# Patient Record
Sex: MALE | Race: WHITE | NOT HISPANIC OR LATINO | Employment: OTHER | URBAN - METROPOLITAN AREA
[De-identification: names, ages, dates, MRNs, and addresses within clinical notes are randomized per-mention and may not be internally consistent; named-entity substitution may affect disease eponyms.]

---

## 2018-04-27 ENCOUNTER — OFFICE VISIT (OUTPATIENT)
Dept: URGENT CARE | Facility: CLINIC | Age: 73
End: 2018-04-27
Payer: MEDICARE

## 2018-04-27 ENCOUNTER — APPOINTMENT (OUTPATIENT)
Dept: RADIOLOGY | Facility: IMAGING CENTER | Age: 73
End: 2018-04-27
Attending: NURSE PRACTITIONER
Payer: MEDICARE

## 2018-04-27 VITALS
WEIGHT: 173 LBS | DIASTOLIC BLOOD PRESSURE: 74 MMHG | BODY MASS INDEX: 27.8 KG/M2 | HEART RATE: 102 BPM | TEMPERATURE: 98.2 F | SYSTOLIC BLOOD PRESSURE: 128 MMHG | OXYGEN SATURATION: 98 % | RESPIRATION RATE: 12 BRPM | HEIGHT: 66 IN

## 2018-04-27 DIAGNOSIS — M25.512 LEFT SHOULDER PAIN, UNSPECIFIED CHRONICITY: ICD-10-CM

## 2018-04-27 DIAGNOSIS — M25.562 ACUTE PAIN OF LEFT KNEE: ICD-10-CM

## 2018-04-27 PROCEDURE — 99203 OFFICE O/P NEW LOW 30 MIN: CPT | Performed by: NURSE PRACTITIONER

## 2018-04-27 PROCEDURE — 73030 X-RAY EXAM OF SHOULDER: CPT | Mod: TC,FY,LT | Performed by: NURSE PRACTITIONER

## 2018-04-27 PROCEDURE — 73562 X-RAY EXAM OF KNEE 3: CPT | Mod: TC,FY,LT | Performed by: NURSE PRACTITIONER

## 2018-04-27 RX ORDER — PANTOPRAZOLE SODIUM 40 MG/1
40 TABLET, DELAYED RELEASE ORAL
Status: ON HOLD | COMMUNITY
End: 2019-11-24 | Stop reason: SDUPTHER

## 2018-04-27 RX ORDER — LIDOCAINE 50 MG/G
PATCH TOPICAL
Qty: 30 PATCH | Refills: 0 | Status: SHIPPED | OUTPATIENT
Start: 2018-04-27 | End: 2018-06-07

## 2018-04-27 RX ORDER — SODIUM BISULFITE 100 %
POWDER (GRAM) MISCELLANEOUS
COMMUNITY
End: 2018-06-07

## 2018-04-27 RX ORDER — HYDROXYZINE 50 MG/1
50 TABLET, FILM COATED ORAL EVERY 6 HOURS PRN
COMMUNITY
End: 2019-11-11

## 2018-04-27 RX ORDER — INSULIN GLARGINE 100 [IU]/ML
36 INJECTION, SOLUTION SUBCUTANEOUS EVERY EVENING
Status: ON HOLD | COMMUNITY
Start: 2018-02-26 | End: 2019-11-18

## 2018-04-27 ASSESSMENT — ENCOUNTER SYMPTOMS
FEVER: 0
CHILLS: 0

## 2018-04-27 ASSESSMENT — PAIN SCALES - GENERAL: PAINLEVEL: 5=MODERATE PAIN

## 2018-04-27 NOTE — PROGRESS NOTES
"Subjective:      Luis Sepulveda is a 72 y.o. male who presents with Knee Pain (started three weeks ago) and Shoulder Pain    Past Medical History:   Diagnosis Date   • CAD (coronary artery disease)     stents   • Diabetes    • Hypertension    • Peripheral neuropathy (CMS-HCC)      Social History     Social History   • Marital status:      Spouse name: N/A   • Number of children: N/A   • Years of education: N/A     Occupational History   • Not on file.     Social History Main Topics   • Smoking status: Former Smoker     Years: 55.00     Quit date: 1/23/2014   • Smokeless tobacco: Never Used   • Alcohol use No   • Drug use: No   • Sexual activity: Not on file     Other Topics Concern   • Not on file     Social History Narrative   • No narrative on file     History reviewed. No pertinent family history.    Allergies : Patient has no known allergies.    Patient is a 72-year-old male who presents to complaint of pain to the left knee and left shoulder. He states symptoms started about 3 weeks ago. Prior to that he does not recollect having problematic pain. He denies any known injury.          Knee Pain   This is a new problem. The current episode started 1 to 4 weeks ago. The problem occurs constantly. The problem has been unchanged. Pertinent negatives include no chills or fever. Associated symptoms comments: Shoulder pain. Nothing aggravates the symptoms. He has tried nothing for the symptoms. The treatment provided no relief.       Review of Systems   Constitutional: Negative for chills, fever and malaise/fatigue.   Musculoskeletal:        Knee and shoulder pain   Skin: Negative.    All other systems reviewed and are negative.         Objective:     /74   Pulse (!) 102   Temp 36.8 °C (98.2 °F)   Resp 12   Ht 1.676 m (5' 6\")   Wt 78.5 kg (173 lb)   SpO2 98%   BMI 27.92 kg/m²      Physical Exam   Constitutional: He is oriented to person, place, and time. He appears well-developed and " well-nourished.   Musculoskeletal:        Left shoulder: He exhibits decreased range of motion and pain.   Point tenderness to the anterior left shoulder. Full range of motion with abduction anteriorly and posteriorly. Unable to abduct greater than 90° laterally.  5/5 and equal, push/pull 5/5 and equal, this does not replicate pain.    There appears to be slight hypertrophy of the left knee medially, there is point tenderness to the medial posterior aspect of the knee and over the medial aspect of the knee over the joint line. Patient is able to extend to 170°. Pain is reproduced with weightbearing and extension. No obvious laxity in the knee joint.   Neurological: He is alert and oriented to person, place, and time.   Skin: Skin is warm and dry. Capillary refill takes less than 2 seconds.   Psychiatric: He has a normal mood and affect. His behavior is normal. Judgment and thought content normal.   Vitals reviewed.    XR knee:    /27/2018 4:26 PM    HISTORY/REASON FOR EXAM:  Atraumatic Pain/Swelling/Deformity  Left knee pain.    TECHNIQUE/EXAM DESCRIPTION AND NUMBER OF VIEWS:  3 views of the LEFT knee.    COMPARISON: None    FINDINGS:  No acute fracture or dislocation.    Moderate tricompartmental osteoarthritis, most in the medial compartment.    Moderate knee joint effusion.    Vascular calcification.   Impression         1. Moderate knee joint effusion. No acute fracture.         Xr shoulder:     4/27/2018 4:26 PM    HISTORY/REASON FOR EXAM:  Atraumatic Pain/Swelling/Deformity  Left shoulder pain x 3 weeks, no known injury    TECHNIQUE/EXAM DESCRIPTION AND NUMBER OF VIEWS:  3 views of the LEFT shoulder.    COMPARISON: None    FINDINGS:    No acute fracture or dislocation.  Moderate osteoarthritis of the glenohumeral joint and AC joint.  Redemonstration of subacromial enthesophyte.   Impression         Moderate osteoarthritis of the glenohumeral joint and AC joint.  Redemonstration of subacromial enthesophyte                  Assessment/Plan:     1. Acute pain of left knee  -ice PRN  -tylenol PRN  -rest  -follow up with PCP for persistent symptoms  -consider referral to orthopedics for worsening of symptoms  -Lidoderm patches PRN    2. Acute pain of the left shoulder     -ice PRN  -tylenol PRN  -rest  -follow up with PCP for persistent symptoms  -consider referral to orthopedics for worsening of symptoms  -Lidoderm patches PRN

## 2018-06-07 ENCOUNTER — HOSPITAL ENCOUNTER (EMERGENCY)
Facility: MEDICAL CENTER | Age: 73
End: 2018-06-07
Attending: EMERGENCY MEDICINE
Payer: MEDICARE

## 2018-06-07 VITALS
TEMPERATURE: 98.8 F | HEIGHT: 66 IN | HEART RATE: 90 BPM | BODY MASS INDEX: 27.35 KG/M2 | WEIGHT: 170.19 LBS | RESPIRATION RATE: 18 BRPM | OXYGEN SATURATION: 95 % | DIASTOLIC BLOOD PRESSURE: 78 MMHG | SYSTOLIC BLOOD PRESSURE: 144 MMHG

## 2018-06-07 DIAGNOSIS — M19.90 INFLAMMATORY ARTHRITIS: ICD-10-CM

## 2018-06-07 LAB
ALBUMIN SERPL BCP-MCNC: 3.8 G/DL (ref 3.2–4.9)
ALBUMIN/GLOB SERPL: 1.1 G/DL
ALP SERPL-CCNC: 120 U/L (ref 30–99)
ALT SERPL-CCNC: 14 U/L (ref 2–50)
ANION GAP SERPL CALC-SCNC: 9 MMOL/L (ref 0–11.9)
AST SERPL-CCNC: 12 U/L (ref 12–45)
BASOPHILS # BLD AUTO: 0.5 % (ref 0–1.8)
BASOPHILS # BLD: 0.04 K/UL (ref 0–0.12)
BILIRUB SERPL-MCNC: 0.6 MG/DL (ref 0.1–1.5)
BNP SERPL-MCNC: 84 PG/ML (ref 0–100)
BUN SERPL-MCNC: 68 MG/DL (ref 8–22)
CALCIUM SERPL-MCNC: 8.7 MG/DL (ref 8.4–10.2)
CHLORIDE SERPL-SCNC: 107 MMOL/L (ref 96–112)
CK SERPL-CCNC: 32 U/L (ref 0–154)
CO2 SERPL-SCNC: 20 MMOL/L (ref 20–33)
CREAT SERPL-MCNC: 3.9 MG/DL (ref 0.5–1.4)
CRP SERPL HS-MCNC: 3.37 MG/DL (ref 0–0.75)
EOSINOPHIL # BLD AUTO: 0.01 K/UL (ref 0–0.51)
EOSINOPHIL NFR BLD: 0.1 % (ref 0–6.9)
ERYTHROCYTE [DISTWIDTH] IN BLOOD BY AUTOMATED COUNT: 43.3 FL (ref 35.9–50)
ERYTHROCYTE [SEDIMENTATION RATE] IN BLOOD BY WESTERGREN METHOD: 62 MM/HOUR (ref 0–20)
GLOBULIN SER CALC-MCNC: 3.5 G/DL (ref 1.9–3.5)
GLUCOSE SERPL-MCNC: 120 MG/DL (ref 65–99)
HCT VFR BLD AUTO: 26.9 % (ref 42–52)
HGB BLD-MCNC: 9.5 G/DL (ref 14–18)
IMM GRANULOCYTES # BLD AUTO: 0.05 K/UL (ref 0–0.11)
IMM GRANULOCYTES NFR BLD AUTO: 0.6 % (ref 0–0.9)
LYMPHOCYTES # BLD AUTO: 0.88 K/UL (ref 1–4.8)
LYMPHOCYTES NFR BLD: 10.4 % (ref 22–41)
MCH RBC QN AUTO: 29.1 PG (ref 27–33)
MCHC RBC AUTO-ENTMCNC: 35.3 G/DL (ref 33.7–35.3)
MCV RBC AUTO: 82.5 FL (ref 81.4–97.8)
MONOCYTES # BLD AUTO: 0.79 K/UL (ref 0–0.85)
MONOCYTES NFR BLD AUTO: 9.4 % (ref 0–13.4)
NEUTROPHILS # BLD AUTO: 6.66 K/UL (ref 1.82–7.42)
NEUTROPHILS NFR BLD: 79 % (ref 44–72)
NRBC # BLD AUTO: 0 K/UL
NRBC BLD-RTO: 0 /100 WBC
PLATELET # BLD AUTO: 267 K/UL (ref 164–446)
PMV BLD AUTO: 8.6 FL (ref 9–12.9)
POTASSIUM SERPL-SCNC: 3.5 MMOL/L (ref 3.6–5.5)
PROT SERPL-MCNC: 7.3 G/DL (ref 6–8.2)
RBC # BLD AUTO: 3.26 M/UL (ref 4.7–6.1)
SODIUM SERPL-SCNC: 136 MMOL/L (ref 135–145)
TROPONIN I SERPL-MCNC: <0.02 NG/ML (ref 0–0.04)
WBC # BLD AUTO: 8.4 K/UL (ref 4.8–10.8)

## 2018-06-07 PROCEDURE — 84484 ASSAY OF TROPONIN QUANT: CPT

## 2018-06-07 PROCEDURE — 99284 EMERGENCY DEPT VISIT MOD MDM: CPT

## 2018-06-07 PROCEDURE — 36415 COLL VENOUS BLD VENIPUNCTURE: CPT

## 2018-06-07 PROCEDURE — 80053 COMPREHEN METABOLIC PANEL: CPT

## 2018-06-07 PROCEDURE — 86140 C-REACTIVE PROTEIN: CPT

## 2018-06-07 PROCEDURE — 82550 ASSAY OF CK (CPK): CPT

## 2018-06-07 PROCEDURE — 85025 COMPLETE CBC W/AUTO DIFF WBC: CPT

## 2018-06-07 PROCEDURE — 85652 RBC SED RATE AUTOMATED: CPT

## 2018-06-07 PROCEDURE — 83880 ASSAY OF NATRIURETIC PEPTIDE: CPT

## 2018-06-07 RX ORDER — SODIUM BICARBONATE 650 MG/1
650 TABLET ORAL 2 TIMES DAILY
Status: ON HOLD | COMMUNITY
End: 2019-11-18

## 2018-06-07 RX ORDER — HYDROCODONE BITARTRATE AND ACETAMINOPHEN 5; 325 MG/1; MG/1
1 TABLET ORAL EVERY 6 HOURS PRN
Qty: 16 TAB | Refills: 0 | Status: SHIPPED | OUTPATIENT
Start: 2018-06-07 | End: 2018-06-12

## 2018-06-07 RX ORDER — CARVEDILOL 6.25 MG/1
6.25 TABLET ORAL 2 TIMES DAILY WITH MEALS
Status: ON HOLD | COMMUNITY
End: 2019-11-18

## 2018-06-07 ASSESSMENT — PAIN SCALES - GENERAL
PAINLEVEL_OUTOF10: 3
PAINLEVEL_OUTOF10: 5

## 2018-06-07 NOTE — ED PROVIDER NOTES
ED Provider Note      ER PROVIDER NOTE        CHIEF COMPLAINT  Chief Complaint   Patient presents with   • Weakness     for the last 4-5 days limiting ROM   • Body Aches     muscle and body aches for the past 2 weeks, pt went to  2 weeks ago, he was told that Artritis might be the cause, pt came to the ED since he is not feeling better.        HPI  Luis Sepulveda is a 72 y.o. male who presents to the emergency department complaining of joint aches.  Patient reports that over the last 2 weeks he has had intermittent joint aches.  Initially his left knee, went to urgent care and was told he had arthritis.  Since that point he has had multiple other joints, both wrists, both knees, shoulders that have been aching as well.  States does not feel like muscle pain more of joint pain.  Seems to come and go without real cause.  He has had no fevers or chills.  He has had no chest pain, shortness of breath, nausea vomiting or diarrhea.  No recent travel.  No rashes.  Does report some generalized weakness but no focal weakness numbness or tingling    REVIEW OF SYSTEMS  Pertinent positives include joint aches. Pertinent negatives include no fever. See HPI for details. All other systems reviewed and are negative.    PAST MEDICAL HISTORY   has a past medical history of CAD (coronary artery disease); Diabetes; Hypertension; and Peripheral neuropathy (HCC).    SURGICAL HISTORY   has a past surgical history that includes other cardiac surgery; appendectomy; and other orthopedic surgery.    FAMILY HISTORY  History reviewed. No pertinent family history.    SOCIAL HISTORY  Social History     Social History   • Marital status:      Spouse name: N/A   • Number of children: N/A   • Years of education: N/A     Social History Main Topics   • Smoking status: Former Smoker     Years: 55.00     Quit date: 1/23/2014   • Smokeless tobacco: Never Used   • Alcohol use No   • Drug use: No   • Sexual activity: Not on file     Other  "Topics Concern   • Not on file     Social History Narrative   • No narrative on file      History   Drug Use No       CURRENT MEDICATIONS  Home Medications     Reviewed by Sandra Mathew (Pharmacy Tech) on 06/07/18 at 1507  Med List Status: Complete   Medication Last Dose Status   aspirin (ASA) 81 MG CHEW 6/6/2018 Active   carvedilol (COREG) 6.25 MG Tab 6/7/2018 Active   furosemide (LASIX) 40 MG TABS 6/7/2018 Active   hydrOXYzine HCl (ATARAX) 50 MG Tab 2 DAYS Active   LANTUS SOLOSTAR 100 UNIT/ML Solution Pen-injector injection 6/6/2018 Active   linagliptin (TRADJENTA) 5 MG Tab tablet 6/7/2018 Active   pantoprazole (PROTONIX) 40 MG Tablet Delayed Response 6/6/2018 Active   prasugrel (EFFIENT) 10 MG TABS 6/6/2018 Active   sodium bicarbonate (SODIUM BICARBONATE) 650 MG Tab 6/6/2018 Active                ALLERGIES  No Known Allergies    PHYSICAL EXAM  VITAL SIGNS: /83   Pulse 95   Temp 37.1 °C (98.8 °F)   Resp 18   Ht 1.676 m (5' 6\")   Wt 77.2 kg (170 lb 3.1 oz)   SpO2 99%   BMI 27.47 kg/m²   Pulse ox interpretation: I interpret this pulse ox as normal.    Constitutional: Alert in no apparent distress.  HENT: No signs of trauma, Bilateral external ears normal, Nose normal.   Eyes: Pupils are equal and reactive, Conjunctiva normal, Non-icteric.   Neck: Normal range of motion, No tenderness, Supple, No stridor.   Lymphatic: No lymphadenopathy noted.   Cardiovascular: Regular rate and rhythm, no murmurs.   Thorax & Lungs: Normal breath sounds, No respiratory distress, No wheezing, No chest tenderness.   Abdomen: Bowel sounds normal, Soft, No tenderness, No masses, No pulsatile masses. No peritoneal signs.  Skin: Warm, Dry, No erythema, No rash.   Back: No bony tenderness, No CVA tenderness.   Extremities: No muscular tenderness, mild tenderness over bilateral knees, bilateral wrists and bilateral shoulders, no redness or swelling intact distal pulses, No edema, No other tenderness, No cyanosis, " Negative Tatum's sign.  Musculoskeletal: Good range of motion in all major joints. No other tenderness to palpation or major deformities noted.   Neurologic: Alert , strength is 5 out of 5 with bilateral upper extremities, bicep, tricep, , hip flexion and extension, knee flexion and extension normal motor function, Normal sensory function, No focal deficits noted.   Psychiatric: Affect normal, Judgment normal, Mood normal.     DIAGNOSTIC STUDIES / PROCEDURES      LABS  Results for orders placed or performed during the hospital encounter of 06/07/18   CBC with Differential   Result Value Ref Range    WBC 8.4 4.8 - 10.8 K/uL    RBC 3.26 (L) 4.70 - 6.10 M/uL    Hemoglobin 9.5 (L) 14.0 - 18.0 g/dL    Hematocrit 26.9 (L) 42.0 - 52.0 %    MCV 82.5 81.4 - 97.8 fL    MCH 29.1 27.0 - 33.0 pg    MCHC 35.3 33.7 - 35.3 g/dL    RDW 43.3 35.9 - 50.0 fL    Platelet Count 267 164 - 446 K/uL    MPV 8.6 (L) 9.0 - 12.9 fL    Neutrophils-Polys 79.00 (H) 44.00 - 72.00 %    Lymphocytes 10.40 (L) 22.00 - 41.00 %    Monocytes 9.40 0.00 - 13.40 %    Eosinophils 0.10 0.00 - 6.90 %    Basophils 0.50 0.00 - 1.80 %    Immature Granulocytes 0.60 0.00 - 0.90 %    Nucleated RBC 0.00 /100 WBC    Neutrophils (Absolute) 6.66 1.82 - 7.42 K/uL    Lymphs (Absolute) 0.88 (L) 1.00 - 4.80 K/uL    Monos (Absolute) 0.79 0.00 - 0.85 K/uL    Eos (Absolute) 0.01 0.00 - 0.51 K/uL    Baso (Absolute) 0.04 0.00 - 0.12 K/uL    Immature Granulocytes (abs) 0.05 0.00 - 0.11 K/uL    NRBC (Absolute) 0.00 K/uL   Complete Metabolic Panel (CMP)   Result Value Ref Range    Sodium 136 135 - 145 mmol/L    Potassium 3.5 (L) 3.6 - 5.5 mmol/L    Chloride 107 96 - 112 mmol/L    Co2 20 20 - 33 mmol/L    Anion Gap 9.0 0.0 - 11.9    Glucose 120 (H) 65 - 99 mg/dL    Bun 68 (H) 8 - 22 mg/dL    Creatinine 3.90 (H) 0.50 - 1.40 mg/dL    Calcium 8.7 8.4 - 10.2 mg/dL    AST(SGOT) 12 12 - 45 U/L    ALT(SGPT) 14 2 - 50 U/L    Alkaline Phosphatase 120 (H) 30 - 99 U/L    Total Bilirubin 0.6  0.1 - 1.5 mg/dL    Albumin 3.8 3.2 - 4.9 g/dL    Total Protein 7.3 6.0 - 8.2 g/dL    Globulin 3.5 1.9 - 3.5 g/dL    A-G Ratio 1.1 g/dL   Troponin   Result Value Ref Range    Troponin I <0.02 0.00 - 0.04 ng/mL   Btype Natriuretic Peptide   Result Value Ref Range    B Natriuretic Peptide 84 0 - 100 pg/mL   CREATINE KINASE   Result Value Ref Range    CPK Total 32 0 - 154 U/L   CRP QUANTITIVE (NON-CARDIAC)   Result Value Ref Range    Stat C-Reactive Protein 3.37 (H) 0.00 - 0.75 mg/dL   ESTIMATED GFR   Result Value Ref Range    GFR If  18 (A) >60 mL/min/1.73 m 2    GFR If Non African American 15 (A) >60 mL/min/1.73 m 2   WESTERGREN SED RATE   Result Value Ref Range    Sed Rate Westergren 62 (H) 0 - 20 mm/hour       All labs reviewed by me.    RADIOLOGY  No orders to display     The radiologist's interpretation of all radiological studies have been reviewed by me.    COURSE & MEDICAL DECISION MAKING  Nursing notes, VS, PMSFHx reviewed in chart.    2:45 PM Patient seen and examined at bedside. . Ordered for labs to evaluate his symptoms.     Review of patient records show chronic kidney disease and chronic anemia    5:04 PM  Patient reevaluated, updated on results, patient is comfortable with plan    Decision Making:  This is a 72 y.o. male presenting with pain in his joints.  The nature of his symptoms is suggestive of an inflammatory arthritis.  He has no obvious muscle weakness or real muscle pain suggestive of a myositis at this time.  And no focal findings on exam to suggest a septic arthritis is overall well-appearing, afebrile with appropriate vital signs.  He does have chronic kidney disease and numbers appear around baseline for him.  Given this nonsteroidals would not be a good choice in this patient, will provide Norco for pain, call  to arrange primary care follow-up for further care    I reviewed prescription monitoring program for patient's narcotic use before prescribing a scheduled  drug.The patient will not drink alcohol nor drive with prescribed medications. The patient will return for new or worsening symptoms and is stable at the time of discharge.    The patient is referred to a primary physician for blood pressure management, diabetic screening, and for all other preventative health concerns.    In prescribing controlled substances to this patient, I certify that I have obtained and reviewed the medical history of Luis Sepulveda. I have also made a good nate effort to obtain applicable records from other providers who have treated the patient and records did not demonstrate any increased risk of substance abuse that would prevent me from prescribing controlled substances.     I have conducted a physical exam and documented it. I have reviewed Mr. Sepulveda’s prescription history as maintained by the [place] Prescription Monitoring Program.     I have assessed the patient’s risk for abuse, dependency, and addiction using the validated Opioid Risk Tool available at https://www.mdcalc.com/scmhxo-zaqk-rxvd-ort-narcotic-abuse.     Given the above, I believe the benefits of controlled substance therapy outweigh the risks. The reasons for prescribing controlled substances include non-narcotic, oral analgesic alternatives are contraindicated. Accordingly, I have discussed the risk and benefits, treatment plan, and alternative therapies with the patient.         DISPOSITION:  Patient will be discharged home in stable condition.    FOLLOW UP:        Our  will call you to help arrange primary care follow-up in the next week      OUTPATIENT MEDICATIONS:  New Prescriptions    HYDROCODONE-ACETAMINOPHEN (NORCO) 5-325 MG TAB PER TABLET    Take 1 Tab by mouth every 6 hours as needed (pain) for up to 5 days.         FINAL IMPRESSION  1. Inflammatory arthritis         The note accurately reflects work and decisions made by me.  Macho Tompkins  6/7/2018  5:05 PM

## 2018-06-07 NOTE — ED NOTES
".  Chief Complaint   Patient presents with   • Weakness     for the last 4-5 days limiting ROM   • Body Aches     muscle and body aches for the past 2 weeks, pt went to  2 weeks ago, he was told that Artritis might be the cause, pt came to the ED since he is not feeling better.      .Blood pressure 154/83, pulse 95, temperature 37.1 °C (98.8 °F), resp. rate 18, height 1.676 m (5' 6\"), weight 77.2 kg (170 lb 3.1 oz), SpO2 99 %.      "

## 2018-06-08 ENCOUNTER — PATIENT OUTREACH (OUTPATIENT)
Dept: HEALTH INFORMATION MANAGEMENT | Facility: OTHER | Age: 73
End: 2018-06-08

## 2018-06-08 NOTE — DISCHARGE INSTRUCTIONS
Arthritis  Introduction  Arthritis is a term that is commonly used to refer to joint pain or joint disease. There are more than 100 types of arthritis.  What are the causes?  The most common cause of this condition is wear and tear of a joint. Other causes include:  · Gout.  · Inflammation of a joint.  · An infection of a joint.  · Sprains and other injuries near the joint.  · A drug reaction or allergic reaction.  In some cases, the cause may not be known.  What are the signs or symptoms?  The main symptom of this condition is pain in the joint with movement. Other symptoms include:  · Redness, swelling, or stiffness at a joint.  · Warmth coming from the joint.  · Fever.  · Overall feeling of illness.  How is this diagnosed?  This condition may be diagnosed with a physical exam and tests, including:  · Blood tests.  · Urine tests.  · Imaging tests, such as MRI, X-rays, or a CT scan.  Sometimes, fluid is removed from a joint for testing.  How is this treated?  Treatment for this condition may involve:  · Treatment of the cause, if it is known.  · Rest.  · Raising (elevating) the joint.  · Applying cold or hot packs to the joint.  · Medicines to improve symptoms and reduce inflammation.  · Injections of a steroid such as cortisone into the joint to help reduce pain and inflammation.  Depending on the cause of your arthritis, you may need to make lifestyle changes to reduce stress on your joint. These changes may include exercising more and losing weight.  Follow these instructions at home:  Medicines  · Take over-the-counter and prescription medicines only as told by your health care provider.  · Do not take aspirin to relieve pain if gout is suspected.  Activity  · Rest your joint if told by your health care provider. Rest is important when your disease is active and your joint feels painful, swollen, or stiff.  · Avoid activities that make the pain worse. It is important to balance activity with rest.  · Exercise  your joint regularly with range-of-motion exercises as told by your health care provider. Try doing low-impact exercise, such as:  ¨ Swimming.  ¨ Water aerobics.  ¨ Biking.  ¨ Walking.  Joint Care   · If your joint is swollen, keep it elevated if told by your health care provider.  · If your joint feels stiff in the morning, try taking a warm shower.  · If directed, apply heat to the joint. If you have diabetes, do not apply heat without permission from your health care provider.  ¨ Put a towel between the joint and the hot pack or heating pad.  ¨ Leave the heat on the area for 20-30 minutes.  · If directed, apply ice to the joint:  ¨ Put ice in a plastic bag.  ¨ Place a towel between your skin and the bag.  ¨ Leave the ice on for 20 minutes, 2-3 times per day.  · Keep all follow-up visits as told by your health care provider. This is important.  Contact a health care provider if:  · The pain gets worse.  · You have a fever.  Get help right away if:  · You develop severe joint pain, swelling, or redness.  · Many joints become painful and swollen.  · You develop severe back pain.  · You develop severe weakness in your leg.  · You cannot control your bladder or bowels.  This information is not intended to replace advice given to you by your health care provider. Make sure you discuss any questions you have with your health care provider.  Document Released: 01/25/2006 Document Revised: 05/25/2017 Document Reviewed: 03/14/2016  © 2017 [employer]vier

## 2018-06-08 NOTE — ED NOTES
D/c pt home, rx given . Pt aware of f/u instructions , aware to return for any changes or concerns. No further questions upon d/c home from ed  Pt given and understands controlled substance use consent form signed  Pt aware to no drive or operate vehicle after receiving or taking medication

## 2018-06-25 ENCOUNTER — HOSPITAL ENCOUNTER (OUTPATIENT)
Facility: MEDICAL CENTER | Age: 73
End: 2018-06-26
Attending: EMERGENCY MEDICINE | Admitting: INTERNAL MEDICINE
Payer: MEDICARE

## 2018-06-25 ENCOUNTER — APPOINTMENT (OUTPATIENT)
Dept: RADIOLOGY | Facility: MEDICAL CENTER | Age: 73
End: 2018-06-25
Attending: INTERNAL MEDICINE
Payer: MEDICARE

## 2018-06-25 ENCOUNTER — APPOINTMENT (OUTPATIENT)
Dept: RADIOLOGY | Facility: MEDICAL CENTER | Age: 73
End: 2018-06-25
Attending: EMERGENCY MEDICINE
Payer: MEDICARE

## 2018-06-25 DIAGNOSIS — M13.0 POLYARTICULAR ARTHRITIS: ICD-10-CM

## 2018-06-25 DIAGNOSIS — E05.90 HYPERTHYROIDISM: ICD-10-CM

## 2018-06-25 DIAGNOSIS — N18.9 CHRONIC RENAL FAILURE, UNSPECIFIED CKD STAGE: ICD-10-CM

## 2018-06-25 PROBLEM — N17.9 ACUTE RENAL FAILURE SUPERIMPOSED ON STAGE 4 CHRONIC KIDNEY DISEASE (HCC): Status: ACTIVE | Noted: 2018-06-25

## 2018-06-25 PROBLEM — R79.89 LOW TSH LEVEL: Status: ACTIVE | Noted: 2018-06-25

## 2018-06-25 PROBLEM — E11.9 TYPE 2 DIABETES MELLITUS (HCC): Status: ACTIVE | Noted: 2018-06-25

## 2018-06-25 PROBLEM — N18.4 ACUTE RENAL FAILURE SUPERIMPOSED ON STAGE 4 CHRONIC KIDNEY DISEASE (HCC): Status: ACTIVE | Noted: 2018-06-25

## 2018-06-25 PROBLEM — I10 HTN (HYPERTENSION): Status: ACTIVE | Noted: 2018-06-25

## 2018-06-25 PROBLEM — E87.1 HYPONATREMIA: Status: ACTIVE | Noted: 2018-06-25

## 2018-06-25 PROBLEM — I25.10 CAD (CORONARY ARTERY DISEASE): Status: ACTIVE | Noted: 2018-06-25

## 2018-06-25 LAB
ALBUMIN SERPL BCP-MCNC: 3.5 G/DL (ref 3.2–4.9)
ALBUMIN/GLOB SERPL: 0.9 G/DL
ALP SERPL-CCNC: 114 U/L (ref 30–99)
ALT SERPL-CCNC: 17 U/L (ref 2–50)
ANION GAP SERPL CALC-SCNC: 7 MMOL/L (ref 0–11.9)
AST SERPL-CCNC: 15 U/L (ref 12–45)
BASOPHILS # BLD AUTO: 0.4 % (ref 0–1.8)
BASOPHILS # BLD: 0.04 K/UL (ref 0–0.12)
BILIRUB SERPL-MCNC: 0.6 MG/DL (ref 0.1–1.5)
BUN SERPL-MCNC: 52 MG/DL (ref 8–22)
C3 SERPL-MCNC: 143 MG/DL (ref 87–200)
C4 SERPL-MCNC: 23 MG/DL (ref 19–52)
CALCIUM SERPL-MCNC: 9 MG/DL (ref 8.4–10.2)
CHLORIDE SERPL-SCNC: 101 MMOL/L (ref 96–112)
CO2 SERPL-SCNC: 23 MMOL/L (ref 20–33)
CREAT SERPL-MCNC: 4.43 MG/DL (ref 0.5–1.4)
CRP SERPL HS-MCNC: 7.46 MG/DL (ref 0–0.75)
EOSINOPHIL # BLD AUTO: 0 K/UL (ref 0–0.51)
EOSINOPHIL NFR BLD: 0 % (ref 0–6.9)
ERYTHROCYTE [DISTWIDTH] IN BLOOD BY AUTOMATED COUNT: 45.2 FL (ref 35.9–50)
ERYTHROCYTE [SEDIMENTATION RATE] IN BLOOD BY WESTERGREN METHOD: 62 MM/HOUR (ref 0–20)
EST. AVERAGE GLUCOSE BLD GHB EST-MCNC: 146 MG/DL
GLOBULIN SER CALC-MCNC: 3.8 G/DL (ref 1.9–3.5)
GLUCOSE BLD-MCNC: 254 MG/DL (ref 65–99)
GLUCOSE SERPL-MCNC: 192 MG/DL (ref 65–99)
HBA1C MFR BLD: 6.7 % (ref 0–5.6)
HCT VFR BLD AUTO: 29.9 % (ref 42–52)
HGB BLD-MCNC: 10 G/DL (ref 14–18)
IMM GRANULOCYTES # BLD AUTO: 0.13 K/UL (ref 0–0.11)
IMM GRANULOCYTES NFR BLD AUTO: 1.2 % (ref 0–0.9)
LYMPHOCYTES # BLD AUTO: 0.86 K/UL (ref 1–4.8)
LYMPHOCYTES NFR BLD: 8.1 % (ref 22–41)
MCH RBC QN AUTO: 28.7 PG (ref 27–33)
MCHC RBC AUTO-ENTMCNC: 33.4 G/DL (ref 33.7–35.3)
MCV RBC AUTO: 85.9 FL (ref 81.4–97.8)
MONOCYTES # BLD AUTO: 0.89 K/UL (ref 0–0.85)
MONOCYTES NFR BLD AUTO: 8.4 % (ref 0–13.4)
NEUTROPHILS # BLD AUTO: 8.68 K/UL (ref 1.82–7.42)
NEUTROPHILS NFR BLD: 81.9 % (ref 44–72)
NRBC # BLD AUTO: 0 K/UL
NRBC BLD-RTO: 0 /100 WBC
PLATELET # BLD AUTO: 274 K/UL (ref 164–446)
PMV BLD AUTO: 8.2 FL (ref 9–12.9)
POTASSIUM SERPL-SCNC: 4.8 MMOL/L (ref 3.6–5.5)
PROT SERPL-MCNC: 7.3 G/DL (ref 6–8.2)
RBC # BLD AUTO: 3.48 M/UL (ref 4.7–6.1)
RHEUMATOID FACT SER IA-ACNC: <10 IU/ML (ref 0–14)
SODIUM SERPL-SCNC: 131 MMOL/L (ref 135–145)
T3FREE SERPL-MCNC: 3.54 PG/ML (ref 2.4–4.2)
T4 FREE SERPL-MCNC: 1.37 NG/DL (ref 0.58–1.64)
T4 SERPL-MCNC: 9.7 UG/DL (ref 4–12)
TSH SERPL DL<=0.005 MIU/L-ACNC: 0.01 UIU/ML (ref 0.38–5.33)
WBC # BLD AUTO: 10.6 K/UL (ref 4.8–10.8)

## 2018-06-25 PROCEDURE — 86038 ANTINUCLEAR ANTIBODIES: CPT

## 2018-06-25 PROCEDURE — 96374 THER/PROPH/DIAG INJ IV PUSH: CPT

## 2018-06-25 PROCEDURE — 71045 X-RAY EXAM CHEST 1 VIEW: CPT

## 2018-06-25 PROCEDURE — 86225 DNA ANTIBODY NATIVE: CPT

## 2018-06-25 PROCEDURE — 83036 HEMOGLOBIN GLYCOSYLATED A1C: CPT

## 2018-06-25 PROCEDURE — 80053 COMPREHEN METABOLIC PANEL: CPT

## 2018-06-25 PROCEDURE — 84436 ASSAY OF TOTAL THYROXINE: CPT

## 2018-06-25 PROCEDURE — 85652 RBC SED RATE AUTOMATED: CPT

## 2018-06-25 PROCEDURE — 86235 NUCLEAR ANTIGEN ANTIBODY: CPT | Mod: 91

## 2018-06-25 PROCEDURE — 73120 X-RAY EXAM OF HAND: CPT | Mod: LT

## 2018-06-25 PROCEDURE — 84443 ASSAY THYROID STIM HORMONE: CPT

## 2018-06-25 PROCEDURE — 700102 HCHG RX REV CODE 250 W/ 637 OVERRIDE(OP)

## 2018-06-25 PROCEDURE — 99220 PR INITIAL OBSERVATION CARE,LEVL III: CPT | Performed by: INTERNAL MEDICINE

## 2018-06-25 PROCEDURE — 83516 IMMUNOASSAY NONANTIBODY: CPT

## 2018-06-25 PROCEDURE — 99285 EMERGENCY DEPT VISIT HI MDM: CPT

## 2018-06-25 PROCEDURE — 700105 HCHG RX REV CODE 258: Performed by: EMERGENCY MEDICINE

## 2018-06-25 PROCEDURE — 94760 N-INVAS EAR/PLS OXIMETRY 1: CPT

## 2018-06-25 PROCEDURE — 87799 DETECT AGENT NOS DNA QUANT: CPT

## 2018-06-25 PROCEDURE — G0378 HOSPITAL OBSERVATION PER HR: HCPCS

## 2018-06-25 PROCEDURE — A9270 NON-COVERED ITEM OR SERVICE: HCPCS | Performed by: INTERNAL MEDICINE

## 2018-06-25 PROCEDURE — 36415 COLL VENOUS BLD VENIPUNCTURE: CPT

## 2018-06-25 PROCEDURE — 80074 ACUTE HEPATITIS PANEL: CPT

## 2018-06-25 PROCEDURE — 86431 RHEUMATOID FACTOR QUANT: CPT

## 2018-06-25 PROCEDURE — 86200 CCP ANTIBODY: CPT

## 2018-06-25 PROCEDURE — 700105 HCHG RX REV CODE 258: Performed by: INTERNAL MEDICINE

## 2018-06-25 PROCEDURE — 85025 COMPLETE CBC W/AUTO DIFF WBC: CPT

## 2018-06-25 PROCEDURE — 86162 COMPLEMENT TOTAL (CH50): CPT

## 2018-06-25 PROCEDURE — 86140 C-REACTIVE PROTEIN: CPT

## 2018-06-25 PROCEDURE — 700111 HCHG RX REV CODE 636 W/ 250 OVERRIDE (IP): Performed by: INTERNAL MEDICINE

## 2018-06-25 PROCEDURE — 84439 ASSAY OF FREE THYROXINE: CPT

## 2018-06-25 PROCEDURE — 73030 X-RAY EXAM OF SHOULDER: CPT | Mod: LT

## 2018-06-25 PROCEDURE — 700102 HCHG RX REV CODE 250 W/ 637 OVERRIDE(OP): Performed by: INTERNAL MEDICINE

## 2018-06-25 PROCEDURE — 73120 X-RAY EXAM OF HAND: CPT | Mod: RT

## 2018-06-25 PROCEDURE — 82962 GLUCOSE BLOOD TEST: CPT

## 2018-06-25 PROCEDURE — 82784 ASSAY IGA/IGD/IGG/IGM EACH: CPT

## 2018-06-25 PROCEDURE — 84481 FREE ASSAY (FT-3): CPT

## 2018-06-25 PROCEDURE — 73030 X-RAY EXAM OF SHOULDER: CPT | Mod: RT

## 2018-06-25 PROCEDURE — 700111 HCHG RX REV CODE 636 W/ 250 OVERRIDE (IP): Performed by: EMERGENCY MEDICINE

## 2018-06-25 PROCEDURE — 73562 X-RAY EXAM OF KNEE 3: CPT | Mod: LT

## 2018-06-25 PROCEDURE — 86160 COMPLEMENT ANTIGEN: CPT

## 2018-06-25 RX ORDER — BISACODYL 10 MG
10 SUPPOSITORY, RECTAL RECTAL
Status: DISCONTINUED | OUTPATIENT
Start: 2018-06-25 | End: 2018-06-26 | Stop reason: HOSPADM

## 2018-06-25 RX ORDER — PREDNISONE 20 MG/1
40 TABLET ORAL DAILY
Status: DISCONTINUED | OUTPATIENT
Start: 2018-06-25 | End: 2018-06-26 | Stop reason: HOSPADM

## 2018-06-25 RX ORDER — DIPHENHYDRAMINE HCL 25 MG
50 TABLET ORAL NIGHTLY PRN
Status: ON HOLD | COMMUNITY
End: 2019-11-18

## 2018-06-25 RX ORDER — HYDROCODONE BITARTRATE AND ACETAMINOPHEN 5; 325 MG/1; MG/1
1-2 TABLET ORAL EVERY 6 HOURS PRN
Status: DISCONTINUED | OUTPATIENT
Start: 2018-06-25 | End: 2018-06-26 | Stop reason: HOSPADM

## 2018-06-25 RX ORDER — MORPHINE SULFATE 4 MG/ML
4 INJECTION, SOLUTION INTRAMUSCULAR; INTRAVENOUS
Status: DISCONTINUED | OUTPATIENT
Start: 2018-06-25 | End: 2018-06-26 | Stop reason: HOSPADM

## 2018-06-25 RX ORDER — CARVEDILOL 6.25 MG/1
6.25 TABLET ORAL 2 TIMES DAILY WITH MEALS
Status: DISCONTINUED | OUTPATIENT
Start: 2018-06-25 | End: 2018-06-26 | Stop reason: HOSPADM

## 2018-06-25 RX ORDER — ACETAMINOPHEN 325 MG/1
650 TABLET ORAL EVERY 6 HOURS PRN
Status: DISCONTINUED | OUTPATIENT
Start: 2018-06-25 | End: 2018-06-26 | Stop reason: HOSPADM

## 2018-06-25 RX ORDER — DIPHENHYDRAMINE HCL 25 MG
50 TABLET ORAL
Status: DISCONTINUED | OUTPATIENT
Start: 2018-06-25 | End: 2018-06-26 | Stop reason: HOSPADM

## 2018-06-25 RX ORDER — OXYCODONE HYDROCHLORIDE 5 MG/1
5 TABLET ORAL
Status: DISCONTINUED | OUTPATIENT
Start: 2018-06-25 | End: 2018-06-26 | Stop reason: HOSPADM

## 2018-06-25 RX ORDER — HEPARIN SODIUM 5000 [USP'U]/ML
5000 INJECTION, SOLUTION INTRAVENOUS; SUBCUTANEOUS EVERY 8 HOURS
Status: DISCONTINUED | OUTPATIENT
Start: 2018-06-25 | End: 2018-06-26 | Stop reason: HOSPADM

## 2018-06-25 RX ORDER — PRASUGREL 10 MG/1
10 TABLET, FILM COATED ORAL DAILY
Status: DISCONTINUED | OUTPATIENT
Start: 2018-06-25 | End: 2018-06-26 | Stop reason: HOSPADM

## 2018-06-25 RX ORDER — POLYETHYLENE GLYCOL 3350 17 G/17G
1 POWDER, FOR SOLUTION ORAL
Status: DISCONTINUED | OUTPATIENT
Start: 2018-06-25 | End: 2018-06-26 | Stop reason: HOSPADM

## 2018-06-25 RX ORDER — PANTOPRAZOLE SODIUM 40 MG/1
40 TABLET, DELAYED RELEASE ORAL DAILY
Status: DISCONTINUED | OUTPATIENT
Start: 2018-06-25 | End: 2018-06-25

## 2018-06-25 RX ORDER — ONDANSETRON 4 MG/1
4 TABLET, ORALLY DISINTEGRATING ORAL EVERY 4 HOURS PRN
Status: DISCONTINUED | OUTPATIENT
Start: 2018-06-25 | End: 2018-06-26 | Stop reason: HOSPADM

## 2018-06-25 RX ORDER — HYDROCODONE BITARTRATE AND ACETAMINOPHEN 5; 325 MG/1; MG/1
1-2 TABLET ORAL EVERY 6 HOURS PRN
COMMUNITY
End: 2019-11-11

## 2018-06-25 RX ORDER — SODIUM CHLORIDE 9 MG/ML
500 INJECTION, SOLUTION INTRAVENOUS ONCE
Status: COMPLETED | OUTPATIENT
Start: 2018-06-25 | End: 2018-06-25

## 2018-06-25 RX ORDER — AMOXICILLIN 250 MG
2 CAPSULE ORAL 2 TIMES DAILY
Status: DISCONTINUED | OUTPATIENT
Start: 2018-06-25 | End: 2018-06-26 | Stop reason: HOSPADM

## 2018-06-25 RX ORDER — DEXTROSE MONOHYDRATE 25 G/50ML
25 INJECTION, SOLUTION INTRAVENOUS
Status: DISCONTINUED | OUTPATIENT
Start: 2018-06-25 | End: 2018-06-26 | Stop reason: HOSPADM

## 2018-06-25 RX ORDER — ONDANSETRON 2 MG/ML
4 INJECTION INTRAMUSCULAR; INTRAVENOUS EVERY 4 HOURS PRN
Status: DISCONTINUED | OUTPATIENT
Start: 2018-06-25 | End: 2018-06-26 | Stop reason: HOSPADM

## 2018-06-25 RX ORDER — LABETALOL HYDROCHLORIDE 5 MG/ML
10 INJECTION, SOLUTION INTRAVENOUS EVERY 4 HOURS PRN
Status: DISCONTINUED | OUTPATIENT
Start: 2018-06-25 | End: 2018-06-26 | Stop reason: HOSPADM

## 2018-06-25 RX ORDER — INSULIN GLARGINE 100 [IU]/ML
36 INJECTION, SOLUTION SUBCUTANEOUS EVERY EVENING
Status: DISCONTINUED | OUTPATIENT
Start: 2018-06-25 | End: 2018-06-26 | Stop reason: HOSPADM

## 2018-06-25 RX ORDER — OMEPRAZOLE 20 MG/1
20 CAPSULE, DELAYED RELEASE ORAL DAILY
Status: DISCONTINUED | OUTPATIENT
Start: 2018-06-25 | End: 2018-06-26 | Stop reason: HOSPADM

## 2018-06-25 RX ORDER — FUROSEMIDE 40 MG/1
40 TABLET ORAL
Status: ON HOLD | COMMUNITY
End: 2019-11-24 | Stop reason: SDUPTHER

## 2018-06-25 RX ORDER — ACETAMINOPHEN 500 MG
500-1000 TABLET ORAL EVERY 6 HOURS PRN
Status: ON HOLD | COMMUNITY
End: 2019-11-24

## 2018-06-25 RX ORDER — SODIUM BICARBONATE 650 MG/1
650 TABLET ORAL 2 TIMES DAILY
Status: DISCONTINUED | OUTPATIENT
Start: 2018-06-25 | End: 2018-06-26 | Stop reason: HOSPADM

## 2018-06-25 RX ORDER — DEXAMETHASONE SODIUM PHOSPHATE 10 MG/ML
10 INJECTION, SOLUTION INTRAMUSCULAR; INTRAVENOUS ONCE
Status: COMPLETED | OUTPATIENT
Start: 2018-06-25 | End: 2018-06-25

## 2018-06-25 RX ORDER — PRASUGREL 10 MG/1
TABLET, FILM COATED ORAL
Status: COMPLETED
Start: 2018-06-25 | End: 2018-06-25

## 2018-06-25 RX ORDER — ASPIRIN 81 MG/1
81 TABLET, CHEWABLE ORAL DAILY
Status: DISCONTINUED | OUTPATIENT
Start: 2018-06-25 | End: 2018-06-26 | Stop reason: HOSPADM

## 2018-06-25 RX ORDER — OXYCODONE HYDROCHLORIDE 5 MG/1
10 TABLET ORAL
Status: DISCONTINUED | OUTPATIENT
Start: 2018-06-25 | End: 2018-06-26 | Stop reason: HOSPADM

## 2018-06-25 RX ORDER — HYDROXYZINE HYDROCHLORIDE 25 MG/1
50 TABLET, FILM COATED ORAL EVERY 6 HOURS PRN
Status: DISCONTINUED | OUTPATIENT
Start: 2018-06-25 | End: 2018-06-26 | Stop reason: HOSPADM

## 2018-06-25 RX ORDER — SODIUM CHLORIDE 9 MG/ML
INJECTION, SOLUTION INTRAVENOUS CONTINUOUS
Status: DISCONTINUED | OUTPATIENT
Start: 2018-06-25 | End: 2018-06-26 | Stop reason: HOSPADM

## 2018-06-25 RX ADMIN — PRASUGREL HYDROCHLORIDE 10 MG: 10 TABLET, FILM COATED ORAL at 21:54

## 2018-06-25 RX ADMIN — INSULIN HUMAN 7 UNITS: 100 INJECTION, SOLUTION PARENTERAL at 21:41

## 2018-06-25 RX ADMIN — SODIUM CHLORIDE 500 ML: 9 INJECTION, SOLUTION INTRAVENOUS at 16:18

## 2018-06-25 RX ADMIN — DIPHENHYDRAMINE HCL 50 MG: 25 TABLET ORAL at 21:27

## 2018-06-25 RX ADMIN — ASPIRIN 81 MG 81 MG: 81 TABLET ORAL at 21:27

## 2018-06-25 RX ADMIN — INSULIN GLARGINE 36 UNITS: 100 INJECTION, SOLUTION SUBCUTANEOUS at 21:45

## 2018-06-25 RX ADMIN — OMEPRAZOLE 20 MG: 20 CAPSULE, DELAYED RELEASE ORAL at 21:28

## 2018-06-25 RX ADMIN — CARVEDILOL 6.25 MG: 6.25 TABLET, FILM COATED ORAL at 21:28

## 2018-06-25 RX ADMIN — SODIUM CHLORIDE: 9 INJECTION, SOLUTION INTRAVENOUS at 21:47

## 2018-06-25 RX ADMIN — SODIUM BICARBONATE 650 MG TABLET 650 MG: at 21:28

## 2018-06-25 RX ADMIN — SITAGLIPTIN 100 MG: 50 TABLET, FILM COATED ORAL at 21:26

## 2018-06-25 RX ADMIN — HEPARIN SODIUM 5000 UNITS: 5000 INJECTION, SOLUTION INTRAVENOUS; SUBCUTANEOUS at 21:26

## 2018-06-25 RX ADMIN — DEXAMETHASONE SODIUM PHOSPHATE 10 MG: 10 INJECTION, SOLUTION INTRAMUSCULAR; INTRAVENOUS at 17:26

## 2018-06-25 RX ADMIN — PREDNISONE 40 MG: 20 TABLET ORAL at 21:27

## 2018-06-25 ASSESSMENT — COPD QUESTIONNAIRES
IN THE PAST 12 MONTHS DO YOU DO LESS THAN YOU USED TO BECAUSE OF YOUR BREATHING PROBLEMS: AGREE
DURING THE PAST 4 WEEKS HOW MUCH DID YOU FEEL SHORT OF BREATH: SOME OF THE TIME
COPD SCREENING SCORE: 7
DURING THE PAST 4 WEEKS HOW MUCH DID YOU FEEL SHORT OF BREATH: SOME OF THE TIME
DO YOU EVER COUGH UP ANY MUCUS OR PHLEGM?: YES, A FEW DAYS A WEEK OR MONTH
DO YOU EVER COUGH UP ANY MUCUS OR PHLEGM?: YES, A FEW DAYS A WEEK OR MONTH
HAVE YOU SMOKED AT LEAST 100 CIGARETTES IN YOUR ENTIRE LIFE: YES
COPD SCREENING SCORE: 7
HAVE YOU SMOKED AT LEAST 100 CIGARETTES IN YOUR ENTIRE LIFE: YES

## 2018-06-25 ASSESSMENT — LIFESTYLE VARIABLES
EVER_SMOKED: YES
EVER_SMOKED: YES
ALCOHOL_USE: NO

## 2018-06-25 ASSESSMENT — PAIN SCALES - GENERAL
PAINLEVEL_OUTOF10: 8
PAINLEVEL_OUTOF10: 4
PAINLEVEL_OUTOF10: 10

## 2018-06-25 ASSESSMENT — PATIENT HEALTH QUESTIONNAIRE - PHQ9
2. FEELING DOWN, DEPRESSED, IRRITABLE, OR HOPELESS: NOT AT ALL
1. LITTLE INTEREST OR PLEASURE IN DOING THINGS: NOT AT ALL
SUM OF ALL RESPONSES TO PHQ9 QUESTIONS 1 AND 2: 0

## 2018-06-25 NOTE — ED NOTES
Rounded on pt in lobby.  Apologized for wait and assured that he will be roomed as soon as possible.

## 2018-06-25 NOTE — ED NOTES
Luis Sepulveda 72 y.o. male   Ambulatory to triage with wife.    Chief Complaint   Patient presents with   • Joint Pain     Diffuse.  Worst pain in shoulders. Radiates.  Seen 3 weeks for same.        Pt returned to [facility] and educated on triage process.  Advised to notify RN with changes or concerns.

## 2018-06-26 ENCOUNTER — PATIENT OUTREACH (OUTPATIENT)
Dept: HEALTH INFORMATION MANAGEMENT | Facility: OTHER | Age: 73
End: 2018-06-26

## 2018-06-26 VITALS
HEIGHT: 66 IN | DIASTOLIC BLOOD PRESSURE: 74 MMHG | RESPIRATION RATE: 18 BRPM | SYSTOLIC BLOOD PRESSURE: 157 MMHG | TEMPERATURE: 97.3 F | HEART RATE: 100 BPM | OXYGEN SATURATION: 97 % | WEIGHT: 169.31 LBS | BODY MASS INDEX: 27.21 KG/M2

## 2018-06-26 PROBLEM — E05.90 HYPERTHYROIDISM: Status: ACTIVE | Noted: 2018-06-25

## 2018-06-26 LAB
ANION GAP SERPL CALC-SCNC: 8 MMOL/L (ref 0–11.9)
BASOPHILS # BLD AUTO: 0.2 % (ref 0–1.8)
BASOPHILS # BLD: 0.01 K/UL (ref 0–0.12)
BUN SERPL-MCNC: 57 MG/DL (ref 8–22)
CALCIUM SERPL-MCNC: 8.8 MG/DL (ref 8.4–10.2)
CHLORIDE SERPL-SCNC: 108 MMOL/L (ref 96–112)
CO2 SERPL-SCNC: 18 MMOL/L (ref 20–33)
CREAT SERPL-MCNC: 4.09 MG/DL (ref 0.5–1.4)
EOSINOPHIL # BLD AUTO: 0 K/UL (ref 0–0.51)
EOSINOPHIL NFR BLD: 0 % (ref 0–6.9)
ERYTHROCYTE [DISTWIDTH] IN BLOOD BY AUTOMATED COUNT: 45.8 FL (ref 35.9–50)
GLUCOSE BLD-MCNC: 251 MG/DL (ref 65–99)
GLUCOSE SERPL-MCNC: 250 MG/DL (ref 65–99)
HAV IGM SERPL QL IA: NEGATIVE
HBV CORE IGM SER QL: NEGATIVE
HBV SURFACE AG SER QL: NEGATIVE
HCT VFR BLD AUTO: 29.1 % (ref 42–52)
HCV AB SER QL: NEGATIVE
HGB BLD-MCNC: 9.4 G/DL (ref 14–18)
IMM GRANULOCYTES # BLD AUTO: 0.08 K/UL (ref 0–0.11)
IMM GRANULOCYTES NFR BLD AUTO: 1.6 % (ref 0–0.9)
LYMPHOCYTES # BLD AUTO: 0.39 K/UL (ref 1–4.8)
LYMPHOCYTES NFR BLD: 7.6 % (ref 22–41)
MCH RBC QN AUTO: 28.6 PG (ref 27–33)
MCHC RBC AUTO-ENTMCNC: 32.3 G/DL (ref 33.7–35.3)
MCV RBC AUTO: 88.4 FL (ref 81.4–97.8)
MONOCYTES # BLD AUTO: 0.09 K/UL (ref 0–0.85)
MONOCYTES NFR BLD AUTO: 1.7 % (ref 0–13.4)
NEUTROPHILS # BLD AUTO: 4.59 K/UL (ref 1.82–7.42)
NEUTROPHILS NFR BLD: 88.9 % (ref 44–72)
NRBC # BLD AUTO: 0 K/UL
NRBC BLD-RTO: 0 /100 WBC
PLATELET # BLD AUTO: 250 K/UL (ref 164–446)
PMV BLD AUTO: 9.1 FL (ref 9–12.9)
POTASSIUM SERPL-SCNC: 5.2 MMOL/L (ref 3.6–5.5)
RBC # BLD AUTO: 3.29 M/UL (ref 4.7–6.1)
SODIUM SERPL-SCNC: 134 MMOL/L (ref 135–145)
WBC # BLD AUTO: 5.2 K/UL (ref 4.8–10.8)

## 2018-06-26 PROCEDURE — G0378 HOSPITAL OBSERVATION PER HR: HCPCS

## 2018-06-26 PROCEDURE — A9270 NON-COVERED ITEM OR SERVICE: HCPCS | Performed by: HOSPITALIST

## 2018-06-26 PROCEDURE — 700102 HCHG RX REV CODE 250 W/ 637 OVERRIDE(OP): Performed by: HOSPITALIST

## 2018-06-26 PROCEDURE — 99217 PR OBSERVATION CARE DISCHARGE: CPT | Performed by: HOSPITALIST

## 2018-06-26 PROCEDURE — 85025 COMPLETE CBC W/AUTO DIFF WBC: CPT

## 2018-06-26 PROCEDURE — A9270 NON-COVERED ITEM OR SERVICE: HCPCS | Performed by: INTERNAL MEDICINE

## 2018-06-26 PROCEDURE — 82962 GLUCOSE BLOOD TEST: CPT

## 2018-06-26 PROCEDURE — 700105 HCHG RX REV CODE 258: Performed by: INTERNAL MEDICINE

## 2018-06-26 PROCEDURE — 700111 HCHG RX REV CODE 636 W/ 250 OVERRIDE (IP): Performed by: INTERNAL MEDICINE

## 2018-06-26 PROCEDURE — 700102 HCHG RX REV CODE 250 W/ 637 OVERRIDE(OP): Performed by: INTERNAL MEDICINE

## 2018-06-26 PROCEDURE — 80048 BASIC METABOLIC PNL TOTAL CA: CPT

## 2018-06-26 RX ORDER — METHIMAZOLE 5 MG/1
5 TABLET ORAL DAILY
Qty: 90 TAB | Refills: 1 | Status: SHIPPED | OUTPATIENT
Start: 2018-06-26 | End: 2019-11-11

## 2018-06-26 RX ORDER — METHIMAZOLE 5 MG/1
5 TABLET ORAL DAILY
Status: DISCONTINUED | OUTPATIENT
Start: 2018-06-26 | End: 2018-06-26 | Stop reason: HOSPADM

## 2018-06-26 RX ADMIN — PRASUGREL HYDROCHLORIDE 10 MG: 10 TABLET, FILM COATED ORAL at 09:20

## 2018-06-26 RX ADMIN — SODIUM BICARBONATE 650 MG TABLET 650 MG: at 09:14

## 2018-06-26 RX ADMIN — METHIMAZOLE 5 MG: 5 TABLET ORAL at 10:51

## 2018-06-26 RX ADMIN — CARVEDILOL 6.25 MG: 6.25 TABLET, FILM COATED ORAL at 09:13

## 2018-06-26 RX ADMIN — SODIUM CHLORIDE: 9 INJECTION, SOLUTION INTRAVENOUS at 06:14

## 2018-06-26 RX ADMIN — HEPARIN SODIUM 5000 UNITS: 5000 INJECTION, SOLUTION INTRAVENOUS; SUBCUTANEOUS at 06:14

## 2018-06-26 RX ADMIN — OMEPRAZOLE 20 MG: 20 CAPSULE, DELAYED RELEASE ORAL at 09:13

## 2018-06-26 RX ADMIN — SITAGLIPTIN 100 MG: 50 TABLET, FILM COATED ORAL at 09:15

## 2018-06-26 RX ADMIN — ASPIRIN 81 MG 81 MG: 81 TABLET ORAL at 09:14

## 2018-06-26 RX ADMIN — INSULIN HUMAN 7 UNITS: 100 INJECTION, SOLUTION PARENTERAL at 06:18

## 2018-06-26 RX ADMIN — PREDNISONE 40 MG: 20 TABLET ORAL at 09:12

## 2018-06-26 ASSESSMENT — PAIN SCALES - GENERAL: PAINLEVEL_OUTOF10: 0

## 2018-06-26 NOTE — PROGRESS NOTES
Sl dc'd site intact  Pt given dc inst and agrees to  his meds at pharmacy on file  Waiting for transportation home

## 2018-06-26 NOTE — CONSULTS
DATE OF SERVICE:  06/25/2018    REASON FOR CONSULTATION:  Chronic kidney disease and electrolyte   abnormalities.    HISTORY OF PRESENT ILLNESS:  The patient is a 72-year-old gentleman with a   history of CKD stage V, hypertension, congestive heart failure, diabetes, and   arthritis who presented to the hospital for worsening arthritis.  The patient   was seen approximately 3 weeks ago with left knee pain, which subsided with   narcotics and Tylenol.  Unfortunately, it has started to progress to include   his right hip and right hand.  The patient has not been taking any   nonsteroidal anti-inflammatory medications.  He received his first steroids   tonight.    Of note, the patient does drink a substantial amount of water every day, but   he has no falls or decreased mental capacity per his report.    PAST MEDICAL HISTORY:  Reviewed in the HPI.    SOCIAL HISTORY:  The patient is retired from [employer] and most recently   retired from a paying company.  He quit smoking 5 years ago.  He does not   drink alcohol.    FAMILY HISTORY:  Significant for coronary artery disease and alcoholism.    MEDICATIONS:  1.  Aspirin.  2.  Carvedilol.  3.  Glargine.  4.  Omeprazole.  5.  Effient.  6.  Prednisone.  7.  Sodium bicarbonate.  8.  Januvia.    REVIEW OF SYSTEMS:  GENERAL:  The patient's weight has been stable.  HEENT:  Denies difficulty seeing, hearing, or swallowing.  CARDIOVASCULAR:  He has had no chest pain or palpitations.  PULMONARY:  He has had no shortness of breath or cough.  GASTROINTESTINAL:  He has had no diarrhea or constipation.  GENITOURINARY:  He has some nocturia.  EXTREMITIES:  He has had no edema.  MUSCULOSKELETAL:  He has significant polyarticular joint pain involving his   left knee, left hip, left wrist, right knee, and right wrist.    PHYSICAL EXAMINATION:  VITAL SIGNS:  His blood pressure has ranged from 130s-160s/70s-60s.  He is   afebrile.  His heart rate is 90s.  His respirations are 16.  GENERAL:   He is sitting up in bed in no acute distress.  HEENT:  His extraocular muscles are intact.  His sclerae are anicteric.  His   oropharynx is clear.  NECK:  Supple.  He has no JVP.  HEART:  Regular rate, S1, S2.  LUNGS:  Clear to auscultation bilaterally.  ABDOMEN:  Soft and nontender.  EXTREMITIES:  He has no lower extremity swelling.  He does not have any   significant joint edema.    LABORATORY DATA:  His white blood cell count is 7.6, his hemoglobin is 10, and   his platelet counts 274.  His sodium is 131, potassium 4.8, chloride 101,   bicarbonate 23, glucose 192, BUN is 52, and creatinine 4.43.  Calcium is 9.    Albumin is 3.5.  Troponin is negative.  BNP is negative.    IMAGING:  Just shows some chronic changes.    CONCLUSION:  The patient is a 72-year-old gentleman who presented with   polyarthritis.  1.  Polyarthritis, question of inflammatory arthritis.  His evaluation is   ongoing.  He is receiving prednisone.  2.  Anemia, likely related to his chronic kidney disease as well, potentially   inflammatory arthritis.  3.  Hyponatremia secondary to increased volume intake in the face of renal   failure.  I should limit his fluid.  4.  Chronic kidney disease stage V, at baseline.    PLAN:  At this point, steroids, antihypertensives, avoid ACE at this time, and   fluid restricted 2 liters.    Thank you very much for the consultation.       ____________________________________     MD BE GIRON / DALILA    DD:  06/25/2018 19:55:29  DT:  06/25/2018 20:36:24    D#:  [id number]  Job#:  [id number]    cc: Jonatan Shearer M.D.

## 2018-06-26 NOTE — PROGRESS NOTES
1900 Received report from day shift JAN Abarca. Plan of care discussed at bedside. Patient resting comfortably in bed at this time with no complaints. Safety precautions and hourly rounding in place.    2100 Admit profile and assessment completed.    2130 Due medications given. FSBS 254.    0100 Patient up to bathroom, steady gait. Provided quiet pack for comfort.    0500 Patient sleeping.    0700 Report to Rima MONTOYA, POC discussed.

## 2018-06-26 NOTE — CARE PLAN
Problem: Communication  Goal: The ability to communicate needs accurately and effectively will improve    Intervention: [place] patient and significant other/support system to call light to alert staff of needs   06/26/18 1117   OTHER   Oriented to: All of the Following : Location of Bathroom, Visiting Policy, Unit Routine, Call Light and Bedside Controls, Bedside Rail Policy, Smoking Policy, Rights and Responsibilities, Bedside Report, and Patient Education Notebook

## 2018-06-26 NOTE — H&P
Hospital Medicine History and Physical    Date of Service  6/25/2018    Chief Complaint  Chief Complaint   Patient presents with   • Joint Pain     Diffuse.  Worst pain in shoulders. Radiates.  Seen 3 weeks for same.       History of Presenting Illness  72 y.o. Male with CKD stage IV, type 2 diabetes mellitus, coronary artery disease, hypertension, who presented 6/25/2018 with polyarthritis.    His issues started about 1-1/2 months ago, when he started to feel sharp pain on the left knee, worse with walking, rated 8 out of 10 in severity, with mild swelling.  He thought that he twisted his knee, however the pain persisted and subsequently became spastic and crampy making it difficult to walk.  In the next several weeks, the pain and swelling progressed in involved the left elbow, wrist, and shoulder.  It has now involved the right shoulder wrist and knuckles.  He is started to feel fatigued, with loss of energy, and generally feeling unwell.  He had intermittent subjective slight fever, but no chills, with occasional nausea and vomiting usually in the morning.  He admits to weight loss of about 14 pounds in the last 2 months which was unintentional.  He does not have any chest pain, shortness of breath, diarrhea, or no changes in his urination.     Today, the pain is more severe that prompted him to go to the ED.    ED course:  The patient was initially evaluated in the emergency department, was obtaining adequate hemodynamics.  He has a temperature of 37.7°C.  Initial blood workup showed no leukocytosis, with left shift but no bandemia, with sodium of 131, and creatinine of 4.43 up from his baseline of 3-3.9.  TSH was suppressed at 0.010.  CRP was elevated at 7.46.  Chest x-ray (my read) did not show any infiltrates or consolidation.  Patient was given IV Decadron, and IV fluids.  Nephrology was consulted.  Patient was subsequently admitted to the hospitalist service.      Primary Care Physician  Pcp Pt States  None    Consultants  Nephrology    Code Status  Full    Review of Systems  ROS    Pertinent positives/negatives as mentioned above.     A complete review of systems was done. All other systems were negative.        Past Medical History  Past Medical History:   Diagnosis Date   • CAD (coronary artery disease)     stents   • Diabetes    • Hypertension    • Peripheral neuropathy (HCC)        Surgical History  Past Surgical History:   Procedure Laterality Date   • APPENDECTOMY     • OTHER CARDIAC SURGERY      stents   • OTHER ORTHOPEDIC SURGERY      knee surgery       Medications  No current facility-administered medications on file prior to encounter.      Current Outpatient Prescriptions on File Prior to Encounter   Medication Sig Dispense Refill   • carvedilol (COREG) 6.25 MG Tab Take 6.25 mg by mouth 2 times a day, with meals.     • sodium bicarbonate (SODIUM BICARBONATE) 650 MG Tab Take 650 mg by mouth 2 times a day.     • LANTUS SOLOSTAR 100 UNIT/ML Solution Pen-injector injection 36 Units by Intramuscular route every evening.     • linagliptin (TRADJENTA) 5 MG Tab tablet Take 5 mg by mouth every day.     • pantoprazole (PROTONIX) 40 MG Tablet Delayed Response Take 40 mg by mouth every day.     • hydrOXYzine HCl (ATARAX) 50 MG Tab Take 50 mg by mouth every 6 hours as needed for Itching.     • aspirin (ASA) 81 MG CHEW Take 81 mg by mouth every day.     • prasugrel (EFFIENT) 10 MG TABS Take 10 mg by mouth every day.         Family History  History reviewed. No pertinent family history.    Social History  Social History   Substance Use Topics   • Smoking status: Former Smoker     Years: 55.00     Quit date: 2014   • Smokeless tobacco: Never Used   • Alcohol use No       Allergies  No Known Allergies     Physical Exam  Laboratory   Hemodynamics  Temp (24hrs), Av.7 °C (99.9 °F), Min:37.7 °C (99.9 °F), Max:37.7 °C (99.9 °F)   Temperature: 37.7 °C (99.9 °F)  Pulse  Av.3  Min: 95  Max: 112    Blood Pressure  : 155/82, NIBP: (!) 175/90      Respiratory      Respiration: 16, Pulse Oximetry: 99 %             Physical Exam   Constitutional: He is oriented to person, place, and time. He appears well-developed. No distress.   HENT:   Head: Normocephalic and atraumatic.   Mouth/Throat: No oropharyngeal exudate.   Dry lips and oral mucosa   Eyes: EOM are normal. Pupils are equal, round, and reactive to light. Right eye exhibits no discharge. Left eye exhibits no discharge. No scleral icterus.   Neck: Normal range of motion. Neck supple. No thyromegaly present.   Cardiovascular: Normal rate and regular rhythm.  Exam reveals no gallop and no friction rub.    No murmur heard.  Pulmonary/Chest: Effort normal and breath sounds normal. He has no wheezes. He has no rales. He exhibits no tenderness.   Abdominal: Soft. Bowel sounds are normal. There is no tenderness. There is no rebound and no guarding.   Musculoskeletal: He exhibits no edema.   Mild swelling, tenderness, and limited range of motion of the left knee, bilateral wrists, knuckles of the bilateral hands, bilateral shoulders.  No erythema.  No joint effusions.   Lymphadenopathy:     He has no cervical adenopathy.   Neurological: He is alert and oriented to person, place, and time. No cranial nerve deficit. Coordination normal.   Skin: Skin is warm and dry. No rash noted. He is not diaphoretic. No erythema.   Psychiatric: He has a normal mood and affect. His behavior is normal. Thought content normal.   Vitals reviewed.      Recent Labs      06/25/18   1605   WBC  10.6   RBC  3.48*   HEMOGLOBIN  10.0*   HEMATOCRIT  29.9*   MCV  85.9   MCH  28.7   MCHC  33.4*   RDW  45.2   PLATELETCT  274   MPV  8.2*     Recent Labs      06/25/18   1605   SODIUM  131*   POTASSIUM  4.8   CHLORIDE  101   CO2  23   GLUCOSE  192*   BUN  52*   CREATININE  4.43*   CALCIUM  9.0     Recent Labs      06/25/18   1605   ALTSGPT  17   ASTSGOT  15   ALKPHOSPHAT  114*   TBILIRUBIN  0.6   GLUCOSE  192*                  Lab Results   Component Value Date    TROPONINI <0.02 06/07/2018     Urinalysis:    Lab Results  Component Value Date/Time   SPECGRAVITY 1.016 09/19/2013 1510   GLUCOSEUR 100 (A) 09/19/2013 1510   KETONES Negative 09/19/2013 1510   NITRITE Negative 09/19/2013 1510   RBCURINE 2-5 (A) 09/19/2013 1510   BACTERIA Few (A) 09/19/2013 1510        Imaging  Reviewed.  Please refer to ED course   Assessment/Plan     I anticipate this patient is appropriate for observation status at this time.    * Polyarthritis- (present on admission)   Assessment & Plan    -Unclear etiology.  I will further work him up serologic studies including ESR, LALIT with reflex, anti-CCP, complement levels, viral hepatitis panel, parvovirus serology, rheumatoid factor, and celiac antibodies.  I will also obtain x-rays of the left knee, both hands, and both shoulders.  He definitely would need rheumatology consult, either outpatient or in the hospital.   -With his multiple joint pain and swelling, I think it is reasonable to start him on a course of prednisone 40 mg daily.   -I will get PT and OT to evaluate him.  I will put him on as needed pain medications including Norco, oxycodone, and IV morphine.  Hold NSAIDs due to CKD.        Acute renal failure superimposed on stage 4 chronic kidney disease (HCC)- (present on admission)   Assessment & Plan    -Likely prerenal.  -I will rehydrate him with IV NS at 100 cc/h. Follow for improvement in renal function with hydration.  Hold Lasix for now. I asked the ED physician to call nephrology for further inputs.  - avoid nephrotoxins, and continue to renally dose all medications.        Low TSH level- (present on admission)   Assessment & Plan    -I will further work him up with T3, total T4, and free T4.        Hypovolemic hyponatremia- (present on admission)   Assessment & Plan    -Rehydrate with IV normal saline at 100 cc/h.  Follow sodium levels, BMP in the morning.        CAD (coronary artery  disease)- (present on admission)   Assessment & Plan    -Stable.  No ACS. Resume aspirin, Effient, and Coreg.        HTN (hypertension)- (present on admission)   Assessment & Plan    -Resume home dose Coreg.  Holding Lasix for now due to PAT.  Monitor blood pressure trend closely, with as needed IV labetalol for significant hypertension.        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  Accu-Cheks before meals and at bedtime.  Diabetic diet.            VTE prophylaxis: heparin SQ.

## 2018-06-26 NOTE — ASSESSMENT & PLAN NOTE
-Resume home dose Coreg.  Holding Lasix for now due to PAT.  Monitor blood pressure trend closely, with as needed IV labetalol for significant hypertension.

## 2018-06-26 NOTE — DISCHARGE INSTRUCTIONS
Discharge Instructions    Discharged to home by car with relative. Discharged via wheelchair, hospital escort: Yes.  Special equipment needed: Not Applicable    Be sure to schedule a follow-up appointment with your primary care doctor or any specialists as instructed.     Discharge Plan:   Diet Plan: Discussed  Activity Level: Discussed  Confirmed Follow up Appointment: Appointment Scheduled  Confirmed Symptoms Management: Discussed  Medication Reconciliation Updated: Yes  Pneumococcal Vaccine Administered/Refused: Not given - Patient refused pneumococcal vaccine  Influenza Vaccine Indication: Patient Refuses    I understand that a diet low in cholesterol, fat, and sodium is recommended for good health. Unless I have been given specific instructions below for another diet, I accept this instruction as my diet prescription.   Other diet: diet as tolerated        · Is patient discharged on Warfarin / Coumadin?   No       Hyperthyroidism  Hyperthyroidism is when the thyroid is too active (overactive). Your thyroid is a large gland that is located in your neck. The thyroid helps to control how your body uses food (metabolism). When your thyroid is overactive, it produces too much of a hormone called thyroxine.  What are the causes?  Causes of hyperthyroidism may include:  · Graves disease. This is when your immune system attacks the thyroid gland. This is the most common cause.  · Inflammation of the thyroid gland.  · Tumor in the thyroid gland or somewhere else.  · Excessive use of thyroid medicines, including:  ¨ Prescription thyroid supplement.  ¨ Herbal supplements that mimic thyroid hormones.  · Solid or fluid-filled lumps within your thyroid gland (thyroid nodules).  · Excessive ingestion of iodine.  What increases the risk?  · Being female.  · Having a family history of thyroid conditions.  What are the signs or symptoms?  Signs and symptoms of hyperthyroidism may include:  · Nervousness.  · Inability to tolerate  heat.  · Unexplained weight loss.  · Diarrhea.  · Change in the texture of hair or skin.  · Heart skipping beats or making extra beats.  · Rapid heart rate.  · Loss of menstruation.  · Shaky hands.  · Fatigue.  · Restlessness.  · Increased appetite.  · Sleep problems.  · Enlarged thyroid gland or nodules.  How is this diagnosed?  Diagnosis of hyperthyroidism may include:  · Medical history and physical exam.  · Blood tests.  · Ultrasound tests.  How is this treated?  Treatment may include:  · Medicines to control your thyroid.  · Surgery to remove your thyroid.  · Radiation therapy.  Follow these instructions at home:  · Take medicines only as directed by your health care provider.  · Do not use any tobacco products, including cigarettes, chewing tobacco, or electronic cigarettes. If you need help quitting, ask your health care provider.  · Do not exercise or do physical activity until your health care provider approves.  · Keep all follow-up appointments as directed by your health care provider. This is important.  Contact a health care provider if:  · Your symptoms do not get better with treatment.  · You have fever.  · You are taking thyroid replacement medicine and you:  ¨ Have depression.  ¨ Feel mentally and physically slow.  ¨ Have weight gain.  Get help right away if:  · You have decreased alertness or a change in your awareness.  · You have abdominal pain.  · You feel dizzy.  · You have a rapid heartbeat.  · You have an irregular heartbeat.  This information is not intended to replace advice given to you by your health care provider. Make sure you discuss any questions you have with your health care provider.  Document Released: 12/18/2006 Document Revised: 05/18/2017 Document Reviewed: 05/05/2015  Elsevier Interactive Patient Education © 2017 [employer]      Depression / Suicide Risk    As you are discharged from this [facility] facility, it is important to learn how to keep safe from harming  yourself.    Recognize the warning signs:  · Abrupt changes in personality, positive or negative- including increase in energy   · Giving away possessions  · Change in eating patterns- significant weight changes-  positive or negative  · Change in sleeping patterns- unable to sleep or sleeping all the time   · Unwillingness or inability to communicate  · Depression  · Unusual sadness, discouragement and loneliness  · Talk of wanting to die  · Neglect of personal appearance   · Rebelliousness- reckless behavior  · Withdrawal from people/activities they love  · Confusion- inability to concentrate     If you or a loved one observes any of these behaviors or has concerns about self-harm, here's what you can do:  · Talk about it- your feelings and reasons for harming yourself  · Remove any means that you might use to hurt yourself (examples: pills, rope, extension cords, firearm)  · Get professional help from the community (Mental Health, Substance Abuse, psychological counseling)  · Do not be alone:Call your Safe Contact- someone whom you trust who will be there for you.  · Call your local CRISIS HOTLINE 596-5001 or 967-273-8386  · Call your local Children's Mobile Crisis Response Team [place] (238) 628-9955 or www.baixing.com  · Call the toll free National Suicide Prevention Hotlines   · National Suicide Prevention Lifeline 581-571-TZIN (7854)  · National Hope Line Network 800-SUICIDE (157-3004)

## 2018-06-26 NOTE — ED PROVIDER NOTES
ED Provider Note    CHIEF COMPLAINT  Chief Complaint   Patient presents with   • Joint Pain     Diffuse.  Worst pain in shoulders. Radiates.  Seen 3 weeks for same.       HPI  Luis Sepulveda is a 72 y.o. male who presents to the emergency department complaining of worsening and severe joint pain and weakness.  The patient says that symptoms began about 2 months ago with left knee pain of no recognized etiology.  There is no trauma or precipitating event as far as she is aware of but the joint pain then spread to the shoulders and progressively has gotten worse and worse and now it is in all the joints in his hands and feet are swollen and he cannot lift his arms over his head because of shoulder pain.  The patient was seen here during the first week of June and was advised to follow-up with a primary care doctor but he says he has not been able to do so.  He does have a nephrologist Dr. Freeman Holland who he sees for chronic renal failure but says that he does not have any other doctors.    REVIEW OF SYSTEMS no chest pain no shortness of breath no fever or chills.  All other systems negative    PAST MEDICAL HISTORY  Past Medical History:   Diagnosis Date   • CAD (coronary artery disease)     stents   • Diabetes    • Hypertension    • Peripheral neuropathy (HCC)        FAMILY HISTORY  History reviewed. No pertinent family history.    SOCIAL HISTORY  Social History     Social History   • Marital status:      Spouse name: N/A   • Number of children: N/A   • Years of education: N/A     Social History Main Topics   • Smoking status: Former Smoker     Years: 55.00     Quit date: 1/23/2014   • Smokeless tobacco: Never Used   • Alcohol use No   • Drug use: No   • Sexual activity: Not on file     Other Topics Concern   • Not on file     Social History Narrative   • No narrative on file       SURGICAL HISTORY  Past Surgical History:   Procedure Laterality Date   • APPENDECTOMY     • OTHER CARDIAC SURGERY       "stents   • OTHER ORTHOPEDIC SURGERY      knee surgery       CURRENT MEDICATIONS  Home Medications    **Home medications have not yet been reviewed for this encounter**         ALLERGIES  No Known Allergies    PHYSICAL EXAM  VITAL SIGNS: /82   Pulse 95   Temp 37.7 °C (99.9 °F)   Resp 18   Ht 1.676 m (5' 6\")   Wt 77.7 kg (171 lb 4.8 oz)   SpO2 96%   BMI 27.65 kg/m²    Oxygen saturation is interpreted as adequate  Constitutional: Awake and uncomfortable appearing  HENT: Mucous membranes are dry  Eyes: No erythema or discharge or jaundice  Neck: Trachea midline no JVD no C-spine tenderness or meningeal findings  Cardiovascular: Regular tachycardia at the time of arrival  Lungs: Clear and equal bilaterally with no apparent difficulty breathing  Abdomen/Back: Soft nondistended nontender no peritoneal findings  Skin: Warm and dry  Musculoskeletal: There is no acute bony deformity but the hands and the feet are mildly edematous and all of the joints are very tender to touch.  Neurologic: Awake verbal she is able to move his extremities but range of motion is limited because of pain    CHART REVIEW  I reviewed the ER note from June 7, 2018 the patient was here with similar complaints and was evaluated and found to have a elevated BUN and creatinine and C-reactive protein of 3.37 with elevated ESR.  The patient was given a prescription for Norco and was advised to follow-up with primary care.    Laboratory  Today in the emergency department CBC shows white blood cell count of 10.6 the immature granulocyte count is elevated at 1.2.  Basic metabolic panel shows an elevated BUN of 52 and creatinine of 4.43 with a GFR of 13 and for comparison on June 7, 2018 the GFR was 15 and on January 28 of 2014 the GFR was 21.  The blood sugar is elevated today at 192 alk phos is elevated at 114.  The TSH is very low at 0.01 indicating hyperthyroidism.  The C-reactive protein has nearly doubled at 7.46 and the ESR is pending at " this time.    Radiology  DX-CHEST-PORTABLE (1 VIEW)   Final Result      No acute cardiopulmonary findings.            MEDICAL DECISION MAKING and DISPOSITION  In the emergency department an IV was established and the patient was placed on a cardiac monitor, the patient had dry mucous membranes and tachycardia at the time of arrival and he was given intravenous fluids and this has been helpful as his heart rate is normalizing.  Multiple abnormalities have been identified and I have reviewed the case with the hospitalist and the patient will be admitted for further evaluation and treatment and I have placed a call to nephrology for consultation as well.  I reviewed the results thus far available with the patient and I have also ordered an intravenous dose of Decadron.    IMPRESSION  1.  Severe polyarticular arthritis of unknown etiology  2.  Chronic renal failure with worsening GFR over time  3.  Hyperthyroidism      Electronically signed by: Miguel Sears, 6/25/2018 5:07 PM

## 2018-06-26 NOTE — CARE PLAN
Problem: Safety  Goal: Will remain free from injury    Intervention: Provide assistance with mobility   06/26/18 1117   OTHER   Assistance / Tolerance No assistance required;Tolerates Well

## 2018-06-26 NOTE — ASSESSMENT & PLAN NOTE
-I will resume his home dose Tradjenta, and Lantus, and start him on sliding scale insulin coverage while in-house.  Accu-Cheks before meals and at bedtime.  Diabetic diet.

## 2018-06-26 NOTE — RESPIRATORY CARE
COPD EDUCATION by COPD CLINICAL EDUCATOR  6/26/2018 at 7:46 AM by Jacqueline Chakraborty     Patient reviewed by COPD education team. Patient does not qualify for COPD program.

## 2018-06-26 NOTE — ASSESSMENT & PLAN NOTE
-Likely prerenal.  -I will rehydrate him with IV NS at 100 cc/h. Follow for improvement in renal function with hydration.  Hold Lasix for now. I asked the ED physician to call nephrology for further inputs.  - avoid nephrotoxins, and continue to renally dose all medications.

## 2018-06-26 NOTE — CARE PLAN
Problem: Communication  Goal: The ability to communicate needs accurately and effectively will improve  Outcome: PROGRESSING AS EXPECTED    Intervention: [place] patient and significant other/support system to call light to alert staff of needs  Patient oriented to call light system and all relevant hospital policies. Call light within reach and used appropriately when in need of assistance.        Problem: Infection  Goal: Will remain free from infection  Outcome: PROGRESSING AS EXPECTED    Intervention: Implement standard precautions and perform hand washing before and after patient contact  Standard precautions and hand hygiene practices implemented before and after patient room entry. Gloves worn during times of patient contact.

## 2018-06-26 NOTE — DISCHARGE SUMMARY
Discharge Summary    CHIEF COMPLAINT ON ADMISSION  Chief Complaint   Patient presents with   • Joint Pain     Diffuse.  Worst pain in shoulders. Radiates.  Seen 3 weeks for same.       Reason for Admission  joint pain     Admission Date  6/25/2018    CODE STATUS  Prior    HPI & HOSPITAL COURSE  This is a 72 y.o. male here with complaints of polyarthralgias. He was admitted with intracable pain. A workup was started and he was then found to have a TSH of .01. Free t4/t3 are at the upper limits of normal. On further questioning he has other symptoms of  Hyperthyroidism. He has been started on methimazole and directed to his primary care provider for further workup and referral as needed. He was initially given steroids which he responded well to. These will be stopped for now but may need to be restarted if he has recurrent symptoms. The remainder of his workup to date is normal.        Therefore, he is discharged in good and stable condition to home with close outpatient follow-up.        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 *      FOLLOW UP  Future Appointments  Date Time Provider Department Center   8/2/2018 11:00 AM TK Torres M.D.  [address] [place] [address] Dr NIA FERRELL [id number]  [phone]      The office requests that you call after you are discharged to schedule a follow up appointment with Nephrology.      MEDICATIONS ON DISCHARGE     Medication List      START taking these medications       Instructions   methimazole 5 MG Tabs  Commonly known as:  TAPAZOLE   Take 1 Tab by mouth every day.  Dose:  5 mg        CONTINUE taking these medications      Instructions   acetaminophen 500 MG Tabs  Commonly known as:  TYLENOL   Take 500 mg by mouth every 6 hours as needed for Moderate Pain.  Dose:  500 mg     aspirin 81 MG Chew chewable tablet  Commonly known as:  ASA   Take 81 mg by mouth every day.  Dose:  81 mg     carvedilol 6.25 MG Tabs  Commonly known as:  COREG   Take 6.25 mg by mouth 2 times a day, with meals.  Dose:  6.25 mg     diphenhydrAMINE 25 MG Tabs  Commonly known as:  BENADRYL   Take 50 mg by mouth every bedtime.  Dose:  50 mg     EFFIENT 10 MG Tabs  Generic drug:  prasugrel   Take 10 mg by mouth every day.  Dose:  10 mg     furosemide 40 MG Tabs  Commonly known as:  LASIX   Take 40 mg by mouth as needed.  Dose:  40 mg     HYDROcodone-acetaminophen 5-325 MG Tabs per tablet  Commonly known as:  NORCO   Take 1-2 Tabs by mouth every 6 hours as needed.  Dose:  1-2 Tab     hydrOXYzine HCl 50 MG Tabs  Commonly known as:  ATARAX   Take 50 mg by mouth every 6 hours as needed for Itching.  Dose:  50 mg     LANTUS SOLOSTAR 100 UNIT/ML Sopn injection  Generic drug:  insulin glargine   36 Units by Intramuscular route every evening.  Dose:  36 Units     pantoprazole 40 MG Tbec  Commonly known as:  PROTONIX   Take 40 mg by mouth every day.  Dose:  40 mg     sodium bicarbonate 650 MG Tabs  Commonly known as:  SODIUM BICARBONATE   Take 650 mg by mouth 2 times a day.  Dose:  650 mg     TRADJENTA 5 MG Tabs tablet  Generic drug:  linagliptin   Take 5 mg by mouth every day.  Dose:  5 mg            Allergies  No Known Allergies    DIET  No orders of the defined types were placed in this encounter.      ACTIVITY  As tolerated.  Weight bearing as tolerated    CONSULTATIONS  none    PROCEDURES  none    LABORATORY  Lab Results   Component Value Date    SODIUM 134 (L) 06/26/2018    POTASSIUM 5.2 06/26/2018    CHLORIDE 108  06/26/2018    CO2 18 (L) 06/26/2018    GLUCOSE 250 (H) 06/26/2018    BUN 57 (H) 06/26/2018    CREATININE 4.09 (H) 06/26/2018        Lab Results   Component Value Date    WBC 5.2 06/26/2018    HEMOGLOBIN 9.4 (L) 06/26/2018    HEMATOCRIT 29.1 (L) 06/26/2018    PLATELETCT 250 06/26/2018        Total time of the discharge process exceeds 34 minutes.

## 2018-06-26 NOTE — ASSESSMENT & PLAN NOTE
-Unclear etiology.  I will further work him up serologic studies including ESR, LALIT with reflex, anti-CCP, complement levels, viral hepatitis panel, parvovirus serology, rheumatoid factor, and celiac antibodies.  I will also obtain x-rays of the left knee, both hands, and both shoulders.  He definitely would need rheumatology consult, either outpatient or in the hospital.   -With his multiple joint pain and swelling, I think it is reasonable to start him on a course of prednisone 40 mg daily.   -I will get PT and OT to evaluate him.  I will put him on as needed pain medications including Norco, oxycodone, and IV morphine.  Hold NSAIDs due to CKD.

## 2018-06-27 ENCOUNTER — PATIENT OUTREACH (OUTPATIENT)
Dept: HEALTH INFORMATION MANAGEMENT | Facility: OTHER | Age: 73
End: 2018-06-27

## 2018-06-27 LAB
CCP IGG SERPL-ACNC: 3 UNITS (ref 0–19)
CH50 SERPL-ACNC: 214 CAE UNITS (ref 60–144)
IGA SERPL-MCNC: 236 MG/DL (ref 68–408)
NUCLEAR IGG SER QL IA: NORMAL
TTG IGA SER IA-ACNC: 1 U/ML (ref 0–3)

## 2018-06-28 LAB
ANNOTATION COMMENT IMP: NOT DETECTED
B19V DNA SERPL NAA+PROBE-ACNC: <100 IU/ML
B19V DNA SERPL NAA+PROBE-LOG IU: <2 LOG IU/ML
SPECIMEN SOURCE: NORMAL

## 2018-08-02 ENCOUNTER — OFFICE VISIT (OUTPATIENT)
Dept: MEDICAL GROUP | Facility: MEDICAL CENTER | Age: 73
End: 2018-08-02
Payer: MEDICARE

## 2018-08-02 VITALS
SYSTOLIC BLOOD PRESSURE: 130 MMHG | OXYGEN SATURATION: 98 % | HEART RATE: 96 BPM | BODY MASS INDEX: 28.52 KG/M2 | TEMPERATURE: 98.8 F | WEIGHT: 177.47 LBS | DIASTOLIC BLOOD PRESSURE: 72 MMHG | HEIGHT: 66 IN

## 2018-08-02 DIAGNOSIS — M13.0 POLYARTHRITIS: ICD-10-CM

## 2018-08-02 DIAGNOSIS — Z00.00 HEALTH CARE MAINTENANCE: ICD-10-CM

## 2018-08-02 DIAGNOSIS — Z09 HOSPITAL DISCHARGE FOLLOW-UP: ICD-10-CM

## 2018-08-02 DIAGNOSIS — F17.201 NICOTINE DEPENDENCE IN REMISSION, UNSPECIFIED NICOTINE PRODUCT TYPE: ICD-10-CM

## 2018-08-02 DIAGNOSIS — N17.9 ACUTE RENAL FAILURE SUPERIMPOSED ON STAGE 4 CHRONIC KIDNEY DISEASE, UNSPECIFIED ACUTE RENAL FAILURE TYPE (HCC): ICD-10-CM

## 2018-08-02 DIAGNOSIS — M15.9 PRIMARY OSTEOARTHRITIS INVOLVING MULTIPLE JOINTS: ICD-10-CM

## 2018-08-02 DIAGNOSIS — N18.4 ACUTE RENAL FAILURE SUPERIMPOSED ON STAGE 4 CHRONIC KIDNEY DISEASE, UNSPECIFIED ACUTE RENAL FAILURE TYPE (HCC): ICD-10-CM

## 2018-08-02 DIAGNOSIS — E05.90 HYPERTHYROIDISM: ICD-10-CM

## 2018-08-02 DIAGNOSIS — D63.8 ANEMIA, CHRONIC DISEASE: ICD-10-CM

## 2018-08-02 DIAGNOSIS — Z79.4 TYPE 2 DIABETES MELLITUS WITH DIABETIC NEUROPATHY, WITH LONG-TERM CURRENT USE OF INSULIN (HCC): ICD-10-CM

## 2018-08-02 DIAGNOSIS — J44.1 COPD EXACERBATION (HCC): ICD-10-CM

## 2018-08-02 DIAGNOSIS — Z76.89 ENCOUNTER TO ESTABLISH CARE: ICD-10-CM

## 2018-08-02 DIAGNOSIS — I25.10 CORONARY ARTERY DISEASE INVOLVING NATIVE CORONARY ARTERY OF NATIVE HEART WITHOUT ANGINA PECTORIS: ICD-10-CM

## 2018-08-02 DIAGNOSIS — Z87.891 PERSONAL HISTORY OF NICOTINE DEPENDENCE: ICD-10-CM

## 2018-08-02 DIAGNOSIS — E11.40 TYPE 2 DIABETES MELLITUS WITH DIABETIC NEUROPATHY, WITH LONG-TERM CURRENT USE OF INSULIN (HCC): ICD-10-CM

## 2018-08-02 PROBLEM — K76.89 HEPATIC CYST: Status: ACTIVE | Noted: 2018-08-02

## 2018-08-02 PROBLEM — N28.1 RENAL CYST: Status: ACTIVE | Noted: 2018-08-02

## 2018-08-02 PROCEDURE — 99215 OFFICE O/P EST HI 40 MIN: CPT | Performed by: INTERNAL MEDICINE

## 2018-08-02 RX ORDER — PREDNISONE 10 MG/1
TABLET ORAL
Qty: 15 TAB | Refills: 0 | Status: SHIPPED | OUTPATIENT
Start: 2018-08-02 | End: 2018-08-08

## 2018-08-02 NOTE — PROGRESS NOTES
CHIEF COMPLAINT  Chief Complaint   Patient presents with   • Establish Care     NP   • Hospital Follow-up     Arthritis / Hyperthyroidism    • Referral Needed     Rheumatology   • Chronic Kidney Disease     HPI  Patient is a 72 y.o. male patient who presents today for the following     Hospital discharge follow-up, acute on chronic CKD, hypothyroidism, arthritis/OA, anemia, DM 2, CAD, hyperthyroidism, CAD  72-year-old, male, with history of DM 2 with complications, history of CKD stage V not on hemodialysis, anemia of chronic disease, CAD, was hospitalized from 6/25/18 to 6/26/18, discharge summary was reviewed with the following:    Discharge diagnosis  Principal Problem:  Polyarthritis POA: Yes  Active Problems:  Hypovolemic hyponatremia POA: Yes  Low TSH level POA: Yes  Acute renal failure superimposed on stage 4 chronic kidney disease (HCC) POA: Yes  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 male who hospitalized due to polyarthralgias.   He was admitted with intracable pain.   A workup was started and he was then found to have a TSH of .01. Free t4/t3 are at the upper limits of normal. On further questioning he has other symptoms of Hyperthyroidism. He has been started on methimazole and directed to his primary care provider for further workup and referral as needed. He was initially given steroids which he responded well to.     The remainder of his workup to date is normal.     I reviewed labs and imaging from hospitalization, as below.  He has CKD stage 4-5, with associated of anemia of chronic disease   -She has been followed up by nephrology, on Epo for anemia   -Dialysis has been postponed.    His labs did not show inflammatory arthritis:  - CCP/RF negative  - ESR is 65, but he has anemia, CKD     Posthospitalization course:  -The patient continued to have pain in hands, right  shoulder joints of lower extremities  -He has not been on any medication    DM2  The patient has controlled DM 2 with last A1c of 6.7.  On Tradjenta.    COPD  He has h/o COPD.   Denies cough, wheezing, chest tightness or shortness of breath.   No medications.   Former smoker.  CT chest in  did not show emphysema or parenchymal lung disease.    Tobacco use  Quit < 10 yrs ago.  Smokin pack yrs.    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 15 Tab 0   • methimazole (TAPAZOLE) 5 MG Tab Take 1 Tab by mouth every day. 90 Tab 1   • diphenhydrAMINE (BENADRYL) 25 MG Tab Take 50 mg by mouth every bedtime.     • furosemide (LASIX) 40 MG Tab Take 40 mg by mouth as needed.     • acetaminophen (TYLENOL) 500 MG Tab Take 500 mg by mouth every 6 hours as needed for Moderate Pain.     • HYDROcodone-acetaminophen (NORCO) 5-325 MG Tab per tablet Take 1-2 Tabs by mouth every 6 hours as needed.     • carvedilol (COREG) 6.25 MG Tab Take 6.25 mg by mouth 2 times a day, with meals.     • sodium bicarbonate (SODIUM  BICARBONATE) 650 MG Tab Take 650 mg by mouth 2 times a day.     • LANTUS SOLOSTAR 100 UNIT/ML Solution Pen-injector injection 36 Units by Intramuscular route every evening.     • linagliptin (TRADJENTA) 5 MG Tab tablet Take 5 mg by mouth every day.     • pantoprazole (PROTONIX) 40 MG Tablet Delayed Response Take 40 mg by mouth every day.     • hydrOXYzine HCl (ATARAX) 50 MG Tab Take 50 mg by mouth every 6 hours as needed for Itching.     • aspirin (ASA) 81 MG CHEW Take 81 mg by mouth every day.     • prasugrel (EFFIENT) 10 MG TABS Take 10 mg by mouth every day.       No current facility-administered medications for this visit.      ALLERGIES  Allergies: Patient has no known allergies.  PAST MEDICAL HISTORY  Past Medical History:   Diagnosis Date   • CAD (coronary artery disease)     stents   • Chronic kidney disease (CKD), stage V (HCC)    • Diabetes    • Hypertension    • Osteoarthritis    • Peripheral neuropathy (HCC)      SURGICAL HISTORY  He  has a past surgical history that includes other cardiac surgery; appendectomy; and other orthopedic surgery.  SOCIAL HISTORY  Social History   Substance Use Topics   • Smoking status: Former Smoker     Years: 55.00     Quit date: 2014   • Smokeless tobacco: Never Used   • Alcohol use No     Social History     Social History Narrative   • No narrative on file     FAMILY HISTORY  Family History   Problem Relation Age of Onset   • Heart Disease Mother    • Alcohol/Drug Father    • Thyroid Son    • Hypertension Neg Hx    • Hyperlipidemia Neg Hx      Family Status   Relation Status   • Mo    • Fa    • Son (Not Specified)   • Neg Hx (Not Specified)       ROS   Constitutional: Negative for fever, chills.  HENT: Negative for congestion, sore throat.  Eyes: Negative for blurred vision.   Respiratory: Negative for cough, shortness of breath. And per HPI.  Cardiovascular: Negative for chest pain, palpitations. And per HPI.  Gastrointestinal: Negative for  "heartburn, nausea, abdominal pain.   Genitourinary: Negative for dysuria. And per HPI.  Musculoskeletal: As above.  Skin: Negative for rash and itching.   Neuro: Negative for dizziness, weakness and headaches.   Endo/Heme/Allergies: Does not bruise/bleed easily.   Psychiatric/Behavioral: Negative for depression.    PHYSICAL EXAM   Blood pressure 130/72, pulse 96, temperature 37.1 °C (98.8 °F), height 1.676 m (5' 6\"), weight 80.5 kg (177 lb 7.5 oz), SpO2 98 %. Body mass index is 28.64 kg/m².  General:  NAD, well appearing  HEENT:   NC/AT, PERRLA, EOMI, TMs are clear. Oropharyngeal mucosa is pink,  without lesions;  no cervical / supraclavicular  lymphadenopathy, no thyromegaly.    Cardiovascular: RRR.   No m/r/g. No carotid bruits .      Lungs:   CTAB, no w/r/r, no respiratory distress.  Abdomen: Soft, NT/ND + BS; no suprapubic tenderness; no masses or hepatosplenomegaly.  Extremities:  2+ DP and radial pulses bilaterally.  No c/c/e.   No redness, swelling of any joint in UE/LE.  Skin:  Warm, dry.  No erythema. No rash.   Neurologic: Alert & oriented x 3.  No focal deficits.  Psychiatric:  Affect normal, mood normal, judgment normal.    LABS     Labs are reviewed and discussed with a patient  Lab Results   Component Value Date/Time    CHOLSTRLTOT 141 12/31/2011 10:36 AM    LDL 69 12/31/2011 10:36 AM    HDL 30 (A) 12/31/2011 10:36 AM    TRIGLYCERIDE 212 (H) 12/31/2011 10:36 AM       Lab Results   Component Value Date/Time    SODIUM 134 (L) 06/26/2018 04:17 AM    POTASSIUM 5.2 06/26/2018 04:17 AM    CHLORIDE 108 06/26/2018 04:17 AM    CO2 18 (L) 06/26/2018 04:17 AM    GLUCOSE 250 (H) 06/26/2018 04:17 AM    BUN 57 (H) 06/26/2018 04:17 AM    CREATININE 4.09 (H) 06/26/2018 04:17 AM     Lab Results   Component Value Date/Time    ALKPHOSPHAT 114 (H) 06/25/2018 04:05 PM    ASTSGOT 15 06/25/2018 04:05 PM    ALTSGPT 17 06/25/2018 04:05 PM    TBILIRUBIN 0.6 06/25/2018 04:05 PM      Lab Results   Component Value Date/Time    " HBA1C 6.7 (H) 2018 04:05 PM    HBA1C 5.4 2014 07:00 AM    HBA1C 5.5 2013 03:10 PM     No results found for: TSH  Lab Results   Component Value Date/Time    FREET4 1.37 2018 04:05 PM      Ref. Range 2018    Troponin I  0.00 - 0.04 ng/mL <0.02   BNP  0 - 100 pg/mL 84      Ref. Range 2018    Immunoglobulin A  68 - 408 mg/dL 236   C3 Complement  87.0 - 200.0 mg/dL 143.0   Complement C4 19.0 - 52.0 mg/dL 23.0   Complement Total Hemolytic  60 - 144  Units 214 (H)   t-TG IgA Latest Ref Range: 0 - 3 U/mL 1   Hepatitis A Virus Ab, IgM Latest Ref Range: Negative  Negative   Hep B Surface Antigen Latest Ref Range: Negative  Negative   Hepatitis B Cors Ab,IgM Latest Ref Range: Negative  Negative   Hepatitis C Antibody Latest Ref Range: Negative  Negative   Parvovirus B19  PCR Not Detected  Not Detected   Parvovirus B19 IU/mL PCR Latest Units: IU/mL <100   Parvovirus B19 Log IU/mL PCR Latest Units: log IU/mL <2.0   Parvovirus B19 Source PCR Unknown EDTA Plasma   Rheumatoid Factor -Neph- 0 - 14 IU/mL <10   Ccp Antibodies Latest Ref Range: 0 - 19 Units 3   Antinuclear Antibody None Detected  None Detected     Lab Results   Component Value Date/Time    WBC 5.2 2018 04:17 AM    RBC 3.29 (L) 2018 04:17 AM    HEMOGLOBIN 9.4 (L) 2018 04:17 AM    HEMATOCRIT 29.1 (L) 2018 04:17 AM    MCV 88.4 2018 04:17 AM    MCH 28.6 2018 04:17 AM    MCHC 32.3 (L) 2018 04:17 AM    MPV 9.1 2018 04:17 AM    NEUTSPOLYS 88.90 (H) 2018 04:17 AM    LYMPHOCYTES 7.60 (L) 2018 04:17 AM    MONOCYTES 1.70 2018 04:17 AM    EOSINOPHILS 0.00 2018 04:17 AM    BASOPHILS 0.20 2018 04:17 AM      IMAGING     Reviewed the following imaging from hospitalization:    CXR:  Reviewed image, consistent with radiology: No acute cardiopulmonary findings.    X-ray of the right shoulder  Reviewed image, consistent with radiology:  1. No acute osseous abnormality.  2.  Subacromial enthesophyte predisposing for rotator cuff arthropathy.    X-ray of the left knee  Reviewed image, consistent with radiology  1. No acute osseous abnormality.    X-ray of the left hand  Reviewed image, consistent with radiology  No acute osseous abnormality.    MRI of the shoulder, 1/16/2006  OSSEOUS NARROWING OF THE ROTATOR CUFF OUTLET DUE TO SPURRING INVOLVING   THE ACROMION AND DISTAL CLAVICLE.  LIKELY ASSOCIATED WITH THIS, IS   TENDINOPATHY AND PARTIAL TEAR INVOLVING THE DISTAL SUPRASPINATUS,   PARTICULARLY AT ITS MORE POSTERIOR ASPECTS.  THERE ARE PROBABLY BOTH   BURSAL-SIDED AND, TO A LESSER EXTENT, UNDERSURFACE PARTIAL TEARS WITH NO   FULL-THICKNESS RETRACTED TEAR.      Echocardiography 1/25/14  Left Ventricle  Normal left ventricular size and thickness.  Left ventricular ejection   fraction is 45% to 50%. Grade II diastolic dysfunction is present.   Inferior wall shows mild hypokinesis. Consider definity for better   assessment of wall motion.    CT chest, 3/29/14  1.  Curvilinear densities in the left lower lobe, likely indicating scarring. Small amount residual atelectasis from recent pneumonia could be present. No definite consolidation.  2.  Calcifications in the lower left pleural space consistent with previous pleural space infection or hemothorax. No definite change from previous.  3.  Suspect coronary artery disease.  4.  Multiple low density lesions in the liver without definite change, likely cysts.  5.  Left renal cyst without change.  6.   Gallstones. No change.    ASSESMENT AND PLAN        1. Hospital discharge follow-up  2. Acute renal failure superimposed on stage 4 chronic kidney disease, unspecified acute renal failure type (HCC)  The patient has been stable after the discharge.   Advised to continue nephrology follow-up.    3. Hyperthyroidism  Advised to continue current treatment, methimazole 5 mg twice daily  - TSH+FREE T4  - THYROID PEROXIDASE  (TPO) AB; Future  - US-SOFT  TISSUES OF HEAD - NECK; Future    4. Polyarthritis  - RHEUMATOID ARTHRITIS FACTOR; Future  5. Primary osteoarthritis involving multiple joints  He is given prednisone instead of NSAIDs as he has advanced kidney disease.  She may need local steroid injection.  - predniSONE (DELTASONE) 10 MG Tab; 5 po today then 4 po in am then 3 po in am then 2 po in am then 1 po in am then stop.  Dispense: 15 Tab; Refill: 0  - REFERRAL TO PAIN CLINIC    6. Anemia, chronic disease  Due to CKD, continue EPO, and nephrology follow-up    7. Type 2 diabetes mellitus with diabetic neuropathy, with long-term current use of insulin (HCC)  Needs to come back for review    8. COPD exacerbation (HCC)  No symptoms, no atrophy, will be reviewed    9. Coronary artery disease involving native coronary artery of native heart without angina pectoris  Asymptomatic, continue current treatment    10. Nicotine dependence in remission, unspecified nicotine product type  - REFERRAL TO LUNG CANCER SCREENING PROGRAM  11. Personal history of nicotine dependence   - REFERRAL TO LUNG CANCER SCREENING PROGRAM    12. Encounter to establish care  Reviewed PMH, PSH, FH, SH, ALL, MEDS, HCM/IMM.   Release form for old records: signed    13. Health care maintenance  Pending records    Counseling:   - Smoking:  Nonsmoker    Followup: Return in about 4 weeks (around 8/30/2018) for Short.    All questions are answered.    Time spent 40 minutes face to face, with > 50% spent counseling and coordinating care.  High complexity    Please note that this dictation was created using voice recognition software, and that there might be errors of hal and possibly content.

## 2018-08-02 NOTE — LETTER
[facility]  Patito Edgar M.D.  [address]  [facility] [address]  Fax: [phone]   Authorization for Release/Disclosure of   Protected Health Information   Name: KLARISSA BRADSHAW : 1945 SSN: [id number]   Address: [address] [place] [id number] Phone:    [phone] (home)    I authorize the entity listed below to release/disclose the PHI below to:   [facility]/Patito Edgar M.D. and Patito Edgar M.D.   Provider or Entity Name:  [facility]   Address   City, [facility], Zip:               [address] [place] [address], [place] [address]   Phone:  [phone]      Fax:      [phone]        Reason for request: continuity of care   Information to be released:    [ X ] LAST COLONOSCOPY,  including any PATH REPORT and follow-up  [ X ] LAST FIT/COLOGUARD RESULT [  ] LAST DEXA  [  ] LAST MAMMOGRAM  [  ] LAST PAP  [  ] LAST LABS [  ] RETINA EXAM REPORT  [  ] IMMUNIZATION RECORDS  [  ] Release all info      [  ] Check here and initial the line next to each item to release ALL health information INCLUDING  _____ Care and treatment for drug and / or alcohol abuse  _____ HIV testing, infection status, or AIDS  _____ Genetic Testing    DATES OF SERVICE OR TIME PERIOD TO BE DISCLOSED: _____________  I understand and acknowledge that:  * This Authorization may be revoked at any time by you in writing, except if your health information has already been used or disclosed.  * Your health information that will be used or disclosed as a result of you signing this authorization could be re-disclosed by the recipient. If this occurs, your re-disclosed health information may no longer be protected by State or Federal laws.  * You may refuse to sign this Authorization. Your refusal will not affect your ability to obtain treatment.  * This Authorization becomes effective upon signing and will  on (date) __________.      If no date is indicated, this Authorization will   one (1) year from the signature date.    Name: Luis Ibrahim Kaylavivianapuneet    Signature:   Date:     2018       PLEASE FAX REQUESTED RECORDS BACK TO: [phone]

## 2018-08-04 LAB
RHEUMATOID FACT SERPL-ACNC: <10 IU/ML (ref 0–13.9)
THYROPEROXIDASE AB SERPL-ACNC: 10 IU/ML (ref 0–34)

## 2018-08-10 ENCOUNTER — TELEPHONE (OUTPATIENT)
Dept: HEMATOLOGY ONCOLOGY | Facility: MEDICAL CENTER | Age: 73
End: 2018-08-10

## 2018-08-10 NOTE — TELEPHONE ENCOUNTER
Received referral to lung cancer screening program.  Chart review to assess for lung cancer screening program eligibility.   1. Age 55-77 yrs of age? Yes 72 y.o.  2. 30 pack year hx of smoking, or greater? Yes 1 ppdx 55 yrs= 55 pkyr hx  3. Current smoker or if quit, has pt quit within last 15 yrs?Yes  Quit 1/2014  4. Any signs or symptoms of lung cancer? None noted CAD, COPD  5. Previous history of lung cancer? None noted  6. Chest CT within past 12 mos.? None noted  Patient does meet eligibility criteria. [facility] scheduling notified to schedule the shared decision making visit.

## 2018-08-14 ENCOUNTER — HOSPITAL ENCOUNTER (OUTPATIENT)
Dept: RADIOLOGY | Facility: MEDICAL CENTER | Age: 73
End: 2018-08-14
Attending: INTERNAL MEDICINE
Payer: MEDICARE

## 2018-08-14 ENCOUNTER — TELEPHONE (OUTPATIENT)
Dept: HEMATOLOGY ONCOLOGY | Facility: MEDICAL CENTER | Age: 73
End: 2018-08-14

## 2018-08-14 DIAGNOSIS — E05.90 HYPERTHYROIDISM: ICD-10-CM

## 2018-08-14 DIAGNOSIS — M13.0 POLYARTHROPATHY: ICD-10-CM

## 2018-08-14 PROCEDURE — 76536 US EXAM OF HEAD AND NECK: CPT

## 2018-08-14 PROCEDURE — 73120 X-RAY EXAM OF HAND: CPT | Mod: RT

## 2018-08-14 NOTE — TELEPHONE ENCOUNTER
1st attempt to contact the patient,- left voicemail for patient requesting a return call to schedule their shared decision making appointment.   [facility]

## 2018-08-15 ENCOUNTER — OFFICE VISIT (OUTPATIENT)
Dept: MEDICAL GROUP | Facility: MEDICAL CENTER | Age: 73
End: 2018-08-15
Payer: MEDICARE

## 2018-08-15 VITALS
HEART RATE: 90 BPM | HEIGHT: 66 IN | WEIGHT: 177.69 LBS | OXYGEN SATURATION: 99 % | DIASTOLIC BLOOD PRESSURE: 62 MMHG | SYSTOLIC BLOOD PRESSURE: 130 MMHG | BODY MASS INDEX: 28.56 KG/M2 | TEMPERATURE: 98.3 F

## 2018-08-15 DIAGNOSIS — N18.5 CHRONIC KIDNEY DISEASE (CKD), STAGE V (HCC): ICD-10-CM

## 2018-08-15 DIAGNOSIS — Z00.00 HEALTH CARE MAINTENANCE: ICD-10-CM

## 2018-08-15 DIAGNOSIS — I25.10 CORONARY ARTERY DISEASE INVOLVING NATIVE CORONARY ARTERY OF NATIVE HEART WITHOUT ANGINA PECTORIS: ICD-10-CM

## 2018-08-15 DIAGNOSIS — E11.40 CONTROLLED TYPE 2 DIABETES MELLITUS WITH NEUROPATHY (HCC): ICD-10-CM

## 2018-08-15 DIAGNOSIS — E78.5 DYSLIPIDEMIA: ICD-10-CM

## 2018-08-15 DIAGNOSIS — D63.8 ANEMIA OF CHRONIC DISEASE: ICD-10-CM

## 2018-08-15 DIAGNOSIS — K21.9 GASTROESOPHAGEAL REFLUX DISEASE, ESOPHAGITIS PRESENCE NOT SPECIFIED: ICD-10-CM

## 2018-08-15 DIAGNOSIS — E87.1 HYPONATREMIA: ICD-10-CM

## 2018-08-15 DIAGNOSIS — E05.90 HYPERTHYROIDISM: ICD-10-CM

## 2018-08-15 DIAGNOSIS — I10 ESSENTIAL HYPERTENSION: ICD-10-CM

## 2018-08-15 DIAGNOSIS — Z12.11 COLON CANCER SCREENING: ICD-10-CM

## 2018-08-15 DIAGNOSIS — E04.1 THYROID NODULE: ICD-10-CM

## 2018-08-15 DIAGNOSIS — E08.21 DIABETIC NEPHROPATHY ASSOCIATED WITH DIABETES MELLITUS DUE TO UNDERLYING CONDITION (HCC): ICD-10-CM

## 2018-08-15 DIAGNOSIS — E08.42 DIABETIC POLYNEUROPATHY ASSOCIATED WITH DIABETES MELLITUS DUE TO UNDERLYING CONDITION (HCC): ICD-10-CM

## 2018-08-15 PROBLEM — N17.9 ACUTE RENAL FAILURE SUPERIMPOSED ON STAGE 4 CHRONIC KIDNEY DISEASE (HCC): Status: RESOLVED | Noted: 2018-06-25 | Resolved: 2018-08-15

## 2018-08-15 PROBLEM — E11.9 TYPE 2 DIABETES MELLITUS (HCC): Status: RESOLVED | Noted: 2018-06-25 | Resolved: 2018-08-15

## 2018-08-15 PROBLEM — N18.4 ACUTE RENAL FAILURE SUPERIMPOSED ON STAGE 4 CHRONIC KIDNEY DISEASE (HCC): Status: RESOLVED | Noted: 2018-06-25 | Resolved: 2018-08-15

## 2018-08-15 PROCEDURE — 99214 OFFICE O/P EST MOD 30 MIN: CPT | Performed by: INTERNAL MEDICINE

## 2018-08-15 NOTE — PROGRESS NOTES
CHIEF COMPLIANT:   Chief Complaint   Patient presents with   • Follow-Up   • Diabetes     Luis Sepulveda is a 72 y.o. male here for DM follow up    DIABETES MELLITUS TYPE 2  Controlled with neuropathy and CKD stage V with anemia  Onset/Dg: in his 40'  Diabetes education:  no    Medications:   · Insulin: Lantus 36 U HS  · Tradjenta 5 mg in AMs  · ACE/ARB: no  · Statin: no  · ASA: yes  Compliant with medications: yes  Checking feet daily/wear soft socks/shoes: advised    Diabetes ABCDE TARGETS  · A1c, last:   6.7  · Fingersticks:  intermittent  · Mean BS:  In 140'  · Hypoglycemia:  no  · No symptoms of hypoglycemia: tremors, hunger, sometimes dizzy  · Blood Pressure, goal < 140/90: yes  · Cholesterol-Lipid Panel: pending  · Dysalbuminuria:  pending  · Vitamin D: no records    Diet: regular  Exercise:  insufficient  Body mass index is 28.68 kg/m².:     DM complications:  · Peripheral neuropathy: C/o numbness or tingling sensation in the feet.  · Retinopathy:      Last eye exam: pending. No evidence of retinopathy.    · Nephropathy:     CKD stage V, HypoNa + anemia of CKD; f/u by nephrology  · CVS:     No CAD symptoms (CP/pressure, exertional dyspnea, edema).    · GI:     No gastropathy sx (nausea/vomiting).    FH of DM: Brother    HYPERTENSION, CAD  Carvedilol 6.25 mg twice daily, Lasix 40 mg as needed, ASA  Taking as prescribed.   He is not measuring blood pressure at home  Denies:  -  headaches, vision problems, tinnitus.                 -  chest pain/pressure, palpitations, irregular heart beats, exertional, dyspnea, peripheral edema.  Low salt diet: yes  Diet / exercise / BMI: as above.   FH of HTN: Negative    LIPID PROFILE / HYPERLIPIDEMIA  No medications.  Pending lipid panel.  Diet / exercise / BMI: as above.   FH: Negative    GERD  On protonix, 40 mg QD; takes medication as prescribed, that controls sx.   Denies:   - heartburn, epigastric pain.   -  nausea, vomiting, or diarrhea  - dysphagia  -  abnormal cough  - blood in the stool or dark tarry stools.  - early satiety  - tobacco use.    Hyperthyroidism, thyroid nodule  Methimazole, 5 mg daily, taking as prescribed  No temperature intolerance. No change in weight,  hair/skin quality, BMs.   No tremors, weakness.  No peripheral swelling.  No mood changes.  FH: son  US thyroid showed large thyroid nodules.    Reviewed PMH, PSH, FH, SH, ALL, IMM, MEDS.     Current medicines (including changes today)  Current Outpatient Prescriptions   Medication Sig Dispense Refill   • methimazole (TAPAZOLE) 5 MG Tab Take 1 Tab by mouth every day. 90 Tab 1   • diphenhydrAMINE (BENADRYL) 25 MG Tab Take 50 mg by mouth every bedtime.     • furosemide (LASIX) 40 MG Tab Take 40 mg by mouth as needed.     • acetaminophen (TYLENOL) 500 MG Tab Take 500 mg by mouth every 6 hours as needed for Moderate Pain.     • HYDROcodone-acetaminophen (NORCO) 5-325 MG Tab per tablet Take 1-2 Tabs by mouth every 6 hours as needed.     • carvedilol (COREG) 6.25 MG Tab Take 6.25 mg by mouth 2 times a day, with meals.     • sodium bicarbonate (SODIUM BICARBONATE) 650 MG Tab Take 650 mg by mouth 2 times a day.     • LANTUS SOLOSTAR 100 UNIT/ML Solution Pen-injector injection 36 Units by Intramuscular route every evening.     • linagliptin (TRADJENTA) 5 MG Tab tablet Take 5 mg by mouth every day.     • pantoprazole (PROTONIX) 40 MG Tablet Delayed Response Take 40 mg by mouth every day.     • hydrOXYzine HCl (ATARAX) 50 MG Tab Take 50 mg by mouth every 6 hours as needed for Itching.     • aspirin (ASA) 81 MG CHEW Take 81 mg by mouth every day.     • prasugrel (EFFIENT) 10 MG TABS Take 10 mg by mouth every day.       No current facility-administered medications for this visit.      Allergies: Patient has no known allergies.  He  has a past medical history of CAD (coronary artery disease); Chronic kidney disease (CKD), stage V (HCC); Diabetes; Hypertension; Osteoarthritis; and Peripheral neuropathy  "([facility]).  He  has a past surgical history that includes other cardiac surgery; appendectomy; and other orthopedic surgery.  Social History   Substance Use Topics   • Smoking status: Former Smoker     Years: 55.00     Quit date: 2014   • Smokeless tobacco: Never Used   • Alcohol use No     Social History     Social History Narrative   • No narrative on file     Family History   Problem Relation Age of Onset   • Heart Disease Mother    • Alcohol/Drug Father    • Thyroid Son    • Diabetes Brother    • Hypertension Neg Hx    • Hyperlipidemia Neg Hx      Family Status   Relation Status   • Mo    • Fa    • Son (Not Specified)   • Bro (Not Specified)   • Neg Hx (Not Specified)       ROS   Constitutional: Negative for fever, chills and weight loss.   HEENT: Negative for blurred vision, sore throat, swollen glands. No hearing loss or vertigo.  Respiratory: Negative for cough, wheezing, shortness of breath.   CVS: Negative for chest pain, palpitations, irregular heart beats, exertional dyspnea.   GI: Negative for heartburn, abdominal pain, nausea, vomiting, change in BMs.   : Negative for dysuria, polyuria.   MS: Negative for myalgias, joint pain.   Neuro: no headaches, numbness, weakness, seizures.   Skin: no skin lesions, rash.  Endocrine: per HPI.     PHYSICAL EXAM   Blood pressure 130/62, pulse 90, temperature 36.8 °C (98.3 °F), height 1.676 m (5' 6\"), weight 80.6 kg (177 lb 11.1 oz), SpO2 99 %. Body mass index is 28.68 kg/m².  Alert, oriented in no acute distress.  Eye contact is good, speech goal directed, affect bright.  HEENT: EOMI, PERRL, conjunctiva non-injected, sclera non-icteric.  Nares patent with no significant congestion or drainage.  Normal pinnae, external auditory canals, TM pearly gray with normal light reflex bilaterally.  Oral mucous membranes pink and moist with no lesions.  Neck supple with no cervical lymphadenopathy, JVD, palpable thyroid nodules or carotid bruits.  Lungs: clear " to auscultation bilaterally with good excursion.  CV: regular rate and rhythm, without murmur, rubs, thrills, or gallops.  Abdomen: soft, non-distended, non-tender with normal bowel sounds. No CVAT, No masses, or organomegaly.  Lower extremities: color normal, vascularity normal, no edema, temperature normal  Musculoskeletal: Normal gait. No obvious muscle weakness or wasting.  Psych: Normal mood and affect. Alert and oriented x3. Judgment and insight is normal.  Neuro:speech normal, mental status intact, cranial nerves 2-12 intact, gait, including heel, toe, and tandem walking normal, muscle tone normal, muscle strength normal, sensation to light touch and pinprick normal, reflexes normal and symmetric  DM foot exam: Decreased sensation in both feet until ankles B/L.     LABS     Results reviewed and discussed with the patient, questions answered.    Last labs:  Lab Results   Component Value Date/Time    CHOLSTRLTOT 141 12/31/2011 10:36 AM    LDL 69 12/31/2011 10:36 AM    HDL 30 (A) 12/31/2011 10:36 AM    TRIGLYCERIDE 212 (H) 12/31/2011 10:36 AM       Lab Results   Component Value Date/Time    SODIUM 134 (L) 06/26/2018 04:17 AM    POTASSIUM 5.2 06/26/2018 04:17 AM    CHLORIDE 108 06/26/2018 04:17 AM    CO2 18 (L) 06/26/2018 04:17 AM    GLUCOSE 250 (H) 06/26/2018 04:17 AM    BUN 57 (H) 06/26/2018 04:17 AM    CREATININE 4.09 (H) 06/26/2018 04:17 AM     Lab Results   Component Value Date/Time    ALKPHOSPHAT 114 (H) 06/25/2018 04:05 PM    ASTSGOT 15 06/25/2018 04:05 PM    ALTSGPT 17 06/25/2018 04:05 PM    TBILIRUBIN 0.6 06/25/2018 04:05 PM      Lab Results   Component Value Date/Time    HBA1C 6.7 (H) 06/25/2018 04:05 PM    HBA1C 5.4 01/24/2014 07:00 AM    HBA1C 5.5 09/19/2013 03:10 PM     No results found for: TSH  Lab Results   Component Value Date/Time    FREET4 1.37 06/25/2018 04:05 PM       Lab Results   Component Value Date/Time    WBC 5.2 06/26/2018 04:17 AM    RBC 3.29 (L) 06/26/2018 04:17 AM    HEMOGLOBIN 9.4  (L) 06/26/2018 04:17 AM    HEMATOCRIT 29.1 (L) 06/26/2018 04:17 AM    MCV 88.4 06/26/2018 04:17 AM    MCH 28.6 06/26/2018 04:17 AM    MCHC 32.3 (L) 06/26/2018 04:17 AM    MPV 9.1 06/26/2018 04:17 AM    NEUTSPOLYS 88.90 (H) 06/26/2018 04:17 AM    LYMPHOCYTES 7.60 (L) 06/26/2018 04:17 AM    MONOCYTES 1.70 06/26/2018 04:17 AM    EOSINOPHILS 0.00 06/26/2018 04:17 AM    BASOPHILS 0.20 06/26/2018 04:17 AM      Imaging     Reviewed and discussed Thyroid ultrasound, 8/14/18:  The thyroid gland is heterogeneous.  Vascularity is normal.  The right lobe of the thyroid gland measures 2.00 cm x 5.79 cm x 2.23 cm.  The left lobe of the thyroid gland measures 1.97 cm x 4.51 cm x 1.84 cm.  The isthmus measures 0.65 cm.  Bilateral solid masses are confirmed.  Upper pole right thyroid lobe mass measured 21 x 13 x 8 mm with moderate vascularity. This is well circumscribed and slightly hypoechoic.  Right interpolar mass is hypervascular measuring 13 x 12 x 9 mm.  Left upper pole mass is solid measuring 6 mm maximally with some adjacent vascularity. Additional smaller masses  Incidentally noted carotid plaque      ASSESMENT AND PLAN     1. 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     Discussed about:    - importance of appropriate diet and exercise.   - hypoglycemic symptoms and precautions.    - long-term complications of uncontrolled DM, (increased risk of CAD, strokes, retinopathy, nephropathy, /can lead to kidney failure, dialysis/, peripheral neuropathy, (can lead to amputation)   - Good DM control can prevent / delay many of these complications.    - Recommended appropriate foot care     - REFERRAL TO OPHTHALMOLOGY  - COMP METABOLIC PANEL; Future  - HEMOGLOBIN A1C; Future  - MICROALBUMIN CREAT RATIO URINE; Future    2. Diabetic polyneuropathy associated with diabetes mellitus due to underlying condition (HCC)  3. Diabetic nephropathy associated with diabetes mellitus  due to underlying condition (HCC)  Diabetes control, came slow down complications.  - COMP METABOLIC PANEL; Future    4. Essential hypertension  Controlled, continue current treatment  - COMP METABOLIC PANEL; Future  5. CAD  On BB, ASA, no statin.    6. Chronic kidney disease (CKD), stage V (HCC)  Stable, continue neurology follow-up  - COMP METABOLIC PANEL; Future    7. Hyponatremia  Mild, probably due to intermittent Lasix intake, continue nephrology follow-up.    8. Anemia of chronic disease  Stable, continue nephrology follow-up    9. Dyslipidemia  Pending labs, advised low calorie diet, daily exercise  - COMP METABOLIC PANEL; Future  - LIPID PROFILE; Future    10. Gastroesophageal reflux disease, esophagitis presence not specified  Controlled, continue with current Tx    11. Hyperthyroidism  Diagnosed recently, 2 months ago in the ER.   Continue low dose of methimazole.  - REFERRAL TO ENDOCRINOLOGY  - TSH; Future  - FREE THYROXINE; Future  - THYROID PEROXIDASE  (TPO) AB; Future  - IR-NEEDLE BX-SOFT TISSUE NECK-CHEST; Future    12. Thyroid nodule  Large nodule, need thyroid bx.    - REFERRAL TO ENDOCRINOLOGY  - TSH; Future  - FREE THYROXINE; Future  - THYROID PEROXIDASE  (TPO) AB; Future  - IR-NEEDLE BX-SOFT TISSUE NECK-CHEST; Future    13. Colon cancer screening  - REFERRAL TO GI FOR COLONOSCOPY    14.  Health care maintenance  As above.    All questions are answered.    Smoking Counseling: Nonsmoker    Time spent 40 minutes face to face, with > 50% spent counseling and coordinating care.  High complexity    Followup: Return in about 3 months (around 11/15/2018).

## 2018-08-23 ENCOUNTER — HOSPITAL ENCOUNTER (OUTPATIENT)
Dept: RADIOLOGY | Facility: MEDICAL CENTER | Age: 73
End: 2018-08-23
Attending: INTERNAL MEDICINE
Payer: MEDICARE

## 2018-08-23 DIAGNOSIS — E05.90 HYPERTHYROIDISM: ICD-10-CM

## 2018-08-23 DIAGNOSIS — E04.1 THYROID NODULE: ICD-10-CM

## 2018-08-23 PROCEDURE — 88112 CYTOPATH CELL ENHANCE TECH: CPT

## 2018-08-23 PROCEDURE — 10022 IR-NEEDLE BX-SOFT TISSUE NECK-CHEST: CPT

## 2018-08-23 PROCEDURE — 700101 HCHG RX REV CODE 250

## 2018-08-23 RX ORDER — LIDOCAINE HYDROCHLORIDE 10 MG/ML
INJECTION, SOLUTION INFILTRATION; PERINEURAL
Status: COMPLETED
Start: 2018-08-23 | End: 2018-08-23

## 2018-08-23 RX ADMIN — LIDOCAINE HYDROCHLORIDE: 10 INJECTION, SOLUTION INFILTRATION; PERINEURAL at 12:45

## 2018-08-23 NOTE — PROGRESS NOTES
"Patient given [facility] \"Preventing the Spread of Infection\" Brochure upon being checked in.     US guided right thyroid nodule fine needle aspiration done by Dr. Mitchell; NON-SEDATION  (no H&P required as this is a NON SEDATION procedure); right anterior aspect of neck access site; procedural RN: Jorge; 1 jar of cytolyt obtained and sent to pathology lab; pt tolerated the procedure well; pt remained stable pre/intra/post procedure; all questions and concerns answered prior to being d/c; patient provided with appropriate education for procedure; pt d/c home.     "

## 2018-08-24 ENCOUNTER — TELEPHONE (OUTPATIENT)
Dept: MEDICAL GROUP | Facility: MEDICAL CENTER | Age: 73
End: 2018-08-24

## 2018-08-25 NOTE — TELEPHONE ENCOUNTER
Please, notify patient that I reviewed biopsy of the thyroid, that showed abnormal cells, but no cancer.   Needs to schedule appointment with endocrinology, order is already given.  Thank you,   Dr. Brasher

## 2018-08-27 NOTE — TELEPHONE ENCOUNTER
Phone Number Called: [phone] (home)     Message: LM for pt regarding results.    Left Message for patient to call back: yes

## 2018-08-27 NOTE — TELEPHONE ENCOUNTER
1. Caller Name: Luis                                         Call Back Number: [phone] (home)       Patient approves a detailed voicemail message: no    Called pt and notified him of biopsy results. Pt understood, told him to call if there are any other questions.

## 2018-09-14 NOTE — TELEPHONE ENCOUNTER
2nd attempt to contact the patient,- left voicemail for patient requesting a return call to schedule their shared decision making appointment.   [facility] Nic[facility] D[facility]

## 2018-09-26 ENCOUNTER — OFFICE VISIT (OUTPATIENT)
Dept: HEMATOLOGY ONCOLOGY | Facility: MEDICAL CENTER | Age: 73
End: 2018-09-26
Payer: MEDICARE

## 2018-09-26 VITALS
OXYGEN SATURATION: 96 % | RESPIRATION RATE: 16 BRPM | BODY MASS INDEX: 29.58 KG/M2 | SYSTOLIC BLOOD PRESSURE: 158 MMHG | WEIGHT: 184.08 LBS | TEMPERATURE: 97.7 F | HEART RATE: 89 BPM | DIASTOLIC BLOOD PRESSURE: 81 MMHG | HEIGHT: 66 IN

## 2018-09-26 DIAGNOSIS — Z87.891 PERSONAL HISTORY OF TOBACCO USE, PRESENTING HAZARDS TO HEALTH: ICD-10-CM

## 2018-09-26 PROCEDURE — G0296 VISIT TO DETERM LDCT ELIG: HCPCS | Performed by: NURSE PRACTITIONER

## 2018-09-26 ASSESSMENT — ENCOUNTER SYMPTOMS
SHORTNESS OF BREATH: 1
SPUTUM PRODUCTION: 1
COUGH: 1
HEMOPTYSIS: 0
WHEEZING: 0
WEIGHT LOSS: 0

## 2018-09-26 ASSESSMENT — PAIN SCALES - GENERAL: PAINLEVEL: 3=SLIGHT PAIN

## 2018-09-26 NOTE — PROGRESS NOTES
Subjective:      Luis Sepulveda is a 72 y.o. male who presents for Lung Cancer Screening Program Prescreen (history of nicotine dependence) for lung cancer screening shared decision making visit.        HPI   Patient seen today for initial lung cancer screening visit. Patient referred by PCP, Dr. Tom Edgar.     The patient meets eligibility criteria including age, smoking history (30+ pack years), if former smoker, quit in the last 15 years, and absence of signs or symptoms of lung cancer.    - Age - 72  - Smoking history - Patient has smoked for 55 years at an average of 1 ppd = 55 pack year smoking history.  - Current smoking status - former smoker, quit 1/2014  - No symptoms of lung cancer and no previous history of lung cancer     No Known Allergies    Current Outpatient Prescriptions on File Prior to Visit   Medication Sig Dispense Refill   • methimazole (TAPAZOLE) 5 MG Tab Take 1 Tab by mouth every day. 90 Tab 1   • furosemide (LASIX) 40 MG Tab Take 40 mg by mouth as needed.     • carvedilol (COREG) 6.25 MG Tab Take 6.25 mg by mouth 2 times a day, with meals.     • sodium bicarbonate (SODIUM BICARBONATE) 650 MG Tab Take 650 mg by mouth 2 times a day.     • LANTUS SOLOSTAR 100 UNIT/ML Solution Pen-injector injection 36 Units by Intramuscular route every evening.     • linagliptin (TRADJENTA) 5 MG Tab tablet Take 5 mg by mouth every day.     • pantoprazole (PROTONIX) 40 MG Tablet Delayed Response Take 40 mg by mouth every day.     • hydrOXYzine HCl (ATARAX) 50 MG Tab Take 50 mg by mouth every 6 hours as needed for Itching.     • aspirin (ASA) 81 MG CHEW Take 81 mg by mouth every day.     • prasugrel (EFFIENT) 10 MG TABS Take 10 mg by mouth every day.     • diphenhydrAMINE (BENADRYL) 25 MG Tab Take 50 mg by mouth every bedtime.     • acetaminophen (TYLENOL) 500 MG Tab Take 500 mg by mouth every 6 hours as needed for Moderate Pain.     • HYDROcodone-acetaminophen (NORCO) 5-325 MG Tab per tablet  "Take 1-2 Tabs by mouth every 6 hours as needed.       No current facility-administered medications on file prior to visit.        Review of Systems   Constitutional: Negative for malaise/fatigue and weight loss (vascillates with renal disease and retention).   Respiratory: Positive for cough (intermittent with CHF), sputum production (clear) and shortness of breath (depends on CHF, Renal function). Negative for hemoptysis and wheezing.    Cardiovascular:        CHF - followed by Dr Burgos   Genitourinary:        Followed by Neph Kareem)          Objective:     /81 (BP Location: Left arm, Patient Position: Sitting, BP Cuff Size: Adult)   Pulse 89   Temp 36.5 °C (97.7 °F) (Temporal)   Resp 16   Ht 1.676 m (5' 5.98\")   Wt 83.5 kg (184 lb 1.4 oz)   SpO2 96%   BMI 29.73 kg/m²      Physical Exam   Constitutional: He is oriented to person, place, and time. He appears well-developed and well-nourished. No distress.   Cardiovascular: Normal rate, regular rhythm and normal heart sounds.  Exam reveals no gallop and no friction rub.    No murmur heard.  Pulmonary/Chest: Effort normal and breath sounds normal. No respiratory distress. He has no wheezes.   Musculoskeletal: Normal range of motion.   Neurological: He is alert and oriented to person, place, and time.   Skin: Skin is warm and dry. He is not diaphoretic.   Vitals reviewed.       Assessment/Plan:     1. Personal history of tobacco use, presenting hazards to health  CT-LUNG CANCER-SCREENING         We conducted a shared decision-making process using a decision aid. We reviewed benefits and harms of screening, including false positives and potential need for additional diagnostic testing, the possibility of over diagnosis, and total radiation exposure.    We discussed the importance of adhering to annual LDCT screening. We also discussed the impact of comorbities on the patient's the ability or willingness to undergo diagnostic procedure(s) and " treatment.    Counseling on the importance of maintaining cigarette smoking abstinence if former smoker; or the importance of smoking cessation if current smoker and, if appropriate, furnishing of information about tobacco cessation interventions.    Based on our discussion, we have decided to begin annual lung cancer screening starting now.

## 2018-10-05 ENCOUNTER — APPOINTMENT (OUTPATIENT)
Dept: RADIOLOGY | Facility: MEDICAL CENTER | Age: 73
End: 2018-10-05
Attending: NURSE PRACTITIONER
Payer: MEDICARE

## 2019-01-15 ENCOUNTER — APPOINTMENT (OUTPATIENT)
Dept: MEDICAL GROUP | Facility: MEDICAL CENTER | Age: 74
End: 2019-01-15
Payer: MEDICARE

## 2019-02-26 ENCOUNTER — OFFICE VISIT (OUTPATIENT)
Dept: MEDICAL GROUP | Facility: MEDICAL CENTER | Age: 74
End: 2019-02-26
Payer: MEDICARE

## 2019-02-26 VITALS
TEMPERATURE: 98.1 F | SYSTOLIC BLOOD PRESSURE: 134 MMHG | HEIGHT: 66 IN | HEART RATE: 88 BPM | OXYGEN SATURATION: 99 % | DIASTOLIC BLOOD PRESSURE: 72 MMHG | WEIGHT: 186.95 LBS | BODY MASS INDEX: 30.05 KG/M2

## 2019-02-26 DIAGNOSIS — D63.8 ANEMIA OF CHRONIC DISEASE: ICD-10-CM

## 2019-02-26 DIAGNOSIS — I10 ESSENTIAL HYPERTENSION: ICD-10-CM

## 2019-02-26 DIAGNOSIS — N18.5 TYPE 2 DIABETES MELLITUS WITH STAGE 5 CHRONIC KIDNEY DISEASE NOT ON CHRONIC DIALYSIS, WITH LONG-TERM CURRENT USE OF INSULIN (HCC): ICD-10-CM

## 2019-02-26 DIAGNOSIS — N18.5 CHRONIC KIDNEY DISEASE (CKD), STAGE V (HCC): ICD-10-CM

## 2019-02-26 DIAGNOSIS — E66.9 OBESITY (BMI 30.0-34.9): ICD-10-CM

## 2019-02-26 DIAGNOSIS — I25.10 CORONARY ARTERY DISEASE INVOLVING NATIVE CORONARY ARTERY OF NATIVE HEART WITHOUT ANGINA PECTORIS: ICD-10-CM

## 2019-02-26 DIAGNOSIS — E11.22 TYPE 2 DIABETES MELLITUS WITH STAGE 5 CHRONIC KIDNEY DISEASE NOT ON CHRONIC DIALYSIS, WITH LONG-TERM CURRENT USE OF INSULIN (HCC): ICD-10-CM

## 2019-02-26 DIAGNOSIS — E05.90 HYPERTHYROIDISM: ICD-10-CM

## 2019-02-26 DIAGNOSIS — E11.42 DIABETIC PERIPHERAL NEUROPATHY ASSOCIATED WITH TYPE 2 DIABETES MELLITUS (HCC): ICD-10-CM

## 2019-02-26 DIAGNOSIS — E04.1 THYROID NODULE: ICD-10-CM

## 2019-02-26 DIAGNOSIS — Z79.4 TYPE 2 DIABETES MELLITUS WITH STAGE 5 CHRONIC KIDNEY DISEASE NOT ON CHRONIC DIALYSIS, WITH LONG-TERM CURRENT USE OF INSULIN (HCC): ICD-10-CM

## 2019-02-26 DIAGNOSIS — Z00.00 HEALTH CARE MAINTENANCE: ICD-10-CM

## 2019-02-26 PROBLEM — E87.1 HYPONATREMIA: Status: RESOLVED | Noted: 2018-06-25 | Resolved: 2019-02-26

## 2019-02-26 LAB
HBA1C MFR BLD: 6.2 % (ref ?–5.8)
INT CON NEG: NORMAL
INT CON POS: NORMAL

## 2019-02-26 PROCEDURE — 99214 OFFICE O/P EST MOD 30 MIN: CPT | Performed by: INTERNAL MEDICINE

## 2019-02-26 PROCEDURE — 83036 HEMOGLOBIN GLYCOSYLATED A1C: CPT | Performed by: INTERNAL MEDICINE

## 2019-02-26 NOTE — PROGRESS NOTES
CHIEF COMPLAINT  Chief Complaint   Patient presents with   • Diabetes   With DM RN    HPI  Patient is a 73 y.o. male patient who presents today for the following     DM 2 Controlled with neuropathy and CKD stage V / anemia of chronic disease  Onset/Dg: in his 40'  Diabetes education: no     Medications:   • Insulin: Lantus 36 U HS  • Tradjenta 5 mg in AMs  • ACE/ARB: no  • Statin: no  • ASA: yes  Compliant with medications: yes  Checking feet daily/wear soft socks/shoes: advised     Diabetes ABCDE TARGETS  • A1c, last: 6.7  • Fingersticks: intermittent  • Mean BS: In 140'  • Hypoglycemia: no  o No symptoms of hypoglycemia: tremors, hunger, sometimes dizzy  • Blood Pressure, goal < 140/90: yes  • Cholesterol-Lipid Panel: pending  • Dysalbuminuria: pending  • Vitamin D: no records     Diet: regular  Exercise: insufficient  BMI: 30     DM complications:  • Peripheral neuropathy:  C/o numbness or tingling sensation in the feet.  • Retinopathy:                          Last eye exam: pending. No evidence of retinopathy.   • Nephropathy:                         CKD stage V, HypoNa + anemia of CKD; f/u by nephrology  • CVS:                                        Has CAD  • GI:                                           No gastropathy sx (nausea/vomiting).     FH of DM: Brother     Hypertension, CAD  Carvedilol 6.25 mg twice daily, Lasix 40 mg as needed, ASA  Taking as prescribed.   He is not measuring blood pressure at home  Denies: - headaches, vision problems, tinnitus.  - chest pain/pressure, palpitations, irregular heart beats, exertional, dyspnea, peripheral edema.  Low salt diet: yes  Diet / exercise / BMI: as above.   FH of HTN: Negative     Dyslipidemia  No medications.  Pending lipid panel.  Diet / exercise / BMI: as above.   FH: Negative     Hyperthyroidism, thyroid nodule  Methimazole, 5 mg daily, taking as prescribed  No temperature intolerance. No change in weight, hair/skin quality, BMs.   No tremors,  weakness.  No peripheral swelling.  No mood changes.  FH: son  US thyroid showed large thyroid nodules.  Referred to endocrinology, ordered FNA.    Reviewed PMH, PSH, FH, SH, ALL, HCM/IMM, no changes  Reviewed MEDS, no changes    Patient Active Problem List    Diagnosis Date Noted   • Polyarthritis 06/25/2018     Priority: High   • Hypovolemic hyponatremia 06/25/2018     Priority: Medium   • Hyperthyroidism 06/25/2018     Priority: Medium   • Type 2 diabetes mellitus, with long-term current use of insulin (HCC) 06/25/2018     Priority: Low   • Coronary artery disease involving native coronary artery of native heart without angina pectoris 06/25/2018     Priority: Low   • Essential hypertension 08/15/2018   • Gastroesophageal reflux disease 08/15/2018   • Anemia of chronic disease 08/15/2018   • Health care maintenance 08/15/2018   • Hepatic cyst 08/02/2018   • Renal cyst 08/02/2018   • Pulmonary edema 01/24/2014   • COPD exacerbation (HCC) 01/24/2014     CURRENT MEDICATIONS  Current Outpatient Prescriptions   Medication Sig Dispense Refill   • methimazole (TAPAZOLE) 5 MG Tab Take 1 Tab by mouth every day. 90 Tab 1   • furosemide (LASIX) 40 MG Tab Take 40 mg by mouth as needed.     • carvedilol (COREG) 6.25 MG Tab Take 6.25 mg by mouth 2 times a day, with meals.     • sodium bicarbonate (SODIUM BICARBONATE) 650 MG Tab Take 650 mg by mouth 2 times a day.     • LANTUS SOLOSTAR 100 UNIT/ML Solution Pen-injector injection 36 Units by Intramuscular route every evening.     • pantoprazole (PROTONIX) 40 MG Tablet Delayed Response Take 40 mg by mouth every day.     • aspirin (ASA) 81 MG CHEW Take 81 mg by mouth every day.     • prasugrel (EFFIENT) 10 MG TABS Take 10 mg by mouth every day.     • diphenhydrAMINE (BENADRYL) 25 MG Tab Take 50 mg by mouth every bedtime.     • acetaminophen (TYLENOL) 500 MG Tab Take 500 mg by mouth every 6 hours as needed for Moderate Pain.     • HYDROcodone-acetaminophen (NORCO) 5-325 MG Tab per  tablet Take 1-2 Tabs by mouth every 6 hours as needed.     • linagliptin (TRADJENTA) 5 MG Tab tablet Take 5 mg by mouth every day.     • hydrOXYzine HCl (ATARAX) 50 MG Tab Take 50 mg by mouth every 6 hours as needed for Itching.       No current facility-administered medications for this visit.      ALLERGIES  Allergies: Patient has no known allergies.  PAST MEDICAL HISTORY  Past Medical History:   Diagnosis Date   • CAD (coronary artery disease)     stents   • Chronic kidney disease (CKD), stage V (HCC)    • Diabetes    • Hypertension    • Osteoarthritis    • Peripheral neuropathy (HCC)      SURGICAL HISTORY  He  has a past surgical history that includes other cardiac surgery; appendectomy; and other orthopedic surgery.  SOCIAL HISTORY  Social History   Substance Use Topics   • Smoking status: Former Smoker     Packs/day: 1.00     Years: 55.00     Quit date: 2014   • Smokeless tobacco: Never Used   • Alcohol use No     Social History     Social History Narrative   • No narrative on file     FAMILY HISTORY  Family History   Problem Relation Age of Onset   • Heart Disease Mother    • Alcohol/Drug Father    • Thyroid Son    • Diabetes Brother    • Hypertension Neg Hx    • Hyperlipidemia Neg Hx      Family Status   Relation Status   • Mo    • Fa    • Son (Not Specified)   • Bro (Not Specified)   • Neg Hx (Not Specified)     ROS   Constitutional: Negative for fatigue.  HENT: Negative for sore throat.  Eyes: Negative for blurred vision.   Respiratory: Negative for cough, shortness of breath.  Cardiovascular: Negative for chest pain, palpitations. And per HPI.  Gastrointestinal: Negative for heartburn, nausea, abdominal pain.   Genitourinary: Negative for dysuria. And per HPI.  Musculoskeletal: Negative for significant myalgias, back pain and joint pain.   Skin: Negative for rash and itching.   Neuro: Negative for dizziness, headaches.   Endo/Heme/Allergies: Does not bruise/bleed easily. And per  "HPI.  Psychiatric/Behavioral: Negative for depression.    PHYSICAL EXAM   Pulse 88, temperature 36.7 °C (98.1 °F), height 1.676 m (5' 6\"), weight 84.8 kg (186 lb 15.2 oz), SpO2 99 %. Body mass index is 30.17 kg/m².  General:  NAD, well appearing  HEENT:   NC/AT, PERRLA, EOMI, TMs are clear. Oropharyngeal mucosa is pink,  without lesions;  no cervical / supraclavicular  lymphadenopathy, no thyromegaly.    Cardiovascular: RRR.   No m/r/g. No carotid bruits .      Lungs:   CTAB, no w/r/r, no respiratory distress.  Abdomen: Soft, NT/ND..  Extremities:  2+ DP and radial pulses bilaterally.  No c/c/e.   Skin:  Warm, dry.  No erythema. No rash.   Neurologic: Alert & oriented x 3. CN II-XII grossly intact.  No focal deficits.  Psychiatric:  Affect normal, mood normal, judgment normal.    LABS     Labs are reviewed and discussed with a patient  Lab Results   Component Value Date/Time    CHOLSTRLTOT 141 12/31/2011 10:36 AM    LDL 69 12/31/2011 10:36 AM    HDL 30 (A) 12/31/2011 10:36 AM    TRIGLYCERIDE 212 (H) 12/31/2011 10:36 AM       Lab Results   Component Value Date/Time    SODIUM 134 (L) 06/26/2018 04:17 AM    POTASSIUM 5.2 06/26/2018 04:17 AM    CHLORIDE 108 06/26/2018 04:17 AM    CO2 18 (L) 06/26/2018 04:17 AM    GLUCOSE 250 (H) 06/26/2018 04:17 AM    BUN 57 (H) 06/26/2018 04:17 AM    CREATININE 4.09 (H) 06/26/2018 04:17 AM     Lab Results   Component Value Date/Time    ALKPHOSPHAT 114 (H) 06/25/2018 04:05 PM    ASTSGOT 15 06/25/2018 04:05 PM    ALTSGPT 17 06/25/2018 04:05 PM    TBILIRUBIN 0.6 06/25/2018 04:05 PM      Lab Results   Component Value Date/Time    HBA1C 6.7 (H) 06/25/2018 04:05 PM    HBA1C 5.4 01/24/2014 07:00 AM    HBA1C 5.5 09/19/2013 03:10 PM      Ref. Range 6/25/2018   TSH  0.380 - 5.330 uIU/mL 0.010 (L)   Thyroxine -T4 4.0 - 12.0 ug/dL 9.7   Free T-4  0.58 - 1.64 ng/dL 1.37   T3,Free 2.40 - 4.20 pg/mL 3.54     Lab Results   Component Value Date/Time    WBC 5.2 06/26/2018 04:17 AM    RBC 3.29 (L) " 06/26/2018 04:17 AM    HEMOGLOBIN 9.4 (L) 06/26/2018 04:17 AM    HEMATOCRIT 29.1 (L) 06/26/2018 04:17 AM    MCV 88.4 06/26/2018 04:17 AM    MCH 28.6 06/26/2018 04:17 AM    MCHC 32.3 (L) 06/26/2018 04:17 AM    MPV 9.1 06/26/2018 04:17 AM    NEUTSPOLYS 88.90 (H) 06/26/2018 04:17 AM    LYMPHOCYTES 7.60 (L) 06/26/2018 04:17 AM    MONOCYTES 1.70 06/26/2018 04:17 AM    EOSINOPHILS 0.00 06/26/2018 04:17 AM    BASOPHILS 0.20 06/26/2018 04:17 AM      IMAGING     Reviewed and discussed last thyroid ultrasound from 8/14/18  Bilateral solid thyroid gland masses, most suspicious are in the right lobe measuring 21 mm and 13 mm. These would be amenable to ultrasound-guided biopsy    ASSESMENT AND PLAN        1. Type 2 diabetes mellitus with stage 5 chronic kidney disease not on chronic dialysis, with long-term current use of insulin (HCC)    Discussed and educated on:   - All medications, side effects and compliance (discussed carefully)  - Annual eye examinations at Ophthalmology  - Diabetic Meal Plan: plate method  - HbA1C: target and what A1C is  - Home glucose monitoring emphasized  - Hypoglycemia: factors causing, signs/symptoms, proper treatment and when to contact HCP  - Testing Blood Glucose: when to test, recording blood sugars, target range for FSBS and when to contact HCP  - Weight control and daily exercise  - Use of Ozempic   Recommended medication changes: start Ozempic 0.25 mg weekly x 4 weeks, then increase to 0.5 mg weekly, decrease Lantus to 30 units, send FSBG in 1 week       - POCT Hemoglobin A1C  - MICROALBUMIN CREAT RATIO URINE; Future  - LIPID PANEL  - Comp Metabolic Panel; Future  - C-PEPTIDE; Future  - HEMOGLOBIN A1C; Future    2. Diabetic peripheral neuropathy associated with type 2 diabetes mellitus (HCC)  As above.    3. Coronary artery disease involving native coronary artery of native heart without angina pectoris  Asymptomatic, continue current treatment    4. Chronic kidney disease (CKD), stage V  (HCC)  5. Anemia of chronic disease  Stable, continue nephrology f/u.    6. Essential hypertension  Controlled, continue with current treatment    7. Hyperthyroidism  Asymptomatic, continue current treatment, pending labs  - TSH; Future  - FREE THYROXINE; Future    8. Thyroid nodule  Reviewed the last ultrasound, will be followed;  -Already given referral to endocrinology and FNA at last office visit on 8/15/18    9. Obesity (BMI 30.0-34.9)  - OBESITY COUNSELING (No Charge): Patient identified as having weight management issue.  Appropriate orders and counseling given.    10. Health care maintenance  Advised immunizations X5    Counseling:   - Smoking:  Nonsmoker    Followup: Return in about 4 weeks with labs    All questions are answered.    Time spent 40 minutes face to face, with > 50% spent counseling and coordinating care.  With DM RN    Please note that this dictation was created using voice recognition software, and that there might be errors of hal and possibly content.

## 2019-02-26 NOTE — PROGRESS NOTES
RN-SAMANTHAE Note    Subjective:     Health changes since last visit/interval Hx: Luis is a new patient to me.  He presents with Type II DM and CKD stage V, treated with Lantus and Tradjenta (none past month).  He has a large Thyroid nodule, biopsy negative    Medications (including changes made today)  Current Outpatient Prescriptions   Medication Sig Dispense Refill   • Semaglutide (OZEMPIC) 0.25 or 0.5 MG/DOSE Solution Pen-injector Inject 0.5 mg as instructed every 7 days. 3 PEN 3   • methimazole (TAPAZOLE) 5 MG Tab Take 1 Tab by mouth every day. 90 Tab 1   • furosemide (LASIX) 40 MG Tab Take 40 mg by mouth as needed.     • carvedilol (COREG) 6.25 MG Tab Take 6.25 mg by mouth 2 times a day, with meals.     • sodium bicarbonate (SODIUM BICARBONATE) 650 MG Tab Take 650 mg by mouth 2 times a day.     • LANTUS SOLOSTAR 100 UNIT/ML Solution Pen-injector injection 36 Units by Intramuscular route every evening.     • pantoprazole (PROTONIX) 40 MG Tablet Delayed Response Take 40 mg by mouth every day.     • aspirin (ASA) 81 MG CHEW Take 81 mg by mouth every day.     • prasugrel (EFFIENT) 10 MG TABS Take 10 mg by mouth every day.     • diphenhydrAMINE (BENADRYL) 25 MG Tab Take 50 mg by mouth every bedtime.     • acetaminophen (TYLENOL) 500 MG Tab Take 500 mg by mouth every 6 hours as needed for Moderate Pain.     • HYDROcodone-acetaminophen (NORCO) 5-325 MG Tab per tablet Take 1-2 Tabs by mouth every 6 hours as needed.     • linagliptin (TRADJENTA) 5 MG Tab tablet Take 5 mg by mouth every day.     • hydrOXYzine HCl (ATARAX) 50 MG Tab Take 50 mg by mouth every 6 hours as needed for Itching.       No current facility-administered medications for this visit.        Taking daily ASA: Yes  Taking above medications as prescribed: no ran out of Tradjenta  SIDE EFFECTS: Patient denies side effects to medications    Exercise: sporadic irregular exercise, <half hour walking weekly  Diet: meals per day on average: 1-2  Patient's body  mass index is 30.17 kg/m². Exercise and nutrition counseling were performed at this visit.      Health Maintenance:   Health Maintenance Due   Topic Date Due   • Annual Wellness Visit  1945   • PFT SCREENING-FEV1 AND FEV/FVC RATIO / SPIROMETRY SHOULD BE PERFORMED ANNUALLY  12/09/1961   • IMM HEP B VACCINE (1 of 3 - Risk 3-dose series) 12/09/1962   • IMM DTaP/Tdap/Td Vaccine (1 - Tdap) 12/09/1962   • COLONOSCOPY  12/09/1993   • IMM ZOSTER VACCINES (1 of 2) 12/09/1993   • IMM PNEUMOCOCCAL 65+ (ADULT) HIGH/HIGHEST RISK SERIES (1 of 2 - PCV13) 12/09/2008   • FASTING LIPID PROFILE  12/31/2012   • URINE ACR / MICROALBUMIN  09/19/2014   • IMM INFLUENZA (1) 09/01/2018   • A1C SCREENING  12/25/2018       Immunizations:   PPSV23: Declined  [id number]: Declined  Tdap: Due  Flu: Declined  Hep B: Declined    DM:   Last A1c:   Lab Results   Component Value Date/Time    HBA1C 6.2 02/26/2019 01:34 PM      A1C GOAL: < 7    Glucose monitoring frequency: daily  180-225  Hypoglycemic episodes: no    Last Retinal Exam: on file and up-to-date  Daily Foot Exam: Yes no problems except toenail fungus  Routine Dental Exams: Yes    Lab Results   Component Value Date/Time    MALBCRT 290 (H) 01/03/2012 08:39 AM    MICROALBUR 22.6 01/03/2012 08:39 AM        ACR Albumin/Creatinine Ratio goal <30     HTN:   Blood pressure goal <140/<80 .   Currently Rx ACE/ARB: No    Dyslipidemia:    Lab Results   Component Value Date/Time    CHOLSTRLTOT 141 12/31/2011 10:36 AM    LDL 69 12/31/2011 10:36 AM    HDL 30 (A) 12/31/2011 10:36 AM    TRIGLYCERIDE 212 (H) 12/31/2011 10:36 AM       Lab Results   Component Value Date/Time    SODIUM 134 (L) 06/26/2018 04:17 AM    POTASSIUM 5.2 06/26/2018 04:17 AM    CHLORIDE 108 06/26/2018 04:17 AM    CO2 18 (L) 06/26/2018 04:17 AM    GLUCOSE 250 (H) 06/26/2018 04:17 AM    BUN 57 (H) 06/26/2018 04:17 AM    CREATININE 4.09 (H) 06/26/2018 04:17 AM     Lab Results   Component Value Date/Time    ALKPHOSPHAT 114 (H)  06/25/2018 04:05 PM    ASTSGOT 15 06/25/2018 04:05 PM    ALTSGPT 17 06/25/2018 04:05 PM    TBILIRUBIN 0.6 06/25/2018 04:05 PM        Currently Rx Statin: No    He  reports that he quit smoking about 5 years ago. He has a 55.00 pack-year smoking history. He has never used smokeless tobacco.    Objective:     Exam:  Monofilament: not done    Plan:     Discussed and educated on:   - All medications, side effects and compliance (discussed carefully)  - Annual eye examinations at Ophthalmology  - Diabetic Meal Plan: plate method  - HbA1C: target and what A1C is  - Home glucose monitoring emphasized  - Hypoglycemia: factors causing, signs/symptoms, proper treatment and when to contact HCP  - Testing Blood Glucose: when to test, recording blood sugars, target range for FSBS and when to contact HCP  - Weight control and daily exercise  - Use of Ozempic     Recommended medication changes: start Ozempic 0.25 mg weekly x 4 weeks, then increase to 0.5 mg weekly, decrease Lantus to 30 units, send FSBG in 1 week

## 2019-02-27 ENCOUNTER — TELEPHONE (OUTPATIENT)
Dept: MEDICAL GROUP | Facility: MEDICAL CENTER | Age: 74
End: 2019-02-27

## 2019-02-27 NOTE — TELEPHONE ENCOUNTER
"Suggested directions from pharmacy:  \"Starting dose at 0.25mg weekly for 4 weeks, then increase to 0.5mg weekly\"  "

## 2019-03-01 NOTE — TELEPHONE ENCOUNTER
Pharmacy would like for you to clarify directions for prescription. (Typically starting at 0.25 mg weekly for 4 weeks, then increase to 0.5 mg weekly.)

## 2019-03-26 ENCOUNTER — APPOINTMENT (OUTPATIENT)
Dept: MEDICAL GROUP | Facility: MEDICAL CENTER | Age: 74
End: 2019-03-26
Payer: MEDICARE

## 2019-04-08 ENCOUNTER — HOSPITAL ENCOUNTER (OUTPATIENT)
Dept: LAB | Facility: MEDICAL CENTER | Age: 74
End: 2019-04-08
Attending: INTERNAL MEDICINE
Payer: MEDICARE

## 2019-04-08 DIAGNOSIS — N18.5 TYPE 2 DIABETES MELLITUS WITH STAGE 5 CHRONIC KIDNEY DISEASE NOT ON CHRONIC DIALYSIS, WITH LONG-TERM CURRENT USE OF INSULIN (HCC): ICD-10-CM

## 2019-04-08 DIAGNOSIS — Z79.4 TYPE 2 DIABETES MELLITUS WITH STAGE 5 CHRONIC KIDNEY DISEASE NOT ON CHRONIC DIALYSIS, WITH LONG-TERM CURRENT USE OF INSULIN (HCC): ICD-10-CM

## 2019-04-08 DIAGNOSIS — E05.90 HYPERTHYROIDISM: ICD-10-CM

## 2019-04-08 DIAGNOSIS — E11.22 TYPE 2 DIABETES MELLITUS WITH STAGE 5 CHRONIC KIDNEY DISEASE NOT ON CHRONIC DIALYSIS, WITH LONG-TERM CURRENT USE OF INSULIN (HCC): ICD-10-CM

## 2019-04-08 LAB
ALBUMIN SERPL BCP-MCNC: 4.5 G/DL (ref 3.2–4.9)
ALBUMIN/GLOB SERPL: 1.5 G/DL
ALP SERPL-CCNC: 108 U/L (ref 30–99)
ALT SERPL-CCNC: 12 U/L (ref 2–50)
ANION GAP SERPL CALC-SCNC: 14 MMOL/L (ref 0–11.9)
AST SERPL-CCNC: 10 U/L (ref 12–45)
BILIRUB SERPL-MCNC: 0.5 MG/DL (ref 0.1–1.5)
BUN SERPL-MCNC: 68 MG/DL (ref 8–22)
CALCIUM SERPL-MCNC: 8.8 MG/DL (ref 8.5–10.5)
CHLORIDE SERPL-SCNC: 107 MMOL/L (ref 96–112)
CHOLEST SERPL-MCNC: 133 MG/DL (ref 100–199)
CO2 SERPL-SCNC: 18 MMOL/L (ref 20–33)
CREAT SERPL-MCNC: 5.54 MG/DL (ref 0.5–1.4)
EST. AVERAGE GLUCOSE BLD GHB EST-MCNC: 134 MG/DL
GLOBULIN SER CALC-MCNC: 3 G/DL (ref 1.9–3.5)
GLUCOSE SERPL-MCNC: 98 MG/DL (ref 65–99)
HBA1C MFR BLD: 6.3 % (ref 0–5.6)
HDLC SERPL-MCNC: 26 MG/DL
LDLC SERPL CALC-MCNC: 61 MG/DL
POTASSIUM SERPL-SCNC: 5.2 MMOL/L (ref 3.6–5.5)
PROT SERPL-MCNC: 7.5 G/DL (ref 6–8.2)
SODIUM SERPL-SCNC: 139 MMOL/L (ref 135–145)
T4 FREE SERPL-MCNC: 0.79 NG/DL (ref 0.53–1.43)
TRIGL SERPL-MCNC: 232 MG/DL (ref 0–149)
TSH SERPL DL<=0.005 MIU/L-ACNC: 2.99 UIU/ML (ref 0.38–5.33)

## 2019-04-08 PROCEDURE — 84439 ASSAY OF FREE THYROXINE: CPT

## 2019-04-08 PROCEDURE — 83036 HEMOGLOBIN GLYCOSYLATED A1C: CPT | Mod: GA

## 2019-04-08 PROCEDURE — 84681 ASSAY OF C-PEPTIDE: CPT

## 2019-04-08 PROCEDURE — 80061 LIPID PANEL: CPT

## 2019-04-08 PROCEDURE — 84443 ASSAY THYROID STIM HORMONE: CPT

## 2019-04-08 PROCEDURE — 80053 COMPREHEN METABOLIC PANEL: CPT

## 2019-04-08 PROCEDURE — 36415 COLL VENOUS BLD VENIPUNCTURE: CPT

## 2019-04-09 ENCOUNTER — TELEPHONE (OUTPATIENT)
Dept: MEDICAL GROUP | Facility: MEDICAL CENTER | Age: 74
End: 2019-04-09

## 2019-04-09 NOTE — TELEPHONE ENCOUNTER
Phone Number Called: [phone] (home)     Message: LM for patient to call as soon as possible. Patient has critical lab values. Per Dr. Brasher he need to contact his nephrologist asap. If unable, he needs to go to the ER.     Left Message for patient to call back: yes    After hours phone call 4/9/2019 at 5.45     reason   Labs     Problem Worse renal function labs     Plan Patient feels at baseline.  He will contact nephrology in am and go to the ER if any new symptom jamshid Brasher MD,  Internal Medicine / Pediatrics, [facility]

## 2019-04-09 NOTE — TELEPHONE ENCOUNTER
1. Caller Name: [facility]                                         Call Back Number: [phone]      Patient approves a detailed voicemail message: N\A    Dr. leroy, Lab called to report critical lab values. please see patients CREATININE lab Results   Results at 5.54    Please advise.     Thank you,     Ting Jaramillo  Medical Assistant

## 2019-04-10 ENCOUNTER — TELEPHONE (OUTPATIENT)
Dept: MEDICAL GROUP | Facility: MEDICAL CENTER | Age: 74
End: 2019-04-10

## 2019-04-10 LAB — C PEPTIDE SERPL-MCNC: 5.3 NG/ML (ref 0.8–3.5)

## 2019-04-10 NOTE — TELEPHONE ENCOUNTER
1. Caller Name: Luis Sepulveda      Call Back Number: [phone] (home)         Patient approves a detailed voicemail message: yes    Patient called and states he talked to his nephrologist and that they are aware of lab results. States that there is no change from last results and that he is stable. As long as he is feeling well she states he is okay. However, they are both aware.      Thank you    Ting Jaramillo  Medical Assistant

## 2019-04-11 ENCOUNTER — OFFICE VISIT (OUTPATIENT)
Dept: MEDICAL GROUP | Facility: MEDICAL CENTER | Age: 74
End: 2019-04-11
Payer: MEDICARE

## 2019-04-11 VITALS
HEART RATE: 100 BPM | SYSTOLIC BLOOD PRESSURE: 132 MMHG | OXYGEN SATURATION: 97 % | HEIGHT: 66 IN | BODY MASS INDEX: 34.26 KG/M2 | WEIGHT: 213.19 LBS | TEMPERATURE: 98.4 F | DIASTOLIC BLOOD PRESSURE: 82 MMHG

## 2019-04-11 DIAGNOSIS — E11.8 CONTROLLED TYPE 2 DIABETES MELLITUS WITH COMPLICATION, WITH LONG-TERM CURRENT USE OF INSULIN (HCC): ICD-10-CM

## 2019-04-11 DIAGNOSIS — E05.90 HYPERTHYROIDISM: ICD-10-CM

## 2019-04-11 DIAGNOSIS — D63.8 ANEMIA OF CHRONIC DISEASE: ICD-10-CM

## 2019-04-11 DIAGNOSIS — E04.1 THYROID NODULE: ICD-10-CM

## 2019-04-11 DIAGNOSIS — I10 ESSENTIAL HYPERTENSION: ICD-10-CM

## 2019-04-11 DIAGNOSIS — Z79.4 CONTROLLED TYPE 2 DIABETES MELLITUS WITH COMPLICATION, WITH LONG-TERM CURRENT USE OF INSULIN (HCC): ICD-10-CM

## 2019-04-11 DIAGNOSIS — L91.8 SKIN TAG: ICD-10-CM

## 2019-04-11 DIAGNOSIS — L82.1 SEBORRHEIC KERATOSIS: ICD-10-CM

## 2019-04-11 DIAGNOSIS — E78.5 DYSLIPIDEMIA: ICD-10-CM

## 2019-04-11 DIAGNOSIS — N18.5 CHRONIC KIDNEY DISEASE (CKD), STAGE V (HCC): ICD-10-CM

## 2019-04-11 DIAGNOSIS — I25.10 CORONARY ARTERY DISEASE INVOLVING NATIVE CORONARY ARTERY OF NATIVE HEART WITHOUT ANGINA PECTORIS: ICD-10-CM

## 2019-04-11 DIAGNOSIS — E11.42 DIABETIC PERIPHERAL NEUROPATHY ASSOCIATED WITH TYPE 2 DIABETES MELLITUS (HCC): ICD-10-CM

## 2019-04-11 PROCEDURE — 17110 DESTRUCTION B9 LES UP TO 14: CPT | Mod: GZ | Performed by: INTERNAL MEDICINE

## 2019-04-11 PROCEDURE — 99214 OFFICE O/P EST MOD 30 MIN: CPT | Mod: 25 | Performed by: INTERNAL MEDICINE

## 2019-04-11 PROCEDURE — 11200 RMVL SKIN TAGS UP TO&INC 15: CPT | Mod: 59,GZ | Performed by: INTERNAL MEDICINE

## 2019-04-11 ASSESSMENT — PATIENT HEALTH QUESTIONNAIRE - PHQ9: CLINICAL INTERPRETATION OF PHQ2 SCORE: 0

## 2019-04-11 NOTE — PROGRESS NOTES
CHIEF COMPLIANT:   Chief Complaint   Patient presents with   • Results       Luis Sepulveda is a 73 y.o. male here for DM follow up    DM 2 Controlled with neuropathy and CKD stage V / anemia of chronic disease  Onset/Dg: in his 40'  Diabetes education: no     Medications:   • Insulin: Lantus 36 U HS  • Tradjenta 5 mg in AMs  • ACE/ARB: no  • Statin: no  • ASA: yes  Compliant with medications: yes  Checking feet daily/wear soft socks/shoes: advised     Diabetes ABCDE TARGETS  • A1c, last: 6.3, previous 6.2  • Fingersticks: intermittent  • Mean BS: In 120 - 140'  • Hypoglycemia: no  o No symptoms of hypoglycemia: tremors, hunger, sometimes dizzy  • Blood Pressure, goal < 140/90: yes  • Cholesterol-Lipid Panel: LDL 61  • Dysalbuminuria: pos  • Vitamin D: no result     Diet: regular  Exercise: insufficient  BMI: 34     DM complications:  • Peripheral neuropathy:           C/o numbness or tingling in feet.  • Retinopathy:      Last eye exam: 9/2018. No retinopathy.   • Nephropathy:     CKD stage V,  anemia of CKD on Epo; f/u by nephrology  • CVS:     Has CAD  • GI:     No gastropathy     FH of DM: Brother     Hypertension, CAD  Carvedilol 6.25 mg twice daily, Lasix 40 mg as needed, ASA  Taking as prescribed.   He is not measuring blood pressure at home  Denies: - headaches, vision problems, tinnitus.  - chest pain/pressure, palpitations, irregular heart beats, exertional, dyspnea, peripheral edema.  Low salt diet: yes  Diet / exercise / BMI: as above.   FH of HTN: Negative     Dyslipidemia  No medications.  Pending lipid panel.  Diet / exercise / BMI: as above.   FH: Negative     Hyperthyroidism, thyroid nodule  Methimazole, 5 mg daily, taking as prescribed  No temperature intolerance. No change in weight, hair/skin quality, BMs.   No tremors, weakness.  No peripheral swelling.  No mood changes.  FH: son  US thyroid showed large thyroid nodules   - negative/benign bx 8/23/18  Referred to endocrinology, did not  schedule OV.    Thyroid US, 8/14/18:  The thyroid gland is heterogeneous.  Vascularity is normal.  The right lobe of the thyroid gland measures 2.00 cm x 5.79 cm x 2.23 cm.  The left lobe of the thyroid gland measures 1.97 cm x 4.51 cm x 1.84 cm.  The isthmus measures 0.65 cm.  Bilateral solid masses are confirmed.  Upper pole right thyroid lobe mass measured 21 x 13 x 8 mm with moderate vascularity. This is well circumscribed and slightly hypoechoic.  Right interpolar mass is hypervascular measuring 13 x 12 x 9 mm.  Left upper pole mass is solid measuring 6 mm maximally with some adjacent vascularity. Additional smaller masses    Skin lesions  He has 1 large skin tag 0.5-1 cm, on the left lower neck;   - multiple lesions consistent with seborrheic keratosis on the neck and trunk.    Reviewed PMH, PSH, FH, SH, ALL, IMM, MEDS.     Current medicines (including changes today)  Current Outpatient Prescriptions   Medication Sig Dispense Refill   • Semaglutide (OZEMPIC) 0.25 or 0.5 MG/DOSE Solution Pen-injector Inject 0.5 mg as instructed every 7 days. 3 PEN 3   • methimazole (TAPAZOLE) 5 MG Tab Take 1 Tab by mouth every day. 90 Tab 1   • diphenhydrAMINE (BENADRYL) 25 MG Tab Take 50 mg by mouth every bedtime.     • furosemide (LASIX) 40 MG Tab Take 40 mg by mouth as needed.     • acetaminophen (TYLENOL) 500 MG Tab Take 500 mg by mouth every 6 hours as needed for Moderate Pain.     • carvedilol (COREG) 6.25 MG Tab Take 6.25 mg by mouth 2 times a day, with meals.     • sodium bicarbonate (SODIUM BICARBONATE) 650 MG Tab Take 650 mg by mouth 2 times a day.     • LANTUS SOLOSTAR 100 UNIT/ML Solution Pen-injector injection 36 Units by Intramuscular route every evening.     • linagliptin (TRADJENTA) 5 MG Tab tablet Take 5 mg by mouth every day.     • pantoprazole (PROTONIX) 40 MG Tablet Delayed Response Take 40 mg by mouth every day.     • hydrOXYzine HCl (ATARAX) 50 MG Tab Take 50 mg by mouth every 6 hours as needed for  Itching.     • aspirin (ASA) 81 MG CHEW Take 81 mg by mouth every day.     • prasugrel (EFFIENT) 10 MG TABS Take 10 mg by mouth every day.     • HYDROcodone-acetaminophen (NORCO) 5-325 MG Tab per tablet Take 1-2 Tabs by mouth every 6 hours as needed.       No current facility-administered medications for this visit.      Allergies: Patient has no known allergies.  He  has a past medical history of CAD (coronary artery disease); Chronic kidney disease (CKD), stage V (HCC); Diabetes; Hypertension; Osteoarthritis; and Peripheral neuropathy.  He  has a past surgical history that includes other cardiac surgery; appendectomy; and other orthopedic surgery.  Social History   Substance Use Topics   • Smoking status: Former Smoker     Packs/day: 1.00     Years: 55.00     Quit date: 2014   • Smokeless tobacco: Never Used   • Alcohol use No     Social History     Social History Narrative   • No narrative on file     Family History   Problem Relation Age of Onset   • Heart Disease Mother    • Alcohol/Drug Father    • Thyroid Son    • Diabetes Brother    • Hypertension Neg Hx    • Hyperlipidemia Neg Hx      Family Status   Relation Status   • Mo    • Fa    • Son (Not Specified)   • Bro (Not Specified)   • Neg Hx (Not Specified)     ROS   Constitutional: Negative for fatigue.   HEENT: Negative for blurred vision, sore throat, swollen glands. No hearing loss or vertigo.  Respiratory: Negative for cough, wheezing, shortness of breath.   CVS: Negative for chest pain, palpitations, irregular heart beats, exertional dyspnea.   GI: Negative for heartburn, abdominal pain, nausea, vomiting, change in BMs.   : Negative for dysuria, polyuria.   MS: Negative for myalgias, joint pain.   Neuro: no headaches, numbness, weakness, seizures.   Skin: as above.  Endocrine: per HPI.     PHYSICAL EXAM   /82 (BP Location: Left arm, Patient Position: Sitting, BP Cuff Size: Adult)   Pulse 100   Temp 36.9 °C (98.4 °F)  "(Temporal)   Ht 1.676 m (5' 6\")   Wt 96.7 kg (213 lb 3 oz)   SpO2 97%  Body mass index is 34.41 kg/m².  Alert, oriented in no acute distress.  Eye contact is good, speech goal directed, affect bright.  HEENT: EOMI, PERRL, conjunctiva non-injected, sclera non-icteric.  Nares patent with no significant congestion or drainage.  Normal pinnae, external auditory canals, TM pearly gray with normal light reflex bilaterally.  Oral mucous membranes pink and moist with no lesions.  Neck supple with no cervical lymphadenopathy, JVD, palpable thyroid nodules or carotid bruits.  Lungs: clear to auscultation bilaterally with good excursion.  CV: regular rate and rhythm, without murmur, rubs, thrills, or gallops.  Abdomen: soft, non-distended, non-tender with normal bowel sounds. No CVAT, unable to palpate liver/spleen due to obesity.  Lower extremities: color normal, vascularity normal, no edema, temperature normal  Musculoskeletal: Normal gait. No obvious muscle weakness or wasting.  Psych: Normal mood and affect. Alert and oriented x3. Judgment and insight is normal.  Skin: Multiple lesions consistent with seborrheic keratosis over the neck and trunk.  Large skin tag on the left lower neck, 0.5-1cm.  Neuro:speech normal, mental status intact, cranial nerves 2-12 intact.      LABS     Results reviewed and discussed with the patient, questions answered.    Last labs:  Lab Results   Component Value Date/Time    CHOLSTRLTOT 133 04/08/2019 11:56 AM    LDL 61 04/08/2019 11:56 AM    HDL 26 (A) 04/08/2019 11:56 AM    TRIGLYCERIDE 232 (H) 04/08/2019 11:56 AM       Lab Results   Component Value Date/Time    SODIUM 139 04/08/2019 11:56 AM    POTASSIUM 5.2 04/08/2019 11:56 AM    CHLORIDE 107 04/08/2019 11:56 AM    CO2 18 (L) 04/08/2019 11:56 AM    GLUCOSE 98 04/08/2019 11:56 AM    BUN 68 (H) 04/08/2019 11:56 AM    CREATININE 5.54 (HH) 04/08/2019 11:56 AM     Lab Results   Component Value Date/Time    ALKPHOSPHAT 108 (H) 04/08/2019 11:56 " AM    ASTSGOT 10 (L) 04/08/2019 11:56 AM    ALTSGPT 12 04/08/2019 11:56 AM    TBILIRUBIN 0.5 04/08/2019 11:56 AM      Lab Results   Component Value Date/Time    HBA1C 6.3 (H) 04/08/2019 11:56 AM    HBA1C 6.2 02/26/2019 01:34 PM    HBA1C 6.7 (H) 06/25/2018 04:05 PM     No results found for: TSH  Lab Results   Component Value Date/Time    FREET4 0.79 04/08/2019 11:56 AM    FREET4 1.37 06/25/2018 04:05 PM     Lab Results   Component Value Date/Time    WBC 5.2 06/26/2018 04:17 AM    RBC 3.29 (L) 06/26/2018 04:17 AM    HEMOGLOBIN 9.4 (L) 06/26/2018 04:17 AM    HEMATOCRIT 29.1 (L) 06/26/2018 04:17 AM    MCV 88.4 06/26/2018 04:17 AM    MCH 28.6 06/26/2018 04:17 AM    MCHC 32.3 (L) 06/26/2018 04:17 AM    MPV 9.1 06/26/2018 04:17 AM    NEUTSPOLYS 88.90 (H) 06/26/2018 04:17 AM    LYMPHOCYTES 7.60 (L) 06/26/2018 04:17 AM    MONOCYTES 1.70 06/26/2018 04:17 AM    EOSINOPHILS 0.00 06/26/2018 04:17 AM    BASOPHILS 0.20 06/26/2018 04:17 AM      Imaging     Reviewed and discussed thyroid US, 8/14/18:  The thyroid gland is heterogeneous.  Vascularity is normal.  The right lobe of the thyroid gland measures 2.00 cm x 5.79 cm x 2.23 cm.  The left lobe of the thyroid gland measures 1.97 cm x 4.51 cm x 1.84 cm.  The isthmus measures 0.65 cm.  Bilateral solid masses are confirmed.  Upper pole right thyroid lobe mass measured 21 x 13 x 8 mm with moderate vascularity. This is well circumscribed and slightly hypoechoic.  Right interpolar mass is hypervascular measuring 13 x 12 x 9 mm.  Left upper pole mass is solid measuring 6 mm maximally with some adjacent vascularity. Additional smaller masses    ASSESMENT AND PLAN     1. Controlled type 2 diabetes mellitus with complication, with long-term current use of insulin (HCC)  Controlled, continue with current treatment    Discussed about:    - importance of appropriate diet and exercise.   - hypoglycemic symptoms and precautions.    - long-term complications of uncontrolled DM, (increased risk  of CAD, strokes, retinopathy, nephropathy, /can lead to kidney failure, dialysis/, peripheral neuropathy, (can lead to amputation)   - Good DM control can prevent / delay many of these complications.    - Recommended appropriate foot care     All questions are answered.    - Comp Metabolic Panel; Future  - HEMOGLOBIN A1C; Future  - MICROALBUMIN CREAT RATIO URINE; Future    2. Diabetic peripheral neuropathy associated with type 2 diabetes mellitus (HCC)  As above    3. Chronic kidney disease (CKD), stage V (HCC)  - Comp Metabolic Panel; Future  4. Anemia of chronic disease  He has had close nephrology follow-up.    5. Essential hypertension  Controlled, continue current treatment  - Comp Metabolic Panel; Future    6. Coronary artery disease involving native coronary artery of native heart without angina pectoris  Asymptomatic, continue current treatment    7. Dyslipidemia  Triglycerides still borderline high, LDL controlled.  - Lipid Profile; Future    8. Hyperthyroidism  Controlled, continue current treatment  - TSH; Future  - FREE THYROXINE; Future  - REFERRAL TO ENDOCRINOLOGY  - US-SOFT TISSUES OF HEAD - NECK; Future    9. Thyroid nodule  Need endocrinology opinion about possible thyroidectomy.  - REFERRAL TO ENDOCRINOLOGY  - US-SOFT TISSUES OF HEAD - NECK; Future    10. Skin tag, 0.5-1cm, no irritation or inflammation/infection  Applied 0.25 cc of lidocaine 1% with epinephrine.  Skin tag removed with scissors.    11. Seborrheic keratosis, no irritation or inflammation/infection  Above the skin level, brown, 1x0.5 cm.  CRYOTHERAPY:  Risks (including, but not limited to: hypo or hyperpigmentation, redness, blister, blood blister, recurrence, need for further treatment, infection, scar) and benefits of cryotherapy discussed. Patient verbally agreed to proceed with treatment. 2 cryotherapy freeze thaw cycles of 10 seconds were applied to 4 lesions on the temples with cryac. Patient tolerated procedure well. Aftercare  instructions given.  - discussed importance of regular sun protection/sunscreen use, SPF 30 or greater with broad spectrum coverage, need for reapplication every  minutes    Smoking Counseling: Nonsmoker    Followup: Return in about 4 months (around 8/11/2019), or if symptoms worsen or fail to improve.

## 2019-08-13 ENCOUNTER — OFFICE VISIT (OUTPATIENT)
Dept: MEDICAL GROUP | Facility: MEDICAL CENTER | Age: 74
End: 2019-08-13
Payer: MEDICARE

## 2019-08-13 VITALS
DIASTOLIC BLOOD PRESSURE: 82 MMHG | SYSTOLIC BLOOD PRESSURE: 132 MMHG | BODY MASS INDEX: 28.47 KG/M2 | HEART RATE: 76 BPM | OXYGEN SATURATION: 96 % | WEIGHT: 170.86 LBS | HEIGHT: 65 IN | TEMPERATURE: 97.7 F | RESPIRATION RATE: 14 BRPM

## 2019-08-13 DIAGNOSIS — E78.5 DYSLIPIDEMIA: ICD-10-CM

## 2019-08-13 DIAGNOSIS — L60.0 INGROWING TOENAIL: ICD-10-CM

## 2019-08-13 DIAGNOSIS — N18.5 CHRONIC KIDNEY DISEASE (CKD), STAGE V (HCC): ICD-10-CM

## 2019-08-13 DIAGNOSIS — I10 ESSENTIAL HYPERTENSION: ICD-10-CM

## 2019-08-13 DIAGNOSIS — Z79.4 CONTROLLED TYPE 2 DIABETES MELLITUS WITH COMPLICATION, WITH LONG-TERM CURRENT USE OF INSULIN (HCC): ICD-10-CM

## 2019-08-13 DIAGNOSIS — E05.90 HYPERTHYROIDISM: ICD-10-CM

## 2019-08-13 DIAGNOSIS — Z00.00 HEALTH CARE MAINTENANCE: ICD-10-CM

## 2019-08-13 DIAGNOSIS — E11.8 CONTROLLED TYPE 2 DIABETES MELLITUS WITH COMPLICATION, WITH LONG-TERM CURRENT USE OF INSULIN (HCC): ICD-10-CM

## 2019-08-13 DIAGNOSIS — I25.10 CORONARY ARTERY DISEASE INVOLVING NATIVE CORONARY ARTERY OF NATIVE HEART WITHOUT ANGINA PECTORIS: ICD-10-CM

## 2019-08-13 DIAGNOSIS — E04.1 THYROID NODULE: ICD-10-CM

## 2019-08-13 PROBLEM — N18.30 CKD (CHRONIC KIDNEY DISEASE), STAGE III: Status: ACTIVE | Noted: 2019-08-13

## 2019-08-13 PROBLEM — E11.9 TYPE 2 DIABETES MELLITUS, WITH LONG-TERM CURRENT USE OF INSULIN (HCC): Status: RESOLVED | Noted: 2018-06-25 | Resolved: 2019-08-13

## 2019-08-13 PROCEDURE — 99214 OFFICE O/P EST MOD 30 MIN: CPT | Performed by: INTERNAL MEDICINE

## 2019-08-13 ASSESSMENT — ENCOUNTER SYMPTOMS: GENERAL WELL-BEING: GOOD

## 2019-08-13 ASSESSMENT — ACTIVITIES OF DAILY LIVING (ADL): BATHING_REQUIRES_ASSISTANCE: 0

## 2019-08-13 ASSESSMENT — PATIENT HEALTH QUESTIONNAIRE - PHQ9: CLINICAL INTERPRETATION OF PHQ2 SCORE: 0

## 2019-08-13 NOTE — PROGRESS NOTES
CHIEF COMPLAINT  Chief Complaint   Patient presents with   • Follow-Up     4 month   Ingrown toenail    HPI  Luis Sepulveda is a 73 y.o. male who presents today for the following         DM 2 controlled with neuropathy and CKD stage V / anemia of chronic disease stage V  Onset/Dg: in his 40'  Diabetes education: no     Medications:   • Insulin: Lantus 36 U HS  • Tradjenta 5 mg in AMs  • ACE/ARB: no  • Statin: no  • ASA: yes  Compliant with medications: yes  Checking feet daily/wear soft socks/shoes: advised     Diabetes ABCDE TARGETS  • A1c, last: 6.3, previous 6.2  • Fingersticks: intermittent  • Mean BS: In 120 - 140'  • Hypoglycemia: no  o No symptoms of hypoglycemia: tremors, hunger, sometimes dizzy  • Blood Pressure, goal < 140/90: yes  • Cholesterol-Lipid Panel: LDL 61  • Dysalbuminuria: pos  • Vitamin D: no result     Diet: regular  Exercise: insufficient  BMI: 28     DM complications:  • Peripheral neuropathy:  C/o numbness or tingling in feet.  • Retinopathy:                          Last eye exam: 9/2018. No retinopathy.   • Nephropathy:                        CKD stage V, anemia of CKD on Epo; f/u by nephrology  • CVS:                                        Has CAD  • GI:                                           No gastropathy     FH of DM: Brother     Hypertension, CAD  Carvedilol 6.25 mg twice daily, Lasix 40 mg as needed, ASA  Taking as prescribed.   He is not measuring blood pressure at home  Denies: - headaches, vision problems, tinnitus.  - chest pain/pressure, palpitations, irregular heart beats, exertional, dyspnea, peripheral edema.  Low salt diet: yes  Diet / exercise / BMI: as above.   FH of HTN: Negative     Dyslipidemia  No medications.  Pending lipid panel.  Diet / exercise / BMI: as above.   FH: Negative     Hyperthyroidism, thyroid nodule  Methimazole, 5 mg daily, taking as prescribed  No temperature intolerance. No change in weight, hair/skin quality, BMs.   No tremors,  weakness.  No peripheral swelling.  No mood changes.  FH: son  US thyroid showed large thyroid nodules              - negative/benign bx 8/23/18  Referred to endocrinology, did not schedule OV.     Thyroid US, 8/14/18:  The thyroid gland is heterogeneous.  Vascularity is normal.  The right lobe of the thyroid gland measures 2.00 cm x 5.79 cm x 2.23 cm.  The left lobe of the thyroid gland measures 1.97 cm x 4.51 cm x 1.84 cm.  The isthmus measures 0.65 cm.  Bilateral solid masses are confirmed.  Upper pole right thyroid lobe mass measured 21 x 13 x 8 mm with moderate vascularity. This is well circumscribed and slightly hypoechoic.  Right interpolar mass is hypervascular measuring 13 x 12 x 9 mm.  Left upper pole mass is solid measuring 6 mm maximally with some adjacent vascularity. Additional smaller masses    Ingrown toenails  74 y/o, M, history of diabetes, complains of ingrown toenails bilaterally, requested podiatry referral.    Reviewed PMH, PSH, FH, SH, ALL, HCM/IMM, no changes  Reviewed MEDS, no changes    Patient Active Problem List    Diagnosis Date Noted   • Polyarthritis 06/25/2018     Priority: High   • Hyperthyroidism 06/25/2018     Priority: Medium   • Coronary artery disease involving native coronary artery of native heart without angina pectoris 06/25/2018     Priority: Low   • Diabetes mellitus type 2, controlled, with complications ([facility]) 08/13/2019   • Chronic kidney disease (CKD), stage V ([facility]) 08/13/2019   • Dyslipidemia 04/11/2019   • Thyroid nodule 04/11/2019   • Essential hypertension 08/15/2018   • Gastroesophageal reflux disease 08/15/2018   • Anemia of chronic disease 08/15/2018   • Health care maintenance 08/15/2018   • Hepatic cyst 08/02/2018   • Renal cyst 08/02/2018     CURRENT MEDICATIONS  Current Outpatient Medications   Medication Sig Dispense Refill   • Semaglutide (OZEMPIC) 0.25 or 0.5 MG/DOSE Solution Pen-injector Inject 0.5 mg as instructed every 7 days. 3 PEN 3   •  diphenhydrAMINE (BENADRYL) 25 MG Tab Take 50 mg by mouth every bedtime.     • furosemide (LASIX) 40 MG Tab Take 40 mg by mouth as needed.     • acetaminophen (TYLENOL) 500 MG Tab Take 500 mg by mouth every 6 hours as needed for Moderate Pain.     • carvedilol (COREG) 6.25 MG Tab Take 6.25 mg by mouth 2 times a day, with meals.     • sodium bicarbonate (SODIUM BICARBONATE) 650 MG Tab Take 650 mg by mouth 2 times a day.     • LANTUS SOLOSTAR 100 UNIT/ML Solution Pen-injector injection 36 Units by Intramuscular route every evening.     • hydrOXYzine HCl (ATARAX) 50 MG Tab Take 50 mg by mouth every 6 hours as needed for Itching.     • aspirin (ASA) 81 MG CHEW Take 81 mg by mouth every day.     • prasugrel (EFFIENT) 10 MG TABS Take 10 mg by mouth every day.     • methimazole (TAPAZOLE) 5 MG Tab Take 1 Tab by mouth every day. (Patient not taking: Reported on 2019) 90 Tab 1   • HYDROcodone-acetaminophen (NORCO) 5-325 MG Tab per tablet Take 1-2 Tabs by mouth every 6 hours as needed.     • linagliptin (TRADJENTA) 5 MG Tab tablet Take 5 mg by mouth every day.     • pantoprazole (PROTONIX) 40 MG Tablet Delayed Response Take 40 mg by mouth every day.       No current facility-administered medications for this visit.      ALLERGIES  Allergies: Patient has no known allergies.  PAST MEDICAL HISTORY  Past Medical History:   Diagnosis Date   • CAD (coronary artery disease)     stents   • Chronic kidney disease (CKD), stage V (HCC)    • Diabetes    • Hypertension    • Osteoarthritis    • Peripheral neuropathy      SURGICAL HISTORY  He  has a past surgical history that includes other cardiac surgery; appendectomy; and other orthopedic surgery.  SOCIAL HISTORY  Social History     Tobacco Use   • Smoking status: Former Smoker     Packs/day: 1.00     Years: 55.00     Pack years: 55.00     Last attempt to quit: 2014     Years since quittin.5   • Smokeless tobacco: Never Used   Substance Use Topics   • Alcohol use: No   •  "Drug use: No     Social History     Social History Narrative   • Not on file     FAMILY HISTORY  Family History   Problem Relation Age of Onset   • Heart Disease Mother    • Alcohol/Drug Father    • Thyroid Son    • Diabetes Brother    • Hypertension Neg Hx    • Hyperlipidemia Neg Hx      Family Status   Relation Name Status   • Mo     • Fa     • Son  (Not Specified)   • Bro  (Not Specified)   • Neg Hx  (Not Specified)       ROS   Constitutional: Negative for fever, chills, fatigue.  HENT: Negative for congestion, sore throat.  Eyes: Negative for vision problems.   Respiratory: Negative for cough, shortness of breath.  Cardiovascular: Negative for chest pain, palpitations.   Gastrointestinal: Negative for heartburn, nausea, abdominal pain.   Genitourinary: Negative for dysuria.  Musculoskeletal: Negative for significant myalgia, back and joint pain.   Skin: Negative for rash.   Neuro: Negative for dizziness, weakness and headaches.   Endo/Heme/Allergies: Does not bruise/bleed easily.   Psychiatric/Behavioral: Negative for depression.    PHYSICAL EXAM   /82   Pulse 76   Temp 36.5 °C (97.7 °F)   Resp 14   Ht 1.651 m (5' 5\")   Wt 77.5 kg (170 lb 13.7 oz)   SpO2 96%   BMI 28.43 kg/m²   General:  NAD, well appearing  HEENT:   NC/AT, PERRLA, EOMI, TMs are clear. Oropharyngeal mucosa is pink,  without lesions;  no cervical / supraclavicular  lymphadenopathy, no thyromegaly.    Cardiovascular: RRR.   No m/r/g.       Lungs:   CTAB, no w/r/r, no respiratory distress.  Abdomen: Soft, NT/ND; no hepatosplenomegaly.  Extremities:  2+ DP and radial pulses bilaterally.  No c/c/e.  Ingrown toenails bilaterally.  Skin:  Warm, dry.  No erythema. No rash.   Neurologic: Alert & oriented x 3. CN II-XII grossly intact. No focal deficits.  Psychiatric:  Affect normal, mood normal, judgment normal.    Labs     Labs are reviewed and discussed with a patient  Lab Results   Component Value Date/Time    CHOLSTRLTOT " 133 04/08/2019 11:56 AM    LDL 61 04/08/2019 11:56 AM    HDL 26 (A) 04/08/2019 11:56 AM    TRIGLYCERIDE 232 (H) 04/08/2019 11:56 AM       Lab Results   Component Value Date/Time    SODIUM 139 04/08/2019 11:56 AM    POTASSIUM 5.2 04/08/2019 11:56 AM    CHLORIDE 107 04/08/2019 11:56 AM    CO2 18 (L) 04/08/2019 11:56 AM    GLUCOSE 98 04/08/2019 11:56 AM    BUN 68 (H) 04/08/2019 11:56 AM    CREATININE 5.54 (HH) 04/08/2019 11:56 AM     Lab Results   Component Value Date/Time    ALKPHOSPHAT 108 (H) 04/08/2019 11:56 AM    ASTSGOT 10 (L) 04/08/2019 11:56 AM    ALTSGPT 12 04/08/2019 11:56 AM    TBILIRUBIN 0.5 04/08/2019 11:56 AM      Lab Results   Component Value Date/Time    HBA1C 6.3 (H) 04/08/2019 11:56 AM    HBA1C 6.2 02/26/2019 01:34 PM    HBA1C 6.7 (H) 06/25/2018 04:05 PM     No results found for: TSH  Lab Results   Component Value Date/Time    FREET4 0.79 04/08/2019 11:56 AM    FREET4 1.37 06/25/2018 04:05 PM       Lab Results   Component Value Date/Time    WBC 5.2 06/26/2018 04:17 AM    RBC 3.29 (L) 06/26/2018 04:17 AM    HEMOGLOBIN 9.4 (L) 06/26/2018 04:17 AM    HEMATOCRIT 29.1 (L) 06/26/2018 04:17 AM    MCV 88.4 06/26/2018 04:17 AM    MCH 28.6 06/26/2018 04:17 AM    MCHC 32.3 (L) 06/26/2018 04:17 AM    MPV 9.1 06/26/2018 04:17 AM    NEUTSPOLYS 88.90 (H) 06/26/2018 04:17 AM    LYMPHOCYTES 7.60 (L) 06/26/2018 04:17 AM    MONOCYTES 1.70 06/26/2018 04:17 AM    EOSINOPHILS 0.00 06/26/2018 04:17 AM    BASOPHILS 0.20 06/26/2018 04:17 AM      Imaging     Reviewed the following imaging:  Thyroid US, 8/14/18:  The thyroid gland is heterogeneous.  Vascularity is normal.  The right lobe of the thyroid gland measures 2.00 cm x 5.79 cm x 2.23 cm.  The left lobe of the thyroid gland measures 1.97 cm x 4.51 cm x 1.84 cm.  The isthmus measures 0.65 cm.  Bilateral solid masses are confirmed.  Upper pole right thyroid lobe mass measured 21 x 13 x 8 mm with moderate vascularity. This is well circumscribed and slightly  hypoechoic.  Right interpolar mass is hypervascular measuring 13 x 12 x 9 mm.  Left upper pole mass is solid measuring 6 mm maximally with some adjacent vascularity. Additional smaller masses    Assessment and Plan     Luis Sepulveda is a 73 y.o. male    1. Controlled type 2 diabetes mellitus with complication, with long-term current use of insulin (HCC)  Pending labs, continue current treatment    2. Chronic kidney disease (CKD), stage V (HCC)  Pending labs, advised to continue good fluid intake, avoid NSAIDs, continue nephrology follow-up    3. Essential hypertension  Controlled, continue current treatment    4. Coronary artery disease involving native coronary artery of native heart without angina pectoris  Asymptomatic, continue current treatment and cardiology follow-up    5. Dyslipidemia  Pending labs, continue current treatment    6. Ingrowing toenail  Refer to podiatry  - REFERRAL TO PODIATRY    7. Hyperthyroidism  8. Thyroid nodule  Pending labs, continue current treatment    9. Health care maintenance  He did annual physical exam outside of our office  Do pneumonia shot.    Counseling:   - Smoking:  Nonsmoker    Followup: Return in about 4 months (around 12/13/2019).    All questions are answered.    Please note that this dictation was created using voice recognition software, and that there might be errors of hal and possibly content.

## 2019-09-10 ENCOUNTER — HOSPITAL ENCOUNTER (OUTPATIENT)
Dept: LAB | Facility: MEDICAL CENTER | Age: 74
End: 2019-09-10
Attending: INTERNAL MEDICINE
Payer: MEDICARE

## 2019-09-10 DIAGNOSIS — E11.8 CONTROLLED TYPE 2 DIABETES MELLITUS WITH COMPLICATION, WITH LONG-TERM CURRENT USE OF INSULIN (HCC): ICD-10-CM

## 2019-09-10 DIAGNOSIS — E05.90 HYPERTHYROIDISM: ICD-10-CM

## 2019-09-10 DIAGNOSIS — Z79.4 CONTROLLED TYPE 2 DIABETES MELLITUS WITH COMPLICATION, WITH LONG-TERM CURRENT USE OF INSULIN (HCC): ICD-10-CM

## 2019-09-10 DIAGNOSIS — N18.5 CHRONIC KIDNEY DISEASE (CKD), STAGE V (HCC): ICD-10-CM

## 2019-09-10 DIAGNOSIS — I10 ESSENTIAL HYPERTENSION: ICD-10-CM

## 2019-09-10 LAB
ALBUMIN SERPL BCP-MCNC: 4.1 G/DL (ref 3.2–4.9)
ALBUMIN/GLOB SERPL: 1.6 G/DL
ALP SERPL-CCNC: 118 U/L (ref 30–99)
ALT SERPL-CCNC: 12 U/L (ref 2–50)
ANION GAP SERPL CALC-SCNC: 12 MMOL/L (ref 0–11.9)
AST SERPL-CCNC: 8 U/L (ref 12–45)
BILIRUB SERPL-MCNC: 0.4 MG/DL (ref 0.1–1.5)
BUN SERPL-MCNC: 88 MG/DL (ref 8–22)
CALCIUM SERPL-MCNC: 8.9 MG/DL (ref 8.5–10.5)
CHLORIDE SERPL-SCNC: 112 MMOL/L (ref 96–112)
CO2 SERPL-SCNC: 16 MMOL/L (ref 20–33)
CREAT SERPL-MCNC: 6.1 MG/DL (ref 0.5–1.4)
CREAT UR-MCNC: 88.8 MG/DL
FASTING STATUS PATIENT QL REPORTED: NORMAL
GLOBULIN SER CALC-MCNC: 2.6 G/DL (ref 1.9–3.5)
GLUCOSE SERPL-MCNC: 176 MG/DL (ref 65–99)
MICROALBUMIN UR-MCNC: 89 MG/DL
MICROALBUMIN/CREAT UR: 1002 MG/G (ref 0–30)
POTASSIUM SERPL-SCNC: 5.2 MMOL/L (ref 3.6–5.5)
PROT SERPL-MCNC: 6.7 G/DL (ref 6–8.2)
SODIUM SERPL-SCNC: 140 MMOL/L (ref 135–145)
T4 FREE SERPL-MCNC: 1.21 NG/DL (ref 0.53–1.43)
TSH SERPL DL<=0.005 MIU/L-ACNC: 0.01 UIU/ML (ref 0.38–5.33)

## 2019-09-10 PROCEDURE — 84443 ASSAY THYROID STIM HORMONE: CPT

## 2019-09-10 PROCEDURE — 80053 COMPREHEN METABOLIC PANEL: CPT

## 2019-09-10 PROCEDURE — 83036 HEMOGLOBIN GLYCOSYLATED A1C: CPT | Mod: GA

## 2019-09-10 PROCEDURE — 84439 ASSAY OF FREE THYROXINE: CPT

## 2019-09-10 PROCEDURE — 82043 UR ALBUMIN QUANTITATIVE: CPT

## 2019-09-10 PROCEDURE — 36415 COLL VENOUS BLD VENIPUNCTURE: CPT

## 2019-09-10 PROCEDURE — 82570 ASSAY OF URINE CREATININE: CPT

## 2019-09-11 LAB
EST. AVERAGE GLUCOSE BLD GHB EST-MCNC: 105 MG/DL
HBA1C MFR BLD: 5.3 % (ref 0–5.6)

## 2019-09-12 ENCOUNTER — APPOINTMENT (OUTPATIENT)
Dept: MEDICAL GROUP | Facility: MEDICAL CENTER | Age: 74
End: 2019-09-12
Payer: MEDICARE

## 2019-11-11 ENCOUNTER — APPOINTMENT (OUTPATIENT)
Dept: RADIOLOGY | Facility: MEDICAL CENTER | Age: 74
End: 2019-11-11
Attending: EMERGENCY MEDICINE
Payer: MEDICARE

## 2019-11-11 ENCOUNTER — HOSPITAL ENCOUNTER (INPATIENT)
Facility: MEDICAL CENTER | Age: 74
LOS: 7 days | DRG: 280 | End: 2019-11-18
Attending: EMERGENCY MEDICINE | Admitting: HOSPITALIST
Payer: MEDICARE

## 2019-11-11 ENCOUNTER — HOSPITAL ENCOUNTER (EMERGENCY)
Facility: MEDICAL CENTER | Age: 74
End: 2019-11-11
Attending: EMERGENCY MEDICINE
Payer: MEDICARE

## 2019-11-11 VITALS
DIASTOLIC BLOOD PRESSURE: 49 MMHG | WEIGHT: 156.09 LBS | SYSTOLIC BLOOD PRESSURE: 134 MMHG | BODY MASS INDEX: 25.09 KG/M2 | OXYGEN SATURATION: 100 % | TEMPERATURE: 98 F | HEART RATE: 107 BPM | RESPIRATION RATE: 32 BRPM | HEIGHT: 66 IN

## 2019-11-11 DIAGNOSIS — N17.9 ACUTE RENAL FAILURE SUPERIMPOSED ON STAGE 5 CHRONIC KIDNEY DISEASE, NOT ON CHRONIC DIALYSIS, UNSPECIFIED ACUTE RENAL FAILURE TYPE (HCC): ICD-10-CM

## 2019-11-11 DIAGNOSIS — N18.6 CKD (CHRONIC KIDNEY DISEASE) STAGE V REQUIRING CHRONIC DIALYSIS (HCC): ICD-10-CM

## 2019-11-11 DIAGNOSIS — R79.89 ELEVATED TROPONIN: ICD-10-CM

## 2019-11-11 DIAGNOSIS — N18.5 ACUTE RENAL FAILURE SUPERIMPOSED ON STAGE 5 CHRONIC KIDNEY DISEASE, NOT ON CHRONIC DIALYSIS, UNSPECIFIED ACUTE RENAL FAILURE TYPE (HCC): ICD-10-CM

## 2019-11-11 DIAGNOSIS — I21.4 NSTEMI (NON-ST ELEVATED MYOCARDIAL INFARCTION) (HCC): ICD-10-CM

## 2019-11-11 DIAGNOSIS — Z99.2 CKD (CHRONIC KIDNEY DISEASE) STAGE V REQUIRING CHRONIC DIALYSIS (HCC): ICD-10-CM

## 2019-11-11 DIAGNOSIS — R07.9 ACUTE CHEST PAIN: ICD-10-CM

## 2019-11-11 DIAGNOSIS — R06.02 SHORTNESS OF BREATH: ICD-10-CM

## 2019-11-11 LAB
ALBUMIN SERPL BCP-MCNC: 3.6 G/DL (ref 3.2–4.9)
ALBUMIN SERPL BCP-MCNC: 3.7 G/DL (ref 3.2–4.9)
ALBUMIN/GLOB SERPL: 1.2 G/DL
ALBUMIN/GLOB SERPL: 1.3 G/DL
ALP SERPL-CCNC: 157 U/L (ref 30–99)
ALP SERPL-CCNC: 179 U/L (ref 30–99)
ALT SERPL-CCNC: 16 U/L (ref 2–50)
ALT SERPL-CCNC: 18 U/L (ref 2–50)
ANION GAP SERPL CALC-SCNC: 18 MMOL/L (ref 0–11.9)
ANION GAP SERPL CALC-SCNC: 19 MMOL/L (ref 0–11.9)
APTT PPP: 31.5 SEC (ref 24.7–36)
AST SERPL-CCNC: 7 U/L (ref 12–45)
AST SERPL-CCNC: 8 U/L (ref 12–45)
BASOPHILS # BLD AUTO: 0.2 % (ref 0–1.8)
BASOPHILS # BLD AUTO: 0.3 % (ref 0–1.8)
BASOPHILS # BLD: 0.02 K/UL (ref 0–0.12)
BASOPHILS # BLD: 0.02 K/UL (ref 0–0.12)
BILIRUB SERPL-MCNC: 0.2 MG/DL (ref 0.1–1.5)
BILIRUB SERPL-MCNC: 0.3 MG/DL (ref 0.1–1.5)
BUN SERPL-MCNC: 125 MG/DL (ref 8–22)
BUN SERPL-MCNC: 125 MG/DL (ref 8–22)
CALCIUM SERPL-MCNC: 8.5 MG/DL (ref 8.5–10.5)
CALCIUM SERPL-MCNC: 8.8 MG/DL (ref 8.4–10.2)
CHLORIDE SERPL-SCNC: 111 MMOL/L (ref 96–112)
CHLORIDE SERPL-SCNC: 116 MMOL/L (ref 96–112)
CO2 SERPL-SCNC: 7 MMOL/L (ref 20–33)
CO2 SERPL-SCNC: 7 MMOL/L (ref 20–33)
CREAT SERPL-MCNC: 6.58 MG/DL (ref 0.5–1.4)
CREAT SERPL-MCNC: 7.23 MG/DL (ref 0.5–1.4)
EKG IMPRESSION: NORMAL
EOSINOPHIL # BLD AUTO: 0.01 K/UL (ref 0–0.51)
EOSINOPHIL # BLD AUTO: 0.04 K/UL (ref 0–0.51)
EOSINOPHIL NFR BLD: 0.1 % (ref 0–6.9)
EOSINOPHIL NFR BLD: 0.5 % (ref 0–6.9)
ERYTHROCYTE [DISTWIDTH] IN BLOOD BY AUTOMATED COUNT: 46.7 FL (ref 35.9–50)
ERYTHROCYTE [DISTWIDTH] IN BLOOD BY AUTOMATED COUNT: 49.2 FL (ref 35.9–50)
GLOBULIN SER CALC-MCNC: 2.8 G/DL (ref 1.9–3.5)
GLOBULIN SER CALC-MCNC: 3 G/DL (ref 1.9–3.5)
GLUCOSE SERPL-MCNC: 142 MG/DL (ref 65–99)
GLUCOSE SERPL-MCNC: 89 MG/DL (ref 65–99)
HCT VFR BLD AUTO: 24.2 % (ref 42–52)
HCT VFR BLD AUTO: 24.6 % (ref 42–52)
HGB BLD-MCNC: 8.1 G/DL (ref 14–18)
HGB BLD-MCNC: 8.4 G/DL (ref 14–18)
IMM GRANULOCYTES # BLD AUTO: 0.04 K/UL (ref 0–0.11)
IMM GRANULOCYTES # BLD AUTO: 0.04 K/UL (ref 0–0.11)
IMM GRANULOCYTES NFR BLD AUTO: 0.5 % (ref 0–0.9)
IMM GRANULOCYTES NFR BLD AUTO: 0.5 % (ref 0–0.9)
LACTATE BLD-SCNC: 1 MMOL/L (ref 0.5–2)
LYMPHOCYTES # BLD AUTO: 0.76 K/UL (ref 1–4.8)
LYMPHOCYTES # BLD AUTO: 0.85 K/UL (ref 1–4.8)
LYMPHOCYTES NFR BLD: 10 % (ref 22–41)
LYMPHOCYTES NFR BLD: 10.1 % (ref 22–41)
MCH RBC QN AUTO: 30.4 PG (ref 27–33)
MCH RBC QN AUTO: 31 PG (ref 27–33)
MCHC RBC AUTO-ENTMCNC: 33.5 G/DL (ref 33.7–35.3)
MCHC RBC AUTO-ENTMCNC: 34.1 G/DL (ref 33.7–35.3)
MCV RBC AUTO: 89.1 FL (ref 81.4–97.8)
MCV RBC AUTO: 92.7 FL (ref 81.4–97.8)
MONOCYTES # BLD AUTO: 0.96 K/UL (ref 0–0.85)
MONOCYTES # BLD AUTO: 1.07 K/UL (ref 0–0.85)
MONOCYTES NFR BLD AUTO: 12.6 % (ref 0–13.4)
MONOCYTES NFR BLD AUTO: 12.7 % (ref 0–13.4)
NEUTROPHILS # BLD AUTO: 5.73 K/UL (ref 1.82–7.42)
NEUTROPHILS # BLD AUTO: 6.49 K/UL (ref 1.82–7.42)
NEUTROPHILS NFR BLD: 75.9 % (ref 44–72)
NEUTROPHILS NFR BLD: 76.6 % (ref 44–72)
NRBC # BLD AUTO: 0 K/UL
NRBC # BLD AUTO: 0 K/UL
NRBC BLD-RTO: 0 /100 WBC
NRBC BLD-RTO: 0 /100 WBC
PLATELET # BLD AUTO: 242 K/UL (ref 164–446)
PLATELET # BLD AUTO: 252 K/UL (ref 164–446)
PMV BLD AUTO: 8.9 FL (ref 9–12.9)
PMV BLD AUTO: 9.1 FL (ref 9–12.9)
POTASSIUM SERPL-SCNC: 3.7 MMOL/L (ref 3.6–5.5)
POTASSIUM SERPL-SCNC: 4.2 MMOL/L (ref 3.6–5.5)
PROT SERPL-MCNC: 6.4 G/DL (ref 6–8.2)
PROT SERPL-MCNC: 6.7 G/DL (ref 6–8.2)
RBC # BLD AUTO: 2.61 M/UL (ref 4.7–6.1)
RBC # BLD AUTO: 2.76 M/UL (ref 4.7–6.1)
SODIUM SERPL-SCNC: 137 MMOL/L (ref 135–145)
SODIUM SERPL-SCNC: 141 MMOL/L (ref 135–145)
TROPONIN T SERPL-MCNC: 781 NG/L (ref 6–19)
TROPONIN T SERPL-MCNC: 829 NG/L (ref 6–19)
WBC # BLD AUTO: 7.6 K/UL (ref 4.8–10.8)
WBC # BLD AUTO: 8.5 K/UL (ref 4.8–10.8)

## 2019-11-11 PROCEDURE — 84484 ASSAY OF TROPONIN QUANT: CPT

## 2019-11-11 PROCEDURE — 87040 BLOOD CULTURE FOR BACTERIA: CPT

## 2019-11-11 PROCEDURE — 700102 HCHG RX REV CODE 250 W/ 637 OVERRIDE(OP): Performed by: EMERGENCY MEDICINE

## 2019-11-11 PROCEDURE — 93005 ELECTROCARDIOGRAM TRACING: CPT | Performed by: EMERGENCY MEDICINE

## 2019-11-11 PROCEDURE — 700102 HCHG RX REV CODE 250 W/ 637 OVERRIDE(OP): Performed by: INTERNAL MEDICINE

## 2019-11-11 PROCEDURE — 94760 N-INVAS EAR/PLS OXIMETRY 1: CPT

## 2019-11-11 PROCEDURE — 700102 HCHG RX REV CODE 250 W/ 637 OVERRIDE(OP): Performed by: STUDENT IN AN ORGANIZED HEALTH CARE EDUCATION/TRAINING PROGRAM

## 2019-11-11 PROCEDURE — A9270 NON-COVERED ITEM OR SERVICE: HCPCS | Performed by: EMERGENCY MEDICINE

## 2019-11-11 PROCEDURE — 80053 COMPREHEN METABOLIC PANEL: CPT

## 2019-11-11 PROCEDURE — 83605 ASSAY OF LACTIC ACID: CPT

## 2019-11-11 PROCEDURE — 36415 COLL VENOUS BLD VENIPUNCTURE: CPT

## 2019-11-11 PROCEDURE — 99284 EMERGENCY DEPT VISIT MOD MDM: CPT

## 2019-11-11 PROCEDURE — 85730 THROMBOPLASTIN TIME PARTIAL: CPT

## 2019-11-11 PROCEDURE — 700111 HCHG RX REV CODE 636 W/ 250 OVERRIDE (IP): Performed by: PHYSICIAN ASSISTANT

## 2019-11-11 PROCEDURE — 85025 COMPLETE CBC W/AUTO DIFF WBC: CPT | Mod: 91

## 2019-11-11 PROCEDURE — 80053 COMPREHEN METABOLIC PANEL: CPT | Mod: 91

## 2019-11-11 PROCEDURE — 700105 HCHG RX REV CODE 258: Performed by: EMERGENCY MEDICINE

## 2019-11-11 PROCEDURE — 96365 THER/PROPH/DIAG IV INF INIT: CPT

## 2019-11-11 PROCEDURE — 93005 ELECTROCARDIOGRAM TRACING: CPT

## 2019-11-11 PROCEDURE — 84484 ASSAY OF TROPONIN QUANT: CPT | Mod: 91

## 2019-11-11 PROCEDURE — 99223 1ST HOSP IP/OBS HIGH 75: CPT | Performed by: INTERNAL MEDICINE

## 2019-11-11 PROCEDURE — 85025 COMPLETE CBC W/AUTO DIFF WBC: CPT

## 2019-11-11 PROCEDURE — 99223 1ST HOSP IP/OBS HIGH 75: CPT | Mod: GC | Performed by: HOSPITALIST

## 2019-11-11 PROCEDURE — A9270 NON-COVERED ITEM OR SERVICE: HCPCS | Performed by: STUDENT IN AN ORGANIZED HEALTH CARE EDUCATION/TRAINING PROGRAM

## 2019-11-11 PROCEDURE — A9270 NON-COVERED ITEM OR SERVICE: HCPCS | Performed by: INTERNAL MEDICINE

## 2019-11-11 PROCEDURE — 770020 HCHG ROOM/CARE - TELE (206)

## 2019-11-11 PROCEDURE — 700111 HCHG RX REV CODE 636 W/ 250 OVERRIDE (IP): Performed by: EMERGENCY MEDICINE

## 2019-11-11 PROCEDURE — 71045 X-RAY EXAM CHEST 1 VIEW: CPT

## 2019-11-11 PROCEDURE — 99285 EMERGENCY DEPT VISIT HI MDM: CPT

## 2019-11-11 RX ORDER — POLYETHYLENE GLYCOL 3350 17 G/17G
1 POWDER, FOR SOLUTION ORAL
Status: DISCONTINUED | OUTPATIENT
Start: 2019-11-11 | End: 2019-11-18 | Stop reason: HOSPADM

## 2019-11-11 RX ORDER — HEPARIN SODIUM 1000 [USP'U]/ML
5000 INJECTION, SOLUTION INTRAVENOUS; SUBCUTANEOUS ONCE
Status: COMPLETED | OUTPATIENT
Start: 2019-11-11 | End: 2019-11-11

## 2019-11-11 RX ORDER — BISACODYL 10 MG
10 SUPPOSITORY, RECTAL RECTAL
Status: DISCONTINUED | OUTPATIENT
Start: 2019-11-11 | End: 2019-11-18 | Stop reason: HOSPADM

## 2019-11-11 RX ORDER — HEPARIN SODIUM 5000 [USP'U]/100ML
INJECTION, SOLUTION INTRAVENOUS CONTINUOUS
Status: DISCONTINUED | OUTPATIENT
Start: 2019-11-11 | End: 2019-11-14

## 2019-11-11 RX ORDER — AMOXICILLIN 250 MG
2 CAPSULE ORAL 2 TIMES DAILY
Status: DISCONTINUED | OUTPATIENT
Start: 2019-11-11 | End: 2019-11-18 | Stop reason: HOSPADM

## 2019-11-11 RX ORDER — INSULIN GLARGINE 100 [IU]/ML
0.2 INJECTION, SOLUTION SUBCUTANEOUS EVERY EVENING
Status: DISCONTINUED | OUTPATIENT
Start: 2019-11-11 | End: 2019-11-18

## 2019-11-11 RX ORDER — ACETAMINOPHEN 325 MG/1
650 TABLET ORAL EVERY 6 HOURS PRN
Status: DISCONTINUED | OUTPATIENT
Start: 2019-11-11 | End: 2019-11-18 | Stop reason: HOSPADM

## 2019-11-11 RX ORDER — SULFAMETHOXAZOLE AND TRIMETHOPRIM 800; 160 MG/1; MG/1
1 TABLET ORAL ONCE
Status: COMPLETED | OUTPATIENT
Start: 2019-11-11 | End: 2019-11-11

## 2019-11-11 RX ORDER — ASPIRIN 81 MG/1
324 TABLET, CHEWABLE ORAL ONCE
Status: COMPLETED | OUTPATIENT
Start: 2019-11-11 | End: 2019-11-11

## 2019-11-11 RX ORDER — HEPARIN SODIUM 1000 [USP'U]/ML
2600 INJECTION, SOLUTION INTRAVENOUS; SUBCUTANEOUS PRN
Status: DISCONTINUED | OUTPATIENT
Start: 2019-11-11 | End: 2019-11-14

## 2019-11-11 RX ORDER — ATORVASTATIN CALCIUM 20 MG/1
20 TABLET, FILM COATED ORAL EVERY EVENING
Status: DISCONTINUED | OUTPATIENT
Start: 2019-11-11 | End: 2019-11-13

## 2019-11-11 RX ADMIN — SENNOSIDES AND DOCUSATE SODIUM 2 TABLET: 8.6; 5 TABLET ORAL at 22:19

## 2019-11-11 RX ADMIN — CEFTRIAXONE SODIUM 2 G: 2 INJECTION, POWDER, FOR SOLUTION INTRAMUSCULAR; INTRAVENOUS at 17:51

## 2019-11-11 RX ADMIN — ATORVASTATIN CALCIUM 20 MG: 20 TABLET, FILM COATED ORAL at 22:17

## 2019-11-11 RX ADMIN — HEPARIN SODIUM 5000 UNITS: 1000 INJECTION, SOLUTION INTRAVENOUS; SUBCUTANEOUS at 23:58

## 2019-11-11 RX ADMIN — SULFAMETHOXAZOLE AND TRIMETHOPRIM 1 TABLET: 800; 160 TABLET ORAL at 17:51

## 2019-11-11 RX ADMIN — HEPARIN SODIUM 1050 UNITS/HR: 5000 INJECTION, SOLUTION INTRAVENOUS at 23:52

## 2019-11-11 RX ADMIN — ASPIRIN 81 MG 324 MG: 81 TABLET ORAL at 22:19

## 2019-11-11 ASSESSMENT — PATIENT HEALTH QUESTIONNAIRE - PHQ9
1. LITTLE INTEREST OR PLEASURE IN DOING THINGS: NOT AT ALL
SUM OF ALL RESPONSES TO PHQ9 QUESTIONS 1 AND 2: 0
2. FEELING DOWN, DEPRESSED, IRRITABLE, OR HOPELESS: NOT AT ALL

## 2019-11-12 ENCOUNTER — APPOINTMENT (OUTPATIENT)
Dept: CARDIOLOGY | Facility: MEDICAL CENTER | Age: 74
DRG: 280 | End: 2019-11-12
Attending: INTERNAL MEDICINE
Payer: MEDICARE

## 2019-11-12 ENCOUNTER — APPOINTMENT (OUTPATIENT)
Dept: RADIOLOGY | Facility: MEDICAL CENTER | Age: 74
DRG: 280 | End: 2019-11-12
Attending: INTERNAL MEDICINE
Payer: MEDICARE

## 2019-11-12 LAB
ALBUMIN SERPL BCP-MCNC: 3.4 G/DL (ref 3.2–4.9)
ALBUMIN/GLOB SERPL: 1.3 G/DL
ALP SERPL-CCNC: 157 U/L (ref 30–99)
ALT SERPL-CCNC: 18 U/L (ref 2–50)
ANION GAP SERPL CALC-SCNC: 15 MMOL/L (ref 0–11.9)
ANION GAP SERPL CALC-SCNC: 17 MMOL/L (ref 0–11.9)
ANION GAP SERPL CALC-SCNC: 19 MMOL/L (ref 0–11.9)
APTT PPP: 77.4 SEC (ref 24.7–36)
APTT PPP: 82.1 SEC (ref 24.7–36)
APTT PPP: 86.2 SEC (ref 24.7–36)
AST SERPL-CCNC: 8 U/L (ref 12–45)
BASOPHILS # BLD AUTO: 0.3 % (ref 0–1.8)
BASOPHILS # BLD: 0.02 K/UL (ref 0–0.12)
BILIRUB SERPL-MCNC: 0.3 MG/DL (ref 0.1–1.5)
BUN SERPL-MCNC: 129 MG/DL (ref 8–22)
BUN SERPL-MCNC: 140 MG/DL (ref 8–22)
BUN SERPL-MCNC: 80 MG/DL (ref 8–22)
CALCIUM SERPL-MCNC: 7.8 MG/DL (ref 8.5–10.5)
CALCIUM SERPL-MCNC: 8.5 MG/DL (ref 8.5–10.5)
CALCIUM SERPL-MCNC: 8.5 MG/DL (ref 8.5–10.5)
CHLORIDE SERPL-SCNC: 108 MMOL/L (ref 96–112)
CHLORIDE SERPL-SCNC: 116 MMOL/L (ref 96–112)
CHLORIDE SERPL-SCNC: 117 MMOL/L (ref 96–112)
CO2 SERPL-SCNC: 19 MMOL/L (ref 20–33)
CO2 SERPL-SCNC: 5 MMOL/L (ref 20–33)
CO2 SERPL-SCNC: 7 MMOL/L (ref 20–33)
CREAT SERPL-MCNC: 4.39 MG/DL (ref 0.5–1.4)
CREAT SERPL-MCNC: 6.53 MG/DL (ref 0.5–1.4)
CREAT SERPL-MCNC: 6.78 MG/DL (ref 0.5–1.4)
EOSINOPHIL # BLD AUTO: 0.03 K/UL (ref 0–0.51)
EOSINOPHIL NFR BLD: 0.4 % (ref 0–6.9)
ERYTHROCYTE [DISTWIDTH] IN BLOOD BY AUTOMATED COUNT: 49.4 FL (ref 35.9–50)
FERRITIN SERPL-MCNC: 197.7 NG/ML (ref 22–322)
GLOBULIN SER CALC-MCNC: 2.6 G/DL (ref 1.9–3.5)
GLUCOSE BLD-MCNC: 70 MG/DL (ref 65–99)
GLUCOSE BLD-MCNC: 78 MG/DL (ref 65–99)
GLUCOSE BLD-MCNC: 83 MG/DL (ref 65–99)
GLUCOSE SERPL-MCNC: 160 MG/DL (ref 65–99)
GLUCOSE SERPL-MCNC: 78 MG/DL (ref 65–99)
GLUCOSE SERPL-MCNC: 81 MG/DL (ref 65–99)
HAV IGM SERPL QL IA: NEGATIVE
HBV CORE IGM SER QL: NEGATIVE
HBV SURFACE AB SERPL IA-ACNC: 3.88 MIU/ML (ref 0–10)
HBV SURFACE AG SER QL: NEGATIVE
HCT VFR BLD AUTO: 23.3 % (ref 42–52)
HCV AB SER QL: NEGATIVE
HGB BLD-MCNC: 7.7 G/DL (ref 14–18)
HGB RETIC QN AUTO: 32.1 PG/CELL (ref 29–35)
IMM GRANULOCYTES # BLD AUTO: 0.06 K/UL (ref 0–0.11)
IMM GRANULOCYTES NFR BLD AUTO: 0.8 % (ref 0–0.9)
IMM RETICS NFR: 26.3 % (ref 9.3–17.4)
INR PPP: 1.44 (ref 0.87–1.13)
IRON SATN MFR SERPL: 80 % (ref 15–55)
IRON SERPL-MCNC: 173 UG/DL (ref 50–180)
LV EJECT FRACT  99904: 50
LV EJECT FRACT MOD 2C 99903: 34.34
LV EJECT FRACT MOD 4C 99902: 50
LV EJECT FRACT MOD BP 99901: 45
LYMPHOCYTES # BLD AUTO: 0.82 K/UL (ref 1–4.8)
LYMPHOCYTES NFR BLD: 10.3 % (ref 22–41)
MCH RBC QN AUTO: 30.8 PG (ref 27–33)
MCHC RBC AUTO-ENTMCNC: 33 G/DL (ref 33.7–35.3)
MCV RBC AUTO: 93.2 FL (ref 81.4–97.8)
MONOCYTES # BLD AUTO: 0.95 K/UL (ref 0–0.85)
MONOCYTES NFR BLD AUTO: 11.9 % (ref 0–13.4)
NEUTROPHILS # BLD AUTO: 6.1 K/UL (ref 1.82–7.42)
NEUTROPHILS NFR BLD: 76.3 % (ref 44–72)
NRBC # BLD AUTO: 0 K/UL
NRBC BLD-RTO: 0 /100 WBC
PHOSPHATE SERPL-MCNC: 8.9 MG/DL (ref 2.5–4.5)
PLATELET # BLD AUTO: 239 K/UL (ref 164–446)
PMV BLD AUTO: 9.1 FL (ref 9–12.9)
POTASSIUM SERPL-SCNC: 3 MMOL/L (ref 3.6–5.5)
POTASSIUM SERPL-SCNC: 4 MMOL/L (ref 3.6–5.5)
POTASSIUM SERPL-SCNC: 4.2 MMOL/L (ref 3.6–5.5)
PROT SERPL-MCNC: 6 G/DL (ref 6–8.2)
PROTHROMBIN TIME: 17.9 SEC (ref 12–14.6)
RBC # BLD AUTO: 2.5 M/UL (ref 4.7–6.1)
RETICS # AUTO: 0.07 M/UL (ref 0.04–0.06)
RETICS/RBC NFR: 3.1 % (ref 0.8–2.1)
SODIUM SERPL-SCNC: 140 MMOL/L (ref 135–145)
SODIUM SERPL-SCNC: 141 MMOL/L (ref 135–145)
SODIUM SERPL-SCNC: 142 MMOL/L (ref 135–145)
TIBC SERPL-MCNC: 217 UG/DL (ref 250–450)
TROPONIN T SERPL-MCNC: 742 NG/L (ref 6–19)
TROPONIN T SERPL-MCNC: 749 NG/L (ref 6–19)
TROPONIN T SERPL-MCNC: 816 NG/L (ref 6–19)
WBC # BLD AUTO: 8 K/UL (ref 4.8–10.8)

## 2019-11-12 PROCEDURE — 86706 HEP B SURFACE ANTIBODY: CPT

## 2019-11-12 PROCEDURE — 85610 PROTHROMBIN TIME: CPT

## 2019-11-12 PROCEDURE — 93306 TTE W/DOPPLER COMPLETE: CPT | Mod: 26 | Performed by: INTERNAL MEDICINE

## 2019-11-12 PROCEDURE — 700102 HCHG RX REV CODE 250 W/ 637 OVERRIDE(OP): Performed by: STUDENT IN AN ORGANIZED HEALTH CARE EDUCATION/TRAINING PROGRAM

## 2019-11-12 PROCEDURE — 99232 SBSQ HOSP IP/OBS MODERATE 35: CPT | Performed by: INTERNAL MEDICINE

## 2019-11-12 PROCEDURE — 93005 ELECTROCARDIOGRAM TRACING: CPT | Performed by: INTERNAL MEDICINE

## 2019-11-12 PROCEDURE — B518ZZA FLUOROSCOPY OF SUPERIOR VENA CAVA, GUIDANCE: ICD-10-PCS | Performed by: RADIOLOGY

## 2019-11-12 PROCEDURE — 99153 MOD SED SAME PHYS/QHP EA: CPT

## 2019-11-12 PROCEDURE — 85046 RETICYTE/HGB CONCENTRATE: CPT

## 2019-11-12 PROCEDURE — A9270 NON-COVERED ITEM OR SERVICE: HCPCS | Performed by: STUDENT IN AN ORGANIZED HEALTH CARE EDUCATION/TRAINING PROGRAM

## 2019-11-12 PROCEDURE — 85730 THROMBOPLASTIN TIME PARTIAL: CPT

## 2019-11-12 PROCEDURE — 85025 COMPLETE CBC W/AUTO DIFF WBC: CPT

## 2019-11-12 PROCEDURE — 700111 HCHG RX REV CODE 636 W/ 250 OVERRIDE (IP): Performed by: RADIOLOGY

## 2019-11-12 PROCEDURE — 83550 IRON BINDING TEST: CPT

## 2019-11-12 PROCEDURE — 84100 ASSAY OF PHOSPHORUS: CPT

## 2019-11-12 PROCEDURE — 700111 HCHG RX REV CODE 636 W/ 250 OVERRIDE (IP)

## 2019-11-12 PROCEDURE — A9270 NON-COVERED ITEM OR SERVICE: HCPCS | Performed by: INTERNAL MEDICINE

## 2019-11-12 PROCEDURE — 83540 ASSAY OF IRON: CPT

## 2019-11-12 PROCEDURE — 700102 HCHG RX REV CODE 250 W/ 637 OVERRIDE(OP): Performed by: INTERNAL MEDICINE

## 2019-11-12 PROCEDURE — 80048 BASIC METABOLIC PNL TOTAL CA: CPT

## 2019-11-12 PROCEDURE — 36415 COLL VENOUS BLD VENIPUNCTURE: CPT

## 2019-11-12 PROCEDURE — B548ZZA ULTRASONOGRAPHY OF SUPERIOR VENA CAVA, GUIDANCE: ICD-10-PCS | Performed by: RADIOLOGY

## 2019-11-12 PROCEDURE — 82728 ASSAY OF FERRITIN: CPT

## 2019-11-12 PROCEDURE — 90935 HEMODIALYSIS ONE EVALUATION: CPT

## 2019-11-12 PROCEDURE — 770020 HCHG ROOM/CARE - TELE (206)

## 2019-11-12 PROCEDURE — 93005 ELECTROCARDIOGRAM TRACING: CPT | Performed by: STUDENT IN AN ORGANIZED HEALTH CARE EDUCATION/TRAINING PROGRAM

## 2019-11-12 PROCEDURE — 5A1D70Z PERFORMANCE OF URINARY FILTRATION, INTERMITTENT, LESS THAN 6 HOURS PER DAY: ICD-10-PCS | Performed by: INTERNAL MEDICINE

## 2019-11-12 PROCEDURE — 80074 ACUTE HEPATITIS PANEL: CPT

## 2019-11-12 PROCEDURE — 02H633Z INSERTION OF INFUSION DEVICE INTO RIGHT ATRIUM, PERCUTANEOUS APPROACH: ICD-10-PCS | Performed by: RADIOLOGY

## 2019-11-12 PROCEDURE — 82962 GLUCOSE BLOOD TEST: CPT

## 2019-11-12 PROCEDURE — 700111 HCHG RX REV CODE 636 W/ 250 OVERRIDE (IP): Performed by: INTERNAL MEDICINE

## 2019-11-12 PROCEDURE — 0JH63XZ INSERTION OF TUNNELED VASCULAR ACCESS DEVICE INTO CHEST SUBCUTANEOUS TISSUE AND FASCIA, PERCUTANEOUS APPROACH: ICD-10-PCS | Performed by: RADIOLOGY

## 2019-11-12 PROCEDURE — 700105 HCHG RX REV CODE 258: Performed by: INTERNAL MEDICINE

## 2019-11-12 PROCEDURE — 99233 SBSQ HOSP IP/OBS HIGH 50: CPT | Mod: GC | Performed by: HOSPITALIST

## 2019-11-12 PROCEDURE — 93005 ELECTROCARDIOGRAM TRACING: CPT | Performed by: HOSPITALIST

## 2019-11-12 PROCEDURE — 93306 TTE W/DOPPLER COMPLETE: CPT

## 2019-11-12 PROCEDURE — 80053 COMPREHEN METABOLIC PANEL: CPT

## 2019-11-12 PROCEDURE — 84484 ASSAY OF TROPONIN QUANT: CPT

## 2019-11-12 RX ORDER — CEFAZOLIN SODIUM 2 G/100ML
2 INJECTION, SOLUTION INTRAVENOUS ONCE
Status: COMPLETED | OUTPATIENT
Start: 2019-11-12 | End: 2019-11-12

## 2019-11-12 RX ORDER — SODIUM BICARBONATE 650 MG/1
650 TABLET ORAL 3 TIMES DAILY
Status: DISCONTINUED | OUTPATIENT
Start: 2019-11-12 | End: 2019-11-14

## 2019-11-12 RX ORDER — SODIUM CHLORIDE 9 MG/ML
500 INJECTION, SOLUTION INTRAVENOUS
Status: ACTIVE | OUTPATIENT
Start: 2019-11-12 | End: 2019-11-12

## 2019-11-12 RX ORDER — NITROGLYCERIN 0.4 MG/1
0.4 TABLET SUBLINGUAL
Status: DISCONTINUED | OUTPATIENT
Start: 2019-11-12 | End: 2019-11-18 | Stop reason: HOSPADM

## 2019-11-12 RX ORDER — MIDAZOLAM HYDROCHLORIDE 1 MG/ML
.5-2 INJECTION INTRAMUSCULAR; INTRAVENOUS PRN
Status: ACTIVE | OUTPATIENT
Start: 2019-11-12 | End: 2019-11-12

## 2019-11-12 RX ORDER — LORAZEPAM 2 MG/ML
1 INJECTION INTRAMUSCULAR ONCE
Status: DISCONTINUED | OUTPATIENT
Start: 2019-11-12 | End: 2019-11-12

## 2019-11-12 RX ORDER — NITROGLYCERIN 0.4 MG/1
0.4 TABLET SUBLINGUAL ONCE
Status: COMPLETED | OUTPATIENT
Start: 2019-11-12 | End: 2019-11-12

## 2019-11-12 RX ORDER — LIDOCAINE HYDROCHLORIDE AND EPINEPHRINE 10; 10 MG/ML; UG/ML
INJECTION, SOLUTION INFILTRATION; PERINEURAL
Status: DISPENSED
Start: 2019-11-12 | End: 2019-11-13

## 2019-11-12 RX ORDER — HEPARIN SODIUM 1000 [USP'U]/ML
INJECTION, SOLUTION INTRAVENOUS; SUBCUTANEOUS
Status: COMPLETED
Start: 2019-11-12 | End: 2019-11-12

## 2019-11-12 RX ORDER — ONDANSETRON 2 MG/ML
4 INJECTION INTRAMUSCULAR; INTRAVENOUS PRN
Status: ACTIVE | OUTPATIENT
Start: 2019-11-12 | End: 2019-11-12

## 2019-11-12 RX ORDER — ALBUMIN (HUMAN) 12.5 G/50ML
25 SOLUTION INTRAVENOUS
Status: DISCONTINUED | OUTPATIENT
Start: 2019-11-12 | End: 2019-11-18 | Stop reason: HOSPADM

## 2019-11-12 RX ORDER — PROCHLORPERAZINE MALEATE 5 MG/1
5 TABLET ORAL ONCE
Status: COMPLETED | OUTPATIENT
Start: 2019-11-12 | End: 2019-11-12

## 2019-11-12 RX ORDER — PRASUGREL 10 MG/1
10 TABLET, FILM COATED ORAL DAILY
Status: DISCONTINUED | OUTPATIENT
Start: 2019-11-12 | End: 2019-11-15

## 2019-11-12 RX ORDER — MIDAZOLAM HYDROCHLORIDE 1 MG/ML
INJECTION INTRAMUSCULAR; INTRAVENOUS
Status: COMPLETED
Start: 2019-11-12 | End: 2019-11-12

## 2019-11-12 RX ORDER — CEFAZOLIN SODIUM 1 G/3ML
INJECTION, POWDER, FOR SOLUTION INTRAMUSCULAR; INTRAVENOUS
Status: DISPENSED
Start: 2019-11-12 | End: 2019-11-13

## 2019-11-12 RX ORDER — HEPARIN SODIUM 1000 [USP'U]/ML
3700 INJECTION, SOLUTION INTRAVENOUS; SUBCUTANEOUS
Status: DISCONTINUED | OUTPATIENT
Start: 2019-11-12 | End: 2019-11-18 | Stop reason: HOSPADM

## 2019-11-12 RX ADMIN — FENTANYL CITRATE 50 MCG: 50 INJECTION INTRAMUSCULAR; INTRAVENOUS at 14:32

## 2019-11-12 RX ADMIN — NITROGLYCERIN 0.4 MG: 0.4 TABLET, ORALLY DISINTEGRATING SUBLINGUAL at 11:33

## 2019-11-12 RX ADMIN — HEPARIN SODIUM 3700 UNITS: 1000 INJECTION, SOLUTION INTRAVENOUS; SUBCUTANEOUS at 17:25

## 2019-11-12 RX ADMIN — SODIUM BICARBONATE 650 MG: 650 TABLET ORAL at 11:33

## 2019-11-12 RX ADMIN — ASPIRIN 81 MG: 81 TABLET, COATED ORAL at 05:06

## 2019-11-12 RX ADMIN — ATORVASTATIN CALCIUM 20 MG: 20 TABLET, FILM COATED ORAL at 17:55

## 2019-11-12 RX ADMIN — MIDAZOLAM HYDROCHLORIDE 1 MG: 1 INJECTION, SOLUTION INTRAMUSCULAR; INTRAVENOUS at 14:21

## 2019-11-12 RX ADMIN — FENTANYL CITRATE 25 MCG: 50 INJECTION INTRAMUSCULAR; INTRAVENOUS at 14:10

## 2019-11-12 RX ADMIN — METOPROLOL TARTRATE 12.5 MG: 25 TABLET, FILM COATED ORAL at 17:55

## 2019-11-12 RX ADMIN — PRASUGREL 10 MG: 10 TABLET, FILM COATED ORAL at 09:01

## 2019-11-12 RX ADMIN — SODIUM BICARBONATE: 84 INJECTION, SOLUTION INTRAVENOUS at 11:54

## 2019-11-12 RX ADMIN — MIDAZOLAM HYDROCHLORIDE 1 MG: 1 INJECTION, SOLUTION INTRAMUSCULAR; INTRAVENOUS at 14:10

## 2019-11-12 RX ADMIN — CEFAZOLIN SODIUM 2 G: 2 INJECTION, SOLUTION INTRAVENOUS at 14:10

## 2019-11-12 RX ADMIN — SENNOSIDES AND DOCUSATE SODIUM 2 TABLET: 8.6; 5 TABLET ORAL at 05:06

## 2019-11-12 RX ADMIN — METOPROLOL TARTRATE 12.5 MG: 25 TABLET, FILM COATED ORAL at 05:06

## 2019-11-12 RX ADMIN — FENTANYL CITRATE 25 MCG: 50 INJECTION INTRAMUSCULAR; INTRAVENOUS at 14:21

## 2019-11-12 RX ADMIN — SODIUM BICARBONATE 650 MG: 650 TABLET ORAL at 17:55

## 2019-11-12 RX ADMIN — PROCHLORPERAZINE MALEATE 5 MG: 5 TABLET ORAL at 21:52

## 2019-11-12 RX ADMIN — METOPROLOL TARTRATE 12.5 MG: 25 TABLET, FILM COATED ORAL at 00:21

## 2019-11-12 RX ADMIN — SODIUM BICARBONATE 650 MG: 650 TABLET ORAL at 09:01

## 2019-11-12 ASSESSMENT — COGNITIVE AND FUNCTIONAL STATUS - GENERAL
SUGGESTED CMS G CODE MODIFIER DAILY ACTIVITY: CH
DAILY ACTIVITIY SCORE: 24
MOBILITY SCORE: 24
SUGGESTED CMS G CODE MODIFIER MOBILITY: CH

## 2019-11-12 ASSESSMENT — ENCOUNTER SYMPTOMS
WHEEZING: 0
ORTHOPNEA: 0
TROUBLE SWALLOWING: 0
SHORTNESS OF BREATH: 1
ABDOMINAL DISTENTION: 0
CHILLS: 0
TINGLING: 0
DIZZINESS: 0
FEVER: 0
HEADACHES: 0
HEMOPTYSIS: 0
AGITATION: 0
PHOTOPHOBIA: 0
MYALGIAS: 0
SENSORY CHANGE: 0
ABDOMINAL PAIN: 0
CHEST TIGHTNESS: 0
CONFUSION: 0
NERVOUS/ANXIOUS: 0
SHORTNESS OF BREATH: 0
DIARRHEA: 0
CLAUDICATION: 0
COLOR CHANGE: 0
WEIGHT LOSS: 0
NAUSEA: 0
SPEECH CHANGE: 0
PALPITATIONS: 0
CONSTIPATION: 0
HEARTBURN: 0
SPUTUM PRODUCTION: 1
DIAPHORESIS: 0
BLURRED VISION: 0
DOUBLE VISION: 0
BLOOD IN STOOL: 0
COUGH: 1
NUMBNESS: 0
COUGH: 0

## 2019-11-12 ASSESSMENT — LIFESTYLE VARIABLES
HOW MANY TIMES IN THE PAST YEAR HAVE YOU HAD 5 OR MORE DRINKS IN A DAY: 0
TOTAL SCORE: 0
ALCOHOL_USE: NO
TOTAL SCORE: 0
HAVE YOU EVER FELT YOU SHOULD CUT DOWN ON YOUR DRINKING: NO
CONSUMPTION TOTAL: NEGATIVE
EVER FELT BAD OR GUILTY ABOUT YOUR DRINKING: NO
AVERAGE NUMBER OF DAYS PER WEEK YOU HAVE A DRINK CONTAINING ALCOHOL: 0
ON A TYPICAL DAY WHEN YOU DRINK ALCOHOL HOW MANY DRINKS DO YOU HAVE: 0
TOTAL SCORE: 0
EVER HAD A DRINK FIRST THING IN THE MORNING TO STEADY YOUR NERVES TO GET RID OF A HANGOVER: NO
EVER_SMOKED: YES
HAVE PEOPLE ANNOYED YOU BY CRITICIZING YOUR DRINKING: NO

## 2019-11-12 ASSESSMENT — COPD QUESTIONNAIRES
IN THE PAST 12 MONTHS DO YOU DO LESS THAN YOU USED TO BECAUSE OF YOUR BREATHING PROBLEMS: AGREE
HAVE YOU SMOKED AT LEAST 100 CIGARETTES IN YOUR ENTIRE LIFE: YES
DO YOU EVER COUGH UP ANY MUCUS OR PHLEGM?: YES, A FEW DAYS A WEEK OR MONTH
DURING THE PAST 4 WEEKS HOW MUCH DID YOU FEEL SHORT OF BREATH: SOME OF THE TIME
COPD SCREENING SCORE: 7

## 2019-11-12 NOTE — ED NOTES
1820 Report to Madonna SWEENEY at [facility] ER   1825 UNIQUE here Report given  1835 Pt transferred to [facility] via UNIQEU leblanc CP 2/10 ST on monitor IV ABX complete NSL patent

## 2019-11-12 NOTE — PROGRESS NOTES
Cardiology Follow Up Progress Note    Date of Service  11/12/2019    Attending Physician  KEREN Canaad M.D.    Chief Complaint   Chest pain    Cardiology consult   Elevated trop    HPI  Adalid Sepulveda is a 73 y.o. male admitted 11/11/2019 with chest pain. History of previous stenting at [facility] with Dr. Mann.    Past medical history of ESRD previous placement of AV fistula and failed, hypertension and diabetes.    Interim Events  11/12/19: currently his chest pain has resolved. Denies any SOB, dizziness or palpitations. SR . Patient is hesitant regarding dialysis and quality of life. Continue heparin gtt. He stated his shortness of breath and chest pain started about a month ago and progressing.     Trop T 816  Creatinine 6.53  GFR 8  CO2 7  Anion gap 17      Review of Systems  Review of Systems   Constitutional: Negative for chills, diaphoresis and fever.   HENT: Negative for nosebleeds and trouble swallowing.    Respiratory: Negative for cough, chest tightness and shortness of breath.    Cardiovascular: Negative for chest pain, palpitations and leg swelling.   Gastrointestinal: Negative for abdominal distention, abdominal pain and blood in stool.   Genitourinary: Negative for hematuria.   Skin: Negative for color change.   Neurological: Negative for dizziness, syncope and numbness.   Psychiatric/Behavioral: Negative for agitation and confusion. The patient is not nervous/anxious.        Vital signs in last 24 hours  Temp:  [36.9 °C (98.5 °F)-37.2 °C (99 °F)] 37.2 °C (99 °F)  Pulse:  [] 87  Resp:  [18] 18  BP: (114-127)/(51-64) 127/60  SpO2:  [97 %-100 %] 100 %    Physical Exam  Physical Exam  Vitals signs and nursing note reviewed.   Constitutional:       Appearance: Normal appearance.   HENT:      Head: Normocephalic and atraumatic.   Eyes:      Pupils: Pupils are equal, round, and reactive to light.   Neck:      Musculoskeletal: Normal range of motion.    Cardiovascular:      Rate and Rhythm: Normal rate and regular rhythm.      Heart sounds: Normal heart sounds. No murmur.   Pulmonary:      Effort: Pulmonary effort is normal.      Breath sounds: Normal breath sounds.   Abdominal:      General: Abdomen is flat.   Skin:     General: Skin is warm and dry.   Neurological:      General: No focal deficit present.      Mental Status: He is alert and oriented to person, place, and time.   Psychiatric:         Mood and Affect: Mood normal.         Behavior: Behavior normal.         Thought Content: Thought content normal.         Judgment: Judgment normal.         Lab Review  Lab Results   Component Value Date/Time    WBC 8.0 11/12/2019 05:57 AM    RBC 2.50 (L) 11/12/2019 05:57 AM    HEMOGLOBIN 7.7 (L) 11/12/2019 05:57 AM    HEMATOCRIT 23.3 (L) 11/12/2019 05:57 AM    MCV 93.2 11/12/2019 05:57 AM    MCH 30.8 11/12/2019 05:57 AM    MCHC 33.0 (L) 11/12/2019 05:57 AM    MPV 9.1 11/12/2019 05:57 AM      Lab Results   Component Value Date/Time    SODIUM 141 11/12/2019 05:57 AM    POTASSIUM 4.2 11/12/2019 05:57 AM    CHLORIDE 117 (H) 11/12/2019 05:57 AM    CO2 7 (LL) 11/12/2019 05:57 AM    GLUCOSE 81 11/12/2019 05:57 AM     (HH) 11/12/2019 05:57 AM    CREATININE 6.53 (HH) 11/12/2019 05:57 AM      Lab Results   Component Value Date/Time    ASTSGOT 8 (L) 11/12/2019 05:57 AM    ALTSGPT 18 11/12/2019 05:57 AM     Lab Results   Component Value Date/Time    CHOLSTRLTOT 133 04/08/2019 11:56 AM    LDL 61 04/08/2019 11:56 AM    HDL 26 (A) 04/08/2019 11:56 AM    TRIGLYCERIDE 232 (H) 04/08/2019 11:56 AM    TROPONINT 816 (H) 11/12/2019 05:57 AM       No results for input(s): NTPROBNP in the last 72 hours.    Cardiac Imaging and Procedures Review  EKG:    SINUS RHYTHM   RBBB AND LAFB   ABNORMAL T, CONSIDER ISCHEMIA, LATERAL LEADS   Compared to ECG 11/12/2019 00:48:53   No significant changes       Echocardiogram:  Pending       Assessment/Plan  No new Assessment & Plan notes have been  filed under this hospital service since the last note was generated.  Service: Cardiology    1. NSTEMI:   - peaked at 829, symptoms has improved  - records from [facility] pending   - recommend consulting nephrology for evaluation ESRD and dialysis. Uremic and metabolic acidosis   - we will hold off cardiac catheterization at this time due to contrast injection and ESRD. Consider after dialysis.    - continue asa, atorvastatin 20mg qd, effient 10mg qd and heparin gtt   - continue metoprolol 12.5mg BID, if he becomes symptomatic recommend increasing metoprolol      2. ESRD:  - recommend nephrology consult     Thank you for allowing me to participate in the care of this patient.  I will continue to follow this patient    Please contact me with any questions.    BABAR Perry   [facility]

## 2019-11-12 NOTE — ED PROVIDER NOTES
ED Provider Note    CHIEF COMPLAINT  Chief Complaint   Patient presents with   • Shortness of Breath   • Hoarse   • Weakness   • Loss of Appetite   • Cough   • Rash       HPI  Luis Sepulveda is a 73 y.o. male who presents complaining of shortness of breath coughing hoarseness chronic diarrhea and vomiting generalized weakness loss of appetite and rash.  His symptoms have been going on for months but the hoarseness is been for the last couple of days.  He is also had a rash on his back trunk and extremities for 2 days.  He has had subjective fevers.  He does have a history of kidney disease for which they tried to put a vascular fistula in but it failed.  He has been mapped for another fistula but has not yet had that done.  He denies any abdominal pain.  He states he has had night sweats and weight loss.  His wife states that he is very weak.    REVIEW OF SYSTEMS  HEENT:  No ear pain, congestion or positive sore throat positive hoarseness  EYES: no discharge redness or vision changes  CARDIAC: Positive for 2 weeks of chest pain, positive palpitations    PULMONARY: no dyspnea, positive nonproductive cough or congestion   GI: Chronic vomiting and diarrhea no abdominal pain   : no dysuria, back pain or hematuria   Neuro: Generalized weakness, numbness aphasia or headache  Musculoskeletal: no swelling deformity or pain no joint swelling  Endocrine: Subjective fevers, sweating, positive weight loss   SKIN: positive rash, no erythema or contusions     See history of present illness all other systems are negative    PAST MEDICAL HISTORY  Past Medical History:   Diagnosis Date   • CAD (coronary artery disease)     stents   • Chronic kidney disease (CKD), stage V (HCC)    • Diabetes    • Hypertension    • Osteoarthritis    • Peripheral neuropathy        FAMILY HISTORY  Family History   Problem Relation Age of Onset   • Heart Disease Mother    • Alcohol/Drug Father    • Thyroid Son    • Diabetes Brother    •  Hypertension Neg Hx    • Hyperlipidemia Neg Hx        SOCIAL HISTORY  Social History     Socioeconomic History   • Marital status:      Spouse name: Not on file   • Number of children: Not on file   • Years of education: Not on file   • Highest education level: Not on file   Occupational History   • Not on file   Social Needs   • Financial resource strain: Not on file   • Food insecurity:     Worry: Not on file     Inability: Not on file   • Transportation needs:     Medical: Not on file     Non-medical: Not on file   Tobacco Use   • Smoking status: Former Smoker     Packs/day: 1.00     Years: 55.00     Pack years: 55.00     Last attempt to quit: 2014     Years since quittin.8   • Smokeless tobacco: Never Used   Substance and Sexual Activity   • Alcohol use: No   • Drug use: No   • Sexual activity: Not on file   Lifestyle   • Physical activity:     Days per week: Not on file     Minutes per session: Not on file   • Stress: Not on file   Relationships   • Social connections:     Talks on phone: Not on file     Gets together: Not on file     Attends Nondenominational service: Not on file     Active member of club or organization: Not on file     Attends meetings of clubs or organizations: Not on file     Relationship status: Not on file   • Intimate partner violence:     Fear of current or ex partner: Not on file     Emotionally abused: Not on file     Physically abused: Not on file     Forced sexual activity: Not on file   Other Topics Concern   • Not on file   Social History Narrative   • Not on file       SURGICAL HISTORY  Past Surgical History:   Procedure Laterality Date   • APPENDECTOMY     • OTHER CARDIAC SURGERY      stents   • OTHER ORTHOPEDIC SURGERY      knee surgery       CURRENT MEDICATIONS  Home Medications     Reviewed by Sandra Edward (Pharmacy Tech) on 19 at 1644  Med List Status: Complete   Medication Last Dose Status   acetaminophen (TYLENOL) 500 MG Tab 11/10/2019 Active  "  aspirin (ASA) 81 MG CHEW 11/10/2019 Active   carvedilol (COREG) 6.25 MG Tab 11/10/2019 Active   diphenhydrAMINE (BENADRYL) 25 MG Tab PRN Active   furosemide (LASIX) 40 MG Tab 11/10/2019 Active   LANTUS SOLOSTAR 100 UNIT/ML Solution Pen-injector injection 11/10/2019 Active   pantoprazole (PROTONIX) 40 MG Tablet Delayed Response PRN Active   prasugrel (EFFIENT) 10 MG TABS 11/9/2019 Active   sodium bicarbonate (SODIUM BICARBONATE) 650 MG Tab 11/9/2019 Active                ALLERGIES  No Known Allergies    PHYSICAL EXAM  VITAL SIGNS: /49   Pulse (!) 107   Temp 36.7 °C (98 °F) (Temporal)   Resp (!) 32   Ht 1.676 m (5' 6\")   Wt 70.8 kg (156 lb 1.4 oz)   SpO2 100%   BMI 25.19 kg/m²   Constitutional: Well developed, Well nourished, No acute distress, Non-toxic appearance.   HEENT: Normocephalic, Atraumatic,  external ears normal, pharynx pink,  Mucous  Membranes moist, No rhinorrhea or mucosal edema hoarse voice no neck masses or fullness  Eyes: PERRL, EOMI, Conjunctiva normal, No discharge.   Neck: Normal range of motion, No tenderness, Supple, No stridor.   Lymphatic: No lymphadenopathy    Cardiovascular: Regular Rate and Rhythm, No murmurs,  rubs, or gallops.   Thorax & Lungs: Lungs clear to auscultation bilaterally, No respiratory distress, No wheezes, rhales or rhonchi, No chest wall tenderness.   Abdomen: Bowel sounds normal, Soft, non tender, non distended,  No pulsatile masses., no rebound guarding or peritoneal signs.   Skin: Warm, Dry, No erythema, positive for a maculopapular rash with central areas of pustules that appear to be folliculitis on his back neck and upper extremities,   Back:  No CVA tenderness,  No spinal tenderness, bony crepitance step offs or instability.   Extremities: Equal, intact distal pulses, No cyanosis, clubbing or edema,  No tenderness.   Musculoskeletal: Good range of motion in all major joints. No tenderness to palpation or major deformities noted.   Neurologic: Alert & " oriented x 3, Cranial nerves II-XII intact, Equal strength and sensation upper and lower extremities bilaterally,  No focal deficits noted.   Psychiatric: Affect normal, Judgment normal, Mood normal.        RADIOLOGY/PROCEDURES  DX-CHEST-PORTABLE (1 VIEW)   Final Result      Scarring in the right lung base with faint pleural calcification and chronic interstitial change. Superimposed infection cannot be excluded.            COURSE & MEDICAL DECISION MAKING  Pertinent Labs & Imaging studies reviewed. (See chart for details)  Differential diagnosis: Worsening renal failure, folliculitis, pharyngitis, dehydration,        Results for orders placed or performed during the hospital encounter of 11/11/19   CBC WITH DIFFERENTIAL   Result Value Ref Range    WBC 7.6 4.8 - 10.8 K/uL    RBC 2.76 (L) 4.70 - 6.10 M/uL    Hemoglobin 8.4 (L) 14.0 - 18.0 g/dL    Hematocrit 24.6 (L) 42.0 - 52.0 %    MCV 89.1 81.4 - 97.8 fL    MCH 30.4 27.0 - 33.0 pg    MCHC 34.1 33.7 - 35.3 g/dL    RDW 46.7 35.9 - 50.0 fL    Platelet Count 242 164 - 446 K/uL    MPV 8.9 (L) 9.0 - 12.9 fL    Neutrophils-Polys 75.90 (H) 44.00 - 72.00 %    Lymphocytes 10.10 (L) 22.00 - 41.00 %    Monocytes 12.70 0.00 - 13.40 %    Eosinophils 0.50 0.00 - 6.90 %    Basophils 0.30 0.00 - 1.80 %    Immature Granulocytes 0.50 0.00 - 0.90 %    Nucleated RBC 0.00 /100 WBC    Neutrophils (Absolute) 5.73 1.82 - 7.42 K/uL    Lymphs (Absolute) 0.76 (L) 1.00 - 4.80 K/uL    Monos (Absolute) 0.96 (H) 0.00 - 0.85 K/uL    Eos (Absolute) 0.04 0.00 - 0.51 K/uL    Baso (Absolute) 0.02 0.00 - 0.12 K/uL    Immature Granulocytes (abs) 0.04 0.00 - 0.11 K/uL    NRBC (Absolute) 0.00 K/uL   COMP METABOLIC PANEL   Result Value Ref Range    Sodium 137 135 - 145 mmol/L    Potassium 4.2 3.6 - 5.5 mmol/L    Chloride 111 96 - 112 mmol/L    Co2 7 (LL) 20 - 33 mmol/L    Anion Gap 19.0 (H) 0.0 - 11.9    Glucose 142 (H) 65 - 99 mg/dL    Bun 125 (HH) 8 - 22 mg/dL    Creatinine 7.23 (HH) 0.50 - 1.40 mg/dL     Calcium 8.8 8.4 - 10.2 mg/dL    AST(SGOT) 8 (L) 12 - 45 U/L    ALT(SGPT) 18 2 - 50 U/L    Alkaline Phosphatase 179 (H) 30 - 99 U/L    Total Bilirubin 0.2 0.1 - 1.5 mg/dL    Albumin 3.7 3.2 - 4.9 g/dL    Total Protein 6.7 6.0 - 8.2 g/dL    Globulin 3.0 1.9 - 3.5 g/dL    A-G Ratio 1.2 g/dL   LACTIC ACID   Result Value Ref Range    Lactic Acid 1.0 0.5 - 2.0 mmol/L   TROPONIN   Result Value Ref Range    Troponin T 829 (H) 6 - 19 ng/L   ESTIMATED GFR   Result Value Ref Range    GFR If  9 (A) >60 mL/min/1.73 m 2    GFR If Non African American 7 (A) >60 mL/min/1.73 m 2   EKG   Result Value Ref Range    Report       [facility] Emergency Dept.    Test Date:  2019  Pt Name:    KLARISSA BRADSHAW              Department: [facility]  MRN:        [id number]                      Room:       -ROOM 2  Gender:     Male                         Technician: DELTA  :        [id number]                   Requested By:ER TRIAGE PROTOCOL  Order #:    [id number]                    Reading MD: RAJ ARROYO MD    Measurements  Intervals                                Axis  Rate:       106                          P:          96  CA:         153                          QRS:        -69  QRSD:       148                          T:          98  QT:         388  QTc:        516    Interpretive Statements  Sinus tachycardia  RBBB and LAFB  Abnormal T, consider ischemia, lateral leads  Compared to ECG 2018 13:55:25  T-wave abnormality now present  Possible ischemia now present  Sinus rhythm no longer present  Electronically Signed On 2019 17:12:45 PST by RAJ ARROYO MD         6:00pm an IV was started and labs were drawn.  Because of the patient's plugging for sepsis I did give him antibiotics in the form of Rocephin and Bactrim because of the folliculitis that appeared on his back.      6:11 PM spoke with Dr. Vivas cardiology who agrees the patient needs to be transferred to [facility] for cardiac evaluation as well as nephrology evaluation and dialysis.  I have paged Dr gAee nephrology consult on the patient's case for dialysis at [facility].    6:12 PM patient understands his need for transfer at this time.  His rash might be due to uremia however he still has been treated with antibiotics here.  The patient understands that he will require dialysis on this visit as well as a probably a cardiac catheterization.  He will be transferred to [facility] in guarded condition.  I spoke with Dr. Maria who accepts him for transfer.    Critical Care  Due to the real possibility of a deterioration of this patient's condition required the highest level of my preparedness for sudden emergent intervention. I provided critical care services which included medication orders, frequent reevaluations of the patient's condition and response to treatment, ordering and reviewing test results and discussing the case with various consultants. The critical care time associated with the care of the patient was 40 minutes. Review chart for interventions. This time is exclusive of any other billable procedures.      FINAL IMPRESSION  1. Acute renal failure superimposed on stage 5 chronic kidney disease, not on chronic dialysis, unspecified acute renal failure type (HCC)    2. NSTEMI (non-ST elevated myocardial infarction) (HCC)           PLAN/DISPOSITION  Transferred to [facility] in guarded condition          Electronically signed by: Lisa Lacy, 11/11/2019 4:54 PM

## 2019-11-12 NOTE — PROGRESS NOTES
2 RN Skin assessment . Skin assessed with RN Mario . Skin intact no open areas noted. Left foot pale white slow to bell. 4th digit with nail falling off , tape around it , painful. Elbows dry, back and chest with raised rash.

## 2019-11-12 NOTE — ED TRIAGE NOTES
.  Chief Complaint   Patient presents with   • Shortness of Breath   • Chest Pain      Pt BIB EMS as transfer from [facility]. Pt went to ED today with C/O SOB and chest pain for a couple of weeks.

## 2019-11-12 NOTE — PROGRESS NOTES
Lab called with critical results of CO2 of 5, , creatinine 6.78, and troponin of 749 at 1509. Critical lab result read back to lab.   Dr. Holland notified of critical lab results at 1511.  Critical lab results read back by Dr. Holland.

## 2019-11-12 NOTE — H&P
Internal Medicine Admitting History and Physical    Note Author: Steve Perez M.D.       Name Luis Sepulveda     1945   Age/Sex 73 y.o. male   MRN [id number]   Code Status DNR/ok to intubate      After 5PM or if no immediate response to page, please call for cross-coverage  Attending/Team: Dr Canada/Kayley  See Patient List for primary contact information  Call [phone] to page    Dr Lee 2nd Call - Day Sr. Resident (R2/R3):   Dr Weber        Chief Complaint:   Chest pain, SOB       HPI:  Mr Sepulveda is 73-year-old male with past medical history of end-stage renal disease, type 2 diabetes mellitus, dyslipidemia, hypertension, hypothyroidism, congestive heart failure and coronary artery disease S/p stent placed 7 years ago, presenting to the emergency department for evaluation of worsening chest pain and shortness of breath onset 3 weeks ago.  Patient states 3 weeks ago he started having worsening shortness of breath subsequently followed by substernal chest pain, radiating to the left side of the chest, 7/10 in intensity, intermittent pain, he describes it as sharp pain, exacerbated by exertion and alleviated by resting and taking deep breaths.  No associated nausea ,diaphoresis and vomiting.  Upon questioning patient states that he had sore throat before his symptoms started.  He also complains of cough that started 3 weeks ago as well productive of greyish sputum, no hemoptysis.  He has history of diabetes mellitus and hypertension but admits to being noncompliant with his medication stating that he does not take his medication when he is feeling good.  He denies any orthopnea, paroxysmal nocturnal dyspnea however has leg swelling intermittently.  He has history of end-stage renal disease and follows with nephrology,  he has never been on dialysis,.  Nephrology is planning to  place AV fistula for future dialysis.  There are no signs of fluid overload  and patient also denies chest  pain at this moment.  In the emergency department patient is in no acute distress.  Vitals are temperature is 37,pulse 97, respiration 18, blood pressure 114/60 ,he is saturating in high 90s on room air.  Labs showed creatinine 7.23, bun 125, GFR 8, electrolytes are all within normal range except for potassium 3.7.  Troponin 829, chest x-ray showed scarring in the right lung base with faint pleural calcification and chronic interstitial changes,, EKG showed sinus tachycardia, RBPP and LAFB, abnormality T indicating ischemia in lateral leads.  Cardiology has been consulted will see the patient at bedside and will give further recommendations.        Review of Systems   Constitutional: Negative for chills, fever and weight loss.   HENT: Negative for ear pain, hearing loss and tinnitus.    Eyes: Negative for blurred vision, double vision and photophobia.   Respiratory: Positive for cough, sputum production and shortness of breath. Negative for hemoptysis and wheezing.    Cardiovascular: Positive for chest pain. Negative for palpitations, orthopnea, claudication and leg swelling.   Gastrointestinal: Negative for abdominal pain, constipation, diarrhea, heartburn and nausea.   Genitourinary: Negative for dysuria and urgency.   Musculoskeletal: Negative for myalgias.   Skin: Negative for rash.   Neurological: Negative for dizziness, tingling, sensory change, speech change and headaches.             Past Medical History (Chronic medical problem, known complications and current treatment)    Past Medical History:   Diagnosis Date   • CAD (coronary artery disease)     stents   • Chronic kidney disease (CKD), stage V (HCC)    • Diabetes    • Hypertension    • Osteoarthritis    • Peripheral neuropathy          Past Surgical History:  Past Surgical History:   Procedure Laterality Date   • APPENDECTOMY     • OTHER CARDIAC SURGERY      stents   • OTHER ORTHOPEDIC SURGERY      knee surgery       Current Outpatient  Medications:  Home Medications     Reviewed by Sandra Finn (Pharmacy Tech) on 19 at 2012  Med List Status: Complete   Medication Last Dose Status   acetaminophen (TYLENOL) 500 MG Tab 11/10/2019 Active   aspirin (ASA) 81 MG CHEW 11/10/2019 Active   carvedilol (COREG) 6.25 MG Tab 11/10/2019 Active   diphenhydrAMINE (BENADRYL) 25 MG Tab unknown Active   furosemide (LASIX) 40 MG Tab 11/10/2019 Active   LANTUS SOLOSTAR 100 UNIT/ML Solution Pen-injector injection 11/10/2019 Active   pantoprazole (PROTONIX) 40 MG Tablet Delayed Response unknown Active   prasugrel (EFFIENT) 10 MG TABS 2019 Active   sodium bicarbonate (SODIUM BICARBONATE) 650 MG Tab 2019 Active                Medication Allergy/Sensitivities:  No Known Allergies      Family History (mandatory)   Family History   Problem Relation Age of Onset   • Heart Disease Mother    • Alcohol/Drug Father    • Thyroid Son    • Diabetes Brother    • Hypertension Neg Hx    • Hyperlipidemia Neg Hx        Social History (mandatory)   Social History     Socioeconomic History   • Marital status:      Spouse name: Not on file   • Number of children: Not on file   • Years of education: Not on file   • Highest education level: Not on file   Occupational History   • Not on file   Social Needs   • Financial resource strain: Not on file   • Food insecurity:     Worry: Not on file     Inability: Not on file   • Transportation needs:     Medical: Not on file     Non-medical: Not on file   Tobacco Use   • Smoking status: Former Smoker     Packs/day: 1.00     Years: 55.00     Pack years: 55.00     Last attempt to quit: 2014     Years since quittin.8   • Smokeless tobacco: Never Used   Substance and Sexual Activity   • Alcohol use: No   • Drug use: No   • Sexual activity: Not on file   Lifestyle   • Physical activity:     Days per week: Not on file     Minutes per session: Not on file   • Stress: Not on file   Relationships   • Social connections:  "    Talks on phone: Not on file     Gets together: Not on file     Attends Holiness service: Not on file     Active member of club or organization: Not on file     Attends meetings of clubs or organizations: Not on file     Relationship status: Not on file   • Intimate partner violence:     Fear of current or ex partner: Not on file     Emotionally abused: Not on file     Physically abused: Not on file     Forced sexual activity: Not on file   Other Topics Concern   • Not on file   Social History Narrative   • Not on file     Living situation: lives   PCP : Patito Edgar M.D.    Physical Exam     Vitals:    11/11/19 1924 11/11/19 2100 11/11/19 2200 11/12/19 0000   BP: 118/51 114/60 114/60 125/64   Pulse: (!) 105 (!) 107 97 85   Resp: 18  18 18   Temp: 37.2 °C (98.9 °F) 37.2 °C (99 °F) 37.2 °C (99 °F) 37.1 °C (98.7 °F)   TempSrc: Temporal Temporal Temporal Temporal   SpO2: 97% 100% 100% 99%   Weight:   70.3 kg (154 lb 15.7 oz)    Height:   1.651 m (5' 5\")      Body mass index is 25.79 kg/m².  O2 therapy: Pulse Oximetry: 99 %, O2 Delivery: None (Room Air)    Physical Exam   Constitutional: He is oriented to person, place, and time and well-developed, well-nourished, and in no distress. No distress.   HENT:   Head: Normocephalic and atraumatic.   Mouth/Throat: No oropharyngeal exudate.   Eyes: Pupils are equal, round, and reactive to light. EOM are normal.   Neck: Normal range of motion. Neck supple.   Cardiovascular: Normal rate, regular rhythm and intact distal pulses.   S3 gallop   Pulmonary/Chest: Effort normal and breath sounds normal. No respiratory distress. He has no wheezes. He has no rales.   Abdominal: Soft. Bowel sounds are normal. He exhibits no distension. There is no tenderness. There is no rebound.   Musculoskeletal: Normal range of motion.         General: No edema.   Neurological: He is alert and oriented to person, place, and time. No cranial nerve deficit. Gait normal. Coordination normal. "   Skin: Skin is warm and dry. No erythema.         Data Review       Old Records Request:   Completed  Current Records review/summary: Completed    Lab Data Review:  Recent Results (from the past 24 hour(s))   EKG    Collection Time: 19  4:09 PM   Result Value Ref Range    Report       [facility] Emergency Dept.    Test Date:  2019  Pt Name:    KLARISSA BRADSHAW              Department: [facility]  MRN:        [id number]                      [facility]  Gender:     Male                         Technician: DELTA  :        1945                   Requested By:ER TRIAGE PROTOCOL  Order #:    [id number]                    Reading MD: RAJ ARROYO MD    Measurements  Intervals                                Axis  Rate:       106                          P:          96  NJ:         153                          QRS:        -69  QRSD:       148                          T:          98  QT:         388  QTc:        516    Interpretive Statements  Sinus tachycardia  RBBB and LAFB  Abnormal T, consider ischemia, lateral leads  Compared to ECG 2018 13:55:25  T-wave abnormality now present  Possible ischemia now present  Sinus rhythm no longer present  Electronically Signed On 2019 17:12:45 PST by RAJ ARROYO MD     CBC WITH DIFFERENTIAL    Collection Time: 19  4:50 PM   Result Value Ref Range    WBC 7.6 4.8 - 10.8 K/uL    RBC 2.76 (L) 4.70 - 6.10 M/uL    Hemoglobin 8.4 (L) 14.0 - 18.0 g/dL    Hematocrit 24.6 (L) 42.0 - 52.0 %    MCV 89.1 81.4 - 97.8 fL    MCH 30.4 27.0 - 33.0 pg    MCHC 34.1 33.7 - 35.3 g/dL    RDW 46.7 35.9 - 50.0 fL    Platelet Count 242 164 - 446 K/uL    MPV 8.9 (L) 9.0 - 12.9 fL    Neutrophils-Polys 75.90 (H) 44.00 - 72.00 %    Lymphocytes 10.10 (L) 22.00 - 41.00 %    Monocytes 12.70 0.00 - 13.40 %    Eosinophils 0.50 0.00 - 6.90 %    Basophils 0.30 0.00 - 1.80 %    Immature Granulocytes 0.50 0.00 - 0.90 %    Nucleated RBC 0.00 /100 WBC     Neutrophils (Absolute) 5.73 1.82 - 7.42 K/uL    Lymphs (Absolute) 0.76 (L) 1.00 - 4.80 K/uL    Monos (Absolute) 0.96 (H) 0.00 - 0.85 K/uL    Eos (Absolute) 0.04 0.00 - 0.51 K/uL    Baso (Absolute) 0.02 0.00 - 0.12 K/uL    Immature Granulocytes (abs) 0.04 0.00 - 0.11 K/uL    NRBC (Absolute) 0.00 K/uL   COMP METABOLIC PANEL    Collection Time: 11/11/19  4:50 PM   Result Value Ref Range    Sodium 137 135 - 145 mmol/L    Potassium 4.2 3.6 - 5.5 mmol/L    Chloride 111 96 - 112 mmol/L    Co2 7 (LL) 20 - 33 mmol/L    Anion Gap 19.0 (H) 0.0 - 11.9    Glucose 142 (H) 65 - 99 mg/dL    Bun 125 (HH) 8 - 22 mg/dL    Creatinine 7.23 (HH) 0.50 - 1.40 mg/dL    Calcium 8.8 8.4 - 10.2 mg/dL    AST(SGOT) 8 (L) 12 - 45 U/L    ALT(SGPT) 18 2 - 50 U/L    Alkaline Phosphatase 179 (H) 30 - 99 U/L    Total Bilirubin 0.2 0.1 - 1.5 mg/dL    Albumin 3.7 3.2 - 4.9 g/dL    Total Protein 6.7 6.0 - 8.2 g/dL    Globulin 3.0 1.9 - 3.5 g/dL    A-G Ratio 1.2 g/dL   LACTIC ACID    Collection Time: 11/11/19  4:50 PM   Result Value Ref Range    Lactic Acid 1.0 0.5 - 2.0 mmol/L   TROPONIN    Collection Time: 11/11/19  4:50 PM   Result Value Ref Range    Troponin T 829 (H) 6 - 19 ng/L   ESTIMATED GFR    Collection Time: 11/11/19  4:50 PM   Result Value Ref Range    GFR If  9 (A) >60 mL/min/1.73 m 2    GFR If Non African American 7 (A) >60 mL/min/1.73 m 2   TROPONIN    Collection Time: 11/11/19  9:30 PM   Result Value Ref Range    Troponin T 781 (H) 6 - 19 ng/L   COMP METABOLIC PANEL    Collection Time: 11/11/19  9:30 PM   Result Value Ref Range    Sodium 141 135 - 145 mmol/L    Potassium 3.7 3.6 - 5.5 mmol/L    Chloride 116 (H) 96 - 112 mmol/L    Co2 7 (LL) 20 - 33 mmol/L    Anion Gap 18.0 (H) 0.0 - 11.9    Glucose 89 65 - 99 mg/dL    Bun 125 (HH) 8 - 22 mg/dL    Creatinine 6.58 (HH) 0.50 - 1.40 mg/dL    Calcium 8.5 8.5 - 10.5 mg/dL    AST(SGOT) 7 (L) 12 - 45 U/L    ALT(SGPT) 16 2 - 50 U/L    Alkaline Phosphatase 157 (H) 30 - 99 U/L     Total Bilirubin 0.3 0.1 - 1.5 mg/dL    Albumin 3.6 3.2 - 4.9 g/dL    Total Protein 6.4 6.0 - 8.2 g/dL    Globulin 2.8 1.9 - 3.5 g/dL    A-G Ratio 1.3 g/dL   CBC WITH DIFFERENTIAL    Collection Time: 19  9:30 PM   Result Value Ref Range    WBC 8.5 4.8 - 10.8 K/uL    RBC 2.61 (L) 4.70 - 6.10 M/uL    Hemoglobin 8.1 (L) 14.0 - 18.0 g/dL    Hematocrit 24.2 (L) 42.0 - 52.0 %    MCV 92.7 81.4 - 97.8 fL    MCH 31.0 27.0 - 33.0 pg    MCHC 33.5 (L) 33.7 - 35.3 g/dL    RDW 49.2 35.9 - 50.0 fL    Platelet Count 252 164 - 446 K/uL    MPV 9.1 9.0 - 12.9 fL    Neutrophils-Polys 76.60 (H) 44.00 - 72.00 %    Lymphocytes 10.00 (L) 22.00 - 41.00 %    Monocytes 12.60 0.00 - 13.40 %    Eosinophils 0.10 0.00 - 6.90 %    Basophils 0.20 0.00 - 1.80 %    Immature Granulocytes 0.50 0.00 - 0.90 %    Nucleated RBC 0.00 /100 WBC    Neutrophils (Absolute) 6.49 1.82 - 7.42 K/uL    Lymphs (Absolute) 0.85 (L) 1.00 - 4.80 K/uL    Monos (Absolute) 1.07 (H) 0.00 - 0.85 K/uL    Eos (Absolute) 0.01 0.00 - 0.51 K/uL    Baso (Absolute) 0.02 0.00 - 0.12 K/uL    Immature Granulocytes (abs) 0.04 0.00 - 0.11 K/uL    NRBC (Absolute) 0.00 K/uL   ESTIMATED GFR    Collection Time: 19  9:30 PM   Result Value Ref Range    GFR If African American 10 (A) >60 mL/min/1.73 m 2    GFR If Non  8 (A) >60 mL/min/1.73 m 2   EKG (NOW)    Collection Time: 19 10:12 PM   Result Value Ref Range    Report       [facility]    Test Date:  2019  Pt Name:    KLARISSA BRADSHAW              Department: ER  MRN:        [id number]                      Room:       T827  Gender:     Male                         Technician: MORENO  :        1945                   Requested By:VISHNU PIERSON  Order #:    [id number]                    Reading MD:    Measurements  Intervals                                Axis  Rate:       109                          P:          61  MD:         129                          QRS:        -59  QRSD:       144                           T:          97  QT:         369  QTc:        498    Interpretive Statements  SINUS TACHYCARDIA  RBBB AND LAFB  ABNORMAL T, CONSIDER ISCHEMIA, LATERAL LEADS  Compared to ECG 2019 16:09:38  No significant changes     APTT    Collection Time: 19 11:03 PM   Result Value Ref Range    APTT 31.5 24.7 - 36.0 sec   EKG    Collection Time: 19 12:48 AM   Result Value Ref Range    Report       [facility]    Test Date:  2019  Pt Name:    KLARISSA BRADSHAW              Department: 183  MRN:        [id number]                      Room:       [facility]  Gender:     Male                         Technician: [facility]  :        1945                   Requested By:SASHA AUSTIN  Order #:    [id number]                    Reading MD:    Measurements  Intervals                                Axis  Rate:       84                           P:          54  SD:         191                          QRS:        -62  QRSD:       146                          T:          111  QT:         430  QTc:        509    Interpretive Statements  SINUS RHYTHM  RBBB AND LAFB  ABNORMAL T, CONSIDER ISCHEMIA, LATERAL LEADS  Compared to ECG 2019 22:12:11  Sinus tachycardia no longer present  T-wave abnormality still present  Possible ischemia still present     EKG    Collection Time: 19  5:21 AM   Result Value Ref Range    Report       [facility]    Test Date:  2019  Pt Name:    KLARISSA BRADSHAW              Department: 183  MRN:        [id number]                      Room:       T827  Gender:     Male                         Technician: [facility]  :        1945                   Requested By:LI BEAR  Order #:    [id number]                    Reading MD:    Measurements  Intervals                                Axis  Rate:       96                           P:          73  SD:         186                          QRS:        -64  QRSD:       146                          T:          127  QT:          404  QTc:        511    Interpretive Statements  SINUS RHYTHM  RBBB AND LAFB  ABNORMAL T, CONSIDER ISCHEMIA, LATERAL LEADS  Compared to ECG 11/12/2019 00:48:53  No significant changes         Imaging/Procedures Review:    Independant Imaging Review: Completed  EC-ECHOCARDIOGRAM COMPLETE W/O CONT    (Results Pending)            EKG:   EKG Independent Review: Completed  QTc:498, HR: 109, Normal Sinus Rhythm, T Wave changes    Records reviewed and summarized in current documentation :  Yes  [employer] teaching service handout given to patient:  Yes         Assessment/Plan     NSTEMI (non-ST elevated myocardial infarction) (HCC)  Assessment & Plan  Patient presented with worsening chest pain and shortness of breath.  He has history of coronary artery disease with stent placed 7 years ago and congestive heart failure.  He has risk factors such as diabetes mellitus, hypertension, dyslipidemia, family history of coronary artery disease, 45 years smoking history.  On admission troponin were 829 with a EKG showing T wave changes indicating ischemia in lateral leads.  Cardiology has seen the patient at bedside give few recommendations  Patient is on dual therapy of aspirin and Prasugrel.      Admit to telemetry  Continue pulse ox  Serial troponin, serial EKG  Follow on echo   Continue heparin  Metoprolol 12.5 mg   Continue dual therapy  Follow with cardiology.      Chronic kidney disease (CKD), stage V ([facility])- (present on admission)  Assessment & Plan  Patient has history of chronic kidney disease stage V   Presenting to the emergency department due to worsening chest pain and shortness of breath  Per patient he is following with nephrology as outpatient.  He has history of AV fistula placed in the past which failed.,  He has never been on dialysis before.  Patient states that the nephrologist is planning on placing another fistula for future dialysis.    On admission his creatinine is 7.23, bun 125, electrolytes are within normal  range.  Admit to telemetry  Avoid nephrotoxins  Continue renal diet  Day team to consider nephrology consult as the patient has GFR of 7 and will get contrast during cath.    Diabetes mellitus type 2, controlled, with complications (HCC)- (present on admission)  Assessment & Plan  Patient has history of type 2 diabetes mellitus  At home he is on insulin Lantus 36 units every evening, not in any short-acting insulin.  On admission serum glucose 142  Hemoglobin A1c 5.3 in September 2019    Started him on insulin Lantus 14 units, day team to consider increasing insulin Lantus if blood sugars are not controlled.  On insulin short-acting sliding scale.  Continue to monitor    Gastroesophageal reflux disease- (present on admission)  Assessment & Plan  Patient has history of GERD.  He is on pantoprazole at home  We will hold medication       Essential hypertension- (present on admission)  Assessment & Plan  History of hypertension  On admission blood pressure 114/60  On furosemide and carvedilol at home  Holding home medication , continue to monitor, restart if BP is elevated      Anticipated Hospital stay:  >2 midnights        Quality Measures  Quality-Core Measures   Reviewed items::  EKG reviewed, Labs reviewed and Medications reviewed  Tomas catheter::  No Tomas  DVT prophylaxis pharmacological::  Heparin    PCP: Patito Edgar M.D.

## 2019-11-12 NOTE — PROGRESS NOTES
IR RN Procedural Note:    Consent obtained by Dr. Downing from patient himself, all questions answered and patient verbalized understanding. Consent form signed and witnessed, placed in chart.    Right tunneled dialysis catheter placement performed by Dr. Downing, assisted by RT Benito. Placed a 14.5F Hemosplit long-term hemodialysis catheter, Ref [id number], Lot [id number], Exp Date 05/31/2021.     Right chest catheter dressed with biopatch and tegaderm, dressing CDI.    Pt tolerated procedure well, see flowsheet for vitals documentation. Pt drowsy post procedure, but arousable to voice. O2 sats 95%  on Ra.    Report given to Soto Ernst RN and Dialysis charge RN. Pt transported to Dialysis by JAN Michele and care transferred to receiving RN.

## 2019-11-12 NOTE — CONSULTS
[facility] Nephrology Consult Note    Pt seen and examined, please see my dictated consult note for full details.   73yoM with PMH significant for CKD Stage V, HTN, DM II, CAD s/p stent, anemia secondary to CKD, CKD Bone Mineral Disorder/Renal Osteodystrophy, Hyperthyroidism treated with methimazole, admitted with chest discomfort and SOB x4 days and found to have NSTEMI.  Assessment:  1. PAT on CKD Stage V--progression of his CKD  --I had a long talk with patient and he has been reluctant to start HD in the past but he is now agreeable  2. NSTEMI--medical management for now per cardiology  3. Metabolic Acidosis--severe  --Start bicarb drip for now, D5W with 150 meq bicarb at 125cc/hr  --Will start HD once line placed  4. HTN--BP stable  5. DM II--per primary svc  6. Renal Osteodystrophy/CKD Bone Mineral Disorder  7. Hyperthyroidism--treated with methimazole as outpt, need to clarify with pt if he is still on it  8. Anemia--secondary to CKD  9. Moderate Protein-Calorie Malnutrition    Plan:  --Pt agreeable to initiating HD  --Will ask IR to place a tunneled dialysis catheter and initiate HD  --Change bicarb drip to D5W with 150 meq sodium bicarb at 125cc/hr until dialysis initiated  --Cardiology following for NSTEMI  --Check iron studies  --Transfuse PRN for hgb less than 7  --Will need outpt HD arrangements  --Check phos level    **Discussed with RN

## 2019-11-12 NOTE — ED NOTES
Med rec complete at [facility] today by [employer] rec tech prior to transfer.

## 2019-11-12 NOTE — RESPIRATORY CARE
COPD EDUCATION by COPD CLINICAL EDUCATOR  11/12/2019 at 7:17 AM by Yecenia Aranda     Patient reviewed by COPD education team. Patient does not have a history or diagnosis of COPD and is a non-smoker, therefore does not qualify for the COPD program.

## 2019-11-12 NOTE — OR SURGEON
Immediate Post- Operative Note        PostOp Diagnosis: RENAL FAILURE    Procedure(s): RIJ TUNNELLED HD CATH    Estimated Blood Loss: Less than 5 ml        Complications: None            11/12/2019     1:48 PM     Jewel Downing

## 2019-11-12 NOTE — CARE PLAN
Problem: Pain Management  Goal: Pain level will decrease to patient's comfort goal  Outcome: PROGRESSING AS EXPECTED  Note:   Mild chestpain rates 2/10 non radiating. Education provided on nitroglycerin and to notify nurse is chest pain increases.      Problem: Knowledge Deficit  Goal: Knowledge of the prescribed therapeutic regimen will improve  Outcome: PROGRESSING AS EXPECTED  Note:   Patient started on a heparin gtt. Education on bleeding precautions, ptt lab draws reviewed. Plan for cath lab in am. NPO after mid night.      Problem: Safety  Goal: Will remain free from injury  Outcome: PROGRESSING AS EXPECTED

## 2019-11-12 NOTE — ASSESSMENT & PLAN NOTE
Cath done on 11/16 - patent coronary stents proximal to distal, left anterior descending artery. No stents were done. Cardiology (Dr. Osborn) recommended F/U with EKG and maximal medical therapy.  -Continue nitroglycerin prn for chest pain  -Continue atorvastatin and Plavix   -Discontinued aspirin  -Patient is started on Eliquis twice daily 5 mg  -patient is educated and understands that he has to stop Eliquis at least 2 days prior to AVF procedure   Follow up with outpatient  Cardiology

## 2019-11-12 NOTE — CONSULTS
Cardiology consult    Requested by Dr. Elizabet Mendiola      REASON FOR CONSULT: Chest pain elevated troponins    HPI: 73-year-old white male with end-stage renal disease followed by nephrology.  States he still makes urine.  4-day history of chest discomfort and shortness of breath.  Currently pain-free.  Previous stenting done at [facility] approximately 7 years ago with Dr. Simms.  Has hypertension.  On dual antiplatelet.  States he does not smoke denies alcohol use.  Patient is  but lives with his ex-wife.  Has diabetes.  Worsening end-stage renal disease previous placement of an AV fistula right arm that failed. Initial troponin 829.        MEDICATIONS:      Current Facility-Administered Medications:   •  atorvastatin (LIPITOR) tablet 20 mg, 20 mg, Oral, Q EVENING, James Vivas M.D.  •  [START ON 11/12/2019] aspirin EC (ECOTRIN) tablet 81 mg, 81 mg, Oral, DAILY, James Vivas M.D.  •  aspirin (ASA) chewable tab 324 mg, 324 mg, Oral, Once, Elizabet Mendiola D.O.  •  MD Alert...HEPARIN WEIGHT BASED PROTOCOL Pharmacist to implement, , Other, PRN, James Vivas M.D.    Current Outpatient Medications:   •  diphenhydrAMINE (BENADRYL) 25 MG Tab, Take 50 mg by mouth at bedtime as needed., Disp: , Rfl:   •  furosemide (LASIX) 40 MG Tab, Take 40 mg by mouth as needed., Disp: , Rfl:   •  acetaminophen (TYLENOL) 500 MG Tab, Take 500-1,000 mg by mouth every 6 hours as needed for Moderate Pain., Disp: , Rfl:   •  carvedilol (COREG) 6.25 MG Tab, Take 6.25 mg by mouth 2 times a day, with meals., Disp: , Rfl:   •  sodium bicarbonate (SODIUM BICARBONATE) 650 MG Tab, Take 650 mg by mouth 2 times a day., Disp: , Rfl:   •  LANTUS SOLOSTAR 100 UNIT/ML Solution Pen-injector injection, 36 Units by Intramuscular route every evening., Disp: , Rfl:   •  pantoprazole (PROTONIX) 40 MG Tablet Delayed Response, Take 40 mg by mouth 1 time daily as needed., Disp: , Rfl:   •  aspirin (ASA) 81 MG CHEW, Take 81 mg by  mouth every day., Disp: , Rfl:   •  prasugrel (EFFIENT) 10 MG TABS, Take 10 mg by mouth every day., Disp: , Rfl:     ALLERGIES:   Mr. Sepulveda  has No Known Allergies.     SURGERIES:  Mr. Sepulveda   has a past surgical history that includes other cardiac surgery; appendectomy; and other orthopedic surgery.     MEDICAL ILLNESSES:  Mr. Sepulveda   has a past medical history of CAD (coronary artery disease), Chronic kidney disease (CKD), stage V (HCC), Diabetes, Hypertension, Osteoarthritis, and Peripheral neuropathy.     SOCIAL HISTORY:  Mr. Sepulveda   reports that he quit smoking about 5 years ago. He has a 55.00 pack-year smoking history. He has never used smokeless tobacco. He reports that he does not drink alcohol or use drugs.     FAMILY HISTORY:  Mr.'s Sepulveda  family history includes Alcohol/Drug in his father; Diabetes in his brother; Heart Disease in his mother; Thyroid in his son.      ROS:  ROS  In addition to the above, a complete review of system was performed and all other organs systems were normal    PHYSICAL EXAM:  Physical Exam   Constitutional: He is oriented to person, place, and time.   Thin and weak   HENT:   Head: Normocephalic and atraumatic.   Mouth/Throat: Oropharynx is clear and moist.   Eyes: Pupils are equal, round, and reactive to light. EOM are normal.   Neck: Normal range of motion. Neck supple. No thyromegaly present.   Cardiovascular: Normal rate, regular rhythm and intact distal pulses. Exam reveals gallop. Exam reveals no friction rub.   Murmur heard.  Pulmonary/Chest: Effort normal and breath sounds normal.   Abdominal: Soft. Bowel sounds are normal.   Musculoskeletal: Normal range of motion.         General: No tenderness or edema.   Neurological: He is alert and oriented to person, place, and time.   Skin: Skin is warm and dry. No rash noted.   Psychiatric: Mood, memory, affect and judgment normal.       /51   Pulse (!) 105   Temp 37.2 °C (98.9 °F) (Temporal)   Resp 18   " Ht 1.651 m (5' 5\")   Wt 70.8 kg (156 lb 1.4 oz)   SpO2 97%   BMI 25.97 kg/m²      Lab Results   Component Value Date/Time    CHOLSTRLTOT 133 2019 11:56 AM    LDL 61 2019 11:56 AM    HDL 26 (A) 2019 11:56 AM    TRIGLYCERIDE 232 (H) 2019 11:56 AM       Lab Results   Component Value Date/Time    SODIUM 137 2019 04:50 PM    POTASSIUM 4.2 2019 04:50 PM    CHLORIDE 111 2019 04:50 PM    CO2 7 (LL) 2019 04:50 PM    GLUCOSE 142 (H) 2019 04:50 PM     (HH) 2019 04:50 PM    CREATININE 7.23 (HH) 2019 04:50 PM     Lab Results   Component Value Date/Time    ALKPHOSPHAT 179 (H) 2019 04:50 PM    ASTSGOT 8 (L) 2019 04:50 PM    ALTSGPT 18 2019 04:50 PM    TBILIRUBIN 0.2 2019 04:50 PM      Lab Results   Component Value Date/Time    BNPBTYPENAT 84 2018 02:08 PM         Recent Labs     19  1650   WBC 7.6   RBC 2.76*   HEMOGLOBIN 8.4*   HEMATOCRIT 24.6*   MCV 89.1   MCH 30.4   MCHC 34.1   RDW 46.7   PLATELETCT 242   MPV 8.9*         EKG: Sinus rhythm with right bundle branch block and left anterior hemiblock.  ST-T wave changes with depression.  Minimal change from previous EKGs except for the depression.  Results for orders placed or performed during the hospital encounter of 19   EKG   Result Value Ref Range    Report       [facility] Emergency Dept[facility]    Test Date:  2019  Pt Name:    KLARISSA BRADSHAW              Department: [facility]  MRN:        [id number]                      Room:       -ROOM 2  Gender:     Male                         Technician: DELTA  :        1945                   Requested By:ER TRIAGE PROTOCOL  Order #:    [id number]                    Reading MD: RAJ ARROYO MD    Measurements  Intervals                                Axis  Rate:       106                          P:          96  UT:         153                          QRS:        -69  QRSD:       148         "                  T:          98  QT:         388  QTc:        516    Interpretive Statements  Sinus tachycardia  RBBB and LAFB  Abnormal T, consider ischemia, lateral leads  Compared to ECG 06/07/2018 13:55:25  T-wave abnormality now present  Possible ischemia now present  Sinus rhythm no longer present  Electronically Signed On 11- 17:12:45 PST by RAJ ARROYO MD         IMAGING:   EC-ECHOCARDIOGRAM COMPLETE W/O CONT    (Results Pending)         Patient Active Problem List    Diagnosis Date Noted   • Polyarthritis 06/25/2018     Priority: High   • Hyperthyroidism 06/25/2018     Priority: Medium   • Coronary artery disease involving native coronary artery of native heart without angina pectoris 06/25/2018     Priority: Low   • Diabetes mellitus type 2, controlled, with complications (HCC) 08/13/2019   • Chronic kidney disease (CKD), stage V ([facility]) 08/13/2019   • Dyslipidemia 04/11/2019   • Thyroid nodule 04/11/2019   • Essential hypertension 08/15/2018   • Gastroesophageal reflux disease 08/15/2018   • Anemia of chronic disease 08/15/2018   • Health care maintenance 08/15/2018   • Hepatic cyst 08/02/2018   • Renal cyst 08/02/2018         ASSESSMENT AND PLAN:   1.  Acute coronary syndrome.  Admit serial troponin serial electrocardiograms.  Check echocardiogram.  Has an S3 possibly has LV dysfunction.  Start heparin, continue dual antiplatelets.  2.  End-stage renal disease worsening.  3.  Previous history of stenting at [facility].  Get old records.  4.  Failure to thrive.  5.  Anemia of chronic disease and renal failure.

## 2019-11-12 NOTE — ASSESSMENT & PLAN NOTE
Patient has history of type 2 diabetes mellitus  At home he is on insulin Lantus 36 units every evening, not in any short-acting insulin.  Hemoglobin A1c 5.3 in September 2019  Currently since he started on dialysis , his Blood glucose readings has been in the lower side , dropping to 60s with lantus     Continue  insulin with reduced dose of Lantus to 5 units   Discontinue the SSI  Hypoglycemia protocol  Continue to monitor  Patient is informed that he needs to check  morning fasting blood sugars regularly at home   Follow up with the primary care for Insulin dosage and blood glucose management

## 2019-11-12 NOTE — ASSESSMENT & PLAN NOTE
Patient has history of chronic kidney disease stage V - ESRD  Patient started on HD on 11/12/19 with tunnel cath in place  Has outpatient HD chair set with Katrina PALMA - for TTS but yet to be confirmed  TB quant neg   Renal dose all meds     Plan:  We will discharge the patient as outpatient dialysis is confirmed   Follow up with Nephrology as outpatient

## 2019-11-12 NOTE — ED NOTES
1645 Assessment done  1650 NSL initiated Blood to lab  1655 ERP at bedside  1700 P. Chest x-ray done POC discussed Awaiting lab results and CT

## 2019-11-12 NOTE — ED TRIAGE NOTES
Pt to triage after EKG    Chief Complaint   Patient presents with   • Shortness of Breath   • Hoarse   • Weakness   • Loss of Appetite   • Cough   • Rash   symptoms x weeks, worsening  Ex wife at bedside  Sepsis score 3  Pt reports productive grey cough  Pt straight back to room with tech

## 2019-11-12 NOTE — ASSESSMENT & PLAN NOTE
History of hypertension  On admission blood pressure 114/60  Holding home medication , continue to monitor, restart if BP is elevated

## 2019-11-12 NOTE — PROGRESS NOTES
"Report received from Julia MONTOYA in ED. Patient transferred to room 827-1. [place] to room and call bell system. Aox4. Sob with exertion. Very anxious, hands shaking. States \"cold\". Warm blankets provided. VSS. NSR on tele with bbb. Denies pain. Except for toenail to left foot taped  By nursing at [facility]. Nail was coming off . Right foot pale. Poor perfusion. Unsteady gait. Bed alarmed, call bell in reach. Hourly rounding implemented. Son at bedside supportive.   EKG tech at bedside.   "

## 2019-11-12 NOTE — ED PROVIDER NOTES
ED Provider Note     Scribed for Elizabet Mendiola D.O. by Waylon Nicole. 11/11/2019, 7:30 PM.     Primary care provider: Patito Edgar M.D.  Means of arrival: EMS         History obtained from: patient  History limited by: none    CHIEF COMPLAINT  Chief Complaint   Patient presents with   • Shortness of Breath   • Chest Pain       HPI  Luis Sepulveda is a 73 y.o. male who presents to the emergency Department as a transfer from [facility] for intermittent chest pain and shortness of breath onset 3 weeks ago.  He had laboratories done at [facility] and had an elevated troponin.  They also did a sepsis work-up on him and he received antibiotics at that time but no aspirin.  He denies any chest pain at this time, but notes that he still feels short of breath and nauseous. The patient states that his symptoms typically resolve on their own. No exacerbating or alleviating factors noted. He had stents put in by Dr. Mann a few years ago.  He currently states that his chest pain is similar to that preceding the stents that were placed.  The patient has a history of chronic renal failure, but denies being on dialysis at this time.  He states they have been attempting to do AV fistulas on him but have not completed that.     REVIEW OF SYSTEMS  Pertinent positives include shortness of breath, nausea. Pertinent negatives include no chest pain at this time.   See HPI for further details. All other systems are negative.    PAST MEDICAL HISTORY  Past Medical History:   Diagnosis Date   • CAD (coronary artery disease)     stents   • Chronic kidney disease (CKD), stage V (HCC)    • Diabetes    • Hypertension    • Osteoarthritis    • Peripheral neuropathy        FAMILY HISTORY  Family History   Problem Relation Age of Onset   • Heart Disease Mother    • Alcohol/Drug Father    • Thyroid Son    • Diabetes Brother    • Hypertension Neg Hx    • Hyperlipidemia Neg Hx        SOCIAL HISTORY  Social History     Tobacco  "Use   • Smoking status: Former Smoker     Packs/day: 1.00     Years: 55.00     Pack years: 55.00     Last attempt to quit: 2014     Years since quittin.8   • Smokeless tobacco: Never Used   Substance Use Topics   • Alcohol use: No   • Drug use: No      Social History     Substance and Sexual Activity   Drug Use No       SURGICAL HISTORY  Past Surgical History:   Procedure Laterality Date   • APPENDECTOMY     • OTHER CARDIAC SURGERY      stents   • OTHER ORTHOPEDIC SURGERY      knee surgery       CURRENT MEDICATIONS  Current Outpatient Medications:   •  diphenhydrAMINE (BENADRYL) 25 MG Tab, Take 50 mg by mouth at bedtime as needed., Disp: , Rfl:   •  furosemide (LASIX) 40 MG Tab, Take 40 mg by mouth as needed., Disp: , Rfl:   •  acetaminophen (TYLENOL) 500 MG Tab, Take 500-1,000 mg by mouth every 6 hours as needed for Moderate Pain., Disp: , Rfl:   •  carvedilol (COREG) 6.25 MG Tab, Take 6.25 mg by mouth 2 times a day, with meals., Disp: , Rfl:   •  sodium bicarbonate (SODIUM BICARBONATE) 650 MG Tab, Take 650 mg by mouth 2 times a day., Disp: , Rfl:   •  LANTUS SOLOSTAR 100 UNIT/ML Solution Pen-injector injection, 36 Units by Intramuscular route every evening., Disp: , Rfl:   •  pantoprazole (PROTONIX) 40 MG Tablet Delayed Response, Take 40 mg by mouth 1 time daily as needed., Disp: , Rfl:   •  aspirin (ASA) 81 MG CHEW, Take 81 mg by mouth every day., Disp: , Rfl:   •  prasugrel (EFFIENT) 10 MG TABS, Take 10 mg by mouth every day., Disp: , Rfl:     ALLERGIES  No Known Allergies    PHYSICAL EXAM  VITAL SIGNS: /51   Pulse (!) 105   Temp 37.2 °C (98.9 °F) (Temporal)   Resp 18   Ht 1.651 m (5' 5\")   Wt 70.8 kg (156 lb 1.4 oz)   SpO2 97%   BMI 25.97 kg/m²     Constitutional: Patient is well developed, well nourished. Non-toxic appearing. No acute distress.  HENT: Normocephalic, atraumatic. Ketotic breath pupils are reactive to light extra ocular muscles are intact oral mucosa is moist.  Tongue and " uvular midline.  Cardiovascular: Normal heart rate and Regular rhythm. No murmur  Thorax & Lungs: Clear and equal breath sounds with good excursion. No respiratory distress.  No rhonchi or wheezing, or rales.  Abdomen: Soft, nontender, no rebound, guarding, pulsatile masses.  Skin: Pale, no rashes  Extremities: Peripheral pulses 4/4 No edema  Musculoskeletal: Normal range of motion in all major joints.  Neurologic: Alert & oriented x 3, no motor or sensory deficits.  He has normal range of motion with normal dorsi and plantar flexion.  Psychiatric: Affect normal, Judgment normal, Mood normal.       DIAGNOSTICS/PROCEDURES    LABS  Results for orders placed or performed during the hospital encounter of 11/11/19   COMP METABOLIC PANEL   Result Value Ref Range    Sodium 141 135 - 145 mmol/L    Potassium 3.7 3.6 - 5.5 mmol/L    Chloride 116 (H) 96 - 112 mmol/L    Co2 7 (LL) 20 - 33 mmol/L    Anion Gap 18.0 (H) 0.0 - 11.9    Glucose 89 65 - 99 mg/dL    Bun 125 (HH) 8 - 22 mg/dL    Creatinine 6.58 (HH) 0.50 - 1.40 mg/dL    Calcium 8.5 8.5 - 10.5 mg/dL    AST(SGOT) 7 (L) 12 - 45 U/L    ALT(SGPT) 16 2 - 50 U/L    Alkaline Phosphatase 157 (H) 30 - 99 U/L    Total Bilirubin 0.3 0.1 - 1.5 mg/dL    Albumin 3.6 3.2 - 4.9 g/dL    Total Protein 6.4 6.0 - 8.2 g/dL    Globulin 2.8 1.9 - 3.5 g/dL    A-G Ratio 1.3 g/dL   CBC WITH DIFFERENTIAL   Result Value Ref Range    WBC 8.5 4.8 - 10.8 K/uL    RBC 2.61 (L) 4.70 - 6.10 M/uL    Hemoglobin 8.1 (L) 14.0 - 18.0 g/dL    Hematocrit 24.2 (L) 42.0 - 52.0 %    MCV 92.7 81.4 - 97.8 fL    MCH 31.0 27.0 - 33.0 pg    MCHC 33.5 (L) 33.7 - 35.3 g/dL    RDW 49.2 35.9 - 50.0 fL    Platelet Count 252 164 - 446 K/uL    MPV 9.1 9.0 - 12.9 fL    Neutrophils-Polys 76.60 (H) 44.00 - 72.00 %    Lymphocytes 10.00 (L) 22.00 - 41.00 %    Monocytes 12.60 0.00 - 13.40 %    Eosinophils 0.10 0.00 - 6.90 %    Basophils 0.20 0.00 - 1.80 %    Immature Granulocytes 0.50 0.00 - 0.90 %    Nucleated RBC 0.00 /100 WBC     Neutrophils (Absolute) 6.49 1.82 - 7.42 K/uL    Lymphs (Absolute) 0.85 (L) 1.00 - 4.80 K/uL    Monos (Absolute) 1.07 (H) 0.00 - 0.85 K/uL    Eos (Absolute) 0.01 0.00 - 0.51 K/uL    Baso (Absolute) 0.02 0.00 - 0.12 K/uL    Immature Granulocytes (abs) 0.04 0.00 - 0.11 K/uL    NRBC (Absolute) 0.00 K/uL   ESTIMATED GFR   Result Value Ref Range    GFR If African American 10 (A) >60 mL/min/1.73 m 2    GFR If Non  8 (A) >60 mL/min/1.73 m 2   EKG (NOW)   Result Value Ref Range    Report       [facility]    Test Date:  2019  Pt Name:    KLARISSA BRADSHAW              Department: ER  MRN:        [id number]                      Room:       [facility]  Gender:     Male                         Technician: [facility]  :        1945                   Requested By:VISHNU PIERSON  Order #:    [id number]                    Reading MD:    Measurements  Intervals                                Axis  Rate:       109                          P:          61  CO:         129                          QRS:        -59  QRSD:       144                          T:          97  QT:         369  QTc:        498    Interpretive Statements  SINUS TACHYCARDIA  RBBB AND LAFB  ABNORMAL T, CONSIDER ISCHEMIA, LATERAL LEADS  Compared to ECG 2019 16:09:38  No significant changes           Labs reviewed by me    EKG  12 lead EKG interpreted by me as above.      RADIOLOGY/PROCEDURES  IR-LURDES SAUL PLACEMENT >5   Final Result      1. ULTRASOUND AND FLUOROSCOPIC GUIDED PLACEMENT OF A Right INTERNAL JUGULAR 14.5 [place] HemoSplit TUNNELED DIALYSIS CATHETER.      2. THE HEMODIALYSIS CATHETER MAY BE USED IMMEDIATELY AS CLINICALLY INDICATED. FLUSHES PER PROTOCOL.         EC-ECHOCARDIOGRAM COMPLETE W/O CONT             Results and radiologist interpretation reviewed by me.     COURSE & MEDICAL DECISION MAKING  Pertinent Labs & Imaging studies reviewed. (See chart for details)    7:30 PM - Patient seen and evaluated at bedside. Ordered  for EKG, CBC w/, CMP, troponin to evaluate. Patient will be treated with  mg for his symptoms. Differential diagnoses include, but are not limited to unstable angina, non-STEMI, chronic kidney disease.  Lab revealed normal WBC's , decreased hemoglobin and hematocrit and markedly elevated BUN and Creatinine 125/6.58  His troponin was elevated at [facility]      7:36 PM Paged for [facility] Internal Medicine and Cardiology at this time.    7:42 PM - Spoke with Dr. Vivas, Cardiology, about the patient's condition. I informed him that the patient has been transferred to this facility from [facility]. He agrees with the plan for admission and states that he will see the patient     7:47 PM - Spoke with Dr. Barry, [facility] Internal Medicine, about the patient's condition  They agree to admit the patient.       DISPOSITION:  Patient will be admitted to Dr. Barry in guarded condition.      FINAL IMPRESSION  1. Elevated troponin    2. Acute chest pain    3. Shortness of breath    4. CKD (chronic kidney disease) stage V requiring chronic dialysis (HCC)    5. History of  Coronary Artery Disease     IWaylon ([place]), am scribing for, and in the presence of, Elizabet Mendiola D.O..    Electronically signed by: Waylon Nicole ([place]), 11/11/2019    IElizabet D.O. personally performed the services described in this documentation, as scribed by Waylon Nicole in my presence, and it is both accurate and complete. C    The note accurately reflects work and decisions made by me.  Elizabet Mendiola  11/12/2019  5:32 PM

## 2019-11-12 NOTE — SENIOR ADMIT NOTE
"SENIOR ADMIT NOTE:    Vidal Barry  Date & Time note created:    11/11/2019   11:53 PM       Chief Complaint:  Chest pain    History of Present Illness:    The patient is a 73 year old male who presented with chest pain that was ongoing for 3 weeks. The patient states that walking from his living room to his bedroom would cause him to have weakness and chest pain. This was usually relieved with resting. The patient's pain is substernal and is associated with shortness of breath. The patient came in because he felt it was worsening.    The patient also has a history of CKD stage 5, currently not on dialysis and the patient is still making urine. He previously had an AV fistula placed in his right arm that failed. The patient also has a history of hypertension, diabetes, and prior cardiac stents that were placed several years ago at [facility] by Dr. Simms.     In the ED, the patient's initial troponin was 829. His EKG showed sinus tachycardia with a RBBB, LAFB, some ST depressions and abnormal t-waves. The patient was seen by cardiology who at this time wanted to admit to medicine. The patient was started on a heparin drip.     Physical Exam:  Weight/BMI: Body mass index is 25.79 kg/m².  /60   Pulse 97   Temp 37.2 °C (99 °F) (Temporal)   Resp 18   Ht 1.651 m (5' 5\")   Wt 70.3 kg (154 lb 15.7 oz)   SpO2 100%   Vitals:    11/11/19 1922 11/11/19 1924 11/11/19 2100 11/11/19 2200   BP:  118/51 114/60 114/60   Pulse:  (!) 105 (!) 107 97   Resp:  18  18   Temp:  37.2 °C (98.9 °F) 37.2 °C (99 °F) 37.2 °C (99 °F)   TempSrc:  Temporal Temporal Temporal   SpO2:  97% 100% 100%   Weight: 70.8 kg (156 lb 1.4 oz)   70.3 kg (154 lb 15.7 oz)   Height: 1.651 m (5' 5\")   1.651 m (5' 5\")     Oxygen Therapy:  Pulse Oximetry: 100 %, O2 Delivery: None (Room Air)    Physical exam:  Constitutional:  No acute distress. A&O x3  HENMT:  Normocephalic, Atraumatic  Eyes:  PERRLA, EOMI, Conjunctiva normal  Neck:  Supple, Full range of " motion, No stridor  Cardiovascular:  Regular rate and rhythm, No murmurs, No rubs, No gallops.   Lungs: Respiratory effort is normal, no crackles, no wheezing.  Extremities: Good pedal pulses b/l, no edema, 5/5 strength.  Abdomen: Bowel sounds x4, Soft, Non-tender, Non-distended, No guarding, No rebound, No masses.  Neurologic: Good sensations, Cranial nerves II through XII grossly intact. No focal deficits noted.    Imaging  EC-ECHOCARDIOGRAM COMPLETE W/O CONT    (Results Pending)     ASSESSMENT/PLAN:  # Acute Coronary syndrome  - this is likely ACS given that his chest pain is very typical, he had elevated troponins (even in the setting of ESRD, they are still very high), and some EKG changes.   - The patient was seen by cardiology and recommend heparin drip  - Echo in the AM  - Continue to monitor on telemetry for any rhythm abnormalities  - Aspirin, statin, continue diabetes management  - Per cardiology, continue DAPT  - Metoprolol 12.5mg BID  - Cardiology is following  - Continue trending troponin and EKG x2  - Nitro PRN for chest pain    # CDK stage 5  - The patient is not dependent on dialysis at this time. He is still making urine.  - The patient is not too keen on starting dialysis at this time, and given that he will be getting contrast with a cath, this is an ongoing discussion with the patient about his wishes. The patient would like to be managed medically at this time, but this discussion will need to be revisited in the AM.   - Day team to consider Nephrology consult in AM for ESRD with GFR of 7.     For full details please refer to H&P done by Dr. Chris Barry M.D.

## 2019-11-13 LAB
ABO + RH BLD: NORMAL
ABO GROUP BLD: NORMAL
ALBUMIN SERPL BCP-MCNC: 3.2 G/DL (ref 3.2–4.9)
ALBUMIN/GLOB SERPL: 1.5 G/DL
ALP SERPL-CCNC: 142 U/L (ref 30–99)
ALT SERPL-CCNC: 12 U/L (ref 2–50)
ANION GAP SERPL CALC-SCNC: 11 MMOL/L (ref 0–11.9)
ANION GAP SERPL CALC-SCNC: 15 MMOL/L (ref 0–11.9)
APTT PPP: 94.3 SEC (ref 24.7–36)
AST SERPL-CCNC: 13 U/L (ref 12–45)
BARCODED ABORH UBTYP: 8400
BARCODED PRD CODE UBPRD: NORMAL
BARCODED UNIT NUM UBUNT: NORMAL
BASOPHILS # BLD AUTO: 0.2 % (ref 0–1.8)
BASOPHILS # BLD: 0.01 K/UL (ref 0–0.12)
BILIRUB SERPL-MCNC: 0.4 MG/DL (ref 0.1–1.5)
BLD GP AB SCN SERPL QL: NORMAL
BUN SERPL-MCNC: 77 MG/DL (ref 8–22)
BUN SERPL-MCNC: 79 MG/DL (ref 8–22)
CALCIUM SERPL-MCNC: 7.3 MG/DL (ref 8.5–10.5)
CALCIUM SERPL-MCNC: 7.8 MG/DL (ref 8.5–10.5)
CHLORIDE SERPL-SCNC: 109 MMOL/L (ref 96–112)
CHLORIDE SERPL-SCNC: 109 MMOL/L (ref 96–112)
CO2 SERPL-SCNC: 19 MMOL/L (ref 20–33)
CO2 SERPL-SCNC: 24 MMOL/L (ref 20–33)
COMPONENT R 8504R: NORMAL
CREAT SERPL-MCNC: 4.27 MG/DL (ref 0.5–1.4)
CREAT SERPL-MCNC: 4.74 MG/DL (ref 0.5–1.4)
EKG IMPRESSION: NORMAL
EOSINOPHIL # BLD AUTO: 0.02 K/UL (ref 0–0.51)
EOSINOPHIL NFR BLD: 0.3 % (ref 0–6.9)
ERYTHROCYTE [DISTWIDTH] IN BLOOD BY AUTOMATED COUNT: 43.7 FL (ref 35.9–50)
GLOBULIN SER CALC-MCNC: 2.1 G/DL (ref 1.9–3.5)
GLUCOSE BLD-MCNC: 112 MG/DL (ref 65–99)
GLUCOSE BLD-MCNC: 140 MG/DL (ref 65–99)
GLUCOSE BLD-MCNC: 149 MG/DL (ref 65–99)
GLUCOSE BLD-MCNC: 238 MG/DL (ref 65–99)
GLUCOSE SERPL-MCNC: 134 MG/DL (ref 65–99)
GLUCOSE SERPL-MCNC: 87 MG/DL (ref 65–99)
HCT VFR BLD AUTO: 19.5 % (ref 42–52)
HGB BLD-MCNC: 6.8 G/DL (ref 14–18)
HGB BLD-MCNC: 8.6 G/DL (ref 14–18)
IMM GRANULOCYTES # BLD AUTO: 0.03 K/UL (ref 0–0.11)
IMM GRANULOCYTES NFR BLD AUTO: 0.5 % (ref 0–0.9)
LYMPHOCYTES # BLD AUTO: 0.76 K/UL (ref 1–4.8)
LYMPHOCYTES NFR BLD: 12.2 % (ref 22–41)
MAGNESIUM SERPL-MCNC: 1.6 MG/DL (ref 1.5–2.5)
MCH RBC QN AUTO: 30.8 PG (ref 27–33)
MCHC RBC AUTO-ENTMCNC: 34.9 G/DL (ref 33.7–35.3)
MCV RBC AUTO: 88.2 FL (ref 81.4–97.8)
MONOCYTES # BLD AUTO: 0.93 K/UL (ref 0–0.85)
MONOCYTES NFR BLD AUTO: 14.9 % (ref 0–13.4)
NEUTROPHILS # BLD AUTO: 4.5 K/UL (ref 1.82–7.42)
NEUTROPHILS NFR BLD: 71.9 % (ref 44–72)
NRBC # BLD AUTO: 0.02 K/UL
NRBC BLD-RTO: 0.3 /100 WBC
PLATELET # BLD AUTO: 207 K/UL (ref 164–446)
PMV BLD AUTO: 9.2 FL (ref 9–12.9)
POTASSIUM SERPL-SCNC: 2.8 MMOL/L (ref 3.6–5.5)
POTASSIUM SERPL-SCNC: 3.2 MMOL/L (ref 3.6–5.5)
PRODUCT TYPE UPROD: NORMAL
PROT SERPL-MCNC: 5.3 G/DL (ref 6–8.2)
RBC # BLD AUTO: 2.21 M/UL (ref 4.7–6.1)
RH BLD: NORMAL
SODIUM SERPL-SCNC: 143 MMOL/L (ref 135–145)
SODIUM SERPL-SCNC: 144 MMOL/L (ref 135–145)
T4 FREE SERPL-MCNC: 4.16 NG/DL (ref 0.53–1.43)
TSH SERPL DL<=0.005 MIU/L-ACNC: <0.005 UIU/ML (ref 0.38–5.33)
UNIT STATUS USTAT: NORMAL
WBC # BLD AUTO: 6.3 K/UL (ref 4.8–10.8)

## 2019-11-13 PROCEDURE — 30233N1 TRANSFUSION OF NONAUTOLOGOUS RED BLOOD CELLS INTO PERIPHERAL VEIN, PERCUTANEOUS APPROACH: ICD-10-PCS | Performed by: INTERNAL MEDICINE

## 2019-11-13 PROCEDURE — 700111 HCHG RX REV CODE 636 W/ 250 OVERRIDE (IP)

## 2019-11-13 PROCEDURE — 86901 BLOOD TYPING SEROLOGIC RH(D): CPT

## 2019-11-13 PROCEDURE — 700102 HCHG RX REV CODE 250 W/ 637 OVERRIDE(OP): Performed by: INTERNAL MEDICINE

## 2019-11-13 PROCEDURE — A9270 NON-COVERED ITEM OR SERVICE: HCPCS | Performed by: INTERNAL MEDICINE

## 2019-11-13 PROCEDURE — 85730 THROMBOPLASTIN TIME PARTIAL: CPT

## 2019-11-13 PROCEDURE — 700102 HCHG RX REV CODE 250 W/ 637 OVERRIDE(OP): Performed by: STUDENT IN AN ORGANIZED HEALTH CARE EDUCATION/TRAINING PROGRAM

## 2019-11-13 PROCEDURE — 700105 HCHG RX REV CODE 258: Performed by: STUDENT IN AN ORGANIZED HEALTH CARE EDUCATION/TRAINING PROGRAM

## 2019-11-13 PROCEDURE — 92610 EVALUATE SWALLOWING FUNCTION: CPT

## 2019-11-13 PROCEDURE — 86850 RBC ANTIBODY SCREEN: CPT

## 2019-11-13 PROCEDURE — 93010 ELECTROCARDIOGRAM REPORT: CPT | Mod: 76 | Performed by: INTERNAL MEDICINE

## 2019-11-13 PROCEDURE — 700111 HCHG RX REV CODE 636 W/ 250 OVERRIDE (IP): Performed by: STUDENT IN AN ORGANIZED HEALTH CARE EDUCATION/TRAINING PROGRAM

## 2019-11-13 PROCEDURE — 93010 ELECTROCARDIOGRAM REPORT: CPT | Performed by: INTERNAL MEDICINE

## 2019-11-13 PROCEDURE — 86923 COMPATIBILITY TEST ELECTRIC: CPT

## 2019-11-13 PROCEDURE — 84439 ASSAY OF FREE THYROXINE: CPT

## 2019-11-13 PROCEDURE — A9270 NON-COVERED ITEM OR SERVICE: HCPCS | Performed by: STUDENT IN AN ORGANIZED HEALTH CARE EDUCATION/TRAINING PROGRAM

## 2019-11-13 PROCEDURE — 5A1D70Z PERFORMANCE OF URINARY FILTRATION, INTERMITTENT, LESS THAN 6 HOURS PER DAY: ICD-10-PCS | Performed by: INTERNAL MEDICINE

## 2019-11-13 PROCEDURE — 90935 HEMODIALYSIS ONE EVALUATION: CPT

## 2019-11-13 PROCEDURE — 83970 ASSAY OF PARATHORMONE: CPT

## 2019-11-13 PROCEDURE — 36415 COLL VENOUS BLD VENIPUNCTURE: CPT

## 2019-11-13 PROCEDURE — 85025 COMPLETE CBC W/AUTO DIFF WBC: CPT

## 2019-11-13 PROCEDURE — 770020 HCHG ROOM/CARE - TELE (206)

## 2019-11-13 PROCEDURE — 86900 BLOOD TYPING SEROLOGIC ABO: CPT

## 2019-11-13 PROCEDURE — 700111 HCHG RX REV CODE 636 W/ 250 OVERRIDE (IP): Performed by: INTERNAL MEDICINE

## 2019-11-13 PROCEDURE — P9016 RBC LEUKOCYTES REDUCED: HCPCS

## 2019-11-13 PROCEDURE — 99233 SBSQ HOSP IP/OBS HIGH 50: CPT | Mod: GC | Performed by: HOSPITALIST

## 2019-11-13 PROCEDURE — 85018 HEMOGLOBIN: CPT

## 2019-11-13 PROCEDURE — 83735 ASSAY OF MAGNESIUM: CPT

## 2019-11-13 PROCEDURE — 36430 TRANSFUSION BLD/BLD COMPNT: CPT

## 2019-11-13 PROCEDURE — 80053 COMPREHEN METABOLIC PANEL: CPT

## 2019-11-13 PROCEDURE — 84443 ASSAY THYROID STIM HORMONE: CPT

## 2019-11-13 PROCEDURE — 82962 GLUCOSE BLOOD TEST: CPT

## 2019-11-13 PROCEDURE — 700105 HCHG RX REV CODE 258: Performed by: INTERNAL MEDICINE

## 2019-11-13 PROCEDURE — 99233 SBSQ HOSP IP/OBS HIGH 50: CPT | Performed by: INTERNAL MEDICINE

## 2019-11-13 PROCEDURE — 700111 HCHG RX REV CODE 636 W/ 250 OVERRIDE (IP): Performed by: PHYSICIAN ASSISTANT

## 2019-11-13 PROCEDURE — 86480 TB TEST CELL IMMUN MEASURE: CPT

## 2019-11-13 PROCEDURE — 80048 BASIC METABOLIC PNL TOTAL CA: CPT

## 2019-11-13 RX ORDER — METHIMAZOLE 5 MG/1
5 TABLET ORAL DAILY
Status: DISCONTINUED | OUTPATIENT
Start: 2019-11-13 | End: 2019-11-18 | Stop reason: HOSPADM

## 2019-11-13 RX ORDER — SODIUM CHLORIDE 9 MG/ML
INJECTION, SOLUTION INTRAVENOUS CONTINUOUS
Status: DISCONTINUED | OUTPATIENT
Start: 2019-11-13 | End: 2019-11-13

## 2019-11-13 RX ORDER — ATORVASTATIN CALCIUM 80 MG/1
80 TABLET, FILM COATED ORAL EVERY EVENING
Status: DISCONTINUED | OUTPATIENT
Start: 2019-11-13 | End: 2019-11-18 | Stop reason: HOSPADM

## 2019-11-13 RX ORDER — MAGNESIUM SULFATE HEPTAHYDRATE 40 MG/ML
2 INJECTION, SOLUTION INTRAVENOUS ONCE
Status: COMPLETED | OUTPATIENT
Start: 2019-11-13 | End: 2019-11-13

## 2019-11-13 RX ORDER — POTASSIUM CHLORIDE 20 MEQ/1
20 TABLET, EXTENDED RELEASE ORAL ONCE
Status: COMPLETED | OUTPATIENT
Start: 2019-11-13 | End: 2019-11-13

## 2019-11-13 RX ORDER — POTASSIUM CHLORIDE 20 MEQ/1
20 TABLET, EXTENDED RELEASE ORAL DAILY
Status: DISCONTINUED | OUTPATIENT
Start: 2019-11-13 | End: 2019-11-18

## 2019-11-13 RX ORDER — CALCIUM ACETATE 667 MG/1
667 TABLET ORAL
Status: DISCONTINUED | OUTPATIENT
Start: 2019-11-13 | End: 2019-11-18 | Stop reason: HOSPADM

## 2019-11-13 RX ORDER — HEPARIN SODIUM 1000 [USP'U]/ML
INJECTION, SOLUTION INTRAVENOUS; SUBCUTANEOUS
Status: COMPLETED
Start: 2019-11-13 | End: 2019-11-13

## 2019-11-13 RX ORDER — MECLIZINE HCL 12.5 MG/1
12.5 TABLET ORAL ONCE
Status: COMPLETED | OUTPATIENT
Start: 2019-11-13 | End: 2019-11-14

## 2019-11-13 RX ORDER — POTASSIUM CHLORIDE 20 MEQ/1
40 TABLET, EXTENDED RELEASE ORAL ONCE
Status: COMPLETED | OUTPATIENT
Start: 2019-11-13 | End: 2019-11-13

## 2019-11-13 RX ORDER — PROMETHAZINE HYDROCHLORIDE 25 MG/1
12.5 TABLET ORAL EVERY 6 HOURS PRN
Status: DISCONTINUED | OUTPATIENT
Start: 2019-11-13 | End: 2019-11-18 | Stop reason: HOSPADM

## 2019-11-13 RX ORDER — SODIUM CHLORIDE 9 MG/ML
INJECTION, SOLUTION INTRAVENOUS
Status: COMPLETED
Start: 2019-11-13 | End: 2019-11-13

## 2019-11-13 RX ORDER — POTASSIUM CHLORIDE 20 MEQ/1
40 TABLET, EXTENDED RELEASE ORAL ONCE
Status: DISCONTINUED | OUTPATIENT
Start: 2019-11-13 | End: 2019-11-13

## 2019-11-13 RX ADMIN — ACETAMINOPHEN 650 MG: 325 TABLET, FILM COATED ORAL at 20:20

## 2019-11-13 RX ADMIN — ATORVASTATIN CALCIUM 80 MG: 80 TABLET, FILM COATED ORAL at 17:29

## 2019-11-13 RX ADMIN — SODIUM CHLORIDE 30 ML: 9 INJECTION, SOLUTION INTRAVENOUS at 05:18

## 2019-11-13 RX ADMIN — INSULIN GLARGINE 14 UNITS: 100 INJECTION, SOLUTION SUBCUTANEOUS at 20:26

## 2019-11-13 RX ADMIN — SODIUM BICARBONATE 650 MG: 650 TABLET ORAL at 05:15

## 2019-11-13 RX ADMIN — MAGNESIUM SULFATE 2 G: 2 INJECTION INTRAVENOUS at 10:33

## 2019-11-13 RX ADMIN — POTASSIUM CHLORIDE 40 MEQ: 1500 TABLET, EXTENDED RELEASE ORAL at 17:30

## 2019-11-13 RX ADMIN — HEPARIN SODIUM 3700 UNITS: 1000 INJECTION, SOLUTION INTRAVENOUS; SUBCUTANEOUS at 18:30

## 2019-11-13 RX ADMIN — Medication 667 MG: at 17:29

## 2019-11-13 RX ADMIN — SODIUM BICARBONATE 650 MG: 650 TABLET ORAL at 10:45

## 2019-11-13 RX ADMIN — SODIUM BICARBONATE 650 MG: 650 TABLET ORAL at 17:29

## 2019-11-13 RX ADMIN — SODIUM BICARBONATE: 84 INJECTION, SOLUTION INTRAVENOUS at 05:18

## 2019-11-13 RX ADMIN — METHIMAZOLE 5 MG: 5 TABLET ORAL at 17:29

## 2019-11-13 RX ADMIN — PROMETHAZINE HYDROCHLORIDE 12.5 MG: 25 TABLET ORAL at 10:43

## 2019-11-13 RX ADMIN — POTASSIUM CHLORIDE 20 MEQ: 1500 TABLET, EXTENDED RELEASE ORAL at 07:38

## 2019-11-13 RX ADMIN — PROMETHAZINE HYDROCHLORIDE 12.5 MG: 25 TABLET ORAL at 22:11

## 2019-11-13 RX ADMIN — HEPARIN SODIUM 1050 UNITS: 5000 INJECTION, SOLUTION INTRAVENOUS at 00:27

## 2019-11-13 RX ADMIN — POTASSIUM CHLORIDE 20 MEQ: 20 TABLET, EXTENDED RELEASE ORAL at 05:15

## 2019-11-13 RX ADMIN — ASPIRIN 81 MG: 81 TABLET, COATED ORAL at 05:15

## 2019-11-13 RX ADMIN — PRASUGREL 10 MG: 10 TABLET, FILM COATED ORAL at 05:16

## 2019-11-13 RX ADMIN — INSULIN LISPRO 2 UNITS: 100 INJECTION, SOLUTION INTRAVENOUS; SUBCUTANEOUS at 10:52

## 2019-11-13 RX ADMIN — METOPROLOL TARTRATE 12.5 MG: 25 TABLET, FILM COATED ORAL at 05:15

## 2019-11-13 ASSESSMENT — ENCOUNTER SYMPTOMS
COLOR CHANGE: 0
FOCAL WEAKNESS: 0
RESPIRATORY NEGATIVE: 1
CONFUSION: 0
WEAKNESS: 1
TROUBLE SWALLOWING: 0
DIAPHORESIS: 0
DIARRHEA: 0
DIZZINESS: 1
EYES NEGATIVE: 1
NERVOUS/ANXIOUS: 0
AGITATION: 0
MUSCULOSKELETAL NEGATIVE: 1
COUGH: 0
CARDIOVASCULAR NEGATIVE: 1
BLOOD IN STOOL: 0
ABDOMINAL PAIN: 0
CHILLS: 0
HEADACHES: 0
NUMBNESS: 0
NAUSEA: 1
SHORTNESS OF BREATH: 0
DIZZINESS: 0
PSYCHIATRIC NEGATIVE: 1
CHEST TIGHTNESS: 0
PALPITATIONS: 0
ABDOMINAL DISTENTION: 0
FEVER: 0

## 2019-11-13 NOTE — PROGRESS NOTES
Assumed care at 1900, bedside report received from day shift RN. Pt. Is AFIB on the monitor. Initial assessment completed, orders reviewed, call light within reach, bed alarm  in use, and hourly rounding in place. POC addressed with patient, no additional questions at this time.

## 2019-11-13 NOTE — PROGRESS NOTES
Monitor Summary: afib converted to NSR at 21L40 -112 then 90-97 OCC PVCs. One beat of 2nd degree Mobitz 11. Strip in chart.   .20/.14/.36

## 2019-11-13 NOTE — ASSESSMENT & PLAN NOTE
Stable   Anemia--secondary to CKD  Normocytic anemia   Hb dropped to 6.8 on 11/12/19 and received 1 unit of blood transfused   Currently stable   CTM  Blood transfusion if the Hb < 7   Nephrology - epoetin with HD

## 2019-11-13 NOTE — CARE PLAN
Problem: Pain Management  Goal: Pain level will decrease to patient's comfort goal  Outcome: PROGRESSING AS EXPECTED     Problem: Fluid Volume:  Goal: Will maintain balanced intake and output  Outcome: PROGRESSING AS EXPECTED

## 2019-11-13 NOTE — DISCHARGE PLANNING
Outpatient Dialysis Placement Notification    Received notification for outpatient dialysis placement from Dr. Agee    Met with patient bedside and confirmed demographics and insurance information.  Provided patient with dialysis education materials and list of HD centers, referral initiated to:    [facility]  [address] [place], [place] [address]    Pending medical and financial clearance.    Becca Bojorquez- Dialysis Coordinator  [facility] # [phone]

## 2019-11-13 NOTE — PROGRESS NOTES
Cardiology Follow Up Progress Note    Date of Service  11/13/2019    Attending Physician  KEREN Canada M.D.    Chief Complaint   Chest pain    Cardiology consult   Elevated trop    HPI  Adalid Sepulveda is a 73 y.o. male admitted 11/11/2019 with chest pain. History of previous stenting at [facility] [[email or web address]] with Dr. Mann.    Past medical history of ESRD previous placement of AV fistula and failed, hypertension and diabetes.    Interim Events  11/13/19: chest has resolved. He received one round of hemodialysis yesterday and plan for today per nurse. Denies any SOB or dizziness. Vital sign stable. Converted to afib overnight and he has history of afib per patient.     11/12/19: currently his chest pain has resolved. Denies any SOB, dizziness or palpitations. SR . Patient is hesitant regarding dialysis and quality of life. Continue heparin gtt. He stated his shortness of breath and chest pain started about a month ago and progressing.     Trop T 816  Creatinine 6.53  GFR 8  CO2 7  Anion gap 17      Review of Systems  Review of Systems   Constitutional: Negative for chills, diaphoresis and fever.   HENT: Negative for nosebleeds and trouble swallowing.    Respiratory: Negative for cough, chest tightness and shortness of breath.    Cardiovascular: Negative for chest pain, palpitations and leg swelling.   Gastrointestinal: Negative for abdominal distention, abdominal pain and blood in stool.   Genitourinary: Negative for hematuria.   Skin: Negative for color change.   Neurological: Negative for dizziness, syncope and numbness.   Psychiatric/Behavioral: Negative for agitation and confusion. The patient is not nervous/anxious.        Vital signs in last 24 hours  Temp:  [36.1 °C (96.9 °F)-37.3 °C (99.1 °F)] 37.2 °C (99 °F)  Pulse:  [] 79  Resp:  [10-20] 18  BP: (109-148)/(47-97) 119/54  SpO2:  [94 %-100 %] 99 %    Physical Exam  Physical Exam  Vitals signs and nursing note reviewed.    Constitutional:       Appearance: Normal appearance.   HENT:      Head: Normocephalic and atraumatic.   Eyes:      Pupils: Pupils are equal, round, and reactive to light.   Neck:      Musculoskeletal: Normal range of motion.   Cardiovascular:      Rate and Rhythm: Normal rate. Rhythm irregular.      Heart sounds: Normal heart sounds. No murmur.   Pulmonary:      Effort: Pulmonary effort is normal.      Breath sounds: Normal breath sounds.   Abdominal:      General: Abdomen is flat.   Skin:     General: Skin is warm and dry.   Neurological:      General: No focal deficit present.      Mental Status: He is alert and oriented to person, place, and time.   Psychiatric:         Mood and Affect: Mood normal.         Behavior: Behavior normal.         Thought Content: Thought content normal.         Judgment: Judgment normal.         Lab Review  Lab Results   Component Value Date/Time    WBC 6.3 11/13/2019 02:44 AM    RBC 2.21 (L) 11/13/2019 02:44 AM    HEMOGLOBIN 6.8 (L) 11/13/2019 02:44 AM    HEMATOCRIT 19.5 (L) 11/13/2019 02:44 AM    MCV 88.2 11/13/2019 02:44 AM    MCH 30.8 11/13/2019 02:44 AM    MCHC 34.9 11/13/2019 02:44 AM    MPV 9.2 11/13/2019 02:44 AM      Lab Results   Component Value Date/Time    SODIUM 143 11/13/2019 02:44 AM    POTASSIUM 2.8 (L) 11/13/2019 02:44 AM    CHLORIDE 109 11/13/2019 02:44 AM    CO2 19 (L) 11/13/2019 02:44 AM    GLUCOSE 87 11/13/2019 02:44 AM    BUN 77 (HH) 11/13/2019 02:44 AM    CREATININE 4.27 (H) 11/13/2019 02:44 AM      Lab Results   Component Value Date/Time    ASTSGOT 13 11/13/2019 02:44 AM    ALTSGPT 12 11/13/2019 02:44 AM     Lab Results   Component Value Date/Time    CHOLSTRLTOT 133 04/08/2019 11:56 AM    LDL 61 04/08/2019 11:56 AM    HDL 26 (A) 04/08/2019 11:56 AM    TRIGLYCERIDE 232 (H) 04/08/2019 11:56 AM    TROPONINT 749 (H) 11/12/2019 11:53 AM    TROPONINT 742 (H) 11/12/2019 11:53 AM       No results for input(s): NTPROBNP in the last 72 hours.    Cardiac Imaging and  Procedures Review  EKG:    SINUS RHYTHM   RBBB AND LAFB   ABNORMAL T, CONSIDER ISCHEMIA, LATERAL LEADS   Compared to ECG 11/12/2019 00:48:53   No significant changes       Echocardiogram:    11/12/19  Thin and hypokinetic mid anteroseptal  wall.  Low normal left ventricular systolic function.  Left ventricular ejection fraction is visually estimated to be 50-55%.  Normal left ventricular chamber size.  Structurally normal aortic and mitral valve without significant   stenosis or regurgitation.  Normal left atrial size.  Unable to estimate pulmonary artery pressure due to an inadequate   tricuspid regurgitant jet.  Normal inferior vena cava size and inspiratory collapse.  Normal right ventricular size and systolic function.  Normal pericardium without effusion.       Assessment/Plan  No new Assessment & Plan notes have been filed under this hospital service since the last note was generated.  Service: Cardiology    1. NSTEMI:   - peaked at 829, symptoms has improved  - records from [facility] pending   - possible cardiac catheterization tomorrow AM, please keep NPO at midnight.    - continue asa,  effient 10mg qd and heparin gtt   - increased atorvastatin 40mg qd   - increased metoprolol 25mg BID     2. ESRD:  - has tunneled cath in place now, plan for HD today      3. Atrial fibrillation:   - irregular overnight and this morning   -  Increased metoprolol 25mg BID   - continue heparin gtt    4. Hypokalemia:  - K 2.8 this morning, on k supplement   - defer to nephrology with ESRD    5. Anemia:  - hgb 6.8  - denies any blood in stool or urine   - most likely due to ERSD       Thank you for allowing me to participate in the care of this patient.  I will continue to follow this patient    Please contact me with any questions.    BABAR Perry   [facility]

## 2019-11-13 NOTE — PROGRESS NOTES
Assumed care of patient and bedside report received from previous RN. Plan of care discussed with patient. All concerns voiced at this time. Patient is alert and oriented x 4. VSS. Patient educated on importance of calling, bed controls on, bed locked and in lowest position, call light with patient. Patient is in rhythm: SR. Cardiology team and [facility] at bedside.

## 2019-11-13 NOTE — PROGRESS NOTES
[facility] Daily Progress Note    Date of Service  11/13/2019    Chief Complaint  Follow up New ESRD    Interval Problem Update  This is a 73-year-old  male with past medical history significant for chronic kidney disease stage V, not yet   initiated on hemodialysis, hypertension, diabetes mellitus type 2, coronary artery disease status post stent in the past, anemia secondary to chronic kidney disease, CKD bone mineral disorder/renal osteodystrophy, hyperparathyroidism treated with temozolomide, who was admitted with complaints of chest discomfort and shortness of breath x4 days and was found to have a non-ST elevation MI and now noted to have worsening renal function as well as severe metabolic acidosis.  Patient reports that for the past 4 days, he has not been feeling well.  He has been having this vague chest discomfort as well as some shortness of breath.  He came to the ER for further evaluation last night and was found to have an elevated troponin level consistent with a non-ST elevation MI.  He has been seen by cardiology who recommends medical management at this time.  Patient does have a history of chronic kidney disease stage V, for which he follows with [facility].  His GFR has been around 9-10 mL per minute, but he has been asymptomatic as an outpatient, and so, he has not yet needed to initiate dialysis.  He has a history of a failed AV fistula and he was referred for vascular surgery to have a new AV fistula created and he is in the process of this.  The patient reports that at this time, he does not have any shortness of breath and his chest pain has resolved.  His labs show an elevated BUN of 129, creatinine is 6.53 and a GFR of 8 mL per minute and he has a significant metabolic acidosis with a serum bicarbonate level of 7 and this was a level that I was at last night on admission as well.  He only recently was started on a bicarbonate drip with one-half normal  saline with 75 mEq of sodium bicarbonate at 75 mL an hour by the primary service.  His troponin level has peaked at 816 and is now trending down.    DAILY NEPHROLOGY SUMMARY  11/13/19--No events, tunneled HD catheter placed yest by IR and HD initiated, BP low during HD and no fluid removed, pt feels nauseous and weak today, BP stable, acidosis improved, serum K low, BUN improved with HD, getting PRBC transfusion today    Review of Systems  Review of Systems   Constitutional: Positive for malaise/fatigue. Negative for chills and fever.   HENT: Negative.    Eyes: Negative.    Respiratory: Negative.    Cardiovascular: Negative.    Gastrointestinal: Positive for nausea. Negative for abdominal pain and diarrhea.   Genitourinary: Negative.    Musculoskeletal: Negative.    Skin: Positive for itching. Negative for rash.   Neurological: Positive for dizziness and weakness. Negative for focal weakness and headaches.   Endo/Heme/Allergies: Negative.    Psychiatric/Behavioral: Negative.         Physical Exam  Temp:  [36.1 °C (96.9 °F)-37.4 °C (99.3 °F)] 37.4 °C (99.3 °F)  Pulse:  [] 87  Resp:  [10-20] 16  BP: (109-148)/(47-97) 120/58  SpO2:  [94 %-100 %] 97 %    Physical Exam  Vitals signs and nursing note reviewed.   Constitutional:       General: He is not in acute distress.     Appearance: Normal appearance. He is ill-appearing.   HENT:      Head: Normocephalic and atraumatic.      Right Ear: External ear normal.      Left Ear: External ear normal.      Nose: Nose normal.      Mouth/Throat:      Mouth: Mucous membranes are moist.      Pharynx: Oropharynx is clear. No oropharyngeal exudate.   Eyes:      General: No scleral icterus.     Extraocular Movements: Extraocular movements intact.      Conjunctiva/sclera: Conjunctivae normal.   Neck:      Musculoskeletal: Normal range of motion and neck supple. No muscular tenderness.   Cardiovascular:      Rate and Rhythm: Normal rate and regular rhythm.      Heart sounds:  Normal heart sounds. No murmur.   Pulmonary:      Effort: Pulmonary effort is normal. No respiratory distress.      Breath sounds: Normal breath sounds. No wheezing or rales.   Abdominal:      General: Abdomen is flat. Bowel sounds are normal. There is no distension.      Palpations: Abdomen is soft.      Tenderness: There is no tenderness.   Musculoskeletal: Normal range of motion.         General: No swelling.   Lymphadenopathy:      Cervical: No cervical adenopathy.   Skin:     General: Skin is warm and dry.      Findings: No rash.   Neurological:      General: No focal deficit present.      Mental Status: He is alert and oriented to person, place, and time.      Motor: No weakness.   Psychiatric:         Mood and Affect: Mood normal.         Behavior: Behavior normal.         Fluids    Intake/Output Summary (Last 24 hours) at 11/13/2019 1206  Last data filed at 11/13/2019 1015  Gross per 24 hour   Intake 3929.58 ml   Output 1200 ml   Net 2729.58 ml       Laboratory  Recent Labs     11/11/19  2130 11/12/19  0557 11/13/19  0244   WBC 8.5 8.0 6.3   RBC 2.61* 2.50* 2.21*   HEMOGLOBIN 8.1* 7.7* 6.8*   HEMATOCRIT 24.2* 23.3* 19.5*   MCV 92.7 93.2 88.2   MCH 31.0 30.8 30.8   MCHC 33.5* 33.0* 34.9   RDW 49.2 49.4 43.7   PLATELETCT 252 239 207   MPV 9.1 9.1 9.2     Recent Labs     11/12/19  1153 11/12/19  2211 11/13/19  0244   SODIUM 140 142 143   POTASSIUM 4.0 3.0* 2.8*   CHLORIDE 116* 108 109   CO2 5* 19* 19*   GLUCOSE 78 160* 87   * 80* 77*   CREATININE 6.78* 4.39* 4.27*   CALCIUM 8.5 7.8* 7.8*     Recent Labs     11/12/19  0557 11/12/19  1153 11/12/19  1749   APTT 82.1* 77.4* 86.2*   INR  --  1.44*  --      No results for input(s): NTPROBNP in the last 72 hours.        Assessment:  1. PAT on CKD Stage V--progression of his CKD, now new ESRD  --I had a long talk with patient and he has been reluctant to start HD in the past but he is now agreeable  --R Chest PC placed 11/12 and HD initiated  2. NSTEMI--medical  management for now per cardiology  3. Metabolic Acidosis--improved with bicarb drip and dialysis  4. HTN--BP low during HD yest, now stable  5. DM II--per primary svc  6. Renal Osteodystrophy/CKD Bone Mineral Disorder--phos elevated  7. Hyperthyroidism--treated with methimazole as outpt and pt reports that he is still on this medication  8. Anemia--secondary to CKD  --No need for IV Iron  --Getting PRBC transfusion 11/13  9. Secondary Hyperparathyroidism  10. SOB--resolved  11. Nausea--anti-emetics per primary svc  12. Pruritis--likely related to hyperphosphatemia  13. Hypokalemia--additional supplementation ordered and will adjust K bath on dialysis  14. Moderate Protein-Calorie Malnutrition     Plan:  --HD again today for clearance  --Pt will need outpt HD arrangements  --Quantiferon gold ordered to facilitate outpt HD placement  --DC bicarb drip  --DC PO bicarb  --Cardiology following for NSTEMI  --Start Epogen with HD  --Transfuse PRN for hgb less than 7  --Start phoslo with meals  --Check iPTH level  --Pt is on methimazole as outpt for treatment of hyperthyroidism by his PCP, primary svc to manage while pt is admitted    **Discussed with RN

## 2019-11-13 NOTE — CONSULTS
DATE OF SERVICE:  11/12/2019    REQUESTING PHYSICIAN:  [facility] resident service.    REASON FOR CONSULTATION:  Evaluation and management of acute renal failure and   metabolic acidosis.    HISTORY OF PRESENT ILLNESS:  This is a 73-year-old  male with past   medical history significant for chronic kidney disease stage V, not yet   initiated on hemodialysis, hypertension, diabetes mellitus type 2, coronary   artery disease status post stent in the past, anemia secondary to chronic   kidney disease, CKD bone mineral disorder/renal osteodystrophy,   hyperparathyroidism treated with temozolomide, who was admitted with   complaints of chest discomfort and shortness of breath x4 days and was found   to have a non-ST elevation MI and now noted to have worsening renal function   as well as severe metabolic acidosis.  Patient reports that for the past 4   days, he has not been feeling well.  He has been having this vague chest   discomfort as well as some shortness of breath.  He came to the ER for further   evaluation last night and was found to have an elevated troponin level   consistent with a non-ST elevation MI.  He has been seen by cardiology who   recommends medical management at this time.  Patient does have a history of   chronic kidney disease stage V, for which he follows with [facility]   [facility].  His GFR has been around 9-10 mL per minute, but he has been   asymptomatic as an outpatient, and so, he has not yet needed to initiate   dialysis.  He has a history of a failed AV fistula and he was referred for   vascular surgery to have a new AV fistula created and he is in the process of   this.  The patient reports that at this time, he does not have any shortness   of breath and his chest pain has resolved.  His labs show an elevated BUN of   129, creatinine is 6.53 and a GFR of 8 mL per minute and he has a significant   metabolic acidosis with a serum bicarbonate level of 7 and this was a level   that I  was at last night on admission as well.  He only recently was started   on a bicarbonate drip with one-half normal saline with 75 mEq of sodium   bicarbonate at 75 mL an hour by the primary service.  His troponin level has   peaked at 816 and is now trending down.    REVIEW OF SYSTEMS:  GENERAL:  The patient reports malaise and fatigue.  He denies any fevers or   chills.  HEENT:  The patient denies any headache, visual changes or sore throat.  NECK:  The patient denies any masses or swelling of his neck.  CARDIOVASCULAR:  The patient did have chest discomfort, substernal, as well as   shortness of breath x4 days.  This is now resolved.  He denies any   palpitations.  RESPIRATORY:  The patient did have some shortness of breath for the past few   days.  This is now resolved.  He denies any cough or hemoptysis or sputum   production.  GASTROINTESTINAL:  The patient does report some mild nausea and decreased   appetite.  He denies any diarrhea, constipation, or abdominal pain.  The   patient denies any lower extremity edema.  He denies any joint pains or joint   deformities.  SKIN:  The patient denies any new rashes, ulcerations or new lesions.  NEUROLOGIC:  The patient reports some occasional dizziness.  He denies any   headache.  He denies any focal weakness.  PSYCHIATRIC:  The patient denies any depression, anxiety, or hallucinations.  GENITOURINARY:  The patient denies any decreased urine output.  He denies any   gross hematuria or dysuria.    PAST MEDICAL HISTORY:  1.  Chronic kidney disease stage V.  The patient has not yet initiated on   hemodialysis as an outpatient.  He has been considering whether or not he   would even want to do hemodialysis as an outpatient.  2.  Hypertension.  3.  Diabetes mellitus type 2 diagnosed in the 1990s and is associated with   diabetic retinopathy and diabetic neuropathy and diabetic nephropathy.  4.  Coronary artery disease.  Patient is status post stents in the past.  5.  Anemia  secondary to chronic kidney disease.  6.  Chronic kidney disease bone mineral disorder/renal osteodystrophy.  7.  Secondary hyperparathyroidism.  8.  Hyperparathyroidism.  Patient has been on methimazole as an outpatient and   it is being managed by his primary care physician.    PAST SURGICAL HISTORY:  1.  Coronary artery stent.  2.  Ruptured appendix surgery.  3.  Hernia repair with mesh placement.  4.  Knee surgery.  5.  Right upper arm AV fistula creation that has failed.    SOCIAL HISTORY:  He used to smoke 1 pack a day of cigarettes for 50 years.  He   quit a few years ago.  He denies any alcohol use.  He denies any illicit drug   use.    FAMILY HISTORY:  There is no family history of kidney disease or relatives on   dialysis.    ALLERGIES:  No known medical allergies.    CURRENT MEDICATIONS:  1.  Heparin drip.  2.  Half-normal saline with 75 mEq of sodium bicarbonate running at 75 mL per   hour.  3.  Aspirin 81 mg daily.  4.  Lipitor 20 mg nightly.  5.  Lantus 14 units nightly.  6.  Humalog sliding scale insulin.  7.  Metoprolol 12.5 mg twice daily.  8.  Effient 10 mg daily.  9.  Compazine 5 mg orally x1 dose.  10.  Senokot 2 tablets twice daily.  11.  Sodium bicarbonate 650 mg 3 times a day.  12.  Ancef 2 g IV x1 given earlier today.  13.  Nitrostat 0.4 mg p.o. x1 dose given earlier today.    PHYSICAL EXAMINATION:  VITAL SIGNS:  Temperature is 98.4 degrees Fahrenheit, pulse 91, respirations   18, blood pressure 128/63, satting 100% on 2 L nasal cannula.  GENERAL:  Patient is alert and oriented x3 and in no acute distress.  He does   appear chronically ill and older than his stated age.  HEENT:  Pupils are equal, round and reactive to light.  Extraocular muscles   are intact.  Mucous membranes appear dry.  NECK:  Exam reveals no JVD.  Trachea is midline.  There is no thyromegaly.  CARDIOVASCULAR:  Heart has regular rate and rhythm.  No murmurs, rubs or   gallops.  RESPIRATORY:  Lungs are with decreased breath  sounds at bilateral bases.    There are no wheezes, rales, or rhonchi.  There is no tachypnea and there is   no increased work of breathing.  There is symmetric chest wall rise.  GASTROINTESTINAL:  Abdomen is soft, nontender, nondistended.  Bowel sounds are   present.  EXTREMITIES:  Reveals no clubbing, cyanosis or edema.  SKIN:  Reveals no rashes or ulcerations.  There is normal skin turgor.  NEUROLOGIC:  Reveals no focal motor deficits.  Sensation is grossly intact to   light touch.  LYMPHATIC:  Reveals no cervical lymphadenopathy, no axillary lymphadenopathy.  PSYCHIATRIC:  The patient is alert and oriented x3.  He has a flat affect and   he is calm and cooperative.    LABORATORY DATA:  Labs reveal a white blood cell count of 8.0, hemoglobin 7.7,   platelet count 239.  Differential reveals 76% neutrophils, 10% lymphocytes.    Chemistries reveal a sodium of 141, potassium 4.2, chloride is 117,   bicarbonate is 7, BUN is 129, creatinine is 6.53, calcium is 8.5, AST is 8,   ALT is 18, alkaline phosphatase is 157, albumin is 3.4.  Troponin is 816.  INR   is 1.44.  Blood cultures x2 on 11/11/2019 are so far no growth.    IMAGING:  Chest x-ray on 11/11/2019 reveals scarring in the right lung base   with faint pleural calcification and chronic interstitial change, superimposed   infection cannot be excluded.    ASSESSMENT:  1.  Acute renal failure on chronic kidney disease stage V.  There has been   progression of his chronic kidney disease.  Patient has been reluctant to   initiate hemodialysis in the past; however, I did have a long talk with the   patient today given his worsening renal function as well as his severe   metabolic acidosis and he is now agreeable to initiating hemodialysis.  2.  Non-ST elevation myocardial infarction, medical management for now per   cardiology.  3.  Metabolic acidosis.  This is severe.  Recommend changing bicarbonate drip   to D5W with 150 mEq of sodium bicarbonate at 125 mL per hour.   We will   initiate hemodialysis once the line has been placed.  4.  Hypertension.  Blood pressure is currently stable.  5.  Diabetes mellitus type 2.  Management per the primary service.  6.  Renal osteodystrophy/chronic kidney disease bone mineral disorder.  7.  Hyperthyroidism.  Patient has been treated with methimazole as an   outpatient.  Need to clarify with the patient if he is still on this   medication and this medication is being managed by his primary care physician.  8.  Anemia secondary to chronic kidney disease.  9.  Secondary hyperparathyroidism.  Patient is on calcitriol as an outpatient.  10.  Shortness of breath.  Patient reports that this has improved.  He is   currently on supplemental oxygen.  11.  Moderate protein-calorie malnutrition.    PLAN:  1.  Patient is agreeable to initiating hemodialysis.  2.  We will ask IR to place a tunneled hemodialysis catheter and we will   initiate hemodialysis.  3.  Change sodium bicarbonate drip to D5W with 150 mEq of sodium bicarbonate   at 125 mL per hour until dialysis has been initiated.  4.  Cardiology is following for non-ST elevation myocardial infarction.  5.  Check iron studies.  6.  Transfuse as needed for hemoglobin less than 7.  7.  Patient will need outpatient hemodialysis arrangements.  8.  Check phosphorus level.  9.  Check intact PTH level.  10.  No dietary protein restrictions at this time.    Thank you for this very interesting consult and thank you for involving me in   the care of this very pleasant patient.  If you have any questions or need any   further information, please do not hesitate to contact me.       ____________________________________     MD GAIL BOSCH / DALILA    DD:  11/12/2019 18:53:59  DT:  11/12/2019 22:31:56    D#:  [id number]  Job#:  [id number]

## 2019-11-13 NOTE — PROGRESS NOTES
Internal Medicine Interval Note  Note Author: Jenny Holland M.D.     Name Luis Sepulveda     1945   Age/Sex 73 y.o. male   MRN [id number]   Code Status DNAR/ Intubation ok      After 5PM or if no immediate response to page, please call for cross-coverage  Attending/Team: /shirley  See Patient List for primary contact information  Call [phone] to page    1st Call - Day Intern (R1):    2nd Call - Day Sr. Resident (R2/R3):            Reason for interval visit  (Principal Problem)   Chest pain with NSTEMI     Interval Problem Daily Status Update  (24 hours, problem oriented, brief subjective history, new lab/imaging data pertinent to that problem)     Mr Sepulveda is a 72yo male who came with the complaint of chest pain x 3 weeks and based on high troponin levels( 829) and EKG findings of ST depression in anterolateral leads is diagnosed with NSTEMI Patient has a PMH significant for CKD Stage V, HTN, DM II, CAD s/p stent , anemia secondary to CKD and  Hyperthyroidism treated with methimazole.    S: No overnight acute events.Currently he has mild chest discomfort. Denies any SOB, dizziness or palpitations. SR . Patient was hesitant regarding dialysis in the past but agrees now.  Continue heparin gtt. He stated his shortness of breath and chest pain started about a month ago and has been progressing. Previously pain only with exertion and relieved with rest but now has pressure type chest discomfort even at rest. He has sore throat for a month .He also had episodes of nausea and vomiting for more than 2 weeks duration which has resolved like a week back.  O:  He has low Bicarb levels of 7 so started him on NaHCO3 infusion along with oral bicarb supplementation .As patient has agreed to initiate the HD nephrology is on board and will start dialysis today.Cardiology saw the patient and there plan is to  hold off cardiac catheterization at this time due to contrast  injection and ESRD. They will reconsider the catheterization after the dialysis and has recommended to continue the Heparin gtt , Metoprolol ,aspirin ,prasugrel and statin  .    ROS   Constitutional: Negative for chills, fever and weight loss.   HENT: Negative for ear pain, hearing loss and tinnitus.    Eyes: Negative for blurred vision, double vision and photophobia.   Respiratory: Positive for productive cough , sore throat Negative for hemoptysis and wheezing.    Cardiovascular: Positive for chest discomfort . Negative for palpitations, orthopnea, claudication and leg swelling.   Gastrointestinal: Negative for abdominal pain, constipation, diarrhea, heartburn and nausea.   Genitourinary: Negative for dysuria and urgency.   Musculoskeletal: Negative for myalgias.   Skin: Negative for rash.   Neurological: Negative for dizziness, tingling, sensory change, speech change and headaches.       Disposition/Barriers to discharge  NSTEMI with chest pain on heparin drip  ESRD with HCO3 of 7     Consultants/Specialty  Cardiology Dr.Benjamin Kalyani MOTA  Nephrology Dr.Soni YUKI Agee  PCP: Patito Edgar M.D.      Quality Measures  Quality-Core Measures        Physical Exam       Vitals:    11/12/19 1430 11/12/19 1435 11/12/19 1440 11/12/19 1445   BP: 111/50 112/47 112/56 116/52   Pulse: 95 97 96 98   Resp: 13 16 15 14   Temp:       TempSrc:       SpO2: 100% 97% 95% 94%   Weight:       Height:         Body mass index is 25.79 kg/m². Weight: 70.3 kg (154 lb 15.7 oz)  Oxygen Therapy:  Pulse Oximetry: 94 %, O2 (LPM): 2, O2 Delivery: None (Room Air)    Physical Exam    Constitutional: He is oriented to person, place, and time and well-developed, well-nourished, and in no distress. No distress.   HENT:   Head: Normocephalic and atraumatic.   Mouth/Throat: No oropharyngeal exudate.   Eyes: Pupils are equal, round, and reactive to light. EOM are normal.   Neck: Normal range of motion. Neck supple.   Cardiovascular: Normal  rate, regular rhythm and intact distal pulses.   Pulmonary/Chest: Effort normal and breath sounds normal. No respiratory distress. He has no wheezes. He has no rales.   Abdominal: Soft. Bowel sounds are normal. He exhibits no distension. There is no tenderness. There is no rebound.   Musculoskeletal: Normal range of motion.         General: No edema.   Neurological: He is alert and oriented to person, place, and time. No cranial nerve deficit. Gait normal. Coordination normal.   Skin: Skin is warm and dry. No erythema.       Assessment/Plan     * NSTEMI (non-ST elevated myocardial infarction) ([facility])  Assessment & Plan  Patient presented with worsening chest pain and shortness of breath.  He has history of coronary artery disease with stent placed 7 years ago and congestive heart failure.  He has risk factors such as diabetes mellitus, hypertension, dyslipidemia, family history of coronary artery disease, 45 years smoking history.On admission troponin were 829 with a EKG showing ST depression and T wave changes in emperatriz lateral leads.    Plan:  Continue to monitor on  telemetry  Continue pulse ox  Serial troponin has been drawn which is trending down   Follow up  echo ordered - pending   Continue heparin gtt  Continue Metoprolol 12.5 mg   Continue dual antiplatelet  therapy  Cardiology is on board.cardiac catheterization is on hold at this time due to contrast injection requirement and ESRD. They will reconsider the catheterization after the dialysis and has recommended to continue the Heparin gtt , Metoprolol ,aspirin ,prasugrel and statin  .        Chronic kidney disease (CKD), stage V ([facility])- (present on admission)  Assessment & Plan  Patient has history of chronic kidney disease stage V   Presenting to the emergency department due to worsening chest pain and shortness of breath  He is following up with , nephrologist as an outpatient   He has history of AV fistula placed in the past which failed.,  He  has never been on dialysis before.  Patient agrees to get HD now.  Bicarb levels of 7 on admission    On admission his creatinine is 7.23, bun 125 which went upto  and Cr 6.18 on 11/12/19  Avoid nephrotoxins  Continue renal diet  Started him on NaHCO3 infusion and  oral bicarb supplementation   Nephrology is on board and they plan to start HD today.  Labs for phosphorous   Continue to monitor the labs        Diabetes mellitus type 2, controlled, with complications (HCC)- (present on admission)  Assessment & Plan  Patient has history of type 2 diabetes mellitus  At home he is on insulin Lantus 36 units every evening, not in any short-acting insulin.  On admission serum glucose 142  Hemoglobin A1c 5.3 in September 2019    Started him on insulin Lantus 14 units, day team to consider increasing insulin Lantus if blood sugars are not controlled.  On insulin short-acting sliding scale.  Continue to monitor    Anemia of chronic disease- (present on admission)  Assessment & Plan  Anemia--secondary to CKD  Normocytic anemia   May be erythropoietin deficiency vs Anemia of chronic disease    Plan:  Iron studies   Blood transfusion if the Hb < 7       Gastroesophageal reflux disease- (present on admission)  Assessment & Plan  Patient has history of GERD.  He is on pantoprazole at home  Will continue that      Essential hypertension- (present on admission)  Assessment & Plan  History of hypertension  On admission blood pressure 114/60  Holding home medication , continue to monitor, restart if BP is elevated

## 2019-11-13 NOTE — PROGRESS NOTES
Patients K+ 2.8. hbg 6.8 new order to receive 1 unit PRBC and replace K+ with 20 meq PO potassium. New IV site placed for NS 30ml/hr. Bicarb infusing along with another site infusing heparin gtt. Patient with loose stools, senna held.

## 2019-11-13 NOTE — CARE PLAN
Problem: Safety  Goal: Will remain free from injury  Note:   Unsteady gait, encouraged to call for assistance to ambulate  to bathroom. Bed alarmed, call bell in reach. Hourly rounding implemented.     Problem: Respiratory:  Goal: Respiratory status will improve  Note:   Due to increased SOB and high troponins, patient on 2L NC. Dyspnea on exertion.      Problem: Pain Management  Goal: Pain level will decrease to patient's comfort goal  Note:   Denies pain.

## 2019-11-13 NOTE — ASSESSMENT & PLAN NOTE
Asymptomatic   US thyroid showed benign thyroid nodules-(benign bx 8/23/18)  Patient supposed to take methimazole 5 mg daily as per the PCP notes but patient is not compliant .  11/13/19 TSH low = <0.005 and high free T4 of 4.16   Continue with methimazole 5 mg daily  outpatient follow up with primary care

## 2019-11-13 NOTE — PROGRESS NOTES
Internal Medicine Interval Note  Note Author: Jenny Holland M.D.     Name Luis Sepulveda     1945   Age/Sex 73 y.o. male   MRN [id number]   Code Status DNAR/ Intubation ok      After 5PM or if no immediate response to page, please call for cross-coverage  Attending/Team: /shirley  See Patient List for primary contact information  Call [phone] to page    1st Call - Day Intern (R1):    2nd Call - Day Sr. Resident (R2/R3):            Reason for interval visit  (Principal Problem)   Chest pain with NSTEMI     Interval Problem Daily Status Update  (24 hours, problem oriented, brief subjective history, new lab/imaging data pertinent to that problem)     Mr Sepulveda is a 74yo male who came with the complaint of chest pain x 3 weeks and based on high troponin levels( 829) and EKG findings of ST depression in anterolateral leads is diagnosed with NSTEMI Patient has a PMH significant for CKD Stage V, HTN, DM II, CAD s/p stent , anemia secondary to CKD and  Hyperthyroidism treated with methimazole.    S: Patient is stable , alert and oriented. Yesterday evening he had an episode of tachycardia with EKG showing A-fib. One dose of oral Metoprolol given stat. Patient reverted back to sinus rhythm in about 2-3 hours. Patient was asymptomatic during the episode. He does have history of paroxysmal A-fib/palpitations as per the patient. His Hb levels fell down to 6.8 so received 1 unit of packed red cells this morning. After receiving the blood he c/o mild nausea and dizziness but no fever or chills or SOB . Gave prn phenergan.( zofran avoided as QTc >500)  Patient is on HD. Today is day 2. Nephrology on board . Will add epoetin for his anemia and Phoslo for high phosphorous levels.Discontinued the bicarb as the patient's HCO3 has improved.  Continue heparin gtt as per the cardiology recommendation, are planning to do the cardiac cath 19 so patient will be NPO  midnight.      ROS   Constitutional: Negative for chills, fever and weight loss.   HENT: Negative for ear pain, hearing loss and tinnitus.    Eyes: Negative for blurred vision, double vision and photophobia.   Respiratory: Positive for productive cough , sore throat Negative for hemoptysis and wheezing.    Cardiovascular: Denies chest pain . Negative for palpitations, orthopnea, claudication and leg swelling.   Gastrointestinal: Negative for abdominal pain, constipation, diarrhea, heartburn and nausea.   Genitourinary: Negative for dysuria and urgency.   Musculoskeletal: Negative for myalgias.   Skin: Negative for rash.   Neurological: Negative for dizziness, tingling, sensory change, speech change and headaches.       Disposition/Barriers to discharge  NSTEMI with chest pain     Consultants/Specialty  Cardiology Dr.Benjamin Kalyani MOTA  Nephrology Dr.Soni YUKI Agee  PCP: Patito Edgar M.D.      Quality Measures  Quality-Core Measures   Reviewed items::  EKG reviewed, Labs reviewed, Medications reviewed and Radiology images reviewed  Tomas catheter::  No Tomas  DVT prophylaxis pharmacological::  Heparin (heparin drip)          Physical Exam       Vitals:    11/13/19 0630 11/13/19 0848 11/13/19 1015 11/13/19 1248   BP: 119/54 126/56 120/58 122/58   Pulse: 79 93 87 90   Resp: 18 16 16 16   Temp: 37.2 °C (99 °F) 37.1 °C (98.8 °F) 37.4 °C (99.3 °F) 36.9 °C (98.5 °F)   TempSrc:  Temporal Temporal Temporal   SpO2: 99% 98% 97% 98%   Weight:       Height:         Body mass index is 25.86 kg/m². Weight: 70.5 kg (155 lb 6.8 oz)  Oxygen Therapy:  Pulse Oximetry: 98 %, O2 (LPM): 0, O2 Delivery: None (Room Air)    Physical Exam    Constitutional: He is oriented to person, place, and time and well-developed, well-nourished, and in no distress. No distress.   HENT:   Head: Normocephalic and atraumatic.   Mouth/Throat: No oropharyngeal exudate.   Eyes: Pupils are equal, round, and reactive to light. EOM are normal.   Neck:  Normal range of motion. Neck supple.   Cardiovascular: Normal rate, regular rhythm and intact distal pulses.   Pulmonary/Chest: Effort normal and breath sounds normal. No respiratory distress. He has no wheezes. He has no rales.   Abdominal: Soft. Bowel sounds are normal. He exhibits no distension. There is no tenderness. There is no rebound.   Musculoskeletal: Normal range of motion.         General: No edema.   Neurological: He is alert and oriented to person, place, and time. No cranial nerve deficit. Gait normal. Coordination normal.   Skin: Skin is warm and dry. No erythema.       Assessment/Plan     * NSTEMI (non-ST elevated myocardial infarction) ([facility])  Assessment & Plan  Patient presented with worsening chest pain and shortness of breath.  He has history of coronary artery disease with stent placed 7 years ago and congestive heart failure.  He has risk factors such as diabetes mellitus, hypertension, dyslipidemia, family history of coronary artery disease, 45 years smoking history.  On admission troponin were 829, trending down currently  EKG showing ST depression and T wave changes in emperatriz lateral leads.    Plan:  Continue to monitor on  telemetry  Continue pulse ox  Continue nitroglycerin prn for chest pain  Follow up  echo ordered - pending   Continue heparin gtt  Continue Metoprolol 12.5 mg   Continue dual antiplatelet  therapy  Cardiology is on board.cardiac catheterization planned for 11/14/19.  Cardiology recommended to continue the Heparin gtt , Metoprolol ,aspirin ,prasugrel and statin  .        Chronic kidney disease (CKD), stage V ([facility])- (present on admission)  Assessment & Plan  Patient has history of chronic kidney disease stage V   Presenting to the emergency department due to worsening chest pain and shortness of breath  He is following up with , nephrologist as an outpatient   On admission his creatinine is 7.23, bun 125  Patient started on HD on 11/12/19  HCO3 was 7 on  admission which has improved to 24 after the NaHCO3 infusion.    Plan:  Avoid nephrotoxins  Continue renal diet  Nephrology discontinued the  NaHCO3 infusion and  oral bicarb supplementation as the labs has improved and patient is on HD    phosphorous level is high- will start phoslo    Continue to monitor the labs        Hyperthyroidism- (present on admission)  Assessment & Plan  No temperature intolerance. No change in weight, hair/skin quality, BMs.   No tremors, weakness.  No peripheral swelling.  No mood changes.  FH: son  US thyroid showed benign thyroid nodules-(benign bx 8/23/18)  Patient is on methimazole 5 mg daily as per the PCP notes    Plan:  Continue with methimazole 5 mg daily    Diabetes mellitus type 2, controlled, with complications (HCC)- (present on admission)  Assessment & Plan  Patient has history of type 2 diabetes mellitus  At home he is on insulin Lantus 36 units every evening, not in any short-acting insulin.  On admission serum glucose 142  Hemoglobin A1c 5.3 in September 2019    Continue  insulin Lantus 14 units   Continue the  sliding scale Insulin   Hypoglycemia protocol  Continue to monitor    Anemia of chronic disease- (present on admission)  Assessment & Plan  Anemia--secondary to CKD  Normocytic anemia   Hb dropped to 6.8   1 unit of blood transfused     Plan:  CTM  Blood transfusion if the Hb < 7   Nephrology to start epoetin with HD      Gastroesophageal reflux disease- (present on admission)  Assessment & Plan  Patient has history of GERD.  He is on pantoprazole at home        Essential hypertension- (present on admission)  Assessment & Plan  History of hypertension  On admission blood pressure 114/60  Holding home medication , continue to monitor, restart if BP is elevated

## 2019-11-13 NOTE — THERAPY
"  Speech Language Therapy Clinical Swallow Evaluation completed.  Functional Status: Patient was seen for a clinical bedside swallow evaluation this date. Patient was upright edge of bed. Patient with decreased vocal quality noted. Patient with emesis bag complaints of n/v. Patient consumed limited PO trials 2/2 to this and refusal of further textures because of vomiting. Patient consumed PO trials of ice and thins. Patient demonstrated overall generalized weakness, delayed onset of swallow, decreased and delayed laryngeal elevation and hyolaryngeal excursion noted upon palpation. Patient with no overt clinical s/sx of aspiration noted during PO trials, again very limited at this time. Recommend NPO pending re-assess, okay for single sips with med's as tolerated. SLP to follow and re-assess in AM as patient is able to tolerate PO without n/v.   Recommendations - Diet: Diet / Liquid Recommendation: To Be Assessed                          Strategies:to be assessed                          Medication Administration: Medication Administration : Whole with Liquid Wash  Plan of Care: Will benefit from Speech Therapy 3 times per week  Post-Acute Therapy: Discharge to a transitional care facility for continued skilled therapy services. and Discharge to home with outpatient or home health for additional skilled therapy services.    See \"Rehab Therapy-Acute\" Patient Summary Report for complete documentation.   "

## 2019-11-13 NOTE — DIETARY
"Nutrition services: Day 2 of admit.  Luis Sepulveda is a 73 y.o. male with admitting DX of NSTEMI  Consult received for MST 2: Poor PO PTA, 2-13 lb weight loss over <1 week    Visit with pt at bedside. Pt reports  lb 2 months ago. Pt reports nausea last 3 weeks and not feeling well off/on due to kidney disease and heart disease last few months. Reduced PO intake last (<75% last few months). Pt declined Boost at this time, agreeable to HS snack. Snack ordered.        Assessment:  Height: 165.1 cm (5' 5\")  Weight: 70.5 kg (155 lb 6.8 oz)  Body mass index is 25.86 kg/m²., BMI classification: Normal/Overweight  Diet/Intake: Renal/Cardiac, No PO documented yet (Pt was NPO yesterday).     Evaluation:   1. PMH: Diabetes, HTN, CKD Stage 5 (not on dialysis), CAD  2. 11.4% weight loss over 2 months (severe) with reduced PO intake (<75% over 2 months) per pt report.   3. Labs: A1c 5.3 (9/2019), K 2.8, BUN 77 (trending down), Creat 4.27 (trending down), Mg/Phos WNL  4. Meds: Lipitor, Lantus, Humalog (SSI), KCl, Phenergan, Senna (given today), Sodium Bicarb       Malnutrition Risk: Pt with severe malnutrition in the context of chronic illness related to heart disease and kidney disease evident by 11.4% weight loss over 2 months (severe) with reduced PO intake (<75% over 2 months) per pt report.     Recommendations/Plan:  1. High protein HS snack   2. Encourage intake of meals  3. Document intake of all meals  as % taken in ADL's to provide interdisciplinary communication across all shifts.   4. Monitor weight.  5. Nutrition rep will continue to see patient for ongoing meal and snack preferences.     RD following          "

## 2019-11-13 NOTE — PROGRESS NOTES
MONITOR SUMMARY    .18/.14/.40     SR 84-98 converted to afib at 1710    Ectopy: rare PAC, rare PVC

## 2019-11-14 ENCOUNTER — APPOINTMENT (OUTPATIENT)
Dept: RADIOLOGY | Facility: MEDICAL CENTER | Age: 74
DRG: 280 | End: 2019-11-14
Attending: INTERNAL MEDICINE
Payer: MEDICARE

## 2019-11-14 LAB
ANION GAP SERPL CALC-SCNC: 11 MMOL/L (ref 0–11.9)
BUN SERPL-MCNC: 45 MG/DL (ref 8–22)
CALCIUM SERPL-MCNC: 7.4 MG/DL (ref 8.5–10.5)
CHLORIDE SERPL-SCNC: 106 MMOL/L (ref 96–112)
CO2 SERPL-SCNC: 24 MMOL/L (ref 20–33)
CREAT SERPL-MCNC: 3.43 MG/DL (ref 0.5–1.4)
ERYTHROCYTE [DISTWIDTH] IN BLOOD BY AUTOMATED COUNT: 45.2 FL (ref 35.9–50)
GLUCOSE BLD-MCNC: 109 MG/DL (ref 65–99)
GLUCOSE BLD-MCNC: 116 MG/DL (ref 65–99)
GLUCOSE BLD-MCNC: 137 MG/DL (ref 65–99)
GLUCOSE BLD-MCNC: 143 MG/DL (ref 65–99)
GLUCOSE BLD-MCNC: 159 MG/DL (ref 65–99)
GLUCOSE SERPL-MCNC: 118 MG/DL (ref 65–99)
HCT VFR BLD AUTO: 25.2 % (ref 42–52)
HGB BLD-MCNC: 8.5 G/DL (ref 14–18)
MAGNESIUM SERPL-MCNC: 1.8 MG/DL (ref 1.5–2.5)
MCH RBC QN AUTO: 29.9 PG (ref 27–33)
MCHC RBC AUTO-ENTMCNC: 33.7 G/DL (ref 33.7–35.3)
MCV RBC AUTO: 88.7 FL (ref 81.4–97.8)
PLATELET # BLD AUTO: 170 K/UL (ref 164–446)
PMV BLD AUTO: 9.5 FL (ref 9–12.9)
POTASSIUM SERPL-SCNC: 3.6 MMOL/L (ref 3.6–5.5)
PTH-INTACT SERPL-MCNC: 503.3 PG/ML (ref 14–72)
RBC # BLD AUTO: 2.84 M/UL (ref 4.7–6.1)
SODIUM SERPL-SCNC: 141 MMOL/L (ref 135–145)
WBC # BLD AUTO: 6.5 K/UL (ref 4.8–10.8)

## 2019-11-14 PROCEDURE — 700111 HCHG RX REV CODE 636 W/ 250 OVERRIDE (IP)

## 2019-11-14 PROCEDURE — 83735 ASSAY OF MAGNESIUM: CPT

## 2019-11-14 PROCEDURE — A9270 NON-COVERED ITEM OR SERVICE: HCPCS | Performed by: STUDENT IN AN ORGANIZED HEALTH CARE EDUCATION/TRAINING PROGRAM

## 2019-11-14 PROCEDURE — 82962 GLUCOSE BLOOD TEST: CPT | Mod: 91

## 2019-11-14 PROCEDURE — 700102 HCHG RX REV CODE 250 W/ 637 OVERRIDE(OP): Performed by: STUDENT IN AN ORGANIZED HEALTH CARE EDUCATION/TRAINING PROGRAM

## 2019-11-14 PROCEDURE — 770020 HCHG ROOM/CARE - TELE (206)

## 2019-11-14 PROCEDURE — 97162 PT EVAL MOD COMPLEX 30 MIN: CPT

## 2019-11-14 PROCEDURE — 92526 ORAL FUNCTION THERAPY: CPT

## 2019-11-14 PROCEDURE — A9270 NON-COVERED ITEM OR SERVICE: HCPCS | Performed by: INTERNAL MEDICINE

## 2019-11-14 PROCEDURE — A9502 TC99M TETROFOSMIN: HCPCS

## 2019-11-14 PROCEDURE — 700111 HCHG RX REV CODE 636 W/ 250 OVERRIDE (IP): Performed by: PHYSICIAN ASSISTANT

## 2019-11-14 PROCEDURE — 90935 HEMODIALYSIS ONE EVALUATION: CPT

## 2019-11-14 PROCEDURE — 99232 SBSQ HOSP IP/OBS MODERATE 35: CPT | Performed by: INTERNAL MEDICINE

## 2019-11-14 PROCEDURE — 80048 BASIC METABOLIC PNL TOTAL CA: CPT

## 2019-11-14 PROCEDURE — 85027 COMPLETE CBC AUTOMATED: CPT

## 2019-11-14 PROCEDURE — 700102 HCHG RX REV CODE 250 W/ 637 OVERRIDE(OP): Performed by: INTERNAL MEDICINE

## 2019-11-14 PROCEDURE — 99233 SBSQ HOSP IP/OBS HIGH 50: CPT | Mod: GC | Performed by: HOSPITALIST

## 2019-11-14 PROCEDURE — 5A1D70Z PERFORMANCE OF URINARY FILTRATION, INTERMITTENT, LESS THAN 6 HOURS PER DAY: ICD-10-PCS | Performed by: INTERNAL MEDICINE

## 2019-11-14 RX ORDER — SODIUM CHLORIDE 9 MG/ML
INJECTION, SOLUTION INTRAVENOUS CONTINUOUS
Status: DISCONTINUED | OUTPATIENT
Start: 2019-11-15 | End: 2019-11-18

## 2019-11-14 RX ORDER — REGADENOSON 0.08 MG/ML
0.4 INJECTION, SOLUTION INTRAVENOUS ONCE
Status: COMPLETED | OUTPATIENT
Start: 2019-11-14 | End: 2019-11-14

## 2019-11-14 RX ORDER — METOPROLOL TARTRATE 50 MG/1
50 TABLET, FILM COATED ORAL TWICE DAILY
Status: DISCONTINUED | OUTPATIENT
Start: 2019-11-14 | End: 2019-11-15

## 2019-11-14 RX ORDER — REGADENOSON 0.08 MG/ML
INJECTION, SOLUTION INTRAVENOUS
Status: COMPLETED
Start: 2019-11-14 | End: 2019-11-14

## 2019-11-14 RX ADMIN — Medication 667 MG: at 11:18

## 2019-11-14 RX ADMIN — SODIUM BICARBONATE 650 MG: 650 TABLET ORAL at 11:18

## 2019-11-14 RX ADMIN — ATORVASTATIN CALCIUM 80 MG: 80 TABLET, FILM COATED ORAL at 17:09

## 2019-11-14 RX ADMIN — MECLIZINE 12.5 MG: 12.5 TABLET ORAL at 11:24

## 2019-11-14 RX ADMIN — HEPARIN SODIUM 3700 UNITS: 1000 INJECTION, SOLUTION INTRAVENOUS; SUBCUTANEOUS at 14:30

## 2019-11-14 RX ADMIN — REGADENOSON 0.4 MG: 0.08 INJECTION, SOLUTION INTRAVENOUS at 10:27

## 2019-11-14 RX ADMIN — POTASSIUM CHLORIDE 20 MEQ: 20 TABLET, EXTENDED RELEASE ORAL at 06:11

## 2019-11-14 RX ADMIN — SODIUM BICARBONATE 650 MG: 650 TABLET ORAL at 06:11

## 2019-11-14 RX ADMIN — METOPROLOL TARTRATE 25 MG: 25 TABLET, FILM COATED ORAL at 06:08

## 2019-11-14 RX ADMIN — INSULIN LISPRO 1 UNITS: 100 INJECTION, SOLUTION INTRAVENOUS; SUBCUTANEOUS at 23:29

## 2019-11-14 RX ADMIN — PROMETHAZINE HYDROCHLORIDE 12.5 MG: 25 TABLET ORAL at 18:34

## 2019-11-14 RX ADMIN — PRASUGREL 10 MG: 10 TABLET, FILM COATED ORAL at 06:12

## 2019-11-14 RX ADMIN — ASPIRIN 81 MG: 81 TABLET, COATED ORAL at 06:11

## 2019-11-14 RX ADMIN — METOPROLOL TARTRATE 50 MG: 50 TABLET, FILM COATED ORAL at 17:09

## 2019-11-14 RX ADMIN — HEPARIN SODIUM 25000 UNITS: 5000 INJECTION, SOLUTION INTRAVENOUS at 01:37

## 2019-11-14 RX ADMIN — METHIMAZOLE 5 MG: 5 TABLET ORAL at 06:12

## 2019-11-14 RX ADMIN — Medication 667 MG: at 17:08

## 2019-11-14 RX ADMIN — Medication 667 MG: at 06:11

## 2019-11-14 ASSESSMENT — COGNITIVE AND FUNCTIONAL STATUS - GENERAL
SUGGESTED CMS G CODE MODIFIER MOBILITY: CJ
WALKING IN HOSPITAL ROOM: A LITTLE
MOBILITY SCORE: 21
CLIMB 3 TO 5 STEPS WITH RAILING: A LITTLE
STANDING UP FROM CHAIR USING ARMS: A LITTLE

## 2019-11-14 ASSESSMENT — ENCOUNTER SYMPTOMS
MUSCULOSKELETAL NEGATIVE: 1
NERVOUS/ANXIOUS: 0
COUGH: 0
TROUBLE SWALLOWING: 0
FEVER: 0
DIAPHORESIS: 0
PALPITATIONS: 0
CHILLS: 0
ABDOMINAL DISTENTION: 0
CHEST TIGHTNESS: 0
ABDOMINAL PAIN: 0
WEAKNESS: 1
COLOR CHANGE: 0
EYES NEGATIVE: 1
NUMBNESS: 0
HEADACHES: 0
SHORTNESS OF BREATH: 0
DIARRHEA: 0
AGITATION: 0
NAUSEA: 1
CARDIOVASCULAR NEGATIVE: 1
FOCAL WEAKNESS: 0
RESPIRATORY NEGATIVE: 1
BLOOD IN STOOL: 0
DIZZINESS: 0
CONFUSION: 0
PSYCHIATRIC NEGATIVE: 1

## 2019-11-14 ASSESSMENT — GAIT ASSESSMENTS
GAIT LEVEL OF ASSIST: MINIMAL ASSIST
DISTANCE (FEET): 75
DEVIATION: DECREASED BASE OF SUPPORT;OTHER (COMMENT)

## 2019-11-14 NOTE — THERAPY
Physical Therapy Contact Note    PT eval received and acknowledged. Per chart review patient pending cardiac catheterization today. Will hold PT eval until after cardiac diagnostics/intervention complete.    Jessica Holloway, PT, DPT  [phone]

## 2019-11-14 NOTE — PROGRESS NOTES
Assumed care of patient and bedside report received from previous RN. Plan of care discussed with patient. All concerns voiced at this time. Patient is alert and oriented x 4. VSS. Patient educated on importance of calling, bed controls on, bed locked and in lowest position, call light with patient. Patient is in rhythm: SR. Plan for stress test today.

## 2019-11-14 NOTE — PROGRESS NOTES
[facility] Nephrology Daily Progress Note    Date of Service  11/14/2019    Chief Complaint  Follow up New ESRD    Interval Problem Update  This is a 73-year-old  male with past medical history significant for chronic kidney disease stage V, not yet   initiated on hemodialysis, hypertension, diabetes mellitus type 2, coronary artery disease status post stent in the past, anemia secondary to chronic kidney disease, CKD bone mineral disorder/renal osteodystrophy, hyperparathyroidism treated with temozolomide, who was admitted with complaints of chest discomfort and shortness of breath x4 days and was found to have a non-ST elevation MI and now noted to have worsening renal function as well as severe metabolic acidosis.  Patient reports that for the past 4 days, he has not been feeling well.  He has been having this vague chest discomfort as well as some shortness of breath.  He came to the ER for further evaluation last night and was found to have an elevated troponin level consistent with a non-ST elevation MI.  He has been seen by cardiology who recommends medical management at this time.  Patient does have a history of chronic kidney disease stage V, for which he follows with [facility].  His GFR has been around 9-10 mL per minute, but he has been asymptomatic as an outpatient, and so, he has not yet needed to initiate dialysis.  He has a history of a failed AV fistula and he was referred for vascular surgery to have a new AV fistula created and he is in the process of this.  The patient reports that at this time, he does not have any shortness of breath and his chest pain has resolved.  His labs show an elevated BUN of 129, creatinine is 6.53 and a GFR of 8 mL per minute and he has a significant metabolic acidosis with a serum bicarbonate level of 7 and this was a level that I was at last night on admission as well.  He only recently was started on a bicarbonate drip with one-half normal  saline with 75 mEq of sodium bicarbonate at 75 mL an hour by the primary service.  His troponin level has peaked at 816 and is now trending down.    DAILY NEPHROLOGY SUMMARY  11/13/19--No events, tunneled HD catheter placed yest by IR and HD initiated, BP low during HD and no fluid removed, pt feels nauseous and weak today, BP stable, acidosis improved, serum K low, BUN improved with HD, getting PRBC transfusion today  11/14/19--Hd treatment #3 today, he reports occasional nausea, and sob.  He reports tolerating HD well and believes it is helping with SOB.      Review of Systems  Review of Systems   Constitutional: Positive for malaise/fatigue. Negative for chills and fever.   HENT: Negative.    Eyes: Negative.    Respiratory: Negative.    Cardiovascular: Negative.    Gastrointestinal: Positive for nausea. Negative for abdominal pain and diarrhea.   Genitourinary: Negative.    Musculoskeletal: Negative.    Skin: Positive for itching. Negative for rash.   Neurological: Positive for weakness. Negative for dizziness, focal weakness and headaches.   Endo/Heme/Allergies: Negative.    Psychiatric/Behavioral: Negative.         Physical Exam  Temp:  [36.7 °C (98.1 °F)-37.1 °C (98.8 °F)] 36.9 °C (98.4 °F)  Pulse:  [83-97] 83  Resp:  [16-18] 16  BP: (115-143)/(47-68) 115/57  SpO2:  [93 %-98 %] 98 %    Physical Exam  Vitals signs and nursing note reviewed.   Constitutional:       General: He is not in acute distress.     Appearance: Normal appearance. He is ill-appearing.   HENT:      Head: Normocephalic and atraumatic.      Right Ear: External ear normal.      Left Ear: External ear normal.      Nose: Nose normal.      Mouth/Throat:      Mouth: Mucous membranes are moist.      Pharynx: Oropharynx is clear. No oropharyngeal exudate.   Eyes:      General: No scleral icterus.     Extraocular Movements: Extraocular movements intact.      Conjunctiva/sclera: Conjunctivae normal.   Neck:      Musculoskeletal: Normal range of motion  and neck supple. No muscular tenderness.   Cardiovascular:      Rate and Rhythm: Normal rate and regular rhythm.      Heart sounds: Normal heart sounds. No murmur.   Pulmonary:      Effort: Pulmonary effort is normal. No respiratory distress.      Breath sounds: Normal breath sounds. No wheezing or rales.   Abdominal:      General: Abdomen is flat. Bowel sounds are normal. There is no distension.      Palpations: Abdomen is soft.      Tenderness: There is no tenderness.   Musculoskeletal: Normal range of motion.         General: No swelling.   Lymphadenopathy:      Cervical: No cervical adenopathy.   Skin:     General: Skin is warm and dry.      Findings: No rash.   Neurological:      General: No focal deficit present.      Mental Status: He is alert and oriented to person, place, and time.      Motor: No weakness.   Psychiatric:         Mood and Affect: Mood normal.         Behavior: Behavior normal.         Fluids    Intake/Output Summary (Last 24 hours) at 11/14/2019 1201  Last data filed at 11/13/2019 1808  Gross per 24 hour   Intake 500 ml   Output 1329 ml   Net -829 ml       Laboratory  Recent Labs     11/12/19  0557 11/13/19  0244 11/13/19  1407 11/14/19  0207   WBC 8.0 6.3  --  6.5   RBC 2.50* 2.21*  --  2.84*   HEMOGLOBIN 7.7* 6.8* 8.6* 8.5*   HEMATOCRIT 23.3* 19.5*  --  25.2*   MCV 93.2 88.2  --  88.7   MCH 30.8 30.8  --  29.9   MCHC 33.0* 34.9  --  33.7   RDW 49.4 43.7  --  45.2   PLATELETCT 239 207  --  170   MPV 9.1 9.2  --  9.5     Recent Labs     11/13/19  0244 11/13/19  1407 11/14/19  0207   SODIUM 143 144 141   POTASSIUM 2.8* 3.2* 3.6   CHLORIDE 109 109 106   CO2 19* 24 24   GLUCOSE 87 134* 118*   BUN 77* 79* 45*   CREATININE 4.27* 4.74* 3.43*   CALCIUM 7.8* 7.3* 7.4*     Recent Labs     11/12/19  1153 11/12/19  1749 11/13/19  1944   APTT 77.4* 86.2* 94.3*   INR 1.44*  --   --      No results for input(s): NTPROBNP in the last 72 hours.        Assessment:  1. PAT on CKD Stage V--progression of his  CKD, now new ESRD  --I had a long talk with patient and he has been reluctant to start HD in the past but he is now agreeable  --R Chest PC placed 11/12 and HD initiated  --3 consecutive HD treatments completed 11/14 will transition to TTHS schedule until we know outpt schedule.    2. NSTEMI--medical management for now per cardiology  3. Metabolic Acidosis--improved with bicarb drip and dialysis  --bicarb drip discontinued   4. HTN--BP low during HD yest, now stable  5. DM II--per primary svc  6. Renal Osteodystrophy/CKD Bone Mineral Disorder--phos elevated  --calcium acetate started   7. Hyperthyroidism--treated with methimazole as outpt and pt reports that he is still on this medication  8. Anemia--secondary to CKD  --No need for IV Iron  --Getting PRBC transfusion 11/13  9. Secondary Hyperparathyroidism  10. SOB--resolved  11. Nausea--anti-emetics per primary svc  12. Pruritis--likely related to hyperphosphatemia  13. Hypokalemia--additional supplementation ordered and will adjust K bath on dialysis  14. Moderate Protein-Calorie Malnutrition     Plan:  --HD # 3 again today for clearance  --Then start HD q TTHS and prn schedule, may need to adjust for outpt schedule   --Pt will need outpt HD arrangements  --Quantiferon gold ordered to facilitate outpt HD placement  --Cardiology following for NSTEMI  --Epogen with HD  --Transfuse PRN for hgb less than 7  --Continue phoslo with meals  --Discontinue sodium bicarbonate   --Check iPTH level -- pending   --Pt is on methimazole as outpt for treatment of hyperthyroidism by his PCP, primary svc to manage while pt is admitted

## 2019-11-14 NOTE — THERAPY
SPEECH PATHOLOGY:  Attempted to see patient this am at 0945, but he was down having stress test.  Came to see patient at 1315; however, he was in dialysis.  RN will let SLP know when he returns.

## 2019-11-14 NOTE — PROGRESS NOTES
Cardiology Follow Up Progress Note    Date of Service  11/14/2019    Attending Physician  KEREN Canada M.D.    Chief Complaint   Chest pain    Cardiology consult   Elevated trop    HPI  Adalid Sepulveda is a 73 y.o. male admitted 11/11/2019 with chest pain. History of previous stenting at [facility] with Dr. Mann.    Past medical history of ESRD previous placement of AV fistula and failed, hypertension and diabetes.    Interim Events  11/14/19: Patient is resting in bed, sob has improved. Received another round of dialysis yesterday. hbg is stable overnight at 8, after transfusion. Reverted back into NSR after 4 hrs of afib.     11/13/19: chest has resolved. He received one round of hemodialysis yesterday and plan for today per nurse. Denies any SOB or dizziness. Vital sign stable. Converted to afib overnight and he has history of afib per patient.     11/12/19: currently his chest pain has resolved. Denies any SOB, dizziness or palpitations. SR . Patient is hesitant regarding dialysis and quality of life. Continue heparin gtt. He stated his shortness of breath and chest pain started about a month ago and progressing.     Trop T 816  Creatinine 6.53  GFR 8  CO2 7  Anion gap 17      Review of Systems  Review of Systems   Constitutional: Negative for chills, diaphoresis and fever.   HENT: Negative for nosebleeds and trouble swallowing.    Respiratory: Negative for cough, chest tightness and shortness of breath.    Cardiovascular: Negative for chest pain, palpitations and leg swelling.   Gastrointestinal: Negative for abdominal distention, abdominal pain and blood in stool.   Genitourinary: Negative for hematuria.   Skin: Negative for color change.   Neurological: Negative for dizziness, syncope and numbness.   Psychiatric/Behavioral: Negative for agitation and confusion. The patient is not nervous/anxious.        Vital signs in last 24 hours  Temp:  [36.7 °C (98.1 °F)-37.4 °C  (99.3 °F)] 37.1 °C (98.8 °F)  Pulse:  [85-97] 87  Resp:  [16-18] 16  BP: (120-143)/(47-68) 133/60  SpO2:  [93 %-98 %] 96 %    Physical Exam  Physical Exam  Vitals signs and nursing note reviewed.   Constitutional:       Appearance: Normal appearance.   HENT:      Head: Normocephalic and atraumatic.   Eyes:      Pupils: Pupils are equal, round, and reactive to light.   Neck:      Musculoskeletal: Normal range of motion.   Cardiovascular:      Rate and Rhythm: Normal rate and regular rhythm.      Heart sounds: Normal heart sounds. No murmur.   Pulmonary:      Effort: Pulmonary effort is normal.      Breath sounds: Normal breath sounds.   Abdominal:      General: Abdomen is flat.   Skin:     General: Skin is warm and dry.   Neurological:      General: No focal deficit present.      Mental Status: He is alert and oriented to person, place, and time.   Psychiatric:         Mood and Affect: Mood normal.         Behavior: Behavior normal.         Thought Content: Thought content normal.         Judgment: Judgment normal.         Lab Review  Lab Results   Component Value Date/Time    WBC 6.5 11/14/2019 02:07 AM    RBC 2.84 (L) 11/14/2019 02:07 AM    HEMOGLOBIN 8.5 (L) 11/14/2019 02:07 AM    HEMATOCRIT 25.2 (L) 11/14/2019 02:07 AM    MCV 88.7 11/14/2019 02:07 AM    MCH 29.9 11/14/2019 02:07 AM    MCHC 33.7 11/14/2019 02:07 AM    MPV 9.5 11/14/2019 02:07 AM      Lab Results   Component Value Date/Time    SODIUM 141 11/14/2019 02:07 AM    POTASSIUM 3.6 11/14/2019 02:07 AM    CHLORIDE 106 11/14/2019 02:07 AM    CO2 24 11/14/2019 02:07 AM    GLUCOSE 118 (H) 11/14/2019 02:07 AM    BUN 45 (H) 11/14/2019 02:07 AM    CREATININE 3.43 (H) 11/14/2019 02:07 AM      Lab Results   Component Value Date/Time    ASTSGOT 13 11/13/2019 02:44 AM    ALTSGPT 12 11/13/2019 02:44 AM     Lab Results   Component Value Date/Time    CHOLSTRLTOT 133 04/08/2019 11:56 AM    LDL 61 04/08/2019 11:56 AM    HDL 26 (A) 04/08/2019 11:56 AM    TRIGLYCERIDE 232  (H) 04/08/2019 11:56 AM    TROPONINT 749 (H) 11/12/2019 11:53 AM    TROPONINT 742 (H) 11/12/2019 11:53 AM       No results for input(s): NTPROBNP in the last 72 hours.    Cardiac Imaging and Procedures Review  EKG:    SINUS RHYTHM   RBBB AND LAFB   ABNORMAL T, CONSIDER ISCHEMIA, LATERAL LEADS   Compared to ECG 11/12/2019 00:48:53   No significant changes       Echocardiogram:    11/12/19  Thin and hypokinetic mid anteroseptal  wall.  Low normal left ventricular systolic function.  Left ventricular ejection fraction is visually estimated to be 50-55%.  Normal left ventricular chamber size.  Structurally normal aortic and mitral valve without significant   stenosis or regurgitation.  Normal left atrial size.  Unable to estimate pulmonary artery pressure due to an inadequate   tricuspid regurgitant jet.  Normal inferior vena cava size and inspiratory collapse.  Normal right ventricular size and systolic function.  Normal pericardium without effusion.       Assessment/Plan  No new Assessment & Plan notes have been filed under this hospital service since the last note was generated.  Service: Cardiology    1. NSTEMI:   - trop T peaked at 829, asymptomatic and trop treading down   - records from [facility] pending   - MPI today, please keep NPO at midnight.    - ECHO showed ef 50-55%. Thin and hypokinetic mid anteroseptal  wall.  - continue asa and effient 10mg qd  - continue atorvastatin 40mg qd and metoprolol 25mg BID     2. ESRD:  - has tunneled cath in place now, plan for HD today      3. Atrial fibrillation:   - reverted back to NSR  -  Increased metoprolol 25mg BID   - stop heparin now, will need NOAC prior to discharge    4. Hypokalemia:  - improved  - K 3.4 this morning  - defer to nephrology with ESRD    5. Anemia:  - hgb 8.5 after transfusion  - denies any blood in stool or urine   - most likely due to ERSD       Thank you for allowing me to participate in the care of this patient.  I will continue to follow this  patient    Please contact me with any questions.    BABAR Perry   [facility]

## 2019-11-14 NOTE — PROGRESS NOTES
[facility] Services Progress Note    Hemodialysis treatment ordered today per Dr. Agee x 3 hours. Treatment initiated at 1218, ended at 1519.     Patient tolerated tx; see paper flow sheet for details.     Net UF 1000 mL.     Post tx, CVC RIJ flushed with saline then locked with heparin 1000 units/mL per designated amount in each wing then clamped and capped. Aspirate heparin prior to next CVC use.    Report given to Primary RN.

## 2019-11-14 NOTE — PROGRESS NOTES
Received report from day shift RNSujata. Pt sitting up at edge of bed and does not appear to be in distress. Call light and personal belongings within reach, bed in lowest locked position. POC addressed, white board updated. No further questions at this time.

## 2019-11-14 NOTE — PROGRESS NOTES
Hemodialysis ordered by Dr. Agee. Treatment started at 1555 and ended at 1808 d/t clotting dialyzer and lines. Blood returned. Pt off 17 mins early. Dr. Hunter notified and ok to stop tx now. Pt stable, vss, no c/o post tx. See flow sheets for details. Net  ml. Reported to NIMA Sher RN.

## 2019-11-15 ENCOUNTER — APPOINTMENT (OUTPATIENT)
Dept: CARDIOLOGY | Facility: MEDICAL CENTER | Age: 74
DRG: 280 | End: 2019-11-15
Attending: NURSE PRACTITIONER
Payer: MEDICARE

## 2019-11-15 PROBLEM — N25.81 SECONDARY HYPERPARATHYROIDISM OF RENAL ORIGIN (HCC): Status: ACTIVE | Noted: 2019-11-15

## 2019-11-15 PROBLEM — Z99.2 END STAGE RENAL DISEASE ON DIALYSIS (HCC): Status: ACTIVE | Noted: 2019-08-13

## 2019-11-15 PROBLEM — N18.6 END STAGE RENAL DISEASE ON DIALYSIS (HCC): Status: ACTIVE | Noted: 2019-08-13

## 2019-11-15 LAB
ALBUMIN SERPL BCP-MCNC: 3.3 G/DL (ref 3.2–4.9)
ALBUMIN/GLOB SERPL: 1.4 G/DL
ALP SERPL-CCNC: 149 U/L (ref 30–99)
ALT SERPL-CCNC: 15 U/L (ref 2–50)
ANION GAP SERPL CALC-SCNC: 10 MMOL/L (ref 0–11.9)
AST SERPL-CCNC: 22 U/L (ref 12–45)
BASOPHILS # BLD AUTO: 0.4 % (ref 0–1.8)
BASOPHILS # BLD: 0.03 K/UL (ref 0–0.12)
BILIRUB SERPL-MCNC: 0.7 MG/DL (ref 0.1–1.5)
BUN SERPL-MCNC: 25 MG/DL (ref 8–22)
CALCIUM SERPL-MCNC: 8.2 MG/DL (ref 8.5–10.5)
CHLORIDE SERPL-SCNC: 105 MMOL/L (ref 96–112)
CO2 SERPL-SCNC: 26 MMOL/L (ref 20–33)
CREAT SERPL-MCNC: 2.75 MG/DL (ref 0.5–1.4)
EOSINOPHIL # BLD AUTO: 0.15 K/UL (ref 0–0.51)
EOSINOPHIL NFR BLD: 1.9 % (ref 0–6.9)
ERYTHROCYTE [DISTWIDTH] IN BLOOD BY AUTOMATED COUNT: 45.6 FL (ref 35.9–50)
GAMMA INTERFERON BACKGROUND BLD IA-ACNC: 0.07 IU/ML
GLOBULIN SER CALC-MCNC: 2.4 G/DL (ref 1.9–3.5)
GLUCOSE BLD-MCNC: 105 MG/DL (ref 65–99)
GLUCOSE BLD-MCNC: 112 MG/DL (ref 65–99)
GLUCOSE BLD-MCNC: 134 MG/DL (ref 65–99)
GLUCOSE SERPL-MCNC: 102 MG/DL (ref 65–99)
HCT VFR BLD AUTO: 27 % (ref 42–52)
HGB BLD-MCNC: 9.2 G/DL (ref 14–18)
IMM GRANULOCYTES # BLD AUTO: 0.09 K/UL (ref 0–0.11)
IMM GRANULOCYTES NFR BLD AUTO: 1.1 % (ref 0–0.9)
LYMPHOCYTES # BLD AUTO: 1.18 K/UL (ref 1–4.8)
LYMPHOCYTES NFR BLD: 14.8 % (ref 22–41)
M TB IFN-G BLD-IMP: NEGATIVE
M TB IFN-G CD4+ BCKGRND COR BLD-ACNC: 0 IU/ML
MAGNESIUM SERPL-MCNC: 1.8 MG/DL (ref 1.5–2.5)
MCH RBC QN AUTO: 30.7 PG (ref 27–33)
MCHC RBC AUTO-ENTMCNC: 34.1 G/DL (ref 33.7–35.3)
MCV RBC AUTO: 90 FL (ref 81.4–97.8)
MITOGEN IGNF BCKGRD COR BLD-ACNC: >10 IU/ML
MONOCYTES # BLD AUTO: 1.62 K/UL (ref 0–0.85)
MONOCYTES NFR BLD AUTO: 20.4 % (ref 0–13.4)
NEUTROPHILS # BLD AUTO: 4.88 K/UL (ref 1.82–7.42)
NEUTROPHILS NFR BLD: 61.4 % (ref 44–72)
NRBC # BLD AUTO: 0.03 K/UL
NRBC BLD-RTO: 0.4 /100 WBC
PLATELET # BLD AUTO: 173 K/UL (ref 164–446)
PMV BLD AUTO: 9.5 FL (ref 9–12.9)
POTASSIUM SERPL-SCNC: 4.1 MMOL/L (ref 3.6–5.5)
PROT SERPL-MCNC: 5.7 G/DL (ref 6–8.2)
QFT TB2 - NIL TBQ2: 0 IU/ML
RBC # BLD AUTO: 3 M/UL (ref 4.7–6.1)
SODIUM SERPL-SCNC: 141 MMOL/L (ref 135–145)
WBC # BLD AUTO: 8 K/UL (ref 4.8–10.8)

## 2019-11-15 PROCEDURE — A9270 NON-COVERED ITEM OR SERVICE: HCPCS | Performed by: INTERNAL MEDICINE

## 2019-11-15 PROCEDURE — 700102 HCHG RX REV CODE 250 W/ 637 OVERRIDE(OP): Performed by: INTERNAL MEDICINE

## 2019-11-15 PROCEDURE — 80053 COMPREHEN METABOLIC PANEL: CPT

## 2019-11-15 PROCEDURE — 92526 ORAL FUNCTION THERAPY: CPT

## 2019-11-15 PROCEDURE — 700102 HCHG RX REV CODE 250 W/ 637 OVERRIDE(OP): Performed by: STUDENT IN AN ORGANIZED HEALTH CARE EDUCATION/TRAINING PROGRAM

## 2019-11-15 PROCEDURE — A9270 NON-COVERED ITEM OR SERVICE: HCPCS | Performed by: STUDENT IN AN ORGANIZED HEALTH CARE EDUCATION/TRAINING PROGRAM

## 2019-11-15 PROCEDURE — 36415 COLL VENOUS BLD VENIPUNCTURE: CPT

## 2019-11-15 PROCEDURE — 770020 HCHG ROOM/CARE - TELE (206)

## 2019-11-15 PROCEDURE — 99232 SBSQ HOSP IP/OBS MODERATE 35: CPT | Performed by: INTERNAL MEDICINE

## 2019-11-15 PROCEDURE — 85025 COMPLETE CBC W/AUTO DIFF WBC: CPT

## 2019-11-15 PROCEDURE — 83735 ASSAY OF MAGNESIUM: CPT

## 2019-11-15 PROCEDURE — 99232 SBSQ HOSP IP/OBS MODERATE 35: CPT | Mod: GC | Performed by: INTERNAL MEDICINE

## 2019-11-15 PROCEDURE — 700105 HCHG RX REV CODE 258: Performed by: NURSE PRACTITIONER

## 2019-11-15 PROCEDURE — 700111 HCHG RX REV CODE 636 W/ 250 OVERRIDE (IP): Performed by: STUDENT IN AN ORGANIZED HEALTH CARE EDUCATION/TRAINING PROGRAM

## 2019-11-15 PROCEDURE — 82962 GLUCOSE BLOOD TEST: CPT

## 2019-11-15 RX ORDER — METOPROLOL TARTRATE 50 MG/1
100 TABLET, FILM COATED ORAL TWICE DAILY
Status: DISCONTINUED | OUTPATIENT
Start: 2019-11-15 | End: 2019-11-18 | Stop reason: HOSPADM

## 2019-11-15 RX ORDER — CALCITRIOL 0.25 UG/1
0.5 CAPSULE, LIQUID FILLED ORAL DAILY
Status: DISCONTINUED | OUTPATIENT
Start: 2019-11-16 | End: 2019-11-15

## 2019-11-15 RX ORDER — CLOPIDOGREL BISULFATE 75 MG/1
75 TABLET ORAL DAILY
Status: DISCONTINUED | OUTPATIENT
Start: 2019-11-16 | End: 2019-11-18 | Stop reason: HOSPADM

## 2019-11-15 RX ORDER — CALCITRIOL 0.25 UG/1
0.25 CAPSULE, LIQUID FILLED ORAL
Status: DISCONTINUED | OUTPATIENT
Start: 2019-11-18 | End: 2019-11-18 | Stop reason: HOSPADM

## 2019-11-15 RX ORDER — MAGNESIUM SULFATE HEPTAHYDRATE 40 MG/ML
2 INJECTION, SOLUTION INTRAVENOUS ONCE
Status: COMPLETED | OUTPATIENT
Start: 2019-11-15 | End: 2019-11-15

## 2019-11-15 RX ADMIN — Medication 667 MG: at 12:11

## 2019-11-15 RX ADMIN — METOPROLOL TARTRATE 50 MG: 50 TABLET, FILM COATED ORAL at 05:10

## 2019-11-15 RX ADMIN — ASPIRIN 81 MG: 81 TABLET, COATED ORAL at 05:10

## 2019-11-15 RX ADMIN — SODIUM CHLORIDE 83 ML: 9 INJECTION, SOLUTION INTRAVENOUS at 05:13

## 2019-11-15 RX ADMIN — METHIMAZOLE 5 MG: 5 TABLET ORAL at 05:10

## 2019-11-15 RX ADMIN — Medication 667 MG: at 16:57

## 2019-11-15 RX ADMIN — ATORVASTATIN CALCIUM 80 MG: 80 TABLET, FILM COATED ORAL at 16:59

## 2019-11-15 RX ADMIN — Medication 667 MG: at 05:10

## 2019-11-15 RX ADMIN — INSULIN GLARGINE 14 UNITS: 100 INJECTION, SOLUTION SUBCUTANEOUS at 16:52

## 2019-11-15 RX ADMIN — SODIUM CHLORIDE: 9 INJECTION, SOLUTION INTRAVENOUS at 18:44

## 2019-11-15 RX ADMIN — PRASUGREL 10 MG: 10 TABLET, FILM COATED ORAL at 05:10

## 2019-11-15 RX ADMIN — POTASSIUM CHLORIDE 20 MEQ: 20 TABLET, EXTENDED RELEASE ORAL at 05:10

## 2019-11-15 RX ADMIN — MAGNESIUM SULFATE IN WATER 2 G: 40 INJECTION, SOLUTION INTRAVENOUS at 09:12

## 2019-11-15 RX ADMIN — METOPROLOL TARTRATE 100 MG: 50 TABLET, FILM COATED ORAL at 16:57

## 2019-11-15 ASSESSMENT — ENCOUNTER SYMPTOMS
PSYCHIATRIC NEGATIVE: 1
SHORTNESS OF BREATH: 0
CARDIOVASCULAR NEGATIVE: 1
NAUSEA: 0
VOMITING: 0
WHEEZING: 0
RESPIRATORY NEGATIVE: 1
DIARRHEA: 0
DIZZINESS: 0
ABDOMINAL PAIN: 0
WEAKNESS: 1
FEVER: 0
MUSCULOSKELETAL NEGATIVE: 1
HEADACHES: 0
COUGH: 0
CHILLS: 0
EYES NEGATIVE: 1
CHEST TIGHTNESS: 0
PALPITATIONS: 0
FOCAL WEAKNESS: 0

## 2019-11-15 NOTE — DISCHARGE PLANNING
Outpatient Dialysis Placement Confirmation    Patient has been placed and confirmed at:    [facility]  [address] [place] [id number]    Ph# [phone]  Fax# [phone]    Schedule: Tuesday, Thursday, Saturday   Time:1:15pm    Patient can start on Tuesday and needs to be there by 12:15pm.  Pending Quantiferon and financial clearance.    Follow up call made to Alivia STILL) and relayed outpatient dialysis placement confirmation. Follow up message to  to notify of placement. Provided patient dialysis schedule welcome letter bedside.    Becca Bojorquez- Dialysis Coordinator  [facility] Ph# [phone]

## 2019-11-15 NOTE — PROGRESS NOTES
Received report from Day shift RNSujata. Pt is resting in bed with family at bedside. Pt does not appear to be in any pain or distress. Bed in lowest locked position, call light and personal belongings within reach. POC addressed, white board updated. No further questions at this time.

## 2019-11-15 NOTE — DISCHARGE PLANNING
Care Transition Team Assessment     RN LAZARO met with patient at bedside. Patient lives with his ex-wife in a single story home and was independent with all ADL's and IADL's prior to admit. Patient drives self to appointments.  Patient is pending a dialysis chair and states he is able to drive self to and from dialysis without issue.   Anticipate no needs at discharge.       Information Source  Orientation : Oriented x 4  Information Given By: Patient  Who is responsible for making decisions for patient? : Patient    Readmission Evaluation  Is this a readmission?: No    Elopement Risk  Legal Hold: No  Ambulatory or Self Mobile in Wheelchair: Yes  Disoriented: No  Psychiatric Symptoms: None  History of Wandering: No  Elopement this Admit: No  Vocalizing Wanting to Leave: No  Displays Behaviors, Body Language Wanting to Leave: No-Not at Risk for Elopement  Elopement Risk: Not at Risk for Elopement    Interdisciplinary Discharge Planning  Does Admitting Nurse Feel This Could be a Complex Discharge?: No  Primary Care Physician: Patito Edgar  Lives with - Patient's Self Care Capacity: Significant Other  Patient or legal guardian wants to designate a caregiver (see row info): No  Support Systems: Friends / Neighbors  Housing / Facility: 1 Story House  Do You Take your Prescribed Medications Regularly: Yes  Able to Return to Previous ADL's: Yes  Mobility Issues: No  Prior Services: None  Patient Expects to be Discharged to:: home  Assistance Needed: No  Durable Medical Equipment: Not Applicable    Discharge Preparedness  What is your plan after discharge?: Home with help  What are your discharge supports?: Other (comment)(ex-wife)  Prior Functional Level: Ambulatory, Independent with Activities of Daily Living, Drives Self, Independent with Medication Management  Difficulity with ADLs: None  Difficulity with IADLs: None    Functional Assesment  Prior Functional Level: Ambulatory, Independent with Activities of Daily  Living, Drives Self, Independent with Medication Management    Finances  Financial Barriers to Discharge: No  Prescription Coverage: Yes    Vision / Hearing Impairment  Vision Impairment : Yes  Right Eye Vision: Wears Glasses  Left Eye Vision: Wears Glasses  Hearing Impairment : No    Domestic Abuse  Have you ever been the victim of abuse or violence?: No  Physical Abuse or Sexual Abuse: No  Verbal Abuse or Emotional Abuse: No  Possible Abuse Reported to:: Not Applicable    Psychological Assessment  History of Psychiatric Problems: No    Anticipated Discharge Information  Anticipated discharge disposition: Home  Discharge Address: [address] [place] [address], [place] [id number]  Discharge Contact Phone Number: [phone]

## 2019-11-15 NOTE — PROGRESS NOTES
Internal Medicine Interval Note  Note Author: Jenny Holland M.D.     Name Luis Sepulveda     1945   Age/Sex 73 y.o. male   MRN [id number]   Code Status DNAR/ Intubation ok      After 5PM or if no immediate response to page, please call for cross-coverage  Attending/Team: /shirley  See Patient List for primary contact information  Call [phone] to page    1st Call - Day Intern (R1):    2nd Call - Day Sr. Resident (R2/R3):            Reason for interval visit  (Principal Problem)   Chest pain with NSTEMI     Interval Problem Daily Status Update  (24 hours, problem oriented, brief subjective history, new lab/imaging data pertinent to that problem)     Mr Sepulveda is a 74yo male who came with the complaint of chest pain x 3 weeks and based on high troponin levels( 829) and EKG findings of ST depression in anterolateral leads is diagnosed with NSTEMI Patient has a PMH significant for CKD Stage V, HTN, DM II, CAD s/p stent , anemia secondary to CKD and  Hyperthyroidism treated with methimazole.    S: Patient is stable , alert and oriented. Denies chest pain ,SOB or palpitations. Vitals stable . Sinus rhythm. Metoprolol dose has been increased form 12.5 to 25 mg BID. Patient has h/o hyperthyroidism but not complaint with his medication. Educated about hyperthyroidism.PAtient understands and agrees to take the medication . Started back on Methimazole 5 mg daily ( PCP notes chart review as the source) . Need to follow up with the primary care/endocrineology after discharge.    Patient is on HD. Today is day 3. Nephrology on board .   Heparin gtt held by the cardiology team.PAtient underwent cardiac nuclear stress test which was abnormal with non reversible ischemic changes ( consistent  with infarction)      ROS   Constitutional: Negative for chills, fever and weight loss.   HENT: Negative for ear pain, hearing loss and tinnitus.    Eyes: Negative for blurred vision,  double vision and photophobia.   Respiratory: Positive for productive cough Negative for hemoptysis and wheezing.    Cardiovascular: Denies chest pain . Negative for palpitations, orthopnea, claudication and leg swelling.   Gastrointestinal: Negative for abdominal pain, constipation, diarrhea, heartburn but postive for nausea  Genitourinary: Negative for dysuria and urgency.   Musculoskeletal: Negative for myalgias.   Skin: Negative for rash.   Neurological: Negative for dizziness, tingling, sensory change, speech change and headaches.       Disposition/Barriers to discharge  NSTEMI with chest pain     Consultants/Specialty  Cardiology Dr.Benjamin Kalyani MOTA  Nephrology Dr.Soni YUKI Agee  PCP: Patito Edgar M.D.      Quality Measures  Quality-Core Measures   Reviewed items::  EKG reviewed, Labs reviewed, Medications reviewed and Radiology images reviewed  Tomas catheter::  No Tomas  DVT prophylaxis pharmacological::  Heparin (heparin drip)          Physical Exam       Vitals:    11/14/19 0843 11/14/19 1137 11/14/19 1214 11/14/19 1525   BP: 115/57 136/62 138/67 133/67   Pulse: 83 96 93 77   Resp: 16 16 18 18   Temp: 36.9 °C (98.4 °F) 36.8 °C (98.3 °F) 37 °C (98.6 °F) 36.8 °C (98.2 °F)   TempSrc: Temporal Temporal Temporal Temporal   SpO2: 98% 99%     Weight:       Height:         Body mass index is 25.46 kg/m². Weight: 69.4 kg (153 lb)  Oxygen Therapy:  Pulse Oximetry: 99 %, O2 (LPM): 0, O2 Delivery: None (Room Air)    Physical Exam    Constitutional: He is oriented to person, place, and time and well-developed, well-nourished, and in no distress. No distress.   HENT:   Head: Normocephalic and atraumatic.   Mouth/Throat: No oropharyngeal exudate.   Eyes: Pupils are equal, round, and reactive to light. EOM are normal.   Neck: Normal range of motion. Neck supple.   Cardiovascular: Normal rate, regular rhythm and intact distal pulses.   Pulmonary/Chest: Effort normal and breath sounds normal. No respiratory  distress. He has no wheezes. He has no rales.   Abdominal: Soft. Bowel sounds are normal. He exhibits no distension. There is no tenderness. There is no rebound.   Musculoskeletal: Normal range of motion.         General: No edema.   Neurological: He is alert and oriented to person, place, and time. No cranial nerve deficit. Gait normal. Coordination normal.   Skin: Skin is warm and dry. No erythema.       Assessment/Plan     * NSTEMI (non-ST elevated myocardial infarction) ([facility])  Assessment & Plan  Patient presented with worsening chest pain and shortness of breath.  He has history of coronary artery disease with stent placed 7 years ago and congestive heart failure.  He has risk factors such as diabetes mellitus, hypertension, dyslipidemia, family history of coronary artery disease, 45 years smoking history.  On admission troponin were 829, trending down currently  EKG showing ST depression and T wave changes in emperatriz lateral leads.    Plan:  Continue to monitor on  telemetry  Continue pulse ox  Continue nitroglycerin prn for chest pain  Follow up  echo ordered - pending   Metoprolol dose increased to 25 mg BID    Continue dual antiplatelet  therapy  Nuclear stress test done today 11/14/19 ( cath was cancelled by cardio) - Shows nonreversible, decreased uptake of moderate severity in the apical anterior, mid anterior and basal anteroseptal along with nonreversible decreased uptake of mild severity in the basal inferior segment during post stress images.  Currently heparin gtt was on hold  By cardiology .   Continuing with Metoprolol ,aspirin ,prasugrel   Increased the dose of statin to 80 mg   Cardiology input is appreciated for further management of the NSTEMI        Chronic kidney disease (CKD), stage V ([facility])- (present on admission)  Assessment & Plan  Patient has history of chronic kidney disease stage V   Presenting to the emergency department due to worsening chest pain and shortness of breath  He is  following up with , nephrologist as an outpatient   On admission his creatinine is 7.23, bun 125  Patient started on HD on 11/12/19  HCO3 was 7 on admission which has improved to 24 after the NaHCO3 infusion.    Plan:  Patient on HD (11/14/19 -  HD # 3)  Avoid nephrotoxins  Continue renal diet  Continue  phoslo  With meals   Continue to monitor the labs  Patient to get outpatient HD on discharge         Hyperthyroidism- (present on admission)  Assessment & Plan  No temperature intolerance. No change in weight, hair/skin quality, BMs.   No tremors, weakness.  No peripheral swelling.  No mood changes.  FH: son  US thyroid showed benign thyroid nodules-(benign bx 8/23/18)  Patient supposed to take methimazole 5 mg daily as per the PCP notes but patient is not compliant .  11/13/19 TSH low = <0.005 and high free T4 of 4.16     Plan:  Continue with methimazole 5 mg daily  Counseling for medication compliance   Outpatient follow up with PCP or endocrinology after diacharge    Diabetes mellitus type 2, controlled, with complications ([facility]CC)- (present on admission)  Assessment & Plan  Patient has history of type 2 diabetes mellitus  At home he is on insulin Lantus 36 units every evening, not in any short-acting insulin.  On admission serum glucose 142  Hemoglobin A1c 5.3 in September 2019    Continue  insulin Lantus 14 units   Continue the  sliding scale Insulin   Hypoglycemia protocol  Continue to monitor    Anemia of chronic disease- (present on admission)  Assessment & Plan  Stable   Anemia--secondary to CKD  Normocytic anemia   Hb dropped to 6.8 on 11/12/19 and received 1 unit of blood transfused   Currently the Hb is 8.5   Plan:  CTM  Blood transfusion if the Hb < 7   Nephrology - epoetin with HD      Gastroesophageal reflux disease- (present on admission)  Assessment & Plan  Patient has history of GERD.  He is on pantoprazole at home        Essential hypertension- (present on admission)  Assessment &  Plan  History of hypertension  On admission blood pressure 114/60  Holding home medication , continue to monitor, restart if BP is elevated

## 2019-11-15 NOTE — THERAPY
"Physical Therapy Evaluation completed.   Bed Mobility:  Supine to Sit: (NT, sitting EOB upon entry)  Transfers: Sit to Stand: Minimal Assist  Gait: Level Of Assist: Minimal Assist with No Equipment Needed       Plan of Care: Will benefit from Physical Therapy 3 times per week  Discharge Recommendations: Equipment: Will Continue to Assess for Equipment Needs. Post-acute therapy: see below.    See \"Rehab Therapy-Acute\" Patient Summary Report for complete documentation.    Patient is a pleasant 74 YO male that was admitted 11/11 following complaint of chest pain x3 weeks and weakness. PMHx significant for CKD stage 5, HTN, DM, CAD. He presented to PT with impaired balance and coordination, patient reported impaired cognition and affect, and decreased activity tolerance which are limiting his ability to safely perform mobility at [facility]. He ambulated approximately 75ft x2 with no AD and min A; he demonstrated decreased NESTOR and decreased reuben. At this time recommend post acute placement though anticipate mobility will improve with medical interventions planned; patient pending decision regarding cardiac POC. Will follow while in house.  "

## 2019-11-15 NOTE — THERAPY
"Speech Language Therapy dysphagia treatment completed.   Functional Status:  Called by RN to see patient for swallowing reassessment as he is wanting to eat and drink.  He is s/p stress test this morning and dialysis this afternoon.  Patient awake, alert and oriented to person, place, month, year and episode with minimal confusion to recent events.  Vocal quality with slight decrease in intensity, but improved from yesterday. He did have some difficulty with word finding during the evaluation, so will monitor this along with cognitive status closely and proceed with request for evaluation if warranted. Patient able to sit at EOB for session.  Presentation of PO consisted of ice, nectars, thin liquids, purees and soft solids. Laryngeal elevation was minimally weak, and timing of swallow was slightly delayed (2-4 seconds).  Patient able to consume nectars and purees without any overt S/Sx of aspiration noted.  On thin liquids, he had inconsistent wet voice following swallow ~25% of the time and delayed coughing X3 which can be concerning for possible penetration/aspiration.  On mixed consistencies, he had coughing following swallow in 3/6 trials, but with single pieces of fruit, he was able to swallow without any overt difficulties.  At this time, would recommend dysphagia II diet with nectar thick liquids (cardiac/renal). Patient will be NPO at midnight pending possible cath lab tomorrow afternoon per report.     Recommendations: dysphagia II diet with NTL (cardiac/renal)  1) 90* angle for all PO intake  2) Small bites and sips  3) NO STRAWS  4) STOP PO with any difficulty    Plan of Care: Will benefit from Speech Therapy 3 times per week    Post-Acute Therapy: Recommend inpatient transitional care services vs home health transitional care services for continued speech therapy services    See \"Rehab Therapy-Acute\" Patient Summary Report for complete documentation.     "

## 2019-11-15 NOTE — CARE PLAN
Problem: Nutritional:  Goal: Achieve adequate nutritional intake  Description  Patient will consume 50% of meals   11/15/2019 1541 by Marlee Conley R.D.  Outcome: MET     ~50% PO intake per pt, agreeable to HS snack. RD to follow weekly.

## 2019-11-15 NOTE — PROGRESS NOTES
[facility] Nephrology Daily Progress Note    Author: BABAR Sosa   Collaborating Physician: Dr Agee   Date of Service  11/15/2019    Chief Complaint  Follow up New ESRD    Interval Problem Update  This is a 73-year-old  male with past medical history significant for chronic kidney disease stage V, not yet   initiated on hemodialysis, hypertension, diabetes mellitus type 2, coronary artery disease status post stent in the past, anemia secondary to chronic kidney disease, CKD bone mineral disorder/renal osteodystrophy, hyperparathyroidism treated with temozolomide, who was admitted with complaints of chest discomfort and shortness of breath x4 days and was found to have a non-ST elevation MI and now noted to have worsening renal function as well as severe metabolic acidosis.  Patient reports that for the past 4 days, he has not been feeling well.  He has been having this vague chest discomfort as well as some shortness of breath.  He came to the ER for further evaluation last night and was found to have an elevated troponin level consistent with a non-ST elevation MI.  He has been seen by cardiology who recommends medical management at this time.  Patient does have a history of chronic kidney disease stage V, for which he follows with [facility] Nephrology.  His GFR has been around 9-10 mL per minute, but he has been asymptomatic as an outpatient, and so, he has not yet needed to initiate dialysis.  He has a history of a failed AV fistula and he was referred for vascular surgery to have a new AV fistula created and he is in the process of this.  The patient reports that at this time, he does not have any shortness of breath and his chest pain has resolved.  His labs show an elevated BUN of 129, creatinine is 6.53 and a GFR of 8 mL per minute and he has a significant metabolic acidosis with a serum bicarbonate level of 7 and this was a level that I was at last night on admission as well.   He only recently was started on a bicarbonate drip with one-half normal saline with 75 mEq of sodium bicarbonate at 75 mL an hour by the primary service.  His troponin level has peaked at 816 and is now trending down.    DAILY NEPHROLOGY SUMMARY  11/13/19--No events, tunneled HD catheter placed yest by IR and HD initiated, BP low during HD and no fluid removed, pt feels nauseous and weak today, BP stable, acidosis improved, serum K low, BUN improved with HD, getting PRBC transfusion today  11/14/19--Hd treatment #3 today, he reports occasional nausea, and sob.  He reports tolerating HD well and believes it is helping with SOB.    11/15/19--No HD today, Plan for cardiac cath tomorrow. Pt sitting up at bedside, he is feeling well today.  Tolerated HD well yesterday.  Denies pain, n/v, sob.  He is NPO for cardiac cath.      Review of Systems  Review of Systems   Constitutional: Positive for malaise/fatigue. Negative for chills and fever.   HENT: Negative.    Eyes: Negative.    Respiratory: Negative.    Cardiovascular: Negative.    Gastrointestinal: Negative for abdominal pain, diarrhea and nausea.   Genitourinary: Negative.    Musculoskeletal: Negative.    Skin: Positive for itching. Negative for rash.   Neurological: Positive for weakness. Negative for dizziness, focal weakness and headaches.   Endo/Heme/Allergies: Negative.    Psychiatric/Behavioral: Negative.         Physical Exam  Temp:  [36.7 °C (98 °F)-37.6 °C (99.6 °F)] 36.7 °C (98 °F)  Pulse:  [] 74  Resp:  [16-18] 16  BP: (112-133)/(56-69) 117/69  SpO2:  [94 %-99 %] 97 %    Physical Exam  Vitals signs and nursing note reviewed.   Constitutional:       General: He is not in acute distress.     Appearance: Normal appearance. He is ill-appearing.   HENT:      Head: Normocephalic and atraumatic.      Right Ear: External ear normal.      Left Ear: External ear normal.      Nose: Nose normal.      Mouth/Throat:      Mouth: Mucous membranes are moist.       Pharynx: Oropharynx is clear. No oropharyngeal exudate.   Eyes:      General: No scleral icterus.     Extraocular Movements: Extraocular movements intact.      Conjunctiva/sclera: Conjunctivae normal.   Neck:      Musculoskeletal: Normal range of motion and neck supple. No muscular tenderness.   Cardiovascular:      Rate and Rhythm: Normal rate and regular rhythm.      Heart sounds: Normal heart sounds. No murmur.   Pulmonary:      Effort: Pulmonary effort is normal. No respiratory distress.      Breath sounds: Normal breath sounds. No wheezing or rales.   Abdominal:      General: Abdomen is flat. Bowel sounds are normal. There is no distension.      Palpations: Abdomen is soft.      Tenderness: There is no tenderness.   Musculoskeletal: Normal range of motion.         General: No swelling.   Lymphadenopathy:      Cervical: No cervical adenopathy.   Skin:     General: Skin is warm and dry.      Findings: No rash.   Neurological:      General: No focal deficit present.      Mental Status: He is alert and oriented to person, place, and time.      Motor: No weakness.   Psychiatric:         Mood and Affect: Mood normal.         Behavior: Behavior normal.         Fluids    Intake/Output Summary (Last 24 hours) at 11/15/2019 1300  Last data filed at 11/14/2019 1600  Gross per 24 hour   Intake 800 ml   Output 1500 ml   Net -700 ml       Laboratory  Recent Labs     11/13/19  0244 11/13/19  1407 11/14/19  0207 11/15/19  0236   WBC 6.3  --  6.5 8.0   RBC 2.21*  --  2.84* 3.00*   HEMOGLOBIN 6.8* 8.6* 8.5* 9.2*   HEMATOCRIT 19.5*  --  25.2* 27.0*   MCV 88.2  --  88.7 90.0   MCH 30.8  --  29.9 30.7   MCHC 34.9  --  33.7 34.1   RDW 43.7  --  45.2 45.6   PLATELETCT 207  --  170 173   MPV 9.2  --  9.5 9.5     Recent Labs     11/13/19  1407 11/14/19  0207 11/15/19  0236   SODIUM 144 141 141   POTASSIUM 3.2* 3.6 4.1   CHLORIDE 109 106 105   CO2 24 24 26   GLUCOSE 134* 118* 102*   BUN 79* 45* 25*   CREATININE 4.74* 3.43* 2.75*    CALCIUM 7.3* 7.4* 8.2*     Recent Labs     11/12/19  1749 11/13/19  1944   APTT 86.2* 94.3*     No results for input(s): NTPROBNP in the last 72 hours.        Assessment:  1. PAT on CKD Stage V--progression of his CKD, now new ESRD  --I had a long talk with patient and he has been reluctant to start HD in the past but he is now agreeable  --R Chest PC placed 11/12 and HD initiated  --3 consecutive HD treatments completed 11/14 will transition to TTHS schedule until we know outpt schedule.    2. NSTEMI--medical management for now per cardiology  3. Metabolic Acidosis--improved with bicarb drip and dialysis  --bicarb drip discontinued   4. HTN--BP low during HD yest, now stable  5. DM II--per primary svc  6. Renal Osteodystrophy/CKD Bone Mineral Disorder--phos elevated  --calcium acetate started   --  7. Hyperthyroidism--treated with methimazole as outpt and pt reports that he is still on this medication  8. Anemia--secondary to CKD  --No need for IV Iron  --Getting PRBC transfusion 11/13  9. Secondary Hyperparathyroidism  10. SOB--resolved  11. Nausea--anti-emetics per primary svc  12. Pruritis--likely related to hyperphosphatemia  13. Hypokalemia--additional supplementation ordered and will adjust K bath on dialysis  14. Moderate Protein-Calorie Malnutrition     Plan:  --No HD today (Friday)   --HD q TTHS and prn schedule, may need to adjust for outpt schedule   --Pt will need outpt HD arrangements  --Quantiferon gold ordered to facilitate outpt HD placement  --Cardiology following for NSTEMI, Cath planned for 11/16/19   --Epogen with HD  --Transfuse PRN for hgb less than 7  --Continue phoslo with meals   --Check Vit D   -- add calcitriol   --Pt is on methimazole as outpt for treatment of hyperthyroidism by his PCP, primary svc to manage while pt is admitted

## 2019-11-15 NOTE — PROGRESS NOTES
Cardiology Follow Up Progress Note    Date of Service  11/15/2019    Attending Physician  KEREN Canada M.D.    Chief Complaint   Chest pain    Cardiology consult   Elevated trop    HPI  Adalid Sepulveda is a 73 y.o. male admitted 11/11/2019 with chest pain. History of previous stenting at [facility] with Dr. Mann.    Past medical history of ESRD previous placement of AV fistula and failed, hypertension and diabetes.    Interim Events  11/15/2019: Patient resting in bed with no chest pain overnight.  Discussion with patient whether or not he is interested in cardiac catheterization.  After the patient further deliberated on whether to have cardiac catheterization he is willing.  Will schedule for possible cath tomorrow. SR 70-80s.    11/14/19: Patient is resting in bed, sob has improved. Received another round of dialysis yesterday. hbg is stable overnight at 8, after transfusion. Reverted back into NSR after 4 hrs of afib.     11/13/19: chest has resolved. He received one round of hemodialysis yesterday and plan for today per nurse. Denies any SOB or dizziness. Vital sign stable. Converted to afib overnight and he has history of afib per patient.     11/12/19: currently his chest pain has resolved. Denies any SOB, dizziness or palpitations. SR . Patient is hesitant regarding dialysis and quality of life. Continue heparin gtt. He stated his shortness of breath and chest pain started about a month ago and progressing.     Trop T 816  Creatinine 2.75  GFR 23  Na 141, K 4.1      Review of Systems  Review of Systems   Constitutional: Negative for chills and fever.   Respiratory: Negative for cough, chest tightness, shortness of breath and wheezing.    Cardiovascular: Negative for chest pain, palpitations and leg swelling.   Gastrointestinal: Negative for nausea and vomiting.   Neurological: Negative for dizziness and headaches.   All other systems reviewed and are  negative.      Vital signs in last 24 hours  Temp:  [36.7 °C (98 °F)-37.6 °C (99.6 °F)] 36.7 °C (98 °F)  Pulse:  [] 74  Resp:  [16-18] 16  BP: (112-138)/(56-69) 117/69  SpO2:  [94 %-99 %] 97 %    Physical Exam  Physical Exam  Vitals signs and nursing note reviewed.   Constitutional:       Appearance: Normal appearance.   HENT:      Head: Normocephalic and atraumatic.      Mouth/Throat:      Mouth: Mucous membranes are moist.   Eyes:      Extraocular Movements: Extraocular movements intact.   Neck:      Musculoskeletal: Normal range of motion and neck supple.      Comments: No JVD  Cardiovascular:      Rate and Rhythm: Normal rate and regular rhythm.      Pulses: Normal pulses.      Heart sounds: Normal heart sounds. No murmur.   Pulmonary:      Effort: Pulmonary effort is normal.      Breath sounds: Normal breath sounds.   Abdominal:      Palpations: Abdomen is soft.   Skin:     General: Skin is warm and dry.   Neurological:      General: No focal deficit present.      Mental Status: He is alert and oriented to person, place, and time.   Psychiatric:         Mood and Affect: Mood normal.         Behavior: Behavior normal.         Lab Review  Lab Results   Component Value Date/Time    WBC 8.0 11/15/2019 02:36 AM    RBC 3.00 (L) 11/15/2019 02:36 AM    HEMOGLOBIN 9.2 (L) 11/15/2019 02:36 AM    HEMATOCRIT 27.0 (L) 11/15/2019 02:36 AM    MCV 90.0 11/15/2019 02:36 AM    MCH 30.7 11/15/2019 02:36 AM    MCHC 34.1 11/15/2019 02:36 AM    MPV 9.5 11/15/2019 02:36 AM      Lab Results   Component Value Date/Time    SODIUM 141 11/15/2019 02:36 AM    POTASSIUM 4.1 11/15/2019 02:36 AM    CHLORIDE 105 11/15/2019 02:36 AM    CO2 26 11/15/2019 02:36 AM    GLUCOSE 102 (H) 11/15/2019 02:36 AM    BUN 25 (H) 11/15/2019 02:36 AM    CREATININE 2.75 (H) 11/15/2019 02:36 AM      Lab Results   Component Value Date/Time    ASTSGOT 22 11/15/2019 02:36 AM    ALTSGPT 15 11/15/2019 02:36 AM     Lab Results   Component Value Date/Time     CHOLSTRLTOT 133 04/08/2019 11:56 AM    LDL 61 04/08/2019 11:56 AM    HDL 26 (A) 04/08/2019 11:56 AM    TRIGLYCERIDE 232 (H) 04/08/2019 11:56 AM    TROPONINT 749 (H) 11/12/2019 11:53 AM    TROPONINT 742 (H) 11/12/2019 11:53 AM       No results for input(s): NTPROBNP in the last 72 hours.    Cardiac Imaging and Procedures Review  EKG:    SINUS RHYTHM   RBBB AND LAFB   ABNORMAL T, CONSIDER ISCHEMIA, LATERAL LEADS   Compared to ECG 11/12/2019 00:48:53   No significant changes     Nuc Med Stress 11/14/2019   Myocardial Perfusion Report   NUCLEAR IMAGING INTERPRETATION   Infarct in the mid anterior and apical portions of LV.   No LV ischemia.    Echocardiogram:    11/12/19  Thin and hypokinetic mid anteroseptal  wall.  Low normal left ventricular systolic function.  Left ventricular ejection fraction is visually estimated to be 50-55%.  Normal left ventricular chamber size.  Structurally normal aortic and mitral valve without significant   stenosis or regurgitation.  Normal left atrial size.  Unable to estimate pulmonary artery pressure due to an inadequate   tricuspid regurgitant jet.  Normal inferior vena cava size and inspiratory collapse.  Normal right ventricular size and systolic function.  Normal pericardium without effusion.       Assessment/Plan  1. NSTEMI:   - trop T peaked at 829, asymptomatic and trop treading down   - [facility] from [facility] pending   - MPI shows infarct in the mid anterior and apical portions of LV  - ECHO showed ef 50-55%. Thin and hypokinetic mid anteroseptal  wall.  - continue asa and atorvastatin  -Increase metoprolol 100 mg twice daily  -Patient now willing to have cardiac catheterization, scheduled for tomorrow if possible  -N.p.o. at midnight    2. ESRD:  - has tunneled cath in place now, plan for HD    3. Atrial fibrillation:   -Continue NSR  -Increase metoprolol 100 mg twice daily  -Continue clopidogrel 75 mg daily stop Prasugrel    4. Hypokalemia:  - Potassium 4.1, improving  - defer to  nephrology with ESRD    5. Anemia:  -8.0 today  - denies any blood in stool or urine   - most likely due to ERSD       Thank you for allowing me to participate in the care of this patient.  Cardiology will sign off    Please contact me with any questions.    BABAR Farley  [facility]  [phone]    No future appointments.

## 2019-11-15 NOTE — PROGRESS NOTES
Report received from Kylah RN, pt care assumed, tele box on. Pt A&Ox4, no signs of distress noted at this time. Dr. Auguste at bedside, POC discussed with Patient and questions answered. Pt denies pain. Pt denies any additional needs at this time. Bed in lowest position, call light within reach, will continue to monitor. Per discussion with MD, no cath lab today. Will resume diet orders.

## 2019-11-16 ENCOUNTER — APPOINTMENT (OUTPATIENT)
Dept: CARDIOLOGY | Facility: MEDICAL CENTER | Age: 74
DRG: 280 | End: 2019-11-16
Attending: NURSE PRACTITIONER
Payer: MEDICARE

## 2019-11-16 PROBLEM — I48.0 PAROXYSMAL A-FIB (HCC): Status: ACTIVE | Noted: 2019-11-16

## 2019-11-16 LAB
25(OH)D3 SERPL-MCNC: 25 NG/ML (ref 30–100)
ANION GAP SERPL CALC-SCNC: 8 MMOL/L (ref 0–11.9)
BACTERIA BLD CULT: NORMAL
BACTERIA BLD CULT: NORMAL
BASOPHILS # BLD AUTO: 0.4 % (ref 0–1.8)
BASOPHILS # BLD: 0.03 K/UL (ref 0–0.12)
BUN SERPL-MCNC: 38 MG/DL (ref 8–22)
CALCIUM SERPL-MCNC: 8.1 MG/DL (ref 8.5–10.5)
CHLORIDE SERPL-SCNC: 107 MMOL/L (ref 96–112)
CO2 SERPL-SCNC: 22 MMOL/L (ref 20–33)
CREAT SERPL-MCNC: 3.83 MG/DL (ref 0.5–1.4)
EKG IMPRESSION: NORMAL
EOSINOPHIL # BLD AUTO: 0.02 K/UL (ref 0–0.51)
EOSINOPHIL NFR BLD: 0.3 % (ref 0–6.9)
ERYTHROCYTE [DISTWIDTH] IN BLOOD BY AUTOMATED COUNT: 46.4 FL (ref 35.9–50)
GLUCOSE BLD-MCNC: 142 MG/DL (ref 65–99)
GLUCOSE BLD-MCNC: 64 MG/DL (ref 65–99)
GLUCOSE BLD-MCNC: 74 MG/DL (ref 65–99)
GLUCOSE BLD-MCNC: 94 MG/DL (ref 65–99)
GLUCOSE BLD-MCNC: 99 MG/DL (ref 65–99)
GLUCOSE SERPL-MCNC: 121 MG/DL (ref 65–99)
HCT VFR BLD AUTO: 26.5 % (ref 42–52)
HGB BLD-MCNC: 8.8 G/DL (ref 14–18)
IMM GRANULOCYTES # BLD AUTO: 0.11 K/UL (ref 0–0.11)
IMM GRANULOCYTES NFR BLD AUTO: 1.4 % (ref 0–0.9)
LYMPHOCYTES # BLD AUTO: 1.13 K/UL (ref 1–4.8)
LYMPHOCYTES NFR BLD: 14.9 % (ref 22–41)
MCH RBC QN AUTO: 30.6 PG (ref 27–33)
MCHC RBC AUTO-ENTMCNC: 33.2 G/DL (ref 33.7–35.3)
MCV RBC AUTO: 92 FL (ref 81.4–97.8)
MONOCYTES # BLD AUTO: 1.18 K/UL (ref 0–0.85)
MONOCYTES NFR BLD AUTO: 15.5 % (ref 0–13.4)
NEUTROPHILS # BLD AUTO: 5.13 K/UL (ref 1.82–7.42)
NEUTROPHILS NFR BLD: 67.5 % (ref 44–72)
NRBC # BLD AUTO: 0 K/UL
NRBC BLD-RTO: 0 /100 WBC
PLATELET # BLD AUTO: 171 K/UL (ref 164–446)
PMV BLD AUTO: 9.7 FL (ref 9–12.9)
POTASSIUM SERPL-SCNC: 4.2 MMOL/L (ref 3.6–5.5)
RBC # BLD AUTO: 2.88 M/UL (ref 4.7–6.1)
SIGNIFICANT IND 70042: NORMAL
SIGNIFICANT IND 70042: NORMAL
SITE SITE: NORMAL
SITE SITE: NORMAL
SODIUM SERPL-SCNC: 137 MMOL/L (ref 135–145)
SOURCE SOURCE: NORMAL
SOURCE SOURCE: NORMAL
WBC # BLD AUTO: 7.6 K/UL (ref 4.8–10.8)

## 2019-11-16 PROCEDURE — 93010 ELECTROCARDIOGRAM REPORT: CPT | Performed by: INTERNAL MEDICINE

## 2019-11-16 PROCEDURE — 700102 HCHG RX REV CODE 250 W/ 637 OVERRIDE(OP): Performed by: NURSE PRACTITIONER

## 2019-11-16 PROCEDURE — 700102 HCHG RX REV CODE 250 W/ 637 OVERRIDE(OP): Performed by: STUDENT IN AN ORGANIZED HEALTH CARE EDUCATION/TRAINING PROGRAM

## 2019-11-16 PROCEDURE — A9270 NON-COVERED ITEM OR SERVICE: HCPCS | Performed by: INTERNAL MEDICINE

## 2019-11-16 PROCEDURE — 36415 COLL VENOUS BLD VENIPUNCTURE: CPT

## 2019-11-16 PROCEDURE — 99153 MOD SED SAME PHYS/QHP EA: CPT

## 2019-11-16 PROCEDURE — A9270 NON-COVERED ITEM OR SERVICE: HCPCS | Performed by: NURSE PRACTITIONER

## 2019-11-16 PROCEDURE — 93458 L HRT ARTERY/VENTRICLE ANGIO: CPT | Mod: 26 | Performed by: INTERNAL MEDICINE

## 2019-11-16 PROCEDURE — 85025 COMPLETE CBC W/AUTO DIFF WBC: CPT

## 2019-11-16 PROCEDURE — 99232 SBSQ HOSP IP/OBS MODERATE 35: CPT | Mod: 25 | Performed by: INTERNAL MEDICINE

## 2019-11-16 PROCEDURE — 80048 BASIC METABOLIC PNL TOTAL CA: CPT

## 2019-11-16 PROCEDURE — 770020 HCHG ROOM/CARE - TELE (206)

## 2019-11-16 PROCEDURE — 700111 HCHG RX REV CODE 636 W/ 250 OVERRIDE (IP)

## 2019-11-16 PROCEDURE — A9270 NON-COVERED ITEM OR SERVICE: HCPCS | Performed by: STUDENT IN AN ORGANIZED HEALTH CARE EDUCATION/TRAINING PROGRAM

## 2019-11-16 PROCEDURE — 700102 HCHG RX REV CODE 250 W/ 637 OVERRIDE(OP): Performed by: INTERNAL MEDICINE

## 2019-11-16 PROCEDURE — 4A023N7 MEASUREMENT OF CARDIAC SAMPLING AND PRESSURE, LEFT HEART, PERCUTANEOUS APPROACH: ICD-10-PCS | Performed by: INTERNAL MEDICINE

## 2019-11-16 PROCEDURE — 99152 MOD SED SAME PHYS/QHP 5/>YRS: CPT | Performed by: INTERNAL MEDICINE

## 2019-11-16 PROCEDURE — 93005 ELECTROCARDIOGRAM TRACING: CPT | Performed by: INTERNAL MEDICINE

## 2019-11-16 PROCEDURE — 700105 HCHG RX REV CODE 258: Performed by: NURSE PRACTITIONER

## 2019-11-16 PROCEDURE — 99232 SBSQ HOSP IP/OBS MODERATE 35: CPT | Mod: GC | Performed by: INTERNAL MEDICINE

## 2019-11-16 PROCEDURE — 5A1D70Z PERFORMANCE OF URINARY FILTRATION, INTERMITTENT, LESS THAN 6 HOURS PER DAY: ICD-10-PCS | Performed by: INTERNAL MEDICINE

## 2019-11-16 PROCEDURE — 700101 HCHG RX REV CODE 250

## 2019-11-16 PROCEDURE — 82962 GLUCOSE BLOOD TEST: CPT | Mod: 91

## 2019-11-16 PROCEDURE — B2111ZZ FLUOROSCOPY OF MULTIPLE CORONARY ARTERIES USING LOW OSMOLAR CONTRAST: ICD-10-PCS | Performed by: INTERNAL MEDICINE

## 2019-11-16 PROCEDURE — 700117 HCHG RX CONTRAST REV CODE 255: Performed by: INTERNAL MEDICINE

## 2019-11-16 PROCEDURE — 90935 HEMODIALYSIS ONE EVALUATION: CPT

## 2019-11-16 PROCEDURE — 82306 VITAMIN D 25 HYDROXY: CPT

## 2019-11-16 RX ORDER — LIDOCAINE HYDROCHLORIDE 20 MG/ML
INJECTION, SOLUTION INFILTRATION; PERINEURAL
Status: COMPLETED
Start: 2019-11-16 | End: 2019-11-16

## 2019-11-16 RX ORDER — LABETALOL HYDROCHLORIDE 5 MG/ML
10 INJECTION, SOLUTION INTRAVENOUS
Status: DISCONTINUED | OUTPATIENT
Start: 2019-11-16 | End: 2019-11-18 | Stop reason: HOSPADM

## 2019-11-16 RX ORDER — MIDAZOLAM HYDROCHLORIDE 1 MG/ML
INJECTION INTRAMUSCULAR; INTRAVENOUS
Status: COMPLETED
Start: 2019-11-16 | End: 2019-11-16

## 2019-11-16 RX ORDER — HEPARIN SODIUM,PORCINE 1000/ML
VIAL (ML) INJECTION
Status: COMPLETED
Start: 2019-11-16 | End: 2019-11-16

## 2019-11-16 RX ORDER — HEPARIN SODIUM 200 [USP'U]/100ML
INJECTION, SOLUTION INTRAVENOUS
Status: COMPLETED
Start: 2019-11-16 | End: 2019-11-16

## 2019-11-16 RX ORDER — HEPARIN SODIUM 1000 [USP'U]/ML
INJECTION, SOLUTION INTRAVENOUS; SUBCUTANEOUS
Status: COMPLETED
Start: 2019-11-16 | End: 2019-11-16

## 2019-11-16 RX ORDER — VERAPAMIL HYDROCHLORIDE 2.5 MG/ML
INJECTION, SOLUTION INTRAVENOUS
Status: COMPLETED
Start: 2019-11-16 | End: 2019-11-16

## 2019-11-16 RX ADMIN — METHIMAZOLE 5 MG: 5 TABLET ORAL at 06:19

## 2019-11-16 RX ADMIN — POTASSIUM CHLORIDE 20 MEQ: 20 TABLET, EXTENDED RELEASE ORAL at 06:23

## 2019-11-16 RX ADMIN — CLOPIDOGREL BISULFATE 75 MG: 75 TABLET ORAL at 06:22

## 2019-11-16 RX ADMIN — Medication 667 MG: at 11:13

## 2019-11-16 RX ADMIN — Medication 667 MG: at 18:26

## 2019-11-16 RX ADMIN — MIDAZOLAM HYDROCHLORIDE 1 MG: 1 INJECTION, SOLUTION INTRAMUSCULAR; INTRAVENOUS at 09:22

## 2019-11-16 RX ADMIN — SODIUM CHLORIDE: 9 INJECTION, SOLUTION INTRAVENOUS at 06:13

## 2019-11-16 RX ADMIN — ASPIRIN 81 MG: 81 TABLET, COATED ORAL at 06:19

## 2019-11-16 RX ADMIN — METOPROLOL TARTRATE 100 MG: 50 TABLET, FILM COATED ORAL at 06:21

## 2019-11-16 RX ADMIN — LIDOCAINE HYDROCHLORIDE: 20 INJECTION, SOLUTION INFILTRATION; PERINEURAL at 09:22

## 2019-11-16 RX ADMIN — FENTANYL CITRATE 50 MCG: 50 INJECTION, SOLUTION INTRAMUSCULAR; INTRAVENOUS at 09:22

## 2019-11-16 RX ADMIN — METOPROLOL TARTRATE 100 MG: 50 TABLET, FILM COATED ORAL at 18:26

## 2019-11-16 RX ADMIN — HEPARIN SODIUM: 1000 INJECTION, SOLUTION INTRAVENOUS; SUBCUTANEOUS at 09:23

## 2019-11-16 RX ADMIN — Medication 667 MG: at 06:22

## 2019-11-16 RX ADMIN — HEPARIN SODIUM 3700 UNITS: 1000 INJECTION, SOLUTION INTRAVENOUS; SUBCUTANEOUS at 17:11

## 2019-11-16 RX ADMIN — NITROGLYCERIN 10 ML: 20 INJECTION INTRAVENOUS at 09:22

## 2019-11-16 RX ADMIN — ATORVASTATIN CALCIUM 80 MG: 80 TABLET, FILM COATED ORAL at 18:26

## 2019-11-16 RX ADMIN — IOHEXOL 40 ML: 350 INJECTION, SOLUTION INTRAVENOUS at 09:32

## 2019-11-16 RX ADMIN — HEPARIN SODIUM 2000 UNITS: 200 INJECTION, SOLUTION INTRAVENOUS at 09:22

## 2019-11-16 ASSESSMENT — ENCOUNTER SYMPTOMS
HEADACHES: 0
WEAKNESS: 1
DIARRHEA: 0
PSYCHIATRIC NEGATIVE: 1
RESPIRATORY NEGATIVE: 1
FEVER: 0
FOCAL WEAKNESS: 0
EYES NEGATIVE: 1
NAUSEA: 0
MUSCULOSKELETAL NEGATIVE: 1
DIZZINESS: 0
CARDIOVASCULAR NEGATIVE: 1
ABDOMINAL PAIN: 0
CHILLS: 0

## 2019-11-16 NOTE — PROGRESS NOTES
Internal Medicine Interval Note  Note Author: Kaia Weber M.D.     Name Luis Sepulveda     1945   Age/Sex 73 y.o. male   MRN [id number]   Code Status DNAR/ Intubation ok      After 5PM or if no immediate response to page, please call for cross-coverage  Attending/Team:  /shirley  See Patient List for primary contact information  Call [phone] to page    1st Call - Day Intern (R1):    2nd Call - Day Sr. Resident (R2/R3):            Reason for interval visit  (Principal Problem)   Chest pain with NSTEMI     Interval Problem Daily Status Update  (24 hours, problem oriented, brief subjective history, new lab/imaging data pertinent to that problem)     Mr Sepulveda is a 72yo male who came with the complaint of chest pain ,troponin levels( 829) and EKG findings of ST depression in anterolateral leads is diagnosed with NSTEMI     - No acute events   - No acute complains, waiting for cath when in in the morning. No chest pain / SOB  - Cardiac cath done today - patent coronary stents proximal to distal, left anterior descending artery. No stents were done. Cardiology (Dr. Osborn) recommended F/U with EKG and maximal medical therapy.  - TB quant neg, has outpatient HD chair set with [facility]   Constitutional: Negative for chills, fever and weight loss.   HENT: Negative for ear pain, hearing loss and tinnitus.    Eyes: Negative for blurred vision, double vision and photophobia.   Respiratory: Positive for productive cough Negative for hemoptysis and wheezing.    Cardiovascular: Denies chest pain . Negative for palpitations, orthopnea, claudication and leg swelling.   Gastrointestinal: Negative for abdominal pain, constipation, diarrhea, heartburn   Genitourinary: Negative for dysuria and urgency.   Musculoskeletal: Negative for myalgias.   Skin: Negative for rash.   Neurological: positive for mild  dizziness and nausea. Negative for tingling, sensory  change, speech change and headaches.       Disposition/Barriers to discharge  NSTEMI with chest pain     Consultants/Specialty  Cardiology Dr.Benjamin Kalyani MOTA  Nephrology Dr.Soni YUKI Agee  PCP: Patito Edgar M.D.      Quality Measures  Quality-Core Measures   Reviewed items::  EKG reviewed, Labs reviewed, Medications reviewed and Radiology images reviewed  Tomas catheter::  No Tomas  DVT prophylaxis - mechanical:  SCDs          Physical Exam       Vitals:    11/16/19 1055 11/16/19 1125 11/16/19 1155 11/16/19 1225   BP: 139/58 137/66 135/63 134/60   Pulse: 71 77 76 78   Resp: 16 16 16 16   Temp: 36.7 °C (98 °F) 36.8 °C (98.2 °F) 37 °C (98.6 °F) 37.1 °C (98.8 °F)   TempSrc: Temporal Temporal Temporal Temporal   SpO2: 98% 97% 99% 95%   Weight:       Height:         Body mass index is 24.95 kg/m². Weight: 68 kg (149 lb 14.6 oz)  Oxygen Therapy:  Pulse Oximetry: 95 %, O2 (LPM): 0, O2 Delivery: None (Room Air)    Physical Exam    Constitutional: He is oriented to person, place, and time and well-developed, well-nourished, and in no distress. No distress.   HENT:   Head: Normocephalic and atraumatic.   Mouth/Throat: No oropharyngeal exudate.   Eyes: Pupils are equal, round, and reactive to light. EOM are normal.   Neck: Normal range of motion. Neck supple.   Cardiovascular: Normal rate, regular rhythm and intact distal pulses.   Pulmonary/Chest: Effort normal and breath sounds normal. No respiratory distress. He has no wheezes. He has no rales.   Abdominal: Soft. Bowel sounds are normal. He exhibits no distension. There is no tenderness. There is no rebound.   Musculoskeletal: Normal range of motion.         General: No edema.   Neurological: He is alert and oriented to person, place, and time. No cranial nerve deficit. Gait normal. Coordination normal.   Skin: Skin is warm and dry. No erythema.       Assessment/Plan     * NSTEMI (non-ST elevated myocardial infarction) (HCC)  Assessment & Plan  Cath done on  11/16 - patent coronary stents proximal to distal, left anterior descending artery. No stents were done. Cardiology (Dr. Osborn) recommended F/U with EKG and maximal medical therapy.  -Continue nitroglycerin prn for chest pain  -Continue asa and atorvastatin and Plavix           End stage renal disease on dialysis (HCC)- (present on admission)  Assessment & Plan  Patient has history of chronic kidney disease stage V - ESRD  Patient started on HD on 11/12/19 with tunnel cath in place  Has outpatient HD chair set with [facility] - for TTS   TB quant neg   Renal dose all meds              Hyperthyroidism- (present on admission)  Assessment & Plan  Asymptomatic   US thyroid showed benign thyroid nodules-(benign bx 8/23/18)  Patient supposed to take methimazole 5 mg daily as per the PCP notes but patient is not compliant .  11/13/19 TSH low = <0.005 and high free T4 of 4.16   Continue with methimazole 5 mg daily      Paroxysmal A-fib (HCC)  Assessment & Plan  Rate controlled with metoprolol, not on anti coag  CHADSVasc 4 - will need anticoag.   Cardiology suggested Eliquis - need to talk to  regarding Prior auth. Also need to know about plan for AVF - if no recent plan for AVF will start eliquis     Secondary hyperparathyroidism of renal origin (HCC)  Assessment & Plan  2/2 ESRD    Plan:  Calcitriol added      Diabetes mellitus type 2, controlled, with complications ([facility])- (present on admission)  Assessment & Plan  Patient has history of type 2 diabetes mellitus  At home he is on insulin Lantus 36 units every evening, not in any short-acting insulin.  On admission serum glucose 142  Hemoglobin A1c 5.3 in September 2019    Continue  insulin Lantus 14 units   Continue the  sliding scale Insulin   Hypoglycemia protocol  Continue to monitor    Anemia of chronic disease- (present on admission)  Assessment & Plan  Stable   Anemia--secondary to CKD  Normocytic anemia   Hb dropped to 6.8 on 11/12/19 and received 1 unit of  blood transfused   Currently stable   CTM  Blood transfusion if the Hb < 7   Nephrology - epoetin with HD      Gastroesophageal reflux disease- (present on admission)  Assessment & Plan  Patient has history of GERD.  He is on pantoprazole at home        Essential hypertension- (present on admission)  Assessment & Plan  History of hypertension  On admission blood pressure 114/60  Holding home medication , continue to monitor, restart if BP is elevated

## 2019-11-16 NOTE — PROGRESS NOTES
Assumed care of patient, bedside report received from Chio MONTOYA. Updated on POC, call light within reach and fall precautions in place. Bed locked and in lowest position. Patient instructed to call for assistance before getting out of bed. All questions answered, no other needs at this time.

## 2019-11-16 NOTE — PROCEDURES
"DATE OF SERVICE:  11/16/2019    PROCEDURE:  Cardiac catheterization.  A.  Left heart catheterization.  B.  Selective coronary angiography.  D.  Right femoral artery approach.  E.  Conscious sedation supervision.    PREPROCEDURE DIAGNOSES:  1.  Non-ST elevation myocardial infarction.  2.  Previous coronary stent 7 years ago, [facility].  3.  End-stage renal disease, on hemodialysis.  4.  Diabetes mellitus.  5.  Paroxysmal atrial fibrillation, new diagnosis.    POSTPROCEDURE DIAGNOSES:  1.  Patent coronary stents proximal mid, left anterior descending   artery.  2.  Coronary artery disease involving diagonal \"jailed\" branch   ostial 90% stenosis, posterior descending artery ostial 50% stenosis.    PHYSICIAN:  Kulwant Osborn MD    REFERRING PHYSICIANS:  Dr. John Auguste and Dr. James Vivas.    COMPLICATIONS:  None.    MEDICATIONS:  1.  IV Versed.  2.  IV fentanyl.  3.  Lidocaine 2% subcutaneous.    INDICATIONS:  The patient is a 73-year-old male with a history of coronary   artery disease, previous myocardial infarction and coronary stent implantation   approximately 7 years ago at [facility], diabetes   mellitus, end-stage renal disease on hemodialysis, hypertension,   hyperlipidemia and anemia who was admitted to [facility] on   11/11/2019 with a non-ST elevation myocardial infarction.  Subsequent   noninvasive cardiac evaluation included a nuclear stress test on 11/14/2019,   which showed infarction of the mid anterior and apical portions of the left   ventricle with no ischemia and an ejection fraction of 46%.  Echocardiogram on   11/12/2019 showed left ventricular ejection fraction of 50-55% and focal  hypokinesis and thinning of the mid anterior septal wall, but no reported wall   motion abnormalities of the mid to distal anterior wall, lateral wall or apex.    EKG showed a sinus tachycardia, right bundle-branch block, left anterior   hemiblock and " lateral ST segment depression.  Followup EKG showed   atrial fibrillation.  The patient is referred for diagnostic cardiac catheterization to exclude   significant obstructive coronary artery disease, in need of repeat coronary   Intervention.      DESCRIPTION OF PROCEDURE:  After informed consent was obtained, the patient   was brought to the cardiac catheterization laboratory where he was prepped,   draped, and anesthetized in the usual manner.    Using ultrasound guidance and modified Seldinger technique, a 4-[place] x 10 cm   introducer sheath was inserted in the right femoral artery.    Next, a 4-[place] JL 4.0 left coronary catheter was inserted, but could not  selectively cannulate the left coronary artery and this was exchanged for a   4-[place] JL 4.5 left coronary catheter and left coronary angiograms were   obtained in various projections.    Next, a 4-[place] JR 4.0 right coronary catheter was inserted in the ostium of   the right coronary artery and right coronary angiograms were obtained in   various projections.    Next, the same catheter was advanced across the aortic valve into the left   ventricle.  LVEDP, LV, and pullback aortic pressures were obtained.    At the end the procedure, catheters were removed.  Hemostasis was achieved   with manual compression.  The patient tolerated the procedure well.    Conscious sedation supervision: I supervised monitor the administration of   intravenous conscious sedation from 9:08 AM until 9:36 PM.    FINDINGS:  HEMODYNAMICS:  LEFT HEART PRESSURES:  1.  LVEDP 11 mmHg.  2.  Left ventricular systolic pressure 105 mmHg.  3.  Central aortic pressure, systolic 112, diastolic 34, mean, 61 mmHg.  4.  Pulse pressure 78 mmHg.    LEFT VENTRICULOGRAPHY:  This was not performed because of chronic kidney   disease.    CORONARY ARTERIOGRAPHY:  1.  Left main artery:  The left main artery is angiographically normal,   bifurcates into the left anterior descending artery and  circumflex artery.  2.  Left anterior descending artery:  The left anterior descending artery   gives rise to a small caliber first diagonal branch, a normal complement of   septal branches and extends around the apex.  The left anterior descending   artery has a long stented segment from the proximal to mid vessel that is   patent.  The remainder of the artery is patent with mild diffuse atheromatous   disease of the distal and terminal vessel.  The jailed diagonal branch has   ostial eccentric 90% stenosis.  3.  Circumflex artery:  The circumflex gives rise to a tiny caliber first   marginal branch, 2 major long second and third marginal branches.  The   circumflex artery is patent with mild focal smooth atheromatous disease.  4.  Right coronary artery:  The right coronary artery gives rise to a   posterior descending artery and posterolateral branch.  The posterior   descending artery ostium has a focal smooth 50% stenosis.    PLAN:  1.  Maximize optimal guideline-directed medical therapy for the patient's   coronary artery disease.  2.  Maximize management of ongoing coronary risk factors to prevent future   coronary events.  3.  Followup EKG.  4.  Eliquis and Plavix combination for paroxysmal atrial fibrillation   thromboembolic prophylaxis and for coronary artery disease post MI,   and previous coronary stent.        ____________________________________     MD LAURA BROWN / DALILA    DD:  11/16/2019 10:11:38  DT:  11/16/2019 10:30:04    D#:  [id number]  Job#:  [id number]

## 2019-11-16 NOTE — PROGRESS NOTES
Internal Medicine Interval Note  Note Author: Jenny Holland M.D.     Name Luis Sepulveda     1945   Age/Sex 73 y.o. male   MRN [id number]   Code Status DNAR/ Intubation ok      After 5PM or if no immediate response to page, please call for cross-coverage  Attending/Team:  /shirley  See Patient List for primary contact information  Call [phone] to page    1st Call - Day Intern (R1):    2nd Call - Day Sr. Resident (R2/R3):            Reason for interval visit  (Principal Problem)   Chest pain with NSTEMI     Interval Problem Daily Status Update  (24 hours, problem oriented, brief subjective history, new lab/imaging data pertinent to that problem)     Mr Sepulveda is a 72yo male who came with the complaint of chest pain ,troponin levels( 829) and EKG findings of ST depression in anterolateral leads is diagnosed with NSTEMI       S: Patient is stable , alert and oriented. Denies chest pain ,SOB or palpitations. Vitals stable . Sinus rhythm. Does complaint of mild dizziness with nausea which is improving compared to yesterday.   Patient was confused about whether to get the cardiac cath or not . Early in the morning when cardiology talked to the patient , he denied the procedure But later he agreed to go through the procedure and cardiology will schedule him for a Cardiac cath tomorrow . .   Patient doesn't have HD scheduled for today. He had 3 session in last 3 days.  Nephrology on board .   PAtient underwent cardiac nuclear stress test which was abnormal with non reversible ischemic changes ( consistent  with infarction)      ROS   Constitutional: Negative for chills, fever and weight loss.   HENT: Negative for ear pain, hearing loss and tinnitus.    Eyes: Negative for blurred vision, double vision and photophobia.   Respiratory: Positive for productive cough Negative for hemoptysis and wheezing.    Cardiovascular: Denies chest pain . Negative for palpitations,  orthopnea, claudication and leg swelling.   Gastrointestinal: Negative for abdominal pain, constipation, diarrhea, heartburn   Genitourinary: Negative for dysuria and urgency.   Musculoskeletal: Negative for myalgias.   Skin: Negative for rash.   Neurological: positive for mild  dizziness and nausea. Negative for tingling, sensory change, speech change and headaches.       Disposition/Barriers to discharge  NSTEMI with chest pain     Consultants/Specialty  Cardiology Dr.Benjamin Klayani MOTA  Nephrology Dr.Soni YUKI Agee  PCP: Patito Edgar M.D.      Quality Measures  Quality-Core Measures   Reviewed items::  EKG reviewed, Labs reviewed, Medications reviewed and Radiology images reviewed  Tomas catheter::  No Tomas  DVT prophylaxis - mechanical:  SCDs          Physical Exam       Vitals:    11/15/19 0800 11/15/19 1220 11/15/19 1620 11/15/19 1657   BP: 117/69 120/52 128/65    Pulse: 74 85 85 78   Resp: 16 16 16    Temp: 36.7 °C (98 °F) 37 °C (98.6 °F) 36.7 °C (98.1 °F)    TempSrc: Temporal Temporal Temporal    SpO2: 97% 98% 100%    Weight:       Height:         Body mass index is 24.95 kg/m². Weight: 68 kg (149 lb 14.6 oz)  Oxygen Therapy:  Pulse Oximetry: 100 %, O2 (LPM): 0, O2 Delivery: None (Room Air)    Physical Exam    Constitutional: He is oriented to person, place, and time and well-developed, well-nourished, and in no distress. No distress.   HENT:   Head: Normocephalic and atraumatic.   Mouth/Throat: No oropharyngeal exudate.   Eyes: Pupils are equal, round, and reactive to light. EOM are normal.   Neck: Normal range of motion. Neck supple.   Cardiovascular: Normal rate, regular rhythm and intact distal pulses.   Pulmonary/Chest: Effort normal and breath sounds normal. No respiratory distress. He has no wheezes. He has no rales.   Abdominal: Soft. Bowel sounds are normal. He exhibits no distension. There is no tenderness. There is no rebound.   Musculoskeletal: Normal range of motion.          General: No edema.   Neurological: He is alert and oriented to person, place, and time. No cranial nerve deficit. Gait normal. Coordination normal.   Skin: Skin is warm and dry. No erythema.       Assessment/Plan     * NSTEMI (non-ST elevated myocardial infarction) ([facility])  Assessment & Plan  trop T peaked at 829 on admission but  treaded down   EKG showing ST depression and T wave changes in emperatriz lateral leads.   MPI shows infarct in the mid anterior and apical portions of LV   ECHO showed ef 50-55%. Thin and hypokinetic mid anteroseptal  wall.  Plan:  -Continue to monitor on  telemetry  -Continue pulse ox  -Continue nitroglycerin prn for chest pain  - Continue asa and atorvastatin and Plavix   - Cardiology Increased metoprolol 100 mg twice daily  -Patient is  willing to have cardiac catheterization- scheduled for tomorrow   -N.p.o. at midnight  - discontinued the  heparin gtt on 11/12/19 for possible procedures.    -Cardiology input is appreciated for further management of the NSTEMI  - SCDs for DVT PPx        End stage renal disease on dialysis ([facility])- (present on admission)  Assessment & Plan  Patient has history of chronic kidney disease stage V - ESRD  On admission his creatinine is 7.23, bun 125  Patient started on HD on 11/12/19 with tunnel cath in place , had 3 sessions so far      Plan:  Patient had 3 HD so far  No HD done on 11/15/19  As per nephrology HD q TTHS and prn schedule  Pt will need outpt HD arrangements  Quantiferon gold ordered to facilitate outpt HD placement  Avoid nephrotoxins  Continue renal diet  Continue  phoslo  With meals   Continue to monitor the labs               Hyperthyroidism- (present on admission)  Assessment & Plan  No temperature intolerance. No change in weight, hair/skin quality, BMs.   No tremors, weakness.  No peripheral swelling.  No mood changes.  FH: son  US thyroid showed benign thyroid nodules-(benign bx 8/23/18)  Patient supposed to take methimazole 5 mg daily as per  the PCP notes but patient is not compliant .  11/13/19 TSH low = <0.005 and high free T4 of 4.16     Plan:  Continue with methimazole 5 mg daily  Counseling for medication compliance   Outpatient follow up with PCP or endocrinology after diacharge    Secondary hyperparathyroidism of renal origin (HCC)  Assessment & Plan  Intact PTH of 503 which is very high   Patient has ESRD     Plan:  Calcitriol added  Vitamin D labs pending     Diabetes mellitus type 2, controlled, with complications (HCC)- (present on admission)  Assessment & Plan  Patient has history of type 2 diabetes mellitus  At home he is on insulin Lantus 36 units every evening, not in any short-acting insulin.  On admission serum glucose 142  Hemoglobin A1c 5.3 in September 2019    Continue  insulin Lantus 14 units   Continue the  sliding scale Insulin   Hypoglycemia protocol  Continue to monitor    Anemia of chronic disease- (present on admission)  Assessment & Plan  Stable   Anemia--secondary to CKD  Normocytic anemia   Hb dropped to 6.8 on 11/12/19 and received 1 unit of blood transfused   Currently the Hb is 8.5   Plan:  CTM  Blood transfusion if the Hb < 7   Nephrology - epoetin with HD      Gastroesophageal reflux disease- (present on admission)  Assessment & Plan  Patient has history of GERD.  He is on pantoprazole at home        Essential hypertension- (present on admission)  Assessment & Plan  History of hypertension  On admission blood pressure 114/60  Holding home medication , continue to monitor, restart if BP is elevated

## 2019-11-16 NOTE — CARE PLAN
Problem: Safety  Goal: Will remain free from injury  11/16/2019 0825 by Becca Carrion R.N.  Outcome: PROGRESSING AS EXPECTED  11/16/2019 0825 by Becca Carrion R.N.  Reactivated  Goal: Will remain free from falls  Outcome: PROGRESSING AS EXPECTED  Bed locked and lowest position. Bed alarm on. Treaded socks on. Call light and belongings within reach. Pt educated to call for assistance. Pt verbalized understanding. Hourly rounding in place.        Problem: Knowledge Deficit  Goal: Knowledge of disease process/condition, treatment plan, diagnostic tests, and medications will improve  Outcome: PROGRESSING AS EXPECTED  Goal: Knowledge of the prescribed therapeutic regimen will improve  Outcome: PROGRESSING AS EXPECTED  Discussed POC and medications with pt, pt verbalized understanding.

## 2019-11-16 NOTE — THERAPY
Speech Language Therapy dysphagia treatment completed.   Functional Status:  Called by RN to see patient for swallowing reassessment as he is wanting to be able to have regular water.  Patient awake, alert and oriented to person, place, month, date, year and episode with minimal confusion to recent events.  Vocal quality with improved intensity from yesterday.   Patient sitting up in chair for session.  Presentation of PO consisted of ice, nectars, thin liquids, purees, soft solids and solids.  Laryngeal elevation was minimally weak, and timing of swallow was minimally delayed (ranging from 1-3 seconds).  Patient able to consume nectars and purees without any overt S/Sx of aspiration noted.  On 8 ounces of thin liquids, he had throat clearing following swallow X2, wet voice X2 and delayed coughing X2 which can be concerning for possible penetration/aspiration.  On mixed consistencies, he had coughing following swallow 25% of the time (often correlated with him talking during oral phase of swallow).  I did implement a double swallow strategy which did minimize any vocal quality changes and S/sx of aspiration (especially on thin liquids), but patient was not always consistent with follow through for the second swallow.  On single pieces of fruit as well as on regular textures, he was able to swallow with very subtle throat clearing X2 and no other overt S/Sx of aspiration.  At this time, would recommend continuation of dysphagia II diet with nectar thick liquids (cardiac/renal, but will allow sips of thin liquids in between meals only with close monitoring for any S/Sx of aspiration. Extensive education to patient and RN regarding ongoing concerns for aspiration.  Patient verbalized understanding.  Patient will be NPO at midnight for cath lab procedure tomorrow.  SLP will continue to follow.     Recommendations: dysphagia II diet with NTL (cardiac/renal)--OK FOR SIPS OF THIN LIQUIDS IN BETWEEN MEALS  1) 90* angle for  "all PO intake  2) Small bites and sips  3) NO STRAWS  4) DOUBLE SWALLOW  5) TV off when eating  6) STOP PO with any difficulty     Plan of Care: Will benefit from Speech Therapy 3 times per week     Post-Acute Therapy: Recommend inpatient transitional care services vs home health transitional care services for continued speech therapy services depending on progress     See \"Rehab Therapy-Acute\" Patient Summary Report for complete documentation  "

## 2019-11-16 NOTE — PROGRESS NOTES
Cardiology Attending Note:    I have seen and examined the patient. I personally reviewed all elements of the history and repeated the physical exam as appropriate.  I have also reviewed the labs, imaging, and EKG.    In summary, Luis Sepulveda is a 73-year-old male with a history of coronary artery disease, myocardial infarction, coronary stents 7 years ago [facility], Dr. Simms, end-stage renal disease, right arm AV fistula which failed, Intracath, hemodialysis, diabetes mellitus, hypertension, dyslipidemia, hyperthyroidism who was admitted to [facility] 11/11/2019 with a non-ST elevation myocardial infarction.  EKG shows sinus rhythm, left anterior hemiblock, right bundle branch block and lateral ST segment depression.  Follow-up EKG showed atrial fibrillation.  MPI on 11/14/2019 showed ejection fraction 46% and infarction of the mid anterior and apical left ventricle with no ischemia.  Echocardiogram 11/12/2019 showed ejection fraction 50-55% with mid septal wall motion abnormality.    Vital signs reviewed and are stable.  General: Elderly, frail.  Neck: Dialysis catheter in place.  Chest: Symmetrical.  No wheezes, rales or rhonchi.  Cardiac: Regular rate and rhythm systolic murmur.  Extremities: AV fistula right arm.  No edema.  Pulses: 2+ femoral pulses.    Assessment:  1.  N STEMI.  2.  CAD.  Previous coronary stents 7 years ago, vessel unknown.  3.  Abnormal MPI.  4.  Left ventricular ejection fraction 55%  5.  ESRD on hemodialysis.  6.  Diabetes mellitus.  7.  Hypertension.  8.  Dyslipidemia.  9.  Hyperthyroidism.  10.  Anemia.  11.  General frailty.    Recommendation:  1.  The patient will undergo repeat cardiac catheterization to assess the need for repeat coronary intervention as previously prescribed and recommended by Dr. John Auguste and Dr. James Vivas and have discussed potential risks, benefits and treatment scenarios to the patient and he is agreeable to  proceed.  2.  Continue maximal guideline directed medical therapy for the patient's coronary artery disease currently on aspirin, atorvastatin, metoprolol and clopidogrel.  3.  Optimize management of ongoing coronary risk factors to prevent future coronary events.    Thank you for allowing us to participate in the care of this patient, and please do not hesitate to call or page if any clarification of our recommendation would be useful.    Kulwant Osborn MD  Cardiologist, [facility] [place]

## 2019-11-16 NOTE — CARE PLAN
Problem: Safety  Goal: Will remain free from falls  Outcome: PROGRESSING AS EXPECTED  Patient educated on level of risk. Patient verbalized understanding. Bed is locked and in lowest position. Bed alarm is on. Call light within reach. Hourly rounding in place, will continue to monitor.       Problem: Discharge Barriers/Planning  Goal: Patient's continuum of care needs will be met  Outcome: PROGRESSING AS EXPECTED  Patient is scheduled for a cardiac cath tomorrow.

## 2019-11-16 NOTE — ASSESSMENT & PLAN NOTE
Rate controlled with metoprolol, not on anti coag  CHADSVasc 4 - will need anticoag.   Cardiology suggested Eliquis   AVF is planned as outpatient     Plan:   start on Eliquis 5 mg twice daily  Stop aspirin  Continue Plavix

## 2019-11-17 LAB
ANION GAP SERPL CALC-SCNC: 5 MMOL/L (ref 0–11.9)
BUN SERPL-MCNC: 23 MG/DL (ref 8–22)
CALCIUM SERPL-MCNC: 7.6 MG/DL (ref 8.5–10.5)
CHLORIDE SERPL-SCNC: 106 MMOL/L (ref 96–112)
CO2 SERPL-SCNC: 27 MMOL/L (ref 20–33)
CREAT SERPL-MCNC: 2.37 MG/DL (ref 0.5–1.4)
GLUCOSE BLD-MCNC: 112 MG/DL (ref 65–99)
GLUCOSE BLD-MCNC: 123 MG/DL (ref 65–99)
GLUCOSE BLD-MCNC: 93 MG/DL (ref 65–99)
GLUCOSE BLD-MCNC: 97 MG/DL (ref 65–99)
GLUCOSE SERPL-MCNC: 110 MG/DL (ref 65–99)
POTASSIUM SERPL-SCNC: 4.1 MMOL/L (ref 3.6–5.5)
SODIUM SERPL-SCNC: 138 MMOL/L (ref 135–145)

## 2019-11-17 PROCEDURE — 99232 SBSQ HOSP IP/OBS MODERATE 35: CPT | Performed by: INTERNAL MEDICINE

## 2019-11-17 PROCEDURE — 770020 HCHG ROOM/CARE - TELE (206)

## 2019-11-17 PROCEDURE — 36415 COLL VENOUS BLD VENIPUNCTURE: CPT

## 2019-11-17 PROCEDURE — A9270 NON-COVERED ITEM OR SERVICE: HCPCS | Performed by: INTERNAL MEDICINE

## 2019-11-17 PROCEDURE — 700102 HCHG RX REV CODE 250 W/ 637 OVERRIDE(OP): Performed by: INTERNAL MEDICINE

## 2019-11-17 PROCEDURE — 700105 HCHG RX REV CODE 258: Performed by: NURSE PRACTITIONER

## 2019-11-17 PROCEDURE — 80048 BASIC METABOLIC PNL TOTAL CA: CPT

## 2019-11-17 PROCEDURE — A9270 NON-COVERED ITEM OR SERVICE: HCPCS | Performed by: STUDENT IN AN ORGANIZED HEALTH CARE EDUCATION/TRAINING PROGRAM

## 2019-11-17 PROCEDURE — 700102 HCHG RX REV CODE 250 W/ 637 OVERRIDE(OP): Performed by: STUDENT IN AN ORGANIZED HEALTH CARE EDUCATION/TRAINING PROGRAM

## 2019-11-17 PROCEDURE — 99232 SBSQ HOSP IP/OBS MODERATE 35: CPT | Mod: GC | Performed by: INTERNAL MEDICINE

## 2019-11-17 PROCEDURE — 700102 HCHG RX REV CODE 250 W/ 637 OVERRIDE(OP): Performed by: NURSE PRACTITIONER

## 2019-11-17 PROCEDURE — 82962 GLUCOSE BLOOD TEST: CPT | Mod: 91

## 2019-11-17 PROCEDURE — A9270 NON-COVERED ITEM OR SERVICE: HCPCS | Performed by: NURSE PRACTITIONER

## 2019-11-17 RX ADMIN — METHIMAZOLE 5 MG: 5 TABLET ORAL at 05:46

## 2019-11-17 RX ADMIN — Medication 667 MG: at 11:32

## 2019-11-17 RX ADMIN — Medication 667 MG: at 05:49

## 2019-11-17 RX ADMIN — POTASSIUM CHLORIDE 20 MEQ: 20 TABLET, EXTENDED RELEASE ORAL at 05:49

## 2019-11-17 RX ADMIN — METOPROLOL TARTRATE 100 MG: 50 TABLET, FILM COATED ORAL at 05:46

## 2019-11-17 RX ADMIN — SODIUM CHLORIDE: 9 INJECTION, SOLUTION INTRAVENOUS at 03:45

## 2019-11-17 RX ADMIN — APIXABAN 5 MG: 5 TABLET, FILM COATED ORAL at 17:36

## 2019-11-17 RX ADMIN — Medication 667 MG: at 17:36

## 2019-11-17 RX ADMIN — METOPROLOL TARTRATE 100 MG: 50 TABLET, FILM COATED ORAL at 17:36

## 2019-11-17 RX ADMIN — ASPIRIN 81 MG: 81 TABLET, COATED ORAL at 05:47

## 2019-11-17 RX ADMIN — CLOPIDOGREL BISULFATE 75 MG: 75 TABLET ORAL at 05:47

## 2019-11-17 RX ADMIN — ATORVASTATIN CALCIUM 80 MG: 80 TABLET, FILM COATED ORAL at 17:35

## 2019-11-17 RX ADMIN — APIXABAN 5 MG: 5 TABLET, FILM COATED ORAL at 11:32

## 2019-11-17 RX ADMIN — SODIUM CHLORIDE: 9 INJECTION, SOLUTION INTRAVENOUS at 13:54

## 2019-11-17 ASSESSMENT — ENCOUNTER SYMPTOMS
FOCAL WEAKNESS: 0
CHILLS: 0
DIARRHEA: 0
MUSCULOSKELETAL NEGATIVE: 1
WEAKNESS: 1
PSYCHIATRIC NEGATIVE: 1
NAUSEA: 0
RESPIRATORY NEGATIVE: 1
EYES NEGATIVE: 1
HEADACHES: 0
ABDOMINAL PAIN: 0
DIZZINESS: 0
CARDIOVASCULAR NEGATIVE: 1
FEVER: 0

## 2019-11-17 NOTE — PROGRESS NOTES
[facility] Nephrology Daily Progress Note    Author: Yuridia Agee M.D.  Date of Service  11/16/2019    Chief Complaint  Follow up New ESRD    Interval Problem Update  This is a 73-year-old  male with past medical history significant for chronic kidney disease stage V, not yet   initiated on hemodialysis, hypertension, diabetes mellitus type 2, coronary artery disease status post stent in the past, anemia secondary to chronic kidney disease, CKD bone mineral disorder/renal osteodystrophy, hyperparathyroidism treated with temozolomide, who was admitted with complaints of chest discomfort and shortness of breath x4 days and was found to have a non-ST elevation MI and now noted to have worsening renal function as well as severe metabolic acidosis.  Patient reports that for the past 4 days, he has not been feeling well.  He has been having this vague chest discomfort as well as some shortness of breath.  He came to the ER for further evaluation last night and was found to have an elevated troponin level consistent with a non-ST elevation MI.  He has been seen by cardiology who recommends medical management at this time.  Patient does have a history of chronic kidney disease stage V, for which he follows with [facility].  His GFR has been around 9-10 mL per minute, but he has been asymptomatic as an outpatient, and so, he has not yet needed to initiate dialysis.  He has a history of a failed AV fistula and he was referred for vascular surgery to have a new AV fistula created and he is in the process of this.  The patient reports that at this time, he does not have any shortness of breath and his chest pain has resolved.  His labs show an elevated BUN of 129, creatinine is 6.53 and a GFR of 8 mL per minute and he has a significant metabolic acidosis with a serum bicarbonate level of 7 and this was a level that I was at last night on admission as well.  He only recently was started on a  bicarbonate drip with one-half normal saline with 75 mEq of sodium bicarbonate at 75 mL an hour by the primary service.  His troponin level has peaked at 816 and is now trending down.    DAILY NEPHROLOGY SUMMARY  11/13/19--No events, tunneled HD catheter placed yest by IR and HD initiated, BP low during HD and no fluid removed, pt feels nauseous and weak today, BP stable, acidosis improved, serum K low, BUN improved with HD, getting PRBC transfusion today  11/14/19--Hd treatment #3 today, he reports occasional nausea, and sob.  He reports tolerating HD well and believes it is helping with SOB.    11/15/19--No HD today, Plan for cardiac cath tomorrow. Pt sitting up at bedside, he is feeling well today.  Tolerated HD well yesterday.  Denies pain, n/v, sob.  He is NPO for cardiac cath.    11/16/19--No events, cardiac cath done today and recommendation for medical management and no stents placed, BP stable, seen on dialysis this afternoon    Review of Systems  Review of Systems   Constitutional: Positive for malaise/fatigue. Negative for chills and fever.   HENT: Negative.    Eyes: Negative.    Respiratory: Negative.    Cardiovascular: Negative.    Gastrointestinal: Negative for abdominal pain, diarrhea and nausea.   Genitourinary: Negative.    Musculoskeletal: Negative.    Skin: Positive for itching. Negative for rash.   Neurological: Positive for weakness. Negative for dizziness, focal weakness and headaches.   Endo/Heme/Allergies: Negative.    Psychiatric/Behavioral: Negative.         Physical Exam  Temp:  [36.4 °C (97.6 °F)-37.4 °C (99.4 °F)] 37.1 °C (98.7 °F)  Pulse:  [66-82] 75  Resp:  [16-20] 16  BP: (119-141)/(55-74) 128/58  SpO2:  [95 %-100 %] 98 %    Physical Exam  Vitals signs and nursing note reviewed.   Constitutional:       General: He is not in acute distress.     Appearance: Normal appearance. He is ill-appearing.   HENT:      Head: Normocephalic and atraumatic.      Right Ear: External ear normal.       Left Ear: External ear normal.      Nose: Nose normal.      Mouth/Throat:      Mouth: Mucous membranes are moist.      Pharynx: Oropharynx is clear. No oropharyngeal exudate.   Eyes:      General: No scleral icterus.     Extraocular Movements: Extraocular movements intact.      Conjunctiva/sclera: Conjunctivae normal.   Neck:      Musculoskeletal: Normal range of motion and neck supple. No muscular tenderness.   Cardiovascular:      Rate and Rhythm: Normal rate and regular rhythm.      Heart sounds: Normal heart sounds. No murmur.   Pulmonary:      Effort: Pulmonary effort is normal. No respiratory distress.      Breath sounds: Normal breath sounds. No wheezing or rales.   Abdominal:      General: Abdomen is flat. Bowel sounds are normal. There is no distension.      Palpations: Abdomen is soft.      Tenderness: There is no tenderness.   Musculoskeletal: Normal range of motion.         General: No swelling.   Lymphadenopathy:      Cervical: No cervical adenopathy.   Skin:     General: Skin is warm and dry.      Findings: No rash.   Neurological:      General: No focal deficit present.      Mental Status: He is alert and oriented to person, place, and time.      Motor: No weakness.   Psychiatric:         Mood and Affect: Mood normal.         Behavior: Behavior normal.         Fluids  No intake or output data in the 24 hours ending 11/16/19 1641    Laboratory  Recent Labs     11/14/19  0207 11/15/19  0236 11/16/19  0316   WBC 6.5 8.0 7.6   RBC 2.84* 3.00* 2.88*   HEMOGLOBIN 8.5* 9.2* 8.8*   HEMATOCRIT 25.2* 27.0* 26.5*   MCV 88.7 90.0 92.0   MCH 29.9 30.7 30.6   MCHC 33.7 34.1 33.2*   RDW 45.2 45.6 46.4   PLATELETCT 170 173 171   MPV 9.5 9.5 9.7     Recent Labs     11/14/19  0207 11/15/19  0236 11/16/19  0316   SODIUM 141 141 137   POTASSIUM 3.6 4.1 4.2   CHLORIDE 106 105 107   CO2 24 26 22   GLUCOSE 118* 102* 121*   BUN 45* 25* 38*   CREATININE 3.43* 2.75* 3.83*   CALCIUM 7.4* 8.2* 8.1*     Recent Labs      11/13/19 1944   APTT 94.3*     No results for input(s): NTPROBNP in the last 72 hours.        Assessment:  1. PAT on CKD Stage V--progression of his CKD, now new ESRD  --I had a long talk with patient and he has been reluctant to start HD in the past but he is now agreeable  --R Chest PC placed 11/12 and HD initiated   2. NSTEMI--medical management for now per cardiology  --Cardiac Cath done 11/16, no intervention and medical management recommended by cardiology  3. Metabolic Acidosis--resolved  4. HTN--stable  5. DM II--per primary svc  6. Renal Osteodystrophy/CKD Bone Mineral Disorder--phos elevated  --calcium acetate started   --  7. Hyperthyroidism--on methimazone  8. Anemia--secondary to CKD  --No need for IV Iron  -- PRBC transfusion 11/13  9. Secondary Hyperparathyroidism  10. SOB--resolved  11. Nausea--anti-emetics per primary svc  12. Pruritis--likely related to hyperphosphatemia  13. Hypokalemia--resolved  14. Moderate Protein-Calorie Malnutrition     Plan:  --HD today (SAT)   --Pt tentatively accepted at [facility] on TTS schedule pending quantiferon result to be sent to them  --Medical management of CAD per cardiology   --Epogen with HD  --Transfuse PRN for hgb less than 7  --Continue phoslo with meals   --Continue calcitriol

## 2019-11-17 NOTE — CARE PLAN
Problem: Safety  Goal: Will remain free from falls  Outcome: PROGRESSING AS EXPECTED  Patient educated on level of risk. Patient verbalized understanding. Bed is locked and in lowest position. Bed alarm is on. Call light within reach. Hourly rounding in place, will continue to monitor.       Problem: Knowledge Deficit  Goal: Knowledge of the prescribed therapeutic regimen will improve  Outcome: PROGRESSING AS EXPECTED  Patient educated on POC and medications provided per MAR. Patient stated understanding. All questions answered at this time. Will continue to monitor.

## 2019-11-17 NOTE — PROGRESS NOTES
Internal Medicine Interval Note  Note Author: Jenny Holland M.D.     Name Luis Sepulveda     1945   Age/Sex 73 y.o. male   MRN [id number]   Code Status DNAR/ Intubation ok      After 5PM or if no immediate response to page, please call for cross-coverage  Attending/Team:  /shirley  See Patient List for primary contact information  Call [phone] to page    1st Call - Day Intern (R1):    2nd Call - Day Sr. Resident (R2/R3):            Reason for interval visit  (Principal Problem)   Chest pain with NSTEMI     Interval Problem Daily Status Update  (24 hours, problem oriented, brief subjective history, new lab/imaging data pertinent to that problem)     Mr Sepulveda is a 74yo male who came with the complaint of chest pain ,troponin levels( 829) and EKG findings of ST depression in anterolateral leads is diagnosed with NSTEMI     - No acute events overnight  - No acute complains,  No chest pain / SOB or dizziness and nausea/vomiting.  - Cardiac cath done 19 - patent coronary stents proximal to distal, left anterior descending artery. No stents were done. Cardiology (Dr. Osbron) recommended F/U with EKG and maximal medical therapy.  - TB quant neg, has outpatient HD chair set with [facility]. will wait  for confirmation of outpatient dialysis and then will discharge the patient.  -CHADSVASc score of 4.  Needs anticoagulation therapy.  aVF is planned outpatient.  Will start on Eliquis 5 mg twice daily and stop aspirin.  Nephrology, Dr. Agee is updated.  -We will continue with Plavix.      ROS   Constitutional: Negative for chills, fever and weight loss.   HENT: Negative for ear pain, hearing loss and tinnitus.    Eyes: Negative for blurred vision, double vision and photophobia.   Respiratory: Positive for productive cough Negative for hemoptysis and wheezing.    Cardiovascular: Denies chest pain . Negative for palpitations, orthopnea, claudication and leg  swelling.   Gastrointestinal: Negative for abdominal pain, constipation, diarrhea, heartburn   Genitourinary: Negative for dysuria and urgency.   Musculoskeletal: Negative for myalgias.   Skin: Negative for rash.   Neurological: positive for mild  dizziness and nausea. Negative for tingling, sensory change, speech change and headaches.       Disposition/Barriers to discharge  NSTEMI with chest pain     Consultants/Specialty  Cardiology Dr.Benjamin Kalyani MOTA  Nephrology Dr.Soni YUKI Agee  PCP: Patito Edgar M.D.      Quality Measures  Quality-Core Measures   Reviewed items::  EKG reviewed, Labs reviewed, Medications reviewed and Radiology images reviewed  Tomas catheter::  No Tomas  DVT prophylaxis - mechanical:  SCDs          Physical Exam       Vitals:    11/16/19 2018 11/17/19 0000 11/17/19 0404 11/17/19 0815   BP: 125/48 125/53 117/48 109/78   Pulse: 79 75 73 78   Resp: 16 16 16 16   Temp: 37.4 °C (99.3 °F) 37.6 °C (99.7 °F) 37.6 °C (99.6 °F) 37.7 °C (99.8 °F)   TempSrc: Temporal Temporal Temporal Temporal   SpO2: 98% 97% 96% 93%   Weight: 71.4 kg (157 lb 6.5 oz)      Height:         Body mass index is 26.19 kg/m². Weight: 71.4 kg (157 lb 6.5 oz)  Oxygen Therapy:  Pulse Oximetry: 93 %, O2 (LPM): 0, O2 Delivery: None (Room Air)    Physical Exam    Constitutional: He is oriented to person, place, and time and well-developed, well-nourished, and in no distress. No distress.   HENT:   Head: Normocephalic and atraumatic.   Mouth/Throat: No oropharyngeal exudate.   Eyes: Pupils are equal, round, and reactive to light. EOM are normal.   Neck: Normal range of motion. Neck supple.   Cardiovascular: Normal rate, regular rhythm and intact distal pulses.   Pulmonary/Chest: Effort normal and breath sounds normal. No respiratory distress. He has no wheezes. He has no rales.   Abdominal: Soft. Bowel sounds are normal. He exhibits no distension. There is no tenderness. There is no rebound.   Musculoskeletal: Normal  range of motion.         General: No edema.   Neurological: He is alert and oriented to person, place, and time. No cranial nerve deficit. Gait normal. Coordination normal.   Skin: Skin is warm and dry. No erythema.       Assessment/Plan     * NSTEMI (non-ST elevated myocardial infarction) ([facility])  Assessment & Plan  Cath done on 11/16 - patent coronary stents proximal to distal, left anterior descending artery. No stents were done. Cardiology (Dr. Osborn) recommended F/U with EKG and maximal medical therapy.  -Continue nitroglycerin prn for chest pain  -Continue atorvastatin and Plavix   -Discontinue aspirin  -Patient is started on Eliquis twice daily 5 mg        End stage renal disease on dialysis ([facility])- (present on admission)  Assessment & Plan  Patient has history of chronic kidney disease stage V - ESRD  Patient started on HD on 11/12/19 with tunnel cath in place  Has outpatient HD chair set with [facility] - for TTS but yet to be confirmed  TB quant neg   Renal dose all meds     Plan:  We will discharge the patient once outpatient dialysis is confirmed           Paroxysmal A-fib ([facility])  Assessment & Plan  Rate controlled with metoprolol, not on anti coag  CHADSVasc 4 - will need anticoag.   Cardiology suggested Eliquis   AVF is planned as outpatient     Plan:   start on Eliquis 5 mg twice daily  Stop aspirin  Continue Plavix    Hyperthyroidism- (present on admission)  Assessment & Plan  Asymptomatic   US thyroid showed benign thyroid nodules-(benign bx 8/23/18)  Patient supposed to take methimazole 5 mg daily as per the PCP notes but patient is not compliant .  11/13/19 TSH low = <0.005 and high free T4 of 4.16   Continue with methimazole 5 mg daily      Secondary hyperparathyroidism of renal origin ([facility])  Assessment & Plan  2/2 ESRD    Plan:  Calcitriol added      Diabetes mellitus type 2, controlled, with complications ([facility])- (present on admission)  Assessment & Plan  Patient has history of type 2 diabetes  mellitus  At home he is on insulin Lantus 36 units every evening, not in any short-acting insulin.  On admission serum glucose 142  Hemoglobin A1c 5.3 in September 2019    Continue  insulin Lantus 14 units   Continue the  sliding scale Insulin   Hypoglycemia protocol  Continue to monitor    Anemia of chronic disease- (present on admission)  Assessment & Plan  Stable   Anemia--secondary to CKD  Normocytic anemia   Hb dropped to 6.8 on 11/12/19 and received 1 unit of blood transfused   Currently stable   CTM  Blood transfusion if the Hb < 7   Nephrology - epoetin with HD      Gastroesophageal reflux disease- (present on admission)  Assessment & Plan  Patient has history of GERD.  He is on pantoprazole at home        Essential hypertension- (present on admission)  Assessment & Plan  History of hypertension  On admission blood pressure 114/60  Holding home medication , continue to monitor, restart if BP is elevated

## 2019-11-17 NOTE — CARE PLAN
Problem: Safety  Goal: Will remain free from falls  Outcome: PROGRESSING AS EXPECTED  Note:   Pt on bed alarm, calls appropriately, call bell within reach     Problem: Discharge Barriers/Planning  Goal: Patient's continuum of care needs will be met  Outcome: PROGRESSING AS EXPECTED  Note:   Awaiting approval from outpatient dialysis clinic

## 2019-11-17 NOTE — PROGRESS NOTES
Cardiology Attending Note:     I have seen and examined the patient. I personally reviewed all elements of the history and repeated the physical exam as appropriate.  I have also reviewed the labs, imaging, and EKG.     In summary, Luis Sepulveda is a 73-year-old male with a history of coronary artery disease, myocardial infarction, coronary stents 7 years ago [facility], Dr. Simms, end-stage renal disease, right arm AV fistula which failed, Intracath, hemodialysis, diabetes mellitus, hypertension, dyslipidemia, hyperthyroidism who was admitted to [facility] 11/11/2019 with a non-ST elevation myocardial infarction.  EKG shows sinus rhythm, left anterior hemiblock, right bundle branch block and lateral ST segment depression.  Follow-up EKG showed atrial fibrillation.  MPI on 11/14/2019 showed ejection fraction 46% and infarction of the mid anterior and apical left ventricle with no ischemia.  Echocardiogram 11/12/2019 showed ejection fraction 50-55% with mid septal wall motion abnormality.   11/7: No cardiac symptoms.  Tolerated angiogram yesterday.     Vital signs reviewed and are stable.  General: Elderly, frail.  Neck: Dialysis catheter in place.  Chest: Symmetrical.  No wheezes, rales or rhonchi.  Cardiac: Regular rate and rhythm systolic murmur.  Extremities: AV fistula right arm.  No edema.  Right femoral cath site without hematoma.  Pulses: 2+ femoral pulses.    MPI 11/14/2019  Left ventricular ejection fraction 46%.  Infarxt in the mid anterior and apical portions of LV.  No LV ischemia.    ECHOCARDIOGRAM 11/12/2019  Left ventricular ejection fraction 50-55%.  Focal hypokinesis mid anterior septum.    Assessment:  1.  N STEMI.  2.  CAD.  Previous coronary stents 7 years ago, vessel unknown.  3.  Abnormal MPI.  4.  Left ventricular ejection fraction 55%  5.  ESRD on hemodialysis.  6.  Diabetes mellitus.  7.  Hypertension.  8.  Dyslipidemia.  9.  Hyperthyroidism.  10.   Anemia.  11.  General frailty.  12.  PAF on this admission.    Recommendation:  1.  Coronary angiogram 11/16 demonstrated patent stents jailed diagonal branch with an ostial stenosis which will be treated medically.  2.  Stable for discharge from a cardiac standpoint on current therapy including apixaban, clopidogrel, metoprolol and atorvastatin.  3.  Will enroll and [facility], had previously been under the care of Dr. Simms at [facility] cardiology in the remote past..     Kulwant Osborn MD  Cardiologist, [facility] [place]

## 2019-11-17 NOTE — PROGRESS NOTES
Assumed care of patient, bedside report received from Edelmira MONTOYA. Updated on POC, call light within reach and fall precautions in place. Bed locked and in lowest position. Patient instructed to call for assistance before getting out of bed. All questions answered, no other needs at this time.

## 2019-11-17 NOTE — PROGRESS NOTES
[facility] Nephrology Daily Progress Note    Author: Yuridia Agee M.D.    Date of Service  11/17/2019    Chief Complaint  Follow up New ESRD    Interval Problem Update  This is a 73-year-old  male with past medical history significant for chronic kidney disease stage V, not yet   initiated on hemodialysis, hypertension, diabetes mellitus type 2, coronary artery disease status post stent in the past, anemia secondary to chronic kidney disease, CKD bone mineral disorder/renal osteodystrophy, hyperparathyroidism treated with temozolomide, who was admitted with complaints of chest discomfort and shortness of breath x4 days and was found to have a non-ST elevation MI and now noted to have worsening renal function as well as severe metabolic acidosis.  Patient reports that for the past 4 days, he has not been feeling well.  He has been having this vague chest discomfort as well as some shortness of breath.  He came to the ER for further evaluation last night and was found to have an elevated troponin level consistent with a non-ST elevation MI.  He has been seen by cardiology who recommends medical management at this time.  Patient does have a history of chronic kidney disease stage V, for which he follows with [facility].  His GFR has been around 9-10 mL per minute, but he has been asymptomatic as an outpatient, and so, he has not yet needed to initiate dialysis.  He has a history of a failed AV fistula and he was referred for vascular surgery to have a new AV fistula created and he is in the process of this.  The patient reports that at this time, he does not have any shortness of breath and his chest pain has resolved.  His labs show an elevated BUN of 129, creatinine is 6.53 and a GFR of 8 mL per minute and he has a significant metabolic acidosis with a serum bicarbonate level of 7 and this was a level that I was at last night on admission as well.  He only recently was started on a  bicarbonate drip with one-half normal saline with 75 mEq of sodium bicarbonate at 75 mL an hour by the primary service.  His troponin level has peaked at 816 and is now trending down.    DAILY NEPHROLOGY SUMMARY  11/13/19--No events, tunneled HD catheter placed yest by IR and HD initiated, BP low during HD and no fluid removed, pt feels nauseous and weak today, BP stable, acidosis improved, serum K low, BUN improved with HD, getting PRBC transfusion today  11/14/19--Hd treatment #3 today, he reports occasional nausea, and sob.  He reports tolerating HD well and believes it is helping with SOB.    11/15/19--No HD today, Plan for cardiac cath tomorrow. Pt sitting up at bedside, he is feeling well today.  Tolerated HD well yesterday.  Denies pain, n/v, sob.  He is NPO for cardiac cath.    11/16/19--No events, cardiac cath done today and recommendation for medical management and no stents placed, BP stable, seen on dialysis this afternoon  11/17/19--No events, BP stable, tolerated HD yesterday, no new complaints, remains on maintenance IVF's    Review of Systems  Review of Systems   Constitutional: Positive for malaise/fatigue. Negative for chills and fever.   HENT: Negative.    Eyes: Negative.    Respiratory: Negative.    Cardiovascular: Negative.    Gastrointestinal: Negative for abdominal pain, diarrhea and nausea.   Genitourinary: Negative.    Musculoskeletal: Negative.    Skin: Positive for itching. Negative for rash.   Neurological: Positive for weakness. Negative for dizziness, focal weakness and headaches.   Endo/Heme/Allergies: Negative.    Psychiatric/Behavioral: Negative.         Physical Exam  Temp:  [37.1 °C (98.7 °F)-37.7 °C (99.8 °F)] 37.1 °C (98.7 °F)  Pulse:  [67-80] 67  Resp:  [16-18] 16  BP: (109-136)/(48-78) 120/55  SpO2:  [93 %-98 %] 96 %    Physical Exam  Vitals signs and nursing note reviewed.   Constitutional:       General: He is not in acute distress.     Appearance: Normal appearance. He is  ill-appearing.   HENT:      Head: Normocephalic and atraumatic.      Right Ear: External ear normal.      Left Ear: External ear normal.      Nose: Nose normal.      Mouth/Throat:      Mouth: Mucous membranes are moist.      Pharynx: Oropharynx is clear. No oropharyngeal exudate.   Eyes:      General: No scleral icterus.     Extraocular Movements: Extraocular movements intact.      Conjunctiva/sclera: Conjunctivae normal.   Neck:      Musculoskeletal: Normal range of motion and neck supple. No muscular tenderness.   Cardiovascular:      Rate and Rhythm: Normal rate and regular rhythm.      Heart sounds: Normal heart sounds. No murmur.   Pulmonary:      Effort: Pulmonary effort is normal. No respiratory distress.      Breath sounds: Normal breath sounds. No wheezing or rales.   Abdominal:      General: Abdomen is flat. Bowel sounds are normal. There is no distension.      Palpations: Abdomen is soft.      Tenderness: There is no tenderness.   Musculoskeletal: Normal range of motion.         General: No swelling.   Lymphadenopathy:      Cervical: No cervical adenopathy.   Skin:     General: Skin is warm and dry.      Findings: No rash.   Neurological:      General: No focal deficit present.      Mental Status: He is alert and oriented to person, place, and time.      Motor: No weakness.   Psychiatric:         Mood and Affect: Mood normal.         Behavior: Behavior normal.         Fluids    Intake/Output Summary (Last 24 hours) at 11/17/2019 1527  Last data filed at 11/16/2019 2200  Gross per 24 hour   Intake 800 ml   Output 500 ml   Net 300 ml       Laboratory  Recent Labs     11/15/19  0236 11/16/19  0316   WBC 8.0 7.6   RBC 3.00* 2.88*   HEMOGLOBIN 9.2* 8.8*   HEMATOCRIT 27.0* 26.5*   MCV 90.0 92.0   MCH 30.7 30.6   MCHC 34.1 33.2*   RDW 45.6 46.4   PLATELETCT 173 171   MPV 9.5 9.7     Recent Labs     11/15/19  0236 11/16/19 0316 11/17/19  0218   SODIUM 141 137 138   POTASSIUM 4.1 4.2 4.1   CHLORIDE 105 107 106    CO2 26 22 27   GLUCOSE 102* 121* 110*   BUN 25* 38* 23*   CREATININE 2.75* 3.83* 2.37*   CALCIUM 8.2* 8.1* 7.6*         No results for input(s): NTPROBNP in the last 72 hours.        Assessment:  1. PAT on CKD Stage V--progression of his CKD, now new ESRD  --I had a long talk with patient and he has been reluctant to start HD in the past but he is now agreeable  --R Chest PC placed 11/12 and HD initiated   --Pt tentatively placed at [facility] TTS pending results of Quanitferon test to be sent to unit for confirmation  2. NSTEMI--medical management for now per cardiology  --Cardiac Cath done 11/16, no intervention and medical management recommended by cardiology  3. Metabolic Acidosis--resolved  4. HTN--stable  5. DM II--per primary svc  6. Renal Osteodystrophy/CKD Bone Mineral Disorder--phos elevated  --calcium acetate started   --  7. Hyperthyroidism--on methimazone  8. Anemia--secondary to CKD  --No need for IV Iron  -- PRBC transfusion 11/13  9. Secondary Hyperparathyroidism  10. SOB--resolved  11. Nausea--anti-emetics per primary svc  12. Pruritis--likely related to hyperphosphatemia  13. Hypokalemia--resolved  14. Moderate Protein-Calorie Malnutrition     Plan:  --No HD today (SUN)   --Pt tentatively accepted at [facility] on TTS schedule pending quantiferon result to be sent to them (unit is closed today, will need to verify in am)  --Stop maintenance IVF's if pt tolerating PO  --Medical management of CAD per cardiology   --Epogen with HD  --Transfuse PRN for hgb less than 7  --Continue phoslo with meals   --Continue calcitriol while admitted, but discontinue upon discharge as dose will be given and adjusted based on routine monitoring at outpt HD unit  --Consult Dr. Robbins in am as pt was to have AVF created as outpt by him.

## 2019-11-17 NOTE — PROGRESS NOTES
[facility] Services Progress Note     Hemodialysis treatment ordered today per Dr. Agee x 3 hours.   Treatment initiated at 1409, ended at 1709.      Patient tolerated tx; see paper flow sheet for details.      Net UF 0 mL.      Post tx, CVC flushed with saline then locked with heparin 1000 units/mL per designated amount in each wing then clamped and capped. Aspirate heparin prior to next CVC use.     Report given to Primary RN.

## 2019-11-18 ENCOUNTER — PATIENT OUTREACH (OUTPATIENT)
Dept: HEALTH INFORMATION MANAGEMENT | Facility: OTHER | Age: 74
End: 2019-11-18

## 2019-11-18 VITALS
TEMPERATURE: 98.5 F | BODY MASS INDEX: 26.56 KG/M2 | HEART RATE: 78 BPM | DIASTOLIC BLOOD PRESSURE: 68 MMHG | RESPIRATION RATE: 16 BRPM | OXYGEN SATURATION: 96 % | SYSTOLIC BLOOD PRESSURE: 130 MMHG | WEIGHT: 159.39 LBS | HEIGHT: 65 IN

## 2019-11-18 LAB
ANION GAP SERPL CALC-SCNC: 9 MMOL/L (ref 0–11.9)
BUN SERPL-MCNC: 31 MG/DL (ref 8–22)
CALCIUM SERPL-MCNC: 7.8 MG/DL (ref 8.5–10.5)
CHLORIDE SERPL-SCNC: 112 MMOL/L (ref 96–112)
CO2 SERPL-SCNC: 22 MMOL/L (ref 20–33)
CREAT SERPL-MCNC: 3.18 MG/DL (ref 0.5–1.4)
GLUCOSE BLD-MCNC: 100 MG/DL (ref 65–99)
GLUCOSE BLD-MCNC: 100 MG/DL (ref 65–99)
GLUCOSE BLD-MCNC: 111 MG/DL (ref 65–99)
GLUCOSE BLD-MCNC: 93 MG/DL (ref 65–99)
GLUCOSE SERPL-MCNC: 94 MG/DL (ref 65–99)
POTASSIUM SERPL-SCNC: 4.8 MMOL/L (ref 3.6–5.5)
SODIUM SERPL-SCNC: 143 MMOL/L (ref 135–145)

## 2019-11-18 PROCEDURE — 700102 HCHG RX REV CODE 250 W/ 637 OVERRIDE(OP): Performed by: STUDENT IN AN ORGANIZED HEALTH CARE EDUCATION/TRAINING PROGRAM

## 2019-11-18 PROCEDURE — 82962 GLUCOSE BLOOD TEST: CPT

## 2019-11-18 PROCEDURE — A9270 NON-COVERED ITEM OR SERVICE: HCPCS | Performed by: NURSE PRACTITIONER

## 2019-11-18 PROCEDURE — 700105 HCHG RX REV CODE 258: Performed by: NURSE PRACTITIONER

## 2019-11-18 PROCEDURE — A9270 NON-COVERED ITEM OR SERVICE: HCPCS | Performed by: STUDENT IN AN ORGANIZED HEALTH CARE EDUCATION/TRAINING PROGRAM

## 2019-11-18 PROCEDURE — 36415 COLL VENOUS BLD VENIPUNCTURE: CPT

## 2019-11-18 PROCEDURE — 99239 HOSP IP/OBS DSCHRG MGMT >30: CPT | Mod: GC | Performed by: INTERNAL MEDICINE

## 2019-11-18 PROCEDURE — 700102 HCHG RX REV CODE 250 W/ 637 OVERRIDE(OP): Performed by: NURSE PRACTITIONER

## 2019-11-18 PROCEDURE — 80048 BASIC METABOLIC PNL TOTAL CA: CPT

## 2019-11-18 PROCEDURE — A9270 NON-COVERED ITEM OR SERVICE: HCPCS | Performed by: INTERNAL MEDICINE

## 2019-11-18 PROCEDURE — 700102 HCHG RX REV CODE 250 W/ 637 OVERRIDE(OP): Performed by: INTERNAL MEDICINE

## 2019-11-18 RX ORDER — METOPROLOL TARTRATE 100 MG/1
100 TABLET ORAL 2 TIMES DAILY
Qty: 8 TAB | Refills: 0 | Status: SHIPPED | OUTPATIENT
Start: 2019-11-18 | End: 2019-11-22

## 2019-11-18 RX ORDER — CALCIUM ACETATE 667 MG/1
667 TABLET ORAL
Qty: 180 TAB | Refills: 1 | Status: ON HOLD | OUTPATIENT
Start: 2019-11-18 | End: 2019-11-24 | Stop reason: SDUPTHER

## 2019-11-18 RX ORDER — CLOPIDOGREL BISULFATE 75 MG/1
75 TABLET ORAL DAILY
Qty: 30 TAB | Refills: 1 | Status: SHIPPED | OUTPATIENT
Start: 2019-11-19 | End: 2019-11-18

## 2019-11-18 RX ORDER — PROMETHAZINE HYDROCHLORIDE 12.5 MG/1
12.5 TABLET ORAL EVERY 6 HOURS PRN
Qty: 10 TAB | Refills: 0 | Status: CANCELLED | OUTPATIENT
Start: 2019-11-18

## 2019-11-18 RX ORDER — METHIMAZOLE 5 MG/1
5 TABLET ORAL DAILY
Qty: 30 TAB | Refills: 1 | Status: SHIPPED | OUTPATIENT
Start: 2019-11-19 | End: 2019-11-18

## 2019-11-18 RX ORDER — METHIMAZOLE 5 MG/1
5 TABLET ORAL DAILY
Qty: 4 TAB | Refills: 0 | Status: SHIPPED | OUTPATIENT
Start: 2019-11-19 | End: 2019-11-22

## 2019-11-18 RX ORDER — CLOPIDOGREL BISULFATE 75 MG/1
75 TABLET ORAL DAILY
Qty: 4 TAB | Refills: 0 | Status: SHIPPED | OUTPATIENT
Start: 2019-11-19 | End: 2019-11-22

## 2019-11-18 RX ORDER — INSULIN GLARGINE 100 [IU]/ML
5 INJECTION, SOLUTION SUBCUTANEOUS EVERY EVENING
Status: DISCONTINUED | OUTPATIENT
Start: 2019-11-18 | End: 2019-11-18 | Stop reason: HOSPADM

## 2019-11-18 RX ORDER — NITROGLYCERIN 0.4 MG/1
0.4 TABLET SUBLINGUAL PRN
Qty: 10 TAB | Refills: 0 | Status: ON HOLD | OUTPATIENT
Start: 2019-11-18 | End: 2019-11-24 | Stop reason: SDUPTHER

## 2019-11-18 RX ORDER — ATORVASTATIN CALCIUM 80 MG/1
80 TABLET, FILM COATED ORAL EVERY EVENING
Qty: 30 TAB | Refills: 2 | Status: ON HOLD | OUTPATIENT
Start: 2019-11-18 | End: 2019-11-24 | Stop reason: SDUPTHER

## 2019-11-18 RX ORDER — INSULIN GLARGINE 100 [IU]/ML
8 INJECTION, SOLUTION SUBCUTANEOUS EVERY EVENING
Status: DISCONTINUED | OUTPATIENT
Start: 2019-11-18 | End: 2019-11-18

## 2019-11-18 RX ORDER — METOPROLOL TARTRATE 100 MG/1
100 TABLET ORAL 2 TIMES DAILY
Qty: 60 TAB | Refills: 1 | Status: SHIPPED | OUTPATIENT
Start: 2019-11-18 | End: 2019-11-18

## 2019-11-18 RX ORDER — CALCITRIOL 0.25 UG/1
0.25 CAPSULE, LIQUID FILLED ORAL
Qty: 30 CAP | Refills: 1 | Status: ON HOLD | OUTPATIENT
Start: 2019-11-20 | End: 2019-11-24 | Stop reason: SDUPTHER

## 2019-11-18 RX ADMIN — POTASSIUM CHLORIDE 20 MEQ: 20 TABLET, EXTENDED RELEASE ORAL at 04:54

## 2019-11-18 RX ADMIN — Medication 667 MG: at 04:54

## 2019-11-18 RX ADMIN — METOPROLOL TARTRATE 100 MG: 50 TABLET, FILM COATED ORAL at 04:54

## 2019-11-18 RX ADMIN — CALCITRIOL CAPSULES 0.25 MCG 0.25 MCG: 0.25 CAPSULE ORAL at 07:51

## 2019-11-18 RX ADMIN — APIXABAN 5 MG: 5 TABLET, FILM COATED ORAL at 17:05

## 2019-11-18 RX ADMIN — METOPROLOL TARTRATE 100 MG: 50 TABLET, FILM COATED ORAL at 17:05

## 2019-11-18 RX ADMIN — APIXABAN 5 MG: 5 TABLET, FILM COATED ORAL at 04:54

## 2019-11-18 RX ADMIN — ATORVASTATIN CALCIUM 80 MG: 80 TABLET, FILM COATED ORAL at 17:05

## 2019-11-18 RX ADMIN — Medication 667 MG: at 17:05

## 2019-11-18 RX ADMIN — CLOPIDOGREL BISULFATE 75 MG: 75 TABLET ORAL at 04:54

## 2019-11-18 RX ADMIN — METHIMAZOLE 5 MG: 5 TABLET ORAL at 04:59

## 2019-11-18 RX ADMIN — SODIUM CHLORIDE: 9 INJECTION, SOLUTION INTRAVENOUS at 00:18

## 2019-11-18 RX ADMIN — INSULIN GLARGINE 5 UNITS: 100 INJECTION, SOLUTION SUBCUTANEOUS at 17:06

## 2019-11-18 ASSESSMENT — ENCOUNTER SYMPTOMS
HEADACHES: 0
MUSCULOSKELETAL NEGATIVE: 1
PSYCHIATRIC NEGATIVE: 1
CARDIOVASCULAR NEGATIVE: 1
NAUSEA: 0
DIZZINESS: 0
FOCAL WEAKNESS: 0
WEAKNESS: 0
EYES NEGATIVE: 1
DIARRHEA: 0
FEVER: 0
ABDOMINAL PAIN: 0
CHILLS: 0
RESPIRATORY NEGATIVE: 1

## 2019-11-18 NOTE — PROGRESS NOTES
[facility] Nephrology Daily Progress Note    Author: Kulwant Hodges M.D.    Date of Service  11/18/2019    Chief Complaint  Follow up New ESRD    Interval Problem Update  This is a 73-year-old  male with past medical history significant for chronic kidney disease stage V, not yet   initiated on hemodialysis, hypertension, diabetes mellitus type 2, coronary artery disease status post stent in the past, anemia secondary to chronic kidney disease, CKD bone mineral disorder/renal osteodystrophy, hyperparathyroidism treated with temozolomide, who was admitted with complaints of chest discomfort and shortness of breath x4 days and was found to have a non-ST elevation MI and now noted to have worsening renal function as well as severe metabolic acidosis.  Patient reports that for the past 4 days, he has not been feeling well.  He has been having this vague chest discomfort as well as some shortness of breath.  He came to the ER for further evaluation last night and was found to have an elevated troponin level consistent with a non-ST elevation MI.  He has been seen by cardiology who recommends medical management at this time.  Patient does have a history of chronic kidney disease stage V, for which he follows with [facility].  His GFR has been around 9-10 mL per minute, but he has been asymptomatic as an outpatient, and so, he has not yet needed to initiate dialysis.  He has a history of a failed AV fistula and he was referred for vascular surgery to have a new AV fistula created and he is in the process of this.  The patient reports that at this time, he does not have any shortness of breath and his chest pain has resolved.  His labs show an elevated BUN of 129, creatinine is 6.53 and a GFR of 8 mL per minute and he has a significant metabolic acidosis with a serum bicarbonate level of 7 and this was a level that I was at last night on admission as well.  He only recently was started on a  bicarbonate drip with one-half normal saline with 75 mEq of sodium bicarbonate at 75 mL an hour by the primary service.  His troponin level has peaked at 816 and is now trending down.    DAILY NEPHROLOGY SUMMARY  11/13/19--No events, tunneled HD catheter placed yest by IR and HD initiated, BP low during HD and no fluid removed, pt feels nauseous and weak today, BP stable, acidosis improved, serum K low, BUN improved with HD, getting PRBC transfusion today  11/14/19--Hd treatment #3 today, he reports occasional nausea, and sob.  He reports tolerating HD well and believes it is helping with SOB.    11/15/19--No HD today, Plan for cardiac cath tomorrow. Pt sitting up at bedside, he is feeling well today.  Tolerated HD well yesterday.  Denies pain, n/v, sob.  He is NPO for cardiac cath.    11/16/19--No events, cardiac cath done today and recommendation for medical management and no stents placed, BP stable, seen on dialysis this afternoon  11/17/19--No events, BP stable, tolerated HD yesterday, no new complaints, remains on maintenance IVF's  11/18/19- Pt reports increased mobility from prior, no chest pain    Review of Systems  Review of Systems   Constitutional: Positive for malaise/fatigue. Negative for chills and fever.   HENT: Negative.    Eyes: Negative.    Respiratory: Negative.    Cardiovascular: Negative.    Gastrointestinal: Negative for abdominal pain, diarrhea and nausea.   Genitourinary: Negative.    Musculoskeletal: Negative.    Skin: Positive for itching. Negative for rash.   Neurological: Negative for dizziness, focal weakness, weakness and headaches.   Endo/Heme/Allergies: Negative.    Psychiatric/Behavioral: Negative.         Physical Exam  Temp:  [36.6 °C (97.9 °F)-37.1 °C (98.7 °F)] 36.7 °C (98.1 °F)  Pulse:  [66-77] 68  Resp:  [16-18] 18  BP: (120-141)/(55-68) 141/64  SpO2:  [95 %-99 %] 96 %    Physical Exam  Vitals signs and nursing note reviewed.   Constitutional:       General: He is not in acute  distress.     Appearance: Normal appearance. He is ill-appearing.   HENT:      Head: Normocephalic and atraumatic.      Right Ear: External ear normal.      Left Ear: External ear normal.      Nose: Nose normal.      Mouth/Throat:      Mouth: Mucous membranes are moist.      Pharynx: Oropharynx is clear. No oropharyngeal exudate.   Eyes:      General: No scleral icterus.     Extraocular Movements: Extraocular movements intact.      Conjunctiva/sclera: Conjunctivae normal.   Neck:      Musculoskeletal: Normal range of motion and neck supple. No muscular tenderness.      Comments: RI tunnel cath  Cardiovascular:      Rate and Rhythm: Normal rate and regular rhythm.      Heart sounds: Normal heart sounds. No murmur.   Pulmonary:      Effort: Pulmonary effort is normal. No respiratory distress.      Breath sounds: Normal breath sounds. No wheezing or rales.   Abdominal:      General: Abdomen is flat. Bowel sounds are normal. There is no distension.      Palpations: Abdomen is soft.      Tenderness: There is no tenderness.   Musculoskeletal: Normal range of motion.         General: No swelling.   Lymphadenopathy:      Cervical: No cervical adenopathy.   Skin:     General: Skin is warm and dry.      Findings: No rash.      Comments: echymosis on arm   Neurological:      General: No focal deficit present.      Mental Status: He is alert and oriented to person, place, and time.      Motor: No weakness.   Psychiatric:         Mood and Affect: Mood normal.         Behavior: Behavior normal.         Fluids  No intake or output data in the 24 hours ending 11/18/19 0827    Laboratory  Recent Labs     11/16/19 0316   WBC 7.6   RBC 2.88*   HEMOGLOBIN 8.8*   HEMATOCRIT 26.5*   MCV 92.0   MCH 30.6   MCHC 33.2*   RDW 46.4   PLATELETCT 171   MPV 9.7     Recent Labs     11/16/19  0316 11/17/19  0218 11/18/19  0217   SODIUM 137 138 143   POTASSIUM 4.2 4.1 4.8   CHLORIDE 107 106 112   CO2 22 27 22   GLUCOSE 121* 110* 94   BUN 38* 23*  31*   CREATININE 3.83* 2.37* 3.18*   CALCIUM 8.1* 7.6* 7.8*         No results for input(s): NTPROBNP in the last 72 hours.        Assessment:  1. PAT on CKD Stage V--progression of his CKD, now new ESRD  --I had a long talk with patient and he has been reluctant to start HD in the past but he is now agreeable  --R Chest PC placed 11/12 and HD initiated   --Pt tentatively placed at [facility] TTS pending results of Quanitferon test to be sent to unit for confirmation  2. NSTEMI--medical management for now per cardiology  --Cardiac Cath done 11/16, no intervention and medical management recommended by cardiology  4. HTN--stable  5. DM II--per primary svc  6. Renal Osteodystrophy/CKD Bone Mineral Disorder--phos elevated  --calcium acetate started   --  7. Hyperthyroidism--on methimazone  8. Anemia--secondary to CKD  --No need for IV Iron  -- PRBC transfusion 11/13  9. Secondary Hyperparathyroidism  12. Pruritis--likely related to hyperphosphatemia  14. Moderate Protein-Calorie Malnutrition     Plan:  --No HD today Monday  --Pt tentatively accepted at [facility] on TTS schedule pending quantiferon result to be sent to them (unit is closed today, will need to verify in am)  --Stop maintenance IVF  --Medical management of CAD per cardiology   --Epogen with HD  --Transfuse PRN for hgb less than 7  --Continue phoslo with meals   --Continue calcitriol while admitted, but discontinue upon discharge as dose will be given and adjusted based on routine monitoring at outpt HD unit  --discussed case with vascular surgeon will proceed as outpatient

## 2019-11-18 NOTE — CARE PLAN
Problem: Safety  Goal: Will remain free from injury  Outcome: PROGRESSING AS EXPECTED  Goal: Will remain free from falls  Outcome: PROGRESSING AS EXPECTED     Problem: Bowel/Gastric:  Goal: Normal bowel function is maintained or improved  Outcome: PROGRESSING AS EXPECTED  Goal: Will not experience complications related to bowel motility  Outcome: PROGRESSING AS EXPECTED     Problem: Respiratory:  Goal: Respiratory status will improve  Outcome: PROGRESSING AS EXPECTED

## 2019-11-18 NOTE — PROGRESS NOTES
Internal Medicine Interval Note  Note Author: Jenny Holland M.D.     Name Luis Sepulveda     1945   Age/Sex 73 y.o. male   MRN [id number]   Code Status DNAR/ Intubation ok      After 5PM or if no immediate response to page, please call for cross-coverage  Attending/Team:  /[email or web address]  See Patient List for primary contact information  Call [phone] to page    1st Call - Day Intern (R1):    2nd Call - Day Sr. Resident (R2/R3):            Reason for interval visit  (Principal Problem)    NSTEMI , medically managed    Interval Problem Daily Status Update  (24 hours, problem oriented, brief subjective history, new lab/imaging data pertinent to that problem)     Mr Sepulveda is a 72yo male who came with the complaint of chest pain ,troponin levels( 829) and EKG findings of ST depression in anterolateral leads is diagnosed with NSTEMI     - No acute events overnight  - No acute complains,  No chest pain / SOB or dizziness and nausea/vomiting.  - Cardiac cath done 19 - patent coronary stents proximal to distal, left anterior descending artery. No stents were done. Cardiology (Dr. Osbonr) recommended  maximal medical therapy.  - TB quant neg, has outpatient HD chair set with [facility].   -CHADSVASc score of 4 , continue with Eliquis  5 mg BID ,Nephrology, Dr. Agee is updated.  - AVF is planned outpatient.      - Continue Plavix but discontinued Aspirin.      ROS   Constitutional: Negative for chills, fever and weight loss.   HENT: Negative for ear pain, hearing loss and tinnitus.    Eyes: Negative for blurred vision, double vision and photophobia.   Respiratory:  Negative for hemoptysis, rales and wheezing.    Cardiovascular: Denies chest pain . Negative for palpitations, orthopnea, claudication and leg swelling.   Gastrointestinal: Negative for abdominal pain, constipation, diarrhea, heartburn   Genitourinary: Negative for dysuria and urgency.    Musculoskeletal: Negative for myalgias.   Skin: Negative for rash.   Neurological: positive for mild  dizziness and nausea. Negative for tingling, sensory change, speech change and headaches.       Disposition/Barriers to discharge  None   Confirmation from dialysis center that TB results received . Patient will be discharged     Consultants/Specialty  Cardiology Dr.Benjamin Kalyani MOTA  Nephrology Dr.Soni YUKI Agee  PCP: Patito Edgar M.D.      Quality Measures  Quality-Core Measures   Reviewed items::  EKG reviewed, Labs reviewed, Medications reviewed and Radiology images reviewed  Tomas catheter::  No Tomas  DVT prophylaxis - mechanical:  SCDs          Physical Exam       Vitals:    11/18/19 0000 11/18/19 0516 11/18/19 0735 11/18/19 1150   BP: 125/61 141/64 123/65 127/68   Pulse: 69 68 65 67   Resp: 18 18 17 17   Temp: 36.6 °C (97.9 °F) 36.7 °C (98.1 °F) 37.3 °C (99.1 °F) 36.7 °C (98.1 °F)   TempSrc: Temporal Temporal Temporal Temporal   SpO2: 95% 96% 96% 98%   Weight:       Height:         Body mass index is 26.52 kg/m². Weight: 72.3 kg (159 lb 6.3 oz)  Oxygen Therapy:  Pulse Oximetry: 98 %, O2 (LPM): 0, O2 Delivery: None (Room Air)    Physical Exam    Constitutional: He is oriented to person, place, and time and well-developed, well-nourished, and in no distress. No distress.   HENT:   Head: Normocephalic and atraumatic.   Mouth/Throat: No oropharyngeal exudate.   Eyes: Pupils are equal, round, and reactive to light. EOM are normal.   Neck: Normal range of motion. Neck supple.   Cardiovascular: Normal rate, regular rhythm and intact distal pulses.   Pulmonary/Chest: Effort normal and breath sounds normal. No respiratory distress. He has no wheezes. He has no rales.   Abdominal: Soft. Bowel sounds are normal. He exhibits no distension. There is no tenderness. There is no rebound.   Musculoskeletal: Normal range of motion.         General: No edema.   Neurological: He is alert and oriented to person,  place, and time. No cranial nerve deficit. Gait normal. Coordination normal.   Skin: Skin is warm and dry. No erythema.       Assessment/Plan     * NSTEMI (non-ST elevated myocardial infarction) ([facility])  Assessment & Plan  Cath done on 11/16 - patent coronary stents proximal to distal, left anterior descending artery. No stents were done. Cardiology (Dr. Osborn) recommended F/U with EKG and maximal medical therapy.  -Continue nitroglycerin prn for chest pain  -Continue atorvastatin and Plavix   -Discontinued aspirin  -Patient is started on Eliquis twice daily 5 mg  -patient is educated and understands that he has to stop Eliquis at least 2 days prior to AVF procedure   Follow up with outpatient  Cardiology         End stage renal disease on dialysis ([facility])- (present on admission)  Assessment & Plan  Patient has history of chronic kidney disease stage V - ESRD  Patient started on HD on 11/12/19 with tunnel cath in place  Has outpatient HD chair set with [employer] [facility] - for TTS but yet to be confirmed  TB quant neg   Renal dose all meds     Plan:  We will discharge the patient as outpatient dialysis is confirmed   Follow up with Nephrology as outpatient            Paroxysmal A-fib ([facility])  Assessment & Plan  Rate controlled with metoprolol, not on anti coag  CHADSVasc 4 - will need anticoag.   Cardiology suggested Eliquis   AVF is planned as outpatient     Plan:   start on Eliquis 5 mg twice daily  Stop aspirin  Continue Plavix    Hyperthyroidism- (present on admission)  Assessment & Plan  Asymptomatic   US thyroid showed benign thyroid nodules-(benign bx 8/23/18)  Patient supposed to take methimazole 5 mg daily as per the PCP notes but patient is not compliant .  11/13/19 TSH low = <0.005 and high free T4 of 4.16   Continue with methimazole 5 mg daily  outpatient follow up with primary care       Secondary hyperparathyroidism of renal origin ([facility])  Assessment & Plan  2/2 ESRD    Plan:  Calcitriol added      Diabetes  mellitus type 2, controlled, with complications (HCC)- (present on admission)  Assessment & Plan  Patient has history of type 2 diabetes mellitus  At home he is on insulin Lantus 36 units every evening, not in any short-acting insulin.  Hemoglobin A1c 5.3 in September 2019  Currently since he started on dialysis , his Blood glucose readings has been in the lower side , dropping to 60s with lantus     Continue  insulin with reduced dose of Lantus to 5 units   Discontinue the SSI  Hypoglycemia protocol  Continue to monitor  Patient is informed that he needs to check  morning fasting blood sugars regularly at home   Follow up with the primary care for Insulin dosage and blood glucose management     Anemia of chronic disease- (present on admission)  Assessment & Plan  Stable   Anemia--secondary to CKD  Normocytic anemia   Hb dropped to 6.8 on 11/12/19 and received 1 unit of blood transfused   Currently stable   CTM  Blood transfusion if the Hb < 7   Nephrology - epoetin with HD      Gastroesophageal reflux disease- (present on admission)  Assessment & Plan  Patient has history of GERD.  He is on pantoprazole at home        Essential hypertension- (present on admission)  Assessment & Plan  History of hypertension  On admission blood pressure 114/60  Holding home medication , continue to monitor, restart if BP is elevated

## 2019-11-18 NOTE — PROGRESS NOTES
Assumed care of patient.  No changes overnight. Awaiting discharge today after dialysis clinic approval of schedule.  Will mention to MDs the possible need of dc'ing Lantus as patient is not requiring it and was hypoglycemic in the AMs when he was.

## 2019-11-18 NOTE — PROGRESS NOTES
Assumed care of patient.  Pt A,Ox4, no complaints or signs of distress.  Safety precautions in place with bed in lowest, locked position and call bell in reach.

## 2019-11-18 NOTE — DISCHARGE SUMMARY
Internal Medicine Discharge Summary  Note Author: Tosha Maria M.D.     Name Luis Sepulveda 1945   Age/Sex 73 y.o. male   MRN [id number]     Admit Date:  2019       Discharge Date:   19    Service:   [facility] Internal Medicine Purple Team  Attending Physician(s):           Senior Resident(s):     Lei Resident(s):     PCP: Patito Edgar M.D.    Primary Diagnosis:   NSTEMI (non-ST elevated myocardial infarction) (HCC)    Secondary Diagnoses:                Principal Problem:    NSTEMI (non-ST elevated myocardial infarction) (HCC) POA: Unknown  Active Problems:    End stage renal disease on dialysis (HCC) POA: Yes    Hyperthyroidism POA: Yes    Paroxysmal A-fib (HCC) POA: Unknown    Essential hypertension POA: Yes    Gastroesophageal reflux disease POA: Yes    Anemia of chronic disease POA: Yes    Diabetes mellitus type 2, controlled, with complications (HCC) POA: Yes    Secondary hyperparathyroidism of renal origin (HCC) POA: Clinically Undetermined  Resolved Problems:    * No resolved hospital problems. *    Hospital Summary (Brief Narrative):       The patient is a 73-year-old  male with past medical history significant for chronic kidney disease stage V, not yet initiated on hemodialysis, hypertension, diabetes mellitus type 2, coronary artery disease status post stent in the past, anemia secondary to chronic kidney disease, CKD bone mineral disorder/renal osteodystrophy, hyperparathyroidism treated with temozolomide, who was admitted with complaints of chest discomfort and shortness of breath x4 days and was found to have a non-ST elevation MI Cath done on  - patent coronary stents proximal to distal, left anterior descending artery. No stents were done. Cardiology (Dr. Osborn) recommended F/U with EKG and maximal medical therapy. Consulted Nephrology for ESRD and patient was started on hemodialysis, discharged with  outpatient dialysis set up.    Patient /Hospital Summary (Details -- Problem Oriented) :          End stage renal disease on dialysis (HCC)  Assessment & Plan  Patient has history of chronic kidney disease stage V progressed to ESRD  Patient started on HD on 11/12/19 with tunnel cath in place  Has outpatient HD chair set with Katrina turner neg      * NSTEMI (non-ST elevated myocardial infarction) ([facility])  Assessment & Plan  Cath done on 11/16 - patent coronary stents proximal to distal, left anterior descending artery. No stents were done. Cardiology (Dr. Osborn) recommended F/U with EKG and maximal medical therapy.  -Continue nitroglycerin prn for chest pain  -Continue atorvastatin and Plavix   -Discontinued aspirin  -Patient is started on Eliquis twice daily 5 mg  Patient is educated and understands that he has to stop Eliquis at least 2 days prior to AVF procedure     Paroxysmal A-fib ([facility])  Assessment & Plan  Rate controlled with metoprolol, not on anti coag  CHADSVasc 4 - will need anticoag.   Cardiology suggested Eliquis   AVF is planned as outpatient     Plan:  Continue Eliquis 5 mg twice daily  Continue Plavix    Hyperthyroidism  Assessment & Plan  Asymptomatic   US thyroid showed benign thyroid nodules-(benign bx 8/23/18)  Patient supposed to take methimazole 5 mg daily as per the PCP notes but patient is not compliant .  11/13/19 TSH low = <0.005 and high free T4 of 4.16   Continue with methimazole 5 mg daily  outpatient follow up with primary care       Secondary hyperparathyroidism of renal origin ([facility])  Assessment & Plan  2/2 ESRD    Plan:  Calcitriol to be continued at the time of discharge      Continue phoslo with meals     Diabetes mellitus type 2, controlled, with complications ([facility])  Assessment & Plan  Patient has history of type 2 diabetes mellitus  At home he is on insulin Lantus 36 units every evening, not on any short-acting insulin.  Hemoglobin A1c 5.3 in September 2019  Continue  insulin with reduced dose of Lantus to 5 units     Anemia of chronic disease  Assessment & Plan  Stable   Anemia--secondary to CKD  Normocytic anemia   Hb dropped to 6.8 on 11/12/19 and received 1 unit of blood transfusion.  Currently stable   Nephrology - epoetin with HD    Gastroesophageal reflux disease  Assessment & Plan  Patient has history of GERD.  Continue pantoprazole at home    Essential hypertension  Assessment & Plan  History of hypertension  On admission blood pressure 114/60  On lopressor twice Daily 100 mg     Consultants:     Nephrology     Procedures:        A.  Left heart catheterization.  B.  Selective coronary angiography.  D.  Right femoral artery approach.  E.RIJ TUNNELLED HD CATH    Imaging/ Testing:      NM-CARDIAC STRESS TEST   Final Result      IR-SAUL,GROMIHIR PLACEMENT >5   Final Result      1. ULTRASOUND AND FLUOROSCOPIC GUIDED PLACEMENT OF A Right INTERNAL JUGULAR 14.5 [place] HemoSplit TUNNELED DIALYSIS CATHETER.      2. THE HEMODIALYSIS CATHETER MAY BE USED IMMEDIATELY AS CLINICALLY INDICATED. FLUSHES PER PROTOCOL.         EC-ECHOCARDIOGRAM COMPLETE W/O CONT   Final Result      CL-LEFT HEART CATHETERIZATION WITH POSSIBLE INTERVENTION    (Results Pending)     Discharge Medications:         Medication Reconciliation: Completed       Medication List      START taking these medications      Instructions   apixaban 5mg Tabs  Commonly known as:  ELIQUIS   Doctor's comments:  For paroxysmal AF  Take 1 Tab by mouth 2 Times a Day for 4 days.  Dose:  5 mg     atorvastatin 80 MG tablet  Commonly known as:  LIPITOR   Take 1 Tab by mouth every evening.  Dose:  80 mg     calcitRIOL 0.25 MCG Caps  Start taking on:  November 20, 2019  Commonly known as:  ROCALTROL   Take 1 Cap by mouth every Monday, Wednesday, and Friday.  Dose:  0.25 mcg     calcium acetate 667 MG Tabs tablet  Commonly known as:  PHOS-LO   Take 1 Tab by mouth 3 times a day before meals.  Dose:  667 mg     clopidogrel 75 MG  Tabs  Start taking on:  November 19, 2019  Commonly known as:  PLAVIX   Take 1 Tab by mouth every day for 4 days.  Dose:  75 mg     methimazole 5 MG Tabs  Start taking on:  November 19, 2019  Commonly known as:  TAPAZOLE   Take 1 Tab by mouth every day for 4 days.  Dose:  5 mg     metoprolol 100 MG Tabs  Commonly known as:  LOPRESSOR   Take 1 Tab by mouth 2 Times a Day for 4 days.  Dose:  100 mg     nitroglycerin 0.4 MG Subl  Commonly known as:  NITROSTAT   Place 1 Tab under tongue as needed for Chest Pain (donot take if Blood pressure is less than 100/60 mm of Hg) for up to 10 doses.  Dose:  0.4 mg        CHANGE how you take these medications      Instructions   insulin glargine 100 UNIT/ML Sopn injection  What changed:    · how much to take  · how to take this  Commonly known as:  LANTUS SOLOSTAR   Inject 5 Units as instructed every evening for 60 days.  Dose:  5 Units        CONTINUE taking these medications      Instructions   acetaminophen 500 MG Tabs  Commonly known as:  TYLENOL   Take 500-1,000 mg by mouth every 6 hours as needed for Moderate Pain.  Dose:  500-1,000 mg     furosemide 40 MG Tabs  Commonly known as:  LASIX   Take 40 mg by mouth 1 time daily as needed. Indications: Edema  Dose:  40 mg     pantoprazole 40 MG Tbec  Commonly known as:  PROTONIX   Take 40 mg by mouth 1 time daily as needed.  Dose:  40 mg        STOP taking these medications    aspirin 81 MG Chew chewable tablet  Commonly known as:  ASA     carvedilol 6.25 MG Tabs  Commonly known as:  COREG     diphenhydrAMINE 25 MG Tabs  Commonly known as:  BENADRYL     EFFIENT 10 MG Tabs  Generic drug:  prasugrel     sodium bicarbonate 650 MG Tabs  Commonly known as:  SODIUM BICARBONATE          Can use .DISCHARGEMEDSLIST if going to another facility       Disposition:   Home with outpatient dialysis set up     Activity:   As tolerated     Instructions:         The patient was instructed to return to the ER in the event of worsening symptoms. I  have counseled the patient on the importance of compliance and the patient has agreed to proceed with all medical recommendations and follow up plan indicated above.   The patient understands that all medications come with benefits and risks. Risks may include permanent injury or death and these risks can be minimized with close reassessment and monitoring.        Primary Care Provider:    Patito Edgar M.D.  Discharge summary faxed to primary care provider:  Deferred  Copy of discharge summary given to the patient: Deferred    Follow up appointment details :      Patito Edgar M.D.  [address]  [address]  [place] [address]  [phone]    Call  to make an appointment for hospital follow up at the time of discharge.    Dec 02, 2019 10:45 AM [facility]  Hospital Follow up with RENATA Callahan  [facility] (--) [address], [address]  [place] [id number] [phone]       Pending Studies:        none    Time spent on discharge day patient visit, preparing discharge paperwork and arranging for patient follow up.    Discharge Time (Minutes) :    55 minutes   Hospital Course Type:  Inpatient Stay >2 midnights    Condition on Discharge    ______________________________________________________________________    Interval history/exam for day of discharge:     - No acute events overnight  -  No acute complains,  No chest pain / SOB or dizziness and nausea/vomiting.  -  Cardiac cath done 11/16/19 - patent coronary stents proximal to distal, left anterior descending artery. No stents were    done. Cardiology (Dr. Osborn) recommended  maximal medical therapy.  -  TB quant negative.     Physical Exam     Constitutional: He is oriented to person, place, and time and well-developed, well-nourished, and in no distress. No distress.   HENT:   Head: Normocephalic and atraumatic.   Mouth/Throat: No oropharyngeal exudate.   Eyes: Pupils are equal, round, and  reactive to light. EOM are normal.   Neck: Normal range of motion. Neck supple.   Cardiovascular: Normal rate, regular rhythm and intact distal pulses.   Pulmonary/Chest: Effort normal and breath sounds normal. No respiratory distress. He has no wheezes. He has no rales.   Abdominal: Soft. Bowel sounds are normal. He exhibits no distension. There is no tenderness. There is no rebound.   Musculoskeletal: Normal range of motion.         General: No edema.   Neurological: He is alert and oriented to person, place, and time. No cranial nerve deficit. Gait normal. Coordination normal.   Skin: Skin is warm and dry. No erythema.     Most Recent Labs:    Lab Results   Component Value Date/Time    WBC 7.6 11/16/2019 03:16 AM    RBC 2.88 (L) 11/16/2019 03:16 AM    HEMOGLOBIN 8.8 (L) 11/16/2019 03:16 AM    HEMATOCRIT 26.5 (L) 11/16/2019 03:16 AM    MCV 92.0 11/16/2019 03:16 AM    MCH 30.6 11/16/2019 03:16 AM    MCHC 33.2 (L) 11/16/2019 03:16 AM    MPV 9.7 11/16/2019 03:16 AM    NEUTSPOLYS 67.50 11/16/2019 03:16 AM    LYMPHOCYTES 14.90 (L) 11/16/2019 03:16 AM    MONOCYTES 15.50 (H) 11/16/2019 03:16 AM    EOSINOPHILS 0.30 11/16/2019 03:16 AM    BASOPHILS 0.40 11/16/2019 03:16 AM      Lab Results   Component Value Date/Time    SODIUM 143 11/18/2019 02:17 AM    POTASSIUM 4.8 11/18/2019 02:17 AM    CHLORIDE 112 11/18/2019 02:17 AM    CO2 22 11/18/2019 02:17 AM    GLUCOSE 94 11/18/2019 02:17 AM    BUN 31 (H) 11/18/2019 02:17 AM    CREATININE 3.18 (H) 11/18/2019 02:17 AM      Lab Results   Component Value Date/Time    ALTSGPT 15 11/15/2019 02:36 AM    ASTSGOT 22 11/15/2019 02:36 AM    ALKPHOSPHAT 149 (H) 11/15/2019 02:36 AM    TBILIRUBIN 0.7 11/15/2019 02:36 AM    ALBUMIN 3.3 11/15/2019 02:36 AM    GLOBULIN 2.4 11/15/2019 02:36 AM    INR 1.44 (H) 11/12/2019 11:53 AM     Lab Results   Component Value Date/Time    PROTHROMBTM 17.9 (H) 11/12/2019 11:53 AM    INR 1.44 (H) 11/12/2019 11:53 AM

## 2019-11-18 NOTE — CARE PLAN
Problem: Safety  Goal: Will remain free from injury  Outcome: PROGRESSING AS EXPECTED  Goal: Will remain free from falls  Outcome: PROGRESSING AS EXPECTED     Problem: Respiratory:  Goal: Respiratory status will improve  Outcome: PROGRESSING AS EXPECTED

## 2019-11-18 NOTE — DISCHARGE PLANNING
Anticipated Discharge Disposition:   · Home with dialysis  Action:   · LSW received message that dialysis center is needing TB results. LSW faxed Qualntifer[employer] Gold Plus TB to dialysis center at [phone]. Requested call back to confirm facility received.     Barriers to Discharge:   · Confirmation from dialysis center that TB results received and pt can start dialysis tomorrow.     Plan:   · Care coordination will continue to follow up and provide assistance as needed

## 2019-11-19 NOTE — DISCHARGE INSTRUCTIONS
Discharge Instructions    Discharged to home by car with relative. Discharged via wheelchair, hospital escort: Yes.  Special equipment needed: Not Applicable    Be sure to schedule a follow-up appointment with your primary care doctor or any specialists as instructed.     Discharge Plan:   Diet Plan: Discussed  Activity Level: Discussed  Confirmed Follow up Appointment: Patient to Call and Schedule Appointment  Confirmed Symptoms Management: Discussed  Medication Reconciliation Updated: Yes  Influenza Vaccine Indication: Patient Refuses    I understand that a diet low in cholesterol, fat, and sodium is recommended for good health. Unless I have been given specific instructions below for another diet, I accept this instruction as my diet prescription.   Other diet: Dialysis    Special Instructions: Diagnosis:  Acute Coronary Syndrome (ACS) is a diagnosis that encompasses cardiac-related chest pain and heart attack. ACS occurs when the blood flow to the heart muscle is severely reduced or cut off completely due to a slow process called atherosclerosis.  Atherosclerosis is a disease in which the coronary arteries become narrow from a buildup of fat, cholesterol, and other substances that combine to form plaque. If the plaque breaks, a blood clot will form and block the blood flow to the heart muscle. This lack of blood flow can cause damage or death to the heart muscle which is called a heart attack or Myocardial Infarction (MI). There are two different types of MIs:  ST Elevation Myocardial Infarction or STEMI (the most severe type of heart attack) and Non-ST Elevation Myocardial Infarction or NSTEMI.    Treatment Plan:  · Cardiac Diet  - Low fat, low salt, low cholesterol   · Cardiac Rehab  - Your doctor has ordered you a referral to [facility].  Call [phone] to schedule an appointment.  · Attend my follow-up appointment with my Cardiologist.  · Take my medications as prescribed by my doctor  · Exercise  daily  · Reduce stress and Control my diabetes    Medications:  Certain medications are used to treat ACS.  Remember to always take medications as prescribed and never stop talking medications unless told by your doctor.    You have been prescribed the following medicatons:    Anti-platelet/blood thinner - Your Anti-platelet/Blood thinning medication is called Apixaban, and is used in combination with aspirin to prevent clots from forming in your heart and/or around your stent.  Your doctor will determine how long you need to be on this medicine.  Beta-Blocker - Beta-Blocker Metoprolol is used to lower blood pressure and heart rate, and/or helps your heart heal after a heart attack.  Statin - Statin Atrovastatin is used to lower cholesterol.    · Is patient discharged on Warfarin / Coumadin?   No     Depression / Suicide Risk    As you are discharged from this [facility] facility, it is important to learn how to keep safe from harming yourself.    Recognize the warning signs:  · Abrupt changes in personality, positive or negative- including increase in energy   · Giving away possessions  · Change in eating patterns- significant weight changes-  positive or negative  · Change in sleeping patterns- unable to sleep or sleeping all the time   · Unwillingness or inability to communicate  · Depression  · Unusual sadness, discouragement and loneliness  · Talk of wanting to die  · Neglect of personal appearance   · Rebelliousness- reckless behavior  · Withdrawal from people/activities they love  · Confusion- inability to concentrate     If you or a loved one observes any of these behaviors or has concerns about self-harm, here's what you can do:  · Talk about it- your feelings and reasons for harming yourself  · Remove any means that you might use to hurt yourself (examples: pills, rope, extension cords, firearm)  · Get professional help from the community (Mental Health, Substance Abuse, psychological counseling)  · Do  not be alone:Call your Safe Contact- someone whom you trust who will be there for you.  · Call your local CRISIS HOTLINE 787-2350 or 256-420-3403  · Call your local Children's Mobile Crisis Response Team [place] (298) 108-4779 or www.[employer]  · Call the toll free National Suicide Prevention Hotlines   · National Suicide Prevention Lifeline 539-057-PKNI (9008)  · National Coub Line Network 800-SUICIDE (306-1922)

## 2019-11-19 NOTE — DISCHARGE PLANNING
Anticipated Discharge Disposition:   · Home with dialysis     Action:   · Follow up to dialysis center to determine if TB results were received and if pt is able to start dialysis tomorrow. Representative Javan reported pt's results were received and facility is to start dialysis with pt tomorrow at his scheduled chair time. She reported pt needs to be there at 1215.   · Provider, Dr. Holland updated via TT.   · Provider, Dr. Holland requested follow up regarding pt's coverage for Eliquis. Medication sent into pt's pharmacy. LSW placed outreach call to pharmacy [phone]. Representative reported medication is covered at $0 copayment but reported due to pharmacy being mail order it would likely take a few days to be delivered to pt's home.   · Provider, Dr. Holland updated via TT and LSW recommended provider also send pt with hard scripts to  medications at a local pharmacy while pending delivery from his mail order pharmacy.     Barriers to Discharge:   · None     Plan:   · Care coordination will continue to follow up and provide assistance as needed with discharge plans/barriers.

## 2019-11-22 ENCOUNTER — APPOINTMENT (OUTPATIENT)
Dept: MEDICAL GROUP | Facility: MEDICAL CENTER | Age: 74
End: 2019-11-22
Payer: MEDICARE

## 2019-11-22 ENCOUNTER — APPOINTMENT (OUTPATIENT)
Dept: CARDIOLOGY | Facility: MEDICAL CENTER | Age: 74
DRG: 070 | End: 2019-11-22
Attending: HOSPITALIST
Payer: MEDICARE

## 2019-11-22 ENCOUNTER — APPOINTMENT (OUTPATIENT)
Dept: RADIOLOGY | Facility: MEDICAL CENTER | Age: 74
DRG: 070 | End: 2019-11-22
Attending: EMERGENCY MEDICINE
Payer: MEDICARE

## 2019-11-22 ENCOUNTER — HOSPITAL ENCOUNTER (INPATIENT)
Facility: MEDICAL CENTER | Age: 74
LOS: 2 days | DRG: 070 | End: 2019-11-24
Attending: EMERGENCY MEDICINE | Admitting: HOSPITALIST
Payer: MEDICARE

## 2019-11-22 ENCOUNTER — APPOINTMENT (OUTPATIENT)
Dept: RADIOLOGY | Facility: MEDICAL CENTER | Age: 74
DRG: 070 | End: 2019-11-22
Attending: HOSPITALIST
Payer: MEDICARE

## 2019-11-22 PROBLEM — R47.1 DYSARTHRIA: Status: ACTIVE | Noted: 2019-11-22

## 2019-11-22 LAB
ABO GROUP BLD: NORMAL
ALBUMIN SERPL BCP-MCNC: 3.1 G/DL (ref 3.2–4.9)
ALBUMIN/GLOB SERPL: 1.3 G/DL
ALP SERPL-CCNC: 126 U/L (ref 30–99)
ALT SERPL-CCNC: 14 U/L (ref 2–50)
AMMONIA PLAS-SCNC: 22 UMOL/L (ref 11–45)
ANION GAP SERPL CALC-SCNC: 9 MMOL/L (ref 0–11.9)
APTT PPP: 33.5 SEC (ref 24.7–36)
AST SERPL-CCNC: 15 U/L (ref 12–45)
BASOPHILS # BLD AUTO: 0.4 % (ref 0–1.8)
BASOPHILS # BLD: 0.03 K/UL (ref 0–0.12)
BILIRUB SERPL-MCNC: 0.6 MG/DL (ref 0.1–1.5)
BLD GP AB SCN SERPL QL: NORMAL
BUN SERPL-MCNC: 17 MG/DL (ref 8–22)
CALCIUM SERPL-MCNC: 8 MG/DL (ref 8.5–10.5)
CHLORIDE SERPL-SCNC: 101 MMOL/L (ref 96–112)
CO2 SERPL-SCNC: 29 MMOL/L (ref 20–33)
CREAT SERPL-MCNC: 2.91 MG/DL (ref 0.5–1.4)
EOSINOPHIL # BLD AUTO: 0.03 K/UL (ref 0–0.51)
EOSINOPHIL NFR BLD: 0.4 % (ref 0–6.9)
ERYTHROCYTE [DISTWIDTH] IN BLOOD BY AUTOMATED COUNT: 44.3 FL (ref 35.9–50)
EST. AVERAGE GLUCOSE BLD GHB EST-MCNC: 105 MG/DL
GLOBULIN SER CALC-MCNC: 2.4 G/DL (ref 1.9–3.5)
GLUCOSE BLD-MCNC: 120 MG/DL (ref 65–99)
GLUCOSE BLD-MCNC: 140 MG/DL (ref 65–99)
GLUCOSE SERPL-MCNC: 105 MG/DL (ref 65–99)
HBA1C MFR BLD: 5.3 % (ref 0–5.6)
HCT VFR BLD AUTO: 24.3 % (ref 42–52)
HGB BLD-MCNC: 8 G/DL (ref 14–18)
IMM GRANULOCYTES # BLD AUTO: 0.05 K/UL (ref 0–0.11)
IMM GRANULOCYTES NFR BLD AUTO: 0.6 % (ref 0–0.9)
INR PPP: 1.47 (ref 0.87–1.13)
LV EJECT FRACT  99904: 45
LV EJECT FRACT MOD 2C 99903: 63.26
LV EJECT FRACT MOD 4C 99902: 48.36
LV EJECT FRACT MOD BP 99901: 56.52
LYMPHOCYTES # BLD AUTO: 1.23 K/UL (ref 1–4.8)
LYMPHOCYTES NFR BLD: 15.8 % (ref 22–41)
MCH RBC QN AUTO: 30.7 PG (ref 27–33)
MCHC RBC AUTO-ENTMCNC: 32.9 G/DL (ref 33.7–35.3)
MCV RBC AUTO: 93.1 FL (ref 81.4–97.8)
MONOCYTES # BLD AUTO: 1.04 K/UL (ref 0–0.85)
MONOCYTES NFR BLD AUTO: 13.3 % (ref 0–13.4)
NEUTROPHILS # BLD AUTO: 5.42 K/UL (ref 1.82–7.42)
NEUTROPHILS NFR BLD: 69.5 % (ref 44–72)
NRBC # BLD AUTO: 0 K/UL
NRBC BLD-RTO: 0 /100 WBC
PLATELET # BLD AUTO: 224 K/UL (ref 164–446)
PMV BLD AUTO: 10.3 FL (ref 9–12.9)
POTASSIUM SERPL-SCNC: 3.8 MMOL/L (ref 3.6–5.5)
PROT SERPL-MCNC: 5.5 G/DL (ref 6–8.2)
PROTHROMBIN TIME: 18.2 SEC (ref 12–14.6)
RBC # BLD AUTO: 2.61 M/UL (ref 4.7–6.1)
RH BLD: NORMAL
SODIUM SERPL-SCNC: 139 MMOL/L (ref 135–145)
TROPONIN T SERPL-MCNC: 297 NG/L (ref 6–19)
WBC # BLD AUTO: 7.8 K/UL (ref 4.8–10.8)

## 2019-11-22 PROCEDURE — 94760 N-INVAS EAR/PLS OXIMETRY 1: CPT

## 2019-11-22 PROCEDURE — 70450 CT HEAD/BRAIN W/O DYE: CPT

## 2019-11-22 PROCEDURE — 93005 ELECTROCARDIOGRAM TRACING: CPT | Performed by: EMERGENCY MEDICINE

## 2019-11-22 PROCEDURE — 770020 HCHG ROOM/CARE - TELE (206)

## 2019-11-22 PROCEDURE — 80053 COMPREHEN METABOLIC PANEL: CPT

## 2019-11-22 PROCEDURE — 86850 RBC ANTIBODY SCREEN: CPT

## 2019-11-22 PROCEDURE — 84484 ASSAY OF TROPONIN QUANT: CPT

## 2019-11-22 PROCEDURE — 85730 THROMBOPLASTIN TIME PARTIAL: CPT

## 2019-11-22 PROCEDURE — 700102 HCHG RX REV CODE 250 W/ 637 OVERRIDE(OP): Performed by: HOSPITALIST

## 2019-11-22 PROCEDURE — 99222 1ST HOSP IP/OBS MODERATE 55: CPT | Performed by: NURSE PRACTITIONER

## 2019-11-22 PROCEDURE — 92610 EVALUATE SWALLOWING FUNCTION: CPT

## 2019-11-22 PROCEDURE — 93306 TTE W/DOPPLER COMPLETE: CPT | Mod: 26 | Performed by: INTERNAL MEDICINE

## 2019-11-22 PROCEDURE — A9270 NON-COVERED ITEM OR SERVICE: HCPCS | Performed by: HOSPITALIST

## 2019-11-22 PROCEDURE — 99223 1ST HOSP IP/OBS HIGH 75: CPT | Performed by: HOSPITALIST

## 2019-11-22 PROCEDURE — 85610 PROTHROMBIN TIME: CPT

## 2019-11-22 PROCEDURE — 86900 BLOOD TYPING SEROLOGIC ABO: CPT

## 2019-11-22 PROCEDURE — 700117 HCHG RX CONTRAST REV CODE 255: Performed by: EMERGENCY MEDICINE

## 2019-11-22 PROCEDURE — 71045 X-RAY EXAM CHEST 1 VIEW: CPT

## 2019-11-22 PROCEDURE — 36415 COLL VENOUS BLD VENIPUNCTURE: CPT

## 2019-11-22 PROCEDURE — 86901 BLOOD TYPING SEROLOGIC RH(D): CPT

## 2019-11-22 PROCEDURE — 82962 GLUCOSE BLOOD TEST: CPT | Mod: 91

## 2019-11-22 PROCEDURE — 83036 HEMOGLOBIN GLYCOSYLATED A1C: CPT

## 2019-11-22 PROCEDURE — 85025 COMPLETE CBC W/AUTO DIFF WBC: CPT

## 2019-11-22 PROCEDURE — 70551 MRI BRAIN STEM W/O DYE: CPT

## 2019-11-22 PROCEDURE — 82140 ASSAY OF AMMONIA: CPT

## 2019-11-22 PROCEDURE — 99285 EMERGENCY DEPT VISIT HI MDM: CPT

## 2019-11-22 PROCEDURE — 70498 CT ANGIOGRAPHY NECK: CPT

## 2019-11-22 PROCEDURE — 0042T CT-CEREBRAL PERFUSION ANALYSIS: CPT

## 2019-11-22 PROCEDURE — 93306 TTE W/DOPPLER COMPLETE: CPT

## 2019-11-22 PROCEDURE — 70496 CT ANGIOGRAPHY HEAD: CPT

## 2019-11-22 RX ORDER — ACETAMINOPHEN 325 MG/1
650 TABLET ORAL EVERY 6 HOURS PRN
Status: DISCONTINUED | OUTPATIENT
Start: 2019-11-22 | End: 2019-11-24 | Stop reason: HOSPADM

## 2019-11-22 RX ORDER — INSULIN GLARGINE 100 [IU]/ML
5 INJECTION, SOLUTION SUBCUTANEOUS EVERY EVENING
Status: DISCONTINUED | OUTPATIENT
Start: 2019-11-22 | End: 2019-11-24 | Stop reason: HOSPADM

## 2019-11-22 RX ORDER — CLOPIDOGREL BISULFATE 75 MG/1
75 TABLET ORAL DAILY
Status: DISCONTINUED | OUTPATIENT
Start: 2019-11-22 | End: 2019-11-24 | Stop reason: HOSPADM

## 2019-11-22 RX ORDER — ATORVASTATIN CALCIUM 80 MG/1
80 TABLET, FILM COATED ORAL EVERY EVENING
Status: DISCONTINUED | OUTPATIENT
Start: 2019-11-22 | End: 2019-11-24 | Stop reason: HOSPADM

## 2019-11-22 RX ORDER — METOPROLOL TARTRATE 50 MG/1
100 TABLET, FILM COATED ORAL 2 TIMES DAILY
Status: DISCONTINUED | OUTPATIENT
Start: 2019-11-22 | End: 2019-11-22

## 2019-11-22 RX ORDER — CLOPIDOGREL BISULFATE 75 MG/1
75 TABLET ORAL DAILY
Status: ON HOLD | COMMUNITY
End: 2019-11-24 | Stop reason: SDUPTHER

## 2019-11-22 RX ORDER — METOPROLOL TARTRATE 100 MG/1
100 TABLET ORAL 2 TIMES DAILY
Status: ON HOLD | COMMUNITY
End: 2019-11-24 | Stop reason: SDUPTHER

## 2019-11-22 RX ORDER — METHIMAZOLE 5 MG/1
5 TABLET ORAL DAILY
Status: ON HOLD | COMMUNITY
End: 2019-11-24 | Stop reason: SDUPTHER

## 2019-11-22 RX ORDER — AMOXICILLIN 250 MG
2 CAPSULE ORAL 2 TIMES DAILY
Status: DISCONTINUED | OUTPATIENT
Start: 2019-11-22 | End: 2019-11-24 | Stop reason: HOSPADM

## 2019-11-22 RX ORDER — METHIMAZOLE 5 MG/1
5 TABLET ORAL DAILY
Status: DISCONTINUED | OUTPATIENT
Start: 2019-11-23 | End: 2019-11-24 | Stop reason: HOSPADM

## 2019-11-22 RX ORDER — SODIUM CHLORIDE, SODIUM LACTATE, POTASSIUM CHLORIDE, CALCIUM CHLORIDE 600; 310; 30; 20 MG/100ML; MG/100ML; MG/100ML; MG/100ML
INJECTION, SOLUTION INTRAVENOUS CONTINUOUS
Status: DISCONTINUED | OUTPATIENT
Start: 2019-11-22 | End: 2019-11-22

## 2019-11-22 RX ORDER — CALCIUM ACETATE 667 MG/1
667 TABLET ORAL
Status: DISCONTINUED | OUTPATIENT
Start: 2019-11-22 | End: 2019-11-24 | Stop reason: HOSPADM

## 2019-11-22 RX ORDER — CALCITRIOL 0.25 UG/1
0.25 CAPSULE, LIQUID FILLED ORAL
Status: DISCONTINUED | OUTPATIENT
Start: 2019-11-25 | End: 2019-11-24 | Stop reason: HOSPADM

## 2019-11-22 RX ORDER — BISACODYL 10 MG
10 SUPPOSITORY, RECTAL RECTAL
Status: DISCONTINUED | OUTPATIENT
Start: 2019-11-22 | End: 2019-11-24 | Stop reason: HOSPADM

## 2019-11-22 RX ORDER — POLYETHYLENE GLYCOL 3350 17 G/17G
1 POWDER, FOR SOLUTION ORAL
Status: DISCONTINUED | OUTPATIENT
Start: 2019-11-22 | End: 2019-11-24 | Stop reason: HOSPADM

## 2019-11-22 RX ORDER — OMEPRAZOLE 20 MG/1
20 CAPSULE, DELAYED RELEASE ORAL
Status: DISCONTINUED | OUTPATIENT
Start: 2019-11-22 | End: 2019-11-24 | Stop reason: HOSPADM

## 2019-11-22 RX ADMIN — APIXABAN 5 MG: 5 TABLET, FILM COATED ORAL at 18:24

## 2019-11-22 RX ADMIN — CLOPIDOGREL BISULFATE 75 MG: 75 TABLET ORAL at 18:05

## 2019-11-22 RX ADMIN — IOHEXOL 40 ML: 350 INJECTION, SOLUTION INTRAVENOUS at 11:04

## 2019-11-22 RX ADMIN — INSULIN GLARGINE 5 UNITS: 100 INJECTION, SOLUTION SUBCUTANEOUS at 21:09

## 2019-11-22 RX ADMIN — ATORVASTATIN CALCIUM 80 MG: 80 TABLET, FILM COATED ORAL at 18:04

## 2019-11-22 RX ADMIN — Medication 667 MG: at 18:04

## 2019-11-22 RX ADMIN — IOHEXOL 100 ML: 350 INJECTION, SOLUTION INTRAVENOUS at 11:05

## 2019-11-22 ASSESSMENT — ENCOUNTER SYMPTOMS
SENSORY CHANGE: 0
BLURRED VISION: 0
SPEECH CHANGE: 1
ABDOMINAL PAIN: 0
WEAKNESS: 1
RESPIRATORY NEGATIVE: 1
LOSS OF CONSCIOUSNESS: 0
EYES NEGATIVE: 1
SHORTNESS OF BREATH: 0
NAUSEA: 0
CARDIOVASCULAR NEGATIVE: 1
GASTROINTESTINAL NEGATIVE: 1
HEADACHES: 0
FEVER: 0
PALPITATIONS: 0
VOMITING: 0
COUGH: 0
CONSTITUTIONAL NEGATIVE: 1
DIARRHEA: 0
DIZZINESS: 1
BRUISES/BLEEDS EASILY: 1
BACK PAIN: 0
FOCAL WEAKNESS: 0
SORE THROAT: 0
CHILLS: 0
MUSCULOSKELETAL NEGATIVE: 1
WEAKNESS: 0
DOUBLE VISION: 0

## 2019-11-22 ASSESSMENT — LIFESTYLE VARIABLES
EVER_SMOKED: YES
CONSUMPTION TOTAL: NEGATIVE
TOTAL SCORE: 0
EVER_SMOKED: YES
ON A TYPICAL DAY WHEN YOU DRINK ALCOHOL HOW MANY DRINKS DO YOU HAVE: 0
HAVE PEOPLE ANNOYED YOU BY CRITICIZING YOUR DRINKING: NO
TOTAL SCORE: 0
ALCOHOL_USE: NO
EVER HAD A DRINK FIRST THING IN THE MORNING TO STEADY YOUR NERVES TO GET RID OF A HANGOVER: NO
AVERAGE NUMBER OF DAYS PER WEEK YOU HAVE A DRINK CONTAINING ALCOHOL: 0
PACK_YEARS: 40
HAVE YOU EVER FELT YOU SHOULD CUT DOWN ON YOUR DRINKING: NO
TOTAL SCORE: 0
HOW MANY TIMES IN THE PAST YEAR HAVE YOU HAD 5 OR MORE DRINKS IN A DAY: 0
EVER FELT BAD OR GUILTY ABOUT YOUR DRINKING: NO

## 2019-11-22 ASSESSMENT — PATIENT HEALTH QUESTIONNAIRE - PHQ9
1. LITTLE INTEREST OR PLEASURE IN DOING THINGS: NOT AT ALL
2. FEELING DOWN, DEPRESSED, IRRITABLE, OR HOPELESS: NOT AT ALL
SUM OF ALL RESPONSES TO PHQ9 QUESTIONS 1 AND 2: 0

## 2019-11-22 ASSESSMENT — CHA2DS2 SCORE
AGE 75 OR GREATER: YES
PRIOR STROKE OR TIA OR THROMBOEMBOLISM: NO
VASCULAR DISEASE: YES
SEX: MALE
DIABETES: YES
CHF OR LEFT VENTRICULAR DYSFUNCTION: NO
HYPERTENSION: YES
AGE 65 TO 74: NO
CHA2DS2 VASC SCORE: 5

## 2019-11-22 ASSESSMENT — COPD QUESTIONNAIRES
DURING THE PAST 4 WEEKS HOW MUCH DID YOU FEEL SHORT OF BREATH: NONE/LITTLE OF THE TIME
HAVE YOU SMOKED AT LEAST 100 CIGARETTES IN YOUR ENTIRE LIFE: YES
COPD SCREENING SCORE: 5
DO YOU EVER COUGH UP ANY MUCUS OR PHLEGM?: YES, A FEW DAYS A WEEK OR MONTH

## 2019-11-22 NOTE — ED TRIAGE NOTES
DIANELYS CALHOUN to charge desk for code stroke.  At 0930 this morning pt's son noticed slurred speech, no other neuro deficits.  Pt was here recently for a NSTEMI and had a clean cath with a patent stent.  Was started on eliquis and has been taking it BID.  Pt was also recent;y started on dialysis 1 week ago, last dialysis was yesterday.  Pt is AOx4, moves all extremities.  CT scan complete, report given to Jovan MONTOYA.

## 2019-11-22 NOTE — ED NOTES
Dr. Salcedo aware of Cr from 11/18. Per report patient recently began dialysis and was last dialyzed yesterday. Full stroke protocol ordered per Dr. Salcedo. To CT by benji.

## 2019-11-22 NOTE — THERAPY
"Speech Language Therapy Clinical Swallow Evaluation completed.  Functional Status: The patient was seen on this date for clinical swallow evaluation. The patient was awake, alert and orientated x4 upon arrival of SLP. Oral mechanism exam was completed and unremarkable. The patient's voice remained strong and clear throughout the evaluation. Hyolaryngeal excursion was palpated as complete and timely. The patient was presented with PO trials including ice chips, thin liquids via tsp and cup sips, puree, soft solids, mixed consistencies and regular textures. The patient consumed all textures with no overt s/sx of aspiration/penetration. Mastication and swallow were noted to be complete and timely. Recommend regular texture with thin liquids. Please re-consult with any change in status.   Recommendations - Diet:  Regular with thin liquids                           Strategies: HOB at 90 degrees                          Medication Administration:  Whole with liquid wash  Plan of Care: Patient with no further skilled SLP needs in the acute care setting at this time  Post-Acute Therapy: Anticipate that the patient will have no further speech therapy needs after discharge from the hospital.    See \"Rehab Therapy-Acute\" Patient Summary Report for complete documentation.  "

## 2019-11-22 NOTE — ED PROVIDER NOTES
ED Provider Note    CHIEF COMPLAINT  Chief Complaint   Patient presents with   • Slurred Speech       HPI  Luis Sepulveda is a 73 y.o. male who presents for evaluation of some confusion, dysarthria starting at around 9:30 AM today.  The patient has an extensive and complicated medical history including end-stage renal disease for which he is on dialysis diabetes hypertension coronary artery disease.  He recently had acute coronary syndrome with myocardial infarction last month.  He was at home developed the symptoms and a stroke activation was initiated.  The patient is on both Plavix and Eliquis twice daily.  He reports that he took his medications this morning.  No associated numbness weakness tingling to the arms legs or face.  He denies any headache.  He reports no recent head injury or fall.  He is on dialysis.  He has been compliant with his Eliquis.  Paramedics noted that he had an irregular pulse in the field and he has a history of atrial for ablation    REVIEW OF SYSTEMS  See HPI for further details.  No focal numbness weakness tingling to the arms legs or face.  All other systems are negative.     PAST MEDICAL HISTORY  Past Medical History:   Diagnosis Date   • CAD (coronary artery disease)     stents   • Chronic kidney disease (CKD), stage V (HCC)    • Diabetes    • Hypertension    • Osteoarthritis    • Peripheral neuropathy        FAMILY HISTORY  Noncontributory    SOCIAL HISTORY  Social History     Socioeconomic History   • Marital status:      Spouse name: Not on file   • Number of children: Not on file   • Years of education: Not on file   • Highest education level: Not on file   Occupational History   • Not on file   Social Needs   • Financial resource strain: Not on file   • Food insecurity:     Worry: Not on file     Inability: Not on file   • Transportation needs:     Medical: Not on file     Non-medical: Not on file   Tobacco Use   • Smoking status: Former Smoker     Packs/day:  1.00     Years: 55.00     Pack years: 55.00     Last attempt to quit: 2014     Years since quittin.8   • Smokeless tobacco: Never Used   Substance and Sexual Activity   • Alcohol use: No   • Drug use: No   • Sexual activity: Not on file   Lifestyle   • Physical activity:     Days per week: Not on file     Minutes per session: Not on file   • Stress: Not on file   Relationships   • Social connections:     Talks on phone: Not on file     Gets together: Not on file     Attends Nondenominational service: Not on file     Active member of club or organization: Not on file     Attends meetings of clubs or organizations: Not on file     Relationship status: Not on file   • Intimate partner violence:     Fear of current or ex partner: Not on file     Emotionally abused: Not on file     Physically abused: Not on file     Forced sexual activity: Not on file   Other Topics Concern   • Not on file   Social History Narrative   • Not on file       SURGICAL HISTORY  Past Surgical History:   Procedure Laterality Date   • APPENDECTOMY     • OTHER CARDIAC SURGERY      stents   • OTHER ORTHOPEDIC SURGERY      knee surgery       CURRENT MEDICATIONS  No current facility-administered medications for this encounter.     Current Outpatient Medications:   •  atorvastatin (LIPITOR) 80 MG tablet, Take 1 Tab by mouth every evening., Disp: 30 Tab, Rfl: 2  •  calcium acetate (PHOS-LO) 667 MG Tab tablet, Take 1 Tab by mouth 3 times a day before meals., Disp: 180 Tab, Rfl: 1  •  calcitRIOL (ROCALTROL) 0.25 MCG Cap, Take 1 Cap by mouth every Monday, Wednesday, and Friday., Disp: 30 Cap, Rfl: 1  •  nitroglycerin (NITROSTAT) 0.4 MG SL Tab, Place 1 Tab under tongue as needed for Chest Pain (donot take if Blood pressure is less than 100/60 mm of Hg) for up to 10 doses., Disp: 10 Tab, Rfl: 0  •  insulin glargine (LANTUS SOLOSTAR) 100 UNIT/ML Solution Pen-injector injection, Inject 5 Units as instructed every evening for 60 days., Disp: 3 mL, Rfl: 0  •   apixaban (ELIQUIS) 5mg Tab, Take 1 Tab by mouth 2 Times a Day for 4 days., Disp: 8 Tab, Rfl: 0  •  clopidogrel (PLAVIX) 75 MG Tab, Take 1 Tab by mouth every day for 4 days., Disp: 4 Tab, Rfl: 0  •  metoprolol (LOPRESSOR) 100 MG Tab, Take 1 Tab by mouth 2 Times a Day for 4 days., Disp: 8 Tab, Rfl: 0  •  methimazole (TAPAZOLE) 5 MG Tab, Take 1 Tab by mouth every day for 4 days., Disp: 4 Tab, Rfl: 0  •  furosemide (LASIX) 40 MG Tab, Take 40 mg by mouth 1 time daily as needed. Indications: Edema, Disp: , Rfl:   •  acetaminophen (TYLENOL) 500 MG Tab, Take 500-1,000 mg by mouth every 6 hours as needed for Moderate Pain., Disp: , Rfl:   •  pantoprazole (PROTONIX) 40 MG Tablet Delayed Response, Take 40 mg by mouth 1 time daily as needed., Disp: , Rfl:       ALLERGIES  No Known Allergies    PHYSICAL EXAM  VITAL SIGNS: Pulse 80   Wt 72.3 kg (159 lb 6.3 oz)   SpO2 100%   BMI 26.52 kg/m²       Constitutional: Well developed, Well nourished, No acute distress, Non-toxic appearance.   HENT: Normocephalic, Atraumatic, Bilateral external ears normal, Oropharynx moist, No oral exudates, Nose normal.   Eyes: PERRLA, EOMI, Conjunctiva normal, No discharge.   Neck: Normal range of motion, No tenderness, Supple, No stridor.   Cardiovascular: Normal heart rate, Normal rhythm, No murmurs, No rubs, No gallops.   Thorax & Lungs: Normal breath sounds, No respiratory distress, No wheezing, No chest tenderness.   Abdomen: Bowel sounds normal, Soft, No tenderness, No masses, No pulsatile masses.   Skin: Warm, Dry, No erythema, No rash.   Back: No tenderness, No CVA tenderness.   Extremities: Intact distal pulses, No edema, No tenderness, No cyanosis, No clubbing.   Musculoskeletal: Good range of motion in all major joints. No tenderness to palpation or major deformities noted.   Neurologic: Alert & oriented x 3, Normal motor function, Normal sensory function patient has very subtle dysarthria no facial droop no pronator drift finger-nose  testing is normal.  Heel-to-shin testing is normal visual fields grossly normal.  NIH stroke scale score of 1  Psychiatric: Anxious l.       RADIOLOGY/PROCEDURES  Results for orders placed or performed during the hospital encounter of 11/22/19   CBC WITH DIFFERENTIAL   Result Value Ref Range    WBC 7.8 4.8 - 10.8 K/uL    RBC 2.61 (L) 4.70 - 6.10 M/uL    Hemoglobin 8.0 (L) 14.0 - 18.0 g/dL    Hematocrit 24.3 (L) 42.0 - 52.0 %    MCV 93.1 81.4 - 97.8 fL    MCH 30.7 27.0 - 33.0 pg    MCHC 32.9 (L) 33.7 - 35.3 g/dL    RDW 44.3 35.9 - 50.0 fL    Platelet Count 224 164 - 446 K/uL    MPV 10.3 9.0 - 12.9 fL    Neutrophils-Polys 69.50 44.00 - 72.00 %    Lymphocytes 15.80 (L) 22.00 - 41.00 %    Monocytes 13.30 0.00 - 13.40 %    Eosinophils 0.40 0.00 - 6.90 %    Basophils 0.40 0.00 - 1.80 %    Immature Granulocytes 0.60 0.00 - 0.90 %    Nucleated RBC 0.00 /100 WBC    Neutrophils (Absolute) 5.42 1.82 - 7.42 K/uL    Lymphs (Absolute) 1.23 1.00 - 4.80 K/uL    Monos (Absolute) 1.04 (H) 0.00 - 0.85 K/uL    Eos (Absolute) 0.03 0.00 - 0.51 K/uL    Baso (Absolute) 0.03 0.00 - 0.12 K/uL    Immature Granulocytes (abs) 0.05 0.00 - 0.11 K/uL    NRBC (Absolute) 0.00 K/uL   COMP METABOLIC PANEL   Result Value Ref Range    Sodium 139 135 - 145 mmol/L    Potassium 3.8 3.6 - 5.5 mmol/L    Chloride 101 96 - 112 mmol/L    Co2 29 20 - 33 mmol/L    Anion Gap 9.0 0.0 - 11.9    Glucose 105 (H) 65 - 99 mg/dL    Bun 17 8 - 22 mg/dL    Creatinine 2.91 (H) 0.50 - 1.40 mg/dL    Calcium 8.0 (L) 8.5 - 10.5 mg/dL    AST(SGOT) 15 12 - 45 U/L    ALT(SGPT) 14 2 - 50 U/L    Alkaline Phosphatase 126 (H) 30 - 99 U/L    Total Bilirubin 0.6 0.1 - 1.5 mg/dL    Albumin 3.1 (L) 3.2 - 4.9 g/dL    Total Protein 5.5 (L) 6.0 - 8.2 g/dL    Globulin 2.4 1.9 - 3.5 g/dL    A-G Ratio 1.3 g/dL   PROTHROMBIN TIME   Result Value Ref Range    PT 18.2 (H) 12.0 - 14.6 sec    INR 1.47 (H) 0.87 - 1.13   APTT   Result Value Ref Range    APTT 33.5 24.7 - 36.0 sec   TROPONIN   Result  Value Ref Range    Troponin T 297 (H) 6 - 19 ng/L   ESTIMATED GFR   Result Value Ref Range    GFR If  26 (A) >60 mL/min/1.73 m 2    GFR If Non African American 21 (A) >60 mL/min/1.73 m 2   EKG (NOW)   Result Value Ref Range    Report       [facility] Emergency Dept.    Test Date:  2019  Pt Name:    KLARISSA BRADSHAW              Department: ER  MRN:        [id number]                      Room:        05  Gender:     Male                         Technician: [id number]  :        1945                   Requested By:GIANNI SUAREZ  Order #:    [id number]                    Reading MD:    Measurements  Intervals                                Axis  Rate:       79                           P:          62  IA:         196                          QRS:        -63  QRSD:       136                          T:          176  QT:         408  QTc:        468    Interpretive Statements  SINUS RHYTHM  RBBB AND LAFB  Compared to ECG 2019 10:04:46  Atrial abnormality no longer present        EKG interpretation by me rate 79 sinus rhythm right bundle branch block.  No acute ST segment elevation or depression no ectopy intervals and axis are otherwise normal.  No suggestion of ischemia or arrhythmia    CT-CTA HEAD WITH & W/O-POST PROCESS   Final Result      CT angiogram of the [place] within normal limits. No large vessel occlusions identified.   No acute intracranial hemorrhage.      CT-CTA NECK WITH & W/O-POST PROCESSING   Final Result      1.  Mild bilateral common carotid artery stenosis with noncalcified plaque present, less than 50% luminal narrowing.   2.  No focal high-grade stenosis, dissection or occlusion of the cervical carotid or vertebral arteries.   3.  Small RIGHT apical lung nodule, nonspecific.      CT-CEREBRAL PERFUSION ANALYSIS   Final Result      1.  Cerebral blood flow less than 30% likely representing completed infarct = 0 mL.      2.  T Max more than 6  seconds likely representing combination of completed infarct and ischemia = 0 mL.      3.  Mismatched volume likely representing ischemic brain/penumbra = None      4.  Please note that the cerebral perfusion was performed on the limited brain tissue around the basal ganglia region. Infarct/ischemia outside the CT perfusion sections can be missed in this study.      CT-HEAD W/O   Final Result      1.  Cerebral atrophy.      2.  White matter lucencies most consistent with small vessel ischemic change versus demyelination or gliosis.      3.  Otherwise, Head CT without contrast with no acute findings. No evidence of acute cerebral hemorrhage or mass lesion.      DX-CHEST-PORTABLE (1 VIEW)    (Results Pending)         COURSE & MEDICAL DECISION MAKING  Pertinent Labs & Imaging studies reviewed. (See chart for details)  The patient was a code stroke based on symptoms.  I reviewed the patient's records.  He has exclusion criteria including use of Eliquis only confirmed he took a dose today.  His symptoms are isolated to dysarthria.  He was evaluated by neurology and they have determined based on his CT scans that he is not a candidate for thrombolytic therapy or clot retrieval.  He could have a subtle stroke or another metabolic derangement treating his symptoms.  Patient will need to be admitted to internal medicine with neurological consultation they recommend MRI.  I consulted nephrology with Dr. Hodges regarding the patient's need for dialysis likely tomorrow.  He will be admitted in guarded condition    FINAL IMPRESSION  1.  Dysarthria  2.  End-stage renal disease         Electronically signed by: Db Salcedo, 11/22/2019 11:44 AM

## 2019-11-22 NOTE — ED NOTES
Lab called with critical result of troponin at 297. Critical lab result read back to Danyel MONTOYA.   Dr. Fabian notified of critical lab result at 1122.  Critical lab result read back by Dr. Fabian.

## 2019-11-22 NOTE — PROGRESS NOTES
Nephrology Daily Progress Note    Date of Service  11/22/2019    Chief Complaint  73 y.o. male admitted 11/22/2019 with new ESRD presented after being discharged earlier in the week for eval of CVA. Pt reprots difficulty with fine motor skill and word finding. He now states that his symtpoms are improving, but he is mildly delayed from my prior interactions    Interval Problem Update  11/13/19--No events, tunneled HD catheter placed yest by IR and HD initiated, BP low during HD and no fluid removed, pt feels nauseous and weak today, BP stable, acidosis improved, serum K low, BUN improved with HD, getting PRBC transfusion today  11/14/19--Hd treatment #3 today, he reports occasional nausea, and sob.  He reports tolerating HD well and believes it is helping with SOB.    11/15/19--No HD today, Plan for cardiac cath tomorrow. Pt sitting up at bedside, he is feeling well today.  Tolerated HD well yesterday.  Denies pain, n/v, sob.  He is NPO for cardiac cath.    11/16/19--No events, cardiac cath done today and recommendation for medical management and no stents placed, BP stable, seen on dialysis this afternoon  11/17/19--No events, BP stable, tolerated HD yesterday, no new complaints, remains on maintenance IVF's  11/18/19- Pt reports increased mobility from prior, no chest pain  11/22/19 Returns to hospital for eval of DVA, HD yesterday    Review of Systems  Review of Systems   Constitutional: Negative.    HENT: Negative.    Eyes: Negative.    Respiratory: Negative.    Cardiovascular: Negative.    Gastrointestinal: Negative.    Genitourinary: Negative.    Musculoskeletal: Negative.    Skin: Negative.    Neurological: Positive for speech change and weakness.   Endo/Heme/Allergies: Bruises/bleeds easily.        Physical Exam  Temp:  [37 °C (98.6 °F)-37.6 °C (99.7 °F)] 37 °C (98.6 °F)  Pulse:  [71-96] 96  Resp:  [16-18] 18  BP: (120-162)/(48-91) 136/91  SpO2:  [94 %-100 %] 94 %    Physical Exam  Vitals signs and nursing  note reviewed.   Constitutional:       Appearance: Normal appearance.   HENT:      Head: Normocephalic and atraumatic.      Mouth/Throat:      Mouth: Mucous membranes are dry.   Eyes:      Conjunctiva/sclera: Conjunctivae normal.      Pupils: Pupils are equal, round, and reactive to light.   Neck:      Musculoskeletal: Normal range of motion and neck supple.      Comments: RIJ tunnel cath  Cardiovascular:      Rate and Rhythm: Normal rate and regular rhythm.      Pulses: Normal pulses.      Heart sounds: Normal heart sounds.   Pulmonary:      Effort: Pulmonary effort is normal.      Breath sounds: Normal breath sounds.   Abdominal:      General: Abdomen is flat.      Palpations: Abdomen is soft.   Musculoskeletal: Normal range of motion.         General: No swelling.   Skin:     General: Skin is warm and dry.      Comments: ehcymosis   Neurological:      General: No focal deficit present.      Mental Status: He is alert and oriented to person, place, and time.      Comments: Pt has decreased speech fluidity from prior   Psychiatric:         Mood and Affect: Mood normal.         Thought Content: Thought content normal.         Fluids  No intake or output data in the 24 hours ending 11/22/19 1551    Laboratory  Recent Labs     11/22/19  1053   WBC 7.8   RBC 2.61*   HEMOGLOBIN 8.0*   HEMATOCRIT 24.3*   MCV 93.1   MCH 30.7   MCHC 32.9*   RDW 44.3   PLATELETCT 224   MPV 10.3     Recent Labs     11/22/19  1053   SODIUM 139   POTASSIUM 3.8   CHLORIDE 101   CO2 29   GLUCOSE 105*   BUN 17   CREATININE 2.91*   CALCIUM 8.0*     Recent Labs     11/22/19  1053   APTT 33.5   INR 1.47*     No results for input(s): NTPROBNP in the last 72 hours.        Imaging  1. New ESRD, HD T,TH,SAt  --R Chest PC placed 11/12 and HD initiated   --[facility]  2. CAD cath completed 11/16-medical mangement  4. HTN--stable  5. DM II--per primary svc  6. Renal Osteodystrophy/CKD Bone Mineral Disorder--phos elevated  --calcium acetate started    --  7. Hyperthyroidism--on methimazone  8. Anemia--secondary to CKD  --No need for IV Iron  -- PRBC transfusion 11/13  9. Secondary Hyperparathyroidism  10. 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 Pruritis--likely related to hyperphosphatemia  14. Moderate Protein-Calorie Malnutrition    Plan  HD in am  Stroke eval  Anticoagulation  DC IVF  EPO      Assessment/Plan  No new Assessment & Plan notes have been filed under this hospital service since the last note was generated.  Service: Nephrology

## 2019-11-22 NOTE — DISCHARGE PLANNING
[facility] Transitional Care Coordination     Referral from:  Dr. Rangel    Facesheet indicates: [facility]    Potential Rehab Diagnosis: R/O CVA.  Recent NSTEMI    Chart review indicates patient may have on going medical management and may have therapy needs to possibly meet inpatient rehab facility criteria with the goal of returning to community.    D/C support: TBD     Physiatry consultation pended per protocol.      R/O CVA.  Recent NSTEMI.  W/U & TX evals are pending.     Thank you for the referral.

## 2019-11-22 NOTE — ED NOTES
Pt. Being transferred to Neuro floor at this time, pt. Alert and oriented, son  Called and updated, floor notified of incoming patient.

## 2019-11-23 PROBLEM — G93.41 ACUTE METABOLIC ENCEPHALOPATHY: Status: ACTIVE | Noted: 2019-11-22

## 2019-11-23 LAB
ANION GAP SERPL CALC-SCNC: 6 MMOL/L (ref 0–11.9)
ANION GAP SERPL CALC-SCNC: 9 MMOL/L (ref 0–11.9)
BUN SERPL-MCNC: 13 MG/DL (ref 8–22)
BUN SERPL-MCNC: 22 MG/DL (ref 8–22)
CALCIUM SERPL-MCNC: 7.5 MG/DL (ref 8.5–10.5)
CALCIUM SERPL-MCNC: 7.6 MG/DL (ref 8.5–10.5)
CHLORIDE SERPL-SCNC: 102 MMOL/L (ref 96–112)
CHLORIDE SERPL-SCNC: 103 MMOL/L (ref 96–112)
CHOLEST SERPL-MCNC: 74 MG/DL (ref 100–199)
CO2 SERPL-SCNC: 29 MMOL/L (ref 20–33)
CO2 SERPL-SCNC: 30 MMOL/L (ref 20–33)
CREAT SERPL-MCNC: 2.54 MG/DL (ref 0.5–1.4)
CREAT SERPL-MCNC: 3.65 MG/DL (ref 0.5–1.4)
ERYTHROCYTE [DISTWIDTH] IN BLOOD BY AUTOMATED COUNT: 45.1 FL (ref 35.9–50)
GLUCOSE BLD-MCNC: 119 MG/DL (ref 65–99)
GLUCOSE BLD-MCNC: 138 MG/DL (ref 65–99)
GLUCOSE BLD-MCNC: 89 MG/DL (ref 65–99)
GLUCOSE BLD-MCNC: 99 MG/DL (ref 65–99)
GLUCOSE SERPL-MCNC: 121 MG/DL (ref 65–99)
GLUCOSE SERPL-MCNC: 92 MG/DL (ref 65–99)
HCT VFR BLD AUTO: 24.4 % (ref 42–52)
HDLC SERPL-MCNC: 15 MG/DL
HGB BLD-MCNC: 8 G/DL (ref 14–18)
LDLC SERPL CALC-MCNC: 40 MG/DL
MAGNESIUM SERPL-MCNC: 1.6 MG/DL (ref 1.5–2.5)
MCH RBC QN AUTO: 30.8 PG (ref 27–33)
MCHC RBC AUTO-ENTMCNC: 32.8 G/DL (ref 33.7–35.3)
MCV RBC AUTO: 93.8 FL (ref 81.4–97.8)
PLATELET # BLD AUTO: 206 K/UL (ref 164–446)
PMV BLD AUTO: 9.4 FL (ref 9–12.9)
POTASSIUM SERPL-SCNC: 3.4 MMOL/L (ref 3.6–5.5)
POTASSIUM SERPL-SCNC: 3.7 MMOL/L (ref 3.6–5.5)
RBC # BLD AUTO: 2.6 M/UL (ref 4.7–6.1)
SODIUM SERPL-SCNC: 138 MMOL/L (ref 135–145)
SODIUM SERPL-SCNC: 141 MMOL/L (ref 135–145)
TRIGL SERPL-MCNC: 97 MG/DL (ref 0–149)
WBC # BLD AUTO: 5.7 K/UL (ref 4.8–10.8)

## 2019-11-23 PROCEDURE — A9270 NON-COVERED ITEM OR SERVICE: HCPCS | Performed by: HOSPITALIST

## 2019-11-23 PROCEDURE — 700102 HCHG RX REV CODE 250 W/ 637 OVERRIDE(OP): Performed by: HOSPITALIST

## 2019-11-23 PROCEDURE — 36415 COLL VENOUS BLD VENIPUNCTURE: CPT

## 2019-11-23 PROCEDURE — 770020 HCHG ROOM/CARE - TELE (206)

## 2019-11-23 PROCEDURE — 83735 ASSAY OF MAGNESIUM: CPT

## 2019-11-23 PROCEDURE — 700111 HCHG RX REV CODE 636 W/ 250 OVERRIDE (IP)

## 2019-11-23 PROCEDURE — 99233 SBSQ HOSP IP/OBS HIGH 50: CPT | Performed by: PSYCHIATRY & NEUROLOGY

## 2019-11-23 PROCEDURE — 97165 OT EVAL LOW COMPLEX 30 MIN: CPT

## 2019-11-23 PROCEDURE — 80061 LIPID PANEL: CPT

## 2019-11-23 PROCEDURE — 82962 GLUCOSE BLOOD TEST: CPT

## 2019-11-23 PROCEDURE — 99232 SBSQ HOSP IP/OBS MODERATE 35: CPT | Performed by: HOSPITALIST

## 2019-11-23 PROCEDURE — 85027 COMPLETE CBC AUTOMATED: CPT

## 2019-11-23 PROCEDURE — 80048 BASIC METABOLIC PNL TOTAL CA: CPT

## 2019-11-23 PROCEDURE — 90935 HEMODIALYSIS ONE EVALUATION: CPT

## 2019-11-23 RX ORDER — HEPARIN SODIUM 1000 [USP'U]/ML
INJECTION, SOLUTION INTRAVENOUS; SUBCUTANEOUS
Status: COMPLETED
Start: 2019-11-23 | End: 2019-11-23

## 2019-11-23 RX ORDER — HEPARIN SODIUM 1000 [USP'U]/ML
3700 INJECTION, SOLUTION INTRAVENOUS; SUBCUTANEOUS
Status: DISCONTINUED | OUTPATIENT
Start: 2019-11-23 | End: 2019-11-24 | Stop reason: HOSPADM

## 2019-11-23 RX ADMIN — APIXABAN 5 MG: 5 TABLET, FILM COATED ORAL at 17:50

## 2019-11-23 RX ADMIN — Medication 667 MG: at 07:32

## 2019-11-23 RX ADMIN — HEPARIN SODIUM 3700 UNITS: 1000 INJECTION, SOLUTION INTRAVENOUS; SUBCUTANEOUS at 13:10

## 2019-11-23 RX ADMIN — METHIMAZOLE 5 MG: 5 TABLET ORAL at 05:19

## 2019-11-23 RX ADMIN — Medication 667 MG: at 17:50

## 2019-11-23 RX ADMIN — INSULIN GLARGINE 5 UNITS: 100 INJECTION, SOLUTION SUBCUTANEOUS at 17:46

## 2019-11-23 RX ADMIN — ATORVASTATIN CALCIUM 80 MG: 80 TABLET, FILM COATED ORAL at 17:51

## 2019-11-23 RX ADMIN — Medication 667 MG: at 13:54

## 2019-11-23 RX ADMIN — CLOPIDOGREL BISULFATE 75 MG: 75 TABLET ORAL at 17:50

## 2019-11-23 RX ADMIN — ERYTHROPOIETIN 10000 UNITS: 10000 INJECTION, SOLUTION INTRAVENOUS; SUBCUTANEOUS at 13:08

## 2019-11-23 RX ADMIN — APIXABAN 5 MG: 5 TABLET, FILM COATED ORAL at 05:19

## 2019-11-23 ASSESSMENT — COGNITIVE AND FUNCTIONAL STATUS - GENERAL
SUGGESTED CMS G CODE MODIFIER MOBILITY: CK
DRESSING REGULAR LOWER BODY CLOTHING: A LITTLE
CLIMB 3 TO 5 STEPS WITH RAILING: A LITTLE
SUGGESTED CMS G CODE MODIFIER DAILY ACTIVITY: CI
SUGGESTED CMS G CODE MODIFIER DAILY ACTIVITY: CJ
MOBILITY SCORE: 19
MOVING TO AND FROM BED TO CHAIR: A LITTLE
HELP NEEDED FOR BATHING: A LITTLE
WALKING IN HOSPITAL ROOM: A LITTLE
STANDING UP FROM CHAIR USING ARMS: A LITTLE
HELP NEEDED FOR BATHING: A LITTLE
DAILY ACTIVITIY SCORE: 23
DAILY ACTIVITIY SCORE: 22
MOVING FROM LYING ON BACK TO SITTING ON SIDE OF FLAT BED: A LITTLE

## 2019-11-23 ASSESSMENT — ENCOUNTER SYMPTOMS
PALPITATIONS: 0
CHILLS: 0
HEADACHES: 0
MUSCULOSKELETAL NEGATIVE: 1
RESPIRATORY NEGATIVE: 1
DIZZINESS: 0
NAUSEA: 0
GASTROINTESTINAL NEGATIVE: 1
BLURRED VISION: 0
COUGH: 0
EYES NEGATIVE: 1
CARDIOVASCULAR NEGATIVE: 1
MYALGIAS: 0
HEARTBURN: 0
BRUISES/BLEEDS EASILY: 1
VOMITING: 0
WEIGHT LOSS: 0
SPEECH CHANGE: 1
CONSTITUTIONAL NEGATIVE: 1
FEVER: 0
WEAKNESS: 1
DOUBLE VISION: 0

## 2019-11-23 ASSESSMENT — ACTIVITIES OF DAILY LIVING (ADL): TOILETING: INDEPENDENT

## 2019-11-23 NOTE — DISCHARGE PLANNING
Outpatient Dialysis Note    Confirmed patient is active at:    [facility]  [address]   [place] [address]     Schedule: Tuesday, Thursday, Saturday  Time: 6:00am     Spoke with Hui at facility who confirmed.    Forwarded records for review.    Becca Bojorquez- Dialysis Coordinator  [employer] # [phone]

## 2019-11-23 NOTE — PROGRESS NOTES
Neurology Progress Note  Neurohospitalist Service, [facility]    Referring Physician: Andrew Marx M.D.    Chief Complaint   Patient presents with   • Slurred Speech       HPI: Refer to initial documented Neurology H&P, as detailed in the patient's chart.    Interval History 1/23/19: No acute events overnight.  Today's history was taken at the bedside as patient was being transported to dialysis.  Denies headache.  Says he feels like he is back to his baseline.    Review of systems: In addition to what is detailed in the HPI and/or updated in the interval history, all other systems reviewed and are negative.    Past Medical History:    has a past medical history of CAD (coronary artery disease), Chronic kidney disease (CKD), stage V (HCC), Diabetes, Hypertension, Osteoarthritis, and Peripheral neuropathy.    FHx:  family history includes Alcohol/Drug in his father; Diabetes in his brother; Heart Disease in his mother; Thyroid in his son.    SHx:   reports that he quit smoking about 5 years ago. He has a 55.00 pack-year smoking history. He has never used smokeless tobacco. He reports that he does not drink alcohol or use drugs.    Medications:    Current Facility-Administered Medications:   •  HEPARIN SODIUM (PORCINE) 1000 UNIT/ML INJ SOLN, , , ,   •  EPOETIN PAT 56221 UNIT/ML INJ SOLN NON-ESRD, , , ,   •  heparin injection 3,700 Units, 3,700 Units, Intracatheter, ACUTE DIALYSIS PRN, Rico Curtis M.D.  •  omeprazole (PRILOSEC) capsule 20 mg, 20 mg, Oral, QDAY PRN, Driss Rangel D.O.  •  methimazole (TAPAZOLE) tablet 5 mg, 5 mg, Oral, DAILY, Driss Rangel D.O., 5 mg at 11/23/19 0519  •  insulin glargine (LANTUS) injection 5 Units, 5 Units, Subcutaneous, Q EVENING, Driss Rangel D.O., 5 Units at 11/22/19 2109  •  clopidogrel (PLAVIX) tablet 75 mg, 75 mg, Oral, DAILY, Driss Rangel D.O., 75 mg at 11/22/19 1805  •  calcium acetate (PHOS-LO) 667 MG  tablet 667 mg, 667 mg, Oral, TID AC, LOVE ShenO., 667 mg at 11/23/19 0732  •  [START ON 11/25/2019] calcitRIOL (ROCALTROL) capsule 0.25 mcg, 0.25 mcg, Oral, MO, WE + FR, ADE Shen.O.  •  atorvastatin (LIPITOR) tablet 80 mg, 80 mg, Oral, Q EVENING, LOVE ShenO., 80 mg at 11/22/19 1804  •  senna-docusate (PERICOLACE or SENOKOT S) 8.6-50 MG per tablet 2 Tab, 2 Tab, Oral, BID **AND** polyethylene glycol/lytes (MIRALAX) PACKET 1 Packet, 1 Packet, Oral, QDAY PRN **AND** magnesium hydroxide (MILK OF MAGNESIA) suspension 30 mL, 30 mL, Oral, QDAY PRN **AND** bisacodyl (DULCOLAX) suppository 10 mg, 10 mg, Rectal, QDAY PRN, ADE Shen.JOSE LUIS.  •  Pharmacy consult request - Allow for permissive hypertension: SBP up to 220 mmHg/DBP up to 120 mmHg x 48 hours, , Other, PHARMACY TO DOSE, Driss Rangel D.O.  •  Respiratory Care per Protocol, , Nebulization, Continuous RT, Driss Rangel D.O.  •  acetaminophen (TYLENOL) tablet 650 mg, 650 mg, Oral, Q6HRS PRN, Driss Rangel D.O.  •  epoetin jared (EPOGEN/PROCRIT) injection 10,000 Units, 10,000 Units, Subcutaneous, TUE+THU+SAT, Kulwant Hodges M.D.  •  apixaban (ELIQUIS) tablet 5 mg, 5 mg, Oral, BID, LOVE ShenO., 5 mg at 11/23/19 0519  •  insulin regular (HUMULIN R) injection 2-9 Units, 2-9 Units, Subcutaneous, 4X/DAY ACHS, Stopped at 11/23/19 0700 **AND** Accu-Chek Q6 if NPO, , , Q6H **AND** NOTIFY MD and PharmD, , , Once **AND** glucose 4 g chewable tablet 16 g, 16 g, Oral, Q15 MIN PRN **AND** DEXTROSE 10% BOLUS 250 mL, 250 mL, Intravenous, Q15 MIN PRN, Driss Rangel D.O.    Physical Examination:     Vitals:    11/22/19 2000 11/23/19 0000 11/23/19 0400 11/23/19 0730   BP: 135/55 112/64 110/47 140/48   Pulse: 84 76 78 76   Resp: 18 16 16 16   Temp: 36.6 °C (97.9 °F) 37.1 °C (98.8 °F) 36.9 °C (98.4 °F) 37.2 °C (98.9 °F)   TempSrc: Temporal Temporal Temporal Temporal    SpO2: 98% 97% 96% 95%   Weight:           General: Patient is awake and in no acute distress  Eyes: examination of optic disks not indicated at this time  CV: RRR    NEUROLOGICAL EXAM:     Mental status: Awake, alert and fully oriented, follows commands  Speech and language: The patient is able to name and repeat.  Mild dysarthria.  Cranial nerve exam: Pupils are equal, round and reactive to light bilaterally. Visual fields are full. Extraocular muscles are intact. Sensation in the face is intact to light touch. Face is symmetric. Hearing to finger rub equal. Palate elevates symmetrically. Shoulder shrug is full. Tongue is midline.  Motor exam: Strength is 5/5 in all extremities both distally and proximally. Tone is normal. No abnormal movements were seen on exam.  Sensory exam: No sensory deficits identified   Deep tendon reflexes:  2+ and symmetric. Toes down-going bilaterally.  Coordination: no ataxia   Gait: deferred as patient was actively being transported to dialysis    Objective Data:    Labs:  Lab Results   Component Value Date/Time    PROTHROMBTM 18.2 (H) 11/22/2019 10:53 AM    INR 1.47 (H) 11/22/2019 10:53 AM      Lab Results   Component Value Date/Time    WBC 7.8 11/22/2019 10:53 AM    RBC 2.61 (L) 11/22/2019 10:53 AM    HEMOGLOBIN 8.0 (L) 11/22/2019 10:53 AM    HEMATOCRIT 24.3 (L) 11/22/2019 10:53 AM    MCV 93.1 11/22/2019 10:53 AM    MCH 30.7 11/22/2019 10:53 AM    MCHC 32.9 (L) 11/22/2019 10:53 AM    MPV 10.3 11/22/2019 10:53 AM    NEUTSPOLYS 69.50 11/22/2019 10:53 AM    LYMPHOCYTES 15.80 (L) 11/22/2019 10:53 AM    MONOCYTES 13.30 11/22/2019 10:53 AM    EOSINOPHILS 0.40 11/22/2019 10:53 AM    BASOPHILS 0.40 11/22/2019 10:53 AM      Lab Results   Component Value Date/Time    SODIUM 141 11/23/2019 04:29 AM    POTASSIUM 3.7 11/23/2019 04:29 AM    CHLORIDE 103 11/23/2019 04:29 AM    CO2 29 11/23/2019 04:29 AM    GLUCOSE 92 11/23/2019 04:29 AM    BUN 22 11/23/2019 04:29 AM    CREATININE 3.65 (H)  11/23/2019 04:29 AM      Lab Results   Component Value Date/Time    CHOLSTRLTOT 74 (L) 11/23/2019 04:29 AM    LDL 40 11/23/2019 04:29 AM    HDL 15 (A) 11/23/2019 04:29 AM    TRIGLYCERIDE 97 11/23/2019 04:29 AM       Lab Results   Component Value Date/Time    ALKPHOSPHAT 126 (H) 11/22/2019 10:53 AM    ASTSGOT 15 11/22/2019 10:53 AM    ALTSGPT 14 11/22/2019 10:53 AM    TBILIRUBIN 0.6 11/22/2019 10:53 AM        Imaging/Testing:  MR-BRAIN-W/O   Final Result      1.  No acute abnormality.   2.  Mild cerebral atrophy.      EC-ECHOCARDIOGRAM COMPLETE W/O CONT   Final Result      DX-CHEST-PORTABLE (1 VIEW)   Final Result      Left lung atelectasis/fibrosis and blunted costophrenic angle similar to recent findings. No new infiltrates identified.      CT-CTA HEAD WITH & W/O-POST PROCESS   Final Result      CT angiogram of the [place] within normal limits. No large vessel occlusions identified.   No acute intracranial hemorrhage.      CT-CTA NECK WITH & W/O-POST PROCESSING   Final Result      1.  Mild bilateral common carotid artery stenosis with noncalcified plaque present, less than 50% luminal narrowing.   2.  No focal high-grade stenosis, dissection or occlusion of the cervical carotid or vertebral arteries.   3.  Small RIGHT apical lung nodule, nonspecific.      CT-CEREBRAL PERFUSION ANALYSIS   Final Result      1.  Cerebral blood flow less than 30% likely representing completed infarct = 0 mL.      2.  T Max more than 6 seconds likely representing combination of completed infarct and ischemia = 0 mL.      3.  Mismatched volume likely representing ischemic brain/penumbra = None      4.  Please note that the cerebral perfusion was performed on the limited brain tissue around the basal ganglia region. Infarct/ischemia outside the CT perfusion sections can be missed in this study.      CT-HEAD W/O   Final Result      1.  Cerebral atrophy.      2.  White matter lucencies most consistent with small vessel ischemic  change versus demyelination or gliosis.      3.  Otherwise, Head CT without contrast with no acute findings. No evidence of acute cerebral hemorrhage or mass lesion.        Assessment and Plan:    Luis Sepulveda is a 73 y.o. male with relevant history of hypertension, diabetes, atrial fibrillation on Eliquis, recent and STEMI November 2019, and CKD on dialysis who presented 11/22/2019 with dysarthria.  MRI brain is negative for acute infarct.  Most likely etiology of the patient's dysarthria is toxic metabolic derangement versus recrudescence of prior infarct.    Plan:  -Continue anticoagulation and Lipitor  -Toxic metabolic work-up  -Dialysis and remainder of management per primary team    No further recommendations or further studies from a neurological standpoint at this time. Please re-consult if you have further questions or there is a change in status.    The evaluation of the patient, and recommended management, was discussed with the resident staff. I have performed a physical exam and reviewed and updated ROS and Plan today (11/23/2019). In review of yesterday's note (11/22/2019), there are no changes except as documented above.    Dat Portillo MD  Director, Comprehensive Stroke Center, [facility]  Neurohospitalist, [facility]  Clinical  of Neurology, [employer]  t) [phone] (f) [phone]

## 2019-11-23 NOTE — PROGRESS NOTES
[facility] Nephrology Daily Progress Note    Date of Service  11/23/2019    Chief Complaint  73 y.o. male admitted 11/22/2019 with new ESRD presented after being discharged earlier in the week for eval of CVA. Pt reprots difficulty with fine motor skill and word finding. He now states that his symtpoms are improving, but he is mildly delayed from my prior interactions    Interval Problem Update  11/13/19--No events, tunneled HD catheter placed yest by IR and HD initiated, BP low during HD and no fluid removed, pt feels nauseous and weak today, BP stable, acidosis improved, serum K low, BUN improved with HD, getting PRBC transfusion today  11/14/19--Hd treatment #3 today, he reports occasional nausea, and sob.  He reports tolerating HD well and believes it is helping with SOB.    11/15/19--No HD today, Plan for cardiac cath tomorrow. Pt sitting up at bedside, he is feeling well today.  Tolerated HD well yesterday.  Denies pain, n/v, sob.  He is NPO for cardiac cath.    11/16/19--No events, cardiac cath done today and recommendation for medical management and no stents placed, BP stable, seen on dialysis this afternoon  11/17/19--No events, BP stable, tolerated HD yesterday, no new complaints, remains on maintenance IVF's  11/18/19- Pt reports increased mobility from prior, no chest pain  11/22/19 Returns to hospital for eval of DVA, HD yesterday  11/23/19 No new overnight renal events. HD today.     Review of Systems  Review of Systems   Constitutional: Negative.    HENT: Negative.    Eyes: Negative.    Respiratory: Negative.    Cardiovascular: Negative.    Gastrointestinal: Negative.    Genitourinary: Negative.    Musculoskeletal: Negative.    Skin: Negative.    Neurological: Positive for speech change and weakness.   Endo/Heme/Allergies: Bruises/bleeds easily.        Physical Exam  Temp:  [36.6 °C (97.9 °F)-37.6 °C (99.7 °F)] 37.2 °C (98.9 °F)  Pulse:  [71-86] 76  Resp:  [16-18] 16  BP: (110-162)/(47-89)  140/48  SpO2:  [94 %-100 %] 95 %    Physical Exam  Vitals signs and nursing note reviewed.   Constitutional:       Appearance: Normal appearance.   HENT:      Head: Normocephalic and atraumatic.      Mouth/Throat:      Mouth: Mucous membranes are dry.   Eyes:      Conjunctiva/sclera: Conjunctivae normal.      Pupils: Pupils are equal, round, and reactive to light.   Neck:      Musculoskeletal: Normal range of motion and neck supple.      Comments: RIJ tunnel cath  Cardiovascular:      Rate and Rhythm: Normal rate and regular rhythm.      Pulses: Normal pulses.      Heart sounds: Normal heart sounds.   Pulmonary:      Effort: Pulmonary effort is normal.      Breath sounds: Normal breath sounds.   Abdominal:      General: Abdomen is flat.      Palpations: Abdomen is soft.   Musculoskeletal: Normal range of motion.         General: No swelling.   Skin:     General: Skin is warm and dry.      Comments: ehcymosis   Neurological:      General: No focal deficit present.      Mental Status: He is alert and oriented to person, place, and time.      Comments: Pt has decreased speech fluidity from prior   Psychiatric:         Mood and Affect: Mood normal.         Thought Content: Thought content normal.         Fluids    Intake/Output Summary (Last 24 hours) at 11/23/2019 0854  Last data filed at 11/23/2019 0750  Gross per 24 hour   Intake 360 ml   Output --   Net 360 ml       Laboratory  Recent Labs     11/22/19  1053   WBC 7.8   RBC 2.61*   HEMOGLOBIN 8.0*   HEMATOCRIT 24.3*   MCV 93.1   MCH 30.7   MCHC 32.9*   RDW 44.3   PLATELETCT 224   MPV 10.3     Recent Labs     11/22/19  1053 11/23/19  0429   SODIUM 139 141   POTASSIUM 3.8 3.7   CHLORIDE 101 103   CO2 29 29   GLUCOSE 105* 92   BUN 17 22   CREATININE 2.91* 3.65*   CALCIUM 8.0* 7.5*     Recent Labs     11/22/19  1053   APTT 33.5   INR 1.47*     No results for input(s): NTPROBNP in the last 72 hours.  Recent Labs     11/23/19  0429   TRIGLYCERIDE 97   HDL 15*   LDL 40        Imaging  1. New ESRD, HD TTS  --R Chest PC placed 11/12 and HD initiated   --[facility]  2. CAD cath completed 11/16-medical mangement  4. HTN--stable  5. DM II--per primary svc  6. Renal Osteodystrophy/CKD Bone Mineral Disorder--phos elevated  --calcium acetate started   --  7. Hyperthyroidism--on methimazone  8. Anemia--secondary to CKD  --No need for IV Iron  -- PRBC transfusion 11/13  9. Secondary Hyperparathyroidism  10. 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 Pruritis--likely related to hyperphosphatemia  14. Moderate Protein-Calorie Malnutrition    Plan  HD today  Renal dose meds  EPO with HD for anemia      Assessment/Plan  No new Assessment & Plan notes have been filed under this hospital service since the last note was generated.  Service: Nephrology

## 2019-11-23 NOTE — ASSESSMENT & PLAN NOTE
The patient is recent status post cardiac cath  Continue statin and antiplatelet therapy  Continue beta-blockade  Monitor on telemetry  Check cardiac echo

## 2019-11-23 NOTE — PROGRESS NOTES
Pt arrived to unit. Ambulated from gurney to bathroom then to bed. AAOx4. VSS. Tele monitor in place. NIHSS completed. Pt made NPO and SLP eval placed. Pt updated on POC. Fall precautions in place. Will continue hourly rounding.

## 2019-11-23 NOTE — PROGRESS NOTES
[facility] Medicine Daily Progress Note    Date of Service  11/23/2019    Chief Complaint  73 y.o. male admitted 11/22/2019 with slurred speech      Interval Problem Update  Dialysis today    No neuro complaints    Afebrile    No signs or infection    Mri brain no acute findings    Consultants/Specialty  Dr macias neuro    Code Status  full    Disposition  home    Review of Systems  Review of Systems   Constitutional: Negative for chills, fever and weight loss.   HENT: Negative for ear pain, hearing loss and tinnitus.    Eyes: Negative for blurred vision and double vision.   Respiratory: Negative for cough.    Cardiovascular: Negative for chest pain and palpitations.   Gastrointestinal: Negative for heartburn, nausea and vomiting.   Genitourinary: Negative for dysuria, frequency and urgency.   Musculoskeletal: Negative for myalgias.   Neurological: Negative for dizziness and headaches.   All other systems reviewed and are negative.       Physical Exam  Temp:  [36.6 °C (97.9 °F)-37.2 °C (98.9 °F)] 37.2 °C (98.9 °F)  Pulse:  [71-86] 76  Resp:  [16-18] 16  BP: (110-162)/(47-89) 140/48  SpO2:  [94 %-98 %] 95 %    Physical Exam  Constitutional:       General: He is not in acute distress.     Appearance: Normal appearance. He is not ill-appearing.   HENT:      Head: Normocephalic and atraumatic.      Nose: No congestion.      Mouth/Throat:      Mouth: Mucous membranes are moist.   Eyes:      General: No scleral icterus.        Left eye: No discharge.      Extraocular Movements: Extraocular movements intact.      Pupils: Pupils are equal, round, and reactive to light.   Neck:      Musculoskeletal: Normal range of motion and neck supple.   Cardiovascular:      Rate and Rhythm: Normal rate.      Pulses: Normal pulses.      Heart sounds: No murmur.   Pulmonary:      Effort: Pulmonary effort is normal. No respiratory distress.      Breath sounds: Normal breath sounds. No wheezing.   Abdominal:      General: Abdomen is flat.  Bowel sounds are normal. There is no distension.      Palpations: Abdomen is soft. There is no mass.      Tenderness: There is no tenderness.      Hernia: No hernia is present.   Musculoskeletal:         General: No swelling.      Right lower leg: No edema.      Left lower leg: No edema.   Skin:     General: Skin is warm.      Capillary Refill: Capillary refill takes 2 to 3 seconds.      Coloration: Skin is not jaundiced.   Neurological:      General: No focal deficit present.      Mental Status: He is alert and oriented to person, place, and time.      Cranial Nerves: No cranial nerve deficit.      Sensory: No sensory deficit.      Motor: No weakness.   Psychiatric:         Mood and Affect: Mood normal.         Fluids    Intake/Output Summary (Last 24 hours) at 11/23/2019 1249  Last data filed at 11/23/2019 0750  Gross per 24 hour   Intake 360 ml   Output --   Net 360 ml       Laboratory  Recent Labs     11/22/19  1053   WBC 7.8   RBC 2.61*   HEMOGLOBIN 8.0*   HEMATOCRIT 24.3*   MCV 93.1   MCH 30.7   MCHC 32.9*   RDW 44.3   PLATELETCT 224   MPV 10.3     Recent Labs     11/22/19  1053 11/23/19  0429   SODIUM 139 141   POTASSIUM 3.8 3.7   CHLORIDE 101 103   CO2 29 29   GLUCOSE 105* 92   BUN 17 22   CREATININE 2.91* 3.65*   CALCIUM 8.0* 7.5*     Recent Labs     11/22/19  1053   APTT 33.5   INR 1.47*         Recent Labs     11/23/19  0429   TRIGLYCERIDE 97   HDL 15*   LDL 40       Imaging  MR-BRAIN-W/O   Final Result      1.  No acute abnormality.   2.  Mild cerebral atrophy.      EC-ECHOCARDIOGRAM COMPLETE W/O CONT   Final Result      DX-CHEST-PORTABLE (1 VIEW)   Final Result      Left lung atelectasis/fibrosis and blunted costophrenic angle similar to recent findings. No new infiltrates identified.      CT-CTA HEAD WITH & W/O-POST PROCESS   Final Result      CT angiogram of the [place] within normal limits. No large vessel occlusions identified.   No acute intracranial hemorrhage.      CT-CTA NECK WITH &  W/O-POST PROCESSING   Final Result      1.  Mild bilateral common carotid artery stenosis with noncalcified plaque present, less than 50% luminal narrowing.   2.  No focal high-grade stenosis, dissection or occlusion of the cervical carotid or vertebral arteries.   3.  Small RIGHT apical lung nodule, nonspecific.      CT-CEREBRAL PERFUSION ANALYSIS   Final Result      1.  Cerebral blood flow less than 30% likely representing completed infarct = 0 mL.      2.  T Max more than 6 seconds likely representing combination of completed infarct and ischemia = 0 mL.      3.  Mismatched volume likely representing ischemic brain/penumbra = None      4.  Please note that the cerebral perfusion was performed on the limited brain tissue around the basal ganglia region. Infarct/ischemia outside the CT perfusion sections can be missed in this study.      CT-HEAD W/O   Final Result      1.  Cerebral atrophy.      2.  White matter lucencies most consistent with small vessel ischemic change versus demyelination or gliosis.      3.  Otherwise, Head CT without contrast with no acute findings. No evidence of acute cerebral hemorrhage or mass lesion.           Assessment/Plan  * Acute metabolic encephalopathy  Assessment & Plan  Resolved with dialysis    No evidence of stroke    End stage renal disease on dialysis (HCC)- (present on admission)  Assessment & Plan  On dialysis  Nephrology has been consulted  Patient has a tunneled cath in the right chest    Paroxysmal A-fib (HCC)- (present on admission)  Assessment & Plan  Anticoagulated on apixaban  Rate control with beta-blockade    Diabetes mellitus type 2, controlled, with complications (HCC)- (present on admission)  Assessment & Plan  Last A1c was 5.3.  Patient is maintained on NPH insulin in the mornings, and Lantus in the evenings.  Diabetic diet  Continue Lantus  Sliding scale    Dyslipidemia- (present on admission)  Assessment & Plan  Continue statin  Check lipid panel    Essential  hypertension- (present on admission)  Assessment & Plan  Continue metoprolol     Coronary artery disease involving native coronary artery of native heart without angina pectoris- (present on admission)  Assessment & Plan  The patient is recent status post cardiac cath  Continue statin and antiplatelet therapy  Continue beta-blockade  Monitor on telemetry  Check cardiac echo       VTE prophylaxis: eliquis

## 2019-11-23 NOTE — CARE PLAN
Problem: Safety  Goal: Will remain free from injury  Outcome: PROGRESSING AS EXPECTED  Note:   Call light in reach; bed in low/locked position      Problem: Communication  Goal: The ability to communicate needs accurately and effectively will improve      Outcome: PROGRESSING AS EXPECTED  Note:   Pt is no longer showing signs of dysarthria or difficulty word finding; able to make needs known

## 2019-11-23 NOTE — H&P
Hospital Medicine History & Physical Note    Date of Service  11/22/2019    Primary Care Physician  Patito Edgar M.D.    Consultants  Neurology    Code Status  Full    Chief Complaint  Difficulties with speech and balance    History of Presenting Illness  73 y.o. male who was admitted here from November 11 to November 18 of this year. He was taken care of by the [facility] internal medicine team.  He was diagnosed with an NSTEMI and went to the Cath Lab on November 16, there were no interventions.    The patient had gone home and was feeling well until this morning.  He thinks that around 9 AM when he got out of bed he started to have difficulty with his balance.  At some point he was speaking to somebody for the first time of the day and he notes that he was having Troubles speaking.  He denies any difficulty understanding.  He denies any problems with his vision, focal weakness, or sensation changes.  He was brought to the emergency room where code stroke was called.  Given the fact that he is on apixaban, he was felt not to be a TPA candidate.  Stat CTA of the head and neck were done, these did not show any large vessel occlusions therefore he is not an IR candidate either.  During my examination the patient feels like his symptoms are better, however he still very frustrated because they are not entirely resolved.    Review of Systems  Review of Systems   Constitutional: Negative for chills and fever.   HENT: Negative for nosebleeds and sore throat.    Eyes: Negative for blurred vision and double vision.   Respiratory: Negative for cough and shortness of breath.    Cardiovascular: Negative for chest pain, palpitations and leg swelling.   Gastrointestinal: Negative for abdominal pain, diarrhea, nausea and vomiting.   Genitourinary: Negative for dysuria and urgency.   Musculoskeletal: Negative for back pain.   Skin: Negative for rash.   Neurological: Positive for dizziness and speech change. Negative for sensory  change, focal weakness, loss of consciousness, weakness and headaches.       Past Medical History   has a past medical history of CAD (coronary artery disease), Chronic kidney disease (CKD), stage V (HCC), Diabetes, Hypertension, Osteoarthritis, and Peripheral neuropathy.    Surgical History   has a past surgical history that includes other cardiac surgery; appendectomy; and other orthopedic surgery.     Family History  family history includes Alcohol/Drug in his father; Diabetes in his brother; Heart Disease in his mother; Thyroid in his son.     Social History   reports that he quit smoking about 5 years ago. He has a 55.00 pack-year smoking history. He has never used smokeless tobacco. He reports that he does not drink alcohol or use drugs.    Allergies  No Known Allergies    Medications  Prior to Admission Medications   Prescriptions Last Dose Informant Patient Reported? Taking?   acetaminophen (TYLENOL) 500 MG Tab PRN at PRN Patient Yes No   Sig: Take 500-1,000 mg by mouth every 6 hours as needed for Moderate Pain.   apixaban (ELIQUIS) 5mg Tab 11/22/2019 at AM Patient Yes Yes   Sig: Take 5 mg by mouth 2 Times a Day.   atorvastatin (LIPITOR) 80 MG tablet 11/21/2019 at PM Patient No No   Sig: Take 1 Tab by mouth every evening.   calcitRIOL (ROCALTROL) 0.25 MCG Cap 11/20/2019 at AM Patient No No   Sig: Take 1 Cap by mouth every Monday, Wednesday, and Friday.   calcium acetate (PHOS-LO) 667 MG Tab tablet NOT YET STARTED at NOT YET STARTED Patient No No   Sig: Take 1 Tab by mouth 3 times a day before meals.   clopidogrel (PLAVIX) 75 MG Tab 11/21/2019 at PM Patient Yes Yes   Sig: Take 75 mg by mouth every day.   furosemide (LASIX) 40 MG Tab PRN at PRN Patient Yes No   Sig: Take 40 mg by mouth 1 time daily as needed. Indications: Edema   insulin glargine (LANTUS SOLOSTAR) 100 UNIT/ML Solution Pen-injector injection 11/21/2019 at PM Patient No No   Sig: Inject 5 Units as instructed every evening for 60 days.    methimazole (TAPAZOLE) 5 MG Tab 11/22/2019 at AM Patient Yes Yes   Sig: Take 5 mg by mouth every day.   metoprolol (LOPRESSOR) 100 MG Tab 11/22/2019 at AM Patient Yes Yes   Sig: Take 100 mg by mouth 2 times a day.   nitroglycerin (NITROSTAT) 0.4 MG SL Tab PRN at PRN Patient No No   Sig: Place 1 Tab under tongue as needed for Chest Pain (donot take if Blood pressure is less than 100/60 mm of Hg) for up to 10 doses.   pantoprazole (PROTONIX) 40 MG Tablet Delayed Response PRN at PRN Patient Yes No   Sig: Take 40 mg by mouth 1 time daily as needed.      Facility-Administered Medications: None       Physical Exam  Temp:  [37.1 °C (98.8 °F)-37.6 °C (99.7 °F)] 37.1 °C (98.8 °F)  Pulse:  [71-86] 86  Resp:  [16-18] 18  BP: (117-162)/(48-89) 117/89  SpO2:  [94 %-100 %] 98 %    Physical Exam  Constitutional:       General: He is not in acute distress.     Appearance: He is well-developed. He is not diaphoretic.   HENT:      Head: Normocephalic and atraumatic.   Eyes:      Conjunctiva/sclera: Conjunctivae normal.   Neck:      Vascular: No JVD.   Cardiovascular:      Rate and Rhythm: Normal rate.      Heart sounds: No murmur. No gallop.    Pulmonary:      Effort: Pulmonary effort is normal. No respiratory distress.      Breath sounds: No stridor. No wheezing or rales.   Abdominal:      Palpations: Abdomen is soft.      Tenderness: There is no tenderness. There is no guarding or rebound.   Musculoskeletal:         General: No swelling.      Right lower leg: No edema.      Left lower leg: No edema.   Skin:     General: Skin is warm and dry.      Findings: No rash.   Neurological:      Mental Status: He is alert and oriented to person, place, and time. Mental status is at baseline.      Cranial Nerves: Cranial nerves are intact.      Sensory: Sensation is intact.      Motor: Motor function is intact.      Coordination: Coordination is intact.      Comments: Cranial nerves are intact with the exception of speech which is on  occasion dysarthric, and clearly with some word finding difficulties.  He does not appear to have any receptive deficits however.   Psychiatric:         Thought Content: Thought content normal.         Laboratory:  Recent Labs     11/22/19  1053   WBC 7.8   RBC 2.61*   HEMOGLOBIN 8.0*   HEMATOCRIT 24.3*   MCV 93.1   MCH 30.7   MCHC 32.9*   RDW 44.3   PLATELETCT 224   MPV 10.3     Recent Labs     11/22/19  1053   SODIUM 139   POTASSIUM 3.8   CHLORIDE 101   CO2 29   GLUCOSE 105*   BUN 17   CREATININE 2.91*   CALCIUM 8.0*     Recent Labs     11/22/19  1053   ALTSGPT 14   ASTSGOT 15   ALKPHOSPHAT 126*   TBILIRUBIN 0.6   GLUCOSE 105*     Recent Labs     11/22/19  1053   APTT 33.5   INR 1.47*     No results for input(s): NTPROBNP in the last 72 hours.      Recent Labs     11/22/19  1053   TROPONINT 297*       Urinalysis:    No results found     Imaging:  EC-ECHOCARDIOGRAM COMPLETE W/O CONT   Final Result      DX-CHEST-PORTABLE (1 VIEW)   Final Result      Left lung atelectasis/fibrosis and blunted costophrenic angle similar to recent findings. No new infiltrates identified.      CT-CTA HEAD WITH & W/O-POST PROCESS   Final Result      CT angiogram of the [place] within normal limits. No large vessel occlusions identified.   No acute intracranial hemorrhage.      CT-CTA NECK WITH & W/O-POST PROCESSING   Final Result      1.  Mild bilateral common carotid artery stenosis with noncalcified plaque present, less than 50% luminal narrowing.   2.  No focal high-grade stenosis, dissection or occlusion of the cervical carotid or vertebral arteries.   3.  Small RIGHT apical lung nodule, nonspecific.      CT-CEREBRAL PERFUSION ANALYSIS   Final Result      1.  Cerebral blood flow less than 30% likely representing completed infarct = 0 mL.      2.  T Max more than 6 seconds likely representing combination of completed infarct and ischemia = 0 mL.      3.  Mismatched volume likely representing ischemic brain/penumbra = None       4.  Please note that the cerebral perfusion was performed on the limited brain tissue around the basal ganglia region. Infarct/ischemia outside the CT perfusion sections can be missed in this study.      CT-HEAD W/O   Final Result      1.  Cerebral atrophy.      2.  White matter lucencies most consistent with small vessel ischemic change versus demyelination or gliosis.      3.  Otherwise, Head CT without contrast with no acute findings. No evidence of acute cerebral hemorrhage or mass lesion.      MR-BRAIN-W/O    (Results Pending)         Assessment/Plan:  I anticipate this patient will require at least two midnights for appropriate medical management, necessitating inpatient admission.    End stage renal disease on dialysis (HCC)- (present on admission)  Assessment & Plan  On dialysis  Nephrology has been consulted  Patient has a tunneled cath in the right chest    Paroxysmal A-fib (HCC)- (present on admission)  Assessment & Plan  Anticoagulated on apixaban  Rate control with beta-blockade    Dysarthria  Assessment & Plan  Clinically the patient would appear to have suffered a stroke though we have not confirmed this diagnosis as of yet.  Not a TPA candidate as he is on apixaban  No large vessel occlusion noted on CTA's  Neurology consulted  Continue current antiplatelet and antithrombotic therapy pending recommendations from neurology  Check cardiac echo  Continue high-dose statin  Permissive hyper tension for the short-term  N.p.o. pending speech evaluation    Diabetes mellitus type 2, controlled, with complications ([facility])- (present on admission)  Assessment & Plan  Last A1c was 5.3.  Patient is maintained on NPH insulin in the mornings, and Lantus in the evenings.  Diabetic diet  Continue Lantus  Sliding scale    Dyslipidemia- (present on admission)  Assessment & Plan  Continue statin  Check lipid panel    Essential hypertension- (present on admission)  Assessment & Plan  Continue metoprolol with parameters  after permissive hypertensive window    Coronary artery disease involving native coronary artery of native heart without angina pectoris- (present on admission)  Assessment & Plan  The patient is recent status post cardiac cath  Continue statin and antiplatelet therapy  Continue beta-blockade  Monitor on telemetry  Check cardiac echo      VTE prophylaxis: Apixaban

## 2019-11-23 NOTE — PROGRESS NOTES
Monitor summary: SR 69-82, SC 0.16, QRS 0.12, QT 0.44, with rare PACs and PVCs per strip from monitor room.

## 2019-11-23 NOTE — CARE PLAN
Problem: Safety  Goal: Will remain free from injury  Note:   Bed locked and in lowest position. Call light and personal belongings in reach. Bed alarm in place. Hourly rounding.       Problem: Venous Thromboembolism (VTW)/Deep Vein Thrombosis (DVT) Prevention:  Goal: Patient will participate in Venous Thrombosis (VTE)/Deep Vein Thrombosis (DVT)Prevention Measures  Note:   SCDs in place. Apixaban for DVT prophylaxis. Encouraging ambulation as tolerated.      Problem: Acute Care of the Stroke Patient  Goal: Optimal Outcome for the Stroke patient  Intervention: NIHSS completed and documented  Note:   NIHSS completed during shift change with JAN Hernandez.   Intervention: PT/OT/SLP needs: LIP must assess the need for Rehab Services. If none needed, LIP must document reason.  Note:   PT and OT evals pending. Will reach out to complete this shift.

## 2019-11-23 NOTE — PROGRESS NOTES
2 RN skin check completed with JAN Beasley. Bilat feet dry & flaky. No s/s of skin breakdown noted.

## 2019-11-23 NOTE — ASSESSMENT & PLAN NOTE
Last A1c was 5.3.  Patient is maintained on NPH insulin in the mornings, and Lantus in the evenings.  Diabetic diet  Continue Lantus  Sliding scale

## 2019-11-24 VITALS
BODY MASS INDEX: 26.52 KG/M2 | OXYGEN SATURATION: 100 % | TEMPERATURE: 98.7 F | SYSTOLIC BLOOD PRESSURE: 109 MMHG | DIASTOLIC BLOOD PRESSURE: 43 MMHG | RESPIRATION RATE: 16 BRPM | WEIGHT: 159.39 LBS | HEART RATE: 86 BPM

## 2019-11-24 LAB — GLUCOSE BLD-MCNC: 80 MG/DL (ref 65–99)

## 2019-11-24 PROCEDURE — A9270 NON-COVERED ITEM OR SERVICE: HCPCS | Performed by: HOSPITALIST

## 2019-11-24 PROCEDURE — 82962 GLUCOSE BLOOD TEST: CPT

## 2019-11-24 PROCEDURE — 700102 HCHG RX REV CODE 250 W/ 637 OVERRIDE(OP): Performed by: HOSPITALIST

## 2019-11-24 PROCEDURE — 99239 HOSP IP/OBS DSCHRG MGMT >30: CPT | Performed by: HOSPITALIST

## 2019-11-24 RX ORDER — CLOPIDOGREL BISULFATE 75 MG/1
75 TABLET ORAL DAILY
Qty: 7 TAB | Refills: 0 | Status: SHIPPED | OUTPATIENT
Start: 2019-11-24 | End: 2019-12-01

## 2019-11-24 RX ORDER — NITROGLYCERIN 0.4 MG/1
0.4 TABLET SUBLINGUAL PRN
Qty: 10 TAB | Refills: 0 | Status: ON HOLD | OUTPATIENT
Start: 2019-11-24 | End: 2019-12-01

## 2019-11-24 RX ORDER — PANTOPRAZOLE SODIUM 40 MG/1
40 TABLET, DELAYED RELEASE ORAL DAILY
Qty: 7 TAB | Refills: 0 | Status: SHIPPED | OUTPATIENT
Start: 2019-11-24 | End: 2019-12-01

## 2019-11-24 RX ORDER — FUROSEMIDE 40 MG/1
40 TABLET ORAL
Qty: 7 TAB | Refills: 0 | Status: ON HOLD | OUTPATIENT
Start: 2019-11-24 | End: 2019-12-01

## 2019-11-24 RX ORDER — CALCIUM ACETATE 667 MG/1
667 TABLET ORAL
Qty: 21 TAB | Refills: 0 | Status: SHIPPED | OUTPATIENT
Start: 2019-11-24 | End: 2019-12-01

## 2019-11-24 RX ORDER — CALCITRIOL 0.25 UG/1
0.25 CAPSULE, LIQUID FILLED ORAL
Qty: 3 CAP | Refills: 0 | Status: SHIPPED | OUTPATIENT
Start: 2019-11-25 | End: 2019-12-02

## 2019-11-24 RX ORDER — METOPROLOL TARTRATE 100 MG/1
100 TABLET ORAL 2 TIMES DAILY
Qty: 14 TAB | Refills: 0 | Status: ON HOLD | OUTPATIENT
Start: 2019-11-24 | End: 2019-12-01

## 2019-11-24 RX ORDER — ATORVASTATIN CALCIUM 80 MG/1
80 TABLET, FILM COATED ORAL EVERY EVENING
Qty: 7 TAB | Refills: 0 | Status: ON HOLD | OUTPATIENT
Start: 2019-11-24 | End: 2019-12-01

## 2019-11-24 RX ORDER — DIPHENHYDRAMINE HCL 25 MG
25 TABLET ORAL ONCE
Status: COMPLETED | OUTPATIENT
Start: 2019-11-24 | End: 2019-11-24

## 2019-11-24 RX ORDER — METHIMAZOLE 5 MG/1
5 TABLET ORAL DAILY
Qty: 7 TAB | Refills: 0 | Status: SHIPPED | OUTPATIENT
Start: 2019-11-24 | End: 2019-12-01

## 2019-11-24 RX ADMIN — METHIMAZOLE 5 MG: 5 TABLET ORAL at 05:25

## 2019-11-24 RX ADMIN — Medication 667 MG: at 08:33

## 2019-11-24 RX ADMIN — DIPHENHYDRAMINE HCL 25 MG: 25 TABLET ORAL at 10:41

## 2019-11-24 RX ADMIN — APIXABAN 5 MG: 5 TABLET, FILM COATED ORAL at 05:26

## 2019-11-24 ASSESSMENT — ENCOUNTER SYMPTOMS
WEAKNESS: 1
GASTROINTESTINAL NEGATIVE: 1
EYES NEGATIVE: 1
RESPIRATORY NEGATIVE: 1
CARDIOVASCULAR NEGATIVE: 1
BRUISES/BLEEDS EASILY: 1
CONSTITUTIONAL NEGATIVE: 1
SPEECH CHANGE: 1
MUSCULOSKELETAL NEGATIVE: 1

## 2019-11-24 NOTE — RESPIRATORY CARE
COPD EDUCATION by COPD CLINICAL EDUCATOR  11/24/2019 at 6:21 AM by Yecenia Aranda     Patient reviewed by COPD education team. Patient does not have a history or diagnosis of COPD and is a non-smoker, therefore does not qualify for the COPD program.

## 2019-11-24 NOTE — CARE PLAN
Problem: Communication  Goal: The ability to communicate needs accurately and effectively will improve  Outcome: PROGRESSING AS EXPECTED     Problem: Safety  Goal: Will remain free from injury  Outcome: PROGRESSING AS EXPECTED  Goal: Will remain free from falls  Outcome: PROGRESSING AS EXPECTED     Problem: Infection  Goal: Will remain free from infection  Outcome: PROGRESSING AS EXPECTED     Problem: Venous Thromboembolism (VTW)/Deep Vein Thrombosis (DVT) Prevention:  Goal: Patient will participate in Venous Thrombosis (VTE)/Deep Vein Thrombosis (DVT)Prevention Measures  Outcome: PROGRESSING AS EXPECTED     Problem: Bowel/Gastric:  Goal: Normal bowel function is maintained or improved  Outcome: PROGRESSING AS EXPECTED  Goal: Will not experience complications related to bowel motility  Outcome: PROGRESSING AS EXPECTED     Problem: Knowledge Deficit  Goal: Knowledge of disease process/condition, treatment plan, diagnostic tests, and medications will improve  Outcome: PROGRESSING AS EXPECTED  Goal: Knowledge of the prescribed therapeutic regimen will improve  Outcome: PROGRESSING AS EXPECTED     Problem: Discharge Barriers/Planning  Goal: Patient's continuum of care needs will be met  Outcome: PROGRESSING AS EXPECTED     Problem: Respiratory:  Goal: Respiratory status will improve  Outcome: PROGRESSING AS EXPECTED     Problem: Pain Management  Goal: Pain level will decrease to patient's comfort goal  Outcome: PROGRESSING AS EXPECTED

## 2019-11-24 NOTE — THERAPY
"Occupational [id number] Evaluation completed.   Functional Status:  SPV for ADLs and ADL transfers without AD  Plan of Care: Patient with no further skilled OT needs in the acute care setting at this time  Discharge Recommendations:  Equipment: Grab Bars. Post-acute therapy Recommend home health transitional care for continued occupational therapy services.     See \"Rehab Therapy-Acute\" Patient Summary Report for complete documentation.    Pt is 72yo male admitted with dysarthria and reported changes in balance and fine motor control. Pt presents to OT eval without functional deficits in ADLs however does report he feels below his baseline in regards to fluid speech and balance, no LOB noted throughout. Pt with new dx of ESRD requiring HD and reports increased generalized weakness. Pt will benefit from HHOT to address home environment safety/fall prevention, and strengthening to reduce fall risk and increase activity tolerance. Patient will not be actively followed by acute occupational therapy services. Patient would still benefit from post-acute HHOT for additional rehabilitation services to focus on home safety, IADLs, and community reintegration that is not available in the acute care setting.     "

## 2019-11-24 NOTE — DISCHARGE SUMMARY
Discharge Summary    CHIEF COMPLAINT ON ADMISSION  Chief Complaint   Patient presents with   • Slurred Speech       Reason for Admission  Possible Stroke     Admission Date  11/22/2019    CODE STATUS  Prior    HPI & HOSPITAL COURSE  73 y.o. male  pmx cad, ESRD on dialysis     The patient had gone home and was feeling well until this morning.  He thinks that around 9 AM when he got out of bed he started to have difficulty with his balance.  At some point he was speaking to somebody for the first time of the day and he notes that he was having Troubles speaking.  He denies any difficulty understanding.  He denies any problems with his vision, focal weakness, or sensation changes.  He was brought to the emergency room where code stroke was called.  Given the fact that he is on apixaban, he was felt not to be a TPA candidate.  Stat CTA of the head and neck were done, these did not show any large vessel occlusions therefore he is not an IR candidate either.      The patient was hospitalized patient was seen by neurology Dr. Portillo.  The neurologist did not believe the patient is having a stroke but thought this was related to encephalopathy.  His symptoms resolved after dialysis and he was stable for discharge with outpatient follow-up       Therefore, he is discharged in fair and stable condition to home with close outpatient follow-up.    The patient recovered much more quickly than anticipated on admission.    Discharge Date  11/24/2019    FOLLOW UP ITEMS POST DISCHARGE      DISCHARGE DIAGNOSES  Principal Problem:    Acute metabolic encephalopathy POA: Unknown  Active Problems:    End stage renal disease on dialysis (HCC) POA: Yes    Paroxysmal A-fib ([facility]) POA: Yes    Essential hypertension POA: Yes    Dyslipidemia POA: Yes    Diabetes mellitus type 2, controlled, with complications ([facility]) POA: Yes    Coronary artery disease involving native coronary artery of native heart without angina pectoris POA: Yes  Resolved  Problems:    * No resolved hospital problems. *      FOLLOW UP  Future Appointments   Date Time Provider Department Center   12/2/2019 10:45 AM RENATA Callahan [facility] None     Patito Edgar M.D.  [address]  [address]  [place] [address]  [phone]            MEDICATIONS ON DISCHARGE     Medication List      CHANGE how you take these medications      Instructions   pantoprazole 40 MG Tbec  What changed:    · when to take this  · reasons to take this  Commonly known as:  PROTONIX   Take 1 Tab by mouth every day for 7 days.  Dose:  40 mg        CONTINUE taking these medications      Instructions   apixaban 5mg Tabs  Commonly known as:  ELIQUIS   Take 1 Tab by mouth 2 Times a Day for 7 days.  Dose:  5 mg     atorvastatin 80 MG tablet  Commonly known as:  LIPITOR   Take 1 Tab by mouth every evening for 7 days.  Dose:  80 mg     calcitRIOL 0.25 MCG Caps  Commonly known as:  ROCALTROL   Take 1 Cap by mouth every Monday, Wednesday, and Friday for 7 days.  Dose:  0.25 mcg     calcium acetate 667 MG Tabs tablet  Commonly known as:  PHOS-LO   Take 1 Tab by mouth 3 times a day before meals for 7 days.  Dose:  667 mg     clopidogrel 75 MG Tabs  Commonly known as:  PLAVIX   Take 1 Tab by mouth every day for 7 days.  Dose:  75 mg     furosemide 40 MG Tabs  Commonly known as:  LASIX   Take 1 Tab by mouth 1 time daily as needed for up to 7 days. Indications: Edema  Dose:  40 mg     insulin glargine 100 UNIT/ML Sopn injection  Commonly known as:  LANTUS SOLOSTAR   Inject 5 Units as instructed every evening for 60 days.  Dose:  5 Units     methimazole 5 MG Tabs  Commonly known as:  TAPAZOLE   Take 1 Tab by mouth every day for 7 days.  Dose:  5 mg     metoprolol 100 MG Tabs  Commonly known as:  LOPRESSOR   Take 1 Tab by mouth 2 times a day for 7 days.  Dose:  100 mg     nitroglycerin 0.4 MG Subl  Commonly known as:  NITROSTAT   Place 1 Tab under tongue as needed for Chest Pain (donot take if Blood pressure is  less than 100/60 mm of Hg) for up to 10 doses.  Dose:  0.4 mg        STOP taking these medications    acetaminophen 500 MG Tabs  Commonly known as:  TYLENOL            Allergies  No Known Allergies    DIET  No orders of the defined types were placed in this encounter.      ACTIVITY  As tolerated.  Weight bearing as tolerated    CONSULTATIONS  Dr macias neuro    PROCEDURES  Nov 23, 2019  8:20 AM   Imaging Result Status     Status: Final result (Exam End: 11/22/2019  8:11 PM)   Imaging Previous Results     Open Hard Copy Result Report (Order #[id number] - MR-BRAIN-W/O)   Narrative & Impression        11/22/2019 7:24 PM     HISTORY/REASON FOR EXAM:  Stroke, follow up; Okay for patient to be off telemetry monitoring for MRI.        TECHNIQUE/EXAM DESCRIPTION:  MRI of the brain without contrast.     T1 sagittal, T2 fast spin-echo axial, T1 coronal, FLAIR coronal, diffusion-weighted and apparent diffusion coefficient (ADC map) axial images were obtained of the whole brain.     The study was performed on a [employer] 1.5 Gayle MRI scanner.     COMPARISON:  None.     FINDINGS: There is no acute infarct. There is no acute or chronic parenchymal hemorrhage. Mild cerebral volume loss is seen. There is no intra-axial space-occupying lesion.     The ventricles, cortical sulci and the basal cisterns are prominent consistent with mild cerebral atrophy. There is no extra-axial fluid collection, hemorrhage or mass.     The skull bones are unremarkable. There is mucosal thickening in the left maxillary sinus. The extracranial soft tissue including orbits appear grossly normal.        IMPRESSION:     1.  No acute abnormality.  2.  Mild cerebral atrophy.   IR Documentation Timeline (11/25/2019 10:28:58 to 11/25/2019 10:28:58)     Reading Provider Reading Date   Sal Ames M.D. Nov 23, 2019   Signing Provider Signing Date Signing Time   Sal Ames M.D. Nov 23, 2019  8:17 AM   Order Detail Report     MR-BRAIN-W/O (Order  #[id number]) on 11/22/19   Order-Level Documents:     There are no order-level documents.   Encounter-Level Documents:     There are no encounter-level documents.   Order-Level Documents for Parent Order:     There are no order-level documents    LABORATORY  Lab Results   Component Value Date    SODIUM 138 11/23/2019    POTASSIUM 3.4 (L) 11/23/2019    CHLORIDE 102 11/23/2019    CO2 30 11/23/2019    GLUCOSE 121 (H) 11/23/2019    BUN 13 11/23/2019    CREATININE 2.54 (H) 11/23/2019        Lab Results   Component Value Date    WBC 5.7 11/23/2019    HEMOGLOBIN 8.0 (L) 11/23/2019    HEMATOCRIT 24.4 (L) 11/23/2019    PLATELETCT 206 11/23/2019        Total time of the discharge process exceeds 38 minutes.

## 2019-11-24 NOTE — PROGRESS NOTES
Monitor summary: SR 68-91, AL 0.20, QRS 0.12, QT 0.44, with rare PACs, rare PVCs and 8 beats of v-tach per strip from monitor room.

## 2019-11-24 NOTE — CARE PLAN
Problem: Venous Thromboembolism (VTW)/Deep Vein Thrombosis (DVT) Prevention:  Goal: Patient will participate in Venous Thrombosis (VTE)/Deep Vein Thrombosis (DVT)Prevention Measures  Outcome: PROGRESSING AS EXPECTED  Note:   SCDs on; pt frequently ambulates      Problem: Pain Management  Goal: Pain level will decrease to patient's comfort goal  Outcome: PROGRESSING AS EXPECTED  Note:   No complaints of pain this shift

## 2019-11-24 NOTE — PROGRESS NOTES
Patient discharged home with no services, medication education provided, follow up appointment reviewed.  Patient transported home by avi.

## 2019-11-24 NOTE — DISCHARGE PLANNING
F/U for Rehab. Current documentation does not reflect 2/3 therapy need meeting CMS criteria for IRF level care. Pt is supervised for functional ADL's with no further skilled OT need in acute setting. Pt is on regular/thin liquid diet with no further speech therapy needs after d/c from hospital. Physiatry consult denied per protocol. Please re-consult if there are interval changes. Thank you for the referral.

## 2019-11-24 NOTE — DISCHARGE INSTRUCTIONS
Discharge Instructions    Discharged to home by car with relative. Discharged via wheelchair, hospital escort: Yes.  Special equipment needed: Not Applicable    Be sure to schedule a follow-up appointment with your primary care doctor or any specialists as instructed.     Discharge Plan:   Influenza Vaccine Indication: Not indicated: Previously immunized this influenza season and > 8 years of age    I understand that a diet low in cholesterol, fat, and sodium is recommended for good health. Unless I have been given specific instructions below for another diet, I accept this instruction as my diet prescription.   Other diet: renal diet, phoslo tablet before meals    Special Instructions: None    · Is patient discharged on Warfarin / Coumadin?   No     Depression / Suicide Risk    As you are discharged from this [facility] facility, it is important to learn how to keep safe from harming yourself.    Recognize the warning signs:  · Abrupt changes in personality, positive or negative- including increase in energy   · Giving away possessions  · Change in eating patterns- significant weight changes-  positive or negative  · Change in sleeping patterns- unable to sleep or sleeping all the time   · Unwillingness or inability to communicate  · Depression  · Unusual sadness, discouragement and loneliness  · Talk of wanting to die  · Neglect of personal appearance   · Rebelliousness- reckless behavior  · Withdrawal from people/activities they love  · Confusion- inability to concentrate     If you or a loved one observes any of these behaviors or has concerns about self-harm, here's what you can do:  · Talk about it- your feelings and reasons for harming yourself  · Remove any means that you might use to hurt yourself (examples: pills, rope, extension cords, firearm)  · Get professional help from the community (Mental Health, Substance Abuse, psychological counseling)  · Do not be alone:Call your Safe Contact- someone whom  you trust who will be there for you.  · Call your local CRISIS HOTLINE 203-5429 or 787-265-1278  · Call your local Children's Mobile Crisis Response Team [place] (668) 287-6813 or www.PlaceFull  · Call the toll free National Suicide Prevention Hotlines   · National Suicide Prevention Lifeline 471-126-DOAL (2190)  · National [employer] Line Network 800-SUICIDE (462-7338)

## 2019-11-24 NOTE — PROGRESS NOTES
Patient had 8 beats of Vtach; non symptomatic at the time. MD updated; orders received for stat BMP and Magnesium.

## 2019-11-24 NOTE — PROGRESS NOTES
[facility] Nephrology Daily Progress Note    Date of Service  11/24/2019    Chief Complaint  73 y.o. male admitted 11/22/2019 with new ESRD presented after being discharged earlier in the week for eval of CVA. Pt reprots difficulty with fine motor skill and word finding. He now states that his symtpoms are improving, but he is mildly delayed from my prior interactions    Interval Problem Update  11/13/19--No events, tunneled HD catheter placed yest by IR and HD initiated, BP low during HD and no fluid removed, pt feels nauseous and weak today, BP stable, acidosis improved, serum K low, BUN improved with HD, getting PRBC transfusion today  11/14/19--Hd treatment #3 today, he reports occasional nausea, and sob.  He reports tolerating HD well and believes it is helping with SOB.    11/15/19--No HD today, Plan for cardiac cath tomorrow. Pt sitting up at bedside, he is feeling well today.  Tolerated HD well yesterday.  Denies pain, n/v, sob.  He is NPO for cardiac cath.    11/16/19--No events, cardiac cath done today and recommendation for medical management and no stents placed, BP stable, seen on dialysis this afternoon  11/17/19--No events, BP stable, tolerated HD yesterday, no new complaints, remains on maintenance IVF's  11/18/19- Pt reports increased mobility from prior, no chest pain  11/22/19 Returns to hospital for eval of DVA, HD yesterday  11/23/19 No new overnight renal events. HD today.   11/24/19 No new overnight renal events. S/p HD yesterday with net UF of 1 L and tolerated well.    Review of Systems  Review of Systems   Constitutional: Negative.    HENT: Negative.    Eyes: Negative.    Respiratory: Negative.    Cardiovascular: Negative.    Gastrointestinal: Negative.    Genitourinary: Negative.    Musculoskeletal: Negative.    Skin: Negative.    Neurological: Positive for speech change and weakness.   Endo/Heme/Allergies: Bruises/bleeds easily.        Physical Exam  Temp:  [36.3 °C (97.4 °F)-37.3 °C  (99.1 °F)] 37.1 °C (98.7 °F)  Pulse:  [76-87] 86  Resp:  [16-19] 16  BP: (109-156)/(43-66) 109/43  SpO2:  [95 %-100 %] 100 %    Physical Exam  Vitals signs and nursing note reviewed.   Constitutional:       Appearance: Normal appearance.   HENT:      Head: Normocephalic and atraumatic.      Mouth/Throat:      Mouth: Mucous membranes are dry.   Eyes:      Conjunctiva/sclera: Conjunctivae normal.      Pupils: Pupils are equal, round, and reactive to light.   Neck:      Musculoskeletal: Normal range of motion and neck supple.      Comments: RIJ tunnel cath  Cardiovascular:      Rate and Rhythm: Normal rate and regular rhythm.      Pulses: Normal pulses.      Heart sounds: Normal heart sounds.   Pulmonary:      Effort: Pulmonary effort is normal.      Breath sounds: Normal breath sounds.   Abdominal:      General: Abdomen is flat.      Palpations: Abdomen is soft.   Musculoskeletal: Normal range of motion.         General: No swelling.   Skin:     General: Skin is warm and dry.      Comments: ehcymosis   Neurological:      General: No focal deficit present.      Mental Status: He is alert and oriented to person, place, and time.      Comments: Pt has decreased speech fluidity from prior   Psychiatric:         Mood and Affect: Mood normal.         Thought Content: Thought content normal.         Fluids    Intake/Output Summary (Last 24 hours) at 11/24/2019 0948  Last data filed at 11/23/2019 1308  Gross per 24 hour   Intake 500 ml   Output 1500 ml   Net -1000 ml       Laboratory  Recent Labs     11/22/19  1053 11/23/19  2106   WBC 7.8 5.7   RBC 2.61* 2.60*   HEMOGLOBIN 8.0* 8.0*   HEMATOCRIT 24.3* 24.4*   MCV 93.1 93.8   MCH 30.7 30.8   MCHC 32.9* 32.8*   RDW 44.3 45.1   PLATELETCT 224 206   MPV 10.3 9.4     Recent Labs     11/22/19  1053 11/23/19  0429 11/23/19  2106   SODIUM 139 141 138   POTASSIUM 3.8 3.7 3.4*   CHLORIDE 101 103 102   CO2 29 29 30   GLUCOSE 105* 92 121*   BUN 17 22 13   CREATININE 2.91* 3.65* 2.54*    CALCIUM 8.0* 7.5* 7.6*     Recent Labs     11/22/19  1053   APTT 33.5   INR 1.47*     No results for input(s): NTPROBNP in the last 72 hours.  Recent Labs     11/23/19  0429   TRIGLYCERIDE 97   HDL 15*   LDL 40       Imaging  1. New ESRD, HD TTS  --R Chest PC placed 11/12 and HD initiated   --[facility]  2. CAD cath completed 11/16-medical mangement  4. HTN--stable  5. DM II--per primary svc  6. Renal Osteodystrophy/CKD Bone Mineral Disorder--phos elevated  --calcium acetate started   --  7. Hyperthyroidism--on methimazone  8. Anemia--secondary to CKD  --No need for IV Iron  -- PRBC transfusion 11/13  9. Secondary Hyperparathyroidism  10. 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 Pruritis--likely related to hyperphosphatemia  14. Moderate Protein-Calorie Malnutrition    Plan  No plans for HD today  Renal dose meds  EPO with HD for anemia        Assessment/Plan  No new Assessment & Plan notes have been filed under this hospital service since the last note was generated.  Service: Nephrology

## 2019-11-26 ENCOUNTER — TELEPHONE (OUTPATIENT)
Dept: CARDIOLOGY | Facility: MEDICAL CENTER | Age: 74
End: 2019-11-26

## 2019-11-26 NOTE — TELEPHONE ENCOUNTER
LVM reminder of apt and wanted to verify with him if he had seen any other cardio or labs or imaging done.

## 2019-11-26 NOTE — DOCUMENTATION QUERY
[facility]                                                                       Query Response Note      PATIENT:               KLARISSA BRADSHAW  ACCT #:                  [id number]  MRN:                     [id number]  :                      1945  ADMIT DATE:       2019 7:13 PM  DISCH DATE:        2019 6:30 PM  RESPONDING  PROVIDER #:        [id number]           QUERY TEXT:    Congestive Heart Failure is documented in the H&P. Please document the type and acuity (includes probable or suspected).     NOTE:  If an appropriate response is not listed below, please respond with a new note.    The patient's Clinical Indicators include:  U/S Cardiac ECHO on 19 noted grade I diastolic dyfunction, thin and hypokinetic mid anteroseptal wall, low normal left ventricular systolic function, and an LVEF of 50-55%.    Treatments include: Metoprolol, U/S Cardiac ECHO, and CXR.    Risk factors include: dx NSTEMI, dx PAT, hx HTN + ESRD + CHF, hx DM II, and Advanced Age.  Options provided:   -- Acute Systolic heart failure   -- Chronic Systolic heart failure   -- Acute on Chronic Systolic heart failure   -- Acute Diastolic heart failure   -- Chronic Diastolic heart failure   -- Acute on Chronic Diastolic heart failure   -- Acute Systolic and Diastolic heart failure   -- Chronic Systolic and Diastolic heart failure   -- Acute on Chronic Systolic and diastolic heart failure   -- Unable to determine      Query created by: Christoph Venegas on 11/15/2019 8:41 AM    RESPONSE TEXT:    Chronic Diastolic heart failure          Electronically signed by:  SABRINA REINA 2019 10:15 AM

## 2019-11-28 ENCOUNTER — HOSPITAL ENCOUNTER (INPATIENT)
Facility: MEDICAL CENTER | Age: 74
LOS: 2 days | DRG: 682 | End: 2019-12-01
Attending: EMERGENCY MEDICINE | Admitting: FAMILY MEDICINE
Payer: MEDICARE

## 2019-11-28 ENCOUNTER — APPOINTMENT (OUTPATIENT)
Dept: RADIOLOGY | Facility: MEDICAL CENTER | Age: 74
DRG: 682 | End: 2019-11-28
Attending: EMERGENCY MEDICINE
Payer: MEDICARE

## 2019-11-28 DIAGNOSIS — R07.9 CHEST PAIN, UNSPECIFIED TYPE: ICD-10-CM

## 2019-11-28 PROBLEM — J18.9 PNEUMONIA: Status: ACTIVE | Noted: 2019-11-28

## 2019-11-28 PROBLEM — I95.9 HYPOTENSION: Status: ACTIVE | Noted: 2019-11-28

## 2019-11-28 PROBLEM — R79.89 ELEVATED LACTIC ACID LEVEL: Status: ACTIVE | Noted: 2019-11-28

## 2019-11-28 PROBLEM — R79.89 ELEVATED TROPONIN: Status: ACTIVE | Noted: 2019-11-28

## 2019-11-28 LAB
ALBUMIN SERPL BCP-MCNC: 3.4 G/DL (ref 3.2–4.9)
ALBUMIN/GLOB SERPL: 1.1 G/DL
ALP SERPL-CCNC: 155 U/L (ref 30–99)
ALT SERPL-CCNC: 17 U/L (ref 2–50)
ANION GAP SERPL CALC-SCNC: 11 MMOL/L (ref 0–11.9)
ANISOCYTOSIS BLD QL SMEAR: ABNORMAL
APPEARANCE UR: CLEAR
AST SERPL-CCNC: 22 U/L (ref 12–45)
BACTERIA #/AREA URNS HPF: NEGATIVE /HPF
BASOPHILS # BLD AUTO: 0 % (ref 0–1.8)
BASOPHILS # BLD: 0 K/UL (ref 0–0.12)
BILIRUB SERPL-MCNC: 0.8 MG/DL (ref 0.1–1.5)
BILIRUB UR QL STRIP.AUTO: NEGATIVE
BLOOD CULTURE HOLD CXBCH: NORMAL
BUN SERPL-MCNC: 19 MG/DL (ref 8–22)
CALCIUM SERPL-MCNC: 8.1 MG/DL (ref 8.5–10.5)
CHLORIDE SERPL-SCNC: 103 MMOL/L (ref 96–112)
CO2 SERPL-SCNC: 29 MMOL/L (ref 20–33)
COLOR UR: YELLOW
CREAT SERPL-MCNC: 3.23 MG/DL (ref 0.5–1.4)
EKG IMPRESSION: NORMAL
EOSINOPHIL # BLD AUTO: 0.46 K/UL (ref 0–0.51)
EOSINOPHIL NFR BLD: 5.3 % (ref 0–6.9)
EPI CELLS #/AREA URNS HPF: NEGATIVE /HPF
ERYTHROCYTE [DISTWIDTH] IN BLOOD BY AUTOMATED COUNT: 47.1 FL (ref 35.9–50)
GLOBULIN SER CALC-MCNC: 3 G/DL (ref 1.9–3.5)
GLUCOSE BLD-MCNC: 128 MG/DL (ref 65–99)
GLUCOSE BLD-MCNC: 132 MG/DL (ref 65–99)
GLUCOSE BLD-MCNC: 88 MG/DL (ref 65–99)
GLUCOSE SERPL-MCNC: 148 MG/DL (ref 65–99)
GLUCOSE UR STRIP.AUTO-MCNC: 100 MG/DL
HCT VFR BLD AUTO: 28.4 % (ref 42–52)
HGB BLD-MCNC: 9.5 G/DL (ref 14–18)
HYALINE CASTS #/AREA URNS LPF: ABNORMAL /LPF
KETONES UR STRIP.AUTO-MCNC: NEGATIVE MG/DL
LACTATE BLD-SCNC: 1.5 MMOL/L (ref 0.5–2)
LACTATE BLD-SCNC: 1.9 MMOL/L (ref 0.5–2)
LACTATE BLD-SCNC: 2.3 MMOL/L (ref 0.5–2)
LEUKOCYTE ESTERASE UR QL STRIP.AUTO: NEGATIVE
LYMPHOCYTES # BLD AUTO: 0.52 K/UL (ref 1–4.8)
LYMPHOCYTES NFR BLD: 6.1 % (ref 22–41)
MANUAL DIFF BLD: NORMAL
MCH RBC QN AUTO: 31.8 PG (ref 27–33)
MCHC RBC AUTO-ENTMCNC: 33.5 G/DL (ref 33.7–35.3)
MCV RBC AUTO: 95 FL (ref 81.4–97.8)
MICRO URNS: ABNORMAL
MONOCYTES # BLD AUTO: 0 K/UL (ref 0–0.85)
MONOCYTES NFR BLD AUTO: 0 % (ref 0–13.4)
MORPHOLOGY BLD-IMP: NORMAL
NEUTROPHILS # BLD AUTO: 7.62 K/UL (ref 1.82–7.42)
NEUTROPHILS NFR BLD: 87.7 % (ref 44–72)
NEUTS BAND NFR BLD MANUAL: 0.9 % (ref 0–10)
NITRITE UR QL STRIP.AUTO: NEGATIVE
NRBC # BLD AUTO: 0 K/UL
NRBC BLD-RTO: 0 /100 WBC
PH UR STRIP.AUTO: >=9 [PH] (ref 5–8)
PLATELET # BLD AUTO: 411 K/UL (ref 164–446)
PLATELET BLD QL SMEAR: NORMAL
PMV BLD AUTO: 9.5 FL (ref 9–12.9)
POIKILOCYTOSIS BLD QL SMEAR: NORMAL
POLYCHROMASIA BLD QL SMEAR: NORMAL
POTASSIUM SERPL-SCNC: 3.6 MMOL/L (ref 3.6–5.5)
PROCALCITONIN SERPL-MCNC: 4.76 NG/ML
PROT SERPL-MCNC: 6.4 G/DL (ref 6–8.2)
RBC # BLD AUTO: 2.99 M/UL (ref 4.7–6.1)
RBC # URNS HPF: ABNORMAL /HPF
RBC BLD AUTO: PRESENT
RBC UR QL AUTO: NEGATIVE
SODIUM SERPL-SCNC: 143 MMOL/L (ref 135–145)
SP GR UR STRIP.AUTO: 1.01
TARGETS BLD QL SMEAR: NORMAL
TROPONIN T SERPL-MCNC: 121 NG/L (ref 6–19)
TROPONIN T SERPL-MCNC: 122 NG/L (ref 6–19)
TROPONIN T SERPL-MCNC: 136 NG/L (ref 6–19)
UROBILINOGEN UR STRIP.AUTO-MCNC: 1 MG/DL
WBC # BLD AUTO: 8.6 K/UL (ref 4.8–10.8)
WBC #/AREA URNS HPF: ABNORMAL /HPF

## 2019-11-28 PROCEDURE — 94760 N-INVAS EAR/PLS OXIMETRY 1: CPT

## 2019-11-28 PROCEDURE — 36415 COLL VENOUS BLD VENIPUNCTURE: CPT

## 2019-11-28 PROCEDURE — 99220 PR INITIAL OBSERVATION CARE,LEVL III: CPT | Performed by: FAMILY MEDICINE

## 2019-11-28 PROCEDURE — 85007 BL SMEAR W/DIFF WBC COUNT: CPT

## 2019-11-28 PROCEDURE — 84484 ASSAY OF TROPONIN QUANT: CPT | Mod: 91

## 2019-11-28 PROCEDURE — 96366 THER/PROPH/DIAG IV INF ADDON: CPT

## 2019-11-28 PROCEDURE — 80053 COMPREHEN METABOLIC PANEL: CPT

## 2019-11-28 PROCEDURE — 93005 ELECTROCARDIOGRAM TRACING: CPT

## 2019-11-28 PROCEDURE — G0378 HOSPITAL OBSERVATION PER HR: HCPCS

## 2019-11-28 PROCEDURE — 700105 HCHG RX REV CODE 258: Performed by: FAMILY MEDICINE

## 2019-11-28 PROCEDURE — 700111 HCHG RX REV CODE 636 W/ 250 OVERRIDE (IP): Performed by: INTERNAL MEDICINE

## 2019-11-28 PROCEDURE — 700102 HCHG RX REV CODE 250 W/ 637 OVERRIDE(OP): Performed by: FAMILY MEDICINE

## 2019-11-28 PROCEDURE — 82962 GLUCOSE BLOOD TEST: CPT

## 2019-11-28 PROCEDURE — 81001 URINALYSIS AUTO W/SCOPE: CPT

## 2019-11-28 PROCEDURE — 99285 EMERGENCY DEPT VISIT HI MDM: CPT

## 2019-11-28 PROCEDURE — 71045 X-RAY EXAM CHEST 1 VIEW: CPT

## 2019-11-28 PROCEDURE — 96365 THER/PROPH/DIAG IV INF INIT: CPT

## 2019-11-28 PROCEDURE — 87040 BLOOD CULTURE FOR BACTERIA: CPT

## 2019-11-28 PROCEDURE — 83605 ASSAY OF LACTIC ACID: CPT | Mod: 91

## 2019-11-28 PROCEDURE — 700111 HCHG RX REV CODE 636 W/ 250 OVERRIDE (IP): Performed by: FAMILY MEDICINE

## 2019-11-28 PROCEDURE — 85027 COMPLETE CBC AUTOMATED: CPT

## 2019-11-28 PROCEDURE — 84145 PROCALCITONIN (PCT): CPT

## 2019-11-28 PROCEDURE — 93005 ELECTROCARDIOGRAM TRACING: CPT | Performed by: EMERGENCY MEDICINE

## 2019-11-28 PROCEDURE — A9270 NON-COVERED ITEM OR SERVICE: HCPCS | Performed by: FAMILY MEDICINE

## 2019-11-28 RX ORDER — ACETAMINOPHEN 325 MG/1
650 TABLET ORAL EVERY 6 HOURS PRN
Status: DISCONTINUED | OUTPATIENT
Start: 2019-11-28 | End: 2019-12-01 | Stop reason: HOSPADM

## 2019-11-28 RX ORDER — AMOXICILLIN 250 MG
2 CAPSULE ORAL 2 TIMES DAILY
Status: DISCONTINUED | OUTPATIENT
Start: 2019-11-28 | End: 2019-12-01 | Stop reason: HOSPADM

## 2019-11-28 RX ORDER — CLOPIDOGREL BISULFATE 75 MG/1
75 TABLET ORAL DAILY
Status: DISCONTINUED | OUTPATIENT
Start: 2019-11-28 | End: 2019-12-01 | Stop reason: HOSPADM

## 2019-11-28 RX ORDER — HEPARIN SODIUM 1000 [USP'U]/ML
1900 INJECTION, SOLUTION INTRAVENOUS; SUBCUTANEOUS PRN
Status: DISCONTINUED | OUTPATIENT
Start: 2019-11-28 | End: 2019-12-01 | Stop reason: HOSPADM

## 2019-11-28 RX ORDER — POLYETHYLENE GLYCOL 3350 17 G/17G
1 POWDER, FOR SOLUTION ORAL
Status: DISCONTINUED | OUTPATIENT
Start: 2019-11-28 | End: 2019-12-01 | Stop reason: HOSPADM

## 2019-11-28 RX ORDER — ONDANSETRON 4 MG/1
4 TABLET, ORALLY DISINTEGRATING ORAL EVERY 4 HOURS PRN
Status: DISCONTINUED | OUTPATIENT
Start: 2019-11-28 | End: 2019-12-01 | Stop reason: HOSPADM

## 2019-11-28 RX ORDER — OMEPRAZOLE 20 MG/1
40 CAPSULE, DELAYED RELEASE ORAL DAILY
Status: DISCONTINUED | OUTPATIENT
Start: 2019-11-28 | End: 2019-12-01 | Stop reason: HOSPADM

## 2019-11-28 RX ORDER — METHIMAZOLE 5 MG/1
5 TABLET ORAL DAILY
Status: DISCONTINUED | OUTPATIENT
Start: 2019-11-28 | End: 2019-12-01 | Stop reason: HOSPADM

## 2019-11-28 RX ORDER — ATORVASTATIN CALCIUM 80 MG/1
80 TABLET, FILM COATED ORAL EVERY EVENING
Status: DISCONTINUED | OUTPATIENT
Start: 2019-11-28 | End: 2019-12-01 | Stop reason: HOSPADM

## 2019-11-28 RX ORDER — BISACODYL 10 MG
10 SUPPOSITORY, RECTAL RECTAL
Status: DISCONTINUED | OUTPATIENT
Start: 2019-11-28 | End: 2019-12-01 | Stop reason: HOSPADM

## 2019-11-28 RX ORDER — ONDANSETRON 2 MG/ML
4 INJECTION INTRAMUSCULAR; INTRAVENOUS EVERY 4 HOURS PRN
Status: DISCONTINUED | OUTPATIENT
Start: 2019-11-28 | End: 2019-12-01 | Stop reason: HOSPADM

## 2019-11-28 RX ORDER — LABETALOL HYDROCHLORIDE 5 MG/ML
10 INJECTION, SOLUTION INTRAVENOUS EVERY 4 HOURS PRN
Status: DISCONTINUED | OUTPATIENT
Start: 2019-11-28 | End: 2019-12-01 | Stop reason: HOSPADM

## 2019-11-28 RX ORDER — CALCITRIOL 0.25 UG/1
0.25 CAPSULE, LIQUID FILLED ORAL
Status: DISCONTINUED | OUTPATIENT
Start: 2019-11-29 | End: 2019-12-01 | Stop reason: HOSPADM

## 2019-11-28 RX ORDER — CALCIUM ACETATE 667 MG/1
667 TABLET ORAL
Status: DISCONTINUED | OUTPATIENT
Start: 2019-11-28 | End: 2019-12-01 | Stop reason: HOSPADM

## 2019-11-28 RX ADMIN — PIPERACILLIN AND TAZOBACTAM 3.38 G: 3; .375 INJECTION, POWDER, LYOPHILIZED, FOR SOLUTION INTRAVENOUS; PARENTERAL at 11:25

## 2019-11-28 RX ADMIN — ATORVASTATIN CALCIUM 80 MG: 80 TABLET, FILM COATED ORAL at 16:46

## 2019-11-28 RX ADMIN — PIPERACILLIN AND TAZOBACTAM 3.38 G: 3; .375 INJECTION, POWDER, LYOPHILIZED, FOR SOLUTION INTRAVENOUS; PARENTERAL at 16:46

## 2019-11-28 RX ADMIN — METHIMAZOLE 5 MG: 5 TABLET ORAL at 11:25

## 2019-11-28 RX ADMIN — Medication 667 MG: at 11:25

## 2019-11-28 RX ADMIN — APIXABAN 5 MG: 5 TABLET, FILM COATED ORAL at 16:46

## 2019-11-28 RX ADMIN — SENNOSIDES AND DOCUSATE SODIUM 2 TABLET: 8.6; 5 TABLET ORAL at 11:25

## 2019-11-28 RX ADMIN — CLOPIDOGREL BISULFATE 75 MG: 75 TABLET ORAL at 11:25

## 2019-11-28 RX ADMIN — OMEPRAZOLE 40 MG: 20 CAPSULE, DELAYED RELEASE ORAL at 11:25

## 2019-11-28 RX ADMIN — APIXABAN 5 MG: 5 TABLET, FILM COATED ORAL at 11:25

## 2019-11-28 RX ADMIN — Medication 667 MG: at 16:46

## 2019-11-28 RX ADMIN — HEPARIN SODIUM 1900 UNITS: 1000 INJECTION, SOLUTION INTRAVENOUS; SUBCUTANEOUS at 10:09

## 2019-11-28 ASSESSMENT — CHA2DS2 SCORE
SEX: MALE
DIABETES: YES
PRIOR STROKE OR TIA OR THROMBOEMBOLISM: NO
CHF OR LEFT VENTRICULAR DYSFUNCTION: NO
VASCULAR DISEASE: YES
HYPERTENSION: YES
CHA2DS2 VASC SCORE: 5
AGE 75 OR GREATER: YES
AGE 65 TO 74: NO

## 2019-11-28 ASSESSMENT — LIFESTYLE VARIABLES
EVER HAD A DRINK FIRST THING IN THE MORNING TO STEADY YOUR NERVES TO GET RID OF A HANGOVER: NO
EVER_SMOKED: YES
TOTAL SCORE: 0
EVER FELT BAD OR GUILTY ABOUT YOUR DRINKING: NO
CONSUMPTION TOTAL: NEGATIVE
HOW MANY TIMES IN THE PAST YEAR HAVE YOU HAD 5 OR MORE DRINKS IN A DAY: 0
ON A TYPICAL DAY WHEN YOU DRINK ALCOHOL HOW MANY DRINKS DO YOU HAVE: 0
EVER_SMOKED: YES
HAVE PEOPLE ANNOYED YOU BY CRITICIZING YOUR DRINKING: NO
TOTAL SCORE: 0
ALCOHOL_USE: NO
AVERAGE NUMBER OF DAYS PER WEEK YOU HAVE A DRINK CONTAINING ALCOHOL: 0
TOTAL SCORE: 0
HAVE YOU EVER FELT YOU SHOULD CUT DOWN ON YOUR DRINKING: NO

## 2019-11-28 ASSESSMENT — ENCOUNTER SYMPTOMS
ABDOMINAL PAIN: 0
CHILLS: 0
SENSORY CHANGE: 0
BACK PAIN: 0
FLANK PAIN: 0
SHORTNESS OF BREATH: 0
NERVOUS/ANXIOUS: 0
FOCAL WEAKNESS: 0
FEVER: 0
WHEEZING: 0
NECK PAIN: 0
SPEECH CHANGE: 0
NAUSEA: 0
WEAKNESS: 1
HEADACHES: 0
DIZZINESS: 1
PALPITATIONS: 0
HEARTBURN: 0
SORE THROAT: 0
DIAPHORESIS: 0
BLURRED VISION: 0
DIARRHEA: 0
VOMITING: 0
COUGH: 1

## 2019-11-28 ASSESSMENT — COGNITIVE AND FUNCTIONAL STATUS - GENERAL
MOBILITY SCORE: 21
STANDING UP FROM CHAIR USING ARMS: A LITTLE
SUGGESTED CMS G CODE MODIFIER DAILY ACTIVITY: CH
CLIMB 3 TO 5 STEPS WITH RAILING: A LITTLE
SUGGESTED CMS G CODE MODIFIER MOBILITY: CJ
WALKING IN HOSPITAL ROOM: A LITTLE
DAILY ACTIVITIY SCORE: 24

## 2019-11-28 ASSESSMENT — PATIENT HEALTH QUESTIONNAIRE - PHQ9
SUM OF ALL RESPONSES TO PHQ9 QUESTIONS 1 AND 2: 0
1. LITTLE INTEREST OR PLEASURE IN DOING THINGS: NOT AT ALL
2. FEELING DOWN, DEPRESSED, IRRITABLE, OR HOPELESS: NOT AT ALL

## 2019-11-28 ASSESSMENT — COPD QUESTIONNAIRES
COPD SCREENING SCORE: 8
HAVE YOU SMOKED AT LEAST 100 CIGARETTES IN YOUR ENTIRE LIFE: YES
DURING THE PAST 4 WEEKS HOW MUCH DID YOU FEEL SHORT OF BREATH: MOST  OR ALL OF THE TIME
DO YOU EVER COUGH UP ANY MUCUS OR PHLEGM?: YES, A FEW DAYS A WEEK OR MONTH

## 2019-11-28 NOTE — ASSESSMENT & PLAN NOTE
"LHC - Patent coronary stents proximal mid, left anterior descending artery.Coronary artery disease involving diagonal \"jailed\" branch ostial 90% stenosis, posterior descending artery ostial 50% stenosis. 11/16/2019  Continue Plavix, Lipitor  Hold metoprolol  "

## 2019-11-28 NOTE — PROGRESS NOTES
Drawn blood culture x 1 through HD catheter. Pt was very pleasant, not in distress. Brenda, primary RN at bedside with me.

## 2019-11-28 NOTE — ASSESSMENT & PLAN NOTE
I do not think this patient has pneumonia.  He has a chronic cough for months since.  There was no fever chills.  Cough is nonproductive.  No recent exposure to sick contacts.  Chest x-ray with no evidence of infiltrate or consolidation.  Was started IV Zosyn.  I will stop antibiotics.

## 2019-11-28 NOTE — H&P
"Hospital Medicine History & Physical Note    Date of Service  11/28/2019    Primary Care Physician  Patito Edgar M.D.    Consultants  Nephrology    Code Status  Full    Chief Complaint  dizziness    History of Presenting Illness  73 y.o. male who presented 11/28/2019 with dizziness.  Patient states that he was at dialysis today, they were going to get started on dialysis when we discussed starting started getting dizzy and lightheaded, apparently got hypotensive.  He was going told to go to the emergency room.  He also experienced very mild chest pressure he denied having diaphoresis, palpitations, shortness of breath.  Patient states on his previous dialysis day on Tuesday he also had dizziness and lightheadedness while they were doing dialysis he did not have chest pressure down.  The dialysis RN told him that he might have passed out as well but they were able to do the complete treatment.  He was told to go to emergency room then but he did not.  His EKG here shows no acute changes.  His troponin is elevated more than 100, but he did have history of a recent NSTEMI about 2 weeks ago and compared to the previous troponin is much lower than previous.  He had left heart catheterization done which showed Patent coronary stents proximal mid, left anterior descending   artery.Coronary artery disease involving diagonal \"jailed\" branch ostial 90% stenosis, posterior descending artery ostial 50% stenosis.  Recommendation was maximal medical management.  He also had a recent admission here for possible TIA but it turned out to be more of acute encephalopathy per Neurology.  His chest x-ray here shows a left opacity, his white count is elevated, I ordered procalcitonin this was elevated more than 4.  His lactic acid was also elevated.  He does complain of a cough, denies any fever chills.    Review of Systems  Review of Systems   Constitutional: Negative for chills, diaphoresis, fever and malaise/fatigue.   HENT: " Negative for hearing loss and sore throat.    Eyes: Negative for blurred vision.   Respiratory: Positive for cough. Negative for shortness of breath and wheezing.    Cardiovascular: Positive for chest pain and leg swelling. Negative for palpitations.   Gastrointestinal: Negative for abdominal pain, diarrhea, heartburn, nausea and vomiting.   Genitourinary: Negative for dysuria, flank pain and hematuria.   Musculoskeletal: Negative for back pain and neck pain.   Skin: Negative for rash.   Neurological: Positive for dizziness and weakness. Negative for sensory change, speech change, focal weakness and headaches.   Psychiatric/Behavioral: The patient is not nervous/anxious.        Past Medical History   has a past medical history of CAD (coronary artery disease), Chronic kidney disease (CKD), stage V (HCC), Diabetes, Hypertension, Osteoarthritis, and Peripheral neuropathy.    Surgical History   has a past surgical history that includes other cardiac surgery; appendectomy; and other orthopedic surgery.     Family History  family history includes Alcohol/Drug in his father; Diabetes in his brother; Heart Disease in his mother; Thyroid in his son.     Social History   reports that he quit smoking about 5 years ago. He has a 55.00 pack-year smoking history. He has never used smokeless tobacco. He reports that he does not drink alcohol or use drugs.    Allergies  No Known Allergies    Medications  Prior to Admission Medications   Prescriptions Last Dose Informant Patient Reported? Taking?   apixaban (ELIQUIS) 5mg Tab 11/27/2019 at 2100 Patient No No   Sig: Take 1 Tab by mouth 2 Times a Day for 7 days.   atorvastatin (LIPITOR) 80 MG tablet 11/27/2019 at 2100 Patient No No   Sig: Take 1 Tab by mouth every evening for 7 days.   calcitRIOL (ROCALTROL) 0.25 MCG Cap 11/27/2019 at 1800 Patient No No   Sig: Take 1 Cap by mouth every Monday, Wednesday, and Friday for 7 days.   calcium acetate (PHOS-LO) 667 MG Tab tablet NEW RX  Patient No No   Sig: Take 1 Tab by mouth 3 times a day before meals for 7 days.   clopidogrel (PLAVIX) 75 MG Tab 11/27/2019 at 0800 Patient No No   Sig: Take 1 Tab by mouth every day for 7 days.   furosemide (LASIX) 40 MG Tab 11/27/2019 at 0800 Patient No No   Sig: Take 1 Tab by mouth 1 time daily as needed for up to 7 days. Indications: Edema   insulin glargine (LANTUS SOLOSTAR) 100 UNIT/ML Solution Pen-injector injection 11/27/2019 at PM Patient No No   Sig: Inject 5 Units as instructed every evening for 60 days.   methimazole (TAPAZOLE) 5 MG Tab 11/27/2019 at 0800 Patient No No   Sig: Take 1 Tab by mouth every day for 7 days.   metoprolol (LOPRESSOR) 100 MG Tab 11/27/2019 at 2100 Patient No No   Sig: Take 1 Tab by mouth 2 times a day for 7 days.   nitroglycerin (NITROSTAT) 0.4 MG SL Tab PRN at PRN Patient No No   Sig: Place 1 Tab under tongue as needed for Chest Pain (donot take if Blood pressure is less than 100/60 mm of Hg) for up to 10 doses.   pantoprazole (PROTONIX) 40 MG Tablet Delayed Response 11/27/2019 at 0800 Patient No No   Sig: Take 1 Tab by mouth every day for 7 days.      Facility-Administered Medications: None       Physical Exam  Temp:  [37.1 °C (98.7 °F)] 37.1 °C (98.7 °F)  Pulse:  [] 80  Resp:  [16-22] 16  BP: ()/(54-68) 156/62  SpO2:  [98 %-100 %] 100 %    Physical Exam  Constitutional:       General: He is not in acute distress.  HENT:      Head: Normocephalic and atraumatic.      Nose: No congestion.      Mouth/Throat:      Mouth: Mucous membranes are dry.   Eyes:      Extraocular Movements: Extraocular movements intact.      Conjunctiva/sclera: Conjunctivae normal.      Pupils: Pupils are equal, round, and reactive to light.   Neck:      Musculoskeletal: No muscular tenderness.   Cardiovascular:      Rate and Rhythm: Normal rate and regular rhythm.   Pulmonary:      Effort: Pulmonary effort is normal.      Breath sounds: Rales present.   Abdominal:      General: Bowel sounds are  normal. There is no distension.      Palpations: Abdomen is soft.      Tenderness: There is no tenderness. There is no guarding or rebound.   Musculoskeletal:      Right lower leg: Edema present.      Left lower leg: Edema present.   Lymphadenopathy:      Cervical: No cervical adenopathy.   Skin:     General: Skin is dry.   Neurological:      General: No focal deficit present.      Mental Status: He is alert and oriented to person, place, and time.      Cranial Nerves: No cranial nerve deficit.         Laboratory:  Recent Labs     11/28/19  0735   WBC 8.6   RBC 2.99*   HEMOGLOBIN 9.5*   HEMATOCRIT 28.4*   MCV 95.0   MCH 31.8   MCHC 33.5*   RDW 47.1   PLATELETCT 411   MPV 9.5     Recent Labs     11/28/19  0735   SODIUM 143   POTASSIUM 3.6   CHLORIDE 103   CO2 29   GLUCOSE 148*   BUN 19   CREATININE 3.23*   CALCIUM 8.1*     Recent Labs     11/28/19  0735   ALTSGPT 17   ASTSGOT 22   ALKPHOSPHAT 155*   TBILIRUBIN 0.8   GLUCOSE 148*         No results for input(s): NTPROBNP in the last 72 hours.      Recent Labs     11/28/19  0735   TROPONINT 136*       Urinalysis:    No results found     Imaging:  DX-CHEST-PORTABLE (1 VIEW)   Final Result      Enlarged cardiac silhouette with mild vascular congestion.      Persistent increased density in the left lung base could be due to atelectasis or scarring.            Assessment/Plan:  I anticipate this patient is appropriate for observation status at this time.    Pneumonia- (present on admission)  Assessment & Plan  Start IV Zosyn    Hypotension- (present on admission)  Assessment & Plan  Hold metoprolol and Lasix    Elevated lactic acid level- (present on admission)  Assessment & Plan  Serial lactic acid    Elevated troponin- (present on admission)  Assessment & Plan  Serial troponin  Continue Plavix, Lipitor    Paroxysmal A-fib (HCC)- (present on admission)  Assessment & Plan  Continue Eliquis  Hold metoprolol    End stage renal disease on dialysis (HCC)- (present on  "admission)  Assessment & Plan  Nephrology consulted  Hold Lasix    Diabetes mellitus type 2, controlled, with complications (HCC)- (present on admission)  Assessment & Plan  SSI    Anemia of chronic disease- (present on admission)  Assessment & Plan  Follow CBC    Essential hypertension- (present on admission)  Assessment & Plan  Hold metoprolol    CAD (coronary artery disease)- (present on admission)  Assessment & Plan  LHC - Patent coronary stents proximal mid, left anterior descending artery.Coronary artery disease involving diagonal \"jailed\" branch ostial 90% stenosis, posterior descending artery ostial 50% stenosis. 11/16/2019  Continue Plavix, Lipitor  Hold metoprolol    Hyperthyroidism- (present on admission)  Assessment & Plan  Continue Tapazole        VTE prophylaxis: Eliquis  "

## 2019-11-28 NOTE — ED PROVIDER NOTES
ED Provider Note    CHIEF COMPLAINT  Chief Complaint   Patient presents with   • Chest Pain     pt experienced CP during dialysis. relieved pta. asa give pta    • Shaking     pt states same s/s during his last outpt dialysis treatment        HPI  Luis Sepulveda is a 73 y.o. male who presents with chest pain, shortness of breath and shaking chills.  Patient was recently admitted to the hospital with chest pain.  He sustained a non-STEMI.  Underwent angiogram.  He has coronary artery disease, but no intervention was available.  Maximal medical therapy was recommended.  Since his discharge he has gone to dialysis twice.  On Tuesday he had a similar episode as today.  He states that shortly after initiation of dialysis he starts shaking all over and has chest pain.  He feels weak and lightheaded like he is going to pass out.  His blood pressure has been low with systolics as low as the 70s during dialysis.  He was able to complete dialysis on Tuesday, but today his symptoms were too severe and therefore was brought to the ER.  On arrival he still feels cold, but he is feeling much better.  He had chest pain earlier but no longer chest pain.  Substernal chest tightness nonradiating.  Associated shortness of breath.  No pleuritic pain.  No tearing pain or back pain.    REVIEW OF SYSTEMS  As per HPI, otherwise a 10 point review of systems is negative    PAST MEDICAL HISTORY  Past Medical History:   Diagnosis Date   • CAD (coronary artery disease)     stents   • Chronic kidney disease (CKD), stage V (HCC)    • Diabetes    • Hypertension    • Osteoarthritis    • Peripheral neuropathy        SOCIAL HISTORY  Social History     Tobacco Use   • Smoking status: Former Smoker     Packs/day: 1.00     Years: 55.00     Pack years: 55.00     Last attempt to quit: 2014     Years since quittin.8   • Smokeless tobacco: Never Used   Substance Use Topics   • Alcohol use: No   • Drug use: No       SURGICAL HISTORY  Past  Surgical History:   Procedure Laterality Date   • APPENDECTOMY     • OTHER CARDIAC SURGERY      stents   • OTHER ORTHOPEDIC SURGERY      knee surgery       CURRENT MEDICATIONS  Home Medications     Reviewed by Sandra Mathew (Pharmacy Tech) on 11/28/19 at 0911  Med List Status: Complete   Medication Last Dose Status   apixaban (ELIQUIS) 5mg Tab 11/27/2019 Active   atorvastatin (LIPITOR) 80 MG tablet 11/27/2019 Active   calcitRIOL (ROCALTROL) 0.25 MCG Cap 11/27/2019 Active   calcium acetate (PHOS-LO) 667 MG Tab tablet NEW RX Active   clopidogrel (PLAVIX) 75 MG Tab 11/27/2019 Active   furosemide (LASIX) 40 MG Tab 11/27/2019 Active   insulin glargine (LANTUS SOLOSTAR) 100 UNIT/ML Solution Pen-injector injection 11/27/2019 Active   methimazole (TAPAZOLE) 5 MG Tab 11/27/2019 Active   metoprolol (LOPRESSOR) 100 MG Tab 11/27/2019 Active   nitroglycerin (NITROSTAT) 0.4 MG SL Tab PRN Active   pantoprazole (PROTONIX) 40 MG Tablet Delayed Response 11/27/2019 Active                ALLERGIES  No Known Allergies    PHYSICAL EXAM  VITAL SIGNS: /65   Pulse 90   Temp 37.1 °C (98.7 °F)   Resp (!) 25   Wt 71.7 kg (158 lb)   SpO2 100%   BMI 26.29 kg/m²    Constitutional: Awake and alert elderly pale male hENT:  Atraumatic, Normocephalic.Oropharynx moist mucus membranes, Nose normal inspection.   Eyes: Normal inspection  Neck: Supple  Cardiovascular: Normal heart rate, Normal rhythm.  Symmetric peripheral pulses.   Thorax & Lungs: No respiratory distress, No wheezing, No rales, No rhonchi, No chest tenderness.  Permacath right upper chest which is without erythema or swelling  Abdomen: Bowel sounds normal, soft, non-distended, nontender, no mass  Skin: Warm, Dry, No rash.   Back: No tenderness, No CVA tenderness.   Extremities: No asymmetric swelling  Neurologic: Grossly normal   Psychiatric: Anxious appearing    RADIOLOGY/PROCEDURES  DX-CHEST-PORTABLE (1 VIEW)   Final Result      Enlarged cardiac silhouette with  mild vascular congestion.      Persistent increased density in the left lung base could be due to atelectasis or scarring.           Imaging is interpreted by radiologist    Labs:  Results for orders placed or performed during the hospital encounter of 11/28/19   CBC WITH DIFFERENTIAL   Result Value Ref Range    WBC 8.6 4.8 - 10.8 K/uL    RBC 2.99 (L) 4.70 - 6.10 M/uL    Hemoglobin 9.5 (L) 14.0 - 18.0 g/dL    Hematocrit 28.4 (L) 42.0 - 52.0 %    MCV 95.0 81.4 - 97.8 fL    MCH 31.8 27.0 - 33.0 pg    MCHC 33.5 (L) 33.7 - 35.3 g/dL    RDW 47.1 35.9 - 50.0 fL    Platelet Count 411 164 - 446 K/uL    MPV 9.5 9.0 - 12.9 fL    Neutrophils-Polys 87.70 (H) 44.00 - 72.00 %    Lymphocytes 6.10 (L) 22.00 - 41.00 %    Monocytes 0.00 0.00 - 13.40 %    Eosinophils 5.30 0.00 - 6.90 %    Basophils 0.00 0.00 - 1.80 %    Nucleated RBC 0.00 /100 WBC    Neutrophils (Absolute) 7.62 (H) 1.82 - 7.42 K/uL    Lymphs (Absolute) 0.52 (L) 1.00 - 4.80 K/uL    Monos (Absolute) 0.00 0.00 - 0.85 K/uL    Eos (Absolute) 0.46 0.00 - 0.51 K/uL    Baso (Absolute) 0.00 0.00 - 0.12 K/uL    NRBC (Absolute) 0.00 K/uL    Anisocytosis 1+    COMP METABOLIC PANEL   Result Value Ref Range    Sodium 143 135 - 145 mmol/L    Potassium 3.6 3.6 - 5.5 mmol/L    Chloride 103 96 - 112 mmol/L    Co2 29 20 - 33 mmol/L    Anion Gap 11.0 0.0 - 11.9    Glucose 148 (H) 65 - 99 mg/dL    Bun 19 8 - 22 mg/dL    Creatinine 3.23 (H) 0.50 - 1.40 mg/dL    Calcium 8.1 (L) 8.5 - 10.5 mg/dL    AST(SGOT) 22 12 - 45 U/L    ALT(SGPT) 17 2 - 50 U/L    Alkaline Phosphatase 155 (H) 30 - 99 U/L    Total Bilirubin 0.8 0.1 - 1.5 mg/dL    Albumin 3.4 3.2 - 4.9 g/dL    Total Protein 6.4 6.0 - 8.2 g/dL    Globulin 3.0 1.9 - 3.5 g/dL    A-G Ratio 1.1 g/dL   TROPONIN   Result Value Ref Range    Troponin T 136 (H) 6 - 19 ng/L   LACTIC ACID   Result Value Ref Range    Lactic Acid 2.3 (H) 0.5 - 2.0 mmol/L   LACTIC ACID   Result Value Ref Range    Lactic Acid 1.9 0.5 - 2.0 mmol/L   MORPHOLOGY   Result  Value Ref Range    RBC Morphology Present     Polychromia 1+     Poikilocytosis 1+     Target Cells 1+    PERIPHERAL SMEAR REVIEW   Result Value Ref Range    Peripheral Smear Review see below    DIFFERENTIAL MANUAL   Result Value Ref Range    Bands-Stabs 0.90 0.00 - 10.00 %    Manual Diff Status PERFORMED    PLATELET ESTIMATE   Result Value Ref Range    Plt Estimation Normal    ESTIMATED GFR   Result Value Ref Range    GFR If  23 (A) >60 mL/min/1.73 m 2    GFR If Non  19 (A) >60 mL/min/1.73 m 2   EKG (NOW)   Result Value Ref Range    Report       [facility] Emergency Dept.    Test Date:  2019  Pt Name:    KLARISSA BRADSHAW              Department: ER  MRN:        [id number]                      Room:       RD 02  Gender:     Male                         Technician: [id number]  :        1945                   Requested By:ER TRIAGE PROTOCOL  Order #:    [id number]                    Reading MD: ÁNGEL SMALL MD    Measurements  Intervals                                Axis  Rate:       114                          P:          0  NV:         114                          QRS:        -81  QRSD:       136                          T:          75  QT:         352  QTc:        485    Interpretive Statements  SINUS TACHYCARDIA  RIGHT BUNDLE BRANCH BLOCK  PROBABLE ANTEROSEPTAL INFARCT, AGE INDETERM  Compared to ECG 2019 11:08:19  Myocardial infarct finding now present  Sinus rhythm no longer present  Left anterior fascicular block no longer present  Electronically Signed On 2019 8:34:50 PST by ÁNGEL SMALL MD           COURSE & MEDICAL DECISION MAKING  Patient presents after hypotension, chest pain and shaking chills while at dialysis.  EKG does not show acute ischemia.  His chest pain is resolved.  His vital signs improved.  Laboratory data was collected.  He has an elevated troponin, but this is improved from prior as well.  I am worried about  infection of his permacath given shaking chills that initiation of dialysis.  I ordered cultures.  I do not see any active infection or indication for immediate antibiotics as I suspect more likely his symptoms are dialysis related rather than infectious in etiology.  I consulted Dr. Zoe SEGUNDO, nephrology.  We reviewed the case.  He agreed with the above.  Patient will be admitted to follow blood cultures.  Trial of dialysis while inpatient.  I spoke with Dr. Anderson about the event.  It is felt most likely his chest pain is related to anemia in conjunction with hypotensive episode.  Advised continued medical management.  This seems reasonable.  I consulted Dr. Leavitt for admission.    FINAL IMPRESSION  1.  Chest pain  2.  Shaking chills  3.  Hypotensive episode  4.  Adverse reaction to dialysis      This dictation was created using voice recognition software. The accuracy of the dictation is limited to the abilities of the software.  The nursing notes were reviewed and certain aspects of this information were incorporated into this note.      Electronically signed by: En Birmingham, 11/28/2019 10:11 AM

## 2019-11-28 NOTE — PROGRESS NOTES
Patient transported to room T828-1 via gurney with this ACLS RN on Zoll monitor. Patient ambulated to hospital bed. Placed patient on monitor and verified with monitor room. POC discussed with patient; no questions or needs at this time. Patient oriented to room and unit policies. Bed locked in lowest position and call light is within reach; patient calls appropriately.

## 2019-11-28 NOTE — ASSESSMENT & PLAN NOTE
Likely orthostatic hypotension evident by patient having lightheadedness when standing up from seated position in the light of recently starting hemodialysis and taken blood pressure medications as well.  Hold metoprolol and Lasix for now  Blood pressure is normalizing.  11/30: Hypotensive today after hemodialysis.  Holding his blood pressure medications for now.

## 2019-11-28 NOTE — ASSESSMENT & PLAN NOTE
Likely demand ischemia in the light of coronary artery disease and end-stage renal disease  Denies any chest pain  Cardiology following.  Continue current medications as hemodynamically tolerates.

## 2019-11-28 NOTE — ED TRIAGE NOTES
Pt to R2 .  Chief Complaint   Patient presents with   • Chest Pain     pt experienced CP during dialysis. relieved pta. asa give pta    • Shaking     pt states same s/s during his last outpt dialysis treatment

## 2019-11-28 NOTE — CONSULTS
[facility] -  CONSULTATION NOTE               Author: Mario Hunter M.D. Date & Time: 11/28/2019  9:03 AM       REASON FOR CONSULTATION:   Inpatient hemodialysis management.    CHIEF COMPLAINT:   Chest pressure    HISTORY OF PRESENT ILLNESS:    73-year-old male with history of new ESRD and recent end STEMI presented with chest pressure.    Recently initiated hemodialysis via right chest permacath.  Went to dialysis on Tuesday and had some symptomatic hypotension.  At home on Wednesday also had lightheadedness and son wondered if he was having a stroke.  Went to dialysis this a.m. blood pressure was very low upon starting then developed some chest pressure prompting cessation of his session after 20 minutes and transferred to ED.    In the ED initial blood pressure noted to be 90/60, tachycardic.  Blood cultures sent.  Recent Cath done on 11/16 - patent coronary stents proximal to distal, left anterior descending.     REVIEW OF SYSTEMS:    General: No weakness, no malaise  HEENT: +vision changes, no hearing changes  CV: +chest pressure, no palpitations  Lungs: No cough; no shortness of breath  GI: No Nausea; no vomiting  : No dysuria or gross hematuria  MSK: No joint pain; no trauma  Skin: No rashes; no lesions  Neuro: No tremors; no LOC  Psych: No depression; no anxiety    PAST MEDICAL HISTORY:   Past Medical History:   Diagnosis Date   • CAD (coronary artery disease)     stents   • Chronic kidney disease (CKD), stage V (HCC)    • Diabetes    • Hypertension    • Osteoarthritis    • Peripheral neuropathy          PAST SURGICAL HISTORY:   Past Surgical History:   Procedure Laterality Date   • APPENDECTOMY     • OTHER CARDIAC SURGERY      stents   • OTHER ORTHOPEDIC SURGERY      knee surgery       FAMILY HISTORY:   Reviewed and non contributory to current illness    SOCIAL HISTORY:   No smoking, no etoh, no illicit drugs.    HOME MEDICATIONS:   Reviewed and documented in  chart    ALLERGIES:  Patient has no known allergies.    PHYSICAL EXAM:  VS:  /67   Pulse 98   Temp 37.1 °C (98.7 °F)   Resp (!) 25   Wt 71.7 kg (158 lb)   SpO2 100%   BMI 26.29 kg/m²   GENERAL: sitting in bed, no acute distress  HEAD: Normocephalic, atraumatic  EYES: no scleral icterus; normal conjunctiva  NECK: Supple; trachea midline  CV: RRR, S1 and S2 present  LUNGS: Clear to ausculation bilaterally, No rhales or wheezes  ABDOMEN: Soft, non-tender  EXTREMETIES:  +lower extremity edema, no clubbing or cyanosis  SKIN: Warm and dry, no rashes  NEURO: A&O, no focal deficits  PSYCH: Cooperative, appropriate mood and affect  ACCESS: R. Chest permacath without surrounding erythema or edema    LABS:  Recent Results (from the past 24 hour(s))   EKG (NOW)    Collection Time: 19  7:32 AM   Result Value Ref Range    Report       [facility] Emergency Dept.    Test Date:  2019  Pt Name:    KLARISSA BRADSHAW              Department: ER  MRN:        [id number]                      Room:        02  Gender:     Male                         Technician: [id number]  :        1945                   Requested By:ER TRIAGE PROTOCOL  Order #:    [id number]                    Reading MD: ÁNGEL SMALL MD    Measurements  Intervals                                Axis  Rate:       114                          P:          0  CT:         114                          QRS:        -81  QRSD:       136                          T:          75  QT:         352  QTc:        485    Interpretive Statements  SINUS TACHYCARDIA  RIGHT BUNDLE BRANCH BLOCK  PROBABLE ANTEROSEPTAL INFARCT, AGE INDETERM  Compared to ECG 2019 11:08:19  Myocardial infarct finding now present  Sinus rhythm no longer present  Left anterior fascicular block no longer present  Electronically Signed On 2019 8:34:50 PST by ÁNGEL SMALL MD     CBC WITH DIFFERENTIAL    Collection Time: 19  7:35 AM   Result Value Ref  Range    WBC 8.6 4.8 - 10.8 K/uL    RBC 2.99 (L) 4.70 - 6.10 M/uL    Hemoglobin 9.5 (L) 14.0 - 18.0 g/dL    Hematocrit 28.4 (L) 42.0 - 52.0 %    MCV 95.0 81.4 - 97.8 fL    MCH 31.8 27.0 - 33.0 pg    MCHC 33.5 (L) 33.7 - 35.3 g/dL    RDW 47.1 35.9 - 50.0 fL    Platelet Count 411 164 - 446 K/uL    MPV 9.5 9.0 - 12.9 fL    Neutrophils-Polys 87.70 (H) 44.00 - 72.00 %    Lymphocytes 6.10 (L) 22.00 - 41.00 %    Monocytes 0.00 0.00 - 13.40 %    Eosinophils 5.30 0.00 - 6.90 %    Basophils 0.00 0.00 - 1.80 %    Nucleated RBC 0.00 /100 WBC    Neutrophils (Absolute) 7.62 (H) 1.82 - 7.42 K/uL    Lymphs (Absolute) 0.52 (L) 1.00 - 4.80 K/uL    Monos (Absolute) 0.00 0.00 - 0.85 K/uL    Eos (Absolute) 0.46 0.00 - 0.51 K/uL    Baso (Absolute) 0.00 0.00 - 0.12 K/uL    NRBC (Absolute) 0.00 K/uL    Anisocytosis 1+    COMP METABOLIC PANEL    Collection Time: 11/28/19  7:35 AM   Result Value Ref Range    Sodium 143 135 - 145 mmol/L    Potassium 3.6 3.6 - 5.5 mmol/L    Chloride 103 96 - 112 mmol/L    Co2 29 20 - 33 mmol/L    Anion Gap 11.0 0.0 - 11.9    Glucose 148 (H) 65 - 99 mg/dL    Bun 19 8 - 22 mg/dL    Creatinine 3.23 (H) 0.50 - 1.40 mg/dL    Calcium 8.1 (L) 8.5 - 10.5 mg/dL    AST(SGOT) 22 12 - 45 U/L    ALT(SGPT) 17 2 - 50 U/L    Alkaline Phosphatase 155 (H) 30 - 99 U/L    Total Bilirubin 0.8 0.1 - 1.5 mg/dL    Albumin 3.4 3.2 - 4.9 g/dL    Total Protein 6.4 6.0 - 8.2 g/dL    Globulin 3.0 1.9 - 3.5 g/dL    A-G Ratio 1.1 g/dL   TROPONIN    Collection Time: 11/28/19  7:35 AM   Result Value Ref Range    Troponin T 136 (H) 6 - 19 ng/L   LACTIC ACID    Collection Time: 11/28/19  7:35 AM   Result Value Ref Range    Lactic Acid 2.3 (H) 0.5 - 2.0 mmol/L   MORPHOLOGY    Collection Time: 11/28/19  7:35 AM   Result Value Ref Range    RBC Morphology Present     Polychromia 1+     Poikilocytosis 1+     Target Cells 1+    PERIPHERAL SMEAR REVIEW    Collection Time: 11/28/19  7:35 AM   Result Value Ref Range    Peripheral Smear Review see below     DIFFERENTIAL MANUAL    Collection Time: 11/28/19  7:35 AM   Result Value Ref Range    Bands-Stabs 0.90 0.00 - 10.00 %    Manual Diff Status PERFORMED    PLATELET ESTIMATE    Collection Time: 11/28/19  7:35 AM   Result Value Ref Range    Plt Estimation Normal    ESTIMATED GFR    Collection Time: 11/28/19  7:35 AM   Result Value Ref Range    GFR If  23 (A) >60 mL/min/1.73 m 2    GFR If Non  19 (A) >60 mL/min/1.73 m 2       (click the triangle to expand results)    IMAGING:  DX-CHEST-PORTABLE (1 VIEW)   Final Result      Enlarged cardiac silhouette with mild vascular congestion.      Persistent increased density in the left lung base could be due to atelectasis or scarring.          ASSESSMENT:  # New ESRD: [facility] HD outpatient, short session prior to arrival today  # Chest pressure: recent NSTEMI with cath 11/16.   # CAD  # hypotension: doubt intravascular depletion based on exam and imaging.    # DM II--per primary svc  # Renal Osteodystrophy/CKD Bone Mineral Disorder  # Hyperthyroidism--on methimazone  # Anemia--secondary to CKD. PRBC transfusion 11/13  # Secondary Hyperparathyroidism    Plan  -No HD today given hypotension, tentative plan for HD tomorrow with UF TBD  -FU cultures and trops  -hold home BP meds  -continue home phoslo  -ARMANDO with HD    Thank you for this consult, we will continue to follow.    Mario Hunter MD

## 2019-11-29 LAB
ALBUMIN SERPL BCP-MCNC: 2.8 G/DL (ref 3.2–4.9)
ALBUMIN/GLOB SERPL: 1.1 G/DL
ALP SERPL-CCNC: 101 U/L (ref 30–99)
ALT SERPL-CCNC: 12 U/L (ref 2–50)
ANION GAP SERPL CALC-SCNC: 10 MMOL/L (ref 0–11.9)
ANION GAP SERPL CALC-SCNC: 5 MMOL/L (ref 0–11.9)
AST SERPL-CCNC: 16 U/L (ref 12–45)
BASOPHILS # BLD AUTO: 0.6 % (ref 0–1.8)
BASOPHILS # BLD: 0.03 K/UL (ref 0–0.12)
BILIRUB SERPL-MCNC: 0.6 MG/DL (ref 0.1–1.5)
BUN SERPL-MCNC: 24 MG/DL (ref 8–22)
BUN SERPL-MCNC: 8 MG/DL (ref 8–22)
CALCIUM SERPL-MCNC: 8 MG/DL (ref 8.5–10.5)
CALCIUM SERPL-MCNC: 8.1 MG/DL (ref 8.5–10.5)
CHLORIDE SERPL-SCNC: 103 MMOL/L (ref 96–112)
CHLORIDE SERPL-SCNC: 105 MMOL/L (ref 96–112)
CO2 SERPL-SCNC: 27 MMOL/L (ref 20–33)
CO2 SERPL-SCNC: 30 MMOL/L (ref 20–33)
CREAT SERPL-MCNC: 2.04 MG/DL (ref 0.5–1.4)
CREAT SERPL-MCNC: 3.93 MG/DL (ref 0.5–1.4)
EOSINOPHIL # BLD AUTO: 0.02 K/UL (ref 0–0.51)
EOSINOPHIL NFR BLD: 0.4 % (ref 0–6.9)
ERYTHROCYTE [DISTWIDTH] IN BLOOD BY AUTOMATED COUNT: 45.1 FL (ref 35.9–50)
GLOBULIN SER CALC-MCNC: 2.5 G/DL (ref 1.9–3.5)
GLUCOSE BLD-MCNC: 105 MG/DL (ref 65–99)
GLUCOSE BLD-MCNC: 131 MG/DL (ref 65–99)
GLUCOSE BLD-MCNC: 72 MG/DL (ref 65–99)
GLUCOSE BLD-MCNC: 91 MG/DL (ref 65–99)
GLUCOSE SERPL-MCNC: 111 MG/DL (ref 65–99)
GLUCOSE SERPL-MCNC: 146 MG/DL (ref 65–99)
HCT VFR BLD AUTO: 23.1 % (ref 42–52)
HCT VFR BLD AUTO: 24.1 % (ref 42–52)
HGB BLD-MCNC: 7.8 G/DL (ref 14–18)
HGB BLD-MCNC: 7.9 G/DL (ref 14–18)
IMM GRANULOCYTES # BLD AUTO: 0.02 K/UL (ref 0–0.11)
IMM GRANULOCYTES NFR BLD AUTO: 0.4 % (ref 0–0.9)
LYMPHOCYTES # BLD AUTO: 0.89 K/UL (ref 1–4.8)
LYMPHOCYTES NFR BLD: 16.8 % (ref 22–41)
MCH RBC QN AUTO: 31.2 PG (ref 27–33)
MCHC RBC AUTO-ENTMCNC: 33.8 G/DL (ref 33.7–35.3)
MCV RBC AUTO: 92.4 FL (ref 81.4–97.8)
MONOCYTES # BLD AUTO: 0.6 K/UL (ref 0–0.85)
MONOCYTES NFR BLD AUTO: 11.3 % (ref 0–13.4)
NEUTROPHILS # BLD AUTO: 3.75 K/UL (ref 1.82–7.42)
NEUTROPHILS NFR BLD: 70.5 % (ref 44–72)
NRBC # BLD AUTO: 0 K/UL
NRBC BLD-RTO: 0 /100 WBC
PLATELET # BLD AUTO: 253 K/UL (ref 164–446)
PMV BLD AUTO: 9.3 FL (ref 9–12.9)
POTASSIUM SERPL-SCNC: 3.3 MMOL/L (ref 3.6–5.5)
POTASSIUM SERPL-SCNC: 3.7 MMOL/L (ref 3.6–5.5)
PROT SERPL-MCNC: 5.3 G/DL (ref 6–8.2)
RBC # BLD AUTO: 2.5 M/UL (ref 4.7–6.1)
SODIUM SERPL-SCNC: 138 MMOL/L (ref 135–145)
SODIUM SERPL-SCNC: 142 MMOL/L (ref 135–145)
WBC # BLD AUTO: 5.3 K/UL (ref 4.8–10.8)

## 2019-11-29 PROCEDURE — 85018 HEMOGLOBIN: CPT

## 2019-11-29 PROCEDURE — 90935 HEMODIALYSIS ONE EVALUATION: CPT

## 2019-11-29 PROCEDURE — 99222 1ST HOSP IP/OBS MODERATE 55: CPT | Performed by: INTERNAL MEDICINE

## 2019-11-29 PROCEDURE — A9270 NON-COVERED ITEM OR SERVICE: HCPCS | Performed by: INTERNAL MEDICINE

## 2019-11-29 PROCEDURE — 80048 BASIC METABOLIC PNL TOTAL CA: CPT

## 2019-11-29 PROCEDURE — 770020 HCHG ROOM/CARE - TELE (206)

## 2019-11-29 PROCEDURE — 85014 HEMATOCRIT: CPT

## 2019-11-29 PROCEDURE — 700102 HCHG RX REV CODE 250 W/ 637 OVERRIDE(OP): Performed by: INTERNAL MEDICINE

## 2019-11-29 PROCEDURE — 700105 HCHG RX REV CODE 258: Performed by: FAMILY MEDICINE

## 2019-11-29 PROCEDURE — 82962 GLUCOSE BLOOD TEST: CPT

## 2019-11-29 PROCEDURE — 96366 THER/PROPH/DIAG IV INF ADDON: CPT

## 2019-11-29 PROCEDURE — 5A1D70Z PERFORMANCE OF URINARY FILTRATION, INTERMITTENT, LESS THAN 6 HOURS PER DAY: ICD-10-PCS | Performed by: INTERNAL MEDICINE

## 2019-11-29 PROCEDURE — 85025 COMPLETE CBC W/AUTO DIFF WBC: CPT

## 2019-11-29 PROCEDURE — 700111 HCHG RX REV CODE 636 W/ 250 OVERRIDE (IP): Performed by: FAMILY MEDICINE

## 2019-11-29 PROCEDURE — A9270 NON-COVERED ITEM OR SERVICE: HCPCS | Performed by: FAMILY MEDICINE

## 2019-11-29 PROCEDURE — 700111 HCHG RX REV CODE 636 W/ 250 OVERRIDE (IP)

## 2019-11-29 PROCEDURE — 96375 TX/PRO/DX INJ NEW DRUG ADDON: CPT

## 2019-11-29 PROCEDURE — 99233 SBSQ HOSP IP/OBS HIGH 50: CPT | Performed by: FAMILY MEDICINE

## 2019-11-29 PROCEDURE — 700102 HCHG RX REV CODE 250 W/ 637 OVERRIDE(OP): Performed by: FAMILY MEDICINE

## 2019-11-29 PROCEDURE — 80053 COMPREHEN METABOLIC PANEL: CPT

## 2019-11-29 PROCEDURE — 36415 COLL VENOUS BLD VENIPUNCTURE: CPT

## 2019-11-29 RX ORDER — POTASSIUM CHLORIDE 20 MEQ/1
20 TABLET, EXTENDED RELEASE ORAL ONCE
Status: COMPLETED | OUTPATIENT
Start: 2019-11-29 | End: 2019-11-29

## 2019-11-29 RX ORDER — HEPARIN SODIUM 1000 [USP'U]/ML
INJECTION, SOLUTION INTRAVENOUS; SUBCUTANEOUS
Status: COMPLETED
Start: 2019-11-29 | End: 2019-11-29

## 2019-11-29 RX ORDER — HEPARIN SODIUM 1000 [USP'U]/ML
3700 INJECTION, SOLUTION INTRAVENOUS; SUBCUTANEOUS
Status: DISCONTINUED | OUTPATIENT
Start: 2019-11-29 | End: 2019-12-01 | Stop reason: HOSPADM

## 2019-11-29 RX ADMIN — ATORVASTATIN CALCIUM 80 MG: 80 TABLET, FILM COATED ORAL at 16:22

## 2019-11-29 RX ADMIN — HEPARIN SODIUM 3700 UNITS: 1000 INJECTION, SOLUTION INTRAVENOUS; SUBCUTANEOUS at 10:48

## 2019-11-29 RX ADMIN — PIPERACILLIN AND TAZOBACTAM 3.38 G: 3; .375 INJECTION, POWDER, LYOPHILIZED, FOR SOLUTION INTRAVENOUS; PARENTERAL at 05:00

## 2019-11-29 RX ADMIN — APIXABAN 5 MG: 5 TABLET, FILM COATED ORAL at 16:21

## 2019-11-29 RX ADMIN — CLOPIDOGREL BISULFATE 75 MG: 75 TABLET ORAL at 05:01

## 2019-11-29 RX ADMIN — Medication 667 MG: at 11:49

## 2019-11-29 RX ADMIN — Medication 667 MG: at 05:01

## 2019-11-29 RX ADMIN — OMEPRAZOLE 40 MG: 20 CAPSULE, DELAYED RELEASE ORAL at 05:01

## 2019-11-29 RX ADMIN — CALCITRIOL CAPSULES 0.25 MCG 0.25 MCG: 0.25 CAPSULE ORAL at 11:49

## 2019-11-29 RX ADMIN — METHIMAZOLE 5 MG: 5 TABLET ORAL at 05:01

## 2019-11-29 RX ADMIN — PIPERACILLIN AND TAZOBACTAM 3.38 G: 3; .375 INJECTION, POWDER, LYOPHILIZED, FOR SOLUTION INTRAVENOUS; PARENTERAL at 16:22

## 2019-11-29 RX ADMIN — Medication 667 MG: at 16:22

## 2019-11-29 RX ADMIN — POTASSIUM CHLORIDE 20 MEQ: 1500 TABLET, EXTENDED RELEASE ORAL at 06:09

## 2019-11-29 RX ADMIN — APIXABAN 5 MG: 5 TABLET, FILM COATED ORAL at 05:01

## 2019-11-29 ASSESSMENT — ENCOUNTER SYMPTOMS
COUGH: 0
BLURRED VISION: 0
ABDOMINAL PAIN: 0
FEVER: 0
DIZZINESS: 1
EYES NEGATIVE: 1
PHOTOPHOBIA: 0
HEMOPTYSIS: 0
VOMITING: 0
PALPITATIONS: 0
NAUSEA: 1
SPUTUM PRODUCTION: 0
HEARTBURN: 0
NECK PAIN: 0
BACK PAIN: 0
ORTHOPNEA: 0
CONSTITUTIONAL NEGATIVE: 1
NAUSEA: 0
DIZZINESS: 0
COLOR CHANGE: 0
TINGLING: 0
EYE PAIN: 0
DOUBLE VISION: 0
NERVOUS/ANXIOUS: 0
CONFUSION: 0
DEPRESSION: 0
MYALGIAS: 0
SHORTNESS OF BREATH: 0
CARDIOVASCULAR NEGATIVE: 1
HEADACHES: 0
RESPIRATORY NEGATIVE: 1
COUGH: 1
CHILLS: 0

## 2019-11-29 ASSESSMENT — LIFESTYLE VARIABLES: SUBSTANCE_ABUSE: 0

## 2019-11-29 NOTE — DISCHARGE PLANNING
Care Transition Team Assessment     RN LAZARO met with patient at bedside. Patient reports living with his ex-wife in a single story home and was independent with all ADL's and IADL's prior to admit. Patient drives self to appointments or is able to get a ride if needed.     Patient receives his medications at no cost when they are delivered through [facility], but if he needs to obtain from the pharmacy, patient is able to afford them.    Patient does not have an advance directive and was given booklet, but he is not sure who he would like to appoint POA at this time.     Patient is eager to discharge home.  Anticipate no needs.     Information Source  Orientation : Oriented x 4  Information Given By: Patient  Who is responsible for making decisions for patient? : Patient    Readmission Evaluation  Is this a readmission?: Yes - unplanned readmission  Why do you think you were readmitted?: chest pain at dialysis  Was an appointment arranged for you prior to discharge?: Yes, attended appointment  Were there new prescriptions you were supposed to fill after you were discharged?: Yes, prescriptions filled  Did you understand your discharge instructions?: Yes  Did you have enough support after your last discharge?: Yes    Elopement Risk  Legal Hold: No  Ambulatory or Self Mobile in Wheelchair: Yes  Disoriented: No  Psychiatric Symptoms: None  History of Wandering: No  Elopement this Admit: No  Vocalizing Wanting to Leave: No  Displays Behaviors, Body Language Wanting to Leave: No-Not at Risk for Elopement  Elopement Risk: Not at Risk for Elopement    Interdisciplinary Discharge Planning  Does Admitting Nurse Feel This Could be a Complex Discharge?: No  Primary Care Physician: Patito Edgar  Lives with - Patient's Self Care Capacity: Significant Other  Patient or legal guardian wants to designate a caregiver (see row info): No  Support Systems: Spouse / Significant Other  Housing / Facility: 1 Story House  Do You Take  your Prescribed Medications Regularly: Yes  Able to Return to Previous ADL's: Yes  Mobility Issues: No  Prior Services: None  Patient Expects to be Discharged to:: home  Assistance Needed: No  Durable Medical Equipment: Not Applicable    Discharge Preparedness  What is your plan after discharge?: Home with help  What are your discharge supports?: (ex-wife)  Prior Functional Level: Ambulatory, Drives Self, Independent with Activities of Daily Living, Independent with Medication Management  Difficulity with ADLs: None  Difficulity with IADLs: None    Functional Assesment  Prior Functional Level: Ambulatory, Drives Self, Independent with Activities of Daily Living, Independent with Medication Management    Finances  Financial Barriers to Discharge: No  Prescription Coverage: Yes    Vision / Hearing Impairment  Vision Impairment : Yes  Right Eye Vision: Impaired, Wears Glasses  Left Eye Vision: Impaired, Wears Glasses  Hearing Impairment : No    Advance Directive  Advance Directive?: None  Advance Directive offered?: AD Booklet given    Domestic Abuse  Have you ever been the victim of abuse or violence?: No  Physical Abuse or Sexual Abuse: No  Verbal Abuse or Emotional Abuse: No  Possible Abuse Reported to:: Not Applicable    Psychological Assessment  History of Substance Abuse: None  History of Psychiatric Problems: No  Non-compliant with Treatment: No    Anticipated Discharge Information  Anticipated discharge disposition: Home  Discharge Address: [address] [place] [address] [place] [address]  Discharge Contact Phone Number: [phone]

## 2019-11-29 NOTE — PROGRESS NOTES
Hospital Medicine Daily Progress Note    Date of Service  11/29/2019    Chief Complaint  73 y.o. male admitted 11/28/2019 with lightheadedness.    Hospital Course  This is a 73 years old male with past medical history of end-stage renal disease recently started on hemodialysis, history of diabetes, hypertension, history of A. fib on Eliquis and metoprolol, history of coronary artery disease with recent cardiac work-up showed 90% OM stenosis and 50% posterior descending artery stenosis with cardiology recommending to maximizing medical management was sent from dialysis center after was found to have low blood pressure.  Apparently patient recently started hemodialysis.  He continues to take his cardiac medications including metoprolol and Lasix.  He has been having lightheadedness lately when standing from seated position.  Also stated that he has been having a cough which is going for few month's as he stated.  Denies any fever chills.  Chest x-ray showed no infiltrate or consolidation but rather atelectasis.  His procalcitonin and lactic acid were elevated so patient was started on broad-spectrum antibiotics for presumed pneumonia.  Cultures has been obtained.  Nephrology consulted.  Interval Problem Update  Just returned from hemodialysis.  Feels okay.  No lightheadedness or dizziness.  Blood pressure has been stable.  Has been afebrile last night and this morning.  No sore throat.  No acute distress noted.    Consultants/Specialty  Nephrology    Code Status  Full code    Disposition  Likely home once medically cleared    Review of Systems  Review of Systems   Constitutional: Negative for chills, fever and malaise/fatigue.   HENT: Negative for ear pain, hearing loss and tinnitus.    Eyes: Negative for blurred vision, double vision and photophobia.   Respiratory: Positive for cough (Chronic). Negative for hemoptysis, sputum production and shortness of breath.    Cardiovascular: Negative for chest pain, palpitations  and orthopnea.   Gastrointestinal: Positive for nausea (Feels nauseated at the dialysis center when his blood pressure was low). Negative for heartburn and vomiting.   Genitourinary: Negative for dysuria, frequency and urgency.   Musculoskeletal: Negative for back pain, myalgias and neck pain.   Skin: Negative for rash.   Neurological: Positive for dizziness. Negative for tingling and headaches.   Psychiatric/Behavioral: Negative for depression, substance abuse and suicidal ideas.        Physical Exam  Temp:  [36.7 °C (98.1 °F)-37.6 °C (99.7 °F)] 37.6 °C (99.7 °F)  Pulse:  [79-93] 79  Resp:  [16-18] 18  BP: (134-173)/(59-87) 146/64  SpO2:  [96 %-98 %] 97 %    Physical Exam  Constitutional:       Appearance: Normal appearance.   HENT:      Head: Normocephalic and atraumatic.      Mouth/Throat:      Pharynx: No oropharyngeal exudate or posterior oropharyngeal erythema.   Eyes:      Extraocular Movements: Extraocular movements intact.      Pupils: Pupils are equal, round, and reactive to light.   Neck:      Musculoskeletal: Neck supple. No muscular tenderness.   Cardiovascular:      Rate and Rhythm: Normal rate and regular rhythm.      Heart sounds: No friction rub. No gallop.    Pulmonary:      Effort: Pulmonary effort is normal. No respiratory distress.      Breath sounds: Normal breath sounds. No stridor.   Abdominal:      General: There is no distension.      Palpations: Abdomen is soft. There is no mass.   Musculoskeletal:         General: No swelling or tenderness.   Skin:     Coloration: Skin is not jaundiced or pale.   Neurological:      General: No focal deficit present.      Mental Status: He is alert and oriented to person, place, and time.   Psychiatric:         Mood and Affect: Mood normal.         Behavior: Behavior normal.         Thought Content: Thought content normal.         Fluids    Intake/Output Summary (Last 24 hours) at 11/29/2019 1541  Last data filed at 11/29/2019 1045  Gross per 24 hour    Intake 1740 ml   Output 2650 ml   Net -910 ml       Laboratory  Recent Labs     11/28/19  0735 11/29/19  0233   WBC 8.6 5.3   RBC 2.99* 2.50*   HEMOGLOBIN 9.5* 7.8*   HEMATOCRIT 28.4* 23.1*   MCV 95.0 92.4   MCH 31.8 31.2   MCHC 33.5* 33.8   RDW 47.1 45.1   PLATELETCT 411 253   MPV 9.5 9.3     Recent Labs     11/28/19  0735 11/29/19  0233   SODIUM 143 142   POTASSIUM 3.6 3.3*   CHLORIDE 103 105   CO2 29 27   GLUCOSE 148* 111*   BUN 19 24*   CREATININE 3.23* 3.93*   CALCIUM 8.1* 8.1*                   Imaging  DX-CHEST-PORTABLE (1 VIEW)   Final Result      Enlarged cardiac silhouette with mild vascular congestion.      Persistent increased density in the left lung base could be due to atelectasis or scarring.           Assessment/Plan  Pneumonia- (present on admission)  Assessment & Plan  I do not think this patient has pneumonia.  He has a chronic cough for months since.  There was no fever chills.  Cough is nonproductive.  No recent exposure to sick contacts.  Chest x-ray with no evidence of infiltrate or consolidation.  Was given IV Zosyn.  We will de-escalate to oral antibiotics.      Hypotension- (present on admission)  Assessment & Plan  Likely orthostatic hypotension evident by patient having lightheadedness when standing up from seated position in the light of recently starting hemodialysis and taken blood pressure medications as well.  Hold metoprolol and Lasix for now  Blood pressure is normalizing.    Elevated lactic acid level- (present on admission)  Assessment & Plan  Resolved    Elevated troponin- (present on admission)  Assessment & Plan  Likely demand ischemia in the light of coronary artery disease and end-stage renal disease  Denies any chest pain  Continue Plavix, Lipitor    Paroxysmal A-fib (HCC)- (present on admission)  Assessment & Plan  Continue Eliquis  Hold metoprolol    End stage renal disease on dialysis (HCC)- (present on admission)  Assessment & Plan  Hold Lasix  Nephrology  "consulted.  Had dialysis today 11/29/2019    Diabetes mellitus type 2, controlled, with complications (HCC)- (present on admission)  Assessment & Plan  SSI    Anemia of chronic disease- (present on admission)  Assessment & Plan  Follow CBC    Essential hypertension- (present on admission)  Assessment & Plan  Hold metoprolol    CAD (coronary artery disease)- (present on admission)  Assessment & Plan  LHC - Patent coronary stents proximal mid, left anterior descending artery.Coronary artery disease involving diagonal \"jailed\" branch ostial 90% stenosis, posterior descending artery ostial 50% stenosis. 11/16/2019  Continue Plavix, Lipitor  Hold metoprolol    Hyperthyroidism- (present on admission)  Assessment & Plan  Continue Tapazole       VTE prophylaxis: Eliquis      "

## 2019-11-29 NOTE — PROGRESS NOTES
Report received from Sruthi MONTOYA. Pt care assumed. Assessment performed. Pt AOx4. Pt laying supine in bed. Pt denies pain and no signs of distress. Bed in low, locked position. Pt educated on calling to ambulate. Call light within reach. Treaded socks on pt.  Hourly rounding in place.

## 2019-11-29 NOTE — PROGRESS NOTES
Nephrology Daily Progress Note    Date of Service  11/29/2019    Chief Complaint  73-year-old male with history of new ESRD and recent end STEMI presented with chest pressure.     Recently initiated hemodialysis via right chest permacath.  Went to dialysis on Tuesday and had some symptomatic hypotension.  At home on Wednesday also had lightheadedness and son wondered if he was having a stroke.  Went to dialysis this a.m. blood pressure was very low upon starting then developed some chest pressure prompting cessation of his session after 20 minutes and transferred to ED.     In the ED initial blood pressure noted to be 90/60, tachycardic.  Blood cultures sent.  Recent Cath done on 11/16 - patent coronary stents proximal to distal, left anterior descending. 73-year-old male with history of new ESRD and recent end STEMI presented with chest pressure.     Recently initiated hemodialysis via right chest permacath.  Went to dialysis on Tuesday and had some symptomatic hypotension.  At home on Wednesday also had lightheadedness and son wondered if he was having a stroke.  Went to dialysis this a.m. blood pressure was very low upon starting then developed some chest pressure prompting cessation of his session after 20 minutes and transferred to ED.     In the ED initial blood pressure noted to be 90/60, tachycardic.  Blood cultures sent.  Recent Cath done on 11/16 - patent coronary stents proximal to distal, left anterior descending.     Interval Problem Update  11/29/19 - On dialysis without problems.  Feels fine.    Review of Systems  Review of Systems   Constitutional: Negative.    HENT: Negative.    Eyes: Negative.    Respiratory: Negative.    Cardiovascular: Negative.    Skin: Negative.         Physical Exam  Temp:  [36.7 °C (98.1 °F)-37.1 °C (98.8 °F)] 36.8 °C (98.3 °F)  Pulse:  [80-93] 86  Resp:  [16-20] 16  BP: (135-156)/(56-70) 143/70  SpO2:  [96 %-100 %] 96 %    Physical Exam  Vitals signs reviewed.    Constitutional:       Appearance: Normal appearance.   HENT:      Head: Normocephalic and atraumatic.      Nose: Nose normal.      Mouth/Throat:      Mouth: Mucous membranes are dry.   Eyes:      Extraocular Movements: Extraocular movements intact.      Pupils: Pupils are equal, round, and reactive to light.   Neck:      Musculoskeletal: Normal range of motion and neck supple.   Cardiovascular:      Rate and Rhythm: Normal rate and regular rhythm.   Pulmonary:      Effort: Pulmonary effort is normal.      Breath sounds: Normal breath sounds.   Abdominal:      General: Abdomen is flat. Bowel sounds are normal.      Palpations: Abdomen is soft.   Musculoskeletal:      Right lower leg: Edema present.      Left lower leg: Edema present.   Neurological:      Mental Status: He is alert.         Fluids    Intake/Output Summary (Last 24 hours) at 11/29/2019 0800  Last data filed at 11/29/2019 0700  Gross per 24 hour   Intake 490 ml   Output 950 ml   Net -460 ml       Laboratory  Recent Labs     11/28/19  0735 11/29/19  0233   WBC 8.6 5.3   RBC 2.99* 2.50*   HEMOGLOBIN 9.5* 7.8*   HEMATOCRIT 28.4* 23.1*   MCV 95.0 92.4   MCH 31.8 31.2   MCHC 33.5* 33.8   RDW 47.1 45.1   PLATELETCT 411 253   MPV 9.5 9.3     Recent Labs     11/28/19  0735 11/29/19  0233   SODIUM 143 142   POTASSIUM 3.6 3.3*   CHLORIDE 103 105   CO2 29 27   GLUCOSE 148* 111*   BUN 19 24*   CREATININE 3.23* 3.93*   CALCIUM 8.1* 8.1*         No results for input(s): NTPROBNP in the last 72 hours.                       ASSESSMENT:  # New ESRD: [facility] HD outpatient, short session prior to arrival today  # Chest pressure: recent NSTEMI with cath 11/16.   # CAD  # hypotension: doubt intravascular depletion based on exam and imaging.    # DM II--per primary svc  # Renal Osteodystrophy/CKD Bone Mineral Disorder  # Hyperthyroidism--on methimazone  # Anemia--secondary to CKD. PRBC transfusion 11/13  # Secondary Hyperparathyroidism     Plan  -Dialysis today  -FU cultures  and trops  -hold home BP meds  -continue home phoslo  -[facility]ANDO with HD

## 2019-11-29 NOTE — DIETARY
"Nutrition services: Day 0 of admit.  Luis Sepulveda is a 73 y.o. male with admitting DX of hypotension.  Consult received for 34+ lb wt loss over 3 months, poor PO PTA.     Spoke w pt at bedside. Upon visual inspection, pt appeared well-nourished. Pt reports UBW of 160 lb- states he was last at this wt approximately one month ago. Unsure of extent of wt loss given pt current edematous state. Pt reports PO ~1/3 of typical PTA because he \"was feeling sick\".     Pt reports his appetite is slowly improving during admit. States he did not eat breakfast because he was at dialysis, but ate most of his lunch today. Pt agreeable to afternoon and evening snacks.    Assessment:  Height: 165.1 cm (5' 5\")  Weight: 78.7 kg (173 lb 8 oz)  Body mass index is 28.87 kg/m²., BMI classification: overweight  Diet/Intake: Diabetic. PO % x 2 meals, 0% x1 meal documented.    Evaluation:   1. Pt previously admitted 11/11-11/18 after NSTEMI. Readmitted 11/28 after became hypotensive at outpt HD appointment.  2. PMH: CAD, CKD stage V, T2DM, HTN, osteoarthritis, peripheral neuropathy, former smoker  3. Labs: K 3.3, glu 111, BUN 24, Creat 3.93, GFR 15, Ca 8.1  4. MAR: calcitrol, calcium acetate, epogen, SSI, omeprazole, zosyn, bowel protocol, KCl (completed)  5. GI: LBM 11/28-medium, soft. No N/V.   6. Pt receiving HD; last HD 11/29 AM. Did not receive 11/28 scheduled HD 2/2 to hypotension.  7. Pt noted with +1 BLE edema.    Malnutrition Risk: Unable to determine risk for malnutrition at this time. Will continue to monitor.    Recommendations/Plan:  1. Encourage intake of all meals and snacks.  2. Document intake of all meals and snacks as % taken in ADL's to provide interdisciplinary communication across all shifts.   3. Monitor weight.  4. Nutrition rep will continue to see patient for ongoing meal and snack preferences.     RD following.     "

## 2019-11-29 NOTE — CARE PLAN
Problem: Communication  Goal: The ability to communicate needs accurately and effectively will improve  Outcome: PROGRESSING AS EXPECTED  Intervention: [place] patient and significant other/support system to call light to alert staff of needs  Flowsheets (Taken 11/28/2019 2025)  Oriented to:: All of the Following : Location of Bathroom, Visiting Policy, Unit Routine, Call Light and Bedside Controls, Bedside Rail Policy, Smoking Policy, Rights and Responsibilities, Bedside Report, and Patient Education Notebook     Problem: Safety  Goal: Will remain free from injury  Outcome: PROGRESSING AS EXPECTED  Intervention: Provide assistance with mobility  Flowsheets (Taken 11/28/2019 2025)  Assistance: Standby Assist

## 2019-11-29 NOTE — CARE PLAN
Problem: Nutritional:  Goal: Achieve adequate nutritional intake  Description  Patient will consume >50% of meals and snacks.   Outcome: NOT MET   PO intake 0% x1, % x2 meals documented. Added PM & HS snacks per pt request. See RD note.

## 2019-11-29 NOTE — PROGRESS NOTES
Hemodialysis ordered by Dr. Hunter. Treatment started at 0715 and ended at 1045. Pt stable, vss, no c/o post tx. See flow sheets for details. Net  ml. Reported to GORDON Degroot RN.

## 2019-11-30 LAB
GLUCOSE BLD-MCNC: 167 MG/DL (ref 65–99)
GLUCOSE BLD-MCNC: 188 MG/DL (ref 65–99)
GLUCOSE BLD-MCNC: 71 MG/DL (ref 65–99)
GLUCOSE BLD-MCNC: 85 MG/DL (ref 65–99)

## 2019-11-30 PROCEDURE — 700111 HCHG RX REV CODE 636 W/ 250 OVERRIDE (IP): Mod: JG | Performed by: INTERNAL MEDICINE

## 2019-11-30 PROCEDURE — A9270 NON-COVERED ITEM OR SERVICE: HCPCS | Performed by: FAMILY MEDICINE

## 2019-11-30 PROCEDURE — 700102 HCHG RX REV CODE 250 W/ 637 OVERRIDE(OP): Performed by: FAMILY MEDICINE

## 2019-11-30 PROCEDURE — 82962 GLUCOSE BLOOD TEST: CPT

## 2019-11-30 PROCEDURE — 5A1D70Z PERFORMANCE OF URINARY FILTRATION, INTERMITTENT, LESS THAN 6 HOURS PER DAY: ICD-10-PCS | Performed by: INTERNAL MEDICINE

## 2019-11-30 PROCEDURE — 96366 THER/PROPH/DIAG IV INF ADDON: CPT

## 2019-11-30 PROCEDURE — 700111 HCHG RX REV CODE 636 W/ 250 OVERRIDE (IP): Performed by: FAMILY MEDICINE

## 2019-11-30 PROCEDURE — 96372 THER/PROPH/DIAG INJ SC/IM: CPT

## 2019-11-30 PROCEDURE — 302196 LINEN, HYPOALLERGENIC: Performed by: FAMILY MEDICINE

## 2019-11-30 PROCEDURE — 700111 HCHG RX REV CODE 636 W/ 250 OVERRIDE (IP)

## 2019-11-30 PROCEDURE — 99233 SBSQ HOSP IP/OBS HIGH 50: CPT | Performed by: FAMILY MEDICINE

## 2019-11-30 PROCEDURE — 96376 TX/PRO/DX INJ SAME DRUG ADON: CPT

## 2019-11-30 PROCEDURE — 700105 HCHG RX REV CODE 258: Performed by: FAMILY MEDICINE

## 2019-11-30 PROCEDURE — 90935 HEMODIALYSIS ONE EVALUATION: CPT

## 2019-11-30 PROCEDURE — 770020 HCHG ROOM/CARE - TELE (206)

## 2019-11-30 RX ORDER — HEPARIN SODIUM 1000 [USP'U]/ML
INJECTION, SOLUTION INTRAVENOUS; SUBCUTANEOUS
Status: COMPLETED
Start: 2019-11-30 | End: 2019-11-30

## 2019-11-30 RX ORDER — DIPHENHYDRAMINE HCL 25 MG
25 TABLET ORAL EVERY 6 HOURS PRN
Status: DISCONTINUED | OUTPATIENT
Start: 2019-11-30 | End: 2019-12-01 | Stop reason: HOSPADM

## 2019-11-30 RX ORDER — AMOXICILLIN AND CLAVULANATE POTASSIUM 500; 125 MG/1; MG/1
1 TABLET, FILM COATED ORAL DAILY
Status: DISCONTINUED | OUTPATIENT
Start: 2019-11-30 | End: 2019-11-30

## 2019-11-30 RX ADMIN — PIPERACILLIN AND TAZOBACTAM 3.38 G: 3; .375 INJECTION, POWDER, LYOPHILIZED, FOR SOLUTION INTRAVENOUS; PARENTERAL at 05:07

## 2019-11-30 RX ADMIN — Medication 667 MG: at 14:19

## 2019-11-30 RX ADMIN — CLOPIDOGREL BISULFATE 75 MG: 75 TABLET ORAL at 05:06

## 2019-11-30 RX ADMIN — OMEPRAZOLE 40 MG: 20 CAPSULE, DELAYED RELEASE ORAL at 05:06

## 2019-11-30 RX ADMIN — METHIMAZOLE 5 MG: 5 TABLET ORAL at 05:06

## 2019-11-30 RX ADMIN — HEPARIN SODIUM 3700 UNITS: 1000 INJECTION, SOLUTION INTRAVENOUS; SUBCUTANEOUS at 13:25

## 2019-11-30 RX ADMIN — DIPHENHYDRAMINE HCL 25 MG: 25 TABLET ORAL at 17:24

## 2019-11-30 RX ADMIN — ERYTHROPOIETIN 10000 UNITS: 10000 INJECTION, SOLUTION INTRAVENOUS; SUBCUTANEOUS at 12:33

## 2019-11-30 RX ADMIN — Medication 667 MG: at 05:06

## 2019-11-30 RX ADMIN — Medication 667 MG: at 18:50

## 2019-11-30 RX ADMIN — APIXABAN 5 MG: 5 TABLET, FILM COATED ORAL at 05:07

## 2019-11-30 RX ADMIN — ATORVASTATIN CALCIUM 80 MG: 80 TABLET, FILM COATED ORAL at 17:24

## 2019-11-30 RX ADMIN — APIXABAN 5 MG: 5 TABLET, FILM COATED ORAL at 17:24

## 2019-11-30 ASSESSMENT — LIFESTYLE VARIABLES: SUBSTANCE_ABUSE: 0

## 2019-11-30 ASSESSMENT — ENCOUNTER SYMPTOMS
CHILLS: 0
NECK PAIN: 0
VOMITING: 0
HEADACHES: 0
CONSTITUTIONAL NEGATIVE: 1
COUGH: 1
BACK PAIN: 0
DOUBLE VISION: 0
RESPIRATORY NEGATIVE: 1
SENSORY CHANGE: 0
TINGLING: 0
DIZZINESS: 1
PHOTOPHOBIA: 0
SPUTUM PRODUCTION: 0
SHORTNESS OF BREATH: 0
BLURRED VISION: 0
HEARTBURN: 0
ORTHOPNEA: 0
PALPITATIONS: 0
HEMOPTYSIS: 0
FEVER: 0
TREMORS: 0
CARDIOVASCULAR NEGATIVE: 1
NAUSEA: 0
EYES NEGATIVE: 1
MYALGIAS: 0
DEPRESSION: 0

## 2019-11-30 NOTE — CARE PLAN
Problem: Communication  Goal: The ability to communicate needs accurately and effectively will improve  Outcome: PROGRESSING AS EXPECTED  Intervention: Educate patient and significant other/support system about the plan of care, procedures, treatments, medications and allow for questions  Note:   Discussed daily POC. Updated pt to time of dialysis. Pt verbalized understanding.     Problem: Bowel/Gastric:  Goal: Normal bowel function is maintained or improved  Outcome: PROGRESSING AS EXPECTED  Note:   Pt without complaints of bowel dysfunction, constipation, or abdominal distention or tenderness.

## 2019-11-30 NOTE — CONSULTS
Cardiology Initial Consultation    Date of Service  11/29/2019    Referring Physician  Emerita Chang M.D.    Reason for Consultation  Chest tightness    History of Presenting Illness  Adalid Sepulveda is a 73 y.o. male with a past medical history of CAD, previous PCI of LAD 7 years ago at [facility], ESRD recently started on HD 3 weeks ago, anemia, recent diagnosis of PAF AC on Eliquis, HTN, HLD, LAFSB and RBBB who presented 11/28/2019 transferred from dialysis unit for chest pain and shakiness.    Recently started on dialysis 3 weeks ago.  States that his blood pressure drops and during one dialysis session he passed out.  When his blood pressure drops during dialysis he gets chest pressure and tightness.    Recent cardiac catheterization showed patent LAD stents with jailed diagonal branch ostial 90% stenosis and noncritical ostial posterior descending artery disease with echocardiogram showing EF 45-50% treated medically.    Review of Systems  Review of Systems   Constitutional: Negative for chills and fever.   HENT: Negative for congestion and nosebleeds.    Eyes: Negative for pain and visual disturbance.   Respiratory: Negative for cough and shortness of breath.    Cardiovascular: Negative for chest pain, palpitations and leg swelling.   Gastrointestinal: Negative for abdominal pain, nausea and vomiting.   Genitourinary: Negative for dysuria and hematuria.   Musculoskeletal: Negative for myalgias.   Skin: Negative for color change and rash.   Neurological: Negative for dizziness and headaches.   Psychiatric/Behavioral: Negative for confusion. The patient is not nervous/anxious.        Past Medical History   has a past medical history of CAD (coronary artery disease), Chronic kidney disease (CKD), stage V (HCC), Diabetes, Hypertension, Osteoarthritis, and Peripheral neuropathy.    Surgical History   has a past surgical history that includes other cardiac surgery; appendectomy; and other orthopedic  surgery.    Family History  family history includes Alcohol/Drug in his father; Diabetes in his brother; Heart Disease in his mother; Thyroid in his son.    Social History   reports that he quit smoking about 5 years ago. He has a 55.00 pack-year smoking history. He has never used smokeless tobacco. He reports that he does not drink alcohol or use drugs.    epoetin jared, 10,000 Units, Subcutaneous, TUE+THU+SAT  apixaban, 5 mg, Oral, BID  atorvastatin, 80 mg, Oral, Q EVENING  calcitRIOL, 0.25 mcg, Oral, MO, WE + FR  calcium acetate, 667 mg, Oral, TID AC  clopidogrel, 75 mg, Oral, DAILY  methimazole, 5 mg, Oral, DAILY  omeprazole, 40 mg, Oral, DAILY  senna-docusate, 2 Tab, Oral, BID  insulin regular, 1-6 Units, Subcutaneous, 4X/DAY ACHS  piperacillin-tazobactam, 3.375 g, Intravenous, Q12HRS      Medications  Prior to Admission Medications   Prescriptions Last Dose Informant Patient Reported? Taking?   apixaban (ELIQUIS) 5mg Tab 11/27/2019 at 2100 Patient No No   Sig: Take 1 Tab by mouth 2 Times a Day for 7 days.   atorvastatin (LIPITOR) 80 MG tablet 11/27/2019 at 2100 Patient No No   Sig: Take 1 Tab by mouth every evening for 7 days.   calcitRIOL (ROCALTROL) 0.25 MCG Cap 11/27/2019 at 1800 Patient No No   Sig: Take 1 Cap by mouth every Monday, Wednesday, and Friday for 7 days.   calcium acetate (PHOS-LO) 667 MG Tab tablet NEW RX Patient No No   Sig: Take 1 Tab by mouth 3 times a day before meals for 7 days.   clopidogrel (PLAVIX) 75 MG Tab 11/27/2019 at 0800 Patient No No   Sig: Take 1 Tab by mouth every day for 7 days.   furosemide (LASIX) 40 MG Tab 11/27/2019 at 0800 Patient No No   Sig: Take 1 Tab by mouth 1 time daily as needed for up to 7 days. Indications: Edema   insulin glargine (LANTUS SOLOSTAR) 100 UNIT/ML Solution Pen-injector injection 11/27/2019 at PM Patient No No   Sig: Inject 5 Units as instructed every evening for 60 days.   methimazole (TAPAZOLE) 5 MG Tab 11/27/2019 at 0800 Patient No No   Sig: Take  1 Tab by mouth every day for 7 days.   metoprolol (LOPRESSOR) 100 MG Tab 11/27/2019 at 2100 Patient No No   Sig: Take 1 Tab by mouth 2 times a day for 7 days.   nitroglycerin (NITROSTAT) 0.4 MG SL Tab PRN at PRN Patient No No   Sig: Place 1 Tab under tongue as needed for Chest Pain (donot take if Blood pressure is less than 100/60 mm of Hg) for up to 10 doses.   pantoprazole (PROTONIX) 40 MG Tablet Delayed Response 11/27/2019 at 0800 Patient No No   Sig: Take 1 Tab by mouth every day for 7 days.      Facility-Administered Medications: None       Allergies  No Known Allergies    Vital signs in last 24 hours  Temp:  [36.7 °C (98.1 °F)-37.6 °C (99.7 °F)] 37.1 °C (98.8 °F)  Pulse:  [68-93] 68  Resp:  [16-18] 16  BP: (134-173)/(55-87) 156/55  SpO2:  [96 %-100 %] 100 %    Physical Exam  Physical Exam  Constitutional:       General: He is not in acute distress.     Comments: Elderly.  No respiratory distress.   HENT:      Head: Normocephalic.   Eyes:      General: No scleral icterus.     Conjunctiva/sclera: Conjunctivae normal.      Pupils: Pupils are equal, round, and reactive to light.   Neck:      Thyroid: No thyromegaly.      Vascular: No carotid bruit.      Comments: Normal jugular venous pressure.  Right sided Intracath.  Cardiovascular:      Rate and Rhythm: Normal rate and regular rhythm.      Pulses:           Carotid pulses are 1+ on the right side and 1+ on the left side.       Radial pulses are 1+ on the right side and 1+ on the left side.        Femoral pulses are 1+ on the left side.       Posterior tibial pulses are 1+ on the right side and 1+ on the left side.      Heart sounds: S1 normal and S2 normal. No murmur. No friction rub. No gallop.       Comments: No femoral bruits.  Pulmonary:      Effort: Pulmonary effort is normal.      Breath sounds: Normal breath sounds. No wheezing, rhonchi or rales.   Abdominal:      General: Bowel sounds are normal. There is no abdominal bruit.      Palpations: Abdomen is  soft. There is no mass or pulsatile mass.      Tenderness: There is no tenderness.   Lymphadenopathy:      Cervical: No cervical adenopathy.   Skin:     General: Skin is warm and dry.      Nails: There is no clubbing.     Neurological:      Mental Status: He is alert and oriented to person, place, and time.      Gait: Gait normal.   Psychiatric:         Behavior: Behavior normal.         Lab Review  Lab Results   Component Value Date/Time    WBC 5.3 11/29/2019 02:33 AM    RBC 2.50 (L) 11/29/2019 02:33 AM    HEMOGLOBIN 7.8 (L) 11/29/2019 02:33 AM    HEMATOCRIT 23.1 (L) 11/29/2019 02:33 AM    MCV 92.4 11/29/2019 02:33 AM    MCH 31.2 11/29/2019 02:33 AM    MCHC 33.8 11/29/2019 02:33 AM    MPV 9.3 11/29/2019 02:33 AM      Lab Results   Component Value Date/Time    SODIUM 142 11/29/2019 02:33 AM    POTASSIUM 3.3 (L) 11/29/2019 02:33 AM    CHLORIDE 105 11/29/2019 02:33 AM    CO2 27 11/29/2019 02:33 AM    GLUCOSE 111 (H) 11/29/2019 02:33 AM    BUN 24 (H) 11/29/2019 02:33 AM    CREATININE 3.93 (H) 11/29/2019 02:33 AM      Lab Results   Component Value Date/Time    ASTSGOT 22 11/28/2019 07:35 AM    ALTSGPT 17 11/28/2019 07:35 AM     Lab Results   Component Value Date/Time    CHOLSTRLTOT 74 (L) 11/23/2019 04:29 AM    LDL 40 11/23/2019 04:29 AM    HDL 15 (A) 11/23/2019 04:29 AM    TRIGLYCERIDE 97 11/23/2019 04:29 AM    TROPONINT 121 (H) 11/28/2019 10:35 PM       No results for input(s): NTPROBNP in the last 72 hours.    Cardiac Imaging and Procedures Review  EKG:  My personal interpretation of the EKG dated 11/20/2019 is sinus tachycardia, rate 114.  Left anterior fascicular block.  Right bundle branch block.    Echocardiogram: 11/22/2019  Normal left ventricular chamber size.  Left ventricular ejection fraction is visually estimated to be 45%.  Grade I diastolic dysfunction.  Moderately dilated left atrium.  Normal inferior vena cava size and inspiratory collapse.  Evidence of delayed (>5 beat) right to left shunt suggestive  "of   pulmonary arterio-venous malformation.    Cardiac Catheterization: 11/16/2019  1.  Patent coronary stents proximal mid, left anterior descending   artery.  2.  Coronary artery disease involving diagonal \"jailed\" branch   ostial 90% stenosis, posterior descending artery ostial 50% stenosis.    Imaging  Chest X-Ray: 11/20/2019Enlarged cardiac silhouette with mild vascular congestion.  Persistent increased density in the left lung base could be due to atelectasis or scarring.    Assessment/Plan  1.  Repeat angina pectoris, recurrent provoked by dialysis induced hypotension further aggravated by severe anemia.  2.  CAD.  3.  Coronary stents 2012 proximal left anterior descending artery and resultant jailed/trapped diagonal branch.  4.  Ischemic cardiomyopathy with mild left ventricular dysfunction EF 45-50%.  5.  PAF.  Diagnosed 11/2019  6.  Anticoagulation on Eliquis.  7.  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  8.  Pneumonia  9.  End-stage renal disease hemodialysis.  10.  Diabetes mellitus.  11.  Hypertension.  12.  Dyslipidemia.    Recommendation Discussion  1.  Guideline directed medical therapy for CAD and mild LV dysfunction as hemodynamically and clinically tolerated; currently limited to clopidogrel and atorvastatin.  2.  Eliquis for PAF thromboembolic prophylaxis  3.  Avoid hemodialysis induced hypotension.  4.  Correct anemia currently on Epogen.  5.  Rule out GI blood loss.  6.  On antibiotics for pneumonia.  7.  Will sign off.    Thank you for allowing me to participate in the care of this patient.    Please contact me with any questions.    Kulwant Osborn M.D.   Cardiologist, [facility]  [phone]        "

## 2019-11-30 NOTE — PROGRESS NOTES
Pt has new slurred speech. Rapid response for stroke called. NIH stroke scale complete: Negative besides slurred speech.

## 2019-11-30 NOTE — RESPIRATORY CARE
COPD EDUCATION by COPD CLINICAL EDUCATOR  11/30/2019 at 7:04 AM by Shirley Johnson     Patient reviewed by COPD education team. Patient does not have a history or diagnosis of COPD and is a non-smoker, therefore does not qualify for the COPD program.

## 2019-11-30 NOTE — CARE PLAN
Problem: Communication  Goal: The ability to communicate needs accurately and effectively will improve  Outcome: PROGRESSING AS EXPECTED     Problem: Safety  Goal: Will remain free from injury  Outcome: PROGRESSING AS EXPECTED  Note:   Fall precautions in place. Bed in low locked position. Call bell and personal items in reach. Educated patient to call for assistance before getting out of bed. Treaded socks intact. Pt mobility signaged updated.         Problem: Venous Thromboembolism (VTW)/Deep Vein Thrombosis (DVT) Prevention:  Goal: Patient will participate in Venous Thrombosis (VTE)/Deep Vein Thrombosis (DVT)Prevention Measures  Outcome: PROGRESSING AS EXPECTED  Flowsheets (Taken 11/29/2019 2026)  Pharmacologic Prophylaxis Used: Apixaban     Problem: Bowel/Gastric:  Goal: Will not experience complications related to bowel motility  Outcome: PROGRESSING AS EXPECTED     Problem: Knowledge Deficit  Goal: Knowledge of disease process/condition, treatment plan, diagnostic tests, and medications will improve  Outcome: PROGRESSING AS EXPECTED  Note:   Pt oriented to room, and unit routine.  Patient able to verbalize understanding of reason for hospitalization and is a participant in the plan of care. White board updated to reflect plan of care for current shift.           Problem: Discharge Barriers/Planning  Goal: Patient's continuum of care needs will be met  Outcome: PROGRESSING AS EXPECTED     Problem: Respiratory:  Goal: Respiratory status will improve  Outcome: PROGRESSING AS EXPECTED

## 2019-11-30 NOTE — PROGRESS NOTES
Bedside report received. Pt A&Ox4. No complaints of pain. POC discussed with pt. Pt verbalizes understanding. Call light and belongings within reach. Bed locked and in lowest position. Alarm and fall precautions in place.

## 2019-11-30 NOTE — PROGRESS NOTES
Received pt report from day RN Sruthi.  Pt awake, alert, oriented X 4.  Pt resting in bed.  Bed in low locked position, call bell at the bedside, tray table & personal belongings within reach.  Non-skid footwear intact.  White board updated to reflect plan of care for current shift.   Pt door tags reviewed.

## 2019-11-30 NOTE — PROGRESS NOTES
Nephrology Daily Progress Note    Date of Service  11/30/2019    Chief Complaint  73-year-old male with history of new ESRD and recent end STEMI presented with chest pressure.     Recently initiated hemodialysis via right chest permacath.  Went to dialysis on Tuesday and had some symptomatic hypotension.  At home on Wednesday also had lightheadedness and son wondered if he was having a stroke.  Went to dialysis this a.m. blood pressure was very low upon starting then developed some chest pressure prompting cessation of his session after 20 minutes and transferred to ED.     In the ED initial blood pressure noted to be 90/60, tachycardic.  Blood cultures sent.  Recent Cath done on 11/16 - patent coronary stents proximal to distal, left anterior descending. 73-year-old male with history of new ESRD and recent end STEMI presented with chest pressure.     Recently initiated hemodialysis via right chest permacath.  Went to dialysis on Tuesday and had some symptomatic hypotension.  At home on Wednesday also had lightheadedness and son wondered if he was having a stroke.  Went to dialysis this a.m. blood pressure was very low upon starting then developed some chest pressure prompting cessation of his session after 20 minutes and transferred to ED.     In the ED initial blood pressure noted to be 90/60, tachycardic.  Blood cultures sent.  Recent Cath done on 11/16 - patent coronary stents proximal to distal, left anterior descending.     Interval Problem Update  11/29/19 - On dialysis without problems.  Feels fine.  11/30/19 - On dialysis.  Stable.    Review of Systems  Review of Systems   Constitutional: Negative.    HENT: Negative.    Eyes: Negative.    Respiratory: Negative.    Cardiovascular: Negative.    Skin: Negative.         Physical Exam  Temp:  [36.7 °C (98 °F)-37.6 °C (99.7 °F)] 36.9 °C (98.5 °F)  Pulse:  [68-92] 92  Resp:  [16-19] 19  BP: (118-160)/(55-78) 118/66  SpO2:  [94 %-100 %] 99 %    Physical  Exam  Vitals signs reviewed.   Constitutional:       Appearance: Normal appearance.   HENT:      Head: Normocephalic and atraumatic.      Nose: Nose normal.      Mouth/Throat:      Mouth: Mucous membranes are dry.   Eyes:      Extraocular Movements: Extraocular movements intact.      Pupils: Pupils are equal, round, and reactive to light.   Neck:      Musculoskeletal: Normal range of motion and neck supple.   Cardiovascular:      Rate and Rhythm: Normal rate and regular rhythm.   Pulmonary:      Effort: Pulmonary effort is normal.      Breath sounds: Normal breath sounds.   Abdominal:      General: Abdomen is flat. Bowel sounds are normal.      Palpations: Abdomen is soft.   Musculoskeletal:      Right lower leg: Edema present.      Left lower leg: Edema present.   Neurological:      Mental Status: He is alert.         Fluids    Intake/Output Summary (Last 24 hours) at 11/30/2019 1057  Last data filed at 11/30/2019 0815  Gross per 24 hour   Intake 937.08 ml   Output --   Net 937.08 ml       Laboratory  Recent Labs     11/28/19  0735 11/29/19  0233 11/29/19  1742   WBC 8.6 5.3  --    RBC 2.99* 2.50*  --    HEMOGLOBIN 9.5* 7.8* 7.9*   HEMATOCRIT 28.4* 23.1* 24.1*   MCV 95.0 92.4  --    MCH 31.8 31.2  --    MCHC 33.5* 33.8  --    RDW 47.1 45.1  --    PLATELETCT 411 253  --    MPV 9.5 9.3  --      Recent Labs     11/28/19  0735 11/29/19  0233 11/29/19  1742   SODIUM 143 142 138   POTASSIUM 3.6 3.3* 3.7   CHLORIDE 103 105 103   CO2 29 27 30   GLUCOSE 148* 111* 146*   BUN 19 24* 8   CREATININE 3.23* 3.93* 2.04*   CALCIUM 8.1* 8.1* 8.0*         No results for input(s): NTPROBNP in the last 72 hours.                       ASSESSMENT:  # New ESRD: TTSD HD outpatient  # Chest pressure: recent NSTEMI with cath 11/16.   # CAD  # hypotension: doubt intravascular depletion based on exam and imaging.    # DM II--per primary svc  # Renal Osteodystrophy/CKD Bone Mineral Disorder  # Hyperthyroidism--on methimazone  #  Anemia--secondary to CKD. PRBC transfusion 11/13  # Secondary Hyperparathyroidism     Plan  -Dialysis again today  -FU cultures and trops  -hold home BP meds  -continue home phoslo  -ARMANDO with HD

## 2019-11-30 NOTE — CODE DOCUMENTATION
MD at bedside to evaluate. Reviewed patient's previous admissions. Does not feel this is a stroke. Primary RN comfortable with plan of care.

## 2019-12-01 VITALS
DIASTOLIC BLOOD PRESSURE: 68 MMHG | RESPIRATION RATE: 20 BRPM | BODY MASS INDEX: 26.12 KG/M2 | OXYGEN SATURATION: 100 % | SYSTOLIC BLOOD PRESSURE: 139 MMHG | HEIGHT: 65 IN | WEIGHT: 156.75 LBS | TEMPERATURE: 98.9 F | HEART RATE: 93 BPM

## 2019-12-01 PROBLEM — I95.9 HYPOTENSION: Status: RESOLVED | Noted: 2019-11-28 | Resolved: 2019-12-01

## 2019-12-01 PROBLEM — R79.89 ELEVATED LACTIC ACID LEVEL: Status: RESOLVED | Noted: 2019-11-28 | Resolved: 2019-12-01

## 2019-12-01 PROBLEM — J18.9 PNEUMONIA: Status: RESOLVED | Noted: 2019-11-28 | Resolved: 2019-12-01

## 2019-12-01 LAB
ALBUMIN SERPL BCP-MCNC: 3.2 G/DL (ref 3.2–4.9)
ALBUMIN/GLOB SERPL: 1.3 G/DL
ALP SERPL-CCNC: 112 U/L (ref 30–99)
ALT SERPL-CCNC: 19 U/L (ref 2–50)
ANION GAP SERPL CALC-SCNC: 7 MMOL/L (ref 0–11.9)
AST SERPL-CCNC: 26 U/L (ref 12–45)
BASOPHILS # BLD AUTO: 0.7 % (ref 0–1.8)
BASOPHILS # BLD: 0.03 K/UL (ref 0–0.12)
BILIRUB SERPL-MCNC: 0.5 MG/DL (ref 0.1–1.5)
BUN SERPL-MCNC: 10 MG/DL (ref 8–22)
CALCIUM SERPL-MCNC: 8.1 MG/DL (ref 8.5–10.5)
CHLORIDE SERPL-SCNC: 104 MMOL/L (ref 96–112)
CO2 SERPL-SCNC: 28 MMOL/L (ref 20–33)
CREAT SERPL-MCNC: 2.45 MG/DL (ref 0.5–1.4)
EOSINOPHIL # BLD AUTO: 0.03 K/UL (ref 0–0.51)
EOSINOPHIL NFR BLD: 0.7 % (ref 0–6.9)
ERYTHROCYTE [DISTWIDTH] IN BLOOD BY AUTOMATED COUNT: 47.6 FL (ref 35.9–50)
GLOBULIN SER CALC-MCNC: 2.5 G/DL (ref 1.9–3.5)
GLUCOSE BLD-MCNC: 135 MG/DL (ref 65–99)
GLUCOSE BLD-MCNC: 88 MG/DL (ref 65–99)
GLUCOSE SERPL-MCNC: 130 MG/DL (ref 65–99)
HCT VFR BLD AUTO: 25.5 % (ref 42–52)
HGB BLD-MCNC: 8.4 G/DL (ref 14–18)
IMM GRANULOCYTES # BLD AUTO: 0.02 K/UL (ref 0–0.11)
IMM GRANULOCYTES NFR BLD AUTO: 0.4 % (ref 0–0.9)
LYMPHOCYTES # BLD AUTO: 1.11 K/UL (ref 1–4.8)
LYMPHOCYTES NFR BLD: 24.7 % (ref 22–41)
MCH RBC QN AUTO: 31.1 PG (ref 27–33)
MCHC RBC AUTO-ENTMCNC: 32.9 G/DL (ref 33.7–35.3)
MCV RBC AUTO: 94.4 FL (ref 81.4–97.8)
MONOCYTES # BLD AUTO: 0.6 K/UL (ref 0–0.85)
MONOCYTES NFR BLD AUTO: 13.4 % (ref 0–13.4)
NEUTROPHILS # BLD AUTO: 2.7 K/UL (ref 1.82–7.42)
NEUTROPHILS NFR BLD: 60.1 % (ref 44–72)
NRBC # BLD AUTO: 0 K/UL
NRBC BLD-RTO: 0 /100 WBC
PLATELET # BLD AUTO: 279 K/UL (ref 164–446)
PMV BLD AUTO: 9.4 FL (ref 9–12.9)
POTASSIUM SERPL-SCNC: 3.9 MMOL/L (ref 3.6–5.5)
PROCALCITONIN SERPL-MCNC: 0.86 NG/ML
PROCALCITONIN SERPL-MCNC: 1.05 NG/ML
PROT SERPL-MCNC: 5.7 G/DL (ref 6–8.2)
RBC # BLD AUTO: 2.7 M/UL (ref 4.7–6.1)
SODIUM SERPL-SCNC: 139 MMOL/L (ref 135–145)
WBC # BLD AUTO: 4.5 K/UL (ref 4.8–10.8)

## 2019-12-01 PROCEDURE — 82962 GLUCOSE BLOOD TEST: CPT | Mod: 91

## 2019-12-01 PROCEDURE — 36415 COLL VENOUS BLD VENIPUNCTURE: CPT

## 2019-12-01 PROCEDURE — 80053 COMPREHEN METABOLIC PANEL: CPT

## 2019-12-01 PROCEDURE — A9270 NON-COVERED ITEM OR SERVICE: HCPCS | Performed by: FAMILY MEDICINE

## 2019-12-01 PROCEDURE — 700102 HCHG RX REV CODE 250 W/ 637 OVERRIDE(OP): Performed by: FAMILY MEDICINE

## 2019-12-01 PROCEDURE — 85025 COMPLETE CBC W/AUTO DIFF WBC: CPT

## 2019-12-01 PROCEDURE — 84145 PROCALCITONIN (PCT): CPT

## 2019-12-01 PROCEDURE — 99239 HOSP IP/OBS DSCHRG MGMT >30: CPT | Performed by: FAMILY MEDICINE

## 2019-12-01 RX ORDER — ATORVASTATIN CALCIUM 40 MG/1
40 TABLET, FILM COATED ORAL
Qty: 30 TAB | Refills: 0 | Status: ON HOLD | OUTPATIENT
Start: 2019-12-01 | End: 2021-01-01 | Stop reason: SDUPTHER

## 2019-12-01 RX ADMIN — APIXABAN 5 MG: 5 TABLET, FILM COATED ORAL at 05:16

## 2019-12-01 RX ADMIN — OMEPRAZOLE 40 MG: 20 CAPSULE, DELAYED RELEASE ORAL at 05:16

## 2019-12-01 RX ADMIN — CLOPIDOGREL BISULFATE 75 MG: 75 TABLET ORAL at 05:16

## 2019-12-01 RX ADMIN — Medication 667 MG: at 11:12

## 2019-12-01 RX ADMIN — Medication 667 MG: at 05:16

## 2019-12-01 RX ADMIN — METHIMAZOLE 5 MG: 5 TABLET ORAL at 05:16

## 2019-12-01 ASSESSMENT — ENCOUNTER SYMPTOMS
CARDIOVASCULAR NEGATIVE: 1
RESPIRATORY NEGATIVE: 1
EYES NEGATIVE: 1
CONSTITUTIONAL NEGATIVE: 1

## 2019-12-01 NOTE — PROGRESS NOTES
Nephrology Daily Progress Note    Date of Service  12/1/2019    Chief Complaint  73-year-old male with history of new ESRD and recent end STEMI presented with chest pressure.     Recently initiated hemodialysis via right chest permacath.  Went to dialysis on Tuesday and had some symptomatic hypotension.  At home on Wednesday also had lightheadedness and son wondered if he was having a stroke.  Went to dialysis this a.m. blood pressure was very low upon starting then developed some chest pressure prompting cessation of his session after 20 minutes and transferred to ED.     In the ED initial blood pressure noted to be 90/60, tachycardic.  Blood cultures sent.  Recent Cath done on 11/16 - patent coronary stents proximal to distal, left anterior descending. 73-year-old male with history of new ESRD and recent end STEMI presented with chest pressure.     Recently initiated hemodialysis via right chest permacath.  Went to dialysis on Tuesday and had some symptomatic hypotension.  At home on Wednesday also had lightheadedness and son wondered if he was having a stroke.  Went to dialysis this a.m. blood pressure was very low upon starting then developed some chest pressure prompting cessation of his session after 20 minutes and transferred to ED.     In the ED initial blood pressure noted to be 90/60, tachycardic.  Blood cultures sent.  Recent Cath done on 11/16 - patent coronary stents proximal to distal, left anterior descending.     Interval Problem Update  11/29/19 - On dialysis without problems.  Feels fine.  11/30/19 - On dialysis.  Stable.  12/01/19 - Feels fine..    Review of Systems  Review of Systems   Constitutional: Negative.    HENT: Negative.    Eyes: Negative.    Respiratory: Negative.    Cardiovascular: Negative.    Skin: Negative.         Physical Exam  Temp:  [36.8 °C (98.2 °F)-37.5 °C (99.5 °F)] 37.5 °C (99.5 °F)  Pulse:  [78-94] 88  Resp:  [16-19] 18  BP: ()/(48-74) 146/62  SpO2:  [94 %-99 %] 98  %    Physical Exam  Vitals signs reviewed.   Constitutional:       Appearance: Normal appearance.   HENT:      Head: Normocephalic and atraumatic.      Nose: Nose normal.      Mouth/Throat:      Mouth: Mucous membranes are dry.   Eyes:      Extraocular Movements: Extraocular movements intact.      Pupils: Pupils are equal, round, and reactive to light.   Neck:      Musculoskeletal: Normal range of motion and neck supple.   Cardiovascular:      Rate and Rhythm: Normal rate and regular rhythm.   Pulmonary:      Effort: Pulmonary effort is normal.      Breath sounds: Normal breath sounds.   Abdominal:      General: Abdomen is flat. Bowel sounds are normal.      Palpations: Abdomen is soft.   Musculoskeletal:      Right lower leg: Edema present.      Left lower leg: Edema present.   Neurological:      Mental Status: He is alert.         Fluids    Intake/Output Summary (Last 24 hours) at 12/1/2019 1105  Last data filed at 12/1/2019 0745  Gross per 24 hour   Intake 823.67 ml   Output 800 ml   Net 23.67 ml       Laboratory  Recent Labs     11/29/19  0233 11/29/19  1742 12/01/19  0957   WBC 5.3  --  4.5*   RBC 2.50*  --  2.70*   HEMOGLOBIN 7.8* 7.9* 8.4*   HEMATOCRIT 23.1* 24.1* 25.5*   MCV 92.4  --  94.4   MCH 31.2  --  31.1   MCHC 33.8  --  32.9*   RDW 45.1  --  47.6   PLATELETCT 253  --  279   MPV 9.3  --  9.4     Recent Labs     11/29/19  0233 11/29/19  1742   SODIUM 142 138   POTASSIUM 3.3* 3.7   CHLORIDE 105 103   CO2 27 30   GLUCOSE 111* 146*   BUN 24* 8   CREATININE 3.93* 2.04*   CALCIUM 8.1* 8.0*         No results for input(s): NTPROBNP in the last 72 hours.                       ASSESSMENT:  # New ESRD: TTS HD outpatient  # Chest pressure: recent NSTEMI with cath 11/16.   # CAD  # hypotension: doubt intravascular depletion based on exam and imaging. Resolved.    # DM II  # Renal Osteodystrophy/CKD Bone Mineral Disorder  # Hyperthyroidism--on methimazone  # Anemia--secondary to CKD. PRBC transfusion 11/13  #  Secondary hyperparathyroidism     Plan  -No dialysis today.  OK to go home off BP meds.

## 2019-12-01 NOTE — DISCHARGE INSTRUCTIONS
Discharge Instructions    Discharged to home by car with relative. Discharged via wheelchair, hospital escort: Yes.  Special equipment needed: Not Applicable    Be sure to schedule a follow-up appointment with your primary care doctor or any specialists as instructed.     Discharge Plan:   Diet Plan: Discussed  Activity Level: Discussed  Confirmed Follow up Appointment: Appointment Scheduled  Confirmed Symptoms Management: Discussed  Medication Reconciliation Updated: Yes  Influenza Vaccine Indication: Not indicated: Previously immunized this influenza season and > 8 years of age    I understand that a diet low in cholesterol, fat, and sodium is recommended for good health. Unless I have been given specific instructions below for another diet, I accept this instruction as my diet prescription.   Other diet: Diabetic     Special Instructions: None    · Is patient discharged on Warfarin / Coumadin?   No     Depression / Suicide Risk    As you are discharged from this [facility] facility, it is important to learn how to keep safe from harming yourself.    Recognize the warning signs:  · Abrupt changes in personality, positive or negative- including increase in energy   · Giving away possessions  · Change in eating patterns- significant weight changes-  positive or negative  · Change in sleeping patterns- unable to sleep or sleeping all the time   · Unwillingness or inability to communicate  · Depression  · Unusual sadness, discouragement and loneliness  · Talk of wanting to die  · Neglect of personal appearance   · Rebelliousness- reckless behavior  · Withdrawal from people/activities they love  · Confusion- inability to concentrate     If you or a loved one observes any of these behaviors or has concerns about self-harm, here's what you can do:  · Talk about it- your feelings and reasons for harming yourself  · Remove any means that you might use to hurt yourself (examples: pills, rope, extension cords,  firearm)  · Get professional help from the community (Mental Health, Substance Abuse, psychological counseling)  · Do not be alone:Call your Safe Contact- someone whom you trust who will be there for you.  · Call your local CRISIS HOTLINE 774-4893 or 637-566-4528  · Call your local Children's Mobile Crisis Response Team [place] (080) 648-7745 or www.[place]  · Call the toll free National Suicide Prevention Hotlines   · National Suicide Prevention Lifeline 183-372-AVRY (9682)  · [employer] Line Network 800-SUICIDE (885-5011)          Hypotension  As your heart beats, it forces blood through your body. This force is called blood pressure. If you have hypotension, you have low blood pressure. When your blood pressure is too low, you may not get enough blood to your brain. You may feel weak, feel light-headed, have a fast heartbeat, or even pass out (faint).  Follow these instructions at home:  Eating and drinking  · Drink enough fluids to keep your pee (urine) clear or pale yellow.  · Eat a healthy diet, and follow instructions from your doctor about eating or drinking restrictions. A healthy diet includes:  ¨ Fresh fruits and vegetables.  ¨ Whole grains.  ¨ Low-fat (lean) meats.  ¨ Low-fat dairy products.  · Eat extra salt only as told. Do not add extra salt to your diet unless your doctor tells you to.  · Eat small meals often.  · Avoid standing up quickly after you eat.  Medicines  · Take over-the-counter and prescription medicines only as told by your doctor.  ¨ Follow instructions from your doctor about changing how much you take (the dosage) of your medicines, if this applies.  ¨ Do not stop or change your medicine on your own.  General instructions  · Wear compression stockings as told by your doctor.  · Get up slowly from lying down or sitting.  · Avoid hot showers and a lot of heat as told by your doctor.  · Return to your normal activities as told by your doctor. Ask what activities are safe  for you.  · Do not use any products that contain nicotine or tobacco, such as cigarettes and e-cigarettes. If you need help quitting, ask your doctor.  · Keep all follow-up visits as told by your doctor. This is important.  Contact a doctor if:  · You throw up (vomit).  · You have watery poop (diarrhea).  · You have a fever for more than 2-3 days.  · You feel more thirsty than normal.  · You feel weak and tired.  Get help right away if:  · You have chest pain.  · You have a fast or irregular heartbeat.  · You lose feeling (get numbness) in any part of your body.  · You cannot move your arms or your legs.  · You have trouble talking.  · You get sweaty or feel light-headed.  · You faint.  · You have trouble breathing.  · You have trouble staying awake.  · You feel confused.  This information is not intended to replace advice given to you by your health care provider. Make sure you discuss any questions you have with your health care provider.  Document Released: 03/14/2011 Document Revised: 09/05/2017 Document Reviewed: 09/05/2017  [employer] Interactive Patient Education © 2017 [employer]        Community-Acquired Pneumonia, Adult  Introduction  Pneumonia is an infection of the lungs. One type of pneumonia can happen while a person is in a hospital. A different type can happen when a person is not in a hospital (community-acquired pneumonia). It is easy for this kind to spread from person to person. It can spread to you if you breathe near an infected person who coughs or sneezes. Some symptoms include:  · A dry cough.  · A wet (productive) cough.  · Fever.  · Sweating.  · Chest pain.  Follow these instructions at home:  · Take over-the-counter and prescription medicines only as told by your doctor.  ¨ Only take cough medicine if you are losing sleep.  ¨ If you were prescribed an antibiotic medicine, take it as told by your doctor. Do not stop taking the antibiotic even if you start to feel better.  · Sleep with  your head and neck raised (elevated). You can do this by putting a few pillows under your head, or you can sleep in a recliner.  · Do not use tobacco products. These include cigarettes, chewing tobacco, and e-cigarettes. If you need help quitting, ask your doctor.  · Drink enough water to keep your pee (urine) clear or pale yellow.  A shot (vaccine) can help prevent pneumonia. Shots are often suggested for:  · People older than 65 years of age.  · People older than 19 years of age:  ¨ Who are having cancer treatment.  ¨ Who have long-term (chronic) lung disease.  ¨ Who have problems with their body's defense system (immune system).  You may also prevent pneumonia if you take these actions:  · Get the flu (influenza) shot every year.  · Go to the dentist as often as told.  · Wash your hands often. If soap and water are not available, use hand .  Contact a doctor if:  · You have a fever.  · You lose sleep because your cough medicine does not help.  Get help right away if:  · You are short of breath and it gets worse.  · You have more chest pain.  · Your sickness gets worse. This is very serious if:  ¨ You are an older adult.  ¨ Your body's defense system is weak.  · You cough up blood.  This information is not intended to replace advice given to you by your health care provider. Make sure you discuss any questions you have with your health care provider.  Document Released: 06/05/2009 Document Revised: 05/25/2017 Document Reviewed: 04/13/2016  © 2017 [employer]

## 2019-12-01 NOTE — CARE PLAN
Problem: Communication  Goal: The ability to communicate needs accurately and effectively will improve  Outcome: PROGRESSING AS EXPECTED  Note:   Speech clear,      Problem: Safety  Goal: Will remain free from falls  Outcome: PROGRESSING AS EXPECTED  Note:   Hourly rounding implemented. Call bell in reach     Problem: Knowledge Deficit  Goal: Knowledge of the prescribed therapeutic regimen will improve  Outcome: PROGRESSING AS EXPECTED  Note:   Careplan reviewed      Problem: Respiratory:  Goal: Respiratory status will improve  Outcome: PROGRESSING AS EXPECTED  Note:   LS diminished clear no sob. RA

## 2019-12-01 NOTE — PROGRESS NOTES
Patient being discharged. Patient educated on discharge instructions and new prescriptions. Patient verbalized understanding. Follow up appt made. PIV removed, telemetry monitor checked in. Patient going home with relative. RN to escort out. Will monitor until patient is off unit.

## 2019-12-01 NOTE — CARE PLAN
Problem: Communication  Goal: The ability to communicate needs accurately and effectively will improve  Outcome: PROGRESSING AS EXPECTED  Intervention: Educate patient and significant other/support system about the plan of care, procedures, treatments, medications and allow for questions  Note:   Discussed daily POC. Pt asked appropriate questions, verbalized understanding     Problem: Safety  Goal: Will remain free from falls  Outcome: PROGRESSING AS EXPECTED  Intervention: Assess risk factors for falls  Note:   Pt mobility assessed at start of shift. Pt able to be up self, steady on feet with steady gait, no complaints of dizziness. Pt educated to fall risk. Pt calls appropriately for assistance.

## 2019-12-01 NOTE — PROGRESS NOTES
Assumed care at 1900, bedside report received from Tamiko MONTOYA. Pt. Is NSR on the monitor. Initial assessment completed, orders reviewed, call light within reach, bed alarm  in use, and hourly rounding in place. POC addressed with patient, no additional questions at this time.

## 2019-12-01 NOTE — PROGRESS NOTES
Hospital Medicine Daily Progress Note    Date of Service  11/30/2019    Chief Complaint  73 y.o. male admitted 11/28/2019 with lightheadedness.    Hospital Course  This is a 73 years old male with past medical history of end-stage renal disease recently started on hemodialysis, history of diabetes, hypertension, history of A. fib on Eliquis and metoprolol, history of coronary artery disease with recent cardiac work-up showed 90% OM stenosis and 50% posterior descending artery stenosis with cardiology recommending to maximizing medical management was sent from dialysis center after was found to have low blood pressure.  Apparently patient recently started hemodialysis.  He continues to take his cardiac medications including metoprolol and Lasix.  He has been having lightheadedness lately when standing from seated position.  Also stated that he has been having a cough which is going for few month's as he stated.  Denies any fever chills.  Chest x-ray showed no infiltrate or consolidation but rather atelectasis.  His procalcitonin and lactic acid were elevated so patient was started on broad-spectrum antibiotics for presumed pneumonia.  Cultures has been obtained.  Nephrology consulted.  Interval Problem Update  Just returned from hemodialysis.  Feels okay.  No lightheadedness or dizziness.  Blood pressure has been stable.  Has been afebrile last night and this morning.  No sore throat.  No acute distress noted.  11/30: Resting comfortably in bed.  Denies any chest pain or shortness of breath.  Going for hemodialysis.  Had episode of slurred speech that lasted few seconds and then resolved yesterday.  Her recent work-up for TIA with negative MRI of the brain.  Neurology felt that patient symptoms are related to encephalopathy based on recent consultation.  No issues overnight per staff.  Consultants/Specialty  Nephrology    Code Status  Full code    Disposition  Likely home once medically cleared    Review of  Systems  Review of Systems   Constitutional: Negative for chills, fever and malaise/fatigue.   HENT: Negative for ear pain, hearing loss and tinnitus.    Eyes: Negative for blurred vision, double vision and photophobia.   Respiratory: Positive for cough (Chronic). Negative for hemoptysis, sputum production and shortness of breath.    Cardiovascular: Negative for chest pain, palpitations and orthopnea.   Gastrointestinal: Negative for heartburn, nausea (Feels nauseated at the dialysis center when his blood pressure was low) and vomiting.   Genitourinary: Negative for dysuria, frequency and urgency.   Musculoskeletal: Negative for back pain, myalgias and neck pain.   Skin: Negative for rash.   Neurological: Positive for dizziness. Negative for tingling, tremors, sensory change and headaches.   Psychiatric/Behavioral: Negative for depression, substance abuse and suicidal ideas.        Physical Exam  Temp:  [36.7 °C (98 °F)-37.4 °C (99.4 °F)] 37.4 °C (99.3 °F)  Pulse:  [77-93] 93  Resp:  [16-19] 19  BP: ()/(48-78) 82/48  SpO2:  [94 %-99 %] 99 %    Physical Exam  Constitutional:       Appearance: Normal appearance.   HENT:      Head: Normocephalic and atraumatic.      Mouth/Throat:      Pharynx: No oropharyngeal exudate or posterior oropharyngeal erythema.   Eyes:      Extraocular Movements: Extraocular movements intact.      Pupils: Pupils are equal, round, and reactive to light.   Neck:      Musculoskeletal: Neck supple. No muscular tenderness.   Cardiovascular:      Rate and Rhythm: Normal rate and regular rhythm.      Heart sounds: No friction rub. No gallop.    Pulmonary:      Effort: Pulmonary effort is normal. No respiratory distress.      Breath sounds: Normal breath sounds. No stridor.   Abdominal:      General: There is no distension.      Palpations: Abdomen is soft. There is no mass.   Musculoskeletal:         General: No swelling or tenderness.   Skin:     Coloration: Skin is not jaundiced or pale.    Neurological:      General: No focal deficit present.      Mental Status: He is alert and oriented to person, place, and time.   Psychiatric:         Mood and Affect: Mood normal.         Behavior: Behavior normal.         Thought Content: Thought content normal.         Fluids    Intake/Output Summary (Last 24 hours) at 11/30/2019 1727  Last data filed at 11/30/2019 1500  Gross per 24 hour   Intake 1158.25 ml   Output 500 ml   Net 658.25 ml       Laboratory  Recent Labs     11/28/19  0735 11/29/19  0233 11/29/19  1742   WBC 8.6 5.3  --    RBC 2.99* 2.50*  --    HEMOGLOBIN 9.5* 7.8* 7.9*   HEMATOCRIT 28.4* 23.1* 24.1*   MCV 95.0 92.4  --    MCH 31.8 31.2  --    MCHC 33.5* 33.8  --    RDW 47.1 45.1  --    PLATELETCT 411 253  --    MPV 9.5 9.3  --      Recent Labs     11/28/19  0735 11/29/19  0233 11/29/19  1742   SODIUM 143 142 138   POTASSIUM 3.6 3.3* 3.7   CHLORIDE 103 105 103   CO2 29 27 30   GLUCOSE 148* 111* 146*   BUN 19 24* 8   CREATININE 3.23* 3.93* 2.04*   CALCIUM 8.1* 8.1* 8.0*                   Imaging  DX-CHEST-PORTABLE (1 VIEW)   Final Result      Enlarged cardiac silhouette with mild vascular congestion.      Persistent increased density in the left lung base could be due to atelectasis or scarring.           Assessment/Plan  Pneumonia- (present on admission)  Assessment & Plan  I do not think this patient has pneumonia.  He has a chronic cough for months since.  There was no fever chills.  Cough is nonproductive.  No recent exposure to sick contacts.  Chest x-ray with no evidence of infiltrate or consolidation.  Was started IV Zosyn.  I will stop antibiotics.       Hypotension- (present on admission)  Assessment & Plan  Likely orthostatic hypotension evident by patient having lightheadedness when standing up from seated position in the light of recently starting hemodialysis and taken blood pressure medications as well.  Hold metoprolol and Lasix for now  Blood pressure is normalizing.  11/30:  "Hypotensive today after hemodialysis.  Holding his blood pressure medications for now.    Elevated lactic acid level- (present on admission)  Assessment & Plan  Resolved    Elevated troponin- (present on admission)  Assessment & Plan  Likely demand ischemia in the light of coronary artery disease and end-stage renal disease  Denies any chest pain  Cardiology following.  Continue current medications as hemodynamically tolerates.    Paroxysmal A-fib (HCC)- (present on admission)  Assessment & Plan  Continue Eliquis  Hold metoprolol    End stage renal disease on dialysis (HCC)- (present on admission)  Assessment & Plan  Hold Lasix  Nephrology consulted.  Had dialysis today 11/29/2019    Diabetes mellitus type 2, controlled, with complications ([facility])- (present on admission)  Assessment & Plan  SSI    Anemia of chronic disease- (present on admission)  Assessment & Plan  Follow CBC    Essential hypertension- (present on admission)  Assessment & Plan  Hold metoprolol    CAD (coronary artery disease)- (present on admission)  Assessment & Plan  LHC - Patent coronary stents proximal mid, left anterior descending artery.Coronary artery disease involving diagonal \"jailed\" branch ostial 90% stenosis, posterior descending artery ostial 50% stenosis. 11/16/2019  Continue Plavix, Lipitor  Hold metoprolol    Hyperthyroidism- (present on admission)  Assessment & Plan  Continue Tapazole  Plan of care discussed with multidisciplinary team during rounds.    VTE prophylaxis: Eliquis      "

## 2019-12-02 ENCOUNTER — OFFICE VISIT (OUTPATIENT)
Dept: CARDIOLOGY | Facility: MEDICAL CENTER | Age: 74
End: 2019-12-02
Payer: MEDICARE

## 2019-12-02 VITALS
DIASTOLIC BLOOD PRESSURE: 50 MMHG | WEIGHT: 159 LBS | SYSTOLIC BLOOD PRESSURE: 104 MMHG | BODY MASS INDEX: 25.55 KG/M2 | HEART RATE: 100 BPM | HEIGHT: 66 IN | OXYGEN SATURATION: 100 %

## 2019-12-02 DIAGNOSIS — N18.6 END STAGE RENAL DISEASE ON DIALYSIS (HCC): ICD-10-CM

## 2019-12-02 DIAGNOSIS — I25.10 CORONARY ARTERY DISEASE DUE TO LIPID RICH PLAQUE: ICD-10-CM

## 2019-12-02 DIAGNOSIS — I10 ESSENTIAL HYPERTENSION: ICD-10-CM

## 2019-12-02 DIAGNOSIS — Z79.4 CONTROLLED TYPE 2 DIABETES MELLITUS WITH COMPLICATION, WITH LONG-TERM CURRENT USE OF INSULIN (HCC): ICD-10-CM

## 2019-12-02 DIAGNOSIS — E78.5 DYSLIPIDEMIA: ICD-10-CM

## 2019-12-02 DIAGNOSIS — I48.0 PAROXYSMAL A-FIB (HCC): ICD-10-CM

## 2019-12-02 DIAGNOSIS — E86.1 HYPOTENSION DUE TO HYPOVOLEMIA: ICD-10-CM

## 2019-12-02 DIAGNOSIS — I25.83 CORONARY ARTERY DISEASE DUE TO LIPID RICH PLAQUE: ICD-10-CM

## 2019-12-02 DIAGNOSIS — I95.89 HYPOTENSION DUE TO HYPOVOLEMIA: ICD-10-CM

## 2019-12-02 DIAGNOSIS — D63.8 ANEMIA OF CHRONIC DISEASE: ICD-10-CM

## 2019-12-02 DIAGNOSIS — Z99.2 END STAGE RENAL DISEASE ON DIALYSIS (HCC): ICD-10-CM

## 2019-12-02 DIAGNOSIS — E11.8 CONTROLLED TYPE 2 DIABETES MELLITUS WITH COMPLICATION, WITH LONG-TERM CURRENT USE OF INSULIN (HCC): ICD-10-CM

## 2019-12-02 PROCEDURE — 99214 OFFICE O/P EST MOD 30 MIN: CPT | Performed by: NURSE PRACTITIONER

## 2019-12-02 RX ORDER — CLOPIDOGREL BISULFATE 75 MG/1
75 TABLET ORAL DAILY
Status: ON HOLD | COMMUNITY
End: 2019-12-11 | Stop reason: SDUPTHER

## 2019-12-02 RX ORDER — PANTOPRAZOLE SODIUM 40 MG/1
40 TABLET, DELAYED RELEASE ORAL
Status: ON HOLD | COMMUNITY
End: 2021-01-01

## 2019-12-02 ASSESSMENT — ENCOUNTER SYMPTOMS
FEVER: 0
ALTERED MENTAL STATUS: 0
DECREASED APPETITE: 0
DIZZINESS: 1
COUGH: 0
WEAKNESS: 1
NEAR-SYNCOPE: 0
SHORTNESS OF BREATH: 0
ABDOMINAL PAIN: 0
NAUSEA: 0
BRUISES/BLEEDS EASILY: 0
SYNCOPE: 0
BLOATING: 0
BLURRED VISION: 1
DYSPNEA ON EXERTION: 1
ORTHOPNEA: 0

## 2019-12-02 NOTE — ASSESSMENT & PLAN NOTE
He is receiving Epogen.  His anemia is concerning in the setting of hypotension and syncope with dialysis.   Transfusion may be therapeutic, encouraged patient to discuss with his nephrology team.

## 2019-12-02 NOTE — PROGRESS NOTES
"      Cardiology Clinic Follow-up Note    Date of note:    12/2/2019    Primary Care Provider: Patito Edgar M.D.    Name:             Luis Sepulveda  YOB: 1945  MRN:               [id number]    CC: hypotension, recent hospitalization, CAD    Patient HPI:   Luis Sepulveda is a 73 y.o. male with PMH including has PMH including CAD with hx of stents to the LAD at [facility] in the past.  He has ESRD and was recently started on hemodialysis in early 11/2019.  He has longstanding anemia of chronic disease, paroxysmal Afib on Eliquis, and DM2.      He has had multiple admissions to [facility] in the past 3-4 weeks with issues such as chest pain, shortness of breath, syncope during dialysis and most recently had AMS, weakness, dysarthria.  He notes he still feels \"foggy\" and just not himself.  MRI ruled out CVA.  He also underwent LCH on 11/16/19 finding patent LAD stents to prox and mid vessel with jailed diagonal and non-critical PDA lesion.      Interim History:  Mr. Sepulveda presents today for hospital follow up.  He still complains of feeling \"foggy.\"  Last HD was in the hospital on 11/30/19.  He denies having CP or syncope during that treatment.  He plans to get back to his T, Th, Sa HD routine.  Next HD treatment tomorrow.      Review of Systems   Constitution: Positive for malaise/fatigue. Negative for decreased appetite and fever.   HENT:        Dry mouth, difficulty swallowing.   Eyes: Positive for blurred vision.   Cardiovascular: Positive for dyspnea on exertion. Negative for chest pain, leg swelling, near-syncope, orthopnea and syncope.   Respiratory: Negative for cough and shortness of breath.    Hematologic/Lymphatic: Does not bruise/bleed easily.   Skin: Negative for rash.   Gastrointestinal: Negative for bloating, abdominal pain and nausea.   Neurological: Positive for dizziness and weakness.   Psychiatric/Behavioral: Negative for altered mental status.       Past Medical " "History:   Diagnosis Date   • CAD (coronary artery disease)     stents   • Chronic kidney disease (CKD), stage V (HCC)    • Diabetes    • Hypertension    • Osteoarthritis    • Peripheral neuropathy      Family History   Problem Relation Age of Onset   • Heart Disease Mother    • Alcohol/Drug Father    • Thyroid Son    • Diabetes Brother    • Hypertension Neg Hx    • Hyperlipidemia Neg Hx      Social History     Tobacco Use   • Smoking status: Former Smoker     Packs/day: 1.00     Years: 55.00     Pack years: 55.00     Last attempt to quit: 2014     Years since quittin.8   • Smokeless tobacco: Never Used   Substance Use Topics   • Alcohol use: No   • Drug use: No       Current Outpatient Medications   Medication Sig Dispense Refill   • clopidogrel (PLAVIX) 75 MG Tab Take 75 mg by mouth every day.     • pantoprazole (PROTONIX) 40 MG Tablet Delayed Response Take 40 mg by mouth every day.     • calcium acetate (PHOS-LO) 667 MG Cap Take 1,334 mg by mouth 3 times a day, with meals.     • atorvastatin (LIPITOR) 40 MG Tab Take 1 Tab by mouth every bedtime. 30 Tab 0   • apixaban (ELIQUIS) 5mg Tab Take 0.5 Tabs by mouth 2 Times a Day. 60 Tab 0   • calcitRIOL (ROCALTROL) 0.25 MCG Cap Take 1 Cap by mouth every Monday, Wednesday, and Friday for 7 days. 3 Cap 0   • insulin glargine (LANTUS SOLOSTAR) 100 UNIT/ML Solution Pen-injector injection Inject 5 Units as instructed every evening for 60 days. 3 mL 0     No current facility-administered medications for this visit.        No Known Allergies      Physical Exam:  Ambulatory Vitals  /50 (BP Location: Left arm, Patient Position: Sitting)   Pulse 100   Ht 1.664 m (5' 5.5\")   Wt 72.1 kg (159 lb)   SpO2 100%    BP Readings from Last 4 Encounters:   19 104/50   19 139/68   19 109/43   19 130/68       Weight/BMI: Body mass index is 26.06 kg/m².  Wt Readings from Last 4 Encounters:   19 72.1 kg (159 lb)   19 71.1 kg (156 lb 12 oz)   "   11/22/19 72.3 kg (159 lb 6.3 oz)   11/17/19 72.3 kg (159 lb 6.3 oz)       General: no apparent distress  Eyes: normal conjunctiva  ENT: OP clear  Neck: no JVD   Lungs: normal respiratory effort, clear without crackles, no wheezing or rhonchi.  Chest/Heart: dialysis port to upper R chest.  normal rate, regular rhythm, no murmurs, no rubs or gallops,   Ext:  1 + R ankle edema.  No edema on the L. no cyanosis.  Abdomen: soft, non tender, non distended,  Neurological: No focal deficits, no facial asymmetry.  Normal speech.  Psychiatric: Appropriate affect, alert and oriented x 3.   Skin: Warm extremities, no rash.      Lab Data Review:  Lab Results   Component Value Date/Time    CHOLSTRLTOT 74 (L) 11/23/2019 04:29 AM    LDL 40 11/23/2019 04:29 AM    HDL 15 (A) 11/23/2019 04:29 AM    TRIGLYCERIDE 97 11/23/2019 04:29 AM       Lab Results   Component Value Date/Time    SODIUM 139 12/01/2019 09:57 AM    POTASSIUM 3.9 12/01/2019 09:57 AM    CHLORIDE 104 12/01/2019 09:57 AM    CO2 28 12/01/2019 09:57 AM    GLUCOSE 130 (H) 12/01/2019 09:57 AM    BUN 10 12/01/2019 09:57 AM    CREATININE 2.45 (H) 12/01/2019 09:57 AM     Lab Results   Component Value Date/Time    ALKPHOSPHAT 112 (H) 12/01/2019 09:57 AM    ASTSGOT 26 12/01/2019 09:57 AM    ALTSGPT 19 12/01/2019 09:57 AM    TBILIRUBIN 0.5 12/01/2019 09:57 AM      Lab Results   Component Value Date/Time    WBC 4.5 (L) 12/01/2019 09:57 AM     No components found for: HBGA1C      Cardiac Imaging and Procedures Review:      [facility]cho 11/22/19:  CONCLUSIONS  No prior study is available for comparison.   Normal left ventricular chamber size.  Left ventricular ejection fraction is visually estimated to be 45%.  Grade I diastolic dysfunction.  Moderately dilated left atrium.  Normal inferior vena cava size and inspiratory collapse.  Evidence of delayed (>5 beat) right to left shunt suggestive of   pulmonary arterio-venous malformation.    [facility] 11/16/19:  POSTPROCEDURE DIAGNOSES:  1.  Patent  "coronary stents proximal mid, left anterior descending   artery.  2.  Coronary artery disease involving diagonal \"jailed\" branch   ostial 90% stenosis, posterior descending artery ostial 50% stenosis.        Assessment and Clinical Decision Makin.  Hypotension due to hypovolemia  I highly suspect his hypotension is secondary to hypovolemia.   Encouraged increase fluid intake and more sodium in the diet.   Also asked him to discuss blood transfusion with his nehprologist as I think this may be very helpful.  He can take his BP twice daily and call use with concerning symptoms or SBP less than 90.    2.  CAD (coronary artery disease)  Continues Plavix and high intensity statin   Currently BP will not allow for BB.     3.  Essential hypertension  BP low at this time.   All antihypertensives held.    4.  Anemia of chronic disease  He is receiving Epogen.   hgb 8.4  His anemia is concerning in the setting of hypotension and syncope with dialysis.   Transfusion may be therapeutic, encouraged patient to discuss with his nephrology team.    5.  Diabetes mellitus type 2, controlled, with complications ([facility])  Continues insulin per primary team.    6.  End stage renal disease on dialysis (HCC)  HD - T, Th, Sa  Failed AVF on the RUE  Has temp HD cath to the subclavian.    7.  Paroxysmal A-fib (HCC)  RRR today, [id number], on Eliquis 5 mg BID.     8.  Dyslipidemia  Continue Lipitor 40.      Future Appointments   Date Time Provider Department Center   2020  2:20 PM Kulwant Osborn M.D. [facility] None         Tamiko Hankins PA-C  2019    [facility]  [facility], [address]  [address] [address]  Phone: [phone]  Fax: [phone]    Collaborating Physician: Dr. Osborn    "

## 2019-12-02 NOTE — DISCHARGE SUMMARY
Discharge Summary    CHIEF COMPLAINT ON ADMISSION  Chief Complaint   Patient presents with   • Chest Pain     pt experienced CP during dialysis. relieved pta. asa give pta    • Shaking     pt states same s/s during his last outpt dialysis treatment        Reason for Admission  EMS R=2     Admission Date  11/28/2019    CODE STATUS  Prior    HPI & HOSPITAL COURSE  This is a 73 years old male with past medical history of end-stage renal disease recently started on hemodialysis, history of diabetes, hypertension, history of A. fib on Eliquis and metoprolol, history of coronary artery disease with recent cardiac work-up showed 90% OM stenosis and 50% posterior descending artery stenosis with cardiology recommending to maximizing medical management was sent from dialysis center after was found to have low blood pressure.  Apparently patient recently started hemodialysis.  He continues to take his cardiac medications including metoprolol and Lasix.  He has been having lightheadedness lately when standing from seated position.  Also stated that he has been having a cough which is going for few month's as he stated.  Denies any fever chills.  Chest x-ray showed no infiltrate or consolidation but rather atelectasis.  His procalcitonin and lactic acid were elevated so patient was started on broad-spectrum antibiotics for presumed pneumonia.  Cultures has been obtained.  Nephrology consulted.  Patient received dialysis during this hospital stay.  Cardiology has evaluated the patient.  Held most of his cardiac medications due to hypotension.  Just recommended to continue Plavix and statin.  Has been hemodynamically clinically stable with blood pressure has been stable as well.  He was cleared for discharge from medical standpoint.       Therefore, he is discharged in guarded and stable condition to home with close outpatient follow-up.    The patient met 2-midnight criteria for an inpatient stay at the time of  discharge.    Discharge Date  12/1/2019    FOLLOW UP ITEMS POST DISCHARGE  Follow-up with PCP in 1 week  Follow-up with nephrology as per recommendations  Follow-up with hemodialysis center as per schedule.  Follow-up with cardiology as per recommendation    DISCHARGE DIAGNOSES  Active Problems:    CAD (coronary artery disease) POA: Yes    Essential hypertension POA: Yes    Anemia of chronic disease POA: Yes    Diabetes mellitus type 2, controlled, with complications (HCC) POA: Yes    End stage renal disease on dialysis (HCC) POA: Yes    Paroxysmal A-fib (HCC) POA: Yes    Elevated troponin POA: Yes    Hyperthyroidism POA: Yes  Resolved Problems:    Hypotension POA: Yes    Pneumonia POA: Yes    Elevated lactic acid level POA: Yes      FOLLOW UP  Future Appointments   Date Time Provider Department Center   12/2/2019 10:45 AM RENATA Callahan None     No follow-up provider specified.    MEDICATIONS ON DISCHARGE     Medication List      CHANGE how you take these medications      Instructions   apixaban 5mg Tabs  What changed:  how much to take  Commonly known as:  ELIQUIS   Take 0.5 Tabs by mouth 2 Times a Day.  Dose:  2.5 mg     atorvastatin 40 MG Tabs  What changed:    · medication strength  · how much to take  · when to take this  Commonly known as:  LIPITOR   Take 1 Tab by mouth every bedtime.  Dose:  40 mg        CONTINUE taking these medications      Instructions   calcitRIOL 0.25 MCG Caps  Commonly known as:  ROCALTROL   Take 1 Cap by mouth every Monday, Wednesday, and Friday for 7 days.  Dose:  0.25 mcg     calcium acetate 667 MG Tabs tablet  Commonly known as:  PHOS-LO   Take 1 Tab by mouth 3 times a day before meals for 7 days.  Dose:  667 mg     clopidogrel 75 MG Tabs  Commonly known as:  PLAVIX   Take 1 Tab by mouth every day for 7 days.  Dose:  75 mg     insulin glargine 100 UNIT/ML Sopn injection  Commonly known as:  LANTUS SOLOSTAR   Inject 5 Units as instructed every evening for 60  days.  Dose:  5 Units     methimazole 5 MG Tabs  Commonly known as:  TAPAZOLE   Take 1 Tab by mouth every day for 7 days.  Dose:  5 mg     pantoprazole 40 MG Tbec  Commonly known as:  PROTONIX   Take 1 Tab by mouth every day for 7 days.  Dose:  40 mg        STOP taking these medications    furosemide 40 MG Tabs  Commonly known as:  LASIX     metoprolol 100 MG Tabs  Commonly known as:  LOPRESSOR     nitroglycerin 0.4 MG Subl  Commonly known as:  NITROSTAT            Allergies  No Known Allergies    DIET  No orders of the defined types were placed in this encounter.      ACTIVITY  As tolerated.  Weight bearing as tolerated    CONSULTATIONS  Nephrology  Cardiology    PROCEDURES  None    LABORATORY  Lab Results   Component Value Date    SODIUM 139 12/01/2019    POTASSIUM 3.9 12/01/2019    CHLORIDE 104 12/01/2019    CO2 28 12/01/2019    GLUCOSE 130 (H) 12/01/2019    BUN 10 12/01/2019    CREATININE 2.45 (H) 12/01/2019        Lab Results   Component Value Date    WBC 4.5 (L) 12/01/2019    HEMOGLOBIN 8.4 (L) 12/01/2019    HEMATOCRIT 25.5 (L) 12/01/2019    PLATELETCT 279 12/01/2019        Total time of the discharge process exceeds 40 minutes.

## 2019-12-02 NOTE — ASSESSMENT & PLAN NOTE
I highly suspect his hypotension is secondary to hypovolemia.   Encouraged increase fluid intake and more sodium in the diet.   Also asked him to discuss blood transfusion with his nehprologist as I think this may be very helpful.  He can take his BP twice daily and call use with concerning symptoms or SBP less than 90.

## 2019-12-03 LAB
BACTERIA BLD CULT: NORMAL
BACTERIA BLD CULT: NORMAL
SIGNIFICANT IND 70042: NORMAL
SIGNIFICANT IND 70042: NORMAL
SITE SITE: NORMAL
SITE SITE: NORMAL
SOURCE SOURCE: NORMAL
SOURCE SOURCE: NORMAL

## 2019-12-06 DIAGNOSIS — I48.0 PAROXYSMAL A-FIB (HCC): ICD-10-CM

## 2019-12-06 NOTE — PROGRESS NOTES
Patient discharged from hospital recently.  On Eliquis.  S/w PharmD Marcial.  Patient needs monitoring.     Referral to [facility] clinic intiaited

## 2019-12-10 ENCOUNTER — HOSPITAL ENCOUNTER (OUTPATIENT)
Facility: MEDICAL CENTER | Age: 74
End: 2019-12-11
Attending: EMERGENCY MEDICINE | Admitting: HOSPITALIST
Payer: MEDICARE

## 2019-12-10 ENCOUNTER — APPOINTMENT (OUTPATIENT)
Dept: RADIOLOGY | Facility: MEDICAL CENTER | Age: 74
End: 2019-12-10
Attending: EMERGENCY MEDICINE
Payer: MEDICARE

## 2019-12-10 DIAGNOSIS — R42 DIZZINESS: ICD-10-CM

## 2019-12-10 PROBLEM — R00.2 PALPITATIONS: Status: ACTIVE | Noted: 2019-12-10

## 2019-12-10 LAB
ALBUMIN SERPL BCP-MCNC: 3.4 G/DL (ref 3.2–4.9)
ALBUMIN/GLOB SERPL: 1.1 G/DL
ALP SERPL-CCNC: 139 U/L (ref 30–99)
ALT SERPL-CCNC: 18 U/L (ref 2–50)
ANION GAP SERPL CALC-SCNC: 14 MMOL/L (ref 0–11.9)
AST SERPL-CCNC: 25 U/L (ref 12–45)
BASOPHILS # BLD AUTO: 0.6 % (ref 0–1.8)
BASOPHILS # BLD: 0.04 K/UL (ref 0–0.12)
BILIRUB SERPL-MCNC: 0.5 MG/DL (ref 0.1–1.5)
BUN SERPL-MCNC: 12 MG/DL (ref 8–22)
CALCIUM SERPL-MCNC: 8.9 MG/DL (ref 8.4–10.2)
CHLORIDE SERPL-SCNC: 98 MMOL/L (ref 96–112)
CO2 SERPL-SCNC: 28 MMOL/L (ref 20–33)
CREAT SERPL-MCNC: 1.77 MG/DL (ref 0.5–1.4)
EOSINOPHIL # BLD AUTO: 0 K/UL (ref 0–0.51)
EOSINOPHIL NFR BLD: 0 % (ref 0–6.9)
ERYTHROCYTE [DISTWIDTH] IN BLOOD BY AUTOMATED COUNT: 51.4 FL (ref 35.9–50)
GLOBULIN SER CALC-MCNC: 3.2 G/DL (ref 1.9–3.5)
GLUCOSE BLD-MCNC: 94 MG/DL (ref 65–99)
GLUCOSE SERPL-MCNC: 138 MG/DL (ref 65–99)
HCT VFR BLD AUTO: 27.1 % (ref 42–52)
HGB BLD-MCNC: 8.9 G/DL (ref 14–18)
IMM GRANULOCYTES # BLD AUTO: 0.03 K/UL (ref 0–0.11)
IMM GRANULOCYTES NFR BLD AUTO: 0.4 % (ref 0–0.9)
LYMPHOCYTES # BLD AUTO: 1.05 K/UL (ref 1–4.8)
LYMPHOCYTES NFR BLD: 14.8 % (ref 22–41)
MCH RBC QN AUTO: 30.6 PG (ref 27–33)
MCHC RBC AUTO-ENTMCNC: 32.8 G/DL (ref 33.7–35.3)
MCV RBC AUTO: 93.1 FL (ref 81.4–97.8)
MONOCYTES # BLD AUTO: 0.99 K/UL (ref 0–0.85)
MONOCYTES NFR BLD AUTO: 13.9 % (ref 0–13.4)
NEUTROPHILS # BLD AUTO: 4.99 K/UL (ref 1.82–7.42)
NEUTROPHILS NFR BLD: 70.3 % (ref 44–72)
NRBC # BLD AUTO: 0 K/UL
NRBC BLD-RTO: 0 /100 WBC
PLATELET # BLD AUTO: 171 K/UL (ref 164–446)
PMV BLD AUTO: 9.4 FL (ref 9–12.9)
POTASSIUM SERPL-SCNC: 3.9 MMOL/L (ref 3.6–5.5)
PROT SERPL-MCNC: 6.6 G/DL (ref 6–8.2)
RBC # BLD AUTO: 2.91 M/UL (ref 4.7–6.1)
SODIUM SERPL-SCNC: 140 MMOL/L (ref 135–145)
T4 FREE SERPL-MCNC: 2.59 NG/DL (ref 0.58–1.64)
TROPONIN T SERPL-MCNC: 92 NG/L (ref 6–19)
TSH SERPL DL<=0.005 MIU/L-ACNC: <0.005 UIU/ML (ref 0.38–5.33)
WBC # BLD AUTO: 7.1 K/UL (ref 4.8–10.8)

## 2019-12-10 PROCEDURE — 93005 ELECTROCARDIOGRAM TRACING: CPT | Performed by: EMERGENCY MEDICINE

## 2019-12-10 PROCEDURE — 82962 GLUCOSE BLOOD TEST: CPT

## 2019-12-10 PROCEDURE — 99285 EMERGENCY DEPT VISIT HI MDM: CPT

## 2019-12-10 PROCEDURE — G0378 HOSPITAL OBSERVATION PER HR: HCPCS

## 2019-12-10 PROCEDURE — A9270 NON-COVERED ITEM OR SERVICE: HCPCS | Performed by: HOSPITALIST

## 2019-12-10 PROCEDURE — 84443 ASSAY THYROID STIM HORMONE: CPT

## 2019-12-10 PROCEDURE — 85025 COMPLETE CBC W/AUTO DIFF WBC: CPT

## 2019-12-10 PROCEDURE — 99220 PR INITIAL OBSERVATION CARE,LEVL III: CPT | Performed by: HOSPITALIST

## 2019-12-10 PROCEDURE — 84484 ASSAY OF TROPONIN QUANT: CPT

## 2019-12-10 PROCEDURE — 71045 X-RAY EXAM CHEST 1 VIEW: CPT

## 2019-12-10 PROCEDURE — 700102 HCHG RX REV CODE 250 W/ 637 OVERRIDE(OP): Performed by: HOSPITALIST

## 2019-12-10 PROCEDURE — 84439 ASSAY OF FREE THYROXINE: CPT

## 2019-12-10 PROCEDURE — 80053 COMPREHEN METABOLIC PANEL: CPT

## 2019-12-10 RX ORDER — CLOPIDOGREL BISULFATE 75 MG/1
75 TABLET ORAL DAILY
Status: DISCONTINUED | OUTPATIENT
Start: 2019-12-10 | End: 2019-12-11 | Stop reason: HOSPADM

## 2019-12-10 RX ORDER — ONDANSETRON 2 MG/ML
4 INJECTION INTRAMUSCULAR; INTRAVENOUS EVERY 4 HOURS PRN
Status: DISCONTINUED | OUTPATIENT
Start: 2019-12-10 | End: 2019-12-11 | Stop reason: HOSPADM

## 2019-12-10 RX ORDER — OMEPRAZOLE 20 MG/1
20 CAPSULE, DELAYED RELEASE ORAL DAILY
Status: DISCONTINUED | OUTPATIENT
Start: 2019-12-10 | End: 2019-12-11 | Stop reason: HOSPADM

## 2019-12-10 RX ORDER — DIPHENHYDRAMINE HCL 25 MG
25 TABLET ORAL EVERY 6 HOURS PRN
COMMUNITY
End: 2020-04-21

## 2019-12-10 RX ORDER — ACETAMINOPHEN 325 MG/1
650 TABLET ORAL EVERY 6 HOURS PRN
Status: DISCONTINUED | OUTPATIENT
Start: 2019-12-10 | End: 2019-12-11 | Stop reason: HOSPADM

## 2019-12-10 RX ORDER — METHIMAZOLE 5 MG/1
5 TABLET ORAL DAILY
Status: ON HOLD | COMMUNITY
End: 2019-12-11 | Stop reason: SDUPTHER

## 2019-12-10 RX ORDER — POLYETHYLENE GLYCOL 3350 17 G/17G
1 POWDER, FOR SOLUTION ORAL
Status: DISCONTINUED | OUTPATIENT
Start: 2019-12-10 | End: 2019-12-11 | Stop reason: HOSPADM

## 2019-12-10 RX ORDER — AMOXICILLIN 250 MG
2 CAPSULE ORAL 2 TIMES DAILY
Status: DISCONTINUED | OUTPATIENT
Start: 2019-12-10 | End: 2019-12-11 | Stop reason: HOSPADM

## 2019-12-10 RX ORDER — DIPHENHYDRAMINE HCL 25 MG
25 TABLET ORAL EVERY 6 HOURS PRN
Status: DISCONTINUED | OUTPATIENT
Start: 2019-12-10 | End: 2019-12-11 | Stop reason: HOSPADM

## 2019-12-10 RX ORDER — ATORVASTATIN CALCIUM 40 MG/1
40 TABLET, FILM COATED ORAL
Status: DISCONTINUED | OUTPATIENT
Start: 2019-12-10 | End: 2019-12-11 | Stop reason: HOSPADM

## 2019-12-10 RX ORDER — METHIMAZOLE 5 MG/1
5 TABLET ORAL 2 TIMES DAILY
Status: DISCONTINUED | OUTPATIENT
Start: 2019-12-10 | End: 2019-12-11 | Stop reason: HOSPADM

## 2019-12-10 RX ORDER — ONDANSETRON 4 MG/1
4 TABLET, ORALLY DISINTEGRATING ORAL EVERY 4 HOURS PRN
Status: DISCONTINUED | OUTPATIENT
Start: 2019-12-10 | End: 2019-12-11 | Stop reason: HOSPADM

## 2019-12-10 RX ORDER — INSULIN GLARGINE 100 [IU]/ML
5 INJECTION, SOLUTION SUBCUTANEOUS EVERY EVENING
Status: DISCONTINUED | OUTPATIENT
Start: 2019-12-10 | End: 2019-12-11 | Stop reason: HOSPADM

## 2019-12-10 RX ORDER — BISACODYL 10 MG
10 SUPPOSITORY, RECTAL RECTAL
Status: DISCONTINUED | OUTPATIENT
Start: 2019-12-10 | End: 2019-12-11 | Stop reason: HOSPADM

## 2019-12-10 RX ADMIN — APIXABAN 2.5 MG: 5 TABLET, FILM COATED ORAL at 18:32

## 2019-12-10 RX ADMIN — ATORVASTATIN CALCIUM 40 MG: 40 TABLET, FILM COATED ORAL at 21:02

## 2019-12-10 RX ADMIN — OMEPRAZOLE 20 MG: 20 CAPSULE, DELAYED RELEASE ORAL at 18:32

## 2019-12-10 RX ADMIN — CALCIUM ACETATE 1334 MG: 667 CAPSULE ORAL at 18:32

## 2019-12-10 RX ADMIN — METHIMAZOLE 5 MG: 5 TABLET ORAL at 18:32

## 2019-12-10 ASSESSMENT — ENCOUNTER SYMPTOMS
WEIGHT LOSS: 1
LOSS OF CONSCIOUSNESS: 0
FEVER: 0
SHORTNESS OF BREATH: 0
CHILLS: 0
MUSCULOSKELETAL NEGATIVE: 1
RESPIRATORY NEGATIVE: 1
ABDOMINAL PAIN: 0
BRUISES/BLEEDS EASILY: 0
WEAKNESS: 0
PALPITATIONS: 1
DEPRESSION: 0
VOMITING: 0
NERVOUS/ANXIOUS: 0
GASTROINTESTINAL NEGATIVE: 1
PSYCHIATRIC NEGATIVE: 1
MYALGIAS: 0
NAUSEA: 0
BACK PAIN: 0
DIZZINESS: 1
COUGH: 0
FLANK PAIN: 0

## 2019-12-10 ASSESSMENT — COGNITIVE AND FUNCTIONAL STATUS - GENERAL
MOBILITY SCORE: 24
SUGGESTED CMS G CODE MODIFIER DAILY ACTIVITY: CH
SUGGESTED CMS G CODE MODIFIER MOBILITY: CH
DAILY ACTIVITIY SCORE: 24

## 2019-12-10 ASSESSMENT — LIFESTYLE VARIABLES
ON A TYPICAL DAY WHEN YOU DRINK ALCOHOL HOW MANY DRINKS DO YOU HAVE: 0
HAVE YOU EVER FELT YOU SHOULD CUT DOWN ON YOUR DRINKING: NO
CONSUMPTION TOTAL: NEGATIVE
EVER HAD A DRINK FIRST THING IN THE MORNING TO STEADY YOUR NERVES TO GET RID OF A HANGOVER: NO
HOW MANY TIMES IN THE PAST YEAR HAVE YOU HAD 5 OR MORE DRINKS IN A DAY: 0
ALCOHOL_USE: NO
TOTAL SCORE: 0
HAVE PEOPLE ANNOYED YOU BY CRITICIZING YOUR DRINKING: NO
EVER_SMOKED: YES
TOTAL SCORE: 0
EVER FELT BAD OR GUILTY ABOUT YOUR DRINKING: NO
AVERAGE NUMBER OF DAYS PER WEEK YOU HAVE A DRINK CONTAINING ALCOHOL: 0
TOTAL SCORE: 0

## 2019-12-10 ASSESSMENT — COPD QUESTIONNAIRES
HAVE YOU SMOKED AT LEAST 100 CIGARETTES IN YOUR ENTIRE LIFE: YES
COPD SCREENING SCORE: 4
DO YOU EVER COUGH UP ANY MUCUS OR PHLEGM?: NO/ONLY WITH OCCASIONAL COLDS OR INFECTIONS
IN THE PAST 12 MONTHS DO YOU DO LESS THAN YOU USED TO BECAUSE OF YOUR BREATHING PROBLEMS: DISAGREE/UNSURE
DURING THE PAST 4 WEEKS HOW MUCH DID YOU FEEL SHORT OF BREATH: NONE/LITTLE OF THE TIME

## 2019-12-10 NOTE — ED NOTES
Med rec updated and complete  Allergies reviewed  Interviewed pt with ex-wife at bedside with permission from pt.  Pt had a list of medications, went over list of medications and returned list of medications back to pt at bedside.  Pt reports no antibiotics in the last 2 weeks, that he had to take at home.

## 2019-12-10 NOTE — ED PROVIDER NOTES
ED Provider Note    CHIEF COMPLAINT  Chief Complaint   Patient presents with   • Dizziness       HPI  Luis Sepulveda is a 74 y.o. male here for evaluation of dizziness and lightheadedness.  The pt states that he was in dialysis today, of which he recently started three weeks ago, and states that he went home today, and had some increased dizziness.  He did not fall, and the room did not spin, but he is worse with walking around. He has no vomiting, no fever, no chills.  He has no recent uri or viral illness.     ROS  See HPI for further details, o/w negative.     PAST MEDICAL HISTORY   has a past medical history of CAD (coronary artery disease), Chronic kidney disease (CKD), stage V (HCC), Diabetes, Hypertension, Osteoarthritis, and Peripheral neuropathy.    SOCIAL HISTORY  Social History     Tobacco Use   • Smoking status: Former Smoker     Packs/day: 1.00     Years: 55.00     Pack years: 55.00     Last attempt to quit: 2014     Years since quittin.8   • Smokeless tobacco: Never Used   Substance and Sexual Activity   • Alcohol use: No   • Drug use: No   • Sexual activity: Not on file       Family History  No bleeding disorders     SURGICAL HISTORY   has a past surgical history that includes other cardiac surgery; appendectomy; and other orthopedic surgery.    CURRENT MEDICATIONS  Home Medications     Reviewed by Sandra Siddiqui (Pharmacy Tech) on 12/10/19 at 1402  Med List Status: Complete   Medication Last Dose Status   apixaban (ELIQUIS) 5mg Tab 2019 Active   atorvastatin (LIPITOR) 40 MG Tab 2019 Active   calcium acetate (PHOS-LO) 667 MG Cap 2019 Active   clopidogrel (PLAVIX) 75 MG Tab 2019 Active   diphenhydrAMINE (BENADRYL) 25 MG Tab > 4 days Active   insulin glargine (LANTUS SOLOSTAR) 100 UNIT/ML Solution Pen-injector injection 2019 Active   methimazole (TAPAZOLE) 5 MG Tab 2019 Active   pantoprazole (PROTONIX) 40 MG Tablet Delayed Response 2019 Active                 ALLERGIES  No Known Allergies    REVIEW OF SYSTEMS  See HPI for further details. Review of systems as above, otherwise all other systems are negative.     PHYSICAL EXAM  Constitutional: Well developed, well nourished. mild acute distress.  HEENT: Normocephalic, atraumatic. Posterior pharynx clear and moist.  Eyes:  EOMI. Normal sclera.  Neck: Supple, Full range of motion, nontender.  Chest/Pulmonary: clear to ausculation. Symmetrical expansion.   Cardio: Regular rate and rhythm with no murmur.   Abdomen: Soft, nontender. No peritoneal signs. No guarding. No palpable masses.  Musculoskeletal: No deformity, no edema, neurovascular intact.   Neuro: Clear speech, appropriate, cooperative, cranial nerves II-XII grossly intact.  Psych: Normal mood and affect    Results for orders placed or performed during the hospital encounter of 12/10/19   CBC WITH DIFFERENTIAL   Result Value Ref Range    WBC 7.1 4.8 - 10.8 K/uL    RBC 2.91 (L) 4.70 - 6.10 M/uL    Hemoglobin 8.9 (L) 14.0 - 18.0 g/dL    Hematocrit 27.1 (L) 42.0 - 52.0 %    MCV 93.1 81.4 - 97.8 fL    MCH 30.6 27.0 - 33.0 pg    MCHC 32.8 (L) 33.7 - 35.3 g/dL    RDW 51.4 (H) 35.9 - 50.0 fL    Platelet Count 171 164 - 446 K/uL    MPV 9.4 9.0 - 12.9 fL    Neutrophils-Polys 70.30 44.00 - 72.00 %    Lymphocytes 14.80 (L) 22.00 - 41.00 %    Monocytes 13.90 (H) 0.00 - 13.40 %    Eosinophils 0.00 0.00 - 6.90 %    Basophils 0.60 0.00 - 1.80 %    Immature Granulocytes 0.40 0.00 - 0.90 %    Nucleated RBC 0.00 /100 WBC    Neutrophils (Absolute) 4.99 1.82 - 7.42 K/uL    Lymphs (Absolute) 1.05 1.00 - 4.80 K/uL    Monos (Absolute) 0.99 (H) 0.00 - 0.85 K/uL    Eos (Absolute) 0.00 0.00 - 0.51 K/uL    Baso (Absolute) 0.04 0.00 - 0.12 K/uL    Immature Granulocytes (abs) 0.03 0.00 - 0.11 K/uL    NRBC (Absolute) 0.00 K/uL   COMP METABOLIC PANEL   Result Value Ref Range    Sodium 140 135 - 145 mmol/L    Potassium 3.9 3.6 - 5.5 mmol/L    Chloride 98 96 - 112 mmol/L    Co2 28 20 -  33 mmol/L    Anion Gap 14.0 (H) 0.0 - 11.9    Glucose 138 (H) 65 - 99 mg/dL    Bun 12 8 - 22 mg/dL    Creatinine 1.77 (H) 0.50 - 1.40 mg/dL    Calcium 8.9 8.4 - 10.2 mg/dL    AST(SGOT) 25 12 - 45 U/L    ALT(SGPT) 18 2 - 50 U/L    Alkaline Phosphatase 139 (H) 30 - 99 U/L    Total Bilirubin 0.5 0.1 - 1.5 mg/dL    Albumin 3.4 3.2 - 4.9 g/dL    Total Protein 6.6 6.0 - 8.2 g/dL    Globulin 3.2 1.9 - 3.5 g/dL    A-G Ratio 1.1 g/dL   TROPONIN   Result Value Ref Range    Troponin T 92 (H) 6 - 19 ng/L   ESTIMATED GFR   Result Value Ref Range    GFR If African American 45 (A) >60 mL/min/1.73 m 2    GFR If Non  38 (A) >60 mL/min/1.73 m 2   EKG   Result Value Ref Range    Report       [facility] Emergency Dept.    Test Date:  2019-12-10  Pt Name:    KLARISSA BRADSHAW              Department: [facility]  MRN:        [id number]                      Room:       -ROOM 7  Gender:     Male                         Technician: PING  :        1945                   Requested By:JOHN SAUNDERS  Order #:    [id number]                    Reading MD:    Measurements  Intervals                                Axis  Rate:       110                          P:          0  MA:         129                          QRS:        -65  QRSD:       138                          T:          81  QT:         364  QTc:        493    Interpretive Statements  SINUS TACHYCARDIA  RBBB AND LAFB  Compared to ECG 2019 07:32:49  Left anterior fascicular block now present  Myocardial infarct finding no longer present       DX-CHEST-PORTABLE (1 VIEW)   Final Result      Stable chest with left basilar pleural space calcification and scarring        Ekg;  Sinus tach at 110.  No st elevation, no st depression, no ectopy. qtc  493.  Compared to ekg from 19    Results for orders placed or performed during the hospital encounter of 12/10/19   CBC WITH DIFFERENTIAL   Result Value Ref Range    WBC 7.1 4.8 - 10.8 K/uL     RBC 2.91 (L) 4.70 - 6.10 M/uL    Hemoglobin 8.9 (L) 14.0 - 18.0 g/dL    Hematocrit 27.1 (L) 42.0 - 52.0 %    MCV 93.1 81.4 - 97.8 fL    MCH 30.6 27.0 - 33.0 pg    MCHC 32.8 (L) 33.7 - 35.3 g/dL    RDW 51.4 (H) 35.9 - 50.0 fL    Platelet Count 171 164 - 446 K/uL    MPV 9.4 9.0 - 12.9 fL    Neutrophils-Polys 70.30 44.00 - 72.00 %    Lymphocytes 14.80 (L) 22.00 - 41.00 %    Monocytes 13.90 (H) 0.00 - 13.40 %    Eosinophils 0.00 0.00 - 6.90 %    Basophils 0.60 0.00 - 1.80 %    Immature Granulocytes 0.40 0.00 - 0.90 %    Nucleated RBC 0.00 /100 WBC    Neutrophils (Absolute) 4.99 1.82 - 7.42 K/uL    Lymphs (Absolute) 1.05 1.00 - 4.80 K/uL    Monos (Absolute) 0.99 (H) 0.00 - 0.85 K/uL    Eos (Absolute) 0.00 0.00 - 0.51 K/uL    Baso (Absolute) 0.04 0.00 - 0.12 K/uL    Immature Granulocytes (abs) 0.03 0.00 - 0.11 K/uL    NRBC (Absolute) 0.00 K/uL   COMP METABOLIC PANEL   Result Value Ref Range    Sodium 140 135 - 145 mmol/L    Potassium 3.9 3.6 - 5.5 mmol/L    Chloride 98 96 - 112 mmol/L    Co2 28 20 - 33 mmol/L    Anion Gap 14.0 (H) 0.0 - 11.9    Glucose 138 (H) 65 - 99 mg/dL    Bun 12 8 - 22 mg/dL    Creatinine 1.77 (H) 0.50 - 1.40 mg/dL    Calcium 8.9 8.4 - 10.2 mg/dL    AST(SGOT) 25 12 - 45 U/L    ALT(SGPT) 18 2 - 50 U/L    Alkaline Phosphatase 139 (H) 30 - 99 U/L    Total Bilirubin 0.5 0.1 - 1.5 mg/dL    Albumin 3.4 3.2 - 4.9 g/dL    Total Protein 6.6 6.0 - 8.2 g/dL    Globulin 3.2 1.9 - 3.5 g/dL    A-G Ratio 1.1 g/dL   TROPONIN   Result Value Ref Range    Troponin T 92 (H) 6 - 19 ng/L   ESTIMATED GFR   Result Value Ref Range    GFR If African American 45 (A) >60 mL/min/1.73 m 2    GFR If Non  38 (A) >60 mL/min/1.73 m 2   EKG   Result Value Ref Range    Report       [facility] Emergency Dept.    Test Date:  2019-12-10  Pt Name:    KLARISSA BRADSHAW              Department: [facility]  MRN:        [id number]                      Room:       -ROOM 7  Gender:     Male                          Technician: PING  :        1945                   Requested By:ROCCO SAUNDERS  Order #:    [id number]                    Reading MD:    Measurements  Intervals                                Axis  Rate:       110                          P:          0  ME:         129                          QRS:        -65  QRSD:       138                          T:          81  QT:         364  QTc:        493    Interpretive Statements  SINUS TACHYCARDIA  RBBB AND LAFB  Compared to ECG 2019 07:32:49  Left anterior fascicular block now present  Myocardial infarct finding no longer present       DX-CHEST-PORTABLE (1 VIEW)   Final Result      Stable chest with left basilar pleural space calcification and scarring          PROCEDURES     MEDICAL RECORD  I have reviewed patient's medical record and pertinent results are listed.    COURSE & MEDICAL DECISION MAKING  I have reviewed any medical record information, laboratory studies and radiographic results as noted above.    3:12 PM  Dr. North will keep the pt for further evaluation, hydration, and repeat of the cardiac enzymes.       FINAL IMPRESSION  Dizziness   Dialysis       Electronically signed by: Rocco Saunders, 12/10/2019 3:11 PM

## 2019-12-10 NOTE — ED NOTES
Pt also reports unable to take his clopidogrel and eliquis this am d/t prescription refill not coming in mail.

## 2019-12-10 NOTE — ED NOTES
Patient resting comfortably, denies needs at this time. Call light in reach. Bed in low position. A&O x 4. Family at bedside.

## 2019-12-10 NOTE — ED TRIAGE NOTES
Pt had dialysis around 10 am. Went home and became light-headed/dizzy and weak around 11 am. Medics report Afib with [facility] upon their arrival in the 150s. Gave him a little fluid en route.

## 2019-12-11 VITALS
HEART RATE: 87 BPM | BODY MASS INDEX: 26.48 KG/M2 | WEIGHT: 158.95 LBS | DIASTOLIC BLOOD PRESSURE: 58 MMHG | SYSTOLIC BLOOD PRESSURE: 136 MMHG | OXYGEN SATURATION: 98 % | RESPIRATION RATE: 20 BRPM | TEMPERATURE: 99.1 F | HEIGHT: 65 IN

## 2019-12-11 PROBLEM — R00.2 PALPITATIONS: Status: RESOLVED | Noted: 2019-12-10 | Resolved: 2019-12-11

## 2019-12-11 LAB
ALBUMIN SERPL BCP-MCNC: 3.2 G/DL (ref 3.2–4.9)
ALBUMIN/GLOB SERPL: 1.1 G/DL
ALP SERPL-CCNC: 133 U/L (ref 30–99)
ALT SERPL-CCNC: 15 U/L (ref 2–50)
ANION GAP SERPL CALC-SCNC: 13 MMOL/L (ref 0–11.9)
AST SERPL-CCNC: 19 U/L (ref 12–45)
BASOPHILS # BLD AUTO: 0.4 % (ref 0–1.8)
BASOPHILS # BLD: 0.02 K/UL (ref 0–0.12)
BILIRUB SERPL-MCNC: 0.5 MG/DL (ref 0.1–1.5)
BUN SERPL-MCNC: 19 MG/DL (ref 8–22)
CALCIUM SERPL-MCNC: 8.7 MG/DL (ref 8.4–10.2)
CHLORIDE SERPL-SCNC: 100 MMOL/L (ref 96–112)
CO2 SERPL-SCNC: 25 MMOL/L (ref 20–33)
CREAT SERPL-MCNC: 2.84 MG/DL (ref 0.5–1.4)
EOSINOPHIL # BLD AUTO: 0.01 K/UL (ref 0–0.51)
EOSINOPHIL NFR BLD: 0.2 % (ref 0–6.9)
ERYTHROCYTE [DISTWIDTH] IN BLOOD BY AUTOMATED COUNT: 51.8 FL (ref 35.9–50)
GLOBULIN SER CALC-MCNC: 2.8 G/DL (ref 1.9–3.5)
GLUCOSE SERPL-MCNC: 123 MG/DL (ref 65–99)
HCT VFR BLD AUTO: 24.7 % (ref 42–52)
HGB BLD-MCNC: 8 G/DL (ref 14–18)
IMM GRANULOCYTES # BLD AUTO: 0.01 K/UL (ref 0–0.11)
IMM GRANULOCYTES NFR BLD AUTO: 0.2 % (ref 0–0.9)
LYMPHOCYTES # BLD AUTO: 0.99 K/UL (ref 1–4.8)
LYMPHOCYTES NFR BLD: 21 % (ref 22–41)
MCH RBC QN AUTO: 30.7 PG (ref 27–33)
MCHC RBC AUTO-ENTMCNC: 32.4 G/DL (ref 33.7–35.3)
MCV RBC AUTO: 94.6 FL (ref 81.4–97.8)
MONOCYTES # BLD AUTO: 0.67 K/UL (ref 0–0.85)
MONOCYTES NFR BLD AUTO: 14.2 % (ref 0–13.4)
NEUTROPHILS # BLD AUTO: 3.01 K/UL (ref 1.82–7.42)
NEUTROPHILS NFR BLD: 64 % (ref 44–72)
NRBC # BLD AUTO: 0 K/UL
NRBC BLD-RTO: 0 /100 WBC
PLATELET # BLD AUTO: 173 K/UL (ref 164–446)
PMV BLD AUTO: 9.9 FL (ref 9–12.9)
POTASSIUM SERPL-SCNC: 3.5 MMOL/L (ref 3.6–5.5)
PROT SERPL-MCNC: 6 G/DL (ref 6–8.2)
RBC # BLD AUTO: 2.61 M/UL (ref 4.7–6.1)
SODIUM SERPL-SCNC: 138 MMOL/L (ref 135–145)
WBC # BLD AUTO: 4.7 K/UL (ref 4.8–10.8)

## 2019-12-11 PROCEDURE — 99217 PR OBSERVATION CARE DISCHARGE: CPT | Performed by: INTERNAL MEDICINE

## 2019-12-11 PROCEDURE — 80053 COMPREHEN METABOLIC PANEL: CPT

## 2019-12-11 PROCEDURE — G0378 HOSPITAL OBSERVATION PER HR: HCPCS

## 2019-12-11 PROCEDURE — 85025 COMPLETE CBC W/AUTO DIFF WBC: CPT

## 2019-12-11 PROCEDURE — A9270 NON-COVERED ITEM OR SERVICE: HCPCS | Performed by: HOSPITALIST

## 2019-12-11 PROCEDURE — 700102 HCHG RX REV CODE 250 W/ 637 OVERRIDE(OP): Performed by: HOSPITALIST

## 2019-12-11 RX ORDER — METHIMAZOLE 5 MG/1
5 TABLET ORAL 2 TIMES DAILY
Qty: 60 TAB | Refills: 3 | Status: SHIPPED | OUTPATIENT
Start: 2019-12-11 | End: 2020-04-02 | Stop reason: SDUPTHER

## 2019-12-11 RX ORDER — CLOPIDOGREL BISULFATE 75 MG/1
75 TABLET ORAL DAILY
Qty: 30 TAB | Refills: 6 | Status: SHIPPED | OUTPATIENT
Start: 2019-12-11 | End: 2020-04-02 | Stop reason: SDUPTHER

## 2019-12-11 RX ADMIN — METHIMAZOLE 5 MG: 5 TABLET ORAL at 06:20

## 2019-12-11 RX ADMIN — APIXABAN 2.5 MG: 5 TABLET, FILM COATED ORAL at 06:20

## 2019-12-11 RX ADMIN — OMEPRAZOLE 20 MG: 20 CAPSULE, DELAYED RELEASE ORAL at 06:21

## 2019-12-11 RX ADMIN — CALCIUM ACETATE 1334 MG: 667 CAPSULE ORAL at 07:44

## 2019-12-11 RX ADMIN — CALCIUM ACETATE 1334 MG: 667 CAPSULE ORAL at 11:12

## 2019-12-11 NOTE — PROGRESS NOTES
Hospitalist JAN Ramirez, spoke with [facility] Pharmacist for clarification on prescriptions. Per [facility] pharmacist, pt should be able to fill prescriptions without issues. Discussed discharge instructions with supportive spouse at bedside, all questions answered. Tele and IV D/C. Belongings gathered and given to pt upon leaving the unit.

## 2019-12-11 NOTE — PROGRESS NOTES
Bedside report given to JAN Lechuga. POC discussed, all questions answered. Safety precautions in place. Care released.

## 2019-12-11 NOTE — ASSESSMENT & PLAN NOTE
Not well controlled at this time, recommend increasing methimazole to 5 mg twice daily, his TSH is still completely suppressed.  Patient states he has already had a biopsy and ultrasound of his thyroid gland he is now needs referral to endocrinology and consider radioiodine ablation of the gland.  Monitor on telemetry, high risk for atrial fibrillation.

## 2019-12-11 NOTE — ASSESSMENT & PLAN NOTE
With tachycardia noted in ambulance, there was thought that maybe it was atrial fibrillation, he is in sinus rhythm in the emergency department, monitor on telemetry, treat hyperthyroidism.

## 2019-12-11 NOTE — DISCHARGE SUMMARY
Discharge Summary    CHIEF COMPLAINT ON ADMISSION  Chief Complaint   Patient presents with   • Dizziness       Reason for Admission  Tachycardia     Admission Date  12/10/2019    CODE STATUS  Full Code    HPI & HOSPITAL COURSE  This is a 74 y.o. male here with fatigue, dizziness and chest palpitations after dialysis. He has known hyperthyroidism and is on methimazole for this. He is also supposed to be on plavix and eliquis for a recent myocardial infarction and history of stent placement but his pharmacy had not approved both medications so he was only taking them intermittently.  The patient was found to have tachycardia, his TSH level was appropriately suppressed but T4 level was elevated at 2.59. His methimazole dose was increased to 5mg bid and his heart rate came down to the 80-90 range. The patients other symptoms resolved. His prescriptions were sent to a local pharmacy that would approve them all to be filled.       Therefore, he is discharged in good and stable condition to home with close outpatient follow-up.      Discharge Date  12/11/2019    FOLLOW UP ITEMS POST DISCHARGE  Primary care provider follow up for endocrinology referral for thyroid ablation    DISCHARGE DIAGNOSES  Principal Problem (Resolved):    Palpitations POA: Yes  Active Problems:    CAD (coronary artery disease) POA: Yes      Overview: Stents to proximal and mid LAD in [facility].    Anemia of chronic disease POA: Yes    Diabetes mellitus type 2, controlled, with complications (HCC) POA: Yes    End stage renal disease on dialysis (HCC) POA: Yes    Elevated troponin POA: Yes    Hyperthyroidism POA: Yes      FOLLOW UP  Future Appointments   Date Time Provider Department Center   6/2/2020  2:20 PM Kulwant Osborn M.D. [facility] None     Patito Edgar M.D.  [address]  [address]  [facility] [address]  [phone]      You will need a referral to endocrinology for follow-up.      MEDICATIONS ON DISCHARGE     Medication  List      CHANGE how you take these medications      Instructions   methimazole 5 MG Tabs  What changed:  when to take this  Commonly known as:  TAPAZOLE   Take 1 Tab by mouth 2 Times a Day.  Dose:  5 mg        CONTINUE taking these medications      Instructions   apixaban 5mg Tabs  Commonly known as:  ELIQUIS   Take 0.5 Tabs by mouth 2 Times a Day.  Dose:  2.5 mg     atorvastatin 40 MG Tabs  Commonly known as:  LIPITOR   Take 1 Tab by mouth every bedtime.  Dose:  40 mg     calcium acetate 667 MG Caps  Commonly known as:  PHOS-LO   Take 1,334 mg by mouth 3 times a day, with meals.  Dose:  1,334 mg     clopidogrel 75 MG Tabs  Commonly known as:  PLAVIX   Take 1 Tab by mouth every day.  Dose:  75 mg     diphenhydrAMINE 25 MG Tabs  Commonly known as:  BENADRYL   Take 25 mg by mouth every 6 hours as needed for Itching.  Dose:  25 mg     insulin glargine 100 UNIT/ML Sopn injection  Commonly known as:  LANTUS SOLOSTAR   Inject 5 Units as instructed every evening for 60 days.  Dose:  5 Units     PROTONIX 40 MG Tbec  Generic drug:  pantoprazole   Take 40 mg by mouth every day.  Dose:  40 mg            Allergies  No Known Allergies    DIET  Orders Placed This Encounter   Procedures   • Diet Order Regular     Standing Status:   Standing     Number of Occurrences:   1     Order Specific Question:   Diet:     Answer:   Regular [1]       ACTIVITY  As tolerated.  Weight bearing as tolerated    CONSULTATIONS  none    PROCEDURES  none    LABORATORY  Lab Results   Component Value Date    SODIUM 138 12/11/2019    POTASSIUM 3.5 (L) 12/11/2019    CHLORIDE 100 12/11/2019    CO2 25 12/11/2019    GLUCOSE 123 (H) 12/11/2019    BUN 19 12/11/2019    CREATININE 2.84 (H) 12/11/2019        Lab Results   Component Value Date    WBC 4.7 (L) 12/11/2019    HEMOGLOBIN 8.0 (L) 12/11/2019    HEMATOCRIT 24.7 (L) 12/11/2019    PLATELETCT 173 12/11/2019        Total time of the discharge process exceeds 34 minutes.

## 2019-12-11 NOTE — DISCHARGE INSTRUCTIONS
"  Discharge Instructions per Jody Valdivia M.D.    Follow up with primary care provider as soon as possible and for endocrinology referral    DIET: regular    ACTIVITY: as tolerated    DIAGNOSIS: hyperthyroidism    Return to ER if shortness of breath or chest palpitations occur      Dialysis  Care After  Dialysis is a treatment that removes the toxic wastes from the body when the kidneys fail to do this on their own. There are 2 types of dialysis:  · Hemodialysis. Blood is pumped from the body through a filter (dialyzer). The blood is cleaned of waste products and returned to the body. Hemodialysis is performed in a dialysis center for 3 to 4 hours, 3 times a week. Dialysis is performed through an arteriovenous (AV) access, which provides access to the blood vessel. The main advantage of hemodialysis is that no training is required of the patient. Disadvantages include potential AV access failure and lack of freedom, as you must stay relatively near a dialysis center.   · Peritoneal dialysis. The body's own lining (membrane) is used as a filter. A fluid drains in and out of the abdomen to get rid of the body's toxic wastes. Advantages are that it can be taught to the patient and can be done at home with careful technique. It allows more freedom and less discomfort. Disadvantages include potential inflammation inside the abdomen (peritonitis) and membrane failure.   HOME CARE INSTRUCTIONS  · If you have an AV access:   · Check for a \"thrill\" at your access site every day. This is a vibrating or buzzing feeling when you place your fingers over the access site. This means the access is working. If you do not feel a \"thrill,\" the access needs to be repaired.   · Never wear tight clothing or jewelry around the access.   · Avoid sleeping on the access. This can decrease circulation and can cause the access to clot.   · Keep the bandage (dressing) on for a couple hours after your treatment or as told by your caregiver. " "Avoid getting your dressing wet. If the dressing comes off, cover the access site with a 4x4 gauze dressing and tape securely.   · Keep your access site clean to prevent infection.   · Keep some 4x4 gauze dressings at home in case your access starts to bleed. You may have received a medicine that can cause bleeding called heparin with your treatment.   · Control your blood pressure. High blood pressure (hypertension) damages your heart and vessels. It is important to exercise as much as possible.   · Keep your cholesterol under control. Exercise regularly or as directed.   SEEK MEDICAL CARE IF:  · You do not feel a \"thrill\" in your AV access.   · You have a fever, chills, sweats, or weakness.   · You have a small amount of bleeding at your access site.   · Your blood pressure is too high.   SEEK IMMEDIATE MEDICAL CARE IF:  · You have sudden chest pain or trouble breathing.   · You have bleeding that cannot be stopped or controlled with direct pressure.   MAKE SURE YOU:  · Understand these instructions.   · Will watch your condition.   · Will get help right away if you are not doing well or get worse.   Document Released: 07/10/2006 Document Revised: 03/11/2013 Document Reviewed: 01/24/2012  [employer]® Patient Information ©2013 [employer].        Discharge Instructions    Discharged to home by car with relative. Discharged via wheelchair, hospital escort: Yes.  Special equipment needed: Not Applicable    Be sure to schedule a follow-up appointment with your primary care doctor or any specialists as instructed.     Discharge Plan:   Diet Plan: Discussed  Activity Level: Discussed  Confirmed Follow up Appointment: Patient to Call and Schedule Appointment  Confirmed Symptoms Management: Discussed  Medication Reconciliation Updated: Yes  Influenza Vaccine Indication: Not indicated: Previously immunized this influenza season and > 8 years of age    I understand that a diet low in cholesterol, fat, and sodium is " recommended for good health. Unless I have been given specific instructions below for another diet, I accept this instruction as my diet prescription.   Other diet: regular    Special Instructions: None    · Is patient discharged on Warfarin / Coumadin?   No     Depression / Suicide Risk    As you are discharged from this [facility] facility, it is important to learn how to keep safe from harming yourself.    Recognize the warning signs:  · Abrupt changes in personality, positive or negative- including increase in energy   · Giving away possessions  · Change in eating patterns- significant weight changes-  positive or negative  · Change in sleeping patterns- unable to sleep or sleeping all the time   · Unwillingness or inability to communicate  · Depression  · Unusual sadness, discouragement and loneliness  · Talk of wanting to die  · Neglect of personal appearance   · Rebelliousness- reckless behavior  · Withdrawal from people/activities they love  · Confusion- inability to concentrate     If you or a loved one observes any of these behaviors or has concerns about self-harm, here's what you can do:  · Talk about it- your feelings and reasons for harming yourself  · Remove any means that you might use to hurt yourself (examples: pills, rope, extension cords, firearm)  · Get professional help from the community (Mental Health, Substance Abuse, psychological counseling)  · Do not be alone:Call your Safe Contact- someone whom you trust who will be there for you.  · Call your local CRISIS HOTLINE 929-5360 or 247-047-3647  · Call your local Children's Mobile Crisis Response Team [place] (526) 776-7099 or www.[employer]  · Call the toll free National Suicide Prevention Hotlines   · National Suicide Prevention Lifeline 624-252-GEBW (8895)  · National Hope Line Network 800-SUICIDE (550-6761)

## 2019-12-11 NOTE — ASSESSMENT & PLAN NOTE
Dialysis on Tuesday, Thursday and Saturday.  Continue phosphorus sequestrants  Consult nephrology if patient does not go home tomorrow.

## 2019-12-11 NOTE — DOCUMENTATION QUERY
[facility]                                                                       Query Response Note      PATIENT:               KLARISSA BRADSHAW  ACCT #:                  [id number]  MRN:                     [id number]  :                      1945  ADMIT DATE:       2019 7:25 AM  DISCH DATE:        2019 2:05 PM  RESPONDING  PROVIDER #:        [id number]           QUERY TEXT:    Patient was admitted for hypotension.  Has recent diagnosis of ESRD and started on hemodialysis. Nephrology notes hypotension was present after hemodialysis treatment. Cardiology suggests this is orthostatic hypotension.    The  is unable to determine the source of hypotension.  Please clarify they source of hypotension.    NOTE:  If an appropriate response is not listed below, please respond with a new note.      The patient's Clinical Indicators include:  per 19 nephrology consult:  HISTORY OF PRESENT ILLNESS:    73-year-old male with history of new ESRD and recent end STEMI presented with chest pressure.     Recently initiated hemodialysis via right chest permacath.  Went to dialysis on Tuesday and had some symptomatic hypotension.    per 19-nephrology PN:  ASSESSMENT:  # New ESRD: TTS HD outpatient  # Chest pressure: recent NSTEMI with cath .   # CAD  # hypotension: doubt intravascular depletion based on exam and imaging. Resolved.  Options provided:   -- hypotension is related to hemodialysis treatment   -- hypotension is related to vascular source   -- orthostatic hypotension   -- hypotension-source unknown      Query created by: Soco De La Cruz on 2019 3:51 PM    RESPONSE TEXT:    hypotension is related to hemodialysis treatment          Electronically signed by:  MILENA HOLLIS 2019 2:05 PM

## 2019-12-11 NOTE — DISCHARGE PLANNING
Outpatient Dialysis Note    Confirmed patient is active at:    [facility]  [address]   [place] [address]     Schedule: Tuesday, Thursday, Saturday  Time: 06:15am     Spoke with Clayton at facility who confirmed.    Forwarded records for review.    Becca Bojorquez- Dialysis Coordinator  [employer] [facility] # [phone]

## 2019-12-11 NOTE — PROGRESS NOTES
Telemetry Shift Summary    Rhythm SR  Ectopy NA  Measurements 0.16/0.14/0.36        Normal Values  Rhythm SR  HR Range    Measurements 0.12-0.20 / 0.06-0.10  / 0.30-0.52

## 2019-12-11 NOTE — H&P
Hospital Medicine History & Physical Note    Date of Service  12/10/2019    Primary Care Physician  Patito Edgar M.D.    Consultants  None    Code Status  Full code    Chief Complaint  Palpitations, dizziness    History of Presenting Illness  74 y.o. male who presented 12/10/2019 with feeling generally ill, weak, tired with palpitations and dizziness following dialysis today.  He was brought in by ambulance and on the right and he did have some tachycardia that was thought possibly irregular and may be atrial fibrillation, on the monitor in the emergency department and on EKG he is in sinus rhythm.  Patient has hyperthyroidism diagnosed about 9 months ago, he has not yet seen an endocrinologist and has not had radioiodine ablation.  He states he has been generally compliant with taking methimazole.  He has a history of coronary stent placements, last month he had a non-ST elevation myocardial infarction that was evaluated but elected to be treated with medical therapy.  His ex-wife is at the bedside, she states that he has had memory difficulties, weight loss, anxiety and generalized weakness.  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      I did discuss the findings plan of care and assessment with the emergency department physician.    Review of Systems  Review of Systems   Constitutional: Positive for malaise/fatigue and weight loss. Negative for chills and fever.   HENT: Negative.    Respiratory: Negative.  Negative for cough and shortness of breath.    Cardiovascular: Positive for palpitations. Negative for chest pain and leg swelling.   Gastrointestinal: Negative.  Negative for abdominal pain, nausea and vomiting.   Genitourinary: Negative.  Negative for dysuria and flank pain.   Musculoskeletal: Negative.  Negative for back pain and myalgias.   Neurological: Positive for dizziness. Negative for loss of consciousness and weakness.    Endo/Heme/Allergies: Negative.  Does not bruise/bleed easily.   Psychiatric/Behavioral: Negative.  Negative for depression. The patient is not nervous/anxious.    All other systems reviewed and are negative.      Past Medical History   has a past medical history of CAD (coronary artery disease), Chronic kidney disease (CKD), stage V (HCC), Diabetes, Hypertension, Osteoarthritis, and Peripheral neuropathy.    Surgical History   has a past surgical history that includes appendectomy; other orthopedic surgery; and other cardiac surgery.     Family History  family history includes Alcohol/Drug in his father; Diabetes in his brother; Heart Disease in his mother; Thyroid in his son.     Social History   reports that he quit smoking about 5 years ago. His smoking use included cigarettes. He has a 55.00 pack-year smoking history. He has never used smokeless tobacco. He reports that he does not drink alcohol or use drugs.    Allergies  No Known Allergies    Medications  Prior to Admission Medications   Prescriptions Last Dose Informant Patient Reported? Taking?   apixaban (ELIQUIS) 5mg Tab 12/9/2019 at 2200 Patient No No   Sig: Take 0.5 Tabs by mouth 2 Times a Day.   atorvastatin (LIPITOR) 40 MG Tab 12/9/2019 at 2330 Patient No No   Sig: Take 1 Tab by mouth every bedtime.   calcium acetate (PHOS-LO) 667 MG Cap 12/9/2019 at 1930 Patient Yes No   Sig: Take 1,334 mg by mouth 3 times a day, with meals.   clopidogrel (PLAVIX) 75 MG Tab 12/9/2019 at 0900 Patient Yes No   Sig: Take 75 mg by mouth every day.   diphenhydrAMINE (BENADRYL) 25 MG Tab > 4 days at Unknown Patient Yes Yes   Sig: Take 25 mg by mouth every 6 hours as needed for Itching.   insulin glargine (LANTUS SOLOSTAR) 100 UNIT/ML Solution Pen-injector injection 12/9/2019 at 2100 Patient No No   Sig: Inject 5 Units as instructed every evening for 60 days.   methimazole (TAPAZOLE) 5 MG Tab 12/9/2019 at 0900 Patient Yes Yes   Sig: Take 5 mg by mouth every day.    pantoprazole (PROTONIX) 40 MG Tablet Delayed Response 12/9/2019 at 0900 Patient Yes No   Sig: Take 40 mg by mouth every day.      Facility-Administered Medications: None       Physical Exam  Temp:  [36.8 °C (98.3 °F)-37.7 °C (99.8 °F)] 36.8 °C (98.3 °F)  Pulse:  [] 106  Resp:  [16-20] 20  BP: (100-147)/(52-71) 137/59  SpO2:  [94 %-100 %] 97 %    Physical Exam  Vitals signs and nursing note reviewed.   Constitutional:       General: He is not in acute distress.     Appearance: He is well-developed. He is not diaphoretic.   HENT:      Right Ear: External ear normal.      Left Ear: External ear normal.      Nose: Nose normal.      Mouth/Throat:      Pharynx: No oropharyngeal exudate.   Eyes:      General: No scleral icterus.        Right eye: No discharge.         Left eye: No discharge.      Conjunctiva/sclera: Conjunctivae normal.   Neck:      Thyroid: Thyromegaly (Slight prominence of left lobe of thyroid) present. No thyroid tenderness.      Vascular: No JVD.      Trachea: No tracheal deviation.   Cardiovascular:      Rate and Rhythm: Regular rhythm. Tachycardia present.      Heart sounds: Normal heart sounds.   Pulmonary:      Effort: Pulmonary effort is normal. No respiratory distress.      Breath sounds: Normal breath sounds. No stridor. No wheezing or rales.   Chest:      Chest wall: No tenderness.   Abdominal:      General: Bowel sounds are normal. There is no distension.      Palpations: Abdomen is soft.      Tenderness: There is no tenderness.   Musculoskeletal:         General: No tenderness.   Skin:     General: Skin is warm and dry.      Coloration: Skin is not pale.   Neurological:      General: No focal deficit present.      Mental Status: He is alert and oriented to person, place, and time.      Cranial Nerves: No cranial nerve deficit.      Motor: Tremor present. No abnormal muscle tone.      Comments: Small fine tremor of hands   Psychiatric:         Mood and Affect: Mood normal.          Behavior: Behavior normal.         Laboratory:  Recent Labs     12/10/19  1245   WBC 7.1   RBC 2.91*   HEMOGLOBIN 8.9*   HEMATOCRIT 27.1*   MCV 93.1   MCH 30.6   MCHC 32.8*   RDW 51.4*   PLATELETCT 171   MPV 9.4     Recent Labs     12/10/19  1245   SODIUM 140   POTASSIUM 3.9   CHLORIDE 98   CO2 28   GLUCOSE 138*   BUN 12   CREATININE 1.77*   CALCIUM 8.9     Recent Labs     12/10/19  1245   ALTSGPT 18   ASTSGOT 25   ALKPHOSPHAT 139*   TBILIRUBIN 0.5   GLUCOSE 138*         No results for input(s): NTPROBNP in the last 72 hours.      Recent Labs     12/10/19  1245   TROPONINT 92*       Urinalysis:    No results found     Imaging:  DX-CHEST-PORTABLE (1 VIEW)   Final Result      Stable chest with left basilar pleural space calcification and scarring            Assessment/Plan:  I anticipate this patient will require at least two midnights for appropriate medical management, necessitating inpatient admission.    * Palpitations- (present on admission)  Assessment & Plan  With tachycardia noted in ambulance, there was thought that maybe it was atrial fibrillation, he is in sinus rhythm in the emergency department, monitor on telemetry, treat hyperthyroidism.    Elevated troponin- (present on admission)  Assessment & Plan  Actually coming down after his non-STEMI last month, the persistent elevation is secondary to his renal disease.  No current complaints of chest pain, he did have some tachycardia, monitor on telemetry    End stage renal disease on dialysis (HCC)- (present on admission)  Assessment & Plan  Dialysis on Tuesday, Thursday and Saturday.  Continue phosphorus sequestrants  Consult nephrology if patient does not go home tomorrow.    Diabetes mellitus type 2, controlled, with complications (HCC)- (present on admission)  Assessment & Plan  Continue Lantus 5 units at bedtime diabetic diet    Anemia of chronic disease- (present on admission)  Assessment & Plan  Secondary to renal disease, probably worsened by his  uncontrolled hyperthyroidism.  Erythropoietin per nephrology.    CAD (coronary artery disease)- (present on admission)  Assessment & Plan  History of stent placements, he also had a non-STEMI last month, this is being managed medically.  Continue atorvastatin, apixaban and Plavix.  No chest pain at this time.    Hyperthyroidism- (present on admission)  Assessment & Plan  Not well controlled at this time, recommend increasing methimazole to 5 mg twice daily, his TSH is still completely suppressed.  Patient states he has already had a biopsy and ultrasound of his thyroid gland he is now needs referral to endocrinology and consider radioiodine ablation of the gland.  Monitor on telemetry, high risk for atrial fibrillation.        VTE prophylaxis: Eliquis

## 2019-12-11 NOTE — ASSESSMENT & PLAN NOTE
Actually coming down after his non-STEMI last month, the persistent elevation is secondary to his renal disease.  No current complaints of chest pain, he did have some tachycardia, monitor on telemetry

## 2019-12-11 NOTE — ASSESSMENT & PLAN NOTE
History of stent placements, he also had a non-STEMI last month, this is being managed medically.  Continue atorvastatin, apixaban and Plavix.  No chest pain at this time.

## 2019-12-11 NOTE — DISCHARGE PLANNING
ANDIEW spoke with pt at bedside. Pt uses [facility] for mail order Rx. Pt stated they stopped mailing him plavix and eliquis because they are both blood thinners. Pt stated that he would like his eliquis sent to [facility] since its pre-paid and plavix to [facility]. Pt will still need about a weeks worth of eliquis sent to [facility] as he wont get his 90 day mail order supply for a few days. YENI updated RN and hospitalist RN.

## 2019-12-11 NOTE — PROGRESS NOTES
Telemetry Shift Summary    Rhythm SR  HR Range 60s-100  Ectopy F-PVC, R-coup  Measurements 0.20/0.16/0.42        Normal Values  Rhythm SR  HR Range    Measurements 0.12-0.20 / 0.06-0.10  / 0.30-0.52

## 2019-12-11 NOTE — PROGRESS NOTES
Assumed care of pt w/ bedside report from JAN Lechuga. Pt resting comfortably in bed, no complaints offered. Wants to go home. Fall precautions/safety maintained. Hourly rounding. Will continue to monitor.

## 2019-12-11 NOTE — RESPIRATORY CARE
COPD EDUCATION by COPD CLINICAL EDUCATOR  12/11/2019 at 7:52 AM by Jacquelnie Chakraborty     Patient reviewed by COPD education team. Patient does not have a history or diagnosis of COPD and is a former smoker, therefore does not qualify for the COPD program.

## 2019-12-11 NOTE — ASSESSMENT & PLAN NOTE
Secondary to renal disease, probably worsened by his uncontrolled hyperthyroidism.  Erythropoietin per nephrology.

## 2019-12-12 LAB — EKG IMPRESSION: NORMAL

## 2019-12-18 ENCOUNTER — TELEPHONE (OUTPATIENT)
Dept: VASCULAR LAB | Facility: MEDICAL CENTER | Age: 74
End: 2019-12-18

## 2019-12-18 NOTE — TELEPHONE ENCOUNTER
Recieved anticoagulation referral for Eliquis due to Afib from Dr. Osborn on 12/06/19.    Please schedule within the next 4 weeks.     Insurance: Medicare  PCP: [facility]  Locations to be seen: [place]?    [facility] at [phone], fax [phone]    Gilda Randolph, MehnazD

## 2019-12-26 ENCOUNTER — TELEPHONE (OUTPATIENT)
Dept: VASCULAR LAB | Facility: MEDICAL CENTER | Age: 74
End: 2019-12-26

## 2019-12-26 NOTE — TELEPHONE ENCOUNTER
Recieved anticoagulation referral for Eliquis due to Afib from Dr. Osborn on 12/06/19.     Left VM for pt to please call and schedule with our clinic    Insurance: Medicare  PCP: [facility]  Locations to be seen: [place]?     [facility] at [phone], fax [phone]

## 2020-01-01 ENCOUNTER — APPOINTMENT (OUTPATIENT)
Dept: RADIOLOGY | Facility: MEDICAL CENTER | Age: 75
DRG: 853 | End: 2020-01-01
Attending: EMERGENCY MEDICINE
Payer: MEDICARE

## 2020-01-01 ENCOUNTER — APPOINTMENT (OUTPATIENT)
Dept: RADIOLOGY | Facility: MEDICAL CENTER | Age: 75
End: 2020-01-01
Attending: EMERGENCY MEDICINE
Payer: MEDICARE

## 2020-01-01 ENCOUNTER — ANESTHESIA (OUTPATIENT)
Dept: SURGERY | Facility: MEDICAL CENTER | Age: 75
DRG: 616 | End: 2020-01-01
Payer: MEDICARE

## 2020-01-01 ENCOUNTER — APPOINTMENT (OUTPATIENT)
Dept: WOUND CARE | Facility: MEDICAL CENTER | Age: 75
End: 2020-01-01
Attending: ORTHOPAEDIC SURGERY
Payer: MEDICARE

## 2020-01-01 ENCOUNTER — HOSPITAL ENCOUNTER (EMERGENCY)
Facility: MEDICAL CENTER | Age: 75
End: 2020-12-08
Attending: EMERGENCY MEDICINE
Payer: MEDICARE

## 2020-01-01 ENCOUNTER — APPOINTMENT (OUTPATIENT)
Dept: RADIOLOGY | Facility: MEDICAL CENTER | Age: 75
DRG: 853 | End: 2020-01-01
Attending: STUDENT IN AN ORGANIZED HEALTH CARE EDUCATION/TRAINING PROGRAM
Payer: MEDICARE

## 2020-01-01 ENCOUNTER — OFFICE VISIT (OUTPATIENT)
Dept: WOUND CARE | Facility: MEDICAL CENTER | Age: 75
End: 2020-01-01
Attending: INTERNAL MEDICINE
Payer: MEDICARE

## 2020-01-01 ENCOUNTER — ANESTHESIA EVENT (OUTPATIENT)
Dept: SURGERY | Facility: MEDICAL CENTER | Age: 75
DRG: 616 | End: 2020-01-01
Payer: MEDICARE

## 2020-01-01 ENCOUNTER — APPOINTMENT (OUTPATIENT)
Dept: RADIOLOGY | Facility: MEDICAL CENTER | Age: 75
DRG: 853 | End: 2020-01-01
Attending: PSYCHIATRY & NEUROLOGY
Payer: MEDICARE

## 2020-01-01 ENCOUNTER — HOSPITAL ENCOUNTER (OUTPATIENT)
Dept: LAB | Facility: MEDICAL CENTER | Age: 75
End: 2020-08-13
Attending: NURSE PRACTITIONER
Payer: MEDICARE

## 2020-01-01 ENCOUNTER — OFFICE VISIT (OUTPATIENT)
Dept: CARDIOLOGY | Facility: MEDICAL CENTER | Age: 75
End: 2020-01-01
Payer: MEDICARE

## 2020-01-01 ENCOUNTER — ANESTHESIA EVENT (OUTPATIENT)
Dept: SURGERY | Facility: MEDICAL CENTER | Age: 75
DRG: 853 | End: 2020-01-01
Payer: MEDICARE

## 2020-01-01 ENCOUNTER — TELEPHONE (OUTPATIENT)
Dept: MEDICAL GROUP | Age: 75
End: 2020-01-01

## 2020-01-01 ENCOUNTER — APPOINTMENT (OUTPATIENT)
Dept: CARDIOLOGY | Facility: MEDICAL CENTER | Age: 75
DRG: 853 | End: 2020-01-01
Attending: HOSPITALIST
Payer: MEDICARE

## 2020-01-01 ENCOUNTER — HOSPITAL ENCOUNTER (INPATIENT)
Facility: MEDICAL CENTER | Age: 75
LOS: 13 days | DRG: 616 | End: 2021-01-13
Attending: ORTHOPAEDIC SURGERY
Payer: MEDICARE

## 2020-01-01 ENCOUNTER — TELEMEDICINE (OUTPATIENT)
Dept: MEDICAL GROUP | Age: 75
End: 2020-01-01
Payer: MEDICARE

## 2020-01-01 ENCOUNTER — ANESTHESIA (OUTPATIENT)
Dept: SURGERY | Facility: MEDICAL CENTER | Age: 75
DRG: 853 | End: 2020-01-01
Payer: MEDICARE

## 2020-01-01 ENCOUNTER — PRE-ADMISSION TESTING (OUTPATIENT)
Dept: ADMISSIONS | Facility: MEDICAL CENTER | Age: 75
DRG: 616 | End: 2020-01-01
Attending: ORTHOPAEDIC SURGERY
Payer: MEDICARE

## 2020-01-01 ENCOUNTER — TELEPHONE (OUTPATIENT)
Dept: CARDIOLOGY | Facility: MEDICAL CENTER | Age: 75
End: 2020-01-01

## 2020-01-01 ENCOUNTER — HOSPITAL ENCOUNTER (INPATIENT)
Facility: MEDICAL CENTER | Age: 75
LOS: 6 days | DRG: 853 | End: 2020-11-20
Attending: EMERGENCY MEDICINE | Admitting: HOSPITALIST
Payer: MEDICARE

## 2020-01-01 VITALS
OXYGEN SATURATION: 91 % | SYSTOLIC BLOOD PRESSURE: 99 MMHG | HEART RATE: 92 BPM | DIASTOLIC BLOOD PRESSURE: 40 MMHG | TEMPERATURE: 98.8 F | RESPIRATION RATE: 20 BRPM

## 2020-01-01 VITALS — TEMPERATURE: 97.5 F | BODY MASS INDEX: 28.49 KG/M2 | WEIGHT: 177.25 LBS | HEIGHT: 66 IN

## 2020-01-01 VITALS
BODY MASS INDEX: 29.13 KG/M2 | OXYGEN SATURATION: 100 % | SYSTOLIC BLOOD PRESSURE: 110 MMHG | HEART RATE: 87 BPM | WEIGHT: 174.82 LBS | HEIGHT: 65 IN | DIASTOLIC BLOOD PRESSURE: 72 MMHG | RESPIRATION RATE: 19 BRPM | TEMPERATURE: 98.2 F

## 2020-01-01 VITALS
OXYGEN SATURATION: 94 % | RESPIRATION RATE: 16 BRPM | TEMPERATURE: 99 F | HEIGHT: 66 IN | SYSTOLIC BLOOD PRESSURE: 141 MMHG | WEIGHT: 185.63 LBS | BODY MASS INDEX: 29.83 KG/M2 | DIASTOLIC BLOOD PRESSURE: 71 MMHG | HEART RATE: 93 BPM

## 2020-01-01 VITALS
WEIGHT: 174.2 LBS | BODY MASS INDEX: 29.02 KG/M2 | HEART RATE: 92 BPM | SYSTOLIC BLOOD PRESSURE: 132 MMHG | DIASTOLIC BLOOD PRESSURE: 58 MMHG | HEIGHT: 65 IN

## 2020-01-01 DIAGNOSIS — Z95.5 PRESENCE OF STENT IN LAD CORONARY ARTERY: ICD-10-CM

## 2020-01-01 DIAGNOSIS — I96 NECROSIS OF TOE (HCC): ICD-10-CM

## 2020-01-01 DIAGNOSIS — S91.301A WOUND OF RIGHT FOOT: Primary | ICD-10-CM

## 2020-01-01 DIAGNOSIS — I25.10 CORONARY ARTERY DISEASE, NON-OCCLUSIVE: ICD-10-CM

## 2020-01-01 DIAGNOSIS — L98.499 ARTERIAL INSUFFICIENCY WITH ISCHEMIC ULCER (HCC): ICD-10-CM

## 2020-01-01 DIAGNOSIS — E11.42 DIABETIC POLYNEUROPATHY ASSOCIATED WITH TYPE 2 DIABETES MELLITUS (HCC): ICD-10-CM

## 2020-01-01 DIAGNOSIS — E04.1 THYROID NODULE: ICD-10-CM

## 2020-01-01 DIAGNOSIS — E11.9 DIABETES MELLITUS TYPE 2 IN NONOBESE (HCC): ICD-10-CM

## 2020-01-01 DIAGNOSIS — L97.522 SKIN ULCER OF LEFT GREAT TOE WITH FAT LAYER EXPOSED (HCC): Primary | ICD-10-CM

## 2020-01-01 DIAGNOSIS — I51.89 DIASTOLIC DYSFUNCTION: ICD-10-CM

## 2020-01-01 DIAGNOSIS — I25.5 CARDIOMYOPATHY, ISCHEMIC: ICD-10-CM

## 2020-01-01 DIAGNOSIS — I48.0 PAROXYSMAL A-FIB (HCC): ICD-10-CM

## 2020-01-01 DIAGNOSIS — E05.90 HYPERTHYROIDISM: ICD-10-CM

## 2020-01-01 DIAGNOSIS — S91.301A WOUND OF RIGHT FOOT: ICD-10-CM

## 2020-01-01 DIAGNOSIS — Z79.01 ANTICOAGULATED: ICD-10-CM

## 2020-01-01 DIAGNOSIS — A41.9 SEPSIS WITHOUT ACUTE ORGAN DYSFUNCTION, DUE TO UNSPECIFIED ORGANISM (HCC): ICD-10-CM

## 2020-01-01 DIAGNOSIS — I45.10 RIGHT BUNDLE BRANCH BLOCK: ICD-10-CM

## 2020-01-01 DIAGNOSIS — I10 ESSENTIAL HYPERTENSION: ICD-10-CM

## 2020-01-01 DIAGNOSIS — I96 GANGRENE OF TOE (HCC): ICD-10-CM

## 2020-01-01 DIAGNOSIS — N18.6 END STAGE RENAL DISEASE ON DIALYSIS (HCC): ICD-10-CM

## 2020-01-01 DIAGNOSIS — I77.1 ARTERIAL INSUFFICIENCY WITH ISCHEMIC ULCER (HCC): ICD-10-CM

## 2020-01-01 DIAGNOSIS — Z99.2 END STAGE RENAL DISEASE ON DIALYSIS (HCC): ICD-10-CM

## 2020-01-01 DIAGNOSIS — L08.9 DIABETIC FOOT INFECTION (HCC): ICD-10-CM

## 2020-01-01 DIAGNOSIS — I73.9 PERIPHERAL VASCULAR DISEASE (HCC): ICD-10-CM

## 2020-01-01 DIAGNOSIS — D63.8 ANEMIA OF CHRONIC DISEASE: ICD-10-CM

## 2020-01-01 DIAGNOSIS — R79.89 ELEVATED TROPONIN: ICD-10-CM

## 2020-01-01 DIAGNOSIS — L08.9 WOUND INFECTION: ICD-10-CM

## 2020-01-01 DIAGNOSIS — I44.4 LEFT ANTERIOR FASCICULAR BLOCK: ICD-10-CM

## 2020-01-01 DIAGNOSIS — I25.10 CORONARY ARTERY DISEASE INVOLVING NATIVE CORONARY ARTERY OF NATIVE HEART WITHOUT ANGINA PECTORIS: ICD-10-CM

## 2020-01-01 DIAGNOSIS — E78.5 DYSLIPIDEMIA: ICD-10-CM

## 2020-01-01 DIAGNOSIS — Z01.812 PRE-OPERATIVE LABORATORY EXAMINATION: ICD-10-CM

## 2020-01-01 DIAGNOSIS — R07.9 CHEST PAIN, UNSPECIFIED TYPE: ICD-10-CM

## 2020-01-01 DIAGNOSIS — R79.89 ELEVATED BRAIN NATRIURETIC PEPTIDE (BNP) LEVEL: ICD-10-CM

## 2020-01-01 DIAGNOSIS — E11.628 DIABETIC FOOT INFECTION (HCC): ICD-10-CM

## 2020-01-01 DIAGNOSIS — I21.4 NSTEMI (NON-ST ELEVATED MYOCARDIAL INFARCTION) (HCC): ICD-10-CM

## 2020-01-01 DIAGNOSIS — R06.02 SHORTNESS OF BREATH: ICD-10-CM

## 2020-01-01 DIAGNOSIS — T14.8XXA WOUND INFECTION: ICD-10-CM

## 2020-01-01 LAB
ABO GROUP BLD: NORMAL
ALBUMIN SERPL BCP-MCNC: 2.8 G/DL (ref 3.2–4.9)
ALBUMIN SERPL BCP-MCNC: 3.1 G/DL (ref 3.2–4.9)
ALBUMIN SERPL BCP-MCNC: 3.2 G/DL (ref 3.2–4.9)
ALBUMIN SERPL BCP-MCNC: 3.2 G/DL (ref 3.2–4.9)
ALBUMIN SERPL BCP-MCNC: 3.3 G/DL (ref 3.2–4.9)
ALBUMIN SERPL BCP-MCNC: 3.4 G/DL (ref 3.2–4.9)
ALBUMIN SERPL BCP-MCNC: 3.7 G/DL (ref 3.2–4.9)
ALBUMIN SERPL BCP-MCNC: 3.8 G/DL (ref 3.2–4.9)
ALBUMIN/GLOB SERPL: 0.8 G/DL
ALBUMIN/GLOB SERPL: 0.9 G/DL
ALBUMIN/GLOB SERPL: 1 G/DL
ALBUMIN/GLOB SERPL: 1 G/DL
ALBUMIN/GLOB SERPL: 1.2 G/DL
ALP SERPL-CCNC: 115 U/L (ref 30–99)
ALP SERPL-CCNC: 66 U/L (ref 30–99)
ALP SERPL-CCNC: 68 U/L (ref 30–99)
ALP SERPL-CCNC: 74 U/L (ref 30–99)
ALP SERPL-CCNC: 76 U/L (ref 30–99)
ALP SERPL-CCNC: 77 U/L (ref 30–99)
ALP SERPL-CCNC: 87 U/L (ref 30–99)
ALP SERPL-CCNC: 88 U/L (ref 30–99)
ALT SERPL-CCNC: 12 U/L (ref 2–50)
ALT SERPL-CCNC: 5 U/L (ref 2–50)
ALT SERPL-CCNC: 5 U/L (ref 2–50)
ALT SERPL-CCNC: 6 U/L (ref 2–50)
ALT SERPL-CCNC: 6 U/L (ref 2–50)
ALT SERPL-CCNC: 7 U/L (ref 2–50)
ALT SERPL-CCNC: 9 U/L (ref 2–50)
ALT SERPL-CCNC: <5 U/L (ref 2–50)
AMMONIA PLAS-SCNC: 14 UMOL/L (ref 11–45)
ANION GAP SERPL CALC-SCNC: 11 MMOL/L (ref 7–16)
ANION GAP SERPL CALC-SCNC: 12 MMOL/L (ref 7–16)
ANION GAP SERPL CALC-SCNC: 15 MMOL/L (ref 7–16)
ANION GAP SERPL CALC-SCNC: 16 MMOL/L (ref 7–16)
ANION GAP SERPL CALC-SCNC: 17 MMOL/L (ref 7–16)
ANION GAP SERPL CALC-SCNC: 17 MMOL/L (ref 7–16)
ANION GAP SERPL CALC-SCNC: 18 MMOL/L (ref 7–16)
ANION GAP SERPL CALC-SCNC: 19 MMOL/L (ref 7–16)
APPEARANCE UR: CLEAR
APTT PPP: 33 SEC (ref 24.7–36)
AST SERPL-CCNC: 10 U/L (ref 12–45)
AST SERPL-CCNC: 11 U/L (ref 12–45)
AST SERPL-CCNC: 13 U/L (ref 12–45)
AST SERPL-CCNC: 13 U/L (ref 12–45)
AST SERPL-CCNC: 16 U/L (ref 12–45)
AST SERPL-CCNC: 6 U/L (ref 12–45)
AST SERPL-CCNC: 6 U/L (ref 12–45)
AST SERPL-CCNC: 8 U/L (ref 12–45)
BACTERIA #/AREA URNS HPF: NEGATIVE /HPF
BACTERIA BLD CULT: NORMAL
BARCODED ABORH UBTYP: 2800
BARCODED PRD CODE UBPRD: NORMAL
BARCODED UNIT NUM UBUNT: NORMAL
BASE EXCESS BLDA CALC-SCNC: -3 MMOL/L (ref -4–3)
BASOPHILS # BLD AUTO: 0.1 % (ref 0–1.8)
BASOPHILS # BLD AUTO: 0.2 % (ref 0–1.8)
BASOPHILS # BLD AUTO: 0.2 % (ref 0–1.8)
BASOPHILS # BLD AUTO: 0.3 % (ref 0–1.8)
BASOPHILS # BLD AUTO: 0.4 % (ref 0–1.8)
BASOPHILS # BLD: 0.02 K/UL (ref 0–0.12)
BASOPHILS # BLD: 0.03 K/UL (ref 0–0.12)
BASOPHILS # BLD: 0.04 K/UL (ref 0–0.12)
BASOPHILS # BLD: 0.04 K/UL (ref 0–0.12)
BASOPHILS # BLD: 0.05 K/UL (ref 0–0.12)
BASOPHILS # BLD: 0.05 K/UL (ref 0–0.12)
BILIRUB SERPL-MCNC: 0.4 MG/DL (ref 0.1–1.5)
BILIRUB UR QL STRIP.AUTO: NEGATIVE
BLD GP AB SCN SERPL QL: NORMAL
BODY TEMPERATURE: NORMAL CENTIGRADE
BUN SERPL-MCNC: 25 MG/DL (ref 8–22)
BUN SERPL-MCNC: 26 MG/DL (ref 8–22)
BUN SERPL-MCNC: 38 MG/DL (ref 8–22)
BUN SERPL-MCNC: 39 MG/DL (ref 8–22)
BUN SERPL-MCNC: 40 MG/DL (ref 8–22)
BUN SERPL-MCNC: 47 MG/DL (ref 8–22)
BUN SERPL-MCNC: 48 MG/DL (ref 8–22)
BUN SERPL-MCNC: 50 MG/DL (ref 8–22)
CALCIUM SERPL-MCNC: 8.8 MG/DL (ref 8.5–10.5)
CALCIUM SERPL-MCNC: 9 MG/DL (ref 8.4–10.2)
CALCIUM SERPL-MCNC: 9 MG/DL (ref 8.5–10.5)
CALCIUM SERPL-MCNC: 9.2 MG/DL (ref 8.5–10.5)
CALCIUM SERPL-MCNC: 9.2 MG/DL (ref 8.5–10.5)
CALCIUM SERPL-MCNC: 9.6 MG/DL (ref 8.5–10.5)
CALCIUM SERPL-MCNC: 9.7 MG/DL (ref 8.5–10.5)
CALCIUM SERPL-MCNC: 9.8 MG/DL (ref 8.5–10.5)
CHLORIDE SERPL-SCNC: 90 MMOL/L (ref 96–112)
CHLORIDE SERPL-SCNC: 91 MMOL/L (ref 96–112)
CHLORIDE SERPL-SCNC: 92 MMOL/L (ref 96–112)
CHLORIDE SERPL-SCNC: 92 MMOL/L (ref 96–112)
CHLORIDE SERPL-SCNC: 94 MMOL/L (ref 96–112)
CHLORIDE SERPL-SCNC: 99 MMOL/L (ref 96–112)
CHOLEST SERPL-MCNC: 149 MG/DL (ref 100–199)
CK SERPL-CCNC: 81 U/L (ref 0–154)
CO2 SERPL-SCNC: 20 MMOL/L (ref 20–33)
CO2 SERPL-SCNC: 22 MMOL/L (ref 20–33)
CO2 SERPL-SCNC: 23 MMOL/L (ref 20–33)
CO2 SERPL-SCNC: 26 MMOL/L (ref 20–33)
CO2 SERPL-SCNC: 27 MMOL/L (ref 20–33)
CO2 SERPL-SCNC: 29 MMOL/L (ref 20–33)
COLOR UR: YELLOW
COMPONENT R 8504R: NORMAL
COVID ORDER STATUS COVID19: NORMAL
CREAT SERPL-MCNC: 4.06 MG/DL (ref 0.5–1.4)
CREAT SERPL-MCNC: 4.31 MG/DL (ref 0.5–1.4)
CREAT SERPL-MCNC: 5.92 MG/DL (ref 0.5–1.4)
CREAT SERPL-MCNC: 6.06 MG/DL (ref 0.5–1.4)
CREAT SERPL-MCNC: 6.31 MG/DL (ref 0.5–1.4)
CREAT SERPL-MCNC: 6.41 MG/DL (ref 0.5–1.4)
CREAT SERPL-MCNC: 6.99 MG/DL (ref 0.5–1.4)
CREAT SERPL-MCNC: 7.09 MG/DL (ref 0.5–1.4)
CRP SERPL HS-MCNC: 24.11 MG/DL (ref 0–0.75)
D DIMER PPP IA.FEU-MCNC: 0.73 UG/ML (FEU) (ref 0–0.5)
EKG IMPRESSION: NORMAL
EOSINOPHIL # BLD AUTO: 0 K/UL (ref 0–0.51)
EOSINOPHIL # BLD AUTO: 0.01 K/UL (ref 0–0.51)
EOSINOPHIL NFR BLD: 0 % (ref 0–6.9)
EOSINOPHIL NFR BLD: 0.1 % (ref 0–6.9)
EPI CELLS #/AREA URNS HPF: ABNORMAL /HPF
ERYTHROCYTE [DISTWIDTH] IN BLOOD BY AUTOMATED COUNT: 51.5 FL (ref 35.9–50)
ERYTHROCYTE [DISTWIDTH] IN BLOOD BY AUTOMATED COUNT: 51.6 FL (ref 35.9–50)
ERYTHROCYTE [DISTWIDTH] IN BLOOD BY AUTOMATED COUNT: 51.8 FL (ref 35.9–50)
ERYTHROCYTE [DISTWIDTH] IN BLOOD BY AUTOMATED COUNT: 52.1 FL (ref 35.9–50)
ERYTHROCYTE [DISTWIDTH] IN BLOOD BY AUTOMATED COUNT: 52.4 FL (ref 35.9–50)
ERYTHROCYTE [DISTWIDTH] IN BLOOD BY AUTOMATED COUNT: 52.6 FL (ref 35.9–50)
ERYTHROCYTE [DISTWIDTH] IN BLOOD BY AUTOMATED COUNT: 53.1 FL (ref 35.9–50)
ERYTHROCYTE [DISTWIDTH] IN BLOOD BY AUTOMATED COUNT: 53.5 FL (ref 35.9–50)
ERYTHROCYTE [DISTWIDTH] IN BLOOD BY AUTOMATED COUNT: 64.8 FL (ref 35.9–50)
ERYTHROCYTE [SEDIMENTATION RATE] IN BLOOD BY WESTERGREN METHOD: >120 MM/HOUR (ref 0–20)
EST. AVERAGE GLUCOSE BLD GHB EST-MCNC: 177 MG/DL
FERRITIN SERPL-MCNC: 1664 NG/ML (ref 22–322)
FLUAV RNA SPEC QL NAA+PROBE: NEGATIVE
FLUAV RNA SPEC QL NAA+PROBE: NEGATIVE
FLUBV RNA SPEC QL NAA+PROBE: NEGATIVE
FLUBV RNA SPEC QL NAA+PROBE: NEGATIVE
GLOBULIN SER CALC-MCNC: 3.1 G/DL (ref 1.9–3.5)
GLOBULIN SER CALC-MCNC: 3.3 G/DL (ref 1.9–3.5)
GLOBULIN SER CALC-MCNC: 3.4 G/DL (ref 1.9–3.5)
GLOBULIN SER CALC-MCNC: 3.5 G/DL (ref 1.9–3.5)
GLOBULIN SER CALC-MCNC: 3.6 G/DL (ref 1.9–3.5)
GLOBULIN SER CALC-MCNC: 3.9 G/DL (ref 1.9–3.5)
GLUCOSE BLD-MCNC: 106 MG/DL (ref 65–99)
GLUCOSE BLD-MCNC: 107 MG/DL (ref 65–99)
GLUCOSE BLD-MCNC: 107 MG/DL (ref 65–99)
GLUCOSE BLD-MCNC: 117 MG/DL (ref 65–99)
GLUCOSE BLD-MCNC: 123 MG/DL (ref 65–99)
GLUCOSE BLD-MCNC: 125 MG/DL (ref 65–99)
GLUCOSE BLD-MCNC: 127 MG/DL (ref 65–99)
GLUCOSE BLD-MCNC: 130 MG/DL (ref 65–99)
GLUCOSE BLD-MCNC: 131 MG/DL (ref 65–99)
GLUCOSE BLD-MCNC: 131 MG/DL (ref 65–99)
GLUCOSE BLD-MCNC: 132 MG/DL (ref 65–99)
GLUCOSE BLD-MCNC: 139 MG/DL (ref 65–99)
GLUCOSE BLD-MCNC: 143 MG/DL (ref 65–99)
GLUCOSE BLD-MCNC: 146 MG/DL (ref 65–99)
GLUCOSE BLD-MCNC: 152 MG/DL (ref 65–99)
GLUCOSE BLD-MCNC: 153 MG/DL (ref 65–99)
GLUCOSE BLD-MCNC: 155 MG/DL (ref 65–99)
GLUCOSE BLD-MCNC: 159 MG/DL (ref 65–99)
GLUCOSE BLD-MCNC: 181 MG/DL (ref 65–99)
GLUCOSE BLD-MCNC: 188 MG/DL (ref 65–99)
GLUCOSE BLD-MCNC: 189 MG/DL (ref 65–99)
GLUCOSE BLD-MCNC: 234 MG/DL (ref 65–99)
GLUCOSE BLD-MCNC: 88 MG/DL (ref 65–99)
GLUCOSE SERPL-MCNC: 125 MG/DL (ref 65–99)
GLUCOSE SERPL-MCNC: 127 MG/DL (ref 65–99)
GLUCOSE SERPL-MCNC: 136 MG/DL (ref 65–99)
GLUCOSE SERPL-MCNC: 142 MG/DL (ref 65–99)
GLUCOSE SERPL-MCNC: 158 MG/DL (ref 65–99)
GLUCOSE SERPL-MCNC: 158 MG/DL (ref 65–99)
GLUCOSE SERPL-MCNC: 160 MG/DL (ref 65–99)
GLUCOSE SERPL-MCNC: 177 MG/DL (ref 65–99)
GLUCOSE UR STRIP.AUTO-MCNC: 250 MG/DL
HBA1C MFR BLD: 7.8 % (ref 0–5.6)
HCO3 BLDA-SCNC: 21 MMOL/L (ref 17–25)
HCT VFR BLD AUTO: 20.3 % (ref 42–52)
HCT VFR BLD AUTO: 20.5 % (ref 42–52)
HCT VFR BLD AUTO: 21.3 % (ref 42–52)
HCT VFR BLD AUTO: 21.8 % (ref 42–52)
HCT VFR BLD AUTO: 22.9 % (ref 42–52)
HCT VFR BLD AUTO: 23.3 % (ref 42–52)
HCT VFR BLD AUTO: 23.8 % (ref 42–52)
HCT VFR BLD AUTO: 24.5 % (ref 42–52)
HCT VFR BLD AUTO: 25.7 % (ref 42–52)
HCT VFR BLD AUTO: 26.5 % (ref 42–52)
HDLC SERPL-MCNC: 21 MG/DL
HEMOCCULT STL QL: NEGATIVE
HGB BLD-MCNC: 6.5 G/DL (ref 14–18)
HGB BLD-MCNC: 6.9 G/DL (ref 14–18)
HGB BLD-MCNC: 7 G/DL (ref 14–18)
HGB BLD-MCNC: 7.1 G/DL (ref 14–18)
HGB BLD-MCNC: 7.7 G/DL (ref 14–18)
HGB BLD-MCNC: 7.9 G/DL (ref 14–18)
HGB BLD-MCNC: 8.4 G/DL (ref 14–18)
HGB BLD-MCNC: 8.5 G/DL (ref 14–18)
HIV 1+2 AB+HIV1 P24 AG SERPL QL IA: NORMAL
HYALINE CASTS #/AREA URNS LPF: ABNORMAL /LPF
IMM GRANULOCYTES # BLD AUTO: 0.04 K/UL (ref 0–0.11)
IMM GRANULOCYTES # BLD AUTO: 0.12 K/UL (ref 0–0.11)
IMM GRANULOCYTES # BLD AUTO: 0.12 K/UL (ref 0–0.11)
IMM GRANULOCYTES # BLD AUTO: 0.13 K/UL (ref 0–0.11)
IMM GRANULOCYTES # BLD AUTO: 0.16 K/UL (ref 0–0.11)
IMM GRANULOCYTES # BLD AUTO: 0.17 K/UL (ref 0–0.11)
IMM GRANULOCYTES # BLD AUTO: 0.19 K/UL (ref 0–0.11)
IMM GRANULOCYTES # BLD AUTO: 0.27 K/UL (ref 0–0.11)
IMM GRANULOCYTES NFR BLD AUTO: 0.5 % (ref 0–0.9)
IMM GRANULOCYTES NFR BLD AUTO: 0.8 % (ref 0–0.9)
IMM GRANULOCYTES NFR BLD AUTO: 0.8 % (ref 0–0.9)
IMM GRANULOCYTES NFR BLD AUTO: 1.1 % (ref 0–0.9)
IMM GRANULOCYTES NFR BLD AUTO: 1.1 % (ref 0–0.9)
IMM GRANULOCYTES NFR BLD AUTO: 1.2 % (ref 0–0.9)
IMM GRANULOCYTES NFR BLD AUTO: 1.4 % (ref 0–0.9)
IMM GRANULOCYTES NFR BLD AUTO: 2.1 % (ref 0–0.9)
INHALED O2 FLOW RATE: 2 L/MIN (ref 2–10)
INR PPP: 1.39 (ref 0.87–1.13)
IRON SATN MFR SERPL: 19 % (ref 15–55)
IRON SERPL-MCNC: 30 UG/DL (ref 50–180)
KETONES UR STRIP.AUTO-MCNC: NEGATIVE MG/DL
LACTATE BLD-SCNC: 1.3 MMOL/L (ref 0.5–2)
LACTATE BLD-SCNC: 1.6 MMOL/L (ref 0.5–2)
LACTATE BLD-SCNC: 1.8 MMOL/L (ref 0.5–2)
LDLC SERPL CALC-MCNC: 85 MG/DL
LEUKOCYTE ESTERASE UR QL STRIP.AUTO: NEGATIVE
LV EJECT FRACT  99904: 55
LV EJECT FRACT MOD 2C 99903: 59.29
LV EJECT FRACT MOD 4C 99902: 63.26
LV EJECT FRACT MOD BP 99901: 61.14
LYMPHOCYTES # BLD AUTO: 0.67 K/UL (ref 1–4.8)
LYMPHOCYTES # BLD AUTO: 0.82 K/UL (ref 1–4.8)
LYMPHOCYTES # BLD AUTO: 0.89 K/UL (ref 1–4.8)
LYMPHOCYTES # BLD AUTO: 0.94 K/UL (ref 1–4.8)
LYMPHOCYTES # BLD AUTO: 1.05 K/UL (ref 1–4.8)
LYMPHOCYTES # BLD AUTO: 1.14 K/UL (ref 1–4.8)
LYMPHOCYTES # BLD AUTO: 1.14 K/UL (ref 1–4.8)
LYMPHOCYTES # BLD AUTO: 1.49 K/UL (ref 1–4.8)
LYMPHOCYTES NFR BLD: 14.7 % (ref 22–41)
LYMPHOCYTES NFR BLD: 4.6 % (ref 22–41)
LYMPHOCYTES NFR BLD: 5.9 % (ref 22–41)
LYMPHOCYTES NFR BLD: 5.9 % (ref 22–41)
LYMPHOCYTES NFR BLD: 7.7 % (ref 22–41)
LYMPHOCYTES NFR BLD: 8.2 % (ref 22–41)
LYMPHOCYTES NFR BLD: 8.8 % (ref 22–41)
LYMPHOCYTES NFR BLD: 9.8 % (ref 22–41)
MAGNESIUM SERPL-MCNC: 1.9 MG/DL (ref 1.5–2.5)
MAGNESIUM SERPL-MCNC: 2 MG/DL (ref 1.5–2.5)
MAGNESIUM SERPL-MCNC: 2 MG/DL (ref 1.5–2.5)
MCH RBC QN AUTO: 32.5 PG (ref 27–33)
MCH RBC QN AUTO: 32.5 PG (ref 27–33)
MCH RBC QN AUTO: 32.8 PG (ref 27–33)
MCH RBC QN AUTO: 33 PG (ref 27–33)
MCH RBC QN AUTO: 33.2 PG (ref 27–33)
MCH RBC QN AUTO: 33.3 PG (ref 27–33)
MCH RBC QN AUTO: 33.6 PG (ref 27–33)
MCH RBC QN AUTO: 33.7 PG (ref 27–33)
MCH RBC QN AUTO: 34.7 PG (ref 27–33)
MCHC RBC AUTO-ENTMCNC: 31.7 G/DL (ref 33.7–35.3)
MCHC RBC AUTO-ENTMCNC: 32 G/DL (ref 33.7–35.3)
MCHC RBC AUTO-ENTMCNC: 32.2 G/DL (ref 33.7–35.3)
MCHC RBC AUTO-ENTMCNC: 32.4 G/DL (ref 33.7–35.3)
MCHC RBC AUTO-ENTMCNC: 32.4 G/DL (ref 33.7–35.3)
MCHC RBC AUTO-ENTMCNC: 32.6 G/DL (ref 33.7–35.3)
MCHC RBC AUTO-ENTMCNC: 33 G/DL (ref 33.7–35.3)
MCHC RBC AUTO-ENTMCNC: 33.1 G/DL (ref 33.7–35.3)
MCHC RBC AUTO-ENTMCNC: 34.1 G/DL (ref 33.7–35.3)
MCV RBC AUTO: 100.5 FL (ref 81.4–97.8)
MCV RBC AUTO: 100.8 FL (ref 81.4–97.8)
MCV RBC AUTO: 100.9 FL (ref 81.4–97.8)
MCV RBC AUTO: 101.4 FL (ref 81.4–97.8)
MCV RBC AUTO: 101.5 FL (ref 81.4–97.8)
MCV RBC AUTO: 102 FL (ref 81.4–97.8)
MCV RBC AUTO: 102.5 FL (ref 81.4–97.8)
MCV RBC AUTO: 103.9 FL (ref 81.4–97.8)
MCV RBC AUTO: 104.7 FL (ref 81.4–97.8)
MICRO URNS: ABNORMAL
MONOCYTES # BLD AUTO: 0.7 K/UL (ref 0–0.85)
MONOCYTES # BLD AUTO: 1.09 K/UL (ref 0–0.85)
MONOCYTES # BLD AUTO: 1.11 K/UL (ref 0–0.85)
MONOCYTES # BLD AUTO: 1.16 K/UL (ref 0–0.85)
MONOCYTES # BLD AUTO: 1.2 K/UL (ref 0–0.85)
MONOCYTES # BLD AUTO: 1.23 K/UL (ref 0–0.85)
MONOCYTES # BLD AUTO: 1.33 K/UL (ref 0–0.85)
MONOCYTES # BLD AUTO: 1.37 K/UL (ref 0–0.85)
MONOCYTES NFR BLD AUTO: 10.4 % (ref 0–13.4)
MONOCYTES NFR BLD AUTO: 8.1 % (ref 0–13.4)
MONOCYTES NFR BLD AUTO: 8.3 % (ref 0–13.4)
MONOCYTES NFR BLD AUTO: 8.4 % (ref 0–13.4)
MONOCYTES NFR BLD AUTO: 8.4 % (ref 0–13.4)
MONOCYTES NFR BLD AUTO: 9 % (ref 0–13.4)
MONOCYTES NFR BLD AUTO: 9.1 % (ref 0–13.4)
MONOCYTES NFR BLD AUTO: 9.1 % (ref 0–13.4)
NEUTROPHILS # BLD AUTO: 10.07 K/UL (ref 1.82–7.42)
NEUTROPHILS # BLD AUTO: 10.58 K/UL (ref 1.82–7.42)
NEUTROPHILS # BLD AUTO: 11.7 K/UL (ref 1.82–7.42)
NEUTROPHILS # BLD AUTO: 12.32 K/UL (ref 1.82–7.42)
NEUTROPHILS # BLD AUTO: 12.49 K/UL (ref 1.82–7.42)
NEUTROPHILS # BLD AUTO: 12.6 K/UL (ref 1.82–7.42)
NEUTROPHILS # BLD AUTO: 5.85 K/UL (ref 1.82–7.42)
NEUTROPHILS # BLD AUTO: 9.95 K/UL (ref 1.82–7.42)
NEUTROPHILS NFR BLD: 75.4 % (ref 44–72)
NEUTROPHILS NFR BLD: 79.1 % (ref 44–72)
NEUTROPHILS NFR BLD: 80.9 % (ref 44–72)
NEUTROPHILS NFR BLD: 81.2 % (ref 44–72)
NEUTROPHILS NFR BLD: 81.8 % (ref 44–72)
NEUTROPHILS NFR BLD: 83.6 % (ref 44–72)
NEUTROPHILS NFR BLD: 84.2 % (ref 44–72)
NEUTROPHILS NFR BLD: 85.9 % (ref 44–72)
NITRITE UR QL STRIP.AUTO: NEGATIVE
NRBC # BLD AUTO: 0 K/UL
NRBC # BLD AUTO: 0.02 K/UL
NRBC # BLD AUTO: 0.02 K/UL
NRBC BLD-RTO: 0 /100 WBC
NRBC BLD-RTO: 0.1 /100 WBC
NRBC BLD-RTO: 0.2 /100 WBC
NT-PROBNP SERPL IA-MCNC: 9224 PG/ML (ref 0–125)
PATHOLOGY CONSULT NOTE: NORMAL
PCO2 BLDA: 33.4 MMHG (ref 26–37)
PH BLDA: 7.42 [PH] (ref 7.4–7.5)
PH UR STRIP.AUTO: 7.5 [PH] (ref 5–8)
PHOSPHATE SERPL-MCNC: 4.9 MG/DL (ref 2.5–4.5)
PHOSPHATE SERPL-MCNC: 5 MG/DL (ref 2.5–4.5)
PLATELET # BLD AUTO: 225 K/UL (ref 164–446)
PLATELET # BLD AUTO: 340 K/UL (ref 164–446)
PLATELET # BLD AUTO: 347 K/UL (ref 164–446)
PLATELET # BLD AUTO: 356 K/UL (ref 164–446)
PLATELET # BLD AUTO: 362 K/UL (ref 164–446)
PLATELET # BLD AUTO: 380 K/UL (ref 164–446)
PLATELET # BLD AUTO: 380 K/UL (ref 164–446)
PLATELET # BLD AUTO: 399 K/UL (ref 164–446)
PLATELET # BLD AUTO: 402 K/UL (ref 164–446)
PMV BLD AUTO: 8.7 FL (ref 9–12.9)
PMV BLD AUTO: 8.8 FL (ref 9–12.9)
PMV BLD AUTO: 9.2 FL (ref 9–12.9)
PMV BLD AUTO: 9.2 FL (ref 9–12.9)
PO2 BLDA: 82.5 MMHG (ref 64–87)
POTASSIUM SERPL-SCNC: 3.7 MMOL/L (ref 3.6–5.5)
POTASSIUM SERPL-SCNC: 3.8 MMOL/L (ref 3.6–5.5)
POTASSIUM SERPL-SCNC: 4.3 MMOL/L (ref 3.6–5.5)
POTASSIUM SERPL-SCNC: 4.4 MMOL/L (ref 3.6–5.5)
POTASSIUM SERPL-SCNC: 4.5 MMOL/L (ref 3.6–5.5)
POTASSIUM SERPL-SCNC: 4.5 MMOL/L (ref 3.6–5.5)
POTASSIUM SERPL-SCNC: 5 MMOL/L (ref 3.6–5.5)
PRODUCT TYPE UPROD: NORMAL
PROT SERPL-MCNC: 6.3 G/DL (ref 6–8.2)
PROT SERPL-MCNC: 6.5 G/DL (ref 6–8.2)
PROT SERPL-MCNC: 6.6 G/DL (ref 6–8.2)
PROT SERPL-MCNC: 6.8 G/DL (ref 6–8.2)
PROT SERPL-MCNC: 6.9 G/DL (ref 6–8.2)
PROT SERPL-MCNC: 7.6 G/DL (ref 6–8.2)
PROT UR QL STRIP: 100 MG/DL
PROTHROMBIN TIME: 17.5 SEC (ref 12–14.6)
RBC # BLD AUTO: 1.98 M/UL (ref 4.7–6.1)
RBC # BLD AUTO: 2.02 M/UL (ref 4.7–6.1)
RBC # BLD AUTO: 2.12 M/UL (ref 4.7–6.1)
RBC # BLD AUTO: 2.15 M/UL (ref 4.7–6.1)
RBC # BLD AUTO: 2.29 M/UL (ref 4.7–6.1)
RBC # BLD AUTO: 2.31 M/UL (ref 4.7–6.1)
RBC # BLD AUTO: 2.43 M/UL (ref 4.7–6.1)
RBC # BLD AUTO: 2.52 M/UL (ref 4.7–6.1)
RBC # BLD AUTO: 2.53 M/UL (ref 4.7–6.1)
RBC # URNS HPF: ABNORMAL /HPF
RBC UR QL AUTO: NEGATIVE
RH BLD: NORMAL
RSV RNA SPEC QL NAA+PROBE: NEGATIVE
RSV RNA SPEC QL NAA+PROBE: NEGATIVE
SAO2 % BLDA: 95.3 % (ref 93–99)
SARS-COV-2 RNA RESP QL NAA+PROBE: NOTDETECTED
SIGNIFICANT IND 70042: NORMAL
SITE SITE: NORMAL
SODIUM SERPL-SCNC: 128 MMOL/L (ref 135–145)
SODIUM SERPL-SCNC: 129 MMOL/L (ref 135–145)
SODIUM SERPL-SCNC: 130 MMOL/L (ref 135–145)
SODIUM SERPL-SCNC: 131 MMOL/L (ref 135–145)
SODIUM SERPL-SCNC: 133 MMOL/L (ref 135–145)
SODIUM SERPL-SCNC: 134 MMOL/L (ref 135–145)
SODIUM SERPL-SCNC: 134 MMOL/L (ref 135–145)
SODIUM SERPL-SCNC: 139 MMOL/L (ref 135–145)
SOURCE SOURCE: NORMAL
SP GR UR STRIP.AUTO: 1.02
SPECIMEN SOURCE: NORMAL
T4 FREE SERPL-MCNC: 1.71 NG/DL (ref 0.93–1.7)
T4 FREE SERPL-MCNC: 1.77 NG/DL (ref 0.93–1.7)
TIBC SERPL-MCNC: 159 UG/DL (ref 250–450)
TREPONEMA PALLIDUM IGG+IGM AB [PRESENCE] IN SERUM OR PLASMA BY IMMUNOASSAY: NORMAL
TRIGL SERPL-MCNC: 213 MG/DL (ref 0–149)
TROPONIN T SERPL-MCNC: 126 NG/L (ref 6–19)
TROPONIN T SERPL-MCNC: 131 NG/L (ref 6–19)
TROPONIN T SERPL-MCNC: 139 NG/L (ref 6–19)
TROPONIN T SERPL-MCNC: 142 NG/L (ref 6–19)
TSH SERPL DL<=0.005 MIU/L-ACNC: 0.15 UIU/ML (ref 0.38–5.33)
TSH SERPL DL<=0.005 MIU/L-ACNC: 0.19 UIU/ML (ref 0.38–5.33)
UIBC SERPL-MCNC: 129 UG/DL (ref 110–370)
UNIT STATUS USTAT: NORMAL
UROBILINOGEN UR STRIP.AUTO-MCNC: 0.2 MG/DL
VANCOMYCIN SERPL-MCNC: 14.1 UG/ML
VANCOMYCIN TROUGH SERPL-MCNC: 15.3 UG/ML (ref 10–20)
VANCOMYCIN TROUGH SERPL-MCNC: 24.5 UG/ML (ref 10–20)
VIT B1 BLD-MCNC: 102 NMOL/L (ref 70–180)
VIT B12 SERPL-MCNC: 477 PG/ML (ref 211–911)
WBC # BLD AUTO: 12.2 K/UL (ref 4.8–10.8)
WBC # BLD AUTO: 12.7 K/UL (ref 4.8–10.8)
WBC # BLD AUTO: 13 K/UL (ref 4.8–10.8)
WBC # BLD AUTO: 13.7 K/UL (ref 4.8–10.8)
WBC # BLD AUTO: 13.9 K/UL (ref 4.8–10.8)
WBC # BLD AUTO: 14.5 K/UL (ref 4.8–10.8)
WBC # BLD AUTO: 15.1 K/UL (ref 4.8–10.8)
WBC # BLD AUTO: 15.2 K/UL (ref 4.8–10.8)
WBC # BLD AUTO: 7.8 K/UL (ref 4.8–10.8)
WBC #/AREA URNS HPF: ABNORMAL /HPF

## 2020-01-01 PROCEDURE — A9270 NON-COVERED ITEM OR SERVICE: HCPCS | Performed by: EMERGENCY MEDICINE

## 2020-01-01 PROCEDURE — 770006 HCHG ROOM/CARE - MED/SURG/GYN SEMI*

## 2020-01-01 PROCEDURE — 160035 HCHG PACU - 1ST 60 MINS PHASE I: Performed by: ORTHOPAEDIC SURGERY

## 2020-01-01 PROCEDURE — 770020 HCHG ROOM/CARE - TELE (206)

## 2020-01-01 PROCEDURE — 96375 TX/PRO/DX INJ NEW DRUG ADDON: CPT

## 2020-01-01 PROCEDURE — A9270 NON-COVERED ITEM OR SERVICE: HCPCS | Performed by: HOSPITALIST

## 2020-01-01 PROCEDURE — 99285 EMERGENCY DEPT VISIT HI MDM: CPT

## 2020-01-01 PROCEDURE — 700111 HCHG RX REV CODE 636 W/ 250 OVERRIDE (IP): Performed by: ANESTHESIOLOGY

## 2020-01-01 PROCEDURE — 97165 OT EVAL LOW COMPLEX 30 MIN: CPT

## 2020-01-01 PROCEDURE — A9270 NON-COVERED ITEM OR SERVICE: HCPCS | Performed by: STUDENT IN AN ORGANIZED HEALTH CARE EDUCATION/TRAINING PROGRAM

## 2020-01-01 PROCEDURE — 93005 ELECTROCARDIOGRAM TRACING: CPT | Performed by: EMERGENCY MEDICINE

## 2020-01-01 PROCEDURE — 82803 BLOOD GASES ANY COMBINATION: CPT

## 2020-01-01 PROCEDURE — 85025 COMPLETE CBC W/AUTO DIFF WBC: CPT

## 2020-01-01 PROCEDURE — 700102 HCHG RX REV CODE 250 W/ 637 OVERRIDE(OP): Performed by: STUDENT IN AN ORGANIZED HEALTH CARE EDUCATION/TRAINING PROGRAM

## 2020-01-01 PROCEDURE — 96376 TX/PRO/DX INJ SAME DRUG ADON: CPT

## 2020-01-01 PROCEDURE — 88304 TISSUE EXAM BY PATHOLOGIST: CPT

## 2020-01-01 PROCEDURE — 700111 HCHG RX REV CODE 636 W/ 250 OVERRIDE (IP): Performed by: ORTHOPAEDIC SURGERY

## 2020-01-01 PROCEDURE — 83880 ASSAY OF NATRIURETIC PEPTIDE: CPT

## 2020-01-01 PROCEDURE — 80202 ASSAY OF VANCOMYCIN: CPT

## 2020-01-01 PROCEDURE — 99214 OFFICE O/P EST MOD 30 MIN: CPT | Mod: 95,CR | Performed by: INTERNAL MEDICINE

## 2020-01-01 PROCEDURE — 97161 PT EVAL LOW COMPLEX 20 MIN: CPT

## 2020-01-01 PROCEDURE — 84439 ASSAY OF FREE THYROXINE: CPT

## 2020-01-01 PROCEDURE — 36415 COLL VENOUS BLD VENIPUNCTURE: CPT

## 2020-01-01 PROCEDURE — 99232 SBSQ HOSP IP/OBS MODERATE 35: CPT | Performed by: NURSE PRACTITIONER

## 2020-01-01 PROCEDURE — 84443 ASSAY THYROID STIM HORMONE: CPT

## 2020-01-01 PROCEDURE — 86140 C-REACTIVE PROTEIN: CPT

## 2020-01-01 PROCEDURE — 97597 DBRDMT OPN WND 1ST 20 CM/<: CPT

## 2020-01-01 PROCEDURE — 770001 HCHG ROOM/CARE - MED/SURG/GYN PRIV*

## 2020-01-01 PROCEDURE — 3E0T3BZ INTRODUCTION OF ANESTHETIC AGENT INTO PERIPHERAL NERVES AND PLEXI, PERCUTANEOUS APPROACH: ICD-10-PCS | Performed by: ANESTHESIOLOGY

## 2020-01-01 PROCEDURE — 99238 HOSP IP/OBS DSCHRG MGMT 30/<: CPT | Performed by: INTERNAL MEDICINE

## 2020-01-01 PROCEDURE — 86901 BLOOD TYPING SEROLOGIC RH(D): CPT

## 2020-01-01 PROCEDURE — 87040 BLOOD CULTURE FOR BACTERIA: CPT | Mod: 91

## 2020-01-01 PROCEDURE — 0042T CT-CEREBRAL PERFUSION ANALYSIS: CPT

## 2020-01-01 PROCEDURE — 99214 OFFICE O/P EST MOD 30 MIN: CPT | Performed by: NURSE PRACTITIONER

## 2020-01-01 PROCEDURE — 84100 ASSAY OF PHOSPHORUS: CPT

## 2020-01-01 PROCEDURE — 160009 HCHG ANES TIME/MIN: Performed by: ORTHOPAEDIC SURGERY

## 2020-01-01 PROCEDURE — 73630 X-RAY EXAM OF FOOT: CPT | Mod: RT

## 2020-01-01 PROCEDURE — 80053 COMPREHEN METABOLIC PANEL: CPT

## 2020-01-01 PROCEDURE — 700117 HCHG RX CONTRAST REV CODE 255: Performed by: EMERGENCY MEDICINE

## 2020-01-01 PROCEDURE — 71275 CT ANGIOGRAPHY CHEST: CPT

## 2020-01-01 PROCEDURE — 700105 HCHG RX REV CODE 258: Performed by: ORTHOPAEDIC SURGERY

## 2020-01-01 PROCEDURE — 700101 HCHG RX REV CODE 250: Performed by: ORTHOPAEDIC SURGERY

## 2020-01-01 PROCEDURE — 82962 GLUCOSE BLOOD TEST: CPT

## 2020-01-01 PROCEDURE — 0QBN0ZX EXCISION OF RIGHT METATARSAL, OPEN APPROACH, DIAGNOSTIC: ICD-10-PCS | Performed by: ORTHOPAEDIC SURGERY

## 2020-01-01 PROCEDURE — 83735 ASSAY OF MAGNESIUM: CPT

## 2020-01-01 PROCEDURE — 84484 ASSAY OF TROPONIN QUANT: CPT | Mod: 91

## 2020-01-01 PROCEDURE — 64445 NJX AA&/STRD SCIATIC NRV IMG: CPT | Performed by: ORTHOPAEDIC SURGERY

## 2020-01-01 PROCEDURE — 700102 HCHG RX REV CODE 250 W/ 637 OVERRIDE(OP): Performed by: HOSPITALIST

## 2020-01-01 PROCEDURE — 85379 FIBRIN DEGRADATION QUANT: CPT

## 2020-01-01 PROCEDURE — 500881 HCHG PACK, EXTREMITY: Performed by: ORTHOPAEDIC SURGERY

## 2020-01-01 PROCEDURE — 88311 DECALCIFY TISSUE: CPT

## 2020-01-01 PROCEDURE — 80061 LIPID PANEL: CPT

## 2020-01-01 PROCEDURE — 90935 HEMODIALYSIS ONE EVALUATION: CPT

## 2020-01-01 PROCEDURE — 700111 HCHG RX REV CODE 636 W/ 250 OVERRIDE (IP)

## 2020-01-01 PROCEDURE — 160027 HCHG SURGERY MINUTES - 1ST 30 MINS LEVEL 2: Performed by: ORTHOPAEDIC SURGERY

## 2020-01-01 PROCEDURE — 83550 IRON BINDING TEST: CPT

## 2020-01-01 PROCEDURE — 83540 ASSAY OF IRON: CPT

## 2020-01-01 PROCEDURE — 0Y6M0Z9 DETACHMENT AT RIGHT FOOT, PARTIAL 1ST RAY, OPEN APPROACH: ICD-10-PCS | Performed by: ORTHOPAEDIC SURGERY

## 2020-01-01 PROCEDURE — 700105 HCHG RX REV CODE 258: Performed by: STUDENT IN AN ORGANIZED HEALTH CARE EDUCATION/TRAINING PROGRAM

## 2020-01-01 PROCEDURE — P9016 RBC LEUKOCYTES REDUCED: HCPCS

## 2020-01-01 PROCEDURE — 96367 TX/PROPH/DG ADDL SEQ IV INF: CPT

## 2020-01-01 PROCEDURE — 95816 EEG AWAKE AND DROWSY: CPT | Mod: 26 | Performed by: PSYCHIATRY & NEUROLOGY

## 2020-01-01 PROCEDURE — 99284 EMERGENCY DEPT VISIT MOD MDM: CPT

## 2020-01-01 PROCEDURE — 160028 HCHG SURGERY MINUTES - 1ST 30 MINS LEVEL 3: Performed by: ORTHOPAEDIC SURGERY

## 2020-01-01 PROCEDURE — 84425 ASSAY OF VITAMIN B-1: CPT

## 2020-01-01 PROCEDURE — 160048 HCHG OR STATISTICAL LEVEL 1-5: Performed by: ORTHOPAEDIC SURGERY

## 2020-01-01 PROCEDURE — 700111 HCHG RX REV CODE 636 W/ 250 OVERRIDE (IP): Performed by: EMERGENCY MEDICINE

## 2020-01-01 PROCEDURE — 99214 OFFICE O/P EST MOD 30 MIN: CPT

## 2020-01-01 PROCEDURE — 5A1D70Z PERFORMANCE OF URINARY FILTRATION, INTERMITTENT, LESS THAN 6 HOURS PER DAY: ICD-10-PCS | Performed by: INTERNAL MEDICINE

## 2020-01-01 PROCEDURE — 4A00X4Z MEASUREMENT OF CENTRAL NERVOUS ELECTRICAL ACTIVITY, EXTERNAL APPROACH: ICD-10-PCS | Performed by: PSYCHIATRY & NEUROLOGY

## 2020-01-01 PROCEDURE — 700105 HCHG RX REV CODE 258: Performed by: EMERGENCY MEDICINE

## 2020-01-01 PROCEDURE — 85730 THROMBOPLASTIN TIME PARTIAL: CPT

## 2020-01-01 PROCEDURE — A9270 NON-COVERED ITEM OR SERVICE: HCPCS | Performed by: ORTHOPAEDIC SURGERY

## 2020-01-01 PROCEDURE — 83605 ASSAY OF LACTIC ACID: CPT

## 2020-01-01 PROCEDURE — 82962 GLUCOSE BLOOD TEST: CPT | Mod: 91

## 2020-01-01 PROCEDURE — 0Y6H0Z1 DETACHMENT AT RIGHT LOWER LEG, HIGH, OPEN APPROACH: ICD-10-PCS | Performed by: ORTHOPAEDIC SURGERY

## 2020-01-01 PROCEDURE — 36430 TRANSFUSION BLD/BLD COMPNT: CPT

## 2020-01-01 PROCEDURE — 86850 RBC ANTIBODY SCREEN: CPT

## 2020-01-01 PROCEDURE — 501838 HCHG SUTURE GENERAL: Performed by: ORTHOPAEDIC SURGERY

## 2020-01-01 PROCEDURE — 84132 ASSAY OF SERUM POTASSIUM: CPT

## 2020-01-01 PROCEDURE — 85018 HEMOGLOBIN: CPT

## 2020-01-01 PROCEDURE — 11055 PARING/CUTG B9 HYPRKER LES 1: CPT

## 2020-01-01 PROCEDURE — 82272 OCCULT BLD FECES 1-3 TESTS: CPT

## 2020-01-01 PROCEDURE — 0241U HCHG SARS-COV-2 COVID-19 NFCT DS RESP RNA 4 TRGT MIC: CPT

## 2020-01-01 PROCEDURE — C9803 HOPD COVID-19 SPEC COLLECT: HCPCS | Performed by: EMERGENCY MEDICINE

## 2020-01-01 PROCEDURE — 70496 CT ANGIOGRAPHY HEAD: CPT

## 2020-01-01 PROCEDURE — 96366 THER/PROPH/DIAG IV INF ADDON: CPT

## 2020-01-01 PROCEDURE — 85014 HEMATOCRIT: CPT

## 2020-01-01 PROCEDURE — 30233N1 TRANSFUSION OF NONAUTOLOGOUS RED BLOOD CELLS INTO PERIPHERAL VEIN, PERCUTANEOUS APPROACH: ICD-10-PCS | Performed by: INTERNAL MEDICINE

## 2020-01-01 PROCEDURE — 86923 COMPATIBILITY TEST ELECTRIC: CPT

## 2020-01-01 PROCEDURE — 700117 HCHG RX CONTRAST REV CODE 255: Performed by: PSYCHIATRY & NEUROLOGY

## 2020-01-01 PROCEDURE — 83605 ASSAY OF LACTIC ACID: CPT | Mod: 91

## 2020-01-01 PROCEDURE — 93005 ELECTROCARDIOGRAM TRACING: CPT | Performed by: STUDENT IN AN ORGANIZED HEALTH CARE EDUCATION/TRAINING PROGRAM

## 2020-01-01 PROCEDURE — 93005 ELECTROCARDIOGRAM TRACING: CPT

## 2020-01-01 PROCEDURE — 85652 RBC SED RATE AUTOMATED: CPT

## 2020-01-01 PROCEDURE — 71045 X-RAY EXAM CHEST 1 VIEW: CPT

## 2020-01-01 PROCEDURE — 99233 SBSQ HOSP IP/OBS HIGH 50: CPT | Mod: GC | Performed by: INTERNAL MEDICINE

## 2020-01-01 PROCEDURE — 700102 HCHG RX REV CODE 250 W/ 637 OVERRIDE(OP): Performed by: EMERGENCY MEDICINE

## 2020-01-01 PROCEDURE — 700105 HCHG RX REV CODE 258: Performed by: ANESTHESIOLOGY

## 2020-01-01 PROCEDURE — 96365 THER/PROPH/DIAG IV INF INIT: CPT

## 2020-01-01 PROCEDURE — 93306 TTE W/DOPPLER COMPLETE: CPT | Mod: 26 | Performed by: INTERNAL MEDICINE

## 2020-01-01 PROCEDURE — 700111 HCHG RX REV CODE 636 W/ 250 OVERRIDE (IP): Performed by: STUDENT IN AN ORGANIZED HEALTH CARE EDUCATION/TRAINING PROGRAM

## 2020-01-01 PROCEDURE — 99223 1ST HOSP IP/OBS HIGH 75: CPT | Mod: AI | Performed by: HOSPITALIST

## 2020-01-01 PROCEDURE — 160002 HCHG RECOVERY MINUTES (STAT): Performed by: ORTHOPAEDIC SURGERY

## 2020-01-01 PROCEDURE — 81001 URINALYSIS AUTO W/SCOPE: CPT

## 2020-01-01 PROCEDURE — 86780 TREPONEMA PALLIDUM: CPT

## 2020-01-01 PROCEDURE — 85610 PROTHROMBIN TIME: CPT

## 2020-01-01 PROCEDURE — A9270 NON-COVERED ITEM OR SERVICE: HCPCS | Performed by: ANESTHESIOLOGY

## 2020-01-01 PROCEDURE — 70450 CT HEAD/BRAIN W/O DYE: CPT

## 2020-01-01 PROCEDURE — 70498 CT ANGIOGRAPHY NECK: CPT

## 2020-01-01 PROCEDURE — 84484 ASSAY OF TROPONIN QUANT: CPT

## 2020-01-01 PROCEDURE — 99232 SBSQ HOSP IP/OBS MODERATE 35: CPT | Mod: GC | Performed by: INTERNAL MEDICINE

## 2020-01-01 PROCEDURE — 95816 EEG AWAKE AND DROWSY: CPT | Performed by: PSYCHIATRY & NEUROLOGY

## 2020-01-01 PROCEDURE — 503339 HCHG DRESSSING PICO: Performed by: ORTHOPAEDIC SURGERY

## 2020-01-01 PROCEDURE — 700101 HCHG RX REV CODE 250: Performed by: ANESTHESIOLOGY

## 2020-01-01 PROCEDURE — 87040 BLOOD CULTURE FOR BACTERIA: CPT

## 2020-01-01 PROCEDURE — 160038 HCHG SURGERY MINUTES - EA ADDL 1 MIN LEVEL 2: Performed by: ORTHOPAEDIC SURGERY

## 2020-01-01 PROCEDURE — 87389 HIV-1 AG W/HIV-1&-2 AB AG IA: CPT

## 2020-01-01 PROCEDURE — 700111 HCHG RX REV CODE 636 W/ 250 OVERRIDE (IP): Performed by: HOSPITALIST

## 2020-01-01 PROCEDURE — 86900 BLOOD TYPING SEROLOGIC ABO: CPT

## 2020-01-01 PROCEDURE — 99214 OFFICE O/P EST MOD 30 MIN: CPT | Performed by: INTERNAL MEDICINE

## 2020-01-01 PROCEDURE — 700102 HCHG RX REV CODE 250 W/ 637 OVERRIDE(OP): Performed by: ANESTHESIOLOGY

## 2020-01-01 PROCEDURE — 85027 COMPLETE CBC AUTOMATED: CPT

## 2020-01-01 PROCEDURE — 88307 TISSUE EXAM BY PATHOLOGIST: CPT

## 2020-01-01 PROCEDURE — 82140 ASSAY OF AMMONIA: CPT

## 2020-01-01 PROCEDURE — U0003 INFECTIOUS AGENT DETECTION BY NUCLEIC ACID (DNA OR RNA); SEVERE ACUTE RESPIRATORY SYNDROME CORONAVIRUS 2 (SARS-COV-2) (CORONAVIRUS DISEASE [COVID-19]), AMPLIFIED PROBE TECHNIQUE, MAKING USE OF HIGH THROUGHPUT TECHNOLOGIES AS DESCRIBED BY CMS-2020-01-R: HCPCS

## 2020-01-01 PROCEDURE — 93010 ELECTROCARDIOGRAM REPORT: CPT | Performed by: INTERNAL MEDICINE

## 2020-01-01 PROCEDURE — 700111 HCHG RX REV CODE 636 W/ 250 OVERRIDE (IP): Mod: TB

## 2020-01-01 PROCEDURE — 82550 ASSAY OF CK (CPK): CPT

## 2020-01-01 PROCEDURE — 700105 HCHG RX REV CODE 258: Performed by: HOSPITALIST

## 2020-01-01 PROCEDURE — C9803 HOPD COVID-19 SPEC COLLECT: HCPCS

## 2020-01-01 PROCEDURE — 93306 TTE W/DOPPLER COMPLETE: CPT

## 2020-01-01 PROCEDURE — 71046 X-RAY EXAM CHEST 2 VIEWS: CPT

## 2020-01-01 PROCEDURE — 160039 HCHG SURGERY MINUTES - EA ADDL 1 MIN LEVEL 3: Performed by: ORTHOPAEDIC SURGERY

## 2020-01-01 PROCEDURE — 82728 ASSAY OF FERRITIN: CPT

## 2020-01-01 PROCEDURE — A6223 GAUZE >16<=48 NO W/SAL W/O B: HCPCS | Performed by: ORTHOPAEDIC SURGERY

## 2020-01-01 PROCEDURE — 82607 VITAMIN B-12: CPT

## 2020-01-01 PROCEDURE — 83036 HEMOGLOBIN GLYCOSYLATED A1C: CPT

## 2020-01-01 PROCEDURE — 96368 THER/DIAG CONCURRENT INF: CPT

## 2020-01-01 PROCEDURE — 64447 NJX AA&/STRD FEMORAL NRV IMG: CPT | Performed by: ORTHOPAEDIC SURGERY

## 2020-01-01 PROCEDURE — 99211 OFF/OP EST MAY X REQ PHY/QHP: CPT | Mod: 25

## 2020-01-01 PROCEDURE — 500367 HCHG DRAIN KIT, HEMOVAC: Performed by: ORTHOPAEDIC SURGERY

## 2020-01-01 PROCEDURE — 160036 HCHG PACU - EA ADDL 30 MINS PHASE I: Performed by: ORTHOPAEDIC SURGERY

## 2020-01-01 RX ORDER — PHENYLEPHRINE HCL IN 0.9% NACL 0.5 MG/5ML
SYRINGE (ML) INTRAVENOUS PRN
Status: DISCONTINUED | OUTPATIENT
Start: 2020-01-01 | End: 2020-01-01 | Stop reason: SURG

## 2020-01-01 RX ORDER — VANCOMYCIN HYDROCHLORIDE 1 G/20ML
INJECTION, POWDER, LYOPHILIZED, FOR SOLUTION INTRAVENOUS
Status: COMPLETED | OUTPATIENT
Start: 2020-01-01 | End: 2020-01-01

## 2020-01-01 RX ORDER — METHIMAZOLE 5 MG/1
7.5 TABLET ORAL EVERY MORNING
Status: DISCONTINUED | OUTPATIENT
Start: 2021-01-01 | End: 2021-01-01

## 2020-01-01 RX ORDER — AMOXICILLIN AND CLAVULANATE POTASSIUM 500; 125 MG/1; MG/1
1 TABLET, FILM COATED ORAL DAILY
Qty: 10 TAB | Refills: 0 | Status: SHIPPED | OUTPATIENT
Start: 2020-01-01 | End: 2020-01-01

## 2020-01-01 RX ORDER — POLYETHYLENE GLYCOL 3350 17 G/17G
1 POWDER, FOR SOLUTION ORAL
Status: DISCONTINUED | OUTPATIENT
Start: 2020-01-01 | End: 2020-01-01 | Stop reason: HOSPADM

## 2020-01-01 RX ORDER — CLOPIDOGREL BISULFATE 75 MG/1
75 TABLET ORAL DAILY
Status: DISCONTINUED | OUTPATIENT
Start: 2020-01-01 | End: 2020-01-01 | Stop reason: HOSPADM

## 2020-01-01 RX ORDER — CARVEDILOL 3.12 MG/1
3.12 TABLET ORAL 2 TIMES DAILY WITH MEALS
Status: DISCONTINUED | OUTPATIENT
Start: 2020-01-01 | End: 2020-01-01 | Stop reason: HOSPADM

## 2020-01-01 RX ORDER — ASPIRIN 81 MG/1
81 TABLET, CHEWABLE ORAL DAILY
Status: CANCELLED | OUTPATIENT
Start: 2020-01-01

## 2020-01-01 RX ORDER — CEFAZOLIN SODIUM 1 G/50ML
1 INJECTION, SOLUTION INTRAVENOUS EVERY 24 HOURS
Status: COMPLETED | OUTPATIENT
Start: 2021-01-01 | End: 2021-01-01

## 2020-01-01 RX ORDER — PROCHLORPERAZINE MALEATE 10 MG
5 TABLET ORAL EVERY 6 HOURS PRN
Status: DISCONTINUED | OUTPATIENT
Start: 2020-01-01 | End: 2020-01-01 | Stop reason: HOSPADM

## 2020-01-01 RX ORDER — MEPERIDINE HYDROCHLORIDE 25 MG/ML
12.5 INJECTION INTRAMUSCULAR; INTRAVENOUS; SUBCUTANEOUS
Status: DISCONTINUED | OUTPATIENT
Start: 2020-01-01 | End: 2020-01-01 | Stop reason: HOSPADM

## 2020-01-01 RX ORDER — CEPHALEXIN 500 MG/1
500 CAPSULE ORAL DAILY
Qty: 14 CAP | Refills: 0 | Status: SHIPPED | OUTPATIENT
Start: 2020-01-01 | End: 2020-01-01

## 2020-01-01 RX ORDER — FUROSEMIDE 20 MG/1
20 TABLET ORAL DAILY
Qty: 3 TAB | Refills: 0 | Status: SHIPPED | OUTPATIENT
Start: 2020-01-01 | End: 2020-01-01

## 2020-01-01 RX ORDER — ASPIRIN 81 MG/1
81 TABLET, CHEWABLE ORAL DAILY
Status: DISCONTINUED | OUTPATIENT
Start: 2020-01-01 | End: 2020-01-01 | Stop reason: HOSPADM

## 2020-01-01 RX ORDER — HYDROMORPHONE HYDROCHLORIDE 1 MG/ML
0.5 INJECTION, SOLUTION INTRAMUSCULAR; INTRAVENOUS; SUBCUTANEOUS ONCE
Status: COMPLETED | OUTPATIENT
Start: 2020-01-01 | End: 2020-01-01

## 2020-01-01 RX ORDER — CLOPIDOGREL BISULFATE 75 MG/1
75 TABLET ORAL DAILY
Qty: 30 TAB | Refills: 0 | Status: SHIPPED | OUTPATIENT
Start: 2020-01-01 | End: 2020-01-01

## 2020-01-01 RX ORDER — DIPHENHYDRAMINE HYDROCHLORIDE 50 MG/ML
12.5 INJECTION INTRAMUSCULAR; INTRAVENOUS
Status: DISCONTINUED | OUTPATIENT
Start: 2020-01-01 | End: 2020-01-01 | Stop reason: HOSPADM

## 2020-01-01 RX ORDER — HEPARIN SODIUM 1000 [USP'U]/ML
INJECTION, SOLUTION INTRAVENOUS; SUBCUTANEOUS
Status: COMPLETED
Start: 2020-01-01 | End: 2020-01-01

## 2020-01-01 RX ORDER — CALCIUM ACETATE 667 MG/1
TABLET ORAL
COMMUNITY
Start: 2020-01-01 | End: 2020-01-01

## 2020-01-01 RX ORDER — BUPIVACAINE HYDROCHLORIDE 5 MG/ML
INJECTION, SOLUTION EPIDURAL; INTRACAUDAL
Status: DISCONTINUED | OUTPATIENT
Start: 2020-01-01 | End: 2020-01-01 | Stop reason: HOSPADM

## 2020-01-01 RX ORDER — DEXTROSE MONOHYDRATE 25 G/50ML
50 INJECTION, SOLUTION INTRAVENOUS
Status: DISCONTINUED | OUTPATIENT
Start: 2020-01-01 | End: 2020-01-01 | Stop reason: HOSPADM

## 2020-01-01 RX ORDER — CLOPIDOGREL BISULFATE 75 MG/1
TABLET ORAL
Qty: 90 TAB | Refills: 3 | Status: SHIPPED | OUTPATIENT
Start: 2020-01-01 | End: 2020-01-01

## 2020-01-01 RX ORDER — CALCITRIOL 0.25 UG/1
0.25 CAPSULE, LIQUID FILLED ORAL DAILY
Status: DISCONTINUED | OUTPATIENT
Start: 2020-01-01 | End: 2021-01-01

## 2020-01-01 RX ORDER — METHIMAZOLE 5 MG/1
5 TABLET ORAL EVERY EVENING
Qty: 90 TAB | Refills: 0 | Status: SHIPPED | OUTPATIENT
Start: 2020-01-01 | End: 2020-01-01

## 2020-01-01 RX ORDER — INSULIN GLARGINE 100 [IU]/ML
5 INJECTION, SOLUTION SUBCUTANEOUS
Status: DISCONTINUED | OUTPATIENT
Start: 2021-01-01 | End: 2021-01-01

## 2020-01-01 RX ORDER — OMEPRAZOLE 20 MG/1
20 CAPSULE, DELAYED RELEASE ORAL DAILY
Status: DISCONTINUED | OUTPATIENT
Start: 2021-01-01 | End: 2021-01-01

## 2020-01-01 RX ORDER — CEPHALEXIN 500 MG/1
500 CAPSULE ORAL DAILY
Qty: 4 CAP | Refills: 0 | Status: SHIPPED | OUTPATIENT
Start: 2020-01-01 | End: 2020-01-01

## 2020-01-01 RX ORDER — MEPERIDINE HYDROCHLORIDE 25 MG/ML
6.25 INJECTION INTRAMUSCULAR; INTRAVENOUS; SUBCUTANEOUS
Status: DISCONTINUED | OUTPATIENT
Start: 2020-01-01 | End: 2020-01-01 | Stop reason: HOSPADM

## 2020-01-01 RX ORDER — HEPARIN SODIUM 1000 [USP'U]/ML
3700 INJECTION, SOLUTION INTRAVENOUS; SUBCUTANEOUS
Status: DISCONTINUED | OUTPATIENT
Start: 2020-01-01 | End: 2020-01-01 | Stop reason: HOSPADM

## 2020-01-01 RX ORDER — ONDANSETRON 4 MG/1
1 TABLET, FILM COATED ORAL
COMMUNITY
Start: 2020-05-21 | End: 2020-01-01

## 2020-01-01 RX ORDER — ATORVASTATIN CALCIUM 40 MG/1
40 TABLET, FILM COATED ORAL EVERY EVENING
Status: DISCONTINUED | OUTPATIENT
Start: 2020-01-01 | End: 2021-01-01

## 2020-01-01 RX ORDER — CARVEDILOL 6.25 MG/1
3.12 TABLET ORAL 2 TIMES DAILY WITH MEALS
Status: DISCONTINUED | OUTPATIENT
Start: 2021-01-01 | End: 2021-01-01

## 2020-01-01 RX ORDER — METHIMAZOLE 5 MG/1
TABLET ORAL
Qty: 225 TAB | Refills: 0 | Status: ON HOLD | OUTPATIENT
Start: 2020-01-01 | End: 2021-01-01

## 2020-01-01 RX ORDER — OXYCODONE HCL 5 MG/5 ML
5 SOLUTION, ORAL ORAL
Status: COMPLETED | OUTPATIENT
Start: 2020-01-01 | End: 2020-01-01

## 2020-01-01 RX ORDER — ASPIRIN 81 MG/1
81 TABLET, CHEWABLE ORAL DAILY
Qty: 100 TAB | Refills: 0 | Status: SHIPPED | OUTPATIENT
Start: 2020-01-01 | End: 2020-01-01

## 2020-01-01 RX ORDER — ACETAMINOPHEN 325 MG/1
650 TABLET ORAL EVERY 6 HOURS PRN
Status: DISCONTINUED | OUTPATIENT
Start: 2020-01-01 | End: 2020-01-01 | Stop reason: HOSPADM

## 2020-01-01 RX ORDER — HALOPERIDOL 5 MG/ML
1 INJECTION INTRAMUSCULAR
Status: DISCONTINUED | OUTPATIENT
Start: 2020-01-01 | End: 2020-01-01 | Stop reason: HOSPADM

## 2020-01-01 RX ORDER — CEPHALEXIN 500 MG/1
500 CAPSULE ORAL DAILY
Status: DISCONTINUED | OUTPATIENT
Start: 2020-01-01 | End: 2020-01-01

## 2020-01-01 RX ORDER — CEPHALEXIN 500 MG/1
500 CAPSULE ORAL DAILY
Status: DISCONTINUED | OUTPATIENT
Start: 2020-01-01 | End: 2020-01-01 | Stop reason: HOSPADM

## 2020-01-01 RX ORDER — SODIUM CHLORIDE, SODIUM LACTATE, POTASSIUM CHLORIDE, CALCIUM CHLORIDE 600; 310; 30; 20 MG/100ML; MG/100ML; MG/100ML; MG/100ML
INJECTION, SOLUTION INTRAVENOUS CONTINUOUS
Status: DISCONTINUED | OUTPATIENT
Start: 2020-01-01 | End: 2020-01-01 | Stop reason: HOSPADM

## 2020-01-01 RX ORDER — METOPROLOL TARTRATE 1 MG/ML
1 INJECTION, SOLUTION INTRAVENOUS
Status: DISCONTINUED | OUTPATIENT
Start: 2020-01-01 | End: 2020-01-01 | Stop reason: HOSPADM

## 2020-01-01 RX ORDER — ROPIVACAINE HYDROCHLORIDE 5 MG/ML
INJECTION, SOLUTION EPIDURAL; INFILTRATION; PERINEURAL
Status: COMPLETED | OUTPATIENT
Start: 2020-01-01 | End: 2020-01-01

## 2020-01-01 RX ORDER — METHIMAZOLE 5 MG/1
5 TABLET ORAL 2 TIMES DAILY
Status: DISCONTINUED | OUTPATIENT
Start: 2020-01-01 | End: 2020-01-01

## 2020-01-01 RX ORDER — ONDANSETRON 2 MG/ML
INJECTION INTRAMUSCULAR; INTRAVENOUS PRN
Status: DISCONTINUED | OUTPATIENT
Start: 2020-01-01 | End: 2020-01-01 | Stop reason: SURG

## 2020-01-01 RX ORDER — OXYCODONE HYDROCHLORIDE 5 MG/1
TABLET ORAL
COMMUNITY
Start: 2020-01-01 | End: 2020-01-01

## 2020-01-01 RX ORDER — IBUPROFEN 200 MG
200 TABLET ORAL EVERY 6 HOURS PRN
Status: ON HOLD | COMMUNITY
End: 2021-01-01

## 2020-01-01 RX ORDER — CEFAZOLIN SODIUM 1 G/3ML
INJECTION, POWDER, FOR SOLUTION INTRAMUSCULAR; INTRAVENOUS PRN
Status: DISCONTINUED | OUTPATIENT
Start: 2020-01-01 | End: 2020-01-01 | Stop reason: SURG

## 2020-01-01 RX ORDER — OXYCODONE HYDROCHLORIDE 10 MG/1
10 TABLET ORAL EVERY 4 HOURS PRN
Status: DISCONTINUED | OUTPATIENT
Start: 2020-01-01 | End: 2020-01-01

## 2020-01-01 RX ORDER — OXYCODONE HYDROCHLORIDE 5 MG/1
5 TABLET ORAL EVERY 4 HOURS PRN
Status: DISCONTINUED | OUTPATIENT
Start: 2020-01-01 | End: 2020-01-01 | Stop reason: HOSPADM

## 2020-01-01 RX ORDER — HYDROMORPHONE HYDROCHLORIDE 1 MG/ML
0.2 INJECTION, SOLUTION INTRAMUSCULAR; INTRAVENOUS; SUBCUTANEOUS
Status: DISCONTINUED | OUTPATIENT
Start: 2020-01-01 | End: 2020-01-01 | Stop reason: HOSPADM

## 2020-01-01 RX ORDER — SODIUM CHLORIDE 9 MG/ML
500 INJECTION, SOLUTION INTRAVENOUS ONCE
Status: DISCONTINUED | OUTPATIENT
Start: 2020-01-01 | End: 2020-01-01 | Stop reason: HOSPADM

## 2020-01-01 RX ORDER — HYDROMORPHONE HYDROCHLORIDE 1 MG/ML
0.6 INJECTION, SOLUTION INTRAMUSCULAR; INTRAVENOUS; SUBCUTANEOUS
Status: DISCONTINUED | OUTPATIENT
Start: 2020-01-01 | End: 2020-01-01 | Stop reason: HOSPADM

## 2020-01-01 RX ORDER — OXYCODONE HCL 5 MG/5 ML
10 SOLUTION, ORAL ORAL
Status: DISCONTINUED | OUTPATIENT
Start: 2020-01-01 | End: 2020-01-01 | Stop reason: HOSPADM

## 2020-01-01 RX ORDER — METHIMAZOLE 5 MG/1
5 TABLET ORAL EVERY EVENING
Status: DISCONTINUED | OUTPATIENT
Start: 2020-01-01 | End: 2020-01-01 | Stop reason: HOSPADM

## 2020-01-01 RX ORDER — HEPARIN SODIUM 5000 [USP'U]/ML
5000 INJECTION, SOLUTION INTRAVENOUS; SUBCUTANEOUS EVERY 8 HOURS
Status: CANCELLED | OUTPATIENT
Start: 2020-01-01

## 2020-01-01 RX ORDER — MORPHINE SULFATE 4 MG/ML
4 INJECTION, SOLUTION INTRAMUSCULAR; INTRAVENOUS EVERY 6 HOURS PRN
Status: DISCONTINUED | OUTPATIENT
Start: 2020-01-01 | End: 2020-01-01

## 2020-01-01 RX ORDER — HYDROMORPHONE HYDROCHLORIDE 1 MG/ML
0.4 INJECTION, SOLUTION INTRAMUSCULAR; INTRAVENOUS; SUBCUTANEOUS
Status: DISCONTINUED | OUTPATIENT
Start: 2020-01-01 | End: 2020-01-01 | Stop reason: HOSPADM

## 2020-01-01 RX ORDER — ONDANSETRON 4 MG/1
4 TABLET, ORALLY DISINTEGRATING ORAL EVERY 4 HOURS PRN
Status: DISCONTINUED | OUTPATIENT
Start: 2020-01-01 | End: 2021-01-01

## 2020-01-01 RX ORDER — SODIUM CHLORIDE 9 MG/ML
INJECTION, SOLUTION INTRAVENOUS
Status: DISCONTINUED | OUTPATIENT
Start: 2020-01-01 | End: 2020-01-01 | Stop reason: SURG

## 2020-01-01 RX ORDER — OXYCODONE HYDROCHLORIDE 5 MG/1
5 TABLET ORAL EVERY 4 HOURS PRN
Status: DISCONTINUED | OUTPATIENT
Start: 2020-01-01 | End: 2020-01-01

## 2020-01-01 RX ORDER — MORPHINE SULFATE 4 MG/ML
4 INJECTION, SOLUTION INTRAMUSCULAR; INTRAVENOUS
Status: DISCONTINUED | OUTPATIENT
Start: 2020-01-01 | End: 2020-01-01

## 2020-01-01 RX ORDER — PANTOPRAZOLE SODIUM 40 MG/1
40 TABLET, DELAYED RELEASE ORAL
Status: DISCONTINUED | OUTPATIENT
Start: 2020-01-01 | End: 2020-01-01

## 2020-01-01 RX ORDER — METHIMAZOLE 5 MG/1
7.5 TABLET ORAL EVERY MORNING
Qty: 90 TAB | Refills: 0 | Status: SHIPPED | OUTPATIENT
Start: 2020-01-01 | End: 2020-01-01

## 2020-01-01 RX ORDER — AMOXICILLIN 250 MG
2 CAPSULE ORAL 2 TIMES DAILY
Status: DISCONTINUED | OUTPATIENT
Start: 2020-01-01 | End: 2020-01-01 | Stop reason: HOSPADM

## 2020-01-01 RX ORDER — GUAIFENESIN 600 MG/1
600 TABLET, EXTENDED RELEASE ORAL EVERY 12 HOURS
Qty: 28 TAB | Refills: 0 | Status: ON HOLD | OUTPATIENT
Start: 2020-01-01 | End: 2021-01-01

## 2020-01-01 RX ORDER — CARVEDILOL 3.12 MG/1
3.12 TABLET ORAL 2 TIMES DAILY WITH MEALS
Qty: 60 TAB | Refills: 0 | Status: SHIPPED | OUTPATIENT
Start: 2020-01-01 | End: 2020-01-01

## 2020-01-01 RX ORDER — OMEPRAZOLE 20 MG/1
20 CAPSULE, DELAYED RELEASE ORAL DAILY
Status: DISCONTINUED | OUTPATIENT
Start: 2020-01-01 | End: 2020-01-01 | Stop reason: HOSPADM

## 2020-01-01 RX ORDER — DEXTROSE MONOHYDRATE 25 G/50ML
50 INJECTION, SOLUTION INTRAVENOUS
Status: DISCONTINUED | OUTPATIENT
Start: 2020-01-01 | End: 2020-01-01

## 2020-01-01 RX ORDER — BISACODYL 10 MG
10 SUPPOSITORY, RECTAL RECTAL
Status: DISCONTINUED | OUTPATIENT
Start: 2020-01-01 | End: 2020-01-01 | Stop reason: HOSPADM

## 2020-01-01 RX ORDER — CALCITRIOL 0.25 UG/1
0.25 CAPSULE, LIQUID FILLED ORAL DAILY
Qty: 30 CAP | Refills: 0 | Status: SHIPPED | OUTPATIENT
Start: 2020-01-01 | End: 2020-01-01

## 2020-01-01 RX ORDER — ONDANSETRON 2 MG/ML
4 INJECTION INTRAMUSCULAR; INTRAVENOUS EVERY 4 HOURS PRN
Status: DISCONTINUED | OUTPATIENT
Start: 2020-01-01 | End: 2021-01-01 | Stop reason: HOSPADM

## 2020-01-01 RX ORDER — OXYCODONE HCL 5 MG/5 ML
10 SOLUTION, ORAL ORAL
Status: COMPLETED | OUTPATIENT
Start: 2020-01-01 | End: 2020-01-01

## 2020-01-01 RX ORDER — ONDANSETRON 2 MG/ML
4 INJECTION INTRAMUSCULAR; INTRAVENOUS EVERY 4 HOURS PRN
Status: DISCONTINUED | OUTPATIENT
Start: 2020-01-01 | End: 2020-01-01

## 2020-01-01 RX ORDER — CALCIUM ACETATE 667 MG/1
667 TABLET ORAL
Qty: 180 TAB | Refills: 0 | Status: SHIPPED | OUTPATIENT
Start: 2020-01-01 | End: 2020-01-01

## 2020-01-01 RX ORDER — IPRATROPIUM BROMIDE 17 UG/1
2 AEROSOL, METERED RESPIRATORY (INHALATION) EVERY 6 HOURS
Qty: 17 G | Refills: 1 | Status: SHIPPED | OUTPATIENT
Start: 2020-01-01 | End: 2020-01-01

## 2020-01-01 RX ORDER — HEPARIN SODIUM 5000 [USP'U]/ML
5000 INJECTION, SOLUTION INTRAVENOUS; SUBCUTANEOUS EVERY 8 HOURS
Status: DISCONTINUED | OUTPATIENT
Start: 2020-01-01 | End: 2020-01-01

## 2020-01-01 RX ORDER — CALCITRIOL 0.25 UG/1
0.25 CAPSULE, LIQUID FILLED ORAL DAILY
Status: DISCONTINUED | OUTPATIENT
Start: 2020-01-01 | End: 2020-01-01 | Stop reason: HOSPADM

## 2020-01-01 RX ORDER — ACETAMINOPHEN 500 MG
1000 TABLET ORAL ONCE
Status: COMPLETED | OUTPATIENT
Start: 2020-01-01 | End: 2020-01-01

## 2020-01-01 RX ORDER — ACETAMINOPHEN 325 MG/1
650 TABLET ORAL EVERY 6 HOURS PRN
Status: DISCONTINUED | OUTPATIENT
Start: 2020-01-01 | End: 2021-01-01

## 2020-01-01 RX ORDER — ONDANSETRON 2 MG/ML
4 INJECTION INTRAMUSCULAR; INTRAVENOUS
Status: DISCONTINUED | OUTPATIENT
Start: 2020-01-01 | End: 2020-01-01 | Stop reason: HOSPADM

## 2020-01-01 RX ORDER — OXYCODONE HYDROCHLORIDE 5 MG/1
2.5-5 TABLET ORAL EVERY 4 HOURS PRN
Status: DISCONTINUED | OUTPATIENT
Start: 2020-01-01 | End: 2021-01-01

## 2020-01-01 RX ORDER — OXYCODONE HCL 5 MG/5 ML
5 SOLUTION, ORAL ORAL
Status: DISCONTINUED | OUTPATIENT
Start: 2020-01-01 | End: 2020-01-01 | Stop reason: HOSPADM

## 2020-01-01 RX ORDER — GUAIFENESIN 600 MG/1
600 TABLET, EXTENDED RELEASE ORAL EVERY 12 HOURS
Status: DISCONTINUED | OUTPATIENT
Start: 2020-01-01 | End: 2021-01-01

## 2020-01-01 RX ORDER — ATORVASTATIN CALCIUM 40 MG/1
40 TABLET, FILM COATED ORAL
Status: DISCONTINUED | OUTPATIENT
Start: 2020-01-01 | End: 2020-01-01 | Stop reason: HOSPADM

## 2020-01-01 RX ORDER — LIDOCAINE HYDROCHLORIDE 10 MG/ML
INJECTION, SOLUTION INFILTRATION; PERINEURAL
Status: DISCONTINUED | OUTPATIENT
Start: 2020-01-01 | End: 2020-01-01 | Stop reason: HOSPADM

## 2020-01-01 RX ORDER — METHIMAZOLE 5 MG/1
5 TABLET ORAL EVERY EVENING
Status: DISCONTINUED | OUTPATIENT
Start: 2020-01-01 | End: 2021-01-01

## 2020-01-01 RX ORDER — ASPIRIN 81 MG/1
324 TABLET, CHEWABLE ORAL ONCE
Status: COMPLETED | OUTPATIENT
Start: 2020-01-01 | End: 2020-01-01

## 2020-01-01 RX ORDER — ACETAMINOPHEN 325 MG/1
650 TABLET ORAL EVERY 4 HOURS PRN
COMMUNITY
End: 2021-01-01

## 2020-01-01 RX ORDER — SODIUM CHLORIDE 9 MG/ML
30 INJECTION, SOLUTION INTRAVENOUS ONCE
Status: COMPLETED | OUTPATIENT
Start: 2020-01-01 | End: 2020-01-01

## 2020-01-01 RX ORDER — METHIMAZOLE 5 MG/1
7.5 TABLET ORAL EVERY MORNING
Status: DISCONTINUED | OUTPATIENT
Start: 2020-01-01 | End: 2020-01-01 | Stop reason: HOSPADM

## 2020-01-01 RX ORDER — SODIUM CHLORIDE 9 MG/ML
INJECTION, SOLUTION INTRAVENOUS ONCE
Status: COMPLETED | OUTPATIENT
Start: 2020-01-01 | End: 2020-01-01

## 2020-01-01 RX ORDER — CALCIUM ACETATE 667 MG/1
1334 TABLET ORAL
Status: DISCONTINUED | OUTPATIENT
Start: 2021-01-01 | End: 2021-01-01

## 2020-01-01 RX ORDER — CALCIUM ACETATE 667 MG/1
667 TABLET ORAL
Status: DISCONTINUED | OUTPATIENT
Start: 2020-01-01 | End: 2020-01-01 | Stop reason: HOSPADM

## 2020-01-01 RX ORDER — DEXTROSE MONOHYDRATE 25 G/50ML
50 INJECTION, SOLUTION INTRAVENOUS
Status: DISCONTINUED | OUTPATIENT
Start: 2020-01-01 | End: 2021-01-01

## 2020-01-01 RX ADMIN — PROCHLORPERAZINE MALEATE 5 MG: 5 TABLET ORAL at 08:06

## 2020-01-01 RX ADMIN — FENTANYL CITRATE 50 MCG: 50 INJECTION, SOLUTION INTRAMUSCULAR; INTRAVENOUS at 16:52

## 2020-01-01 RX ADMIN — SODIUM CHLORIDE 1000 ML: 9 INJECTION, SOLUTION INTRAVENOUS at 22:44

## 2020-01-01 RX ADMIN — MORPHINE SULFATE 4 MG: 4 INJECTION INTRAVENOUS at 05:11

## 2020-01-01 RX ADMIN — CALCITRIOL CAPSULES 0.25 MCG 0.25 MCG: 0.25 CAPSULE ORAL at 04:58

## 2020-01-01 RX ADMIN — ATORVASTATIN CALCIUM 40 MG: 40 TABLET, FILM COATED ORAL at 21:53

## 2020-01-01 RX ADMIN — OXYCODONE HYDROCHLORIDE 5 MG: 5 TABLET ORAL at 18:58

## 2020-01-01 RX ADMIN — IOHEXOL 171 ML: 350 INJECTION, SOLUTION INTRAVENOUS at 23:48

## 2020-01-01 RX ADMIN — FENTANYL CITRATE 50 MCG: 50 INJECTION, SOLUTION INTRAMUSCULAR; INTRAVENOUS at 16:04

## 2020-01-01 RX ADMIN — METHIMAZOLE 5 MG: 5 TABLET ORAL at 17:08

## 2020-01-01 RX ADMIN — MORPHINE SULFATE 4 MG: 4 INJECTION INTRAVENOUS at 20:18

## 2020-01-01 RX ADMIN — CARVEDILOL 3.12 MG: 3.12 TABLET, FILM COATED ORAL at 06:02

## 2020-01-01 RX ADMIN — Medication 667 MG: at 16:54

## 2020-01-01 RX ADMIN — MORPHINE SULFATE 4 MG: 4 INJECTION INTRAVENOUS at 23:39

## 2020-01-01 RX ADMIN — Medication 667 MG: at 05:02

## 2020-01-01 RX ADMIN — Medication 667 MG: at 16:19

## 2020-01-01 RX ADMIN — APIXABAN 2.5 MG: 5 TABLET, FILM COATED ORAL at 17:29

## 2020-01-01 RX ADMIN — Medication 667 MG: at 11:21

## 2020-01-01 RX ADMIN — Medication 667 MG: at 12:15

## 2020-01-01 RX ADMIN — CARVEDILOL 3.12 MG: 3.12 TABLET, FILM COATED ORAL at 16:54

## 2020-01-01 RX ADMIN — APIXABAN 2.5 MG: 5 TABLET, FILM COATED ORAL at 05:02

## 2020-01-01 RX ADMIN — HEPARIN SODIUM 3700 UNITS: 1000 INJECTION, SOLUTION INTRAVENOUS; SUBCUTANEOUS at 11:37

## 2020-01-01 RX ADMIN — OMEPRAZOLE 20 MG: 20 CAPSULE, DELAYED RELEASE ORAL at 06:01

## 2020-01-01 RX ADMIN — ONDANSETRON 4 MG: 2 INJECTION INTRAMUSCULAR; INTRAVENOUS at 09:40

## 2020-01-01 RX ADMIN — HYDROMORPHONE HYDROCHLORIDE 0.5 MG: 1 INJECTION, SOLUTION INTRAMUSCULAR; INTRAVENOUS; SUBCUTANEOUS at 00:13

## 2020-01-01 RX ADMIN — HEPARIN SODIUM 5000 UNITS: 5000 INJECTION, SOLUTION INTRAVENOUS; SUBCUTANEOUS at 20:27

## 2020-01-01 RX ADMIN — METHIMAZOLE 7.5 MG: 5 TABLET ORAL at 04:58

## 2020-01-01 RX ADMIN — ASPIRIN 81 MG: 81 TABLET, CHEWABLE ORAL at 05:01

## 2020-01-01 RX ADMIN — MORPHINE SULFATE 4 MG: 4 INJECTION INTRAVENOUS at 08:11

## 2020-01-01 RX ADMIN — SODIUM CHLORIDE: 9 INJECTION, SOLUTION INTRAVENOUS at 15:03

## 2020-01-01 RX ADMIN — Medication 667 MG: at 17:04

## 2020-01-01 RX ADMIN — OXYCODONE HYDROCHLORIDE 5 MG: 5 TABLET ORAL at 06:02

## 2020-01-01 RX ADMIN — OMEPRAZOLE 20 MG: 20 CAPSULE, DELAYED RELEASE ORAL at 05:02

## 2020-01-01 RX ADMIN — ASPIRIN 81 MG: 81 TABLET, CHEWABLE ORAL at 04:58

## 2020-01-01 RX ADMIN — CARVEDILOL 3.12 MG: 3.12 TABLET, FILM COATED ORAL at 16:37

## 2020-01-01 RX ADMIN — MORPHINE SULFATE 4 MG: 4 INJECTION INTRAVENOUS at 16:56

## 2020-01-01 RX ADMIN — PIPERACILLIN AND TAZOBACTAM 4.5 G: 4; .5 INJECTION, POWDER, LYOPHILIZED, FOR SOLUTION INTRAVENOUS; PARENTERAL at 16:37

## 2020-01-01 RX ADMIN — ASPIRIN 81 MG: 81 TABLET, CHEWABLE ORAL at 06:06

## 2020-01-01 RX ADMIN — OXYCODONE HYDROCHLORIDE 5 MG: 5 TABLET ORAL at 12:18

## 2020-01-01 RX ADMIN — CLOPIDOGREL BISULFATE 75 MG: 75 TABLET ORAL at 04:58

## 2020-01-01 RX ADMIN — Medication 100 MCG: at 16:49

## 2020-01-01 RX ADMIN — Medication 667 MG: at 11:14

## 2020-01-01 RX ADMIN — METHIMAZOLE 7.5 MG: 5 TABLET ORAL at 06:28

## 2020-01-01 RX ADMIN — Medication 667 MG: at 11:23

## 2020-01-01 RX ADMIN — OXYCODONE HYDROCHLORIDE 5 MG: 5 TABLET ORAL at 12:48

## 2020-01-01 RX ADMIN — Medication 667 MG: at 08:09

## 2020-01-01 RX ADMIN — VANCOMYCIN HYDROCHLORIDE 1250 MG: 500 INJECTION, POWDER, LYOPHILIZED, FOR SOLUTION INTRAVENOUS at 20:56

## 2020-01-01 RX ADMIN — Medication 667 MG: at 12:48

## 2020-01-01 RX ADMIN — CLOPIDOGREL BISULFATE 75 MG: 75 TABLET ORAL at 12:00

## 2020-01-01 RX ADMIN — CARVEDILOL 3.12 MG: 3.12 TABLET, FILM COATED ORAL at 08:09

## 2020-01-01 RX ADMIN — APIXABAN 2.5 MG: 5 TABLET, FILM COATED ORAL at 12:00

## 2020-01-01 RX ADMIN — Medication 100 MCG: at 09:36

## 2020-01-01 RX ADMIN — METHIMAZOLE 5 MG: 5 TABLET ORAL at 17:28

## 2020-01-01 RX ADMIN — CARVEDILOL 3.12 MG: 3.12 TABLET, FILM COATED ORAL at 08:46

## 2020-01-01 RX ADMIN — HEPARIN SODIUM 3700 UNITS: 1000 INJECTION, SOLUTION INTRAVENOUS; SUBCUTANEOUS at 15:07

## 2020-01-01 RX ADMIN — IOHEXOL 100 ML: 350 INJECTION, SOLUTION INTRAVENOUS at 19:12

## 2020-01-01 RX ADMIN — INSULIN LISPRO 1 UNITS: 100 INJECTION, SOLUTION INTRAVENOUS; SUBCUTANEOUS at 06:26

## 2020-01-01 RX ADMIN — CALCITRIOL CAPSULES 0.25 MCG 0.25 MCG: 0.25 CAPSULE ORAL at 05:01

## 2020-01-01 RX ADMIN — PROPOFOL 150 MG: 10 INJECTION, EMULSION INTRAVENOUS at 16:05

## 2020-01-01 RX ADMIN — OXYCODONE HYDROCHLORIDE 10 MG: 10 TABLET ORAL at 16:37

## 2020-01-01 RX ADMIN — PIPERACILLIN AND TAZOBACTAM 4.5 G: 4; .5 INJECTION, POWDER, LYOPHILIZED, FOR SOLUTION INTRAVENOUS; PARENTERAL at 02:41

## 2020-01-01 RX ADMIN — DOCUSATE SODIUM 50 MG AND SENNOSIDES 8.6 MG 2 TABLET: 8.6; 5 TABLET, FILM COATED ORAL at 05:01

## 2020-01-01 RX ADMIN — INSULIN LISPRO 1 UNITS: 100 INJECTION, SOLUTION INTRAVENOUS; SUBCUTANEOUS at 16:22

## 2020-01-01 RX ADMIN — CEPHALEXIN 500 MG: 500 CAPSULE ORAL at 17:29

## 2020-01-01 RX ADMIN — FENTANYL CITRATE 50 MCG: 50 INJECTION, SOLUTION INTRAMUSCULAR; INTRAVENOUS at 17:16

## 2020-01-01 RX ADMIN — ATORVASTATIN CALCIUM 40 MG: 40 TABLET, FILM COATED ORAL at 21:35

## 2020-01-01 RX ADMIN — INSULIN LISPRO 1 UNITS: 100 INJECTION, SOLUTION INTRAVENOUS; SUBCUTANEOUS at 07:34

## 2020-01-01 RX ADMIN — ASPIRIN 81 MG: 81 TABLET, CHEWABLE ORAL at 12:00

## 2020-01-01 RX ADMIN — OMEPRAZOLE 20 MG: 20 CAPSULE, DELAYED RELEASE ORAL at 04:58

## 2020-01-01 RX ADMIN — VANCOMYCIN HYDROCHLORIDE 2000 MG: 500 INJECTION, POWDER, LYOPHILIZED, FOR SOLUTION INTRAVENOUS at 00:14

## 2020-01-01 RX ADMIN — ACETAMINOPHEN 650 MG: 325 TABLET, FILM COATED ORAL at 21:35

## 2020-01-01 RX ADMIN — ATORVASTATIN CALCIUM 40 MG: 40 TABLET, FILM COATED ORAL at 21:06

## 2020-01-01 RX ADMIN — ROPIVACAINE HYDROCHLORIDE 30 ML: 5 INJECTION, SOLUTION EPIDURAL; INFILTRATION; PERINEURAL at 16:05

## 2020-01-01 RX ADMIN — MORPHINE SULFATE 4 MG: 4 INJECTION INTRAVENOUS at 15:24

## 2020-01-01 RX ADMIN — METHIMAZOLE 5 MG: 5 TABLET ORAL at 17:05

## 2020-01-01 RX ADMIN — FENTANYL CITRATE 50 MCG: 50 INJECTION, SOLUTION INTRAMUSCULAR; INTRAVENOUS at 17:09

## 2020-01-01 RX ADMIN — ACETAMINOPHEN 650 MG: 325 TABLET, FILM COATED ORAL at 12:48

## 2020-01-01 RX ADMIN — Medication 667 MG: at 06:27

## 2020-01-01 RX ADMIN — CARVEDILOL 3.12 MG: 3.12 TABLET, FILM COATED ORAL at 06:27

## 2020-01-01 RX ADMIN — METHIMAZOLE 7.5 MG: 5 TABLET ORAL at 06:06

## 2020-01-01 RX ADMIN — SODIUM CHLORIDE 3 G: 900 INJECTION INTRAVENOUS at 22:44

## 2020-01-01 RX ADMIN — EPOETIN ALFA-EPBX 10000 UNITS: 10000 INJECTION, SOLUTION INTRAVENOUS; SUBCUTANEOUS at 11:37

## 2020-01-01 RX ADMIN — EPOETIN ALFA-EPBX 10000 UNITS: 10000 INJECTION, SOLUTION INTRAVENOUS; SUBCUTANEOUS at 17:26

## 2020-01-01 RX ADMIN — INSULIN HUMAN 1 UNITS: 100 INJECTION, SOLUTION PARENTERAL at 11:27

## 2020-01-01 RX ADMIN — MIDAZOLAM 2 MG: 1 INJECTION INTRAMUSCULAR; INTRAVENOUS at 09:09

## 2020-01-01 RX ADMIN — HEPARIN SODIUM 5000 UNITS: 5000 INJECTION, SOLUTION INTRAVENOUS; SUBCUTANEOUS at 17:04

## 2020-01-01 RX ADMIN — CALCITRIOL CAPSULES 0.25 MCG 0.25 MCG: 0.25 CAPSULE ORAL at 06:06

## 2020-01-01 RX ADMIN — PIPERACILLIN AND TAZOBACTAM 4.5 G: 4; .5 INJECTION, POWDER, LYOPHILIZED, FOR SOLUTION INTRAVENOUS; PARENTERAL at 17:06

## 2020-01-01 RX ADMIN — INSULIN LISPRO 2 UNITS: 100 INJECTION, SOLUTION INTRAVENOUS; SUBCUTANEOUS at 12:12

## 2020-01-01 RX ADMIN — ASPIRIN 81 MG: 81 TABLET, CHEWABLE ORAL at 07:34

## 2020-01-01 RX ADMIN — HYDROMORPHONE HYDROCHLORIDE 0.5 MG: 1 INJECTION, SOLUTION INTRAMUSCULAR; INTRAVENOUS; SUBCUTANEOUS at 22:43

## 2020-01-01 RX ADMIN — CARVEDILOL 3.12 MG: 3.12 TABLET, FILM COATED ORAL at 07:53

## 2020-01-01 RX ADMIN — CARVEDILOL 3.12 MG: 3.12 TABLET, FILM COATED ORAL at 17:57

## 2020-01-01 RX ADMIN — HEPARIN SODIUM 3700 UNITS: 1000 INJECTION, SOLUTION INTRAVENOUS; SUBCUTANEOUS at 16:42

## 2020-01-01 RX ADMIN — OXYCODONE HYDROCHLORIDE 10 MG: 10 TABLET ORAL at 11:21

## 2020-01-01 RX ADMIN — Medication 100 MCG: at 09:41

## 2020-01-01 RX ADMIN — CARVEDILOL 3.12 MG: 3.12 TABLET, FILM COATED ORAL at 17:28

## 2020-01-01 RX ADMIN — METHIMAZOLE 5 MG: 5 TABLET ORAL at 23:13

## 2020-01-01 RX ADMIN — METHIMAZOLE 7.5 MG: 5 TABLET ORAL at 06:02

## 2020-01-01 RX ADMIN — CLOPIDOGREL BISULFATE 75 MG: 75 TABLET ORAL at 05:01

## 2020-01-01 RX ADMIN — APIXABAN 2.5 MG: 5 TABLET, FILM COATED ORAL at 21:53

## 2020-01-01 RX ADMIN — METHIMAZOLE 7.5 MG: 5 TABLET ORAL at 05:02

## 2020-01-01 RX ADMIN — INSULIN LISPRO 1 UNITS: 100 INJECTION, SOLUTION INTRAVENOUS; SUBCUTANEOUS at 11:57

## 2020-01-01 RX ADMIN — Medication 667 MG: at 16:37

## 2020-01-01 RX ADMIN — Medication 667 MG: at 06:01

## 2020-01-01 RX ADMIN — OXYCODONE HYDROCHLORIDE 5 MG: 5 TABLET ORAL at 16:19

## 2020-01-01 RX ADMIN — MORPHINE SULFATE 4 MG: 4 INJECTION INTRAVENOUS at 07:03

## 2020-01-01 RX ADMIN — POVIDONE IODINE 15 ML: 100 SOLUTION TOPICAL at 14:40

## 2020-01-01 RX ADMIN — CARVEDILOL 3.12 MG: 3.12 TABLET, FILM COATED ORAL at 17:04

## 2020-01-01 RX ADMIN — ATORVASTATIN CALCIUM 40 MG: 40 TABLET, FILM COATED ORAL at 20:25

## 2020-01-01 RX ADMIN — FENTANYL CITRATE 25 MCG: 50 INJECTION, SOLUTION INTRAMUSCULAR; INTRAVENOUS at 17:57

## 2020-01-01 RX ADMIN — ACETAMINOPHEN 650 MG: 325 TABLET, FILM COATED ORAL at 21:40

## 2020-01-01 RX ADMIN — IOHEXOL 40 ML: 350 INJECTION, SOLUTION INTRAVENOUS at 19:15

## 2020-01-01 RX ADMIN — OXYCODONE HYDROCHLORIDE 10 MG: 10 TABLET ORAL at 06:06

## 2020-01-01 RX ADMIN — FENTANYL CITRATE 50 MCG: 50 INJECTION, SOLUTION INTRAMUSCULAR; INTRAVENOUS at 09:10

## 2020-01-01 RX ADMIN — OMEPRAZOLE 20 MG: 20 CAPSULE, DELAYED RELEASE ORAL at 06:27

## 2020-01-01 RX ADMIN — CEFAZOLIN 2 G: 330 INJECTION, POWDER, FOR SOLUTION INTRAMUSCULAR; INTRAVENOUS at 09:12

## 2020-01-01 RX ADMIN — Medication 667 MG: at 11:36

## 2020-01-01 RX ADMIN — METHIMAZOLE 5 MG: 5 TABLET ORAL at 17:57

## 2020-01-01 RX ADMIN — FENTANYL CITRATE 50 MCG: 50 INJECTION, SOLUTION INTRAMUSCULAR; INTRAVENOUS at 16:40

## 2020-01-01 RX ADMIN — SODIUM CHLORIDE: 9 INJECTION, SOLUTION INTRAVENOUS at 09:08

## 2020-01-01 RX ADMIN — OXYCODONE HYDROCHLORIDE 10 MG: 10 TABLET ORAL at 11:14

## 2020-01-01 RX ADMIN — ATORVASTATIN CALCIUM 40 MG: 40 TABLET, FILM COATED ORAL at 22:04

## 2020-01-01 RX ADMIN — Medication 100 MCG: at 16:07

## 2020-01-01 RX ADMIN — ATORVASTATIN CALCIUM 40 MG: 40 TABLET, FILM COATED ORAL at 21:34

## 2020-01-01 RX ADMIN — EPOETIN ALFA-EPBX 10000 UNITS: 10000 INJECTION, SOLUTION INTRAVENOUS; SUBCUTANEOUS at 16:21

## 2020-01-01 RX ADMIN — Medication 667 MG: at 17:29

## 2020-01-01 RX ADMIN — ACETAMINOPHEN 1000 MG: 500 TABLET ORAL at 21:33

## 2020-01-01 RX ADMIN — CEPHALEXIN 500 MG: 500 CAPSULE ORAL at 17:12

## 2020-01-01 RX ADMIN — GUAIFENESIN 600 MG: 600 TABLET, EXTENDED RELEASE ORAL at 21:35

## 2020-01-01 RX ADMIN — Medication 667 MG: at 12:00

## 2020-01-01 RX ADMIN — Medication 667 MG: at 17:05

## 2020-01-01 RX ADMIN — HEPARIN SODIUM 5000 UNITS: 5000 INJECTION, SOLUTION INTRAVENOUS; SUBCUTANEOUS at 07:34

## 2020-01-01 RX ADMIN — ASPIRIN 324 MG: 81 TABLET, CHEWABLE ORAL at 22:12

## 2020-01-01 RX ADMIN — CARVEDILOL 3.12 MG: 3.12 TABLET, FILM COATED ORAL at 08:40

## 2020-01-01 RX ADMIN — METHIMAZOLE 5 MG: 5 TABLET ORAL at 16:37

## 2020-01-01 RX ADMIN — EPHEDRINE SULFATE 50 MG: 50 INJECTION, SOLUTION INTRAVENOUS at 16:49

## 2020-01-01 RX ADMIN — CEFAZOLIN 2 G: 330 INJECTION, POWDER, FOR SOLUTION INTRAMUSCULAR; INTRAVENOUS at 16:05

## 2020-01-01 RX ADMIN — PIPERACILLIN AND TAZOBACTAM 4.5 G: 4; .5 INJECTION, POWDER, LYOPHILIZED, FOR SOLUTION INTRAVENOUS; PARENTERAL at 06:04

## 2020-01-01 RX ADMIN — FENTANYL CITRATE 50 MCG: 50 INJECTION, SOLUTION INTRAMUSCULAR; INTRAVENOUS at 16:17

## 2020-01-01 RX ADMIN — ATORVASTATIN CALCIUM 40 MG: 40 TABLET, FILM COATED ORAL at 20:22

## 2020-01-01 RX ADMIN — OXYCODONE HYDROCHLORIDE 10 MG: 5 SOLUTION ORAL at 18:20

## 2020-01-01 RX ADMIN — PIPERACILLIN AND TAZOBACTAM 4.5 G: 4; .5 INJECTION, POWDER, LYOPHILIZED, FOR SOLUTION INTRAVENOUS; PARENTERAL at 16:54

## 2020-01-01 RX ADMIN — CALCITRIOL CAPSULES 0.25 MCG 0.25 MCG: 0.25 CAPSULE ORAL at 06:27

## 2020-01-01 RX ADMIN — FENTANYL CITRATE 25 MCG: 50 INJECTION, SOLUTION INTRAMUSCULAR; INTRAVENOUS at 18:00

## 2020-01-01 RX ADMIN — ACETAMINOPHEN 650 MG: 325 TABLET, FILM COATED ORAL at 18:58

## 2020-01-01 RX ADMIN — PIPERACILLIN AND TAZOBACTAM 4.5 G: 4; .5 INJECTION, POWDER, LYOPHILIZED, FOR SOLUTION INTRAVENOUS; PARENTERAL at 06:27

## 2020-01-01 RX ADMIN — CEPHALEXIN 500 MG: 500 CAPSULE ORAL at 17:58

## 2020-01-01 RX ADMIN — CARVEDILOL 3.12 MG: 3.12 TABLET, FILM COATED ORAL at 17:05

## 2020-01-01 RX ADMIN — OXYCODONE HYDROCHLORIDE 10 MG: 10 TABLET ORAL at 17:05

## 2020-01-01 RX ADMIN — OMEPRAZOLE 20 MG: 20 CAPSULE, DELAYED RELEASE ORAL at 06:06

## 2020-01-01 RX ADMIN — PIPERACILLIN AND TAZOBACTAM 4.5 G: 4; .5 INJECTION, POWDER, LYOPHILIZED, FOR SOLUTION INTRAVENOUS; PARENTERAL at 05:13

## 2020-01-01 RX ADMIN — METHIMAZOLE 5 MG: 5 TABLET ORAL at 16:55

## 2020-01-01 RX ADMIN — ACETAMINOPHEN 650 MG: 325 TABLET, FILM COATED ORAL at 08:45

## 2020-01-01 RX ADMIN — INSULIN LISPRO 1 UNITS: 100 INJECTION, SOLUTION INTRAVENOUS; SUBCUTANEOUS at 17:31

## 2020-01-01 RX ADMIN — Medication 667 MG: at 07:53

## 2020-01-01 RX ADMIN — APIXABAN 2.5 MG: 5 TABLET, FILM COATED ORAL at 04:58

## 2020-01-01 RX ADMIN — CALCITRIOL CAPSULES 0.25 MCG 0.25 MCG: 0.25 CAPSULE ORAL at 06:01

## 2020-01-01 ASSESSMENT — COGNITIVE AND FUNCTIONAL STATUS - GENERAL
MOVING TO AND FROM BED TO CHAIR: A LITTLE
SUGGESTED CMS G CODE MODIFIER DAILY ACTIVITY: CI
SUGGESTED CMS G CODE MODIFIER MOBILITY: CK
CLIMB 3 TO 5 STEPS WITH RAILING: A LITTLE
MOVING FROM LYING ON BACK TO SITTING ON SIDE OF FLAT BED: A LITTLE
MOVING TO AND FROM BED TO CHAIR: A LITTLE
WALKING IN HOSPITAL ROOM: A LITTLE
STANDING UP FROM CHAIR USING ARMS: A LITTLE
TOILETING: A LITTLE
STANDING UP FROM CHAIR USING ARMS: A LITTLE
MOBILITY SCORE: 19
PERSONAL GROOMING: A LITTLE
HELP NEEDED FOR BATHING: A LITTLE
EATING MEALS: A LITTLE
DRESSING REGULAR LOWER BODY CLOTHING: A LITTLE
WALKING IN HOSPITAL ROOM: A LOT
SUGGESTED CMS G CODE MODIFIER MOBILITY: CK
DRESSING REGULAR UPPER BODY CLOTHING: A LITTLE
MOVING FROM LYING ON BACK TO SITTING ON SIDE OF FLAT BED: A LITTLE
DAILY ACTIVITIY SCORE: 19
MOBILITY SCORE: 17
MOBILITY SCORE: 18
CLIMB 3 TO 5 STEPS WITH RAILING: A LOT
MOBILITY SCORE: 19
DRESSING REGULAR LOWER BODY CLOTHING: A LITTLE
SUGGESTED CMS G CODE MODIFIER DAILY ACTIVITY: CH
DRESSING REGULAR UPPER BODY CLOTHING: A LITTLE
WALKING IN HOSPITAL ROOM: A LITTLE
CLIMB 3 TO 5 STEPS WITH RAILING: A LITTLE
MOVING FROM LYING ON BACK TO SITTING ON SIDE OF FLAT BED: A LITTLE
DAILY ACTIVITIY SCORE: 20
MOVING TO AND FROM BED TO CHAIR: A LITTLE
WALKING IN HOSPITAL ROOM: A LITTLE
STANDING UP FROM CHAIR USING ARMS: A LITTLE
SUGGESTED CMS G CODE MODIFIER MOBILITY: CK
SUGGESTED CMS G CODE MODIFIER DAILY ACTIVITY: CK
MOVING TO AND FROM BED TO CHAIR: A LITTLE
TOILETING: A LITTLE
SUGGESTED CMS G CODE MODIFIER MOBILITY: CK
DRESSING REGULAR LOWER BODY CLOTHING: A LITTLE
DAILY ACTIVITIY SCORE: 23
SUGGESTED CMS G CODE MODIFIER DAILY ACTIVITY: CJ
TURNING FROM BACK TO SIDE WHILE IN FLAT BAD: A LITTLE
DAILY ACTIVITIY SCORE: 24
CLIMB 3 TO 5 STEPS WITH RAILING: A LITTLE
STANDING UP FROM CHAIR USING ARMS: A LITTLE
MOVING FROM LYING ON BACK TO SITTING ON SIDE OF FLAT BED: A LITTLE

## 2020-01-01 ASSESSMENT — LIFESTYLE VARIABLES
HAVE YOU EVER FELT YOU SHOULD CUT DOWN ON YOUR DRINKING: NO
EVER FELT BAD OR GUILTY ABOUT YOUR DRINKING: NO
ALCOHOL_USE: NO
HAVE YOU EVER FELT YOU SHOULD CUT DOWN ON YOUR DRINKING: NO
CONSUMPTION TOTAL: NEGATIVE
TOTAL SCORE: 0
AVERAGE NUMBER OF DAYS PER WEEK YOU HAVE A DRINK CONTAINING ALCOHOL: 0
HOW MANY TIMES IN THE PAST YEAR HAVE YOU HAD 5 OR MORE DRINKS IN A DAY: 0
TOTAL SCORE: 0
EVER HAD A DRINK FIRST THING IN THE MORNING TO STEADY YOUR NERVES TO GET RID OF A HANGOVER: NO
ALCOHOL_USE: NO
DOES PATIENT WANT TO STOP DRINKING: NO
AVERAGE NUMBER OF DAYS PER WEEK YOU HAVE A DRINK CONTAINING ALCOHOL: 0
ON A TYPICAL DAY WHEN YOU DRINK ALCOHOL HOW MANY DRINKS DO YOU HAVE: 0
ON A TYPICAL DAY WHEN YOU DRINK ALCOHOL HOW MANY DRINKS DO YOU HAVE: 0
TOTAL SCORE: 0
CONSUMPTION TOTAL: NEGATIVE
EVER HAD A DRINK FIRST THING IN THE MORNING TO STEADY YOUR NERVES TO GET RID OF A HANGOVER: NO
TOTAL SCORE: 0
HAVE PEOPLE ANNOYED YOU BY CRITICIZING YOUR DRINKING: NO
DOES PATIENT WANT TO STOP DRINKING: NO
EVER FELT BAD OR GUILTY ABOUT YOUR DRINKING: NO
TOTAL SCORE: 0
HOW MANY TIMES IN THE PAST YEAR HAVE YOU HAD 5 OR MORE DRINKS IN A DAY: 0
HAVE PEOPLE ANNOYED YOU BY CRITICIZING YOUR DRINKING: NO
TOTAL SCORE: 0

## 2020-01-01 ASSESSMENT — ENCOUNTER SYMPTOMS
MYALGIAS: 0
COUGH: 0
HEADACHES: 0
FEVER: 0
VOMITING: 0
WEAKNESS: 0
CHILLS: 0
CHILLS: 0
BLOOD IN STOOL: 0
DOUBLE VISION: 0
DIARRHEA: 0
COUGH: 0
ORTHOPNEA: 0
SPUTUM PRODUCTION: 0
CONSTIPATION: 0
BLURRED VISION: 0
BRUISES/BLEEDS EASILY: 0
FALLS: 0
LOSS OF CONSCIOUSNESS: 0
BLURRED VISION: 0
NAUSEA: 0
SHORTNESS OF BREATH: 0
WEAKNESS: 0
HEADACHES: 0
DIAPHORESIS: 0
MYALGIAS: 0
SPUTUM PRODUCTION: 0
DEPRESSION: 0
BLURRED VISION: 0
SHORTNESS OF BREATH: 0
PND: 0
DIZZINESS: 0
DIZZINESS: 0
SENSORY CHANGE: 0
MYALGIAS: 0
DEPRESSION: 0
FOCAL WEAKNESS: 0
DIZZINESS: 0
COUGH: 0
SHORTNESS OF BREATH: 0
SHORTNESS OF BREATH: 0
SPEECH CHANGE: 0
FEVER: 0
DIAPHORESIS: 0
NAUSEA: 0
SPUTUM PRODUCTION: 0
TREMORS: 0
NAUSEA: 0
CHILLS: 1
LOSS OF CONSCIOUSNESS: 0
VOMITING: 0
FALLS: 0
DOUBLE VISION: 0
SPUTUM PRODUCTION: 0
LOSS OF CONSCIOUSNESS: 0
CONSTIPATION: 0
ORTHOPNEA: 0
FALLS: 0
DIAPHORESIS: 0
ORTHOPNEA: 0
DIARRHEA: 0
VOMITING: 0
DOUBLE VISION: 0
PALPITATIONS: 0
FALLS: 0
SENSORY CHANGE: 0
ABDOMINAL PAIN: 0
ABDOMINAL PAIN: 0
VOMITING: 0
BLOOD IN STOOL: 0
PND: 0
DIAPHORESIS: 0
DEPRESSION: 0
VOMITING: 0
DEPRESSION: 0
DIARRHEA: 0
DIARRHEA: 0
NERVOUS/ANXIOUS: 0
FEVER: 0
WEAKNESS: 0
ABDOMINAL PAIN: 0
HEADACHES: 0
HEARTBURN: 0
BLURRED VISION: 0
PALPITATIONS: 0
NAUSEA: 0
HEARTBURN: 0
LOSS OF CONSCIOUSNESS: 0
MEMORY LOSS: 0
SHORTNESS OF BREATH: 0
MYALGIAS: 1
FALLS: 0
HEARTBURN: 0
MYALGIAS: 0
ABDOMINAL PAIN: 0
SEIZURES: 0
DOUBLE VISION: 0
COUGH: 0
SPEECH CHANGE: 0
HEADACHES: 0
FOCAL WEAKNESS: 0
VOMITING: 0
NERVOUS/ANXIOUS: 0
VOMITING: 0
ORTHOPNEA: 0
CHILLS: 0
MEMORY LOSS: 0
CHILLS: 0
HEADACHES: 0
CHILLS: 0
DEPRESSION: 0
BLOOD IN STOOL: 0
NAUSEA: 0
DEPRESSION: 0
ABDOMINAL PAIN: 0
CLAUDICATION: 0
PALPITATIONS: 0
PALPITATIONS: 0
PSYCHIATRIC NEGATIVE: 1
NAUSEA: 0
LOSS OF CONSCIOUSNESS: 0
NAUSEA: 0
SPUTUM PRODUCTION: 0
COUGH: 0
DIZZINESS: 0
FEVER: 0
PND: 0
DIARRHEA: 0
DEPRESSION: 0
SHORTNESS OF BREATH: 1
PND: 0
CONSTIPATION: 0
DIZZINESS: 0
PALPITATIONS: 0
BLOOD IN STOOL: 0
FEVER: 1
DOUBLE VISION: 0
DIZZINESS: 0
SEIZURES: 0
MYALGIAS: 0
ABDOMINAL PAIN: 0
CONSTIPATION: 0
WEAKNESS: 0
SHORTNESS OF BREATH: 0
COUGH: 0
SHORTNESS OF BREATH: 0
BLOOD IN STOOL: 0
FALLS: 0
SPEECH CHANGE: 0
WEAKNESS: 0
COUGH: 0
ORTHOPNEA: 0
LOSS OF CONSCIOUSNESS: 0
TREMORS: 0
MYALGIAS: 0
CHILLS: 0
FEVER: 0
DIAPHORESIS: 0
FEVER: 1
BLURRED VISION: 0
LOSS OF CONSCIOUSNESS: 0
DIZZINESS: 0
PND: 0
HEADACHES: 0
SENSORY CHANGE: 0
PALPITATIONS: 0
PALPITATIONS: 0
CONSTIPATION: 0

## 2020-01-01 ASSESSMENT — PAIN DESCRIPTION - PAIN TYPE
TYPE: ACUTE PAIN
TYPE: SURGICAL PAIN
TYPE: ACUTE PAIN
TYPE: SURGICAL PAIN
TYPE: ACUTE PAIN
TYPE: SURGICAL PAIN
TYPE: SURGICAL PAIN
TYPE: ACUTE PAIN
TYPE: ACUTE PAIN
TYPE: ACUTE PAIN;SURGICAL PAIN
TYPE: ACUTE PAIN
TYPE: SURGICAL PAIN
TYPE: ACUTE PAIN
TYPE: SURGICAL PAIN

## 2020-01-01 ASSESSMENT — ACTIVITIES OF DAILY LIVING (ADL): TOILETING: INDEPENDENT

## 2020-01-01 ASSESSMENT — CHA2DS2 SCORE
AGE 75 OR GREATER: YES
AGE 65 TO 74: NO
SEX: MALE
HYPERTENSION: YES
VASCULAR DISEASE: YES
PRIOR STROKE OR TIA OR THROMBOEMBOLISM: NO
DIABETES: YES
CHA2DS2 VASC SCORE: 5
CHF OR LEFT VENTRICULAR DYSFUNCTION: NO

## 2020-01-01 ASSESSMENT — FIBROSIS 4 INDEX
FIB4 SCORE: 0.57
FIB4 SCORE: 1.83
FIB4 SCORE: 1.83
FIB4 SCORE: 0.6
FIB4 SCORE: 0.98
FIB4 SCORE: 3.77
FIB4 SCORE: 0.6
FIB4 SCORE: 0.51
FIB4 SCORE: 0.98
FIB4 SCORE: 3.77
FIB4 SCORE: 1.3
FIB4 SCORE: 0.95

## 2020-01-01 ASSESSMENT — PATIENT HEALTH QUESTIONNAIRE - PHQ9
2. FEELING DOWN, DEPRESSED, IRRITABLE, OR HOPELESS: NOT AT ALL
1. LITTLE INTEREST OR PLEASURE IN DOING THINGS: NOT AT ALL
SUM OF ALL RESPONSES TO PHQ9 QUESTIONS 1 AND 2: 0
SUM OF ALL RESPONSES TO PHQ9 QUESTIONS 1 AND 2: 0
2. FEELING DOWN, DEPRESSED, IRRITABLE, OR HOPELESS: NOT AT ALL
1. LITTLE INTEREST OR PLEASURE IN DOING THINGS: NOT AT ALL

## 2020-01-01 ASSESSMENT — PAIN SCALES - GENERAL
PAINLEVEL: 7=MODERATE-SEVERE PAIN
PAIN_LEVEL: 1
PAIN_LEVEL: 1

## 2020-01-01 ASSESSMENT — GAIT ASSESSMENTS
ASSISTIVE DEVICE: FRONT WHEEL WALKER
DEVIATION: ANTALGIC
DISTANCE (FEET): 250
GAIT LEVEL OF ASSIST: SUPERVISED

## 2020-01-01 NOTE — CARE PLAN
Problem: Bowel/Gastric:  Goal: Normal bowel function is maintained or improved  Outcome: PROGRESSING AS EXPECTED  Goal: Will not experience complications related to bowel motility  Outcome: PROGRESSING AS EXPECTED     Problem: Knowledge Deficit  Goal: Knowledge of disease process/condition, treatment plan, diagnostic tests, and medications will improve  Outcome: PROGRESSING AS EXPECTED  Goal: Knowledge of the prescribed therapeutic regimen will improve  Outcome: PROGRESSING AS EXPECTED     Problem: Discharge Barriers/Planning  Goal: Patient's continuum of care needs will be met  Outcome: PROGRESSING AS EXPECTED     Problem: Respiratory:  Goal: Respiratory status will improve  Outcome: PROGRESSING AS EXPECTED     Problem: Communication  Goal: The ability to communicate needs accurately and effectively will improve  Outcome: MET     Problem: Safety  Goal: Will remain free from injury  Outcome: MET     Problem: Infection  Goal: Will remain free from infection  Outcome: MET     Problem: Venous Thromboembolism (VTW)/Deep Vein Thrombosis (DVT) Prevention:  Goal: Patient will participate in Venous Thrombosis (VTE)/Deep Vein Thrombosis (DVT)Prevention Measures  Outcome: MET     Problem: Pain Management  Goal: Pain level will decrease to patient's comfort goal  Outcome: MET     Problem: Fluid Volume:  Goal: Will maintain balanced intake and output  Outcome: MET      PT. BORN AND PLACED ON MOM'D ABD. PT IS DRIED STIMULATED AND ASSESSED. PT HR
IS UP BUT COMES DOWN QUICKLY- MOM HAD A TEMP. OF 99.9 FOR THE LAST 30 MIN. OF
LABOR. PT TO KDC AT 30 MIN. MEDS GIVEN AND PT AND PARENTS ID'D. PT ATTEMPTS AT
[facility] OBSERVED

## 2020-01-02 ENCOUNTER — TELEPHONE (OUTPATIENT)
Dept: VASCULAR LAB | Facility: MEDICAL CENTER | Age: 75
End: 2020-01-02

## 2020-01-02 NOTE — TELEPHONE ENCOUNTER
Received anticoagulation referral for Eliquis due to atrial fibrillation from Dr. Anderson on 12-6-19    LM for patient to call back and establish care.    Insurance: Medicare  PCP: [facility]  [phone], fax [phone]    Samuel Cárdenas, PharmD

## 2020-02-17 ENCOUNTER — HOSPITAL ENCOUNTER (INPATIENT)
Facility: MEDICAL CENTER | Age: 75
LOS: 2 days | DRG: 551 | End: 2020-02-19
Attending: EMERGENCY MEDICINE | Admitting: INTERNAL MEDICINE
Payer: MEDICARE

## 2020-02-17 ENCOUNTER — APPOINTMENT (OUTPATIENT)
Dept: RADIOLOGY | Facility: MEDICAL CENTER | Age: 75
DRG: 551 | End: 2020-02-17
Attending: EMERGENCY MEDICINE
Payer: MEDICARE

## 2020-02-17 DIAGNOSIS — R07.9 CHEST PAIN, UNSPECIFIED TYPE: ICD-10-CM

## 2020-02-17 PROBLEM — R06.02 SHORTNESS OF BREATH: Status: ACTIVE | Noted: 2020-02-17

## 2020-02-17 LAB
ALBUMIN SERPL BCP-MCNC: 3.9 G/DL (ref 3.2–4.9)
ALBUMIN/GLOB SERPL: 1.2 G/DL
ALP SERPL-CCNC: 103 U/L (ref 30–99)
ALT SERPL-CCNC: 16 U/L (ref 2–50)
ANION GAP SERPL CALC-SCNC: 16 MMOL/L (ref 0–11.9)
AST SERPL-CCNC: 19 U/L (ref 12–45)
BASOPHILS # BLD AUTO: 0.1 % (ref 0–1.8)
BASOPHILS # BLD: 0.01 K/UL (ref 0–0.12)
BILIRUB SERPL-MCNC: 0.7 MG/DL (ref 0.1–1.5)
BUN SERPL-MCNC: 51 MG/DL (ref 8–22)
CALCIUM SERPL-MCNC: 9.2 MG/DL (ref 8.5–10.5)
CHLORIDE SERPL-SCNC: 102 MMOL/L (ref 96–112)
CO2 SERPL-SCNC: 20 MMOL/L (ref 20–33)
CREAT SERPL-MCNC: 5.16 MG/DL (ref 0.5–1.4)
EOSINOPHIL # BLD AUTO: 0 K/UL (ref 0–0.51)
EOSINOPHIL NFR BLD: 0 % (ref 0–6.9)
ERYTHROCYTE [DISTWIDTH] IN BLOOD BY AUTOMATED COUNT: 50.4 FL (ref 35.9–50)
GLOBULIN SER CALC-MCNC: 3.3 G/DL (ref 1.9–3.5)
GLUCOSE SERPL-MCNC: 120 MG/DL (ref 65–99)
HCT VFR BLD AUTO: 25.3 % (ref 42–52)
HGB BLD-MCNC: 8.5 G/DL (ref 14–18)
IMM GRANULOCYTES # BLD AUTO: 0.03 K/UL (ref 0–0.11)
IMM GRANULOCYTES NFR BLD AUTO: 0.4 % (ref 0–0.9)
LYMPHOCYTES # BLD AUTO: 1.24 K/UL (ref 1–4.8)
LYMPHOCYTES NFR BLD: 18.3 % (ref 22–41)
MCH RBC QN AUTO: 32.6 PG (ref 27–33)
MCHC RBC AUTO-ENTMCNC: 33.6 G/DL (ref 33.7–35.3)
MCV RBC AUTO: 96.9 FL (ref 81.4–97.8)
MONOCYTES # BLD AUTO: 0.57 K/UL (ref 0–0.85)
MONOCYTES NFR BLD AUTO: 8.4 % (ref 0–13.4)
NEUTROPHILS # BLD AUTO: 4.93 K/UL (ref 1.82–7.42)
NEUTROPHILS NFR BLD: 72.8 % (ref 44–72)
NRBC # BLD AUTO: 0 K/UL
NRBC BLD-RTO: 0 /100 WBC
PLATELET # BLD AUTO: 188 K/UL (ref 164–446)
PMV BLD AUTO: 9.2 FL (ref 9–12.9)
POTASSIUM SERPL-SCNC: 3.4 MMOL/L (ref 3.6–5.5)
PROT SERPL-MCNC: 7.2 G/DL (ref 6–8.2)
RBC # BLD AUTO: 2.61 M/UL (ref 4.7–6.1)
SODIUM SERPL-SCNC: 138 MMOL/L (ref 135–145)
TROPONIN T SERPL-MCNC: 123 NG/L (ref 6–19)
WBC # BLD AUTO: 6.8 K/UL (ref 4.8–10.8)

## 2020-02-17 PROCEDURE — 96374 THER/PROPH/DIAG INJ IV PUSH: CPT

## 2020-02-17 PROCEDURE — 93005 ELECTROCARDIOGRAM TRACING: CPT | Performed by: EMERGENCY MEDICINE

## 2020-02-17 PROCEDURE — 84484 ASSAY OF TROPONIN QUANT: CPT

## 2020-02-17 PROCEDURE — 71045 X-RAY EXAM CHEST 1 VIEW: CPT

## 2020-02-17 PROCEDURE — 94760 N-INVAS EAR/PLS OXIMETRY 1: CPT

## 2020-02-17 PROCEDURE — 80053 COMPREHEN METABOLIC PANEL: CPT

## 2020-02-17 PROCEDURE — 85025 COMPLETE CBC W/AUTO DIFF WBC: CPT

## 2020-02-17 PROCEDURE — 70360 X-RAY EXAM OF NECK: CPT

## 2020-02-17 PROCEDURE — 99285 EMERGENCY DEPT VISIT HI MDM: CPT

## 2020-02-17 PROCEDURE — 700111 HCHG RX REV CODE 636 W/ 250 OVERRIDE (IP): Performed by: STUDENT IN AN ORGANIZED HEALTH CARE EDUCATION/TRAINING PROGRAM

## 2020-02-17 PROCEDURE — 770020 HCHG ROOM/CARE - TELE (206)

## 2020-02-17 RX ORDER — DIPHENHYDRAMINE HYDROCHLORIDE 50 MG/ML
25 INJECTION INTRAMUSCULAR; INTRAVENOUS ONCE
Status: COMPLETED | OUTPATIENT
Start: 2020-02-18 | End: 2020-02-17

## 2020-02-17 RX ORDER — BISACODYL 10 MG
10 SUPPOSITORY, RECTAL RECTAL
Status: DISCONTINUED | OUTPATIENT
Start: 2020-02-17 | End: 2020-02-19 | Stop reason: HOSPADM

## 2020-02-17 RX ORDER — AMOXICILLIN 250 MG
2 CAPSULE ORAL 2 TIMES DAILY
Status: DISCONTINUED | OUTPATIENT
Start: 2020-02-17 | End: 2020-02-19 | Stop reason: HOSPADM

## 2020-02-17 RX ORDER — INSULIN GLARGINE 100 [IU]/ML
5 INJECTION, SOLUTION SUBCUTANEOUS
Status: ON HOLD | COMMUNITY
End: 2021-01-01

## 2020-02-17 RX ORDER — LABETALOL HYDROCHLORIDE 5 MG/ML
10 INJECTION, SOLUTION INTRAVENOUS EVERY 4 HOURS PRN
Status: DISCONTINUED | OUTPATIENT
Start: 2020-02-17 | End: 2020-02-19 | Stop reason: HOSPADM

## 2020-02-17 RX ORDER — HEPARIN SODIUM 5000 [USP'U]/ML
5000 INJECTION, SOLUTION INTRAVENOUS; SUBCUTANEOUS EVERY 8 HOURS
Status: DISCONTINUED | OUTPATIENT
Start: 2020-02-17 | End: 2020-02-18

## 2020-02-17 RX ORDER — POLYETHYLENE GLYCOL 3350 17 G/17G
1 POWDER, FOR SOLUTION ORAL
Status: DISCONTINUED | OUTPATIENT
Start: 2020-02-17 | End: 2020-02-19 | Stop reason: HOSPADM

## 2020-02-17 RX ORDER — CARVEDILOL 3.12 MG/1
3.12 TABLET ORAL 2 TIMES DAILY WITH MEALS
Status: ON HOLD | COMMUNITY
End: 2021-01-01

## 2020-02-17 RX ADMIN — DIPHENHYDRAMINE HYDROCHLORIDE 25 MG: 50 INJECTION, SOLUTION INTRAMUSCULAR; INTRAVENOUS at 23:55

## 2020-02-18 ENCOUNTER — APPOINTMENT (OUTPATIENT)
Dept: RADIOLOGY | Facility: MEDICAL CENTER | Age: 75
DRG: 551 | End: 2020-02-18
Attending: STUDENT IN AN ORGANIZED HEALTH CARE EDUCATION/TRAINING PROGRAM
Payer: MEDICARE

## 2020-02-18 PROBLEM — R13.10 DYSPHAGIA: Status: ACTIVE | Noted: 2020-02-18

## 2020-02-18 LAB
ALBUMIN SERPL BCP-MCNC: 3.8 G/DL (ref 3.2–4.9)
ALBUMIN/GLOB SERPL: 1.4 G/DL
ALP SERPL-CCNC: 88 U/L (ref 30–99)
ALT SERPL-CCNC: 14 U/L (ref 2–50)
ANION GAP SERPL CALC-SCNC: 15 MMOL/L (ref 0–11.9)
AST SERPL-CCNC: 18 U/L (ref 12–45)
BASOPHILS # BLD AUTO: 0.2 % (ref 0–1.8)
BASOPHILS # BLD: 0.01 K/UL (ref 0–0.12)
BILIRUB SERPL-MCNC: 0.6 MG/DL (ref 0.1–1.5)
BUN SERPL-MCNC: 50 MG/DL (ref 8–22)
CALCIUM SERPL-MCNC: 9 MG/DL (ref 8.5–10.5)
CHLORIDE SERPL-SCNC: 103 MMOL/L (ref 96–112)
CO2 SERPL-SCNC: 22 MMOL/L (ref 20–33)
CREAT SERPL-MCNC: 5.24 MG/DL (ref 0.5–1.4)
EOSINOPHIL # BLD AUTO: 0.02 K/UL (ref 0–0.51)
EOSINOPHIL NFR BLD: 0.3 % (ref 0–6.9)
ERYTHROCYTE [DISTWIDTH] IN BLOOD BY AUTOMATED COUNT: 51.5 FL (ref 35.9–50)
FERRITIN SERPL-MCNC: 18 NG/ML (ref 22–322)
GLOBULIN SER CALC-MCNC: 2.8 G/DL (ref 1.9–3.5)
GLUCOSE SERPL-MCNC: 104 MG/DL (ref 65–99)
HAV IGM SERPL QL IA: NEGATIVE
HBV CORE IGM SER QL: NEGATIVE
HBV SURFACE AB SERPL IA-ACNC: 27.5 MIU/ML (ref 0–10)
HBV SURFACE AG SER QL: NEGATIVE
HCT VFR BLD AUTO: 24.1 % (ref 42–52)
HCV AB SER QL: NEGATIVE
HGB BLD-MCNC: 8 G/DL (ref 14–18)
IMM GRANULOCYTES # BLD AUTO: 0.02 K/UL (ref 0–0.11)
IMM GRANULOCYTES NFR BLD AUTO: 0.3 % (ref 0–0.9)
IRON SATN MFR SERPL: 17 % (ref 15–55)
IRON SERPL-MCNC: 60 UG/DL (ref 50–180)
LYMPHOCYTES # BLD AUTO: 1.35 K/UL (ref 1–4.8)
LYMPHOCYTES NFR BLD: 20.8 % (ref 22–41)
MCH RBC QN AUTO: 32.7 PG (ref 27–33)
MCHC RBC AUTO-ENTMCNC: 33.2 G/DL (ref 33.7–35.3)
MCV RBC AUTO: 98.4 FL (ref 81.4–97.8)
MONOCYTES # BLD AUTO: 0.61 K/UL (ref 0–0.85)
MONOCYTES NFR BLD AUTO: 9.4 % (ref 0–13.4)
NEUTROPHILS # BLD AUTO: 4.48 K/UL (ref 1.82–7.42)
NEUTROPHILS NFR BLD: 69 % (ref 44–72)
NRBC # BLD AUTO: 0 K/UL
NRBC BLD-RTO: 0 /100 WBC
PLATELET # BLD AUTO: 180 K/UL (ref 164–446)
PMV BLD AUTO: 9.1 FL (ref 9–12.9)
POTASSIUM SERPL-SCNC: 3.5 MMOL/L (ref 3.6–5.5)
PROT SERPL-MCNC: 6.6 G/DL (ref 6–8.2)
RBC # BLD AUTO: 2.45 M/UL (ref 4.7–6.1)
SODIUM SERPL-SCNC: 140 MMOL/L (ref 135–145)
TIBC SERPL-MCNC: 350 UG/DL (ref 250–450)
TROPONIN T SERPL-MCNC: 138 NG/L (ref 6–19)
WBC # BLD AUTO: 6.5 K/UL (ref 4.8–10.8)

## 2020-02-18 PROCEDURE — 99222 1ST HOSP IP/OBS MODERATE 55: CPT | Mod: GC | Performed by: INTERNAL MEDICINE

## 2020-02-18 PROCEDURE — 82728 ASSAY OF FERRITIN: CPT

## 2020-02-18 PROCEDURE — 83540 ASSAY OF IRON: CPT

## 2020-02-18 PROCEDURE — 36415 COLL VENOUS BLD VENIPUNCTURE: CPT

## 2020-02-18 PROCEDURE — 80053 COMPREHEN METABOLIC PANEL: CPT

## 2020-02-18 PROCEDURE — 86706 HEP B SURFACE ANTIBODY: CPT

## 2020-02-18 PROCEDURE — A9270 NON-COVERED ITEM OR SERVICE: HCPCS | Performed by: STUDENT IN AN ORGANIZED HEALTH CARE EDUCATION/TRAINING PROGRAM

## 2020-02-18 PROCEDURE — 700111 HCHG RX REV CODE 636 W/ 250 OVERRIDE (IP): Performed by: INTERNAL MEDICINE

## 2020-02-18 PROCEDURE — 770006 HCHG ROOM/CARE - MED/SURG/GYN SEMI*

## 2020-02-18 PROCEDURE — 5A1D70Z PERFORMANCE OF URINARY FILTRATION, INTERMITTENT, LESS THAN 6 HOURS PER DAY: ICD-10-PCS | Performed by: INTERNAL MEDICINE

## 2020-02-18 PROCEDURE — 83550 IRON BINDING TEST: CPT

## 2020-02-18 PROCEDURE — 700102 HCHG RX REV CODE 250 W/ 637 OVERRIDE(OP): Performed by: STUDENT IN AN ORGANIZED HEALTH CARE EDUCATION/TRAINING PROGRAM

## 2020-02-18 PROCEDURE — 85025 COMPLETE CBC W/AUTO DIFF WBC: CPT

## 2020-02-18 PROCEDURE — 90935 HEMODIALYSIS ONE EVALUATION: CPT

## 2020-02-18 PROCEDURE — 80074 ACUTE HEPATITIS PANEL: CPT

## 2020-02-18 PROCEDURE — 84484 ASSAY OF TROPONIN QUANT: CPT

## 2020-02-18 RX ORDER — DIPHENHYDRAMINE HCL 25 MG
25 TABLET ORAL EVERY 6 HOURS PRN
Status: DISCONTINUED | OUTPATIENT
Start: 2020-02-18 | End: 2020-02-19 | Stop reason: HOSPADM

## 2020-02-18 RX ORDER — HEPARIN SODIUM 1000 [USP'U]/ML
INJECTION, SOLUTION INTRAVENOUS; SUBCUTANEOUS
Status: DISPENSED
Start: 2020-02-18 | End: 2020-02-19

## 2020-02-18 RX ORDER — ATORVASTATIN CALCIUM 40 MG/1
40 TABLET, FILM COATED ORAL
Status: DISCONTINUED | OUTPATIENT
Start: 2020-02-18 | End: 2020-02-19 | Stop reason: HOSPADM

## 2020-02-18 RX ORDER — HEPARIN SODIUM 1000 [USP'U]/ML
3700 INJECTION, SOLUTION INTRAVENOUS; SUBCUTANEOUS
Status: DISCONTINUED | OUTPATIENT
Start: 2020-02-18 | End: 2020-02-19 | Stop reason: HOSPADM

## 2020-02-18 RX ORDER — HEPARIN SODIUM 1000 [USP'U]/ML
2000 INJECTION, SOLUTION INTRAVENOUS; SUBCUTANEOUS
Status: DISCONTINUED | OUTPATIENT
Start: 2020-02-18 | End: 2020-02-18

## 2020-02-18 RX ORDER — CARVEDILOL 3.12 MG/1
3.12 TABLET ORAL 2 TIMES DAILY WITH MEALS
Status: DISCONTINUED | OUTPATIENT
Start: 2020-02-18 | End: 2020-02-19 | Stop reason: HOSPADM

## 2020-02-18 RX ORDER — HEPARIN SODIUM 1000 [USP'U]/ML
2000 INJECTION, SOLUTION INTRAVENOUS; SUBCUTANEOUS
Status: COMPLETED | OUTPATIENT
Start: 2020-02-18 | End: 2020-02-18

## 2020-02-18 RX ORDER — METHIMAZOLE 5 MG/1
5 TABLET ORAL 2 TIMES DAILY
Status: DISCONTINUED | OUTPATIENT
Start: 2020-02-18 | End: 2020-02-19 | Stop reason: HOSPADM

## 2020-02-18 RX ORDER — OMEPRAZOLE 20 MG/1
20 CAPSULE, DELAYED RELEASE ORAL
Status: DISCONTINUED | OUTPATIENT
Start: 2020-02-18 | End: 2020-02-19 | Stop reason: HOSPADM

## 2020-02-18 RX ORDER — CALCIUM ACETATE 667 MG/1
1334 TABLET ORAL
Status: DISCONTINUED | OUTPATIENT
Start: 2020-02-18 | End: 2020-02-19 | Stop reason: HOSPADM

## 2020-02-18 RX ORDER — CLOPIDOGREL BISULFATE 75 MG/1
75 TABLET ORAL DAILY
Status: DISCONTINUED | OUTPATIENT
Start: 2020-02-18 | End: 2020-02-19 | Stop reason: HOSPADM

## 2020-02-18 RX ADMIN — Medication 1334 MG: at 12:45

## 2020-02-18 RX ADMIN — CARVEDILOL 3.12 MG: 3.12 TABLET, FILM COATED ORAL at 17:43

## 2020-02-18 RX ADMIN — OMEPRAZOLE 20 MG: 20 CAPSULE, DELAYED RELEASE ORAL at 20:22

## 2020-02-18 RX ADMIN — APIXABAN 2.5 MG: 2.5 TABLET, FILM COATED ORAL at 17:43

## 2020-02-18 RX ADMIN — Medication 1334 MG: at 17:43

## 2020-02-18 RX ADMIN — HEPARIN SODIUM 3700 UNITS: 1000 INJECTION, SOLUTION INTRAVENOUS; SUBCUTANEOUS at 17:05

## 2020-02-18 RX ADMIN — Medication 1334 MG: at 05:23

## 2020-02-18 RX ADMIN — APIXABAN 2.5 MG: 2.5 TABLET, FILM COATED ORAL at 05:22

## 2020-02-18 RX ADMIN — CLOPIDOGREL BISULFATE 75 MG: 75 TABLET ORAL at 05:23

## 2020-02-18 RX ADMIN — CARVEDILOL 3.12 MG: 3.12 TABLET, FILM COATED ORAL at 05:23

## 2020-02-18 RX ADMIN — HEPARIN SODIUM 2000 UNITS: 1000 INJECTION, SOLUTION INTRAVENOUS; SUBCUTANEOUS at 13:50

## 2020-02-18 RX ADMIN — DIPHENHYDRAMINE HYDROCHLORIDE 25 MG: 25 TABLET ORAL at 09:06

## 2020-02-18 RX ADMIN — DIPHENHYDRAMINE HYDROCHLORIDE 25 MG: 25 TABLET ORAL at 19:32

## 2020-02-18 RX ADMIN — ATORVASTATIN CALCIUM 40 MG: 40 TABLET, FILM COATED ORAL at 20:22

## 2020-02-18 RX ADMIN — METHIMAZOLE 5 MG: 5 TABLET ORAL at 06:14

## 2020-02-18 RX ADMIN — METHIMAZOLE 5 MG: 5 TABLET ORAL at 17:44

## 2020-02-18 ASSESSMENT — ENCOUNTER SYMPTOMS
BLURRED VISION: 0
DOUBLE VISION: 0
VOMITING: 0
PALPITATIONS: 0
HEMOPTYSIS: 0
PHOTOPHOBIA: 0
CLAUDICATION: 0
NAUSEA: 0
SPEECH CHANGE: 0
COUGH: 0
SHORTNESS OF BREATH: 1
EYE PAIN: 0
WEIGHT LOSS: 0
DIZZINESS: 0
LOSS OF CONSCIOUSNESS: 0
SENSORY CHANGE: 0
FALLS: 0
BLOOD IN STOOL: 0
ABDOMINAL PAIN: 0
MEMORY LOSS: 0
DEPRESSION: 0
WEAKNESS: 0
DIAPHORESIS: 0
FEVER: 0
SPUTUM PRODUCTION: 0
FOCAL WEAKNESS: 0
SHORTNESS OF BREATH: 0
SINUS PAIN: 0
SEIZURES: 0
HEADACHES: 0
CHILLS: 0
ORTHOPNEA: 0
SORE THROAT: 0
HEARTBURN: 0
CONSTIPATION: 0
NECK PAIN: 0
WHEEZING: 0
PSYCHIATRIC NEGATIVE: 1
PND: 0
MYALGIAS: 0

## 2020-02-18 ASSESSMENT — LIFESTYLE VARIABLES
EVER HAD A DRINK FIRST THING IN THE MORNING TO STEADY YOUR NERVES TO GET RID OF A HANGOVER: NO
AVERAGE NUMBER OF DAYS PER WEEK YOU HAVE A DRINK CONTAINING ALCOHOL: 0
CONSUMPTION TOTAL: NEGATIVE
HAVE YOU EVER FELT YOU SHOULD CUT DOWN ON YOUR DRINKING: NO
TOTAL SCORE: 0
HAVE PEOPLE ANNOYED YOU BY CRITICIZING YOUR DRINKING: NO
HOW MANY TIMES IN THE PAST YEAR HAVE YOU HAD 5 OR MORE DRINKS IN A DAY: 0
ON A TYPICAL DAY WHEN YOU DRINK ALCOHOL HOW MANY DRINKS DO YOU HAVE: 0
EVER FELT BAD OR GUILTY ABOUT YOUR DRINKING: NO
TOTAL SCORE: 0
ALCOHOL_USE: NO
TOTAL SCORE: 0
EVER_SMOKED: YES

## 2020-02-18 NOTE — THERAPY
Pt at dialysis. Pt on renal diet and thin liquids per nurs with pending esophagram orders. D/w possible bedside swallow in AM as appropriate. Per xray studies, pt with C3-4 osteophyte resulting in contour irregularity of the retrophayngeal soft tissue.Shamir

## 2020-02-18 NOTE — ASSESSMENT & PLAN NOTE
-Resolved  -Patient complained of shortness of breath after having difficulty in swallowing yesterday; was not hypoxic on arrival  -Saturation was above 90% on room air after admission  -Cardiopulmonary examination unremarkable, no signs of volume overload.   -CXR showed 'Interstitial left pulmonary opacities suggests edema or infiltrates.'  Which were present before  -Continue to monitor

## 2020-02-18 NOTE — H&P
"History & Physical Note    Date of Admission: 2/17/2020  Admission Status: Inpatient  [facility] Team: [facility] Green Team  Attending: BEAU Clay   Senior Resident: Dr. Peck  Intern: Dr. Pablo  Contact Number: [phone]    Chief Complaint:   Shortness of breath , throat \"tickle\"     History of Present Illness (HPI):   Luis is a 74 y.o. male with pmh of ESRD on Tu/Th/Sa HD, CAD s/p stents in Proximal and mid LAD , HTN , Type 2 Diabetes Mellitus , Peripheral Neuropathy  who presented 2/17/2020 with shortness of breath and \"tickle\" in the back of his throat.    Patient states this started around 5 pm today when he checked his SpO2 on home  Pulse-ox monitor. He states it showed some erratic wave pattern and because of his respiratory discomfort his ex-wife prompted him to go to the ER. 1-2 hrs before, he was rearranging some furniture in his room but denies feeling SOB or having any chest pain at that time. Patient states his shortness of breath is attributed to the \"tickle in the back of his throat\" which is causing him discomfort and subsequent air hunger. He denies any exacerbating factors , mild relief with sips of water. He  States it feels like there is something stuck at the back of his throat and is causing discomfort swallowing and breathing. Reports of chest pressure initially with the symptoms which lasted 1-2 mins , non radiating , non exertional , w/o aggravating and relieving factors.   Denies pain swallowing , food impaction. He also denies , episode of nausea / vomiting ,diaphoresis, wheezing, pre syncopy or syncopy, sick contacts, recent travel, immobilization.     Patient follows up with Dr. Tomlin (Nephrology) and has Tue/Thu/Sat Hemodialysis. Currently has right perm cath and is following up with vasular surgery for left AV fistula 3/2/2020      ER COURSE:  Patient remained hemodynamically stable except BP ranged from 108-165/61-84. SpO2 > 90's on RA  Labs showed Hb 8.5, HCT 25.3, MCV 96 , K 3.4 , " Anion Gap 16, BUN 51, Cr 5.16, , Trop T 123.  CXR: No acute cardiopulmonary events reported.   CT soft tissue neck w/o acute soft tissue changes.bulky anterior osteophyte at C3/C4      Review of Systems:    Review of Systems   Constitutional: Negative for chills, diaphoresis, fever, malaise/fatigue and weight loss.   HENT: Negative for congestion, ear discharge, ear pain and sinus pain.    Eyes: Negative for blurred vision, double vision and pain.   Respiratory: Positive for shortness of breath. Negative for cough, hemoptysis, sputum production and wheezing.    Cardiovascular: Negative for chest pain, palpitations, orthopnea, claudication and leg swelling.   Gastrointestinal: Negative for abdominal pain, constipation, nausea and vomiting.   Genitourinary: Negative for dysuria, frequency, hematuria and urgency.   Musculoskeletal: Negative for falls and neck pain.   Skin: Negative for rash.   Neurological: Negative for dizziness, sensory change, speech change, focal weakness, loss of consciousness, weakness and headaches.   Psychiatric/Behavioral: Negative.          Past Medical History:   Past Medical History was reviewed with patient.   has a past medical history of CAD (coronary artery disease), Chronic kidney disease (CKD), stage V (HCC), Diabetes, Hypertension, Osteoarthritis, and Peripheral neuropathy.    Past Surgical History: Past Surgical History was reviewed with patient.   has a past surgical history that includes appendectomy; other orthopedic surgery; and other cardiac surgery.    Medications: Medications have been reviewed with patient.  Prior to Admission Medications   Prescriptions Last Dose Informant Patient Reported? Taking?   apixaban (ELIQUIS) 2.5mg Tab 2/17/2020 at am  Yes Yes   Sig: Take 2.5 mg by mouth 2 Times a Day.   atorvastatin (LIPITOR) 40 MG Tab 2/16/2020 at Unknown time Patient No No   Sig: Take 1 Tab by mouth every bedtime.   calcium acetate (PHOS-LO) 667 MG Cap 2/17/2020 at am  Patient Yes No   Sig: Take 1,334 mg by mouth 3 times a day, with meals.   carvedilol (COREG) 3.125 MG Tab 2/17/2020 at am  Yes Yes   Sig: Take 3.125 mg by mouth 2 times a day, with meals.   clopidogrel (PLAVIX) 75 MG Tab 2/16/2020 at 2100  No No   Sig: Take 1 Tab by mouth every day.   diphenhydrAMINE (BENADRYL) 25 MG Tab unknown at Unknown time Patient Yes No   Sig: Take 25 mg by mouth every 6 hours as needed for Itching.   insulin glargine (LANTUS SOLOSTAR) 100 UNIT/ML Solution Pen-injector injection 2/16/2020 at Unknown time  Yes Yes   Sig: Inject 5 Units as instructed every bedtime.   methimazole (TAPAZOLE) 5 MG Tab 2/17/2020 at am  No No   Sig: Take 1 Tab by mouth 2 Times a Day.   pantoprazole (PROTONIX) 40 MG Tablet Delayed Response 2/16/2020 at Unknown time Patient Yes No   Sig: Take 40 mg by mouth every bedtime.      Facility-Administered Medications: None        Allergies: Allergies have been reviewed with patient.  No Known Allergies    Family History: Reviewed  family history includes Alcohol/Drug in his father; Diabetes in his brother; Heart Disease in his mother; Thyroid in his son.     Social History:   Tobacco: Quit 10 years ago. Smoked for 60 years  Alcohol: Denies   Recreational drugs (illegal and prescription):  Denies   Employment: Denies   Activity Level: Ambulatory  Living situation:  Lives with his Ex-Wife in [facility]  Recent travel:  none  Primary Care Provider: reviewed Patito Edgar M.D.  Physical Exam:   Vitals:  Temp:  [36.6 °C (97.9 °F)] 36.6 °C (97.9 °F)  Pulse:  [87-88] 87  Resp:  [18-23] 23  BP: (108-165)/(61-72) 165/72  SpO2:  [96 %-100 %] 100 %    Physical Exam  Vitals signs and nursing note reviewed.   Constitutional:       General: He is not in acute distress.     Appearance: He is not toxic-appearing.      Comments: Elderly  male sitting up in his bed in [facility]   HENT:      Head: Normocephalic and atraumatic.      Right Ear: External ear normal.      Left Ear:  External ear normal.      Nose: Nose normal. No congestion.      Mouth/Throat:      Mouth: Mucous membranes are moist.      Pharynx: Oropharynx is clear.   Eyes:      Extraocular Movements: Extraocular movements intact.      Conjunctiva/sclera: Conjunctivae normal.      Pupils: Pupils are equal, round, and reactive to light.   Neck:      Musculoskeletal: Normal range of motion. No neck rigidity.   Cardiovascular:      Rate and Rhythm: Normal rate and regular rhythm.      Pulses: Normal pulses.      Heart sounds: No murmur.   Pulmonary:      Effort: Pulmonary effort is normal. No respiratory distress.      Breath sounds: Normal breath sounds. No stridor. No wheezing, rhonchi or rales.   Abdominal:      General: There is no distension.      Palpations: Abdomen is soft.      Tenderness: There is no abdominal tenderness. There is no guarding.   Musculoskeletal: Normal range of motion.         General: No swelling or deformity.      Right lower leg: No edema.      Left lower leg: No edema.   Skin:     Coloration: Skin is not jaundiced.      Findings: No bruising.   Neurological:      General: No focal deficit present.      Mental Status: He is alert and oriented to person, place, and time.      Cranial Nerves: No cranial nerve deficit.      Motor: No weakness.   Psychiatric:         Mood and Affect: Mood normal.         Previous Data Review: reviewed    * Shortness of breath- (present on admission)  Assessment & Plan  74 year old male with pmh of CAD s/p stent , ESRD on HD , T2DM , essential HTN presents with intermittent SOB. Per patient his SOB not related to exertion.  He did have chest pressure initially with SOB that lasted about 1-2 minutes, nonradiating, w/o exacerbating or relieving factors.  In the ER patient was noted to be saturating greater than 90% on room air.  Attributes his shortness of breath to being anxious because of his throat discomfort.  Given patient's cardiac history  will admit the patient for  observation overnight to rule out any cardiopulmonary etiology of the shortness of breath and will need speech and swallow eval.    Plan:  -Admit patient on telemetry monitoring  -Continuous pulse ox monitoring  -O2 supplementation PRN  -EKG and serial troponins.  -Speech and swallow eval in morning     Paroxysmal A-fib (HCC)- (present on admission)  Assessment & Plan  -Continue Apixaban and carvedilol    End stage renal disease on dialysis (HCC)- (present on admission)  Assessment & Plan  On Tue/Thu/Sat HD schedule.  Follows up with Dr. Tomlin Nephrology.  -No symptoms of uremia or volume overload. Ctm   -Cr 5.16 , BUN 51, K 3.4  -HD tomorrow  -Avoid nephrotoxins  -Renal dosing of medications     Anemia of chronic disease- (present on admission)  Assessment & Plan  Normocytic anemia . Hb 8.5 , Hct 25.3  -Gets epogen with HD  -Trend H&H     CAD (coronary artery disease)- (present on admission)  Assessment & Plan  Continue ASA 81 mg, Atorvastatin 40 mg daily , Clopidogrel 75 mg daily    Dyslipidemia- (present on admission)  Assessment & Plan  -Continue Atorvastatin 40 mg daily    Hyperthyroidism- (present on admission)  Assessment & Plan  -Continue Methimazole 5 mg BID

## 2020-02-18 NOTE — CONSULTS
DATE OF SERVICE:  02/18/2020    REASON FOR CONSULTATION:  End-stage renal disease, anemia, dysphagia.    HISTORY OF PRESENT ILLNESS:  The patient is a 74-year-old gentleman with   end-stage renal disease who dialyzes Tuesday, Thursday, and Saturday.  He has   additional past medical history of hyperparathyroidism, coronary artery   disease status post stenting, atrial fibrillation, hypertension, and diabetes,   who presented with problems swallowing.  The patient reports that starting at   4:30-11:30 last night, it was difficult for him to clear secretions.  This   was associated with some shortness of breath.  It did not radiate towards his   neck.    The patient had admission in November where he had a CT scan of his neck,   which did not show any carotid plaques.  His CT scan today did not show any   significant soft tissue abnormalities, it did show abnormality at C3-C4.    Currently, the patient denies any dysphagia and he is now back to baseline.    PAST MEDICAL HISTORY:  Reviewed in the HPI.    SOCIAL HISTORY:  The patient is .  He quit smoking and drinking   approximately 10 years ago.  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    FAMILY HISTORY:  Significant for alcohol use and coronary artery disease.    MEDICATIONS:  Include the following:  Eliquis, atorvastatin, calcium acetate,   carvedilol, clopidogrel, Epogen, Tapazole, omeprazole.    REVIEW OF SYSTEMS:  GENERAL:  The patient reports his weight has been stable.  HEENT:  He denies difficulty seeing or hearing.  He has had problems   swallowing, which is now resolved.  CARDIOVASCULAR:  He has had no chest pain or palpitations.  PULMONARY:  He denies any shortness of breath.  GASTROINTESTINAL:  He has had no diarrhea or constipation.  GENITOURINARY:  He still is urinating.  EXTREMITIES:  He has no lower extremity swelling.  He does not have a fistula   and is scheduled for 03/02 of this year.    PHYSICAL EXAMINATION:  VITAL  SIGNS:  His heart rate has been in the 80s.  He is afebrile.  His   respirations are 16.  His blood pressure is 100s-160s/70s-80s.  GENERAL:  He is lying in bed, in no acute distress.  HEENT:  His extraocular muscles are intact.  His sclerae are anicteric.  His   oropharynx is clear.  NECK:  Supple.  He has no JVP.  He has a right IJ tunnel cath.  HEART:  Regular rate, S1, S2 with a 2-3/6 systolic murmur.  LUNGS:  Clear throughout.  No focal consolidation.  ABDOMEN:  Soft and nontender.  EXTREMITIES:  He has no lower extremity swelling.  SKIN:  Exam is negative for maculopapular or purpuric rash.  NEUROLOGIC:  Exam is grossly nonfocal.    LABORATORY DATA:  As follows:  His white blood cell count is 6.5, his   hemoglobin is 8, his platelet count is 180.  Sodium 140, potassium 3.5,   chloride 103, CO2 is 22, glucose 104, BUN is 50, creatinine 5.24, calcium is   9.  LFTs are normal.  Albumin is low at 3.8.    IMAGING:  His x-ray of the neck showed unremarkable soft tissue with a bulky   osteophyte at C3-C4.    CONCLUSION:  The patient is a 74-year-old gentleman who was admitted with   dysphagia.  1.  Dysphagia.  He had a recent vascular study of his neck, which did not show   any significant carotid disease.  He has a swallow study pending at this   time.  2.  Anemia, likely related to his chronic kidney disease.  We will check iron   studies and continue epo.  3.  End-stage renal disease.  The patient has a tunneled catheter and will get   dialysis today.  4.  Coronary artery disease.  5.  Atrial fibrillation.  6.  Diabetes.  7.  Hyperthyroidism.    PLAN:  At this point is to get a swallow eval, dialysis today, Epogen.  Check   iron studies.  The patient is also on a significant amount of blood thinners,   question occult blood loss from the GI tract, potential fecal occult stools.    We would defer to primary.    Thank you very much for the consultation.       ____________________________________     ANG FREGOSO  MD ASHA    RFLISSA / NTS    DD:  02/18/2020 12:11:50  DT:  02/18/2020 13:23:18    D#:  [id number]  Job#:  [id number]    cc: Harris Clay MD

## 2020-02-18 NOTE — RESPIRATORY CARE
COPD EDUCATION by COPD CLINICAL EDUCATOR  2/18/2020 at 7:21 AM by Yecenia Aranda, RRT     Patient reviewed by COPD education team. Patient does not have a history or diagnosis of COPD and is a non-smoker, therefore does not qualify for the COPD program.

## 2020-02-18 NOTE — ASSESSMENT & PLAN NOTE
-Patient has a history of end-stage renal disease, on hemodialysis (on Tue/Thu/Sat)  -Follows up with Dr. Tomlin Nephrology.  -No symptoms of uremia or volume overload.  -Cr 5.16 , BUN 51, K 3.4 on admission  -Nephrology consulted, appreciate recommendations  -Continue Epogen and hemodialysis while inpatient

## 2020-02-18 NOTE — ED NOTES
Pt c/o back itching after lying on EMS paper sheet.  Back is red even after being off of it for a while.  MD made aware.

## 2020-02-18 NOTE — ASSESSMENT & PLAN NOTE
-Patient has a history of atrial fibrillation   -EKG on admission showed sinus rhythm with RBBB; no atrial fibrillation   -CHDVASc2 score is 5 ; candidate for OAC   -continue Apixaban and carvedilol

## 2020-02-18 NOTE — SENIOR ADMIT NOTE
[facility] [place] [place]IOR [place]:    Patient ID:   Name:             Luis Sepulveda   YOB: 1945  Age:                 74 y.o.  male   MRN:               [id number]                                                          Chief Complaint:       Throat discomfort with associated shortness of breath    History of Present Illness:    Mr. Sepulveda is a 74 y.o. male with a PMHx of significant for end-stage renal disease, on hemodialysis with a history of diabetes mellitus type 2, hypertension, history of A. fib on Eliquis and metoprolol history of CAD with 90% OM stenosis and 50% posterior descending artery stenosis on max medical management who presents to the emergency department at [facility] with a sensation of throat discomfort which was associated with shortness of breath which started about 4 hours prior to presentation.  Patient denies any nausea, vomiting, regurgitation, dysphagia to solids or liquids  Neck x-rays done at the time of presentation show unremarkable soft tissue examination with confluent anterior osteophytes of the anterior cervical spine with bulky osteophyte at C3/C4 resulting in a contour irregularity of the retropharyngeal soft tissue  X-ray of the chest revealed interstitial left pulmonary opacities suggesting edema or infiltrate .  ROS: Positive for throat discomfort with shortness of breath when attempted swallowing.  Denies any dysphagia to solids or liquids  EX: Air entry decreased bilaterally with no evidence of wheeze or crepitations, patient denies any chest pain or palpitation,  LABS:   Results for orders placed or performed during the hospital encounter of 02/17/20   CBC w/ Differential   Result Value Ref Range    WBC 6.8 4.8 - 10.8 K/uL    RBC 2.61 (L) 4.70 - 6.10 M/uL    Hemoglobin 8.5 (L) 14.0 - 18.0 g/dL    Hematocrit 25.3 (L) 42.0 - 52.0 %    MCV 96.9 81.4 - 97.8 fL    MCH 32.6 27.0 - 33.0 pg    MCHC 33.6 (L) 33.7 - 35.3 g/dL    RDW  50.4 (H) 35.9 - 50.0 fL    Platelet Count 188 164 - 446 K/uL    MPV 9.2 9.0 - 12.9 fL    Neutrophils-Polys 72.80 (H) 44.00 - 72.00 %    Lymphocytes 18.30 (L) 22.00 - 41.00 %    Monocytes 8.40 0.00 - 13.40 %    Eosinophils 0.00 0.00 - 6.90 %    Basophils 0.10 0.00 - 1.80 %    Immature Granulocytes 0.40 0.00 - 0.90 %    Nucleated RBC 0.00 /100 WBC    Neutrophils (Absolute) 4.93 1.82 - 7.42 K/uL    Lymphs (Absolute) 1.24 1.00 - 4.80 K/uL    Monos (Absolute) 0.57 0.00 - 0.85 K/uL    Eos (Absolute) 0.00 0.00 - 0.51 K/uL    Baso (Absolute) 0.01 0.00 - 0.12 K/uL    Immature Granulocytes (abs) 0.03 0.00 - 0.11 K/uL    NRBC (Absolute) 0.00 K/uL   Complete Metabolic Panel (CMP)   Result Value Ref Range    Sodium 138 135 - 145 mmol/L    Potassium 3.4 (L) 3.6 - 5.5 mmol/L    Chloride 102 96 - 112 mmol/L    Co2 20 20 - 33 mmol/L    Anion Gap 16.0 (H) 0.0 - 11.9    Glucose 120 (H) 65 - 99 mg/dL    Bun 51 (H) 8 - 22 mg/dL    Creatinine 5.16 (HH) 0.50 - 1.40 mg/dL    Calcium 9.2 8.5 - 10.5 mg/dL    AST(SGOT) 19 12 - 45 U/L    ALT(SGPT) 16 2 - 50 U/L    Alkaline Phosphatase 103 (H) 30 - 99 U/L    Total Bilirubin 0.7 0.1 - 1.5 mg/dL    Albumin 3.9 3.2 - 4.9 g/dL    Total Protein 7.2 6.0 - 8.2 g/dL    Globulin 3.3 1.9 - 3.5 g/dL    A-G Ratio 1.2 g/dL   Troponin STAT   Result Value Ref Range    Troponin T 123 (H) 6 - 19 ng/L   ESTIMATED GFR   Result Value Ref Range    GFR If  13 (A) >60 mL/min/1.73 m 2    GFR If Non African American 11 (A) >60 mL/min/1.73 m 2   EKG   Result Value Ref Range    Report       [facility] Emergency Dept.    Test Date:  2020  Pt Name:    KLARISSA BRADSHAW              Department: ER  MRN:        [id number]                      Room:       [facility]  Gender:     Male                         Technician: [id number]  :        1945                   Requested By:JOHN SAUNDERS  Order #:    [id number]                    Reading MD:    Measurements  Intervals                                 Axis  Rate:       85                           P:          88  IN:         192                          QRS:        -62  QRSD:       148                          T:          80  QT:         423  QTc:        503    Interpretive Statements  Sinus rhythm  RBBB and LAFB  Baseline wander in lead(s) III  Compared to ECG 12/10/2019 12:42:53  Sinus tachycardia no longer present       IMAGING:   DX-NECK FOR SOFT TISSUE   Final Result         1.  Unremarkable soft tissue examination of the neck.   2.  Confluent anterior osteophytes of the anterior cervical spine with bulky osteophyte at C3/C4 resulting in contour irregularity of the retropharyngeal soft tissues.      DX-CHEST-PORTABLE (1 VIEW)   Final Result         1.  Interstitial left pulmonary opacities suggests edema or infiltrates, similar to prior study. Differential would include chronic underlying lung disease.   2.  Atherosclerosis          Active Ambulatory Problems     Diagnosis Date Noted   • Polyarthritis 06/25/2018   • Hyperthyroidism 06/25/2018   • CAD (coronary artery disease) 06/25/2018   • Hepatic cyst 08/02/2018   • Renal cyst 08/02/2018   • Essential hypertension 08/15/2018   • Gastroesophageal reflux disease 08/15/2018   • Anemia of chronic disease 08/15/2018   • Health care maintenance 08/15/2018   • Dyslipidemia 04/11/2019   • Thyroid nodule 04/11/2019   • Diabetes mellitus type 2, controlled, with complications ([facility]) 08/13/2019   • End stage renal disease on dialysis ([facility]) 08/13/2019   • NSTEMI (non-ST elevated myocardial infarction) ([facility]) 11/11/2019   • Secondary hyperparathyroidism of renal origin ([facility]) 11/15/2019   • Paroxysmal A-fib ([facility]) 11/16/2019   • Acute metabolic encephalopathy 11/22/2019   • Elevated troponin 11/28/2019   • Hypotension due to hypovolemia 12/02/2019     Resolved Ambulatory Problems     Diagnosis Date Noted   • Pulmonary edema 01/24/2014   • COPD exacerbation ([facility]) 01/24/2014   • Type 2 diabetes mellitus, with  "long-term current use of insulin (HCC) 06/25/2018   • Hypovolemic hyponatremia 06/25/2018   • Acute renal failure superimposed on stage 4 chronic kidney disease (HCC) 06/25/2018   • HTN (hypertension) 06/25/2018   • Hypotension 11/28/2019   • Elevated lactic acid level 11/28/2019   • Pneumonia 11/28/2019   • Palpitations 12/10/2019     Past Medical History:   Diagnosis Date   • Chronic kidney disease (CKD), stage V (HCC)    • Diabetes    • Hypertension    • Osteoarthritis    • Peripheral neuropathy        PHYSICAL EXAM  Vitals:   Weight/BMI: Body mass index is 24.43 kg/m².  BP (!) 164/100   Pulse 96   Temp 36.6 °C (97.9 °F) (Oral)   Resp 20   Ht 1.702 m (5' 7\")   Wt 70.8 kg (156 lb)   SpO2 97%   Vitals:    02/17/20 1926 02/17/20 1928 02/17/20 2204 02/17/20 2301   BP:  108/61 (!) 165/72 (!) 164/100   Pulse:  88 87 96   Resp:  18 (!) 23 20   Temp:  36.6 °C (97.9 °F)     TempSrc:  Oral     SpO2:  96% 100% 97%   Weight: 70.8 kg (156 lb)      Height: 1.702 m (5' 7\")        Oxygen Therapy:  Pulse Oximetry: 97 %      IMPRESSION  #Chest Discomfort/esophagitis  ESRD awaiting hemodialysis on 2/18/2020, atrial fibrillation on Eliquis and metoprolol CAD on max medical management.  Patient has discomfort in the throat associated with shortness of breath while trying to alleviate his discomfort which has resolved on observation in the emergency department when seen by the medical team.  Patient will be monitored overnight for possible esophageal scoping, swallow and speech eval in the a.m. to evaluate the discomfort in the throat.  Cervical spine x-ray shows a bulky anterior osteophyte at C3/C4 which could be causing mild discomfort on deglutition.    PLAN:  -Admit on medical floor.  -Evaluate this am by day team, for need for esophagoscopy to evaluate discomfort or discharge after swallow eval with ENT Follow up as an outpatient.  -[facility] as outpatient on 2/18/20 or will need inpatient " dialysis by . of nephrology.  -Continue home medications for A. fib ,CAD and thyroid dysfunction    Please refer to Interns Dr. Chris Saunders H&P for complete admission details.

## 2020-02-18 NOTE — PROGRESS NOTES
Daily Progress Note:     Date of Service: 2/18/2020  Primary Team: [facility] IM Green Team   Attending: Harris Clay M.D.   Senior Resident: Dr. Peck  Intern: Dr. Pablo  Contact:  [phone]    Chief Complaint:   Difficulty in swallowing    Subjective:  Mr. Sepulveda is a 74-year-old male with PMHx of ESRD (on hemodialysis), CAD status post stents in proximal and mid LAD, IDDM, atrial fibrillation, hypothyroidism and GERD who has been admitted for the evaluation of difficulty in swallowing.    -No acute events overnight, patient was afebrile  -Today morning, he stated that he had difficulty in swallowing yesterday along with shortness of breath which gradually resolved and currently he is able to take ice chips and drink water without any difficulty; denied any other symptoms  -On exam, patient did not have any tenderness or swelling around neck; oropharynx clear without erythema or exudates; unremarkable abdominal examination  -X-ray soft tissue neck showed 'Confluent anterior osteophytes of the anterior cervical spine with bulky osteophyte at C3/C4 resulting in contour irregularity of the retropharyngeal soft tissues.'  -After bedside swallow evaluation, diet was resumed  -Vital signs are within normal limits.  Saturation was about 90% on room air  -CBC showed anemia which is likely secondary to ESRD  -Nephrology consulted, appreciate recommendations  -Patient will have inpatient hemodialysis today   -He will be evaluated with esophagogram/barium swallow for dysphagia      Consultants/Specialty:  Nephrology    Review of Systems:    Review of Systems   Constitutional: Negative for chills, fever and weight loss.   HENT: Negative for congestion, ear pain, nosebleeds and sore throat.    Eyes: Negative for blurred vision, double vision, photophobia and pain.   Respiratory: Negative for cough, hemoptysis, shortness of breath and wheezing.    Cardiovascular: Negative for chest pain, palpitations, leg swelling and PND.    Gastrointestinal: Negative for abdominal pain, blood in stool, heartburn, nausea and vomiting.   Genitourinary: Negative for hematuria and urgency.   Musculoskeletal: Negative for joint pain and myalgias.   Skin: Negative for itching and rash.   Neurological: Negative for dizziness, sensory change, speech change, focal weakness, seizures and headaches.   Psychiatric/Behavioral: Negative for depression and memory loss.       Objective Data:   Physical Exam:   Vitals:   Temp:  [36.3 °C (97.3 °F)-36.7 °C (98.1 °F)] 36.4 °C (97.5 °F)  Pulse:  [] 92  Resp:  [16-23] 17  BP: (108-165)/() 156/79  SpO2:  [96 %-100 %] 99 %       Physical Exam  Constitutional:       General: He is not in acute distress.     Appearance: Normal appearance. He is normal weight. He is not ill-appearing, toxic-appearing or diaphoretic.   HENT:      Head: Normocephalic and atraumatic.      Nose: Nose normal. No congestion or rhinorrhea.      Mouth/Throat:      Mouth: Mucous membranes are dry.      Pharynx: No oropharyngeal exudate or posterior oropharyngeal erythema.   Eyes:      Extraocular Movements: Extraocular movements intact.      Conjunctiva/sclera: Conjunctivae normal.      Pupils: Pupils are equal, round, and reactive to light.   Neck:      Musculoskeletal: Normal range of motion and neck supple. No neck rigidity or muscular tenderness.   Cardiovascular:      Rate and Rhythm: Normal rate and regular rhythm.      Heart sounds: No murmur. No friction rub. No gallop.    Pulmonary:      Effort: Pulmonary effort is normal. No respiratory distress.      Breath sounds: Normal breath sounds. No wheezing or rales.   Abdominal:      General: Abdomen is flat. There is no distension.      Tenderness: There is no abdominal tenderness. There is no guarding.   Musculoskeletal: Normal range of motion.         General: No swelling, tenderness or deformity.   Skin:     General: Skin is dry.      Coloration: Skin is not jaundiced or pale.       Findings: No bruising.   Neurological:      General: No focal deficit present.      Mental Status: He is alert and oriented to person, place, and time.   Psychiatric:         Mood and Affect: Mood normal.       Labs:     Recent Labs     02/17/20 1935 02/18/20  0301   WBC 6.8 6.5   RBC 2.61* 2.45*   HEMOGLOBIN 8.5* 8.0*   HEMATOCRIT 25.3* 24.1*   MCV 96.9 98.4*   MCH 32.6 32.7   RDW 50.4* 51.5*   PLATELETCT 188 180   MPV 9.2 9.1   NEUTSPOLYS 72.80* 69.00   LYMPHOCYTES 18.30* 20.80*   MONOCYTES 8.40 9.40   EOSINOPHILS 0.00 0.30   BASOPHILS 0.10 0.20     Recent Labs     02/17/20 1935 02/18/20  0301   SODIUM 138 140   POTASSIUM 3.4* 3.5*   CHLORIDE 102 103   CO2 20 22   GLUCOSE 120* 104*   BUN 51* 50*       Imaging:     DX-NECK FOR SOFT TISSUE   Final Result         1.  Unremarkable soft tissue examination of the neck.   2.  Confluent anterior osteophytes of the anterior cervical spine with bulky osteophyte at C3/C4 resulting in contour irregularity of the retropharyngeal soft tissues.      DX-CHEST-PORTABLE (1 VIEW)   Final Result         1.  Interstitial left pulmonary opacities suggests edema or infiltrates, similar to prior study. Differential would include chronic underlying lung disease.   2.  Atherosclerosis      DX-ESOPH. FLUORO (BARIUM SWALLOW)    (Results Pending)       Assessment and Plan:    * Dysphagia- (present on admission)  Assessment & Plan  -Resolved  -Today morning, he stated that he had difficulty in swallowing yesterday along with shortness of breath which gradually resolved and currently he is able to take ice chips and drink water without any difficulty; denied any other symptoms  -On exam, patient did not have any tenderness or swelling around neck; oropharynx clear without erythema or exudates; unremarkable abdominal examination  -X-ray soft tissue neck showed 'Confluent anterior osteophytes of the anterior cervical spine with bulky osteophyte at C3/C4 resulting in contour irregularity of the  retropharyngeal soft tissues.'  -After bedside swallow evaluation, diet was resumed  -He will be evaluated with esophagogram/barium swallow for dysphagia    Shortness of breath- (present on admission)  Assessment & Plan  -Resolved  -Patient complained of shortness of breath after having difficulty in swallowing yesterday; was not hypoxic on arrival  -Saturation was above 90% on room air after admission  -Cardiopulmonary examination unremarkable, no signs of volume overload.   -CXR showed 'Interstitial left pulmonary opacities suggests edema or infiltrates.'  Which were present before  -Continue to monitor      Paroxysmal A-fib (HCC)- (present on admission)  Assessment & Plan  -Patient has a history of atrial fibrillation   -EKG on admission showed sinus rhythm with RBBB; no atrial fibrillation   -CHDVASc2 score is 5 ; candidate for OAC   -continue Apixaban and carvedilol    End stage renal disease on dialysis (HCC)- (present on admission)  Assessment & Plan  -Patient has a history of end-stage renal disease, on hemodialysis (on Tue/Thu/Sat)  -Follows up with Dr. [facility].  -No symptoms of uremia or volume overload.  -Cr 5.16 , BUN 51, K 3.4 on admission  -Nephrology consulted, appreciate recommendations  -Continue Epogen and hemodialysis while inpatient    Anemia of chronic disease- (present on admission)  Assessment & Plan  -Hemoglobin 8.5 on admission ; normocytic anemia   -Likely secondary to end-stage renal disease   -Continue Epogen with hemodialysis         CAD (coronary artery disease)- (present on admission)  Assessment & Plan  -History of coronary artery disease status post stents in proximal and mid LAD   -Denied any cardiopulmonary symptoms after admission   -Vital signs are within normal limits   -Continue clopidogrel, statin, beta-blocker     Dyslipidemia- (present on admission)  Assessment & Plan  -Patient has a history of dyslipidemia   -continue Atorvastatin 40 mg daily    Hyperthyroidism-  (present on admission)  Assessment & Plan  -Continue Methimazole 5 mg BID

## 2020-02-18 NOTE — DISCHARGE PLANNING
Outpatient Dialysis Note    Confirmed patient is active at:    [facility]  [address]  [place] [address]       Schedule: Tuesday, Thursday, Saturday  Time: 6:15 am    Spoke with Javan at facility who confirmed.    Forwarded records for review.      Shannan Pablo- Dialysis Coordinator  [facility] [employer] # [phone]

## 2020-02-18 NOTE — CARE PLAN
Problem: Communication  Goal: The ability to communicate needs accurately and effectively will improve  Outcome: PROGRESSING AS EXPECTED  Note: Patient oriented to room and surroundings. Patient educated to communicate needs accurately and effectively. Patient encouraged to vocalize questions and concerns regarding POC. No concerns or questions noted at this time.        Problem: Safety  Goal: Will remain free from falls  Outcome: PROGRESSING AS EXPECTED  Note: Patient will remain free from falls. Patient educated on importance of calling for assistance when assistance needed. Patient educated on tread socks, belongings within reach, and use of call light. Fall risk wristband on, bed alarm on. Bed in low and locked position. Fall precautions in place.

## 2020-02-18 NOTE — ED NOTES
"Pt appearing anxious.  Pt relates trouble swallowing feeling like he has something \"stuck\".  Pts lungs and upper airway are clear.  O2 Sat 100% RA  "

## 2020-02-18 NOTE — ASSESSMENT & PLAN NOTE
-Hemoglobin 8.5 on admission ; normocytic anemia   -Likely secondary to end-stage renal disease   -Continue Epogen with hemodialysis

## 2020-02-18 NOTE — ASSESSMENT & PLAN NOTE
-Resolved  -Today morning, he stated that he had difficulty in swallowing yesterday along with shortness of breath which gradually resolved and currently he is able to take ice chips and drink water without any difficulty; denied any other symptoms  -On exam, patient did not have any tenderness or swelling around neck; oropharynx clear without erythema or exudates; unremarkable abdominal examination  -X-ray soft tissue neck showed 'Confluent anterior osteophytes of the anterior cervical spine with bulky osteophyte at C3/C4 resulting in contour irregularity of the retropharyngeal soft tissues.'  -After bedside swallow evaluation, diet was resumed  -He will be evaluated with esophagogram/barium swallow for dysphagia

## 2020-02-18 NOTE — ASSESSMENT & PLAN NOTE
-History of coronary artery disease status post stents in proximal and mid LAD   -Denied any cardiopulmonary symptoms after admission   -Vital signs are within normal limits   -Continue clopidogrel, statin, beta-blocker

## 2020-02-18 NOTE — PROGRESS NOTES
Report received from ED RN Tyler. Patient A&O x 4. Reports no pain.  VS Stable. Monitor on, monitor room notified. Patient SR. POC discussed, patient verbalized understanding. Belongings and bedside table within reach. Bed in low and locked positions. Fall precautions in place. Patient educated to call for assistance. Hourly rounding in place. No other needs at this time.

## 2020-02-18 NOTE — DISCHARGE SUMMARY
Discharge Summary    Date of Admission: 2/17/2020  Date of Discharge: 2/19/2020  Discharging Attending: Harris Clay M.D.   Discharging Senior Resident: Dr. Peck  Discharging Intern: Dr. Pablo    CHIEF COMPLAINT ON ADMISSION  Chief Complaint   Patient presents with   • Shortness of Breath       Reason for Admission  Dysphagia    Admission Date  2/17/2020    CODE STATUS  Full Code    HPI & HOSPITAL COURSE  This is a 74 y.o. male with PMHx of ESRD on TTS dialysis, CAD s/p stent to proximal and mid LAD, DM2, HTN, atrial fibrillation on apixiban, hypothyroidism, GERD and peripheral neuropathy, who was admitted for dysphagia and resulting dyspnea.   His EKG did not show any ST or T wave changes to suggest ACS, and troponin was slightly elevated, and likely related to his ESRD.  He denied having any chest pain.    X-ray of the neck showed an osteophyte at C3/C4 causing bulging in the retropharyngeal soft tissue.  Patient passed bedside swallow evaluation, and reports resolution of dysphagia symptoms.      Barium swallow study was done, and showed tertiary contractions, C3-C4 anterior osteophyte and a possible cricopharyngeal bar more inferiorly with mild mass effect/contouring. No significant narrowing, mass or stricture.  Patient was advised to follow with PCP and to consider neurosurgery consult if his dysphagia becomes bothersome.      Nephrology was consulted and hemodialysis was done as per regular schedule.         Therefore, he is discharged in good and stable condition to home with close outpatient follow-up.    The patient met 2-midnight criteria for an inpatient stay at the time of discharge.    Discharge Date  2/19/2020    FOLLOW UP ITEMS POST DISCHARGE  - dysphagia secondary to C3-C4 osteophyte    DISCHARGE DIAGNOSES  Principal Problem:    Dysphagia POA: Yes  Active Problems:    Shortness of breath POA: Yes    CAD (coronary artery disease) POA: Yes      Overview: Stents to proximal and mid LAD in  [facility].    Anemia of chronic disease POA: Yes    End stage renal disease on dialysis (HCC) POA: Yes    Paroxysmal A-fib (HCC) POA: Yes    Dyslipidemia POA: Yes    Hyperthyroidism POA: Yes  Resolved Problems:    * No resolved hospital problems. *      FOLLOW UP  Future Appointments   Date Time Provider Department Center   6/2/2020  2:20 PM Kulwant Osborn M.D. [facility] None     No follow-up provider specified.    MEDICATIONS ON DISCHARGE     Medication List      ASK your doctor about these medications      Instructions   atorvastatin 40 MG Tabs  Commonly known as:  LIPITOR   Take 1 Tab by mouth every bedtime.  Dose:  40 mg     calcium acetate 667 MG Caps  Commonly known as:  PHOS-LO   Take 1,334 mg by mouth 3 times a day, with meals.  Dose:  1,334 mg     carvedilol 3.125 MG Tabs  Commonly known as:  COREG   Take 3.125 mg by mouth 2 times a day, with meals.  Dose:  3.125 mg     clopidogrel 75 MG Tabs  Commonly known as:  PLAVIX   Take 1 Tab by mouth every day.  Dose:  75 mg     diphenhydrAMINE 25 MG Tabs  Commonly known as:  BENADRYL   Take 25 mg by mouth every 6 hours as needed for Itching.  Dose:  25 mg     Eliquis 2.5mg Tabs  Generic drug:  apixaban   Take 2.5 mg by mouth 2 Times a Day.  Dose:  2.5 mg     Lantus SoloStar 100 UNIT/ML Sopn injection  Generic drug:  insulin glargine   Inject 5 Units as instructed every bedtime.  Dose:  5 Units     methimazole 5 MG Tabs  Commonly known as:  TAPAZOLE   Take 1 Tab by mouth 2 Times a Day.  Dose:  5 mg     Protonix 40 MG Tbec  Generic drug:  pantoprazole   Take 40 mg by mouth every bedtime.  Dose:  40 mg            Allergies  No Known Allergies    DIET  Orders Placed This Encounter   Procedures   • Diet Order Renal     Standing Status:   Standing     Number of Occurrences:   1     Order Specific Question:   Diet:     Answer:   Renal [8]       ACTIVITY  As tolerated.  Weight bearing as tolerated    CONSULTATIONS  Dr. Hodges with Nephrology Service consulted.   Treatment options were discussed and plan of care agreed upon.    PROCEDURES  Dialysis

## 2020-02-18 NOTE — ED PROVIDER NOTES
"ED Provider Note    CHIEF COMPLAINT  Chief Complaint   Patient presents with   • Shortness of Breath       HPI  Luis Sepulveda is a 74 y.o. male here for evaluation of shortness of breath and chest pain.  The patient states that he has been having some intermittent shortness of breath, and a feeling as though he has phlegm in his his throat.  Patient states that his chest pain is left-sided, nonradiating, not associate with any particular event.  He reports having pain intermittently, and again does not radiate to the back or neck.  He has no vomiting, and no nausea.  The patient did not take anything prior to arrival for the same.  He is a dialysis patient, and does do this on .  He is due to have this tomorrow.  Patient states that he has no trouble swallowing, or breathing until he gets a \"phlegm\" stuck in his throat.  He denies any food impaction.      ROS  See HPI for further details, o/w negative.     PAST MEDICAL HISTORY   has a past medical history of CAD (coronary artery disease), Chronic kidney disease (CKD), stage V (HCC), Diabetes, Hypertension, Osteoarthritis, and Peripheral neuropathy.    SOCIAL HISTORY  Social History     Tobacco Use   • Smoking status: Former Smoker     Packs/day: 1.00     Years: 55.00     Pack years: 55.00     Types: Cigarettes     Last attempt to quit: 2014     Years since quittin.0   • Smokeless tobacco: Never Used   Substance and Sexual Activity   • Alcohol use: No   • Drug use: No   • Sexual activity: Not on file       Family History  No bleeding disorders    SURGICAL HISTORY   has a past surgical history that includes appendectomy; other orthopedic surgery; and other cardiac surgery.    CURRENT MEDICATIONS  Home Medications     Reviewed by Sandra Finn (Pharmacy Tech) on 20 at 2111  Med List Status: Complete   Medication Last Dose Status   apixaban (ELIQUIS) 2.5mg Tab 2020 Active   atorvastatin (LIPITOR) 40 MG Tab " 2/16/2020 Active   calcium acetate (PHOS-LO) 667 MG Cap 2/17/2020 Active   carvedilol (COREG) 3.125 MG Tab 2/17/2020 Active   clopidogrel (PLAVIX) 75 MG Tab 2/16/2020 Active   diphenhydrAMINE (BENADRYL) 25 MG Tab unknown Active   insulin glargine (LANTUS SOLOSTAR) 100 UNIT/ML Solution Pen-injector injection 2/16/2020 Active   methimazole (TAPAZOLE) 5 MG Tab 2/17/2020 Active   pantoprazole (PROTONIX) 40 MG Tablet Delayed Response 2/16/2020 Active                ALLERGIES  No Known Allergies    REVIEW OF SYSTEMS  See HPI for further details. Review of systems as above, otherwise all other systems are negative.     PHYSICAL EXAM  Constitutional: Well developed, well nourished.  Mild acute distress.  HEENT: Normocephalic, atraumatic. Posterior pharynx clear and moist.  Eyes:  EOMI. Normal sclera.  Neck: Supple, Full range of motion, nontender.  Chest/Pulmonary:   Diminished breath sounds, equal expansion  Cardio: Regular rate and rhythm with no murmur.   Abdomen: Soft, nontender. No peritoneal signs. No guarding. No palpable masses.  Musculoskeletal: No deformity, no edema, neurovascular intact.   Neuro: Clear speech, appropriate, cooperative, cranial nerves II-XII grossly intact.  Psych: Normal mood and affect      Results for orders placed or performed during the hospital encounter of 02/17/20   CBC w/ Differential   Result Value Ref Range    WBC 6.8 4.8 - 10.8 K/uL    RBC 2.61 (L) 4.70 - 6.10 M/uL    Hemoglobin 8.5 (L) 14.0 - 18.0 g/dL    Hematocrit 25.3 (L) 42.0 - 52.0 %    MCV 96.9 81.4 - 97.8 fL    MCH 32.6 27.0 - 33.0 pg    MCHC 33.6 (L) 33.7 - 35.3 g/dL    RDW 50.4 (H) 35.9 - 50.0 fL    Platelet Count 188 164 - 446 K/uL    MPV 9.2 9.0 - 12.9 fL    Neutrophils-Polys 72.80 (H) 44.00 - 72.00 %    Lymphocytes 18.30 (L) 22.00 - 41.00 %    Monocytes 8.40 0.00 - 13.40 %    Eosinophils 0.00 0.00 - 6.90 %    Basophils 0.10 0.00 - 1.80 %    Immature Granulocytes 0.40 0.00 - 0.90 %    Nucleated RBC 0.00 /100 WBC     Neutrophils (Absolute) 4.93 1.82 - 7.42 K/uL    Lymphs (Absolute) 1.24 1.00 - 4.80 K/uL    Monos (Absolute) 0.57 0.00 - 0.85 K/uL    Eos (Absolute) 0.00 0.00 - 0.51 K/uL    Baso (Absolute) 0.01 0.00 - 0.12 K/uL    Immature Granulocytes (abs) 0.03 0.00 - 0.11 K/uL    NRBC (Absolute) 0.00 K/uL   Complete Metabolic Panel (CMP)   Result Value Ref Range    Sodium 138 135 - 145 mmol/L    Potassium 3.4 (L) 3.6 - 5.5 mmol/L    Chloride 102 96 - 112 mmol/L    Co2 20 20 - 33 mmol/L    Anion Gap 16.0 (H) 0.0 - 11.9    Glucose 120 (H) 65 - 99 mg/dL    Bun 51 (H) 8 - 22 mg/dL    Creatinine 5.16 (HH) 0.50 - 1.40 mg/dL    Calcium 9.2 8.5 - 10.5 mg/dL    AST(SGOT) 19 12 - 45 U/L    ALT(SGPT) 16 2 - 50 U/L    Alkaline Phosphatase 103 (H) 30 - 99 U/L    Total Bilirubin 0.7 0.1 - 1.5 mg/dL    Albumin 3.9 3.2 - 4.9 g/dL    Total Protein 7.2 6.0 - 8.2 g/dL    Globulin 3.3 1.9 - 3.5 g/dL    A-G Ratio 1.2 g/dL   Troponin STAT   Result Value Ref Range    Troponin T 123 (H) 6 - 19 ng/L   ESTIMATED GFR   Result Value Ref Range    GFR If  13 (A) >60 mL/min/1.73 m 2    GFR If Non African American 11 (A) >60 mL/min/1.73 m 2     DX-CHEST-PORTABLE (1 VIEW)   Final Result         1.  Interstitial left pulmonary opacities suggests edema or infiltrates, similar to prior study. Differential would include chronic underlying lung disease.   2.  Atherosclerosis      DX-NECK FOR SOFT TISSUE    (Results Pending)         PROCEDURES     MEDICAL RECORD  I have reviewed patient's medical record and pertinent results are listed.    COURSE & MEDICAL DECISION MAKING  I have reviewed any medical record information, laboratory studies and radiographic results as noted above.    The pt will be admitted to [facility].      FINAL IMPRESSION  1. Chest pain, unspecified type             Electronically signed by: Rocco Lopez D.O., 2/17/2020 9:24 PM

## 2020-02-18 NOTE — CARE PLAN
Problem: Safety  Goal: Will remain free from falls  Outcome: PROGRESSING AS EXPECTED  Intervention: Implement fall precautions  Flowsheets  Taken 2/18/2020 1113  Bed Alarm: Yes - Alarm On (Pended)  Bedrails: Bedrails Closest to Bathroom Down (Pended)  Chair/Bed Strip Alarm: Yes - Alarm On (Pended)  Taken 2/18/2020 0837  Environmental Precautions:   Treaded Slipper Socks on Patient   Personal Belongings, Wastebasket, Call Bell etc. in Easy Reach   Transferred to Stronger Side   Report Given to Other Health Care Providers Regarding Fall Risk   Bed in Low Position   Communication Sign for Patients & Families   Mobility Assessed & Appropriate Sign Placed (Pended)  Note: Patient educated on the importance to call for assistance when getting out of bed to prevent falls.     Problem: Infection  Goal: Will remain free from infection  Outcome: PROGRESSING AS EXPECTED  Intervention: Implement standard precautions and perform hand washing before and after patient contact  Note: Patient educated on importance of hand hygiene to prevent infections.

## 2020-02-18 NOTE — PROGRESS NOTES
· 2 RN skin check complete with JAN Morrison.  · Devices in place PIV, tele box, HD cath  · Skin assessed under devices yes  · Confirmed pressure ulcers found on n/a  · New potential pressure ulcers noted on n/a. Wound consult placed and wound reported n/a.   · The following interventions in place: patient turns self, pillows in use for support and positioning, moisturizer at bedside    Assessment:  · All skin intact  · Bilateral heels red and blanching  · RLE big toe red and blanching  · General excoriation on BLE

## 2020-02-19 ENCOUNTER — APPOINTMENT (OUTPATIENT)
Dept: RADIOLOGY | Facility: MEDICAL CENTER | Age: 75
DRG: 551 | End: 2020-02-19
Attending: STUDENT IN AN ORGANIZED HEALTH CARE EDUCATION/TRAINING PROGRAM
Payer: MEDICARE

## 2020-02-19 VITALS
OXYGEN SATURATION: 93 % | DIASTOLIC BLOOD PRESSURE: 50 MMHG | WEIGHT: 162.7 LBS | BODY MASS INDEX: 25.54 KG/M2 | HEART RATE: 88 BPM | TEMPERATURE: 98.2 F | HEIGHT: 67 IN | RESPIRATION RATE: 18 BRPM | SYSTOLIC BLOOD PRESSURE: 112 MMHG

## 2020-02-19 LAB
ALBUMIN SERPL BCP-MCNC: 3.8 G/DL (ref 3.2–4.9)
ALBUMIN/GLOB SERPL: 1.4 G/DL
ALP SERPL-CCNC: 96 U/L (ref 30–99)
ALT SERPL-CCNC: 16 U/L (ref 2–50)
ANION GAP SERPL CALC-SCNC: 9 MMOL/L (ref 0–11.9)
AST SERPL-CCNC: 18 U/L (ref 12–45)
BASOPHILS # BLD AUTO: 0.4 % (ref 0–1.8)
BASOPHILS # BLD: 0.02 K/UL (ref 0–0.12)
BILIRUB SERPL-MCNC: 0.7 MG/DL (ref 0.1–1.5)
BUN SERPL-MCNC: 26 MG/DL (ref 8–22)
CALCIUM SERPL-MCNC: 9.1 MG/DL (ref 8.5–10.5)
CHLORIDE SERPL-SCNC: 100 MMOL/L (ref 96–112)
CO2 SERPL-SCNC: 27 MMOL/L (ref 20–33)
CREAT SERPL-MCNC: 3.3 MG/DL (ref 0.5–1.4)
EOSINOPHIL # BLD AUTO: 0.02 K/UL (ref 0–0.51)
EOSINOPHIL NFR BLD: 0.4 % (ref 0–6.9)
ERYTHROCYTE [DISTWIDTH] IN BLOOD BY AUTOMATED COUNT: 52.1 FL (ref 35.9–50)
GLOBULIN SER CALC-MCNC: 2.7 G/DL (ref 1.9–3.5)
GLUCOSE SERPL-MCNC: 138 MG/DL (ref 65–99)
HCT VFR BLD AUTO: 25.9 % (ref 42–52)
HGB BLD-MCNC: 8.4 G/DL (ref 14–18)
IMM GRANULOCYTES # BLD AUTO: 0.01 K/UL (ref 0–0.11)
IMM GRANULOCYTES NFR BLD AUTO: 0.2 % (ref 0–0.9)
LYMPHOCYTES # BLD AUTO: 1.09 K/UL (ref 1–4.8)
LYMPHOCYTES NFR BLD: 22.4 % (ref 22–41)
MAGNESIUM SERPL-MCNC: 2.1 MG/DL (ref 1.5–2.5)
MCH RBC QN AUTO: 31.9 PG (ref 27–33)
MCHC RBC AUTO-ENTMCNC: 32.4 G/DL (ref 33.7–35.3)
MCV RBC AUTO: 98.5 FL (ref 81.4–97.8)
MONOCYTES # BLD AUTO: 0.66 K/UL (ref 0–0.85)
MONOCYTES NFR BLD AUTO: 13.6 % (ref 0–13.4)
NEUTROPHILS # BLD AUTO: 3.07 K/UL (ref 1.82–7.42)
NEUTROPHILS NFR BLD: 63 % (ref 44–72)
NRBC # BLD AUTO: 0 K/UL
NRBC BLD-RTO: 0 /100 WBC
PLATELET # BLD AUTO: 202 K/UL (ref 164–446)
PMV BLD AUTO: 9 FL (ref 9–12.9)
POTASSIUM SERPL-SCNC: 3.8 MMOL/L (ref 3.6–5.5)
PROT SERPL-MCNC: 6.5 G/DL (ref 6–8.2)
RBC # BLD AUTO: 2.63 M/UL (ref 4.7–6.1)
SODIUM SERPL-SCNC: 136 MMOL/L (ref 135–145)
WBC # BLD AUTO: 4.9 K/UL (ref 4.8–10.8)

## 2020-02-19 PROCEDURE — 83735 ASSAY OF MAGNESIUM: CPT

## 2020-02-19 PROCEDURE — 700102 HCHG RX REV CODE 250 W/ 637 OVERRIDE(OP): Performed by: STUDENT IN AN ORGANIZED HEALTH CARE EDUCATION/TRAINING PROGRAM

## 2020-02-19 PROCEDURE — 700111 HCHG RX REV CODE 636 W/ 250 OVERRIDE (IP): Mod: JG | Performed by: INTERNAL MEDICINE

## 2020-02-19 PROCEDURE — 99238 HOSP IP/OBS DSCHRG MGMT 30/<: CPT | Mod: GC | Performed by: INTERNAL MEDICINE

## 2020-02-19 PROCEDURE — 92610 EVALUATE SWALLOWING FUNCTION: CPT

## 2020-02-19 PROCEDURE — 80053 COMPREHEN METABOLIC PANEL: CPT

## 2020-02-19 PROCEDURE — A9270 NON-COVERED ITEM OR SERVICE: HCPCS | Performed by: STUDENT IN AN ORGANIZED HEALTH CARE EDUCATION/TRAINING PROGRAM

## 2020-02-19 PROCEDURE — 74210 X-RAY XM PHRNX&/CRV ESOPH C+: CPT

## 2020-02-19 PROCEDURE — 36415 COLL VENOUS BLD VENIPUNCTURE: CPT

## 2020-02-19 PROCEDURE — 700112 HCHG RX REV CODE 229: Performed by: STUDENT IN AN ORGANIZED HEALTH CARE EDUCATION/TRAINING PROGRAM

## 2020-02-19 PROCEDURE — 92526 ORAL FUNCTION THERAPY: CPT

## 2020-02-19 PROCEDURE — 85025 COMPLETE CBC W/AUTO DIFF WBC: CPT

## 2020-02-19 RX ADMIN — METHIMAZOLE 5 MG: 5 TABLET ORAL at 06:19

## 2020-02-19 RX ADMIN — CLOPIDOGREL BISULFATE 75 MG: 75 TABLET ORAL at 06:19

## 2020-02-19 RX ADMIN — CARVEDILOL 3.12 MG: 3.12 TABLET, FILM COATED ORAL at 12:53

## 2020-02-19 RX ADMIN — ANTACID/ANTIFLATULENT 1 PACKET: 380; 550; 10; 10 GRANULE, EFFERVESCENT ORAL at 12:45

## 2020-02-19 RX ADMIN — APIXABAN 2.5 MG: 2.5 TABLET, FILM COATED ORAL at 06:19

## 2020-02-19 RX ADMIN — Medication 1334 MG: at 12:53

## 2020-02-19 RX ADMIN — ERYTHROPOIETIN 10000 UNITS: 10000 INJECTION, SOLUTION INTRAVENOUS; SUBCUTANEOUS at 14:46

## 2020-02-19 ASSESSMENT — ENCOUNTER SYMPTOMS
SHORTNESS OF BREATH: 0
SEIZURES: 0
NAUSEA: 0
NEUROLOGICAL NEGATIVE: 1
PHOTOPHOBIA: 0
MYALGIAS: 0
WHEEZING: 0
MEMORY LOSS: 0
SENSORY CHANGE: 0
PND: 0
FEVER: 0
WEIGHT LOSS: 0
CHILLS: 0
BLURRED VISION: 0
PSYCHIATRIC NEGATIVE: 1
CARDIOVASCULAR NEGATIVE: 1
VOMITING: 0
DOUBLE VISION: 0
SPEECH CHANGE: 0
DIZZINESS: 0
EYE PAIN: 0
EYES NEGATIVE: 1
DEPRESSION: 0
FOCAL WEAKNESS: 0
COUGH: 0
RESPIRATORY NEGATIVE: 1
GASTROINTESTINAL NEGATIVE: 1
MUSCULOSKELETAL NEGATIVE: 1
HEARTBURN: 0
SORE THROAT: 0
HEADACHES: 0
PALPITATIONS: 0
ABDOMINAL PAIN: 0
HEMOPTYSIS: 0
CONSTITUTIONAL NEGATIVE: 1
BLOOD IN STOOL: 0

## 2020-02-19 ASSESSMENT — CHA2DS2 SCORE
PRIOR STROKE OR TIA OR THROMBOEMBOLISM: NO
SEX: MALE
AGE 75 OR GREATER: YES
DIABETES: YES
CHA2DS2 VASC SCORE: 5
AGE 65 TO 74: NO
HYPERTENSION: YES
CHF OR LEFT VENTRICULAR DYSFUNCTION: NO
VASCULAR DISEASE: YES

## 2020-02-19 NOTE — THERAPY
"Speech Language Therapy dysphagia treatment completed.   Functional Status:  Pt seen during and following esophagram; coughing noted with straw sips and while drinking barium less than fully upright.  Pt seen and training as well as written strategies were provided. Pt is aware of VFSS if exam needed in the future.  Currently tolerating regular diet with strategies to facilitate ease of swallow.  RN aware of status and recommendations.      Recommendations: Avoid use of straws, upright during and following meals. Floated med pass, outpt VFSS should status change from current baseline.   Plan of Care: Will benefit from Speech Therapy 2 times per week  Post-Acute Therapy: Anticipate that the patient will have no further speech therapy needs after discharge from the hospital unless outpt VFSS/MBS needed.  Pt has information pertaining to diagnostic.    See \"Rehab Therapy-Acute\" Patient Summary Report for complete documentation.     "

## 2020-02-19 NOTE — PROGRESS NOTES
[facility] Services Progress Note     Hemodialysis treatment ordered today per Dr. Hodges x 3 hours.   Treatment initiated at 1353, ended at 1653.      Patient tolerated tx; see paper flow sheet for details.      Net UF 2000 mL.      Post tx, CVC flushed with saline then locked with heparin 1000 units/mL per designated amount in each wing then clamped and capped. Aspirate heparin prior to next CVC use.     Report given to Primary RN.

## 2020-02-19 NOTE — DISCHARGE INSTRUCTIONS
Discharge Instructions    Discharged to home by car with relative. Discharged via wheelchair, hospital escort: Yes.  Special equipment needed: Not Applicable    Be sure to schedule a follow-up appointment with your primary care doctor or any specialists as instructed.     Discharge Plan:   Diet Plan: Discussed  Activity Level: Discussed  Confirmed Follow up Appointment: Patient to Call and Schedule Appointment  Confirmed Symptoms Management: Discussed  Medication Reconciliation Updated: Yes  Influenza Vaccine Indication: Not indicated: Previously immunized this influenza season and > 8 years of age    I understand that a diet low in cholesterol, fat, and sodium is recommended for good health. Unless I have been given specific instructions below for another diet, I accept this instruction as my diet prescription.   Other diet: healthyDysphagia  Swallowing problems (dysphagia) occur when solids and liquids seem to stick in your throat on the way down to your stomach, or the food takes longer to get to the stomach. Other symptoms include regurgitating food, noises coming from the throat, chest discomfort with swallowing, and a feeling of fullness or the feeling of something being stuck in your throat when swallowing. When blockage in your throat is complete, it may be associated with drooling.  CAUSES   Problems with swallowing may occur because of problems with the muscles. The food cannot be propelled in the usual manner into your stomach. You may have ulcers, scar tissue, or inflammation in the tube down which food travels from your mouth to your stomach (esophagus), which blocks food from passing normally into the stomach. Causes of inflammation include:  · Acid reflux from your stomach into your esophagus.  · Infection.  · Radiation treatment for cancer.  · Medicines taken without enough fluids to wash them down into your stomach.  You may have nerve problems that prevent signals from being sent to the muscles of  your esophagus to contract and move your food down to your stomach. Globus pharyngeus is a relatively common problem in which there is a sense of an obstruction or difficulty in swallowing, without any physical abnormalities of the swallowing passages being found. This problem usually improves over time with reassurance and testing to rule out other causes.  DIAGNOSIS  Dysphagia can be diagnosed and its cause can be determined by tests in which you swallow a white substance that helps illuminate the inside of your throat (contrast medium) while X-rays are taken. Sometimes a flexible telescope that is inserted down your throat (endoscopy) to look at your esophagus and stomach is used.  TREATMENT   · If the dysphagia is caused by acid reflux or infection, medicines may be used.  · If the dysphagia is caused by problems with your swallowing muscles, swallowing therapy may be used to help you strengthen your swallowing muscles.  · If the dysphagia is caused by a blockage or mass, procedures to remove the blockage may be done.  HOME CARE INSTRUCTIONS  · Try to eat soft food that is easier to swallow and check your weight on a daily basis to be sure that it is not decreasing.  · Be sure to drink liquids when sitting upright (not lying down).  SEEK MEDICAL CARE IF:  · You are losing weight because you are unable to swallow.  · You are coughing when you drink liquids (aspiration).  · You are coughing up partially digested food.  SEEK IMMEDIATE MEDICAL CARE IF:  · You are unable to swallow your own saliva?.  · You are having shortness of breath or a fever, or both.?  · You have a hoarse voice along with difficulty swallowing.  MAKE SURE YOU:  · Understand these instructions.  · Will watch your condition.  · Will get help right away if you are not doing well or get worse.  This information is not intended to replace advice given to you by your health care provider. Make sure you discuss any questions you have with your health  care provider.  Document Released: 12/15/2001 Document Revised: 01/08/2016 Document Reviewed: 06/06/2014  [employer] Interactive Patient Education © 2017 [employer]      Special Instructions: None    · Is patient discharged on Warfarin / Coumadin?   No     Depression / Suicide Risk    As you are discharged from this [facility] facility, it is important to learn how to keep safe from harming yourself.    Recognize the warning signs:  · Abrupt changes in personality, positive or negative- including increase in energy   · Giving away possessions  · Change in eating patterns- significant weight changes-  positive or negative  · Change in sleeping patterns- unable to sleep or sleeping all the time   · Unwillingness or inability to communicate  · Depression  · Unusual sadness, discouragement and loneliness  · Talk of wanting to die  · Neglect of personal appearance   · Rebelliousness- reckless behavior  · Withdrawal from people/activities they love  · Confusion- inability to concentrate     If you or a loved one observes any of these behaviors or has concerns about self-harm, here's what you can do:  · Talk about it- your feelings and reasons for harming yourself  · Remove any means that you might use to hurt yourself (examples: pills, rope, extension cords, firearm)  · Get professional help from the community (Mental Health, Substance Abuse, psychological counseling)  · Do not be alone:Call your Safe Contact- someone whom you trust who will be there for you.  · Call your local CRISIS HOTLINE 901-9458 or 710-648-0521  · Call your local Children's Mobile Crisis Response Team [place] (765) 602-5239 or www.Engagor  · Call the toll free National Suicide Prevention Hotlines   · National Suicide Prevention Lifeline 715-185-DPNX (7939)  · National Hope Line Network 800-SUICIDE (969-6361)    Follow up with PCP for dysphagia which are likely secondary to protrusion from your spine into your esophagus.

## 2020-02-19 NOTE — PROGRESS NOTES
Nephrology Daily Progress Note    Date of Service  2/19/2020    Chief Complaint  The patient is a 74-year-old gentleman with   end-stage renal disease who dialyzes Tuesday, Thursday, and Saturday.  He has   additional past medical history of hyperparathyroidism, coronary artery   disease status post stenting, atrial fibrillation, hypertension, and diabetes,   who presented with problems swallowing.  The patient reports that starting at   4:30-11:30 last night, it was difficult for him to clear secretions.  This   was associated with some shortness of breath.  It did not radiate towards his   neck.     The patient had admission in November where he had a CT scan of his neck,   which did not show any carotid plaques.  His CT scan today did not show any   significant soft tissue abnormalities, it did show abnormality at C3-C4.    Currently, the patient denies any dysphagia and he is now back to baseline.    Interval Problem Update  2/19 -HD yesterday without problem 2 liter UF, pt swallwoing improved    Review of Systems  Review of Systems   Constitutional: Negative.    HENT: Negative.    Eyes: Negative.    Respiratory: Negative.    Cardiovascular: Negative.    Gastrointestinal: Negative.    Genitourinary: Negative.    Musculoskeletal: Negative.    Skin: Negative.    Neurological: Negative.    Endo/Heme/Allergies: Negative.    Psychiatric/Behavioral: Negative.         Physical Exam  Temp:  [36.4 °C (97.5 °F)-36.9 °C (98.5 °F)] 36.8 °C (98.2 °F)  Pulse:  [] 88  Resp:  [17-19] 18  BP: (111-159)/(47-79) 112/50  SpO2:  [93 %-99 %] 93 %    Physical Exam  Vitals signs and nursing note reviewed.   Constitutional:       Appearance: Normal appearance.   HENT:      Head: Normocephalic and atraumatic.      Nose: Nose normal.      Mouth/Throat:      Mouth: Mucous membranes are dry.   Eyes:      Extraocular Movements: Extraocular movements intact.      Pupils: Pupils are equal, round, and reactive to light.   Neck:       Comments: Tunnel cath [facility]  Cardiovascular:      Rate and Rhythm: Normal rate and regular rhythm.      Pulses: Normal pulses.      Heart sounds: Normal heart sounds.   Pulmonary:      Effort: Pulmonary effort is normal.      Breath sounds: Normal breath sounds.   Abdominal:      General: Abdomen is flat.   Musculoskeletal: Normal range of motion.         General: No swelling.   Skin:     General: Skin is warm and dry.   Neurological:      General: No focal deficit present.      Mental Status: He is alert and oriented to person, place, and time.   Psychiatric:         Mood and Affect: Mood normal.         Behavior: Behavior normal.         Fluids    Intake/Output Summary (Last 24 hours) at 2/19/2020 1224  Last data filed at 2/18/2020 1702  Gross per 24 hour   Intake 300 ml   Output 2300 ml   Net -2000 ml       Laboratory  Recent Labs     02/17/20 1935 02/18/20 0301 02/19/20 0229   WBC 6.8 6.5 4.9   RBC 2.61* 2.45* 2.63*   HEMOGLOBIN 8.5* 8.0* 8.4*   HEMATOCRIT 25.3* 24.1* 25.9*   MCV 96.9 98.4* 98.5*   MCH 32.6 32.7 31.9   MCHC 33.6* 33.2* 32.4*   RDW 50.4* 51.5* 52.1*   PLATELETCT 188 180 202   MPV 9.2 9.1 9.0     Recent Labs     02/17/20 1935 02/18/20 0301 02/19/20 0229   SODIUM 138 140 136   POTASSIUM 3.4* 3.5* 3.8   CHLORIDE 102 103 100   CO2 20 22 27   GLUCOSE 120* 104* 138*   BUN 51* 50* 26*   CREATININE 5.16* 5.24* 3.30*   CALCIUM 9.2 9.0 9.1         No results for input(s): NTPROBNP in the last 72 hours.        Imaging  CONCLUSION:  The patient is a 74-year-old gentleman who was admitted with   dysphagia.  1.  Dysphagia.  question C3/c4 iritation, barium swallow pending  2.  Anemia low iron sats, will need epo/ iron continued at HD  3.  End-stage renal disease.  MWF  4.  Coronary artery disease.  5.  Atrial fibrillation.  6.  Diabetes.  7.  Hyperthyroidism.    Plan  Follow up swallowing eval  HD in am if still here  Iron/ epo in am if still here  Ok to DC from renal perspective    Assessment/Plan  No new  Assessment & Plan notes have been filed under this hospital service since the last note was generated.  Service: Nephrology

## 2020-02-19 NOTE — CARE PLAN
Problem: Communication  Goal: The ability to communicate needs accurately and effectively will improve  Outcome: PROGRESSING AS EXPECTED  Note: Patient communicates needs accurately as the arise.      Problem: Safety  Goal: Will remain free from falls  Outcome: PROGRESSING AS EXPECTED  Note: Patient educated on need to call for assistance before getting out of bed; Patient educated on need for bed alarm; Patient has not fallen this admit.

## 2020-02-19 NOTE — PROGRESS NOTES
Daily Progress Note:     Date of Service: 2/19/2020  Primary Team: [facility] Green Team   Attending: Harris Clay M.D.   Senior Resident: Dr. Peck  Intern: Dr. Pablo  Contact:  [phone]    Chief Complaint:   Difficulty in swallowing    Subjective:  Mr. Sepulveda is a 74-year-old male with PMHx of ESRD (on hemodialysis), CAD status post stents in proximal and mid LAD, IDDM, atrial fibrillation, hypothyroidism and GERD who has been admitted for the evaluation of difficulty in swallowing.    -No acute events overnight, patient was afebrile  -Patient was asymptomatic; did not have any complaints.  Unremarkable examination  -He is tolerating regular renal diet without any difficulty in swallowing  -Vital signs are within normal limits  -Hemoglobin is 8.4, iron studies showed normal iron, saturation and TIBC; ferritin is low 18  -Anemia likely secondary to ESRD; on Epogen and hemodialysis  --X-ray soft tissue neck on admission showed 'Confluent anterior osteophytes of the anterior cervical spine with bulky osteophyte at C3/C4 resulting in contour irregularity of the retropharyngeal soft tissues.'  -Nephrology consulted, appreciate recommendations  -Patient had regular hemodialysis yesterday  -He will be evaluated with esophagogram/barium swallow for dysphagia      Consultants/Specialty:  Nephrology    Review of Systems:    Review of Systems   Constitutional: Negative for chills, fever and weight loss.   HENT: Negative for congestion, ear pain, nosebleeds and sore throat.    Eyes: Negative for blurred vision, double vision, photophobia and pain.   Respiratory: Negative for cough, hemoptysis, shortness of breath and wheezing.    Cardiovascular: Negative for chest pain, palpitations, leg swelling and PND.   Gastrointestinal: Negative for abdominal pain, blood in stool, heartburn, nausea and vomiting.   Genitourinary: Negative for hematuria and urgency.   Musculoskeletal: Negative for joint pain and myalgias.   Skin:  Negative for itching and rash.   Neurological: Negative for dizziness, sensory change, speech change, focal weakness, seizures and headaches.   Psychiatric/Behavioral: Negative for depression and memory loss.       Objective Data:   Physical Exam:   Vitals:   Temp:  [36.4 °C (97.5 °F)-36.9 °C (98.5 °F)] 36.8 °C (98.2 °F)  Pulse:  [] 88  Resp:  [17-19] 18  BP: (111-159)/(47-79) 112/50  SpO2:  [93 %-99 %] 93 %       Physical Exam  Constitutional:       General: He is not in acute distress.     Appearance: Normal appearance. He is normal weight. He is not ill-appearing, toxic-appearing or diaphoretic.   HENT:      Head: Normocephalic and atraumatic.      Nose: Nose normal. No congestion or rhinorrhea.      Mouth/Throat:      Mouth: Mucous membranes are dry.      Pharynx: No oropharyngeal exudate or posterior oropharyngeal erythema.   Eyes:      Extraocular Movements: Extraocular movements intact.      Conjunctiva/sclera: Conjunctivae normal.      Pupils: Pupils are equal, round, and reactive to light.   Neck:      Musculoskeletal: Normal range of motion and neck supple. No neck rigidity or muscular tenderness.   Cardiovascular:      Rate and Rhythm: Normal rate and regular rhythm.      Heart sounds: No murmur. No friction rub. No gallop.    Pulmonary:      Effort: Pulmonary effort is normal. No respiratory distress.      Breath sounds: Normal breath sounds. No wheezing or rales.   Abdominal:      General: Abdomen is flat. There is no distension.      Tenderness: There is no abdominal tenderness. There is no guarding.   Musculoskeletal: Normal range of motion.         General: No swelling, tenderness or deformity.   Skin:     General: Skin is dry.      Coloration: Skin is not jaundiced or pale.      Findings: No bruising.   Neurological:      General: No focal deficit present.      Mental Status: He is alert and oriented to person, place, and time.   Psychiatric:         Mood and Affect: Mood normal.       Labs:      Recent Labs     02/17/20 1935 02/18/20  0301 02/19/20  0229   WBC 6.8 6.5 4.9   RBC 2.61* 2.45* 2.63*   HEMOGLOBIN 8.5* 8.0* 8.4*   HEMATOCRIT 25.3* 24.1* 25.9*   MCV 96.9 98.4* 98.5*   MCH 32.6 32.7 31.9   RDW 50.4* 51.5* 52.1*   PLATELETCT 188 180 202   MPV 9.2 9.1 9.0   NEUTSPOLYS 72.80* 69.00 63.00   LYMPHOCYTES 18.30* 20.80* 22.40   MONOCYTES 8.40 9.40 13.60*   EOSINOPHILS 0.00 0.30 0.40   BASOPHILS 0.10 0.20 0.40     Recent Labs     02/17/20 1935 02/18/20 0301 02/19/20 0229   SODIUM 138 140 136   POTASSIUM 3.4* 3.5* 3.8   CHLORIDE 102 103 100   CO2 20 22 27   GLUCOSE 120* 104* 138*   BUN 51* 50* 26*       Imaging:     DX-NECK FOR SOFT TISSUE   Final Result         1.  Unremarkable soft tissue examination of the neck.   2.  Confluent anterior osteophytes of the anterior cervical spine with bulky osteophyte at C3/C4 resulting in contour irregularity of the retropharyngeal soft tissues.      DX-CHEST-PORTABLE (1 VIEW)   Final Result         1.  Interstitial left pulmonary opacities suggests edema or infiltrates, similar to prior study. Differential would include chronic underlying lung disease.   2.  Atherosclerosis      DX-ESOPH. FLUORO (BARIUM SWALLOW)    (Results Pending)       Assessment and Plan:    * Dysphagia- (present on admission)  Assessment & Plan  -Resolved  -Today morning, he stated that he had difficulty in swallowing yesterday along with shortness of breath which gradually resolved and currently he is able to take ice chips and drink water without any difficulty; denied any other symptoms  -On exam, patient did not have any tenderness or swelling around neck; oropharynx clear without erythema or exudates; unremarkable abdominal examination  -X-ray soft tissue neck showed 'Confluent anterior osteophytes of the anterior cervical spine with bulky osteophyte at C3/C4 resulting in contour irregularity of the retropharyngeal soft tissues.'  -After bedside swallow evaluation, diet was resumed  -He  will be evaluated with esophagogram/barium swallow for dysphagia    Shortness of breath- (present on admission)  Assessment & Plan  -Resolved  -Patient complained of shortness of breath after having difficulty in swallowing yesterday; was not hypoxic on arrival  -Saturation was above 90% on room air after admission  -Cardiopulmonary examination unremarkable, no signs of volume overload.   -CXR showed 'Interstitial left pulmonary opacities suggests edema or infiltrates.'  Which were present before  -Continue to monitor      Paroxysmal A-fib (HCC)- (present on admission)  Assessment & Plan  -Patient has a history of atrial fibrillation   -EKG on admission showed sinus rhythm with RBBB; no atrial fibrillation   -CHDVASc2 score is 5 ; candidate for OAC   -continue Apixaban and carvedilol    End stage renal disease on dialysis (HCC)- (present on admission)  Assessment & Plan  -Patient has a history of end-stage renal disease, on hemodialysis (on Tue/Thu/Sat)  -Follows up with Dr. [facility].  -No symptoms of uremia or volume overload.  -Cr 5.16 , BUN 51, K 3.4 on admission  -Nephrology consulted, appreciate recommendations  -Continue Epogen and hemodialysis while inpatient    Anemia of chronic disease- (present on admission)  Assessment & Plan  -Hemoglobin 8.5 on admission ; normocytic anemia   -Likely secondary to end-stage renal disease   -Continue Epogen with hemodialysis         CAD (coronary artery disease)- (present on admission)  Assessment & Plan  -History of coronary artery disease status post stents in proximal and mid LAD   -Denied any cardiopulmonary symptoms after admission   -Vital signs are within normal limits   -Continue clopidogrel, statin, beta-blocker     Dyslipidemia- (present on admission)  Assessment & Plan  -Patient has a history of dyslipidemia   -continue Atorvastatin 40 mg daily    Hyperthyroidism- (present on admission)  Assessment & Plan  -Continue Methimazole 5 mg BID

## 2020-02-19 NOTE — PROGRESS NOTES
Received report fromTamiko RN and assumed care of patient at 0300. AOx4; SBA; Renal liquid diet; Permacath to right chest; PIV flushes, blood return noted, SL; Occasional flushing rash and itching per pt, PRN Benadryl available; Pt receives dialysis Tuesday, Thursday, Saturday. No complaints of pain at this time. Patient to get barium swallow in AM, no prep indicated per MD orders. Patient educated on need to call for assistance before getting out of bed and need for bed alarm until fall risk is determined. POC discussed with patient, all questions and concerns addressed; Bed locked and in lowest position; Alarms active and sounding; Call light and personal belongings within reach; Hourly rounding in place.

## 2020-02-19 NOTE — PROGRESS NOTES
2 RN Skin Check    2 RN skin check complete w/ JAN VICTORIA   Devices in place: N/A.  Skin assessed under devices: N\A.  Confirmed pressure ulcers found on: N/A.  New potential pressure ulcers noted on N/A. Wound consult placed N/A.  The following interventions in place Pillows.    Noted BLE are dry, flaky, scattered abrasions.

## 2020-02-19 NOTE — PROGRESS NOTES
Bedside report received from night shift RN. Assumed care. Pt is A&Ox4. Pt is in bed. Pt denies pain at this time. Pt was updated on the plan of care for the day. All questions answered.   Pt has call light within reach and bed is in lowest position.  All fall precautions in place. Pt had no other needs at this time, hourly rounding in place.  Patient scheduled for [facility] swallow this afternoon with a swallow evaluation first to clear for barium.

## 2020-02-19 NOTE — PROGRESS NOTES
Pt transported to R323 w/ transport in a wheel chair. Report called to bedside RN. Chart and belongings sent down w/ pt.

## 2020-02-19 NOTE — THERAPY
"  Speech Language Therapy Clinical Swallow Evaluation completed.  Functional Status: Pt seen for BSE; RN reporting diet being held pending SLP evaluation although tray delivered to pt's room shortly thereafter.  Evaluation reveals functional oral mechanism evaluation for speech and swallow and toleration of all textures (tsp trials of puree, soft and dry solids, as well as tsp and cup sips of ntl, thin liquids) without overt s/s of penetration or aspiration in the setting of noted C3-4 osteophytes per CT of neck.  No complaints of pain with swallow, no shortness of breath.  Pt describes admitting s/s consistent with possible reflux hours after eating, prior to admit.  Pt to be scheduled for an esophagram later today; SLP to follow.  Pt making soft food choices and education completed regarding modified diet selections as needed due to suspected esophageal dysmotility.  VFSS is available as needed but currently not indicated.   Recommendations - Diet:  Continue current renal regular (L7/L0) with pt making soft choices.                          Strategies: Head of Bed at 90 Degrees, sm bites/sips, alternating liquids/solids                          Medication Administration:  Oral with liquid wash; floating in puree can ease med pass due to esophageal dysphagia; pt is aware  Plan of Care: Will benefit from Speech Therapy 2 times per week  Post-Acute Therapy: Anticipate that the patient will have no further speech therapy needs after discharge from the hospital.      See \"Rehab Therapy-Acute\" Patient Summary Report for complete documentation.   "

## 2020-02-19 NOTE — PROGRESS NOTES
Received report from day shift RNPrecious. Assumed care of pt. Pt reports no pain at this time. Updated pt on plan of care. Pt resting comfortably in bed. Pt up self. Educated on use of call light. Hourly rounding and continuous monitoring in place.

## 2020-02-19 NOTE — CARE PLAN
Problem: Communication  Goal: The ability to communicate needs accurately and effectively will improve  Outcome: PROGRESSING AS EXPECTED   Pt whiteboard updated on plan of care  Pt encouraged to call for assistance  Pt encouraged to voice any concerns or questions regarding care plan  Pt updated on plan of care as it develops and changes     Problem: Safety  Goal: Will remain free from injury  Outcome: PROGRESSING AS EXPECTED   Treaded socks in use  Call light within reach and patient calls appropriately  Medication education provided prior to administration  Medications administered as ordered  Bed locked in lowest position

## 2020-02-20 NOTE — CONSULTS
Diabetes education: Pt has a hx of diabetes on Lantus 5 unit hs at home but was on no medications nor finger sticks while in hospital. Pt was admitted with blood sugar of 120 and Hg A1c of 5.3% from 11/22/19. Glucose readings were 120, 104 (fasting) and 138 ( fasting today).  Pt was discharged before seen ( order written 1342 today) .

## 2020-02-20 NOTE — PROGRESS NOTES
Patient discharged. IV removed. Patient left unit via wheelchair with relative . Personal belongings with patient when leaving unit. Patient given  instructions to call and schedule an appointment with his physician. Verbalizes understanding. Copy of discharge instructions with patient and in the chart.

## 2020-03-03 LAB — EKG IMPRESSION: NORMAL

## 2020-03-08 NOTE — PROGRESS NOTES
[facility] Progress Note     Hemodialysis treatment ordered today per Dr. Curtis x 3 hours.   Treatment initiated at 1008, ended at 1308.      Patient tolerated tx; see paper flow sheet for details.      Net UF 1,000 mL.      Post tx, CVC flushed with saline then locked with heparin 1000 units/mL per designated amount in each wing then clamped and capped. Aspirate heparin prior to next CVC use.     Report given to Primary RN.       no

## 2020-03-23 ENCOUNTER — TELEPHONE (OUTPATIENT)
Dept: HEALTH INFORMATION MANAGEMENT | Facility: OTHER | Age: 75
End: 2020-03-23

## 2020-03-23 NOTE — TELEPHONE ENCOUNTER
1. Caller Name: Luis Sepulveda                       Call Back Number: [phone] (home)     [facility] PCP or Specialty Provider: Yes Kos        2.  Does patient have any active symptoms of respiratory illness (fever OR cough OR shortness of breath OR sore throat)? Cough, chronic    3.  Does patient have any comoribidities? COPD, CHF, DM and ESRD    4.  Has the patient traveled in the last 14 days OR had any known contact with someone who is suspected or confirmed to have COVID-19?  No.    5. Disposition: Cleared by RN Triage; OK to keep/schedule appointment    Note routed to [facility] Provider: IDALMIS only.

## 2020-03-26 ENCOUNTER — OFFICE VISIT (OUTPATIENT)
Dept: MEDICAL GROUP | Age: 75
End: 2020-03-26
Payer: MEDICARE

## 2020-03-26 VITALS
HEIGHT: 66 IN | TEMPERATURE: 99.6 F | OXYGEN SATURATION: 96 % | SYSTOLIC BLOOD PRESSURE: 96 MMHG | BODY MASS INDEX: 26.1 KG/M2 | HEART RATE: 105 BPM | WEIGHT: 162.4 LBS | DIASTOLIC BLOOD PRESSURE: 52 MMHG

## 2020-03-26 DIAGNOSIS — Z95.5 PRESENCE OF STENT IN LAD CORONARY ARTERY: ICD-10-CM

## 2020-03-26 DIAGNOSIS — N25.81 SECONDARY HYPERPARATHYROIDISM OF RENAL ORIGIN (HCC): ICD-10-CM

## 2020-03-26 DIAGNOSIS — N18.6 END STAGE RENAL DISEASE ON DIALYSIS (HCC): ICD-10-CM

## 2020-03-26 DIAGNOSIS — I10 ESSENTIAL HYPERTENSION: ICD-10-CM

## 2020-03-26 DIAGNOSIS — Z79.4 CONTROLLED TYPE 2 DIABETES MELLITUS WITH COMPLICATION, WITH LONG-TERM CURRENT USE OF INSULIN (HCC): ICD-10-CM

## 2020-03-26 DIAGNOSIS — Z79.01 CHRONIC ANTICOAGULATION: ICD-10-CM

## 2020-03-26 DIAGNOSIS — E78.5 DYSLIPIDEMIA: ICD-10-CM

## 2020-03-26 DIAGNOSIS — E11.8 CONTROLLED TYPE 2 DIABETES MELLITUS WITH COMPLICATION, WITH LONG-TERM CURRENT USE OF INSULIN (HCC): ICD-10-CM

## 2020-03-26 DIAGNOSIS — I48.0 PAROXYSMAL A-FIB (HCC): ICD-10-CM

## 2020-03-26 DIAGNOSIS — I25.10 CORONARY ARTERY DISEASE INVOLVING NATIVE CORONARY ARTERY OF NATIVE HEART WITHOUT ANGINA PECTORIS: ICD-10-CM

## 2020-03-26 DIAGNOSIS — Z99.2 END STAGE RENAL DISEASE ON DIALYSIS (HCC): ICD-10-CM

## 2020-03-26 DIAGNOSIS — D63.8 ANEMIA OF CHRONIC DISEASE: ICD-10-CM

## 2020-03-26 DIAGNOSIS — E11.40 NEUROPATHY DUE TO TYPE 2 DIABETES MELLITUS (HCC): ICD-10-CM

## 2020-03-26 DIAGNOSIS — E05.90 HYPERTHYROIDISM: ICD-10-CM

## 2020-03-26 DIAGNOSIS — E04.1 THYROID NODULE: ICD-10-CM

## 2020-03-26 DIAGNOSIS — Z12.11 SCREENING FOR MALIGNANT NEOPLASM OF COLON: ICD-10-CM

## 2020-03-26 DIAGNOSIS — I51.89 GRADE I DIASTOLIC DYSFUNCTION: ICD-10-CM

## 2020-03-26 PROCEDURE — 99215 OFFICE O/P EST HI 40 MIN: CPT | Performed by: INTERNAL MEDICINE

## 2020-03-26 RX ORDER — CALCITRIOL 0.25 UG/1
0.25 CAPSULE, LIQUID FILLED ORAL DAILY
Status: ON HOLD | COMMUNITY
Start: 2020-02-28 | End: 2021-01-01

## 2020-03-26 ASSESSMENT — PAIN SCALES - GENERAL: PAINLEVEL: NO PAIN

## 2020-03-26 ASSESSMENT — FIBROSIS 4 INDEX: FIB4 SCORE: 1.69

## 2020-03-26 ASSESSMENT — PATIENT HEALTH QUESTIONNAIRE - PHQ9: CLINICAL INTERPRETATION OF PHQ2 SCORE: 0

## 2020-03-26 NOTE — PROGRESS NOTES
CHIEF COMPLAINT  Chief Complaint   Patient presents with   • Follow-Up     thyroid   DM    HPI  Luis Sepulveda is a 74 y.o. male who presents today for the following    DM 2 controlled with neuropathy and CKD stage V / anemia of chronic disease stage V  Onset/Dg: in his 40'  Diabetes education: no     Medications:   • Insulin: Lantus 5 U HS  • Tradjenta 5 mg in AMs  • ACE/ARB: no  • Statin: no  • ASA: yes  Compliant with medications: yes  Checking feet daily/wear soft socks/shoes: advised     Diabetes ABCDE TARGETS  • A1c, last: 5.3  • Fingersticks: intermittent  • Mean BS:   • Hypoglycemia: no  o No symptoms of hypoglycemia: tremors, hunger, sometimes dizzy  • Blood Pressure, goal < 140/90: yes  • Cholesterol-Lipid Panel: LDL 61  • Dysalbuminuria: pos  • Vitamin D: 25; prescription supplement     Diet: regular  Exercise: insufficient  BMI: 26     DM complications:  • Peripheral neuropathy:           C/o numbness or tingling in feet.  • Retinopathy:    Last eye exam: 9/2018. No retinopathy.   • Nephropathy:    CKD stage V, anemia of CKD on Epo; f/u by nephrology  • CVS:     Has CAD  • GI:     No gastropathy     FH of DM: Brother     Hypertension, CAD/stented LAD, diastolic dysfunction, paroxysmal afib/on anticoagulation with Eliquis  74-year-old, male, history of controlled hypertension, coronary artery disease, stented LAD in the [facility], grade 1 diastolic dysfunction, paroxysmal A. Fib, anticoagulated with Eliquis.    He has had recently multiple admissions/hospitalizations, due to shortness of breath, tachycardia, postdialysis hypotension.    The last hospitalization was in mid February 2020, less than 1 month ago, discharge summary was he was reviewed, with the following:  -Principal problem is dysphagia shortness of breath  -Barium swallow showed that she having contractions  -Advised to follow-up with neurosurgery as needed    Medications: Carvedilol 6.25 mg twice daily, Lasix  40 mg as needed, Eliquis 5 mg twice daily  Taking as prescribed.   He is not measuring blood pressure at home  Denies: - headaches, vision problems, tinnitus.  - chest pain/pressure, palpitations, irregular heart beats, exertional, dyspnea, peripheral edema.  Low salt diet: yes  Diet / exercise / BMI: as above.   FH of HTN: Negative    Echocardiography, 11/22/2019  No prior study is available for comparison.   Normal left ventricular chamber size.  Left ventricular ejection fraction is visually estimated to be 45%.  Grade I diastolic dysfunction.  Moderately dilated left atrium.  Normal inferior vena cava size and inspiratory collapse.  Evidence of delayed (>5 beat) right to left shunt suggestive of   pulmonary arterio-venous malformation.      Dyslipidemia  No medications.  Pending lipid panel.  Diet / exercise / BMI: as above.   FH: Negative     Hyperthyroidism, thyroid nodule  Methimazole, 5 mg daily, taking as prescribed  No temperature intolerance. No change in weight, hair/skin quality, BMs.   No tremors, weakness.  No peripheral swelling.  No mood changes.  FH: son  US thyroid showed large thyroid nodules  - negative/benign bx 8/23/18  Referred to endocrinology, did not schedule OV.     Thyroid US, 8/14/18:  The thyroid gland is heterogeneous.  Vascularity is normal.  The right lobe of the thyroid gland measures 2.00 cm x 5.79 cm x 2.23 cm.  The left lobe of the thyroid gland measures 1.97 cm x 4.51 cm x 1.84 cm.  The isthmus measures 0.65 cm.  Bilateral solid masses are confirmed.  Upper pole right thyroid lobe mass measured 21 x 13 x 8 mm with moderate vascularity. This is well circumscribed and slightly hypoechoic.  Right interpolar mass is hypervascular measuring 13 x 12 x 9 mm.  Left upper pole mass is solid measuring 6 mm maximally with some adjacent vascularity. Additional smaller masses     Reviewed PMH, PSH, FH, SH, ALL, HCM/IMM, no changes  Reviewed MEDS, no changes    Patient Active Problem List     Diagnosis Date Noted   • Dysphagia 02/18/2020     Priority: High   • Shortness of breath 02/17/2020     Priority: High   • NSTEMI (non-ST elevated myocardial infarction) ([facility]) 11/11/2019     Priority: High   • Polyarthritis 06/25/2018     Priority: High   • Elevated troponin 11/28/2019     Priority: Medium   • Paroxysmal A-fib ([facility]) 11/16/2019     Priority: Medium   • Diabetes mellitus type 2, controlled, with complications ([facility]) 08/13/2019     Priority: Medium   • End stage renal disease on dialysis ([facility]) 08/13/2019     Priority: Medium   • Essential hypertension 08/15/2018     Priority: Medium   • Anemia of chronic disease 08/15/2018     Priority: Medium   • CAD (coronary artery disease) 06/25/2018     Priority: Medium   • Hyperthyroidism 06/25/2018     Priority: Low   • Hypotension due to hypovolemia 12/02/2019   • Acute metabolic encephalopathy 11/22/2019   • Secondary hyperparathyroidism of renal origin ([facility]) 11/15/2019   • Dyslipidemia 04/11/2019   • Thyroid nodule 04/11/2019   • Gastroesophageal reflux disease 08/15/2018   • Health care maintenance 08/15/2018   • Hepatic cyst 08/02/2018   • Renal cyst 08/02/2018     CURRENT MEDICATIONS  Current Outpatient Medications   Medication Sig Dispense Refill   • calcitRIOL (ROCALTROL) 0.25 MCG Cap      • apixaban (ELIQUIS) 2.5mg Tab Take 2.5 mg by mouth 2 Times a Day.     • carvedilol (COREG) 3.125 MG Tab Take 3.125 mg by mouth 2 times a day, with meals.     • insulin glargine (LANTUS SOLOSTAR) 100 UNIT/ML Solution Pen-injector injection Inject 5 Units as instructed every bedtime.     • clopidogrel (PLAVIX) 75 MG Tab Take 1 Tab by mouth every day. 30 Tab 6   • methimazole (TAPAZOLE) 5 MG Tab Take 1 Tab by mouth 2 Times a Day. 60 Tab 3   • diphenhydrAMINE (BENADRYL) 25 MG Tab Take 25 mg by mouth every 6 hours as needed for Itching.     • pantoprazole (PROTONIX) 40 MG Tablet Delayed Response Take 40 mg by mouth every bedtime.     • calcium acetate (PHOS-LO) 667 MG Cap  Take 1,334 mg by mouth 3 times a day, with meals.     • atorvastatin (LIPITOR) 40 MG Tab Take 1 Tab by mouth every bedtime. 30 Tab 0     No current facility-administered medications for this visit.      ALLERGIES  Allergies: Patient has no known allergies.  PAST MEDICAL HISTORY  Past Medical History:   Diagnosis Date   • CAD (coronary artery disease)     stents   • Chronic kidney disease (CKD), stage V (HCC)    • Diabetes    • Hypertension    • Osteoarthritis    • Peripheral neuropathy      SURGICAL HISTORY  He  has a past surgical history that includes appendectomy; other orthopedic surgery; and other cardiac surgery.  SOCIAL HISTORY  Social History     Tobacco Use   • Smoking status: Former Smoker     Packs/day: 1.00     Years: 55.00     Pack years: 55.00     Types: Cigarettes     Last attempt to quit: 2014     Years since quittin.1   • Smokeless tobacco: Never Used   Substance Use Topics   • Alcohol use: No   • Drug use: No     Social History     Social History Narrative   • Not on file     FAMILY HISTORY  Family History   Problem Relation Age of Onset   • Heart Disease Mother    • Alcohol/Drug Father    • Thyroid Son    • Diabetes Brother    • Hypertension Neg Hx    • Hyperlipidemia Neg Hx      Family Status   Relation Name Status   • Mo     • Fa     • Son  (Not Specified)   • Bro  (Not Specified)   • Neg Hx  (Not Specified)     ROS   Constitutional: Negative for fever, chills; complains of fatigue.  HENT: Negative for congestion, sore throat.  Eyes: Negative for vision problems.   Respiratory: Negative for cough.  Cardiovascular: As above.   Gastrointestinal: Negative for heartburn, nausea, abdominal pain.  And as above.  Genitourinary: As above.  Musculoskeletal: Has DDD.   Skin: Negative for rash.   Neuro: Complains of intermittent frequent dizziness, probably due to hypotension.  Endo/Heme/Allergies: Does not bruise/bleed easily.   Psychiatric/Behavioral: Negative for  "depression.    PHYSICAL EXAM   Blood Pressure (Abnormal) 96/52 (BP Location: Left arm, Patient Position: Sitting, BP Cuff Size: Adult)   Pulse (Abnormal) 105   Temperature 37.6 °C (99.6 °F) (Temporal)   Height 1.664 m (5' 5.5\")   Weight 73.7 kg (162 lb 6.4 oz)   Oxygen Saturation 96%  Body mass index is 26.61 kg/m².  General:  NAD, well appearing  HEENT:   NC/AT, PERRLA, EOMI, TMs are clear. Oropharyngeal mucosa is pink,  without lesions;  no cervical / supraclavicular  lymphadenopathy, no thyromegaly.    Cardiovascular: RRR.   No m/r/g.       Lungs:   CTAB, no w/r/r, no respiratory distress.  Abdomen: Soft, NT/ND; no hepatosplenomegaly.  Extremities:  2+ DP and radial pulses bilaterally.  No c/c/e.   Skin:  Warm, dry.  No erythema. No rash.   Neurologic: Alert & oriented x 3. CN II-XII grossly intact. No focal deficits.  Psychiatric:  Affect normal, mood normal, judgment normal.    Labs     Labs are reviewed and discussed with a patient  Lab Results   Component Value Date/Time    CHOLSTRLTOT 74 (L) 11/23/2019 04:29 AM    LDL 40 11/23/2019 04:29 AM    HDL 15 (A) 11/23/2019 04:29 AM    TRIGLYCERIDE 97 11/23/2019 04:29 AM       Lab Results   Component Value Date/Time    SODIUM 136 02/19/2020 02:29 AM    POTASSIUM 3.8 02/19/2020 02:29 AM    CHLORIDE 100 02/19/2020 02:29 AM    CO2 27 02/19/2020 02:29 AM    GLUCOSE 138 (H) 02/19/2020 02:29 AM    BUN 26 (H) 02/19/2020 02:29 AM    CREATININE 3.30 (H) 02/19/2020 02:29 AM     Lab Results   Component Value Date/Time    ALKPHOSPHAT 96 02/19/2020 02:29 AM    ASTSGOT 18 02/19/2020 02:29 AM    ALTSGPT 16 02/19/2020 02:29 AM    TBILIRUBIN 0.7 02/19/2020 02:29 AM      Lab Results   Component Value Date/Time    HBA1C 5.3 11/22/2019 10:53 AM    HBA1C 5.3 09/10/2019 11:03 AM    HBA1C 6.3 (H) 04/08/2019 11:56 AM     No results found for: TSH  Lab Results   Component Value Date/Time    FREET4 2.59 (H) 12/10/2019 12:45 PM    FREET4 4.16 (H) 11/13/2019 06:49 PM       Lab Results "   Component Value Date/Time    WBC 4.9 02/19/2020 02:29 AM    RBC 2.63 (L) 02/19/2020 02:29 AM    HEMOGLOBIN 8.4 (L) 02/19/2020 02:29 AM    HEMATOCRIT 25.9 (L) 02/19/2020 02:29 AM    MCV 98.5 (H) 02/19/2020 02:29 AM    MCH 31.9 02/19/2020 02:29 AM    MCHC 32.4 (L) 02/19/2020 02:29 AM    MPV 9.0 02/19/2020 02:29 AM    NEUTSPOLYS 63.00 02/19/2020 02:29 AM    LYMPHOCYTES 22.40 02/19/2020 02:29 AM    MONOCYTES 13.60 (H) 02/19/2020 02:29 AM    EOSINOPHILS 0.40 02/19/2020 02:29 AM    BASOPHILS 0.40 02/19/2020 02:29 AM    ANISOCYTOSIS 1+ 11/28/2019 07:35 AM      Imaging     Reviewed and discussed, per HPI    Assessment and Plan     Luis Sepulveda is a 74 y.o. male    1. Controlled type 2 diabetes mellitus with complication, with long-term current use of insulin ([facility])  Diabetes is controlled, may decrease Lantus by 2 units  - REFERRAL TO ENDOCRINOLOGY  - HEMOGLOBIN A1C; Future    2. Neuropathy due to type 2 diabetes mellitus ([facility])  As above    3. End stage renal disease on dialysis ([facility])  4. Anemia of chronic disease  Continue dialysis, nephrology follow-up    5. Essential hypertension  Continue current treatment cardiology follow-up  6. Coronary artery disease involving native coronary artery of native heart without angina pectoris  7. Presence of stent in LAD coronary artery  8. Grade I diastolic dysfunction  9. Paroxysmal A-fib ([facility])  10. Chronic anticoagulation  As above    11. Dyslipidemia  Controlled, continue current treatment  - Lipid Profile; Future    12. Hyperthyroidism  TSH has been suppressed, patient was not seen in the office more than 6 months  -Pending ordered labs    -Uncontrolled, high risk for A. fib, not seen in the office more than 6 months  -Pending labs, to adjust medication    - risk for re-hospitalization  - REFERRAL TO ENDOCRINOLOGY    13. Thyroid nodule  Pending imaging and labs  - REFERRAL TO ENDOCRINOLOGY  - TSH; Future  - FREE THYROXINE; Future  - US-SOFT TISSUES OF HEAD - NECK;  Future    14. Screening for malignant neoplasm of colon  - COLOGUARD (FIT DNA)    13.  Secondary hyperparathyroidism of renal origin  -On Rocaltrol, nephrology follow-up, hemodialysis patient      Counseling:   - Smoking:  Nonsmoker    Followup: according to labs, probably within 1 week    Discussed ER precautions    All questions are answered.    Please note that this dictation was created using voice recognition software, and that there might be errors of hal and possibly content.

## 2020-04-01 ENCOUNTER — HOSPITAL ENCOUNTER (OUTPATIENT)
Dept: LAB | Facility: MEDICAL CENTER | Age: 75
End: 2020-04-01
Attending: INTERNAL MEDICINE
Payer: MEDICARE

## 2020-04-01 DIAGNOSIS — E04.1 THYROID NODULE: ICD-10-CM

## 2020-04-01 DIAGNOSIS — Z79.4 CONTROLLED TYPE 2 DIABETES MELLITUS WITH COMPLICATION, WITH LONG-TERM CURRENT USE OF INSULIN (HCC): ICD-10-CM

## 2020-04-01 DIAGNOSIS — E11.8 CONTROLLED TYPE 2 DIABETES MELLITUS WITH COMPLICATION, WITH LONG-TERM CURRENT USE OF INSULIN (HCC): ICD-10-CM

## 2020-04-01 DIAGNOSIS — E78.5 DYSLIPIDEMIA: ICD-10-CM

## 2020-04-01 LAB
CHOLEST SERPL-MCNC: 88 MG/DL (ref 100–199)
HDLC SERPL-MCNC: 26 MG/DL
LDLC SERPL CALC-MCNC: 34 MG/DL
T4 FREE SERPL-MCNC: 2.55 NG/DL (ref 0.53–1.43)
TRIGL SERPL-MCNC: 142 MG/DL (ref 0–149)
TSH SERPL DL<=0.005 MIU/L-ACNC: <0.005 UIU/ML (ref 0.38–5.33)

## 2020-04-01 PROCEDURE — 84439 ASSAY OF FREE THYROXINE: CPT

## 2020-04-01 PROCEDURE — 84443 ASSAY THYROID STIM HORMONE: CPT

## 2020-04-01 PROCEDURE — 36415 COLL VENOUS BLD VENIPUNCTURE: CPT

## 2020-04-01 PROCEDURE — 80061 LIPID PANEL: CPT

## 2020-04-01 PROCEDURE — 83036 HEMOGLOBIN GLYCOSYLATED A1C: CPT

## 2020-04-02 ENCOUNTER — OFFICE VISIT (OUTPATIENT)
Dept: MEDICAL GROUP | Age: 75
End: 2020-04-02
Payer: MEDICARE

## 2020-04-02 VITALS
WEIGHT: 161.4 LBS | OXYGEN SATURATION: 96 % | SYSTOLIC BLOOD PRESSURE: 110 MMHG | BODY MASS INDEX: 25.94 KG/M2 | HEIGHT: 66 IN | HEART RATE: 107 BPM | DIASTOLIC BLOOD PRESSURE: 44 MMHG | TEMPERATURE: 98.1 F

## 2020-04-02 DIAGNOSIS — H10.9 CONJUNCTIVITIS OF LEFT EYE, UNSPECIFIED CONJUNCTIVITIS TYPE: ICD-10-CM

## 2020-04-02 DIAGNOSIS — N25.81 HYPERPARATHYROIDISM DUE TO END STAGE RENAL DISEASE ON DIALYSIS (HCC): ICD-10-CM

## 2020-04-02 DIAGNOSIS — N18.6 HYPERPARATHYROIDISM DUE TO END STAGE RENAL DISEASE ON DIALYSIS (HCC): ICD-10-CM

## 2020-04-02 DIAGNOSIS — N18.6 END STAGE RENAL DISEASE ON DIALYSIS (HCC): ICD-10-CM

## 2020-04-02 DIAGNOSIS — Z79.4 CONTROLLED TYPE 2 DIABETES MELLITUS WITH COMPLICATION, WITH LONG-TERM CURRENT USE OF INSULIN (HCC): ICD-10-CM

## 2020-04-02 DIAGNOSIS — I10 ESSENTIAL HYPERTENSION: ICD-10-CM

## 2020-04-02 DIAGNOSIS — D63.8 ANEMIA OF CHRONIC DISEASE: ICD-10-CM

## 2020-04-02 DIAGNOSIS — Z99.2 HYPERPARATHYROIDISM DUE TO END STAGE RENAL DISEASE ON DIALYSIS (HCC): ICD-10-CM

## 2020-04-02 DIAGNOSIS — E11.8 CONTROLLED TYPE 2 DIABETES MELLITUS WITH COMPLICATION, WITH LONG-TERM CURRENT USE OF INSULIN (HCC): ICD-10-CM

## 2020-04-02 DIAGNOSIS — E11.42 DIABETIC POLYNEUROPATHY ASSOCIATED WITH TYPE 2 DIABETES MELLITUS (HCC): ICD-10-CM

## 2020-04-02 DIAGNOSIS — I25.10 CORONARY ARTERY DISEASE INVOLVING NATIVE CORONARY ARTERY OF NATIVE HEART WITHOUT ANGINA PECTORIS: ICD-10-CM

## 2020-04-02 DIAGNOSIS — E78.5 DYSLIPIDEMIA: ICD-10-CM

## 2020-04-02 DIAGNOSIS — E05.90 HYPERTHYROIDISM: ICD-10-CM

## 2020-04-02 DIAGNOSIS — E04.1 THYROID NODULE: ICD-10-CM

## 2020-04-02 DIAGNOSIS — I48.0 PAROXYSMAL A-FIB (HCC): ICD-10-CM

## 2020-04-02 DIAGNOSIS — Z79.01 CHRONIC ANTICOAGULATION: ICD-10-CM

## 2020-04-02 DIAGNOSIS — Z99.2 END STAGE RENAL DISEASE ON DIALYSIS (HCC): ICD-10-CM

## 2020-04-02 DIAGNOSIS — I51.89 DIASTOLIC DYSFUNCTION: ICD-10-CM

## 2020-04-02 PROBLEM — R06.02 SHORTNESS OF BREATH: Status: RESOLVED | Noted: 2020-02-17 | Resolved: 2020-04-02

## 2020-04-02 PROCEDURE — 99214 OFFICE O/P EST MOD 30 MIN: CPT | Performed by: INTERNAL MEDICINE

## 2020-04-02 RX ORDER — METHIMAZOLE 5 MG/1
TABLET ORAL
Qty: 270 TAB | Refills: 0 | Status: SHIPPED | OUTPATIENT
Start: 2020-04-02 | End: 2020-06-08

## 2020-04-02 RX ORDER — OFLOXACIN 3 MG/ML
5 SOLUTION AURICULAR (OTIC) DAILY
Qty: 10 ML | Refills: 1 | Status: SHIPPED | OUTPATIENT
Start: 2020-04-02 | End: 2020-04-21

## 2020-04-02 RX ORDER — CLOPIDOGREL BISULFATE 75 MG/1
75 TABLET ORAL DAILY
Qty: 90 TAB | Refills: 1 | Status: SHIPPED | OUTPATIENT
Start: 2020-04-02 | End: 2020-01-01 | Stop reason: SDUPTHER

## 2020-04-02 ASSESSMENT — FIBROSIS 4 INDEX: FIB4 SCORE: 1.69

## 2020-04-02 NOTE — PROGRESS NOTES
CHIEF COMPLAINT  DM f/u    HPI  Luis Sepulveda is a 74 y.o. male who presents today for the following     DM 2 controlled with neuropathy and ESRD / anemia of chronic disease   Onset/Dg: in his 40'  Diabetes education: no     Medications:   • Insulin: Lantus 5 U HS  • ACE/ARB: no  • Statin: no  • ASA: no on apixaban  Compliant with medications: yes  Checking feet daily/wear soft socks/shoes: advised     Diabetes ABCDE TARGETS  • A1c, last: 5.3  • Fingersticks: intermittent  • Mean BS:   • Hypoglycemia: no  o No symptoms of hypoglycemia: tremors, hunger, sometimes dizzy  • Blood Pressure, goal < 140/90: yes  • Cholesterol-Lipid Panel: LDL 61  • Dysalbuminuria: pos  • Vitamin D: 25; prescription supplement     Diet: regular  Exercise: insufficient  BMI: 26     DM complications:  • Peripheral neuropathy:  C/o numbness or tingling in feet.  • Retinopathy:                            Last eye exam: 9/2018. No retinopathy.   • Nephropathy:                          ESRD, anemia of CKD on Epo; f/u by nephrology  • CVS:                                        Has CAD  • GI:                                           No gastropathy     FH of DM: Brother     Hypertension, CAD/stented LAD, diastolic dysfunction, paroxysmal afib/on anticoagulation with Eliquis  74-year-old, male, history of controlled hypertension, coronary artery disease, stented LAD in the [facility], grade 1 diastolic dysfunction, paroxysmal A. Fib, anticoagulated with Eliquis.     He has had recently multiple admissions/hospitalizations, due to shortness of breath, tachycardia, postdialysis hypotension.     The last hospitalization was in mid February 2020, less than 1 month ago, discharge summary was he was reviewed, with the following:  -Principal problem is dysphagia shortness of breath  -Barium swallow showed that she having contractions  -Advised to follow-up with neurosurgery as needed     Medications: Carvedilol 6.25 mg  twice daily, Lasix 40 mg as needed, Eliquis 5 mg twice daily  Taking as prescribed.   He is not measuring blood pressure at home  Denies: - headaches, vision problems, tinnitus.  - chest pain/pressure, palpitations, irregular heart beats, exertional, dyspnea, peripheral edema.  Low salt diet: yes  Diet / exercise / BMI: as above.   FH of HTN: Negative     Echocardiography, 11/22/2019  No prior study is available for comparison.   Normal left ventricular chamber size.  Left ventricular ejection fraction is visually estimated to be 45%.  Grade I diastolic dysfunction.  Moderately dilated left atrium.  Normal inferior vena cava size and inspiratory collapse.  Evidence of delayed (>5 beat) right to left shunt suggestive of   pulmonary arterio-venous malformation.      Dyslipidemia  No medications.  Pending lipid panel.  Diet / exercise / BMI: as above.   FH: Negative     Hyperthyroidism, thyroid nodule  Methimazole, 5 mg daily, taking as prescribed  No temperature intolerance. No change in weight, hair/skin quality, BMs.   No tremors, weakness.  No peripheral swelling.  No mood changes.  FH: son  US thyroid showed large thyroid nodules  - negative/benign bx 8/23/18  Referred to endocrinology, did not schedule OV.     Thyroid US, 8/14/18:  The thyroid gland is heterogeneous.  Vascularity is normal.  The right lobe of the thyroid gland measures 2.00 cm x 5.79 cm x 2.23 cm.  The left lobe of the thyroid gland measures 1.97 cm x 4.51 cm x 1.84 cm.  The isthmus measures 0.65 cm.  Bilateral solid masses are confirmed.  Upper pole right thyroid lobe mass measured 21 x 13 x 8 mm with moderate vascularity. This is well circumscribed and slightly hypoechoic.  Right interpolar mass is hypervascular measuring 13 x 12 x 9 mm.  Left upper pole mass is solid measuring 6 mm maximally with some adjacent vascularity. Additional smaller masses    Conjunctivitis LT eye  C/o redness, yellow/green nasal d/c.  No fever, chills,  pain.    Reviewed PMH, PSH, FH, SH, ALL, HCM/IMM, no changes  Reviewed MEDS, no changes    Patient Active Problem List    Diagnosis Date Noted   • Dysphagia 02/18/2020     Priority: High   • Shortness of breath 02/17/2020     Priority: High   • NSTEMI (non-ST elevated myocardial infarction) ([facility]) 11/11/2019     Priority: High   • Polyarthritis 06/25/2018     Priority: High   • Elevated troponin 11/28/2019     Priority: Medium   • Paroxysmal A-fib ([facility]) 11/16/2019     Priority: Medium   • Diabetes mellitus type 2, controlled, with complications ([facility]) 08/13/2019     Priority: Medium   • End stage renal disease on dialysis ([facility]) 08/13/2019     Priority: Medium   • Essential hypertension 08/15/2018     Priority: Medium   • Anemia of chronic disease 08/15/2018     Priority: Medium   • CAD (coronary artery disease) 06/25/2018     Priority: Medium   • Hyperthyroidism 06/25/2018     Priority: Low   • Grade I diastolic dysfunction 03/26/2020   • Presence of stent in LAD coronary artery 03/26/2020   • Chronic anticoagulation 03/26/2020   • Hypotension due to hypovolemia 12/02/2019   • Acute metabolic encephalopathy 11/22/2019   • Secondary hyperparathyroidism of renal origin ([facility]) 11/15/2019   • Dyslipidemia 04/11/2019   • Thyroid nodule 04/11/2019   • Gastroesophageal reflux disease 08/15/2018   • Health care maintenance 08/15/2018   • Hepatic cyst 08/02/2018   • Renal cyst 08/02/2018     CURRENT MEDICATIONS  Current Outpatient Medications   Medication Sig Dispense Refill   • calcitRIOL (ROCALTROL) 0.25 MCG Cap      • apixaban (ELIQUIS) 2.5mg Tab Take 2.5 mg by mouth 2 Times a Day.     • carvedilol (COREG) 3.125 MG Tab Take 3.125 mg by mouth 2 times a day, with meals.     • insulin glargine (LANTUS SOLOSTAR) 100 UNIT/ML Solution Pen-injector injection Inject 5 Units as instructed every bedtime.     • clopidogrel (PLAVIX) 75 MG Tab Take 1 Tab by mouth every day. 30 Tab 6   • methimazole (TAPAZOLE) 5 MG Tab Take 1 Tab by mouth  2 Times a Day. 60 Tab 3   • diphenhydrAMINE (BENADRYL) 25 MG Tab Take 25 mg by mouth every 6 hours as needed for Itching.     • pantoprazole (PROTONIX) 40 MG Tablet Delayed Response Take 40 mg by mouth every bedtime.     • calcium acetate (PHOS-LO) 667 MG Cap Take 1,334 mg by mouth 3 times a day, with meals.     • atorvastatin (LIPITOR) 40 MG Tab Take 1 Tab by mouth every bedtime. 30 Tab 0     No current facility-administered medications for this visit.      ALLERGIES  Allergies: Patient has no known allergies.  PAST MEDICAL HISTORY  Past Medical History:   Diagnosis Date   • CAD (coronary artery disease)     stents   • Chronic kidney disease (CKD), stage V (HCC)    • Diabetes    • Hypertension    • Osteoarthritis    • Peripheral neuropathy      SURGICAL HISTORY  He  has a past surgical history that includes appendectomy; other orthopedic surgery; and other cardiac surgery.  SOCIAL HISTORY  Social History     Tobacco Use   • Smoking status: Former Smoker     Packs/day: 1.00     Years: 55.00     Pack years: 55.00     Types: Cigarettes     Last attempt to quit: 2014     Years since quittin.1   • Smokeless tobacco: Never Used   Substance Use Topics   • Alcohol use: No   • Drug use: No     Social History     Social History Narrative   • Not on file     FAMILY HISTORY  Family History   Problem Relation Age of Onset   • Heart Disease Mother    • Alcohol/Drug Father    • Thyroid Son    • Diabetes Brother    • Hypertension Neg Hx    • Hyperlipidemia Neg Hx      Family Status   Relation Name Status   • Mo     • Fa     • Son  (Not Specified)   • Bro  (Not Specified)   • Neg Hx  (Not Specified)     ROS   Constitutional: Negative for fever, chills, fatigue.  HENT: Negative for congestion, sore throat.  Eyes: Per HPI.   Respiratory: Negative for cough, shortness of breath.  Cardiovascular: Negative for chest pain, palpitations.   Gastrointestinal: Negative for heartburn, nausea, abdominal pain.  "  Genitourinary: Per HPI.  Musculoskeletal: Negative for significant myalgia, back and joint pain.   Skin: Negative for rash.   Neuro: Negative for dizziness, weakness and headaches.   Endo/Heme/Allergies: Does not bruise/bleed easily.   Psychiatric/Behavioral: Negative for depression.    PHYSICAL EXAM   Blood Pressure 110/44 (BP Location: Right arm, Patient Position: Sitting, BP Cuff Size: Adult)   Pulse (Abnormal) 107   Temperature 36.7 °C (98.1 °F) (Temporal)   Height 1.664 m (5' 5.5\")   Weight 73.2 kg (161 lb 6.4 oz)   Oxygen Saturation 96%  Body mass index is 26.45 kg/m².  General:  NAD, well appearing  HEENT:   NC/AT, PERRLA, EOMI - left conjunctivitis.  TMs are clear. Oropharyngeal mucosa is pink,  without lesions;  no cervical / supraclavicular  lymphadenopathy, no thyromegaly.    Cardiovascular: RRR.   No m/r/g.       Lungs:   CTAB, no w/r/r, no respiratory distress.  Abdomen: Soft, NT/ND; no hepatosplenomegaly.  Extremities:  2+ DP and radial pulses bilaterally.  No c/c/e.   Skin:  Warm, dry.  No erythema. No rash.   Neurologic: Alert & oriented x 3. CN II-XII grossly intact. No focal deficits.  Psychiatric:  Affect normal, mood normal, judgment normal.    Labs     Labs are reviewed and discussed with a patient  Lab Results   Component Value Date/Time    CHOLSTRLTOT 88 (L) 04/01/2020 09:34 AM    LDL 34 04/01/2020 09:34 AM    HDL 26 (A) 04/01/2020 09:34 AM    TRIGLYCERIDE 142 04/01/2020 09:34 AM       Lab Results   Component Value Date/Time    SODIUM 136 02/19/2020 02:29 AM    POTASSIUM 3.8 02/19/2020 02:29 AM    CHLORIDE 100 02/19/2020 02:29 AM    CO2 27 02/19/2020 02:29 AM    GLUCOSE 138 (H) 02/19/2020 02:29 AM    BUN 26 (H) 02/19/2020 02:29 AM    CREATININE 3.30 (H) 02/19/2020 02:29 AM     Lab Results   Component Value Date/Time    ALKPHOSPHAT 96 02/19/2020 02:29 AM    ASTSGOT 18 02/19/2020 02:29 AM    ALTSGPT 16 02/19/2020 02:29 AM    TBILIRUBIN 0.7 02/19/2020 02:29 AM      Lab Results   Component " Value Date/Time    HBA1C 5.3 11/22/2019 10:53 AM    HBA1C 5.3 09/10/2019 11:03 AM    HBA1C 6.3 (H) 04/08/2019 11:56 AM     No results found for: TSH  Lab Results   Component Value Date/Time    FREET4 2.55 (H) 04/01/2020 09:34 AM    FREET4 2.59 (H) 12/10/2019 12:45 PM     Lab Results   Component Value Date/Time    WBC 4.9 02/19/2020 02:29 AM    RBC 2.63 (L) 02/19/2020 02:29 AM    HEMOGLOBIN 8.4 (L) 02/19/2020 02:29 AM    HEMATOCRIT 25.9 (L) 02/19/2020 02:29 AM    MCV 98.5 (H) 02/19/2020 02:29 AM    MCH 31.9 02/19/2020 02:29 AM    MCHC 32.4 (L) 02/19/2020 02:29 AM    MPV 9.0 02/19/2020 02:29 AM    NEUTSPOLYS 63.00 02/19/2020 02:29 AM    LYMPHOCYTES 22.40 02/19/2020 02:29 AM    MONOCYTES 13.60 (H) 02/19/2020 02:29 AM    EOSINOPHILS 0.40 02/19/2020 02:29 AM    BASOPHILS 0.40 02/19/2020 02:29 AM    ANISOCYTOSIS 1+ 11/28/2019 07:35 AM     Imaging     Reviewed and discussed, per HPI    Assessment and Plan     Luis Sepulveda is a 74 y.o. male    1. Controlled type 2 diabetes mellitus with complication, with long-term current use of insulin (HCC)  Controlled, continue current treatment  - HEMOGLOBIN A1C; Future  - REFERRAL TO OPHTHALMOLOGY    2. Diabetic polyneuropathy associated with type 2 diabetes mellitus (HCC)  As above    3. End stage renal disease on dialysis (HCC)  4. Hyperparathyroidism  5. Anemia of chronic disease  6. Essential hypertension  Has have hypotension/dizziness after HD.  Continue current tx, nephrology f/u    7. Coronary artery disease involving native coronary artery of native heart without angina pectoris  8. Diastolic dysfunction  9. Paroxysmal A-fib (HCC)  10. Chronic anticoagulation  Controlled, continue with current treatment, cardiology f/u.    11. Dyslipidemia  Controlled, continue with current treatment.  - Lipid Profile; Future    12. Hyperthyroidism  Uncontrolled, increase methimazole_ 1.5 + 1 tab per day  - TSH; Future  - FREE THYROXINE; Future  - T3 FREE; Future  - methimazole  (TAPAZOLE) 5 MG Tab; Take 1.5 tabs in am, and 1 tab in evening  Dispense: 270 Tab; Refill: 0    13. Thyroid nodule  Given order for echo    14. Conjunctivitis of left eye, unspecified conjunctivitis type  - ofloxacin otic sol (FLOXIN OTIC) 0.3 % Solution; Place 5 Drops in ear every day. Administer drops to both ears.  Dispense: 10 mL; Refill: 1    Counseling:   - Smoking:  Nonsmoker    Followup: in 3 months    All questions are answered.    Please note that this dictation was created using voice recognition software, and that there might be errors of hal and possibly content.

## 2020-04-03 LAB
EST. AVERAGE GLUCOSE BLD GHB EST-MCNC: 120 MG/DL
HBA1C MFR BLD: 5.8 % (ref 0–5.6)

## 2020-04-21 ENCOUNTER — HOSPITAL ENCOUNTER (INPATIENT)
Facility: MEDICAL CENTER | Age: 75
LOS: 4 days | DRG: 270 | End: 2020-04-25
Attending: EMERGENCY MEDICINE | Admitting: INTERNAL MEDICINE
Payer: MEDICARE

## 2020-04-21 ENCOUNTER — APPOINTMENT (OUTPATIENT)
Dept: RADIOLOGY | Facility: MEDICAL CENTER | Age: 75
DRG: 270 | End: 2020-04-21
Attending: EMERGENCY MEDICINE
Payer: MEDICARE

## 2020-04-21 ENCOUNTER — APPOINTMENT (OUTPATIENT)
Dept: RADIOLOGY | Facility: MEDICAL CENTER | Age: 75
DRG: 270 | End: 2020-04-21
Attending: STUDENT IN AN ORGANIZED HEALTH CARE EDUCATION/TRAINING PROGRAM
Payer: MEDICARE

## 2020-04-21 DIAGNOSIS — I48.0 PAROXYSMAL A-FIB (HCC): ICD-10-CM

## 2020-04-21 DIAGNOSIS — N18.6 END STAGE RENAL DISEASE ON DIALYSIS (HCC): ICD-10-CM

## 2020-04-21 DIAGNOSIS — L03.116 CELLULITIS OF LEFT FOOT: ICD-10-CM

## 2020-04-21 DIAGNOSIS — N18.5 CHRONIC RENAL FAILURE, STAGE 5 (HCC): ICD-10-CM

## 2020-04-21 DIAGNOSIS — I73.9 PERIPHERAL VASCULAR DISEASE, UNSPECIFIED (HCC): ICD-10-CM

## 2020-04-21 DIAGNOSIS — Z99.2 END STAGE RENAL DISEASE ON DIALYSIS (HCC): ICD-10-CM

## 2020-04-21 DIAGNOSIS — L97.528 DIABETIC ULCER OF TOE OF LEFT FOOT ASSOCIATED WITH DIABETES MELLITUS DUE TO UNDERLYING CONDITION, WITH OTHER ULCER SEVERITY (HCC): ICD-10-CM

## 2020-04-21 DIAGNOSIS — E11.9 DIABETES MELLITUS TYPE 2 IN NONOBESE (HCC): ICD-10-CM

## 2020-04-21 DIAGNOSIS — E08.621 DIABETIC ULCER OF TOE OF LEFT FOOT ASSOCIATED WITH DIABETES MELLITUS DUE TO UNDERLYING CONDITION, WITH OTHER ULCER SEVERITY (HCC): ICD-10-CM

## 2020-04-21 PROBLEM — E03.9 HYPOTHYROIDISM: Status: ACTIVE | Noted: 2020-04-21

## 2020-04-21 PROBLEM — L03.90 CELLULITIS: Status: ACTIVE | Noted: 2020-04-21

## 2020-04-21 LAB
ALBUMIN SERPL BCP-MCNC: 3.8 G/DL (ref 3.2–4.9)
ALBUMIN/GLOB SERPL: 1.3 G/DL
ALP SERPL-CCNC: 141 U/L (ref 30–99)
ALT SERPL-CCNC: 13 U/L (ref 2–50)
ANION GAP SERPL CALC-SCNC: 13 MMOL/L (ref 7–16)
AST SERPL-CCNC: 14 U/L (ref 12–45)
BASOPHILS # BLD AUTO: 0.2 % (ref 0–1.8)
BASOPHILS # BLD: 0.02 K/UL (ref 0–0.12)
BILIRUB SERPL-MCNC: 0.5 MG/DL (ref 0.1–1.5)
BUN SERPL-MCNC: 17 MG/DL (ref 8–22)
CALCIUM SERPL-MCNC: 9.1 MG/DL (ref 8.5–10.5)
CHLORIDE SERPL-SCNC: 96 MMOL/L (ref 96–112)
CO2 SERPL-SCNC: 28 MMOL/L (ref 20–33)
CREAT SERPL-MCNC: 2.81 MG/DL (ref 0.5–1.4)
CRP SERPL HS-MCNC: 4.61 MG/DL (ref 0–0.75)
EOSINOPHIL # BLD AUTO: 0 K/UL (ref 0–0.51)
EOSINOPHIL NFR BLD: 0 % (ref 0–6.9)
ERYTHROCYTE [DISTWIDTH] IN BLOOD BY AUTOMATED COUNT: 49.7 FL (ref 35.9–50)
GLOBULIN SER CALC-MCNC: 2.9 G/DL (ref 1.9–3.5)
GLUCOSE BLD-MCNC: 235 MG/DL (ref 65–99)
GLUCOSE SERPL-MCNC: 252 MG/DL (ref 65–99)
HCT VFR BLD AUTO: 34.3 % (ref 42–52)
HGB BLD-MCNC: 11.7 G/DL (ref 14–18)
IMM GRANULOCYTES # BLD AUTO: 0.03 K/UL (ref 0–0.11)
IMM GRANULOCYTES NFR BLD AUTO: 0.4 % (ref 0–0.9)
LYMPHOCYTES # BLD AUTO: 1.1 K/UL (ref 1–4.8)
LYMPHOCYTES NFR BLD: 13.3 % (ref 22–41)
MCH RBC QN AUTO: 32.1 PG (ref 27–33)
MCHC RBC AUTO-ENTMCNC: 34.1 G/DL (ref 33.7–35.3)
MCV RBC AUTO: 94.2 FL (ref 81.4–97.8)
MONOCYTES # BLD AUTO: 0.76 K/UL (ref 0–0.85)
MONOCYTES NFR BLD AUTO: 9.2 % (ref 0–13.4)
NEUTROPHILS # BLD AUTO: 6.37 K/UL (ref 1.82–7.42)
NEUTROPHILS NFR BLD: 76.9 % (ref 44–72)
NRBC # BLD AUTO: 0 K/UL
NRBC BLD-RTO: 0 /100 WBC
PLATELET # BLD AUTO: 150 K/UL (ref 164–446)
PMV BLD AUTO: 9.3 FL (ref 9–12.9)
POTASSIUM SERPL-SCNC: 4 MMOL/L (ref 3.6–5.5)
PROT SERPL-MCNC: 6.7 G/DL (ref 6–8.2)
RBC # BLD AUTO: 3.64 M/UL (ref 4.7–6.1)
SODIUM SERPL-SCNC: 137 MMOL/L (ref 135–145)
WBC # BLD AUTO: 8.3 K/UL (ref 4.8–10.8)

## 2020-04-21 PROCEDURE — 700105 HCHG RX REV CODE 258: Performed by: EMERGENCY MEDICINE

## 2020-04-21 PROCEDURE — 85652 RBC SED RATE AUTOMATED: CPT

## 2020-04-21 PROCEDURE — 96367 TX/PROPH/DG ADDL SEQ IV INF: CPT

## 2020-04-21 PROCEDURE — 770006 HCHG ROOM/CARE - MED/SURG/GYN SEMI*

## 2020-04-21 PROCEDURE — 93922 UPR/L XTREMITY ART 2 LEVELS: CPT

## 2020-04-21 PROCEDURE — 86140 C-REACTIVE PROTEIN: CPT

## 2020-04-21 PROCEDURE — 80053 COMPREHEN METABOLIC PANEL: CPT

## 2020-04-21 PROCEDURE — 700102 HCHG RX REV CODE 250 W/ 637 OVERRIDE(OP): Performed by: STUDENT IN AN ORGANIZED HEALTH CARE EDUCATION/TRAINING PROGRAM

## 2020-04-21 PROCEDURE — 73620 X-RAY EXAM OF FOOT: CPT | Mod: LT

## 2020-04-21 PROCEDURE — 82962 GLUCOSE BLOOD TEST: CPT

## 2020-04-21 PROCEDURE — 99285 EMERGENCY DEPT VISIT HI MDM: CPT

## 2020-04-21 PROCEDURE — 93005 ELECTROCARDIOGRAM TRACING: CPT | Performed by: STUDENT IN AN ORGANIZED HEALTH CARE EDUCATION/TRAINING PROGRAM

## 2020-04-21 PROCEDURE — 93926 LOWER EXTREMITY STUDY: CPT | Mod: LT

## 2020-04-21 PROCEDURE — 96365 THER/PROPH/DIAG IV INF INIT: CPT

## 2020-04-21 PROCEDURE — 96366 THER/PROPH/DIAG IV INF ADDON: CPT

## 2020-04-21 PROCEDURE — 85025 COMPLETE CBC W/AUTO DIFF WBC: CPT

## 2020-04-21 PROCEDURE — 87040 BLOOD CULTURE FOR BACTERIA: CPT

## 2020-04-21 PROCEDURE — 99223 1ST HOSP IP/OBS HIGH 75: CPT | Mod: GC | Performed by: INTERNAL MEDICINE

## 2020-04-21 PROCEDURE — 700111 HCHG RX REV CODE 636 W/ 250 OVERRIDE (IP): Performed by: EMERGENCY MEDICINE

## 2020-04-21 PROCEDURE — A9270 NON-COVERED ITEM OR SERVICE: HCPCS | Performed by: STUDENT IN AN ORGANIZED HEALTH CARE EDUCATION/TRAINING PROGRAM

## 2020-04-21 RX ORDER — DEXTROSE MONOHYDRATE 25 G/50ML
50 INJECTION, SOLUTION INTRAVENOUS
Status: DISCONTINUED | OUTPATIENT
Start: 2020-04-21 | End: 2020-04-25 | Stop reason: HOSPADM

## 2020-04-21 RX ORDER — INSULIN GLARGINE 100 [IU]/ML
5 INJECTION, SOLUTION SUBCUTANEOUS EVERY EVENING
Status: DISCONTINUED | OUTPATIENT
Start: 2020-04-21 | End: 2020-04-21

## 2020-04-21 RX ORDER — AMOXICILLIN 250 MG
2 CAPSULE ORAL 2 TIMES DAILY
Status: DISCONTINUED | OUTPATIENT
Start: 2020-04-21 | End: 2020-04-25 | Stop reason: HOSPADM

## 2020-04-21 RX ORDER — OMEPRAZOLE 20 MG/1
20 CAPSULE, DELAYED RELEASE ORAL
Status: DISCONTINUED | OUTPATIENT
Start: 2020-04-21 | End: 2020-04-25 | Stop reason: HOSPADM

## 2020-04-21 RX ORDER — ACETAMINOPHEN 325 MG/1
650 TABLET ORAL EVERY 6 HOURS PRN
Status: DISCONTINUED | OUTPATIENT
Start: 2020-04-21 | End: 2020-04-25 | Stop reason: HOSPADM

## 2020-04-21 RX ORDER — METHIMAZOLE 5 MG/1
5 TABLET ORAL 3 TIMES DAILY
Status: DISCONTINUED | OUTPATIENT
Start: 2020-04-22 | End: 2020-04-21

## 2020-04-21 RX ORDER — ATORVASTATIN CALCIUM 20 MG/1
40 TABLET, FILM COATED ORAL
Status: DISCONTINUED | OUTPATIENT
Start: 2020-04-21 | End: 2020-04-25 | Stop reason: HOSPADM

## 2020-04-21 RX ORDER — METHIMAZOLE 5 MG/1
7.5 TABLET ORAL EVERY MORNING
Status: DISCONTINUED | OUTPATIENT
Start: 2020-04-22 | End: 2020-04-22

## 2020-04-21 RX ORDER — POLYETHYLENE GLYCOL 3350 17 G/17G
1 POWDER, FOR SOLUTION ORAL
Status: DISCONTINUED | OUTPATIENT
Start: 2020-04-21 | End: 2020-04-25 | Stop reason: HOSPADM

## 2020-04-21 RX ORDER — BISACODYL 10 MG
10 SUPPOSITORY, RECTAL RECTAL
Status: DISCONTINUED | OUTPATIENT
Start: 2020-04-21 | End: 2020-04-25 | Stop reason: HOSPADM

## 2020-04-21 RX ORDER — CLOPIDOGREL BISULFATE 75 MG/1
75 TABLET ORAL DAILY
Status: DISCONTINUED | OUTPATIENT
Start: 2020-04-22 | End: 2020-04-25 | Stop reason: HOSPADM

## 2020-04-21 RX ORDER — DEXTROSE MONOHYDRATE 25 G/50ML
50 INJECTION, SOLUTION INTRAVENOUS
Status: DISCONTINUED | OUTPATIENT
Start: 2020-04-21 | End: 2020-04-21

## 2020-04-21 RX ORDER — METHIMAZOLE 5 MG/1
5 TABLET ORAL EVERY EVENING
Status: DISCONTINUED | OUTPATIENT
Start: 2020-04-22 | End: 2020-04-22

## 2020-04-21 RX ORDER — INSULIN GLARGINE 100 [IU]/ML
5 INJECTION, SOLUTION SUBCUTANEOUS EVERY EVENING
Status: DISCONTINUED | OUTPATIENT
Start: 2020-04-21 | End: 2020-04-25 | Stop reason: HOSPADM

## 2020-04-21 RX ORDER — METHIMAZOLE 5 MG/1
5 TABLET ORAL DAILY
Status: DISCONTINUED | OUTPATIENT
Start: 2020-04-22 | End: 2020-04-21

## 2020-04-21 RX ORDER — CARVEDILOL 3.12 MG/1
3.12 TABLET ORAL 2 TIMES DAILY WITH MEALS
Status: DISCONTINUED | OUTPATIENT
Start: 2020-04-21 | End: 2020-04-25 | Stop reason: HOSPADM

## 2020-04-21 RX ORDER — OFLOXACIN 3 MG/ML
1 SOLUTION/ DROPS OPHTHALMIC 4 TIMES DAILY
Status: ON HOLD | COMMUNITY
End: 2020-04-22

## 2020-04-21 RX ADMIN — APIXABAN 2.5 MG: 2.5 TABLET, FILM COATED ORAL at 22:36

## 2020-04-21 RX ADMIN — INSULIN GLARGINE 5 UNITS: 100 INJECTION, SOLUTION SUBCUTANEOUS at 23:26

## 2020-04-21 RX ADMIN — AMPICILLIN SODIUM AND SULBACTAM SODIUM 3 G: 2; 1 INJECTION, POWDER, FOR SOLUTION INTRAMUSCULAR; INTRAVENOUS at 17:22

## 2020-04-21 RX ADMIN — ATORVASTATIN CALCIUM 40 MG: 20 TABLET, FILM COATED ORAL at 22:36

## 2020-04-21 RX ADMIN — CARVEDILOL 3.12 MG: 3.12 TABLET, FILM COATED ORAL at 22:36

## 2020-04-21 RX ADMIN — OMEPRAZOLE 20 MG: 20 CAPSULE, DELAYED RELEASE ORAL at 22:36

## 2020-04-21 RX ADMIN — VANCOMYCIN HYDROCHLORIDE 1900 MG: 500 INJECTION, POWDER, LYOPHILIZED, FOR SOLUTION INTRAVENOUS at 18:16

## 2020-04-21 ASSESSMENT — COGNITIVE AND FUNCTIONAL STATUS - GENERAL
WALKING IN HOSPITAL ROOM: A LITTLE
SUGGESTED CMS G CODE MODIFIER MOBILITY: CI
MOBILITY SCORE: 23
DAILY ACTIVITIY SCORE: 23
SUGGESTED CMS G CODE MODIFIER DAILY ACTIVITY: CI
DRESSING REGULAR LOWER BODY CLOTHING: A LITTLE

## 2020-04-21 ASSESSMENT — LIFESTYLE VARIABLES
EVER_SMOKED: YES
EVER FELT BAD OR GUILTY ABOUT YOUR DRINKING: NO
HOW MANY TIMES IN THE PAST YEAR HAVE YOU HAD 5 OR MORE DRINKS IN A DAY: 0
CONSUMPTION TOTAL: NEGATIVE
ON A TYPICAL DAY WHEN YOU DRINK ALCOHOL HOW MANY DRINKS DO YOU HAVE: 0
HAVE PEOPLE ANNOYED YOU BY CRITICIZING YOUR DRINKING: NO
AVERAGE NUMBER OF DAYS PER WEEK YOU HAVE A DRINK CONTAINING ALCOHOL: 0
TOTAL SCORE: 0
EVER HAD A DRINK FIRST THING IN THE MORNING TO STEADY YOUR NERVES TO GET RID OF A HANGOVER: NO
ALCOHOL_USE: NO
TOTAL SCORE: 0
HAVE YOU EVER FELT YOU SHOULD CUT DOWN ON YOUR DRINKING: NO
TOTAL SCORE: 0

## 2020-04-21 ASSESSMENT — ENCOUNTER SYMPTOMS
PALPITATIONS: 0
HEARTBURN: 0
SPEECH CHANGE: 0
SHORTNESS OF BREATH: 0
WEIGHT LOSS: 0
SORE THROAT: 0
COUGH: 0
STRIDOR: 0
FOCAL WEAKNESS: 0
NAUSEA: 0
MYALGIAS: 0
BLOOD IN STOOL: 0
FEVER: 0
SPUTUM PRODUCTION: 0
PHOTOPHOBIA: 0
HEADACHES: 0
ORTHOPNEA: 0
BLURRED VISION: 0
ABDOMINAL PAIN: 0
DIZZINESS: 0
CHILLS: 0
SENSORY CHANGE: 0
DOUBLE VISION: 0
MEMORY LOSS: 0
VOMITING: 0
SEIZURES: 0
LOSS OF CONSCIOUSNESS: 0
DEPRESSION: 0

## 2020-04-21 ASSESSMENT — FIBROSIS 4 INDEX
FIB4 SCORE: 1.69
FIB4 SCORE: 1.97

## 2020-04-22 ENCOUNTER — APPOINTMENT (OUTPATIENT)
Dept: CARDIOLOGY | Facility: MEDICAL CENTER | Age: 75
DRG: 270 | End: 2020-04-22
Attending: STUDENT IN AN ORGANIZED HEALTH CARE EDUCATION/TRAINING PROGRAM
Payer: MEDICARE

## 2020-04-22 ENCOUNTER — APPOINTMENT (OUTPATIENT)
Dept: RADIOLOGY | Facility: MEDICAL CENTER | Age: 75
DRG: 270 | End: 2020-04-22
Attending: NURSE PRACTITIONER
Payer: MEDICARE

## 2020-04-22 ENCOUNTER — APPOINTMENT (OUTPATIENT)
Dept: RADIOLOGY | Facility: MEDICAL CENTER | Age: 75
DRG: 270 | End: 2020-04-22
Attending: STUDENT IN AN ORGANIZED HEALTH CARE EDUCATION/TRAINING PROGRAM
Payer: MEDICARE

## 2020-04-22 PROBLEM — L97.521 ULCER OF FOOT, LEFT, LIMITED TO BREAKDOWN OF SKIN (HCC): Status: ACTIVE | Noted: 2020-04-21

## 2020-04-22 LAB
ANION GAP SERPL CALC-SCNC: 13 MMOL/L (ref 7–16)
BASOPHILS # BLD AUTO: 0.2 % (ref 0–1.8)
BASOPHILS # BLD: 0.02 K/UL (ref 0–0.12)
BUN SERPL-MCNC: 21 MG/DL (ref 8–22)
CALCIUM SERPL-MCNC: 8.8 MG/DL (ref 8.5–10.5)
CHLORIDE SERPL-SCNC: 98 MMOL/L (ref 96–112)
CO2 SERPL-SCNC: 27 MMOL/L (ref 20–33)
CREAT SERPL-MCNC: 3.19 MG/DL (ref 0.5–1.4)
EKG IMPRESSION: NORMAL
EOSINOPHIL # BLD AUTO: 0.03 K/UL (ref 0–0.51)
EOSINOPHIL NFR BLD: 0.3 % (ref 0–6.9)
ERYTHROCYTE [DISTWIDTH] IN BLOOD BY AUTOMATED COUNT: 49.4 FL (ref 35.9–50)
ERYTHROCYTE [SEDIMENTATION RATE] IN BLOOD BY WESTERGREN METHOD: 25 MM/HOUR (ref 0–20)
GLUCOSE BLD-MCNC: 140 MG/DL (ref 65–99)
GLUCOSE BLD-MCNC: 142 MG/DL (ref 65–99)
GLUCOSE BLD-MCNC: 159 MG/DL (ref 65–99)
GLUCOSE BLD-MCNC: 229 MG/DL (ref 65–99)
GLUCOSE SERPL-MCNC: 268 MG/DL (ref 65–99)
HCT VFR BLD AUTO: 31.8 % (ref 42–52)
HGB BLD-MCNC: 10.9 G/DL (ref 14–18)
IMM GRANULOCYTES # BLD AUTO: 0.03 K/UL (ref 0–0.11)
IMM GRANULOCYTES NFR BLD AUTO: 0.3 % (ref 0–0.9)
LV EJECT FRACT  99904: 45
LV EJECT FRACT MOD 2C 99903: 53.23
LV EJECT FRACT MOD 4C 99902: 35.13
LV EJECT FRACT MOD BP 99901: 41.29
LYMPHOCYTES # BLD AUTO: 1.09 K/UL (ref 1–4.8)
LYMPHOCYTES NFR BLD: 12.6 % (ref 22–41)
MCH RBC QN AUTO: 32.1 PG (ref 27–33)
MCHC RBC AUTO-ENTMCNC: 34.3 G/DL (ref 33.7–35.3)
MCV RBC AUTO: 93.5 FL (ref 81.4–97.8)
MONOCYTES # BLD AUTO: 0.8 K/UL (ref 0–0.85)
MONOCYTES NFR BLD AUTO: 9.3 % (ref 0–13.4)
NEUTROPHILS # BLD AUTO: 6.66 K/UL (ref 1.82–7.42)
NEUTROPHILS NFR BLD: 77.3 % (ref 44–72)
NRBC # BLD AUTO: 0 K/UL
NRBC BLD-RTO: 0 /100 WBC
PLATELET # BLD AUTO: 144 K/UL (ref 164–446)
PMV BLD AUTO: 9.4 FL (ref 9–12.9)
POTASSIUM SERPL-SCNC: 4.1 MMOL/L (ref 3.6–5.5)
RBC # BLD AUTO: 3.4 M/UL (ref 4.7–6.1)
SODIUM SERPL-SCNC: 138 MMOL/L (ref 135–145)
WBC # BLD AUTO: 8.6 K/UL (ref 4.8–10.8)

## 2020-04-22 PROCEDURE — 93306 TTE W/DOPPLER COMPLETE: CPT

## 2020-04-22 PROCEDURE — 700102 HCHG RX REV CODE 250 W/ 637 OVERRIDE(OP): Performed by: STUDENT IN AN ORGANIZED HEALTH CARE EDUCATION/TRAINING PROGRAM

## 2020-04-22 PROCEDURE — 93971 EXTREMITY STUDY: CPT | Mod: LT

## 2020-04-22 PROCEDURE — 302169 3/0 SUTURE W/NEEDLE: Performed by: NURSE PRACTITIONER

## 2020-04-22 PROCEDURE — A9270 NON-COVERED ITEM OR SERVICE: HCPCS | Performed by: STUDENT IN AN ORGANIZED HEALTH CARE EDUCATION/TRAINING PROGRAM

## 2020-04-22 PROCEDURE — 700105 HCHG RX REV CODE 258

## 2020-04-22 PROCEDURE — 770006 HCHG ROOM/CARE - MED/SURG/GYN SEMI*

## 2020-04-22 PROCEDURE — 700111 HCHG RX REV CODE 636 W/ 250 OVERRIDE (IP): Performed by: STUDENT IN AN ORGANIZED HEALTH CARE EDUCATION/TRAINING PROGRAM

## 2020-04-22 PROCEDURE — 700105 HCHG RX REV CODE 258: Performed by: STUDENT IN AN ORGANIZED HEALTH CARE EDUCATION/TRAINING PROGRAM

## 2020-04-22 PROCEDURE — 36415 COLL VENOUS BLD VENIPUNCTURE: CPT

## 2020-04-22 PROCEDURE — 80048 BASIC METABOLIC PNL TOTAL CA: CPT

## 2020-04-22 PROCEDURE — 99222 1ST HOSP IP/OBS MODERATE 55: CPT | Performed by: NURSE PRACTITIONER

## 2020-04-22 PROCEDURE — 93010 ELECTROCARDIOGRAM REPORT: CPT | Performed by: INTERNAL MEDICINE

## 2020-04-22 PROCEDURE — 82962 GLUCOSE BLOOD TEST: CPT | Mod: 91

## 2020-04-22 PROCEDURE — 302170 4/0 SUTURE W/NEEDLE: Performed by: NURSE PRACTITIONER

## 2020-04-22 PROCEDURE — 93306 TTE W/DOPPLER COMPLETE: CPT | Mod: 26 | Performed by: INTERNAL MEDICINE

## 2020-04-22 PROCEDURE — 85025 COMPLETE CBC W/AUTO DIFF WBC: CPT

## 2020-04-22 PROCEDURE — 93925 LOWER EXTREMITY STUDY: CPT | Mod: 26 | Performed by: INTERNAL MEDICINE

## 2020-04-22 PROCEDURE — 93925 LOWER EXTREMITY STUDY: CPT

## 2020-04-22 RX ORDER — LIDOCAINE HYDROCHLORIDE 20 MG/ML
20 INJECTION, SOLUTION INFILTRATION; PERINEURAL ONCE
Status: DISCONTINUED | OUTPATIENT
Start: 2020-04-22 | End: 2020-04-22

## 2020-04-22 RX ORDER — OFLOXACIN 3 MG/ML
1 SOLUTION/ DROPS OPHTHALMIC 4 TIMES DAILY
Status: DISCONTINUED | OUTPATIENT
Start: 2020-04-22 | End: 2020-04-22

## 2020-04-22 RX ORDER — LIDOCAINE HYDROCHLORIDE 20 MG/ML
20 INJECTION, SOLUTION INFILTRATION; PERINEURAL
Status: DISCONTINUED | OUTPATIENT
Start: 2020-04-22 | End: 2020-04-25 | Stop reason: HOSPADM

## 2020-04-22 RX ORDER — METHIMAZOLE 5 MG/1
5 TABLET ORAL 3 TIMES DAILY
Status: DISCONTINUED | OUTPATIENT
Start: 2020-04-22 | End: 2020-04-25 | Stop reason: HOSPADM

## 2020-04-22 RX ORDER — SODIUM CHLORIDE 9 MG/ML
INJECTION, SOLUTION INTRAVENOUS
Status: COMPLETED
Start: 2020-04-22 | End: 2020-04-22

## 2020-04-22 RX ORDER — DOXYCYCLINE 100 MG/1
100 TABLET ORAL EVERY 12 HOURS
Status: DISCONTINUED | OUTPATIENT
Start: 2020-04-22 | End: 2020-04-23

## 2020-04-22 RX ORDER — HYDRALAZINE HYDROCHLORIDE 20 MG/ML
10 INJECTION INTRAMUSCULAR; INTRAVENOUS EVERY 6 HOURS PRN
Status: DISCONTINUED | OUTPATIENT
Start: 2020-04-22 | End: 2020-04-25 | Stop reason: HOSPADM

## 2020-04-22 RX ORDER — CALCIUM ACETATE 667 MG/1
1334 TABLET ORAL
Status: DISCONTINUED | OUTPATIENT
Start: 2020-04-22 | End: 2020-04-25 | Stop reason: HOSPADM

## 2020-04-22 RX ADMIN — AMPICILLIN SODIUM AND SULBACTAM SODIUM 3 G: 2; 1 INJECTION, POWDER, FOR SOLUTION INTRAMUSCULAR; INTRAVENOUS at 05:11

## 2020-04-22 RX ADMIN — CARVEDILOL 3.12 MG: 3.12 TABLET, FILM COATED ORAL at 17:42

## 2020-04-22 RX ADMIN — INSULIN LISPRO 3 UNITS: 100 INJECTION, SOLUTION INTRAVENOUS; SUBCUTANEOUS at 13:41

## 2020-04-22 RX ADMIN — OMEPRAZOLE 20 MG: 20 CAPSULE, DELAYED RELEASE ORAL at 20:35

## 2020-04-22 RX ADMIN — DOXYCYCLINE 100 MG: 100 TABLET, FILM COATED ORAL at 06:43

## 2020-04-22 RX ADMIN — DOXYCYCLINE 100 MG: 100 TABLET, FILM COATED ORAL at 17:42

## 2020-04-22 RX ADMIN — Medication 1334 MG: at 17:42

## 2020-04-22 RX ADMIN — METHIMAZOLE 5 MG: 5 TABLET ORAL at 05:09

## 2020-04-22 RX ADMIN — CLOPIDOGREL BISULFATE 75 MG: 75 TABLET ORAL at 05:09

## 2020-04-22 RX ADMIN — APIXABAN 2.5 MG: 2.5 TABLET, FILM COATED ORAL at 05:09

## 2020-04-22 RX ADMIN — METHIMAZOLE 5 MG: 5 TABLET ORAL at 17:42

## 2020-04-22 RX ADMIN — AMPICILLIN SODIUM AND SULBACTAM SODIUM 3 G: 2; 1 INJECTION, POWDER, FOR SOLUTION INTRAMUSCULAR; INTRAVENOUS at 17:42

## 2020-04-22 RX ADMIN — SODIUM CHLORIDE 500 ML: 9 INJECTION, SOLUTION INTRAVENOUS at 05:11

## 2020-04-22 RX ADMIN — CARVEDILOL 3.12 MG: 3.12 TABLET, FILM COATED ORAL at 09:35

## 2020-04-22 RX ADMIN — METHIMAZOLE 5 MG: 5 TABLET ORAL at 12:39

## 2020-04-22 RX ADMIN — ATORVASTATIN CALCIUM 40 MG: 20 TABLET, FILM COATED ORAL at 20:35

## 2020-04-22 RX ADMIN — INSULIN GLARGINE 5 UNITS: 100 INJECTION, SOLUTION SUBCUTANEOUS at 17:47

## 2020-04-22 ASSESSMENT — ENCOUNTER SYMPTOMS
BLOOD IN STOOL: 0
SEIZURES: 0
MEMORY LOSS: 0
HEARTBURN: 0
HEADACHES: 0
SPEECH CHANGE: 0
DIZZINESS: 0
VOMITING: 0
PALPITATIONS: 0
NAUSEA: 0
SENSORY CHANGE: 0
FEVER: 0
CHILLS: 0
BRUISES/BLEEDS EASILY: 0
STRIDOR: 0
SHORTNESS OF BREATH: 0
EYE REDNESS: 0
MYALGIAS: 0
SORE THROAT: 0
COUGH: 0
FOCAL WEAKNESS: 0
BLURRED VISION: 0
ORTHOPNEA: 0
DEPRESSION: 0
DOUBLE VISION: 0
EYE PAIN: 0
PHOTOPHOBIA: 0
HALLUCINATIONS: 0
HEMOPTYSIS: 0
SPUTUM PRODUCTION: 0
ABDOMINAL PAIN: 0
POLYDIPSIA: 0

## 2020-04-22 NOTE — PROGRESS NOTES
Patient arrived on unit via gurney, was able to ambulate self into bed, steady gait. He is alert and oriented x4. On room air, currently denies pain. He has been oriented to surroundings including use of call light to alert staff of needs, bed is locked and in the lowest position, call light within reach.

## 2020-04-22 NOTE — CONSULTS
LIMB PRESERVATION SERVICE CONSULT      REFERRED BY: Dr. Cavazos    DATE OF CONSULTATION: 4/22/2020    REASON FOR CONSULT: Left great toe     HISTORY OF PRESENT ILLNESS: Luis Sepulveda is a 74 y.o.  with a past medical history that includes type 2 diabetes, peripheral neuropathy, atrial fibrillation, ESRD on hemodialysis, CAD S/P stents, admitted 4/21/2020 for Wound cellulitis [L03.90].   LPS has been consulted for left great toe ulcer.      Patient reports he noticed a black spot to the plantar distal aspect of the left great toe around 4/17/20.  He did not notice any drainage or odor.  Denies any erythema or edema.  He cleansed the area with a brush and alcohol.  The next day he noticed that the tissue was spongy but not swollen.  And then on 4/20/2020 the tissue became swollen with some erythema.  He made an appointment to see his podiatrist Dr. Au who he followed up with on 4/21/2020 and was directed towards the ED for further care.      IV antibiotics were started on this admission.  Infectious diseases has not been consulted.    Xray completed and is negative for osteomyelitis.  Dr. Lynn is involved    Diagnosed with diabetes 20 years ago, and is currently managing with Lantus only at night.  Checks blood sugars 1 time per day and reports that these typically average 100-120.  Has had previous diabetes education.  Does have numbness to feet.  Checks feet weekly. Usually wears nonprescriptive tennis shoes.  Has never been fitted for diabetic shoes or inserts.  Has not had previous foot surgeries.  Current occupation: Retired.        Smoking:   reports that he quit smoking about 6 years ago. His smoking use included cigarettes. He has a 55.00 pack-year smoking history. He has never used smokeless tobacco.    Alcohol:   reports no history of alcohol use.    Drug:   reports no history of drug use.      PAST MEDICAL HISTORY:   Past Medical History:   Diagnosis Date   • CAD (coronary artery disease)      stents   • Chronic kidney disease (CKD), stage V (HCC)    • Diabetes    • Hypertension    • Osteoarthritis    • Peripheral neuropathy         PAST SURGICAL HISTORY:   Past Surgical History:   Procedure Laterality Date   • APPENDECTOMY     • OTHER CARDIAC SURGERY      stents x1   • OTHER ORTHOPEDIC SURGERY      knee surgery       MEDICATIONS:   Current Facility-Administered Medications   Medication Dose   • methimazole (TAPAZOLE) tablet 5 mg  5 mg   • doxycycline monohydrate (ADOXA) tablet 100 mg  100 mg   • lidocaine (XYLOCAINE) 2 % injection 20 mL  20 mL   • senna-docusate (PERICOLACE or SENOKOT S) 8.6-50 MG per tablet 2 Tab  2 Tab    And   • polyethylene glycol/lytes (MIRALAX) PACKET 1 Packet  1 Packet    And   • magnesium hydroxide (MILK OF MAGNESIA) suspension 30 mL  30 mL    And   • bisacodyl (DULCOLAX) suppository 10 mg  10 mg   • acetaminophen (TYLENOL) tablet 650 mg  650 mg   • ampicillin/sulbactam (UNASYN) 3 g in  mL IVPB  3 g   • apixaban (ELIQUIS) tablet 2.5 mg  2.5 mg   • atorvastatin (LIPITOR) tablet 40 mg  40 mg   • carvedilol (COREG) tablet 3.125 mg  3.125 mg   • clopidogrel (PLAVIX) tablet 75 mg  75 mg   • omeprazole (PRILOSEC) capsule 20 mg  20 mg   • insulin glargine (LANTUS) injection 5 Units  5 Units    And   • insulin lispro (HUMALOG) injection 2-9 Units  2-9 Units    And   • glucose 4 g chewable tablet 16 g  16 g    And   • dextrose 50% (D50W) injection 50 mL  50 mL       ALLERGIES:  No Known Allergies     FAMILY HISTORY:   Family History   Problem Relation Age of Onset   • Heart Disease Mother    • Alcohol/Drug Father    • Thyroid Son    • Diabetes Brother    • Hypertension Neg Hx    • Hyperlipidemia Neg Hx          REVIEW OF SYSTEMS:   Constitutional: Negative for chills, fever   Respiratory: Negative for cough and shortness of breath.    Cardiovascular:Negative for chest pain, and claudication.   Gastrointestinal: Negative for constipation, diarrhea, nausea and vomiting.   Lower  extremities: positive for swelling and redness to left foot  Neurological: positive for numbness to feet and lower legs  All other systems reviewed and are negative     RESULTS:     Recent Labs     04/21/20  1720 04/22/20  0037   WBC 8.3 8.6   RBC 3.64* 3.40*   HEMOGLOBIN 11.7* 10.9*   HEMATOCRIT 34.3* 31.8*   MCV 94.2 93.5   MCH 32.1 32.1   MCHC 34.1 34.3   RDW 49.7 49.4   PLATELETCT 150* 144*   MPV 9.3 9.4     Recent Labs     04/21/20  1720 04/22/20  0037   SODIUM 137 138   POTASSIUM 4.0 4.1   CHLORIDE 96 98   CO2 28 27   GLUCOSE 252* 268*   BUN 17 21         ESR:     Results from last 7 days   Lab Units 04/21/20  1720   SED RATE WESTERGREN 1526 mm/hour 25*       CRP:       Results from last 7 days   Lab Units 04/21/20  1720   C REACTIVE PROTEIN 4596 mg/dL 4.61*       X-ray: Left foot:1.  No radiographic evidence for osteomyelitis     2.  Gas within the soft tissues of the 1st digit consistent with known open ulcer     3.  osteoarthritis of the midfoot and forefoot     4.  Extensive atherosclerotic plaque    MRI: Ordered    Arterial studies:   4/21/2020:Evidence of calcified arteries on the left side make assessment unreliable.    Doppler waveforms of the common femoral and popliteal arteries are of high    amplitude and triphasic.    Doppler waveforms of the posterior tibial and dorsalis pedis arteries are    monophasic.    No toe brachial index can be obtained due to no flow detected by    photoplethysmographpy.      Left.    Ankle brachial index is within normal limits.   Doppler waveforms of the common femoral and popliteal arteries are of high    amplitude and triphasic.    Doppler waveforms of the posterior tibial and dorsalis pedis arteries are    hyperemic.    No toe brachial index can be obtained due to no flow detected by    photoplethysmographpy.      A1c:  Lab Results   Component Value Date/Time    HBA1C 5.8 (H) 04/01/2020 09:34 AM            Microbiology:  Results     Procedure Component Value Units  "Date/Time    BLOOD CULTURE [[id number]] Collected:  04/21/20 1720    Order Status:  Completed Specimen:  Blood from Peripheral Updated:  04/22/20 0831     Significant Indicator NEG     Source BLD     Site PERIPHERAL     Culture Result No Growth  Note: Blood cultures are incubated for 5 days and  are monitored continuously.Positive blood cultures  are called to the RN and reported as soon as  they are identified.      Narrative:       Per Hospital Policy: Only change Specimen Src: to \"Line\" if  specified by physician order.  No site indicated    BLOOD CULTURE [[id number]] Collected:  04/21/20 1710    Order Status:  Completed Specimen:  Blood from Peripheral Updated:  04/22/20 0831     Significant Indicator NEG     Source BLD     Site PERIPHERAL     Culture Result No Growth  Note: Blood cultures are incubated for 5 days and  are monitored continuously.Positive blood cultures  are called to the RN and reported as soon as  they are identified.      Narrative:       Per Hospital Policy: Only change Specimen Src: to \"Line\" if  specified by physician order.  Right Forearm/Arm           PHYSICAL EXAMINATION:     VITAL SIGNS: /72   Pulse 85   Temp 36.4 °C (97.6 °F) (Temporal)   Resp 16   Ht 1.651 m (5' 5\")   Wt 75.2 kg (165 lb 12.6 oz)   SpO2 96%   BMI 27.59 kg/m²       General Appearance:  Well developed, well nourished, in no acute distress  Pleasant    Lower Extremity Assessment:    Edema:   +3 pitting edema to left foot    Pulses:  R foot: Unable to palpate DP/PT.  With Doppler monophasic tones to PT/DP  L foot: Unable to palpate DP/PT.  With Doppler monophasic tones to PT/DP          Structural /mechanical changes:  Hammertoes to bilateral first and second toes, flexible    Sensory Assessment  Feet Insensate to light touch    Using monofilament patient able to sense 1/10 points to right foot and 1/10 points to left foot    Monofilament testing with a 10 gram force: sensation intact: decreased " bilaterally  Visual Inspection: Feet with maceration, ulcers, fissures.  Pedal pulses: decreased bilaterally        Wound Assessment:    Left great toe distal phalanx plantar aspect:  100 percent hard black eschar.  Measures 0.6 x 0.6 cm.  Area of discoloration underneath callus and ulcer together measures 1.5 x 1.5 cm  Erythema: Moderate amount extending to proximal phalanx  Drainage: None  Callus: Moderate to periwound  Odor: None  Tender with palpation    Ulcer left open to air.              ASSESSMENT AND PLAN:   Recent left great toe distal ulcer that became infected.  Complicated by diabetes, peripheral neuropathy, arterial insufficiency.  Negative for osteomyelitis on x-ray.  Follow arterial duplex, if revascularization is not needed we will plan for left FHL tenotomy and excisional debridement of ulcer.      Wound care:   -Wound care orders placed for nursing    Imaging/Labs:  -Cancel MRI left foot  - arterial duplex BLE ordered    Vascular status:   -ABIs only completed.  Unable to complete TBI due to not being able to obtain a PPG.  Indicative of pedal arterial disease.  Unable to palpate pedal pulses bilaterally.  With Doppler monophasic to pedal pulses bilaterally.  -Follow arterial duplex and consult vascular surgery- D/W Dr. Howell    Surgery:   -We will plan for left FHL tenotomy and excisional debridement of left great toe ulcer at bedside     Antibiotics:   -Continue on Unasyn and doxycycline.  Managed by [facility] internal medicine    Weight Bearing Status:   -Heel weight bearing to LLE    Offloading:   -Diabetic boot ordered    PT/OT:   -No consultation at this time      Diabetes Education:   -Declines consultation.  A1c 5.8% in April 2020    - Implications of loss of protective sensation (LOPS) discussed with patient- including increased risk for amputation.  Advised to check feet at least daily, moisturize feet, and to always wear protective foot wear.   -avoid trimming own nails. See podiatrist or  certified foot and nail RN  -keep blood sugars <150 for improved wound healing        D/W: pt, RN, Dr. Lynn, Dr. Ortiz,       Discharge Plan:  Lives in [place].  Has support from ex-wife.    Follow up: Referral placed for wound care clinic and LPS rounds 5/8/2020.  Will benefit from Rx to orthotic company for diabetic shoes and inserts.      Please note that this dictation was created using voice recognition software. I have  worked with technical experts from [employer] to optimize the interface.  I have made every reasonable attempt to correct obvious errors, but there may be errors of grammar and possibly content that I did not discover before finalizing the note.

## 2020-04-22 NOTE — ASSESSMENT & PLAN NOTE
Stable   -Hemoglobin 11.7 on admission ; normal MCV normocytic anemia   -Likely secondary to end-stage renal disease

## 2020-04-22 NOTE — ED PROVIDER NOTES
ED Provider Note    CHIEF COMPLAINT  Chief Complaint   Patient presents with   • Sent by MD   • Foot Problem       HPI  Luis Sepulveda is a 74 y.o. male who presents with unhealing wound, worsening in the last 2 days on the tip of his left great toe.  In the last 2 days is developed worsening redness swelling and pain to the left foot ankle extending to the left calf.  Patient has history of diabetes with neuropathy, states his toes are numb usually.  He called his podiatrist and discussed the case with him and was told to come to emergency department for IV antibiotics stating care for this would be difficult as an outpatient.  No chest pain, no dizziness.  He denies fever chills or sweats.  Patient is also chronic dialysis patient.  He denies chest pain or cough.    REVIEW OF SYSTEMS    Constitutional: No chills or fever  Respiratory: No shortness of breath or cough  Cardiac: Denies chest pain  Gastrointestinal: No abdominal pain or vomiting  Musculoskeletal: Left ankle and foot pain  Neurologic: Diabetic neuropathy  Skin: Wound tip of left great toe, redness and swelling to the left foot and ankle  Endocrine: Diabetes     All other systems are negative.       PAST MEDICAL HISTORY  Past Medical History:   Diagnosis Date   • CAD (coronary artery disease)     stents   • Chronic kidney disease (CKD), stage V (HCC)    • Diabetes    • Hypertension    • Osteoarthritis    • Peripheral neuropathy        FAMILY HISTORY  Family History   Problem Relation Age of Onset   • Heart Disease Mother    • Alcohol/Drug Father    • Thyroid Son    • Diabetes Brother    • Hypertension Neg Hx    • Hyperlipidemia Neg Hx        SOCIAL HISTORY  Social History     Socioeconomic History   • Marital status:      Spouse name: Not on file   • Number of children: Not on file   • Years of education: Not on file   • Highest education level: Not on file   Occupational History   • Not on file   Social Needs   • Financial resource  strain: Not on file   • Food insecurity     Worry: Not on file     Inability: Not on file   • Transportation needs     Medical: Not on file     Non-medical: Not on file   Tobacco Use   • Smoking status: Former Smoker     Packs/day: 1.00     Years: 55.00     Pack years: 55.00     Types: Cigarettes     Last attempt to quit: 2014     Years since quittin.2   • Smokeless tobacco: Never Used   Substance and Sexual Activity   • Alcohol use: No   • Drug use: No   • Sexual activity: Not on file   Lifestyle   • Physical activity     Days per week: Not on file     Minutes per session: Not on file   • Stress: Not on file   Relationships   • Social connections     Talks on phone: Not on file     Gets together: Not on file     Attends Church service: Not on file     Active member of club or organization: Not on file     Attends meetings of clubs or organizations: Not on file     Relationship status: Not on file   • Intimate partner violence     Fear of current or ex partner: Not on file     Emotionally abused: Not on file     Physically abused: Not on file     Forced sexual activity: Not on file   Other Topics Concern   • Not on file   Social History Narrative   • Not on file       SURGICAL HISTORY  Past Surgical History:   Procedure Laterality Date   • APPENDECTOMY     • OTHER CARDIAC SURGERY      stents x1   • OTHER ORTHOPEDIC SURGERY      knee surgery       CURRENT MEDICATIONS  Home Medications     Reviewed by Cierra Guardado R.N. (Registered Nurse) on 20 at 2114  Med List Status: Complete   Medication Last Dose Status   apixaban (ELIQUIS) 2.5mg Tab 2020 Active   atorvastatin (LIPITOR) 40 MG Tab 2020 Active   calcitRIOL (ROCALTROL) 0.25 MCG Cap 2020 Active   calcium acetate (PHOS-LO) 667 MG Cap 2020 Active   carvedilol (COREG) 3.125 MG Tab 2020 Active   clopidogrel (PLAVIX) 75 MG Tab 2020 Active   insulin glargine (LANTUS SOLOSTAR) 100 UNIT/ML Solution Pen-injector injection  "4/20/2020 Active   methimazole (TAPAZOLE) 5 MG Tab 4/20/2020 Active   ofloxacin (OCUFLOX) 0.3 % Solution 4/21/2020 Active   pantoprazole (PROTONIX) 40 MG Tablet Delayed Response 4/20/2020 Active                ALLERGIES  No Known Allergies    PHYSICAL EXAM  VITAL SIGNS: BP (!) 162/54 Comment: RN notified  Pulse 99   Temp 37.3 °C (99.1 °F) (Temporal)   Resp 18   Ht 1.651 m (5' 5\")   Wt 75.2 kg (165 lb 12.6 oz)   SpO2 89%   BMI 27.59 kg/m²   Constitutional:  Non-toxic appearance.   HENT: No facial swelling  Eyes: Anicteric, no conjunctivitis.     Cardiovascular: Normal heart rate, Normal rhythm.  No pulse palpable dorsum left foot, slightly cool compared to warmth of the calf adjacent.  Pulmonary:  No wheezing, No rales.  Equal air movement   Gastrointestinal: Soft, No tenderness, No distention  Skin: Warm, Dry, No cyanosis.  Cellulitic change left foot and ankle with swelling the left leg compared to right.  There is an eschar tip of the left great toe with surrounding pallor.  Neurologic: Speech is clear, follows commands, facial expression is symmetrical.  Psychiatric: Affect normal,  Mood normal.  Patient is calm and cooperative  Musculoskeletal: Tenderness left foot, no crepitance or deformity    EKG/Labs  Results for orders placed or performed during the hospital encounter of 04/21/20   CBC WITH DIFFERENTIAL   Result Value Ref Range    WBC 8.3 4.8 - 10.8 K/uL    RBC 3.64 (L) 4.70 - 6.10 M/uL    Hemoglobin 11.7 (L) 14.0 - 18.0 g/dL    Hematocrit 34.3 (L) 42.0 - 52.0 %    MCV 94.2 81.4 - 97.8 fL    MCH 32.1 27.0 - 33.0 pg    MCHC 34.1 33.7 - 35.3 g/dL    RDW 49.7 35.9 - 50.0 fL    Platelet Count 150 (L) 164 - 446 K/uL    MPV 9.3 9.0 - 12.9 fL    Neutrophils-Polys 76.90 (H) 44.00 - 72.00 %    Lymphocytes 13.30 (L) 22.00 - 41.00 %    Monocytes 9.20 0.00 - 13.40 %    Eosinophils 0.00 0.00 - 6.90 %    Basophils 0.20 0.00 - 1.80 %    Immature Granulocytes 0.40 0.00 - 0.90 %    Nucleated RBC 0.00 /100 WBC    " Neutrophils (Absolute) 6.37 1.82 - 7.42 K/uL    Lymphs (Absolute) 1.10 1.00 - 4.80 K/uL    Monos (Absolute) 0.76 0.00 - 0.85 K/uL    Eos (Absolute) 0.00 0.00 - 0.51 K/uL    Baso (Absolute) 0.02 0.00 - 0.12 K/uL    Immature Granulocytes (abs) 0.03 0.00 - 0.11 K/uL    NRBC (Absolute) 0.00 K/uL   COMP METABOLIC PANEL   Result Value Ref Range    Sodium 137 135 - 145 mmol/L    Potassium 4.0 3.6 - 5.5 mmol/L    Chloride 96 96 - 112 mmol/L    Co2 28 20 - 33 mmol/L    Anion Gap 13.0 7.0 - 16.0    Glucose 252 (H) 65 - 99 mg/dL    Bun 17 8 - 22 mg/dL    Creatinine 2.81 (H) 0.50 - 1.40 mg/dL    Calcium 9.1 8.5 - 10.5 mg/dL    AST(SGOT) 14 12 - 45 U/L    ALT(SGPT) 13 2 - 50 U/L    Alkaline Phosphatase 141 (H) 30 - 99 U/L    Total Bilirubin 0.5 0.1 - 1.5 mg/dL    Albumin 3.8 3.2 - 4.9 g/dL    Total Protein 6.7 6.0 - 8.2 g/dL    Globulin 2.9 1.9 - 3.5 g/dL    A-G Ratio 1.3 g/dL   ESTIMATED GFR   Result Value Ref Range    GFR If  27 (A) >60 mL/min/1.73 m 2    GFR If Non African American 22 (A) >60 mL/min/1.73 m 2   CRP QUANTITIVE (NON-CARDIAC)   Result Value Ref Range    Stat C-Reactive Protein 4.61 (H) 0.00 - 0.75 mg/dL       RADIOLOGY/PROCEDURES  DX-FOOT-2- LEFT   Final Result      1.  No radiographic evidence for osteomyelitis      2.  Gas within the soft tissues of the 1st digit consistent with known open ulcer      3.  osteoarthritis of the midfoot and forefoot      4.  Extensive atherosclerotic plaque      US-PATSY SINGLE LEVEL BILAT   Final Result      US-EXTREMITY ARTERY LOWER UNILAT LEFT    (Results Pending)         COURSE & MEDICAL DECISION MAKING  Pertinent Labs & Imaging studies reviewed. (See chart for details)  Poorly palpable pulses left foot prompted arterial ultrasound, the showed monophasic flow in the left foot, triphasic flow down to the left knee, please refer to the reading for further.  Foot not acutely ischemic at this time.  Suspect infection secondary to the diabetic ulceration of the left  first toe in the setting of both diabetes as well as peripheral vascular disease of the left foot.  Patient started on IV antibiotics, will be hospitalized for continuing therapy and specialty consultation as indicated.  Patient was recently dialyzed, his potassium is normal at this time.    FINAL IMPRESSION     1. Cellulitis of left foot     2. Diabetic ulcer of toe of left foot associated with diabetes mellitus due to underlying condition, with other ulcer severity (HCC)     3. Chronic renal failure, stage 5 (HCC)                     Electronically signed by: Patrice Brito M.D., 4/21/2020 10:01 PM

## 2020-04-22 NOTE — ASSESSMENT & PLAN NOTE
-Patient has a history of end-stage renal disease, on hemodialysis (on Tue/Thu/Sat)  -Follows up with Dr. [facility].  -No symptoms of uremia or volume overload.  -Cr 2.81 , BUN 17, K 4 on admission  -HD T/T/Sa  -Nephrology following

## 2020-04-22 NOTE — H&P
History & Physical Note    Date of Admission: 4/21/2020  Admission Status: Inpatient  [facility] Team: [facility] Blue Team  Attending:   Senior Resident: Dr. Callejas  Intern: Dr. Perez  Contact Number: [phone]    Chief Complaint: Pain and swelling in left for big toe    History of Present Illness (HPI): Mr. Sepulveda is a 74-year-old male with PMHx of ESRD (on hemodialysis), CAD status post stents in proximal and mid LAD, IDDM, atrial fibrillation on apixaban, hyperthyroidism and GERD.  He was sent by his primary care physician after noticing redness and swelling in left leg with wound over left foot big toe. Per patient, he accidentally noticed a small wound on the tip/plantar surface of the left big toe 2 days ago; denies any injury in left foot. The wound has not been changing in size and does not have any serious/bloody discharge but the skin around the wound started getting red yesterday and today he had redness and swelling in the whole left foot up to distal part of the leg which prompted him to visit PCP; denies fever/chills or pain in the left foot or cramping in the left leg on ambulation. He has not tried any oral antibiotic treatment.  He has been on insulin for type II DM and reported to be compliant with treatment without any previous episodes of foot ulcer/cellulitis. He completed his hemodialysis session today.  He denies any cardiopulmonary, GI or  symptoms; denies weakness in lower extremities but reported to have decreased sensation and neuropathy in both lower extremities.     In ED, his vital signs are within normal limits.  He is afebrile. on exam, patient has 1-2 cm round wound on the plantar surface of big toe near tip, with dried black base without any discharge, surrounded by erythema up to above the ankle. CBC did not show leukocytosis; low Hb of 11.7.  CHEM panel showed elevated creatinine with normal electrolytes; elevated CRP 4.51. X-ray left foot did not show signs of  osteomyelitis; positive for gas within the soft tissues of the 1st digit       Review of Systems:   Review of Systems   Constitutional: Negative for chills, fever, malaise/fatigue and weight loss.   HENT: Negative for congestion, ear discharge, ear pain, nosebleeds and sore throat.    Eyes: Negative for blurred vision, double vision and photophobia.   Respiratory: Negative for cough, sputum production, shortness of breath and stridor.    Cardiovascular: Negative for chest pain, palpitations, orthopnea and leg swelling.   Gastrointestinal: Negative for abdominal pain, blood in stool, heartburn, nausea and vomiting.   Genitourinary: Negative for dysuria and hematuria.   Musculoskeletal: Negative for joint pain and myalgias.        Pain and swelling in left foot great toe   Skin: Negative for itching and rash.   Neurological: Negative for dizziness, sensory change, speech change, focal weakness, seizures, loss of consciousness and headaches.   Psychiatric/Behavioral: Negative for depression and memory loss.         Past Medical History:   Past Medical History was reviewed with patient.   has a past medical history of CAD (coronary artery disease), Chronic kidney disease (CKD), stage V (HCC), Diabetes, Hypertension, Osteoarthritis, and Peripheral neuropathy.    Past Surgical History: Past Surgical History was reviewed with patient.   has a past surgical history that includes appendectomy; other orthopedic surgery; and other cardiac surgery.    Medications: Medications have been reviewed with patient.  Prior to Admission Medications   Prescriptions Last Dose Informant Patient Reported? Taking?   apixaban (ELIQUIS) 2.5mg Tab   Yes No   Sig: Take 2.5 mg by mouth 2 Times a Day.   atorvastatin (LIPITOR) 40 MG Tab  Patient No No   Sig: Take 1 Tab by mouth every bedtime.   calcitRIOL (ROCALTROL) 0.25 MCG Cap   Yes No   calcium acetate (PHOS-LO) 667 MG Cap  Patient Yes No   Sig: Take 1,334 mg by mouth 3 times a day, with meals.    carvedilol (COREG) 3.125 MG Tab   Yes No   Sig: Take 3.125 mg by mouth 2 times a day, with meals.   clopidogrel (PLAVIX) 75 MG Tab   No No   Sig: Take 1 Tab by mouth every day.   diphenhydrAMINE (BENADRYL) 25 MG Tab  Patient Yes No   Sig: Take 25 mg by mouth every 6 hours as needed for Itching.   insulin glargine (LANTUS SOLOSTAR) 100 UNIT/ML Solution Pen-injector injection   Yes No   Sig: Inject 5 Units as instructed every bedtime.   methimazole (TAPAZOLE) 5 MG Tab   No No   Sig: Take 1.5 tabs in am, and 1 tab in evening   ofloxacin otic sol (FLOXIN OTIC) 0.3 % Solution   No No   Sig: Place 5 Drops in ear every day. Administer drops to both ears.   pantoprazole (PROTONIX) 40 MG Tablet Delayed Response  Patient Yes No   Sig: Take 40 mg by mouth every bedtime.      Facility-Administered Medications: None        Allergies: Allergies have been reviewed with patient.  No Known Allergies    Family History:   family history includes Alcohol/Drug in his father; Diabetes in his brother; Heart Disease in his mother; Thyroid in his son.     Social History:   Tobacco: None  Alcohol: None  Recreational drugs (illegal and prescription):  None  Employment: None  Living situation:  Lives with ex-wife  Recent travel:  None  Primary Care Provider: reviewed Patito Edgar M.D.  Other (stressors, spirituality, exposures):  None    Physical Exam:  Vitals:  Temp:  [37.3 °C (99.1 °F)-37.3 °C (99.2 °F)] 37.3 °C (99.1 °F)  Pulse:  [] 99  Resp:  [16-24] 18  BP: (118-162)/(54-73) 162/54  SpO2:  [89 %-100 %] 89 %    Physical Exam  Constitutional:       General: He is not in acute distress.     Appearance: Normal appearance. He is normal weight. He is not ill-appearing or toxic-appearing.   HENT:      Head: Normocephalic and atraumatic.      Nose: Nose normal. No congestion or rhinorrhea.      Mouth/Throat:      Mouth: Mucous membranes are moist.      Pharynx: No oropharyngeal exudate or posterior oropharyngeal erythema.       Comments: Mallampati score class I  Eyes:      Extraocular Movements: Extraocular movements intact.      Conjunctiva/sclera: Conjunctivae normal.      Pupils: Pupils are equal, round, and reactive to light.   Neck:      Musculoskeletal: Normal range of motion and neck supple. No neck rigidity or muscular tenderness.   Cardiovascular:      Rate and Rhythm: Normal rate and regular rhythm.      Pulses: Normal pulses.      Heart sounds: Normal heart sounds. No murmur. No friction rub. No gallop.    Pulmonary:      Effort: Pulmonary effort is normal. No respiratory distress.      Breath sounds: Normal breath sounds. No wheezing or rales.   Abdominal:      General: Abdomen is flat. Bowel sounds are normal. There is no distension.      Palpations: Abdomen is soft.      Tenderness: There is no abdominal tenderness. There is no guarding.   Genitourinary:     Comments: Refused JOSE  Musculoskeletal: Normal range of motion.         General: Swelling present.      Left lower leg: Edema (edema in left foot uptothe ankle) present.   Skin:     General: Skin is warm.      Coloration: Skin is not jaundiced.      Findings: Erythema (in left foot up to the ankle, more aroud great toe) present. No bruising or lesion.      Comments: 1 cm round wound on the planter surface of the great toe near tip, with dried black base without any discharge, surrounded by erythema and mild fluctuance on palpation   Neurological:      General: No focal deficit present.      Mental Status: He is alert and oriented to person, place, and time. Mental status is at baseline.      Motor: No weakness.   Psychiatric:         Mood and Affect: Mood normal.         Behavior: Behavior normal.       Labs:     Recent Labs     04/21/20  1720   WBC 8.3   RBC 3.64*   HEMOGLOBIN 11.7*   HEMATOCRIT 34.3*   MCV 94.2   MCH 32.1   RDW 49.7   PLATELETCT 150*   MPV 9.3   NEUTSPOLYS 76.90*   LYMPHOCYTES 13.30*   MONOCYTES 9.20   EOSINOPHILS 0.00   BASOPHILS 0.20       Recent  Labs     04/21/20  1720   SODIUM 137   POTASSIUM 4.0   CHLORIDE 96   CO2 28   GLUCOSE 252*   BUN 17         EKG: Per my read, sinus rhythm with RBBB; no atrial fibrillation. No acute ST-T wave changes    Imaging:     DX-FOOT-2- LEFT   Final Result      1.  No radiographic evidence for osteomyelitis      2.  Gas within the soft tissues of the 1st digit consistent with known open ulcer      3.  osteoarthritis of the midfoot and forefoot      4.  Extensive atherosclerotic plaque      US-PATSY SINGLE LEVEL BILAT   Final Result      US-EXTREMITY ARTERY LOWER UNILAT LEFT    (Results Pending)         Previous Data Review: reviewed     * Cellulitis of left foot- (present on admission)  Assessment & Plan  -Patient presented with small 1 cm wound in the left foot big toe and progressive erythema as well as swelling in left foot for the past 2 days; does not complain of pain in the foot  -On exam, patient has 1 cm round wound on the plantar surface of big toe near tip, with dried black base without any discharge, surrounded by erythema up to above the ankle  -No systemic signs of infection like fever/chills or leukocytosis  -Vital signs are WNL on admission; elevated CRP 4.51  -X-ray left foot did not show signs of osteomyelitis; positive for gas within the soft tissues of the 1st digit     -Admitted inpatient for further management  -Continue diabetic diet  -Continue IV Unasyn and IV vancomycin  -Tight blood sugar control; continue Lantus 5 units and ISS with hypoglycemia protocol with Accu-Cheks  -Follow blood cultures  -IP consult to LPS    Diabetes mellitus type 2 in nonobese (HCC)  Assessment & Plan  -Patient has a history of type 2 diabetes mellitus with ESRD and peripheral neuropathy  -Last HbA1c 3 weeks ago was 5.8  -On Lantus 5 units once daily at home  -Blood sugar 252 on admission  -Continue diabetic diet  -Tight blood sugar control; continue Lantus 5 units and ISS with hypoglycemia protocol with  Accu-Cheks      Paroxysmal A-fib (HCC)- (present on admission)  Assessment & Plan  -Patient has a history of atrial fibrillation   -EKG on admission showed sinus rhythm with RBBB; no atrial fibrillation   -CHDVASc2 score is 5 ; candidate for OAC   -continue Apixaban and carvedilol    End stage renal disease on dialysis (HCC)- (present on admission)  Assessment & Plan  -Patient has a history of end-stage renal disease, on hemodialysis (on Tue/Thu/Sat)  -Follows up with Dr. [facility].  -No symptoms of uremia or volume overload.  -Cr 2.81 , BUN 17, K 4 on admission  -had HD today  -Consider nephrology consult and hemodialysis while inpatient      Anemia of chronic disease- (present on admission)  Assessment & Plan  -Hemoglobin 11.7 on admission ; normal MCV normocytic anemia   -Likely secondary to end-stage renal disease         CAD (coronary artery disease)- (present on admission)  Assessment & Plan  -History of coronary artery disease status post stents in proximal and mid LAD 5-6 yrs ago  -Denied any cardiopulmonary symptoms after admission   -Vital signs are within normal limits   -Continue clopidogrel, statin, beta-blocker       Hypothyroidism- (present on admission)  Assessment & Plan  -Patient has a history of hypothyroidism  -On methimazole at home  -Low TSH and high T4 of 2.55  -Increase methimazole to 5 mg 3 times daily  -Follow-up with labs outpatient    Dyslipidemia- (present on admission)  Assessment & Plan  -Patient has a history of dyslipidemia   -continue Atorvastatin 40 mg daily

## 2020-04-22 NOTE — CONSULTS
"[facility] -  CONSULTATION NOTE               Author: Marvin Mahajan M.D. Date & Time: 4/22/2020  9:40 AM       REASON FOR CONSULTATION:   - Inpatient hemodialysis management.    CHIEF COMPLAINT:   -  \"My left toe is hurting\"    HISTORY OF PRESENT ILLNESS:    75 yo M PMH ESRD TTS iHD, HTN, type 2 DM, anemia of CKD, and CKD-MBD here for CC as above.  He was sent to ED by his PCP after a visit with them where they noted redness/swelling over his left great toe.  He had pain at the site and around it as well.  He initially noted a small wound over the plantar surface of the toe about two days ago; he denies any trauma to that area.  The size of the wound has not enlarged and no bloody discharge noted either.  The pain and swelling worsened and started tracking up the leg so he went to his PCP yesterday and started down this cascade.  He has not tried any medications for it.  No aggravating or alleviating factors.  No other hx of foot ulcers in past.  He otherwise denies F/C/N/V/CP/SOB.  No melena, hematochezia, hematemesis.  No HA, visual changes, or abdominal pain.  In the ED imaging was done and foot XR did not show any OM signs, but did show possibly some gas within soft tissues and so he was admitted to inpatient service.      REVIEW OF SYSTEMS:    Review of Systems   Constitutional: Negative for fever and malaise/fatigue.   HENT: Negative for ear pain and sore throat.    Eyes: Negative for pain and redness.   Respiratory: Negative for cough and hemoptysis.    Cardiovascular: Positive for leg swelling. Negative for chest pain.   Gastrointestinal: Negative for abdominal pain and nausea.   Musculoskeletal: Positive for joint pain. Negative for myalgias.   Skin: Negative for itching and rash.   Neurological: Negative for dizziness and headaches.   Endo/Heme/Allergies: Negative for polydipsia. Does not bruise/bleed easily.   Psychiatric/Behavioral: Negative for depression and hallucinations. " "  All other systems reviewed and are negative.    PAST MEDICAL HISTORY:   - ESRD TTS iHD  - HTN  - type 2 DM  - Anemia of CKD  - CKD-MBD  - HLD  - CAD  - Hyperthyroidism    PAST SURGICAL HISTORY:   - Appendectomy  - Knee surgery  - RAVF  - Hernia repair with mesh  - Coronary stent    FAMILY HISTORY:   - No CKD/ESRD that he is aware of  - Otherwise reviewed and non contributory to current illness    SOCIAL HISTORY:   - 1 ppd x 50 yrs, now quit for a few years  - No EtOH  - No illicits    HOME MEDICATIONS:   - Reviewed and documented in chart    LABORATORY STUDIES:   - Reviewed and documented in chart    ALLERGIES:  Patient has no known allergies.    VS:  /72   Pulse 85   Temp 36.4 °C (97.6 °F) (Temporal)   Resp 16   Ht 1.651 m (5' 5\")   Wt 75.2 kg (165 lb 12.6 oz)   SpO2 96%   BMI 27.59 kg/m²   Physical Exam  Vitals signs and nursing note reviewed.   Constitutional:       General: He is not in acute distress.     Appearance: Normal appearance.   HENT:      Head: Normocephalic and atraumatic.      Right Ear: External ear normal.      Left Ear: External ear normal.      Nose: Nose normal. No congestion.      Mouth/Throat:      Mouth: Mucous membranes are moist.      Pharynx: No oropharyngeal exudate.   Eyes:      General:         Right eye: No discharge.         Left eye: No discharge.      Extraocular Movements: Extraocular movements intact.   Neck:      Musculoskeletal: Normal range of motion and neck supple. No muscular tenderness.   Cardiovascular:      Rate and Rhythm: Normal rate and regular rhythm.      Heart sounds: Normal heart sounds. No murmur.   Pulmonary:      Effort: Pulmonary effort is normal.      Breath sounds: Normal breath sounds.   Abdominal:      General: Bowel sounds are normal.      Palpations: Abdomen is soft.   Musculoskeletal:         General: Swelling (Left great toe) and deformity (Open wound left great toe) present. No tenderness.      Left lower leg: Edema present.   Skin:     " General: Skin is warm and dry.      Findings: Erythema present. No bruising.   Neurological:      Mental Status: He is alert and oriented to person, place, and time. Mental status is at baseline.      Motor: No weakness.   Psychiatric:         Mood and Affect: Mood normal.         Behavior: Behavior normal.       LABS:  Recent Results (from the past 24 hour(s))   BLOOD CULTURE    Collection Time: 04/21/20  5:10 PM   Result Value Ref Range    Significant Indicator NEG     Source BLD     Site PERIPHERAL     Culture Result       No Growth  Note: Blood cultures are incubated for 5 days and  are monitored continuously.Positive blood cultures  are called to the RN and reported as soon as  they are identified.     CBC WITH DIFFERENTIAL    Collection Time: 04/21/20  5:20 PM   Result Value Ref Range    WBC 8.3 4.8 - 10.8 K/uL    RBC 3.64 (L) 4.70 - 6.10 M/uL    Hemoglobin 11.7 (L) 14.0 - 18.0 g/dL    Hematocrit 34.3 (L) 42.0 - 52.0 %    MCV 94.2 81.4 - 97.8 fL    MCH 32.1 27.0 - 33.0 pg    MCHC 34.1 33.7 - 35.3 g/dL    RDW 49.7 35.9 - 50.0 fL    Platelet Count 150 (L) 164 - 446 K/uL    MPV 9.3 9.0 - 12.9 fL    Neutrophils-Polys 76.90 (H) 44.00 - 72.00 %    Lymphocytes 13.30 (L) 22.00 - 41.00 %    Monocytes 9.20 0.00 - 13.40 %    Eosinophils 0.00 0.00 - 6.90 %    Basophils 0.20 0.00 - 1.80 %    Immature Granulocytes 0.40 0.00 - 0.90 %    Nucleated RBC 0.00 /100 WBC    Neutrophils (Absolute) 6.37 1.82 - 7.42 K/uL    Lymphs (Absolute) 1.10 1.00 - 4.80 K/uL    Monos (Absolute) 0.76 0.00 - 0.85 K/uL    Eos (Absolute) 0.00 0.00 - 0.51 K/uL    Baso (Absolute) 0.02 0.00 - 0.12 K/uL    Immature Granulocytes (abs) 0.03 0.00 - 0.11 K/uL    NRBC (Absolute) 0.00 K/uL   COMP METABOLIC PANEL    Collection Time: 04/21/20  5:20 PM   Result Value Ref Range    Sodium 137 135 - 145 mmol/L    Potassium 4.0 3.6 - 5.5 mmol/L    Chloride 96 96 - 112 mmol/L    Co2 28 20 - 33 mmol/L    Anion Gap 13.0 7.0 - 16.0    Glucose 252 (H) 65 - 99 mg/dL    Bun  17 8 - 22 mg/dL    Creatinine 2.81 (H) 0.50 - 1.40 mg/dL    Calcium 9.1 8.5 - 10.5 mg/dL    AST(SGOT) 14 12 - 45 U/L    ALT(SGPT) 13 2 - 50 U/L    Alkaline Phosphatase 141 (H) 30 - 99 U/L    Total Bilirubin 0.5 0.1 - 1.5 mg/dL    Albumin 3.8 3.2 - 4.9 g/dL    Total Protein 6.7 6.0 - 8.2 g/dL    Globulin 2.9 1.9 - 3.5 g/dL    A-G Ratio 1.3 g/dL   BLOOD CULTURE    Collection Time: 20  5:20 PM   Result Value Ref Range    Significant Indicator NEG     Source BLD     Site PERIPHERAL     Culture Result       No Growth  Note: Blood cultures are incubated for 5 days and  are monitored continuously.Positive blood cultures  are called to the RN and reported as soon as  they are identified.     ESTIMATED GFR    Collection Time: 20  5:20 PM   Result Value Ref Range    GFR If  27 (A) >60 mL/min/1.73 m 2    GFR If Non African American 22 (A) >60 mL/min/1.73 m 2   CRP QUANTITIVE (NON-CARDIAC)    Collection Time: 20  5:20 PM   Result Value Ref Range    Stat C-Reactive Protein 4.61 (H) 0.00 - 0.75 mg/dL   Sed Rate    Collection Time: 20  5:20 PM   Result Value Ref Range    Sed Rate Westergren 25 (H) 0 - 20 mm/hour   EKG    Collection Time: 20  9:14 PM   Result Value Ref Range    Report       [facility]    Test Date:  2020  Pt Name:    KLARISSA BRADSHAW              Department:   MRN:        [id number]                      Room:       [facility]  Gender:     Male                         Technician: MORENO  :        1945                   Requested By:FLOR SALEH  Order #:    [id number]                    Reading MD:    Measurements  Intervals                                Axis  Rate:       89                           P:          78  NE:         195                          QRS:        -70  QRSD:       145                          T:          65  QT:         411  QTc:        501    Interpretive Statements  SINUS RHYTHM  RBBB AND LAFB  Compared to ECG 2020 21:25:45  No  significant changes     ACCU-CHEK GLUCOSE    Collection Time: 04/21/20 11:26 PM   Result Value Ref Range    Glucose - Accu-Ck 235 (H) 65 - 99 mg/dL   Basic Metabolic Panel (BMP)    Collection Time: 04/22/20 12:37 AM   Result Value Ref Range    Sodium 138 135 - 145 mmol/L    Potassium 4.1 3.6 - 5.5 mmol/L    Chloride 98 96 - 112 mmol/L    Co2 27 20 - 33 mmol/L    Glucose 268 (H) 65 - 99 mg/dL    Bun 21 8 - 22 mg/dL    Creatinine 3.19 (H) 0.50 - 1.40 mg/dL    Calcium 8.8 8.5 - 10.5 mg/dL    Anion Gap 13.0 7.0 - 16.0   CBC with Differential    Collection Time: 04/22/20 12:37 AM   Result Value Ref Range    WBC 8.6 4.8 - 10.8 K/uL    RBC 3.40 (L) 4.70 - 6.10 M/uL    Hemoglobin 10.9 (L) 14.0 - 18.0 g/dL    Hematocrit 31.8 (L) 42.0 - 52.0 %    MCV 93.5 81.4 - 97.8 fL    MCH 32.1 27.0 - 33.0 pg    MCHC 34.3 33.7 - 35.3 g/dL    RDW 49.4 35.9 - 50.0 fL    Platelet Count 144 (L) 164 - 446 K/uL    MPV 9.4 9.0 - 12.9 fL    Neutrophils-Polys 77.30 (H) 44.00 - 72.00 %    Lymphocytes 12.60 (L) 22.00 - 41.00 %    Monocytes 9.30 0.00 - 13.40 %    Eosinophils 0.30 0.00 - 6.90 %    Basophils 0.20 0.00 - 1.80 %    Immature Granulocytes 0.30 0.00 - 0.90 %    Nucleated RBC 0.00 /100 WBC    Neutrophils (Absolute) 6.66 1.82 - 7.42 K/uL    Lymphs (Absolute) 1.09 1.00 - 4.80 K/uL    Monos (Absolute) 0.80 0.00 - 0.85 K/uL    Eos (Absolute) 0.03 0.00 - 0.51 K/uL    Baso (Absolute) 0.02 0.00 - 0.12 K/uL    Immature Granulocytes (abs) 0.03 0.00 - 0.11 K/uL    NRBC (Absolute) 0.00 K/uL   ESTIMATED GFR    Collection Time: 04/22/20 12:37 AM   Result Value Ref Range    GFR If  23 (A) >60 mL/min/1.73 m 2    GFR If Non  19 (A) >60 mL/min/1.73 m 2   ACCU-CHEK GLUCOSE    Collection Time: 04/22/20  8:35 AM   Result Value Ref Range    Glucose - Accu-Ck 142 (H) 65 - 99 mg/dL       (click the triangle to expand results)    IMAGING:  US-EXTREMITY VENOUS LOWER UNILAT LEFT   Final Result      EC-ECHOCARDIOGRAM COMPLETE W/O CONT          DX-FOOT-2- LEFT   Final Result      1.  No radiographic evidence for osteomyelitis      2.  Gas within the soft tissues of the 1st digit consistent with known open ulcer      3.  osteoarthritis of the midfoot and forefoot      4.  Extensive atherosclerotic plaque      US-PATSY SINGLE LEVEL BILAT   Final Result      US-EXTREMITY ARTERY LOWER UNILAT LEFT    (Results Pending)   MR-FOOT-WITH LEFT    (Results Pending)       IMPRESSION:  - ESRD    * Etiology likely 2/2 HTN/DM    * TTS iHD @ [facility]  - Left foot open wound    * Likely diabetic related  - HTN    * Goal BP < 140/90    * Stable  - Anemia of CKD    * Goal Hgb 10-11    * Stable  - CKD-MBD    * Managed at HD unit  - HLD    * On statin  - CAD    * On statin    PLAN:  - TTS iHD  - UF as tolerated  - Defer to primary team and LPS for wound care  - Continue home PO4 binders  - No dietary protein restrictions  - Abx per primary team, dosed appropriately for ESRD   - Dose all meds per ESRD    All prior notes form other doctors and RN staff were reviewed from admission to current day to help me make my clinical decisions    Thank you for the consultation!

## 2020-04-22 NOTE — RESPIRATORY CARE
COPD EDUCATION by COPD CLINICAL EDUCATOR  4/22/2020 at 7:35 AM by Sandra Bansal, RRT     Patient reviewed by COPD education team. Patient does not have a history or diagnosis of COPD and is a non-smoker, therefore does not qualify for the COPD program.

## 2020-04-22 NOTE — DISCHARGE PLANNING
Outpatient Dialysis Note    Confirmed patient is active at:    [facility]  [address]  [place] [address]    Schedule: Tuesday, Thursday, Saturday  Time: 06:15am    Spoke with at facility who confirmed.    Forwarded records for review.    Becca Bojorquez- Dialysis Coordinator  [facility] [employer] # [phone]

## 2020-04-22 NOTE — DISCHARGE SUMMARY
Discharge Summary    Date of Admission: 4/21/2020  Date of Discharge: 4/25/2020  Discharging Attending: Valdez White M.D.   Discharging Senior Resident: Dr. Callejas  Discharging Intern: Dr. Ortiz    CHIEF COMPLAINT ON ADMISSION  Chief Complaint   Patient presents with   • Sent by MD   • Foot Problem       Reason for Admission  Left foot big toe ulcer     Admission Date  4/21/2020    CODE STATUS  DNAR/DNI    HPI & HOSPITAL COURSE  This is a 74 y.o. male here with a past medical history of ESRD (on Tue/Thu/Sat hemodialysis), CAD status post stents in proximal and mid LAD, IDDM, atrial fibrillation on apixaban, hyperthyroidism and GERD. Patient was sent to the hospital by his podiatrist after noticing left big toe ulcer with associated erythema and swelling of his left foot and calf. Noticed initially 3 days back after he came out of the shower, worsened over time. Patient denied any fever, chills, pain and discharge. Patient was hemodynamically stable in the ER. CBC and CMP without any significant findings. ESR and CRP were elevated. Foot xray was negative for osteomyelitis, showed some gas consistent with open ulcer. Arterial duplex of the left leg was done that showed occlusion of the left posterior tibial artery and stenosis in the left proximal anterior tibial artery. Vascular surgery was consulted and he underwent left lower extremity angiography with let anterior tibial and left peroneal artery angioplasty and percutaneous thrombectomy of left peroneal artery. His left foot had improved perfusion and gradually decreased swelling. He was discharged with plans to follow up with vascular surgery outpatient.       Therefore, he is discharged in good and stable condition to home with close outpatient follow-up.    The patient met 2-midnight criteria for an inpatient stay at the time of discharge.    Discharge Date  4/25/2020    FOLLOW UP ITEMS POST DISCHARGE  Vascular surgery in 1 week    DISCHARGE  DIAGNOSES  Principal Problem:    Peripheral vascular disease (HCC) POA: Unknown  Active Problems:    Diabetes mellitus type 2 in nonobese (HCC) POA: Unknown    Ulcer of left foot POA: Yes    CAD (coronary artery disease) POA: Yes      Overview: Stents to proximal and mid LAD in [facility].    Anemia of chronic disease POA: Yes    End stage renal disease on dialysis (HCC) POA: Yes    Paroxysmal A-fib (HCC) POA: Yes    Dyslipidemia POA: Yes    Hyperthyroidism POA: Yes  Resolved Problems:    * No resolved hospital problems. *      FOLLOW UP  Future Appointments   Date Time Provider Department Center   5/11/2020 11:00 AM [address]   6/2/2020  2:20 PM Kulwant Osborn M.D. [facility] None     Rima Howell M.D.  [address]  [facility]  [place] [address]  [phone]    In 1 week        MEDICATIONS ON DISCHARGE     Medication List      CONTINUE taking these medications      Instructions   atorvastatin 40 MG Tabs  Commonly known as:  LIPITOR   Take 1 Tab by mouth every bedtime.  Dose:  40 mg     calcitRIOL 0.25 MCG Caps  Commonly known as:  ROCALTROL   Take 0.25 mcg by mouth every day.  Dose:  0.25 mcg     calcium acetate 667 MG Caps  Commonly known as:  PHOS-LO   Take 1,334 mg by mouth 3 times a day, with meals.  Dose:  1,334 mg     carvedilol 3.125 MG Tabs  Commonly known as:  COREG   Take 3.125 mg by mouth 2 times a day, with meals.  Dose:  3.125 mg     clopidogrel 75 MG Tabs  Commonly known as:  PLAVIX   Take 1 Tab by mouth every day.  Dose:  75 mg     Eliquis 2.5mg Tabs  Generic drug:  apixaban   Take 2.5 mg by mouth 2 Times a Day.  Dose:  2.5 mg     Lantus SoloStar 100 UNIT/ML Sopn injection  Generic drug:  insulin glargine   Inject 5 Units as instructed every bedtime.  Dose:  5 Units     methimazole 5 MG Tabs  Commonly known as:  TAPAZOLE   Take 1.5 tabs in am, and 1 tab in evening     Protonix 40 MG Tbec  Generic drug:  pantoprazole   Take 40 mg by mouth every bedtime.  Dose:   40 mg            Allergies  Allergies   Allergen Reactions   • Mircera [Methoxy Polyethylene Glycol-Epoetin Beta] Hives       DIET  Orders Placed This Encounter   Procedures   • Diet Order Diabetic, Renal     Standing Status:   Standing     Number of Occurrences:   1     Order Specific Question:   Diet:     Answer:   Diabetic [3]     Order Specific Question:   Diet:     Answer:   Renal [8]       ACTIVITY  As tolerated.  Weight bearing as tolerated    CONSULTATIONS  Dr. Hdez with Nephrology Service, Limb preservation and Dr. Howell with Vascular Surgery consulted. Treatment options were discussed and plan of care agreed upon.     PROCEDURES  LLE angiogram with angioplasty and thrombectomy

## 2020-04-22 NOTE — ASSESSMENT & PLAN NOTE
History of CAD s/p  stents in proximal and mid LAD 13yrs ago  -Continue clopidogrel, statin, beta-blocker

## 2020-04-22 NOTE — PROGRESS NOTES
Daily Progress Note:     Date of Service: 4/22/2020  Primary Team: [employer] [facility] Blue Team   Attending: Valdez White M.D.   Senior Resident: Dr. Callejas   Intern: Dr. Ortiz   Contact:  [phone]    Chief Complaint:   Left big toe ulcer    HPI:  73 YO male with a pmh of ESRD (on hemodialysis), CAD status post stents in proximal and mid LAD, IDDM, atrial fibrillation on apixaban, hyperthyroidism and GERD. Patient sent to the hospital by his podiatrist after noticing left big toe ulcer with associated erythema and swelling of his left foot and calf. Noticed first 3 days back after he came out of the shower, worsened over time. Patient denied any fever, chills, pain and discharge. Patient was hemodynamically stable in the ER. CBC and CMP w/o any significant findings. ESR and CRP were elevated. Xray foot was negative for OM, showed some gas consistent w/ open  Ulcer. Patient was admitted for further investigation of the ulcer and IV antibiotic management.         Subjective  -No acute overnight events reported. Pt in NAD. No active complaints.  -LPS consulted. MRI foot pending to r/o OM.  -Continue IV Unasyn and Oral Doxycycline.  -U/S arterial w/o evidence of peripheral artery disease.  -Venous U/S negative for DVT, superficial phlebitis.    Consultants/Specialty:  Limb Preservation  Nephrology    Review of Systems:          Review of Systems   Constitutional: Negative for chills, fever and malaise/fatigue.   HENT: Negative for congestion and ear pain.    Eyes: Negative for blurred vision, double vision and photophobia.   Respiratory: Negative for cough, sputum production, shortness of breath and stridor.    Cardiovascular: Negative for chest pain, palpitations, orthopnea and leg swelling.   Gastrointestinal: Negative for abdominal pain, blood in stool, heartburn, nausea and vomiting.   Genitourinary: Negative for dysuria and hematuria.   Musculoskeletal: Negative for joint pain and myalgias.   Skin: Negative for rash.    Neurological: Negative for dizziness, sensory change, speech change, focal weakness, seizures and headaches.   Psychiatric/Behavioral: Negative for depression and memory loss.       Objective Data:   Physical Exam:   Vitals:   Temp:  [36.4 °C (97.6 °F)-37.4 °C (99.3 °F)] 36.4 °C (97.6 °F)  Pulse:  [] 85  Resp:  [16-24] 16  BP: (118-162)/(54-73) 141/72  SpO2:  [89 %-100 %] 96 %   Physical Exam  Constitutional:       General: He is not in acute distress.     Appearance: Normal appearance. He is normal weight. He is not ill-appearing or toxic-appearing.      Comments: Pleasant elderly male   HENT:      Head: Normocephalic and atraumatic.      Nose: Nose normal.      Mouth/Throat:      Mouth: Mucous membranes are moist.      Pharynx: No oropharyngeal exudate.      Comments: Mallampati score class I  Eyes:      Extraocular Movements: Extraocular movements intact.      Conjunctiva/sclera: Conjunctivae normal.      Pupils: Pupils are equal, round, and reactive to light.   Neck:      Musculoskeletal: Normal range of motion and neck supple. No neck rigidity.   Cardiovascular:      Rate and Rhythm: Normal rate and regular rhythm.      Pulses: Normal pulses.      Heart sounds: Normal heart sounds. No murmur. No friction rub. No gallop.    Pulmonary:      Effort: Pulmonary effort is normal. No respiratory distress.      Breath sounds: Normal breath sounds. No wheezing or rales.   Abdominal:      General: Abdomen is flat. Bowel sounds are normal. There is no distension.      Palpations: Abdomen is soft.      Tenderness: There is no abdominal tenderness. There is no guarding.   Genitourinary:     Comments: Refused JOSE  Musculoskeletal: Normal range of motion.         General: Swelling present.      Left lower leg: Edema (edema in left foot uptothe ankle) present.   Skin:     General: Skin is warm.      Coloration: Skin is not jaundiced.      Findings: Erythema (in left foot up to the ankle, more aroud great toe) present.  No bruising or lesion.      Comments: 1 cm round wound on the planter surface of the great toe near tip, with dried black base without any discharge, surrounded by erythema and mild fluctuance on palpation   Neurological:      General: No focal deficit present.      Mental Status: He is alert and oriented to person, place, and time. Mental status is at baseline.      Motor: No weakness.   Psychiatric:         Mood and Affect: Mood normal.         Behavior: Behavior normal.           Imaging:   MRI foot pending.        * Cellulitis of left foot- (present on admission)  Assessment & Plan  -Patient presented with small 1 cm wound in the left foot big toe and progressive erythema as well as swelling in left foot for the past 2 days; does not complain of pain in the foot  -On exam, patient has 1 cm round wound on the plantar surface of big toe near tip, with dried black base without any discharge, surrounded by erythema up to above the ankle  -No systemic signs of infection like fever/chills or leukocytosis  -Vital signs are WNL on admission; elevated CRP 4.51  -X-ray left foot did not show signs of osteomyelitis; positive for gas within the soft tissues of the 1st digit     -Admitted inpatient for further management  -Continue diabetic diet  -Continue IV Unasyn and IV vancomycin  -Tight blood sugar control; continue Lantus 5 units and ISS with hypoglycemia protocol with Accu-Cheks  -Follow blood cultures  -IP consult to LPS    Diabetes mellitus type 2 in nonobese ([facility])  Assessment & Plan  Patient has a history of  IDT2DM with ESRD and peripheral neuropathy. Most recent  HbA1c 3 weeks ago was 5.8    -Continue Lantus 5 units once daily at home  -Continue diabetic diet  -Accu checks , hypoglycemia protocol        Paroxysmal A-fib ([facility])- (present on admission)  Assessment & Plan  Hx of paroxysmal A-fib    -Continue Apixaban     End stage renal disease on dialysis ([facility])- (present on admission)  Assessment & Plan  -Patient  has a history of end-stage renal disease, on hemodialysis (on Tue/Thu/Sat)  -Follows up with Dr. [facility].  -No symptoms of uremia or volume overload.  -Cr 2.81 , BUN 17, K 4 on admission  -Last HD 4/21  -Nephrology consult      Anemia of chronic disease- (present on admission)  Assessment & Plan  Stable   -Hemoglobin 11.7 on admission ; normal MCV normocytic anemia   -Likely secondary to end-stage renal disease         CAD (coronary artery disease)- (present on admission)  Assessment & Plan  History of CAD s/p  stents in proximal and mid LAD 13yrs ago    -Continue clopidogrel, statin, beta-blocker   -Monitor      Dyslipidemia- (present on admission)  Assessment & Plan  Continue Atorvastatin 40 mg daily      Hyperthyroidism- (present on admission)  Assessment & Plan  Most recent TSH 0.005 and FT-4 2.55   4/2020    -Continue Methimazole

## 2020-04-22 NOTE — WOUND TEAM
Spoke with LPS BABAR Lechuga. LPS will take over pt wound care involving L foot, wound team will sign off at this time. If LPS would like wound team to follow they will re consult.

## 2020-04-22 NOTE — SENIOR ADMIT NOTE
Senior Admit Note    Luis Sepulveda is a 74 y.o. 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 Patient reports he had noticed erythema and swelling of his left great toe two days ago. He doesn't remember if he sustained any trauma.     In the ED, patient was hemodynamically stable. He did not have any acute lab abnormalities. Lower extremity arterial ultrasound was obtained which did not show any acute abnormality.     Pertinent physical exam:   General: Sitting in bed in no acute distress   Left lower extremity: Great toe distal tip with fluctuance and tenderness +small 1cm, eschar, erythema and edema extending to mid calf     Assessment and Plan:   # Left diabetic foot wound   - Admit to Orthopedics   - Left foot x-ray obtained which does not show evidence of osteomyelitis   - Vancomycin/Unasyn  - There is some fluctuance at distal toe which is likely a superficial abscess and may need to be drained    - LPS consult in AM       Please see Dr. Pablo's H&P for full details

## 2020-04-22 NOTE — ASSESSMENT & PLAN NOTE
Patient has a history of  IDT2DM with ESRD and peripheral neuropathy. Most recent  HbA1c 3 weeks ago was 5.8    -Continue home Lantus 5 units once daily  -Continue diabetic diet  -Accu checks, hypoglycemia protocol

## 2020-04-22 NOTE — ED NOTES
Med rec updated. Allergies reviewed. Interviewed pt via phone.    No oral antibiotic use in last 14 days.    No medications taken today.    Home pharmacy [facility]

## 2020-04-22 NOTE — ASSESSMENT & PLAN NOTE
Patient admitted after he was sent to ER by his podiatrist for left big toe ulcer with black base and associated erythema and edema extending to involve left foot and mid calf. No systemic symptoms , pain , discharge. Elevated ESR , CRP , normal WBC. Xray negative for OM. Limb preservation and vascular surgery consulted.    -Continue IV Cefazolin after dialysis only per ID recs  -Follow blood and wound cx  -Significant LLE arterial disease  - s/p LLE angiogram with angioplasty 4/24

## 2020-04-22 NOTE — ASSESSMENT & PLAN NOTE
-Patient has a history of hypothyroidism  -On methimazole at home  -Low TSH and high T4 of 2.55  -Increase methimazole to 5 mg 3 times daily  -Follow-up with labs outpatient

## 2020-04-22 NOTE — PROGRESS NOTES
2 RN skin check completed with Felix RN  Devices in place none  Skin assessed under devices n/a  Patient has left lower extremity cellulitis, redness extends from his foot to mid calf. He has a wound to his left great toe that is black in color and non blanching. Wound pictures taken and consult placed per protocol. Bilateral heels are calloused and flaky but intact and blanching. All other bony prominences assessed, all pink intact and blanching.   The following interventions in place patient is able to turn self from side to side and has been encouraged to do so at least every two hours. Waffle overlay applied to mattress.

## 2020-04-22 NOTE — DOCUMENTATION QUERY
[facility]                                                                       Query Response Note      PATIENT:               KLARISSA BRADSHAW  ACCT #:                  [id number]  MRN:                     [id number]  :                      1945  ADMIT DATE:       2020 4:17 PM  DISCH DATE:          RESPONDING  PROVIDER #:        [id number]           QUERY TEXT:    Please clarify in documentation the relationship, if any, between cellulitis and diabetes    The patient's Clinical Indicators include:  - Findings:  Diabetes type 2 and cellulitis with foot ulcer per H&P and progress notes   - Treatments:  antibiotics, x ray   - Risk factors:  cellulitis, DM, end stage renal disease, diabetes with foot ulcer, polyneuropathy  Options provided:   -- Cellulitis is due to or associated with diabetes   -- Cellulitis is not due to or associated with diabetes   -- Unable to determine      Query created by: Mary Kay Lange on 2020 7:12 AM    RESPONSE TEXT:    Cellulitis is due to or associated with diabetes          Electronically signed by:  KRISTINA HAGAN MD 2020 8:39 AM

## 2020-04-23 ENCOUNTER — PATIENT OUTREACH (OUTPATIENT)
Dept: HEALTH INFORMATION MANAGEMENT | Facility: OTHER | Age: 75
End: 2020-04-23

## 2020-04-23 PROBLEM — I73.9 PERIPHERAL VASCULAR DISEASE (HCC): Status: ACTIVE | Noted: 2020-04-23

## 2020-04-23 LAB
ALBUMIN SERPL BCP-MCNC: 3.3 G/DL (ref 3.2–4.9)
ALBUMIN/GLOB SERPL: 1.1 G/DL
ALP SERPL-CCNC: 103 U/L (ref 30–99)
ALT SERPL-CCNC: 10 U/L (ref 2–50)
ANION GAP SERPL CALC-SCNC: 13 MMOL/L (ref 7–16)
AST SERPL-CCNC: 10 U/L (ref 12–45)
BILIRUB SERPL-MCNC: 0.4 MG/DL (ref 0.1–1.5)
BUN SERPL-MCNC: 32 MG/DL (ref 8–22)
CALCIUM SERPL-MCNC: 9 MG/DL (ref 8.5–10.5)
CHLORIDE SERPL-SCNC: 99 MMOL/L (ref 96–112)
CO2 SERPL-SCNC: 22 MMOL/L (ref 20–33)
CREAT SERPL-MCNC: 4.32 MG/DL (ref 0.5–1.4)
ERYTHROCYTE [DISTWIDTH] IN BLOOD BY AUTOMATED COUNT: 48.4 FL (ref 35.9–50)
GLOBULIN SER CALC-MCNC: 2.9 G/DL (ref 1.9–3.5)
GLUCOSE BLD-MCNC: 104 MG/DL (ref 65–99)
GLUCOSE BLD-MCNC: 115 MG/DL (ref 65–99)
GLUCOSE BLD-MCNC: 154 MG/DL (ref 65–99)
GLUCOSE SERPL-MCNC: 130 MG/DL (ref 65–99)
HCT VFR BLD AUTO: 33.6 % (ref 42–52)
HGB BLD-MCNC: 11.2 G/DL (ref 14–18)
MCH RBC QN AUTO: 31.5 PG (ref 27–33)
MCHC RBC AUTO-ENTMCNC: 33.3 G/DL (ref 33.7–35.3)
MCV RBC AUTO: 94.4 FL (ref 81.4–97.8)
PLATELET # BLD AUTO: 169 K/UL (ref 164–446)
PMV BLD AUTO: 9 FL (ref 9–12.9)
POTASSIUM SERPL-SCNC: 4.2 MMOL/L (ref 3.6–5.5)
PROT SERPL-MCNC: 6.2 G/DL (ref 6–8.2)
RBC # BLD AUTO: 3.56 M/UL (ref 4.7–6.1)
SODIUM SERPL-SCNC: 134 MMOL/L (ref 135–145)
T4 FREE SERPL-MCNC: 1.43 NG/DL (ref 0.53–1.43)
WBC # BLD AUTO: 7.8 K/UL (ref 4.8–10.8)

## 2020-04-23 PROCEDURE — 700111 HCHG RX REV CODE 636 W/ 250 OVERRIDE (IP): Performed by: STUDENT IN AN ORGANIZED HEALTH CARE EDUCATION/TRAINING PROGRAM

## 2020-04-23 PROCEDURE — 700102 HCHG RX REV CODE 250 W/ 637 OVERRIDE(OP): Performed by: STUDENT IN AN ORGANIZED HEALTH CARE EDUCATION/TRAINING PROGRAM

## 2020-04-23 PROCEDURE — 99233 SBSQ HOSP IP/OBS HIGH 50: CPT | Mod: GC | Performed by: INTERNAL MEDICINE

## 2020-04-23 PROCEDURE — A9270 NON-COVERED ITEM OR SERVICE: HCPCS | Performed by: STUDENT IN AN ORGANIZED HEALTH CARE EDUCATION/TRAINING PROGRAM

## 2020-04-23 PROCEDURE — 80053 COMPREHEN METABOLIC PANEL: CPT

## 2020-04-23 PROCEDURE — 700111 HCHG RX REV CODE 636 W/ 250 OVERRIDE (IP)

## 2020-04-23 PROCEDURE — 770006 HCHG ROOM/CARE - MED/SURG/GYN SEMI*

## 2020-04-23 PROCEDURE — 84439 ASSAY OF FREE THYROXINE: CPT

## 2020-04-23 PROCEDURE — 90935 HEMODIALYSIS ONE EVALUATION: CPT

## 2020-04-23 PROCEDURE — 85027 COMPLETE CBC AUTOMATED: CPT

## 2020-04-23 PROCEDURE — 5A1D70Z PERFORMANCE OF URINARY FILTRATION, INTERMITTENT, LESS THAN 6 HOURS PER DAY: ICD-10-PCS | Performed by: INTERNAL MEDICINE

## 2020-04-23 PROCEDURE — 82962 GLUCOSE BLOOD TEST: CPT

## 2020-04-23 PROCEDURE — 36415 COLL VENOUS BLD VENIPUNCTURE: CPT

## 2020-04-23 PROCEDURE — 700105 HCHG RX REV CODE 258: Performed by: STUDENT IN AN ORGANIZED HEALTH CARE EDUCATION/TRAINING PROGRAM

## 2020-04-23 RX ORDER — HEPARIN SODIUM 1000 [USP'U]/ML
3700 INJECTION, SOLUTION INTRAVENOUS; SUBCUTANEOUS
Status: DISCONTINUED | OUTPATIENT
Start: 2020-04-23 | End: 2020-04-25 | Stop reason: HOSPADM

## 2020-04-23 RX ORDER — CEFAZOLIN SODIUM 2 G/100ML
2 INJECTION, SOLUTION INTRAVENOUS
Status: COMPLETED | OUTPATIENT
Start: 2020-04-23 | End: 2020-04-25

## 2020-04-23 RX ORDER — HEPARIN SODIUM 1000 [USP'U]/ML
INJECTION, SOLUTION INTRAVENOUS; SUBCUTANEOUS
Status: COMPLETED
Start: 2020-04-23 | End: 2020-04-23

## 2020-04-23 RX ADMIN — CARVEDILOL 3.12 MG: 3.12 TABLET, FILM COATED ORAL at 18:17

## 2020-04-23 RX ADMIN — INSULIN GLARGINE 5 UNITS: 100 INJECTION, SOLUTION SUBCUTANEOUS at 18:18

## 2020-04-23 RX ADMIN — Medication 1334 MG: at 13:19

## 2020-04-23 RX ADMIN — METHIMAZOLE 5 MG: 5 TABLET ORAL at 20:12

## 2020-04-23 RX ADMIN — INSULIN LISPRO 2 UNITS: 100 INJECTION, SOLUTION INTRAVENOUS; SUBCUTANEOUS at 18:18

## 2020-04-23 RX ADMIN — AMPICILLIN SODIUM AND SULBACTAM SODIUM 3 G: 2; 1 INJECTION, POWDER, FOR SOLUTION INTRAMUSCULAR; INTRAVENOUS at 04:52

## 2020-04-23 RX ADMIN — DOXYCYCLINE 100 MG: 100 TABLET, FILM COATED ORAL at 04:53

## 2020-04-23 RX ADMIN — HEPARIN SODIUM 3700 UNITS: 1000 INJECTION, SOLUTION INTRAVENOUS; SUBCUTANEOUS at 11:31

## 2020-04-23 RX ADMIN — CEFAZOLIN SODIUM 2 G: 2 INJECTION, SOLUTION INTRAVENOUS at 16:53

## 2020-04-23 RX ADMIN — METHIMAZOLE 5 MG: 5 TABLET ORAL at 13:19

## 2020-04-23 RX ADMIN — ATORVASTATIN CALCIUM 40 MG: 20 TABLET, FILM COATED ORAL at 20:13

## 2020-04-23 RX ADMIN — Medication 1334 MG: at 08:50

## 2020-04-23 RX ADMIN — OMEPRAZOLE 20 MG: 20 CAPSULE, DELAYED RELEASE ORAL at 20:14

## 2020-04-23 RX ADMIN — Medication 1334 MG: at 18:16

## 2020-04-23 RX ADMIN — METHIMAZOLE 5 MG: 5 TABLET ORAL at 04:52

## 2020-04-23 RX ADMIN — CLOPIDOGREL BISULFATE 75 MG: 75 TABLET ORAL at 04:53

## 2020-04-23 ASSESSMENT — ENCOUNTER SYMPTOMS
HEARTBURN: 0
HALLUCINATIONS: 0
MEMORY LOSS: 0
CHILLS: 0
SENSORY CHANGE: 0
ORTHOPNEA: 0
DOUBLE VISION: 0
EYE REDNESS: 0
FOCAL WEAKNESS: 0
FEVER: 0
POLYDIPSIA: 0
PALPITATIONS: 0
SPEECH CHANGE: 0
SORE THROAT: 0
BRUISES/BLEEDS EASILY: 0
HEMOPTYSIS: 0
BLOOD IN STOOL: 0
SHORTNESS OF BREATH: 0
PHOTOPHOBIA: 0
SPUTUM PRODUCTION: 0
MYALGIAS: 0
DEPRESSION: 0
BLURRED VISION: 0
EYE PAIN: 0
COUGH: 0
HEADACHES: 0
ABDOMINAL PAIN: 0
SEIZURES: 0
DIZZINESS: 0
STRIDOR: 0
VOMITING: 0
NAUSEA: 0

## 2020-04-23 NOTE — DISCHARGE PLANNING
Care Transition Team Assessment    Information Source  Orientation : Oriented x 4  Information Given By: chart review, patient unavailable for assessment.   Informant's Name: (chart review)  Who is responsible for making decisions for patient? : (patient)    Readmission Evaluation  Is this a readmission?: (No)    Elopement Risk  Legal Hold: No  Ambulatory or Self Mobile in Wheelchair: Yes  Elopement Risk: Not at Risk for Elopement    Interdisciplinary Discharge Planning  Patient or legal guardian wants to designate a caregiver (see row info): No    Discharge Preparedness  What is your plan after discharge?: (home, prior living situation. Ex-wife for support)  What are your discharge supports?: (Ex-wife)  Difficulity with ADLs: (Unknown)  Difficulity with IADLs: (Unknown)         Finances  Financial Barriers to Discharge: No  Prescription Coverage: Yes    Vision / Hearing Impairment  Vision Impairment : No  Hearing Impairment : No         Advance Directive  Advance Directive?: (No)    Domestic Abuse  Have you ever been the victim of abuse or violence?: No  Physical Abuse or Sexual Abuse: No  Verbal Abuse or Emotional Abuse: No  Possible Abuse Reported to:: Not Applicable    Psychological Assessment  History of Substance Abuse: (No)  History of Psychiatric Problems: (No)  Non-compliant with Treatment: (Unknown)    Discharge Risks or Barriers  Discharge risks or barriers?: Complex medical needs  Patient risk factors: Vulnerable adult

## 2020-04-23 NOTE — CONSULTS
Date of Service  4/23/2020    Reason For Consult  Left first toe pain    Requesting Physician  LAMAR Delvalle    Consulting Physician  Rima Howell M.D.    Primary Care Physician  Patito Edgar M.D.    Chief Complaint  Left first toe pain and discoloration    History of Present Illness  74 y.o. male who presented 4/21/2020 with toe pain.  He relates seein g black spot on the tip of his toe several days ago.  He tried to clean to area with alcohol and the following day, noted the toe was spongy and tender.  He sought the care of Dr. Alex DPM, who sent him to the hospital.  He was started on IV antibiotic and non-invasive studies show tibial artery disease and possible occlusion.  He has diabetes and is on maintanence dialysis for ESRD via left arm fistula.    Review of Systems  Review of Systems   Constitutional: Negative for chills, fever and malaise/fatigue.   Cardiovascular: Negative for chest pain and palpitations.   Musculoskeletal:        Left great toe pain       Past Medical History  Past Medical History:   Diagnosis Date   • CAD (coronary artery disease)     stents   • Chronic kidney disease (CKD), stage V (HCC)    • Diabetes    • Hypertension    • Osteoarthritis    • Peripheral neuropathy        Surgical History  Past Surgical History:   Procedure Laterality Date   • APPENDECTOMY     • OTHER CARDIAC SURGERY      stents x1   • OTHER ORTHOPEDIC SURGERY      knee surgery        Family History  Family History   Problem Relation Age of Onset   • Heart Disease Mother    • Alcohol/Drug Father    • Thyroid Son    • Diabetes Brother    • Hypertension Neg Hx    • Hyperlipidemia Neg Hx         Social History  Social History     Socioeconomic History   • Marital status:      Spouse name: Not on file   • Number of children: Not on file   • Years of education: Not on file   • Highest education level: Not on file   Occupational History   • Not on file   Social Needs   • Financial  resource strain: Not on file   • Food insecurity     Worry: Not on file     Inability: Not on file   • Transportation needs     Medical: Not on file     Non-medical: Not on file   Tobacco Use   • Smoking status: Former Smoker     Packs/day: 1.00     Years: 55.00     Pack years: 55.00     Types: Cigarettes     Last attempt to quit: 2014     Years since quittin.2   • Smokeless tobacco: Never Used   Substance and Sexual Activity   • Alcohol use: No   • Drug use: No   • Sexual activity: Not on file   Lifestyle   • Physical activity     Days per week: Not on file     Minutes per session: Not on file   • Stress: Not on file   Relationships   • Social connections     Talks on phone: Not on file     Gets together: Not on file     Attends Oriental orthodox service: Not on file     Active member of club or organization: Not on file     Attends meetings of clubs or organizations: Not on file     Relationship status: Not on file   • Intimate partner violence     Fear of current or ex partner: Not on file     Emotionally abused: Not on file     Physically abused: Not on file     Forced sexual activity: Not on file   Other Topics Concern   • Not on file   Social History Narrative   • Not on file       Medications  Prior to Admission Medications   Prescriptions Last Dose Informant Patient Reported? Taking?   apixaban (ELIQUIS) 2.5mg Tab 2020 at 2100 Patient Yes No   Sig: Take 2.5 mg by mouth 2 Times a Day.   atorvastatin (LIPITOR) 40 MG Tab 2020 at 2100 Patient No No   Sig: Take 1 Tab by mouth every bedtime.   calcitRIOL (ROCALTROL) 0.25 MCG Cap 2020 at 0900 Patient Yes No   Sig: Take 0.25 mcg by mouth every day.   calcium acetate (PHOS-LO) 667 MG Cap 2020 at 1800 Patient Yes No   Sig: Take 1,334 mg by mouth 3 times a day, with meals.   carvedilol (COREG) 3.125 MG Tab 2020 at 2030 Patient Yes No   Sig: Take 3.125 mg by mouth 2 times a day, with meals.   clopidogrel (PLAVIX) 75 MG Tab 2020 at 0900  Patient No No   Sig: Take 1 Tab by mouth every day.   insulin glargine (LANTUS SOLOSTAR) 100 UNIT/ML Solution Pen-injector injection 4/20/2020 at 2100 Patient Yes No   Sig: Inject 5 Units as instructed every bedtime.   methimazole (TAPAZOLE) 5 MG Tab 4/20/2020 at 2100 Patient No No   Sig: Take 1.5 tabs in am, and 1 tab in evening   pantoprazole (PROTONIX) 40 MG Tablet Delayed Response 4/20/2020 at 2100 Patient Yes No   Sig: Take 40 mg by mouth every bedtime.      Facility-Administered Medications: None       Current Facility-Administered Medications   Medication Dose Route Frequency Provider Last Rate Last Dose   • heparin injection 3,700 Units  3,700 Units Intracatheter DIALYSIS PRN Marvin Mahajan M.D.   3,700 Units at 04/23/20 1131   • methimazole (TAPAZOLE) tablet 5 mg  5 mg Oral TID Sheree Cavazos D.OTrey   5 mg at 04/23/20 0452   • doxycycline monohydrate (ADOXA) tablet 100 mg  100 mg Oral Q12HRS Becca Callejas M.D.   100 mg at 04/23/20 0453   • lidocaine (XYLOCAINE) 2 % injection 20 mL  20 mL Other Once PRN Carla Farah, A.P.R.N.       • hydrALAZINE (APRESOLINE) injection 10 mg  10 mg Intravenous Q6HRS PRN Chris Ortiz M.D.       • calcium acetate (PHOS-LO) 667 MG tablet 1,334 mg  1,334 mg Oral TID WITH MEALS Chris Ortiz M.D.   1,334 mg at 04/23/20 0850   • senna-docusate (PERICOLACE or SENOKOT S) 8.6-50 MG per tablet 2 Tab  2 Tab Oral BID Nolan Pablo M.D.        And   • polyethylene glycol/lytes (MIRALAX) PACKET 1 Packet  1 Packet Oral QDAY PRN Nolan Pablo M.D.        And   • magnesium hydroxide (MILK OF MAGNESIA) suspension 30 mL  30 mL Oral QDAY PRN Nolan Pablo M.D.        And   • bisacodyl (DULCOLAX) suppository 10 mg  10 mg Rectal QDAY PRN Nolan Pablo M.D.       • acetaminophen (TYLENOL) tablet 650 mg  650 mg Oral Q6HRS PRN Nolan Pablo M.D.       • ampicillin/sulbactam (UNASYN) 3 g in  mL IVPB  3 g Intravenous Q12HRS Nolan Pablo M.D.   Stopped at 04/23/20 0522   •  atorvastatin (LIPITOR) tablet 40 mg  40 mg Oral QHS Nolan Pablo M.D.   40 mg at 04/22/20 2035   • carvedilol (COREG) tablet 3.125 mg  3.125 mg Oral BID WITH MEALS Nolan Pablo M.D.   3.125 mg at 04/22/20 1742   • clopidogrel (PLAVIX) tablet 75 mg  75 mg Oral DAILY Nolan Pablo M.D.   75 mg at 04/23/20 0453   • omeprazole (PRILOSEC) capsule 20 mg  20 mg Oral QHS Nolan Pablo M.D.   20 mg at 04/22/20 2035   • insulin glargine (LANTUS) injection 5 Units  5 Units Subcutaneous Q EVENING Irtkendra Cavazos D.O.   5 Units at 04/22/20 1747    And   • insulin lispro (HUMALOG) injection 2-9 Units  2-9 Units Subcutaneous TID AC Irtkendra Cavazos D.O.   Stopped at 04/22/20 1730    And   • glucose 4 g chewable tablet 16 g  16 g Oral Q15 MIN PRN Irtqac Cavazos D.O.        And   • dextrose 50% (D50W) injection 50 mL  50 mL Intravenous Q15 MIN PRN Hakeemtkendra Cavazos D.O.           Allergies  No Known Allergies      Physical Exam  Temp:  [36.3 °C (97.4 °F)-37.2 °C (99 °F)] 36.4 °C (97.5 °F)  Pulse:  [78-88] 88  Resp:  [16-18] 16  BP: (152-171)/(63-66) 152/66  SpO2:  [96 %-99 %] 99 %    Pulse/Extremity Exam:    Femorals:        Right: palpable       Left palpable  Popliteals:       Right palpable       Left palpable  Pedal Pulses:      All by doppler only    Wounds: ischemic tip of left great toe    General appearance: NAD, conversing appropriately  Psych: Normal affect, mood, judgement  Neuro: CN II-XII grossly intact.   Neck: full range of motion  Lungs: No inspiratory stridor or wheezing  CV: RRR; left arm fistula  Abdomen: Soft, NT/ND  Skin: Left great toe with obvious ischemia and by patient report, less swelling    Labs Reviewed Today:  Recent Labs     04/21/20 1720 04/22/20  0037 04/23/20  0005   WBC 8.3 8.6 7.8   RBC 3.64* 3.40* 3.56*   HEMOGLOBIN 11.7* 10.9* 11.2*   HEMATOCRIT 34.3* 31.8* 33.6*   MCV 94.2 93.5 94.4   MCH 32.1 32.1 31.5   MCHC 34.1 34.3 33.3*   RDW 49.7 49.4 48.4   PLATELETCT 150* 144* 169   MPV 9.3 9.4 9.0     Recent  Labs     04/21/20  1720 04/22/20  0037 04/23/20  0005   SODIUM 137 138 134*   POTASSIUM 4.0 4.1 4.2   CHLORIDE 96 98 99   CO2 28 27 22   GLUCOSE 252* 268* 130*   BUN 17 21 32*   CREATININE 2.81* 3.19* 4.32*   CALCIUM 9.1 8.8 9.0     Recent Labs     04/21/20  1720 04/22/20  0037 04/23/20  0005   ALTSGPT 13  --  10   ASTSGOT 14  --  10*   ALKPHOSPHAT 141*  --  103*   TBILIRUBIN 0.5  --  0.4   GLUCOSE 252* 268* 130*       Imaging Reviewed Today:  I personally reviewed all non-invasive vascular testing including images, x-rays, tracings, arterial waveforms, and duplex exams relevant to this admission. My interpretation is below:    Arterial Duplex: Arterial Duplex Report      Vascular Laboratory   CONCLUSIONS   There is calcific plaque seen throughout the right lower extremity.    Plaque is especially dense in the right tibial and peroneal arteries.   Unable to visualize the right peroneal artery due to calcific shadowing    from the posterior and anterior tibial arteries.    No focal stenosis is seen in right lower extremity arterial system although    there is suspicion for small vessel disease.      The left posterior tibial artery appears to be occluded throughout.    There are multiple tandem stenosis seen in the left proximal anterior    tibial artery. All stenotic velocities are consistent with > 75%stenosis.    Stenosis of the left distal anterior tibial artery seen. Velocities are    consistent with > 75%stenosis.      Assessment/Plan & Medical Decision-Making    I think Mr. Sepulveda would benefit from angiography and an attempt to improve perfusion through his tibial arteries.  I suspect this first toe problem is ischemic in origin especially with the duplex report.  A look at arterial perfusion before any intervention is the best plan and I will try to make that happen.   Plan for left lower extremity angiogram with possible intervention    Rima Howell MD  Vascular Surgeon  [facility]  Office:  [phone]

## 2020-04-23 NOTE — CARE PLAN
Problem: Safety  Goal: Will remain free from injury  Outcome: PROGRESSING AS EXPECTED  Safety precautions are in place including bed locked and in lowest position, upper bed rails up, call light within reach, treaded socks on, tray table and personal belongings within reach.      Problem: Bowel/Gastric:  Goal: Normal bowel function is maintained or improved  Outcome: PROGRESSING AS EXPECTED  Patient understands importance of bowel motility. Pt is receiving adequate oral intake. Pt is ambulating. Pt is aware of pharmacological stool softeners and laxatives available as needed.

## 2020-04-23 NOTE — PROGRESS NOTES
Hemodialysis done today, started @ 0912 and ended @ 1243 with net UF= 1500ml. Report given to JAN Hurst. SAee flow sheet for details.

## 2020-04-23 NOTE — PROGRESS NOTES
Daily Progress Note:     Date of Service: 4/23/2020  Primary Team: [facility] Blue Team   Attending: Valdez White M.D.   Senior Resident: Dr. Callejas  Intern: Dr. Ortiz  Contact:  [phone]    Chief Complaint:   Left big toe ulcer      HPI:  73 YO male with a pmh of ESRD (on hemodialysis), CAD status post stents in proximal and mid LAD, IDDM, atrial fibrillation on apixaban, hyperthyroidism and GERD. Patient sent to the hospital by his podiatrist after noticing left big toe ulcer with associated erythema and swelling of his left foot and calf. Noticed first 3 days back after he came out of the shower, worsened over time. Patient denied any fever, chills, pain and discharge. Patient was hemodynamically stable in the ER. CBC and CMP w/o any significant findings. ESR and CRP were elevated. Xray foot was negative for OM, showed some gas consistent w/ open  Ulcer. Patient was admitted for further investigation of the ulcer and IV antibiotic management.           Subjective  -No acute overnight events reported. Pt in NAD.   -Saw the patient after HD , UF 1500,  tolerated well. No complaints.  -Arterial US with significant arterial stenosis. Patient aware of the plan for Angiogram by vascular surgery tomorrow am.   -NPO at midnight , holding Apixaban , morning short acting insulin.      Consultants/Specialty:  Limb preservation  Vascular Surgery- Dr. Howell  Nephrology        Review of Systems:    Review of Systems   Constitutional: Negative for chills, fever and malaise/fatigue.   HENT: Negative for congestion and ear pain.    Eyes: Negative for blurred vision, double vision and photophobia.   Respiratory: Negative for cough, sputum production, shortness of breath and stridor.    Cardiovascular: Negative for chest pain, palpitations, orthopnea and leg swelling.   Gastrointestinal: Negative for abdominal pain, blood in stool, heartburn, nausea and vomiting.   Genitourinary: Negative for dysuria and hematuria.    Musculoskeletal: Negative for joint pain and myalgias.   Skin: Negative for rash.   Neurological: Negative for dizziness, sensory change, speech change, focal weakness, seizures and headaches.   Psychiatric/Behavioral: Negative for depression and memory loss.       Objective Data:   Physical Exam:   Vitals:   Temp:  [36.3 °C (97.4 °F)-37.2 °C (99 °F)] 36.4 °C (97.5 °F)  Pulse:  [78-88] 88  Resp:  [16-18] 16  BP: (152-171)/(63-66) 152/66  SpO2:  [96 %-99 %] 99 %       Physical Exam  Constitutional:       General: He is not in acute distress.     Appearance: Normal appearance. He is normal weight. He is not ill-appearing or toxic-appearing.      Comments: Pleasant elderly male   HENT:      Head: Normocephalic and atraumatic.      Nose: Nose normal.      Mouth/Throat:      Mouth: Mucous membranes are moist.      Pharynx: No oropharyngeal exudate.      Comments: Mallampati score class I  Eyes:      Extraocular Movements: Extraocular movements intact.      Conjunctiva/sclera: Conjunctivae normal.      Pupils: Pupils are equal, round, and reactive to light.   Neck:      Musculoskeletal: Normal range of motion and neck supple. No neck rigidity.   Cardiovascular:      Rate and Rhythm: Normal rate and regular rhythm.      Pulses: Normal pulses.      Heart sounds: Normal heart sounds. No murmur. No friction rub. No gallop.    Pulmonary:      Effort: Pulmonary effort is normal. No respiratory distress.      Breath sounds: Normal breath sounds. No wheezing or rales.   Abdominal:      General: Abdomen is flat. Bowel sounds are normal. There is no distension.      Palpations: Abdomen is soft.      Tenderness: There is no abdominal tenderness. There is no guarding.   Genitourinary:     Comments: Refused JOSE  Musculoskeletal: Normal range of motion.         General: Swelling present.      Left lower leg: Edema (edema in left foot uptothe ankle (improved)) present.   Skin:     General: Skin is warm.      Coloration: Skin is not  jaundiced.      Findings: Erythema (in left foot up to the ankle, more aroud great toe) present. No bruising or lesion.      Comments: 0.5-1 cm round wound o the tip of big toe with dried black base without any discharge, surrounded by erythema extending to involve rest of the foot   Neurological:      General: No focal deficit present.      Mental Status: He is alert and oriented to person, place, and time. Mental status is at baseline.      Motor: No weakness.   Psychiatric:         Mood and Affect: Mood normal.         Behavior: Behavior normal.                   * Peripheral vascular disease (HCC)  Assessment & Plan  Arterial US consistent with significant left posterior and anterior tibial artery occlusion.     -Vascular Surgery on board.  -Plan to perform angiogram on 4/24    Ulcer of left foot- (present on admission)  Assessment & Plan  Patient admitted after he was sent to ER by his podiatrist for left big toe ulcer with black base and associated erythema and edema extending to involve left foot and mid calf. No systemic symptoms , pain , discharge. Elevated ESR , CRP , normal WBC. Xray negative for OM. Limb preservation and vascular surgery were consulted.    -Continue IV Unasyn and PO Doxycycline  -Follow blood and wound Cx  -LPS don't think MRI foot needed at the moment.  -Significant left sided arterial occlusion on Arterial U/S  -Vascular surgery to perform angiogram 4/24    Diabetes mellitus type 2 in nonobese ([facility])  Assessment & Plan  Patient has a history of  IDT2DM with ESRD and peripheral neuropathy. Most recent  HbA1c 3 weeks ago was 5.8    -Continue Lantus 5 units once daily at home  -Continue diabetic diet  -Accu checks , hypoglycemia protocol        Paroxysmal A-fib ([facility])- (present on admission)  Assessment & Plan  Hx of paroxysmal A-fib    -Holding for Angiogram 4/24      End stage renal disease on dialysis ([facility])- (present on admission)  Assessment & Plan  -Patient has a history of end-stage  renal disease, on hemodialysis (on Tue/Thu/Sat)  -Follows up with Dr. [facility].  -No symptoms of uremia or volume overload.  -Cr 2.81 , BUN 17, K 4 on admission  -Last HD 4/21  -Nephrology consult      Anemia of chronic disease- (present on admission)  Assessment & Plan  Stable   -Hemoglobin 11.7 on admission ; normal MCV normocytic anemia   -Likely secondary to end-stage renal disease         CAD (coronary artery disease)- (present on admission)  Assessment & Plan  History of CAD s/p  stents in proximal and mid LAD 13yrs ago    -Continue clopidogrel, statin, beta-blocker   -Monitor      Dyslipidemia- (present on admission)  Assessment & Plan  Continue Atorvastatin 40 mg daily      Hyperthyroidism- (present on admission)  Assessment & Plan  Most recent TSH 0.005 and FT-4 2.55   4/2020    -Continue Methimazole

## 2020-04-23 NOTE — ASSESSMENT & PLAN NOTE
Arterial US consistent with significant left posterior and anterior tibial artery occlusion.     -Vascular Surgery on board.  - s/p LLE angiogram with angioplasty / thrombectomy 4/24

## 2020-04-23 NOTE — DISCHARGE PLANNING
Anticipated Discharge Disposition: Home to prior living situation, patient has support from his ex-wife.     Action: Pending outcome of testing, CM following for any discharge needs    Barriers to Discharge: Diagnostic testing, patient scheduled for angiogram on Friday, 4/23/2020    Plan: following for any discharge needs.     Radha MUSE, RN, [facility]  [email or web address]

## 2020-04-23 NOTE — CARE PLAN
Problem: Safety  Goal: Will remain free from falls  Outcome: PROGRESSING AS EXPECTED   Patient ambulates independently with steady gait.    Problem: Pain Management  Goal: Pain level will decrease to patient's comfort goal  Outcome: PROGRESSING AS EXPECTED   Pain well controlled with oral medications.    Problem: Mobility  Goal: Risk for activity intolerance will decrease  Outcome: PROGRESSING AS EXPECTED   Patient ambulating frequently independently.

## 2020-04-23 NOTE — PROGRESS NOTES
[facility] -  PROGRESS NOTE               Author: Marvin Mahajan M.D. Date & Time: 4/23/2020  11:25 AM     HPI:  73 yo M PMH ESRD TTS iHD, HTN, type 2 DM, anemia of CKD, and CKD-MBD here for CC as above.  He was sent to ED by his PCP after a visit with them where they noted redness/swelling over his left great toe.  He had pain at the site and around it as well.  He initially noted a small wound over the plantar surface of the toe about two days ago; he denies any trauma to that area.  The size of the wound has not enlarged and no bloody discharge noted either.  The pain and swelling worsened and started tracking up the leg so he went to his PCP yesterday and started down this cascade.  He has not tried any medications for it.  No aggravating or alleviating factors.  No other hx of foot ulcers in past.  He otherwise denies F/C/N/V/CP/SOB.  No melena, hematochezia, hematemesis.  No HA, visual changes, or abdominal pain.  In the ED imaging was done and foot XR did not show any OM signs, but did show possibly some gas within soft tissues and so he was admitted to inpatient service.     DAILY NEPHROLOGY SUMMARY:  4/22 - Consult done  4/23 - NAEO, SOD, no complaints    REVIEW OF SYSTEMS:    Review of Systems   Constitutional: Negative for fever and malaise/fatigue.   HENT: Negative for ear pain and sore throat.    Eyes: Negative for pain and redness.   Respiratory: Negative for cough and hemoptysis.    Cardiovascular: Positive for leg swelling. Negative for chest pain.   Gastrointestinal: Negative for abdominal pain and nausea.   Musculoskeletal: Positive for joint pain. Negative for myalgias.   Skin: Negative for itching and rash.   Neurological: Negative for dizziness and headaches.   Endo/Heme/Allergies: Negative for polydipsia. Does not bruise/bleed easily.   Psychiatric/Behavioral: Negative for depression and hallucinations.   All other systems reviewed and are negative.      PAST FAMILY  "HISTORY: Reviewed and unchanged since admission  PAST SURGICAL HISTORY:  Reviewed and unchanged since admission  SOCIAL HISTORY:  Reviewed and unchanged since admission  FAMILY HISTORY: Reviewed and unchanged since admission  CURRENT MEDICATIONS: Reviewed since admission to present  IMAGING STUDIES: Reviewed since admission to present  LABORATORY STUDIES: Reviewed since admission to present    PHYSICAL EXAM:  VS:  /66   Pulse 88   Temp 36.4 °C (97.5 °F) (Temporal)   Resp 16   Ht 1.651 m (5' 5\")   Wt 75.2 kg (165 lb 12.6 oz)   SpO2 99%   BMI 27.59 kg/m²   Physical Exam  Vitals signs and nursing note reviewed.   Constitutional:       General: He is not in acute distress.     Appearance: Normal appearance.   HENT:      Head: Normocephalic and atraumatic.      Right Ear: External ear normal.      Left Ear: External ear normal.      Nose: Nose normal. No congestion.      Mouth/Throat:      Mouth: Mucous membranes are moist.      Pharynx: No oropharyngeal exudate.   Eyes:      General:         Right eye: No discharge.         Left eye: No discharge.      Extraocular Movements: Extraocular movements intact.   Neck:      Musculoskeletal: Normal range of motion and neck supple. No muscular tenderness.   Cardiovascular:      Rate and Rhythm: Normal rate and regular rhythm.      Heart sounds: Normal heart sounds. No murmur.   Pulmonary:      Effort: Pulmonary effort is normal.      Breath sounds: Normal breath sounds.   Abdominal:      General: Bowel sounds are normal.      Palpations: Abdomen is soft.   Musculoskeletal:         General: Swelling (Left foot) and tenderness (Left foot) present.   Skin:     General: Skin is warm and dry.      Findings: Erythema present. No lesion.   Neurological:      Mental Status: He is alert and oriented to person, place, and time. Mental status is at baseline.      Motor: No weakness.   Psychiatric:         Mood and Affect: Mood normal.         Behavior: Behavior normal. "     Fluids:  In: 240 [P.O.:240]  Out: 0     LABS:  Recent Results (from the past 24 hour(s))   ACCU-CHEK GLUCOSE    Collection Time: 04/22/20  1:04 PM   Result Value Ref Range    Glucose - Accu-Ck 229 (H) 65 - 99 mg/dL   ACCU-CHEK GLUCOSE    Collection Time: 04/22/20  5:32 PM   Result Value Ref Range    Glucose - Accu-Ck 140 (H) 65 - 99 mg/dL   ACCU-CHEK GLUCOSE    Collection Time: 04/22/20  8:33 PM   Result Value Ref Range    Glucose - Accu-Ck 159 (H) 65 - 99 mg/dL   Comp Metabolic Panel    Collection Time: 04/23/20 12:05 AM   Result Value Ref Range    Sodium 134 (L) 135 - 145 mmol/L    Potassium 4.2 3.6 - 5.5 mmol/L    Chloride 99 96 - 112 mmol/L    Co2 22 20 - 33 mmol/L    Anion Gap 13.0 7.0 - 16.0    Glucose 130 (H) 65 - 99 mg/dL    Bun 32 (H) 8 - 22 mg/dL    Creatinine 4.32 (H) 0.50 - 1.40 mg/dL    Calcium 9.0 8.5 - 10.5 mg/dL    AST(SGOT) 10 (L) 12 - 45 U/L    ALT(SGPT) 10 2 - 50 U/L    Alkaline Phosphatase 103 (H) 30 - 99 U/L    Total Bilirubin 0.4 0.1 - 1.5 mg/dL    Albumin 3.3 3.2 - 4.9 g/dL    Total Protein 6.2 6.0 - 8.2 g/dL    Globulin 2.9 1.9 - 3.5 g/dL    A-G Ratio 1.1 g/dL   CBC WITHOUT DIFFERENTIAL    Collection Time: 04/23/20 12:05 AM   Result Value Ref Range    WBC 7.8 4.8 - 10.8 K/uL    RBC 3.56 (L) 4.70 - 6.10 M/uL    Hemoglobin 11.2 (L) 14.0 - 18.0 g/dL    Hematocrit 33.6 (L) 42.0 - 52.0 %    MCV 94.4 81.4 - 97.8 fL    MCH 31.5 27.0 - 33.0 pg    MCHC 33.3 (L) 33.7 - 35.3 g/dL    RDW 48.4 35.9 - 50.0 fL    Platelet Count 169 164 - 446 K/uL    MPV 9.0 9.0 - 12.9 fL   ESTIMATED GFR    Collection Time: 04/23/20 12:05 AM   Result Value Ref Range    GFR If  16 (A) >60 mL/min/1.73 m 2    GFR If Non  13 (A) >60 mL/min/1.73 m 2   ACCU-CHEK GLUCOSE    Collection Time: 04/23/20  8:17 AM   Result Value Ref Range    Glucose - Accu-Ck 104 (H) 65 - 99 mg/dL       (click the triangle to expand results)    IMPRESSION:  - ESRD    * Etiology likely 2/2 HTN/DM    * TTS iHD @ [place]  SR  - Left foot open wound    * Likely diabetic related    * Angiogram planned to evaluate blood flow  - HTN    * Goal BP < 140/90    * Stable  - Anemia of CKD    * Goal Hgb 10-11    * Stable  - CKD-MBD    * Managed at HD unit  - HLD    * On statin  - CAD    * On statin     PLAN:  - TTS iHD  - UF as tolerated  - LE angiogram tomorrow, will decide further plan on his foot wound  - Continue home PO4 binders  - No dietary protein restrictions  - Abx per primary team, dosed appropriately for ESRD   - Dose all meds per ESRD    All prior notes form other doctors and RN staff were reviewed from admission to current day to help me make my clinical decisions

## 2020-04-24 ENCOUNTER — APPOINTMENT (OUTPATIENT)
Dept: RADIOLOGY | Facility: MEDICAL CENTER | Age: 75
DRG: 270 | End: 2020-04-24
Attending: SURGERY
Payer: MEDICARE

## 2020-04-24 LAB
ALBUMIN SERPL BCP-MCNC: 3.4 G/DL (ref 3.2–4.9)
ALBUMIN/GLOB SERPL: 1.2 G/DL
ALP SERPL-CCNC: 108 U/L (ref 30–99)
ALT SERPL-CCNC: 9 U/L (ref 2–50)
ANION GAP SERPL CALC-SCNC: 12 MMOL/L (ref 7–16)
APTT PPP: 31.4 SEC (ref 24.7–36)
AST SERPL-CCNC: 15 U/L (ref 12–45)
BASOPHILS # BLD AUTO: 0.3 % (ref 0–1.8)
BASOPHILS # BLD: 0.02 K/UL (ref 0–0.12)
BILIRUB SERPL-MCNC: 0.4 MG/DL (ref 0.1–1.5)
BUN SERPL-MCNC: 23 MG/DL (ref 8–22)
CALCIUM SERPL-MCNC: 9.1 MG/DL (ref 8.5–10.5)
CHLORIDE SERPL-SCNC: 94 MMOL/L (ref 96–112)
CO2 SERPL-SCNC: 28 MMOL/L (ref 20–33)
CREAT SERPL-MCNC: 3.22 MG/DL (ref 0.5–1.4)
EOSINOPHIL # BLD AUTO: 0.03 K/UL (ref 0–0.51)
EOSINOPHIL NFR BLD: 0.4 % (ref 0–6.9)
ERYTHROCYTE [DISTWIDTH] IN BLOOD BY AUTOMATED COUNT: 47.5 FL (ref 35.9–50)
GLOBULIN SER CALC-MCNC: 2.9 G/DL (ref 1.9–3.5)
GLUCOSE BLD-MCNC: 124 MG/DL (ref 65–99)
GLUCOSE BLD-MCNC: 158 MG/DL (ref 65–99)
GLUCOSE BLD-MCNC: 161 MG/DL (ref 65–99)
GLUCOSE BLD-MCNC: 89 MG/DL (ref 65–99)
GLUCOSE SERPL-MCNC: 172 MG/DL (ref 65–99)
HCT VFR BLD AUTO: 32.8 % (ref 42–52)
HGB BLD-MCNC: 11.5 G/DL (ref 14–18)
IMM GRANULOCYTES # BLD AUTO: 0.06 K/UL (ref 0–0.11)
IMM GRANULOCYTES NFR BLD AUTO: 0.8 % (ref 0–0.9)
INR PPP: 1.21 (ref 0.87–1.13)
LYMPHOCYTES # BLD AUTO: 1.04 K/UL (ref 1–4.8)
LYMPHOCYTES NFR BLD: 14.1 % (ref 22–41)
MCH RBC QN AUTO: 32.2 PG (ref 27–33)
MCHC RBC AUTO-ENTMCNC: 35.1 G/DL (ref 33.7–35.3)
MCV RBC AUTO: 91.9 FL (ref 81.4–97.8)
MONOCYTES # BLD AUTO: 0.79 K/UL (ref 0–0.85)
MONOCYTES NFR BLD AUTO: 10.7 % (ref 0–13.4)
NEUTROPHILS # BLD AUTO: 5.41 K/UL (ref 1.82–7.42)
NEUTROPHILS NFR BLD: 73.7 % (ref 44–72)
NRBC # BLD AUTO: 0 K/UL
NRBC BLD-RTO: 0 /100 WBC
PLATELET # BLD AUTO: 180 K/UL (ref 164–446)
PMV BLD AUTO: 8.8 FL (ref 9–12.9)
POTASSIUM SERPL-SCNC: 4.1 MMOL/L (ref 3.6–5.5)
PROT SERPL-MCNC: 6.3 G/DL (ref 6–8.2)
PROTHROMBIN TIME: 15.6 SEC (ref 12–14.6)
RBC # BLD AUTO: 3.57 M/UL (ref 4.7–6.1)
SODIUM SERPL-SCNC: 134 MMOL/L (ref 135–145)
WBC # BLD AUTO: 7.4 K/UL (ref 4.8–10.8)

## 2020-04-24 PROCEDURE — 770006 HCHG ROOM/CARE - MED/SURG/GYN SEMI*

## 2020-04-24 PROCEDURE — 700111 HCHG RX REV CODE 636 W/ 250 OVERRIDE (IP)

## 2020-04-24 PROCEDURE — 82962 GLUCOSE BLOOD TEST: CPT

## 2020-04-24 PROCEDURE — B4101ZZ FLUOROSCOPY OF ABDOMINAL AORTA USING LOW OSMOLAR CONTRAST: ICD-10-PCS | Performed by: SURGERY

## 2020-04-24 PROCEDURE — 85610 PROTHROMBIN TIME: CPT

## 2020-04-24 PROCEDURE — A9270 NON-COVERED ITEM OR SERVICE: HCPCS | Performed by: STUDENT IN AN ORGANIZED HEALTH CARE EDUCATION/TRAINING PROGRAM

## 2020-04-24 PROCEDURE — 86341 ISLET CELL ANTIBODY: CPT

## 2020-04-24 PROCEDURE — 37184 PRIM ART M-THRMBC 1ST VSL: CPT | Mod: RT

## 2020-04-24 PROCEDURE — 80053 COMPREHEN METABOLIC PANEL: CPT

## 2020-04-24 PROCEDURE — 700102 HCHG RX REV CODE 250 W/ 637 OVERRIDE(OP): Performed by: STUDENT IN AN ORGANIZED HEALTH CARE EDUCATION/TRAINING PROGRAM

## 2020-04-24 PROCEDURE — 700117 HCHG RX CONTRAST REV CODE 255: Performed by: SURGERY

## 2020-04-24 PROCEDURE — B41D1ZZ FLUOROSCOPY OF AORTA AND BILATERAL LOWER EXTREMITY ARTERIES USING LOW OSMOLAR CONTRAST: ICD-10-PCS | Performed by: SURGERY

## 2020-04-24 PROCEDURE — 85730 THROMBOPLASTIN TIME PARTIAL: CPT

## 2020-04-24 PROCEDURE — 04CU3ZZ EXTIRPATION OF MATTER FROM LEFT PERONEAL ARTERY, PERCUTANEOUS APPROACH: ICD-10-PCS | Performed by: SURGERY

## 2020-04-24 PROCEDURE — 85025 COMPLETE CBC W/AUTO DIFF WBC: CPT

## 2020-04-24 PROCEDURE — 700102 HCHG RX REV CODE 250 W/ 637 OVERRIDE(OP): Performed by: SURGERY

## 2020-04-24 PROCEDURE — 99232 SBSQ HOSP IP/OBS MODERATE 35: CPT | Mod: GC | Performed by: INTERNAL MEDICINE

## 2020-04-24 PROCEDURE — 047U3ZZ DILATION OF LEFT PERONEAL ARTERY, PERCUTANEOUS APPROACH: ICD-10-PCS | Performed by: SURGERY

## 2020-04-24 PROCEDURE — 700111 HCHG RX REV CODE 636 W/ 250 OVERRIDE (IP): Performed by: SURGERY

## 2020-04-24 PROCEDURE — 047Q3ZZ DILATION OF LEFT ANTERIOR TIBIAL ARTERY, PERCUTANEOUS APPROACH: ICD-10-PCS | Performed by: SURGERY

## 2020-04-24 PROCEDURE — A9270 NON-COVERED ITEM OR SERVICE: HCPCS | Performed by: SURGERY

## 2020-04-24 PROCEDURE — 36415 COLL VENOUS BLD VENIPUNCTURE: CPT

## 2020-04-24 RX ORDER — HEPARIN SODIUM 1000 [USP'U]/ML
6000 INJECTION, SOLUTION INTRAVENOUS; SUBCUTANEOUS ONCE
Status: COMPLETED | OUTPATIENT
Start: 2020-04-24 | End: 2020-04-24

## 2020-04-24 RX ORDER — MIDAZOLAM HYDROCHLORIDE 1 MG/ML
INJECTION INTRAMUSCULAR; INTRAVENOUS
Status: COMPLETED
Start: 2020-04-24 | End: 2020-04-24

## 2020-04-24 RX ORDER — HEPARIN SODIUM,PORCINE 1000/ML
VIAL (ML) INJECTION
Status: COMPLETED
Start: 2020-04-24 | End: 2020-04-24

## 2020-04-24 RX ORDER — HEPARIN SODIUM 1000 [USP'U]/ML
2000 INJECTION, SOLUTION INTRAVENOUS; SUBCUTANEOUS ONCE
Status: COMPLETED | OUTPATIENT
Start: 2020-04-24 | End: 2020-04-24

## 2020-04-24 RX ORDER — PROTAMINE SULFATE 10 MG/ML
30 INJECTION, SOLUTION INTRAVENOUS ONCE
Status: COMPLETED | OUTPATIENT
Start: 2020-04-24 | End: 2020-04-24

## 2020-04-24 RX ORDER — VERAPAMIL HYDROCHLORIDE 2.5 MG/ML
INJECTION, SOLUTION INTRAVENOUS
Status: COMPLETED
Start: 2020-04-24 | End: 2020-04-24

## 2020-04-24 RX ORDER — ASPIRIN 81 MG/1
81 TABLET, CHEWABLE ORAL DAILY
Status: DISCONTINUED | OUTPATIENT
Start: 2020-04-24 | End: 2020-04-25 | Stop reason: HOSPADM

## 2020-04-24 RX ORDER — IODIXANOL 270 MG/ML
30 INJECTION, SOLUTION INTRAVASCULAR ONCE
Status: COMPLETED | OUTPATIENT
Start: 2020-04-24 | End: 2020-04-24

## 2020-04-24 RX ORDER — SODIUM CHLORIDE 9 MG/ML
500 INJECTION, SOLUTION INTRAVENOUS
Status: ACTIVE | OUTPATIENT
Start: 2020-04-24 | End: 2020-04-24

## 2020-04-24 RX ORDER — PROTAMINE SULFATE 10 MG/ML
INJECTION, SOLUTION INTRAVENOUS
Status: COMPLETED
Start: 2020-04-24 | End: 2020-04-24

## 2020-04-24 RX ORDER — MIDAZOLAM HYDROCHLORIDE 1 MG/ML
.5-2 INJECTION INTRAMUSCULAR; INTRAVENOUS PRN
Status: ACTIVE | OUTPATIENT
Start: 2020-04-24 | End: 2020-04-24

## 2020-04-24 RX ADMIN — MIDAZOLAM HYDROCHLORIDE 0.5 MG: 1 INJECTION INTRAMUSCULAR; INTRAVENOUS at 08:45

## 2020-04-24 RX ADMIN — INSULIN GLARGINE 5 UNITS: 100 INJECTION, SOLUTION SUBCUTANEOUS at 17:46

## 2020-04-24 RX ADMIN — METHIMAZOLE 5 MG: 5 TABLET ORAL at 11:36

## 2020-04-24 RX ADMIN — PROTAMINE SULFATE 30 MG: 10 INJECTION, SOLUTION INTRAVENOUS at 10:22

## 2020-04-24 RX ADMIN — OMEPRAZOLE 20 MG: 20 CAPSULE, DELAYED RELEASE ORAL at 21:25

## 2020-04-24 RX ADMIN — ATORVASTATIN CALCIUM 40 MG: 20 TABLET, FILM COATED ORAL at 21:25

## 2020-04-24 RX ADMIN — Medication 1334 MG: at 11:36

## 2020-04-24 RX ADMIN — METHIMAZOLE 5 MG: 5 TABLET ORAL at 17:46

## 2020-04-24 RX ADMIN — Medication 1334 MG: at 17:46

## 2020-04-24 RX ADMIN — HEPARIN SODIUM 6000 UNITS: 1000 INJECTION, SOLUTION INTRAVENOUS; SUBCUTANEOUS at 09:01

## 2020-04-24 RX ADMIN — FENTANYL CITRATE 25 MCG: 50 INJECTION, SOLUTION INTRAMUSCULAR; INTRAVENOUS at 08:45

## 2020-04-24 RX ADMIN — ASPIRIN 81 MG 81 MG: 81 TABLET ORAL at 11:36

## 2020-04-24 RX ADMIN — FENTANYL CITRATE 25 MCG: 50 INJECTION, SOLUTION INTRAMUSCULAR; INTRAVENOUS at 09:36

## 2020-04-24 RX ADMIN — HEPARIN SODIUM 2000 UNITS: 1000 INJECTION, SOLUTION INTRAVENOUS; SUBCUTANEOUS at 09:35

## 2020-04-24 RX ADMIN — INSULIN LISPRO 2 UNITS: 100 INJECTION, SOLUTION INTRAVENOUS; SUBCUTANEOUS at 17:46

## 2020-04-24 RX ADMIN — FENTANYL CITRATE 25 MCG: 0.05 INJECTION, SOLUTION INTRAMUSCULAR; INTRAVENOUS at 08:45

## 2020-04-24 RX ADMIN — CARVEDILOL 3.12 MG: 3.12 TABLET, FILM COATED ORAL at 17:46

## 2020-04-24 RX ADMIN — IODIXANOL 30 ML: 270 INJECTION, SOLUTION INTRAVASCULAR at 10:45

## 2020-04-24 RX ADMIN — MIDAZOLAM HYDROCHLORIDE 0.5 MG: 1 INJECTION, SOLUTION INTRAMUSCULAR; INTRAVENOUS at 08:45

## 2020-04-24 ASSESSMENT — ENCOUNTER SYMPTOMS
DOUBLE VISION: 0
PHOTOPHOBIA: 0
SHORTNESS OF BREATH: 0
BLURRED VISION: 0
MYALGIAS: 0
SEIZURES: 0
HEARTBURN: 0
SORE THROAT: 0
VOMITING: 0
DIZZINESS: 0
SPEECH CHANGE: 0
PALPITATIONS: 0
NAUSEA: 0
HEADACHES: 0
EYE REDNESS: 0
BLOOD IN STOOL: 0
DEPRESSION: 0
HEMOPTYSIS: 0
CHILLS: 0
ORTHOPNEA: 0
SENSORY CHANGE: 0
SPUTUM PRODUCTION: 0
COUGH: 0
FOCAL WEAKNESS: 0
EYE PAIN: 0
FEVER: 0
BRUISES/BLEEDS EASILY: 0
HALLUCINATIONS: 0
MEMORY LOSS: 0
STRIDOR: 0
POLYDIPSIA: 0
ABDOMINAL PAIN: 0

## 2020-04-24 ASSESSMENT — PATIENT HEALTH QUESTIONNAIRE - PHQ9
SUM OF ALL RESPONSES TO PHQ9 QUESTIONS 1 AND 2: 0
2. FEELING DOWN, DEPRESSED, IRRITABLE, OR HOPELESS: NOT AT ALL
1. LITTLE INTEREST OR PLEASURE IN DOING THINGS: NOT AT ALL

## 2020-04-24 NOTE — OP REPORT
OPERATIVE REPORT      Patient:  Luis Sepulveda     Procedure Date:  04/24/20    Indication:  The patient presents for left lower extremity angiogram for critical limb ischemia. Diagnostic angiography is indicated as there is no pre-operative CT scan to guide therapy.    Pre-Operative Diagnosis:  ESRD  DM  PAD  LLE critical limb ischemia, tissue loss to L 1st toe    Post-Operative Diagnosis:  Same    Procedure(s):   Diagnostics  US guided access of the right common femoral artery  Introduction of catheter to aorta  Abdominal aortogram  Selective catheterization of the left common femoral artery (2nd order cath)  Left lower extremity angiogram  Selective catheterization of the left anterior tibial artery (initial 3rd order cath)  Selective catheterization of the left peroneal artery (additional 3rd order cath)  Additional selective angiographic views x 5     Intervention(s)  Left anterior tibial artery angioplasty  Percutaneous thrombectomy of left peroneal artery  Left peroneal artery angioplasty    Moderate Sedation, 105 minutes    Stent(s) Placed:  None    Surgeon:  Mario Encinas M.D.    Assistant:  None    Anesthesia:  Moderate Sedation    EBL:  10cc    Radiation:  17.4mGy    Contrast:  30cc    Specimen:  None    Complications:  None    Findings:  Aortogram and bilateral iliac angiogram showed no flow-limiting lesion in the infrarenal aorta or iliacs. Bilateral IIAs are patent.    LLE angiogram showed wide patency of the CFA, profunda, SFA, and popliteal.    The AT is patent to the foot but heavily diseased with multifocal high-grade stenosis. The PT is flush-occluded at its origin with essentially no reconstitution. The peroneal is patent to its bifurcation at the ankle with high-grade stenosis in the distal 1/3 and moderate stenosis at the ostium.    I attempted to perform atherectomy of the AT but the Viper wire seized in the catheter despite multiple maneuvers and could not be  passed. Angioplasty was performed using a 2mm balloone distally and a 3mm balloon proximally.    During the procedure, clot formed in the mid-peroneal artery despite heparinization and re-bolusing. Percutaneous thrombectomy followed by angioplasty using a 3mm balloon was performed.    Completion imaging showed rapid two-vessel inline flow to the foot via the AT and peroneal. The pedal arch is heavily diseased with significantly improved flow to the midfoot but scant filling of the toes.    Procedure:  Informed consent was obtained from the patient in the pre-operative holding area. All risks, benefits, and alternatives were explained and he elected to proceed. The patient was brought to the interventional suite and placed on the table in a supine position. A time-out was performed verifying the correct site of surgery. The patient was prepped and draped in the usual sterile fashion.    A trained nurse acting under my direct supervision administered conscious sedation using fentanyl and versed. Pulse oximetry and continuous EKG tracings were monitored throughout the case. Total sedation time was 105 minutes.    I began by accessing the right common femoral artery under ultrasound visualization. The vessel was patent and the needle was visualized entering the artery. Images were saved in the medical record.    A 5 [place]an sheath was inserted retrograde in the femoral artery. The patient was given 100U/kg heparin which was rebolused every 30 minutes.    I then advanced a flush catheter over a wire into the abdominal aorta and performed an aortogram with findings noted above. I then crossed the bifurcation and selected the left common femoral artery and performed left lower extremity angiography. Findings are noted above.     Once diagnostic imaging was obtained, I exchanged for a stiff wire and up-sized to a 6 [place]an sheath which was advanced into the left SFA.     The left anterior tibial artery was selectively  catheterized with 0.014 wire and additional selective imaging was performed. Balloon angioplasty of the entire vessel was performed using a 2mm balloon distally and a 3mm balloon proximally.     The catheter was repositioned into the TP trunk and additional selective angiography of the TP trunk and peroneal artery were performed. The mid-portion of the peroneal had thrombosed.    I selectively catheterized the peroneal artery and performed percutaneous thrombectomy to extract the clot. Additional angiographic views were obtained three times during thrombectomy until restoration of flow was obtained. There was ongoing focal occlusion in the distal 1/3 in the vicinity of heavy calcification. Angioplasty using a 3mm balloon was performed.    Completion imaging was then performed with findings noted above.     This concluded the procedure. The sheath in the access site was removed and the artery was closed with a 6 Bulgarian angioseal. The patient was recovered from sedation and taken to the recovery room in stable condition.      Mario Encinas M.D.  Vascular Surgeon  [facility]

## 2020-04-24 NOTE — CARE PLAN
Problem: Infection  Goal: Will remain free from infection  Outcome: PROGRESSING AS EXPECTED  Note: Pt afebrile. Pt receives antibiotics after dialysis.     Problem: Knowledge Deficit  Goal: Knowledge of disease process/condition, treatment plan, diagnostic tests, and medications will improve  Outcome: PROGRESSING AS EXPECTED  Note: Questions pertaining to patient's plan of care were answered to patient satisfaction.

## 2020-04-24 NOTE — PROGRESS NOTES
Daily Progress Note:     Date of Service: 4/24/2020  Primary Team: [employer] Blue Team   Attending: Valdez White M.D.   Senior Resident: Dr. Callejas  Intern: Dr. Ortiz  Contact:  [phone]    Chief Complaint:   Left big toe ulcer      HPI:  75 yo male with PMH of ESRD (on hemodialysis), CAD status post stents in proximal and mid LAD, IDDM, atrial fibrillation on apixaban, hyperthyroidism and GERD. Patient sent to the hospital by his podiatrist after noticing left big toe ulcer with associated erythema and swelling of his left foot and calf. Noticed first 3 days back after he came out of the shower, worsened over time. Patient denied any fever, chills, pain and discharge. Patient was hemodynamically stable in the ER. CBC and CMP w/o any significant findings. ESR and CRP were elevated. Xray foot was negative for OM, showed some gas consistent w/ open ulcer. Patient was admitted for further investigation of the ulcer. Arterial LLE US shows significant arterial stenosis.      Interval  -No acute overnight events reported. Pt in NAD  -LLE angiogram with left anterior tibial and left peroneal artery angioplasty done today      Consultants/Specialty:  Limb preservation  Vascular Surgery  Nephrology        Review of Systems:    Review of Systems   Constitutional: Negative for chills, fever and malaise/fatigue.   HENT: Negative for congestion and ear pain.    Eyes: Negative for blurred vision, double vision and photophobia.   Respiratory: Negative for cough, sputum production, shortness of breath and stridor.    Cardiovascular: Negative for chest pain, palpitations, orthopnea and leg swelling.   Gastrointestinal: Negative for abdominal pain, blood in stool, heartburn, nausea and vomiting.   Genitourinary: Negative for dysuria and hematuria.   Musculoskeletal: Negative for joint pain and myalgias.   Skin: Negative for rash.   Neurological: Negative for dizziness, sensory change, speech change, focal weakness, seizures and  headaches.   Psychiatric/Behavioral: Negative for depression and memory loss.       Objective Data:   Physical Exam:   Vitals:   Temp:  [36.8 °C (98.2 °F)-37.5 °C (99.5 °F)] 36.8 °C (98.2 °F)  Pulse:  [] 83  Resp:  [14-21] 16  BP: (128-155)/(56-72) 130/57  SpO2:  [94 %-100 %] 96 %       Physical Exam  Constitutional:       General: He is not in acute distress.     Appearance: Normal appearance. He is normal weight. He is not ill-appearing or toxic-appearing.      Comments: Pleasant elderly male   HENT:      Head: Normocephalic and atraumatic.      Nose: Nose normal.      Mouth/Throat:      Mouth: Mucous membranes are moist.      Pharynx: No oropharyngeal exudate.      Comments: Mallampati score class I  Eyes:      Extraocular Movements: Extraocular movements intact.      Conjunctiva/sclera: Conjunctivae normal.      Pupils: Pupils are equal, round, and reactive to light.   Neck:      Musculoskeletal: Normal range of motion and neck supple. No neck rigidity.   Cardiovascular:      Rate and Rhythm: Normal rate and regular rhythm.      Pulses: Normal pulses.      Heart sounds: Normal heart sounds. No murmur. No friction rub. No gallop.    Pulmonary:      Effort: Pulmonary effort is normal. No respiratory distress.      Breath sounds: Normal breath sounds. No wheezing or rales.   Abdominal:      General: Abdomen is flat. Bowel sounds are normal. There is no distension.      Palpations: Abdomen is soft.      Tenderness: There is no abdominal tenderness. There is no guarding.   Genitourinary:     Comments: Refused JOSE  Musculoskeletal: Normal range of motion.      Left lower leg: Left lower leg edema: edema in left foot uptothe ankle (improved)   Skin:     General: Skin is warm.      Coloration: Skin is not jaundiced.      Findings: Erythema (around great toe) present. No bruising or lesion.      Comments: 0.5-1 cm round wound on the tip of big toe with dried black base without any discharge, surrounding erythema  improved   Neurological:      General: No focal deficit present.      Mental Status: He is alert and oriented to person, place, and time. Mental status is at baseline.      Motor: No weakness.   Psychiatric:         Mood and Affect: Mood normal.         Behavior: Behavior normal.                   * Peripheral vascular disease (HCC)  Assessment & Plan  Arterial US consistent with significant left posterior and anterior tibial artery occlusion.     -Vascular Surgery on board.  -LLE angiogram with angioplasty done today    Ulcer of left foot- (present on admission)  Assessment & Plan  Patient admitted after he was sent to ER by his podiatrist for left big toe ulcer with black base and associated erythema and edema extending to involve left foot and mid calf. No systemic symptoms , pain , discharge. Elevated ESR , CRP , normal WBC. Xray negative for OM. Limb preservation and vascular surgery consulted.    -Continue IV Cefazolin  -Follow blood and wound cx  -Significant left sided arterial occlusion on arterial US  -LLE angiogram with angioplasty done today    Diabetes mellitus type 2 in nonobese ([facility])  Assessment & Plan  Patient has a history of  IDT2DM with ESRD and peripheral neuropathy. Most recent  HbA1c 3 weeks ago was 5.8    -Continue home Lantus 5 units once daily  -Continue diabetic diet  -Accu checks, hypoglycemia protocol        Paroxysmal A-fib ([facility])- (present on admission)  Assessment & Plan  Hx of paroxysmal A-fib    On Eliquis      End stage renal disease on dialysis ([facility])- (present on admission)  Assessment & Plan  -Patient has a history of end-stage renal disease, on hemodialysis (on Tue/Thu/Sat)  -Follows up with Dr. [facility].  -No symptoms of uremia or volume overload.  -Cr 2.81 , BUN 17, K 4 on admission  -HD TTSa  -Nephrology consulted      Anemia of chronic disease- (present on admission)  Assessment & Plan  Stable   -Hemoglobin 11.7 on admission ; normal MCV normocytic anemia   -Likely  secondary to end-stage renal disease         CAD (coronary artery disease)- (present on admission)  Assessment & Plan  History of CAD s/p  stents in proximal and mid LAD 13yrs ago    -Continue clopidogrel, statin, beta-blocker       Dyslipidemia- (present on admission)  Assessment & Plan  Continue Atorvastatin 40 mg daily      Hyperthyroidism- (present on admission)  Assessment & Plan  Most recent TSH 0.005 and FT4 2.55 4/2020    -Continue Methimazole

## 2020-04-24 NOTE — PROGRESS NOTES
IR RN note:     LLE angiogram by MD Encinas       Sedation given per provider direction. Patient appeared to tolerate procedure, patient awake and talking post procedure.    Report given to AJN Warren. Pt transported to [facility] via IR RN, then transferred care.    Angio-seal @ [id number]

## 2020-04-24 NOTE — PROGRESS NOTES
[facility] -  PROGRESS NOTE               Author: Marvin Mahajan M.D. Date & Time: 4/24/2020  9:10 AM     HPI:  73 yo M PMH ESRD TTS iHD, HTN, type 2 DM, anemia of CKD, and CKD-MBD here for CC as above.  He was sent to ED by his PCP after a visit with them where they noted redness/swelling over his left great toe.  He had pain at the site and around it as well.  He initially noted a small wound over the plantar surface of the toe about two days ago; he denies any trauma to that area.  The size of the wound has not enlarged and no bloody discharge noted either.  The pain and swelling worsened and started tracking up the leg so he went to his PCP yesterday and started down this cascade.  He has not tried any medications for it.  No aggravating or alleviating factors.  No other hx of foot ulcers in past.  He otherwise denies F/C/N/V/CP/SOB.  No melena, hematochezia, hematemesis.  No HA, visual changes, or abdominal pain.  In the ED imaging was done and foot XR did not show any OM signs, but did show possibly some gas within soft tissues and so he was admitted to inpatient service.     DAILY NEPHROLOGY SUMMARY:  4/22 - Consult done  4/23 - NAEO, SOD, no complaints  4/24 - NATROY, no complaints, taken down for angiogram today    REVIEW OF SYSTEMS:    Review of Systems   Constitutional: Negative for fever and malaise/fatigue.   HENT: Negative for ear pain and sore throat.    Eyes: Negative for pain and redness.   Respiratory: Negative for cough and hemoptysis.    Cardiovascular: Positive for leg swelling. Negative for chest pain.   Gastrointestinal: Negative for abdominal pain and nausea.   Musculoskeletal: Positive for joint pain. Negative for myalgias.   Skin: Negative for itching and rash.   Neurological: Negative for dizziness and headaches.   Endo/Heme/Allergies: Negative for polydipsia. Does not bruise/bleed easily.   Psychiatric/Behavioral: Negative for depression and hallucinations.   All  "other systems reviewed and are negative.    PAST FAMILY HISTORY: Reviewed and unchanged since admission  PAST SURGICAL HISTORY:  Reviewed and unchanged since admission  SOCIAL HISTORY:  Reviewed and unchanged since admission  FAMILY HISTORY: Reviewed and unchanged since admission  CURRENT MEDICATIONS: Reviewed since admission to present  IMAGING STUDIES: Reviewed since admission to present  LABORATORY STUDIES: Reviewed since admission to present    PHYSICAL EXAM:  VS:  /57   Pulse 83   Temp 36.8 °C (98.2 °F) (Temporal)   Resp 16   Ht 1.651 m (5' 5\")   Wt 75.2 kg (165 lb 12.6 oz)   SpO2 96%   BMI 27.59 kg/m²   Physical Exam  Vitals signs and nursing note reviewed.   Constitutional:       General: He is not in acute distress.     Appearance: Normal appearance.   HENT:      Head: Normocephalic and atraumatic.      Right Ear: External ear normal.      Left Ear: External ear normal.      Nose: Nose normal. No congestion.      Mouth/Throat:      Mouth: Mucous membranes are moist.      Pharynx: No oropharyngeal exudate.   Eyes:      General:         Right eye: No discharge.         Left eye: No discharge.      Extraocular Movements: Extraocular movements intact.   Neck:      Musculoskeletal: Normal range of motion and neck supple. No muscular tenderness.   Cardiovascular:      Rate and Rhythm: Normal rate and regular rhythm.      Heart sounds: Normal heart sounds. No murmur.   Pulmonary:      Effort: Pulmonary effort is normal.      Breath sounds: Normal breath sounds.   Abdominal:      General: Bowel sounds are normal.      Palpations: Abdomen is soft.   Musculoskeletal:         General: Swelling (Left foot) and tenderness (Left foot) present.   Skin:     General: Skin is warm and dry.      Findings: Erythema present. No lesion.   Neurological:      Mental Status: He is alert and oriented to person, place, and time. Mental status is at baseline.      Motor: No weakness.   Psychiatric:         Mood and Affect: " Mood normal.         Behavior: Behavior normal.     Fluids:  In: 500 [Dialysis:500]  Out: 2000     LABS:  Recent Results (from the past 24 hour(s))   ACCU-CHEK GLUCOSE    Collection Time: 04/23/20  1:12 PM   Result Value Ref Range    Glucose - Accu-Ck 115 (H) 65 - 99 mg/dL   FREE THYROXINE    Collection Time: 04/23/20  2:13 PM   Result Value Ref Range    Free T-4 1.43 0.53 - 1.43 ng/dL   ACCU-CHEK GLUCOSE    Collection Time: 04/23/20  6:15 PM   Result Value Ref Range    Glucose - Accu-Ck 154 (H) 65 - 99 mg/dL   Prothrombin Time    Collection Time: 04/24/20 12:28 AM   Result Value Ref Range    PT 15.6 (H) 12.0 - 14.6 sec    INR 1.21 (H) 0.87 - 1.13   APTT    Collection Time: 04/24/20 12:28 AM   Result Value Ref Range    APTT 31.4 24.7 - 36.0 sec   CBC WITH DIFFERENTIAL    Collection Time: 04/24/20 12:28 AM   Result Value Ref Range    WBC 7.4 4.8 - 10.8 K/uL    RBC 3.57 (L) 4.70 - 6.10 M/uL    Hemoglobin 11.5 (L) 14.0 - 18.0 g/dL    Hematocrit 32.8 (L) 42.0 - 52.0 %    MCV 91.9 81.4 - 97.8 fL    MCH 32.2 27.0 - 33.0 pg    MCHC 35.1 33.7 - 35.3 g/dL    RDW 47.5 35.9 - 50.0 fL    Platelet Count 180 164 - 446 K/uL    MPV 8.8 (L) 9.0 - 12.9 fL    Neutrophils-Polys 73.70 (H) 44.00 - 72.00 %    Lymphocytes 14.10 (L) 22.00 - 41.00 %    Monocytes 10.70 0.00 - 13.40 %    Eosinophils 0.40 0.00 - 6.90 %    Basophils 0.30 0.00 - 1.80 %    Immature Granulocytes 0.80 0.00 - 0.90 %    Nucleated RBC 0.00 /100 WBC    Neutrophils (Absolute) 5.41 1.82 - 7.42 K/uL    Lymphs (Absolute) 1.04 1.00 - 4.80 K/uL    Monos (Absolute) 0.79 0.00 - 0.85 K/uL    Eos (Absolute) 0.03 0.00 - 0.51 K/uL    Baso (Absolute) 0.02 0.00 - 0.12 K/uL    Immature Granulocytes (abs) 0.06 0.00 - 0.11 K/uL    NRBC (Absolute) 0.00 K/uL   Comp Metabolic Panel    Collection Time: 04/24/20 12:28 AM   Result Value Ref Range    Sodium 134 (L) 135 - 145 mmol/L    Potassium 4.1 3.6 - 5.5 mmol/L    Chloride 94 (L) 96 - 112 mmol/L    Co2 28 20 - 33 mmol/L    Anion Gap 12.0  7.0 - 16.0    Glucose 172 (H) 65 - 99 mg/dL    Bun 23 (H) 8 - 22 mg/dL    Creatinine 3.22 (H) 0.50 - 1.40 mg/dL    Calcium 9.1 8.5 - 10.5 mg/dL    AST(SGOT) 15 12 - 45 U/L    ALT(SGPT) 9 2 - 50 U/L    Alkaline Phosphatase 108 (H) 30 - 99 U/L    Total Bilirubin 0.4 0.1 - 1.5 mg/dL    Albumin 3.4 3.2 - 4.9 g/dL    Total Protein 6.3 6.0 - 8.2 g/dL    Globulin 2.9 1.9 - 3.5 g/dL    A-G Ratio 1.2 g/dL   ESTIMATED GFR    Collection Time: 04/24/20 12:28 AM   Result Value Ref Range    GFR If  23 (A) >60 mL/min/1.73 m 2    GFR If Non  19 (A) >60 mL/min/1.73 m 2   ACCU-CHEK GLUCOSE    Collection Time: 04/24/20  8:02 AM   Result Value Ref Range    Glucose - Accu-Ck 124 (H) 65 - 99 mg/dL       (click the triangle to expand results)    IMPRESSION:  - ESRD    * Etiology likely 2/2 HTN/DM    * TTS iHD @ [place] [facility]  - Left foot open wound    * Likely diabetic related    * Angiogram planned to evaluate blood flow  - HTN    * Goal BP < 140/90    * Stable  - Anemia of CKD    * Goal Hgb 10-11    * Stable  - CKD-MBD    * Managed at HD unit  - HLD    * On statin  - CAD    * On statin     PLAN:  - TTS iHD  - UF as tolerated  - LE angiogram today  - Continue home PO4 binders  - No dietary protein restrictions  - Abx per primary team, dosed appropriately for ESRD   - Dose all meds per ESRD    All prior notes form other doctors and RN staff were reviewed from admission to current day to help me make my clinical decisions

## 2020-04-24 NOTE — CARE PLAN
Problem: Safety  Goal: Will remain free from falls  Outcome: PROGRESSING AS EXPECTED   Patient ambulates independently with steady gait.    Problem: Venous Thromboembolism (VTW)/Deep Vein Thrombosis (DVT) Prevention:  Goal: Patient will participate in Venous Thrombosis (VTE)/Deep Vein Thrombosis (DVT)Prevention Measures  Outcome: PROGRESSING AS EXPECTED   SCDs in use. Eliquis on hold for procedure tomorrow.    Problem: Pain Management  Goal: Pain level will decrease to patient's comfort goal  Outcome: PROGRESSING AS EXPECTED   Pain well controlled.

## 2020-04-24 NOTE — CARE PLAN
Problem: Safety  Goal: Will remain free from injury  Outcome: PROGRESSING AS EXPECTED  Goal: Will remain free from falls  Outcome: PROGRESSING AS EXPECTED  Note: Calls for assistance when appropriate. Call light and personal belongings within reach. Bed in low and locked position. Fall education provided to patient.       Problem: Venous Thromboembolism (VTW)/Deep Vein Thrombosis (DVT) Prevention:  Goal: Patient will participate in Venous Thrombosis (VTE)/Deep Vein Thrombosis (DVT)Prevention Measures  Outcome: PROGRESSING AS EXPECTED  Flowsheets (Taken 4/24/2020 0808)  Risk Assessment Score: 1  VTE RISK: Moderate  Mechanical Prophylaxis: SCDs, Sequential Compression Device  SCDs, Sequential Compression Device: On  Pharmacologic Prophylaxis Used: Contraindicated per MD  Note: Monitor for anticoagulation complications and contraindications.  Ensure patient wears scds when in bed if ordered.  Encourage frequent ambulation if appropriate.  Administer medication as ordered and seen in eMAR.

## 2020-04-24 NOTE — DOCUMENTATION QUERY
[facility]                                                                       Query Response Note      PATIENT:               KLARISSA BRADSHAW  ACCT #:                  [id number]  MRN:                     [id number]  :                      1945  ADMIT DATE:       2020 4:17 PM  DISCH DATE:          RESPONDING  PROVIDER #:        [id number]           QUERY TEXT:    HFrEF is documented in the Medical Record without specified acuity. Please document the acuity (includes probable or suspected)    NOTE:  If an appropriate response is not listed below, please respond with a new note.      The patient's Clinical Indicators include:  - Findings:  HFrEF per progress notes starting on  without specified acuity.  LVEF 45% per echocardiogram dated .   - Treatments:  echocardiogram, Coreg, ACE/ARB, ASA, statin, apixaban   - Risk factors:  hypertension, DM, renal failure, heart failure, cellulitis  Options provided:   -- Acute Systolic heart failure   -- Chronic Systolic heart failure   -- Acute on Chronic Systolic heart failure   -- Unable to determine      Query created by: Mary Kay Lange on 2020 9:59 AM    RESPONSE TEXT:    Chronic Systolic heart failure          Electronically signed by:  KRISTINA HAGAN MD 2020 2:46 PM

## 2020-04-25 VITALS
SYSTOLIC BLOOD PRESSURE: 105 MMHG | TEMPERATURE: 98.4 F | WEIGHT: 161.6 LBS | BODY MASS INDEX: 26.92 KG/M2 | HEIGHT: 65 IN | RESPIRATION RATE: 16 BRPM | OXYGEN SATURATION: 94 % | HEART RATE: 82 BPM | DIASTOLIC BLOOD PRESSURE: 70 MMHG

## 2020-04-25 LAB
ALBUMIN SERPL BCP-MCNC: 3.4 G/DL (ref 3.2–4.9)
ALBUMIN/GLOB SERPL: 1.2 G/DL
ALP SERPL-CCNC: 110 U/L (ref 30–99)
ALT SERPL-CCNC: <5 U/L (ref 2–50)
ANION GAP SERPL CALC-SCNC: 13 MMOL/L (ref 7–16)
AST SERPL-CCNC: 13 U/L (ref 12–45)
BASOPHILS # BLD AUTO: 0.4 % (ref 0–1.8)
BASOPHILS # BLD: 0.03 K/UL (ref 0–0.12)
BILIRUB SERPL-MCNC: 0.5 MG/DL (ref 0.1–1.5)
BUN SERPL-MCNC: 39 MG/DL (ref 8–22)
CALCIUM SERPL-MCNC: 9.2 MG/DL (ref 8.5–10.5)
CHLORIDE SERPL-SCNC: 96 MMOL/L (ref 96–112)
CO2 SERPL-SCNC: 24 MMOL/L (ref 20–33)
CREAT SERPL-MCNC: 4.81 MG/DL (ref 0.5–1.4)
EOSINOPHIL # BLD AUTO: 0.01 K/UL (ref 0–0.51)
EOSINOPHIL NFR BLD: 0.1 % (ref 0–6.9)
ERYTHROCYTE [DISTWIDTH] IN BLOOD BY AUTOMATED COUNT: 47.2 FL (ref 35.9–50)
GAD65 AB SER IA-ACNC: <5 IU/ML (ref 0–5)
GLOBULIN SER CALC-MCNC: 2.9 G/DL (ref 1.9–3.5)
GLUCOSE BLD-MCNC: 110 MG/DL (ref 65–99)
GLUCOSE BLD-MCNC: 167 MG/DL (ref 65–99)
GLUCOSE SERPL-MCNC: 163 MG/DL (ref 65–99)
HCT VFR BLD AUTO: 31.9 % (ref 42–52)
HGB BLD-MCNC: 11 G/DL (ref 14–18)
IMM GRANULOCYTES # BLD AUTO: 0.01 K/UL (ref 0–0.11)
IMM GRANULOCYTES NFR BLD AUTO: 0.1 % (ref 0–0.9)
LYMPHOCYTES # BLD AUTO: 0.69 K/UL (ref 1–4.8)
LYMPHOCYTES NFR BLD: 10 % (ref 22–41)
MCH RBC QN AUTO: 32.1 PG (ref 27–33)
MCHC RBC AUTO-ENTMCNC: 34.5 G/DL (ref 33.7–35.3)
MCV RBC AUTO: 93 FL (ref 81.4–97.8)
MONOCYTES # BLD AUTO: 0.74 K/UL (ref 0–0.85)
MONOCYTES NFR BLD AUTO: 10.7 % (ref 0–13.4)
NEUTROPHILS # BLD AUTO: 5.42 K/UL (ref 1.82–7.42)
NEUTROPHILS NFR BLD: 78.7 % (ref 44–72)
NRBC # BLD AUTO: 0 K/UL
NRBC BLD-RTO: 0 /100 WBC
PLATELET # BLD AUTO: 203 K/UL (ref 164–446)
PMV BLD AUTO: 9 FL (ref 9–12.9)
POTASSIUM SERPL-SCNC: 4.2 MMOL/L (ref 3.6–5.5)
PROT SERPL-MCNC: 6.3 G/DL (ref 6–8.2)
RBC # BLD AUTO: 3.43 M/UL (ref 4.7–6.1)
SODIUM SERPL-SCNC: 133 MMOL/L (ref 135–145)
WBC # BLD AUTO: 6.9 K/UL (ref 4.8–10.8)

## 2020-04-25 PROCEDURE — 700102 HCHG RX REV CODE 250 W/ 637 OVERRIDE(OP): Performed by: SURGERY

## 2020-04-25 PROCEDURE — 80053 COMPREHEN METABOLIC PANEL: CPT

## 2020-04-25 PROCEDURE — 5A1D70Z PERFORMANCE OF URINARY FILTRATION, INTERMITTENT, LESS THAN 6 HOURS PER DAY: ICD-10-PCS | Performed by: INTERNAL MEDICINE

## 2020-04-25 PROCEDURE — 36415 COLL VENOUS BLD VENIPUNCTURE: CPT

## 2020-04-25 PROCEDURE — 82962 GLUCOSE BLOOD TEST: CPT

## 2020-04-25 PROCEDURE — A9270 NON-COVERED ITEM OR SERVICE: HCPCS | Performed by: STUDENT IN AN ORGANIZED HEALTH CARE EDUCATION/TRAINING PROGRAM

## 2020-04-25 PROCEDURE — 700111 HCHG RX REV CODE 636 W/ 250 OVERRIDE (IP): Performed by: STUDENT IN AN ORGANIZED HEALTH CARE EDUCATION/TRAINING PROGRAM

## 2020-04-25 PROCEDURE — A9270 NON-COVERED ITEM OR SERVICE: HCPCS | Performed by: SURGERY

## 2020-04-25 PROCEDURE — 90935 HEMODIALYSIS ONE EVALUATION: CPT

## 2020-04-25 PROCEDURE — 700102 HCHG RX REV CODE 250 W/ 637 OVERRIDE(OP): Performed by: STUDENT IN AN ORGANIZED HEALTH CARE EDUCATION/TRAINING PROGRAM

## 2020-04-25 PROCEDURE — 85025 COMPLETE CBC W/AUTO DIFF WBC: CPT

## 2020-04-25 PROCEDURE — 99239 HOSP IP/OBS DSCHRG MGMT >30: CPT | Mod: GC | Performed by: INTERNAL MEDICINE

## 2020-04-25 RX ADMIN — Medication 1334 MG: at 12:59

## 2020-04-25 RX ADMIN — METHIMAZOLE 5 MG: 5 TABLET ORAL at 14:47

## 2020-04-25 RX ADMIN — METHIMAZOLE 5 MG: 5 TABLET ORAL at 05:28

## 2020-04-25 RX ADMIN — APIXABAN 2.5 MG: 5 TABLET, FILM COATED ORAL at 12:58

## 2020-04-25 RX ADMIN — INSULIN LISPRO 2 UNITS: 100 INJECTION, SOLUTION INTRAVENOUS; SUBCUTANEOUS at 13:02

## 2020-04-25 RX ADMIN — ASPIRIN 81 MG 81 MG: 81 TABLET ORAL at 05:28

## 2020-04-25 RX ADMIN — CLOPIDOGREL BISULFATE 75 MG: 75 TABLET ORAL at 05:28

## 2020-04-25 RX ADMIN — HEPARIN SODIUM 3700 UNITS: 1000 INJECTION, SOLUTION INTRAVENOUS; SUBCUTANEOUS at 11:37

## 2020-04-25 RX ADMIN — CEFAZOLIN SODIUM 2 G: 2 INJECTION, SOLUTION INTRAVENOUS at 16:28

## 2020-04-25 ASSESSMENT — ENCOUNTER SYMPTOMS
PALPITATIONS: 0
ORTHOPNEA: 0
FEVER: 0
NAUSEA: 0
HEARTBURN: 0
DIZZINESS: 0
STRIDOR: 0
CHILLS: 0
SORE THROAT: 0
MEMORY LOSS: 0
SENSORY CHANGE: 0
COUGH: 0
SPUTUM PRODUCTION: 0
SEIZURES: 0
EYE PAIN: 0
SHORTNESS OF BREATH: 0
EYE REDNESS: 0
DOUBLE VISION: 0
MYALGIAS: 0
ABDOMINAL PAIN: 0
BLOOD IN STOOL: 0
BRUISES/BLEEDS EASILY: 0
DEPRESSION: 0
SPEECH CHANGE: 0
FOCAL WEAKNESS: 0
PHOTOPHOBIA: 0
HEMOPTYSIS: 0
VOMITING: 0
BLURRED VISION: 0
POLYDIPSIA: 0
HEADACHES: 0
HALLUCINATIONS: 0

## 2020-04-25 ASSESSMENT — FIBROSIS 4 INDEX: FIB4 SCORE: 2.29

## 2020-04-25 NOTE — PROGRESS NOTES
[facility] -  PROGRESS NOTE               Author: Jordin Mendoza M.D. Date & Time: 4/25/2020  11:27 AM     HPI:  75 yo M PMH ESRD TTS iHD, HTN, type 2 DM, anemia of CKD, and CKD-MBD here for CC as above.  He was sent to ED by his PCP after a visit with them where they noted redness/swelling over his left great toe.  He had pain at the site and around it as well.  He initially noted a small wound over the plantar surface of the toe about two days ago; he denies any trauma to that area.  The size of the wound has not enlarged and no bloody discharge noted either.  The pain and swelling worsened and started tracking up the leg so he went to his PCP yesterday and started down this cascade.  He has not tried any medications for it.  No aggravating or alleviating factors.  No other hx of foot ulcers in past.  He otherwise denies F/C/N/V/CP/SOB.  No melena, hematochezia, hematemesis.  No HA, visual changes, or abdominal pain.  In the ED imaging was done and foot XR did not show any OM signs, but did show possibly some gas within soft tissues and so he was admitted to inpatient service.     DAILY NEPHROLOGY SUMMARY:  4/22 - Consult done  4/23 - ALEXANDRO SOD, no complaints  4/24 - NAEO, no complaints, taken down for angiogram today  4/25 - Had  LLE angiogram and stents placement yesterday.Seen on dialysis.Feels OK.    REVIEW OF SYSTEMS:    Review of Systems   Constitutional: Negative for fever and malaise/fatigue.   HENT: Negative for ear pain and sore throat.    Eyes: Negative for pain and redness.   Respiratory: Negative for cough and hemoptysis.    Cardiovascular: Positive for leg swelling. Negative for chest pain.   Gastrointestinal: Negative for abdominal pain and nausea.   Musculoskeletal: Positive for joint pain. Negative for myalgias.   Skin: Negative for itching and rash.   Neurological: Negative for dizziness and headaches.   Endo/Heme/Allergies: Negative for polydipsia. Does not  "bruise/bleed easily.   Psychiatric/Behavioral: Negative for depression and hallucinations.   All other systems reviewed and are negative.    PAST FAMILY HISTORY: Reviewed and unchanged since admission  PAST SURGICAL HISTORY:  Reviewed and unchanged since admission  SOCIAL HISTORY:  Reviewed and unchanged since admission  FAMILY HISTORY: Reviewed and unchanged since admission  CURRENT MEDICATIONS: Reviewed since admission to present  IMAGING STUDIES: Reviewed since admission to present  LABORATORY STUDIES: Reviewed since admission to present    PHYSICAL EXAM:  VS:  /62   Pulse 80   Temp 37.1 °C (98.8 °F) (Temporal)   Resp 16   Ht 1.651 m (5' 5\")   Wt 73.3 kg (161 lb 9.6 oz)   SpO2 96%   BMI 26.89 kg/m²   Physical Exam  Vitals signs and nursing note reviewed.   Constitutional:       General: He is not in acute distress.     Appearance: Normal appearance.   HENT:      Head: Normocephalic and atraumatic.      Right Ear: External ear normal.      Left Ear: External ear normal.      Nose: Nose normal. No congestion.      Mouth/Throat:      Mouth: Mucous membranes are moist.      Pharynx: No oropharyngeal exudate.   Eyes:      General:         Right eye: No discharge.         Left eye: No discharge.      Extraocular Movements: Extraocular movements intact.   Neck:      Musculoskeletal: Normal range of motion and neck supple. No muscular tenderness.   Cardiovascular:      Rate and Rhythm: Normal rate and regular rhythm.      Heart sounds: Normal heart sounds. No murmur.   Pulmonary:      Effort: Pulmonary effort is normal.      Breath sounds: Normal breath sounds.   Abdominal:      General: Bowel sounds are normal.      Palpations: Abdomen is soft.   Musculoskeletal:         General: Swelling (Left foot) and tenderness (Left foot) present.   Skin:     General: Skin is warm and dry.      Findings: Erythema present. No lesion.   Neurological:      Mental Status: He is alert and oriented to person, place, and " time. Mental status is at baseline.      Motor: No weakness.   Psychiatric:         Mood and Affect: Mood normal.         Behavior: Behavior normal.     Fluids:  In: 720 [P.O.:720]  Out: 0     LABS:  Recent Results (from the past 24 hour(s))   ACCU-CHEK GLUCOSE    Collection Time: 04/24/20  1:05 PM   Result Value Ref Range    Glucose - Accu-Ck 89 65 - 99 mg/dL   ACCU-CHEK GLUCOSE    Collection Time: 04/24/20  5:45 PM   Result Value Ref Range    Glucose - Accu-Ck 161 (H) 65 - 99 mg/dL   ACCU-CHEK GLUCOSE    Collection Time: 04/24/20  9:24 PM   Result Value Ref Range    Glucose - Accu-Ck 158 (H) 65 - 99 mg/dL   CBC WITH DIFFERENTIAL    Collection Time: 04/25/20 12:29 AM   Result Value Ref Range    WBC 6.9 4.8 - 10.8 K/uL    RBC 3.43 (L) 4.70 - 6.10 M/uL    Hemoglobin 11.0 (L) 14.0 - 18.0 g/dL    Hematocrit 31.9 (L) 42.0 - 52.0 %    MCV 93.0 81.4 - 97.8 fL    MCH 32.1 27.0 - 33.0 pg    MCHC 34.5 33.7 - 35.3 g/dL    RDW 47.2 35.9 - 50.0 fL    Platelet Count 203 164 - 446 K/uL    MPV 9.0 9.0 - 12.9 fL    Neutrophils-Polys 78.70 (H) 44.00 - 72.00 %    Lymphocytes 10.00 (L) 22.00 - 41.00 %    Monocytes 10.70 0.00 - 13.40 %    Eosinophils 0.10 0.00 - 6.90 %    Basophils 0.40 0.00 - 1.80 %    Immature Granulocytes 0.10 0.00 - 0.90 %    Nucleated RBC 0.00 /100 WBC    Neutrophils (Absolute) 5.42 1.82 - 7.42 K/uL    Lymphs (Absolute) 0.69 (L) 1.00 - 4.80 K/uL    Monos (Absolute) 0.74 0.00 - 0.85 K/uL    Eos (Absolute) 0.01 0.00 - 0.51 K/uL    Baso (Absolute) 0.03 0.00 - 0.12 K/uL    Immature Granulocytes (abs) 0.01 0.00 - 0.11 K/uL    NRBC (Absolute) 0.00 K/uL   Comp Metabolic Panel    Collection Time: 04/25/20 12:29 AM   Result Value Ref Range    Sodium 133 (L) 135 - 145 mmol/L    Potassium 4.2 3.6 - 5.5 mmol/L    Chloride 96 96 - 112 mmol/L    Co2 24 20 - 33 mmol/L    Anion Gap 13.0 7.0 - 16.0    Glucose 163 (H) 65 - 99 mg/dL    Bun 39 (H) 8 - 22 mg/dL    Creatinine 4.81 (H) 0.50 - 1.40 mg/dL    Calcium 9.2 8.5 - 10.5 mg/dL     AST(SGOT) 13 12 - 45 U/L    ALT(SGPT) <5 2 - 50 U/L    Alkaline Phosphatase 110 (H) 30 - 99 U/L    Total Bilirubin 0.5 0.1 - 1.5 mg/dL    Albumin 3.4 3.2 - 4.9 g/dL    Total Protein 6.3 6.0 - 8.2 g/dL    Globulin 2.9 1.9 - 3.5 g/dL    A-G Ratio 1.2 g/dL   ESTIMATED GFR    Collection Time: 04/25/20 12:29 AM   Result Value Ref Range    GFR If  14 (A) >60 mL/min/1.73 m 2    GFR If Non  12 (A) >60 mL/min/1.73 m 2   ACCU-CHEK GLUCOSE    Collection Time: 04/25/20  7:37 AM   Result Value Ref Range    Glucose - Accu-Ck 110 (H) 65 - 99 mg/dL       (click the triangle to expand results)    IMPRESSION:  - ESRD    * Etiology likely 2/2 HTN/DM    * TTS iHD @ [place] [facility]  - Left foot open wound    * Likely diabetic related    * S/P angiogram / Stent LLE 4/24/20  - HTN    * Goal BP < 140/90    * Stable  - Anemia of CKD    * Goal Hgb 10-11    * Stable  - CKD-MBD    * Managed at HD unit  - HLD    * On statin  - CAD/Stents    * On statin     PLAN:  - TTS iHD  - UF as tolerated  - LE angiogram/Stents 4/24/20  - Continue home PO4 binders  - No dietary protein restrictions  - Abx per primary team, dosed appropriately for ESRD   - Dose all meds per ESRD    All prior notes form other doctors and RN staff were reviewed from admission to current day to help me make my clinical decisions

## 2020-04-25 NOTE — CARE PLAN
Problem: Safety  Goal: Will remain free from injury  Outcome: PROGRESSING AS EXPECTED   Bed in lowest position, call within reach, rounding in place, low risk for falls, pt is up self. Denies need for assistance. Ambulation steady  Problem: Infection  Goal: Will remain free from infection  Outcome: PROGRESSING AS EXPECTED   Standard precautions in place, patient educated on proper hand washing, nutrition promoted

## 2020-04-25 NOTE — DISCHARGE INSTRUCTIONS
Discharge Instructions    Discharged to home by car with relative. Discharged via walking, hospital escort: Yes.  Special equipment needed: Not Applicable    Be sure to schedule a follow-up appointment with your primary care doctor or any specialists as instructed.     Discharge Plan:   Diet Plan: Discussed  Activity Level: Discussed  Confirmed Follow up Appointment: Patient to Call and Schedule Appointment  Confirmed Symptoms Management: Discussed  Medication Reconciliation Updated: Yes  Influenza Vaccine Indication: Not indicated: Previously immunized this influenza season and > 8 years of age    I understand that a diet low in cholesterol, fat, and sodium is recommended for good health. Unless I have been given specific instructions below for another diet, I accept this instruction as my diet prescription.   Other diet: diabetic    Special Instructions: None    · Is patient discharged on Warfarin / Coumadin?   No     Depression / Suicide Risk    As you are discharged from this [facility] facility, it is important to learn how to keep safe from harming yourself.    Recognize the warning signs:  · Abrupt changes in personality, positive or negative- including increase in energy   · Giving away possessions  · Change in eating patterns- significant weight changes-  positive or negative  · Change in sleeping patterns- unable to sleep or sleeping all the time   · Unwillingness or inability to communicate  · Depression  · Unusual sadness, discouragement and loneliness  · Talk of wanting to die  · Neglect of personal appearance   · Rebelliousness- reckless behavior  · Withdrawal from people/activities they love  · Confusion- inability to concentrate     If you or a loved one observes any of these behaviors or has concerns about self-harm, here's what you can do:  · Talk about it- your feelings and reasons for harming yourself  · Remove any means that you might use to hurt yourself (examples: pills, rope, extension  "cords, firearm)  · Get professional help from the community (Mental Health, Substance Abuse, psychological counseling)  · Do not be alone:Call your Safe Contact- someone whom you trust who will be there for you.  · Call your local CRISIS HOTLINE 882-9063 or 359-093-0229  · Call your local Children's Mobile Crisis Response Team [place] (309) 912-1334 or www.[employer]  · Call the toll free National Suicide Prevention Hotlines   · National Suicide Prevention Lifeline 344-369-HJTZ (5333)  · [facility] Network 800-SUICIDE (177-1301)      Acute Ischemic Limb  Acute ischemic limb develops when an arm or leg (limb) has trouble getting enough blood. This happens when an artery (a large blood vessel that carries oxygen and nutrients that feed your body) is partially or totally blocked. It is also called critical limb ischemia. The problem affects the legs more often than the arms. It is described as acute when the condition comes on suddenly. When this happens, it is considered a medical emergency.  CAUSES   An ischemic limb almost always is the result of underlying atherosclerotic disease (when artery walls become thick and hard). It develops because of the buildup of fatty material that hardens (plaque) in arteries and veins. This buildup narrows the blood vessels and keeps tissues in the limb from getting enough blood. Small amounts of plaque can break off from the walls of the blood vessels and create a clot. This blocks the blood flow and causes ischemic limb.  Other factors that can make ischemic limb more likely are:  · Smoking.  · High blood pressure.  · Diabetes.  · High cholesterol levels.  · Abnormal heart rhythms (for example, a common one called \"atrial fibrillation\")  · Past operations on the heart and/or blood vessels.  · Past problems with blood clots.  · Past injury (such as burns or a broken bone) that damaged blood vessels in the limb.  Other conditions can cause ischemic limb, although " "these are rare. They include:  · Buerger's disease, caused by inflamed blood vessels in the hands and feet. This is also called thromboangiitis obliterans.  · Some forms of arthritis.  · Rare birth defects affecting the arteries of the legs.  SYMPTOMS   The major signs and symptoms of ischemic limb are:   · Pain in the leg while resting (also called \"rest pain\"). This is sometimes described as a burning pain in the foot that gets worse when lying down.  · The leg or arm is cool to the touch.  · The leg or arm lacks color.  · The leg or arm will not move.  · There is numbness, a tingling or a prickling sensation in the limb.  · Blood flow or pulse cannot be found by your caregiver in the arm or leg.  DIAGNOSIS   In diagnosing ischemic limb a caregiver considers:   · The person's description of how the leg or arm has been feeling.  · The results of the caregiver's own examination.  · Blood tests.  PROGNOSIS   With timely emergency treatment, there is a greater than 50-50 chance that a caregiver can fix or remove the block in the artery that is causing the ischemic limb. This could require medication and, possibly, an operation.  If medical care is not gotten quickly enough, the limb might need to be surgically removed (amputated).  TREATMENT   Possible treatments for ischemic limb include:  · Medications to break down blood clots and let blood flow more easily. These medicines must be given directly into a blood vessel and requires staying the hospital.  · Revascularization, which is surgery to improve blood flow by putting in new blood vessels.  · Angioplasty, which is surgery that places a small balloon in an artery to open blood flow.  · Surgery to place a stent (a flexible metal tube) in an artery to keep it open.  · Amputation, which is surgical removal of some or all of the affected limb.  RELATED COMPLICATIONS  People who are treated for acute ischemic limb are more likely to:  · Have more-than-normal bleeding " "caused by blood-thinning medications.  · Have ongoing pain.  · Develop an infection in the arm or leg.  If surgery is needed, possible complications could include:  · A possible need for blood transfusions.  · Infection.  · Swelling inside the limb that squeezes the blood vessels. This is called \"Compartment Syndrome\", a condition that requires more surgery.  · A long recovery time.  · Loss of mobility.  · A need for amputation.  · The need for more surgery.  Document Released: 09/26/2009 Document Revised: 03/11/2013 Document Reviewed: 09/26/2009  [employer]® Patient Information ©2014 [employer].    "

## 2020-04-25 NOTE — PROGRESS NOTES
2 RN Skin Check    2 RN skin check complete.   Devices in place: N/a.  Skin assessed under devices: yes.  Confirmed pressure ulcers found on: N/a.  New potential pressure ulcers noted on N/a. Wound consult placed No.  The following interventions in place Pillows.    Bleeding scabs on bilateral lower extremities.

## 2020-04-25 NOTE — CARE PLAN
Problem: Bowel/Gastric:  Goal: Will not experience complications related to bowel motility  Outcome: PROGRESSING AS EXPECTED  Patient understands importance of bowel motility. Pt is receiving adequate oral intake. Pt is ambulating often. Pt is aware of pharmacological stool softeners and laxatives available as needed. Pt is having regular bowel movements.     Problem: Knowledge Deficit  Goal: Knowledge of the prescribed therapeutic regimen will improve  Outcome: PROGRESSING AS EXPECTED  Patient understands treatment plan in relation to admitting diagnosis. Patient understands indications for diagnostic tests and medications.

## 2020-04-25 NOTE — PROGRESS NOTES
Progress Note    CC: f/u for left lower extremity critical limb ischemia    Interval History: 74 y.o. male who presented 4/21/2020 with left lower extremity CLI w/ an ulcer on the toe and digital gangrene. The toe is even more ischemic at the tip, but this is not surprising.      Review of Systems  Negative for chest pain or SOB    Medications  Prior to Admission Medications   Prescriptions Last Dose Informant Patient Reported? Taking?   apixaban (ELIQUIS) 2.5mg Tab 4/20/2020 at 2100 Patient Yes No   Sig: Take 2.5 mg by mouth 2 Times a Day.   atorvastatin (LIPITOR) 40 MG Tab 4/20/2020 at 2100 Patient No No   Sig: Take 1 Tab by mouth every bedtime.   calcitRIOL (ROCALTROL) 0.25 MCG Cap 4/20/2020 at 0900 Patient Yes No   Sig: Take 0.25 mcg by mouth every day.   calcium acetate (PHOS-LO) 667 MG Cap 4/20/2020 at 1800 Patient Yes No   Sig: Take 1,334 mg by mouth 3 times a day, with meals.   carvedilol (COREG) 3.125 MG Tab 4/20/2020 at 2030 Patient Yes No   Sig: Take 3.125 mg by mouth 2 times a day, with meals.   clopidogrel (PLAVIX) 75 MG Tab 4/20/2020 at 0900 Patient No No   Sig: Take 1 Tab by mouth every day.   insulin glargine (LANTUS SOLOSTAR) 100 UNIT/ML Solution Pen-injector injection 4/20/2020 at 2100 Patient Yes No   Sig: Inject 5 Units as instructed every bedtime.   methimazole (TAPAZOLE) 5 MG Tab 4/20/2020 at 2100 Patient No No   Sig: Take 1.5 tabs in am, and 1 tab in evening   pantoprazole (PROTONIX) 40 MG Tablet Delayed Response 4/20/2020 at 2100 Patient Yes No   Sig: Take 40 mg by mouth every bedtime.      Facility-Administered Medications: None         Physical Exam  Vitals:    04/25/20 1600   BP: 105/70   Pulse: 82   Resp: 16   Temp: 36.9 °C (98.4 °F)   SpO2: 94%       Pulse/Extremity Exam:    Femorals:        Right: palpable       Left palpable    Pedal Pulses:       Right DP monophasic       Right PT monophasic       Left DP palpable       Left PT absent    Wounds: quarter sized area of dry gangrene  on the tip of the first toe.        General appearance: NAD, conversing appropriately  Lungs: No inspiratory stridor or wheezing  CV: RRR  Abdomen: Soft, NT/ND      Labs reviewed today:  Recent Labs     04/23/20  0005 04/24/20  0028 04/25/20  0029   WBC 7.8 7.4 6.9   RBC 3.56* 3.57* 3.43*   HEMOGLOBIN 11.2* 11.5* 11.0*   HEMATOCRIT 33.6* 32.8* 31.9*   MCV 94.4 91.9 93.0   MCH 31.5 32.2 32.1   MCHC 33.3* 35.1 34.5   RDW 48.4 47.5 47.2   PLATELETCT 169 180 203   MPV 9.0 8.8* 9.0     Recent Labs     04/23/20  0005 04/24/20  0028 04/25/20  0029   SODIUM 134* 134* 133*   POTASSIUM 4.2 4.1 4.2   CHLORIDE 99 94* 96   CO2 22 28 24   GLUCOSE 130* 172* 163*   BUN 32* 23* 39*   CREATININE 4.32* 3.22* 4.81*   CALCIUM 9.0 9.1 9.2     Recent Labs     04/23/20  0005 04/24/20  0028 04/25/20  0029   ALTSGPT 10 9 <5   ASTSGOT 10* 15 13   ALKPHOSPHAT 103* 108* 110*   TBILIRUBIN 0.4 0.4 0.5   GLUCOSE 130* 172* 163*     Recent Labs     04/24/20  0028   APTT 31.4   INR 1.21*       Imaging & Data Review:  I personally reviewed all non-invasive vascular testing including images, x-rays, tracings, arterial waveforms, and duplex exams relevant to this admission. My interpretation is below:    Angiogram:    All images reviewed    Assessment/Plan & Medical Decision-Making:    Mr. 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  His left foot has improved perfusion with a palpable dorsal pedal pulse.  He will need follow-up in the office to consider treatment of the posterior tibial artery in the future and assess healing of the first toe.  No debridement necessary at present.     Thank you for involving us in this patient's care. Please call with questions.    Rima Howell MD  Vascular Surgeon  [facility]  Office: [phone]

## 2020-04-25 NOTE — PROGRESS NOTES
[facility] Services Progress Note     Hemodialysis treatment ordered today per Dr. SKIP Mendoza x 3.5 hours.   Treatment initiated at 0807 ended at 1137.      Patient tolerated tx; see paper flow sheet for details.      Net UF 1500 mL.      Post tx, CVC flushed with saline then locked with heparin 1000 units/mL per designated amount in each wing then clamped and capped. Aspirate heparin prior to next CVC use.     Report given to Primary RN.

## 2020-04-25 NOTE — PROGRESS NOTES
Daily Progress Note:     Date of Service: 4/25/2020  Primary Team: [facility] Blue Team   Attending: Valdez White M.D.   Senior Resident: Dr. Callejas  Intern: Dr. Ortiz  Contact:  [phone]    Chief Complaint:   Left big toe ulcer        HPI:  75 yo male with PMH of ESRD (on hemodialysis), CAD status post stents in proximal and mid LAD, IDDM, atrial fibrillation on apixaban, hyperthyroidism and GERD. Patient sent to the hospital by his podiatrist after noticing left big toe ulcer with associated erythema and swelling of his left foot and calf. Noticed first 3 days back after he came out of the shower, worsened over time. Patient denied any fever, chills, pain and discharge. Patient was hemodynamically stable in the ER. CBC and CMP w/o any significant findings. ESR and CRP were elevated. Xray foot was negative for OM, showed some gas consistent w/ open ulcer. Patient was admitted for further investigation of the ulcer. Arterial LLE US shows significant arterial stenosis.              Subjective  -No acute overnight events reported. Pt in NAD  -s/p LLE angiogram with left anterior tibial and left peroneal artery angioplasty/thrombectomy 4/24  -Dressing on right groin (angiogram/intervantion site) , no erythema . Bleeding appreciated. Patient denies pain around the site.    -HD today.  -Vascular surgery (Dr. Howell) to follow up with patient today and give recommendations for discharge. Appreciate their assistance.       Consultants/Specialty:  Limb preservation  Vascular Surgery  Nephrology             Review of Systems:   Review of Systems   Constitutional: Negative for chills, fever and malaise/fatigue.   HENT: Negative for congestion and ear pain.    Eyes: Negative for blurred vision, double vision and photophobia.   Respiratory: Negative for cough, sputum production, shortness of breath and stridor.    Cardiovascular: Negative for chest pain, palpitations, orthopnea and leg swelling.   Gastrointestinal:  Negative for abdominal pain, blood in stool, heartburn, nausea and vomiting.   Genitourinary: Negative for dysuria and hematuria.   Musculoskeletal: Negative for joint pain and myalgias.   Skin: Negative for rash.   Neurological: Negative for dizziness, sensory change, speech change, focal weakness, seizures and headaches.   Psychiatric/Behavioral: Negative for depression and memory loss.       Objective Data:   Physical Exam:   Vitals:   Temp:  [36.6 °C (97.9 °F)-37.7 °C (99.8 °F)] 37.3 °C (99.2 °F)  Pulse:  [78-96] 84  Resp:  [14-21] 18  BP: ()/(53-73) 147/62  SpO2:  [94 %-100 %] 94 %        Physical Exam  Constitutional:       General: He is not in acute distress.     Appearance: Normal appearance. He is normal weight. He is not ill-appearing or toxic-appearing.      Comments: Pleasant elderly male   HENT:      Head: Normocephalic and atraumatic.      Nose: Nose normal.      Mouth/Throat:      Mouth: Mucous membranes are moist.      Pharynx: No oropharyngeal exudate.      Comments: Mallampati score class I  Eyes:      Extraocular Movements: Extraocular movements intact.      Conjunctiva/sclera: Conjunctivae normal.      Pupils: Pupils are equal, round, and reactive to light.   Neck:      Musculoskeletal: Normal range of motion and neck supple. No neck rigidity.   Cardiovascular:      Rate and Rhythm: Normal rate and regular rhythm.      Pulses: Normal pulses.      Heart sounds: Normal heart sounds. No murmur. No friction rub. No gallop.    Pulmonary:      Effort: Pulmonary effort is normal. No respiratory distress.      Breath sounds: Normal breath sounds. No wheezing or rales.   Abdominal:      General: Abdomen is flat. Bowel sounds are normal. There is no distension.      Palpations: Abdomen is soft.      Tenderness: There is no abdominal tenderness. There is no guarding.   Genitourinary:     Comments: Refused JOSE  Musculoskeletal: Normal range of motion.      Left lower leg: Edema present.   Skin:      General: Skin is warm.      Coloration: Skin is not jaundiced.      Findings: Erythema (around great toe) present. No bruising or lesion.      Comments: 0.5-1 cm round wound on the tip of big toe with dried black base without any discharge, surrounding erythema improved   Neurological:      General: No focal deficit present.      Mental Status: He is alert and oriented to person, place, and time. Mental status is at baseline.      Motor: No weakness.   Psychiatric:         Mood and Affect: Mood normal.         Behavior: Behavior normal.               * Peripheral vascular disease (HCC)  Assessment & Plan  Arterial US consistent with significant left posterior and anterior tibial artery occlusion.     -Vascular Surgery on board.  - s/p LLE angiogram with angioplasty / thrombectomy 4/24    Ulcer of left foot- (present on admission)  Assessment & Plan  Patient admitted after he was sent to ER by his podiatrist for left big toe ulcer with black base and associated erythema and edema extending to involve left foot and mid calf. No systemic symptoms , pain , discharge. Elevated ESR , CRP , normal WBC. Xray negative for OM. Limb preservation and vascular surgery consulted.    -Continue IV Cefazolin after dialysis only per ID recs  -Follow blood and wound cx  -Significant LLE arterial disease  - s/p LLE angiogram with angioplasty 4/24     Diabetes mellitus type 2 in nonobese ([facility])  Assessment & Plan  Patient has a history of  IDT2DM with ESRD and peripheral neuropathy. Most recent  HbA1c 3 weeks ago was 5.8    -Continue home Lantus 5 units once daily  -Continue diabetic diet  -Accu checks, hypoglycemia protocol        Paroxysmal A-fib ([facility])- (present on admission)  Assessment & Plan  Hx of paroxysmal A-fib  -Continue Eliquis      End stage renal disease on dialysis ([facility])- (present on admission)  Assessment & Plan  -Patient has a history of end-stage renal disease, on hemodialysis (on Tue/Thu/Sat)  -Follows up with   [facility].  -No symptoms of uremia or volume overload.  -Cr 2.81 , BUN 17, K 4 on admission  -HD T/T/Sa  -Nephrology following      Anemia of chronic disease- (present on admission)  Assessment & Plan  Stable   -Hemoglobin 11.7 on admission ; normal MCV normocytic anemia   -Likely secondary to end-stage renal disease         CAD (coronary artery disease)- (present on admission)  Assessment & Plan  History of CAD s/p  stents in proximal and mid LAD 13yrs ago  -Continue clopidogrel, statin, beta-blocker       Dyslipidemia- (present on admission)  Assessment & Plan  Continue Atorvastatin 40 mg daily      Hyperthyroidism- (present on admission)  Assessment & Plan  Most recent TSH 0.005 and FT4 2.55 4/2020    -Continue Methimazole

## 2020-04-26 NOTE — PROGRESS NOTES
Pt has been discharged. Denies questions. Iv out. Paper work signed. Educated about meds,follow upand wound

## 2020-04-30 SDOH — ECONOMIC STABILITY: INCOME INSECURITY: HOW HARD IS IT FOR YOU TO PAY FOR THE VERY BASICS LIKE FOOD, HOUSING, MEDICAL CARE, AND HEATING?: NOT HARD AT ALL

## 2020-04-30 SDOH — ECONOMIC STABILITY: FOOD INSECURITY: WITHIN THE PAST 12 MONTHS, YOU WORRIED THAT YOUR FOOD WOULD RUN OUT BEFORE YOU GOT MONEY TO BUY MORE.: NEVER TRUE

## 2020-04-30 SDOH — ECONOMIC STABILITY: FOOD INSECURITY: WITHIN THE PAST 12 MONTHS, THE FOOD YOU BOUGHT JUST DIDN'T LAST AND YOU DIDN'T HAVE MONEY TO GET MORE.: NEVER TRUE

## 2020-04-30 SDOH — ECONOMIC STABILITY: TRANSPORTATION INSECURITY
IN THE PAST 12 MONTHS, HAS THE LACK OF TRANSPORTATION KEPT YOU FROM MEDICAL APPOINTMENTS OR FROM GETTING MEDICATIONS?: NO

## 2020-04-30 SDOH — ECONOMIC STABILITY: TRANSPORTATION INSECURITY
IN THE PAST 12 MONTHS, HAS LACK OF TRANSPORTATION KEPT YOU FROM MEETINGS, WORK, OR FROM GETTING THINGS NEEDED FOR DAILY LIVING?: NO

## 2020-04-30 NOTE — PROGRESS NOTES
VASHTI Verdugo spoke with pt via mobile phone 4/30/20. Pt has PCP and said he will make his own appt. Pt states he has a good support system with family and medical staff. Pt did not express any other needs/concerns for CCM at this time.     Community Health Worker Intake  • Social determinates of health intake completed  • Identified barriers to none  • Contact information provided to Luis  • PCP Patito Edgar    Plan:  Pt will be d/c from CCM services due to all goals met.

## 2020-05-11 ENCOUNTER — HOSPITAL ENCOUNTER (OUTPATIENT)
Dept: RADIOLOGY | Facility: MEDICAL CENTER | Age: 75
DRG: 617 | End: 2020-05-11
Attending: INTERNAL MEDICINE
Payer: MEDICARE

## 2020-05-11 DIAGNOSIS — E04.1 THYROID NODULE: ICD-10-CM

## 2020-05-11 PROCEDURE — 76536 US EXAM OF HEAD AND NECK: CPT

## 2020-05-14 ENCOUNTER — APPOINTMENT (OUTPATIENT)
Dept: RADIOLOGY | Facility: MEDICAL CENTER | Age: 75
DRG: 617 | End: 2020-05-14
Attending: EMERGENCY MEDICINE
Payer: MEDICARE

## 2020-05-14 ENCOUNTER — HOSPITAL ENCOUNTER (INPATIENT)
Facility: MEDICAL CENTER | Age: 75
LOS: 5 days | DRG: 617 | End: 2020-05-19
Attending: EMERGENCY MEDICINE | Admitting: HOSPITALIST
Payer: MEDICARE

## 2020-05-14 DIAGNOSIS — E11.8 DIABETIC FOOT (HCC): ICD-10-CM

## 2020-05-14 DIAGNOSIS — S98.132A AMPUTATION OF TOE OF LEFT FOOT (HCC): ICD-10-CM

## 2020-05-14 PROBLEM — R50.9 FEVER, UNKNOWN ORIGIN: Status: ACTIVE | Noted: 2020-05-14

## 2020-05-14 LAB
ALBUMIN SERPL BCP-MCNC: 4 G/DL (ref 3.2–4.9)
ALBUMIN/GLOB SERPL: 1.2 G/DL
ALP SERPL-CCNC: 147 U/L (ref 30–99)
ALT SERPL-CCNC: 13 U/L (ref 2–50)
ANION GAP SERPL CALC-SCNC: 17 MMOL/L (ref 7–16)
AST SERPL-CCNC: 13 U/L (ref 12–45)
BASOPHILS # BLD AUTO: 0.3 % (ref 0–1.8)
BASOPHILS # BLD: 0.03 K/UL (ref 0–0.12)
BILIRUB SERPL-MCNC: 0.8 MG/DL (ref 0.1–1.5)
BUN SERPL-MCNC: 44 MG/DL (ref 8–22)
CALCIUM SERPL-MCNC: 9.5 MG/DL (ref 8.5–10.5)
CHLORIDE SERPL-SCNC: 91 MMOL/L (ref 96–112)
CO2 SERPL-SCNC: 22 MMOL/L (ref 20–33)
COVID ORDER STATUS COVID19: NORMAL
CREAT SERPL-MCNC: 6.05 MG/DL (ref 0.5–1.4)
CRP SERPL HS-MCNC: 2.91 MG/DL (ref 0–0.75)
EKG IMPRESSION: NORMAL
EKG IMPRESSION: NORMAL
EOSINOPHIL # BLD AUTO: 0.02 K/UL (ref 0–0.51)
EOSINOPHIL NFR BLD: 0.2 % (ref 0–6.9)
ERYTHROCYTE [DISTWIDTH] IN BLOOD BY AUTOMATED COUNT: 47.7 FL (ref 35.9–50)
ERYTHROCYTE [SEDIMENTATION RATE] IN BLOOD BY WESTERGREN METHOD: 58 MM/HOUR (ref 0–20)
GLOBULIN SER CALC-MCNC: 3.3 G/DL (ref 1.9–3.5)
GLUCOSE BLD-MCNC: 113 MG/DL (ref 65–99)
GLUCOSE BLD-MCNC: 132 MG/DL (ref 65–99)
GLUCOSE BLD-MCNC: 251 MG/DL (ref 65–99)
GLUCOSE SERPL-MCNC: 194 MG/DL (ref 65–99)
HCT VFR BLD AUTO: 34.1 % (ref 42–52)
HGB BLD-MCNC: 12 G/DL (ref 14–18)
IMM GRANULOCYTES # BLD AUTO: 0.04 K/UL (ref 0–0.11)
IMM GRANULOCYTES NFR BLD AUTO: 0.4 % (ref 0–0.9)
LACTATE BLD-SCNC: 1.6 MMOL/L (ref 0.5–2)
LACTATE BLD-SCNC: 2 MMOL/L (ref 0.5–2)
LYMPHOCYTES # BLD AUTO: 0.71 K/UL (ref 1–4.8)
LYMPHOCYTES NFR BLD: 7.3 % (ref 22–41)
MCH RBC QN AUTO: 32.9 PG (ref 27–33)
MCHC RBC AUTO-ENTMCNC: 35.2 G/DL (ref 33.7–35.3)
MCV RBC AUTO: 93.4 FL (ref 81.4–97.8)
MONOCYTES # BLD AUTO: 0.53 K/UL (ref 0–0.85)
MONOCYTES NFR BLD AUTO: 5.5 % (ref 0–13.4)
NEUTROPHILS # BLD AUTO: 8.35 K/UL (ref 1.82–7.42)
NEUTROPHILS NFR BLD: 86.3 % (ref 44–72)
NRBC # BLD AUTO: 0 K/UL
NRBC BLD-RTO: 0 /100 WBC
PLATELET # BLD AUTO: 165 K/UL (ref 164–446)
PMV BLD AUTO: 8.8 FL (ref 9–12.9)
POTASSIUM SERPL-SCNC: 4.7 MMOL/L (ref 3.6–5.5)
PROT SERPL-MCNC: 7.3 G/DL (ref 6–8.2)
RBC # BLD AUTO: 3.65 M/UL (ref 4.7–6.1)
SARS-COV-2 RNA RESP QL NAA+PROBE: NOTDETECTED
SODIUM SERPL-SCNC: 130 MMOL/L (ref 135–145)
SPECIMEN SOURCE: NORMAL
WBC # BLD AUTO: 9.7 K/UL (ref 4.8–10.8)

## 2020-05-14 PROCEDURE — 770006 HCHG ROOM/CARE - MED/SURG/GYN SEMI*

## 2020-05-14 PROCEDURE — U0004 COV-19 TEST NON-CDC HGH THRU: HCPCS

## 2020-05-14 PROCEDURE — 73630 X-RAY EXAM OF FOOT: CPT | Mod: LT

## 2020-05-14 PROCEDURE — 85025 COMPLETE CBC W/AUTO DIFF WBC: CPT

## 2020-05-14 PROCEDURE — 96366 THER/PROPH/DIAG IV INF ADDON: CPT

## 2020-05-14 PROCEDURE — 85652 RBC SED RATE AUTOMATED: CPT

## 2020-05-14 PROCEDURE — 80053 COMPREHEN METABOLIC PANEL: CPT

## 2020-05-14 PROCEDURE — 700111 HCHG RX REV CODE 636 W/ 250 OVERRIDE (IP)

## 2020-05-14 PROCEDURE — 700105 HCHG RX REV CODE 258: Performed by: EMERGENCY MEDICINE

## 2020-05-14 PROCEDURE — 700102 HCHG RX REV CODE 250 W/ 637 OVERRIDE(OP): Performed by: HOSPITALIST

## 2020-05-14 PROCEDURE — 90935 HEMODIALYSIS ONE EVALUATION: CPT

## 2020-05-14 PROCEDURE — 36415 COLL VENOUS BLD VENIPUNCTURE: CPT

## 2020-05-14 PROCEDURE — G2023 SPECIMEN COLLECT COVID-19: HCPCS | Performed by: EMERGENCY MEDICINE

## 2020-05-14 PROCEDURE — 700105 HCHG RX REV CODE 258: Performed by: STUDENT IN AN ORGANIZED HEALTH CARE EDUCATION/TRAINING PROGRAM

## 2020-05-14 PROCEDURE — 700111 HCHG RX REV CODE 636 W/ 250 OVERRIDE (IP): Performed by: STUDENT IN AN ORGANIZED HEALTH CARE EDUCATION/TRAINING PROGRAM

## 2020-05-14 PROCEDURE — 700111 HCHG RX REV CODE 636 W/ 250 OVERRIDE (IP): Performed by: INTERNAL MEDICINE

## 2020-05-14 PROCEDURE — 700102 HCHG RX REV CODE 250 W/ 637 OVERRIDE(OP): Performed by: STUDENT IN AN ORGANIZED HEALTH CARE EDUCATION/TRAINING PROGRAM

## 2020-05-14 PROCEDURE — 5A1D70Z PERFORMANCE OF URINARY FILTRATION, INTERMITTENT, LESS THAN 6 HOURS PER DAY: ICD-10-PCS | Performed by: INTERNAL MEDICINE

## 2020-05-14 PROCEDURE — 87040 BLOOD CULTURE FOR BACTERIA: CPT

## 2020-05-14 PROCEDURE — 96365 THER/PROPH/DIAG IV INF INIT: CPT

## 2020-05-14 PROCEDURE — 700111 HCHG RX REV CODE 636 W/ 250 OVERRIDE (IP): Performed by: EMERGENCY MEDICINE

## 2020-05-14 PROCEDURE — 82962 GLUCOSE BLOOD TEST: CPT | Mod: 91

## 2020-05-14 PROCEDURE — 99285 EMERGENCY DEPT VISIT HI MDM: CPT

## 2020-05-14 PROCEDURE — 86140 C-REACTIVE PROTEIN: CPT

## 2020-05-14 PROCEDURE — A9270 NON-COVERED ITEM OR SERVICE: HCPCS | Performed by: STUDENT IN AN ORGANIZED HEALTH CARE EDUCATION/TRAINING PROGRAM

## 2020-05-14 PROCEDURE — 93005 ELECTROCARDIOGRAM TRACING: CPT | Performed by: EMERGENCY MEDICINE

## 2020-05-14 PROCEDURE — 83605 ASSAY OF LACTIC ACID: CPT

## 2020-05-14 PROCEDURE — 71045 X-RAY EXAM CHEST 1 VIEW: CPT

## 2020-05-14 PROCEDURE — 96367 TX/PROPH/DG ADDL SEQ IV INF: CPT

## 2020-05-14 RX ORDER — METHIMAZOLE 5 MG/1
7.5 TABLET ORAL EVERY MORNING
Status: DISCONTINUED | OUTPATIENT
Start: 2020-05-14 | End: 2020-05-19 | Stop reason: HOSPADM

## 2020-05-14 RX ORDER — OMEPRAZOLE 20 MG/1
20 CAPSULE, DELAYED RELEASE ORAL
Status: DISCONTINUED | OUTPATIENT
Start: 2020-05-14 | End: 2020-05-19 | Stop reason: HOSPADM

## 2020-05-14 RX ORDER — CALCIUM ACETATE 667 MG/1
1334 TABLET ORAL
Status: DISCONTINUED | OUTPATIENT
Start: 2020-05-14 | End: 2020-05-19 | Stop reason: HOSPADM

## 2020-05-14 RX ORDER — METHIMAZOLE 5 MG/1
5 TABLET ORAL EVERY EVENING
Status: DISCONTINUED | OUTPATIENT
Start: 2020-05-14 | End: 2020-05-19 | Stop reason: HOSPADM

## 2020-05-14 RX ORDER — INSULIN GLARGINE 100 [IU]/ML
5 INJECTION, SOLUTION SUBCUTANEOUS EVERY EVENING
Status: DISCONTINUED | OUTPATIENT
Start: 2020-05-15 | End: 2020-05-19 | Stop reason: HOSPADM

## 2020-05-14 RX ORDER — HEPARIN SODIUM 1000 [USP'U]/ML
3700 INJECTION, SOLUTION INTRAVENOUS; SUBCUTANEOUS
Status: DISCONTINUED | OUTPATIENT
Start: 2020-05-14 | End: 2020-05-19 | Stop reason: HOSPADM

## 2020-05-14 RX ORDER — INSULIN GLARGINE 100 [IU]/ML
5 INJECTION, SOLUTION SUBCUTANEOUS EVERY EVENING
Status: DISCONTINUED | OUTPATIENT
Start: 2020-05-14 | End: 2020-05-14

## 2020-05-14 RX ORDER — CARVEDILOL 3.12 MG/1
3.12 TABLET ORAL 2 TIMES DAILY WITH MEALS
Status: DISCONTINUED | OUTPATIENT
Start: 2020-05-14 | End: 2020-05-19 | Stop reason: HOSPADM

## 2020-05-14 RX ORDER — PANTOPRAZOLE SODIUM 40 MG/1
40 TABLET, DELAYED RELEASE ORAL
Status: DISCONTINUED | OUTPATIENT
Start: 2020-05-14 | End: 2020-05-14

## 2020-05-14 RX ORDER — ACETAMINOPHEN 325 MG/1
650 TABLET ORAL EVERY 6 HOURS PRN
Status: DISCONTINUED | OUTPATIENT
Start: 2020-05-14 | End: 2020-05-19 | Stop reason: HOSPADM

## 2020-05-14 RX ORDER — CALCITRIOL 0.25 UG/1
0.25 CAPSULE, LIQUID FILLED ORAL DAILY
Status: DISCONTINUED | OUTPATIENT
Start: 2020-05-14 | End: 2020-05-19 | Stop reason: HOSPADM

## 2020-05-14 RX ORDER — HEPARIN SODIUM 1000 [USP'U]/ML
INJECTION, SOLUTION INTRAVENOUS; SUBCUTANEOUS
Status: COMPLETED
Start: 2020-05-14 | End: 2020-05-14

## 2020-05-14 RX ORDER — DEXTROSE MONOHYDRATE 25 G/50ML
50 INJECTION, SOLUTION INTRAVENOUS
Status: DISCONTINUED | OUTPATIENT
Start: 2020-05-14 | End: 2020-05-14

## 2020-05-14 RX ORDER — DEXTROSE MONOHYDRATE 25 G/50ML
50 INJECTION, SOLUTION INTRAVENOUS
Status: DISCONTINUED | OUTPATIENT
Start: 2020-05-14 | End: 2020-05-19 | Stop reason: HOSPADM

## 2020-05-14 RX ORDER — CLOPIDOGREL BISULFATE 75 MG/1
75 TABLET ORAL DAILY
Status: DISCONTINUED | OUTPATIENT
Start: 2020-05-14 | End: 2020-05-19 | Stop reason: HOSPADM

## 2020-05-14 RX ORDER — ATORVASTATIN CALCIUM 40 MG/1
40 TABLET, FILM COATED ORAL
Status: DISCONTINUED | OUTPATIENT
Start: 2020-05-14 | End: 2020-05-19 | Stop reason: HOSPADM

## 2020-05-14 RX ORDER — HEPARIN SODIUM 1000 [USP'U]/ML
1500 INJECTION, SOLUTION INTRAVENOUS; SUBCUTANEOUS
Status: DISCONTINUED | OUTPATIENT
Start: 2020-05-14 | End: 2020-05-19

## 2020-05-14 RX ADMIN — ACETAMINOPHEN 650 MG: 325 TABLET, FILM COATED ORAL at 16:52

## 2020-05-14 RX ADMIN — APIXABAN 2.5 MG: 2.5 TABLET, FILM COATED ORAL at 18:24

## 2020-05-14 RX ADMIN — ATORVASTATIN CALCIUM 40 MG: 40 TABLET, FILM COATED ORAL at 20:25

## 2020-05-14 RX ADMIN — INSULIN GLARGINE 5 UNITS: 100 INJECTION, SOLUTION SUBCUTANEOUS at 18:26

## 2020-05-14 RX ADMIN — CARVEDILOL 3.12 MG: 3.12 TABLET, FILM COATED ORAL at 18:24

## 2020-05-14 RX ADMIN — Medication 1334 MG: at 18:24

## 2020-05-14 RX ADMIN — METHIMAZOLE 5 MG: 5 TABLET ORAL at 18:23

## 2020-05-14 RX ADMIN — CALCITRIOL CAPSULES 0.25 MCG 0.25 MCG: 0.25 CAPSULE ORAL at 12:31

## 2020-05-14 RX ADMIN — VANCOMYCIN HYDROCHLORIDE 1900 MG: 500 INJECTION, POWDER, LYOPHILIZED, FOR SOLUTION INTRAVENOUS at 09:30

## 2020-05-14 RX ADMIN — CLOPIDOGREL BISULFATE 75 MG: 75 TABLET ORAL at 12:31

## 2020-05-14 RX ADMIN — AMPICILLIN SODIUM AND SULBACTAM SODIUM 3 G: 2; 1 INJECTION, POWDER, FOR SOLUTION INTRAMUSCULAR; INTRAVENOUS at 09:19

## 2020-05-14 RX ADMIN — HEPARIN SODIUM 1500 UNITS: 1000 INJECTION, SOLUTION INTRAVENOUS; SUBCUTANEOUS at 14:59

## 2020-05-14 RX ADMIN — OMEPRAZOLE 20 MG: 20 CAPSULE, DELAYED RELEASE ORAL at 20:24

## 2020-05-14 RX ADMIN — INSULIN LISPRO 3 UNITS: 100 INJECTION, SOLUTION INTRAVENOUS; SUBCUTANEOUS at 20:32

## 2020-05-14 RX ADMIN — CEFTRIAXONE SODIUM 2 G: 2 INJECTION, POWDER, FOR SOLUTION INTRAMUSCULAR; INTRAVENOUS at 18:32

## 2020-05-14 RX ADMIN — HEPARIN SODIUM 3700 UNITS: 1000 INJECTION, SOLUTION INTRAVENOUS; SUBCUTANEOUS at 17:48

## 2020-05-14 RX ADMIN — METHIMAZOLE 7.5 MG: 5 TABLET ORAL at 13:28

## 2020-05-14 ASSESSMENT — COGNITIVE AND FUNCTIONAL STATUS - GENERAL
STANDING UP FROM CHAIR USING ARMS: A LITTLE
DRESSING REGULAR LOWER BODY CLOTHING: A LITTLE
SUGGESTED CMS G CODE MODIFIER DAILY ACTIVITY: CI
TURNING FROM BACK TO SIDE WHILE IN FLAT BAD: A LITTLE
MOVING FROM LYING ON BACK TO SITTING ON SIDE OF FLAT BED: A LITTLE
MOBILITY SCORE: 20
DAILY ACTIVITIY SCORE: 23
WALKING IN HOSPITAL ROOM: A LITTLE
SUGGESTED CMS G CODE MODIFIER MOBILITY: CJ

## 2020-05-14 ASSESSMENT — ENCOUNTER SYMPTOMS
WEIGHT LOSS: 0
EYE PAIN: 0
PALPITATIONS: 0
CLAUDICATION: 0
BRUISES/BLEEDS EASILY: 0
SENSORY CHANGE: 1
HEMOPTYSIS: 0
SINUS PAIN: 0
NAUSEA: 0
VOMITING: 0
FEVER: 1
POLYDIPSIA: 0
DIAPHORESIS: 0
DOUBLE VISION: 0
FALLS: 0
HALLUCINATIONS: 0
TINGLING: 0
FLANK PAIN: 0
EYE REDNESS: 0
DEPRESSION: 0
SORE THROAT: 0
COUGH: 0
BLURRED VISION: 0
SHORTNESS OF BREATH: 0
FOCAL WEAKNESS: 0
DIARRHEA: 0
CONSTIPATION: 0
SPUTUM PRODUCTION: 0
MYALGIAS: 0
CHILLS: 1
ABDOMINAL PAIN: 0
BLOOD IN STOOL: 0
DIZZINESS: 0
SPEECH CHANGE: 0
HEADACHES: 0
NERVOUS/ANXIOUS: 0
NAUSEA: 1

## 2020-05-14 ASSESSMENT — FIBROSIS 4 INDEX
FIB4 SCORE: 2.29
FIB4 SCORE: 1.66

## 2020-05-14 ASSESSMENT — LIFESTYLE VARIABLES
EVER_SMOKED: YES
EVER FELT BAD OR GUILTY ABOUT YOUR DRINKING: NO
HAVE PEOPLE ANNOYED YOU BY CRITICIZING YOUR DRINKING: NO
DOES PATIENT WANT TO STOP DRINKING: NO
HAVE YOU EVER FELT YOU SHOULD CUT DOWN ON YOUR DRINKING: NO
TOTAL SCORE: 0
SUBSTANCE_ABUSE: 0
CONSUMPTION TOTAL: NEGATIVE
ALCOHOL_USE: YES
TOTAL SCORE: 0
ON A TYPICAL DAY WHEN YOU DRINK ALCOHOL HOW MANY DRINKS DO YOU HAVE: 1
AVERAGE NUMBER OF DAYS PER WEEK YOU HAVE A DRINK CONTAINING ALCOHOL: 1
EVER HAD A DRINK FIRST THING IN THE MORNING TO STEADY YOUR NERVES TO GET RID OF A HANGOVER: NO
HOW MANY TIMES IN THE PAST YEAR HAVE YOU HAD 5 OR MORE DRINKS IN A DAY: 0
TOTAL SCORE: 0

## 2020-05-14 ASSESSMENT — CHA2DS2 SCORE
CHA2DS2 VASC SCORE: 5
VASCULAR DISEASE: YES
SEX: MALE
AGE 65 TO 74: NO
HYPERTENSION: YES
DIABETES: YES
CHF OR LEFT VENTRICULAR DYSFUNCTION: NO
PRIOR STROKE OR TIA OR THROMBOEMBOLISM: NO
AGE 75 OR GREATER: YES

## 2020-05-14 NOTE — ED PROVIDER NOTES
"ED Provider Note    CHIEF COMPLAINT  Chief Complaint   Patient presents with   • Fever       HPI  Luis Sepulveda is a 74 y.o. male who presents to the emergency department with a fever.  Patient has a history of end-stage renal disease.  He has a history of a left foot infection.  As well as arterial occlusion.  He has a chronic ulcer over the left great toe.  This is mild to moderately painful.  He thinks it is getting better, but it is persistently swollen red.  Mild throbbing nonradiating pain associated with his chronic lower extremity neuropathy.  Last night he developed a fever up to 101.  He was due for dialysis today.  He called the dialysis center and they told him that he should come to the hospital because he has a fever.    REVIEW OF SYSTEMS  As per HPI, otherwise a 10 point review of systems is negative    PAST MEDICAL HISTORY  Past Medical History:   Diagnosis Date   • CAD (coronary artery disease)     stents   • Chronic kidney disease (CKD), stage V (HCC)    • Diabetes    • Hypertension    • Osteoarthritis    • Peripheral neuropathy        SOCIAL HISTORY  Social History     Tobacco Use   • Smoking status: Former Smoker     Packs/day: 1.00     Years: 55.00     Pack years: 55.00     Types: Cigarettes     Last attempt to quit: 2014     Years since quittin.3   • Smokeless tobacco: Never Used   Substance Use Topics   • Alcohol use: No   • Drug use: No       SURGICAL HISTORY  Past Surgical History:   Procedure Laterality Date   • APPENDECTOMY     • OTHER CARDIAC SURGERY      stents x1   • OTHER ORTHOPEDIC SURGERY      knee surgery       CURRENT MEDICATIONS  Home Medications    **Home medications have not yet been reviewed for this encounter**         ALLERGIES  Allergies   Allergen Reactions   • Mircera [Methoxy Polyethylene Glycol-Epoetin Beta] Hives       PHYSICAL EXAM  VITAL SIGNS: BP (!) 163/73   Pulse 99   Temp 37.2 °C (99 °F) (Tympanic)   Resp 18   Ht 1.676 m (5' 6\")   Wt 74.8 kg " (165 lb)   SpO2 99%   BMI 26.63 kg/m²    Constitutional: Awake and alert  HENT:  Atraumatic, Normocephalic.Oropharynx dry mucus membranes, Nose normal inspection.   Eyes: Normal inspection  Neck: Supple  Cardiovascular: Normal heart rate, Normal rhythm.  Symmetric peripheral pulses.   Thorax & Lungs: No respiratory distress, No wheezing, No rales, No rhonchi, No chest tenderness.   Abdomen: Bowel sounds normal, soft, non-distended, nontender, no mass  Skin: Necrotic ulcer at the end of the left great toe.  No draining exudate.  There is mild redness of the little dorsal left toe.  There is edema of the left foot with mild redness and warmth.  Back: No tenderness, No CVA tenderness.   Extremities: No clubbing, cyanosis, edema, no Homans or cords   Neurologic: Grossly normal   Psychiatric: Anxious appearing    RADIOLOGY/PROCEDURES  DX-FOOT-COMPLETE 3+ LEFT   Final Result         1. No fracture or dislocation. No cortical destruction of the great toe to suggest osteomyelitis.   2. Mild to moderate multifocal osteoarthrosis.      DX-CHEST-PORTABLE (1 VIEW)   Final Result      Chronic scarring in the left lung base. No new consolidation of pleural effusions.           Imaging is interpreted by radiologist    Labs:  Results for orders placed or performed during the hospital encounter of 05/14/20   LACTIC ACID   Result Value Ref Range    Lactic Acid 2.0 0.5 - 2.0 mmol/L   CBC WITH DIFFERENTIAL   Result Value Ref Range    WBC 9.7 4.8 - 10.8 K/uL    RBC 3.65 (L) 4.70 - 6.10 M/uL    Hemoglobin 12.0 (L) 14.0 - 18.0 g/dL    Hematocrit 34.1 (L) 42.0 - 52.0 %    MCV 93.4 81.4 - 97.8 fL    MCH 32.9 27.0 - 33.0 pg    MCHC 35.2 33.7 - 35.3 g/dL    RDW 47.7 35.9 - 50.0 fL    Platelet Count 165 164 - 446 K/uL    MPV 8.8 (L) 9.0 - 12.9 fL    Neutrophils-Polys 86.30 (H) 44.00 - 72.00 %    Lymphocytes 7.30 (L) 22.00 - 41.00 %    Monocytes 5.50 0.00 - 13.40 %    Eosinophils 0.20 0.00 - 6.90 %    Basophils 0.30 0.00 - 1.80 %    Immature  Granulocytes 0.40 0.00 - 0.90 %    Nucleated RBC 0.00 /100 WBC    Neutrophils (Absolute) 8.35 (H) 1.82 - 7.42 K/uL    Lymphs (Absolute) 0.71 (L) 1.00 - 4.80 K/uL    Monos (Absolute) 0.53 0.00 - 0.85 K/uL    Eos (Absolute) 0.02 0.00 - 0.51 K/uL    Baso (Absolute) 0.03 0.00 - 0.12 K/uL    Immature Granulocytes (abs) 0.04 0.00 - 0.11 K/uL    NRBC (Absolute) 0.00 K/uL   COMP METABOLIC PANEL   Result Value Ref Range    Sodium 130 (L) 135 - 145 mmol/L    Potassium 4.7 3.6 - 5.5 mmol/L    Chloride 91 (L) 96 - 112 mmol/L    Co2 22 20 - 33 mmol/L    Anion Gap 17.0 (H) 7.0 - 16.0    Glucose 194 (H) 65 - 99 mg/dL    Bun 44 (H) 8 - 22 mg/dL    Creatinine 6.05 (HH) 0.50 - 1.40 mg/dL    Calcium 9.5 8.5 - 10.5 mg/dL    AST(SGOT) 13 12 - 45 U/L    ALT(SGPT) 13 2 - 50 U/L    Alkaline Phosphatase 147 (H) 30 - 99 U/L    Total Bilirubin 0.8 0.1 - 1.5 mg/dL    Albumin 4.0 3.2 - 4.9 g/dL    Total Protein 7.3 6.0 - 8.2 g/dL    Globulin 3.3 1.9 - 3.5 g/dL    A-G Ratio 1.2 g/dL   Sed Rate   Result Value Ref Range    Sed Rate Westergren 58 (H) 0 - 20 mm/hour   COVID/SARS CoV-2    Specimen: Nasopharyngeal; Respirate   Result Value Ref Range    COVID Order Status Received    CRP QUANTITIVE (NON-CARDIAC)   Result Value Ref Range    Stat C-Reactive Protein 2.91 (H) 0.00 - 0.75 mg/dL   SARS-CoV-2, PCR (In-House)   Result Value Ref Range    SARS-CoV-2 Source NP Swab     SARS-CoV-2 by PCR NotDetected NotDetected   ESTIMATED GFR   Result Value Ref Range    GFR If  11 (A) >60 mL/min/1.73 m 2    GFR If Non African American 9 (A) >60 mL/min/1.73 m 2   EKG   Result Value Ref Range    Report       [facility] Emergency Dept.    Test Date:  2020  Pt Name:    KLARISSA BRADSHAW              Department: ER  MRN:        [id number]                      Room:       [facility]  Gender:     Male                         Technician: [id number]  :        1945                   Requested By:ÁNGEL SMALL  Order #:    [id number]                     Reading MD: ÁNGEL SMALL MD    Measurements  Intervals                                Axis  Rate:       90                           P:          53  TX:         192                          QRS:        -73  QRSD:       136                          T:          85  QT:         392  QTc:        480    Interpretive Statements  SINUS RHYTHM  VENTRICULAR TRIGEMINY  RBBB AND LAFB  Compared to ECG 04/21/2020 21:14:24  Ventricular premature complex(es) now present  Electronically Signed On 5- 10:35:34 PDT by ÁNGEL SMALL MD     She was tachycardic at one point and I obtained an EKG because of this.    Medications   vancomycin (VANCOCIN) 1,900 mg in  mL IVPB (1,900 mg Intravenous New Bag 5/14/20 0930)   ampicillin/sulbactam (UNASYN) 3 g in  mL IVPB (0 g Intravenous Stopped 5/14/20 0929)       COURSE & MEDICAL DECISION MAKING  She presents with what appears to be a mild cellulitis of the foot related to an car at the distal end of the right great toe.  I suspect this is the source of the fever that he reports however is not entirely clear.  Sepsis work-up was initiated.  Ruled out COVID by swab.  Data returned as above.  He was given vancomycin and Zosyn while in the ER.  He does have an elevated inflammatory markers.  I worry about underlying osteomyelitis.  The patient will also need dialysis today.  He will need to be admitted to the hospital.  I consulted the [place].    FINAL IMPRESSION  1.  Diabetic foot infection, right great toe  2.  Rule out osteomyelitis, right great toe  3.  End-stage renal disease requiring dialysis      This dictation was created using voice recognition software. The accuracy of the dictation is limited to the abilities of the software.  The nursing notes were reviewed and certain aspects of this information were incorporated into this note.      Electronically signed by: Ángel Small M.D., 5/14/2020 9:04 AM

## 2020-05-14 NOTE — ED NOTES
Pt arrived via POV for fever x2 days. Pt ambulated to room  With no difficulty, pt caox4 speaking in full sentences no distress, no sob, no cp, no abd pain. Pt states he has been having a fever for past 2 days and highest temp was 101.4. pt went to dialysis today and was sent here for evaluation due to fever

## 2020-05-14 NOTE — PROGRESS NOTES
"Pharmacy Kinetics 74 y.o. male on vancomycin day # 1   2020    New start Vancomycin 1900 mg iv x 1 load in ED at 0930  Provider specified end date: TBD    Indication for Treatment: cellulitis of the left foot with ulcer of great toe    Pertinent history per medical record: Admitted on 2020 for ulcer of left great toe with superimposed cellulitis who had recent admission (-20) for left great toe ulcer noted to be febrile the night prior and day of admission. Pt was told to come to the ED instead of dialysis due to fever. Pt started empirically on vancomycin and wound consult ordered. Blood cultures obtained and wound and urine cultures ordered. PMH: ESRD on iHD, paroxysmal afib, IDDM, CAD, hypothyroidism, and GERD.     Other antibiotics: none    Allergies: Mircera [methoxy polyethylene glycol-epoetin beta]     List concerns for renal function: ESRD (/Thu/Sat)    Pertinent cultures to date:   20 Peripheral blood culture: in process  20 wound/tissue culture: to be collected   20 urine culture: to be collected  20 COVID: negative     MRSA nares swab if pneumonia is a concern (ordered/positive/negative/n-a): N/A    Recent Labs     20  0852   WBC 9.7   NEUTSPOLYS 86.30*     Recent Labs     20  0852   BUN 44*   CREATININE 6.05*   ALBUMIN 4.0     No results for input(s): VANCOTROUGH, VANCOPEAK, VANCORANDOM in the last 72 hours.No intake or output data in the 24 hours ending 20 1445   /90   Pulse 96   Temp 37.6 °C (99.6 °F) (Temporal)   Resp 18   Ht 1.676 m (5' 6\")   Wt 76.3 kg (168 lb 3.4 oz)   SpO2 98%  Temp (24hrs), Av.4 °C (99.3 °F), Min:37.2 °C (99 °F), Max:37.6 °C (99.6 °F)      A/P   1. Vancomycin dose change: pulse dosing   2. Next vancomycin level: 20 with AM labs (ordered)   3. Goal trough: 12-16 mcg/mL   4. Comments: significant concern for vancomycin accumulation as pt is currently on iHD. Nephrology consult has been placed, awaiting " dialysis session due today. Plan for random level with AM labs on Sunday 5/17.  Pharmacy will continue to monitor and adjust dosing as needed.      Bobbi Guerra, PharmD, BCOP

## 2020-05-14 NOTE — H&P
History & Physical Note    Date of Admission: 5/14/2020  Admission Status: Inpatient  [facility] Team: [facility] IM Yellow Team  Attending: Vicky Middleton M.D.   Senior Resident: Dr. Mendez  Intern: Dr. Sanchez  Contact Number: [phone]    Chief Complaint: Cellulitis of the left foot with ulcer of the great toe    History of Present Illness (HPI):  Luis is a 74 y.o. male who presented 5/14/2020 with PMHx ESRD (Tuesday, Thursday, Saturday), CAD s/p stents in proximal and mid LAD, IDDM, A. fib (currently on apixaban), hypothyroidism and GERD who presents to the [facility] ED with 2 episodes of fever and worsening pain of the left foot most significantly at the great toe.  Patient states that he noted a fever of 101.4F yesterday evening.  He went to sleep and retook his temperature this morning and continued to have a fever of 101.4F and noted worsening pain of his left great toe and associated swelling and redness.  He called the dialysis center and they recommended he come to the hospital for further evaluation.  He denies any other symptoms including drainage at the left great toe, diaphoresis, nausea, vomiting, diarrhea, dysuria.  He has no symptoms at his tunneled dialysis catheter and denies any pain, redness, swelling, or drainage around that site.  He was recently admitted at [facility] for this great toe ulcer.  At that time foot x-ray was negative for osteomyelitis although an arterial duplex of the left leg noted occlusion of the left posterior tibial artery and stenosis in the left proximal anterior tibial artery.  Vascular surgery performed a left extremity angiography and performed angioplasty and thrombectomy at that time.  He followed up with vascular surgery last Friday and they said everything was healing well and had no additional recommendations at that time.    In ED: Vital signs medically stable hemodynamically stable, although patient has hypertensive 160s/60s.  WBC 9.7, hemoglobin 12, platelet count 165,  lactic acid 2, creatinine 6.05 (currently on HD), anion gap 17, sodium 130, potassium 4.7, CRP 2.91, ESR 58, SARS-COV-2 neg. x-ray of the foot noting no fracture dislocation no cortical destruction of the great toe.  CXR noting chronic scarring in the left lung base.    Review of Systems:   Review of Systems   Constitutional: Positive for chills and fever. Negative for diaphoresis, malaise/fatigue and weight loss.   HENT: Negative for congestion, sinus pain and sore throat.    Eyes: Negative for blurred vision and double vision.   Respiratory: Negative for cough, hemoptysis, sputum production and shortness of breath.    Cardiovascular: Positive for leg swelling (left leg). Negative for chest pain, palpitations and claudication.   Gastrointestinal: Positive for nausea (chronic). Negative for abdominal pain, blood in stool, constipation, diarrhea and vomiting.   Genitourinary: Negative for dysuria, flank pain and hematuria.   Musculoskeletal: Negative for falls and myalgias.   Neurological: Positive for sensory change (Loss of sensation Bilateral feet (chronic)). Negative for dizziness, tingling, speech change, focal weakness and headaches.   Endo/Heme/Allergies: Does not bruise/bleed easily.   Psychiatric/Behavioral: Negative for depression and substance abuse. The patient is not nervous/anxious.          Past Medical History:   Past Medical History was reviewed with patient.   has a past medical history of CAD (coronary artery disease), Chronic kidney disease (CKD), stage V (HCC), Diabetes, Hypertension, Osteoarthritis, and Peripheral neuropathy.    Past Surgical History: Past Surgical History was reviewed with patient.   has a past surgical history that includes appendectomy; other orthopedic surgery; and other cardiac surgery.    Medications: Medications have been reviewed with patient.  Prior to Admission Medications   Prescriptions Last Dose Informant Patient Reported? Taking?   apixaban (ELIQUIS) 2.5mg Tab  5/13/2020 at PM Patient Yes No   Sig: Take 2.5 mg by mouth 2 Times a Day.   atorvastatin (LIPITOR) 40 MG Tab 5/13/2020 at PM Patient No No   Sig: Take 1 Tab by mouth every bedtime.   calcitRIOL (ROCALTROL) 0.25 MCG Cap 5/13/2020 at AM Patient Yes No   Sig: Take 0.25 mcg by mouth every day.   calcium acetate (PHOS-LO) 667 MG Cap 5/13/2020 at PM Patient Yes No   Sig: Take 1,334 mg by mouth 3 times a day, with meals.   carvedilol (COREG) 3.125 MG Tab 5/13/2020 at PM Patient Yes No   Sig: Take 3.125 mg by mouth 2 times a day, with meals.   clopidogrel (PLAVIX) 75 MG Tab 5/13/2020 at PM Patient No No   Sig: Take 1 Tab by mouth every day.   insulin glargine (LANTUS SOLOSTAR) 100 UNIT/ML Solution Pen-injector injection 5/13/2020 at PM Patient Yes No   Sig: Inject 5 Units as instructed every bedtime.   methimazole (TAPAZOLE) 5 MG Tab 5/13/2020 at PM Patient No No   Sig: Take 1.5 tabs in am, and 1 tab in evening   pantoprazole (PROTONIX) 40 MG Tablet Delayed Response 5/13/2020 at PM Patient Yes No   Sig: Take 40 mg by mouth every bedtime.      Facility-Administered Medications: None        Allergies: Allergies have been reviewed with patient.  Allergies   Allergen Reactions   • Mircera [Methoxy Polyethylene Glycol-Epoetin Beta] Hives       Family History:   family history includes Alcohol/Drug in his father; Diabetes in his brother; Heart Disease in his mother; Thyroid in his son.     Social History:   Tobacco: None  Alcohol: None  Recreational drugs (illegal and prescription): None  Employment: Retired  Activity Level: Moderate  Living situation: Stable  Recent travel: None  Primary Care Provider: reviewed Patito Edgar M.D.  Other (stressors, spirituality, exposures):  None  Physical Exam:   Vitals:  Temp:  [37.2 °C (99 °F)] 37.2 °C (99 °F)  Pulse:  [] 89  Resp:  [18-39] 39  BP: (117-163)/(60-90) 133/60  SpO2:  [96 %-99 %] 96 %    Physical Exam  Vitals signs and nursing note reviewed.   Constitutional:        General: He is not in acute distress.     Appearance: He is not diaphoretic.   HENT:      Head: Normocephalic and atraumatic.      Right Ear: External ear normal.      Left Ear: External ear normal.      Nose: Nose normal. No congestion.      Mouth/Throat:      Mouth: Mucous membranes are moist.      Pharynx: Oropharynx is clear. No oropharyngeal exudate.   Eyes:      Extraocular Movements: Extraocular movements intact.      Conjunctiva/sclera: Conjunctivae normal.      Pupils: Pupils are equal, round, and reactive to light.   Neck:      Musculoskeletal: Normal range of motion and neck supple. No neck rigidity.   Cardiovascular:      Rate and Rhythm: Normal rate. Rhythm irregular.      Pulses: Normal pulses.      Heart sounds: No murmur.   Pulmonary:      Effort: Pulmonary effort is normal. No respiratory distress.      Breath sounds: Normal breath sounds. No wheezing.   Abdominal:      General: Abdomen is flat. Bowel sounds are normal. There is no distension.      Palpations: Abdomen is soft.      Tenderness: There is no abdominal tenderness. There is no guarding.   Musculoskeletal: Normal range of motion.      Right lower leg: No edema.      Comments: Left great toe with roughly 2 cm ulcer, edema up to the level of the ankle and mild erythema. Pain to palpation of the toe. No exposed bone.    Skin:     General: Skin is warm and dry.   Neurological:      General: No focal deficit present.      Mental Status: He is oriented to person, place, and time.      Cranial Nerves: No cranial nerve deficit.      Sensory: Sensory deficit (Bilateral loss on bottom of feet) present.      Motor: No weakness.   Psychiatric:         Mood and Affect: Mood normal.         Behavior: Behavior normal.         Thought Content: Thought content normal.         Labs:   Results for orders placed or performed during the hospital encounter of 05/14/20   LACTIC ACID   Result Value Ref Range    Lactic Acid 2.0 0.5 - 2.0 mmol/L   LACTIC ACID    Result Value Ref Range    Lactic Acid 1.6 0.5 - 2.0 mmol/L   CBC WITH DIFFERENTIAL   Result Value Ref Range    WBC 9.7 4.8 - 10.8 K/uL    RBC 3.65 (L) 4.70 - 6.10 M/uL    Hemoglobin 12.0 (L) 14.0 - 18.0 g/dL    Hematocrit 34.1 (L) 42.0 - 52.0 %    MCV 93.4 81.4 - 97.8 fL    MCH 32.9 27.0 - 33.0 pg    MCHC 35.2 33.7 - 35.3 g/dL    RDW 47.7 35.9 - 50.0 fL    Platelet Count 165 164 - 446 K/uL    MPV 8.8 (L) 9.0 - 12.9 fL    Neutrophils-Polys 86.30 (H) 44.00 - 72.00 %    Lymphocytes 7.30 (L) 22.00 - 41.00 %    Monocytes 5.50 0.00 - 13.40 %    Eosinophils 0.20 0.00 - 6.90 %    Basophils 0.30 0.00 - 1.80 %    Immature Granulocytes 0.40 0.00 - 0.90 %    Nucleated RBC 0.00 /100 WBC    Neutrophils (Absolute) 8.35 (H) 1.82 - 7.42 K/uL    Lymphs (Absolute) 0.71 (L) 1.00 - 4.80 K/uL    Monos (Absolute) 0.53 0.00 - 0.85 K/uL    Eos (Absolute) 0.02 0.00 - 0.51 K/uL    Baso (Absolute) 0.03 0.00 - 0.12 K/uL    Immature Granulocytes (abs) 0.04 0.00 - 0.11 K/uL    NRBC (Absolute) 0.00 K/uL   COMP METABOLIC PANEL   Result Value Ref Range    Sodium 130 (L) 135 - 145 mmol/L    Potassium 4.7 3.6 - 5.5 mmol/L    Chloride 91 (L) 96 - 112 mmol/L    Co2 22 20 - 33 mmol/L    Anion Gap 17.0 (H) 7.0 - 16.0    Glucose 194 (H) 65 - 99 mg/dL    Bun 44 (H) 8 - 22 mg/dL    Creatinine 6.05 (HH) 0.50 - 1.40 mg/dL    Calcium 9.5 8.5 - 10.5 mg/dL    AST(SGOT) 13 12 - 45 U/L    ALT(SGPT) 13 2 - 50 U/L    Alkaline Phosphatase 147 (H) 30 - 99 U/L    Total Bilirubin 0.8 0.1 - 1.5 mg/dL    Albumin 4.0 3.2 - 4.9 g/dL    Total Protein 7.3 6.0 - 8.2 g/dL    Globulin 3.3 1.9 - 3.5 g/dL    A-G Ratio 1.2 g/dL   Sed Rate   Result Value Ref Range    Sed Rate [facility] 58 (H) 0 - 20 mm/hour   COVID/SARS CoV-2    Specimen: Nasopharyngeal; Respirate   Result Value Ref Range    COVID Order Status Received    CRP QUANTITIVE (NON-CARDIAC)   Result Value Ref Range    Stat C-Reactive Protein 2.91 (H) 0.00 - 0.75 mg/dL   SARS-CoV-2, PCR (In-House)   Result Value Ref Range     SARS-CoV-2 Source NP Swab     SARS-CoV-2 by PCR NotDetected NotDetected   ESTIMATED GFR   Result Value Ref Range    GFR If  11 (A) >60 mL/min/1.73 m 2    GFR If Non African American 9 (A) >60 mL/min/1.73 m 2   EKG   Result Value Ref Range    Report       [facility]    Test Date:  2020  Pt Name:    KLARISSA BRADSHAW              Department: ER  MRN:        [id number]                      Room:       [facility]  Gender:     Male                         Technician: [id number]  :        1945                   Requested By:ÁNGEL SMALL  Order #:    [id number]                    Reading MD: ÁNGEL SMALL MD    Measurements  Intervals                                Axis  Rate:       90                           P:          53  NV:         192                          QRS:        -73  QRSD:       136                          T:          85  QT:         392  QTc:        480    Interpretive Statements  SINUS RHYTHM  VENTRICULAR TRIGEMINY  RBBB AND LAFB  Compared to ECG 2020 21:14:24  Ventricular premature complex(es) now present  Electronically Signed On 2020 10:35:34 PDT by ÁNGEL SMALL MD           EKG: Per my read, NSR with no significant ST segment changes.    Imaging:   DX-FOOT-COMPLETE 3+ LEFT   Final Result         1. No fracture or dislocation. No cortical destruction of the great toe to suggest osteomyelitis.   2. Mild to moderate multifocal osteoarthrosis.      DX-CHEST-PORTABLE (1 VIEW)   Final Result      Chronic scarring in the left lung base. No new consolidation of pleural effusions.      MR-FOOT-W/O LEFT    (Results Pending)         Previous Data Review: reviewed    * Ulcer of left great toe with superimposed cellulitis- (present on admission)  Assessment & Plan  -2 episodes of fevers and worsening pain of the left foot most significant at the left great toe.  - WBC 9.7, CRP 2.91, ESR 58  - XR foot (2020): No fracture or  dislocation.  No cortical destruction of the great toe.  -Vancomycin started in ED  Plan:  - Continue vancomycin and start ceftriaxone for gram negative coverage. No previous cultures with pseudomonas.  - MRI of the left foot to evaluate for osteomyelitis this patient has had repeated infections involving the left ulcer of the great toe  - LPS to evaluate patient  - BC pending    Peripheral vascular disease ([facility])- (present on admission)  Assessment & Plan  - On admission in April 2020 patient had vascular intervention with a left extremity angiography and angioplasty and thrombectomy of the left posterior tibial artery and left proximal anterior tibial artery  -Evaluated by vascular on 5/8/2020 no additional recommendations at that time healing well  Plan:  - Continue home apixaban and clopidogrel    Paroxysmal A-fib ([facility])- (present on admission)  Assessment & Plan  - Continue home axipaban    End stage renal disease on dialysis ([facility])- (present on admission)  Assessment & Plan  -Patient on HD Tuesday, Thursday, Saturday  Plan:  - Consult to nephrology for inpatient dialysis    Diabetes mellitus type 2 in nonobese ([facility])- (present on admission)  Assessment & Plan  -Continue home regiment of glargine 5 units q. Evening  -Humalog correctional scale  -Hypoglycemia protocol    Secondary hyperparathyroidism of renal origin ([facility])- (present on admission)  Assessment & Plan  - Continue home medications of calcitriol & calcium acetate    Hyperthyroidism- (present on admission)  Assessment & Plan  -Continue home methimazole dosing

## 2020-05-14 NOTE — SENIOR ADMIT NOTE
[facility] [place] SENIOR ADMIT NOTE:    Patient ID:   Name:             Luis Sepulveda   YOB: 1945  Age:                 74 y.o.  male   MRN:               [id number]                                                          Chief Complaint:       Fever     History of Present Illness:     Mr. Sepulveda is a 74 y.o. male with a past medical history of ESRD on hemodialysis, coronary artery disease s/p stent placement, IDDM, atrial fibrillation on apixaban, hyperthyroidism and gastroesophageal reflux disease presented to the emergency department for evaluation of fever, per patient he was feeling fine until last night when he felt hot and sweaty, he checked his temperature and his temperature was 101, patient called dialysis center today and he was instructed not to go for dialysis and get evaluated in the hospital, he was recently admitted 3 weeks ago with pain over the left big toe associated with ulcer, he had angiogram done and underwent left anterior tibial and peroneal artery angioplasty with percutaneous thrombectomy, his x-ray at that time did not show any sign of chronic osteomyelitis, today patient reporting tenderness over his left big toe.  No chills.  Denies skin redness and tenderness around his dialysis catheter.  Lab work in the ED showed within normal WBC, sed rate 58, CRP 2.91, x-ray of left foot did not show any evidence of osteomyelitis, chest x-ray no new consolidation.      Active Ambulatory Problems     Diagnosis Date Noted   • Polyarthritis 06/25/2018   • Hyperthyroidism 06/25/2018   • CAD (coronary artery disease) 06/25/2018   • Hepatic cyst 08/02/2018   • Renal cyst 08/02/2018   • Essential hypertension 08/15/2018   • Gastroesophageal reflux disease 08/15/2018   • Anemia of chronic disease 08/15/2018   • Health care maintenance 08/15/2018   • Dyslipidemia 04/11/2019   • Thyroid nodule 04/11/2019   • Diabetes mellitus type 2 in nonobese (HCC) 08/13/2019   • End stage  "renal disease on dialysis ([facility]) 08/13/2019   • NSTEMI (non-ST elevated myocardial infarction) ([facility]) 11/11/2019   • Secondary hyperparathyroidism of renal origin ([facility]) 11/15/2019   • Paroxysmal A-fib ([facility]) 11/16/2019   • Acute metabolic encephalopathy 11/22/2019   • Elevated troponin 11/28/2019   • Hypotension due to hypovolemia 12/02/2019   • Dysphagia 02/18/2020   • Diastolic dysfunction 03/26/2020   • Presence of stent in LAD coronary artery 03/26/2020   • Chronic anticoagulation 03/26/2020   • Ulcer of left foot 04/21/2020   • Peripheral vascular disease ([facility]) 04/23/2020     Resolved Ambulatory Problems     Diagnosis Date Noted   • Pulmonary edema 01/24/2014   • COPD exacerbation ([facility]) 01/24/2014   • Type 2 diabetes mellitus, with long-term current use of insulin ([facility]) 06/25/2018   • Hypovolemic hyponatremia 06/25/2018   • Acute renal failure superimposed on stage 4 chronic kidney disease ([facility]) 06/25/2018   • HTN (hypertension) 06/25/2018   • Hypotension 11/28/2019   • Elevated lactic acid level 11/28/2019   • Pneumonia 11/28/2019   • Palpitations 12/10/2019   • Shortness of breath 02/17/2020     Past Medical History:   Diagnosis Date   • Chronic kidney disease (CKD), stage V ([facility])    • Diabetes    • Hypertension    • Osteoarthritis    • Peripheral neuropathy        PHYSICAL EXAM  Vitals:   Weight/BMI: Body mass index is 26.63 kg/m².  Blood Pressure 133/60   Pulse 89   Temperature 37.2 °C (99 °F) (Tympanic)   Respiration (Abnormal) 39   Height 1.676 m (5' 6\")   Weight 74.8 kg (165 lb)   Oxygen Saturation 96%   Vitals:    05/14/20 0843 05/14/20 0902 05/14/20 0931 05/14/20 1031   BP: (Abnormal) 163/73 140/90 117/72 133/60   Pulse: 99 (Abnormal) 133 91 89   Resp: 18 (Abnormal) 39     Temp: 37.2 °C (99 °F)      TempSrc: Tympanic      SpO2: 99% 98% 98% 96%   Weight: 74.8 kg (165 lb)      Height: 1.676 m (5' 6\")        Oxygen Therapy:  Pulse Oximetry: 96 %    Physical Exam   Constitutional: He is oriented to " person, place, and time. No distress.   HENT:   Head: Normocephalic and atraumatic.   Neck: Normal range of motion.   Cardiovascular: Normal rate. An irregularly irregular rhythm present.   Pulmonary/Chest: Effort normal. No respiratory distress. He has no wheezes. He has no rales.   Abdominal: Soft. He exhibits no distension.   Musculoskeletal:         General: No edema.      Comments: Left leg warmer than the right, dry eschar over the tip of the left big toe, tenderness of the left big toe.   Neurological: He is alert and oriented to person, place, and time.   Skin: He is not diaphoretic.           Imaging:  DX-FOOT-COMPLETE 3+ LEFT   Final Result         1. No fracture or dislocation. No cortical destruction of the great toe to suggest osteomyelitis.   2. Mild to moderate multifocal osteoarthrosis.      DX-CHEST-PORTABLE (1 VIEW)   Final Result      Chronic scarring in the left lung base. No new consolidation of pleural effusions.              ASSESSMENT:  1) fever of unknown origin  2) ESRD on hemodialysis  3) IDDM  4) atrial fibrillation  4) PAD s/p angiogram and thrombectomy.    74 years old male with multiple chronic medical conditions presented to the emergency department because of fever, missed his dialysis today, chest x-ray not impressive, most likely his source of fever/infection coming from his a chronic left big toe ulcer.    PLAN:  -Consult nephrology for hemodialysis today.  -Received IV vancomycin in the ED, will continue for now.  Blood cultures pending.  -Wound care consult.  -We will plan MRI to rule out osteomyelitis.  -Resume home meds.            Emerita Pineda M.D.

## 2020-05-14 NOTE — ASSESSMENT & PLAN NOTE
-Continue home regiment of glargine 5 units q. Evening  -Humalog correctional scale  -Hypoglycemia protocol

## 2020-05-14 NOTE — CARE PLAN
Problem: Venous Thromboembolism (VTW)/Deep Vein Thrombosis (DVT) Prevention:  Goal: Patient will participate in Venous Thrombosis (VTE)/Deep Vein Thrombosis (DVT)Prevention Measures  Outcome: PROGRESSING AS EXPECTED  Note: No s/s of DVT. Pt on eliquis.      Problem: Bowel/Gastric:  Goal: Normal bowel function is maintained or improved  Outcome: PROGRESSING AS EXPECTED   Note: Pt stated had bm yesterday and denied any problems.

## 2020-05-14 NOTE — ASSESSMENT & PLAN NOTE
-Patient on HD Tuesday, Thursday, Saturday  Plan:  - nephrology on board.    -1.5L fluid restriction

## 2020-05-14 NOTE — WOUND TEAM
Pt known to LPS.  Consulted LPS for great toe wound with dry gangrene.  Wound team signing off for now.

## 2020-05-14 NOTE — ASSESSMENT & PLAN NOTE
- On admission in April 2020 patient had vascular intervention with a left extremity angiography and angioplasty and thrombectomy of the left posterior tibial artery and left proximal anterior tibial artery  -Evaluated by vascular on 5/8/2020 no additional recommendations at that time healing well  Plan:  - Continue home apixaban and clopidogrel

## 2020-05-14 NOTE — ASSESSMENT & PLAN NOTE
- Source if Ulcer of left great toe  - XR foot (5/14/2020): No fracture or dislocation.  No cortical destruction of the great toe.  - MRI (5/15/20): abnormal signal in the distal aspect of the first distal phalanx consistent with osteomyelitis  - BC NGTD  Plan:  - Continue vancomycin and ceftriaxone for gram negative coverage. No previous cultures with pseudomonas. ID on board and agrees.  - LPS on board  -Orthopedic surgery: partial great toe amputation and flexor tenotomies of 2-5 toes. -   _ Okay start apixaban and eliquis per Ortho  -resume diabetic diet post operatively

## 2020-05-14 NOTE — PROGRESS NOTES
Assumed care of pt upon transfer from ED. Pt aaox4, VSS. Pt on IV vanco. MRI L-foot scheduled. HD planned for today. Plan of care reviewed. W/pt. Hourly rounding in place. Will continue to monitor.

## 2020-05-14 NOTE — CONSULTS
[facility] -  CONSULTATION NOTE               Author: Boston Tristan M.D. Date & Time: 5/14/2020  1:34 PM       REASON FOR CONSULTATION:   - Inpatient hemodialysis management.    CHIEF COMPLAINT:   -  Fever, left toe hurting and draining     HISTORY OF PRESENT ILLNESS:    73 yo M PMH ESRD TTS iHD, HTN, type 2 DM, anemia of CKD, and CKD-MBD, CAD s/p stents, IDDM, A. fib (currently on apixaban), hypothyroidism and GERD who presented to the [facility] ER with 2 episodes of fever (fever of 101.4F yesterday evening, associated with  worsening pain of the left great toe x 2-3 days. Has had chronic small left great toe ischemic wound going on for many weeks now. Has undelrying severe PAD and S/P Left extremity angiography and angioplasty + thrombectomy of the left posterior tibial artery and left proximal anterior tibial artery - April 2020. Was recently admitted to this hospital last month for similar problems. No aggravating or alleviating factors. He is being admitted for IV antibiotics and we have been consulted for management of dialysis while he is in hospital.     REVIEW OF SYSTEMS:    Review of Systems   Constitutional: Positive for chills, fever and malaise/fatigue.   HENT: Negative for ear pain and sore throat.    Eyes: Negative for pain and redness.   Respiratory: Negative for cough and hemoptysis.    Cardiovascular: Positive for leg swelling. Negative for chest pain.   Gastrointestinal: Negative for abdominal pain and nausea.   Musculoskeletal: Positive for joint pain. Negative for myalgias.   Skin: Negative for itching and rash.   Neurological: Negative for dizziness and headaches.   Endo/Heme/Allergies: Negative for polydipsia. Does not bruise/bleed easily.   Psychiatric/Behavioral: Negative for depression and hallucinations.   All other systems reviewed and are negative.    PAST MEDICAL HISTORY:   - ESRD TTS iHD  - HTN  - type 2 DM  - Anemia of CKD  - CKD-MBD  - HLD  - CAD  -  "Hyperthyroidism    PAST SURGICAL HISTORY:   - Appendectomy  - Knee surgery  - RAVF  - Hernia repair with mesh  - Coronary stent    FAMILY HISTORY:   - No CKD/ESRD that he is aware of  - Otherwise reviewed and non contributory to current illness    SOCIAL HISTORY:   - 1 ppd x 50 yrs, now quit for a few years  - No EtOH  - No illicits    HOME MEDICATIONS:   - Reviewed and documented in chart    LABORATORY STUDIES:   - Reviewed and documented in chart    ALLERGIES:  Mircera [methoxy polyethylene glycol-epoetin beta]    VS:  /90   Pulse 96   Temp 37.6 °C (99.6 °F) (Temporal)   Resp 18   Ht 1.676 m (5' 6\")   Wt 76.3 kg (168 lb 3.4 oz)   SpO2 98%   BMI 27.15 kg/m²   Physical Exam  Vitals signs and nursing note reviewed.   Constitutional:       General: He is not in acute distress.     Appearance: Normal appearance.   HENT:      Head: Normocephalic and atraumatic.      Right Ear: External ear normal.      Left Ear: External ear normal.      Nose: Nose normal. No congestion.      Mouth/Throat:      Mouth: Mucous membranes are moist.      Pharynx: No oropharyngeal exudate.   Eyes:      General:         Right eye: No discharge.         Left eye: No discharge.      Extraocular Movements: Extraocular movements intact.   Neck:      Musculoskeletal: Normal range of motion and neck supple. No muscular tenderness.   Cardiovascular:      Rate and Rhythm: Normal rate and regular rhythm.      Heart sounds: Normal heart sounds. No murmur.   Pulmonary:      Effort: Pulmonary effort is normal.      Breath sounds: Normal breath sounds.   Abdominal:      General: Bowel sounds are normal.      Palpations: Abdomen is soft.   Musculoskeletal:         General: Swelling (Left great toe) and deformity (Open wound left great toe) present. No tenderness.      Left lower leg: Edema present.   Skin:     General: Skin is warm and dry.      Findings: Erythema present. No bruising.   Neurological:      Mental Status: He is alert and " oriented to person, place, and time. Mental status is at baseline.      Motor: No weakness.   Psychiatric:         Mood and Affect: Mood normal.         Behavior: Behavior normal.       LABS:  Recent Results (from the past 24 hour(s))   LACTIC ACID    Collection Time: 05/14/20  8:52 AM   Result Value Ref Range    Lactic Acid 2.0 0.5 - 2.0 mmol/L   CBC WITH DIFFERENTIAL    Collection Time: 05/14/20  8:52 AM   Result Value Ref Range    WBC 9.7 4.8 - 10.8 K/uL    RBC 3.65 (L) 4.70 - 6.10 M/uL    Hemoglobin 12.0 (L) 14.0 - 18.0 g/dL    Hematocrit 34.1 (L) 42.0 - 52.0 %    MCV 93.4 81.4 - 97.8 fL    MCH 32.9 27.0 - 33.0 pg    MCHC 35.2 33.7 - 35.3 g/dL    RDW 47.7 35.9 - 50.0 fL    Platelet Count 165 164 - 446 K/uL    MPV 8.8 (L) 9.0 - 12.9 fL    Neutrophils-Polys 86.30 (H) 44.00 - 72.00 %    Lymphocytes 7.30 (L) 22.00 - 41.00 %    Monocytes 5.50 0.00 - 13.40 %    Eosinophils 0.20 0.00 - 6.90 %    Basophils 0.30 0.00 - 1.80 %    Immature Granulocytes 0.40 0.00 - 0.90 %    Nucleated RBC 0.00 /100 WBC    Neutrophils (Absolute) 8.35 (H) 1.82 - 7.42 K/uL    Lymphs (Absolute) 0.71 (L) 1.00 - 4.80 K/uL    Monos (Absolute) 0.53 0.00 - 0.85 K/uL    Eos (Absolute) 0.02 0.00 - 0.51 K/uL    Baso (Absolute) 0.03 0.00 - 0.12 K/uL    Immature Granulocytes (abs) 0.04 0.00 - 0.11 K/uL    NRBC (Absolute) 0.00 K/uL   COMP METABOLIC PANEL    Collection Time: 05/14/20  8:52 AM   Result Value Ref Range    Sodium 130 (L) 135 - 145 mmol/L    Potassium 4.7 3.6 - 5.5 mmol/L    Chloride 91 (L) 96 - 112 mmol/L    Co2 22 20 - 33 mmol/L    Anion Gap 17.0 (H) 7.0 - 16.0    Glucose 194 (H) 65 - 99 mg/dL    Bun 44 (H) 8 - 22 mg/dL    Creatinine 6.05 (HH) 0.50 - 1.40 mg/dL    Calcium 9.5 8.5 - 10.5 mg/dL    AST(SGOT) 13 12 - 45 U/L    ALT(SGPT) 13 2 - 50 U/L    Alkaline Phosphatase 147 (H) 30 - 99 U/L    Total Bilirubin 0.8 0.1 - 1.5 mg/dL    Albumin 4.0 3.2 - 4.9 g/dL    Total Protein 7.3 6.0 - 8.2 g/dL    Globulin 3.3 1.9 - 3.5 g/dL    A-G Ratio 1.2  g/dL   Sed Rate    Collection Time: 20  8:52 AM   Result Value Ref Range    Sed Rate Westergren 58 (H) 0 - 20 mm/hour   CRP QUANTITIVE (NON-CARDIAC)    Collection Time: 20  8:52 AM   Result Value Ref Range    Stat C-Reactive Protein 2.91 (H) 0.00 - 0.75 mg/dL   ESTIMATED GFR    Collection Time: 20  8:52 AM   Result Value Ref Range    GFR If  11 (A) >60 mL/min/1.73 m 2    GFR If Non African American 9 (A) >60 mL/min/1.73 m 2   COVID/SARS CoV-2    Collection Time: 20  9:00 AM    Specimen: Nasopharyngeal; Respirate   Result Value Ref Range    COVID Order Status Received    SARS-CoV-2, PCR (In-House)    Collection Time: 20  9:00 AM   Result Value Ref Range    SARS-CoV-2 Source NP Swab     SARS-CoV-2 by PCR NotDetected NotDetected   EKG    Collection Time: 20  9:54 AM   Result Value Ref Range    Report       [facility] Emergency Dept.    Test Date:  2020  Pt Name:    KLARISSA BRADSHAW              Department: ER  MRN:        [id number]                      Room:       [facility]  Gender:     Male                         Technician: [id number]  :        1945                   Requested By:ÁNGEL SMALL  Order #:    [id number]                    Reading MD: ÁNGEL SMALL MD    Measurements  Intervals                                Axis  Rate:       90                           P:          53  NC:         192                          QRS:        -73  QRSD:       136                          T:          85  QT:         392  QTc:        480    Interpretive Statements  SINUS RHYTHM  VENTRICULAR TRIGEMINY  RBBB AND LAFB  Compared to ECG 2020 21:14:24  Ventricular premature complex(es) now present  Electronically Signed On 2020 10:35:34 PDT by ÁNGEL SMALL MD     LACTIC ACID    Collection Time: 20 11:00 AM   Result Value Ref Range    Lactic Acid 1.6 0.5 - 2.0 mmol/L   ACCU-CHEK GLUCOSE    Collection Time: 20 12:31 PM   Result  Value Ref Range    Glucose - Accu-Ck 132 (H) 65 - 99 mg/dL       (click the triangle to expand results)    IMAGING:  DX-FOOT-COMPLETE 3+ LEFT   Final Result         1. No fracture or dislocation. No cortical destruction of the great toe to suggest osteomyelitis.   2. Mild to moderate multifocal osteoarthrosis.      DX-CHEST-PORTABLE (1 VIEW)   Final Result      Chronic scarring in the left lung base. No new consolidation of pleural effusions.      MR-FOOT-W/O LEFT    (Results Pending)       IMPRESSION:  - ESRD    * Etiology likely 2/2 HTN/DM    * TTS iHD @ [place] [facility]  - Left foot ischemic ulcer / Severe PAD     * Likely diabetic related    * S/P Left extremity angiography and angioplasty + thrombectomy of the left posterior tibial artery and left proximal anterior tibial artery - April 2020     *XR foot (5/14/2020): No fracture or dislocation.  No cortical destruction of the great toe    ** Pending MRI   - HTN    * Goal BP < 140/90    * Stable  - Anemia of CKD    * Goal Hgb 10-11    * Stable  - CKD-MBD    * Managed at HD unit  - HLD    * On statin  - CAD    * On statin    PLAN:  - Due for HD today - will start TTS iHD  - UF as tolerated  - Agree with empiric IV Antibiotics for Toe cellulitis and fever, Pan culture and follow ID to de-escalate antibiotics and determine course. Follow [facility]   - Continue home PO4 binders  - No dietary protein restrictions  - Abx per primary team, dosed appropriately for ESRD   - Dose all meds per ESRD    All prior notes form other doctors and RN staff were reviewed from admission to current day to help me make my clinical decisions    Thank you for the consultation!

## 2020-05-14 NOTE — PROGRESS NOTES
· 2 RN skin check complete. W/JAN Sweeney  · Devices in place n/a.  · Skin assessed under devices n/a.  · Confirmed pressure ulcers found on n/a.  · New potential pressure ulcers noted on L-great toe. Wound consult placed? yes. Photo uploaded? yes.   · The following interventions are in place n/a.

## 2020-05-14 NOTE — DISCHARGE PLANNING
Outpatient Dialysis Note    Confirmed patient is active at:    [facility]  [address]  [place] [address]    Schedule: Tuesday, Thursday, Saturday  Time: 06:`15am     Patient had No HD this am per Clinic.    Spoke with Clayton at facility who confirmed.    Forwarded records for review.    Becca Bojorquez- Dialysis Coordinator  [facility] # [phone]

## 2020-05-14 NOTE — ED NOTES
PIV initiated, blood drawn and sent to lab including one set of blood cultures. Call light provided.

## 2020-05-14 NOTE — ED NOTES
Pt siting up comfortably, no needs at this time, bed in lowered position, side rails up, call light in reach

## 2020-05-15 ENCOUNTER — APPOINTMENT (OUTPATIENT)
Dept: RADIOLOGY | Facility: MEDICAL CENTER | Age: 75
DRG: 617 | End: 2020-05-15
Attending: STUDENT IN AN ORGANIZED HEALTH CARE EDUCATION/TRAINING PROGRAM
Payer: MEDICARE

## 2020-05-15 LAB
ALBUMIN SERPL BCP-MCNC: 3.5 G/DL (ref 3.2–4.9)
ALBUMIN/GLOB SERPL: 1.2 G/DL
ALP SERPL-CCNC: 118 U/L (ref 30–99)
ALT SERPL-CCNC: 12 U/L (ref 2–50)
ANION GAP SERPL CALC-SCNC: 12 MMOL/L (ref 7–16)
APPEARANCE UR: CLEAR
AST SERPL-CCNC: 17 U/L (ref 12–45)
BACTERIA #/AREA URNS HPF: NEGATIVE /HPF
BASOPHILS # BLD AUTO: 0.3 % (ref 0–1.8)
BASOPHILS # BLD: 0.02 K/UL (ref 0–0.12)
BILIRUB SERPL-MCNC: 0.5 MG/DL (ref 0.1–1.5)
BILIRUB UR QL STRIP.AUTO: NEGATIVE
BUN SERPL-MCNC: 26 MG/DL (ref 8–22)
CALCIUM SERPL-MCNC: 9.3 MG/DL (ref 8.5–10.5)
CHLORIDE SERPL-SCNC: 92 MMOL/L (ref 96–112)
CO2 SERPL-SCNC: 23 MMOL/L (ref 20–33)
COLOR UR: YELLOW
CREAT SERPL-MCNC: 3.99 MG/DL (ref 0.5–1.4)
EOSINOPHIL # BLD AUTO: 0.01 K/UL (ref 0–0.51)
EOSINOPHIL NFR BLD: 0.2 % (ref 0–6.9)
EPI CELLS #/AREA URNS HPF: NEGATIVE /HPF
ERYTHROCYTE [DISTWIDTH] IN BLOOD BY AUTOMATED COUNT: 49.5 FL (ref 35.9–50)
GLOBULIN SER CALC-MCNC: 2.9 G/DL (ref 1.9–3.5)
GLUCOSE BLD-MCNC: 129 MG/DL (ref 65–99)
GLUCOSE BLD-MCNC: 204 MG/DL (ref 65–99)
GLUCOSE BLD-MCNC: 204 MG/DL (ref 65–99)
GLUCOSE BLD-MCNC: 259 MG/DL (ref 65–99)
GLUCOSE SERPL-MCNC: 142 MG/DL (ref 65–99)
GLUCOSE UR STRIP.AUTO-MCNC: 250 MG/DL
HCT VFR BLD AUTO: 30.4 % (ref 42–52)
HGB BLD-MCNC: 10.5 G/DL (ref 14–18)
HYALINE CASTS #/AREA URNS LPF: ABNORMAL /LPF
IMM GRANULOCYTES # BLD AUTO: 0.03 K/UL (ref 0–0.11)
IMM GRANULOCYTES NFR BLD AUTO: 0.5 % (ref 0–0.9)
KETONES UR STRIP.AUTO-MCNC: NEGATIVE MG/DL
LEUKOCYTE ESTERASE UR QL STRIP.AUTO: NEGATIVE
LYMPHOCYTES # BLD AUTO: 0.71 K/UL (ref 1–4.8)
LYMPHOCYTES NFR BLD: 12.3 % (ref 22–41)
MAGNESIUM SERPL-MCNC: 1.8 MG/DL (ref 1.5–2.5)
MCH RBC QN AUTO: 32.5 PG (ref 27–33)
MCHC RBC AUTO-ENTMCNC: 34.5 G/DL (ref 33.7–35.3)
MCV RBC AUTO: 94.1 FL (ref 81.4–97.8)
MICRO URNS: ABNORMAL
MONOCYTES # BLD AUTO: 0.7 K/UL (ref 0–0.85)
MONOCYTES NFR BLD AUTO: 12.2 % (ref 0–13.4)
NEUTROPHILS # BLD AUTO: 4.29 K/UL (ref 1.82–7.42)
NEUTROPHILS NFR BLD: 74.5 % (ref 44–72)
NITRITE UR QL STRIP.AUTO: NEGATIVE
NRBC # BLD AUTO: 0 K/UL
NRBC BLD-RTO: 0 /100 WBC
PH UR STRIP.AUTO: 8.5 [PH] (ref 5–8)
PHOSPHATE SERPL-MCNC: 3.3 MG/DL (ref 2.5–4.5)
PLATELET # BLD AUTO: 155 K/UL (ref 164–446)
PMV BLD AUTO: 9 FL (ref 9–12.9)
POTASSIUM SERPL-SCNC: 3.8 MMOL/L (ref 3.6–5.5)
PROT SERPL-MCNC: 6.4 G/DL (ref 6–8.2)
PROT UR QL STRIP: 300 MG/DL
RBC # BLD AUTO: 3.23 M/UL (ref 4.7–6.1)
RBC # URNS HPF: ABNORMAL /HPF
RBC UR QL AUTO: NEGATIVE
SODIUM SERPL-SCNC: 127 MMOL/L (ref 135–145)
SP GR UR STRIP.AUTO: 1.01
UROBILINOGEN UR STRIP.AUTO-MCNC: 1 MG/DL
WBC # BLD AUTO: 5.8 K/UL (ref 4.8–10.8)
WBC #/AREA URNS HPF: ABNORMAL /HPF

## 2020-05-15 PROCEDURE — 80053 COMPREHEN METABOLIC PANEL: CPT

## 2020-05-15 PROCEDURE — 83735 ASSAY OF MAGNESIUM: CPT

## 2020-05-15 PROCEDURE — 84100 ASSAY OF PHOSPHORUS: CPT

## 2020-05-15 PROCEDURE — 700102 HCHG RX REV CODE 250 W/ 637 OVERRIDE(OP): Performed by: HOSPITALIST

## 2020-05-15 PROCEDURE — 770006 HCHG ROOM/CARE - MED/SURG/GYN SEMI*

## 2020-05-15 PROCEDURE — 700111 HCHG RX REV CODE 636 W/ 250 OVERRIDE (IP): Performed by: STUDENT IN AN ORGANIZED HEALTH CARE EDUCATION/TRAINING PROGRAM

## 2020-05-15 PROCEDURE — 700105 HCHG RX REV CODE 258: Performed by: STUDENT IN AN ORGANIZED HEALTH CARE EDUCATION/TRAINING PROGRAM

## 2020-05-15 PROCEDURE — 99222 1ST HOSP IP/OBS MODERATE 55: CPT | Performed by: NURSE PRACTITIONER

## 2020-05-15 PROCEDURE — 99223 1ST HOSP IP/OBS HIGH 75: CPT | Mod: GC | Performed by: HOSPITALIST

## 2020-05-15 PROCEDURE — 81001 URINALYSIS AUTO W/SCOPE: CPT

## 2020-05-15 PROCEDURE — A9270 NON-COVERED ITEM OR SERVICE: HCPCS | Performed by: STUDENT IN AN ORGANIZED HEALTH CARE EDUCATION/TRAINING PROGRAM

## 2020-05-15 PROCEDURE — 87086 URINE CULTURE/COLONY COUNT: CPT

## 2020-05-15 PROCEDURE — 85025 COMPLETE CBC W/AUTO DIFF WBC: CPT

## 2020-05-15 PROCEDURE — 36415 COLL VENOUS BLD VENIPUNCTURE: CPT

## 2020-05-15 PROCEDURE — 82962 GLUCOSE BLOOD TEST: CPT | Mod: 91

## 2020-05-15 PROCEDURE — 700102 HCHG RX REV CODE 250 W/ 637 OVERRIDE(OP): Performed by: STUDENT IN AN ORGANIZED HEALTH CARE EDUCATION/TRAINING PROGRAM

## 2020-05-15 PROCEDURE — 73718 MRI LOWER EXTREMITY W/O DYE: CPT | Mod: LT

## 2020-05-15 RX ADMIN — APIXABAN 2.5 MG: 2.5 TABLET, FILM COATED ORAL at 17:05

## 2020-05-15 RX ADMIN — METHIMAZOLE 5 MG: 5 TABLET ORAL at 17:05

## 2020-05-15 RX ADMIN — Medication 1334 MG: at 07:32

## 2020-05-15 RX ADMIN — INSULIN LISPRO 2 UNITS: 100 INJECTION, SOLUTION INTRAVENOUS; SUBCUTANEOUS at 20:26

## 2020-05-15 RX ADMIN — INSULIN GLARGINE 5 UNITS: 100 INJECTION, SOLUTION SUBCUTANEOUS at 17:09

## 2020-05-15 RX ADMIN — INSULIN LISPRO 2 UNITS: 100 INJECTION, SOLUTION INTRAVENOUS; SUBCUTANEOUS at 17:08

## 2020-05-15 RX ADMIN — OMEPRAZOLE 20 MG: 20 CAPSULE, DELAYED RELEASE ORAL at 20:24

## 2020-05-15 RX ADMIN — METHIMAZOLE 7.5 MG: 5 TABLET ORAL at 05:15

## 2020-05-15 RX ADMIN — Medication 1334 MG: at 17:05

## 2020-05-15 RX ADMIN — CLOPIDOGREL BISULFATE 75 MG: 75 TABLET ORAL at 05:14

## 2020-05-15 RX ADMIN — CEFTRIAXONE SODIUM 2 G: 2 INJECTION, POWDER, FOR SOLUTION INTRAMUSCULAR; INTRAVENOUS at 17:04

## 2020-05-15 RX ADMIN — CALCITRIOL CAPSULES 0.25 MCG 0.25 MCG: 0.25 CAPSULE ORAL at 05:15

## 2020-05-15 RX ADMIN — ACETAMINOPHEN 650 MG: 325 TABLET, FILM COATED ORAL at 20:24

## 2020-05-15 RX ADMIN — ACETAMINOPHEN 650 MG: 325 TABLET, FILM COATED ORAL at 00:16

## 2020-05-15 RX ADMIN — CARVEDILOL 3.12 MG: 3.12 TABLET, FILM COATED ORAL at 17:06

## 2020-05-15 RX ADMIN — ACETAMINOPHEN 650 MG: 325 TABLET, FILM COATED ORAL at 12:08

## 2020-05-15 RX ADMIN — APIXABAN 2.5 MG: 2.5 TABLET, FILM COATED ORAL at 05:15

## 2020-05-15 RX ADMIN — INSULIN LISPRO 3 UNITS: 100 INJECTION, SOLUTION INTRAVENOUS; SUBCUTANEOUS at 11:22

## 2020-05-15 RX ADMIN — CARVEDILOL 3.12 MG: 3.12 TABLET, FILM COATED ORAL at 07:32

## 2020-05-15 RX ADMIN — ATORVASTATIN CALCIUM 40 MG: 40 TABLET, FILM COATED ORAL at 20:24

## 2020-05-15 RX ADMIN — Medication 1334 MG: at 12:57

## 2020-05-15 ASSESSMENT — ENCOUNTER SYMPTOMS
DIARRHEA: 0
NAUSEA: 1
FOCAL WEAKNESS: 0
HEMOPTYSIS: 0
NAUSEA: 0
PALPITATIONS: 0
PHOTOPHOBIA: 0
WEIGHT LOSS: 0
VOMITING: 0
NECK PAIN: 0
COUGH: 0
CONSTIPATION: 0
BRUISES/BLEEDS EASILY: 0
DEPRESSION: 0
SHORTNESS OF BREATH: 0
SENSORY CHANGE: 1
DIAPHORESIS: 0
SINUS PAIN: 0
NERVOUS/ANXIOUS: 0
BLURRED VISION: 0
CLAUDICATION: 0
FEVER: 0
SPEECH CHANGE: 0
TINGLING: 0
ABDOMINAL PAIN: 0
HEADACHES: 0
CHILLS: 0
FALLS: 0
DIZZINESS: 0
BLOOD IN STOOL: 0
FLANK PAIN: 0
DOUBLE VISION: 0
SPUTUM PRODUCTION: 0
MYALGIAS: 0
SORE THROAT: 0

## 2020-05-15 ASSESSMENT — LIFESTYLE VARIABLES: SUBSTANCE_ABUSE: 0

## 2020-05-15 NOTE — PROGRESS NOTES
Report received from JULIEN Tompkins RN. Assumed care at 1900, assessment complete. Pt is A & O x 4.  Pt c/o 6/10 L hip pain, pt states he will wait till APAP is due for pain, pt states that laying on his L hip helps to relieve some pain. Pt states that L hip pain is new from this morning, denies any falls, doesn't recall how exactly he hurt L hip, states it may have been when getting out of bed. Fall precautions and appropriate signs in place. Pt oriented to unit routine, call light/phone system and RN extension number provided. Pt educated regarding fall precautions. Bed alarm in use. Pt denies any additional needs at this time. Call light within reach. Pt toileted, able to make needs known verbally.

## 2020-05-15 NOTE — PROGRESS NOTES
"Pharmacy Kinetics 74 y.o. male on vancomycin day # 2   5/15/2020    Currently on Vancomycin pulse dosing   20 @ 0930 Vancomycin 1900 mg iv x 1 load     Provider specified end date: TBD    Indication for Treatment: Cellulitis of the left foot with ulcer of great toe     Pertinent history per medical record: Admitted on 2020 for ulcer of left great toe with superimposed cellulitis who had recent admission (-20) for left great toe ulcer noted to be febrile the night prior and day of admission. Pt was told to come to the ED instead of dialysis due to fever. Pt started empirically on vancomycin and ceftriaxone, LPS consulted. Blood cultures obtained and wound and urine cultures ordered. PMH: ESRD on iHD, paroxysmal afib, IDDM, CAD, hypothyroidism, and GERD.     Other antibiotics: ceftriaxone 2 g IV q24h     Allergies: Mircera [methoxy polyethylene glycol-epoetin beta]     List concerns for renal function: ESRD (Tu/Thu/Sat)     Pertinent cultures to date:   20 Peripheral blood culture: NGTD  20 urine culture: in process   20 COVID: negative      MRSA nares swab if pneumonia is a concern (ordered/positive/negative/n-a): N/A    Recent Labs     20  0852 05/15/20  0406   WBC 9.7 5.8   NEUTSPOLYS 86.30* 74.50*     Recent Labs     20  0852 05/15/20  0406   BUN 44* 26*   CREATININE 6.05* 3.99*   ALBUMIN 4.0 3.5     No results for input(s): VANCOTROUGH, VANCOPEAK, VANCORANDOM in the last 72 hours.    Intake/Output Summary (Last 24 hours) at 5/15/2020 1011  Last data filed at 5/15/2020 0900  Gross per 24 hour   Intake 860 ml   Output 2222 ml   Net -1362 ml      /46   Pulse 86   Temp 37.3 °C (99.2 °F) (Temporal)   Resp 16   Ht 1.676 m (5' 6\")   Wt 76.3 kg (168 lb 3.4 oz)   SpO2 98%  Temp (24hrs), Av.5 °C (99.5 °F), Min:37.3 °C (99.1 °F), Max:37.7 °C (99.8 °F)      A/P   1. Vancomycin dose change: pulse dosing, no dose today  2. Next vancomycin level: 20 with AM labs " (ordered)   3. Goal trough: 12-16 mcg/mL   4. Comments: Pt received 25 mg/kg load yesterday followed by a session of iHD yesterday afternoon. Pt does not make urine. Blood cultures NGTD and urine cx in process. LPS familiar with pt and consulted for this admission. Plan for vancomycin random level on Sunday 5/17. Pharmacy will continue to monitor.     Bobbi Guerra, PharmD, BCOP

## 2020-05-15 NOTE — PROGRESS NOTES
Assumed care of pt at 0700. VSS, AAOX4. MRI of L-foot ordered. IV abx continued Plan of care reviewed w/pt. Hourly rounding in place. Will continue to monitor.

## 2020-05-15 NOTE — CARE PLAN
Problem: Communication  Goal: The ability to communicate needs accurately and effectively will improve  Outcome: PROGRESSING AS EXPECTED  Note: Pt will communicate needs accurately and effectively during stay, pt is able to make needs known verbally. Pt demonstrates appropriate call light use and educated about calling for assistance when needed.      Problem: Knowledge Deficit  Goal: Knowledge of disease process/condition, treatment plan, diagnostic tests, and medications will improve  Outcome: PROGRESSING AS EXPECTED  Note: Pt will understand tx/care plan provided, pt educated about care when in the room. Pt encouraged to voice questions and concerns, pt encouraged to participate in care as much as possible.

## 2020-05-15 NOTE — PROGRESS NOTES
[facility] Daily Progress Note    Date of Service  5/15/2020    Chief Complaint  Follow up ESRD    Interval Problem Update  75 yo M PMH ESRD TTS iHD, HTN, type 2 DM, anemia of CKD, and CKD-MBD, CAD s/p stents, IDDM, A. fib (currently on apixaban), hypothyroidism and GERD who presented to the [facility] ER with 2 episodes of fever (fever of 101.4F yesterday evening, associated with  worsening pain of the left great toe x 2-3 days. Has had chronic small left great toe ischemic wound going on for many weeks now. Has undelrying severe PAD and S/P Left extremity angiography and angioplasty + thrombectomy of the left posterior tibial artery and left proximal anterior tibial artery - April 2020. Was recently admitted to this hospital last month for similar problems. No aggravating or alleviating factors. He is being admitted for IV antibiotics and we have been consulted for management of dialysis while he is in hospital.     DAILY NEPHROLOGY SUMMARY  5/15/20--No events, afebrile overnight but pt reported some chills, BP stable, tolerated HD yest with 1.7L UF, MRI without gadolinium today showed osteomyelitis of left great toe, pt reports that he has been drinking more PO fluid lately    Review of Systems  Review of Systems   Constitutional: Positive for malaise/fatigue. Negative for chills and fever.   HENT: Negative for congestion, nosebleeds and sore throat.    Eyes: Negative for blurred vision, double vision and photophobia.   Respiratory: Negative for cough, hemoptysis, sputum production and shortness of breath.    Cardiovascular: Negative for chest pain, palpitations and leg swelling.   Gastrointestinal: Negative for abdominal pain, diarrhea, nausea and vomiting.   Genitourinary: Negative for hematuria.   Musculoskeletal: Negative for myalgias and neck pain.   Skin: Negative for itching and rash.        +Left toe ulcer   Neurological: Negative for dizziness, focal weakness and headaches.    Endo/Heme/Allergies: Negative for environmental allergies. Does not bruise/bleed easily.   Psychiatric/Behavioral: Negative for depression. The patient is not nervous/anxious.         Physical Exam  Temp:  [37.3 °C (99.1 °F)-37.7 °C (99.8 °F)] 37.3 °C (99.2 °F)  Pulse:  [70-93] 86  Resp:  [16-19] 16  BP: ()/(41-54) 144/46  SpO2:  [93 %-98 %] 98 %    Physical Exam  Vitals signs and nursing note reviewed.   Constitutional:       General: He is not in acute distress.     Appearance: Normal appearance.   HENT:      Head: Normocephalic and atraumatic.      Right Ear: External ear normal.      Left Ear: External ear normal.      Nose: Nose normal. No congestion.      Mouth/Throat:      Mouth: Mucous membranes are moist.      Pharynx: Oropharynx is clear.   Eyes:      General: No scleral icterus.     Extraocular Movements: Extraocular movements intact.      Conjunctiva/sclera: Conjunctivae normal.   Neck:      Musculoskeletal: Normal range of motion. No neck rigidity.   Cardiovascular:      Rate and Rhythm: Normal rate and regular rhythm.      Heart sounds: No murmur.   Pulmonary:      Effort: Pulmonary effort is normal. No respiratory distress.      Breath sounds: Normal breath sounds. No wheezing.      Comments: +R Chest PC  Abdominal:      General: Bowel sounds are normal. There is no distension.      Palpations: Abdomen is soft.      Tenderness: There is no abdominal tenderness.   Musculoskeletal: Normal range of motion.         General: No swelling or deformity.      Comments: +L arm AVF with thrill/bruit   Skin:     General: Skin is warm and dry.      Findings: Lesion (Left 1st toe ulcer with black eschar) present.   Neurological:      General: No focal deficit present.      Mental Status: He is alert and oriented to person, place, and time. Mental status is at baseline.   Psychiatric:         Mood and Affect: Mood normal.         Behavior: Behavior normal.         Fluids    Intake/Output Summary (Last 24  hours) at 5/15/2020 1349  Last data filed at 5/15/2020 0900  Gross per 24 hour   Intake 860 ml   Output 2222 ml   Net -1362 ml       Laboratory  Recent Labs     05/14/20  0852 05/15/20  0406   WBC 9.7 5.8   RBC 3.65* 3.23*   HEMOGLOBIN 12.0* 10.5*   HEMATOCRIT 34.1* 30.4*   MCV 93.4 94.1   MCH 32.9 32.5   MCHC 35.2 34.5   RDW 47.7 49.5   PLATELETCT 165 155*   MPV 8.8* 9.0     Recent Labs     05/14/20  0852 05/15/20  0406   SODIUM 130* 127*   POTASSIUM 4.7 3.8   CHLORIDE 91* 92*   CO2 22 23   GLUCOSE 194* 142*   BUN 44* 26*   CREATININE 6.05* 3.99*   CALCIUM 9.5 9.3         No results for input(s): NTPROBNP in the last 72 hours.        **I have reviewed all labs and imaging reports    IMPRESSION:  - ESRD    * Etiology likely 2/2 HTN/DM    * TTS iHD @ [facility]  - Left foot ischemic ulcer / Severe PAD     * Likely diabetic related    * S/P Left extremity angiography and angioplasty + thrombectomy of the left posterior tibial artery and left proximal anterior tibial artery - April 2020     *XR foot (5/14/2020): No fracture or dislocation.  No cortical destruction of the great toe    *MRI 5/15 without IV rafael shows osteomyelitis of left great toe  - HTN    * Goal BP < 140/90    * Controlled  - Anemia of CKD    * Goal Hgb 10-11    * Stable  - CKD-MBD    * Managed at HD unit    * Phos stable  - HLD    * On statin  - CAD    * On statin  - Hyponatremia--will treat with HD    * Pt also admits to drinking a lot of PO fluid lately  - Fever--resolved    * Blood cultures x2 5/14/20 negative     PLAN:  - HD tomorrow (SAT) and continue qTTS dialysis schedule  - UF as tolerated  - Recommend 1.5L oral fluid restriction  - Abx for osteomyelitis per primary svc  - Dose adjust abx and all meds for ESRD requiring HD  - Continue home PO4 binders  - No dietary protein restrictions  - No need for ARMANDO at this time  - Follow up blood culture results  - Avoid PICC lines and Midlines in this ESRD patient to preserve vascular access  - If pt  will require access for long-term IV abx recommend a tunneled central line

## 2020-05-15 NOTE — PROGRESS NOTES
Daily Progress Note:     Date of Service: 5/15/2020  Primary Team: [employer] Yellow Team   Attending: Vicky Middleton M.D.   Senior Resident: Dr. Mendez  Intern: Dr. Sanchez  Contact:  [phone]    Chief Complaint:   Cellulitis of the left foot with ulcer of the great toe    Subjective  No acute events overnight. Patient states that the pain and redness in his left foot has improved. He has not seen any discharge from the left great toe ulcer. Patient does report some chills overnight but none this morning. He understands the plan for today and all questions answered.     Consultants/Specialty:  none  Review of Systems:    Review of Systems   Constitutional: Negative for chills, diaphoresis, fever, malaise/fatigue and weight loss.   HENT: Negative for congestion, sinus pain and sore throat.    Eyes: Negative for blurred vision and double vision.   Respiratory: Negative for cough, hemoptysis, sputum production and shortness of breath.    Cardiovascular: Positive for leg swelling (left leg). Negative for chest pain, palpitations and claudication.   Gastrointestinal: Positive for nausea (chronic). Negative for abdominal pain, blood in stool, constipation, diarrhea and vomiting.   Genitourinary: Negative for dysuria, flank pain and hematuria.   Musculoskeletal: Negative for falls and myalgias.   Neurological: Positive for sensory change (Loss of sensation Bilateral feet (chronic)). Negative for dizziness, tingling, speech change, focal weakness and headaches.   Endo/Heme/Allergies: Does not bruise/bleed easily.   Psychiatric/Behavioral: Negative for depression and substance abuse. The patient is not nervous/anxious.        Objective Data:   Physical Exam:   Vitals:   Temp:  [37.2 °C (99 °F)-37.7 °C (99.8 °F)] 37.6 °C (99.6 °F)  Pulse:  [] 90  Resp:  [18-39] 19  BP: ()/(41-90) 131/54  SpO2:  [93 %-99 %] 98 %     Physical Exam  Vitals signs and nursing note reviewed.   Constitutional:       General: He is  not in acute distress.     Appearance: He is not diaphoretic.   HENT:      Head: Normocephalic and atraumatic.      Right Ear: External ear normal.      Left Ear: External ear normal.      Nose: Nose normal. No congestion.      Mouth/Throat:      Mouth: Mucous membranes are moist.      Pharynx: Oropharynx is clear. No oropharyngeal exudate.   Eyes:      Extraocular Movements: Extraocular movements intact.      Conjunctiva/sclera: Conjunctivae normal.      Pupils: Pupils are equal, round, and reactive to light.   Neck:      Musculoskeletal: Normal range of motion and neck supple. No neck rigidity.   Cardiovascular:      Rate and Rhythm: Normal rate. Rhythm irregular.      Pulses: Normal pulses.      Heart sounds: No murmur.   Pulmonary:      Effort: Pulmonary effort is normal. No respiratory distress.      Breath sounds: Normal breath sounds. No wheezing.   Abdominal:      General: Abdomen is flat. Bowel sounds are normal. There is no distension.      Palpations: Abdomen is soft.      Tenderness: There is no abdominal tenderness. There is no guarding.   Musculoskeletal: Normal range of motion.      Right lower leg: No edema.      Comments: Left great toe with roughly 2 cm ulcer, improved edema up to the level of the ankle and mild erythema. Pain to palpation of the toe. No exposed bone.    Skin:     General: Skin is warm and dry.   Neurological:      General: No focal deficit present.      Mental Status: He is oriented to person, place, and time.      Cranial Nerves: No cranial nerve deficit.      Sensory: Sensory deficit (Bilateral loss on bottom of feet) present.      Motor: No weakness.   Psychiatric:         Mood and Affect: Mood normal.         Behavior: Behavior normal.         Thought Content: Thought content normal.           Labs:   Recent Labs     05/14/20  0852 05/15/20  0406   WBC 9.7 5.8   RBC 3.65* 3.23*   HEMOGLOBIN 12.0* 10.5*   HEMATOCRIT 34.1* 30.4*   MCV 93.4 94.1   MCH 32.9 32.5   RDW 47.7 49.5    PLATELETCT 165 155*   MPV 8.8* 9.0   NEUTSPOLYS 86.30* 74.50*   LYMPHOCYTES 7.30* 12.30*   MONOCYTES 5.50 12.20   EOSINOPHILS 0.20 0.20   BASOPHILS 0.30 0.30     Recent Labs     05/14/20  0852 05/15/20  0406   SODIUM 130* 127*   POTASSIUM 4.7 3.8   CHLORIDE 91* 92*   CO2 22 23   GLUCOSE 194* 142*   BUN 44* 26*     Recent Labs     05/14/20  0852 05/15/20  0406   ALBUMIN 4.0 3.5   TBILIRUBIN 0.8 0.5   ALKPHOSPHAT 147* 118*   TOTPROTEIN 7.3 6.4   ALTSGPT 13 12   ASTSGOT 13 17   CREATININE 6.05* 3.99*         Imaging:   DX-FOOT-COMPLETE 3+ LEFT   Final Result         1. No fracture or dislocation. No cortical destruction of the great toe to suggest osteomyelitis.   2. Mild to moderate multifocal osteoarthrosis.      DX-CHEST-PORTABLE (1 VIEW)   Final Result      Chronic scarring in the left lung base. No new consolidation of pleural effusions.      MR-FOOT-W/O LEFT    (Results Pending)       * Ulcer of left great toe with superimposed cellulitis- (present on admission)  Assessment & Plan  -2 episodes of fevers and worsening pain of the left foot most significant at the left great toe.  - WBC 9.7, CRP 2.91, ESR 58  - XR foot (5/14/2020): No fracture or dislocation.  No cortical destruction of the great toe.  - BC preliminarily negative  Plan:  - Continue vancomycin and ceftriaxone for gram negative coverage. No previous cultures with pseudomonas.  - MRI of the left foot to evaluate for osteomyelitis this patient has had repeated infections involving the left ulcer of the great toe  - LPS to evaluate patient      Peripheral vascular disease (HCC)- (present on admission)  Assessment & Plan  - On admission in April 2020 patient had vascular intervention with a left extremity angiography and angioplasty and thrombectomy of the left posterior tibial artery and left proximal anterior tibial artery  -Evaluated by vascular on 5/8/2020 no additional recommendations at that time healing well  Plan:  - Continue home apixaban and  clopidogrel    Paroxysmal A-fib ([facility])- (present on admission)  Assessment & Plan  - Continue home axipaban    End stage renal disease on dialysis ([facility])- (present on admission)  Assessment & Plan  -Patient on HD Tuesday, Thursday, Saturday  Plan:  - nephrology on board. HD yesterday. Next session Saturday.     Diabetes mellitus type 2 in nonobese ([facility])- (present on admission)  Assessment & Plan  -Continue home regiment of glargine 5 units q. Evening  -Humalog correctional scale  -Hypoglycemia protocol    Secondary hyperparathyroidism of renal origin ([facility])- (present on admission)  Assessment & Plan  - Continue home medications of calcitriol & calcium acetate    Hyperthyroidism- (present on admission)  Assessment & Plan  -Continue home methimazole dosing

## 2020-05-15 NOTE — CONSULTS
"LIMB PRESERVATION SERVICE CONSULT      REFERRED BY: Vicky Middleton M.D.    DATE OF CONSULTATION: 5/15/2020    REASON FOR CONSULT: Left great toe wound     HISTORY OF PRESENT ILLNESS: Luis Sepulveda is a 74 y.o.  with a past medical history that includes type 2 diabetes, chronic kidney disease on hemodialysis, hypertension, peripheral neuropathy, and peripheral vascular disease admitted 5/14/2020 for Fever of unknown origin (FUO).   LPS has been consulted for evaluation of left great toe.     The patient reports that approximately 4/17/2020 he noticed a small black spot to the distal tip of his left great toe.  He states that he noticed that the area was soft but it did not have any drainage, odor, erythema, or swelling.  The next day on 4/18/2020, he noticed that there was swelling to his left great toe and he contacted his podiatrist to be evaluated.  He saw his podiatrist on 4/19/2020 who stated that the patient needed to come to the emergency department for evaluation.   The patient was previously admitted 4/21/2020-4/25/2020 for diabetic foot infection.  The patient was negative for osteomyelitis time received IV antibiotics and was treated with offloading and local wound care..  On 4/24/2020, patient underwent left anterior tibial artery angioplasty, Percutaneous thrombectomy of left peroneal artery, Left peroneal artery angioplasty with Dr. Encinas. The patient reports that soon after discharge, the area of his wound had increased but had not had any other complications otherwise.  He states that he followed up with vascular surgery on 5/8/2020, had vein mapping procedure done, and was told that \"everything looked normal\".  He reports that on 5/13/2020, he developed a fever.  He states the next day he was due for dialysis but notify the dialysis center of his fever and sought care at the emergency department.  He states that he has had intermittent redness to his left great toe but was unsure if this " was due to recent revascularization procedure. He reports having chills and swelling in his left foot and left great toe that is relieved with elevation and describes having localized dull, throbbing pain in his left great foot/left great toe.     IV antibiotics were started on this admission.  Infectious diseases has not been consulted.    Xray completed and is negative for osteomyelitis.  MRI is pending  Ortho has not been consulted yet.    Diagnosed with diabetes in 2000, and is currently managing with Lantus nightly.  Checks blood sugars 2-3 times per day and reports that these typically average 100-130.  Has had previous diabetes education.  Does have numbness to feet.  Checks feet routinely. Usually wears prescriptive shoes.  Patient reports that prescriptive orthotics has been discussed with him but he has declined to use orthotics.  He states that his shoes are wide and have a protective toebox and think that this is enough protection for his feet. Has not had previous foot surgeries including.  Current occupation: Retired      Smoking:   reports that he quit smoking about 3801-4101. His smoking use included cigarettes. He has a 55.00 pack-year smoking history. He has never used smokeless tobacco.    Alcohol:   reports no history of alcohol use.    Drug:   reports no history of drug use.      PAST MEDICAL HISTORY:   Past Medical History:   Diagnosis Date   • CAD (coronary artery disease)     stents   • Chronic kidney disease (CKD), stage V (HCC)    • Diabetes    • Hypertension    • Osteoarthritis    • Peripheral neuropathy         PAST SURGICAL HISTORY:   Past Surgical History:   Procedure Laterality Date   • APPENDECTOMY     • OTHER CARDIAC SURGERY      stents x1   • OTHER ORTHOPEDIC SURGERY      knee surgery       MEDICATIONS:   Current Facility-Administered Medications   Medication Dose   • cefTRIAXone (ROCEPHIN) 2 g in  mL IVPB  2 g   • acetaminophen (TYLENOL) tablet 650 mg  650 mg   • apixaban  (ELIQUIS) tablet 2.5 mg  2.5 mg   • atorvastatin (LIPITOR) tablet 40 mg  40 mg   • calcitRIOL (ROCALTROL) capsule 0.25 mcg  0.25 mcg   • calcium acetate (PHOS-LO) 667 MG tablet 1,334 mg  1,334 mg   • carvedilol (COREG) tablet 3.125 mg  3.125 mg   • clopidogrel (PLAVIX) tablet 75 mg  75 mg   • methimazole (TAPAZOLE) tablet 7.5 mg  7.5 mg   • methimazole (TAPAZOLE) tablet 5 mg  5 mg   • omeprazole (PRILOSEC) capsule 20 mg  20 mg   • MD Alert...Vancomycin per Pharmacy     • heparin injection 3,700 Units  3,700 Units   • heparin injection 1,500 Units  1,500 Units   • insulin glargine (LANTUS) injection 5 Units  5 Units    And   • insulin lispro (HUMALOG) injection 1-6 Units  1-6 Units    And   • glucose 4 g chewable tablet 16 g  16 g    And   • dextrose 50% (D50W) injection 50 mL  50 mL       ALLERGIES:    Allergies   Allergen Reactions   • Mircera [Methoxy Polyethylene Glycol-Epoetin Beta] Hives        FAMILY HISTORY:   Family History   Problem Relation Age of Onset   • Heart Disease Mother    • Alcohol/Drug Father    • Thyroid Son    • Diabetes Brother    • Hypertension Neg Hx    • Hyperlipidemia Neg Hx          REVIEW OF SYSTEMS:   Constitutional: Positive for chills and fever.  Respiratory: Negative for cough and shortness of breath.    Cardiovascular:Negative for chest pain.  Gastrointestinal: Negative for constipation, diarrhea, nausea and vomiting.   Lower extremities: positive for swelling and redness in left great toe  Neurological: positive for numbness to feet and lower legs  All other systems reviewed and are negative     RESULTS:     Recent Labs     05/14/20  0852 05/15/20  0406   WBC 9.7 5.8   RBC 3.65* 3.23*   HEMOGLOBIN 12.0* 10.5*   HEMATOCRIT 34.1* 30.4*   MCV 93.4 94.1   MCH 32.9 32.5   MCHC 35.2 34.5   RDW 47.7 49.5   PLATELETCT 165 155*   MPV 8.8* 9.0     Recent Labs     05/14/20  0852 05/15/20  0406   SODIUM 130* 127*   POTASSIUM 4.7 3.8   CHLORIDE 91* 92*   CO2 22 23   GLUCOSE 194* 142*   BUN 44*  26*       ESR:     Results from last 7 days   Lab Units 05/14/20  0852   SED RATE WESTERGREN 1526 mm/hour 58*       CRP:       Results from last 7 days   Lab Units 05/14/20  0852   C REACTIVE PROTEIN 4596 mg/dL 2.91*       X-ray: DX-foot-complete 3+ left 5/14/2020IMPRESSION:     1. No fracture or dislocation. No cortical destruction of the great toe to suggest osteomyelitis.  2. Mild to moderate multifocal osteoarthrosis.      MRI: Completed 5/15/2020, results pending    Arterial studies:   Patient is s/p left anterior tibial artery angioplasty, Percutaneous thrombectomy of left peroneal artery, Left peroneal artery angioplasty on 4/24/2020    US-extremity artery lower bilateral 4/22/2020     RIGHT   Waveform        Peak Systolic Velocity (cm/s)                   Prox    Prox-Mid  Mid    Mid-Dist  Distal   Biphasic                          97                       CFA         Biphasic        63                                         PFA         Biphasic        79                102              70      SFA         Biphasic        65                                 59      POP         Monophasic      109               82               73      AT         Monophasic      58                                 59      PT         Not                                                        LISA   attained                    LEFT   Waveform        Peak Systolic Velocity (cm/s)                   Prox    Prox-Mid  Mid    Mid-Dist  Distal   Biphasic                          116                      CFA         Biphasic        83                                         PFA         Biphasic        77                121              78      SFA         Biphasic        87                                 78      POP         Monophasic      91       326      67               306     AT         Absent          0                                  0       PT         Not                                                        LISA    "attained            FINDINGS   Right.    Calcific plaque is seen throughout the right lower extremity. Plaque is    especially dense in the tibial and peroneal arteries.   Unable to visualize the right peroneal artery due to calcific shadowing    from the posterior and anterior tibial arteries.    No focal stenosis is seen.    Waveforms are monophasic at the distal anterior and posterior tibial    arteries.       Left.    Calcific plaque is seen throughout the right lower extremity. Plaque is    especially dense in the tibial and peroneal arteries.   Unable to visualize the right peroneal artery due to calcific shadowing    from the posterior and anterior tibial arteries.    The posterior tibial artery appears to be occluded throughout.    There are multiple tandem stenosis seen in the proximal anterior tibial    artery. All stenotic velocities are consistent with > 75%stenosis.    Stenosis of the distal anterior tibial artery. Velocities are consistent    with > 75%stenosis.    Waveforms at the distal anterior tibial artery are monophasic.    A1c:  Lab Results   Component Value Date/Time    HBA1C 5.8 (H) 04/01/2020 09:34 AM          Microbiology:  Results     Procedure Component Value Units Date/Time    BLOOD CULTURE [[id number]] Collected:  05/14/20 0852    Order Status:  Completed Specimen:  Blood from Peripheral Updated:  05/15/20 0652     Significant Indicator NEG     Source BLD     Site PERIPHERAL     Culture Result No Growth  Note: Blood cultures are incubated for 5 days and  are monitored continuously.Positive blood cultures  are called to the RN and reported as soon as  they are identified.      Narrative:       Per Hospital Policy: Only change Specimen Src: to \"Line\" if  specified by physician order.  Right Forearm/Arm    BLOOD CULTURE [[id number]] Collected:  05/14/20 0927    Order Status:  Completed Specimen:  Blood from Peripheral Updated:  05/15/20 0652     Significant Indicator NEG     Source BLD     " "Site PERIPHERAL     Culture Result No Growth  Note: Blood cultures are incubated for 5 days and  are monitored continuously.Positive blood cultures  are called to the RN and reported as soon as  they are identified.      Narrative:       Per Hospital Policy: Only change Specimen Src: to \"Line\" if  specified by physician order.  Right Hand    URINALYSIS [[id number]]  (Abnormal) Collected:  05/15/20 0048    Order Status:  Completed Specimen:  Urine Updated:  05/15/20 0103     Color Yellow     Character Clear     Specific Gravity 1.015     Ph 8.5     Glucose 250 mg/dL      Ketones Negative mg/dL      Protein 300 mg/dL      Bilirubin Negative     Urobilinogen, Urine 1.0     Nitrite Negative     Leukocyte Esterase Negative     Occult Blood Negative     Micro Urine Req Microscopic    Narrative:       Indication for culture:->Emergency Room Patient    URINE CULTURE(NEW) [[id number]] Collected:  05/15/20 0048    Order Status:  Completed Specimen:  Urine Updated:  05/15/20 0054    Narrative:       Indication for culture:->Emergency Room Patient    CULTURE TISSUE W/ GRM STAIN [[id number]]     Order Status:  Canceled Specimen:  Other Tissue     SARS-CoV-2, PCR (In-House) [[id number]] Collected:  05/14/20 0900    Order Status:  Completed Updated:  05/14/20 1002     SARS-CoV-2 Source NP Swab     SARS-CoV-2 by PCR NotDetected     Comment: [facility] providers: PLEASE REFER TO DE-ESCALATION AND RETESTING PROTOCOL  on [email or web address]  **The [employer] GeneXpert Xpress SARS-CoV-2 Test has been made available for  use under the Emergency Use Authorization (EUA) only.         COVID/SARS CoV-2 [[id number]] Collected:  05/14/20 0900    Order Status:  Completed Specimen:  Respirate from Nasopharyngeal Updated:  05/14/20 0907     COVID Order Status Received           PHYSICAL EXAMINATION:     VITAL SIGNS: /46   Pulse 86   Temp 37.3 °C (99.2 °F) (Temporal)   Resp 16   Ht 1.676 m (5' 6\")   Wt 76.3 kg (168 lb 3.4 oz)   SpO2 98%   BMI 27.15 " kg/m²       General Appearance:  Well developed, well nourished elderly male in no acute distress    Lower Extremity Assessment:    Edema:   1+ left pedal edema.     Pulses:  R foot: Palpable DP/PT pulses, monophasic DP/PT tones on doppler  L foot: palpable DP/PT pulses, monophasic DP/PT tones on doppler    ROM dorsi/plantarflexion  Bilateral ankle flexion/dorsiflexion WNL bilaterally    Structural /mechanical changes:  Flexible hammer toes to bilateral 1-2 toes    Sensory Assessment  Feet Insensate to light touch  Monofilament testing done previously 4/22/2020  1/10 bilaterally.     Wound Assessment:  Dry gangrene to left great toe at the distal tip. There is erythema swelling to dorsal side of left great toe that is limited to the toe at this time. No fluctuance, drainage, or odor. No Surrounding callus.   Measures 1.7cm x 1.8cm x UTO.         ASSESSMENT AND PLAN:   The patient has dry gangrene to the distal tip of his left great toe. He is s/p Left anterior tibial artery angioplasty, Percutaneous thrombectomy of left peroneal artery, Left peroneal artery angioplasty 4/24/2020 with Dr. Encinas for CLI. I suspect the patient's fever is due to infected diabetic foot wound. He has palpable and audible pulses but the fact that he is having recurring diabetic foot infections is concerning.  X-rays are negative for osteomyelitis but MRI has been completed for further assessment.  Results are pending.  At this time, we will initiate wound care with pain in the left great toe with Betadine twice a day as well as offloading.  The patient currently and previously has declined to wear orthotic shoes.  The patient is aware that he has hammertoes to his left great toe and that that may be the etiology of his wound on his left great toe.  Discussed the possibility of an orthopedic consult for a tenotomy the patient states he is open to do.  Will notify foot and ankle orthopedic specialist of patient after MRI results are  complete.    Wound care:   -Wound care orders placed for nursing  Paint left great toe wound with betadine twice a day.     Imaging/Labs:  -MRI done 5/15/2020, results pending.    Vascular status:   -Palpable DP/PT pulses bilaterally. Pt s/p Left anterior tibial artery angioplasty, Percutaneous thrombectomy of left peroneal artery, Left peroneal artery angioplasty 4/24/2020 with Dr. Encinas. Monophasic tones bilateral DP/PT pulses.    Surgery:   -None at this time. MRI pending    Antibiotics:   -Patient on Ceftriaxone and Vancomycin IV at this time. Managed by Hospitalist.     Weight Bearing Status:   -Weight bearing as tolerated, LLE    Offloading:   -To be determined after MRI results complete.    PT/OT:   -none at this time    Diabetes Education:   Patient has had CDE in past and Last A1c 4/1/2020 5.8. No consult placed at this time.   - Implications of loss of protective sensation (LOPS) discussed with patient- including increased risk for amputation.  Advised to check feet at least daily, moisturize feet, and to always wear protective foot wear.   -avoid trimming own nails. See podiatrist or certified foot and nail RN  -keep blood sugars <150 for improved wound healing      D/W: pt, RN, Dr. Sanchez      Discharge Plan:  Patient to remain inpatient at this time. LPS to follow      Please note that this dictation was created using voice recognition software. I have  worked with technical experts from [facility] to optimize the interface.  I have made every reasonable attempt to correct obvious errors, but there may be errors of grammar and possibly content that I did not discover before finalizing the note.

## 2020-05-15 NOTE — DISCHARGE SUMMARY
Discharge Summary    Date of Admission: 5/14/2020  Date of Discharge: No discharge date for patient encounter.  Discharging Attending: Dr. Robby M.D.   Discharging Senior Resident: Emerita Pineda M.D.  Discharging Intern: Dr. Jacobs    CHIEF COMPLAINT ON ADMISSION  Chief Complaint   Patient presents with   • Fever       Reason for Admission  Fever    Admission Date  5/14/2020    CODE STATUS  Full Code    HPI & HOSPITAL COURSE  This is a 74 y.o. male here with medical history of ESRD on hemodialysis, coronary artery disease status post stent placement, IDDM, atrial fibrillation on apixaban and hyperthyroidism presented to the emergency department for the evaluation of fever, patient had chronic ischemic ulcer on the tip of his left big toe, was evaluated during the last hospital admission and x-ray did not show any sign of osteomyelitis, patient reported increasing in the skin redness, warmth and tenderness of the left foot, on admission he had normal WBC, sed rate was 58 and CRP 2.9, x-ray of the ED did not show evidence of osteomyelitis but decided to do MRI to rule out osteomyelitis given the negative work-up for infection apart from the big toe lesion, MRI was consistent with osteomyelitis, patient was started on ceftriaxone and vancomycin on admission, infectious disease consulted after the MRI results in addition to LPS team and orthopedics, after further discussion patient underwent amputation which was done by orthopedics for source control, patient did well after the amputation, no fever or any sign of infection after 48 hours of the procedure, antibiotics was discontinued per ID recommendations, patient wound was clean without any sign of infection, patient did not need any wound care follow-up as an outpatient, he was discharged home with follow-up appointment with orthopedics in 3 weeks.    ESRD: On hemodialysis which was continued throughout the hospital admission.         Therefore, he is  discharged in good and stable condition to home with close outpatient follow-up.    The patient met 2-midnight criteria for an inpatient stay at the time of discharge.    Discharge Date  5/19/2020      FOLLOW UP ITEMS POST DISCHARGE  Wound from amputation.  ESRD.  Neuropathy and pain.    DISCHARGE DIAGNOSES  Principal Problem:    Ulcer of left great toe with superimposed cellulitis POA: Yes  Active Problems:    Peripheral vascular disease (HCC) POA: Yes    Fever, unknown origin POA: Yes    Essential hypertension POA: Yes    Anemia of chronic disease POA: Yes    Diabetes mellitus type 2 in nonobese (HCC) POA: Yes    End stage renal disease on dialysis (HCC) POA: Yes    Paroxysmal A-fib (HCC) POA: Yes    Secondary hyperparathyroidism of renal origin (HCC) POA: Yes    Chronic anticoagulation POA: Yes    Hyperthyroidism POA: Yes  Resolved Problems:    * No resolved hospital problems. *      FOLLOW UP  Future Appointments   Date Time Provider Department Center   6/2/2020  2:20 PM Kulwant Osborn M.D. [facility] None     No follow-up provider specified.  Called rescheduling, requested follow-up appointment with PCP.  MEDICATIONS ON DISCHARGE     Medication List      Ask your doctor about these medications      Instructions   atorvastatin 40 MG Tabs  Commonly known as:  LIPITOR   Take 1 Tab by mouth every bedtime.  Dose:  40 mg     calcitRIOL 0.25 MCG Caps  Commonly known as:  ROCALTROL   Take 0.25 mcg by mouth every day.  Dose:  0.25 mcg     calcium acetate 667 MG Caps  Commonly known as:  PHOS-LO   Take 1,334 mg by mouth 3 times a day, with meals.  Dose:  1,334 mg     carvedilol 3.125 MG Tabs  Commonly known as:  COREG   Take 3.125 mg by mouth 2 times a day, with meals.  Dose:  3.125 mg     clopidogrel 75 MG Tabs  Commonly known as:  PLAVIX   Take 1 Tab by mouth every day.  Dose:  75 mg     Eliquis 2.5mg Tabs  Generic drug:  apixaban   Take 2.5 mg by mouth 2 Times a Day.  Dose:  2.5 mg     Lantus SoloStar 100 UNIT/ML Sopn  injection  Generic drug:  insulin glargine   Inject 5 Units as instructed every bedtime.  Dose:  5 Units     methimazole 5 MG Tabs  Commonly known as:  TAPAZOLE   Take 1.5 tabs in am, and 1 tab in evening     Protonix 40 MG Tbec  Generic drug:  pantoprazole   Take 40 mg by mouth every bedtime.  Dose:  40 mg            Allergies  Allergies   Allergen Reactions   • Mircera [Methoxy Polyethylene Glycol-Epoetin Beta] Hives       DIET  Orders Placed This Encounter   Procedures   • Diet Order Renal     Standing Status:   Standing     Number of Occurrences:   1     Order Specific Question:   Diet:     Answer:   Renal [8]       ACTIVITY  As tolerated.  Weight bearing as tolerated    CONSULTATIONS  Orthopedics  Limb preservation team  Nephrology    PROCEDURES  Left first toe amputation  Left percutaneous flexor tendon release 2 through 5    Physical Exam  Vitals signs and nursing notes reviewed.   Constitutional:       General: He is not in acute distress.     Appearance: He is not diaphoretic.   HENT:      Head: Normocephalic and atraumatic.      Nose: No congestion.      Mouth/Throat:      Mouth: Mucous membranes are moist.      Pharynx: Oropharynx is clear. No oropharyngeal exudate.   Eyes:      Extraocular Movements: Extraocular movements intact.      Conjunctiva/sclera: Conjunctivae normal.   Neck:      Musculoskeletal: Normal range of motion and neck supple. No neck rigidity.   Cardiovascular:      Rate and Rhythm: Normal rate. Rhythm irregular.      Pulses: Normal pulses.      Heart sounds: No murmur.   Pulmonary:      Effort: Pulmonary effort is normal. No respiratory distress.      Breath sounds: Normal breath sounds. No wheezing.   Abdominal:      General: Abdomen is flat. Bowel sounds are normal.      Palpations: Abdomen is soft.      Tenderness: There is no abdominal tenderness.   Musculoskeletal:      Right lower leg: No edema.      Comments: Surgical bandage in place to L foot. Toe wiggle intact, toes 2-5. LUE  fistula with good thrill  Skin:     General: Skin is warm and dry.   Neurological:      General: No focal deficit present.      Mental Status: He is oriented to person, place, and time.      Cranial Nerves: No cranial nerve deficit.      Sensory: Sensory deficit (Bilateral loss on bottom of feet, chronic) present.      Motor: No weakness.   Psychiatric:         Mood and Affect: Mood normal.         Behavior: Behavior normal.

## 2020-05-15 NOTE — RESPIRATORY CARE
COPD EDUCATION by COPD CLINICAL EDUCATOR  5/15/2020 at 7:34 AM by Sandra Bansal, RRT     Patient reviewed by COPD education team. Patient does not have a history or diagnosis of COPD and is a non-smoker, therefore does not qualify for the COPD program.  
normal...

## 2020-05-15 NOTE — PROGRESS NOTES
Hemodialysis ordered by Dr. Tristan. Treatment started at 1459 and ended at 1759. Pt stable, vss, no c/o post tx. See flow sheets for details. Net UF 1.722 L. Reported to JULIEN Tompkins RN.

## 2020-05-15 NOTE — PROGRESS NOTES
Received report from JAN Al at 0045 and assumed care of pt. Pt A&Ox4, sitting at the edge of bed. UA collected and sent to lab. Pt states all needs are being met at this time. Pt call light within reach, personal possessions nearby, bed in low position and locked, hourly rounding in practice.

## 2020-05-16 PROBLEM — M86.9 OSTEOMYELITIS (HCC): Status: ACTIVE | Noted: 2020-05-14

## 2020-05-16 LAB
ALBUMIN SERPL BCP-MCNC: 3.3 G/DL (ref 3.2–4.9)
BASOPHILS # BLD AUTO: 0.2 % (ref 0–1.8)
BASOPHILS # BLD: 0.01 K/UL (ref 0–0.12)
BUN SERPL-MCNC: 44 MG/DL (ref 8–22)
CALCIUM SERPL-MCNC: 9.3 MG/DL (ref 8.5–10.5)
CHLORIDE SERPL-SCNC: 96 MMOL/L (ref 96–112)
CO2 SERPL-SCNC: 23 MMOL/L (ref 20–33)
CREAT SERPL-MCNC: 5.75 MG/DL (ref 0.5–1.4)
EOSINOPHIL # BLD AUTO: 0.01 K/UL (ref 0–0.51)
EOSINOPHIL NFR BLD: 0.2 % (ref 0–6.9)
ERYTHROCYTE [DISTWIDTH] IN BLOOD BY AUTOMATED COUNT: 50 FL (ref 35.9–50)
GLUCOSE BLD-MCNC: 120 MG/DL (ref 65–99)
GLUCOSE BLD-MCNC: 133 MG/DL (ref 65–99)
GLUCOSE BLD-MCNC: 159 MG/DL (ref 65–99)
GLUCOSE BLD-MCNC: 227 MG/DL (ref 65–99)
GLUCOSE SERPL-MCNC: 141 MG/DL (ref 65–99)
HCT VFR BLD AUTO: 29.5 % (ref 42–52)
HGB BLD-MCNC: 9.8 G/DL (ref 14–18)
IMM GRANULOCYTES # BLD AUTO: 0.02 K/UL (ref 0–0.11)
IMM GRANULOCYTES NFR BLD AUTO: 0.4 % (ref 0–0.9)
LYMPHOCYTES # BLD AUTO: 1.19 K/UL (ref 1–4.8)
LYMPHOCYTES NFR BLD: 22.8 % (ref 22–41)
MCH RBC QN AUTO: 31.7 PG (ref 27–33)
MCHC RBC AUTO-ENTMCNC: 33.2 G/DL (ref 33.7–35.3)
MCV RBC AUTO: 95.5 FL (ref 81.4–97.8)
MONOCYTES # BLD AUTO: 0.82 K/UL (ref 0–0.85)
MONOCYTES NFR BLD AUTO: 15.7 % (ref 0–13.4)
NEUTROPHILS # BLD AUTO: 3.17 K/UL (ref 1.82–7.42)
NEUTROPHILS NFR BLD: 60.7 % (ref 44–72)
NRBC # BLD AUTO: 0 K/UL
NRBC BLD-RTO: 0 /100 WBC
PHOSPHATE SERPL-MCNC: 3.8 MG/DL (ref 2.5–4.5)
PLATELET # BLD AUTO: 150 K/UL (ref 164–446)
PMV BLD AUTO: 9.1 FL (ref 9–12.9)
POTASSIUM SERPL-SCNC: 4.3 MMOL/L (ref 3.6–5.5)
RBC # BLD AUTO: 3.09 M/UL (ref 4.7–6.1)
SODIUM SERPL-SCNC: 135 MMOL/L (ref 135–145)
WBC # BLD AUTO: 5.2 K/UL (ref 4.8–10.8)

## 2020-05-16 PROCEDURE — 99223 1ST HOSP IP/OBS HIGH 75: CPT | Performed by: INTERNAL MEDICINE

## 2020-05-16 PROCEDURE — 80069 RENAL FUNCTION PANEL: CPT

## 2020-05-16 PROCEDURE — 85025 COMPLETE CBC W/AUTO DIFF WBC: CPT

## 2020-05-16 PROCEDURE — 5A1D70Z PERFORMANCE OF URINARY FILTRATION, INTERMITTENT, LESS THAN 6 HOURS PER DAY: ICD-10-PCS | Performed by: INTERNAL MEDICINE

## 2020-05-16 PROCEDURE — 82962 GLUCOSE BLOOD TEST: CPT | Mod: 91

## 2020-05-16 PROCEDURE — 700102 HCHG RX REV CODE 250 W/ 637 OVERRIDE(OP): Performed by: STUDENT IN AN ORGANIZED HEALTH CARE EDUCATION/TRAINING PROGRAM

## 2020-05-16 PROCEDURE — 770006 HCHG ROOM/CARE - MED/SURG/GYN SEMI*

## 2020-05-16 PROCEDURE — 700105 HCHG RX REV CODE 258: Performed by: STUDENT IN AN ORGANIZED HEALTH CARE EDUCATION/TRAINING PROGRAM

## 2020-05-16 PROCEDURE — A9270 NON-COVERED ITEM OR SERVICE: HCPCS | Performed by: STUDENT IN AN ORGANIZED HEALTH CARE EDUCATION/TRAINING PROGRAM

## 2020-05-16 PROCEDURE — 700102 HCHG RX REV CODE 250 W/ 637 OVERRIDE(OP): Performed by: HOSPITALIST

## 2020-05-16 PROCEDURE — 700111 HCHG RX REV CODE 636 W/ 250 OVERRIDE (IP): Performed by: STUDENT IN AN ORGANIZED HEALTH CARE EDUCATION/TRAINING PROGRAM

## 2020-05-16 PROCEDURE — 90935 HEMODIALYSIS ONE EVALUATION: CPT

## 2020-05-16 PROCEDURE — 700111 HCHG RX REV CODE 636 W/ 250 OVERRIDE (IP)

## 2020-05-16 PROCEDURE — 99233 SBSQ HOSP IP/OBS HIGH 50: CPT | Mod: GC | Performed by: INTERNAL MEDICINE

## 2020-05-16 RX ORDER — HEPARIN SODIUM 1000 [USP'U]/ML
INJECTION, SOLUTION INTRAVENOUS; SUBCUTANEOUS
Status: COMPLETED
Start: 2020-05-16 | End: 2020-05-16

## 2020-05-16 RX ADMIN — CARVEDILOL 3.12 MG: 3.12 TABLET, FILM COATED ORAL at 20:54

## 2020-05-16 RX ADMIN — METHIMAZOLE 5 MG: 5 TABLET ORAL at 17:00

## 2020-05-16 RX ADMIN — HEPARIN SODIUM 1500 UNITS: 1000 INJECTION, SOLUTION INTRAVENOUS; SUBCUTANEOUS at 10:07

## 2020-05-16 RX ADMIN — HEPARIN SODIUM 3700 UNITS: 1000 INJECTION, SOLUTION INTRAVENOUS; SUBCUTANEOUS at 13:30

## 2020-05-16 RX ADMIN — Medication 1334 MG: at 08:00

## 2020-05-16 RX ADMIN — ACETAMINOPHEN 650 MG: 325 TABLET, FILM COATED ORAL at 20:57

## 2020-05-16 RX ADMIN — Medication 1334 MG: at 17:00

## 2020-05-16 RX ADMIN — CARVEDILOL 3.12 MG: 3.12 TABLET, FILM COATED ORAL at 13:53

## 2020-05-16 RX ADMIN — INSULIN LISPRO 2 UNITS: 100 INJECTION, SOLUTION INTRAVENOUS; SUBCUTANEOUS at 17:39

## 2020-05-16 RX ADMIN — OMEPRAZOLE 20 MG: 20 CAPSULE, DELAYED RELEASE ORAL at 20:54

## 2020-05-16 RX ADMIN — ATORVASTATIN CALCIUM 40 MG: 40 TABLET, FILM COATED ORAL at 20:54

## 2020-05-16 RX ADMIN — Medication 1334 MG: at 13:54

## 2020-05-16 RX ADMIN — CLOPIDOGREL BISULFATE 75 MG: 75 TABLET ORAL at 05:20

## 2020-05-16 RX ADMIN — INSULIN LISPRO 1 UNITS: 100 INJECTION, SOLUTION INTRAVENOUS; SUBCUTANEOUS at 14:00

## 2020-05-16 RX ADMIN — CALCITRIOL CAPSULES 0.25 MCG 0.25 MCG: 0.25 CAPSULE ORAL at 05:20

## 2020-05-16 RX ADMIN — APIXABAN 2.5 MG: 2.5 TABLET, FILM COATED ORAL at 05:20

## 2020-05-16 RX ADMIN — INSULIN GLARGINE 5 UNITS: 100 INJECTION, SOLUTION SUBCUTANEOUS at 17:41

## 2020-05-16 RX ADMIN — CEFTRIAXONE SODIUM 2 G: 2 INJECTION, POWDER, FOR SOLUTION INTRAMUSCULAR; INTRAVENOUS at 17:00

## 2020-05-16 ASSESSMENT — ENCOUNTER SYMPTOMS
DIARRHEA: 0
NAUSEA: 1
NECK PAIN: 0
PHOTOPHOBIA: 0
BLOOD IN STOOL: 0
MYALGIAS: 1
BRUISES/BLEEDS EASILY: 0
COUGH: 0
SPEECH CHANGE: 0
FALLS: 0
NAUSEA: 0
WEIGHT LOSS: 0
ABDOMINAL PAIN: 0
TINGLING: 0
DOUBLE VISION: 0
DIZZINESS: 0
MYALGIAS: 0
VOMITING: 0
HEADACHES: 0
FLANK PAIN: 0
SORE THROAT: 0
CONSTIPATION: 0
SHORTNESS OF BREATH: 0
CLAUDICATION: 0
SINUS PAIN: 0
DIAPHORESIS: 0
BLURRED VISION: 0
SPUTUM PRODUCTION: 0
HEMOPTYSIS: 0
CHILLS: 0
FOCAL WEAKNESS: 0
FEVER: 1
SENSORY CHANGE: 1
PALPITATIONS: 0
FEVER: 0
DEPRESSION: 0
NERVOUS/ANXIOUS: 0

## 2020-05-16 ASSESSMENT — CHA2DS2 SCORE
SEX: MALE
AGE 75 OR GREATER: YES
HYPERTENSION: YES
PRIOR STROKE OR TIA OR THROMBOEMBOLISM: NO
CHF OR LEFT VENTRICULAR DYSFUNCTION: YES
DIABETES: YES
VASCULAR DISEASE: YES
CHA2DS2 VASC SCORE: 6
AGE 65 TO 74: NO

## 2020-05-16 ASSESSMENT — LIFESTYLE VARIABLES: SUBSTANCE_ABUSE: 0

## 2020-05-16 ASSESSMENT — FIBROSIS 4 INDEX: FIB4 SCORE: 2.49

## 2020-05-16 NOTE — CONSULTS
INFECTIOUS DISEASES INPATIENT CONSULT NOTE     Date of Service: 5/16/2020    Consult Requested By: Schuyler Watts M.D.    Reason for Consultation: Left great toe osteomyelitis    History of Present Illness:   Luis Sepulveda is a 74 y.o. man with history of end-stage renal disease on hemodialysis, type 2 diabetes mellitus, atrial fibrillation on apixaban, coronary artery disease and hypertension admitted 5/14/2020 for worsening left great toe swelling and pain.  Patient was recently hospitalized from 4/21/2020-4/25/2020 for a left great toe ulcer.  At that time, x-ray was negative for osteomyelitis.  Arterial duplex of the foot was done and showed occlusion of the left posterior tibial artery and stenosis in the left proximal anterior tibial artery.  As such, vascular surgery was consulted and the patient underwent a left lower extremity angiogram with angioplasty and thrombectomy on 04/24/2020.  He was not discharged on any antibiotics at that time.  Patient states the ulcer on the distal tip of the left great toe was quite small but eventually increased in size.  He noted increasing pain, swelling and some erythema.  He initially saw his podiatrist who stated that he needed to go to the hospital for further evaluation.  During this time, patient states his fevers were as high as 101.4.  Patient denies any nausea, vomiting, or diarrhea.  No dysuria.  No pain or tenderness around his permacath site.  No cough or shortness of breath.  On presentation, he was afebrile with a normal white count.  MRI shows osteomyelitis of the distal aspect of the first distal phalanx.  He has been evaluated by the limb preservation service.  Patient is currently on vancomycin and ceftriaxone.  Infectious disease service consulted for further antibiotic recommendations and management.      Review of Systems   Constitutional: Positive for fever. Negative for chills.   Respiratory: Negative for cough and shortness of breath.     Gastrointestinal: Negative for abdominal pain, diarrhea, nausea and vomiting.   Musculoskeletal: Positive for myalgias.   Neurological: Negative for dizziness and headaches.   All other systems reviewed and are negative.      PMH:   Past Medical History:   Diagnosis Date   • CAD (coronary artery disease)     stents   • Chronic kidney disease (CKD), stage V (HCC)    • Diabetes    • Hypertension    • Osteoarthritis    • Peripheral neuropathy    Recent left great toe ulcer    PSH:  Past Surgical History:   Procedure Laterality Date   • APPENDECTOMY     • OTHER CARDIAC SURGERY      stents x1   • OTHER ORTHOPEDIC SURGERY      knee surgery       FAMILY HX:  Family History   Problem Relation Age of Onset   • Heart Disease Mother    • Alcohol/Drug Father    • Thyroid Son    • Diabetes Brother    • Hypertension Neg Hx    • Hyperlipidemia Neg Hx        SOCIAL HX:  Social History     Socioeconomic History   • Marital status:      Spouse name: Not on file   • Number of children: Not on file   • Years of education: Not on file   • Highest education level: Not on file   Occupational History   • Not on file   Social Needs   • Financial resource strain: Not hard at all   • Food insecurity     Worry: Never true     Inability: Never true   • Transportation needs     Medical: No     Non-medical: No   Tobacco Use   • Smoking status: Former Smoker     Packs/day: 1.00     Years: 55.00     Pack years: 55.00     Types: Cigarettes     Last attempt to quit: 2014     Years since quittin.3   • Smokeless tobacco: Never Used   Substance and Sexual Activity   • Alcohol use: No   • Drug use: No   • Sexual activity: Not on file   Lifestyle   • Physical activity     Days per week: Not on file     Minutes per session: Not on file   • Stress: Not on file   Relationships   • Social connections     Talks on phone: Not on file     Gets together: Not on file     Attends [employer] service: Not on file     Active member of club or  organization: Not on file     Attends meetings of clubs or organizations: Not on file     Relationship status: Not on file   • Intimate partner violence     Fear of current or ex partner: Not on file     Emotionally abused: Not on file     Physically abused: Not on file     Forced sexual activity: Not on file   Other Topics Concern   • Not on file   Social History Narrative   • Not on file     Social History     Tobacco Use   Smoking Status Former Smoker   • Packs/day: 1.00   • Years: 55.00   • Pack years: 55.00   • Types: Cigarettes   • Last attempt to quit: 2014   • Years since quittin.3   Smokeless Tobacco Never Used     Social History     Substance and Sexual Activity   Alcohol Use No       Allergies/Intolerances:  Allergies   Allergen Reactions   • Mircera [Methoxy Polyethylene Glycol-Epoetin Beta] Hives       History reviewed with the patient     Other Current Medications:    Current Facility-Administered Medications:   •  cefTRIAXone (ROCEPHIN) 2 g in  mL IVPB, 2 g, Intravenous, Q24HRS, Leobardo Sanchez M.D., Stopped at 05/15/20 1734  •  acetaminophen (TYLENOL) tablet 650 mg, 650 mg, Oral, Q6HRS PRN, Leobardo Sanchez M.D., 650 mg at 05/15/20 2024  •  apixaban (ELIQUIS) tablet 2.5 mg, 2.5 mg, Oral, BID, Leobardo Sanchez M.D., 2.5 mg at 20  •  atorvastatin (LIPITOR) tablet 40 mg, 40 mg, Oral, QHS, Leobardo Sanchez M.D., 40 mg at 05/15/20 2024  •  calcitRIOL (ROCALTROL) capsule 0.25 mcg, 0.25 mcg, Oral, DAILY, Leobardo Sanchez M.D., 0.25 mcg at 20 05  •  calcium acetate (PHOS-LO) 667 MG tablet 1,334 mg, 1,334 mg, Oral, TID WITH MEALS, Leobardo Sanchez M.D., 1,334 mg at 20 0800  •  carvedilol (COREG) tablet 3.125 mg, 3.125 mg, Oral, BID WITH MEALS, Leobardo Sanchez M.D., Stopped at 20 0800  •  clopidogrel (PLAVIX) tablet 75 mg, 75 mg, Oral, DAILY, Leobardo Sanchez M.D., 75 mg at 20 0520  •  methimazole (TAPAZOLE) tablet 7.5 mg, 7.5 mg, Oral, Leobardo MUHAMMAD  "TK Sanchez, Stopped at 20 0600  •  methimazole (TAPAZOLE) tablet 5 mg, 5 mg, Oral, Q EVENING, Leobardo Sanchez M.D., 5 mg at 05/15/20 1705  •  omeprazole (PRILOSEC) capsule 20 mg, 20 mg, Oral, QHS, Leobardo Sanchez M.D., 20 mg at 05/15/20 202  •  MD Alert...Vancomycin per Pharmacy, , Other, PHARMACY TO DOSE, Leobardo Sanchez M.D.  •  heparin injection 3,700 Units, 3,700 Units, Intravenous, DIALYSIS PRN, Fadi Najjar, M.D., 3,700 Units at 20 1748  •  heparin injection 1,500 Units, 1,500 Units, Intracatheter, DIALYSIS PRN, [place] Tristan M.D., 1,500 Units at 20 1007  •  insulin glargine (LANTUS) injection 5 Units, 5 Units, Subcutaneous, Q EVENING, 5 Units at 05/15/20 1709 **AND** insulin lispro (HUMALOG) injection 1-6 Units, 1-6 Units, Subcutaneous, 4X/DAY ACHS, Stopped at 20 0800 **AND** POC Blood Glucose, , , Q6H **AND** NOTIFY MD and PharmD, , , Once **AND** glucose 4 g chewable tablet 16 g, 16 g, Oral, Q15 MIN PRN **AND** dextrose 50% (D50W) injection 50 mL, 50 mL, Intravenous, Q15 MIN PRN, Vicky Middleton M.D.  [unfilled]    Most Recent Vital Signs:  /56   Pulse 80   Temp 36.6 °C (97.9 °F) (Temporal)   Resp 18   Ht 1.676 m (5' 6\")   Wt 77.3 kg (170 lb 6.7 oz)   SpO2 99%   BMI 27.51 kg/m²   Temp  Av.2 °C (99 °F)  Min: 36.6 °C (97.8 °F)  Max: 37.7 °C (99.8 °F)    Physical Exam:  General: well nourished, no diaphoresis, well-appearing, no acute distress  HEENT: sclera anicteric, PERRL, extraocular muscles intact, mucous membranes moist, oropharynx clear and moist, no oral lesions or exudate  Neck: supple, no lymphadenopathy  Chest: CTAB, no rales, rhonchi or wheezes, normal work of breathing.  Right upper chest permacath-nontender, no surrounding erythema  Cardiac: Irregularly irregular, normal rate normal S1 S2, no murmurs, rubs or gallops  Abdomen: + bowel sounds, soft, non-tender, non-distended, no hepatosplenomegaly  Extremities: Evidence of eschar on distal " "tip of left great toe.  There is no active drainage.  There is however some mild surrounding erythema and tenderness to palpation that extends proximally  Skin: warm and dry, no rashes or worrisome lesions  Neuro: Alert and oriented times 3, non-focal exam, speech fluent, full range of motion to bilateral upper and lower extremities  Psych: normal mood and behavior, pleasant; memory intact, normal judgement    Pertinent Lab Results:  Recent Labs     05/14/20 0852 05/15/20  0406 05/16/20  0245   WBC 9.7 5.8 5.2      Recent Labs     05/14/20  0852 05/15/20  0406 05/16/20  0245   HEMOGLOBIN 12.0* 10.5* 9.8*   HEMATOCRIT 34.1* 30.4* 29.5*   MCV 93.4 94.1 95.5   MCH 32.9 32.5 31.7   PLATELETCT 165 155* 150*         Recent Labs     05/14/20  0852 05/15/20  0406 05/16/20  0245   SODIUM 130* 127* 135   POTASSIUM 4.7 3.8 4.3   CHLORIDE 91* 92* 96   CO2 22 23 23   CREATININE 6.05* 3.99* 5.75*        Recent Labs     05/14/20  0852 05/15/20  0406 05/16/20  0245   ALBUMIN 4.0 3.5 3.3        Pertinent Micro:  Results     Procedure Component Value Units Date/Time    BLOOD CULTURE [[id number]] Collected:  05/14/20 0852    Order Status:  Completed Specimen:  Blood from Peripheral Updated:  05/15/20 0652     Significant Indicator NEG     Source BLD     Site PERIPHERAL     Culture Result No Growth  Note: Blood cultures are incubated for 5 days and  are monitored continuously.Positive blood cultures  are called to the RN and reported as soon as  they are identified.      Narrative:       Per Hospital Policy: Only change Specimen Src: to \"Line\" if  specified by physician order.  Right Forearm/Arm    BLOOD CULTURE [[id number]] Collected:  05/14/20 0927    Order Status:  Completed Specimen:  Blood from Peripheral Updated:  05/15/20 0652     Significant Indicator NEG     Source BLD     Site PERIPHERAL     Culture Result No Growth  Note: Blood cultures are incubated for 5 days and  are monitored continuously.Positive blood cultures  are " "called to the RN and reported as soon as  they are identified.      Narrative:       Per Hospital Policy: Only change Specimen Src: to \"Line\" if  specified by physician order.  Right Hand    URINALYSIS [[id number]]  (Abnormal) Collected:  05/15/20 0048    Order Status:  Completed Specimen:  Urine Updated:  05/15/20 0103     Color Yellow     Character Clear     Specific Gravity 1.015     Ph 8.5     Glucose 250 mg/dL      Ketones Negative mg/dL      Protein 300 mg/dL      Bilirubin Negative     Urobilinogen, Urine 1.0     Nitrite Negative     Leukocyte Esterase Negative     Occult Blood Negative     Micro Urine Req Microscopic    Narrative:       Indication for culture:->Emergency Room Patient    URINE CULTURE(NEW) [[id number]] Collected:  05/15/20 0048    Order Status:  Completed Specimen:  Urine Updated:  05/15/20 0054    Narrative:       Indication for culture:->Emergency Room Patient    CULTURE TISSUE W/ GRM STAIN [[id number]]     Order Status:  Canceled Specimen:  Other Tissue     SARS-CoV-2, PCR (In-House) [[id number]] Collected:  05/14/20 0900    Order Status:  Completed Updated:  05/14/20 1002     SARS-CoV-2 Source NP Swab     SARS-CoV-2 by PCR NotDetected     Comment: [facility] providers: PLEASE REFER TO DE-ESCALATION AND RETESTING PROTOCOL  on [email or web address]  **The [employer] GeneXpert Xpress SARS-CoV-2 Test has been made available for  use under the Emergency Use Authorization (EUA) only.         COVID/SARS CoV-2 [[id number]] Collected:  05/14/20 0900    Order Status:  Completed Specimen:  Respirate from Nasopharyngeal Updated:  05/14/20 0907     COVID Order Status Received        Blood Culture   Date Value Ref Range Status   01/24/2014   Final    Blood culture testing and Gram stain, if indicated, are  performed at [facility], [address] [place] [address].  Positive blood cultures are  sent to [facility], [address] [place] [address], for organism " identification and  susceptibility testing.  No growth after 5 days of incubation.     Blood Culture Hold   Date Value Ref Range Status   11/28/2019 Collected  Final        Studies:  Dx-chest-portable (1 View)    Result Date: 5/14/2020 5/14/2020 9:00 AM HISTORY/REASON FOR EXAM:  Sepsis. TECHNIQUE/EXAM DESCRIPTION AND NUMBER OF VIEWS: Single portable view of the chest. COMPARISON: 2/17/2020 FINDINGS: LUNGS: Chronic scarring in the left lung base. No new consolidation. No effusions. PNEUMOTHORAX: None. LINES AND TUBES: Stable right IJ central catheter position. MEDIASTINUM: No cardiomegaly. MUSCULOSKELETAL STRUCTURES: No acute displaced fracture.     Chronic scarring in the left lung base. No new consolidation of pleural effusions.    Dx-foot-2- Left    Result Date: 4/21/2020 4/21/2020 7:36 PM HISTORY/REASON FOR EXAM:  diabetic foot ulcer Left great toe ulcer TECHNIQUE/EXAM DESCRIPTION AND NUMBER OF VIEWS: 2 views of the LEFT foot. COMPARISON:  None. FINDINGS: There is soft tissue gas at the tip of the great toe and in the deep soft tissues consistent with known ulcer. There are no destructive changes to diagnose osteomyelitis. There is no fracture. Alignment is normal. Midfoot and forefoot degenerative changes are present. There is calcification within the distal achilles tendon. There is extensive atherosclerotic plaque     1.  No radiographic evidence for osteomyelitis 2.  Gas within the soft tissues of the 1st digit consistent with known open ulcer 3.  osteoarthritis of the midfoot and forefoot 4.  Extensive atherosclerotic plaque    Dx-foot-complete 3+ Left    Result Date: 5/14/2020 5/14/2020 9:00 AM HISTORY/REASON FOR EXAM:  Left foot pain TECHNIQUE/EXAM DESCRIPTION AND NUMBER OF VIEWS: 3 views of the LEFT foot. COMPARISON:  4/21/2020. FINDINGS: BONE MINERALIZATION: Decreased. JOINTS: Mild to moderate multifocal osteoarthrosis. No erosions. FRACTURE: None. DISLOCATION: None. SOFT TISSUES: Soft tissue  ulceration on the great toe.. Atherosclerosis. Plantar calcaneal spur.     1. No fracture or dislocation. No cortical destruction of the great toe to suggest osteomyelitis. 2. Mild to moderate multifocal osteoarthrosis.    Mr-foot-w/o Left    Result Date: 5/15/2020  5/15/2020 12:19 PM HISTORY/REASON FOR EXAM:  Osteomyelitis suspected, diabetic; Frequent admissions for left great toe ulcer TECHNIQUE/EXAM DESCRIPTION: MRI of the LEFT foot without contrast. The study was performed on a Justylea 1.5 Gayle MRI scanner. T1 axial and sagittal, fast spin-echo T2 fat-suppressed axial and coronal, and fast inversion recovery sagittal images were obtained. COMPARISON:  X-ray 2020 FINDINGS: There is increased T2 and confluent decreased T1 signal in the distal aspect of the first distal phalanx, consistent with osteomyelitis. There is an overlying soft tissue wound. There are claw toe deformities. There is degenerative marrow edema in the midfoot, mild. There is edema in the intrinsic musculature of the foot. There is subcutaneous edema, most pronounced in the dorsum of the forefoot. The visualized flexor and extensor tendons are intact.     Abnormal signal in the distal aspect of the first distal phalanx is consistent with osteomyelitis. Overlying soft tissue wound.    Us-zia Single Level Bilat    Result Date: 2020   Vascular Laboratory  Conclusions  There is no evidence of major arterial disease demonstrated.  KLARISSA BRADSHAW  Age:    74    Gender:     M  MRN:    [id number]  :    1945      BSA:  Exam Date:     2020 17:00  Room #:     Inpatient  Priority:     Stat  Ht (in):             Wt (lb):  Ordering Physician:        MILADYS YARBROUGH  Referring Physician:       [id number] Lizarraga  Sonographer:               Arlene Vivas RVT  Study Type:                Complete Bilateral  Technical Quality:         Adequate  Indications:     Ulcer of other part of foot (toes)  CPT  Codes:       94229  ICD Codes:       707.15  History:         Ulcer of left great toe for 3 days or more. Diabetes                   mellitus. No prior exam.  Limitations:     New fistula in left arm.                 RIGHT  Waveform            Systolic BPs (mmHg)                             148           Brachial  Triphasic                                Common Femoral  Monophasic                 0             Posterior Tibial  Monophasic                 171           Dorsalis Pedis                                           Peroneal                             1.16          PATSY                                           TBI                       LEFT  Waveform        Systolic BPs (mmHg)                                           Brachial  Triphasic                                Common Femoral  Hyperemic                  141           Posterior Tibial  Hyperemic                  167           Dorsalis Pedis                                           Peroneal                             1.13          PATSY                                           TBI  Findings  Right.  Evidence of calcified arteries on the left side make assessment unreliable.  Doppler waveforms of the common femoral and popliteal arteries are of high  amplitude and triphasic.  Doppler waveforms of the posterior tibial and dorsalis pedis arteries are  monophasic.  No toe brachial index can be obtained due to no flow detected by  photoplethysmographpy.  Left.  Ankle brachial index is within normal limits.  Doppler waveforms of the common femoral and popliteal arteries are of high  amplitude and triphasic.  Doppler waveforms of the posterior tibial and dorsalis pedis arteries are  hyperemic.  No toe brachial index can be obtained due to no flow detected by  photoplethysmographpy.  Additional testing was not performed.  William Scott MD  (Electronically Signed)  Final Date:      21 April 2020                   17:39    Us-extremity Artery Lower  Bilat    Result Date: 2020  Lower Extremity  Arterial Duplex Report  Vascular Laboratory  CONCLUSIONS  There is calcific plaque seen throughout the right lower extremity.  Plaque is especially dense in the right tibial and peroneal arteries.  Unable to visualize the right peroneal artery due to calcific shadowing  from the posterior and anterior tibial arteries.  No focal stenosis is seen in right lower extremity arterial system although  there is suspicion for small vessel disease.  The left posterior tibial artery appears to be occluded throughout.  There are multiple tandem stenosis seen in the left proximal anterior  tibial artery. All stenotic velocities are consistent with > 75%stenosis.  Stenosis of the left distal anterior tibial artery seen. Velocities are  consistent with > 75%stenosis.  KLARISSA BRADSHAW  Exam Date:     2020 12:57  Room #:     Inpatient  Priority:     Routine  Ht (in):             Wt (lb):  Ordering Physician:        GINGER FRAZIER  Referring Physician:       [id number] Miguel  Sonographer:               Patricia Wheatley RVT  Study Type:                Complete Bilateral  Technical Quality:         Adequate  Age:    74    Gender:     M  MRN:    [id number]  :    1945      BSA:  Indications:     Atherosclerosis of native arteries of left leg with                   ulceration of unspecified site  CPT Codes:       20506  ICD Codes:       I70.249  History:         Atherosclerosis with wound on the left great toe. PATSY done                   20.  Limitations:                RIGHT  Waveform        Peak Systolic Velocity (cm/s)                  Prox    Prox-Mid  Mid    Mid-Dist  Distal  Biphasic                          97                       CFA  Biphasic        63                                         PFA  Biphasic        79                102              70      SFA  Biphasic        65                                  59      POP  Monophasic      109               82               73      AT  Monophasic      58                                 59      PT  Not                                                        LISA  attained                LEFT  Waveform        Peak Systolic Velocity (cm/s)                  Prox    Prox-Mid  Mid    Mid-Dist  Distal  Biphasic                          116                      CFA  Biphasic        83                                         PFA  Biphasic        77                121              78      SFA  Biphasic        87                                 78      POP  Monophasic      91       326      67               306     AT  Absent          0                                  0       PT  Not                                                        LISA  attained  FINDINGS  Right.  Calcific plaque is seen throughout the right lower extremity. Plaque is  especially dense in the tibial and peroneal arteries.  Unable to visualize the right peroneal artery due to calcific shadowing  from the posterior and anterior tibial arteries.  No focal stenosis is seen.  Waveforms are monophasic at the distal anterior and posterior tibial  arteries.  Left.  Calcific plaque is seen throughout the right lower extremity. Plaque is  especially dense in the tibial and peroneal arteries.  Unable to visualize the right peroneal artery due to calcific shadowing  from the posterior and anterior tibial arteries.  The posterior tibial artery appears to be occluded throughout.  There are multiple tandem stenosis seen in the proximal anterior tibial  artery. All stenotic velocities are consistent with > 75%stenosis.  Stenosis of the distal anterior tibial artery. Velocities are consistent  with > 75%stenosis.  Waveforms at the distal anterior tibial artery are monophasic.  Danny RODRIGUEZ To  (Electronically Signed)  Final Date:      22 April 2020                   14:06    Us-extremity Venous Lower Unilat  Left    Result Date: 2020   Vascular Laboratory  CONCLUSIONS  No e/o LLE DVT.  SQ edema  KLARISSA BRADSHAW  Exam Date:     2020 04:02  Room #:     Inpatient  Priority:     Routine  Ht (in):             Wt (lb):  Ordering Physician:        FLOR SALEH  Referring Physician:       [id number]  Sonographer:               Markos Aguila RDMS  Study Type:                Complete Unilateral  Technical Quality:         Adequate  Age:    74    Gender:     M  MRN:    [id number]  :    1945      BSA:  Indications:     Localized swelling, mass and lump, left lower limb  CPT Codes:       09502  ICD Codes:       R22.42  History:         No Previous LEV, Left lower extremity swelling/edema  Limitations:  PROCEDURES:  Left lower extremity venous duplex imaging.  The following venous structures were evaluated: common femoral vein,  profunda vein, proximal portion of the greater saphenous vein, superficial  femoral vein, and the popliteal vein.  In addition, the posterior tibial and peroneal veins were evaluated.  Serial compression, augmentation maneuvers, and spectral Doppler flow  evaluation were performed.  FINDINGS:  Left lower extremity -.  The veins of the left lower extremity are readily compressible with normal  venous flow dynamics including spontaneous flow, respiratory phasic  variation and augmentation.  There is subcutaneous left lower extremity edema noted.  Contralateral flow was obtained in the right common femoral vein and  appears to be normal.  No evidence of deep vein thrombosis or superficial thrombophlebitis in the  left lower extremity.  Mario Chan MD  (Electronically Signed)  Final Date:      2020                   06:11    Ec-echocardiogram Complete W/o Cont    Result Date: 2020  Transthoracic Echo Report Echocardiography Laboratory CONCLUSIONS Prior echo done on 2019. Mildly reduced left ventricular systolic function. Left ventricular ejection fraction is visually  estimated to be 45%. No evidence of valvular abnormality based on Doppler evaluation. Compared to the images of the prior study done -  there has been no significant change. KLARISSA BRADSHAW Exam Date:         2020                    02:00 Exam Location:     Inpatient Priority:          Routine Ordering Physician:        FLORI COLON Referring Physician:       [id number] Dougherty Sonographer:               MEAGAN Woodruff Age:    74     Gender:    M MRN:    [id number] :    1945 BSA:    1.82   Ht (in):    65     Wt (lb):    165 Exam Type:     Complete Indications:     Encounter for screening for cardiovascular disorders ICD Codes:       Z13.6 CPT Codes:       88922 BP:   162    /   54     HR:   85 Technical Quality:       Fair MEASUREMENTS  (Male / Female) Normal Values 2D ECHO LV Diastolic Diameter PLAX        5.3 cm                4.2 - 5.9 / 3.9 - 5.3 cm LV Systolic Diameter PLAX         3.8 cm                2.1 - 4.0 cm IVS Diastolic Thickness           1.1 cm                LVPW Diastolic Thickness          1.1 cm                LVOT Diameter                     2 cm                  Estimated LV Ejection Fraction    45 %                  LV Ejection Fraction MOD BP       41.3 %                >= 55  % LV Ejection Fraction MOD 4C       35.1 %                LV Ejection Fraction MOD 2C       53.2 %                IVC Diameter                      2.1 cm                DOPPLER AV Peak Velocity                  1.5 m/s               AV Peak Gradient                  8.9 mmHg              AV Mean Gradient                  5.7 mmHg              LVOT Peak Velocity                0.96 m/s              AV Area Cont Eq vti               1.8 cm2               Mitral E Point Velocity           0.77 m/s              Mitral E to A Ratio               0.95                  MV Pressure Half Time             51 ms                 MV Area PHT                       4.3 cm2               MV Deceleration Time               176 ms                * Indicates values subject to auto-interpretation LV EF:  45    % FINDINGS Left Ventricle Normal left ventricular chamber size. Mild concentric left ventricular hypertrophy. Mildly reduced left ventricular systolic function. Left ventricular ejection fraction is visually estimated to be 45%. Normal regional wall motion. Normal diastolic function. Right Ventricle Normal right ventricular size. Normal right ventricular systolic function. Right Atrium Normal right atrial size. Normal inferior vena cava size and inspiratory collapse. Left Atrium Normal left atrial size. Left atrial volume index is 28 mL/sq m. Mitral Valve Structurally normal mitral valve. No mitral stenosis. No mitral regurgitation. Aortic Valve Structurally normal aortic valve. No aortic stenosis. No aortic insufficiency. Tricuspid Valve Structurally normal tricuspid valve. No tricuspid stenosis. Trace tricuspid regurgitation. Right atrial pressure is estimated to be 3 mmHg. Unable to estimate pulmonary artery pressure due to an inadequate tricuspid regurgitant jet. Pulmonic Valve Structurally normal pulmonic valve. No pulmonic stenosis. No pulmonic insufficiency. Pericardium No pericardial effusion seen. Aorta Normal aortic root for body surface area. Ascending aorta diameter is 2.9 cm. Danny Ureña (Electronically Signed) Final Date:     22 April 2020                 09:47    Us-thyroid    Result Date: 5/11/2020 5/11/2020 11:08 AM HISTORY/REASON FOR EXAM:  Hyperthyroid, follow-up TECHNIQUE/EXAM DESCRIPTION: Ultrasound of the soft tissues of the head and neck. COMPARISON:  Thyroid ultrasound 8/14/2018 FINDINGS: The thyroid gland is heterogeneous. Vascularity is normal. The right lobe of the thyroid gland measures 4.91 x 1.7 x 1.8 cm. The left lobe of the thyroid gland measures 3.89 x 1.59 x 1.43 cm. The isthmus measures 0.62 cm. Nodules >= 1cm: 1 Nodule #1 Location:  Right  upper Size:  1.37 x 1.07 x 0.64 cm Composition:   "Solid-2 Echogenicity:  Hypoechoic-2 Shape:  Wider than tall-0 Margins:  Smooth-0 Echogenic Foci:  None-0 ACR TIRADS points/category:  4 - TR4 - Moderately Suspicious     1.  Unchanged solid right upper pole thyroid nodule (measurements are different on the current and prior examination but remain measurements by myself confirm unchanged in size) 2.  This nodule was previously biopsied and showed \"atypia of unknown significance\" ACR TI-RADS Recommendations TR4 - FNA recommended although whether FNA should be repeated depends upon oncologic surgeon's input Recommendations based on the American College of Radiology Thyroid imaging, reporting and Data System (TI-RADS) 2017. INTERPRETING LOCATION: [address] [place] [address], [facility], [address]      IMPRESSION:   1.  Left great toe osteomyelitis   2.  Type 2 diabetes mellitus  3.  Peripheral vascular disease  4.  End-stage renal disease on hemodialysis  5.  Atrial fibrillation      PLAN:   Luis Sepulveda is a 74 y.o. man with a history of type 2 diabetes mellitus complicated by a left great toe diabetic foot ulcer with recent hospitalization in April, peripheral vascular disease status post left lower extremity angioplasty with thrombectomy, end-stage renal disease on hemodialysis, and atrial fibrillation admitted for fevers and worsening left great toe pain, erythema and swelling.  MRI shows osteomyelitis of the distal phalanx of the left great toe.  Patient has been evaluated by the limb preservation service.  Anticipate patient will need amputation for source control.  Blood cultures are negative to date.  Continue vancomycin and ceftriaxone for now.  Duration of antibiotics will depend on extent of surgery and operative findings.  Further recommendations per hospital course.      Plan of care discussed with IM [facility] resident Dr. Pineda . Will continue to follow    Lillian Jordan M.D.      Please note that this dictation was created using voice recognition " software. I have worked with technical experts from [facility] to optimize the interface.  I have made every reasonable attempt to correct obvious errors, but there may be errors of grammar and possibly content that I did not discover before finalizing the note.

## 2020-05-16 NOTE — PROGRESS NOTES
Hd from 3174-2741 hrs. Patient tolerated 3.5 hrs and UF 2000 ml. RIJ CDI. Vs stable during tx. He is fully alert. No complaint, no distress noted. See flow sheet for details. Report given to his Primary Rn.

## 2020-05-16 NOTE — PROGRESS NOTES
[facility] Nephrology Daily Progress Note    Date of Service  5/16/2020    Chief Complaint  Follow up ESRD    Interval Problem Update  75 yo M PMH ESRD TTS iHD, HTN, type 2 DM, anemia of CKD, and CKD-MBD, CAD s/p stents, IDDM, A. fib (currently on apixaban), hypothyroidism and GERD who presented to the [facility] ER with 2 episodes of fever (fever of 101.4F yesterday evening, associated with  worsening pain of the left great toe x 2-3 days. Has had chronic small left great toe ischemic wound going on for many weeks now. Has undelrying severe PAD and S/P Left extremity angiography and angioplasty + thrombectomy of the left posterior tibial artery and left proximal anterior tibial artery - April 2020. Was recently admitted to this hospital last month for similar problems. No aggravating or alleviating factors. He is being admitted for IV antibiotics and we have been consulted for management of dialysis while he is in hospital.     DAILY NEPHROLOGY SUMMARY  5/15/20--No events, afebrile overnight but pt reported some chills, BP stable, tolerated HD yest with 1.7L UF, MRI without gadolinium today showed osteomyelitis of left great toe, pt reports that he has been drinking more PO fluid lately  5/16/20--No events, afebrile overnight, BP stable, seen on dialysis this am, feels ok, some pain in his left toe    Review of Systems  Review of Systems   Constitutional: Positive for malaise/fatigue. Negative for chills and fever.   HENT: Negative for congestion, nosebleeds and sore throat.    Eyes: Negative for blurred vision, double vision and photophobia.   Respiratory: Negative for cough, hemoptysis, sputum production and shortness of breath.    Cardiovascular: Negative for chest pain, palpitations and leg swelling.   Gastrointestinal: Negative for abdominal pain, diarrhea, nausea and vomiting.   Genitourinary: Negative for hematuria.   Musculoskeletal: Positive for joint pain (Left toe pain). Negative for myalgias and neck  pain.   Skin: Negative for itching and rash.        +Left toe ulcer   Neurological: Negative for dizziness, focal weakness and headaches.   Endo/Heme/Allergies: Negative for environmental allergies. Does not bruise/bleed easily.   Psychiatric/Behavioral: Negative for depression. The patient is not nervous/anxious.         Physical Exam  Temp:  [36.6 °C (97.8 °F)-37.2 °C (98.9 °F)] 36.6 °C (97.9 °F)  Pulse:  [78-84] 80  Resp:  [16-18] 18  BP: (122-154)/(47-72) 136/56  SpO2:  [98 %-100 %] 99 %    Physical Exam  Vitals signs and nursing note reviewed.   Constitutional:       General: He is not in acute distress.     Appearance: Normal appearance.   HENT:      Head: Normocephalic and atraumatic.      Right Ear: External ear normal.      Left Ear: External ear normal.      Nose: Nose normal. No congestion.      Mouth/Throat:      Mouth: Mucous membranes are moist.      Pharynx: Oropharynx is clear.   Eyes:      General: No scleral icterus.     Extraocular Movements: Extraocular movements intact.      Conjunctiva/sclera: Conjunctivae normal.   Neck:      Musculoskeletal: Normal range of motion. No neck rigidity.   Cardiovascular:      Rate and Rhythm: Normal rate and regular rhythm.      Heart sounds: No murmur.   Pulmonary:      Effort: Pulmonary effort is normal. No respiratory distress.      Breath sounds: Normal breath sounds. No wheezing.      Comments: +R Chest PC  Abdominal:      General: Bowel sounds are normal. There is no distension.      Palpations: Abdomen is soft.      Tenderness: There is no abdominal tenderness.   Musculoskeletal: Normal range of motion.         General: No swelling or deformity.      Comments: +L arm AVF with thrill/bruit   Skin:     General: Skin is warm and dry.      Findings: Lesion (Left 1st toe ulcer with black eschar) present.   Neurological:      General: No focal deficit present.      Mental Status: He is alert and oriented to person, place, and time. Mental status is at baseline.    Psychiatric:         Mood and Affect: Mood normal.         Behavior: Behavior normal.         Fluids    Intake/Output Summary (Last 24 hours) at 5/16/2020 1157  Last data filed at 5/16/2020 0917  Gross per 24 hour   Intake 477 ml   Output --   Net 477 ml       Laboratory  Recent Labs     05/14/20  0852 05/15/20  0406 05/16/20  0245   WBC 9.7 5.8 5.2   RBC 3.65* 3.23* 3.09*   HEMOGLOBIN 12.0* 10.5* 9.8*   HEMATOCRIT 34.1* 30.4* 29.5*   MCV 93.4 94.1 95.5   MCH 32.9 32.5 31.7   MCHC 35.2 34.5 33.2*   RDW 47.7 49.5 50.0   PLATELETCT 165 155* 150*   MPV 8.8* 9.0 9.1     Recent Labs     05/14/20  0852 05/15/20  0406 05/16/20  0245   SODIUM 130* 127* 135   POTASSIUM 4.7 3.8 4.3   CHLORIDE 91* 92* 96   CO2 22 23 23   GLUCOSE 194* 142* 141*   BUN 44* 26* 44*   CREATININE 6.05* 3.99* 5.75*   CALCIUM 9.5 9.3 9.3         No results for input(s): NTPROBNP in the last 72 hours.        **I have reviewed all labs and imaging reports    IMPRESSION:  - ESRD    * Etiology likely 2/2 HTN/DM    * TTS iHD @ [place] [facility]  - Left foot ischemic ulcer / Severe PAD     * Likely diabetic related    * S/P Left extremity angiography and angioplasty + thrombectomy of the left posterior tibial artery and left proximal anterior tibial artery - April 2020     *XR foot (5/14/2020): No fracture or dislocation.  No cortical destruction of the great toe    *MRI 5/15 without IV rafael shows osteomyelitis of left great toe    * ID Consulted  - HTN    * Goal BP < 140/90    * Controlled  - Anemia of CKD    * Goal Hgb 10-11    * Stable  - CKD-MBD    * Managed at HD unit    * Phos stable  - HLD    * On statin  - CAD    * On statin  - Hyponatremia--will treat with HD    * Pt also admits to drinking a lot of PO fluid lately  - Fever--resolved    * Blood cultures x2 5/14/20 negative     PLAN:  - HD today (SAT) and continue qTTS dialysis schedule  - UF as tolerated  - Recommend 1.5L oral fluid restriction  - ID Consulted  - Abx for osteomyelitis per ID  - Dose  adjust abx and all meds for ESRD requiring HD  - Continue home PO4 binders  - No dietary protein restrictions  - No need for ARMANDO at this time  - Follow up blood culture results  - Avoid PICC lines and Midlines in this ESRD patient to preserve vascular access  - If pt will require access for long-term IV abx recommend a tunneled central line (cannot use dialysis catheter for abx administration)

## 2020-05-16 NOTE — PROGRESS NOTES
LIMB PRESERVATION SERVICE  PROGRESS NOTE    X-ray and admission did not show signs of osteomyelitis.  MRI was completed to assess for osteomyelitis given the increase in size of the patient's toe wound, fever, swelling, and redness to his left great toe.  Wound was positive for osteomyelitis.    Results were discussed with Dr. Pineda.  He agreed to consult infectious disease while I consult with foot and ankle orthopedist, specifically, to address not only possibility of amputation but also to assess patient's hammertoes/claw deformities.   Patient was updated on results and plan for consultation with infectious disease and orthopedic specialist.  He is aware of the possibility of surgery and the need to be n.p.o. at midnight prior to surgery if surgery is determined.    Discussed with: pt, RN, Dr. Pineda, and Dr. Coronel.     Please note that this dictation was created using voice recognition software. I have  worked with technical experts from [facility], [facility] to optimize the interface.  I have made every reasonable attempt to correct obvious errors, but there may be errors of grammar and possibly content that I did not discover before finalizing the note.    Jessica Blackmon, A.P.R.N.    If any questions or concerns, please call [phone]

## 2020-05-16 NOTE — PROGRESS NOTES
Daily Progress Note:     Date of Service: 5/16/2020  Primary Team: [facility] Yellow Team   Attending: Dr. Watts.   Senior Resident: Dr. Mendez  Intern: Dr. Sanchez  Contact:  [phone]    Chief Complaint:   Cellulitis of the left foot with ulcer of the great toe,     Subjective  No events overnight, patient feeling well, MRI was positive osteomyelitis of distal phalanx, ID consulted appreciate recommendations, discussed with the LPS, will consult orthopedics.    Consultants/Specialty:  none  Review of Systems:    Review of Systems   Constitutional: Negative for chills, diaphoresis, fever, malaise/fatigue and weight loss.   HENT: Negative for congestion, sinus pain and sore throat.    Eyes: Negative for blurred vision and double vision.   Respiratory: Negative for cough, hemoptysis, sputum production and shortness of breath.    Cardiovascular: Positive for leg swelling (left leg). Negative for chest pain, palpitations and claudication.   Gastrointestinal: Positive for nausea (chronic). Negative for abdominal pain, blood in stool, constipation, diarrhea and vomiting.   Genitourinary: Negative for dysuria, flank pain and hematuria.   Musculoskeletal: Negative for falls and myalgias.   Neurological: Positive for sensory change (Loss of sensation Bilateral feet (chronic)). Negative for dizziness, tingling, speech change, focal weakness and headaches.   Endo/Heme/Allergies: Does not bruise/bleed easily.   Psychiatric/Behavioral: Negative for depression and substance abuse. The patient is not nervous/anxious.        Objective Data:   Physical Exam:   Vitals:   Temp:  [36.6 °C (97.8 °F)-37.2 °C (98.9 °F)] 36.6 °C (97.9 °F)  Pulse:  [78-84] 80  Resp:  [16-18] 18  BP: (122-154)/(47-72) 136/56  SpO2:  [98 %-100 %] 99 %     Physical Exam  Vitals signs and nursing note reviewed.   Constitutional:       General: He is not in acute distress.     Appearance: He is not diaphoretic.   HENT:      Head: Normocephalic and atraumatic.       Nose: No congestion.      Mouth/Throat:      Mouth: Mucous membranes are moist.      Pharynx: Oropharynx is clear. No oropharyngeal exudate.   Eyes:      Extraocular Movements: Extraocular movements intact.      Conjunctiva/sclera: Conjunctivae normal.   Neck:      Musculoskeletal: Normal range of motion and neck supple. No neck rigidity.   Cardiovascular:      Rate and Rhythm: Normal rate. Rhythm irregular.      Pulses: Normal pulses.      Heart sounds: No murmur.   Pulmonary:      Effort: Pulmonary effort is normal. No respiratory distress.      Breath sounds: Normal breath sounds. No wheezing.   Abdominal:      General: Abdomen is flat. Bowel sounds are normal.      Palpations: Abdomen is soft.      Tenderness: There is no abdominal tenderness.   Musculoskeletal:      Right lower leg: No edema.      Comments: Left great toe with roughly 2 cm ulcer.   Skin:     General: Skin is warm and dry.   Neurological:      General: No focal deficit present.      Mental Status: He is oriented to person, place, and time.      Cranial Nerves: No cranial nerve deficit.      Sensory: Sensory deficit (Bilateral loss on bottom of feet) present.      Motor: No weakness.   Psychiatric:         Mood and Affect: Mood normal.         Behavior: Behavior normal.           Labs:   Recent Labs     05/14/20  0852 05/15/20  0406 05/16/20  0245   WBC 9.7 5.8 5.2   RBC 3.65* 3.23* 3.09*   HEMOGLOBIN 12.0* 10.5* 9.8*   HEMATOCRIT 34.1* 30.4* 29.5*   MCV 93.4 94.1 95.5   MCH 32.9 32.5 31.7   RDW 47.7 49.5 50.0   PLATELETCT 165 155* 150*   MPV 8.8* 9.0 9.1   NEUTSPOLYS 86.30* 74.50* 60.70   LYMPHOCYTES 7.30* 12.30* 22.80   MONOCYTES 5.50 12.20 15.70*   EOSINOPHILS 0.20 0.20 0.20   BASOPHILS 0.30 0.30 0.20     Recent Labs     05/14/20  0852 05/15/20  0406 05/16/20  0245   SODIUM 130* 127* 135   POTASSIUM 4.7 3.8 4.3   CHLORIDE 91* 92* 96   CO2 22 23 23   GLUCOSE 194* 142* 141*   BUN 44* 26* 44*     Recent Labs     05/14/20  0852 05/15/20  0406  05/16/20  0245   ALBUMIN 4.0 3.5 3.3   TBILIRUBIN 0.8 0.5  --    ALKPHOSPHAT 147* 118*  --    TOTPROTEIN 7.3 6.4  --    ALTSGPT 13 12  --    ASTSGOT 13 17  --    CREATININE 6.05* 3.99* 5.75*         Imaging:   MR-FOOT-W/O LEFT   Final Result      Abnormal signal in the distal aspect of the first distal phalanx is consistent with osteomyelitis. Overlying soft tissue wound.      DX-FOOT-COMPLETE 3+ LEFT   Final Result         1. No fracture or dislocation. No cortical destruction of the great toe to suggest osteomyelitis.   2. Mild to moderate multifocal osteoarthrosis.      DX-CHEST-PORTABLE (1 VIEW)   Final Result      Chronic scarring in the left lung base. No new consolidation of pleural effusions.          * Ulcer of left great toe with superimposed cellulitis- (present on admission)  Assessment & Plan  -2 episodes of fevers and worsening pain of the left foot most significant at the left great toe.  - WBC 9.7, CRP 2.91, ESR 58  - XR foot (5/14/2020): No fracture or dislocation.  No cortical destruction of the great toe.  - BC preliminarily negative  Plan:  - Continue vancomycin and ceftriaxone for gram negative coverage. No previous cultures with pseudomonas.  - MRI of the left foot to evaluate for osteomyelitis this patient has had repeated infections involving the left ulcer of the great toe  - LPS to evaluate patient      Osteomyelitis (HCC)- (present on admission)  Assessment & Plan  -Admitted for fever of unknown origin.  -Rate and CRP were slightly elevated on admission.  -Started vancomycin and ceftriaxone on admission.  -MRI showed osteomyelitis of the distal phalanx of the left big toe.  -ID on board appreciate recommendations.  -LPS team on board, orthopedics involved.    Peripheral vascular disease (HCC)- (present on admission)  Assessment & Plan  - On admission in April 2020 patient had vascular intervention with a left extremity angiography and angioplasty and thrombectomy of the left posterior  tibial artery and left proximal anterior tibial artery  -Evaluated by vascular on 5/8/2020 no additional recommendations at that time healing well  Plan:  - Continue home apixaban and clopidogrel    Paroxysmal A-fib ([facility])- (present on admission)  Assessment & Plan  - Continue home axipaban    End stage renal disease on dialysis ([facility])- (present on admission)  Assessment & Plan  -Patient on HD Tuesday, Thursday, Saturday  Plan:  - nephrology on board.      Diabetes mellitus type 2 in nonobese ([facility])- (present on admission)  Assessment & Plan  -Continue home regiment of glargine 5 units q. Evening  -Humalog correctional scale  -Hypoglycemia protocol    Anemia of chronic disease- (present on admission)  Assessment & Plan  -Continue to monitor.  -2/2 to CKD.    Essential hypertension- (present on admission)  Assessment & Plan  Continue home medication    Chronic anticoagulation- (present on admission)  Assessment & Plan  -For paroxysmal atrial fibrillation.  -On Eliquis 2.5 mg twice daily    Secondary hyperparathyroidism of renal origin ([facility])- (present on admission)  Assessment & Plan  - Continue home medications of calcitriol & calcium acetate    Hyperthyroidism- (present on admission)  Assessment & Plan  -Continue home methimazole dosing

## 2020-05-16 NOTE — PROGRESS NOTES
Paged  [facility]-Yellow resident on-call regarding critical lab result (creatinine 5.75) at 0426. Pt receives hemodialysis Tues, Thurs, Sat. No additional orders received.

## 2020-05-16 NOTE — PROGRESS NOTES
Pharmacy Kinetics 74 y.o. male on vancomycin day # 3    2020    Currently on Vancomycin Pulse Dosing d/t ESRD on HD  Dosing history:   : Vanco load 1900 mg IV at 0930  Provider specified end date: TBD    Indication for Treatment: Empiric - L foot infection/osteomyelitis    Pertinent history per medical record: Admitted on 2020 for fevers suspected d/t L foot infection. Patient with history of PVD and recent admission in April for L great toe ulcer. Vascular was consulted and patient ultimately underwent LLE angioplasty and thrombectomy. Imaging during that admission negative for osteomyelitis. Patient reports the wound has grown in size since discharge. He was referred to the ED by his podiatrist as he had also developed fevers PTA. Empiric antibiotics initiated for treatment of his L foot wound, LPS and ID consulting as MRI now suggestive of osteomyelitis.     Other antibiotics: ceftriaxone 2 g IV q24hrs    Allergies: Mircera [methoxy polyethylene glycol-epoetin beta]     Concern for renal function includes ESRD on HD (Tue//Sat schedule).    Pertinent cultures to date:   : peripheral blood cx X2 - NGTD    MRSA nares swab if pneumonia is a concern (ordered/positive/negative/n-a): N/A    Pertinent imagin/15: MRI L foot - Abnormal signal in the distal aspect of the first distal phalanx is consistent with osteomyelitis. Overlying soft tissue wound.  : L L foot xray - No fracture or dislocation. No cortical destruction of the great toe to suggest osteomyelitis.    Recent Labs     20  0852 05/15/20  0406 20  0245   WBC 9.7 5.8 5.2   NEUTSPOLYS 86.30* 74.50* 60.70     Recent Labs     20  0852 05/15/20  0406 20  0245   BUN 44* 26* 44*   CREATININE 6.05* 3.99* 5.75*   ALBUMIN 4.0 3.5 3.3     No results for input(s): VANCOTROUGH, VANCOPEAK, VANCORANDOM in the last 72 hours.    Intake/Output Summary (Last 24 hours) at 2020 1303  Last data filed at 2020 0917  Gross  "per 24 hour   Intake 477 ml   Output --   Net 477 ml      /56   Pulse 80   Temp 36.6 °C (97.9 °F) (Temporal)   Resp 18   Ht 1.676 m (5' 6\")   Wt 77.3 kg (170 lb 6.7 oz)   SpO2 99%  Temp (24hrs), Av.8 °C (98.3 °F), Min:36.6 °C (97.8 °F), Max:37.2 °C (98.9 °F)      A/P   1. Vancomycin dose change: No dose indicated  2. Next vancomycin level: Random level  AM  3. Goal trough: 12-16 mcg/mL  4. Comments: Patient remains on empiric antibiotics for L great toe osteomyelitis. No new culture data available for review, afebrile following admission. Vanco loading dose administered , remains on Tue/Thu/Sat dialysis schedule and last dialyzed today. No further vanco indicated at this time, will plan random vanco level in AM to guide further dosing if level <16 mcg/mL. Pending ortho consult for possible surgical intervention.    Pharmacy will continue to follow.     Fariha Lazo, PharmD        "

## 2020-05-16 NOTE — ASSESSMENT & PLAN NOTE
-Admitted for fever of unknown origin.  -Rate and CRP were slightly elevated on admission.  -Started vancomycin and ceftriaxone on admission.  - BC NTD.   -MRI showed osteomyelitis of the distal phalanx of the left big toe.  -ID on board appreciate recommendations.  -LPS team on board, orthopedics involved.

## 2020-05-16 NOTE — CARE PLAN
Problem: Safety  Goal: Will remain free from falls  Outcome: PROGRESSING AS EXPECTED  Note: Pt is abiding by safety precautions and calls appropriately for assistance.     Problem: Pain Management  Goal: Pain level will decrease to patient's comfort goal  Outcome: PROGRESSING AS EXPECTED  Note: Pt reports that pain is well controlled with acetaminophen.

## 2020-05-17 ENCOUNTER — ANESTHESIA (OUTPATIENT)
Dept: SURGERY | Facility: MEDICAL CENTER | Age: 75
DRG: 617 | End: 2020-05-17
Payer: MEDICARE

## 2020-05-17 ENCOUNTER — ANESTHESIA EVENT (OUTPATIENT)
Dept: SURGERY | Facility: MEDICAL CENTER | Age: 75
DRG: 617 | End: 2020-05-17
Payer: MEDICARE

## 2020-05-17 PROBLEM — M86.172 OSTEOMYELITIS OF FOOT, LEFT, ACUTE (HCC): Status: ACTIVE | Noted: 2020-04-21

## 2020-05-17 PROBLEM — Z79.01 CHRONIC ANTICOAGULATION: Status: RESOLVED | Noted: 2020-03-26 | Resolved: 2020-05-17

## 2020-05-17 LAB
ALBUMIN SERPL BCP-MCNC: 3.5 G/DL (ref 3.2–4.9)
ALBUMIN/GLOB SERPL: 1.2 G/DL
ALP SERPL-CCNC: 108 U/L (ref 30–99)
ALT SERPL-CCNC: 10 U/L (ref 2–50)
ANION GAP SERPL CALC-SCNC: 12 MMOL/L (ref 7–16)
AST SERPL-CCNC: 14 U/L (ref 12–45)
BACTERIA UR CULT: NORMAL
BILIRUB SERPL-MCNC: 0.4 MG/DL (ref 0.1–1.5)
BUN SERPL-MCNC: 25 MG/DL (ref 8–22)
CALCIUM SERPL-MCNC: 9.4 MG/DL (ref 8.5–10.5)
CHLORIDE SERPL-SCNC: 94 MMOL/L (ref 96–112)
CO2 SERPL-SCNC: 26 MMOL/L (ref 20–33)
CREAT SERPL-MCNC: 3.66 MG/DL (ref 0.5–1.4)
ERYTHROCYTE [DISTWIDTH] IN BLOOD BY AUTOMATED COUNT: 49.1 FL (ref 35.9–50)
GLOBULIN SER CALC-MCNC: 3 G/DL (ref 1.9–3.5)
GLUCOSE BLD-MCNC: 101 MG/DL (ref 65–99)
GLUCOSE BLD-MCNC: 109 MG/DL (ref 65–99)
GLUCOSE BLD-MCNC: 140 MG/DL (ref 65–99)
GLUCOSE BLD-MCNC: 91 MG/DL (ref 65–99)
GLUCOSE SERPL-MCNC: 110 MG/DL (ref 65–99)
HCT VFR BLD AUTO: 30.6 % (ref 42–52)
HGB BLD-MCNC: 10.4 G/DL (ref 14–18)
MCH RBC QN AUTO: 32 PG (ref 27–33)
MCHC RBC AUTO-ENTMCNC: 34 G/DL (ref 33.7–35.3)
MCV RBC AUTO: 94.2 FL (ref 81.4–97.8)
PLATELET # BLD AUTO: 163 K/UL (ref 164–446)
PMV BLD AUTO: 9.3 FL (ref 9–12.9)
POTASSIUM SERPL-SCNC: 3.9 MMOL/L (ref 3.6–5.5)
PROT SERPL-MCNC: 6.5 G/DL (ref 6–8.2)
RBC # BLD AUTO: 3.25 M/UL (ref 4.7–6.1)
SIGNIFICANT IND 70042: NORMAL
SITE SITE: NORMAL
SODIUM SERPL-SCNC: 132 MMOL/L (ref 135–145)
SOURCE SOURCE: NORMAL
VANCOMYCIN TROUGH SERPL-MCNC: 9.1 UG/ML (ref 10–20)
WBC # BLD AUTO: 6.4 K/UL (ref 4.8–10.8)

## 2020-05-17 PROCEDURE — 160009 HCHG ANES TIME/MIN: Performed by: ORTHOPAEDIC SURGERY

## 2020-05-17 PROCEDURE — 160028 HCHG SURGERY MINUTES - 1ST 30 MINS LEVEL 3: Performed by: ORTHOPAEDIC SURGERY

## 2020-05-17 PROCEDURE — 770006 HCHG ROOM/CARE - MED/SURG/GYN SEMI*

## 2020-05-17 PROCEDURE — 500423 HCHG DRESSING, ABD COMBINE: Performed by: ORTHOPAEDIC SURGERY

## 2020-05-17 PROCEDURE — 160035 HCHG PACU - 1ST 60 MINS PHASE I: Performed by: ORTHOPAEDIC SURGERY

## 2020-05-17 PROCEDURE — 500881 HCHG PACK, EXTREMITY: Performed by: ORTHOPAEDIC SURGERY

## 2020-05-17 PROCEDURE — 700101 HCHG RX REV CODE 250: Performed by: ANESTHESIOLOGY

## 2020-05-17 PROCEDURE — 99233 SBSQ HOSP IP/OBS HIGH 50: CPT | Mod: GC | Performed by: INTERNAL MEDICINE

## 2020-05-17 PROCEDURE — A6223 GAUZE >16<=48 NO W/SAL W/O B: HCPCS | Performed by: ORTHOPAEDIC SURGERY

## 2020-05-17 PROCEDURE — 700111 HCHG RX REV CODE 636 W/ 250 OVERRIDE (IP): Performed by: STUDENT IN AN ORGANIZED HEALTH CARE EDUCATION/TRAINING PROGRAM

## 2020-05-17 PROCEDURE — 501838 HCHG SUTURE GENERAL: Performed by: ORTHOPAEDIC SURGERY

## 2020-05-17 PROCEDURE — 99233 SBSQ HOSP IP/OBS HIGH 50: CPT | Performed by: INTERNAL MEDICINE

## 2020-05-17 PROCEDURE — 0LNW3ZZ RELEASE LEFT FOOT TENDON, PERCUTANEOUS APPROACH: ICD-10-PCS | Performed by: ORTHOPAEDIC SURGERY

## 2020-05-17 PROCEDURE — 700105 HCHG RX REV CODE 258: Performed by: STUDENT IN AN ORGANIZED HEALTH CARE EDUCATION/TRAINING PROGRAM

## 2020-05-17 PROCEDURE — 700105 HCHG RX REV CODE 258: Performed by: INTERNAL MEDICINE

## 2020-05-17 PROCEDURE — 36415 COLL VENOUS BLD VENIPUNCTURE: CPT

## 2020-05-17 PROCEDURE — 160039 HCHG SURGERY MINUTES - EA ADDL 1 MIN LEVEL 3: Performed by: ORTHOPAEDIC SURGERY

## 2020-05-17 PROCEDURE — 85027 COMPLETE CBC AUTOMATED: CPT

## 2020-05-17 PROCEDURE — 80202 ASSAY OF VANCOMYCIN: CPT

## 2020-05-17 PROCEDURE — 0Y6Q0Z1 DETACHMENT AT LEFT 1ST TOE, HIGH, OPEN APPROACH: ICD-10-PCS | Performed by: ORTHOPAEDIC SURGERY

## 2020-05-17 PROCEDURE — 80053 COMPREHEN METABOLIC PANEL: CPT

## 2020-05-17 PROCEDURE — 160002 HCHG RECOVERY MINUTES (STAT): Performed by: ORTHOPAEDIC SURGERY

## 2020-05-17 PROCEDURE — 700111 HCHG RX REV CODE 636 W/ 250 OVERRIDE (IP): Performed by: INTERNAL MEDICINE

## 2020-05-17 PROCEDURE — 700111 HCHG RX REV CODE 636 W/ 250 OVERRIDE (IP): Performed by: ORTHOPAEDIC SURGERY

## 2020-05-17 PROCEDURE — 160048 HCHG OR STATISTICAL LEVEL 1-5: Performed by: ORTHOPAEDIC SURGERY

## 2020-05-17 PROCEDURE — 700102 HCHG RX REV CODE 250 W/ 637 OVERRIDE(OP): Performed by: HOSPITALIST

## 2020-05-17 PROCEDURE — A9270 NON-COVERED ITEM OR SERVICE: HCPCS | Performed by: STUDENT IN AN ORGANIZED HEALTH CARE EDUCATION/TRAINING PROGRAM

## 2020-05-17 PROCEDURE — 700105 HCHG RX REV CODE 258: Performed by: ANESTHESIOLOGY

## 2020-05-17 PROCEDURE — 700111 HCHG RX REV CODE 636 W/ 250 OVERRIDE (IP): Performed by: ANESTHESIOLOGY

## 2020-05-17 PROCEDURE — 700102 HCHG RX REV CODE 250 W/ 637 OVERRIDE(OP): Performed by: STUDENT IN AN ORGANIZED HEALTH CARE EDUCATION/TRAINING PROGRAM

## 2020-05-17 PROCEDURE — 82962 GLUCOSE BLOOD TEST: CPT | Mod: 91

## 2020-05-17 PROCEDURE — 700101 HCHG RX REV CODE 250: Performed by: ORTHOPAEDIC SURGERY

## 2020-05-17 RX ORDER — HALOPERIDOL 5 MG/ML
1 INJECTION INTRAMUSCULAR
Status: DISCONTINUED | OUTPATIENT
Start: 2020-05-17 | End: 2020-05-17 | Stop reason: HOSPADM

## 2020-05-17 RX ORDER — MAGNESIUM HYDROXIDE 1200 MG/15ML
LIQUID ORAL
Status: COMPLETED | OUTPATIENT
Start: 2020-05-17 | End: 2020-05-17

## 2020-05-17 RX ORDER — LIDOCAINE HYDROCHLORIDE 20 MG/ML
INJECTION, SOLUTION EPIDURAL; INFILTRATION; INTRACAUDAL; PERINEURAL PRN
Status: DISCONTINUED | OUTPATIENT
Start: 2020-05-17 | End: 2020-05-17 | Stop reason: SURG

## 2020-05-17 RX ORDER — OXYCODONE HCL 5 MG/5 ML
10 SOLUTION, ORAL ORAL
Status: DISCONTINUED | OUTPATIENT
Start: 2020-05-17 | End: 2020-05-17 | Stop reason: HOSPADM

## 2020-05-17 RX ORDER — HYDRALAZINE HYDROCHLORIDE 20 MG/ML
5 INJECTION INTRAMUSCULAR; INTRAVENOUS
Status: DISCONTINUED | OUTPATIENT
Start: 2020-05-17 | End: 2020-05-17 | Stop reason: HOSPADM

## 2020-05-17 RX ORDER — LABETALOL HYDROCHLORIDE 5 MG/ML
5 INJECTION, SOLUTION INTRAVENOUS
Status: DISCONTINUED | OUTPATIENT
Start: 2020-05-17 | End: 2020-05-17 | Stop reason: HOSPADM

## 2020-05-17 RX ORDER — ONDANSETRON 2 MG/ML
INJECTION INTRAMUSCULAR; INTRAVENOUS PRN
Status: DISCONTINUED | OUTPATIENT
Start: 2020-05-17 | End: 2020-05-17 | Stop reason: SURG

## 2020-05-17 RX ORDER — HYDROMORPHONE HYDROCHLORIDE 1 MG/ML
0.4 INJECTION, SOLUTION INTRAMUSCULAR; INTRAVENOUS; SUBCUTANEOUS
Status: DISCONTINUED | OUTPATIENT
Start: 2020-05-17 | End: 2020-05-17 | Stop reason: HOSPADM

## 2020-05-17 RX ORDER — IPRATROPIUM BROMIDE AND ALBUTEROL SULFATE 2.5; .5 MG/3ML; MG/3ML
3 SOLUTION RESPIRATORY (INHALATION)
Status: DISCONTINUED | OUTPATIENT
Start: 2020-05-17 | End: 2020-05-17 | Stop reason: HOSPADM

## 2020-05-17 RX ORDER — SODIUM CHLORIDE 9 MG/ML
INJECTION, SOLUTION INTRAVENOUS CONTINUOUS
Status: DISCONTINUED | OUTPATIENT
Start: 2020-05-17 | End: 2020-05-17 | Stop reason: HOSPADM

## 2020-05-17 RX ORDER — CEFAZOLIN SODIUM 1 G/3ML
INJECTION, POWDER, FOR SOLUTION INTRAMUSCULAR; INTRAVENOUS PRN
Status: DISCONTINUED | OUTPATIENT
Start: 2020-05-17 | End: 2020-05-17 | Stop reason: SURG

## 2020-05-17 RX ORDER — ONDANSETRON 2 MG/ML
4 INJECTION INTRAMUSCULAR; INTRAVENOUS
Status: DISCONTINUED | OUTPATIENT
Start: 2020-05-17 | End: 2020-05-17 | Stop reason: HOSPADM

## 2020-05-17 RX ORDER — SODIUM CHLORIDE 9 MG/ML
INJECTION, SOLUTION INTRAVENOUS
Status: DISCONTINUED | OUTPATIENT
Start: 2020-05-17 | End: 2020-05-17 | Stop reason: SURG

## 2020-05-17 RX ORDER — VANCOMYCIN HYDROCHLORIDE 1 G/20ML
INJECTION, POWDER, LYOPHILIZED, FOR SOLUTION INTRAVENOUS
Status: COMPLETED | OUTPATIENT
Start: 2020-05-17 | End: 2020-05-17

## 2020-05-17 RX ORDER — BUPIVACAINE HYDROCHLORIDE 5 MG/ML
INJECTION, SOLUTION EPIDURAL; INTRACAUDAL
Status: DISCONTINUED | OUTPATIENT
Start: 2020-05-17 | End: 2020-05-17 | Stop reason: HOSPADM

## 2020-05-17 RX ORDER — OXYCODONE HCL 5 MG/5 ML
5 SOLUTION, ORAL ORAL
Status: DISCONTINUED | OUTPATIENT
Start: 2020-05-17 | End: 2020-05-17 | Stop reason: HOSPADM

## 2020-05-17 RX ORDER — HYDROMORPHONE HYDROCHLORIDE 1 MG/ML
0.2 INJECTION, SOLUTION INTRAMUSCULAR; INTRAVENOUS; SUBCUTANEOUS
Status: DISCONTINUED | OUTPATIENT
Start: 2020-05-17 | End: 2020-05-17 | Stop reason: HOSPADM

## 2020-05-17 RX ORDER — LIDOCAINE HYDROCHLORIDE 10 MG/ML
INJECTION, SOLUTION INFILTRATION; PERINEURAL
Status: DISCONTINUED | OUTPATIENT
Start: 2020-05-17 | End: 2020-05-17 | Stop reason: HOSPADM

## 2020-05-17 RX ORDER — HYDROMORPHONE HYDROCHLORIDE 1 MG/ML
0.1 INJECTION, SOLUTION INTRAMUSCULAR; INTRAVENOUS; SUBCUTANEOUS
Status: DISCONTINUED | OUTPATIENT
Start: 2020-05-17 | End: 2020-05-17 | Stop reason: HOSPADM

## 2020-05-17 RX ADMIN — VANCOMYCIN HYDROCHLORIDE 1200 MG: 500 INJECTION, POWDER, LYOPHILIZED, FOR SOLUTION INTRAVENOUS at 08:42

## 2020-05-17 RX ADMIN — ONDANSETRON 4 MG: 2 INJECTION INTRAMUSCULAR; INTRAVENOUS at 14:02

## 2020-05-17 RX ADMIN — METHIMAZOLE 5 MG: 5 TABLET ORAL at 17:53

## 2020-05-17 RX ADMIN — Medication 1334 MG: at 07:43

## 2020-05-17 RX ADMIN — INSULIN GLARGINE 5 UNITS: 100 INJECTION, SOLUTION SUBCUTANEOUS at 22:23

## 2020-05-17 RX ADMIN — CEFTRIAXONE SODIUM 2 G: 2 INJECTION, POWDER, FOR SOLUTION INTRAMUSCULAR; INTRAVENOUS at 22:42

## 2020-05-17 RX ADMIN — Medication 1334 MG: at 17:52

## 2020-05-17 RX ADMIN — CLOPIDOGREL BISULFATE 75 MG: 75 TABLET ORAL at 05:46

## 2020-05-17 RX ADMIN — ATORVASTATIN CALCIUM 40 MG: 40 TABLET, FILM COATED ORAL at 22:24

## 2020-05-17 RX ADMIN — ACETAMINOPHEN 650 MG: 325 TABLET, FILM COATED ORAL at 17:57

## 2020-05-17 RX ADMIN — SODIUM CHLORIDE: 9 INJECTION, SOLUTION INTRAVENOUS at 13:30

## 2020-05-17 RX ADMIN — CEFAZOLIN 2 G: 330 INJECTION, POWDER, FOR SOLUTION INTRAMUSCULAR; INTRAVENOUS at 13:41

## 2020-05-17 RX ADMIN — PROPOFOL 150 MG: 10 INJECTION, EMULSION INTRAVENOUS at 13:34

## 2020-05-17 RX ADMIN — LIDOCAINE HYDROCHLORIDE 80 MG: 20 INJECTION, SOLUTION EPIDURAL; INFILTRATION; INTRACAUDAL at 13:34

## 2020-05-17 RX ADMIN — CARVEDILOL 3.12 MG: 3.12 TABLET, FILM COATED ORAL at 07:44

## 2020-05-17 RX ADMIN — CALCITRIOL CAPSULES 0.25 MCG 0.25 MCG: 0.25 CAPSULE ORAL at 05:46

## 2020-05-17 RX ADMIN — OMEPRAZOLE 20 MG: 20 CAPSULE, DELAYED RELEASE ORAL at 22:24

## 2020-05-17 RX ADMIN — METHIMAZOLE 7.5 MG: 5 TABLET ORAL at 05:46

## 2020-05-17 RX ADMIN — FENTANYL CITRATE 50 MCG: 50 INJECTION INTRAMUSCULAR; INTRAVENOUS at 13:31

## 2020-05-17 RX ADMIN — CEFTRIAXONE SODIUM 2 G: 2 INJECTION, POWDER, FOR SOLUTION INTRAMUSCULAR; INTRAVENOUS at 17:53

## 2020-05-17 ASSESSMENT — ENCOUNTER SYMPTOMS
FALLS: 0
NERVOUS/ANXIOUS: 0
MYALGIAS: 1
BRUISES/BLEEDS EASILY: 0
CLAUDICATION: 0
DOUBLE VISION: 0
SHORTNESS OF BREATH: 0
HEMOPTYSIS: 0
SINUS PAIN: 0
NAUSEA: 0
VOMITING: 0
SPEECH CHANGE: 0
PALPITATIONS: 0
DIZZINESS: 0
FOCAL WEAKNESS: 0
NECK PAIN: 0
SORE THROAT: 0
NAUSEA: 1
TINGLING: 0
SENSORY CHANGE: 1
DIARRHEA: 0
PHOTOPHOBIA: 0
MYALGIAS: 0
CONSTIPATION: 0
DEPRESSION: 0
DIAPHORESIS: 0
FEVER: 0
HEADACHES: 0
BLURRED VISION: 0
FLANK PAIN: 0
ABDOMINAL PAIN: 0
COUGH: 0
SPUTUM PRODUCTION: 0
CHILLS: 0
BLOOD IN STOOL: 0
WEIGHT LOSS: 0

## 2020-05-17 ASSESSMENT — LIFESTYLE VARIABLES: SUBSTANCE_ABUSE: 0

## 2020-05-17 ASSESSMENT — PAIN SCALES - GENERAL: PAIN_LEVEL: 0

## 2020-05-17 NOTE — ANESTHESIA PROCEDURE NOTES
Airway    Date/Time: 5/17/2020 1:35 PM  Performed by: Jose Luis Denis M.D.  Authorized by: Jose Luis Denis M.D.     Location:  OR  Urgency:  Elective  Indications for Airway Management:  Anesthesia      Spontaneous Ventilation: absent    Sedation Level:  Deep  Preoxygenated: Yes    Final Airway Type:  Supraglottic airway  Final Supraglottic Airway:  Standard LMA    SGA Size:  4  Number of Attempts at Approach:  1

## 2020-05-17 NOTE — ANESTHESIA TIME REPORT
Anesthesia Start and Stop Event Times     Date Time Event    5/17/2020 1311 Ready for Procedure     1330 Anesthesia Start     1415 Anesthesia Stop        Responsible Staff  05/17/20    Name Role Begin End    Jose Luis Denis M.D. Anesth 1330 1415        Preop Diagnosis (Free Text):  Pre-op Diagnosis     NECROTIC LEFT GREAT TOE        Preop Diagnosis (Codes):    Post op Diagnosis  Chronic ulcer of great toe of left foot (HCC)      Premium Reason  B. 1st Call    Comments:

## 2020-05-17 NOTE — CONSULTS
5/17/2020    Ortho LPS Consult    Luis Sepulveda is a 74 y.o. male who presents with fever, chills and leukocytosis and left foot erythema and swelling.  He is noted to have a left dry eschar on his left great toe distal phalanx and there is increased uptake on the MRI in this phalanx.  Erythema and symptoms resolved with IV antibiotics.  Patient states wound was noticed incidentally 1 month ago without known trauma.  He is diabetic and is insensate on plantar feet.  He had a left PT and arterial artery angioplasty in April of 2020 and last evaluated by vascular this May.      Past Medical History:   Diagnosis Date   • CAD (coronary artery disease)     stents   • Chronic anticoagulation 3/26/2020   • Chronic kidney disease (CKD), stage V (HCC)    • Diabetes    • Hypertension    • Osteoarthritis    • Peripheral neuropathy        Past Surgical History:   Procedure Laterality Date   • APPENDECTOMY     • OTHER CARDIAC SURGERY      stents x1   • OTHER ORTHOPEDIC SURGERY      knee surgery       Medications  No current facility-administered medications on file prior to encounter.      Current Outpatient Medications on File Prior to Encounter   Medication Sig Dispense Refill   • methimazole (TAPAZOLE) 5 MG Tab Take 1.5 tabs in am, and 1 tab in evening 270 Tab 0   • clopidogrel (PLAVIX) 75 MG Tab Take 1 Tab by mouth every day. 90 Tab 1   • calcitRIOL (ROCALTROL) 0.25 MCG Cap Take 0.25 mcg by mouth every day.     • apixaban (ELIQUIS) 2.5mg Tab Take 2.5 mg by mouth 2 Times a Day.     • carvedilol (COREG) 3.125 MG Tab Take 3.125 mg by mouth 2 times a day, with meals.     • insulin glargine (LANTUS SOLOSTAR) 100 UNIT/ML Solution Pen-injector injection Inject 5 Units as instructed every bedtime.     • pantoprazole (PROTONIX) 40 MG Tablet Delayed Response Take 40 mg by mouth every bedtime.     • calcium acetate (PHOS-LO) 667 MG Cap Take 1,334 mg by mouth 3 times a day, with meals.     • atorvastatin (LIPITOR) 40 MG Tab  Take 1 Tab by mouth every bedtime. 30 Tab 0       Allergies  Mircera [methoxy polyethylene glycol-epoetin beta]    ROS  See hpi. All other systems were reviewed and found to be negative    Family History   Problem Relation Age of Onset   • Heart Disease Mother    • Alcohol/Drug Father    • Thyroid Son    • Diabetes Brother    • Hypertension Neg Hx    • Hyperlipidemia Neg Hx        Social History     Socioeconomic History   • Marital status:      Spouse name: Not on file   • Number of children: Not on file   • Years of education: Not on file   • Highest education level: Not on file   Occupational History   • Not on file   Social Needs   • Financial resource strain: Not hard at all   • Food insecurity     Worry: Never true     Inability: Never true   • Transportation needs     Medical: No     Non-medical: No   Tobacco Use   • Smoking status: Former Smoker     Packs/day: 1.00     Years: 55.00     Pack years: 55.00     Types: Cigarettes     Last attempt to quit: 2014     Years since quittin.3   • Smokeless tobacco: Never Used   Substance and Sexual Activity   • Alcohol use: No   • Drug use: No   • Sexual activity: Not on file   Lifestyle   • Physical activity     Days per week: Not on file     Minutes per session: Not on file   • Stress: Not on file   Relationships   • Social connections     Talks on phone: Not on file     Gets together: Not on file     Attends Mandaeism service: Not on file     Active member of club or organization: Not on file     Attends meetings of clubs or organizations: Not on file     Relationship status: Not on file   • Intimate partner violence     Fear of current or ex partner: Not on file     Emotionally abused: Not on file     Physically abused: Not on file     Forced sexual activity: Not on file   Other Topics Concern   • Not on file   Social History Narrative   • Not on file       Physical Exam  Vitals  /57   Pulse 79   Temp 36.7 °C (98 °F) (Temporal)   Resp 16    "Ht 1.676 m (5' 6\")   Wt 77.3 kg (170 lb 6.7 oz)   SpO2 97%   General: Well Developed, Well Nourished, no acute distress  HEENT: Normocephalic, atraumatic  Eyes: Anicteric, PERRLA, EOMI  Neck: Supple, nontender, no masses  Lungs: CTA, no wheezes or crackles  Heart: RRR, no murmurs, rubs or gallops  Abdomen: Soft, NT, ND  Pelvis: Stable to AP and Lateral Compression  Skin: eschar left great toe  Extremities: left great toe hammering and hammering of 2-5 as well  Neuro: insensate plantarly  Vascular: palpable PT/DP, Capillary refill <2 seconds    Radiographs:  MR-FOOT-W/O LEFT   Final Result      Abnormal signal in the distal aspect of the first distal phalanx is consistent with osteomyelitis. Overlying soft tissue wound.      DX-FOOT-COMPLETE 3+ LEFT   Final Result         1. No fracture or dislocation. No cortical destruction of the great toe to suggest osteomyelitis.   2. Mild to moderate multifocal osteoarthrosis.      DX-CHEST-PORTABLE (1 VIEW)   Final Result      Chronic scarring in the left lung base. No new consolidation of pleural effusions.          Laboratory Values  Recent Labs     05/15/20  0406 05/16/20  0245 05/17/20  0247   WBC 5.8 5.2 6.4   RBC 3.23* 3.09* 3.25*   HEMOGLOBIN 10.5* 9.8* 10.4*   HEMATOCRIT 30.4* 29.5* 30.6*   MCV 94.1 95.5 94.2   MCH 32.5 31.7 32.0   MCHC 34.5 33.2* 34.0   RDW 49.5 50.0 49.1   PLATELETCT 155* 150* 163*   MPV 9.0 9.1 9.3     Recent Labs     05/15/20  0406 05/16/20  0245 05/17/20  0247   SODIUM 127* 135 132*   POTASSIUM 3.8 4.3 3.9   CHLORIDE 92* 96 94*   CO2 23 23 26   GLUCOSE 142* 141* 110*   BUN 26* 44* 25*             Impression: Left great toe distal phalanx with possible osteomyelitis and ulceration, Hammertoe deformities of 1-5 toes    Plan:  Discussed options for surgery, likely plan will include partial great toe amputation and flexor tenotomies of 2-5 toes      Operative intervention. Risks and benefits of surgery were discussed which include but are not " limited to bleeding, infection, neurovascular damage, malunion, nonunion, instability, limb length discrepancy, DVT, PE, MI, Stroke and death. They understand these risks and wish to proceed.

## 2020-05-17 NOTE — PROGRESS NOTES
[facility] Nephrology Daily Progress Note    Date of Service  5/17/2020    Chief Complaint  Follow up ESRD    Interval Problem Update  73 yo M PMH ESRD TTS iHD, HTN, type 2 DM, anemia of CKD, and CKD-MBD, CAD s/p stents, IDDM, A. fib (currently on apixaban), hypothyroidism and GERD who presented to the [facility] ER with 2 episodes of fever (fever of 101.4F yesterday evening, associated with  worsening pain of the left great toe x 2-3 days. Has had chronic small left great toe ischemic wound going on for many weeks now. Has undelrying severe PAD and S/P Left extremity angiography and angioplasty + thrombectomy of the left posterior tibial artery and left proximal anterior tibial artery - April 2020. Was recently admitted to this hospital last month for similar problems. No aggravating or alleviating factors. He is being admitted for IV antibiotics and we have been consulted for management of dialysis while he is in hospital.     DAILY NEPHROLOGY SUMMARY  5/15/20--No events, afebrile overnight but pt reported some chills, BP stable, tolerated HD yest with 1.7L UF, MRI without gadolinium today showed osteomyelitis of left great toe, pt reports that he has been drinking more PO fluid lately  5/16/20--No events, afebrile overnight, BP stable, seen on dialysis this am, feels ok, some pain in his left toe  5/17/20--No events, feels ok, denies any pain, BP stable, to be seen by ortho for possible amputation of left first toe    Review of Systems  Review of Systems   Constitutional: Positive for malaise/fatigue. Negative for chills and fever.   HENT: Negative for congestion, nosebleeds and sore throat.    Eyes: Negative for blurred vision, double vision and photophobia.   Respiratory: Negative for cough, hemoptysis, sputum production and shortness of breath.    Cardiovascular: Negative for chest pain, palpitations and leg swelling.   Gastrointestinal: Negative for abdominal pain, diarrhea, nausea and vomiting.    Genitourinary: Negative for hematuria.   Musculoskeletal: Negative for joint pain, myalgias and neck pain.   Skin: Negative for itching and rash.        +Left toe ulcer   Neurological: Negative for dizziness, focal weakness and headaches.   Endo/Heme/Allergies: Negative for environmental allergies. Does not bruise/bleed easily.   Psychiatric/Behavioral: Negative for depression. The patient is not nervous/anxious.         Physical Exam  Temp:  [36.7 °C (98 °F)-37.5 °C (99.5 °F)] 36.7 °C (98 °F)  Pulse:  [71-79] 79  Resp:  [16-18] 16  BP: (101-125)/(55-76) 120/57  SpO2:  [96 %-99 %] 97 %    Physical Exam  Vitals signs and nursing note reviewed.   Constitutional:       General: He is not in acute distress.     Appearance: Normal appearance.   HENT:      Head: Normocephalic and atraumatic.      Right Ear: External ear normal.      Left Ear: External ear normal.      Nose: Nose normal. No congestion.      Mouth/Throat:      Mouth: Mucous membranes are moist.      Pharynx: Oropharynx is clear.   Eyes:      General: No scleral icterus.     Extraocular Movements: Extraocular movements intact.      Conjunctiva/sclera: Conjunctivae normal.   Neck:      Musculoskeletal: Normal range of motion. No neck rigidity.   Cardiovascular:      Rate and Rhythm: Normal rate and regular rhythm.      Heart sounds: No murmur.   Pulmonary:      Effort: Pulmonary effort is normal. No respiratory distress.      Breath sounds: Normal breath sounds. No wheezing.      Comments: +R Chest PC  Abdominal:      General: Bowel sounds are normal. There is no distension.      Palpations: Abdomen is soft.      Tenderness: There is no abdominal tenderness.   Musculoskeletal: Normal range of motion.         General: No swelling or deformity.      Comments: +L arm AVF with thrill/bruit   Skin:     General: Skin is warm and dry.      Findings: Lesion (Left 1st toe ulcer with black eschar) present.   Neurological:      General: No focal deficit present.       Mental Status: He is alert and oriented to person, place, and time. Mental status is at baseline.   Psychiatric:         Mood and Affect: Mood normal.         Behavior: Behavior normal.         Fluids    Intake/Output Summary (Last 24 hours) at 5/17/2020 0934  Last data filed at 5/17/2020 0900  Gross per 24 hour   Intake 1205 ml   Output 2300 ml   Net -1095 ml       Laboratory  Recent Labs     05/15/20  0406 05/16/20  0245 05/17/20  0247   WBC 5.8 5.2 6.4   RBC 3.23* 3.09* 3.25*   HEMOGLOBIN 10.5* 9.8* 10.4*   HEMATOCRIT 30.4* 29.5* 30.6*   MCV 94.1 95.5 94.2   MCH 32.5 31.7 32.0   MCHC 34.5 33.2* 34.0   RDW 49.5 50.0 49.1   PLATELETCT 155* 150* 163*   MPV 9.0 9.1 9.3     Recent Labs     05/15/20  0406 05/16/20  0245 05/17/20  0247   SODIUM 127* 135 132*   POTASSIUM 3.8 4.3 3.9   CHLORIDE 92* 96 94*   CO2 23 23 26   GLUCOSE 142* 141* 110*   BUN 26* 44* 25*   CREATININE 3.99* 5.75* 3.66*   CALCIUM 9.3 9.3 9.4         No results for input(s): NTPROBNP in the last 72 hours.        **I have reviewed all labs and imaging reports    IMPRESSION:  - ESRD    * Etiology likely 2/2 HTN/DM    * TTS iHD @ [facility]  - Left foot ischemic ulcer / Severe PAD     * Likely diabetic related    * S/P Left extremity angiography and angioplasty + thrombectomy of the left posterior tibial artery and left proximal anterior tibial artery - April 2020     *XR foot (5/14/2020): No fracture or dislocation.  No cortical destruction of the great toe    *MRI 5/15 without IV rafael shows osteomyelitis of left great toe    * ID Consulted  - HTN    * Goal BP < 140/90    * Controlled  - Anemia of CKD    * Goal Hgb 10-11    * Stable  - CKD-MBD    * Managed at HD unit    * Phos stable  - HLD    * On statin  - CAD    * On statin  - Hyponatremia--will treat with HD    * Pt also admits to drinking a lot of PO fluid  - Fever--resolved    * Blood cultures x2 5/14/20 negative     PLAN:  - No HD today (SUN), continue qTTS dialysis schedule  - UF as tolerated  -  Recommend 1.5L oral fluid restriction  - ID and Ortho Consulted  - Abx for osteomyelitis per ID  - Dose adjust abx and all meds for ESRD requiring HD  - Continue home PO4 binders  - No dietary protein restrictions  - No need for ARMANDO at this time  - Follow up blood culture results  - Avoid PICC lines and Midlines in this ESRD patient to preserve vascular access  - If pt will require access for long-term IV abx recommend a tunneled central line (cannot use dialysis catheter for abx administration)

## 2020-05-17 NOTE — PROGRESS NOTES
Daily Progress Note:     Date of Service: 5/17/2020  Primary Team: [employer] [facility] Yellow Team   Attending: Dr. Watts.   Senior Resident: Dr. Mendez  Intern: Dr. Sanchez  Contact:  [phone]    Chief Complaint:   Osteomyelitis of the left great toe distal phalax with diabetic ulcer    Subjective  No events overnight.  Patient states that pain has improved significantly in the left great toe.  He denies noting any drainage from the ulcer site. Partial toe amputation today.  All questions answered.      Consultants/Specialty:  none  Review of Systems:    Review of Systems   Constitutional: Negative for chills, diaphoresis, fever, malaise/fatigue and weight loss.   HENT: Negative for congestion, sinus pain and sore throat.    Eyes: Negative for blurred vision and double vision.   Respiratory: Negative for cough, hemoptysis, sputum production and shortness of breath.    Cardiovascular: Positive for leg swelling (left leg). Negative for chest pain, palpitations and claudication.   Gastrointestinal: Positive for nausea (chronic). Negative for abdominal pain, blood in stool, constipation, diarrhea and vomiting.   Genitourinary: Negative for dysuria, flank pain and hematuria.   Musculoskeletal: Negative for falls and myalgias.   Neurological: Positive for sensory change (Loss of sensation Bilateral feet (chronic)). Negative for dizziness, tingling, speech change, focal weakness and headaches.   Endo/Heme/Allergies: Does not bruise/bleed easily.   Psychiatric/Behavioral: Negative for depression and substance abuse. The patient is not nervous/anxious.        Objective Data:   Physical Exam:   Vitals:   Temp:  [36.6 °C (97.9 °F)-37.5 °C (99.5 °F)] 37.5 °C (99.5 °F)  Pulse:  [71-80] 77  Resp:  [18] 18  BP: (101-136)/(55-76) 125/55  SpO2:  [96 %-99 %] 99 %     Physical Exam  Vitals signs and nursing note reviewed.   Constitutional:       General: He is not in acute distress.     Appearance: He is not diaphoretic.   HENT:      Head:  Normocephalic and atraumatic.      Nose: No congestion.      Mouth/Throat:      Mouth: Mucous membranes are moist.      Pharynx: Oropharynx is clear. No oropharyngeal exudate.   Eyes:      Extraocular Movements: Extraocular movements intact.      Conjunctiva/sclera: Conjunctivae normal.   Neck:      Musculoskeletal: Normal range of motion and neck supple. No neck rigidity.   Cardiovascular:      Rate and Rhythm: Normal rate. Rhythm irregular.      Pulses: Normal pulses.      Heart sounds: No murmur.   Pulmonary:      Effort: Pulmonary effort is normal. No respiratory distress.      Breath sounds: Normal breath sounds. No wheezing.   Abdominal:      General: Abdomen is flat. Bowel sounds are normal.      Palpations: Abdomen is soft.      Tenderness: There is no abdominal tenderness.   Musculoskeletal:      Right lower leg: No edema.      Comments: Left great toe with roughly 2 cm ulcer.   Skin:     General: Skin is warm and dry.   Neurological:      General: No focal deficit present.      Mental Status: He is oriented to person, place, and time.      Cranial Nerves: No cranial nerve deficit.      Sensory: Sensory deficit (Bilateral loss on bottom of feet) present.      Motor: No weakness.   Psychiatric:         Mood and Affect: Mood normal.         Behavior: Behavior normal.           Labs:   Recent Labs     05/14/20  0852 05/15/20  0406 05/16/20 0245 05/17/20 0247   WBC 9.7 5.8 5.2 6.4   RBC 3.65* 3.23* 3.09* 3.25*   HEMOGLOBIN 12.0* 10.5* 9.8* 10.4*   HEMATOCRIT 34.1* 30.4* 29.5* 30.6*   MCV 93.4 94.1 95.5 94.2   MCH 32.9 32.5 31.7 32.0   RDW 47.7 49.5 50.0 49.1   PLATELETCT 165 155* 150* 163*   MPV 8.8* 9.0 9.1 9.3   NEUTSPOLYS 86.30* 74.50* 60.70  --    LYMPHOCYTES 7.30* 12.30* 22.80  --    MONOCYTES 5.50 12.20 15.70*  --    EOSINOPHILS 0.20 0.20 0.20  --    BASOPHILS 0.30 0.30 0.20  --      Recent Labs     05/15/20  0406 05/16/20 0245 05/17/20 0247   SODIUM 127* 135 132*   POTASSIUM 3.8 4.3 3.9   CHLORIDE  92* 96 94*   CO2 23 23 26   GLUCOSE 142* 141* 110*   BUN 26* 44* 25*     Recent Labs     05/14/20  0852 05/15/20  0406 05/16/20  0245 05/17/20  0247   ALBUMIN 4.0 3.5 3.3 3.5   TBILIRUBIN 0.8 0.5  --  0.4   ALKPHOSPHAT 147* 118*  --  108*   TOTPROTEIN 7.3 6.4  --  6.5   ALTSGPT 13 12  --  10   ASTSGOT 13 17  --  14   CREATININE 6.05* 3.99* 5.75* 3.66*         Imaging:   MR-FOOT-W/O LEFT   Final Result      Abnormal signal in the distal aspect of the first distal phalanx is consistent with osteomyelitis. Overlying soft tissue wound.      DX-FOOT-COMPLETE 3+ LEFT   Final Result         1. No fracture or dislocation. No cortical destruction of the great toe to suggest osteomyelitis.   2. Mild to moderate multifocal osteoarthrosis.      DX-CHEST-PORTABLE (1 VIEW)   Final Result      Chronic scarring in the left lung base. No new consolidation of pleural effusions.          * Osteomyelitis of foot, left, acute (HCC)- (present on admission)  Assessment & Plan  - Source if Ulcer of left great toe  - XR foot (5/14/2020): No fracture or dislocation.  No cortical destruction of the great toe.  - MRI (5/15/20): abnormal signal in the distal aspect of the first distal phalanx consistent with osteomyelitis  - BC preliminarily negative  Plan:  - Continue vancomycin and ceftriaxone for gram negative coverage. No previous cultures with pseudomonas. ID on board and agrees.  - LPS on board  -Orthopedic surgery: partial great toe amputation and flexor tenotomies of 2-5 toes.  Patient is n.p.o.  Apixaban has been held.  -resume diabetic diet post operatively    Peripheral vascular disease (HCC)- (present on admission)  Assessment & Plan  - On admission in April 2020 patient had vascular intervention with a left extremity angiography and angioplasty and thrombectomy of the left posterior tibial artery and left proximal anterior tibial artery  -Evaluated by vascular on 5/8/2020 no additional recommendations at that time healing  well  Plan:  - Continue home apixaban and clopidogrel    Paroxysmal A-fib ([facility])- (present on admission)  Assessment & Plan  - Hold apixaban for orthopedic intervention.    End stage renal disease on dialysis ([facility])- (present on admission)  Assessment & Plan  -Patient on HD Tuesday, Thursday, Saturday  Plan:  - nephrology on board.    -1.5L fluid restriction    Diabetes mellitus type 2 in nonobese ([facility])- (present on admission)  Assessment & Plan  -Continue home regiment of glargine 5 units q. Evening  -Humalog correctional scale  -Hypoglycemia protocol    Anemia of chronic disease- (present on admission)  Assessment & Plan  -Continue to monitor.  -2/2 to CKD.    Essential hypertension- (present on admission)  Assessment & Plan  Continue home medication    Secondary hyperparathyroidism of renal origin ([facility])- (present on admission)  Assessment & Plan  - Continue home medications of calcitriol & calcium acetate    Hyperthyroidism- (present on admission)  Assessment & Plan  -Continue home methimazole dosing

## 2020-05-17 NOTE — PROGRESS NOTES
Infectious Disease Progress Note    Author: Lillian Jordan M.D. Date & Time of service: 2020  8:20 AM    Chief Complaint:  Follow-up for left great toe osteomyelitis    Interval History:   afebrile WBC 6.4 plan for left great toe amputation today, left toe pain decreased. Tolerating IV abx without any issues      Labs Reviewed, Medications Reviewed and Wound Reviewed.    Review of Systems:  Review of Systems   Constitutional: Negative for chills and fever.   Respiratory: Negative for cough and shortness of breath.    Cardiovascular: Negative for chest pain.   Gastrointestinal: Negative for abdominal pain, diarrhea, nausea and vomiting.   Genitourinary: Negative for dysuria.   Musculoskeletal: Positive for myalgias.   Neurological: Negative for dizziness and headaches.   All other systems reviewed and are negative.      Hemodynamics:  Temp (24hrs), Av.3 °C (99.2 °F), Min:37.1 °C (98.8 °F), Max:37.5 °C (99.5 °F)  Temperature: 37.5 °C (99.5 °F)  Pulse  Av.1  Min: 70  Max: 133   Blood Pressure : 120/57       Physical Exam:  Physical Exam  Vitals signs and nursing note reviewed.   Constitutional:       Appearance: Normal appearance.   HENT:      Mouth/Throat:      Mouth: Mucous membranes are moist.      Pharynx: No oropharyngeal exudate.   Eyes:      Extraocular Movements: Extraocular movements intact.      Conjunctiva/sclera: Conjunctivae normal.      Pupils: Pupils are equal, round, and reactive to light.   Cardiovascular:      Rate and Rhythm: Normal rate. Rhythm irregular.   Pulmonary:      Effort: Pulmonary effort is normal.      Breath sounds: Normal breath sounds.      Comments: Right upper chest permacath-nontender, no surrounding erythema  Abdominal:      Palpations: Abdomen is soft.      Tenderness: There is no abdominal tenderness.   Musculoskeletal:      Left lower leg: Edema present.      Comments: Necrotic ulceration at the distal phalanx of the left great toe surrounded by edema. Mild  erythema improving.  Left foot is tender to palpation   Skin:     General: Skin is warm and dry.   Neurological:      General: No focal deficit present.      Mental Status: He is alert and oriented to person, place, and time.      Cranial Nerves: No cranial nerve deficit.   Psychiatric:         Mood and Affect: Mood normal.         Behavior: Behavior normal.      Comments: Pleasant         Meds:    Current Facility-Administered Medications:   •  vancomycin  •  cefTRIAXone  •  acetaminophen  •  atorvastatin  •  calcitRIOL  •  calcium acetate  •  carvedilol  •  clopidogrel  •  methimazole  •  methimazole  •  omeprazole  •  MD Alert...Vancomycin per Pharmacy  •  heparin  •  heparin  •  insulin glargine **AND** insulin lispro **AND** POC Blood Glucose **AND** NOTIFY MD and PharmD **AND** glucose **AND** dextrose 50%    Labs:  Recent Labs     05/14/20  0852 05/15/20  0406 05/16/20  0245 05/17/20  0247   WBC 9.7 5.8 5.2 6.4   RBC 3.65* 3.23* 3.09* 3.25*   HEMOGLOBIN 12.0* 10.5* 9.8* 10.4*   HEMATOCRIT 34.1* 30.4* 29.5* 30.6*   MCV 93.4 94.1 95.5 94.2   MCH 32.9 32.5 31.7 32.0   RDW 47.7 49.5 50.0 49.1   PLATELETCT 165 155* 150* 163*   MPV 8.8* 9.0 9.1 9.3   NEUTSPOLYS 86.30* 74.50* 60.70  --    LYMPHOCYTES 7.30* 12.30* 22.80  --    MONOCYTES 5.50 12.20 15.70*  --    EOSINOPHILS 0.20 0.20 0.20  --    BASOPHILS 0.30 0.30 0.20  --      Recent Labs     05/15/20  0406 05/16/20  0245 05/17/20  0247   SODIUM 127* 135 132*   POTASSIUM 3.8 4.3 3.9   CHLORIDE 92* 96 94*   CO2 23 23 26   GLUCOSE 142* 141* 110*   BUN 26* 44* 25*     Recent Labs     05/14/20  0852 05/15/20  0406 05/16/20 0245 05/17/20  0247   ALBUMIN 4.0 3.5 3.3 3.5   TBILIRUBIN 0.8 0.5  --  0.4   ALKPHOSPHAT 147* 118*  --  108*   TOTPROTEIN 7.3 6.4  --  6.5   ALTSGPT 13 12  --  10   ASTSGOT 13 17  --  14   CREATININE 6.05* 3.99* 5.75* 3.66*       Imaging:  Dx-chest-portable (1 View)    Result Date: 5/14/2020 5/14/2020 9:00 AM HISTORY/REASON FOR EXAM:  Sepsis.  TECHNIQUE/EXAM DESCRIPTION AND NUMBER OF VIEWS: Single portable view of the chest. COMPARISON: 2/17/2020 FINDINGS: LUNGS: Chronic scarring in the left lung base. No new consolidation. No effusions. PNEUMOTHORAX: None. LINES AND TUBES: Stable right IJ central catheter position. MEDIASTINUM: No cardiomegaly. MUSCULOSKELETAL STRUCTURES: No acute displaced fracture.     Chronic scarring in the left lung base. No new consolidation of pleural effusions.    Dx-foot-2- Left    Result Date: 4/21/2020 4/21/2020 7:36 PM HISTORY/REASON FOR EXAM:  diabetic foot ulcer Left great toe ulcer TECHNIQUE/EXAM DESCRIPTION AND NUMBER OF VIEWS: 2 views of the LEFT foot. COMPARISON:  None. FINDINGS: There is soft tissue gas at the tip of the great toe and in the deep soft tissues consistent with known ulcer. There are no destructive changes to diagnose osteomyelitis. There is no fracture. Alignment is normal. Midfoot and forefoot degenerative changes are present. There is calcification within the distal achilles tendon. There is extensive atherosclerotic plaque     1.  No radiographic evidence for osteomyelitis 2.  Gas within the soft tissues of the 1st digit consistent with known open ulcer 3.  osteoarthritis of the midfoot and forefoot 4.  Extensive atherosclerotic plaque    Dx-foot-complete 3+ Left    Result Date: 5/14/2020 5/14/2020 9:00 AM HISTORY/REASON FOR EXAM:  Left foot pain TECHNIQUE/EXAM DESCRIPTION AND NUMBER OF VIEWS: 3 views of the LEFT foot. COMPARISON:  4/21/2020. FINDINGS: BONE MINERALIZATION: Decreased. JOINTS: Mild to moderate multifocal osteoarthrosis. No erosions. FRACTURE: None. DISLOCATION: None. SOFT TISSUES: Soft tissue ulceration on the great toe.. Atherosclerosis. Plantar calcaneal spur.     1. No fracture or dislocation. No cortical destruction of the great toe to suggest osteomyelitis. 2. Mild to moderate multifocal osteoarthrosis.    Mr-foot-w/o Left    Result Date: 5/15/2020  5/15/2020 12:19 PM  HISTORY/REASON FOR EXAM:  Osteomyelitis suspected, diabetic; Frequent admissions for left great toe ulcer TECHNIQUE/EXAM DESCRIPTION: MRI of the LEFT foot without contrast. The study was performed on a [employer] 1.5 Gayle MRI scanner. T1 axial and sagittal, fast spin-echo T2 fat-suppressed axial and coronal, and fast inversion recovery sagittal images were obtained. COMPARISON:  X-ray 2020 FINDINGS: There is increased T2 and confluent decreased T1 signal in the distal aspect of the first distal phalanx, consistent with osteomyelitis. There is an overlying soft tissue wound. There are claw toe deformities. There is degenerative marrow edema in the midfoot, mild. There is edema in the intrinsic musculature of the foot. There is subcutaneous edema, most pronounced in the dorsum of the forefoot. The visualized flexor and extensor tendons are intact.     Abnormal signal in the distal aspect of the first distal phalanx is consistent with osteomyelitis. Overlying soft tissue wound.    Us-zia Single Level Bilat    Result Date: 2020   Vascular Laboratory  Conclusions  There is no evidence of major arterial disease demonstrated.  KLARISSA BRADSHAW  Age:    74    Gender:     M  MRN:    [id number]  :    1945      BSA:  Exam Date:     2020 17:00  Room #:     Inpatient  Priority:     Stat  Ht (in):             Wt (lb):  Ordering Physician:        MILADYS YARBROUGH  Referring Physician:       [id number] Lizarraga  Sonographer:               Arlene Vivas RVT  Study Type:                Complete Bilateral  Technical Quality:         Adequate  Indications:     Ulcer of other part of foot (toes)  CPT Codes:       77140  ICD Codes:       707.15  History:         Ulcer of left great toe for 3 days or more. Diabetes                   mellitus. No prior exam.  Limitations:     New fistula in left arm.                 RIGHT  Waveform            Systolic BPs (mmHg)                              148           Brachial  Triphasic                                Common Femoral  Monophasic                 0             Posterior Tibial  Monophasic                 171           Dorsalis Pedis                                           Peroneal                             1.16          PATSY                                           TBI                       LEFT  Waveform        Systolic BPs (mmHg)                                           Brachial  Triphasic                                Common Femoral  Hyperemic                  141           Posterior Tibial  Hyperemic                  167           Dorsalis Pedis                                           Peroneal                             1.13          PATSY                                           TBI  Findings  Right.  Evidence of calcified arteries on the left side make assessment unreliable.  Doppler waveforms of the common femoral and popliteal arteries are of high  amplitude and triphasic.  Doppler waveforms of the posterior tibial and dorsalis pedis arteries are  monophasic.  No toe brachial index can be obtained due to no flow detected by  photoplethysmographpy.  Left.  Ankle brachial index is within normal limits.  Doppler waveforms of the common femoral and popliteal arteries are of high  amplitude and triphasic.  Doppler waveforms of the posterior tibial and dorsalis pedis arteries are  hyperemic.  No toe brachial index can be obtained due to no flow detected by  photoplethysmographpy.  Additional testing was not performed.  William Scott MD  (Electronically Signed)  Final Date:      21 April 2020                   17:39    Us-extremity Artery Lower Bilat    Result Date: 4/22/2020  Lower Extremity  Arterial Duplex Report  Vascular Laboratory  CONCLUSIONS  There is calcific plaque seen throughout the right lower extremity.  Plaque is especially dense in the right tibial and peroneal arteries.  Unable to visualize the right peroneal artery due  to calcific shadowing  from the posterior and anterior tibial arteries.  No focal stenosis is seen in right lower extremity arterial system although  there is suspicion for small vessel disease.  The left posterior tibial artery appears to be occluded throughout.  There are multiple tandem stenosis seen in the left proximal anterior  tibial artery. All stenotic velocities are consistent with > 75%stenosis.  Stenosis of the left distal anterior tibial artery seen. Velocities are  consistent with > 75%stenosis.  KLARISSA BRADSHAW  Exam Date:     2020 12:57  Room #:     Inpatient  Priority:     Routine  Ht (in):             Wt (lb):  Ordering Physician:        GINGER FRAZIER  Referring Physician:       [id number] Miguel  Sonographer:               Patricia Wheatley RVT  Study Type:                Complete Bilateral  Technical Quality:         Adequate  Age:    74    Gender:     M  MRN:    [id number]  :    1945      BSA:  Indications:     Atherosclerosis of native arteries of left leg with                   ulceration of unspecified site  CPT Codes:       44391  ICD Codes:       I70.249  History:         Atherosclerosis with wound on the left great toe. PATSY done                   20.  Limitations:                RIGHT  Waveform        Peak Systolic Velocity (cm/s)                  Prox    Prox-Mid  Mid    Mid-Dist  Distal  Biphasic                          97                       CFA  Biphasic        63                                         PFA  Biphasic        79                102              70      SFA  Biphasic        65                                 59      POP  Monophasic      109               82               73      AT  Monophasic      58                                 59      PT  Not                                                        LISA  attained                LEFT  Waveform        Peak Systolic Velocity (cm/s)                   Prox    Prox-Mid  Mid    Mid-Dist  Distal  Biphasic                          116                      CFA  Biphasic        83                                         PFA  Biphasic        77                121              78      SFA  Biphasic        87                                 78      POP  Monophasic      91       326      67               306     AT  Absent          0                                  0       PT  Not                                                        LISA  attained  FINDINGS  Right.  Calcific plaque is seen throughout the right lower extremity. Plaque is  especially dense in the tibial and peroneal arteries.  Unable to visualize the right peroneal artery due to calcific shadowing  from the posterior and anterior tibial arteries.  No focal stenosis is seen.  Waveforms are monophasic at the distal anterior and posterior tibial  arteries.  Left.  Calcific plaque is seen throughout the right lower extremity. Plaque is  especially dense in the tibial and peroneal arteries.  Unable to visualize the right peroneal artery due to calcific shadowing  from the posterior and anterior tibial arteries.  The posterior tibial artery appears to be occluded throughout.  There are multiple tandem stenosis seen in the proximal anterior tibial  artery. All stenotic velocities are consistent with > 75%stenosis.  Stenosis of the distal anterior tibial artery. Velocities are consistent  with > 75%stenosis.  Waveforms at the distal anterior tibial artery are monophasic.  Danny RODRIGUEZ To  (Electronically Signed)  Final Date:      22 April 2020                   14:06    Us-extremity Venous Lower Unilat Left    Result Date: 4/22/2020   Vascular Laboratory  CONCLUSIONS  No e/o LLE DVT.  SQ edema  KLARISSA BRADSHAW  Exam Date:     04/22/2020 04:02  Room #:     Inpatient  Priority:     Routine  Ht (in):             Wt (lb):  Ordering Physician:        FLOR SALEH  Referring Physician:       [id number]   Sonographer:               Markos Aguila [facility]  Study Type:                Complete Unilateral  Technical Quality:         Adequate  Age:    74    Gender:     M  MRN:    [id number]  :    1945      BSA:  Indications:     Localized swelling, mass and lump, left lower limb  CPT Codes:       64742  ICD Codes:       R22.42  History:         No Previous LEV, Left lower extremity swelling/edema  Limitations:  PROCEDURES:  Left lower extremity venous duplex imaging.  The following venous structures were evaluated: common femoral vein,  profunda vein, proximal portion of the greater saphenous vein, superficial  femoral vein, and the popliteal vein.  In addition, the posterior tibial and peroneal veins were evaluated.  Serial compression, augmentation maneuvers, and spectral Doppler flow  evaluation were performed.  FINDINGS:  Left lower extremity -.  The veins of the left lower extremity are readily compressible with normal  venous flow dynamics including spontaneous flow, respiratory phasic  variation and augmentation.  There is subcutaneous left lower extremity edema noted.  Contralateral flow was obtained in the right common femoral vein and  appears to be normal.  No evidence of deep vein thrombosis or superficial thrombophlebitis in the  left lower extremity.  Mario Chan MD  (Electronically Signed)  Final Date:      2020                   06:11    Ec-echocardiogram Complete W/o Cont    Result Date: 2020  Transthoracic Echo Report Echocardiography Laboratory CONCLUSIONS Prior echo done on 2019. Mildly reduced left ventricular systolic function. Left ventricular ejection fraction is visually estimated to be 45%. No evidence of valvular abnormality based on Doppler evaluation. Compared to the images of the prior study done -  there has been no significant change. KLARISSA BRADSHAW Exam Date:         2020                    02:00 Exam Location:     Inpatient Priority:          Routine Ordering  Physician:        FLORI COLON Referring Physician:       [id number] Dougherty Sonographer:               MEAGAN Woodruff Age:    74     Gender:    M MRN:    [id number] :    1945 BSA:    1.82   Ht (in):    65     Wt (lb):    165 Exam Type:     Complete Indications:     Encounter for screening for cardiovascular disorders ICD Codes:       Z13.6 CPT Codes:       63485 BP:   162    /   54     HR:   85 Technical Quality:       Fair MEASUREMENTS  (Male / Female) Normal Values 2D ECHO LV Diastolic Diameter PLAX        5.3 cm                4.2 - 5.9 / 3.9 - 5.3 cm LV Systolic Diameter PLAX         3.8 cm                2.1 - 4.0 cm IVS Diastolic Thickness           1.1 cm                LVPW Diastolic Thickness          1.1 cm                LVOT Diameter                     2 cm                  Estimated LV Ejection Fraction    45 %                  LV Ejection Fraction MOD BP       41.3 %                >= 55  % LV Ejection Fraction MOD 4C       35.1 %                LV Ejection Fraction MOD 2C       53.2 %                IVC Diameter                      2.1 cm                DOPPLER AV Peak Velocity                  1.5 m/s               AV Peak Gradient                  8.9 mmHg              AV Mean Gradient                  5.7 mmHg              LVOT Peak Velocity                0.96 m/s              AV Area Cont Eq vti               1.8 cm2               Mitral E Point Velocity           0.77 m/s              Mitral E to A Ratio               0.95                  MV Pressure Half Time             51 ms                 MV Area PHT                       4.3 cm2               MV Deceleration Time              176 ms                * Indicates values subject to auto-interpretation LV EF:  45    % FINDINGS Left Ventricle Normal left ventricular chamber size. Mild concentric left ventricular hypertrophy. Mildly reduced left ventricular systolic function. Left ventricular ejection fraction is visually estimated to be  45%. Normal regional wall motion. Normal diastolic function. Right Ventricle Normal right ventricular size. Normal right ventricular systolic function. Right Atrium Normal right atrial size. Normal inferior vena cava size and inspiratory collapse. Left Atrium Normal left atrial size. Left atrial volume index is 28 mL/sq m. Mitral Valve Structurally normal mitral valve. No mitral stenosis. No mitral regurgitation. Aortic Valve Structurally normal aortic valve. No aortic stenosis. No aortic insufficiency. Tricuspid Valve Structurally normal tricuspid valve. No tricuspid stenosis. Trace tricuspid regurgitation. Right atrial pressure is estimated to be 3 mmHg. Unable to estimate pulmonary artery pressure due to an inadequate tricuspid regurgitant jet. Pulmonic Valve Structurally normal pulmonic valve. No pulmonic stenosis. No pulmonic insufficiency. Pericardium No pericardial effusion seen. Aorta Normal aortic root for body surface area. Ascending aorta diameter is 2.9 cm. Danny rUeña (Electronically Signed) Final Date:     22 April 2020                 09:47    Us-thyroid    Result Date: 5/11/2020 5/11/2020 11:08 AM HISTORY/REASON FOR EXAM:  Hyperthyroid, follow-up TECHNIQUE/EXAM DESCRIPTION: Ultrasound of the soft tissues of the head and neck. COMPARISON:  Thyroid ultrasound 8/14/2018 FINDINGS: The thyroid gland is heterogeneous. Vascularity is normal. The right lobe of the thyroid gland measures 4.91 x 1.7 x 1.8 cm. The left lobe of the thyroid gland measures 3.89 x 1.59 x 1.43 cm. The isthmus measures 0.62 cm. Nodules >= 1cm: 1 Nodule #1 Location:  Right  upper Size:  1.37 x 1.07 x 0.64 cm Composition:  Solid-2 Echogenicity:  Hypoechoic-2 Shape:  Wider than tall-0 Margins:  Smooth-0 Echogenic Foci:  None-0 ACR TIRADS points/category:  4 - TR4 - Moderately Suspicious     1.  Unchanged solid right upper pole thyroid nodule (measurements are different on the current and prior examination but remain  "measurements by myself confirm unchanged in size) 2.  This nodule was previously biopsied and showed \"atypia of unknown significance\" ACR TI-RADS Recommendations TR4 - FNA recommended although whether FNA should be repeated depends upon oncologic surgeon's input Recommendations based on the American College of Radiology Thyroid imaging, reporting and Data System (TI-RADS) 2017. INTERPRETING LOCATION: [address] [place] [address], [address]      Micro:  Results     Procedure Component Value Units Date/Time    URINE CULTURE(NEW) [[id number]] Collected:  05/15/20 0048    Order Status:  Completed Specimen:  Urine Updated:  05/17/20 0635     Significant Indicator NEG     Source UR     Site -     Culture Result No growth at 48 hours.    Narrative:       Indication for culture:->Emergency Room Patient  Indication for culture:->Emergency Room Patient    COVID/SARS CoV-2 [[id number]]     Order Status:  Canceled Specimen:  Respirate from Nasopharyngeal     BLOOD CULTURE [[id number]] Collected:  05/14/20 0852    Order Status:  Completed Specimen:  Blood from Peripheral Updated:  05/15/20 0652     Significant Indicator NEG     Source BLD     Site PERIPHERAL     Culture Result No Growth  Note: Blood cultures are incubated for 5 days and  are monitored continuously.Positive blood cultures  are called to the RN and reported as soon as  they are identified.      Narrative:       Per Hospital Policy: Only change Specimen Src: to \"Line\" if  specified by physician order.  Right Forearm/Arm    BLOOD CULTURE [[id number]] Collected:  05/14/20 0927    Order Status:  Completed Specimen:  Blood from Peripheral Updated:  05/15/20 0652     Significant Indicator NEG     Source BLD     Site PERIPHERAL     Culture Result No Growth  Note: Blood cultures are incubated for 5 days and  are monitored continuously.Positive blood cultures  are called to the RN and reported as soon as  they are identified.      Narrative:       Per Hospital Policy: Only " "change Specimen Src: to \"Line\" if  specified by physician order.  Right Hand    URINALYSIS [[id number]]  (Abnormal) Collected:  05/15/20 0048    Order Status:  Completed Specimen:  Urine Updated:  05/15/20 0103     Color Yellow     Character Clear     Specific Gravity 1.015     Ph 8.5     Glucose 250 mg/dL      Ketones Negative mg/dL      Protein 300 mg/dL      Bilirubin Negative     Urobilinogen, Urine 1.0     Nitrite Negative     Leukocyte Esterase Negative     Occult Blood Negative     Micro Urine Req Microscopic    Narrative:       Indication for culture:->Emergency Room Patient    CULTURE TISSUE W/ GRM STAIN [[id number]]     Order Status:  Canceled Specimen:  Other Tissue     SARS-CoV-2, PCR (In-House) [[id number]] Collected:  05/14/20 0900    Order Status:  Completed Updated:  05/14/20 1002     SARS-CoV-2 Source NP Swab     SARS-CoV-2 by PCR NotDetected     Comment: [facility] providers: PLEASE REFER TO DE-ESCALATION AND RETESTING PROTOCOL  on [email or web address]  **The [employer] GeneXpert Xpress SARS-CoV-2 Test has been made available for  use under the Emergency Use Authorization (EUA) only.         COVID/SARS CoV-2 [[id number]] Collected:  05/14/20 0900    Order Status:  Completed Specimen:  Respirate from Nasopharyngeal Updated:  05/14/20 0907     COVID Order Status Received          Assessment:  Active Hospital Problems    Diagnosis   • *Osteomyelitis of foot, left, acute ([facility]) [M86.172]   • Peripheral vascular disease ([facility]) [I73.9]   • Paroxysmal A-fib ([facility]) [I48.0]   • Diabetes mellitus type 2 in nonobese ([facility]ly [facility]) [E11.9]   • End stage renal disease on dialysis ([facility]) [N18.6, Z99.2]   • Anemia of chronic disease [D63.8]   • Essential hypertension [I10]   • Hyperthyroidism [E05.90]   • Chronic anticoagulation [Z79.01]   • Secondary hyperparathyroidism of renal origin ([facility]) [N25.81]       Plan:  Left great toe osteomyelitis  MRI+OM  LPS following   Blood cultures on 5/14-negative to date  Plan for left great toe " amputation today by Dr. Lynn  Continue IV vancomycin and ceftriaxone 2 g daily  Duration of antibiotics will depend on extent of surgery and operative findings    Type 2 diabetes mellitus  Contributing to above  Last hemoglobin A1c 5.8 on 4/1/2020    End-stage renal disease on hemodialysis    Anemia of chronic disease  Secondary to above    Atrial fibrillation  On apixaban    Peripheral vascular disease  Status post left lower extremity angiogram with angioplasty and thrombectomy on 4/24/2020    Discussed with internal medicine [facility] resident Dr. Pineda .

## 2020-05-17 NOTE — OP REPORT
DATE OF SERVICE:  05/17/2020    PREOPERATIVE DIAGNOSES:  1.  Left great toe gangrene.  2.  Left flexible claw toes 2 through 5.    POSTOPERATIVE DIAGNOSES:  1.  Left great toe gangrene.  2.  Left flexible claw toes 2 through 5.    PROCEDURE PERFORMED:  1.  Left first toe amputation.  2.  Left percutaneous flexor tendon releases 2 through 5.    SURGEON:  Leobardo Lynn MD    FIRST ASSISTANT:  Lane Coronel MD    SECOND ASSISTANT:  Kamryn Birmingham.    ANESTHESIA:  General endotracheal with ankle block.    ESTIMATED BLOOD LOSS:  None.    COMPLICATIONS:  None.    POSTOPERATIVE PLAN:  1.  Weightbearing as tolerated.  2.  Perioperative antibiotics.  3.  Follow up at [facility].    INDICATIONS:  Please see my consult note.    PROCEDURE IN DETAIL:  The patient was brought to the operating room and   underwent general endotracheal anesthesia without complications.  Left lower   extremity was prepped and draped in standard fashion in supine position with   all appropriate padding.  Positive site verification confirmed his left lower   extremity as well as the above procedure and confirmation that he received   preoperative antibiotics.  Esmarch was used to exsanguinate his foot and ankle   and leg tourniquet was inflated to 250 mmHg.  A fishmouth incision was made   over his great toe.  It was brought directly down with full-thickness flaps to   the proximal phalanx.  Microsagittal saw was used to remove the distal half   of the proximal phalanx with a plantar bevel.  The wound was irrigated out.    Vancomycin powder was placed after hemostasis was obtained.  It was closed   with interrupted nylon suture.    Using a 15 blade, stab incision was just made proximal to distal flexor crease   on the second toe.  This released the flexor digitorum longus.  Exact   procedure was performed on the 3rd, 4th, and 5th toes without deviations or   complications.  Next Sterile dressings were applied.  Tourniquet released.     All toes were pink.  He was transferred to recovery room in good condition.    Utilization of Dr. Coronel was necessary for patient positioning, holding,   retracting, wound closure, and dressing placement.  He was present throughout   the entire procedure.       ____________________________________     MD VIC WATT / DALILA    DD:  05/17/2020 14:09:37  DT:  05/17/2020 14:37:19    D#:  [id number]  Job#:  [id number]

## 2020-05-17 NOTE — CONSULTS
DATE OF SERVICE:  05/17/2020    REASON FOR CONSULTATION:  Left foot infection.    HISTORY OF PRESENT ILLNESS:  The patient is a 74-year-old male who has   end-stage renal disease and peripheral artery disease with insulin-dependent   diabetes.  He was admitted to the hospital with worsening left foot infection.    There was concern for osteomyelitis of his great toe.  Overall, I feel as   though things are getting worse.  He tried a course of antibiotics, which was   unsuccessful.  He denies any fevers or chills.  He denies any significant   malaise.    PAST MEDICAL HISTORY, ALLERGIES, FAMILY HISTORY, SOCIAL HISTORY, AND REVIEW OF   SYSTEMS:  Reviewed on  ____ H and P dated 05/14/2020.    PHYSICAL EXAMINATION:  VITAL SIGNS:  Afebrile.  Vital signs are stable.  GENERAL:  Well-appearing male in no acute distress.  PSYCHIATRIC:  Pleasant demeanor.  Normal affect.  EYES:  Pupils equal, round.  RESPIRATIONS:  Regular rate.  Unlabored breathing.  CARDIOVASCULAR:  Palpable dorsalis pedis and posterior tibial pulse.  Regular   rate and rhythm.  Brisk capillary refill.  NEUROLOGIC:  Sensation is intact in all distribution of his dorsum.  He has   diminished sensations plantarly.  He has brisk capillary refill.  SKIN:  Shows a gangrene over his left great toe.  There is some mild swelling   and some mild erythema around the periphery.  MUSCULOSKELETAL:  Feet have good alignment.  He does have some flexible claw   toes of all of his lesser toes on the left.  He has no gross instability.  He   has satisfactory muscle strength.  He is nonambulatory.    LABS:  Glucose is 110.  White count 6.4.    IMAGING:  X-rays reviewed of left foot demonstrate no fracture or dislocation.    Some degenerative changes.  There is no cortical disruption suggesting   osteomyelitis.    IMPRESSION:  1.  Peripheral vascular disease.  2.  Diabetes.  3.  Dry gangrene, left great toe.  4.  Flexible claw toes 2 through 5.    PLAN:  I had a discussion  with the patient about his options including   operative and nonoperative.  He wants to proceed with operative intervention.    I have recommended a left first toe amputation with percutaneous flexor   tendon releases 2 through 5.  The above procedure was discussed in detail.    All questions were answered.  Risks of surgery were explained, which include   but not limited to wound problems, infection, nerve injury, vascular injury,   need for further surgery.  He understands he could have persistent risk of his   pain, infection, wound, and need for further surgery.  He understands and   accepts these risks and agreed to proceed.  We will schedule this next   available.       ____________________________________     MD VIC WATT / NTS    DD:  05/17/2020 14:06:58  DT:  05/17/2020 15:23:46    D#:  [id number]  Job#:  [id number]

## 2020-05-17 NOTE — ANESTHESIA PREPROCEDURE EVALUATION
Relevant Problems   CARDIAC   (+) CAD (coronary artery disease)   (+) Essential hypertension   (+) NSTEMI (non-ST elevated myocardial infarction) ([facility])   (+) Paroxysmal A-fib (HCC)   (+) Peripheral vascular disease (HCC)   (+) Presence of stent in LAD coronary artery      GI   (+) Gastroesophageal reflux disease         (+) End stage renal disease on dialysis (HCC)   (+) Hepatic cyst   (+) Renal cyst      ENDO   (+) Diabetes mellitus type 2 in nonobese (HCC)   (+) Hyperthyroidism      Other   (+) Osteomyelitis of foot, left, acute (HCC)   COPD    Physical Exam    Airway   Mallampati: II  TM distance: >3 FB  Neck ROM: full       Cardiovascular - normal exam  Rhythm: regular  Rate: normal  (-) murmur     Dental - normal exam           Pulmonary - normal exam  Breath sounds clear to auscultation     Abdominal    Neurological - normal exam                 Anesthesia Plan    ASA 3 (see relevant problem list)   ASA physical status 3 criteria: diabetes - poorly controlled    Plan - general       Airway plan will be LMA        Induction: intravenous    Postoperative Plan: Postoperative administration of opioids is intended.    Pertinent diagnostic labs and testing reviewed    Informed Consent:    Anesthetic plan and risks discussed with patient.    Use of blood products discussed with: patient whom consented to blood products.

## 2020-05-17 NOTE — PROGRESS NOTES
Pharmacy Kinetics 74 y.o. male on vancomycin day # 4 2020    Currently on Vancomycin Pulse Dosing d/t ESRD on HD  Dosing history:              : Vanco load 1900 mg IV at 0930  Provider specified end date: TBD     Indication for Treatment: Empiric - L foot infection/osteomyelitis     Pertinent history per medical record: Admitted on 2020 for fevers suspected d/t L foot infection. Patient with history of PVD and recent admission in April for L great toe ulcer. Vascular was consulted and patient ultimately underwent LLE angioplasty and thrombectomy. Imaging during that admission negative for osteomyelitis. Patient reports the wound has grown in size since discharge. He was referred to the ED by his podiatrist as he had also developed fevers PTA. Empiric antibiotics initiated for treatment of his L foot wound, LPS and ID consulting as MRI now suggestive of osteomyelitis.      Other antibiotics: ceftriaxone 2 g IV q24hrs     Allergies: Mircera [methoxy polyethylene glycol-epoetin beta]      Concern for renal function includes ESRD on HD (Tue//Sat schedule).     Pertinent cultures to date:   : peripheral blood cx X2 - NGTD     MRSA nares swab if pneumonia is a concern (ordered/positive/negative/n-a): N/A     Pertinent imagin/15: MRI L foot - Abnormal signal in the distal aspect of the first distal phalanx is consistent with osteomyelitis. Overlying soft tissue wound.  : L L foot xray - No fracture or dislocation. No cortical destruction of the great toe to suggest osteomyelitis.    Recent Labs     20  0852 05/15/20  0406 20  0245 20  0247   WBC 9.7 5.8 5.2 6.4   NEUTSPOLYS 86.30* 74.50* 60.70  --      Recent Labs     20  0852 05/15/20  0406 20  0245 20  0247   BUN 44* 26* 44* 25*   CREATININE 6.05* 3.99* 5.75* 3.66*   ALBUMIN 4.0 3.5 3.3 3.5     Recent Labs     20  0545   VANCOTROUGH 9.1*       Intake/Output Summary (Last 24 hours) at 2020  "0747  Last data filed at 2020 1919  Gross per 24 hour   Intake 1442 ml   Output 2300 ml   Net -858 ml      /57   Pulse 79   Temp 37.5 °C (99.5 °F) (Temporal)   Resp 18   Ht 1.676 m (5' 6\")   Wt 77.3 kg (170 lb 6.7 oz)   SpO2 99%  Temp (24hrs), Av.3 °C (99.2 °F), Min:37.1 °C (98.8 °F), Max:37.5 °C (99.5 °F)      A/P   1. Vancomycin dose change: pulse dose 1200mg (15mg/kg) at 0900  2. Next vancomycin level: random level in ~ 48hrs ( at 0300); ordered  3. Goal trough: 12-16 mcg/mL  4. Comments: Patient appears to be clearing faster than a q72hr dosing frequency based upon the subtherapeutic level this AM.  Will pulse dose once today and assess on q48hr level.  Plan remains for surgery.  ID following. Pharmacy will continue to monitor.    Karl Baker, PharmD, BCPS        "

## 2020-05-17 NOTE — PROGRESS NOTES
Report received from [facility] RNLISSETH. Assumed care of patient at 0710. Patient is A&Ox4, on room air. Plan of care discussed with patient and all questions answered. Patient expressing frustration about scheduled surgery. Patient states that he was unaware of toe amputation. Spoke to [facility] resident and pre-op nurse. Patient educated on plan of care by MD and all questions answered.  Fall precautions in place - bed is in low and locked position, call light/belongings within reach, bed alarm on. RN/CNA extensions provided, patient appropriately demonstrates how to call for assistance.

## 2020-05-17 NOTE — CARE PLAN
Problem: Communication  Goal: The ability to communicate needs accurately and effectively will improve  Outcome: PROGRESSING AS EXPECTED   Patient is A&Ox4, able to make needs known. Plan of care discussed with patient.     Problem: Psychosocial Needs:  Goal: Level of anxiety will decrease  Outcome: PROGRESSING AS EXPECTED  Patient expressing frustration in the lack of communication about the plan of care. MD at bedside to explain medications, labs, and scheduled procedures. Patient verbalizes understanding.

## 2020-05-17 NOTE — PROGRESS NOTES
POST-OP NOTE FOR LIMB PRESERVATION SERVICE    SURGERY DATE: 5/17/2020    PROCEDURE: Procedure(s):  AMPUTATION, TOE GREAT - Wound Class: Clean Contaminated  FLEXOR TENOTOMIES, TWO-FIVE TOES - Wound Class: Clean    WEIGHT BEARING STATUS: Weight bearing as tolerated in post op shoe    PT CONSULT: Yes    ANTIBIOTICS: Per ID recommendation    PLAN TO RETURN TO O.R.: No    WOUND CARE PLAN: May leave dressings on until follow up appointment, may reinforce as needed or change POD # 2 if saturated    FOLLOW-UP: Follow up with Dr. Lynn in 2 weeks    DURABLE MEDICAL EQUIPMENT: Crutches and or walker    OTHER: Clean wide margins with toe amputation, no purulence in wound, complete source control from surgery.  From an orthopedic standpoint he may discharge tomorrow when ambulatory.      Lane Coronel M.D.

## 2020-05-18 LAB
ALBUMIN SERPL BCP-MCNC: 3.6 G/DL (ref 3.2–4.9)
ALBUMIN/GLOB SERPL: 1.3 G/DL
ALP SERPL-CCNC: 116 U/L (ref 30–99)
ALT SERPL-CCNC: 8 U/L (ref 2–50)
ANION GAP SERPL CALC-SCNC: 15 MMOL/L (ref 7–16)
AST SERPL-CCNC: 16 U/L (ref 12–45)
BASOPHILS # BLD AUTO: 0.3 % (ref 0–1.8)
BASOPHILS # BLD: 0.02 K/UL (ref 0–0.12)
BILIRUB SERPL-MCNC: 0.4 MG/DL (ref 0.1–1.5)
BUN SERPL-MCNC: 41 MG/DL (ref 8–22)
CALCIUM SERPL-MCNC: 9.5 MG/DL (ref 8.5–10.5)
CHLORIDE SERPL-SCNC: 97 MMOL/L (ref 96–112)
CO2 SERPL-SCNC: 23 MMOL/L (ref 20–33)
CREAT SERPL-MCNC: 4.76 MG/DL (ref 0.5–1.4)
EOSINOPHIL # BLD AUTO: 0.26 K/UL (ref 0–0.51)
EOSINOPHIL NFR BLD: 3.9 % (ref 0–6.9)
ERYTHROCYTE [DISTWIDTH] IN BLOOD BY AUTOMATED COUNT: 50.5 FL (ref 35.9–50)
GLOBULIN SER CALC-MCNC: 2.7 G/DL (ref 1.9–3.5)
GLUCOSE BLD-MCNC: 122 MG/DL (ref 65–99)
GLUCOSE BLD-MCNC: 145 MG/DL (ref 65–99)
GLUCOSE BLD-MCNC: 154 MG/DL (ref 65–99)
GLUCOSE BLD-MCNC: 62 MG/DL (ref 65–99)
GLUCOSE SERPL-MCNC: 116 MG/DL (ref 65–99)
HCT VFR BLD AUTO: 29.6 % (ref 42–52)
HGB BLD-MCNC: 9.9 G/DL (ref 14–18)
IMM GRANULOCYTES # BLD AUTO: 0.04 K/UL (ref 0–0.11)
IMM GRANULOCYTES NFR BLD AUTO: 0.6 % (ref 0–0.9)
LYMPHOCYTES # BLD AUTO: 1.15 K/UL (ref 1–4.8)
LYMPHOCYTES NFR BLD: 17.2 % (ref 22–41)
MCH RBC QN AUTO: 32 PG (ref 27–33)
MCHC RBC AUTO-ENTMCNC: 33.4 G/DL (ref 33.7–35.3)
MCV RBC AUTO: 95.8 FL (ref 81.4–97.8)
MONOCYTES # BLD AUTO: 0.63 K/UL (ref 0–0.85)
MONOCYTES NFR BLD AUTO: 9.4 % (ref 0–13.4)
NEUTROPHILS # BLD AUTO: 4.6 K/UL (ref 1.82–7.42)
NEUTROPHILS NFR BLD: 68.6 % (ref 44–72)
NRBC # BLD AUTO: 0 K/UL
NRBC BLD-RTO: 0 /100 WBC
PHOSPHATE SERPL-MCNC: 3.9 MG/DL (ref 2.5–4.5)
PLATELET # BLD AUTO: 174 K/UL (ref 164–446)
PMV BLD AUTO: 9.2 FL (ref 9–12.9)
POTASSIUM SERPL-SCNC: 4.6 MMOL/L (ref 3.6–5.5)
PROT SERPL-MCNC: 6.3 G/DL (ref 6–8.2)
RBC # BLD AUTO: 3.09 M/UL (ref 4.7–6.1)
SODIUM SERPL-SCNC: 135 MMOL/L (ref 135–145)
WBC # BLD AUTO: 6.7 K/UL (ref 4.8–10.8)

## 2020-05-18 PROCEDURE — 99233 SBSQ HOSP IP/OBS HIGH 50: CPT | Performed by: INTERNAL MEDICINE

## 2020-05-18 PROCEDURE — A9270 NON-COVERED ITEM OR SERVICE: HCPCS | Performed by: STUDENT IN AN ORGANIZED HEALTH CARE EDUCATION/TRAINING PROGRAM

## 2020-05-18 PROCEDURE — 770006 HCHG ROOM/CARE - MED/SURG/GYN SEMI*

## 2020-05-18 PROCEDURE — 36415 COLL VENOUS BLD VENIPUNCTURE: CPT

## 2020-05-18 PROCEDURE — 84100 ASSAY OF PHOSPHORUS: CPT

## 2020-05-18 PROCEDURE — 700102 HCHG RX REV CODE 250 W/ 637 OVERRIDE(OP): Performed by: STUDENT IN AN ORGANIZED HEALTH CARE EDUCATION/TRAINING PROGRAM

## 2020-05-18 PROCEDURE — 99232 SBSQ HOSP IP/OBS MODERATE 35: CPT | Mod: GC | Performed by: INTERNAL MEDICINE

## 2020-05-18 PROCEDURE — 700102 HCHG RX REV CODE 250 W/ 637 OVERRIDE(OP): Performed by: HOSPITALIST

## 2020-05-18 PROCEDURE — 82962 GLUCOSE BLOOD TEST: CPT

## 2020-05-18 PROCEDURE — 80053 COMPREHEN METABOLIC PANEL: CPT

## 2020-05-18 PROCEDURE — 85025 COMPLETE CBC W/AUTO DIFF WBC: CPT

## 2020-05-18 RX ORDER — OXYCODONE HYDROCHLORIDE 5 MG/1
5 TABLET ORAL EVERY 4 HOURS PRN
Status: DISCONTINUED | OUTPATIENT
Start: 2020-05-18 | End: 2020-05-19 | Stop reason: HOSPADM

## 2020-05-18 RX ORDER — MORPHINE SULFATE 4 MG/ML
2 INJECTION, SOLUTION INTRAMUSCULAR; INTRAVENOUS ONCE
Status: DISCONTINUED | OUTPATIENT
Start: 2020-05-18 | End: 2020-05-18

## 2020-05-18 RX ORDER — HYDROCODONE BITARTRATE AND ACETAMINOPHEN 5; 325 MG/1; MG/1
1 TABLET ORAL ONCE
Status: COMPLETED | OUTPATIENT
Start: 2020-05-18 | End: 2020-05-18

## 2020-05-18 RX ORDER — OXYCODONE HYDROCHLORIDE 10 MG/1
10 TABLET ORAL EVERY 4 HOURS PRN
Status: DISCONTINUED | OUTPATIENT
Start: 2020-05-18 | End: 2020-05-19 | Stop reason: HOSPADM

## 2020-05-18 RX ADMIN — OXYCODONE HYDROCHLORIDE 10 MG: 10 TABLET ORAL at 13:11

## 2020-05-18 RX ADMIN — Medication 1334 MG: at 12:26

## 2020-05-18 RX ADMIN — Medication 1334 MG: at 17:42

## 2020-05-18 RX ADMIN — INSULIN LISPRO 1 UNITS: 100 INJECTION, SOLUTION INTRAVENOUS; SUBCUTANEOUS at 12:26

## 2020-05-18 RX ADMIN — ACETAMINOPHEN 650 MG: 325 TABLET, FILM COATED ORAL at 00:12

## 2020-05-18 RX ADMIN — Medication 1334 MG: at 08:35

## 2020-05-18 RX ADMIN — INSULIN GLARGINE 5 UNITS: 100 INJECTION, SOLUTION SUBCUTANEOUS at 17:42

## 2020-05-18 RX ADMIN — CARVEDILOL 3.12 MG: 3.12 TABLET, FILM COATED ORAL at 17:42

## 2020-05-18 RX ADMIN — ATORVASTATIN CALCIUM 40 MG: 40 TABLET, FILM COATED ORAL at 21:05

## 2020-05-18 RX ADMIN — CALCITRIOL CAPSULES 0.25 MCG 0.25 MCG: 0.25 CAPSULE ORAL at 05:18

## 2020-05-18 RX ADMIN — OXYCODONE 5 MG: 5 TABLET ORAL at 21:08

## 2020-05-18 RX ADMIN — APIXABAN 2.5 MG: 2.5 TABLET, FILM COATED ORAL at 18:24

## 2020-05-18 RX ADMIN — METHIMAZOLE 7.5 MG: 5 TABLET ORAL at 05:20

## 2020-05-18 RX ADMIN — CLOPIDOGREL BISULFATE 75 MG: 75 TABLET ORAL at 05:18

## 2020-05-18 RX ADMIN — HYDROCODONE BITARTRATE AND ACETAMINOPHEN 1 TABLET: 5; 325 TABLET ORAL at 04:18

## 2020-05-18 RX ADMIN — OXYCODONE 5 MG: 5 TABLET ORAL at 21:05

## 2020-05-18 RX ADMIN — OMEPRAZOLE 20 MG: 20 CAPSULE, DELAYED RELEASE ORAL at 21:05

## 2020-05-18 RX ADMIN — CARVEDILOL 3.12 MG: 3.12 TABLET, FILM COATED ORAL at 08:35

## 2020-05-18 RX ADMIN — METHIMAZOLE 5 MG: 5 TABLET ORAL at 17:43

## 2020-05-18 ASSESSMENT — ENCOUNTER SYMPTOMS
FLANK PAIN: 0
SORE THROAT: 0
PALPITATIONS: 0
NECK PAIN: 0
MYALGIAS: 1
ABDOMINAL PAIN: 0
FOCAL WEAKNESS: 0
SINUS PAIN: 0
SPEECH CHANGE: 0
DIAPHORESIS: 0
DIZZINESS: 0
VOMITING: 0
NERVOUS/ANXIOUS: 0
DEPRESSION: 0
FEVER: 0
MYALGIAS: 0
NAUSEA: 1
SPUTUM PRODUCTION: 0
CLAUDICATION: 0
BRUISES/BLEEDS EASILY: 0
HEMOPTYSIS: 0
DOUBLE VISION: 0
DIARRHEA: 0
CONSTIPATION: 0
BLOOD IN STOOL: 0
PHOTOPHOBIA: 0
HEADACHES: 0
FALLS: 0
SENSORY CHANGE: 1
CHILLS: 0
BLURRED VISION: 0
TINGLING: 0
COUGH: 0
NAUSEA: 0
SHORTNESS OF BREATH: 0
WEIGHT LOSS: 0

## 2020-05-18 ASSESSMENT — LIFESTYLE VARIABLES: SUBSTANCE_ABUSE: 0

## 2020-05-18 NOTE — ANESTHESIA POSTPROCEDURE EVALUATION
Patient: Luis Sepulveda    Procedure Summary     Date:  05/17/20 Room / Location:  Critical access hospital OR 06 / SURGERY [facility]    Anesthesia Start:  1330 Anesthesia Stop:  1415    Procedures:       AMPUTATION, TOE GREAT (Left Toe)      FLEXOR TENOTOMIES, TWO-FIVE TOES (Left Foot) Diagnosis:  (SAME)    Surgeon:  Leobardo Lynn M.D. Responsible Provider:  Jose Luis Denis M.D.    Anesthesia Type:  general ASA Status:  3          Final Anesthesia Type: general  Last vitals  BP   Blood Pressure : (!) 93/71, NIBP: (!) 84/39    Temp   36.7 °C (98 °F)    Pulse   Pulse: 74   Resp   17    SpO2   98 %      Anesthesia Post Evaluation    Patient location during evaluation: PACU  Patient participation: complete - patient participated  Level of consciousness: awake and alert  Pain score: 0    Airway patency: patent  Anesthetic complications: no  Cardiovascular status: hemodynamically stable  Respiratory status: acceptable  Hydration status: euvolemic    PONV: none           Nurse Pain Score: 0 (NPRS)

## 2020-05-18 NOTE — PROGRESS NOTES
Infectious Disease Progress Note    Author: Lillian Jordan M.D. Date & Time of service: 2020  8:58 AM    Chief Complaint:  Follow-up for left great toe osteomyelitis    Interval History:   afebrile WBC 6.4 plan for left great toe amputation today, left toe pain decreased. Tolerating IV abx without any issues   afebrile, WBC 6.7 left great toe amp yesterday, c/o post op pain 4/10 in severity. Did not sleep last night. No path or cultures sent    Labs Reviewed, Medications Reviewed and Wound Reviewed.    Review of Systems:  Review of Systems   Constitutional: Positive for malaise/fatigue. Negative for chills and fever.   Respiratory: Negative for cough and shortness of breath.    Cardiovascular: Negative for chest pain.   Gastrointestinal: Negative for abdominal pain, diarrhea, nausea and vomiting.   Genitourinary: Negative for dysuria.   Musculoskeletal: Positive for myalgias.   Neurological: Negative for dizziness and headaches.   All other systems reviewed and are negative.      Hemodynamics:  Temp (24hrs), Av.8 °C (98.2 °F), Min:36.4 °C (97.6 °F), Max:37.1 °C (98.8 °F)  Temperature: 37.1 °C (98.7 °F)  Pulse  Av.6  Min: 65  Max: 133   Blood Pressure : 119/41, NIBP: (!) 84/39       Physical Exam:  Physical Exam  Vitals signs and nursing note reviewed.   Constitutional:       Appearance: Normal appearance.   HENT:      Mouth/Throat:      Mouth: Mucous membranes are moist.      Pharynx: No oropharyngeal exudate.   Eyes:      Extraocular Movements: Extraocular movements intact.      Conjunctiva/sclera: Conjunctivae normal.      Pupils: Pupils are equal, round, and reactive to light.   Cardiovascular:      Rate and Rhythm: Normal rate. Rhythm irregular.   Pulmonary:      Effort: Pulmonary effort is normal.      Breath sounds: Normal breath sounds.      Comments: Right upper chest permacath-nontender, no surrounding erythema  Abdominal:      Palpations: Abdomen is soft.      Tenderness: There is  no abdominal tenderness.   Musculoskeletal:      Comments: Evidence of left great toe amp. Left foot in primary surgical bandage.   Skin:     General: Skin is warm and dry.   Neurological:      General: No focal deficit present.      Mental Status: He is alert and oriented to person, place, and time.      Cranial Nerves: No cranial nerve deficit.   Psychiatric:         Mood and Affect: Mood normal.         Behavior: Behavior normal.      Comments: Pleasant         Meds:    Current Facility-Administered Medications:   •  morphine injection  •  cefTRIAXone  •  acetaminophen  •  atorvastatin  •  calcitRIOL  •  calcium acetate  •  carvedilol  •  clopidogrel  •  methimazole  •  methimazole  •  omeprazole  •  MD Alert...Vancomycin per Pharmacy  •  heparin  •  heparin  •  insulin glargine **AND** insulin lispro **AND** POC Blood Glucose **AND** NOTIFY MD and PharmD **AND** glucose **AND** dextrose 50%    Labs:  Recent Labs     05/16/20 0245 05/17/20 0247 05/18/20 0257   WBC 5.2 6.4 6.7   RBC 3.09* 3.25* 3.09*   HEMOGLOBIN 9.8* 10.4* 9.9*   HEMATOCRIT 29.5* 30.6* 29.6*   MCV 95.5 94.2 95.8   MCH 31.7 32.0 32.0   RDW 50.0 49.1 50.5*   PLATELETCT 150* 163* 174   MPV 9.1 9.3 9.2   NEUTSPOLYS 60.70  --  68.60   LYMPHOCYTES 22.80  --  17.20*   MONOCYTES 15.70*  --  9.40   EOSINOPHILS 0.20  --  3.90   BASOPHILS 0.20  --  0.30     Recent Labs     05/16/20 0245 05/17/20 0247 05/18/20 0257   SODIUM 135 132* 135   POTASSIUM 4.3 3.9 4.6   CHLORIDE 96 94* 97   CO2 23 26 23   GLUCOSE 141* 110* 116*   BUN 44* 25* 41*     Recent Labs     05/16/20 0245 05/17/20 0247 05/18/20 0257   ALBUMIN 3.3 3.5 3.6   TBILIRUBIN  --  0.4 0.4   ALKPHOSPHAT  --  108* 116*   TOTPROTEIN  --  6.5 6.3   ALTSGPT  --  10 8   ASTSGOT  --  14 16   CREATININE 5.75* 3.66* 4.76*       Imaging:  Dx-chest-portable (1 View)    Result Date: 5/14/2020 5/14/2020 9:00 AM HISTORY/REASON FOR EXAM:  Sepsis. TECHNIQUE/EXAM DESCRIPTION AND NUMBER OF VIEWS: Single  portable view of the chest. COMPARISON: 2/17/2020 FINDINGS: LUNGS: Chronic scarring in the left lung base. No new consolidation. No effusions. PNEUMOTHORAX: None. LINES AND TUBES: Stable right IJ central catheter position. MEDIASTINUM: No cardiomegaly. MUSCULOSKELETAL STRUCTURES: No acute displaced fracture.     Chronic scarring in the left lung base. No new consolidation of pleural effusions.    Dx-foot-2- Left    Result Date: 4/21/2020 4/21/2020 7:36 PM HISTORY/REASON FOR EXAM:  diabetic foot ulcer Left great toe ulcer TECHNIQUE/EXAM DESCRIPTION AND NUMBER OF VIEWS: 2 views of the LEFT foot. COMPARISON:  None. FINDINGS: There is soft tissue gas at the tip of the great toe and in the deep soft tissues consistent with known ulcer. There are no destructive changes to diagnose osteomyelitis. There is no fracture. Alignment is normal. Midfoot and forefoot degenerative changes are present. There is calcification within the distal achilles tendon. There is extensive atherosclerotic plaque     1.  No radiographic evidence for osteomyelitis 2.  Gas within the soft tissues of the 1st digit consistent with known open ulcer 3.  osteoarthritis of the midfoot and forefoot 4.  Extensive atherosclerotic plaque    Dx-foot-complete 3+ Left    Result Date: 5/14/2020 5/14/2020 9:00 AM HISTORY/REASON FOR EXAM:  Left foot pain TECHNIQUE/EXAM DESCRIPTION AND NUMBER OF VIEWS: 3 views of the LEFT foot. COMPARISON:  4/21/2020. FINDINGS: BONE MINERALIZATION: Decreased. JOINTS: Mild to moderate multifocal osteoarthrosis. No erosions. FRACTURE: None. DISLOCATION: None. SOFT TISSUES: Soft tissue ulceration on the great toe.. Atherosclerosis. Plantar calcaneal spur.     1. No fracture or dislocation. No cortical destruction of the great toe to suggest osteomyelitis. 2. Mild to moderate multifocal osteoarthrosis.    Mr-foot-w/o Left    Result Date: 5/15/2020  5/15/2020 12:19 PM HISTORY/REASON FOR EXAM:  Osteomyelitis suspected, diabetic;  Frequent admissions for left great toe ulcer TECHNIQUE/EXAM DESCRIPTION: MRI of the LEFT foot without contrast. The study was performed on a [employer] Signa 1.5 Gayle MRI scanner. T1 axial and sagittal, fast spin-echo T2 fat-suppressed axial and coronal, and fast inversion recovery sagittal images were obtained. COMPARISON:  X-ray 2020 FINDINGS: There is increased T2 and confluent decreased T1 signal in the distal aspect of the first distal phalanx, consistent with osteomyelitis. There is an overlying soft tissue wound. There are claw toe deformities. There is degenerative marrow edema in the midfoot, mild. There is edema in the intrinsic musculature of the foot. There is subcutaneous edema, most pronounced in the dorsum of the forefoot. The visualized flexor and extensor tendons are intact.     Abnormal signal in the distal aspect of the first distal phalanx is consistent with osteomyelitis. Overlying soft tissue wound.    Us-zia Single Level Bilat    Result Date: 2020   Vascular Laboratory  Conclusions  There is no evidence of major arterial disease demonstrated.  KLARISSA BRADSHAW  Age:    74    Gender:     M  MRN:    [id number]  :    1945      BSA:  Exam Date:     2020 17:00  Room #:     Inpatient  Priority:     Stat  Ht (in):             Wt (lb):  Ordering Physician:        MILADYS YARBROUGH  Referring Physician:       [id number] Christopher  Sonographer:               Arlene Vivas RVT  Study Type:                Complete Bilateral  Technical Quality:         Adequate  Indications:     Ulcer of other part of foot (toes)  CPT Codes:       07543  ICD Codes:       707.15  History:         Ulcer of left great toe for 3 days or more. Diabetes                   mellitus. No prior exam.  Limitations:     New fistula in left arm.                 RIGHT  Waveform            Systolic BPs (mmHg)                             148           Brachial  Triphasic                                 Common Femoral  Monophasic                 0             Posterior Tibial  Monophasic                 171           Dorsalis Pedis                                           Peroneal                             1.16          PATSY                                           TBI                       LEFT  Waveform        Systolic BPs (mmHg)                                           Brachial  Triphasic                                Common Femoral  Hyperemic                  141           Posterior Tibial  Hyperemic                  167           Dorsalis Pedis                                           Peroneal                             1.13          PATSY                                           TBI  Findings  Right.  Evidence of calcified arteries on the left side make assessment unreliable.  Doppler waveforms of the common femoral and popliteal arteries are of high  amplitude and triphasic.  Doppler waveforms of the posterior tibial and dorsalis pedis arteries are  monophasic.  No toe brachial index can be obtained due to no flow detected by  photoplethysmographpy.  Left.  Ankle brachial index is within normal limits.  Doppler waveforms of the common femoral and popliteal arteries are of high  amplitude and triphasic.  Doppler waveforms of the posterior tibial and dorsalis pedis arteries are  hyperemic.  No toe brachial index can be obtained due to no flow detected by  photoplethysmographpy.  Additional testing was not performed.  William Scott MD  (Electronically Signed)  Final Date:      21 April 2020                   17:39    Us-extremity Artery Lower Bilat    Result Date: 4/22/2020  Lower Extremity  Arterial Duplex Report  Vascular Laboratory  CONCLUSIONS  There is calcific plaque seen throughout the right lower extremity.  Plaque is especially dense in the right tibial and peroneal arteries.  Unable to visualize the right peroneal artery due to calcific shadowing  from the posterior and anterior tibial  arteries.  No focal stenosis is seen in right lower extremity arterial system although  there is suspicion for small vessel disease.  The left posterior tibial artery appears to be occluded throughout.  There are multiple tandem stenosis seen in the left proximal anterior  tibial artery. All stenotic velocities are consistent with > 75%stenosis.  Stenosis of the left distal anterior tibial artery seen. Velocities are  consistent with > 75%stenosis.  KLARISSA BRADSHAW  Exam Date:     2020 12:57  Room #:     Inpatient  Priority:     Routine  Ht (in):             Wt (lb):  Ordering Physician:        GINGER FRAZIER  Referring Physician:       [id number] Miguel  Sonographer:               Patricia Wheatley RVT  Study Type:                Complete Bilateral  Technical Quality:         Adequate  Age:    74    Gender:     M  MRN:    [id number]  :    1945      BSA:  Indications:     Atherosclerosis of native arteries of left leg with                   ulceration of unspecified site  CPT Codes:       81420  ICD Codes:       I70.249  History:         Atherosclerosis with wound on the left great toe. PATSY done                   20.  Limitations:                RIGHT  Waveform        Peak Systolic Velocity (cm/s)                  Prox    Prox-Mid  Mid    Mid-Dist  Distal  Biphasic                          97                       CFA  Biphasic        63                                         PFA  Biphasic        79                102              70      SFA  Biphasic        65                                 59      POP  Monophasic      109               82               73      AT  Monophasic      58                                 59      PT  Not                                                        LISA  attained                LEFT  Waveform        Peak Systolic Velocity (cm/s)                  Prox    Prox-Mid  Mid    Mid-Dist  Distal  Biphasic                           116                      CFA  Biphasic        83                                         PFA  Biphasic        77                121              78      SFA  Biphasic        87                                 78      POP  Monophasic      91       326      67               306     AT  Absent          0                                  0       PT  Not                                                        LISA  attained  FINDINGS  Right.  Calcific plaque is seen throughout the right lower extremity. Plaque is  especially dense in the tibial and peroneal arteries.  Unable to visualize the right peroneal artery due to calcific shadowing  from the posterior and anterior tibial arteries.  No focal stenosis is seen.  Waveforms are monophasic at the distal anterior and posterior tibial  arteries.  Left.  Calcific plaque is seen throughout the right lower extremity. Plaque is  especially dense in the tibial and peroneal arteries.  Unable to visualize the right peroneal artery due to calcific shadowing  from the posterior and anterior tibial arteries.  The posterior tibial artery appears to be occluded throughout.  There are multiple tandem stenosis seen in the proximal anterior tibial  artery. All stenotic velocities are consistent with > 75%stenosis.  Stenosis of the distal anterior tibial artery. Velocities are consistent  with > 75%stenosis.  Waveforms at the distal anterior tibial artery are monophasic.  Danny RODRIGUEZ To  (Electronically Signed)  Final Date:      22 April 2020                   14:06    Us-extremity Venous Lower Unilat Left    Result Date: 4/22/2020   Vascular Laboratory  CONCLUSIONS  No e/o LLE DVT.  SQ edema  KLARISSA BRADSHAW  Exam Date:     04/22/2020 04:02  Room #:     Inpatient  Priority:     Routine  Ht (in):             Wt (lb):  Ordering Physician:        FLOR SALEH  Referring Physician:       [id number] Zhou  Sonographer:               Markos Aguila RDMS  Study Type:                 Complete Unilateral  Technical Quality:         Adequate  Age:    74    Gender:     M  MRN:    [id number]  :    1945      BSA:  Indications:     Localized swelling, mass and lump, left lower limb  CPT Codes:       94037  ICD Codes:       R22.42  History:         No Previous LEV, Left lower extremity swelling/edema  Limitations:  PROCEDURES:  Left lower extremity venous duplex imaging.  The following venous structures were evaluated: common femoral vein,  profunda vein, proximal portion of the greater saphenous vein, superficial  femoral vein, and the popliteal vein.  In addition, the posterior tibial and peroneal veins were evaluated.  Serial compression, augmentation maneuvers, and spectral Doppler flow  evaluation were performed.  FINDINGS:  Left lower extremity -.  The veins of the left lower extremity are readily compressible with normal  venous flow dynamics including spontaneous flow, respiratory phasic  variation and augmentation.  There is subcutaneous left lower extremity edema noted.  Contralateral flow was obtained in the right common femoral vein and  appears to be normal.  No evidence of deep vein thrombosis or superficial thrombophlebitis in the  left lower extremity.  Mario Chan MD  (Electronically Signed)  Final Date:      2020                   06:11    Ec-echocardiogram Complete W/o Cont    Result Date: 2020  Transthoracic Echo Report Echocardiography Laboratory CONCLUSIONS Prior echo done on 2019. Mildly reduced left ventricular systolic function. Left ventricular ejection fraction is visually estimated to be 45%. No evidence of valvular abnormality based on Doppler evaluation. Compared to the images of the prior study done -  there has been no significant change. KLARISSA BRADSHAW Exam Date:         2020                    02:00 Exam Location:     Inpatient Priority:          Routine Ordering Physician:        FLORI COLON Referring Physician:        [id number], [facility] Sonographer:               MEAGAN Woodruff Age:    74     Gender:    M MRN:    [id number] :    1945 BSA:    1.82   Ht (in):    65     Wt (lb):    165 Exam Type:     Complete Indications:     Encounter for screening for cardiovascular disorders ICD Codes:       Z13.6 CPT Codes:       17485 BP:   162    /   54     HR:   85 Technical Quality:       Fair MEASUREMENTS  (Male / Female) Normal Values 2D ECHO LV Diastolic Diameter PLAX        5.3 cm                4.2 - 5.9 / 3.9 - 5.3 cm LV Systolic Diameter PLAX         3.8 cm                2.1 - 4.0 cm IVS Diastolic Thickness           1.1 cm                LVPW Diastolic Thickness          1.1 cm                LVOT Diameter                     2 cm                  Estimated LV Ejection Fraction    45 %                  LV Ejection Fraction MOD BP       41.3 %                >= 55  % LV Ejection Fraction MOD 4C       35.1 %                LV Ejection Fraction MOD 2C       53.2 %                IVC Diameter                      2.1 cm                DOPPLER AV Peak Velocity                  1.5 m/s               AV Peak Gradient                  8.9 mmHg              AV Mean Gradient                  5.7 mmHg              LVOT Peak Velocity                0.96 m/s              AV Area Cont Eq vti               1.8 cm2               Mitral E Point Velocity           0.77 m/s              Mitral E to A Ratio               0.95                  MV Pressure Half Time             51 ms                 MV Area PHT                       4.3 cm2               MV Deceleration Time              176 ms                * Indicates values subject to auto-interpretation LV EF:  45    % FINDINGS Left Ventricle Normal left ventricular chamber size. Mild concentric left ventricular hypertrophy. Mildly reduced left ventricular systolic function. Left ventricular ejection fraction is visually estimated to be 45%. Normal regional wall motion. Normal diastolic  function. Right Ventricle Normal right ventricular size. Normal right ventricular systolic function. Right Atrium Normal right atrial size. Normal inferior vena cava size and inspiratory collapse. Left Atrium Normal left atrial size. Left atrial volume index is 28 mL/sq m. Mitral Valve Structurally normal mitral valve. No mitral stenosis. No mitral regurgitation. Aortic Valve Structurally normal aortic valve. No aortic stenosis. No aortic insufficiency. Tricuspid Valve Structurally normal tricuspid valve. No tricuspid stenosis. Trace tricuspid regurgitation. Right atrial pressure is estimated to be 3 mmHg. Unable to estimate pulmonary artery pressure due to an inadequate tricuspid regurgitant jet. Pulmonic Valve Structurally normal pulmonic valve. No pulmonic stenosis. No pulmonic insufficiency. Pericardium No pericardial effusion seen. Aorta Normal aortic root for body surface area. Ascending aorta diameter is 2.9 cm. Danny RODRIGUEZ To (Electronically Signed) Final Date:     22 April 2020                 09:47    Us-thyroid    Result Date: 5/11/2020 5/11/2020 11:08 AM HISTORY/REASON FOR EXAM:  Hyperthyroid, follow-up TECHNIQUE/EXAM DESCRIPTION: Ultrasound of the soft tissues of the head and neck. COMPARISON:  Thyroid ultrasound 8/14/2018 FINDINGS: The thyroid gland is heterogeneous. Vascularity is normal. The right lobe of the thyroid gland measures 4.91 x 1.7 x 1.8 cm. The left lobe of the thyroid gland measures 3.89 x 1.59 x 1.43 cm. The isthmus measures 0.62 cm. Nodules >= 1cm: 1 Nodule #1 Location:  Right  upper Size:  1.37 x 1.07 x 0.64 cm Composition:  Solid-2 Echogenicity:  Hypoechoic-2 Shape:  Wider than tall-0 Margins:  Smooth-0 Echogenic Foci:  None-0 ACR TIRADS points/category:  4 - TR4 - Moderately Suspicious     1.  Unchanged solid right upper pole thyroid nodule (measurements are different on the current and prior examination but remain measurements by myself confirm unchanged in size) 2.  This  "nodule was previously biopsied and showed \"atypia of unknown significance\" ACR TI-RADS Recommendations TR4 - FNA recommended although whether FNA should be repeated depends upon oncologic surgeon's input Recommendations based on the American College of Radiology Thyroid imaging, reporting and Data System (TI-RADS) 2017. INTERPRETING LOCATION: [address] [place], [place] [address], [address]      Micro:  Results     Procedure Component Value Units Date/Time    URINE CULTURE(NEW) [[id number]] Collected:  05/15/20 0048    Order Status:  Completed Specimen:  Urine Updated:  05/17/20 0635     Significant Indicator NEG     Source UR     Site -     Culture Result No growth at 48 hours.    Narrative:       Indication for culture:->Emergency Room Patient  Indication for culture:->Emergency Room Patient    COVID/SARS CoV-2 [[id number]]     Order Status:  Canceled Specimen:  Respirate from Nasopharyngeal     BLOOD CULTURE [[id number]] Collected:  05/14/20 0852    Order Status:  Completed Specimen:  Blood from Peripheral Updated:  05/15/20 0652     Significant Indicator NEG     Source BLD     Site PERIPHERAL     Culture Result No Growth  Note: Blood cultures are incubated for 5 days and  are monitored continuously.Positive blood cultures  are called to the RN and reported as soon as  they are identified.      Narrative:       Per Hospital Policy: Only change Specimen Src: to \"Line\" if  specified by physician order.  Right Forearm/Arm    BLOOD CULTURE [[id number]] Collected:  05/14/20 0927    Order Status:  Completed Specimen:  Blood from Peripheral Updated:  05/15/20 0652     Significant Indicator NEG     Source BLD     Site PERIPHERAL     Culture Result No Growth  Note: Blood cultures are incubated for 5 days and  are monitored continuously.Positive blood cultures  are called to the RN and reported as soon as  they are identified.      Narrative:       Per Hospital Policy: Only change Specimen Src: to \"Line\" if  specified by physician " order.  Right Hand    URINALYSIS [[id number]]  (Abnormal) Collected:  05/15/20 0048    Order Status:  Completed Specimen:  Urine Updated:  05/15/20 0103     Color Yellow     Character Clear     Specific Gravity 1.015     Ph 8.5     Glucose 250 mg/dL      Ketones Negative mg/dL      Protein 300 mg/dL      Bilirubin Negative     Urobilinogen, Urine 1.0     Nitrite Negative     Leukocyte Esterase Negative     Occult Blood Negative     Micro Urine Req Microscopic    Narrative:       Indication for culture:->Emergency Room Patient    CULTURE TISSUE W/ GRM STAIN [[id number]]     Order Status:  Canceled Specimen:  Other Tissue     SARS-CoV-2, PCR (In-House) [[id number]] Collected:  05/14/20 0900    Order Status:  Completed Updated:  05/14/20 1002     SARS-CoV-2 Source NP Swab     SARS-CoV-2 by PCR NotDetected     Comment: [facility] providers: PLEASE REFER TO DE-ESCALATION AND RETESTING PROTOCOL  on [email or web address]  **The [employer] GeneXpert Xpress SARS-CoV-2 Test has been made available for  use under the Emergency Use Authorization (EUA) only.         COVID/SARS CoV-2 [[id number]] Collected:  05/14/20 0900    Order Status:  Completed Specimen:  Respirate from Nasopharyngeal Updated:  05/14/20 0907     COVID Order Status Received          Assessment:  Active Hospital Problems    Diagnosis   • *Osteomyelitis of foot, left, acute ([facility]) [M86.172]   • Peripheral vascular disease ([facility]) [I73.9]   • Paroxysmal A-fib ([facility]) [I48.0]   • Diabetes mellitus type 2 in nonobese ([facility]) [E11.9]   • End stage renal disease on dialysis ([facility]) [N18.6, Z99.2]   • Anemia of chronic disease [D63.8]   • Essential hypertension [I10]   • Hyperthyroidism [E05.90]   • Chronic anticoagulation [Z79.01]   • Secondary hyperparathyroidism of renal origin ([facility]) [N25.81]       Plan:  Left great toe osteomyelitis  MRI+OM  LPS following   Blood cultures on 5/14-negative to date  Status post left great toe amputation down to proximal phalanx on 5/17 by Dr. Lynn. No  pathology nor cultures sent. However, source control likely achieved as osteomyelitis located distal aspect of the first distal phalanx on MRI  Continue IV vancomycin   Monitor renal function and vanco trough  Last vanco trough 9.1 on 5/17/2020  DC ceftriaxone 2 g daily  Anticipate DC abx 48 hours post op if no signs of surgical site infection    Type 2 diabetes mellitus  Contributing to above  Last hemoglobin A1c 5.8 on 4/1/2020    End-stage renal disease on hemodialysis    Anemia of chronic disease  Secondary to above    Atrial fibrillation  On apixaban    Peripheral vascular disease  Status post left lower extremity angiogram with angioplasty and thrombectomy on 4/24/2020    Discussed with internal medicine [facility] resident Dr. Pineda .

## 2020-05-18 NOTE — CARE PLAN
Problem: Communication  Goal: The ability to communicate needs accurately and effectively will improve  Outcome: PROGRESSING AS EXPECTED     Problem: Safety  Goal: Will remain free from injury  Outcome: PROGRESSING AS EXPECTED  Goal: Will remain free from falls  Outcome: PROGRESSING AS EXPECTED      Peds Attending Admit Note:  Pt seen, examined and discussed with resident team at 6pm. Agree with above H&P as documented by PGY-1 Dr Macias.  34 day old ex-38 week girl with no PMH p/w fever. Seen overnight last night with fever to 100.4F. Patient met low risk criteria so was discharged home and instructed to return to ED if unable to follow up with PMD. Patient continued to have fevers at home and was more sleepy than usual and seemed irritable so returned to ED this afternoon. Had softer stools early in day which has now resolved. Somewhat decreased UOP today and not feeding as much as usual (breastfeeds exclusively). No sick contacts. Lives with parents and 3 older siblings. Mom GBS positive, treated. No history of HSV in parents/siblings.   ED - febrile, tachycardic, appeared irritable. CBC with WBC 4.5, ANC 1100s. UA negative. unable to obtain CSF on 3 attempts. STarted ceftriaxone.   Vital Signs Last 24 Hrs  T(C): 38.8 (19 Sep 2020 20:30), Max: 38.8 (19 Sep 2020 20:30)  T(F): 101.8 (19 Sep 2020 20:30), Max: 101.8 (19 Sep 2020 20:30)  HR: 188 (19 Sep 2020 18:04) (112 - 188)  BP: 94/50 (19 Sep 2020 18:04) (82/52 - 96/67)  RR: 48 (19 Sep 2020 18:04) (30 - 52)  SpO2: 100% (19 Sep 2020 18:04) (97% - 100%)  Physical exam: Gen: Well developed, somewhat irritable during exam, closed eyes and fell asleep following exam  HEENT: NC/AT, PERRL, AFOF, no nasal flaring, no nasal congestion, moist mucous membranes, no oropharyngeal erythema  Neck: no lymphadenopathy  CVS: +S1, S2, RRR, no murmurs  Lungs: CTA b/l, no retractions/wheezes  Abdomen: soft, nontender/nondistended, +BS  Ext: no cyanosis/edema, cap refill < 2 seconds, hands/feet slightly cool  Neuro: Awake/alert, fussy  Skin: appears pale    A/P: 35 day old ex-FT girl presenting with fever since last night. Initial workup in ED last night low risk for SBI; however fevers persisted and infant became more fussy and sleepy today so returned to ED. Workup now notable for leukopenia. Unable to obtain CSF. Due to young age, concern for serious bacterial infection including bacteremia or meningitis especially given patient's irritability and persistent fevers. May also be viral infection although RVP negative. Requires admission for IV anitbiotics and close clinical monitoring.   1. fever in   -f/u pending urine and blood cultures  -continue ceftriaxone  -unable to obtain CSF. especially concerning given irritability on exam and no other clear source of fevers at this time. will discuss w/ ID. depending on blood and urine culture results and patient's clinical course over the next couple days, potential options include reattempting LP on floor, attempting LP with IR (probably not possible until Monday at the earliest), observing off antibiotics if other cultures are negative and patient afebrile, or treating empirically with ceftriaxone for 21 day course. will need to see results of blood and urine cultures and see patient's fever curve/clinical progress before determining next steps.  2. nutrition  -breast feed ad yuliet  -IVF @ M; may discontinue if good UOP/adequate PO  -I/O    70 minutes or more was spent on the total encounter with more than 50% of the visit spent on counseling and/or coordination of care.    Nichole Jacob MD

## 2020-05-18 NOTE — PROGRESS NOTES
RN received report from Yolanda MONTOYA. Patient sitting at edge of bed eating dinner, on room air, no signs of distress. Assessment completed, vitals stable. Patient alert and oriented x4. Dressing on left foot clean dry and intact. Dialysis fistula noted in left arm to have bruit and thrill present. Dialysis catheter in right chest clean dry and intact. Dressing changed. PRN tylenol given for pain 4/10 in left foot. Foot elevated for comfort. Bed locked in lowest position. Call light in reach. Hourly rounding in place. Rn to continue to monitor.

## 2020-05-18 NOTE — PROGRESS NOTES
Pharmacy Kinetics 74 y.o. male on vancomycin day # 5   2020    Currently on Vancomycin Pulse Dosing d/t ESRD on HD  Dosing history:              : Vanco load 1900 mg IV at 0930   @ 0545: VT = 9.1    : Vancomycin 1200 mg (15 mg/kg) x 1 @ 0842    Provider specified end date: TBD     Indication for Treatment: Empiric - L foot infection/osteomyelitis     Pertinent history per medical record: Admitted on 2020 for fevers suspected d/t L foot infection. Patient with history of PVD and recent admission in April for L great toe ulcer. Vascular was consulted and patient ultimately underwent LLE angioplasty and thrombectomy. Imaging during that admission negative for osteomyelitis. Patient reports the wound has grown in size since discharge. He was referred to the ED by his podiatrist as he had also developed fevers PTA. Empiric antibiotics initiated for treatment of his L foot wound, LPS and ID consulting as MRI now suggestive of osteomyelitis.      Other antibiotics:  (-)     Allergies: Mircera [methoxy polyethylene glycol-epoetin beta]      Concern for renal function includes ESRD on HD (Tue//Sat schedule).     Pertinent cultures to date:   : peripheral blood cx X2 - NGTD     MRSA nares swab if pneumonia is a concern (ordered/positive/negative/n-a): N/A     Pertinent imagin/15: MRI L foot - Abnormal signal in the distal aspect of the first distal phalanx is consistent with osteomyelitis. Overlying soft tissue wound.  : L L foot xray - No fracture or dislocation. No cortical destruction of the great toe to suggest osteomyelitis.    Recent Labs     20  0245 207 20  0257   WBC 5.2 6.4 6.7   NEUTSPOLYS 60.70  --  68.60     Recent Labs     20  0245 20  0247 20  0257   BUN 44* 25* 41*   CREATININE 5.75* 3.66* 4.76*   ALBUMIN 3.3 3.5 3.6     Recent Labs     20  0545   VANCOTROUGH 9.1*       Intake/Output Summary (Last 24 hours) at 2020  "0959  Last data filed at 2020 0740  Gross per 24 hour   Intake 250 ml   Output 40 ml   Net 210 ml      /41   Pulse 69   Temp 37.1 °C (98.7 °F) (Temporal)   Resp 17   Ht 1.676 m (5' 6\")   Wt 77.3 kg (170 lb 6.7 oz)   SpO2 98%  Temp (24hrs), Av.8 °C (98.2 °F), Min:36.4 °C (97.6 °F), Max:37.1 °C (98.8 °F)      A/P   1. Vancomycin dose change: pulse dosing not indicated   2. Next vancomycin level: ~ 48 hr random level ordered  @ 0300   3. Goal trough: 12-16 mcg/mL   4. Comments:Pt is afebrile with stable vital signs now s/p left great toe amputation on .  Plan to continue vancomycin 48 hr post op if no s/sx of surgical site infection per ID. Random vanco level is ordered for tomorrow morning. Pharmacy will continue to monitor.     Bobbi Guerra, PharmD, BCOP     "

## 2020-05-18 NOTE — PROGRESS NOTES
[facility] Nephrology Daily Progress Note    Date of Service  5/18/2020    Chief Complaint  Follow up ESRD    Interval Problem Update  73 yo M PMH ESRD TTS iHD, HTN, type 2 DM, anemia of CKD, and CKD-MBD, CAD s/p stents, IDDM, A. fib (currently on apixaban), hypothyroidism and GERD who presented to the [facility] with 2 episodes of fever (fever of 101.4F yesterday evening, associated with  worsening pain of the left great toe x 2-3 days. Has had chronic small left great toe ischemic wound going on for many weeks now. Has undelrying severe PAD and S/P Left extremity angiography and angioplasty + thrombectomy of the left posterior tibial artery and left proximal anterior tibial artery - April 2020. Was recently admitted to this hospital last month for similar problems. No aggravating or alleviating factors. He is being admitted for IV antibiotics and we have been consulted for management of dialysis while he is in hospital.     DAILY NEPHROLOGY SUMMARY  5/15/20--No events, afebrile overnight but pt reported some chills, BP stable, tolerated HD yest with 1.7L UF, MRI without gadolinium today showed osteomyelitis of left great toe, pt reports that he has been drinking more PO fluid lately  5/16/20--No events, afebrile overnight, BP stable, seen on dialysis this am, feels ok, some pain in his left toe  5/17/20--No events, feels ok, denies any pain, BP stable, to be seen by ortho for possible amputation of left first toe  5/18/20--Underwent amputation of left 1st toe and tendon release of hammertoes, reports some pain in left foot, BP stable, tolerating PO, no other complaints    Review of Systems  Review of Systems   Constitutional: Positive for malaise/fatigue. Negative for chills and fever.   HENT: Negative for congestion, nosebleeds and sore throat.    Eyes: Negative for blurred vision, double vision and photophobia.   Respiratory: Negative for cough, hemoptysis, sputum production and shortness of breath.     Cardiovascular: Negative for chest pain, palpitations and leg swelling.   Gastrointestinal: Negative for abdominal pain, diarrhea, nausea and vomiting.   Genitourinary: Negative for hematuria.   Musculoskeletal: Positive for joint pain (Left foot). Negative for myalgias and neck pain.   Skin: Negative for itching and rash.   Neurological: Negative for dizziness, focal weakness and headaches.   Endo/Heme/Allergies: Negative for environmental allergies. Does not bruise/bleed easily.   Psychiatric/Behavioral: Negative for depression. The patient is not nervous/anxious.         Physical Exam  Temp:  [36.4 °C (97.6 °F)-37.1 °C (98.8 °F)] 37.1 °C (98.7 °F)  Pulse:  [65-78] 69  Resp:  [15-22] 17  BP: ()/(41-75) 119/41  SpO2:  [92 %-100 %] 98 %    Physical Exam  Vitals signs and nursing note reviewed.   Constitutional:       General: He is not in acute distress.     Appearance: Normal appearance.   HENT:      Head: Normocephalic and atraumatic.      Right Ear: External ear normal.      Left Ear: External ear normal.      Nose: Nose normal. No congestion.      Mouth/Throat:      Mouth: Mucous membranes are moist.      Pharynx: Oropharynx is clear.   Eyes:      General: No scleral icterus.     Extraocular Movements: Extraocular movements intact.      Conjunctiva/sclera: Conjunctivae normal.   Neck:      Musculoskeletal: Normal range of motion. No neck rigidity.   Cardiovascular:      Rate and Rhythm: Normal rate and regular rhythm.      Heart sounds: No murmur.   Pulmonary:      Effort: Pulmonary effort is normal. No respiratory distress.      Breath sounds: Normal breath sounds. No wheezing.      Comments: +R Chest PC  Abdominal:      General: Bowel sounds are normal. There is no distension.      Palpations: Abdomen is soft.      Tenderness: There is no abdominal tenderness.   Musculoskeletal: Normal range of motion.         General: No swelling or deformity.      Comments: +L arm AVF with thrill/bruit  +L Foot  wrapped   Skin:     General: Skin is warm and dry.      Findings: No erythema.      Comments: +L Foot wrapped   Neurological:      General: No focal deficit present.      Mental Status: He is alert and oriented to person, place, and time. Mental status is at baseline.   Psychiatric:         Mood and Affect: Mood normal.         Behavior: Behavior normal.         Fluids    Intake/Output Summary (Last 24 hours) at 5/18/2020 1139  Last data filed at 5/18/2020 0900  Gross per 24 hour   Intake 550 ml   Output 40 ml   Net 510 ml       Laboratory  Recent Labs     05/16/20 0245 05/17/20  0247 05/18/20  0257   WBC 5.2 6.4 6.7   RBC 3.09* 3.25* 3.09*   HEMOGLOBIN 9.8* 10.4* 9.9*   HEMATOCRIT 29.5* 30.6* 29.6*   MCV 95.5 94.2 95.8   MCH 31.7 32.0 32.0   MCHC 33.2* 34.0 33.4*   RDW 50.0 49.1 50.5*   PLATELETCT 150* 163* 174   MPV 9.1 9.3 9.2     Recent Labs     05/16/20 0245 05/17/20 0247 05/18/20  0257   SODIUM 135 132* 135   POTASSIUM 4.3 3.9 4.6   CHLORIDE 96 94* 97   CO2 23 26 23   GLUCOSE 141* 110* 116*   BUN 44* 25* 41*   CREATININE 5.75* 3.66* 4.76*   CALCIUM 9.3 9.4 9.5         No results for input(s): NTPROBNP in the last 72 hours.        **I have reviewed all labs and imaging reports    IMPRESSION:  - ESRD    * Etiology likely 2/2 HTN/DM    * TTS iHD @ [facility]  - Left foot ischemic ulcer / Severe PAD     * Likely diabetic related    * S/P Left extremity angiography and angioplasty + thrombectomy of the left posterior tibial artery and left proximal anterior tibial artery - April 2020     *XR foot (5/14/2020): No fracture or dislocation.  No cortical destruction of the great toe    *MRI 5/15 without IV rafael shows osteomyelitis of left great toe    * ID Consulted    * Underwent amputation of Left 1st toe 5/17/20  - HTN    * Goal BP < 140/90    * Controlled  - Anemia of CKD    * Goal Hgb 10-11    * Stable  - CKD-MBD    * Managed at HD unit    * Phos stable  - HLD    * On statin  - CAD    * On statin  -  Hyponatremia--will treat with HD    * Pt also admits to drinking a lot of PO fluid  - Fever--resolved    * Blood cultures x2 5/14/20 negative     PLAN:  - No HD today (MON), continue qTTS dialysis schedule  - UF as tolerated  - Recommend 1.5L oral fluid restriction  - Abx for osteomyelitis per ID  - Dose adjust abx and all meds for ESRD requiring HD  - Continue home PO4 binders  - No dietary protein restrictions  - No need for ARMANDO at this time  - Follow up blood culture results  - Avoid PICC lines and Midlines in this ESRD patient to preserve vascular access  - If pt will require access for long-term IV abx recommend a tunneled central line (cannot use dialysis catheter for abx administration unless the abx ordered can be given during dialysis)

## 2020-05-18 NOTE — PROGRESS NOTES
Pt A/O x 4. Ambulates with SBA and offloading shoe. Pt rates pain 4/10 and declines intervention during assessment. Tolerating regular diet with no nausea. VSS. Dressing to left foot C/D/I. Left arm fistula with (+) bruit and thrill. Right chest permacath C/D/I. Plan of care reviewed with patient and whiteboard updated. Bed in low locked position, call light within reach, and safety precautions in place.

## 2020-05-18 NOTE — PROGRESS NOTES
Daily Progress Note:     Date of Service: 5/18/2020  Primary Team: [facility] Yellow Team   Attending: Dr. Elizabet Bustaamnte  Senior Resident: Dr. Mendez  Intern: Dr. Jacobs  Contact:  [phone]    Chief Complaint:   Osteomyelitis of the left great toe distal phalax with diabetic ulcer    Subjective  POD1 for L great toe amputation. No events overnight.  Patient states that pain has improved significantly in the left great toe.  He denies draining for bleeding from amputation site. Pain is well controlled with PO tylenol.  No fever, chills, vomiting. He has not attempted to ambulation since OR. All questions answered.      Consultants/Specialty:  Orthopedics    Review of Systems:    Review of Systems   Constitutional: Negative for chills, diaphoresis, fever, malaise/fatigue and weight loss.   HENT: Negative for congestion, sinus pain and sore throat.    Eyes: Negative for blurred vision and double vision.   Respiratory: Negative for cough, hemoptysis, sputum production and shortness of breath.    Cardiovascular: Positive for leg swelling (left leg). Negative for chest pain, palpitations and claudication.   Gastrointestinal: Positive for nausea (chronic). Negative for abdominal pain, blood in stool, constipation, diarrhea and vomiting.   Genitourinary: Negative for dysuria, flank pain and hematuria.   Musculoskeletal: Negative for falls and myalgias.   Neurological: Positive for sensory change (Loss of sensation Bilateral feet (chronic)). Negative for dizziness, tingling, speech change, focal weakness and headaches.   Endo/Heme/Allergies: Does not bruise/bleed easily.   Psychiatric/Behavioral: Negative for depression and substance abuse. The patient is not nervous/anxious.        Objective Data:   Physical Exam:   Vitals:   Temp:  [36.7 °C (98.1 °F)-37.1 °C (98.8 °F)] 36.8 °C (98.3 °F)  Pulse:  [68-78] 68  Resp:  [17-18] 17  BP: ()/(41-75) 136/45  SpO2:  [97 %-100 %] 99 %     Physical Exam  Vitals signs and  nursing note reviewed.   Constitutional:       General: He is not in acute distress.     Appearance: He is not diaphoretic.   HENT:      Head: Normocephalic and atraumatic.      Nose: No congestion.      Mouth/Throat:      Mouth: Mucous membranes are moist.      Pharynx: Oropharynx is clear. No oropharyngeal exudate.   Eyes:      Extraocular Movements: Extraocular movements intact.      Conjunctiva/sclera: Conjunctivae normal.   Neck:      Musculoskeletal: Normal range of motion and neck supple. No neck rigidity.   Cardiovascular:      Rate and Rhythm: Normal rate. Rhythm irregular.      Pulses: Normal pulses.      Heart sounds: No murmur.   Pulmonary:      Effort: Pulmonary effort is normal. No respiratory distress.      Breath sounds: Normal breath sounds. No wheezing.   Abdominal:      General: Abdomen is flat. Bowel sounds are normal.      Palpations: Abdomen is soft.      Tenderness: There is no abdominal tenderness.   Musculoskeletal:      Right lower leg: No edema.      Comments: Surgical bandage left in place. Toe wiggle intact.. LUE fistula   Skin:     General: Skin is warm and dry.   Neurological:      General: No focal deficit present.      Mental Status: He is oriented to person, place, and time.      Cranial Nerves: No cranial nerve deficit.      Sensory: Sensory deficit (Bilateral loss on bottom of feet) present.      Motor: No weakness.   Psychiatric:         Mood and Affect: Mood normal.         Behavior: Behavior normal.           Labs:   Recent Labs     05/16/20 0245 05/17/20 0247 05/18/20 0257   WBC 5.2 6.4 6.7   RBC 3.09* 3.25* 3.09*   HEMOGLOBIN 9.8* 10.4* 9.9*   HEMATOCRIT 29.5* 30.6* 29.6*   MCV 95.5 94.2 95.8   MCH 31.7 32.0 32.0   RDW 50.0 49.1 50.5*   PLATELETCT 150* 163* 174   MPV 9.1 9.3 9.2   NEUTSPOLYS 60.70  --  68.60   LYMPHOCYTES 22.80  --  17.20*   MONOCYTES 15.70*  --  9.40   EOSINOPHILS 0.20  --  3.90   BASOPHILS 0.20  --  0.30     Recent Labs     05/16/20 0245 05/17/20 0247  05/18/20  0257   SODIUM 135 132* 135   POTASSIUM 4.3 3.9 4.6   CHLORIDE 96 94* 97   CO2 23 26 23   GLUCOSE 141* 110* 116*   BUN 44* 25* 41*     Recent Labs     05/16/20  0245 05/17/20  0247 05/18/20  0257   ALBUMIN 3.3 3.5 3.6   TBILIRUBIN  --  0.4 0.4   ALKPHOSPHAT  --  108* 116*   TOTPROTEIN  --  6.5 6.3   ALTSGPT  --  10 8   ASTSGOT  --  14 16   CREATININE 5.75* 3.66* 4.76*         Imaging:   MR-FOOT-W/O LEFT   Final Result      Abnormal signal in the distal aspect of the first distal phalanx is consistent with osteomyelitis. Overlying soft tissue wound.      DX-FOOT-COMPLETE 3+ LEFT   Final Result         1. No fracture or dislocation. No cortical destruction of the great toe to suggest osteomyelitis.   2. Mild to moderate multifocal osteoarthrosis.      DX-CHEST-PORTABLE (1 VIEW)   Final Result      Chronic scarring in the left lung base. No new consolidation of pleural effusions.          Osteomyelitis of foot, left, acute (HCC)- (present on admission)  Assessment & Plan  - Source if Ulcer of left great toe  - XR foot (5/14/2020): No fracture or dislocation.  No cortical destruction of the great toe.  - MRI (5/15/20): abnormal signal in the distal aspect of the first distal phalanx consistent with osteomyelitis  - BC preliminarily negative  Plan:  - Continue vancomycin and ceftriaxone for gram negative coverage. No previous cultures with pseudomonas. ID on board and agrees.  - LPS on board  -Orthopedic surgery: partial great toe amputation and flexor tenotomies of 2-5 toes.  Patient is n.p.o.  Apixaban has been held.  -resume diabetic diet post operatively     Peripheral vascular disease (HCC)- (present on admission)  Assessment & Plan  - On admission in April 2020 patient had vascular intervention with a left extremity angiography and angioplasty and thrombectomy of the left posterior tibial artery and left proximal anterior tibial artery  -Evaluated by vascular on 5/8/2020 no additional recommendations at  that time healing well  Plan:  - Continue home apixaban and clopidogrel     Paroxysmal A-fib ([facility])- (present on admission)  Assessment & Plan  - Hold apixaban for orthopedic intervention.     End stage renal disease on dialysis ([facility])- (present on admission)  Assessment & Plan  -Patient on HD Tuesday, Thursday, Saturday  Plan:  - nephrology on board.    -1.5L fluid restriction     Diabetes mellitus type 2 in nonobese ([facility])- (present on admission)  Assessment & Plan  -Continue home regiment of glargine 5 units q. Evening  -Humalog correctional scale  -Hypoglycemia protocol     Anemia of chronic disease- (present on admission)  Assessment & Plan  -Continue to monitor.  -2/2 to CKD.     Essential hypertension- (present on admission)  Assessment & Plan  Continue home medication     Secondary hyperparathyroidism of renal origin ([facility])- (present on admission)  Assessment & Plan  - Continue home medications of calcitriol & calcium acetate     Hyperthyroidism- (present on admission)  Assessment & Plan  -Continue home methimazole dosing

## 2020-05-18 NOTE — PROGRESS NOTES
Patient requested something for pain management, tylenol only available. RN notified resident, one time dose of 2mg IV morphine ordered. RN went to administer medication and patient IV infiltrated. RN re-notified Dr. Alvarez, and oral pain medication ordered. RN to continue to monitor.

## 2020-05-18 NOTE — CARE PLAN
Problem: Communication  Goal: The ability to communicate needs accurately and effectively will improve  Outcome: PROGRESSING AS EXPECTED  Note: Patient oriented x4 and oriented to call light. Demonstrates use of call light appropriately. Understanding of diagnosis and treatment and interventions.      Problem: Safety  Goal: Will remain free from injury  Outcome: PROGRESSING SLOWER THAN EXPECTED  Note:  Call light in reach. Bed locked in lowest position. Treaded socks on patient. Bed alarm in place. Hourly rounding in place. Fall precautions noted.    Goal: Will remain free from falls  Outcome: PROGRESSING SLOWER THAN EXPECTED

## 2020-05-19 VITALS
HEIGHT: 66 IN | TEMPERATURE: 98.5 F | BODY MASS INDEX: 27.39 KG/M2 | RESPIRATION RATE: 16 BRPM | HEART RATE: 80 BPM | WEIGHT: 170.42 LBS | DIASTOLIC BLOOD PRESSURE: 46 MMHG | OXYGEN SATURATION: 98 % | SYSTOLIC BLOOD PRESSURE: 121 MMHG

## 2020-05-19 PROBLEM — M86.172 OSTEOMYELITIS OF FOOT, LEFT, ACUTE (HCC): Status: RESOLVED | Noted: 2020-04-21 | Resolved: 2020-05-19

## 2020-05-19 LAB
ALBUMIN SERPL BCP-MCNC: 3.5 G/DL (ref 3.2–4.9)
ALBUMIN/GLOB SERPL: 1.3 G/DL
ALP SERPL-CCNC: 125 U/L (ref 30–99)
ALT SERPL-CCNC: <5 U/L (ref 2–50)
ANION GAP SERPL CALC-SCNC: 14 MMOL/L (ref 7–16)
AST SERPL-CCNC: 14 U/L (ref 12–45)
BACTERIA BLD CULT: NORMAL
BACTERIA BLD CULT: NORMAL
BILIRUB SERPL-MCNC: 0.4 MG/DL (ref 0.1–1.5)
BUN SERPL-MCNC: 52 MG/DL (ref 8–22)
CALCIUM SERPL-MCNC: 9.2 MG/DL (ref 8.5–10.5)
CHLORIDE SERPL-SCNC: 97 MMOL/L (ref 96–112)
CO2 SERPL-SCNC: 23 MMOL/L (ref 20–33)
CREAT SERPL-MCNC: 5.2 MG/DL (ref 0.5–1.4)
GLOBULIN SER CALC-MCNC: 2.8 G/DL (ref 1.9–3.5)
GLUCOSE BLD-MCNC: 100 MG/DL (ref 65–99)
GLUCOSE BLD-MCNC: 86 MG/DL (ref 65–99)
GLUCOSE BLD-MCNC: 98 MG/DL (ref 65–99)
GLUCOSE SERPL-MCNC: 133 MG/DL (ref 65–99)
POTASSIUM SERPL-SCNC: 5.2 MMOL/L (ref 3.6–5.5)
PROT SERPL-MCNC: 6.3 G/DL (ref 6–8.2)
SIGNIFICANT IND 70042: NORMAL
SIGNIFICANT IND 70042: NORMAL
SITE SITE: NORMAL
SITE SITE: NORMAL
SODIUM SERPL-SCNC: 134 MMOL/L (ref 135–145)
SOURCE SOURCE: NORMAL
SOURCE SOURCE: NORMAL
VANCOMYCIN SERPL-MCNC: 17.9 UG/ML

## 2020-05-19 PROCEDURE — 80202 ASSAY OF VANCOMYCIN: CPT

## 2020-05-19 PROCEDURE — 700102 HCHG RX REV CODE 250 W/ 637 OVERRIDE(OP): Performed by: STUDENT IN AN ORGANIZED HEALTH CARE EDUCATION/TRAINING PROGRAM

## 2020-05-19 PROCEDURE — 36415 COLL VENOUS BLD VENIPUNCTURE: CPT

## 2020-05-19 PROCEDURE — A9270 NON-COVERED ITEM OR SERVICE: HCPCS | Performed by: STUDENT IN AN ORGANIZED HEALTH CARE EDUCATION/TRAINING PROGRAM

## 2020-05-19 PROCEDURE — 80053 COMPREHEN METABOLIC PANEL: CPT

## 2020-05-19 PROCEDURE — P9047 ALBUMIN (HUMAN), 25%, 50ML: HCPCS | Mod: JG

## 2020-05-19 PROCEDURE — 90935 HEMODIALYSIS ONE EVALUATION: CPT

## 2020-05-19 PROCEDURE — 99231 SBSQ HOSP IP/OBS SF/LOW 25: CPT | Performed by: NURSE PRACTITIONER

## 2020-05-19 PROCEDURE — 82962 GLUCOSE BLOOD TEST: CPT | Mod: 91

## 2020-05-19 PROCEDURE — 99239 HOSP IP/OBS DSCHRG MGMT >30: CPT | Mod: GC | Performed by: INTERNAL MEDICINE

## 2020-05-19 PROCEDURE — 700111 HCHG RX REV CODE 636 W/ 250 OVERRIDE (IP): Performed by: STUDENT IN AN ORGANIZED HEALTH CARE EDUCATION/TRAINING PROGRAM

## 2020-05-19 PROCEDURE — 700111 HCHG RX REV CODE 636 W/ 250 OVERRIDE (IP)

## 2020-05-19 PROCEDURE — 99232 SBSQ HOSP IP/OBS MODERATE 35: CPT | Performed by: INTERNAL MEDICINE

## 2020-05-19 PROCEDURE — 5A1D70Z PERFORMANCE OF URINARY FILTRATION, INTERMITTENT, LESS THAN 6 HOURS PER DAY: ICD-10-PCS | Performed by: INTERNAL MEDICINE

## 2020-05-19 RX ORDER — OXYCODONE HYDROCHLORIDE AND ACETAMINOPHEN 5; 325 MG/1; MG/1
1 TABLET ORAL EVERY 4 HOURS PRN
Qty: 10 TAB | Refills: 0 | Status: SHIPPED | OUTPATIENT
Start: 2020-05-19 | End: 2020-05-24

## 2020-05-19 RX ORDER — OXYCODONE HYDROCHLORIDE 5 MG/1
5 TABLET ORAL EVERY 6 HOURS PRN
Qty: 10 TAB | Refills: 0 | Status: SHIPPED | OUTPATIENT
Start: 2020-05-19 | End: 2020-05-24

## 2020-05-19 RX ORDER — ALBUMIN (HUMAN) 12.5 G/50ML
SOLUTION INTRAVENOUS
Status: COMPLETED
Start: 2020-05-19 | End: 2020-05-19

## 2020-05-19 RX ORDER — ONDANSETRON 2 MG/ML
4 INJECTION INTRAMUSCULAR; INTRAVENOUS ONCE
Status: COMPLETED | OUTPATIENT
Start: 2020-05-19 | End: 2020-05-19

## 2020-05-19 RX ORDER — ALBUMIN, HUMAN INJ 5% 5 %
12.5 SOLUTION INTRAVENOUS
Status: DISCONTINUED | OUTPATIENT
Start: 2020-05-19 | End: 2020-05-19

## 2020-05-19 RX ORDER — ALBUMIN (HUMAN) 12.5 G/50ML
12.5 SOLUTION INTRAVENOUS
Status: DISCONTINUED | OUTPATIENT
Start: 2020-05-19 | End: 2020-05-19 | Stop reason: HOSPADM

## 2020-05-19 RX ORDER — OXYCODONE HYDROCHLORIDE 5 MG/1
5 TABLET ORAL EVERY 4 HOURS PRN
Qty: 10 TAB | Refills: 0 | Status: SHIPPED | OUTPATIENT
Start: 2020-05-19 | End: 2020-05-24

## 2020-05-19 RX ORDER — HEPARIN SODIUM 1000 [USP'U]/ML
1750 INJECTION, SOLUTION INTRAVENOUS; SUBCUTANEOUS
Status: DISCONTINUED | OUTPATIENT
Start: 2020-05-19 | End: 2020-05-19 | Stop reason: HOSPADM

## 2020-05-19 RX ORDER — HEPARIN SODIUM 1000 [USP'U]/ML
INJECTION, SOLUTION INTRAVENOUS; SUBCUTANEOUS
Status: COMPLETED
Start: 2020-05-19 | End: 2020-05-19

## 2020-05-19 RX ADMIN — OXYCODONE 5 MG: 5 TABLET ORAL at 08:12

## 2020-05-19 RX ADMIN — HEPARIN SODIUM 3700 UNITS: 1000 INJECTION, SOLUTION INTRAVENOUS; SUBCUTANEOUS at 10:57

## 2020-05-19 RX ADMIN — ONDANSETRON 4 MG: 2 INJECTION INTRAMUSCULAR; INTRAVENOUS at 08:12

## 2020-05-19 RX ADMIN — ALBUMIN (HUMAN) 12.5 G: 5 SOLUTION INTRAVENOUS at 09:10

## 2020-05-19 RX ADMIN — CLOPIDOGREL BISULFATE 75 MG: 75 TABLET ORAL at 04:48

## 2020-05-19 RX ADMIN — HEPARIN SODIUM 1750 UNITS: 1000 INJECTION, SOLUTION INTRAVENOUS; SUBCUTANEOUS at 07:20

## 2020-05-19 RX ADMIN — CALCITRIOL CAPSULES 0.25 MCG 0.25 MCG: 0.25 CAPSULE ORAL at 04:48

## 2020-05-19 RX ADMIN — METHIMAZOLE 7.5 MG: 5 TABLET ORAL at 04:48

## 2020-05-19 RX ADMIN — APIXABAN 2.5 MG: 2.5 TABLET, FILM COATED ORAL at 04:48

## 2020-05-19 RX ADMIN — Medication 1334 MG: at 12:44

## 2020-05-19 ASSESSMENT — ENCOUNTER SYMPTOMS
PHOTOPHOBIA: 0
PALPITATIONS: 0
HEADACHES: 0
COUGH: 0
NERVOUS/ANXIOUS: 0
FOCAL WEAKNESS: 0
DIZZINESS: 0
BLURRED VISION: 0
VOMITING: 0
NAUSEA: 0
MYALGIAS: 0
HEMOPTYSIS: 0
BRUISES/BLEEDS EASILY: 0
MYALGIAS: 1
DOUBLE VISION: 0
DIARRHEA: 0
NECK PAIN: 0
ABDOMINAL PAIN: 0
CHILLS: 0
NAUSEA: 1
FEVER: 0
SHORTNESS OF BREATH: 0
DEPRESSION: 0
SPUTUM PRODUCTION: 0
SORE THROAT: 0

## 2020-05-19 NOTE — PROGRESS NOTES
[facility] Nephrology Daily Progress Note    Date of Service  5/19/2020    Chief Complaint  Follow up ESRD    Interval Problem Update  73 yo M PMH ESRD TTS iHD, HTN, type 2 DM, anemia of CKD, and CKD-MBD, CAD s/p stents, IDDM, A. fib (currently on apixaban), hypothyroidism and GERD who presented to the [facility] ER with 2 episodes of fever (fever of 101.4F yesterday evening, associated with  worsening pain of the left great toe x 2-3 days. Has had chronic small left great toe ischemic wound going on for many weeks now. Has undelrying severe PAD and S/P Left extremity angiography and angioplasty + thrombectomy of the left posterior tibial artery and left proximal anterior tibial artery - April 2020. Was recently admitted to this hospital last month for similar problems. No aggravating or alleviating factors. He is being admitted for IV antibiotics and we have been consulted for management of dialysis while he is in hospital.     DAILY NEPHROLOGY SUMMARY  5/15/20--No events, afebrile overnight but pt reported some chills, BP stable, tolerated HD yest with 1.7L UF, MRI without gadolinium today showed osteomyelitis of left great toe, pt reports that he has been drinking more PO fluid lately  5/16/20--No events, afebrile overnight, BP stable, seen on dialysis this am, feels ok, some pain in his left toe  5/17/20--No events, feels ok, denies any pain, BP stable, to be seen by ortho for possible amputation of left first toe  5/18/20--Underwent amputation of left 1st toe and tendon release of hammertoes, reports some pain in left foot, BP stable, tolerating PO, no other complaints  5/19/20--No events, seen on dialysis this am, having nausea, BP stable overnight but elevated this am, denies any foot pain    Review of Systems  Review of Systems   Constitutional: Positive for malaise/fatigue. Negative for chills and fever.   HENT: Negative for congestion, nosebleeds and sore throat.    Eyes: Negative for blurred vision, double  vision and photophobia.   Respiratory: Negative for cough, hemoptysis, sputum production and shortness of breath.    Cardiovascular: Negative for chest pain, palpitations and leg swelling.   Gastrointestinal: Positive for nausea. Negative for abdominal pain, diarrhea and vomiting.   Genitourinary: Negative for hematuria.   Musculoskeletal: Positive for joint pain (Left foot). Negative for myalgias and neck pain.   Skin: Negative for itching and rash.   Neurological: Negative for dizziness, focal weakness and headaches.   Endo/Heme/Allergies: Negative for environmental allergies. Does not bruise/bleed easily.   Psychiatric/Behavioral: Negative for depression. The patient is not nervous/anxious.         Physical Exam  Temp:  [36.7 °C (98 °F)-37.1 °C (98.8 °F)] 36.7 °C (98 °F)  Pulse:  [68-78] 72  Resp:  [17] 17  BP: (115-158)/(45-58) 158/58  SpO2:  [97 %-99 %] 99 %    Physical Exam  Vitals signs and nursing note reviewed.   Constitutional:       General: He is not in acute distress.     Appearance: Normal appearance.   HENT:      Head: Normocephalic and atraumatic.      Right Ear: External ear normal.      Left Ear: External ear normal.      Nose: Nose normal. No congestion.      Mouth/Throat:      Mouth: Mucous membranes are moist.      Pharynx: Oropharynx is clear.   Eyes:      General: No scleral icterus.     Extraocular Movements: Extraocular movements intact.      Conjunctiva/sclera: Conjunctivae normal.   Neck:      Musculoskeletal: Normal range of motion. No neck rigidity.   Cardiovascular:      Rate and Rhythm: Normal rate and regular rhythm.      Heart sounds: No murmur.   Pulmonary:      Effort: Pulmonary effort is normal. No respiratory distress.      Breath sounds: Normal breath sounds. No wheezing.      Comments: +R Chest PC  Abdominal:      General: Bowel sounds are normal. There is no distension.      Palpations: Abdomen is soft.      Tenderness: There is no abdominal tenderness.   Musculoskeletal:  Normal range of motion.         General: No swelling or deformity.      Comments: +L arm AVF with thrill/bruit  +L Foot wrapped   Skin:     General: Skin is warm and dry.      Findings: No erythema.      Comments: +L Foot wrapped   Neurological:      General: No focal deficit present.      Mental Status: He is alert and oriented to person, place, and time. Mental status is at baseline.   Psychiatric:         Mood and Affect: Mood normal.         Behavior: Behavior normal.         Fluids    Intake/Output Summary (Last 24 hours) at 5/19/2020 1019  Last data filed at 5/18/2020 1358  Gross per 24 hour   Intake 250 ml   Output --   Net 250 ml       Laboratory  Recent Labs     05/17/20  0247 05/18/20  0257   WBC 6.4 6.7   RBC 3.25* 3.09*   HEMOGLOBIN 10.4* 9.9*   HEMATOCRIT 30.6* 29.6*   MCV 94.2 95.8   MCH 32.0 32.0   MCHC 34.0 33.4*   RDW 49.1 50.5*   PLATELETCT 163* 174   MPV 9.3 9.2     Recent Labs     05/17/20  0247 05/18/20  0257 05/19/20  0253   SODIUM 132* 135 134*   POTASSIUM 3.9 4.6 5.2   CHLORIDE 94* 97 97   CO2 26 23 23   GLUCOSE 110* 116* 133*   BUN 25* 41* 52*   CREATININE 3.66* 4.76* 5.20*   CALCIUM 9.4 9.5 9.2         No results for input(s): NTPROBNP in the last 72 hours.        **I have reviewed all labs and imaging reports    IMPRESSION:  - ESRD    * Etiology likely 2/2 HTN/DM    * TTS iHD @ [facility]  - Left foot ischemic ulcer / Severe PAD     * Likely diabetic related    * S/P Left extremity angiography and angioplasty + thrombectomy of the left posterior tibial artery and left proximal anterior tibial artery - April 2020     *XR foot (5/14/2020): No fracture or dislocation.  No cortical destruction of the great toe    *MRI 5/15 without IV rafael shows osteomyelitis of left great toe    * ID Consulted    * Underwent amputation of Left 1st toe 5/17/20  - HTN    * Goal BP < 140/90    * Controlled  - Anemia of CKD    * Goal Hgb 10-11    * Stable  - CKD-MBD    * Managed at HD unit    * Phos stable  - HLD     * On statin  - CAD    * On statin  - Hyponatremia--will treat with HD    * Pt also admits to drinking a lot of PO fluid  - Fever--resolved    * Blood cultures x2 5/14/20 negative  - Nausea    PLAN:  - HD today (TUES), continue qTTS dialysis schedule  - UF as tolerated  - Abx for osteomyelitis per ID  - Dose adjust abx and all meds for ESRD requiring HD  - Continue home PO4 binders  - No dietary protein restrictions  - No need for ARMANDO at this time  - Follow up blood culture results  - Avoid PICC lines and Midlines in this ESRD patient to preserve vascular access  - If pt will require access for long-term IV abx recommend a tunneled central line (cannot use dialysis catheter for abx administration unless the abx ordered can be given during dialysis)  --Anti-emetics PRN

## 2020-05-19 NOTE — WOUND TEAM
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 LPS following and wrote orders for wound care of Left foot. Have received multiple wound consults for Left great toe, no need for wound team follow, signing off for now.

## 2020-05-19 NOTE — PROGRESS NOTES
Left message for:    Becca Bojorquez- Dialysis Coordinator  [facility] # [phone]    To inform her of patient's hospitalization and discharge today.

## 2020-05-19 NOTE — DISCHARGE INSTRUCTIONS
Discharge Instructions    Discharged to home by car with relative. Discharged via wheelchair, hospital escort: Yes.  Special equipment needed: Not Applicable    Be sure to schedule a follow-up appointment with your primary care doctor or any specialists as instructed.     Discharge Plan:        I understand that a diet low in cholesterol, fat, and sodium is recommended for good health. Unless I have been given specific instructions below for another diet, I accept this instruction as my diet prescription.   Other diet: Diabetic     Special Instructions: None    · Is patient discharged on Warfarin / Coumadin?   No     Depression / Suicide Risk    As you are discharged from this [facility] facility, it is important to learn how to keep safe from harming yourself.    Recognize the warning signs:  · Abrupt changes in personality, positive or negative- including increase in energy   · Giving away possessions  · Change in eating patterns- significant weight changes-  positive or negative  · Change in sleeping patterns- unable to sleep or sleeping all the time   · Unwillingness or inability to communicate  · Depression  · Unusual sadness, discouragement and loneliness  · Talk of wanting to die  · Neglect of personal appearance   · Rebelliousness- reckless behavior  · Withdrawal from people/activities they love  · Confusion- inability to concentrate     If you or a loved one observes any of these behaviors or has concerns about self-harm, here's what you can do:  · Talk about it- your feelings and reasons for harming yourself  · Remove any means that you might use to hurt yourself (examples: pills, rope, extension cords, firearm)  · Get professional help from the community (Mental Health, Substance Abuse, psychological counseling)  · Do not be alone:Call your Safe Contact- someone whom you trust who will be there for you.  · Call your local CRISIS HOTLINE 217-4372 or 132-374-4236  · Call your local Children's Mobile  Crisis Response Team [place] (236) 856-9051 or www.OVIA  · Call the toll free National Suicide Prevention Hotlines   · National Suicide Prevention Lifeline 932-321-XYJQ (1973)  · National Hope Line Network 800-SUICIDE (999-0029)    Bone and Joint Infections, Adult  Introduction  Bone infections (osteomyelitis) and joint infections (septic arthritis) occur when bacteria or other germs get inside a bone or a joint. This can happen if you have an infection in another part of your body that spreads through your blood. Germs from your skin or from outside of your body can also cause this type of infection if you have a wound or a broken bone (fracture) that breaks the skin.  Anyone can get a bone infection or joint infection. You may be more likely to get this type of infection if you have a condition, such as diabetes, that lowers your ability to fight infection or increases your chances of getting an infection. Bone and joint infections can cause damage, and they can spread to other areas of your body. They need to be treated quickly.  What are the causes?  Most bone and joint infections are caused by bacteria. They can also be caused by other germs, such as viruses and funguses.  What increases the risk?  This condition is more likely to develop in:  · People who recently had surgery, especially bone or joint surgery.  · People who have a long-term (chronic) disease, such as:  ¨ HIV (human immunodeficiency virus).  ¨ Diabetes.  ¨ Rheumatoid arthritis.  ¨ Sickle cell anemia.  · Elderly people.  · People who take medicines that block or weaken the body’s defense system (immune system).  · People who have a condition that reduces their blood flow.  · People who are on kidney dialysis.  · People who have an artificial joint.  · People who have had a joint or bone repaired with plates or screws (surgical hardware).  · People who use or abuse IV drugs.  · People who have had trauma, such as stepping on a  nail.  What are the signs or symptoms?  Symptoms vary depending on the type and location of your infection. Common symptoms of bone and joint infections include:  · Fever and chills.  · Redness and warmth.  · Swelling.  · Pain and stiffness.  · Drainage of fluid or pus near the infection.  · Weight loss and fatigue.  · Decreased ability to use a hand or foot.  How is this diagnosed?  This condition may be diagnosed based on symptoms, medical history, a physical exam, and diagnostic tests. Tests can help to identify the cause of the infection. You may have various tests, such as:  · A sample of tissue, fluid, or blood taken to be examined under a microscope.  · A procedure to remove fluid from the infected joint with a needle (joint aspiration) for testing in a lab.  · Pus or discharge swabbed from a wound for testing to identify germs and to determine what type of medicine will kill them (culture and sensitivity).  · Blood tests to look for evidence of infection and inflammation (biomarkers).  · Imaging studies to determine how severe the bone or joint infection is. These may include:  ¨ X-rays.  ¨ CT scan.  ¨ MRI.  ¨ Bone scan.  How is this treated?  Treatment depends on the cause and type of infection. Antibiotic medicines are usually the first treatment for a bone or joint infection. Treatment with antibiotics may include:  · Getting IV antibiotics. This may be done in a hospital at first. You may have to continue IV antibiotics at home for several weeks. You may also have to take antibiotics by mouth for several weeks after that.  · Taking more than one kind of antibiotic. Treatment may start with a type of antibiotic that works against many different bacteria (broad spectrum antibiotics). IV antibiotics may be changed if tests show that another type may work better.  Other treatments may include:  · Draining fluid from the joint by placing a needle into it (aspiration).  · Surgery to remove:  ¨ Dead or dying  tissue from a bone or joint.  ¨ An infected artificial joint.  ¨ Infected plates or screws that were used to repair a broken bone.  Follow these instructions at home:  · Take medicines only as directed by your health care provider.  · Take your antibiotic medicine as directed by your health care provider. Finish the antibiotic even if you start to feel better.  · Follow instructions from your health care provider about how to take IV antibiotics at home.  · Ask your health care provider if you have any restrictions on your activities.  · Keep all follow-up visits as directed by your health care provider. This is important.  Contact a health care provider if:  · You have a fever or chills.  · You have redness, warmth, pain, or swelling that returns after treatment.  Get help right away if:  · You have rapid breathing or you have trouble breathing.  · You have chest pain.  · You cannot drink fluids or make urine.  · The affected arm or leg swells, changes color, or turns blue.  This information is not intended to replace advice given to you by your health care provider. Make sure you discuss any questions you have with your health care provider.  Document Released: 12/18/2006 Document Revised: 05/25/2017 Document Reviewed: 12/16/2015  © 2017 Elsevier    Toe Amputation  Toe amputation is a surgical procedure to remove all or part of a toe. You may have this procedure if:  · Tissue in your toe is dying because of poor blood supply.  · You have a severe infection in your toe.  Removing your toe keeps nearby tissue healthy. If the toe is infected, removing it helps to keep the infection from spreading.  Tell a health care provider about:  · Any allergies you have.  · All medicines you are taking, including vitamins, herbs, eye drops, creams, and over-the-counter medicines.  · Any problems you or family members have had with anesthetic medicines.  · Any blood disorders you have.  · Any surgeries you have had.  · Any  medical conditions you have.  · Whether you are pregnant or may be pregnant.  What are the risks?  Generally, this is a safe procedure. However, problems may occur, including:  · Bleeding.  · Buildup of blood and fluid (hematoma).  · Infection.  · Allergic reactions to medicines.  · Tissue death in the flap of skin (flap necrosis).  · Trouble with healing.  · Minor changes in the way that you walk (your gait).  · Feeling pain in the area that was removed (phantom pain). This is rare.  What happens before the procedure?  · Follow instructions from your health care provider about eating or drinking restrictions.  · Ask your health care provider about:  ¨ Changing or stopping your regular medicines. This is especially important if you are taking diabetes medicines or blood thinners.  ¨ Taking medicines such as aspirin and ibuprofen. These medicines can thin your blood. Do not take these medicines before your procedure if your health care provider instructs you not to.  · You may be given antibiotic medicine to help prevent infection.  · Plan to have someone take you home after the procedure.  · If you will be going home right after the procedure, plan to have someone with you for 24 hours.  What happens during the procedure?  · You will be given one or more of the following:  ¨ A medicine to help you relax (sedative).  ¨ A medicine to numb the area (local anesthetic).  ¨ A medicine to make you fall asleep (general anesthetic).  ¨ A medicine that is injected into your spine to numb the area below and slightly above the injection site (spinal anesthetic).  ¨ A medicine that is injected into an area of your body to numb everything below the injection site (regional anesthetic).  · To reduce your risk of infection:  ¨ Your health care team will wash or sanitize their hands.  ¨ Your skin will be washed with soap.  · Your surgeon will raven the area of your toe for removal.  · Your surgeon will make a surgical cut (incision)  in your toe.  · The dead tissue and bone will be removed.  · Nerves and vessels will be tied or heated with a special tool to stop bleeding.  · The area will be drained and cleaned.  · If only part of a toe is removed, the remaining part will be covered with a flap of skin.  · The incision will be treated in one of these ways:  ¨ It will be closed with stitches (sutures).  ¨ It will be left open to heal if there is an infection.  · The incision area may be packed with gauze and covered with bandages (dressings).  · Tissue samples may be sent to a lab to be examined under a microscope.  The procedure may vary among health care providers and hospitals.  What happens after the procedure?  · Your blood pressure, heart rate, breathing rate, and blood oxygen level will be monitored often until the medicines you were given have worn off.  · Your foot will be raised up high (elevated) to relieve swelling.  · You will be monitored for pain.  · You will be given pain medicines and antibiotics.  · Your health care provider or physical therapist will help you to move around as soon as possible.  · Do not drive for 24 hours if you received a sedative.  This information is not intended to replace advice given to you by your health care provider. Make sure you discuss any questions you have with your health care provider.  Document Released: 11/28/2016 Document Revised: 08/21/2017 Document Reviewed: 09/10/2016  Elsevier Interactive Patient Education © 2017 [employer]

## 2020-05-19 NOTE — CARE PLAN
Problem: Infection  Goal: Will remain free from infection  Note: Patient completed IV abx course, per ID.      Problem: Knowledge Deficit  Goal: Knowledge of disease process/condition, treatment plan, diagnostic tests, and medications will improve  Note: Dressing to LLE may stay in place until outpatient f/u with ortho. Otherwise change PRN.

## 2020-05-19 NOTE — PROGRESS NOTES
3 1/2hr HD started @ 0720 and completed @ 1054,net UF 500ml only limited by bp,albumin 12.5g x1 given for support.VSS post HD,RIJ TDC dressing CDI,report given to [place] MONTOYA.

## 2020-05-19 NOTE — PROGRESS NOTES
Assumed patient care at 0700. Received report from night shift. Assessment completed. A&Ox4. Pt states 5/10 LLE pain, mediated per MAR. Experiencing N/V, mediated with one time dose of antiemetics. On room air, no s/s of distress. Fall precautions in place; alarm on, personal possessions and call light placed within reach. POC discussed with pt, communication board updated. Patient receiving HD today.

## 2020-05-19 NOTE — PROGRESS NOTES
" [facility]   POST SURGICAL PROGRESS NOTE      HPI:  74 y.o.  with a past medical history that includes type 2 diabetes, chronic kidney disease on hemodialysis, hypertension, peripheral neuropathy, and peripheral vascular disease admitted 5/14/2020 for Fever of unknown origin (FUO).   LPS has been consulted for evaluation of left great toe.     The patient reports that approximately 4/17/2020 he noticed a small black spot to the distal tip of his left great toe.  He states that he noticed that the area was soft but it did not have any drainage, odor, erythema, or swelling.  The next day on 4/18/2020, he noticed that there was swelling to his left great toe and he contacted his podiatrist to be evaluated.  He saw his podiatrist on 4/19/2020 who stated that the patient needed to come to the emergency department for evaluation.   The patient was previously admitted 4/21/2020-4/25/2020 for diabetic foot infection.  The patient was negative for osteomyelitis time received IV antibiotics and was treated with offloading and local wound care..  On 4/24/2020, patient underwent left anterior tibial artery angioplasty, Percutaneous thrombectomy of left peroneal artery, Left peroneal artery angioplasty with Dr. Encinas. The patient reports that soon after discharge, the area of his wound had increased but had not had any other complications otherwise.    Patient was scheduled to follow-up with LPS rounds on 5/8/2020 but instead followed up with Dr. Encinas that day.  He states that he followed up with vascular surgery on 5/8/2020, had ultrasound done, and was told that \"everything looked normal\".  He reports that on 5/13/2020, he developed a fever.  He states the next day he was due for dialysis but notified the dialysis center of his fever and sought care at the emergency department.  He states that he has had intermittent redness to his left great toe but was unsure if this was due to recent revascularization " "procedure. He reports having chills and swelling in his left foot and left great toe that is relieved with elevation and describes having localized dull, throbbing pain in his left great foot/left great toe.       SURGERY DATE: 5/17/2020 by Dr. Lynn  PROCEDURE: 1.  Left first toe amputation.  2.  Left percutaneous flexor tendon releases 2 through 5.         Interval history:  5/19/2020: Patient denies fevers, chills, nausea, vomiting.  Complain of neuropathic pain at times.  Pain controlled. Up to BR. Post op shoe at bedside      PERTINENT LPS RESULTS:   No path or OR cultures noted    SURGICAL SITE EXAM:      /58   Pulse 72   Temp 36.7 °C (98 °F) (Temporal)   Resp 17   Ht 1.676 m (5' 6\")   Wt 77.3 kg (170 lb 6.7 oz)   SpO2 99%   BMI 27.51 kg/m²     Pedal Pulses: +2 left DP, +2 left PT,.  Faintly palpable right PT.  Unable to palpate DP to right foot.  Sensation: Feet insensate to light touch except for tenderness to left fifth toe  Left foot warm       Left great toe amputation:  Incision well approximated, sutures intact.   Scant incisional discoloration to medial distal aspect  No erythema  Moderate edema  No new drainage      Left toes 2 through 5 tenotomies:  Single stab suture site approximated.  No erythema  Minimal edema  No new drainage  Dark discoloration to lateral dorsal fifth toe and proximal second toe dorsal aspect        Dressing care: Incisions clean with NS, removed old bloody drainage.  Dried with gauze.  Applied dry gauze over amputation sites and in between toes.  Secured with roll gauze and Ace wrapped loosely.        Photos:  Left great toe amputation, dorsal view, lateral view        Left great toe amputation, left toes 2 through 5 tenotomies, plantar view            DIABETES MANAGEMENT:    Blood glucose:   Results from last 7 days   Lab Units 05/19/20  0943 05/19/20  0818 05/18/20  2104 05/18/20  1734 05/18/20  1140 05/18/20  0831 05/17/20  2221 05/17/20  1626   ACCU CHECK " "GLUCOSE 788 mg/dL 100* 98 122* 145* 154* 62* 140* 91     A1c:   Lab Results   Component Value Date/Time    HBA1C 5.8 (H) 04/01/2020 09:34 AM            INFECTION MANAGEMENT:    Results from last 7 days   Lab Units 05/18/20  0257 05/17/20  0247 05/16/20  0245 05/15/20  0406 05/14/20  0852   WBC 1501 K/uL 6.7 6.4 5.2 5.8 9.7   PLATELET COUNT 1518 K/uL 174 163* 150* 155* 165     Wound culture results:   Results     Procedure Component Value Units Date/Time    BLOOD CULTURE [[id number]] Collected:  05/14/20 0852    Order Status:  Completed Specimen:  Blood from Peripheral Updated:  05/19/20 1100     Significant Indicator NEG     Source BLD     Site PERIPHERAL     Culture Result No growth after 5 days of incubation.    Narrative:       Per Hospital Policy: Only change Specimen Src: to \"Line\" if  specified by physician order.  Right Forearm/Arm    URINE CULTURE(NEW) [[id number]] Collected:  05/15/20 0048    Order Status:  Completed Specimen:  Urine Updated:  05/17/20 0635     Significant Indicator NEG     Source UR     Site -     Culture Result No growth at 48 hours.    Narrative:       Indication for culture:->Emergency Room Patient  Indication for culture:->Emergency Room Patient    COVID/SARS CoV-2 [[id number]]     Order Status:  Canceled Specimen:  Respirate from Nasopharyngeal     BLOOD CULTURE [[id number]] Collected:  05/14/20 0927    Order Status:  Completed Specimen:  Blood from Peripheral Updated:  05/15/20 0652     Significant Indicator NEG     Source BLD     Site PERIPHERAL     Culture Result No Growth  Note: Blood cultures are incubated for 5 days and  are monitored continuously.Positive blood cultures  are called to the RN and reported as soon as  they are identified.      Narrative:       Per Hospital Policy: Only change Specimen Src: to \"Line\" if  specified by physician order.  Right Hand    URINALYSIS [[id number]]  (Abnormal) Collected:  05/15/20 0048    Order Status:  Completed Specimen:  Urine Updated:  " 05/15/20 0103     Color Yellow     Character Clear     Specific Gravity 1.015     Ph 8.5     Glucose 250 mg/dL      Ketones Negative mg/dL      Protein 300 mg/dL      Bilirubin Negative     Urobilinogen, Urine 1.0     Nitrite Negative     Leukocyte Esterase Negative     Occult Blood Negative     Micro Urine Req Microscopic    Narrative:       Indication for culture:->Emergency Room Patient    CULTURE TISSUE W/ GRM STAIN [[id number]]     Order Status:  Canceled Specimen:  Other Tissue     SARS-CoV-2, PCR (In-House) [[id number]] Collected:  05/14/20 0900    Order Status:  Completed Updated:  05/14/20 1002     SARS-CoV-2 Source NP Swab     SARS-CoV-2 by PCR NotDetected     Comment: [facility] providers: PLEASE REFER TO DE-ESCALATION AND RETESTING PROTOCOL  on [email or web address]  **The [employer] GeneXpert Xpress SARS-CoV-2 Test has been made available for  use under the Emergency Use Authorization (EUA) only.         COVID/SARS CoV-2 [[id number]] Collected:  05/14/20 0900    Order Status:  Completed Specimen:  Respirate from Nasopharyngeal Updated:  05/14/20 0907     COVID Order Status Received               ASSESSMENT/PLAN:   POD #2 S/P left great toe amputation, tenotomies to left toes 2 through 5 by Dr. Lynn.  Incisions well approximated.  Scant incisional discoloration along left great toe amputation site.  Continue to monitor.  Palpable pedal pulses.  Incisions should heal with dressing care and offloading      Wound care:   - wound care orders updated for nursing  - Left great toe amputation site, tenotomy sites toes 2 through 5: Dry gauze, roll gauze, Ace wrap.  Change weekly and PRN drainage or dislodgment.  Supplies provided.  Patient has family at home will be able to assist with dressing change.  - Keep left foot dry.  Okay to shower with foot wrapped to protect from water.      Vascular status:   -Palpable PT, DP to left foot    Antibiotics:   -ID involved  - Completed antibiotics.  Does not need antibiotics at  discharge per ID.    Weight Bearing Status:   -Heel weight bearing to LLE    Offloading:   Previously given a diabetic boot during last admission.    - Has postop shoe at bedside.  Encouraged patient to wear postop shoe until incisions are healed  -Rx given to patient to ability for diabetic shoes and inserts once amputation sites have healed    PT/OT :   Not involved    Diabetes Education:   Not involved this admission    - Implications of loss of protective sensation (LOPS) discussed with patient- including increased risk for amputation.  Advised to check feet at least daily, moisturize feet, and to always wear protective foot wear.   -avoid trimming own nails. See podiatrist or certified foot and nail RN  -keep blood sugars <150 for improved wound healing      Plan to return to O.R.:   -no further surgeries planned at this time      DISCHARGE PLAN:    Disposition: Home.  Okay to discharge from LPS/Ortho standpoint.    Follow-up: With Dr. Lynn in 2 weeks.  Sutures to be removed approximately 3 weeks post-op      Discussed with: pt, RN, Dr. Pineda      Please note that this dictation was created using voice recognition software. I have  worked with technical experts from [employer]. to optimize the interface.  I have made every reasonable attempt to correct obvious errors, but there may be errors of grammar and possibly content that I did not discover before finalizing the note.      Carla Farah, PETRONA.P.R.N.    If any questions or concerns, please call [phone]

## 2020-05-19 NOTE — DISCHARGE PLANNING
Care Transition Team Assessment  LSW completed assessment with pt and discussed discharge plans/barriers. Pt's goal is to return home. He reported he follows with Dr. Edgar and uses mail order pharmacy through [facility]. Pt denies any current concerns/barriers to discharge at this time.     Pt is 74 year old  male who lives with his ex-wife. He reported she is a good support system and helps him with driving/transportation needs. Pt reported he is otherwise independent with his ADL/iADL needs and does not use any DME in the home. Pt denies any financial barriers to meeting his basic needs and denies any substance abuse or behavioral health needs.     Information Source  Orientation : Oriented x 4  Information Given By: Patient  Informant's Name: Adalid  Who is responsible for making decisions for patient? : Patient    Readmission Evaluation  Is this a readmission?: Yes - unplanned readmission    Elopement Risk  Legal Hold: No  Ambulatory or Self Mobile in Wheelchair: Yes  Disoriented: No  Psychiatric Symptoms: None  History of Wandering: No  Elopement this Admit: No  Vocalizing Wanting to Leave: No  Displays Behaviors, Body Language Wanting to Leave: No-Not at Risk for Elopement  Elopement Risk: Not at Risk for Elopement    Interdisciplinary Discharge Planning  Primary Care Physician: Dr. Edgar  Lives with - Patient's Self Care Capacity: Other (Comments)(Ex-wife)  Patient or legal guardian wants to designate a caregiver (see row info): No  Support Systems: Other (Comments)(Ex-wife)  Housing / Facility: [address]  Durable Medical Equipment: Not Applicable    Discharge Preparedness  What is your plan after discharge?: Home with help  What are your discharge supports?: Other (comment)(Ex-Wife)  Prior Functional Level: Ambulatory, Independent with Activities of Daily Living, Independent with Medication Management  Difficulity with ADLs: None  Difficulity with IADLs: None    Functional Assesment  Prior  Functional Level: Ambulatory, Independent with Activities of Daily Living, Independent with Medication Management    Finances  Financial Barriers to Discharge: No  Prescription Coverage: Yes    Vision / Hearing Impairment  Vision Impairment : No  Right Eye Vision: Impaired, Wears Glasses  Left Eye Vision: Impaired, Wears Glasses  Hearing Impairment : No    Advance Directive  Advance Directive?: None    Domestic Abuse  Have you ever been the victim of abuse or violence?: No  Physical Abuse or Sexual Abuse: No  Verbal Abuse or Emotional Abuse: No  Possible Abuse Reported to:: Not Applicable    Psychological Assessment  History of Substance Abuse: None  History of Psychiatric Problems: No    Discharge Risks or Barriers  Discharge risks or barriers?: No    Anticipated Discharge Information  Anticipated discharge disposition: Dialysis, Home  Discharge Address: [address] [place] [address] [place] [id number]  Discharge Contact Phone Number: [phone]

## 2020-05-19 NOTE — PROGRESS NOTES
Infectious Disease Progress Note    Author: Lillian Jordan M.D. Date & Time of service: 2020  9:04 AM    Chief Complaint:  Follow-up for left great toe osteomyelitis    Interval History:   afebrile WBC 6.4 plan for left great toe amputation today, left toe pain decreased. Tolerating IV abx without any issues   afebrile, WBC 6.7 left great toe amp yesterday, c/o post op pain 4/10 in severity. Did not sleep last night. No path or cultures sent   AF no CBC today. Pt seen at dialysis. Throbbing pain at surgical site.    Labs Reviewed, Medications Reviewed and Wound Reviewed.    Review of Systems:  Review of Systems   Constitutional: Positive for malaise/fatigue. Negative for chills and fever.   Respiratory: Negative for cough and shortness of breath.    Cardiovascular: Negative for chest pain.   Gastrointestinal: Negative for abdominal pain, diarrhea, nausea and vomiting.   Genitourinary: Negative for dysuria.   Musculoskeletal: Positive for myalgias.   Neurological: Negative for dizziness and headaches.   All other systems reviewed and are negative.      Hemodynamics:  Temp (24hrs), Av.8 °C (98.3 °F), Min:36.7 °C (98 °F), Max:37.1 °C (98.8 °F)  Temperature: (Pt went down to Dialysis)  Pulse  Av.6  Min: 65  Max: 133   Blood Pressure : 158/58       Physical Exam:  Physical Exam  Vitals signs and nursing note reviewed.   Constitutional:       Appearance: Normal appearance.   HENT:      Mouth/Throat:      Mouth: Mucous membranes are moist.      Pharynx: No oropharyngeal exudate.   Eyes:      Extraocular Movements: Extraocular movements intact.      Conjunctiva/sclera: Conjunctivae normal.      Pupils: Pupils are equal, round, and reactive to light.   Cardiovascular:      Rate and Rhythm: Normal rate. Rhythm irregular.   Pulmonary:      Effort: Pulmonary effort is normal.      Breath sounds: Normal breath sounds.      Comments: Right upper chest permacath-nontender, no surrounding  erythema  Abdominal:      Palpations: Abdomen is soft.      Tenderness: There is no abdominal tenderness.   Musculoskeletal:      Comments: Evidence of left great toe amp to proximal phalanx. Surgical site with sutures in place, well approximated. No drainage or surrounding erythema.    Skin:     General: Skin is warm and dry.   Neurological:      General: No focal deficit present.      Mental Status: He is alert and oriented to person, place, and time.      Cranial Nerves: No cranial nerve deficit.   Psychiatric:         Mood and Affect: Mood normal.         Behavior: Behavior normal.      Comments: Pleasant         Meds:    Current Facility-Administered Medications:   •  heparin  •  vancomycin  •  albumin human 5%  •  oxyCODONE immediate-release **OR** oxyCODONE immediate-release  •  apixaban  •  acetaminophen  •  atorvastatin  •  calcitRIOL  •  calcium acetate  •  carvedilol  •  clopidogrel  •  methimazole  •  methimazole  •  omeprazole  •  MD Alert...Vancomycin per Pharmacy  •  heparin  •  insulin glargine **AND** insulin lispro **AND** POC Blood Glucose **AND** NOTIFY MD and PharmD **AND** glucose **AND** dextrose 50%    Labs:  Recent Labs     05/17/20 0247 05/18/20 0257   WBC 6.4 6.7   RBC 3.25* 3.09*   HEMOGLOBIN 10.4* 9.9*   HEMATOCRIT 30.6* 29.6*   MCV 94.2 95.8   MCH 32.0 32.0   RDW 49.1 50.5*   PLATELETCT 163* 174   MPV 9.3 9.2   NEUTSPOLYS  --  68.60   LYMPHOCYTES  --  17.20*   MONOCYTES  --  9.40   EOSINOPHILS  --  3.90   BASOPHILS  --  0.30     Recent Labs     05/17/20 0247 05/18/20 0257 05/19/20 0253   SODIUM 132* 135 134*   POTASSIUM 3.9 4.6 5.2   CHLORIDE 94* 97 97   CO2 26 23 23   GLUCOSE 110* 116* 133*   BUN 25* 41* 52*     Recent Labs     05/17/20 0247 05/18/20 0257 05/19/20  0253   ALBUMIN 3.5 3.6 3.5   TBILIRUBIN 0.4 0.4 0.4   ALKPHOSPHAT 108* 116* 125*   TOTPROTEIN 6.5 6.3 6.3   ALTSGPT 10 8 <5   ASTSGOT 14 16 14   CREATININE 3.66* 4.76* 5.20*       Imaging:  Dx-chest-portable (1  View)    Result Date: 5/14/2020 5/14/2020 9:00 AM HISTORY/REASON FOR EXAM:  Sepsis. TECHNIQUE/EXAM DESCRIPTION AND NUMBER OF VIEWS: Single portable view of the chest. COMPARISON: 2/17/2020 FINDINGS: LUNGS: Chronic scarring in the left lung base. No new consolidation. No effusions. PNEUMOTHORAX: None. LINES AND TUBES: Stable right IJ central catheter position. MEDIASTINUM: No cardiomegaly. MUSCULOSKELETAL STRUCTURES: No acute displaced fracture.     Chronic scarring in the left lung base. No new consolidation of pleural effusions.    Dx-foot-2- Left    Result Date: 4/21/2020 4/21/2020 7:36 PM HISTORY/REASON FOR EXAM:  diabetic foot ulcer Left great toe ulcer TECHNIQUE/EXAM DESCRIPTION AND NUMBER OF VIEWS: 2 views of the LEFT foot. COMPARISON:  None. FINDINGS: There is soft tissue gas at the tip of the great toe and in the deep soft tissues consistent with known ulcer. There are no destructive changes to diagnose osteomyelitis. There is no fracture. Alignment is normal. Midfoot and forefoot degenerative changes are present. There is calcification within the distal achilles tendon. There is extensive atherosclerotic plaque     1.  No radiographic evidence for osteomyelitis 2.  Gas within the soft tissues of the 1st digit consistent with known open ulcer 3.  osteoarthritis of the midfoot and forefoot 4.  Extensive atherosclerotic plaque    Dx-foot-complete 3+ Left    Result Date: 5/14/2020 5/14/2020 9:00 AM HISTORY/REASON FOR EXAM:  Left foot pain TECHNIQUE/EXAM DESCRIPTION AND NUMBER OF VIEWS: 3 views of the LEFT foot. COMPARISON:  4/21/2020. FINDINGS: BONE MINERALIZATION: Decreased. JOINTS: Mild to moderate multifocal osteoarthrosis. No erosions. FRACTURE: None. DISLOCATION: None. SOFT TISSUES: Soft tissue ulceration on the great toe.. Atherosclerosis. Plantar calcaneal spur.     1. No fracture or dislocation. No cortical destruction of the great toe to suggest osteomyelitis. 2. Mild to moderate multifocal  osteoarthrosis.    Mr-foot-w/o Left    Result Date: 5/15/2020  5/15/2020 12:19 PM HISTORY/REASON FOR EXAM:  Osteomyelitis suspected, diabetic; Frequent admissions for left great toe ulcer TECHNIQUE/EXAM DESCRIPTION: MRI of the LEFT foot without contrast. The study was performed on a [employer] Signa 1.5 Gayle MRI scanner. T1 axial and sagittal, fast spin-echo T2 fat-suppressed axial and coronal, and fast inversion recovery sagittal images were obtained. COMPARISON:  X-ray 2020 FINDINGS: There is increased T2 and confluent decreased T1 signal in the distal aspect of the first distal phalanx, consistent with osteomyelitis. There is an overlying soft tissue wound. There are claw toe deformities. There is degenerative marrow edema in the midfoot, mild. There is edema in the intrinsic musculature of the foot. There is subcutaneous edema, most pronounced in the dorsum of the forefoot. The visualized flexor and extensor tendons are intact.     Abnormal signal in the distal aspect of the first distal phalanx is consistent with osteomyelitis. Overlying soft tissue wound.    Us-zia Single Level Bilat    Result Date: 2020   Vascular Laboratory  Conclusions  There is no evidence of major arterial disease demonstrated.  KLARISSA BRADSHAW  Age:    74    Gender:     M  MRN:    [id number]  :    1945      BSA:  Exam Date:     2020 17:00  Room #:     Inpatient  Priority:     Stat  Ht (in):             Wt (lb):  Ordering Physician:        MILADYS YARBROUGH  Referring Physician:       [id number] Lizarraga  Sonographer:               Arlene Vivas RVT  Study Type:                Complete Bilateral  Technical Quality:         Adequate  Indications:     Ulcer of other part of foot (toes)  CPT Codes:       85308  ICD Codes:       707.15  History:         Ulcer of left great toe for 3 days or more. Diabetes                   mellitus. No prior exam.  Limitations:     New fistula in left arm.                  RIGHT  Waveform            Systolic BPs (mmHg)                             148           Brachial  Triphasic                                Common Femoral  Monophasic                 0             Posterior Tibial  Monophasic                 171           Dorsalis Pedis                                           Peroneal                             1.16          PATSY                                           TBI                       LEFT  Waveform        Systolic BPs (mmHg)                                           Brachial  Triphasic                                Common Femoral  Hyperemic                  141           Posterior Tibial  Hyperemic                  167           Dorsalis Pedis                                           Peroneal                             1.13          PATSY                                           TBI  Findings  Right.  Evidence of calcified arteries on the left side make assessment unreliable.  Doppler waveforms of the common femoral and popliteal arteries are of high  amplitude and triphasic.  Doppler waveforms of the posterior tibial and dorsalis pedis arteries are  monophasic.  No toe brachial index can be obtained due to no flow detected by  photoplethysmographpy.  Left.  Ankle brachial index is within normal limits.  Doppler waveforms of the common femoral and popliteal arteries are of high  amplitude and triphasic.  Doppler waveforms of the posterior tibial and dorsalis pedis arteries are  hyperemic.  No toe brachial index can be obtained due to no flow detected by  photoplethysmographpy.  Additional testing was not performed.  William Scott MD  (Electronically Signed)  Final Date:      21 April 2020                   17:39    Us-extremity Artery Lower Bilat    Result Date: 4/22/2020  Lower Extremity  Arterial Duplex Report  Vascular Laboratory  CONCLUSIONS  There is calcific plaque seen throughout the right lower extremity.  Plaque is especially dense in the right  tibial and peroneal arteries.  Unable to visualize the right peroneal artery due to calcific shadowing  from the posterior and anterior tibial arteries.  No focal stenosis is seen in right lower extremity arterial system although  there is suspicion for small vessel disease.  The left posterior tibial artery appears to be occluded throughout.  There are multiple tandem stenosis seen in the left proximal anterior  tibial artery. All stenotic velocities are consistent with > 75%stenosis.  Stenosis of the left distal anterior tibial artery seen. Velocities are  consistent with > 75%stenosis.  KLARISSA BRADSHAW  Exam Date:     2020 12:57  Room #:     Inpatient  Priority:     Routine  Ht (in):             Wt (lb):  Ordering Physician:        GINGER FRAZIER  Referring Physician:       [id number] Miguel  Sonographer:               Patricia Wheatley RVT  Study Type:                Complete Bilateral  Technical Quality:         Adequate  Age:    74    Gender:     M  MRN:    [id number]  :    1945      BSA:  Indications:     Atherosclerosis of native arteries of left leg with                   ulceration of unspecified site  CPT Codes:       92971  ICD Codes:       I70.249  History:         Atherosclerosis with wound on the left great toe. PATSY done                   20.  Limitations:                RIGHT  Waveform        Peak Systolic Velocity (cm/s)                  Prox    Prox-Mid  Mid    Mid-Dist  Distal  Biphasic                          97                       CFA  Biphasic        63                                         PFA  Biphasic        79                102              70      SFA  Biphasic        65                                 59      POP  Monophasic      109               82               73      AT  Monophasic      58                                 59      PT  Not                                                        LISA   attained                LEFT  Waveform        Peak Systolic Velocity (cm/s)                  Prox    Prox-Mid  Mid    Mid-Dist  Distal  Biphasic                          116                      CFA  Biphasic        83                                         PFA  Biphasic        77                121              78      SFA  Biphasic        87                                 78      POP  Monophasic      91       326      67               306     AT  Absent          0                                  0       PT  Not                                                        LISA  attained  FINDINGS  Right.  Calcific plaque is seen throughout the right lower extremity. Plaque is  especially dense in the tibial and peroneal arteries.  Unable to visualize the right peroneal artery due to calcific shadowing  from the posterior and anterior tibial arteries.  No focal stenosis is seen.  Waveforms are monophasic at the distal anterior and posterior tibial  arteries.  Left.  Calcific plaque is seen throughout the right lower extremity. Plaque is  especially dense in the tibial and peroneal arteries.  Unable to visualize the right peroneal artery due to calcific shadowing  from the posterior and anterior tibial arteries.  The posterior tibial artery appears to be occluded throughout.  There are multiple tandem stenosis seen in the proximal anterior tibial  artery. All stenotic velocities are consistent with > 75%stenosis.  Stenosis of the distal anterior tibial artery. Velocities are consistent  with > 75%stenosis.  Waveforms at the distal anterior tibial artery are monophasic.  Danny RODRIGUEZ To  (Electronically Signed)  Final Date:      22 April 2020                   14:06    Us-extremity Venous Lower Unilat Left    Result Date: 4/22/2020   Vascular Laboratory  CONCLUSIONS  No e/o LLE DVT.  SQ edema  KLARISSA BRADSHAW  Exam Date:     04/22/2020 04:02  Room #:     Inpatient  Priority:     Routine  Ht (in):             Wt  (lb):  Ordering Physician:        FLOR SALEH  Referring Physician:       [id number]  Sonographer:               Markos Aguila RDMS  Study Type:                Complete Unilateral  Technical Quality:         Adequate  Age:    74    Gender:     M  MRN:    [id number]  :    1945      BSA:  Indications:     Localized swelling, mass and lump, left lower limb  CPT Codes:       51923  ICD Codes:       R22.42  History:         No Previous LEV, Left lower extremity swelling/edema  Limitations:  PROCEDURES:  Left lower extremity venous duplex imaging.  The following venous structures were evaluated: common femoral vein,  profunda vein, proximal portion of the greater saphenous vein, superficial  femoral vein, and the popliteal vein.  In addition, the posterior tibial and peroneal veins were evaluated.  Serial compression, augmentation maneuvers, and spectral Doppler flow  evaluation were performed.  FINDINGS:  Left lower extremity -.  The veins of the left lower extremity are readily compressible with normal  venous flow dynamics including spontaneous flow, respiratory phasic  variation and augmentation.  There is subcutaneous left lower extremity edema noted.  Contralateral flow was obtained in the right common femoral vein and  appears to be normal.  No evidence of deep vein thrombosis or superficial thrombophlebitis in the  left lower extremity.  Mario Chan MD  (Electronically Signed)  Final Date:      2020                   06:11    Ec-echocardiogram Complete W/o Cont    Result Date: 2020  Transthoracic Echo Report Echocardiography Laboratory CONCLUSIONS Prior echo done on 2019. Mildly reduced left ventricular systolic function. Left ventricular ejection fraction is visually estimated to be 45%. No evidence of valvular abnormality based on Doppler evaluation. Compared to the images of the prior study done -  there has been no significant change. KLARISSA BRADSHAW Exam Date:         2020                     02:00 Exam Location:     Inpatient Priority:          Routine Ordering Physician:        FLORI COLON Referring Physician:       [id number] Dougherty Sonographer:               MEAGAN Woodruff Age:    74     Gender:    M MRN:    [id number] :    1945 BSA:    1.82   Ht (in):    65     Wt (lb):    165 Exam Type:     Complete Indications:     Encounter for screening for cardiovascular disorders ICD Codes:       Z13.6 CPT Codes:       55530 BP:   162    /   54     HR:   85 Technical Quality:       Fair MEASUREMENTS  (Male / Female) Normal Values 2D ECHO LV Diastolic Diameter PLAX        5.3 cm                4.2 - 5.9 / 3.9 - 5.3 cm LV Systolic Diameter PLAX         3.8 cm                2.1 - 4.0 cm IVS Diastolic Thickness           1.1 cm                LVPW Diastolic Thickness          1.1 cm                LVOT Diameter                     2 cm                  Estimated LV Ejection Fraction    45 %                  LV Ejection Fraction MOD BP       41.3 %                >= 55  % LV Ejection Fraction MOD 4C       35.1 %                LV Ejection Fraction MOD 2C       53.2 %                IVC Diameter                      2.1 cm                DOPPLER AV Peak Velocity                  1.5 m/s               AV Peak Gradient                  8.9 mmHg              AV Mean Gradient                  5.7 mmHg              LVOT Peak Velocity                0.96 m/s              AV Area Cont Eq vti               1.8 cm2               Mitral E Point Velocity           0.77 m/s              Mitral E to A Ratio               0.95                  MV Pressure Half Time             51 ms                 MV Area PHT                       4.3 cm2               MV Deceleration Time              176 ms                * Indicates values subject to auto-interpretation LV EF:  45    % FINDINGS Left Ventricle Normal left ventricular chamber size. Mild concentric left ventricular hypertrophy. Mildly reduced left  ventricular systolic function. Left ventricular ejection fraction is visually estimated to be 45%. Normal regional wall motion. Normal diastolic function. Right Ventricle Normal right ventricular size. Normal right ventricular systolic function. Right Atrium Normal right atrial size. Normal inferior vena cava size and inspiratory collapse. Left Atrium Normal left atrial size. Left atrial volume index is 28 mL/sq m. Mitral Valve Structurally normal mitral valve. No mitral stenosis. No mitral regurgitation. Aortic Valve Structurally normal aortic valve. No aortic stenosis. No aortic insufficiency. Tricuspid Valve Structurally normal tricuspid valve. No tricuspid stenosis. Trace tricuspid regurgitation. Right atrial pressure is estimated to be 3 mmHg. Unable to estimate pulmonary artery pressure due to an inadequate tricuspid regurgitant jet. Pulmonic Valve Structurally normal pulmonic valve. No pulmonic stenosis. No pulmonic insufficiency. Pericardium No pericardial effusion seen. Aorta Normal aortic root for body surface area. Ascending aorta diameter is 2.9 cm. Danny RODRIGUEZ To (Electronically Signed) Final Date:     22 April 2020                 09:47    Us-thyroid    Result Date: 5/11/2020 5/11/2020 11:08 AM HISTORY/REASON FOR EXAM:  Hyperthyroid, follow-up TECHNIQUE/EXAM DESCRIPTION: Ultrasound of the soft tissues of the head and neck. COMPARISON:  Thyroid ultrasound 8/14/2018 FINDINGS: The thyroid gland is heterogeneous. Vascularity is normal. The right lobe of the thyroid gland measures 4.91 x 1.7 x 1.8 cm. The left lobe of the thyroid gland measures 3.89 x 1.59 x 1.43 cm. The isthmus measures 0.62 cm. Nodules >= 1cm: 1 Nodule #1 Location:  Right  upper Size:  1.37 x 1.07 x 0.64 cm Composition:  Solid-2 Echogenicity:  Hypoechoic-2 Shape:  Wider than tall-0 Margins:  Smooth-0 Echogenic Foci:  None-0 ACR TIRADS points/category:  4 - TR4 - Moderately Suspicious     1.  Unchanged solid right upper pole thyroid  "nodule (measurements are different on the current and prior examination but remain measurements by myself confirm unchanged in size) 2.  This nodule was previously biopsied and showed \"atypia of unknown significance\" ACR TI-RADS Recommendations TR4 - FNA recommended although whether FNA should be repeated depends upon oncologic surgeon's input Recommendations based on the American College [employer] Thyroid imaging, reporting and Data System (TI-RADS) 2017. INTERPRETING LOCATION: [address] [place] [address], [address]      Micro:  Results     Procedure Component Value Units Date/Time    URINE CULTURE(NEW) [[id number]] Collected:  05/15/20 0048    Order Status:  Completed Specimen:  Urine Updated:  05/17/20 0635     Significant Indicator NEG     Source UR     Site -     Culture Result No growth at 48 hours.    Narrative:       Indication for culture:->Emergency Room Patient  Indication for culture:->Emergency Room Patient    COVID/SARS CoV-2 [[id number]]     Order Status:  Canceled Specimen:  Respirate from Nasopharyngeal     BLOOD CULTURE [[id number]] Collected:  05/14/20 0852    Order Status:  Completed Specimen:  Blood from Peripheral Updated:  05/15/20 0652     Significant Indicator NEG     Source BLD     Site PERIPHERAL     Culture Result No Growth  Note: Blood cultures are incubated for 5 days and  are monitored continuously.Positive blood cultures  are called to the RN and reported as soon as  they are identified.      Narrative:       Per Hospital Policy: Only change Specimen Src: to \"Line\" if  specified by physician order.  Right Forearm/Arm    BLOOD CULTURE [[id number]] Collected:  05/14/20 0927    Order Status:  Completed Specimen:  Blood from Peripheral Updated:  05/15/20 0652     Significant Indicator NEG     Source BLD     Site PERIPHERAL     Culture Result No Growth  Note: Blood cultures are incubated for 5 days and  are monitored continuously.Positive blood cultures  are called to the RN and reported " "as soon as  they are identified.      Narrative:       Per Hospital Policy: Only change Specimen Src: to \"Line\" if  specified by physician order.  Right Hand    URINALYSIS [[id number]]  (Abnormal) Collected:  05/15/20 0048    Order Status:  Completed Specimen:  Urine Updated:  05/15/20 0103     Color Yellow     Character Clear     Specific Gravity 1.015     Ph 8.5     Glucose 250 mg/dL      Ketones Negative mg/dL      Protein 300 mg/dL      Bilirubin Negative     Urobilinogen, Urine 1.0     Nitrite Negative     Leukocyte Esterase Negative     Occult Blood Negative     Micro Urine Req Microscopic    Narrative:       Indication for culture:->Emergency Room Patient    CULTURE TISSUE W/ GRM STAIN [[id number]]     Order Status:  Canceled Specimen:  Other Tissue     SARS-CoV-2, PCR (In-House) [[id number]] Collected:  05/14/20 0900    Order Status:  Completed Updated:  05/14/20 1002     SARS-CoV-2 Source NP Swab     SARS-CoV-2 by PCR NotDetected     Comment: [facility] providers: PLEASE REFER TO DE-ESCALATION AND RETESTING PROTOCOL  on [email or web address]  **The [facility] GeneXpert Xpress SARS-CoV-2 Test has been made available for  use under the Emergency Use Authorization (EUA) only.         COVID/SARS CoV-2 [[id number]] Collected:  05/14/20 0900    Order Status:  Completed Specimen:  Respirate from Nasopharyngeal Updated:  05/14/20 0907     COVID Order Status Received          Assessment:  Active Hospital Problems    Diagnosis   • *Osteomyelitis of foot, left, acute (HCC) [M86.172]   • Peripheral vascular disease (HCC) [I73.9]   • Paroxysmal A-fib (HCC) [I48.0]   • Diabetes mellitus type 2 in nonobese (HCC) [E11.9]   • End stage renal disease on dialysis (HCC) [N18.6, Z99.2]   • Anemia of chronic disease [D63.8]   • Essential hypertension [I10]   • Hyperthyroidism [E05.90]   • Chronic anticoagulation [Z79.01]   • Secondary hyperparathyroidism of renal origin ([facility]) [N25.81]       Plan:  Left great toe osteomyelitis  MRI+OM  LPS " following   Blood cultures on 5/14-negative to date  Status post left great toe amputation down to proximal phalanx on 5/17 by Dr. Lynn. No pathology nor cultures sent. However, source control likely achieved as osteomyelitis located distal aspect of the first distal phalanx on MRI  DC'd ceftriaxone 2 g daily on 5/18  No signs of surgical site infection on exam today  DC vancomycin    Type 2 diabetes mellitus  Contributing to above  Last hemoglobin A1c 5.8 on 4/1/2020    End-stage renal disease on hemodialysis    Anemia of chronic disease  Secondary to above    Atrial fibrillation  On apixaban    Peripheral vascular disease  Status post left lower extremity angiogram with angioplasty and thrombectomy on 4/24/2020    I have performed a physical exam and reviewed and updated ROS and plan today 5/19/2020.  In review of yesterday's note 5/18/2020, there are no changes except as documented above.    OK to DC pt home from ID standpoint    No ID clinic FU needed    Plan of care d/w [facility] attending Dr. Bustamante. Will sign off.

## 2020-05-19 NOTE — PROGRESS NOTES
Patient discharged home via ride with relative. [facility]'ed. DANIELLE Casillas. Patient to resume HD with [facility], already established. Discharge instructions given specifically involving: diagnosis, follow ups, and dressing care (provided at bedside by LPS) and supplies provided. Patient verbalized understanding, 2nd copy of AVS signed and placed in patient chart.Patient verified all personal belongings are accounted for prior to leaving unit.

## 2020-05-19 NOTE — PROGRESS NOTES
Pharmacy Kinetics 74 y.o. male on vancomycin day # 5   2020    Currently on Vancomycin Pulse Dosing d/t ESRD on HD  Dosing history:              : Vanco load 1900 mg IV at 0930   @ 0545: VR = 9.1               : Vancomycin 1200 mg (15 mg/kg) x 1 @ 0842   @ 0253: VR = 17.9     Provider specified end date: TBD     Indication for Treatment: Empiric - L foot infection/osteomyelitis     Pertinent history per medical record: Admitted on 2020 for fevers suspected d/t L foot infection. Patient with history of PVD and recent admission in April for L great toe ulcer. Vascular was consulted and patient ultimately underwent LLE angioplasty and thrombectomy. Imaging during that admission negative for osteomyelitis. Patient reports the wound has grown in size since discharge. He was referred to the ED by his podiatrist as he had also developed fevers PTA. Empiric antibiotics initiated for treatment of his L foot wound, LPS and ID consulting as MRI now suggestive of osteomyelitis. Pt s/p amputation of left 1st toe on 20.      Other antibiotics:  (-)     Allergies: Mircera [methoxy polyethylene glycol-epoetin beta]      Concern for renal function includes ESRD on HD (Tue//Sat schedule).     Pertinent cultures to date:   : peripheral blood cx X2 - NGTD     MRSA nares swab if pneumonia is a concern (ordered/positive/negative/n-a): N/A     Pertinent imagin/15: MRI L foot - Abnormal signal in the distal aspect of the first distal phalanx is consistent with osteomyelitis. Overlying soft tissue wound.  : L L foot xray - No fracture or dislocation. No cortical destruction of the great toe to suggest osteomyelitis.    Recent Labs     20  0247 20   WBC 6.4 6.7   NEUTSPOLYS  --  68.60     Recent Labs     20  0247 20  0257 20   BUN 25* 41* 52*   CREATININE 3.66* 4.76* 5.20*   ALBUMIN 3.5 3.6 3.5     Recent Labs     20  0545 20  "  VANCOTROUGH 9.1*  --    VANCORANDOM  --  17.9       Intake/Output Summary (Last 24 hours) at 2020 0812  Last data filed at 2020 1358  Gross per 24 hour   Intake 550 ml   Output --   Net 550 ml      /58   Pulse 72   Temp 36.7 °C (98 °F) (Temporal)   Resp 17   Ht 1.676 m (5' 6\")   Wt 77.3 kg (170 lb 6.7 oz)   SpO2 99%  Temp (24hrs), Av.8 °C (98.3 °F), Min:36.7 °C (98 °F), Max:37.1 °C (98.8 °F)      A/P   1. Vancomycin dose change: Vancomycin 1200 mg  IV x 1 after iHD completion today  2. Next vancomycin level: 20 with AM labs (ordered)  3. Goal trough: 12-16 mcg/ml  4. Comments: Vancomycin random level prior to iHD today was just over goal range and anticipated to be in goal range post iHD. Will re-dose vancomycin at 15 mg/kg x 1 today and plan for repeat level in ~ 48 hrs if dosing continues. Plan to continue vancomycin 48 hr post op if no s/sx of surgical site infection per ID.    Bobbi Guerra, PharmD, BCOP       "

## 2020-06-02 ENCOUNTER — OFFICE VISIT (OUTPATIENT)
Dept: CARDIOLOGY | Facility: MEDICAL CENTER | Age: 75
End: 2020-06-02
Payer: MEDICARE

## 2020-06-02 VITALS
RESPIRATION RATE: 18 BRPM | SYSTOLIC BLOOD PRESSURE: 102 MMHG | DIASTOLIC BLOOD PRESSURE: 74 MMHG | HEART RATE: 85 BPM | WEIGHT: 162.4 LBS | HEIGHT: 66 IN | OXYGEN SATURATION: 96 % | BODY MASS INDEX: 26.1 KG/M2

## 2020-06-02 DIAGNOSIS — E78.5 DYSLIPIDEMIA: ICD-10-CM

## 2020-06-02 DIAGNOSIS — Z95.5 PRESENCE OF STENT IN LAD CORONARY ARTERY: ICD-10-CM

## 2020-06-02 DIAGNOSIS — I25.10 CORONARY ARTERY DISEASE, NON-OCCLUSIVE: ICD-10-CM

## 2020-06-02 DIAGNOSIS — I48.0 PAROXYSMAL A-FIB (HCC): ICD-10-CM

## 2020-06-02 DIAGNOSIS — I44.4 LEFT ANTERIOR FASCICULAR BLOCK: ICD-10-CM

## 2020-06-02 DIAGNOSIS — I45.10 RIGHT BUNDLE BRANCH BLOCK: ICD-10-CM

## 2020-06-02 DIAGNOSIS — Z79.01 ANTICOAGULATED: ICD-10-CM

## 2020-06-02 DIAGNOSIS — I10 ESSENTIAL HYPERTENSION: ICD-10-CM

## 2020-06-02 DIAGNOSIS — I25.5 CARDIOMYOPATHY, ISCHEMIC: ICD-10-CM

## 2020-06-02 PROCEDURE — 99214 OFFICE O/P EST MOD 30 MIN: CPT | Performed by: INTERNAL MEDICINE

## 2020-06-02 RX ORDER — ONDANSETRON 4 MG/1
TABLET, ORALLY DISINTEGRATING ORAL
Status: ON HOLD | COMMUNITY
Start: 2020-05-31 | End: 2021-01-01

## 2020-06-02 ASSESSMENT — ENCOUNTER SYMPTOMS
COUGH: 0
SHORTNESS OF BREATH: 0
LOSS OF CONSCIOUSNESS: 0
BRUISES/BLEEDS EASILY: 0
DIZZINESS: 0
MYALGIAS: 0
PALPITATIONS: 0

## 2020-06-02 ASSESSMENT — FIBROSIS 4 INDEX: FIB4 SCORE: 2.88

## 2020-06-02 NOTE — PROGRESS NOTES
"Chief Complaint   Patient presents with   • Coronary Artery Disease       Subjective:   Adalid Sepulveda is a 74 y.o. male who presents today for follow-up evaluation of PAF, anticoagulation CAD, LAD stent, hypertension, dyslipidemia with history of diabetes mellitus, CKD on dialysis and associated intermittent hypotension.    Last seen in the ER 11/29 with dialysis induced hypotension and angina pectoris superimposed on significant anemia.  The patient states that his dialysis induced hypotension is \"smoothing out\".  His left AV fistula is matured and will be using that now for hemodialysis with subsequent removal of permacath.  No angina pectoris.  Compliant with medications.  No bleeding.  Overall is grateful for his current situation and medical care.  May need to have thyroid nodule removed.    Past Medical History:   Diagnosis Date   • CAD (coronary artery disease)     stents   • Chronic anticoagulation 3/26/2020   • Chronic kidney disease (CKD), stage V (HCC)    • Diabetes    • Hypertension    • Osteoarthritis    • Peripheral neuropathy      Past Surgical History:   Procedure Laterality Date   • TOE AMPUTATION Left 5/17/2020    Procedure: AMPUTATION, TOE GREAT;  Surgeon: Leobardo Lynn M.D.;  Location: SURGERY [facility];  Service: Orthopedics   • TENOTOMY Left 5/17/2020    Procedure: FLEXOR TENOTOMIES, TWO-FIVE TOES;  Surgeon: Leobardo Lynn M.D.;  Location: SURGERY [facility];  Service: Orthopedics   • APPENDECTOMY     • OTHER CARDIAC SURGERY      stents x1   • OTHER ORTHOPEDIC SURGERY      knee surgery     Family History   Problem Relation Age of Onset   • Heart Disease Mother    • Alcohol/Drug Father    • Thyroid Son    • Diabetes Brother    • Hypertension Neg Hx    • Hyperlipidemia Neg Hx      Social History     Socioeconomic History   • Marital status:      Spouse name: Not on file   • Number of children: Not on file   • Years of education: Not on file   • Highest education " level: Not on file   Occupational History   • Not on file   Social Needs   • Financial resource strain: Not hard at all   • Food insecurity     Worry: Never true     Inability: Never true   • Transportation needs     Medical: No     Non-medical: No   Tobacco Use   • Smoking status: Former Smoker     Packs/day: 1.00     Years: 55.00     Pack years: 55.00     Types: Cigarettes     Last attempt to quit: 2014     Years since quittin.3   • Smokeless tobacco: Never Used   Substance and Sexual Activity   • Alcohol use: No   • Drug use: No   • Sexual activity: Not on file   Lifestyle   • Physical activity     Days per week: Not on file     Minutes per session: Not on file   • Stress: Not on file   Relationships   • Social connections     Talks on phone: Not on file     Gets together: Not on file     Attends Church service: Not on file     Active member of club or organization: Not on file     Attends meetings of clubs or organizations: Not on file     Relationship status: Not on file   • Intimate partner violence     Fear of current or ex partner: Not on file     Emotionally abused: Not on file     Physically abused: Not on file     Forced sexual activity: Not on file   Other Topics Concern   • Not on file   Social History Narrative   • Not on file     Allergies   Allergen Reactions   • Mircera [Methoxy Polyethylene Glycol-Epoetin Beta] Hives     Outpatient Encounter Medications as of 2020   Medication Sig Dispense Refill   • ondansetron (ZOFRAN ODT) 4 MG TABLET DISPERSIBLE      • apixaban (ELIQUIS) 2.5mg Tab Take 1 Tab by mouth 2 Times a Day. 180 Tab 3   • methimazole (TAPAZOLE) 5 MG Tab Take 1.5 tabs in am, and 1 tab in evening 270 Tab 0   • clopidogrel (PLAVIX) 75 MG Tab Take 1 Tab by mouth every day. 90 Tab 1   • calcitRIOL (ROCALTROL) 0.25 MCG Cap Take 0.25 mcg by mouth every day.     • carvedilol (COREG) 3.125 MG Tab Take 3.125 mg by mouth 2 times a day, with meals.     • insulin glargine (LANTUS  "SOLOSTAR) 100 UNIT/ML Solution Pen-injector injection Inject 5 Units as instructed every bedtime.     • pantoprazole (PROTONIX) 40 MG Tablet Delayed Response Take 40 mg by mouth every bedtime.     • calcium acetate (PHOS-LO) 667 MG Cap Take 1,334 mg by mouth 3 times a day, with meals.     • atorvastatin (LIPITOR) 40 MG Tab Take 1 Tab by mouth every bedtime. 30 Tab 0   • [DISCONTINUED] apixaban (ELIQUIS) 2.5mg Tab Take 2.5 mg by mouth 2 Times a Day.       No facility-administered encounter medications on file as of 6/2/2020.      Review of Systems   Respiratory: Negative for cough and shortness of breath.    Cardiovascular: Negative for chest pain and palpitations.   Musculoskeletal: Negative for myalgias.   Neurological: Negative for dizziness and loss of consciousness.   Endo/Heme/Allergies: Does not bruise/bleed easily.        Objective:   /74 (BP Location: Right arm, Patient Position: Sitting, BP Cuff Size: Adult)   Pulse 85   Resp 18   Ht 1.676 m (5' 6\")   Wt 73.7 kg (162 lb 6.4 oz)   SpO2 96%   BMI 26.21 kg/m²     Physical Exam   Constitutional: He is oriented to person, place, and time. He appears well-developed and well-nourished. No distress.   HENT:   Head: Normocephalic and atraumatic.   Eyes: Pupils are equal, round, and reactive to light. Conjunctivae and EOM are normal. No scleral icterus.   Neck: Normal range of motion. Neck supple. No JVD present. No thyromegaly present.   Permacath.   Cardiovascular: Normal rate, regular rhythm, normal heart sounds and intact distal pulses. Exam reveals no gallop and no friction rub.   No murmur heard.  Pulmonary/Chest: Effort normal and breath sounds normal. No accessory muscle usage. No respiratory distress. He has no wheezes. He has no rales.   Abdominal: Soft. Bowel sounds are normal. He exhibits no abdominal bruit, no pulsatile midline mass and no mass. There is no hepatosplenomegaly. There is no abdominal tenderness.   Musculoskeletal:         " "General: No edema.      Comments: Left upper arm AV fistula.  Support boot left foot.   Neurological: He is alert and oriented to person, place, and time. Coordination normal.   Skin: Skin is warm, dry and intact. No rash noted. No cyanosis. Nails show no clubbing.   Psychiatric: He has a normal mood and affect. His behavior is normal.   Vitals reviewed.    CARDIAC CATHETERIZATION 11/16/2019  PREPROCEDURE DIAGNOSES:  1.  Non-ST elevation myocardial infarction.  2.  Previous coronary stent 7 years ago, [facility].  3.  End-stage renal disease, on hemodialysis.  4.  Diabetes mellitus.  5.  Paroxysmal atrial fibrillation, new diagnosis.     POSTPROCEDURE DIAGNOSES:  1.  Patent coronary stents proximal mid, left anterior descending   artery.  2.  Coronary artery disease involving diagonal \"jailed\" branch   ostial 90% stenosis, posterior descending artery ostial 50% stenosis.    ECHOCARDIOGRAM 04/22/2020  Prior echo done on 11/22/2019.  Mildly reduced left ventricular systolic function. Left ventricular   ejection fraction is visually estimated to be 45%.  No evidence of valvular abnormality based on Doppler evaluation.   Compared to the images of the prior study done -  there has been no   significant change.      Assessment:     1. Coronary artery disease, non-occlusive     2. Presence of stent in LAD coronary artery     3. Cardiomyopathy, ischemic     4. Paroxysmal A-fib (HCC)     5. Anticoagulated     6. Left anterior fascicular block     7. Right bundle branch block     8. Essential hypertension     9. Dyslipidemia         Medical Decision Making:  Today's Assessment / Status / Plan:     Assessment  1.    Dialysis induced hypotension further aggravated by severe anemia are resolved..  2.  CAD.  3.  Coronary stents 2012 proximal left anterior descending artery and resultant jailed/trapped diagonal branch.  4.  Ischemic cardiomyopathy with mild left ventricular dysfunction EF 45-50%.  5.  PAF.  " Diagnosed 11/2019  6.  Anticoagulation on Eliquis.  7.  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  8.  Pneumonia  9.  End-stage renal disease hemodialysis.  10.  Diabetes mellitus.  11.  Hypertension.  12.  Dyslipidemia.  13.  Chronic ischemic ulcer left big toe.  14.  Thyroid nodule.    Recommendation Discussion  1.  The patient is stable from a cardiac standpoint concerning his coronary artery disease with no angina pectoris, ischemic cardiomyopathy with mild LV dysfunction with no heart failure, PAF and anticoagulation with antiplatelet therapy, dialysis induced hypotension which is improving and dyslipidemia.  2.  I reviewed recent lipid panel and LDL is at goal at 34.  3.  Renewed his Eliquis.  4.  Continue current cardiac therapy.  5.  Follow-up 6 months.

## 2020-06-08 DIAGNOSIS — E05.90 HYPERTHYROIDISM: ICD-10-CM

## 2020-06-08 RX ORDER — METHIMAZOLE 5 MG/1
TABLET ORAL
Qty: 225 TAB | Refills: 0 | Status: SHIPPED | OUTPATIENT
Start: 2020-06-08 | End: 2020-01-01 | Stop reason: SDUPTHER

## 2020-06-18 ENCOUNTER — HOSPITAL ENCOUNTER (EMERGENCY)
Facility: MEDICAL CENTER | Age: 75
End: 2020-06-18
Attending: EMERGENCY MEDICINE
Payer: MEDICARE

## 2020-06-18 VITALS
WEIGHT: 168.87 LBS | HEART RATE: 80 BPM | BODY MASS INDEX: 27.26 KG/M2 | TEMPERATURE: 97.9 F | RESPIRATION RATE: 18 BRPM | SYSTOLIC BLOOD PRESSURE: 130 MMHG | OXYGEN SATURATION: 95 % | DIASTOLIC BLOOD PRESSURE: 56 MMHG

## 2020-06-18 DIAGNOSIS — R55 NEAR SYNCOPE: ICD-10-CM

## 2020-06-18 DIAGNOSIS — R42 DIZZINESS: ICD-10-CM

## 2020-06-18 LAB
ALBUMIN SERPL BCP-MCNC: 4.2 G/DL (ref 3.2–4.9)
ALBUMIN/GLOB SERPL: 1.4 G/DL
ALP SERPL-CCNC: 134 U/L (ref 30–99)
ALT SERPL-CCNC: 13 U/L (ref 2–50)
ANION GAP SERPL CALC-SCNC: 12 MMOL/L (ref 7–16)
AST SERPL-CCNC: 17 U/L (ref 12–45)
BASOPHILS # BLD AUTO: 0.2 % (ref 0–1.8)
BASOPHILS # BLD: 0.02 K/UL (ref 0–0.12)
BILIRUB SERPL-MCNC: 0.6 MG/DL (ref 0.1–1.5)
BUN SERPL-MCNC: 14 MG/DL (ref 8–22)
CALCIUM SERPL-MCNC: 9.7 MG/DL (ref 8.5–10.5)
CHLORIDE SERPL-SCNC: 92 MMOL/L (ref 96–112)
CO2 SERPL-SCNC: 29 MMOL/L (ref 20–33)
CREAT SERPL-MCNC: 2.33 MG/DL (ref 0.5–1.4)
EKG IMPRESSION: NORMAL
EOSINOPHIL # BLD AUTO: 0 K/UL (ref 0–0.51)
EOSINOPHIL NFR BLD: 0 % (ref 0–6.9)
ERYTHROCYTE [DISTWIDTH] IN BLOOD BY AUTOMATED COUNT: 51.8 FL (ref 35.9–50)
GLOBULIN SER CALC-MCNC: 3.1 G/DL (ref 1.9–3.5)
GLUCOSE SERPL-MCNC: 167 MG/DL (ref 65–99)
HCT VFR BLD AUTO: 29.4 % (ref 42–52)
HGB BLD-MCNC: 10.2 G/DL (ref 14–18)
IMM GRANULOCYTES # BLD AUTO: 0.08 K/UL (ref 0–0.11)
IMM GRANULOCYTES NFR BLD AUTO: 0.8 % (ref 0–0.9)
LYMPHOCYTES # BLD AUTO: 1.19 K/UL (ref 1–4.8)
LYMPHOCYTES NFR BLD: 12.2 % (ref 22–41)
MCH RBC QN AUTO: 33.1 PG (ref 27–33)
MCHC RBC AUTO-ENTMCNC: 34.7 G/DL (ref 33.7–35.3)
MCV RBC AUTO: 95.5 FL (ref 81.4–97.8)
MONOCYTES # BLD AUTO: 0.92 K/UL (ref 0–0.85)
MONOCYTES NFR BLD AUTO: 9.4 % (ref 0–13.4)
NEUTROPHILS # BLD AUTO: 7.55 K/UL (ref 1.82–7.42)
NEUTROPHILS NFR BLD: 77.4 % (ref 44–72)
NRBC # BLD AUTO: 0 K/UL
NRBC BLD-RTO: 0 /100 WBC
PLATELET # BLD AUTO: 95 K/UL (ref 164–446)
PMV BLD AUTO: 8.8 FL (ref 9–12.9)
POTASSIUM SERPL-SCNC: 4.2 MMOL/L (ref 3.6–5.5)
PROT SERPL-MCNC: 7.3 G/DL (ref 6–8.2)
RBC # BLD AUTO: 3.08 M/UL (ref 4.7–6.1)
SODIUM SERPL-SCNC: 133 MMOL/L (ref 135–145)
T4 FREE SERPL-MCNC: 0.9 NG/DL (ref 0.93–1.7)
TROPONIN T SERPL-MCNC: 103 NG/L (ref 6–19)
TSH SERPL DL<=0.005 MIU/L-ACNC: 10 UIU/ML (ref 0.38–5.33)
WBC # BLD AUTO: 9.8 K/UL (ref 4.8–10.8)

## 2020-06-18 PROCEDURE — 84484 ASSAY OF TROPONIN QUANT: CPT

## 2020-06-18 PROCEDURE — 85025 COMPLETE CBC W/AUTO DIFF WBC: CPT

## 2020-06-18 PROCEDURE — 84443 ASSAY THYROID STIM HORMONE: CPT

## 2020-06-18 PROCEDURE — 80053 COMPREHEN METABOLIC PANEL: CPT

## 2020-06-18 PROCEDURE — 93005 ELECTROCARDIOGRAM TRACING: CPT | Performed by: EMERGENCY MEDICINE

## 2020-06-18 PROCEDURE — 84439 ASSAY OF FREE THYROXINE: CPT

## 2020-06-18 PROCEDURE — 99283 EMERGENCY DEPT VISIT LOW MDM: CPT

## 2020-06-18 ASSESSMENT — FIBROSIS 4 INDEX: FIB4 SCORE: 2.88

## 2020-06-18 NOTE — ED TRIAGE NOTES
Pt comes in complaining of dizziness and vomiting today while at dialysis. Pt stating near syncopal event. Pt reporting trouble focusing and irregular heartbeat.

## 2020-06-18 NOTE — ED PROVIDER NOTES
"ED Provider Note    CHIEF COMPLAINT  Chief Complaint   Patient presents with   • Near Syncopal   • Dizziness       HPI  Luis Sepulveda is a 74 y.o. male who presents with dizziness and near syncope.  Patient was at dialysis and all of a sudden became very dizzy like the room is spinning.  He got nauseated and vomited.  He felt like he was going to pass out but did not.  He felt little bit better afterwards and then had another episode and got cold and clammy.  He denies any chest pain or shortness of breath with any of this.  He says he has a \"sensitive stomach\" but never has felt like he is going to pass out.  He did a something similar when he first started dialysis in November but has not had since.      REVIEW OF SYSTEMS  Positive for dizziness nausea vomiting, negative for fever shortness of breath.  All of systems negative except as reviewed    PAST MEDICAL HISTORY   has a past medical history of CAD (coronary artery disease), Chronic anticoagulation (3/26/2020), Chronic kidney disease (CKD), stage V (HCC), Congestive heart failure (HCC), Diabetes, Hypertension, Kidney failure, Osteoarthritis, and Peripheral neuropathy.    SOCIAL HISTORY  Social History     Tobacco Use   • Smoking status: Former Smoker     Packs/day: 1.00     Years: 55.00     Pack years: 55.00     Types: Cigarettes     Last attempt to quit: 2014     Years since quittin.4   • Smokeless tobacco: Never Used   Substance and Sexual Activity   • Alcohol use: No   • Drug use: No   • Sexual activity: Not on file       SURGICAL HISTORY   has a past surgical history that includes appendectomy; other orthopedic surgery; other cardiac surgery; toe amputation (Left, 2020); and tenotomy (Left, 2020).    CURRENT MEDICATIONS  Home Medications    **Home medications have not yet been reviewed for this encounter**         ALLERGIES  Allergies   Allergen Reactions   • Mircera [Methoxy Polyethylene Glycol-Epoetin Beta] Hives "       PHYSICAL EXAM  VITAL SIGNS: /56   Pulse 80   Temp 36.6 °C (97.9 °F) (Temporal)   Resp 18   Wt 76.6 kg (168 lb 14 oz)   SpO2 95%   BMI 27.26 kg/m²   Constitutional: Alert in no apparent distress.  HENT: Normocephalic, Atraumatic, Bilateral external ears normal. Nose normal.   Eyes: Pupils are equal and reactive. Conjunctiva normal, non-icteric.   Heart: Regular rate and rythm, no murmurs.    Lungs: Clear to auscultation bilaterally.  Abdomen: Soft nontender nondistended  Skin: Warm, Dry, No erythema, No rash.   Neurologic: Alert, Grossly non-focal.   Psychiatric: Affect normal, Judgment normal, Mood normal, Appears appropriate and not intoxicated.   Extremities: Intact distal pulses, No edema, No tenderness    DIAGNOSTIC STUDIES / PROCEDURES    Results for orders placed or performed during the hospital encounter of 06/18/20   CBC WITH DIFFERENTIAL   Result Value Ref Range    WBC 9.8 4.8 - 10.8 K/uL    RBC 3.08 (L) 4.70 - 6.10 M/uL    Hemoglobin 10.2 (L) 14.0 - 18.0 g/dL    Hematocrit 29.4 (L) 42.0 - 52.0 %    MCV 95.5 81.4 - 97.8 fL    MCH 33.1 (H) 27.0 - 33.0 pg    MCHC 34.7 33.7 - 35.3 g/dL    RDW 51.8 (H) 35.9 - 50.0 fL    Platelet Count 95 (L) 164 - 446 K/uL    MPV 8.8 (L) 9.0 - 12.9 fL    Neutrophils-Polys 77.40 (H) 44.00 - 72.00 %    Lymphocytes 12.20 (L) 22.00 - 41.00 %    Monocytes 9.40 0.00 - 13.40 %    Eosinophils 0.00 0.00 - 6.90 %    Basophils 0.20 0.00 - 1.80 %    Immature Granulocytes 0.80 0.00 - 0.90 %    Nucleated RBC 0.00 /100 WBC    Neutrophils (Absolute) 7.55 (H) 1.82 - 7.42 K/uL    Lymphs (Absolute) 1.19 1.00 - 4.80 K/uL    Monos (Absolute) 0.92 (H) 0.00 - 0.85 K/uL    Eos (Absolute) 0.00 0.00 - 0.51 K/uL    Baso (Absolute) 0.02 0.00 - 0.12 K/uL    Immature Granulocytes (abs) 0.08 0.00 - 0.11 K/uL    NRBC (Absolute) 0.00 K/uL   COMP METABOLIC PANEL   Result Value Ref Range    Sodium 133 (L) 135 - 145 mmol/L    Potassium 4.2 3.6 - 5.5 mmol/L    Chloride 92 (L) 96 - 112 mmol/L     Co2 29 20 - 33 mmol/L    Anion Gap 12.0 7.0 - 16.0    Glucose 167 (H) 65 - 99 mg/dL    Bun 14 8 - 22 mg/dL    Creatinine 2.33 (H) 0.50 - 1.40 mg/dL    Calcium 9.7 8.5 - 10.5 mg/dL    AST(SGOT) 17 12 - 45 U/L    ALT(SGPT) 13 2 - 50 U/L    Alkaline Phosphatase 134 (H) 30 - 99 U/L    Total Bilirubin 0.6 0.1 - 1.5 mg/dL    Albumin 4.2 3.2 - 4.9 g/dL    Total Protein 7.3 6.0 - 8.2 g/dL    Globulin 3.1 1.9 - 3.5 g/dL    A-G Ratio 1.4 g/dL   TSH   Result Value Ref Range    TSH 10.000 (H) 0.380 - 5.330 uIU/mL   FREE THYROXINE   Result Value Ref Range    Free T-4 0.90 (L) 0.93 - 1.70 ng/dL   ESTIMATED GFR   Result Value Ref Range    GFR If  33 (A) >60 mL/min/1.73 m 2    GFR If Non  27 (A) >60 mL/min/1.73 m 2   TROPONIN   Result Value Ref Range    Troponin T 103 (H) 6 - 19 ng/L   EKG   Result Value Ref Range    Report       [facility] Emergency Dept.    Test Date:  2020  Pt Name:    KLARISSA BRADSHAW              Department: ER  MRN:        [id number]                      Room:       [facility]  Gender:     Male                         Technician: [id number]  :        [id number]                   Requested By:ER TRIAGE PROTOCOL  Order #:    [id number]                    Reading MD: KD REYNA    Measurements  Intervals                                Axis  Rate:       86                           P:          55  WV:         192                          QRS:        -68  QRSD:       134                          T:          56  QT:         408  QTc:        488    Interpretive Statements  SINUS RHYTHM  RBBB AND LAFB  Compared to ECG 2020 09:54:15  Ventricular premature complex(es) no longer present  Unchanged morphology from previous  No acute ischemia  Electronically Signed On 2020 13:19:29 PDT by KD REYNA         Prior records reviewed patient was admitted for something similar in December of last year.  He has a history of hyperthyroidism and his thyroid level was  elevated.    COURSE & MEDICAL DECISION MAKING  Pertinent Labs & Imaging studies reviewed. (See chart for details)    This is a 74-year-old gentleman has a history of diabetes and end-stage renal disease on dialysis who had an episode of dizziness while he was being dialyzed.  He did complete his dialysis.  He has no chest pain.  His EKG is unremarkable and unchanged.  His electrolytes are within normal limits he was dialyzed appropriately.  Troponin is elevated but he has a chronic elevated troponin and this appears to be where he typically is.  His thyroid tests are quite different from previous.  In the past he had an episode like this and his symptoms were thought to be from his uncontrolled hyperthyroidism however it is unclear if that is what is causing his symptoms at this time.  I recommended that he follow-up with his primary care doctor with regards to these thyroid tests.  He has been asymptomatic since he arrived his vital signs are all normal.  He is not hypotensive he is not tachycardic he is not hypoxic.  I do think he has good follow-up and given that he is persistently symptomatic and has a normal work-up I do think he can be discharged with outpatient follow-up.  Patient is agreeable to this plan.     The patient will return for new or worsening symptoms and is stable at the time of discharge. Patient was given return precautions. Patient and/or family member verbalizes understanding and will comply.    DISPOSITION:  Patient will be discharged home in stable condition.    FOLLOW UP:  Patito Edgar M.D.  [address] [place] [address] Dr Lucas FERRELL [id number] [phone]      As needed    [facility], Emergency Dept  [address] [facility]  [facility] [address]  [phone]    Worsening symptoms chest pain nausea vomiting or other concerns        OUTPATIENT MEDICATIONS:  Discharge Medication List as of 6/18/2020  2:11 PM          Your blood pressure is elevated here in the emergency department.  Please monitor your blood pressure over the next several days. If your blood pressure continues to be 120/80 or higher please contact your physician for blood pressure management.       FINAL IMPRESSION  1. Near syncope     2. Dizziness         2.   3.     This dictation has been creating using voice recognition software. The accuracy of the dictation is limited the abilities of the software.  I expect there may be some errors of grammar and possibly content. I made every attempt to manually correct the errors within my dictation. However errors related to this voice recognition software may still exist and should be interpreted within the appropriate context.        The note accurately reflects work and decisions made by me.  Jennfier Mark M.D.  6/18/2020  3:14 PM

## 2020-08-24 NOTE — TELEPHONE ENCOUNTER
Received clearance request from [facility] for a fistulagram with possible angioplasty scheduled for 9/2/20 at 8:00 a.m. Original date on form was 8/24/20 but confirmed with facility that the date was moved due patient illness.

## 2020-08-24 NOTE — LETTER
PROCEDURE/SURGERY CLEARANCE FORM      Encounter Date: 8/24/2020    Patient: Luis Sepulveda  YOB: 1945    CARDIOLOGIST:  Dr. Kulwant Osborn M.D.   REFERRING DOCTOR:  Dr. Cindi Stein M.D.     The above patient is low risk to have the following procedure/surgery: Fistulagram with possible angioplasty.                                           Additional comments: May hold Eliquis x 48 hours prior to procedure. Decision to hold Plavix will be made by Nephrology.    Sincerely,  Dr. Kulwant Osborn M.D.  ELECTRONIC SIGNATURE

## 2020-08-28 ENCOUNTER — HOSPITAL ENCOUNTER (OUTPATIENT)
Dept: HOSPITAL 8 - CVU | Age: 75
Discharge: HOME | End: 2020-08-28
Attending: ORTHOPAEDIC SURGERY
Payer: COMMERCIAL

## 2020-08-28 DIAGNOSIS — E11.9: ICD-10-CM

## 2020-08-28 DIAGNOSIS — I25.9: ICD-10-CM

## 2020-08-28 DIAGNOSIS — E78.5: ICD-10-CM

## 2020-08-28 DIAGNOSIS — I25.2: ICD-10-CM

## 2020-08-28 DIAGNOSIS — I25.10: ICD-10-CM

## 2020-08-28 DIAGNOSIS — I35.8: Primary | ICD-10-CM

## 2020-08-28 PROCEDURE — 93306 TTE W/DOPPLER COMPLETE: CPT

## 2020-08-28 NOTE — TELEPHONE ENCOUNTER
Spoke with patient and assessed for new cardiac symptoms. Patient reports none.    Last echo 4/22/20: EF 45%. No valve abnormality based on doppler.    No hx of CVA. EKG 6/18/20 from ED SR, RBBB AND LAFB.     Patient on Eliquis for PAF and Plavix for LAD stent.        Letter drafted and faxed to [facility] Thompson at [phone]. Completed fax confirmation received.

## 2020-10-14 NOTE — TELEPHONE ENCOUNTER
Phone Number Called: [phone] (home)       Call outcome: Did not leave a detailed message. Requested patient to call back.    Message: 1st attempt LVM for patient to call back to schedule a VV or OV for his medication (Plavix) refill.

## 2020-10-20 PROBLEM — G93.41 ACUTE METABOLIC ENCEPHALOPATHY: Status: RESOLVED | Noted: 2019-11-22 | Resolved: 2020-01-01

## 2020-10-20 NOTE — PROGRESS NOTES
Telemedicine Visit: Established Patient     This Remote Face to Face encounter was conducted via Zoom. Given the importance of social distancing and other strategies recommended to reduce the risk of COVID-19 transmission, I am providing medical care to this patient via audio/video visit in place of an in person visit at the request of the patient. Verbal consent to telehealth, risks, benefits, and consequences were discussed. Patient retains the right to withdraw at any time. All existing confidentiality protections apply. The patient has access to all transmitted medical information. No dissemination of any patient images or information to other entities without further written consent.    CHIEF COMPLAINT     Chief Complaint   Patient presents with   • Medication Refill   HTN    HPI  Luis Sepulveda is a 74 y.o. male who presents today for the following     DM 2 controlled with neuropathy and ESRD / anemia of chronic disease   Onset/Dg: in his 40'  Diabetes education: no     Medications:   · Insulin: Lantus 5 U HS  · ACE/ARB: no  · Statin: no  · ASA: no on apixaban  Compliant with medications: yes  Checking feet daily/wear soft socks/shoes: advised     Diabetes ABCDE TARGETS  · A1c, last: 5.3  · Fingersticks: intermittent  · Mean BS:   · Hypoglycemia: no  ? No symptoms of hypoglycemia: tremors, hunger, sometimes dizzy  · Blood Pressure, goal < 140/90: yes  · Cholesterol-Lipid Panel: LDL 61  · Dysalbuminuria: pos  · Vitamin D: 25; prescription supplement     Diet: regular  Exercise: insufficient  BMI: 28     DM complications:  · Peripheral neuropathy:           C/o numbness or tingling in feet.  · Retinopathy:                            Last eye exam: 9/2018. No retinopathy.   · Nephropathy:                          ESRD, anemia of CKD on Epo; f/u by nephrology  · CVS:                                        Has CAD  · GI:                                           No gastropathy     FH of DM: Brother      Hypertension, CAD/stented LAD, diastolic dysfunction, paroxysmal afib/on anticoagulation with Eliquis  74-year-old, male, history of controlled hypertension, coronary artery disease, stented LAD in the [facility], grade 1 diastolic dysfunction, paroxysmal A. Fib, anticoagulated with Eliquis.     He has had recently multiple admissions/hospitalizations, due to shortness of breath, tachycardia, postdialysis hypotension.     The last hospitalization was in mid February 2020, less than 1 month ago, discharge summary was he was reviewed, with the following:  -Principal problem is dysphagia shortness of breath  -Barium swallow showed that she having contractions  -Advised to follow-up with neurosurgery as needed     Medications: Carvedilol 6.25 mg twice daily, Lasix 40 mg as needed, Eliquis 5 mg twice daily  Taking as prescribed.   He is not measuring blood pressure at home  Denies: - headaches, vision problems, tinnitus.  - chest pain/pressure, palpitations, irregular heart beats, exertional, dyspnea, peripheral edema.  Low salt diet: yes  Diet / exercise / BMI: as above.   FH of HTN: Negative     Echocardiography, 11/22/2019  No prior study is available for comparison.   Normal left ventricular chamber size.  Left ventricular ejection fraction is visually estimated to be 45%.  Grade I diastolic dysfunction.  Moderately dilated left atrium.  Normal inferior vena cava size and inspiratory collapse.  Evidence of delayed (>5 beat) right to left shunt suggestive of   pulmonary arterio-venous malformation.      Dyslipidemia  No medications.  Pending lipid panel.  Diet / exercise / BMI: as above.   FH: Negative     Hyperthyroidism, thyroid nodule  Methimazole, 5 mg daily, taking as prescribed  No temperature intolerance. No change in weight, hair/skin quality, BMs.   No tremors, weakness.  No peripheral swelling.  No mood changes.  FH: son  US thyroid showed large thyroid nodules  - negative/benign bx  8/23/18  Referred to endocrinology, did not schedule OV.     Thyroid US, 8/14/18:  The thyroid gland is heterogeneous.  Vascularity is normal.  The right lobe of the thyroid gland measures 2.00 cm x 5.79 cm x 2.23 cm.  The left lobe of the thyroid gland measures 1.97 cm x 4.51 cm x 1.84 cm.  The isthmus measures 0.65 cm.  Bilateral solid masses are confirmed.  Upper pole right thyroid lobe mass measured 21 x 13 x 8 mm with moderate vascularity. This is well circumscribed and slightly hypoechoic.  Right interpolar mass is hypervascular measuring 13 x 12 x 9 mm.  Left upper pole mass is solid measuring 6 mm maximally with some adjacent vascularity. Additional smaller masses     Reviewed PMH, PSH, FH, SH, ALL, HCM/IMM, no changes  Reviewed MEDS, no changes    Patient Active Problem List    Diagnosis Date Noted   • Peripheral vascular disease (HCC) 04/23/2020     Priority: High   • Dysphagia 02/18/2020     Priority: High   • NSTEMI (non-ST elevated myocardial infarction) ([facility]) 11/11/2019     Priority: High   • Polyarthritis 06/25/2018     Priority: High   • Elevated troponin 11/28/2019     Priority: Medium   • Paroxysmal A-fib ([facility]) 11/16/2019     Priority: Medium   • Diabetes mellitus type 2 in nonobese ([facility]) 08/13/2019     Priority: Medium   • End stage renal disease on dialysis ([facility]) 08/13/2019     Priority: Medium   • Essential hypertension 08/15/2018     Priority: Medium   • Anemia of chronic disease 08/15/2018     Priority: Medium   • CAD (coronary artery disease) 06/25/2018     Priority: Medium   • Hyperthyroidism 06/25/2018     Priority: Low   • Coronary artery disease, non-occlusive 06/02/2020   • Cardiomyopathy, ischemic 06/02/2020   • Anticoagulated 06/02/2020   • Left anterior fascicular block 06/02/2020   • Right bundle branch block 06/02/2020   • Diastolic dysfunction 03/26/2020   • Presence of stent in LAD coronary artery 03/26/2020   • Hypotension due to hypovolemia 12/02/2019   • Acute metabolic  encephalopathy 11/22/2019   • Secondary hyperparathyroidism of renal origin (HCC) 11/15/2019   • Dyslipidemia 04/11/2019   • Thyroid nodule 04/11/2019   • Gastroesophageal reflux disease 08/15/2018   • Health care maintenance 08/15/2018   • Hepatic cyst 08/02/2018   • Renal cyst 08/02/2018     CURRENT MEDICATIONS  Current Outpatient Medications   Medication Sig Dispense Refill   • methimazole (TAPAZOLE) 5 MG Tab TAKE 1.5 TABLETS BY MOUTH  IN THE MORNING AND 1 TABLET IN THE  EVENING 225 Tab 0   • ondansetron (ZOFRAN ODT) 4 MG TABLET DISPERSIBLE      • apixaban (ELIQUIS) 2.5mg Tab Take 1 Tab by mouth 2 Times a Day. 180 Tab 3   • clopidogrel (PLAVIX) 75 MG Tab Take 1 Tab by mouth every day. 90 Tab 1   • calcitRIOL (ROCALTROL) 0.25 MCG Cap Take 0.25 mcg by mouth every day.     • carvedilol (COREG) 3.125 MG Tab Take 3.125 mg by mouth 2 times a day, with meals.     • insulin glargine (LANTUS SOLOSTAR) 100 UNIT/ML Solution Pen-injector injection Inject 5 Units as instructed every bedtime.     • pantoprazole (PROTONIX) 40 MG Tablet Delayed Response Take 40 mg by mouth every bedtime.     • calcium acetate (PHOS-LO) 667 MG Cap Take 1,334 mg by mouth 3 times a day, with meals.     • atorvastatin (LIPITOR) 40 MG Tab Take 1 Tab by mouth every bedtime. 30 Tab 0     No current facility-administered medications for this visit.      ALLERGIES  Allergies: Mircera [methoxy polyethylene glycol-epoetin beta]  PAST MEDICAL HISTORY  Past Medical History:   Diagnosis Date   • Chronic anticoagulation 3/26/2020   • CAD (coronary artery disease)     stents   • Chronic kidney disease (CKD), stage V (HCC)    • Congestive heart failure (HCC)    • Diabetes    • Hypertension    • Kidney failure    • Osteoarthritis    • Peripheral neuropathy      SURGICAL HISTORY  He  has a past surgical history that includes appendectomy; other orthopedic surgery; other cardiac surgery; toe amputation (Left, 5/17/2020); and tenotomy (Left, 5/17/2020).  SOCIAL  "HISTORY  Social History     Tobacco Use   • Smoking status: Former Smoker     Packs/day: 1.00     Years: 55.00     Pack years: 55.00     Types: Cigarettes     Quit date: 2014     Years since quittin.7   • Smokeless tobacco: Never Used   Substance Use Topics   • Alcohol use: No   • Drug use: No     Social History     Social History Narrative   • Not on file     FAMILY HISTORY  Family History   Problem Relation Age of Onset   • Heart Disease Mother    • Alcohol/Drug Father    • Thyroid Son    • Diabetes Brother    • Hypertension Neg Hx    • Hyperlipidemia Neg Hx      Family Status   Relation Name Status   • Mo     • Fa     • Son  (Not Specified)   • Bro  (Not Specified)   • Neg Hx  (Not Specified)     ROS   Constitutional: Negative for fever, chills, fatigue.  HENT: Negative for congestion, sore throat.  Eyes: Negative for vision problems.   Respiratory: Negative for cough, shortness of breath.  Cardiovascular: Negative for chest pain, palpitations.   Gastrointestinal: Negative for heartburn, nausea, abdominal pain.   Genitourinary: Negative for dysuria.  Musculoskeletal: Negative for significant myalgia, back and joint pain.   Skin: Negative for rash.   Neuro: Negative for dizziness, weakness and headaches.   Endo/Heme/Allergies: Does not bruise/bleed easily.   Psychiatric/Behavioral: Negative for depression.    Objective   Vitals obtained by patient:  Temperature 36.4 °C (97.5 °F)   Height 1.676 m (5' 6\")   Weight 80.4 kg (177 lb 4 oz)   Body Mass Index 28.61 kg/m²   Physical Exam:  Constitutional: Alert, no distress, well-groomed.  Skin: No rash in visible areas.  Eye: Round. Conjunctiva clear, lids normal.  ENMT: Lips pink without lesions, good dentition. Phonation normal.  Neck: No visible masses or thyromegaly. Moves freely without pain.  CV: no peripheral cyanosis, tachycardia.  Respiratory: Unlabored respiratory effort, no cough or audible wheezing.  Psych: Alert and oriented x3, " normal affect and mood.     Labs     Labs are reviewed and discussed with a patient  Lab Results   Component Value Date/Time    CHOLSTRLTOT 88 (L) 04/01/2020 09:34 AM    LDL 34 04/01/2020 09:34 AM    HDL 26 (A) 04/01/2020 09:34 AM    TRIGLYCERIDE 142 04/01/2020 09:34 AM       Lab Results   Component Value Date/Time    SODIUM 133 (L) 06/18/2020 11:24 AM    POTASSIUM 4.2 06/18/2020 11:24 AM    CHLORIDE 92 (L) 06/18/2020 11:24 AM    CO2 29 06/18/2020 11:24 AM    GLUCOSE 167 (H) 06/18/2020 11:24 AM    BUN 14 06/18/2020 11:24 AM    CREATININE 2.33 (H) 06/18/2020 11:24 AM     Lab Results   Component Value Date/Time    ALKPHOSPHAT 134 (H) 06/18/2020 11:24 AM    ASTSGOT 17 06/18/2020 11:24 AM    ALTSGPT 13 06/18/2020 11:24 AM    TBILIRUBIN 0.6 06/18/2020 11:24 AM      Lab Results   Component Value Date/Time    HBA1C 5.8 (H) 04/01/2020 09:34 AM    HBA1C 5.3 11/22/2019 10:53 AM    HBA1C 5.3 09/10/2019 11:03 AM     No results found for: TSH  Lab Results   Component Value Date/Time    FREET4 0.90 (L) 06/18/2020 11:24 AM    FREET4 1.43 04/23/2020 02:13 PM       Lab Results   Component Value Date/Time    WBC 9.8 06/18/2020 11:24 AM    RBC 3.08 (L) 06/18/2020 11:24 AM    HEMOGLOBIN 10.2 (L) 06/18/2020 11:24 AM    HEMATOCRIT 29.4 (L) 06/18/2020 11:24 AM    MCV 95.5 06/18/2020 11:24 AM    MCH 33.1 (H) 06/18/2020 11:24 AM    MCHC 34.7 06/18/2020 11:24 AM    MPV 8.8 (L) 06/18/2020 11:24 AM    NEUTSPOLYS 77.40 (H) 06/18/2020 11:24 AM    LYMPHOCYTES 12.20 (L) 06/18/2020 11:24 AM    MONOCYTES 9.40 06/18/2020 11:24 AM    EOSINOPHILS 0.00 06/18/2020 11:24 AM    BASOPHILS 0.20 06/18/2020 11:24 AM    ANISOCYTOSIS 1+ 11/28/2019 07:35 AM      Imaging      Reviewed and discussed per HPI    Assessment and Plan     Luis Sepulveda is a 74 y.o. male    1. Diabetes mellitus type 2 in nonobese (HCC)  Pending labs, continue current treatment  - HEMOGLOBIN A1C; Future  2. Diabetic polyneuropathy associated with type 2 diabetes mellitus  (HCC)  As above    3. End stage renal disease on dialysis (HCC)  Continue current tx, nephrology f/u  4. Anemia of chronic disease    5. Essential hypertension  Continue current tx, cardiology f/u  6. Coronary artery disease involving native coronary artery of native heart without angina pectoris  7. Diastolic dysfunction  8. Paroxysmal A-fib (HCC)  9. Anticoagulated  As [place]    10. Dyslipidemia  Pending labs, continue current treatment    11. Hyperthyroidism  Pending labs, continue current treatment  - FREE THYROXINE; Future  - TSH; Future  - T3 FREE; Future  - methimazole (TAPAZOLE) 5 MG Tab; TAKE 1.5 TABLETS BY MOUTH  IN THE MORNING AND 1 TABLET IN THE  EVENING  Dispense: 225 Tab; Refill: 0    12. Thyroid nodule  Reviewed US    Follow-up: within 2-3 weeks, labs

## 2020-11-04 NOTE — PATIENT INSTRUCTIONS
-Keep your wound dressing clean, dry, and intact.    -Change your dressing if it becomes soiled, soaked, or falls off.    -Should you experience any significant changes in your wound(s), such as infection (redness, swelling, localized heat, increased pain, fever > 101 F, chills) or have any questions regarding your home care instructions, please contact the wound center at [phone]. If after hours, contact your primary care physician or go to the hospital emergency room.

## 2020-11-04 NOTE — NON-PROVIDER
Non Provider Encounter- Diabetic Foot Ulcer      HISTORY OF PRESENT ILLNESS  Wound History:    START OF CARE IN CLINIC: 11/04/2020    REFERRING PROVIDER: Leobardo López M.D.   WOUND- Diabetic foot ulcer   LOCATION: Right distal plantar hallux   HISTORY: Patient presents to the clinic today with an eschar cap on his right distal plantar hallux. He states the wound first appeared approximatley 1.5 months ago, it started as a small black dot and kept growing from there. He has some pain the the site when standing. Per the Patient Dr. Encinas re-vascularized the right leg 2.5 weeks ago. The patient had a previous hallux amputation on the [facility] back in May of this year by Dr. Lynn      Pertinent Medical History: Diabetes type 2, NSTEMI, a-fib, End stage renal disease, peripheral vascular disease.    DIABETES HX: Diagnosed with type 2 diabetes early 1990's, and is currently managing with lantus.  Checks blood sugars 2-3 times daily and reports that these typically run around . Has had previous diabetes education. Does have numbness in feet.  Does not have diabetic shoes. Does check feet routinely. Has had previous foot surgery. Current occupation: retired. Offloading None.       TOBACCO USE: Patient states he quit smoking approximately 12 years ago.     OFFLOADING: Patient has no offloading.    Pertinent Labs and Diagnostics:    Labs:     A1c:   Lab Results   Component Value Date/Time    HBA1C 5.8 (H) 04/01/2020 09:34 AM          IMAGING: None in epic    VASCULAR STUDIES: 04/22/2020    LAST  WOUND CULTURE:  DATE : None in [facility]             FALL RISK ASSESSMENT-    X 65 years or older     Fall within the last 2 years   Uses ambulatory devices  X Loss of protective sensation in feet   Use of prostethic/orthotic    X Presence of lower extremity/foot/toe amputation   Taking medication that increases risk (per facility policy)    Interventions Recommended (if any of the above are selected):   Use of Assistive  Device: None   Supervision with ambulation: N/A   Assistance with ambulation: N/A   Home safety education: N/A    PAST MEDICAL HISTORY:   Past Medical History:   Diagnosis Date   • CAD (coronary artery disease)     stents   • Chronic anticoagulation 3/26/2020   • Chronic kidney disease (CKD), stage V (HCC)    • Congestive heart failure (HCC)    • Diabetes    • Hypertension    • Kidney failure    • Osteoarthritis    • Peripheral neuropathy        PAST SURGICAL HISTORY:   Past Surgical History:   Procedure Laterality Date   • TOE AMPUTATION Left 5/17/2020    Procedure: AMPUTATION, TOE GREAT;  Surgeon: Leobardo Lynn M.D.;  Location: SURGERY [facility];  Service: Orthopedics   • TENOTOMY Left 5/17/2020    Procedure: FLEXOR TENOTOMIES, TWO-FIVE TOES;  Surgeon: Leobardo Lynn M.D.;  Location: SURGERY [facility];  Service: Orthopedics   • APPENDECTOMY     • OTHER CARDIAC SURGERY      stents x1   • OTHER ORTHOPEDIC SURGERY      knee surgery        MEDICATIONS:   Current Outpatient Medications   Medication   • ondansetron (ZOFRAN) 4 MG Tab tablet   • silver sulfADIAZINE (SILVADENE) 1 % Cream   • calcium acetate (PHOS-LO) 667 MG Tab tablet   • clopidogrel (PLAVIX) 75 MG Tab   • methimazole (TAPAZOLE) 5 MG Tab   • ondansetron (ZOFRAN ODT) 4 MG TABLET DISPERSIBLE   • apixaban (ELIQUIS) 2.5mg Tab   • calcitRIOL (ROCALTROL) 0.25 MCG Cap   • carvedilol (COREG) 3.125 MG Tab   • insulin glargine (LANTUS SOLOSTAR) 100 UNIT/ML Solution Pen-injector injection   • pantoprazole (PROTONIX) 40 MG Tablet Delayed Response   • calcium acetate (PHOS-LO) 667 MG Cap   • atorvastatin (LIPITOR) 40 MG Tab     No current facility-administered medications for this visit.        ALLERGIES:    Allergies   Allergen Reactions   • Mircera [Methoxy Polyethylene Glycol-Epoetin Beta] Hives       SOCIAL HISTORY:   Social History     Socioeconomic History   • Marital status:      Spouse name: Not on file   • Number of children: Not on  file   • Years of education: Not on file   • Highest education level: Not on file   Occupational History   • Not on file   Social Needs   • Financial resource strain: Not hard at all   • Food insecurity     Worry: Never true     Inability: Never true   • Transportation needs     Medical: No     Non-medical: No   Tobacco Use   • Smoking status: Former Smoker     Packs/day: 1.00     Years: 55.00     Pack years: 55.00     Types: Cigarettes     Quit date: 2014     Years since quittin.7   • Smokeless tobacco: Never Used   Substance and Sexual Activity   • Alcohol use: No   • Drug use: No   • Sexual activity: Not on file   Lifestyle   • Physical activity     Days per week: Not on file     Minutes per session: Not on file   • Stress: Not on file   Relationships   • Social connections     Talks on phone: Not on file     Gets together: Not on file     Attends [employer] service: Not on file     Active member of club or organization: Not on file     Attends meetings of clubs or organizations: Not on file     Relationship status: Not on file   • Intimate partner violence     Fear of current or ex partner: Not on file     Emotionally abused: Not on file     Physically abused: Not on file     Forced sexual activity: Not on file   Other Topics Concern   • Not on file   Social History Narrative   • Not on file       FAMILY HISTORY:   Family History   Problem Relation Age of Onset   • Heart Disease Mother    • Alcohol/Drug Father    • Thyroid Son    • Diabetes Brother    • Hypertension Neg Hx    • Hyperlipidemia Neg Hx          WOUND ASSESSMENT-                  Wound 20 Right Distal Hallux (Active)   Wound Image    20 1030   Site Assessment Black;Eschar 20 1030   Periwound Assessment Dry;Callused 20 1030   Margins Attached edges 20 1030   Drainage Amount Scant 20 1030   Drainage Description Serous 20 1030   Treatments Cleansed;CSWD - Conservative Sharp Wound Debridement;Site care  11/04/20 1030   Wound Cleansing Puracyn Cleveland 11/04/20 1030   Periwound Protectant Not Applicable 11/04/20 1030   Dressing Cleansing/Solutions 3% Betadine 11/04/20 1030   Dressing Options Dry Gauze;Hypafix Tape 11/04/20 1030   Dressing Changed New 11/04/20 1030   Dressing Status Clean;Dry;Intact 11/04/20 1030   Wound Bed Eschar (%) 100 % 11/04/20 1030   Wound Odor None 11/04/20 1030   Pulses Right;DP;PT;Doppler 11/04/20 1030   Right Foot Monofilament 10-point exam (Sensate) 1/10 11/04/20 1030   Exposed Structures EMELIA 11/04/20 1030        Procedures:     -CSWD with curette to debride periwound callus. Periwound callus, ~ 2-3 cm2, debrided to skin level  -Refer to flowsheet for wound care details.       PATIENT EDUCATION  - Importance of tight glucose control for wound healing   - Implications of loss of protective sensation (LOPS) discussed with patient- including increased risk for amputation.  - Advised to check feet at least daily, moisturize feet, and to always wear protective foot wear.   -  Importance of offloading foot to assist with wound healing  - Advised pt not to trim nails or calluses, seek foot/nail care from podiatrist or certified foot/nail nurse  - Importance of adequate nutrition for wound healing  -Advised to go to ER for any increased redness, swelling, drainage or odor, or if patient develops fever, chills, nausea or vomiting.  -Importance of adequate nutrition for wound healing

## 2020-11-14 PROBLEM — S91.301A WOUND OF RIGHT FOOT: Status: ACTIVE | Noted: 2020-01-01

## 2020-11-14 PROBLEM — R07.9 CHEST PAIN: Status: ACTIVE | Noted: 2020-01-01

## 2020-11-14 PROBLEM — R07.9 CHEST PAIN: Status: RESOLVED | Noted: 2020-01-01 | Resolved: 2020-01-01

## 2020-11-14 NOTE — PROGRESS NOTES
"Pharmacy Kinetics 74 y.o. male on vancomycin day # 1   2020    Received Vancomycin loading dose 2000 mg iv x1 (0000)  Provider specified end date: 5 days     Indication for Treatment: SSTI     Pertinent history per medical record: Admitted on 2020 for diabetic foot infection (right toe) with plan of amputation Monday. Hx of T2DM, ESRD.     Other antibiotics: Zosyn 4.5g iv q12h     Allergies: Mircera [methoxy polyethylene glycol-epoetin beta]     List concerns for renal function: ESRD (CrCl 9.9 mL/min) , age, abnormal LFTs, concurrent use of Zosyn     Pertinent cultures to date:    - blood cx pending     MRSA nares swab if pneumonia is a concern (ordered/positive/negative/n-a): N/A    Recent Labs     20   WBC 15.2*   NEUTSPOLYS 80.90*     Recent Labs     20  2228   BUN 48*   CREATININE 6.31*   ALBUMIN 3.7     No results for input(s): VANCOTROUGH, VANCOPEAK, VANCORANDOM in the last 72 hours.    Intake/Output Summary (Last 24 hours) at 2020 0220  Last data filed at 2020 0013  Gross per 24 hour   Intake 1000 ml   Output no documentation   Net 1000 ml      Blood Pressure 142/74   Pulse 83   Temperature 37.4 °C (99.3 °F)   Respiration 17   Height 1.676 m (5' 6\")   Weight 80 kg (176 lb 5.9 oz)   Oxygen Saturation 99%  Temp (24hrs), Av.4 °C (99.3 °F), Min:37.4 °C (99.3 °F), Max:37.4 °C (99.3 °F)      A/P   1. Vancomycin dose change: new start vanco   2. Next vancomycin level:  at 1200 - ordered (approx 12 hours post-loading dose)  3. Goal trough: 10-15 mcg/mL   4. Comments: Due to above renal concerns, will obtain a random vanco trough prior to further dosing. Pharmacy will continue to follow and recommend de-escalation of antibiotics as appropriate.        Veronica Denise, PharmD      "

## 2020-11-14 NOTE — PROGRESS NOTES
Higher Level of Care. Report received from Edna Don Spine, RN. Updated on POC. Assumed care of patient upon arrival to unit. Patient currently A & O x 4; on RA; patient transported to dialysis via transport without complaints of acute pain. Pt placed on monitor, monitor room notified, SR 86. Patient notified of room change. Personal belongings brought to [facility] 80. All questions answered. No other needs indicated at this time.

## 2020-11-14 NOTE — PROGRESS NOTES
Assumed care of patient at 0700. Patient is alert and oriented, respirations are unlabored and regular. Patient lying in bed. Lung sounds clear throughout on 1L at 98%, weaning as appropriate. Abdomen soft, non tender, +bowel sounds. Oliguric, voided this AM. Right great toe necrotic,black, numbness/tingling, pain is 9 out of 10, appropriate pain medication administered. Left ball of foot with wound, ALHAJI. Wound consult in place. Patient anxious to get dialysis today, waiting for neph consult. Bed in lowest position, call light within reach, patient states no needs at this time.

## 2020-11-14 NOTE — ED NOTES
Pt c/o improved but persistent R foot/great toe pain 4-7/10. ERP updated, new order received, medicated per MAR. Lactic acid drawn and sent. IV abx infusing. Placed on 1L NC for intermittent desaturation 86% on RA. Needs met.

## 2020-11-14 NOTE — PROGRESS NOTES
2 RN skin check completed with Yajaira RN.  Right great toe black and purple with open wound.  Left plantar foot wound.  No other skin break down noted.

## 2020-11-14 NOTE — ASSESSMENT & PLAN NOTE
-s/p LAD stent on Plavix at home   -Continuing home Atorvastatin  -Resumed home Eliquis and Plavix on 11/18/2020

## 2020-11-14 NOTE — DISCHARGE PLANNING
Care Transition Team Assessment    Per Chart Review patient lives with ex-wife who is his support system and helps with driving and transportation needs. Plan is for patient to have amputation of great toe while in hospital. Unknown at this time what his discharge needs will be after surgery.  I    nformation Source  Information Given By: (chart review during COVID Pandemic)  Who is responsible for making decisions for patient? : Patient    Readmission Evaluation  Is this a readmission?: No    Elopement Risk  Legal Hold: No  Ambulatory or Self Mobile in Wheelchair: No-Not an Elopement Risk    Interdisciplinary Discharge Planning  Does Admitting Nurse Feel This Could be a Complex Discharge?: No  Primary Care Physician: Dr. Singh-Anshu  Lives with - Patient's Self Care Capacity: Other (Comments)(ex-wife)  Support Systems: Other (Comments)(ex-wife)  Housing / Facility: 1 Story House  Able to Return to Previous ADL's: Yes  Assistance Needed: Unknown at this Time    Discharge Preparedness  What are your discharge supports?: Other (comment)(ex-wife)  Prior Functional Level: Independent with Activities of Daily Living  Difficulity with ADLs: None  Difficulity with IADLs: None    Functional Assesment  Prior Functional Level: Independent with Activities of Daily Living    Finances  Financial Barriers to Discharge: No    Vision / Hearing Impairment  Vision Impairment : Yes  Right Eye Vision: Wears Glasses  Left Eye Vision: Wears Glasses  Hearing Impairment : No         Advance Directive  Advance Directive?: None    Domestic Abuse  Have you ever been the victim of abuse or violence?: No         Discharge Risks or Barriers  Discharge risks or barriers?: No  Patient risk factors: Vulnerable adult    Anticipated Discharge Information  Discharge Address: [address] [place] [address] Lucas FERRELL  Discharge Contact Phone Number: [phone]

## 2020-11-14 NOTE — CONSULTS
"Case Summary:  74 y.o. male presenting with a chief complaint of right great toe pain. He is a known diabetic with complicated past medical history. Has been followed by Dr. Lynn in the office for worsening right foot infection. Recommended presenting to ER for medical management and surgical treatment of right foot. Complains of severe pain in right great toe.      Ortho Exam / Vitals / Weight:  Body mass index is 29.5 kg/m².  Height: 167.6 cm (5' 6\") Weight: 82.9 kg (182 lb 12.2 oz)  /67   Pulse 86   Temp 36.5 °C (97.7 °F) (Temporal)   Resp 17   Ht 1.676 m (5' 6\")   Wt 82.9 kg (182 lb 12.2 oz)   SpO2 98%   BMI 29.50 kg/m²       Orthopedic Physical Exam:  GEN: NAD, resting comfortably, nonlabored breathing on RA, pleasant and conversational      KATELYNN:  - gangrenous right great toe  - forefoot swelling  - fires tibial and peroneal nerves appropriately  - no palpable pulses  - compartments soft and compressible  - diminished sensation globally          Lab:    Recent Labs     11/13/20 2228   WBC 15.2*   RBC 2.52*   HEMOGLOBIN 8.5*   HEMATOCRIT 25.7*   .0*   MCH 33.7*   MCHC 33.1*   RDW 51.8*   PLATELETCT 402   MPV 8.8*     Recent Labs     11/13/20 2228 11/14/20  0917   SODIUM 134*  --    POTASSIUM 4.4 4.3   CHLORIDE 91*  --    CO2 27  --    GLUCOSE 158*  --    BUN 48*  --    CPKTOTAL 81  --              Past Medical / Past Surgical:  Past Medical History:   Diagnosis Date   • CAD (coronary artery disease)     stents   • Chronic anticoagulation 3/26/2020   • Chronic kidney disease (CKD), stage V (HCC)    • Congestive heart failure (HCC)    • Diabetes    • Hypertension    • Kidney failure    • Osteoarthritis    • Peripheral neuropathy       Past Surgical History:   Procedure Laterality Date   • TOE AMPUTATION Left 5/17/2020    Procedure: AMPUTATION, TOE GREAT;  Surgeon: Leobardo Lynn M.D.;  Location: SURGERY [facility];  Service: Orthopedics   • TENOTOMY Left 5/17/2020    Procedure: " FLEXOR TENOTOMIES, TWO-FIVE TOES;  Surgeon: Leobardo Lynn M.D.;  Location: SURGERY [facility];  Service: Orthopedics   • APPENDECTOMY     • OTHER CARDIAC SURGERY      stents x1   • OTHER ORTHOPEDIC SURGERY      knee surgery       Home Medications:  [unfilled]     Allergies:    Mircera [methoxy polyethylene glycol-epoetin beta]    Social / Family Histories:    Social History     Tobacco Use   • Smoking status: Former Smoker     Packs/day: 1.00     Years: 55.00     Pack years: 55.00     Types: Cigarettes     Quit date: 2014     Years since quittin.8   • Smokeless tobacco: Never Used   Substance Use Topics   • Alcohol use: No     @IPCUSTX([id number])@  Social History     Social History Narrative   • Not on file      Family History   Problem Relation Age of Onset   • Heart Disease Mother    • Alcohol/Drug Father    • Thyroid Son    • Diabetes Brother    • Hypertension Neg Hx    • Hyperlipidemia Neg Hx        Review of Systems:    Per HPI. The remainder of the review of systems is negative/non-contributory.       Imaging / Other Studies:    Osteomyelitis of great toe      Assessment and Recommendations:  74 y.o. male with diabetic right foot infection, gangrenous great toe. Has elevated troponins in setting of CHF, being trended. Seen for inpatient dialysis. IV abx. Will plan for right great toe amputation Monday with Dr. Lynn.

## 2020-11-14 NOTE — ASSESSMENT & PLAN NOTE
-In sinus rhythm on admission  -Rate is controlled. Coreg 3.125 bid  -Holding Eliquis for possible additional procedures

## 2020-11-14 NOTE — ED TRIAGE NOTES
Patient sent by Dr Lynn after a follow up yesterday for a wound on his right toe.  Provider wanted him to be admitted to the hospital for antibiotics and pain medications for amputation Monday.  Hx of dm2, ESRD, PVD

## 2020-11-14 NOTE — H&P
"History & Physical Note    Date of Admission: 11/14/2020  Admission Status: Inpatient  Attending: Dr. Kaya Fan  Contact Number: [phone]    Chief Complaint: Right great toe wound    History of Present Illness (HPI):  Luis is a 74 y.o. male with PMH of severe PVD, DM s/p Left toe amputations, CAD s/p LAD stent, PAF, ESRD on TTHS HD, hyperthyroidism who presented 11/13/2020 with right great toe wound that he has had for 2 months. He is followed by vascular surgery outpatient and was evaluated and underwent revascularization with a balloon angioplasty about 1 month ago. He has baseline severely diminished sensation in his bilateral feet. However about 3 days ago he started to feel intense up to 10/10 throbbing and aching pain in his right toe and extending proximally along the medial plantar surface of his right foot. The pain was severe enough that he became unable to bear weight on it. He says he has been feeling subjective fever and chills however reports home temperatures have been all less than 100F. He was evaluated for this acute on chronic worsening of the wound by his vascular surgeon who confirmed that his blood flow in the RLE s/p procedure was still doing well. However he still referred the patient to come to the hospital for further evaluation.     Otherwise patient also reports for past month having brief 20-30 intermittent episodes of chest discomfort described as when you \"take in a big breath of cold air\". It is mild in severity, non-radiating, non-exertional, self-resolving and patient denies that it feels anything like his chest pain from prior NSTEMI.     ED Course:  -Afebrile and HD stable  -Leukocytosis of 15.2 with elevated ESR and CRP  -Normal lactate and CPK  -Elevated troponin, EKG sinus rhythm with RBBB and LAFB without ischemic/infarct changes  -Blood cultures drawn and started on Vancomycin and Unasyn  -S/p  mg  -S/p IV Dilaudid 1 mg total  -NS 2.4 L bolus  -Orthopedics and " Nephrology consulted    Review of Systems:   Review of Systems   Constitutional: Positive for chills and fever. Negative for diaphoresis and malaise/fatigue.   HENT: Negative for hearing loss.    Eyes: Negative for blurred vision and double vision.   Respiratory: Negative for cough, sputum production and shortness of breath.    Cardiovascular: Positive for chest pain. Negative for palpitations, orthopnea, leg swelling and PND.   Gastrointestinal: Negative for abdominal pain, blood in stool, constipation, diarrhea, melena, nausea and vomiting.   Genitourinary: Negative for dysuria, frequency, hematuria and urgency.   Musculoskeletal: Negative for falls and myalgias.        Right great toe wound with clear drainage   Skin: Negative for rash.   Neurological: Negative for dizziness, loss of consciousness, weakness and headaches.   Psychiatric/Behavioral: Negative for depression.       Past Medical History:   Past Medical History was reviewed with patient.   has a past medical history of CAD (coronary artery disease), Chronic anticoagulation (3/26/2020), Chronic kidney disease (CKD), stage V (HCC), Congestive heart failure (HCC), Diabetes, Hypertension, Kidney failure, Osteoarthritis, and Peripheral neuropathy.    Past Surgical History: Past Surgical History was reviewed with patient.   has a past surgical history that includes appendectomy; other orthopedic surgery; other cardiac surgery; toe amputation (Left, 5/17/2020); and tenotomy (Left, 5/17/2020).    Medications: Medications have been reviewed with patient.  Prior to Admission Medications   Prescriptions Last Dose Informant Patient Reported? Taking?   apixaban (ELIQUIS) 2.5mg Tab   No No   Sig: Take 1 Tab by mouth 2 Times a Day.   atorvastatin (LIPITOR) 40 MG Tab  Patient No No   Sig: Take 1 Tab by mouth every bedtime.   calcitRIOL (ROCALTROL) 0.25 MCG Cap  Patient Yes No   Sig: Take 0.25 mcg by mouth every day.   calcium acetate (PHOS-LO) 667 MG Cap  Patient Yes  No   Sig: Take 1,334 mg by mouth 3 times a day, with meals.   calcium acetate (PHOS-LO) 667 MG Tab tablet   Yes No   carvedilol (COREG) 3.125 MG Tab  Patient Yes No   Sig: Take 3.125 mg by mouth 2 times a day, with meals.   clopidogrel (PLAVIX) 75 MG Tab   No No   Sig: TAKE 1 TABLET BY MOUTH  DAILY   insulin glargine (LANTUS SOLOSTAR) 100 UNIT/ML Solution Pen-injector injection  Patient Yes No   Sig: Inject 5 Units as instructed every bedtime.   methimazole (TAPAZOLE) 5 MG Tab   No No   Sig: TAKE 1.5 TABLETS BY MOUTH  IN THE MORNING AND 1 TABLET IN THE  EVENING   ondansetron (ZOFRAN ODT) 4 MG TABLET DISPERSIBLE   Yes No   ondansetron (ZOFRAN) 4 MG Tab tablet   Yes No   Sig: Take 1 Tab by mouth.   pantoprazole (PROTONIX) 40 MG Tablet Delayed Response  Patient Yes No   Sig: Take 40 mg by mouth every bedtime.   silver sulfADIAZINE (SILVADENE) 1 % Cream   Yes No   Sig: APPLY TO AREA ONCE DAILY AS DIRECTED BY DOCTOR      Facility-Administered Medications: None        Allergies: Allergies have been reviewed with patient.  Allergies   Allergen Reactions   • Mircera [Methoxy Polyethylene Glycol-Epoetin Beta] Hives       Family History:   family history includes Alcohol/Drug in his father; Diabetes in his brother; Heart Disease in his mother; Thyroid in his son.     Social History:   Tobacco: Former smoker. Quit 12 years ago. 1ppd for over 30 years  Alcohol: Denies ETOH use for many years  Recreational drugs (illegal and prescription):  Denies any current/prior illicit drug use   Recent travel:  Denies any recent travel history  Primary Care Provider: reviewed Patito Edgar M.D.  Other (stressors, spirituality, exposures):  None identified at this time    Physical Exam:   Vitals:  Temp:  [37.4 °C (99.3 °F)] 37.4 °C (99.3 °F)  Pulse:  [83-98] 83  Resp:  [13-19] 17  BP: (137-163)/() 142/74  SpO2:  [87 %-99 %] 99 %    Physical Exam  Vitals signs and nursing note reviewed.   Constitutional:       General: He is not  in acute distress.     Appearance: Normal appearance. He is not diaphoretic.   HENT:      Head: Normocephalic and atraumatic.      Nose: Nose normal.      Mouth/Throat:      Mouth: Mucous membranes are moist.      Pharynx: Oropharynx is clear.   Eyes:      Extraocular Movements: Extraocular movements intact.      Conjunctiva/sclera: Conjunctivae normal.      Pupils: Pupils are equal, round, and reactive to light.   Neck:      Musculoskeletal: Neck supple.   Cardiovascular:      Rate and Rhythm: Normal rate and regular rhythm.      Heart sounds: Normal heart sounds. No murmur. No friction rub. No gallop.       Comments: Pulses heard on bedside doppler bilaterally  Pulmonary:      Effort: Pulmonary effort is normal. No respiratory distress.      Breath sounds: No wheezing, rhonchi or rales.   Abdominal:      General: Abdomen is flat. Bowel sounds are normal. There is no distension.      Palpations: Abdomen is soft.      Tenderness: There is no abdominal tenderness. There is no guarding.   Musculoskeletal:      Right lower leg: No edema.      Left lower leg: No edema.      Comments: Black eschar along right great toe, open wound along plantar surface of toe, no active drainage, tenderness to palpation along medial base of right great toe, other wise diminished to no sensation    Healing blood blister along plantar surface near left great toe amputation site   Skin:     General: Skin is warm.      Comments: Erythema surround right great toe extending up to mid foot   Neurological:      Mental Status: He is alert and oriented to person, place, and time. Mental status is at baseline.   Psychiatric:         Mood and Affect: Mood normal.         Labs:   Lab Results   Component Value Date/Time    WBC 15.2 (H) 11/13/2020 10:28 PM    RBC 2.52 (L) 11/13/2020 10:28 PM    HEMOGLOBIN 8.5 (L) 11/13/2020 10:28 PM    HEMATOCRIT 25.7 (L) 11/13/2020 10:28 PM    .0 (H) 11/13/2020 10:28 PM    MCH 33.7 (H) 11/13/2020 10:28 PM     MCHC 33.1 (L) 11/13/2020 10:28 PM    MPV 8.8 (L) 11/13/2020 10:28 PM    NEUTSPOLYS 80.90 (H) 11/13/2020 10:28 PM    LYMPHOCYTES 9.80 (L) 11/13/2020 10:28 PM    MONOCYTES 8.10 11/13/2020 10:28 PM    EOSINOPHILS 0.10 11/13/2020 10:28 PM    BASOPHILS 0.30 11/13/2020 10:28 PM    ANISOCYTOSIS 1+ 11/28/2019 07:35 AM      Lab Results   Component Value Date/Time    SODIUM 134 (L) 11/13/2020 10:28 PM    POTASSIUM 4.4 11/13/2020 10:28 PM    CHLORIDE 91 (L) 11/13/2020 10:28 PM    CO2 27 11/13/2020 10:28 PM    GLUCOSE 158 (H) 11/13/2020 10:28 PM    BUN 48 (H) 11/13/2020 10:28 PM    CREATININE 6.31 (HH) 11/13/2020 10:28 PM          EKG: Per my read, sinus rhythm with RBBB and LAFB, no ST changes concerning for ischemia/infarct    Imaging:   DX-CHEST-PORTABLE (1 VIEW)    (Results Pending)   DX-FOOT-COMPLETE 3+ RIGHT    (Results Pending)   US-EXTREMITY ARTERY LOWER BILAT W/PATSY (COMBO)    (Results Pending)       Previous Data Review: reviewed    Problem Representation:     Patient is a 73 y/o M with history of severe PVD recent balloon angioplasty in RLE and DM and left toe amputation presenting with 2 month long right toe wound that has been followed by vascular outpatient and didn't improve with revascularization procedure 1 month ago, and blood flow was evaluated yesterday and confirmed to be good despite a recent 3 day exacerbation with intense pain and clear drainage of the wound and associated subjective fever and chills. Patient has been afebrile and HD stable with leukocytosis, elevated ESR and CRP, negative lactic and CPK, with foot x-ray showing gas in the tissue, however negative for OM. Due to chronicity and timeline of events in setting of foot wound having openings it is less likely to be necrotizing fasciitis from gas forming bacteria however patient will be admitted for IV antibiotic treatment and orthopedic/limb preservation service evaluation for possible surgical intervention.    Wound of right foot- (present on  admission)  Assessment & Plan  -History of right great toe wound for 2 months that has had acute exacerbation with increased pain for last 3 days with clear liquid drainage  -Followed by vascular surgery outpatient s/p revascularization of RLE about 1 month ago  -Seen in office with vascular yesterday that confirmed his vascular flow is good in RLE, but referred patient to ED  -Afebrile and HD stable.  -Leukocytosis, ESR >120, CRP 24.4.   -Normal lactic acid and CPK  -FXR: shows evidence of gas in the tissue, however negative for osteomyelitis, fracture, or dislocation    Plan:  -Admit to orthopedics  -Orthopedics consulted from ED - will evaluate patient in AM for possible procedure  -NPO sips w/ meds  -Holding home Eliquis and Plavix  -Pain: Acetaminophen 650 mg q6h PRN and Morphine 4 mg q6h PRN   -Abx: IV Vancomycin and IV Zosyn  -Blood cultures collected  -Limb preservation service consult  -Wound care consult    Chest pain  Assessment & Plan  -History of intermittent chest pain, non-radiating, intermittent, self resolving, lasting about 20-30 seconds  -EKG sinus rhythm with RBBB and LAFB, no ST changes concerning for ischemia or infarct  -Troponin 142 --> 131  -No active chest pain at this time    Plan:  -Troponin down trending  -EKG and stat troponin PRN if chest pain is to recur    Peripheral vascular disease (HCC)- (present on admission)  Assessment & Plan  -Followed by vascular surgery outpatient  -S/p revascularization of RLE about 1 month ago  -Seen in vascular office yesterday and told blood flow is doing well in RLE, but referred to ED for antibiotics and further evaluation  -Pulses heard on doppler exam in ED    Plan:  -Ordered bilateral lower extremity U/S with PATSY  -Started ASA 81 daily  -Continue home Atorvastatin 40      End stage renal disease on dialysis (HCC)- (present on admission)  Assessment & Plan  -On TTHS hemodialysis outpatient    Plan:  -Nephrology consulted from ED  -Plan for HD  tomorrow  -Continue home Phos-lo    Paroxysmal A-fib (HCC)- (present on admission)  Assessment & Plan  -In sinus rhythm on admission    Plan:  -Holding Eliquis in anticipation of possible procedure tomorrow    Secondary hyperparathyroidism of renal origin (HCC)- (present on admission)  Assessment & Plan  -Continue home Calcitriol    Diabetes mellitus type 2 in nonobese (HCC)- (present on admission)  Assessment & Plan  -Last A1c 04/2020: 5.8  -On Lantus 5 units at home    Plan:  -A1c ordered  -Holding home Lantus  -SSI  -Hypoglycemic protocol    Dyslipidemia- (present on admission)  Assessment & Plan  -Continue home Atorvastatin    Gastroesophageal reflux disease- (present on admission)  Assessment & Plan  -Continue home Protonix 40 mg     Essential hypertension- (present on admission)  Assessment & Plan  -Continue home Coreg 3.125 mg BID    CAD (coronary artery disease)- (present on admission)  Assessment & Plan  -s/p LAD stent on Plavix at home     Plan:  -Continuing home Atorvastatin  -Holding Plavix in anticipation of possible procedure tomorrow    Hyperthyroidism- (present on admission)  Assessment & Plan  -Continue home Methimazole  -Last TSH was 10.0 with normal FT4  -Will recheck TSH and FT4

## 2020-11-14 NOTE — PROGRESS NOTES
Pt seen and examined  Right foot pain 7/10  Discussed with nursing and LPS - oral pain meds added  Plan to continue abx with surgery planned on monday  Patient reports recent exertional chest pain over the past month - last episode was 2 days ago - that was the last time he exerted himself  Denies sob  HD pending today  Due to extensive history of CAD and multiple risk factors - will transfer to tele for monitoring, repeat trops and echocardiogram    See h/p from this am for further details

## 2020-11-14 NOTE — ASSESSMENT & PLAN NOTE
-History of right great toe wound for 2 months that has had acute exacerbation with increased pain for last 3 days with clear liquid drainage  -Followed by vascular surgery outpatient s/p revascularization of RLE about 1 month ago  -Seen in office with vascular yesterday that confirmed his vascular flow is good in RLE, but referred patient to ED  -Afebrile and HD stable.  -Leukocytosis, ESR >120, CRP 24.4.   -Normal lactic acid and CPK  -FXR: shows evidence of gas in the tissue, however negative for osteomyelitis, fracture, or dislocation  -Taken for amputation of R great toe on 11/16/2020  IV Vancomycin and IV Zosyn stopped on 11/17, exchanged for Cephalexin    Plan:  -Orthopedics following  -Holding home Eliquis and Plavix, Heparin for VTE ppx  -Pain control  -Abx:  Cephalexin 500mg Q daily (No manufactory recommendations for renal dosing on patient on hemodialysis, however some guidelines suggest 250-500mg every 12-24 hours as an adjustment)  -Blood cultures NGTD  -Wound care consult  -Will follow amputation pathology histo; no evidence of osteomyelitis, now treating for cellulitis with Cephalexin  -XR chest ordered on 11/18; pending results  -PT/OT for discharge recs

## 2020-11-14 NOTE — ASSESSMENT & PLAN NOTE
-Followed by vascular surgery outpatient  -S/p revascularization of RLE about 1 month ago  -Seen in vascular office yesterday and told blood flow is doing well in RLE, but referred to ED for antibiotics and further evaluation  -Pulses heard on doppler exam in ED    Plan:    -On ASA 81 daily  -Continue home Atorvastatin 40

## 2020-11-14 NOTE — ED PROVIDER NOTES
ED Provider Note    CHIEF COMPLAINT  Chief Complaint   Patient presents with   • Wound Check   • Sent by MD DEJESUS    Primary care provider: Patito Edgar M.D.  Means of arrival: POV  History obtained from: Patient and son  History limited by: Nothing    Luis Sepulveda is a 74 y.o. male who presents with concerns for worsening right toe wound.  The patient has a long history of peripheral vascular disease as well as diabetes.  He has been following with vascular surgery as well as orthopedics/foot surgery regarding worsening wound to his right toe.  This has been going on for months, he has had revascularization procedures with Dr. Encinas, and he also saw his orthopedic surgeon yesterday.  Apparently the wound has been getting worse, he is not currently on antibiotics, and he is complaining of a throbbing achy nonradiating pain to his right distal toe which has been black for some time but now having drainage and has more blackness and redness going up to his mid foot.  No falls or injuries or trauma.  Today he does have associated symptoms of some fevers and chills, anterior achy nonradiating chest pain.  The patient has a very complicated medical history including CHF, chronic kidney disease requiring dialysis 3 times per week due tomorrow, coronary artery disease status post stents, anticoagulated on Eliquis, and he is also diabetic.  No alleviated shin today from prescribed oxycodone from his vascular surgeon.  Aggravated by palpation and bearing weight.  When bearing weight his pain is severe to his right toe and midfoot.  No cough or known Covid contact.    REVIEW OF SYSTEMS  Constitutional: Positive for fever and chills.   HENT: Negative for rhinorrhea or sore throat.    Respiratory: Negative for cough or shortness of breath.    Cardiovascular: Positive for intermittent chest pain, negative for syncope or palpitations.  Gastrointestinal: Positive for nausea and episode of vomiting earlier  today, no abdominal pain.  Genitourinary: Negative for dysuria or flank pain.  Minimal urine output at baseline.  Musculoskeletal: Negative for back pain but positive for right foot and toe pain and redness.  Skin: Positive for blackness and drainage to right great toe, redness of the midfoot.  Neurological: Negative for sensory or motor changes.   See HPI for further details. All other systems are negative.     PAST MEDICAL HISTORY   has a past medical history of CAD (coronary artery disease), Chronic anticoagulation (3/26/2020), Chronic kidney disease (CKD), stage V (HCC), Congestive heart failure (HCC), Diabetes, Hypertension, Kidney failure, Osteoarthritis, and Peripheral neuropathy.    PAST FAMILY HISTORY  Family History   Problem Relation Age of Onset   • Heart Disease Mother    • Alcohol/Drug Father    • Thyroid Son    • Diabetes Brother    • Hypertension Neg Hx    • Hyperlipidemia Neg Hx        SOCIAL HISTORY  Social History     Tobacco Use   • Smoking status: Former Smoker     Packs/day: 1.00     Years: 55.00     Pack years: 55.00     Types: Cigarettes     Quit date: 2014     Years since quittin.8   • Smokeless tobacco: Never Used   Substance and Sexual Activity   • Alcohol use: No   • Drug use: No   • Sexual activity: Not on file       SURGICAL HISTORY   has a past surgical history that includes appendectomy; other orthopedic surgery; other cardiac surgery; toe amputation (Left, 2020); and tenotomy (Left, 2020).    CURRENT MEDICATIONS  Home Medications     Reviewed by Naun Ordaz R.N. (Registered Nurse) on 20 at 0507  Med List Status: <None>   Medication Last Dose Status   apixaban (ELIQUIS) 2.5mg Tab 2020 Active   atorvastatin (LIPITOR) 40 MG Tab 2020 Active   calcitRIOL (ROCALTROL) 0.25 MCG Cap 2020 Active   calcium acetate (PHOS-LO) 667 MG Cap 2020 Active   calcium acetate (PHOS-LO) 667 MG Tab tablet  Active   carvedilol (COREG) 3.125 MG Tab  "11/12/2020 Active   clopidogrel (PLAVIX) 75 MG Tab 11/12/2020 Active   insulin glargine (LANTUS SOLOSTAR) 100 UNIT/ML Solution Pen-injector injection 11/12/2020 Active   methimazole (TAPAZOLE) 5 MG Tab 11/12/2020 Active   ondansetron (ZOFRAN ODT) 4 MG TABLET DISPERSIBLE 11/10/2020 Active   ondansetron (ZOFRAN) 4 MG Tab tablet 11/10/2020 Active   pantoprazole (PROTONIX) 40 MG Tablet Delayed Response 11/12/2020 Active                ALLERGIES  Allergies   Allergen Reactions   • Mircera [Methoxy Polyethylene Glycol-Epoetin Beta] Hives       PHYSICAL EXAM  VITAL SIGNS: /67   Pulse 86   Temp 36.5 °C (97.7 °F) (Temporal)   Resp 17   Ht 1.676 m (5' 6\")   Wt 82.9 kg (182 lb 12.2 oz)   SpO2 98%   BMI 29.50 kg/m²    Pulse ox interpretation: On room air I interpret this pulse ox as normal.  Constitutional: Chronically ill-appearing sitting up in mild distress.  HEENT: Normocephalic, atraumatic. Posterior pharynx clear, mucous membranes dry.  Eyes:  EOMI. Normal sclerae.  Pale conjunctivae.  Neck: Supple, nontender.  Chest/Pulmonary: Slightly diminished to ausculation bilaterally, no wheezes or rhonchi.  Right chest wall central venous catheter site clean dry and intact.  Cardiovascular: Regular rate and rhythm, subtle systolic murmur.   Abdomen: Soft, nontender; no rebound, guarding, or masses.  Back: No CVA or midline tenderness.   Musculoskeletal: No deformity, trace symmetric lower extremity edema, the right great toe is completely black with some serous drainage and redness going up the midfoot.  Neuro: Clear speech, normal coordination, cranial nerves II-XII grossly intact, no focal asymmetry or sensory deficits.   Psych: Normal mood and affect.  Skin: Gangrenous changes to the right toe as above with mild  redness of the midfoot, skin otherwise pale, warm, dry.      DIAGNOSTIC STUDIES / PROCEDURES    LABS & EKG  Results for orders placed or performed during the hospital encounter of 11/13/20   CBC WITH " DIFFERENTIAL   Result Value Ref Range    WBC 15.2 (H) 4.8 - 10.8 K/uL    RBC 2.52 (L) 4.70 - 6.10 M/uL    Hemoglobin 8.5 (L) 14.0 - 18.0 g/dL    Hematocrit 25.7 (L) 42.0 - 52.0 %    .0 (H) 81.4 - 97.8 fL    MCH 33.7 (H) 27.0 - 33.0 pg    MCHC 33.1 (L) 33.7 - 35.3 g/dL    RDW 51.8 (H) 35.9 - 50.0 fL    Platelet Count 402 164 - 446 K/uL    MPV 8.8 (L) 9.0 - 12.9 fL    Neutrophils-Polys 80.90 (H) 44.00 - 72.00 %    Lymphocytes 9.80 (L) 22.00 - 41.00 %    Monocytes 8.10 0.00 - 13.40 %    Eosinophils 0.10 0.00 - 6.90 %    Basophils 0.30 0.00 - 1.80 %    Immature Granulocytes 0.80 0.00 - 0.90 %    Nucleated RBC 0.00 /100 WBC    Neutrophils (Absolute) 12.32 (H) 1.82 - 7.42 K/uL    Lymphs (Absolute) 1.49 1.00 - 4.80 K/uL    Monos (Absolute) 1.23 (H) 0.00 - 0.85 K/uL    Eos (Absolute) 0.01 0.00 - 0.51 K/uL    Baso (Absolute) 0.05 0.00 - 0.12 K/uL    Immature Granulocytes (abs) 0.12 (H) 0.00 - 0.11 K/uL    NRBC (Absolute) 0.00 K/uL   COMP METABOLIC PANEL   Result Value Ref Range    Sodium 134 (L) 135 - 145 mmol/L    Potassium 4.4 3.6 - 5.5 mmol/L    Chloride 91 (L) 96 - 112 mmol/L    Co2 27 20 - 33 mmol/L    Anion Gap 16.0 7.0 - 16.0    Glucose 158 (H) 65 - 99 mg/dL    Bun 48 (H) 8 - 22 mg/dL    Creatinine 6.31 (HH) 0.50 - 1.40 mg/dL    Calcium 9.7 8.5 - 10.5 mg/dL    AST(SGOT) 11 (L) 12 - 45 U/L    ALT(SGPT) 12 2 - 50 U/L    Alkaline Phosphatase 88 30 - 99 U/L    Total Bilirubin 0.4 0.1 - 1.5 mg/dL    Albumin 3.7 3.2 - 4.9 g/dL    Total Protein 7.6 6.0 - 8.2 g/dL    Globulin 3.9 (H) 1.9 - 3.5 g/dL    A-G Ratio 0.9 g/dL   LACTIC ACID   Result Value Ref Range    Lactic Acid 1.6 0.5 - 2.0 mmol/L   LACTIC ACID   Result Value Ref Range    Lactic Acid 1.8 0.5 - 2.0 mmol/L   LACTIC ACID   Result Value Ref Range    Lactic Acid 1.3 0.5 - 2.0 mmol/L   MAGNESIUM   Result Value Ref Range    Magnesium 2.0 1.5 - 2.5 mg/dL   TROPONIN   Result Value Ref Range    Troponin T 142 (H) 6 - 19 ng/L   COVID/SARS CoV-2 PCR    Specimen:  Nasopharyngeal; Respirate   Result Value Ref Range    COVID Order Status Received    Sed Rate   Result Value Ref Range    Sed Rate Westergren >120 (H) 0 - 20 mm/hour   CRP QUANTITIVE (NON-CARDIAC)   Result Value Ref Range    Stat C-Reactive Protein 24.11 (H) 0.00 - 0.75 mg/dL   CREATINE KINASE   Result Value Ref Range    CPK Total 81 0 - 154 U/L   CoV-2, Flu A/B, And RSV by PCR   Result Value Ref Range    Influenza virus A RNA Negative Negative    Influenza virus B, PCR Negative Negative    RSV, PCR Negative Negative    SARS-CoV-2 by PCR NotDetected     SARS-CoV-2 Source NP Swab    TROPONIN   Result Value Ref Range    Troponin T 131 (H) 6 - 19 ng/L   ESTIMATED GFR   Result Value Ref Range    GFR If African American 10 (A) >60 mL/min/1.73 m 2    GFR If Non African American 9 (A) >60 mL/min/1.73 m 2   Lipid Profile   Result Value Ref Range    Cholesterol,Tot 149 100 - 199 mg/dL    Triglycerides 213 (H) 0 - 149 mg/dL    HDL 21 (A) >=40 mg/dL    LDL 85 <100 mg/dL   TSH WITH REFLEX TO FT4   Result Value Ref Range    TSH 0.154 (L) 0.380 - 5.330 uIU/mL   FREE THYROXINE   Result Value Ref Range    Free T-4 1.77 (H) 0.93 - 1.70 ng/dL   Potassium Serum (K)   Result Value Ref Range    Potassium 4.3 3.6 - 5.5 mmol/L   ACCU-CHEK GLUCOSE   Result Value Ref Range    Glucose - Accu-Ck 123 (H) 65 - 99 mg/dL   EKG   Result Value Ref Range    Report       [facility] Emergency Dept.    Test Date:  2020  Pt Name:    KLARISSA BRADSHAW              Department: ER  MRN:        [id number]                      Room:       [facility]  Gender:     Male                         Technician: GOLDEN  :        1945                   Requested By:CLARISSE RIVERA  Order #:    [id number]                    Reading MD: Clarisse Rivera MD    Measurements  Intervals                                Axis  Rate:       93                           P:          66  CA:         180                          QRS:        -60  QRSD:       138                           T:          42  QT:         416  QTc:        518    Interpretive Statements  SINUS RHYTHM  RBBB AND LAFB  Compared to ECG 06/18/2020 10:56:18  No significant changes  Stable EKG no STEMI  Electronically Signed On 11- 1:25:57 PST by Arben Kilgore MD         RADIOLOGY  DX-CHEST-PORTABLE (1 VIEW)    (Results Pending)   DX-FOOT-COMPLETE 3+ RIGHT    (Results Pending)   US-EXTREMITY ARTERY LOWER BILAT W/PATSY (COMBO)    (Results Pending)       COURSE & MEDICAL DECISION MAKING    This is a 74 y.o. male who presents with concerns for worsening right toe wound.  History of vascular disease as well as diabetes and osteomyelitis.    Differential Diagnosis includes but is not limited to:  Osteomyelitis, gangrene, sepsis, vascular disease    ED Course:  This is an unfortunate, medically complex 74-year-old male coming in with worsening right toe skin changes and pain, redness now going up the midfoot.  High suspicion for gangrene versus osteomyelitis and surrounding secondary cellulitis.  Plan septic work-up, IV antibiotics, I will keep the patient n.p.o. until surgical process is ruled out, 30 cc/kg crystalloid fluid bolus ordered per sepsis protocol.  IV pain medicine for symptom relief.    White count elevated, the patient has multiple sirs criteria I think this is sepsis secondary to diabetic foot infection versus osteo-.  Thankfully lactic acid level is normal.    11:10 PM - Dr. Kerns from the patient's orthopedic surgery team was consulted by phone, agrees with plan for admission to medicine and medical stabilization and IV antibiotics and that he will round on the patient in the morning for probable urgent surgical intervention.    11:22 PM - Consulted by phone with Dr. Hunter, nephrology, who follows the patient on an outpatient basis his team will coordinate dialysis in the morning.    11:26 PM - Critically elevated troponin of 142 call received from lab and taken by myself.  Patient  currently chest pain-free receiving aspirin, likely secondary strain, EKG shows no obvious STEMI or strain or dysrhythmia.  Plan to trend.    Radiology system unfortunately down but paper printout shows fairly stable chest x-ray, possible mild edema.  Rapid Covid testing negative.  WBC elevated concerning for sepsis given sirs bacteria.  Likely skin/soft tissue/foot as source.  Unasyn and vancomycin per protocol.  Preliminary radiology report does show some gas in the foot, but looking at the patient's wound care records and media imaging the wound appears fairly stable from November 4 of this year.  As such, after several hours of observation I see no rapid expansion of this wound there is no crepitus highly doubt necrotizing fasciitis.  Orthopedics will see the patient in the morning, hospitalist/internal medicine team consulted they will kindly admit for further work-up and treatment we will keep the patient n.p.o. in case he does require surgery tomorrow.  Troponin downtrending, heart score is 5 but no active chest pain aspirin has been given.  No acute chest pain at this time highly doubt ACS likely type II strain.  Patient hemodynamically stable for admission in guarded condition.  30 cc/kg fluid bolus ordered but given lactic acid level normal and history of cardiomyopathy/CHF and some mild congestion on chest x-ray will stop after 1 L crystalloid resuscitation vitals remained stable.      Medications   senna-docusate (PERICOLACE or SENOKOT S) 8.6-50 MG per tablet 2 Tab (0 Tabs Oral Held 11/14/20 0600)     And   polyethylene glycol/lytes (MIRALAX) PACKET 1 Packet (has no administration in time range)     And   magnesium hydroxide (MILK OF MAGNESIA) suspension 30 mL (has no administration in time range)     And   bisacodyl (DULCOLAX) suppository 10 mg (has no administration in time range)   acetaminophen (Tylenol) tablet 650 mg (650 mg Oral Given 11/14/20 0845)   MD Alert...Vancomycin per Pharmacy (has no  administration in time range)   piperacillin-tazobactam (ZOSYN) 4.5 g in  mL IVPB (0 g Intravenous Stopped 11/14/20 0311)     And   piperacillin-tazobactam (ZOSYN) 4.5 g in  mL IVPB (0 g Intravenous Stopped 11/14/20 0913)   morphine (pf) 4 mg/mL injection 4 mg ( Intravenous Canceled Entry 11/14/20 0839)   atorvastatin (LIPITOR) tablet 40 mg (40 mg Oral Missed 11/14/20 0230)   calcitRIOL (ROCALTROL) capsule 0.25 mcg (0.25 mcg Oral Refused 11/14/20 0600)   calcium acetate (PHOS-LO) 667 MG tablet 667 mg (667 mg Oral Refused 11/14/20 0700)   carvedilol (COREG) tablet 3.125 mg (3.125 mg Oral Given 11/14/20 0840)   aspirin (ASA) chewable tab 81 mg (0 mg Oral Held 11/14/20 0600)   insulin regular (HumuLIN R,NovoLIN R) injection (0 Units Subcutaneous Dose not Required 11/14/20 0800)     And   glucose 4 g chewable tablet 16 g (has no administration in time range)     And   dextrose 50% (D50W) injection 50 mL (has no administration in time range)   omeprazole (PRILOSEC) capsule 20 mg (0 mg Oral Held 11/14/20 0600)   methimazole (TAPAZOLE) tablet 7.5 mg (0 mg Oral Held 11/14/20 0600)     And   methimazole (TAPAZOLE) tablet 5 mg (has no administration in time range)   lidocaine (XYLOCAINE) 1 % injection 1 mL (has no administration in time range)   epoetin (Retacrit) injection (Dialysis use only) 10,000 Units (has no administration in time range)   oxyCODONE immediate-release (ROXICODONE) tablet 5 mg (has no administration in time range)     Or   oxyCODONE immediate release (ROXICODONE) tablet 10 mg (has no administration in time range)   NS infusion 2,400 mL (0 mL/kg × 80 kg Intravenous Stopped 11/14/20 0013)   HYDROmorphone pf (DILAUDID) injection 0.5 mg (0.5 mg Intravenous Given 11/13/20 2243)   ampicillin/sulbactam (UNASYN) 3 g in  mL IVPB (0 g Intravenous Stopped 11/13/20 2314)   aspirin (ASA) chewable tab 324 mg (324 mg Oral Given 11/13/20 2212)   vancomycin (VANCOCIN) 2,000 mg in  mL IVPB (0 mg  Intravenous Stopped 11/14/20 0555)   HYDROmorphone pf (DILAUDID) injection 0.5 mg (0.5 mg Intravenous Given 11/14/20 0013)       FINAL IMPRESSION  1. Sepsis without acute organ dysfunction, due to unspecified organism (HCC)    2. Gangrene of toe (HCC)    3. Diabetic foot infection (HCC)    4. NSTEMI (non-ST elevated myocardial infarction) (HCC)    5. Elevated troponin    6. Peripheral vascular disease (HCC)    7. End stage renal disease on dialysis (HCC)    8. Cardiomyopathy, ischemic    9. Presence of stent in LAD coronary artery    10. Essential hypertension    11. Chest pain, unspecified type    12. Anemia of chronic disease          -ADMIT-      Pertinent Labs & Imaging studies reviewed and verified by myself, as well as nursing notes and the patient's past medical, family, and social histories (See chart for details).    Portions of this record were made with voice recognition software.  Despite my review, spelling/grammar/context errors may still remain.  Interpretation of this chart should be taken in this context.    Electronically signed by Arben Kilgore M.D. on 11/14/2020 at 10:50 AM.

## 2020-11-14 NOTE — PROGRESS NOTES
"Pharmacy Kinetics 74 y.o. male on vancomycin day # 1  2020    Has had Vancomycin 2000 mg iv x 1 dose at 0014 on   Provider specified end date: TBD    Indication for Treatment: SSTI    Pertinent history per medical record: Admitted on 2020 for diabetic foot infection (right toe) with plan of amputation Monday. Hx of T2DM, ESRD.    Other antibiotics: Zosyn 4.5 iv q 12 hrs    Allergies: Mircera [methoxy polyethylene glycol-epoetin beta]     List concerns for renal function: age, ESRD    Pertinent cultures to date:   Blood cultures in process    MRSA nares swab if pneumonia is a concern (ordered/positive/negative/n-a): n/a    Recent Labs     20  2228   WBC 15.2*   NEUTSPOLYS 80.90*     Recent Labs     20  2228   BUN 48*   CREATININE 6.31*   ALBUMIN 3.7     Recent Labs     20  1249   VANCOTROUGH 15.3       Intake/Output Summary (Last 24 hours) at 2020 1501  Last data filed at 2020 0843  Gross per 24 hour   Intake 1000 ml   Output 175 ml   Net 825 ml      /67   Pulse 86   Temp 36.5 °C (97.7 °F) (Temporal)   Resp 17   Ht 1.676 m (5' 6\")   Wt 82.9 kg (182 lb 12.2 oz)   SpO2 98%  Temp (24hrs), Av.9 °C (98.4 °F), Min:36.5 °C (97.7 °F), Max:37.4 °C (99.3 °F)      A/P   1. Vancomycin dose change: Pt will be pulse dosed with vanco  2. Next vancomycin level: was today = 15.3 mcg/ml  3. Goal trough: 10-15 mcg/ml  4. Comments: Vanco level drawn ~12 hours after vanco loading dose and is at upper end of goal range. Will give vanco 15 mg/kg (1250 mg) iv x 1 dose later tonight and check a vanco level in 1-2 days. Pt to have surgery Monday(?).    Edson Leyva, PharmD    "

## 2020-11-14 NOTE — ED NOTES
Telephone report given to receiving RN, JONG. Patient to floor via transport. Patient in stable condition, all belongings including clothes, slippers, and bag with patient.

## 2020-11-14 NOTE — ASSESSMENT & PLAN NOTE
-History of intermittent chest pain, non-radiating, intermittent, self resolving, lasting about 20-30 seconds  -EKG sinus rhythm with RBBB and LAFB, no ST changes concerning for ischemia or infarct  -No active chest pain at this time  -Troponin 142 --> 131  -EKG and stat troponin PRN if chest pain is to recur

## 2020-11-14 NOTE — ED NOTES
PIV x2 placed, labs, blood cultures x 2, COVID swab collected and sent. IVF and IV abx infusing. Xray at bedside.

## 2020-11-14 NOTE — ASSESSMENT & PLAN NOTE
-On [facility] hemodialysis outpatient  -Last Dialysis session on 11/17  -Nephrology following  -Continue home Phos-lo for renal osteodystrophy

## 2020-11-14 NOTE — PROGRESS NOTES
Gave report to tele 8 RN at bedside. Patient taken to dialysis before transfer to tele room 805. Patient respirations are unlabored and regular, patient connected to tele monitory. Belongings sent with patient.

## 2020-11-14 NOTE — CARE PLAN
Problem: Safety  Goal: Will remain free from injury  Outcome: PROGRESSING AS EXPECTED  Patient calls appropriately.      Problem: Knowledge Deficit  Goal: Knowledge of disease process/condition, treatment plan, diagnostic tests, and medications will improve  Outcome: PROGRESSING AS EXPECTED  Patient aware of POC.  All questions answered.

## 2020-11-14 NOTE — WOUND TEAM
LIMB PRESERVATION SERVICE - RN NOTE        Reason for LPS Consultation: R great toe, L plantar foot      History of Present Illness: Patient is a 74 y.o. male with past medical history that includes  diabetes,  L great toe amputation with tenotomy , PVD, CKD on dialysis, CHF, CAD with stents and anticoagulation, admitted 11/13/2020 for Diabetic foot infection (HCC).  An LPS consult has been requested for evaluation of R great toe and L plantar foot callus.  This wound started about 10/14/2020.  He has been treating the wound with betadine.      Patient was diagnosed with diabetes approximately 1990s, and currently manages with 5 units of insulin.   reports that these average 120s. Has had diabetes education before.   Has had any previous foot surgeries.  does not have orthotics but has an Rx and will follow up after surgery.  Current occupation retired.     Pt made NPO and vancomycin and Zosyn initiated via IV    Past medical history, medicines, allergies, family history, social history,    reviewed    Followed by:    Dr Mario Encinas - vascular  4/24/2020    Intervention(s)  Left anterior tibial artery angioplasty  Percutaneous thrombectomy of left peroneal artery  Left peroneal artery angioplasty    Dr Leobardo Lynn - orthopedic  5/14/2020   Intervention(s)  Left anterior tibial artery angioplasty  Percutaneous thrombectomy of left peroneal artery  Left peroneal artery angioplasty    DIAGNOSTICS this admission    Xray:    11/13/2020 ordered in ED     MRI: not yet ordered             CT: not ordered             Vascular: New study ordered 5/13/2020 4/22/2020 FINDINGS   Right.    Calcific plaque is seen throughout the right lower extremity. Plaque is    especially dense in the tibial and peroneal arteries.   Unable to visualize the right peroneal artery due to calcific shadowing    from the posterior and anterior tibial arteries.    No focal stenosis is seen.    Waveforms are monophasic at the distal anterior  and posterior tibial    arteries.       Left.    Calcific plaque is seen throughout the right lower extremity. Plaque is    especially dense in the tibial and peroneal arteries.   Unable to visualize the right peroneal artery due to calcific shadowing    from the posterior and anterior tibial arteries.    The posterior tibial artery appears to be occluded throughout.    There are multiple tandem stenosis seen in the proximal anterior tibial    artery. All stenotic velocities are consistent with > 75%stenosis.    Stenosis of the distal anterior tibial artery. Velocities are consistent    with > 75%stenosis.    Waveforms at the distal anterior tibial artery are monophasic.                   Labs                                      COVID-19: 11/13/2020 negative          ESR:   Results from last 7 days   Lab Units 11/13/20  2228   SED RATE WESTERGREN 1526 mm/hour >120*                         CRP:   Results from last 7 days   Lab Units 11/13/20  2228   C REACTIVE PROTEIN 4596 mg/dL 24.11*              A1c:   Lab Results   Component Value Date/Time    HBA1C 5.8 (H) 04/01/2020 09:34 AM        PHYSICAL EXAMINATION:   Vascular Assessment: PATSY ordered in ED    Sensory Assessment  Monofilament testing  Wound Assessment:  Right great toe with large dry black eschar with surrounding erythema that has not receded yet from sharpie line. There is edema of the toe and forefoot with peeling skin layers.  10/10 pain  Left foot has a thick nickel size callus on the plantar 1st MTH that is discolored without drainage and without erythema.    Both feet are dry, nails show onychomycosis.                        Procedures:     Periwound callus, L plantar 1.5 cm2, debrided to skin level, removing hyperkeratinized tissue and left ALHAJI   R great toe - orders written for RN to paint with betadine daily.        Patient Education:  POC              PLAN:    Wound care: R great to ALHAJI, paint with betadine, observe closely for worsening s&s.  L  plantar foot ALHAJI    Labs/Imaging:  COVID-19: negative    Vascular status: awaiting PATSY    Surgery: to be seen     Antibiotics: IV vancomycin and Zoxyn    Weight Bearing Status: pt presently self limiting due to pain    Offloading: Pt has Rx for diabetic foot wear.  Will likely need some off loading insert following surgery    PT/OT Consult: not yet    Diabetes Education:           DISCHARGE PLAN:    Disposition: tbd    Follow-up: tbd following surgery.       Professional collaboration: Dr Sukh Aguero aware that pt has been admitted.  His plan is to take pt to surgery Monday 11/16 unless pt displays worsening s&s.  Dr Whit Cline aware of surgical plan and informs LPS that pt may transfer to tele floor.

## 2020-11-14 NOTE — CONSULTS
[facility] -  CONSULTATION NOTE               Author: Mario Hunter M.D. Date & Time: 11/14/2020  8:32 AM       REASON FOR CONSULTATION:   Inpatient hemodialysis management.    CHIEF COMPLAINT:   Toe pain    HISTORY OF PRESENT ILLNESS:    73 yo M PMH ESRD TTS iHD, HTN, type 2 DM, anemia of CKD, and CKD-MBD, CAD s/p stents, IDDM, A. fib, hypothyroidism and GERD who [facility]. Has undelrying severe and s/p L great toe amputation in may 2020. Followed by vascular surgery outpatient with a repeat balloon angioplasty about 1 month ago. For last several days worse right toe pain with subjective fevers. Last HD on Thursday. No issues with access of electrolytes. In the ED was noted to have sepsis from gangrene toe promting admission.  No chest pain or shortness of breath.  Without fever, chills, sweats    REVIEW OF SYSTEMS:    General: No malaise  CV: No chest pain  RESP: No shortness of breath  GI: No abdominal pain   : No dysuria or gross hematuria  MSK: +toe pain  Skin: No rashes  Neuro: No tremors  Psych: No depression    PAST MEDICAL HISTORY:   Past Medical History:   Diagnosis Date   • CAD (coronary artery disease)     stents   • Chronic anticoagulation 3/26/2020   • Chronic kidney disease (CKD), stage V (HCC)    • Congestive heart failure (HCC)    • Diabetes    • Hypertension    • Kidney failure    • Osteoarthritis    • Peripheral neuropathy          PAST SURGICAL HISTORY:   Past Surgical History:   Procedure Laterality Date   • TOE AMPUTATION Left 5/17/2020    Procedure: AMPUTATION, TOE GREAT;  Surgeon: Leobardo Lynn M.D.;  Location: SURGERY [facility];  Service: Orthopedics   • TENOTOMY Left 5/17/2020    Procedure: FLEXOR TENOTOMIES, TWO-FIVE TOES;  Surgeon: Leobardo Lynn M.D.;  Location: SURGERY [facility];  Service: Orthopedics   • APPENDECTOMY     • OTHER CARDIAC SURGERY      stents x1   • OTHER ORTHOPEDIC SURGERY       "knee surgery       FAMILY HISTORY:   No family history of kidney disease    SOCIAL HISTORY:   No smoking, no etoh, no illicit drugs.    HOME MEDICATIONS:   Reviewed and documented in chart    ALLERGIES:  Mircera [methoxy polyethylene glycol-epoetin beta]    PHYSICAL EXAM:  VS:  /67   Pulse 86   Temp 36.5 °C (97.7 °F) (Temporal)   Resp 17   Ht 1.676 m (5' 6\")   Wt 82.9 kg (182 lb 12.2 oz)   SpO2 98%   BMI 29.50 kg/m²   GENERAL: no acute distress  CV: RRR, No edema  RESP: Clear to ausculation bilaterally, No rhales  GI: Soft  MSK: No joint deformities   SKIN: No concerning rashes  NEURO: AOx3  PSYCH: Cooperative    LABS:  Recent Results (from the past 24 hour(s))   TSH WITH REFLEX TO FT4    Collection Time: 11/13/20  8:48 PM   Result Value Ref Range    TSH 0.154 (L) 0.380 - 5.330 uIU/mL   FREE THYROXINE    Collection Time: 11/13/20  8:48 PM   Result Value Ref Range    Free T-4 1.77 (H) 0.93 - 1.70 ng/dL   CBC WITH DIFFERENTIAL    Collection Time: 11/13/20 10:28 PM   Result Value Ref Range    WBC 15.2 (H) 4.8 - 10.8 K/uL    RBC 2.52 (L) 4.70 - 6.10 M/uL    Hemoglobin 8.5 (L) 14.0 - 18.0 g/dL    Hematocrit 25.7 (L) 42.0 - 52.0 %    .0 (H) 81.4 - 97.8 fL    MCH 33.7 (H) 27.0 - 33.0 pg    MCHC 33.1 (L) 33.7 - 35.3 g/dL    RDW 51.8 (H) 35.9 - 50.0 fL    Platelet Count 402 164 - 446 K/uL    MPV 8.8 (L) 9.0 - 12.9 fL    Neutrophils-Polys 80.90 (H) 44.00 - 72.00 %    Lymphocytes 9.80 (L) 22.00 - 41.00 %    Monocytes 8.10 0.00 - 13.40 %    Eosinophils 0.10 0.00 - 6.90 %    Basophils 0.30 0.00 - 1.80 %    Immature Granulocytes 0.80 0.00 - 0.90 %    Nucleated RBC 0.00 /100 WBC    Neutrophils (Absolute) 12.32 (H) 1.82 - 7.42 K/uL    Lymphs (Absolute) 1.49 1.00 - 4.80 K/uL    Monos (Absolute) 1.23 (H) 0.00 - 0.85 K/uL    Eos (Absolute) 0.01 0.00 - 0.51 K/uL    Baso (Absolute) 0.05 0.00 - 0.12 K/uL    Immature Granulocytes (abs) 0.12 (H) 0.00 - 0.11 K/uL    NRBC (Absolute) 0.00 K/uL   COMP METABOLIC PANEL    " Collection Time: 11/13/20 10:28 PM   Result Value Ref Range    Sodium 134 (L) 135 - 145 mmol/L    Potassium 4.4 3.6 - 5.5 mmol/L    Chloride 91 (L) 96 - 112 mmol/L    Co2 27 20 - 33 mmol/L    Anion Gap 16.0 7.0 - 16.0    Glucose 158 (H) 65 - 99 mg/dL    Bun 48 (H) 8 - 22 mg/dL    Creatinine 6.31 (HH) 0.50 - 1.40 mg/dL    Calcium 9.7 8.5 - 10.5 mg/dL    AST(SGOT) 11 (L) 12 - 45 U/L    ALT(SGPT) 12 2 - 50 U/L    Alkaline Phosphatase 88 30 - 99 U/L    Total Bilirubin 0.4 0.1 - 1.5 mg/dL    Albumin 3.7 3.2 - 4.9 g/dL    Total Protein 7.6 6.0 - 8.2 g/dL    Globulin 3.9 (H) 1.9 - 3.5 g/dL    A-G Ratio 0.9 g/dL   LACTIC ACID    Collection Time: 11/13/20 10:28 PM   Result Value Ref Range    Lactic Acid 1.6 0.5 - 2.0 mmol/L   MAGNESIUM    Collection Time: 11/13/20 10:28 PM   Result Value Ref Range    Magnesium 2.0 1.5 - 2.5 mg/dL   TROPONIN    Collection Time: 11/13/20 10:28 PM   Result Value Ref Range    Troponin T 142 (H) 6 - 19 ng/L   Sed Rate    Collection Time: 11/13/20 10:28 PM   Result Value Ref Range    Sed Rate Westergren >120 (H) 0 - 20 mm/hour   CRP QUANTITIVE (NON-CARDIAC)    Collection Time: 11/13/20 10:28 PM   Result Value Ref Range    Stat C-Reactive Protein 24.11 (H) 0.00 - 0.75 mg/dL   CREATINE KINASE    Collection Time: 11/13/20 10:28 PM   Result Value Ref Range    CPK Total 81 0 - 154 U/L   ESTIMATED GFR    Collection Time: 11/13/20 10:28 PM   Result Value Ref Range    GFR If African American 10 (A) >60 mL/min/1.73 m 2    GFR If Non African American 9 (A) >60 mL/min/1.73 m 2   COVID/SARS CoV-2 PCR    Collection Time: 11/13/20 10:48 PM    Specimen: Nasopharyngeal; Respirate   Result Value Ref Range    COVID Order Status Received    CoV-2, Flu A/B, And RSV by PCR    Collection Time: 11/13/20 10:48 PM   Result Value Ref Range    Influenza virus A RNA Negative Negative    Influenza virus B, PCR Negative Negative    RSV, PCR Negative Negative    SARS-CoV-2 by PCR NotDetected     SARS-CoV-2 Source NP Swab    EKG     Collection Time: 20 11:21 PM   Result Value Ref Range    Report       [facility] Emergency Dept.    Test Date:  2020  Pt Name:    KLARISSA BRADSHAW              Department: ER  MRN:        [id number]                      Room:       [facility]  Gender:     Male                         Technician: GOLDEN  :        1945                   Requested By:ARBEN RIVERA  Order #:    [id number]                    Reading MD: Arben Rivera MD    Measurements  Intervals                                Axis  Rate:       93                           P:          66  MS:         180                          QRS:        -60  QRSD:       138                          T:          42  QT:         416  QTc:        518    Interpretive Statements  SINUS RHYTHM  RBBB AND LAFB  Compared to ECG 2020 10:56:18  No significant changes  Stable EKG no STEMI  Electronically Signed On 2020 1:25:57 PST by Arben Rivera MD     LACTIC ACID    Collection Time: 20 12:26 AM   Result Value Ref Range    Lactic Acid 1.8 0.5 - 2.0 mmol/L   TROPONIN    Collection Time: 20 12:26 AM   Result Value Ref Range    Troponin T 131 (H) 6 - 19 ng/L   Lipid Profile    Collection Time: 20 12:26 AM   Result Value Ref Range    Cholesterol,Tot 149 100 - 199 mg/dL    Triglycerides 213 (H) 0 - 149 mg/dL    HDL 21 (A) >=40 mg/dL    LDL 85 <100 mg/dL   LACTIC ACID    Collection Time: 20  2:39 AM   Result Value Ref Range    Lactic Acid 1.3 0.5 - 2.0 mmol/L   ACCU-CHEK GLUCOSE    Collection Time: 20  8:05 AM   Result Value Ref Range    Glucose - Accu-Ck 123 (H) 65 - 99 mg/dL       (click the triangle to expand results)    IMAGING:  DX-CHEST-PORTABLE (1 VIEW)    (Results Pending)   DX-FOOT-COMPLETE 3+ RIGHT    (Results Pending)   US-EXTREMITY ARTERY LOWER BILAT W/PATSY (COMBO)    (Results Pending)       ASSESSMENT:  # ESRD: TTS iHD @ [place] [facility]  # R. Foot Gangrene: Definitive therapy pending  # Sepsis:  Secondary to gangrene  # HTN: at goal  # Anemia of CKD: at goal  # CKD-MBD: Managed at HD unit  # HLD  # CAD     PLAN:  - HD today, continue qTTS dialysis schedule  -stop IV fluids  - Abx surgical intervention per primary/Ortho  -Dose meds for ESRD  - Continue home PO4 binders  -ARMANDO with HD    Thank you    Mario Hunter MD

## 2020-11-14 NOTE — ASSESSMENT & PLAN NOTE
-Last A1c 04/2020: 5.8 now 7.8  -Likely contributing to lower extremity toe gangrene  -On Lantus 5 units at home  -Holding home Lantus  -SSI  -Hypoglycemic protocol  -Outpatient follow up needed

## 2020-11-15 NOTE — PROGRESS NOTES
"[facility] -  PROGRESS NOTE               Author: Mario Hunter M.D. Date & Time: 11/15/2020  7:47 AM     HPI:  73 yo M PMH ESRD TTS iHD, HTN, type 2 DM, anemia of CKD, and CKD-MBD, CAD s/p stents, IDDM, A. fib, hypothyroidism and GERD [facility]. Has undelrying severe and s/p L great toe amputation in may 2020. Followed by vascular surgery outpatient with a repeat balloon angioplasty about 1 month ago. For last several days worse right toe pain with subjective fevers. Last HD on Thursday. No issues with access of electrolytes. In the ED was noted to have sepsis from gangrene toe promting admission.  No chest pain or shortness of breath.  Without fever, chills, sweats    DAILY NEPHROLOGY SUMMARY:  11/14: consult done  11/15: tolerated HD, pending R. Great toe amputation tomorrow     PAST FAMILY HISTORY: Reviewed and Unchanged  SOCIAL HISTORY: Reviewed and Unchanged  CURRENT MEDICATIONS: Reviewed  IMAGING STUDIES: Reviewed    ROS  General: No malaise  CV: No chest pain  RESP: No shortness of breath  GI: No abdominal pain   : No dysuria or gross hematuria  MSK: +toe pain  Skin: No rashes  Neuro: No tremors  Psych: No depression    PHYSICAL EXAM  VS:  /64   Pulse 91   Temp 37.9 °C (100.2 °F) (Temporal)   Resp 20   Ht 1.676 m (5' 6\")   Wt 81.4 kg (179 lb 7.3 oz)   SpO2 94%   BMI 28.96 kg/m²   GENERAL: no acute distress  CV: RRR, No edema  RESP: non-labored  GI: Soft  MSK: gangrene R. Great toe    SKIN: No concerning rashes  NEURO: AOx3  PSYCH: Cooperative    Fluids:  In: 740 [P.O.:240; Dialysis:500]  Out: 2675     LABS:  Recent Results (from the past 24 hour(s))   ACCU-CHEK GLUCOSE    Collection Time: 11/14/20  8:05 AM   Result Value Ref Range    Glucose - Accu-Ck 123 (H) 65 - 99 mg/dL   Potassium Serum (K)    Collection Time: 11/14/20  9:17 AM   Result Value Ref Range    Potassium 4.3 3.6 - 5.5 mmol/L   ACCU-CHEK GLUCOSE    " Collection Time: 11/14/20 11:37 AM   Result Value Ref Range    Glucose - Accu-Ck 107 (H) 65 - 99 mg/dL   VANCOMYCIN TROUGH LEVEL    Collection Time: 11/14/20 12:49 PM   Result Value Ref Range    Vancomycin Trough 15.3 10.0 - 20.0 ug/mL   TROPONIN    Collection Time: 11/14/20 12:49 PM   Result Value Ref Range    Troponin T 139 (H) 6 - 19 ng/L   ACCU-CHEK GLUCOSE    Collection Time: 11/14/20  4:35 PM   Result Value Ref Range    Glucose - Accu-Ck 88 65 - 99 mg/dL   ACCU-CHEK GLUCOSE    Collection Time: 11/14/20  8:07 PM   Result Value Ref Range    Glucose - Accu-Ck 139 (H) 65 - 99 mg/dL   EC-ECHOCARDIOGRAM COMPLETE W/O CONT    Collection Time: 11/14/20 10:07 PM   Result Value Ref Range    Eject.Frac. MOD BP 61.14     Eject.Frac. MOD 4C 63.26     Eject.Frac. MOD 2C 59.29     Left Ventrical Ejection Fraction 55    CBC with Differential    Collection Time: 11/15/20  2:38 AM   Result Value Ref Range    WBC 12.2 (H) 4.8 - 10.8 K/uL    RBC 2.29 (L) 4.70 - 6.10 M/uL    Hemoglobin 7.7 (L) 14.0 - 18.0 g/dL    Hematocrit 23.8 (L) 42.0 - 52.0 %    .9 (H) 81.4 - 97.8 fL    MCH 33.6 (H) 27.0 - 33.0 pg    MCHC 32.4 (L) 33.7 - 35.3 g/dL    RDW 53.1 (H) 35.9 - 50.0 fL    Platelet Count 356 164 - 446 K/uL    MPV 8.8 (L) 9.0 - 12.9 fL    Neutrophils-Polys 81.80 (H) 44.00 - 72.00 %    Lymphocytes 7.70 (L) 22.00 - 41.00 %    Monocytes 9.10 0.00 - 13.40 %    Eosinophils 0.00 0.00 - 6.90 %    Basophils 0.30 0.00 - 1.80 %    Immature Granulocytes 1.10 (H) 0.00 - 0.90 %    Nucleated RBC 0.00 /100 WBC    Neutrophils (Absolute) 9.95 (H) 1.82 - 7.42 K/uL    Lymphs (Absolute) 0.94 (L) 1.00 - 4.80 K/uL    Monos (Absolute) 1.11 (H) 0.00 - 0.85 K/uL    Eos (Absolute) 0.00 0.00 - 0.51 K/uL    Baso (Absolute) 0.04 0.00 - 0.12 K/uL    Immature Granulocytes (abs) 0.13 (H) 0.00 - 0.11 K/uL    NRBC (Absolute) 0.00 K/uL   Comp Metabolic Panel (CMP)    Collection Time: 11/15/20  2:38 AM   Result Value Ref Range    Sodium 134 (L) 135 - 145 mmol/L     Potassium 4.4 3.6 - 5.5 mmol/L    Chloride 94 (L) 96 - 112 mmol/L    Co2 29 20 - 33 mmol/L    Anion Gap 11.0 7.0 - 16.0    Glucose 125 (H) 65 - 99 mg/dL    Bun 25 (H) 8 - 22 mg/dL    Creatinine 4.06 (H) 0.50 - 1.40 mg/dL    Calcium 9.6 8.5 - 10.5 mg/dL    AST(SGOT) 10 (L) 12 - 45 U/L    ALT(SGPT) 5 2 - 50 U/L    Alkaline Phosphatase 74 30 - 99 U/L    Total Bilirubin 0.4 0.1 - 1.5 mg/dL    Albumin 3.4 3.2 - 4.9 g/dL    Total Protein 6.8 6.0 - 8.2 g/dL    Globulin 3.4 1.9 - 3.5 g/dL    A-G Ratio 1.0 g/dL   Renal Function Panel    Collection Time: 11/15/20  2:38 AM   Result Value Ref Range    Phosphorus 4.9 (H) 2.5 - 4.5 mg/dL   IRON/TOTAL IRON BIND    Collection Time: 11/15/20  2:38 AM   Result Value Ref Range    Iron 30 (L) 50 - 180 ug/dL    Total Iron Binding 159 (L) 250 - 450 ug/dL    Unsat Iron Binding 129 110 - 370 ug/dL    % Saturation 19 15 - 55 %   FERRITIN    Collection Time: 11/15/20  2:38 AM   Result Value Ref Range    Ferritin 1664.0 (H) 22.0 - 322.0 ng/mL   ESTIMATED GFR    Collection Time: 11/15/20  2:38 AM   Result Value Ref Range    GFR If  17 (A) >60 mL/min/1.73 m 2    GFR If Non  14 (A) >60 mL/min/1.73 m 2       (click the triangle to expand results)      ASSESSMENT:  # ESRD: TTS iHD @ [place] [facility]  # R. Foot Gangrene: Pending amputation tomorrow   # Sepsis: Secondary to gangrene  # HTN: at goal  # Anemia of CKD: off target, ferritin level precludes IV iron  # CKD-MBD: Managed at HD unit  # HLD  # CAD     PLAN:  -No HD today, continue qTTS dialysis schedule  -Abx surgical intervention per primary/Ortho  -Dose meds for ESRD  -Continue home PO4 binders  -ARMANDO with HD     Thank you     Mario Hunter MD

## 2020-11-15 NOTE — PROGRESS NOTES
Hemodialysis ordered by Dr. Hunter. Treatment started at 1151 and ended at 1521. Pt stable, vss, no c/o post tx. See flow sheets for details. Net UF 2.0 L. Reported to JULIEN Fernandez RN.

## 2020-11-15 NOTE — PROGRESS NOTES
"Pharmacy Kinetics 74 y.o. male on vancomycin day # 2  11/15/2020    Currently Dose: Vancomycin pulse dosing  11/14/20 10694 mg   11/14/20 VR 15.3 mcg/mL    Indication for Treatment: SSTI  Provider Specified End Date: 11/19/20  ID Service Following: No    Pertinent history per medical record: Admitted on 11/13/2020 for concern of SSTI of RLE in setting of DM. Orthopedics consulted with plans for surgical intervention.     Other antibiotics: piperacillin/tazobactam 4.5 gm iv q12h    Allergies: Mircera [methoxy polyethylene glycol-epoetin beta]     List concerns for accumulation of vancomycin: ESRD iHD TTS    Pertinent cultures to date:   Results     Procedure Component Value Units Date/Time    BLOOD CULTURE [[id number]] Collected: 11/13/20 2240    Order Status: Completed Specimen: Blood from Peripheral Updated: 11/15/20 0725     Significant Indicator NEG     Source BLD     Site PERIPHERAL     Culture Result No Growth  Note: Blood cultures are incubated for 5 days and  are monitored continuously.Positive blood cultures  are called to the RN and reported as soon as  they are identified.      Narrative:      Droplet, Contact, and Eye Protection  2 of 2 blood culture x2  Sites order. Per Hospital Policy:  Only change Specimen Src: to \"Line\" if specified by physician  order.  Right Wrist    BLOOD CULTURE [[id number]] Collected: 11/13/20 2228    Order Status: Completed Specimen: Blood from Peripheral Updated: 11/15/20 0725     Significant Indicator NEG     Source BLD     Site PERIPHERAL     Culture Result No Growth  Note: Blood cultures are incubated for 5 days and  are monitored continuously.Positive blood cultures  are called to the RN and reported as soon as  they are identified.      Narrative:      Droplet, Contact, and Eye Protection  1 of 2 for Blood Culture x 2 sites order. Per Hospital  Policy: Only change Specimen Src: to \"Line\" if specified by  physician order.  Right Forearm/Arm    CoV-2, Flu A/B, And RSV by PCR " "[[id number]] Collected: 11/13/20 2248    Order Status: Completed Updated: 11/14/20 0028     Influenza virus A RNA Negative     Influenza virus B, PCR Negative     RSV, PCR Negative     SARS-CoV-2 by PCR NotDetected     Comment: PATIENTS: Important information regarding your results and instructions can  be found at [email or web address]   \"After your  Covid-19 Test\"  [facility] providers: PLEASE REFER TO DE-ESCALATION AND RETESTING PROTOCOL  on [email or web address]  **The [employer] GeneXpert Xpress SARS-CoV-2 Test has been made available for  use under the Emergency Use Authorization (EUA) only.          SARS-CoV-2 Source NP Swab    Narrative:      Droplet, Contact, and Eye Protection  Rule-out COVID-19 panel, includes droplet/contact/eye  isolation order.  Check influenza to order this test only if  specifically indicated.  Is patient being admitted?->Yes  Does this patient meet criteria for Rush/Cepheid per [facility]  Inpatient Workflow? (See workflow link below)->Yes  Expected turn around time?->Rush (Cepheid 2-4 hours)  Is this the patients First SARS CoV-2 test?->No  Is this patient employed in healthcare?->No  Is the patient symptomatic as defined by the CDC?->Yes  Date of symptom onset?->11/12/20  Is the patient hospitalized?->No  Is the patient a resident in a congregate care setting?->No  Is the patient pregnant?->No    COVID/SARS CoV-2 PCR [[id number]] Collected: 11/13/20 2248    Order Status: Completed Specimen: Respirate from Nasopharyngeal Updated: 11/13/20 2301     COVID Order Status Received     Comment: The order for SARS CoV-2 testing has been received by the  Laboratory. This result is neither positive nor negative.  Final results of testing will report in 24-48 hours, separately.         Narrative:      Droplet, Contact, and Eye Protection  Rule-out COVID-19 panel, includes droplet/contact/eye  isolation order.  Check influenza to order this test only if  specifically indicated.  Is " "patient being admitted?->Yes  Does this patient meet criteria for [facility]epheid [facility]  [facility]? (See workflow link below)->Yes  Expected turn around time?->Rush (Cepheid 2-4 hours)  Is this the patients First SARS CoV-2 test?->No  Is this patient employed in healthcare?->No  Is the patient symptomatic as defined by the CDC?->Yes  Date of symptom onset?->20  Is the patient hospitalized?->No  Is the patient a resident in a congregate care setting?->No  Is the patient pregnant?->No    URINALYSIS [[id number]]     Order Status: Canceled Specimen: Urine         MRSA nares swab if pneumonia is a concern (ordered/positive/negative/n-a): n/a    Recent Labs     11/13/20  2228 11/15/20  0238   WBC 15.2* 12.2*   NEUTSPOLYS 80.90* 81.80*     Recent Labs     11/13/20  2228 11/15/20  0238   BUN 48* 25*   CREATININE 6.31* 4.06*   ALBUMIN 3.7 3.4     Recent Labs     20  1249   VANCOTROUGH 15.3     Intake/Output Summary (Last 24 hours) at 11/15/2020 1424  Last data filed at 11/15/2020 0806  Gross per 24 hour   Intake 1780 ml   Output 2500 ml   Net -720 ml      /65   Pulse 94   Temp 37.8 °C (100.1 °F) (Oral)   Resp 19   Ht 1.676 m (5' 6\")   Wt 81.4 kg (179 lb 7.3 oz)   SpO2 94%  Temp (24hrs), Av.6 °C (99.7 °F), Min:36.8 °C (98.2 °F), Max:38.1 °C (100.6 °F)    Estimated Creatinine Clearance: 15.5 mL/min (A) (by C-G formula based on SCr of 4.06 mg/dL (H)).    A/P   1. Vancomycin dose change: not indicated   2. Next vancomycin level: 11/15/20 @0300 (ordered)  3. Goal trough: 10-15 mcg/mL  4. Comments: VS stable. Febrile to Tmax 100.6 °F. WBC elevated/improved. ESRD planned TTS currently. No new microbiology. Most recent vancomycin level noted and at goal range. Will plan for repeat vancomycin level with AM labs 11/15/20. Anticipate minimal vancomycin clearance outside of dialysis. Appears plans for surgical intervention per chart review. Pharmacy will continue to follow.    Tevin Leyva, " PharmD

## 2020-11-15 NOTE — PROGRESS NOTES
Daily Progress Note:     Date of Service: 11/15/2020  Primary Team: [employer] [facility] White Team   Attending: Harris Clay M.D.   Senior Resident: Dr. Sommers  Intern: Dr. Yang  Contact:  [phone]    Chief Complaint:   Right great toe gangrenous necrosis.    Subjective:  No acute overnight events.  Patient had a fever overnight 100.6.  Patient cannot feel his feet well.  Patient denies any discharge from the wound.    Consultants/Specialty:  Orthopedics Dr. Lynn, Nephrology    Review of Systems:    Review of Systems   Constitutional: Positive for fever. Negative for chills, diaphoresis and malaise/fatigue.   HENT: Negative for hearing loss.    Eyes: Negative for blurred vision and double vision.   Respiratory: Negative for cough, sputum production and shortness of breath.    Cardiovascular: Negative for chest pain, palpitations, orthopnea, leg swelling and PND.   Gastrointestinal: Negative for abdominal pain, blood in stool, constipation, diarrhea, melena, nausea and vomiting.   Genitourinary: Negative for dysuria, frequency, hematuria and urgency.   Musculoskeletal: Negative for falls and myalgias.        Right great toe wound with clear drainage   Skin: Negative for rash.   Neurological: Negative for dizziness, loss of consciousness, weakness and headaches.   Psychiatric/Behavioral: Negative for depression.       Objective Data:   Physical Exam:   Vitals:   Temp:  [36.8 °C (98.2 °F)-38.1 °C (100.6 °F)] 37.8 °C (100.1 °F)  Pulse:  [] 94  Resp:  [18-20] 19  BP: (126-156)/(54-73) 130/65  SpO2:  [90 %-98 %] 94 %    Physical Exam  Vitals signs and nursing note reviewed.   Constitutional:       General: He is not in acute distress.     Appearance: Normal appearance. He is not diaphoretic.   HENT:      Head: Normocephalic and atraumatic.      Nose: Nose normal.      Mouth/Throat:      Mouth: Mucous membranes are moist.      Pharynx: Oropharynx is clear.   Eyes:      Extraocular Movements: Extraocular movements  intact.      Conjunctiva/sclera: Conjunctivae normal.      Pupils: Pupils are equal, round, and reactive to light.   Neck:      Musculoskeletal: Neck supple.   Cardiovascular:      Rate and Rhythm: Normal rate and regular rhythm.      Heart sounds: Normal heart sounds. No murmur. No friction rub. No gallop.       Comments: Peripheral pulses are present  Pulmonary:      Effort: Pulmonary effort is normal. No respiratory distress.      Breath sounds: No wheezing, rhonchi or rales.   Abdominal:      General: Abdomen is flat. Bowel sounds are normal. There is no distension.      Palpations: Abdomen is soft.      Tenderness: There is no abdominal tenderness. There is no guarding.   Musculoskeletal:      Right lower leg: No edema.      Left lower leg: No edema.      Comments: Right great toe gangrenous necrosis with no discharge or signs of cellulitis. Minimal pain with palpation.   Skin:     General: Skin is warm.      Comments: Mild erythema around great toe   Neurological:      Mental Status: He is alert and oriented to person, place, and time. Mental status is at baseline.   Psychiatric:         Mood and Affect: Mood normal.           Labs:   Recent Labs     11/13/20  2228 11/15/20  0238   RBC 2.52* 2.29*   HEMOGLOBIN 8.5* 7.7*   HEMATOCRIT 25.7* 23.8*   PLATELETCT 402 356   IRON  --  30*   FERRITIN  --  1664.0*   TOTIRONBC  --  159*     Recent Labs     11/13/20 2228 11/14/20 0917 11/15/20  0238   SODIUM 134*  --  134*   POTASSIUM 4.4 4.3 4.4   CHLORIDE 91*  --  94*   CO2 27  --  29   BUN 48*  --  25*   CREATININE 6.31*  --  4.06*   MAGNESIUM 2.0  --   --    PHOSPHORUS  --   --  4.9*   CALCIUM 9.7  --  9.6     Recent Labs     11/13/20  2228 11/15/20  0238   ALTSGPT 12 5   ASTSGOT 11* 10*   ALKPHOSPHAT 88 74   TBILIRUBIN 0.4 0.4   GLUCOSE 158* 125*         Recent Labs     11/13/20 2228   CPKTOTAL 81     Recent Labs     11/14/20  0026   TRIGLYCERIDE 213*   HDL 21*   LDL 85     Recent Labs     11/13/20 2228  11/15/20  0238   WBC 15.2* 12.2*   NEUTSPOLYS 80.90* 81.80*   LYMPHOCYTES 9.80* 7.70*   MONOCYTES 8.10 9.10   EOSINOPHILS 0.10 0.00   BASOPHILS 0.30 0.30   ASTSGOT 11* 10*   ALTSGPT 12 5   ALKPHOSPHAT 88 74   TBILIRUBIN 0.4 0.4           Imaging:   EC-ECHOCARDIOGRAM COMPLETE W/O CONT   Final Result      DX-FOOT-COMPLETE 3+ RIGHT   Final Result      Soft tissue gas as described above. No definite evidence of osteomyelitis however. No fracture.      DX-CHEST-PORTABLE (1 VIEW)   Final Result      Minimal left basilar opacities which could represent early pneumonia although at least part of this may be related to calcific pleural plaquing as discussed above.      MR-FOOT-WITH & W/O RIGHT    (Results Pending)           Wound of right foot- (present on admission)  Assessment & Plan  -History of right great toe wound for 2 months that has had acute exacerbation with increased pain for last 3 days with clear liquid drainage  -Followed by vascular surgery outpatient s/p revascularization of RLE about 1 month ago  -Seen in office with vascular yesterday that confirmed his vascular flow is good in RLE, but referred patient to ED  -Afebrile and HD stable.  -Leukocytosis, ESR >120, CRP 24.4.   -Normal lactic acid and CPK  -FXR: shows evidence of gas in the tissue, however negative for osteomyelitis, fracture, or dislocation    Plan:  -Admit to orthopedics  -Orthopedics following  -NPO at midnight, Holding home Eliquis and Plavix  -Pain control  -Abx: IV Vancomycin and IV Zosyn  -Blood cultures NGTD  -Wound care consult  -MRI of feet pending, likely has osteomyelitis given ESR>100 and previous history. No bone exposed.    Peripheral vascular disease (HCC)- (present on admission)  Assessment & Plan  -Followed by vascular surgery outpatient  -S/p revascularization of RLE about 1 month ago  -Seen in vascular office yesterday and told blood flow is doing well in RLE, but referred to ED for antibiotics and further evaluation  -Pulses  heard on doppler exam in ED    Plan:  -bilateral lower extremity U/S with PATSY pending  -On ASA 81 daily  -Continue home Atorvastatin 40    End stage renal disease on dialysis (HCC)- (present on admission)  Assessment & Plan  -On TT hemodialysis outpatient  -Nephrology following  -Continue home Phos-lo for renal osteodystrophy    Anemia of chronic disease- (present on admission)  Assessment & Plan  Secondary to ESRD  Receiving epoietin.  Ferritin >500. No iron indicated. Could be acute phase reactant as well.    Chest pain  Assessment & Plan  -History of intermittent chest pain, non-radiating, intermittent, self resolving, lasting about 20-30 seconds  -EKG sinus rhythm with RBBB and LAFB, no ST changes concerning for ischemia or infarct  -No active chest pain at this time  -Troponin 142 --> 131  -EKG and stat troponin PRN if chest pain is to recur    Paroxysmal A-fib (HCC)- (present on admission)  Assessment & Plan  -In sinus rhythm on admission  -Rate is controlled. Coreg 3.125 bid  -Hold Eliquis for possible procedure    Secondary hyperparathyroidism of renal origin (HCC)- (present on admission)  Assessment & Plan  -Continue home Calcitriol    Diabetes mellitus type 2 in nonobese (HCC)- (present on admission)  Assessment & Plan  -Last A1c 04/2020: 5.8 now 7.8  -Likely contributing to lower extremity toe gangrene  -On Lantus 5 units at home  -Holding home Lantus  -SSI  -Hypoglycemic protocol  -Outpatient follow up needed    Dyslipidemia- (present on admission)  Assessment & Plan  -Continue home Atorvastatin    Gastroesophageal reflux disease- (present on admission)  Assessment & Plan  -Continue home Protonix 40 mg     Essential hypertension- (present on admission)  Assessment & Plan  -Continue home Coreg 3.125 mg BID    CAD (coronary artery disease)- (present on admission)  Assessment & Plan  -s/p LAD stent on Plavix at home   -Continuing home Atorvastatin  -Holding Plavix in anticipation of possible procedure      Hyperthyroidism- (present on admission)  Assessment & Plan  -Continue home Methimazole  -Last TSH was 10.0 with normal FT4  -TSH 0.15, T4 in the low range.

## 2020-11-15 NOTE — PROGRESS NOTES
Assumed care of patient at bedside report from NOC RN. Updated on POC. Patient currently A & O x 4; on room air; up x1 with handheld assist; with complaints of acute pain. Medicated per MAR. Call light within reach. Whiteboard updated. Fall precautions in place. Bed locked and in lowest position. All questions answered. No other needs indicated at this time.

## 2020-11-15 NOTE — CARE PLAN
Problem: Safety  Goal: Will remain free from falls  Outcome: PROGRESSING AS EXPECTED  Intervention: Implement fall precautions  Flowsheets  Taken 11/14/2020 1928  Bed Alarm: Yes - Alarm On  IV Pole on Same Side of Bed as Bathroom: Yes  Bedrails: Bedrails Closest to Bathroom Down  Chair/Bed Strip Alarm: Yes - Alarm On  Taken 11/14/2020 1630  Environmental Precautions:   Treaded Slipper Socks on Patient   Personal Belongings, Wastebasket, Call Bell etc. in Easy Reach   Transferred to Stronger Side   Report Given to Other Health Care Providers Regarding Fall Risk   Bed in Low Position   Communication Sign for Patients & Families   Mobility Assessed & Appropriate Sign Placed     Problem: Skin Integrity  Goal: Risk for impaired skin integrity will decrease  Outcome: PROGRESSING AS EXPECTED  Intervention: Implement precautions to protect skin integrity in collaboration with the interdisciplinary team  Flowsheets (Taken 11/14/2020 1630)  Skin Preventative Measures:   Pillows in Use for Support / Positioning   Pillows in Use to Float Heels  Bed Types: Pressure Redistribution Mattress (Atmosair)  Patient Turns / Repositioning: Patient Turns Self from Side to Side  Moisturizers/Barriers: Moisturizer   Patient is Receiving Nutrition: Oral Intake Adequate  Vitamin Therapy in Use: No  Activity: Bed  Assistance / Tolerance for Turning/Repositioning: Tolerates Well

## 2020-11-15 NOTE — ASSESSMENT & PLAN NOTE
-Secondary to ESRD, patient needs EPO supplementation at baseline  -Receiving epoietin.  -Ferritin >500. No iron indicated. Could be acute phase reactant as well.  -Hemoglobin dropped from 7.9 to 6.9 from the AM to PM of 11/16  -FOBT negative  -Low suspicion of active bleed at this time, drops better explained by chronic anemia/ESRD  -Will continue to monitor

## 2020-11-15 NOTE — PROGRESS NOTES
Monitor Summary  Rhythm: SR  Rate: 80-90  Ectopy: BBB, first degree heart block  .26 / .14 / .46

## 2020-11-16 NOTE — PROGRESS NOTES
"Pharmacy Kinetics 74 y.o. male on vancomycin day # 3 11/16/2020    Currently Dose: Vancomycin pulse dosing  11/14/20 3250 mg   11/14/20 VR 15.3 mcg/mL  11/16/20 VR 24.5 mcg/mL     Indication for Treatment: SSTI  Provider Specified End Date: 11/19/20  ID Service Following: No     Pertinent history per medical record: Admitted on 11/13/2020 for concern of SSTI of RLE in setting of DM. Orthopedics consulted with plans for surgical intervention.      Other antibiotics: piperacillin/tazobactam 4.5 gm iv q12h     Allergies: Mircera [methoxy polyethylene glycol-epoetin beta]      List concerns for accumulation of vancomycin: ESRD iHD TTS     Pertinent cultures to date:   Results     Procedure Component Value Units Date/Time    BLOOD CULTURE [[id number]] Collected: 11/13/20 2240    Order Status: Completed Specimen: Blood from Peripheral Updated: 11/15/20 0725     Significant Indicator NEG     Source BLD     Site PERIPHERAL     Culture Result No Growth  Note: Blood cultures are incubated for 5 days and  are monitored continuously.Positive blood cultures  are called to the RN and reported as soon as  they are identified.      Narrative:      Droplet, Contact, and Eye Protection  2 of 2 blood culture x2  Sites order. Per Hospital Policy:  Only change Specimen Src: to \"Line\" if specified by physician  order.  Right Wrist    BLOOD CULTURE [[id number]] Collected: 11/13/20 2228    Order Status: Completed Specimen: Blood from Peripheral Updated: 11/15/20 0725     Significant Indicator NEG     Source BLD     Site PERIPHERAL     Culture Result No Growth  Note: Blood cultures are incubated for 5 days and  are monitored continuously.Positive blood cultures  are called to the RN and reported as soon as  they are identified.      Narrative:      Droplet, Contact, and Eye Protection  1 of 2 for Blood Culture x 2 sites order. Per Hospital  Policy: Only change Specimen Src: to \"Line\" if specified by  physician order.  Right Forearm/Arm    " "CoV-2, Flu A/B, And RSV by PCR [[id number]] Collected: 11/13/20 2248    Order Status: Completed Updated: 11/14/20 0028     Influenza virus A RNA Negative     Influenza virus B, PCR Negative     RSV, PCR Negative     SARS-CoV-2 by PCR NotDetected     Comment: PATIENTS: Important information regarding your results and instructions can  be found at [email or web address]   \"After your  Covid-19 Test\"  [facility] providers: PLEASE REFER TO DE-ESCALATION AND RETESTING PROTOCOL  on [email or web address]  **The [employer] GeneXpert Xpress SARS-CoV-2 Test has been made available for  use under the Emergency Use Authorization (EUA) only.          SARS-CoV-2 Source NP Swab    Narrative:      Droplet, Contact, and Eye Protection  Rule-out COVID-19 panel, includes droplet/contact/eye  isolation order.  Check influenza to order this test only if  specifically indicated.  Is patient being admitted?->Yes  Does this patient meet criteria for Rush/Cepheid per [facility]  Inpatient Workflow? (See workflow link below)->Yes  Expected turn around time?->Rush (Cepheid 2-4 hours)  Is this the patients First SARS CoV-2 test?->No  Is this patient employed in healthcare?->No  Is the patient symptomatic as defined by the CDC?->Yes  Date of symptom onset?->11/12/20  Is the patient hospitalized?->No  Is the patient a resident in a congregate care setting?->No  Is the patient pregnant?->No    COVID/SARS CoV-2 PCR [[id number]] Collected: 11/13/20 2248    Order Status: Completed Specimen: Respirate from Nasopharyngeal Updated: 11/13/20 2301     COVID Order Status Received     Comment: The order for SARS CoV-2 testing has been received by the  Laboratory. This result is neither positive nor negative.  Final results of testing will report in 24-48 hours, separately.         Narrative:      Droplet, Contact, and Eye Protection  Rule-out COVID-19 panel, includes droplet/contact/eye  isolation order.  Check influenza to order this test only " "if  specifically indicated.  Is patient being admitted?->Yes  Does this patient meet criteria for [facility]? (See workflow link below)->Yes  Expected turn around time?->Rush (Cepheid 2-4 hours)  Is this the patients First SARS CoV-2 test?->No  Is this patient employed in healthcare?->No  Is the patient symptomatic as defined by the CDC?->Yes  Date of symptom onset?->20  Is the patient hospitalized?->No  Is the patient a resident in a congregate care setting?->No  Is the patient pregnant?->No    URINALYSIS [[id number]]     Order Status: Canceled Specimen: Urine         MRSA nares swab if pneumonia is a concern (ordered/positive/negative/n-a): n/a    Recent Labs     11/13/20  2228 11/15/20  0238 20  0327   WBC 15.2* 12.2* 13.0*   NEUTSPOLYS 80.90* 81.80* 81.20*     Recent Labs     11/13/20  2228 11/15/20  0238 20  0327   BUN 48* 25* 38*   CREATININE 6.31* 4.06* 5.92*   ALBUMIN 3.7 3.4 3.3     Recent Labs     20  1249 20  0327   VANCOTROUGH 15.3 24.5*     Intake/Output Summary (Last 24 hours) at 2020 0736  Last data filed at 11/15/2020 0806  Gross per 24 hour   Intake 240 ml   Output --   Net 240 ml      /74   Pulse 93   Temp 37.1 °C (98.8 °F) (Temporal)   Resp 17   Ht 1.676 m (5' 6\")   Wt 82.6 kg (182 lb 1.6 oz)   SpO2 92%  Temp (24hrs), Av.4 °C (99.3 °F), Min:37.1 °C (98.8 °F), Max:37.8 °C (100.1 °F)    Estimated Creatinine Clearance: 10.7 mL/min (A) (by C-G formula based on SCr of 5.92 mg/dL (HH)).    A/P   1. Vancomycin dose change: not indicated   2. Next vancomycin level: 20 @0300 (ordered)  3. Goal trough: 10-15 mcg/mL  4. Comments: VS stable. Febrile to Tmax 100.1 °F. WBC elevated. ESRD iHD planned TTS. No new microbiology. Most recent vancomycin level above goal. Anticipate minimal vancomycin clearance outside of dialysis. Vancomycin level in place prior to 20 iHD session to assess supplemental dose needs. Chart " review notes likely I&D today with orthopedic surgery. Pharmacy will continue to follow.    Tevin Leyva, PharmD

## 2020-11-16 NOTE — ANESTHESIA QCDR
2019 [facility] Clinical Data Registry (for Quality Improvement)     Postoperative nausea/vomiting risk protocol (Adult = 18 yrs and Pediatric 3-17 yrs)- (430 and 463)  General inhalation anesthetic (NOT TIVA) with PONV risk factors: Yes  Provision of anti-emetic therapy with at least 2 different classes of agents: Yes   Patient DID NOT receive anti-emetic therapy and reason is documented in Medical Record:  N/A    Multimodal Pain Management- (477)  Non-emergent surgery AND patient age >= 18:   Use of Multimodal Pain Management, two or more drugs and/or interventions, NOT including systemic opioids:   Exception: Documented allergy to multiple classes of analgesics:     Smoking Abstinence (404)  Patient is current smoker (cigarette, pipe, e-cig, marijuanna):   Elective Surgery:   Abstinence instructions provided prior to day of surgery:   Patient abstained from smoking on day of surgery:     Pre-Op Beta-Blocker in Isolated CABG (44)  Isolated CABG AND patient age >= 18:   Beta-blocker admin within 24 hours of surgical incision:   Exception:of medical reason(s) for not administering beta blocker within 24 hours prior to surgical incision (e.g., not  indicated,other medical reason):     PACU assessment of acute postoperative pain prior to Anesthesia Care End- Applies to Patients Age = 18- (ABG7)  Initial PACU pain score is which of the following: < 7/10  Patient unable to report pain score: N/A    Post-anesthetic transfer of care checklist/protocol to PACU/ICU- (426 and 427)  Upon conclusion of case, patient transferred to which of the following locations: PACU/Non-ICU  Use of transfer checklist/protocol:   Exclusion: Service Performed in Patient Hospital Room (and thus did not require transfer):   Unplanned admission to ICU related to anesthesia service up through end of PACU care- (MD51)  Unplanned admission to ICU (not initially anticipated at anesthesia start time): No

## 2020-11-16 NOTE — OP REPORT
DATE OF SERVICE:  11/16/2020    PREOPERATIVE DIAGNOSES:  1.  Left gangrene toe.  2.  Left foot cellulitis with abscess.    POSTOPERATIVE DIAGNOSES:  1.  Left gangrene toe.  2.  Left foot cellulitis with abscess.    PROCEDURES PERFORMED:  1.  Left foot irrigation and debridement, multiple compartments.  2.  Left first ray amputation.  3.  Left metatarsal bone biopsy.    SURGEON:  Leobardo Lynn MD    FIRST ASSISTANT:  Sukh Aguero MD    SECOND ASSISTANT:  Kamryn Birmingham.    ANESTHESIA:  General endotracheal with block.    ESTIMATED BLOOD LOSS:  None.    COMPLICATIONS:  None.    POSTOPERATIVE PLAN:  1.  Postop shoe.  2.  Perioperative antibiotics.    INDICATIONS:  The patient is a longstanding patient of mine.  He saw me in   office.  His condition worsened and so he went to the emergency room and was   admitted to the hospital.  Please see my consult note for indications and   risks of surgery.    PROCEDURE IN DETAIL:  The patient was brought to the operating room and   underwent general endotracheal anesthesia without complications.  His left   lower extremity was prepped and draped in standard fashion in supine position   with all appropriate padding.  Positive site verification confirmed his left   lower extremity as well as above procedure and confirmation that he received   preoperative antibiotics.  Esmarch was used, the tourniquet was deflated.    Using a 15 blade, sharp excisional debridement was performed down to the level   of bone circumferentially around his great toe.  More proximally, further   sharp dissection was made for elimination of necrotic tissue past the area of   anticipated amputation.  A disarticulation was performed.  The distal third of   his metatarsal head was shaved off with a microsagittal saw and was sent off   as biopsy.  Thorough irrigation was performed.  Any residual tissue that did   not appear viable was sharply excised.  The wound was closed with interrupted   nylon suture  after vancomycin powder was placed.  Wound was closed with 3-0   Vicryl and 3-0 nylon.  Sterile dressings were applied.  He was transferred to   recovery room in good condition.    Utilization of Dr. Aguero was necessary for patient positioning, holding,   retracting, wound closure, and dressing placement.  He was present throughout   the entire procedure.       ____________________________________     MD VIC WATT / DALILA    DD:  11/16/2020 10:09:17  DT:  11/16/2020 10:34:10    D#:  [id number]  Job#:  [id number]

## 2020-11-16 NOTE — PROGRESS NOTES
Pt transported to [facility] pre-op surgery via transport and ACLS RN, report given to pre-op Rn, CANG bath completed.

## 2020-11-16 NOTE — CONSULTS
DATE OF SERVICE:  11/16/2020    REASON FOR CONSULTATION:  The patient was brought in to the hospital for   worsening left foot infection.    HISTORY OF PRESENT ILLNESS:  This is a former patient of mine.  We have been   following his gangrenous toe.  His condition worsens, so he was admitted to   the hospital.  He has general malaise.  He had some increasing pain in his   foot.  He does have some mild low-grade fevers.    Past medical history, medications, allergies, family history, social history,   review of systems are reviewed on Dr. Clay admit note.    PHYSICAL EXAMINATION:  VITAL SIGNS:  Afebrile, vital signs stable.  GENERAL:  Well-appearing male, in no acute distress.  PSYCHIATRIC:  Pleasant demeanor.  Normal affect.  EYES:  Pupils equal, round.  RESPIRATIONS:  Regular rate, unlabored breathing.  CARDIOVASCULAR:  He has weak, but palpable pulses in his foot.  He has brisk   capillary refill in his lesser toes.  SKIN:  Shows some gangrene of his great toe with some surrounding cellulitis.    He has tenderness to palpation in the area.  NEUROLOGIC:  Diminished sensation of his toes.  Muscle strength appears to be   satisfactory.  MUSCULOSKELETAL:  Feet have good alignment and good range of motion.  There is   no instability.  He has had decreased muscle strength.  He is nonambulatory.    IMAGING:  X-rays reviewed, demonstrate no obvious bony abnormality or evidence   for osteomyelitis.    LABORATORY DATA:  Demonstrate a white count of 16 on admission.    IMPRESSION:  1.  Peripheral vascular disease.  2.  Left great toe gangrene.  3.  Left foot cellulitis with likely deeper infection.    PLAN:  I had a discussion with him about his options including operative and   nonoperative.  He elected to proceed with operative intervention.  Recommend   left foot irrigation and debridement with first toe amputation, possible ray   amputation.  The procedure and postoperative course were discussed in detail.    All  questions were answered.  Risks of surgery were explained, which include   but not limited to wound problems, infection, nerve injury, vascular injury,   need for further surgery.  He understands and accepts this risk of his pain,   infection, wound problems, and need for further surgery.  He understands and   accepts these risks and agreed to proceed.  We will schedule this next   available.       ____________________________________     MD VIC WATT / DALILA    DD:  11/16/2020 10:12:04  DT:  11/16/2020 11:25:05    D#:  [id number]  Job#:  [id number]    cc: [facility]

## 2020-11-16 NOTE — PROGRESS NOTES
"[facility] -  PROGRESS NOTE               Author: Marvin Mahajan M.D. Date & Time: 11/16/2020  10:22 AM     HPI:  75 yo M PMH ESRD TTS iHD, HTN, type 2 DM, anemia of CKD, and CKD-MBD, CAD s/p stents, IDDM, A. fib, hypothyroidism and GERD who presented to the [facility] ER with worsening toe gangrene. Has undelrying severe and s/p L great toe amputation in may 2020. Followed by vascular surgery outpatient with a repeat balloon angioplasty about 1 month ago. For last several days worse right toe pain with subjective fevers. Last HD on Thursday. No issues with access of electrolytes. In the ED was noted to have sepsis from gangrene toe promting admission.  No chest pain or shortness of breath.  Without fever, chills, sweats    DAILY NEPHROLOGY SUMMARY:  11/14: consult done  11/15: tolerated HD, pending R. Great toe amputation tomorrow   11/16: NAEO, taken down to OR this AM for amputation    PAST FAMILY HISTORY: Reviewed and Unchanged  SOCIAL HISTORY: Reviewed and Unchanged  CURRENT MEDICATIONS: Reviewed  IMAGING STUDIES: Reviewed    ROS  General: No malaise  CV: No chest pain  RESP: No shortness of breath  GI: No abdominal pain   MSK: +toe pain  All other systems reviewed and negative    PHYSICAL EXAM  VS:  /42   Pulse 79   Temp 37.3 °C (99.2 °F) (Temporal)   Resp 16   Ht 1.676 m (5' 6\")   Wt 82.6 kg (182 lb 1.6 oz)   SpO2 97%   BMI 29.39 kg/m²   GENERAL: no acute distress  CV: RRR, No edema  RESP: non-labored  GI: Soft  MSK: Right foot with dressing at site of amputation  SKIN: No concerning rashes  NEURO: AOx3  PSYCH: Cooperative    Fluids:  In: 1040 [P.O.:1040]  Out: -     LABS:  Recent Results (from the past 24 hour(s))   ACCU-CHEK GLUCOSE    Collection Time: 11/15/20 11:25 AM   Result Value Ref Range    Glucose - Accu-Ck 152 (H) 65 - 99 mg/dL   ACCU-CHEK GLUCOSE    Collection Time: 11/15/20  4:49 PM   Result Value Ref Range    Glucose - Accu-Ck 146 (H) 65 - 99 mg/dL "   VANCOMYCIN TROUGH LEVEL    Collection Time: 11/16/20  3:27 AM   Result Value Ref Range    Vancomycin Trough 24.5 (H) 10.0 - 20.0 ug/mL   CBC WITH DIFFERENTIAL    Collection Time: 11/16/20  3:27 AM   Result Value Ref Range    WBC 13.0 (H) 4.8 - 10.8 K/uL    RBC 2.43 (L) 4.70 - 6.10 M/uL    Hemoglobin 7.9 (L) 14.0 - 18.0 g/dL    Hematocrit 24.5 (L) 42.0 - 52.0 %    .8 (H) 81.4 - 97.8 fL    MCH 32.5 27.0 - 33.0 pg    MCHC 32.2 (L) 33.7 - 35.3 g/dL    RDW 51.5 (H) 35.9 - 50.0 fL    Platelet Count 399 164 - 446 K/uL    MPV 8.7 (L) 9.0 - 12.9 fL    Neutrophils-Polys 81.20 (H) 44.00 - 72.00 %    Lymphocytes 8.80 (L) 22.00 - 41.00 %    Monocytes 8.40 0.00 - 13.40 %    Eosinophils 0.00 0.00 - 6.90 %    Basophils 0.40 0.00 - 1.80 %    Immature Granulocytes 1.20 (H) 0.00 - 0.90 %    Nucleated RBC 0.00 /100 WBC    Neutrophils (Absolute) 10.58 (H) 1.82 - 7.42 K/uL    Lymphs (Absolute) 1.14 1.00 - 4.80 K/uL    Monos (Absolute) 1.09 (H) 0.00 - 0.85 K/uL    Eos (Absolute) 0.00 0.00 - 0.51 K/uL    Baso (Absolute) 0.05 0.00 - 0.12 K/uL    Immature Granulocytes (abs) 0.16 (H) 0.00 - 0.11 K/uL    NRBC (Absolute) 0.00 K/uL   Comp Metabolic Panel    Collection Time: 11/16/20  3:27 AM   Result Value Ref Range    Sodium 130 (L) 135 - 145 mmol/L    Potassium 4.5 3.6 - 5.5 mmol/L    Chloride 92 (L) 96 - 112 mmol/L    Co2 23 20 - 33 mmol/L    Anion Gap 15.0 7.0 - 16.0    Glucose 142 (H) 65 - 99 mg/dL    Bun 38 (H) 8 - 22 mg/dL    Creatinine 5.92 (HH) 0.50 - 1.40 mg/dL    Calcium 9.8 8.5 - 10.5 mg/dL    AST(SGOT) 8 (L) 12 - 45 U/L    ALT(SGPT) 6 2 - 50 U/L    Alkaline Phosphatase 76 30 - 99 U/L    Total Bilirubin 0.4 0.1 - 1.5 mg/dL    Albumin 3.3 3.2 - 4.9 g/dL    Total Protein 6.8 6.0 - 8.2 g/dL    Globulin 3.5 1.9 - 3.5 g/dL    A-G Ratio 0.9 g/dL   PHOSPHORUS    Collection Time: 11/16/20  3:27 AM   Result Value Ref Range    Phosphorus 5.0 (H) 2.5 - 4.5 mg/dL   MAGNESIUM    Collection Time: 11/16/20  3:27 AM   Result Value Ref Range     Magnesium 1.9 1.5 - 2.5 mg/dL   ESTIMATED GFR    Collection Time: 11/16/20  3:27 AM   Result Value Ref Range    GFR If  11 (A) >60 mL/min/1.73 m 2    GFR If Non African American 9 (A) >60 mL/min/1.73 m 2   ACCU-CHEK GLUCOSE    Collection Time: 11/16/20  8:09 AM   Result Value Ref Range    Glucose - Accu-Ck 143 (H) 65 - 99 mg/dL       (click the triangle to expand results)    ASSESSMENT:  # ESRD: TTS iHD @ [facility]  # R. 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

## 2020-11-16 NOTE — CARE PLAN
Problem: Communication  Goal: The ability to communicate needs accurately and effectively will improve  11/16/2020 1159 by Abby Brady R.N.  Outcome: PROGRESSING AS EXPECTED    Problem: Safety  Goal: Will remain free from falls  Outcome: PROGRESSING AS EXPECTED  Note: Fall precautions in place. Pt has been educated to call for assistance when wanting to ambulate.  Pt Calls for Assistance: Yes

## 2020-11-16 NOTE — ANESTHESIA PREPROCEDURE EVALUATION
Relevant Problems   CARDIAC   (+) CAD (coronary artery disease)   (+) Coronary artery disease, non-occlusive   (+) Essential hypertension   (+) Left anterior fascicular block   (+) NSTEMI (non-ST elevated myocardial infarction) ([facility])   (+) Paroxysmal A-fib ([facility])   (+) Presence of stent in LAD coronary artery   (+) Right bundle branch block      GI   (+) Gastroesophageal reflux disease         (+) End stage renal disease on dialysis ([facility])   (+) Hepatic cyst   (+) Renal cyst      ENDO   (+) Diabetes mellitus type 2 in nonobese ([facility])   (+) Hyperthyroidism       Physical Exam    Airway   Mallampati: II  TM distance: >3 FB  Neck ROM: full       Cardiovascular - normal exam  Rhythm: regular  Rate: normal  (-) murmur     Dental - normal exam           Pulmonary - normal exam  Breath sounds clear to auscultation     Abdominal    Neurological - normal exam                 Anesthesia Plan    ASA 4   ASA physical status 4 criteria: CAD/stents - recent (< 3 months), severe reduction of ejection fractions, DUYEN and ESRD not undergoing regularly scheduled dialysis    Plan - general       Airway plan will be LMA        Induction: intravenous    Postoperative Plan: Postoperative administration of opioids is intended.    Pertinent diagnostic labs and testing reviewed    Informed Consent:    Anesthetic plan and risks discussed with patient.    Use of blood products discussed with: patient whom consented to blood products.

## 2020-11-16 NOTE — PROGRESS NOTES
Pt refused morning medications requesting to be done after surgery, Pt educated. Will continue to monitor.

## 2020-11-16 NOTE — PROGRESS NOTES
POST-OP NOTE FOR LIMB PRESERVATION SERVICE    SURGERY DATE: 11/16/2020    PROCEDURE: Procedure(s):  AMPUTATION, TOE GREAT - Wound Class: Dirty or Infected    WEIGHT BEARING STATUS: Weight bearing as tolerated in postop shoe    PT CONSULT: Yes    ANTIBIOTICS: Continue current ABX    PLAN TO RETURN TO O.R.: No    WOUND CARE PLAN: Resume previous Wound Care orders.    FOLLOW-UP: Follow up with  Sukh Aguero M.D.  in 2 weeks    DURABLE MEDICAL EQUIPMENT:  Hard sole shoe    OTHER: f/u [facility] bone culture for any osteomyelitis      Sukh Aguero M.D.

## 2020-11-16 NOTE — PROGRESS NOTES
Daily Progress Note:     Date of Service: 11/16/2020  Primary Team: [facility] Team   Attending: Harris Clay M.D.   Senior Resident: Dr. Sommers  Intern: Dr. Saldana  Contact:  [phone]    ID Statement:  74 year old man with history of PVD, DM2, and prior amputations presenting with a R great toe gangrenous necrosis    Subjective:  -No acute events overnight  -Had some N/V this AM, now resolved  -Had Dialysis yesterday, tolerated well    Interval Updates:  -NPO since midnight for procedure today  -Taken by surgery for toe amputation    Consultants/Specialty:  Orthopedics  Nephrology  Review of Systems:    Review of Systems   Constitutional: Negative for chills, diaphoresis, fever and malaise/fatigue.   HENT: Negative for hearing loss.    Eyes: Negative for blurred vision and double vision.   Respiratory: Negative for cough, sputum production and shortness of breath.    Cardiovascular: Negative for chest pain, palpitations, orthopnea, leg swelling and PND.   Gastrointestinal: Negative for abdominal pain, blood in stool, constipation, diarrhea, heartburn, melena, nausea and vomiting.   Genitourinary: Negative for dysuria, frequency, hematuria and urgency.   Musculoskeletal: Negative for falls and myalgias.        Right great toe wound with clear drainage   Skin: Negative for rash.   Neurological: Negative for dizziness, loss of consciousness, weakness and headaches.   Psychiatric/Behavioral: Negative for depression and suicidal ideas.       Objective Data:   Physical Exam:   Vitals:   Temp:  [36.8 °C (98.2 °F)-37.4 °C (99.4 °F)] 36.9 °C (98.4 °F)  Pulse:  [] 78  Resp:  [12-19] 18  BP: ()/(42-74) 106/55  SpO2:  [92 %-99 %] 94 %     Physical Exam  Vitals signs and nursing note reviewed.   Constitutional:       General: He is not in acute distress.     Appearance: Normal appearance. He is not diaphoretic.   HENT:      Head: Normocephalic and atraumatic.      Nose: Nose normal.      Mouth/Throat:       Mouth: Mucous membranes are moist.   Eyes:      General: No scleral icterus.     Extraocular Movements: Extraocular movements intact.      Conjunctiva/sclera: Conjunctivae normal.      Pupils: Pupils are equal, round, and reactive to light.   Neck:      Musculoskeletal: Neck supple. No neck rigidity.   Cardiovascular:      Rate and Rhythm: Normal rate and regular rhythm.      Pulses: Normal pulses.      Heart sounds: No murmur.   Pulmonary:      Effort: Pulmonary effort is normal. No respiratory distress.      Breath sounds: No wheezing.   Abdominal:      General: Abdomen is flat. Bowel sounds are normal. There is no distension.      Palpations: Abdomen is soft. There is no mass.      Tenderness: There is no abdominal tenderness. There is no guarding.   Musculoskeletal:      Right lower leg: No edema.      Left lower leg: No edema.      Comments: Right great toe gangrenous necrosis with no discharge or signs of cellulitis. Minimal pain with palpation. Open to air, covered in sterile prep fluid   Skin:     General: Skin is warm.      Capillary Refill: Capillary refill takes less than 2 seconds.      Comments: Mild erythema around great toe, toe covered in antiseptic fluid   Neurological:      Mental Status: He is alert and oriented to person, place, and time.      Motor: No weakness.   Psychiatric:         Mood and Affect: Mood normal.         Behavior: Behavior normal.         Labs:   Results for SEGUN BRADSHAW (MRN [id number]) as of 11/16/2020 11:40   Ref. Range 11/16/2020 03:27 11/16/2020 08:09   WBC Latest Ref Range: 4.8 - 10.8 K/uL 13.0 (H)    RBC Latest Ref Range: 4.70 - 6.10 M/uL 2.43 (L)    Hemoglobin Latest Ref Range: 14.0 - 18.0 g/dL 7.9 (L)    Hematocrit Latest Ref Range: 42.0 - 52.0 % 24.5 (L)    MCV Latest Ref Range: 81.4 - 97.8 fL 100.8 (H)    MCH Latest Ref Range: 27.0 - 33.0 pg 32.5    MCHC Latest Ref Range: 33.7 - 35.3 g/dL 32.2 (L)    RDW Latest Ref Range: 35.9 - 50.0 fL 51.5 (H)    Platelet  Count Latest Ref Range: 164 - 446 K/uL 399    MPV Latest Ref Range: 9.0 - 12.9 fL 8.7 (L)    Neutrophils-Polys Latest Ref Range: 44.00 - 72.00 % 81.20 (H)    Neutrophils (Absolute) Latest Ref Range: 1.82 - 7.42 K/uL 10.58 (H)    Lymphocytes Latest Ref Range: 22.00 - 41.00 % 8.80 (L)    Lymphs (Absolute) Latest Ref Range: 1.00 - 4.80 K/uL 1.14    Monocytes Latest Ref Range: 0.00 - 13.40 % 8.40    Monos (Absolute) Latest Ref Range: 0.00 - 0.85 K/uL 1.09 (H)    Eosinophils Latest Ref Range: 0.00 - 6.90 % 0.00    Eos (Absolute) Latest Ref Range: 0.00 - 0.51 K/uL 0.00    Basophils Latest Ref Range: 0.00 - 1.80 % 0.40    Baso (Absolute) Latest Ref Range: 0.00 - 0.12 K/uL 0.05    Immature Granulocytes Latest Ref Range: 0.00 - 0.90 % 1.20 (H)    Immature Granulocytes (abs) Latest Ref Range: 0.00 - 0.11 K/uL 0.16 (H)    Nucleated RBC Latest Units: /100 WBC 0.00    NRBC (Absolute) Latest Units: K/uL 0.00    Sodium Latest Ref Range: 135 - 145 mmol/L 130 (L)    Potassium Latest Ref Range: 3.6 - 5.5 mmol/L 4.5    Chloride Latest Ref Range: 96 - 112 mmol/L 92 (L)    Co2 Latest Ref Range: 20 - 33 mmol/L 23    Anion Gap Latest Ref Range: 7.0 - 16.0  15.0    Glucose Latest Ref Range: 65 - 99 mg/dL 142 (H)    Bun Latest Ref Range: 8 - 22 mg/dL 38 (H)    Creatinine Latest Ref Range: 0.50 - 1.40 mg/dL 5.92 (HH)    GFR If  Latest Ref Range: >60 mL/min/1.73 m 2 11 (A)    GFR If Non  Latest Ref Range: >60 mL/min/1.73 m 2 9 (A)    Calcium Latest Ref Range: 8.5 - 10.5 mg/dL 9.8    AST(SGOT) Latest Ref Range: 12 - 45 U/L 8 (L)    ALT(SGPT) Latest Ref Range: 2 - 50 U/L 6    Alkaline Phosphatase Latest Ref Range: 30 - 99 U/L 76    Total Bilirubin Latest Ref Range: 0.1 - 1.5 mg/dL 0.4    Albumin Latest Ref Range: 3.2 - 4.9 g/dL 3.3    Total Protein Latest Ref Range: 6.0 - 8.2 g/dL 6.8    Globulin Latest Ref Range: 1.9 - 3.5 g/dL 3.5    A-G Ratio Latest Units: g/dL 0.9    Phosphorus Latest Ref Range: 2.5 - 4.5  mg/dL 5.0 (H)    Magnesium Latest Ref Range: 1.5 - 2.5 mg/dL 1.9    Vancomycin Trough Latest Ref Range: 10.0 - 20.0 ug/mL 24.5 (H)    Glucose - Accu-Ck Latest Ref Range: 65 - 99 mg/dL  143 (H)     Imaging:   -No new imaging  -Pathology specimen sent  Problem Representation: 74 year old male with PMH notable for PVD s/p left great toe amputation, DM2, ESRD on dialysis, CAD s/p stenting, who presented for a 2 month history of right great toe wound, s/p amputation on 11/16.    Wound of right foot- (present on admission)  Assessment & Plan  -History of right great toe wound for 2 months that has had acute exacerbation with increased pain for last 3 days with clear liquid drainage  -Followed by vascular surgery outpatient s/p revascularization of RLE about 1 month ago  -Seen in office with vascular yesterday that confirmed his vascular flow is good in RLE, but referred patient to ED  -Afebrile and HD stable.  -Leukocytosis, ESR >120, CRP 24.4.   -Normal lactic acid and CPK  -FXR: shows evidence of gas in the tissue, however negative for osteomyelitis, fracture, or dislocation  -Taken for amputation of R great toe on 11/16/2020    Plan:  -Orthopedics following  -NPO since midnight, Holding home Eliquis and Plavix  -Pain control  -Abx: IV Vancomycin and IV Zosyn  -Blood cultures NGTD  -Wound care consult  -Will follow amputation pathology histo  -ID Consult    Peripheral vascular disease (HCC)- (present on admission)  Assessment & Plan  -Followed by vascular surgery outpatient  -S/p revascularization of RLE about 1 month ago  -Seen in vascular office yesterday and told blood flow is doing well in RLE, but referred to ED for antibiotics and further evaluation  -Pulses heard on doppler exam in ED    Plan:  -bilateral lower extremity U/S with PATSY pending  -On ASA 81 daily  -Continue home Atorvastatin 40    End stage renal disease on dialysis (HCC)- (present on admission)  Assessment & Plan  -On TT hemodialysis  outpatient  -Had Dialysis on 11/15  -Nephrology following  -Continue home Phos-lo for renal osteodystrophy    Anemia of chronic disease- (present on admission)  Assessment & Plan  Secondary to ESRD  Receiving epoietin.  Ferritin >500. No iron indicated. Could be acute phase reactant as well.    Chest pain  Assessment & Plan  -History of intermittent chest pain, non-radiating, intermittent, self resolving, lasting about 20-30 seconds  -EKG sinus rhythm with RBBB and LAFB, no ST changes concerning for ischemia or infarct  -No active chest pain at this time  -Troponin 142 --> 131  -EKG and stat troponin PRN if chest pain is to recur    Paroxysmal A-fib (HCC)- (present on admission)  Assessment & Plan  -In sinus rhythm on admission  -Rate is controlled. Coreg 3.125 bid  -Hold Eliquis for possible procedure    Secondary hyperparathyroidism of renal origin (HCC)- (present on admission)  Assessment & Plan  -Continue home Calcitriol    Diabetes mellitus type 2 in nonobese ([facility])- (present on admission)  Assessment & Plan  -Last A1c 04/2020: 5.8 now 7.8  -Likely contributing to lower extremity toe gangrene  -On Lantus 5 units at home  -Holding home Lantus  -SSI  -Hypoglycemic protocol  -Outpatient follow up needed    Dyslipidemia- (present on admission)  Assessment & Plan  -Continue home Atorvastatin    Gastroesophageal reflux disease- (present on admission)  Assessment & Plan  -Continue home Protonix 40 mg     Essential hypertension- (present on admission)  Assessment & Plan  -Continue home Coreg 3.125 mg BID    CAD (coronary artery disease)- (present on admission)  Assessment & Plan  -s/p LAD stent on Plavix at home   -Continuing home Atorvastatin  -Holding Plavix in anticipation of possible procedure     Hyperthyroidism- (present on admission)  Assessment & Plan  -Continue home Methimazole  -Last TSH was 10.0 with normal FT4  -TSH 0.15, T4 in the low range.        Quality Measures:  Code: FULL  VTE: Holding for  procedure  Diet: NPO for procedure  Disposition: Inpatient

## 2020-11-16 NOTE — PROGRESS NOTES
Transfer note       74 male, full code    # R great toe wound x3 days throbbing with clear drainage, black eschar, tenderness  - h/o severe PVD on plavix, DM A1c 7.8 s/p left toe amp, peripheral neuropathy, vasc surg RLE flow intact, WBC 15,  CRP elevated, LA/CPK wnl, CXR ?early PNA, XR toe soft tissue gas, no sxs of OM, TTE - improved LV fxn 55%, no wall motion, moderate Left atrial enlargement, 40 RVSP.  11/16/2020 - S/p InD, first ray amputation, metatarsal bone bx, necrotic tissue past the area of anticipated amputation   Currently on vancomycin and zosyn for possible osteomyelitis. Consider ID consult.    # Atypical chest pain  # CAD s/p LAD stent, elevated trop, EKG RBBB, LAFB no ischemia  # p Afib on eliquis  # ESRD TTHS HD, phoslo, nephro onboard.  # Hyperthyroidism, on methimazole  # HLD on atorva 40  # Anemia of chronic disease    Dispo: PT/OT/wound care    Things to follow  Reinitiate Heparin dvt ppx after surgery  Tissue pathology  Consider ID consult  Follow blood cultures  BL US with PATSY  HD TTS  Orthopaedics recommendations

## 2020-11-16 NOTE — PROGRESS NOTES
Bedside report received. Pt A&Ox4. POC discussed with pt. Pt verbalizes understanding. Call light and belongings within reach. Bed locked and in lowest position. Alarm and fall precautions in place.

## 2020-11-16 NOTE — ANESTHESIA PROCEDURE NOTES
Airway    Date/Time: 11/16/2020 9:10 AM  Performed by: Souleymane Salinas M.D.  Authorized by: Souleymane Salinas M.D.     Location:  OR  Urgency:  Elective  Indications for Airway Management:  Anesthesia      Spontaneous Ventilation: absent    Sedation Level:  Deep  Preoxygenated: Yes    Final Airway Type:  Supraglottic airway  Final Supraglottic Airway:  Standard LMA    SGA Size:  4  Number of Attempts at Approach:  1

## 2020-11-16 NOTE — DISCHARGE PLANNING
Outpatient Dialysis Note    Confirmed patient is active at:    [facility]  [address]  [place] [address]    Schedule: Tuesday, Thursday, Saturday   Time: 06:15am     Patient is seen by Dr. Hunter in HD clinic.    Spoke with Clayton at facility who confirmed.    Forwarded records for review.    Becca Bojorquez- Dialysis Coordinator  [facility] # [phone]

## 2020-11-16 NOTE — ANESTHESIA TIME REPORT
Anesthesia Start and Stop Event Times     Date Time Event    11/16/2020 0839 Ready for Procedure     0908 Anesthesia Start     0953 Anesthesia Stop        Responsible Staff  11/16/20    Name Role Begin End    Souleymane Salinas M.D. 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

## 2020-11-16 NOTE — OR NURSING
Patient A+Ox4. Denies pain or nausea.  Tolerating po well.  VSS. Ortho tech here to apply surgical boot.  Dressing to right foot clean and dry.  elevated on pillow. Plan to return to room post-op

## 2020-11-16 NOTE — PROGRESS NOTES
LPS rounding note    Patient seen and examined on rounds.  Right foot with necrosis to great toe to near the MTP joint plantarly.  Erythema extending to approximately the MTP joint slightly proximal dorsally.    Will need I&D of the foot with great toe amputation.    N.p.o.  OR this afternoon

## 2020-11-16 NOTE — ANESTHESIA POSTPROCEDURE EVALUATION
Patient: Luis Sepulveda    Procedure Summary     Date: 11/16/20 Room / Location: [place] [address] / SURGERY [facility]    Anesthesia Start: 0908 Anesthesia Stop: 0953    Procedure: AMPUTATION, TOE GREAT (Right Toe) Diagnosis: (gangrene right great toe )    Surgeons: Leobardo Lynn M.D. Responsible Provider: Souleymane Salinas M.D.    Anesthesia Type: general ASA Status: 4          Final Anesthesia Type: general  Last vitals  BP   Blood Pressure : 156/66    Temp   37.4 °C (99.4 °F)    Pulse   Pulse: 95   Resp   17    SpO2   93 %      Anesthesia Post Evaluation    Patient location during evaluation: PACU  Patient participation: complete - patient participated  Level of consciousness: awake and alert  Pain score: 1    Airway patency: patent  Anesthetic complications: no  Cardiovascular status: hemodynamically stable  Respiratory status: acceptable  Hydration status: euvolemic    PONV: none           Nurse Pain Score: 7 (NPRS)

## 2020-11-16 NOTE — PROGRESS NOTES
Report received from [facility] PACU, JAN. Updated on POC.  Assumed care of patient upon arrival to unit. Patient currently A & O x 4; on 2 L O2; up without complaints of acute pain. Pt placed on monitor, monitor room notified, SR 82. Patient oriented to unit and to call light system. Call light within reach. Pt educated to fall risk. Fall precautions in place. Pt provided with personal grooming items. Bed locked and in lowest position. All questions answered. No other needs indicated at this time. Post-op vitals initiated.

## 2020-11-17 PROBLEM — R41.82 ALTERED MENTAL STATUS: Status: ACTIVE | Noted: 2020-01-01

## 2020-11-17 NOTE — CARE PLAN
Problem: Safety  Goal: Will remain free from falls  Outcome: PROGRESSING AS EXPECTED  Intervention: Implement fall precautions  Flowsheets  Taken 11/17/2020 0730 by Rosemarie Mansfield R.N.  Environmental Precautions:   Treaded Slipper Socks on Patient   Personal Belongings, Wastebasket, Call Bell etc. in Easy Reach   Transferred to Stronger Side   Report Given to Other Health Care Providers Regarding Fall Risk   Bed in Low Position   Communication Sign for Patients & Families   Mobility Assessed & Appropriate Sign Placed  Taken 11/16/2020 1910 by Meliza Segura R.N.  IV Pole on Same Side of Bed as Bathroom: Yes  Bedrails: Bedrails Closest to Bathroom Down  Chair/Bed Strip Alarm: Yes - Alarm On  Taken 11/14/2020 1928 by Rosemarie Mansfield R.N.  Bed Alarm: Yes - Alarm On     Problem: Skin Integrity  Goal: Risk for impaired skin integrity will decrease  Outcome: PROGRESSING AS EXPECTED  Intervention: Implement precautions to protect skin integrity in collaboration with the interdisciplinary team  Flowsheets (Taken 11/17/2020 0730)  Skin Preventative Measures:   Pillows in Use for Support / Positioning   Pillows in Use to Float Heels   Silicone Oxygen Tubing in Use   Seat Cushion in Use on Chair when Out of Bed   Waffle Cushion   Gray Foam for Oxygen Tubing (Pad ear protector)   Remove Glasses While Patient is Sleeping  Bed Types: Pressure Redistribution Mattress (Atmosair)  Friction Interventions: Draw Sheet / Pad Used for Repositioning  Patient Turns / Repositioning: Patient Turns Self from Side to Side  Moisturizers/Barriers:   Barrier Cream   Moisturizer  Patient is Receiving Nutrition: Oral Intake Adequate  Vitamin Therapy in Use: No  Activity:   Ambulated   Bed   Chair   Edge of bed   Up to bathroom  Assistance / Tolerance for Turning/Repositioning: Tolerates Well

## 2020-11-17 NOTE — PROGRESS NOTES
1808: This RN went to room to hourly round on patient, patient A&Ox1, mumbled and slurred speech, unable to follow commands. Patient last assessed around 1730 and was A&Ox4, with no mumbled speech. Stat paged [facility] to bedside, Rapid Response called. NIH of 7, code stroke initiated, patient taken to CT, and new orders from Dr. Portillo. 1919 Dr. Portillo notified regarding CT of the head results, will put in new orders. 1929 Dr. Quevedo notified regarding CT of the head, will put in orders. Will continue to monitor patient.

## 2020-11-17 NOTE — PROGRESS NOTES
"[facility] -  PROGRESS NOTE               Author: TABATHA Rivera, CNN-NP  Collaborating Physician: Dr. Marvin Mahajan  Date & Time: 11/17/2020  9:53 AM     HPI:  73 yo M PMH ESRD TTS iHD, HTN, type 2 DM, anemia of CKD, and CKD-MBD, CAD s/p stents, IDDM, A. fib, hypothyroidism and GERD who presented to the [facility] ER with worsening toe gangrene. Has undelrying severe and s/p L great toe amputation in may 2020. Followed by vascular surgery outpatient with a repeat balloon angioplasty about 1 month ago. For last several days worse right toe pain with subjective fevers. Last HD on Thursday. No issues with access of electrolytes. In the ED was noted to have sepsis from gangrene toe promting admission.  No chest pain or shortness of breath.  Without fever, chills, sweats    DAILY NEPHROLOGY SUMMARY:  11/14: consult done  11/15: tolerated HD, pending R. Great toe amputation tomorrow   11/16: NAEO, taken down to OR this AM for amputation  11/17: sitting up in bed, feeling fine, just got EEG, had code neuro yesterday for slurred speech, head CT neg for acute changes, still being worked up, thought maybe narcotic related, for HD today     PAST FAMILY HISTORY: Reviewed and Unchanged  SOCIAL HISTORY: Reviewed and Unchanged  CURRENT MEDICATIONS: Reviewed  IMAGING STUDIES: Reviewed    ROS  General: No malaise  CV: No chest pain  RESP: No shortness of breath  GI: No abdominal pain   MSK: +toe pain  All other systems reviewed and negative    PHYSICAL EXAM  VS:  /49   Pulse 98   Temp 36.9 °C (98.5 °F) (Temporal)   Resp 18   Ht 1.676 m (5' 6\")   Wt 84.4 kg (186 lb 1.1 oz)   SpO2 95%   BMI 30.03 kg/m²   GENERAL: no acute distress  CV: RRR, No edema  RESP: non-labored  GI: Soft  MSK: Right foot with dressing at site of amputation  SKIN: No concerning rashes  NEURO: AOx3  PSYCH: Cooperative    Fluids:  In: 200 [I.V.:200]  Out: 25     LABS:  Recent Results (from the past 24 hour(s)) "   Histology Request    Collection Time: 11/16/20 10:51 AM   Result Value Ref Range    Pathology Request Sent to Histo    ACCU-CHEK GLUCOSE    Collection Time: 11/16/20 11:19 AM   Result Value Ref Range    Glucose - Accu-Ck 127 (H) 65 - 99 mg/dL   ACCU-CHEK GLUCOSE    Collection Time: 11/16/20  4:45 PM   Result Value Ref Range    Glucose - Accu-Ck 131 (H) 65 - 99 mg/dL   ACCU-CHEK GLUCOSE    Collection Time: 11/16/20  6:38 PM   Result Value Ref Range    Glucose - Accu-Ck 117 (H) 65 - 99 mg/dL   CBC Without Differential    Collection Time: 11/16/20  7:15 PM   Result Value Ref Range    WBC 13.7 (H) 4.8 - 10.8 K/uL    RBC 2.12 (L) 4.70 - 6.10 M/uL    Hemoglobin 6.9 (L) 14.0 - 18.0 g/dL    Hematocrit 21.3 (L) 42.0 - 52.0 %    .5 (H) 81.4 - 97.8 fL    MCH 32.5 27.0 - 33.0 pg    MCHC 32.4 (L) 33.7 - 35.3 g/dL    RDW 51.6 (H) 35.9 - 50.0 fL    Platelet Count 362 164 - 446 K/uL    MPV 8.8 (L) 9.0 - 12.9 fL   Comp Metabolic Panel    Collection Time: 11/16/20  7:15 PM   Result Value Ref Range    Sodium 129 (L) 135 - 145 mmol/L    Potassium 4.4 3.6 - 5.5 mmol/L    Chloride 91 (L) 96 - 112 mmol/L    Co2 20 20 - 33 mmol/L    Anion Gap 18.0 (H) 7.0 - 16.0    Glucose 136 (H) 65 - 99 mg/dL    Bun 47 (H) 8 - 22 mg/dL    Creatinine 6.41 (HH) 0.50 - 1.40 mg/dL    Calcium 8.8 8.5 - 10.5 mg/dL    AST(SGOT) 6 (L) 12 - 45 U/L    ALT(SGPT) 6 2 - 50 U/L    Alkaline Phosphatase 66 30 - 99 U/L    Total Bilirubin 0.4 0.1 - 1.5 mg/dL    Albumin 2.8 (L) 3.2 - 4.9 g/dL    Total Protein 6.3 6.0 - 8.2 g/dL    Globulin 3.5 1.9 - 3.5 g/dL    A-G Ratio 0.8 g/dL   aPTT    Collection Time: 11/16/20  7:15 PM   Result Value Ref Range    APTT 33.0 24.7 - 36.0 sec   Prothrombin Time    Collection Time: 11/16/20  7:15 PM   Result Value Ref Range    PT 17.5 (H) 12.0 - 14.6 sec    INR 1.39 (H) 0.87 - 1.13   Arterial Blood Gas    Collection Time: 11/16/20  7:15 PM   Result Value Ref Range    Ph 7.42 7.40 - 7.50    Pco2 33.4 26.0 - 37.0 mmHg    Po2 82.5  64.0 - 87.0 mmHg    O2 Saturation 95.3 93.0 - 99.0 %    Hco3 21 17 - 25 mmol/L    Base Excess -3 -4 - 3 mmol/L    Body Temp see below Centigrade    O2 Therapy 2.0 2.0 - 10.0 L/min   TSH WITH REFLEX TO FT4    Collection Time: 20  7:15 PM   Result Value Ref Range    TSH 0.186 (L) 0.380 - 5.330 uIU/mL   VITAMIN B12    Collection Time: 20  7:15 PM   Result Value Ref Range    Vitamin B12 -True Cobalamin 477 211 - 911 pg/mL   T.PALLIDUM AB EIA    Collection Time: 20  7:15 PM   Result Value Ref Range    Syphilis, Treponemal Qual Non-Reactive Non-Reactive   HIV AG/AB COMBO ASSAY DIAGNOSTIC    Collection Time: 20  7:15 PM   Result Value Ref Range    HIV Ag/Ab Combo Assay Non-Reactive Non Reactive   ESTIMATED GFR    Collection Time: 20  7:15 PM   Result Value Ref Range    GFR If African American 10 (A) >60 mL/min/1.73 m 2    GFR If Non  8 (A) >60 mL/min/1.73 m 2   FREE THYROXINE    Collection Time: 20  7:15 PM   Result Value Ref Range    Free T-4 1.71 (H) 0.93 - 1.70 ng/dL   EKG    Collection Time: 20  7:25 PM   Result Value Ref Range    Report       [facility]    Test Date:  2020  Pt Name:    KLARISSA BRADSHAW              Department: 183  MRN:        [id number]                      Room:       T805  Gender:     Male                         Technician: DARREN  :        1945                   Requested By:DANIELLE HEREDIA  Order #:    [id number]                    Reading MD: Effie Argueta MD    Measurements  Intervals                                Axis  Rate:       110                          P:          94  ME:         153                          QRS:        -89  QRSD:       135                          T:          45  QT:         370  QTc:        501    Interpretive Statements  SINUS TACHYCARDIA  RBBB AND LAFB  Compared to ECG 2020 23:21:34  No significant change noted  Electronically Signed On 2020 8:22:48 PST by Effie Argueta MD     CBC WITH  DIFFERENTIAL    Collection Time: 11/17/20 12:01 AM   Result Value Ref Range    WBC 14.5 (H) 4.8 - 10.8 K/uL    RBC 2.02 (L) 4.70 - 6.10 M/uL    Hemoglobin 7.0 (L) 14.0 - 18.0 g/dL    Hematocrit 20.5 (L) 42.0 - 52.0 %    .5 (H) 81.4 - 97.8 fL    MCH 34.7 (H) 27.0 - 33.0 pg    MCHC 34.1 33.7 - 35.3 g/dL    RDW 52.6 (H) 35.9 - 50.0 fL    Platelet Count 347 164 - 446 K/uL    MPV 8.8 (L) 9.0 - 12.9 fL    Neutrophils-Polys 85.90 (H) 44.00 - 72.00 %    Lymphocytes 4.60 (L) 22.00 - 41.00 %    Monocytes 8.30 0.00 - 13.40 %    Eosinophils 0.10 0.00 - 6.90 %    Basophils 0.30 0.00 - 1.80 %    Immature Granulocytes 0.80 0.00 - 0.90 %    Nucleated RBC 0.00 /100 WBC    Neutrophils (Absolute) 12.49 (H) 1.82 - 7.42 K/uL    Lymphs (Absolute) 0.67 (L) 1.00 - 4.80 K/uL    Monos (Absolute) 1.20 (H) 0.00 - 0.85 K/uL    Eos (Absolute) 0.01 0.00 - 0.51 K/uL    Baso (Absolute) 0.04 0.00 - 0.12 K/uL    Immature Granulocytes (abs) 0.12 (H) 0.00 - 0.11 K/uL    NRBC (Absolute) 0.00 K/uL   CBC WITH DIFFERENTIAL    Collection Time: 11/17/20  2:15 AM   Result Value Ref Range    WBC 15.1 (H) 4.8 - 10.8 K/uL    RBC 2.15 (L) 4.70 - 6.10 M/uL    Hemoglobin 7.1 (L) 14.0 - 18.0 g/dL    Hematocrit 21.8 (L) 42.0 - 52.0 %    .4 (H) 81.4 - 97.8 fL    MCH 33.0 27.0 - 33.0 pg    MCHC 32.6 (L) 33.7 - 35.3 g/dL    RDW 52.4 (H) 35.9 - 50.0 fL    Platelet Count 380 164 - 446 K/uL    MPV 8.8 (L) 9.0 - 12.9 fL    Neutrophils-Polys 83.60 (H) 44.00 - 72.00 %    Lymphocytes 5.90 (L) 22.00 - 41.00 %    Monocytes 9.10 0.00 - 13.40 %    Eosinophils 0.10 0.00 - 6.90 %    Basophils 0.20 0.00 - 1.80 %    Immature Granulocytes 1.10 (H) 0.00 - 0.90 %    Nucleated RBC 0.00 /100 WBC    Neutrophils (Absolute) 12.60 (H) 1.82 - 7.42 K/uL    Lymphs (Absolute) 0.89 (L) 1.00 - 4.80 K/uL    Monos (Absolute) 1.37 (H) 0.00 - 0.85 K/uL    Eos (Absolute) 0.01 0.00 - 0.51 K/uL    Baso (Absolute) 0.03 0.00 - 0.12 K/uL    Immature Granulocytes (abs) 0.17 (H) 0.00 - 0.11 K/uL     NRBC (Absolute) 0.00 K/uL   Comp Metabolic Panel    Collection Time: 11/17/20  2:15 AM   Result Value Ref Range    Sodium 131 (L) 135 - 145 mmol/L    Potassium 4.5 3.6 - 5.5 mmol/L    Chloride 91 (L) 96 - 112 mmol/L    Co2 23 20 - 33 mmol/L    Anion Gap 17.0 (H) 7.0 - 16.0    Glucose 127 (H) 65 - 99 mg/dL    Bun 50 (H) 8 - 22 mg/dL    Creatinine 7.09 (HH) 0.50 - 1.40 mg/dL    Calcium 9.0 8.5 - 10.5 mg/dL    AST(SGOT) 6 (L) 12 - 45 U/L    ALT(SGPT) <5 2 - 50 U/L    Alkaline Phosphatase 68 30 - 99 U/L    Total Bilirubin 0.4 0.1 - 1.5 mg/dL    Albumin 3.2 3.2 - 4.9 g/dL    Total Protein 6.5 6.0 - 8.2 g/dL    Globulin 3.3 1.9 - 3.5 g/dL    A-G Ratio 1.0 g/dL   ESTIMATED GFR    Collection Time: 11/17/20  2:15 AM   Result Value Ref Range    GFR If  9 (A) >60 mL/min/1.73 m 2    GFR If Non  8 (A) >60 mL/min/1.73 m 2   AMMONIA    Collection Time: 11/17/20  5:53 AM   Result Value Ref Range    Ammonia 14 11 - 45 umol/L   ACCU-CHEK GLUCOSE    Collection Time: 11/17/20  6:36 AM   Result Value Ref Range    Glucose - Accu-Ck 107 (H) 65 - 99 mg/dL       (click the triangle to expand results)    ASSESSMENT:  # ESRD    TTS iHD @ [facility]     Via R CVC       Has immature LUE AVF - follow up as outpt at [facility]   # R. Foot Gangrene   S/p amputation   # Sepsis    Secondary to gangrene    Vanco goal in ESRD 15-20  # HTN, controlled   # Anemia of CKD   Hgb goal 10-11   Iron deplete but ferritin level precludes IV iron   ARMANDO with HD   # CKD-MBD    Managed at HD unit   Continue phoslo, calcitriol   # HLD  # CAD  # Hyponatremia, mild   Manage with HD     PLAN:  - TTS iHD  - UF as tolerated  - Abx per primary  - Dose meds for ESRD  - Continue home PO4 binders  - ARMANDO with HD  - Neuro work up per primary team   - Okay to transition to outpt hemodialysis when medically cleared

## 2020-11-17 NOTE — PROGRESS NOTES
"Daily Progress Note:     Date of Service: 11/17/2020  Primary Team: [facility] IM White Team   Attending: Harris Clay M.D.   Senior Resident: Dr. Sommers  Intern: Dr. Saldana  Contact:  [phone]    ID Statement:  74 year old man with history of PVD, DM2, ESRD on dialysis,  and prior amputations presenting with a R great toe gangrenous necrosis    Subjective:  -Was noted to have altered mentation and slurred speech with NIH stroke scale of 7 at around 1800 yesterday, after being observed at baseline at 1730. CT cerebral perfusion and CT head imaging of head negative for acute changes. Neurology was contacted overnight, labs were drawn  -This AM, patient is back to baseline, he notes that he has had a similar episode to this occurred \"a few years ago\" that resolved spontaneously  -Had an EEG preformed this AM  -Had R great toe amputation yesterday, no complications during procedure  -Complains of mild pain over his amputation site, says that his pain meds help with symptoms   -Hgb dropped from 7.9 on 11/16 0327 to 6.9 on 11/16 1915, patient denies any hematemesis or melena, denies any feelings of lightheadedness  -Had a febrile episode at around 2100, last night, BCs drawn, patient denies any fevers/ chills    Interval Updates:  -CT imaging of brain done overnight  -EEG preformed on 11/17  -Labs drawn overnight: B12, TSH, Free T4, CMP, BCx2, Urinalysis, CBC, FOBT, Ammonia  -Restarted Heparin this AM     Consultants/Specialty:  Orthopedics  Nephrology  Neurology  ID    Review of Systems:    Review of Systems   Constitutional: Negative for chills, diaphoresis, fever and malaise/fatigue.   HENT: Negative for hearing loss.    Eyes: Negative for blurred vision and double vision.   Respiratory: Negative for cough, sputum production and shortness of breath.    Cardiovascular: Negative for chest pain, palpitations, orthopnea, leg swelling and PND.   Gastrointestinal: Negative for abdominal pain, blood in stool, constipation, " diarrhea, heartburn, melena, nausea and vomiting.   Genitourinary: Negative for dysuria, frequency, hematuria and urgency.   Musculoskeletal: Negative for falls and myalgias.        Right great toe wound with clear drainage   Skin: Negative for rash.   Neurological: Negative for dizziness, tremors, sensory change, speech change, focal weakness, seizures, loss of consciousness, weakness and headaches.   Psychiatric/Behavioral: Negative for depression, memory loss and suicidal ideas.       Objective Data:   Physical Exam:   Vitals:   Temp:  [36.8 °C (98.2 °F)-38.8 °C (101.9 °F)] 36.9 °C (98.5 °F)  Pulse:  [] 98  Resp:  [16-20] 18  BP: (111-152)/(48-87) 114/49  SpO2:  [95 %-100 %] 95 %     Physical Exam  Vitals signs and nursing note reviewed.   Constitutional:       General: He is not in acute distress.     Appearance: Normal appearance. He is not diaphoretic.   HENT:      Head: Normocephalic and atraumatic.      Nose: Nose normal.      Mouth/Throat:      Mouth: Mucous membranes are moist.   Eyes:      General: No scleral icterus.     Extraocular Movements: Extraocular movements intact.      Conjunctiva/sclera: Conjunctivae normal.      Pupils: Pupils are equal, round, and reactive to light.   Neck:      Musculoskeletal: Neck supple. No neck rigidity.   Cardiovascular:      Rate and Rhythm: Normal rate and regular rhythm.      Pulses: Normal pulses.      Heart sounds: No murmur.   Pulmonary:      Effort: Pulmonary effort is normal. No respiratory distress.      Breath sounds: No wheezing.   Abdominal:      General: Abdomen is flat. Bowel sounds are normal. There is no distension.      Palpations: Abdomen is soft. There is no mass.      Tenderness: There is no abdominal tenderness. There is no guarding.   Musculoskeletal:      Right lower leg: No edema.      Left lower leg: No edema.      Comments: R great toe s/p amputation, wrapped in bandages, no discharge or fluid leakage from surgical site  Left foot s/p L  great toe amputation, no wound over surgical site   Skin:     General: Skin is warm.      Capillary Refill: Capillary refill takes less than 2 seconds.      Comments: Mild erythema around great toe, toe covered in antiseptic fluid   Neurological:      Mental Status: He is alert and oriented to person, place, and time.      Cranial Nerves: Cranial nerves are intact. No cranial nerve deficit, dysarthria or facial asymmetry.      Sensory: Sensation is intact.      Motor: No weakness.      Coordination: Finger-Nose-Finger Test and Heel to Shin Test normal.      Deep Tendon Reflexes:      Reflex Scores:       Tricep reflexes are 2+ on the right side and 2+ on the left side.       Patellar reflexes are 2+ on the right side and 2+ on the left side.     Comments: 5/5 Bilateral strength on UE and LEs   Psychiatric:         Mood and Affect: Mood normal.         Behavior: Behavior normal.         Thought Content: Thought content normal.         Labs:   Results for SEGUN BRADSHAW (MRN [id number]) as of 11/17/2020 14:08   Ref. Range 11/17/2020 00:01 11/17/2020 02:15 11/17/2020 05:53 11/17/2020 06:36 11/17/2020 11:35   WBC Latest Ref Range: 4.8 - 10.8 K/uL 14.5 (H) 15.1 (H)      RBC Latest Ref Range: 4.70 - 6.10 M/uL 2.02 (L) 2.15 (L)      Hemoglobin Latest Ref Range: 14.0 - 18.0 g/dL 7.0 (L) 7.1 (L)      Hematocrit Latest Ref Range: 42.0 - 52.0 % 20.5 (L) 21.8 (L)      MCV Latest Ref Range: 81.4 - 97.8 fL 101.5 (H) 101.4 (H)      MCH Latest Ref Range: 27.0 - 33.0 pg 34.7 (H) 33.0      MCHC Latest Ref Range: 33.7 - 35.3 g/dL 34.1 32.6 (L)      RDW Latest Ref Range: 35.9 - 50.0 fL 52.6 (H) 52.4 (H)      Platelet Count Latest Ref Range: 164 - 446 K/uL 347 380      MPV Latest Ref Range: 9.0 - 12.9 fL 8.8 (L) 8.8 (L)      Neutrophils-Polys Latest Ref Range: 44.00 - 72.00 % 85.90 (H) 83.60 (H)      Neutrophils (Absolute) Latest Ref Range: 1.82 - 7.42 K/uL 12.49 (H) 12.60 (H)      Lymphocytes Latest Ref Range: 22.00 - 41.00 % 4.60  "(L) 5.90 (L)      Lymphs (Absolute) Latest Ref Range: 1.00 - 4.80 K/uL 0.67 (L) 0.89 (L)      Monocytes Latest Ref Range: 0.00 - 13.40 % 8.30 9.10      Monos (Absolute) Latest Ref Range: 0.00 - 0.85 K/uL 1.20 (H) 1.37 (H)      Eosinophils Latest Ref Range: 0.00 - 6.90 % 0.10 0.10      Eos (Absolute) Latest Ref Range: 0.00 - 0.51 K/uL 0.01 0.01      Basophils Latest Ref Range: 0.00 - 1.80 % 0.30 0.20      Baso (Absolute) Latest Ref Range: 0.00 - 0.12 K/uL 0.04 0.03      Immature Granulocytes Latest Ref Range: 0.00 - 0.90 % 0.80 1.10 (H)      Immature Granulocytes (abs) Latest Ref Range: 0.00 - 0.11 K/uL 0.12 (H) 0.17 (H)      Nucleated RBC Latest Units: /100 WBC 0.00 0.00      NRBC (Absolute) Latest Units: K/uL 0.00 0.00      Sodium Latest Ref Range: 135 - 145 mmol/L  131 (L)      Potassium Latest Ref Range: 3.6 - 5.5 mmol/L  4.5      Chloride Latest Ref Range: 96 - 112 mmol/L  91 (L)      Co2 Latest Ref Range: 20 - 33 mmol/L  23      Anion Gap Latest Ref Range: 7.0 - 16.0   17.0 (H)      Glucose Latest Ref Range: 65 - 99 mg/dL  127 (H)      Bun Latest Ref Range: 8 - 22 mg/dL  50 (H)      Creatinine Latest Ref Range: 0.50 - 1.40 mg/dL  7.09 (HH)      GFR If  Latest Ref Range: >60 mL/min/1.73 m 2  9 (A)      GFR If Non  Latest Ref Range: >60 mL/min/1.73 m 2  8 (A)      Calcium Latest Ref Range: 8.5 - 10.5 mg/dL  9.0      AST(SGOT) Latest Ref Range: 12 - 45 U/L  6 (L)      ALT(SGPT) Latest Ref Range: 2 - 50 U/L  <5      Alkaline Phosphatase Latest Ref Range: 30 - 99 U/L  68      Total Bilirubin Latest Ref Range: 0.1 - 1.5 mg/dL  0.4      Albumin Latest Ref Range: 3.2 - 4.9 g/dL  3.2      Total Protein Latest Ref Range: 6.0 - 8.2 g/dL  6.5      Globulin Latest Ref Range: 1.9 - 3.5 g/dL  3.3      A-G Ratio Latest Units: g/dL  1.0      Ammonia Latest Ref Range: 11 - 45 umol/L   14     Glucose - Accu-Ck Latest Ref Range: 65 - 99 mg/dL    107 (H) 125 (H)     Imaging:   CT Head  \"NO ACUTE " "ABNORMALITIES ARE NOTED ON CT SCAN OF THE HEAD.  Findings are consistent with atrophy.  Decreased attenuation in the periventricular white matter likely indicates microvascular ischemic disease.\"    CT-CTA head w/ and w/o post processing \"CT angiogram of the [place] within normal limits.\"     \"1.  No significant stenosis or occlusion identified. Diameter reduction in each ICA is between 0% and 49%.     2.  Noncalcified atherosclerotic plaque causing narrowing in the left common carotid artery is again identified.\"    CT Cerebral perfusion \"1.  Cerebral blood flow less than 30% likely representing completed infarct = 0 mL.     2.  T Max more than 6 seconds likely representing combination of completed infarct and ischemia = 0 mL.     3.  Mismatched volume likely representing ischemic brain/penumbra = None     4.  Please note that the cerebral perfusion was performed on the limited brain tissue around the basal ganglia region. Infarct/ischemia outside the CT perfusion sections can be missed in this study.\"    Problem Representation: 74 year old male with PMH notable for PVD s/p left great toe amputation, DM2, ESRD on dialysis, CAD s/p stenting, who presented for a 2 month history of right great toe wound, s/p amputation on 11/16, care complicated by episode of AMS on 11/16 PM with negative head imagining, now resolved spontaneously, awaiting EEG results.    * Wound of right foot- (present on admission)  Assessment & Plan  -History of right great toe wound for 2 months that has had acute exacerbation with increased pain for last 3 days with clear liquid drainage  -Followed by vascular surgery outpatient s/p revascularization of RLE about 1 month ago  -Seen in office with vascular yesterday that confirmed his vascular flow is good in RLE, but referred patient to ED  -Afebrile and HD stable.  -Leukocytosis, ESR >120, CRP 24.4.   -Normal lactic acid and CPK  -FXR: shows evidence of gas in the tissue, however " negative for osteomyelitis, fracture, or dislocation  -Taken for amputation of R great toe on 11/16/2020  IV Vancomycin and IV Zosyn stopped on 11/17, exchanged for Cephalexin    Plan:  -Orthopedics following  -Holding home Eliquis and Plavix, Heparin for VTE ppx  -Pain control  -Abx:  Cephalexin 500mg Q daily (No manufactory recommendations for renal dosing on patient on hemodialysis, however some guidelines suggest 250-500mg every 12-24 hours as an adjustment)  -Blood cultures NGTD  -Wound care consult  -Will follow amputation pathology histo; no evidence of osteomyelitis   -ID Consult    Altered mental status  Assessment & Plan  -Noted to have altered mentation, difficulty following commands, and slurred speech at around 1800 on 11/16, was seen at 1730 and said deficits were not noted at that time  -On the AM of 11/17, patient is back to baseline with previously noted deficits now resolved.   -Unclear etiology, likely not stroke as workup has been negative  -Likely not secondary to hyponatremia; patient is only mildly hyponatremic and only changed from 131 to 129 between AM and PM labs of the date of episode  -B12 and Ammonia within reference range, Free T4 mildly elevated  -Unlikely due to uremia as BUN increased from 38 to 47; not a very major increase  -Possibly secondary to seizure activity given reported history of a similar episode in the past.  Plan  -Pending EEG results    Peripheral vascular disease (HCC)- (present on admission)  Assessment & Plan  -Followed by vascular surgery outpatient  -S/p revascularization of RLE about 1 month ago  -Seen in vascular office yesterday and told blood flow is doing well in RLE, but referred to ED for antibiotics and further evaluation  -Pulses heard on doppler exam in ED    Plan:    -On ASA 81 daily  -Continue home Atorvastatin 40    End stage renal disease on dialysis (HCC)- (present on admission)  Assessment & Plan  -On [facility] hemodialysis outpatient  -Had Dialysis on  11/15  -Nephrology following  -Continue home Phos-lo for renal osteodystrophy    Anemia of chronic disease- (present on admission)  Assessment & Plan  -Secondary to ESRD  -Receiving epoietin.  -Ferritin >500. No iron indicated. Could be acute phase reactant as well.  -Hemoglobin dropped from 7.9 to 6.9 from the AM to PM of 11/16  -FOBT ordered on 11/16  -Will continue to monitor    Chest pain  Assessment & Plan  -History of intermittent chest pain, non-radiating, intermittent, self resolving, lasting about 20-30 seconds  -EKG sinus rhythm with RBBB and LAFB, no ST changes concerning for ischemia or infarct  -No active chest pain at this time  -Troponin 142 --> 131  -EKG and stat troponin PRN if chest pain is to recur    Paroxysmal A-fib (HCC)- (present on admission)  Assessment & Plan  -In sinus rhythm on admission  -Rate is controlled. Coreg 3.125 bid  -Holding Eliquis for possible additional procedures    Secondary hyperparathyroidism of renal origin (HCC)- (present on admission)  Assessment & Plan  -Continue home Calcitriol    Diabetes mellitus type 2 in nonobese (HCC)- (present on admission)  Assessment & Plan  -Last A1c 04/2020: 5.8 now 7.8  -Likely contributing to lower extremity toe gangrene  -On Lantus 5 units at home  -Holding home Lantus  -SSI  -Hypoglycemic protocol  -Outpatient follow up needed    Dyslipidemia- (present on admission)  Assessment & Plan  -Continue home Atorvastatin    Gastroesophageal reflux disease- (present on admission)  Assessment & Plan  -Continue home Protonix 40 mg     Essential hypertension- (present on admission)  Assessment & Plan  -Continue home Coreg 3.125 mg BID    CAD (coronary artery disease)- (present on admission)  Assessment & Plan  -s/p LAD stent on Plavix at home   -Continuing home Atorvastatin  -Holding Plavix in case patient may need additional procedures    Hyperthyroidism- (present on admission)  Assessment & Plan  -Continue home Methimazole  -Last TSH was 10.0  with normal FT4  -Repeat TSH now low        Quality Measures:  Code: FULL  VTE: Heparin  Diet: Renal  Disposition: Inpatient

## 2020-11-17 NOTE — PROGRESS NOTES
Assumed care of Pt, He is sitting up in bed. Discussed POC with day shift RN at bedside. Bed is locked in lowest position and call light/ belongings are within reach.    Pt went to CT shortly after.

## 2020-11-17 NOTE — RESPIRATORY CARE
Respiratory Rapid Response Note    Symptoms stroke symptoms per RN        O2 (LPM): 2 (11/16/20 3230)       Events/Summary/Plan: Rapid response, no respiratory interventions needed at this time. (11/16/20 1338)

## 2020-11-17 NOTE — PROGRESS NOTES
"73 yo M PMH ESRD TTS IHD, HTN, type 2 DM, anemia of CKD, CAD s/p stents, IDDM, A. fib, hypothyroidism and GERD who presented to the [facility] with worsening toe gangrene. He underwent first toe amputation this morning     Received a page at 6:14 PM requesting that patient be seen right away.  Per nursing patient was noted to have altered mentation and slurred speech at 6 PM. At this time NIH stroke scale was recorded as 7, for slurred speech,  Ataxia of lower extremities, decreased sensation and poor discrepancy of sharp and dull sensation along left side, and word finding impairment. Per nursing, last known normal was 5:30 PM.  Patient was see at bedside at 6:30 PM.  Per patient's ex- wife, who was at bedside patient appeared more confused than baseline and noted slurred speech. She states that tight now she seemed better  than he did at 6 PM  She recalled that a similar episode occurred once before and resolved spontaneously.      Patient was awake and alert and mildly irritated when he was first seen, stating \" I'm fine F-I-N-E and hesitated a bit when spelling. He denied any headache, dizziness, chest pain,  or shortness of breath.       Vitals: 18:23 PM  /87    RR 16  PO2 100% on 2L     Physical Exam :   General: awake and alert, in no distress on room air  Neuro: A&O4. CN II-XII intact. Strength 5/5 in all 4 extremities. Per patient, sensation is \"different\" on left side, though patient can sense pressure. Patient able to complete finger to nose test and heel to shin test. Patient appeared to have some trouble with commands referring to right versus left side. Patient also presented with some word finding difficulties when identifying images, however was able to read various phrases.   Heart: RRR no murmurs  Lungs: clear. Diminished at bases  Abdomen,.  Soft, non-tender, non distended   Skin: right subclavian portacath with no sign of active bleed  Ext: faint pulses along lower extremities. Bandage " around left foot with no signs of active bleed. Missing 1st digit on left foot  Psych:  mildly irritated, impaired attention and concentration    Reassessed aptient around 9 PM. He was somnolent and awakened from sleep therefore exam was limited.  He had  trouble distinguishing right from left, and trouble following some commands, however exhibited bilateral 5/5  Strength.     No bleeding around foot orright subclavian port.         Assessment/ Plan     #Acute change in mental status    Upon initial evaluation there was high suspicion for CVA due to NIH score of 7. Neurology was consulted when code stroke was called at 6 PM. Glucose at bedside was 101. NIH stroke scale upon reassessment around 6:40 PM was rated at 4 (1 for diminished sensation, 1 for mild aphasia, 1 for mild dysarthria and 1 for inattention) from 7 at 6 PM . Reviewed medications that may result in altered mental status.  Patient received compazine at 8 AM Morphine 4 mg at 7AM and 3 PM and oxycodone 10mg at 11AM and 5 PM. There is high likelyhood that narcotics contributed to this presentation and Oxycodone 10mg and morphine were discontinued.  Oxycodone 5 gm Q4H was continued as needed for pain. Repeat labs were also ordered as prior labs were significant for Na of 130,  BUN was only 47 after dialysis and vancomycin trough was slightly supratherapeutic at 24.5. TSH was 0.186,  Free T4 slightly elevated at 1.71, from 1.77 on 11/14. Hyperthyroidism unlikely contributing to presentation and Na of 130 may contribute to altered mentation, however such a presentation is not typical at these levels. There is also a possibility that patient is encephalopathic from infection, however Bcx from 11/13 was negative  - f/u EEG  - Neurochecks S3sdgoy  - Check B1 HIV, RPR and ammonia levels  - repeat blood cultures and UA reflex to Ucx  - MRI and neuro consult in the AM if neuro deficits persist    # Fever and leukocytosis  - WBC count slightly elevated at 13.7-  likely reactive from surgery  -Temp taken at 20:27 was 101.9 F this evening  - Patient currently on Vacomycin and Zosyn  - Wound cultures pending  - Repeat Bcx    # Acute on chronic Anemia  -  Hgb fell from 7.9 to 6.9 this evening.   -  minimal blood loss during toe amputation, unlikely to contributing to blood loss.  No Bleeding around dialysis port site  - Eliquis held since 11/15  - This may be partially dilutional in the setting of concurrent hyponatremia and weight gain  - repeat CBC in AM  - FOBT    # Hyponatremia   - Na 129 today from 134  - Patient appears euvolemic,  - Patient is net +280 cc per chart review, however has been taking in more water over the past 2 days  And gained 3 Kg in 3 days  - Will await nephro recommendations in the AM as patient has been dialyzed today

## 2020-11-17 NOTE — PROGRESS NOTES
CODE STROKE    A code stroke was called 11/16/20    CTP and CTA negative for vascular pathology, thus not a candidate for acute stroke intervention    Note, the patient is hyponatremic which can precipitate numerous neurological symptoms. Do not correct more than 8mEq in a 24hr period.    Workup for altered mental status: TSH, B12, thiamine, RPR, HIV, ammonia, UA, utox, EEG    Please page neurology if there is an additional inquiry or if formal consultation is requested.    Dat Portillo MD  Neurohospitalist, Acute Care Services   of Neurology

## 2020-11-17 NOTE — PROCEDURES
ROUTINE ELECTROENCEPHALOGRAM REPORT      Referring provider: Dr. Portillo.     DOS: 11/17/2020 (total recording of 25 minutes)    INDICATION:  Luis Sepulveda 74 y.o. male presenting with altered mental status, dysarthria.    CURRENT ANTIEPILEPTIC REGIMEN: None.    TECHNIQUE: 30 channel routine electroencephalogram (EEG) was performed in accordance with the international 10-20 system. The study was reviewed in bipolar and referential montages. The recording examined the patient during wakeful and drowsy state(s).     DESCRIPTION OF THE RECORD:  During the wakefulness, the background showed a symmetrical 6 Hz theta activity posteriorly with amplitude of 70 mV.  There was reactivity to eye closure/opening.  A normal anterior-posterior gradient was noted with faster beta frequencies seen anteriorly.  During drowsiness, theta/delta frequencies were seen.    ACTIVATION PROCEDURES:   Intermittent Photic stimulation was performed in a stepwise fashion from 1 to 30 Hz, but failed to produce any background changes.    ICTAL AND/OR INTERICTAL FINDINGS:   Rare triphasic waves noted.  No regional slowing was seen during this routine study.  No seizures were reported or recorded during the study.     EKG: sampling of the EKG recording demonstrated sinus rhythm.       INTERPRETATION:  This is an abnormal routine EEG recording in the awake and drowsy states.  A mild to moderate, toxic/metabolic encephalopathy is suggested.  No seizures were captured during the study.  Clinical correlation is recommended.    Note: This EEG does not rule out epilepsy.  If the clinical suspicion remains high for seizures, a prolonged recording to capture clinical or subclinical events may be helpful.        Scott Ibrahim MD   Epilepsy and Neurodiagnostics.   Clinical  of Neurology [facility].   Diplomate in Neurology, Epilepsy, and Electrodiagnostic Medicine.   Office:  [phone]  Fax: [phone]

## 2020-11-17 NOTE — PROGRESS NOTES
"Pharmacy Kinetics 74 y.o. male on vancomycin day # 4 11/17/2020    Currently Dose: Vancomycin pulse dosing  11/14/20 3250 mg   11/14/20 VR 15.3 mcg/mL  11/16/20 VR 24.5 mcg/mL      Indication for Treatment: SSTI  Provider Specified End Date: 11/19/20  ID Service Following: No     Pertinent history per medical record: Admitted on 11/13/2020 for concern of SSTI of RLE in setting of DM. Orthopedics consulted with plans for surgical intervention.      Other antibiotics: cephalexin 500 mg po q24h     Allergies: Mircera [methoxy polyethylene glycol-epoetin beta]      List concerns for accumulation of vancomycin: ESRD iHD TTS     Pertinent cultures to date:    Results     Procedure Component Value Units Date/Time    BLOOD CULTURE [[id number]] Collected: 11/17/20 0056    Order Status: Completed Specimen: Blood from Peripheral Updated: 11/17/20 0106    Narrative:      Per Hospital Policy: Only change Specimen Src: to \"Line\" if  specified by physician order.    BLOOD CULTURE [[id number]] Collected: 11/17/20 0001    Order Status: Completed Specimen: Blood from Peripheral Updated: 11/17/20 0048    Narrative:      Per Hospital Policy: Only change Specimen Src: to \"Line\" if  specified by physician order.    URINALYSIS [[id number]]     Order Status: No result Specimen: Urine, Clean Catch     URINALYSIS [[id number]]     Order Status: No result Specimen: Urine, Clean Catch     BLOOD CULTURE [[id number]] Collected: 11/13/20 2240    Order Status: Completed Specimen: Blood from Peripheral Updated: 11/15/20 0725     Significant Indicator NEG     Source BLD     Site PERIPHERAL     Culture Result No Growth  Note: Blood cultures are incubated for 5 days and  are monitored continuously.Positive blood cultures  are called to the RN and reported as soon as  they are identified.      Narrative:      Droplet, Contact, and Eye Protection  2 of 2 blood culture x2  Sites order. Per Hospital Policy:  Only change Specimen Src: to \"Line\" if specified by " "physician  order.  Right Wrist    BLOOD CULTURE [[id number]] Collected: 11/13/20 2228    Order Status: Completed Specimen: Blood from Peripheral Updated: 11/15/20 0725     Significant Indicator NEG     Source BLD     Site PERIPHERAL     Culture Result No Growth  Note: Blood cultures are incubated for 5 days and  are monitored continuously.Positive blood cultures  are called to the RN and reported as soon as  they are identified.      Narrative:      Droplet, Contact, and Eye Protection  1 of 2 for Blood Culture x 2 sites order. Per Hospital  Policy: Only change Specimen Src: to \"Line\" if specified by  physician order.  Right Forearm/Arm    CoV-2, Flu A/B, And RSV by PCR [[id number]] Collected: 11/13/20 2248    Order Status: Completed Updated: 11/14/20 0028     Influenza virus A RNA Negative     Influenza virus B, PCR Negative     RSV, PCR Negative     SARS-CoV-2 by PCR NotDetected     Comment: PATIENTS: Important information regarding your results and instructions can  be found at [email or web address]   \"After your  Covid-19 Test\"  [facility] providers: PLEASE REFER TO DE-ESCALATION AND RETESTING PROTOCOL  on [email or web address]  **The [employer] GeneXpert Xpress SARS-CoV-2 Test has been made available for  use under the Emergency Use Authorization (EUA) only.          SARS-CoV-2 Source NP Swab    Narrative:      Droplet, Contact, and Eye Protection  Rule-out COVID-19 panel, includes droplet/contact/eye  isolation order.  Check influenza to order this test only if  specifically indicated.  Is patient being admitted?->Yes  Does this patient meet criteria for Rush/Cepheid per [facility]  Inpatient Workflow? (See workflow link below)->Yes  Expected turn around time?->Rush (Cepheid 2-4 hours)  Is this the patients First SARS CoV-2 test?->No  Is this patient employed in healthcare?->No  Is the patient symptomatic as defined by the CDC?->Yes  Date of symptom onset?->11/12/20  Is the patient " hospitalized?->No  Is the patient a resident in a congregate care setting?->No  Is the patient pregnant?->No    COVID/SARS CoV-2 PCR [[id number]] Collected: 11/13/20 2248    Order Status: Completed Specimen: Respirate from Nasopharyngeal Updated: 11/13/20 2301     COVID Order Status Received     Comment: The order for SARS CoV-2 testing has been received by the  Laboratory. This result is neither positive nor negative.  Final results of testing will report in 24-48 hours, separately.         Narrative:      Droplet, Contact, and Eye Protection  Rule-out COVID-19 panel, includes droplet/contact/eye  isolation order.  Check influenza to order this test only if  specifically indicated.  Is patient being admitted?->Yes  Does this patient meet criteria for Rush/Cepheid per [facility]  Inpatient Workflow? (See workflow link below)->Yes  Expected turn around time?->Rush (Cepheid 2-4 hours)  Is this the patients First SARS CoV-2 test?->No  Is this patient employed in healthcare?->No  Is the patient symptomatic as defined by the CDC?->Yes  Date of symptom onset?->11/12/20  Is the patient hospitalized?->No  Is the patient a resident in a congregate care setting?->No  Is the patient pregnant?->No    URINALYSIS [[id number]]     Order Status: Canceled Specimen: Urine         MRSA nares swab if pneumonia is a concern (ordered/positive/negative/n-a): n/a    Recent Labs     11/15/20  0238 11/16/20  0327 11/16/20 1915 11/17/20  0001 11/17/20  0215   WBC 12.2* 13.0* 13.7* 14.5* 15.1*   NEUTSPOLYS 81.80* 81.20*  --  85.90* 83.60*     Recent Labs     11/15/20  0238 11/16/20  0327 11/16/20 1915 11/17/20  0215   BUN 25* 38* 47* 50*   CREATININE 4.06* 5.92* 6.41* 7.09*   ALBUMIN 3.4 3.3 2.8* 3.2     Recent Labs     11/14/20  1249 11/16/20 0327   [facility] 15.3 24.5*     Intake/Output Summary (Last 24 hours) at 11/17/2020 0744  Last data filed at 11/16/2020 0953  Gross per 24 hour   Intake 200 ml   Output 25 ml   Net 175 ml      /49   " Pulse 98   Temp 36.9 °C (98.5 °F) (Temporal)   Resp 18   Ht 1.676 m (5' 6\")   Wt 84.4 kg (186 lb 1.1 oz)   SpO2 95%  Temp (24hrs), Av.1 °C (98.7 °F), Min:36.8 °C (98.2 °F), Max:38.8 °C (101.9 °F)    Estimated Creatinine Clearance: 9 mL/min (A) (by C-G formula based on SCr of 7.09 mg/dL ()).    A/P   1. Vancomycin dose change: not indicated   2. Next vancomycin level: 20 @0300 (ordered)  3. Goal trough: 10-15 mcg/mL  4. Comments: VS stable. Febrile to Tmax 101.9 °F. WBC elevated/increased and recently OR intervention 20 noted. Microbiology pending. ESRD iHD planned //. Most recent vancomycin level above goal. Anticipate minimal vancomycin clearance outside of dialysis. Vancomycin with AM labs prior to 20 iHD session to assess supplemental dose needs. Pharmacy will continue to follow.    Tevin Leyva, PharmD  "

## 2020-11-17 NOTE — ASSESSMENT & PLAN NOTE
-Noted to have altered mentation, difficulty following commands, and slurred speech at around 1800 on 11/16, was seen at 1730 and said deficits were not noted at that time  -On the AM of 11/17, patient is back to baseline with previously noted deficits now resolved.   -Unclear etiology, likely not stroke as workup has been negative  -Likely not secondary to hyponatremia; patient is only mildly hyponatremic and only changed from 131 to 129 between AM and PM labs of the date of episode  -B12 and Ammonia within reference range, Free T4 mildly elevated  -Unlikely due to uremia as BUN increased from 38 to 47; not a very major increase  -Possibly secondary to seizure activity given reported history of a similar episode in the past.  -EEG: abnormal with possible toxic/metabolic encephalopathy, unclear how patient's baseline electrolyte abnormalities play into readings  -Likely secondary to anesthesia/ pain med's given that symptoms have not recurred since decreasing pain meds   -Will continue to monitor

## 2020-11-18 NOTE — DISCHARGE PLANNING
Anticipated Discharge Disposition: TBD    Action: LSW met with patient at bedside. Pt states he is independent and has not used DME in the past. Patient reports that he lives alone but ex-wife and son are staying with him to help with medical needs. Pt states he has medicare, [employer] and has [employer] benefits.    LSW explained that PT and OT will make recommendations for him to d/c. Pt verbalizes understanding. Pt prefers to d/c but understands that that may not be the safest option for him.     Barriers to Discharge: d/c recommendations by PT OT    Plan: LSW to follow and assist as needed

## 2020-11-18 NOTE — PROGRESS NOTES
" [facility]   POST SURGICAL PROGRESS NOTE      HPI:  74 y.o. male with past medical history that includes  diabetes,  L great toe amputation with tenotomy , PVD, CKD on dialysis, CHF, CAD with stents and anticoagulation, admitted 11/13/2020 for Diabetic foot infection (HCC).  An LPS consult has been requested for evaluation of R great toe and L plantar foot callus.  This wound started about 10/14/2020.  He has been treating the wound with betadine.    Patient is established with Dr. Encinas.  Recently had atherectomy about 3 weeks prior to this admission at [facility] vascular.  Per patient, Dr. Encinas reports that patient has all vessels open to the foot.  Last saw Dr. Encinas on 11/13/2020.  Gangrene to right great toe started to worsen.  Patient followed up with Dr. Lynn and was recommended to come to ED for antibiotic therapy and toe amputation.      SURGERY DATE: 11/16/2020 by Dr. Lynn  PROCEDURE: 1.  Right foot irrigation and debridement, multiple compartments.  2.  Right first ray amputation.  3.    Right metatarsal bone biopsy.      INTERVAL HISTORY:  11/18/2020: POD #2.  Patient denies fevers, chills, nausea, vomiting.  Pain well controlled.       PERTINENT LPS RESULTS:   Pathology 11/16/2020:  First metatarsal bone:          Unremarkable bone and cartilage with no evidence of acute           osteomyelitis.       SURGICAL SITE EXAM:      /47   Pulse 80   Temp 37 °C (98.6 °F) (Temporal)   Resp 20   Ht 1.676 m (5' 6\")   Wt 81 kg (178 lb 9.2 oz)   SpO2 90%   BMI 28.82 kg/m²     Pedal Pulses: Unable to palpate pedal pulses.  With Doppler biphasic tones noted to PT/DP bilaterally  Sensation: Insensate  Surgical foot warm    Right great toe amputation  Incision well approximated, sutures intact except to middle of incision where there is slight dehiscence.  Area measures 0.5 x 0.5 x 0.2 cm.   Minimal erythema  Minimal edema  Minimal old bloody drainage  Maceration to plantar first " "MTH  Area measures 5 x 2.5 cm  Gray dark discoloration              Wound care: Completed by APRN.  Applied single layer Adaptic, dry gauze, roll gauze, Ace wrap.      DIABETES MANAGEMENT:    Blood glucose:   Results from last 7 days   Lab Units 11/18/20  1139 11/18/20  0623 11/17/20  1659 11/17/20  1135 11/17/20  0636 11/16/20  1838 11/16/20  1645 11/16/20  1119   ACCU CHECK GLUCOSE 788 mg/dL 188* 153* 106* 125* 107* 117* 131* 127*     A1c:   Lab Results   Component Value Date/Time    HBA1C 7.8 (H) 11/14/2020 02:39 AM            INFECTION MANAGEMENT:    Results from last 7 days   Lab Units 11/18/20  0252 11/17/20  0215 11/17/20  0001 11/16/20  1915 11/16/20  0327 11/15/20  0238   WBC 1501 K/uL 13.9* 15.1* 14.5* 13.7* 13.0* 12.2*   PLATELET COUNT 1518 K/uL 340 380 347 362 399 356     Wound culture results:   Results     Procedure Component Value Units Date/Time    BLOOD CULTURE [[id number]] Collected: 11/17/20 0001    Order Status: Completed Specimen: Blood from Peripheral Updated: 11/18/20 0732     Significant Indicator NEG     Source BLD     Site PERIPHERAL     Culture Result No Growth  Note: Blood cultures are incubated for 5 days and  are monitored continuously.Positive blood cultures  are called to the RN and reported as soon as  they are identified.      Narrative:      Per Hospital Policy: Only change Specimen Src: to \"Line\" if  specified by physician order.  Right Hand    BLOOD CULTURE [[id number]] Collected: 11/17/20 0056    Order Status: Completed Specimen: Blood from Peripheral Updated: 11/18/20 0732     Significant Indicator NEG     Source BLD     Site PERIPHERAL     Culture Result No Growth  Note: Blood cultures are incubated for 5 days and  are monitored continuously.Positive blood cultures  are called to the RN and reported as soon as  they are identified.      Narrative:      Per Hospital Policy: Only change Specimen Src: to \"Line\" if  specified by physician order.  Right Hand    URINALYSIS [[id number]] " " (Abnormal) Collected: 11/18/20 0454    Order Status: Completed Specimen: Urine, Clean Catch Updated: 11/18/20 0545     Color Yellow     Character Clear     Specific Gravity 1.022     Ph 7.5     Glucose 250 mg/dL      Ketones Negative mg/dL      Protein 100 mg/dL      Bilirubin Negative     Urobilinogen, Urine 0.2     Nitrite Negative     Leukocyte Esterase Negative     Occult Blood Negative     Micro Urine Req Microscopic    Narrative:      Collected By:[id number] CONSUELO GIANG    URINALYSIS [[id number]] Collected: 11/18/20 0454    Order Status: Canceled Specimen: Urine, Clean Catch     BLOOD CULTURE [[id number]] Collected: 11/13/20 2240    Order Status: Completed Specimen: Blood from Peripheral Updated: 11/15/20 0725     Significant Indicator NEG     Source BLD     Site PERIPHERAL     Culture Result No Growth  Note: Blood cultures are incubated for 5 days and  are monitored continuously.Positive blood cultures  are called to the RN and reported as soon as  they are identified.      Narrative:      Droplet, Contact, and Eye Protection  2 of 2 blood culture x2  Sites order. Per Hospital Policy:  Only change Specimen Src: to \"Line\" if specified by physician  order.  Right Wrist    BLOOD CULTURE [[id number]] Collected: 11/13/20 2228    Order Status: Completed Specimen: Blood from Peripheral Updated: 11/15/20 0725     Significant Indicator NEG     Source BLD     Site PERIPHERAL     Culture Result No Growth  Note: Blood cultures are incubated for 5 days and  are monitored continuously.Positive blood cultures  are called to the RN and reported as soon as  they are identified.      Narrative:      Droplet, Contact, and Eye Protection  1 of 2 for Blood Culture x 2 sites order. Per Hospital  Policy: Only change Specimen Src: to \"Line\" if specified by  physician order.  Right Forearm/Arm    CoV-2, Flu A/B, And RSV by PCR [[id number]] Collected: 11/13/20 2248    Order Status: Completed Updated: 11/14/20 0028     Influenza " "virus A RNA Negative     Influenza virus B, PCR Negative     RSV, PCR Negative     SARS-CoV-2 by PCR NotDetected     Comment: PATIENTS: Important information regarding your results and instructions can  be found at [email or web address]   \"After your  Covid-19 Test\"  [facility] providers: PLEASE REFER TO DE-ESCALATION AND RETESTING PROTOCOL  on [email or web address]  **The 3TEN8 GeneXpert Xpress SARS-CoV-2 Test has been made available for  use under the Emergency Use Authorization (EUA) only.          SARS-CoV-2 Source NP Swab    Narrative:      Droplet, Contact, and Eye Protection  Rule-out COVID-19 panel, includes droplet/contact/eye  isolation order.  Check influenza to order this test only if  specifically indicated.  Is patient being admitted?->Yes  Does this patient meet criteria for Rush/Cepheid per [facility]  Inpatient Workflow? (See workflow link below)->Yes  Expected turn around time?->Rush (Cepheid 2-4 hours)  Is this the patients First SARS CoV-2 test?->No  Is this patient employed in healthcare?->No  Is the patient symptomatic as defined by the CDC?->Yes  Date of symptom onset?->11/12/20  Is the patient hospitalized?->No  Is the patient a resident in a congregate care setting?->No  Is the patient pregnant?->No    COVID/SARS CoV-2 PCR [[id number]] Collected: 11/13/20 2483    Order Status: Completed Specimen: Respirate from Nasopharyngeal Updated: 11/13/20 2301     COVID Order Status Received     Comment: The order for SARS CoV-2 testing has been received by the  Laboratory. This result is neither positive nor negative.  Final results of testing will report in 24-48 hours, separately.         Narrative:      Droplet, Contact, and Eye Protection  Rule-out COVID-19 panel, includes droplet/contact/eye  isolation order.  Check influenza to order this test only if  specifically indicated.  Is patient being admitted?->Yes  Does this patient meet criteria for Rush/Cepheid per [facility]  Inpatient " Workflow? (See workflow link below)->Yes  Expected turn around time?->Rush (Cepheid 2-4 hours)  Is this the patients First SARS CoV-2 test?->No  Is this patient employed in healthcare?->No  Is the patient symptomatic as defined by the CDC?->Yes  Date of symptom onset?->11/12/20  Is the patient hospitalized?->No  Is the patient a resident in a congregate care setting?->No  Is the patient pregnant?->No    URINALYSIS [[id number]]     Order Status: Canceled Specimen: Urine                ASSESSMENT/PLAN:   POD #2 S/P right great toe amputation through MTPJ with bone biopsy of metatarsal head.      Wound care:   - wound care orders updated for nursing  -Right great toe amp: Adaptic, dry gauze, roll gauze, Ace wrap.  Change daily    Vascular status:   -Biphasic tones with Doppler to PT/DP bilaterally    Antibiotics:   -ID: Not involved  On Keflex renal dosed for HD      Plan to return to O.R.:   -no further surgeries planned at this time      Weight Bearing Status:   -Heel weight bearing    Offloading:   Postop shoe at bedside  -Orthotic company: None as he was waiting for right great toe wound to improve before he obtained shoes.    PT/OT :   -involved, last seen 11/18/2020    Diabetes Education:   Not involved this admission, declined    - Implications of loss of protective sensation (LOPS) discussed with patient- including increased risk for amputation.  Advised to check feet at least daily, moisturize feet, and to always wear protective foot wear.   -avoid trimming own nails. See podiatrist or certified foot and nail RN  -keep blood sugars <150 for improved wound healing          DISCHARGE PLAN:    Disposition: Home.  Recommend home health and outpatient wound care clinic      Follow-up: Dr. Lynn sutures to be removed approximately 3 weeks post-op      Discussed with: pt, RN, Dr. Lynn      Please note that this dictation was created using voice recognition software. I have  worked with technical experts from  [facility] to optimize the interface.  I have made every reasonable attempt to correct obvious errors, but there may be errors of grammar and possibly content that I did not discover before finalizing the note.      Carla Farah, PETRONA.P.R.N.    If any questions or concerns, please contact LPS through voalte or tiger text.

## 2020-11-18 NOTE — PROGRESS NOTES
"[facility] -  PROGRESS NOTE               Author: TABATHA Rivera, CNN-NP  Collaborating Physician: Dr. Marvin Mahajan  Date & Time: 11/18/2020  7:49 AM     HPI:  75 yo M PMH ESRD TTS iHD, HTN, type 2 DM, anemia of CKD, and CKD-MBD, CAD s/p stents, IDDM, A. fib, hypothyroidism and GERD who presented to the [facility] with worsening toe gangrene. Has undelrying severe and s/p L great toe amputation in may 2020. Followed by vascular surgery outpatient with a repeat balloon angioplasty about 1 month ago. For last several days worse right toe pain with subjective fevers. Last HD on Thursday. No issues with access of electrolytes. In the ED was noted to have sepsis from gangrene toe promting admission.  No chest pain or shortness of breath.  Without fever, chills, sweats    DAILY NEPHROLOGY SUMMARY:  11/14: consult done  11/15: tolerated HD, pending R. Great toe amputation tomorrow   11/16: NAEO, taken down to OR this AM for amputation  11/17: sitting up in bed, feeling fine, just got EEG, had code neuro yesterday for slurred speech, head CT neg for acute changes, still being worked up, thought maybe narcotic related, for HD today   11/18: sitting up in bed eating breakfast, feels okay, low Hgb so getting RBCs, Neuro did EEG, feels metabolic/toxic encephalitis, no residual ALOC noted, tolerated HD yesterday with UF: 2.0L     PAST FAMILY HISTORY: Reviewed and Unchanged  SOCIAL HISTORY: Reviewed and Unchanged  CURRENT MEDICATIONS: Reviewed  IMAGING STUDIES: Reviewed    ROS  General: No malaise  CV: No chest pain  RESP: No shortness of breath  GI: No abdominal pain   MSK: +toe pain  All other systems reviewed and negative    PHYSICAL EXAM  VS:  /47   Pulse 86   Temp 37.2 °C (98.9 °F) (Temporal)   Resp 18   Ht 1.676 m (5' 6\")   Wt 81 kg (178 lb 9.2 oz)   SpO2 93%   BMI 28.82 kg/m²   GENERAL: no acute distress  CV: RRR, No edema  RESP: non-labored  GI: Soft  MSK: Right foot with " dressing at site of amputation  SKIN: No concerning rashes  NEURO: AOx3  PSYCH: Cooperative    Fluids:  In: 980 [P.O.:480; Dialysis:500]  Out: 2500     LABS:  Recent Results (from the past 24 hour(s))   ACCU-CHEK GLUCOSE    Collection Time: 11/17/20 11:35 AM   Result Value Ref Range    Glucose - Accu-Ck 125 (H) 65 - 99 mg/dL   ACCU-CHEK GLUCOSE    Collection Time: 11/17/20  4:59 PM   Result Value Ref Range    Glucose - Accu-Ck 106 (H) 65 - 99 mg/dL   Comp Metabolic Panel    Collection Time: 11/18/20  2:52 AM   Result Value Ref Range    Sodium 133 (L) 135 - 145 mmol/L    Potassium 4.4 3.6 - 5.5 mmol/L    Chloride 92 (L) 96 - 112 mmol/L    Co2 22 20 - 33 mmol/L    Anion Gap 19.0 (H) 7.0 - 16.0    Glucose 177 (H) 65 - 99 mg/dL    Bun 26 (H) 8 - 22 mg/dL    Creatinine 4.31 (H) 0.50 - 1.40 mg/dL    Calcium 9.2 8.5 - 10.5 mg/dL    AST(SGOT) 13 12 - 45 U/L    ALT(SGPT) 5 2 - 50 U/L    Alkaline Phosphatase 77 30 - 99 U/L    Total Bilirubin 0.4 0.1 - 1.5 mg/dL    Albumin 3.1 (L) 3.2 - 4.9 g/dL    Total Protein 6.6 6.0 - 8.2 g/dL    Globulin 3.5 1.9 - 3.5 g/dL    A-G Ratio 0.9 g/dL   CBC WITH DIFFERENTIAL    Collection Time: 11/18/20  2:52 AM   Result Value Ref Range    WBC 13.9 (H) 4.8 - 10.8 K/uL    RBC 1.98 (L) 4.70 - 6.10 M/uL    Hemoglobin 6.5 (L) 14.0 - 18.0 g/dL    Hematocrit 20.3 (L) 42.0 - 52.0 %    .5 (H) 81.4 - 97.8 fL    MCH 32.8 27.0 - 33.0 pg    MCHC 32.0 (L) 33.7 - 35.3 g/dL    RDW 53.5 (H) 35.9 - 50.0 fL    Platelet Count 340 164 - 446 K/uL    MPV 9.2 9.0 - 12.9 fL    Neutrophils-Polys 84.20 (H) 44.00 - 72.00 %    Lymphocytes 5.90 (L) 22.00 - 41.00 %    Monocytes 8.40 0.00 - 13.40 %    Eosinophils 0.00 0.00 - 6.90 %    Basophils 0.10 0.00 - 1.80 %    Immature Granulocytes 1.40 (H) 0.00 - 0.90 %    Nucleated RBC 0.10 /100 WBC    Neutrophils (Absolute) 11.70 (H) 1.82 - 7.42 K/uL    Lymphs (Absolute) 0.82 (L) 1.00 - 4.80 K/uL    Monos (Absolute) 1.16 (H) 0.00 - 0.85 K/uL    Eos (Absolute) 0.00 0.00 - 0.51  K/uL    Baso (Absolute) 0.02 0.00 - 0.12 K/uL    Immature Granulocytes (abs) 0.19 (H) 0.00 - 0.11 K/uL    NRBC (Absolute) 0.02 K/uL   MAGNESIUM    Collection Time: 11/18/20  2:52 AM   Result Value Ref Range    Magnesium 2.0 1.5 - 2.5 mg/dL   ESTIMATED GFR    Collection Time: 11/18/20  2:52 AM   Result Value Ref Range    GFR If  16 (A) >60 mL/min/1.73 m 2    GFR If Non  13 (A) >60 mL/min/1.73 m 2   COD - Adult (Type and Screen)    Collection Time: 11/18/20  2:52 AM   Result Value Ref Range    ABO Grouping Only AB     Rh Grouping Only POS     Antibody Screen-Cod NEG     Component R       R99                 Red Cells, LR       [id number]   issued       11/18/20   07:04      Product Type R99     Dispense Status issued     Unit Number (Barcoded) [id number]     Product Code (Barcoded) C1714Q02     Blood Type (Barcoded) 2800    OCCULT BLOOD STOOL    Collection Time: 11/18/20  4:35 AM   Result Value Ref Range    Occult Blood Feces Negative Negative   URINALYSIS    Collection Time: 11/18/20  4:54 AM    Specimen: Urine, Clean Catch   Result Value Ref Range    Color Yellow     Character Clear     Specific Gravity 1.022 <1.035    Ph 7.5 5.0 - 8.0    Glucose 250 (A) Negative mg/dL    Ketones Negative Negative mg/dL    Protein 100 (A) Negative mg/dL    Bilirubin Negative Negative    Urobilinogen, Urine 0.2 Negative    Nitrite Negative Negative    Leukocyte Esterase Negative Negative    Occult Blood Negative Negative    Micro Urine Req Microscopic    URINE MICROSCOPIC (W/UA)    Collection Time: 11/18/20  4:54 AM   Result Value Ref Range    WBC 2-5 (A) /hpf    RBC 0-2 (A) /hpf    Bacteria Negative None /hpf    Epithelial Cells Few /hpf    Hyaline Cast 0-2 /lpf   ACCU-CHEK GLUCOSE    Collection Time: 11/18/20  6:23 AM   Result Value Ref Range    Glucose - Accu-Ck 153 (H) 65 - 99 mg/dL       (click the triangle to expand results)    ASSESSMENT:  # ESRD    TTS iHD @ [place]      Via R CVC        Has immature LUE AVF - follow up as outpt at [facility]   # R. Foot Gangrene   S/p amputation   # Sepsis    Secondary to gangrene    Keflex PO   # HTN, controlled   # Anemia of CKD   Hgb goal 10-11   Iron deplete but ferritin level precludes IV iron   ARMANDO with HD    Transfuse prn Hgb < 7.0  # CKD-MBD    Managed at HD unit   Continue phoslo, calcitriol   # HLD  # CAD  # Hyponatremia, mild   Manage with HD     PLAN:  - TTS iHD  - UF as tolerated  - Abx per primary  - Dose meds for ESRD  - Continue home PO4 binders  - ARMANDO with HD  - Neuro work up per primary team   - Transfuse today for hgb < 7.0  - Okay to transition to outpt hemodialysis when medically cleared

## 2020-11-18 NOTE — PROGRESS NOTES
3 1/2hr HD started @ 1308 and completed @ 1629,tx well tolerated,VSS,net UF = 2000ml.RINIALL TDC dressing CDI,report given to Rosemarie Mansfield RN.

## 2020-11-18 NOTE — PROGRESS NOTES
Assumed care at 0700, bedside report received from Meliza MONTOYA. Pt is medical. Initial assessment completed, orders reviewed, call light with reach, bed alarm on, and hourly rounding in place. POC addressed with patient, no additional questions at this time.  RBC infusing, hx of CHF noted. Monitoring for signs/symptoms of a transfusion rx.  C/o SOB, pt placed on 2L nasal canula. Will attempt to titrate.   R foot dressing completed by limb preservation, c/o pain relieved by PRN oxycodone.

## 2020-11-18 NOTE — PROGRESS NOTES
Daily Progress Note:     Date of Service: 11/18/2020  Primary Team: [facility] IM White Team   Attending: Harris Clay M.D.   Senior Resident: Dr. Sommers  Intern: Dr. Saldana  Contact:  [phone]    ID Statement:  74 year old man with history of PVD, DM2, ESRD on dialysis,  and prior amputations presenting with a R great toe gangrenous necrosis    Subjective:  -Hgb noted to be 6.5 from AM labs, transfused 1 unit of type and screened blood  -This AM noted feeling of increased energy since receiving blood  -Patient was noted to be febrile to 101.2 at around 2100 yesterday, patient denies any shortness of breath, pain, or subjective fever during that time or presently  -No recurrence of AMS noted yesterday night  -Shares that his toe pain is much improved this AM    Interval Updates:  -Resumed home Clopidogrel  -Resumed home Eliquis   -Chest XR ordered    Consultants/Specialty:  Orthopedics  Nephrology  Neurology  ID    Review of Systems:    Review of Systems   Constitutional: Negative for chills, diaphoresis, fever and malaise/fatigue.   HENT: Negative for hearing loss.    Eyes: Negative for blurred vision and double vision.   Respiratory: Negative for cough, sputum production and shortness of breath.    Cardiovascular: Negative for chest pain, palpitations, orthopnea, leg swelling and PND.   Gastrointestinal: Negative for abdominal pain, blood in stool, heartburn, melena, nausea and vomiting.   Genitourinary: Negative for dysuria, frequency, hematuria and urgency.   Musculoskeletal: Negative for falls and myalgias.   Skin: Negative for rash.   Neurological: Negative for dizziness, tremors, sensory change, speech change, focal weakness, seizures, loss of consciousness, weakness and headaches.   Psychiatric/Behavioral: Negative for depression, memory loss and suicidal ideas.       Objective Data:   Physical Exam:   Vitals:   Temp:  [37 °C (98.6 °F)-38.4 °C (101.2 °F)] 37 °C (98.6 °F)  Pulse:  [] 80  Resp:  [18-20]  20  BP: (109-135)/() 124/47  SpO2:  [90 %-94 %] 90 %     Physical Exam  Vitals signs and nursing note reviewed.   Constitutional:       General: He is not in acute distress.     Appearance: Normal appearance. He is not diaphoretic.   HENT:      Head: Normocephalic and atraumatic.      Nose: Nose normal.      Mouth/Throat:      Mouth: Mucous membranes are moist.   Eyes:      General: No scleral icterus.     Extraocular Movements: Extraocular movements intact.      Conjunctiva/sclera: Conjunctivae normal.      Pupils: Pupils are equal, round, and reactive to light.   Neck:      Musculoskeletal: Neck supple. No neck rigidity.   Cardiovascular:      Rate and Rhythm: Normal rate and regular rhythm.      Pulses: Normal pulses.      Heart sounds: No murmur.   Pulmonary:      Effort: Pulmonary effort is normal. No respiratory distress.      Breath sounds: No wheezing.   Abdominal:      General: Abdomen is flat. Bowel sounds are normal. There is no distension.      Palpations: Abdomen is soft. There is no mass.      Tenderness: There is no abdominal tenderness. There is no guarding.   Musculoskeletal:      Right lower leg: No edema.      Left lower leg: No edema.      Comments: R great toe s/p amputation, wrapped in bandages, no discharge or fluid leakage from surgical site  Left foot s/p L great toe amputation, no wound over surgical site   Skin:     General: Skin is warm.      Capillary Refill: Capillary refill takes less than 2 seconds.      Comments: Mild erythema around great toe, toe covered in antiseptic fluid   Neurological:      Mental Status: He is alert and oriented to person, place, and time.      Cranial Nerves: Cranial nerves are intact. No cranial nerve deficit, dysarthria or facial asymmetry.      Sensory: Sensation is intact.      Motor: No weakness.      Comments: 5/5 Bilateral strength on UE and LEs   Psychiatric:         Mood and Affect: Mood normal.         Behavior: Behavior normal.          Thought Content: Thought content normal.           Labs:   Results for SEGUN BRADSHAW (MRN [id number]) as of 11/18/2020 12:21   Ref. Range 11/18/2020 02:52 11/18/2020 04:35 11/18/2020 04:54 11/18/2020 06:23 11/18/2020 11:39   WBC Latest Ref Range: 4.8 - 10.8 K/uL 13.9 (H)       RBC Latest Ref Range: 4.70 - 6.10 M/uL 1.98 (L)       Hemoglobin Latest Ref Range: 14.0 - 18.0 g/dL 6.5 (L)       Hematocrit Latest Ref Range: 42.0 - 52.0 % 20.3 (L)       MCV Latest Ref Range: 81.4 - 97.8 fL 102.5 (H)       MCH Latest Ref Range: 27.0 - 33.0 pg 32.8       MCHC Latest Ref Range: 33.7 - 35.3 g/dL 32.0 (L)       RDW Latest Ref Range: 35.9 - 50.0 fL 53.5 (H)       Platelet Count Latest Ref Range: 164 - 446 K/uL 340       MPV Latest Ref Range: 9.0 - 12.9 fL 9.2       Neutrophils-Polys Latest Ref Range: 44.00 - 72.00 % 84.20 (H)       Neutrophils (Absolute) Latest Ref Range: 1.82 - 7.42 K/uL 11.70 (H)       Lymphocytes Latest Ref Range: 22.00 - 41.00 % 5.90 (L)       Lymphs (Absolute) Latest Ref Range: 1.00 - 4.80 K/uL 0.82 (L)       Monocytes Latest Ref Range: 0.00 - 13.40 % 8.40       Monos (Absolute) Latest Ref Range: 0.00 - 0.85 K/uL 1.16 (H)       Eosinophils Latest Ref Range: 0.00 - 6.90 % 0.00       Eos (Absolute) Latest Ref Range: 0.00 - 0.51 K/uL 0.00       Basophils Latest Ref Range: 0.00 - 1.80 % 0.10       Baso (Absolute) Latest Ref Range: 0.00 - 0.12 K/uL 0.02       Immature Granulocytes Latest Ref Range: 0.00 - 0.90 % 1.40 (H)       Immature Granulocytes (abs) Latest Ref Range: 0.00 - 0.11 K/uL 0.19 (H)       Nucleated RBC Latest Units: /100 WBC 0.10       NRBC (Absolute) Latest Units: K/uL 0.02       Sodium Latest Ref Range: 135 - 145 mmol/L 133 (L)       Potassium Latest Ref Range: 3.6 - 5.5 mmol/L 4.4       Chloride Latest Ref Range: 96 - 112 mmol/L 92 (L)       Co2 Latest Ref Range: 20 - 33 mmol/L 22       Anion Gap Latest Ref Range: 7.0 - 16.0  19.0 (H)       Glucose Latest Ref Range: 65 - 99 mg/dL 177 (H)        Bun Latest Ref Range: 8 - 22 mg/dL 26 (H)       Creatinine Latest Ref Range: 0.50 - 1.40 mg/dL 4.31 (H)       GFR If  Latest Ref Range: >60 mL/min/1.73 m 2 16 (A)       GFR If Non  Latest Ref Range: >60 mL/min/1.73 m 2 13 (A)       Calcium Latest Ref Range: 8.5 - 10.5 mg/dL 9.2       AST(SGOT) Latest Ref Range: 12 - 45 U/L 13       ALT(SGPT) Latest Ref Range: 2 - 50 U/L 5       Alkaline Phosphatase Latest Ref Range: 30 - 99 U/L 77       Total Bilirubin Latest Ref Range: 0.1 - 1.5 mg/dL 0.4       Albumin Latest Ref Range: 3.2 - 4.9 g/dL 3.1 (L)       Total Protein Latest Ref Range: 6.0 - 8.2 g/dL 6.6       Globulin Latest Ref Range: 1.9 - 3.5 g/dL 3.5       A-G Ratio Latest Units: g/dL 0.9       Magnesium Latest Ref Range: 1.5 - 2.5 mg/dL 2.0       Urobilinogen, Urine Latest Ref Range: Negative    0.2     Occult Blood Feces Latest Ref Range: Negative   Negative      Glucose - Accu-Ck Latest Ref Range: 65 - 99 mg/dL    153 (H) 188 (H)   Color Unknown   Yellow     Character Unknown   Clear     Specific Gravity Latest Ref Range: <1.035    1.022     Ph Latest Ref Range: 5.0 - 8.0    7.5     Glucose Latest Ref Range: Negative mg/dL   250 (A)     Ketones Latest Ref Range: Negative mg/dL   Negative     Bilirubin Latest Ref Range: Negative    Negative     Occult Blood Latest Ref Range: Negative    Negative     Protein Latest Ref Range: Negative mg/dL   100 (A)     Nitrite Latest Ref Range: Negative    Negative     Leukocyte Esterase Latest Ref Range: Negative    Negative     Micro Urine Req Unknown   Microscopic     WBC Latest Units: /hpf   2-5 (A)     RBC Latest Units: /hpf   0-2 (A)     Epithelial Cells Latest Units: /hpf   Few     Bacteria Latest Ref Range: None /hpf   Negative     Hyaline Cast Latest Units: /lpf   0-2     ABO Grouping Only Unknown AB       Rh Grouping Only Unknown POS       Antibody Screen-Cod Unknown NEG       Product Type Unknown R99       Component R Unknown R99               ...         Imaging:   EEG: abnormal, mild to moderate toxic/metabolic encephalopathy suggested, no seizures captured     Problem Representation: 74 year old male with PMH notable for PVD s/p left great toe amputation, DM2, ESRD on dialysis, CAD s/p stenting, who presented for a 2 month history of right great toe wound, s/p amputation on 11/16, care complicated by episode of AMS on 11/16 PM  now resolved spontaneously, likely secondary to anesthesia/pain meds.    * Wound of right foot- (present on admission)  Assessment & Plan  -History of right great toe wound for 2 months that has had acute exacerbation with increased pain for last 3 days with clear liquid drainage  -Followed by vascular surgery outpatient s/p revascularization of RLE about 1 month ago  -Seen in office with vascular yesterday that confirmed his vascular flow is good in RLE, but referred patient to ED  -Afebrile and HD stable.  -Leukocytosis, ESR >120, CRP 24.4.   -Normal lactic acid and CPK  -FXR: shows evidence of gas in the tissue, however negative for osteomyelitis, fracture, or dislocation  -Taken for amputation of R great toe on 11/16/2020  IV Vancomycin and IV Zosyn stopped on 11/17, exchanged for Cephalexin    Plan:  -Orthopedics following  -Holding home Eliquis and Plavix, Heparin for VTE ppx  -Pain control  -Abx:  Cephalexin 500mg Q daily (No manufactory recommendations for renal dosing on patient on hemodialysis, however some guidelines suggest 250-500mg every 12-24 hours as an adjustment)  -Blood cultures NGTD  -Wound care consult  -Will follow amputation pathology histo; no evidence of osteomyelitis, now treating for cellulitis with Cephalexin  -XR chest ordered on 11/18; pending results  -PT/OT for discharge recs    Altered mental status  Assessment & Plan  -Noted to have altered mentation, difficulty following commands, and slurred speech at around 1800 on 11/16, was seen at 1730 and said deficits were not noted at that  time  -On the AM of 11/17, patient is back to baseline with previously noted deficits now resolved.   -Unclear etiology, likely not stroke as workup has been negative  -Likely not secondary to hyponatremia; patient is only mildly hyponatremic and only changed from 131 to 129 between AM and PM labs of the date of episode  -B12 and Ammonia within reference range, Free T4 mildly elevated  -Unlikely due to uremia as BUN increased from 38 to 47; not a very major increase  -Possibly secondary to seizure activity given reported history of a similar episode in the past.  -EEG: abnormal with possible toxic/metabolic encephalopathy, unclear how patient's baseline electrolyte abnormalities play into readings  -Likely secondary to anesthesia/ pain med's given that symptoms have not recurred since decreasing pain meds   -Will continue to monitor    Peripheral vascular disease (HCC)- (present on admission)  Assessment & Plan  -Followed by vascular surgery outpatient  -S/p revascularization of RLE about 1 month ago  -Seen in vascular office yesterday and told blood flow is doing well in RLE, but referred to ED for antibiotics and further evaluation  -Pulses heard on doppler exam in ED    Plan:    -On ASA 81 daily  -Continue home Atorvastatin 40    End stage renal disease on dialysis (HCC)- (present on admission)  Assessment & Plan  -On TT hemodialysis outpatient  -Last Dialysis session on 11/17  -Nephrology following  -Continue home Phos-lo for renal osteodystrophy    Anemia of chronic disease- (present on admission)  Assessment & Plan  -Secondary to ESRD, patient needs EPO supplementation at baseline  -Receiving epoietin.  -Ferritin >500. No iron indicated. Could be acute phase reactant as well.  -Hemoglobin dropped from 7.9 to 6.9 from the AM to PM of 11/16  -FOBT negative  -Low suspicion of active bleed at this time, drops better explained by chronic anemia/ESRD  -Will continue to monitor    Chest pain  Assessment &  Plan  -History of intermittent chest pain, non-radiating, intermittent, self resolving, lasting about 20-30 seconds  -EKG sinus rhythm with RBBB and LAFB, no ST changes concerning for ischemia or infarct  -No active chest pain at this time  -Troponin 142 --> 131  -EKG and stat troponin PRN if chest pain is to recur    Paroxysmal A-fib (HCC)- (present on admission)  Assessment & Plan  -In sinus rhythm on admission  -Rate is controlled. Coreg 3.125 bid  -Holding Eliquis for possible additional procedures    Secondary hyperparathyroidism of renal origin (HCC)- (present on admission)  Assessment & Plan  -Continue home Calcitriol    Diabetes mellitus type 2 in nonobese (HCC)- (present on admission)  Assessment & Plan  -Last A1c 04/2020: 5.8 now 7.8  -Likely contributing to lower extremity toe gangrene  -On Lantus 5 units at home  -Holding home Lantus  -SSI  -Hypoglycemic protocol  -Outpatient follow up needed    Dyslipidemia- (present on admission)  Assessment & Plan  -Continue home Atorvastatin    Gastroesophageal reflux disease- (present on admission)  Assessment & Plan  -Continue home Protonix 40 mg     Essential hypertension- (present on admission)  Assessment & Plan  -Continue home Coreg 3.125 mg BID    CAD (coronary artery disease)- (present on admission)  Assessment & Plan  -s/p LAD stent on Plavix at home   -Continuing home Atorvastatin  -Resumed home Eliquis and Plavix on 11/18/2020    Hyperthyroidism- (present on admission)  Assessment & Plan  -Continue home Methimazole  -Last TSH was 10.0 with normal FT4  -Repeat TSH now low        Quality Measures:  Code: FULL  VTE: Eliquis, Plavix  Diet: Renal  Disposition: Inpatient, pending PT/OT discharge recs

## 2020-11-18 NOTE — THERAPY
"Occupational Therapy   Initial Evaluation     Patient Name: Luis Sepulveda  Age:  74 y.o., Sex:  male  Medical Record #: [id number]  Today's Date: 11/18/2020     Precautions  Precautions: (P) Fall Risk, Weight Bearing As Tolerated Right Lower Extremity(post-op shoe for WB'ing)  Comments: (P) s/p R great toe amputation    Assessment  Patient is 74 y.o. male admitted for R great toe wound now s/p amputation, RLE WBAT in post-op shoe. Pt normally independent with all ADLs and mobility without an AD, has had amputation for left great toe in past with similar outcome/surgery. Pt normally lives alone in [facility] but will have ex-wife and son with him at discharge to assist as well as grandson intermittently available to assist. Pt able to complete all functional transfers with supervision assist and verbal cues for technique, attempted to educate patient on post-op shoe doffing/donning, pt disinterested and would rather have family assist in adaptive equipment. Pt would benefit from home health therapy for home safety and equipment recommendation as needed, no further skilled OT needs identified while admitted, will complete order at this time.    Plan    Recommend Occupational Therapy for Evaluation only.    DC Equipment Recommendations: (P) None  Discharge Recommendations: (P) Recommend home health for continued occupational therapy services     Subjective    \"My ex-wife will pick me up when I can leave\"     Objective       11/18/20 1425   Prior Living Situation   Prior Services Home-Independent   Housing / Facility 1 Story House   Steps Into Home 0   Steps In Home 0   Bathroom Set up Bathtub / Shower Combination  (has tub transfer bench available had not used)   Equipment Owned Tub / Shower Seat   Lives with - Patient's Self Care Capacity Other (Comments)  (ex-wife and son, grandson available as well)   Comments pts family works during the day, home alone most of day   Prior Level of ADL Function   Self Feeding " Independent   Grooming / Hygiene Independent   Bathing Independent   Dressing Independent   Toileting Independent   Prior Level of IADL Function   Medication Management Independent   Laundry Independent   Kitchen Mobility Independent   Finances Independent   Home Management Independent   Shopping Independent   Prior Level Of Mobility Independent Without Device in Community   Driving / Transportation Relatives / Others Provide Transportation   Occupation (Pre-Hospital Vocational) Not Employed;Retired Due To Age   History of Falls   History of Falls No   Precautions   Precautions Fall Risk;Weight Bearing As Tolerated Right Lower Extremity  (post-op shoe for WB'ing)   Comments s/p R great toe amputation   Vitals   O2 (LPM) 3   O2 Delivery Device Silicone Nasal Cannula   Pain 0 - 10 Group   Therapist Pain Assessment During Activity   Non Verbal Descriptors   Non Verbal Scale  Calm;Sleeping   Cognition    Cognition / Consciousness WDL   Level of Consciousness Alert   Comments pleasant, cooperative, sarcastic at times limited receptiveness to education   Active ROM Upper Body   Active ROM Upper Body  WDL   Dominant Hand Left   Strength Upper Body   Upper Body Strength  WDL   Sensation Upper Body   Upper Extremity Sensation  WDL   Upper Body Muscle Tone   Upper Body Muscle Tone  WDL   Neurological Concerns   Neurological Concerns No   Coordination Upper Body   Coordination WDL   Balance Assessment   Sitting Balance (Static) Good   Sitting Balance (Dynamic) Fair +   Standing Balance (Static) Fair   Standing Balance (Dynamic) Fair   Weight Shift Sitting Good   Weight Shift Standing Fair   Comments stand with FWW   Bed Mobility    Supine to Sit Supervised   Scooting Supervised   ADL Assessment   Grooming Supervision;Seated   Upper Body Dressing Supervision   Lower Body Dressing Minimal Assist  (assist with post-op shoe management)   Toileting   (NT- pt refused)   Comments Pt states ex-wife or son will assist with post-op shoe  at home, attempted to educate patient seemed disinterested with plans to rely on family   How much help from another person does the patient currently need...   Putting on and taking off regular lower body clothing? 3   Bathing (including washing, rinsing, and drying)? 4   Toileting, which includes using a toilet, bedpan, or urinal? 4   Putting on and taking off regular upper body clothing? 4   Taking care of personal grooming such as brushing teeth? 4   Eating meals? 4   6 Clicks Daily Activity Score 23   Functional Mobility   Sit to Stand Supervised   Bed, Chair, Wheelchair Transfer Supervised   Toilet Transfers Refused   Transfer Method Stand Step   Mobility bed mobility, STS from bedside, ambulating within room and into hallway   Comments w/ FWW   Visual Perception   Visual Perception  WDL   Activity Tolerance   Sitting in Chair 5 min left seated in chair   Sitting Edge of Bed 10   Standing 10   Comments No pain, sob, or fatigue   Education Group   Education Provided Role of Occupational Therapist;Adaptive Equipment   Role of Occupational Therapist Patient Response Patient;Acceptance;Explanation;Verbal Demonstration   Adaptive Equipment Patient Response Patient;Acceptance;Explanation;Reinforcement Needed;No Learning Evidence  (would rather depend on family to assist, disinterested)   Interdisciplinary Plan of Care Collaboration   IDT Collaboration with  Physical Therapist;Nursing   Patient Position at End of Therapy Seated;Chair Alarm On;Call Light within Reach;Tray Table within Reach;Phone within Reach   Collaboration Comments RN updated

## 2020-11-18 NOTE — CARE PLAN
Problem: Safety  Goal: Will remain free from injury  Outcome: PROGRESSING AS EXPECTED  Goal: Will remain free from falls  Outcome: PROGRESSING AS EXPECTED   Fall precautions in place. Bed alarm on and active. Hourly rounding, call bell within reach.   Problem: Knowledge Deficit  Goal: Knowledge of disease process/condition, treatment plan, diagnostic tests, and medications will improve  Outcome: PROGRESSING AS EXPECTED  Goal: Knowledge of the prescribed therapeutic regimen will improve  Outcome: PROGRESSING AS EXPECTED   Educated on importance of call bell usage. Educated on need for blood transfusion and signs/symptoms of potential side effects.

## 2020-11-18 NOTE — PROGRESS NOTES
Received call at 3 AM that patient's Hgb had dropped to 6.5 this morning from 7.1 yesterday. Patient underwent HD today with no reported complications. Patient was assessed at bedside.  No signs of active bleeding along dialysis catheter site or right foot. Patient denied any hematochezia, melena or abdominal pain.  He reported some lightheadness but denied any chest pain or shortness of breath. Patient had a blood transfusion one year ago without any reported complications. Consent for transfusion was obtained and order for 1U PRBCs was placed.

## 2020-11-18 NOTE — FACE TO FACE
Face to Face Note  -  Durable Medical Equipment    John Sommers M.D. - NPI: [id number]  I certify that this patient is under my care and that they have had a durable medical equipment(DME)face to face encounter by myself that meets the physician DME face-to-face encounter requirements with this patient on:    Date of encounter:   Patient:                    MRN:                       YOB: 2022  Luis Sepulveda  [id number]  1945     The encounter with the patient was in whole, or in part, for the following medical condition, which is the primary reason for durable medical equipment:  Other - amputation, ambulation    I certify that, based on my findings, the following durable medical equipment is medically necessary:  Walkers.  Front wheel walker    My Clinical findings support the need for the above equipment due to:  Abnormal Gait    Supporting Symptoms: Patient with great toe amputation     ------------------------------------------------------------------------------------------------------------------    Face to Face Supporting Documentation - Home Health    The encounter with this patient was in whole or in part the primary reason for home health admission.    Date of encounter:   Patient:                    MRN:                       YOB: 2022  Luis Sepulveda  [id number]  1945     Home health to see patient for:  Skilled Nursing care for assessment, interventions & education, Physical Therapy evaluation and treatment and Occupational therapy evaluation and treatment    Skilled need for:  Comment: great toe amputation    Skilled nursing interventions to include:  Comment: physical and occupational therapy    Homebound evidenced status by:  Need the aid of supportive devices such as crutches, canes, wheelchairs or walkers. Leaving home must require a considerable and taxing effort. There must exist a normal inability to leave the  home.    Community Physician to provide follow up care: Patito Edgar M.D.     Optional Interventions    Wound information & treatment:    Home Infusion Therapy orders:    Line/Drain/Airway:    I certify the face to face encounter for this home care referral meets the CMS requirements and the encounter/clinical assessment with the patient was, in whole, or in part, for the medical condition(s) listed above, which is the primary reason for home health care. Based on my clinical findings: the service(s) are medically necessary, support the need for home health care, and the homebound criteria are met.  I certify that this patient has had a face to face encounter by myself.  John Sommers M.D. - NPI: [id number]    *Debility, frailty and advanced age in the absence of an acute deterioration or exacerbation of a condition do not qualify a patient for home health.

## 2020-11-18 NOTE — THERAPY
Physical Therapy   Initial Evaluation     Patient Name: Luis Sepulveda  Age:  74 y.o., Sex:  male  Medical Record #: [id number]  Today's Date: 11/18/2020     Precautions: Fall Risk, Weight Bearing As Tolerated Right Lower Extremity(post-op shoe)    Assessment  Patient is a 74 y.o. male presenting to PT s/p R foot first ray amputation on 11/16. Pt has a post-op shoe and is able to WBAT. Pt completed functional mobility with SPV and ambulated with FWW, no LOB. Pt reports no concerns with return home and states his family is able to assist if needed. Encouraged pt to use FWW at home for safety. Recommend pt continue to ambulate with nursing while in acute setting. Pt appears functionally capable of return home at this time and will not be actively followed for physical therapy services. Pt may be seen if requested by physician for 1 more visit within 30 days to address any discharge or equipment needs      Plan  Recommend Physical Therapy for Evaluation only   DC Equipment Recommendations: Front-Wheel Walker  Discharge Recommendations: Recommend home health for continued physical therapy services       11/18/20 8609   Prior Living Situation   Prior Services Home-Independent   Housing / Facility 1 Story House   Steps Into Home 0   Steps In Home 0   Bathroom Set up Bathtub / Shower Combination   Equipment Owned Tub / Shower Seat   Lives with - Patient's Self Care Capacity Other (Comments)   Comments pt lives with his son and ex-wife. reports they are able to assist as needed; however, they do work during the day. reports his grandson visits often to assist as well.    Prior Level of Functional Mobility   Bed Mobility Independent   Transfer Status Independent   Ambulation Independent   Distance Ambulation (Feet) limited community   Assistive Devices Used None   Comments reports limited ambulation d/t foot pain   Cognition    Comments pleasant and cooperative. can be sarcastic and difficult to assess whether or not pt  was internalizing education   Balance Assessment   Sitting Balance (Static) Good   Sitting Balance (Dynamic) Fair +   Standing Balance (Static) Fair   Standing Balance (Dynamic) Fair   Weight Shift Sitting Good   Weight Shift Standing Fair   Comments standing with FWW   Gait Analysis   Gait Level Of Assist Supervised   Assistive Device Front Wheel Walker   Distance (Feet) 250   Deviation Antalgic   Weight Bearing Status WBAT RLE in post-op shoe   Comments no LOB. cues for proper FWW management. encouraged pt to use walker at home, pt reports he probably wont, but then with incr education on role of walker especially with pain/antalgic gait, pt agreeable   Bed Mobility    Supine to Sit Supervised   Scooting Supervised   Functional Mobility   Sit to Stand Supervised   Bed, Chair, WC Transfer Supervised

## 2020-11-19 PROBLEM — R41.82 ALTERED MENTAL STATUS: Status: RESOLVED | Noted: 2020-01-01 | Resolved: 2020-01-01

## 2020-11-19 PROBLEM — R07.9 CHEST PAIN: Status: RESOLVED | Noted: 2020-01-01 | Resolved: 2020-01-01

## 2020-11-19 NOTE — DISCHARGE SUMMARY
"Discharge Summary    CHIEF COMPLAINT ON ADMISSION  Chief Complaint   Patient presents with   • Wound Check   • Sent by MD       Reason for Admission  SENT BY MD     Admission Date  11/13/2020    CODE STATUS  Full Code    HPI & HOSPITAL COURSE  This is a 74 y.o. male here with AMS, felt to be toxic metabolic encephalopathy, with workup otherwise negative.    R great toe wound, without osteo, d/t severe PVD and uncontrolled T2DM.  Flow intact per Vasc Surg.  S/p amputation 11/16.      # Atypical chest pain - denies currently  # CAD s/p LAD stent, elevated trop, EKG RBBB, LAFB no ischemia and sinus, TTE - improved LV fxn 55%, no wall motion, LAE mod, tricuspid, 40 RVSP  # p Afib on eliquis  # ESRD TTHS HD, phoslo, completed HD this am  # Hyperthyroidism, Free T4 1.71  # HLD on atorva 40  # Hyperthy, on methimazole  # Anemia of chronic disease  # Hypoxia with HD, currently 92% on RA, declines d/c with 02  # Leukocytosis improving    Will need PT/OT, unable to bear weight.  Has wound care set up.  Pt very ready for d/c - \"I was ready yesterday!\"    Therefore, he is discharged in good and stable condition to home with organized home healthcare and close outpatient follow-up.    The patient met 2-midnight criteria for an inpatient stay at the time of discharge.    Discharge Date  11/19/2020    FOLLOW UP ITEMS POST DISCHARGE  Wound care  Above chronic medical conditions as listed above    DISCHARGE DIAGNOSES  Principal Problem:    Wound of right foot POA: Yes  Active Problems:    Anemia of chronic disease POA: Yes    End stage renal disease on dialysis (HCC) POA: Yes    Peripheral vascular disease (HCC) POA: Yes    Hyperthyroidism POA: Yes    CAD (coronary artery disease) POA: Yes      Overview: Stents to proximal and mid LAD in [facility].    Essential hypertension POA: Yes    Gastroesophageal reflux disease POA: Yes    Dyslipidemia POA: Yes    Diabetes mellitus type 2 in nonobese (HCC) POA: Yes    Secondary " hyperparathyroidism of renal origin (HCC) POA: Yes    Paroxysmal A-fib (HCC) POA: Yes  Resolved Problems:    Altered mental status POA: Yes    Chest pain POA: Yes      FOLLOW UP  Future Appointments   Date Time Provider Department Center   12/2/2020 11:00 AM Kulwant Osborn M.D. [facility] None     Patito Edgar M.D.  [address] [facility] [address] [place] [address]  [phone]    In 1 week        MEDICATIONS ON DISCHARGE     Medication List      START taking these medications      Instructions   aspirin 81 MG Chew chewable tablet  Start taking on: November 20, 2020  Commonly known as: ASA   Chew 1 Tab every day.  Dose: 81 mg     cephALEXin 500 MG Caps  Commonly known as: KEFLEX   Take 1 Cap by mouth every day for 4 days.  Dose: 500 mg        CHANGE how you take these medications      Instructions   * calcitRIOL 0.25 MCG Caps  What changed: Another medication with the same name was added. Make sure you understand how and when to take each.  Commonly known as: ROCALTROL   Take 0.25 mcg by mouth every day.  Dose: 0.25 mcg     * calcitRIOL 0.25 MCG Caps  Start taking on: November 20, 2020  What changed: You were already taking a medication with the same name, and this prescription was added. Make sure you understand how and when to take each.  Commonly known as: ROCALTROL   Take 1 Cap by mouth every day.  Dose: 0.25 mcg     * calcium acetate 667 MG Caps  What changed: Another medication with the same name was added. Make sure you understand how and when to take each.  Commonly known as: PHOS-LO   Take 1,334 mg by mouth 3 times a day, with meals.  Dose: 1,334 mg     * calcium acetate 667 MG Tabs tablet  What changed: Another medication with the same name was added. Make sure you understand how and when to take each.  Commonly known as: PHOS-LO      * calcium acetate 667 MG Tabs tablet  What changed: You were already taking a medication with the same name, and this prescription was added. Make sure you understand how and when  to take each.  Commonly known as: PHOS-LO   Take 1 Tab by mouth 3 times a day before meals.  Dose: 667 mg     * carvedilol 3.125 MG Tabs  What changed: Another medication with the same name was added. Make sure you understand how and when to take each.  Commonly known as: COREG   Take 3.125 mg by mouth 2 times a day, with meals.  Dose: 3.125 mg     * carvedilol 3.125 MG Tabs  What changed: You were already taking a medication with the same name, and this prescription was added. Make sure you understand how and when to take each.  Commonly known as: COREG   Take 1 Tab by mouth 2 times a day, with meals.  Dose: 3.125 mg     * clopidogrel 75 MG Tabs  What changed: Another medication with the same name was added. Make sure you understand how and when to take each.  Commonly known as: PLAVIX   Doctor's comments: Requesting 1 year supply  TAKE 1 TABLET BY MOUTH  DAILY     * clopidogrel 75 MG Tabs  Start taking on: November 20, 2020  What changed: You were already taking a medication with the same name, and this prescription was added. Make sure you understand how and when to take each.  Commonly known as: PLAVIX   Take 1 Tab by mouth every day.  Dose: 75 mg     * methimazole 5 MG Tabs  What changed: Another medication with the same name was added. Make sure you understand how and when to take each.  Commonly known as: TAPAZOLE   TAKE 1.5 TABLETS BY MOUTH  IN THE MORNING AND 1 TABLET IN THE  EVENING     * methimazole 5 MG Tabs  What changed: You were already taking a medication with the same name, and this prescription was added. Make sure you understand how and when to take each.  Commonly known as: TAPAZOLE   Take 1 Tab by mouth every evening.  Dose: 5 mg     * methimazole 5 MG Tabs  Start taking on: November 20, 2020  What changed: You were already taking a medication with the same name, and this prescription was added. Make sure you understand how and when to take each.  Commonly known as: TAPAZOLE   Take 1.5 Tabs by  mouth every morning.  Dose: 7.5 mg         * This list has 12 medication(s) that are the same as other medications prescribed for you. Read the directions carefully, and ask your doctor or other care provider to review them with you.            CONTINUE taking these medications      Instructions   apixaban 2.5mg Tabs  Commonly known as: Eliquis   Take 1 Tab by mouth 2 Times a Day.  Dose: 2.5 mg     atorvastatin 40 MG Tabs  Commonly known as: LIPITOR   Take 1 Tab by mouth every bedtime.  Dose: 40 mg     Lantus SoloStar 100 UNIT/ML Sopn injection  Generic drug: insulin glargine   Inject 5 Units as instructed every bedtime.  Dose: 5 Units     ondansetron 4 MG Tabs tablet  Commonly known as: ZOFRAN   Take 1 Tab by mouth.  Dose: 1 Tab     ondansetron 4 MG Tbdp  Commonly known as: ZOFRAN ODT      Protonix 40 MG Tbec  Generic drug: pantoprazole   Take 40 mg by mouth every bedtime.  Dose: 40 mg            Allergies  Allergies   Allergen Reactions   • Mircera [Methoxy Polyethylene Glycol-Epoetin Beta] Hives       DIET  Orders Placed This Encounter   Procedures   • Diet Order Diet: Renal     Standing Status:   Standing     Number of Occurrences:   1     Order Specific Question:   Diet:     Answer:   Renal [8]       ACTIVITY  Bed rest for 4 weeks. or until wound healed  Non wt bearing    CONSULTATIONS  Ortho  Wound Care  Nephrology  Neurology    PROCEDURES  Amputation     LABORATORY  Lab Results   Component Value Date    SODIUM 128 (L) 11/19/2020    POTASSIUM 3.7 11/19/2020    CHLORIDE 90 (L) 11/19/2020    CO2 26 11/19/2020    GLUCOSE 158 (H) 11/19/2020    BUN 39 (H) 11/19/2020    CREATININE 6.06 (HH) 11/19/2020        Lab Results   Component Value Date    WBC 12.7 (H) 11/19/2020    HEMOGLOBIN 7.7 (L) 11/19/2020    HEMATOCRIT 23.3 (L) 11/19/2020    PLATELETCT 380 11/19/2020

## 2020-11-19 NOTE — PROGRESS NOTES
Glucose 130.  Dietary notified to reroute breakfast tray to dialysis, dialysis aware epogen is not on unit to send with the patient. Patient to dialysis with transporter.

## 2020-11-19 NOTE — PROGRESS NOTES
LIMB PRESERVATION SERVICE     74-year-old male with diabetes, right great toe gangrene.    POD #2 Sp right great toe amputation at [facility] with Dr. Lynn    Incision approximated with sutures except for middle where there is slight opening.  Tissue surrounding incision is macerated extending down to plantar metatarsal head    Pathology negative however cellulitis remains around incision site.    Plan  Dressing orders placed  Recommend ID consultation to determine duration of antibiotic therapy  Heel weightbearing in postop shoe

## 2020-11-19 NOTE — PROGRESS NOTES
"[facility] -  PROGRESS NOTE               Author: TABATHA Rivera, CNN-NP  Collaborating Physician: Dr. Marvin Mahajan  Date & Time: 11/19/2020  9:19 AM     HPI:  73 yo M PMH ESRD TTS iHD, HTN, type 2 DM, anemia of CKD, and CKD-MBD, CAD s/p stents, IDDM, A. fib, hypothyroidism and GERD who presented to the [facility] with worsening toe gangrene. Has undelrying severe and s/p L great toe amputation in may 2020. Followed by vascular surgery outpatient with a repeat balloon angioplasty about 1 month ago. For last several days worse right toe pain with subjective fevers. Last HD on Thursday. No issues with access of electrolytes. In the ED was noted to have sepsis from gangrene toe promting admission.  No chest pain or shortness of breath.  Without fever, chills, sweats    DAILY NEPHROLOGY SUMMARY:  11/14: consult done  11/15: tolerated HD, pending R. Great toe amputation tomorrow   11/16: NAEO, taken down to OR this AM for amputation  11/17: sitting up in bed, feeling fine, just got EEG, had code neuro yesterday for slurred speech, head CT neg for acute changes, still being worked up, thought maybe narcotic related, for HD today   11/18: sitting up in bed eating breakfast, feels okay, low Hgb so getting RBCs, Neuro did EEG, feels metabolic/toxic encephalitis, no residual ALOC noted, tolerated HD yesterday with UF: 2.0L   11/19: seen on HD, feeling good, has some questions about 02 at home, advised to follow up with hosp MD    PAST FAMILY HISTORY: Reviewed and Unchanged  SOCIAL HISTORY: Reviewed and Unchanged  CURRENT MEDICATIONS: Reviewed  IMAGING STUDIES: Reviewed    ROS  General: No malaise  CV: No chest pain  RESP: No shortness of breath  GI: No abdominal pain   MSK: +toe pain  All other systems reviewed and negative    PHYSICAL EXAM  VS:  /63   Pulse 78   Temp 36.5 °C (97.7 °F) (Temporal)   Resp 17   Ht 1.676 m (5' 6\")   Wt 84.2 kg (185 lb 10 oz)   SpO2 98%   BMI 29.96 " kg/m²   GENERAL: no acute distress  CV: RRR, No edema  RESP: non-labored  GI: Soft  MSK: Right foot with dressing at site of amputation  SKIN: No concerning rashes  NEURO: AOx3  PSYCH: Cooperative    Fluids:  In: 1468.3 [P.O.:1080; Blood:388.3]  Out: -     LABS:  Recent Results (from the past 24 hour(s))   ACCU-CHEK GLUCOSE    Collection Time: 11/18/20 11:39 AM   Result Value Ref Range    Glucose - Accu-Ck 188 (H) 65 - 99 mg/dL   HEMOGLOBIN AND HEMATOCRIT    Collection Time: 11/18/20  2:46 PM   Result Value Ref Range    Hemoglobin 7.7 (L) 14.0 - 18.0 g/dL    Hematocrit 22.9 (L) 42.0 - 52.0 %   ACCU-CHEK GLUCOSE    Collection Time: 11/18/20  4:18 PM   Result Value Ref Range    Glucose - Accu-Ck 181 (H) 65 - 99 mg/dL   VANCOMYCIN TIMED (RANDOM) LEVEL    Collection Time: 11/19/20  2:54 AM   Result Value Ref Range    Vancomycin Unknown Level 14.1 ug/mL   Comp Metabolic Panel    Collection Time: 11/19/20  2:54 AM   Result Value Ref Range    Sodium 128 (L) 135 - 145 mmol/L    Potassium 3.7 3.6 - 5.5 mmol/L    Chloride 90 (L) 96 - 112 mmol/L    Co2 26 20 - 33 mmol/L    Anion Gap 12.0 7.0 - 16.0    Glucose 158 (H) 65 - 99 mg/dL    Bun 39 (H) 8 - 22 mg/dL    Creatinine 6.06 (HH) 0.50 - 1.40 mg/dL    Calcium 9.2 8.5 - 10.5 mg/dL    AST(SGOT) 13 12 - 45 U/L    ALT(SGPT) 7 2 - 50 U/L    Alkaline Phosphatase 87 30 - 99 U/L    Total Bilirubin 0.4 0.1 - 1.5 mg/dL    Albumin 3.2 3.2 - 4.9 g/dL    Total Protein 6.8 6.0 - 8.2 g/dL    Globulin 3.6 (H) 1.9 - 3.5 g/dL    A-G Ratio 0.9 g/dL   CBC WITH DIFFERENTIAL    Collection Time: 11/19/20  2:54 AM   Result Value Ref Range    WBC 12.7 (H) 4.8 - 10.8 K/uL    RBC 2.31 (L) 4.70 - 6.10 M/uL    Hemoglobin 7.7 (L) 14.0 - 18.0 g/dL    Hematocrit 23.3 (L) 42.0 - 52.0 %    .9 (H) 81.4 - 97.8 fL    MCH 33.3 (H) 27.0 - 33.0 pg    MCHC 33.0 (L) 33.7 - 35.3 g/dL    RDW 52.1 (H) 35.9 - 50.0 fL    Platelet Count 380 164 - 446 K/uL    MPV 9.2 9.0 - 12.9 fL    Neutrophils-Polys 79.10 (H) 44.00  - 72.00 %    Lymphocytes 8.20 (L) 22.00 - 41.00 %    Monocytes 10.40 0.00 - 13.40 %    Eosinophils 0.00 0.00 - 6.90 %    Basophils 0.20 0.00 - 1.80 %    Immature Granulocytes 2.10 (H) 0.00 - 0.90 %    Nucleated RBC 0.20 /100 WBC    Neutrophils (Absolute) 10.07 (H) 1.82 - 7.42 K/uL    Lymphs (Absolute) 1.05 1.00 - 4.80 K/uL    Monos (Absolute) 1.33 (H) 0.00 - 0.85 K/uL    Eos (Absolute) 0.00 0.00 - 0.51 K/uL    Baso (Absolute) 0.02 0.00 - 0.12 K/uL    Immature Granulocytes (abs) 0.27 (H) 0.00 - 0.11 K/uL    NRBC (Absolute) 0.02 K/uL   ESTIMATED GFR    Collection Time: 11/19/20  2:54 AM   Result Value Ref Range    GFR If  11 (A) >60 mL/min/1.73 m 2    GFR If Non African American 9 (A) >60 mL/min/1.73 m 2   ACCU-CHEK GLUCOSE    Collection Time: 11/19/20  7:53 AM   Result Value Ref Range    Glucose - Accu-Ck 130 (H) 65 - 99 mg/dL       (click the triangle to expand results)    ASSESSMENT:  # ESRD    TTS iHD @ [facility]     Via [facility]       Has immature LUE AVF - follow up as outpt at [facility]   # R. Foot Gangrene   S/p amputation    abx   # Sepsis    Secondary to gangrene    Keflex PO, vanco    Vanco goal in ESRD is 15-20  # HTN, controlled   # Anemia of CKD   Hgb goal 10-11   Iron deplete but ferritin level precludes IV iron   ARMANDO with HD    Transfuse prn Hgb < 7.0  # CKD-MBD    Managed at HD unit   Continue phoslo, calcitriol   # HLD  # CAD  # Hyponatremia, mild   Manage with HD     PLAN:  - TTS iHD  - UF as tolerated  - Abx per primary  - Dose meds for ESRD  - Continue home PO4 binders  - ARMANDO with HD  - Neuro work up per primary team   - Transfuse today for hgb < 7.0  - Okay to transition to outpt hemodialysis when medically cleared

## 2020-11-19 NOTE — DISCHARGE PLANNING
1615 - Outpatient wound care appt set for 12/3/2020, check in at 1345 with LPS appointment scheduled the next day on 12/4.  Pt changed his mind regarding HH. HH choice verbalized by pt, HH choice faxed to [facility]. Pt to discharge tomorrow 11/19/2020.    Anticipated Discharge Disposition: Home    Action: Spoke to pt at bedside who states he does not want HH, states he has support at home, also, he has wound care appointments to go to, dialysis appts, and cardiology appts. Contacted [facility], they state pt will need a new referral for oupt wound care. Voalte Texted Dr. Beavers about new referral. JAN Comer notified.     Barriers to Discharge: New referral for Outpatient Wound Care.    Plan: If new referral is placed before 1700, call [facility] [place] [phone] to schedule pt appointment, if this can be done today pt can be dc'd today.

## 2020-11-19 NOTE — PROGRESS NOTES
74 male, full code    # R great toe wound x3d throbbing w/ clear drainage, black eschar, tenderness  - h/o severe PVD on plavix, DM A1c 7.8 history of s/p left toe amp, peripheral neuropathy, vasc surg RLE flow intact, WBC 15,  CRP elevated, LA/CPK wnl, CXR ?early PNA, XR toe soft tissue gas, no sxs of OM, S/p InD, first ray amputation, metatarsal bone bx, necrotic tissue past the area of anticipated amputation 11/16    # Atypical chest pain - denies  # CAD s/p LAD stent, elevated trop, EKG RBBB, LAFB no ischemia and sinus, TTE - improved LV fxn 55%, no wall motion, LAE mod, tricuspid, 40 RVSP  # p Afib on eliquis  # ESRD TTHS HD, phoslo  # Hyperthyroidism, Free T4 1.71  # HLD on atorva 40  # Hyperthy, methimazole  # Anemia of chronic disease      Dispo: PT/OT/wound care unable to bear weight, ortho ok for DC  Tele: Medical    Ams workup: Thiamine, EEG toxic metabolic encephalopathy, B12 477, RPR neg, HIV neg, ammonia wnl, UA not infected and UDS, CTH - no bleed, CTA h/n - all wnl, TSH 0.18, T4 1.7, ABG wnl  Tissue path - no osteo, unremarkable bone and cartilage  ? ID consult  Wound cultures  Bcx 11/13 NGTD   BL US with PATSY  Hb 6.5 s/p 1prbc  HD TTS  Ortho, no further plans  Initiated eliquis and plavix    Current Facility-Administered Medications:   •  apixaban  •  clopidogrel  •  cephALEXin  •  prochlorperazine  •  insulin lispro  •  senna-docusate **AND** polyethylene glycol/lytes **AND** magnesium hydroxide **AND** bisacodyl  •  acetaminophen  •  atorvastatin  •  calcitRIOL  •  calcium acetate  •  carvedilol  •  aspirin  •  omeprazole  •  methimazole **AND** methimazole  •  lidocaine  •  epoetin  •  oxyCODONE immediate-release **OR** [DISCONTINUED] oxyCODONE immediate-release  •  heparin  •  [DISCONTINUED] insulin regular **AND** POC Blood Glucose **AND** NOTIFY MD and PharmD **AND** glucose **AND** dextrose 50%

## 2020-11-19 NOTE — CARE PLAN
Problem: Communication  Goal: The ability to communicate needs accurately and effectively will improve  Outcome: PROGRESSING AS EXPECTED  Plan of care reviewed.      Problem: Infection  Goal: Will remain free from infection  Outcome: PROGRESSING AS EXPECTED   Afebrile.

## 2020-11-20 NOTE — PROGRESS NOTES
Hemodialysis ordered by Dr. Mahajan. Treatment started at 0814 and ended at 1144. Pt stable, vss, no c/o post tx. See flow sheets for details. Net UF 1.5 L. Reported to KENNY Pollard RN.

## 2020-11-20 NOTE — PROGRESS NOTES
Patient will be seen again by LPS, have wound care here tomorrow, and set up home health.  Will not discharge home tonight, d/w LAZARO and Dr. Chiu earlier.   Patient should follow up with Dr. Lynn next week, appreciate traction assistance for boot.

## 2020-11-20 NOTE — PROGRESS NOTES
"[facility] -  PROGRESS NOTE               Author: Floyd Mendoza M.D. Date & Time: 11/20/2020  11:49 AM     HPI:  75 yo M PMH ESRD TTS iHD, HTN, type 2 DM, anemia of CKD, and CKD-MBD, CAD s/p stents, IDDM, A. fib, hypothyroidism and GERD who presented to the [facility] ER with worsening toe gangrene. Has undelrying severe and s/p L great toe amputation in may 2020. Followed by vascular surgery outpatient with a repeat balloon angioplasty about 1 month ago. For last several days worse right toe pain with subjective fevers. Last HD on Thursday. No issues with access of electrolytes. In the ED was noted to have sepsis from gangrene toe promting admission.  No chest pain or shortness of breath.  Without fever, chills, sweats    DAILY NEPHROLOGY SUMMARY:  11/14: consult done  11/15: tolerated HD, pending R. Great toe amputation tomorrow   11/16: NAEO, taken down to OR this AM for amputation  11/17: sitting up in bed, feeling fine, just got EEG, had code neuro yesterday for slurred speech, head CT neg for acute changes, still being worked up, thought maybe narcotic related, for HD today   11/18: sitting up in bed eating breakfast, feels okay, low Hgb so getting RBCs, Neuro did EEG, feels metabolic/toxic encephalitis, no residual ALOC noted, tolerated HD yesterday with UF: 2.0L   11/19: seen on HD, feeling good, has some questions about 02 at home, advised to follow up with hosp MD  11/20: no labs today, patient doing well overall, discharge planning for today     REVIEW OF SYSTEMS:    GEN: No F/C  CV: No CP; No palpitations  RESP: No Cough; No SOB  GI: No pain; No N/V  MSK: Minimal pain  All other systems reviewed and are negative    PMH/PSH/SH/FH: Reviewed and unchanged since admission  CURRENT MEDICATIONS: Reviewed from admission to present day    PHYSICAL EXAM:  VS:  /71   Pulse 93   Temp 37.2 °C (99 °F) (Temporal)   Resp 16   Ht 1.676 m (5' 6\")   Wt 84.2 kg (185 lb 10 oz)   SpO2 " 94%   BMI 29.96 kg/m²   GEN: WDWN NAD  HENT: NC/AT; atraumatic external nose  EYES: No icterus  CVS: RRR; No m/r  RESP: CTA B, non-labored  GI: SNTND; +BS  MSK: Right foot with dressing at the amputation site  SKIN: No rash    Fluids:  In: 740 [P.O.:240; Dialysis:500]  Out: 2000     LABS:  Recent Results (from the past 24 hour(s))   ACCU-CHEK GLUCOSE    Collection Time: 11/19/20 12:44 PM   Result Value Ref Range    Glucose - Accu-Ck 159 (H) 65 - 99 mg/dL   ACCU-CHEK GLUCOSE    Collection Time: 11/19/20  5:27 PM   Result Value Ref Range    Glucose - Accu-Ck 189 (H) 65 - 99 mg/dL   ACCU-CHEK GLUCOSE    Collection Time: 11/20/20  6:32 AM   Result Value Ref Range    Glucose - Accu-Ck 155 (H) 65 - 99 mg/dL       (click the triangle to expand results)    IMPRESSION:  - ESRD    * TTS iHD @ [facility]    * HD here Via [facility]     * Pt has immature LUE AVF  - R. Foot Gangrene    * S/p great toe amputation 11/16/2020    * On cephalexin  - Sepsis    * Source likely due to R foot gangrene    * Antibiotics per rimary  - HTN    * Controlled  - Anemia of CKD    * Hgb goal 10-11    * ARMANDO with HD     * Transfuse hgb<7.0  - CAD  - Hyponatremia, mild  - Atrial fibrillation on Apixaban  - Hyperthryodism on methimazole    PLAN:  - TTS iHD  - Continue home PO4 binder  - ARMANDO with HD  - Dose all meds per eGFR < 15  - Antibiotics per primary  - Will follow patient closely

## 2020-11-20 NOTE — CARE PLAN
Problem: Knowledge Deficit  Goal: Knowledge of disease process/condition, treatment plan, diagnostic tests, and medications will improve  Outcome: PROGRESSING AS EXPECTED     Problem: Safety  Goal: Will remain free from injury  Outcome: PROGRESSING AS EXPECTED     Problem: Communication  Goal: The ability to communicate needs accurately and effectively will improve  Outcome: PROGRESSING AS EXPECTED

## 2020-11-20 NOTE — PROGRESS NOTES
" [facility]   POST SURGICAL PROGRESS NOTE      HPI:  74 y.o. male with past medical history that includes  diabetes,  L great toe amputation with tenotomy , PVD, CKD on dialysis, CHF, CAD with stents and anticoagulation, admitted 11/13/2020 for Diabetic foot infection (HCC).  An LPS consult has been requested for evaluation of R great toe and L plantar foot callus.  This wound started about 10/14/2020.  He has been treating the wound with betadine.    Patient is established with Dr. Encinas.  Recently had atherectomy about 3 weeks prior to this admission at [facility] vascular.  Per patient, Dr. Encinas reports that patient has all vessels open to the foot.  Last saw Dr. Encinas on 11/13/2020.  Gangrene to right great toe started to worsen.  Patient followed up with Dr. Lynn and was recommended to come to ED for antibiotic therapy and toe amputation.      SURGERY DATE: 11/16/2020 by Dr. Lynn  PROCEDURE: 1.  Right foot irrigation and debridement, multiple compartments.  2.  Right first ray amputation.  3.    Right metatarsal bone biopsy.      INTERVAL HISTORY:  11/18/2020: POD #2.  Patient denies fevers, chills, nausea, vomiting.  Pain well controlled.   11/18/2020: POD #3.  Denies fevers, chills, nausea, vomiting.  Seen with nursing during dressing change.    PERTINENT LPS RESULTS:   Pathology 11/16/2020:  First metatarsal bone:          Unremarkable bone and cartilage with no evidence of acute           osteomyelitis.       SURGICAL SITE EXAM:      /57   Pulse 83   Temp 36.2 °C (97.1 °F) (Temporal)   Resp 16   Ht 1.676 m (5' 6\")   Wt 84.2 kg (185 lb 10 oz)   SpO2 96%   BMI 29.96 kg/m²     Pedal Pulses: Unable to palpate pedal pulses.  With Doppler biphasic tones noted to PT/DP bilaterally  Sensation: Insensate  Surgical foot warm    Right great toe amputation  Incision approximated, sutures intact except to middle of incision where there is dehiscence.  Area measures 0.5 x 0.5 x 0.2 cm. " "  Increased incisional necrosis  Minimal erythema  Minimal edema  No new drainage  No maceration  Gray discolored area to plantar aspect has now started to necrose, thin layer of eschar forming              Wound care: Completed by RN.  Applied single layer Adaptic, dry gauze, roll gauze, Ace wrap.      DIABETES MANAGEMENT:    Blood glucose:   Results from last 7 days   Lab Units 11/19/20  1727 11/19/20  1244 11/19/20  0753 11/18/20  1618 11/18/20  1139 11/18/20  0623 11/17/20  1659 11/17/20  1135   ACCU CHECK GLUCOSE 788 mg/dL 189* 159* 130* 181* 188* 153* 106* 125*     A1c:   Lab Results   Component Value Date/Time    HBA1C 7.8 (H) 11/14/2020 02:39 AM            INFECTION MANAGEMENT:    Results from last 7 days   Lab Units 11/19/20  0254 11/18/20  0252 11/17/20  0215 11/17/20  0001 11/16/20  1915 11/16/20  0327   WBC 1501 K/uL 12.7* 13.9* 15.1* 14.5* 13.7* 13.0*   PLATELET COUNT 1518 K/uL 380 340 380 347 362 399     Wound culture results:   Results     Procedure Component Value Units Date/Time    BLOOD CULTURE [[id number]] Collected: 11/13/20 2240    Order Status: Completed Specimen: Blood from Peripheral Updated: 11/19/20 0300     Significant Indicator NEG     Source BLD     Site PERIPHERAL     Culture Result No growth after 5 days of incubation.    Narrative:      Droplet, Contact, and Eye Protection  2 of 2 blood culture x2  Sites order. Per Hospital Policy:  Only change Specimen Src: to \"Line\" if specified by physician  order.  Right Wrist    BLOOD CULTURE [[id number]] Collected: 11/13/20 2228    Order Status: Completed Specimen: Blood from Peripheral Updated: 11/19/20 0300     Significant Indicator NEG     Source BLD     Site PERIPHERAL     Culture Result No growth after 5 days of incubation.    Narrative:      Droplet, Contact, and Eye Protection  1 of 2 for Blood Culture x 2 sites order. Per Hospital  Policy: Only change Specimen Src: to \"Line\" if specified by  physician order.  Right Forearm/Arm    BLOOD " "CULTURE [[id number]] Collected: 11/17/20 0001    Order Status: Completed Specimen: Blood from Peripheral Updated: 11/18/20 0732     Significant Indicator NEG     Source BLD     Site PERIPHERAL     Culture Result No Growth  Note: Blood cultures are incubated for 5 days and  are monitored continuously.Positive blood cultures  are called to the RN and reported as soon as  they are identified.      Narrative:      Per Hospital Policy: Only change Specimen Src: to \"Line\" if  specified by physician order.  Right Hand    BLOOD CULTURE [[id number]] Collected: 11/17/20 0056    Order Status: Completed Specimen: Blood from Peripheral Updated: 11/18/20 0732     Significant Indicator NEG     Source BLD     Site PERIPHERAL     Culture Result No Growth  Note: Blood cultures are incubated for 5 days and  are monitored continuously.Positive blood cultures  are called to the RN and reported as soon as  they are identified.      Narrative:      Per Hospital Policy: Only change Specimen Src: to \"Line\" if  specified by physician order.  Right Hand    URINALYSIS [[id number]]  (Abnormal) Collected: 11/18/20 0454    Order Status: Completed Specimen: Urine, Clean Catch Updated: 11/18/20 0545     Color Yellow     Character Clear     Specific Gravity 1.022     Ph 7.5     Glucose 250 mg/dL      Ketones Negative mg/dL      Protein 100 mg/dL      Bilirubin Negative     Urobilinogen, Urine 0.2     Nitrite Negative     Leukocyte Esterase Negative     Occult Blood Negative     Micro Urine Req Microscopic    Narrative:      Collected By:[id number] CONSUELO GIANG    URINALYSIS [[id number]] Collected: 11/18/20 0454    Order Status: Canceled Specimen: Urine, Clean Catch     CoV-2, Flu A/B, And RSV by PCR [[id number]] Collected: 11/13/20 2248    Order Status: Completed Updated: 11/14/20 0028     Influenza virus A RNA Negative     Influenza virus B, PCR Negative     RSV, PCR Negative     SARS-CoV-2 by PCR NotDetected     Comment: PATIENTS: Important " "information regarding your results and instructions can  be found at [email or web address]   \"After your  Covid-19 Test\"  [facility] providers: PLEASE REFER TO DE-ESCALATION AND RETESTING PROTOCOL  on [email or web address]  **The [employer] GeneXpert Xpress SARS-CoV-2 Test has been made available for  use under the Emergency Use Authorization (EUA) only.          SARS-CoV-2 Source NP Swab    Narrative:      Droplet, Contact, and Eye Protection  Rule-out COVID-19 panel, includes droplet/contact/eye  isolation order.  Check influenza to order this test only if  specifically indicated.  Is patient being admitted?->Yes  Does this patient meet criteria for Rush/Cepheid per [facility]  Inpatient Workflow? (See workflow link below)->Yes  Expected turn around time?->Rush (Cepheid 2-4 hours)  Is this the patients First SARS CoV-2 test?->No  Is this patient employed in healthcare?->No  Is the patient symptomatic as defined by the CDC?->Yes  Date of symptom onset?->11/12/20  Is the patient hospitalized?->No  Is the patient a resident in a congregate care setting?->No  Is the patient pregnant?->No    COVID/SARS CoV-2 PCR [[id number]] Collected: 11/13/20 2248    Order Status: Completed Specimen: Respirate from Nasopharyngeal Updated: 11/13/20 2301     COVID Order Status Received     Comment: The order for SARS CoV-2 testing has been received by the  Laboratory. This result is neither positive nor negative.  Final results of testing will report in 24-48 hours, separately.         Narrative:      Droplet, Contact, and Eye Protection  Rule-out COVID-19 panel, includes droplet/contact/eye  isolation order.  Check influenza to order this test only if  specifically indicated.  Is patient being admitted?->Yes  Does this patient meet criteria for Rush/Cepheid per [facility]  Inpatient Workflow? (See workflow link below)->Yes  Expected turn around time?->Rush (Cepheid 2-4 hours)  Is this the patients First SARS CoV-2 test?->No  Is " this patient employed in healthcare?->No  Is the patient symptomatic as defined by the CDC?->Yes  Date of symptom onset?->11/12/20  Is the patient hospitalized?->No  Is the patient a resident in a congregate care setting?->No  Is the patient pregnant?->No    URINALYSIS [[id number]]     Order Status: Canceled Specimen: Urine                ASSESSMENT/PLAN:   POD #3 S/P right great toe amputation through MTPJ with bone biopsy of metatarsal head.  Ischemic changes to incision and plantar first MTH have worsened.  Notified Dr. Lynn.  We will continue to monitor amputation site at this time and plan for possible revision in outpatient setting.  Recommend antibiotics, offloading, wound care.    Wound care:   - wound care orders updated for nursing  -Right great toe amp: Adaptic, dry gauze, roll gauze, Ace wrap.  Change daily.  Okay to be changed 3 times a week with home health.    Vascular status:   -Biphasic tones with Doppler to PT/DP bilaterally  Followed by Dr. Encinas.  Recommend follow-up with Dr. Encinas next week    Antibiotics:   -ID: Not involved  Pathology negative  On Keflex renal dosed for HD.  Discussed with hospitalist.  Recommend extension of antibiotics for 2 weeks for soft tissue infection      Plan to return to O.R.:   -no further surgeries planned at this time   will plan for possible revision in outpatient setting.    Weight Bearing Status:   -Heel weight bearing    Offloading:   Diabetic offloading boot ordered.  To be worn when ambulating.  -Orthotic company: None will need prescription once amputation site has healed.      PT/OT :   -involved, last seen 11/18/2020    Diabetes Education:   Not involved this admission, declined    - Implications of loss of protective sensation (LOPS) discussed with patient- including increased risk for amputation.  Advised to check feet at least daily, moisturize feet, and to always wear protective foot wear.   -avoid trimming own nails. See podiatrist or certified  foot and nail RN  -keep blood sugars <150 for improved wound healing          DISCHARGE PLAN:    Disposition: Home.  Okay to discharge from LPS/Ortho standpoint once medically cleared.      Recommend home health and outpatient wound care clinic  Follow-up wound care clinic first appointment 12/3/2020.  LPS rounds 12/4/2020.  Orders written for home health for dressing change    Follow-up: Dr. Lynn next week to reevaluate for possible revision.  Sutures to be removed approximately 3 weeks post-op  Follow-up with Dr. Encinas next week    Discussed with: pt, RN, Dr. Lynn, [facility] RN case manager      Please note that this dictation was created using voice recognition software. I have  worked with technical experts from [facility] to optimize the interface.  I have made every reasonable attempt to correct obvious errors, but there may be errors of grammar and possibly content that I did not discover before finalizing the note.      Carla Farah, PETRONA.P.R.N.    If any questions or concerns, please contact [facility] through voalte or tiger text.

## 2020-11-20 NOTE — DISCHARGE INSTRUCTIONS
Discharge Instructions    Discharged to home by car with relative. Discharged via wheelchair, hospital escort: Yes.  Special equipment needed: none    Be sure to schedule a follow-up appointment with your primary care doctor or any specialists as instructed.     Discharge Plan:   Influenza Vaccine Indication: Patient Refuses    I understand that a diet low in cholesterol, fat, and sodium is recommended for good health. Unless I have been given specific instructions below for another diet, I accept this instruction as my diet prescription.   Other diet: renal diet    Special Instructions: Discharge instructions for the Orthopedic Patient    Follow up with Primary Care Physician within 2 weeks of discharge to home, regarding:  Review of medications and diagnostic testing.  Surveillance for medical complications.  Workup and treatment of osteoporosis, if appropriate.     -Is this a Hip/Knee/Shoulder Joint Replacement patient? No    -Is this patient being discharged with medication to prevent blood clots?  No    · Is patient discharged on Warfarin / Coumadin?   No     Repeat Outpatient chest xray with PCP  Update labs sodium and potassium levels    Right great toe amputation: Clean with NS.  Pat dry.  Apply single layer Adaptic over incision site and extending to plantar first metatarsal wound.  Cover with dry gauze, roll gauze, Ace wrap.  Change dressing daily and as needed drainage.        Toe Amputation, Care After  This sheet gives you information about how to care for yourself after your procedure. Your health care provider may also give you more specific instructions. If you have problems or questions, contact your health care provider.  What can I expect after the procedure?  After the procedure, it is common to have some pain. Pain usually improves within a week.  Follow these instructions at home:  Medicines  · Take over-the-counter and prescription medicines only as told by your health care provider.  · If you  were prescribed an antibiotic medicine, take it as told by your health care provider. Do not stop taking the antibiotic even if you start to feel better.  · Ask your health care provider if the medicine prescribed to you:  ? Requires you to avoid driving or using heavy machinery.  ? Can cause constipation. You may need to take actions to prevent or treat constipation, such as:  § Drink enough fluid to keep your urine pale yellow.  § Take over-the-counter or prescription medicines.  § Eat foods that are high in fiber, such as beans, whole grains, and fresh fruits and vegetables.  § Limit foods that are high in fat and processed sugars, such as fried or sweet foods.  Managing pain, stiffness, and swelling    · If directed, put ice on the painful area.  ? Put ice in a plastic bag.  ? Place a towel between your skin and the bag.  ? Leave the ice on for 20 minutes, 2-3 times a day.  · Move your other toes often to reduce stiffness and swelling.  · Raise (elevate) your foot above the level of your heart while you are sitting or lying down.  Incision care         · Follow instructions from your health care provider about how to take care of your incision. Make sure you:  ? Wash your hands with soap and water before and after you change your bandage (dressing). If soap and water are not available, use hand .  ? Change your dressing as told by your health care provider.  ? Leave stitches (sutures), skin glue, or adhesive strips in place. These skin closures may need to stay in place for 2 weeks or longer. If adhesive strip edges start to loosen and curl up, you may trim the loose edges. Do not remove adhesive strips completely unless your health care provider tells you to do that.  · Check your incision area every day for signs of infection. Check for:  ? More redness, swelling, or pain.  ? Fluid or blood.  ? Warmth.  ? Pus or a bad smell.  Bathing  · Do not take baths, swim, use a hot tub, or soak your foot until  your health care provider approves. Ask your health care provider if you may take showers. You may only be allowed to take sponge baths.  · If your dressing has been removed, you may wash your skin with warm water and soap.  Activity  · Rest as told by your health care provider.  · Avoid sitting for a long time without moving. Get up to take short walks every 1-2 hours. This is important to improve blood flow and breathing. Ask for help if you feel weak or unsteady.  · If you have been given crutches, use them as told by your health care provider.  · Do exercises as told by your health care provider.  · Return to your normal activities as told by your health care provider. Ask your health care provider what activities are safe for you.  General instructions  · Do not drive for 24 hours if you were given a sedative during your procedure.  · Do not use any products that contain nicotine or tobacco, such as cigarettes, e-cigarettes, and chewing tobacco. If you need help quitting, ask your health care provider.  · Ask your health care provider about wearing supportive shoes or using inserts to help with your balance when walking.  · If you have diabetes, keep your blood sugar under good control and check your feet daily for sores or open areas.  · Keep all follow-up visits as told by your health care provider. This is important.  Contact a health care provider if:  · You have signs of infection at your incision, such as:  ? Redness, swelling, or pain.  ? Fluid or blood.  ? Warmth.  ? Pus or a bad smell.  · You have a fever or chills.  · Your dressing is soaked with blood.  · Your sutures tear or they separate.  · You have numbness or tingling in your toes or foot.  · Your foot is cool or pale, or it changes color.  · Your pain does not improve after you take your medicine.  Get help right away if you have:  · Pain or swelling near your incision that gets worse or does not go away.  · Red streaks on your skin near your  toes, foot, or leg.  · Pain in your calf or behind your knee.  · Shortness of breath.  · Chest pain.  Summary  · After the procedure, it is common to have some pain. Pain usually improves within a week.  · If you were prescribed an antibiotic medicine, take it as told by your health care provider. Do not stop taking the antibiotic even if you start to feel better.  · Change your dressing as told by your health care provider.  · Contact a health care provider if you have signs of infection.  · Keep all follow-up visits as told by your health care provider. This is important.  This information is not intended to replace advice given to you by your health care provider. Make sure you discuss any questions you have with your health care provider.  Document Released: 11/28/2016 Document Revised: 04/08/2020 Document Reviewed: 12/10/2019  [employer] Patient Education © 2020 [employer]    Pleural Effusion  You have a pleural effusion. This means you have fluid in the space between your lung and your chest wall. This condition may result from many different problems including:  · Pleurisy or inflammation of the lining of the lung.   · Lung infections such as pneumonia.   · Blood clots, heart disease, chest injuries, cancer, and other medical problems.   A pleural effusion may not cause any symptoms. If it is large, however, it can make you short of breath. Other symptoms include cough, chest pain, or fever. The diagnosis of a pleural effusion is most often made by chest X-ray, CT, or ultrasound studies. A sample of the fluid can be taken using local anesthetic and a needle or catheter placed between the ribs. This procedure may be needed to determine the cause of the effusion, or to treat a large effusion.   Treatment is specific to the cause, such as antibiotics for infections. Mild pain or anti-inflammatory medicines may be helpful in relieving symptoms. Chronic effusions often have to be periodically drained. It is very  important that you see your doctor or a specialist to be sure the effusion is not from a serious medical condition. A follow-up chest X-ray is usually needed.   SEEK IMMEDIATE MEDICAL CARE IF:  · You develop shortness of breath.   · You develop increased pain.   · You develop a high fever.   Document Released: 01/25/2006 Document Revised: 03/11/2013 Document Reviewed: 12/18/2006  [employer]® Patient Information ©2013 [employer].    Depression / Suicide Risk    As you are discharged from this [facility] facility, it is important to learn how to keep safe from harming yourself.    Recognize the warning signs:  · Abrupt changes in personality, positive or negative- including increase in energy   · Giving away possessions  · Change in eating patterns- significant weight changes-  positive or negative  · Change in sleeping patterns- unable to sleep or sleeping all the time   · Unwillingness or inability to communicate  · Depression  · Unusual sadness, discouragement and loneliness  · Talk of wanting to die  · Neglect of personal appearance   · Rebelliousness- reckless behavior  · Withdrawal from people/activities they love  · Confusion- inability to concentrate     If you or a loved one observes any of these behaviors or has concerns about self-harm, here's what you can do:  · Talk about it- your feelings and reasons for harming yourself  · Remove any means that you might use to hurt yourself (examples: pills, rope, extension cords, firearm)  · Get professional help from the community (Mental Health, Substance Abuse, psychological counseling)  · Do not be alone:Call your Safe Contact- someone whom you trust who will be there for you.  · Call your local CRISIS HOTLINE 081-7250 or 667-735-7169  · Call your local Children's Mobile Crisis Response Team [place] (799) 318-0888 or www.clickTRUE  · Call the toll free National Suicide Prevention Hotlines   · National Suicide Prevention Lifeline 172-184-VYZN  (8581)  · [facility] 800-SUICIDE (429-0796)

## 2020-11-20 NOTE — DISCHARGE PLANNING
Agency/Facility Name: [facility]  Spoke To: En   Outcome: Requested [facility] to refax wound note for review

## 2020-11-20 NOTE — DISCHARGE SUMMARY
Discharge Summary    CHIEF COMPLAINT ON ADMISSION  Chief Complaint   Patient presents with   • Wound Check   • Sent by MD       Reason for Admission  RLE wound    Admission Date  11/13/2020    CODE STATUS  Full Code    HPI & HOSPITAL COURSE  See d/c note dated 11/19/2020.  CXR early this am for mild hypoxia, shows trace bilateral pleural effusions.  Will have patient take Lasix for three days and f/u with PCP next week.  Pt declines oxygen, states he wants d/c home today.    Therefore, he is discharged in good and stable condition to home with close outpatient follow-up.    The patient met 2-midnight criteria for an inpatient stay at the time of discharge.    Discharge Date  11/20/2020    FOLLOW UP ITEMS POST DISCHARGE  Outpatient CXR with PCP  Update labs Na, K levels    DISCHARGE DIAGNOSES  Principal Problem:    Wound of right foot POA: Yes  Active Problems:    Anemia of chronic disease POA: Yes    End stage renal disease on dialysis (HCC) POA: Yes    Peripheral vascular disease (HCC) POA: Yes    Hyperthyroidism POA: Yes    CAD (coronary artery disease) POA: Yes      Overview: Stents to proximal and mid LAD in [facility].    Essential hypertension POA: Yes    Gastroesophageal reflux disease POA: Yes    Dyslipidemia POA: Yes    Diabetes mellitus type 2 in nonobese (HCC) POA: Yes    Secondary hyperparathyroidism of renal origin (HCC) POA: Yes    Paroxysmal A-fib (HCC) POA: Yes  Resolved Problems:    Altered mental status POA: Yes    Chest pain POA: Yes      FOLLOW UP  Future Appointments   Date Time Provider Department Center   12/2/2020 11:00 AM Kulwant Osborn M.D. [facility] None   12/3/2020  2:00 PM TABATHA Infante [facility]   12/4/2020  8:00 AM Leobardo Lynn M.D. [facility].     Patito Edgar M.D.  [address] [place] [id number] [phone]    In 1 week      Mario Encinas M.D.  [address] [place] [address]  [phone]    In 1 week  For wound re-check, If  symptoms worsen    Leobardo Lynn M.D.  [address] [place] [id number] [phone]    In 1 week  For wound re-check      MEDICATIONS ON DISCHARGE     Medication List      START taking these medications      Instructions   aspirin 81 MG Chew chewable tablet  Commonly known as: ASA   Chew 1 Tab every day.  Dose: 81 mg     Atrovent HFA 17 MCG/ACT Aers  Generic drug: ipratropium   Inhale 2 Puffs every 6 hours.  Dose: 2 Puff     cephALEXin 500 MG Caps  Commonly known as: KEFLEX   Take 1 Cap by mouth every day for 14 days.  Dose: 500 mg     furosemide 20 MG Tabs  Commonly known as: LASIX   Take 1 Tab by mouth every day for 3 days.  Dose: 20 mg        CHANGE how you take these medications      Instructions   * calcitRIOL 0.25 MCG Caps  What changed: Another medication with the same name was added. Make sure you understand how and when to take each.  Commonly known as: ROCALTROL   Take 0.25 mcg by mouth every day.  Dose: 0.25 mcg     * calcitRIOL 0.25 MCG Caps  What changed: You were already taking a medication with the same name, and this prescription was added. Make sure you understand how and when to take each.  Commonly known as: ROCALTROL   Take 1 Cap by mouth every day.  Dose: 0.25 mcg     * calcium acetate 667 MG Caps  What changed: Another medication with the same name was added. Make sure you understand how and when to take each.  Commonly known as: PHOS-LO   Take 1,334 mg by mouth 3 times a day, with meals.  Dose: 1,334 mg     * calcium acetate 667 MG Tabs tablet  What changed: Another medication with the same name was added. Make sure you understand how and when to take each.  Commonly known as: PHOS-LO      * calcium acetate 667 MG Tabs tablet  What changed: You were already taking a medication with the same name, and this prescription was added. Make sure you understand how and when to take each.  Commonly known as: PHOS-LO   Take 1 Tab by mouth 3 times a day before meals.  Dose: 667 mg     *  carvedilol 3.125 MG Tabs  What changed: Another medication with the same name was added. Make sure you understand how and when to take each.  Commonly known as: COREG   Take 3.125 mg by mouth 2 times a day, with meals.  Dose: 3.125 mg     * carvedilol 3.125 MG Tabs  What changed: You were already taking a medication with the same name, and this prescription was added. Make sure you understand how and when to take each.  Commonly known as: COREG   Take 1 Tab by mouth 2 times a day, with meals.  Dose: 3.125 mg     * clopidogrel 75 MG Tabs  What changed: Another medication with the same name was added. Make sure you understand how and when to take each.  Commonly known as: PLAVIX   Doctor's comments: Requesting 1 year supply  TAKE 1 TABLET BY MOUTH  DAILY     * clopidogrel 75 MG Tabs  What changed: You were already taking a medication with the same name, and this prescription was added. Make sure you understand how and when to take each.  Commonly known as: PLAVIX   Take 1 Tab by mouth every day.  Dose: 75 mg     * methimazole 5 MG Tabs  What changed: Another medication with the same name was added. Make sure you understand how and when to take each.  Commonly known as: TAPAZOLE   TAKE 1.5 TABLETS BY MOUTH  IN THE MORNING AND 1 TABLET IN THE  EVENING     * methimazole 5 MG Tabs  What changed: You were already taking a medication with the same name, and this prescription was added. Make sure you understand how and when to take each.  Commonly known as: TAPAZOLE   Take 1 Tab by mouth every evening.  Dose: 5 mg     * methimazole 5 MG Tabs  What changed: You were already taking a medication with the same name, and this prescription was added. Make sure you understand how and when to take each.  Commonly known as: TAPAZOLE   Take 1.5 Tabs by mouth every morning.  Dose: 7.5 mg         * This list has 12 medication(s) that are the same as other medications prescribed for you. Read the directions carefully, and ask your doctor  or other care provider to review them with you.            CONTINUE taking these medications      Instructions   apixaban 2.5mg Tabs  Commonly known as: Eliquis   Take 1 Tab by mouth 2 Times a Day.  Dose: 2.5 mg     atorvastatin 40 MG Tabs  Commonly known as: LIPITOR   Take 1 Tab by mouth every bedtime.  Dose: 40 mg     Lantus SoloStar 100 UNIT/ML Sopn injection  Generic drug: insulin glargine   Inject 5 Units as instructed every bedtime.  Dose: 5 Units     ondansetron 4 MG Tabs tablet  Commonly known as: ZOFRAN   Take 1 Tab by mouth.  Dose: 1 Tab     ondansetron 4 MG Tbdp  Commonly known as: ZOFRAN ODT      Protonix 40 MG Tbec  Generic drug: pantoprazole   Take 40 mg by mouth every bedtime.  Dose: 40 mg            Allergies  Allergies   Allergen Reactions   • Mircera [Methoxy Polyethylene Glycol-Epoetin Beta] Hives       DIET  Orders Placed This Encounter   Procedures   • Diet Order Diet: Renal     Standing Status:   Standing     Number of Occurrences:   1     Order Specific Question:   Diet:     Answer:   Renal [8]           LABORATORY  Lab Results   Component Value Date    SODIUM 128 (L) 11/19/2020    POTASSIUM 3.7 11/19/2020    CHLORIDE 90 (L) 11/19/2020    CO2 26 11/19/2020    GLUCOSE 158 (H) 11/19/2020    BUN 39 (H) 11/19/2020    CREATININE 6.06 (HH) 11/19/2020        Lab Results   Component Value Date    WBC 12.7 (H) 11/19/2020    HEMOGLOBIN 7.7 (L) 11/19/2020    HEMATOCRIT 23.3 (L) 11/19/2020    PLATELETCT 380 11/19/2020

## 2020-11-20 NOTE — PROGRESS NOTES
Pt being dc'd to [facility], [facility], dressing care supplies and boot.. The following prescriptions given none-escripted. Deepali Chiu is to escript oxycodone. IV dc'd. Dressing to foot changed. HHC to change dressing this weekend. Dc instructions discussed. All questions answered.  Patient agreeable to dc plan. Patient has all belongings at time of dc. Family to transport home at 1350.

## 2020-11-20 NOTE — PROGRESS NOTES
" [facility]   POST SURGICAL PROGRESS NOTE      HPI:  74 y.o. male with past medical history that includes  diabetes,  L great toe amputation with tenotomy , PVD, CKD on dialysis, CHF, CAD with stents and anticoagulation, admitted 11/13/2020 for Diabetic foot infection (HCC).  An LPS consult has been requested for evaluation of R great toe and L plantar foot callus.  This wound started about 10/14/2020.  He has been treating the wound with betadine.    Patient is established with Dr. Encinas.  Recently had atherectomy about 3 weeks prior to this admission at [facility] vascular.  Per patient, Dr. Encinas reports that patient has all vessels open to the foot.  Last saw Dr. Encinas on 11/13/2020.  Gangrene to right great toe started to worsen.  Patient followed up with Dr. Lynn and was recommended to come to ED for antibiotic therapy and toe amputation.      SURGERY DATE: 11/16/2020 by Dr. Lynn  PROCEDURE: 1.  Right foot irrigation and debridement, multiple compartments.  2.  Right first ray amputation.  3.    Right metatarsal bone biopsy.      INTERVAL HISTORY:  11/18/2020: POD #2.  Patient denies fevers, chills, nausea, vomiting.  Pain well controlled.   11/19/2020: POD #3.  Denies fevers, chills, nausea, vomiting.  Seen with nursing during dressing change.  11/20/2020: POD #4.  Patient planning discharge today.  Denies fevers, chills, nausea, vomiting.      PERTINENT LPS RESULTS:   Pathology 11/16/2020:  First metatarsal bone:          Unremarkable bone and cartilage with no evidence of acute           osteomyelitis.       SURGICAL SITE EXAM:      /71   Pulse 93   Temp 37.2 °C (99 °F) (Temporal)   Resp 16   Ht 1.676 m (5' 6\")   Wt 84.2 kg (185 lb 10 oz)   SpO2 94%   BMI 29.96 kg/m²     Pedal Pulses: Unable to palpate pedal pulses.  With Doppler biphasic tones noted to PT/DP bilaterally  Sensation: Insensate  Surgical foot warm    Right great toe amputation  Dressing clean dry " "intact      DIABETES MANAGEMENT:    Blood glucose:   Results from last 7 days   Lab Units 11/20/20  1211 11/20/20  0632 11/19/20  1727 11/19/20  1244 11/19/20  0753 11/18/20  1618 11/18/20  1139 11/18/20  0623   ACCU CHECK GLUCOSE 788 mg/dL 234* 155* 189* 159* 130* 181* 188* 153*     A1c:   Lab Results   Component Value Date/Time    HBA1C 7.8 (H) 11/14/2020 02:39 AM            INFECTION MANAGEMENT:    Results from last 7 days   Lab Units 11/19/20  0254 11/18/20  0252 11/17/20  0215 11/17/20  0001 11/16/20  1915 11/16/20  0327   WBC 1501 K/uL 12.7* 13.9* 15.1* 14.5* 13.7* 13.0*   PLATELET COUNT 1518 K/uL 380 340 380 347 362 399     Wound culture results:   Results     Procedure Component Value Units Date/Time    BLOOD CULTURE [[id number]] Collected: 11/13/20 2240    Order Status: Completed Specimen: Blood from Peripheral Updated: 11/19/20 0300     Significant Indicator NEG     Source BLD     Site PERIPHERAL     Culture Result No growth after 5 days of incubation.    Narrative:      Droplet, Contact, and Eye Protection  2 of 2 blood culture x2  Sites order. Per Hospital Policy:  Only change Specimen Src: to \"Line\" if specified by physician  order.  Right Wrist    BLOOD CULTURE [[id number]] Collected: 11/13/20 2228    Order Status: Completed Specimen: Blood from Peripheral Updated: 11/19/20 0300     Significant Indicator NEG     Source BLD     Site PERIPHERAL     Culture Result No growth after 5 days of incubation.    Narrative:      Droplet, Contact, and Eye Protection  1 of 2 for Blood Culture x 2 sites order. Per Hospital  Policy: Only change Specimen Src: to \"Line\" if specified by  physician order.  Right Forearm/Arm    BLOOD CULTURE [[id number]] Collected: 11/17/20 0001    Order Status: Completed Specimen: Blood from Peripheral Updated: 11/18/20 0732     Significant Indicator NEG     Source BLD     Site PERIPHERAL     Culture Result No Growth  Note: Blood cultures are incubated for 5 days and  are monitored " "continuously.Positive blood cultures  are called to the RN and reported as soon as  they are identified.      Narrative:      Per Hospital Policy: Only change Specimen Src: to \"Line\" if  specified by physician order.  Right Hand    BLOOD CULTURE [[id number]] Collected: 11/17/20 0056    Order Status: Completed Specimen: Blood from Peripheral Updated: 11/18/20 0732     Significant Indicator NEG     Source BLD     Site PERIPHERAL     Culture Result No Growth  Note: Blood cultures are incubated for 5 days and  are monitored continuously.Positive blood cultures  are called to the RN and reported as soon as  they are identified.      Narrative:      Per Hospital Policy: Only change Specimen Src: to \"Line\" if  specified by physician order.  Right Hand    URINALYSIS [[id number]]  (Abnormal) Collected: 11/18/20 0454    Order Status: Completed Specimen: Urine, Clean Catch Updated: 11/18/20 0545     Color Yellow     Character Clear     Specific Gravity 1.022     Ph 7.5     Glucose 250 mg/dL      Ketones Negative mg/dL      Protein 100 mg/dL      Bilirubin Negative     Urobilinogen, Urine 0.2     Nitrite Negative     Leukocyte Esterase Negative     Occult Blood Negative     Micro Urine Req Microscopic    Narrative:      Collected By:[id number] CONSUELO CARTERANDREY RAHAT    URINALYSIS [[id number]] Collected: 11/18/20 0454    Order Status: Canceled Specimen: Urine, Clean Catch     CoV-2, Flu A/B, And RSV by PCR [[id number]] Collected: 11/13/20 2248    Order Status: Completed Updated: 11/14/20 0028     Influenza virus A RNA Negative     Influenza virus B, PCR Negative     RSV, PCR Negative     SARS-CoV-2 by PCR NotDetected     Comment: PATIENTS: Important information regarding your results and instructions can  be found at [email or web address]   \"After your  Covid-19 Test\"  [facility] providers: PLEASE REFER TO DE-ESCALATION AND RETESTING PROTOCOL  on [email or web address]  **The [employer] GeneXpert Xpress SARS-CoV-2 Test has " been made available for  use under the Emergency Use Authorization (EUA) only.          SARS-CoV-2 Source NP Swab    Narrative:      Droplet, Contact, and Eye Protection  Rule-out COVID-19 panel, includes droplet/contact/eye  isolation order.  Check influenza to order this test only if  specifically indicated.  Is patient being admitted?->Yes  Does this patient meet criteria for Rush/Cepheid per [facility]  Inpatient Workflow? (See workflow link below)->Yes  Expected turn around time?->Rush (Cepheid 2-4 hours)  Is this the patients First SARS CoV-2 test?->No  Is this patient employed in healthcare?->No  Is the patient symptomatic as defined by the CDC?->Yes  Date of symptom onset?->11/12/20  Is the patient hospitalized?->No  Is the patient a resident in a congregate care setting?->No  Is the patient pregnant?->No    COVID/SARS CoV-2 PCR [[id number]] Collected: 11/13/20 0206    Order Status: Completed Specimen: Respirate from Nasopharyngeal Updated: 11/13/20 2301     COVID Order Status Received     Comment: The order for SARS CoV-2 testing has been received by the  Laboratory. This result is neither positive nor negative.  Final results of testing will report in 24-48 hours, separately.         Narrative:      Droplet, Contact, and Eye Protection  Rule-out COVID-19 panel, includes droplet/contact/eye  isolation order.  Check influenza to order this test only if  specifically indicated.  Is patient being admitted?->Yes  Does this patient meet criteria for Rush/Cepheid per [facility]  Inpatient Workflow? (See workflow link below)->Yes  Expected turn around time?->Rush (Cepheid 2-4 hours)  Is this the patients First SARS CoV-2 test?->No  Is this patient employed in healthcare?->No  Is the patient symptomatic as defined by the CDC?->Yes  Date of symptom onset?->11/12/20  Is the patient hospitalized?->No  Is the patient a resident in a congregate care setting?->No  Is the patient pregnant?->No    URINALYSIS [[id number]]     Order  Status: Canceled Specimen: Urine                ASSESSMENT/PLAN:   POD #4 S/P right great toe amputation through MTPJ with bone biopsy of metatarsal head.  Ischemic changes to incision and plantar first MTH. will continue to monitor amputation site at this time and plan for possible revision in outpatient setting.  Recommend continuation with antibiotics, offloading, wound care.    Wound care:   - wound care orders updated for nursing  -Right great toe amp: Adaptic, dry gauze, roll gauze, Ace wrap.  Change daily.  Okay to be changed 3 times a week with home health.    Vascular status:   -Biphasic tones with Doppler to PT/DP bilaterally  Followed by Dr. Encinas.  Recommend follow-up with Dr. Encinas next week    Antibiotics:   -ID: Not involved  Pathology negative  11/19: On Keflex renal dosed for HD.  Discussed with hospitalist.  Recommend extension of antibiotics for 2 weeks for soft tissue infection.    11/20: Antibiotics changed to Augmentin 500 mg daily for 10 days.  Updated Dr. Flaherty      Plan to return to O.R.:   -no further surgeries planned at this time   will plan for possible revision in outpatient setting.    Weight Bearing Status:   -Heel weight bearing    Offloading:   Diabetic offloading boot ordered.  To be worn when ambulating.  -Orthotic company: None, will need prescription once amputation site has healed.      PT/OT :   -involved, last seen 11/18/2020    Diabetes Education:   Not involved this admission, declined    - Implications of loss of protective sensation (LOPS) discussed with patient- including increased risk for amputation.  Advised to check feet at least daily, moisturize feet, and to always wear protective foot wear.   -avoid trimming own nails. See podiatrist or certified foot and nail RN  -keep blood sugars <150 for improved wound healing          DISCHARGE PLAN:    Disposition: Home.  Okay to discharge from LPS/Ortho standpoint once medically cleared.      Recommend home health  and outpatient wound care clinic  Follow-up wound care clinic first appointment 12/3/2020.  LPS rounds 12/4/2020.  Orders written for home health for dressing change    Follow-up: Dr. Lynn next week to reevaluate for possible revision.  Sutures to be removed approximately 3 weeks post-op  Follow-up with Dr. Encinas next week    Discussed with: pt, RN, Dr. Lynn, [facility] RN case manager      Please note that this dictation was created using voice recognition software. I have  worked with technical experts from [facility] [employer] to optimize the interface.  I have made every reasonable attempt to correct obvious errors, but there may be errors of grammar and possibly content that I did not discover before finalizing the note.      Carla Farah, A.P.R.N.    If any questions or concerns, please contact [facility] through voalte or tiger text.

## 2020-12-02 NOTE — PROGRESS NOTES
"Chief Complaint   Patient presents with   • Coronary Artery Disease   • Hypertension   • Dyslipidemia   • Atrial Fibrillation       Subjective:   Adalid Sepulveda is a 74 y.o. male who presents today for follow-up evaluation of PAF, anticoagulation CAD, LAD stent, hypertension, dyslipidemia with history of diabetes mellitus, CKD on dialysis and associated intermittent hypotension.    Last seen 6/2/2020.    Since 6/2/2020 recently hospitalized 11/13-11/20/2020 for infected right big toe, amputated, wound care.  Severe anemia, hemoglobin 6.5-7.  Echo shows improved EF 55%.  No melena.  Recent left arm AV fistula, still has right permacath for dialysis.  Fatigue.  Obtained a personal home  to try to keep him physical conditioning.  Feels out of breath.  40-pack-year smoking history, quit 12 years ago.  Fluctuating BP with dialysis.    Last seen in the ER 11/29 with dialysis induced hypotension and angina pectoris superimposed on significant anemia.  The patient states that his dialysis induced hypotension is \"smoothing out\".  His left AV fistula is matured and will be using that now for hemodialysis with subsequent removal of permacath.  No angina pectoris.  Compliant with medications.  No bleeding.  Overall is grateful for his current situation and medical care.  May need to have thyroid nodule removed.    Past Medical History:   Diagnosis Date   • CAD (coronary artery disease)     stents   • Chronic anticoagulation 3/26/2020   • Chronic kidney disease (CKD), stage V (HCC)    • Congestive heart failure (HCC)    • Diabetes    • Hypertension    • Kidney failure    • Osteoarthritis    • Peripheral neuropathy      Past Surgical History:   Procedure Laterality Date   • TOE AMPUTATION Right 11/16/2020    Procedure: AMPUTATION, TOE GREAT;  Surgeon: Leobardo Lynn M.D.;  Location: SURGERY [facility];  Service: Orthopedics   • TOE AMPUTATION Left 5/17/2020    Procedure: AMPUTATION, TOE GREAT;  Surgeon: Leobardo" TK Lynn;  Location: SURGERY [facility];  Service: Orthopedics   • TENOTOMY Left 2020    Procedure: FLEXOR TENOTOMIES, TWO-FIVE TOES;  Surgeon: Leobardo Lynn M.D.;  Location: SURGERY [facility];  Service: Orthopedics   • APPENDECTOMY     • OTHER CARDIAC SURGERY      stents x1   • OTHER ORTHOPEDIC SURGERY      knee surgery     Family History   Problem Relation Age of Onset   • Heart Disease Mother    • Alcohol/Drug Father    • Thyroid Son    • Diabetes Brother    • Hypertension Neg Hx    • Hyperlipidemia Neg Hx      Social History     Socioeconomic History   • Marital status:      Spouse name: Not on file   • Number of children: Not on file   • Years of education: Not on file   • Highest education level: Not on file   Occupational History   • Not on file   Social Needs   • Financial resource strain: Not hard at all   • Food insecurity     Worry: Never true     Inability: Never true   • Transportation needs     Medical: No     Non-medical: No   Tobacco Use   • Smoking status: Former Smoker     Packs/day: 1.00     Years: 55.00     Pack years: 55.00     Types: Cigarettes     Quit date: 2014     Years since quittin.8   • Smokeless tobacco: Never Used   Substance and Sexual Activity   • Alcohol use: No   • Drug use: No   • Sexual activity: Not on file   Lifestyle   • Physical activity     Days per week: Not on file     Minutes per session: Not on file   • Stress: Not on file   Relationships   • Social connections     Talks on phone: Not on file     Gets together: Not on file     Attends [employer] service: Not on file     Active member of club or organization: Not on file     Attends meetings of clubs or organizations: Not on file     Relationship status: Not on file   • Intimate partner violence     Fear of current or ex partner: Not on file     Emotionally abused: Not on file     Physically abused: Not on file     Forced sexual activity: Not on file   Other Topics Concern   • Not  on file   Social History Narrative   • Not on file     Allergies   Allergen Reactions   • Mircera [Methoxy Polyethylene Glycol-Epoetin Beta] Hives     Outpatient Encounter Medications as of 12/2/2020   Medication Sig Dispense Refill   • calcium acetate (PHOS-LO) 667 MG Tab tablet Take 1 Tab by mouth 3 times a day before meals. 180 Tab 0   • carvedilol (COREG) 3.125 MG Tab Take 1 Tab by mouth 2 times a day, with meals. 60 Tab 0   • clopidogrel (PLAVIX) 75 MG Tab TAKE 1 TABLET BY MOUTH  DAILY 90 Tab 3   • ondansetron (ZOFRAN) 4 MG Tab tablet Take 1 Tab by mouth.     • methimazole (TAPAZOLE) 5 MG Tab TAKE 1.5 TABLETS BY MOUTH  IN THE MORNING AND 1 TABLET IN THE  EVENING 225 Tab 0   • apixaban (ELIQUIS) 2.5mg Tab Take 1 Tab by mouth 2 Times a Day. 180 Tab 3   • calcitRIOL (ROCALTROL) 0.25 MCG Cap Take 0.25 mcg by mouth every day.     • insulin glargine (LANTUS SOLOSTAR) 100 UNIT/ML Solution Pen-injector injection Inject 5 Units as instructed every bedtime.     • pantoprazole (PROTONIX) 40 MG Tablet Delayed Response Take 40 mg by mouth every bedtime.     • atorvastatin (LIPITOR) 40 MG Tab Take 1 Tab by mouth every bedtime. 30 Tab 0   • oxyCODONE immediate-release (ROXICODONE) 5 MG Tab TAKE 1 TABLET BY MOUTH EVERY 4 HOURS FOR FIVE DAYS, G89.18     • ipratropium (ATROVENT HFA) 17 MCG/ACT Aero Soln Inhale 2 Puffs every 6 hours. (Patient not taking: Reported on 12/2/2020) 17 g 1   • calcitRIOL (ROCALTROL) 0.25 MCG Cap Take 1 Cap by mouth every day. (Patient not taking: Reported on 12/2/2020) 30 Cap 0   • clopidogrel (PLAVIX) 75 MG Tab Take 1 Tab by mouth every day. (Patient not taking: Reported on 12/2/2020) 30 Tab 0   • methimazole (TAPAZOLE) 5 MG Tab Take 1.5 Tabs by mouth every morning. (Patient not taking: Reported on 12/2/2020) 90 Tab 0   • methimazole (TAPAZOLE) 5 MG Tab Take 1 Tab by mouth every evening. (Patient not taking: Reported on 12/2/2020) 90 Tab 0   • aspirin (ASA) 81 MG Chew Tab chewable tablet Chew 1 Tab  "every day. (Patient not taking: Reported on 12/2/2020) 100 Tab 0   • calcium acetate (PHOS-LO) 667 MG Tab tablet      • ondansetron (ZOFRAN ODT) 4 MG TABLET DISPERSIBLE      • carvedilol (COREG) 3.125 MG Tab Take 3.125 mg by mouth 2 times a day, with meals.     • calcium acetate (PHOS-LO) 667 MG Cap Take 1,334 mg by mouth 3 times a day, with meals.       No facility-administered encounter medications on file as of 12/2/2020.      Review of Systems   Respiratory: Negative for cough and shortness of breath.    Cardiovascular: Negative for chest pain and palpitations.   Musculoskeletal: Negative for myalgias.   Neurological: Negative for dizziness and loss of consciousness.   Endo/Heme/Allergies: Does not bruise/bleed easily.        Objective:   /58 (BP Location: Right arm, Patient Position: Sitting, BP Cuff Size: Adult)   Ht 1.651 m (5' 5\")   Wt 79 kg (174 lb 3.2 oz)   BMI 28.99 kg/m²     Physical Exam   Constitutional: He is oriented to person, place, and time. He appears well-developed and well-nourished. No distress.   HENT:   Head: Normocephalic and atraumatic.   Eyes: Pupils are equal, round, and reactive to light. Conjunctivae and EOM are normal. No scleral icterus.   Neck: Normal range of motion. Neck supple. No JVD present. No thyromegaly present.   Permacath.   Cardiovascular: Normal rate, regular rhythm, normal heart sounds and intact distal pulses. Exam reveals no gallop and no friction rub.   No murmur heard.  Pulmonary/Chest: Effort normal and breath sounds normal. No accessory muscle usage. No respiratory distress. He has no wheezes. He has no rales.   Right-sided permacath.   Musculoskeletal:         General: No edema.      Comments: Left upper arm AV fistula.  Support boot left foot.   Neurological: He is alert and oriented to person, place, and time. Coordination normal.   Skin: Skin is warm, dry and intact. No rash noted. No cyanosis. Nails show no clubbing.   Psychiatric: He has a normal " "mood and affect. His behavior is normal.   Vitals reviewed.    CARDIAC CATHETERIZATION 11/16/2019  PREPROCEDURE DIAGNOSES:  1.  Non-ST elevation myocardial infarction.  2.  Previous coronary stent 7 years ago, [facility], [place].  3.  End-stage renal disease, on hemodialysis.  4.  Diabetes mellitus.  5.  Paroxysmal atrial fibrillation, new diagnosis.     POSTPROCEDURE DIAGNOSES:  1.  Patent coronary stents proximal mid, left anterior descending   artery.  2.  Coronary artery disease involving diagonal \"jailed\" branch   ostial 90% stenosis, posterior descending artery ostial 50% stenosis.    ECHOCARDIOGRAM 04/22/2020  Prior echo done on 11/22/2019.  Mildly reduced left ventricular systolic function. Left ventricular   ejection fraction is visually estimated to be 45%.  No evidence of valvular abnormality based on Doppler evaluation.   Compared to the images of the prior study done -  there has been no   significant change.      Assessment:     1. Coronary artery disease, non-occlusive     2. Presence of stent in LAD coronary artery     3. Cardiomyopathy, ischemic     4. Paroxysmal A-fib (HCC)     5. Anticoagulated     6. Right bundle branch block     7. Left anterior fascicular block         Medical Decision Making:  Today's Assessment / Status / Plan:     Assessment  0.  SOB.  1.    Dialysis induced hypotension further aggravated by severe anemia are resolved..  2.  CAD.  3.  Coronary stents 2012 proximal left anterior descending artery and resultant jailed/trapped diagonal branch.  4.  Ischemic cardiomyopathy with mild left ventricular dysfunction EF 45-50%.  5.  PAF.  Diagnosed 11/2019  6.  Anticoagulation on Eliquis.  7.  Conduction system disease left anterior fascicular block right bundle branch block   8.  Anemia severe progressively worse, Hgb 7.8.  9.  Pneumonia  10.  End-stage renal disease hemodialysis.  11.  Diabetes mellitus.  12.  Hypertension.  13.  Dyslipidemia.  14.  Chronic ischemic " ulcer left big toe.  15.  Thyroid nodule.  16.  COPD.    Recommendation Discussion  1.  Shortness of breath multifactorial; severe anemia, probable COPD, multiple medical problems, recent infection and hospitalization, deconditioning  2.  Otherwise the patient is stable from a cardiac standpoint concerning his coronary artery disease with no angina pectoris, ischemic cardiomyopathy normalization of EF and no evidence of fluid overload, PAF maintaining NSR and anticoagulation with antiplatelet therapy with no bleeding, dialysis induced hypotension which is improving and dyslipidemia.  3.  Reviewed his recent blood work with the patient.  4.  Renewed his Eliquis.  5.  We will have him discontinue Plavix due to severe anemia and continue Eliquis.  6.  Continue current cardiac therapy.  7.  Follow-up 6 months.

## 2020-12-02 NOTE — DOCUMENTATION QUERY
[facility]                                                                       Query Response Note      PATIENT:               KLARISSA BRADSHAW  ACCT #:                  [id number]  MRN:                     [id number]  :                      1945  ADMIT DATE:       2020 9:40 PM  DISCH DATE:        2020 2:30 PM  RESPONDING  PROVIDER #:        [id number]           QUERY TEXT:    Pt has documented sepsis noted as ?rule out? or similar terminology such as suspected, probable, etc.  Please clarify status of this condition:    NOTE:  If an appropriate response is not listed below, please respond with a new note.       The patient's Clinical Indicators include:  Patient with history of severe pvd admitted with gangrenous right great toe. Patient had an elevated WBC of 15.2 on arrival and sepsis due to gangrene was diagnosed. Sepsis is documented throughout progress notes, however not mentioned on discharge summary.  Options provided:   -- Sepsis is ruled in   -- Sepsis is  ruled out   -- Unable to determine      Query created by: Maxine Szymanski on 2020 8:22 AM    RESPONSE TEXT:    Sepsis is ruled in          Electronically signed by:  CANDY GREENE MD 2020 9:49 AM

## 2020-12-03 NOTE — PROGRESS NOTES
Patient canceled appointment today in clinic 12/3/2020.  I did not see the patient in clinic today.

## 2020-12-08 NOTE — DISCHARGE INSTRUCTIONS
Try Mucinex for the congestion.  Present to dialysis tomorrow as scheduled.  Return for worsening shortness of breath chest pain or ill appearance.

## 2020-12-08 NOTE — ED PROVIDER NOTES
"ED Provider Note    CHIEF COMPLAINT  Chief Complaint   Patient presents with   • Coronavirus Screening     Pt c/o shortness of breath increased w/ exertion and productive cough, started \"a few days ago\"       HPI  Luis Sepulveda is a 74 y.o. male who presents for shortness of breath with exertion and even at rest for the last 3 days.  Patient has chronic dyspnea.  History of heart failure.  No asthma.  No chest pain.  Former smoker.  No diagnosis of COPD and does not use MDIs.  No recent GI bleed or melena.  Denies fever.  He does have some cough.  No headache, body aches, loss of taste or smell, vomiting or diarrhea.  Recent amputation of his right great and second toe due to peripheral vascular disease.  This was earlier in November.  Chronic mild edema right leg after surgery.  No other dependent edema.    REVIEW OF SYSTEMS  Pertinent positives include: Dyspnea dyspnea on exertion and cough.  Pertinent negatives include: Fever, DVT or PE history.  10+ systems reviewed and negative.      PAST MEDICAL HISTORY  Past Medical History:   Diagnosis Date   • CAD (coronary artery disease)     stents   • Chronic anticoagulation 3/26/2020   • Chronic kidney disease (CKD), stage V (HCC)    • Congestive heart failure (HCC)    • Diabetes    • Hemodialysis patient (HCC)     Tuesday, Thursday, Saturday   • Hypertension    • Kidney failure    • Osteoarthritis    • Peripheral neuropathy        FAMILY HISTORY  Family History   Problem Relation Age of Onset   • Heart Disease Mother    • Alcohol/Drug Father    • Thyroid Son    • Diabetes Brother    • Hypertension Neg Hx    • Hyperlipidemia Neg Hx        SOCIAL HISTORY  Social History     Tobacco Use   • Smoking status: Former Smoker     Packs/day: 1.00     Years: 55.00     Pack years: 55.00     Types: Cigarettes     Quit date: 2014     Years since quittin.8   • Smokeless tobacco: Never Used   Substance Use Topics   • Alcohol use: No   • Drug use: No     Social " History     Substance and Sexual Activity   Drug Use No       SURGICAL HISTORY  Past Surgical History:   Procedure Laterality Date   • TOE AMPUTATION Right 11/16/2020    Procedure: AMPUTATION, TOE GREAT;  Surgeon: Leobardo Lynn M.D.;  Location: SURGERY [facility];  Service: Orthopedics   • TOE AMPUTATION Left 5/17/2020    Procedure: AMPUTATION, TOE GREAT;  Surgeon: Leobardo Lynn M.D.;  Location: SURGERY [facility];  Service: Orthopedics   • TENOTOMY Left 5/17/2020    Procedure: FLEXOR TENOTOMIES, TWO-FIVE TOES;  Surgeon: Leobardo Lynn M.D.;  Location: SURGERY [facility];  Service: Orthopedics   • APPENDECTOMY     • OTHER CARDIAC SURGERY      stents x1   • OTHER ORTHOPEDIC SURGERY      knee surgery       CURRENT MEDICATIONS  No current facility-administered medications for this encounter.      Current Outpatient Medications   Medication Sig Dispense Refill   • oxyCODONE immediate-release (ROXICODONE) 5 MG Tab TAKE 1 TABLET BY MOUTH EVERY 4 HOURS FOR FIVE DAYS, G89.18     • ipratropium (ATROVENT HFA) 17 MCG/ACT Aero Soln Inhale 2 Puffs every 6 hours. (Patient not taking: Reported on 12/2/2020) 17 g 1   • calcium acetate (PHOS-LO) 667 MG Tab tablet Take 1 Tab by mouth 3 times a day before meals. 180 Tab 0   • carvedilol (COREG) 3.125 MG Tab Take 1 Tab by mouth 2 times a day, with meals. 60 Tab 0   • calcitRIOL (ROCALTROL) 0.25 MCG Cap Take 1 Cap by mouth every day. (Patient not taking: Reported on 12/2/2020) 30 Cap 0   • clopidogrel (PLAVIX) 75 MG Tab Take 1 Tab by mouth every day. (Patient not taking: Reported on 12/2/2020) 30 Tab 0   • methimazole (TAPAZOLE) 5 MG Tab Take 1.5 Tabs by mouth every morning. (Patient not taking: Reported on 12/2/2020) 90 Tab 0   • methimazole (TAPAZOLE) 5 MG Tab Take 1 Tab by mouth every evening. (Patient not taking: Reported on 12/2/2020) 90 Tab 0   • aspirin (ASA) 81 MG Chew Tab chewable tablet Chew 1 Tab every day. (Patient not taking: Reported on 12/2/2020) 100  "Tab 0   • clopidogrel (PLAVIX) 75 MG Tab TAKE 1 TABLET BY MOUTH  DAILY 90 Tab 3   • ondansetron (ZOFRAN) 4 MG Tab tablet Take 1 Tab by mouth.     • calcium acetate (PHOS-LO) 667 MG Tab tablet      • methimazole (TAPAZOLE) 5 MG Tab TAKE 1.5 TABLETS BY MOUTH  IN THE MORNING AND 1 TABLET IN THE  EVENING 225 Tab 0   • ondansetron (ZOFRAN ODT) 4 MG TABLET DISPERSIBLE      • apixaban (ELIQUIS) 2.5mg Tab Take 1 Tab by mouth 2 Times a Day. 180 Tab 3   • calcitRIOL (ROCALTROL) 0.25 MCG Cap Take 0.25 mcg by mouth every day.     • carvedilol (COREG) 3.125 MG Tab Take 3.125 mg by mouth 2 times a day, with meals.     • insulin glargine (LANTUS SOLOSTAR) 100 UNIT/ML Solution Pen-injector injection Inject 5 Units as instructed every bedtime.     • pantoprazole (PROTONIX) 40 MG Tablet Delayed Response Take 40 mg by mouth every bedtime.     • calcium acetate (PHOS-LO) 667 MG Cap Take 1,334 mg by mouth 3 times a day, with meals.     • atorvastatin (LIPITOR) 40 MG Tab Take 1 Tab by mouth every bedtime. 30 Tab 0       ALLERGIES  Allergies   Allergen Reactions   • Mircera [Methoxy Polyethylene Glycol-Epoetin Beta] Hives       PHYSICAL EXAM  VITAL SIGNS: /80   Pulse 89   Temp 36.8 °C (98.2 °F) (Temporal)   Resp 19   Ht 1.651 m (5' 5\")   Wt 79.3 kg (174 lb 13.2 oz)   SpO2 100%   BMI 29.09 kg/m²   Reviewed and hypertensive, afebrile, no hypoxia on room air  Constitutional: Well developed, Well nourished, well-appearing, mildly overweight.  HENT: Normocephalic, atraumatic, bilateral external ears normal, Wearing mask.   Eyes: PERRLA, conjunctiva pink, no scleral icterus.   Cardiovascular: Regular S1-S2 without murmur, rub, gallop.  Positive JVD sitting upright to near the angle of the mandible no dependent edema or calf tenderness.  Respiratory: No rales, rhonchi, wheeze or cough.  Gastrointestinal: Soft, nontender, nondistended, no organomegaly.  Skin: No erythema, no rash.   Genitourinary:  No costovertebral angle tenderness. "   Neurologic: Alert & oriented x 3, cranial nerves 2-12 intact by passive exam.  No focal deficit noted.  Psychiatric: Affect normal, Judgment normal, Mood normal.   Musculoskeletal: Amputation of right great and second toes with scab and blackish eschar over the amputation site    DIFFERENTIAL DIAGNOSIS:  CHF, pneumonia, bronchospasm, Covid, pulmonary embolism, myocardial infarction.    EKG  EKG Interpretation 12:22 AM    Interpreted by me.  Indication: Dyspnea    Rhythm: normal sinus   Rate: Normal in the 90s  Axis: -64  Ectopy: PVC  Conduction: Right bundle pattern  ST/T Waves: no acute change  Q Waves: none  R Wave progression: normal    Clinical Impression: Sinus rhythm with right bundle branch block, left anterior fascicular block and PVCs  .    RADIOLOGY/PROCEDURES  CT-CTA CHEST PULMONARY ARTERY W/ RECONS   Final Result      1.  No evidence of pulmonary embolus.      2.  Emphysematous change of the lungs.      3.  Again seen scarring within the left lung base with extensive pleural calcification.      4.  Gallstones.      5.  Splenomegaly.      6.  Renal cortical thinning bilaterally.      DX-CHEST-PORTABLE (1 VIEW)   Final Result      1.  Brain megaly with interstitial prominence.      2.  Left costophrenic angle pleural scarring versus minimal pleural effusion.          LABORATORY:  Results for orders placed or performed during the hospital encounter of 12/07/20   proBrain Natriuretic Peptide, NT   Result Value Ref Range    NT-proBNP 9224 (H) 0 - 125 pg/mL   CBC w/ Differential   Result Value Ref Range    WBC 7.8 4.8 - 10.8 K/uL    RBC 2.53 (L) 4.70 - 6.10 M/uL    Hemoglobin 8.4 (L) 14.0 - 18.0 g/dL    Hematocrit 26.5 (L) 42.0 - 52.0 %    .7 (H) 81.4 - 97.8 fL    MCH 33.2 (H) 27.0 - 33.0 pg    MCHC 31.7 (L) 33.7 - 35.3 g/dL    RDW 64.8 (H) 35.9 - 50.0 fL    Platelet Count 225 164 - 446 K/uL    MPV 8.8 (L) 9.0 - 12.9 fL    Neutrophils-Polys 75.40 (H) 44.00 - 72.00 %    Lymphocytes 14.70 (L) 22.00 -  41.00 %    Monocytes 9.00 0.00 - 13.40 %    Eosinophils 0.10 0.00 - 6.90 %    Basophils 0.30 0.00 - 1.80 %    Immature Granulocytes 0.50 0.00 - 0.90 %    Nucleated RBC 0.00 /100 WBC    Neutrophils (Absolute) 5.85 1.82 - 7.42 K/uL    Lymphs (Absolute) 1.14 1.00 - 4.80 K/uL    Monos (Absolute) 0.70 0.00 - 0.85 K/uL    Eos (Absolute) 0.01 0.00 - 0.51 K/uL    Baso (Absolute) 0.02 0.00 - 0.12 K/uL    Immature Granulocytes (abs) 0.04 0.00 - 0.11 K/uL    NRBC (Absolute) 0.00 K/uL   Troponin STAT   Result Value Ref Range    Troponin T 126 (H) 6 - 19 ng/L   Complete Metabolic Panel (CMP)   Result Value Ref Range    Sodium 139 135 - 145 mmol/L    Potassium 5.0 3.6 - 5.5 mmol/L    Chloride 99 96 - 112 mmol/L    Co2 23 20 - 33 mmol/L    Anion Gap 17.0 (H) 7.0 - 16.0    Glucose 160 (H) 65 - 99 mg/dL    Bun 40 (H) 8 - 22 mg/dL    Creatinine 6.99 (HH) 0.50 - 1.40 mg/dL    Calcium 9.0 8.4 - 10.2 mg/dL    AST(SGOT) 16 12 - 45 U/L    ALT(SGPT) 9 2 - 50 U/L    Alkaline Phosphatase 115 (H) 30 - 99 U/L    Total Bilirubin 0.4 0.1 - 1.5 mg/dL    Albumin 3.8 3.2 - 4.9 g/dL    Total Protein 6.9 6.0 - 8.2 g/dL    Globulin 3.1 1.9 - 3.5 g/dL    A-G Ratio 1.2 g/dL   COVID/SARS CoV-2 PCR    Specimen: Nasopharyngeal; Respirate   Result Value Ref Range    COVID Order Status Received    D-DIMER   Result Value Ref Range    D-Dimer Screen 0.73 (H) 0.00 - 0.50 ug/mL (FEU)   CoV-2, Flu A/B, And RSV by PCR   Result Value Ref Range    Influenza virus A RNA Negative Negative    Influenza virus B, PCR Negative Negative    RSV, PCR Negative Negative    SARS-CoV-2 by PCR NotDetected     SARS-CoV-2 Source NP Swab        INTERVENTIONS:  Medications   acetaminophen (TYLENOL) tablet 1,000 mg (1,000 mg Oral Given 12/7/20 2133)   iohexol (OMNIPAQUE) 350 mg/mL (171 mL Intravenous Given 12/7/20 3311)       COURSE & MEDICAL DECISION MAKING  Per chart review this patient had an echocardiogram this fall with improvement in ejection fraction to 55% from  baseline.    Well-appearing patient presents with dyspnea on exertion and a feeling of congestion in his throat.  There is no evidence of pulmonary edema, pneumonia, pneumothorax, Covid, influenza or pulmonary embolism.  Patient has an elevated brain natruretic peptide but does not appear to have severe CHF.  He may have mild fluid overload.  He has dialysis in the morning at 6 AM and feels well enough to go home and wishes to be discharged    PLAN:  Continue current medications  Dialysis in the a.m.  Return for worsening shortness of breath, chest pain or ill appearance    Patito Edgar M.D.  [address] [place] [address] [place] [id number]  [phone]    Schedule an appointment as soon as possible for a visit   As needed      CONDITION: Stable.    FINAL IMPRESSION  1. Shortness of breath    2. Elevated brain natriuretic peptide (BNP) level          Electronically signed by: Mario Moffett M.D., 12/7/2020 7:35 PM

## 2020-12-08 NOTE — ED TRIAGE NOTES
"Chief Complaint   Patient presents with   • Coronavirus Screening     Pt c/o shortness of breath increased w/ exertion and productive cough, started \"a few days ago\"     /80   Pulse 89   Temp 36.8 °C (98.2 °F) (Temporal)   Resp 19   Ht 1.651 m (5' 5\")   Wt 79.3 kg (174 lb 13.2 oz)   SpO2 100%   BMI 29.09 kg/m²     Pt reports scheduled for HD tomorrow.      "

## 2020-12-08 NOTE — ED NOTES
Pt provided with discharge paper work and prescriptions. Pt declines any questions at this time. Pt to ambulate out of ER once dressed.

## 2020-12-08 NOTE — ED NOTES
Abi from Lab called with critical result of troponin at 126. Critical lab result read back to Abi.   Dr. Moffett notified of critical lab result at 2033.  Critical lab result read back by Dr. Moffett.

## 2020-12-09 NOTE — PROGRESS NOTES
Provider Encounter- Diabetic Foot Ulcer      HISTORY OF PRESENT ILLNESS  Wound History:    START OF CARE IN CLINIC: 12/8/2020    REFERRING PROVIDER: Misty Chiu M.D.     WOUND- Diabetic foot ulcer, complicated by PVD   LOCATION: Right hallux   HISTORY: Patient was first seen at [facility] for necrotic wound to his right distal plantar hallux in early November 2020.  Prior to this, he had undergone a right lower extremity angiogram with atherectomy and angioplasty of the AT/PT and peroneal critical limb ischemia with Dr. Encinas.  Patient then presented to the [facility] emergency room on 11/13 with increased pain, fever, and chills.  He was taken to surgery on 11/16 with Dr. Lynn for a left foot I&D, left first ray amp, and bone biopsy.  He was discharged home on p.o. antibiotics and with referral to the outpatient wound clinic.      Pertinent Medical History: Uncontrolled diabetes mellitus type 2, end-stage renal disease on dialysis, PVD, left hallux amputation    DIABETES HX: Diagnosed with type 2 diabetes in 2009 or 2010, and is currently managing with just insulin.  Checks blood sugars 1-2 times per day and reports that these typically run around 150.  Has  had previous diabetes education.  Does  have numbness in feet.  Usually wears regular, nonprescriptive shoes. Does  check feet routinely.  He has had previous foot ulcers, and has undergone a previous amputation of his left hallux.   He was prescribed orthotic shoes and inserts, but never obtained them.       TOBACCO USE: Never smoker, quit in 2014.  Never used smokeless tobacco    Patient's problem list, allergies, and current medications reviewed and updated in Epic    Interval History:  12/8/2020: Initial clinic visit with BABAR Cruz.  Patient states he is not feeling very well, was in the emergency room last night with shortness of breath.  Covid and flu negative.  Chest CT negative for PE.  He was discharged home.  Today he denies fevers,  "chills, nausea, or vomiting, though still complains of feeling short of breath.  Room air sat 91%.  Surgical surgical site is necrotic, DP pulse not palpable, monophasic by Doppler.  PT pulse triphasic by Doppler.  Patient presents today wearing a flat, postsurgical type shoe.  States he was provided with a Jazmyn boot, however cannot wear it because it, \"throws me off\".  He is supposed to be seen in the clinic on 12/3 but canceled his appointment.   I contacted Dr. Encinas and Dr. Lynn by text, informed him of clinical findings.  Patient will undoubtedly require additional surgery.  Dr. Encinas unable to get patient in until next week, Dr. Lynn thinks he can get him into his clinic in the next few days.      REVIEW OF SYSTEMS:   Review of Systems   Constitutional: Negative for chills and fever.   Respiratory: Positive for shortness of breath. Negative for cough.         Has felt short of breath for several days.  Was in the ED last night.  Covid and flu negative.  CT negative for PE   Cardiovascular: Negative for chest pain, claudication and leg swelling.   Gastrointestinal: Negative for constipation, diarrhea, nausea and vomiting.   Genitourinary:        On dialysis 3 days/week   Musculoskeletal: Negative for joint pain.   Neurological:        Both feet numb   Psychiatric/Behavioral: Negative for depression. The patient is not nervous/anxious.        PHYSICAL EXAMINATION:   BP (!) 99/40   Pulse 92   Temp 37.1 °C (98.8 °F)   Resp 20   SpO2 91%     Physical Exam   Constitutional: He is oriented to person, place, and time and well-developed, well-nourished, and in no distress.   HENT:   Head: Normocephalic.   Eyes: Pupils are equal, round, and reactive to light.   Cardiovascular:   Unable to palpate right DP pulse, PT pulse barely palpable  DP pulse monophasic and distant by Doppler, PT pulses triphasic   Pulmonary/Chest: Effort normal.   Musculoskeletal:      Comments: Healed amputation of left hallux "   Neurological: He is alert and oriented to person, place, and time.   2020: Monofilament testing in clinic.  Both feet insensate   Skin: Skin is warm.   Right first ray amputation site necrotic, second toe dusky  Refer to wound flowsheet and photos   Psychiatric: Mood, memory, affect and judgment normal.       WOUND ASSESSMENT         Wound 20 Other (comment) MTH 1 Plantar Right --Right Plantar 1st MTH (Active)   Wound Image   20   Site Assessment Boggy;Black 20   Periwound Assessment Dry;Other (Comment) 20   Margins Attached edges 20   Drainage Amount Small 20   Drainage Description Serosanguineous 20   Treatments Cleansed 20   Wound Cleansing [facility] 20   Periwound Protectant Not Applicable 20   Dressing Options Mepitel One;Calcium Alginate;Dry Roll Gauze;Hypafix Tape 20   Wound Bed Granulation (%) 0 % 20   Wound Bed Epithelium (%) 0 % 20   Wound Bed Slough (%) 0 % 20   Wound Bed Eschar (%) 100 % 20   Wound Odor None 20   Pulses Not palpable;Doppler 20   Right Foot Monofilament 10-point exam (Sensate) 0/10 20   Left Foot Monofilament 10-point exam (Sensate) 0/10 20 1600   Exposed Structures EMELIA 20 1600        PROCEDURE:   - no debridement today due to lower extremity arterial insufficiency  -Wound care completed by wound RN, refer to flowsheet  -Patient tolerated the procedure well, without c/o pain or discomfort.       Pertinent Labs and Diagnostics:    Labs:     A1c:   Lab Results   Component Value Date/Time    HBA1C 7.8 (H) 2020 02:39 AM          IMAGIN2020-three-view x-ray right foot  FINDINGS:  The bony structures are intact, with no definite destructive process, fracture, or dislocation. There is extensive subcutaneous gas within the first toe and along the plantar aspect of the  distal foot.     IMPRESSION:     Soft tissue gas as described above. No definite evidence of osteomyelitis however. No fracture.      VASCULAR STUDIES:   4/22/2020-arterial studies  FINDINGS   Right.    Calcific plaque is seen throughout the right lower extremity. Plaque is    especially dense in the tibial and peroneal arteries.   Unable to visualize the right peroneal artery due to calcific shadowing    from the posterior and anterior tibial arteries.    No focal stenosis is seen.    Waveforms are monophasic at the distal anterior and posterior tibial    arteries.     LAST  WOUND CULTURE:  DATE :   None found in epic      ASSESSMENT AND PLAN:     1. Arterial insufficiency with ischemic ulcer (HCC)  Comments: Status post angiogram with atherectomy and angioplasty in October 2020, followed by first ray amputation.    12/8/2020: Initial clinic visit.  Patient presents today with ischemia to amputation site.  He has been wearing flat postop type shoe rather than Jazmyn boot as prescribed.  DP pulse not palpable, monophasic and distant by Doppler.  -No debridement in clinic today due to arterial insufficiency  -Dr. Encinas and Dr. Lynn contacted, informed of assessment findings  -Dr. Encinas not able to see patient till next week.  Dr. Lynn's office to try to get patient in ASAP.  Patient will likely need to undergo further surgery.  Patient informed  -Patient to return to clinic next week if he does not have surgery.    Wound care: Mepitel contact layer, Silver Hydrofiber to manage exudate and bioburden, foam cover dressing, roll gauze to secure    2. Uncontrolled diabetes mellitus type 2 with peripheral artery disease (HCC)  Comments: Most recent A1c 7.8    12/8/2020: Patient reports blood sugar today was around 150.    3. Diabetic polyneuropathy associated with type 2 diabetes mellitus (HCC)    12/8/2020: Feet insensate  - Implications of loss of protective sensation (LOPS) discussed with patient- including  increased risk for amputation.  Advised to check feet at least daily, moisturize feet, and to always wear protective foot wear.             PATIENT EDUCATION  - Importance of tight glucose control for wound healing   - Implications of loss of protective sensation (LOPS) discussed with patient- including increased risk for amputation.  - Advised to check feet at least daily, moisturize feet, and to always wear protective foot wear.   -  Importance of offloading foot to assist with wound healing  - Advised pt not to trim nails or calluses, seek foot/nail care from podiatrist or certified foot/nail nurse  - Importance of adequate nutrition for wound healing    25 min spent face to face with patient, >50% of time spent counseling, coordinating care, reviewing records, discussing POC, educating patient regarding wound healing and progressions, diabetes education.  This time was spent in excess to procedure time.       Please note that this note may have been created using voice recognition software. I have worked with technical experts from [facility] to optimize the interface.  I have made every reasonable attempt to correct obvious errors, but there may be errors of grammar and possibly content that I did not discover before finalizing the note.    N

## 2020-12-11 NOTE — PROGRESS NOTES
Pt seen during ortho rounds with LPS team for right foot wounds. Following physician assessment, RN evaluated patient's wound and painted with Betadine and allowed to dry. Wrapped in Kerlix and secured with tape. ACE wrap to right foot per patient request.     ARTUR to contact patient to schedule for surgery.

## 2020-12-12 NOTE — PROGRESS NOTES
LIMB PRESERVATION SERVICE ROUNDS    Patient seen in collaboration with interdisciplinary team during LPS rounds in wound clinic for evaluation of right great toe amputation.  Ulcer initially started mid October 2020.  He was following up with the wound care clinic.  Pain wound with Betadine.  He had undergone right lower extremity atherectomy and angioplasty of the AT/PT and peroneal arteries beginning of November 2020.  He was following up at [facility] for evaluation of right great toe gangrene which had worsened and he was hospitalized from 11/13/2020-11/20/2020.  Underwent right first toe disarticulation at [facility] by Dr. Lynn.  He discharged home with outpatient referral, home health and on oral antibiotics.  He has completed his oral antibiotics.  Did not have any difficulty.  Patient now presents with ischemic changes to right second toe involving to dry gangrene.  He reports that this started approximately 1 week after discharge from hospital.  Patient complains of pain to his plantar first metatarsal.  He is wearing a postop shoe.  He does have an offloading boot however he reports that he is unbalanced.    Patient states blood sugar averages around 150  Denies fevers, chills, nausea, vomiting.        RESULTS:    Lab Results   Component Value Date/Time    HBA1C 7.8 (H) 11/14/2020 02:39 AM            OBJECTIVE FINDINGS:     Pulses: Unable to palpate pedal pulses.  With Doppler monophasic to right DP, biphasic to right PT  Right toes cold to touch extending to distal metatarsals      Wound:  Right great toe amputation  Sutures in place  Eschar extending on plantar aspect to first metatarsal  Necrosis has declared itself  Dry  Erythema to forefoot  Minimal edema  Pain to plantar forefoot    Right second toe:  Ischemia to plantar aspect with ischemia and necrosis starting to form to dorsal aspect  Dry                      PROCEDURE   Wound care completed by RN.  See note for further detail.    Surgical options  discussed by Dr. Hernandez.  Surgical options to include right TMA, right GSR if patient is able to heal TMA.   will need to obtain vascular input to determine level of amputation patient could heal.     PLAN:   Wound Care: Continue at [facility], continue with home health  Imaging/Labs: no further imaging/labs at this time  Offloading: Patient instructed to wear diabetic boot at all times for total 6 weeks if he undergoes TMA and GSR.  Patient to obtain an even up for left foot if he does undergo TMA.  Not established with orthotic company  Antibiotics: Rx for Augmentin 500/125 daily for 10 days sent to patient's pharmacy for suspected cellulitis to the forefoot.  Surgery: Vascular surgery to determine what level of amputation patient will be able to heal.  If patient is able to heal a TMA.  Dr. Lynn to plan for right TMA, right GSR.  If patient is unable to heal TMA, patient to undergo BKA  Referral: None      LPS Follow up: no further LPS appts at this time.          Please note that this dictation was created using voice recognition software. I have  worked with technical experts from [employer] to optimize the interface.  I have made every reasonable attempt to correct obvious errors, but there may be errors of grammar and possibly content that I did not discover before finalizing the note.

## 2020-12-31 NOTE — PROGRESS NOTES
POST-OP NOTE FOR LIMB PRESERVATION SERVICE    SURGERY DATE: 12/31/2020    PROCEDURE: Procedure(s):  AMPUTATION, BELOW KNEE ...    WEIGHT BEARING STATUS: Non Weight bearing    PT CONSULT: Yes    ANTIBIOTICS: standard post op abx    PLAN TO RETURN TO O.R.: No    WOUND CARE PLAN: d/c hemovac POD # 2 (saturday), turn off PCIO POD # 7    FOLLOW-UP: Follow up with  Sukh Aguero M.D.  in 2 weeks    DURABLE MEDICAL EQUIPMENT: Front Wheel Walker    OTHER: d/c to rehab when stable. F/u with morgan/efraín 2 weeks after d/c      Sukh Aguero M.D.

## 2020-12-31 NOTE — ANESTHESIA PREPROCEDURE EVALUATION
Relevant Problems   CARDIAC   (+) CAD (coronary artery disease)   (+) Coronary artery disease, non-occlusive   (+) Essential hypertension   (+) Left anterior fascicular block   (+) NSTEMI (non-ST elevated myocardial infarction) ([facility])   (+) Paroxysmal A-fib ([facility])   (+) Presence of stent in LAD coronary artery   (+) Right bundle branch block      GI   (+) Gastroesophageal reflux disease         (+) End stage renal disease on dialysis ([facility])   (+) Hepatic cyst   (+) Renal cyst      ENDO   (+) Hyperthyroidism   (+) Type 2 diabetes mellitus ([facility])       Physical Exam    Airway   Mallampati: II  TM distance: >3 FB  Neck ROM: full       Cardiovascular - normal exam  Rhythm: regular  Rate: normal  (-) murmur     Dental - normal exam           Pulmonary - normal exam  Breath sounds clear to auscultation     Abdominal    Neurological - normal exam                 Anesthesia Plan    ASA 4   ASA physical status 4 criteria: ESRD not undergoing regularly scheduled dialysis    Plan - general and peripheral nerve block     Peripheral nerve block will be post-op pain control  Airway plan will be LMA        Induction: intravenous    Postoperative Plan: Postoperative administration of opioids is intended.    Pertinent diagnostic labs and testing reviewed    Informed Consent:    Anesthetic plan and risks discussed with patient.    Use of blood products discussed with: patient whom consented to blood products.

## 2020-12-31 NOTE — H&P
Hospital Medicine History & Physical Note    Date of Service  12/31/2020    Primary Care Physician  Patito Edgar M.D.    Consultants  Orthopedic surgery- [facility]    Code Status  Prior    Chief Complaint  No chief complaint on file.    History of Presenting Illness  75 y.o. male with DM2, ESRD on HD, PAD, CAD s/p LAD stent, Hyperthyroidism, pAfib on Eliquis, and HLD who presented 12/31/2020 here for a planned right BKA. He was discharged on 11/19 after a right great toe ray amputation on 11/16 for a nonhealing wound. He was being followed up outpatient and when his amputation site wasn't healing secondary to PAD, it was decided that the best course of action would be a BKA.     Today, he is with his wife in pre-op and in no acute distress. He did have his HD session this morning. Endorses some dizziness but thinks it's 2/2 not eating today and having HD. Gentle IVF currently running while he is in pre-op.     Review of Systems  Review of Systems   Constitutional: Positive for malaise/fatigue. Negative for chills and fever.   HENT: Positive for congestion.    Respiratory: Negative for shortness of breath.    Cardiovascular: Negative for chest pain, palpitations and leg swelling.   Gastrointestinal: Negative for abdominal pain, nausea and vomiting.   Genitourinary: Negative for dysuria.   Musculoskeletal: Positive for myalgias. Negative for falls.   Neurological: Negative for dizziness, sensory change, speech change, loss of consciousness and headaches.   Psychiatric/Behavioral: Negative.  Negative for depression. The patient is not nervous/anxious.    All other systems reviewed and are negative.      Past Medical History   has a past medical history of Arrhythmia, Arthritis (12/29/2020), CAD (coronary artery disease), Chronic anticoagulation (3/26/2020), Chronic kidney disease (CKD), stage V ([facility]), Congestive heart failure (HCC), Dental disorder (12/29/2020), Diabetes, Hemodialysis patient ([facility]),  "Hypertension, Kidney failure, Myocardial infarct (HCC), Osteoarthritis, Peripheral neuropathy, Pneumonia, and Sleep apnea.    Surgical History   has a past surgical history that includes appendectomy; other orthopedic surgery; other cardiac surgery; toe amputation (Left, 5/17/2020); tenotomy (Left, 5/17/2020); and toe amputation (Right, 11/16/2020).     Family History  family history includes Alcohol/Drug in his father; Diabetes in his brother; Heart Disease in his mother; Thyroid in his son.     Social History   reports that he quit smoking about 6 years ago. His smoking use included cigarettes. He has a 55.00 pack-year smoking history. He has never used smokeless tobacco. He reports previous alcohol use. He reports that he does not use drugs.  H/o smoking. No alcohol or illicit drug use.    Allergies  Allergies   Allergen Reactions   • Mircera [Methoxy Polyethylene Glycol-Epoetin Beta] Hives   • Other Drug      \"Opiods\" caused  hallucinations       Medications  Prior to Admission Medications   Prescriptions Last Dose Informant Patient Reported? Taking?   acetaminophen (TYLENOL) 325 MG Tab   Yes No   Sig: Take 650 mg by mouth every four hours as needed.   apixaban (ELIQUIS) 2.5mg Tab   No No   Sig: Take 1 Tab by mouth 2 Times a Day.   atorvastatin (LIPITOR) 40 MG Tab  Patient No No   Sig: Take 1 Tab by mouth every bedtime.   Patient not taking: Reported on 12/29/2020   calcitRIOL (ROCALTROL) 0.25 MCG Cap  Patient Yes No   Sig: Take 0.25 mcg by mouth every day.   calcium acetate (PHOS-LO) 667 MG Cap  Patient Yes No   Sig: Take 1,334 mg by mouth 3 times a day, with meals.   carvedilol (COREG) 3.125 MG Tab  Patient Yes No   Sig: Take 3.125 mg by mouth 2 times a day, with meals.   guaiFENesin ER (MUCINEX) 600 MG TABLET SR 12 HR   No No   Sig: Take 1 Tab by mouth every 12 hours.   ibuprofen (MOTRIN) 200 MG Tab   Yes No   Sig: Take 200 mg by mouth every 6 hours as needed.   insulin glargine (LANTUS SOLOSTAR) 100 UNIT/ML " Solution Pen-injector injection  Patient Yes No   Sig: Inject 5 Units as instructed every bedtime.   methimazole (TAPAZOLE) 5 MG Tab   No No   Sig: TAKE 1.5 TABLETS BY MOUTH  IN THE MORNING AND 1 TABLET IN THE  EVENING   ondansetron (ZOFRAN ODT) 4 MG TABLET DISPERSIBLE   Yes No   pantoprazole (PROTONIX) 40 MG Tablet Delayed Response  Patient Yes No   Sig: Take 40 mg by mouth every bedtime.      Facility-Administered Medications: None       Physical Exam     Physical Exam  Vitals signs reviewed.   Constitutional:       General: He is not in acute distress.     Appearance: He is not ill-appearing or diaphoretic.   HENT:      Head: Normocephalic.      Mouth/Throat:      Mouth: Mucous membranes are moist.   Eyes:      General: No scleral icterus.     Extraocular Movements: Extraocular movements intact.   Neck:      Musculoskeletal: Normal range of motion. No neck rigidity.   Cardiovascular:      Rate and Rhythm: Normal rate.      Pulses: Normal pulses.   Pulmonary:      Effort: Pulmonary effort is normal. No respiratory distress.      Breath sounds: No wheezing or rales.   Abdominal:      General: There is no distension.      Palpations: Abdomen is soft.      Tenderness: There is no abdominal tenderness. There is no guarding.   Musculoskeletal:         General: No tenderness.      Right lower leg: Edema present.      Left lower leg: Edema present.      Comments: Left AV fistula with good thrill; bilateral great toes amputated; right great toe not healing   Skin:     General: Skin is dry.   Neurological:      General: No focal deficit present.      Mental Status: He is alert and oriented to person, place, and time. Mental status is at baseline.   Psychiatric:         Mood and Affect: Mood normal.         Speech: Speech normal.         Behavior: Behavior normal. Behavior is cooperative.         Thought Content: Thought content normal.         Laboratory:          No results for input(s): ALTSGPT, ASTSGOT, ALKPHOSPHAT,  TBILIRUBIN, DBILIRUBIN, GAMMAGT, AMYLASE, LIPASE, ALB, PREALBUMIN, GLUCOSE in the last 72 hours.      No results for input(s): NTPROBNP in the last 72 hours.      No results for input(s): TROPONINT in the last 72 hours.    Imaging:  No orders to display         Assessment/Plan:  I anticipate this patient will require at least two midnights for appropriate medical management, necessitating inpatient admission.    * Wound of right foot- (present on admission)  Assessment & Plan  Nonhealing incision s/p right great toe ray amputation in Nov 2020.   - planned BKA today  - Orthopedic surgery following, appreciate recs  - pain control  - post operative ancef  - PT/OT to evaluate    Peripheral vascular disease (HCC)- (present on admission)  Assessment & Plan  Complicating wound healing. Continue to reduce risk factors.  - s/p angiogram and thrombectomy in April 2020  - holding eliquis for BKA today    End stage renal disease on dialysis (HCC)- (present on admission)  Assessment & Plan  On HD- TTS  - consult [facility] tomorrow for maintenance HD  - renal/DM diet  - continue phos binder  - EPO per nephro    Type 2 diabetes mellitus (HCC)- (present on admission)  Assessment & Plan  With hyperglycemia, A1C 7.8%.   - lantus 5 U nightly  - DM/renal diet and hypoglycemia protocol  - sensitive sliding scale q6 hrs as he's currently NPO for the surgery    Paroxysmal A-fib (HCC)- (present on admission)  Assessment & Plan  Rate controlled  - resume home coreg tonight, with holding parameters  - resume home eliquis when ok'd by surgery    Dyslipidemia- (present on admission)  Assessment & Plan  Reports that he hasn't been taking his atorvastatin. Lipid panel from 11/14 showed elevated TAGs and LDL at 85  - resume atorvastatin    Essential hypertension- (present on admission)  Assessment & Plan  Normotensive.   - resume home coreg with holding parameters    Hyperthyroidism- (present on admission)  Assessment & Plan  -  resume home methimazole

## 2021-01-01 ENCOUNTER — APPOINTMENT (OUTPATIENT)
Dept: RADIOLOGY | Facility: MEDICAL CENTER | Age: 76
DRG: 252 | End: 2021-01-01
Attending: STUDENT IN AN ORGANIZED HEALTH CARE EDUCATION/TRAINING PROGRAM
Payer: MEDICARE

## 2021-01-01 ENCOUNTER — APPOINTMENT (OUTPATIENT)
Dept: RADIOLOGY | Facility: MEDICAL CENTER | Age: 76
DRG: 252 | End: 2021-01-01
Attending: EMERGENCY MEDICINE
Payer: MEDICARE

## 2021-01-01 ENCOUNTER — PATIENT OUTREACH (OUTPATIENT)
Dept: MEDICAL GROUP | Age: 76
End: 2021-01-01

## 2021-01-01 ENCOUNTER — APPOINTMENT (OUTPATIENT)
Dept: RADIOLOGY | Facility: MEDICAL CENTER | Age: 76
DRG: 616 | End: 2021-01-01
Attending: FAMILY MEDICINE
Payer: MEDICARE

## 2021-01-01 ENCOUNTER — APPOINTMENT (OUTPATIENT)
Dept: RADIOLOGY | Facility: REHABILITATION | Age: 76
DRG: 559 | End: 2021-01-01
Attending: HOSPITALIST
Payer: MEDICARE

## 2021-01-01 ENCOUNTER — APPOINTMENT (OUTPATIENT)
Dept: RADIOLOGY | Facility: MEDICAL CENTER | Age: 76
DRG: 239 | End: 2021-01-01
Attending: INTERNAL MEDICINE
Payer: MEDICARE

## 2021-01-01 ENCOUNTER — APPOINTMENT (OUTPATIENT)
Dept: RADIOLOGY | Facility: MEDICAL CENTER | Age: 76
DRG: 291 | End: 2021-01-01
Attending: INTERNAL MEDICINE
Payer: MEDICARE

## 2021-01-01 ENCOUNTER — APPOINTMENT (OUTPATIENT)
Dept: RADIOLOGY | Facility: MEDICAL CENTER | Age: 76
DRG: 616 | End: 2021-01-01
Attending: STUDENT IN AN ORGANIZED HEALTH CARE EDUCATION/TRAINING PROGRAM
Payer: MEDICARE

## 2021-01-01 ENCOUNTER — APPOINTMENT (OUTPATIENT)
Dept: RADIOLOGY | Facility: MEDICAL CENTER | Age: 76
End: 2021-01-01
Attending: EMERGENCY MEDICINE
Payer: MEDICARE

## 2021-01-01 ENCOUNTER — APPOINTMENT (OUTPATIENT)
Dept: CARDIOLOGY | Facility: MEDICAL CENTER | Age: 76
DRG: 250 | End: 2021-01-01
Attending: INTERNAL MEDICINE
Payer: MEDICARE

## 2021-01-01 ENCOUNTER — HOSPITAL ENCOUNTER (INPATIENT)
Facility: REHABILITATION | Age: 76
End: 2021-01-01
Attending: PHYSICAL MEDICINE & REHABILITATION | Admitting: PHYSICAL MEDICINE & REHABILITATION
Payer: MEDICARE

## 2021-01-01 ENCOUNTER — APPOINTMENT (OUTPATIENT)
Dept: RADIOLOGY | Facility: MEDICAL CENTER | Age: 76
DRG: 239 | End: 2021-01-01
Attending: STUDENT IN AN ORGANIZED HEALTH CARE EDUCATION/TRAINING PROGRAM
Payer: MEDICARE

## 2021-01-01 ENCOUNTER — TELEPHONE (OUTPATIENT)
Dept: CARDIOLOGY | Facility: MEDICAL CENTER | Age: 76
End: 2021-01-01

## 2021-01-01 ENCOUNTER — ANESTHESIA EVENT (OUTPATIENT)
Dept: SURGERY | Facility: MEDICAL CENTER | Age: 76
DRG: 252 | End: 2021-01-01
Payer: MEDICARE

## 2021-01-01 ENCOUNTER — ANESTHESIA (OUTPATIENT)
Dept: SURGERY | Facility: MEDICAL CENTER | Age: 76
DRG: 239 | End: 2021-01-01
Payer: MEDICARE

## 2021-01-01 ENCOUNTER — HOSPITAL ENCOUNTER (OUTPATIENT)
Facility: MEDICAL CENTER | Age: 76
End: 2021-08-03
Attending: EMERGENCY MEDICINE | Admitting: INTERNAL MEDICINE
Payer: MEDICARE

## 2021-01-01 ENCOUNTER — APPOINTMENT (OUTPATIENT)
Dept: RADIOLOGY | Facility: MEDICAL CENTER | Age: 76
DRG: 252 | End: 2021-01-01
Attending: HOSPITALIST
Payer: MEDICARE

## 2021-01-01 ENCOUNTER — APPOINTMENT (OUTPATIENT)
Dept: RADIOLOGY | Facility: MEDICAL CENTER | Age: 76
DRG: 250 | End: 2021-01-01
Attending: EMERGENCY MEDICINE
Payer: MEDICARE

## 2021-01-01 ENCOUNTER — APPOINTMENT (OUTPATIENT)
Dept: RADIOLOGY | Facility: MEDICAL CENTER | Age: 76
DRG: 252 | End: 2021-01-01
Attending: SURGERY
Payer: MEDICARE

## 2021-01-01 ENCOUNTER — OFFICE VISIT (OUTPATIENT)
Dept: CARDIOLOGY | Facility: MEDICAL CENTER | Age: 76
End: 2021-01-01
Payer: MEDICARE

## 2021-01-01 ENCOUNTER — APPOINTMENT (OUTPATIENT)
Dept: RADIOLOGY | Facility: MEDICAL CENTER | Age: 76
DRG: 239 | End: 2021-01-01
Attending: NURSE PRACTITIONER
Payer: MEDICARE

## 2021-01-01 ENCOUNTER — APPOINTMENT (OUTPATIENT)
Dept: CARDIOLOGY | Facility: MEDICAL CENTER | Age: 76
End: 2021-01-01
Payer: MEDICARE

## 2021-01-01 ENCOUNTER — PATIENT MESSAGE (OUTPATIENT)
Dept: CARDIOLOGY | Facility: MEDICAL CENTER | Age: 76
End: 2021-01-01

## 2021-01-01 ENCOUNTER — APPOINTMENT (OUTPATIENT)
Dept: CARDIOLOGY | Facility: MEDICAL CENTER | Age: 76
DRG: 250 | End: 2021-01-01
Attending: PHYSICIAN ASSISTANT
Payer: MEDICARE

## 2021-01-01 ENCOUNTER — HOSPITAL ENCOUNTER (INPATIENT)
Facility: MEDICAL CENTER | Age: 76
LOS: 17 days | DRG: 239 | End: 2021-06-06
Attending: EMERGENCY MEDICINE | Admitting: HOSPITALIST
Payer: MEDICARE

## 2021-01-01 ENCOUNTER — NURSE TRIAGE (OUTPATIENT)
Dept: HEALTH INFORMATION MANAGEMENT | Facility: OTHER | Age: 76
End: 2021-01-01

## 2021-01-01 ENCOUNTER — APPOINTMENT (OUTPATIENT)
Dept: RADIOLOGY | Facility: MEDICAL CENTER | Age: 76
DRG: 250 | End: 2021-01-01
Payer: MEDICARE

## 2021-01-01 ENCOUNTER — APPOINTMENT (OUTPATIENT)
Dept: RADIOLOGY | Facility: MEDICAL CENTER | Age: 76
DRG: 291 | End: 2021-01-01
Attending: EMERGENCY MEDICINE
Payer: MEDICARE

## 2021-01-01 ENCOUNTER — PATIENT OUTREACH (OUTPATIENT)
Dept: HEALTH INFORMATION MANAGEMENT | Facility: OTHER | Age: 76
End: 2021-01-01

## 2021-01-01 ENCOUNTER — APPOINTMENT (OUTPATIENT)
Dept: RADIOLOGY | Facility: MEDICAL CENTER | Age: 76
DRG: 239 | End: 2021-01-01
Attending: EMERGENCY MEDICINE
Payer: MEDICARE

## 2021-01-01 ENCOUNTER — TELEPHONE (OUTPATIENT)
Dept: MEDICAL GROUP | Age: 76
End: 2021-01-01

## 2021-01-01 ENCOUNTER — HOSPITAL ENCOUNTER (INPATIENT)
Facility: REHABILITATION | Age: 76
LOS: 16 days | DRG: 559 | End: 2021-01-29
Attending: PHYSICAL MEDICINE & REHABILITATION | Admitting: PHYSICAL MEDICINE & REHABILITATION
Payer: MEDICARE

## 2021-01-01 ENCOUNTER — APPOINTMENT (OUTPATIENT)
Dept: RADIOLOGY | Facility: MEDICAL CENTER | Age: 76
DRG: 616 | End: 2021-01-01
Attending: INTERNAL MEDICINE
Payer: MEDICARE

## 2021-01-01 ENCOUNTER — ANESTHESIA (OUTPATIENT)
Dept: SURGERY | Facility: MEDICAL CENTER | Age: 76
DRG: 252 | End: 2021-01-01
Payer: MEDICARE

## 2021-01-01 ENCOUNTER — HOSPITAL ENCOUNTER (INPATIENT)
Facility: MEDICAL CENTER | Age: 76
LOS: 15 days | DRG: 291 | End: 2021-07-22
Attending: EMERGENCY MEDICINE | Admitting: INTERNAL MEDICINE
Payer: MEDICARE

## 2021-01-01 ENCOUNTER — ANESTHESIA EVENT (OUTPATIENT)
Dept: SURGERY | Facility: MEDICAL CENTER | Age: 76
DRG: 239 | End: 2021-01-01
Payer: MEDICARE

## 2021-01-01 ENCOUNTER — HOSPITAL ENCOUNTER (INPATIENT)
Facility: MEDICAL CENTER | Age: 76
LOS: 8 days | DRG: 250 | End: 2021-05-18
Attending: EMERGENCY MEDICINE | Admitting: INTERNAL MEDICINE
Payer: MEDICARE

## 2021-01-01 ENCOUNTER — APPOINTMENT (OUTPATIENT)
Dept: RADIOLOGY | Facility: MEDICAL CENTER | Age: 76
DRG: 250 | End: 2021-01-01
Attending: SURGERY
Payer: MEDICARE

## 2021-01-01 ENCOUNTER — HOSPITAL ENCOUNTER (INPATIENT)
Facility: MEDICAL CENTER | Age: 76
LOS: 10 days | DRG: 252 | End: 2021-06-28
Attending: EMERGENCY MEDICINE | Admitting: HOSPITALIST
Payer: MEDICARE

## 2021-01-01 VITALS
HEART RATE: 77 BPM | BODY MASS INDEX: 25.47 KG/M2 | SYSTOLIC BLOOD PRESSURE: 124 MMHG | DIASTOLIC BLOOD PRESSURE: 56 MMHG | WEIGHT: 158.51 LBS | HEIGHT: 66 IN | TEMPERATURE: 97.6 F | RESPIRATION RATE: 18 BRPM | OXYGEN SATURATION: 98 %

## 2021-01-01 VITALS
BODY MASS INDEX: 29.2 KG/M2 | DIASTOLIC BLOOD PRESSURE: 57 MMHG | OXYGEN SATURATION: 97 % | WEIGHT: 175.27 LBS | HEIGHT: 65 IN | RESPIRATION RATE: 17 BRPM | HEART RATE: 90 BPM | TEMPERATURE: 97.8 F | SYSTOLIC BLOOD PRESSURE: 106 MMHG

## 2021-01-01 VITALS
TEMPERATURE: 97.4 F | HEIGHT: 66 IN | SYSTOLIC BLOOD PRESSURE: 107 MMHG | BODY MASS INDEX: 25.23 KG/M2 | DIASTOLIC BLOOD PRESSURE: 73 MMHG | OXYGEN SATURATION: 94 % | WEIGHT: 156.97 LBS | RESPIRATION RATE: 18 BRPM | HEART RATE: 90 BPM

## 2021-01-01 VITALS
HEART RATE: 89 BPM | OXYGEN SATURATION: 95 % | SYSTOLIC BLOOD PRESSURE: 115 MMHG | HEIGHT: 66 IN | WEIGHT: 203.93 LBS | BODY MASS INDEX: 32.77 KG/M2 | DIASTOLIC BLOOD PRESSURE: 52 MMHG | TEMPERATURE: 97.4 F | RESPIRATION RATE: 15 BRPM

## 2021-01-01 VITALS
HEIGHT: 67 IN | RESPIRATION RATE: 18 BRPM | WEIGHT: 151.01 LBS | BODY MASS INDEX: 23.7 KG/M2 | SYSTOLIC BLOOD PRESSURE: 94 MMHG | HEART RATE: 92 BPM | TEMPERATURE: 97.3 F | DIASTOLIC BLOOD PRESSURE: 42 MMHG | OXYGEN SATURATION: 96 %

## 2021-01-01 VITALS
OXYGEN SATURATION: 96 % | HEART RATE: 71 BPM | SYSTOLIC BLOOD PRESSURE: 86 MMHG | HEIGHT: 66 IN | RESPIRATION RATE: 16 BRPM | DIASTOLIC BLOOD PRESSURE: 58 MMHG | BODY MASS INDEX: 11.75 KG/M2

## 2021-01-01 VITALS
HEIGHT: 67 IN | HEART RATE: 65 BPM | WEIGHT: 162.7 LBS | DIASTOLIC BLOOD PRESSURE: 36 MMHG | TEMPERATURE: 97 F | SYSTOLIC BLOOD PRESSURE: 92 MMHG | BODY MASS INDEX: 25.54 KG/M2 | OXYGEN SATURATION: 90 % | RESPIRATION RATE: 16 BRPM

## 2021-01-01 VITALS
BODY MASS INDEX: 25.16 KG/M2 | DIASTOLIC BLOOD PRESSURE: 36 MMHG | HEIGHT: 66 IN | TEMPERATURE: 97.5 F | WEIGHT: 156.53 LBS | HEART RATE: 53 BPM | RESPIRATION RATE: 16 BRPM | SYSTOLIC BLOOD PRESSURE: 81 MMHG | OXYGEN SATURATION: 97 %

## 2021-01-01 DIAGNOSIS — M13.0 POLYARTHRITIS: ICD-10-CM

## 2021-01-01 DIAGNOSIS — I44.4 LEFT ANTERIOR FASCICULAR BLOCK: ICD-10-CM

## 2021-01-01 DIAGNOSIS — Z78.9 IMPAIRED MOBILITY AND ADLS: ICD-10-CM

## 2021-01-01 DIAGNOSIS — N18.6 END STAGE RENAL DISEASE ON DIALYSIS (HCC): ICD-10-CM

## 2021-01-01 DIAGNOSIS — R79.89 ELEVATED TROPONIN: ICD-10-CM

## 2021-01-01 DIAGNOSIS — J81.0 ACUTE PULMONARY EDEMA (HCC): ICD-10-CM

## 2021-01-01 DIAGNOSIS — E11.22 TYPE 2 DIABETES MELLITUS WITH CHRONIC KIDNEY DISEASE ON CHRONIC DIALYSIS, WITH LONG-TERM CURRENT USE OF INSULIN (HCC): ICD-10-CM

## 2021-01-01 DIAGNOSIS — I25.5 CARDIOMYOPATHY, ISCHEMIC: ICD-10-CM

## 2021-01-01 DIAGNOSIS — Z71.89 ADVANCE CARE PLANNING: ICD-10-CM

## 2021-01-01 DIAGNOSIS — I48.0 PAROXYSMAL A-FIB (HCC): ICD-10-CM

## 2021-01-01 DIAGNOSIS — R54 FRAILTY SYNDROME IN GERIATRIC PATIENT: ICD-10-CM

## 2021-01-01 DIAGNOSIS — Z89.511 STATUS POST BELOW-KNEE AMPUTATION OF RIGHT LOWER EXTREMITY (HCC): ICD-10-CM

## 2021-01-01 DIAGNOSIS — I25.10 CORONARY ARTERY DISEASE INVOLVING NATIVE CORONARY ARTERY OF NATIVE HEART WITHOUT ANGINA PECTORIS: ICD-10-CM

## 2021-01-01 DIAGNOSIS — I50.23 ACUTE ON CHRONIC SYSTOLIC CONGESTIVE HEART FAILURE (HCC): ICD-10-CM

## 2021-01-01 DIAGNOSIS — Z99.2 END STAGE RENAL DISEASE ON DIALYSIS (HCC): ICD-10-CM

## 2021-01-01 DIAGNOSIS — Z99.2 STAGE 5 CHRONIC KIDNEY DISEASE ON CHRONIC DIALYSIS (HCC): ICD-10-CM

## 2021-01-01 DIAGNOSIS — N18.6 STAGE 5 CHRONIC KIDNEY DISEASE ON CHRONIC DIALYSIS (HCC): ICD-10-CM

## 2021-01-01 DIAGNOSIS — I96 TOE NECROSIS (HCC): ICD-10-CM

## 2021-01-01 DIAGNOSIS — S09.90XA TRAUMATIC INJURY OF HEAD, INITIAL ENCOUNTER: ICD-10-CM

## 2021-01-01 DIAGNOSIS — Z89.511 STATUS POST BELOW-KNEE AMPUTATION OF BOTH LOWER EXTREMITIES (HCC): ICD-10-CM

## 2021-01-01 DIAGNOSIS — N18.6 TYPE 2 DIABETES MELLITUS WITH CHRONIC KIDNEY DISEASE ON CHRONIC DIALYSIS, WITH LONG-TERM CURRENT USE OF INSULIN (HCC): ICD-10-CM

## 2021-01-01 DIAGNOSIS — A41.9 SEPTIC SHOCK (HCC): ICD-10-CM

## 2021-01-01 DIAGNOSIS — Z89.512 S/P BKA (BELOW KNEE AMPUTATION) UNILATERAL, LEFT (HCC): ICD-10-CM

## 2021-01-01 DIAGNOSIS — Z23 NEED FOR VACCINATION: ICD-10-CM

## 2021-01-01 DIAGNOSIS — R10.84 GENERALIZED ABDOMINAL PAIN: ICD-10-CM

## 2021-01-01 DIAGNOSIS — I51.89 DIASTOLIC DYSFUNCTION: ICD-10-CM

## 2021-01-01 DIAGNOSIS — G89.29 OTHER CHRONIC PAIN: ICD-10-CM

## 2021-01-01 DIAGNOSIS — E11.9 DIABETES MELLITUS TYPE 2 IN NONOBESE (HCC): ICD-10-CM

## 2021-01-01 DIAGNOSIS — Z79.4 TYPE 2 DIABETES MELLITUS WITH CHRONIC KIDNEY DISEASE ON CHRONIC DIALYSIS, WITH LONG-TERM CURRENT USE OF INSULIN (HCC): ICD-10-CM

## 2021-01-01 DIAGNOSIS — S98.112A AMPUTATED GREAT TOE OF LEFT FOOT (HCC): ICD-10-CM

## 2021-01-01 DIAGNOSIS — I49.01 VENTRICULAR FIBRILLATION (HCC): ICD-10-CM

## 2021-01-01 DIAGNOSIS — Z66 DNR (DO NOT RESUSCITATE): ICD-10-CM

## 2021-01-01 DIAGNOSIS — I48.91 ATRIAL FIBRILLATION WITH RVR (HCC): ICD-10-CM

## 2021-01-01 DIAGNOSIS — E78.5 DYSLIPIDEMIA: ICD-10-CM

## 2021-01-01 DIAGNOSIS — Z99.2 TYPE 2 DIABETES MELLITUS WITH CHRONIC KIDNEY DISEASE ON CHRONIC DIALYSIS, WITH LONG-TERM CURRENT USE OF INSULIN (HCC): ICD-10-CM

## 2021-01-01 DIAGNOSIS — I99.8 ISCHEMIA OF FINGER: ICD-10-CM

## 2021-01-01 DIAGNOSIS — E87.70 HYPERVOLEMIA, UNSPECIFIED HYPERVOLEMIA TYPE: ICD-10-CM

## 2021-01-01 DIAGNOSIS — E05.90 HYPERTHYROIDISM: ICD-10-CM

## 2021-01-01 DIAGNOSIS — J90 PLEURAL EFFUSION: ICD-10-CM

## 2021-01-01 DIAGNOSIS — I50.9 CHRONIC CONGESTIVE HEART FAILURE, UNSPECIFIED HEART FAILURE TYPE (HCC): ICD-10-CM

## 2021-01-01 DIAGNOSIS — N25.81 SECONDARY HYPERPARATHYROIDISM OF RENAL ORIGIN (HCC): ICD-10-CM

## 2021-01-01 DIAGNOSIS — I73.9 PERIPHERAL VASCULAR DISEASE (HCC): ICD-10-CM

## 2021-01-01 DIAGNOSIS — J96.01 ACUTE RESPIRATORY FAILURE WITH HYPOXIA (HCC): ICD-10-CM

## 2021-01-01 DIAGNOSIS — Z89.512 STATUS POST BELOW-KNEE AMPUTATION OF BOTH LOWER EXTREMITIES (HCC): ICD-10-CM

## 2021-01-01 DIAGNOSIS — I99.8 ISCHEMIA OF TOE: ICD-10-CM

## 2021-01-01 DIAGNOSIS — Z74.09 IMPAIRED MOBILITY AND ADLS: ICD-10-CM

## 2021-01-01 DIAGNOSIS — L03.116 CELLULITIS OF LEFT FOOT: ICD-10-CM

## 2021-01-01 DIAGNOSIS — L03.119 CELLULITIS OF FOOT: ICD-10-CM

## 2021-01-01 DIAGNOSIS — R06.02 SHORTNESS OF BREATH: ICD-10-CM

## 2021-01-01 DIAGNOSIS — I50.22 CHRONIC SYSTOLIC CONGESTIVE HEART FAILURE (HCC): ICD-10-CM

## 2021-01-01 DIAGNOSIS — N40.0 BENIGN PROSTATIC HYPERPLASIA WITHOUT LOWER URINARY TRACT SYMPTOMS: ICD-10-CM

## 2021-01-01 DIAGNOSIS — J96.20 ACUTE ON CHRONIC RESPIRATORY FAILURE, UNSPECIFIED WHETHER WITH HYPOXIA OR HYPERCAPNIA (HCC): ICD-10-CM

## 2021-01-01 DIAGNOSIS — E87.1 HYPONATREMIA: ICD-10-CM

## 2021-01-01 DIAGNOSIS — Z89.512 STATUS POST BELOW KNEE AMPUTATION, LEFT (HCC): ICD-10-CM

## 2021-01-01 DIAGNOSIS — I95.3 HEMODIALYSIS-ASSOCIATED HYPOTENSION: ICD-10-CM

## 2021-01-01 DIAGNOSIS — R65.21 SEPTIC SHOCK (HCC): ICD-10-CM

## 2021-01-01 DIAGNOSIS — R33.9 URINARY RETENTION: ICD-10-CM

## 2021-01-01 DIAGNOSIS — I10 ESSENTIAL HYPERTENSION: ICD-10-CM

## 2021-01-01 DIAGNOSIS — Z79.01 ANTICOAGULATED: ICD-10-CM

## 2021-01-01 DIAGNOSIS — I46.9 CARDIAC ARREST (HCC): ICD-10-CM

## 2021-01-01 DIAGNOSIS — J96.11 CHRONIC RESPIRATORY FAILURE WITH HYPOXIA (HCC): ICD-10-CM

## 2021-01-01 LAB
25(OH)D3 SERPL-MCNC: 48 NG/ML (ref 30–100)
ABO GROUP BLD: ABNORMAL
ACT BLD: 307 SEC (ref 74–137)
ALBUMIN SERPL BCP-MCNC: 2.3 G/DL (ref 3.2–4.9)
ALBUMIN SERPL BCP-MCNC: 2.4 G/DL (ref 3.2–4.9)
ALBUMIN SERPL BCP-MCNC: 2.4 G/DL (ref 3.2–4.9)
ALBUMIN SERPL BCP-MCNC: 2.5 G/DL (ref 3.2–4.9)
ALBUMIN SERPL BCP-MCNC: 2.6 G/DL (ref 3.2–4.9)
ALBUMIN SERPL BCP-MCNC: 2.7 G/DL (ref 3.2–4.9)
ALBUMIN SERPL BCP-MCNC: 2.8 G/DL (ref 3.2–4.9)
ALBUMIN SERPL BCP-MCNC: 2.9 G/DL (ref 3.2–4.9)
ALBUMIN SERPL BCP-MCNC: 3 G/DL (ref 3.2–4.9)
ALBUMIN SERPL BCP-MCNC: 3 G/DL (ref 3.2–4.9)
ALBUMIN SERPL BCP-MCNC: 3.1 G/DL (ref 3.2–4.9)
ALBUMIN SERPL BCP-MCNC: 3.2 G/DL (ref 3.2–4.9)
ALBUMIN SERPL BCP-MCNC: 3.3 G/DL (ref 3.2–4.9)
ALBUMIN SERPL BCP-MCNC: 3.4 G/DL (ref 3.2–4.9)
ALBUMIN SERPL BCP-MCNC: 3.5 G/DL (ref 3.2–4.9)
ALBUMIN SERPL BCP-MCNC: 3.7 G/DL (ref 3.2–4.9)
ALBUMIN SERPL BCP-MCNC: 3.8 G/DL (ref 3.2–4.9)
ALBUMIN SERPL BCP-MCNC: 3.8 G/DL (ref 3.2–4.9)
ALBUMIN SERPL BCP-MCNC: 4.1 G/DL (ref 3.2–4.9)
ALBUMIN/GLOB SERPL: 0.7 G/DL
ALBUMIN/GLOB SERPL: 0.9 G/DL
ALBUMIN/GLOB SERPL: 1 G/DL
ALBUMIN/GLOB SERPL: 1.1 G/DL
ALBUMIN/GLOB SERPL: 1.2 G/DL
ALBUMIN/GLOB SERPL: 1.3 G/DL
ALBUMIN/GLOB SERPL: 1.3 G/DL
ALP SERPL-CCNC: 103 U/L (ref 30–99)
ALP SERPL-CCNC: 106 U/L (ref 30–99)
ALP SERPL-CCNC: 107 U/L (ref 30–99)
ALP SERPL-CCNC: 108 U/L (ref 30–99)
ALP SERPL-CCNC: 108 U/L (ref 30–99)
ALP SERPL-CCNC: 111 U/L (ref 30–99)
ALP SERPL-CCNC: 113 U/L (ref 30–99)
ALP SERPL-CCNC: 115 U/L (ref 30–99)
ALP SERPL-CCNC: 115 U/L (ref 30–99)
ALP SERPL-CCNC: 116 U/L (ref 30–99)
ALP SERPL-CCNC: 116 U/L (ref 30–99)
ALP SERPL-CCNC: 117 U/L (ref 30–99)
ALP SERPL-CCNC: 119 U/L (ref 30–99)
ALP SERPL-CCNC: 119 U/L (ref 30–99)
ALP SERPL-CCNC: 120 U/L (ref 30–99)
ALP SERPL-CCNC: 123 U/L (ref 30–99)
ALP SERPL-CCNC: 128 U/L (ref 30–99)
ALP SERPL-CCNC: 129 U/L (ref 30–99)
ALP SERPL-CCNC: 133 U/L (ref 30–99)
ALP SERPL-CCNC: 139 U/L (ref 30–99)
ALP SERPL-CCNC: 147 U/L (ref 30–99)
ALP SERPL-CCNC: 78 U/L (ref 30–99)
ALP SERPL-CCNC: 81 U/L (ref 30–99)
ALP SERPL-CCNC: 85 U/L (ref 30–99)
ALP SERPL-CCNC: 87 U/L (ref 30–99)
ALP SERPL-CCNC: 88 U/L (ref 30–99)
ALP SERPL-CCNC: 94 U/L (ref 30–99)
ALP SERPL-CCNC: 99 U/L (ref 30–99)
ALT SERPL-CCNC: 10 U/L (ref 2–50)
ALT SERPL-CCNC: 11 U/L (ref 2–50)
ALT SERPL-CCNC: 11 U/L (ref 2–50)
ALT SERPL-CCNC: 13 U/L (ref 2–50)
ALT SERPL-CCNC: 5 U/L (ref 2–50)
ALT SERPL-CCNC: 6 U/L (ref 2–50)
ALT SERPL-CCNC: 6 U/L (ref 2–50)
ALT SERPL-CCNC: 7 U/L (ref 2–50)
ALT SERPL-CCNC: 7 U/L (ref 2–50)
ALT SERPL-CCNC: 8 U/L (ref 2–50)
ALT SERPL-CCNC: 8 U/L (ref 2–50)
ALT SERPL-CCNC: 9 U/L (ref 2–50)
ALT SERPL-CCNC: <5 U/L (ref 2–50)
AMYLASE FLD-CCNC: 14 U/L
AMYLASE FLD-CCNC: 20 U/L
ANION GAP SERPL CALC-SCNC: 10 MMOL/L (ref 7–16)
ANION GAP SERPL CALC-SCNC: 11 MMOL/L (ref 7–16)
ANION GAP SERPL CALC-SCNC: 12 MMOL/L (ref 7–16)
ANION GAP SERPL CALC-SCNC: 13 MMOL/L (ref 7–16)
ANION GAP SERPL CALC-SCNC: 14 MMOL/L (ref 7–16)
ANION GAP SERPL CALC-SCNC: 15 MMOL/L (ref 7–16)
ANION GAP SERPL CALC-SCNC: 16 MMOL/L (ref 7–16)
ANION GAP SERPL CALC-SCNC: 18 MMOL/L (ref 7–16)
ANION GAP SERPL CALC-SCNC: 19 MMOL/L (ref 7–16)
ANION GAP SERPL CALC-SCNC: 19 MMOL/L (ref 7–16)
ANION GAP SERPL CALC-SCNC: 20 MMOL/L (ref 7–16)
ANION GAP SERPL CALC-SCNC: 7 MMOL/L (ref 7–16)
ANION GAP SERPL CALC-SCNC: 7 MMOL/L (ref 7–16)
ANION GAP SERPL CALC-SCNC: 8 MMOL/L (ref 7–16)
ANION GAP SERPL CALC-SCNC: 9 MMOL/L (ref 7–16)
ANISOCYTOSIS BLD QL SMEAR: ABNORMAL
APPEARANCE FLD: CLEAR
APPEARANCE UR: ABNORMAL
APPEARANCE UR: CLEAR
APTT PPP: 37.3 SEC (ref 24.7–36)
APTT PPP: 43.7 SEC (ref 24.7–36)
AST SERPL-CCNC: 10 U/L (ref 12–45)
AST SERPL-CCNC: 10 U/L (ref 12–45)
AST SERPL-CCNC: 11 U/L (ref 12–45)
AST SERPL-CCNC: 11 U/L (ref 12–45)
AST SERPL-CCNC: 12 U/L (ref 12–45)
AST SERPL-CCNC: 14 U/L (ref 12–45)
AST SERPL-CCNC: 18 U/L (ref 12–45)
AST SERPL-CCNC: 19 U/L (ref 12–45)
AST SERPL-CCNC: 21 U/L (ref 12–45)
AST SERPL-CCNC: 6 U/L (ref 12–45)
AST SERPL-CCNC: 7 U/L (ref 12–45)
AST SERPL-CCNC: 8 U/L (ref 12–45)
AST SERPL-CCNC: 9 U/L (ref 12–45)
AST SERPL-CCNC: <5 U/L (ref 12–45)
BACTERIA #/AREA URNS HPF: NEGATIVE /HPF
BACTERIA BLD CULT: NORMAL
BACTERIA FLD AEROBE CULT: NORMAL
BACTERIA SPEC ANAEROBE CULT: NORMAL
BACTERIA UR CULT: NORMAL
BACTERIA UR CULT: NORMAL
BACTERIA WND AEROBE CULT: ABNORMAL
BARCODED ABORH UBTYP: 5100
BARCODED ABORH UBTYP: 6200
BARCODED ABORH UBTYP: 6200
BARCODED PRD CODE UBPRD: ABNORMAL
BARCODED UNIT NUM UBUNT: ABNORMAL
BASE EXCESS BLDA CALC-SCNC: 2 MMOL/L (ref -4–3)
BASOPHILS # BLD AUTO: 0 % (ref 0–1.8)
BASOPHILS # BLD AUTO: 0.2 % (ref 0–1.8)
BASOPHILS # BLD AUTO: 0.3 % (ref 0–1.8)
BASOPHILS # BLD AUTO: 0.4 % (ref 0–1.8)
BASOPHILS # BLD AUTO: 0.5 % (ref 0–1.8)
BASOPHILS # BLD AUTO: 0.6 % (ref 0–1.8)
BASOPHILS # BLD AUTO: 0.7 % (ref 0–1.8)
BASOPHILS # BLD AUTO: 0.8 % (ref 0–1.8)
BASOPHILS # BLD: 0 K/UL (ref 0–0.12)
BASOPHILS # BLD: 0.02 K/UL (ref 0–0.12)
BASOPHILS # BLD: 0.03 K/UL (ref 0–0.12)
BASOPHILS # BLD: 0.04 K/UL (ref 0–0.12)
BASOPHILS # BLD: 0.05 K/UL (ref 0–0.12)
BASOPHILS # BLD: 0.06 K/UL (ref 0–0.12)
BASOPHILS # BLD: 0.07 K/UL (ref 0–0.12)
BASOPHILS # BLD: 0.08 K/UL (ref 0–0.12)
BASOPHILS # BLD: 0.09 K/UL (ref 0–0.12)
BASOPHILS NFR FLD: 1 %
BILIRUB SERPL-MCNC: 0.2 MG/DL (ref 0.1–1.5)
BILIRUB SERPL-MCNC: 0.3 MG/DL (ref 0.1–1.5)
BILIRUB SERPL-MCNC: 0.4 MG/DL (ref 0.1–1.5)
BILIRUB SERPL-MCNC: 0.5 MG/DL (ref 0.1–1.5)
BILIRUB SERPL-MCNC: <0.2 MG/DL (ref 0.1–1.5)
BILIRUB UR QL STRIP.AUTO: NEGATIVE
BLD GP AB INVEST PLASRBC-IMP: ABNORMAL
BLD GP AB SCN SERPL QL: ABNORMAL
BODY FLD TYPE: NORMAL
BODY TEMPERATURE: ABNORMAL CENTIGRADE
BUN SERPL-MCNC: 13 MG/DL (ref 8–22)
BUN SERPL-MCNC: 16 MG/DL (ref 8–22)
BUN SERPL-MCNC: 16 MG/DL (ref 8–22)
BUN SERPL-MCNC: 19 MG/DL (ref 8–22)
BUN SERPL-MCNC: 21 MG/DL (ref 8–22)
BUN SERPL-MCNC: 22 MG/DL (ref 8–22)
BUN SERPL-MCNC: 23 MG/DL (ref 8–22)
BUN SERPL-MCNC: 24 MG/DL (ref 8–22)
BUN SERPL-MCNC: 25 MG/DL (ref 8–22)
BUN SERPL-MCNC: 26 MG/DL (ref 8–22)
BUN SERPL-MCNC: 27 MG/DL (ref 8–22)
BUN SERPL-MCNC: 28 MG/DL (ref 8–22)
BUN SERPL-MCNC: 28 MG/DL (ref 8–22)
BUN SERPL-MCNC: 29 MG/DL (ref 8–22)
BUN SERPL-MCNC: 30 MG/DL (ref 8–22)
BUN SERPL-MCNC: 32 MG/DL (ref 8–22)
BUN SERPL-MCNC: 33 MG/DL (ref 8–22)
BUN SERPL-MCNC: 34 MG/DL (ref 8–22)
BUN SERPL-MCNC: 34 MG/DL (ref 8–22)
BUN SERPL-MCNC: 35 MG/DL (ref 8–22)
BUN SERPL-MCNC: 36 MG/DL (ref 8–22)
BUN SERPL-MCNC: 37 MG/DL (ref 8–22)
BUN SERPL-MCNC: 38 MG/DL (ref 8–22)
BUN SERPL-MCNC: 39 MG/DL (ref 8–22)
BUN SERPL-MCNC: 40 MG/DL (ref 8–22)
BUN SERPL-MCNC: 41 MG/DL (ref 8–22)
BUN SERPL-MCNC: 42 MG/DL (ref 8–22)
BUN SERPL-MCNC: 42 MG/DL (ref 8–22)
BUN SERPL-MCNC: 44 MG/DL (ref 8–22)
BUN SERPL-MCNC: 45 MG/DL (ref 8–22)
BUN SERPL-MCNC: 47 MG/DL (ref 8–22)
BUN SERPL-MCNC: 49 MG/DL (ref 8–22)
BUN SERPL-MCNC: 50 MG/DL (ref 8–22)
BUN SERPL-MCNC: 51 MG/DL (ref 8–22)
BUN SERPL-MCNC: 52 MG/DL (ref 8–22)
BUN SERPL-MCNC: 53 MG/DL (ref 8–22)
BUN SERPL-MCNC: 54 MG/DL (ref 8–22)
CALCIUM SERPL-MCNC: 10.1 MG/DL (ref 8.5–10.5)
CALCIUM SERPL-MCNC: 10.2 MG/DL (ref 8.5–10.5)
CALCIUM SERPL-MCNC: 10.3 MG/DL (ref 8.5–10.5)
CALCIUM SERPL-MCNC: 10.8 MG/DL (ref 8.5–10.5)
CALCIUM SERPL-MCNC: 11.3 MG/DL (ref 8.5–10.5)
CALCIUM SERPL-MCNC: 8.4 MG/DL (ref 8.5–10.5)
CALCIUM SERPL-MCNC: 8.5 MG/DL (ref 8.5–10.5)
CALCIUM SERPL-MCNC: 8.6 MG/DL (ref 8.5–10.5)
CALCIUM SERPL-MCNC: 8.6 MG/DL (ref 8.5–10.5)
CALCIUM SERPL-MCNC: 8.7 MG/DL (ref 8.5–10.5)
CALCIUM SERPL-MCNC: 8.8 MG/DL (ref 8.5–10.5)
CALCIUM SERPL-MCNC: 8.9 MG/DL (ref 8.5–10.5)
CALCIUM SERPL-MCNC: 9 MG/DL (ref 8.5–10.5)
CALCIUM SERPL-MCNC: 9.1 MG/DL (ref 8.5–10.5)
CALCIUM SERPL-MCNC: 9.2 MG/DL (ref 8.5–10.5)
CALCIUM SERPL-MCNC: 9.3 MG/DL (ref 8.5–10.5)
CALCIUM SERPL-MCNC: 9.5 MG/DL (ref 8.5–10.5)
CALCIUM SERPL-MCNC: 9.6 MG/DL (ref 8.5–10.5)
CALCIUM SERPL-MCNC: 9.7 MG/DL (ref 8.5–10.5)
CALCIUM SERPL-MCNC: 9.8 MG/DL (ref 8.5–10.5)
CFT BLD TEG: 5.2 MIN (ref 5–10)
CHLORIDE SERPL-SCNC: 100 MMOL/L (ref 96–112)
CHLORIDE SERPL-SCNC: 100 MMOL/L (ref 96–112)
CHLORIDE SERPL-SCNC: 101 MMOL/L (ref 96–112)
CHLORIDE SERPL-SCNC: 106 MMOL/L (ref 96–112)
CHLORIDE SERPL-SCNC: 85 MMOL/L (ref 96–112)
CHLORIDE SERPL-SCNC: 87 MMOL/L (ref 96–112)
CHLORIDE SERPL-SCNC: 88 MMOL/L (ref 96–112)
CHLORIDE SERPL-SCNC: 89 MMOL/L (ref 96–112)
CHLORIDE SERPL-SCNC: 90 MMOL/L (ref 96–112)
CHLORIDE SERPL-SCNC: 91 MMOL/L (ref 96–112)
CHLORIDE SERPL-SCNC: 92 MMOL/L (ref 96–112)
CHLORIDE SERPL-SCNC: 93 MMOL/L (ref 96–112)
CHLORIDE SERPL-SCNC: 94 MMOL/L (ref 96–112)
CHLORIDE SERPL-SCNC: 95 MMOL/L (ref 96–112)
CHLORIDE SERPL-SCNC: 96 MMOL/L (ref 96–112)
CHLORIDE SERPL-SCNC: 97 MMOL/L (ref 96–112)
CHLORIDE SERPL-SCNC: 98 MMOL/L (ref 96–112)
CHLORIDE SERPL-SCNC: 99 MMOL/L (ref 96–112)
CHOLEST SERPL-MCNC: 158 MG/DL (ref 100–199)
CLOT ANGLE BLD TEG: ABNORMAL DEGREES (ref 53–72)
CLOT LYSIS 30M P MA LENFR BLD TEG: 0 % (ref 0–8)
CO2 SERPL-SCNC: 19 MMOL/L (ref 20–33)
CO2 SERPL-SCNC: 21 MMOL/L (ref 20–33)
CO2 SERPL-SCNC: 22 MMOL/L (ref 20–33)
CO2 SERPL-SCNC: 22 MMOL/L (ref 20–33)
CO2 SERPL-SCNC: 23 MMOL/L (ref 20–33)
CO2 SERPL-SCNC: 24 MMOL/L (ref 20–33)
CO2 SERPL-SCNC: 25 MMOL/L (ref 20–33)
CO2 SERPL-SCNC: 26 MMOL/L (ref 20–33)
CO2 SERPL-SCNC: 27 MMOL/L (ref 20–33)
CO2 SERPL-SCNC: 28 MMOL/L (ref 20–33)
CO2 SERPL-SCNC: 29 MMOL/L (ref 20–33)
CO2 SERPL-SCNC: 30 MMOL/L (ref 20–33)
CO2 SERPL-SCNC: 32 MMOL/L (ref 20–33)
COLOR FLD: YELLOW
COLOR UR: YELLOW
COMMENT 1642: NORMAL
COMPONENT R 8504R: ABNORMAL
CORTIS SERPL-MCNC: 11.7 UG/DL (ref 0–23)
CORTIS SERPL-MCNC: 7.9 UG/DL (ref 0–23)
COVID ORDER STATUS COVID19: NORMAL
COVID ORDER STATUS COVID19: NORMAL
CREAT SERPL-MCNC: 3.24 MG/DL (ref 0.5–1.4)
CREAT SERPL-MCNC: 3.4 MG/DL (ref 0.5–1.4)
CREAT SERPL-MCNC: 3.43 MG/DL (ref 0.5–1.4)
CREAT SERPL-MCNC: 3.72 MG/DL (ref 0.5–1.4)
CREAT SERPL-MCNC: 3.85 MG/DL (ref 0.5–1.4)
CREAT SERPL-MCNC: 3.88 MG/DL (ref 0.5–1.4)
CREAT SERPL-MCNC: 3.93 MG/DL (ref 0.5–1.4)
CREAT SERPL-MCNC: 3.97 MG/DL (ref 0.5–1.4)
CREAT SERPL-MCNC: 4 MG/DL (ref 0.5–1.4)
CREAT SERPL-MCNC: 4.14 MG/DL (ref 0.5–1.4)
CREAT SERPL-MCNC: 4.15 MG/DL (ref 0.5–1.4)
CREAT SERPL-MCNC: 4.15 MG/DL (ref 0.5–1.4)
CREAT SERPL-MCNC: 4.19 MG/DL (ref 0.5–1.4)
CREAT SERPL-MCNC: 4.21 MG/DL (ref 0.5–1.4)
CREAT SERPL-MCNC: 4.23 MG/DL (ref 0.5–1.4)
CREAT SERPL-MCNC: 4.3 MG/DL (ref 0.5–1.4)
CREAT SERPL-MCNC: 4.31 MG/DL (ref 0.5–1.4)
CREAT SERPL-MCNC: 4.31 MG/DL (ref 0.5–1.4)
CREAT SERPL-MCNC: 4.51 MG/DL (ref 0.5–1.4)
CREAT SERPL-MCNC: 4.61 MG/DL (ref 0.5–1.4)
CREAT SERPL-MCNC: 4.7 MG/DL (ref 0.5–1.4)
CREAT SERPL-MCNC: 4.75 MG/DL (ref 0.5–1.4)
CREAT SERPL-MCNC: 4.78 MG/DL (ref 0.5–1.4)
CREAT SERPL-MCNC: 4.83 MG/DL (ref 0.5–1.4)
CREAT SERPL-MCNC: 4.96 MG/DL (ref 0.5–1.4)
CREAT SERPL-MCNC: 4.97 MG/DL (ref 0.5–1.4)
CREAT SERPL-MCNC: 4.97 MG/DL (ref 0.5–1.4)
CREAT SERPL-MCNC: 5.03 MG/DL (ref 0.5–1.4)
CREAT SERPL-MCNC: 5.05 MG/DL (ref 0.5–1.4)
CREAT SERPL-MCNC: 5.07 MG/DL (ref 0.5–1.4)
CREAT SERPL-MCNC: 5.09 MG/DL (ref 0.5–1.4)
CREAT SERPL-MCNC: 5.12 MG/DL (ref 0.5–1.4)
CREAT SERPL-MCNC: 5.13 MG/DL (ref 0.5–1.4)
CREAT SERPL-MCNC: 5.16 MG/DL (ref 0.5–1.4)
CREAT SERPL-MCNC: 5.2 MG/DL (ref 0.5–1.4)
CREAT SERPL-MCNC: 5.21 MG/DL (ref 0.5–1.4)
CREAT SERPL-MCNC: 5.26 MG/DL (ref 0.5–1.4)
CREAT SERPL-MCNC: 5.36 MG/DL (ref 0.5–1.4)
CREAT SERPL-MCNC: 5.37 MG/DL (ref 0.5–1.4)
CREAT SERPL-MCNC: 5.4 MG/DL (ref 0.5–1.4)
CREAT SERPL-MCNC: 5.46 MG/DL (ref 0.5–1.4)
CREAT SERPL-MCNC: 5.56 MG/DL (ref 0.5–1.4)
CREAT SERPL-MCNC: 5.56 MG/DL (ref 0.5–1.4)
CREAT SERPL-MCNC: 5.57 MG/DL (ref 0.5–1.4)
CREAT SERPL-MCNC: 5.61 MG/DL (ref 0.5–1.4)
CREAT SERPL-MCNC: 5.62 MG/DL (ref 0.5–1.4)
CREAT SERPL-MCNC: 5.74 MG/DL (ref 0.5–1.4)
CREAT SERPL-MCNC: 5.74 MG/DL (ref 0.5–1.4)
CREAT SERPL-MCNC: 5.83 MG/DL (ref 0.5–1.4)
CREAT SERPL-MCNC: 5.85 MG/DL (ref 0.5–1.4)
CREAT SERPL-MCNC: 5.88 MG/DL (ref 0.5–1.4)
CREAT SERPL-MCNC: 6.03 MG/DL (ref 0.5–1.4)
CREAT SERPL-MCNC: 6.13 MG/DL (ref 0.5–1.4)
CREAT SERPL-MCNC: 6.16 MG/DL (ref 0.5–1.4)
CREAT SERPL-MCNC: 6.25 MG/DL (ref 0.5–1.4)
CREAT SERPL-MCNC: 6.41 MG/DL (ref 0.5–1.4)
CREAT SERPL-MCNC: 6.44 MG/DL (ref 0.5–1.4)
CREAT SERPL-MCNC: 6.45 MG/DL (ref 0.5–1.4)
CREAT SERPL-MCNC: 6.48 MG/DL (ref 0.5–1.4)
CREAT SERPL-MCNC: 6.49 MG/DL (ref 0.5–1.4)
CREAT SERPL-MCNC: 6.59 MG/DL (ref 0.5–1.4)
CREAT SERPL-MCNC: 6.59 MG/DL (ref 0.5–1.4)
CREAT SERPL-MCNC: 6.63 MG/DL (ref 0.5–1.4)
CREAT SERPL-MCNC: 6.66 MG/DL (ref 0.5–1.4)
CREAT SERPL-MCNC: 6.77 MG/DL (ref 0.5–1.4)
CREAT SERPL-MCNC: 6.89 MG/DL (ref 0.5–1.4)
CREAT SERPL-MCNC: 6.99 MG/DL (ref 0.5–1.4)
CREAT SERPL-MCNC: 7.36 MG/DL (ref 0.5–1.4)
CREAT SERPL-MCNC: 7.67 MG/DL (ref 0.5–1.4)
CREAT SERPL-MCNC: 7.82 MG/DL (ref 0.5–1.4)
CREAT SERPL-MCNC: 8.08 MG/DL (ref 0.5–1.4)
CREAT SERPL-MCNC: 8.37 MG/DL (ref 0.5–1.4)
CRP SERPL HS-MCNC: 11.2 MG/DL (ref 0–0.75)
CRP SERPL HS-MCNC: 9.47 MG/DL (ref 0–0.75)
CSF COMMENTS 1658: NORMAL
CT.EXTRINSIC BLD ROTEM: 1.1 MIN (ref 1–3)
CYTOLOGY REG CYTOL: NORMAL
DAT C3D-SP REAG RBC QL: ABNORMAL
DAT C3D-SP REAG RBC QL: ABNORMAL
DAT IGG-SP REAG RBC QL: ABNORMAL
DAT IGG-SP REAG RBC QL: ABNORMAL
EKG IMPRESSION: NORMAL
EOSINOPHIL # BLD AUTO: 0 K/UL (ref 0–0.51)
EOSINOPHIL # BLD AUTO: 0.01 K/UL (ref 0–0.51)
EOSINOPHIL # BLD AUTO: 0.02 K/UL (ref 0–0.51)
EOSINOPHIL # BLD AUTO: 0.03 K/UL (ref 0–0.51)
EOSINOPHIL # BLD AUTO: 0.03 K/UL (ref 0–0.51)
EOSINOPHIL # BLD AUTO: 0.04 K/UL (ref 0–0.51)
EOSINOPHIL # BLD AUTO: 0.07 K/UL (ref 0–0.51)
EOSINOPHIL # BLD AUTO: 0.07 K/UL (ref 0–0.51)
EOSINOPHIL # BLD AUTO: 0.08 K/UL (ref 0–0.51)
EOSINOPHIL # BLD AUTO: 0.09 K/UL (ref 0–0.51)
EOSINOPHIL # BLD AUTO: 0.1 K/UL (ref 0–0.51)
EOSINOPHIL # BLD AUTO: 0.11 K/UL (ref 0–0.51)
EOSINOPHIL # BLD AUTO: 0.12 K/UL (ref 0–0.51)
EOSINOPHIL # BLD AUTO: 0.12 K/UL (ref 0–0.51)
EOSINOPHIL # BLD AUTO: 0.13 K/UL (ref 0–0.51)
EOSINOPHIL # BLD AUTO: 0.16 K/UL (ref 0–0.51)
EOSINOPHIL # BLD AUTO: 0.17 K/UL (ref 0–0.51)
EOSINOPHIL # BLD AUTO: 0.2 K/UL (ref 0–0.51)
EOSINOPHIL # BLD AUTO: 0.27 K/UL (ref 0–0.51)
EOSINOPHIL # BLD AUTO: 0.29 K/UL (ref 0–0.51)
EOSINOPHIL # BLD AUTO: 0.31 K/UL (ref 0–0.51)
EOSINOPHIL # BLD AUTO: 0.36 K/UL (ref 0–0.51)
EOSINOPHIL # BLD AUTO: 0.39 K/UL (ref 0–0.51)
EOSINOPHIL # BLD AUTO: 0.42 K/UL (ref 0–0.51)
EOSINOPHIL NFR BLD: 0 % (ref 0–6.9)
EOSINOPHIL NFR BLD: 0.1 % (ref 0–6.9)
EOSINOPHIL NFR BLD: 0.2 % (ref 0–6.9)
EOSINOPHIL NFR BLD: 0.3 % (ref 0–6.9)
EOSINOPHIL NFR BLD: 0.4 % (ref 0–6.9)
EOSINOPHIL NFR BLD: 0.4 % (ref 0–6.9)
EOSINOPHIL NFR BLD: 0.6 % (ref 0–6.9)
EOSINOPHIL NFR BLD: 0.9 % (ref 0–6.9)
EOSINOPHIL NFR BLD: 0.9 % (ref 0–6.9)
EOSINOPHIL NFR BLD: 1 % (ref 0–6.9)
EOSINOPHIL NFR BLD: 1 % (ref 0–6.9)
EOSINOPHIL NFR BLD: 1.1 % (ref 0–6.9)
EOSINOPHIL NFR BLD: 1.5 % (ref 0–6.9)
EOSINOPHIL NFR BLD: 1.8 % (ref 0–6.9)
EOSINOPHIL NFR BLD: 1.9 % (ref 0–6.9)
EOSINOPHIL NFR BLD: 2.5 % (ref 0–6.9)
EOSINOPHIL NFR BLD: 2.5 % (ref 0–6.9)
EOSINOPHIL NFR BLD: 3.1 % (ref 0–6.9)
EOSINOPHIL NFR BLD: 3.4 % (ref 0–6.9)
EOSINOPHIL NFR BLD: 3.5 % (ref 0–6.9)
EOSINOPHIL NFR BLD: 3.7 % (ref 0–6.9)
EOSINOPHIL NFR BLD: 4.5 % (ref 0–6.9)
EOSINOPHIL NFR FLD: 12 %
EOSINOPHIL NFR FLD: 2 %
EOSINOPHIL NFR FLD: 9 %
EPI CELLS #/AREA URNS HPF: ABNORMAL /HPF
EPI CELLS #/AREA URNS HPF: NEGATIVE /HPF
ERYTHROCYTE [DISTWIDTH] IN BLOOD BY AUTOMATED COUNT: 53.1 FL (ref 35.9–50)
ERYTHROCYTE [DISTWIDTH] IN BLOOD BY AUTOMATED COUNT: 53.2 FL (ref 35.9–50)
ERYTHROCYTE [DISTWIDTH] IN BLOOD BY AUTOMATED COUNT: 53.5 FL (ref 35.9–50)
ERYTHROCYTE [DISTWIDTH] IN BLOOD BY AUTOMATED COUNT: 53.6 FL (ref 35.9–50)
ERYTHROCYTE [DISTWIDTH] IN BLOOD BY AUTOMATED COUNT: 54.1 FL (ref 35.9–50)
ERYTHROCYTE [DISTWIDTH] IN BLOOD BY AUTOMATED COUNT: 54.5 FL (ref 35.9–50)
ERYTHROCYTE [DISTWIDTH] IN BLOOD BY AUTOMATED COUNT: 54.7 FL (ref 35.9–50)
ERYTHROCYTE [DISTWIDTH] IN BLOOD BY AUTOMATED COUNT: 54.9 FL (ref 35.9–50)
ERYTHROCYTE [DISTWIDTH] IN BLOOD BY AUTOMATED COUNT: 55 FL (ref 35.9–50)
ERYTHROCYTE [DISTWIDTH] IN BLOOD BY AUTOMATED COUNT: 55.1 FL (ref 35.9–50)
ERYTHROCYTE [DISTWIDTH] IN BLOOD BY AUTOMATED COUNT: 55.3 FL (ref 35.9–50)
ERYTHROCYTE [DISTWIDTH] IN BLOOD BY AUTOMATED COUNT: 55.4 FL (ref 35.9–50)
ERYTHROCYTE [DISTWIDTH] IN BLOOD BY AUTOMATED COUNT: 55.5 FL (ref 35.9–50)
ERYTHROCYTE [DISTWIDTH] IN BLOOD BY AUTOMATED COUNT: 55.5 FL (ref 35.9–50)
ERYTHROCYTE [DISTWIDTH] IN BLOOD BY AUTOMATED COUNT: 55.6 FL (ref 35.9–50)
ERYTHROCYTE [DISTWIDTH] IN BLOOD BY AUTOMATED COUNT: 55.8 FL (ref 35.9–50)
ERYTHROCYTE [DISTWIDTH] IN BLOOD BY AUTOMATED COUNT: 55.8 FL (ref 35.9–50)
ERYTHROCYTE [DISTWIDTH] IN BLOOD BY AUTOMATED COUNT: 55.9 FL (ref 35.9–50)
ERYTHROCYTE [DISTWIDTH] IN BLOOD BY AUTOMATED COUNT: 56 FL (ref 35.9–50)
ERYTHROCYTE [DISTWIDTH] IN BLOOD BY AUTOMATED COUNT: 56.2 FL (ref 35.9–50)
ERYTHROCYTE [DISTWIDTH] IN BLOOD BY AUTOMATED COUNT: 56.4 FL (ref 35.9–50)
ERYTHROCYTE [DISTWIDTH] IN BLOOD BY AUTOMATED COUNT: 56.4 FL (ref 35.9–50)
ERYTHROCYTE [DISTWIDTH] IN BLOOD BY AUTOMATED COUNT: 56.6 FL (ref 35.9–50)
ERYTHROCYTE [DISTWIDTH] IN BLOOD BY AUTOMATED COUNT: 56.7 FL (ref 35.9–50)
ERYTHROCYTE [DISTWIDTH] IN BLOOD BY AUTOMATED COUNT: 56.9 FL (ref 35.9–50)
ERYTHROCYTE [DISTWIDTH] IN BLOOD BY AUTOMATED COUNT: 57 FL (ref 35.9–50)
ERYTHROCYTE [DISTWIDTH] IN BLOOD BY AUTOMATED COUNT: 57.1 FL (ref 35.9–50)
ERYTHROCYTE [DISTWIDTH] IN BLOOD BY AUTOMATED COUNT: 57.3 FL (ref 35.9–50)
ERYTHROCYTE [DISTWIDTH] IN BLOOD BY AUTOMATED COUNT: 57.4 FL (ref 35.9–50)
ERYTHROCYTE [DISTWIDTH] IN BLOOD BY AUTOMATED COUNT: 57.7 FL (ref 35.9–50)
ERYTHROCYTE [DISTWIDTH] IN BLOOD BY AUTOMATED COUNT: 57.7 FL (ref 35.9–50)
ERYTHROCYTE [DISTWIDTH] IN BLOOD BY AUTOMATED COUNT: 57.8 FL (ref 35.9–50)
ERYTHROCYTE [DISTWIDTH] IN BLOOD BY AUTOMATED COUNT: 58.1 FL (ref 35.9–50)
ERYTHROCYTE [DISTWIDTH] IN BLOOD BY AUTOMATED COUNT: 58.2 FL (ref 35.9–50)
ERYTHROCYTE [DISTWIDTH] IN BLOOD BY AUTOMATED COUNT: 58.2 FL (ref 35.9–50)
ERYTHROCYTE [DISTWIDTH] IN BLOOD BY AUTOMATED COUNT: 58.3 FL (ref 35.9–50)
ERYTHROCYTE [DISTWIDTH] IN BLOOD BY AUTOMATED COUNT: 58.5 FL (ref 35.9–50)
ERYTHROCYTE [DISTWIDTH] IN BLOOD BY AUTOMATED COUNT: 58.5 FL (ref 35.9–50)
ERYTHROCYTE [DISTWIDTH] IN BLOOD BY AUTOMATED COUNT: 58.7 FL (ref 35.9–50)
ERYTHROCYTE [DISTWIDTH] IN BLOOD BY AUTOMATED COUNT: 58.8 FL (ref 35.9–50)
ERYTHROCYTE [DISTWIDTH] IN BLOOD BY AUTOMATED COUNT: 58.8 FL (ref 35.9–50)
ERYTHROCYTE [DISTWIDTH] IN BLOOD BY AUTOMATED COUNT: 59 FL (ref 35.9–50)
ERYTHROCYTE [DISTWIDTH] IN BLOOD BY AUTOMATED COUNT: 59 FL (ref 35.9–50)
ERYTHROCYTE [DISTWIDTH] IN BLOOD BY AUTOMATED COUNT: 59.1 FL (ref 35.9–50)
ERYTHROCYTE [DISTWIDTH] IN BLOOD BY AUTOMATED COUNT: 59.2 FL (ref 35.9–50)
ERYTHROCYTE [DISTWIDTH] IN BLOOD BY AUTOMATED COUNT: 59.5 FL (ref 35.9–50)
ERYTHROCYTE [DISTWIDTH] IN BLOOD BY AUTOMATED COUNT: 59.9 FL (ref 35.9–50)
ERYTHROCYTE [DISTWIDTH] IN BLOOD BY AUTOMATED COUNT: 60 FL (ref 35.9–50)
ERYTHROCYTE [DISTWIDTH] IN BLOOD BY AUTOMATED COUNT: 60.2 FL (ref 35.9–50)
ERYTHROCYTE [DISTWIDTH] IN BLOOD BY AUTOMATED COUNT: 60.4 FL (ref 35.9–50)
ERYTHROCYTE [DISTWIDTH] IN BLOOD BY AUTOMATED COUNT: 60.6 FL (ref 35.9–50)
ERYTHROCYTE [DISTWIDTH] IN BLOOD BY AUTOMATED COUNT: 60.8 FL (ref 35.9–50)
ERYTHROCYTE [DISTWIDTH] IN BLOOD BY AUTOMATED COUNT: 61.1 FL (ref 35.9–50)
ERYTHROCYTE [DISTWIDTH] IN BLOOD BY AUTOMATED COUNT: 61.2 FL (ref 35.9–50)
ERYTHROCYTE [DISTWIDTH] IN BLOOD BY AUTOMATED COUNT: 61.2 FL (ref 35.9–50)
ERYTHROCYTE [DISTWIDTH] IN BLOOD BY AUTOMATED COUNT: 61.6 FL (ref 35.9–50)
ERYTHROCYTE [DISTWIDTH] IN BLOOD BY AUTOMATED COUNT: 62 FL (ref 35.9–50)
ERYTHROCYTE [DISTWIDTH] IN BLOOD BY AUTOMATED COUNT: 62.4 FL (ref 35.9–50)
ERYTHROCYTE [DISTWIDTH] IN BLOOD BY AUTOMATED COUNT: 62.4 FL (ref 35.9–50)
ERYTHROCYTE [DISTWIDTH] IN BLOOD BY AUTOMATED COUNT: 62.7 FL (ref 35.9–50)
ERYTHROCYTE [DISTWIDTH] IN BLOOD BY AUTOMATED COUNT: 62.9 FL (ref 35.9–50)
ERYTHROCYTE [DISTWIDTH] IN BLOOD BY AUTOMATED COUNT: 63.2 FL (ref 35.9–50)
ERYTHROCYTE [DISTWIDTH] IN BLOOD BY AUTOMATED COUNT: 63.5 FL (ref 35.9–50)
ERYTHROCYTE [DISTWIDTH] IN BLOOD BY AUTOMATED COUNT: 63.7 FL (ref 35.9–50)
ERYTHROCYTE [DISTWIDTH] IN BLOOD BY AUTOMATED COUNT: 63.7 FL (ref 35.9–50)
ERYTHROCYTE [DISTWIDTH] IN BLOOD BY AUTOMATED COUNT: 63.8 FL (ref 35.9–50)
ERYTHROCYTE [DISTWIDTH] IN BLOOD BY AUTOMATED COUNT: 63.8 FL (ref 35.9–50)
ERYTHROCYTE [DISTWIDTH] IN BLOOD BY AUTOMATED COUNT: 65.2 FL (ref 35.9–50)
ERYTHROCYTE [DISTWIDTH] IN BLOOD BY AUTOMATED COUNT: 66.2 FL (ref 35.9–50)
ERYTHROCYTE [DISTWIDTH] IN BLOOD BY AUTOMATED COUNT: 66.3 FL (ref 35.9–50)
ERYTHROCYTE [DISTWIDTH] IN BLOOD BY AUTOMATED COUNT: 67 FL (ref 35.9–50)
ERYTHROCYTE [DISTWIDTH] IN BLOOD BY AUTOMATED COUNT: 67.7 FL (ref 35.9–50)
ERYTHROCYTE [DISTWIDTH] IN BLOOD BY AUTOMATED COUNT: 67.9 FL (ref 35.9–50)
ERYTHROCYTE [DISTWIDTH] IN BLOOD BY AUTOMATED COUNT: 68.3 FL (ref 35.9–50)
ERYTHROCYTE [DISTWIDTH] IN BLOOD BY AUTOMATED COUNT: 69 FL (ref 35.9–50)
ERYTHROCYTE [DISTWIDTH] IN BLOOD BY AUTOMATED COUNT: 69.5 FL (ref 35.9–50)
ERYTHROCYTE [DISTWIDTH] IN BLOOD BY AUTOMATED COUNT: 71.6 FL (ref 35.9–50)
EST. AVERAGE GLUCOSE BLD GHB EST-MCNC: 103 MG/DL
EST. AVERAGE GLUCOSE BLD GHB EST-MCNC: 117 MG/DL
FERRITIN SERPL-MCNC: 1005 NG/ML (ref 22–322)
FERRITIN SERPL-MCNC: 1370 NG/ML (ref 22–322)
FERRITIN SERPL-MCNC: 1425 NG/ML (ref 22–322)
FERRITIN SERPL-MCNC: 1551 NG/ML (ref 22–322)
FERRITIN SERPL-MCNC: 1913 NG/ML (ref 22–322)
FLUAV RNA SPEC QL NAA+PROBE: NEGATIVE
FLUBV RNA SPEC QL NAA+PROBE: NEGATIVE
FOLATE SERPL-MCNC: 10 NG/ML
FOLATE SERPL-MCNC: 10.4 NG/ML
FOLATE SERPL-MCNC: 8.5 NG/ML
FUNGUS SPEC CULT: NORMAL
FUNGUS SPEC CULT: NORMAL
GLOBULIN SER CALC-MCNC: 2.6 G/DL (ref 1.9–3.5)
GLOBULIN SER CALC-MCNC: 2.6 G/DL (ref 1.9–3.5)
GLOBULIN SER CALC-MCNC: 2.7 G/DL (ref 1.9–3.5)
GLOBULIN SER CALC-MCNC: 2.7 G/DL (ref 1.9–3.5)
GLOBULIN SER CALC-MCNC: 2.8 G/DL (ref 1.9–3.5)
GLOBULIN SER CALC-MCNC: 2.9 G/DL (ref 1.9–3.5)
GLOBULIN SER CALC-MCNC: 3 G/DL (ref 1.9–3.5)
GLOBULIN SER CALC-MCNC: 3.1 G/DL (ref 1.9–3.5)
GLOBULIN SER CALC-MCNC: 3.2 G/DL (ref 1.9–3.5)
GLOBULIN SER CALC-MCNC: 3.2 G/DL (ref 1.9–3.5)
GLOBULIN SER CALC-MCNC: 3.3 G/DL (ref 1.9–3.5)
GLOBULIN SER CALC-MCNC: 3.3 G/DL (ref 1.9–3.5)
GLOBULIN SER CALC-MCNC: 3.4 G/DL (ref 1.9–3.5)
GLOBULIN SER CALC-MCNC: 3.5 G/DL (ref 1.9–3.5)
GLOBULIN SER CALC-MCNC: 3.5 G/DL (ref 1.9–3.5)
GLUCOSE BLD-MCNC: 100 MG/DL (ref 65–99)
GLUCOSE BLD-MCNC: 101 MG/DL (ref 65–99)
GLUCOSE BLD-MCNC: 102 MG/DL (ref 65–99)
GLUCOSE BLD-MCNC: 103 MG/DL (ref 65–99)
GLUCOSE BLD-MCNC: 104 MG/DL (ref 65–99)
GLUCOSE BLD-MCNC: 105 MG/DL (ref 65–99)
GLUCOSE BLD-MCNC: 106 MG/DL (ref 65–99)
GLUCOSE BLD-MCNC: 107 MG/DL (ref 65–99)
GLUCOSE BLD-MCNC: 108 MG/DL (ref 65–99)
GLUCOSE BLD-MCNC: 109 MG/DL (ref 65–99)
GLUCOSE BLD-MCNC: 110 MG/DL (ref 65–99)
GLUCOSE BLD-MCNC: 111 MG/DL (ref 65–99)
GLUCOSE BLD-MCNC: 112 MG/DL (ref 65–99)
GLUCOSE BLD-MCNC: 113 MG/DL (ref 65–99)
GLUCOSE BLD-MCNC: 114 MG/DL (ref 65–99)
GLUCOSE BLD-MCNC: 115 MG/DL (ref 65–99)
GLUCOSE BLD-MCNC: 116 MG/DL (ref 65–99)
GLUCOSE BLD-MCNC: 116 MG/DL (ref 65–99)
GLUCOSE BLD-MCNC: 117 MG/DL (ref 65–99)
GLUCOSE BLD-MCNC: 119 MG/DL (ref 65–99)
GLUCOSE BLD-MCNC: 120 MG/DL (ref 65–99)
GLUCOSE BLD-MCNC: 121 MG/DL (ref 65–99)
GLUCOSE BLD-MCNC: 122 MG/DL (ref 65–99)
GLUCOSE BLD-MCNC: 123 MG/DL (ref 65–99)
GLUCOSE BLD-MCNC: 124 MG/DL (ref 65–99)
GLUCOSE BLD-MCNC: 125 MG/DL (ref 65–99)
GLUCOSE BLD-MCNC: 125 MG/DL (ref 65–99)
GLUCOSE BLD-MCNC: 126 MG/DL (ref 65–99)
GLUCOSE BLD-MCNC: 127 MG/DL (ref 65–99)
GLUCOSE BLD-MCNC: 128 MG/DL (ref 65–99)
GLUCOSE BLD-MCNC: 129 MG/DL (ref 65–99)
GLUCOSE BLD-MCNC: 130 MG/DL (ref 65–99)
GLUCOSE BLD-MCNC: 130 MG/DL (ref 65–99)
GLUCOSE BLD-MCNC: 131 MG/DL (ref 65–99)
GLUCOSE BLD-MCNC: 132 MG/DL (ref 65–99)
GLUCOSE BLD-MCNC: 133 MG/DL (ref 65–99)
GLUCOSE BLD-MCNC: 134 MG/DL (ref 65–99)
GLUCOSE BLD-MCNC: 134 MG/DL (ref 65–99)
GLUCOSE BLD-MCNC: 135 MG/DL (ref 65–99)
GLUCOSE BLD-MCNC: 137 MG/DL (ref 65–99)
GLUCOSE BLD-MCNC: 138 MG/DL (ref 65–99)
GLUCOSE BLD-MCNC: 139 MG/DL (ref 65–99)
GLUCOSE BLD-MCNC: 140 MG/DL (ref 65–99)
GLUCOSE BLD-MCNC: 140 MG/DL (ref 65–99)
GLUCOSE BLD-MCNC: 141 MG/DL (ref 65–99)
GLUCOSE BLD-MCNC: 142 MG/DL (ref 65–99)
GLUCOSE BLD-MCNC: 143 MG/DL (ref 65–99)
GLUCOSE BLD-MCNC: 144 MG/DL (ref 65–99)
GLUCOSE BLD-MCNC: 144 MG/DL (ref 65–99)
GLUCOSE BLD-MCNC: 145 MG/DL (ref 65–99)
GLUCOSE BLD-MCNC: 146 MG/DL (ref 65–99)
GLUCOSE BLD-MCNC: 146 MG/DL (ref 65–99)
GLUCOSE BLD-MCNC: 147 MG/DL (ref 65–99)
GLUCOSE BLD-MCNC: 147 MG/DL (ref 65–99)
GLUCOSE BLD-MCNC: 148 MG/DL (ref 65–99)
GLUCOSE BLD-MCNC: 149 MG/DL (ref 65–99)
GLUCOSE BLD-MCNC: 150 MG/DL (ref 65–99)
GLUCOSE BLD-MCNC: 151 MG/DL (ref 65–99)
GLUCOSE BLD-MCNC: 152 MG/DL (ref 65–99)
GLUCOSE BLD-MCNC: 153 MG/DL (ref 65–99)
GLUCOSE BLD-MCNC: 154 MG/DL (ref 65–99)
GLUCOSE BLD-MCNC: 156 MG/DL (ref 65–99)
GLUCOSE BLD-MCNC: 157 MG/DL (ref 65–99)
GLUCOSE BLD-MCNC: 158 MG/DL (ref 65–99)
GLUCOSE BLD-MCNC: 159 MG/DL (ref 65–99)
GLUCOSE BLD-MCNC: 159 MG/DL (ref 65–99)
GLUCOSE BLD-MCNC: 160 MG/DL (ref 65–99)
GLUCOSE BLD-MCNC: 162 MG/DL (ref 65–99)
GLUCOSE BLD-MCNC: 165 MG/DL (ref 65–99)
GLUCOSE BLD-MCNC: 167 MG/DL (ref 65–99)
GLUCOSE BLD-MCNC: 167 MG/DL (ref 65–99)
GLUCOSE BLD-MCNC: 168 MG/DL (ref 65–99)
GLUCOSE BLD-MCNC: 169 MG/DL (ref 65–99)
GLUCOSE BLD-MCNC: 170 MG/DL (ref 65–99)
GLUCOSE BLD-MCNC: 172 MG/DL (ref 65–99)
GLUCOSE BLD-MCNC: 174 MG/DL (ref 65–99)
GLUCOSE BLD-MCNC: 175 MG/DL (ref 65–99)
GLUCOSE BLD-MCNC: 179 MG/DL (ref 65–99)
GLUCOSE BLD-MCNC: 179 MG/DL (ref 65–99)
GLUCOSE BLD-MCNC: 180 MG/DL (ref 65–99)
GLUCOSE BLD-MCNC: 187 MG/DL (ref 65–99)
GLUCOSE BLD-MCNC: 193 MG/DL (ref 65–99)
GLUCOSE BLD-MCNC: 193 MG/DL (ref 65–99)
GLUCOSE BLD-MCNC: 199 MG/DL (ref 65–99)
GLUCOSE BLD-MCNC: 203 MG/DL (ref 65–99)
GLUCOSE BLD-MCNC: 205 MG/DL (ref 65–99)
GLUCOSE BLD-MCNC: 207 MG/DL (ref 65–99)
GLUCOSE BLD-MCNC: 208 MG/DL (ref 65–99)
GLUCOSE BLD-MCNC: 208 MG/DL (ref 65–99)
GLUCOSE BLD-MCNC: 228 MG/DL (ref 65–99)
GLUCOSE BLD-MCNC: 29 MG/DL (ref 65–99)
GLUCOSE BLD-MCNC: 30 MG/DL (ref 65–99)
GLUCOSE BLD-MCNC: 49 MG/DL (ref 65–99)
GLUCOSE BLD-MCNC: 55 MG/DL (ref 65–99)
GLUCOSE BLD-MCNC: 61 MG/DL (ref 65–99)
GLUCOSE BLD-MCNC: 61 MG/DL (ref 65–99)
GLUCOSE BLD-MCNC: 63 MG/DL (ref 65–99)
GLUCOSE BLD-MCNC: 66 MG/DL (ref 65–99)
GLUCOSE BLD-MCNC: 68 MG/DL (ref 65–99)
GLUCOSE BLD-MCNC: 69 MG/DL (ref 65–99)
GLUCOSE BLD-MCNC: 70 MG/DL (ref 65–99)
GLUCOSE BLD-MCNC: 73 MG/DL (ref 65–99)
GLUCOSE BLD-MCNC: 74 MG/DL (ref 65–99)
GLUCOSE BLD-MCNC: 76 MG/DL (ref 65–99)
GLUCOSE BLD-MCNC: 77 MG/DL (ref 65–99)
GLUCOSE BLD-MCNC: 77 MG/DL (ref 65–99)
GLUCOSE BLD-MCNC: 78 MG/DL (ref 65–99)
GLUCOSE BLD-MCNC: 79 MG/DL (ref 65–99)
GLUCOSE BLD-MCNC: 81 MG/DL (ref 65–99)
GLUCOSE BLD-MCNC: 81 MG/DL (ref 65–99)
GLUCOSE BLD-MCNC: 82 MG/DL (ref 65–99)
GLUCOSE BLD-MCNC: 83 MG/DL (ref 65–99)
GLUCOSE BLD-MCNC: 84 MG/DL (ref 65–99)
GLUCOSE BLD-MCNC: 86 MG/DL (ref 65–99)
GLUCOSE BLD-MCNC: 86 MG/DL (ref 65–99)
GLUCOSE BLD-MCNC: 87 MG/DL (ref 65–99)
GLUCOSE BLD-MCNC: 87 MG/DL (ref 65–99)
GLUCOSE BLD-MCNC: 88 MG/DL (ref 65–99)
GLUCOSE BLD-MCNC: 89 MG/DL (ref 65–99)
GLUCOSE BLD-MCNC: 90 MG/DL (ref 65–99)
GLUCOSE BLD-MCNC: 91 MG/DL (ref 65–99)
GLUCOSE BLD-MCNC: 92 MG/DL (ref 65–99)
GLUCOSE BLD-MCNC: 93 MG/DL (ref 65–99)
GLUCOSE BLD-MCNC: 94 MG/DL (ref 65–99)
GLUCOSE BLD-MCNC: 95 MG/DL (ref 65–99)
GLUCOSE BLD-MCNC: 96 MG/DL (ref 65–99)
GLUCOSE BLD-MCNC: 97 MG/DL (ref 65–99)
GLUCOSE BLD-MCNC: 98 MG/DL (ref 65–99)
GLUCOSE BLD-MCNC: 98 MG/DL (ref 65–99)
GLUCOSE BLD-MCNC: 99 MG/DL (ref 65–99)
GLUCOSE FLD-MCNC: 107 MG/DL
GLUCOSE FLD-MCNC: 139 MG/DL
GLUCOSE FLD-MCNC: 88 MG/DL
GLUCOSE SERPL-MCNC: 100 MG/DL (ref 65–99)
GLUCOSE SERPL-MCNC: 101 MG/DL (ref 65–99)
GLUCOSE SERPL-MCNC: 101 MG/DL (ref 65–99)
GLUCOSE SERPL-MCNC: 102 MG/DL (ref 65–99)
GLUCOSE SERPL-MCNC: 103 MG/DL (ref 65–99)
GLUCOSE SERPL-MCNC: 103 MG/DL (ref 65–99)
GLUCOSE SERPL-MCNC: 104 MG/DL (ref 65–99)
GLUCOSE SERPL-MCNC: 105 MG/DL (ref 65–99)
GLUCOSE SERPL-MCNC: 105 MG/DL (ref 65–99)
GLUCOSE SERPL-MCNC: 106 MG/DL (ref 65–99)
GLUCOSE SERPL-MCNC: 107 MG/DL (ref 65–99)
GLUCOSE SERPL-MCNC: 107 MG/DL (ref 65–99)
GLUCOSE SERPL-MCNC: 108 MG/DL (ref 65–99)
GLUCOSE SERPL-MCNC: 108 MG/DL (ref 65–99)
GLUCOSE SERPL-MCNC: 111 MG/DL (ref 65–99)
GLUCOSE SERPL-MCNC: 112 MG/DL (ref 65–99)
GLUCOSE SERPL-MCNC: 115 MG/DL (ref 65–99)
GLUCOSE SERPL-MCNC: 116 MG/DL (ref 65–99)
GLUCOSE SERPL-MCNC: 117 MG/DL (ref 65–99)
GLUCOSE SERPL-MCNC: 118 MG/DL (ref 65–99)
GLUCOSE SERPL-MCNC: 119 MG/DL (ref 65–99)
GLUCOSE SERPL-MCNC: 119 MG/DL (ref 65–99)
GLUCOSE SERPL-MCNC: 120 MG/DL (ref 65–99)
GLUCOSE SERPL-MCNC: 120 MG/DL (ref 65–99)
GLUCOSE SERPL-MCNC: 121 MG/DL (ref 65–99)
GLUCOSE SERPL-MCNC: 122 MG/DL (ref 65–99)
GLUCOSE SERPL-MCNC: 124 MG/DL (ref 65–99)
GLUCOSE SERPL-MCNC: 125 MG/DL (ref 65–99)
GLUCOSE SERPL-MCNC: 126 MG/DL (ref 65–99)
GLUCOSE SERPL-MCNC: 128 MG/DL (ref 65–99)
GLUCOSE SERPL-MCNC: 129 MG/DL (ref 65–99)
GLUCOSE SERPL-MCNC: 129 MG/DL (ref 65–99)
GLUCOSE SERPL-MCNC: 131 MG/DL (ref 65–99)
GLUCOSE SERPL-MCNC: 135 MG/DL (ref 65–99)
GLUCOSE SERPL-MCNC: 136 MG/DL (ref 65–99)
GLUCOSE SERPL-MCNC: 140 MG/DL (ref 65–99)
GLUCOSE SERPL-MCNC: 142 MG/DL (ref 65–99)
GLUCOSE SERPL-MCNC: 147 MG/DL (ref 65–99)
GLUCOSE SERPL-MCNC: 149 MG/DL (ref 65–99)
GLUCOSE SERPL-MCNC: 150 MG/DL (ref 65–99)
GLUCOSE SERPL-MCNC: 163 MG/DL (ref 65–99)
GLUCOSE SERPL-MCNC: 165 MG/DL (ref 65–99)
GLUCOSE SERPL-MCNC: 167 MG/DL (ref 65–99)
GLUCOSE SERPL-MCNC: 171 MG/DL (ref 65–99)
GLUCOSE SERPL-MCNC: 171 MG/DL (ref 65–99)
GLUCOSE SERPL-MCNC: 225 MG/DL (ref 65–99)
GLUCOSE SERPL-MCNC: 243 MG/DL (ref 65–99)
GLUCOSE SERPL-MCNC: 67 MG/DL (ref 65–99)
GLUCOSE SERPL-MCNC: 70 MG/DL (ref 65–99)
GLUCOSE SERPL-MCNC: 73 MG/DL (ref 65–99)
GLUCOSE SERPL-MCNC: 80 MG/DL (ref 65–99)
GLUCOSE SERPL-MCNC: 86 MG/DL (ref 65–99)
GLUCOSE SERPL-MCNC: 88 MG/DL (ref 65–99)
GLUCOSE SERPL-MCNC: 88 MG/DL (ref 65–99)
GLUCOSE SERPL-MCNC: 92 MG/DL (ref 65–99)
GLUCOSE SERPL-MCNC: 93 MG/DL (ref 65–99)
GLUCOSE SERPL-MCNC: 94 MG/DL (ref 65–99)
GLUCOSE SERPL-MCNC: 95 MG/DL (ref 65–99)
GLUCOSE SERPL-MCNC: 96 MG/DL (ref 65–99)
GLUCOSE SERPL-MCNC: 97 MG/DL (ref 65–99)
GLUCOSE SERPL-MCNC: 97 MG/DL (ref 65–99)
GLUCOSE UR STRIP.AUTO-MCNC: 100 MG/DL
GLUCOSE UR STRIP.AUTO-MCNC: 100 MG/DL
GLUCOSE UR STRIP.AUTO-MCNC: 250 MG/DL
GLUCOSE UR STRIP.AUTO-MCNC: 250 MG/DL
GLUCOSE UR STRIP.AUTO-MCNC: 500 MG/DL
GRAM STN SPEC: ABNORMAL
GRAM STN SPEC: ABNORMAL
GRAM STN SPEC: NORMAL
HAPTOGLOB SERPL-MCNC: 315 MG/DL (ref 30–200)
HAV IGM SERPL QL IA: NORMAL
HBA1C MFR BLD: 5.2 % (ref 0–5.6)
HBA1C MFR BLD: 5.7 % (ref 4–5.6)
HBV CORE IGM SER QL: NORMAL
HBV SURFACE AB SERPL IA-ACNC: 174 MIU/ML (ref 0–10)
HBV SURFACE AG SER QL: NORMAL
HCO3 BLDA-SCNC: 27 MMOL/L (ref 17–25)
HCT VFR BLD AUTO: 19.3 % (ref 42–52)
HCT VFR BLD AUTO: 20.3 % (ref 42–52)
HCT VFR BLD AUTO: 20.6 % (ref 42–52)
HCT VFR BLD AUTO: 20.7 % (ref 42–52)
HCT VFR BLD AUTO: 20.7 % (ref 42–52)
HCT VFR BLD AUTO: 20.9 % (ref 42–52)
HCT VFR BLD AUTO: 21.5 % (ref 42–52)
HCT VFR BLD AUTO: 22.1 % (ref 42–52)
HCT VFR BLD AUTO: 22.2 % (ref 42–52)
HCT VFR BLD AUTO: 22.9 % (ref 42–52)
HCT VFR BLD AUTO: 23 % (ref 42–52)
HCT VFR BLD AUTO: 23.1 % (ref 42–52)
HCT VFR BLD AUTO: 23.2 % (ref 42–52)
HCT VFR BLD AUTO: 23.2 % (ref 42–52)
HCT VFR BLD AUTO: 23.3 % (ref 42–52)
HCT VFR BLD AUTO: 23.4 % (ref 42–52)
HCT VFR BLD AUTO: 23.5 % (ref 42–52)
HCT VFR BLD AUTO: 23.6 % (ref 42–52)
HCT VFR BLD AUTO: 23.7 % (ref 42–52)
HCT VFR BLD AUTO: 23.9 % (ref 42–52)
HCT VFR BLD AUTO: 24.4 % (ref 42–52)
HCT VFR BLD AUTO: 24.6 % (ref 42–52)
HCT VFR BLD AUTO: 24.7 % (ref 42–52)
HCT VFR BLD AUTO: 24.7 % (ref 42–52)
HCT VFR BLD AUTO: 24.8 % (ref 42–52)
HCT VFR BLD AUTO: 24.9 % (ref 42–52)
HCT VFR BLD AUTO: 25 % (ref 42–52)
HCT VFR BLD AUTO: 25 % (ref 42–52)
HCT VFR BLD AUTO: 25.1 % (ref 42–52)
HCT VFR BLD AUTO: 25.2 % (ref 42–52)
HCT VFR BLD AUTO: 25.2 % (ref 42–52)
HCT VFR BLD AUTO: 25.6 % (ref 42–52)
HCT VFR BLD AUTO: 25.8 % (ref 42–52)
HCT VFR BLD AUTO: 25.8 % (ref 42–52)
HCT VFR BLD AUTO: 26 % (ref 42–52)
HCT VFR BLD AUTO: 26.2 % (ref 42–52)
HCT VFR BLD AUTO: 26.2 % (ref 42–52)
HCT VFR BLD AUTO: 26.3 % (ref 42–52)
HCT VFR BLD AUTO: 26.4 % (ref 42–52)
HCT VFR BLD AUTO: 26.4 % (ref 42–52)
HCT VFR BLD AUTO: 26.6 % (ref 42–52)
HCT VFR BLD AUTO: 26.6 % (ref 42–52)
HCT VFR BLD AUTO: 26.7 % (ref 42–52)
HCT VFR BLD AUTO: 27 % (ref 42–52)
HCT VFR BLD AUTO: 27 % (ref 42–52)
HCT VFR BLD AUTO: 27.1 % (ref 42–52)
HCT VFR BLD AUTO: 27.1 % (ref 42–52)
HCT VFR BLD AUTO: 27.2 % (ref 42–52)
HCT VFR BLD AUTO: 27.3 % (ref 42–52)
HCT VFR BLD AUTO: 27.5 % (ref 42–52)
HCT VFR BLD AUTO: 27.6 % (ref 42–52)
HCT VFR BLD AUTO: 27.9 % (ref 42–52)
HCT VFR BLD AUTO: 27.9 % (ref 42–52)
HCT VFR BLD AUTO: 28 % (ref 42–52)
HCT VFR BLD AUTO: 28.2 % (ref 42–52)
HCT VFR BLD AUTO: 28.2 % (ref 42–52)
HCT VFR BLD AUTO: 28.3 % (ref 42–52)
HCT VFR BLD AUTO: 28.3 % (ref 42–52)
HCT VFR BLD AUTO: 28.4 % (ref 42–52)
HCT VFR BLD AUTO: 28.5 % (ref 42–52)
HCT VFR BLD AUTO: 28.5 % (ref 42–52)
HCT VFR BLD AUTO: 28.7 % (ref 42–52)
HCT VFR BLD AUTO: 29.1 % (ref 42–52)
HCT VFR BLD AUTO: 29.1 % (ref 42–52)
HCT VFR BLD AUTO: 29.3 % (ref 42–52)
HCT VFR BLD AUTO: 29.4 % (ref 42–52)
HCT VFR BLD AUTO: 30.4 % (ref 42–52)
HCT VFR BLD AUTO: 30.6 % (ref 42–52)
HCT VFR BLD AUTO: 31 % (ref 42–52)
HCT VFR BLD AUTO: 32.7 % (ref 42–52)
HCV AB SER QL: NORMAL
HDLC SERPL-MCNC: 25 MG/DL
HEMOCCULT SP1 STL QL: POSITIVE
HEMOCCULT SP3 STL QL: POSITIVE
HEMOCCULT STL QL: NEGATIVE
HGB BLD-MCNC: 6.5 G/DL (ref 14–18)
HGB BLD-MCNC: 6.6 G/DL (ref 14–18)
HGB BLD-MCNC: 7 G/DL (ref 14–18)
HGB BLD-MCNC: 7 G/DL (ref 14–18)
HGB BLD-MCNC: 7.1 G/DL (ref 14–18)
HGB BLD-MCNC: 7.1 G/DL (ref 14–18)
HGB BLD-MCNC: 7.3 G/DL (ref 14–18)
HGB BLD-MCNC: 7.5 G/DL (ref 14–18)
HGB BLD-MCNC: 7.6 G/DL (ref 14–18)
HGB BLD-MCNC: 7.7 G/DL (ref 14–18)
HGB BLD-MCNC: 7.8 G/DL (ref 14–18)
HGB BLD-MCNC: 7.9 G/DL (ref 14–18)
HGB BLD-MCNC: 8 G/DL (ref 14–18)
HGB BLD-MCNC: 8.1 G/DL (ref 14–18)
HGB BLD-MCNC: 8.2 G/DL (ref 14–18)
HGB BLD-MCNC: 8.3 G/DL (ref 14–18)
HGB BLD-MCNC: 8.4 G/DL (ref 14–18)
HGB BLD-MCNC: 8.5 G/DL (ref 14–18)
HGB BLD-MCNC: 8.6 G/DL (ref 14–18)
HGB BLD-MCNC: 8.7 G/DL (ref 14–18)
HGB BLD-MCNC: 8.8 G/DL (ref 14–18)
HGB BLD-MCNC: 8.9 G/DL (ref 14–18)
HGB BLD-MCNC: 8.9 G/DL (ref 14–18)
HGB BLD-MCNC: 9.1 G/DL (ref 14–18)
HGB BLD-MCNC: 9.2 G/DL (ref 14–18)
HGB BLD-MCNC: 9.3 G/DL (ref 14–18)
HGB BLD-MCNC: 9.3 G/DL (ref 14–18)
HGB BLD-MCNC: 9.5 G/DL (ref 14–18)
HGB BLD-MCNC: 9.5 G/DL (ref 14–18)
HGB BLD-MCNC: 9.6 G/DL (ref 14–18)
HGB BLD-MCNC: 9.6 G/DL (ref 14–18)
HGB BLD-MCNC: 9.7 G/DL (ref 14–18)
HGB BLD-MCNC: 9.9 G/DL (ref 14–18)
HGB RETIC QN AUTO: 23.5 PG/CELL (ref 29–35)
HGB RETIC QN AUTO: 27.7 PG/CELL (ref 29–35)
HGB RETIC QN AUTO: 31.2 PG/CELL (ref 29–35)
HISTIOCYTES NFR FLD: 18 %
HISTIOCYTES NFR FLD: 32 %
HISTIOCYTES NFR FLD: 82 %
HYALINE CASTS #/AREA URNS LPF: ABNORMAL /LPF
IMM GRANULOCYTES # BLD AUTO: 0.02 K/UL (ref 0–0.11)
IMM GRANULOCYTES # BLD AUTO: 0.03 K/UL (ref 0–0.11)
IMM GRANULOCYTES # BLD AUTO: 0.04 K/UL (ref 0–0.11)
IMM GRANULOCYTES # BLD AUTO: 0.05 K/UL (ref 0–0.11)
IMM GRANULOCYTES # BLD AUTO: 0.06 K/UL (ref 0–0.11)
IMM GRANULOCYTES # BLD AUTO: 0.07 K/UL (ref 0–0.11)
IMM GRANULOCYTES # BLD AUTO: 0.08 K/UL (ref 0–0.11)
IMM GRANULOCYTES # BLD AUTO: 0.09 K/UL (ref 0–0.11)
IMM GRANULOCYTES # BLD AUTO: 0.1 K/UL (ref 0–0.11)
IMM GRANULOCYTES # BLD AUTO: 0.11 K/UL (ref 0–0.11)
IMM GRANULOCYTES # BLD AUTO: 0.11 K/UL (ref 0–0.11)
IMM GRANULOCYTES # BLD AUTO: 0.12 K/UL (ref 0–0.11)
IMM GRANULOCYTES # BLD AUTO: 0.13 K/UL (ref 0–0.11)
IMM GRANULOCYTES # BLD AUTO: 0.13 K/UL (ref 0–0.11)
IMM GRANULOCYTES # BLD AUTO: 0.14 K/UL (ref 0–0.11)
IMM GRANULOCYTES # BLD AUTO: 0.16 K/UL (ref 0–0.11)
IMM GRANULOCYTES # BLD AUTO: 0.18 K/UL (ref 0–0.11)
IMM GRANULOCYTES # BLD AUTO: 0.19 K/UL (ref 0–0.11)
IMM GRANULOCYTES # BLD AUTO: 0.19 K/UL (ref 0–0.11)
IMM GRANULOCYTES # BLD AUTO: 0.2 K/UL (ref 0–0.11)
IMM GRANULOCYTES # BLD AUTO: 0.21 K/UL (ref 0–0.11)
IMM GRANULOCYTES # BLD AUTO: 0.27 K/UL (ref 0–0.11)
IMM GRANULOCYTES # BLD AUTO: 0.3 K/UL (ref 0–0.11)
IMM GRANULOCYTES # BLD AUTO: 0.41 K/UL (ref 0–0.11)
IMM GRANULOCYTES # BLD AUTO: 0.44 K/UL (ref 0–0.11)
IMM GRANULOCYTES # BLD AUTO: 0.49 K/UL (ref 0–0.11)
IMM GRANULOCYTES NFR BLD AUTO: 0.4 % (ref 0–0.9)
IMM GRANULOCYTES NFR BLD AUTO: 0.5 % (ref 0–0.9)
IMM GRANULOCYTES NFR BLD AUTO: 0.6 % (ref 0–0.9)
IMM GRANULOCYTES NFR BLD AUTO: 0.6 % (ref 0–0.9)
IMM GRANULOCYTES NFR BLD AUTO: 0.7 % (ref 0–0.9)
IMM GRANULOCYTES NFR BLD AUTO: 0.8 % (ref 0–0.9)
IMM GRANULOCYTES NFR BLD AUTO: 0.9 % (ref 0–0.9)
IMM GRANULOCYTES NFR BLD AUTO: 1 % (ref 0–0.9)
IMM GRANULOCYTES NFR BLD AUTO: 1.1 % (ref 0–0.9)
IMM GRANULOCYTES NFR BLD AUTO: 1.2 % (ref 0–0.9)
IMM GRANULOCYTES NFR BLD AUTO: 1.2 % (ref 0–0.9)
IMM GRANULOCYTES NFR BLD AUTO: 1.3 % (ref 0–0.9)
IMM GRANULOCYTES NFR BLD AUTO: 1.5 % (ref 0–0.9)
IMM GRANULOCYTES NFR BLD AUTO: 1.6 % (ref 0–0.9)
IMM GRANULOCYTES NFR BLD AUTO: 1.6 % (ref 0–0.9)
IMM GRANULOCYTES NFR BLD AUTO: 1.7 % (ref 0–0.9)
IMM GRANULOCYTES NFR BLD AUTO: 1.8 % (ref 0–0.9)
IMM GRANULOCYTES NFR BLD AUTO: 1.9 % (ref 0–0.9)
IMM GRANULOCYTES NFR BLD AUTO: 2.5 % (ref 0–0.9)
IMM GRANULOCYTES NFR BLD AUTO: 4.1 % (ref 0–0.9)
IMM GRANULOCYTES NFR BLD AUTO: 4.2 % (ref 0–0.9)
IMM GRANULOCYTES NFR BLD AUTO: 4.4 % (ref 0–0.9)
IMM RETICS NFR: 21 % (ref 9.3–17.4)
IMM RETICS NFR: 22.8 % (ref 9.3–17.4)
IMM RETICS NFR: 27.6 % (ref 9.3–17.4)
INR PPP: 1.28 (ref 0.87–1.13)
INR PPP: 1.64 (ref 0.87–1.13)
INR PPP: 2.21 (ref 0.87–1.13)
IRON SATN MFR SERPL: 20 % (ref 15–55)
IRON SATN MFR SERPL: 35 % (ref 15–55)
IRON SATN MFR SERPL: 42 % (ref 15–55)
IRON SATN MFR SERPL: 46 % (ref 15–55)
IRON SERPL-MCNC: 37 UG/DL (ref 50–180)
IRON SERPL-MCNC: 59 UG/DL (ref 50–180)
IRON SERPL-MCNC: 65 UG/DL (ref 50–180)
IRON SERPL-MCNC: 73 UG/DL (ref 50–180)
KETONES UR STRIP.AUTO-MCNC: 40 MG/DL
KETONES UR STRIP.AUTO-MCNC: NEGATIVE MG/DL
LACTATE BLD-SCNC: 0.8 MMOL/L (ref 0.5–2)
LACTATE BLD-SCNC: 1 MMOL/L (ref 0.5–2)
LACTATE BLD-SCNC: 1 MMOL/L (ref 0.5–2)
LACTATE BLD-SCNC: 1.1 MMOL/L (ref 0.5–2)
LACTATE BLD-SCNC: 1.1 MMOL/L (ref 0.5–2)
LACTATE BLD-SCNC: 1.2 MMOL/L (ref 0.5–2)
LACTATE BLD-SCNC: 1.2 MMOL/L (ref 0.5–2)
LACTATE BLD-SCNC: 1.3 MMOL/L (ref 0.5–2)
LACTATE BLD-SCNC: 1.3 MMOL/L (ref 0.5–2)
LACTATE BLD-SCNC: 1.4 MMOL/L (ref 0.5–2)
LACTATE BLD-SCNC: 1.5 MMOL/L (ref 0.5–2)
LACTATE BLD-SCNC: 1.7 MMOL/L (ref 0.5–2)
LACTATE BLD-SCNC: 1.8 MMOL/L (ref 0.5–2)
LACTATE BLD-SCNC: 1.9 MMOL/L (ref 0.5–2)
LACTATE BLD-SCNC: 2.4 MMOL/L (ref 0.5–2)
LDH FLD L TO P-CCNC: 109 U/L
LDH FLD L TO P-CCNC: 90 U/L
LDH FLD L TO P-CCNC: 94 U/L
LDH SERPL L TO P-CCNC: 188 U/L (ref 107–266)
LDH SERPL L TO P-CCNC: 211 U/L (ref 107–266)
LDLC SERPL CALC-MCNC: 102 MG/DL
LEUKOCYTE ESTERASE UR QL STRIP.AUTO: ABNORMAL
LEUKOCYTE ESTERASE UR QL STRIP.AUTO: NEGATIVE
LEUKOCYTE ESTERASE UR QL STRIP.AUTO: NEGATIVE
LV EJECT FRACT  99904: 30
LV EJECT FRACT MOD 2C 99903: 34.85
LV EJECT FRACT MOD 4C 99902: 36.04
LV EJECT FRACT MOD BP 99901: 35.63
LYMPHOCYTES # BLD AUTO: 0.66 K/UL (ref 1–4.8)
LYMPHOCYTES # BLD AUTO: 0.79 K/UL (ref 1–4.8)
LYMPHOCYTES # BLD AUTO: 0.82 K/UL (ref 1–4.8)
LYMPHOCYTES # BLD AUTO: 0.91 K/UL (ref 1–4.8)
LYMPHOCYTES # BLD AUTO: 0.92 K/UL (ref 1–4.8)
LYMPHOCYTES # BLD AUTO: 0.92 K/UL (ref 1–4.8)
LYMPHOCYTES # BLD AUTO: 0.94 K/UL (ref 1–4.8)
LYMPHOCYTES # BLD AUTO: 0.95 K/UL (ref 1–4.8)
LYMPHOCYTES # BLD AUTO: 0.95 K/UL (ref 1–4.8)
LYMPHOCYTES # BLD AUTO: 0.96 K/UL (ref 1–4.8)
LYMPHOCYTES # BLD AUTO: 0.97 K/UL (ref 1–4.8)
LYMPHOCYTES # BLD AUTO: 0.98 K/UL (ref 1–4.8)
LYMPHOCYTES # BLD AUTO: 0.99 K/UL (ref 1–4.8)
LYMPHOCYTES # BLD AUTO: 1.01 K/UL (ref 1–4.8)
LYMPHOCYTES # BLD AUTO: 1.01 K/UL (ref 1–4.8)
LYMPHOCYTES # BLD AUTO: 1.03 K/UL (ref 1–4.8)
LYMPHOCYTES # BLD AUTO: 1.07 K/UL (ref 1–4.8)
LYMPHOCYTES # BLD AUTO: 1.09 K/UL (ref 1–4.8)
LYMPHOCYTES # BLD AUTO: 1.09 K/UL (ref 1–4.8)
LYMPHOCYTES # BLD AUTO: 1.11 K/UL (ref 1–4.8)
LYMPHOCYTES # BLD AUTO: 1.11 K/UL (ref 1–4.8)
LYMPHOCYTES # BLD AUTO: 1.12 K/UL (ref 1–4.8)
LYMPHOCYTES # BLD AUTO: 1.13 K/UL (ref 1–4.8)
LYMPHOCYTES # BLD AUTO: 1.14 K/UL (ref 1–4.8)
LYMPHOCYTES # BLD AUTO: 1.17 K/UL (ref 1–4.8)
LYMPHOCYTES # BLD AUTO: 1.19 K/UL (ref 1–4.8)
LYMPHOCYTES # BLD AUTO: 1.2 K/UL (ref 1–4.8)
LYMPHOCYTES # BLD AUTO: 1.2 K/UL (ref 1–4.8)
LYMPHOCYTES # BLD AUTO: 1.23 K/UL (ref 1–4.8)
LYMPHOCYTES # BLD AUTO: 1.29 K/UL (ref 1–4.8)
LYMPHOCYTES # BLD AUTO: 1.3 K/UL (ref 1–4.8)
LYMPHOCYTES # BLD AUTO: 1.3 K/UL (ref 1–4.8)
LYMPHOCYTES # BLD AUTO: 1.33 K/UL (ref 1–4.8)
LYMPHOCYTES # BLD AUTO: 1.38 K/UL (ref 1–4.8)
LYMPHOCYTES # BLD AUTO: 1.44 K/UL (ref 1–4.8)
LYMPHOCYTES # BLD AUTO: 1.47 K/UL (ref 1–4.8)
LYMPHOCYTES # BLD AUTO: 1.48 K/UL (ref 1–4.8)
LYMPHOCYTES # BLD AUTO: 1.5 K/UL (ref 1–4.8)
LYMPHOCYTES # BLD AUTO: 1.52 K/UL (ref 1–4.8)
LYMPHOCYTES # BLD AUTO: 1.54 K/UL (ref 1–4.8)
LYMPHOCYTES # BLD AUTO: 1.56 K/UL (ref 1–4.8)
LYMPHOCYTES # BLD AUTO: 1.59 K/UL (ref 1–4.8)
LYMPHOCYTES # BLD AUTO: 1.62 K/UL (ref 1–4.8)
LYMPHOCYTES # BLD AUTO: 1.63 K/UL (ref 1–4.8)
LYMPHOCYTES # BLD AUTO: 1.68 K/UL (ref 1–4.8)
LYMPHOCYTES # BLD AUTO: 1.74 K/UL (ref 1–4.8)
LYMPHOCYTES # BLD AUTO: 1.75 K/UL (ref 1–4.8)
LYMPHOCYTES # BLD AUTO: 1.83 K/UL (ref 1–4.8)
LYMPHOCYTES NFR BLD: 10.3 % (ref 22–41)
LYMPHOCYTES NFR BLD: 10.5 % (ref 22–41)
LYMPHOCYTES NFR BLD: 10.7 % (ref 22–41)
LYMPHOCYTES NFR BLD: 11.1 % (ref 22–41)
LYMPHOCYTES NFR BLD: 11.6 % (ref 22–41)
LYMPHOCYTES NFR BLD: 11.7 % (ref 22–41)
LYMPHOCYTES NFR BLD: 11.7 % (ref 22–41)
LYMPHOCYTES NFR BLD: 11.8 % (ref 22–41)
LYMPHOCYTES NFR BLD: 11.9 % (ref 22–41)
LYMPHOCYTES NFR BLD: 12.1 % (ref 22–41)
LYMPHOCYTES NFR BLD: 12.4 % (ref 22–41)
LYMPHOCYTES NFR BLD: 12.7 % (ref 22–41)
LYMPHOCYTES NFR BLD: 12.8 % (ref 22–41)
LYMPHOCYTES NFR BLD: 12.9 % (ref 22–41)
LYMPHOCYTES NFR BLD: 13.1 % (ref 22–41)
LYMPHOCYTES NFR BLD: 13.2 % (ref 22–41)
LYMPHOCYTES NFR BLD: 13.3 % (ref 22–41)
LYMPHOCYTES NFR BLD: 13.3 % (ref 22–41)
LYMPHOCYTES NFR BLD: 13.7 % (ref 22–41)
LYMPHOCYTES NFR BLD: 13.7 % (ref 22–41)
LYMPHOCYTES NFR BLD: 13.8 % (ref 22–41)
LYMPHOCYTES NFR BLD: 14 % (ref 22–41)
LYMPHOCYTES NFR BLD: 14.1 % (ref 22–41)
LYMPHOCYTES NFR BLD: 14.2 % (ref 22–41)
LYMPHOCYTES NFR BLD: 14.7 % (ref 22–41)
LYMPHOCYTES NFR BLD: 14.8 % (ref 22–41)
LYMPHOCYTES NFR BLD: 15.3 % (ref 22–41)
LYMPHOCYTES NFR BLD: 15.7 % (ref 22–41)
LYMPHOCYTES NFR BLD: 16 % (ref 22–41)
LYMPHOCYTES NFR BLD: 16.3 % (ref 22–41)
LYMPHOCYTES NFR BLD: 17.3 % (ref 22–41)
LYMPHOCYTES NFR BLD: 18.5 % (ref 22–41)
LYMPHOCYTES NFR BLD: 19.2 % (ref 22–41)
LYMPHOCYTES NFR BLD: 19.6 % (ref 22–41)
LYMPHOCYTES NFR BLD: 20 % (ref 22–41)
LYMPHOCYTES NFR BLD: 20.4 % (ref 22–41)
LYMPHOCYTES NFR BLD: 21.5 % (ref 22–41)
LYMPHOCYTES NFR BLD: 21.8 % (ref 22–41)
LYMPHOCYTES NFR BLD: 22.2 % (ref 22–41)
LYMPHOCYTES NFR BLD: 3.3 % (ref 22–41)
LYMPHOCYTES NFR BLD: 5.1 % (ref 22–41)
LYMPHOCYTES NFR BLD: 5.3 % (ref 22–41)
LYMPHOCYTES NFR BLD: 6.2 % (ref 22–41)
LYMPHOCYTES NFR BLD: 7.1 % (ref 22–41)
LYMPHOCYTES NFR BLD: 7.2 % (ref 22–41)
LYMPHOCYTES NFR BLD: 7.5 % (ref 22–41)
LYMPHOCYTES NFR BLD: 8.1 % (ref 22–41)
LYMPHOCYTES NFR BLD: 8.3 % (ref 22–41)
LYMPHOCYTES NFR BLD: 8.5 % (ref 22–41)
LYMPHOCYTES NFR BLD: 8.7 % (ref 22–41)
LYMPHOCYTES NFR BLD: 9.4 % (ref 22–41)
LYMPHOCYTES NFR BLD: 9.8 % (ref 22–41)
LYMPHOCYTES NFR FLD: 1 %
LYMPHOCYTES NFR FLD: 4 %
LYMPHOCYTES NFR FLD: 4 %
MACROCYTES BLD QL SMEAR: ABNORMAL
MACROCYTES BLD QL SMEAR: ABNORMAL
MAGNESIUM SERPL-MCNC: 1.8 MG/DL (ref 1.5–2.5)
MAGNESIUM SERPL-MCNC: 1.9 MG/DL (ref 1.5–2.5)
MAGNESIUM SERPL-MCNC: 2 MG/DL (ref 1.5–2.5)
MAGNESIUM SERPL-MCNC: 2.1 MG/DL (ref 1.5–2.5)
MAGNESIUM SERPL-MCNC: 2.2 MG/DL (ref 1.5–2.5)
MAGNESIUM SERPL-MCNC: 2.3 MG/DL (ref 1.5–2.5)
MANUAL DIFF BLD: NORMAL
MCF BLD TEG: 74.2 MM (ref 50–70)
MCH RBC QN AUTO: 29.8 PG (ref 27–33)
MCH RBC QN AUTO: 30.3 PG (ref 27–33)
MCH RBC QN AUTO: 30.8 PG (ref 27–33)
MCH RBC QN AUTO: 30.8 PG (ref 27–33)
MCH RBC QN AUTO: 31 PG (ref 27–33)
MCH RBC QN AUTO: 31 PG (ref 27–33)
MCH RBC QN AUTO: 31.1 PG (ref 27–33)
MCH RBC QN AUTO: 31.1 PG (ref 27–33)
MCH RBC QN AUTO: 31.2 PG (ref 27–33)
MCH RBC QN AUTO: 31.3 PG (ref 27–33)
MCH RBC QN AUTO: 31.5 PG (ref 27–33)
MCH RBC QN AUTO: 31.6 PG (ref 27–33)
MCH RBC QN AUTO: 31.6 PG (ref 27–33)
MCH RBC QN AUTO: 31.7 PG (ref 27–33)
MCH RBC QN AUTO: 31.8 PG (ref 27–33)
MCH RBC QN AUTO: 32.1 PG (ref 27–33)
MCH RBC QN AUTO: 32.2 PG (ref 27–33)
MCH RBC QN AUTO: 32.3 PG (ref 27–33)
MCH RBC QN AUTO: 32.4 PG (ref 27–33)
MCH RBC QN AUTO: 32.5 PG (ref 27–33)
MCH RBC QN AUTO: 32.6 PG (ref 27–33)
MCH RBC QN AUTO: 32.7 PG (ref 27–33)
MCH RBC QN AUTO: 32.8 PG (ref 27–33)
MCH RBC QN AUTO: 32.9 PG (ref 27–33)
MCH RBC QN AUTO: 33 PG (ref 27–33)
MCH RBC QN AUTO: 33.1 PG (ref 27–33)
MCH RBC QN AUTO: 33.1 PG (ref 27–33)
MCH RBC QN AUTO: 33.2 PG (ref 27–33)
MCH RBC QN AUTO: 33.3 PG (ref 27–33)
MCH RBC QN AUTO: 33.4 PG (ref 27–33)
MCH RBC QN AUTO: 33.5 PG (ref 27–33)
MCH RBC QN AUTO: 33.5 PG (ref 27–33)
MCH RBC QN AUTO: 33.7 PG (ref 27–33)
MCH RBC QN AUTO: 33.8 PG (ref 27–33)
MCH RBC QN AUTO: 33.9 PG (ref 27–33)
MCH RBC QN AUTO: 33.9 PG (ref 27–33)
MCH RBC QN AUTO: 34 PG (ref 27–33)
MCH RBC QN AUTO: 34.1 PG (ref 27–33)
MCH RBC QN AUTO: 34.3 PG (ref 27–33)
MCH RBC QN AUTO: 34.4 PG (ref 27–33)
MCHC RBC AUTO-ENTMCNC: 28.4 G/DL (ref 33.7–35.3)
MCHC RBC AUTO-ENTMCNC: 28.6 G/DL (ref 33.7–35.3)
MCHC RBC AUTO-ENTMCNC: 28.9 G/DL (ref 33.7–35.3)
MCHC RBC AUTO-ENTMCNC: 29.8 G/DL (ref 33.7–35.3)
MCHC RBC AUTO-ENTMCNC: 29.9 G/DL (ref 33.7–35.3)
MCHC RBC AUTO-ENTMCNC: 30 G/DL (ref 33.7–35.3)
MCHC RBC AUTO-ENTMCNC: 30.1 G/DL (ref 33.7–35.3)
MCHC RBC AUTO-ENTMCNC: 30.2 G/DL (ref 33.7–35.3)
MCHC RBC AUTO-ENTMCNC: 30.4 G/DL (ref 33.7–35.3)
MCHC RBC AUTO-ENTMCNC: 30.4 G/DL (ref 33.7–35.3)
MCHC RBC AUTO-ENTMCNC: 30.5 G/DL (ref 33.7–35.3)
MCHC RBC AUTO-ENTMCNC: 30.5 G/DL (ref 33.7–35.3)
MCHC RBC AUTO-ENTMCNC: 30.7 G/DL (ref 33.7–35.3)
MCHC RBC AUTO-ENTMCNC: 30.8 G/DL (ref 33.7–35.3)
MCHC RBC AUTO-ENTMCNC: 30.9 G/DL (ref 33.7–35.3)
MCHC RBC AUTO-ENTMCNC: 31 G/DL (ref 33.7–35.3)
MCHC RBC AUTO-ENTMCNC: 31.1 G/DL (ref 33.7–35.3)
MCHC RBC AUTO-ENTMCNC: 31.2 G/DL (ref 33.7–35.3)
MCHC RBC AUTO-ENTMCNC: 31.2 G/DL (ref 33.7–35.3)
MCHC RBC AUTO-ENTMCNC: 31.3 G/DL (ref 33.7–35.3)
MCHC RBC AUTO-ENTMCNC: 31.4 G/DL (ref 33.7–35.3)
MCHC RBC AUTO-ENTMCNC: 31.4 G/DL (ref 33.7–35.3)
MCHC RBC AUTO-ENTMCNC: 31.5 G/DL (ref 33.7–35.3)
MCHC RBC AUTO-ENTMCNC: 31.5 G/DL (ref 33.7–35.3)
MCHC RBC AUTO-ENTMCNC: 31.6 G/DL (ref 33.7–35.3)
MCHC RBC AUTO-ENTMCNC: 31.7 G/DL (ref 33.7–35.3)
MCHC RBC AUTO-ENTMCNC: 31.8 G/DL (ref 33.7–35.3)
MCHC RBC AUTO-ENTMCNC: 31.9 G/DL (ref 33.7–35.3)
MCHC RBC AUTO-ENTMCNC: 32 G/DL (ref 33.7–35.3)
MCHC RBC AUTO-ENTMCNC: 32.1 G/DL (ref 33.7–35.3)
MCHC RBC AUTO-ENTMCNC: 32.2 G/DL (ref 33.7–35.3)
MCHC RBC AUTO-ENTMCNC: 32.3 G/DL (ref 33.7–35.3)
MCHC RBC AUTO-ENTMCNC: 32.4 G/DL (ref 33.7–35.3)
MCHC RBC AUTO-ENTMCNC: 32.5 G/DL (ref 33.7–35.3)
MCHC RBC AUTO-ENTMCNC: 32.5 G/DL (ref 33.7–35.3)
MCHC RBC AUTO-ENTMCNC: 32.6 G/DL (ref 33.7–35.3)
MCHC RBC AUTO-ENTMCNC: 32.6 G/DL (ref 33.7–35.3)
MCHC RBC AUTO-ENTMCNC: 32.7 G/DL (ref 33.7–35.3)
MCHC RBC AUTO-ENTMCNC: 32.8 G/DL (ref 33.7–35.3)
MCHC RBC AUTO-ENTMCNC: 32.9 G/DL (ref 33.7–35.3)
MCHC RBC AUTO-ENTMCNC: 33 G/DL (ref 33.7–35.3)
MCHC RBC AUTO-ENTMCNC: 33 G/DL (ref 33.7–35.3)
MCHC RBC AUTO-ENTMCNC: 33.3 G/DL (ref 33.7–35.3)
MCHC RBC AUTO-ENTMCNC: 33.3 G/DL (ref 33.7–35.3)
MCHC RBC AUTO-ENTMCNC: 33.7 G/DL (ref 33.7–35.3)
MCHC RBC AUTO-ENTMCNC: 33.8 G/DL (ref 33.7–35.3)
MCHC RBC AUTO-ENTMCNC: 34.1 G/DL (ref 33.7–35.3)
MCHC RBC AUTO-ENTMCNC: 34.8 G/DL (ref 33.7–35.3)
MCV RBC AUTO: 100 FL (ref 81.4–97.8)
MCV RBC AUTO: 100.3 FL (ref 81.4–97.8)
MCV RBC AUTO: 100.4 FL (ref 81.4–97.8)
MCV RBC AUTO: 100.5 FL (ref 81.4–97.8)
MCV RBC AUTO: 100.8 FL (ref 81.4–97.8)
MCV RBC AUTO: 101.1 FL (ref 81.4–97.8)
MCV RBC AUTO: 101.1 FL (ref 81.4–97.8)
MCV RBC AUTO: 101.2 FL (ref 81.4–97.8)
MCV RBC AUTO: 101.3 FL (ref 81.4–97.8)
MCV RBC AUTO: 101.5 FL (ref 81.4–97.8)
MCV RBC AUTO: 101.9 FL (ref 81.4–97.8)
MCV RBC AUTO: 102 FL (ref 81.4–97.8)
MCV RBC AUTO: 102.2 FL (ref 81.4–97.8)
MCV RBC AUTO: 102.2 FL (ref 81.4–97.8)
MCV RBC AUTO: 102.3 FL (ref 81.4–97.8)
MCV RBC AUTO: 102.5 FL (ref 81.4–97.8)
MCV RBC AUTO: 102.6 FL (ref 81.4–97.8)
MCV RBC AUTO: 102.7 FL (ref 81.4–97.8)
MCV RBC AUTO: 102.8 FL (ref 81.4–97.8)
MCV RBC AUTO: 102.9 FL (ref 81.4–97.8)
MCV RBC AUTO: 102.9 FL (ref 81.4–97.8)
MCV RBC AUTO: 103.1 FL (ref 81.4–97.8)
MCV RBC AUTO: 103.1 FL (ref 81.4–97.8)
MCV RBC AUTO: 103.2 FL (ref 81.4–97.8)
MCV RBC AUTO: 103.2 FL (ref 81.4–97.8)
MCV RBC AUTO: 103.3 FL (ref 81.4–97.8)
MCV RBC AUTO: 103.4 FL (ref 81.4–97.8)
MCV RBC AUTO: 103.6 FL (ref 81.4–97.8)
MCV RBC AUTO: 103.7 FL (ref 81.4–97.8)
MCV RBC AUTO: 103.7 FL (ref 81.4–97.8)
MCV RBC AUTO: 104 FL (ref 81.4–97.8)
MCV RBC AUTO: 104.1 FL (ref 81.4–97.8)
MCV RBC AUTO: 104.5 FL (ref 81.4–97.8)
MCV RBC AUTO: 104.8 FL (ref 81.4–97.8)
MCV RBC AUTO: 104.8 FL (ref 81.4–97.8)
MCV RBC AUTO: 105.1 FL (ref 81.4–97.8)
MCV RBC AUTO: 105.2 FL (ref 81.4–97.8)
MCV RBC AUTO: 105.2 FL (ref 81.4–97.8)
MCV RBC AUTO: 105.3 FL (ref 81.4–97.8)
MCV RBC AUTO: 105.3 FL (ref 81.4–97.8)
MCV RBC AUTO: 105.5 FL (ref 81.4–97.8)
MCV RBC AUTO: 105.6 FL (ref 81.4–97.8)
MCV RBC AUTO: 106.6 FL (ref 81.4–97.8)
MCV RBC AUTO: 106.7 FL (ref 81.4–97.8)
MCV RBC AUTO: 106.9 FL (ref 81.4–97.8)
MCV RBC AUTO: 107.6 FL (ref 81.4–97.8)
MCV RBC AUTO: 107.8 FL (ref 81.4–97.8)
MCV RBC AUTO: 109.3 FL (ref 81.4–97.8)
MCV RBC AUTO: 109.9 FL (ref 81.4–97.8)
MCV RBC AUTO: 110.9 FL (ref 81.4–97.8)
MCV RBC AUTO: 110.9 FL (ref 81.4–97.8)
MCV RBC AUTO: 96.7 FL (ref 81.4–97.8)
MCV RBC AUTO: 96.9 FL (ref 81.4–97.8)
MCV RBC AUTO: 97.8 FL (ref 81.4–97.8)
MCV RBC AUTO: 98.9 FL (ref 81.4–97.8)
MCV RBC AUTO: 99 FL (ref 81.4–97.8)
MCV RBC AUTO: 99.3 FL (ref 81.4–97.8)
MCV RBC AUTO: 99.5 FL (ref 81.4–97.8)
MCV RBC AUTO: 99.6 FL (ref 81.4–97.8)
MCV RBC AUTO: 99.7 FL (ref 81.4–97.8)
MESOTHL CELL NFR FLD: 1 %
MESOTHL CELL NFR FLD: 2 %
MICRO URNS: ABNORMAL
MICROCYTES BLD QL SMEAR: ABNORMAL
MONOCYTES # BLD AUTO: 0.47 K/UL (ref 0–0.85)
MONOCYTES # BLD AUTO: 0.56 K/UL (ref 0–0.85)
MONOCYTES # BLD AUTO: 0.58 K/UL (ref 0–0.85)
MONOCYTES # BLD AUTO: 0.63 K/UL (ref 0–0.85)
MONOCYTES # BLD AUTO: 0.65 K/UL (ref 0–0.85)
MONOCYTES # BLD AUTO: 0.69 K/UL (ref 0–0.85)
MONOCYTES # BLD AUTO: 0.71 K/UL (ref 0–0.85)
MONOCYTES # BLD AUTO: 0.71 K/UL (ref 0–0.85)
MONOCYTES # BLD AUTO: 0.72 K/UL (ref 0–0.85)
MONOCYTES # BLD AUTO: 0.74 K/UL (ref 0–0.85)
MONOCYTES # BLD AUTO: 0.74 K/UL (ref 0–0.85)
MONOCYTES # BLD AUTO: 0.75 K/UL (ref 0–0.85)
MONOCYTES # BLD AUTO: 0.77 K/UL (ref 0–0.85)
MONOCYTES # BLD AUTO: 0.78 K/UL (ref 0–0.85)
MONOCYTES # BLD AUTO: 0.81 K/UL (ref 0–0.85)
MONOCYTES # BLD AUTO: 0.81 K/UL (ref 0–0.85)
MONOCYTES # BLD AUTO: 0.83 K/UL (ref 0–0.85)
MONOCYTES # BLD AUTO: 0.83 K/UL (ref 0–0.85)
MONOCYTES # BLD AUTO: 0.85 K/UL (ref 0–0.85)
MONOCYTES # BLD AUTO: 0.85 K/UL (ref 0–0.85)
MONOCYTES # BLD AUTO: 0.86 K/UL (ref 0–0.85)
MONOCYTES # BLD AUTO: 0.86 K/UL (ref 0–0.85)
MONOCYTES # BLD AUTO: 0.88 K/UL (ref 0–0.85)
MONOCYTES # BLD AUTO: 0.88 K/UL (ref 0–0.85)
MONOCYTES # BLD AUTO: 0.89 K/UL (ref 0–0.85)
MONOCYTES # BLD AUTO: 0.89 K/UL (ref 0–0.85)
MONOCYTES # BLD AUTO: 0.9 K/UL (ref 0–0.85)
MONOCYTES # BLD AUTO: 0.91 K/UL (ref 0–0.85)
MONOCYTES # BLD AUTO: 0.92 K/UL (ref 0–0.85)
MONOCYTES # BLD AUTO: 0.93 K/UL (ref 0–0.85)
MONOCYTES # BLD AUTO: 0.94 K/UL (ref 0–0.85)
MONOCYTES # BLD AUTO: 0.95 K/UL (ref 0–0.85)
MONOCYTES # BLD AUTO: 0.95 K/UL (ref 0–0.85)
MONOCYTES # BLD AUTO: 0.96 K/UL (ref 0–0.85)
MONOCYTES # BLD AUTO: 0.98 K/UL (ref 0–0.85)
MONOCYTES # BLD AUTO: 0.99 K/UL (ref 0–0.85)
MONOCYTES # BLD AUTO: 1.01 K/UL (ref 0–0.85)
MONOCYTES # BLD AUTO: 1.02 K/UL (ref 0–0.85)
MONOCYTES # BLD AUTO: 1.05 K/UL (ref 0–0.85)
MONOCYTES # BLD AUTO: 1.07 K/UL (ref 0–0.85)
MONOCYTES # BLD AUTO: 1.09 K/UL (ref 0–0.85)
MONOCYTES # BLD AUTO: 1.11 K/UL (ref 0–0.85)
MONOCYTES # BLD AUTO: 1.14 K/UL (ref 0–0.85)
MONOCYTES # BLD AUTO: 1.15 K/UL (ref 0–0.85)
MONOCYTES # BLD AUTO: 1.18 K/UL (ref 0–0.85)
MONOCYTES # BLD AUTO: 1.2 K/UL (ref 0–0.85)
MONOCYTES # BLD AUTO: 1.22 K/UL (ref 0–0.85)
MONOCYTES # BLD AUTO: 1.26 K/UL (ref 0–0.85)
MONOCYTES # BLD AUTO: 1.32 K/UL (ref 0–0.85)
MONOCYTES # BLD AUTO: 1.34 K/UL (ref 0–0.85)
MONOCYTES # BLD AUTO: 1.71 K/UL (ref 0–0.85)
MONOCYTES # BLD AUTO: 1.74 K/UL (ref 0–0.85)
MONOCYTES NFR BLD AUTO: 10 % (ref 0–13.4)
MONOCYTES NFR BLD AUTO: 10 % (ref 0–13.4)
MONOCYTES NFR BLD AUTO: 10.3 % (ref 0–13.4)
MONOCYTES NFR BLD AUTO: 10.4 % (ref 0–13.4)
MONOCYTES NFR BLD AUTO: 10.4 % (ref 0–13.4)
MONOCYTES NFR BLD AUTO: 10.5 % (ref 0–13.4)
MONOCYTES NFR BLD AUTO: 10.5 % (ref 0–13.4)
MONOCYTES NFR BLD AUTO: 10.6 % (ref 0–13.4)
MONOCYTES NFR BLD AUTO: 10.7 % (ref 0–13.4)
MONOCYTES NFR BLD AUTO: 11 % (ref 0–13.4)
MONOCYTES NFR BLD AUTO: 11.1 % (ref 0–13.4)
MONOCYTES NFR BLD AUTO: 11.3 % (ref 0–13.4)
MONOCYTES NFR BLD AUTO: 11.7 % (ref 0–13.4)
MONOCYTES NFR BLD AUTO: 12.3 % (ref 0–13.4)
MONOCYTES NFR BLD AUTO: 12.8 % (ref 0–13.4)
MONOCYTES NFR BLD AUTO: 13.1 % (ref 0–13.4)
MONOCYTES NFR BLD AUTO: 13.2 % (ref 0–13.4)
MONOCYTES NFR BLD AUTO: 13.5 % (ref 0–13.4)
MONOCYTES NFR BLD AUTO: 13.8 % (ref 0–13.4)
MONOCYTES NFR BLD AUTO: 14.5 % (ref 0–13.4)
MONOCYTES NFR BLD AUTO: 14.6 % (ref 0–13.4)
MONOCYTES NFR BLD AUTO: 6.1 % (ref 0–13.4)
MONOCYTES NFR BLD AUTO: 6.1 % (ref 0–13.4)
MONOCYTES NFR BLD AUTO: 6.2 % (ref 0–13.4)
MONOCYTES NFR BLD AUTO: 6.5 % (ref 0–13.4)
MONOCYTES NFR BLD AUTO: 6.6 % (ref 0–13.4)
MONOCYTES NFR BLD AUTO: 7.1 % (ref 0–13.4)
MONOCYTES NFR BLD AUTO: 7.3 % (ref 0–13.4)
MONOCYTES NFR BLD AUTO: 7.5 % (ref 0–13.4)
MONOCYTES NFR BLD AUTO: 7.6 % (ref 0–13.4)
MONOCYTES NFR BLD AUTO: 7.6 % (ref 0–13.4)
MONOCYTES NFR BLD AUTO: 7.7 % (ref 0–13.4)
MONOCYTES NFR BLD AUTO: 7.8 % (ref 0–13.4)
MONOCYTES NFR BLD AUTO: 7.8 % (ref 0–13.4)
MONOCYTES NFR BLD AUTO: 8 % (ref 0–13.4)
MONOCYTES NFR BLD AUTO: 8 % (ref 0–13.4)
MONOCYTES NFR BLD AUTO: 8.1 % (ref 0–13.4)
MONOCYTES NFR BLD AUTO: 8.5 % (ref 0–13.4)
MONOCYTES NFR BLD AUTO: 8.5 % (ref 0–13.4)
MONOCYTES NFR BLD AUTO: 8.6 % (ref 0–13.4)
MONOCYTES NFR BLD AUTO: 8.7 % (ref 0–13.4)
MONOCYTES NFR BLD AUTO: 8.8 % (ref 0–13.4)
MONOCYTES NFR BLD AUTO: 8.9 % (ref 0–13.4)
MONOCYTES NFR BLD AUTO: 8.9 % (ref 0–13.4)
MONOCYTES NFR BLD AUTO: 9 % (ref 0–13.4)
MONOCYTES NFR BLD AUTO: 9.2 % (ref 0–13.4)
MONOCYTES NFR BLD AUTO: 9.2 % (ref 0–13.4)
MONOCYTES NFR BLD AUTO: 9.5 % (ref 0–13.4)
MONOCYTES NFR BLD AUTO: 9.8 % (ref 0–13.4)
MONONUC CELLS NFR FLD: 1 %
MONONUC CELLS NFR FLD: 2 %
MONONUC CELLS NFR FLD: 22 %
MORPHOLOGY BLD-IMP: NORMAL
MRSA DNA SPEC QL NAA+PROBE: NORMAL
NEUTROPHILS # BLD AUTO: 10.48 K/UL (ref 1.82–7.42)
NEUTROPHILS # BLD AUTO: 11.25 K/UL (ref 1.82–7.42)
NEUTROPHILS # BLD AUTO: 11.58 K/UL (ref 1.82–7.42)
NEUTROPHILS # BLD AUTO: 12.57 K/UL (ref 1.82–7.42)
NEUTROPHILS # BLD AUTO: 12.81 K/UL (ref 1.82–7.42)
NEUTROPHILS # BLD AUTO: 13.39 K/UL (ref 1.82–7.42)
NEUTROPHILS # BLD AUTO: 13.51 K/UL (ref 1.82–7.42)
NEUTROPHILS # BLD AUTO: 15.1 K/UL (ref 1.82–7.42)
NEUTROPHILS # BLD AUTO: 15.5 K/UL (ref 1.82–7.42)
NEUTROPHILS # BLD AUTO: 17.52 K/UL (ref 1.82–7.42)
NEUTROPHILS # BLD AUTO: 3.65 K/UL (ref 1.82–7.42)
NEUTROPHILS # BLD AUTO: 4.2 K/UL (ref 1.82–7.42)
NEUTROPHILS # BLD AUTO: 4.48 K/UL (ref 1.82–7.42)
NEUTROPHILS # BLD AUTO: 4.57 K/UL (ref 1.82–7.42)
NEUTROPHILS # BLD AUTO: 4.65 K/UL (ref 1.82–7.42)
NEUTROPHILS # BLD AUTO: 4.86 K/UL (ref 1.82–7.42)
NEUTROPHILS # BLD AUTO: 4.89 K/UL (ref 1.82–7.42)
NEUTROPHILS # BLD AUTO: 5.04 K/UL (ref 1.82–7.42)
NEUTROPHILS # BLD AUTO: 5.18 K/UL (ref 1.82–7.42)
NEUTROPHILS # BLD AUTO: 5.33 K/UL (ref 1.82–7.42)
NEUTROPHILS # BLD AUTO: 5.35 K/UL (ref 1.82–7.42)
NEUTROPHILS # BLD AUTO: 5.4 K/UL (ref 1.82–7.42)
NEUTROPHILS # BLD AUTO: 5.43 K/UL (ref 1.82–7.42)
NEUTROPHILS # BLD AUTO: 5.45 K/UL (ref 1.82–7.42)
NEUTROPHILS # BLD AUTO: 5.56 K/UL (ref 1.82–7.42)
NEUTROPHILS # BLD AUTO: 5.75 K/UL (ref 1.82–7.42)
NEUTROPHILS # BLD AUTO: 5.78 K/UL (ref 1.82–7.42)
NEUTROPHILS # BLD AUTO: 5.87 K/UL (ref 1.82–7.42)
NEUTROPHILS # BLD AUTO: 5.89 K/UL (ref 1.82–7.42)
NEUTROPHILS # BLD AUTO: 6.05 K/UL (ref 1.82–7.42)
NEUTROPHILS # BLD AUTO: 6.1 K/UL (ref 1.82–7.42)
NEUTROPHILS # BLD AUTO: 6.4 K/UL (ref 1.82–7.42)
NEUTROPHILS # BLD AUTO: 6.75 K/UL (ref 1.82–7.42)
NEUTROPHILS # BLD AUTO: 6.76 K/UL (ref 1.82–7.42)
NEUTROPHILS # BLD AUTO: 6.89 K/UL (ref 1.82–7.42)
NEUTROPHILS # BLD AUTO: 6.92 K/UL (ref 1.82–7.42)
NEUTROPHILS # BLD AUTO: 7 K/UL (ref 1.82–7.42)
NEUTROPHILS # BLD AUTO: 7.01 K/UL (ref 1.82–7.42)
NEUTROPHILS # BLD AUTO: 7.15 K/UL (ref 1.82–7.42)
NEUTROPHILS # BLD AUTO: 7.22 K/UL (ref 1.82–7.42)
NEUTROPHILS # BLD AUTO: 7.25 K/UL (ref 1.82–7.42)
NEUTROPHILS # BLD AUTO: 7.33 K/UL (ref 1.82–7.42)
NEUTROPHILS # BLD AUTO: 7.58 K/UL (ref 1.82–7.42)
NEUTROPHILS # BLD AUTO: 8.09 K/UL (ref 1.82–7.42)
NEUTROPHILS # BLD AUTO: 8.13 K/UL (ref 1.82–7.42)
NEUTROPHILS # BLD AUTO: 8.63 K/UL (ref 1.82–7.42)
NEUTROPHILS # BLD AUTO: 8.65 K/UL (ref 1.82–7.42)
NEUTROPHILS # BLD AUTO: 8.69 K/UL (ref 1.82–7.42)
NEUTROPHILS # BLD AUTO: 8.87 K/UL (ref 1.82–7.42)
NEUTROPHILS # BLD AUTO: 8.96 K/UL (ref 1.82–7.42)
NEUTROPHILS # BLD AUTO: 9.39 K/UL (ref 1.82–7.42)
NEUTROPHILS # BLD AUTO: 9.86 K/UL (ref 1.82–7.42)
NEUTROPHILS # BLD AUTO: 9.91 K/UL (ref 1.82–7.42)
NEUTROPHILS # BLD AUTO: 9.97 K/UL (ref 1.82–7.42)
NEUTROPHILS NFR BLD: 59 % (ref 44–72)
NEUTROPHILS NFR BLD: 61.7 % (ref 44–72)
NEUTROPHILS NFR BLD: 64.1 % (ref 44–72)
NEUTROPHILS NFR BLD: 64.3 % (ref 44–72)
NEUTROPHILS NFR BLD: 65.3 % (ref 44–72)
NEUTROPHILS NFR BLD: 65.5 % (ref 44–72)
NEUTROPHILS NFR BLD: 66.5 % (ref 44–72)
NEUTROPHILS NFR BLD: 67.1 % (ref 44–72)
NEUTROPHILS NFR BLD: 69.3 % (ref 44–72)
NEUTROPHILS NFR BLD: 69.9 % (ref 44–72)
NEUTROPHILS NFR BLD: 70.2 % (ref 44–72)
NEUTROPHILS NFR BLD: 70.5 % (ref 44–72)
NEUTROPHILS NFR BLD: 71.5 % (ref 44–72)
NEUTROPHILS NFR BLD: 71.6 % (ref 44–72)
NEUTROPHILS NFR BLD: 72.3 % (ref 44–72)
NEUTROPHILS NFR BLD: 72.9 % (ref 44–72)
NEUTROPHILS NFR BLD: 73 % (ref 44–72)
NEUTROPHILS NFR BLD: 73.6 % (ref 44–72)
NEUTROPHILS NFR BLD: 73.6 % (ref 44–72)
NEUTROPHILS NFR BLD: 74 % (ref 44–72)
NEUTROPHILS NFR BLD: 74.1 % (ref 44–72)
NEUTROPHILS NFR BLD: 74.6 % (ref 44–72)
NEUTROPHILS NFR BLD: 74.7 % (ref 44–72)
NEUTROPHILS NFR BLD: 75.1 % (ref 44–72)
NEUTROPHILS NFR BLD: 75.1 % (ref 44–72)
NEUTROPHILS NFR BLD: 75.3 % (ref 44–72)
NEUTROPHILS NFR BLD: 75.3 % (ref 44–72)
NEUTROPHILS NFR BLD: 75.6 % (ref 44–72)
NEUTROPHILS NFR BLD: 75.8 % (ref 44–72)
NEUTROPHILS NFR BLD: 76 % (ref 44–72)
NEUTROPHILS NFR BLD: 77.6 % (ref 44–72)
NEUTROPHILS NFR BLD: 77.6 % (ref 44–72)
NEUTROPHILS NFR BLD: 77.9 % (ref 44–72)
NEUTROPHILS NFR BLD: 78.7 % (ref 44–72)
NEUTROPHILS NFR BLD: 78.8 % (ref 44–72)
NEUTROPHILS NFR BLD: 79.3 % (ref 44–72)
NEUTROPHILS NFR BLD: 79.6 % (ref 44–72)
NEUTROPHILS NFR BLD: 79.7 % (ref 44–72)
NEUTROPHILS NFR BLD: 80.1 % (ref 44–72)
NEUTROPHILS NFR BLD: 80.3 % (ref 44–72)
NEUTROPHILS NFR BLD: 80.5 % (ref 44–72)
NEUTROPHILS NFR BLD: 80.8 % (ref 44–72)
NEUTROPHILS NFR BLD: 80.9 % (ref 44–72)
NEUTROPHILS NFR BLD: 81 % (ref 44–72)
NEUTROPHILS NFR BLD: 82.1 % (ref 44–72)
NEUTROPHILS NFR BLD: 82.2 % (ref 44–72)
NEUTROPHILS NFR BLD: 85 % (ref 44–72)
NEUTROPHILS NFR BLD: 86.7 % (ref 44–72)
NEUTROPHILS NFR BLD: 87.5 % (ref 44–72)
NEUTROPHILS NFR BLD: 87.6 % (ref 44–72)
NEUTROPHILS NFR FLD: 46 %
NEUTROPHILS NFR FLD: 53 %
NEUTROPHILS NFR FLD: 8 %
NEUTS HYPERSEG BLD QL SMEAR: NORMAL
NITRITE UR QL STRIP.AUTO: NEGATIVE
NRBC # BLD AUTO: 0 K/UL
NRBC # BLD AUTO: 0.02 K/UL
NRBC # BLD AUTO: 0.02 K/UL
NRBC # BLD AUTO: 0.03 K/UL
NRBC # BLD AUTO: 0.03 K/UL
NRBC BLD-RTO: 0 /100 WBC
NRBC BLD-RTO: 0.3 /100 WBC
NT-PROBNP SERPL IA-MCNC: ABNORMAL PG/ML (ref 0–125)
OVALOCYTES BLD QL SMEAR: NORMAL
PA AA BLD-ACNC: 0 %
PA ADP BLD-ACNC: 49.6 %
PATHOLOGY CONSULT NOTE: NORMAL
PATHOLOGY CONSULT NOTE: NORMAL
PCO2 BLDA: 42.7 MMHG (ref 26–37)
PH BLDA: 7.41 [PH] (ref 7.4–7.5)
PH FLD: 8 [PH]
PH UR STRIP.AUTO: 7 [PH] (ref 5–8)
PH UR STRIP.AUTO: 7.5 [PH] (ref 5–8)
PH UR STRIP.AUTO: 8.5 [PH] (ref 5–8)
PHOSPHATE SERPL-MCNC: 1.5 MG/DL (ref 2.5–4.5)
PHOSPHATE SERPL-MCNC: 1.8 MG/DL (ref 2.5–4.5)
PHOSPHATE SERPL-MCNC: 2.4 MG/DL (ref 2.5–4.5)
PHOSPHATE SERPL-MCNC: 2.5 MG/DL (ref 2.5–4.5)
PHOSPHATE SERPL-MCNC: 2.8 MG/DL (ref 2.5–4.5)
PHOSPHATE SERPL-MCNC: 3 MG/DL (ref 2.5–4.5)
PHOSPHATE SERPL-MCNC: 3.1 MG/DL (ref 2.5–4.5)
PHOSPHATE SERPL-MCNC: 3.2 MG/DL (ref 2.5–4.5)
PHOSPHATE SERPL-MCNC: 3.3 MG/DL (ref 2.5–4.5)
PHOSPHATE SERPL-MCNC: 3.5 MG/DL (ref 2.5–4.5)
PHOSPHATE SERPL-MCNC: 3.5 MG/DL (ref 2.5–4.5)
PHOSPHATE SERPL-MCNC: 3.6 MG/DL (ref 2.5–4.5)
PHOSPHATE SERPL-MCNC: 3.7 MG/DL (ref 2.5–4.5)
PHOSPHATE SERPL-MCNC: 3.7 MG/DL (ref 2.5–4.5)
PHOSPHATE SERPL-MCNC: 3.8 MG/DL (ref 2.5–4.5)
PHOSPHATE SERPL-MCNC: 3.8 MG/DL (ref 2.5–4.5)
PHOSPHATE SERPL-MCNC: 3.9 MG/DL (ref 2.5–4.5)
PHOSPHATE SERPL-MCNC: 4.1 MG/DL (ref 2.5–4.5)
PHOSPHATE SERPL-MCNC: 4.4 MG/DL (ref 2.5–4.5)
PHOSPHATE SERPL-MCNC: 4.4 MG/DL (ref 2.5–4.5)
PHOSPHATE SERPL-MCNC: 4.5 MG/DL (ref 2.5–4.5)
PHOSPHATE SERPL-MCNC: 4.5 MG/DL (ref 2.5–4.5)
PHOSPHATE SERPL-MCNC: 5.7 MG/DL (ref 2.5–4.5)
PHOSPHATE SERPL-MCNC: 5.9 MG/DL (ref 2.5–4.5)
PHOSPHATE SERPL-MCNC: 6.5 MG/DL (ref 2.5–4.5)
PHOSPHATE SERPL-MCNC: 7.3 MG/DL (ref 2.5–4.5)
PLATELET # BLD AUTO: 178 K/UL (ref 164–446)
PLATELET # BLD AUTO: 190 K/UL (ref 164–446)
PLATELET # BLD AUTO: 194 K/UL (ref 164–446)
PLATELET # BLD AUTO: 196 K/UL (ref 164–446)
PLATELET # BLD AUTO: 207 K/UL (ref 164–446)
PLATELET # BLD AUTO: 209 K/UL (ref 164–446)
PLATELET # BLD AUTO: 210 K/UL (ref 164–446)
PLATELET # BLD AUTO: 215 K/UL (ref 164–446)
PLATELET # BLD AUTO: 218 K/UL (ref 164–446)
PLATELET # BLD AUTO: 218 K/UL (ref 164–446)
PLATELET # BLD AUTO: 219 K/UL (ref 164–446)
PLATELET # BLD AUTO: 222 K/UL (ref 164–446)
PLATELET # BLD AUTO: 223 K/UL (ref 164–446)
PLATELET # BLD AUTO: 227 K/UL (ref 164–446)
PLATELET # BLD AUTO: 232 K/UL (ref 164–446)
PLATELET # BLD AUTO: 233 K/UL (ref 164–446)
PLATELET # BLD AUTO: 233 K/UL (ref 164–446)
PLATELET # BLD AUTO: 239 K/UL (ref 164–446)
PLATELET # BLD AUTO: 240 K/UL (ref 164–446)
PLATELET # BLD AUTO: 240 K/UL (ref 164–446)
PLATELET # BLD AUTO: 243 K/UL (ref 164–446)
PLATELET # BLD AUTO: 244 K/UL (ref 164–446)
PLATELET # BLD AUTO: 245 K/UL (ref 164–446)
PLATELET # BLD AUTO: 247 K/UL (ref 164–446)
PLATELET # BLD AUTO: 249 K/UL (ref 164–446)
PLATELET # BLD AUTO: 253 K/UL (ref 164–446)
PLATELET # BLD AUTO: 258 K/UL (ref 164–446)
PLATELET # BLD AUTO: 261 K/UL (ref 164–446)
PLATELET # BLD AUTO: 267 K/UL (ref 164–446)
PLATELET # BLD AUTO: 268 K/UL (ref 164–446)
PLATELET # BLD AUTO: 271 K/UL (ref 164–446)
PLATELET # BLD AUTO: 276 K/UL (ref 164–446)
PLATELET # BLD AUTO: 276 K/UL (ref 164–446)
PLATELET # BLD AUTO: 279 K/UL (ref 164–446)
PLATELET # BLD AUTO: 279 K/UL (ref 164–446)
PLATELET # BLD AUTO: 284 K/UL (ref 164–446)
PLATELET # BLD AUTO: 288 K/UL (ref 164–446)
PLATELET # BLD AUTO: 295 K/UL (ref 164–446)
PLATELET # BLD AUTO: 295 K/UL (ref 164–446)
PLATELET # BLD AUTO: 301 K/UL (ref 164–446)
PLATELET # BLD AUTO: 301 K/UL (ref 164–446)
PLATELET # BLD AUTO: 305 K/UL (ref 164–446)
PLATELET # BLD AUTO: 306 K/UL (ref 164–446)
PLATELET # BLD AUTO: 307 K/UL (ref 164–446)
PLATELET # BLD AUTO: 311 K/UL (ref 164–446)
PLATELET # BLD AUTO: 312 K/UL (ref 164–446)
PLATELET # BLD AUTO: 312 K/UL (ref 164–446)
PLATELET # BLD AUTO: 319 K/UL (ref 164–446)
PLATELET # BLD AUTO: 321 K/UL (ref 164–446)
PLATELET # BLD AUTO: 321 K/UL (ref 164–446)
PLATELET # BLD AUTO: 323 K/UL (ref 164–446)
PLATELET # BLD AUTO: 324 K/UL (ref 164–446)
PLATELET # BLD AUTO: 329 K/UL (ref 164–446)
PLATELET # BLD AUTO: 330 K/UL (ref 164–446)
PLATELET # BLD AUTO: 332 K/UL (ref 164–446)
PLATELET # BLD AUTO: 334 K/UL (ref 164–446)
PLATELET # BLD AUTO: 336 K/UL (ref 164–446)
PLATELET # BLD AUTO: 337 K/UL (ref 164–446)
PLATELET # BLD AUTO: 337 K/UL (ref 164–446)
PLATELET # BLD AUTO: 339 K/UL (ref 164–446)
PLATELET # BLD AUTO: 339 K/UL (ref 164–446)
PLATELET # BLD AUTO: 340 K/UL (ref 164–446)
PLATELET # BLD AUTO: 341 K/UL (ref 164–446)
PLATELET # BLD AUTO: 343 K/UL (ref 164–446)
PLATELET # BLD AUTO: 352 K/UL (ref 164–446)
PLATELET # BLD AUTO: 354 K/UL (ref 164–446)
PLATELET # BLD AUTO: 356 K/UL (ref 164–446)
PLATELET # BLD AUTO: 357 K/UL (ref 164–446)
PLATELET # BLD AUTO: 361 K/UL (ref 164–446)
PLATELET # BLD AUTO: 361 K/UL (ref 164–446)
PLATELET # BLD AUTO: 369 K/UL (ref 164–446)
PLATELET # BLD AUTO: 373 K/UL (ref 164–446)
PLATELET # BLD AUTO: 373 K/UL (ref 164–446)
PLATELET # BLD AUTO: 376 K/UL (ref 164–446)
PLATELET # BLD AUTO: 383 K/UL (ref 164–446)
PLATELET # BLD AUTO: 391 K/UL (ref 164–446)
PLATELET # BLD AUTO: 394 K/UL (ref 164–446)
PLATELET # BLD AUTO: 443 K/UL (ref 164–446)
PLATELET BLD QL SMEAR: NORMAL
PMV BLD AUTO: 10 FL (ref 9–12.9)
PMV BLD AUTO: 10.1 FL (ref 9–12.9)
PMV BLD AUTO: 10.3 FL (ref 9–12.9)
PMV BLD AUTO: 8.5 FL (ref 9–12.9)
PMV BLD AUTO: 8.6 FL (ref 9–12.9)
PMV BLD AUTO: 8.7 FL (ref 9–12.9)
PMV BLD AUTO: 8.8 FL (ref 9–12.9)
PMV BLD AUTO: 8.9 FL (ref 9–12.9)
PMV BLD AUTO: 9 FL (ref 9–12.9)
PMV BLD AUTO: 9.1 FL (ref 9–12.9)
PMV BLD AUTO: 9.2 FL (ref 9–12.9)
PMV BLD AUTO: 9.3 FL (ref 9–12.9)
PMV BLD AUTO: 9.4 FL (ref 9–12.9)
PMV BLD AUTO: 9.5 FL (ref 9–12.9)
PMV BLD AUTO: 9.6 FL (ref 9–12.9)
PMV BLD AUTO: 9.7 FL (ref 9–12.9)
PMV BLD AUTO: 9.8 FL (ref 9–12.9)
PMV BLD AUTO: 9.8 FL (ref 9–12.9)
PMV BLD AUTO: 9.9 FL (ref 9–12.9)
PMV BLD AUTO: 9.9 FL (ref 9–12.9)
PO2 BLDA: 31.5 MMHG (ref 64–87)
POIKILOCYTOSIS BLD QL SMEAR: NORMAL
POLYCHROMASIA BLD QL SMEAR: NORMAL
POTASSIUM SERPL-SCNC: 3.1 MMOL/L (ref 3.6–5.5)
POTASSIUM SERPL-SCNC: 3.3 MMOL/L (ref 3.6–5.5)
POTASSIUM SERPL-SCNC: 3.5 MMOL/L (ref 3.6–5.5)
POTASSIUM SERPL-SCNC: 3.6 MMOL/L (ref 3.6–5.5)
POTASSIUM SERPL-SCNC: 3.8 MMOL/L (ref 3.6–5.5)
POTASSIUM SERPL-SCNC: 3.8 MMOL/L (ref 3.6–5.5)
POTASSIUM SERPL-SCNC: 3.9 MMOL/L (ref 3.6–5.5)
POTASSIUM SERPL-SCNC: 4 MMOL/L (ref 3.6–5.5)
POTASSIUM SERPL-SCNC: 4.1 MMOL/L (ref 3.6–5.5)
POTASSIUM SERPL-SCNC: 4.2 MMOL/L (ref 3.6–5.5)
POTASSIUM SERPL-SCNC: 4.3 MMOL/L (ref 3.6–5.5)
POTASSIUM SERPL-SCNC: 4.4 MMOL/L (ref 3.6–5.5)
POTASSIUM SERPL-SCNC: 4.4 MMOL/L (ref 3.6–5.5)
POTASSIUM SERPL-SCNC: 4.5 MMOL/L (ref 3.6–5.5)
POTASSIUM SERPL-SCNC: 4.6 MMOL/L (ref 3.6–5.5)
POTASSIUM SERPL-SCNC: 4.7 MMOL/L (ref 3.6–5.5)
POTASSIUM SERPL-SCNC: 4.8 MMOL/L (ref 3.6–5.5)
POTASSIUM SERPL-SCNC: 4.9 MMOL/L (ref 3.6–5.5)
POTASSIUM SERPL-SCNC: 4.9 MMOL/L (ref 3.6–5.5)
POTASSIUM SERPL-SCNC: 5 MMOL/L (ref 3.6–5.5)
POTASSIUM SERPL-SCNC: 5.1 MMOL/L (ref 3.6–5.5)
POTASSIUM SERPL-SCNC: 5.1 MMOL/L (ref 3.6–5.5)
POTASSIUM SERPL-SCNC: 5.2 MMOL/L (ref 3.6–5.5)
POTASSIUM SERPL-SCNC: 5.3 MMOL/L (ref 3.6–5.5)
POTASSIUM SERPL-SCNC: 5.3 MMOL/L (ref 3.6–5.5)
POTASSIUM SERPL-SCNC: 5.4 MMOL/L (ref 3.6–5.5)
POTASSIUM SERPL-SCNC: 5.4 MMOL/L (ref 3.6–5.5)
POTASSIUM SERPL-SCNC: 5.5 MMOL/L (ref 3.6–5.5)
POTASSIUM SERPL-SCNC: 5.6 MMOL/L (ref 3.6–5.5)
POTASSIUM SERPL-SCNC: 5.7 MMOL/L (ref 3.6–5.5)
POTASSIUM SERPL-SCNC: 5.8 MMOL/L (ref 3.6–5.5)
POTASSIUM SERPL-SCNC: 6 MMOL/L (ref 3.6–5.5)
PROCALCITONIN SERPL-MCNC: 0.13 NG/ML
PROCALCITONIN SERPL-MCNC: 0.64 NG/ML
PROCALCITONIN SERPL-MCNC: 0.68 NG/ML
PROCALCITONIN SERPL-MCNC: 0.86 NG/ML
PROCALCITONIN SERPL-MCNC: 1.57 NG/ML
PROCALCITONIN SERPL-MCNC: 1.65 NG/ML
PROCALCITONIN SERPL-MCNC: 11.33 NG/ML
PROCALCITONIN SERPL-MCNC: 2.41 NG/ML
PROCALCITONIN SERPL-MCNC: 30.5 NG/ML
PROCALCITONIN SERPL-MCNC: 6.67 NG/ML
PRODUCT TYPE UPROD: ABNORMAL
PROT FLD-MCNC: 3 G/DL
PROT FLD-MCNC: 3.4 G/DL
PROT FLD-MCNC: 3.5 G/DL
PROT SERPL-MCNC: 5.3 G/DL (ref 6–8.2)
PROT SERPL-MCNC: 5.4 G/DL (ref 6–8.2)
PROT SERPL-MCNC: 5.5 G/DL (ref 6–8.2)
PROT SERPL-MCNC: 5.6 G/DL (ref 6–8.2)
PROT SERPL-MCNC: 5.6 G/DL (ref 6–8.2)
PROT SERPL-MCNC: 5.7 G/DL (ref 6–8.2)
PROT SERPL-MCNC: 5.8 G/DL (ref 6–8.2)
PROT SERPL-MCNC: 5.9 G/DL (ref 6–8.2)
PROT SERPL-MCNC: 6 G/DL (ref 6–8.2)
PROT SERPL-MCNC: 6.1 G/DL (ref 6–8.2)
PROT SERPL-MCNC: 6.1 G/DL (ref 6–8.2)
PROT SERPL-MCNC: 6.2 G/DL (ref 6–8.2)
PROT SERPL-MCNC: 6.2 G/DL (ref 6–8.2)
PROT SERPL-MCNC: 6.3 G/DL (ref 6–8.2)
PROT SERPL-MCNC: 6.5 G/DL (ref 6–8.2)
PROT SERPL-MCNC: 6.8 G/DL (ref 6–8.2)
PROT SERPL-MCNC: 6.8 G/DL (ref 6–8.2)
PROT SERPL-MCNC: 7 G/DL (ref 6–8.2)
PROT SERPL-MCNC: 7.6 G/DL (ref 6–8.2)
PROT UR QL STRIP: 100 MG/DL
PROT UR QL STRIP: 300 MG/DL
PROTHROMBIN TIME: 16.4 SEC (ref 12–14.6)
PROTHROMBIN TIME: 19 SEC (ref 12–14.6)
PROTHROMBIN TIME: 23.8 SEC (ref 12–14.6)
PTH-INTACT SERPL-MCNC: 109 PG/ML (ref 14–72)
RBC # BLD AUTO: 1.92 M/UL (ref 4.7–6.1)
RBC # BLD AUTO: 1.93 M/UL (ref 4.7–6.1)
RBC # BLD AUTO: 1.94 M/UL (ref 4.7–6.1)
RBC # BLD AUTO: 2 M/UL (ref 4.7–6.1)
RBC # BLD AUTO: 2.04 M/UL (ref 4.7–6.1)
RBC # BLD AUTO: 2.05 M/UL (ref 4.7–6.1)
RBC # BLD AUTO: 2.08 M/UL (ref 4.7–6.1)
RBC # BLD AUTO: 2.21 M/UL (ref 4.7–6.1)
RBC # BLD AUTO: 2.25 M/UL (ref 4.7–6.1)
RBC # BLD AUTO: 2.25 M/UL (ref 4.7–6.1)
RBC # BLD AUTO: 2.26 M/UL (ref 4.7–6.1)
RBC # BLD AUTO: 2.27 M/UL (ref 4.7–6.1)
RBC # BLD AUTO: 2.29 M/UL (ref 4.7–6.1)
RBC # BLD AUTO: 2.3 M/UL (ref 4.7–6.1)
RBC # BLD AUTO: 2.3 M/UL (ref 4.7–6.1)
RBC # BLD AUTO: 2.31 M/UL (ref 4.7–6.1)
RBC # BLD AUTO: 2.32 M/UL (ref 4.7–6.1)
RBC # BLD AUTO: 2.35 M/UL (ref 4.7–6.1)
RBC # BLD AUTO: 2.35 M/UL (ref 4.7–6.1)
RBC # BLD AUTO: 2.38 M/UL (ref 4.7–6.1)
RBC # BLD AUTO: 2.38 M/UL (ref 4.7–6.1)
RBC # BLD AUTO: 2.39 M/UL (ref 4.7–6.1)
RBC # BLD AUTO: 2.4 M/UL (ref 4.7–6.1)
RBC # BLD AUTO: 2.4 M/UL (ref 4.7–6.1)
RBC # BLD AUTO: 2.42 M/UL (ref 4.7–6.1)
RBC # BLD AUTO: 2.43 M/UL (ref 4.7–6.1)
RBC # BLD AUTO: 2.45 M/UL (ref 4.7–6.1)
RBC # BLD AUTO: 2.45 M/UL (ref 4.7–6.1)
RBC # BLD AUTO: 2.46 M/UL (ref 4.7–6.1)
RBC # BLD AUTO: 2.48 M/UL (ref 4.7–6.1)
RBC # BLD AUTO: 2.5 M/UL (ref 4.7–6.1)
RBC # BLD AUTO: 2.5 M/UL (ref 4.7–6.1)
RBC # BLD AUTO: 2.52 M/UL (ref 4.7–6.1)
RBC # BLD AUTO: 2.53 M/UL (ref 4.7–6.1)
RBC # BLD AUTO: 2.54 M/UL (ref 4.7–6.1)
RBC # BLD AUTO: 2.58 M/UL (ref 4.7–6.1)
RBC # BLD AUTO: 2.58 M/UL (ref 4.7–6.1)
RBC # BLD AUTO: 2.59 M/UL (ref 4.7–6.1)
RBC # BLD AUTO: 2.59 M/UL (ref 4.7–6.1)
RBC # BLD AUTO: 2.6 M/UL (ref 4.7–6.1)
RBC # BLD AUTO: 2.63 M/UL (ref 4.7–6.1)
RBC # BLD AUTO: 2.64 M/UL (ref 4.7–6.1)
RBC # BLD AUTO: 2.67 M/UL (ref 4.7–6.1)
RBC # BLD AUTO: 2.68 M/UL (ref 4.7–6.1)
RBC # BLD AUTO: 2.72 M/UL (ref 4.7–6.1)
RBC # BLD AUTO: 2.73 M/UL (ref 4.7–6.1)
RBC # BLD AUTO: 2.74 M/UL (ref 4.7–6.1)
RBC # BLD AUTO: 2.74 M/UL (ref 4.7–6.1)
RBC # BLD AUTO: 2.76 M/UL (ref 4.7–6.1)
RBC # BLD AUTO: 2.77 M/UL (ref 4.7–6.1)
RBC # BLD AUTO: 2.78 M/UL (ref 4.7–6.1)
RBC # BLD AUTO: 2.79 M/UL (ref 4.7–6.1)
RBC # BLD AUTO: 2.79 M/UL (ref 4.7–6.1)
RBC # BLD AUTO: 2.8 M/UL (ref 4.7–6.1)
RBC # BLD AUTO: 2.85 M/UL (ref 4.7–6.1)
RBC # BLD AUTO: 2.85 M/UL (ref 4.7–6.1)
RBC # BLD AUTO: 2.9 M/UL (ref 4.7–6.1)
RBC # BLD AUTO: 2.9 M/UL (ref 4.7–6.1)
RBC # BLD AUTO: 2.93 M/UL (ref 4.7–6.1)
RBC # BLD AUTO: 2.96 M/UL (ref 4.7–6.1)
RBC # BLD AUTO: 3.11 M/UL (ref 4.7–6.1)
RBC # BLD AUTO: 3.12 M/UL (ref 4.7–6.1)
RBC # FLD: <2000 CELLS/UL
RBC # URNS HPF: ABNORMAL /HPF
RBC BLD AUTO: PRESENT
RBC UR QL AUTO: ABNORMAL
RBC UR QL AUTO: NEGATIVE
RENAL EPI CELLS #/AREA URNS HPF: ABNORMAL /HPF
RETICS # AUTO: 0.07 M/UL (ref 0.04–0.06)
RETICS # AUTO: 0.07 M/UL (ref 0.04–0.06)
RETICS # AUTO: 0.08 M/UL (ref 0.04–0.06)
RETICS/RBC NFR: 2.7 % (ref 0.8–2.1)
RETICS/RBC NFR: 3.2 % (ref 0.8–2.1)
RETICS/RBC NFR: 3.7 % (ref 0.8–2.1)
RH BLD: ABNORMAL
RHODAMINE-AURAMINE STN SPEC: NORMAL
RHODAMINE-AURAMINE STN SPEC: NORMAL
RSV RNA SPEC QL NAA+PROBE: NEGATIVE
RSV RNA SPEC QL NAA+PROBE: NEGATIVE
SAO2 % BLDA: 53.4 % (ref 93–99)
SARS-COV+SARS-COV-2 AG RESP QL IA.RAPID: NOTDETECTED
SARS-COV-2 RNA RESP QL NAA+PROBE: NOTDETECTED
SIGNIFICANT IND 70042: ABNORMAL
SIGNIFICANT IND 70042: ABNORMAL
SIGNIFICANT IND 70042: NORMAL
SITE SITE: ABNORMAL
SITE SITE: ABNORMAL
SITE SITE: NORMAL
SODIUM SERPL-SCNC: 125 MMOL/L (ref 135–145)
SODIUM SERPL-SCNC: 127 MMOL/L (ref 135–145)
SODIUM SERPL-SCNC: 128 MMOL/L (ref 135–145)
SODIUM SERPL-SCNC: 130 MMOL/L (ref 135–145)
SODIUM SERPL-SCNC: 130 MMOL/L (ref 135–145)
SODIUM SERPL-SCNC: 131 MMOL/L (ref 135–145)
SODIUM SERPL-SCNC: 132 MMOL/L (ref 135–145)
SODIUM SERPL-SCNC: 133 MMOL/L (ref 135–145)
SODIUM SERPL-SCNC: 134 MMOL/L (ref 135–145)
SODIUM SERPL-SCNC: 135 MMOL/L (ref 135–145)
SODIUM SERPL-SCNC: 136 MMOL/L (ref 135–145)
SODIUM SERPL-SCNC: 137 MMOL/L (ref 135–145)
SODIUM SERPL-SCNC: 138 MMOL/L (ref 135–145)
SODIUM SERPL-SCNC: 139 MMOL/L (ref 135–145)
SODIUM SERPL-SCNC: 140 MMOL/L (ref 135–145)
SOURCE SOURCE: ABNORMAL
SOURCE SOURCE: ABNORMAL
SOURCE SOURCE: NORMAL
SP GR UR STRIP.AUTO: 1.01
SPECIMEN SOURCE: NORMAL
TEG ALGORITHM TGALG: ABNORMAL
TIBC SERPL-MCNC: 142 UG/DL (ref 250–450)
TIBC SERPL-MCNC: 158 UG/DL (ref 250–450)
TIBC SERPL-MCNC: 187 UG/DL (ref 250–450)
TIBC SERPL-MCNC: 187 UG/DL (ref 250–450)
TRIGL SERPL-MCNC: 156 MG/DL (ref 0–149)
TROPONIN T SERPL-MCNC: 161 NG/L (ref 6–19)
TROPONIN T SERPL-MCNC: 171 NG/L (ref 6–19)
TROPONIN T SERPL-MCNC: 174 NG/L (ref 6–19)
TROPONIN T SERPL-MCNC: 198 NG/L (ref 6–19)
TROPONIN T SERPL-MCNC: 257 NG/L (ref 6–19)
TROPONIN T SERPL-MCNC: 265 NG/L (ref 6–19)
TROPONIN T SERPL-MCNC: 276 NG/L (ref 6–19)
TROPONIN T SERPL-MCNC: 326 NG/L (ref 6–19)
TROPONIN T SERPL-MCNC: 552 NG/L (ref 6–19)
TROPONIN T SERPL-MCNC: 657 NG/L (ref 6–19)
TSH SERPL DL<=0.005 MIU/L-ACNC: 1.9 UIU/ML (ref 0.38–5.33)
TSH SERPL DL<=0.005 MIU/L-ACNC: 2.14 UIU/ML (ref 0.38–5.33)
UIBC SERPL-MCNC: 122 UG/DL (ref 110–370)
UIBC SERPL-MCNC: 150 UG/DL (ref 110–370)
UIBC SERPL-MCNC: 83 UG/DL (ref 110–370)
UIBC SERPL-MCNC: 85 UG/DL (ref 110–370)
UNIT STATUS USTAT: ABNORMAL
UROBILINOGEN UR STRIP.AUTO-MCNC: 0.2 MG/DL
VANCOMYCIN SERPL-MCNC: 17.6 UG/ML
VIT B12 SERPL-MCNC: 504 PG/ML (ref 211–911)
VIT B12 SERPL-MCNC: 571 PG/ML (ref 211–911)
VIT B12 SERPL-MCNC: 852 PG/ML (ref 211–911)
VIT B12 SERPL-MCNC: 889 PG/ML (ref 211–911)
WBC # BLD AUTO: 10 K/UL (ref 4.8–10.8)
WBC # BLD AUTO: 10.1 K/UL (ref 4.8–10.8)
WBC # BLD AUTO: 10.1 K/UL (ref 4.8–10.8)
WBC # BLD AUTO: 10.3 K/UL (ref 4.8–10.8)
WBC # BLD AUTO: 10.9 K/UL (ref 4.8–10.8)
WBC # BLD AUTO: 11 K/UL (ref 4.8–10.8)
WBC # BLD AUTO: 11 K/UL (ref 4.8–10.8)
WBC # BLD AUTO: 11.1 K/UL (ref 4.8–10.8)
WBC # BLD AUTO: 11.6 K/UL (ref 4.8–10.8)
WBC # BLD AUTO: 11.8 K/UL (ref 4.8–10.8)
WBC # BLD AUTO: 12.1 K/UL (ref 4.8–10.8)
WBC # BLD AUTO: 12.3 K/UL (ref 4.8–10.8)
WBC # BLD AUTO: 12.3 K/UL (ref 4.8–10.8)
WBC # BLD AUTO: 12.6 K/UL (ref 4.8–10.8)
WBC # BLD AUTO: 13 K/UL (ref 4.8–10.8)
WBC # BLD AUTO: 13.4 K/UL (ref 4.8–10.8)
WBC # BLD AUTO: 13.5 K/UL (ref 4.8–10.8)
WBC # BLD AUTO: 13.6 K/UL (ref 4.8–10.8)
WBC # BLD AUTO: 13.7 K/UL (ref 4.8–10.8)
WBC # BLD AUTO: 14 K/UL (ref 4.8–10.8)
WBC # BLD AUTO: 14.1 K/UL (ref 4.8–10.8)
WBC # BLD AUTO: 15.5 K/UL (ref 4.8–10.8)
WBC # BLD AUTO: 15.6 K/UL (ref 4.8–10.8)
WBC # BLD AUTO: 15.7 K/UL (ref 4.8–10.8)
WBC # BLD AUTO: 16.5 K/UL (ref 4.8–10.8)
WBC # BLD AUTO: 16.6 K/UL (ref 4.8–10.8)
WBC # BLD AUTO: 17.3 K/UL (ref 4.8–10.8)
WBC # BLD AUTO: 18.2 K/UL (ref 4.8–10.8)
WBC # BLD AUTO: 20.2 K/UL (ref 4.8–10.8)
WBC # BLD AUTO: 5.5 K/UL (ref 4.8–10.8)
WBC # BLD AUTO: 5.5 K/UL (ref 4.8–10.8)
WBC # BLD AUTO: 5.6 K/UL (ref 4.8–10.8)
WBC # BLD AUTO: 5.6 K/UL (ref 4.8–10.8)
WBC # BLD AUTO: 5.7 K/UL (ref 4.8–10.8)
WBC # BLD AUTO: 6.3 K/UL (ref 4.8–10.8)
WBC # BLD AUTO: 6.3 K/UL (ref 4.8–10.8)
WBC # BLD AUTO: 6.4 K/UL (ref 4.8–10.8)
WBC # BLD AUTO: 6.5 K/UL (ref 4.8–10.8)
WBC # BLD AUTO: 6.5 K/UL (ref 4.8–10.8)
WBC # BLD AUTO: 6.6 K/UL (ref 4.8–10.8)
WBC # BLD AUTO: 6.7 K/UL (ref 4.8–10.8)
WBC # BLD AUTO: 6.9 K/UL (ref 4.8–10.8)
WBC # BLD AUTO: 6.9 K/UL (ref 4.8–10.8)
WBC # BLD AUTO: 7 K/UL (ref 4.8–10.8)
WBC # BLD AUTO: 7 K/UL (ref 4.8–10.8)
WBC # BLD AUTO: 7.2 K/UL (ref 4.8–10.8)
WBC # BLD AUTO: 7.2 K/UL (ref 4.8–10.8)
WBC # BLD AUTO: 7.3 K/UL (ref 4.8–10.8)
WBC # BLD AUTO: 7.4 K/UL (ref 4.8–10.8)
WBC # BLD AUTO: 7.4 K/UL (ref 4.8–10.8)
WBC # BLD AUTO: 7.5 K/UL (ref 4.8–10.8)
WBC # BLD AUTO: 7.6 K/UL (ref 4.8–10.8)
WBC # BLD AUTO: 7.7 K/UL (ref 4.8–10.8)
WBC # BLD AUTO: 7.9 K/UL (ref 4.8–10.8)
WBC # BLD AUTO: 7.9 K/UL (ref 4.8–10.8)
WBC # BLD AUTO: 8 K/UL (ref 4.8–10.8)
WBC # BLD AUTO: 8.2 K/UL (ref 4.8–10.8)
WBC # BLD AUTO: 8.3 K/UL (ref 4.8–10.8)
WBC # BLD AUTO: 8.4 K/UL (ref 4.8–10.8)
WBC # BLD AUTO: 8.4 K/UL (ref 4.8–10.8)
WBC # BLD AUTO: 8.6 K/UL (ref 4.8–10.8)
WBC # BLD AUTO: 8.7 K/UL (ref 4.8–10.8)
WBC # BLD AUTO: 8.7 K/UL (ref 4.8–10.8)
WBC # BLD AUTO: 9 K/UL (ref 4.8–10.8)
WBC # BLD AUTO: 9.1 K/UL (ref 4.8–10.8)
WBC # BLD AUTO: 9.2 K/UL (ref 4.8–10.8)
WBC # BLD AUTO: 9.5 K/UL (ref 4.8–10.8)
WBC # BLD AUTO: 9.5 K/UL (ref 4.8–10.8)
WBC # BLD AUTO: 9.6 K/UL (ref 4.8–10.8)
WBC # BLD AUTO: 9.8 K/UL (ref 4.8–10.8)
WBC # BLD AUTO: 9.9 K/UL (ref 4.8–10.8)
WBC # FLD: 211 CELLS/UL
WBC # FLD: 253 CELLS/UL
WBC # FLD: 724 CELLS/UL
WBC #/AREA URNS HPF: ABNORMAL /HPF

## 2021-01-01 PROCEDURE — A9270 NON-COVERED ITEM OR SERVICE: HCPCS | Performed by: STUDENT IN AN ORGANIZED HEALTH CARE EDUCATION/TRAINING PROGRAM

## 2021-01-01 PROCEDURE — 700102 HCHG RX REV CODE 250 W/ 637 OVERRIDE(OP): Performed by: INTERNAL MEDICINE

## 2021-01-01 PROCEDURE — A9270 NON-COVERED ITEM OR SERVICE: HCPCS | Performed by: PHYSICAL MEDICINE & REHABILITATION

## 2021-01-01 PROCEDURE — 36415 COLL VENOUS BLD VENIPUNCTURE: CPT

## 2021-01-01 PROCEDURE — 90935 HEMODIALYSIS ONE EVALUATION: CPT

## 2021-01-01 PROCEDURE — 97535 SELF CARE MNGMENT TRAINING: CPT

## 2021-01-01 PROCEDURE — A9270 NON-COVERED ITEM OR SERVICE: HCPCS | Performed by: INTERNAL MEDICINE

## 2021-01-01 PROCEDURE — 83880 ASSAY OF NATRIURETIC PEPTIDE: CPT

## 2021-01-01 PROCEDURE — 700111 HCHG RX REV CODE 636 W/ 250 OVERRIDE (IP): Performed by: INTERNAL MEDICINE

## 2021-01-01 PROCEDURE — 770001 HCHG ROOM/CARE - MED/SURG/GYN PRIV*

## 2021-01-01 PROCEDURE — 71045 X-RAY EXAM CHEST 1 VIEW: CPT

## 2021-01-01 PROCEDURE — 700111 HCHG RX REV CODE 636 W/ 250 OVERRIDE (IP): Performed by: HOSPITALIST

## 2021-01-01 PROCEDURE — A9270 NON-COVERED ITEM OR SERVICE: HCPCS | Performed by: HOSPITALIST

## 2021-01-01 PROCEDURE — 700102 HCHG RX REV CODE 250 W/ 637 OVERRIDE(OP): Performed by: HOSPITALIST

## 2021-01-01 PROCEDURE — 700111 HCHG RX REV CODE 636 W/ 250 OVERRIDE (IP)

## 2021-01-01 PROCEDURE — 97530 THERAPEUTIC ACTIVITIES: CPT

## 2021-01-01 PROCEDURE — 5A1D70Z PERFORMANCE OF URINARY FILTRATION, INTERMITTENT, LESS THAN 6 HOURS PER DAY: ICD-10-PCS | Performed by: INTERNAL MEDICINE

## 2021-01-01 PROCEDURE — 770020 HCHG ROOM/CARE - TELE (206)

## 2021-01-01 PROCEDURE — 87040 BLOOD CULTURE FOR BACTERIA: CPT

## 2021-01-01 PROCEDURE — 93010 ELECTROCARDIOGRAM REPORT: CPT | Performed by: INTERNAL MEDICINE

## 2021-01-01 PROCEDURE — 99232 SBSQ HOSP IP/OBS MODERATE 35: CPT | Performed by: HOSPITALIST

## 2021-01-01 PROCEDURE — 700111 HCHG RX REV CODE 636 W/ 250 OVERRIDE (IP): Performed by: STUDENT IN AN ORGANIZED HEALTH CARE EDUCATION/TRAINING PROGRAM

## 2021-01-01 PROCEDURE — 700101 HCHG RX REV CODE 250: Performed by: INTERNAL MEDICINE

## 2021-01-01 PROCEDURE — 97530 THERAPEUTIC ACTIVITIES: CPT | Mod: CQ

## 2021-01-01 PROCEDURE — 80053 COMPREHEN METABOLIC PANEL: CPT

## 2021-01-01 PROCEDURE — 700111 HCHG RX REV CODE 636 W/ 250 OVERRIDE (IP): Mod: TB | Performed by: INTERNAL MEDICINE

## 2021-01-01 PROCEDURE — 99232 SBSQ HOSP IP/OBS MODERATE 35: CPT | Mod: GC | Performed by: INTERNAL MEDICINE

## 2021-01-01 PROCEDURE — 83735 ASSAY OF MAGNESIUM: CPT

## 2021-01-01 PROCEDURE — 85027 COMPLETE CBC AUTOMATED: CPT

## 2021-01-01 PROCEDURE — A9270 NON-COVERED ITEM OR SERVICE: HCPCS | Performed by: NURSE PRACTITIONER

## 2021-01-01 PROCEDURE — 87070 CULTURE OTHR SPECIMN AEROBIC: CPT

## 2021-01-01 PROCEDURE — 770010 HCHG ROOM/CARE - REHAB SEMI PRIVAT*

## 2021-01-01 PROCEDURE — 80069 RENAL FUNCTION PANEL: CPT

## 2021-01-01 PROCEDURE — 700102 HCHG RX REV CODE 250 W/ 637 OVERRIDE(OP): Performed by: PHYSICAL MEDICINE & REHABILITATION

## 2021-01-01 PROCEDURE — 85025 COMPLETE CBC W/AUTO DIFF WBC: CPT

## 2021-01-01 PROCEDURE — 87205 SMEAR GRAM STAIN: CPT

## 2021-01-01 PROCEDURE — 700105 HCHG RX REV CODE 258: Performed by: INTERNAL MEDICINE

## 2021-01-01 PROCEDURE — 700102 HCHG RX REV CODE 250 W/ 637 OVERRIDE(OP): Performed by: STUDENT IN AN ORGANIZED HEALTH CARE EDUCATION/TRAINING PROGRAM

## 2021-01-01 PROCEDURE — 84484 ASSAY OF TROPONIN QUANT: CPT

## 2021-01-01 PROCEDURE — 99223 1ST HOSP IP/OBS HIGH 75: CPT | Performed by: PHYSICAL MEDICINE & REHABILITATION

## 2021-01-01 PROCEDURE — 99233 SBSQ HOSP IP/OBS HIGH 50: CPT | Performed by: INTERNAL MEDICINE

## 2021-01-01 PROCEDURE — 5A1D70Z PERFORMANCE OF URINARY FILTRATION, INTERMITTENT, LESS THAN 6 HOURS PER DAY: ICD-10-PCS | Performed by: HOSPITALIST

## 2021-01-01 PROCEDURE — 94664 DEMO&/EVAL PT USE INHALER: CPT | Mod: XU

## 2021-01-01 PROCEDURE — 94760 N-INVAS EAR/PLS OXIMETRY 1: CPT

## 2021-01-01 PROCEDURE — C9803 HOPD COVID-19 SPEC COLLECT: HCPCS | Performed by: EMERGENCY MEDICINE

## 2021-01-01 PROCEDURE — 85384 FIBRINOGEN ACTIVITY: CPT

## 2021-01-01 PROCEDURE — 97110 THERAPEUTIC EXERCISES: CPT

## 2021-01-01 PROCEDURE — 82728 ASSAY OF FERRITIN: CPT

## 2021-01-01 PROCEDURE — 85046 RETICYTE/HGB CONCENTRATE: CPT

## 2021-01-01 PROCEDURE — G0378 HOSPITAL OBSERVATION PER HR: HCPCS

## 2021-01-01 PROCEDURE — 160009 HCHG ANES TIME/MIN: Performed by: ORTHOPAEDIC SURGERY

## 2021-01-01 PROCEDURE — 110454 HCHG SHELL REV 250: Performed by: SURGERY

## 2021-01-01 PROCEDURE — 82962 GLUCOSE BLOOD TEST: CPT

## 2021-01-01 PROCEDURE — 0W993ZZ DRAINAGE OF RIGHT PLEURAL CAVITY, PERCUTANEOUS APPROACH: ICD-10-PCS | Performed by: RADIOLOGY

## 2021-01-01 PROCEDURE — 80048 BASIC METABOLIC PNL TOTAL CA: CPT

## 2021-01-01 PROCEDURE — 93005 ELECTROCARDIOGRAM TRACING: CPT | Performed by: STUDENT IN AN ORGANIZED HEALTH CARE EDUCATION/TRAINING PROGRAM

## 2021-01-01 PROCEDURE — 83036 HEMOGLOBIN GLYCOSYLATED A1C: CPT

## 2021-01-01 PROCEDURE — 93990 DOPPLER FLOW TESTING: CPT

## 2021-01-01 PROCEDURE — 82962 GLUCOSE BLOOD TEST: CPT | Mod: 91

## 2021-01-01 PROCEDURE — 92920 PRQ TRLUML C ANGIOP 1ART&/BR: CPT | Mod: LD

## 2021-01-01 PROCEDURE — 700102 HCHG RX REV CODE 250 W/ 637 OVERRIDE(OP): Performed by: NURSE PRACTITIONER

## 2021-01-01 PROCEDURE — 99232 SBSQ HOSP IP/OBS MODERATE 35: CPT | Performed by: PHYSICIAN ASSISTANT

## 2021-01-01 PROCEDURE — 83540 ASSAY OF IRON: CPT

## 2021-01-01 PROCEDURE — 85347 COAGULATION TIME ACTIVATED: CPT

## 2021-01-01 PROCEDURE — 99231 SBSQ HOSP IP/OBS SF/LOW 25: CPT | Performed by: STUDENT IN AN ORGANIZED HEALTH CARE EDUCATION/TRAINING PROGRAM

## 2021-01-01 PROCEDURE — 700102 HCHG RX REV CODE 250 W/ 637 OVERRIDE(OP): Performed by: FAMILY MEDICINE

## 2021-01-01 PROCEDURE — 90791 PSYCH DIAGNOSTIC EVALUATION: CPT | Performed by: PSYCHOLOGIST

## 2021-01-01 PROCEDURE — 86902 BLOOD TYPE ANTIGEN DONOR EA: CPT | Mod: 91

## 2021-01-01 PROCEDURE — 32555 ASPIRATE PLEURA W/ IMAGING: CPT | Mod: RT

## 2021-01-01 PROCEDURE — 51798 US URINE CAPACITY MEASURE: CPT

## 2021-01-01 PROCEDURE — 85730 THROMBOPLASTIN TIME PARTIAL: CPT

## 2021-01-01 PROCEDURE — 700111 HCHG RX REV CODE 636 W/ 250 OVERRIDE (IP): Performed by: FAMILY MEDICINE

## 2021-01-01 PROCEDURE — 99232 SBSQ HOSP IP/OBS MODERATE 35: CPT | Mod: 25 | Performed by: INTERNAL MEDICINE

## 2021-01-01 PROCEDURE — A9270 NON-COVERED ITEM OR SERVICE: HCPCS | Performed by: FAMILY MEDICINE

## 2021-01-01 PROCEDURE — 99223 1ST HOSP IP/OBS HIGH 75: CPT | Performed by: INTERNAL MEDICINE

## 2021-01-01 PROCEDURE — 84145 PROCALCITONIN (PCT): CPT

## 2021-01-01 PROCEDURE — 92610 EVALUATE SWALLOWING FUNCTION: CPT

## 2021-01-01 PROCEDURE — 700111 HCHG RX REV CODE 636 W/ 250 OVERRIDE (IP): Mod: TB

## 2021-01-01 PROCEDURE — 85007 BL SMEAR W/DIFF WBC COUNT: CPT

## 2021-01-01 PROCEDURE — 88311 DECALCIFY TISSUE: CPT

## 2021-01-01 PROCEDURE — 97162 PT EVAL MOD COMPLEX 30 MIN: CPT

## 2021-01-01 PROCEDURE — 99231 SBSQ HOSP IP/OBS SF/LOW 25: CPT | Performed by: PHYSICAL MEDICINE & REHABILITATION

## 2021-01-01 PROCEDURE — 97130 THER IVNTJ EA ADDL 15 MIN: CPT

## 2021-01-01 PROCEDURE — 83605 ASSAY OF LACTIC ACID: CPT

## 2021-01-01 PROCEDURE — 700105 HCHG RX REV CODE 258: Performed by: STUDENT IN AN ORGANIZED HEALTH CARE EDUCATION/TRAINING PROGRAM

## 2021-01-01 PROCEDURE — 82746 ASSAY OF FOLIC ACID SERUM: CPT

## 2021-01-01 PROCEDURE — 97110 THERAPEUTIC EXERCISES: CPT | Mod: CO

## 2021-01-01 PROCEDURE — 700117 HCHG RX CONTRAST REV CODE 255: Performed by: SURGERY

## 2021-01-01 PROCEDURE — 87147 CULTURE TYPE IMMUNOLOGIC: CPT

## 2021-01-01 PROCEDURE — 99233 SBSQ HOSP IP/OBS HIGH 50: CPT | Performed by: STUDENT IN AN ORGANIZED HEALTH CARE EDUCATION/TRAINING PROGRAM

## 2021-01-01 PROCEDURE — 89051 BODY FLUID CELL COUNT: CPT

## 2021-01-01 PROCEDURE — 160028 HCHG SURGERY MINUTES - 1ST 30 MINS LEVEL 3: Performed by: ORTHOPAEDIC SURGERY

## 2021-01-01 PROCEDURE — 700111 HCHG RX REV CODE 636 W/ 250 OVERRIDE (IP): Performed by: EMERGENCY MEDICINE

## 2021-01-01 PROCEDURE — 97161 PT EVAL LOW COMPLEX 20 MIN: CPT

## 2021-01-01 PROCEDURE — 73218 MRI UPPER EXTREMITY W/O DYE: CPT | Mod: LT,MG

## 2021-01-01 PROCEDURE — 86900 BLOOD TYPING SEROLOGIC ABO: CPT

## 2021-01-01 PROCEDURE — 84100 ASSAY OF PHOSPHORUS: CPT

## 2021-01-01 PROCEDURE — 70450 CT HEAD/BRAIN W/O DYE: CPT

## 2021-01-01 PROCEDURE — 700111 HCHG RX REV CODE 636 W/ 250 OVERRIDE (IP): Mod: JG | Performed by: INTERNAL MEDICINE

## 2021-01-01 PROCEDURE — 97166 OT EVAL MOD COMPLEX 45 MIN: CPT

## 2021-01-01 PROCEDURE — 82607 VITAMIN B-12: CPT

## 2021-01-01 PROCEDURE — 99223 1ST HOSP IP/OBS HIGH 75: CPT | Performed by: HOSPITALIST

## 2021-01-01 PROCEDURE — 99232 SBSQ HOSP IP/OBS MODERATE 35: CPT | Performed by: PHYSICAL MEDICINE & REHABILITATION

## 2021-01-01 PROCEDURE — 99214 OFFICE O/P EST MOD 30 MIN: CPT | Performed by: INTERNAL MEDICINE

## 2021-01-01 PROCEDURE — 700101 HCHG RX REV CODE 250: Performed by: ORTHOPAEDIC SURGERY

## 2021-01-01 PROCEDURE — 87077 CULTURE AEROBIC IDENTIFY: CPT

## 2021-01-01 PROCEDURE — 86901 BLOOD TYPING SEROLOGIC RH(D): CPT

## 2021-01-01 PROCEDURE — 99217 PR OBSERVATION CARE DISCHARGE: CPT | Mod: GC | Performed by: INTERNAL MEDICINE

## 2021-01-01 PROCEDURE — 84484 ASSAY OF TROPONIN QUANT: CPT | Mod: 91

## 2021-01-01 PROCEDURE — 501837 HCHG SUTURE CV: Performed by: SURGERY

## 2021-01-01 PROCEDURE — 94640 AIRWAY INHALATION TREATMENT: CPT

## 2021-01-01 PROCEDURE — 770022 HCHG ROOM/CARE - ICU (200)

## 2021-01-01 PROCEDURE — 99233 SBSQ HOSP IP/OBS HIGH 50: CPT | Performed by: HOSPITALIST

## 2021-01-01 PROCEDURE — 87015 SPECIMEN INFECT AGNT CONCNTJ: CPT | Mod: 91

## 2021-01-01 PROCEDURE — 85610 PROTHROMBIN TIME: CPT

## 2021-01-01 PROCEDURE — 87102 FUNGUS ISOLATION CULTURE: CPT

## 2021-01-01 PROCEDURE — 86140 C-REACTIVE PROTEIN: CPT

## 2021-01-01 PROCEDURE — 97129 THER IVNTJ 1ST 15 MIN: CPT

## 2021-01-01 PROCEDURE — 99233 SBSQ HOSP IP/OBS HIGH 50: CPT | Performed by: FAMILY MEDICINE

## 2021-01-01 PROCEDURE — 770006 HCHG ROOM/CARE - MED/SURG/GYN SEMI*

## 2021-01-01 PROCEDURE — 81001 URINALYSIS AUTO W/SCOPE: CPT

## 2021-01-01 PROCEDURE — 02HV33Z INSERTION OF INFUSION DEVICE INTO SUPERIOR VENA CAVA, PERCUTANEOUS APPROACH: ICD-10-PCS | Performed by: INTERNAL MEDICINE

## 2021-01-01 PROCEDURE — 96374 THER/PROPH/DIAG INJ IV PUSH: CPT

## 2021-01-01 PROCEDURE — 87015 SPECIMEN INFECT AGNT CONCNTJ: CPT

## 2021-01-01 PROCEDURE — 86870 RBC ANTIBODY IDENTIFICATION: CPT

## 2021-01-01 PROCEDURE — 160002 HCHG RECOVERY MINUTES (STAT): Performed by: SURGERY

## 2021-01-01 PROCEDURE — 96365 THER/PROPH/DIAG IV INF INIT: CPT

## 2021-01-01 PROCEDURE — 700111 HCHG RX REV CODE 636 W/ 250 OVERRIDE (IP): Performed by: PHYSICAL MEDICINE & REHABILITATION

## 2021-01-01 PROCEDURE — B51M1ZZ FLUOROSCOPY OF RIGHT UPPER EXTREMITY VEINS USING LOW OSMOLAR CONTRAST: ICD-10-PCS | Performed by: SURGERY

## 2021-01-01 PROCEDURE — 36430 TRANSFUSION BLD/BLD COMPNT: CPT

## 2021-01-01 PROCEDURE — A6223 GAUZE >16<=48 NO W/SAL W/O B: HCPCS | Performed by: ORTHOPAEDIC SURGERY

## 2021-01-01 PROCEDURE — 700105 HCHG RX REV CODE 258: Performed by: HOSPITALIST

## 2021-01-01 PROCEDURE — 99239 HOSP IP/OBS DSCHRG MGMT >30: CPT | Performed by: PHYSICAL MEDICINE & REHABILITATION

## 2021-01-01 PROCEDURE — 97112 NEUROMUSCULAR REEDUCATION: CPT

## 2021-01-01 PROCEDURE — 99232 SBSQ HOSP IP/OBS MODERATE 35: CPT | Performed by: INTERNAL MEDICINE

## 2021-01-01 PROCEDURE — 96372 THER/PROPH/DIAG INJ SC/IM: CPT

## 2021-01-01 PROCEDURE — U0005 INFEC AGEN DETEC AMPLI PROBE: HCPCS

## 2021-01-01 PROCEDURE — P9016 RBC LEUKOCYTES REDUCED: HCPCS

## 2021-01-01 PROCEDURE — 83550 IRON BINDING TEST: CPT

## 2021-01-01 PROCEDURE — 80074 ACUTE HEPATITIS PANEL: CPT

## 2021-01-01 PROCEDURE — 85027 COMPLETE CBC AUTOMATED: CPT | Mod: 91

## 2021-01-01 PROCEDURE — 700105 HCHG RX REV CODE 258: Performed by: ANESTHESIOLOGY

## 2021-01-01 PROCEDURE — 74018 RADEX ABDOMEN 1 VIEW: CPT

## 2021-01-01 PROCEDURE — 302146: Performed by: STUDENT IN AN ORGANIZED HEALTH CARE EDUCATION/TRAINING PROGRAM

## 2021-01-01 PROCEDURE — 87426 SARSCOV CORONAVIRUS AG IA: CPT

## 2021-01-01 PROCEDURE — 36589 REMOVAL TUNNELED CV CATH: CPT

## 2021-01-01 PROCEDURE — 93970 EXTREMITY STUDY: CPT

## 2021-01-01 PROCEDURE — 700111 HCHG RX REV CODE 636 W/ 250 OVERRIDE (IP): Performed by: SURGERY

## 2021-01-01 PROCEDURE — 700105 HCHG RX REV CODE 258: Performed by: FAMILY MEDICINE

## 2021-01-01 PROCEDURE — 93990 DOPPLER FLOW TESTING: CPT | Mod: 26 | Performed by: INTERNAL MEDICINE

## 2021-01-01 PROCEDURE — 86850 RBC ANTIBODY SCREEN: CPT

## 2021-01-01 PROCEDURE — 93926 LOWER EXTREMITY STUDY: CPT | Mod: 26,GZ | Performed by: INTERNAL MEDICINE

## 2021-01-01 PROCEDURE — 87206 SMEAR FLUORESCENT/ACID STAI: CPT

## 2021-01-01 PROCEDURE — 87186 SC STD MICRODIL/AGAR DIL: CPT

## 2021-01-01 PROCEDURE — 99285 EMERGENCY DEPT VISIT HI MDM: CPT

## 2021-01-01 PROCEDURE — 700101 HCHG RX REV CODE 250: Performed by: EMERGENCY MEDICINE

## 2021-01-01 PROCEDURE — 84295 ASSAY OF SERUM SODIUM: CPT

## 2021-01-01 PROCEDURE — 99232 SBSQ HOSP IP/OBS MODERATE 35: CPT | Performed by: NURSE PRACTITIONER

## 2021-01-01 PROCEDURE — 99232 SBSQ HOSP IP/OBS MODERATE 35: CPT | Performed by: FAMILY MEDICINE

## 2021-01-01 PROCEDURE — 97110 THERAPEUTIC EXERCISES: CPT | Mod: CQ

## 2021-01-01 PROCEDURE — 99291 CRITICAL CARE FIRST HOUR: CPT | Performed by: INTERNAL MEDICINE

## 2021-01-01 PROCEDURE — 87040 BLOOD CULTURE FOR BACTERIA: CPT | Mod: 91

## 2021-01-01 PROCEDURE — 93005 ELECTROCARDIOGRAM TRACING: CPT | Performed by: EMERGENCY MEDICINE

## 2021-01-01 PROCEDURE — 94669 MECHANICAL CHEST WALL OSCILL: CPT

## 2021-01-01 PROCEDURE — 73502 X-RAY EXAM HIP UNI 2-3 VIEWS: CPT | Mod: RT

## 2021-01-01 PROCEDURE — 97535 SELF CARE MNGMENT TRAINING: CPT | Mod: CO

## 2021-01-01 PROCEDURE — 93922 UPR/L XTREMITY ART 2 LEVELS: CPT

## 2021-01-01 PROCEDURE — 84132 ASSAY OF SERUM POTASSIUM: CPT

## 2021-01-01 PROCEDURE — 99233 SBSQ HOSP IP/OBS HIGH 50: CPT | Performed by: NURSE PRACTITIONER

## 2021-01-01 PROCEDURE — 86850 RBC ANTIBODY SCREEN: CPT | Mod: 91

## 2021-01-01 PROCEDURE — 92526 ORAL FUNCTION THERAPY: CPT

## 2021-01-01 PROCEDURE — 99233 SBSQ HOSP IP/OBS HIGH 50: CPT | Mod: GC | Performed by: STUDENT IN AN ORGANIZED HEALTH CARE EDUCATION/TRAINING PROGRAM

## 2021-01-01 PROCEDURE — 160028 HCHG SURGERY MINUTES - 1ST 30 MINS LEVEL 3: Performed by: SURGERY

## 2021-01-01 PROCEDURE — 96366 THER/PROPH/DIAG IV INF ADDON: CPT

## 2021-01-01 PROCEDURE — 83615 LACTATE (LD) (LDH) ENZYME: CPT

## 2021-01-01 PROCEDURE — 82150 ASSAY OF AMYLASE: CPT

## 2021-01-01 PROCEDURE — 73130 X-RAY EXAM OF HAND: CPT | Mod: LT

## 2021-01-01 PROCEDURE — 37607 LIG/BANDING ANGIOACS AV FSTL: CPT | Performed by: SURGERY

## 2021-01-01 PROCEDURE — 96376 TX/PRO/DX INJ SAME DRUG ADON: CPT

## 2021-01-01 PROCEDURE — 501838 HCHG SUTURE GENERAL: Performed by: ORTHOPAEDIC SURGERY

## 2021-01-01 PROCEDURE — 71046 X-RAY EXAM CHEST 2 VIEWS: CPT

## 2021-01-01 PROCEDURE — 05LF0ZZ OCCLUSION OF LEFT CEPHALIC VEIN, OPEN APPROACH: ICD-10-PCS | Performed by: SURGERY

## 2021-01-01 PROCEDURE — 97116 GAIT TRAINING THERAPY: CPT

## 2021-01-01 PROCEDURE — 84157 ASSAY OF PROTEIN OTHER: CPT

## 2021-01-01 PROCEDURE — 0W993ZX DRAINAGE OF RIGHT PLEURAL CAVITY, PERCUTANEOUS APPROACH, DIAGNOSTIC: ICD-10-PCS | Performed by: RADIOLOGY

## 2021-01-01 PROCEDURE — 99233 SBSQ HOSP IP/OBS HIGH 50: CPT | Performed by: PHYSICAL MEDICINE & REHABILITATION

## 2021-01-01 PROCEDURE — 700101 HCHG RX REV CODE 250: Performed by: STUDENT IN AN ORGANIZED HEALTH CARE EDUCATION/TRAINING PROGRAM

## 2021-01-01 PROCEDURE — 99239 HOSP IP/OBS DSCHRG MGMT >30: CPT | Performed by: STUDENT IN AN ORGANIZED HEALTH CARE EDUCATION/TRAINING PROGRAM

## 2021-01-01 PROCEDURE — 99233 SBSQ HOSP IP/OBS HIGH 50: CPT | Mod: GC | Performed by: INTERNAL MEDICINE

## 2021-01-01 PROCEDURE — C9803 HOPD COVID-19 SPEC COLLECT: HCPCS | Performed by: PHYSICAL MEDICINE & REHABILITATION

## 2021-01-01 PROCEDURE — 700111 HCHG RX REV CODE 636 W/ 250 OVERRIDE (IP): Performed by: NURSE PRACTITIONER

## 2021-01-01 PROCEDURE — U0003 INFECTIOUS AGENT DETECTION BY NUCLEIC ACID (DNA OR RNA); SEVERE ACUTE RESPIRATORY SYNDROME CORONAVIRUS 2 (SARS-COV-2) (CORONAVIRUS DISEASE [COVID-19]), AMPLIFIED PROBE TECHNIQUE, MAKING USE OF HIGH THROUGHPUT TECHNOLOGIES AS DESCRIBED BY CMS-2020-01-R: HCPCS

## 2021-01-01 PROCEDURE — 87502 INFLUENZA DNA AMP PROBE: CPT

## 2021-01-01 PROCEDURE — 87641 MR-STAPH DNA AMP PROBE: CPT

## 2021-01-01 PROCEDURE — 302146: Performed by: FAMILY MEDICINE

## 2021-01-01 PROCEDURE — 93306 TTE W/DOPPLER COMPLETE: CPT | Mod: 26 | Performed by: INTERNAL MEDICINE

## 2021-01-01 PROCEDURE — 700101 HCHG RX REV CODE 250: Performed by: HOSPITALIST

## 2021-01-01 PROCEDURE — 82306 VITAMIN D 25 HYDROXY: CPT

## 2021-01-01 PROCEDURE — 4410569 US-THORACENTESIS PUNCTURE

## 2021-01-01 PROCEDURE — 87075 CULTR BACTERIA EXCEPT BLOOD: CPT

## 2021-01-01 PROCEDURE — 97130 THER IVNTJ EA ADDL 15 MIN: CPT | Performed by: SPEECH-LANGUAGE PATHOLOGIST

## 2021-01-01 PROCEDURE — 77001 FLUOROGUIDE FOR VEIN DEVICE: CPT | Mod: 26 | Performed by: SURGERY

## 2021-01-01 PROCEDURE — 93306 TTE W/DOPPLER COMPLETE: CPT

## 2021-01-01 PROCEDURE — 97163 PT EVAL HIGH COMPLEX 45 MIN: CPT

## 2021-01-01 PROCEDURE — 86880 COOMBS TEST DIRECT: CPT | Mod: 91

## 2021-01-01 PROCEDURE — 99291 CRITICAL CARE FIRST HOUR: CPT | Performed by: EMERGENCY MEDICINE

## 2021-01-01 PROCEDURE — 160048 HCHG OR STATISTICAL LEVEL 1-5: Performed by: SURGERY

## 2021-01-01 PROCEDURE — 99232 SBSQ HOSP IP/OBS MODERATE 35: CPT | Performed by: STUDENT IN AN ORGANIZED HEALTH CARE EDUCATION/TRAINING PROGRAM

## 2021-01-01 PROCEDURE — 03L80ZZ OCCLUSION OF LEFT BRACHIAL ARTERY, OPEN APPROACH: ICD-10-PCS | Performed by: SURGERY

## 2021-01-01 PROCEDURE — 93990 DOPPLER FLOW TESTING: CPT | Mod: 26,GZ | Performed by: INTERNAL MEDICINE

## 2021-01-01 PROCEDURE — 86906 BLD TYPING SEROLOGIC RH PHNT: CPT

## 2021-01-01 PROCEDURE — C1887 CATHETER, GUIDING: HCPCS

## 2021-01-01 PROCEDURE — 3E043XZ INTRODUCTION OF VASOPRESSOR INTO CENTRAL VEIN, PERCUTANEOUS APPROACH: ICD-10-PCS | Performed by: INTERNAL MEDICINE

## 2021-01-01 PROCEDURE — 83605 ASSAY OF LACTIC ACID: CPT | Mod: 91

## 2021-01-01 PROCEDURE — 87116 MYCOBACTERIA CULTURE: CPT

## 2021-01-01 PROCEDURE — 92950 HEART/LUNG RESUSCITATION CPR: CPT

## 2021-01-01 PROCEDURE — 700101 HCHG RX REV CODE 250: Performed by: SURGERY

## 2021-01-01 PROCEDURE — 73130 X-RAY EXAM OF HAND: CPT | Mod: RT

## 2021-01-01 PROCEDURE — 02HV33Z INSERTION OF INFUSION DEVICE INTO SUPERIOR VENA CAVA, PERCUTANEOUS APPROACH: ICD-10-PCS | Performed by: SURGERY

## 2021-01-01 PROCEDURE — 99233 SBSQ HOSP IP/OBS HIGH 50: CPT | Mod: AI | Performed by: INTERNAL MEDICINE

## 2021-01-01 PROCEDURE — 5A1D70Z PERFORMANCE OF URINARY FILTRATION, INTERMITTENT, LESS THAN 6 HOURS PER DAY: ICD-10-PCS | Performed by: STUDENT IN AN ORGANIZED HEALTH CARE EDUCATION/TRAINING PROGRAM

## 2021-01-01 PROCEDURE — 93005 ELECTROCARDIOGRAM TRACING: CPT | Performed by: INTERNAL MEDICINE

## 2021-01-01 PROCEDURE — 99223 1ST HOSP IP/OBS HIGH 75: CPT | Mod: AI | Performed by: HOSPITALIST

## 2021-01-01 PROCEDURE — 72170 X-RAY EXAM OF PELVIS: CPT

## 2021-01-01 PROCEDURE — 700105 HCHG RX REV CODE 258: Performed by: EMERGENCY MEDICINE

## 2021-01-01 PROCEDURE — C1729 CATH, DRAINAGE: HCPCS

## 2021-01-01 PROCEDURE — 86922 COMPATIBILITY TEST ANTIGLOB: CPT | Mod: 91

## 2021-01-01 PROCEDURE — 700111 HCHG RX REV CODE 636 W/ 250 OVERRIDE (IP): Performed by: ANESTHESIOLOGY

## 2021-01-01 PROCEDURE — 307059 PAD,EAR PROTECTOR: Performed by: INTERNAL MEDICINE

## 2021-01-01 PROCEDURE — 0241U HCHG SARS-COV-2 COVID-19 NFCT DS RESP RNA 4 TRGT MIC: CPT

## 2021-01-01 PROCEDURE — 86922 COMPATIBILITY TEST ANTIGLOB: CPT

## 2021-01-01 PROCEDURE — 82272 OCCULT BLD FECES 1-3 TESTS: CPT

## 2021-01-01 PROCEDURE — 99221 1ST HOSP IP/OBS SF/LOW 40: CPT | Performed by: NURSE PRACTITIONER

## 2021-01-01 PROCEDURE — 80202 ASSAY OF VANCOMYCIN: CPT

## 2021-01-01 PROCEDURE — 93990 DOPPLER FLOW TESTING: CPT | Mod: 26,GZ | Performed by: SURGERY

## 2021-01-01 PROCEDURE — B2111ZZ FLUOROSCOPY OF MULTIPLE CORONARY ARTERIES USING LOW OSMOLAR CONTRAST: ICD-10-PCS | Performed by: INTERNAL MEDICINE

## 2021-01-01 PROCEDURE — 82945 GLUCOSE OTHER FLUID: CPT

## 2021-01-01 PROCEDURE — 30233N1 TRANSFUSION OF NONAUTOLOGOUS RED BLOOD CELLS INTO PERIPHERAL VEIN, PERCUTANEOUS APPROACH: ICD-10-PCS | Performed by: INTERNAL MEDICINE

## 2021-01-01 PROCEDURE — 99221 1ST HOSP IP/OBS SF/LOW 40: CPT | Mod: 57 | Performed by: SURGERY

## 2021-01-01 PROCEDURE — 85018 HEMOGLOBIN: CPT

## 2021-01-01 PROCEDURE — 92612 ENDOSCOPY SWALLOW (FEES) VID: CPT

## 2021-01-01 PROCEDURE — 501838 HCHG SUTURE GENERAL: Performed by: SURGERY

## 2021-01-01 PROCEDURE — 36556 INSERT NON-TUNNEL CV CATH: CPT | Mod: LT | Performed by: INTERNAL MEDICINE

## 2021-01-01 PROCEDURE — 4A023N7 MEASUREMENT OF CARDIAC SAMPLING AND PRESSURE, LEFT HEART, PERCUTANEOUS APPROACH: ICD-10-PCS | Performed by: INTERNAL MEDICINE

## 2021-01-01 PROCEDURE — 160036 HCHG PACU - EA ADDL 30 MINS PHASE I: Performed by: ORTHOPAEDIC SURGERY

## 2021-01-01 PROCEDURE — 86999 UNLISTED TRANSFUSION MED PX: CPT

## 2021-01-01 PROCEDURE — 87086 URINE CULTURE/COLONY COUNT: CPT

## 2021-01-01 PROCEDURE — 99232 SBSQ HOSP IP/OBS MODERATE 35: CPT | Mod: GC | Performed by: STUDENT IN AN ORGANIZED HEALTH CARE EDUCATION/TRAINING PROGRAM

## 2021-01-01 PROCEDURE — 84520 ASSAY OF UREA NITROGEN: CPT

## 2021-01-01 PROCEDURE — 500367 HCHG DRAIN KIT, HEMOVAC: Performed by: ORTHOPAEDIC SURGERY

## 2021-01-01 PROCEDURE — 88305 TISSUE EXAM BY PATHOLOGIST: CPT

## 2021-01-01 PROCEDURE — 82533 TOTAL CORTISOL: CPT

## 2021-01-01 PROCEDURE — 160048 HCHG OR STATISTICAL LEVEL 1-5: Performed by: ORTHOPAEDIC SURGERY

## 2021-01-01 PROCEDURE — 83986 ASSAY PH BODY FLUID NOS: CPT

## 2021-01-01 PROCEDURE — 86706 HEP B SURFACE ANTIBODY: CPT

## 2021-01-01 PROCEDURE — 99239 HOSP IP/OBS DSCHRG MGMT >30: CPT | Performed by: INTERNAL MEDICINE

## 2021-01-01 PROCEDURE — 302146: Performed by: INTERNAL MEDICINE

## 2021-01-01 PROCEDURE — 99239 HOSP IP/OBS DSCHRG MGMT >30: CPT | Mod: GC | Performed by: HOSPITALIST

## 2021-01-01 PROCEDURE — 99220 PR INITIAL OBSERVATION CARE,LEVL III: CPT | Mod: GC | Performed by: INTERNAL MEDICINE

## 2021-01-01 PROCEDURE — 99223 1ST HOSP IP/OBS HIGH 75: CPT | Mod: AI | Performed by: PHYSICAL MEDICINE & REHABILITATION

## 2021-01-01 PROCEDURE — B518ZZA FLUOROSCOPY OF SUPERIOR VENA CAVA, GUIDANCE: ICD-10-PCS | Performed by: SURGERY

## 2021-01-01 PROCEDURE — 160039 HCHG SURGERY MINUTES - EA ADDL 1 MIN LEVEL 3: Performed by: ORTHOPAEDIC SURGERY

## 2021-01-01 PROCEDURE — 93925 LOWER EXTREMITY STUDY: CPT

## 2021-01-01 PROCEDURE — 93922 UPR/L XTREMITY ART 2 LEVELS: CPT | Mod: 26,GZ | Performed by: INTERNAL MEDICINE

## 2021-01-01 PROCEDURE — 86880 COOMBS TEST DIRECT: CPT

## 2021-01-01 PROCEDURE — 160035 HCHG PACU - 1ST 60 MINS PHASE I: Performed by: ORTHOPAEDIC SURGERY

## 2021-01-01 PROCEDURE — 73620 X-RAY EXAM OF FOOT: CPT | Mod: LT

## 2021-01-01 PROCEDURE — 700102 HCHG RX REV CODE 250 W/ 637 OVERRIDE(OP): Performed by: ANESTHESIOLOGY

## 2021-01-01 PROCEDURE — 93005 ELECTROCARDIOGRAM TRACING: CPT

## 2021-01-01 PROCEDURE — C1894 INTRO/SHEATH, NON-LASER: HCPCS | Performed by: SURGERY

## 2021-01-01 PROCEDURE — 94760 N-INVAS EAR/PLS OXIMETRY 1: CPT | Mod: XU

## 2021-01-01 PROCEDURE — 160002 HCHG RECOVERY MINUTES (STAT): Performed by: ORTHOPAEDIC SURGERY

## 2021-01-01 PROCEDURE — 80061 LIPID PANEL: CPT

## 2021-01-01 PROCEDURE — 05PYX3Z REMOVAL OF INFUSION DEVICE FROM UPPER VEIN, EXTERNAL APPROACH: ICD-10-PCS | Performed by: RADIOLOGY

## 2021-01-01 PROCEDURE — 99231 SBSQ HOSP IP/OBS SF/LOW 25: CPT | Performed by: HOSPITALIST

## 2021-01-01 PROCEDURE — 99223 1ST HOSP IP/OBS HIGH 75: CPT | Mod: AI | Performed by: INTERNAL MEDICINE

## 2021-01-01 PROCEDURE — 02703ZZ DILATION OF CORONARY ARTERY, ONE ARTERY, PERCUTANEOUS APPROACH: ICD-10-PCS | Performed by: INTERNAL MEDICINE

## 2021-01-01 PROCEDURE — 700117 HCHG RX CONTRAST REV CODE 255: Performed by: INTERNAL MEDICINE

## 2021-01-01 PROCEDURE — B548ZZA ULTRASONOGRAPHY OF SUPERIOR VENA CAVA, GUIDANCE: ICD-10-PCS | Performed by: INTERNAL MEDICINE

## 2021-01-01 PROCEDURE — 97129 THER IVNTJ 1ST 15 MIN: CPT | Performed by: SPEECH-LANGUAGE PATHOLOGIST

## 2021-01-01 PROCEDURE — 36558 INSERT TUNNELED CV CATH: CPT | Performed by: SURGERY

## 2021-01-01 PROCEDURE — 83615 LACTATE (LD) (LDH) ENZYME: CPT | Mod: 91

## 2021-01-01 PROCEDURE — 99222 1ST HOSP IP/OBS MODERATE 55: CPT | Performed by: NURSE PRACTITIONER

## 2021-01-01 PROCEDURE — 0Y6J0Z2 DETACHMENT AT LEFT LOWER LEG, MID, OPEN APPROACH: ICD-10-PCS | Performed by: ORTHOPAEDIC SURGERY

## 2021-01-01 PROCEDURE — 92523 SPEECH SOUND LANG COMPREHEN: CPT

## 2021-01-01 PROCEDURE — 700111 HCHG RX REV CODE 636 W/ 250 OVERRIDE (IP): Mod: TB | Performed by: NURSE PRACTITIONER

## 2021-01-01 PROCEDURE — 84443 ASSAY THYROID STIM HORMONE: CPT

## 2021-01-01 PROCEDURE — 30233N1 TRANSFUSION OF NONAUTOLOGOUS RED BLOOD CELLS INTO PERIPHERAL VEIN, PERCUTANEOUS APPROACH: ICD-10-PCS | Performed by: FAMILY MEDICINE

## 2021-01-01 PROCEDURE — C1750 CATH, HEMODIALYSIS,LONG-TERM: HCPCS | Performed by: SURGERY

## 2021-01-01 PROCEDURE — 83970 ASSAY OF PARATHORMONE: CPT

## 2021-01-01 PROCEDURE — 92920 PRQ TRLUML C ANGIOP 1ART&/BR: CPT | Mod: RC | Performed by: INTERNAL MEDICINE

## 2021-01-01 PROCEDURE — 500881 HCHG PACK, EXTREMITY: Performed by: ORTHOPAEDIC SURGERY

## 2021-01-01 PROCEDURE — 82803 BLOOD GASES ANY COMBINATION: CPT

## 2021-01-01 PROCEDURE — 700101 HCHG RX REV CODE 250: Performed by: PHYSICAL MEDICINE & REHABILITATION

## 2021-01-01 PROCEDURE — A9270 NON-COVERED ITEM OR SERVICE: HCPCS | Performed by: ANESTHESIOLOGY

## 2021-01-01 PROCEDURE — 85576 BLOOD PLATELET AGGREGATION: CPT | Mod: 91

## 2021-01-01 PROCEDURE — 99152 MOD SED SAME PHYS/QHP 5/>YRS: CPT | Performed by: INTERNAL MEDICINE

## 2021-01-01 PROCEDURE — 72192 CT PELVIS W/O DYE: CPT | Mod: MG

## 2021-01-01 PROCEDURE — 700101 HCHG RX REV CODE 250

## 2021-01-01 PROCEDURE — 31500 INSERT EMERGENCY AIRWAY: CPT

## 2021-01-01 PROCEDURE — 160009 HCHG ANES TIME/MIN: Performed by: SURGERY

## 2021-01-01 PROCEDURE — 83010 ASSAY OF HAPTOGLOBIN QUANT: CPT

## 2021-01-01 PROCEDURE — 88112 CYTOPATH CELL ENHANCE TECH: CPT

## 2021-01-01 PROCEDURE — 97116 GAIT TRAINING THERAPY: CPT | Mod: CQ

## 2021-01-01 PROCEDURE — 160035 HCHG PACU - 1ST 60 MINS PHASE I: Performed by: SURGERY

## 2021-01-01 PROCEDURE — 93005 ELECTROCARDIOGRAM TRACING: CPT | Performed by: FAMILY MEDICINE

## 2021-01-01 PROCEDURE — 99291 CRITICAL CARE FIRST HOUR: CPT | Performed by: STUDENT IN AN ORGANIZED HEALTH CARE EDUCATION/TRAINING PROGRAM

## 2021-01-01 PROCEDURE — 160039 HCHG SURGERY MINUTES - EA ADDL 1 MIN LEVEL 3: Performed by: SURGERY

## 2021-01-01 PROCEDURE — 82270 OCCULT BLOOD FECES: CPT

## 2021-01-01 PROCEDURE — 96368 THER/DIAG CONCURRENT INF: CPT

## 2021-01-01 PROCEDURE — 84311 SPECTROPHOTOMETRY: CPT

## 2021-01-01 PROCEDURE — 93458 L HRT ARTERY/VENTRICLE ANGIO: CPT | Mod: 26,59 | Performed by: INTERNAL MEDICINE

## 2021-01-01 PROCEDURE — 94660 CPAP INITIATION&MGMT: CPT

## 2021-01-01 PROCEDURE — B2151ZZ FLUOROSCOPY OF LEFT HEART USING LOW OSMOLAR CONTRAST: ICD-10-PCS | Performed by: INTERNAL MEDICINE

## 2021-01-01 PROCEDURE — 88307 TISSUE EXAM BY PATHOLOGIST: CPT

## 2021-01-01 DEVICE — CATHETER HEMOSPLIT 23CM (5EA/CA): Type: IMPLANTABLE DEVICE | Site: CHEST | Status: FUNCTIONAL

## 2021-01-01 RX ORDER — OXYCODONE HYDROCHLORIDE 5 MG/1
5 TABLET ORAL EVERY 4 HOURS PRN
Status: CANCELLED | OUTPATIENT
Start: 2021-01-01

## 2021-01-01 RX ORDER — CARVEDILOL 3.12 MG/1
3.12 TABLET ORAL 2 TIMES DAILY
Status: ON HOLD | COMMUNITY
End: 2021-01-01

## 2021-01-01 RX ORDER — ACETAMINOPHEN 500 MG
1000 TABLET ORAL 3 TIMES DAILY
Status: DISCONTINUED | OUTPATIENT
Start: 2021-01-01 | End: 2021-01-01

## 2021-01-01 RX ORDER — HEPARIN SODIUM 1000 [USP'U]/ML
INJECTION, SOLUTION INTRAVENOUS; SUBCUTANEOUS
Status: COMPLETED
Start: 2021-01-01 | End: 2021-01-01

## 2021-01-01 RX ORDER — OXYCODONE HYDROCHLORIDE 5 MG/1
5 TABLET ORAL EVERY 4 HOURS PRN
Status: DISCONTINUED | OUTPATIENT
Start: 2021-01-01 | End: 2021-01-01 | Stop reason: HOSPADM

## 2021-01-01 RX ORDER — CARVEDILOL 3.12 MG/1
3.12 TABLET ORAL 2 TIMES DAILY WITH MEALS
Status: DISCONTINUED | OUTPATIENT
Start: 2021-01-01 | End: 2021-01-01

## 2021-01-01 RX ORDER — TRAZODONE HYDROCHLORIDE 50 MG/1
50 TABLET ORAL
Status: DISCONTINUED | OUTPATIENT
Start: 2021-01-01 | End: 2021-01-01 | Stop reason: HOSPADM

## 2021-01-01 RX ORDER — LIDOCAINE HYDROCHLORIDE 10 MG/ML
INJECTION, SOLUTION INFILTRATION; PERINEURAL
Status: ACTIVE
Start: 2021-01-01 | End: 2021-01-01

## 2021-01-01 RX ORDER — LIDOCAINE HYDROCHLORIDE 20 MG/ML
JELLY TOPICAL PRN
Status: COMPLETED | OUTPATIENT
Start: 2021-01-01 | End: 2021-01-01

## 2021-01-01 RX ORDER — SEVELAMER CARBONATE 800 MG/1
1600 TABLET, FILM COATED ORAL
Qty: 90 TABLET | Refills: 0 | Status: SHIPPED | OUTPATIENT
Start: 2021-01-01

## 2021-01-01 RX ORDER — OMEPRAZOLE 20 MG/1
20 CAPSULE, DELAYED RELEASE ORAL 2 TIMES DAILY
Qty: 30 CAPSULE | Refills: 0 | Status: SHIPPED | OUTPATIENT
Start: 2021-01-01

## 2021-01-01 RX ORDER — METHIMAZOLE 5 MG/1
5 TABLET ORAL 2 TIMES DAILY
Status: DISCONTINUED | OUTPATIENT
Start: 2021-01-01 | End: 2021-01-01 | Stop reason: HOSPADM

## 2021-01-01 RX ORDER — ONDANSETRON 4 MG/1
4 TABLET, ORALLY DISINTEGRATING ORAL EVERY 4 HOURS PRN
Status: DISCONTINUED | OUTPATIENT
Start: 2021-01-01 | End: 2021-01-01 | Stop reason: HOSPADM

## 2021-01-01 RX ORDER — METHYLPREDNISOLONE 4 MG/1
4-20 TABLET ORAL DAILY
Status: ON HOLD | COMMUNITY
Start: 2021-01-01 | End: 2021-01-01

## 2021-01-01 RX ORDER — SODIUM CHLORIDE 9 MG/ML
INJECTION, SOLUTION INTRAVENOUS CONTINUOUS
Status: ACTIVE | OUTPATIENT
Start: 2021-01-01 | End: 2021-01-01

## 2021-01-01 RX ORDER — LIDOCAINE HYDROCHLORIDE 20 MG/ML
INJECTION, SOLUTION INFILTRATION; PERINEURAL
Status: COMPLETED
Start: 2021-01-01 | End: 2021-01-01

## 2021-01-01 RX ORDER — SEVELAMER CARBONATE 800 MG/1
1600 TABLET, FILM COATED ORAL
Status: DISCONTINUED | OUTPATIENT
Start: 2021-01-01 | End: 2021-01-01

## 2021-01-01 RX ORDER — CALCITRIOL 1 UG/ML
0.25 SOLUTION ORAL DAILY
Status: DISCONTINUED | OUTPATIENT
Start: 2021-01-01 | End: 2021-01-01

## 2021-01-01 RX ORDER — OXYCODONE HCL 5 MG/5 ML
5 SOLUTION, ORAL ORAL
Status: COMPLETED | OUTPATIENT
Start: 2021-01-01 | End: 2021-01-01

## 2021-01-01 RX ORDER — OXYCODONE HYDROCHLORIDE AND ACETAMINOPHEN 5; 325 MG/1; MG/1
1 TABLET ORAL EVERY 4 HOURS PRN
Status: DISCONTINUED | OUTPATIENT
Start: 2021-01-01 | End: 2021-01-01 | Stop reason: HOSPADM

## 2021-01-01 RX ORDER — ACETAMINOPHEN 325 MG/1
650 TABLET ORAL EVERY 4 HOURS PRN
Status: DISCONTINUED | OUTPATIENT
Start: 2021-01-01 | End: 2021-01-01 | Stop reason: HOSPADM

## 2021-01-01 RX ORDER — TRAMADOL HYDROCHLORIDE 50 MG/1
50 TABLET ORAL EVERY 8 HOURS PRN
Status: DISCONTINUED | OUTPATIENT
Start: 2021-01-01 | End: 2021-01-01 | Stop reason: HOSPADM

## 2021-01-01 RX ORDER — GUAIFENESIN/DEXTROMETHORPHAN 100-10MG/5
10 SYRUP ORAL EVERY 6 HOURS PRN
Status: DISCONTINUED | OUTPATIENT
Start: 2021-01-01 | End: 2021-01-01

## 2021-01-01 RX ORDER — METHYLPREDNISOLONE 4 MG/1
4 TABLET ORAL 2 TIMES DAILY
Status: ON HOLD | COMMUNITY
Start: 2021-01-01 | End: 2021-01-01

## 2021-01-01 RX ORDER — GUAIFENESIN 600 MG/1
600 TABLET, EXTENDED RELEASE ORAL EVERY 8 HOURS PRN
Status: DISCONTINUED | OUTPATIENT
Start: 2021-01-01 | End: 2021-01-01

## 2021-01-01 RX ORDER — MICONAZOLE NITRATE 20 MG/G
CREAM TOPICAL
Status: DISPENSED | OUTPATIENT
Start: 2021-01-01 | End: 2021-01-01

## 2021-01-01 RX ORDER — MIDODRINE HYDROCHLORIDE 5 MG/1
10 TABLET ORAL
Status: DISCONTINUED | OUTPATIENT
Start: 2021-01-01 | End: 2021-01-01

## 2021-01-01 RX ORDER — CLOPIDOGREL BISULFATE 75 MG/1
75 TABLET ORAL DAILY
Status: DISCONTINUED | OUTPATIENT
Start: 2021-01-01 | End: 2021-01-01 | Stop reason: HOSPADM

## 2021-01-01 RX ORDER — ATORVASTATIN CALCIUM 40 MG/1
40 TABLET, FILM COATED ORAL EVERY EVENING
Status: CANCELLED | OUTPATIENT
Start: 2021-01-01

## 2021-01-01 RX ORDER — NITROGLYCERIN 0.4 MG/1
0.4 TABLET SUBLINGUAL
Status: DISCONTINUED | OUTPATIENT
Start: 2021-01-01 | End: 2021-01-01 | Stop reason: HOSPADM

## 2021-01-01 RX ORDER — DEXTROSE MONOHYDRATE 25 G/50ML
50 INJECTION, SOLUTION INTRAVENOUS ONCE
Status: COMPLETED | OUTPATIENT
Start: 2021-01-01 | End: 2021-01-01

## 2021-01-01 RX ORDER — HYDROMORPHONE HYDROCHLORIDE 1 MG/ML
0.5 INJECTION, SOLUTION INTRAMUSCULAR; INTRAVENOUS; SUBCUTANEOUS EVERY 4 HOURS PRN
Status: DISCONTINUED | OUTPATIENT
Start: 2021-01-01 | End: 2021-01-01

## 2021-01-01 RX ORDER — CARVEDILOL 3.12 MG/1
3.12 TABLET ORAL 2 TIMES DAILY WITH MEALS
Qty: 60 TABLET | Refills: 0 | Status: ON HOLD | OUTPATIENT
Start: 2021-01-01 | End: 2021-01-01

## 2021-01-01 RX ORDER — TAMSULOSIN HYDROCHLORIDE 0.4 MG/1
0.4 CAPSULE ORAL
Status: DISCONTINUED | OUTPATIENT
Start: 2021-01-01 | End: 2021-01-01

## 2021-01-01 RX ORDER — SODIUM CHLORIDE 9 MG/ML
1000 INJECTION, SOLUTION INTRAVENOUS ONCE
Status: DISCONTINUED | OUTPATIENT
Start: 2021-01-01 | End: 2021-01-01

## 2021-01-01 RX ORDER — INSULIN LISPRO 100 [IU]/ML
0.12 INJECTION, SOLUTION INTRAVENOUS; SUBCUTANEOUS
Status: DISCONTINUED | OUTPATIENT
Start: 2021-01-01 | End: 2021-01-01 | Stop reason: HOSPADM

## 2021-01-01 RX ORDER — SEVELAMER CARBONATE 800 MG/1
1600 TABLET, FILM COATED ORAL
Status: DISCONTINUED | OUTPATIENT
Start: 2021-01-01 | End: 2021-01-01 | Stop reason: HOSPADM

## 2021-01-01 RX ORDER — BISACODYL 10 MG
10 SUPPOSITORY, RECTAL RECTAL
Status: DISCONTINUED | OUTPATIENT
Start: 2021-01-01 | End: 2021-01-01 | Stop reason: HOSPADM

## 2021-01-01 RX ORDER — OXYCODONE HYDROCHLORIDE 5 MG/1
5 TABLET ORAL EVERY 4 HOURS PRN
Status: DISCONTINUED | OUTPATIENT
Start: 2021-01-01 | End: 2021-01-01

## 2021-01-01 RX ORDER — MORPHINE SULFATE 4 MG/ML
2-4 INJECTION, SOLUTION INTRAMUSCULAR; INTRAVENOUS
Status: DISCONTINUED | OUTPATIENT
Start: 2021-01-01 | End: 2021-01-01 | Stop reason: HOSPADM

## 2021-01-01 RX ORDER — HYDROXYZINE HYDROCHLORIDE 25 MG/1
25 TABLET, FILM COATED ORAL 3 TIMES DAILY PRN
Status: DISCONTINUED | OUTPATIENT
Start: 2021-01-01 | End: 2021-01-01

## 2021-01-01 RX ORDER — GABAPENTIN 300 MG/1
300 CAPSULE ORAL DAILY
Status: DISCONTINUED | OUTPATIENT
Start: 2021-01-01 | End: 2021-01-01 | Stop reason: HOSPADM

## 2021-01-01 RX ORDER — HEPARIN SODIUM,PORCINE 1000/ML
VIAL (ML) INJECTION
Status: DISCONTINUED | OUTPATIENT
Start: 2021-01-01 | End: 2021-01-01 | Stop reason: HOSPADM

## 2021-01-01 RX ORDER — INSULIN GLARGINE 100 [IU]/ML
8 INJECTION, SOLUTION SUBCUTANEOUS EVERY MORNING
Status: ON HOLD
Start: 2021-01-01 | End: 2021-01-01 | Stop reason: SDUPTHER

## 2021-01-01 RX ORDER — SODIUM CHLORIDE 9 MG/ML
250 INJECTION, SOLUTION INTRAVENOUS ONCE
Status: COMPLETED | OUTPATIENT
Start: 2021-01-01 | End: 2021-01-01

## 2021-01-01 RX ORDER — TAMSULOSIN HYDROCHLORIDE 0.4 MG/1
0.8 CAPSULE ORAL
Status: DISCONTINUED | OUTPATIENT
Start: 2021-01-01 | End: 2021-01-01 | Stop reason: HOSPADM

## 2021-01-01 RX ORDER — SODIUM CHLORIDE 9 MG/ML
INJECTION, SOLUTION INTRAVENOUS CONTINUOUS
Status: DISCONTINUED | OUTPATIENT
Start: 2021-01-01 | End: 2021-01-01

## 2021-01-01 RX ORDER — AMOXICILLIN 250 MG
2 CAPSULE ORAL 2 TIMES DAILY
Status: DISCONTINUED | OUTPATIENT
Start: 2021-01-01 | End: 2021-01-01 | Stop reason: HOSPADM

## 2021-01-01 RX ORDER — BUDESONIDE AND FORMOTEROL FUMARATE DIHYDRATE 160; 4.5 UG/1; UG/1
2 AEROSOL RESPIRATORY (INHALATION)
Status: DISCONTINUED | OUTPATIENT
Start: 2021-01-01 | End: 2021-01-01 | Stop reason: ALTCHOICE

## 2021-01-01 RX ORDER — TRAMADOL HYDROCHLORIDE 50 MG/1
50 TABLET ORAL EVERY 4 HOURS PRN
Status: DISCONTINUED | OUTPATIENT
Start: 2021-01-01 | End: 2021-01-01

## 2021-01-01 RX ORDER — DEXTROSE MONOHYDRATE 25 G/50ML
50 INJECTION, SOLUTION INTRAVENOUS
Status: DISCONTINUED | OUTPATIENT
Start: 2021-01-01 | End: 2021-01-01 | Stop reason: HOSPADM

## 2021-01-01 RX ORDER — KETOROLAC TROMETHAMINE 30 MG/ML
10 INJECTION, SOLUTION INTRAMUSCULAR; INTRAVENOUS ONCE
Status: COMPLETED | OUTPATIENT
Start: 2021-01-01 | End: 2021-01-01

## 2021-01-01 RX ORDER — BISACODYL 10 MG
10 SUPPOSITORY, RECTAL RECTAL
Status: DISCONTINUED | OUTPATIENT
Start: 2021-01-01 | End: 2021-01-01

## 2021-01-01 RX ORDER — FLUCONAZOLE 100 MG/1
TABLET ORAL
Status: ACTIVE
Start: 2021-01-01 | End: 2021-01-01

## 2021-01-01 RX ORDER — TRAMADOL HYDROCHLORIDE 50 MG/1
100 TABLET ORAL EVERY 4 HOURS PRN
Status: DISCONTINUED | OUTPATIENT
Start: 2021-01-01 | End: 2021-01-01

## 2021-01-01 RX ORDER — ALPRAZOLAM 0.25 MG/1
0.25 TABLET ORAL
Status: DISCONTINUED | OUTPATIENT
Start: 2021-01-01 | End: 2021-01-01

## 2021-01-01 RX ORDER — TRAZODONE HYDROCHLORIDE 50 MG/1
50 TABLET ORAL
Status: DISCONTINUED | OUTPATIENT
Start: 2021-01-01 | End: 2021-01-01

## 2021-01-01 RX ORDER — GUAIFENESIN 600 MG/1
600 TABLET, EXTENDED RELEASE ORAL EVERY 12 HOURS
Status: DISCONTINUED | OUTPATIENT
Start: 2021-01-01 | End: 2021-01-01 | Stop reason: HOSPADM

## 2021-01-01 RX ORDER — ATORVASTATIN CALCIUM 40 MG/1
40 TABLET, FILM COATED ORAL
Status: DISCONTINUED | OUTPATIENT
Start: 2021-01-01 | End: 2021-01-01 | Stop reason: HOSPADM

## 2021-01-01 RX ORDER — METHIMAZOLE 5 MG/1
5 TABLET ORAL EVERY EVENING
Status: DISCONTINUED | OUTPATIENT
Start: 2021-01-01 | End: 2021-01-01 | Stop reason: HOSPADM

## 2021-01-01 RX ORDER — METHIMAZOLE 5 MG/1
7.5 TABLET ORAL EVERY MORNING
Status: DISCONTINUED | OUTPATIENT
Start: 2021-01-01 | End: 2021-01-01 | Stop reason: HOSPADM

## 2021-01-01 RX ORDER — HEPARIN SODIUM 5000 [USP'U]/ML
5000 INJECTION, SOLUTION INTRAVENOUS; SUBCUTANEOUS EVERY 8 HOURS
Status: DISCONTINUED | OUTPATIENT
Start: 2021-01-01 | End: 2021-01-01

## 2021-01-01 RX ORDER — ONDANSETRON 4 MG/1
4 TABLET, ORALLY DISINTEGRATING ORAL 4 TIMES DAILY PRN
Status: DISCONTINUED | OUTPATIENT
Start: 2021-01-01 | End: 2021-01-01 | Stop reason: HOSPADM

## 2021-01-01 RX ORDER — MIDODRINE HYDROCHLORIDE 5 MG/1
10 TABLET ORAL
Status: DISCONTINUED | OUTPATIENT
Start: 2021-01-01 | End: 2021-01-01 | Stop reason: HOSPADM

## 2021-01-01 RX ORDER — SODIUM CHLORIDE 9 MG/ML
500 INJECTION, SOLUTION INTRAVENOUS ONCE
Status: COMPLETED | OUTPATIENT
Start: 2021-01-01 | End: 2021-01-01

## 2021-01-01 RX ORDER — IPRATROPIUM BROMIDE AND ALBUTEROL SULFATE 2.5; .5 MG/3ML; MG/3ML
3 SOLUTION RESPIRATORY (INHALATION) 2 TIMES DAILY
COMMUNITY

## 2021-01-01 RX ORDER — DEXTROSE MONOHYDRATE 25 G/50ML
50 INJECTION, SOLUTION INTRAVENOUS
Status: DISCONTINUED | OUTPATIENT
Start: 2021-01-01 | End: 2021-01-01

## 2021-01-01 RX ORDER — LISINOPRIL 5 MG/1
2.5 TABLET ORAL
Status: DISCONTINUED | OUTPATIENT
Start: 2021-01-01 | End: 2021-01-01

## 2021-01-01 RX ORDER — SEVELAMER CARBONATE 800 MG/1
2400 TABLET, FILM COATED ORAL
Status: DISCONTINUED | OUTPATIENT
Start: 2021-01-01 | End: 2021-01-01 | Stop reason: HOSPADM

## 2021-01-01 RX ORDER — ATORVASTATIN CALCIUM 40 MG/1
40 TABLET, FILM COATED ORAL EVERY EVENING
Status: DISCONTINUED | OUTPATIENT
Start: 2021-01-01 | End: 2021-01-01

## 2021-01-01 RX ORDER — AMOXICILLIN 250 MG
2 CAPSULE ORAL 2 TIMES DAILY
Status: DISCONTINUED | OUTPATIENT
Start: 2021-01-01 | End: 2021-01-01

## 2021-01-01 RX ORDER — OXYCODONE HYDROCHLORIDE AND ACETAMINOPHEN 5; 325 MG/1; MG/1
1 TABLET ORAL EVERY 4 HOURS PRN
Qty: 15 TABLET | Refills: 0 | Status: SHIPPED | OUTPATIENT
Start: 2021-01-01 | End: 2021-01-01

## 2021-01-01 RX ORDER — POLYETHYLENE GLYCOL 3350 17 G/17G
1 POWDER, FOR SOLUTION ORAL
Status: DISCONTINUED | OUTPATIENT
Start: 2021-01-01 | End: 2021-01-01

## 2021-01-01 RX ORDER — CALCITRIOL 0.25 UG/1
0.25 CAPSULE, LIQUID FILLED ORAL DAILY
Status: DISCONTINUED | OUTPATIENT
Start: 2021-01-01 | End: 2021-01-01 | Stop reason: HOSPADM

## 2021-01-01 RX ORDER — MIDODRINE HYDROCHLORIDE 5 MG/1
15 TABLET ORAL
Status: DISCONTINUED | OUTPATIENT
Start: 2021-01-01 | End: 2021-01-01 | Stop reason: HOSPADM

## 2021-01-01 RX ORDER — METHIMAZOLE 5 MG/1
5 TABLET ORAL EVERY EVENING
Status: DISCONTINUED | OUTPATIENT
Start: 2021-01-01 | End: 2021-01-01

## 2021-01-01 RX ORDER — AMOXICILLIN 250 MG
2 CAPSULE ORAL 2 TIMES DAILY PRN
Status: DISCONTINUED | OUTPATIENT
Start: 2021-01-01 | End: 2021-01-01 | Stop reason: HOSPADM

## 2021-01-01 RX ORDER — GUAIFENESIN 600 MG/1
600 TABLET, EXTENDED RELEASE ORAL EVERY 6 HOURS PRN
Status: DISCONTINUED | OUTPATIENT
Start: 2021-01-01 | End: 2021-01-01

## 2021-01-01 RX ORDER — LISINOPRIL 5 MG/1
5 TABLET ORAL
Status: DISCONTINUED | OUTPATIENT
Start: 2021-01-01 | End: 2021-01-01

## 2021-01-01 RX ORDER — GUAIFENESIN 600 MG/1
600 TABLET, EXTENDED RELEASE ORAL 2 TIMES DAILY
Status: DISCONTINUED | OUTPATIENT
Start: 2021-01-01 | End: 2021-01-01 | Stop reason: HOSPADM

## 2021-01-01 RX ORDER — OXYCODONE HYDROCHLORIDE AND ACETAMINOPHEN 5; 325 MG/1; MG/1
1 TABLET ORAL EVERY 4 HOURS PRN
Status: DISCONTINUED | OUTPATIENT
Start: 2021-01-01 | End: 2021-01-01

## 2021-01-01 RX ORDER — GABAPENTIN 300 MG/1
300 CAPSULE ORAL 3 TIMES DAILY
Status: DISCONTINUED | OUTPATIENT
Start: 2021-01-01 | End: 2021-01-01

## 2021-01-01 RX ORDER — SODIUM CHLORIDE 9 MG/ML
500 INJECTION, SOLUTION INTRAVENOUS CONTINUOUS
Status: DISCONTINUED | OUTPATIENT
Start: 2021-01-01 | End: 2021-01-01

## 2021-01-01 RX ORDER — TRAMADOL HYDROCHLORIDE 50 MG/1
50 TABLET ORAL EVERY 6 HOURS PRN
Status: ON HOLD | COMMUNITY
End: 2021-01-01

## 2021-01-01 RX ORDER — TRAMADOL HYDROCHLORIDE 50 MG/1
25 TABLET ORAL EVERY 6 HOURS PRN
Status: DISCONTINUED | OUTPATIENT
Start: 2021-01-01 | End: 2021-01-01 | Stop reason: HOSPADM

## 2021-01-01 RX ORDER — INSULIN GLARGINE 100 [IU]/ML
8 INJECTION, SOLUTION SUBCUTANEOUS
Status: DISCONTINUED | OUTPATIENT
Start: 2021-01-01 | End: 2021-01-01 | Stop reason: HOSPADM

## 2021-01-01 RX ORDER — SEVELAMER CARBONATE 800 MG/1
800 TABLET, FILM COATED ORAL
Status: DISCONTINUED | OUTPATIENT
Start: 2021-01-01 | End: 2021-01-01

## 2021-01-01 RX ORDER — TAMSULOSIN HYDROCHLORIDE 0.4 MG/1
0.8 CAPSULE ORAL
Qty: 30 CAP | Status: ON HOLD
Start: 2021-01-01 | End: 2021-01-01

## 2021-01-01 RX ORDER — ONDANSETRON 2 MG/ML
4 INJECTION INTRAMUSCULAR; INTRAVENOUS 4 TIMES DAILY PRN
Status: DISCONTINUED | OUTPATIENT
Start: 2021-01-01 | End: 2021-01-01 | Stop reason: HOSPADM

## 2021-01-01 RX ORDER — CALCIUM ACETATE 667 MG/1
1334 TABLET ORAL
Status: CANCELLED | OUTPATIENT
Start: 2021-01-01

## 2021-01-01 RX ORDER — MIDODRINE HYDROCHLORIDE 5 MG/1
5 TABLET ORAL
Status: DISCONTINUED | OUTPATIENT
Start: 2021-01-01 | End: 2021-01-01

## 2021-01-01 RX ORDER — METHIMAZOLE 5 MG/1
5 TABLET ORAL 2 TIMES DAILY
Qty: 60 TABLET | Refills: 0 | Status: SHIPPED | OUTPATIENT
Start: 2021-01-01

## 2021-01-01 RX ORDER — HYDROMORPHONE HYDROCHLORIDE 1 MG/ML
0.1 INJECTION, SOLUTION INTRAMUSCULAR; INTRAVENOUS; SUBCUTANEOUS
Status: DISCONTINUED | OUTPATIENT
Start: 2021-01-01 | End: 2021-01-01 | Stop reason: HOSPADM

## 2021-01-01 RX ORDER — GABAPENTIN 300 MG/1
300 CAPSULE ORAL 3 TIMES DAILY PRN
Status: DISCONTINUED | OUTPATIENT
Start: 2021-01-01 | End: 2021-01-01 | Stop reason: HOSPADM

## 2021-01-01 RX ORDER — BUPIVACAINE HYDROCHLORIDE 5 MG/ML
INJECTION, SOLUTION EPIDURAL; INTRACAUDAL
Status: DISCONTINUED | OUTPATIENT
Start: 2021-01-01 | End: 2021-01-01 | Stop reason: HOSPADM

## 2021-01-01 RX ORDER — ACETAMINOPHEN 500 MG
1000 TABLET ORAL 3 TIMES DAILY
Status: DISCONTINUED | OUTPATIENT
Start: 2021-01-01 | End: 2021-01-01 | Stop reason: HOSPADM

## 2021-01-01 RX ORDER — SODIUM CHLORIDE 9 MG/ML
INJECTION, SOLUTION INTRAVENOUS
Status: DISCONTINUED | OUTPATIENT
Start: 2021-01-01 | End: 2021-01-01 | Stop reason: SURG

## 2021-01-01 RX ORDER — MIDODRINE HYDROCHLORIDE 5 MG/1
10 TABLET ORAL ONCE
Status: COMPLETED | OUTPATIENT
Start: 2021-01-01 | End: 2021-01-01

## 2021-01-01 RX ORDER — METHIMAZOLE 5 MG/1
5 TABLET ORAL EVERY EVENING
Status: CANCELLED | OUTPATIENT
Start: 2021-01-01

## 2021-01-01 RX ORDER — ACETAMINOPHEN 500 MG
1000 TABLET ORAL ONCE
Status: COMPLETED | OUTPATIENT
Start: 2021-01-01 | End: 2021-01-01

## 2021-01-01 RX ORDER — OMEPRAZOLE 20 MG/1
20 CAPSULE, DELAYED RELEASE ORAL 2 TIMES DAILY
Status: DISCONTINUED | OUTPATIENT
Start: 2021-01-01 | End: 2021-01-01 | Stop reason: HOSPADM

## 2021-01-01 RX ORDER — ALUMINA, MAGNESIA, AND SIMETHICONE 2400; 2400; 240 MG/30ML; MG/30ML; MG/30ML
20 SUSPENSION ORAL
Status: DISCONTINUED | OUTPATIENT
Start: 2021-01-01 | End: 2021-01-01 | Stop reason: HOSPADM

## 2021-01-01 RX ORDER — IPRATROPIUM BROMIDE AND ALBUTEROL SULFATE 2.5; .5 MG/3ML; MG/3ML
3 SOLUTION RESPIRATORY (INHALATION) 2 TIMES DAILY
Status: DISCONTINUED | OUTPATIENT
Start: 2021-01-01 | End: 2021-01-01

## 2021-01-01 RX ORDER — SODIUM CHLORIDE 9 MG/ML
INJECTION, SOLUTION INTRAVENOUS
Status: COMPLETED | OUTPATIENT
Start: 2021-01-01 | End: 2021-01-01

## 2021-01-01 RX ORDER — OMEPRAZOLE 20 MG/1
20 CAPSULE, DELAYED RELEASE ORAL 2 TIMES DAILY
Qty: 30 CAP | Status: ON HOLD
Start: 2021-01-01 | End: 2021-01-01 | Stop reason: SDUPTHER

## 2021-01-01 RX ORDER — OXYCODONE HYDROCHLORIDE AND ACETAMINOPHEN 5; 325 MG/1; MG/1
1 TABLET ORAL EVERY 8 HOURS PRN
Qty: 15 TABLET | Refills: 0 | Status: ON HOLD | OUTPATIENT
Start: 2021-01-01 | End: 2021-01-01

## 2021-01-01 RX ORDER — LEVOFLOXACIN 250 MG/1
250 TABLET, FILM COATED ORAL
COMMUNITY
End: 2021-01-01

## 2021-01-01 RX ORDER — POTASSIUM CHLORIDE 20 MEQ/1
20 TABLET, EXTENDED RELEASE ORAL 2 TIMES DAILY
Status: DISCONTINUED | OUTPATIENT
Start: 2021-01-01 | End: 2021-01-01

## 2021-01-01 RX ORDER — ONDANSETRON 2 MG/ML
INJECTION INTRAMUSCULAR; INTRAVENOUS PRN
Status: DISCONTINUED | OUTPATIENT
Start: 2021-01-01 | End: 2021-01-01 | Stop reason: SURG

## 2021-01-01 RX ORDER — FUROSEMIDE 10 MG/ML
80 INJECTION INTRAMUSCULAR; INTRAVENOUS
Status: DISCONTINUED | OUTPATIENT
Start: 2021-01-01 | End: 2021-01-01

## 2021-01-01 RX ORDER — FLUCONAZOLE 100 MG/1
100 TABLET ORAL DAILY
Status: COMPLETED | OUTPATIENT
Start: 2021-01-01 | End: 2021-01-01

## 2021-01-01 RX ORDER — METHIMAZOLE 5 MG/1
7.5 TABLET ORAL EVERY MORNING
Status: CANCELLED | OUTPATIENT
Start: 2021-01-01

## 2021-01-01 RX ORDER — IPRATROPIUM BROMIDE AND ALBUTEROL SULFATE 2.5; .5 MG/3ML; MG/3ML
3 SOLUTION RESPIRATORY (INHALATION)
Status: DISCONTINUED | OUTPATIENT
Start: 2021-01-01 | End: 2021-01-01

## 2021-01-01 RX ORDER — TRAZODONE HYDROCHLORIDE 50 MG/1
50 TABLET ORAL
Qty: 30 TABLET | Refills: 0 | Status: SHIPPED | OUTPATIENT
Start: 2021-01-01 | End: 2021-01-01

## 2021-01-01 RX ORDER — FLUCONAZOLE 100 MG/1
200 TABLET ORAL DAILY
Status: COMPLETED | OUTPATIENT
Start: 2021-01-01 | End: 2021-01-01

## 2021-01-01 RX ORDER — TRAZODONE HYDROCHLORIDE 50 MG/1
50 TABLET ORAL
Qty: 30 TABLET | Refills: 0 | Status: ON HOLD | OUTPATIENT
Start: 2021-01-01 | End: 2021-01-01 | Stop reason: SDUPTHER

## 2021-01-01 RX ORDER — TAMSULOSIN HYDROCHLORIDE 0.4 MG/1
0.8 CAPSULE ORAL
Qty: 30 CAPSULE | Refills: 0 | Status: SHIPPED | OUTPATIENT
Start: 2021-01-01

## 2021-01-01 RX ORDER — HEPARIN SODIUM 1000 [USP'U]/ML
INJECTION, SOLUTION INTRAVENOUS; SUBCUTANEOUS
Status: DISPENSED
Start: 2021-01-01 | End: 2021-01-01

## 2021-01-01 RX ORDER — OXYCODONE HYDROCHLORIDE 5 MG/1
5 TABLET ORAL EVERY 8 HOURS PRN
Qty: 21 TABLET | Refills: 0 | Status: SHIPPED | OUTPATIENT
Start: 2021-01-01 | End: 2021-01-01

## 2021-01-01 RX ORDER — CALCIUM ACETATE 667 MG/1
667 TABLET ORAL
Status: DISCONTINUED | OUTPATIENT
Start: 2021-01-01 | End: 2021-01-01

## 2021-01-01 RX ORDER — HEPARIN SODIUM 1000 [USP'U]/ML
3700 INJECTION, SOLUTION INTRAVENOUS; SUBCUTANEOUS
Status: DISCONTINUED | OUTPATIENT
Start: 2021-01-01 | End: 2021-01-01 | Stop reason: HOSPADM

## 2021-01-01 RX ORDER — CALCIUM ACETATE 667 MG/1
1334 TABLET ORAL
Status: DISCONTINUED | OUTPATIENT
Start: 2021-01-01 | End: 2021-01-01 | Stop reason: HOSPADM

## 2021-01-01 RX ORDER — HYDROXYZINE HYDROCHLORIDE 25 MG/1
25 TABLET, FILM COATED ORAL 3 TIMES DAILY PRN
Status: DISCONTINUED | OUTPATIENT
Start: 2021-01-01 | End: 2021-01-01 | Stop reason: HOSPADM

## 2021-01-01 RX ORDER — MIDODRINE HYDROCHLORIDE 5 MG/1
5 TABLET ORAL ONCE
Status: COMPLETED | OUTPATIENT
Start: 2021-01-01 | End: 2021-01-01

## 2021-01-01 RX ORDER — CALCITRIOL 0.25 UG/1
0.25 CAPSULE, LIQUID FILLED ORAL DAILY
Qty: 30 CAPSULE | Refills: 0 | Status: SHIPPED | OUTPATIENT
Start: 2021-01-01

## 2021-01-01 RX ORDER — TRAMADOL HYDROCHLORIDE 50 MG/1
50 TABLET ORAL EVERY 12 HOURS PRN
Status: DISCONTINUED | OUTPATIENT
Start: 2021-01-01 | End: 2021-01-01

## 2021-01-01 RX ORDER — HEPARIN SODIUM 200 [USP'U]/100ML
INJECTION, SOLUTION INTRAVENOUS
Status: COMPLETED
Start: 2021-01-01 | End: 2021-01-01

## 2021-01-01 RX ORDER — FUROSEMIDE 10 MG/ML
20 INJECTION INTRAMUSCULAR; INTRAVENOUS
Status: DISCONTINUED | OUTPATIENT
Start: 2021-01-01 | End: 2021-01-01

## 2021-01-01 RX ORDER — HEPARIN SODIUM 5000 [USP'U]/ML
5000 INJECTION, SOLUTION INTRAVENOUS; SUBCUTANEOUS EVERY 8 HOURS
Status: DISCONTINUED | OUTPATIENT
Start: 2021-01-01 | End: 2021-01-01 | Stop reason: HOSPADM

## 2021-01-01 RX ORDER — BUDESONIDE 0.5 MG/2ML
0.5 INHALANT ORAL 2 TIMES DAILY
Status: DISCONTINUED | OUTPATIENT
Start: 2021-01-01 | End: 2021-01-01

## 2021-01-01 RX ORDER — GABAPENTIN 300 MG/1
300 CAPSULE ORAL DAILY
Qty: 30 CAPSULE | Refills: 0 | Status: ON HOLD | OUTPATIENT
Start: 2021-01-01 | End: 2021-01-01

## 2021-01-01 RX ORDER — GABAPENTIN 300 MG/1
300 CAPSULE ORAL
Status: DISCONTINUED | OUTPATIENT
Start: 2021-01-01 | End: 2021-01-01 | Stop reason: HOSPADM

## 2021-01-01 RX ORDER — HEPARIN SODIUM 1000 [USP'U]/ML
1500 INJECTION, SOLUTION INTRAVENOUS; SUBCUTANEOUS
Status: DISCONTINUED | OUTPATIENT
Start: 2021-01-01 | End: 2021-01-01 | Stop reason: HOSPADM

## 2021-01-01 RX ORDER — LEVOFLOXACIN 250 MG/1
250 TABLET, FILM COATED ORAL DAILY
Status: ON HOLD | COMMUNITY
Start: 2021-01-01 | End: 2021-01-01

## 2021-01-01 RX ORDER — CALCIUM ACETATE 667 MG/1
1334 TABLET ORAL
Status: DISCONTINUED | OUTPATIENT
Start: 2021-01-01 | End: 2021-01-01

## 2021-01-01 RX ORDER — ALBUTEROL SULFATE 90 UG/1
2 AEROSOL, METERED RESPIRATORY (INHALATION)
Status: DISCONTINUED | OUTPATIENT
Start: 2021-01-01 | End: 2021-01-01 | Stop reason: ALTCHOICE

## 2021-01-01 RX ORDER — CALCIUM CARBONATE 500 MG/1
1000 TABLET, CHEWABLE ORAL ONCE
Status: COMPLETED | OUTPATIENT
Start: 2021-01-01 | End: 2021-01-01

## 2021-01-01 RX ORDER — TRAMADOL HYDROCHLORIDE 50 MG/1
100 TABLET ORAL EVERY 12 HOURS PRN
Status: DISCONTINUED | OUTPATIENT
Start: 2021-01-01 | End: 2021-01-01

## 2021-01-01 RX ORDER — INSULIN LISPRO 100 [IU]/ML
1-6 INJECTION, SOLUTION INTRAVENOUS; SUBCUTANEOUS
Status: DISCONTINUED | OUTPATIENT
Start: 2021-01-01 | End: 2021-01-01 | Stop reason: HOSPADM

## 2021-01-01 RX ORDER — ONDANSETRON 2 MG/ML
4 INJECTION INTRAMUSCULAR; INTRAVENOUS EVERY 4 HOURS PRN
Status: DISCONTINUED | OUTPATIENT
Start: 2021-01-01 | End: 2021-01-01 | Stop reason: HOSPADM

## 2021-01-01 RX ORDER — CLOPIDOGREL BISULFATE 75 MG/1
75 TABLET ORAL DAILY
Status: DISCONTINUED | OUTPATIENT
Start: 2021-01-01 | End: 2021-01-01

## 2021-01-01 RX ORDER — ATORVASTATIN CALCIUM 40 MG/1
40 TABLET, FILM COATED ORAL EVERY EVENING
Status: DISCONTINUED | OUTPATIENT
Start: 2021-01-01 | End: 2021-01-01 | Stop reason: HOSPADM

## 2021-01-01 RX ORDER — CLOPIDOGREL BISULFATE 75 MG/1
75 TABLET ORAL DAILY
Qty: 30 TABLET | Refills: 0 | Status: SHIPPED | OUTPATIENT
Start: 2021-01-01

## 2021-01-01 RX ORDER — OMEPRAZOLE 20 MG/1
20 CAPSULE, DELAYED RELEASE ORAL DAILY
Status: DISCONTINUED | OUTPATIENT
Start: 2021-01-01 | End: 2021-01-01 | Stop reason: HOSPADM

## 2021-01-01 RX ORDER — OXYCODONE HYDROCHLORIDE 5 MG/1
5 TABLET ORAL
Status: DISCONTINUED | OUTPATIENT
Start: 2021-01-01 | End: 2021-01-01

## 2021-01-01 RX ORDER — ACETAMINOPHEN 500 MG
1000 TABLET ORAL 3 TIMES DAILY
Qty: 30 TABLET | Refills: 0 | Status: SHIPPED | OUTPATIENT
Start: 2021-01-01

## 2021-01-01 RX ORDER — HEPARIN SODIUM 1000 [USP'U]/ML
1800 INJECTION, SOLUTION INTRAVENOUS; SUBCUTANEOUS
Status: DISCONTINUED | OUTPATIENT
Start: 2021-01-01 | End: 2021-01-01 | Stop reason: HOSPADM

## 2021-01-01 RX ORDER — BISACODYL 10 MG
10 SUPPOSITORY, RECTAL RECTAL
Status: CANCELLED | OUTPATIENT
Start: 2021-01-01

## 2021-01-01 RX ORDER — POLYVINYL ALCOHOL 14 MG/ML
1 SOLUTION/ DROPS OPHTHALMIC PRN
Status: DISCONTINUED | OUTPATIENT
Start: 2021-01-01 | End: 2021-01-01 | Stop reason: HOSPADM

## 2021-01-01 RX ORDER — MIDODRINE HYDROCHLORIDE 10 MG/1
10 TABLET ORAL
Qty: 90 TABLET | Refills: 0 | Status: ON HOLD | OUTPATIENT
Start: 2021-01-01 | End: 2021-01-01

## 2021-01-01 RX ORDER — HYDROMORPHONE HYDROCHLORIDE 1 MG/ML
0.5 INJECTION, SOLUTION INTRAMUSCULAR; INTRAVENOUS; SUBCUTANEOUS EVERY 6 HOURS PRN
Status: DISCONTINUED | OUTPATIENT
Start: 2021-01-01 | End: 2021-01-01 | Stop reason: HOSPADM

## 2021-01-01 RX ORDER — ENEMA 19; 7 G/133ML; G/133ML
1 ENEMA RECTAL
Status: DISCONTINUED | OUTPATIENT
Start: 2021-01-01 | End: 2021-01-01 | Stop reason: HOSPADM

## 2021-01-01 RX ORDER — DOXYCYCLINE 100 MG/1
100 TABLET ORAL EVERY 12 HOURS
Status: DISCONTINUED | OUTPATIENT
Start: 2021-01-01 | End: 2021-01-01 | Stop reason: HOSPADM

## 2021-01-01 RX ORDER — FUROSEMIDE 10 MG/ML
40 INJECTION INTRAMUSCULAR; INTRAVENOUS ONCE
Status: COMPLETED | OUTPATIENT
Start: 2021-01-01 | End: 2021-01-01

## 2021-01-01 RX ORDER — GABAPENTIN 100 MG/1
100 CAPSULE ORAL DAILY
Status: DISCONTINUED | OUTPATIENT
Start: 2021-01-01 | End: 2021-01-01 | Stop reason: HOSPADM

## 2021-01-01 RX ORDER — MORPHINE SULFATE 15 MG/1
15 TABLET, FILM COATED, EXTENDED RELEASE ORAL EVERY 12 HOURS
Status: DISCONTINUED | OUTPATIENT
Start: 2021-01-01 | End: 2021-01-01

## 2021-01-01 RX ORDER — FUROSEMIDE 10 MG/ML
40 INJECTION INTRAMUSCULAR; INTRAVENOUS
Status: DISCONTINUED | OUTPATIENT
Start: 2021-01-01 | End: 2021-01-01 | Stop reason: HOSPADM

## 2021-01-01 RX ORDER — GUAIFENESIN 400 MG/1
400 TABLET ORAL 3 TIMES DAILY
Status: ON HOLD | COMMUNITY
End: 2021-01-01

## 2021-01-01 RX ORDER — INSULIN GLARGINE 100 [IU]/ML
8 INJECTION, SOLUTION SUBCUTANEOUS
Qty: 3 ML | Refills: 0 | Status: ON HOLD | OUTPATIENT
Start: 2021-01-01 | End: 2021-01-01

## 2021-01-01 RX ORDER — HYDROXYZINE HYDROCHLORIDE 25 MG/1
50 TABLET, FILM COATED ORAL EVERY 6 HOURS PRN
Status: DISCONTINUED | OUTPATIENT
Start: 2021-01-01 | End: 2021-01-01 | Stop reason: HOSPADM

## 2021-01-01 RX ORDER — PREDNISONE 20 MG/1
40 TABLET ORAL DAILY
Status: DISCONTINUED | OUTPATIENT
Start: 2021-01-01 | End: 2021-01-01

## 2021-01-01 RX ORDER — HEPARIN SODIUM 5000 [USP'U]/ML
INJECTION, SOLUTION INTRAVENOUS; SUBCUTANEOUS
Status: DISCONTINUED | OUTPATIENT
Start: 2021-01-01 | End: 2021-01-01 | Stop reason: HOSPADM

## 2021-01-01 RX ORDER — FUROSEMIDE 10 MG/ML
80 INJECTION INTRAMUSCULAR; INTRAVENOUS ONCE
Status: DISCONTINUED | OUTPATIENT
Start: 2021-01-01 | End: 2021-01-01

## 2021-01-01 RX ORDER — HALOPERIDOL 5 MG/ML
1 INJECTION INTRAMUSCULAR
Status: DISCONTINUED | OUTPATIENT
Start: 2021-01-01 | End: 2021-01-01 | Stop reason: HOSPADM

## 2021-01-01 RX ORDER — AMOXICILLIN 250 MG
2 CAPSULE ORAL 2 TIMES DAILY
Qty: 30 TAB | Refills: 0 | Status: ON HOLD
Start: 2021-01-01 | End: 2021-01-01

## 2021-01-01 RX ORDER — CARVEDILOL 3.12 MG/1
3.12 TABLET ORAL 2 TIMES DAILY
Status: DISCONTINUED | OUTPATIENT
Start: 2021-01-01 | End: 2021-01-01 | Stop reason: HOSPADM

## 2021-01-01 RX ORDER — TRAZODONE HYDROCHLORIDE 50 MG/1
50 TABLET ORAL
Qty: 30 TAB | Refills: 3 | Status: ON HOLD
Start: 2021-01-01 | End: 2021-01-01 | Stop reason: SDUPTHER

## 2021-01-01 RX ORDER — GABAPENTIN 300 MG/1
300 CAPSULE ORAL DAILY
Qty: 90 CAP | Status: ON HOLD
Start: 2021-01-01 | End: 2021-01-01

## 2021-01-01 RX ORDER — FUROSEMIDE 10 MG/ML
80 INJECTION INTRAMUSCULAR; INTRAVENOUS ONCE
Status: COMPLETED | OUTPATIENT
Start: 2021-01-01 | End: 2021-01-01

## 2021-01-01 RX ORDER — METOPROLOL TARTRATE 1 MG/ML
5 INJECTION, SOLUTION INTRAVENOUS ONCE
Status: ACTIVE | OUTPATIENT
Start: 2021-01-01 | End: 2021-01-01

## 2021-01-01 RX ORDER — HEPARIN SODIUM 1000 [USP'U]/ML
2000 INJECTION, SOLUTION INTRAVENOUS; SUBCUTANEOUS
Status: DISCONTINUED | OUTPATIENT
Start: 2021-01-01 | End: 2021-01-01 | Stop reason: HOSPADM

## 2021-01-01 RX ORDER — ACETAMINOPHEN 325 MG/1
650 TABLET ORAL 3 TIMES DAILY
Status: ON HOLD | COMMUNITY
End: 2021-01-01

## 2021-01-01 RX ORDER — METHIMAZOLE 5 MG/1
7.5 TABLET ORAL EVERY MORNING
Status: DISCONTINUED | OUTPATIENT
Start: 2021-01-01 | End: 2021-01-01

## 2021-01-01 RX ORDER — POLYETHYLENE GLYCOL 3350 17 G/17G
1 POWDER, FOR SOLUTION ORAL
Status: DISCONTINUED | OUTPATIENT
Start: 2021-01-01 | End: 2021-01-01 | Stop reason: HOSPADM

## 2021-01-01 RX ORDER — AMOXICILLIN 250 MG
2 CAPSULE ORAL 2 TIMES DAILY
Status: CANCELLED | OUTPATIENT
Start: 2021-01-01

## 2021-01-01 RX ORDER — MIDODRINE HYDROCHLORIDE 5 MG/1
10 TABLET ORAL ONCE
Status: DISCONTINUED | OUTPATIENT
Start: 2021-01-01 | End: 2021-01-01

## 2021-01-01 RX ORDER — IPRATROPIUM BROMIDE AND ALBUTEROL SULFATE 2.5; .5 MG/3ML; MG/3ML
3 SOLUTION RESPIRATORY (INHALATION)
Status: DISCONTINUED | OUTPATIENT
Start: 2021-01-01 | End: 2021-01-01 | Stop reason: ALTCHOICE

## 2021-01-01 RX ORDER — LANOLIN ALCOHOL/MO/W.PET/CERES
3 CREAM (GRAM) TOPICAL NIGHTLY PRN
Status: DISCONTINUED | OUTPATIENT
Start: 2021-01-01 | End: 2021-01-01 | Stop reason: HOSPADM

## 2021-01-01 RX ORDER — BUDESONIDE 0.5 MG/2ML
500 INHALANT ORAL 2 TIMES DAILY
COMMUNITY
End: 2021-01-01

## 2021-01-01 RX ORDER — POTASSIUM CHLORIDE 20 MEQ/1
40 TABLET, EXTENDED RELEASE ORAL ONCE
Status: COMPLETED | OUTPATIENT
Start: 2021-01-01 | End: 2021-01-01

## 2021-01-01 RX ORDER — ONDANSETRON 2 MG/ML
4 INJECTION INTRAMUSCULAR; INTRAVENOUS
Status: DISCONTINUED | OUTPATIENT
Start: 2021-01-01 | End: 2021-01-01 | Stop reason: HOSPADM

## 2021-01-01 RX ORDER — SODIUM CHLORIDE, SODIUM LACTATE, POTASSIUM CHLORIDE, CALCIUM CHLORIDE 600; 310; 30; 20 MG/100ML; MG/100ML; MG/100ML; MG/100ML
INJECTION, SOLUTION INTRAVENOUS CONTINUOUS
Status: DISCONTINUED | OUTPATIENT
Start: 2021-01-01 | End: 2021-01-01 | Stop reason: HOSPADM

## 2021-01-01 RX ORDER — SODIUM CHLORIDE, SODIUM LACTATE, POTASSIUM CHLORIDE, AND CALCIUM CHLORIDE .6; .31; .03; .02 G/100ML; G/100ML; G/100ML; G/100ML
1000 INJECTION, SOLUTION INTRAVENOUS ONCE
Status: COMPLETED | OUTPATIENT
Start: 2021-01-01 | End: 2021-01-01

## 2021-01-01 RX ORDER — ECHINACEA PURPUREA EXTRACT 125 MG
2 TABLET ORAL PRN
Status: DISCONTINUED | OUTPATIENT
Start: 2021-01-01 | End: 2021-01-01 | Stop reason: HOSPADM

## 2021-01-01 RX ORDER — MIDODRINE HYDROCHLORIDE 5 MG/1
15 TABLET ORAL
Qty: 60 TABLET | Refills: 0 | Status: SHIPPED | OUTPATIENT
Start: 2021-01-01

## 2021-01-01 RX ORDER — TRAMADOL HYDROCHLORIDE 50 MG/1
50 TABLET ORAL EVERY 4 HOURS PRN
Status: DISCONTINUED | OUTPATIENT
Start: 2021-01-01 | End: 2021-01-01 | Stop reason: HOSPADM

## 2021-01-01 RX ORDER — ONDANSETRON 4 MG/1
4 TABLET, ORALLY DISINTEGRATING ORAL EVERY 4 HOURS PRN
Status: DISCONTINUED | OUTPATIENT
Start: 2021-01-01 | End: 2021-01-01

## 2021-01-01 RX ORDER — LIDOCAINE HYDROCHLORIDE 20 MG/ML
20 INJECTION, SOLUTION INFILTRATION; PERINEURAL ONCE
Status: DISPENSED | OUTPATIENT
Start: 2021-01-01 | End: 2021-01-01

## 2021-01-01 RX ORDER — INSULIN GLARGINE 100 [IU]/ML
5 INJECTION, SOLUTION SUBCUTANEOUS
Status: DISCONTINUED | OUTPATIENT
Start: 2021-01-01 | End: 2021-01-01

## 2021-01-01 RX ORDER — GABAPENTIN 300 MG/1
300 CAPSULE ORAL DAILY
Status: DISCONTINUED | OUTPATIENT
Start: 2021-01-01 | End: 2021-01-01

## 2021-01-01 RX ORDER — GABAPENTIN 300 MG/1
300 CAPSULE ORAL 3 TIMES DAILY PRN
Qty: 90 CAPSULE | Refills: 0 | Status: SHIPPED | OUTPATIENT
Start: 2021-01-01

## 2021-01-01 RX ORDER — OXYCODONE HYDROCHLORIDE AND ACETAMINOPHEN 5; 325 MG/1; MG/1
1 TABLET ORAL EVERY 4 HOURS PRN
Status: ON HOLD | COMMUNITY
End: 2021-01-01

## 2021-01-01 RX ORDER — LABETALOL HYDROCHLORIDE 5 MG/ML
5 INJECTION, SOLUTION INTRAVENOUS
Status: DISCONTINUED | OUTPATIENT
Start: 2021-01-01 | End: 2021-01-01 | Stop reason: HOSPADM

## 2021-01-01 RX ORDER — HEPARIN SODIUM 1000 [USP'U]/ML
1500 INJECTION, SOLUTION INTRAVENOUS; SUBCUTANEOUS
Status: DISCONTINUED | OUTPATIENT
Start: 2021-01-01 | End: 2021-01-01

## 2021-01-01 RX ORDER — TRAMADOL HYDROCHLORIDE 50 MG/1
25 TABLET ORAL EVERY 6 HOURS PRN
Qty: 10 TABLET | Refills: 0 | Status: SHIPPED | OUTPATIENT
Start: 2021-01-01 | End: 2021-01-01

## 2021-01-01 RX ORDER — TRAMADOL HYDROCHLORIDE 50 MG/1
50 TABLET ORAL EVERY 6 HOURS PRN
Status: DISCONTINUED | OUTPATIENT
Start: 2021-01-01 | End: 2021-01-01 | Stop reason: HOSPADM

## 2021-01-01 RX ORDER — OMEPRAZOLE 20 MG/1
20 CAPSULE, DELAYED RELEASE ORAL DAILY
Status: CANCELLED | OUTPATIENT
Start: 2021-01-01

## 2021-01-01 RX ORDER — HYDRALAZINE HYDROCHLORIDE 20 MG/ML
5 INJECTION INTRAMUSCULAR; INTRAVENOUS
Status: DISCONTINUED | OUTPATIENT
Start: 2021-01-01 | End: 2021-01-01 | Stop reason: HOSPADM

## 2021-01-01 RX ORDER — GUAIFENESIN 600 MG/1
600 TABLET, EXTENDED RELEASE ORAL 2 TIMES DAILY
Qty: 28 TABLET | Refills: 0 | Status: SHIPPED | OUTPATIENT
Start: 2021-01-01

## 2021-01-01 RX ORDER — GUAIFENESIN 600 MG/1
600 TABLET, EXTENDED RELEASE ORAL EVERY 6 HOURS
Status: DISCONTINUED | OUTPATIENT
Start: 2021-01-01 | End: 2021-01-01 | Stop reason: HOSPADM

## 2021-01-01 RX ORDER — PREDNISONE 20 MG/1
40 TABLET ORAL DAILY
Status: DISCONTINUED | OUTPATIENT
Start: 2021-01-01 | End: 2021-01-01 | Stop reason: HOSPADM

## 2021-01-01 RX ORDER — OXYCODONE HYDROCHLORIDE 5 MG/1
5 TABLET ORAL ONCE
Status: ACTIVE | OUTPATIENT
Start: 2021-01-01 | End: 2021-01-01

## 2021-01-01 RX ORDER — TRAMADOL HYDROCHLORIDE 50 MG/1
50 TABLET ORAL EVERY 6 HOURS PRN
Status: DISCONTINUED | OUTPATIENT
Start: 2021-01-01 | End: 2021-01-01

## 2021-01-01 RX ORDER — MIDAZOLAM HYDROCHLORIDE 1 MG/ML
INJECTION INTRAMUSCULAR; INTRAVENOUS
Status: COMPLETED
Start: 2021-01-01 | End: 2021-01-01

## 2021-01-01 RX ORDER — DEXTROSE MONOHYDRATE 25 G/50ML
50 INJECTION, SOLUTION INTRAVENOUS
Status: CANCELLED | OUTPATIENT
Start: 2021-01-01

## 2021-01-01 RX ORDER — OXYCODONE HYDROCHLORIDE AND ACETAMINOPHEN 5; 325 MG/1; MG/1
1 TABLET ORAL EVERY 4 HOURS PRN
COMMUNITY
End: 2021-01-01

## 2021-01-01 RX ORDER — OMEPRAZOLE 20 MG/1
20 CAPSULE, DELAYED RELEASE ORAL DAILY
Status: DISCONTINUED | OUTPATIENT
Start: 2021-01-01 | End: 2021-01-01

## 2021-01-01 RX ORDER — OXYCODONE HCL 5 MG/5 ML
10 SOLUTION, ORAL ORAL
Status: COMPLETED | OUTPATIENT
Start: 2021-01-01 | End: 2021-01-01

## 2021-01-01 RX ORDER — METOPROLOL SUCCINATE 25 MG/1
25 TABLET, EXTENDED RELEASE ORAL DAILY
Qty: 30 TABLET | Refills: 0 | Status: SHIPPED | OUTPATIENT
Start: 2021-01-01

## 2021-01-01 RX ORDER — MORPHINE SULFATE 4 MG/ML
2 INJECTION, SOLUTION INTRAMUSCULAR; INTRAVENOUS EVERY 4 HOURS PRN
Status: DISCONTINUED | OUTPATIENT
Start: 2021-01-01 | End: 2021-01-01

## 2021-01-01 RX ORDER — VERAPAMIL HYDROCHLORIDE 2.5 MG/ML
INJECTION, SOLUTION INTRAVENOUS
Status: COMPLETED
Start: 2021-01-01 | End: 2021-01-01

## 2021-01-01 RX ORDER — TRAMADOL HYDROCHLORIDE 50 MG/1
25 TABLET ORAL EVERY 6 HOURS PRN
Status: ON HOLD | COMMUNITY
End: 2021-01-01 | Stop reason: SDUPTHER

## 2021-01-01 RX ORDER — HEPARIN SODIUM 1000 [USP'U]/ML
3700 INJECTION, SOLUTION INTRAVENOUS; SUBCUTANEOUS ONCE
Status: COMPLETED | OUTPATIENT
Start: 2021-01-01 | End: 2021-01-01

## 2021-01-01 RX ORDER — INSULIN GLARGINE 100 [IU]/ML
8 INJECTION, SOLUTION SUBCUTANEOUS
Qty: 3 ML | Refills: 0 | Status: SHIPPED | OUTPATIENT
Start: 2021-01-01 | End: 2021-01-01 | Stop reason: SDUPTHER

## 2021-01-01 RX ORDER — MIDODRINE HYDROCHLORIDE 5 MG/1
10 TABLET ORAL 3 TIMES DAILY
Status: DISCONTINUED | OUTPATIENT
Start: 2021-01-01 | End: 2021-01-01 | Stop reason: HOSPADM

## 2021-01-01 RX ORDER — CARVEDILOL 3.12 MG/1
3.12 TABLET ORAL 2 TIMES DAILY WITH MEALS
Status: DISCONTINUED | OUTPATIENT
Start: 2021-01-01 | End: 2021-01-01 | Stop reason: HOSPADM

## 2021-01-01 RX ORDER — GABAPENTIN 300 MG/1
300 CAPSULE ORAL ONCE
Status: COMPLETED | OUTPATIENT
Start: 2021-01-01 | End: 2021-01-01

## 2021-01-01 RX ORDER — CARVEDILOL 3.12 MG/1
3.12 TABLET ORAL 2 TIMES DAILY
Status: DISCONTINUED | OUTPATIENT
Start: 2021-01-01 | End: 2021-01-01

## 2021-01-01 RX ORDER — CALCITRIOL 0.25 UG/1
0.25 CAPSULE, LIQUID FILLED ORAL DAILY
Status: CANCELLED | OUTPATIENT
Start: 2021-01-01

## 2021-01-01 RX ORDER — GUAIFENESIN 600 MG/1
600 TABLET, EXTENDED RELEASE ORAL
Status: DISCONTINUED | OUTPATIENT
Start: 2021-01-01 | End: 2021-01-01

## 2021-01-01 RX ORDER — INSULIN GLARGINE 100 [IU]/ML
10 INJECTION, SOLUTION SUBCUTANEOUS
COMMUNITY

## 2021-01-01 RX ORDER — ATORVASTATIN CALCIUM 40 MG/1
40 TABLET, FILM COATED ORAL
Qty: 30 TABLET | Refills: 0 | Status: SHIPPED | OUTPATIENT
Start: 2021-01-01

## 2021-01-01 RX ORDER — BUDESONIDE 0.5 MG/2ML
0.5 INHALANT ORAL
Status: DISCONTINUED | OUTPATIENT
Start: 2021-01-01 | End: 2021-01-01 | Stop reason: HOSPADM

## 2021-01-01 RX ORDER — TRAZODONE HYDROCHLORIDE 50 MG/1
50 TABLET ORAL
COMMUNITY

## 2021-01-01 RX ORDER — TRAMADOL HYDROCHLORIDE 50 MG/1
50 TABLET ORAL 2 TIMES DAILY PRN
Status: DISCONTINUED | OUTPATIENT
Start: 2021-01-01 | End: 2021-01-01

## 2021-01-01 RX ORDER — FUROSEMIDE 10 MG/ML
40 INJECTION INTRAMUSCULAR; INTRAVENOUS
Status: DISCONTINUED | OUTPATIENT
Start: 2021-01-01 | End: 2021-01-01

## 2021-01-01 RX ORDER — CEFAZOLIN SODIUM 1 G/3ML
INJECTION, POWDER, FOR SOLUTION INTRAMUSCULAR; INTRAVENOUS PRN
Status: DISCONTINUED | OUTPATIENT
Start: 2021-01-01 | End: 2021-01-01 | Stop reason: SURG

## 2021-01-01 RX ORDER — ACETAMINOPHEN 500 MG
500 TABLET ORAL EVERY 6 HOURS PRN
Status: DISCONTINUED | OUTPATIENT
Start: 2021-01-01 | End: 2021-01-01

## 2021-01-01 RX ORDER — DIPHENHYDRAMINE HYDROCHLORIDE 50 MG/ML
12.5 INJECTION INTRAMUSCULAR; INTRAVENOUS
Status: DISCONTINUED | OUTPATIENT
Start: 2021-01-01 | End: 2021-01-01 | Stop reason: HOSPADM

## 2021-01-01 RX ORDER — NOREPINEPHRINE BITARTRATE 0.03 MG/ML
0-30 INJECTION, SOLUTION INTRAVENOUS CONTINUOUS
Status: DISCONTINUED | OUTPATIENT
Start: 2021-01-01 | End: 2021-01-01

## 2021-01-01 RX ORDER — OXYCODONE HYDROCHLORIDE 10 MG/1
10 TABLET ORAL EVERY 4 HOURS PRN
Status: DISCONTINUED | OUTPATIENT
Start: 2021-01-01 | End: 2021-01-01

## 2021-01-01 RX ORDER — ACETAMINOPHEN 325 MG/1
650 TABLET ORAL EVERY 6 HOURS PRN
Status: DISCONTINUED | OUTPATIENT
Start: 2021-01-01 | End: 2021-01-01

## 2021-01-01 RX ORDER — METOPROLOL TARTRATE 1 MG/ML
5 INJECTION, SOLUTION INTRAVENOUS
Status: DISCONTINUED | OUTPATIENT
Start: 2021-01-01 | End: 2021-01-01 | Stop reason: HOSPADM

## 2021-01-01 RX ORDER — FUROSEMIDE 40 MG/1
40 TABLET ORAL SEE ADMIN INSTRUCTIONS
COMMUNITY

## 2021-01-01 RX ORDER — IODIXANOL 270 MG/ML
INJECTION, SOLUTION INTRAVASCULAR
Status: DISCONTINUED | OUTPATIENT
Start: 2021-01-01 | End: 2021-01-01 | Stop reason: HOSPADM

## 2021-01-01 RX ORDER — HYDROMORPHONE HYDROCHLORIDE 1 MG/ML
0.4 INJECTION, SOLUTION INTRAMUSCULAR; INTRAVENOUS; SUBCUTANEOUS
Status: DISCONTINUED | OUTPATIENT
Start: 2021-01-01 | End: 2021-01-01 | Stop reason: HOSPADM

## 2021-01-01 RX ORDER — BUPIVACAINE HYDROCHLORIDE AND EPINEPHRINE 5; 5 MG/ML; UG/ML
INJECTION, SOLUTION PERINEURAL
Status: DISCONTINUED | OUTPATIENT
Start: 2021-01-01 | End: 2021-01-01 | Stop reason: HOSPADM

## 2021-01-01 RX ORDER — GUAIFENESIN/DEXTROMETHORPHAN 100-10MG/5
10 SYRUP ORAL EVERY 6 HOURS PRN
Status: DISCONTINUED | OUTPATIENT
Start: 2021-01-01 | End: 2021-01-01 | Stop reason: HOSPADM

## 2021-01-01 RX ORDER — HYDROMORPHONE HYDROCHLORIDE 1 MG/ML
0.2 INJECTION, SOLUTION INTRAMUSCULAR; INTRAVENOUS; SUBCUTANEOUS
Status: DISCONTINUED | OUTPATIENT
Start: 2021-01-01 | End: 2021-01-01 | Stop reason: HOSPADM

## 2021-01-01 RX ORDER — LIDOCAINE HYDROCHLORIDE 20 MG/ML
JELLY TOPICAL
Status: COMPLETED | OUTPATIENT
Start: 2021-01-01 | End: 2021-01-01

## 2021-01-01 RX ORDER — IPRATROPIUM BROMIDE AND ALBUTEROL SULFATE 2.5; .5 MG/3ML; MG/3ML
3 SOLUTION RESPIRATORY (INHALATION)
Status: DISCONTINUED | OUTPATIENT
Start: 2021-01-01 | End: 2021-01-01 | Stop reason: HOSPADM

## 2021-01-01 RX ORDER — LACTULOSE 20 G/30ML
30 SOLUTION ORAL
Status: DISCONTINUED | OUTPATIENT
Start: 2021-01-01 | End: 2021-01-01 | Stop reason: HOSPADM

## 2021-01-01 RX ORDER — FUROSEMIDE 10 MG/ML
80 INJECTION INTRAMUSCULAR; INTRAVENOUS
Status: DISCONTINUED | OUTPATIENT
Start: 2021-01-01 | End: 2021-01-01 | Stop reason: HOSPADM

## 2021-01-01 RX ORDER — CLOPIDOGREL 300 MG/1
600 TABLET, FILM COATED ORAL ONCE
Status: COMPLETED | OUTPATIENT
Start: 2021-01-01 | End: 2021-01-01

## 2021-01-01 RX ADMIN — CALCITRIOL CAPSULES 0.25 MCG 0.25 MCG: 0.25 CAPSULE ORAL at 04:18

## 2021-01-01 RX ADMIN — SEVELAMER CARBONATE 1600 MG: 800 TABLET, FILM COATED ORAL at 19:37

## 2021-01-01 RX ADMIN — METOPROLOL TARTRATE 37.5 MG: 25 TABLET, FILM COATED ORAL at 05:41

## 2021-01-01 RX ADMIN — DOCUSATE SODIUM 50 MG AND SENNOSIDES 8.6 MG 2 TABLET: 8.6; 5 TABLET, FILM COATED ORAL at 06:25

## 2021-01-01 RX ADMIN — HEPARIN SODIUM 5000 UNITS: 5000 INJECTION, SOLUTION INTRAVENOUS; SUBCUTANEOUS at 14:38

## 2021-01-01 RX ADMIN — FLUCONAZOLE 200 MG: 100 TABLET ORAL at 08:21

## 2021-01-01 RX ADMIN — CALCITRIOL CAPSULES 0.25 MCG 0.25 MCG: 0.25 CAPSULE ORAL at 06:53

## 2021-01-01 RX ADMIN — MIDODRINE HYDROCHLORIDE 10 MG: 5 TABLET ORAL at 18:30

## 2021-01-01 RX ADMIN — MIDODRINE HYDROCHLORIDE 10 MG: 5 TABLET ORAL at 17:43

## 2021-01-01 RX ADMIN — MICONAZOLE NITRATE: 20 CREAM TOPICAL at 21:57

## 2021-01-01 RX ADMIN — MIDODRINE HYDROCHLORIDE 15 MG: 5 TABLET ORAL at 12:14

## 2021-01-01 RX ADMIN — TAMSULOSIN HYDROCHLORIDE 0.8 MG: 0.4 CAPSULE ORAL at 20:51

## 2021-01-01 RX ADMIN — MIDODRINE HYDROCHLORIDE 10 MG: 5 TABLET ORAL at 13:35

## 2021-01-01 RX ADMIN — CLOPIDOGREL BISULFATE 75 MG: 75 TABLET ORAL at 06:23

## 2021-01-01 RX ADMIN — OMEPRAZOLE 20 MG: 20 CAPSULE, DELAYED RELEASE ORAL at 06:29

## 2021-01-01 RX ADMIN — SODIUM CHLORIDE, POTASSIUM CHLORIDE, SODIUM LACTATE AND CALCIUM CHLORIDE 1000 ML: 600; 310; 30; 20 INJECTION, SOLUTION INTRAVENOUS at 16:21

## 2021-01-01 RX ADMIN — SEVELAMER CARBONATE 1600 MG: 800 TABLET, FILM COATED ORAL at 18:08

## 2021-01-01 RX ADMIN — METOPROLOL TARTRATE 25 MG: 25 TABLET, FILM COATED ORAL at 17:42

## 2021-01-01 RX ADMIN — FLUCONAZOLE 100 MG: 100 TABLET ORAL at 10:26

## 2021-01-01 RX ADMIN — GABAPENTIN 300 MG: 300 CAPSULE ORAL at 17:04

## 2021-01-01 RX ADMIN — MIDODRINE HYDROCHLORIDE 10 MG: 5 TABLET ORAL at 17:00

## 2021-01-01 RX ADMIN — METHIMAZOLE 5 MG: 5 TABLET ORAL at 17:34

## 2021-01-01 RX ADMIN — METHIMAZOLE 7.5 MG: 5 TABLET ORAL at 08:05

## 2021-01-01 RX ADMIN — EPOETIN ALFA-EPBX 4000 UNITS: 4000 INJECTION, SOLUTION INTRAVENOUS; SUBCUTANEOUS at 16:11

## 2021-01-01 RX ADMIN — INSULIN GLARGINE 8 UNITS: 100 INJECTION, SOLUTION SUBCUTANEOUS at 08:18

## 2021-01-01 RX ADMIN — METHIMAZOLE 5 MG: 5 TABLET ORAL at 17:00

## 2021-01-01 RX ADMIN — OXYCODONE 5 MG: 5 TABLET ORAL at 17:36

## 2021-01-01 RX ADMIN — MIDODRINE HYDROCHLORIDE 10 MG: 5 TABLET ORAL at 08:13

## 2021-01-01 RX ADMIN — ACETAMINOPHEN 1000 MG: 500 TABLET ORAL at 08:08

## 2021-01-01 RX ADMIN — CLOPIDOGREL BISULFATE 75 MG: 75 TABLET ORAL at 05:52

## 2021-01-01 RX ADMIN — INSULIN GLARGINE 8 UNITS: 100 INJECTION, SOLUTION SUBCUTANEOUS at 06:01

## 2021-01-01 RX ADMIN — MIDODRINE HYDROCHLORIDE 10 MG: 5 TABLET ORAL at 20:32

## 2021-01-01 RX ADMIN — GABAPENTIN 300 MG: 300 CAPSULE ORAL at 08:21

## 2021-01-01 RX ADMIN — ACETAMINOPHEN 650 MG: 325 TABLET, FILM COATED ORAL at 17:15

## 2021-01-01 RX ADMIN — ATORVASTATIN CALCIUM 40 MG: 40 TABLET, FILM COATED ORAL at 20:39

## 2021-01-01 RX ADMIN — SEVELAMER CARBONATE 1600 MG: 800 TABLET, FILM COATED ORAL at 08:55

## 2021-01-01 RX ADMIN — HYDROMORPHONE HYDROCHLORIDE 0.5 MG: 1 INJECTION, SOLUTION INTRAMUSCULAR; INTRAVENOUS; SUBCUTANEOUS at 03:28

## 2021-01-01 RX ADMIN — DOCUSATE SODIUM 50 MG AND SENNOSIDES 8.6 MG 2 TABLET: 8.6; 5 TABLET, FILM COATED ORAL at 05:07

## 2021-01-01 RX ADMIN — HEPARIN SODIUM 1800 UNITS: 1000 INJECTION, SOLUTION INTRAVENOUS; SUBCUTANEOUS at 09:58

## 2021-01-01 RX ADMIN — TRAMADOL HYDROCHLORIDE 100 MG: 50 TABLET, FILM COATED ORAL at 12:01

## 2021-01-01 RX ADMIN — NOREPINEPHRINE BITARTRATE 12 MCG/MIN: 1 INJECTION, SOLUTION, CONCENTRATE INTRAVENOUS at 10:29

## 2021-01-01 RX ADMIN — SEVELAMER CARBONATE 2400 MG: 800 TABLET, FILM COATED ORAL at 09:21

## 2021-01-01 RX ADMIN — MIDODRINE HYDROCHLORIDE 10 MG: 5 TABLET ORAL at 08:52

## 2021-01-01 RX ADMIN — MIDODRINE HYDROCHLORIDE 10 MG: 5 TABLET ORAL at 14:37

## 2021-01-01 RX ADMIN — METHIMAZOLE 7.5 MG: 5 TABLET ORAL at 08:39

## 2021-01-01 RX ADMIN — BUDESONIDE 0.5 MG: 0.5 SUSPENSION RESPIRATORY (INHALATION) at 07:54

## 2021-01-01 RX ADMIN — METHIMAZOLE 5 MG: 5 TABLET ORAL at 05:39

## 2021-01-01 RX ADMIN — SEVELAMER CARBONATE 2400 MG: 800 TABLET, FILM COATED ORAL at 13:26

## 2021-01-01 RX ADMIN — HEPARIN SODIUM 5000 UNITS: 5000 INJECTION, SOLUTION INTRAVENOUS; SUBCUTANEOUS at 05:35

## 2021-01-01 RX ADMIN — CALCITRIOL CAPSULES 0.25 MCG 0.25 MCG: 0.25 CAPSULE ORAL at 04:54

## 2021-01-01 RX ADMIN — GABAPENTIN 100 MG: 100 CAPSULE ORAL at 17:57

## 2021-01-01 RX ADMIN — FUROSEMIDE 40 MG: 10 INJECTION, SOLUTION INTRAVENOUS at 06:30

## 2021-01-01 RX ADMIN — GABAPENTIN 300 MG: 300 CAPSULE ORAL at 17:17

## 2021-01-01 RX ADMIN — CLOPIDOGREL BISULFATE 75 MG: 75 TABLET ORAL at 05:59

## 2021-01-01 RX ADMIN — MIDODRINE HYDROCHLORIDE 10 MG: 5 TABLET ORAL at 20:37

## 2021-01-01 RX ADMIN — SEVELAMER CARBONATE 1600 MG: 800 TABLET, FILM COATED ORAL at 08:16

## 2021-01-01 RX ADMIN — ATORVASTATIN CALCIUM 40 MG: 40 TABLET, FILM COATED ORAL at 20:49

## 2021-01-01 RX ADMIN — APIXABAN 2.5 MG: 5 TABLET, FILM COATED ORAL at 08:34

## 2021-01-01 RX ADMIN — GUAIFENESIN 600 MG: 600 TABLET, EXTENDED RELEASE ORAL at 08:08

## 2021-01-01 RX ADMIN — EPOETIN ALFA-EPBX 10000 UNITS: 10000 INJECTION, SOLUTION INTRAVENOUS; SUBCUTANEOUS at 09:52

## 2021-01-01 RX ADMIN — SEVELAMER CARBONATE 2400 MG: 800 TABLET, FILM COATED ORAL at 13:41

## 2021-01-01 RX ADMIN — TRAZODONE HYDROCHLORIDE 50 MG: 50 TABLET ORAL at 20:00

## 2021-01-01 RX ADMIN — OMEPRAZOLE 20 MG: 20 CAPSULE, DELAYED RELEASE ORAL at 06:00

## 2021-01-01 RX ADMIN — GABAPENTIN 300 MG: 300 CAPSULE ORAL at 08:45

## 2021-01-01 RX ADMIN — VANCOMYCIN HYDROCHLORIDE 1750 MG: 500 INJECTION, POWDER, LYOPHILIZED, FOR SOLUTION INTRAVENOUS at 08:20

## 2021-01-01 RX ADMIN — PIPERACILLIN AND TAZOBACTAM 4.5 G: 4; .5 INJECTION, POWDER, LYOPHILIZED, FOR SOLUTION INTRAVENOUS; PARENTERAL at 13:05

## 2021-01-01 RX ADMIN — METHIMAZOLE 5 MG: 5 TABLET ORAL at 17:53

## 2021-01-01 RX ADMIN — INSULIN HUMAN 1 UNITS: 100 INJECTION, SOLUTION PARENTERAL at 11:40

## 2021-01-01 RX ADMIN — Medication 1334 MG: at 17:33

## 2021-01-01 RX ADMIN — CALCITRIOL CAPSULES 0.25 MCG 0.25 MCG: 0.25 CAPSULE ORAL at 08:21

## 2021-01-01 RX ADMIN — HEPARIN SODIUM 5000 UNITS: 5000 INJECTION, SOLUTION INTRAVENOUS; SUBCUTANEOUS at 20:53

## 2021-01-01 RX ADMIN — CALCITRIOL CAPSULES 0.25 MCG 0.25 MCG: 0.25 CAPSULE ORAL at 05:48

## 2021-01-01 RX ADMIN — APIXABAN 5 MG: 5 TABLET, FILM COATED ORAL at 05:47

## 2021-01-01 RX ADMIN — APIXABAN 2.5 MG: 5 TABLET, FILM COATED ORAL at 08:05

## 2021-01-01 RX ADMIN — ACETAMINOPHEN 650 MG: 325 TABLET, FILM COATED ORAL at 20:46

## 2021-01-01 RX ADMIN — INSULIN GLARGINE 5 UNITS: 100 INJECTION, SOLUTION SUBCUTANEOUS at 08:24

## 2021-01-01 RX ADMIN — METHIMAZOLE 7.5 MG: 5 TABLET ORAL at 05:45

## 2021-01-01 RX ADMIN — ACETAMINOPHEN 650 MG: 325 TABLET, FILM COATED ORAL at 23:18

## 2021-01-01 RX ADMIN — METHIMAZOLE 7.5 MG: 5 TABLET ORAL at 08:10

## 2021-01-01 RX ADMIN — GUAIFENESIN 600 MG: 600 TABLET, EXTENDED RELEASE ORAL at 00:06

## 2021-01-01 RX ADMIN — DOCUSATE SODIUM 50 MG AND SENNOSIDES 8.6 MG 2 TABLET: 8.6; 5 TABLET, FILM COATED ORAL at 05:37

## 2021-01-01 RX ADMIN — METHIMAZOLE 5 MG: 5 TABLET ORAL at 06:29

## 2021-01-01 RX ADMIN — OMEPRAZOLE 20 MG: 20 CAPSULE, DELAYED RELEASE ORAL at 06:54

## 2021-01-01 RX ADMIN — TAMSULOSIN HYDROCHLORIDE 0.8 MG: 0.4 CAPSULE ORAL at 20:30

## 2021-01-01 RX ADMIN — Medication 8 G: at 06:52

## 2021-01-01 RX ADMIN — CLOPIDOGREL BISULFATE 75 MG: 75 TABLET ORAL at 05:20

## 2021-01-01 RX ADMIN — CALCITRIOL CAPSULES 0.25 MCG 0.25 MCG: 0.25 CAPSULE ORAL at 18:01

## 2021-01-01 RX ADMIN — VERAPAMIL HYDROCHLORIDE 5 MG: 2.5 INJECTION, SOLUTION INTRAVENOUS at 11:09

## 2021-01-01 RX ADMIN — PIPERACILLIN SODIUM AND TAZOBACTAM SODIUM 3.38 G: 3; .375 INJECTION, POWDER, LYOPHILIZED, FOR SOLUTION INTRAVENOUS at 20:32

## 2021-01-01 RX ADMIN — METHIMAZOLE 5 MG: 5 TABLET ORAL at 18:03

## 2021-01-01 RX ADMIN — APIXABAN 5 MG: 5 TABLET, FILM COATED ORAL at 04:18

## 2021-01-01 RX ADMIN — MIDODRINE HYDROCHLORIDE 15 MG: 5 TABLET ORAL at 07:30

## 2021-01-01 RX ADMIN — CALCITRIOL CAPSULES 0.25 MCG 0.25 MCG: 0.25 CAPSULE ORAL at 17:33

## 2021-01-01 RX ADMIN — TRAMADOL HYDROCHLORIDE 25 MG: 50 TABLET, FILM COATED ORAL at 04:54

## 2021-01-01 RX ADMIN — OMEPRAZOLE 20 MG: 20 CAPSULE, DELAYED RELEASE ORAL at 05:25

## 2021-01-01 RX ADMIN — SEVELAMER CARBONATE 2400 MG: 800 TABLET, FILM COATED ORAL at 14:37

## 2021-01-01 RX ADMIN — INSULIN GLARGINE 8 UNITS: 100 INJECTION, SOLUTION SUBCUTANEOUS at 07:54

## 2021-01-01 RX ADMIN — GABAPENTIN 300 MG: 300 CAPSULE ORAL at 18:08

## 2021-01-01 RX ADMIN — TRAZODONE HYDROCHLORIDE 50 MG: 50 TABLET ORAL at 20:47

## 2021-01-01 RX ADMIN — DOCUSATE SODIUM 50 MG AND SENNOSIDES 8.6 MG 2 TABLET: 8.6; 5 TABLET, FILM COATED ORAL at 17:13

## 2021-01-01 RX ADMIN — HEPARIN SODIUM 1800 UNITS: 1000 INJECTION, SOLUTION INTRAVENOUS; SUBCUTANEOUS at 12:03

## 2021-01-01 RX ADMIN — PIPERACILLIN SODIUM AND TAZOBACTAM SODIUM 3.38 G: 3; .375 INJECTION, POWDER, LYOPHILIZED, FOR SOLUTION INTRAVENOUS at 09:58

## 2021-01-01 RX ADMIN — ATORVASTATIN CALCIUM 40 MG: 40 TABLET, FILM COATED ORAL at 19:23

## 2021-01-01 RX ADMIN — HEPARIN SODIUM 5000 UNITS: 5000 INJECTION, SOLUTION INTRAVENOUS; SUBCUTANEOUS at 06:09

## 2021-01-01 RX ADMIN — INSULIN GLARGINE 8 UNITS: 100 INJECTION, SOLUTION SUBCUTANEOUS at 20:47

## 2021-01-01 RX ADMIN — MIDODRINE HYDROCHLORIDE 15 MG: 5 TABLET ORAL at 13:44

## 2021-01-01 RX ADMIN — GUAIFENESIN 600 MG: 600 TABLET, EXTENDED RELEASE ORAL at 12:37

## 2021-01-01 RX ADMIN — CALCITRIOL CAPSULES 0.25 MCG 0.25 MCG: 0.25 CAPSULE ORAL at 05:05

## 2021-01-01 RX ADMIN — HEPARIN SODIUM 2000 UNITS: 1000 INJECTION, SOLUTION INTRAVENOUS; SUBCUTANEOUS at 15:36

## 2021-01-01 RX ADMIN — PIPERACILLIN AND TAZOBACTAM 4.5 G: 4; .5 INJECTION, POWDER, LYOPHILIZED, FOR SOLUTION INTRAVENOUS; PARENTERAL at 05:36

## 2021-01-01 RX ADMIN — APIXABAN 5 MG: 5 TABLET, FILM COATED ORAL at 12:41

## 2021-01-01 RX ADMIN — ACETAMINOPHEN 650 MG: 325 TABLET, FILM COATED ORAL at 21:01

## 2021-01-01 RX ADMIN — ATORVASTATIN CALCIUM 40 MG: 40 TABLET, FILM COATED ORAL at 20:28

## 2021-01-01 RX ADMIN — GUAIFENESIN 600 MG: 600 TABLET, EXTENDED RELEASE ORAL at 06:01

## 2021-01-01 RX ADMIN — PIPERACILLIN AND TAZOBACTAM 3.38 G: 3; .375 INJECTION, POWDER, LYOPHILIZED, FOR SOLUTION INTRAVENOUS; PARENTERAL at 13:09

## 2021-01-01 RX ADMIN — MICONAZOLE NITRATE: 20 CREAM TOPICAL at 21:38

## 2021-01-01 RX ADMIN — PIPERACILLIN SODIUM AND TAZOBACTAM SODIUM 3.38 G: 3; .375 INJECTION, POWDER, LYOPHILIZED, FOR SOLUTION INTRAVENOUS at 08:18

## 2021-01-01 RX ADMIN — ACETAMINOPHEN 1000 MG: 500 TABLET ORAL at 10:11

## 2021-01-01 RX ADMIN — TAMSULOSIN HYDROCHLORIDE 0.8 MG: 0.4 CAPSULE ORAL at 22:26

## 2021-01-01 RX ADMIN — METOPROLOL TARTRATE 37.5 MG: 25 TABLET, FILM COATED ORAL at 04:48

## 2021-01-01 RX ADMIN — TRAZODONE HYDROCHLORIDE 50 MG: 50 TABLET ORAL at 20:18

## 2021-01-01 RX ADMIN — PIPERACILLIN SODIUM AND TAZOBACTAM SODIUM 3.38 G: 3; .375 INJECTION, POWDER, LYOPHILIZED, FOR SOLUTION INTRAVENOUS at 00:58

## 2021-01-01 RX ADMIN — METHIMAZOLE 5 MG: 5 TABLET ORAL at 17:48

## 2021-01-01 RX ADMIN — TAMSULOSIN HYDROCHLORIDE 0.8 MG: 0.4 CAPSULE ORAL at 22:55

## 2021-01-01 RX ADMIN — MIDODRINE HYDROCHLORIDE 10 MG: 5 TABLET ORAL at 16:36

## 2021-01-01 RX ADMIN — DOCUSATE SODIUM 50 MG AND SENNOSIDES 8.6 MG 2 TABLET: 8.6; 5 TABLET, FILM COATED ORAL at 17:17

## 2021-01-01 RX ADMIN — CALCITRIOL CAPSULES 0.25 MCG 0.25 MCG: 0.25 CAPSULE ORAL at 06:29

## 2021-01-01 RX ADMIN — CALCITRIOL CAPSULES 0.25 MCG 0.25 MCG: 0.25 CAPSULE ORAL at 05:39

## 2021-01-01 RX ADMIN — APIXABAN 2.5 MG: 5 TABLET, FILM COATED ORAL at 06:01

## 2021-01-01 RX ADMIN — HYDROXYZINE HYDROCHLORIDE 50 MG: 25 TABLET, FILM COATED ORAL at 00:55

## 2021-01-01 RX ADMIN — APIXABAN 2.5 MG: 5 TABLET, FILM COATED ORAL at 10:26

## 2021-01-01 RX ADMIN — DOCUSATE SODIUM 50 MG AND SENNOSIDES 8.6 MG 2 TABLET: 8.6; 5 TABLET, FILM COATED ORAL at 05:47

## 2021-01-01 RX ADMIN — OMEPRAZOLE 20 MG: 20 CAPSULE, DELAYED RELEASE ORAL at 08:33

## 2021-01-01 RX ADMIN — BUDESONIDE 0.5 MG: 0.5 SUSPENSION RESPIRATORY (INHALATION) at 21:52

## 2021-01-01 RX ADMIN — INSULIN LISPRO 1 UNITS: 100 INJECTION, SOLUTION INTRAVENOUS; SUBCUTANEOUS at 17:53

## 2021-01-01 RX ADMIN — APIXABAN 5 MG: 5 TABLET, FILM COATED ORAL at 04:07

## 2021-01-01 RX ADMIN — SEVELAMER CARBONATE 2400 MG: 800 TABLET, FILM COATED ORAL at 12:51

## 2021-01-01 RX ADMIN — APIXABAN 5 MG: 5 TABLET, FILM COATED ORAL at 16:53

## 2021-01-01 RX ADMIN — METHIMAZOLE 5 MG: 5 TABLET ORAL at 06:06

## 2021-01-01 RX ADMIN — CALCITRIOL CAPSULES 0.25 MCG 0.25 MCG: 0.25 CAPSULE ORAL at 06:00

## 2021-01-01 RX ADMIN — EPOETIN ALFA-EPBX 4000 UNITS: 4000 INJECTION, SOLUTION INTRAVENOUS; SUBCUTANEOUS at 12:22

## 2021-01-01 RX ADMIN — CALCITRIOL CAPSULES 0.25 MCG 0.25 MCG: 0.25 CAPSULE ORAL at 05:19

## 2021-01-01 RX ADMIN — SEVELAMER CARBONATE 1600 MG: 800 TABLET, FILM COATED ORAL at 09:32

## 2021-01-01 RX ADMIN — DOCUSATE SODIUM 50 MG AND SENNOSIDES 8.6 MG 2 TABLET: 8.6; 5 TABLET, FILM COATED ORAL at 18:31

## 2021-01-01 RX ADMIN — METHIMAZOLE 5 MG: 5 TABLET ORAL at 17:15

## 2021-01-01 RX ADMIN — SEVELAMER CARBONATE 1600 MG: 800 TABLET, FILM COATED ORAL at 10:48

## 2021-01-01 RX ADMIN — Medication 1334 MG: at 13:33

## 2021-01-01 RX ADMIN — CALCITRIOL CAPSULES 0.25 MCG 0.25 MCG: 0.25 CAPSULE ORAL at 06:24

## 2021-01-01 RX ADMIN — ACETAMINOPHEN 1000 MG: 500 TABLET ORAL at 12:07

## 2021-01-01 RX ADMIN — CALCITRIOL CAPSULES 0.25 MCG 0.25 MCG: 0.25 CAPSULE ORAL at 08:39

## 2021-01-01 RX ADMIN — SEVELAMER CARBONATE 1600 MG: 800 TABLET, FILM COATED ORAL at 08:04

## 2021-01-01 RX ADMIN — INSULIN GLARGINE 8 UNITS: 100 INJECTION, SOLUTION SUBCUTANEOUS at 20:12

## 2021-01-01 RX ADMIN — INSULIN GLARGINE 10 UNITS: 100 INJECTION, SOLUTION SUBCUTANEOUS at 21:33

## 2021-01-01 RX ADMIN — GABAPENTIN 300 MG: 300 CAPSULE ORAL at 17:13

## 2021-01-01 RX ADMIN — TAMSULOSIN HYDROCHLORIDE 0.8 MG: 0.4 CAPSULE ORAL at 20:52

## 2021-01-01 RX ADMIN — HEPARIN SODIUM 1500 UNITS: 1000 INJECTION, SOLUTION INTRAVENOUS; SUBCUTANEOUS at 12:18

## 2021-01-01 RX ADMIN — METHIMAZOLE 5 MG: 5 TABLET ORAL at 05:20

## 2021-01-01 RX ADMIN — METHIMAZOLE 5 MG: 5 TABLET ORAL at 20:48

## 2021-01-01 RX ADMIN — INSULIN LISPRO 3 UNITS: 100 INJECTION, SOLUTION INTRAVENOUS; SUBCUTANEOUS at 17:04

## 2021-01-01 RX ADMIN — MIDODRINE HYDROCHLORIDE 10 MG: 5 TABLET ORAL at 18:01

## 2021-01-01 RX ADMIN — CALCITRIOL CAPSULES 0.25 MCG 0.25 MCG: 0.25 CAPSULE ORAL at 04:11

## 2021-01-01 RX ADMIN — HEPARIN SODIUM 5000 UNITS: 5000 INJECTION, SOLUTION INTRAVENOUS; SUBCUTANEOUS at 06:34

## 2021-01-01 RX ADMIN — MIDODRINE HYDROCHLORIDE 15 MG: 5 TABLET ORAL at 17:13

## 2021-01-01 RX ADMIN — INSULIN GLARGINE 8 UNITS: 100 INJECTION, SOLUTION SUBCUTANEOUS at 08:08

## 2021-01-01 RX ADMIN — Medication 1334 MG: at 11:35

## 2021-01-01 RX ADMIN — TAMSULOSIN HYDROCHLORIDE 0.8 MG: 0.4 CAPSULE ORAL at 21:29

## 2021-01-01 RX ADMIN — SEVELAMER CARBONATE 2400 MG: 800 TABLET, FILM COATED ORAL at 18:30

## 2021-01-01 RX ADMIN — SEVELAMER CARBONATE 1600 MG: 800 TABLET, FILM COATED ORAL at 09:43

## 2021-01-01 RX ADMIN — GUAIFENESIN 600 MG: 600 TABLET, EXTENDED RELEASE ORAL at 23:09

## 2021-01-01 RX ADMIN — INSULIN GLARGINE 5 UNITS: 100 INJECTION, SOLUTION SUBCUTANEOUS at 22:41

## 2021-01-01 RX ADMIN — LIDOCAINE HYDROCHLORIDE 1 DOSE: 20 JELLY TOPICAL at 14:48

## 2021-01-01 RX ADMIN — METHIMAZOLE 5 MG: 5 TABLET ORAL at 21:39

## 2021-01-01 RX ADMIN — GUAIFENESIN 600 MG: 600 TABLET, EXTENDED RELEASE ORAL at 05:55

## 2021-01-01 RX ADMIN — HEPARIN SODIUM 2000 UNITS: 1000 INJECTION, SOLUTION INTRAVENOUS; SUBCUTANEOUS at 07:30

## 2021-01-01 RX ADMIN — MIDODRINE HYDROCHLORIDE 10 MG: 5 TABLET ORAL at 09:06

## 2021-01-01 RX ADMIN — METHIMAZOLE 5 MG: 5 TABLET ORAL at 06:46

## 2021-01-01 RX ADMIN — EPOETIN ALFA-EPBX 6000 UNITS: 3000 INJECTION, SOLUTION INTRAVENOUS; SUBCUTANEOUS at 12:40

## 2021-01-01 RX ADMIN — METHIMAZOLE 5 MG: 5 TABLET ORAL at 16:58

## 2021-01-01 RX ADMIN — INSULIN LISPRO 3 UNITS: 100 INJECTION, SOLUTION INTRAVENOUS; SUBCUTANEOUS at 12:02

## 2021-01-01 RX ADMIN — CLOPIDOGREL BISULFATE 75 MG: 75 TABLET ORAL at 05:32

## 2021-01-01 RX ADMIN — ATORVASTATIN CALCIUM 40 MG: 40 TABLET, FILM COATED ORAL at 20:48

## 2021-01-01 RX ADMIN — APIXABAN 2.5 MG: 5 TABLET, FILM COATED ORAL at 21:16

## 2021-01-01 RX ADMIN — HEPARIN SODIUM 1800 UNITS: 1000 INJECTION, SOLUTION INTRAVENOUS; SUBCUTANEOUS at 16:35

## 2021-01-01 RX ADMIN — TRAMADOL HYDROCHLORIDE 25 MG: 50 TABLET, FILM COATED ORAL at 16:02

## 2021-01-01 RX ADMIN — METOPROLOL TARTRATE 37.5 MG: 25 TABLET, FILM COATED ORAL at 17:56

## 2021-01-01 RX ADMIN — MIDODRINE HYDROCHLORIDE 10 MG: 5 TABLET ORAL at 12:07

## 2021-01-01 RX ADMIN — ATORVASTATIN CALCIUM 40 MG: 40 TABLET, FILM COATED ORAL at 20:41

## 2021-01-01 RX ADMIN — OMEPRAZOLE 20 MG: 20 CAPSULE, DELAYED RELEASE ORAL at 17:15

## 2021-01-01 RX ADMIN — OMEPRAZOLE 20 MG: 20 CAPSULE, DELAYED RELEASE ORAL at 05:55

## 2021-01-01 RX ADMIN — METHIMAZOLE 5 MG: 5 TABLET ORAL at 21:21

## 2021-01-01 RX ADMIN — APIXABAN 2.5 MG: 5 TABLET, FILM COATED ORAL at 21:13

## 2021-01-01 RX ADMIN — ACETAMINOPHEN 1000 MG: 500 TABLET ORAL at 16:34

## 2021-01-01 RX ADMIN — TAMSULOSIN HYDROCHLORIDE 0.8 MG: 0.4 CAPSULE ORAL at 21:24

## 2021-01-01 RX ADMIN — HEPARIN SODIUM 5000 UNITS: 5000 INJECTION, SOLUTION INTRAVENOUS; SUBCUTANEOUS at 21:19

## 2021-01-01 RX ADMIN — OMEPRAZOLE 20 MG: 20 CAPSULE, DELAYED RELEASE ORAL at 21:21

## 2021-01-01 RX ADMIN — DOCUSATE SODIUM 50 MG AND SENNOSIDES 8.6 MG 2 TABLET: 8.6; 5 TABLET, FILM COATED ORAL at 17:14

## 2021-01-01 RX ADMIN — SEVELAMER CARBONATE 1600 MG: 800 TABLET, FILM COATED ORAL at 13:44

## 2021-01-01 RX ADMIN — OMEPRAZOLE 20 MG: 20 CAPSULE, DELAYED RELEASE ORAL at 08:06

## 2021-01-01 RX ADMIN — CALCITRIOL CAPSULES 0.25 MCG 0.25 MCG: 0.25 CAPSULE ORAL at 05:37

## 2021-01-01 RX ADMIN — GABAPENTIN 300 MG: 300 CAPSULE ORAL at 21:47

## 2021-01-01 RX ADMIN — MICONAZOLE NITRATE: 20 CREAM TOPICAL at 08:38

## 2021-01-01 RX ADMIN — GABAPENTIN 300 MG: 300 CAPSULE ORAL at 17:36

## 2021-01-01 RX ADMIN — CLOPIDOGREL BISULFATE 75 MG: 75 TABLET ORAL at 06:11

## 2021-01-01 RX ADMIN — APIXABAN 5 MG: 5 TABLET, FILM COATED ORAL at 21:46

## 2021-01-01 RX ADMIN — MIDODRINE HYDROCHLORIDE 10 MG: 5 TABLET ORAL at 20:22

## 2021-01-01 RX ADMIN — SEVELAMER CARBONATE 2400 MG: 800 TABLET, FILM COATED ORAL at 08:49

## 2021-01-01 RX ADMIN — APIXABAN 2.5 MG: 5 TABLET, FILM COATED ORAL at 08:19

## 2021-01-01 RX ADMIN — GUAIFENESIN 600 MG: 600 TABLET, EXTENDED RELEASE ORAL at 17:31

## 2021-01-01 RX ADMIN — METHIMAZOLE 5 MG: 5 TABLET ORAL at 08:49

## 2021-01-01 RX ADMIN — ATORVASTATIN CALCIUM 40 MG: 40 TABLET, FILM COATED ORAL at 21:56

## 2021-01-01 RX ADMIN — TAMSULOSIN HYDROCHLORIDE 0.8 MG: 0.4 CAPSULE ORAL at 20:56

## 2021-01-01 RX ADMIN — KETOROLAC TROMETHAMINE 9.99 MG: 30 INJECTION, SOLUTION INTRAMUSCULAR; INTRAVENOUS at 00:58

## 2021-01-01 RX ADMIN — SEVELAMER CARBONATE 800 MG: 800 TABLET, FILM COATED ORAL at 13:06

## 2021-01-01 RX ADMIN — METHIMAZOLE 5 MG: 5 TABLET ORAL at 20:29

## 2021-01-01 RX ADMIN — MIDODRINE HYDROCHLORIDE 5 MG: 5 TABLET ORAL at 09:57

## 2021-01-01 RX ADMIN — METHIMAZOLE 5 MG: 5 TABLET ORAL at 17:37

## 2021-01-01 RX ADMIN — DOCUSATE SODIUM 50 MG AND SENNOSIDES 8.6 MG 2 TABLET: 8.6; 5 TABLET, FILM COATED ORAL at 17:00

## 2021-01-01 RX ADMIN — CALCITRIOL CAPSULES 0.25 MCG 0.25 MCG: 0.25 CAPSULE ORAL at 05:52

## 2021-01-01 RX ADMIN — EPOETIN ALFA-EPBX 4000 UNITS: 4000 INJECTION, SOLUTION INTRAVENOUS; SUBCUTANEOUS at 15:36

## 2021-01-01 RX ADMIN — SEVELAMER CARBONATE 1600 MG: 800 TABLET, FILM COATED ORAL at 11:07

## 2021-01-01 RX ADMIN — CLOPIDOGREL BISULFATE 75 MG: 75 TABLET ORAL at 06:09

## 2021-01-01 RX ADMIN — SEVELAMER CARBONATE 1600 MG: 800 TABLET, FILM COATED ORAL at 08:11

## 2021-01-01 RX ADMIN — METOPROLOL TARTRATE 12.5 MG: 25 TABLET, FILM COATED ORAL at 04:56

## 2021-01-01 RX ADMIN — CALCITRIOL CAPSULES 0.25 MCG 0.25 MCG: 0.25 CAPSULE ORAL at 05:28

## 2021-01-01 RX ADMIN — METHIMAZOLE 5 MG: 5 TABLET ORAL at 21:33

## 2021-01-01 RX ADMIN — GABAPENTIN 300 MG: 300 CAPSULE ORAL at 18:38

## 2021-01-01 RX ADMIN — ACETAMINOPHEN 1000 MG: 500 TABLET ORAL at 04:12

## 2021-01-01 RX ADMIN — ACETAMINOPHEN 1000 MG: 500 TABLET ORAL at 22:25

## 2021-01-01 RX ADMIN — METHIMAZOLE 5 MG: 5 TABLET ORAL at 20:05

## 2021-01-01 RX ADMIN — MIDODRINE HYDROCHLORIDE 10 MG: 5 TABLET ORAL at 17:36

## 2021-01-01 RX ADMIN — MIDODRINE HYDROCHLORIDE 10 MG: 5 TABLET ORAL at 12:58

## 2021-01-01 RX ADMIN — OMEPRAZOLE 20 MG: 20 CAPSULE, DELAYED RELEASE ORAL at 04:42

## 2021-01-01 RX ADMIN — APIXABAN 5 MG: 5 TABLET, FILM COATED ORAL at 17:58

## 2021-01-01 RX ADMIN — CLOPIDOGREL BISULFATE 600 MG: 300 TABLET, FILM COATED ORAL at 17:13

## 2021-01-01 RX ADMIN — INSULIN GLARGINE 8 UNITS: 100 INJECTION, SOLUTION SUBCUTANEOUS at 20:49

## 2021-01-01 RX ADMIN — Medication 1334 MG: at 07:31

## 2021-01-01 RX ADMIN — MIDODRINE HYDROCHLORIDE 5 MG: 5 TABLET ORAL at 16:53

## 2021-01-01 RX ADMIN — CALCITRIOL CAPSULES 0.25 MCG 0.25 MCG: 0.25 CAPSULE ORAL at 06:10

## 2021-01-01 RX ADMIN — TAMSULOSIN HYDROCHLORIDE 0.4 MG: 0.4 CAPSULE ORAL at 20:42

## 2021-01-01 RX ADMIN — ACETAMINOPHEN 1000 MG: 500 TABLET ORAL at 21:46

## 2021-01-01 RX ADMIN — APIXABAN 5 MG: 5 TABLET, FILM COATED ORAL at 17:29

## 2021-01-01 RX ADMIN — MIDODRINE HYDROCHLORIDE 10 MG: 5 TABLET ORAL at 21:16

## 2021-01-01 RX ADMIN — MIDODRINE HYDROCHLORIDE 10 MG: 5 TABLET ORAL at 08:25

## 2021-01-01 RX ADMIN — SEVELAMER CARBONATE 2400 MG: 800 TABLET, FILM COATED ORAL at 17:00

## 2021-01-01 RX ADMIN — APIXABAN 2.5 MG: 5 TABLET, FILM COATED ORAL at 21:22

## 2021-01-01 RX ADMIN — ACETAMINOPHEN 1000 MG: 500 TABLET ORAL at 08:49

## 2021-01-01 RX ADMIN — METHIMAZOLE 5 MG: 5 TABLET ORAL at 05:59

## 2021-01-01 RX ADMIN — INSULIN GLARGINE 8 UNITS: 100 INJECTION, SOLUTION SUBCUTANEOUS at 07:06

## 2021-01-01 RX ADMIN — INSULIN HUMAN 1 UNITS: 100 INJECTION, SOLUTION PARENTERAL at 17:16

## 2021-01-01 RX ADMIN — ACETAMINOPHEN 1000 MG: 500 TABLET ORAL at 13:43

## 2021-01-01 RX ADMIN — HEPARIN SODIUM 1500 UNITS: 1000 INJECTION, SOLUTION INTRAVENOUS; SUBCUTANEOUS at 15:18

## 2021-01-01 RX ADMIN — NOREPINEPHRINE BITARTRATE 2 MCG/MIN: 1 INJECTION, SOLUTION, CONCENTRATE INTRAVENOUS at 08:04

## 2021-01-01 RX ADMIN — INSULIN GLARGINE 10 UNITS: 100 INJECTION, SOLUTION SUBCUTANEOUS at 17:11

## 2021-01-01 RX ADMIN — EPOETIN ALFA-EPBX 4000 UNITS: 4000 INJECTION, SOLUTION INTRAVENOUS; SUBCUTANEOUS at 13:15

## 2021-01-01 RX ADMIN — SEVELAMER CARBONATE 2400 MG: 800 TABLET, FILM COATED ORAL at 08:59

## 2021-01-01 RX ADMIN — ATORVASTATIN CALCIUM 40 MG: 40 TABLET, FILM COATED ORAL at 21:32

## 2021-01-01 RX ADMIN — ACETAMINOPHEN 650 MG: 325 TABLET, FILM COATED ORAL at 12:42

## 2021-01-01 RX ADMIN — GABAPENTIN 300 MG: 300 CAPSULE ORAL at 13:44

## 2021-01-01 RX ADMIN — MIDODRINE HYDROCHLORIDE 10 MG: 5 TABLET ORAL at 13:33

## 2021-01-01 RX ADMIN — DEXTROSE MONOHYDRATE 50 ML: 25 INJECTION, SOLUTION INTRAVENOUS at 06:46

## 2021-01-01 RX ADMIN — INSULIN GLARGINE 10 UNITS: 100 INJECTION, SOLUTION SUBCUTANEOUS at 21:03

## 2021-01-01 RX ADMIN — OMEPRAZOLE 20 MG: 20 CAPSULE, DELAYED RELEASE ORAL at 06:02

## 2021-01-01 RX ADMIN — METOPROLOL TARTRATE 37.5 MG: 25 TABLET, FILM COATED ORAL at 17:33

## 2021-01-01 RX ADMIN — IOHEXOL 83 ML: 350 INJECTION, SOLUTION INTRAVENOUS at 11:30

## 2021-01-01 RX ADMIN — METOPROLOL TARTRATE 37.5 MG: 25 TABLET, FILM COATED ORAL at 05:42

## 2021-01-01 RX ADMIN — PIPERACILLIN AND TAZOBACTAM 3.38 G: 3; .375 INJECTION, POWDER, LYOPHILIZED, FOR SOLUTION INTRAVENOUS; PARENTERAL at 20:52

## 2021-01-01 RX ADMIN — ACETAMINOPHEN 650 MG: 325 TABLET, FILM COATED ORAL at 20:49

## 2021-01-01 RX ADMIN — GUAIFENESIN 600 MG: 600 TABLET, EXTENDED RELEASE ORAL at 20:52

## 2021-01-01 RX ADMIN — METHIMAZOLE 5 MG: 5 TABLET ORAL at 06:18

## 2021-01-01 RX ADMIN — GABAPENTIN 300 MG: 300 CAPSULE ORAL at 05:56

## 2021-01-01 RX ADMIN — MEROPENEM 500 MG: 500 INJECTION, POWDER, FOR SOLUTION INTRAVENOUS at 06:21

## 2021-01-01 RX ADMIN — CALCITRIOL CAPSULES 0.25 MCG 0.25 MCG: 0.25 CAPSULE ORAL at 06:16

## 2021-01-01 RX ADMIN — HEPARIN SODIUM 5000 UNITS: 5000 INJECTION, SOLUTION INTRAVENOUS; SUBCUTANEOUS at 20:57

## 2021-01-01 RX ADMIN — TAMSULOSIN HYDROCHLORIDE 0.8 MG: 0.4 CAPSULE ORAL at 20:37

## 2021-01-01 RX ADMIN — HEPARIN SODIUM 3700 UNITS: 1000 INJECTION, SOLUTION INTRAVENOUS; SUBCUTANEOUS at 10:30

## 2021-01-01 RX ADMIN — DOCUSATE SODIUM 50 MG AND SENNOSIDES 8.6 MG 2 TABLET: 8.6; 5 TABLET, FILM COATED ORAL at 16:58

## 2021-01-01 RX ADMIN — ACETAMINOPHEN 1000 MG: 500 TABLET ORAL at 13:51

## 2021-01-01 RX ADMIN — SEVELAMER CARBONATE 1600 MG: 800 TABLET, FILM COATED ORAL at 12:52

## 2021-01-01 RX ADMIN — GABAPENTIN 300 MG: 300 CAPSULE ORAL at 18:30

## 2021-01-01 RX ADMIN — MIDODRINE HYDROCHLORIDE 10 MG: 5 TABLET ORAL at 12:44

## 2021-01-01 RX ADMIN — HEPARIN SODIUM 5000 UNITS: 5000 INJECTION, SOLUTION INTRAVENOUS; SUBCUTANEOUS at 14:11

## 2021-01-01 RX ADMIN — EPOETIN ALFA-EPBX 10000 UNITS: 10000 INJECTION, SOLUTION INTRAVENOUS; SUBCUTANEOUS at 15:40

## 2021-01-01 RX ADMIN — DIBASIC SODIUM PHOSPHATE, MONOBASIC POTASSIUM PHOSPHATE AND MONOBASIC SODIUM PHOSPHATE 250 MG: 852; 155; 130 TABLET ORAL at 08:45

## 2021-01-01 RX ADMIN — METHIMAZOLE 5 MG: 5 TABLET ORAL at 21:52

## 2021-01-01 RX ADMIN — CALCITRIOL CAPSULES 0.25 MCG 0.25 MCG: 0.25 CAPSULE ORAL at 08:06

## 2021-01-01 RX ADMIN — TRAMADOL HYDROCHLORIDE 100 MG: 50 TABLET, FILM COATED ORAL at 16:51

## 2021-01-01 RX ADMIN — METHIMAZOLE 5 MG: 5 TABLET ORAL at 20:10

## 2021-01-01 RX ADMIN — MICONAZOLE NITRATE: 20 CREAM TOPICAL at 08:19

## 2021-01-01 RX ADMIN — MIDODRINE HYDROCHLORIDE 10 MG: 5 TABLET ORAL at 08:22

## 2021-01-01 RX ADMIN — APIXABAN 2.5 MG: 5 TABLET, FILM COATED ORAL at 08:06

## 2021-01-01 RX ADMIN — CALCITRIOL CAPSULES 0.25 MCG 0.25 MCG: 0.25 CAPSULE ORAL at 18:26

## 2021-01-01 RX ADMIN — Medication 1334 MG: at 17:30

## 2021-01-01 RX ADMIN — ATORVASTATIN CALCIUM 40 MG: 40 TABLET, FILM COATED ORAL at 19:48

## 2021-01-01 RX ADMIN — APIXABAN 5 MG: 5 TABLET, FILM COATED ORAL at 05:13

## 2021-01-01 RX ADMIN — TAMSULOSIN HYDROCHLORIDE 0.8 MG: 0.4 CAPSULE ORAL at 20:57

## 2021-01-01 RX ADMIN — MIDODRINE HYDROCHLORIDE 10 MG: 5 TABLET ORAL at 11:03

## 2021-01-01 RX ADMIN — CALCITRIOL CAPSULES 0.25 MCG 0.25 MCG: 0.25 CAPSULE ORAL at 08:45

## 2021-01-01 RX ADMIN — HEPARIN SODIUM 1800 UNITS: 1000 INJECTION, SOLUTION INTRAVENOUS; SUBCUTANEOUS at 17:33

## 2021-01-01 RX ADMIN — TRAZODONE HYDROCHLORIDE 50 MG: 50 TABLET ORAL at 19:23

## 2021-01-01 RX ADMIN — METOPROLOL TARTRATE 37.5 MG: 25 TABLET, FILM COATED ORAL at 06:00

## 2021-01-01 RX ADMIN — HEPARIN SODIUM 5000 UNITS: 5000 INJECTION, SOLUTION INTRAVENOUS; SUBCUTANEOUS at 05:56

## 2021-01-01 RX ADMIN — METOPROLOL TARTRATE 37.5 MG: 25 TABLET, FILM COATED ORAL at 17:12

## 2021-01-01 RX ADMIN — DOCUSATE SODIUM 50 MG AND SENNOSIDES 8.6 MG 2 TABLET: 8.6; 5 TABLET, FILM COATED ORAL at 20:41

## 2021-01-01 RX ADMIN — SEVELAMER CARBONATE 2400 MG: 800 TABLET, FILM COATED ORAL at 16:51

## 2021-01-01 RX ADMIN — GABAPENTIN 100 MG: 100 CAPSULE ORAL at 17:00

## 2021-01-01 RX ADMIN — SEVELAMER CARBONATE 2400 MG: 800 TABLET, FILM COATED ORAL at 06:45

## 2021-01-01 RX ADMIN — SEVELAMER CARBONATE 1600 MG: 800 TABLET, FILM COATED ORAL at 08:45

## 2021-01-01 RX ADMIN — GABAPENTIN 300 MG: 300 CAPSULE ORAL at 08:04

## 2021-01-01 RX ADMIN — GABAPENTIN 300 MG: 300 CAPSULE ORAL at 08:19

## 2021-01-01 RX ADMIN — MIDODRINE HYDROCHLORIDE 10 MG: 5 TABLET ORAL at 17:33

## 2021-01-01 RX ADMIN — ACETAMINOPHEN 650 MG: 325 TABLET, FILM COATED ORAL at 14:10

## 2021-01-01 RX ADMIN — METHIMAZOLE 5 MG: 5 TABLET ORAL at 20:26

## 2021-01-01 RX ADMIN — METHIMAZOLE 5 MG: 5 TABLET ORAL at 23:33

## 2021-01-01 RX ADMIN — METHIMAZOLE 5 MG: 5 TABLET ORAL at 05:56

## 2021-01-01 RX ADMIN — Medication 1334 MG: at 17:53

## 2021-01-01 RX ADMIN — CLOPIDOGREL BISULFATE 75 MG: 75 TABLET ORAL at 05:55

## 2021-01-01 RX ADMIN — METHIMAZOLE 5 MG: 5 TABLET ORAL at 05:09

## 2021-01-01 RX ADMIN — ACETAMINOPHEN 500 MG: 500 TABLET ORAL at 09:04

## 2021-01-01 RX ADMIN — Medication 1334 MG: at 17:16

## 2021-01-01 RX ADMIN — OMEPRAZOLE 20 MG: 20 CAPSULE, DELAYED RELEASE ORAL at 10:26

## 2021-01-01 RX ADMIN — MIDODRINE HYDROCHLORIDE 10 MG: 5 TABLET ORAL at 17:29

## 2021-01-01 RX ADMIN — APIXABAN 5 MG: 5 TABLET, FILM COATED ORAL at 17:00

## 2021-01-01 RX ADMIN — TRAZODONE HYDROCHLORIDE 50 MG: 50 TABLET ORAL at 21:30

## 2021-01-01 RX ADMIN — CARVEDILOL 3.12 MG: 3.12 TABLET, FILM COATED ORAL at 17:11

## 2021-01-01 RX ADMIN — ATORVASTATIN CALCIUM 40 MG: 40 TABLET, FILM COATED ORAL at 21:21

## 2021-01-01 RX ADMIN — CALCITRIOL CAPSULES 0.25 MCG 0.25 MCG: 0.25 CAPSULE ORAL at 05:58

## 2021-01-01 RX ADMIN — METOPROLOL TARTRATE 12.5 MG: 25 TABLET, FILM COATED ORAL at 17:21

## 2021-01-01 RX ADMIN — CLOPIDOGREL BISULFATE 75 MG: 75 TABLET ORAL at 06:54

## 2021-01-01 RX ADMIN — ACETAMINOPHEN 1000 MG: 500 TABLET ORAL at 15:20

## 2021-01-01 RX ADMIN — CALCITRIOL CAPSULES 0.25 MCG 0.25 MCG: 0.25 CAPSULE ORAL at 05:27

## 2021-01-01 RX ADMIN — ASPIRIN 81 MG: 81 TABLET, COATED ORAL at 05:54

## 2021-01-01 RX ADMIN — PIPERACILLIN SODIUM AND TAZOBACTAM SODIUM 3.38 G: 3; .375 INJECTION, POWDER, LYOPHILIZED, FOR SOLUTION INTRAVENOUS at 20:09

## 2021-01-01 RX ADMIN — HEPARIN SODIUM 3700 UNITS: 1000 INJECTION, SOLUTION INTRAVENOUS; SUBCUTANEOUS at 15:40

## 2021-01-01 RX ADMIN — METHIMAZOLE 5 MG: 5 TABLET ORAL at 17:45

## 2021-01-01 RX ADMIN — ATORVASTATIN CALCIUM 40 MG: 40 TABLET, FILM COATED ORAL at 20:14

## 2021-01-01 RX ADMIN — TRAMADOL HYDROCHLORIDE 50 MG: 50 TABLET, FILM COATED ORAL at 19:17

## 2021-01-01 RX ADMIN — TAMSULOSIN HYDROCHLORIDE 0.8 MG: 0.4 CAPSULE ORAL at 19:59

## 2021-01-01 RX ADMIN — APIXABAN 2.5 MG: 5 TABLET, FILM COATED ORAL at 21:52

## 2021-01-01 RX ADMIN — FLUCONAZOLE 200 MG: 100 TABLET ORAL at 08:20

## 2021-01-01 RX ADMIN — ATORVASTATIN CALCIUM 40 MG: 40 TABLET, FILM COATED ORAL at 20:50

## 2021-01-01 RX ADMIN — GABAPENTIN 300 MG: 300 CAPSULE ORAL at 13:07

## 2021-01-01 RX ADMIN — TRAZODONE HYDROCHLORIDE 50 MG: 50 TABLET ORAL at 20:57

## 2021-01-01 RX ADMIN — GUAIFENESIN 600 MG: 600 TABLET, EXTENDED RELEASE ORAL at 18:30

## 2021-01-01 RX ADMIN — MIDODRINE HYDROCHLORIDE 15 MG: 5 TABLET ORAL at 07:35

## 2021-01-01 RX ADMIN — METHIMAZOLE 5 MG: 5 TABLET ORAL at 18:26

## 2021-01-01 RX ADMIN — APIXABAN 5 MG: 5 TABLET, FILM COATED ORAL at 17:18

## 2021-01-01 RX ADMIN — ATORVASTATIN CALCIUM 40 MG: 40 TABLET, FILM COATED ORAL at 22:25

## 2021-01-01 RX ADMIN — SEVELAMER CARBONATE 1600 MG: 800 TABLET, FILM COATED ORAL at 17:12

## 2021-01-01 RX ADMIN — ATORVASTATIN CALCIUM 40 MG: 40 TABLET, FILM COATED ORAL at 21:30

## 2021-01-01 RX ADMIN — ACETAMINOPHEN 1000 MG: 500 TABLET ORAL at 09:28

## 2021-01-01 RX ADMIN — HEPARIN SODIUM 5000 UNITS: 5000 INJECTION, SOLUTION INTRAVENOUS; SUBCUTANEOUS at 12:29

## 2021-01-01 RX ADMIN — ACETAMINOPHEN 1000 MG: 500 TABLET ORAL at 04:11

## 2021-01-01 RX ADMIN — METHIMAZOLE 5 MG: 5 TABLET ORAL at 18:55

## 2021-01-01 RX ADMIN — SEVELAMER CARBONATE 1600 MG: 800 TABLET, FILM COATED ORAL at 17:13

## 2021-01-01 RX ADMIN — GUAIFENESIN 600 MG: 600 TABLET, EXTENDED RELEASE ORAL at 05:54

## 2021-01-01 RX ADMIN — DOCUSATE SODIUM 50 MG AND SENNOSIDES 8.6 MG 2 TABLET: 8.6; 5 TABLET, FILM COATED ORAL at 18:21

## 2021-01-01 RX ADMIN — OMEPRAZOLE 20 MG: 20 CAPSULE, DELAYED RELEASE ORAL at 08:21

## 2021-01-01 RX ADMIN — TRAZODONE HYDROCHLORIDE 50 MG: 50 TABLET ORAL at 20:39

## 2021-01-01 RX ADMIN — MIDODRINE HYDROCHLORIDE 10 MG: 5 TABLET ORAL at 08:19

## 2021-01-01 RX ADMIN — SODIUM CHLORIDE 250 ML: 9 INJECTION, SOLUTION INTRAVENOUS at 21:48

## 2021-01-01 RX ADMIN — GUAIFENESIN 600 MG: 600 TABLET, EXTENDED RELEASE ORAL at 00:28

## 2021-01-01 RX ADMIN — TRAZODONE HYDROCHLORIDE 50 MG: 50 TABLET ORAL at 21:12

## 2021-01-01 RX ADMIN — INSULIN GLARGINE 5 UNITS: 100 INJECTION, SOLUTION SUBCUTANEOUS at 21:52

## 2021-01-01 RX ADMIN — MIDODRINE HYDROCHLORIDE 10 MG: 5 TABLET ORAL at 09:03

## 2021-01-01 RX ADMIN — SEVELAMER CARBONATE 1600 MG: 800 TABLET, FILM COATED ORAL at 17:33

## 2021-01-01 RX ADMIN — GABAPENTIN 300 MG: 300 CAPSULE ORAL at 06:24

## 2021-01-01 RX ADMIN — INSULIN GLARGINE 10 UNITS: 100 INJECTION, SOLUTION SUBCUTANEOUS at 23:38

## 2021-01-01 RX ADMIN — BUDESONIDE 0.5 MG: 0.5 SUSPENSION RESPIRATORY (INHALATION) at 07:13

## 2021-01-01 RX ADMIN — ATORVASTATIN CALCIUM 40 MG: 40 TABLET, FILM COATED ORAL at 21:49

## 2021-01-01 RX ADMIN — MICONAZOLE NITRATE: 20 CREAM TOPICAL at 16:00

## 2021-01-01 RX ADMIN — SODIUM CHLORIDE 500 ML: 9 INJECTION, SOLUTION INTRAVENOUS at 18:21

## 2021-01-01 RX ADMIN — ATORVASTATIN CALCIUM 40 MG: 40 TABLET, FILM COATED ORAL at 21:16

## 2021-01-01 RX ADMIN — INSULIN LISPRO 1 UNITS: 100 INJECTION, SOLUTION INTRAVENOUS; SUBCUTANEOUS at 18:31

## 2021-01-01 RX ADMIN — OMEPRAZOLE 20 MG: 20 CAPSULE, DELAYED RELEASE ORAL at 17:44

## 2021-01-01 RX ADMIN — METHIMAZOLE 5 MG: 5 TABLET ORAL at 17:43

## 2021-01-01 RX ADMIN — ACETAMINOPHEN 650 MG: 325 TABLET, FILM COATED ORAL at 05:33

## 2021-01-01 RX ADMIN — MICONAZOLE NITRATE: 20 CREAM TOPICAL at 21:53

## 2021-01-01 RX ADMIN — OMEPRAZOLE 20 MG: 20 CAPSULE, DELAYED RELEASE ORAL at 21:17

## 2021-01-01 RX ADMIN — MICONAZOLE NITRATE: 20 CREAM TOPICAL at 21:43

## 2021-01-01 RX ADMIN — MIDODRINE HYDROCHLORIDE 15 MG: 5 TABLET ORAL at 11:35

## 2021-01-01 RX ADMIN — GUAIFENESIN AND DEXTROMETHORPHAN 10 ML: 100; 10 SYRUP ORAL at 17:12

## 2021-01-01 RX ADMIN — INSULIN GLARGINE 8 UNITS: 100 INJECTION, SOLUTION SUBCUTANEOUS at 22:14

## 2021-01-01 RX ADMIN — OXYCODONE 5 MG: 5 TABLET ORAL at 15:32

## 2021-01-01 RX ADMIN — CLOPIDOGREL BISULFATE 75 MG: 75 TABLET ORAL at 06:19

## 2021-01-01 RX ADMIN — FENTANYL CITRATE 25 MCG: 50 INJECTION, SOLUTION INTRAMUSCULAR; INTRAVENOUS at 12:23

## 2021-01-01 RX ADMIN — GABAPENTIN 300 MG: 300 CAPSULE ORAL at 05:20

## 2021-01-01 RX ADMIN — TRAMADOL HYDROCHLORIDE 50 MG: 50 TABLET ORAL at 06:48

## 2021-01-01 RX ADMIN — TRAMADOL HYDROCHLORIDE 50 MG: 50 TABLET, FILM COATED ORAL at 15:07

## 2021-01-01 RX ADMIN — Medication 1334 MG: at 18:26

## 2021-01-01 RX ADMIN — APIXABAN 5 MG: 5 TABLET, FILM COATED ORAL at 05:48

## 2021-01-01 RX ADMIN — FUROSEMIDE 80 MG: 10 INJECTION, SOLUTION INTRAMUSCULAR; INTRAVENOUS at 05:55

## 2021-01-01 RX ADMIN — METOPROLOL TARTRATE 25 MG: 25 TABLET, FILM COATED ORAL at 05:28

## 2021-01-01 RX ADMIN — OMEPRAZOLE 20 MG: 20 CAPSULE, DELAYED RELEASE ORAL at 05:58

## 2021-01-01 RX ADMIN — OMEPRAZOLE 20 MG: 20 CAPSULE, DELAYED RELEASE ORAL at 22:54

## 2021-01-01 RX ADMIN — OXYCODONE HYDROCHLORIDE AND ACETAMINOPHEN 1 TABLET: 5; 325 TABLET ORAL at 15:09

## 2021-01-01 RX ADMIN — LISINOPRIL 2.5 MG: 5 TABLET ORAL at 06:16

## 2021-01-01 RX ADMIN — ATORVASTATIN CALCIUM 40 MG: 40 TABLET, FILM COATED ORAL at 20:37

## 2021-01-01 RX ADMIN — METHIMAZOLE 5 MG: 5 TABLET ORAL at 06:19

## 2021-01-01 RX ADMIN — TRAZODONE HYDROCHLORIDE 50 MG: 50 TABLET ORAL at 22:03

## 2021-01-01 RX ADMIN — MIDODRINE HYDROCHLORIDE 10 MG: 5 TABLET ORAL at 16:59

## 2021-01-01 RX ADMIN — TRAMADOL HYDROCHLORIDE 50 MG: 50 TABLET ORAL at 18:30

## 2021-01-01 RX ADMIN — MIDODRINE HYDROCHLORIDE 10 MG: 5 TABLET ORAL at 13:26

## 2021-01-01 RX ADMIN — Medication 1334 MG: at 05:58

## 2021-01-01 RX ADMIN — SEVELAMER CARBONATE 1600 MG: 800 TABLET, FILM COATED ORAL at 16:44

## 2021-01-01 RX ADMIN — INSULIN GLARGINE 10 UNITS: 100 INJECTION, SOLUTION SUBCUTANEOUS at 18:31

## 2021-01-01 RX ADMIN — INSULIN GLARGINE 8 UNITS: 100 INJECTION, SOLUTION SUBCUTANEOUS at 05:56

## 2021-01-01 RX ADMIN — CARVEDILOL 3.12 MG: 6.25 TABLET, FILM COATED ORAL at 16:34

## 2021-01-01 RX ADMIN — METHIMAZOLE 5 MG: 5 TABLET ORAL at 17:44

## 2021-01-01 RX ADMIN — SEVELAMER CARBONATE 1600 MG: 800 TABLET, FILM COATED ORAL at 17:36

## 2021-01-01 RX ADMIN — TAMSULOSIN HYDROCHLORIDE 0.8 MG: 0.4 CAPSULE ORAL at 01:39

## 2021-01-01 RX ADMIN — CLOPIDOGREL BISULFATE 75 MG: 75 TABLET ORAL at 05:27

## 2021-01-01 RX ADMIN — APIXABAN 2.5 MG: 5 TABLET, FILM COATED ORAL at 20:49

## 2021-01-01 RX ADMIN — SEVELAMER CARBONATE 1600 MG: 800 TABLET, FILM COATED ORAL at 18:03

## 2021-01-01 RX ADMIN — TAMSULOSIN HYDROCHLORIDE 0.8 MG: 0.4 CAPSULE ORAL at 19:23

## 2021-01-01 RX ADMIN — INSULIN HUMAN 1 UNITS: 100 INJECTION, SOLUTION PARENTERAL at 21:00

## 2021-01-01 RX ADMIN — INSULIN GLARGINE 10 UNITS: 100 INJECTION, SOLUTION SUBCUTANEOUS at 20:56

## 2021-01-01 RX ADMIN — APIXABAN 5 MG: 5 TABLET, FILM COATED ORAL at 06:54

## 2021-01-01 RX ADMIN — ACETAMINOPHEN 650 MG: 325 TABLET, FILM COATED ORAL at 16:21

## 2021-01-01 RX ADMIN — CARVEDILOL 3.12 MG: 6.25 TABLET, FILM COATED ORAL at 08:56

## 2021-01-01 RX ADMIN — TRAZODONE HYDROCHLORIDE 50 MG: 50 TABLET ORAL at 21:56

## 2021-01-01 RX ADMIN — SEVELAMER CARBONATE 1600 MG: 800 TABLET, FILM COATED ORAL at 07:35

## 2021-01-01 RX ADMIN — MIDODRINE HYDROCHLORIDE 10 MG: 5 TABLET ORAL at 17:11

## 2021-01-01 RX ADMIN — DOCUSATE SODIUM 50 MG AND SENNOSIDES 8.6 MG 2 TABLET: 8.6; 5 TABLET, FILM COATED ORAL at 08:21

## 2021-01-01 RX ADMIN — MIDODRINE HYDROCHLORIDE 5 MG: 5 TABLET ORAL at 08:13

## 2021-01-01 RX ADMIN — TAMSULOSIN HYDROCHLORIDE 0.8 MG: 0.4 CAPSULE ORAL at 22:01

## 2021-01-01 RX ADMIN — EPOETIN ALFA-EPBX 10000 UNITS: 10000 INJECTION, SOLUTION INTRAVENOUS; SUBCUTANEOUS at 16:38

## 2021-01-01 RX ADMIN — OMEPRAZOLE 20 MG: 20 CAPSULE, DELAYED RELEASE ORAL at 05:05

## 2021-01-01 RX ADMIN — EPOETIN ALFA-EPBX 10000 UNITS: 10000 INJECTION, SOLUTION INTRAVENOUS; SUBCUTANEOUS at 15:35

## 2021-01-01 RX ADMIN — OMEPRAZOLE 20 MG: 20 CAPSULE, DELAYED RELEASE ORAL at 05:20

## 2021-01-01 RX ADMIN — GABAPENTIN 300 MG: 300 CAPSULE ORAL at 16:35

## 2021-01-01 RX ADMIN — METHIMAZOLE 5 MG: 5 TABLET ORAL at 05:45

## 2021-01-01 RX ADMIN — ACETAMINOPHEN 1000 MG: 500 TABLET ORAL at 14:26

## 2021-01-01 RX ADMIN — METHIMAZOLE 5 MG: 5 TABLET ORAL at 06:13

## 2021-01-01 RX ADMIN — SEVELAMER CARBONATE 1600 MG: 800 TABLET, FILM COATED ORAL at 07:16

## 2021-01-01 RX ADMIN — ATORVASTATIN CALCIUM 40 MG: 40 TABLET, FILM COATED ORAL at 21:33

## 2021-01-01 RX ADMIN — GABAPENTIN 100 MG: 100 CAPSULE ORAL at 17:36

## 2021-01-01 RX ADMIN — ACETAMINOPHEN 650 MG: 325 TABLET, FILM COATED ORAL at 17:07

## 2021-01-01 RX ADMIN — ACETAMINOPHEN 1000 MG: 500 TABLET ORAL at 20:47

## 2021-01-01 RX ADMIN — Medication 1334 MG: at 16:34

## 2021-01-01 RX ADMIN — ACETAMINOPHEN 1000 MG: 500 TABLET ORAL at 14:37

## 2021-01-01 RX ADMIN — MIDODRINE HYDROCHLORIDE 15 MG: 5 TABLET ORAL at 14:28

## 2021-01-01 RX ADMIN — OXYCODONE 5 MG: 5 TABLET ORAL at 13:26

## 2021-01-01 RX ADMIN — GABAPENTIN 300 MG: 300 CAPSULE ORAL at 18:12

## 2021-01-01 RX ADMIN — SEVELAMER CARBONATE 2400 MG: 800 TABLET, FILM COATED ORAL at 13:33

## 2021-01-01 RX ADMIN — INSULIN GLARGINE 8 UNITS: 100 INJECTION, SOLUTION SUBCUTANEOUS at 07:53

## 2021-01-01 RX ADMIN — MIDODRINE HYDROCHLORIDE 15 MG: 5 TABLET ORAL at 20:23

## 2021-01-01 RX ADMIN — METHIMAZOLE 5 MG: 5 TABLET ORAL at 20:22

## 2021-01-01 RX ADMIN — INSULIN LISPRO 1 UNITS: 100 INJECTION, SOLUTION INTRAVENOUS; SUBCUTANEOUS at 20:37

## 2021-01-01 RX ADMIN — MIDODRINE HYDROCHLORIDE 10 MG: 5 TABLET ORAL at 14:30

## 2021-01-01 RX ADMIN — ACETAMINOPHEN 500 MG: 500 TABLET ORAL at 17:35

## 2021-01-01 RX ADMIN — METHIMAZOLE 5 MG: 5 TABLET ORAL at 06:00

## 2021-01-01 RX ADMIN — OMEPRAZOLE 20 MG: 20 CAPSULE, DELAYED RELEASE ORAL at 05:35

## 2021-01-01 RX ADMIN — CLOPIDOGREL BISULFATE 75 MG: 75 TABLET ORAL at 05:47

## 2021-01-01 RX ADMIN — APIXABAN 5 MG: 5 TABLET, FILM COATED ORAL at 06:19

## 2021-01-01 RX ADMIN — OXYCODONE HYDROCHLORIDE 10 MG: 5 SOLUTION ORAL at 12:09

## 2021-01-01 RX ADMIN — SEVELAMER CARBONATE 1600 MG: 800 TABLET, FILM COATED ORAL at 17:18

## 2021-01-01 RX ADMIN — CALCITRIOL CAPSULES 0.25 MCG 0.25 MCG: 0.25 CAPSULE ORAL at 06:21

## 2021-01-01 RX ADMIN — METHIMAZOLE 5 MG: 5 TABLET ORAL at 20:32

## 2021-01-01 RX ADMIN — HEPARIN SODIUM 1800 UNITS: 1000 INJECTION, SOLUTION INTRAVENOUS; SUBCUTANEOUS at 15:08

## 2021-01-01 RX ADMIN — HEPARIN SODIUM 5000 UNITS: 5000 INJECTION, SOLUTION INTRAVENOUS; SUBCUTANEOUS at 12:46

## 2021-01-01 RX ADMIN — HEPARIN SODIUM 5000 UNITS: 5000 INJECTION, SOLUTION INTRAVENOUS; SUBCUTANEOUS at 22:19

## 2021-01-01 RX ADMIN — OXYCODONE HYDROCHLORIDE 5 MG: 5 TABLET ORAL at 20:48

## 2021-01-01 RX ADMIN — PIPERACILLIN AND TAZOBACTAM 3.38 G: 3; .375 INJECTION, POWDER, LYOPHILIZED, FOR SOLUTION INTRAVENOUS; PARENTERAL at 08:49

## 2021-01-01 RX ADMIN — OMEPRAZOLE 20 MG: 20 CAPSULE, DELAYED RELEASE ORAL at 05:59

## 2021-01-01 RX ADMIN — GABAPENTIN 300 MG: 300 CAPSULE ORAL at 08:59

## 2021-01-01 RX ADMIN — MIDODRINE HYDROCHLORIDE 15 MG: 5 TABLET ORAL at 17:14

## 2021-01-01 RX ADMIN — ATORVASTATIN CALCIUM 40 MG: 40 TABLET, FILM COATED ORAL at 18:02

## 2021-01-01 RX ADMIN — TAMSULOSIN HYDROCHLORIDE 0.8 MG: 0.4 CAPSULE ORAL at 20:05

## 2021-01-01 RX ADMIN — CLOPIDOGREL BISULFATE 75 MG: 75 TABLET ORAL at 06:01

## 2021-01-01 RX ADMIN — TAMSULOSIN HYDROCHLORIDE 0.8 MG: 0.4 CAPSULE ORAL at 21:22

## 2021-01-01 RX ADMIN — SEVELAMER CARBONATE 2400 MG: 800 TABLET, FILM COATED ORAL at 17:35

## 2021-01-01 RX ADMIN — SEVELAMER CARBONATE 1600 MG: 800 TABLET, FILM COATED ORAL at 12:36

## 2021-01-01 RX ADMIN — ATORVASTATIN CALCIUM 40 MG: 40 TABLET, FILM COATED ORAL at 22:18

## 2021-01-01 RX ADMIN — ACETAMINOPHEN 1000 MG: 500 TABLET ORAL at 21:18

## 2021-01-01 RX ADMIN — CALCITRIOL CAPSULES 0.25 MCG 0.25 MCG: 0.25 CAPSULE ORAL at 06:34

## 2021-01-01 RX ADMIN — GUAIFENESIN 600 MG: 600 TABLET, EXTENDED RELEASE ORAL at 05:22

## 2021-01-01 RX ADMIN — TAMSULOSIN HYDROCHLORIDE 0.8 MG: 0.4 CAPSULE ORAL at 21:38

## 2021-01-01 RX ADMIN — APIXABAN 5 MG: 5 TABLET, FILM COATED ORAL at 18:12

## 2021-01-01 RX ADMIN — INSULIN GLARGINE 10 UNITS: 100 INJECTION, SOLUTION SUBCUTANEOUS at 17:49

## 2021-01-01 RX ADMIN — FLUCONAZOLE 100 MG: 100 TABLET ORAL at 07:55

## 2021-01-01 RX ADMIN — HEPARIN SODIUM 5000 UNITS: 5000 INJECTION, SOLUTION INTRAVENOUS; SUBCUTANEOUS at 05:29

## 2021-01-01 RX ADMIN — METHIMAZOLE 5 MG: 5 TABLET ORAL at 05:06

## 2021-01-01 RX ADMIN — OMEPRAZOLE 20 MG: 20 CAPSULE, DELAYED RELEASE ORAL at 05:22

## 2021-01-01 RX ADMIN — HEPARIN SODIUM 1500 UNITS: 1000 INJECTION, SOLUTION INTRAVENOUS; SUBCUTANEOUS at 12:41

## 2021-01-01 RX ADMIN — ACETAMINOPHEN 650 MG: 325 TABLET, FILM COATED ORAL at 20:07

## 2021-01-01 RX ADMIN — METHIMAZOLE 5 MG: 5 TABLET ORAL at 05:55

## 2021-01-01 RX ADMIN — MIDODRINE HYDROCHLORIDE 10 MG: 5 TABLET ORAL at 18:03

## 2021-01-01 RX ADMIN — TAMSULOSIN HYDROCHLORIDE 0.8 MG: 0.4 CAPSULE ORAL at 20:48

## 2021-01-01 RX ADMIN — HEPARIN SODIUM 5000 UNITS: 5000 INJECTION, SOLUTION INTRAVENOUS; SUBCUTANEOUS at 21:24

## 2021-01-01 RX ADMIN — GUAIFENESIN 600 MG: 600 TABLET, EXTENDED RELEASE ORAL at 05:45

## 2021-01-01 RX ADMIN — GABAPENTIN 300 MG: 300 CAPSULE ORAL at 21:18

## 2021-01-01 RX ADMIN — FENTANYL CITRATE 25 MCG: 50 INJECTION, SOLUTION INTRAMUSCULAR; INTRAVENOUS at 16:24

## 2021-01-01 RX ADMIN — FUROSEMIDE 80 MG: 10 INJECTION, SOLUTION INTRAMUSCULAR; INTRAVENOUS at 06:11

## 2021-01-01 RX ADMIN — MIDODRINE HYDROCHLORIDE 10 MG: 5 TABLET ORAL at 12:24

## 2021-01-01 RX ADMIN — TRAMADOL HYDROCHLORIDE 50 MG: 50 TABLET ORAL at 08:58

## 2021-01-01 RX ADMIN — BUDESONIDE 0.5 MG: 0.5 SUSPENSION RESPIRATORY (INHALATION) at 07:56

## 2021-01-01 RX ADMIN — OXYCODONE 5 MG: 5 TABLET ORAL at 03:31

## 2021-01-01 RX ADMIN — MIDODRINE HYDROCHLORIDE 10 MG: 5 TABLET ORAL at 08:50

## 2021-01-01 RX ADMIN — METHIMAZOLE 7.5 MG: 5 TABLET ORAL at 08:28

## 2021-01-01 RX ADMIN — METOPROLOL TARTRATE 37.5 MG: 25 TABLET, FILM COATED ORAL at 17:30

## 2021-01-01 RX ADMIN — ATORVASTATIN CALCIUM 40 MG: 40 TABLET, FILM COATED ORAL at 18:26

## 2021-01-01 RX ADMIN — GUAIFENESIN 600 MG: 600 TABLET, EXTENDED RELEASE ORAL at 18:48

## 2021-01-01 RX ADMIN — FENTANYL CITRATE 50 MCG: 50 INJECTION, SOLUTION INTRAMUSCULAR; INTRAVENOUS at 11:04

## 2021-01-01 RX ADMIN — METHIMAZOLE 7.5 MG: 5 TABLET ORAL at 04:42

## 2021-01-01 RX ADMIN — INSULIN LISPRO 1 UNITS: 100 INJECTION, SOLUTION INTRAVENOUS; SUBCUTANEOUS at 21:32

## 2021-01-01 RX ADMIN — SEVELAMER CARBONATE 1600 MG: 800 TABLET, FILM COATED ORAL at 21:45

## 2021-01-01 RX ADMIN — ATORVASTATIN CALCIUM 40 MG: 40 TABLET, FILM COATED ORAL at 18:48

## 2021-01-01 RX ADMIN — SEVELAMER CARBONATE 1600 MG: 800 TABLET, FILM COATED ORAL at 17:32

## 2021-01-01 RX ADMIN — GUAIFENESIN 600 MG: 600 TABLET, EXTENDED RELEASE ORAL at 01:19

## 2021-01-01 RX ADMIN — Medication 16 G: at 06:04

## 2021-01-01 RX ADMIN — APIXABAN 2.5 MG: 5 TABLET, FILM COATED ORAL at 08:59

## 2021-01-01 RX ADMIN — Medication 1334 MG: at 05:46

## 2021-01-01 RX ADMIN — ATORVASTATIN CALCIUM 40 MG: 40 TABLET, FILM COATED ORAL at 21:47

## 2021-01-01 RX ADMIN — HEPARIN SODIUM 2000 UNITS: 1000 INJECTION, SOLUTION INTRAVENOUS; SUBCUTANEOUS at 12:24

## 2021-01-01 RX ADMIN — SEVELAMER CARBONATE 1600 MG: 800 TABLET, FILM COATED ORAL at 08:28

## 2021-01-01 RX ADMIN — INSULIN GLARGINE 10 UNITS: 100 INJECTION, SOLUTION SUBCUTANEOUS at 21:09

## 2021-01-01 RX ADMIN — Medication 1334 MG: at 07:30

## 2021-01-01 RX ADMIN — METHIMAZOLE 5 MG: 5 TABLET ORAL at 17:33

## 2021-01-01 RX ADMIN — HEPARIN SODIUM 1500 UNITS: 1000 INJECTION, SOLUTION INTRAVENOUS; SUBCUTANEOUS at 15:37

## 2021-01-01 RX ADMIN — OXYCODONE 5 MG: 5 TABLET ORAL at 17:26

## 2021-01-01 RX ADMIN — HEPARIN SODIUM 1800 UNITS: 1000 INJECTION, SOLUTION INTRAVENOUS; SUBCUTANEOUS at 14:10

## 2021-01-01 RX ADMIN — METHIMAZOLE 5 MG: 5 TABLET ORAL at 18:33

## 2021-01-01 RX ADMIN — ACETAMINOPHEN 1000 MG: 500 TABLET ORAL at 08:51

## 2021-01-01 RX ADMIN — HEPARIN SODIUM 5000 UNITS: 5000 INJECTION, SOLUTION INTRAVENOUS; SUBCUTANEOUS at 14:58

## 2021-01-01 RX ADMIN — GUAIFENESIN 600 MG: 600 TABLET, EXTENDED RELEASE ORAL at 12:58

## 2021-01-01 RX ADMIN — NOREPINEPHRINE BITARTRATE 5 MCG/MIN: 1 INJECTION, SOLUTION, CONCENTRATE INTRAVENOUS at 05:27

## 2021-01-01 RX ADMIN — MELATONIN TAB 3 MG 3 MG: 3 TAB at 20:00

## 2021-01-01 RX ADMIN — MIDODRINE HYDROCHLORIDE 10 MG: 5 TABLET ORAL at 09:19

## 2021-01-01 RX ADMIN — APIXABAN 2.5 MG: 5 TABLET, FILM COATED ORAL at 20:05

## 2021-01-01 RX ADMIN — METHIMAZOLE 5 MG: 5 TABLET ORAL at 05:14

## 2021-01-01 RX ADMIN — METHIMAZOLE 5 MG: 5 TABLET ORAL at 20:15

## 2021-01-01 RX ADMIN — APIXABAN 5 MG: 5 TABLET, FILM COATED ORAL at 06:16

## 2021-01-01 RX ADMIN — MIDODRINE HYDROCHLORIDE 10 MG: 5 TABLET ORAL at 09:09

## 2021-01-01 RX ADMIN — CALCITRIOL CAPSULES 0.25 MCG 0.25 MCG: 0.25 CAPSULE ORAL at 08:16

## 2021-01-01 RX ADMIN — CLOPIDOGREL BISULFATE 75 MG: 75 TABLET ORAL at 05:37

## 2021-01-01 RX ADMIN — CLOPIDOGREL BISULFATE 75 MG: 75 TABLET ORAL at 06:46

## 2021-01-01 RX ADMIN — SEVELAMER CARBONATE 1600 MG: 800 TABLET, FILM COATED ORAL at 15:30

## 2021-01-01 RX ADMIN — OXYCODONE 5 MG: 5 TABLET ORAL at 23:47

## 2021-01-01 RX ADMIN — PIPERACILLIN AND TAZOBACTAM 3.38 G: 3; .375 INJECTION, POWDER, LYOPHILIZED, FOR SOLUTION INTRAVENOUS; PARENTERAL at 16:50

## 2021-01-01 RX ADMIN — OMEPRAZOLE 20 MG: 20 CAPSULE, DELAYED RELEASE ORAL at 08:27

## 2021-01-01 RX ADMIN — MIDODRINE HYDROCHLORIDE 15 MG: 5 TABLET ORAL at 18:38

## 2021-01-01 RX ADMIN — INSULIN HUMAN 1 UNITS: 100 INJECTION, SOLUTION PARENTERAL at 20:57

## 2021-01-01 RX ADMIN — Medication 1334 MG: at 08:25

## 2021-01-01 RX ADMIN — METHIMAZOLE 5 MG: 5 TABLET ORAL at 21:12

## 2021-01-01 RX ADMIN — GUAIFENESIN 600 MG: 600 TABLET, EXTENDED RELEASE ORAL at 18:31

## 2021-01-01 RX ADMIN — OXYCODONE HYDROCHLORIDE 5 MG: 5 TABLET ORAL at 20:18

## 2021-01-01 RX ADMIN — TAMSULOSIN HYDROCHLORIDE 0.8 MG: 0.4 CAPSULE ORAL at 20:19

## 2021-01-01 RX ADMIN — OXYCODONE 5 MG: 5 TABLET ORAL at 00:09

## 2021-01-01 RX ADMIN — CALCITRIOL CAPSULES 0.25 MCG 0.25 MCG: 0.25 CAPSULE ORAL at 05:55

## 2021-01-01 RX ADMIN — SEVELAMER CARBONATE 2400 MG: 800 TABLET, FILM COATED ORAL at 12:44

## 2021-01-01 RX ADMIN — SEVELAMER CARBONATE 1600 MG: 800 TABLET, FILM COATED ORAL at 18:53

## 2021-01-01 RX ADMIN — APIXABAN 2.5 MG: 5 TABLET, FILM COATED ORAL at 20:42

## 2021-01-01 RX ADMIN — CLOPIDOGREL BISULFATE 75 MG: 75 TABLET ORAL at 05:56

## 2021-01-01 RX ADMIN — TAMSULOSIN HYDROCHLORIDE 0.8 MG: 0.4 CAPSULE ORAL at 20:06

## 2021-01-01 RX ADMIN — MIDODRINE HYDROCHLORIDE 10 MG: 5 TABLET ORAL at 09:00

## 2021-01-01 RX ADMIN — FUROSEMIDE 40 MG: 10 INJECTION, SOLUTION INTRAMUSCULAR; INTRAVENOUS at 15:25

## 2021-01-01 RX ADMIN — HEPARIN SODIUM 3700 UNITS: 1000 INJECTION, SOLUTION INTRAVENOUS; SUBCUTANEOUS at 18:40

## 2021-01-01 RX ADMIN — ATORVASTATIN CALCIUM 40 MG: 40 TABLET, FILM COATED ORAL at 21:09

## 2021-01-01 RX ADMIN — CALCITRIOL CAPSULES 0.25 MCG 0.25 MCG: 0.25 CAPSULE ORAL at 08:33

## 2021-01-01 RX ADMIN — MIDODRINE HYDROCHLORIDE 15 MG: 5 TABLET ORAL at 06:34

## 2021-01-01 RX ADMIN — DOCUSATE SODIUM 50 MG AND SENNOSIDES 8.6 MG 2 TABLET: 8.6; 5 TABLET, FILM COATED ORAL at 05:03

## 2021-01-01 RX ADMIN — DOCUSATE SODIUM 50 MG AND SENNOSIDES 8.6 MG 2 TABLET: 8.6; 5 TABLET, FILM COATED ORAL at 08:16

## 2021-01-01 RX ADMIN — GABAPENTIN 300 MG: 300 CAPSULE ORAL at 18:53

## 2021-01-01 RX ADMIN — PIPERACILLIN SODIUM AND TAZOBACTAM SODIUM 3.38 G: 3; .375 INJECTION, POWDER, LYOPHILIZED, FOR SOLUTION INTRAVENOUS at 19:46

## 2021-01-01 RX ADMIN — SODIUM CHLORIDE: 9 INJECTION, SOLUTION INTRAVENOUS at 17:16

## 2021-01-01 RX ADMIN — POLYETHYLENE GLYCOL 3350 1 PACKET: 17 POWDER, FOR SOLUTION ORAL at 17:55

## 2021-01-01 RX ADMIN — OMEPRAZOLE 20 MG: 20 CAPSULE, DELAYED RELEASE ORAL at 05:56

## 2021-01-01 RX ADMIN — METOPROLOL TARTRATE 37.5 MG: 25 TABLET, FILM COATED ORAL at 17:15

## 2021-01-01 RX ADMIN — HEPARIN SODIUM 3700 UNITS: 1000 INJECTION, SOLUTION INTRAVENOUS; SUBCUTANEOUS at 14:38

## 2021-01-01 RX ADMIN — MICONAZOLE NITRATE: 20 CREAM TOPICAL at 21:26

## 2021-01-01 RX ADMIN — CLOPIDOGREL BISULFATE 75 MG: 75 TABLET ORAL at 06:00

## 2021-01-01 RX ADMIN — AMPICILLIN SODIUM AND SULBACTAM SODIUM 3 G: 2; 1 INJECTION, POWDER, FOR SOLUTION INTRAMUSCULAR; INTRAVENOUS at 08:26

## 2021-01-01 RX ADMIN — MIDODRINE HYDROCHLORIDE 10 MG: 5 TABLET ORAL at 12:39

## 2021-01-01 RX ADMIN — SODIUM CHLORIDE 125 MG: 9 INJECTION, SOLUTION INTRAVENOUS at 17:28

## 2021-01-01 RX ADMIN — SEVELAMER CARBONATE 1600 MG: 800 TABLET, FILM COATED ORAL at 14:12

## 2021-01-01 RX ADMIN — MIDAZOLAM HYDROCHLORIDE 1 MG: 1 INJECTION, SOLUTION INTRAMUSCULAR; INTRAVENOUS at 11:00

## 2021-01-01 RX ADMIN — APIXABAN 5 MG: 5 TABLET, FILM COATED ORAL at 06:24

## 2021-01-01 RX ADMIN — SEVELAMER CARBONATE 2400 MG: 800 TABLET, FILM COATED ORAL at 11:10

## 2021-01-01 RX ADMIN — ASPIRIN 81 MG: 81 TABLET, COATED ORAL at 17:13

## 2021-01-01 RX ADMIN — GUAIFENESIN 600 MG: 600 TABLET, EXTENDED RELEASE ORAL at 18:27

## 2021-01-01 RX ADMIN — OMEPRAZOLE 20 MG: 20 CAPSULE, DELAYED RELEASE ORAL at 21:33

## 2021-01-01 RX ADMIN — PIPERACILLIN AND TAZOBACTAM 4.5 G: 4; .5 INJECTION, POWDER, LYOPHILIZED, FOR SOLUTION INTRAVENOUS; PARENTERAL at 17:37

## 2021-01-01 RX ADMIN — ALTEPLASE 2 MG: 2.2 INJECTION, POWDER, LYOPHILIZED, FOR SOLUTION INTRAVENOUS at 08:45

## 2021-01-01 RX ADMIN — Medication 1334 MG: at 18:01

## 2021-01-01 RX ADMIN — INSULIN GLARGINE 8 UNITS: 100 INJECTION, SOLUTION SUBCUTANEOUS at 07:19

## 2021-01-01 RX ADMIN — FENTANYL CITRATE 50 MCG: 50 INJECTION, SOLUTION INTRAMUSCULAR; INTRAVENOUS at 11:00

## 2021-01-01 RX ADMIN — TRAZODONE HYDROCHLORIDE 50 MG: 50 TABLET ORAL at 20:52

## 2021-01-01 RX ADMIN — INSULIN LISPRO 3 UNITS: 100 INJECTION, SOLUTION INTRAVENOUS; SUBCUTANEOUS at 07:30

## 2021-01-01 RX ADMIN — METOPROLOL TARTRATE 37.5 MG: 25 TABLET, FILM COATED ORAL at 05:45

## 2021-01-01 RX ADMIN — METHIMAZOLE 5 MG: 5 TABLET ORAL at 18:34

## 2021-01-01 RX ADMIN — DOCUSATE SODIUM 50 MG AND SENNOSIDES 8.6 MG 2 TABLET: 8.6; 5 TABLET, FILM COATED ORAL at 05:48

## 2021-01-01 RX ADMIN — OMEPRAZOLE 20 MG: 20 CAPSULE, DELAYED RELEASE ORAL at 20:17

## 2021-01-01 RX ADMIN — APIXABAN 5 MG: 5 TABLET, FILM COATED ORAL at 17:19

## 2021-01-01 RX ADMIN — METHIMAZOLE 5 MG: 5 TABLET ORAL at 20:35

## 2021-01-01 RX ADMIN — ACETAMINOPHEN 1000 MG: 500 TABLET ORAL at 15:34

## 2021-01-01 RX ADMIN — OMEPRAZOLE 20 MG: 20 CAPSULE, DELAYED RELEASE ORAL at 06:25

## 2021-01-01 RX ADMIN — SEVELAMER CARBONATE 1600 MG: 800 TABLET, FILM COATED ORAL at 13:35

## 2021-01-01 RX ADMIN — OMEPRAZOLE 20 MG: 20 CAPSULE, DELAYED RELEASE ORAL at 04:54

## 2021-01-01 RX ADMIN — BUDESONIDE 0.5 MG: 0.5 SUSPENSION RESPIRATORY (INHALATION) at 09:00

## 2021-01-01 RX ADMIN — MIDODRINE HYDROCHLORIDE 10 MG: 5 TABLET ORAL at 13:20

## 2021-01-01 RX ADMIN — GUAIFENESIN 600 MG: 600 TABLET, EXTENDED RELEASE ORAL at 17:29

## 2021-01-01 RX ADMIN — EPOETIN ALFA-EPBX 4000 UNITS: 4000 INJECTION, SOLUTION INTRAVENOUS; SUBCUTANEOUS at 11:20

## 2021-01-01 RX ADMIN — OMEPRAZOLE 20 MG: 20 CAPSULE, DELAYED RELEASE ORAL at 17:36

## 2021-01-01 RX ADMIN — CARVEDILOL 3.12 MG: 3.12 TABLET, FILM COATED ORAL at 22:54

## 2021-01-01 RX ADMIN — MIDODRINE HYDROCHLORIDE 15 MG: 5 TABLET ORAL at 14:15

## 2021-01-01 RX ADMIN — APIXABAN 5 MG: 5 TABLET, FILM COATED ORAL at 05:28

## 2021-01-01 RX ADMIN — DOCUSATE SODIUM 50 MG AND SENNOSIDES 8.6 MG 2 TABLET: 8.6; 5 TABLET, FILM COATED ORAL at 17:36

## 2021-01-01 RX ADMIN — HEPARIN SODIUM 5000 UNITS: 5000 INJECTION, SOLUTION INTRAVENOUS; SUBCUTANEOUS at 21:11

## 2021-01-01 RX ADMIN — SODIUM CHLORIDE 500 ML: 9 INJECTION, SOLUTION INTRAVENOUS at 02:33

## 2021-01-01 RX ADMIN — PROPOFOL 100 MG: 10 INJECTION, EMULSION INTRAVENOUS at 10:47

## 2021-01-01 RX ADMIN — METHIMAZOLE 5 MG: 5 TABLET ORAL at 18:08

## 2021-01-01 RX ADMIN — MIDODRINE HYDROCHLORIDE 10 MG: 5 TABLET ORAL at 08:28

## 2021-01-01 RX ADMIN — GABAPENTIN 300 MG: 300 CAPSULE ORAL at 17:49

## 2021-01-01 RX ADMIN — GUAIFENESIN 600 MG: 600 TABLET, EXTENDED RELEASE ORAL at 23:47

## 2021-01-01 RX ADMIN — INSULIN GLARGINE 8 UNITS: 100 INJECTION, SOLUTION SUBCUTANEOUS at 07:42

## 2021-01-01 RX ADMIN — METHIMAZOLE 7.5 MG: 5 TABLET ORAL at 06:00

## 2021-01-01 RX ADMIN — METHIMAZOLE 5 MG: 5 TABLET ORAL at 01:39

## 2021-01-01 RX ADMIN — HEPARIN SODIUM 5000 UNITS: 5000 INJECTION, SOLUTION INTRAVENOUS; SUBCUTANEOUS at 13:42

## 2021-01-01 RX ADMIN — DOCUSATE SODIUM 50 MG AND SENNOSIDES 8.6 MG 2 TABLET: 8.6; 5 TABLET, FILM COATED ORAL at 08:33

## 2021-01-01 RX ADMIN — MIDODRINE HYDROCHLORIDE 10 MG: 5 TABLET ORAL at 09:34

## 2021-01-01 RX ADMIN — PIPERACILLIN SODIUM AND TAZOBACTAM SODIUM 3.38 G: 3; .375 INJECTION, POWDER, LYOPHILIZED, FOR SOLUTION INTRAVENOUS at 07:31

## 2021-01-01 RX ADMIN — GABAPENTIN 300 MG: 300 CAPSULE ORAL at 06:10

## 2021-01-01 RX ADMIN — SEVELAMER CARBONATE 1600 MG: 800 TABLET, FILM COATED ORAL at 09:06

## 2021-01-01 RX ADMIN — GABAPENTIN 300 MG: 300 CAPSULE ORAL at 23:41

## 2021-01-01 RX ADMIN — CARVEDILOL 3.12 MG: 6.25 TABLET, FILM COATED ORAL at 08:08

## 2021-01-01 RX ADMIN — SEVELAMER CARBONATE 2400 MG: 800 TABLET, FILM COATED ORAL at 11:59

## 2021-01-01 RX ADMIN — MIDODRINE HYDROCHLORIDE 10 MG: 5 TABLET ORAL at 13:06

## 2021-01-01 RX ADMIN — OMEPRAZOLE 20 MG: 20 CAPSULE, DELAYED RELEASE ORAL at 04:17

## 2021-01-01 RX ADMIN — SEVELAMER CARBONATE 800 MG: 800 TABLET, FILM COATED ORAL at 17:31

## 2021-01-01 RX ADMIN — TAMSULOSIN HYDROCHLORIDE 0.8 MG: 0.4 CAPSULE ORAL at 20:50

## 2021-01-01 RX ADMIN — APIXABAN 2.5 MG: 5 TABLET, FILM COATED ORAL at 08:39

## 2021-01-01 RX ADMIN — CLOPIDOGREL BISULFATE 75 MG: 75 TABLET ORAL at 09:21

## 2021-01-01 RX ADMIN — ATORVASTATIN CALCIUM 40 MG: 40 TABLET, FILM COATED ORAL at 21:52

## 2021-01-01 RX ADMIN — METHIMAZOLE 5 MG: 5 TABLET ORAL at 19:37

## 2021-01-01 RX ADMIN — EPOETIN ALFA-EPBX 10000 UNITS: 10000 INJECTION, SOLUTION INTRAVENOUS; SUBCUTANEOUS at 15:00

## 2021-01-01 RX ADMIN — SODIUM CHLORIDE 3 G: 900 INJECTION INTRAVENOUS at 08:03

## 2021-01-01 RX ADMIN — TRAMADOL HYDROCHLORIDE 50 MG: 50 TABLET ORAL at 10:18

## 2021-01-01 RX ADMIN — APIXABAN 5 MG: 5 TABLET, FILM COATED ORAL at 05:20

## 2021-01-01 RX ADMIN — BISACODYL 10 MG: 10 SUPPOSITORY RECTAL at 04:12

## 2021-01-01 RX ADMIN — OMEPRAZOLE 20 MG: 20 CAPSULE, DELAYED RELEASE ORAL at 19:37

## 2021-01-01 RX ADMIN — HEPARIN SODIUM 5000 UNITS: 5000 INJECTION, SOLUTION INTRAVENOUS; SUBCUTANEOUS at 15:50

## 2021-01-01 RX ADMIN — GABAPENTIN 300 MG: 300 CAPSULE ORAL at 11:27

## 2021-01-01 RX ADMIN — ATORVASTATIN CALCIUM 40 MG: 40 TABLET, FILM COATED ORAL at 22:54

## 2021-01-01 RX ADMIN — MIDODRINE HYDROCHLORIDE 10 MG: 5 TABLET ORAL at 15:09

## 2021-01-01 RX ADMIN — DOCUSATE SODIUM 50 MG AND SENNOSIDES 8.6 MG 2 TABLET: 8.6; 5 TABLET, FILM COATED ORAL at 18:39

## 2021-01-01 RX ADMIN — METHIMAZOLE 5 MG: 5 TABLET ORAL at 04:18

## 2021-01-01 RX ADMIN — ONDANSETRON 4 MG: 2 INJECTION INTRAMUSCULAR; INTRAVENOUS at 08:05

## 2021-01-01 RX ADMIN — METHIMAZOLE 7.5 MG: 5 TABLET ORAL at 08:06

## 2021-01-01 RX ADMIN — APIXABAN 2.5 MG: 5 TABLET, FILM COATED ORAL at 08:21

## 2021-01-01 RX ADMIN — DOCUSATE SODIUM 50 MG AND SENNOSIDES 8.6 MG 2 TABLET: 8.6; 5 TABLET, FILM COATED ORAL at 18:00

## 2021-01-01 RX ADMIN — HEPARIN SODIUM 5000 UNITS: 5000 INJECTION, SOLUTION INTRAVENOUS; SUBCUTANEOUS at 21:40

## 2021-01-01 RX ADMIN — INSULIN GLARGINE 8 UNITS: 100 INJECTION, SOLUTION SUBCUTANEOUS at 05:36

## 2021-01-01 RX ADMIN — CARVEDILOL 3.12 MG: 3.12 TABLET, FILM COATED ORAL at 16:45

## 2021-01-01 RX ADMIN — CALCITRIOL CAPSULES 0.25 MCG 0.25 MCG: 0.25 CAPSULE ORAL at 08:20

## 2021-01-01 RX ADMIN — ACETAMINOPHEN 1000 MG: 500 TABLET ORAL at 04:42

## 2021-01-01 RX ADMIN — SEVELAMER CARBONATE 1600 MG: 800 TABLET, FILM COATED ORAL at 08:12

## 2021-01-01 RX ADMIN — TRAMADOL HYDROCHLORIDE 50 MG: 50 TABLET ORAL at 00:06

## 2021-01-01 RX ADMIN — MIDODRINE HYDROCHLORIDE 15 MG: 5 TABLET ORAL at 08:45

## 2021-01-01 RX ADMIN — CALCITRIOL CAPSULES 0.25 MCG 0.25 MCG: 0.25 CAPSULE ORAL at 08:28

## 2021-01-01 RX ADMIN — SEVELAMER CARBONATE 1600 MG: 800 TABLET, FILM COATED ORAL at 18:07

## 2021-01-01 RX ADMIN — Medication 667 MG: at 08:06

## 2021-01-01 RX ADMIN — INSULIN GLARGINE 8 UNITS: 100 INJECTION, SOLUTION SUBCUTANEOUS at 07:20

## 2021-01-01 RX ADMIN — HEPARIN SODIUM 5000 UNITS: 5000 INJECTION, SOLUTION INTRAVENOUS; SUBCUTANEOUS at 05:07

## 2021-01-01 RX ADMIN — INSULIN LISPRO 3 UNITS: 100 INJECTION, SOLUTION INTRAVENOUS; SUBCUTANEOUS at 18:28

## 2021-01-01 RX ADMIN — ACETAMINOPHEN 1000 MG: 500 TABLET ORAL at 21:24

## 2021-01-01 RX ADMIN — CLOPIDOGREL BISULFATE 75 MG: 75 TABLET ORAL at 05:40

## 2021-01-01 RX ADMIN — METOPROLOL TARTRATE 25 MG: 25 TABLET, FILM COATED ORAL at 17:11

## 2021-01-01 RX ADMIN — HEPARIN SODIUM 1500 UNITS: 1000 INJECTION, SOLUTION INTRAVENOUS; SUBCUTANEOUS at 13:42

## 2021-01-01 RX ADMIN — CARVEDILOL 3.12 MG: 3.12 TABLET, FILM COATED ORAL at 08:46

## 2021-01-01 RX ADMIN — TAMSULOSIN HYDROCHLORIDE 0.8 MG: 0.4 CAPSULE ORAL at 22:06

## 2021-01-01 RX ADMIN — CLOPIDOGREL BISULFATE 75 MG: 75 TABLET ORAL at 13:01

## 2021-01-01 RX ADMIN — Medication 1334 MG: at 17:11

## 2021-01-01 RX ADMIN — EPOETIN ALFA-EPBX 4000 UNITS: 4000 INJECTION, SOLUTION INTRAVENOUS; SUBCUTANEOUS at 11:00

## 2021-01-01 RX ADMIN — APIXABAN 5 MG: 5 TABLET, FILM COATED ORAL at 17:10

## 2021-01-01 RX ADMIN — BUDESONIDE AND FORMOTEROL FUMARATE DIHYDRATE 2 PUFF: 160; 4.5 AEROSOL RESPIRATORY (INHALATION) at 20:26

## 2021-01-01 RX ADMIN — FUROSEMIDE 40 MG: 10 INJECTION, SOLUTION INTRAMUSCULAR; INTRAVENOUS at 20:55

## 2021-01-01 RX ADMIN — CALCITRIOL CAPSULES 0.25 MCG 0.25 MCG: 0.25 CAPSULE ORAL at 08:12

## 2021-01-01 RX ADMIN — CALCITRIOL CAPSULES 0.25 MCG 0.25 MCG: 0.25 CAPSULE ORAL at 17:53

## 2021-01-01 RX ADMIN — Medication 1334 MG: at 12:07

## 2021-01-01 RX ADMIN — OMEPRAZOLE 20 MG: 20 CAPSULE, DELAYED RELEASE ORAL at 17:19

## 2021-01-01 RX ADMIN — Medication 1334 MG: at 18:48

## 2021-01-01 RX ADMIN — GABAPENTIN 300 MG: 300 CAPSULE ORAL at 17:12

## 2021-01-01 RX ADMIN — PIPERACILLIN AND TAZOBACTAM 4.5 G: 4; .5 INJECTION, POWDER, LYOPHILIZED, FOR SOLUTION INTRAVENOUS; PARENTERAL at 17:14

## 2021-01-01 RX ADMIN — METHIMAZOLE 5 MG: 5 TABLET ORAL at 16:51

## 2021-01-01 RX ADMIN — APIXABAN 5 MG: 5 TABLET, FILM COATED ORAL at 18:39

## 2021-01-01 RX ADMIN — DEXTROSE MONOHYDRATE 50 ML: 25 INJECTION, SOLUTION INTRAVENOUS at 14:08

## 2021-01-01 RX ADMIN — ATORVASTATIN CALCIUM 40 MG: 40 TABLET, FILM COATED ORAL at 19:17

## 2021-01-01 RX ADMIN — DOCUSATE SODIUM 50 MG AND SENNOSIDES 8.6 MG 2 TABLET: 8.6; 5 TABLET, FILM COATED ORAL at 04:07

## 2021-01-01 RX ADMIN — SENNOSIDES AND DOCUSATE SODIUM 2 TABLET: 8.6; 5 TABLET ORAL at 08:32

## 2021-01-01 RX ADMIN — OXYCODONE HYDROCHLORIDE 10 MG: 10 TABLET ORAL at 23:49

## 2021-01-01 RX ADMIN — CARVEDILOL 3.12 MG: 3.12 TABLET, FILM COATED ORAL at 18:12

## 2021-01-01 RX ADMIN — MIDODRINE HYDROCHLORIDE 15 MG: 5 TABLET ORAL at 17:18

## 2021-01-01 RX ADMIN — HEPARIN SODIUM 3700 UNITS: 1000 INJECTION, SOLUTION INTRAVENOUS; SUBCUTANEOUS at 15:25

## 2021-01-01 RX ADMIN — SODIUM CHLORIDE 250 MG: 9 INJECTION, SOLUTION INTRAVENOUS at 18:29

## 2021-01-01 RX ADMIN — HEPARIN SODIUM 5000 UNITS: 5000 INJECTION, SOLUTION INTRAVENOUS; SUBCUTANEOUS at 13:15

## 2021-01-01 RX ADMIN — OMEPRAZOLE 20 MG: 20 CAPSULE, DELAYED RELEASE ORAL at 21:46

## 2021-01-01 RX ADMIN — EPOETIN ALFA-EPBX 4000 UNITS: 4000 INJECTION, SOLUTION INTRAVENOUS; SUBCUTANEOUS at 11:30

## 2021-01-01 RX ADMIN — MIDODRINE HYDROCHLORIDE 15 MG: 5 TABLET ORAL at 06:24

## 2021-01-01 RX ADMIN — INSULIN GLARGINE 5 UNITS: 100 INJECTION, SOLUTION SUBCUTANEOUS at 11:06

## 2021-01-01 RX ADMIN — ACETAMINOPHEN 1000 MG: 500 TABLET ORAL at 13:32

## 2021-01-01 RX ADMIN — TAMSULOSIN HYDROCHLORIDE 0.8 MG: 0.4 CAPSULE ORAL at 21:52

## 2021-01-01 RX ADMIN — DOCUSATE SODIUM 50 MG AND SENNOSIDES 8.6 MG 2 TABLET: 8.6; 5 TABLET, FILM COATED ORAL at 06:46

## 2021-01-01 RX ADMIN — MIDODRINE HYDROCHLORIDE 10 MG: 5 TABLET ORAL at 11:10

## 2021-01-01 RX ADMIN — TRAMADOL HYDROCHLORIDE 50 MG: 50 TABLET ORAL at 21:41

## 2021-01-01 RX ADMIN — CALCITRIOL CAPSULES 0.25 MCG 0.25 MCG: 0.25 CAPSULE ORAL at 05:30

## 2021-01-01 RX ADMIN — MIDODRINE HYDROCHLORIDE 10 MG: 5 TABLET ORAL at 08:48

## 2021-01-01 RX ADMIN — TRAMADOL HYDROCHLORIDE 50 MG: 50 TABLET, FILM COATED ORAL at 06:09

## 2021-01-01 RX ADMIN — METHIMAZOLE 7.5 MG: 5 TABLET ORAL at 08:19

## 2021-01-01 RX ADMIN — MIDODRINE HYDROCHLORIDE 10 MG: 5 TABLET ORAL at 06:46

## 2021-01-01 RX ADMIN — CALCITRIOL CAPSULES 0.25 MCG 0.25 MCG: 0.25 CAPSULE ORAL at 17:12

## 2021-01-01 RX ADMIN — MAGNESIUM HYDROXIDE 30 ML: 400 SUSPENSION ORAL at 23:10

## 2021-01-01 RX ADMIN — BENZOCAINE AND MENTHOL, UNSPECIFIED FORM 1 LOZENGE: 15; 3.6 LOZENGE ORAL at 13:30

## 2021-01-01 RX ADMIN — MIDODRINE HYDROCHLORIDE 15 MG: 5 TABLET ORAL at 08:58

## 2021-01-01 RX ADMIN — METOPROLOL TARTRATE 37.5 MG: 25 TABLET, FILM COATED ORAL at 05:25

## 2021-01-01 RX ADMIN — MIDODRINE HYDROCHLORIDE 10 MG: 5 TABLET ORAL at 01:44

## 2021-01-01 RX ADMIN — HEPARIN SODIUM: 200 INJECTION, SOLUTION INTRAVENOUS at 11:00

## 2021-01-01 RX ADMIN — IPRATROPIUM BROMIDE AND ALBUTEROL SULFATE 3 ML: .5; 2.5 SOLUTION RESPIRATORY (INHALATION) at 11:44

## 2021-01-01 RX ADMIN — TRAMADOL HYDROCHLORIDE 100 MG: 50 TABLET, FILM COATED ORAL at 18:35

## 2021-01-01 RX ADMIN — TAMSULOSIN HYDROCHLORIDE 0.8 MG: 0.4 CAPSULE ORAL at 20:25

## 2021-01-01 RX ADMIN — GUAIFENESIN 600 MG: 600 TABLET, EXTENDED RELEASE ORAL at 17:57

## 2021-01-01 RX ADMIN — GABAPENTIN 300 MG: 300 CAPSULE ORAL at 14:28

## 2021-01-01 RX ADMIN — ACETAMINOPHEN 1000 MG: 500 TABLET ORAL at 10:34

## 2021-01-01 RX ADMIN — TAMSULOSIN HYDROCHLORIDE 0.8 MG: 0.4 CAPSULE ORAL at 20:17

## 2021-01-01 RX ADMIN — EPOETIN ALFA-EPBX 4000 UNITS: 4000 INJECTION, SOLUTION INTRAVENOUS; SUBCUTANEOUS at 12:44

## 2021-01-01 RX ADMIN — MICONAZOLE NITRATE 1 EACH: 20 CREAM TOPICAL at 21:27

## 2021-01-01 RX ADMIN — ATORVASTATIN CALCIUM 40 MG: 40 TABLET, FILM COATED ORAL at 01:38

## 2021-01-01 RX ADMIN — GUAIFENESIN AND DEXTROMETHORPHAN 10 ML: 100; 10 SYRUP ORAL at 19:24

## 2021-01-01 RX ADMIN — OMEPRAZOLE 20 MG: 20 CAPSULE, DELAYED RELEASE ORAL at 06:16

## 2021-01-01 RX ADMIN — HEPARIN SODIUM 2000 UNITS: 1000 INJECTION, SOLUTION INTRAVENOUS; SUBCUTANEOUS at 05:45

## 2021-01-01 RX ADMIN — GABAPENTIN 300 MG: 300 CAPSULE ORAL at 08:39

## 2021-01-01 RX ADMIN — CARVEDILOL 3.12 MG: 3.12 TABLET, FILM COATED ORAL at 09:41

## 2021-01-01 RX ADMIN — TAMSULOSIN HYDROCHLORIDE 0.8 MG: 0.4 CAPSULE ORAL at 22:04

## 2021-01-01 RX ADMIN — ATORVASTATIN CALCIUM 40 MG: 40 TABLET, FILM COATED ORAL at 20:25

## 2021-01-01 RX ADMIN — SEVELAMER CARBONATE 2400 MG: 800 TABLET, FILM COATED ORAL at 16:50

## 2021-01-01 RX ADMIN — FUROSEMIDE 40 MG: 10 INJECTION, SOLUTION INTRAMUSCULAR; INTRAVENOUS at 22:56

## 2021-01-01 RX ADMIN — MIDODRINE HYDROCHLORIDE 10 MG: 5 TABLET ORAL at 05:56

## 2021-01-01 RX ADMIN — SEVELAMER CARBONATE 2400 MG: 800 TABLET, FILM COATED ORAL at 18:07

## 2021-01-01 RX ADMIN — METHIMAZOLE 5 MG: 5 TABLET ORAL at 17:26

## 2021-01-01 RX ADMIN — FENTANYL CITRATE 50 MCG: 50 INJECTION, SOLUTION INTRAMUSCULAR; INTRAVENOUS at 11:16

## 2021-01-01 RX ADMIN — APIXABAN 5 MG: 5 TABLET, FILM COATED ORAL at 16:44

## 2021-01-01 RX ADMIN — FUROSEMIDE 40 MG: 10 INJECTION, SOLUTION INTRAMUSCULAR; INTRAVENOUS at 15:33

## 2021-01-01 RX ADMIN — INSULIN LISPRO 3 UNITS: 100 INJECTION, SOLUTION INTRAVENOUS; SUBCUTANEOUS at 17:08

## 2021-01-01 RX ADMIN — SEVELAMER CARBONATE 1600 MG: 800 TABLET, FILM COATED ORAL at 17:47

## 2021-01-01 RX ADMIN — SEVELAMER CARBONATE 1600 MG: 800 TABLET, FILM COATED ORAL at 12:07

## 2021-01-01 RX ADMIN — GUAIFENESIN 600 MG: 600 TABLET, EXTENDED RELEASE ORAL at 14:37

## 2021-01-01 RX ADMIN — DEXTROSE MONOHYDRATE 50 ML: 25 INJECTION, SOLUTION INTRAVENOUS at 06:30

## 2021-01-01 RX ADMIN — CALCITRIOL CAPSULES 0.25 MCG 0.25 MCG: 0.25 CAPSULE ORAL at 05:56

## 2021-01-01 RX ADMIN — ACETAMINOPHEN 1000 MG: 500 TABLET ORAL at 20:33

## 2021-01-01 RX ADMIN — SODIUM CHLORIDE 500 ML: 9 INJECTION, SOLUTION INTRAVENOUS at 15:53

## 2021-01-01 RX ADMIN — SEVELAMER CARBONATE 2400 MG: 800 TABLET, FILM COATED ORAL at 17:19

## 2021-01-01 RX ADMIN — HEPARIN SODIUM 5000 UNITS: 5000 INJECTION, SOLUTION INTRAVENOUS; SUBCUTANEOUS at 21:29

## 2021-01-01 RX ADMIN — INSULIN LISPRO 1 UNITS: 100 INJECTION, SOLUTION INTRAVENOUS; SUBCUTANEOUS at 20:32

## 2021-01-01 RX ADMIN — SEVELAMER CARBONATE 2400 MG: 800 TABLET, FILM COATED ORAL at 12:16

## 2021-01-01 RX ADMIN — PIPERACILLIN AND TAZOBACTAM 4.5 G: 4; .5 INJECTION, POWDER, LYOPHILIZED, FOR SOLUTION INTRAVENOUS; PARENTERAL at 12:06

## 2021-01-01 RX ADMIN — METOPROLOL TARTRATE 37.5 MG: 25 TABLET, FILM COATED ORAL at 04:11

## 2021-01-01 RX ADMIN — PIPERACILLIN SODIUM AND TAZOBACTAM SODIUM 3.38 G: 3; .375 INJECTION, POWDER, LYOPHILIZED, FOR SOLUTION INTRAVENOUS at 17:12

## 2021-01-01 RX ADMIN — APIXABAN 5 MG: 5 TABLET, FILM COATED ORAL at 18:41

## 2021-01-01 RX ADMIN — METOPROLOL TARTRATE 37.5 MG: 25 TABLET, FILM COATED ORAL at 17:46

## 2021-01-01 RX ADMIN — GUAIFENESIN 600 MG: 600 TABLET, EXTENDED RELEASE ORAL at 20:25

## 2021-01-01 RX ADMIN — TRAMADOL HYDROCHLORIDE 50 MG: 50 TABLET ORAL at 22:00

## 2021-01-01 RX ADMIN — METHIMAZOLE 7.5 MG: 5 TABLET ORAL at 08:45

## 2021-01-01 RX ADMIN — MIDODRINE HYDROCHLORIDE 10 MG: 5 TABLET ORAL at 16:51

## 2021-01-01 RX ADMIN — MIDODRINE HYDROCHLORIDE 15 MG: 5 TABLET ORAL at 17:32

## 2021-01-01 RX ADMIN — TAMSULOSIN HYDROCHLORIDE 0.8 MG: 0.4 CAPSULE ORAL at 21:56

## 2021-01-01 RX ADMIN — METHIMAZOLE 7.5 MG: 5 TABLET ORAL at 08:21

## 2021-01-01 RX ADMIN — METHIMAZOLE 5 MG: 5 TABLET ORAL at 06:11

## 2021-01-01 RX ADMIN — ATORVASTATIN CALCIUM 40 MG: 40 TABLET, FILM COATED ORAL at 20:33

## 2021-01-01 RX ADMIN — SEVELAMER CARBONATE 2400 MG: 800 TABLET, FILM COATED ORAL at 17:28

## 2021-01-01 RX ADMIN — PREDNISONE 40 MG: 20 TABLET ORAL at 17:27

## 2021-01-01 RX ADMIN — CLOPIDOGREL BISULFATE 75 MG: 75 TABLET ORAL at 05:28

## 2021-01-01 RX ADMIN — Medication 1334 MG: at 06:03

## 2021-01-01 RX ADMIN — GABAPENTIN 300 MG: 300 CAPSULE ORAL at 05:59

## 2021-01-01 RX ADMIN — TRAZODONE HYDROCHLORIDE 50 MG: 50 TABLET ORAL at 22:54

## 2021-01-01 RX ADMIN — GUAIFENESIN 600 MG: 600 TABLET, EXTENDED RELEASE ORAL at 06:44

## 2021-01-01 RX ADMIN — NITROGLYCERIN 10 ML: 20 INJECTION INTRAVENOUS at 11:09

## 2021-01-01 RX ADMIN — MICONAZOLE NITRATE: 20 CREAM TOPICAL at 08:08

## 2021-01-01 RX ADMIN — SEVELAMER CARBONATE 2400 MG: 800 TABLET, FILM COATED ORAL at 12:37

## 2021-01-01 RX ADMIN — MORPHINE SULFATE 15 MG: 15 TABLET, FILM COATED, EXTENDED RELEASE ORAL at 18:26

## 2021-01-01 RX ADMIN — METHIMAZOLE 5 MG: 5 TABLET ORAL at 17:16

## 2021-01-01 RX ADMIN — METOPROLOL TARTRATE 37.5 MG: 25 TABLET, FILM COATED ORAL at 05:58

## 2021-01-01 RX ADMIN — TRAMADOL HYDROCHLORIDE 50 MG: 50 TABLET ORAL at 21:48

## 2021-01-01 RX ADMIN — PREDNISONE 40 MG: 20 TABLET ORAL at 05:55

## 2021-01-01 RX ADMIN — FLUCONAZOLE 100 MG: 100 TABLET ORAL at 08:38

## 2021-01-01 RX ADMIN — MORPHINE SULFATE 15 MG: 15 TABLET, FILM COATED, EXTENDED RELEASE ORAL at 05:58

## 2021-01-01 RX ADMIN — MIDODRINE HYDROCHLORIDE 10 MG: 5 TABLET ORAL at 17:13

## 2021-01-01 RX ADMIN — CALCITRIOL CAPSULES 0.25 MCG 0.25 MCG: 0.25 CAPSULE ORAL at 05:13

## 2021-01-01 RX ADMIN — GABAPENTIN 300 MG: 300 CAPSULE ORAL at 11:36

## 2021-01-01 RX ADMIN — GABAPENTIN 300 MG: 300 CAPSULE ORAL at 16:43

## 2021-01-01 RX ADMIN — SEVELAMER CARBONATE 2400 MG: 800 TABLET, FILM COATED ORAL at 17:56

## 2021-01-01 RX ADMIN — SODIUM CHLORIDE 500 ML: 9 INJECTION, SOLUTION INTRAVENOUS at 01:50

## 2021-01-01 RX ADMIN — GUAIFENESIN 600 MG: 600 TABLET, EXTENDED RELEASE ORAL at 20:33

## 2021-01-01 RX ADMIN — FUROSEMIDE 20 MG: 10 INJECTION, SOLUTION INTRAMUSCULAR; INTRAVENOUS at 05:04

## 2021-01-01 RX ADMIN — HEPARIN SODIUM 5000 UNITS: 5000 INJECTION, SOLUTION INTRAVENOUS; SUBCUTANEOUS at 20:48

## 2021-01-01 RX ADMIN — HEPARIN SODIUM 1800 UNITS: 1000 INJECTION, SOLUTION INTRAVENOUS; SUBCUTANEOUS at 07:40

## 2021-01-01 RX ADMIN — HEPARIN SODIUM 5000 UNITS: 5000 INJECTION, SOLUTION INTRAVENOUS; SUBCUTANEOUS at 15:42

## 2021-01-01 RX ADMIN — EPOETIN ALFA-EPBX 10000 UNITS: 10000 INJECTION, SOLUTION INTRAVENOUS; SUBCUTANEOUS at 07:00

## 2021-01-01 RX ADMIN — APIXABAN 2.5 MG: 5 TABLET, FILM COATED ORAL at 20:21

## 2021-01-01 RX ADMIN — MIDODRINE HYDROCHLORIDE 10 MG: 5 TABLET ORAL at 16:02

## 2021-01-01 RX ADMIN — MICONAZOLE NITRATE: 20 CREAM TOPICAL at 10:29

## 2021-01-01 RX ADMIN — IPRATROPIUM BROMIDE AND ALBUTEROL SULFATE 3 ML: .5; 2.5 SOLUTION RESPIRATORY (INHALATION) at 22:26

## 2021-01-01 RX ADMIN — METHIMAZOLE 5 MG: 5 TABLET ORAL at 16:33

## 2021-01-01 RX ADMIN — TAMSULOSIN HYDROCHLORIDE 0.8 MG: 0.4 CAPSULE ORAL at 21:39

## 2021-01-01 RX ADMIN — METHIMAZOLE 5 MG: 5 TABLET ORAL at 20:42

## 2021-01-01 RX ADMIN — INSULIN GLARGINE 5 UNITS: 100 INJECTION, SOLUTION SUBCUTANEOUS at 22:05

## 2021-01-01 RX ADMIN — MIDODRINE HYDROCHLORIDE 15 MG: 5 TABLET ORAL at 12:47

## 2021-01-01 RX ADMIN — GABAPENTIN 300 MG: 300 CAPSULE ORAL at 01:39

## 2021-01-01 RX ADMIN — METHIMAZOLE 5 MG: 5 TABLET ORAL at 17:35

## 2021-01-01 RX ADMIN — MIDODRINE HYDROCHLORIDE 10 MG: 5 TABLET ORAL at 15:30

## 2021-01-01 RX ADMIN — ACETAMINOPHEN 650 MG: 325 TABLET, FILM COATED ORAL at 17:12

## 2021-01-01 RX ADMIN — OXYCODONE 5 MG: 5 TABLET ORAL at 14:34

## 2021-01-01 RX ADMIN — GABAPENTIN 300 MG: 300 CAPSULE ORAL at 21:00

## 2021-01-01 RX ADMIN — TRAMADOL HYDROCHLORIDE 50 MG: 50 TABLET, FILM COATED ORAL at 15:13

## 2021-01-01 RX ADMIN — Medication 1334 MG: at 12:23

## 2021-01-01 RX ADMIN — INSULIN LISPRO 3 UNITS: 100 INJECTION, SOLUTION INTRAVENOUS; SUBCUTANEOUS at 08:52

## 2021-01-01 RX ADMIN — DEXTROSE MONOHYDRATE 50 ML: 25 INJECTION, SOLUTION INTRAVENOUS at 01:45

## 2021-01-01 RX ADMIN — GUAIFENESIN 600 MG: 600 TABLET, EXTENDED RELEASE ORAL at 17:50

## 2021-01-01 RX ADMIN — GUAIFENESIN 600 MG: 600 TABLET, EXTENDED RELEASE ORAL at 18:03

## 2021-01-01 RX ADMIN — METHIMAZOLE 5 MG: 5 TABLET ORAL at 06:31

## 2021-01-01 RX ADMIN — POTASSIUM CHLORIDE 40 MEQ: 1500 TABLET, EXTENDED RELEASE ORAL at 14:48

## 2021-01-01 RX ADMIN — FLUCONAZOLE 100 MG: 100 TABLET ORAL at 18:49

## 2021-01-01 RX ADMIN — METHIMAZOLE 5 MG: 5 TABLET ORAL at 17:18

## 2021-01-01 RX ADMIN — MIDODRINE HYDROCHLORIDE 10 MG: 5 TABLET ORAL at 08:16

## 2021-01-01 RX ADMIN — METOPROLOL TARTRATE 25 MG: 25 TABLET, FILM COATED ORAL at 05:47

## 2021-01-01 RX ADMIN — GUAIFENESIN 600 MG: 600 TABLET, EXTENDED RELEASE ORAL at 14:00

## 2021-01-01 RX ADMIN — SEVELAMER CARBONATE 1600 MG: 800 TABLET, FILM COATED ORAL at 12:01

## 2021-01-01 RX ADMIN — CLOPIDOGREL BISULFATE 75 MG: 75 TABLET ORAL at 17:12

## 2021-01-01 RX ADMIN — FLUCONAZOLE 100 MG: 100 TABLET ORAL at 08:29

## 2021-01-01 RX ADMIN — Medication 16 G: at 17:25

## 2021-01-01 RX ADMIN — DOCUSATE SODIUM 50 MG AND SENNOSIDES 8.6 MG 2 TABLET: 8.6; 5 TABLET, FILM COATED ORAL at 04:12

## 2021-01-01 RX ADMIN — METHIMAZOLE 5 MG: 5 TABLET ORAL at 04:53

## 2021-01-01 RX ADMIN — GABAPENTIN 100 MG: 100 CAPSULE ORAL at 17:18

## 2021-01-01 RX ADMIN — METOPROLOL TARTRATE 37.5 MG: 25 TABLET, FILM COATED ORAL at 04:12

## 2021-01-01 RX ADMIN — GABAPENTIN 300 MG: 300 CAPSULE ORAL at 12:41

## 2021-01-01 RX ADMIN — ATORVASTATIN CALCIUM 40 MG: 40 TABLET, FILM COATED ORAL at 20:32

## 2021-01-01 RX ADMIN — VANCOMYCIN HYDROCHLORIDE 2000 MG: 500 INJECTION, POWDER, LYOPHILIZED, FOR SOLUTION INTRAVENOUS at 18:19

## 2021-01-01 RX ADMIN — SEVELAMER CARBONATE 1600 MG: 800 TABLET, FILM COATED ORAL at 16:34

## 2021-01-01 RX ADMIN — TRAMADOL HYDROCHLORIDE 50 MG: 50 TABLET ORAL at 20:48

## 2021-01-01 RX ADMIN — METHIMAZOLE 5 MG: 5 TABLET ORAL at 17:19

## 2021-01-01 RX ADMIN — Medication 1334 MG: at 11:33

## 2021-01-01 RX ADMIN — ACETAMINOPHEN 650 MG: 325 TABLET, FILM COATED ORAL at 13:29

## 2021-01-01 RX ADMIN — HEPARIN SODIUM 5000 UNITS: 5000 INJECTION, SOLUTION INTRAVENOUS; SUBCUTANEOUS at 05:36

## 2021-01-01 RX ADMIN — CLOPIDOGREL BISULFATE 75 MG: 75 TABLET ORAL at 05:29

## 2021-01-01 RX ADMIN — APIXABAN 5 MG: 5 TABLET, FILM COATED ORAL at 05:06

## 2021-01-01 RX ADMIN — INSULIN HUMAN 1 UNITS: 100 INJECTION, SOLUTION PARENTERAL at 08:02

## 2021-01-01 RX ADMIN — MIDODRINE HYDROCHLORIDE 15 MG: 5 TABLET ORAL at 17:44

## 2021-01-01 RX ADMIN — GUAIFENESIN 600 MG: 600 TABLET, EXTENDED RELEASE ORAL at 06:11

## 2021-01-01 RX ADMIN — CLOPIDOGREL BISULFATE 75 MG: 75 TABLET ORAL at 05:54

## 2021-01-01 RX ADMIN — IPRATROPIUM BROMIDE AND ALBUTEROL SULFATE 3 ML: .5; 2.5 SOLUTION RESPIRATORY (INHALATION) at 05:21

## 2021-01-01 RX ADMIN — METHIMAZOLE 5 MG: 5 TABLET ORAL at 05:47

## 2021-01-01 RX ADMIN — METOPROLOL TARTRATE 37.5 MG: 25 TABLET, FILM COATED ORAL at 04:42

## 2021-01-01 RX ADMIN — MIDODRINE HYDROCHLORIDE 10 MG: 5 TABLET ORAL at 09:28

## 2021-01-01 RX ADMIN — LISINOPRIL 5 MG: 5 TABLET ORAL at 05:20

## 2021-01-01 RX ADMIN — CALCITRIOL CAPSULES 0.25 MCG 0.25 MCG: 0.25 CAPSULE ORAL at 05:59

## 2021-01-01 RX ADMIN — MEROPENEM 500 MG: 500 INJECTION, POWDER, FOR SOLUTION INTRAVENOUS at 04:54

## 2021-01-01 RX ADMIN — GUAIFENESIN 600 MG: 600 TABLET, EXTENDED RELEASE ORAL at 05:32

## 2021-01-01 RX ADMIN — METHIMAZOLE 7.5 MG: 5 TABLET ORAL at 10:26

## 2021-01-01 RX ADMIN — GUAIFENESIN 600 MG: 600 TABLET, EXTENDED RELEASE ORAL at 17:53

## 2021-01-01 RX ADMIN — VANCOMYCIN HYDROCHLORIDE 1000 MG: 500 INJECTION, POWDER, LYOPHILIZED, FOR SOLUTION INTRAVENOUS at 15:18

## 2021-01-01 RX ADMIN — GABAPENTIN 300 MG: 300 CAPSULE ORAL at 12:46

## 2021-01-01 RX ADMIN — INSULIN GLARGINE 10 UNITS: 100 INJECTION, SOLUTION SUBCUTANEOUS at 18:29

## 2021-01-01 RX ADMIN — ATORVASTATIN CALCIUM 40 MG: 40 TABLET, FILM COATED ORAL at 21:24

## 2021-01-01 RX ADMIN — SEVELAMER CARBONATE 1600 MG: 800 TABLET, FILM COATED ORAL at 06:34

## 2021-01-01 RX ADMIN — GUAIFENESIN 600 MG: 600 TABLET, EXTENDED RELEASE ORAL at 02:09

## 2021-01-01 RX ADMIN — PIPERACILLIN AND TAZOBACTAM 4.5 G: 4; .5 INJECTION, POWDER, LYOPHILIZED, FOR SOLUTION INTRAVENOUS; PARENTERAL at 17:13

## 2021-01-01 RX ADMIN — CALCITRIOL CAPSULES 0.25 MCG 0.25 MCG: 0.25 CAPSULE ORAL at 08:25

## 2021-01-01 RX ADMIN — SEVELAMER CARBONATE 1600 MG: 800 TABLET, FILM COATED ORAL at 09:39

## 2021-01-01 RX ADMIN — TRAZODONE HYDROCHLORIDE 50 MG: 50 TABLET ORAL at 20:56

## 2021-01-01 RX ADMIN — APIXABAN 5 MG: 5 TABLET, FILM COATED ORAL at 05:37

## 2021-01-01 RX ADMIN — ATORVASTATIN CALCIUM 40 MG: 40 TABLET, FILM COATED ORAL at 22:06

## 2021-01-01 RX ADMIN — METHIMAZOLE 5 MG: 5 TABLET ORAL at 18:41

## 2021-01-01 RX ADMIN — CLOPIDOGREL BISULFATE 75 MG: 75 TABLET ORAL at 06:16

## 2021-01-01 RX ADMIN — MIDODRINE HYDROCHLORIDE 10 MG: 5 TABLET ORAL at 12:55

## 2021-01-01 RX ADMIN — CALCITRIOL CAPSULES 0.25 MCG 0.25 MCG: 0.25 CAPSULE ORAL at 05:20

## 2021-01-01 RX ADMIN — ACETAMINOPHEN 1000 MG: 500 TABLET ORAL at 21:49

## 2021-01-01 RX ADMIN — GABAPENTIN 300 MG: 300 CAPSULE ORAL at 12:23

## 2021-01-01 RX ADMIN — GUAIFENESIN 600 MG: 600 TABLET, EXTENDED RELEASE ORAL at 20:22

## 2021-01-01 RX ADMIN — OXYCODONE 5 MG: 5 TABLET ORAL at 15:30

## 2021-01-01 RX ADMIN — MIDODRINE HYDROCHLORIDE 10 MG: 5 TABLET ORAL at 12:17

## 2021-01-01 RX ADMIN — GABAPENTIN 300 MG: 300 CAPSULE ORAL at 00:28

## 2021-01-01 RX ADMIN — SEVELAMER CARBONATE 1600 MG: 800 TABLET, FILM COATED ORAL at 17:15

## 2021-01-01 RX ADMIN — TRAZODONE HYDROCHLORIDE 50 MG: 50 TABLET ORAL at 19:17

## 2021-01-01 RX ADMIN — OMEPRAZOLE 20 MG: 20 CAPSULE, DELAYED RELEASE ORAL at 21:38

## 2021-01-01 RX ADMIN — OMEPRAZOLE 20 MG: 20 CAPSULE, DELAYED RELEASE ORAL at 05:46

## 2021-01-01 RX ADMIN — GUAIFENESIN 600 MG: 600 TABLET, EXTENDED RELEASE ORAL at 06:00

## 2021-01-01 RX ADMIN — METHIMAZOLE 5 MG: 5 TABLET ORAL at 20:39

## 2021-01-01 RX ADMIN — ACETAMINOPHEN 650 MG: 325 TABLET, FILM COATED ORAL at 06:00

## 2021-01-01 RX ADMIN — OMEPRAZOLE 20 MG: 20 CAPSULE, DELAYED RELEASE ORAL at 05:45

## 2021-01-01 RX ADMIN — ATORVASTATIN CALCIUM 40 MG: 40 TABLET, FILM COATED ORAL at 17:13

## 2021-01-01 RX ADMIN — ATORVASTATIN CALCIUM 40 MG: 40 TABLET, FILM COATED ORAL at 17:47

## 2021-01-01 RX ADMIN — METHIMAZOLE 5 MG: 5 TABLET ORAL at 17:31

## 2021-01-01 RX ADMIN — ACETAMINOPHEN 500 MG: 500 TABLET ORAL at 19:31

## 2021-01-01 RX ADMIN — ACETAMINOPHEN 1000 MG: 500 TABLET ORAL at 20:49

## 2021-01-01 RX ADMIN — OMEPRAZOLE 20 MG: 20 CAPSULE, DELAYED RELEASE ORAL at 07:55

## 2021-01-01 RX ADMIN — METHIMAZOLE 5 MG: 5 TABLET ORAL at 06:25

## 2021-01-01 RX ADMIN — GABAPENTIN 300 MG: 300 CAPSULE ORAL at 21:56

## 2021-01-01 RX ADMIN — ATORVASTATIN CALCIUM 40 MG: 40 TABLET, FILM COATED ORAL at 22:03

## 2021-01-01 RX ADMIN — TAMSULOSIN HYDROCHLORIDE 0.8 MG: 0.4 CAPSULE ORAL at 22:03

## 2021-01-01 RX ADMIN — GUAIFENESIN 600 MG: 600 TABLET, EXTENDED RELEASE ORAL at 06:19

## 2021-01-01 RX ADMIN — SEVELAMER CARBONATE 2400 MG: 800 TABLET, FILM COATED ORAL at 08:52

## 2021-01-01 RX ADMIN — DOCUSATE SODIUM 50 MG AND SENNOSIDES 8.6 MG 2 TABLET: 8.6; 5 TABLET, FILM COATED ORAL at 05:34

## 2021-01-01 RX ADMIN — ACETAMINOPHEN 1000 MG: 500 TABLET ORAL at 13:01

## 2021-01-01 RX ADMIN — SEVELAMER CARBONATE 1600 MG: 800 TABLET, FILM COATED ORAL at 06:53

## 2021-01-01 RX ADMIN — OMEPRAZOLE 20 MG: 20 CAPSULE, DELAYED RELEASE ORAL at 04:12

## 2021-01-01 RX ADMIN — MIDODRINE HYDROCHLORIDE 10 MG: 5 TABLET ORAL at 06:32

## 2021-01-01 RX ADMIN — INSULIN GLARGINE 8 UNITS: 100 INJECTION, SOLUTION SUBCUTANEOUS at 07:12

## 2021-01-01 RX ADMIN — SEVELAMER CARBONATE 2400 MG: 800 TABLET, FILM COATED ORAL at 16:59

## 2021-01-01 RX ADMIN — MORPHINE SULFATE 15 MG: 15 TABLET, FILM COATED, EXTENDED RELEASE ORAL at 05:47

## 2021-01-01 RX ADMIN — EPOETIN ALFA-EPBX 4000 UNITS: 4000 INJECTION, SOLUTION INTRAVENOUS; SUBCUTANEOUS at 08:16

## 2021-01-01 RX ADMIN — INSULIN GLARGINE 8 UNITS: 100 INJECTION, SOLUTION SUBCUTANEOUS at 06:03

## 2021-01-01 RX ADMIN — CARVEDILOL 3.12 MG: 3.12 TABLET, FILM COATED ORAL at 06:11

## 2021-01-01 RX ADMIN — INSULIN LISPRO 3 UNITS: 100 INJECTION, SOLUTION INTRAVENOUS; SUBCUTANEOUS at 17:50

## 2021-01-01 RX ADMIN — SEVELAMER CARBONATE 1600 MG: 800 TABLET, FILM COATED ORAL at 06:32

## 2021-01-01 RX ADMIN — APIXABAN 5 MG: 5 TABLET, FILM COATED ORAL at 17:47

## 2021-01-01 RX ADMIN — HEPARIN SODIUM 1500 UNITS: 1000 INJECTION, SOLUTION INTRAVENOUS; SUBCUTANEOUS at 11:18

## 2021-01-01 RX ADMIN — TRAMADOL HYDROCHLORIDE 50 MG: 50 TABLET, FILM COATED ORAL at 22:54

## 2021-01-01 RX ADMIN — GABAPENTIN 300 MG: 300 CAPSULE ORAL at 05:28

## 2021-01-01 RX ADMIN — MIDODRINE HYDROCHLORIDE 10 MG: 5 TABLET ORAL at 18:53

## 2021-01-01 RX ADMIN — CLOPIDOGREL BISULFATE 75 MG: 75 TABLET ORAL at 04:53

## 2021-01-01 RX ADMIN — MIDODRINE HYDROCHLORIDE 10 MG: 5 TABLET ORAL at 13:28

## 2021-01-01 RX ADMIN — MIDODRINE HYDROCHLORIDE 10 MG: 5 TABLET ORAL at 17:18

## 2021-01-01 RX ADMIN — GABAPENTIN 300 MG: 300 CAPSULE ORAL at 08:27

## 2021-01-01 RX ADMIN — METHIMAZOLE 5 MG: 5 TABLET ORAL at 18:54

## 2021-01-01 RX ADMIN — DIBASIC SODIUM PHOSPHATE, MONOBASIC POTASSIUM PHOSPHATE AND MONOBASIC SODIUM PHOSPHATE 250 MG: 852; 155; 130 TABLET ORAL at 14:57

## 2021-01-01 RX ADMIN — ACETAMINOPHEN 1000 MG: 500 TABLET ORAL at 13:33

## 2021-01-01 RX ADMIN — TRAZODONE HYDROCHLORIDE 50 MG: 50 TABLET ORAL at 20:19

## 2021-01-01 RX ADMIN — MIDODRINE HYDROCHLORIDE 15 MG: 5 TABLET ORAL at 08:34

## 2021-01-01 RX ADMIN — MIDODRINE HYDROCHLORIDE 10 MG: 5 TABLET ORAL at 17:56

## 2021-01-01 RX ADMIN — CARVEDILOL 3.12 MG: 3.12 TABLET, FILM COATED ORAL at 05:53

## 2021-01-01 RX ADMIN — METHIMAZOLE 5 MG: 5 TABLET ORAL at 20:58

## 2021-01-01 RX ADMIN — GABAPENTIN 300 MG: 300 CAPSULE ORAL at 05:09

## 2021-01-01 RX ADMIN — CALCITRIOL CAPSULES 0.25 MCG 0.25 MCG: 0.25 CAPSULE ORAL at 06:46

## 2021-01-01 RX ADMIN — TAMSULOSIN HYDROCHLORIDE 0.8 MG: 0.4 CAPSULE ORAL at 21:31

## 2021-01-01 RX ADMIN — TRAMADOL HYDROCHLORIDE 50 MG: 50 TABLET ORAL at 22:35

## 2021-01-01 RX ADMIN — APIXABAN 2.5 MG: 5 TABLET, FILM COATED ORAL at 20:39

## 2021-01-01 RX ADMIN — BUDESONIDE 0.5 MG: 0.5 SUSPENSION RESPIRATORY (INHALATION) at 19:48

## 2021-01-01 RX ADMIN — METHIMAZOLE 5 MG: 5 TABLET ORAL at 07:56

## 2021-01-01 RX ADMIN — METHIMAZOLE 5 MG: 5 TABLET ORAL at 17:12

## 2021-01-01 RX ADMIN — CALCITRIOL CAPSULES 0.25 MCG 0.25 MCG: 0.25 CAPSULE ORAL at 06:19

## 2021-01-01 RX ADMIN — METHIMAZOLE 5 MG: 5 TABLET ORAL at 17:13

## 2021-01-01 RX ADMIN — MIDODRINE HYDROCHLORIDE 10 MG: 5 TABLET ORAL at 17:31

## 2021-01-01 RX ADMIN — SODIUM CHLORIDE 250 ML: 9 INJECTION, SOLUTION INTRAVENOUS at 18:49

## 2021-01-01 RX ADMIN — TRAZODONE HYDROCHLORIDE 50 MG: 50 TABLET ORAL at 20:51

## 2021-01-01 RX ADMIN — TAMSULOSIN HYDROCHLORIDE 0.8 MG: 0.4 CAPSULE ORAL at 21:47

## 2021-01-01 RX ADMIN — ATORVASTATIN CALCIUM 40 MG: 40 TABLET, FILM COATED ORAL at 20:27

## 2021-01-01 RX ADMIN — IPRATROPIUM BROMIDE AND ALBUTEROL SULFATE 3 ML: 2.5; .5 SOLUTION RESPIRATORY (INHALATION) at 00:40

## 2021-01-01 RX ADMIN — TRAMADOL HYDROCHLORIDE 50 MG: 50 TABLET, FILM COATED ORAL at 09:23

## 2021-01-01 RX ADMIN — GABAPENTIN 300 MG: 300 CAPSULE ORAL at 08:16

## 2021-01-01 RX ADMIN — MORPHINE SULFATE 15 MG: 15 TABLET, FILM COATED, EXTENDED RELEASE ORAL at 17:15

## 2021-01-01 RX ADMIN — GUAIFENESIN 600 MG: 600 TABLET, EXTENDED RELEASE ORAL at 11:10

## 2021-01-01 RX ADMIN — DOCUSATE SODIUM 50 MG AND SENNOSIDES 8.6 MG 2 TABLET: 8.6; 5 TABLET, FILM COATED ORAL at 05:36

## 2021-01-01 RX ADMIN — HEPARIN SODIUM 5000 UNITS: 5000 INJECTION, SOLUTION INTRAVENOUS; SUBCUTANEOUS at 22:56

## 2021-01-01 RX ADMIN — MIDODRINE HYDROCHLORIDE 10 MG: 5 TABLET ORAL at 17:08

## 2021-01-01 RX ADMIN — ATORVASTATIN CALCIUM 40 MG: 40 TABLET, FILM COATED ORAL at 17:33

## 2021-01-01 RX ADMIN — TRAMADOL HYDROCHLORIDE 50 MG: 50 TABLET ORAL at 00:35

## 2021-01-01 RX ADMIN — ACETAMINOPHEN 1000 MG: 500 TABLET ORAL at 20:52

## 2021-01-01 RX ADMIN — ATORVASTATIN CALCIUM 40 MG: 40 TABLET, FILM COATED ORAL at 20:05

## 2021-01-01 RX ADMIN — SEVELAMER CARBONATE 1600 MG: 800 TABLET, FILM COATED ORAL at 12:14

## 2021-01-01 RX ADMIN — GUAIFENESIN 600 MG: 600 TABLET, EXTENDED RELEASE ORAL at 17:33

## 2021-01-01 RX ADMIN — SODIUM CHLORIDE 500 ML: 9 INJECTION, SOLUTION INTRAVENOUS at 10:24

## 2021-01-01 RX ADMIN — SEVELAMER CARBONATE 1600 MG: 800 TABLET, FILM COATED ORAL at 11:55

## 2021-01-01 RX ADMIN — METOPROLOL TARTRATE 37.5 MG: 25 TABLET, FILM COATED ORAL at 17:34

## 2021-01-01 RX ADMIN — ATORVASTATIN CALCIUM 40 MG: 40 TABLET, FILM COATED ORAL at 20:52

## 2021-01-01 RX ADMIN — OMEPRAZOLE 20 MG: 20 CAPSULE, DELAYED RELEASE ORAL at 17:33

## 2021-01-01 RX ADMIN — METHIMAZOLE 5 MG: 5 TABLET ORAL at 22:01

## 2021-01-01 RX ADMIN — GUAIFENESIN 600 MG: 600 TABLET, EXTENDED RELEASE ORAL at 19:53

## 2021-01-01 RX ADMIN — APIXABAN 2.5 MG: 5 TABLET, FILM COATED ORAL at 21:38

## 2021-01-01 RX ADMIN — CEFAZOLIN SODIUM 1 G: 1 INJECTION, SOLUTION INTRAVENOUS at 16:34

## 2021-01-01 RX ADMIN — MIDODRINE HYDROCHLORIDE 15 MG: 5 TABLET ORAL at 09:32

## 2021-01-01 RX ADMIN — TRAMADOL HYDROCHLORIDE 50 MG: 50 TABLET ORAL at 22:04

## 2021-01-01 RX ADMIN — CALCITRIOL CAPSULES 0.25 MCG 0.25 MCG: 0.25 CAPSULE ORAL at 04:07

## 2021-01-01 RX ADMIN — INSULIN GLARGINE 10 UNITS: 100 INJECTION, SOLUTION SUBCUTANEOUS at 21:00

## 2021-01-01 RX ADMIN — Medication 1334 MG: at 10:41

## 2021-01-01 RX ADMIN — GABAPENTIN 300 MG: 300 CAPSULE ORAL at 20:30

## 2021-01-01 RX ADMIN — OMEPRAZOLE 20 MG: 20 CAPSULE, DELAYED RELEASE ORAL at 06:11

## 2021-01-01 RX ADMIN — OMEPRAZOLE 20 MG: 20 CAPSULE, DELAYED RELEASE ORAL at 18:03

## 2021-01-01 RX ADMIN — CALCITRIOL CAPSULES 0.25 MCG 0.25 MCG: 0.25 CAPSULE ORAL at 05:47

## 2021-01-01 RX ADMIN — ATORVASTATIN CALCIUM 40 MG: 40 TABLET, FILM COATED ORAL at 21:38

## 2021-01-01 RX ADMIN — METHIMAZOLE 5 MG: 5 TABLET ORAL at 06:34

## 2021-01-01 RX ADMIN — SEVELAMER CARBONATE 2400 MG: 800 TABLET, FILM COATED ORAL at 08:40

## 2021-01-01 RX ADMIN — GABAPENTIN 300 MG: 300 CAPSULE ORAL at 05:03

## 2021-01-01 RX ADMIN — APIXABAN 2.5 MG: 5 TABLET, FILM COATED ORAL at 08:33

## 2021-01-01 RX ADMIN — OXYCODONE HYDROCHLORIDE 5 MG: 5 TABLET ORAL at 17:11

## 2021-01-01 RX ADMIN — MIDODRINE HYDROCHLORIDE 10 MG: 5 TABLET ORAL at 12:51

## 2021-01-01 RX ADMIN — METHIMAZOLE 5 MG: 5 TABLET ORAL at 05:05

## 2021-01-01 RX ADMIN — ATORVASTATIN CALCIUM 40 MG: 40 TABLET, FILM COATED ORAL at 22:55

## 2021-01-01 RX ADMIN — ACETAMINOPHEN 1000 MG: 500 TABLET ORAL at 09:00

## 2021-01-01 RX ADMIN — TAMSULOSIN HYDROCHLORIDE 0.8 MG: 0.4 CAPSULE ORAL at 21:45

## 2021-01-01 RX ADMIN — ACETAMINOPHEN 1000 MG: 500 TABLET, FILM COATED ORAL at 22:55

## 2021-01-01 RX ADMIN — MIDODRINE HYDROCHLORIDE 15 MG: 5 TABLET ORAL at 06:32

## 2021-01-01 RX ADMIN — TAMSULOSIN HYDROCHLORIDE 0.8 MG: 0.4 CAPSULE ORAL at 21:50

## 2021-01-01 RX ADMIN — SEVELAMER CARBONATE 1600 MG: 800 TABLET, FILM COATED ORAL at 17:58

## 2021-01-01 RX ADMIN — GUAIFENESIN 600 MG: 600 TABLET, EXTENDED RELEASE ORAL at 12:17

## 2021-01-01 RX ADMIN — TRAMADOL HYDROCHLORIDE 25 MG: 50 TABLET, FILM COATED ORAL at 22:35

## 2021-01-01 RX ADMIN — DOCUSATE SODIUM 50 MG AND SENNOSIDES 8.6 MG 2 TABLET: 8.6; 5 TABLET, FILM COATED ORAL at 05:28

## 2021-01-01 RX ADMIN — MIDODRINE HYDROCHLORIDE 10 MG: 5 TABLET ORAL at 06:48

## 2021-01-01 RX ADMIN — CALCITRIOL CAPSULES 0.25 MCG 0.25 MCG: 0.25 CAPSULE ORAL at 10:26

## 2021-01-01 RX ADMIN — HEPARIN SODIUM 5000 UNITS: 5000 INJECTION, SOLUTION INTRAVENOUS; SUBCUTANEOUS at 05:09

## 2021-01-01 RX ADMIN — ACETAMINOPHEN 1000 MG: 500 TABLET ORAL at 08:40

## 2021-01-01 RX ADMIN — METHIMAZOLE 5 MG: 5 TABLET ORAL at 17:28

## 2021-01-01 RX ADMIN — CALCITRIOL CAPSULES 0.25 MCG 0.25 MCG: 0.25 CAPSULE ORAL at 06:25

## 2021-01-01 RX ADMIN — METHIMAZOLE 5 MG: 5 TABLET ORAL at 18:17

## 2021-01-01 RX ADMIN — ACETAMINOPHEN 1000 MG: 500 TABLET ORAL at 17:00

## 2021-01-01 RX ADMIN — HEPARIN SODIUM 5000 UNITS: 5000 INJECTION, SOLUTION INTRAVENOUS; SUBCUTANEOUS at 13:48

## 2021-01-01 RX ADMIN — METHIMAZOLE 5 MG: 5 TABLET ORAL at 21:46

## 2021-01-01 RX ADMIN — DOCUSATE SODIUM 50 MG AND SENNOSIDES 8.6 MG 2 TABLET: 8.6; 5 TABLET, FILM COATED ORAL at 16:51

## 2021-01-01 RX ADMIN — APIXABAN 2.5 MG: 5 TABLET, FILM COATED ORAL at 08:11

## 2021-01-01 RX ADMIN — GABAPENTIN 300 MG: 300 CAPSULE ORAL at 18:00

## 2021-01-01 RX ADMIN — EPOETIN ALFA-EPBX 4000 UNITS: 4000 INJECTION, SOLUTION INTRAVENOUS; SUBCUTANEOUS at 08:40

## 2021-01-01 RX ADMIN — CLOPIDOGREL BISULFATE 75 MG: 75 TABLET ORAL at 06:25

## 2021-01-01 RX ADMIN — GABAPENTIN 300 MG: 300 CAPSULE ORAL at 18:03

## 2021-01-01 RX ADMIN — MIDODRINE HYDROCHLORIDE 10 MG: 5 TABLET ORAL at 16:15

## 2021-01-01 RX ADMIN — DOCUSATE SODIUM 50 MG AND SENNOSIDES 8.6 MG 2 TABLET: 8.6; 5 TABLET, FILM COATED ORAL at 17:18

## 2021-01-01 RX ADMIN — SEVELAMER CARBONATE 2400 MG: 800 TABLET, FILM COATED ORAL at 18:18

## 2021-01-01 RX ADMIN — TAMSULOSIN HYDROCHLORIDE 0.8 MG: 0.4 CAPSULE ORAL at 20:27

## 2021-01-01 RX ADMIN — PIPERACILLIN SODIUM AND TAZOBACTAM SODIUM 3.38 G: 3; .375 INJECTION, POWDER, LYOPHILIZED, FOR SOLUTION INTRAVENOUS at 20:10

## 2021-01-01 RX ADMIN — TAMSULOSIN HYDROCHLORIDE 0.8 MG: 0.4 CAPSULE ORAL at 22:19

## 2021-01-01 RX ADMIN — GABAPENTIN 300 MG: 300 CAPSULE ORAL at 06:25

## 2021-01-01 RX ADMIN — OMEPRAZOLE 20 MG: 20 CAPSULE, DELAYED RELEASE ORAL at 06:09

## 2021-01-01 RX ADMIN — MIDODRINE HYDROCHLORIDE 10 MG: 5 TABLET ORAL at 21:32

## 2021-01-01 RX ADMIN — APIXABAN 2.5 MG: 5 TABLET, FILM COATED ORAL at 21:45

## 2021-01-01 RX ADMIN — Medication 667 MG: at 17:24

## 2021-01-01 RX ADMIN — GABAPENTIN 300 MG: 300 CAPSULE ORAL at 18:16

## 2021-01-01 RX ADMIN — OMEPRAZOLE 20 MG: 20 CAPSULE, DELAYED RELEASE ORAL at 06:24

## 2021-01-01 RX ADMIN — APIXABAN 5 MG: 5 TABLET, FILM COATED ORAL at 17:49

## 2021-01-01 RX ADMIN — OXYCODONE 5 MG: 5 TABLET ORAL at 05:56

## 2021-01-01 RX ADMIN — PIPERACILLIN AND TAZOBACTAM 4.5 G: 4; .5 INJECTION, POWDER, LYOPHILIZED, FOR SOLUTION INTRAVENOUS; PARENTERAL at 05:35

## 2021-01-01 RX ADMIN — METOPROLOL TARTRATE 12.5 MG: 25 TABLET, FILM COATED ORAL at 08:21

## 2021-01-01 RX ADMIN — BUDESONIDE 0.5 MG: 0.5 SUSPENSION RESPIRATORY (INHALATION) at 20:18

## 2021-01-01 RX ADMIN — GUAIFENESIN 600 MG: 600 TABLET, EXTENDED RELEASE ORAL at 05:43

## 2021-01-01 RX ADMIN — ATORVASTATIN CALCIUM 40 MG: 40 TABLET, FILM COATED ORAL at 20:20

## 2021-01-01 RX ADMIN — ATORVASTATIN CALCIUM 40 MG: 40 TABLET, FILM COATED ORAL at 21:50

## 2021-01-01 RX ADMIN — HEPARIN SODIUM 3700 UNITS: 1000 INJECTION, SOLUTION INTRAVENOUS; SUBCUTANEOUS at 08:35

## 2021-01-01 RX ADMIN — FUROSEMIDE 40 MG: 10 INJECTION, SOLUTION INTRAVENOUS at 05:56

## 2021-01-01 RX ADMIN — OXYCODONE 5 MG: 5 TABLET ORAL at 11:44

## 2021-01-01 RX ADMIN — DOCUSATE SODIUM 50 MG AND SENNOSIDES 8.6 MG 2 TABLET: 8.6; 5 TABLET, FILM COATED ORAL at 06:30

## 2021-01-01 RX ADMIN — ACETAMINOPHEN 1000 MG: 500 TABLET ORAL at 14:35

## 2021-01-01 RX ADMIN — SODIUM CHLORIDE 125 MG: 9 INJECTION, SOLUTION INTRAVENOUS at 17:46

## 2021-01-01 RX ADMIN — MIDODRINE HYDROCHLORIDE 10 MG: 5 TABLET ORAL at 09:02

## 2021-01-01 RX ADMIN — CLOPIDOGREL BISULFATE 75 MG: 75 TABLET ORAL at 06:34

## 2021-01-01 RX ADMIN — MIDODRINE HYDROCHLORIDE 15 MG: 5 TABLET ORAL at 12:41

## 2021-01-01 RX ADMIN — OMEPRAZOLE 20 MG: 20 CAPSULE, DELAYED RELEASE ORAL at 08:59

## 2021-01-01 RX ADMIN — ASPIRIN 81 MG: 81 TABLET, COATED ORAL at 06:19

## 2021-01-01 RX ADMIN — GABAPENTIN 300 MG: 300 CAPSULE ORAL at 21:16

## 2021-01-01 RX ADMIN — SEVELAMER CARBONATE 1600 MG: 800 TABLET, FILM COATED ORAL at 14:27

## 2021-01-01 RX ADMIN — FUROSEMIDE 80 MG: 10 INJECTION, SOLUTION INTRAMUSCULAR; INTRAVENOUS at 05:06

## 2021-01-01 RX ADMIN — SEVELAMER CARBONATE 2400 MG: 800 TABLET, FILM COATED ORAL at 12:57

## 2021-01-01 RX ADMIN — SEVELAMER CARBONATE 1600 MG: 800 TABLET, FILM COATED ORAL at 11:27

## 2021-01-01 RX ADMIN — HEPARIN SODIUM 5000 UNITS: 5000 INJECTION, SOLUTION INTRAVENOUS; SUBCUTANEOUS at 22:03

## 2021-01-01 RX ADMIN — TAMSULOSIN HYDROCHLORIDE 0.8 MG: 0.4 CAPSULE ORAL at 20:22

## 2021-01-01 RX ADMIN — OXYCODONE HYDROCHLORIDE AND ACETAMINOPHEN 1 TABLET: 5; 325 TABLET ORAL at 02:00

## 2021-01-01 RX ADMIN — TAMSULOSIN HYDROCHLORIDE 0.8 MG: 0.4 CAPSULE ORAL at 21:09

## 2021-01-01 RX ADMIN — MIDODRINE HYDROCHLORIDE 10 MG: 5 TABLET ORAL at 09:41

## 2021-01-01 RX ADMIN — OMEPRAZOLE 20 MG: 20 CAPSULE, DELAYED RELEASE ORAL at 18:38

## 2021-01-01 RX ADMIN — ATORVASTATIN CALCIUM 40 MG: 40 TABLET, FILM COATED ORAL at 20:57

## 2021-01-01 RX ADMIN — ATORVASTATIN CALCIUM 40 MG: 40 TABLET, FILM COATED ORAL at 20:19

## 2021-01-01 RX ADMIN — ACETAMINOPHEN 1000 MG: 500 TABLET ORAL at 15:00

## 2021-01-01 RX ADMIN — CALCITRIOL CAPSULES 0.25 MCG 0.25 MCG: 0.25 CAPSULE ORAL at 08:34

## 2021-01-01 RX ADMIN — SEVELAMER CARBONATE 1600 MG: 800 TABLET, FILM COATED ORAL at 14:15

## 2021-01-01 RX ADMIN — METHIMAZOLE 5 MG: 5 TABLET ORAL at 18:01

## 2021-01-01 RX ADMIN — MIDODRINE HYDROCHLORIDE 10 MG: 5 TABLET ORAL at 10:08

## 2021-01-01 RX ADMIN — ASPIRIN 81 MG: 81 TABLET, COATED ORAL at 05:32

## 2021-01-01 RX ADMIN — MIDODRINE HYDROCHLORIDE 10 MG: 5 TABLET ORAL at 13:31

## 2021-01-01 RX ADMIN — GABAPENTIN 300 MG: 300 CAPSULE ORAL at 17:33

## 2021-01-01 RX ADMIN — TRAZODONE HYDROCHLORIDE 50 MG: 50 TABLET ORAL at 20:49

## 2021-01-01 RX ADMIN — PIPERACILLIN AND TAZOBACTAM 4.5 G: 4; .5 INJECTION, POWDER, LYOPHILIZED, FOR SOLUTION INTRAVENOUS; PARENTERAL at 18:00

## 2021-01-01 RX ADMIN — OXYCODONE 5 MG: 5 TABLET ORAL at 22:26

## 2021-01-01 RX ADMIN — MIDODRINE HYDROCHLORIDE 10 MG: 5 TABLET ORAL at 13:13

## 2021-01-01 RX ADMIN — ATORVASTATIN CALCIUM 40 MG: 40 TABLET, FILM COATED ORAL at 21:45

## 2021-01-01 RX ADMIN — METHIMAZOLE 5 MG: 5 TABLET ORAL at 05:29

## 2021-01-01 RX ADMIN — APIXABAN 2.5 MG: 5 TABLET, FILM COATED ORAL at 20:19

## 2021-01-01 RX ADMIN — GABAPENTIN 300 MG: 300 CAPSULE ORAL at 12:07

## 2021-01-01 RX ADMIN — SEVELAMER CARBONATE 1600 MG: 800 TABLET, FILM COATED ORAL at 18:38

## 2021-01-01 RX ADMIN — METHIMAZOLE 5 MG: 5 TABLET ORAL at 18:48

## 2021-01-01 RX ADMIN — HEPARIN SODIUM 1500 UNITS: 1000 INJECTION, SOLUTION INTRAVENOUS; SUBCUTANEOUS at 09:35

## 2021-01-01 RX ADMIN — TRAMADOL HYDROCHLORIDE 100 MG: 50 TABLET, FILM COATED ORAL at 12:59

## 2021-01-01 RX ADMIN — GABAPENTIN 300 MG: 300 CAPSULE ORAL at 12:47

## 2021-01-01 RX ADMIN — GABAPENTIN 300 MG: 300 CAPSULE ORAL at 05:55

## 2021-01-01 RX ADMIN — EPOETIN ALFA-EPBX 10000 UNITS: 10000 INJECTION, SOLUTION INTRAVENOUS; SUBCUTANEOUS at 14:37

## 2021-01-01 RX ADMIN — MORPHINE SULFATE 2 MG: 4 INJECTION INTRAVENOUS at 09:02

## 2021-01-01 RX ADMIN — GUAIFENESIN 600 MG: 600 TABLET, EXTENDED RELEASE ORAL at 05:25

## 2021-01-01 RX ADMIN — CALCITRIOL CAPSULES 0.25 MCG 0.25 MCG: 0.25 CAPSULE ORAL at 08:18

## 2021-01-01 RX ADMIN — ATORVASTATIN CALCIUM 40 MG: 40 TABLET, FILM COATED ORAL at 21:02

## 2021-01-01 RX ADMIN — AMPICILLIN SODIUM AND SULBACTAM SODIUM 3 G: 2; 1 INJECTION, POWDER, FOR SOLUTION INTRAMUSCULAR; INTRAVENOUS at 17:20

## 2021-01-01 RX ADMIN — OXYCODONE HYDROCHLORIDE AND ACETAMINOPHEN 1 TABLET: 5; 325 TABLET ORAL at 07:20

## 2021-01-01 RX ADMIN — OMEPRAZOLE 20 MG: 20 CAPSULE, DELAYED RELEASE ORAL at 17:58

## 2021-01-01 RX ADMIN — ACETAMINOPHEN 1000 MG: 500 TABLET ORAL at 17:13

## 2021-01-01 RX ADMIN — FUROSEMIDE 40 MG: 10 INJECTION, SOLUTION INTRAMUSCULAR; INTRAVENOUS at 05:55

## 2021-01-01 RX ADMIN — IPRATROPIUM BROMIDE AND ALBUTEROL SULFATE 3 ML: .5; 2.5 SOLUTION RESPIRATORY (INHALATION) at 07:27

## 2021-01-01 RX ADMIN — GUAIFENESIN 600 MG: 600 TABLET, EXTENDED RELEASE ORAL at 16:48

## 2021-01-01 RX ADMIN — OXYCODONE 5 MG: 5 TABLET ORAL at 21:59

## 2021-01-01 RX ADMIN — Medication 1334 MG: at 17:45

## 2021-01-01 RX ADMIN — APIXABAN 5 MG: 5 TABLET, FILM COATED ORAL at 06:10

## 2021-01-01 RX ADMIN — GUAIFENESIN 600 MG: 600 TABLET, EXTENDED RELEASE ORAL at 13:01

## 2021-01-01 RX ADMIN — ACETAMINOPHEN 650 MG: 325 TABLET, FILM COATED ORAL at 06:07

## 2021-01-01 RX ADMIN — TAMSULOSIN HYDROCHLORIDE 0.8 MG: 0.4 CAPSULE ORAL at 20:39

## 2021-01-01 RX ADMIN — HEPARIN SODIUM 5000 UNITS: 5000 INJECTION, SOLUTION INTRAVENOUS; SUBCUTANEOUS at 06:00

## 2021-01-01 RX ADMIN — METHIMAZOLE 5 MG: 5 TABLET ORAL at 18:18

## 2021-01-01 RX ADMIN — CARVEDILOL 3.12 MG: 3.12 TABLET, FILM COATED ORAL at 09:03

## 2021-01-01 RX ADMIN — OMEPRAZOLE 20 MG: 20 CAPSULE, DELAYED RELEASE ORAL at 08:19

## 2021-01-01 RX ADMIN — MIDODRINE HYDROCHLORIDE 10 MG: 5 TABLET ORAL at 08:42

## 2021-01-01 RX ADMIN — Medication 1334 MG: at 05:45

## 2021-01-01 RX ADMIN — AMPICILLIN SODIUM AND SULBACTAM SODIUM 3 G: 2; 1 INJECTION, POWDER, FOR SOLUTION INTRAMUSCULAR; INTRAVENOUS at 16:06

## 2021-01-01 RX ADMIN — MIDODRINE HYDROCHLORIDE 10 MG: 5 TABLET ORAL at 21:45

## 2021-01-01 RX ADMIN — GABAPENTIN 300 MG: 300 CAPSULE ORAL at 05:27

## 2021-01-01 RX ADMIN — SEVELAMER CARBONATE 1600 MG: 800 TABLET, FILM COATED ORAL at 08:27

## 2021-01-01 RX ADMIN — OMEPRAZOLE 20 MG: 20 CAPSULE, DELAYED RELEASE ORAL at 20:22

## 2021-01-01 RX ADMIN — CLOPIDOGREL BISULFATE 75 MG: 75 TABLET ORAL at 05:05

## 2021-01-01 RX ADMIN — METHIMAZOLE 5 MG: 5 TABLET ORAL at 19:45

## 2021-01-01 RX ADMIN — FUROSEMIDE 40 MG: 10 INJECTION, SOLUTION INTRAMUSCULAR; INTRAVENOUS at 05:47

## 2021-01-01 RX ADMIN — SEVELAMER CARBONATE 1600 MG: 800 TABLET, FILM COATED ORAL at 08:58

## 2021-01-01 RX ADMIN — OXYCODONE HYDROCHLORIDE 5 MG: 5 TABLET ORAL at 16:35

## 2021-01-01 RX ADMIN — HEPARIN SODIUM: 1000 INJECTION, SOLUTION INTRAVENOUS; SUBCUTANEOUS at 11:00

## 2021-01-01 RX ADMIN — TAMSULOSIN HYDROCHLORIDE 0.8 MG: 0.4 CAPSULE ORAL at 21:02

## 2021-01-01 RX ADMIN — PIPERACILLIN AND TAZOBACTAM 4.5 G: 4; .5 INJECTION, POWDER, LYOPHILIZED, FOR SOLUTION INTRAVENOUS; PARENTERAL at 06:29

## 2021-01-01 RX ADMIN — TAMSULOSIN HYDROCHLORIDE 0.8 MG: 0.4 CAPSULE ORAL at 20:41

## 2021-01-01 RX ADMIN — HEPARIN SODIUM 5000 UNITS: 5000 INJECTION, SOLUTION INTRAVENOUS; SUBCUTANEOUS at 13:33

## 2021-01-01 RX ADMIN — CALCITRIOL CAPSULES 0.25 MCG 0.25 MCG: 0.25 CAPSULE ORAL at 05:08

## 2021-01-01 RX ADMIN — TRAMADOL HYDROCHLORIDE 25 MG: 50 TABLET, FILM COATED ORAL at 17:32

## 2021-01-01 RX ADMIN — MIDODRINE HYDROCHLORIDE 10 MG: 5 TABLET ORAL at 20:41

## 2021-01-01 RX ADMIN — SODIUM CHLORIDE 250 MG: 9 INJECTION, SOLUTION INTRAVENOUS at 18:10

## 2021-01-01 RX ADMIN — CLOPIDOGREL BISULFATE 75 MG: 75 TABLET ORAL at 05:35

## 2021-01-01 RX ADMIN — ASPIRIN 81 MG: 81 TABLET, COATED ORAL at 06:01

## 2021-01-01 RX ADMIN — SEVELAMER CARBONATE 2400 MG: 800 TABLET, FILM COATED ORAL at 09:03

## 2021-01-01 RX ADMIN — METHIMAZOLE 7.5 MG: 5 TABLET ORAL at 08:16

## 2021-01-01 RX ADMIN — CALCITRIOL CAPSULES 0.25 MCG 0.25 MCG: 0.25 CAPSULE ORAL at 05:25

## 2021-01-01 RX ADMIN — METHIMAZOLE 5 MG: 5 TABLET ORAL at 05:33

## 2021-01-01 RX ADMIN — HEPARIN SODIUM 5000 UNITS: 5000 INJECTION, SOLUTION INTRAVENOUS; SUBCUTANEOUS at 06:25

## 2021-01-01 RX ADMIN — TRAMADOL HYDROCHLORIDE 25 MG: 50 TABLET, FILM COATED ORAL at 21:02

## 2021-01-01 RX ADMIN — CALCITRIOL CAPSULES 0.25 MCG 0.25 MCG: 0.25 CAPSULE ORAL at 08:05

## 2021-01-01 RX ADMIN — CALCITRIOL CAPSULES 0.25 MCG 0.25 MCG: 0.25 CAPSULE ORAL at 08:58

## 2021-01-01 RX ADMIN — CALCITRIOL CAPSULES 0.25 MCG 0.25 MCG: 0.25 CAPSULE ORAL at 07:55

## 2021-01-01 RX ADMIN — HEPARIN SODIUM 3700 UNITS: 1000 INJECTION, SOLUTION INTRAVENOUS; SUBCUTANEOUS at 11:57

## 2021-01-01 RX ADMIN — OMEPRAZOLE 20 MG: 20 CAPSULE, DELAYED RELEASE ORAL at 05:57

## 2021-01-01 RX ADMIN — DOCUSATE SODIUM 50 MG AND SENNOSIDES 8.6 MG 2 TABLET: 8.6; 5 TABLET, FILM COATED ORAL at 17:28

## 2021-01-01 RX ADMIN — MIDODRINE HYDROCHLORIDE 15 MG: 5 TABLET ORAL at 11:27

## 2021-01-01 RX ADMIN — MIDODRINE HYDROCHLORIDE 10 MG: 5 TABLET ORAL at 20:27

## 2021-01-01 RX ADMIN — SEVELAMER CARBONATE 1600 MG: 800 TABLET, FILM COATED ORAL at 12:41

## 2021-01-01 RX ADMIN — TRAMADOL HYDROCHLORIDE 50 MG: 50 TABLET, COATED ORAL at 22:16

## 2021-01-01 RX ADMIN — ANTACID TABLETS 1000 MG: 500 TABLET, CHEWABLE ORAL at 21:46

## 2021-01-01 RX ADMIN — INSULIN HUMAN 1 UNITS: 100 INJECTION, SOLUTION PARENTERAL at 11:36

## 2021-01-01 RX ADMIN — OXYCODONE HYDROCHLORIDE AND ACETAMINOPHEN 1 TABLET: 5; 325 TABLET ORAL at 21:59

## 2021-01-01 RX ADMIN — FENTANYL CITRATE 25 MCG: 50 INJECTION, SOLUTION INTRAMUSCULAR; INTRAVENOUS at 16:43

## 2021-01-01 RX ADMIN — METOPROLOL TARTRATE 37.5 MG: 25 TABLET, FILM COATED ORAL at 17:24

## 2021-01-01 RX ADMIN — LIDOCAINE HYDROCHLORIDE 1 TUBE: 20 JELLY TOPICAL at 23:05

## 2021-01-01 RX ADMIN — METOPROLOL TARTRATE 37.5 MG: 25 TABLET, FILM COATED ORAL at 18:39

## 2021-01-01 RX ADMIN — SEVELAMER CARBONATE 1600 MG: 800 TABLET, FILM COATED ORAL at 07:39

## 2021-01-01 RX ADMIN — TAMSULOSIN HYDROCHLORIDE 0.8 MG: 0.4 CAPSULE ORAL at 20:46

## 2021-01-01 RX ADMIN — METHIMAZOLE 5 MG: 5 TABLET ORAL at 18:39

## 2021-01-01 RX ADMIN — GUAIFENESIN 600 MG: 600 TABLET, EXTENDED RELEASE ORAL at 06:46

## 2021-01-01 RX ADMIN — SEVELAMER CARBONATE 1600 MG: 800 TABLET, FILM COATED ORAL at 17:49

## 2021-01-01 RX ADMIN — APIXABAN 5 MG: 5 TABLET, FILM COATED ORAL at 06:01

## 2021-01-01 RX ADMIN — HEPARIN SODIUM 5000 UNITS: 5000 INJECTION, SOLUTION INTRAVENOUS; SUBCUTANEOUS at 00:16

## 2021-01-01 RX ADMIN — TAMSULOSIN HYDROCHLORIDE 0.8 MG: 0.4 CAPSULE ORAL at 22:56

## 2021-01-01 RX ADMIN — BUDESONIDE 0.5 MG: 0.5 SUSPENSION RESPIRATORY (INHALATION) at 20:42

## 2021-01-01 RX ADMIN — TAMSULOSIN HYDROCHLORIDE 0.8 MG: 0.4 CAPSULE ORAL at 21:16

## 2021-01-01 RX ADMIN — MIDODRINE HYDROCHLORIDE 10 MG: 5 TABLET ORAL at 21:50

## 2021-01-01 RX ADMIN — METOPROLOL TARTRATE 37.5 MG: 25 TABLET, FILM COATED ORAL at 05:54

## 2021-01-01 RX ADMIN — OMEPRAZOLE 20 MG: 20 CAPSULE, DELAYED RELEASE ORAL at 08:39

## 2021-01-01 RX ADMIN — METHIMAZOLE 7.5 MG: 5 TABLET ORAL at 07:56

## 2021-01-01 RX ADMIN — MIDODRINE HYDROCHLORIDE 10 MG: 5 TABLET ORAL at 08:14

## 2021-01-01 RX ADMIN — OXYCODONE HYDROCHLORIDE 10 MG: 10 TABLET ORAL at 13:15

## 2021-01-01 RX ADMIN — SEVELAMER CARBONATE 2400 MG: 800 TABLET, FILM COATED ORAL at 18:01

## 2021-01-01 RX ADMIN — DIBASIC SODIUM PHOSPHATE, MONOBASIC POTASSIUM PHOSPHATE AND MONOBASIC SODIUM PHOSPHATE 250 MG: 852; 155; 130 TABLET ORAL at 20:05

## 2021-01-01 RX ADMIN — FUROSEMIDE 20 MG: 10 INJECTION, SOLUTION INTRAMUSCULAR; INTRAVENOUS at 13:11

## 2021-01-01 RX ADMIN — MIDODRINE HYDROCHLORIDE 15 MG: 5 TABLET ORAL at 12:51

## 2021-01-01 RX ADMIN — Medication 1334 MG: at 11:21

## 2021-01-01 RX ADMIN — HEPARIN SODIUM 5000 UNITS: 5000 INJECTION, SOLUTION INTRAVENOUS; SUBCUTANEOUS at 21:49

## 2021-01-01 RX ADMIN — MIDODRINE HYDROCHLORIDE 10 MG: 5 TABLET ORAL at 08:12

## 2021-01-01 RX ADMIN — SODIUM CHLORIDE 500 ML: 9 INJECTION, SOLUTION INTRAVENOUS at 08:03

## 2021-01-01 RX ADMIN — CARVEDILOL 3.12 MG: 3.12 TABLET, FILM COATED ORAL at 05:10

## 2021-01-01 RX ADMIN — ACETAMINOPHEN 1000 MG: 500 TABLET ORAL at 04:43

## 2021-01-01 RX ADMIN — ONDANSETRON 4 MG: 4 TABLET, ORALLY DISINTEGRATING ORAL at 06:35

## 2021-01-01 RX ADMIN — Medication 667 MG: at 11:31

## 2021-01-01 RX ADMIN — HEPARIN SODIUM 5000 UNITS: 5000 INJECTION, SOLUTION INTRAVENOUS; SUBCUTANEOUS at 14:59

## 2021-01-01 RX ADMIN — GUAIFENESIN 600 MG: 600 TABLET, EXTENDED RELEASE ORAL at 05:05

## 2021-01-01 RX ADMIN — APIXABAN 5 MG: 5 TABLET, FILM COATED ORAL at 18:07

## 2021-01-01 RX ADMIN — APIXABAN 2.5 MG: 5 TABLET, FILM COATED ORAL at 08:29

## 2021-01-01 RX ADMIN — HEPARIN SODIUM 1800 UNITS: 1000 INJECTION, SOLUTION INTRAVENOUS; SUBCUTANEOUS at 14:30

## 2021-01-01 RX ADMIN — SEVELAMER CARBONATE 2400 MG: 800 TABLET, FILM COATED ORAL at 17:33

## 2021-01-01 RX ADMIN — APIXABAN 5 MG: 5 TABLET, FILM COATED ORAL at 18:16

## 2021-01-01 RX ADMIN — MIDODRINE HYDROCHLORIDE 10 MG: 5 TABLET ORAL at 12:37

## 2021-01-01 RX ADMIN — FLUCONAZOLE 200 MG: 100 TABLET ORAL at 17:34

## 2021-01-01 RX ADMIN — MIDODRINE HYDROCHLORIDE 15 MG: 5 TABLET ORAL at 17:12

## 2021-01-01 RX ADMIN — OXYCODONE HYDROCHLORIDE 5 MG: 5 TABLET ORAL at 20:21

## 2021-01-01 RX ADMIN — SEVELAMER CARBONATE 1600 MG: 800 TABLET, FILM COATED ORAL at 17:44

## 2021-01-01 RX ADMIN — OXYCODONE HYDROCHLORIDE AND ACETAMINOPHEN 1 TABLET: 5; 325 TABLET ORAL at 19:37

## 2021-01-01 RX ADMIN — HEPARIN SODIUM 3700 UNITS: 1000 INJECTION, SOLUTION INTRAVENOUS; SUBCUTANEOUS at 10:33

## 2021-01-01 RX ADMIN — APIXABAN 5 MG: 5 TABLET, FILM COATED ORAL at 17:08

## 2021-01-01 RX ADMIN — GABAPENTIN 100 MG: 100 CAPSULE ORAL at 18:39

## 2021-01-01 RX ADMIN — BUDESONIDE 0.5 MG: 0.5 SUSPENSION RESPIRATORY (INHALATION) at 20:29

## 2021-01-01 RX ADMIN — ATORVASTATIN CALCIUM 40 MG: 40 TABLET, FILM COATED ORAL at 20:29

## 2021-01-01 RX ADMIN — ACETAMINOPHEN 1000 MG: 500 TABLET ORAL at 16:02

## 2021-01-01 RX ADMIN — SEVELAMER CARBONATE 2400 MG: 800 TABLET, FILM COATED ORAL at 18:39

## 2021-01-01 RX ADMIN — MORPHINE SULFATE 15 MG: 15 TABLET, FILM COATED, EXTENDED RELEASE ORAL at 05:25

## 2021-01-01 RX ADMIN — INSULIN GLARGINE 8 UNITS: 100 INJECTION, SOLUTION SUBCUTANEOUS at 06:39

## 2021-01-01 RX ADMIN — EPOETIN ALFA-EPBX 4000 UNITS: 4000 INJECTION, SOLUTION INTRAVENOUS; SUBCUTANEOUS at 11:08

## 2021-01-01 RX ADMIN — TRAMADOL HYDROCHLORIDE 50 MG: 50 TABLET, FILM COATED ORAL at 20:59

## 2021-01-01 RX ADMIN — METHIMAZOLE 5 MG: 5 TABLET ORAL at 17:57

## 2021-01-01 RX ADMIN — OMEPRAZOLE 20 MG: 20 CAPSULE, DELAYED RELEASE ORAL at 08:16

## 2021-01-01 RX ADMIN — MIDODRINE HYDROCHLORIDE 10 MG: 5 TABLET ORAL at 14:25

## 2021-01-01 RX ADMIN — METHIMAZOLE 5 MG: 5 TABLET ORAL at 17:11

## 2021-01-01 RX ADMIN — GABAPENTIN 300 MG: 300 CAPSULE ORAL at 06:30

## 2021-01-01 RX ADMIN — INSULIN LISPRO 3 UNITS: 100 INJECTION, SOLUTION INTRAVENOUS; SUBCUTANEOUS at 11:12

## 2021-01-01 RX ADMIN — INSULIN GLARGINE 8 UNITS: 100 INJECTION, SOLUTION SUBCUTANEOUS at 06:31

## 2021-01-01 RX ADMIN — INSULIN GLARGINE 10 UNITS: 100 INJECTION, SOLUTION SUBCUTANEOUS at 22:00

## 2021-01-01 RX ADMIN — MORPHINE SULFATE 2 MG: 4 INJECTION INTRAVENOUS at 09:56

## 2021-01-01 RX ADMIN — METHIMAZOLE 5 MG: 5 TABLET ORAL at 05:57

## 2021-01-01 RX ADMIN — GABAPENTIN 300 MG: 300 CAPSULE ORAL at 14:57

## 2021-01-01 RX ADMIN — MIDODRINE HYDROCHLORIDE 15 MG: 5 TABLET ORAL at 17:04

## 2021-01-01 RX ADMIN — GABAPENTIN 300 MG: 300 CAPSULE ORAL at 10:26

## 2021-01-01 RX ADMIN — DOCUSATE SODIUM 50 MG AND SENNOSIDES 8.6 MG 2 TABLET: 8.6; 5 TABLET, FILM COATED ORAL at 18:38

## 2021-01-01 RX ADMIN — GABAPENTIN 300 MG: 300 CAPSULE ORAL at 20:49

## 2021-01-01 RX ADMIN — APIXABAN 5 MG: 5 TABLET, FILM COATED ORAL at 06:34

## 2021-01-01 RX ADMIN — BUDESONIDE 0.5 MG: 0.5 SUSPENSION RESPIRATORY (INHALATION) at 18:55

## 2021-01-01 RX ADMIN — FUROSEMIDE 40 MG: 10 INJECTION, SOLUTION INTRAVENOUS at 05:19

## 2021-01-01 RX ADMIN — SEVELAMER CARBONATE 2400 MG: 800 TABLET, FILM COATED ORAL at 13:13

## 2021-01-01 RX ADMIN — INSULIN GLARGINE 5 UNITS: 100 INJECTION, SOLUTION SUBCUTANEOUS at 07:51

## 2021-01-01 RX ADMIN — ACETAMINOPHEN 1000 MG: 500 TABLET, FILM COATED ORAL at 08:28

## 2021-01-01 RX ADMIN — HEPARIN SODIUM 3700 UNITS: 1000 INJECTION, SOLUTION INTRAVENOUS; SUBCUTANEOUS at 14:04

## 2021-01-01 RX ADMIN — TRAMADOL HYDROCHLORIDE 50 MG: 50 TABLET, FILM COATED ORAL at 19:48

## 2021-01-01 RX ADMIN — OMEPRAZOLE 20 MG: 20 CAPSULE, DELAYED RELEASE ORAL at 17:13

## 2021-01-01 RX ADMIN — GUAIFENESIN 600 MG: 600 TABLET, EXTENDED RELEASE ORAL at 05:47

## 2021-01-01 RX ADMIN — PIPERACILLIN AND TAZOBACTAM 4.5 G: 4; .5 INJECTION, POWDER, LYOPHILIZED, FOR SOLUTION INTRAVENOUS; PARENTERAL at 06:00

## 2021-01-01 RX ADMIN — INSULIN LISPRO 2 UNITS: 100 INJECTION, SOLUTION INTRAVENOUS; SUBCUTANEOUS at 21:14

## 2021-01-01 RX ADMIN — Medication 1334 MG: at 05:25

## 2021-01-01 RX ADMIN — MIDODRINE HYDROCHLORIDE 10 MG: 5 TABLET ORAL at 18:35

## 2021-01-01 RX ADMIN — GABAPENTIN 300 MG: 300 CAPSULE ORAL at 05:37

## 2021-01-01 RX ADMIN — SEVELAMER CARBONATE 1600 MG: 800 TABLET, FILM COATED ORAL at 11:36

## 2021-01-01 RX ADMIN — FUROSEMIDE 20 MG: 10 INJECTION, SOLUTION INTRAMUSCULAR; INTRAVENOUS at 04:43

## 2021-01-01 RX ADMIN — MIDODRINE HYDROCHLORIDE 10 MG: 5 TABLET ORAL at 07:39

## 2021-01-01 RX ADMIN — GABAPENTIN 300 MG: 300 CAPSULE ORAL at 17:32

## 2021-01-01 RX ADMIN — METOPROLOL TARTRATE 37.5 MG: 25 TABLET, FILM COATED ORAL at 18:01

## 2021-01-01 RX ADMIN — CALCITRIOL CAPSULES 0.25 MCG 0.25 MCG: 0.25 CAPSULE ORAL at 05:38

## 2021-01-01 RX ADMIN — SEVELAMER CARBONATE 1600 MG: 800 TABLET, FILM COATED ORAL at 20:22

## 2021-01-01 RX ADMIN — DOCUSATE SODIUM 50 MG AND SENNOSIDES 8.6 MG 2 TABLET: 8.6; 5 TABLET, FILM COATED ORAL at 20:22

## 2021-01-01 RX ADMIN — EPOETIN ALFA-EPBX 10000 UNITS: 10000 INJECTION, SOLUTION INTRAVENOUS; SUBCUTANEOUS at 08:05

## 2021-01-01 RX ADMIN — Medication 1334 MG: at 12:34

## 2021-01-01 RX ADMIN — EPOETIN ALFA-EPBX 10000 UNITS: 10000 INJECTION, SOLUTION INTRAVENOUS; SUBCUTANEOUS at 15:23

## 2021-01-01 RX ADMIN — GUAIFENESIN 600 MG: 600 TABLET, EXTENDED RELEASE ORAL at 17:43

## 2021-01-01 RX ADMIN — ACETAMINOPHEN 1000 MG: 500 TABLET ORAL at 17:56

## 2021-01-01 RX ADMIN — MIDODRINE HYDROCHLORIDE 15 MG: 5 TABLET ORAL at 11:36

## 2021-01-01 RX ADMIN — APIXABAN 2.5 MG: 5 TABLET, FILM COATED ORAL at 20:00

## 2021-01-01 RX ADMIN — CLOPIDOGREL BISULFATE 75 MG: 75 TABLET ORAL at 05:25

## 2021-01-01 RX ADMIN — METHIMAZOLE 5 MG: 5 TABLET ORAL at 05:35

## 2021-01-01 RX ADMIN — ATORVASTATIN CALCIUM 40 MG: 40 TABLET, FILM COATED ORAL at 20:56

## 2021-01-01 RX ADMIN — ALUMINUM HYDROXIDE, MAGNESIUM HYDROXIDE, AND DIMETHICONE 20 ML: 400; 400; 40 SUSPENSION ORAL at 22:16

## 2021-01-01 RX ADMIN — GABAPENTIN 300 MG: 300 CAPSULE ORAL at 20:14

## 2021-01-01 RX ADMIN — INSULIN HUMAN 1 UNITS: 100 INJECTION, SOLUTION PARENTERAL at 21:08

## 2021-01-01 RX ADMIN — LIDOCAINE HYDROCHLORIDE: 20 INJECTION, SOLUTION INFILTRATION; PERINEURAL at 11:09

## 2021-01-01 RX ADMIN — HEPARIN SODIUM 5000 UNITS: 5000 INJECTION, SOLUTION INTRAVENOUS; SUBCUTANEOUS at 13:02

## 2021-01-01 RX ADMIN — HEPARIN SODIUM 3700 UNITS: 1000 INJECTION, SOLUTION INTRAVENOUS; SUBCUTANEOUS at 14:52

## 2021-01-01 RX ADMIN — TAMSULOSIN HYDROCHLORIDE 0.8 MG: 0.4 CAPSULE ORAL at 20:14

## 2021-01-01 RX ADMIN — SODIUM CHLORIDE 500 ML: 9 INJECTION, SOLUTION INTRAVENOUS at 15:03

## 2021-01-01 RX ADMIN — METHIMAZOLE 5 MG: 5 TABLET ORAL at 17:52

## 2021-01-01 RX ADMIN — ATORVASTATIN CALCIUM 40 MG: 40 TABLET, FILM COATED ORAL at 22:04

## 2021-01-01 RX ADMIN — TRAZODONE HYDROCHLORIDE 50 MG: 50 TABLET ORAL at 22:06

## 2021-01-01 RX ADMIN — GABAPENTIN 300 MG: 300 CAPSULE ORAL at 05:35

## 2021-01-01 RX ADMIN — MORPHINE SULFATE 15 MG: 15 TABLET, FILM COATED, EXTENDED RELEASE ORAL at 22:01

## 2021-01-01 RX ADMIN — ONDANSETRON 4 MG: 2 INJECTION INTRAMUSCULAR; INTRAVENOUS at 10:54

## 2021-01-01 RX ADMIN — ACETAMINOPHEN 1000 MG: 500 TABLET ORAL at 22:04

## 2021-01-01 RX ADMIN — METHIMAZOLE 5 MG: 5 TABLET ORAL at 18:02

## 2021-01-01 RX ADMIN — MIDODRINE HYDROCHLORIDE 15 MG: 5 TABLET ORAL at 16:35

## 2021-01-01 RX ADMIN — MIDODRINE HYDROCHLORIDE 10 MG: 5 TABLET ORAL at 19:37

## 2021-01-01 RX ADMIN — APIXABAN 5 MG: 5 TABLET, FILM COATED ORAL at 05:52

## 2021-01-01 RX ADMIN — MIDODRINE HYDROCHLORIDE 10 MG: 5 TABLET ORAL at 15:06

## 2021-01-01 RX ADMIN — OMEPRAZOLE 20 MG: 20 CAPSULE, DELAYED RELEASE ORAL at 08:09

## 2021-01-01 RX ADMIN — MEROPENEM 500 MG: 500 INJECTION, POWDER, FOR SOLUTION INTRAVENOUS at 05:25

## 2021-01-01 RX ADMIN — ATORVASTATIN CALCIUM 40 MG: 40 TABLET, FILM COATED ORAL at 20:22

## 2021-01-01 RX ADMIN — TAMSULOSIN HYDROCHLORIDE 0.8 MG: 0.4 CAPSULE ORAL at 20:29

## 2021-01-01 RX ADMIN — TRAZODONE HYDROCHLORIDE 50 MG: 50 TABLET ORAL at 20:28

## 2021-01-01 RX ADMIN — CALCITRIOL CAPSULES 0.25 MCG 0.25 MCG: 0.25 CAPSULE ORAL at 17:16

## 2021-01-01 RX ADMIN — SEVELAMER CARBONATE 2400 MG: 800 TABLET, FILM COATED ORAL at 13:50

## 2021-01-01 RX ADMIN — INSULIN LISPRO 1 UNITS: 100 INJECTION, SOLUTION INTRAVENOUS; SUBCUTANEOUS at 17:05

## 2021-01-01 RX ADMIN — DOCUSATE SODIUM 50 MG AND SENNOSIDES 8.6 MG 2 TABLET: 8.6; 5 TABLET, FILM COATED ORAL at 17:04

## 2021-01-01 RX ADMIN — HEPARIN SODIUM 3700 UNITS: 1000 INJECTION, SOLUTION INTRAVENOUS; SUBCUTANEOUS at 13:40

## 2021-01-01 RX ADMIN — SEVELAMER CARBONATE 1600 MG: 800 TABLET, FILM COATED ORAL at 18:12

## 2021-01-01 RX ADMIN — APIXABAN 5 MG: 5 TABLET, FILM COATED ORAL at 17:36

## 2021-01-01 RX ADMIN — OXYCODONE 5 MG: 5 TABLET ORAL at 05:25

## 2021-01-01 RX ADMIN — APIXABAN 5 MG: 5 TABLET, FILM COATED ORAL at 18:54

## 2021-01-01 RX ADMIN — ATORVASTATIN CALCIUM 40 MG: 40 TABLET, FILM COATED ORAL at 21:12

## 2021-01-01 RX ADMIN — SODIUM CHLORIDE: 9 INJECTION, SOLUTION INTRAVENOUS at 16:06

## 2021-01-01 RX ADMIN — METHIMAZOLE 5 MG: 5 TABLET ORAL at 06:30

## 2021-01-01 RX ADMIN — EPOETIN ALFA-EPBX 10000 UNITS: 10000 INJECTION, SOLUTION INTRAVENOUS; SUBCUTANEOUS at 09:12

## 2021-01-01 RX ADMIN — LACTULOSE 30 ML: 20 SOLUTION ORAL at 17:24

## 2021-01-01 RX ADMIN — INSULIN LISPRO 3 UNITS: 100 INJECTION, SOLUTION INTRAVENOUS; SUBCUTANEOUS at 08:01

## 2021-01-01 RX ADMIN — EPOETIN ALFA-EPBX 10000 UNITS: 10000 INJECTION, SOLUTION INTRAVENOUS; SUBCUTANEOUS at 16:00

## 2021-01-01 RX ADMIN — OXYCODONE 5 MG: 5 TABLET ORAL at 23:15

## 2021-01-01 RX ADMIN — SEVELAMER CARBONATE 2400 MG: 800 TABLET, FILM COATED ORAL at 09:28

## 2021-01-01 RX ADMIN — HEPARIN SODIUM 1800 UNITS: 1000 INJECTION, SOLUTION INTRAVENOUS; SUBCUTANEOUS at 16:04

## 2021-01-01 RX ADMIN — TAMSULOSIN HYDROCHLORIDE 0.8 MG: 0.4 CAPSULE ORAL at 20:49

## 2021-01-01 RX ADMIN — TRAMADOL HYDROCHLORIDE 25 MG: 50 TABLET, FILM COATED ORAL at 01:10

## 2021-01-01 RX ADMIN — INSULIN GLARGINE 10 UNITS: 100 INJECTION, SOLUTION SUBCUTANEOUS at 17:03

## 2021-01-01 RX ADMIN — CLOPIDOGREL BISULFATE 75 MG: 75 TABLET ORAL at 05:14

## 2021-01-01 RX ADMIN — OMEPRAZOLE 20 MG: 20 CAPSULE, DELAYED RELEASE ORAL at 18:16

## 2021-01-01 RX ADMIN — MIDODRINE HYDROCHLORIDE 15 MG: 5 TABLET ORAL at 17:36

## 2021-01-01 RX ADMIN — METHIMAZOLE 5 MG: 5 TABLET ORAL at 06:10

## 2021-01-01 RX ADMIN — CARVEDILOL 3.12 MG: 3.12 TABLET, FILM COATED ORAL at 18:07

## 2021-01-01 RX ADMIN — MIDODRINE HYDROCHLORIDE 15 MG: 5 TABLET ORAL at 12:23

## 2021-01-01 RX ADMIN — METHIMAZOLE 7.5 MG: 5 TABLET ORAL at 05:25

## 2021-01-01 RX ADMIN — ATORVASTATIN CALCIUM 40 MG: 40 TABLET, FILM COATED ORAL at 21:46

## 2021-01-01 RX ADMIN — METHIMAZOLE 7.5 MG: 5 TABLET ORAL at 08:18

## 2021-01-01 RX ADMIN — METHIMAZOLE 5 MG: 5 TABLET ORAL at 18:30

## 2021-01-01 RX ADMIN — INSULIN GLARGINE 5 UNITS: 100 INJECTION, SOLUTION SUBCUTANEOUS at 21:15

## 2021-01-01 RX ADMIN — GABAPENTIN 300 MG: 300 CAPSULE ORAL at 17:18

## 2021-01-01 RX ADMIN — MIDODRINE HYDROCHLORIDE 15 MG: 5 TABLET ORAL at 18:03

## 2021-01-01 RX ADMIN — TAMSULOSIN HYDROCHLORIDE 0.8 MG: 0.4 CAPSULE ORAL at 20:32

## 2021-01-01 RX ADMIN — OMEPRAZOLE 20 MG: 20 CAPSULE, DELAYED RELEASE ORAL at 08:05

## 2021-01-01 RX ADMIN — ACETAMINOPHEN 1000 MG: 500 TABLET ORAL at 10:24

## 2021-01-01 RX ADMIN — MIDODRINE HYDROCHLORIDE 15 MG: 5 TABLET ORAL at 17:57

## 2021-01-01 RX ADMIN — TAMSULOSIN HYDROCHLORIDE 0.8 MG: 0.4 CAPSULE ORAL at 20:28

## 2021-01-01 RX ADMIN — GUAIFENESIN 600 MG: 600 TABLET, EXTENDED RELEASE ORAL at 06:02

## 2021-01-01 RX ADMIN — GABAPENTIN 300 MG: 300 CAPSULE ORAL at 21:02

## 2021-01-01 RX ADMIN — GABAPENTIN 300 MG: 300 CAPSULE ORAL at 17:44

## 2021-01-01 RX ADMIN — TAMSULOSIN HYDROCHLORIDE 0.8 MG: 0.4 CAPSULE ORAL at 19:17

## 2021-01-01 RX ADMIN — ONDANSETRON 4 MG: 4 TABLET, ORALLY DISINTEGRATING ORAL at 04:59

## 2021-01-01 RX ADMIN — FENTANYL CITRATE 25 MCG: 50 INJECTION, SOLUTION INTRAMUSCULAR; INTRAVENOUS at 12:38

## 2021-01-01 RX ADMIN — MIDODRINE HYDROCHLORIDE 15 MG: 5 TABLET ORAL at 18:16

## 2021-01-01 RX ADMIN — ATORVASTATIN CALCIUM 40 MG: 40 TABLET, FILM COATED ORAL at 17:55

## 2021-01-01 RX ADMIN — TRAMADOL HYDROCHLORIDE 50 MG: 50 TABLET, FILM COATED ORAL at 11:27

## 2021-01-01 RX ADMIN — TRAMADOL HYDROCHLORIDE 50 MG: 50 TABLET ORAL at 11:20

## 2021-01-01 RX ADMIN — TRAMADOL HYDROCHLORIDE 50 MG: 50 TABLET, FILM COATED ORAL at 14:59

## 2021-01-01 RX ADMIN — SEVELAMER CARBONATE 1600 MG: 800 TABLET, FILM COATED ORAL at 12:23

## 2021-01-01 RX ADMIN — EPOETIN ALFA-EPBX 10000 UNITS: 10000 INJECTION, SOLUTION INTRAVENOUS; SUBCUTANEOUS at 17:18

## 2021-01-01 RX ADMIN — GABAPENTIN 300 MG: 300 CAPSULE ORAL at 04:07

## 2021-01-01 RX ADMIN — INSULIN LISPRO 3 UNITS: 100 INJECTION, SOLUTION INTRAVENOUS; SUBCUTANEOUS at 18:29

## 2021-01-01 RX ADMIN — MICONAZOLE NITRATE: 20 CREAM TOPICAL at 07:58

## 2021-01-01 RX ADMIN — GABAPENTIN 300 MG: 300 CAPSULE ORAL at 07:55

## 2021-01-01 RX ADMIN — METHIMAZOLE 5 MG: 5 TABLET ORAL at 20:37

## 2021-01-01 RX ADMIN — METHIMAZOLE 7.5 MG: 5 TABLET ORAL at 04:11

## 2021-01-01 RX ADMIN — SEVELAMER CARBONATE 800 MG: 800 TABLET, FILM COATED ORAL at 08:14

## 2021-01-01 RX ADMIN — HEPARIN SODIUM 5000 UNITS: 5000 INJECTION, SOLUTION INTRAVENOUS; SUBCUTANEOUS at 06:45

## 2021-01-01 RX ADMIN — TRAZODONE HYDROCHLORIDE 50 MG: 50 TABLET ORAL at 22:18

## 2021-01-01 RX ADMIN — ATORVASTATIN CALCIUM 40 MG: 40 TABLET, FILM COATED ORAL at 20:51

## 2021-01-01 RX ADMIN — APIXABAN 2.5 MG: 5 TABLET, FILM COATED ORAL at 21:34

## 2021-01-01 RX ADMIN — OMEPRAZOLE 20 MG: 20 CAPSULE, DELAYED RELEASE ORAL at 17:05

## 2021-01-01 RX ADMIN — FUROSEMIDE 20 MG: 10 INJECTION, SOLUTION INTRAMUSCULAR; INTRAVENOUS at 20:40

## 2021-01-01 RX ADMIN — GUAIFENESIN 600 MG: 600 TABLET, EXTENDED RELEASE ORAL at 00:43

## 2021-01-01 RX ADMIN — MIDODRINE HYDROCHLORIDE 10 MG: 5 TABLET ORAL at 18:07

## 2021-01-01 RX ADMIN — GUAIFENESIN 600 MG: 600 TABLET, EXTENDED RELEASE ORAL at 17:18

## 2021-01-01 RX ADMIN — OXYCODONE HYDROCHLORIDE AND ACETAMINOPHEN 1 TABLET: 5; 325 TABLET ORAL at 19:36

## 2021-01-01 RX ADMIN — TRAZODONE HYDROCHLORIDE 50 MG: 50 TABLET ORAL at 21:52

## 2021-01-01 RX ADMIN — IPRATROPIUM BROMIDE AND ALBUTEROL SULFATE 3 ML: .5; 2.5 SOLUTION RESPIRATORY (INHALATION) at 00:55

## 2021-01-01 RX ADMIN — CEFAZOLIN 2 G: 330 INJECTION, POWDER, FOR SOLUTION INTRAMUSCULAR; INTRAVENOUS at 16:21

## 2021-01-01 RX ADMIN — SEVELAMER CARBONATE 1600 MG: 800 TABLET, FILM COATED ORAL at 12:13

## 2021-01-01 RX ADMIN — OMEPRAZOLE 20 MG: 20 CAPSULE, DELAYED RELEASE ORAL at 08:18

## 2021-01-01 RX ADMIN — METHIMAZOLE 5 MG: 5 TABLET ORAL at 04:54

## 2021-01-01 RX ADMIN — APIXABAN 5 MG: 5 TABLET, FILM COATED ORAL at 05:05

## 2021-01-01 RX ADMIN — HYDROXYZINE HYDROCHLORIDE 50 MG: 25 TABLET, FILM COATED ORAL at 19:59

## 2021-01-01 RX ADMIN — METHIMAZOLE 5 MG: 5 TABLET ORAL at 17:47

## 2021-01-01 RX ADMIN — INSULIN HUMAN 1 UNITS: 100 INJECTION, SOLUTION PARENTERAL at 08:34

## 2021-01-01 RX ADMIN — HEPARIN SODIUM 5000 UNITS: 5000 INJECTION, SOLUTION INTRAVENOUS; SUBCUTANEOUS at 21:08

## 2021-01-01 RX ADMIN — TRAMADOL HYDROCHLORIDE 25 MG: 50 TABLET, FILM COATED ORAL at 20:58

## 2021-01-01 RX ADMIN — HEPARIN SODIUM 5000 UNITS: 5000 INJECTION, SOLUTION INTRAVENOUS; SUBCUTANEOUS at 05:27

## 2021-01-01 RX ADMIN — CLOPIDOGREL BISULFATE 75 MG: 75 TABLET ORAL at 04:07

## 2021-01-01 RX ADMIN — METHIMAZOLE 5 MG: 5 TABLET ORAL at 17:14

## 2021-01-01 RX ADMIN — TAMSULOSIN HYDROCHLORIDE 0.4 MG: 0.4 CAPSULE ORAL at 21:12

## 2021-01-01 RX ADMIN — APIXABAN 5 MG: 5 TABLET, FILM COATED ORAL at 05:25

## 2021-01-01 RX ADMIN — MIDODRINE HYDROCHLORIDE 15 MG: 5 TABLET ORAL at 12:46

## 2021-01-01 RX ADMIN — APIXABAN 5 MG: 5 TABLET, FILM COATED ORAL at 18:03

## 2021-01-01 RX ADMIN — METHIMAZOLE 7.5 MG: 5 TABLET ORAL at 06:06

## 2021-01-01 RX ADMIN — SEVELAMER CARBONATE 2400 MG: 800 TABLET, FILM COATED ORAL at 08:08

## 2021-01-01 RX ADMIN — FLUCONAZOLE 200 MG: 100 TABLET ORAL at 08:12

## 2021-01-01 RX ADMIN — DOCUSATE SODIUM 50 MG AND SENNOSIDES 8.6 MG 2 TABLET: 8.6; 5 TABLET, FILM COATED ORAL at 04:18

## 2021-01-01 RX ADMIN — SODIUM CHLORIDE 500 ML: 9 INJECTION, SOLUTION INTRAVENOUS at 18:59

## 2021-01-01 RX ADMIN — APIXABAN 5 MG: 5 TABLET, FILM COATED ORAL at 05:59

## 2021-01-01 RX ADMIN — CALCITRIOL CAPSULES 0.25 MCG 0.25 MCG: 0.25 CAPSULE ORAL at 05:29

## 2021-01-01 RX ADMIN — GUAIFENESIN 600 MG: 600 TABLET, EXTENDED RELEASE ORAL at 22:18

## 2021-01-01 RX ADMIN — GABAPENTIN 300 MG: 300 CAPSULE ORAL at 12:05

## 2021-01-01 RX ADMIN — HEPARIN SODIUM 5000 UNITS: 5000 INJECTION, SOLUTION INTRAVENOUS; SUBCUTANEOUS at 21:32

## 2021-01-01 RX ADMIN — APIXABAN 5 MG: 5 TABLET, FILM COATED ORAL at 05:55

## 2021-01-01 RX ADMIN — OMEPRAZOLE 20 MG: 20 CAPSULE, DELAYED RELEASE ORAL at 17:17

## 2021-01-01 RX ADMIN — INSULIN LISPRO 1 UNITS: 100 INJECTION, SOLUTION INTRAVENOUS; SUBCUTANEOUS at 17:08

## 2021-01-01 RX ADMIN — APIXABAN 2.5 MG: 5 TABLET, FILM COATED ORAL at 07:56

## 2021-01-01 RX ADMIN — GABAPENTIN 300 MG: 300 CAPSULE ORAL at 08:12

## 2021-01-01 RX ADMIN — SODIUM CHLORIDE 250 ML: 9 INJECTION, SOLUTION INTRAVENOUS at 23:45

## 2021-01-01 RX ADMIN — OMEPRAZOLE 20 MG: 20 CAPSULE, DELAYED RELEASE ORAL at 17:12

## 2021-01-01 RX ADMIN — TRAZODONE HYDROCHLORIDE 50 MG: 50 TABLET ORAL at 19:48

## 2021-01-01 RX ADMIN — GABAPENTIN 300 MG: 300 CAPSULE ORAL at 06:09

## 2021-01-01 RX ADMIN — HEPARIN SODIUM 5000 UNITS: 5000 INJECTION, SOLUTION INTRAVENOUS; SUBCUTANEOUS at 05:40

## 2021-01-01 RX ADMIN — METHIMAZOLE 5 MG: 5 TABLET ORAL at 18:31

## 2021-01-01 RX ADMIN — OXYCODONE 5 MG: 5 TABLET ORAL at 06:54

## 2021-01-01 RX ADMIN — Medication 1334 MG: at 08:08

## 2021-01-01 RX ADMIN — OMEPRAZOLE 20 MG: 20 CAPSULE, DELAYED RELEASE ORAL at 05:37

## 2021-01-01 RX ADMIN — MIDODRINE HYDROCHLORIDE 10 MG: 5 TABLET ORAL at 08:46

## 2021-01-01 RX ADMIN — MORPHINE SULFATE 15 MG: 15 TABLET, FILM COATED, EXTENDED RELEASE ORAL at 11:20

## 2021-01-01 RX ADMIN — MORPHINE SULFATE 15 MG: 15 TABLET, FILM COATED, EXTENDED RELEASE ORAL at 05:45

## 2021-01-01 RX ADMIN — TRAZODONE HYDROCHLORIDE 50 MG: 50 TABLET ORAL at 21:32

## 2021-01-01 RX ADMIN — SEVELAMER CARBONATE 1600 MG: 800 TABLET, FILM COATED ORAL at 08:19

## 2021-01-01 RX ADMIN — METHIMAZOLE 5 MG: 5 TABLET ORAL at 06:21

## 2021-01-01 RX ADMIN — SEVELAMER CARBONATE 1600 MG: 800 TABLET, FILM COATED ORAL at 07:30

## 2021-01-01 RX ADMIN — GUAIFENESIN 600 MG: 600 TABLET, EXTENDED RELEASE ORAL at 23:27

## 2021-01-01 RX ADMIN — SODIUM CHLORIDE: 9 INJECTION, SOLUTION INTRAVENOUS at 10:40

## 2021-01-01 RX ADMIN — GABAPENTIN 300 MG: 300 CAPSULE ORAL at 04:17

## 2021-01-01 RX ADMIN — Medication 16 G: at 17:49

## 2021-01-01 RX ADMIN — HEPARIN SODIUM 1500 UNITS: 1000 INJECTION, SOLUTION INTRAVENOUS; SUBCUTANEOUS at 09:45

## 2021-01-01 RX ADMIN — MIDODRINE HYDROCHLORIDE 10 MG: 5 TABLET ORAL at 13:41

## 2021-01-01 RX ADMIN — CARVEDILOL 3.12 MG: 3.12 TABLET, FILM COATED ORAL at 07:39

## 2021-01-01 RX ADMIN — BUDESONIDE 0.5 MG: 0.5 SUSPENSION RESPIRATORY (INHALATION) at 07:48

## 2021-01-01 RX ADMIN — DEXTROSE MONOHYDRATE 50 ML: 25 INJECTION, SOLUTION INTRAVENOUS at 17:17

## 2021-01-01 RX ADMIN — APIXABAN 2.5 MG: 5 TABLET, FILM COATED ORAL at 20:17

## 2021-01-01 RX ADMIN — ATORVASTATIN CALCIUM 40 MG: 40 TABLET, FILM COATED ORAL at 17:53

## 2021-01-01 RX ADMIN — METHIMAZOLE 7.5 MG: 5 TABLET ORAL at 05:46

## 2021-01-01 RX ADMIN — SEVELAMER CARBONATE 2400 MG: 800 TABLET, FILM COATED ORAL at 17:11

## 2021-01-01 RX ADMIN — SODIUM CHLORIDE 250 MG: 9 INJECTION, SOLUTION INTRAVENOUS at 18:02

## 2021-01-01 RX ADMIN — ATORVASTATIN CALCIUM 40 MG: 40 TABLET, FILM COATED ORAL at 20:30

## 2021-01-01 RX ADMIN — APIXABAN 2.5 MG: 5 TABLET, FILM COATED ORAL at 08:46

## 2021-01-01 RX ADMIN — MIDODRINE HYDROCHLORIDE 15 MG: 5 TABLET ORAL at 13:07

## 2021-01-01 RX ADMIN — HEPARIN SODIUM 2000 UNITS: 1000 INJECTION, SOLUTION INTRAVENOUS; SUBCUTANEOUS at 09:46

## 2021-01-01 RX ADMIN — OMEPRAZOLE 20 MG: 20 CAPSULE, DELAYED RELEASE ORAL at 05:52

## 2021-01-01 RX ADMIN — OMEPRAZOLE 20 MG: 20 CAPSULE, DELAYED RELEASE ORAL at 21:52

## 2021-01-01 RX ADMIN — DOXYCYCLINE 100 MG: 100 TABLET, FILM COATED ORAL at 20:22

## 2021-01-01 RX ADMIN — EPOETIN ALFA-EPBX 10000 UNITS: 10000 INJECTION, SOLUTION INTRAVENOUS; SUBCUTANEOUS at 16:34

## 2021-01-01 RX ADMIN — SEVELAMER CARBONATE 2400 MG: 800 TABLET, FILM COATED ORAL at 08:22

## 2021-01-01 RX ADMIN — HEPARIN SODIUM 1500 UNITS: 1000 INJECTION, SOLUTION INTRAVENOUS; SUBCUTANEOUS at 10:30

## 2021-01-01 RX ADMIN — ATORVASTATIN CALCIUM 40 MG: 40 TABLET, FILM COATED ORAL at 20:00

## 2021-01-01 RX ADMIN — SODIUM CHLORIDE 1 L: 9 INJECTION, SOLUTION INTRAVENOUS at 09:43

## 2021-01-01 RX ADMIN — ATORVASTATIN CALCIUM 40 MG: 40 TABLET, FILM COATED ORAL at 20:47

## 2021-01-01 RX ADMIN — TRAMADOL HYDROCHLORIDE 50 MG: 50 TABLET, COATED ORAL at 20:00

## 2021-01-01 RX ADMIN — METHIMAZOLE 5 MG: 5 TABLET ORAL at 17:08

## 2021-01-01 RX ADMIN — METHIMAZOLE 7.5 MG: 5 TABLET ORAL at 04:12

## 2021-01-01 RX ADMIN — SEVELAMER CARBONATE 1600 MG: 800 TABLET, FILM COATED ORAL at 12:46

## 2021-01-01 RX ADMIN — MIDODRINE HYDROCHLORIDE 10 MG: 5 TABLET ORAL at 08:07

## 2021-01-01 RX ADMIN — ACETAMINOPHEN 650 MG: 325 TABLET, FILM COATED ORAL at 21:26

## 2021-01-01 RX ADMIN — GABAPENTIN 300 MG: 300 CAPSULE ORAL at 12:51

## 2021-01-01 RX ADMIN — MIDODRINE HYDROCHLORIDE 10 MG: 5 TABLET ORAL at 20:29

## 2021-01-01 RX ADMIN — MIDODRINE HYDROCHLORIDE 10 MG: 5 TABLET ORAL at 12:01

## 2021-01-01 RX ADMIN — HYDROXYZINE HYDROCHLORIDE 25 MG: 25 TABLET, FILM COATED ORAL at 21:07

## 2021-01-01 RX ADMIN — OMEPRAZOLE 20 MG: 20 CAPSULE, DELAYED RELEASE ORAL at 08:45

## 2021-01-01 RX ADMIN — TAMSULOSIN HYDROCHLORIDE 0.8 MG: 0.4 CAPSULE ORAL at 19:48

## 2021-01-01 RX ADMIN — DOCUSATE SODIUM 50 MG AND SENNOSIDES 8.6 MG 2 TABLET: 8.6; 5 TABLET, FILM COATED ORAL at 17:19

## 2021-01-01 RX ADMIN — TAMSULOSIN HYDROCHLORIDE 0.8 MG: 0.4 CAPSULE ORAL at 21:32

## 2021-01-01 RX ADMIN — CALCITRIOL CAPSULES 0.25 MCG 0.25 MCG: 0.25 CAPSULE ORAL at 04:42

## 2021-01-01 RX ADMIN — HEPARIN SODIUM 1500 UNITS: 1000 INJECTION, SOLUTION INTRAVENOUS; SUBCUTANEOUS at 06:15

## 2021-01-01 RX ADMIN — TAMSULOSIN HYDROCHLORIDE 0.8 MG: 0.4 CAPSULE ORAL at 20:33

## 2021-01-01 RX ADMIN — MIDODRINE HYDROCHLORIDE 10 MG: 5 TABLET ORAL at 14:36

## 2021-01-01 RX ADMIN — INSULIN GLARGINE 5 UNITS: 100 INJECTION, SOLUTION SUBCUTANEOUS at 20:56

## 2021-01-01 RX ADMIN — FLUCONAZOLE 200 MG: 100 TABLET ORAL at 08:16

## 2021-01-01 RX ADMIN — TAMSULOSIN HYDROCHLORIDE 0.8 MG: 0.4 CAPSULE ORAL at 21:48

## 2021-01-01 RX ADMIN — EPOETIN ALFA-EPBX 6000 UNITS: 3000 INJECTION, SOLUTION INTRAVENOUS; SUBCUTANEOUS at 10:44

## 2021-01-01 RX ADMIN — OXYCODONE HYDROCHLORIDE AND ACETAMINOPHEN 1 TABLET: 5; 325 TABLET ORAL at 15:27

## 2021-01-01 RX ADMIN — FENTANYL CITRATE 100 MCG: 50 INJECTION, SOLUTION INTRAMUSCULAR; INTRAVENOUS at 10:47

## 2021-01-01 RX ADMIN — SEVELAMER CARBONATE 2400 MG: 800 TABLET, FILM COATED ORAL at 05:47

## 2021-01-01 RX ADMIN — GABAPENTIN 300 MG: 300 CAPSULE ORAL at 08:18

## 2021-01-01 RX ADMIN — METOPROLOL TARTRATE 37.5 MG: 25 TABLET, FILM COATED ORAL at 17:00

## 2021-01-01 RX ADMIN — EPOETIN ALFA-EPBX 10000 UNITS: 10000 INJECTION, SOLUTION INTRAVENOUS; SUBCUTANEOUS at 08:15

## 2021-01-01 RX ADMIN — BISACODYL 10 MG: 10 SUPPOSITORY RECTAL at 21:41

## 2021-01-01 RX ADMIN — OMEPRAZOLE 20 MG: 20 CAPSULE, DELAYED RELEASE ORAL at 17:32

## 2021-01-01 RX ADMIN — TRAMADOL HYDROCHLORIDE 50 MG: 50 TABLET ORAL at 01:36

## 2021-01-01 RX ADMIN — GABAPENTIN 300 MG: 300 CAPSULE ORAL at 17:15

## 2021-01-01 RX ADMIN — TRAMADOL HYDROCHLORIDE 50 MG: 50 TABLET ORAL at 18:09

## 2021-01-01 RX ADMIN — ACETAMINOPHEN 650 MG: 325 TABLET, FILM COATED ORAL at 20:21

## 2021-01-01 RX ADMIN — MORPHINE SULFATE 15 MG: 15 TABLET, FILM COATED, EXTENDED RELEASE ORAL at 06:02

## 2021-01-01 RX ADMIN — MIDODRINE HYDROCHLORIDE 10 MG: 5 TABLET ORAL at 17:46

## 2021-01-01 RX ADMIN — AMPICILLIN SODIUM AND SULBACTAM SODIUM 3 G: 2; 1 INJECTION, POWDER, FOR SOLUTION INTRAMUSCULAR; INTRAVENOUS at 19:10

## 2021-01-01 RX ADMIN — SEVELAMER CARBONATE 2400 MG: 800 TABLET, FILM COATED ORAL at 13:20

## 2021-01-01 RX ADMIN — OMEPRAZOLE 20 MG: 20 CAPSULE, DELAYED RELEASE ORAL at 04:07

## 2021-01-01 RX ADMIN — GABAPENTIN 300 MG: 300 CAPSULE ORAL at 20:07

## 2021-01-01 RX ADMIN — SEVELAMER CARBONATE 1600 MG: 800 TABLET, FILM COATED ORAL at 18:02

## 2021-01-01 RX ADMIN — METHIMAZOLE 7.5 MG: 5 TABLET ORAL at 06:03

## 2021-01-01 RX ADMIN — METHIMAZOLE 5 MG: 5 TABLET ORAL at 04:07

## 2021-01-01 RX ADMIN — ACETAMINOPHEN 1000 MG: 500 TABLET ORAL at 14:14

## 2021-01-01 RX ADMIN — TAMSULOSIN HYDROCHLORIDE 0.8 MG: 0.4 CAPSULE ORAL at 21:33

## 2021-01-01 RX ADMIN — APIXABAN 5 MG: 5 TABLET, FILM COATED ORAL at 17:12

## 2021-01-01 RX ADMIN — HEPARIN SODIUM 5000 UNITS: 5000 INJECTION, SOLUTION INTRAVENOUS; SUBCUTANEOUS at 12:59

## 2021-01-01 RX ADMIN — FUROSEMIDE 80 MG: 10 INJECTION, SOLUTION INTRAMUSCULAR; INTRAVENOUS at 11:55

## 2021-01-01 RX ADMIN — METHIMAZOLE 5 MG: 5 TABLET ORAL at 05:28

## 2021-01-01 RX ADMIN — CLOPIDOGREL BISULFATE 75 MG: 75 TABLET ORAL at 05:09

## 2021-01-01 RX ADMIN — HEPARIN SODIUM 5000 UNITS: 5000 INJECTION, SOLUTION INTRAVENOUS; SUBCUTANEOUS at 16:34

## 2021-01-01 RX ADMIN — GUAIFENESIN 600 MG: 600 TABLET, EXTENDED RELEASE ORAL at 13:26

## 2021-01-01 RX ADMIN — MIDODRINE HYDROCHLORIDE 10 MG: 5 TABLET ORAL at 08:11

## 2021-01-01 RX ADMIN — TRAMADOL HYDROCHLORIDE 50 MG: 50 TABLET, FILM COATED ORAL at 19:23

## 2021-01-01 RX ADMIN — MIDODRINE HYDROCHLORIDE 10 MG: 5 TABLET ORAL at 00:39

## 2021-01-01 RX ADMIN — HEPARIN SODIUM 2000 UNITS: 1000 INJECTION, SOLUTION INTRAVENOUS; SUBCUTANEOUS at 14:51

## 2021-01-01 RX ADMIN — GUAIFENESIN 600 MG: 600 TABLET, EXTENDED RELEASE ORAL at 12:44

## 2021-01-01 RX ADMIN — OXYCODONE 5 MG: 5 TABLET ORAL at 09:25

## 2021-01-01 RX ADMIN — OXYCODONE 5 MG: 5 TABLET ORAL at 20:55

## 2021-01-01 RX ADMIN — GABAPENTIN 300 MG: 300 CAPSULE ORAL at 17:10

## 2021-01-01 RX ADMIN — ACETAMINOPHEN 650 MG: 325 TABLET, FILM COATED ORAL at 08:34

## 2021-01-01 RX ADMIN — TRAZODONE HYDROCHLORIDE 50 MG: 50 TABLET ORAL at 22:56

## 2021-01-01 RX ADMIN — METHIMAZOLE 5 MG: 5 TABLET ORAL at 16:53

## 2021-01-01 RX ADMIN — MIDODRINE HYDROCHLORIDE 15 MG: 5 TABLET ORAL at 08:28

## 2021-01-01 RX ADMIN — CALCITRIOL CAPSULES 0.25 MCG 0.25 MCG: 0.25 CAPSULE ORAL at 18:48

## 2021-01-01 RX ADMIN — OXYCODONE HYDROCHLORIDE 5 MG: 5 TABLET ORAL at 04:56

## 2021-01-01 RX ADMIN — BUDESONIDE 0.5 MG: 0.5 SUSPENSION RESPIRATORY (INHALATION) at 08:02

## 2021-01-01 RX ADMIN — METHIMAZOLE 5 MG: 5 TABLET ORAL at 05:02

## 2021-01-01 RX ADMIN — METHIMAZOLE 5 MG: 5 TABLET ORAL at 21:16

## 2021-01-01 RX ADMIN — ATORVASTATIN CALCIUM 40 MG: 40 TABLET, FILM COATED ORAL at 22:56

## 2021-01-01 RX ADMIN — ACETAMINOPHEN 650 MG: 325 TABLET, FILM COATED ORAL at 10:17

## 2021-01-01 RX ADMIN — APIXABAN 5 MG: 5 TABLET, FILM COATED ORAL at 18:08

## 2021-01-01 RX ADMIN — CARVEDILOL 3.12 MG: 3.12 TABLET, FILM COATED ORAL at 05:20

## 2021-01-01 RX ADMIN — ATORVASTATIN CALCIUM 40 MG: 40 TABLET, FILM COATED ORAL at 20:21

## 2021-01-01 RX ADMIN — SEVELAMER CARBONATE 2400 MG: 800 TABLET, FILM COATED ORAL at 10:07

## 2021-01-01 RX ADMIN — SEVELAMER CARBONATE 2400 MG: 800 TABLET, FILM COATED ORAL at 17:46

## 2021-01-01 RX ADMIN — METHIMAZOLE 5 MG: 5 TABLET ORAL at 05:30

## 2021-01-01 RX ADMIN — TAMSULOSIN HYDROCHLORIDE 0.8 MG: 0.4 CAPSULE ORAL at 20:18

## 2021-01-01 RX ADMIN — INSULIN GLARGINE 8 UNITS: 100 INJECTION, SOLUTION SUBCUTANEOUS at 07:58

## 2021-01-01 RX ADMIN — INSULIN HUMAN 2 UNITS: 100 INJECTION, SOLUTION PARENTERAL at 20:18

## 2021-01-01 RX ADMIN — CALCITRIOL CAPSULES 0.25 MCG 0.25 MCG: 0.25 CAPSULE ORAL at 05:35

## 2021-01-01 RX ADMIN — METHIMAZOLE 7.5 MG: 5 TABLET ORAL at 06:28

## 2021-01-01 RX ADMIN — ATORVASTATIN CALCIUM 40 MG: 40 TABLET, FILM COATED ORAL at 20:18

## 2021-01-01 RX ADMIN — ATORVASTATIN CALCIUM 40 MG: 40 TABLET, FILM COATED ORAL at 17:15

## 2021-01-01 RX ADMIN — GABAPENTIN 300 MG: 300 CAPSULE ORAL at 06:34

## 2021-01-01 RX ADMIN — ATORVASTATIN CALCIUM 40 MG: 40 TABLET, FILM COATED ORAL at 20:17

## 2021-01-01 RX ADMIN — MIDODRINE HYDROCHLORIDE 10 MG: 5 TABLET ORAL at 11:13

## 2021-01-01 RX ADMIN — METHIMAZOLE 5 MG: 5 TABLET ORAL at 05:52

## 2021-01-01 RX ADMIN — MICONAZOLE NITRATE: 20 CREAM TOPICAL at 08:32

## 2021-01-01 RX ADMIN — CLOPIDOGREL BISULFATE 75 MG: 75 TABLET ORAL at 04:18

## 2021-01-01 RX ADMIN — DOXYCYCLINE 100 MG: 100 TABLET, FILM COATED ORAL at 05:55

## 2021-01-01 RX ADMIN — ATORVASTATIN CALCIUM 40 MG: 40 TABLET, FILM COATED ORAL at 21:39

## 2021-01-01 RX ADMIN — APIXABAN 5 MG: 5 TABLET, FILM COATED ORAL at 17:05

## 2021-01-01 RX ADMIN — METHIMAZOLE 5 MG: 5 TABLET ORAL at 20:17

## 2021-01-01 RX ADMIN — GUAIFENESIN 600 MG: 600 TABLET, EXTENDED RELEASE ORAL at 17:08

## 2021-01-01 RX ADMIN — ACETAMINOPHEN 1000 MG: 500 TABLET ORAL at 22:55

## 2021-01-01 RX ADMIN — TRAMADOL HYDROCHLORIDE 50 MG: 50 TABLET, COATED ORAL at 05:41

## 2021-01-01 RX ADMIN — SEVELAMER CARBONATE 1600 MG: 800 TABLET, FILM COATED ORAL at 08:34

## 2021-01-01 RX ADMIN — ACETAMINOPHEN 650 MG: 325 TABLET, FILM COATED ORAL at 13:49

## 2021-01-01 RX ADMIN — INSULIN GLARGINE 5 UNITS: 100 INJECTION, SOLUTION SUBCUTANEOUS at 20:37

## 2021-01-01 RX ADMIN — FUROSEMIDE 80 MG: 10 INJECTION, SOLUTION INTRAMUSCULAR; INTRAVENOUS at 15:27

## 2021-01-01 RX ADMIN — APIXABAN 2.5 MG: 5 TABLET, FILM COATED ORAL at 20:14

## 2021-01-01 RX ADMIN — TAMSULOSIN HYDROCHLORIDE 0.8 MG: 0.4 CAPSULE ORAL at 21:46

## 2021-01-01 RX ADMIN — OMEPRAZOLE 20 MG: 20 CAPSULE, DELAYED RELEASE ORAL at 05:28

## 2021-01-01 RX ADMIN — DEXTROSE MONOHYDRATE 50 ML: 25 INJECTION, SOLUTION INTRAVENOUS at 23:21

## 2021-01-01 RX ADMIN — TRAMADOL HYDROCHLORIDE 50 MG: 50 TABLET ORAL at 19:43

## 2021-01-01 RX ADMIN — CLOPIDOGREL BISULFATE 75 MG: 75 TABLET ORAL at 06:30

## 2021-01-01 RX ADMIN — METHIMAZOLE 5 MG: 5 TABLET ORAL at 17:46

## 2021-01-01 RX ADMIN — APIXABAN 5 MG: 5 TABLET, FILM COATED ORAL at 04:54

## 2021-01-01 RX ADMIN — MEROPENEM 500 MG: 500 INJECTION, POWDER, FOR SOLUTION INTRAVENOUS at 11:59

## 2021-01-01 RX ADMIN — DOCUSATE SODIUM 50 MG AND SENNOSIDES 8.6 MG 2 TABLET: 8.6; 5 TABLET, FILM COATED ORAL at 17:12

## 2021-01-01 RX ADMIN — BUDESONIDE AND FORMOTEROL FUMARATE DIHYDRATE 2 PUFF: 160; 4.5 AEROSOL RESPIRATORY (INHALATION) at 07:33

## 2021-01-01 RX ADMIN — MIDODRINE HYDROCHLORIDE 10 MG: 5 TABLET ORAL at 07:16

## 2021-01-01 RX ADMIN — CLOPIDOGREL BISULFATE 75 MG: 75 TABLET ORAL at 05:48

## 2021-01-01 RX ADMIN — CEFTRIAXONE SODIUM 2 G: 10 INJECTION, POWDER, FOR SOLUTION INTRAVENOUS at 20:26

## 2021-01-01 RX ADMIN — NOREPINEPHRINE BITARTRATE 3 MCG/MIN: 1 INJECTION, SOLUTION, CONCENTRATE INTRAVENOUS at 19:32

## 2021-01-01 RX ADMIN — ACETAMINOPHEN 1000 MG: 500 TABLET ORAL at 08:11

## 2021-01-01 RX ADMIN — HEPARIN SODIUM 2000 UNITS: 1000 INJECTION, SOLUTION INTRAVENOUS; SUBCUTANEOUS at 10:30

## 2021-01-01 RX ADMIN — EPOETIN ALFA-EPBX 10000 UNITS: 10000 INJECTION, SOLUTION INTRAVENOUS; SUBCUTANEOUS at 10:25

## 2021-01-01 RX ADMIN — GABAPENTIN 300 MG: 300 CAPSULE ORAL at 17:34

## 2021-01-01 RX ADMIN — MORPHINE SULFATE 15 MG: 15 TABLET, FILM COATED, EXTENDED RELEASE ORAL at 18:02

## 2021-01-01 RX ADMIN — METHIMAZOLE 5 MG: 5 TABLET ORAL at 05:51

## 2021-01-01 RX ADMIN — ONDANSETRON 4 MG: 4 TABLET, ORALLY DISINTEGRATING ORAL at 09:31

## 2021-01-01 RX ADMIN — METOPROLOL TARTRATE 25 MG: 25 TABLET, FILM COATED ORAL at 05:26

## 2021-01-01 RX ADMIN — FUROSEMIDE 40 MG: 10 INJECTION, SOLUTION INTRAMUSCULAR; INTRAVENOUS at 05:48

## 2021-01-01 RX ADMIN — ACETAMINOPHEN 1000 MG: 500 TABLET ORAL at 08:01

## 2021-01-01 RX ADMIN — GUAIFENESIN 600 MG: 600 TABLET, EXTENDED RELEASE ORAL at 18:39

## 2021-01-01 RX ADMIN — EPOETIN ALFA-EPBX 4000 UNITS: 4000 INJECTION, SOLUTION INTRAVENOUS; SUBCUTANEOUS at 12:57

## 2021-01-01 RX ADMIN — METHIMAZOLE 7.5 MG: 5 TABLET ORAL at 08:32

## 2021-01-01 RX ADMIN — METHIMAZOLE 7.5 MG: 5 TABLET ORAL at 08:33

## 2021-01-01 RX ADMIN — HEPARIN SODIUM 5000 UNITS: 5000 INJECTION, SOLUTION INTRAVENOUS; SUBCUTANEOUS at 21:04

## 2021-01-01 RX ADMIN — INSULIN LISPRO 3 UNITS: 100 INJECTION, SOLUTION INTRAVENOUS; SUBCUTANEOUS at 08:23

## 2021-01-01 RX ADMIN — MIDODRINE HYDROCHLORIDE 15 MG: 5 TABLET ORAL at 08:56

## 2021-01-01 RX ADMIN — GABAPENTIN 300 MG: 300 CAPSULE ORAL at 08:31

## 2021-01-01 RX ADMIN — OXYCODONE HYDROCHLORIDE AND ACETAMINOPHEN 1 TABLET: 5; 325 TABLET ORAL at 11:07

## 2021-01-01 RX ADMIN — TRAMADOL HYDROCHLORIDE 50 MG: 50 TABLET ORAL at 03:38

## 2021-01-01 RX ADMIN — GUAIFENESIN 600 MG: 600 TABLET, EXTENDED RELEASE ORAL at 18:05

## 2021-01-01 RX ADMIN — MIDODRINE HYDROCHLORIDE 10 MG: 5 TABLET ORAL at 18:12

## 2021-01-01 RX ADMIN — DOCUSATE SODIUM 50 MG AND SENNOSIDES 8.6 MG 2 TABLET: 8.6; 5 TABLET, FILM COATED ORAL at 05:59

## 2021-01-01 RX ADMIN — BUDESONIDE 0.5 MG: 0.5 SUSPENSION RESPIRATORY (INHALATION) at 09:45

## 2021-01-01 RX ADMIN — TRAZODONE HYDROCHLORIDE 50 MG: 50 TABLET ORAL at 21:39

## 2021-01-01 RX ADMIN — GABAPENTIN 300 MG: 300 CAPSULE ORAL at 05:30

## 2021-01-01 RX ADMIN — OXYCODONE 5 MG: 5 TABLET ORAL at 14:09

## 2021-01-01 RX ADMIN — GUAIFENESIN 600 MG: 600 TABLET, EXTENDED RELEASE ORAL at 07:56

## 2021-01-01 RX ADMIN — METHIMAZOLE 5 MG: 5 TABLET ORAL at 05:43

## 2021-01-01 RX ADMIN — FUROSEMIDE 80 MG: 10 INJECTION, SOLUTION INTRAMUSCULAR; INTRAVENOUS at 01:39

## 2021-01-01 RX ADMIN — PIPERACILLIN AND TAZOBACTAM 4.5 G: 4; .5 INJECTION, POWDER, LYOPHILIZED, FOR SOLUTION INTRAVENOUS; PARENTERAL at 17:17

## 2021-01-01 RX ADMIN — SEVELAMER CARBONATE 1600 MG: 800 TABLET, FILM COATED ORAL at 12:51

## 2021-01-01 RX ADMIN — CALCITRIOL CAPSULES 0.25 MCG 0.25 MCG: 0.25 CAPSULE ORAL at 05:03

## 2021-01-01 RX ADMIN — ATORVASTATIN CALCIUM 40 MG: 40 TABLET, FILM COATED ORAL at 20:07

## 2021-01-01 RX ADMIN — HEPARIN SODIUM 5000 UNITS: 5000 INJECTION, SOLUTION INTRAVENOUS; SUBCUTANEOUS at 22:26

## 2021-01-01 RX ADMIN — SEVELAMER CARBONATE 1600 MG: 800 TABLET, FILM COATED ORAL at 17:05

## 2021-01-01 RX ADMIN — GABAPENTIN 300 MG: 300 CAPSULE ORAL at 06:00

## 2021-01-01 RX ADMIN — APIXABAN 2.5 MG: 5 TABLET, FILM COATED ORAL at 08:16

## 2021-01-01 RX ADMIN — DOCUSATE SODIUM 50 MG AND SENNOSIDES 8.6 MG 2 TABLET: 8.6; 5 TABLET, FILM COATED ORAL at 05:13

## 2021-01-01 RX ADMIN — METHIMAZOLE 5 MG: 5 TABLET ORAL at 20:19

## 2021-01-01 RX ADMIN — METHIMAZOLE 7.5 MG: 5 TABLET ORAL at 08:58

## 2021-01-01 RX ADMIN — MIDODRINE HYDROCHLORIDE 15 MG: 5 TABLET ORAL at 09:43

## 2021-01-01 RX ADMIN — ACETAMINOPHEN 650 MG: 325 TABLET, FILM COATED ORAL at 05:54

## 2021-01-01 RX ADMIN — SEVELAMER CARBONATE 1600 MG: 800 TABLET, FILM COATED ORAL at 13:07

## 2021-01-01 RX ADMIN — OMEPRAZOLE 20 MG: 20 CAPSULE, DELAYED RELEASE ORAL at 05:30

## 2021-01-01 RX ADMIN — INSULIN GLARGINE 8 UNITS: 100 INJECTION, SOLUTION SUBCUTANEOUS at 06:22

## 2021-01-01 ASSESSMENT — ENCOUNTER SYMPTOMS
COUGH: 0
SPEECH CHANGE: 0
WEAKNESS: 1
DIZZINESS: 0
SPEECH CHANGE: 0
PALPITATIONS: 0
HEARTBURN: 0
BRUISES/BLEEDS EASILY: 0
ABDOMINAL PAIN: 0
MYALGIAS: 0
SHORTNESS OF BREATH: 1
MUSCULOSKELETAL NEGATIVE: 1
NERVOUS/ANXIOUS: 0
NAUSEA: 0
CONSTIPATION: 0
BRUISES/BLEEDS EASILY: 0
SHORTNESS OF BREATH: 0
SHORTNESS OF BREATH: 0
CHILLS: 0
HEARTBURN: 0
PSYCHIATRIC NEGATIVE: 1
SEIZURES: 0
EYES NEGATIVE: 1
POLYDIPSIA: 0
VOMITING: 0
FEVER: 0
HEADACHES: 0
NAUSEA: 0
COUGH: 1
FEVER: 0
EYE DISCHARGE: 0
FEVER: 0
ABDOMINAL PAIN: 0
PHOTOPHOBIA: 0
FLANK PAIN: 0
COUGH: 0
ABDOMINAL PAIN: 0
SPUTUM PRODUCTION: 0
VOMITING: 0
NEUROLOGICAL NEGATIVE: 1
DIAPHORESIS: 0
NEUROLOGICAL NEGATIVE: 1
SENSORY CHANGE: 0
FEVER: 0
TINGLING: 1
NERVOUS/ANXIOUS: 1
BRUISES/BLEEDS EASILY: 0
FOCAL WEAKNESS: 0
VOMITING: 0
GASTROINTESTINAL NEGATIVE: 1
DIZZINESS: 0
HEADACHES: 0
NAUSEA: 0
GASTROINTESTINAL NEGATIVE: 1
COUGH: 1
ORTHOPNEA: 0
DIARRHEA: 0
CONSTITUTIONAL NEGATIVE: 1
PALPITATIONS: 0
CONSTIPATION: 0
SHORTNESS OF BREATH: 0
SHORTNESS OF BREATH: 0
WHEEZING: 0
DIARRHEA: 0
SPEECH CHANGE: 0
COUGH: 0
NERVOUS/ANXIOUS: 0
PALPITATIONS: 0
VOMITING: 0
MYALGIAS: 0
ABDOMINAL PAIN: 0
BRUISES/BLEEDS EASILY: 0
RESPIRATORY NEGATIVE: 1
NEUROLOGICAL NEGATIVE: 1
DIZZINESS: 1
ABDOMINAL PAIN: 0
NAUSEA: 0
ABDOMINAL PAIN: 0
DOUBLE VISION: 0
CHILLS: 0
FALLS: 0
STRIDOR: 0
LOSS OF CONSCIOUSNESS: 0
DEPRESSION: 0
VOMITING: 0
MYALGIAS: 0
BLURRED VISION: 0
DIAPHORESIS: 0
SHORTNESS OF BREATH: 0
SENSORY CHANGE: 0
PALPITATIONS: 0
WHEEZING: 0
CONSTITUTIONAL NEGATIVE: 1
PALPITATIONS: 0
ABDOMINAL PAIN: 0
VOMITING: 0
PSYCHIATRIC NEGATIVE: 1
DIAPHORESIS: 0
DIARRHEA: 0
SHORTNESS OF BREATH: 1
WEAKNESS: 0
NAUSEA: 0
HEADACHES: 0
SHORTNESS OF BREATH: 0
ABDOMINAL PAIN: 0
BLURRED VISION: 0
CONSTIPATION: 0
FEVER: 0
HEADACHES: 0
VOMITING: 0
BACK PAIN: 0
SORE THROAT: 0
GASTROINTESTINAL NEGATIVE: 1
WEAKNESS: 0
SHORTNESS OF BREATH: 0
FEVER: 0
NERVOUS/ANXIOUS: 0
VOMITING: 0
MYALGIAS: 0
FEVER: 0
BRUISES/BLEEDS EASILY: 0
VOMITING: 0
NERVOUS/ANXIOUS: 0
PALPITATIONS: 0
FEVER: 0
NECK PAIN: 0
PHOTOPHOBIA: 0
BACK PAIN: 0
HEADACHES: 0
PALPITATIONS: 0
SHORTNESS OF BREATH: 1
VOMITING: 0
SORE THROAT: 0
ABDOMINAL PAIN: 0
LOSS OF CONSCIOUSNESS: 0
HEADACHES: 0
HEMOPTYSIS: 0
PALPITATIONS: 0
COUGH: 0
CONSTIPATION: 0
CHILLS: 0
HEMOPTYSIS: 0
MUSCULOSKELETAL NEGATIVE: 1
SHORTNESS OF BREATH: 0
TINGLING: 0
MYALGIAS: 0
RESPIRATORY NEGATIVE: 1
INSOMNIA: 0
MYALGIAS: 0
FEVER: 0
FOCAL WEAKNESS: 0
FEVER: 0
PHOTOPHOBIA: 0
COUGH: 1
COUGH: 0
FALLS: 0
CHILLS: 0
SENSORY CHANGE: 0
INSOMNIA: 0
PALPITATIONS: 0
RESPIRATORY NEGATIVE: 1
RESPIRATORY NEGATIVE: 1
HEARTBURN: 0
WHEEZING: 0
NERVOUS/ANXIOUS: 0
FALLS: 0
HEMOPTYSIS: 0
CHILLS: 0
ABDOMINAL PAIN: 0
SORE THROAT: 0
NAUSEA: 0
BACK PAIN: 0
NAUSEA: 0
CARDIOVASCULAR NEGATIVE: 1
DIZZINESS: 0
DIARRHEA: 0
EYES NEGATIVE: 1
NAUSEA: 0
CHILLS: 0
COUGH: 0
ABDOMINAL PAIN: 0
BLURRED VISION: 0
WEAKNESS: 0
GASTROINTESTINAL NEGATIVE: 1
COUGH: 0
SHORTNESS OF BREATH: 0
WHEEZING: 0
DEPRESSION: 0
POLYDIPSIA: 0
LOSS OF CONSCIOUSNESS: 0
FLANK PAIN: 0
SORE THROAT: 0
BRUISES/BLEEDS EASILY: 0
WHEEZING: 0
ORTHOPNEA: 0
FEVER: 0
FEVER: 0
NERVOUS/ANXIOUS: 1
DIARRHEA: 1
FEVER: 0
NAUSEA: 0
PALPITATIONS: 0
FEVER: 0
FLANK PAIN: 0
COUGH: 0
NERVOUS/ANXIOUS: 0
COUGH: 1
COUGH: 1
SPUTUM PRODUCTION: 1
BACK PAIN: 0
MYALGIAS: 0
GASTROINTESTINAL NEGATIVE: 1
CHILLS: 0
BACK PAIN: 0
DEPRESSION: 0
MYALGIAS: 0
CONSTITUTIONAL NEGATIVE: 1
PSYCHIATRIC NEGATIVE: 1
PALPITATIONS: 0
WEAKNESS: 0
DIARRHEA: 1
COUGH: 0
DIARRHEA: 0
SPUTUM PRODUCTION: 0
COUGH: 0
PSYCHIATRIC NEGATIVE: 1
DOUBLE VISION: 0
EYES NEGATIVE: 1
PALPITATIONS: 0
NAUSEA: 0
COUGH: 0
HEADACHES: 0
MYALGIAS: 0
DOUBLE VISION: 0
MYALGIAS: 0
DIAPHORESIS: 0
POLYDIPSIA: 0
PND: 0
VOMITING: 0
HEADACHES: 0
NECK PAIN: 0
HEADACHES: 0
WHEEZING: 0
SHORTNESS OF BREATH: 0
WHEEZING: 0
EYES NEGATIVE: 1
SHORTNESS OF BREATH: 1
SHORTNESS OF BREATH: 0
NAUSEA: 0
CONSTITUTIONAL NEGATIVE: 1
COUGH: 0
VOMITING: 0
SORE THROAT: 0
GASTROINTESTINAL NEGATIVE: 1
DIZZINESS: 0
SHORTNESS OF BREATH: 0
MYALGIAS: 0
BLURRED VISION: 0
HEADACHES: 0
EYE PAIN: 0
NEUROLOGICAL NEGATIVE: 1
VOMITING: 0
VOMITING: 0
NERVOUS/ANXIOUS: 0
BLURRED VISION: 0
DIAPHORESIS: 0
SPEECH CHANGE: 0
SORE THROAT: 0
GASTROINTESTINAL NEGATIVE: 1
FEVER: 0
VOMITING: 0
ABDOMINAL PAIN: 0
SHORTNESS OF BREATH: 0
HEARTBURN: 0
CHILLS: 0
WHEEZING: 0
FOCAL WEAKNESS: 0
WHEEZING: 0
DIZZINESS: 0
NAUSEA: 0
BLURRED VISION: 0
BLURRED VISION: 0
CLAUDICATION: 0
NAUSEA: 0
CARDIOVASCULAR NEGATIVE: 1
SHORTNESS OF BREATH: 1
SENSORY CHANGE: 0
FEVER: 0
VOMITING: 0
SHORTNESS OF BREATH: 0
NECK PAIN: 0
SEIZURES: 0
BACK PAIN: 0
DIARRHEA: 0
CHILLS: 0
ABDOMINAL PAIN: 0
VOMITING: 0
BRUISES/BLEEDS EASILY: 1
LOSS OF CONSCIOUSNESS: 0
DEPRESSION: 0
PALPITATIONS: 0
SHORTNESS OF BREATH: 0
HEMOPTYSIS: 0
SPEECH CHANGE: 0
HEARTBURN: 0
WEAKNESS: 0
DIARRHEA: 0
COUGH: 0
NAUSEA: 0
DIZZINESS: 0
HEADACHES: 0
PALPITATIONS: 0
SPUTUM PRODUCTION: 0
NERVOUS/ANXIOUS: 0
NAUSEA: 0
CHILLS: 0
DIZZINESS: 1
FEVER: 0
CARDIOVASCULAR NEGATIVE: 1
PHOTOPHOBIA: 0
HEADACHES: 0
HEADACHES: 0
DIARRHEA: 0
FEVER: 0
FEVER: 0
SENSORY CHANGE: 0
EYES NEGATIVE: 1
COUGH: 1
HEADACHES: 0
MYALGIAS: 0
COUGH: 0
SHORTNESS OF BREATH: 0
NAUSEA: 0
GASTROINTESTINAL NEGATIVE: 1
NAUSEA: 0
MYALGIAS: 0
CHILLS: 0
CHILLS: 0
MYALGIAS: 0
HEADACHES: 0
DIAPHORESIS: 0
FLANK PAIN: 0
SPEECH CHANGE: 0
BACK PAIN: 0
DOUBLE VISION: 0
DOUBLE VISION: 0
CARDIOVASCULAR NEGATIVE: 1
WHEEZING: 0
WHEEZING: 0
FALLS: 0
CARDIOVASCULAR NEGATIVE: 1
NAUSEA: 0
COUGH: 1
NAUSEA: 0
TINGLING: 1
PHOTOPHOBIA: 0
SPEECH CHANGE: 0
NEUROLOGICAL NEGATIVE: 1
EYES NEGATIVE: 1
NERVOUS/ANXIOUS: 0
HEMOPTYSIS: 0
BLURRED VISION: 0
COUGH: 1
PALPITATIONS: 0
LOSS OF CONSCIOUSNESS: 0
HEMOPTYSIS: 0
COUGH: 0
SHORTNESS OF BREATH: 0
BLURRED VISION: 0
FOCAL WEAKNESS: 0
VOMITING: 0
PALPITATIONS: 0
CHILLS: 0
VOMITING: 0
CHILLS: 0
VOMITING: 0
COUGH: 0
CARDIOVASCULAR NEGATIVE: 1
NAUSEA: 0
FLANK PAIN: 0
NERVOUS/ANXIOUS: 0
DIZZINESS: 0
CONSTIPATION: 0
CHILLS: 0
WHEEZING: 0
NAUSEA: 0
WEIGHT LOSS: 0
CHILLS: 0
SENSORY CHANGE: 1
PALPITATIONS: 0
NERVOUS/ANXIOUS: 1
ABDOMINAL PAIN: 0
BACK PAIN: 0
COUGH: 0
TINGLING: 1
TINGLING: 1
WEAKNESS: 1
PALPITATIONS: 0
DIZZINESS: 0
VOMITING: 0
HEADACHES: 0
EYES NEGATIVE: 1
COUGH: 0
WHEEZING: 0
EYES NEGATIVE: 1
FEVER: 0
RESPIRATORY NEGATIVE: 1
VOMITING: 0
NERVOUS/ANXIOUS: 1
SEIZURES: 0
PSYCHIATRIC NEGATIVE: 1
NAUSEA: 0
CHILLS: 0
NAUSEA: 0
CHILLS: 0
SHORTNESS OF BREATH: 0
ABDOMINAL PAIN: 0
DIZZINESS: 0
NAUSEA: 0
DIZZINESS: 0
NAUSEA: 0
MYALGIAS: 0
HEMOPTYSIS: 0
WEAKNESS: 0
BLURRED VISION: 0
FEVER: 0
NAUSEA: 0
SHORTNESS OF BREATH: 0
NEUROLOGICAL NEGATIVE: 1
DIARRHEA: 0
BLURRED VISION: 0
CHILLS: 0
BACK PAIN: 0
HEADACHES: 0
DIZZINESS: 0
DEPRESSION: 0
DIARRHEA: 1
CHILLS: 0
SENSORY CHANGE: 0
CONSTITUTIONAL NEGATIVE: 1
FEVER: 0
TINGLING: 0
ABDOMINAL PAIN: 0
COUGH: 0
SHORTNESS OF BREATH: 1
NAUSEA: 0
CONSTIPATION: 0
HEADACHES: 0
ORTHOPNEA: 0
ABDOMINAL PAIN: 0
FEVER: 0
CHILLS: 0
SHORTNESS OF BREATH: 0
FEVER: 0
SEIZURES: 0
FEVER: 0
HEMOPTYSIS: 0
NERVOUS/ANXIOUS: 0
FEVER: 0
NAUSEA: 0
FALLS: 0
LOSS OF CONSCIOUSNESS: 0
WHEEZING: 0
NAUSEA: 0
SHORTNESS OF BREATH: 0
SENSORY CHANGE: 0
BACK PAIN: 0
FOCAL WEAKNESS: 0
SPUTUM PRODUCTION: 0
CONSTIPATION: 0
EYES NEGATIVE: 1
DIZZINESS: 1
ORTHOPNEA: 0
HEADACHES: 0
DIZZINESS: 0
DIARRHEA: 0
PALPITATIONS: 0
SPEECH CHANGE: 0
WEAKNESS: 1
SEIZURES: 0
SEIZURES: 0
COUGH: 1
CHILLS: 0
CHILLS: 0
HEADACHES: 0
VOMITING: 0
FEVER: 0
CHILLS: 0
DOUBLE VISION: 0
CHILLS: 0
DOUBLE VISION: 0
NEUROLOGICAL NEGATIVE: 1
SPEECH CHANGE: 0
SPUTUM PRODUCTION: 1
NECK PAIN: 0
FALLS: 0
VOMITING: 0
NERVOUS/ANXIOUS: 0
NEUROLOGICAL NEGATIVE: 1
SPEECH CHANGE: 0
FEVER: 0
CHILLS: 0
PALPITATIONS: 0
VOMITING: 0
ABDOMINAL PAIN: 0
SHORTNESS OF BREATH: 1
BRUISES/BLEEDS EASILY: 0
VOMITING: 0
CARDIOVASCULAR NEGATIVE: 1
BACK PAIN: 0
CONSTIPATION: 0
PALPITATIONS: 0
PSYCHIATRIC NEGATIVE: 1
FEVER: 0
WHEEZING: 0
NERVOUS/ANXIOUS: 0
INSOMNIA: 0
FLANK PAIN: 0
PHOTOPHOBIA: 0
CHILLS: 0
FEVER: 0
ORTHOPNEA: 0
DIARRHEA: 0
HEADACHES: 0
COUGH: 1
WHEEZING: 0
CHILLS: 0
DIZZINESS: 1
RESPIRATORY NEGATIVE: 1
PALPITATIONS: 0
CHILLS: 0
NERVOUS/ANXIOUS: 0
PSYCHIATRIC NEGATIVE: 1
EYES NEGATIVE: 1
CONSTIPATION: 0
HEADACHES: 0
CHILLS: 0
NERVOUS/ANXIOUS: 0
BRUISES/BLEEDS EASILY: 0
VOMITING: 0
VOMITING: 0
WEAKNESS: 0
FEVER: 0
FEVER: 0
NAUSEA: 0
WEAKNESS: 1
SHORTNESS OF BREATH: 0
CONSTITUTIONAL NEGATIVE: 1
PALPITATIONS: 0
NERVOUS/ANXIOUS: 0
CHILLS: 0
DIAPHORESIS: 0
VOMITING: 0
BLURRED VISION: 0
BRUISES/BLEEDS EASILY: 0
NEUROLOGICAL NEGATIVE: 1
FOCAL WEAKNESS: 0
COUGH: 1
LOSS OF CONSCIOUSNESS: 0
HEADACHES: 0
COUGH: 1
DIARRHEA: 0
DIZZINESS: 0
DIZZINESS: 0
FLANK PAIN: 0
COUGH: 0
ORTHOPNEA: 0
BLURRED VISION: 0
HEADACHES: 0
CHILLS: 0
HEADACHES: 0
NAUSEA: 0
GASTROINTESTINAL NEGATIVE: 1
DIARRHEA: 0
NAUSEA: 0
HEADACHES: 0
SHORTNESS OF BREATH: 1
NAUSEA: 0
HEMOPTYSIS: 0
NECK PAIN: 0
CARDIOVASCULAR NEGATIVE: 1
EYE PAIN: 0
COUGH: 1
DIAPHORESIS: 0
SHORTNESS OF BREATH: 1
DIZZINESS: 0
SHORTNESS OF BREATH: 0
NECK PAIN: 0
VOMITING: 0
VOMITING: 0
BLURRED VISION: 0
SORE THROAT: 0
CHILLS: 0
DIZZINESS: 1
ORTHOPNEA: 0
NAUSEA: 0
FEVER: 0
COUGH: 0
WEAKNESS: 1
WEAKNESS: 1
DIZZINESS: 0
FEVER: 0
BRUISES/BLEEDS EASILY: 0
SHORTNESS OF BREATH: 0
NAUSEA: 0
NERVOUS/ANXIOUS: 1
LOSS OF CONSCIOUSNESS: 0
VOMITING: 0
SHORTNESS OF BREATH: 0
PALPITATIONS: 0
SPEECH CHANGE: 0
EYES NEGATIVE: 1
NAUSEA: 0
PALPITATIONS: 0
ORTHOPNEA: 0
NERVOUS/ANXIOUS: 0
DOUBLE VISION: 0
BLURRED VISION: 0
POLYDIPSIA: 0
FEVER: 0
SHORTNESS OF BREATH: 0
CHILLS: 0
NEUROLOGICAL NEGATIVE: 1
DIZZINESS: 0
NERVOUS/ANXIOUS: 1
MYALGIAS: 0
BLURRED VISION: 0
DIAPHORESIS: 0
NAUSEA: 0
CHILLS: 0
CONSTITUTIONAL NEGATIVE: 1
VOMITING: 0
NECK PAIN: 0
CHILLS: 0
HEADACHES: 0
HEARTBURN: 0
BLURRED VISION: 0
HEADACHES: 0
PALPITATIONS: 0
COUGH: 0
MYALGIAS: 0
WEIGHT LOSS: 0
CHILLS: 0
FOCAL WEAKNESS: 0
SORE THROAT: 0
NAUSEA: 0
FEVER: 0
SPUTUM PRODUCTION: 1
FOCAL WEAKNESS: 0
DIZZINESS: 0
DIARRHEA: 1
CONSTITUTIONAL NEGATIVE: 1
CARDIOVASCULAR NEGATIVE: 1
WEAKNESS: 1
FEVER: 0
BRUISES/BLEEDS EASILY: 0
CHILLS: 0
EYES NEGATIVE: 1
WEAKNESS: 0
PALPITATIONS: 0
MYALGIAS: 0
PSYCHIATRIC NEGATIVE: 1
PALPITATIONS: 0
PALPITATIONS: 0
HEMOPTYSIS: 0
CONSTIPATION: 0
SORE THROAT: 0
CLAUDICATION: 0
BACK PAIN: 0
COUGH: 0
WHEEZING: 0
PHOTOPHOBIA: 0
NAUSEA: 0
SHORTNESS OF BREATH: 1
VOMITING: 0
HALLUCINATIONS: 0
NAUSEA: 0
SHORTNESS OF BREATH: 0
SEIZURES: 0
VOMITING: 0
BRUISES/BLEEDS EASILY: 0
NECK PAIN: 0
GASTROINTESTINAL NEGATIVE: 1
SPEECH CHANGE: 0
ABDOMINAL PAIN: 0
SHORTNESS OF BREATH: 0
SHORTNESS OF BREATH: 0
NEUROLOGICAL NEGATIVE: 1
VOMITING: 0
GASTROINTESTINAL NEGATIVE: 1
DIZZINESS: 1
COUGH: 0
ABDOMINAL PAIN: 0
SENSORY CHANGE: 0
ABDOMINAL PAIN: 0
HEADACHES: 0
HEADACHES: 0
ABDOMINAL PAIN: 1
BRUISES/BLEEDS EASILY: 0
MYALGIAS: 0
HEADACHES: 0
ABDOMINAL PAIN: 0
CARDIOVASCULAR NEGATIVE: 1
WEAKNESS: 0
RESPIRATORY NEGATIVE: 1
GASTROINTESTINAL NEGATIVE: 1
NEUROLOGICAL NEGATIVE: 1
WEAKNESS: 1
NAUSEA: 0
BACK PAIN: 0
COUGH: 1
EYE PAIN: 0
COUGH: 0
NAUSEA: 0
VOMITING: 0
CHILLS: 0
COUGH: 0
NAUSEA: 0
HEARTBURN: 0
COUGH: 1
LOSS OF CONSCIOUSNESS: 0
HEARTBURN: 0
COUGH: 1
BLURRED VISION: 0
FEVER: 0
BLURRED VISION: 0
DIZZINESS: 0
HEADACHES: 0
SPUTUM PRODUCTION: 0
WEAKNESS: 1
SHORTNESS OF BREATH: 0
WHEEZING: 0
DIZZINESS: 0
CARDIOVASCULAR NEGATIVE: 1
HEMOPTYSIS: 0
BLURRED VISION: 0
ABDOMINAL PAIN: 0
ABDOMINAL PAIN: 0
EYES NEGATIVE: 1
NERVOUS/ANXIOUS: 0
HEMOPTYSIS: 0
MYALGIAS: 0
VOMITING: 0
VOMITING: 0
FEVER: 0
DEPRESSION: 0
MUSCULOSKELETAL NEGATIVE: 1
WEAKNESS: 1
HEADACHES: 0
HEMOPTYSIS: 0
NERVOUS/ANXIOUS: 0
WEAKNESS: 1
CHILLS: 0
VOMITING: 0
BACK PAIN: 0
WEAKNESS: 0
HEARTBURN: 0
VOMITING: 0
COUGH: 1
CHILLS: 0
SPUTUM PRODUCTION: 1
NAUSEA: 0
WHEEZING: 1
HEARTBURN: 0
FLANK PAIN: 0
PALPITATIONS: 0
PALPITATIONS: 0
BACK PAIN: 0
SHORTNESS OF BREATH: 0
NERVOUS/ANXIOUS: 1
PSYCHIATRIC NEGATIVE: 1
PSYCHIATRIC NEGATIVE: 1
CHILLS: 0
ABDOMINAL PAIN: 0
DIARRHEA: 1
ABDOMINAL PAIN: 0
CHILLS: 0
ABDOMINAL PAIN: 1
COUGH: 0
VOMITING: 0
NAUSEA: 0
COUGH: 1
COUGH: 0
WHEEZING: 0
FEVER: 0
DIZZINESS: 0
HEADACHES: 0
SPEECH CHANGE: 0
SHORTNESS OF BREATH: 0
HEADACHES: 0
NAUSEA: 0
FEVER: 0
VOMITING: 0
SORE THROAT: 1
DIARRHEA: 0
FEVER: 0
DIAPHORESIS: 0
CONSTIPATION: 0
NAUSEA: 0
FEVER: 0
RESPIRATORY NEGATIVE: 1
NERVOUS/ANXIOUS: 1
COUGH: 1
VOMITING: 0
BLURRED VISION: 0
GASTROINTESTINAL NEGATIVE: 1
NAUSEA: 0
BRUISES/BLEEDS EASILY: 0
FEVER: 0
PSYCHIATRIC NEGATIVE: 1
COUGH: 0
RESPIRATORY NEGATIVE: 1
PSYCHIATRIC NEGATIVE: 1
DIARRHEA: 1
BACK PAIN: 0
ORTHOPNEA: 0
SHORTNESS OF BREATH: 0
MUSCULOSKELETAL NEGATIVE: 1
PHOTOPHOBIA: 0
PALPITATIONS: 0
HALLUCINATIONS: 0
DIARRHEA: 1
EYES NEGATIVE: 1
FEVER: 0
ABDOMINAL PAIN: 0
NAUSEA: 0
NAUSEA: 0
MYALGIAS: 0
FEVER: 0
COUGH: 1
ABDOMINAL PAIN: 0
SHORTNESS OF BREATH: 0
COUGH: 0
COUGH: 0
VOMITING: 0
LOSS OF CONSCIOUSNESS: 0
HEADACHES: 0
MYALGIAS: 0
SEIZURES: 0
CARDIOVASCULAR NEGATIVE: 1
CARDIOVASCULAR NEGATIVE: 1
TREMORS: 0
BACK PAIN: 0
VOMITING: 0
POLYDIPSIA: 0
DIZZINESS: 0
DIZZINESS: 0
INSOMNIA: 0
VOMITING: 0
CHILLS: 0
CARDIOVASCULAR NEGATIVE: 1
FEVER: 0
BACK PAIN: 0
BLURRED VISION: 0
NAUSEA: 0
WHEEZING: 0
DEPRESSION: 0
SPUTUM PRODUCTION: 1
CARDIOVASCULAR NEGATIVE: 1
NEUROLOGICAL NEGATIVE: 1
BRUISES/BLEEDS EASILY: 0
CONSTIPATION: 0
WEAKNESS: 0
NERVOUS/ANXIOUS: 1
ABDOMINAL PAIN: 0
PSYCHIATRIC NEGATIVE: 1
VOMITING: 0
CHILLS: 0
COUGH: 0
NAUSEA: 0
WHEEZING: 0
SHORTNESS OF BREATH: 1
GASTROINTESTINAL NEGATIVE: 1
HEMOPTYSIS: 0
GASTROINTESTINAL NEGATIVE: 1
PALPITATIONS: 0
TINGLING: 1
DIARRHEA: 0
FOCAL WEAKNESS: 0
ABDOMINAL PAIN: 0
VOMITING: 0
DOUBLE VISION: 0
CHILLS: 0
FLANK PAIN: 0
COUGH: 0
CARDIOVASCULAR NEGATIVE: 1
SENSORY CHANGE: 1
BLOOD IN STOOL: 0
VOMITING: 0
FEVER: 0
CHILLS: 0
MYALGIAS: 0
NAUSEA: 0
BLURRED VISION: 0
ABDOMINAL PAIN: 0
NERVOUS/ANXIOUS: 0
BLURRED VISION: 0
MYALGIAS: 0
FEVER: 0
SHORTNESS OF BREATH: 0
COUGH: 1
NEUROLOGICAL NEGATIVE: 1
TREMORS: 0
ABDOMINAL PAIN: 0
RESPIRATORY NEGATIVE: 1
NERVOUS/ANXIOUS: 0
NAUSEA: 0
ORTHOPNEA: 0
COUGH: 1
CARDIOVASCULAR NEGATIVE: 1
NECK PAIN: 0
HEMOPTYSIS: 0
TREMORS: 0
MYALGIAS: 0
BLURRED VISION: 0
POLYDIPSIA: 0
WHEEZING: 0
PALPITATIONS: 0
BRUISES/BLEEDS EASILY: 0
CARDIOVASCULAR NEGATIVE: 1
VOMITING: 0
SHORTNESS OF BREATH: 1
VOMITING: 0
TINGLING: 0
NEUROLOGICAL NEGATIVE: 1
WEAKNESS: 1
PSYCHIATRIC NEGATIVE: 1
EYES NEGATIVE: 1
BACK PAIN: 0
DIARRHEA: 0
CHILLS: 0
VOMITING: 0
COUGH: 0
FEVER: 0
LOSS OF CONSCIOUSNESS: 0
NAUSEA: 0
DEPRESSION: 0
BRUISES/BLEEDS EASILY: 0
CARDIOVASCULAR NEGATIVE: 1
FEVER: 0
ABDOMINAL PAIN: 0
SORE THROAT: 0
ORTHOPNEA: 0
SEIZURES: 0
MYALGIAS: 0
HEADACHES: 0
FEVER: 0
FEVER: 0
COUGH: 0
EYE DISCHARGE: 0
SHORTNESS OF BREATH: 0
SHORTNESS OF BREATH: 0
VOMITING: 0
NERVOUS/ANXIOUS: 1
VOMITING: 0
CHILLS: 0
LOSS OF CONSCIOUSNESS: 0
DEPRESSION: 0
PSYCHIATRIC NEGATIVE: 1
SENSORY CHANGE: 1
NAUSEA: 0
BRUISES/BLEEDS EASILY: 0
COUGH: 0
COUGH: 1
SHORTNESS OF BREATH: 0
ABDOMINAL PAIN: 0
PALPITATIONS: 0
BLOOD IN STOOL: 0
EYES NEGATIVE: 1
FEVER: 0
DIARRHEA: 0
DIZZINESS: 0
BLURRED VISION: 0
PALPITATIONS: 0
DIZZINESS: 0
PALPITATIONS: 0
ABDOMINAL PAIN: 0
NERVOUS/ANXIOUS: 1
SHORTNESS OF BREATH: 0
VOMITING: 0
BLURRED VISION: 0
FALLS: 0
SORE THROAT: 0
FEVER: 0
FEVER: 0
COUGH: 0
CHILLS: 0
ABDOMINAL PAIN: 0
BLURRED VISION: 0
RESPIRATORY NEGATIVE: 1
FOCAL WEAKNESS: 0
EYES NEGATIVE: 1
VOMITING: 0
FLANK PAIN: 0
FEVER: 0
SHORTNESS OF BREATH: 1
SHORTNESS OF BREATH: 0
WHEEZING: 0
SENSORY CHANGE: 0
EYES NEGATIVE: 1
FLANK PAIN: 0
BLURRED VISION: 0
PSYCHIATRIC NEGATIVE: 1
PALPITATIONS: 0
NAUSEA: 0
HEADACHES: 0
BACK PAIN: 0
COUGH: 0
VOMITING: 0
NAUSEA: 0
WHEEZING: 0
RESPIRATORY NEGATIVE: 1
EYES NEGATIVE: 1
DIARRHEA: 0
SHORTNESS OF BREATH: 1
SHORTNESS OF BREATH: 0
CLAUDICATION: 0
FEVER: 0
ABDOMINAL PAIN: 0
BRUISES/BLEEDS EASILY: 0
ABDOMINAL PAIN: 0
FOCAL WEAKNESS: 0
MUSCULOSKELETAL NEGATIVE: 1
HEADACHES: 0
HEADACHES: 0
NAUSEA: 0
VOMITING: 0
PSYCHIATRIC NEGATIVE: 1
HEADACHES: 0
DIZZINESS: 0
SEIZURES: 0
ABDOMINAL PAIN: 0
SHORTNESS OF BREATH: 1
FEVER: 0
BACK PAIN: 0
BRUISES/BLEEDS EASILY: 0
TINGLING: 0
PALPITATIONS: 0
DIARRHEA: 0
SPEECH CHANGE: 0
BACK PAIN: 0
DIARRHEA: 0
BACK PAIN: 0
PSYCHIATRIC NEGATIVE: 1
ORTHOPNEA: 0
BACK PAIN: 0
COUGH: 0
NERVOUS/ANXIOUS: 0
SPEECH CHANGE: 0
CONSTITUTIONAL NEGATIVE: 1
MUSCULOSKELETAL NEGATIVE: 1
BLURRED VISION: 0
FOCAL WEAKNESS: 0
ABDOMINAL PAIN: 0
NAUSEA: 0
DOUBLE VISION: 0
ABDOMINAL PAIN: 0
SHORTNESS OF BREATH: 0
FEVER: 0
DIARRHEA: 0
PSYCHIATRIC NEGATIVE: 1
SHORTNESS OF BREATH: 0
BRUISES/BLEEDS EASILY: 0
BACK PAIN: 0
DIZZINESS: 0
SENSORY CHANGE: 0
DEPRESSION: 0
COUGH: 1
CARDIOVASCULAR NEGATIVE: 1
EYE DISCHARGE: 0
INSOMNIA: 0
COUGH: 1
BACK PAIN: 0
NEUROLOGICAL NEGATIVE: 1
SHORTNESS OF BREATH: 0
BLURRED VISION: 0
DIZZINESS: 0
DEPRESSION: 0
WHEEZING: 0
TINGLING: 0
MYALGIAS: 0
VOMITING: 0
CHILLS: 0
ABDOMINAL PAIN: 0
COUGH: 0
PALPITATIONS: 0
NEUROLOGICAL NEGATIVE: 1
NAUSEA: 0
SPEECH CHANGE: 0
HEADACHES: 0
CHILLS: 0
FLANK PAIN: 0
FEVER: 0
SHORTNESS OF BREATH: 0
MYALGIAS: 0
COUGH: 0
COUGH: 0
LOSS OF CONSCIOUSNESS: 0
SORE THROAT: 0
HEARTBURN: 0
HEADACHES: 0
GASTROINTESTINAL NEGATIVE: 1
CHILLS: 0
PALPITATIONS: 0
NAUSEA: 0
FOCAL WEAKNESS: 0
MYALGIAS: 0
NAUSEA: 0
ABDOMINAL PAIN: 0
NEUROLOGICAL NEGATIVE: 1
COUGH: 0
SPUTUM PRODUCTION: 1
BLURRED VISION: 0
CONSTITUTIONAL NEGATIVE: 1
GASTROINTESTINAL NEGATIVE: 1
BLURRED VISION: 0
WHEEZING: 0
NEUROLOGICAL NEGATIVE: 1
DIAPHORESIS: 0
NAUSEA: 0
BLURRED VISION: 0
FOCAL WEAKNESS: 0
NERVOUS/ANXIOUS: 0
SPEECH CHANGE: 0
CONSTIPATION: 0
FEVER: 0
SHORTNESS OF BREATH: 0
DIARRHEA: 1
DIZZINESS: 0
MYALGIAS: 0
BLURRED VISION: 0
EYES NEGATIVE: 1
HEMOPTYSIS: 0
HEADACHES: 0
DOUBLE VISION: 0
CONSTIPATION: 0
HEADACHES: 0
FEVER: 0
DOUBLE VISION: 0
SHORTNESS OF BREATH: 0
POLYDIPSIA: 0
CARDIOVASCULAR NEGATIVE: 1
ABDOMINAL PAIN: 0
HEMOPTYSIS: 0
PSYCHIATRIC NEGATIVE: 1
MYALGIAS: 0
DIZZINESS: 0
MYALGIAS: 0
PALPITATIONS: 0
NAUSEA: 0
DIARRHEA: 0
ABDOMINAL PAIN: 0
PHOTOPHOBIA: 0
FALLS: 0
DOUBLE VISION: 0
DEPRESSION: 0
FEVER: 0
SEIZURES: 0
CLAUDICATION: 0
CHILLS: 0
NEUROLOGICAL NEGATIVE: 1
RESPIRATORY NEGATIVE: 1
MYALGIAS: 0
BLURRED VISION: 0
HALLUCINATIONS: 0
SHORTNESS OF BREATH: 0
SHORTNESS OF BREATH: 1
FEVER: 0
NEUROLOGICAL NEGATIVE: 1
SHORTNESS OF BREATH: 0
SHORTNESS OF BREATH: 1
DEPRESSION: 0
HEADACHES: 0
SHORTNESS OF BREATH: 0
STRIDOR: 0
EYES NEGATIVE: 1
SPUTUM PRODUCTION: 0
ABDOMINAL PAIN: 0
NERVOUS/ANXIOUS: 1
NECK PAIN: 0
PALPITATIONS: 0
NAUSEA: 0
FEVER: 0
NECK PAIN: 0
SHORTNESS OF BREATH: 0
CHILLS: 0
DIARRHEA: 1
FEVER: 0
WEAKNESS: 1
SHORTNESS OF BREATH: 0
NAUSEA: 0
DIARRHEA: 1
ABDOMINAL PAIN: 0
FEVER: 0
BACK PAIN: 0
NERVOUS/ANXIOUS: 0
PALPITATIONS: 0
NECK PAIN: 0
WEAKNESS: 1
BACK PAIN: 0
SHORTNESS OF BREATH: 1
EYES NEGATIVE: 1

## 2021-01-01 ASSESSMENT — FIBROSIS 4 INDEX
FIB4 SCORE: 0.9
FIB4 SCORE: 1.34
FIB4 SCORE: 1.1
FIB4 SCORE: 1.22
FIB4 SCORE: 1.12
FIB4 SCORE: 1.91
FIB4 SCORE: 0.48
FIB4 SCORE: 0.77
FIB4 SCORE: 0.98
FIB4 SCORE: 2.96
FIB4 SCORE: 1.25
FIB4 SCORE: 1.555634918610404553
FIB4 SCORE: 1.22
FIB4 SCORE: 1.555634918610404553
FIB4 SCORE: 0.68
FIB4 SCORE: 0.83
FIB4 SCORE: 2.5
FIB4 SCORE: 0.77
FIB4 SCORE: 1.03
FIB4 SCORE: 0.67
FIB4 SCORE: 1.34
FIB4 SCORE: 1.31
FIB4 SCORE: 1.91
FIB4 SCORE: 1.46
FIB4 SCORE: 0.91
FIB4 SCORE: 1.36
FIB4 SCORE: 1.190103216423260314
FIB4 SCORE: 0.82
FIB4 SCORE: 1.02
FIB4 SCORE: 0.61
FIB4 SCORE: 1.1
FIB4 SCORE: 1.04
FIB4 SCORE: 0.97
FIB4 SCORE: 0.9

## 2021-01-01 ASSESSMENT — PAIN DESCRIPTION - PAIN TYPE
TYPE: ACUTE PAIN;SURGICAL PAIN
TYPE: ACUTE PAIN
TYPE: SURGICAL PAIN
TYPE: ACUTE PAIN
TYPE: ACUTE PAIN;CHRONIC PAIN
TYPE: ACUTE PAIN
TYPE: SURGICAL PAIN
TYPE: ACUTE PAIN
TYPE: ACUTE PAIN
TYPE: SURGICAL PAIN;ACUTE PAIN
TYPE: ACUTE PAIN
TYPE: ACUTE PAIN
TYPE: SURGICAL PAIN;CHRONIC PAIN
TYPE: ACUTE PAIN
TYPE: ACUTE PAIN;SURGICAL PAIN
TYPE: CHRONIC PAIN;ACUTE PAIN
TYPE: ACUTE PAIN
TYPE: ACUTE PAIN
TYPE: ACUTE PAIN;CHRONIC PAIN
TYPE: ACUTE PAIN;CHRONIC PAIN
TYPE: SURGICAL PAIN
TYPE: ACUTE PAIN
TYPE: ACUTE PAIN;SURGICAL PAIN;PHANTOM PAIN
TYPE: ACUTE PAIN
TYPE: ACUTE PAIN;CHRONIC PAIN
TYPE: SURGICAL PAIN
TYPE: ACUTE PAIN
TYPE: SURGICAL PAIN
TYPE: ACUTE PAIN
TYPE: ACUTE PAIN
TYPE: ACUTE PAIN;CHRONIC PAIN
TYPE: ACUTE PAIN;CHRONIC PAIN
TYPE: SURGICAL PAIN
TYPE: ACUTE PAIN
TYPE: ACUTE PAIN;CHRONIC PAIN
TYPE: ACUTE PAIN;CHRONIC PAIN
TYPE: ACUTE PAIN;SURGICAL PAIN
TYPE: ACUTE PAIN
TYPE: ACUTE PAIN;SURGICAL PAIN
TYPE: ACUTE PAIN
TYPE: ACUTE PAIN
TYPE: SURGICAL PAIN
TYPE: SURGICAL PAIN;ACUTE PAIN
TYPE: ACUTE PAIN;SURGICAL PAIN
TYPE: ACUTE PAIN
TYPE: ACUTE PAIN;SURGICAL PAIN;PHANTOM PAIN
TYPE: ACUTE PAIN
TYPE: ACUTE PAIN
TYPE: ACUTE PAIN;CHRONIC PAIN
TYPE: SURGICAL PAIN
TYPE: ACUTE PAIN;SURGICAL PAIN;PHANTOM PAIN
TYPE: ACUTE PAIN
TYPE: ACUTE PAIN;SURGICAL PAIN
TYPE: ACUTE PAIN
TYPE: ACUTE PAIN
TYPE: SURGICAL PAIN
TYPE: ACUTE PAIN
TYPE: ACUTE PAIN;NEUROPATHIC PAIN
TYPE: ACUTE PAIN;SURGICAL PAIN
TYPE: ACUTE PAIN
TYPE: ACUTE PAIN
TYPE: ACUTE PAIN;CHRONIC PAIN
TYPE: ACUTE PAIN
TYPE: SURGICAL PAIN
TYPE: ACUTE PAIN
TYPE: ACUTE PAIN;CHRONIC PAIN
TYPE: ACUTE PAIN
TYPE: ACUTE PAIN;SURGICAL PAIN
TYPE: ACUTE PAIN
TYPE: ACUTE PAIN;SURGICAL PAIN
TYPE: ACUTE PAIN;SURGICAL PAIN;PHANTOM PAIN
TYPE: CHRONIC PAIN
TYPE: SURGICAL PAIN
TYPE: ACUTE PAIN
TYPE: SURGICAL PAIN
TYPE: ACUTE PAIN
TYPE: CHRONIC PAIN
TYPE: ACUTE PAIN
TYPE: ACUTE PAIN
TYPE: ACUTE PAIN;CHRONIC PAIN
TYPE: ACUTE PAIN
TYPE: ACUTE PAIN;CHRONIC PAIN
TYPE: ACUTE PAIN
TYPE: ACUTE PAIN;SURGICAL PAIN
TYPE: ACUTE PAIN
TYPE: CHRONIC PAIN
TYPE: ACUTE PAIN
TYPE: ACUTE PAIN;SURGICAL PAIN
TYPE: ACUTE PAIN;SURGICAL PAIN
TYPE: ACUTE PAIN
TYPE: SURGICAL PAIN
TYPE: SURGICAL PAIN
TYPE: ACUTE PAIN
TYPE: ACUTE PAIN;CHRONIC PAIN
TYPE: ACUTE PAIN
TYPE: ACUTE PAIN;SURGICAL PAIN
TYPE: SURGICAL PAIN
TYPE: ACUTE PAIN
TYPE: SURGICAL PAIN
TYPE: SURGICAL PAIN
TYPE: CHRONIC PAIN
TYPE: SURGICAL PAIN
TYPE: CHRONIC PAIN
TYPE: ACUTE PAIN
TYPE: ACUTE PAIN;SURGICAL PAIN
TYPE: ACUTE PAIN
TYPE: ACUTE PAIN;SURGICAL PAIN
TYPE: ACUTE PAIN
TYPE: ACUTE PAIN;SURGICAL PAIN
TYPE: SURGICAL PAIN
TYPE: ACUTE PAIN;CHRONIC PAIN
TYPE: ACUTE PAIN
TYPE: ACUTE PAIN
TYPE: CHRONIC PAIN
TYPE: SURGICAL PAIN
TYPE: ACUTE PAIN;SURGICAL PAIN
TYPE: ACUTE PAIN
TYPE: ACUTE PAIN
TYPE: ACUTE PAIN;CHRONIC PAIN
TYPE: ACUTE PAIN;CHRONIC PAIN
TYPE: ACUTE PAIN;SURGICAL PAIN
TYPE: ACUTE PAIN
TYPE: ACUTE PAIN;CHRONIC PAIN
TYPE: ACUTE PAIN;CHRONIC PAIN
TYPE: ACUTE PAIN
TYPE: SURGICAL PAIN
TYPE: ACUTE PAIN
TYPE: CHRONIC PAIN
TYPE: ACUTE PAIN
TYPE: CHRONIC PAIN
TYPE: ACUTE PAIN
TYPE: CHRONIC PAIN
TYPE: ACUTE PAIN
TYPE: SURGICAL PAIN
TYPE: CHRONIC PAIN;ACUTE PAIN
TYPE: ACUTE PAIN;NEUROPATHIC PAIN
TYPE: ACUTE PAIN
TYPE: SURGICAL PAIN
TYPE: ACUTE PAIN
TYPE: ACUTE PAIN;SURGICAL PAIN
TYPE: ACUTE PAIN
TYPE: ACUTE PAIN;CHRONIC PAIN
TYPE: ACUTE PAIN
TYPE: SURGICAL PAIN
TYPE: SURGICAL PAIN
TYPE: ACUTE PAIN
TYPE: ACUTE PAIN;SURGICAL PAIN
TYPE: CHRONIC PAIN
TYPE: ACUTE PAIN
TYPE: SURGICAL PAIN
TYPE: SURGICAL PAIN
TYPE: CHRONIC PAIN
TYPE: ACUTE PAIN
TYPE: SURGICAL PAIN
TYPE: ACUTE PAIN
TYPE: ACUTE PAIN
TYPE: SURGICAL PAIN
TYPE: ACUTE PAIN
TYPE: ACUTE PAIN
TYPE: ACUTE PAIN;CHRONIC PAIN
TYPE: ACUTE PAIN;SURGICAL PAIN
TYPE: CHRONIC PAIN
TYPE: ACUTE PAIN
TYPE: ACUTE PAIN
TYPE: SURGICAL PAIN
TYPE: ACUTE PAIN;CHRONIC PAIN
TYPE: ACUTE PAIN
TYPE: SURGICAL PAIN
TYPE: ACUTE PAIN

## 2021-01-01 ASSESSMENT — LIFESTYLE VARIABLES
CONSUMPTION TOTAL: NEGATIVE
DO YOU DRINK ALCOHOL: NO
HAVE YOU EVER FELT YOU SHOULD CUT DOWN ON YOUR DRINKING: NO
TOTAL SCORE: 0
ALCOHOL_USE: NO
TOTAL SCORE: 0
EVER HAD A DRINK FIRST THING IN THE MORNING TO STEADY YOUR NERVES TO GET RID OF A HANGOVER: NO
TOTAL SCORE: 0
DOES PATIENT WANT TO STOP DRINKING: NO
DO YOU DRINK ALCOHOL: NO
AVERAGE NUMBER OF DAYS PER WEEK YOU HAVE A DRINK CONTAINING ALCOHOL: 0
AVERAGE NUMBER OF DAYS PER WEEK YOU HAVE A DRINK CONTAINING ALCOHOL: 0
TOTAL SCORE: 0
EVER FELT BAD OR GUILTY ABOUT YOUR DRINKING: NO
ON A TYPICAL DAY WHEN YOU DRINK ALCOHOL HOW MANY DRINKS DO YOU HAVE: 0
HAVE YOU EVER FELT YOU SHOULD CUT DOWN ON YOUR DRINKING: NO
DO YOU DRINK ALCOHOL: NO
ON A TYPICAL DAY WHEN YOU DRINK ALCOHOL HOW MANY DRINKS DO YOU HAVE: 0
CONSUMPTION TOTAL: NEGATIVE
EVER FELT BAD OR GUILTY ABOUT YOUR DRINKING: NO
ALCOHOL_USE: NO
EVER HAD A DRINK FIRST THING IN THE MORNING TO STEADY YOUR NERVES TO GET RID OF A HANGOVER: NO
EVER HAD A DRINK FIRST THING IN THE MORNING TO STEADY YOUR NERVES TO GET RID OF A HANGOVER: NO
DOES PATIENT WANT TO STOP DRINKING: NO
EVER HAD A DRINK FIRST THING IN THE MORNING TO STEADY YOUR NERVES TO GET RID OF A HANGOVER: NO
TOTAL SCORE: 0
CONSUMPTION TOTAL: NEGATIVE
HOW MANY TIMES IN THE PAST YEAR HAVE YOU HAD 5 OR MORE DRINKS IN A DAY: 0
EVER FELT BAD OR GUILTY ABOUT YOUR DRINKING: NO
EVER_SMOKED: YES
HOW MANY TIMES IN THE PAST YEAR HAVE YOU HAD 5 OR MORE DRINKS IN A DAY: 0
TOTAL SCORE: 0
ALCOHOL_USE: NO
HAVE PEOPLE ANNOYED YOU BY CRITICIZING YOUR DRINKING: NO
TOTAL SCORE: 0
ALCOHOL_USE: NO
AVERAGE NUMBER OF DAYS PER WEEK YOU HAVE A DRINK CONTAINING ALCOHOL: 0
TOTAL SCORE: 0
TOTAL SCORE: 0
EVER FELT BAD OR GUILTY ABOUT YOUR DRINKING: NO
DOES PATIENT WANT TO STOP DRINKING: NO
HAVE YOU EVER FELT YOU SHOULD CUT DOWN ON YOUR DRINKING: NO
ALCOHOL_USE: NO
HAVE YOU EVER FELT YOU SHOULD CUT DOWN ON YOUR DRINKING: NO
TOTAL SCORE: 0
HOW MANY TIMES IN THE PAST YEAR HAVE YOU HAD 5 OR MORE DRINKS IN A DAY: 0
AVERAGE NUMBER OF DAYS PER WEEK YOU HAVE A DRINK CONTAINING ALCOHOL: 0
CONSUMPTION TOTAL: NEGATIVE
TOTAL SCORE: 0
EVER HAD A DRINK FIRST THING IN THE MORNING TO STEADY YOUR NERVES TO GET RID OF A HANGOVER: NO
EVER_SMOKED: YES
HAVE PEOPLE ANNOYED YOU BY CRITICIZING YOUR DRINKING: NO
HAVE PEOPLE ANNOYED YOU BY CRITICIZING YOUR DRINKING: NO
CONSUMPTION TOTAL: NEGATIVE
HOW MANY TIMES IN THE PAST YEAR HAVE YOU HAD 5 OR MORE DRINKS IN A DAY: 0
ALCOHOL_USE: NO
HAVE YOU EVER FELT YOU SHOULD CUT DOWN ON YOUR DRINKING: NO
EVER HAD A DRINK FIRST THING IN THE MORNING TO STEADY YOUR NERVES TO GET RID OF A HANGOVER: NO
HAVE PEOPLE ANNOYED YOU BY CRITICIZING YOUR DRINKING: NO
HAVE YOU EVER FELT YOU SHOULD CUT DOWN ON YOUR DRINKING: NO
HOW MANY TIMES IN THE PAST YEAR HAVE YOU HAD 5 OR MORE DRINKS IN A DAY: 0
TOTAL SCORE: 0
DOES PATIENT WANT TO STOP DRINKING: NO
AVERAGE NUMBER OF DAYS PER WEEK YOU HAVE A DRINK CONTAINING ALCOHOL: 0
ON A TYPICAL DAY WHEN YOU DRINK ALCOHOL HOW MANY DRINKS DO YOU HAVE: 0
TOTAL SCORE: 0
EVER FELT BAD OR GUILTY ABOUT YOUR DRINKING: NO
AVERAGE NUMBER OF DAYS PER WEEK YOU HAVE A DRINK CONTAINING ALCOHOL: 0
TOTAL SCORE: 0
ON A TYPICAL DAY WHEN YOU DRINK ALCOHOL HOW MANY DRINKS DO YOU HAVE: 0
ON A TYPICAL DAY WHEN YOU DRINK ALCOHOL HOW MANY DRINKS DO YOU HAVE: 0
HAVE PEOPLE ANNOYED YOU BY CRITICIZING YOUR DRINKING: NO
HOW MANY TIMES IN THE PAST YEAR HAVE YOU HAD 5 OR MORE DRINKS IN A DAY: 0
CONSUMPTION TOTAL: NEGATIVE
HAVE PEOPLE ANNOYED YOU BY CRITICIZING YOUR DRINKING: NO
EVER FELT BAD OR GUILTY ABOUT YOUR DRINKING: NO
TOTAL SCORE: 0
ON A TYPICAL DAY WHEN YOU DRINK ALCOHOL HOW MANY DRINKS DO YOU HAVE: 0

## 2021-01-01 ASSESSMENT — COGNITIVE AND FUNCTIONAL STATUS - GENERAL
MOVING FROM LYING ON BACK TO SITTING ON SIDE OF FLAT BED: UNABLE
STANDING UP FROM CHAIR USING ARMS: TOTAL
MOVING FROM LYING ON BACK TO SITTING ON SIDE OF FLAT BED: A LITTLE
WALKING IN HOSPITAL ROOM: TOTAL
MOVING FROM LYING ON BACK TO SITTING ON SIDE OF FLAT BED: UNABLE
CLIMB 3 TO 5 STEPS WITH RAILING: TOTAL
SUGGESTED CMS G CODE MODIFIER MOBILITY: CL
TURNING FROM BACK TO SIDE WHILE IN FLAT BAD: A LITTLE
MOVING TO AND FROM BED TO CHAIR: A LITTLE
MOVING FROM LYING ON BACK TO SITTING ON SIDE OF FLAT BED: UNABLE
SUGGESTED CMS G CODE MODIFIER DAILY ACTIVITY: CK
PERSONAL GROOMING: A LOT
CLIMB 3 TO 5 STEPS WITH RAILING: TOTAL
MOVING TO AND FROM BED TO CHAIR: UNABLE
STANDING UP FROM CHAIR USING ARMS: A LOT
WALKING IN HOSPITAL ROOM: A LITTLE
STANDING UP FROM CHAIR USING ARMS: TOTAL
CLIMB 3 TO 5 STEPS WITH RAILING: TOTAL
DRESSING REGULAR LOWER BODY CLOTHING: A LOT
MOVING TO AND FROM BED TO CHAIR: A LITTLE
MOVING FROM LYING ON BACK TO SITTING ON SIDE OF FLAT BED: UNABLE
EATING MEALS: A LITTLE
MOBILITY SCORE: 18
TOILETING: A LOT
MOVING FROM LYING ON BACK TO SITTING ON SIDE OF FLAT BED: A LITTLE
SUGGESTED CMS G CODE MODIFIER DAILY ACTIVITY: CJ
MOVING FROM LYING ON BACK TO SITTING ON SIDE OF FLAT BED: UNABLE
STANDING UP FROM CHAIR USING ARMS: A LITTLE
SUGGESTED CMS G CODE MODIFIER MOBILITY: CJ
SUGGESTED CMS G CODE MODIFIER DAILY ACTIVITY: CK
CLIMB 3 TO 5 STEPS WITH RAILING: A LOT
DAILY ACTIVITIY SCORE: 13
SUGGESTED CMS G CODE MODIFIER DAILY ACTIVITY: CK
TOILETING: A LOT
HELP NEEDED FOR BATHING: A LOT
CLIMB 3 TO 5 STEPS WITH RAILING: A LOT
SUGGESTED CMS G CODE MODIFIER MOBILITY: CK
STANDING UP FROM CHAIR USING ARMS: TOTAL
MOVING FROM LYING ON BACK TO SITTING ON SIDE OF FLAT BED: A LITTLE
MOBILITY SCORE: 10
DRESSING REGULAR UPPER BODY CLOTHING: A LITTLE
TOILETING: A LOT
SUGGESTED CMS G CODE MODIFIER DAILY ACTIVITY: CJ
DRESSING REGULAR UPPER BODY CLOTHING: A LITTLE
MOVING FROM LYING ON BACK TO SITTING ON SIDE OF FLAT BED: A LOT
DRESSING REGULAR LOWER BODY CLOTHING: A LOT
DRESSING REGULAR LOWER BODY CLOTHING: A LITTLE
SUGGESTED CMS G CODE MODIFIER MOBILITY: CL
MOBILITY SCORE: 8
DAILY ACTIVITIY SCORE: 19
DRESSING REGULAR LOWER BODY CLOTHING: A LOT
STANDING UP FROM CHAIR USING ARMS: TOTAL
SUGGESTED CMS G CODE MODIFIER MOBILITY: CK
MOBILITY SCORE: 11
TURNING FROM BACK TO SIDE WHILE IN FLAT BAD: A LITTLE
SUGGESTED CMS G CODE MODIFIER MOBILITY: CL
PERSONAL GROOMING: A LOT
DRESSING REGULAR UPPER BODY CLOTHING: A LITTLE
DRESSING REGULAR LOWER BODY CLOTHING: A LOT
EATING MEALS: A LOT
TURNING FROM BACK TO SIDE WHILE IN FLAT BAD: A LITTLE
DRESSING REGULAR UPPER BODY CLOTHING: A LOT
TURNING FROM BACK TO SIDE WHILE IN FLAT BAD: A LITTLE
STANDING UP FROM CHAIR USING ARMS: TOTAL
MOBILITY SCORE: 8
DRESSING REGULAR UPPER BODY CLOTHING: A LOT
DRESSING REGULAR UPPER BODY CLOTHING: A LITTLE
MOBILITY SCORE: 19
STANDING UP FROM CHAIR USING ARMS: A LOT
DRESSING REGULAR LOWER BODY CLOTHING: A LITTLE
WALKING IN HOSPITAL ROOM: TOTAL
SUGGESTED CMS G CODE MODIFIER DAILY ACTIVITY: CK
SUGGESTED CMS G CODE MODIFIER MOBILITY: CM
DAILY ACTIVITIY SCORE: 21
STANDING UP FROM CHAIR USING ARMS: A LOT
WALKING IN HOSPITAL ROOM: TOTAL
TOILETING: A LOT
STANDING UP FROM CHAIR USING ARMS: A LOT
CLIMB 3 TO 5 STEPS WITH RAILING: TOTAL
WALKING IN HOSPITAL ROOM: TOTAL
PERSONAL GROOMING: A LITTLE
EATING MEALS: A LITTLE
DRESSING REGULAR LOWER BODY CLOTHING: A LITTLE
TURNING FROM BACK TO SIDE WHILE IN FLAT BAD: A LOT
CLIMB 3 TO 5 STEPS WITH RAILING: TOTAL
WALKING IN HOSPITAL ROOM: TOTAL
DAILY ACTIVITIY SCORE: 12
STANDING UP FROM CHAIR USING ARMS: A LITTLE
PERSONAL GROOMING: A LITTLE
PERSONAL GROOMING: A LOT
HELP NEEDED FOR BATHING: A LOT
SUGGESTED CMS G CODE MODIFIER DAILY ACTIVITY: CL
CLIMB 3 TO 5 STEPS WITH RAILING: TOTAL
TOILETING: A LOT
MOBILITY SCORE: 13
CLIMB 3 TO 5 STEPS WITH RAILING: A LITTLE
DAILY ACTIVITIY SCORE: 16
DRESSING REGULAR UPPER BODY CLOTHING: A LITTLE
TURNING FROM BACK TO SIDE WHILE IN FLAT BAD: A LOT
MOBILITY SCORE: 11
CLIMB 3 TO 5 STEPS WITH RAILING: TOTAL
SUGGESTED CMS G CODE MODIFIER DAILY ACTIVITY: CK
SUGGESTED CMS G CODE MODIFIER MOBILITY: CK
MOBILITY SCORE: 18
DRESSING REGULAR UPPER BODY CLOTHING: A LITTLE
MOVING FROM LYING ON BACK TO SITTING ON SIDE OF FLAT BED: UNABLE
WALKING IN HOSPITAL ROOM: TOTAL
DRESSING REGULAR UPPER BODY CLOTHING: A LITTLE
PERSONAL GROOMING: A LITTLE
TOILETING: A LITTLE
WALKING IN HOSPITAL ROOM: A LOT
SUGGESTED CMS G CODE MODIFIER MOBILITY: CL
SUGGESTED CMS G CODE MODIFIER DAILY ACTIVITY: CK
CLIMB 3 TO 5 STEPS WITH RAILING: A LITTLE
MOBILITY SCORE: 22
MOVING FROM LYING ON BACK TO SITTING ON SIDE OF FLAT BED: UNABLE
MOVING TO AND FROM BED TO CHAIR: A LOT
STANDING UP FROM CHAIR USING ARMS: A LITTLE
DAILY ACTIVITIY SCORE: 19
DAILY ACTIVITIY SCORE: 18
TURNING FROM BACK TO SIDE WHILE IN FLAT BAD: A LITTLE
SUGGESTED CMS G CODE MODIFIER DAILY ACTIVITY: CL
DAILY ACTIVITIY SCORE: 15
DAILY ACTIVITIY SCORE: 22
SUGGESTED CMS G CODE MODIFIER MOBILITY: CM
STANDING UP FROM CHAIR USING ARMS: A LOT
PERSONAL GROOMING: A LITTLE
SUGGESTED CMS G CODE MODIFIER MOBILITY: CM
TURNING FROM BACK TO SIDE WHILE IN FLAT BAD: A LITTLE
WALKING IN HOSPITAL ROOM: A LOT
WALKING IN HOSPITAL ROOM: TOTAL
STANDING UP FROM CHAIR USING ARMS: A LITTLE
WALKING IN HOSPITAL ROOM: A LITTLE
MOVING FROM LYING ON BACK TO SITTING ON SIDE OF FLAT BED: A LOT
MOVING TO AND FROM BED TO CHAIR: A LOT
MOVING FROM LYING ON BACK TO SITTING ON SIDE OF FLAT BED: A LITTLE
MOBILITY SCORE: 16
HELP NEEDED FOR BATHING: A LOT
MOBILITY SCORE: 10
EATING MEALS: A LITTLE
STANDING UP FROM CHAIR USING ARMS: TOTAL
DRESSING REGULAR LOWER BODY CLOTHING: A LITTLE
EATING MEALS: A LITTLE
SUGGESTED CMS G CODE MODIFIER MOBILITY: CM
TOILETING: A LOT
PERSONAL GROOMING: A LITTLE
DRESSING REGULAR LOWER BODY CLOTHING: A LOT
WALKING IN HOSPITAL ROOM: TOTAL
MOBILITY SCORE: 10
PERSONAL GROOMING: A LITTLE
DRESSING REGULAR UPPER BODY CLOTHING: A LITTLE
SUGGESTED CMS G CODE MODIFIER DAILY ACTIVITY: CK
SUGGESTED CMS G CODE MODIFIER MOBILITY: CL
DAILY ACTIVITIY SCORE: 16
TURNING FROM BACK TO SIDE WHILE IN FLAT BAD: A LITTLE
SUGGESTED CMS G CODE MODIFIER MOBILITY: CL
DRESSING REGULAR LOWER BODY CLOTHING: A LOT
MOVING TO AND FROM BED TO CHAIR: A LITTLE
SUGGESTED CMS G CODE MODIFIER DAILY ACTIVITY: CJ
MOVING FROM LYING ON BACK TO SITTING ON SIDE OF FLAT BED: UNABLE
SUGGESTED CMS G CODE MODIFIER DAILY ACTIVITY: CK
MOBILITY SCORE: 9
MOVING TO AND FROM BED TO CHAIR: A LITTLE
MOBILITY SCORE: 10
TOILETING: A LITTLE
SUGGESTED CMS G CODE MODIFIER DAILY ACTIVITY: CL
HELP NEEDED FOR BATHING: A LOT
CLIMB 3 TO 5 STEPS WITH RAILING: TOTAL
WALKING IN HOSPITAL ROOM: TOTAL
TURNING FROM BACK TO SIDE WHILE IN FLAT BAD: A LITTLE
DAILY ACTIVITIY SCORE: 17
MOVING TO AND FROM BED TO CHAIR: A LITTLE
EATING MEALS: A LITTLE
TURNING FROM BACK TO SIDE WHILE IN FLAT BAD: A LITTLE
CLIMB 3 TO 5 STEPS WITH RAILING: TOTAL
MOBILITY SCORE: 10
TURNING FROM BACK TO SIDE WHILE IN FLAT BAD: A LITTLE
EATING MEALS: A LITTLE
SUGGESTED CMS G CODE MODIFIER DAILY ACTIVITY: CK
MOVING FROM LYING ON BACK TO SITTING ON SIDE OF FLAT BED: UNABLE
TURNING FROM BACK TO SIDE WHILE IN FLAT BAD: A LITTLE
DAILY ACTIVITIY SCORE: 16
WALKING IN HOSPITAL ROOM: TOTAL
TURNING FROM BACK TO SIDE WHILE IN FLAT BAD: A LITTLE
DRESSING REGULAR LOWER BODY CLOTHING: A LOT
MOVING TO AND FROM BED TO CHAIR: A LITTLE
DRESSING REGULAR UPPER BODY CLOTHING: A LITTLE
HELP NEEDED FOR BATHING: A LOT
CLIMB 3 TO 5 STEPS WITH RAILING: TOTAL
DAILY ACTIVITIY SCORE: 15
PERSONAL GROOMING: A LITTLE
MOVING TO AND FROM BED TO CHAIR: UNABLE
TOILETING: A LOT
CLIMB 3 TO 5 STEPS WITH RAILING: A LOT
TOILETING: A LOT
HELP NEEDED FOR BATHING: A LITTLE
MOBILITY SCORE: 11
PERSONAL GROOMING: A LITTLE
HELP NEEDED FOR BATHING: A LOT
WALKING IN HOSPITAL ROOM: TOTAL
DRESSING REGULAR UPPER BODY CLOTHING: A LOT
CLIMB 3 TO 5 STEPS WITH RAILING: A LITTLE
STANDING UP FROM CHAIR USING ARMS: A LOT
MOVING FROM LYING ON BACK TO SITTING ON SIDE OF FLAT BED: A LOT
DRESSING REGULAR LOWER BODY CLOTHING: A LOT
TOILETING: A LITTLE
TOILETING: A LOT
SUGGESTED CMS G CODE MODIFIER MOBILITY: CL
CLIMB 3 TO 5 STEPS WITH RAILING: TOTAL
SUGGESTED CMS G CODE MODIFIER MOBILITY: CL
DAILY ACTIVITIY SCORE: 20
MOVING TO AND FROM BED TO CHAIR: A LITTLE
HELP NEEDED FOR BATHING: A LITTLE
CLIMB 3 TO 5 STEPS WITH RAILING: TOTAL
DRESSING REGULAR UPPER BODY CLOTHING: A LITTLE
SUGGESTED CMS G CODE MODIFIER MOBILITY: CL
DRESSING REGULAR LOWER BODY CLOTHING: A LITTLE
MOVING TO AND FROM BED TO CHAIR: A LOT
HELP NEEDED FOR BATHING: A LITTLE
DRESSING REGULAR LOWER BODY CLOTHING: A LOT
MOVING TO AND FROM BED TO CHAIR: A LOT
DAILY ACTIVITIY SCORE: 15
TOILETING: A LOT
MOVING FROM LYING ON BACK TO SITTING ON SIDE OF FLAT BED: UNABLE
DAILY ACTIVITIY SCORE: 19
HELP NEEDED FOR BATHING: A LITTLE
PERSONAL GROOMING: A LITTLE
DAILY ACTIVITIY SCORE: 13
HELP NEEDED FOR BATHING: A LOT
TOILETING: A LITTLE
WALKING IN HOSPITAL ROOM: A LITTLE
SUGGESTED CMS G CODE MODIFIER MOBILITY: CL
MOVING FROM LYING ON BACK TO SITTING ON SIDE OF FLAT BED: UNABLE
SUGGESTED CMS G CODE MODIFIER DAILY ACTIVITY: CK
DRESSING REGULAR UPPER BODY CLOTHING: A LITTLE
MOVING TO AND FROM BED TO CHAIR: UNABLE
TURNING FROM BACK TO SIDE WHILE IN FLAT BAD: A LITTLE
HELP NEEDED FOR BATHING: A LITTLE
WALKING IN HOSPITAL ROOM: A LOT
EATING MEALS: A LITTLE
DRESSING REGULAR UPPER BODY CLOTHING: A LITTLE
WALKING IN HOSPITAL ROOM: A LITTLE
DAILY ACTIVITIY SCORE: 19
CLIMB 3 TO 5 STEPS WITH RAILING: TOTAL
SUGGESTED CMS G CODE MODIFIER DAILY ACTIVITY: CJ
TOILETING: A LITTLE
TOILETING: A LITTLE
HELP NEEDED FOR BATHING: A LITTLE
DRESSING REGULAR LOWER BODY CLOTHING: A LITTLE
STANDING UP FROM CHAIR USING ARMS: A LITTLE
HELP NEEDED FOR BATHING: A LOT
SUGGESTED CMS G CODE MODIFIER MOBILITY: CK
CLIMB 3 TO 5 STEPS WITH RAILING: TOTAL
HELP NEEDED FOR BATHING: A LOT
SUGGESTED CMS G CODE MODIFIER MOBILITY: CL
DRESSING REGULAR UPPER BODY CLOTHING: A LITTLE
MOVING TO AND FROM BED TO CHAIR: A LITTLE
HELP NEEDED FOR BATHING: A LITTLE
STANDING UP FROM CHAIR USING ARMS: TOTAL
HELP NEEDED FOR BATHING: A LOT
MOVING TO AND FROM BED TO CHAIR: UNABLE
DRESSING REGULAR LOWER BODY CLOTHING: A LITTLE
MOBILITY SCORE: 9
MOBILITY SCORE: 19
HELP NEEDED FOR BATHING: A LITTLE
TOILETING: A LITTLE
TOILETING: A LOT
SUGGESTED CMS G CODE MODIFIER DAILY ACTIVITY: CK
WALKING IN HOSPITAL ROOM: A LITTLE
SUGGESTED CMS G CODE MODIFIER MOBILITY: CK
DRESSING REGULAR LOWER BODY CLOTHING: A LOT
MOVING FROM LYING ON BACK TO SITTING ON SIDE OF FLAT BED: UNABLE
DRESSING REGULAR LOWER BODY CLOTHING: A LOT
HELP NEEDED FOR BATHING: A LITTLE
STANDING UP FROM CHAIR USING ARMS: TOTAL
MOVING FROM LYING ON BACK TO SITTING ON SIDE OF FLAT BED: A LITTLE
WALKING IN HOSPITAL ROOM: TOTAL
CLIMB 3 TO 5 STEPS WITH RAILING: TOTAL
TURNING FROM BACK TO SIDE WHILE IN FLAT BAD: A LOT
MOVING TO AND FROM BED TO CHAIR: A LOT
MOBILITY SCORE: 12
DAILY ACTIVITIY SCORE: 21
MOBILITY SCORE: 10
SUGGESTED CMS G CODE MODIFIER DAILY ACTIVITY: CK
WALKING IN HOSPITAL ROOM: A LOT

## 2021-01-01 ASSESSMENT — PATIENT HEALTH QUESTIONNAIRE - PHQ9
1. LITTLE INTEREST OR PLEASURE IN DOING THINGS: NOT AT ALL
SUM OF ALL RESPONSES TO PHQ9 QUESTIONS 1 AND 2: 0
2. FEELING DOWN, DEPRESSED, IRRITABLE, OR HOPELESS: NOT AT ALL
2. FEELING DOWN, DEPRESSED, IRRITABLE, OR HOPELESS: NOT AT ALL
SUM OF ALL RESPONSES TO PHQ9 QUESTIONS 1 AND 2: 0
2. FEELING DOWN, DEPRESSED, IRRITABLE, OR HOPELESS: NOT AT ALL
1. LITTLE INTEREST OR PLEASURE IN DOING THINGS: NOT AT ALL
2. FEELING DOWN, DEPRESSED, IRRITABLE, OR HOPELESS: NOT AT ALL
SUM OF ALL RESPONSES TO PHQ9 QUESTIONS 1 AND 2: 0
1. LITTLE INTEREST OR PLEASURE IN DOING THINGS: NOT AT ALL
1. LITTLE INTEREST OR PLEASURE IN DOING THINGS: NOT AT ALL
2. FEELING DOWN, DEPRESSED, IRRITABLE, OR HOPELESS: NOT AT ALL
1. LITTLE INTEREST OR PLEASURE IN DOING THINGS: NOT AT ALL
2. FEELING DOWN, DEPRESSED, IRRITABLE, OR HOPELESS: NOT AT ALL
1. LITTLE INTEREST OR PLEASURE IN DOING THINGS: NOT AT ALL
2. FEELING DOWN, DEPRESSED, IRRITABLE, OR HOPELESS: NOT AT ALL
SUM OF ALL RESPONSES TO PHQ9 QUESTIONS 1 AND 2: 0
1. LITTLE INTEREST OR PLEASURE IN DOING THINGS: NOT AT ALL
SUM OF ALL RESPONSES TO PHQ9 QUESTIONS 1 AND 2: 0
2. FEELING DOWN, DEPRESSED, IRRITABLE, OR HOPELESS: NOT AT ALL
1. LITTLE INTEREST OR PLEASURE IN DOING THINGS: NOT AT ALL
1. LITTLE INTEREST OR PLEASURE IN DOING THINGS: NOT AT ALL
SUM OF ALL RESPONSES TO PHQ9 QUESTIONS 1 AND 2: 0
2. FEELING DOWN, DEPRESSED, IRRITABLE, OR HOPELESS: NOT AT ALL
SUM OF ALL RESPONSES TO PHQ9 QUESTIONS 1 AND 2: 0
1. LITTLE INTEREST OR PLEASURE IN DOING THINGS: NOT AT ALL
2. FEELING DOWN, DEPRESSED, IRRITABLE, OR HOPELESS: NOT AT ALL
SUM OF ALL RESPONSES TO PHQ9 QUESTIONS 1 AND 2: 0
1. LITTLE INTEREST OR PLEASURE IN DOING THINGS: NOT AT ALL
2. FEELING DOWN, DEPRESSED, IRRITABLE, OR HOPELESS: NOT AT ALL
2. FEELING DOWN, DEPRESSED, IRRITABLE, OR HOPELESS: NOT AT ALL
SUM OF ALL RESPONSES TO PHQ9 QUESTIONS 1 AND 2: 0
SUM OF ALL RESPONSES TO PHQ9 QUESTIONS 1 AND 2: 0
2. FEELING DOWN, DEPRESSED, IRRITABLE, OR HOPELESS: NOT AT ALL
1. LITTLE INTEREST OR PLEASURE IN DOING THINGS: NOT AT ALL
2. FEELING DOWN, DEPRESSED, IRRITABLE, OR HOPELESS: NOT AT ALL
2. FEELING DOWN, DEPRESSED, IRRITABLE, OR HOPELESS: NOT AT ALL
1. LITTLE INTEREST OR PLEASURE IN DOING THINGS: NOT AT ALL
1. LITTLE INTEREST OR PLEASURE IN DOING THINGS: NOT AT ALL
2. FEELING DOWN, DEPRESSED, IRRITABLE, OR HOPELESS: NOT AT ALL
SUM OF ALL RESPONSES TO PHQ9 QUESTIONS 1 AND 2: 0
1. LITTLE INTEREST OR PLEASURE IN DOING THINGS: NOT AT ALL
SUM OF ALL RESPONSES TO PHQ9 QUESTIONS 1 AND 2: 0
SUM OF ALL RESPONSES TO PHQ9 QUESTIONS 1 AND 2: 0
1. LITTLE INTEREST OR PLEASURE IN DOING THINGS: NOT AT ALL
2. FEELING DOWN, DEPRESSED, IRRITABLE, OR HOPELESS: NOT AT ALL
SUM OF ALL RESPONSES TO PHQ9 QUESTIONS 1 AND 2: 0
SUM OF ALL RESPONSES TO PHQ9 QUESTIONS 1 AND 2: 0
2. FEELING DOWN, DEPRESSED, IRRITABLE, OR HOPELESS: NOT AT ALL
1. LITTLE INTEREST OR PLEASURE IN DOING THINGS: NOT AT ALL
1. LITTLE INTEREST OR PLEASURE IN DOING THINGS: NOT AT ALL
SUM OF ALL RESPONSES TO PHQ9 QUESTIONS 1 AND 2: 0
SUM OF ALL RESPONSES TO PHQ9 QUESTIONS 1 AND 2: 0
1. LITTLE INTEREST OR PLEASURE IN DOING THINGS: NOT AT ALL
2. FEELING DOWN, DEPRESSED, IRRITABLE, OR HOPELESS: NOT AT ALL
1. LITTLE INTEREST OR PLEASURE IN DOING THINGS: NOT AT ALL
2. FEELING DOWN, DEPRESSED, IRRITABLE, OR HOPELESS: NOT AT ALL

## 2021-01-01 ASSESSMENT — ACTIVITIES OF DAILY LIVING (ADL)
TOILET_TRANSFER_LEVEL_OF_ASSIST: REQUIRES SUPERVISION WITH TOILET TRANSFER
BED_CHAIR_WHEELCHAIR_TRANSFER_DESCRIPTION: ADAPTIVE EQUIPMENT;INCREASED TIME;SUPERVISION FOR SAFETY;VERBAL CUEING
SHOWER_TRANSFER_LEVEL_OF_ASSIST: REQUIRES SUPERVISION WITH SHOWER TRANSFER
BED_CHAIR_WHEELCHAIR_TRANSFER_DESCRIPTION: ADAPTIVE EQUIPMENT;INCREASED TIME;VERBAL CUEING
TUB_SHOWER_TRANSFER_DESCRIPTION: ADAPTIVE EQUIPMENT;GRAB BAR;SHOWER BENCH;INCREASED TIME;SET-UP OF EQUIPMENT;SUPERVISION FOR SAFETY;VERBAL CUEING
TOILET_TRANSFER_DESCRIPTION: GRAB BAR;INCREASED TIME;SET-UP OF EQUIPMENT;SUPERVISION FOR SAFETY;VERBAL CUEING
TOILETING_LEVEL_OF_ASSIST_DESCRIPTION: GRAB BAR;INCREASED TIME;SET-UP OF EQUIPMENT;SUPERVISION FOR SAFETY;VERBAL CUEING
TOILETING: INDEPENDENT
TOILETING: INDEPENDENT
TOILET_TRANSFER_DESCRIPTION: GRAB BAR;INCREASED TIME;SUPERVISION FOR SAFETY;VERBAL CUEING
TOILETING: INDEPENDENT
BED_CHAIR_WHEELCHAIR_TRANSFER_DESCRIPTION: ADAPTIVE EQUIPMENT;INCREASED TIME;VERBAL CUEING
TOILET_TRANSFER_DESCRIPTION: GRAB BAR;INCREASED TIME;SET-UP OF EQUIPMENT;SUPERVISION FOR SAFETY;VERBAL CUEING
TOILET_TRANSFER_DESCRIPTION: GRAB BAR
TOILETING: INDEPENDENT
TOILETING_LEVEL_OF_ASSIST: REQUIRES SUPERVISION WITH TOILETING
TOILET_TRANSFER_DESCRIPTION: GRAB BAR;INCREASED TIME;SET-UP OF EQUIPMENT;SUPERVISION FOR SAFETY;VERBAL CUEING
TOILETING_LEVEL_OF_ASSIST_DESCRIPTION: GRAB BAR;INCREASED TIME;SET-UP OF EQUIPMENT;SUPERVISION FOR SAFETY;VERBAL CUEING
TOILETING: INDEPENDENT
TOILETING_LEVEL_OF_ASSIST_DESCRIPTION: GRAB BAR;INCREASED TIME;SUPERVISION FOR SAFETY;VERBAL CUEING
BED_CHAIR_WHEELCHAIR_TRANSFER_DESCRIPTION: ADAPTIVE EQUIPMENT;INCREASED TIME
TOILETING_LEVEL_OF_ASSIST_DESCRIPTION: GRAB BAR;INCREASED TIME;SUPERVISION FOR SAFETY;VERBAL CUEING
TOILET_TRANSFER_DESCRIPTION: GRAB BAR;INCREASED TIME;SET-UP OF EQUIPMENT;SUPERVISION FOR SAFETY
TUB_SHOWER_TRANSFER_DESCRIPTION: GRAB BAR;SHOWER BENCH;INCREASED TIME;SET-UP OF EQUIPMENT;SUPERVISION FOR SAFETY;VERBAL CUEING
TOILETING_LEVEL_OF_ASSIST_DESCRIPTION: GRAB BAR;INCREASED TIME;SET-UP OF EQUIPMENT;VERBAL CUEING;SUPERVISION FOR SAFETY
TOILETING_LEVEL_OF_ASSIST_DESCRIPTION: GRAB BAR;INCREASED TIME;SUPERVISION FOR SAFETY
TOILET_TRANSFER_DESCRIPTION: GRAB BAR;SET-UP OF EQUIPMENT;SUPERVISION FOR SAFETY;VERBAL CUEING
TUB_SHOWER_TRANSFER_DESCRIPTION: GRAB BAR;SET-UP OF EQUIPMENT;SUPERVISION FOR SAFETY;VERBAL CUEING
TOILETING: INDEPENDENT
TUB_SHOWER_TRANSFER_DESCRIPTION: GRAB BAR;SHOWER BENCH;INCREASED TIME;SET-UP OF EQUIPMENT;SUPERVISION FOR SAFETY
BED_CHAIR_WHEELCHAIR_TRANSFER_DESCRIPTION: INCREASED TIME;SET-UP OF EQUIPMENT;SUPERVISION FOR SAFETY;VERBAL CUEING
TUB_SHOWER_TRANSFER_DESCRIPTION: GRAB BAR
TOILET_TRANSFER_DESCRIPTION: GRAB BAR;INCREASED TIME;VERBAL CUEING;REQUIRES LIFT
TUB_SHOWER_TRANSFER_DESCRIPTION: GRAB BAR;SHOWER BENCH;INCREASED TIME;SET-UP OF EQUIPMENT;SUPERVISION FOR SAFETY;VERBAL CUEING
BED_CHAIR_WHEELCHAIR_TRANSFER_DESCRIPTION: ADAPTIVE EQUIPMENT;INCREASED TIME;VERBAL CUEING
BED_CHAIR_WHEELCHAIR_TRANSFER_DESCRIPTION: ADAPTIVE EQUIPMENT;INCREASED TIME;VERBAL CUEING;REQUIRES LIFT
TOILET_TRANSFER_DESCRIPTION: GRAB BAR;SET-UP OF EQUIPMENT;SUPERVISION FOR SAFETY;VERBAL CUEING
BED_CHAIR_WHEELCHAIR_TRANSFER_DESCRIPTION: ADAPTIVE EQUIPMENT;INCREASED TIME;REQUIRES LIFT;VERBAL CUEING
BED_CHAIR_WHEELCHAIR_TRANSFER_DESCRIPTION: ADAPTIVE EQUIPMENT;INCREASED TIME;SET-UP OF EQUIPMENT;SUPERVISION FOR SAFETY;VERBAL CUEING
TOILETING_LEVEL_OF_ASSIST_DESCRIPTION: GRAB BAR;INCREASED TIME;SUPERVISION FOR SAFETY

## 2021-01-01 ASSESSMENT — MINNESOTA LIVING WITH HEART FAILURE QUESTIONNAIRE (MLHF)
LOSS OF SELF CONTROL IN YOUR LIFE: 5
HAVING TO SIT OR LIE DOWN DURING THE DAY: 3
TOTAL_SCORE: 58
COSTING YOU MONEY FOR MEDICAL CARE: 0
GIVING YOU SIDE EFFECTS FROM TREATMENTS: 5
MAKING YOU WORRY: 5
MAKING YOU FEEL DEPRESSED: 5
DIFFICULTY GOING AWAY FROM HOME: 0
DIFFICULTY SOCIALIZING WITH FAMILY OR FRIENDS: 3
WORKING AROUND THE HOUSE OR YARD DIFFICULT: 0
DIFFICULTY SLEEPING WELL AT NIGHT: 3
DIFFICULTY WITH RECREATIONAL PASTIMES, SPORTS, HOBBIES: 0
MAKING YOU SHORT OF BREATH: 5
WALKING ABOUT OR CLIMBING STAIRS DIFFICULT: 0
TIRED, FATIGUED OR LOW ON ENERGY: 4
DIFFICULTY TO CONCENTRATE OR REMEMBERING THINGS: 5
FEELING LIKE A BURDEN TO FAMILY AND FRIENDS: 5
DIFFICULTY WITH SEXUAL ACTIVITIES: 0
MAKING YOU STAY IN A HOSPITAL: 5
SWELLING IN ANKLES OR LEGS: 0
EATING LESS FOODS YOU LIKE: 5
DIFFICULTY WORKING TO EARN A LIVING: 0

## 2021-01-01 ASSESSMENT — CHA2DS2 SCORE
SEX: MALE
DIABETES: NO
VASCULAR DISEASE: YES
HYPERTENSION: YES
CHA2DS2 VASC SCORE: 5
CHA2DS2 VASC SCORE: 4
AGE 75 OR GREATER: YES
CHF OR LEFT VENTRICULAR DYSFUNCTION: NO
DIABETES: YES
PRIOR STROKE OR TIA OR THROMBOEMBOLISM: NO
CHF OR LEFT VENTRICULAR DYSFUNCTION: YES
DIABETES: YES
PRIOR STROKE OR TIA OR THROMBOEMBOLISM: NO
VASCULAR DISEASE: YES
AGE 75 OR GREATER: YES
CHA2DS2 VASC SCORE: 5
DIABETES: YES
AGE 65 TO 74: NO
PRIOR STROKE OR TIA OR THROMBOEMBOLISM: NO
AGE 75 OR GREATER: YES
AGE 65 TO 74: NO
AGE 65 TO 74: NO
VASCULAR DISEASE: YES
SEX: MALE
PRIOR STROKE OR TIA OR THROMBOEMBOLISM: NO
CHF OR LEFT VENTRICULAR DYSFUNCTION: YES
HYPERTENSION: NO
HYPERTENSION: YES
CHA2DS2 VASC SCORE: 5
VASCULAR DISEASE: YES
SEX: MALE
AGE 75 OR GREATER: YES
HYPERTENSION: YES
VASCULAR DISEASE: YES
SEX: MALE
PRIOR STROKE OR TIA OR THROMBOEMBOLISM: NO
DIABETES: YES
AGE 75 OR GREATER: YES
SEX: MALE
AGE 65 TO 74: NO
HYPERTENSION: NO
CHA2DS2 VASC SCORE: 5
AGE 65 TO 74: NO

## 2021-01-01 ASSESSMENT — PAIN SCALES - WONG BAKER
WONGBAKER_NUMERICALRESPONSE: HURTS JUST A LITTLE BIT
WONGBAKER_NUMERICALRESPONSE: HURTS A LITTLE MORE

## 2021-01-01 ASSESSMENT — GAIT ASSESSMENTS
DEVIATION: STEP TO
GAIT LEVEL OF ASSIST: UNABLE TO PARTICIPATE
ASSISTIVE DEVICE: PARALLEL BARS
GAIT LEVEL OF ASSIST: UNABLE TO PARTICIPATE
DEVIATION: STEP TO
GAIT LEVEL OF ASSIST: MINIMAL ASSIST
GAIT LEVEL OF ASSIST: UNABLE TO PARTICIPATE
DISTANCE (FEET): 2
GAIT LEVEL OF ASSIST: UNABLE TO PARTICIPATE
DISTANCE (FEET): 5
DEVIATION: OTHER (COMMENT)
DEVIATION: NO DEVIATION
GAIT LEVEL OF ASSIST: MINIMAL ASSIST
DISTANCE (FEET): 10
GAIT LEVEL OF ASSIST: CONTACT GUARD ASSIST
GAIT LEVEL OF ASSIST: SUPERVISED
DEVIATION: STEP TO;BRADYKINETIC
ASSISTIVE DEVICE: FRONT WHEEL WALKER
GAIT LEVEL OF ASSIST: MAXIMAL ASSIST
ASSISTIVE DEVICE: PARALLEL BARS
ASSISTIVE DEVICE: FRONT WHEEL WALKER
GAIT LEVEL OF ASSIST: UNABLE TO PARTICIPATE
DISTANCE (FEET): 10
GAIT LEVEL OF ASSIST: MAXIMAL ASSIST
ASSISTIVE DEVICE: FRONT WHEEL WALKER
ASSISTIVE DEVICE: FRONT WHEEL WALKER
DISTANCE (FEET): 10
DISTANCE (FEET): 25
DEVIATION: OTHER (COMMENT)
GAIT LEVEL OF ASSIST: MINIMAL ASSIST
DISTANCE (FEET): 3
GAIT LEVEL OF ASSIST: UNABLE TO PARTICIPATE
ASSISTIVE DEVICE: FRONT WHEEL WALKER
GAIT LEVEL OF ASSIST: UNABLE TO PARTICIPATE
DISTANCE (FEET): 10
ASSISTIVE DEVICE: FRONT WHEEL WALKER
GAIT LEVEL OF ASSIST: MINIMAL ASSIST

## 2021-01-01 ASSESSMENT — VISUAL ACUITY: OU: 1

## 2021-01-01 ASSESSMENT — BRIEF INTERVIEW FOR MENTAL STATUS (BIMS)
WHAT DAY OF THE WEEK IS IT: CORRECT
ASKED TO RECALL BED: YES, NO CUE REQUIRED
ASKED TO RECALL SOCK: YES, NO CUE REQUIRED
BIMS SUMMARY SCORE: 15
ASKED TO RECALL BLUE: YES, NO CUE REQUIRED
INITIAL REPETITION OF BED BLUE SOCK - FIRST ATTEMPT: 3
WHAT DAY OF THE WEEK IS IT: CORRECT
INITIAL REPETITION OF BED BLUE SOCK - FIRST ATTEMPT: 3
ASKED TO RECALL BLUE: YES, NO CUE REQUIRED
WHAT MONTH IS IT: ACCURATE WITHIN 5 DAYS
WHAT YEAR IS IT: CORRECT
ASKED TO RECALL BED: YES, AFTER CUEING (A PIECE OF FURNITURE")"
ASKED TO RECALL SOCK: YES, NO CUE REQUIRED
WHAT MONTH IS IT: ACCURATE WITHIN 5 DAYS
WHAT YEAR IS IT: CORRECT
BIMS SUMMARY SCORE: 14

## 2021-01-01 ASSESSMENT — PAIN SCALES - GENERAL
PAIN_LEVEL: 3
PAIN_LEVEL: 0

## 2021-01-01 NOTE — ASSESSMENT & PLAN NOTE
Right below knee amputation. 12/31/2020  Pain control - scheduled Tylenol, Oxycodone prn  PT and OT

## 2021-01-01 NOTE — PROGRESS NOTES
Pt complaints of discomfort in throat feeling like medication got stuck in throat. Pt is able to take down food and liquids, O2 sats remain stable.    Pt down to dialysis with transport. Medicated for pain.

## 2021-01-01 NOTE — OP REPORT
DATE OF OPERATION: 12/31/2020     PREOPERATIVE DIAGNOSIS: right lower extremity osteomyelitis    POSTOPERATIVE DIAGNOSIS: Same    PROCEDURE PERFORMED: right below knee amputation    SURGEON: Leobardo Lynn M.D.    ANESTHESIOLOGIST: Souleymane Salinas MD.     ANESTHESIA: General  INDICATIONS: The patient is a 75 y.o.-year-old male who presents with a dysvascular lower extremity with gangrene.  Given their radiographic and exam findings, the patient is an appropriately indicated candidate for right below knee amputation.  I discussed the risks, benefits, and alternatives of surgery with the patient.  Risks discussed included failure to clear the infection, wound healing complication, need for additional surgery including more proximal amputation, neurovascular injury, blood loss, phantom limb sensation or pain, DVT/PE, and the medical risks of anesthesia (including stroke, MI, and death).  Benefits discussed included improved chance of clearance of the infection and improved function.  The patient is in agreement with the plan to proceed, and their informed consent was signed and documented in the medical record.  I met with the patient pre-operatively and marked the operative extremity with their agreement.  He was taken to the operating room.    FINDINGS: Non-viable lower extremity    SPECIMEN: None    ESTIMATED BLOOD LOSS: Minimal    COMPLICATIONS: None    PROCEDURE: The patient underwent general anesthesia, and was positioned supine with all bony prominences well padded.  A well padded non-sterile tourniquet was applied the the operative extremity, and the operative extremity was prepped and draped in sterile orthopaedic fashion, using ChloraPrep.  The surgical team scrubbed in, and a procedural pause was conducted to verify correct patient, correct extremity, presence of the surgeons initials on the operative extremity, and administration of IV antibiotics, in this case, Ancef.    Following generalized agreement, the  tourniquet was inflated, and a standard below knee amputation flap was drawn.  The incision was then made, and we dissected through fascia sharply.  The anterior and lateral compartment musculature was then divided with cautery, and the anterior vascular bundle was ligated.  A traction neurolysis was performed on the nerve.    We then elevated the periosteum of the tibia proximally, and performed a transverse osteotomy of the tibia using the saw.  A napkin ring osteotomy of the fibula was performed with the saw, making our proximal cut just proximal to the tibial cut.  The amputation was then completed, and the posterior flap contoured using the amputation knife.  The residual limb was passed off.  The anterior aspect of the tibia was beveled using the saw.    We then dissected out the posterior neurovascular bundle, and ligated the vessels.  A traction neurolysis was performed on the nerve, as well as the sural nerve.  The posterior flap was debulked as needed, and the tourniquet deflated.  Hemostasis was obtained with cautery.  The wound was then irrigated with copious amounts of normal saline.  The wound was then closed over a hemovac drain, with #1 Vicryl in the fascia, a few 2-0 Vicryl in the subcutaneous tissue, and 2-0 Nylon in the skin.  Negative pressure incisional dressing was placed with positive seal.  Sterile dressings and a knee immobilizer were applied, and the patient was transported to the recovery room in stable condition, suffering no complications.  All counts were correct.    The use of Dr. Sukh Aguero MD, as a surgical assistant was necessary for assistance with amputation and closure.    Post-Operative Plan:    1.  NWB operative extremity, knee immobilizer when at rest  2.  DVT prophylaxis - SCD's, chemical per medicine  3.  Remove drain when output < 30 mL/24 hours  4.  Antibiotics - Per ID recs  5.  Discharge planning     ____________________________________   Leobardo Lynn M.D.    DD:  12/31/2020  4:54 PM

## 2021-01-01 NOTE — PROGRESS NOTES
Assessment complete.  A&O x 4. Patient calls appropriately.  PT/OT to see pt today.  Patient has 5/10 pain. Pain managed with prescribed medications.  Denies N&V. Tolerating renal ADA diet.  R BKA with dressing and immobilizer in place. PCIO and Hemovac in place  AV fistula to LUE with bruit and thrill present.   R chest dialysis port.   + void, + flatus, 12/31 BM.  Patient denies SOB.  SCD's on L ronan.  Review plan with of care with patient. Call light and personal belongings with in reach. Hourly rounding in place. All needs met at this time.

## 2021-01-01 NOTE — ANESTHESIA QCDR
2019 [facility] Clinical Data Registry (for Quality Improvement)     Postoperative nausea/vomiting risk protocol (Adult = 18 yrs and Pediatric 3-17 yrs)- (430 and 463)  General inhalation anesthetic (NOT TIVA) with PONV risk factors: Yes  Provision of anti-emetic therapy with at least 2 different classes of agents: Yes   Patient DID NOT receive anti-emetic therapy and reason is documented in Medical Record:  N/A    Multimodal Pain Management- (477)  Non-emergent surgery AND patient age >= 18:   Use of Multimodal Pain Management, two or more drugs and/or interventions, NOT including systemic opioids:   Exception: Documented allergy to multiple classes of analgesics:     Smoking Abstinence (404)  Patient is current smoker (cigarette, pipe, e-cig, marijuanna):   Elective Surgery:   Abstinence instructions provided prior to day of surgery:   Patient abstained from smoking on day of surgery:     Pre-Op Beta-Blocker in Isolated CABG (44)  Isolated CABG AND patient age >= 18:   Beta-blocker admin within 24 hours of surgical incision:   Exception:of medical reason(s) for not administering beta blocker within 24 hours prior to surgical incision (e.g., not  indicated,other medical reason):     PACU assessment of acute postoperative pain prior to Anesthesia Care End- Applies to Patients Age = 18- (ABG7)  Initial PACU pain score is which of the following: < 7/10  Patient unable to report pain score: N/A    Post-anesthetic transfer of care checklist/protocol to PACU/ICU- (426 and 427)  Upon conclusion of case, patient transferred to which of the following locations: PACU/Non-ICU  Use of transfer checklist/protocol:   Exclusion: Service Performed in Patient Hospital Room (and thus did not require transfer):   Unplanned admission to ICU related to anesthesia service up through end of PACU care- (MD51)  Unplanned admission to ICU (not initially anticipated at anesthesia start time): No

## 2021-01-01 NOTE — THERAPY
Occupational Therapy   Initial Evaluation     Patient Name: Luis Sepulveda  Age:  75 y.o., Sex:  male  Medical Record #: [id number]  Today's Date: 1/1/2021     Precautions  Precautions: (P) Non Weight Bearing Right Lower Extremity, Immobilizer Right Lower Extremity    Assessment  Patient is 75 y.o. male with a diagnosis of R BKA.  Additional factors influencing patient status / progress: good family support available at home.      Plan    Recommend Occupational Therapy 3 times per week until therapy goals are met for the following treatments:  Adaptive Equipment, Self Care/Activities of Daily Living, Therapeutic Activities and Therapeutic Exercises.    DC Equipment Recommendations: (P) Unable to determine at this time  Discharge Recommendations: (P) Recommend home health for continued occupational therapy services     Subjective    Pleasant and cooperative. Anxious and tearful at times     Objective       01/01/21 0825   Total Time Spent   Total Time Spent (Mins) 42   Charge Group   OT Evaluation OT Evaluation Mod   Initial Contact Note    Initial Contact Note Order Received and Verified, Occupational Therapy Evaluation in Progress with Full Report to Follow.   Prior Living Situation   Prior Services None   Housing / Facility 1 Story House   Steps Into Home 0   Steps In Home 0   Bathroom Set up Walk In Shower  (uses walk in shower in ex spouses room)   Equipment Owned Wheelchair  (had been scooting around house in  for past 2 weeks)   Lives with - Patient's Self Care Capacity Unrelated Adult  (ex wife. local son staying with him short term)   Prior Level of ADL Function   Self Feeding Independent   Grooming / Hygiene Independent   Bathing Independent   Dressing Independent   Toileting Independent   Prior Level of IADL Function   Medication Management Independent   Laundry Independent   Kitchen Mobility Independent   Finances Independent   Home Management Independent   Shopping Independent   Prior Level Of  Mobility Uses Wheel Chair for in Home Mobility   Driving / Transportation Relatives / Others Provide Transportation  (ex wife drives)   Precautions   Precautions Non Weight Bearing Right Lower Extremity;Immobilizer Right Lower Extremity   Vitals   O2 (LPM) 1   O2 Delivery Device Nasal Cannula   Pain 0 - 10 Group   Therapist Pain Assessment Post Activity Pain Same as Prior to Activity;Nurse Notified  (RN in to medicate with morphine while seate EOB)   Cognition    Level of Consciousness Alert   Comments anxious, occasionally tearful throughout, easily redirected   Passive ROM Upper Body   Passive ROM Upper Body WDL   Active ROM Upper Body   Active ROM Upper Body  WDL   Dominant Hand Right   Strength Upper Body   Upper Body Strength  WDL   Sensation Upper Body   Upper Extremity Sensation  WDL   Upper Body Muscle Tone   Upper Body Muscle Tone  WDL   Coordination Upper Body   Coordination WDL   Balance Assessment   Sitting Balance (Static) Fair   Sitting Balance (Dynamic) Fair -   Standing Balance (Static)   (deferred, mild dizzyness)   Weight Shift Sitting Fair   Comments O2 sats stable throughout   Bed Mobility    Supine to Sit Supervised   Sit to Supine Supervised   Scooting Supervised   Rolling Supervised   ADL Assessment   Eating Supervision   Grooming Supervision;Seated   Bathing Minimal Assist  (seated EOB)   Upper Body Dressing Supervision   Toileting Maximal Assist   How much help from another person does the patient currently need...   Putting on and taking off regular lower body clothing? 2   Bathing (including washing, rinsing, and drying)? 2   Toileting, which includes using a toilet, bedpan, or urinal? 2   Putting on and taking off regular upper body clothing? 3   Taking care of personal grooming such as brushing teeth? 4   Eating meals? 4   6 Clicks Daily Activity Score 17   Functional Mobility   Sit to Stand Unable to Participate   Visual Perception   Visual Perception  WDL   Activity Tolerance   Sitting  Edge of Bed 15 mins   Patient / Family Goals   Patient / Family Goal #1 go home   Short Term Goals   Short Term Goal # 1 SBA necessary functional transfers   Short Term Goal # 2 stand at sink with supervision to complete grooming, light ADL tasks   Short Term Goal # 3 tolerate 10 minutes of continuous standing ADL activity, prior to resting   Short Term Goal # 4 distant supervision for toileting   Education Group   Role of Occupational Therapist Patient Response Patient;Acceptance;Explanation;Demonstration;Reinforcement Needed   Problem List   Problem List Decreased Active Daily Living Skills;Decreased Functional Mobility;Decreased Activity Tolerance;Limited Knowledge of Post Op Precautions   Anticipated Discharge Equipment and Recommendations   DC Equipment Recommendations Unable to determine at this time   Discharge Recommendations Recommend home health for continued occupational therapy services   Interdisciplinary Plan of Care Collaboration   IDT Collaboration with  Nursing;Certified Nursing Assistant   Patient Position at End of Therapy In Bed;Call Light within Reach;Tray Table within Reach;Phone within Reach   Collaboration Comments report given   Session Information   Date / Session Number  1/1,1( 1/3,1/7)   Priority 2

## 2021-01-01 NOTE — PROGRESS NOTES
Patient arrived up to unit from PACU with belongings at bedside    AAOx4  2L oxygen  Immobilizer to Right Knee  Denies pain at this time  +void  Hemovac to RLE  R MATHEW drain  Denies n/v/d    AV fistula to LUE  R chest dialysis port    POC reviewed, educaiton provided

## 2021-01-01 NOTE — PROGRESS NOTES
[facility] Medicine Daily Progress Note    Date of Service  1/1/2021    Chief Complaint  75 y.o. male admitted 12/31/2020 with planned right BKA.    Hospital Course  75 y.o. male with DM2, ESRD on HD, PAD, CAD s/p LAD stent, Hyperthyroidism, pAfib on Eliquis, and HLD who presented 12/31/2020 here for a planned right BKA. He was discharged on 11/19 after a right great toe ray amputation on 11/16 for a nonhealing wound. He was being followed up outpatient and when his amputation site wasn't healing secondary to PAD, it was decided that the best course of action would be a BKA.      Today, he is with his wife in pre-op and in no acute distress. He did have his HD session this morning. Endorses some dizziness but thinks it's 2/2 not eating today and having HD. Gentle IVF currently running while he is in pre-op.     Interval Problem Update  S/P BKA     Consultants/Specialty  Ortho  nephrology    Code Status  Full Code    Disposition  pending    Review of Systems  Review of Systems   Constitutional: Negative for chills, diaphoresis and fever.   HENT: Negative for ear discharge, ear pain and tinnitus.    Eyes: Negative for blurred vision, double vision and photophobia.   Respiratory: Negative for cough, hemoptysis and sputum production.    Cardiovascular: Negative for chest pain, palpitations and orthopnea.   Gastrointestinal: Negative for heartburn, nausea and vomiting.   Genitourinary: Negative for dysuria, frequency and urgency.   Musculoskeletal: Positive for joint pain (right stump).   Skin: Negative for itching.   Neurological: Negative for dizziness, tingling and headaches.        Physical Exam  Temp:  [36.1 °C (97 °F)-37.4 °C (99.4 °F)] 36.7 °C (98.1 °F)  Pulse:  [] 94  Resp:  [14-23] 20  BP: ()/(36-54) 122/53  SpO2:  [93 %-100 %] 100 %    Physical Exam  Constitutional:       Appearance: Normal appearance.   HENT:      Head: Normocephalic and atraumatic.      Nose: Nose normal.      Mouth/Throat:       Mouth: Mucous membranes are dry.   Eyes:      Extraocular Movements: Extraocular movements intact.      Pupils: Pupils are equal, round, and reactive to light.   Neck:      Musculoskeletal: Normal range of motion and neck supple.   Cardiovascular:      Rate and Rhythm: Normal rate and regular rhythm.      Pulses: Normal pulses.      Heart sounds: Normal heart sounds.   Pulmonary:      Effort: Pulmonary effort is normal.      Breath sounds: Normal breath sounds.   Abdominal:      General: Bowel sounds are normal.      Palpations: Abdomen is soft.   Musculoskeletal:      Comments: Dressing looks clean on the right stump   Skin:     General: Skin is warm and dry.   Neurological:      General: No focal deficit present.      Mental Status: He is alert and oriented to person, place, and time.         Fluids    Intake/Output Summary (Last 24 hours) at 1/1/2021 0940  Last data filed at 1/1/2021 0719  Gross per 24 hour   Intake --   Output 85 ml   Net -85 ml       Laboratory      Recent Labs     12/31/20  1510   POTASSIUM 3.8                   Imaging       Assessment/Plan  * Wound of right foot- (present on admission)  Assessment & Plan  Nonhealing incision s/p right great toe ray amputation in Nov 2020.   - s/p  BKA   - Orthopedic surgery finput is noted  - pain control  - post operative ancef  - PT/OT to evaluate    Peripheral vascular disease (HCC)- (present on admission)  Assessment & Plan  Complicating wound healing. Continue to reduce risk factors.  - s/p angiogram and thrombectomy in April 2020  -resume eliquis once permitted by ortho    End stage renal disease on dialysis (HCC)- (present on admission)  Assessment & Plan  On HD- TTS  - consulted [facility] Nephrolog for maintenance HD  - renal/DM diet  - continue phos binder  - EPO per nephro    Type 2 diabetes mellitus (HCC)- (present on admission)  Assessment & Plan  With hyperglycemia, A1C 7.8%.   - lantus 5 U nightly  - DM/renal diet and hypoglycemia  protocol  - sensitive sliding scale q6 hrs as he's currently NPO for the surgery    Paroxysmal A-fib (HCC)- (present on admission)  Assessment & Plan  Rate controlled  - coreg  - resume home eliquis when ok'd by surgery    Dyslipidemia- (present on admission)  Assessment & Plan  Reports that he hasn't been taking his atorvastatin. Lipid panel from 11/14 showed elevated TAGs and LDL at 85  - resume atorvastatin    Essential hypertension- (present on admission)  Assessment & Plan  Normotensive.   - resume home coreg with holding parameters    Hyperthyroidism- (present on admission)  Assessment & Plan  - resume home methimazole       VTE prophylaxis:

## 2021-01-01 NOTE — ANESTHESIA PROCEDURE NOTES
Airway    Date/Time: 12/31/2020 4:04 PM  Performed by: Souleymane Salinas M.D.  Authorized by: Souleymane Salinas M.D.     Location:  OR  Urgency:  Elective  Indications for Airway Management:  Anesthesia      Spontaneous Ventilation: absent    Sedation Level:  Deep  Preoxygenated: Yes    Final Airway Type:  Supraglottic airway  Final Supraglottic Airway:  Standard LMA    SGA Size:  4  Number of Attempts at Approach:  1

## 2021-01-01 NOTE — ANESTHESIA PROCEDURE NOTES
Peripheral Block    Date/Time: 12/31/2020 4:05 PM  Performed by: Souleymane Salinas M.D.  Authorized by: Souleymane Salinas M.D.     Patient Location:  OR  Start Time:  12/31/2020 4:05 PM  End Time:  12/31/2020 4:07 PM  Reason for Block: at surgeon's request and post-op pain management ONLY    patient identified, IV checked, site marked, risks and benefits discussed, surgical consent, monitors and equipment checked, pre-op evaluation and timeout performed    Patient Position:  Supine  Prep: ChloraPrep    Monitoring:  Heart rate, continuous pulse ox and cardiac monitor  Block Region:  Lower Extremity  Lower Extremity - Block Type:  Selective FEMORAL nerve block at the Adductor Canal and SCIATIC nerve block, lateral approach    Laterality:  Right  Procedures: ultrasound guided  Image captured, interpreted and electronically stored.  Local Infiltration:  Lidocaine  Strength:  1 %  Dose:  3 ml  Block Type:  Single-shot  Needle Length:  100mm  Needle Gauge:  21 G  Needle Localization:  Ultrasound guidance  Injection Assessment:  Negative aspiration for heme, no paresthesia on injection, incremental injection and local visualized surrounding nerve on ultrasound  Evidence of intravascular injection: No     Ultrasound images saved in chart, Adductor canal and Sciatic

## 2021-01-01 NOTE — PROGRESS NOTES
Orthopaedic Inpatient Progress Note    Subjective:  No acute events overnight.  Pain moderately controlled.  Patient states that the pain meds help somewhat, but he continues to have a deep throbbing pain in the stump.  No fevers/chills.    Objective:    Vital Signs:     Wt Readings from Last 1 Encounters:   12/31/20 78.3 kg (172 lb 9.9 oz)       Intake/Output (Yesterday):  12/31 0700 - 01/01 0659  In: -   Out: 75 [Drains:20]    Physical Exam:  General: Well appearing, no acute distress  RLE: Drain intact (20mL of serosanguinous output overnight), dressing c/d/i, compartments soft and compressible, knee immobilizer in place    Assessment: 75 y.o. Male s/p R BKA on 12/31/20    Plan:  -NWB RLE  -continue knee immobilizer to RLE  -FWW to mobilize  -PT/OT  -DC hemovac tomorrow (1/2/21)  -turn off MATHEW on POD#7  -pain control  -follow up with Dr. Lynn in 2 weeks  -ok to DC home from ortho perspective when stable per medicine       Joanne England MD

## 2021-01-01 NOTE — THERAPY
Physical Therapy   Initial Evaluation     Patient Name: Luis Sepulveda  Age:  75 y.o., Sex:  male  Medical Record #: [id number]  Today's Date: 1/1/2021     Precautions: Fall Risk, Non Weight Bearing Right Lower Extremity, Immobilizer Right Lower Extremity    Assessment  Patient is 75 y.o. male with a diagnosis of R  BKA.  Additional factors influencing patient status / progress :pt was found in bed, calling out for help, anxious that he felt pills in throat. Pt able to calm down enough to participate and followed cues well for OOB and took a few hops and pivoted to chair with min A. Pt lives with ex-wife with son staying to help. PT to follow. Pt will need a FWW for use at home, already owns a w/c. .      Plan    Recommend Physical Therapy 3 times per week until therapy goals are met for the following treatments:  Bed Mobility, Gait Training, Neuro Re-Education / Balance and Therapeutic Activities    DC Equipment Recommendations: Front-Wheel Walker  Discharge Recommendations: Recommend home health for continued physical therapy services(depending on how much help family can provide)          Objective       01/01/21 1321   Prior Living Situation   Prior Services None   Housing / Facility [address]   Steps Into Home 0   Steps In Home 0   Elevator No   Equipment Owned Wheelchair   Lives with - Patient's Self Care Capacity Other (Comments)  (staying at Ex wife's house, son staying to help.)   Prior Level of Functional Mobility   Bed Mobility Independent   Transfer Status Independent   Assistive Devices Used Wheelchair   Wheelchair Independent   Gait Analysis   Gait Level Of Assist Minimal Assist   Assistive Device Front Wheel Walker   Distance (Feet) 2  (hop and pivot on L heel)   # of Times Distance was Traveled 1   Bed Mobility    Supine to Sit Supervised   Sit to Supine Supervised   Scooting Supervised   Rolling Supervised   Functional Mobility   Sit to Stand Supervised   Bed, Chair, Wheelchair Transfer  Supervised   Short Term Goals    Short Term Goal # 1 pt will perform bed mobility with flat bed, no rail in 6 visits.    Short Term Goal # 2 Pt will transfer with mod indep in 6 visits to improve functional indep.    Short Term Goal # 3 Pt will ambulate x 50 feet using fWW with supervision in 6 viits.    Short Term Goal # 4 Pt will show independent understanding of therex for BKA in 6 visits.    Anticipated Discharge Equipment and Recommendations   DC Equipment Recommendations Front-Wheel Walker   Discharge Recommendations Recommend home health for continued physical therapy services  (depending on how much help family can provide)

## 2021-01-01 NOTE — PROGRESS NOTES
[facility] Services Progress Note     Hemodialysis treatment ordered today per Dr. Mendoza x 2 hours.   Treatment initiated at 1337 ended at 1537.      Patient tolerated treatment; see paper flow sheet for details.      Net UF 1600 mL.    Limited by BP at S90's with light headedness improved with decreased UF    Post tx, CVC flushed with saline then locked with heparin 1000 units/mL per designated amount in each wing then clamped and capped. Aspirate heparin prior to next CVC use.     Report given to Primary RN JULIEN Padilla.

## 2021-01-01 NOTE — OR NURSING
Room assigned, awaiting ready clean room. Report called to JAN Teixeira.  PACU handoff report given to JAN Nicole.

## 2021-01-01 NOTE — ANESTHESIA TIME REPORT
Anesthesia Start and Stop Event Times     Date Time Event    12/31/2020 1552 Ready for Procedure     1602 Anesthesia Start     1729 Anesthesia Stop        Responsible Staff  12/31/20    Name Role Begin End    Souleymane Salinas M.D. Anesth 1602 1729        Preop Diagnosis (Free Text):  Pre-op Diagnosis     PAIN OF TOE OF RIGHT FOOT, DIABETIC FOOT ULCER GREAT TOE        Preop Diagnosis (Codes):    Post op Diagnosis  Below knee amputation (HCC)      Premium Reason  A. 3PM - 7AM    Comments: Sciatic Block and Adductor in OR, PREMIUM NAM

## 2021-01-02 NOTE — DISCHARGE PLANNING
Received Choice form at 3994  Agency/Facility Name: [facility]  Referral sent per Choice form @ 3240

## 2021-01-02 NOTE — PROGRESS NOTES
"BP (!) 78/38   Pulse 100   Temp 37.1 °C (98.7 °F) (Temporal)   Resp 18   Ht 1.676 m (5' 6\")   Wt 78.3 kg (172 lb 9.9 oz)   SpO2 97%   BMI 27.86 kg/m²     Dr. Esteves notified.   "

## 2021-01-02 NOTE — PROGRESS NOTES
[facility] Medicine Daily Progress Note    Date of Service  1/2/2021    Chief Complaint  75 y.o. male admitted 12/31/2020 with planned right BKA.    Hospital Course  75 y.o. male with DM2, ESRD on HD, PAD, CAD s/p LAD stent, Hyperthyroidism, pAfib on Eliquis, and HLD who presented 12/31/2020 here for a planned right BKA. He was discharged on 11/19 after a right great toe ray amputation on 11/16 for a nonhealing wound. He was being followed up outpatient and when his amputation site wasn't healing secondary to PAD, it was decided that the best course of action would be a BKA.      Today, he is with his wife in pre-op and in no acute distress. He did have his HD session this morning. Endorses some dizziness but thinks it's 2/2 not eating today and having HD. Gentle IVF currently running while he is in pre-op.     Interval Problem Update  S/P BKA     Consultants/Specialty  Ortho  nephrology    Code Status  Full Code    Disposition  pending    Review of Systems  Review of Systems   Constitutional: Negative for chills, diaphoresis and malaise/fatigue.   HENT: Negative for ear discharge, ear pain and nosebleeds.    Eyes: Negative for double vision, photophobia and pain.   Respiratory: Negative for hemoptysis, sputum production and shortness of breath.    Cardiovascular: Negative for palpitations, orthopnea and claudication.   Gastrointestinal: Negative for abdominal pain, nausea and vomiting.   Genitourinary: Negative for frequency, hematuria and urgency.   Musculoskeletal: Positive for joint pain (right stump).   Skin: Negative for rash.   Neurological: Negative for tingling, tremors and headaches.        Physical Exam  Temp:  [36 °C (96.8 °F)-37 °C (98.6 °F)] 36.6 °C (97.8 °F)  Pulse:  [] 100  Resp:  [16-18] 18  BP: (107-148)/(48-71) 125/61  SpO2:  [98 %-100 %] 98 %    Physical Exam  Constitutional:       General: He is not in acute distress.     Appearance: He is not toxic-appearing.   HENT:      Head:  Normocephalic and atraumatic.      Nose: No congestion or rhinorrhea.      Mouth/Throat:      Mouth: Mucous membranes are dry.   Eyes:      Conjunctiva/sclera: Conjunctivae normal.      Pupils: Pupils are equal, round, and reactive to light.   Neck:      Musculoskeletal: No neck rigidity or muscular tenderness.   Cardiovascular:      Rate and Rhythm: Normal rate and regular rhythm.      Heart sounds: No murmur. No friction rub.   Pulmonary:      Effort: No respiratory distress.      Breath sounds: No stridor.   Abdominal:      General: There is no distension.      Palpations: Abdomen is soft.      Tenderness: There is no abdominal tenderness. There is no guarding.   Musculoskeletal:      Comments: Dressing looks clean on the right stump   Skin:     Coloration: Skin is not jaundiced or pale.   Neurological:      General: No focal deficit present.      Mental Status: He is alert. Mental status is at baseline.         Fluids    Intake/Output Summary (Last 24 hours) at 1/2/2021 1106  Last data filed at 1/2/2021 0800  Gross per 24 hour   Intake 560 ml   Output 2470 ml   Net -1910 ml       Laboratory  Recent Labs     01/01/21  1256   WBC 8.4   RBC 2.31*   HEMOGLOBIN 7.6*   HEMATOCRIT 23.2*   .4*   MCH 32.9   MCHC 32.8*   RDW 57.8*   PLATELETCT 352   MPV 8.6*     Recent Labs     12/31/20  1510 01/01/21  1256   SODIUM  --  131*   POTASSIUM 3.8 4.2   CHLORIDE  --  90*   CO2  --  25   GLUCOSE  --  128*   BUN  --  26*   CREATININE  --  5.37*   CALCIUM  --  9.0                   Imaging       Assessment/Plan  * Wound of right foot- (present on admission)  Assessment & Plan  Nonhealing incision s/p right great toe ray amputation in Nov 2020.   - s/p  BKA   - Orthopedic surgery finput is noted  - pain is better  Controlled  -pain is better controlled  - PT/OT inputs are noted  Home with     Peripheral vascular disease (HCC)- (present on admission)  Assessment & Plan  Complicating wound healing. Continue to reduce risk  factors.  - s/p angiogram and thrombectomy in April 2020  -resume eliquis once permitted by ortho    End stage renal disease on dialysis (HCC)- (present on admission)  Assessment & Plan  On HD- TTS  - cnephrology input is noted  - renal/DM diet  - continue phos binder  - EPO per nephro    Type 2 diabetes mellitus (HCC)- (present on admission)  Assessment & Plan  With hyperglycemia, A1C 7.8%.   - lantus 5 U nightly  - DM/renal diet and hypoglycemia protocol  - sensitive sliding scale q6 hrs as he's currently NPO for the surgery    Paroxysmal A-fib ([facility])- (present on admission)  Assessment & Plan  Rate controlled  - coreg  - resume home eliquis when ok'd by surgery    Dyslipidemia- (present on admission)  Assessment & Plan  Reports that he hasn't been taking his atorvastatin. Lipid panel from 11/14 showed elevated TAGs and LDL at 85  - resume atorvastatin    Essential hypertension- (present on admission)  Assessment & Plan  Normotensive.   - resume home coreg with holding parameters    Hyperthyroidism- (present on admission)  Assessment & Plan  - resume home methimazole       VTE prophylaxis:

## 2021-01-02 NOTE — DISCHARGE PLANNING
Anticipated Discharge Disposition: TBD    Action: LSW met with patient at bedside and confirmed information on the facesheet. Patient reports that he lives in a one story house with his ex-wife and adult son. Pt reports that they help with transportation. Pt reports that he was on service with [facility] before his admit. Pt reports that exwife borrowed a wheelchair from her job when the pain got too severe.    LSW explained that HH is being ordered. Pt reports that he would like to go to a rehab facility because he wants to get better as soon as hr can and he doesn't think that HH will help. LSW voalte messaged Dr. Esteves and informed her    Barriers to Discharge: Pt's HH choice    Plan: LSW to follow up with patient on HH choice

## 2021-01-02 NOTE — PROGRESS NOTES
"Away from room in dialysis on rounds.    BP (!) 78/38   Pulse 100   Temp 37.1 °C (98.7 °F) (Temporal)   Resp 18   Ht 1.676 m (5' 6\")   Wt 78.3 kg (172 lb 9.9 oz)   SpO2 97%     Exam:  Deferred    #1 Right BKA    Plan:  - NWB RLE, knee immobilizer at rest  - SCD's, chemical prophylaxis per medicine  - Remove drain when output < 30 mL/24 hrs  - D/C planning  "

## 2021-01-02 NOTE — PROGRESS NOTES
3 1/2 hr HD started @ 0830 and completed @ 1204,net UF = 1500ml only limited by BP.Asymptomatic,LUAAVF + B/T,cannulation sites covered with DD,CDI,report given to Heidi Yang RN.

## 2021-01-02 NOTE — FACE TO FACE
Face to Face Supporting Documentation - Home Health    The encounter with this patient was in whole or in part the primary reason for home health admission.    Date of encounter:   Patient:                    MRN:                       YOB: 2023  Luis Sepulveda  [id number]  1945     Home health to see patient for:  Skilled Nursing care for assessment, interventions & education    Skilled need for:  Surgical Aftercare right leg amputation    Skilled nursing interventions to include:  Wound Care    Homebound status evidenced by:  Needs the assistance of another person in order to leave the home. Leaving home requires a considerable and taxing effort. There is a normal inability to leave the home.    Community Physician to provide follow up care: Patito Edgar M.D.     Optional Interventions? No      I certify the face to face encounter for this home health care referral meets the CMS requirements and the encounter/clinical assessment with the patient was, in whole, or in part, for the medical condition(s) listed above, which is the primary reason for home health care. Based on my clinical findings: the service(s) are medically necessary, support the need for home health care, and the homebound criteria are met.  I certify that this patient has had a face to face encounter by myself.  RYLIE Esteves M.D. - NPI: [id number]

## 2021-01-02 NOTE — PROGRESS NOTES
[facility] -  PROGRESS NOTE               Author: Lane Ortega M.D. Date & Time: 1/2/2021  7:23 AM     HPI:   A 75-year-old male with end-stage renal disease   on chronic hemodialysis Tuesdays, Thursdays and Saturdays.  The patient was   admitted on 12/31 for right below-the-knee amputation.  He had wound and   gangrene of the right foot.  He has had some peripheral vascular procedures   previously.  He did have a right ray amputation in 11/2020.  He has had a   nonhealing wound and he was admitted for right below-the-knee amputation that   was performed on 12/31.  We have been consulted to assist in his dialysis   needs.  He had dialysis on 12/31 prior to admission.  Today, he is feeling   dyspneic.  He has no cough or sputum production.    DAILY NEPHROLOGY SUMMARY:  1/1 - Consult seen  1/2 - Tolerated HD yesterday via TDC    REVIEW OF SYSTEMS:    10 point ROS reviewed and is as per HPI/daily summary or otherwise negative    PMH/PSH/SH/FH: Reviewed and unchanged since admission note  CURRENT MEDICATIONS: Reviewed from admission to present day    PHYSICAL EXAM:  Physical Exam   Constitutional: He appears well-developed and well-nourished.   HENT:   Head: Normocephalic.   Eyes: Pupils are equal, round, and reactive to light.   Neck: Normal range of motion.   Cardiovascular: Normal rate, regular rhythm and normal heart sounds.   Pulmonary/Chest: Effort normal. He has rales.   Abdominal: He exhibits no distension. There is no abdominal tenderness.   Musculoskeletal:         General: No edema.       Fluids:  In: 920 [P.O.:420; Dialysis:500]  Out: 2140     LABS:  Recent Labs     12/31/20  1510 01/01/21  1256   SODIUM  --  131*   POTASSIUM 3.8 4.2   CHLORIDE  --  90*   CO2  --  25   GLUCOSE  --  128*   BUN  --  26*   CREATININE  --  5.37*   CALCIUM  --  9.0       IMPRESSION:  1.  End-stage renal disease, on chronic hemodialysis Tuesday, Thursday, and   Saturday.  2.  Dyspnea.  I suspect the  patient may have some element of fluid overload.    3.  Status post right below-the-knee amputation on 12/31/2020.  4.  Hypertension, controlled.  5.  Anemia of chronic kidney disease.  6.  Diabetes mellitus.  7.  Chronic kidney disease - metabolic bone disorder.  Managed at the dialysis   unit.  8.  Peripheral vascular disease.  9.  Paroxysmal atrial fibrillation, on Eliquis.  10.  Coronary artery disease, status post stents.  Asymptomatic.  11.  History of hyperthyroidism.  12.  Dyslipidemia, on statins.  13.  Past history of heavy tobacco use.     PLAN:  1.  Will plan another short HD session today via AVF   - Ultrasound AVF  2.  We will continue his chronic dialysis otherwise on Tuesday, Thursday and   Saturday.  We will dialyze him tomorrow again.  He dialyzes for 3-1/2 hours.  3.  We are going to ultrafiltrate as tolerated.  4.  We are going to check basic metabolic panel today prior to dialysis.  5.  Continue home medicines.  6.  No dietary protein restrictions.  7.  Adjust all of his medicines for intermittent HD    Thanks for this consultation.  We will follow with you.

## 2021-01-02 NOTE — CONSULTS
DATE OF SERVICE:  01/01/2021     REQUESTING PHYSICIAN: Lissy Esteves MD.     CONSULTING PHYSICIAN:  Jordin Mendoza MD.     REASON FOR CONSULTATION:  Patient with end-stage renal disease, admitted   for right below-the-knee amputation.     HISTORY OF PRESENT ILLNESS:  A 75-year-old male with end-stage renal disease   on chronic hemodialysis Tuesdays, Thursdays and Saturdays.  The patient was   admitted on 12/31 for right below-the-knee amputation.  He had wound and   gangrene of the right foot.  He has had some peripheral vascular procedures   previously.  He did have a right ray amputation in 11/2020.  He has had a   nonhealing wound and he was admitted for right below-the-knee amputation that   was performed on 12/31.  We have been consulted to assist in his dialysis   needs.  He had dialysis on 12/31 prior to admission.  Today, he is feeling   dyspneic.  He has no cough or sputum production.     PAST MEDICAL HISTORY AND SURGERIES:  1.  Coronary artery stent.  2.  Left lower extremity angioplasty and thrombectomy.  3.  Lower extremities peripheral vascular procedures.  4.  Appendectomy.  5.  Knee surgery.  6.  Right upper extremity AV fistula that clotted.  7.  Left upper extremity AV fistula, has been used a few times.  8.  Right internal jugular PermCath is still in place.  9.  Hernia repair with mesh.  10.  Left great toe amputation.  11.  Right ray amputation in 11/2020.  12.  Right below-the-knee amputation on this admission on 12/31/2020.     MEDICAL ILLNESSES:  1.  End-stage renal disease on chronic hemodialysis Tuesday, Thursday, and   Saturday.  2.  Hypertension.  3.  Diabetes mellitus.  4.  Anemia of chronic kidney disease.  5.  Chronic kidney disease - Metabolic bone disorder.  Managed at the dialysis   unit.  6.  History of bilateral foot wounds.  7.  Peripheral vascular disease.  8.  Paroxysmal atrial fibrillation, on Eliquis.  9.  Coronary artery disease, requiring stents.  10.  History of  hyperthyroidism.  11.  Dyslipidemia, on statin.     ALLERGIES:  OPIOIDS GIVES HIM HALLUCINATIONS AND MIRCERA.     OUTPATIENT MEDICATIONS:  Reviewed and documented in the chart.     FAMILY HISTORY:  Negative for kidney disease.  Positive for diabetes mellitus.     SOCIAL HISTORY:  The patient used to smoke 1 pack of cigarettes a day for at   least 50 years and stopped a few years ago.  He does not drink alcohol.  He is   .  He has two children.  He is retired.  He used to work in   management and consulting for communications.     REVIEW OF SYSTEMS:  He feels dyspneic today.  He has difficulty catching his   breath.  He has no shortness of breath.  He had some difficulty swallowing and   he could not swallow a pill earlier.  He has no fever or chills.  No chest   pain.  Appetite has been poor to fair.  He has nausea with no vomiting.  He   does have urine output with no dysuria.  No peripheral edema.  He has some   abdominal distention, but he has passed gas.  No BM after surgery.  No focal   neurologic symptoms.  No skin rash or itching.  Complete review of systems is   otherwise negative.     PHYSICAL EXAMINATION:  GENERAL:  The patient is alert, mildly dyspneic.  He appears in mild   respiratory distress.  VITAL SIGNS:  Blood pressure is 128/58.  Intake and output is not well   recorded.  SKIN:  With no generalized rash.  LYMPH NODES:  No cervical adenopathies.  HEENT:  Pupils are round.  Oral mucosa with no lesions.  Dentition in fair   state of repair.  NECK:  With no bruits, masses, or thyroid enlargement.  LUNGS:  With poor air movement throughout, especially with decreased breath   sounds in lower fields.  No crackles.  HEART:  Regular rhythm with no pericardial rub present, no murmurs.  ABDOMEN:  Bowel sounds are present.  There is mild distention.  No tenderness.    Abdomen is tympanitic.  No organomegaly or masses.  EXTREMITIES:  Status post right below-the-knee amputation with dressing and    drain.  There is no edema of the left lower extremity.  Status post amputation   of the left great toe.  NEUROLOGIC:  No focal motor deficits or sensory deficits.  PSYCHIATRIC:  The patient is appropriate with normal judgment.  No anxiety or   depression.     LABORATORY DATA:  No labs available today.     IMPRESSION:  1.  End-stage renal disease, on chronic hemodialysis Tuesday, Thursday, and   Saturday.  2.  Dyspnea.  I suspect the patient may have some element of fluid overload.    I am not sure how much fluid  he got after surgery.  3.  Status post right below-the-knee amputation on 12/31/2020.  4.  Hypertension, controlled.  5.  Anemia of chronic kidney disease.  6.  Diabetes mellitus.  7.  Chronic kidney disease - Metabolic bone disorder.  Managed at the dialysis   unit.  8.  Peripheral vascular disease.  9.  Paroxysmal atrial fibrillation, on Eliquis.  10.  Coronary artery disease, status post stents.  Asymptomatic.  11.  History of hyperthyroidism.  12.  Dyslipidemia, on statins.  13.  Past history of heavy tobacco use.     PLAN:  1.  We are going to proceed with a short dialysis today to remove some fluid   and see if that helps his respiratory status.  2.  We will continue his chronic dialysis otherwise on Tuesday, Thursday and   Saturday.  We will dialyze him tomorrow again.  He dialyzes for 3-1/2 hours.  3.  We are going to ultrafiltrate as tolerated.  4.  We are going to check basic metabolic panel today prior to dialysis.  5.  He needs to have blood rechecked tomorrow.  6.  Continue home medicines.  7.  No dietary protein restrictions.  8.  Adjust all of his medicines for a GFR less than 10.     Thanks for this consultation.  We will follow with you.        ______________________________  MD RICHIE BUCHANAN/ROSIE/GEOVANNY    DD:  01/01/2021 13:04  DT:  01/01/2021 15:10    Job#:  [id number]

## 2021-01-03 PROBLEM — D64.9 ANEMIA: Status: ACTIVE | Noted: 2018-08-15

## 2021-01-03 NOTE — CARE PLAN
Problem: Safety  Goal: Will remain free from injury  Outcome: PROGRESSING AS EXPECTED  Note: Encouraged patient to utilize offloading and pillows for support.      Problem: Pain Management  Goal: Pain level will decrease to patient's comfort goal  Outcome: PROGRESSING AS EXPECTED  Note: Will continue to monitor patient for pain and provide interventions per MAR.

## 2021-01-03 NOTE — WOUND TEAM
LPS in to see pt for POD2 dressing change.  RN trying to place a new IV.  Pt had low BP readings and does not feel good, RN aware. Told pt LPS will return tomorrow in the AM to due dressing change.  Orders written for nursing to remove drain with output is less than  30mL/24 hours.

## 2021-01-03 NOTE — PROGRESS NOTES
Spoke with blood bank regarding positive antibody screen. Per blood bank, blood will need to be sent to lab in [place] to determine type of antibody. Blood bank expecting results to be available tomorrow afternoon or Tuesday morning. Dr. Esteves updated.

## 2021-01-03 NOTE — PROGRESS NOTES
"Assumed care of patient this evening. Assessment complete on room air. /55   Pulse 90   Temp 36.2 °C (97.2 °F) (Temporal)   Resp 16   Ht 1.676 m (5' 6\")   Wt 78.3 kg (172 lb 9.9 oz)   SpO2 100%   BMI 27.86 kg/m²  Patient is A&Ox4. States 3/10 pain, declines intervention at this time. On  Renal diabetic tolerating well. Has a R chest dialysis port and a L upper extremity fistula. As well as a 20 in his R forearm.     Plan of care discussed for the day, bed is locked and in the lowest position. All questions answered at this time. Reinforced the use of the call light.   "

## 2021-01-03 NOTE — PROGRESS NOTES
250 ml bolus x 2 administered to patient per telephone order from Dr. Esteves for patient's hypotension.     Pt tolerated well.     SBP > 100 mmHg on reassessment.

## 2021-01-03 NOTE — PROGRESS NOTES
[facility] -  PROGRESS NOTE               Author: Lane Ortega M.D. Date & Time: 1/3/2021  8:10 AM     HPI:   A 75-year-old male with end-stage renal disease   on chronic hemodialysis Tuesdays, Thursdays and Saturdays.  The patient was   admitted on 12/31 for right below-the-knee amputation.  He had wound and   gangrene of the right foot.  He has had some peripheral vascular procedures   previously.  He did have a right ray amputation in 11/2020.  He has had a   nonhealing wound and he was admitted for right below-the-knee amputation that   was performed on 12/31.  We have been consulted to assist in his dialysis   needs.  He had dialysis on 12/31 prior to admission.  Today, he is feeling   dyspneic.  He has no cough or sputum production.    DAILY NEPHROLOGY SUMMARY:  1/1 - Consult seen  1/2 - Tolerated HD yesterday via TDC  1/3 - Tolerated HD via AVF.  No issues per HD nurse.  Complains of itchiness this morning.  Requesting benadryl    REVIEW OF SYSTEMS:    10 point ROS reviewed and is as per HPI/daily summary or otherwise negative    PMH/PSH/SH/FH: Reviewed and unchanged since admission note  CURRENT MEDICATIONS: Reviewed from admission to present day    PHYSICAL EXAM:  Physical Exam   Constitutional: He appears well-developed and well-nourished.   HENT:   Head: Normocephalic.   Eyes: Pupils are equal, round, and reactive to light.   Neck: Normal range of motion.   Cardiovascular: Normal rate, regular rhythm and normal heart sounds.   Pulmonary/Chest: Effort normal. He has rales.   Abdominal: He exhibits no distension. There is no abdominal tenderness.   Musculoskeletal:         General: No edema.       Fluids:  In: 500 [Dialysis:500]  Out: 2350     LABS:  Recent Labs     12/31/20  1510 01/01/21  1256 01/03/21  0051   SODIUM  --  131* 132*   POTASSIUM 3.8 4.2 4.1   CHLORIDE  --  90* 95*   CO2  --  25 27   GLUCOSE  --  128* 108*   BUN  --  26* 16   CREATININE  --  5.37* 3.40*   CALCIUM   --  9.0 9.0       IMPRESSION:  1.  End-stage renal disease, on chronic hemodialysis Tuesday, Thursday, and   Saturday.  2.  Dyspnea.  I suspect the patient may have some element of fluid overload.    3.  Status post right below-the-knee amputation on 12/31/2020.  4.  Hypertension, controlled.  5.  Anemia of chronic kidney disease.  6.  Diabetes mellitus.  7.  Chronic kidney disease - metabolic bone disorder.  Managed at the dialysis   unit.  8.  Peripheral vascular disease.  9.  Paroxysmal atrial fibrillation, on Eliquis.  10.  Coronary artery disease, status post stents.  Asymptomatic.  11.  History of hyperthyroidism.  12.  Dyslipidemia, on statins.  13.  Past history of heavy tobacco use.     PLAN:  1.  Okay to remove tunneled dialysis catheter given AVF is working well  2.  We will continue his chronic dialysis otherwise on Tuesday, Thursday and   Saturday.    3.  We are going to ultrafiltrate as tolerated.  4.  Continue home medicines.  5.  No dietary protein restrictions.  6.  Adjust all of his medicines for intermittent HD    Thanks for this consultation.  We will follow with you.

## 2021-01-03 NOTE — PROGRESS NOTES
Orthopaedic Inpatient Progress Note    Subjective:  No acute events overnight.  Pain controlled.  Patient very concerned about going home as he states that he does not feel safe going home with his lack of mobility.    Objective:    Vital Signs:     Wt Readings from Last 1 Encounters:   12/31/20 78.3 kg (172 lb 9.9 oz)       Intake/Output (Yesterday):  01/02 0700 - 01/03 0659  In: 500   Out: 2350 [Urine:100]    Physical Exam:  General: Well appearing, no acute distress  RLE: Drain intact (0mL of serosanguinous output overnight), dressing c/d/i, compartments soft and compressible, knee immobilizer in place    Assessment: 75 y.o. Male s/p R BKA on 12/31/20    Plan:  -NWB RLE  -continue knee immobilizer to RLE  -FWW to mobilize  -PT/OT  -Vac removed  -turn off MATHEW on POD#7  -pain control  -follow up with Dr. Lynn in 2 weeks  -recommend case management/social work consult to discuss patient's ability to go to home vs. SNF  -ok to DC from ortho perspective      Joanne England MD

## 2021-01-03 NOTE — PROGRESS NOTES
"Assumed care of patient this evening. Assessment complete on room air. /55   Pulse 90   Temp 36.2 °C (97.2 °F) (Temporal)   Resp 16   Ht 1.676 m (5' 6\")   Wt 78.3 kg (172 lb 9.9 oz)   SpO2 100%   BMI 27.86 kg/m² . Patient is A&Ox4. Is on a renal diabetic diet and tolerating well. Complains of 3/10 pain, declines intervention at this time. He has oliguric output at this time.     Patient has a R chest port for dialysis as well as a 20 in the R forearm.     Plan of care discussed for the day, bed is locked and in the lowest position, reinforced the use of the call light. All questions answered at this time.   "

## 2021-01-03 NOTE — PROGRESS NOTES
[facility] Medicine Daily Progress Note    Date of Service  1/3/2021    Chief Complaint  75 y.o. male admitted 12/31/2020 with planned right BKA.    Hospital Course  75 y.o. male with DM2, ESRD on HD, PAD, CAD s/p LAD stent, Hyperthyroidism, pAfib on Eliquis, and HLD who presented 12/31/2020 here for a planned right BKA. He was discharged on 11/19 after a right great toe ray amputation on 11/16 for a nonhealing wound. He was being followed up outpatient and when his amputation site wasn't healing secondary to PAD, it was decided that the best course of action would be a BKA.      Today, he is with his wife in pre-op and in no acute distress. He did have his HD session this morning. Endorses some dizziness but thinks it's 2/2 not eating today and having HD. Gentle IVF currently running while he is in pre-op.     Interval Problem Update  S/P BKA     Consultants/Specialty  Ortho  nephrology    Code Status  Full Code    Disposition  pending    Review of Systems  Review of Systems   Constitutional: Negative for chills, fever and malaise/fatigue.   HENT: Negative for ear discharge, ear pain and tinnitus.    Eyes: Negative for blurred vision, double vision and photophobia.   Respiratory: Negative for cough, hemoptysis and sputum production.    Cardiovascular: Negative for chest pain, palpitations and orthopnea.   Gastrointestinal: Negative for heartburn, nausea and vomiting.   Genitourinary: Negative for dysuria, frequency and urgency.   Musculoskeletal: Positive for joint pain (right stump).   Skin: Negative for itching and rash.   Neurological: Negative for dizziness, tingling and headaches.        Physical Exam  Temp:  [36.2 °C (97.2 °F)-37.1 °C (98.7 °F)] 36.8 °C (98.3 °F)  Pulse:  [] 87  Resp:  [16-19] 18  BP: ()/(38-68) 112/54  SpO2:  [97 %-100 %] 97 %    Physical Exam  Constitutional:       Appearance: He is not toxic-appearing or diaphoretic.   HENT:      Head: Normocephalic and atraumatic.      Nose:  Nose normal.      Mouth/Throat:      Mouth: Mucous membranes are dry.   Eyes:      Extraocular Movements: Extraocular movements intact.      Pupils: Pupils are equal, round, and reactive to light.   Neck:      Musculoskeletal: Normal range of motion and neck supple.   Cardiovascular:      Rate and Rhythm: Normal rate and regular rhythm.      Pulses: Normal pulses.      Heart sounds: Normal heart sounds.   Pulmonary:      Effort: Pulmonary effort is normal.      Breath sounds: Normal breath sounds.   Abdominal:      Palpations: Abdomen is soft.      Tenderness: There is no guarding or rebound.   Musculoskeletal:      Comments: Dressing looks clean on the right stump   Skin:     General: Skin is warm and dry.   Neurological:      General: No focal deficit present.      Mental Status: He is alert and oriented to person, place, and time.         Fluids    Intake/Output Summary (Last 24 hours) at 1/3/2021 0917  Last data filed at 1/3/2021 0733  Gross per 24 hour   Intake 500 ml   Output 2100 ml   Net -1600 ml       Laboratory  Recent Labs     01/01/21  1256 01/03/21  0051   WBC 8.4 9.8   RBC 2.31* 2.04*   HEMOGLOBIN 7.6* 6.6*   HEMATOCRIT 23.2* 20.9*   .4* 102.5*   MCH 32.9 32.4   MCHC 32.8* 31.6*   RDW 57.8* 59.5*   PLATELETCT 352 312   MPV 8.6* 8.7*     Recent Labs     12/31/20  1510 01/01/21  1256 01/03/21  0051   SODIUM  --  131* 132*   POTASSIUM 3.8 4.2 4.1   CHLORIDE  --  90* 95*   CO2  --  25 27   GLUCOSE  --  128* 108*   BUN  --  26* 16   CREATININE  --  5.37* 3.40*   CALCIUM  --  9.0 9.0                   Imaging       Assessment/Plan  * Wound of right foot- (present on admission)  Assessment & Plan  Nonhealing incision s/p right great toe ray amputation in Nov 2020.   - s/p  BKA   - Orthopedic surgery finput is noted  - pain is better  Controlled  -pain is better controlled  - PT/OT inputs are noted  Home with HH    Peripheral vascular disease (HCC)- (present on admission)  Assessment &  Plan  Complicating wound healing. Continue to reduce risk factors.  - s/p angiogram and thrombectomy in April 2020  -resume eliquis once permitted by ortho    End stage renal disease on dialysis (HCC)- (present on admission)  Assessment & Plan  On HD- TTS  - cnephrology input is noted  - renal/DM diet  - continue phos binder  - EPO per nephro    Type 2 diabetes mellitus (HCC)- (present on admission)  Assessment & Plan  With hyperglycemia, A1C 7.8%.   - lantus 5 U nightly  - DM/renal diet and hypoglycemia protocol  - sensitive sliding scale q6 hrs as he's currently NPO for the surgery    Anemia  Assessment & Plan  Minimal blood is noted in the drain on the right stump  Appears to be 2nd to chronic kidney disease  Awaiting prbc transfusion once blood is cleared by blood bank  Not in distress  dced eliquis for now    Paroxysmal A-fib ([facility])- (present on admission)  Assessment & Plan  Rate controlled  - dced coreg 2nd to low bp  Added lopressor  dced eliquis 2nd to drop in h/h    Dyslipidemia- (present on admission)  Assessment & Plan  Reports that he hasn't been taking his atorvastatin. Lipid panel from 11/14 showed elevated TAGs and LDL at 85  - resume atorvastatin    Essential hypertension- (present on admission)  Assessment & Plan  Normotensive.   - resume home coreg with holding parameters    Hyperthyroidism- (present on admission)  Assessment & Plan  - resume home methimazole       VTE prophylaxis: scd

## 2021-01-03 NOTE — PROGRESS NOTES
Patient hemoglobin came back at 6.6, called on call hospitalist. Order received for 1 unit of PRBC. Awaiting antibody information from blood bank.

## 2021-01-04 NOTE — DISCHARGE PLANNING
[facility]     Referral from:  Dr. Esteves  Facesheet indicates:  Medicare/[facility] Insurance  Potential Rehab Diagnosis:  Unilateral BKA    Chart review indicates patient has on going medical management and therapy needs to possibly meet inpatient rehab facility criteria with the goal of returning to community.    D/C support:  Ex-wife/Son     Physiatry referral forwarded for consult.        Last Covid test date:  12/29/2020      Thank you for the referral.

## 2021-01-04 NOTE — PROGRESS NOTES
IR RN note    Patient underwent a Tunneled hemodialysis catheter removal by Dr. Chan.  Consent was signed.  IR tech Sergei and Neyda assisted. No sedation given. Patient was placed in a Supine position.  Report called to Kat MONTOYA. Pt transported by rBoise with RN to T417, with monitor.     Notified JAN Stephens that during procedure O2 sats were between 83-90%.     Tip not cultured per MD Chan.

## 2021-01-04 NOTE — PROGRESS NOTES
S:     POD # 4 s/p R BKA    Complaining of a little bit of throat congestion and some lightheadedness when he sits up.  Antibodies found on type and screen for blood transfusion and has not received transfusion yet.    O:   Splint/dressings c/d/I   KI in place   Na in place with good seal   NVI proximally    A:    POD# 4 s/p R BKA overall doing well    P:   NWB   Elevation  PT/OT   DVT ppx: per primary--would discontinue use of aspirin once back on home anticoagulation   D/c plan: Recommend acute rehab placement

## 2021-01-04 NOTE — DISCHARGE PLANNING
[facility]/ Transitional Care Coordination  Physiatry consult completed indicating pt is a good candidate for in pt rehab.     PAS done for Dr Lata Goodman to review for admission.    COVID test ordered - notified  Kat BOOKER/ Rn floor RN that wet swab covid test needs to be done.     Tc to pt's son, Miguel Sepulveda ; unable to leave message.      Plan  Continue to try to reach son re rehab  Admit to IRF when medically cleared.   WBC's 17.3

## 2021-01-04 NOTE — DISCHARGE PLANNING
Anticipated Discharge Disposition:   · TBD    Action:   · Completed chart review and verbally discussed pt's POC with Dr. Esteves.   · RN LAZARO spoke with the pt at bedside to discuss discharge planning. Pt states, he in home alone most of the day and does not feel safe discharging home with home health care. Pt is accepted with [facility] at this time; this is a resumption of service. Pt prefers to discharge to IRF.   · JAN WILLIS called Dr. Esteves via Voalte phone and updated him regarding the pt concerns for discharge home with HHC and preference to be evaluated by PMR. MD states he will place a PMR referral.     Barriers to Discharge:   · TBD    Plan:   · Continue to collaborate with the pt, pt's family, and health care team to provide social and discharge support as needed.

## 2021-01-04 NOTE — PROGRESS NOTES
[facility] Medicine Daily Progress Note    Date of Service  1/4/2021    Chief Complaint  75 y.o. male admitted 12/31/2020 with planned right BKA.    Hospital Course  75 y.o. male with DM2, ESRD on HD, PAD, CAD s/p LAD stent, Hyperthyroidism, pAfib on Eliquis, and HLD who presented 12/31/2020 here for a planned right BKA. He was discharged on 11/19 after a right great toe ray amputation on 11/16 for a nonhealing wound. He was being followed up outpatient and when his amputation site wasn't healing secondary to PAD, it was decided that the best course of action would be a BKA.      Today, he is with his wife in pre-op and in no acute distress. He did have his HD session this morning. Endorses some dizziness but thinks it's 2/2 not eating today and having HD. Gentle IVF currently running while he is in pre-op.     Interval Problem Update  S/P BKA     Consultants/Specialty  Ortho  Nephrology  Physical medicine    Code Status  Full Code    Disposition  pending    Review of Systems  Review of Systems   Constitutional: Negative for chills, diaphoresis and malaise/fatigue.   HENT: Negative for ear discharge, ear pain and nosebleeds.    Eyes: Negative for double vision, photophobia and pain.   Respiratory: Negative for hemoptysis, sputum production and shortness of breath.    Cardiovascular: Negative for palpitations, orthopnea and claudication.   Gastrointestinal: Negative for abdominal pain, nausea and vomiting.   Genitourinary: Negative for frequency, hematuria and urgency.   Musculoskeletal: Positive for joint pain (right stump).   Skin: Negative for itching and rash.   Neurological: Negative for tingling, tremors and headaches.        Physical Exam  Temp:  [36.6 °C (97.8 °F)-37.3 °C (99.2 °F)] 37.3 °C (99.2 °F)  Pulse:  [] 115  Resp:  [16-18] 18  BP: (105-145)/(45-74) 140/63  SpO2:  [93 %-97 %] 93 %    Physical Exam  Constitutional:       General: He is not in acute distress.     Appearance: He is not  toxic-appearing.   HENT:      Head: Normocephalic and atraumatic.      Nose: No congestion or rhinorrhea.      Mouth/Throat:      Mouth: Mucous membranes are dry.   Eyes:      Conjunctiva/sclera: Conjunctivae normal.      Pupils: Pupils are equal, round, and reactive to light.   Neck:      Musculoskeletal: No neck rigidity or muscular tenderness.   Cardiovascular:      Rate and Rhythm: Normal rate and regular rhythm.      Heart sounds: No murmur. No friction rub.   Pulmonary:      Effort: No respiratory distress.      Breath sounds: No stridor.   Abdominal:      Tenderness: There is no abdominal tenderness. There is no guarding or rebound.   Musculoskeletal:      Comments: Dressing looks clean on the right stump   Skin:     Coloration: Skin is not jaundiced or pale.   Neurological:      General: No focal deficit present.      Mental Status: He is alert. Mental status is at baseline.         Fluids    Intake/Output Summary (Last 24 hours) at 1/4/2021 1109  Last data filed at 1/3/2021 1955  Gross per 24 hour   Intake 480 ml   Output 100 ml   Net 380 ml       Laboratory  Recent Labs     01/01/21  1256 01/03/21  0051 01/04/21  0651   WBC 8.4 9.8 15.5*   RBC 2.31* 2.04* 2.35*   HEMOGLOBIN 7.6* 6.6* 7.7*   HEMATOCRIT 23.2* 20.9* 23.5*   .4* 102.5* 100.0*   MCH 32.9 32.4 32.8   MCHC 32.8* 31.6* 32.8*   RDW 57.8* 59.5* 58.2*   PLATELETCT 352 312 340   MPV 8.6* 8.7* 8.6*     Recent Labs     01/01/21  1256 01/03/21  0051 01/04/21  0651   SODIUM 131* 132* 128*   POTASSIUM 4.2 4.1 4.7   CHLORIDE 90* 95* 91*   CO2 25 27 21   GLUCOSE 128* 108* 103*   BUN 26* 16 32*   CREATININE 5.37* 3.40* 5.74*   CALCIUM 9.0 9.0 9.2                   Imaging       Assessment/Plan  * Wound of right foot- (present on admission)  Assessment & Plan  Nonhealing incision s/p right great toe ray amputation in Nov 2020.   - s/p  BKA   - Orthopedic surgery finput is noted  - pain is better  Controlled  -pain is better controlled  - PT/OT inputs  are noted  Home with HH  However pt does not feel like he can manage at home  He has requested to be seen by physical medicine doctor and I have placed the order.    Peripheral vascular disease (HCC)- (present on admission)  Assessment & Plan  Complicating wound healing. Continue to reduce risk factors.  - s/p angiogram and thrombectomy in April 2020  -resume eliquis once permitted by ortho    End stage renal disease on dialysis (HCC)- (present on admission)  Assessment & Plan  On HD- TTS  - cnephrology input is noted  - renal/DM diet  - continue phos binder  - EPO per nephro  Spoke with dr wells/vascular surgery for removal of tunneled dialysis and he recommended to f/u outpt at the office    Type 2 diabetes mellitus (HCC)- (present on admission)  Assessment & Plan  With hyperglycemia, A1C 7.8%.   - lantus 5 U nightly  - DM/renal diet and hypoglycemia protocol  - sensitive sliding scale q6 hrs as he's currently NPO for the surgery    Anemia  Assessment & Plan  Minimal blood is noted in the drain on the right stump  Appears to be 2nd to chronic kidney disease  His repeat hemoglobin is 7.7 and previous hemoglobin could have been  2nd to lab error  Will repeat cbc  Cancelled blood transfusion  Not in distress  dced eliquis for now    Paroxysmal A-fib (HCC)- (present on admission)  Assessment & Plan  Rate controlled  - dced coreg 2nd to low bp  Added lopressor  dced eliquis 2nd to drop in h/h    Dyslipidemia- (present on admission)  Assessment & Plan  Reports that he hasn't been taking his atorvastatin. Lipid panel from 11/14 showed elevated TAGs and LDL at 85  - resume atorvastatin    Essential hypertension- (present on admission)  Assessment & Plan  Normotensive.   - resume home coreg with holding parameters    Hyperthyroidism- (present on admission)  Assessment & Plan  - resume home methimazole       VTE prophylaxis: scd

## 2021-01-04 NOTE — PROGRESS NOTES
[facility] -  PROGRESS NOTE               Author: Mario Hunter M.D. Date & Time: 1/4/2021  9:27 AM     HPI:   A 75-year-old male with end-stage renal disease   on chronic hemodialysis Tuesdays, Thursdays and Saturdays.  The patient was   admitted on 12/31 for right below-the-knee amputation.  He had wound and   gangrene of the right foot.  He has had some peripheral vascular procedures   previously.  He did have a right ray amputation in 11/2020.  He has had a   nonhealing wound and he was admitted for right below-the-knee amputation that   was performed on 12/31.  We have been consulted to assist in his dialysis   needs.  He had dialysis on 12/31 prior to admission.  Today, he is feeling   dyspneic.  He has no cough or sputum production.    DAILY NEPHROLOGY SUMMARY:  1/1 - Consult seen  1/2 - Tolerated HD yesterday via TDC  1/3 - Tolerated HD via AVF.  No issues per HD nurse.  Complains of itchiness this morning.  Requesting benadryl  1/4: No acute events, pending rehab placement, tired this am, sedated.     REVIEW OF SYSTEMS:    10 point ROS reviewed and is as per HPI/daily summary or otherwise negative    PMH/PSH/SH/FH: Reviewed and unchanged since admission note  CURRENT MEDICATIONS: Reviewed from admission to present day    PHYSICAL EXAM:  Physical Exam   Constitutional: He appears well-developed and well-nourished.   HENT:   Head: Normocephalic.   Eyes: Pupils are equal, round, and reactive to light.   Neck: Normal range of motion.   Cardiovascular: Normal rate, regular rhythm and normal heart sounds.   Pulmonary/Chest: No respiratory distress.   Abdominal: He exhibits no distension. There is no abdominal tenderness.   Musculoskeletal:         General: No edema.       Fluids:  In: 480 [P.O.:480]  Out: 200     LABS:  Recent Labs     01/01/21  1256 01/03/21  0051 01/04/21  0651   SODIUM 131* 132* 128*   POTASSIUM 4.2 4.1 4.7   CHLORIDE 90* 95* 91*   CO2 25 27 21   GLUCOSE 128* 108*  103*   BUN 26* 16 32*   CREATININE 5.37* 3.40* 5.74*   CALCIUM 9.0 9.0 9.2       IMPRESSION:  #  End-stage renal disease, on chronic hemodialysis Tuesday, Thursday, and   Saturday.    #  Status post right below-the-knee amputation on 12/31/2020.  #  Hypertension, controlled.  #  Anemia of chronic kidney disease.  #  Diabetes mellitus.  #  Chronic kidney disease - metabolic bone disorder.  Managed at the dialysis   unit.  #  Peripheral vascular disease.  #  Paroxysmal atrial fibrillation, on Eliquis.  #  Coronary artery disease, status post stents.  Asymptomatic.  #  History of hyperthyroidism.  #  Dyslipidemia, on statins.  #  Past history of heavy tobacco use.     PLAN:  - Okay to remove tunneled dialysis catheter given AVF is working well, order placed  - No HD today, to conitnueTuesday, Thursday and Saturday.    - ultrafiltration as tolerated  -ARMANDO TIW with HD  - Continue home medicines.  - No dietary protein restrictions.  - Adjust all of his medicines for intermittent HD    Thank you.    Mario Hunter MD  [facility]  [phone]

## 2021-01-04 NOTE — PROGRESS NOTES
Bedside report received.  Assessment complete.  A&O x 4. Patient calls appropriately.  Patient mobilizes with max assist. Bed alarm on.   Patient has 0/10 pain. Declines pain or medications needed at this time.  Denies N&V. Tolerating renal diabetic diet.  Surgical site to right leg, BKA dressing and immobilizer in place, CDI.  + void, + flatus, + BM.  Patient denies SOB.  SCD's off.  Review plan with of care with patient. Call light and personal belongings with in reach. Hourly rounding in place. All needs met at this time.

## 2021-01-04 NOTE — PROGRESS NOTES
[facility] PRESERVATION SERVICE   RN/PT NOTE        Reason for LPS Consultation: Right BKA    Consulting Provider:  Sukh Aguero MD    History of Present Illness: Patient is a 75 y.o. male with past medical history that includes type 2 diabetes,  ESRD on hemodialysis, PAD, Afib, L great toe amputation with tenotomy, and HLD, , admitted 12/31/2020 for Pain in toe of right foot [M79.674], S/P BKA (below knee amputation) unilateral (HCC) [Z89.519].  An LPS consult has been requested for evaluation of right BKA.    Diagnosed with diabetes 20 years ago, and is currently managing with Lantus only at night.  Checks blood sugars 1 time per day and reports that these typically average 100-120.  Has had previous diabetes education.  Does have numbness to feet.  Checks feet weekly. Usually wears nonprescriptive tennis shoes.  Patient had left great toe amputation with tentomy with Dr. Lynn on 5/17/2020, right great toe gangrene had worsen over time and patient had right BKA on 12/31/2020  Current occupation: Retired.    Past medical history, medicines, allergies, family history, social history,    reviewed      SURGERY DATE: 12/31/2020  PROCEDURE: Right BKA    DIAGNOSTICS this admission    Xray:  none   MRI: none             CT: none             Vascular: none   Other: none               Labs                                      COVID-19: negative          ESR:                         CRP:              A1c:   Lab Results   Component Value Date/Time    HBA1C 7.8 (H) 11/14/2020 02:39 AM            PHYSICAL EXAMINATION:   General Appearance:  Well developed, well nourished, in no acute distress    Vascular Assessment:  Palpable right popliteal pulses +2  Left foot DP +1, doppler hyperemic  Left foot PT +1, doppler hyperemic  Sensory Assessment  LOPS  Wound Assessment:    MATHEW device in place.  Distal portion of right BKA is supple and has hypersensitivity to the lateral and medial portion of BKA.                Patient Education:   MATHEW device to be on for 7 days.  Pulled hemovac.      PLAN:   POD#3 s/p Right BKA.  MATHEW device to be turned off on POD#7, can be left in place until follow-up appointment with Dr. Lynn in 2 weeks.  If the MATHEW island dressing saturates through, change to dry island dressing.       Wound care:   -Wound care orders placed for nursing    Imaging/Labs:  -COVID-19: not detected this admission    Vascular status:   - Palpable popliteal pulses +2    Surgery:   - none anticipated.    Antibiotics:   - ID involved     Weight Bearing Status:   -Non Weight bearing to right BKA site    Offloading:   -Immobilizer; in place    PT/OT:   -involved. Discussed with Hospitalist.       Diabetes Education:   - not necessary at this time. Discussed with Hospitalist.     - Implications of loss of protective sensation (LOPS) discussed with patient- including increased risk for amputation.  Advised to check feet at least daily, moisturize feet, and to always wear protective foot wear.   -avoid trimming own nails. See podiatrist or certified foot and nail RN  -keep blood sugars <150 for improved wound healing        D/W: pt, RN, Dr. Woody, Dr. Esteves      DISCHARGE PLAN:  Would recommend SNF or Rehab, patient does not feel safe for discharge home as he lives with his son and family members that are gone from the house for 10hrs + daily.     Follow up: Follow up with Dr. Lynn  in 2 weeks.         Please contact LPS RN or LPS PT via Voalte.

## 2021-01-04 NOTE — CARE PLAN
Problem: Safety  Goal: Will remain free from injury  Outcome: PROGRESSING AS EXPECTED  Note: Utilizing pillow supports and frequent repositioning.      Problem: Pain Management  Goal: Pain level will decrease to patient's comfort goal  Outcome: PROGRESSING AS EXPECTED  Note: Will continue to monitor patient for pain, and provide interventions per MAR.

## 2021-01-04 NOTE — DISCHARGE PLANNING
Received Choice form at [phone]  Agency/Facility Name: [facility]  Referral sent per Choice form @ [phone]

## 2021-01-04 NOTE — DISCHARGE PLANNING
Outpatient Dialysis Note    Confirmed patient is active at:    [facility]   [address]  [place] [address]    Schedule: Tuesday, Thursday, Saturday   Time: 09:45am    Patient is seen by Dr. Tomlin in HD clinic.    Spoke with Clayton at facility who confirmed.    Forwarded records for review.    Becca Bojorquez- Dialysis Coordinator  [facility] # [phone]

## 2021-01-04 NOTE — PROGRESS NOTES
Entry time 1830 - Received report of patient at start of shift. Patient is AOx4, declines intervention for pain. Ace bandage and immobilizer in place to RLE, DANIELLE. Patient anxious throughout shift; complains of throat tightness, persistent cough, and difficulty swallowing. Dr. Esteves made aware, new orders received for PRN cepacol and hycodan. RT to bedside to assess patient. Vital signs stable, O2 >96% on 1L via NC. Wife at bedside visiting. Plan of care discussed. Safety precautions in place.

## 2021-01-04 NOTE — CONSULTS
Physical Medicine and Rehabilitation Consultation         Initial Consult      Initial Consultation Date: 1/4/2021  Consulting provider: Dr. Elliott Esteves  Reason for consultation: assess for acute inpatient rehab appropriateness  LOS: 4 Day(s)      Chief complaint: BKA      HPI:   The patient is a 75 y.o. male with a past medical history of DM2, ESRD on HD, PAD, CAD s/p LAD stent, hyperthyroidism, PAF on eliquis, HL;  who presented on 12/31/2020  2:20 PM with planned right BKA. Recent history of right great toe amputation on 11/16/20 for a nonhealing wound. Due to non-healing amputation wound on follow up outpatient visit, admitted for BKA. For right lower extremity osteomyelitis s/p right BKA on 12/31/20 with Dr. Leobardo Lynn. NWB, knee immobilizer. Hospital course with ESRD on HD, hyponatremia, anemia, drain management, and monitoring for appropriate time to resume anticoagulation in setting of PAF, CAD s/p stent, anemia, and recent surgery. Tunneled dialysis cath removed 1/4; to use AV fistula for HD.        Social Hx:  Pre-morbidly, this patient lived in:   1 story home  With 0 steps to enter  Ex wife/son can assist        Current level of function:   PT, 1/1/21: Min A FWW x2 ft; Superv bed mobility/Transfers  OT, 1/1/21: Superv for bed mobility and ADLs; Min A for bathing; Max A for toileting       Restrictions: NWB RLE          PMH:  Past Medical History:   Diagnosis Date   • Arrhythmia     hx a fib   • Arthritis 12/29/2020    knees, ankles, back   • CAD (coronary artery disease)     stents   • Chronic anticoagulation 3/26/2020   • Chronic kidney disease (CKD), stage V (HCC)    • Congestive heart failure (HCC)     hx of   • Dental disorder 12/29/2020    partial upper   • Diabetes    • Hemodialysis patient (HCC)     Tuesday, Thursday, Saturday   • Hypertension    • Kidney failure    • Myocardial infarct (HCC)     ? 2019    • Osteoarthritis    • Peripheral neuropathy    • Pneumonia     per hx   • Sleep  apnea     does not use cpap anymore         PSH:  Past Surgical History:   Procedure Laterality Date   • KNEE AMPUTATION BELOW Right 12/31/2020    Procedure: AMPUTATION, BELOW KNEE ...;  Surgeon: Leobardo Lynn M.D.;  Location: SURGERY [facility];  Service: Orthopedics   • TOE AMPUTATION Right 11/16/2020    Procedure: AMPUTATION, TOE GREAT;  Surgeon: Leobardo Lynn M.D.;  Location: SURGERY [facility];  Service: Orthopedics   • TOE AMPUTATION Left 5/17/2020    Procedure: AMPUTATION, TOE GREAT;  Surgeon: Leobardo yLnn M.D.;  Location: SURGERY [facility];  Service: Orthopedics   • TENOTOMY Left 5/17/2020    Procedure: FLEXOR TENOTOMIES, TWO-FIVE TOES;  Surgeon: Leobardo Lynn M.D.;  Location: SURGERY [facility];  Service: Orthopedics   • APPENDECTOMY     • OTHER CARDIAC SURGERY      stents x1   • OTHER ORTHOPEDIC SURGERY      knee surgery         FHX: Reviewed.  Family History   Problem Relation Age of Onset   • Heart Disease Mother    • Alcohol/Drug Father    • Thyroid Son    • Diabetes Brother    • Hypertension Neg Hx    • Hyperlipidemia Neg Hx                  Medications:  Current Facility-Administered Medications   Medication Dose   • metoprolol (LOPRESSOR) tablet 37.5 mg  37.5 mg   • [START ON 1/5/2021] epoetin (Retacrit) injection (Dialysis use only) 10,000 Units  10,000 Units   • benzocaine-menthol (CEPACOL) lozenge 1 Lozenge  1 Lozenge   • HYDROcodone-homatropine 5-1.5 mg/5 mL solution 5 mL  5 mL   • morphine (pf) 4 mg/mL injection 2 mg  2 mg   • morphine ER (MS CONTIN) tablet 15 mg  15 mg   • oxyCODONE immediate release (ROXICODONE) tablet 10 mg  10 mg   • calcitRIOL (ROCALTROL) capsule 0.25 mcg  0.25 mcg   • calcium acetate (PHOS-LO) 667 MG tablet 1,334 mg  1,334 mg   • insulin glargine (Lantus) injection  5 Units   • methimazole (TAPAZOLE) tablet 7.5 mg  7.5 mg   • acetaminophen (Tylenol) tablet 650 mg  650 mg   • ondansetron (ZOFRAN) syringe/vial injection 4 mg  4 mg   • ondansetron  "(ZOFRJAVID ODT) dispertab 4 mg  4 mg   • insulin regular (HumuLIN R,NovoLIN R) injection  1-6 Units    And   • glucose 4 g chewable tablet 16 g  16 g    And   • dextrose 50% (D50W) injection 50 mL  50 mL   • atorvastatin (LIPITOR) tablet 40 mg  40 mg   • methimazole (TAPAZOLE) tablet 5 mg  5 mg   • omeprazole (PRILOSEC) capsule 20 mg  20 mg       Allergies:  Allergies   Allergen Reactions   • Mircera [Methoxy Polyethylene Glycol-Epoetin Beta] Hives   • Other Drug      \"Opiods\" caused  hallucinations         Vitals: /63   Pulse (!) 115   Temp 37.3 °C (99.2 °F) (Oral)   Resp 18   Ht 1.676 m (5' 6\")   Wt 78.3 kg (172 lb 9.9 oz)   SpO2 93%               Labs: Reviewed and significant for 1/4/21: Hgb 7, Na 128, Cr 5.74  Recent Labs     01/03/21  0051 01/04/21  0651 01/04/21  1159   RBC 2.04* 2.35* 2.08*   HEMOGLOBIN 6.6* 7.7* 7.0*   HEMATOCRIT 20.9* 23.5* 20.7*   PLATELETCT 312 340 301     Recent Labs     01/03/21  0051 01/04/21  0651   SODIUM 132* 128*   POTASSIUM 4.1 4.7   CHLORIDE 95* 91*   CO2 27 21   GLUCOSE 108* 103*   BUN 16 32*   CREATININE 3.40* 5.74*   CALCIUM 9.0 9.2     Recent Results (from the past 24 hour(s))   ACCU-CHEK GLUCOSE    Collection Time: 01/03/21  6:38 PM   Result Value Ref Range    Glucose - Accu-Ck 96 65 - 99 mg/dL   ACCU-CHEK GLUCOSE    Collection Time: 01/03/21  9:49 PM   Result Value Ref Range    Glucose - Accu-Ck 105 (H) 65 - 99 mg/dL   ACCU-CHEK GLUCOSE    Collection Time: 01/04/21  5:53 AM   Result Value Ref Range    Glucose - Accu-Ck 95 65 - 99 mg/dL   CBC WITH DIFFERENTIAL    Collection Time: 01/04/21  6:51 AM   Result Value Ref Range    WBC 15.5 (H) 4.8 - 10.8 K/uL    RBC 2.35 (L) 4.70 - 6.10 M/uL    Hemoglobin 7.7 (L) 14.0 - 18.0 g/dL    Hematocrit 23.5 (L) 42.0 - 52.0 %    .0 (H) 81.4 - 97.8 fL    MCH 32.8 27.0 - 33.0 pg    MCHC 32.8 (L) 33.7 - 35.3 g/dL    RDW 58.2 (H) 35.9 - 50.0 fL    Platelet Count 340 164 - 446 K/uL    MPV 8.6 (L) 9.0 - 12.9 fL    " Neutrophils-Polys 87.50 (H) 44.00 - 72.00 %    Lymphocytes 5.10 (L) 22.00 - 41.00 %    Monocytes 6.10 0.00 - 13.40 %    Eosinophils 0.00 0.00 - 6.90 %    Basophils 0.30 0.00 - 1.80 %    Immature Granulocytes 1.00 (H) 0.00 - 0.90 %    Nucleated RBC 0.00 /100 WBC    Neutrophils (Absolute) 13.51 (H) 1.82 - 7.42 K/uL    Lymphs (Absolute) 0.79 (L) 1.00 - 4.80 K/uL    Monos (Absolute) 0.95 (H) 0.00 - 0.85 K/uL    Eos (Absolute) 0.00 0.00 - 0.51 K/uL    Baso (Absolute) 0.04 0.00 - 0.12 K/uL    Immature Granulocytes (abs) 0.16 (H) 0.00 - 0.11 K/uL    NRBC (Absolute) 0.00 K/uL   Comp Metabolic Panel    Collection Time: 01/04/21  6:51 AM   Result Value Ref Range    Sodium 128 (L) 135 - 145 mmol/L    Potassium 4.7 3.6 - 5.5 mmol/L    Chloride 91 (L) 96 - 112 mmol/L    Co2 21 20 - 33 mmol/L    Anion Gap 16.0 7.0 - 16.0    Glucose 103 (H) 65 - 99 mg/dL    Bun 32 (H) 8 - 22 mg/dL    Creatinine 5.74 (HH) 0.50 - 1.40 mg/dL    Calcium 9.2 8.5 - 10.5 mg/dL    AST(SGOT) 21 12 - 45 U/L    ALT(SGPT) <5 2 - 50 U/L    Alkaline Phosphatase 107 (H) 30 - 99 U/L    Total Bilirubin 0.5 0.1 - 1.5 mg/dL    Albumin 3.5 3.2 - 4.9 g/dL    Total Protein 6.8 6.0 - 8.2 g/dL    Globulin 3.3 1.9 - 3.5 g/dL    A-G Ratio 1.1 g/dL   ESTIMATED GFR    Collection Time: 01/04/21  6:51 AM   Result Value Ref Range    GFR If  12 (A) >60 mL/min/1.73 m 2    GFR If Non African American 10 (A) >60 mL/min/1.73 m 2   Histology Request    Collection Time: 01/04/21  8:03 AM   Result Value Ref Range    Pathology Request Sent to [facility]    ACCU-CHEK GLUCOSE    Collection Time: 01/04/21 11:32 AM   Result Value Ref Range    Glucose - Accu-Ck 92 65 - 99 mg/dL   CBC WITH DIFFERENTIAL    Collection Time: 01/04/21 11:59 AM   Result Value Ref Range    WBC 17.3 (H) 4.8 - 10.8 K/uL    RBC 2.08 (L) 4.70 - 6.10 M/uL    Hemoglobin 7.0 (L) 14.0 - 18.0 g/dL    Hematocrit 20.7 (L) 42.0 - 52.0 %    MCV 99.5 (H) 81.4 - 97.8 fL    MCH 33.7 (H) 27.0 - 33.0 pg    MCHC 33.8 33.7  - 35.3 g/dL    RDW 56.7 (H) 35.9 - 50.0 fL    Platelet Count 301 164 - 446 K/uL    MPV 8.5 (L) 9.0 - 12.9 fL    Neutrophils-Polys 87.60 (H) 44.00 - 72.00 %    Lymphocytes 5.30 (L) 22.00 - 41.00 %    Monocytes 6.10 0.00 - 13.40 %    Eosinophils 0.00 0.00 - 6.90 %    Basophils 0.20 0.00 - 1.80 %    Immature Granulocytes 0.80 0.00 - 0.90 %    Nucleated RBC 0.00 /100 WBC    Neutrophils (Absolute) 15.10 (H) 1.82 - 7.42 K/uL    Lymphs (Absolute) 0.92 (L) 1.00 - 4.80 K/uL    Monos (Absolute) 1.05 (H) 0.00 - 0.85 K/uL    Eos (Absolute) 0.00 0.00 - 0.51 K/uL    Baso (Absolute) 0.04 0.00 - 0.12 K/uL    Immature Granulocytes (abs) 0.14 (H) 0.00 - 0.11 K/uL    NRBC (Absolute) 0.00 K/uL           Imaging:  Ct-cta Chest Pulmonary Artery W/ Recons  Result Date: 12/7/2020 12/7/2020 9:12 PM HISTORY/REASON FOR EXAM:  Shortness of breath, cough and dizziness. TECHNIQUE/EXAM DESCRIPTION: CT angiogram scan for pulmonary embolism with contrast, with reconstructions. 1.25 mm helical sections were obtained from the lung apices through the lung bases following the rapid bolus administration of 171 mL of Omnipaque 350 nonionic contrast. Thin-section overlapping reconstruction interval was utilized. Coronal reconstructions were generated from the axial data. MIP post processing was performed and utilized for the interpretation. Low dose optimization technique was utilized for this CT exam including automated exposure control and adjustment of the mA and/or kV according to patient size. COMPARISON: 3/29/2014 FINDINGS: There is emphysematous change of the lungs. There are areas of bullous change seen as well. There are areas of pulmonary scarring within the left lung base. There is extensive pleural calcification on the left most probably posteriorly and laterally inferiorly within the left lung. Infiltrated contrast is seen extending into the right chest. There is no evidence of pericardial effusion. There are gallstones within the  gallbladder. There are hepatic granulomas. There multiple small hepatic cysts. The spleen is enlarged. Kidneys are atrophic. There is no evidence of filling defect within the pulmonary arterial system to suggest presence of pulmonary embolus. There is an appearance of the spine suggesting presence of either ankylosing spondylitis or diffuse idiopathic skeletal hyperostosis.     1.  No evidence of pulmonary embolus. 2.  Emphysematous change of the lungs. 3.  Again seen scarring within the left lung base with extensive pleural calcification. 4.  Gallstones. 5.  Splenomegaly. 6.  Renal cortical thinning bilaterally.              ASSESSMENT:  Patient is a 75 y.o. male admitted for right lower extremity osteomyelitis s/p right BKA on 12/31/20 with Dr. Leobardo Lynn. Hospital course with ESRD on HD, hyponatremia, anemia, drain management, and monitoring for appropriate time to resume anticoagulation in setting of PAF, CAD s/p stent, anemia, and recent surgery. Recent history of right great toe amputation on 11/16/20 for a nonhealing wound.       #Rehab:   -Vitals: stable, RA  -Insurance: Medicare/[employer]  -Discharge support: ex wife/son  -Medical complexity: BKA, DM, ESRD on HD, PAD, CAD s/p stent, PAF on AC; anemia with Hgb 7 on 1/4 requiring lab checks  -Rehab Impairment Code: 0005.4 - Amputation: Unilateral Lower Limb Below the Knee (BK)  -Reason for admission: Wound of right foot  -When medically cleared, meets criteria for IRF       #Ortho: right lower extremity osteomyelitis s/p right BKA on 12/31/20 with Dr. Leobardo Lynn  -NWB, knee immobilizer  -eliquis when anemia improves  -drain in right knee    #ID: right foot wound, non healing, history of right great toe amputation on 11/16/20    #CV: HTN, PAD, CAD s/p stent, PAF on AC at home  -atorvastatin, metoprolol    #Anemia: Hgb 7 on 1/4/21; of note, has CKD  -monitor    #Supp: calcitriol     #DM: SSI, lantus    #Renal: ESRD on HD (Tu/Thu/Sa); Tunneled  dialysis cath removed 1/4; to use AV fistula for HD    #Pain: PRN IV morphine; MS contin     #GI: omeprazole    #Hyponatremia: Na 128 on 1/4 <= 132 on 1/3  -monitor    #Hyperthyroidism: methimazole    #Bowel: last BM on 1/2    #Bladder: voiding     #DVT PPX:   -SCDs  -home apixaban on hold due to Hgb 7 on 1/4/21        Thank you for allowing us to participate in the care of this patient.             Drew Gr MD  Physical Medicine and Rehabilitation   1/4/2021

## 2021-01-04 NOTE — PROGRESS NOTES
"Assumed care or patent this evening. Assessment complete on room air. /62   Pulse 93   Temp 36.6 °C (97.8 °F) (Temporal)   Resp 16   Ht 1.676 m (5' 6\")   Wt 78.3 kg (172 lb 9.9 oz)   SpO2 97%   BMI 27.86 kg/m² . Patient is A&Ox4. Is on a renal diabetic diet and tolerating well. Complains of 3/10 pain, declines interventions at this time. Oliguric output due to ESRD at this time.     Patient has a R chest port for dialysis as well as a 20 in the R forearm. Has a L AV fistula in place to upper arm. Bruit and Thrill present.     Plan of care discussed for the day, bed is locked and in the lowest position, reinforced the use of the call light. All questions answered at this time.           "

## 2021-01-04 NOTE — PREADMISSION SCREENING NOTE
"  Pre-Admission Screening Form    Patient Information:   Name: Luis Sepulveda     MRN: [id number]       : 1945      Age: 75 y.o.   Gender: male      Race: White [7]       Marital Status:  [4]  Family Contact: Miguel Sepulveda Mary        Relationship: Son [15]  Relative [11]  Home Phone:   [phone]           Cell Phone: [phone]  Advanced Directives: None  Code Status:  FULL  Current Attending Provider: RYLIE Esteves M.D.  Referring Physician: Dr. Esteves      Physiatrist Consult: Dr. Gr      Referral Date: 2020  Primary Payor Source:  MEDICARE  Secondary Payor Source:  [facility]    Medical Information:   Date of Admission to Acute Care [place]2020  Room Number: T417/00  Rehabilitation Diagnosis: 0005.4 - Amputation: Unilateral Lower Limb Below the Knee (BK)      Immunization History   Administered Date(s) Administered   • Hepatitis B Vaccine (Adol/Adult) 2019, 2020, 2020, 2020   • Influenza Vaccine Adult HD 2019, 2020   • Influenza Vaccine Quad Inj (Pf) 2019   • Pneumococcal Conjugate Vaccine (Prevnar/PCV-13) 2019   • Pneumococcal polysaccharide vaccine (PPSV-23) 2019, 2020     Allergies   Allergen Reactions   • Mircera [Methoxy Polyethylene Glycol-Epoetin Beta] Hives   • Other Drug      \"Opiods\" caused  hallucinations     Past Medical History:   Diagnosis Date   • Arrhythmia     hx a fib   • Arthritis 2020    knees, ankles, back   • CAD (coronary artery disease)     stents   • Chronic anticoagulation 3/26/2020   • Chronic kidney disease (CKD), stage V (HCC)    • Congestive heart failure (HCC)     hx of   • Dental disorder 2020    partial upper   • Diabetes    • Hemodialysis patient (HCC)     Tuesday, Thursday, Saturday   • Hypertension    • Kidney failure    • Myocardial infarct (HCC)     ? 2019    • Osteoarthritis    • Peripheral neuropathy    • Pneumonia     per hx "   • Sleep apnea     does not use cpap anymore     Past Surgical History:   Procedure Laterality Date   • KNEE AMPUTATION BELOW Right 12/31/2020    Procedure: AMPUTATION, BELOW KNEE ...;  Surgeon: Leobardo Lynn M.D.;  Location: SURGERY [facility];  Service: Orthopedics   • TOE AMPUTATION Right 11/16/2020    Procedure: AMPUTATION, TOE GREAT;  Surgeon: Leobardo Lynn M.D.;  Location: SURGERY [facility];  Service: Orthopedics   • TOE AMPUTATION Left 5/17/2020    Procedure: AMPUTATION, TOE GREAT;  Surgeon: Leobardo Lynn M.D.;  Location: [facility];  Service: Orthopedics   • TENOTOMY Left 5/17/2020    Procedure: FLEXOR TENOTOMIES, TWO-FIVE TOES;  Surgeon: Leobardo Lynn M.D.;  Location: SURGERY [facility];  Service: Orthopedics   • APPENDECTOMY     • OTHER CARDIAC SURGERY      stents x1   • OTHER ORTHOPEDIC SURGERY      knee surgery       History Leading to Admission, Conditions that Caused the Need for Rehab (CMS):     Tamiko Luna M.D.   Physician   [facility] Medicine History & Physical Note     Date of Service  12/31/2020     Primary Care Physician  Patito Edgar M.D.     Consultants  Orthopedic surgery- [facility]     Code Status  Prior     Chief Complaint  No chief complaint on file.     History of Presenting Illness  75 y.o. male with DM2, ESRD on HD, PAD, CAD s/p LAD stent, Hyperthyroidism, pAfib on Eliquis, and HLD who presented 12/31/2020 here for a planned right BKA. He was discharged on 11/19 after a right great toe ray amputation on 11/16 for a nonhealing wound. He was being followed up outpatient and when his amputation site wasn't healing secondary to PAD, it was decided that the best course of action would be a BKA.      Today, he is with his wife in pre-op and in no acute distress. He did have his HD session this morning. Endorses some dizziness but thinks it's 2/2 not eating today and having HD. Gentle IVF currently running while he is in  pre-op.           Assessment/Plan:  I anticipate this patient will require at least two midnights for appropriate medical management, necessitating inpatient admission.     * Wound of right foot- (present on admission)  Assessment & Plan  Nonhealing incision s/p right great toe ray amputation in Nov 2020.   - planned BKA today  - Orthopedic surgery following, appreciate recs  - pain control  - post operative ancef  - PT/OT to evaluate     Peripheral vascular disease (HCC)- (present on admission)  Assessment & Plan  Complicating wound healing. Continue to reduce risk factors.  - s/p angiogram and thrombectomy in April 2020  - holding eliquis for BKA today     End stage renal disease on dialysis (HCC)- (present on admission)  Assessment & Plan  On HD- TTS  - consult [facility] Nephrology tomorrow for maintenance HD  - renal/DM diet  - continue phos binder  - EPO per nephro     Type 2 diabetes mellitus (HCC)- (present on admission)  Assessment & Plan  With hyperglycemia, A1C 7.8%.   - lantus 5 U nightly  - DM/renal diet and hypoglycemia protocol  - sensitive sliding scale q6 hrs as he's currently NPO for the surgery     Paroxysmal A-fib (HCC)- (present on admission)  Assessment & Plan  Rate controlled  - resume home coreg tonight, with holding parameters  - resume home eliquis when ok'd by surgery     Dyslipidemia- (present on admission)  Assessment & Plan  Reports that he hasn't been taking his atorvastatin. Lipid panel from 11/14 showed elevated TAGs and LDL at 85  - resume atorvastatin     Essential hypertension- (present on admission)  Assessment & Plan  Normotensive.   - resume home coreg with holding parameters     Hyperthyroidism- (present on admission)  Assessment & Plan  - resume home methimazole                       Jordin Mendoza M.D.   Physician   Nephrology     DATE OF SERVICE:  01/01/2021      REQUESTING PHYSICIAN: Lissy Esteves MD.     CONSULTING PHYSICIAN:  Jordin Mendoza MD.     REASON FOR  CONSULTATION:  The patient with end-stage renal disease, admitted   for right below-the-knee amputation         HISTORY OF PRESENT ILLNESS:  A 75-year-old male with end-stage renal disease   on chronic hemodialysis Tuesdays, Thursdays and Saturdays.  The patient was   admitted on 12/31 for right below-the-knee amputation.  He had wound and   gangrene of the right foot.  He has had some peripheral vascular procedures   previously.  He did have a right ray amputation in 11/2020.  He has had a   nonhealing wound and he was admitted for right below-the-knee amputation that   was performed on 12/31.  We have been consulted to assist in his dialysis   needs.  He had dialysis on 12/31 prior to admission.  Today, he is feeling   dyspneic.  He has no cough or sputum production.      LABORATORY DATA:  No labs available today.     IMPRESSION:  1.  End-stage renal disease, on chronic hemodialysis Tuesday, Thursday, and   Saturday.  2.  Dyspnea.  I suspect the patient may have some element of fluid overload.    I am not sure how much fluid that he get after surgery.  3.  Status post right below-the-knee amputation on 12/31/2020.  4.  Hypertension, controlled.  5.  Anemia of chronic kidney disease.  6.  Diabetes mellitus.  7.  Chronic kidney disease - metabolic bone disorder.  Managed at the dialysis   unit.  8.  Peripheral vascular disease.  9.  Paroxysmal atrial fibrillation, on Eliquis.  10.  Coronary artery disease, status post stents.  Asymptomatic.  11.  History of hyperthyroidism.  12.  Dyslipidemia, on statins.  13.  Past history of heavy tobacco use.     PLAN:  1.  We are going to proceed with a short dialysis today to remove some fluids   and see if that helps his respiratory status.  2.  We will continue his chronic dialysis otherwise on Tuesday, Thursday and   Saturday.  We will dialyze him tomorrow again.  He dialyzes for 3-1/2 hours.  3.  We are going to ultrafiltrate as tolerated.  4.  We are going to check basic  metabolic panel today prior to dialysis.  5.  He needs to have blood rechecked tomorrow.  6.  Continue home medicines.  7.  No dietary protein restrictions.  8.  Adjust all of his medicines for a GFR less than 10.     Thanks for this consultation.  We will follow with you.           ______________________________  MD Drew BUCHANAN M.D.   Physician   Physical Medicine & Rehab       Physical Medicine and Rehabilitation Consultation                                                                                      Initial Consult        Initial Consultation Date: 1/4/2021  Consulting provider: Dr. Elliott Esteves  Reason for consultation: assess for acute inpatient rehab appropriateness  LOS: 4 Day(s)        Chief complaint: BKA        HPI:   The patient is a 75 y.o. male with a past medical history of DM2, ESRD on HD, PAD, CAD s/p LAD stent, hyperthyroidism, PAF on eliquis, HL;  who presented on 12/31/2020  2:20 PM with planned right BKA. Recent history of right great toe amputation on 11/16/20 for a nonhealing wound. Due to non-healing amputation wound on follow up outpatient visit, admitted for BKA. For right lower extremity osteomyelitis s/p right BKA on 12/31/20 with Dr. Leobardo Lynn. NWB, knee immobilizer. Hospital course with ESRD on HD, hyponatremia, anemia, drain management, and monitoring for appropriate time to resume anticoagulation in setting of PAF, CAD s/p stent, anemia, and recent surgery. Tunneled dialysis cath removed 1/4; to use AV fistula for HD.           Social Hx:  Pre-morbidly, this patient lived in:   1 story home  With 0 steps to enter  Ex wife/son can assist           Current level of function:   PT, 1/1/21: Min A FWW x2 ft; Superv bed mobility/Transfers  OT, 1/1/21: Superv for bed mobility and ADLs; Min A for bathing; Max A for toileting         Restrictions: NWB RLE              PMH:  Past Medical History        Past Medical History:   Diagnosis Date   •  Arrhythmia       hx a fib   • Arthritis 12/29/2020     knees, ankles, back   • CAD (coronary artery disease)       stents   • Chronic anticoagulation 3/26/2020   • Chronic kidney disease (CKD), stage V (HCC)     • Congestive heart failure (HCC)       hx of   • Dental disorder 12/29/2020     partial upper   • Diabetes     • Hemodialysis patient (HCC)       Tuesday, Thursday, Saturday   • Hypertension     • Kidney failure     • Myocardial infarct (HCC)       ? 2019    • Osteoarthritis     • Peripheral neuropathy     • Pneumonia       per hx   • Sleep apnea       does not use cpap anymore               PSH:  Past Surgical History         Past Surgical History:   Procedure Laterality Date   • KNEE AMPUTATION BELOW Right 12/31/2020     Procedure: AMPUTATION, BELOW KNEE ...;  Surgeon: Leobardo Lynn M.D.;  Location: [facility];  Service: Orthopedics   • TOE AMPUTATION Right 11/16/2020     Procedure: AMPUTATION, TOE GREAT;  Surgeon: Leobardo Lynn M.D.;  Location: [facility];  Service: Orthopedics   • TOE AMPUTATION Left 5/17/2020     Procedure: AMPUTATION, TOE GREAT;  Surgeon: Leobardo Lynn M.D.;  Location: [facility];  Service: Orthopedics   • TENOTOMY Left 5/17/2020     Procedure: FLEXOR TENOTOMIES, TWO-FIVE TOES;  Surgeon: Leobardo Lynn M.D.;  Location: [facility];  Service: Orthopedics   • APPENDECTOMY       • OTHER CARDIAC SURGERY         stents x1   • OTHER ORTHOPEDIC SURGERY         knee surgery               FHX: Reviewed.  Family History         Family History   Problem Relation Age of Onset   • Heart Disease Mother     • Alcohol/Drug Father     • Thyroid Son     • Diabetes Brother     • Hypertension Neg Hx     • Hyperlipidemia Neg Hx                             Medications:       Current Facility-Administered Medications   Medication Dose   • metoprolol (LOPRESSOR) tablet 37.5 mg  37.5 mg   • [START ON 1/5/2021] epoetin (Retacrit) injection (Dialysis use  "only) 10,000 Units  10,000 Units   • benzocaine-menthol (CEPACOL) lozenge 1 Lozenge  1 Lozenge   • HYDROcodone-homatropine 5-1.5 mg/5 mL solution 5 mL  5 mL   • morphine (pf) 4 mg/mL injection 2 mg  2 mg   • morphine ER (MS CONTIN) tablet 15 mg  15 mg   • oxyCODONE immediate release (ROXICODONE) tablet 10 mg  10 mg   • calcitRIOL (ROCALTROL) capsule 0.25 mcg  0.25 mcg   • calcium acetate (PHOS-LO) 667 MG tablet 1,334 mg  1,334 mg   • insulin glargine (Lantus) injection  5 Units   • methimazole (TAPAZOLE) tablet 7.5 mg  7.5 mg   • acetaminophen (Tylenol) tablet 650 mg  650 mg   • ondansetron (ZOFRAN) syringe/vial injection 4 mg  4 mg   • ondansetron (ZOFRAN ODT) dispertab 4 mg  4 mg   • insulin regular (HumuLIN R,NovoLIN R) injection  1-6 Units     And   • glucose 4 g chewable tablet 16 g  16 g     And   • dextrose 50% (D50W) injection 50 mL  50 mL   • atorvastatin (LIPITOR) tablet 40 mg  40 mg   • methimazole (TAPAZOLE) tablet 5 mg  5 mg   • omeprazole (PRILOSEC) capsule 20 mg  20 mg         Allergies:        Allergies   Allergen Reactions   • Mircera [Methoxy Polyethylene Glycol-Epoetin Beta] Hives   • Other Drug         \"Opiods\" caused  hallucinations            Vitals: /63   Pulse (!) 115   Temp 37.3 °C (99.2 °F) (Oral)   Resp 18   Ht 1.676 m (5' 6\")   Wt 78.3 kg (172 lb 9.9 oz)   SpO2 93%       Labs: Reviewed and significant for 1/4/21: Hgb 7, Na 128, Cr 5.74        Recent Labs     01/03/21  0051 01/04/21  0651 01/04/21  1159   RBC 2.04* 2.35* 2.08*   HEMOGLOBIN 6.6* 7.7* 7.0*   HEMATOCRIT 20.9* 23.5* 20.7*   PLATELETCT 312 340 301           Recent Labs     01/03/21  0051 01/04/21  0651   SODIUM 132* 128*   POTASSIUM 4.1 4.7   CHLORIDE 95* 91*   CO2 27 21   GLUCOSE 108* 103*   BUN 16 32*   CREATININE 3.40* 5.74*   CALCIUM 9.0 9.2      Recent Results         Recent Results (from the past 24 hour(s))   ACCU-CHEK GLUCOSE     Collection Time: 01/03/21  6:38 PM   Result Value Ref Range     Glucose - " Accu-Ck 96 65 - 99 mg/dL   ACCU-CHEK GLUCOSE     Collection Time: 01/03/21  9:49 PM   Result Value Ref Range     Glucose - Accu-Ck 105 (H) 65 - 99 mg/dL   ACCU-CHEK GLUCOSE     Collection Time: 01/04/21  5:53 AM   Result Value Ref Range     Glucose - Accu-Ck 95 65 - 99 mg/dL   CBC WITH DIFFERENTIAL     Collection Time: 01/04/21  6:51 AM   Result Value Ref Range     WBC 15.5 (H) 4.8 - 10.8 K/uL     RBC 2.35 (L) 4.70 - 6.10 M/uL     Hemoglobin 7.7 (L) 14.0 - 18.0 g/dL     Hematocrit 23.5 (L) 42.0 - 52.0 %     .0 (H) 81.4 - 97.8 fL     MCH 32.8 27.0 - 33.0 pg     MCHC 32.8 (L) 33.7 - 35.3 g/dL     RDW 58.2 (H) 35.9 - 50.0 fL     Platelet Count 340 164 - 446 K/uL     MPV 8.6 (L) 9.0 - 12.9 fL     Neutrophils-Polys 87.50 (H) 44.00 - 72.00 %     Lymphocytes 5.10 (L) 22.00 - 41.00 %     Monocytes 6.10 0.00 - 13.40 %     Eosinophils 0.00 0.00 - 6.90 %     Basophils 0.30 0.00 - 1.80 %     Immature Granulocytes 1.00 (H) 0.00 - 0.90 %     Nucleated RBC 0.00 /100 WBC     Neutrophils (Absolute) 13.51 (H) 1.82 - 7.42 K/uL     Lymphs (Absolute) 0.79 (L) 1.00 - 4.80 K/uL     Monos (Absolute) 0.95 (H) 0.00 - 0.85 K/uL     Eos (Absolute) 0.00 0.00 - 0.51 K/uL     Baso (Absolute) 0.04 0.00 - 0.12 K/uL     Immature Granulocytes (abs) 0.16 (H) 0.00 - 0.11 K/uL     NRBC (Absolute) 0.00 K/uL   Comp Metabolic Panel     Collection Time: 01/04/21  6:51 AM   Result Value Ref Range     Sodium 128 (L) 135 - 145 mmol/L     Potassium 4.7 3.6 - 5.5 mmol/L     Chloride 91 (L) 96 - 112 mmol/L     Co2 21 20 - 33 mmol/L     Anion Gap 16.0 7.0 - 16.0     Glucose 103 (H) 65 - 99 mg/dL     Bun 32 (H) 8 - 22 mg/dL     Creatinine 5.74 (HH) 0.50 - 1.40 mg/dL     Calcium 9.2 8.5 - 10.5 mg/dL     AST(SGOT) 21 12 - 45 U/L     ALT(SGPT) <5 2 - 50 U/L     Alkaline Phosphatase 107 (H) 30 - 99 U/L     Total Bilirubin 0.5 0.1 - 1.5 mg/dL     Albumin 3.5 3.2 - 4.9 g/dL     Total Protein 6.8 6.0 - 8.2 g/dL     Globulin 3.3 1.9 - 3.5 g/dL     A-G Ratio 1.1 g/dL    ESTIMATED GFR     Collection Time: 01/04/21  6:51 AM   Result Value Ref Range     GFR If  12 (A) >60 mL/min/1.73 m 2     GFR If Non African American 10 (A) >60 mL/min/1.73 m 2   Histology Request     Collection Time: 01/04/21  8:03 AM   Result Value Ref Range     Pathology Request Sent to Histo     ACCU-CHEK GLUCOSE     Collection Time: 01/04/21 11:32 AM   Result Value Ref Range     Glucose - Accu-Ck 92 65 - 99 mg/dL   CBC WITH DIFFERENTIAL     Collection Time: 01/04/21 11:59 AM   Result Value Ref Range     WBC 17.3 (H) 4.8 - 10.8 K/uL     RBC 2.08 (L) 4.70 - 6.10 M/uL     Hemoglobin 7.0 (L) 14.0 - 18.0 g/dL     Hematocrit 20.7 (L) 42.0 - 52.0 %     MCV 99.5 (H) 81.4 - 97.8 fL     MCH 33.7 (H) 27.0 - 33.0 pg     MCHC 33.8 33.7 - 35.3 g/dL     RDW 56.7 (H) 35.9 - 50.0 fL     Platelet Count 301 164 - 446 K/uL     MPV 8.5 (L) 9.0 - 12.9 fL     Neutrophils-Polys 87.60 (H) 44.00 - 72.00 %     Lymphocytes 5.30 (L) 22.00 - 41.00 %     Monocytes 6.10 0.00 - 13.40 %     Eosinophils 0.00 0.00 - 6.90 %     Basophils 0.20 0.00 - 1.80 %     Immature Granulocytes 0.80 0.00 - 0.90 %     Nucleated RBC 0.00 /100 WBC     Neutrophils (Absolute) 15.10 (H) 1.82 - 7.42 K/uL     Lymphs (Absolute) 0.92 (L) 1.00 - 4.80 K/uL     Monos (Absolute) 1.05 (H) 0.00 - 0.85 K/uL     Eos (Absolute) 0.00 0.00 - 0.51 K/uL     Baso (Absolute) 0.04 0.00 - 0.12 K/uL     Immature Granulocytes (abs) 0.14 (H) 0.00 - 0.11 K/uL     NRBC (Absolute) 0.00 K/uL                  Imaging:  Ct-cta Chest Pulmonary Artery W/ Recons  Result Date: 12/7/2020 12/7/2020 9:12 PM HISTORY/REASON FOR EXAM:  Shortness of breath, cough and dizziness. TECHNIQUE/EXAM DESCRIPTION: CT angiogram scan for pulmonary embolism with contrast, with reconstructions. 1.25 mm helical sections were obtained from the lung apices through the lung bases following the rapid bolus administration of 171 mL of Omnipaque 350 nonionic contrast. Thin-section overlapping reconstruction  interval was utilized. Coronal reconstructions were generated from the axial data. MIP post processing was performed and utilized for the interpretation. Low dose optimization technique was utilized for this CT exam including automated exposure control and adjustment of the mA and/or kV according to patient size. COMPARISON: 3/29/2014 FINDINGS: There is emphysematous change of the lungs. There are areas of bullous change seen as well. There are areas of pulmonary scarring within the left lung base. There is extensive pleural calcification on the left most probably posteriorly and laterally inferiorly within the left lung. Infiltrated contrast is seen extending into the right chest. There is no evidence of pericardial effusion. There are gallstones within the gallbladder. There are hepatic granulomas. There multiple small hepatic cysts. The spleen is enlarged. Kidneys are atrophic. There is no evidence of filling defect within the pulmonary arterial system to suggest presence of pulmonary embolus. There is an appearance of the spine suggesting presence of either ankylosing spondylitis or diffuse idiopathic skeletal hyperostosis.      1.  No evidence of pulmonary embolus. 2.  Emphysematous change of the lungs. 3.  Again seen scarring within the left lung base with extensive pleural calcification. 4.  Gallstones. 5.  Splenomegaly. 6.  Renal cortical thinning bilaterally.                    ASSESSMENT:  Patient is a 75 y.o. male admitted for right lower extremity osteomyelitis s/p right BKA on 12/31/20 with Dr. Leobardo Lynn. Hospital course with ESRD on HD, hyponatremia, anemia, drain management, and monitoring for appropriate time to resume anticoagulation in setting of PAF, CAD s/p stent, anemia, and recent surgery. Recent history of right great toe amputation on 11/16/20 for a nonhealing wound.         #Rehab:   -Vitals: stable, RA  -Insurance: Medicare/[employer]  -Discharge support: ex wife/son  -Medical  complexity: BKA, DM, ESRD on HD, PAD, CAD s/p stent, PAF on AC; anemia with Hgb 7 on 1/4 requiring lab checks  -Rehab Impairment Code: 0005.4 - Amputation: Unilateral Lower Limb Below the Knee (BK)  -Reason for admission: Wound of right foot  -When medically cleared, meets criteria for IRF         #Ortho: right lower extremity osteomyelitis s/p right BKA on 12/31/20 with Dr. Leobardo Lynn  -NWB, knee immobilizer  -eliquis when anemia improves  -drain in right knee     #ID: right foot wound, non healing, history of right great toe amputation on 11/16/20     #CV: HTN, PAD, CAD s/p stent, PAF on AC at home  -atorvastatin, metoprolol     #Anemia: Hgb 7 on 1/4/21; of note, has CKD  -monitor     #Supp: calcitriol      #DM: SSI, lantus     #Renal: ESRD on HD (Tu/Thu/Sa); Tunneled dialysis cath removed 1/4; to use AV fistula for HD     #Pain: PRN IV morphine; MS contin      #GI: omeprazole     #Hyponatremia: Na 128 on 1/4 <= 132 on 1/3  -monitor     #Hyperthyroidism: methimazole     #Bowel: last BM on 1/2     #Bladder: voiding      #DVT PPX:   -SCDs  -home apixaban on hold due to Hgb 7 on 1/4/21      Thank you for allowing us to participate in the care of this patient.            Drew Gr MD  Physical Medicine and Rehabilitation   1/4/2021                    Leobardo Lynn M.D.   Physician   Surgery Orthopedic     DATE OF OPERATION: 12/31/2020     PREOPERATIVE DIAGNOSIS: right lower extremity osteomyelitis     POSTOPERATIVE DIAGNOSIS: Same     PROCEDURE PERFORMED: right below knee amputation     SURGEON: Leobardo Lynn M.D.     ANESTHESIOLOGIST: Souleymane Salinas MD.      ANESTHESIA: General  INDICATIONS: The patient is a 75 y.o.-year-old male who presents with a dysvascular lower extremity with gangrene.  Given their radiographic and exam findings, the patient is an appropriately indicated candidate for right below knee amputation.  I discussed the risks, benefits, and alternatives of surgery with the patient.  Risks  discussed included failure to clear the infection, wound healing complication, need for additional surgery including more proximal amputation, neurovascular injury, blood loss, phantom limb sensation or pain, DVT/PE, and the medical risks of anesthesia (including stroke, MI, and death).  Benefits discussed included improved chance of clearance of the infection and improved function.  The patient is in agreement with the plan to proceed, and their informed consent was signed and documented in the medical record.  I met with the patient pre-operatively and marked the operative extremity with their agreement.  He was taken to the operating room.        Post-Operative Plan:     1.  NWB operative extremity, knee immobilizer when at rest  2.  DVT prophylaxis - SCD's, chemical per medicine  3.  Remove drain when output < 30 mL/24 hours  4.  Antibiotics - Per ID recs  5.  Discharge planning     ____________________________________        IMAGING STUDIES:  Ct-cta Chest Pulmonary Artery W/ Recons  Result Date: 12/7/2020 12/7/2020 9:12 PM HISTORY/REASON FOR EXAM:  Shortness of breath, cough and dizziness. TECHNIQUE/EXAM DESCRIPTION: CT angiogram scan for pulmonary embolism with contrast, with reconstructions. 1.25 mm helical sections were obtained from the lung apices through the lung bases following the rapid bolus administration of 171 mL of Omnipaque 350 nonionic contrast. Thin-section overlapping reconstruction interval was utilized. Coronal reconstructions were generated from the axial data. MIP post processing was performed and utilized for the interpretation. Low dose optimization technique was utilized for this CT exam including automated exposure control and adjustment of the mA and/or kV according to patient size. COMPARISON: 3/29/2014 FINDINGS: There is emphysematous change of the lungs. There are areas of bullous change seen as well. There are areas of pulmonary scarring within the left lung base. There is  extensive pleural calcification on the left most probably posteriorly and laterally inferiorly within the left lung. Infiltrated contrast is seen extending into the right chest. There is no evidence of pericardial effusion. There are gallstones within the gallbladder. There are hepatic granulomas. There multiple small hepatic cysts. The spleen is enlarged. Kidneys are atrophic. There is no evidence of filling defect within the pulmonary arterial system to suggest presence of pulmonary embolus. There is an appearance of the spine suggesting presence of either ankylosing spondylitis or diffuse idiopathic skeletal hyperostosis.      1.  No evidence of pulmonary embolus. 2.  Emphysematous change of the lungs. 3.  Again seen scarring within the left lung base with extensive pleural calcification. 4.  Gallstones. 5.  Splenomegaly. 6.  Renal cortical thinning bilaterally.     Co-morbidities: Please see below  Potential Risk - Complications: Deep Vein Thrombosis, Pain, Pneumonia, Pressure Ulcer and Urinary Tract Infection  Level of Risk: High    Ongoing Medical Management Needed (Medical/Nursing Needs):   Patient Active Problem List    Diagnosis Date Noted   • Wound of right foot 11/14/2020     Priority: High   • Dysphagia 02/18/2020     Priority: High   • NSTEMI (non-ST elevated myocardial infarction) (Formerly [facility]) 11/11/2019     Priority: High   • Polyarthritis 06/25/2018     Priority: High   • Peripheral vascular disease (Former[facility]) 04/23/2020     Priority: Medium   • Elevated troponin 11/28/2019     Priority: Medium   • Type 2 diabetes mellitus (Former[facility]) 08/13/2019     Priority: Medium   • End stage renal disease on dialysis (Former[facility]) 08/13/2019     Priority: Medium   • Anemia 08/15/2018     Priority: Medium   • Paroxysmal A-fib (Former[facility]) 11/16/2019     Priority: Low   • Secondary hyperparathyroidism of renal origin ([facility]) 11/15/2019     Priority: Low   • Dyslipidemia 04/11/2019     Priority: Low   • Essential hypertension 08/15/2018      "Priority: Low   • Gastroesophageal reflux disease 08/15/2018     Priority: Low   • Hyperthyroidism 06/25/2018     Priority: Low   • CAD (coronary artery disease) 06/25/2018     Priority: Low   • Coronary artery disease, non-occlusive 06/02/2020   • Cardiomyopathy, ischemic 06/02/2020   • Anticoagulated 06/02/2020   • Left anterior fascicular block 06/02/2020   • Right bundle branch block 06/02/2020   • Diastolic dysfunction 03/26/2020   • Presence of stent in LAD coronary artery 03/26/2020   • Hypotension due to hypovolemia 12/02/2019   • Thyroid nodule 04/11/2019   • Health care maintenance 08/15/2018   • Hepatic cyst 08/02/2018   • Renal cyst 08/02/2018       Current Vital Signs:   Temperature: 37.3 °C (99.2 °F) Pulse: (!) 115(RN notified) Respiration: 18 Blood Pressure : 140/63  Weight: 78.3 kg (172 lb 9.9 oz) Height: 167.6 cm (5' 6\")  Pulse Oximetry: 93 % O2 (LPM): 0      Completed Laboratory Reports:  Recent Labs     01/01/21  1756 01/01/21  2155 01/02/21  0638 01/02/21  1404 01/02/21  1851 01/02/21  2240 01/03/21  0051 01/03/21  0610 01/03/21  1225 01/03/21  1838 01/03/21  2149 01/04/21  0553 01/04/21  0651 01/04/21  1132 01/04/21  1159   WBC  --   --   --   --   --   --  9.8  --   --   --   --   --  15.5*  --  17.3*   HEMOGLOBIN  --   --   --   --   --   --  6.6*  --   --   --   --   --  7.7*  --  7.0*   HEMATOCRIT  --   --   --   --   --   --  20.9*  --   --   --   --   --  23.5*  --  20.7*   PLATELETCT  --   --   --   --   --   --  312  --   --   --   --   --  340  --  301   SODIUM  --   --   --   --   --   --  132*  --   --   --   --   --  128*  --   --    POTASSIUM  --   --   --   --   --   --  4.1  --   --   --   --   --  4.7  --   --    BUN  --   --   --   --   --   --  16  --   --   --   --   --  32*  --   --    CREATININE  --   --   --   --   --   --  3.40*  --   --   --   --   --  5.74*  --   --    ALBUMIN  --   --   --   --   --   --  3.5  --   --   --   --   --  3.5  --   --    GLUCOSE  --   --   " --   --   --   --  108*  --   --   --   --   --  103*  --   --    POCGLUCOSE 134* 160* 132* 126* 142* 120*  --  98 110* 96 105* 95  --  92  --      Additional Labs: COVID TEST WET SWAB TEST ORDERED;  PENDING RESULTS.    Results for SEGUN BRADSHAW (MRN [id number]) as of 1/4/2021 13:56   Ref. Range 12/29/2020 14:00   COVID Order Status Unknown Received   SARS-CoV-2 by PCR Unknown NotDetected   SARS-CoV-2 Source Unknown NP Swab         Prior Living Situation:   Housing / Facility: 1 Story House  Steps Into Home: 0  Steps In Home: 0  Lives with - Patient's Self Care Capacity: Other (Comments)(staying at Ex wife's house, son staying to help.)  Equipment Owned: Wheelchair    Prior Level of Function / Living Situation:   Physical Therapy: Prior Services: None  Housing / Facility: 1 Story House  Steps Into Home: 0  Steps In Home: 0  Elevator: No  Bathroom Set up: Walk In Shower(uses walk in shower in ex spouses room)  Equipment Owned: Wheelchair  Lives with - Patient's Self Care Capacity: Other (Comments)(staying at Ex wife's house, son staying to help.)  Bed Mobility: Independent  Transfer Status: Independent  Distance Ambulation (Feet): (scooting around in w/c last 2 weeks)  Assistive Devices Used: Wheelchair  Wheelchair: Independent  Current Level of Function:   Gait Level Of Assist: Minimal Assist  Assistive Device: Front Wheel Walker  Distance (Feet): 2(hop and pivot on L heel)  # of Stairs Climbed: 0  Weight Bearing Status: NWB R LE  Supine to Sit: Supervised  Sit to Supine: Supervised  Scooting: Supervised  Rolling: Supervised  Sit to Stand: Supervised  Bed, Chair, Wheelchair Transfer: Supervised  Transfer Method: Stand Pivot  Sitting in Chair: 20  Sitting Edge of Bed: 15 mins  Standing: 3  Occupational Therapy:   Self Feeding: Independent  Grooming / Hygiene: Independent  Bathing: Independent  Dressing: Independent  Toileting: Independent  Medication Management: Independent  Laundry: Independent  Kitchen  Mobility: Independent  Finances: Independent  Home Management: Independent  Shopping: Independent  Prior Level Of Mobility: Uses Wheel Chair for in Home Mobility  Driving / Transportation: Relatives / Others Provide Transportation(ex wife drives)  Prior Services: None  Housing / Facility: [address]  Current Level of Function:   Eating: Supervision  Bathing: Minimal Assist(seated EOB)  Upper Body Dressing: Supervision  Toileting: Maximal Assist  Speech Language Pathology: n/a      Rehabilitation Prognosis/Potential: Good  Estimated Length of Stay: 10- 14  days    Nursing:   Orientation : Oriented x 4  Continent     Scope/Intensity of Services Recommended:  Physical Therapy: 1.5  hr / day  5 days / week. Therapeutic Interventions Required: Maximize Endurance, Mobility, Strength and Safety  Occupational Therapy: 1. 5 hr / day 5 days / week. Therapeutic Interventions Required: Maximize Self Care, ADLs, IADLs, Energy Conservation and family training  Rehabilitation Nursin/7:  Please see below.   Rehabilitation Physician: 3 - 5 days / week. Therapeutic Interventions Required: Medical Management  Respiratory Care: Eval and Tx. Therapeutic Interventions Required: Per Protocol.  Dietician: Eval and Tx. Therapeutic Interventions Required: Per protocol    He/She requires 24-hour rehabilitation nursing to manage bowel and bladder function, skin care, surgical incision, nutrition and fluid intake, pain control, safety, medication management and patient/family goals. In addition, rehabilitation nursing will reiterate and reinforce therapy skills and equipment use, including ADLs, as well as provide education to the patient and family. He/sheis willing to participate in and is able to tolerate the proposed plan of care.    Rehabilitation Goals and Plan (Expected frequency & duration of treatment in the IRF):   Return to the Community  Anticipated Date of Rehabilitation Admission: 2020  Patient/Family oriented IRF  level of care/facility/plan: Yes  Patient/Family willing to participate in IRF care/facility/plan: YES  Patient able to tolerate IRF level of care proposed: YES  Patient has potential to benefit IRF level of care proposed: yes      Special Needs or Precautions - Medical Necessity:  Fall Risk, Non Weight Bearing Right Lower Extremity, Immobilizer Right Lower Extremity     Diet:   DIET ORDERS (From admission to next 24h)     Start     Ordered    12/31/20 1800  Diet Order Diet: Consistent CHO (Diabetic); Second Modifier: (optional): Renal  ALL MEALS     Question Answer Comment   Diet: Consistent CHO (Diabetic)    Second Modifier: (optional) Renal        12/31/20 1651                Anticipated Discharge Destination / Patient/Family Goal:  Destination: Home with Assistance Support System: Son and ex wife are staying with pt to assist w/ transition home.  Anticipated home health services: OT, PT, Nursing and Aide  Previously used HH service/ provider: Not Applicable  Anticipated DME Needs: PT has a wc @ home; anticipate FWW  Outpatient Services: please see below  Alternative resources to address additional identified needs:    [facility]   [address]  [place] [address]     Schedule: Tuesday, Thursday, Saturday   Time: 09:45am     Patient is seen by Dr. Tomlin in HD clinic.     Pre-Screen Completed: 1/4/2021 2:03 PM Lenora Jimenez

## 2021-01-05 PROBLEM — Z79.4 TYPE 2 DIABETES MELLITUS WITH KIDNEY COMPLICATION, WITH LONG-TERM CURRENT USE OF INSULIN (HCC): Status: ACTIVE | Noted: 2019-08-13

## 2021-01-05 PROBLEM — L97.509 DIABETIC FOOT ULCER (HCC): Status: ACTIVE | Noted: 2020-01-01

## 2021-01-05 PROBLEM — E11.621 DIABETIC FOOT ULCER (HCC): Status: ACTIVE | Noted: 2020-01-01

## 2021-01-05 PROBLEM — J96.01 ACUTE RESPIRATORY FAILURE WITH HYPOXIA (HCC): Status: ACTIVE | Noted: 2021-01-01

## 2021-01-05 PROBLEM — E11.29 TYPE 2 DIABETES MELLITUS WITH KIDNEY COMPLICATION, WITH LONG-TERM CURRENT USE OF INSULIN (HCC): Status: ACTIVE | Noted: 2019-08-13

## 2021-01-05 NOTE — PROGRESS NOTES
Bedside report received.  Assessment complete.  A&O x 4. Patient calls appropriately.  Patient mobilizes with max assist. Bed alarm on.   Patient has 2/10 pain. Declines pain or medications needed at this time.  Denies N&V. Tolerating renal diabetic diet.  Surgical site to right leg, BKA dressing and immobilizer in place, CDI. Piko drain to right leg.  + void, + flatus, + BM.  Patient denies SOB.  SCD's off.  Review plan with of care with patient. Call light and personal belongings with in reach. Hourly rounding in place. All needs met at this time.

## 2021-01-05 NOTE — DISCHARGE PLANNING
Anticipated Discharge Disposition: IRF    Action:   · Completed chart review  · RN CM provided education for pt at bedside regarding IRF services and facilities. Pt is agreeable to IRF placement. Obtained verbal consent for IRF as follows: 1. [facility]F. Faxed choice to [facility]    Barriers to Discharge:   · Medical Clearance  · Acceptance to [facility] IRF    Plan: Continue to collaborate with the pt, pt's family, and health care team to provide social and discharge support as needed.

## 2021-01-05 NOTE — THERAPY
"Physical Therapy   Daily Treatment     Patient Name: Luis Sepulveda  Age:  75 y.o., Sex:  male  Medical Record #: [id number]  Today's Date: 1/4/2021     Precautions: Fall Risk, Immobilizer Right Lower Extremity    Assessment    Pt appeared to be most limited today by feeling of being unwell. He reported feeling \"fuzzy\" and did appear somewhat off with regards to his cognition. BP taken to be 107/53 and Hgb 7mg/dL (per chart). Pt able to transition from supine to sit utilizing bed features at [facility], and once seated, remained so at [facility]. Pt performed basic LE exercises and PT re-wrapped ace wrap around residual limb. Pt became increasingly nauseated and was returned to supine after brief stand. RN notified. WIll continue to follow to address ROM impairments, weakness, and gait deviations.     Plan    Continue current treatment plan.    DC Equipment Recommendations: Unable to determine at this time  Discharge Recommendations: Recommend post-acute placement for additional physical therapy services prior to discharge home       Objective       01/04/21 1608   Balance   Comments Min A in standing for safety   Gait Analysis   Gait Level Of Assist   (deferred; pt feeling unwell)   Bed Mobility    Comments /53mmHg. While seated EOB, pt reporting nausea and generalized feeling of being unwell. Minimal exercises performed. Ultimately, PT re-wrapped pt's residual limb and had pt stand to be re-positioned for supine. RN notified.    Functional Mobility   Sit to Stand Minimal Assist   Bed, Chair, Wheelchair Transfer   (deferred)   Short Term Goals    Short Term Goal # 1 pt will perform bed mobility with flat bed, no rail in 6 visits.    Goal Outcome # 1 goal not met   Short Term Goal # 2 Pt will transfer with mod indep in 6 visits to improve functional indep.    Goal Outcome # 2 Goal not met   Short Term Goal # 3 Pt will ambulate x 50 feet using fWW with supervision in 6 viits.    Goal Outcome # 3 Goal not met   Short " Term Goal # 4 Pt will show independent understanding of therex for BKA in 6 visits.    Goal Outcome # 4 Goal not met   Anticipated Discharge Equipment and Recommendations   DC Equipment Recommendations Unable to determine at this time   Discharge Recommendations Recommend post-acute placement for additional physical therapy services prior to discharge home

## 2021-01-05 NOTE — PROGRESS NOTES
Bedside report received. Assessment completed.  Pt is A&O x4. Pt on 2L via nc.   Pt denies pain at this time.  Denies nausea.   - numbness, - tingling.  Skin has R BKA, RU Chest dressing, LUE fistula   LDA AV fistula LUE   Last BM 1/2/21 . +flatus,   +void.  Diabetic/Renal  diet. Tolerates well.   Pt up x1.  Abdomen taut and firm  Call light and belongings within reach. All needs met at this time. Fall Precautions and hourly rounding in place.

## 2021-01-05 NOTE — DISCHARGE PLANNING
Agency/Facility Name: [facility]  Outcome: No voicemail available CCA will call back later

## 2021-01-05 NOTE — DISCHARGE PLANNING
Agency/Facility Name: [facility]  Spoke To: Zainab  Outcome: Pt. Accepted but not yet medically clear yet  RNCM Edelmira notified

## 2021-01-05 NOTE — DISCHARGE PLANNING
Anticipated Discharge Disposition:   · [facility]F    Action:   · Completed chart review and discussed pt's POC in IDT rounds    Barriers to Discharge:   · Medical clearance- pending CXR, follow up labs post transfusion    Plan:   · Continue to collaborate with the pt, pt's family, and health care team to provide social and discharge support as needed.

## 2021-01-05 NOTE — PROGRESS NOTES
"[facility] -  PROGRESS NOTE               Author: Mario Hunter M.D. Date & Time: 1/5/2021  8:21 AM     HPI:   A 75-year-old male with end-stage renal disease   on chronic hemodialysis Tuesdays, Thursdays and Saturdays.  The patient was   admitted on 12/31 for right below-the-knee amputation.  He had wound and   gangrene of the right foot.  He has had some peripheral vascular procedures   previously.  He did have a right ray amputation in 11/2020.  He has had a   nonhealing wound and he was admitted for right below-the-knee amputation that   was performed on 12/31.  We have been consulted to assist in his dialysis   needs.  He had dialysis on 12/31 prior to admission.  Today, he is feeling   dyspneic.  He has no cough or sputum production.    DAILY NEPHROLOGY SUMMARY:  1/1 - Consult seen  1/2 - Tolerated HD yesterday via TDC  1/3 - Tolerated HD via AVF.  No issues per HD nurse.  Complains of itchiness this morning.  Requesting benadryl  1/4: No acute events, pending rehab placement, tired this am, sedated.   1/5: Pending transfer to inpatient rehab when medically cleared, permacath removed     REVIEW OF SYSTEMS:    10 point ROS reviewed and is as per HPI/daily summary or otherwise negative    PMH/PSH/SH/FH: Reviewed and unchanged since admission note  CURRENT MEDICATIONS: Reviewed from admission to present day    PHYSICAL EXAM:  VS:  /48   Pulse 84   Temp 36.3 °C (97.3 °F) (Temporal)   Resp 16   Ht 1.676 m (5' 6\")   Wt 78.3 kg (172 lb 9.9 oz)   SpO2 100%   BMI 27.86 kg/m²   GENERAL: no acute distress  CV: trace edema   RESP: non-labored  GI: Soft  MSK: R. BKA   SKIN: No concerning rashes  NEURO: AOx3  PSYCH: Cooperative    Fluids:  In: 1400 [P.O.:1100; Blood:300]  Out: -     LABS:  Recent Labs     01/03/21  0051 01/04/21  0651 01/05/21  0051   SODIUM 132* 128* 125*   POTASSIUM 4.1 4.7 4.7   CHLORIDE 95* 91* 87*   CO2 27 21 22   GLUCOSE 108* 103* 100*   BUN 16 32* 42* "   CREATININE 3.40* 5.74* 6.49*   CALCIUM 9.0 9.2 9.6       IMPRESSION:  #  End-stage renal disease, on chronic hemodialysis Tuesday, Thursday, and   Saturday.    #  Status post right below-the-knee amputation on 12/31/2020.  #  Hypertension, controlled.  #  Anemia of chronic kidney disease.  #  Diabetes mellitus.  #  Chronic kidney disease - metabolic bone disorder.  Managed at the dialysis   unit.  #  Peripheral vascular disease.  #  Paroxysmal atrial fibrillation, on Eliquis.  #  Coronary artery disease, status post stents.  Asymptomatic.  #  History of hyperthyroidism.  #  Dyslipidemia, on statins.  #  Past history of heavy tobacco use.     PLAN:  - HD today, to conitnTuesday, Thursday and Saturday.    -PRBC transfusion   -ultrafiltration as tolerated  -ARMANDO TIW with HD  - Continue home medicines.  - No dietary protein restrictions.  - Dose meds for ESRD    Thank you.    Mario Hunter MD  [facility]  [phone]

## 2021-01-05 NOTE — PROGRESS NOTES
Hospital Medicine Daily Progress Note    Date of Service  1/5/2021    Chief Complaint  75 y.o. male admitted 12/31/2020 with diabetic foot ulcer.    Hospital Course  Admitted for scheduled right BKA for diabetic foot ulcer.  He has known history of peripheral vascular disease, underwent right first toe amputation on May 2020, with nonhealing wound.  He has known history of end-stage renal disease on hemodialysis and Nephrology was consulted on the case.  He has required transfusion for his Anemia.    Interval Problem Update  BKA - pain controlled  Anemia - hgb 6.5  HTN - sbp 100-120  Diabetes - low 100s  Hyponatremia - 125  Resp failure - O2 2 lpm NC  Dysphagia -admits he has problems swallowing at times    Lengthy discussion with patient and son, updates given and plan of care discussed.    Consultants/Specialty  LPS  Nephrology    Code Status  Full Code    Disposition  Rehab    Review of Systems  Review of Systems   Constitutional: Positive for malaise/fatigue. Negative for chills, diaphoresis and fever.   HENT: Negative for congestion, hearing loss and sore throat.    Eyes: Negative for blurred vision.   Respiratory: Positive for cough. Negative for shortness of breath and wheezing.    Cardiovascular: Negative for palpitations and leg swelling.   Gastrointestinal: Negative for abdominal pain, diarrhea, heartburn, nausea and vomiting.   Genitourinary: Negative for dysuria, flank pain and hematuria.   Musculoskeletal: Negative for back pain, joint pain, myalgias and neck pain.   Skin: Negative for rash.   Neurological: Positive for weakness. Negative for dizziness, sensory change, speech change, focal weakness and headaches.   Psychiatric/Behavioral: The patient is not nervous/anxious.         Physical Exam  Temp:  [36.3 °C (97.3 °F)-36.8 °C (98.3 °F)] 36.7 °C (98.1 °F)  Pulse:  [84-91] 88  Resp:  [16-18] 18  BP: (105-148)/(46-73) 143/49  SpO2:  [99 %-100 %] 100 %    Physical Exam  Vitals signs and nursing note  reviewed.   HENT:      Head: Normocephalic and atraumatic.      Nose: No congestion.      Mouth/Throat:      Mouth: Mucous membranes are moist.   Eyes:      Extraocular Movements: Extraocular movements intact.      Conjunctiva/sclera: Conjunctivae normal.      Pupils: Pupils are equal, round, and reactive to light.   Neck:      Musculoskeletal: No muscular tenderness.   Cardiovascular:      Rate and Rhythm: Normal rate and regular rhythm.   Pulmonary:      Effort: Pulmonary effort is normal.      Breath sounds: Rales present.   Abdominal:      General: Bowel sounds are normal. There is no distension.      Palpations: Abdomen is soft.      Tenderness: There is no abdominal tenderness. There is no guarding or rebound.   Musculoskeletal:      Left lower leg: No edema.      Comments: Right BKA   Skin:     General: Skin is warm and dry.   Neurological:      General: No focal deficit present.      Mental Status: He is alert and oriented to person, place, and time.      Cranial Nerves: No cranial nerve deficit.         Fluids    Intake/Output Summary (Last 24 hours) at 1/5/2021 1624  Last data filed at 1/5/2021 1547  Gross per 24 hour   Intake 1540 ml   Output 2000 ml   Net -460 ml       Laboratory  Recent Labs     01/04/21  0651 01/04/21  1159 01/05/21  0051 01/05/21  0843 01/05/21  1544   WBC 15.5* 17.3* 15.7*  --   --    RBC 2.35* 2.08* 1.92*  --   --    HEMOGLOBIN 7.7* 7.0* 6.5* 7.7* 7.5*   HEMATOCRIT 23.5* 20.7* 19.3*  --   --    .0* 99.5* 100.5*  --   --    MCH 32.8 33.7* 33.9*  --   --    MCHC 32.8* 33.8 33.7  --   --    RDW 58.2* 56.7* 58.1*  --   --    PLATELETCT 340 301 334  --   --    MPV 8.6* 8.5* 8.7*  --   --      Recent Labs     01/03/21  0051 01/04/21  0651 01/05/21  0051   SODIUM 132* 128* 125*   POTASSIUM 4.1 4.7 4.7   CHLORIDE 95* 91* 87*   CO2 27 21 22   GLUCOSE 108* 103* 100*   BUN 16 32* 42*   CREATININE 3.40* 5.74* 6.49*   CALCIUM 9.0 9.2 9.6                   Imaging  DX-CHEST-LIMITED (1  VIEW)   Final Result      1.  Minimal interstitial opacity in the lateral aspect of the left lower lobe which represent minimal interstitial edema, pneumonia, and/or fibrotic change.      2.  The remainder of the lung parenchyma is clear.      3.  No pleural effusion.      IR-CVC TUNNEL W/O PORT REMOVAL   Final Result      Removal of the right internal jugular hemodialysis catheter.      US-HEMODIALYSIS GRAFT DUPLEX COMP UPPER EXTREMITY   Final Result           Assessment/Plan  * Diabetic foot ulcer (HCC)- (present on admission)  Assessment & Plan  Right below knee amputation. 12/31/2020  Pain control  PT and OT    Acute respiratory failure with hypoxia (HCC)- (present on admission)  Assessment & Plan  Check CXR and procalcitonin    Peripheral vascular disease (HCC)- (present on admission)  Assessment & Plan  Hold Eliquis    Dysphagia- (present on admission)  Assessment & Plan  SLP evaluation    Paroxysmal A-fib (HCC)- (present on admission)  Assessment & Plan  Metoprolol  Hold Eliquis    End stage renal disease on dialysis (HCC)- (present on admission)  Assessment & Plan  HD per Nephrology    Type 2 diabetes mellitus with kidney complication, with long-term current use of insulin (HCC)- (present on admission)  Assessment & Plan  Lantus, SSI    Anemia- (present on admission)  Assessment & Plan  Transfuse 1 u PRBC  Transfused 1 u PRBC  Serial hgb  Epogen    Essential hypertension- (present on admission)  Assessment & Plan  Metoprolol    CAD (coronary artery disease)- (present on admission)  Assessment & Plan  History of stents to proximal and mid LAD  Metoprolol, Atorvastatin    Hyperthyroidism- (present on admission)  Assessment & Plan  Methimazole  Check TSH    Hypovolemic hyponatremia- (present on admission)  Assessment & Plan  Follow bmp    Dyslipidemia- (present on admission)  Assessment & Plan  Atorvastatin       VTE prophylaxis: SCD

## 2021-01-05 NOTE — PROGRESS NOTES
" [facility]ATION SERVICE   POST SURGICAL PROGRESS NOTE      HPI:  75 y.o. male with past medical history that includes type 2 diabetes,  ESRD on hemodialysis, PAD, Afib, L great toe amputation with tenotomy, and HLD, , admitted 12/31/2020 for Pain in toe of right foot [M79.674], S/P BKA (below knee amputation) unilateral (HCC) [Z89.519].  An LPS consult has been requested for evaluation of right BKA.      SURGERY DATE: 12/31/2020 by Dr. Lynn  PROCEDURE: right below knee amputation      INTERVAL HISTORY:  1/6/2021: POD #6.  Patient drowsy but responds when name called.  Hemoglobin continues to drop despite transfusion.  Surgical dressing clean dry and intact.  Patient reports he has pneumonia from aspiration on his food.  Patient denies fevers, chills, nausea, vomiting.  Pain  controlled.  Wearing knee immobilizer.      PERTINENT LPS RESULTS:   Right lower extremity (below knee amputation):          Markedly devitalized 2nd toe with focal gangrenous necrosis of           the soft tissue and extensive devitalization of the underlying           bone with no evidence of osteomyelitis.          Moderate atherosclerotic disease with luminal narrowing and           calcification of the anterior and posterior tibialis arteries.          Surgical margins of resection (bone marrow, skin and soft           tissue) appear viable.          No malignancy identified.       SURGICAL SITE EXAM:      /48   Pulse 84   Temp 36.3 °C (97.3 °F) (Temporal)   Resp 16   Ht 1.676 m (5' 6\")   Wt 78.3 kg (172 lb 9.9 oz)   SpO2 100%   BMI 27.86 kg/m²       Sensation: Intact  Surgical leg warm      MATHEW incisional VAC in place, Mathew functioning  Knee immobilizer in place    Heel to left foot red blanching.  RN to place heel Mepilex      DIABETES MANAGEMENT:    Blood glucose:   Results from last 7 days   Lab Units 01/05/21  0604 01/05/21  0001 01/04/21 2051 01/04/21  1714 01/04/21  1132 01/04/21  0553 01/03/21  2149 " "01/03/21 1838   ACCU CHECK GLUCOSE 788 mg/dL 106* 111* 137* 95 92 95 105* 96     A1c:   Lab Results   Component Value Date/Time    HBA1C 7.8 (H) 11/14/2020 02:39 AM            INFECTION MANAGEMENT:    Results from last 7 days   Lab Units 01/05/21  0051 01/04/21  1159 01/04/21  0651 01/03/21  0051 01/01/21  1256   WBC 1501 K/uL 15.7* 17.3* 15.5* 9.8 8.4   PLATELET COUNT 1518 K/uL 334 301 340 312 352     Wound culture results:   Results     Procedure Component Value Units Date/Time    SARS-CoV-2, PCR (In-House) [[id number]] Collected: 01/04/21 1837    Order Status: Completed Updated: 01/05/21 0131     SARS-CoV-2 Source NP Swab     SARS-CoV-2 by PCR NotDetected     Comment: PATIENTS: Important information regarding your results and instructions can  be found at [email or web address]   \"After your  Covid-19 Test\"  [facility] providers: PLEASE REFER TO DE-ESCALATION AND RETESTING PROTOCOL  on [email or web address]  **The Roche SARS-CoV-2 PCR test has been made available for use under the  Emergency Use Authorization (EUA) only.         Narrative:      Collected By:[id number] EBER JANICE PANTOJA  Need a WET SWAB for admission to Rehab.  Is patient being admitted?->Yes  Does this patient meet criteria for Rush/Cepheid per [facility]  Inpatient Workflow? (See workflow link below)->No  Expected turn around time?->Routine (In-House PCR up to 24  hours)  Have you been in close contact with a person who is suspected  or known to be positive for COVID-19 within the last 30 days  (e.g. last seen that person < 30 days ago)->Unknown    COVID/SARS CoV-2 PCR [[id number]] Collected: 01/04/21 1837    Order Status: Completed Specimen: Respirate from Nasopharyngeal Updated: 01/04/21 1856     COVID Order Status Received     Comment: The order for SARS CoV-2 testing has been received by the  Laboratory. This result is neither positive nor negative.  Final results of testing will report in 24-48 hours, separately.         Narrative:      " Collected By:[id number] EBER PANTOJA  Need a WET SWAB for admission to Rehab.  Is patient being admitted?->Yes  Does this patient meet criteria for Rush/Cepheid per [facility]  Inpatient Workflow? (See workflow link below)->No  Expected turn around time?->Routine (In-House PCR up to 24  hours)  Have you been in close contact with a person who is suspected  or known to be positive for COVID-19 within the last 30 days  (e.g. last seen that person < 30 days ago)->Unknown               ASSESSMENT/PLAN:    POD#5 s/p Right BKA.  MATHEW device to be turned off on POD#7, island dressing to be placed.   sock to be initiated once mathew has been removed and incision has been assessed and there are no signs of wound dehiscence or ischemia  Okay to discharge to rehab from LPS/Ortho standpoint once medically cleared.      Wound care:   -Wound care orders placed for nursing   MATHEW device to be turned off on POD#7, island dressing to be placed followed by Ace wrap.  -RN to place her Mepilex to left foot.    Imaging/Labs:  -COVID-19: not detected this admission 1/4/2021     Vascular status:   - Palpable popliteal pulses +2     Surgery:   No further surgeries at this time.     Antibiotics:   On Unasyn.  Managed by hospitalist for pneumonia     Weight Bearing Status:   -Non Weight bearing to right BKA     Offloading:   -Immobilizer; in place  Consulted ability prosthetics.  Discussed with Brooke Pineda CPO     PT/OT:   -involved.  Worked with patient today        Diabetes Education:   - not necessary at this time.      - Implications of loss of protective sensation (LOPS) discussed with patient- including increased risk for amputation.  Advised to check feet at least daily, moisturize feet, and to always wear protective foot wear.   -avoid trimming own nails. See podiatrist or certified foot and nail RN  -keep blood sugars <150 for improved wound healing           DISCHARGE PLAN:    Disposition: Anticipate discharge to [facility]  rehab    Follow-up: Dr. Lynn, sutures to be removed approximately 3 weeks post-op      Discussed with: pt, RN, Dr. Lynn      Please note that this dictation was created using voice recognition software. I have  worked with technical experts from [facility] to optimize the interface.  I have made every reasonable attempt to correct obvious errors, but there may be errors of grammar and possibly content that I did not discover before finalizing the note.      Carla Farah, A.P.R.N.    If any questions or concerns, please contact LPS through voalte.

## 2021-01-06 NOTE — PROGRESS NOTES
"[facility] -  PROGRESS NOTE               Author: Mario Hunter M.D. Date & Time: 1/6/2021  8:18 AM     HPI:   A 75-year-old male with end-stage renal disease   on chronic hemodialysis Tuesdays, Thursdays and Saturdays.  The patient was   admitted on 12/31 for right below-the-knee amputation.  He had wound and   gangrene of the right foot.  He has had some peripheral vascular procedures   previously.  He did have a right ray amputation in 11/2020.  He has had a   nonhealing wound and he was admitted for right below-the-knee amputation that   was performed on 12/31.  We have been consulted to assist in his dialysis   needs.  He had dialysis on 12/31 prior to admission.  Today, he is feeling   dyspneic.  He has no cough or sputum production.    DAILY NEPHROLOGY SUMMARY:  1/1 - Consult seen  1/2 - Tolerated HD yesterday via TDC  1/3 - Tolerated HD via AVF.  No issues per HD nurse.  Complains of itchiness this morning.  Requesting benadryl  1/4: No acute events, pending rehab placement, tired this am, sedated.   1/5: Pending transfer to inpatient rehab when medically cleared, permacath removed   1/6: feels slightly congeted, tolerated HD, s/p PRBC transfusion with HGB to 7.5 but downt o 6.6 this AM    REVIEW OF SYSTEMS:    10 point ROS reviewed and is as per HPI/daily summary or otherwise negative    PMH/PSH/SH/FH: Reviewed and unchanged since admission note  CURRENT MEDICATIONS: Reviewed from admission to present day    PHYSICAL EXAM:  VS:  /54   Pulse 83   Temp 36.9 °C (98.4 °F) (Temporal)   Resp 16   Ht 1.676 m (5' 6\")   Wt 73.8 kg (162 lb 11.2 oz)   SpO2 98%   BMI 26.26 kg/m²   GENERAL: no acute distress  CV: trace edema   RESP: non-labored  GI: Soft  MSK: R. BKA   SKIN: No concerning rashes  NEURO: AOx3  PSYCH: Cooperative    Fluids:  In: 1734.8 [P.O.:450; Blood:784.8; Dialysis:500]  Out: 2150     LABS:  Recent Labs     01/04/21  0651 01/05/21  0051 01/06/21  0022   SODIUM " 128* 125* 132*   POTASSIUM 4.7 4.7 4.2   CHLORIDE 91* 87* 93*   CO2 21 22 24   GLUCOSE 103* 100* 104*   BUN 32* 42* 21   CREATININE 5.74* 6.49* 3.72*   CALCIUM 9.2 9.6 8.9       IMPRESSION:  #  End-stage renal disease, on chronic hemodialysis Tuesday, Thursday, and   Saturday.    #  Status post right below-the-knee amputation on 12/31/2020.  #  Hypertension, controlled.  #  Anemia: superimposed blood loss component in addition to chronic kidney disease.  #  Diabetes mellitus.  #  Chronic kidney disease - metabolic bone disorder.  Managed at the dialysis   unit.  #  Peripheral vascular disease.  #  Paroxysmal atrial fibrillation, on Eliquis.  #  Coronary artery disease, status post stents.  Asymptomatic.  #  History of hyperthyroidism.  #  Dyslipidemia, on statins.  #  Past history of heavy tobacco use.     PLAN:  -No HD today, to continue Tuesday, Thursday and Saturday.    -ultrafiltration as tolerated  -PRBC transfusion   -ARMANDO TIW with HD  - Continue home medicines.  - No dietary protein restrictions.  - Dose meds for ESRD    Thank you.    Mario Hunter MD  [facility]  [phone]

## 2021-01-06 NOTE — PROGRESS NOTES
Hemodialysis ordered by Dr. Hunter. Treatment started at 1217 and ended at 1547. Pt stable, vss, no c/o post tx. See flow sheets for details. Net UF 1.5 L. Reported to GORDON Salomon RN. Lt ua avf dressing clean, dry, intact.

## 2021-01-06 NOTE — PROGRESS NOTES
[facility] Medicine Daily Progress Note    Date of Service  1/6/2021    Chief Complaint  75 y.o. male admitted 12/31/2020 with diabetic foot ulcer.    Hospital Course  Admitted for scheduled right BKA for diabetic foot ulcer.  He has known history of peripheral vascular disease, underwent right first toe amputation on May 2020, with nonhealing wound.  He has known history of end-stage renal disease on hemodialysis and Nephrology was consulted on the case.  He has required transfusion for his Anemia.    Interval Problem Update  BKA - pain controlled  Anemia - hgb 6.6  HTN - sbp 130-140  Diabetes -   Hyponatremia - 132  Resp failure - O2 2 lpm NC  Pneumonia - CXR reviewed, procalcitonin 2.4    Lengthy discussion with patient and son, updates given and plan of care discussed.    Consultants/Specialty  LPS  Nephrology    Code Status  Full Code    Disposition  Rehab    Review of Systems  Review of Systems   Constitutional: Positive for malaise/fatigue. Negative for chills, diaphoresis and fever.   HENT: Negative for congestion, hearing loss and sore throat.    Eyes: Negative for blurred vision.   Respiratory: Positive for cough. Negative for shortness of breath and wheezing.    Cardiovascular: Negative for palpitations and leg swelling.   Gastrointestinal: Negative for abdominal pain, diarrhea, heartburn, nausea and vomiting.   Genitourinary: Negative for dysuria, flank pain and hematuria.   Musculoskeletal: Negative for back pain, joint pain, myalgias and neck pain.   Skin: Negative for rash.   Neurological: Positive for weakness. Negative for dizziness, sensory change, speech change, focal weakness and headaches.   Psychiatric/Behavioral: The patient is not nervous/anxious.         Physical Exam  Temp:  [36.6 °C (97.8 °F)-37.1 °C (98.7 °F)] 36.9 °C (98.4 °F)  Pulse:  [82-91] 83  Resp:  [16-18] 16  BP: (132-149)/(49-84) 134/54  SpO2:  [98 %] 98 %    Physical Exam  Vitals signs and nursing note reviewed.   HENT:       Head: Normocephalic and atraumatic.      Nose: No congestion.      Mouth/Throat:      Mouth: Mucous membranes are moist.   Eyes:      Extraocular Movements: Extraocular movements intact.      Conjunctiva/sclera: Conjunctivae normal.      Pupils: Pupils are equal, round, and reactive to light.   Neck:      Musculoskeletal: No muscular tenderness.   Cardiovascular:      Rate and Rhythm: Normal rate and regular rhythm.   Pulmonary:      Effort: Pulmonary effort is normal.      Breath sounds: Rales present.   Abdominal:      General: Bowel sounds are normal. There is no distension.      Palpations: Abdomen is soft.      Tenderness: There is no abdominal tenderness. There is no guarding or rebound.   Musculoskeletal:      Left lower leg: No edema.      Comments: Right BKA   Skin:     General: Skin is warm and dry.   Neurological:      General: No focal deficit present.      Mental Status: He is alert and oriented to person, place, and time.      Cranial Nerves: No cranial nerve deficit.         Fluids    Intake/Output Summary (Last 24 hours) at 1/6/2021 1010  Last data filed at 1/6/2021 0615  Gross per 24 hour   Intake 1734.75 ml   Output 2150 ml   Net -415.25 ml       Laboratory  Recent Labs     01/04/21  1159 01/05/21  0051 01/05/21  0843 01/05/21  1544 01/06/21  0023   WBC 17.3* 15.7*  --   --  7.0   RBC 2.08* 1.92*  --   --  2.00*   HEMOGLOBIN 7.0* 6.5* 7.7* 7.5* 6.6*  6.6*   HEMATOCRIT 20.7* 19.3*  --   --  20.3*   MCV 99.5* 100.5*  --   --  101.5*   MCH 33.7* 33.9*  --   --  32.5   MCHC 33.8 33.7  --   --  32.0*   RDW 56.7* 58.1*  --   --  60.0*   PLATELETCT 301 334  --   --  276   MPV 8.5* 8.7*  --   --  8.9*     Recent Labs     01/04/21  0651 01/05/21  0051 01/06/21  0022   SODIUM 128* 125* 132*   POTASSIUM 4.7 4.7 4.2   CHLORIDE 91* 87* 93*   CO2 21 22 24   GLUCOSE 103* 100* 104*   BUN 32* 42* 21   CREATININE 5.74* 6.49* 3.72*   CALCIUM 9.2 9.6 8.9                   Imaging  DX-CHEST-LIMITED (1 VIEW)   Final  Result      1.  Minimal interstitial opacity in the lateral aspect of the left lower lobe which represent minimal interstitial edema, pneumonia, and/or fibrotic change.      2.  The remainder of the lung parenchyma is clear.      3.  No pleural effusion.      IR-CVC TUNNEL W/O PORT REMOVAL   Final Result      Removal of the right internal jugular hemodialysis catheter.      US-HEMODIALYSIS GRAFT DUPLEX COMP UPPER EXTREMITY   Final Result           Assessment/Plan  * Diabetic foot ulcer (HCC)- (present on admission)  Assessment & Plan  Right below knee amputation. 12/31/2020  Pain control  PT and OT    Pneumonia  Assessment & Plan  Start IV Unasyn    Acute respiratory failure with hypoxia (HCC)- (present on admission)  Assessment & Plan  RT protocol    Peripheral vascular disease (HCC)- (present on admission)  Assessment & Plan  Hold Eliquis    Dysphagia- (present on admission)  Assessment & Plan  Change to Level 6, Liquid level 1  SLP evaluation    Paroxysmal A-fib (HCC)- (present on admission)  Assessment & Plan  Metoprolol  Hold Eliquis    End stage renal disease on dialysis (HCC)- (present on admission)  Assessment & Plan  HD per Nephrology    Type 2 diabetes mellitus with kidney complication, with long-term current use of insulin ([facility])- (present on admission)  Assessment & Plan  Stop Lantus  SSI    Anemia- (present on admission)  Assessment & Plan  Transfuse 1 u PRBC  Transfused 2 u PRBC  Serial hgb  Epogen    Essential hypertension- (present on admission)  Assessment & Plan  Metoprolol    CAD (coronary artery disease)- (present on admission)  Assessment & Plan  History of stents to proximal and mid LAD  Metoprolol, Atorvastatin    Hyperthyroidism- (present on admission)  Assessment & Plan  Methimazole  TSH wnl    Hyponatremia- (present on admission)  Assessment & Plan  Follow bmp    Dyslipidemia- (present on admission)  Assessment & Plan  Atorvastatin       VTE prophylaxis: SCD

## 2021-01-06 NOTE — THERAPY
"Speech Language Pathology   Clinical Swallow Evaluation     Patient Name: Luis Sepulveda  AGE:  75 y.o., SEX:  male  Medical Record #: [id number]  Today's Date: 1/6/2021     Precautions  Precautions: Fall Risk, Immobilizer Right Lower Extremity    Assessment  Patient is 75 y.o. male admitted 12/31 for a planned BKA. PMHx of DM Type 2, ESRD on HD, PAD, CAD s/p LAD stent, hyperthyroidism. CXR reports \"Minimal interstitial opacity in the lateral aspect of the left lower lobe which represent minimal interstitial edema, pneumonia, and/or fibrotic change. The remainder of the lung parenchyma is clear. No pleural effusion.\" By seen by SLP 2/2019 with recs for reg/thins w/ strategies and MBS if needed d/t C3-4 osteophytes.    Patient seen this date for clinical swallow evaluation. Patient currently on SB6/ST1 diet per MD d/t findings of pneumonia on CXR yesterday. Patient awake, but lethargic, and frequently falling asleep at times, with delayed and often absent responses to questions and commands; query lethargy vs. Narcotic vs. Petit seizures. Patient also noted to have spastic movement of RUE at times during self-feeding and required assistance. RN aware and reported she has spoken with MD regarding same. 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 Prominent C3-C4 anterior osteophyte which mildly contours the posterior aspect of the pharynx. 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 No obstruction is noted. No filling defect is noted in the esophagus. No stricture is noted. There are tertiary contractions. No hiatal hernia is present and there is no evidence of gastroesophageal reflux.\" Patient was tolerating regular diet per SLP note, but is unable to report his swallow function since then, giving mixed reviews if he was on a regular vs. Chopped textures at home. Patient unable to follow commands for oral " motor evaluation, but upon inspection, patient noted to have oral residue of minced/moist textures upon inspection. Patient frequently fell asleep with PO in oral cavity. Patient consumed PO trials of minced/moist, soft/bite-sized, pudding, thins via cup sip, and MTL via cup sip. Patient had delayed throat clearing on all textures except for pudding and MTL. He gave mixed report if this was habitual vs. D/t pharyngeal globus sensation. Laryngeal elevation palpated as weak.     At this time, patient appears at risk for aspiration and suspect cognition/mentation may not be at baseline. D/t intermittent s/sx of aspiration and lethargy, recommend patient downgrade to PU4/MT2 diet with 1:1 supervision and assistance if needed. Crush meds in puree. No straws. Patient may need MBS if not tolerating current diet, as indicated by SLP. SLP to follow.     Plan  Recommend Speech Therapy 3 times per week until therapy goals are met for the following treatments:  Dysphagia Training, Cognitive-Linguistic Training and Patient / Family / Caregiver Education.    Discharge Recommendations: Recommend post-acute placement for additional speech therapy services prior to discharge home     Objective     01/06/21 1435   Precautions   Precautions Fall Risk;Immobilizer Right Lower Extremity   Vitals   O2 (LPM) 2   O2 Delivery Device Nasal Cannula   Oral Motor Eval    Is Patient Able to Complete Oral Motor Eval No   Barriers To Oral Feeding   Barriers To Oral Feeding Generalized Weakness;Lethargic / Inadequate Alertness;Impaired Cognition / Attention   Laryngeal Function   Voice Quality Within Functional Limits   Volutional Cough Within Functional Limits   Excursion Upon Swallow Weak    Max Phonation Time (Seconds) 5   Oral Food Presentation   Single Swallow Mildly Thick (2) - (Nectar Thick)  Within Functional Limits   Serial Swallow Mildly Thick (2) - (Nectar Thick) Within Functional Limits   Single Swallow Thin (0) Moderate   Serial Swallow  Thin (0) Moderate   Pureed (4) Within Functional Limits   Minced & Moist (5) - (Dysphagia II) Moderate   Soft & Bite-Sized (6) - (Dysphagia III) Moderate   Self Feeding Needs Assistance   Dysphagia Strategies / Recommendations   Strategies / Interventions Recommended (Yes / No) Yes   Compensatory Strategies Direct Supervision During Meals;Head of Bed 90 Degrees During Eating / Drinking;Head of Bed 45 Degrees After Meals;No Straws;Single Sips / Bites;Multiple Swallows   Diet / Liquid Recommendation Puree (4);Mildly Thick (2) - (Nectar Thick)   Medication Administration  Crush all Medications in Puree   Therapy Interventions Dysphagia Therapy By Speech Language Pathologist   Short Term Goals   Short Term Goal # 1 Patient will consume PU4/MT2 diet with 1:1 feeding with no overt s/sx of aspiration.    Anticipated Discharge Needs   Discharge Recommendations Recommend post-acute placement for additional speech therapy services prior to discharge home

## 2021-01-06 NOTE — PROGRESS NOTES
Bedside report received. Assessment completed.  Pt is A&O x4. Pt on 2L humidified O2 via nc. Pt is fatigued.  Pt denies pain at this time.  Denies nausea.   - numbness, - tingling.  Skin has R BKA, RU Chest dressing, LUE fistula        LDA AV fistula LUE   Last BM 1/4/21 . +flatus,   +void.  Diabetic/Renal  diet. Tolerates well.   Pt up x1.  Abdomen taut and firm  Call light and belongings within reach. All needs met at this time. Fall Precautions and hourly rounding in place.

## 2021-01-06 NOTE — THERAPY
Occupational Therapy  Daily Treatment     Patient Name: Luis Sepulveda  Age:  75 y.o., Sex:  male  Medical Record #: [id number]  Today's Date: 1/6/2021     Precautions  Precautions: Fall Risk, Immobilizer Right Lower Extremity    Assessment    Pt seen for OT session. Pt falling asleep while eating and reporting difficulty swallowing, food removed and RN/SLP notified. Intermittent BUE tremors and several LUE twitch/jerk motions, causing pt to lose  on fork and FWW. Pt req multiple v/cs for sequencing and attending to task. Req additional assist this session with txf to chair. Continues to be limited by decreased functional mobility, activity tolerance, cognition, coordination, balance, adherence to precautions, and pain which are currently affecting pt's ability to complete ADLs/IADLs at baseline. Will continue to follow.     Plan    Continue current treatment plan.    DC Equipment Recommendations: Unable to determine at this time  Discharge Recommendations: Recommend post-acute placement for additional occupational therapy services prior to discharge home    Objective     01/06/21 0918   Vitals   O2 (LPM) 2   O2 Delivery Device Nasal Cannula   Pain 0 - 10 Group   Location Leg   Location Orientation Right   Therapist Pain Assessment Post Activity Pain Same as Prior to Activity;During Activity;Nurse Notified  (not quantified)   Cognition    Cognition / Consciousness X   Speech/ Communication Delayed Responses  (sometimes not aware therapist spoke, req repeat)   Level of Consciousness Lethargic  (falling asleep while eating and when speaking to therapist )   Safety Awareness Impaired   Attention Impaired  (falling asleep req v/cs)   Sequencing Impaired  (hand placement on FWW and STS)   Comments cooperative, but intermittently falling asleep while eating/speaking to therapist. Req repeated instructions and v/cs throughout session. Req repeated v/cs/commands intermittently and often trailing off while speaking.     Passive ROM Upper Body   Passive ROM Upper Body WDL   Active ROM Upper Body   Active ROM Upper Body  X   Comments Demo'd intermittent BUE intentional tremors during session as well as muscle jerks while holding fork, causing him to drop fork. Possibly related to fatigue and falling asleep   Strength Upper Body   Upper Body Strength  WDL   Other Treatments   Other Treatments Provided educated on weight shift when standing and hand placement/sequencing for STS   Balance   Sitting Balance (Static) Fair +   Sitting Balance (Dynamic) Fair +   Standing Balance (Static) Fair -   Standing Balance (Dynamic) Poor +   Weight Shift Sitting Fair   Weight Shift Standing Absent   Skilled Intervention Verbal Cuing;Tactile Cuing;Sequencing;Facilitation;Compensatory Strategies   Comments min A in stand and min-mod for txf; LOBs req min A to correct   Bed Mobility    Supine to Sit Supervised  (HOB elevated)   Sit to Supine   (left in chair)   Scooting Supervised   Skilled Intervention Verbal Cuing;Tactile Cuing;Compensatory Strategies   Comments Req extra time and repeated v/cs to get EOB today   Activities of Daily Living   Eating Minimal Assist  (falling asleep and dropped fork x2)   Grooming Supervision;Seated  (wiping face with cloth)   Lower Body Dressing Moderate Assist   Toileting Minimal Assist  (seated use of urinal)   Skilled Intervention Verbal Cuing;Tactile Cuing;Facilitation;Compensatory Strategies   Comments reported difficulty swallowing/eating; food removed and RN/SLP informed   Functional Mobility   Sit to Stand Minimal Assist   Bed, Chair, Wheelchair Transfer Moderate Assist   Transfer Method Stand Pivot   Mobility w/ FWW, EOB>chair   Skilled Intervention Verbal Cuing;Tactile Cuing;Sequencing;Facilitation;Compensatory Strategies   Activity Tolerance   Sitting in Chair 10+ min left in chair   Sitting Edge of Bed 12 min   Standing <1 min total   Comments fatigues quickly   Short Term Goals   Short Term Goal # 1 SBA  necessary functional transfers   Goal Outcome # 1 Progressing as expected   Short Term Goal # 2 stand at sink with supervision to complete grooming, light ADL tasks   Goal Outcome # 2 Goal not met   Short Term Goal # 3 tolerate 5 minutes of continuous standing ADL activity, prior to resting   Goal Outcome # 3 Goal not met   Short Term Goal # 4 distant supervision for toileting   Goal Outcome # 4 Goal not met   Anticipated Discharge Equipment and Recommendations   DC Equipment Recommendations Unable to determine at this time   Discharge Recommendations Recommend post-acute placement for additional occupational therapy services prior to discharge home

## 2021-01-06 NOTE — PROGRESS NOTES
Bedside report received.  Assessment complete.  A&O x 4. Patient calls appropriately.  Patient mobilizes with 1 assist and walker. Bed alarm on.   Patient has 2/10 pain. Patient denies any interventions at this time.  Denies N&V. Tolerating diabetic/renal diet.  Surgical site to right leg, dressing in place, clean and dry. Immobilizer in place with micheel drain.  + void, + flatus, + BM.  Patient denies SOB.  SCD's off.  Review plan with of care with patient. Call light and personal belongings with in reach. Hourly rounding in place. All needs met at this time.

## 2021-01-07 PROBLEM — G93.40 ENCEPHALOPATHY ACUTE: Status: ACTIVE | Noted: 2021-01-01

## 2021-01-07 PROBLEM — R54 FRAILTY SYNDROME IN GERIATRIC PATIENT: Status: ACTIVE | Noted: 2021-01-01

## 2021-01-07 PROBLEM — R33.9 URINARY RETENTION: Status: ACTIVE | Noted: 2021-01-01

## 2021-01-07 NOTE — THERAPY
Physical Therapy   Daily Treatment     Patient Name: Luis Sepulveda  Age:  75 y.o., Sex:  male  Medical Record #: [id number]  Today's Date: 1/6/2021     Precautions: Fall Risk, Immobilizer Right Lower Extremity    Assessment    Pt lethargic, needing constant cues to maintain arousal and sequencing cues for functional mobility; inattentive to cues for gait; able to take a few side steps along EOB; needs placement, will follow;     Plan    Continue current treatment plan.    DC Equipment Recommendations: Unable to determine at this time  Discharge Recommendations:  Recommend post-acute placement for additional physical therapy services prior to discharge home      Abridged Subjective/Objective       01/06/21 1105   Cognition    Cognition / Consciousness X   Speech/ Communication Delayed Responses   Level of Consciousness Lethargic   Safety Awareness Impaired   Sequencing Impaired   Initiation Impaired   Comments cooperative but frequently not alert during conversation; BP stable; needs continuous cues to attend to task and complete functional mobility leading to safety concern;    Passive ROM Lower Body   Passive ROM Lower Body X   Comments consistent with R BKA    Supine Lower Body Exercise   Supine Lower Body Exercises Yes   Quadriceps Isometrics 1 set of 10;Right   Balance   Sitting Balance (Static) Fair +   Sitting Balance (Dynamic) Fair   Standing Balance (Static) Poor +   Standing Balance (Dynamic) Poor   Weight Shift Sitting Fair   Weight Shift Standing Poor   Skilled Intervention Tactile Cuing;Verbal Cuing;Sequencing   Comments B UE support in sitting/standing; one LOB requiring mod A to maintain balance posteriorly; needing one step commands for sequencing for hopping;    Gait Analysis   Comments able to take 3 side steps along EOB;    Bed Mobility    Supine to Sit   (Nt, up with OT )   Sit to Supine Moderate Assist   Functional Mobility   Sit to Stand Moderate Assist   Bed, Chair, Wheelchair Transfer  Moderate Assist   Transfer Method Stand Step   Short Term Goals    Short Term Goal # 1 pt will perform bed mobility with flat bed, no rail in 6 visits.    Goal Outcome # 1 goal not met   Short Term Goal # 2 Pt will transfer with mod indep in 6 visits to improve functional indep.    Goal Outcome # 2 Goal not met   Short Term Goal # 3 Pt will ambulate x 50 feet using fWW with supervision in 6 viits.    Goal Outcome # 3 Goal not met   Short Term Goal # 4 Pt will show independent understanding of therex for BKA in 6 visits.    Goal Outcome # 4 Goal not met   Education Group   Additional Comments positioning exercises discussed, interupted by lab

## 2021-01-07 NOTE — ASSESSMENT & PLAN NOTE
Avoid benzodiazepines and anticholinergics  Frequent orientation  Avoid early morning labs  Avoid vital signs during sleep  Ambulate if possible  STOPPED MS Contin and Xanax, decreased Oxycodone dose  1/12- almost at his baseline

## 2021-01-07 NOTE — DISCHARGE PLANNING
TCTc from Stephen RN/CM  Anticipate pt will be medically cleared for transfer to [facility] on Friday 1/8/2020.  PT will also need a new covid test - a wet swab.   Bedside RN informed.     TCC to follow

## 2021-01-07 NOTE — DISCHARGE PLANNING
Care Transition Team Discharge Planning    Anticipates discharge disposition:  • [facility]    Action:  • This RN CM is following the case. Per Lenora Jimenez ,Admissions at [facility]. Pt's Insurance Auth is complete.  • Awaiting Medical Clearance so Pt can discharge to [facility].    Barriers to Discharge:  • Pending medical clearance    Plan:  Per Lenora, Pt needs a negative Covid result ( wet swab within 48 hours prior to acceptance. Informed JAN Bruno via voalte.     Addendum:  This RN CM informed Dr Su who states that he is not sure if Pt will be medically clear tomorrow. Informed Lenora .

## 2021-01-07 NOTE — PROGRESS NOTES
"[facility] -  PROGRESS NOTE               Author: Mario Hunter M.D. Date & Time: 1/7/2021  7:52 AM     HPI:   A 75-year-old male with end-stage renal disease   on chronic hemodialysis Tuesdays, Thursdays and Saturdays.  The patient was   admitted on 12/31 for right below-the-knee amputation.  He had wound and   gangrene of the right foot.  He has had some peripheral vascular procedures   previously.  He did have a right ray amputation in 11/2020.  He has had a   nonhealing wound and he was admitted for right below-the-knee amputation that   was performed on 12/31.  We have been consulted to assist in his dialysis   needs.  He had dialysis on 12/31 prior to admission.  Today, he is feeling   dyspneic.  He has no cough or sputum production.    DAILY NEPHROLOGY SUMMARY:  1/1 - Consult seen  1/2 - Tolerated HD yesterday via TDC  1/3 - Tolerated HD via AVF.  No issues per HD nurse.  Complains of itchiness this morning.  Requesting benadryl  1/4: No acute events, pending rehab placement, tired this am, sedated.   1/5: Pending transfer to inpatient rehab when medically cleared, permacath removed   1/6: feels slightly congeted, tolerated HD, s/p PRBC transfusion with HGB to 7.5 but downt o 6.6 this AM  1/7: No acute events, s/p PRBC transfusion    REVIEW OF SYSTEMS:    10 point ROS reviewed and is as per HPI/daily summary or otherwise negative    PMH/PSH/SH/FH: Reviewed and unchanged since admission note  CURRENT MEDICATIONS: Reviewed from admission to present day    PHYSICAL EXAM:  VS:  /52   Pulse 87   Temp 37.2 °C (98.9 °F) (Temporal)   Resp 15   Ht 1.676 m (5' 6\")   Wt 73.8 kg (162 lb 11.2 oz)   SpO2 97%   BMI 26.26 kg/m²   GENERAL: no acute distress  CV: trace edema   RESP: non-labored  GI: Soft  MSK: R. BKA   SKIN: No concerning rashes  NEURO: AOx3  PSYCH: Cooperative    Fluids:  In: 350 [P.O.:350]  Out: 150     LABS:  Recent Labs     01/05/21  0051 01/06/21  0022 " 01/07/21  0258   SODIUM 125* 132* 133*   POTASSIUM 4.7 4.2 4.2   CHLORIDE 87* 93* 94*   CO2 22 24 27   GLUCOSE 100* 104* 103*   BUN 42* 21 33*   CREATININE 6.49* 3.72* 5.85*   CALCIUM 9.6 8.9 9.7       IMPRESSION:  #  End-stage renal disease, on chronic hemodialysis Tuesday, Thursday, and   Saturday.    #  Status post right below-the-knee amputation on 12/31/2020.  #  Hypertension, controlled.  #  Anemia: superimposed blood loss in addition to chronic kidney disease.  #  Diabetes mellitus.  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  #  Peripheral vascular disease.  #  Paroxysmal atrial fibrillation, on Eliquis.  #  Coronary artery disease, status post stents.  Asymptomatic.  #  History of hyperthyroidism.  #  Dyslipidemia, on statins.  #  Past history of heavy tobacco use.     PLAN:  -HD today, to continue Tuesday, Thursday and Saturday.    -ultrafiltration as tolerated  -ARMANDO TIW with HD  -Continue home medicines.  -No dietary protein restrictions.  -Dose meds for ESRD    Thank you.    Mario Hunter MD  [facility]  [phone]

## 2021-01-07 NOTE — PROGRESS NOTES
[facility] Medicine Daily Progress Note    Date of Service  1/7/2021    Chief Complaint  75 y.o. male admitted 12/31/2020 with diabetic foot ulcer.    Hospital Course  Admitted for scheduled right BKA for diabetic foot ulcer.  He has known history of peripheral vascular disease, underwent right first toe amputation on May 2020, with nonhealing wound.  He has known history of end-stage renal disease on hemodialysis and Nephrology was consulted on the case.  He has required transfusion for his Anemia.  Postoperatively he developed pneumonia likely secondary to dysphagia.  He was started on IV Unasyn.  Swallow evaluation was done by SLP and his diet was downgraded to level 4, liquid level 2.    Interval Problem Update  BKA - pain controlled  Anemia - hgb 8.5  HTN - sbp 110-140  Diabetes - 100s  Hyponatremia - 133  Resp failure - O2 2 lpm NC  Encephalopathy - noted to be drowsy since yesterday afternoon    Consultants/Specialty  LPS  Nephrology    Code Status  Full Code    Disposition  Rehab    Review of Systems  Review of Systems   Constitutional: Positive for malaise/fatigue. Negative for chills, diaphoresis and fever.   HENT: Negative for congestion, hearing loss and sore throat.    Eyes: Negative for blurred vision.   Respiratory: Positive for cough. Negative for shortness of breath and wheezing.    Cardiovascular: Negative for palpitations and leg swelling.   Gastrointestinal: Negative for abdominal pain, diarrhea, heartburn, nausea and vomiting.   Genitourinary: Negative for dysuria, flank pain and hematuria.   Musculoskeletal: Negative for back pain, joint pain, myalgias and neck pain.   Skin: Negative for rash.   Neurological: Positive for weakness. Negative for dizziness, sensory change, speech change, focal weakness and headaches.   Psychiatric/Behavioral: The patient is not nervous/anxious.         Physical Exam  Temp:  [36.6 °C (97.9 °F)-37.2 °C (98.9 °F)] 36.9 °C (98.4 °F)  Pulse:  [81-87] 81  Resp:  [15-19]  18  BP: (116-147)/(42-62) 147/62  SpO2:  [93 %-100 %] 93 %    Physical Exam  Vitals signs and nursing note reviewed.   Constitutional:       Comments: Drowsy, easily arousable   HENT:      Head: Normocephalic and atraumatic.      Nose: No congestion.      Mouth/Throat:      Mouth: Mucous membranes are moist.   Eyes:      Extraocular Movements: Extraocular movements intact.      Conjunctiva/sclera: Conjunctivae normal.      Pupils: Pupils are equal, round, and reactive to light.   Neck:      Musculoskeletal: No muscular tenderness.   Cardiovascular:      Rate and Rhythm: Normal rate and regular rhythm.   Pulmonary:      Effort: Pulmonary effort is normal.      Breath sounds: Rales present.   Abdominal:      General: Bowel sounds are normal. There is no distension.      Palpations: Abdomen is soft.      Tenderness: There is no abdominal tenderness. There is no guarding or rebound.   Musculoskeletal:      Left lower leg: No edema.      Comments: Right BKA   Skin:     General: Skin is warm and dry.   Neurological:      General: No focal deficit present.      Cranial Nerves: No cranial nerve deficit.      Comments: oriented to person and place   Psychiatric:         Speech: Speech is delayed.         Fluids    Intake/Output Summary (Last 24 hours) at 1/7/2021 1152  Last data filed at 1/7/2021 0400  Gross per 24 hour   Intake 350 ml   Output 150 ml   Net 200 ml       Laboratory  Recent Labs     01/06/21  0023 01/06/21  1108 01/07/21  0258   WBC 7.0 11.1* 8.0   RBC 2.00* 2.93* 2.72*   HEMOGLOBIN 6.6*  6.6* 9.3* 8.5*   HEMATOCRIT 20.3* 28.4* 26.3*   .5* 96.9 96.7   MCH 32.5 31.7 31.3   MCHC 32.0* 32.7* 32.3*   RDW 60.0* 58.8* 58.5*   PLATELETCT 276 369 332   MPV 8.9* 8.9* 8.8*     Recent Labs     01/05/21  0051 01/06/21  0022 01/07/21  0258   SODIUM 125* 132* 133*   POTASSIUM 4.7 4.2 4.2   CHLORIDE 87* 93* 94*   CO2 22 24 27   GLUCOSE 100* 104* 103*   BUN 42* 21 33*   CREATININE 6.49* 3.72* 5.85*   CALCIUM 9.6 8.9  9.7                   Imaging  DX-CHEST-LIMITED (1 VIEW)   Final Result      1.  Minimal interstitial opacity in the lateral aspect of the left lower lobe which represent minimal interstitial edema, pneumonia, and/or fibrotic change.      2.  The remainder of the lung parenchyma is clear.      3.  No pleural effusion.      IR-CVC TUNNEL W/O PORT REMOVAL   Final Result      Removal of the right internal jugular hemodialysis catheter.      US-HEMODIALYSIS GRAFT DUPLEX COMP UPPER EXTREMITY   Final Result      CT-HEAD W/O    (Results Pending)        Assessment/Plan  * Diabetic foot ulcer (HCC)- (present on admission)  Assessment & Plan  Right below knee amputation. 12/31/2020  Pain control - scheduled Tylenol, Oxycodone prn  PT and OT    Encephalopathy acute  Assessment & Plan  Avoid benzodiazepines and anticholinergics  Frequent orientation  Avoid early morning labs  Avoid vital signs during sleep  Ambulate if possible  STOP MS Contin and Xanax, decrease oxycodone dose    Pneumonia  Assessment & Plan  IV Unasyn    Frailty syndrome in geriatric patient- (present on admission)  Assessment & Plan  5 hospitalizations in 1 year  Consult Palliative care    Acute respiratory failure with hypoxia (HCC)- (present on admission)  Assessment & Plan  RT protocol    Peripheral vascular disease (HCC)- (present on admission)  Assessment & Plan  Hold Eliquis    Dysphagia- (present on admission)  Assessment & Plan  Level 4, Liquid level 2  SLP to follow    Paroxysmal A-fib (HCC)- (present on admission)  Assessment & Plan  Metoprolol  Hold Eliquis    End stage renal disease on dialysis (HCC)- (present on admission)  Assessment & Plan  HD per Nephrology    Type 2 diabetes mellitus with kidney complication, with long-term current use of insulin (HCC)- (present on admission)  Assessment & Plan  Stop SSI    Anemia- (present on admission)  Assessment & Plan  Transfused 3 u PRBC  Follow cbc  Epogen    Essential hypertension- (present on  admission)  Assessment & Plan  Metoprolol    CAD (coronary artery disease)- (present on admission)  Assessment & Plan  History of stents to proximal and mid LAD  Metoprolol, Atorvastatin    Hyperthyroidism- (present on admission)  Assessment & Plan  Methimazole  TSH wnl    Hyponatremia- (present on admission)  Assessment & Plan  Follow bmp    Dyslipidemia- (present on admission)  Assessment & Plan  Atorvastatin       VTE prophylaxis: SCD

## 2021-01-07 NOTE — PROGRESS NOTES
Bedside report received. Assessment completed.  Pt is A&O x4. Pt on 2L humidified O2 via nc. Pt is fatigued and lethargic. Pt falls asleep while talking and brushing teeth.  Pt denies pain at this time.  Denies nausea.   - numbness, - tingling.  Skin has R BKA, RU Chest dressing, LUE fistula        LDA AV fistula LUE   Last BM 1/4/21 . +flatus,   +void.  Diabetic/Renal pureed and thick liquids diet. Tolerates well.   Pt up x1.  Abdomen taut and firm  Call light and belongings within reach. All needs met at this time. Fall Precautions and hourly rounding in place.

## 2021-01-08 NOTE — THERAPY
Physical Therapy   Daily Treatment     Patient Name: Luis Sepulveda  Age:  75 y.o., Sex:  male  Medical Record #: [id number]  Today's Date: 1/8/2021     Precautions: Fall Risk, Immobilizer Right Lower Extremity, Swallow Precautions ( See Comments)    Assessment    Pt continues to present w/ decreased functional mobility secondary to cognitive status. Pt stating he wanted to sit up upon entrance to room yet when cued to do so, pt just laid there. Pt needing manual initiation for most tasks today and was very delayed w/ responding to therapist questions. Pt needing close guarding while sitting EOB as he was extremely jerky w/ his UE's. He was able to do a couple of stands w/ good strength of the L LE. Pt laid back down as jerking increased and pt no long answering therapist. Pts vitals all WNL throughout session.    Plan    Continue current treatment plan.    DC Equipment Recommendations: Unable to determine at this time  Discharge Recommendations: Recommend post-acute placement for additional physical therapy services prior to discharge home     Objective       01/08/21 1116   Precautions   Precautions Fall Risk;Immobilizer Right Lower Extremity;Swallow Precautions ( See Comments)   Comments R BKA   Gait Analysis   Gait Level Of Assist Unable to Participate   Comments Unsafe to attempt. Pt getting increased jerking in B UE's w/ standing attempts.   Bed Mobility    Supine to Sit Minimal Assist   Sit to Supine Minimal Assist   Scooting Minimal Assist   Rolling   (sit pivot)   Comments Pt needing manual initiation to perform bed mobility. Continually stopping during and needing cues to continue.   Functional Mobility   Sit to Stand Moderate Assist   Bed, Chair, Wheelchair Transfer Unable to Participate   Mobility Pt not safe for chair transfer today. Delayed responses EOB w/ increased jerking.   Short Term Goals    Short Term Goal # 1 pt will perform bed mobility with flat bed, no rail in 6 visits.    Goal Outcome  # 1 goal not met   Short Term Goal # 2 Pt will transfer with mod indep in 6 visits to improve functional indep.    Goal Outcome # 2 Goal not met   Short Term Goal # 3 Pt will ambulate x 50 feet using fWW with supervision in 6 viits.    Goal Outcome # 3 Goal not met   Short Term Goal # 4 Pt will show independent understanding of therex for BKA in 6 visits.    Goal Outcome # 4 Goal not met

## 2021-01-08 NOTE — THERAPY
Speech Language Pathology  Daily Treatment     Patient Name: Luis Sepulveda  Age:  75 y.o., Sex:  male  Medical Record #: [id number]  Today's Date: 1/8/2021     Precautions  Precautions: Fall Risk, Immobilizer Right Lower Extremity, Swallow Precautions ( See Comments)  Comments: (P) Confused and lethargic; s/p R BKA     Assessment  Patient seen this date for dysphagia tx session, as RN and MD reported patient appears to still be having difficulty swallowing and RD reported intake has been poor with MD recommending possible Cortrak placement. Patient awake, but lethargic and requiring frequent verbal and tactile cues to sustain alertness and patient producing nonsensical, confused utterances during entire tx session. Patient consumed PO trials of MTL and purees. Patient presented with moderate s/sx of oropharyngeal dysphagia, evidenced by >30 seconds to trigger a swallow at times, minimal oral residue, and cough x1 on purees. Patient's cognition and mentation appear to be patient's biggest barrier to safe oral intake at this time.     Patient does not appear safe for PO intake at this time. Recommend patient downgrade to NPO with placement of Cortrak for supplemental nutrition. RN and MD aware and MD in agreement and to discuss SLP recs with son and downgrade diet if son in agreement with Cortrak placement and TF.     Plan  Continue current treatment plan.    Discharge Recommendations: Recommend post-acute placement for additional speech therapy services prior to discharge home     Objective     01/08/21 1427   Precautions   Precautions Fall Risk;Immobilizer Right Lower Extremity;Swallow Precautions ( See Comments)   Comments Confused and lethargic; s/p R BKA    Vitals   O2 Delivery Device None - Room Air   Dysphagia    Positioning / Behavior Modification Modulate Rate or Bite Size;Multiple Swallows   Diet / Liquid Recommendation NPO;Pre-Feeding Trials with SLP Only   Recommended Route of Medication  Administration   Medication Administration  Via Gastric Tube   Short Term Goals   Short Term Goal # 1 Patient will consume PU4/MT2 diet with 1:1 feeding with no overt s/sx of aspiration.    Goal Outcome # 1 Goal not met   Anticipated Discharge Needs   Discharge Recommendations Recommend post-acute placement for additional speech therapy services prior to discharge home

## 2021-01-08 NOTE — DISCHARGE PLANNING
Agency/Facility Name: [facility]  Outcome: [facility] was calling about bed availability today. No voicemail available [facility] will call back later.

## 2021-01-08 NOTE — PROGRESS NOTES
Pt laying in bed, call light within reach, bed lowered and locked, fall education reinforced. Bed alarm and waffle mattress in place. Pt lung sounds are diminished in the lower lobes, bowel sounds are hypoactive in all four quadrants, heart sounds are tachycardic. Pt is A&Ox3 not to situation and on 2L of oxygen via nasal cannula. Pt is x2 assist to mobilize with FWW. Pt is NWB of the right lower extremity. Pt has a right BKA with dressing replaced by this RN this shift of ABD pad, kerlex, and ace wrap CDI. The site has old dried blood but is otherwise well approximated with sutures, photo taken and uploaded into Epic. Pt has a palacios catheter in place draining a small amount of yellow urine CDI. Pt has a left arm AV fistula site with dry gauze dressing CDI. Pt has bilateral buttock and sacral redness and excoriation, barrier cream applied as needed. MATHEW drain removed by this RN this shift.

## 2021-01-08 NOTE — PROGRESS NOTES
3 1/2hr HD started @ 1437 and completed @ 1809,had 1 episode of bp drop otherwise tx well tolerated,asymptomatic.VSS post HD,LUAAVF + B/T,cannulation sites covered with DD,CDI,report given to LOREE Rudd RN.

## 2021-01-08 NOTE — DIETARY
"Nutrition services: Day 8 of admit.  Luis Sepulveda is a 75 y.o. male with admitting DX of pain of toe of right foot, diabetic foot ulcer great toe, s/p BKA unilateral     Potentially poor PO observed at weekly screen.     RD able to visit pt at bedside. Pt was sleepy and did not respond often to RD's questions. Pt states does not really have an appetite, though does not expand on this. RD asked if pt wanted to snacks, pt declined. RD asked if pt open to supplemental shakes, pt nodded in agreement before drifting back to sleep. Pt did not rouse to further questions afterward.     RD discussed concerns with MD, states pt will potentially be placed on TF if family agreeable.     Current problem list:  1. Encephalopathy acute  2. Diabetic foot ulcer, s/p right BKA  3. 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 Dysphagia  5. 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 CAD  7. Hyperthyroidism  8. CAD  9. Paroxysmal A-fib      Assessment:  Estimated Nutritional Needs based on:   Height: 167.6 cm (5' 5.98\")  Weight: 73.8 kg (162 lb 11.2 oz)  Weight to Use in Calculations: 73.8 kg (162 lb 11.2 oz)  Ideal Body Weight: 64.4 kg (142 lb)  Body mass index is 26.27 kg/m², BMI classification: Overweight per classification, though appears WNL for age.     Calculation/Equation: MSJ x 1.2 = 1688 kcal   Total Calories / day: 7506-7385 (Calories / k -26)  Total Grams Protein / day: 74 - 111 (Grams Protein / k.2-1.5)    Assessment:  Height: 167.6 cm (5' 6\")  Weight: 73.8 kg (162 lb 11.2 oz)  Diet/Intake: Level 4 Pureed/ mildly thick liquids-Level 2, no straws 1:1 supervision     Evaluation:   1. Per ADLS, pt with variable, though likely poor PO intake. Pt consuming on average 42% per last 8 meals documented within past 5 days. RD discussed with MD and SLP. MD, Dr. Su and SLP, Lissy, confirm swallowing change related to mentation, as experiencing swallow trigger delays. SLP states will change pt " to NPO/TF. MD states son will be updated this afternoon and will discuss potential cortrak placement.   2. Per weight hx, pt with potential 4.5 kg loss since admission, which is a severe 5.7% loss within ~8 days. Unsure of source of recent measured weight, which may contribute to potential margin of error.   3. Per brief visual observation, pt appears adequately nourished for advanced age.   4. Nephrology indicates encephalopathy, though no protein restrictions recommended.   5. Labs: Na 133, glucose 103, BUN 33, creatinine 5.85, A1c 7.8%; POC glucose: <180 mg/dL  6. Meds: lipitor, calcitriol, phos-lo, humulin/novolin, tapazole, prilosec, senokot  7. Last BM: 1/8/21-nasir/ brown   8. RD recommending TF suitable for diabetes to maintain glycemic control     Malnutrition Risk: Pt meets criteria for severe malnutrition in context of acute illness related to poor appetite as evidenced by likelihood of less than or equal to 50% of estimated nutrition needs fr past 5 days as well as potentially severe 5.7% loss within past 8 days.     Recommendations/Plan:  1. If TF within POC, recommend start Diabetisource AC @ 25 mL/hr to advance to goal rate of 65 mL/hr to provide 1872 kcal, 94 g protein, 1279 ml free water, 168 grams of CHO. Fluids per MD  2. RD to monitor POC and continue communication between interdisciplinary team     RD following

## 2021-01-08 NOTE — CONSULTS
"Reason for PC Consult: Advance Care Planning    Consulted by: Dr Su     Assessment:  General: 76 yo male admitted on 12/31/2020 for Pain in toe of right foot, Diabetic foot ulcer great toe, Right BKA. PMH: Arrhythmia, Arthritis, CAD, Chronic anticoagulation, CKD- stage V-dialysis dependant, CHF, Dental disorder, DM, HTN, Myocardial infarct, Osteoarthritis, PAD, Peripheral neuropathy, PNU, Sleep apnea.    Pt admitted for right BKA due to unhealing right great toe amputation on 11/16. Pt being followed outpatient and it was determined that due to his PAD the amputation site was not healing and pt would be best served with right NANCY.     Social: Pt lives with his ex-wife and son per notes. Pt able to verify that they \"help him\" but unable to clarify living situation.     Consults:   Nephrology  Physical Medicine & Rehab  Palliative Care    Dyspnea: No- pt denies, resp rate 19, 99% 2 L NC   Last BM: 01/08/21-    Pain: No- pt currently denies  Depression: Unable to determine-    Dementia: Unable to Determine;       Spiritual:  Is Hinduism or spirituality important for coping with this illness? Unable to determine-    Has a  or spiritual provider visit been requested? Unable to determine    Palliative Performance Scale: 40%    Advance Directive: None on file.   DPOA: NoWIN [phone], son   ANTONETTE: None on file.     Code Status: Full-      Outcome:  Met with the pt at the bedside, introduced self and the role of Palliative care. Pt sleepy, able to verify who he is, where, but unable to verbalize situation or verify living situation beyond the basic premise that his ex-wife and son \"help him\".   Pt able to express that he is comfortable at this time and is not in pain or experiencing any SOB. He continues to close his eyes and will not answer many questions.   He tells me that his son is  and has children, but that they do not live with him. Unsure on how reliable this information is. "     Spoke with Dr Su, pt is having increased confusion, currently lab work is pending. Once results are up, Dr Su wished to speak with pts son and then have Palliative RN follow up on conversation.     Will speak with pts family once MD has provided clinical update.     Plan: Will speak with pts family further on POC/GOC later today.     Thank you for allowing Palliative Care to participate in this patient's care. Please feel free to call [phone] with any questions or concerns.

## 2021-01-08 NOTE — PROGRESS NOTES
Entry time 1000 - Received report of patient at start of shift. Patient is AOx3, disoriented to event. Assessment complete, on room air. Redness and excoriation to bilateral buttocks - photo uploaded into epic, wound consult placed. Ace bandage and immobilizer in place to RLE, DANIELLE. Patient encouraged to use call light for assistance. Bed alarm on, safety precautions in place.     Entry time 1330 - Patient frequently attempting to void in urinal, unsuccessful. Bladder scan = 859. Dr. Su updated, received order to place palacios catheter. Palacios catheter placed without difficulty at 1315, patient tolerated well. 1000 ml yellow urine output drained upon palacios insertion.    Entry time 1840 - Patient transported to and from dialysis this afternoon. Back in room at this time. Patient confused with difficulty following directions. Attempted to administer 1800 medications. Patient refusing, stating he cannot swallow. Dr. Su updated, ok per this MD to hold evening medications.

## 2021-01-08 NOTE — PROGRESS NOTES
"[facility] -  PROGRESS NOTE               Author: Mario Hunter M.D. Date & Time: 1/8/2021  7:53 AM     HPI:   A 75-year-old male with end-stage renal disease   on chronic hemodialysis Tuesdays, Thursdays and Saturdays.  The patient was   admitted on 12/31 for right below-the-knee amputation.  He had wound and   gangrene of the right foot.  He has had some peripheral vascular procedures   previously.  He did have a right ray amputation in 11/2020.  He has had a   nonhealing wound and he was admitted for right below-the-knee amputation that   was performed on 12/31.  We have been consulted to assist in his dialysis   needs.  He had dialysis on 12/31 prior to admission.  Today, he is feeling   dyspneic.  He has no cough or sputum production.    DAILY NEPHROLOGY SUMMARY:  1/1 - Consult seen  1/2 - Tolerated HD yesterday via TDC  1/3 - Tolerated HD via AVF.  No issues per HD nurse.  Complains of itchiness this morning.  Requesting benadryl  1/4: No acute events, pending rehab placement, tired this am, sedated.   1/5: Pending transfer to inpatient rehab when medically cleared, permacath removed   1/6: feels slightly congeted, tolerated HD, s/p PRBC transfusion with HGB to 7.5 but downt o 6.6 this AM  1/7: No acute events, s/p PRBC transfusion  1/8: Tolerated HD, ongoing encephalopathy after stopping narcotics.     REVIEW OF SYSTEMS:    Unable to obatin due to altered mentation     PMH/PSH/SH/FH: Reviewed and unchanged since admission note  CURRENT MEDICATIONS: Reviewed from admission to present day    PHYSICAL EXAM:  VS:  /52   Pulse 99   Temp 36.9 °C (98.4 °F) (Temporal)   Resp 19   Ht 1.676 m (5' 6\")   Wt 73.8 kg (162 lb 11.2 oz)   SpO2 99%   BMI 26.26 kg/m²   GENERAL: no acute distress  CV: trace edema   RESP: non-labored  GI: Soft  MSK: R. BKA   SKIN: No concerning rashes  NEURO: AOx2  PSYCH: Cooperative    Fluids:  In: 750 [P.O.:150; Dialysis:500]  Out: 3550     LABS:  Recent " Labs     01/06/21  0022 01/07/21  0258   SODIUM 132* 133*   POTASSIUM 4.2 4.2   CHLORIDE 93* 94*   CO2 24 27   GLUCOSE 104* 103*   BUN 21 33*   CREATININE 3.72* 5.85*   CALCIUM 8.9 9.7       IMPRESSION:  #  End-stage renal disease, on chronic hemodialysis  TTS  #  Status post right below-the-knee amputation on 12/31/2020 for diabetic ulcer.  #  Hypertension, controlled.  #  Anemia: superimposed blood loss in addition to chronic kidney disease.  #  Diabetes mellitus.  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  #  Peripheral vascular disease.  #  Paroxysmal atrial fibrillation, on Eliquis.  #  Coronary artery disease, status post stents.  Asymptomatic.  #  History of hyperthyroidism.  #  Dyslipidemia, on statins.  #  Past history of heavy tobacco use.  # Pneumonia: on unasyn  # Encephalopathy: initially concern driven by narcotics but no improvement off narcotics/after HD yesterday     PLAN:  -No HD today, to continue Tuesday, Thursday and Saturday.    -ultrafiltration as tolerated  -ARMANDO TIW with HD  -Continue home medicines.  -No dietary protein restrictions.  -abx per primary  -Dose meds for ESRD  -limit narcotics/sedating meds    Thank you.    Mario Hunter MD  [facility]  [phone]

## 2021-01-08 NOTE — CARE PLAN
Problem: Nutritional:  Goal: Achieve adequate nutritional intake  Description: Pt to consume at least 50% of safest PO diet or consider nutrition support   Outcome: NOT MET  Note: See RD note

## 2021-01-08 NOTE — PROGRESS NOTES
[facility] Medicine Daily Progress Note    Date of Service  1/8/2021    Chief Complaint  75 y.o. male admitted 12/31/2020 with diabetic foot ulcer.    Hospital Course  Admitted for scheduled right BKA for diabetic foot ulcer.  He has known history of peripheral vascular disease, underwent right first toe amputation on May 2020, with nonhealing wound.  He has known history of end-stage renal disease on hemodialysis and Nephrology was consulted on the case.  He has required transfusion for his Anemia.  Postoperatively he developed pneumonia likely secondary to dysphagia.  He was started on IV Unasyn.  Swallow evaluation was done by SLP and his diet was downgraded to level 4, liquid level 2.  He also developed encephalopathy, secondary to multifactorial causes.    Interval Problem Update  BKA - pain controlled  Anemia - hgb 9.2  HTN - sbp 120-130  Diabetes - 100s  Resp failure - O2 2 lpm NC  Encephalopathy - appears more drowsy today, needs to be aroused during conversation  Pneumonia - CXR improved, procalcitonin better  Leukocytosis - 20,000, UA shows pyuria  Dysphagia - due to mentation, swallow evaluation done by SLP, recommending placement of cortrak and tube feeding for now    Lengthy discussion with patient's family which consists of his son and ex-wife, updates given, plan of care discussed.  Informed family of swallow reevaluation done by SLP recommending tube feeding for now for safety reasons and secondary to mentation issues. Family undecided currently and will discuss it further. We also discussed that patient was hospitalized 5 times last year and his quality of life seems to be worse after each hospitalization, and family does recognize this.  Also discussed patient's CODE STATUS, it appears that on his previous admission on April 2020, he had stated he wanted to be DNR/DNI.  Explained that with patient's multiple comorbidities, if patient required intubation and ventilator support he would have very poor  prognosis of coming off ventilator support and his quality of life will be very poor at that point.  His family states that he would not want it that way.  All their inquiries and questions were answered.  Palliative care was present and involved in this conversation.    Consultants/Specialty  LPS  Nephrology  Palliative care    Code Status  Full Code    Disposition  Rehab    Review of Systems  Review of Systems   Constitutional: Positive for malaise/fatigue. Negative for chills, diaphoresis and fever.   HENT: Negative for congestion, hearing loss and sore throat.    Eyes: Negative for blurred vision.   Respiratory: Positive for cough. Negative for shortness of breath and wheezing.    Cardiovascular: Negative for palpitations and leg swelling.   Gastrointestinal: Negative for abdominal pain, diarrhea, heartburn, nausea and vomiting.   Genitourinary: Negative for dysuria, flank pain and hematuria.   Musculoskeletal: Negative for back pain, joint pain, myalgias and neck pain.   Skin: Negative for rash.   Neurological: Positive for weakness. Negative for dizziness, sensory change, speech change, focal weakness and headaches.   Psychiatric/Behavioral: The patient is not nervous/anxious.         Physical Exam  Temp:  [36.6 °C (97.9 °F)-37.3 °C (99.2 °F)] 37.3 °C (99.2 °F)  Pulse:  [88-99] 95  Resp:  [15-19] 15  BP: (104-132)/(39-84) 104/39  SpO2:  [92 %-100 %] 96 %    Physical Exam  Vitals signs and nursing note reviewed.   Constitutional:       Comments: Drowsy, easily arousable   HENT:      Head: Normocephalic and atraumatic.      Nose: No congestion.      Mouth/Throat:      Mouth: Mucous membranes are moist.   Eyes:      Extraocular Movements: Extraocular movements intact.      Conjunctiva/sclera: Conjunctivae normal.      Pupils: Pupils are equal, round, and reactive to light.   Neck:      Musculoskeletal: No muscular tenderness.   Cardiovascular:      Rate and Rhythm: Normal rate and regular rhythm.   Pulmonary:       Effort: Pulmonary effort is normal.      Breath sounds: Rales present.   Abdominal:      General: Bowel sounds are normal. There is no distension.      Palpations: Abdomen is soft.      Tenderness: There is no abdominal tenderness. There is no guarding or rebound.   Musculoskeletal:      Left lower leg: No edema.      Comments: Right BKA   Skin:     General: Skin is warm and dry.   Neurological:      General: No focal deficit present.      Cranial Nerves: No cranial nerve deficit.      Comments: oriented to person and place   Psychiatric:         Speech: Speech is delayed.         Fluids    Intake/Output Summary (Last 24 hours) at 1/8/2021 1557  Last data filed at 1/8/2021 0750  Gross per 24 hour   Intake 750 ml   Output 2575 ml   Net -1825 ml       Laboratory  Recent Labs     01/06/21  1108 01/07/21  0258 01/08/21  1042   WBC 11.1* 8.0 20.2*   RBC 2.93* 2.72* 2.90*   HEMOGLOBIN 9.3* 8.5* 9.2*   HEMATOCRIT 28.4* 26.3* 29.1*   MCV 96.9 96.7 100.3*   MCH 31.7 31.3 31.7   MCHC 32.7* 32.3* 31.6*   RDW 58.8* 58.5* 58.8*   PLATELETCT 369 332 361   MPV 8.9* 8.8* 8.5*     Recent Labs     01/06/21  0022 01/07/21  0258 01/08/21  1042   SODIUM 132* 133* 136   POTASSIUM 4.2 4.2 4.6   CHLORIDE 93* 94* 95*   CO2 24 27 23   GLUCOSE 104* 103* 106*   BUN 21 33* 24*   CREATININE 3.72* 5.85* 4.15*   CALCIUM 8.9 9.7 9.0                   Imaging  DX-CHEST-LIMITED (1 VIEW)   Final Result      Improving left basilar opacity, likely sequela of pneumonia. No new consolidation or pleural effusions.      CT-HEAD W/O   Final Result         1.  No acute intracranial abnormality.      DX-CHEST-LIMITED (1 VIEW)   Final Result      1.  Minimal interstitial opacity in the lateral aspect of the left lower lobe which represent minimal interstitial edema, pneumonia, and/or fibrotic change.      2.  The remainder of the lung parenchyma is clear.      3.  No pleural effusion.      IR-CVC TUNNEL W/O PORT REMOVAL   Final Result      Removal of the right  internal jugular hemodialysis catheter.      US-HEMODIALYSIS GRAFT DUPLEX COMP UPPER EXTREMITY   Final Result           Assessment/Plan  * Diabetic foot ulcer (HCC)- (present on admission)  Assessment & Plan  Right below knee amputation. 12/31/2020  Pain control - scheduled Tylenol, Oxycodone prn  PT and OT    Encephalopathy acute  Assessment & Plan  Avoid benzodiazepines and anticholinergics  Frequent orientation  Avoid early morning labs  Avoid vital signs during sleep  Ambulate if possible  STOPPED MS Contin and Xanax, decreased Oxycodone dose    Pneumonia  Assessment & Plan  Change to IV Zosyn    Urinary retention- (present on admission)  Assessment & Plan  Tomas placed    Frailty syndrome in geriatric patient- (present on admission)  Assessment & Plan  5 hospitalizations in 1 year  Palliative care    Acute respiratory failure with hypoxia (HCC)- (present on admission)  Assessment & Plan  RT protocol    Peripheral vascular disease (HCC)- (present on admission)  Assessment & Plan  Hold Eliquis    Dysphagia- (present on admission)  Assessment & Plan  NPO for now  SLP to follow  Family decision on TF pending    Paroxysmal A-fib ([facility])- (present on admission)  Assessment & Plan  Metoprolol  Hold Eliquis    End stage renal disease on dialysis ([facility])- (present on admission)  Assessment & Plan  HD per Nephrology    Type 2 diabetes mellitus with kidney complication, with long-term current use of insulin ([facility])- (present on admission)  Assessment & Plan  Monitor bmp    Anemia- (present on admission)  Assessment & Plan  Transfused 3 u PRBC  Follow cbc  Epogen    Essential hypertension- (present on admission)  Assessment & Plan  Metoprolol    CAD (coronary artery disease)- (present on admission)  Assessment & Plan  History of stents to proximal and mid LAD  Metoprolol, Atorvastatin    Hyperthyroidism- (present on admission)  Assessment & Plan  Methimazole  TSH wnl    Hyponatremia- (present on admission)  Assessment &  Plan  Follow bmp    Dyslipidemia- (present on admission)  Assessment & Plan  Atorvastatin       VTE prophylaxis: SCD

## 2021-01-09 PROBLEM — R82.81 PYURIA: Status: ACTIVE | Noted: 2021-01-01

## 2021-01-09 NOTE — DIETARY
Nutrition Support Update: tube feeding to start    Received consult for tube feeding. RD did full nutrition assessment with recommendations for tube feed yesterday (1/8). Reviewed chart and previous RD assessment and recommendations remain appropriate. Will place orders.     Plan/Recommend:  1. Start Diabetisource AC @ 25 mL/hr to advance to goal rate of 65 mL/hr to provide 1872 kcal, 94 g protein, 1279 ml free water, 168 grams of CHO.  2. Fluids per MD  3. Monitor weight trend    RD following

## 2021-01-09 NOTE — PROGRESS NOTES
Entry time 1840 - Received call from patient's son Miguel. Miguel stated that he discussed initiation of tube feeds with family and that they have agreed that they would like patient started on tube feeds. Dr. Su updated.

## 2021-01-09 NOTE — PROGRESS NOTES
Received report of patient at start of shift. Patient AOx3, confused and fatigued throughout shift. No complaints of pain. Assessment complete, on 2L O2 via NC. Ace bandage and immobilizer in place to RLE, CDI. Oral medications held due to difficulty swallowing, Dr. Su aware. Tomas catheter in place draining yellow urine, patient oliguric. Linens changed for bowel incontinence. Family previously at bedside visiting. Call light within reach, bed alarm on, frequent rounding completed.

## 2021-01-09 NOTE — PROGRESS NOTES
[facility] Medicine Daily Progress Note    Date of Service  1/9/2021    Chief Complaint  75 y.o. male admitted 12/31/2020 with diabetic foot ulcer.    Hospital Course  Admitted for scheduled right BKA for diabetic foot ulcer.  He has known history of peripheral vascular disease, underwent right first toe amputation on May 2020, with nonhealing wound.  He has known history of end-stage renal disease on hemodialysis and Nephrology was consulted on the case.  He has required transfusion for his Anemia.  Postoperatively he developed pneumonia likely secondary to dysphagia.  He was started on IV Unasyn.  Swallow evaluation was done by SLP and his diet was downgraded to level 4, liquid level 2.  He also developed encephalopathy, secondary to multifactorial causes.    Interval Problem Update  BKA - pain controlled  Anemia - hgb 8.7  HTN - sbp 100-150  Diabetes - 100s  Resp failure - O2 2 lpm NC  Encephalopathy - more alert and oriented today  Pneumonia - CXR improved  Pyuria - culture pending  Dysphagia - discussed with family, will start TF    Lengthy discussion with patient and son.  I saw patient earlier in the day, I informed him that cortrak would be placed and tube feeding to be started per family decision last night, patient was not agreeable and stated that he wanted to speak to his son.  Called the son who came to bedside.  Patient states he does not remember agreeing to the tube feeding.  The son has convinced the patient to at least try tube feeding for now.  I also readdressed CODE STATUS again with patient and his son.  Patient wishes to be DNR/DNI, son states that he still needs to continue discussing this with the patient's ex-wife and his sisters.  I emphasized that family should follow the patient's wishes with regards to the CODE STATUS.    Consultants/Specialty  LPS  Nephrology  Palliative care    Code Status  Full Code    Disposition  Rehab    Review of Systems  Review of Systems   Constitutional:  Positive for malaise/fatigue. Negative for chills, diaphoresis and fever.   HENT: Negative for congestion, hearing loss and sore throat.    Eyes: Negative for blurred vision.   Respiratory: Positive for cough. Negative for shortness of breath and wheezing.    Cardiovascular: Negative for palpitations and leg swelling.   Gastrointestinal: Negative for abdominal pain, diarrhea, heartburn, nausea and vomiting.   Genitourinary: Negative for dysuria, flank pain and hematuria.   Musculoskeletal: Negative for back pain, joint pain, myalgias and neck pain.   Skin: Negative for rash.   Neurological: Positive for weakness. Negative for dizziness, sensory change, speech change, focal weakness and headaches.   Psychiatric/Behavioral: The patient is not nervous/anxious.         Physical Exam  Temp:  [36.2 °C (97.2 °F)-37.3 °C (99.2 °F)] 36.9 °C (98.4 °F)  Pulse:  [88-97] 90  Resp:  [15-19] 17  BP: (104-151)/(39-65) 122/47  SpO2:  [92 %-98 %] 97 %    Physical Exam  Vitals signs and nursing note reviewed.   HENT:      Head: Normocephalic and atraumatic.      Nose: No congestion.      Mouth/Throat:      Mouth: Mucous membranes are moist.   Eyes:      Extraocular Movements: Extraocular movements intact.      Conjunctiva/sclera: Conjunctivae normal.      Pupils: Pupils are equal, round, and reactive to light.   Neck:      Musculoskeletal: No muscular tenderness.   Cardiovascular:      Rate and Rhythm: Normal rate and regular rhythm.   Pulmonary:      Effort: Pulmonary effort is normal.      Breath sounds: Rales present.   Abdominal:      General: Bowel sounds are normal. There is no distension.      Palpations: Abdomen is soft.      Tenderness: There is no abdominal tenderness. There is no guarding or rebound.   Musculoskeletal:      Left lower leg: No edema.      Comments: Right BKA   Skin:     General: Skin is warm and dry.   Neurological:      General: No focal deficit present.      Mental Status: He is alert and oriented to  person, place, and time.      Cranial Nerves: No cranial nerve deficit.   Psychiatric:         Attention and Perception: Attention normal.         Speech: Speech normal.         Cognition and Memory: Memory is impaired.         Fluids    Intake/Output Summary (Last 24 hours) at 1/9/2021 1211  Last data filed at 1/9/2021 0745  Gross per 24 hour   Intake 120 ml   Output 125 ml   Net -5 ml       Laboratory  Recent Labs     01/07/21  0258 01/08/21  1042 01/09/21  0839   WBC 8.0 20.2* 12.3*   RBC 2.72* 2.90* 2.74*   HEMOGLOBIN 8.5* 9.2* 8.7*   HEMATOCRIT 26.3* 29.1* 30.4*   MCV 96.7 100.3* 110.9*   MCH 31.3 31.7 31.8   MCHC 32.3* 31.6* 28.6*   RDW 58.5* 58.8* 66.2*   PLATELETCT 332 361 339   MPV 8.8* 8.5* 8.8*     Recent Labs     01/07/21 0258 01/08/21  1042 01/09/21  0839   SODIUM 133* 136 140   POTASSIUM 4.2 4.6 4.7   CHLORIDE 94* 95* 96   CO2 27 23 25   GLUCOSE 103* 106* 120*   BUN 33* 24* 34*   CREATININE 5.85* 4.15* 6.44*   CALCIUM 9.7 9.0 9.2                   Imaging  DX-CHEST-LIMITED (1 VIEW)   Final Result      1.  Minimal left lower lobe interstitial edema or pneumonia.      2.  Clear right lung.      DX-CHEST-LIMITED (1 VIEW)   Final Result      Improving left basilar opacity, likely sequela of pneumonia. No new consolidation or pleural effusions.      CT-HEAD W/O   Final Result         1.  No acute intracranial abnormality.      DX-CHEST-LIMITED (1 VIEW)   Final Result      1.  Minimal interstitial opacity in the lateral aspect of the left lower lobe which represent minimal interstitial edema, pneumonia, and/or fibrotic change.      2.  The remainder of the lung parenchyma is clear.      3.  No pleural effusion.      IR-CVC TUNNEL W/O PORT REMOVAL   Final Result      Removal of the right internal jugular hemodialysis catheter.      US-HEMODIALYSIS GRAFT DUPLEX COMP UPPER EXTREMITY   Final Result           Assessment/Plan  * Diabetic foot ulcer (HCC)- (present on admission)  Assessment & Plan  Right below  knee amputation. 12/31/2020  Pain control - scheduled Tylenol, Oxycodone prn  PT and OT    Encephalopathy acute  Assessment & Plan  Avoid benzodiazepines and anticholinergics  Frequent orientation  Avoid early morning labs  Avoid vital signs during sleep  Ambulate if possible  STOPPED MS Contin and Xanax, decreased Oxycodone dose    Pneumonia  Assessment & Plan  IV Zosyn    Pyuria  Assessment & Plan  Follow culture    Urinary retention- (present on admission)  Assessment & Plan  Tomas placed    Frailty syndrome in geriatric patient- (present on admission)  Assessment & Plan  5 hospitalizations in 1 year  Palliative care    Acute respiratory failure with hypoxia (HCC)- (present on admission)  Assessment & Plan  RT protocol    Peripheral vascular disease (HCC)- (present on admission)  Assessment & Plan  Hold Eliquis    Dysphagia- (present on admission)  Assessment & Plan  Place Cortrak and start TF  SLP to follow    Paroxysmal A-fib ([facility])- (present on admission)  Assessment & Plan  Metoprolol  Hold Eliquis    End stage renal disease on dialysis ([facility])- (present on admission)  Assessment & Plan  HD per Nephrology    Type 2 diabetes mellitus with kidney complication, with long-term current use of insulin ([facility])- (present on admission)  Assessment & Plan  Monitor bmp    Anemia- (present on admission)  Assessment & Plan  Transfused 3 u PRBC  Follow cbc  Epogen    Essential hypertension- (present on admission)  Assessment & Plan  Metoprolol    CAD (coronary artery disease)- (present on admission)  Assessment & Plan  History of stents to proximal and mid LAD  Metoprolol, Atorvastatin    Hyperthyroidism- (present on admission)  Assessment & Plan  Methimazole  TSH wnl    Hyponatremia- (present on admission)  Assessment & Plan  Follow bmp    Dyslipidemia- (present on admission)  Assessment & Plan  Atorvastatin       VTE prophylaxis: SCD

## 2021-01-09 NOTE — DISCHARGE PLANNING
[facility] Transitional Care Coordination     Follow up for Rehab.  Per Dr. Su, not medically cleared for Rehab.  Would welcome OT update as clinically appropriate.

## 2021-01-09 NOTE — PROGRESS NOTES
Patient assessed and found to be AAOx3, confused at times. Patient instructed to use call bell if in need of assistance. Nurse reminded Patient to remain in bed unless assisted by nursing staff. Patient states mild discomfort related to excoriation on buttocks, pain decreased after repositioning. Patient resting comfortably during the night, call bell within reach, will continue to monitor.

## 2021-01-09 NOTE — PROGRESS NOTES
"[facility] -  PROGRESS NOTE               Author: Boston Tristan M.D. Date & Time: 1/9/2021  10:58 AM     HPI:   A 75-year-old male with end-stage renal disease   on chronic hemodialysis Tuesdays, Thursdays and Saturdays.  The patient was   admitted on 12/31 for right below-the-knee amputation.  He had wound and   gangrene of the right foot.  He has had some peripheral vascular procedures   previously.  He did have a right ray amputation in 11/2020.  He has had a   nonhealing wound and he was admitted for right below-the-knee amputation that   was performed on 12/31.  We have been consulted to assist in his dialysis   needs.  He had dialysis on 12/31 prior to admission.  Today, he is feeling   dyspneic.  He has no cough or sputum production.    DAILY NEPHROLOGY SUMMARY:  1/1 - Consult seen  1/2 - Tolerated HD yesterday via TDC  1/3 - Tolerated HD via AVF.  No issues per HD nurse.  Complains of itchiness this morning.  Requesting benadryl  1/4: No acute events, pending rehab placement, tired this am, sedated.   1/5: Pending transfer to inpatient rehab when medically cleared, permacath removed   1/6: feels slightly congeted, tolerated HD, s/p PRBC transfusion with HGB to 7.5 but downt o 6.6 this AM  1/7: No acute events, s/p PRBC transfusion  1/8: Tolerated HD, ongoing encephalopathy after stopping narcotics.   1/9: Mentally more clear, discussions yesterday to establish GOC, he mentioned his wishes abut considering comfort measures and has discussed these with his wife and son but hasn't made a firm decision yet.     REVIEW OF SYSTEMS:    Unable to obatin due to altered mentation     PMH/PSH/SH/FH: Reviewed and unchanged since admission note  CURRENT MEDICATIONS: Reviewed from admission to present day    PHYSICAL EXAM:  VS:  /55   Pulse 88   Temp 37.1 °C (98.7 °F) (Temporal)   Resp 16   Ht 1.676 m (5' 5.98\")   Wt 71.9 kg (158 lb 8.2 oz)   SpO2 98%   BMI 25.60 kg/m²   GENERAL: " no acute distress  CV: trace edema   RESP: non-labored  GI: Soft  MSK: R. BKA   SKIN: No concerning rashes  NEURO: AOx2  PSYCH: Cooperative    Fluids:  In: 120 [P.O.:120]  Out: 125     LABS:  Recent Labs     01/07/21  0258 01/08/21  1042 01/09/21  0839   SODIUM 133* 136 140   POTASSIUM 4.2 4.6 4.7   CHLORIDE 94* 95* 96   CO2 27 23 25   GLUCOSE 103* 106* 120*   BUN 33* 24* 34*   CREATININE 5.85* 4.15* 6.44*   CALCIUM 9.7 9.0 9.2       IMPRESSION:  #  End-stage renal disease, on chronic hemodialysis  TTS  #  Status post right below-the-knee amputation on 12/31/2020 for diabetic ulcer.  #  Hypertension, controlled.  #  Anemia: superimposed blood loss in addition to chronic kidney disease.  #  Diabetes mellitus.  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  #  Peripheral vascular disease.  #  Paroxysmal atrial fibrillation, on Eliquis.  #  Coronary artery disease, status post stents.  Asymptomatic.  #  History of hyperthyroidism.  #  Dyslipidemia, on statins.  #  Past history of heavy tobacco use.  # Pneumonia: on unasyn  # Encephalopathy: initially concern driven by narcotics but no improvement off narcotics/after HD yesterday     PLAN:  -HD today and then continue Tuesday, Thursday and Saturday.    -ultrafiltration as tolerated  -ARMANDO TIW with HD  -Continue home medicines.  -No dietary protein restrictions.  -abx per primary  -Dose meds for ESRD  -limit narcotics/sedating meds    Patient considering comfort measures and has discussed these with his wife and son but hasn't made a firm decision yet. He is expressing that he is getting sick and tired of repeated hospitalizations and is not sure if he would like to continue this for long. I encuoraged him to continue discussions with palliative care and his family and that we would be available to support him in his decisions that he makes along with his family.     Thank you.    Boston Tristan MD, MS, FASN, FACP, LUCRECIA   [facility]

## 2021-01-09 NOTE — PALLIATIVE CARE
"Palliative Care follow-up  Met with Dr Su, pts son Miguel, and pts ex-wife, [place], in the conference room. Dr Su provided clinical overview and update. Discussed pts SLP eval and recommendation for NPO with cortrac. Discussed likely infection adding to multiple factors that are impacting Adalid's mentation. Cortrac explained and possible need for restraints if pt attempts to remove.   Discussed code status, explained Full code, CPR and intubation versus DNR and DNI.    Discussed multiple admissions last year and discussed overall quality of life in relation to the future. Miguel stated that he thought his father would not CPR but stated that he wished to discuss further with his mother prior alone prior to making that decision.     Both [place] and Miguel stated that Adalid completed an AD at [facility] prior to \"his heart procedure\". Let them know I will call and see if they still have that on file and provide a copy.   Let Miguel know that he is his fathers decision maker due to his fathers confusion at this time even without an AD on file.   Miguel and [place] stated that at least they have discussed some end of life decision making in the past and have some idea of what he would want.  Spoke further about TFs as support and nutrition while attempting to treat current underlying infective process in order to determine if pt will clear mentally.     Spoke further about pts status in the future and possible upcoming needs. Provided support for Miguel and [place] to voice their concerns. All questions answered.   Walked Miguel and Indiana back to hospitals's room. Provided Rico with mouth swabs and mouth/lip moisturizer. Instructed [place] on use. Provided Miguel and [place] with Palliative team contact number.   Let them know that they can call and update the Palliative team or just call for hospitals's bedside RN to convey any decision made for cortrac and/or code status.   Let the know we are here for any further questions or needs.     Called and " spoke with CM JAN Garnica at [facility], they stated that they do not have an AD on file for the pt.     Updated: Bedside RN     Plan: Family hopefull for pts infection and mentation to improve in order to proceed with DC to [facility] as planned. Family would like to complete new AD if able prior to DC. Will continue to remain available and work with pt and family on new AD if pts mentation improves.     Thank you for allowing Palliative Care to support this patient and family. Contact [phone] for additional assistance, change in patient status, or with any questions/concerns.

## 2021-01-09 NOTE — CARE PLAN
Problem: Communication  Goal: The ability to communicate needs accurately and effectively will improve  Outcome: PROGRESSING AS EXPECTED  Note: Patient assessed for communication deficits and nurse reinforced need to use call bell if in need of assistance. Patient Verbalized understanding.     Problem: Safety  Goal: Will remain free from injury  Outcome: PROGRESSING AS EXPECTED  Note: Patient educated on need remain in bed unless assisted by staff. Pt. Verbalized understanding

## 2021-01-10 NOTE — PROGRESS NOTES
Patient confused upon initial assessment. Alert to self only. Bed alarm on and call light within reach. Patient is using light appropriately and has gone from A&Ox1 to A&Ox3 during shift. O2 turned down from 2L to 1L during shift. Patient complains of discomfort on his buttocks related to excoriation. Barrier cream applied. Patient had medium sized BM up to commode and is now resting comfortably in bed, call bell within reach. Will continue to monitor.

## 2021-01-10 NOTE — THERAPY
Speech Language Pathology  Daily Treatment     Patient Name: uLis Sepulveda  Age:  75 y.o., Sex:  male  Medical Record #: [id number]  Today's Date: 1/10/2021     Precautions  Precautions: (P) Fall Risk, Immobilizer Right Lower Extremity, Swallow Precautions ( See Comments)  Comments: (P) reg/thins    Assessment    Pt seen this date for swallow reassessment, pt currently NPO. Of note, mentation appears to have cleared, pt A&Ox4 and followed all directions. Pt endorsed difficulty with large pills and with taking many pills simultaneously. PO trials of ice, MTL, liquidized, puree, soft and bite sized, regular and thins assessed. Hyolaryngeal elevation palpated as complete, timely initiation of swallow appreciated and vocal quality remained clear throughout session. Pt demonstrated functional mastication of all textures, with trace oral residue appreciated with regular. Residue was cleared with liquid wash. No other s/sx of aspiration appreciated with any consistencies consumed.     Recommend initiation of regular/thin liquid diet with adherence to the following: upright at 90* for PO, straws okay, slow rate, alternate liquids and solids. Please hold PO with any difficulty, change in status or changes in mentation. Meds whole with liquid wash, one at a time, and please halve large pills. SLP following.     Plan    Continue current treatment plan.    Discharge Recommendations: (P) Anticipate that the patient will have no further speech therapy needs after discharge from the hospital    Subjective    Pt was awake, alert, followed directions and participate fully in session. Pt A&Ox4 and eager for therapy.     Objective       01/10/21 1030   Dysphagia    Dysphagia X   Positioning / Behavior Modification Self Monitoring;Alternate Solids and Liquids;Modulate Rate or Bite Size   Other Treatments PO trials ice, MTL,LQ3,PU4,SB6,reg,thins   Diet / Liquid Recommendation Thin (0);Regular (7)   Nutritional Liquid Intake Rating  "Scale Non thickened beverages   Nutritional Food Intake Rating Scale Total oral diet with no restrictions   Nursing Communication Swallow Precaution Sign Posted at Head of Bed   Skilled Intervention Compensatory Strategies;Verbal Cueing   Recommended Route of Medication Administration   Medication Administration  Whole with Liquid Wash  (one at a time, halve large pills)   Patient / Family Goals   Patient / Family Goal #1 \"I'm having some trouble\"    Goal #1 Outcome Progressing as expected   Short Term Goals   Short Term Goal # 1 Patient will consume PU4/MT2 diet with 1:1 feeding with no overt s/sx of aspiration.    Goal Outcome # 1 Goal met, new goal added   Short Term Goal # 1 B  NEW GOAL 1/10/21: Pt will consume a diet of regular/thins with no s/sx of aspiration and min cues.          "

## 2021-01-10 NOTE — PROGRESS NOTES
[facility] -  PROGRESS NOTE               Author: Boston Trisatn M.D. Date & Time: 1/10/2021  12:38 PM     HPI:   A 75-year-old male with end-stage renal disease   on chronic hemodialysis Tuesdays, Thursdays and Saturdays.  The patient was   admitted on 12/31 for right below-the-knee amputation.  He had wound and   gangrene of the right foot.  He has had some peripheral vascular procedures   previously.  He did have a right ray amputation in 11/2020.  He has had a   nonhealing wound and he was admitted for right below-the-knee amputation that   was performed on 12/31.  We have been consulted to assist in his dialysis   needs.  He had dialysis on 12/31 prior to admission.  Today, he is feeling   dyspneic.  He has no cough or sputum production.    DAILY NEPHROLOGY SUMMARY:  1/1 - Consult seen  1/2 - Tolerated HD yesterday via TDC  1/3 - Tolerated HD via AVF.  No issues per HD nurse.  Complains of itchiness this morning.  Requesting benadryl  1/4: No acute events, pending rehab placement, tired this am, sedated.   1/5: Pending transfer to inpatient rehab when medically cleared, permacath removed   1/6: feels slightly congeted, tolerated HD, s/p PRBC transfusion with HGB to 7.5 but downt o 6.6 this AM  1/7: No acute events, s/p PRBC transfusion  1/8: Tolerated HD, ongoing encephalopathy after stopping narcotics.   1/9: Mentally more clear, discussions yesterday to establish GOC, he mentioned his wishes abut considering comfort measures and has discussed these with his wife and son but hasn't made a firm decision yet.   1/10; mentally more clear today.  Remembered discussion he had with me yesterday..  More cheerful today and does not want to discuss comfort care, looking forward to go to rehab    REVIEW OF SYSTEMS:    Unable to obatin due to altered mentation     PMH/PSH/SH/FH: Reviewed and unchanged since admission note  CURRENT MEDICATIONS: Reviewed from admission to present day    PHYSICAL  "EXAM:  VS:  /49   Pulse 79   Temp 37.1 °C (98.7 °F) (Temporal)   Resp 16   Ht 1.676 m (5' 5.98\")   Wt 71.9 kg (158 lb 8.2 oz)   SpO2 96%   BMI 25.60 kg/m²   GENERAL: no acute distress  CV: trace edema   RESP: non-labored  GI: Soft  MSK: R. BKA   SKIN: No concerning rashes  NEURO: AOx2  PSYCH: Cooperative    Fluids:  In: 1400 [Dialysis:500]  Out: 1925     LABS:  Recent Labs     01/08/21  1042 01/09/21  0839 01/10/21  0817   SODIUM 136 140 140   POTASSIUM 4.6 4.7 4.2   CHLORIDE 95* 96 100   CO2 23 25 24   GLUCOSE 106* 120* 150*   BUN 24* 34* 25*   CREATININE 4.15* 6.44* 5.36*   CALCIUM 9.0 9.2 9.1       IMPRESSION:  #  End-stage renal disease, on chronic hemodialysis  TTS  #  Status post right below-the-knee amputation on 12/31/2020 for diabetic ulcer.  #  Hypertension, controlled.  #  Anemia: superimposed blood loss in addition to chronic kidney disease.  #  Diabetes mellitus.  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  #  Peripheral vascular disease.  #  Paroxysmal atrial fibrillation, on Eliquis.  #  Coronary artery disease, status post stents.  Asymptomatic.  #  History of hyperthyroidism.  #  Dyslipidemia, on statins.  #  Past history of heavy tobacco use.  # Pneumonia: on unasyn  # Encephalopathy: initially concern driven by narcotics but no improvement off narcotics/after HD yesterday     PLAN:  -HD yesterday, no need for hemodialysis today   -Continue hemodialysis on Tuesday, Thursday and Saturday.    -ultrafiltration as tolerated  -ARMANDO TIW with HD  -Continue home medicines.  -No dietary protein restrictions.  -abx per primary  -Dose meds for ESRD  -limit narcotics/sedating meds    Appears that he has changed his mind and is not interested in comfort care anymore and wants to go to rehab.  Speech eval done again today and cleared for resuming regular thin liquid diet    Thank you.    Boston Tristan MD, MS, FASN, FACP, LUCRECIA   [facility]         "

## 2021-01-10 NOTE — PROGRESS NOTES
Patient A&Ox4, VSS on RA.  Much more alert today.  Tolerating PO, no s/s aspiration.  Good appetite.  Denies pain.  Rash/redness to buttocks, barrier paste applied. On waffle mattress.  R stump dressing changed per order, immobilizer replaced.    Patient turns self independently in bed.  Plan to work with PT this afternoon.  Call light within reach, bed alarm set, hourly rounding in place.  Wife updated on POC.

## 2021-01-10 NOTE — PROGRESS NOTES
Monitor summary  SR [id number]  Frequent PVCs, run down to 49  .20/.12/.40  Per monitor room strip summary

## 2021-01-10 NOTE — PROGRESS NOTES
This RN received a phone call from the patient's son Miguel who wanted updates on the patient's status. Updates were given covering oxygen demands, patient mentation, diet status and plan of care. Miguel became agitated stating that he and his mom [place] have already agreed to having the feeding tube placed 2 days ago and raised concerns for the patient's dietary status. I explained to Miguel that there is a physician's order for no tube feed until patient is re-evaluated by SLP on 1/10. Miguel asked to be [facility] during rounds at [phone]

## 2021-01-10 NOTE — CARE PLAN
Problem: Knowledge Deficit  Goal: Knowledge of disease process/condition, treatment plan, diagnostic tests, and medications will improve  Outcome: PROGRESSING AS EXPECTED  Note: Patient states an understanding of care plan and has no further questions at this time.      Problem: Pain Management  Goal: Pain level will decrease to patient's comfort goal  Outcome: PROGRESSING AS EXPECTED  Note: Patient states they are comfortable with pain regimen at this time.

## 2021-01-10 NOTE — PROGRESS NOTES
3 1/2hr HD started @1419,blood returned  45 minutes early @ 1710 due to signs of extracorporeal circuit clotting.Patient did not want to stay any longer to continue tx,Dr Tristan notified.Net UF = 1400ml,LUAAVF + B/T,cannulation sites covered with DD,CDI.Soft BM x 1 during tx,cleaned,c/o of being uncomfortable from but excoriations,repositioned for comfort.Report given to Juliette Velasco RN.

## 2021-01-10 NOTE — PROGRESS NOTES
[facility] Medicine Daily Progress Note    Date of Service  1/10/2021    Chief Complaint  75 y.o. male admitted 12/31/2020 with diabetic foot ulcer.    Hospital Course  Admitted for scheduled right BKA for diabetic foot ulcer.  He has known history of peripheral vascular disease, underwent right first toe amputation on May 2020, with nonhealing wound.  He has known history of end-stage renal disease on hemodialysis and Nephrology was consulted on the case.  He has required transfusion for his Anemia.  Postoperatively he developed pneumonia likely secondary to dysphagia.  He was started on IV Unasyn.  Swallow evaluation was done by SLP and his diet was downgraded to level 4, liquid level 2.  He also developed encephalopathy, secondary to multifactorial causes.    Interval Problem Update  BKA - pain controlled  Anemia - hgb 8.5  HTN - sbp 110-130  Diabetes -   Resp failure - O2 2 lpm NC  Encephalopathy - more alert and oriented   Pneumonia - CXR improved  Pyuria - culture pending  Dysphagia - swallow reeval done by SLP    Lengthy discussion with patient and son, updates given, plan of care discussed.    Consultants/Specialty  LPS  Nephrology  Palliative care    Code Status  Full Code    Disposition  Rehab    Review of Systems  Review of Systems   Constitutional: Positive for malaise/fatigue. Negative for chills, diaphoresis and fever.   HENT: Negative for congestion, hearing loss and sore throat.    Eyes: Negative for blurred vision.   Respiratory: Positive for cough. Negative for shortness of breath and wheezing.    Cardiovascular: Negative for palpitations and leg swelling.   Gastrointestinal: Negative for abdominal pain, diarrhea, heartburn, nausea and vomiting.   Genitourinary: Negative for dysuria, flank pain and hematuria.   Musculoskeletal: Negative for back pain, joint pain, myalgias and neck pain.   Skin: Negative for rash.   Neurological: Positive for weakness. Negative for dizziness, sensory change,  speech change, focal weakness and headaches.   Psychiatric/Behavioral: The patient is not nervous/anxious.         Physical Exam  Temp:  [37.1 °C (98.7 °F)-37.9 °C (100.3 °F)] 37.1 °C (98.7 °F)  Pulse:  [] 79  Resp:  [16-19] 16  BP: (110-133)/(47-84) 122/49  SpO2:  [93 %-99 %] 96 %    Physical Exam  Vitals signs and nursing note reviewed.   HENT:      Head: Normocephalic and atraumatic.      Nose: No congestion.      Mouth/Throat:      Mouth: Mucous membranes are moist.   Eyes:      Extraocular Movements: Extraocular movements intact.      Conjunctiva/sclera: Conjunctivae normal.      Pupils: Pupils are equal, round, and reactive to light.   Neck:      Musculoskeletal: No muscular tenderness.   Cardiovascular:      Rate and Rhythm: Normal rate and regular rhythm.   Pulmonary:      Effort: Pulmonary effort is normal.      Breath sounds: Rales present.   Abdominal:      General: Bowel sounds are normal. There is no distension.      Palpations: Abdomen is soft.      Tenderness: There is no abdominal tenderness. There is no guarding or rebound.   Musculoskeletal:      Left lower leg: No edema.      Comments: Right BKA   Skin:     General: Skin is warm and dry.   Neurological:      General: No focal deficit present.      Mental Status: He is alert and oriented to person, place, and time.      Cranial Nerves: No cranial nerve deficit.   Psychiatric:         Attention and Perception: Attention normal.         Speech: Speech normal.         Cognition and Memory: Memory is impaired.         Fluids    Intake/Output Summary (Last 24 hours) at 1/10/2021 1228  Last data filed at 1/10/2021 0815  Gross per 24 hour   Intake 1400 ml   Output 1950 ml   Net -550 ml       Laboratory  Recent Labs     01/08/21  1042 01/09/21  0839 01/10/21  0817   WBC 20.2* 12.3* 16.6*   RBC 2.90* 2.74* 2.72*   HEMOGLOBIN 9.2* 8.7* 8.5*   HEMATOCRIT 29.1* 30.4* 27.5*   .3* 110.9* 101.1*   MCH 31.7 31.8 31.3   MCHC 31.6* 28.6* 30.9*   RDW  58.8* 66.2* 60.4*   PLATELETCT 361 339 354   MPV 8.5* 8.8* 8.8*     Recent Labs     01/08/21  1042 01/09/21  0839 01/10/21  0817   SODIUM 136 140 140   POTASSIUM 4.6 4.7 4.2   CHLORIDE 95* 96 100   CO2 23 25 24   GLUCOSE 106* 120* 150*   BUN 24* 34* 25*   CREATININE 4.15* 6.44* 5.36*   CALCIUM 9.0 9.2 9.1                   Imaging  DX-CHEST-LIMITED (1 VIEW)   Final Result      1.  Minimal left lower lobe interstitial edema or pneumonia.      2.  Clear right lung.      DX-CHEST-LIMITED (1 VIEW)   Final Result      Improving left basilar opacity, likely sequela of pneumonia. No new consolidation or pleural effusions.      CT-HEAD W/O   Final Result         1.  No acute intracranial abnormality.      DX-CHEST-LIMITED (1 VIEW)   Final Result      1.  Minimal interstitial opacity in the lateral aspect of the left lower lobe which represent minimal interstitial edema, pneumonia, and/or fibrotic change.      2.  The remainder of the lung parenchyma is clear.      3.  No pleural effusion.      IR-CVC TUNNEL W/O PORT REMOVAL   Final Result      Removal of the right internal jugular hemodialysis catheter.      US-HEMODIALYSIS GRAFT DUPLEX COMP UPPER EXTREMITY   Final Result           Assessment/Plan  * Diabetic foot ulcer (HCC)- (present on admission)  Assessment & Plan  Right below knee amputation. 12/31/2020  Pain control - scheduled Tylenol, Oxycodone prn  PT and OT    Encephalopathy acute  Assessment & Plan  Avoid benzodiazepines and anticholinergics  Frequent orientation  Avoid early morning labs  Avoid vital signs during sleep  Ambulate if possible  STOPPED MS Contin and Xanax, decreased Oxycodone dose    Pneumonia  Assessment & Plan  IV Zosyn  Follow culture    Pyuria  Assessment & Plan  Follow culture    Urinary retention- (present on admission)  Assessment & Plan  Tomas placed    Frailty syndrome in geriatric patient- (present on admission)  Assessment & Plan  5 hospitalizations in 1 year  Palliative care    Acute  respiratory failure with hypoxia (HCC)- (present on admission)  Assessment & Plan  RT protocol    Peripheral vascular disease (HCC)- (present on admission)  Assessment & Plan  Hold Eliquis    Dysphagia- (present on admission)  Assessment & Plan  Renal Diabetoc diet, thin liquids  SLP to follow    Paroxysmal A-fib (HCC)- (present on admission)  Assessment & Plan  Metoprolol  Hold Eliquis    End stage renal disease on dialysis (HCC)- (present on admission)  Assessment & Plan  HD per Nephrology    Type 2 diabetes mellitus with kidney complication, with long-term current use of insulin (HCC)- (present on admission)  Assessment & Plan  Monitor bmp    Anemia- (present on admission)  Assessment & Plan  Transfused 3 u PRBC  Follow cbc  Epogen    Essential hypertension- (present on admission)  Assessment & Plan  Metoprolol    CAD (coronary artery disease)- (present on admission)  Assessment & Plan  History of stents to proximal and mid LAD  Metoprolol, Atorvastatin    Hyperthyroidism- (present on admission)  Assessment & Plan  Methimazole  TSH wnl    Hyponatremia- (present on admission)  Assessment & Plan  Follow bmp    Dyslipidemia- (present on admission)  Assessment & Plan  Atorvastatin       VTE prophylaxis: SCD

## 2021-01-11 PROBLEM — D72.829 LEUKOCYTOSIS: Status: ACTIVE | Noted: 2021-01-01

## 2021-01-11 NOTE — THERAPY
"Physical Therapy   Daily Treatment     Patient Name: Luis Sepulveda  Age:  75 y.o., Sex:  male  Medical Record #: [id number]  Today's Date: 1/11/2021     Precautions: Fall Risk, Immobilizer Right Lower Extremity, Swallow Precautions ( See Comments)    Assessment    Pt presenting more alert w/ therapy. Pt was able to demonstrate improved activity tolerance and cognition for cue following. Pt was able to utilize the FWW better today for standing activities. He was able to progress to gait w/ good foot clearance w/ hops. Pt demonstrating good SL standing balance and UE strength. Pt also recalling education on importance of ROM of R LE despite recent confusion.    Plan    Continue current treatment plan.    DC Equipment Recommendations: Unable to determine at this time  Discharge Recommendations: Recommend post-acute placement for additional physical therapy services prior to discharge home      Subjective    \"I need to change position because this bed is killing my tailbone!\"     Objective       01/11/21 1101   Precautions   Precautions Fall Risk;Immobilizer Right Lower Extremity;Swallow Precautions ( See Comments)   Gait Analysis   Gait Level Of Assist Minimal Assist   Assistive Device Front Wheel Walker   Distance (Feet) 3   # of Times Distance was Traveled 4   Deviation Step To;Bradykinetic   Comments Good L LE balance w/ good UE strength. Good foot clearance for hops.   Bed Mobility    Supine to Sit Minimal Assist   Sit to Supine   (NT up in chair)   Scooting Supervised   Rolling   (sit pivot)   Comments Minimal assist w/ trunk. Pt w/ some difficulty coordinating pivot w/ the immobilizer on.   Functional Mobility   Sit to Stand Minimal Assist   Bed, Chair, Wheelchair Transfer Minimal Assist   Transfer Method Stand Pivot   Mobility With FWW. Good balance, most assist needed for FWW management.   Short Term Goals    Short Term Goal # 1 pt will perform bed mobility with flat bed, no rail in 6 visits.    Goal " Outcome # 1 goal not met   Short Term Goal # 2 Pt will transfer with mod indep in 6 visits to improve functional indep.    Goal Outcome # 2 Goal not met   Short Term Goal # 3 Pt will ambulate x 50 feet using fWW with supervision in 6 viits.    Goal Outcome # 3 Goal not met   Short Term Goal # 4 Pt will show independent understanding of therex for BKA in 6 visits.    Goal Outcome # 4 Progressing as expected

## 2021-01-11 NOTE — DISCHARGE PLANNING
Would appreciate updated PT/OT evals once appropriate.  Msg placed to Dr. Su-seeking medical clearance. Will need a negative PCR COVID (wet swab) within 48 hours of an admission.  TCC remains monitoring.

## 2021-01-11 NOTE — PROGRESS NOTES
" LIMB PRESERVATION SERVICE   POST SURGICAL PROGRESS NOTE      HPI:  75 y.o. male with past medical history that includes type 2 diabetes,  ESRD on hemodialysis, PAD, Afib, L great toe amputation with tenotomy, and HLD, , admitted 12/31/2020 for Pain in toe of right foot [M79.674], S/P BKA (below knee amputation) unilateral (HCC) [Z89.519].  An LPS consult has been requested for evaluation of right BKA.      SURGERY DATE: 12/31/2020 by Dr. Lynn  PROCEDURE: right below knee amputation      INTERVAL HISTORY:  1/6/2021: POD #6.  Patient drowsy but responds when name called.  Hemoglobin continues to drop despite transfusion.  Surgical dressing clean dry and intact.  Patient reports he has pneumonia from aspiration on his food.  Patient denies fevers, chills, nausea, vomiting.  Pain  controlled.  Wearing knee immobilizer.  1/11/2021: POD #11.  Patient alert and oriented.  Worked with OT.  Per OT patient transferred well up to chair.  Knee immobilizer in place.  Heel Mepilex to left heel.  Pain controlled      PERTINENT LPS RESULTS:   Right lower extremity (below knee amputation):          Markedly devitalized 2nd toe with focal gangrenous necrosis of           the soft tissue and extensive devitalization of the underlying           bone with no evidence of osteomyelitis.          Moderate atherosclerotic disease with luminal narrowing and           calcification of the anterior and posterior tibialis arteries.          Surgical margins of resection (bone marrow, skin and soft           tissue) appear viable.          No malignancy identified.       SURGICAL SITE EXAM:      /57   Pulse 78   Temp 36.8 °C (98.3 °F) (Temporal)   Resp 16   Ht 1.676 m (5' 5.98\")   Wt 71.9 kg (158 lb 8.2 oz)   SpO2 95%   BMI 25.60 kg/m²       Sensation: Intact  Surgical leg warm  Knee immobilizer in place, no Ace wrap in place    Right BKA:  Incision well approximated with sutures.  Moderate to severe edema to lower leg  No " erythema  No drainage    Photo from 1/7/2021            Left posterior heel:  Severely tender to light touch  Entire area red, but blanching  White mild bulla noted to calcaneal spur.  Area is blanching.      Discussed wound care with JAN Segovia.  RN to apply heel float boot.  Heel Mepilex to be placed this evening to allow bulla to dry out.  Apply ABD pad to right BKA with Hypafix tape ability prosthetics to bring   sock.         DIABETES MANAGEMENT:    Blood glucose:   Results from last 7 days   Lab Units 01/11/21  1209 01/11/21  0735 01/10/21  1945 01/10/21  1751 01/10/21  0432 01/09/21  2354 01/09/21  1801 01/09/21  1218   ACCU CHECK GLUCOSE 788 mg/dL 167* 108* 141* 127* 121* 126* 90 115*     A1c:   Lab Results   Component Value Date/Time    HBA1C 7.8 (H) 11/14/2020 02:39 AM            INFECTION MANAGEMENT:    Results from last 7 days   Lab Units 01/11/21  1023 01/10/21  0817 01/09/21  0839 01/08/21  1042 01/07/21  0258 01/06/21  1108   WBC 1501 K/uL 18.2* 16.6* 12.3* 20.2* 8.0 11.1*   PLATELET COUNT 1518 K/uL 391 354 339 361 332 369     Wound culture results:   Results     Procedure Component Value Units Date/Time    URINE CULTURE(NEW) [[id number]] Collected: 01/11/21 1230    Order Status: Completed Specimen: Urine, Tomas Cath Updated: 01/11/21 1233    Narrative:      Droplet, Special Contact, and Eye [id number] CHETNA PANTOJA  Indication for culture:->New onset fever with no other  identified cause    COVID/SARS CoV-2 PCR [[id number]] Collected: 01/11/21 1025    Order Status: Completed Updated: 01/11/21 1059     COVID Order Status Received     Comment: The order for SARS CoV-2 testing has been received by the  Laboratory. This result is neither positive nor negative.  Final results of testing will report in 24-48 hours, separately.         Narrative:      Droplet, Special Contact, and Eye [id number] CHETNA PANTOJA  Have you been in close contact with a person who is suspected  or known to be  "positive for COVID-19 within the last 30 days  (e.g. last seen that person < 30 days ago)->No    CoV-2 and Flu A/B by PCR (Roche/TF): Collect NP swab in [facility] [[id number]] Collected: 01/11/21 1025    Order Status: Completed Specimen: Respirate from Nasopharyngeal Updated: 01/11/21 1059     SARS-CoV-2 Source NP Swab    Narrative:      Droplet, Special Contact, and Eye [id number] CHETNA  TOMMIE L  Have you been in close contact with a person who is suspected  or known to be positive for COVID-19 within the last 30 days  (e.g. last seen that person < 30 days ago)->No    BLOOD CULTURE [[id number]] Collected: 01/09/21 0839    Order Status: Completed Specimen: Blood from Peripheral Updated: 01/10/21 0802     Significant Indicator NEG     Source BLD     Site PERIPHERAL     Culture Result No Growth  Note: Blood cultures are incubated for 5 days and  are monitored continuously.Positive blood cultures  are called to the RN and reported as soon as  they are identified.      Narrative:      Per Hospital Policy: Only change Specimen Src: to \"Line\" if  specified by physician order.  Right Hand    BLOOD CULTURE [[id number]] Collected: 01/09/21 0955    Order Status: Completed Specimen: Blood from Peripheral Updated: 01/10/21 0802     Significant Indicator NEG     Source BLD     Site PERIPHERAL     Culture Result No Growth  Note: Blood cultures are incubated for 5 days and  are monitored continuously.Positive blood cultures  are called to the RN and reported as soon as  they are identified.      Narrative:      Per Hospital Policy: Only change Specimen Src: to \"Line\" if  specified by physician order.  Right Hand    URINALYSIS [[id number]]  (Abnormal) Collected: 01/08/21 1320    Order Status: Completed Specimen: Urine, Tomas Cath Updated: 01/08/21 1415     Color Yellow     Character Cloudy     Specific Gravity 1.014     Ph 7.5     Glucose 250 mg/dL      Ketones 40 mg/dL      Protein 300 mg/dL      Bilirubin Negative     Urobilinogen, " "Urine 0.2     Nitrite Negative     Leukocyte Esterase Moderate     Occult Blood Moderate     Micro Urine Req Microscopic    Narrative:      Collected By:[id number] PARIS RUSSTrey    COVID/SARS CoV-2 PCR [[id number]]     Order Status: Canceled Specimen: Respirate from Nasopharyngeal     SARS-CoV-2, PCR (In-House) [[id number]] Collected: 01/04/21 1837    Order Status: Completed Updated: 01/05/21 0131     SARS-CoV-2 Source NP Swab     SARS-CoV-2 by PCR NotDetected     Comment: PATIENTS: Important information regarding your results and instructions can  be found at [email or web address]   \"After your  Covid-19 Test\"  [facility] providers: PLEASE REFER TO DE-ESCALATION AND RETESTING PROTOCOL  on [email or web address]  **The Roche SARS-CoV-2 PCR test has been made available for use under the  Emergency Use Authorization (EUA) only.         Narrative:      Collected By:[id number] EBER PANTOJA  Need a WET SWAB for admission to Rehab.  Is patient being admitted?->Yes  Does this patient meet criteria for Rush/Cepheid per [facility]  Inpatient Workflow? (See workflow link below)->No  Expected turn around time?->Routine (In-House PCR up to 24  hours)  Have you been in close contact with a person who is suspected  or known to be positive for COVID-19 within the last 30 days  (e.g. last seen that person < 30 days ago)->Unknown    COVID/SARS CoV-2 PCR [[id number]] Collected: 01/04/21 1837    Order Status: Completed Specimen: Respirate from Nasopharyngeal Updated: 01/04/21 1856     COVID Order Status Received     Comment: The order for SARS CoV-2 testing has been received by the  Laboratory. This result is neither positive nor negative.  Final results of testing will report in 24-48 hours, separately.         Narrative:      Collected By:[id number] EBER PANTOJA  Need a WET SWAB for admission to Rehab.  Is patient being admitted?->Yes  Does this patient meet criteria for Rush/Cepheid per [facility]  Inpatient Workflow? (See workflow " link below)->No  Expected turn around time?->Routine (In-House PCR up to 24  hours)  Have you been in close contact with a person who is suspected  or known to be positive for COVID-19 within the last 30 days  (e.g. last seen that person < 30 days ago)->Unknown               ASSESSMENT/PLAN:    POD#11 s/p Right BKA.    Incision well approximated.  No drainage.  Contacted ability to initiate  sock.  Continue knee immobilizer.  WBCs slightly decreased to 18.2.  Patient more alert working with PT/OT.  Okay to discharge to rehab from LPS/Ortho standpoint once medically cleared.      Wound care:   -Wound care orders updated for nursing  -Right BKA: Nonadhesive foam, Hypafix tape,  sock, knee immobilizer.  Change every 48 hours  -Left heel: Heel Mepilex this evening, heel float boot now.  Change heel Mepilex every 48 hours  Severe risk for pressure injury to left heel secondary to PVD and peripheral neuropathy.  Contact wound team if pressure ulcer develops.    Imaging/Labs:  -COVID-19: not detected this admission 1/4/2021     Vascular status:   - Palpable popliteal pulses +2     Surgery:   No further surgeries at this time.     Antibiotics:   On Zosyn.  Managed by hospitalist for pneumonia, UTI     Weight Bearing Status:   -Non Weight bearing to right BKA     Offloading:   -Immobilizer; in place  ability prosthetics involved.    sock to be initiated. Discussed with Brooke Pineda CPO     PT/OT:   -involved.  Worked with patient today        Diabetes Education:   - not necessary at this time.      - Implications of loss of protective sensation (LOPS) discussed with patient- including increased risk for amputation.  Advised to check feet at least daily, moisturize feet, and to always wear protective foot wear.   -avoid trimming own nails. See podiatrist or certified foot and nail RN  -keep blood sugars <150 for improved wound healing           DISCHARGE PLAN:    Disposition: Anticipate discharge to  [facility] rehab once medically cleared.  Okay to discharge from [facility] Ortho standpoint    Follow-up: Dr. Lynn, sutures to be removed approximately 3 weeks post-op      Discussed with: pt, RN, Dr. Lynn      Please note that this dictation was created using voice recognition software. I have  worked with technical experts from [facility] to optimize the interface.  I have made every reasonable attempt to correct obvious errors, but there may be errors of grammar and possibly content that I did not discover before finalizing the note.      Carla Farah, PETRONA.P.R.N.    If any questions or concerns, please contact [facility] through voalte.

## 2021-01-11 NOTE — PROGRESS NOTES
Report received from RN, assumed care at 0700  Pt is A0X4, and responds appropriately   Pt declines any SOB, chest pain, new onset of numbness/ tingiling  Pt rates pain at 3/10, on a scale of 1-10, pt medicated per MAR  Pt is voiding adequatly and without hesitancy  Pt has + flatus, + bowel sounds, + BM on 1/11/2021 pt having multiple, loose, frequent stools   Pt ambulates with a x2 assist and a FWW, pt is NWB on RLE  Pt is tolerating a consistent CHO diabetic diet, pt denies any nausea/vomiting  Pt has BKA site to RLE, dressing is clean, dry and intact with immobilizer in place   Pt has redness/ skin breakdown from incontinence to buttocks, barrier cream applied, Q2H turns in place   Pt is on TELE, lead placement verified, monitor room updated on RN number     Plan of care discussed, all questions answered. Explained importance of calling before getting OOB and pt verbalizes understanding. Explained importance of oral care. Call light is within reach, treaded slipper socks on, bed in lowest/ locked position, hourly rounding in place, all needs met at this time

## 2021-01-11 NOTE — PROGRESS NOTES
Pt laying in bed, call light within reach, bed lowered and locked, fall education reinforced. Bed alarm and waffle mattress in place. Pt lung sounds are diminished in the lower lobes, bowel sounds are hypoactive in all four quadrants, heart sounds are tachycardic. Pt is A&Ox4 and forgetful on room air. Pt is x2 assist to mobilize with FWW. Pt is NWB of the right lower extremity. Pt has a right BKA with dressing of ABD pad, kerlex, and ace wrap CDI. Pt has a palacios catheter in place draining a small amount of yellow urine CDI. Pt has a left arm AV fistula site with dry gauze dressing CDI. Pt has bilateral buttock and sacral redness and excoriation, barrier cream applied as needed. Pt is calm and resting at this time, no needs noted.

## 2021-01-11 NOTE — DIETARY
Nutrition Services: Brief Update    RD previously following pt for tube feedings.  Swallow evaluation by SLP 1/10; diet advanced to Renal, Consistent CHO.  Cortrak has been removed and tube feeding order has been discontinued.  Per ADL flow sheet, PO has been 25-75% of meals.    Recommendations/Plan:  1. Encourage intake of meals.  2. Document intake of all meals as % taken in ADLs to provide interdisciplinary communication across all shifts.   3. Monitor weight.  4. Nutrition rep will continue to see patient for ongoing meal and snack preferences.     RD will continue to follow.

## 2021-01-11 NOTE — PROGRESS NOTES
[facility] Medicine Daily Progress Note    Date of Service  1/11/2021    Chief Complaint  75 y.o. male admitted 12/31/2020 with diabetic foot ulcer.    Hospital Course  Admitted for scheduled right BKA for diabetic foot ulcer.  He has known history of peripheral vascular disease, underwent right first toe amputation on May 2020, with nonhealing wound.  He has known history of end-stage renal disease on hemodialysis and Nephrology was consulted on the case.  He has required transfusion for his Anemia.  Postoperatively he developed pneumonia likely secondary to dysphagia.  He was started on IV Unasyn.  Swallow evaluation was done by SLP and his diet was downgraded to level 4, liquid level 2.  He also developed encephalopathy, secondary to multifactorial causes.  His antibiotics were changed to IV Zosyn as his leukocytosis was trending up.  At some point he required n.p.o. secondary to increasing encephalopathy.  His encephalopathy later improved, and he is now back on a regular diet.  His leukocytosis improved with a change in antibiotic but is now trending up again, with unclear source.    Interval Problem Update  BKA - pain controlled  Anemia - hgb 9.7  HTN - sbp 110-150  Diabetes - 100-160  Resp failure -  Off O2   Encephalopathy - more alert and oriented   Pneumonia - CXR clear  Pyuria - culture pending  Leukocytosis - 38812, Tmax 98.8    Consultants/Specialty  LPS  Nephrology  Palliative care    Code Status  Full Code    Disposition  Rehab    Review of Systems  Review of Systems   Constitutional: Positive for malaise/fatigue. Negative for chills, diaphoresis and fever.   HENT: Negative for congestion, hearing loss and sore throat.    Eyes: Negative for blurred vision.   Respiratory: Positive for cough. Negative for shortness of breath and wheezing.    Cardiovascular: Negative for palpitations and leg swelling.   Gastrointestinal: Negative for abdominal pain, diarrhea, heartburn, nausea and vomiting.    Genitourinary: Negative for dysuria, flank pain and hematuria.   Musculoskeletal: Negative for back pain, joint pain, myalgias and neck pain.   Skin: Negative for rash.   Neurological: Positive for weakness. Negative for dizziness, sensory change, speech change, focal weakness and headaches.   Psychiatric/Behavioral: The patient is not nervous/anxious.         Physical Exam  Temp:  [36.6 °C (97.8 °F)-37.1 °C (98.8 °F)] 36.8 °C (98.3 °F)  Pulse:  [78-90] 78  Resp:  [16] 16  BP: (117-156)/(57-71) 117/57  SpO2:  [94 %-100 %] 95 %    Physical Exam  Vitals signs and nursing note reviewed.   HENT:      Head: Normocephalic and atraumatic.      Nose: No congestion.      Mouth/Throat:      Mouth: Mucous membranes are moist.   Eyes:      Extraocular Movements: Extraocular movements intact.      Conjunctiva/sclera: Conjunctivae normal.      Pupils: Pupils are equal, round, and reactive to light.   Neck:      Musculoskeletal: No muscular tenderness.   Cardiovascular:      Rate and Rhythm: Normal rate and regular rhythm.   Pulmonary:      Effort: Pulmonary effort is normal.      Breath sounds: Rales present.   Abdominal:      General: Bowel sounds are normal. There is no distension.      Palpations: Abdomen is soft.      Tenderness: There is no abdominal tenderness. There is no guarding or rebound.   Musculoskeletal:      Left lower leg: No edema.      Comments: Right BKA, incision c/d/i   Skin:     General: Skin is warm and dry.   Neurological:      General: No focal deficit present.      Mental Status: He is alert and oriented to person, place, and time.      Cranial Nerves: No cranial nerve deficit.   Psychiatric:         Attention and Perception: Attention normal.         Speech: Speech normal.         Cognition and Memory: Memory is impaired.         Fluids    Intake/Output Summary (Last 24 hours) at 1/11/2021 1446  Last data filed at 1/11/2021 1150  Gross per 24 hour   Intake 1000 ml   Output 225 ml   Net 775 ml        Laboratory  Recent Labs     01/09/21  0839 01/10/21  0817 01/11/21  1023   WBC 12.3* 16.6* 18.2*   RBC 2.74* 2.72* 3.11*   HEMOGLOBIN 8.7* 8.5* 9.7*   HEMATOCRIT 30.4* 27.5* 31.0*   .9* 101.1* 99.7*   MCH 31.8 31.3 31.2   MCHC 28.6* 30.9* 31.3*   RDW 66.2* 60.4* 56.4*   PLATELETCT 339 354 391   MPV 8.8* 8.8* 8.9*     Recent Labs     01/09/21  0839 01/10/21  0817 01/11/21  1023   SODIUM 140 140 136   POTASSIUM 4.7 4.2 3.8   CHLORIDE 96 100 98   CO2 25 24 23   GLUCOSE 120* 150* 171*   BUN 34* 25* 34*   CREATININE 6.44* 5.36* 6.89*   CALCIUM 9.2 9.1 9.5                   Imaging  DX-CHEST-LIMITED (1 VIEW)   Final Result      1.  No acute cardiopulmonary disease.      2.  Stable interstitial opacity or fibrotic change in the left lower lobe.      DX-CHEST-LIMITED (1 VIEW)   Final Result      1.  Minimal left lower lobe interstitial edema or pneumonia.      2.  Clear right lung.      DX-CHEST-LIMITED (1 VIEW)   Final Result      Improving left basilar opacity, likely sequela of pneumonia. No new consolidation or pleural effusions.      CT-HEAD W/O   Final Result         1.  No acute intracranial abnormality.      DX-CHEST-LIMITED (1 VIEW)   Final Result      1.  Minimal interstitial opacity in the lateral aspect of the left lower lobe which represent minimal interstitial edema, pneumonia, and/or fibrotic change.      2.  The remainder of the lung parenchyma is clear.      3.  No pleural effusion.      IR-CVC TUNNEL W/O PORT REMOVAL   Final Result      Removal of the right internal jugular hemodialysis catheter.      US-HEMODIALYSIS GRAFT DUPLEX COMP UPPER EXTREMITY   Final Result      US-EXTREMITY VENOUS LOWER BILAT    (Results Pending)        Assessment/Plan  * Diabetic foot ulcer (HCC)- (present on admission)  Assessment & Plan  Right below knee amputation. 12/31/2020  Pain control - scheduled Tylenol, Oxycodone prn  PT and OT    Leukocytosis  Assessment & Plan  Follow cultures  Check venous duplex  May  need CT chest     Encephalopathy acute  Assessment & Plan  Avoid benzodiazepines and anticholinergics  Frequent orientation  Avoid early morning labs  Avoid vital signs during sleep  Ambulate if possible  STOPPED MS Contin and Xanax, decreased Oxycodone dose    Pneumonia  Assessment & Plan  IV Zosyn  Follow culture    Pyuria  Assessment & Plan  Follow culture    Urinary retention- (present on admission)  Assessment & Plan  Tomas placed    Frailty syndrome in geriatric patient- (present on admission)  Assessment & Plan  5 hospitalizations in 1 year  Palliative care    Acute respiratory failure with hypoxia (HCC)- (present on admission)  Assessment & Plan  RT protocol    Peripheral vascular disease (HCC)- (present on admission)  Assessment & Plan  Hold Eliquis    Dysphagia- (present on admission)  Assessment & Plan  Renal Diabetoc diet, thin liquids  SLP to follow    Paroxysmal A-fib (HCC)- (present on admission)  Assessment & Plan  Metoprolol  Hold Eliquis    End stage renal disease on dialysis ([facility])- (present on admission)  Assessment & Plan  HD per Nephrology    Type 2 diabetes mellitus with kidney complication, with long-term current use of insulin ([facility])- (present on admission)  Assessment & Plan  Monitor bmp    Anemia- (present on admission)  Assessment & Plan  Transfused 3 u PRBC  Follow cbc  Epogen    Essential hypertension- (present on admission)  Assessment & Plan  Metoprolol    CAD (coronary artery disease)- (present on admission)  Assessment & Plan  History of stents to proximal and mid LAD  Metoprolol, Atorvastatin    Hyperthyroidism- (present on admission)  Assessment & Plan  Methimazole  TSH wnl    Hyponatremia- (present on admission)  Assessment & Plan  Follow bmp    Dyslipidemia- (present on admission)  Assessment & Plan  Atorvastatin       VTE prophylaxis: SCD

## 2021-01-11 NOTE — PROGRESS NOTES
[facility] -  PROGRESS NOTE               Author: TABATHA Alvarez Date & Time: 1/11/2021  10:29 AM     HPI:   A 75-year-old male with end-stage renal disease   on chronic hemodialysis Tuesdays, Thursdays and Saturdays.  The patient was   admitted on 12/31 for right below-the-knee amputation.  He had wound and   gangrene of the right foot.  He has had some peripheral vascular procedures   previously.  He did have a right ray amputation in 11/2020.  He has had a   nonhealing wound and he was admitted for right below-the-knee amputation that   was performed on 12/31.  We have been consulted to assist in his dialysis   needs.  He had dialysis on 12/31 prior to admission.  Today, he is feeling   dyspneic.  He has no cough or sputum production.    DAILY NEPHROLOGY SUMMARY:  1/1 - Consult seen  1/2 - Tolerated HD yesterday via TDC  1/3 - Tolerated HD via AVF.  No issues per HD nurse.  Complains of itchiness this morning.  Requesting benadryl  1/4: No acute events, pending rehab placement, tired this am, sedated.   1/5: Pending transfer to inpatient rehab when medically cleared, permacath removed   1/6: feels slightly congeted, tolerated HD, s/p PRBC transfusion with HGB to 7.5 but downt o 6.6 this AM  1/7: No acute events, s/p PRBC transfusion  1/8: Tolerated HD, ongoing encephalopathy after stopping narcotics.   1/9: Mentally more clear, discussions yesterday to establish GOC, he mentioned his wishes abut considering comfort measures and has discussed these with his wife and son but hasn't made a firm decision yet.   1/10; mentally more clear today.  Remembered discussion he had with me yesterday..  More cheerful today and does not want to discuss comfort care, looking forward to go to rehab  1/11: Pt sitting up in bed, feels ok, denies any current complaints, needs Covid test to transition to rehab, VSS    REVIEW OF SYSTEMS:    10 point ROS was performed and is as per HPI or otherwise  "negative     PMH/PSH/SH/FH: Reviewed and unchanged since admission note  CURRENT MEDICATIONS: Reviewed from admission to present day    PHYSICAL EXAM:  VS:  /68   Pulse 78   Temp 36.6 °C (97.8 °F) (Temporal)   Resp 16   Ht 1.676 m (5' 5.98\")   Wt 71.9 kg (158 lb 8.2 oz)   SpO2 97%   BMI 25.60 kg/m²   GENERAL: no acute distress  CV: trace edema   RESP: non-labored  GI: Soft  MSK: R. BKA   SKIN: No concerning rashes  NEURO: AOx3  PSYCH: Cooperative  ACCESS: L AVF +T/B    Fluids:  In: 1340 [P.O.:1140]  Out: 175     LABS:  Recent Labs     01/08/21  1042 01/09/21  0839 01/10/21  0817   SODIUM 136 140 140   POTASSIUM 4.6 4.7 4.2   CHLORIDE 95* 96 100   CO2 23 25 24   GLUCOSE 106* 120* 150*   BUN 24* 34* 25*   CREATININE 4.15* 6.44* 5.36*   CALCIUM 9.0 9.2 9.1       IMPRESSION:  #  End-stage renal disease, on chronic hemodialysis qTTS  - Normally dialyzes at [facility] via L AVF   #  Status post right below-the-knee amputation on 12/31/2020 for diabetic ulcer.  #  Hypertension, controlled.  #  Anemia: superimposed blood loss in addition to chronic kidney disease.  - Hgb below target   #  Diabetes mellitus.  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  - On calcitriol and calcium acetate   #  Peripheral vascular disease.  #  Paroxysmal atrial fibrillation, on Eliquis and metoprolol.   #  Coronary artery disease, status post stents.  Asymptomatic.  #  History of hyperthyroidism. On methimazole.   #  Dyslipidemia, on statin therapy.   #  Past history of heavy tobacco use.  #  Pneumonia: on IV Zosyn   #  Encephalopathy: resolving.   #  Weakness/debility     PLAN:  -Continue hemodialysis qTTS/PRN, next treatment due TUES   -Ultrafiltration as tolerated  -ARMANDO TIW with HD, goal Hgb 10-11   -Transfuse PRN Hgb <7  -Continue home medications   -No dietary protein restrictions  -Abx per primary  -Dose meds for ESRD  -Limit narcotics/sedating meds  -PT/OT  -Will follow at Rehab once transferred   -Follow labs      Thank " you,

## 2021-01-11 NOTE — DISCHARGE PLANNING
Anticipated Discharge Disposition:   [facility]    Action:   · Completed chart review and discussed pt's POC in IDT rounds  · [facility] request PCR Covid test 48 hours prior to admission. Dr. Su states he will contact [facility] when pt is medically stable for discharge.   · Per GIO Oneill RN, ordered Covid test.     Barriers to Discharge:   · Negative Covid  · Medical clearance     Plan:   · Continue to collaborate with the pt, pt's family, and health care team to provide social and discharge support as needed.

## 2021-01-11 NOTE — PROGRESS NOTES
Monitor Summary    SR 79-92  Rare PVCs and rare PACs  .20/.16/.40    Per monitor room strip summary

## 2021-01-12 PROBLEM — R79.89 ELEVATED TROPONIN: Status: RESOLVED | Noted: 2019-11-28 | Resolved: 2021-01-01

## 2021-01-12 PROBLEM — Z95.5 PRESENCE OF STENT IN LAD CORONARY ARTERY: Status: RESOLVED | Noted: 2020-03-26 | Resolved: 2021-01-01

## 2021-01-12 PROBLEM — E87.1 HYPONATREMIA: Status: RESOLVED | Noted: 2018-06-25 | Resolved: 2021-01-01

## 2021-01-12 PROBLEM — I25.10 CORONARY ARTERY DISEASE, NON-OCCLUSIVE: Status: RESOLVED | Noted: 2020-06-02 | Resolved: 2021-01-01

## 2021-01-12 PROBLEM — I21.4 NSTEMI (NON-ST ELEVATED MYOCARDIAL INFARCTION) (HCC): Status: RESOLVED | Noted: 2019-11-11 | Resolved: 2021-01-01

## 2021-01-12 PROBLEM — I95.89 HYPOTENSION DUE TO HYPOVOLEMIA: Status: RESOLVED | Noted: 2019-12-02 | Resolved: 2021-01-01

## 2021-01-12 PROBLEM — J96.01 ACUTE RESPIRATORY FAILURE WITH HYPOXIA (HCC): Status: RESOLVED | Noted: 2021-01-01 | Resolved: 2021-01-01

## 2021-01-12 PROBLEM — E86.1 HYPOTENSION DUE TO HYPOVOLEMIA: Status: RESOLVED | Noted: 2019-12-02 | Resolved: 2021-01-01

## 2021-01-12 PROBLEM — R82.81 PYURIA: Status: RESOLVED | Noted: 2021-01-01 | Resolved: 2021-01-01

## 2021-01-12 NOTE — PROGRESS NOTES
Bedside report received. Assessment completed.  Pt is A&O x4. Pt on room air.   Medicating for pain PRN per MAR.  Denies nausea.  Reports numbness and tingling at R BKA site.   RLE BKA. Immobilizer in place. Excoriated bottom. Barrier wipes and barrier cream in use.    LDA infusing IV ABX.    Last BM 1/11 . +flatus,   +void.  Cardiac renal consistent CHO diet. Tolerates well.   Pt up x2 assist. Stand and pivot to bedside commode.   Call light and belongings within reach. All needs met at this time. Fall Precautions and hourly rounding in place.

## 2021-01-12 NOTE — PROGRESS NOTES
Pt palacios d/c 1410. Pt tolerated well, noted some dried discharge and excoriation around top of penis. Pt states area is tender. Pt educated on need to void and residual trial.

## 2021-01-12 NOTE — THERAPY
"Occupational Therapy  Daily Treatment     Patient Name: Luis Sepulveda  Age:  75 y.o., Sex:  male  Medical Record #: [id number]  Today's Date: 1/12/2021     Precautions  Precautions: Fall Risk, Immobilizer Right Lower Extremity, Swallow Precautions ( See Comments)  Comments: reg/thins    Assessment    Pt progressing well with OT. Pt able to use FWW to hop to toilet and able to complete toilet transfer with Simone and verbal cues for sequencing. Pt completed toileting with spv and transferred to chair with Simone. Pt able to complete seated grooming/hygiene with setup.     Plan    Continue current treatment plan.    DC Equipment Recommendations: Unable to determine at this time  Discharge Recommendations: Recommend post-acute placement for additional occupational therapy services prior to discharge home    Subjective    Pt alert, very pleasant, and cooperative with OT tx session. Pt states, \"I have some sores on my butt that really hurt.\"     Objective       01/12/21 1450   Cognition    Level of Consciousness Alert   Comments cognition appears improved as compared to previous sessions, alert, pleasant, cooperative, expressed frustration regarding attention/care but otherwise very pleasant   Balance   Comments seated at SBA, standing with Simone using FWW, no overt LOB throughout session   Bed Mobility    Supine to Sit Supervised   Comments HOB elevated, use of bed rails   Activities of Daily Living   Grooming Supervision;Seated  (oral care)   Toileting Supervision   Functional Mobility   Sit to Stand Supervised   Toilet Transfers Minimal Assist   Mobility in room/bathroom with FWW   Comments able to hop in room/bathroom with FWW, no overt LOB   Activity Tolerance   Comments seated and standing rest breaks throughout session, increased WOB/SOB   Patient / Family Goals   Patient / Family Goal #1 go home   Short Term Goals   Short Term Goal # 1 SBA necessary functional transfers   Goal Outcome # 1 Progressing as expected "   Short Term Goal # 2 stand at sink with supervision to complete grooming, light ADL tasks   Goal Outcome # 2 Progressing as expected   Short Term Goal # 3 tolerate 5 minutes of continuous standing ADL activity, prior to resting   Goal Outcome # 3 Progressing as expected   Short Term Goal # 4 distant supervision for toileting   Goal Outcome # 4 Progressing as expected

## 2021-01-12 NOTE — PROGRESS NOTES
Bedside report received.  Assessment complete.  A&O x 4. Patient calls appropriately.  Patient has 2/10 pain. Declines pain meds.   Denies N&V. Tolerating diet.  Surgical R BKA with immobilizer in place.  Excoriated gluteal cleft. Barrier wipes and barrier paste applied and in use.   + void via palacios, + flatus, + BM per dialysis x4 soft and loose stools 1/12.  Patient denies SOB.  Tele in use, lead placement verified.   x2 assist to stand and pivot. NWB to RLE.  Review plan with of care with patient. Call light and personal belongings with in reach. Hourly rounding in place. All needs met at this time.

## 2021-01-12 NOTE — CARE PLAN
Problem: Communication  Goal: The ability to communicate needs accurately and effectively will improve  Outcome: PROGRESSING AS EXPECTED     Problem: Safety  Goal: Will remain free from injury  Outcome: PROGRESSING AS EXPECTED  Goal: Will remain free from falls  Outcome: PROGRESSING AS EXPECTED     Problem: Infection  Goal: Will remain free from infection  Outcome: PROGRESSING AS EXPECTED     Problem: Venous Thromboembolism (VTW)/Deep Vein Thrombosis (DVT) Prevention:  Goal: Patient will participate in Venous Thrombosis (VTE)/Deep Vein Thrombosis (DVT)Prevention Measures  Outcome: PROGRESSING AS EXPECTED     Problem: Bowel/Gastric:  Goal: Normal bowel function is maintained or improved  Outcome: PROGRESSING AS EXPECTED  Goal: Will not experience complications related to bowel motility  Outcome: PROGRESSING AS EXPECTED     Problem: Knowledge Deficit  Goal: Knowledge of disease process/condition, treatment plan, diagnostic tests, and medications will improve  Outcome: PROGRESSING AS EXPECTED  Goal: Knowledge of the prescribed therapeutic regimen will improve  Outcome: PROGRESSING AS EXPECTED     Problem: Discharge Barriers/Planning  Goal: Patient's continuum of care needs will be met  Outcome: PROGRESSING AS EXPECTED     Problem: Pain Management  Goal: Pain level will decrease to patient's comfort goal  Outcome: PROGRESSING AS EXPECTED     Problem: Respiratory:  Goal: Respiratory status will improve  Outcome: PROGRESSING AS EXPECTED     Problem: Urinary Elimination:  Goal: Ability to reestablish a normal urinary elimination pattern will improve  Outcome: PROGRESSING AS EXPECTED     Problem: Mobility  Goal: Risk for activity intolerance will decrease  Outcome: PROGRESSING AS EXPECTED     Problem: Skin Integrity  Goal: Risk for impaired skin integrity will decrease  Outcome: PROGRESSING AS EXPECTED     Problem: Psychosocial Needs:  Goal: Level of anxiety will decrease  Outcome: PROGRESSING AS EXPECTED

## 2021-01-12 NOTE — PROGRESS NOTES
[facility] Progress Note     Hemodialysis treatment ordered today per Dr. Tristan x 3.5 hours.   Treatment initiated at 0919 ended at 1254.      Patient tolerated tx; see paper flow sheet for details.  Bowel movement 4x during HD small to moderate amount, yellowish, loose, changed; informed primary RN     Net UF 2000 mL.      Needles removed from access site. Dressings applied and sites held x 10 minutes; verified no bleeding. Positive bruit/thrill post tx.   Staff RN to monitor AVF/G for breakthrough bleeding. Should breakthrough bleeding occur, staff RN to apply pressure to access sites until bleeding resolved.   Notify Dialysis and Nephrologist for follow-up.     Report given to Primary RN Ananya Armas.

## 2021-01-12 NOTE — DISCHARGE PLANNING
[facility] Transitional Care Coordination     Follow up for Rehab.  Not medically cleared for transfer today per Dr. Radha Bowman.  Will continue to follow for IRF pending medical readiness.

## 2021-01-12 NOTE — PROGRESS NOTES
[facility] -  PROGRESS NOTE               Author: [place] Tristan M.D. Date & Time: 1/12/2021  8:59 AM     HPI:   A 75-year-old male with end-stage renal disease   on chronic hemodialysis Tuesdays, Thursdays and Saturdays.  The patient was   admitted on 12/31 for right below-the-knee amputation.  He had wound and   gangrene of the right foot.  He has had some peripheral vascular procedures   previously.  He did have a right ray amputation in 11/2020.  He has had a   nonhealing wound and he was admitted for right below-the-knee amputation that   was performed on 12/31.  We have been consulted to assist in his dialysis   needs.  He had dialysis on 12/31 prior to admission.  Today, he is feeling   dyspneic.  He has no cough or sputum production.    DAILY NEPHROLOGY SUMMARY:  1/1 - Consult seen  1/2 - Tolerated HD yesterday via TDC  1/3 - Tolerated HD via AVF.  No issues per HD nurse.  Complains of itchiness this morning.  Requesting benadryl  1/4: No acute events, pending rehab placement, tired this am, sedated.   1/5: Pending transfer to inpatient rehab when medically cleared, permacath removed   1/6: feels slightly congeted, tolerated HD, s/p PRBC transfusion with HGB to 7.5 but downt o 6.6 this AM  1/7: No acute events, s/p PRBC transfusion  1/8: Tolerated HD, ongoing encephalopathy after stopping narcotics.   1/9: Mentally more clear, discussions yesterday to establish GOC, he mentioned his wishes abut considering comfort measures and has discussed these with his wife and son but hasn't made a firm decision yet.   1/10; mentally more clear today.  Remembered discussion he had with me yesterday..  More cheerful today and does not want to discuss comfort care, looking forward to go to rehab  1/11: Pt sitting up in bed, feels ok, denies any current complaints, needs Covid test to transition to rehab, VSS  1/12: Seen on HD today, no new complaints, No acute issues overnight, stable overall  "    REVIEW OF SYSTEMS:    10 point ROS was performed and is as per HPI or otherwise negative     PMH/PSH/SH/FH: Reviewed and unchanged since admission note  CURRENT MEDICATIONS: Reviewed from admission to present day    PHYSICAL EXAM:  VS:  /67   Pulse 71   Temp 36.6 °C (97.9 °F) (Temporal)   Resp 16   Ht 1.676 m (5' 5.98\")   Wt 71.9 kg (158 lb 8.2 oz)   SpO2 99%   BMI 25.60 kg/m²   GENERAL: no acute distress  CV: trace edema   RESP: non-labored  GI: Soft  MSK: R. BKA   SKIN: No concerning rashes  NEURO: AOx3  PSYCH: Cooperative  ACCESS: L AVF +T/B    Fluids:  In: 580 [P.O.:480]  Out: 100     LABS:  Recent Labs     01/10/21  0817 01/11/21  1023   SODIUM 140 136   POTASSIUM 4.2 3.8   CHLORIDE 100 98   CO2 24 23   GLUCOSE 150* 171*   BUN 25* 34*   CREATININE 5.36* 6.89*   CALCIUM 9.1 9.5       IMPRESSION:  #  End-stage renal disease, on chronic hemodialysis qTTS  - Normally dialyzes at [facility] via L AVF   #  Status post right BKA on 12/31/2020 for diabetic ulcer.  #  Hypertension, controlled.  #  Anemia: superimposed blood loss in addition to chronic kidney disease.  - Hgb below target   #  Diabetes mellitus.  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  - On calcitriol and calcium acetate   #  Peripheral vascular disease.  #  Paroxysmal atrial fibrillation, on Eliquis and metoprolol.   #  Coronary artery disease, status post stents.  Asymptomatic.  #  History of hyperthyroidism. On methimazole.   #  Dyslipidemia, on statin therapy.   #  Past history of heavy tobacco use.  #  Pneumonia: on IV Zosyn   #  Encephalopathy: resolving.   #  Weakness/debility     PLAN:  -Continue hemodialysis qTTS/PRN   -Ultrafiltration as tolerated  -ARMANDO TIW with HD, goal Hgb 10-11   -Transfuse PRN Hgb <7  -Continue home medications   -No dietary protein restrictions  -Abx per primary  -Dose meds for ESRD  -Limit narcotics/sedating meds  -PT/OT  -Will follow at Rehab once transferred   -Follow labs    Ok to discharge to Rehab " from renal standpoint once clear medically     Thank you,

## 2021-01-12 NOTE — PROGRESS NOTES
Monitor summary    [id number]  6bts of [facility]  BBB    .18/.12/.40    Per monitor room strip summary

## 2021-01-12 NOTE — PROGRESS NOTES
Hospital Medicine Daily Progress Note    Date of Service  1/12/2021    Chief Complaint    75 y.o.  male admitted 12/31/2020 with diabetic right foot ulcer    Hospital Course  75-year-old male past medical history diabetes, ESRD on hemodialysis, PAD, CAD s/p LAD stent, hypothyroidism, A. fib on Eliquis was admitted for planned right BKA which was performed 12/31/2020.  Post surgery patient developed pneumonia.  He was started on IV Unasyn 1/6/2021 with acute respiratory failure requiring oxygen. He also required blood transfusion 01/4.  Later on antibiotic were changed to IV Zosyn due to persistent leukocytosis.  Also during hospital stay he developed encephalopathy secondary to multifactorial causes which is continued to improve.     Interval Problem Update  Patient did have few beats of V. tach asymptomatic.  Leukocytosis still persistent. No complains otherwise     Consultants/Specialty  Nephrology  Palliative  Orthopedic  LPS    Code Status  Full Code    Disposition  Inpatient/rehab when accepted    Review of Systems  Review of Systems   Constitutional: Negative for chills and fever.   Respiratory: Negative for cough and shortness of breath.    Cardiovascular: Negative for chest pain and palpitations.   Genitourinary: Negative for dysuria.   Musculoskeletal: Negative for back pain.   Skin: Negative for rash.   Neurological: Negative for dizziness and headaches.        Physical Exam  Temp:  [36.4 °C (97.6 °F)-37.2 °C (98.9 °F)] 36.6 °C (97.9 °F)  Pulse:  [71-82] 71  Resp:  [16-18] 16  BP: (108-153)/(49-73) 147/67  SpO2:  [95 %-99 %] 99 %    Physical Exam  Vitals signs and nursing note reviewed.   Constitutional:       General: He is not in acute distress.     Appearance: Normal appearance. He is obese. He is not ill-appearing.   HENT:      Head: Normocephalic and atraumatic.      Nose: No congestion.   Eyes:      General: No scleral icterus.  Cardiovascular:      Rate and Rhythm: Rhythm irregular.      Heart  sounds: No murmur.   Pulmonary:      Effort: No respiratory distress.      Breath sounds: No wheezing or rales.   Abdominal:      General: There is distension.      Palpations: Abdomen is soft.      Tenderness: There is no abdominal tenderness. There is no guarding.   Neurological:      General: No focal deficit present.      Mental Status: He is alert.         Fluids    Intake/Output Summary (Last 24 hours) at 1/12/2021 1007  Last data filed at 1/12/2021 0745  Gross per 24 hour   Intake 340 ml   Output 300 ml   Net 40 ml       Laboratory  Recent Labs     01/10/21  0817 01/11/21  1023   WBC 16.6* 18.2*   RBC 2.72* 3.11*   HEMOGLOBIN 8.5* 9.7*   HEMATOCRIT 27.5* 31.0*   .1* 99.7*   MCH 31.3 31.2   MCHC 30.9* 31.3*   RDW 60.4* 56.4*   PLATELETCT 354 391   MPV 8.8* 8.9*     Recent Labs     01/10/21  0817 01/11/21  1023   SODIUM 140 136   POTASSIUM 4.2 3.8   CHLORIDE 100 98   CO2 24 23   GLUCOSE 150* 171*   BUN 25* 34*   CREATININE 5.36* 6.89*   CALCIUM 9.1 9.5                   Imaging  US-EXTREMITY VENOUS LOWER BILAT   Final Result      DX-CHEST-LIMITED (1 VIEW)   Final Result      1.  No acute cardiopulmonary disease.      2.  Stable interstitial opacity or fibrotic change in the left lower lobe.      DX-CHEST-LIMITED (1 VIEW)   Final Result      1.  Minimal left lower lobe interstitial edema or pneumonia.      2.  Clear right lung.      DX-CHEST-LIMITED (1 VIEW)   Final Result      Improving left basilar opacity, likely sequela of pneumonia. No new consolidation or pleural effusions.      CT-HEAD W/O   Final Result         1.  No acute intracranial abnormality.      DX-CHEST-LIMITED (1 VIEW)   Final Result      1.  Minimal interstitial opacity in the lateral aspect of the left lower lobe which represent minimal interstitial edema, pneumonia, and/or fibrotic change.      2.  The remainder of the lung parenchyma is clear.      3.  No pleural effusion.      IR-CVC TUNNEL W/O PORT REMOVAL   Final Result       Removal of the right internal jugular hemodialysis catheter.      US-HEMODIALYSIS GRAFT DUPLEX COMP UPPER EXTREMITY   Final Result           Assessment/Plan  * Diabetic foot ulcer (HCC)- (present on admission)  Assessment & Plan  Right below knee amputation. 12/31/2020  Pain control - scheduled Tylenol, Oxycodone prn  PT and OT    Leukocytosis  Assessment & Plan  blood cultures NGT . Afebrile   DVT studies negative   May need CT chest ??     Encephalopathy acute  Assessment & Plan  Avoid benzodiazepines and anticholinergics  Frequent orientation  Avoid early morning labs  Avoid vital signs during sleep  Ambulate if possible  STOPPED MS Contin and Xanax, decreased Oxycodone dose  1/12- almost at his baseline     Pneumonia  Assessment & Plan  Off oxygen. Last dose of zosyn today   Repeat CBC tomorrow     Urinary retention- (present on admission)  Assessment & Plan  Tomas placed    Frailty syndrome in geriatric patient- (present on admission)  Assessment & Plan  5 hospitalizations in 1 year  Palliative care    Peripheral vascular disease (HCC)- (present on admission)  Assessment & Plan  Hold Eliquis    Dysphagia- (present on admission)  Assessment & Plan  Renal Diabetoc diet, thin liquids  SLP to follow    Paroxysmal A-fib ([facility])- (present on admission)  Assessment & Plan  Metoprolol  Will discuss with pharmacy about restarting eliquis     End stage renal disease on dialysis ([facility])- (present on admission)  Assessment & Plan  HD per Nephrology    Type 2 diabetes mellitus with kidney complication, with long-term current use of insulin ([facility])- (present on admission)  Assessment & Plan  Monitor bmp    Anemia- (present on admission)  Assessment & Plan  Transfused 3 u PRBC  Follow cbc  Epogen    Essential hypertension- (present on admission)  Assessment & Plan  Metoprolol    CAD (coronary artery disease)- (present on admission)  Assessment & Plan  History of stents to proximal and mid LAD  Metoprolol,  Atorvastatin    Hyperthyroidism- (present on admission)  Assessment & Plan  Methimazole  TSH wnl    Dyslipidemia- (present on admission)  Assessment & Plan  Atorvastatin       VTE prophylaxis: SCD

## 2021-01-13 PROBLEM — R33.9 URINARY RETENTION: Status: RESOLVED | Noted: 2021-01-01 | Resolved: 2021-01-01

## 2021-01-13 PROBLEM — E11.621 DIABETIC FOOT ULCER (HCC): Status: RESOLVED | Noted: 2020-01-01 | Resolved: 2021-01-01

## 2021-01-13 PROBLEM — Z89.511 STATUS POST BELOW-KNEE AMPUTATION OF RIGHT LOWER EXTREMITY (HCC): Status: ACTIVE | Noted: 2021-01-01

## 2021-01-13 PROBLEM — R13.10 DYSPHAGIA: Status: RESOLVED | Noted: 2020-02-18 | Resolved: 2021-01-01

## 2021-01-13 PROBLEM — G93.40 ENCEPHALOPATHY ACUTE: Status: RESOLVED | Noted: 2021-01-01 | Resolved: 2021-01-01

## 2021-01-13 PROBLEM — L97.509 DIABETIC FOOT ULCER (HCC): Status: RESOLVED | Noted: 2020-01-01 | Resolved: 2021-01-01

## 2021-01-13 PROBLEM — Z00.00 HEALTH CARE MAINTENANCE: Status: RESOLVED | Noted: 2018-08-15 | Resolved: 2021-01-01

## 2021-01-13 PROBLEM — J18.9 PNEUMONIA: Status: RESOLVED | Noted: 2019-11-28 | Resolved: 2021-01-01

## 2021-01-13 NOTE — FLOWSHEET NOTE
01/13/21 1311   Events/Summary/Plan   Events/Summary/Plan RT assessment   Vital Signs   Pulse 80   Respiration 18   Pulse Oximetry 99 %   $ Pulse Oximetry (Spot Check) Yes   Respiratory Assessment   Level of Consciousness Alert   Breath Sounds   RML Breath Sounds Diminished   RLL Breath Sounds Diminished   LLL Breath Sounds Diminished   Secretions   Cough Productive  (daily, per patient)   Oxygen   O2 Delivery Device None - Room Air   Smoking History   Have you ever smoked Yes   Have you smoked in the last 12 months No   Confirm Quit Date 01/13/11

## 2021-01-13 NOTE — H&P
REHABILITATION HISTORY AND PHYSICAL/POST ADMISSION EVALUATION    1/13/2021  1:58 PM  Luis Sepulveda  [id number]  Admission  1/13/2021 10:52 AM  [facility] Code/Reason for admission: 0005.4 - Amputation: Unilateral Lower Limb Below the Knee (BK)   Etiologic diagnosis/problem: 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)  Chief Complaint: buttock pain    HPI:  The patient is a 75 y.o. male with a past medical history of diabetes, end-stage renal disease on hemodialysis, peripheral artery disease, coronary artery disease, paroxysmal atrial fibrillation, hyperthyroidism, hyperlipidemia; now admitted for acute inpatient rehabilitation with severe functional debility after he required right below the knee amputation.      On admission the patient and medical record report, patient had a history of a right diabetic foot infection that had an amputation of the toe on 11/19/20.  This was not healing and so he was admitted to the hospital on 12/31/20 for an elective right below the knee amputation that occurred with Dr. Lynn.  Postoperatively patient had encephalopathy, was treated for pneumonia with IV Zosyn, required transfusion for acute blood loss anemia, and also had acute urinary retention requiring catheter placement.  He continued on his hemodialysis days Tuesday Thursday Saturday.  Patient had Covid testing which was negative on 1/4 and 1/22.  Surgery deferred DVT prophylaxis to the primary team.  Patient has not yet been started on Eliquis which was his home anticoagulation.    Patient current reports buttock pain from being in the hospital, laying in bed for so long.  He denies any significant residual limb pain.  Of note he reports occasionally when he bumps his left foot he actually feels pain where his right foot used to be.      He reports that he was upset his oral intake was limited at the acute hospital after a pill got stuck 1 day.  He was unable to have thin liquids or any food he  reports for 5 days which she did not think was necessary.  He also confirms that he was confused at the other hospital postoperatively, and that he got angry because he really believed things were happening to him that the staff assured him were not.  He states he was angry at their disbelief.  He denies having any more confusion.    Patient reports that he was making a small amount of urine in the past year since starting dialysis.  Over at the acute hospital he reports they placed a catheter which was painful, and then they removed it.  He has not urinated since this removal.  He reports history of peripheral neuropathy associated with his diabetes, and also associated he thinks with his exposure to agent orange in the [place] War.  He has neuropathy in his feet as well as his bilateral fingers.  Patient wears glasses for reading.  He is right-hand dominant.    Patient was evaluated by Rehab Medicine physician and Physical Therapy, Occupational Therapy and Speech Therapy and determined to be appropriate for acute inpatient rehab and was transferred to [facility] on 1/13/2021 10:52 AM.    With this acute therapeutic intervention, this patient hopes to improve his functional status, and return to independent living with the supportive care of family.    REVIEW OF SYSTEMS:     A complete review of systems was performed and was negative in detail with the exception of items mentioned elsewhere in this document.    PMH:  Past Medical History:   Diagnosis Date   • Arrhythmia     hx a fib   • Arthritis 12/29/2020    knees, ankles, back   • CAD (coronary artery disease)     stents   • Chronic anticoagulation 3/26/2020   • Chronic kidney disease (CKD), stage V (HCC)    • Congestive heart failure (HCC)     hx of   • Dental disorder 12/29/2020    partial upper   • Diabetes    • Hemodialysis patient (HCC)     Tuesday, Thursday, Saturday   • Hypertension    • Kidney failure    • Myocardial infarct (HCC)      ? 2019    • Osteoarthritis    • Peripheral neuropathy    • Pneumonia     per hx   • Sleep apnea     does not use cpap anymore       PSH:  Past Surgical History:   Procedure Laterality Date   • KNEE AMPUTATION BELOW Right 12/31/2020    Procedure: AMPUTATION, BELOW KNEE ...;  Surgeon: Leobardo Lynn M.D.;  Location: [facility];  Service: Orthopedics   • TOE AMPUTATION Right 11/16/2020    Procedure: AMPUTATION, TOE GREAT;  Surgeon: Leobardo Lynn M.D.;  Location: [facility];  Service: Orthopedics   • TOE AMPUTATION Left 5/17/2020    Procedure: AMPUTATION, TOE GREAT;  Surgeon: Leobardo Lynn M.D.;  Location: [facility];  Service: Orthopedics   • TENOTOMY Left 5/17/2020    Procedure: FLEXOR TENOTOMIES, TWO-FIVE TOES;  Surgeon: Leobardo Lynn M.D.;  Location: [facility];  Service: Orthopedics   • APPENDECTOMY     • OTHER CARDIAC SURGERY      stents x1   • OTHER ORTHOPEDIC SURGERY      knee surgery       Family History   Problem Relation Age of Onset   • Heart Disease Mother    • Alcohol/Drug Father    • Thyroid Son    • Diabetes Brother    • Hypertension Neg Hx    • Hyperlipidemia Neg Hx         MEDICATIONS:  Current Facility-Administered Medications   Medication Dose   • hydrOXYzine HCl (ATARAX) tablet 50 mg  50 mg   • melatonin tablet 3 mg  3 mg   • Respiratory Therapy Consult     • Pharmacy Consult Request ...Pain Management Review 1 Each  1 Each   • acetaminophen (Tylenol) tablet 650 mg  650 mg   • lactulose 20 GM/30ML solution 30 mL  30 mL   • docusate sodium (ENEMEEZ) enema 283 mg  283 mg   • fleet enema 133 mL  1 Each   • artificial tears ophthalmic solution 1 Drop  1 Drop   • benzocaine-menthol (CEPACOL) lozenge 1 Lozenge  1 Lozenge   • mag hydrox-al hydrox-simeth (MAALOX PLUS ES or MYLANTA DS) suspension 20 mL  20 mL   • ondansetron (ZOFRAN ODT) dispertab 4 mg  4 mg    Or   • ondansetron (ZOFRAN) syringe/vial injection 4 mg  4 mg   • traZODone (DESYREL) tablet  50 mg  50 mg   • sodium chloride (OCEAN) 0.65 % nasal spray 2 Spray  2 Spray   • traMADol (ULTRAM) 50 MG tablet 50 mg  50 mg   • insulin regular (HumuLIN R,NovoLIN R) injection  1-6 Units    And   • dextrose 50% (D50W) injection 50 mL  50 mL   • atorvastatin (LIPITOR) tablet 40 mg  40 mg   • [START ON 1/14/2021] calcitRIOL (ROCALTROL) capsule 0.25 mcg  0.25 mcg   • calcium acetate (PHOS-LO) 667 MG tablet 1,334 mg  1,334 mg   • [START ON 1/14/2021] epoetin (Retacrit) injection (Dialysis use only) 10,000 Units  10,000 Units   • methimazole (TAPAZOLE) tablet 5 mg  5 mg   • [START ON 1/14/2021] methimazole (TAPAZOLE) tablet 7.5 mg  7.5 mg   • metoprolol (LOPRESSOR) tablet 37.5 mg  37.5 mg   • [START ON 1/14/2021] omeprazole (PRILOSEC) capsule 20 mg  20 mg   • oxyCODONE immediate-release (ROXICODONE) tablet 5 mg  5 mg   • senna-docusate (PERICOLACE or SENOKOT S) 8.6-50 MG per tablet 2 Tab  2 Tab    And   • magnesium hydroxide (MILK OF MAGNESIA) suspension 30 mL  30 mL    And   • bisacodyl (DULCOLAX) suppository 10 mg  10 mg       ALLERGIES:  Mircera [methoxy polyethylene glycol-epoetin beta] and Other drug    PSYCHOSOCIAL HISTORY:  Pre-mobidly, the patient lived in a single level home with no steps to enter, in [place] with his ex-wife and his son.  He also has a daughter that lives in [place].    Patient was in the [employer], in the [place] War.  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  He quit tobacco and alcohol 8 years ago and denies any drugs.    LEVEL OF FUNCTION PRIOR TO DISABILTY:  Independent    LEVEL OF FUNCTION PRIOR TO ADMISSION to [facility]:  PT:  Gait Level Of Assist: Minimal Assist  Assistive Device: Front Wheel Walker  Distance (Feet): 3  Deviation: Step To, Bradykinetic  # of Stairs Climbed: 0  Weight Bearing Status: NWB R LE  Supine to Sit: Supervised  Sit to Supine: (NT, pt left seated in chair at end of session)  Scooting:  "Supervised(seated)  Rolling: (sit pivot)  Skilled Intervention: Verbal Cuing  Comments: HOB elevated, use of bed rails  Sit to Stand: Supervised  Bed, Chair, Wheelchair Transfer: Minimal Assist  Toilet Transfers: Minimal Assist  Transfer Method: Stand Step(hop)  Skilled Intervention: Verbal Cuing, Postural Facilitation, Compensatory Strategies  Sitting in Chair: 10 (toilet), left seated in chair  Sitting Edge of Bed: 5  Standin-7 x 2    OT:  Eating: Minimal Assist(falling asleep and dropped fork x2)  Bathing: Minimal Assist(seated EOB)  Upper Body Dressing: Supervision  Lower Body Dressing: Moderate Assist  Toileting: Supervision  Skilled Intervention: Verbal Cuing  Comments: reported difficulty swallowing/eating; food removed and RN/SLP informed    SLP:  Problem List: Dysphagia, Cognitive-Linguistic Deficits  Diet / Liquid Recommendation: Thin (0), Regular (7)    CURRENT LEVEL OF FUNCTION:   Same as level of function prior to admission to [facility]    PHYSICAL EXAM:     VITAL SIGNS:   height is 1.651 m (5' 5\") and weight is 73 kg (160 lb 15 oz). His oral temperature is 36.4 °C (97.6 °F). His blood pressure is 125/52 and his pulse is 80. His respiration is 18 and oxygen saturation is 99%.     GENERAL: No apparent distress  HEENT: Normocephalic/atraumatic  CARDIAC: Regular rate and rhythm, normal S1, S2, no murmurs, no peripheral edema   LUNGS: Clear to auscultation, normal respiratory effort, on room air   ABDOMINAL: bowel sounds present, soft, nontender and nondistended    EXTREMITIES: no edema  MSK: Right residual limb wrapped, left foot with missing great toe    NEURO:    Mental status: alert, hyper verbose    Motor:  Shoulder flexors:  Right -  5/5, Left -  5/5  Elbow flexors:  Right -  5/5, Left -  5/5  Elbow extensors:  Right -  5/5, Left -  5/5  Symmetrical   Hip flexors:  Right -  deferred, Left -  5/5  Knee ext:  Right -  deferred, Left -  5/5  Dorsiflexors:  Right -  Left -  " 4+/5  Plantar flexors:  Right -  5/5, Left -  5/5     Sensory:   Decreased to light touch on the left foot    RADIOLOGY:              Results for orders placed during the hospital encounter of 11/22/19   MR-BRAIN-W/O    Impression 1.  No acute abnormality.  2.  Mild cerebral atrophy.                                                                                                 Results for orders placed during the hospital encounter of 11/13/20   CT-CTA NECK WITH & W/O-POST PROCESSING    Impression 1.  No significant stenosis or occlusion identified. Diameter reduction in each ICA is between 0% and 49%.    2.  Noncalcified atherosclerotic plaque causing narrowing in the left common carotid artery is again identified.                  LABS:  Recent Labs     01/11/21  1023 01/12/21  0930 01/13/21  0411   SODIUM 136 130* 135   POTASSIUM 3.8 3.5* 3.3*   CHLORIDE 98 94* 98   CO2 23 23 26   GLUCOSE 171* 121* 126*   BUN 34* 39* 21   CREATININE 6.89* 7.36* 4.96*   CALCIUM 9.5 8.9 8.9     Recent Labs     01/11/21  1023 01/12/21  0930 01/13/21  0411   WBC 18.2* 11.1* 11.8*   RBC 3.11* 2.77* 2.90*   HEMOGLOBIN 9.7* 8.8* 9.2*   HEMATOCRIT 31.0* 27.1* 28.7*   MCV 99.7* 97.8 99.0*   MCH 31.2 31.8 31.7   MCHC 31.3* 32.5* 32.1*   RDW 56.4* 54.5* 55.6*   PLATELETCT 391 356 324   MPV 8.9* 8.9* 8.8*         PRIMARY REHAB DIAGNOSIS:    This patient is a 75 y.o. male admitted for acute inpatient rehabilitation with 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).    IMPAIRMENTS:   Cognitive  ADLs/IADLs  Mobility    SECONDARY DIAGNOSIS/MEDICAL CO-MORBIDITIES AFFECTING FUNCTION:    Encephalopathy  Recent urinary retention  End-stage renal disease on hemodialysis  Diabetes with hyperglycemia  Peripheral artery disease  Coronary artery disease  Paroxysmal atrial fibrillation  Hypertension  Hyperlipidemia  Hyperthyroidism  GERD  Anemia  Leukocytosis  Elevated procalcitonin      RELEVANT CHANGES SINCE PREADMISSION  EVALUATION:    Status unchanged    The patient's rehabilitation potential is Good  The patient's medical prognosis is fair    PLAN:   Discussion and Recommendations, discussed with the patient and/or family:   1. The patient requires an acute inpatient rehabilitation program with a coordinated program of care at an intensity and frequency not available at a lower level of care. This recommendation is substantiated by the patient's medical physicians who recommend that the patient's intervention and assessment of medical issues needs to be done at an acute level of care for patient's safety and maximum outcome.     2. A coordinated program of care will be supplied by an interdisciplinary team of physical therapy, occupational therapy, rehab physician, rehab nursing, and, if needed, speech therapy and rehab psychology. Rehab team presents a patient-specific rehabilitation and education program concentrating on prevention of future problems related to accessibility, mobility, skin, bowel, bladder, sexuality, and psychosocial and medical/surgical problems.     3. Need for Rehabilitation Physician: The rehab physician will be evaluating the patient on a multi-weekly basis to help coordinate the program of care. The rehab physician communicates between medical physicians, therapists, and nurses to maximize the patient's potential outcome. Specific areas in which the rehab physician will be providing daily assessment include the following:   A. Assessing the patient's heart rate and blood pressure response (vitals monitoring) to activity and making adjustments in medications or conservative measures as needed.   B. The rehab physician will be assessing the frequency at which the program can be increased to allow the patient to reach optimal functional outcome.   C. The rehab physician will also provide assessments in daily skin care, especially in light of patient's impairments in mobility.   D. The rehab physician will  provide special expertise in understanding how to work with functional impairment and recommend appropriate interventions, compensatory techniques, and education that will facilitate the patient's outcome.     4. Rehab R.N.   The rehab RN will be working with patient to carry over in room mobility and activities of daily living when the patient is not in 3 hours of skilled therapy. Rehab nursing will be working in conjunction with rehab physician to address all the medical issues above and continue to assess laboratory work and discuss abnormalities with the treating physicians, assess vitals, and response to activity, and discuss and report abnormalities with the rehab physician. Rehab RN will also continue daily skin care, supervise bladder/bowel program, instruct in medication administration, and ensure patient safety.     5. Therapies to treat at intensity and frequency of (may change after completion of evaluation by all therapeutic disciplines):       PT:  Physical therapy to address mobility, transfer, gait training and evaluation for adaptive equipment needs 1hour/day at least 5 days/week for the duration of the ELOS (see below)       OT:  Occupational therapy to address ADLs, self-care, home management training, functional mobility/transfers and assistive device evaluation, and community re-integration 1hour/day at least 5 days/week for the duration of the ELOS (see below).        ST/Dysphagia:  Speech therapy to address speech, language, and cognitive deficits as well as swallowing difficulties with retraining/dysphagia management and community re-integration with comprehension, expression, cognitive training 1hour/day at least 5 days/week for the duration of the ELOS (see below).     6. Medical management / Rehabilitation Issues/Adverse Potential affecting function as part of rehabilitation plan.    Right CLOVIS  12/31 Dr. Lynn  Continue full rehab program  PT/OT/SLP, 1 hr each discipline, 5 days per  week    Encephalopathy  Speech therapy for cognitive assessment    Recent urinary retention  Patient oliguric at baseline  Check PVRs    Appreciate the assistance of the hospitalist with his medical comorbidities:    End-stage renal disease on hemodialysis  Consult nephrology  Continue calcitriol  Continue PhosLo  Continue Retacrit    Diabetes with hyperglycemia  Sliding scale insulin    Peripheral artery disease  Restart Eliquis, renal dosing  Continue statin    Coronary artery disease  With history of stents  Continue statin    Paroxysmal atrial fibrillation  Restart Eliquis, renal dosing  Continue metoprolol    Hypertension  Continue metoprolol    Hyperlipidemia  Continue statin    Hyperthyroidism  Continue methimazole    GERD  Continue omeprazole    Anemia  Likely of chronic renal disease  Continue Retacrit    Leukocytosis  Recently treated with IV antibiotics for pneumonia  Check morning labs    Elevated procalcitonin  Recently treated with IV antibiotics for pneumonia, unclear etiology    I performed a complete drug regimen review and did not identify any potential clinically significant medication issues.    The patient's CODE STATUS was changed from full code to DNR/DNI after extensive discussion with the patient.    REHABILITATION ISSUES/ADVERSE POTENTIAL:  1.  BKA: Patient demonstrates functional deficits in strength, balance, coordination, and ADL's. Patient is admitted to [facility] for comprehensive rehabilitation therapy as described below.   Rehabilitation nursing monitors bowel and bladder control, educates on medication administration, co-morbidities and monitors patient safety.    2.  DVT prophylaxis:  Patient is on nothing for anticoagulation upon transfer. Will restart Eliquis. Encourage OOB. Monitor daily for signs and symptoms of DVT including but not limited to swelling and pain to prevent the development of DVT that may interfere with therapies.    3.  Pain: No issues with  pain currently / Controlled with as needed oral analgesics.    4.  Nutrition/Dysphagia: Dietician monitors nutrient intake, recommend supplements prn and provide nutrition education to pt/family to promote optimal nutrition for wound healing/recovery.     5.  Bladder/bowel:  Start bowel and bladder program, to prevent constipation, urinary retention (which may lead to UTI), and urinary incontinence (which will impact upon pt's functional independence).   - TV Q3h while awake with post void bladder scans, I&O cath for PVRs >400  - up to commode after meal     6.  Skin/dermal ulcer prophylaxis: Monitor for new skin conditions with q.2 h. turns as required to prevent the development of skin breakdown.     7.  Cognition/Behavior:  Psychologist Dr. Izaguirre provides adjustment counseling to illness and psychosocial barriers that may be potential barriers to rehabilitation.     8. Respiratory therapy: RT performs O2 management prn, breathing retraining, pulmonary hygiene and bronchospasm management prn to optimize participation in therapies.    Pt was seen today for 74 min, and entire time spent in face-to-face contact was >50% in counseling and coordination of care as detailed in A/P above.        GOALS/EXPECTED LEVEL OF FUNCTION BASED ON CURRENT MEDICAL AND FUNCTIONAL STATUS (may change based on patient's medical status and rate of impairment recovery):  Transfers:   Supervision  Mobility/Gait:   Supervision  ADL's:   Minimal Assistance  Cognition:  Least verbal cues/assistance    DISPOSITION: Discharge to pre-morbid independent living setting with the supportive care of patient's family.      ELOS: 14 days    Lata Goodman M.D.  Physical Medicine and Rehabilitation

## 2021-01-13 NOTE — DISCHARGE INSTRUCTIONS
Discharge Instructions    Discharged to other by medical transportation with escort. Discharged via wheelchair, hospital escort: Yes.  Special equipment needed: Not Applicable    Be sure to schedule a follow-up appointment with your primary care doctor or any specialists as instructed.     Discharge Plan:   Diet Plan: Discussed  Activity Level: Discussed  Confirmed Follow up Appointment: Patient to Call and Schedule Appointment  Confirmed Symptoms Management: Discussed  Medication Reconciliation Updated: Yes  Influenza Vaccine Indication: Not indicated: Previously immunized this influenza season and > 8 years of age    I understand that a diet low in cholesterol, fat, and sodium is recommended for good health. Unless I have been given specific instructions below for another diet, I accept this instruction as my diet prescription.   Other diet: Renal Diabetic Cardiac diet as tolerated    Special Instructions:   Monitor for signs and symptoms of infection (fever, chills, nausea, vomiting)  Monitor incisions for swelling, redness, or drainage  You may shower, no baths or soaks until incisions are healed      Leg Amputation, Care After  This sheet gives you information about how to care for yourself after your procedure. Your health care provider may also give you more specific instructions. If you have problems or questions, contact your health care provider.  What can I expect after the procedure?  After the procedure, it is common to have:  · A little blood or fluid coming from your incision.  · Pain from your incision.  · Pain that feels like it is coming from the leg that has been removed (phantom pain). This can last for a year or longer.  · Skin breakdown on your stump (residual limb).  · Feelings of depression, anxiety, and fear.  Follow these instructions at home:  Medicines  · Take over-the-counter and prescription medicines only as told by your health care provider.  · If you were prescribed an antibiotic  medicine, take it as told by your health care provider. Do not stop taking the antibiotic even if you start to feel better.  Bathing  · Do not take baths, swim, use a hot tub, or get your residual limb wet until your health care provider approves. You may only be allowed to take sponge baths.  · Ask your health care provider when you may start taking showers. After taking a shower, make sure to rinse and dry your residual limb carefully.  Incision care    · Check your residual limb, especially your incision area, every day. Check for:  ? More redness, swelling, or pain.  ? More fluid or blood.  ? Warmth.  ? Pus or a bad smell.  ? Blisters.  ? Scrapes.  · Follow instructions from your health care provider about how to take care of your incision. Make sure you:  ? Wash your hands with soap and water before you change your bandage (dressing). If soap and water are not available, use hand .  ? Change your dressing as told by your health care provider.  ? Leave stitches (sutures), skin glue, or adhesive strips in place. These skin closures may need to stay in place for 2 weeks or longer. If adhesive strip edges start to loosen and curl up, you may trim the loose edges. Do not remove adhesive strips completely unless your health care provider tells you to do that.  Activity  · Return to your normal activities as told by your health care provider. Ask your health care provider what activities are safe for you.  · Do physical therapy exercises as told by your health care provider.  · If you have been fitted with an artificial leg (prosthesis) or have been given crutches, use them as told by your health care provider.  Eating and drinking  · Eat a healthy diet that includes whole grains, fruits and vegetables, low-fat dairy products, and lean proteins.  · Drink enough fluid to keep your urine pale yellow.  Driving  · Work with an occupational therapist to learn new strategies for safe driving with an  amputation.  · Do not drive or use heavy equipment while taking prescription pain medicine.  General instructions  · To prevent or treat constipation while you are taking prescription pain medicine, your health care provider may recommend that you:  ? Drink enough fluid to keep your urine pale yellow.  ? Take over-the-counter or prescription medicines.  ? Eat foods that are high in fiber, such as fresh fruits and vegetables, whole grains, and beans.  ? Limit foods that are high in fat and processed sugars, such as fried and sweet foods.  · Do not use oils, lotion, cream, or rubbing alcohol on the remaining part of your leg.  · Wear compression stockings as told by your health care provider.  · If you have trouble coping with your amputation, contact your health care provider. Some feelings of depression, anxiety, or fear are normal after an amputation, but if you struggle with these feelings or if they get overwhelming, your provider may be able to recommend a therapist or support group to help you.  · Do not use any products that contain nicotine or tobacco, such as cigarettes and e-cigarettes. These can delay bone healing. If you need help quitting, ask your health care provider.  · Keep all follow-up visits as told by your health care provider. This is important.  Contact a health care provider if:  · You have a fever.  · You have more tenderness in your residual limb.  · You have a rash or itchy skin.  · You have a cough or chills and you feel achy and weak.  · You have trouble coping with your amputation.  · You have blisters or scrapes on your residual limb.  Get help right away if:  · You have severe pain in your residual limb.  · You have more redness, swelling, or pain around your incision.  · You have more fluid or blood coming from your incision.  · Your incision feels warm to the touch, tender, and painful.  · You have pus or a bad smell coming from your incision.  · You feel light-headed and have  shortness of breath.  · You have blood-soaked bandages.  · You cough up blood.  · You have chest pain or pain when taking a deep breath or coughing. If you have these symptoms, do not drive yourself to the hospital. Call emergency services right away.  If you ever feel like you may hurt yourself or others, or have thoughts about taking your own life, get help right away. You can go to your nearest emergency department or call:  · Your local emergency services (911 in the [place]  · A suicide crisis helpline, such as the National Suicide Prevention Lifeline at 1-522.150.5733. This is open 24 hours a day.  Summary  · After a leg amputation, you may have pain that feels like it is coming from the leg that was removed (phantom pain). This can last for a year or longer.  · Follow instructions from your health care provider about how to take care of your incision.  · Check your residual limb, especially your incision area, every day. More redness, swelling, or pain may be a sign of infection.  · Contact your health care provider if you have trouble coping with your amputation.  This information is not intended to replace advice given to you by your health care provider. Make sure you discuss any questions you have with your health care provider.  Document Released: 03/28/2018 Document Revised: 03/28/2018 Document Reviewed: 03/28/2018  [employer] Patient Education © 2020 [employer]        Depression / Suicide Risk    As you are discharged from this [facility] facility, it is important to learn how to keep safe from harming yourself.    Recognize the warning signs:  · Abrupt changes in personality, positive or negative- including increase in energy   · Giving away possessions  · Change in eating patterns- significant weight changes-  positive or negative  · Change in sleeping patterns- unable to sleep or sleeping all the time   · Unwillingness or inability to communicate  · Depression  · Unusual sadness, discouragement  and loneliness  · Talk of wanting to die  · Neglect of personal appearance   · Rebelliousness- reckless behavior  · Withdrawal from people/activities they love  · Confusion- inability to concentrate     If you or a loved one observes any of these behaviors or has concerns about self-harm, here's what you can do:  · Talk about it- your feelings and reasons for harming yourself  · Remove any means that you might use to hurt yourself (examples: pills, rope, extension cords, firearm)  · Get professional help from the community (Mental Health, Substance Abuse, psychological counseling)  · Do not be alone:Call your Safe Contact- someone whom you trust who will be there for you.  · Call your local CRISIS HOTLINE 157-8293 or 319-235-1434  · Call your local Children's Mobile Crisis Response Team [place] (181) 435-5303 or www.[employer]  · Call the toll free National Suicide Prevention Hotlines   · National Suicide Prevention Lifeline 454-039-RTGR (2969)  · National Hope Line Network 800-SUICIDE (158-4726)

## 2021-01-13 NOTE — PROGRESS NOTES
Discharging Patient to Rehab per physician order.  Discharged with transport.  Demonstrated understanding of discharge instructions, follow up appointments, home medications, home care for surgical wound and nursing care instructions.  Ambulating with 1 assist and FWW- tires easy, oliguric but voids without difficulty, pain well controlled, tolerating oral medications, oxygen saturation greater than 90%, tolerating diet.  Educational handouts given and discussed.  Verbalized understanding of discharge instructions and educational handouts.  All questions answered.  Belongings with patient at time of discharge.    Report given to Luz Elena MONTOYA

## 2021-01-13 NOTE — DISCHARGE SUMMARY
Discharge Summary    CHIEF COMPLAINT ON ADMISSION  No chief complaint on file.      Reason for Admission  PAIN OF TOE OF RIGHT FOOT     CODE STATUS  Full Code    HPI & HOSPITAL COURSE  75-year-old male past medical history diabetes, ESRD on hemodialysis, PAD, CAD s/p LAD stent, hypothyroidism, A. fib on Eliquis was admitted for planned right BKA which was performed 12/31/2020.  Post surgery patient developed pneumonia.  He was started on IV Unasyn 1/6/2021 with acute respiratory failure requiring oxygen. Later on antibiotic were changed to IV Zosyn due to persistent leukocytosis. He is not requiring anymore oxygen but mild leukocytosis with no obvious source of infection is still present. Pt will need to follow up with primary care and possible future hematology referral in the future. He also required blood transfusion 01/4. Eliquis was held and resumed on discharge. Please monitor for any signs of acute bleeding.  Also during hospital stay he developed encephalopathy secondary to multifactorial causes which is continuing to improve.     Therefore, he is discharged in good and stable condition to an inpatient rehabilitation hospital.    The patient met 2-midnight criteria for an inpatient stay at the time of discharge.      FOLLOW UP ITEMS POST DISCHARGE  Follow up with primary care within 2 weeks     DISCHARGE DIAGNOSES  Principal Problem (Resolved):    Diabetic foot ulcer (HCC) POA: Yes  Active Problems:    Leukocytosis POA: No    Hyperthyroidism POA: Yes    CAD (coronary artery disease) POA: Yes      Overview: Stents to proximal and mid LAD in [facility].    Essential hypertension POA: Yes    Anemia POA: Yes    Type 2 diabetes mellitus with kidney complication, with long-term current use of insulin (HCC) POA: Yes    End stage renal disease on dialysis (HCC) POA: Yes    Paroxysmal A-fib (HCC) POA: Yes    Peripheral vascular disease (HCC) POA: Yes    Frailty syndrome in geriatric patient POA: Yes     Dyslipidemia POA: Yes  Resolved Problems:    Pneumonia POA: No    Encephalopathy acute POA: No    Hyponatremia POA: Yes    Dysphagia POA: Yes    Acute respiratory failure with hypoxia (HCC) POA: Yes    Urinary retention POA: Yes    Pyuria POA: No      FOLLOW UP  Future Appointments   Date Time Provider Department Center   7/30/2021 10:00 AM Kulwant Osborn M.D. [facility] None     [facility]  [address] [facility] [id number] [phone]        [facility]  [address] [facility] [phone]        Leobardo Lynn M.D.  [address] [facility] [address] [phone]    In 1 week  For suture removal.  Suture removal 3 weeks postop      MEDICATIONS ON DISCHARGE     Medication List      START taking these medications      Instructions   senna-docusate 8.6-50 MG Tabs  Commonly known as: PERICOLACE or SENOKOT S   Take 2 Tabs by mouth 2 Times a Day.  Dose: 2 Tab        CONTINUE taking these medications      Instructions   acetaminophen 325 MG Tabs  Commonly known as: Tylenol   Take 650 mg by mouth every four hours as needed.  Dose: 650 mg     apixaban 2.5mg Tabs  Commonly known as: Eliquis   Take 1 Tab by mouth 2 Times a Day.  Dose: 2.5 mg     atorvastatin 40 MG Tabs  Commonly known as: LIPITOR   Take 1 Tab by mouth every bedtime.  Dose: 40 mg     calcitRIOL 0.25 MCG Caps  Commonly known as: ROCALTROL   Take 0.25 mcg by mouth every day.  Dose: 0.25 mcg     calcium acetate 667 MG Caps  Commonly known as: PHOS-LO   Take 1,334 mg by mouth 3 times a day, with meals.  Dose: 1,334 mg     carvedilol 3.125 MG Tabs  Commonly known as: COREG   Take 3.125 mg by mouth 2 times a day, with meals.  Dose: 3.125 mg     guaiFENesin  MG Tb12  Commonly known as: Mucinex   Take 1 Tab by mouth every 12 hours.  Dose: 600 mg     Lantus SoloStar 100 UNIT/ML Sopn injection  Generic drug: insulin glargine   Inject 5 Units as instructed every bedtime.  Dose: 5 Units    "  methimazole 5 MG Tabs  Commonly known as: TAPAZOLE   TAKE 1.5 TABLETS BY MOUTH  IN THE MORNING AND 1 TABLET IN THE  EVENING     ondansetron 4 MG Tbdp  Commonly known as: ZOFRAN ODT      Protonix 40 MG Tbec  Generic drug: pantoprazole   Take 40 mg by mouth every bedtime.  Dose: 40 mg        STOP taking these medications    ibuprofen 200 MG Tabs  Commonly known as: MOTRIN            Allergies  Allergies   Allergen Reactions   • Mircera [Methoxy Polyethylene Glycol-Epoetin Beta] Hives   • Other Drug      \"Opiods\" caused  hallucinations       DIET  Orders Placed This Encounter   Procedures   • Diet Order Diet: Renal; Second Modifier: (optional): Consistent CHO (Diabetic)     Standing Status:   Standing     Number of Occurrences:   1     Order Specific Question:   Diet:     Answer:   Renal [8]     Order Specific Question:   Second Modifier: (optional)     Answer:   Consistent CHO (Diabetic) [4]       ACTIVITY  As tolerated.  Weight bearing as tolerated    LINES, DRAINS, AND WOUNDS  This is an automated list. Peripheral IVs will be removed prior to discharge.  Peripheral IV 01/02/21 20 G Anterior;Right Forearm (Active)   Site Assessment Clean;Intact;Dry 01/12/21 2300   Dressing Type Transparent Film;Occlusive 01/12/21 2300   Line Status Infusing 01/12/21 2300   Dressing Status Clean;Dry;Intact 01/12/21 2300   Dressing Intervention N/A 01/12/21 2300   Dressing Change Due 01/07/21 01/02/21 2050   Date Primary Tubing Changed 01/10/21 01/12/21 1200   Date Secondary Tubing Changed 01/11/21 01/12/21 1200   NEXT Primary Tubing Change  01/15/21 01/12/21 2300   NEXT Secondary Tubing Change  01/13/21 01/12/21 2300   Infiltration Grading ([facility], [facility]) 0 01/12/21 2300   Phlebitis Scale ([facility] Only) 0 01/12/21 2300       Wound 12/08/20 Other (comment) MTH 1 Plantar Right --Right Plantar 1st MTH (Active)       Wound 12/31/20 Knee Lower Right (Active)   Wound Image   01/07/21 2030   Site Assessment EMELIA 01/12/21 2100   Periwound " Assessment EMELIA 01/12/21 2100   Margins EMELIA 01/12/21 2100   Closure EMELIA 01/12/21 2100   Drainage Amount EMELIA 01/12/21 2100   Drainage Description EMELIA 01/12/21 2100   Dressing Options Ace Wrap;Absorbent Abdominal Pad 01/12/21 2100   Dressing Changed Observed 01/12/21 0750   Dressing Status Clean;Dry;Intact 01/12/21 2100   Dressing Change/Treatment Frequency Every 48 hrs, and As Needed 01/12/21 2100   NEXT Dressing Change/Treatment Date 01/13/21 01/12/21 2100       Wound 01/06/21 Buttocks;Coccyx incontinence (Active)   Wound Image   01/07/21 1409   Site Assessment Red;Excoriated;Painful 01/12/21 2100   Periwound Assessment Red;Fragile 01/12/21 2100   Margins Undefined edges 01/12/21 2100   Closure None 01/12/21 2100   Drainage Amount None 01/12/21 2100   Drainage Description Serous 01/12/21 2100   Treatments Site care 01/12/21 0750   Wound Cleansing Foam Cleanser/Washcloth 01/12/21 2100   Periwound Protectant Barrier Paste 01/12/21 2100   Dressing Options Open to Air 01/12/21 2100   Dressing Changed Observed 01/12/21 0750   Dressing Status Open to Air 01/12/21 2100       Peripheral IV 01/02/21 20 G Anterior;Right Forearm (Active)   Site Assessment Clean;Intact;Dry 01/12/21 2300   Dressing Type Transparent Film;Occlusive 01/12/21 2300   Line Status Infusing 01/12/21 2300   Dressing Status Clean;Dry;Intact 01/12/21 2300   Dressing Intervention N/A 01/12/21 2300   Dressing Change Due 01/07/21 01/02/21 2050   Date Primary Tubing Changed 01/10/21 01/12/21 1200   Date Secondary Tubing Changed 01/11/21 01/12/21 1200   NEXT Primary Tubing Change  01/15/21 01/12/21 2300   NEXT Secondary Tubing Change  01/13/21 01/12/21 2300   Infiltration Grading ([facility], [facility]) 0 01/12/21 2300   Phlebitis Scale ([facility] Only) 0 01/12/21 2300       Hemodialysis AV Graft/Fistula 04/21/20 Left AV fistula Upper arm (Active)   AV Fistula Status Bruit present;Thrill present 01/12/21 1300   Site Assessment Clean;Dry;Intact 01/12/21 1300   Status  Flushed;Deaccessed 01/12/21 1300   Local Anesthetic None 01/12/21 0910   Site Prep Alcohol 01/12/21 0910   Needle Size 15 G 01/12/21 0910   Dressing Type Gauze 01/12/21 1300   Dressing Status Clean;Dry;Intact 01/12/21 1300   Dressing Intervention Dressing reinforced 01/12/21 1300               MENTAL STATUS ON TRANSFER  Level of Consciousness: Alert  Orientation : Oriented x 4  Speech: Speech Clear    CONSULTATIONS  Nephrology  Palliative  Orthopedic  LPS    PROCEDURES  BKA     LABORATORY  Lab Results   Component Value Date    SODIUM 135 01/13/2021    POTASSIUM 3.3 (L) 01/13/2021    CHLORIDE 98 01/13/2021    CO2 26 01/13/2021    GLUCOSE 126 (H) 01/13/2021    BUN 21 01/13/2021    CREATININE 4.96 (H) 01/13/2021        Lab Results   Component Value Date    WBC 11.8 (H) 01/13/2021    HEMOGLOBIN 9.2 (L) 01/13/2021    HEMATOCRIT 28.7 (L) 01/13/2021    PLATELETCT 324 01/13/2021        Total time of the discharge process exceeds 45 minutes.

## 2021-01-13 NOTE — DISCHARGE PLANNING
Outpatient Dialysis Note    Confirmed patient is active at:    [facility]   [address]  [place] [address]    Schedule: Tuesday, Thursday, Saturday   Time: 09:45am     Patient is seen by Dr. Hunter in HD clinic.    Spoke with Clayton at facility who confirmed.    Forwarded records for review.    Becca Bojorquez- Dialysis Coordinator  [facility] # [phone]

## 2021-01-13 NOTE — PREADMISSION SCREENING NOTE
"  Pre-Admission Screening Form    Patient Information:   Name: Luis Sepulveda     MRN: [id number]       : 1945      Age: 75 y.o.   Gender: male      Race: White [7]       Marital Status:  [4]  Family Contact: Miguel Sepulveda Mary        Relationship: Son [15]  Relative [11]  Home Phone:   [phone]           Cell Phone: [phone]  Advanced Directives: None  Code Status:  FULL  Current Attending Provider: Radha Bowman M.D.  Referring Physician: Dr. Esteves   Physiatrist Consult: Dr. Gr    Referral Date: 21  Primary Payor Source:  MEDICARE  Secondary Payor Source:  [facility]    Medical Information:   Date of Admission to Acute Care [place]2020  Room Number: T417/00  Rehabilitation Diagnosis: 0005.4 - Amputation: Unilateral Lower Limb Below the Knee (BK)  Immunization History   Administered Date(s) Administered   • Hepatitis B Vaccine (Adol/Adult) 2019, 2020, 2020, 2020   • Influenza Vaccine Adult HD 2019, 2020   • Influenza Vaccine Quad Inj (Pf) 2019   • Pneumococcal Conjugate Vaccine (Prevnar/PCV-13) 2019   • Pneumococcal polysaccharide vaccine (PPSV-23) 2019, 2020     Allergies   Allergen Reactions   • Mircera [Methoxy Polyethylene Glycol-Epoetin Beta] Hives   • Other Drug      \"Opiods\" caused  hallucinations     Past Medical History:   Diagnosis Date   • Arrhythmia     hx a fib   • Arthritis 2020    knees, ankles, back   • CAD (coronary artery disease)     stents   • Chronic anticoagulation 3/26/2020   • Chronic kidney disease (CKD), stage V (HCC)    • Congestive heart failure (HCC)     hx of   • Dental disorder 2020    partial upper   • Diabetes    • Hemodialysis patient (HCC)     Tuesday, Thursday, Saturday   • Hypertension    • Kidney failure    • Myocardial infarct (HCC)     ? 2019    • Osteoarthritis    • Peripheral neuropathy    • Pneumonia     per hx   • Sleep apnea "     does not use cpap anymore     Past Surgical History:   Procedure Laterality Date   • KNEE AMPUTATION BELOW Right 12/31/2020    Procedure: AMPUTATION, BELOW KNEE ...;  Surgeon: Leobardo Lynn M.D.;  Location: SURGERY [facility];  Service: Orthopedics   • TOE AMPUTATION Right 11/16/2020    Procedure: AMPUTATION, TOE GREAT;  Surgeon: Leobardo Lynn M.D.;  Location: SURGERY [facility];  Service: Orthopedics   • TOE AMPUTATION Left 5/17/2020    Procedure: AMPUTATION, TOE GREAT;  Surgeon: Leobardo Lynn M.D.;  Location: [facility];  Service: Orthopedics   • TENOTOMY Left 5/17/2020    Procedure: FLEXOR TENOTOMIES, TWO-FIVE TOES;  Surgeon: Leobardo Lynn M.D.;  Location: SURGERY [facility];  Service: Orthopedics   • APPENDECTOMY     • OTHER CARDIAC SURGERY      stents x1   • OTHER ORTHOPEDIC SURGERY      knee surgery       History Leading to Admission, Conditions that Caused the Need for Rehab (CMS):     Dr. Luna H&P:  Chief Complaint  No chief complaint on file.     History of Presenting Illness  75 y.o. male with DM2, ESRD on HD, PAD, CAD s/p LAD stent, Hyperthyroidism, pAfib on Eliquis, and HLD who presented 12/31/2020 here for a planned right BKA. He was discharged on 11/19 after a right great toe ray amputation on 11/16 for a nonhealing wound. He was being followed up outpatient and when his amputation site wasn't healing secondary to PAD, it was decided that the best course of action would be a BKA.      Today, he is with his wife in pre-op and in no acute distress. He did have his HD session this morning. Endorses some dizziness but thinks it's 2/2 not eating today and having HD. Gentle IVF currently running while he is in pre-op.      Assessment/Plan:  I anticipate this patient will require at least two midnights for appropriate medical management, necessitating inpatient admission.     * Wound of right foot- (present on admission)  Assessment & Plan  Nonhealing incision s/p right  great toe ray amputation in Nov 2020.   - planned BKA today  - Orthopedic surgery following, appreciate recs  - pain control  - post operative ancef  - PT/OT to evaluate     Peripheral vascular disease (HCC)- (present on admission)  Assessment & Plan  Complicating wound healing. Continue to reduce risk factors.  - s/p angiogram and thrombectomy in April 2020  - holding eliquis for BKA today     End stage renal disease on dialysis (HCC)- (present on admission)  Assessment & Plan  On HD- TTS  - consult [facility] tomorrow for maintenance HD  - renal/DM diet  - continue phos binder  - EPO per nephro     Type 2 diabetes mellitus (HCC)- (present on admission)  Assessment & Plan  With hyperglycemia, A1C 7.8%.   - lantus 5 U nightly  - DM/renal diet and hypoglycemia protocol  - sensitive sliding scale q6 hrs as he's currently NPO for the surgery     Paroxysmal A-fib (HCC)- (present on admission)  Assessment & Plan  Rate controlled  - resume home coreg tonight, with holding parameters  - resume home eliquis when ok'd by surgery     Dyslipidemia- (present on admission)  Assessment & Plan  Reports that he hasn't been taking his atorvastatin. Lipid panel from 11/14 showed elevated TAGs and LDL at 85  - resume atorvastatin     Essential hypertension- (present on admission)  Assessment & Plan  Normotensive.   - resume home coreg with holding parameters     Hyperthyroidism- (present on admission)  Assessment & Plan  - resume home methimazole                 Dr. Gr (Physiatry) recommendations:  ASSESSMENT:  Patient is a 75 y.o. male admitted for right lower extremity osteomyelitis s/p right BKA on 12/31/20 with Dr. Leobardo Lynn. Hospital course with ESRD on HD, hyponatremia, anemia, drain management, and monitoring for appropriate time to resume anticoagulation in setting of PAF, CAD s/p stent, anemia, and recent surgery. Recent history of right great toe amputation on 11/16/20 for a nonhealing wound.          #Rehab:   -Vitals: stable, RA  -Insurance: Medicare/[employer]  -Discharge support: ex wife/son  -Medical complexity: BKA, DM, ESRD on HD, PAD, CAD s/p stent, PAF on AC; anemia with Hgb 7 on 1/4 requiring lab checks  -Rehab Impairment Code: 0005.4 - Amputation: Unilateral Lower Limb Below the Knee (BK)  -Reason for admission: Wound of right foot  -When medically cleared, meets criteria for IRF         #Ortho: right lower extremity osteomyelitis s/p right BKA on 12/31/20 with Dr. Leobardo Lynn  -NW, knee immobilizer  -eliquis when anemia improves  -drain in right knee     #ID: right foot wound, non healing, history of right great toe amputation on 11/16/20     #CV: HTN, PAD, CAD s/p stent, PAF on AC at home  -atorvastatin, metoprolol     #Anemia: Hgb 7 on 1/4/21; of note, has CKD  -monitor     #Supp: calcitriol      #DM: SSI, lantus     #Renal: ESRD on HD (Tu/Thu/Sa); Tunneled dialysis cath removed 1/4; to use AV fistula for HD     #Pain: PRN IV morphine; MS contin      #GI: omeprazole     #Hyponatremia: Na 128 on 1/4 <= 132 on 1/3  -monitor     #Hyperthyroidism: methimazole     #Bowel: last BM on 1/2     #Bladder: voiding      #DVT PPX:   -SCDs  -home apixaban on hold due to Hgb 7 on 1/4/21    Dr. Mendoza (Nephrology) recommendations:  IMPRESSION:  1.  End-stage renal disease, on chronic hemodialysis Tuesday, Thursday, and   Saturday.  2.  Dyspnea.  I suspect the patient may have some element of fluid overload.    I am not sure how much fluid  he got after surgery.  3.  Status post right below-the-knee amputation on 12/31/2020.  4.  Hypertension, controlled.  5.  Anemia of chronic kidney disease.  6.  Diabetes mellitus.  7.  Chronic kidney disease - Metabolic bone disorder.  Managed at the dialysis   unit.  8.  Peripheral vascular disease.  9.  Paroxysmal atrial fibrillation, on Eliquis.  10.  Coronary artery disease, status post stents.  Asymptomatic.  11.  History of hyperthyroidism.  12.   Dyslipidemia, on statins.  13.  Past history of heavy tobacco use.     PLAN:  1.  We are going to proceed with a short dialysis today to remove some fluid   and see if that helps his respiratory status.  2.  We will continue his chronic dialysis otherwise on Tuesday, Thursday and   Saturday.  We will dialyze him tomorrow again.  He dialyzes for 3-1/2 hours.  3.  We are going to ultrafiltrate as tolerated.  4.  We are going to check basic metabolic panel today prior to dialysis.  5.  He needs to have blood rechecked tomorrow.  6.  Continue home medicines.  7.  No dietary protein restrictions.  8.  Adjust all of his medicines for a GFR less than 10.    Leobardo Lynn M.D.   Physician   Surgery Orthopedic   OP Report   Signed   Date of Service:  12/31/2020  4:54 PM                    []Hide copied text    []Hover for details  DATE OF OPERATION: 12/31/2020     PREOPERATIVE DIAGNOSIS: right lower extremity osteomyelitis     POSTOPERATIVE DIAGNOSIS: Same     PROCEDURE PERFORMED: right below knee amputation     SURGEON: Leobardo Lynn M.D.     ANESTHESIOLOGIST: Souleymane Salinas MD.      ANESTHESIA: General  INDICATIONS: The patient is a 75 y.o.-year-old male who presents with a dysvascular lower extremity with gangrene.  Given their radiographic and exam findings, the patient is an appropriately indicated candidate for right below knee amputation.  I discussed the risks, benefits, and alternatives of surgery with the patient.  Risks discussed included failure to clear the infection, wound healing complication, need for additional surgery including more proximal amputation, neurovascular injury, blood loss, phantom limb sensation or pain, DVT/PE, and the medical risks of anesthesia (including stroke, MI, and death).  Benefits discussed included improved chance of clearance of the infection and improved function.  The patient is in agreement with the plan to proceed, and their informed consent was signed and documented in the  medical record.  I met with the patient pre-operatively and marked the operative extremity with their agreement.  He was taken to the operating room.     FINDINGS: Non-viable lower extremity     SPECIMEN: None     ESTIMATED BLOOD LOSS: Minimal     COMPLICATIONS: None     PROCEDURE: The patient underwent general anesthesia, and was positioned supine with all bony prominences well padded.  A well padded non-sterile tourniquet was applied the the operative extremity, and the operative extremity was prepped and draped in sterile orthopaedic fashion, using ChloraPrep.  The surgical team scrubbed in, and a procedural pause was conducted to verify correct patient, correct extremity, presence of the surgeons initials on the operative extremity, and administration of IV antibiotics, in this case, Ancef.     Following generalized agreement, the tourniquet was inflated, and a standard below knee amputation flap was drawn.  The incision was then made, and we dissected through fascia sharply.  The anterior and lateral compartment musculature was then divided with cautery, and the anterior vascular bundle was ligated.  A traction neurolysis was performed on the nerve.     We then elevated the periosteum of the tibia proximally, and performed a transverse osteotomy of the tibia using the saw.  A napkin ring osteotomy of the fibula was performed with the saw, making our proximal cut just proximal to the tibial cut.  The amputation was then completed, and the posterior flap contoured using the amputation knife.  The residual limb was passed off.  The anterior aspect of the tibia was beveled using the saw.     We then dissected out the posterior neurovascular bundle, and ligated the vessels.  A traction neurolysis was performed on the nerve, as well as the sural nerve.  The posterior flap was debulked as needed, and the tourniquet deflated.  Hemostasis was obtained with cautery.  The wound was then irrigated with copious amounts of  normal saline.  The wound was then closed over a hemovac drain, with #1 Vicryl in the fascia, a few 2-0 Vicryl in the subcutaneous tissue, and 2-0 Nylon in the skin.  Negative pressure incisional dressing was placed with positive seal.  Sterile dressings and a knee immobilizer were applied, and the patient was transported to the recovery room in stable condition, suffering no complications.  All counts were correct.     The use of Dr. Sukh Aguero MD, as a surgical assistant was necessary for assistance with amputation and closure.     Post-Operative Plan:     1.  NWB operative extremity, knee immobilizer when at rest  2.  DVT prophylaxis - SCD's, chemical per medicine  3.  Remove drain when output < 30 mL/24 hours  4.  Antibiotics - Per ID recs  5.  Discharge planning     ____________________________________   Leobardo Lynn M.D.     DD: 12/31/2020  4:54 PM           Radha Bowman M.D.   Physician   [facility]   Progress Notes   Signed   Date of Service:  1/12/2021  9:46 AM                    []Hide copied text    []Yonver for details  [facility] Daily Progress Note     Date of Service  1/12/2021     Chief Complaint                      75 y.o.             male admitted 12/31/2020 with diabetic right foot ulcer     Hospital Course  75-year-old male past medical history diabetes, ESRD on hemodialysis, PAD, CAD s/p LAD stent, hypothyroidism, A. fib on Eliquis was admitted for planned right BKA which was performed 12/31/2020.  Post surgery patient developed pneumonia.  He was started on IV Unasyn 1/6/2021 with acute respiratory failure requiring oxygen. He also required blood transfusion 01/4.  Later on antibiotic were changed to IV Zosyn due to persistent leukocytosis.  Also during hospital stay he developed encephalopathy secondary to multifactorial causes which is continued to improve.      Interval Problem Update  Patient did have few beats of V. tach asymptomatic.  Leukocytosis still persistent. No  complains otherwise      Consultants/Specialty  Nephrology  Palliative  Orthopedic  LPS     Code Status  Full Code     Disposition  Inpatient/rehab when accepted     Review of Systems  Review of Systems   Constitutional: Negative for chills and fever.   Respiratory: Negative for cough and shortness of breath.    Cardiovascular: Negative for chest pain and palpitations.   Genitourinary: Negative for dysuria.   Musculoskeletal: Negative for back pain.   Skin: Negative for rash.   Neurological: Negative for dizziness and headaches.         Physical Exam  Temp:  [36.4 °C (97.6 °F)-37.2 °C (98.9 °F)] 36.6 °C (97.9 °F)  Pulse:  [71-82] 71  Resp:  [16-18] 16  BP: (108-153)/(49-73) 147/67  SpO2:  [95 %-99 %] 99 %     Physical Exam  Vitals signs and nursing note reviewed.   Constitutional:       General: He is not in acute distress.     Appearance: Normal appearance. He is obese. He is not ill-appearing.   HENT:      Head: Normocephalic and atraumatic.      Nose: No congestion.   Eyes:      General: No scleral icterus.  Cardiovascular:      Rate and Rhythm: Rhythm irregular.      Heart sounds: No murmur.   Pulmonary:      Effort: No respiratory distress.      Breath sounds: No wheezing or rales.   Abdominal:      General: There is distension.      Palpations: Abdomen is soft.      Tenderness: There is no abdominal tenderness. There is no guarding.   Neurological:      General: No focal deficit present.      Mental Status: He is alert.          Fluids     Intake/Output Summary (Last 24 hours) at 1/12/2021 1007  Last data filed at 1/12/2021 0745      Gross per 24 hour   Intake 340 ml   Output 300 ml   Net 40 ml         Laboratory       Recent Labs     01/10/21  0817 01/11/21  1023   WBC 16.6* 18.2*   RBC 2.72* 3.11*   HEMOGLOBIN 8.5* 9.7*   HEMATOCRIT 27.5* 31.0*   .1* 99.7*   MCH 31.3 31.2   MCHC 30.9* 31.3*   RDW 60.4* 56.4*   PLATELETCT 354 391   MPV 8.8* 8.9*           Recent Labs     01/10/21  0817 01/11/21  1023    SODIUM 140 136   POTASSIUM 4.2 3.8   CHLORIDE 100 98   CO2 24 23   GLUCOSE 150* 171*   BUN 25* 34*   CREATININE 5.36* 6.89*   CALCIUM 9.1 9.5                     Imaging  US-EXTREMITY VENOUS LOWER BILAT   Final Result       DX-CHEST-LIMITED (1 VIEW)   Final Result       1.  No acute cardiopulmonary disease.       2.  Stable interstitial opacity or fibrotic change in the left lower lobe.       DX-CHEST-LIMITED (1 VIEW)   Final Result       1.  Minimal left lower lobe interstitial edema or pneumonia.       2.  Clear right lung.       DX-CHEST-LIMITED (1 VIEW)   Final Result       Improving left basilar opacity, likely sequela of pneumonia. No new consolidation or pleural effusions.       CT-HEAD W/O   Final Result           1.  No acute intracranial abnormality.       DX-CHEST-LIMITED (1 VIEW)   Final Result       1.  Minimal interstitial opacity in the lateral aspect of the left lower lobe which represent minimal interstitial edema, pneumonia, and/or fibrotic change.       2.  The remainder of the lung parenchyma is clear.       3.  No pleural effusion.       IR-CVC TUNNEL W/O PORT REMOVAL   Final Result       Removal of the right internal jugular hemodialysis catheter.       US-HEMODIALYSIS GRAFT DUPLEX COMP UPPER EXTREMITY   Final Result             Assessment/Plan  * Diabetic foot ulcer (HCC)- (present on admission)  Assessment & Plan  Right below knee amputation. 12/31/2020  Pain control - scheduled Tylenol, Oxycodone prn  PT and OT     Leukocytosis  Assessment & Plan  blood cultures NGT . Afebrile   DVT studies negative   May need CT chest ??      Encephalopathy acute  Assessment & Plan  Avoid benzodiazepines and anticholinergics  Frequent orientation  Avoid early morning labs  Avoid vital signs during sleep  Ambulate if possible  STOPPED MS Contin and Xanax, decreased Oxycodone dose  1/12- almost at his baseline      Pneumonia  Assessment & Plan  Off oxygen. Last dose of zosyn today   Repeat CBC tomorrow       Urinary retention- (present on admission)  Assessment & Plan  Tomas placed     Frailty syndrome in geriatric patient- (present on admission)  Assessment & Plan  5 hospitalizations in 1 year  Palliative care     Peripheral vascular disease (HCC)- (present on admission)  Assessment & Plan  Hold Eliquis     Dysphagia- (present on admission)  Assessment & Plan  Renal Diabetoc diet, thin liquids  SLP to follow     Paroxysmal A-fib ([facility])- (present on admission)  Assessment & Plan  Metoprolol  Will discuss with pharmacy about restarting eliquis      End stage renal disease on dialysis ([facility])- (present on admission)  Assessment & Plan  HD per Nephrology     Type 2 diabetes mellitus with kidney complication, with long-term current use of insulin ([facility])- (present on admission)  Assessment & Plan  Monitor bmp     Anemia- (present on admission)  Assessment & Plan  Transfused 3 u PRBC  Follow cbc  Epogen     Essential hypertension- (present on admission)  Assessment & Plan  Metoprolol     CAD (coronary artery disease)- (present on admission)  Assessment & Plan  History of stents to proximal and mid LAD  Metoprolol, Atorvastatin     Hyperthyroidism- (present on admission)  Assessment & Plan  Methimazole  TSH wnl     Dyslipidemia- (present on admission)  Assessment & Plan  Atorvastatin        VTE prophylaxis: SCD               Co-morbidities: See PMH  Potential Risk - Complications: Contractures, Deep Vein Thrombosis, Incontinence, Malnutrition, Pain, Pneumonia, Pressure Ulcer and Urinary Tract Infection  Level of Risk: High    Ongoing Medical Management Needed (Medical/Nursing Needs):   Patient Active Problem List    Diagnosis Date Noted   • Leukocytosis 01/11/2021     Priority: High   • Polyarthritis 06/25/2018     Priority: High   • Frailty syndrome in geriatric patient 01/07/2021     Priority: Medium   • Peripheral vascular disease (HCC) 04/23/2020     Priority: Medium   • Paroxysmal A-fib ([facility]) 11/16/2019     Priority:  "Medium   • Type 2 diabetes mellitus with kidney complication, with long-term current use of insulin ([facility]) 08/13/2019     Priority: Medium   • End stage renal disease on dialysis ([facility]) 08/13/2019     Priority: Medium   • Essential hypertension 08/15/2018     Priority: Medium   • Anemia 08/15/2018     Priority: Medium   • Hyperthyroidism 06/25/2018     Priority: Medium   • CAD (coronary artery disease) 06/25/2018     Priority: Medium   • Secondary hyperparathyroidism of renal origin ([facility]) 11/15/2019     Priority: Low   • Dyslipidemia 04/11/2019     Priority: Low   • Gastroesophageal reflux disease 08/15/2018     Priority: Low   • Cardiomyopathy, ischemic 06/02/2020   • Anticoagulated 06/02/2020   • Left anterior fascicular block 06/02/2020   • Right bundle branch block 06/02/2020   • Diastolic dysfunction 03/26/2020   • Thyroid nodule 04/11/2019   • Hepatic cyst 08/02/2018   • Renal cyst 08/02/2018     A & O    Current Vital Signs:   Temperature: 36.9 °C (98.4 °F) Pulse: 79 Respiration: 16 Blood Pressure : 135/66  Weight: 71.9 kg (158 lb 8.2 oz) Height: 167.6 cm (5' 5.98\")  Pulse Oximetry: 100 % O2 (LPM): 0      Completed Laboratory Reports:  Recent Labs     01/10/21  0817 01/10/21  1751 01/10/21  1945 01/11/21  0735 01/11/21  1023 01/11/21  1209 01/11/21  1717 01/11/21 2012 01/12/21  0839 01/12/21  0930 01/12/21  1752 01/12/21 2022 01/13/21  0411   WBC 16.6*  --   --   --  18.2*  --   --   --   --  11.1*  --   --  11.8*   HEMOGLOBIN 8.5*  --   --   --  9.7*  --   --   --   --  8.8*  --   --  9.2*   HEMATOCRIT 27.5*  --   --   --  31.0*  --   --   --   --  27.1*  --   --  28.7*   PLATELETCT 354  --   --   --  391  --   --   --   --  356  --   --  324   SODIUM 140  --   --   --  136  --   --   --   --  130*  --   --  135   POTASSIUM 4.2  --   --   --  3.8  --   --   --   --  3.5*  --   --  3.3*   BUN 25*  --   --   --  34*  --   --   --   --  39*  --   --  21   CREATININE 5.36*  --   --   --  6.89*  --   --   --   -- "  7.36*  --   --  4.96*   ALBUMIN  --   --   --   --   --   --   --   --   --  2.9*  --   --  3.2   GLUCOSE 150*  --   --   --  171*  --   --   --   --  121*  --   --  126*   POCGLUCOSE  --  127* 141* 108*  --  167* 158* 151* 112*  --  175* 160*  --      Additional Labs: Not Applicable    Prior Living Situation:   Housing / Facility: 1 Story House  Steps Into Home: 0  Steps In Home: 0  Lives with - Patient's Self Care Capacity: Unable To Determine At This Time  Equipment Owned: Wheelchair    Prior Level of Function / Living Situation:   Physical Therapy: Prior Services: Unable To Determine At This Time  Housing / Facility: 1 Story House  Steps Into Home: 0  Steps In Home: 0  Elevator: No  Bathroom Set up: Walk In Shower(uses walk in shower in ex spouses room)  Equipment Owned: Wheelchair  Lives with - Patient's Self Care Capacity: Unable To Determine At This Time  Bed Mobility: Independent  Transfer Status: Independent  Distance Ambulation (Feet): (scooting around in w/c last 2 weeks)  Assistive Devices Used: Wheelchair  Wheelchair: Independent  Current Level of Function:   Gait Level Of Assist: Minimal Assist  Assistive Device: Front Wheel Walker  Distance (Feet): 3  Deviation: Step To, Bradykinetic  # of Stairs Climbed: 0  Weight Bearing Status: NWB R LE  Supine to Sit: Supervised  Sit to Supine: (NT, pt left seated in chair at end of session)  Scooting: Supervised(seated)  Rolling: (sit pivot)  Skilled Intervention: Verbal Cuing  Comments: HOB elevated, use of bed rails  Sit to Stand: Supervised  Bed, Chair, Wheelchair Transfer: Minimal Assist  Toilet Transfers: Minimal Assist  Transfer Method: Stand Step(hop)  Skilled Intervention: Verbal Cuing, Postural Facilitation, Compensatory Strategies  Sitting in Chair: 10 (toilet), left seated in chair  Sitting Edge of Bed: 5  Standin-7 x 2  Occupational Therapy:   Self Feeding: Independent  Grooming / Hygiene: Independent  Bathing: Independent  Dressing:  Independent  Toileting: Independent  Medication Management: Independent  Laundry: Independent  Kitchen Mobility: Independent  Finances: Independent  Home Management: Independent  Shopping: Independent  Prior Level Of Mobility: Uses Wheel Chair for in Home Mobility  Driving / Transportation: Relatives / Others Provide Transportation(ex wife drives)  Prior Services: Unable To Determine At This Time  Housing / Facility: [address] [place] [address] House  Occupation (Pre-Hospital Vocational): Retired Due To Age  Current Level of Function:   Eating: Minimal Assist(falling asleep and dropped fork x2)  Bathing: Minimal Assist(seated EOB)  Upper Body Dressing: Supervision  Lower Body Dressing: Moderate Assist  Toileting: Supervision  Skilled Intervention: Verbal Cuing  Comments: reported difficulty swallowing/eating; food removed and RN/SLP informed  Speech Language Pathology:   Problem List: Dysphagia, Cognitive-Linguistic Deficits  Diet / Liquid Recommendation: Thin (0), Regular (7)  Rehabilitation Prognosis/Potential: Good  Estimated Length of Stay: 10-14 days    Nursing:   Orientation : Oriented x 4  Catheter  Dialysis patient    Scope/Intensity of Services Recommended:  Physical Therapy: 1.5 hr / day  5 days / week. Therapeutic Interventions Required: Maximize Endurance, Mobility, Strength and Safety  Occupational Therapy: 1.5 hr / day 5 days / week. Therapeutic Interventions Required: Maximize Self Care, ADLs, IADLs and Energy Conservation  Rehabilitation Nursin/7. Therapeutic Interventions Required: Monitor Pain, Skin, Wound(s), Vital Signs, Intake and Output, Labs, Safety and Family Training  Rehabilitation Physician: 3 - 5 days / week. Therapeutic Interventions Required: Medical Management  Dietician: Nutritional Assessment. Therapeutic Interventions Required: .    He requires 24-hour rehabilitation nursing to manage bowel and bladder function, skin care, surgical incision, wound, nutrition and fluid intake, pain control, safety,  medication management and patient/family goals. In addition, rehabilitation nursing will reiterate and reinforce therapy skills and equipment use, including ADLs, as well as provide education to the patient and family. Luis Sepulveda is willing to participate in and is able to tolerate the proposed plan of care.    Rehabilitation Goals and Plan (Expected frequency & duration of treatment in the IRF):   Return to the Community, Modified Independent Level of Care and Outpatient Support  Anticipated Date of Rehabilitation Admission: 01-13-21  Patient/Family oriented IRF level of care/facility/plan: Yes  Patient/Family willing to participate in IRF care/facility/plan: Yes  Patient able to tolerate IRF level of care proposed: Yes  Patient has potential to benefit IRF level of care proposed: Yes  Comments: Not Applicable    Special Needs or Precautions - Medical Necessity:  Safety Concerns/Precautions:  Fall Risk / High Risk for Falls, Balance and Bed / Chair Alarm  Complex Wound Care: Surgical  Pain Management    Current Medications:    Current Facility-Administered Medications Ordered in Epic   Medication Dose Route Frequency Provider Last Rate Last Admin   • acetaminophen (Tylenol) tablet 650 mg  650 mg Oral Q4HRS PRN Radha Bowman M.D.       • atorvastatin (LIPITOR) tablet 40 mg  40 mg Oral Q EVENING Hussein Su M.D.   40 mg at 01/12/21 1747   • calcitRIOL (ROCALTROL) capsule 0.25 mcg  0.25 mcg Oral DAILY Hussein Su M.D.   0.25 mcg at 01/13/21 0600   • calcium acetate (PHOS-LO) 667 MG tablet 1,334 mg  1,334 mg Oral TID AC Hussein Su M.D.   1,334 mg at 01/12/21 1745   • methimazole (TAPAZOLE) tablet 5 mg  5 mg Oral Q EVENING Hussein Su M.D.   5 mg at 01/12/21 2010   • methimazole (TAPAZOLE) tablet 7.5 mg  7.5 mg Oral QAM Hussein Su M.D.   7.5 mg at 01/13/21 0600   • metoprolol (LOPRESSOR) tablet 37.5 mg  37.5 mg Oral TWICE DAILY Hussein Su M.D.   37.5 mg at 01/13/21 0600    • omeprazole (PRILOSEC) capsule 20 mg  20 mg Oral DAILY Hussein Su M.D.   20 mg at 01/13/21 0600   • senna-docusate (PERICOLACE or SENOKOT S) 8.6-50 MG per tablet 2 Tab  2 Tab Oral BID Hussein Su M.D.   Stopped at 01/11/21 0600    And   • magnesium hydroxide (MILK OF MAGNESIA) suspension 30 mL  30 mL Oral QDAY PRN Hussein Su M.D.        And   • bisacodyl (DULCOLAX) suppository 10 mg  10 mg Rectal QDAY PRN Hussein Su M.D.       • hydrOXYzine HCl (ATARAX) tablet 25 mg  25 mg Oral TID PRN Hussein Su M.D.       • ondansetron (ZOFRAN ODT) dispertab 4 mg  4 mg Oral Q4HRS PRN Hussein Su M.D.       • oxyCODONE immediate-release (ROXICODONE) tablet 5 mg  5 mg Oral Q4HRS PRN Hussein Su M.D.       • insulin regular (HumuLIN R,NovoLIN R) injection  1-6 Units Subcutaneous 4X/DAY OLINDA Su M.D.   1 Units at 01/12/21 2022    And   • dextrose 50% (D50W) injection 50 mL  50 mL Intravenous Q15 MIN PRN Hussein Su M.D.       • epoetin (Retacrit) injection (Dialysis use only) 10,000 Units  10,000 Units Intravenous TUE+THU+SAT Mario Hunter M.D.   10,000 Units at 01/12/21 0952   • benzocaine-menthol (CEPACOL) lozenge 1 Lozenge  1 Lozenge Mouth/Throat Q2HRS PRN RYLIE Esteves M.D.   1 Lozenge at 01/03/21 1330   • ondansetron (ZOFRAN) syringe/vial injection 4 mg  4 mg Intravenous Q4HRS PRN Tamiko Luna M.D.   4 mg at 01/02/21 0805     Current Outpatient Medications Ordered in Epic   Medication Sig Dispense Refill   • senna-docusate (PERICOLACE OR SENOKOT S) 8.6-50 MG Tab Take 2 Tabs by mouth 2 Times a Day. 30 Tab 0     Diet:   DIET ORDERS (From admission to next 24h)     Start     Ordered    01/10/21 1040  Diet Order Diet: Renal; Second Modifier: (optional): Consistent CHO (Diabetic)  ALL MEALS     Question Answer Comment   Diet: Renal    Second Modifier: (optional) Consistent CHO (Diabetic)        01/10/21 1040                Anticipated Discharge  Destination / Patient/Family Goal:  Destination: Home with Assistance Support System: Ex-wife & son  Anticipated home health services: OT and PT  Previously used HH service/ provider: Not Applicable  Anticipated DME Needs: Walker, Wheelchair and [employer]  Outpatient Services: OT and PT  Alternative resources to address additional identified needs:     Outpatient Dialysis Note     Confirmed patient is active at:     [place] [address] [facility]  [address]  [place] [address]     Schedule: Tuesday, Thursday, Saturday   Time: 09:45am     Patient is seen by Dr. Tomlin in HD clinic.     Spoke with Clayton at facility who confirmed.     Forwarded records for review.     Becca Bojorquez- Dialysis Coordinator  [facility] # [phone]    Pre-Screen Completed: 1/13/2021 8:16 AM Terrance Riley L.P.N.

## 2021-01-13 NOTE — WOUND TEAM
Wound consult placed on 1/8 regarding pts sacrum. Chart and images reviewed. No pressure injury identified. 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 Barrier paste applied. No further follow up indicated at this time. Pt has orders for R BKA and L heel per LPS.

## 2021-01-13 NOTE — DISCHARGE PLANNING
Anticipated Discharge Disposition:   [facility]    Action:   Received a call from Terrance Riley that Pt will be picked up at 09:30 am today and that u is aware. Per Terrance he will inform bedside RN .    Will prepare [facility] packet.    Barriers to Discharge:   None    Plan:   Will continue to assist Pt with discharge as needed.    Addendum:  This RN CM informed JAN Lopez of this discharge.   Informed Pt's Son Miguel via phone of this discharge.     Clemente informed that [facility] will transport Pt around 10;00 am .

## 2021-01-13 NOTE — DISCHARGE PLANNING
"Spoke to Sylvia @ [facility]    Transport is scheduled for 01/13 @0930 going to [facility].    Per Sylvia, [facility] logged the time as a [phone], but will have a  on the way \"right now\".  Sylvia will call [facility] if any problem arises.    JAN Lopez notified   "

## 2021-01-13 NOTE — FLOWSHEET NOTE
01/13/21 1314   Patient History   Pulmonary Diagnosis Hx of ZULMA  (has not used in a long time, suggested he talk to his DrTrey)   Procedures Relevant to Respiratory Status no   Home O2 No   Nocturnal CPAP No   Home Treatments/Frequency No   Sleep Apnea Screening   Have you had a sleep study? Yes   Have you been diagnosed with sleep apnea? Yes   Do you use a CPAP/BIPAP/AUTOPAP? No

## 2021-01-13 NOTE — DISCHARGE PLANNING
Msg placed to Dr. Bowman-seeking medical clearance.  COVID PCR (wet swab) expires @ 1025 this am as [facility] requires a negative PCR COVID (wet swab) within 48 hours of an admission.  A COVID PCR (wet swab) has been ordered this am in anticipation of medical clearance soon-Jessica POWERSN is aware.  [facility] remains monitoring.    0807-Dr. Bowman has medically cleared.  Case is under review by Dr. Goodman.     0811- Dr. Goodman has accepted via Vo[facility].  Transport has been arranged for 0930.  Dr. Bowman, Stephen WILLIS & Jessica BSN are aware.  left for Miguel, son.

## 2021-01-13 NOTE — DISCHARGE PLANNING
CASE MANAGEMENT INITIAL ASSESSMENT    Admit Date:  1/13/2021     Attempted to reach patient on room phone and cell phone. Left message on cell phone.    I spoke with patient's son Miguel via phone to discuss role of case management / discharge planning / team conference.     Patient is a  75 y.o. male transferred from [facility] s/p Right BKA on 12/31/2020.  He was inpatient 12/31/2020 to 1/13/2021.     PMHx:  ESRD on HD, HTN, Anemia, DM, PVD    PLOF:  Patient lives with his ex-wife in a H with no LIZ in [place].  His son Miguel moved in temporarily about 4 months ago to assist with health issues.  Patient previously ambulated with a FWW independently.  He has a shower chair and there are grab bars in place.  Family provides transportation to appts and HD.    PCP:  Patito Edgar MD (per facesheet - to be confirmed)  Ortho Surg: Leobardo Lynn MD  Cardiology: Kulwant Osborn MD    Dialysis:  [facility]    Admitting MD:  Lata Goodman MD    Diagnosis: S/P BKA (below knee amputation) unilateral ([facility]) [Z89.519]    Co-morbidities:   Patient Active Problem List    Diagnosis Date Noted   • Leukocytosis 01/11/2021     Priority: High   • Encephalopathy 01/07/2021     Priority: High   • Polyarthritis 06/25/2018     Priority: High   • Frailty syndrome in geriatric patient 01/07/2021     Priority: Medium   • Urinary retention 01/07/2021     Priority: Medium   • Peripheral vascular disease (HCC) 04/23/2020     Priority: Medium   • Paroxysmal A-fib ([facility]) 11/16/2019     Priority: Medium   • Type 2 diabetes mellitus with kidney complication, with long-term current use of insulin ([facility]) 08/13/2019     Priority: Medium   • End stage renal disease on dialysis ([facility]) 08/13/2019     Priority: Medium   • Essential hypertension 08/15/2018     Priority: Medium   • Anemia 08/15/2018     Priority: Medium   • Hyperthyroidism 06/25/2018     Priority: Medium   • CAD (coronary artery disease) 06/25/2018     Priority:  Medium   • Secondary hyperparathyroidism of renal origin (HCC) 11/15/2019     Priority: Low   • Dyslipidemia 04/11/2019     Priority: Low   • Gastroesophageal reflux disease 08/15/2018     Priority: Low   • 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) 01/13/2021   • Cardiomyopathy, ischemic 06/02/2020   • Anticoagulated 06/02/2020   • Left anterior fascicular block 06/02/2020   • Right bundle branch block 06/02/2020   • Diastolic dysfunction 03/26/2020   • Thyroid nodule 04/11/2019   • Hepatic cyst 08/02/2018   • Renal cyst 08/02/2018     Prior Living Situation:  Housing / Facility: [address] [facility]  Lives with - Patient's Self Care Capacity: Other (Comments)(lives with ex-wife; son has moved in temporarily to assist;)    Prior Level of Function:  Medication Management: Requires Assist  Finances: Requires Assist  Home Management: Requires Assist  Shopping: Requires Assist  Prior Level Of Mobility: Independent With Device in Community, Independent With Device in Home  Driving / Transportation: Relatives / Others Provide Transportation    Support Systems:  Primary : Miguel Sepulveda, son, [phone]  Other support systems: lives with ex-wife       Previous Services Utilized:   Equipment Owned: Front-Wheel Walker, Grab Bar(s) In Tub / Shower, Tub / Shower Seat  Prior Services: Skilled Home Health Services    Other Information:        Primary Payor Source: Medicare A, Medicare B  Secondary Payor Source: Supplemental Insurance(also has VA benefits)    Patient / Family Goal:  Patient / Family Goal: to return to PLOF at home and to be able to walk from bedroom to bath and to kitchen    Additional Case Management Questions:  Have you ever received case management services for yourself or a family member?    Do you feel you have and an understanding of what services  provide?    Do you have any additional questions regarding case management?          CASE MANAGEMENT PLAN OF CARE    Individualized Goals:   1.  To return to PLOF at home   2.  To be able to walk from bedroom to bath and to kitchen  3.    Barriers:   1.  Functional limitations - new BKA  2.  3.    Plan:  1. Continue to follow patient through hospitalization and provide discharge planning in collaboration with patient, family, physicians and ancillary services.     2. Utilize community resources to ensure a safe discharge.

## 2021-01-13 NOTE — PROGRESS NOTES
[facility] -  PROGRESS NOTE               Author: oBston Tristan M.D. Date & Time: 1/13/2021  9:28 AM     HPI:   A 75-year-old male with end-stage renal disease   on chronic hemodialysis Tuesdays, Thursdays and Saturdays.  The patient was   admitted on 12/31 for right below-the-knee amputation.  He had wound and   gangrene of the right foot.  He has had some peripheral vascular procedures   previously.  He did have a right ray amputation in 11/2020.  He has had a   nonhealing wound and he was admitted for right below-the-knee amputation that   was performed on 12/31.  We have been consulted to assist in his dialysis   needs.  He had dialysis on 12/31 prior to admission.  Today, he is feeling   dyspneic.  He has no cough or sputum production.    DAILY NEPHROLOGY SUMMARY:  1/1 - Consult seen  1/2 - Tolerated HD yesterday via TDC  1/3 - Tolerated HD via AVF.  No issues per HD nurse.  Complains of itchiness this morning.  Requesting benadryl  1/4: No acute events, pending rehab placement, tired this am, sedated.   1/5: Pending transfer to inpatient rehab when medically cleared, permacath removed   1/6: feels slightly congeted, tolerated HD, s/p PRBC transfusion with HGB to 7.5 but downt o 6.6 this AM  1/7: No acute events, s/p PRBC transfusion  1/8: Tolerated HD, ongoing encephalopathy after stopping narcotics.   1/9: Mentally more clear, discussions yesterday to establish GOC, he mentioned his wishes abut considering comfort measures and has discussed these with his wife and son but hasn't made a firm decision yet.   1/10; mentally more clear today.  Remembered discussion he had with me yesterday..  More cheerful today and does not want to discuss comfort care, looking forward to go to rehab  1/11: Pt sitting up in bed, feels ok, denies any current complaints, needs Covid test to transition to rehab, VSS  1/12: Seen on HD today, no new complaints, No acute issues overnight, stable overall  "  1/13: Had HD yesterday, uneventful treatment, hopeful of discharge to Rehab this AM     REVIEW OF SYSTEMS:    10 point ROS was performed and is as per HPI or otherwise negative     PMH/PSH/SH/FH: Reviewed and unchanged since admission note  CURRENT MEDICATIONS: Reviewed from admission to present day    PHYSICAL EXAM:  VS:  /66   Pulse 79   Temp 36.9 °C (98.4 °F) (Temporal)   Resp 16   Ht 1.676 m (5' 5.98\")   Wt 71.9 kg (158 lb 8.2 oz)   SpO2 100%   BMI 25.60 kg/m²   GENERAL: well developed, well nourished, white male, no acute distress  CV: trace edema   RESP: non-labored  GI: Soft  MSK: Rt. BKA , dressing in place   SKIN: No concerning rashes  NEURO: AOx3  PSYCH: Cooperative  ACCESS: L AVF +T/B    Fluids:  In: 620 [P.O.:120; Dialysis:500]  Out: 2700     LABS:  Recent Labs     01/11/21  1023 01/12/21  0930 01/13/21  0411   SODIUM 136 130* 135   POTASSIUM 3.8 3.5* 3.3*   CHLORIDE 98 94* 98   CO2 23 23 26   GLUCOSE 171* 121* 126*   BUN 34* 39* 21   CREATININE 6.89* 7.36* 4.96*   CALCIUM 9.5 8.9 8.9       IMPRESSION:  #  End-stage renal disease, on chronic hemodialysis qTTS  - Normally dialyzes at [facility] via L AVF   #  Status post right BKA on 12/31/2020 for diabetic ulcer.  #  Hypertension, controlled.  #  Anemia: superimposed blood loss in addition to chronic kidney disease.  - Hgb below target   #  Diabetes mellitus.  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  - On calcitriol and calcium acetate   #  Peripheral vascular disease.  #  Paroxysmal atrial fibrillation, on Eliquis and metoprolol.   #  Coronary artery disease, status post stents.  Asymptomatic.  #  History of hyperthyroidism. On methimazole.   #  Dyslipidemia, on statin therapy.   #  Past history of heavy tobacco use.  #  Pneumonia: on IV Zosyn   #  Encephalopathy: resolving.   #  Weakness/debility     PLAN:  -Continue hemodialysis qTTS - last HD 1/12/20 - continue same orders on discharge to Rehab    -ARMANDO TIW with HD, goal Hgb 10-11 "   -Transfuse PRN Hgb <7  -Continue home medications   -No dietary protein restrictions  -Dose meds for ESRD  -Limit narcotics/sedating meds  -PT/OT  -Will follow at Rehab once transferred     Ok to discharge to Rehab from renal standpoint   Thank you,

## 2021-01-14 PROBLEM — Z78.9 IMPAIRED MOBILITY AND ADLS: Status: ACTIVE | Noted: 2021-01-01

## 2021-01-14 PROBLEM — Z74.09 IMPAIRED MOBILITY AND ADLS: Status: ACTIVE | Noted: 2021-01-01

## 2021-01-14 PROBLEM — E87.6 HYPOKALEMIA: Status: ACTIVE | Noted: 2021-01-01

## 2021-01-14 NOTE — THERAPY
"Occupational Therapy   Initial Evaluation     Patient Name: Luis Sepulveda  Age:  75 y.o., Sex:  male  Medical Record #: [id number]  Today's Date: 1/14/2021     Subjective    \"Sorry I'm so chatty. I just haven't talked to anyone in a long, long time.\" re: pt telling life story during ADLs    \"I used to have a port for my dialysis but it was infected so they took it out and its healing. They use this fistula now.\" pointing to L arm    \"I want to be able to do this one my own when I get home so I'd like to try today.\" re: water proofing IV and R limb     Objective       01/14/21 0701   Prior Living Situation   Prior Services Home-Independent  (Assist for iADLs only)   Housing / Facility 1 Story House   Steps Into Home 0   Steps In Home 0   Elevator No   Bathroom Set up Shower Chair;Grab Bars   Equipment Owned Front-Wheel Walker;Grab Bar(s) In Tub / Shower;Tub / Shower Seat   Lives with - Patient's Self Care Capacity   (lives with ex-wife and son moved in for assist 4 months ago)   Prior Level of ADL Function   Self Feeding Independent   Grooming / Hygiene Independent   Bathing Independent   Dressing Independent   Toileting Independent   Comments per pt report   Prior Level of IADL Function   Medication Management Requires Assist   Laundry Requires Assist   Kitchen Mobility Requires Assist   Finances Requires Assist   Home Management Requires Assist   Shopping Requires Assist   Prior Level Of Mobility Independent Without Device in Community;Independent Without Device in Home  (per pt)   Driving / Transportation Relatives / Others Provide Transportation   Occupation (Pre-Hospital Vocational) Retired Due To Age  (Many jobs, )   Leisure Interests   (spending time with grandchildren)   Prior Functioning: Everyday Activities   Self Care Independent   Indoor Mobility (Ambulation) Independent   Stairs Independent   Functional Cognition Independent   Prior Device Use None of the given options   Vitals   O2 Delivery " "Device None - Room Air   Cognition    Level of Consciousness Alert   Ability To Follow Commands 3 Step   Safety Awareness Impaired   Comments Needing several prompts to not WB through R limb   Cognitive Pattern Assessment   Cognitive Pattern Assessment Used BIMS   Brief Interview for Mental Status (BIMS)   Repetition of Three Words (First Attempt) 3   Temporal Orientation: Year Correct   Temporal Orientation: Month Accurate within 5 days   Temporal Orientation: Day Correct   Recall: \"Sock\" Yes, no cue required   Recall: \"Blue\" Yes, no cue required   Recall: \"Bed\" Yes, no cue required   BIMS Summary Score 15   Vision Screen   Vision Not tested  (wears reading glasses)   Active ROM Upper Body   Dominant Hand Right   Comments no limitations noted functionally. Not able to test formally due to time.   Sensation Upper Body   Comments BLE and BUE neuropathy   Eating   Assistance Needed Independent   Physical Assistance Level No physical assistance   CARE Score 6   Eating Discharge Goal   Discharge Goal Independent   Oral Hygiene   Assistance Needed Independent  (seated)   Physical Assistance Level No physical assistance   CARE Score 6   Oral Hygiene Discharge Goal   Discharge Goal Independent  (seated)   Shower/Bathe Self   Assistance Needed Verbal cues;Supervision;Physical assistance   Physical Assistance Level 25% or less   Comment CGA when standing to wash buttocks   CARE Score 3   Shower/Bathe Self Discharge Goal   Discharge Goal Independent   Upper Body Dressing   Assistance Needed Supervision;Set-up / clean-up   Physical Assistance Level No physical assistance   Comment set up only   CARE Score 4   Upper Body Dressing Discharge Goal   Discharge Goal Independent   Lower Body Dressing   Assistance Needed Physical assistance;Verbal cues;Supervision;Set-up / clean-up;Adaptive equipment   Physical Assistance Level 76% or more   Comment w/c and grab bar   CARE Score 2   Lower Body Dressing Discharge Goal   Discharge Goal " Independent   Putting On/Taking Off Footwear   Assistance Needed Physical assistance;Set-up / clean-up;Supervision;Verbal cues   Physical Assistance Level 26%-50%   CARE Score 3   Putting On/Taking Off Footwear Discharge Goal   Discharge Goal Independent   Toileting Hygiene   Assistance Needed Physical assistance;Supervision;Set-up / clean-up   Physical Assistance Level 51%-75%   CARE Score 2   Toileting Hygiene Discharge Goal   Discharge Goal Independent   Toilet Transfer   Assistance Needed Verbal cues;Supervision;Physical assistance   Physical Assistance Level 26%-50%   CARE Score 3   Toilet Transfer Discharge Goal   Discharge Goal Independent   Hearing, Speech, and Vision   Expression of Ideas and Wants Without difficulty   Understanding Verbal and Non-Verbal Content Understands   Functional Level of Assist   Eating Independent   Grooming Modified Independent;Seated   Grooming Description Seated in wheelchair at sink   Bathing Contact Guard Assist  (pt even participated in waterproofing limb)   Bathing Description Grab bar;Increased time;Set-up of equipment;Supervision for safety;Verbal cueing   Upper Body Dressing Supervision   Upper Body Dressing Description Set-up of equipment   Lower Body Dressing Maximal Assist   Lower Body Dressing Description Grab bar;Set-up of equipment;Supervision for safety;Verbal cueing;Increased time;Assist with threading into pant leg;Application of orthotic or brace   Toileting Moderate Assist   Toileting Description Grab bar;Increased time;Set-up of equipment;Supervision for safety;Verbal cueing   Bed, Chair, Wheelchair Transfer Minimal Assist   Bed Chair Wheelchair Transfer Description Increased time;Set-up of equipment;Supervision for safety;Verbal cueing   Toilet Transfers Minimal Assist   Toilet Transfer Description Grab bar;Set-up of equipment;Supervision for safety;Verbal cueing   Tub / Shower Transfers Minimal Assist   Tub Shower Transfer Description Grab bar;Set-up of  equipment;Supervision for safety;Verbal cueing   Problem List   Problem List Decreased Active Daily Living Skills;Decreased Homemaking Skills;Decreased Upper Extremity Strength Right;Decreased Upper Extremity Strength Left;Decreased Functional Mobility;Decreased Activity Tolerance;Safety Awareness Deficits / Cognition   Precautions   Precautions Fall Risk;Non Weight Bearing Right Lower Extremity;Other (See Comments)   Comments Dialysis Tues, Thurs, Sat; L arm fistula; previously used port   Current Discharge Plan   Current Discharge Plan Return to Prior Living Situation   Benefit    Therapy Benefit Patient Would Benefit from Inpatient Rehab Occupational Therapy to Maximize [place] with ADLs, IADLs and Functional Mobility.   Interdisciplinary Plan of Care Collaboration   IDT Collaboration with  Nursing   Patient Position at End of Therapy Seated;Self Releasing Lap Belt Applied;Call Light within Reach;Tray Table within Reach;Phone within Reach   Collaboration Comments re: CLOF and blood sugar   Equipment Needs   Assistive Device / DME Front-Wheel Walker;Wheelchair   Adaptive Equipment Reacher;Sock Aide;Dressing Stick  (due to arthritis in hip impacting LB Dressing)   Strengths & Barriers   Strengths Alert and oriented;Effective communication skills;Independent prior level of function;Making steady progress towards goals;Pleasant and cooperative;Supportive family;Willingly participates in therapeutic activities   Barriers Decreased endurance;Fatigue;Impaired balance  (safety awareness)   OT Total Time Spent   OT Individual Total Time Spent (Mins) 75   OT Charge Group   Charges Yes   OT Self Care / ADL 2   OT Evaluation OT Evaluation Mod       Assessment  Patient is 75 y.o. male with a diagnosis of right below the knee amputation. Pt had Toe amputation on 11- which was not healing so he was adm. on 12-31-20 for RBKA. Pt had post operative encephalopathy.  Additional factors influencing patient status /  progress (ie: cognitive factors, co-morbidities, social support, etc): diabetes, end-stage renal disease on hemodialysis, peripheral artery disease, coronary artery disease, paroxysmal atrial fibrillation, hyperthyroidism, hyperlipidemia. Pt's dialysis days are Tues, Thurs, Sat. Neuropathy bilateral UE and LE. Pt lives in a [facility] with his ex-wife and his son who just moved in about 4 months ago to help him. Pt performs his ADLs independently, but has help for iADLs. Pt with impairments to strength, endurance, knowledge of AE/DME, balance, knowledge of post op precautions impacting safety and independence with I/ADLs.     Plan  Recommend Occupational Therapy  minutes per day 5-7 days per week for 10-14 days for the following treatments:  OT Group Therapy, OT Self Care/ADL, OT Cognitive Skill Dev, OT Community Reintegration, OT Manual Ther Technique, OT Neuro Re-Ed/Balance, OT Therapeutic Activity, OT Evaluation and OT Therapeutic Exercise.    Goals:  Long term and short term goals have been discussed with patient and they are in agreement.    Occupational Therapy Goals     Problem: Dressing     Dates: Start: 01/14/21       Goal: STG-Within one week, patient will dress LB     Dates: Start: 01/14/21       Note:     Goal Note filed on 01/14/21 1519 by Zeina Taylor MS,OTR/L    1) Individualized Goal:  with Min A and AE as needed  2) Interventions:  OT Group Therapy, OT Self Care/ADL, OT Cognitive Skill Dev, OT Community Reintegration, OT Manual Ther Technique, OT Neuro Re-Ed/Balance, OT Therapeutic Activity, OT Evaluation, and OT Therapeutic Exercise                         Problem: Functional Transfers     Dates: Start: 01/14/21       Goal: STG-Within one week, patient will transfer to toilet     Dates: Start: 01/14/21       Note:     Goal Note filed on 01/14/21 1519 by Zeina Taylor MS,OTR/L    1) Individualized Goal:  with CGA and AE as needed  2) Interventions:  OT Group Therapy, OT Self Care/ADL, OT  Cognitive Skill Dev, OT Community Reintegration, OT Manual Ther Technique, OT Neuro Re-Ed/Balance, OT Therapeutic Activity, OT Evaluation, and OT Therapeutic Exercise                   Goal: STG-Within one week, patient will transfer to tub/shower     Dates: Start: 01/14/21       Note:     Goal Note filed on 01/14/21 1519 by Zeina Taylor MS,OTR/L    1) Individualized Goal:  with CGA and AE as needed  2) Interventions:  OT Group Therapy, OT Self Care/ADL, OT Cognitive Skill Dev, OT Community Reintegration, OT Manual Ther Technique, OT Neuro Re-Ed/Balance, OT Therapeutic Activity, OT Evaluation, and OT Therapeutic Exercise                         Problem: OT Long Term Goals     Dates: Start: 01/14/21       Goal: LTG-By discharge, patient will complete basic self care tasks     Dates: Start: 01/14/21       Note:     Goal Note filed on 01/14/21 1519 by Zeina Taylor MS,OTR/L    1) Individualized Goal:  with mod I and AE as needed  2) Interventions:  OT Group Therapy, OT Self Care/ADL, OT Cognitive Skill Dev, OT Community Reintegration, OT Manual Ther Technique, OT Neuro Re-Ed/Balance, OT Therapeutic Activity, OT Evaluation, and OT Therapeutic Exercise                   Goal: LTG-By discharge, patient will perform bathroom transfers     Dates: Start: 01/14/21       Note:     Goal Note filed on 01/14/21 1519 by Zeina Taylor MS,OTR/L    1) Individualized Goal:  with mod I and AE as needed  2) Interventions:  OT Group Therapy, OT Self Care/ADL, OT Cognitive Skill Dev, OT Community Reintegration, OT Manual Ther Technique, OT Neuro Re-Ed/Balance, OT Therapeutic Activity, OT Evaluation, and OT Therapeutic Exercise                         Problem: Toileting     Dates: Start: 01/14/21       Goal: STG-Within one week, patient will complete toileting tasks     Dates: Start: 01/14/21       Note:     Goal Note filed on 01/14/21 1519 by Zeina Taylor MS,OTR/L    1) Individualized Goal:  with Min A and AE as  needed  2) Interventions:  OT Group Therapy, OT Self Care/ADL, OT Cognitive Skill Dev, OT Community Reintegration, OT Manual Ther Technique, OT Neuro Re-Ed/Balance, OT Therapeutic Activity, OT Evaluation, and OT Therapeutic Exercise

## 2021-01-14 NOTE — CARE PLAN
Problem: Communication  Goal: The ability to communicate needs accurately and effectively will improve  Outcome: PROGRESSING AS EXPECTED  Note: Pt is able to communicate his needs effectively to staff.      Problem: Safety  Goal: Will remain free from injury  Outcome: PROGRESSING AS EXPECTED  Note: Pt uses call light consistently for staff assistance. Pt has good safety awareness, no impulsivity observed.      Problem: Bowel/Gastric:  Goal: Normal bowel function is maintained or improved  Outcome: PROGRESSING AS EXPECTED  Note: Pt is continent of bowel and reports having daily BMs

## 2021-01-14 NOTE — ASSESSMENT & PLAN NOTE
Hba1c: 7.8 (11/14/20) --> 5.2 (1/15)  BS good  On Lantus: 8 units qam   Will d/c accuchecks since BS have been good and no coverage needed (1/29)  Note: home meds were Lantus 5 units qhs  Cont to monitor

## 2021-01-14 NOTE — PROGRESS NOTES
2 RN skin check done with Mandeep. Face photo and skin photos documented in media. Appropriate LDAs opened.   Pt with Kwame score of 17, RN wound protocol ordered on 1/13, orders current. Prevention measures in place including, waffle overlay, barrier paste, turn q2 while in bed and mepilex for L heel.  Patient has Moisture Associated dermatitis on coccyx, barrier paste applied. L heel is red and is covered with mepilex heel. L toe amp. R BKA with sutures. Penis has pus and pt c/o sore area. Dr Goodman notified. Wound culture ordered.

## 2021-01-14 NOTE — CARE PLAN
Problem: Safety  Goal: Will remain free from injury  Outcome: PROGRESSING AS EXPECTED  Note: Reviewed fall and safety precautions with patient and reminded patient to call for assistance before getting out of bed. Patient verbalized understanding and has not attempted self transfer this shift.      Problem: Urinary Elimination:  Goal: Ability to reestablish a normal urinary elimination pattern will improve  Outcome: PROGRESSING SLOWER THAN EXPECTED  Note: Patient was able to void about 250ml in urinal this am. Post void bladder scan was 401ml. Unable to re-void and refused to be cathed due to pain. C/o pain when trying to urinate. Dr Pantoja notified. Unable to void again in the afternoon. He agreed to have straight cath done with lidocaine jelly. Straight cath performed and UA collected. Patient tolerated well.

## 2021-01-14 NOTE — PROGRESS NOTES
Nephrology Daily Progress Note    Date of Service  1/14/2021    Chief Complaint  A 75-year-old male with end-stage renal disease   on chronic hemodialysis Tuesdays, Thursdays and Saturdays.  The patient was   admitted on 12/31 for right below-the-knee amputation.  He had wound and   gangrene of the right foot.  He has had some peripheral vascular procedures   previously.  He did have a right ray amputation in 11/2020.  He has had a   nonhealing wound and he was admitted for right below-the-knee amputation that   was performed on 12/31.  We have been consulted to assist in his dialysis   needs.  He had dialysis on 12/31 prior to admission.  Today, he is feeling   dyspneic.  He has no cough or sputum production.    Interval Problem Update  1/13- Transferred to Rehab  1/14 - Pt sitting up in W/C in room, denies any complaints/concerns, due for HD today, BP labile, K+ 3.1, per chart review-penile discharge cx sent    Review of Systems  ROS   10 point ROS was performed and is as per HPI or otherwise negative     Physical Exam  Temp:  [36.4 °C (97.6 °F)-36.6 °C (97.8 °F)] 36.6 °C (97.8 °F)  Pulse:  [68-80] 68  Resp:  [17-18] 18  BP: (125-174)/(52-68) 155/68  SpO2:  [99 %-100 %] 100 %    Physical Exam  Constitutional:       Appearance: Normal appearance.   HENT:      Head: Normocephalic and atraumatic.      Nose: Nose normal.      Mouth/Throat:      Mouth: Mucous membranes are moist.      Pharynx: Oropharynx is clear.   Eyes:      Extraocular Movements: Extraocular movements intact.      Pupils: Pupils are equal, round, and reactive to light.   Neck:      Musculoskeletal: Normal range of motion and neck supple.   Cardiovascular:      Rate and Rhythm: Normal rate and regular rhythm.      Pulses: Normal pulses.      Comments: L AVF +T/B  Pulmonary:      Effort: Pulmonary effort is normal.      Breath sounds: Normal breath sounds.   Abdominal:      General: Bowel sounds are normal.      Palpations: Abdomen is soft.    Musculoskeletal:         General: No deformity.      Comments: R BKA    Skin:     General: Skin is warm and dry.   Neurological:      General: No focal deficit present.      Mental Status: He is alert and oriented to person, place, and time.   Psychiatric:         Mood and Affect: Mood normal.         Behavior: Behavior normal.         Fluids    Intake/Output Summary (Last 24 hours) at 1/14/2021 0919  Last data filed at 1/14/2021 0554  Gross per 24 hour   Intake 240 ml   Output 360 ml   Net -120 ml       Laboratory  Recent Labs     01/12/21 0930 01/13/21 0411 01/14/21  0517   WBC 11.1* 11.8* 9.8   RBC 2.77* 2.90* 2.80*   HEMOGLOBIN 8.8* 9.2* 8.7*   HEMATOCRIT 27.1* 28.7* 27.9*   MCV 97.8 99.0* 99.6*   MCH 31.8 31.7 31.1   MCHC 32.5* 32.1* 31.2*   RDW 54.5* 55.6* 57.1*   PLATELETCT 356 324 337   MPV 8.9* 8.8* 8.9*     Recent Labs     01/12/21 0930 01/13/21 0411 01/14/21  0517   SODIUM 130* 135 133*   POTASSIUM 3.5* 3.3* 3.1*   CHLORIDE 94* 98 95*   CO2 23 26 26   GLUCOSE 121* 126* 142*   BUN 39* 21 27*   CREATININE 7.36* 4.96* 6.59*   CALCIUM 8.9 8.9 9.0         No results for input(s): NTPROBNP in the last 72 hours.        Imaging  Available labs, imaging and clinical documentation reviewed.     Assessment/Plan  #  End-stage renal disease, on chronic hemodialysis qTTS  - Normally dialyzes at [facility] via L AVF   #  Status post right BKA on 12/31/2020 for diabetic ulcer.  #  Hypertension, fair control.  - On metoprolol   #  Anemia: superimposed blood loss in addition to chronic kidney disease.  - Hgb below target   - On ARMANDO   #  Diabetes mellitus.  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  - On calcitriol and calcium acetate   - Vit D 48   - Phos 2.5  #  Peripheral vascular disease.   #  Paroxysmal atrial fibrillation, on Eliquis and metoprolol.   #  Coronary artery disease, status post stents.  Asymptomatic.  #  History of hyperthyroidism. On methimazole.   #  Dyslipidemia, on statin therapy.   #  Past  history of heavy tobacco use.  #  Pneumonia: s/p IV Zosyn   #  Encephalopathy: resolving  #  Weakness/debility  #  Urinary retention requiring catheter placement, since removed  - Chronic oliguria  #  Hypokalemia  #  Hyponatremia in ESRD, mild     PLAN:  -Continue hemodialysis qTTS/PRN, treatment due today (THURS)  -UF as tolerated  -Recommend repleting K+ to maintain normokalemia. Will use high K+ bath today.  -Trend phos, decreasing calcium acetate to 667 mg TID WM  -ARMANDO TIW with HD, goal Hgb 10-11   -Check iron stores/ferritin  -Transfuse PRN Hgb <7  -Continue home medications   -Goal BP <140/90  -No dietary protein restrictions  -Dose meds for ESRD  -Limit narcotics/sedating meds  -PT/OT/Speech  -Follow labs    Thank you,

## 2021-01-14 NOTE — THERAPY
Physical Therapy   Initial Evaluation     Patient Name: Luis Sepulveda  Age:  75 y.o., Sex:  male  Medical Record #: [id number]  Today's Date: 1/14/2021     Subjective    Pt reports he is agreeable to PT eval      Objective       01/14/21 1001   Prior Living Situation   Prior Services Home-Independent   Housing / Facility 1 Story House   Steps Into Home 0   Steps In Home 0   Equipment Owned Front-Wheel Walker   Lives with - Patient's Self Care Capacity Other (Comments)  (lives with ex-wife; son has moved in temporarily to assist;)   Comments Pt wasnt driving previously   Prior Level of Functional Mobility   Bed Mobility Independent   Transfer Status Independent   Ambulation Independent   Distance Ambulation (Feet) 1000   Assistive Devices Used None   Wheelchair Other (Comments)  (NA)   Stairs Independent   Prior Functioning: Everyday Activities   Indoor Mobility (Ambulation) Independent   Stairs Independent   Prior Device Use None of the given options   Passive ROM Lower Body   Passive ROM Lower Body X   Comments RLE NT dt pain   Active ROM Lower Body    Active ROM Lower Body  X   Comments RLE NT dt pain   Strength Lower Body   Lower Body Strength  X   Comments RLE NT dt pain, LLE 5/5 globally quick screen   Sensation Lower Body   Lower Extremity Sensation   X   Rt Lower Extremity Light Touch Impaired   Lt Lower Extremity Light Touch Impaired   Paresthesia Present    Comments BLE neuropathy previously   Lower Body Muscle Tone   Lower Body Muscle Tone  WDL   Balance Assessment   Sitting Balance (Static) Fair +   Sitting Balance (Dynamic) Fair -   Standing Balance (Static) Trace   Standing Balance (Dynamic) Dependent   Weight Shift Sitting Good   Weight Shift Standing Absent   Bed Mobility    Supine to Sit Minimal Assist   Sit to Supine Minimal Assist   Sit to Stand Moderate Assist   Scooting Supervised   Rolling Minimal Assist to Rt.;Minimum Assist to Lt.   Neurological Concerns   Neurological Concerns No    Coordination Lower Body    Coordination Lower Body  Not Tested   Comments DT pain in RLE   Roll Left and Right   Assistance Needed Physical assistance   Physical Assistance Level 25% or less   CARE Score 3   Roll Left and Right Discharge Goal   Discharge Goal Independent   Sit to Lying   Assistance Needed Physical assistance   Physical Assistance Level 25% or less   CARE Score 3   Sit to Lying Discharge Goal   Discharge Goal Independent   Lying to Sitting on Side of Bed   Assistance Needed Physical assistance   Physical Assistance Level 25% or less   CARE Score 3   Lying to Sitting on Side of Bed Discharge Goal   Discharge Goal Independent   Sit to Stand   Assistance Needed Physical assistance   Physical Assistance Level 26%-50%   CARE Score 3   Sit to Stand Discharge Goal   Discharge Goal Independent   Chair/Bed-to-Chair Transfer   Assistance Needed Physical assistance   Physical Assistance Level 26%-50%   CARE Score 3   Chair/Bed-to-Chair Transfer Discharge Goal   Discharge Goal Independent   Car Transfer   Reason if not Attempted Safety concerns   CARE Score 88   Car Transfer Discharge Goal   Discharge Goal Supervision or touching assistance   Walk 10 Feet   Reason if not Attempted Safety concerns   CARE Score 88   Walk 10 Feet Discharge Goal   Discharge Goal Supervision or touching assistance   Walk 50 Feet with Two Turns   Reason if not Attempted Safety concerns   CARE Score 88   Walk 50 Feet with Two Turns Discharge Goal   Discharge Goal Supervision or touching assistance   Walk 150 Feet   Reason if not Attempted Safety concerns   CARE Score 88   Walk 150 Feet Discharge Goal   Discharge Goal Supervision or touching assistance   Walking 10 Feet on Uneven Surfaces   Reason if not Attempted Safety concerns   CARE Score 88   Walking 10 Feet on Uneven Surfaces Discharge Goal   Discharge Goal Supervision or touching assistance   1 Step (Curb)   Reason if not Attempted Safety concerns   CARE Score 88   1 Step  (Curb) Discharge Goal   Discharge Goal Not applicable   4 Steps   Reason if not Attempted Safety concerns   CARE Score 88   4 Steps Discharge Goal   Discharge Goal Not applicable   12 Steps   Reason if not Attempted Safety concerns   CARE Score 88   12 Steps Discharge Goal   Discharge Goal Not applicable   Picking Up Object   Reason if not Attempted Safety concerns   CARE Score 88   Picking Up Object Discharge Goal   Discharge Goal Supervision or touching assistance   Wheel 50 Feet with Two Turns   Assistance Needed Supervision;Adaptive equipment   Physical Assistance Level No physical assistance   CARE Score 4   Wheel 50 Feet with Two Turns Discharge Goal   Discharge Goal Independent   Wheel 150 Feet   Assistance Needed Supervision;Adaptive equipment   Physical Assistance Level No physical assistance   CARE Score 4   Wheel 150 Feet Discharge Goal   Discharge Goal Independent   Gait Functional Level of Assist    Gait Level Of Assist Unable to Participate  (safety concerns)   Wheelchair Functional Level of Assist   Wheelchair Assist Supervised   Distance Wheelchair (Feet or Distance) 150   Wheelchair Description Adaptive equipment;Extra time;Safety concerns;Supervision for safety;Verbal cueing   Stairs Functional Level of Assist   Level of Assist with Stairs Unable to Participate   Transfer Functional Level of Assist   Bed, Chair, Wheelchair Transfer Moderate Assist   Bed Chair Wheelchair Transfer Description Adaptive equipment;Increased time;Requires lift;Verbal cueing   Problem List    Problems Impaired Bed Mobility;Impaired Transfers;Impaired Ambulation;Functional Strength Deficit;Impaired Balance;Pain;Functional ROM Deficit;Decreased Activity Tolerance   Precautions   Precautions Fall Risk;Non Weight Bearing Right Lower Extremity   Current Discharge Plan   Current Discharge Plan Return to Prior Living Situation   Interdisciplinary Plan of Care Collaboration   Patient Position at End of Therapy Seated;Call Light  within Reach;Tray Table within Reach;Self Releasing Lap Belt Applied;Chair Alarm On   Benefit   Therapy Benefit Patient Would Benefit from Inpatient Rehabilitation Physical Therapy to Maximize Functional [place] with ADLs, IADLs and Mobility.   Strengths & Barriers   Strengths Pleasant and cooperative;Able to follow instructions;Independent prior level of function;Motivated for self care and independence;Willingly participates in therapeutic activities   Barriers Decreased endurance;Generalized weakness;Impaired balance;Pain   PT Total Time Spent   PT Individual Total Time Spent (Mins) 60   PT Charge Group   PT Therapeutic Activities 1   PT Evaluation PT Evaluation High       Assessment  Patient is 75 y.o. male with a diagnosis of R BKA.  Additional factors influencing patient status / progress (ie: cognitive factors, co-morbidities, social support, etc): See H&P for PMH, comorbidities, and other factors. See flow sheet for list of impairments, functional deficits, and social support.      Plan  Recommend Physical Therapy  minutes per day 5-7 days per week for 3 weeks for the following treatments:  PT Gait Training, PT Therapeutic Exercises, PT Neuro Re-Ed/Balance, PT Therapeutic Activity, PT Manual Therapy and PT Evaluation.    Goals:  Long term and short term goals have been discussed with patient and they are in agreement.    Physical Therapy Problems     Problem: Mobility     Dates: Start: 01/14/21       Goal: STG-Within one week, patient will propel wheelchair community     Dates: Start: 01/14/21       Description: 1) Individualized goal:  150 ft At mod I in order to improve activity tolerance propelling himself around facility  2) Interventions:  PT Gait Training, PT Therapeutic Exercises, PT Neuro Re-Ed/Balance, PT Therapeutic Activity, PT Manual Therapy, and PT Evaluation                  Problem: Mobility Transfers     Dates: Start: 01/14/21       Goal: STG-Within one week, patient will transfer  bed to chair     Dates: Start: 01/14/21       Description: 1) Individualized goal:  At min A with LRD In order to maximize self mobility  2) Interventions:  PT Gait Training, PT Therapeutic Exercises, PT Neuro Re-Ed/Balance, PT Therapeutic Activity, PT Manual Therapy, and PT Evaluation                Problem: PT-Long Term Goals     Dates: Start: 01/14/21       Goal: LTG-By discharge, patient will propel wheelchair     Dates: Start: 01/14/21       Description: 1) Individualized goal:  150 ft At mod I indoor and outdoor in order to improve activity tolerance propelling himself around facility  2) Interventions:  PT Gait Training, PT Therapeutic Exercises, PT Neuro Re-Ed/Balance, PT Therapeutic Activity, PT Manual Therapy, and PT Evaluation          Goal: LTG-By discharge, patient will transfer one surface to another     Dates: Start: 01/14/21       Description: 1) Individualized goal:  At mod I with LRD In order to maximize self mobility  2) Interventions:  PT Gait Training, PT Therapeutic Exercises, PT Neuro Re-Ed/Balance, PT Therapeutic Activity, PT Manual Therapy, and PT Evaluation          Goal: LTG-By discharge, patient will transfer in/out of a car     Dates: Start: 01/14/21       Description: 1) Individualized goal:  At SPV with LRD In order to maximize self mobility  2) Interventions:  PT Gait Training, PT Therapeutic Exercises, PT Neuro Re-Ed/Balance, PT Therapeutic Activity, PT Manual Therapy, and PT Evaluation

## 2021-01-14 NOTE — ASSESSMENT & PLAN NOTE
Thought to be 2nd to palacios trauma  Wound Cx: show yeast  S/P Diflucan  Repeat UA: negative  S/P Diflucan for suspected balanitis (1/27)

## 2021-01-14 NOTE — ASSESSMENT & PLAN NOTE
Has a recent hx -- likely 2nd to kidney disease  Recent surgery likely a component  Hb stable: 8.8  Fe: 65, sats 35%  B12: 852  Folate: 8.5  FOB (+)  On Epogen  On PPI bid  Suggest GI F/U  Monitor

## 2021-01-14 NOTE — CARE PLAN
Problem: Urinary Elimination:  Goal: Ability to reestablish a normal urinary elimination pattern will improve  Outcome: PROGRESSING SLOWER THAN EXPECTED  Note: Pt with PVRs > 400ml. Pt refuses to be straight cathed.

## 2021-01-14 NOTE — CONSULTS
DATE OF SERVICE:  1/14/2020    REQUESTING PHYSICIAN:  Lata Goodman MD    CHIEF COMPLAINT / REASON FOR CONSULTATION:   Hypertension  Afib  Diabetes  Leukocytosis  Penile wound    HISTORY OF PRESENT ILLNESS:  This is a 76 y/o male with a PMH significant for hypertension, afib, diabetes, ESRD on HD, peripheral artery disease, CAD, and hyperthyroidism hyperlipidemia who had a history of a right diabetic foot infection that had an amputation of the toes on 11/19/20.  This was not healing and so he was admitted to the hospital on 12/31/20 for an elective right below the knee amputation by Shane.  Postoperatively patient had encephalopathy, was treated for pneumonia with IV Unasyn and then Zosyn, required transfusion for acute blood loss anemia, and also had acute urinary retention requiring catheter placement (that was reported as somewhat traumatic.  He continued on his HD days Tuesday Thursday Saturday.  Patient had several Covid testings that were all neg with the last one being on 1/11.  Pt has not yet been started on Eliquis which was his home anticoagulation.     Per Physiatry note: Pt reports that he was upset his oral intake was limited at the acute hospital after a pill got stuck 1 day.  He was unable to have thin liquids or any food he reports for 5 days which she did not think was necessary.  He also confirms that he was confused at the other hospital postoperatively, and that he got angry because he really believed things were happening to him that the staff assured him were not.  He states he was angry at their disbelief.  He denies having any more confusion.     Because of the patient's weakness and debility, Rehab was consulted, evaluated the patient, and was deemed a good Rehab candidate.  The patient was transferred over to the Rehab facility on 1/13/2020.      The patient denies fever, chills, nausea, vomiting, headaches, blurry vision, or chest pain.    REVIEW OF SYSTEMS: All review of systems  "are negative pre AMA and CMS criteria   except for that stated in the HPI.    PAST MEDICAL HISTORY:  Past Medical History:   Diagnosis Date   • Arrhythmia     hx a fib   • Arthritis 12/29/2020    knees, ankles, back   • CAD (coronary artery disease)     stents   • Chronic anticoagulation 3/26/2020   • Chronic kidney disease (CKD), stage V (HCC)    • Congestive heart failure (HCC)     hx of   • Dental disorder 12/29/2020    partial upper   • Diabetes    • Hemodialysis patient (HCC)     Tuesday, Thursday, Saturday   • Hypertension    • Kidney failure    • Myocardial infarct (HCC)     ? 2019    • Osteoarthritis    • Peripheral neuropathy    • Pneumonia     per hx   • Sleep apnea     does not use cpap anymore       PAST SURGICAL HISTORY:  Past Surgical History:   Procedure Laterality Date   • KNEE AMPUTATION BELOW Right 12/31/2020    Procedure: AMPUTATION, BELOW KNEE ...;  Surgeon: Leobardo Lynn M.D.;  Location: [facility];  Service: Orthopedics   • TOE AMPUTATION Right 11/16/2020    Procedure: AMPUTATION, TOE GREAT;  Surgeon: Leobardo Lynn M.D.;  Location: [facility];  Service: Orthopedics   • TOE AMPUTATION Left 5/17/2020    Procedure: AMPUTATION, TOE GREAT;  Surgeon: Leobardo Lynn M.D.;  Location: [facility];  Service: Orthopedics   • TENOTOMY Left 5/17/2020    Procedure: FLEXOR TENOTOMIES, TWO-FIVE TOES;  Surgeon: Leobardo Lynn M.D.;  Location: [facility];  Service: Orthopedics   • APPENDECTOMY     • OTHER CARDIAC SURGERY      stents x1   • OTHER ORTHOPEDIC SURGERY      knee surgery       Allergies   Allergen Reactions   • Mircera [Methoxy Polyethylene Glycol-Epoetin Beta] Hives   • Other Drug      \"Opiods\" caused  hallucinations       CURRENT MEDICATIONS:    Current Facility-Administered Medications:   •  calcium acetate  •  potassium chloride SA  •  tamsulosin  •  lidocaine  •  hydrOXYzine HCl  •  melatonin  •  Respiratory Therapy Consult  •  Pharmacy Consult " Request  •  acetaminophen  •  lactulose  •  docusate sodium  •  fleet  •  artificial tears  •  benzocaine-menthol  •  mag hydrox-al hydrox-simeth  •  ondansetron **OR** ondansetron  •  traZODone  •  sodium chloride  •  traMADol  •  insulin regular **AND** POC Blood Glucose **AND** NOTIFY MD and PharmD **AND** dextrose 50%  •  atorvastatin  •  calcitRIOL  •  epoetin  •  methimazole  •  methimazole  •  metoprolol  •  omeprazole  •  oxyCODONE immediate-release  •  senna-docusate **AND** magnesium hydroxide **AND** bisacodyl  •  apixaban    Social History     Socioeconomic History   • Marital status:      Spouse name: Not on file   • Number of children: Not on file   • Years of education: Not on file   • Highest education level: Some college, no degree   Occupational History   • Not on file   Social Needs   • Financial resource strain: Not hard at all   • Food insecurity     Worry: Never true     Inability: Never true   • Transportation needs     Medical: Yes     Non-medical: Yes   Tobacco Use   • Smoking status: Former Smoker     Packs/day: 1.00     Years: 55.00     Pack years: 55.00     Types: Cigarettes     Quit date: 2014     Years since quittin.9   • Smokeless tobacco: Never Used   Substance and Sexual Activity   • Alcohol use: Not Currently     Frequency: Never     Binge frequency: Never   • Drug use: No   • Sexual activity: Not on file   Lifestyle   • Physical activity     Days per week: 0 days     Minutes per session: 0 min   • Stress: To some extent   Relationships   • Social connections     Talks on phone: More than three times a week     Gets together: More than three times a week     Attends Faith service: Never     Active member of club or organization: No     Attends meetings of clubs or organizations: Never     Relationship status:    • Intimate partner violence     Fear of current or ex partner: Not on file     Emotionally abused: Not on file     Physically abused: Not on  file     Forced sexual activity: Not on file   Other Topics Concern   • Not on file   Social History Narrative   • Not on file       FAMILY HISTORY:  was reviewed and is not pertinent to this consultation.    PHYSICAL EXAMINATION:  VITAL SIGNS:  Temp is 97.8, blood pressure is 125/52 --> 174/68, heart rate is 68, respiratory rate is 18.  GENERAL:  Patient was lying in bed in no distress.  HEENT:  Pupils were equal, round and reactive to light and accomodation.  Oral mucosa was pink and moist.  NECK:  Soft.  Supple.  No JVD.  HEART:  Regular rate and rhythm.  Normal S1 and S2.  No murmurs were appreciated.  LUNGS:  Are clear to auscultation bilaterally.  ABDOMEN:  Soft, non tender, non distended.  Bowels sound were positive in all four quadrants.  EXTREMITIES:  No clubbing, cyanosis.  There was no lower extremity edema.  NEUROLOGIC:  Cranial nerves two through twelve were grossly intact.  GENITALIA:  There is a small wound at the meatus, no discharge noted.    LABS:  Lab Results   Component Value Date/Time    SODIUM 133 (L) 01/14/2021 05:17 AM    POTASSIUM 3.1 (L) 01/14/2021 05:17 AM    CHLORIDE 95 (L) 01/14/2021 05:17 AM    CO2 26 01/14/2021 05:17 AM    GLUCOSE 142 (H) 01/14/2021 05:17 AM    BUN 27 (H) 01/14/2021 05:17 AM    CREATININE 6.59 (HH) 01/14/2021 05:17 AM      Lab Results   Component Value Date/Time    WBC 9.8 01/14/2021 05:17 AM    RBC 2.80 (L) 01/14/2021 05:17 AM    HEMOGLOBIN 8.7 (L) 01/14/2021 05:17 AM    HEMATOCRIT 27.9 (L) 01/14/2021 05:17 AM    MCV 99.6 (H) 01/14/2021 05:17 AM    MCH 31.1 01/14/2021 05:17 AM    MCHC 31.2 (L) 01/14/2021 05:17 AM    MPV 8.9 (L) 01/14/2021 05:17 AM    NEUTSPOLYS 71.60 01/14/2021 05:17 AM    LYMPHOCYTES 14.70 (L) 01/14/2021 05:17 AM    MONOCYTES 8.70 01/14/2021 05:17 AM    EOSINOPHILS 0.10 01/14/2021 05:17 AM    BASOPHILS 0.70 01/14/2021 05:17 AM    ANISOCYTOSIS 1+ 01/09/2021 08:39 AM      Lab Results   Component Value Date/Time    PROTHROMBTM 17.5 (H) 11/16/2020 07:15  PM    INR 1.39 (H) 11/16/2020 07:15 PM        Anemia  Has a recent hx -- likely 2nd to kidney disease  Recent surgery likely a component  Hb stable: 8.7 (1/14)  On Epogen  Will get Fe, B12, and folate levels at the next blood draw    Dyslipidemia  On Lipitor    End stage renal disease on dialysis (HCC)  On HD (T, Th, Sat)    Essential hypertension  BP was ok but castillo up this am (1/14)  On Lopressor: 37.5 mg bid  Will monitor another day before adjusting meds    Hyperthyroidism  TSH: wnl (1/6)  On Tapazole    Paroxysmal A-fib (HCC)  HR ok  On Lopressor: 37.5 mg bid  Monitor    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)  2nd to PVD & diabetes with non-healing wound and reported gangrene  S/P right BKA (12/31)    Type 2 diabetes mellitus with kidney complication, with long-term current use of insulin ([facility])  Hba1c: 7.8 (11/14/20)  BS: 112-175 (hit 207 x 1)  Currently not on any diabetic meds  Note: home meds were Lantus 5 units qhs  Will monitor another day and consider starting low dose Lantus    Hypokalemia  K+: 3.1 (1/14)  Will give KCL 40 meq x 1 dose (1/14)  Monitor    Leukocytosis  S/P leukocytosis: WBC's: 11.8 --> 9.8 (1/14)  Has been afebrile  Denies cough  Has a small, nonpainful penile wound infection near the opening of the meatus         -- reportedly had a little trauma at [facility] when placing palacios         -- appears to be healing (compared to recent photo)  CXR (1/11): stable interstitial opacity or fibrotic change in the left lower lobe  Procalcitonin: 1.65 (1/8) --> 30.5 (1/11) --> ? etiology -- ? residual from recent PNA  Was recently treated at [facility] for PNA with Unasyn and then Zosyn (? duration)  Syphilis neg (11/16/20)  HIV neg (11/16/20)  F/U penile wound cultures  Will repeat Procalcitonin levels  Will check U/A  Monitor      This case has been discussed with the attending Physiatrist.    Thank you for the consultation.  Will follow the patient with you.

## 2021-01-14 NOTE — THERAPY
"Speech Language Pathology   Initial Assessment     Patient Name: Luis Sepulveda  AGE:  75 y.o., SEX:  male  Medical Record #: [id number]  Today's Date: 1/14/2021     Subjective    Per chart review - \"The patient is a 75 y.o. male with a past medical history of diabetes, end-stage renal disease on hemodialysis, peripheral artery disease, coronary artery disease, paroxysmal atrial fibrillation, hyperthyroidism, hyperlipidemia; now admitted for acute inpatient rehabilitation with severe functional debility after he required right below the knee amputation.       On admission the patient and medical record report, patient had a history of a right diabetic foot infection that had an amputation of the toe on 11/19/20.  This was not healing and so he was admitted to the hospital on 12/31/20 for an elective right below the knee amputation that occurred with Dr. Lynn.  Postoperatively patient had encephalopathy, was treated for pneumonia with IV Zosyn, required transfusion for acute blood loss anemia, and also had acute urinary retention requiring catheter placement.  He continued on his hemodialysis days Tuesday Thursday Saturday.  Patient had Covid testing which was negative on 1/4 and 1/22.  Surgery deferred DVT prophylaxis to the primary team.  Patient has not yet been started on Eliquis which was his home anticoagulation.\"     Objective       01/14/21 0903   Prior Living Situation   Prior Services Skilled Home Health Services   Housing / Facility 1 Story House   Lives with - Patient's Self Care Capacity Adult Children;Spouse   Prior Level Of Function   Communication Within Functional Limits   Hearing Within Functional Limits for Evaluation   Hearing Aid None   Vision Reading    Education Some College or Trade School   Occupation (Pre-Hospital Vocational) Unable To Determine At This Time   Cognitive Pattern Assessment   Cognitive Pattern Assessment Used BIMS   Brief Interview for Mental Status (BIMS)   Repetition " "of Three Words (First Attempt) 3   Temporal Orientation: Year Correct   Temporal Orientation: Month Accurate within 5 days   Temporal Orientation: Day Correct   Recall: \"Sock\" Yes, no cue required   Recall: \"Blue\" Yes, no cue required   Recall: \"Bed\" Yes, after cueing (\"a piece of furniture\")   BIMS Summary Score 14   Functional Level of Assist   Comprehension Modified Independent   Comprehension Description Glasses   Expression Modified Independent   Expression Description Verbal cueing   Social Interaction Supervision   Social Interaction Description Verbal cues   Problem Solving Minimal Assist   Problem Solving Description Therapy schedule;Verbal cueing;Increased time   Memory Maximal Assist   Memory Description Verbal cueing;Therapy schedule;Increased time;Supervision   Outcome Measures   Outcome Measures Utilized SCCAN   SCCAN (Scales of Cognitive and Communicative Ability for Neurorehabilitation)   Oral Expression - Raw Score 18   Oral Expression - Scale Performance Score 95   Orientation - Raw Score 12   Orientation - Scale Performance Score 100   Memory - Raw Score 11   Memory - Scale Performance Score 58   Speech Comprehension - Raw Score 13   Speech Comprehension - Scale Performance Score 100   Reading Comprehension - Raw Score 11   Reading Comprehension - Scale Performance Score 92   Writing - Raw Score 7   Writing - Scale Performance Score 100   Attention - Raw Score 14   Attention - Scale Performance Score 88   Problem Solving - Raw Score 21   Problem Solving - Scale Performance Score 91   SCCAN Total Raw Score 83   SCCAN Degree of Severity Mild Impairment   Problem List   Problem List Cognitive-Linguistic Deficits;Attention Deficit;Memory Deficit;Executive Function Deficit   Current Discharge Plan   Current Discharge Plan Return to Prior Living Situation   Benefit   Therapy Benefit Patient would benefit from Inpatient Rehab Speech-Language Pathology to address above identified deficits. "   Interdisciplinary Plan of Care Collaboration   IDT Collaboration with  Nursing;Physician   Patient Position at End of Therapy Seated;Chair Alarm On;Call Light within Reach;Tray Table within Reach;Phone within Reach   Collaboration Comments PLOF and CLOF   Speech Language Pathologist Assigned   Assigned SLP / Extension LW 60 SPLIT OK   SLP Total Time Spent   SLP Individual Total Time Spent (Mins) 60   Evaluation Charges   Charges Yes   SLP Speech Language Evaluation Speech Sound Language Comprehension       Assessment    Patient is 75 y.o. male with a diagnosis of BKA and encephalopathy.  Additional factors influencing patient status/progress (ie: cognitive factors, co-morbidities, social support, etc): pt reports being indep and able to care for self prior. Pt reported noted cognitive decline with awareness of slower processing speed and difficulty with recall. Indep managed meds and finances prior, was not driving.     Cognitive assessment completed at this time with use of SCCAN. Pt noted to be hyper verbose and required redirection. Pt completed test with a raw score of 83 achieved which indicates MILD cognitive impairments. Pt with moderate to severe deficit in area of short term memory recall (verbal) and mild deficit in area of attention. Pt very aware of cognitive deficits and often demonstrating frequent negative self talk. Pt would benefit from brief cognitive intervention to address functional memory strategies, ensure attention and understanding of current medications with goal for pt to remain indep as for that was his PLOF.     Plan  Recommend Speech Therapy 30-60 minutes per day 5-6 days per week for 1 weeks for the following treatments:  SLP Self Care / ADL Training , SLP Cognitive Skill Development and SLP Group Treatment.    Goals:  Long term and short term goals have been discussed with patient and they are in agreement.    Speech Therapy Problems     Problem: Memory STGs     Dates: Start:  01/14/21       Goal: STG-Within one week, patient will     Dates: Start: 01/14/21       Description: 1) Individualized goal:  complete verbal recall tasks with brief (5-10 minute) delay with 70% accuracy provided MIN cues.   2) Interventions:  SLP Self Care / ADL Training , SLP Cognitive Skill Development, and SLP Group Treatment                Problem: Problem Solving STGs     Dates: Start: 01/14/21       Goal: STG-Within one week, patient will     Dates: Start: 01/14/21       Description: 1) Individualized goal:  complete complex attention tasks involving medication management with 80% accuracy provided SPV.   2) Interventions:  SLP Self Care / ADL Training , SLP Cognitive Skill Development, and SLP Group Treatment                Problem: Speech/Swallowing LTGs     Dates: Start: 01/14/21       Goal: LTG-By discharge, patient will     Dates: Start: 01/14/21       Description: 1) Individualized goal:  complete functional problem solving and recall information with 80% accuracy and MOD I.   2) Interventions:  SLP Self Care / ADL Training , SLP Cognitive Skill Development, and SLP Group Treatment

## 2021-01-14 NOTE — ASSESSMENT & PLAN NOTE
BP ok but occ dips a little lower recently  Off Lopressor  Currently not on any hypertensive meds  Note: on Flomax  Cont to monitor

## 2021-01-14 NOTE — PROGRESS NOTES
"Rehab Progress Note     Date of Service: 1/14/2021  Chief Complaint: Follow-up BKA    Interval Events (Subjective)    Patient seen and evaluated today in his room.  He reports some shooting phantom pain in his right leg.  He reports he slept pretty good last night as it is quieter over here at the hospital.  He wants to know why everyone is still concerned about his urinary retention.  We discussed the need to empty his bladder with a catheter so that he does not get a kidney infection.  Patient also is frustrated with our focus on his bowel movements.  He reports at home he only goes every 4 to 5 days.  He is currently denying any abdominal pain.  He agrees that he likely has decreased motility due to his longstanding history of diabetes.  Speech therapy did a cognitive evaluation today and found him to have just mild memory deficits.  Hospitalist was consulted for his medical comorbidities.  Patient to have dialysis this afternoon.  Patient has no other concerns or complaints.  Media images of his amputation site as well as some penile discharge were reviewed.    Objective:  VITAL SIGNS: /68   Pulse 68   Temp 36.6 °C (97.8 °F) (Oral)   Resp 18   Ht 1.651 m (5' 5\")   Wt 73 kg (160 lb 15 oz)   SpO2 100%   BMI 26.78 kg/m²   Gen: alert, no apparent distress  Msk: Right transtibial amputation, wrapped    Recent Results (from the past 72 hour(s))   ACCU-CHEK GLUCOSE    Collection Time: 01/11/21  5:17 PM   Result Value Ref Range    Glucose - Accu-Ck 158 (H) 65 - 99 mg/dL   ACCU-CHEK GLUCOSE    Collection Time: 01/11/21  8:12 PM   Result Value Ref Range    Glucose - Accu-Ck 151 (H) 65 - 99 mg/dL   ACCU-CHEK GLUCOSE    Collection Time: 01/12/21  8:39 AM   Result Value Ref Range    Glucose - Accu-Ck 112 (H) 65 - 99 mg/dL   CBC WITH DIFFERENTIAL    Collection Time: 01/12/21  9:30 AM   Result Value Ref Range    WBC 11.1 (H) 4.8 - 10.8 K/uL    RBC 2.77 (L) 4.70 - 6.10 M/uL    Hemoglobin 8.8 (L) 14.0 - 18.0 g/dL    " Hematocrit 27.1 (L) 42.0 - 52.0 %    MCV 97.8 81.4 - 97.8 fL    MCH 31.8 27.0 - 33.0 pg    MCHC 32.5 (L) 33.7 - 35.3 g/dL    RDW 54.5 (H) 35.9 - 50.0 fL    Platelet Count 356 164 - 446 K/uL    MPV 8.9 (L) 9.0 - 12.9 fL    Neutrophils-Polys 80.10 (H) 44.00 - 72.00 %    Lymphocytes 9.80 (L) 22.00 - 41.00 %    Monocytes 7.80 0.00 - 13.40 %    Eosinophils 0.00 0.00 - 6.90 %    Basophils 0.50 0.00 - 1.80 %    Immature Granulocytes 1.80 (H) 0.00 - 0.90 %    Nucleated RBC 0.00 /100 WBC    Neutrophils (Absolute) 8.87 (H) 1.82 - 7.42 K/uL    Lymphs (Absolute) 1.09 1.00 - 4.80 K/uL    Monos (Absolute) 0.86 (H) 0.00 - 0.85 K/uL    Eos (Absolute) 0.00 0.00 - 0.51 K/uL    Baso (Absolute) 0.06 0.00 - 0.12 K/uL    Immature Granulocytes (abs) 0.20 (H) 0.00 - 0.11 K/uL    NRBC (Absolute) 0.00 K/uL   Comp Metabolic Panel    Collection Time: 01/12/21  9:30 AM   Result Value Ref Range    Sodium 130 (L) 135 - 145 mmol/L    Potassium 3.5 (L) 3.6 - 5.5 mmol/L    Chloride 94 (L) 96 - 112 mmol/L    Co2 23 20 - 33 mmol/L    Anion Gap 13.0 7.0 - 16.0    Glucose 121 (H) 65 - 99 mg/dL    Bun 39 (H) 8 - 22 mg/dL    Creatinine 7.36 (HH) 0.50 - 1.40 mg/dL    Calcium 8.9 8.5 - 10.5 mg/dL    AST(SGOT) 7 (L) 12 - 45 U/L    ALT(SGPT) <5 2 - 50 U/L    Alkaline Phosphatase 78 30 - 99 U/L    Total Bilirubin 0.3 0.1 - 1.5 mg/dL    Albumin 2.9 (L) 3.2 - 4.9 g/dL    Total Protein 5.8 (L) 6.0 - 8.2 g/dL    Globulin 2.9 1.9 - 3.5 g/dL    A-G Ratio 1.0 g/dL   ESTIMATED GFR    Collection Time: 01/12/21  9:30 AM   Result Value Ref Range    GFR If  9 (A) >60 mL/min/1.73 m 2    GFR If Non African American 7 (A) >60 mL/min/1.73 m 2   ACCU-CHEK GLUCOSE    Collection Time: 01/12/21  5:52 PM   Result Value Ref Range    Glucose - Accu-Ck 175 (H) 65 - 99 mg/dL   ACCU-CHEK GLUCOSE    Collection Time: 01/12/21  8:22 PM   Result Value Ref Range    Glucose - Accu-Ck 160 (H) 65 - 99 mg/dL   CBC WITH DIFFERENTIAL    Collection Time: 01/13/21  4:11 AM   Result  Value Ref Range    WBC 11.8 (H) 4.8 - 10.8 K/uL    RBC 2.90 (L) 4.70 - 6.10 M/uL    Hemoglobin 9.2 (L) 14.0 - 18.0 g/dL    Hematocrit 28.7 (L) 42.0 - 52.0 %    MCV 99.0 (H) 81.4 - 97.8 fL    MCH 31.7 27.0 - 33.0 pg    MCHC 32.1 (L) 33.7 - 35.3 g/dL    RDW 55.6 (H) 35.9 - 50.0 fL    Platelet Count 324 164 - 446 K/uL    MPV 8.8 (L) 9.0 - 12.9 fL    Neutrophils-Polys 75.80 (H) 44.00 - 72.00 %    Lymphocytes 11.70 (L) 22.00 - 41.00 %    Monocytes 7.60 0.00 - 13.40 %    Eosinophils 0.10 0.00 - 6.90 %    Basophils 0.70 0.00 - 1.80 %    Immature Granulocytes 4.10 (H) 0.00 - 0.90 %    Nucleated RBC 0.30 /100 WBC    Neutrophils (Absolute) 8.96 (H) 1.82 - 7.42 K/uL    Lymphs (Absolute) 1.38 1.00 - 4.80 K/uL    Monos (Absolute) 0.90 (H) 0.00 - 0.85 K/uL    Eos (Absolute) 0.01 0.00 - 0.51 K/uL    Baso (Absolute) 0.08 0.00 - 0.12 K/uL    Immature Granulocytes (abs) 0.49 (H) 0.00 - 0.11 K/uL    NRBC (Absolute) 0.03 K/uL   Comp Metabolic Panel    Collection Time: 01/13/21  4:11 AM   Result Value Ref Range    Sodium 135 135 - 145 mmol/L    Potassium 3.3 (L) 3.6 - 5.5 mmol/L    Chloride 98 96 - 112 mmol/L    Co2 26 20 - 33 mmol/L    Anion Gap 11.0 7.0 - 16.0    Glucose 126 (H) 65 - 99 mg/dL    Bun 21 8 - 22 mg/dL    Creatinine 4.96 (H) 0.50 - 1.40 mg/dL    Calcium 8.9 8.5 - 10.5 mg/dL    AST(SGOT) 8 (L) 12 - 45 U/L    ALT(SGPT) <5 2 - 50 U/L    Alkaline Phosphatase 81 30 - 99 U/L    Total Bilirubin 0.4 0.1 - 1.5 mg/dL    Albumin 3.2 3.2 - 4.9 g/dL    Total Protein 6.0 6.0 - 8.2 g/dL    Globulin 2.8 1.9 - 3.5 g/dL    A-G Ratio 1.1 g/dL   MAGNESIUM    Collection Time: 01/13/21  4:11 AM   Result Value Ref Range    Magnesium 2.0 1.5 - 2.5 mg/dL   PHOSPHORUS    Collection Time: 01/13/21  4:11 AM   Result Value Ref Range    Phosphorus 1.8 (L) 2.5 - 4.5 mg/dL   ESTIMATED GFR    Collection Time: 01/13/21  4:11 AM   Result Value Ref Range    GFR If  14 (A) >60 mL/min/1.73 m 2    GFR If Non  11 (A) >60  mL/min/1.73 m 2   ACCU-CHEK GLUCOSE    Collection Time: 01/13/21  8:25 AM   Result Value Ref Range    Glucose - Accu-Ck 122 (H) 65 - 99 mg/dL   ACCU-CHEK GLUCOSE    Collection Time: 01/13/21  5:28 PM   Result Value Ref Range    Glucose - Accu-Ck 146 (H) 65 - 99 mg/dL   ACCU-CHEK GLUCOSE    Collection Time: 01/13/21  8:11 PM   Result Value Ref Range    Glucose - Accu-Ck 207 (H) 65 - 99 mg/dL   CBC with Differential    Collection Time: 01/14/21  5:17 AM   Result Value Ref Range    WBC 9.8 4.8 - 10.8 K/uL    RBC 2.80 (L) 4.70 - 6.10 M/uL    Hemoglobin 8.7 (L) 14.0 - 18.0 g/dL    Hematocrit 27.9 (L) 42.0 - 52.0 %    MCV 99.6 (H) 81.4 - 97.8 fL    MCH 31.1 27.0 - 33.0 pg    MCHC 31.2 (L) 33.7 - 35.3 g/dL    RDW 57.1 (H) 35.9 - 50.0 fL    Platelet Count 337 164 - 446 K/uL    MPV 8.9 (L) 9.0 - 12.9 fL    Neutrophils-Polys 71.60 44.00 - 72.00 %    Lymphocytes 14.70 (L) 22.00 - 41.00 %    Monocytes 8.70 0.00 - 13.40 %    Eosinophils 0.10 0.00 - 6.90 %    Basophils 0.70 0.00 - 1.80 %    Immature Granulocytes 4.20 (H) 0.00 - 0.90 %    Nucleated RBC 0.00 /100 WBC    Neutrophils (Absolute) 7.01 1.82 - 7.42 K/uL    Lymphs (Absolute) 1.44 1.00 - 4.80 K/uL    Monos (Absolute) 0.85 0.00 - 0.85 K/uL    Eos (Absolute) 0.01 0.00 - 0.51 K/uL    Baso (Absolute) 0.07 0.00 - 0.12 K/uL    Immature Granulocytes (abs) 0.41 (H) 0.00 - 0.11 K/uL    NRBC (Absolute) 0.00 K/uL   Comp Metabolic Panel (CMP)    Collection Time: 01/14/21  5:17 AM   Result Value Ref Range    Sodium 133 (L) 135 - 145 mmol/L    Potassium 3.1 (L) 3.6 - 5.5 mmol/L    Chloride 95 (L) 96 - 112 mmol/L    Co2 26 20 - 33 mmol/L    Anion Gap 12.0 7.0 - 16.0    Glucose 142 (H) 65 - 99 mg/dL    Bun 27 (H) 8 - 22 mg/dL    Creatinine 6.59 (HH) 0.50 - 1.40 mg/dL    Calcium 9.0 8.5 - 10.5 mg/dL    AST(SGOT) 12 12 - 45 U/L    ALT(SGPT) <5 2 - 50 U/L    Alkaline Phosphatase 85 30 - 99 U/L    Total Bilirubin 0.3 0.1 - 1.5 mg/dL    Albumin 3.2 3.2 - 4.9 g/dL    Total Protein 6.2 6.0 - 8.2  g/dL    Globulin 3.0 1.9 - 3.5 g/dL    A-G Ratio 1.1 g/dL   Magnesium    Collection Time: 01/14/21  5:17 AM   Result Value Ref Range    Magnesium 1.9 1.5 - 2.5 mg/dL   Vitamin D, 25-hydroxy (blood)    Collection Time: 01/14/21  5:17 AM   Result Value Ref Range    25-Hydroxy   Vitamin D 25 48 30 - 100 ng/mL   Phosphorus    Collection Time: 01/14/21  5:17 AM   Result Value Ref Range    Phosphorus 2.5 2.5 - 4.5 mg/dL   ESTIMATED GFR    Collection Time: 01/14/21  5:17 AM   Result Value Ref Range    GFR If African American 10 (A) >60 mL/min/1.73 m 2    GFR If Non  8 (A) >60 mL/min/1.73 m 2   ACCU-CHEK GLUCOSE    Collection Time: 01/14/21  8:09 AM   Result Value Ref Range    Glucose - Accu-Ck 153 (H) 65 - 99 mg/dL   ACCU-CHEK GLUCOSE    Collection Time: 01/14/21 11:13 AM   Result Value Ref Range    Glucose - Accu-Ck 133 (H) 65 - 99 mg/dL       Current Facility-Administered Medications   Medication Frequency   • calcium acetate (PHOS-LO) 667 MG tablet 667 mg TID AC   • potassium chloride SA (Kdur) tablet 40 mEq Once   • tamsulosin (FLOMAX) capsule 0.4 mg QHS   • lidocaine 2 % jelly PRN   • heparin injection 1,800 Units DIALYSIS PRN   • hydrOXYzine HCl (ATARAX) tablet 50 mg Q6HRS PRN   • melatonin tablet 3 mg HS PRN   • Respiratory Therapy Consult Continuous RT   • Pharmacy Consult Request ...Pain Management Review 1 Each PHARMACY TO DOSE   • acetaminophen (Tylenol) tablet 650 mg Q4HRS PRN   • lactulose 20 GM/30ML solution 30 mL QDAY PRN   • docusate sodium (ENEMEEZ) enema 283 mg QDAY PRN   • fleet enema 133 mL QDAY PRN   • artificial tears ophthalmic solution 1 Drop PRN   • benzocaine-menthol (CEPACOL) lozenge 1 Lozenge Q2HRS PRN   • mag hydrox-al hydrox-simeth (MAALOX PLUS ES or MYLANTA DS) suspension 20 mL Q2HRS PRN   • ondansetron (ZOFRAN ODT) dispertab 4 mg 4X/DAY PRN    Or   • ondansetron (ZOFRAN) syringe/vial injection 4 mg 4X/DAY PRN   • traZODone (DESYREL) tablet 50 mg QHS PRN   • sodium chloride  (OCEAN) 0.65 % nasal spray 2 Spray PRN   • traMADol (ULTRAM) 50 MG tablet 50 mg Q4HRS PRN   • insulin regular (HumuLIN R,NovoLIN R) injection 4X/DAY ACHS    And   • dextrose 50% (D50W) injection 50 mL Q15 MIN PRN   • atorvastatin (LIPITOR) tablet 40 mg Q EVENING   • calcitRIOL (ROCALTROL) capsule 0.25 mcg DAILY   • epoetin (Retacrit) injection (Dialysis use only) 10,000 Units TUE+THU+SAT   • methimazole (TAPAZOLE) tablet 5 mg Q EVENING   • methimazole (TAPAZOLE) tablet 7.5 mg QAM   • metoprolol (LOPRESSOR) tablet 37.5 mg TWICE DAILY   • omeprazole (PRILOSEC) capsule 20 mg DAILY   • oxyCODONE immediate-release (ROXICODONE) tablet 5 mg Q4HRS PRN   • senna-docusate (PERICOLACE or SENOKOT S) 8.6-50 MG per tablet 2 Tab BID    And   • magnesium hydroxide (MILK OF MAGNESIA) suspension 30 mL QDAY PRN    And   • bisacodyl (DULCOLAX) suppository 10 mg QDAY PRN   • apixaban (ELIQUIS) tablet 2.5 mg BID       Orders Placed This Encounter   Procedures   • Diet Order Diet: Renal; Second Modifier: (optional): Consistent CHO (Diabetic)     Standing Status:   Standing     Number of Occurrences:   1     Order Specific Question:   Diet:     Answer:   Renal [8]     Order Specific Question:   Second Modifier: (optional)     Answer:   Consistent CHO (Diabetic) [4]       Assessment:  Active Hospital Problems    Diagnosis   • *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)   • Leukocytosis   • Urinary retention   • Paroxysmal A-fib (HCC)   • End stage renal disease on dialysis (HCC)   • Type 2 diabetes mellitus with kidney complication, with long-term current use of insulin (HCC)   • Essential hypertension   • Anemia   • Hyperthyroidism   • Dyslipidemia   • Hypokalemia   • Impaired mobility and ADLs     This patient is a 75 y.o. male admitted for acute inpatient rehabilitation with 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).    Medical Decision Making and Plan:    Right CLOVIS  12/31   [facility]  Continue full rehab program  PT/OT/SLP, 1 hr each discipline, 5 days per week  Outpatient follow-up for suture removal after discharge     Encephalopathy, resolved  Speech therapy for cognitive assessment  Currently only with mild memory deficits     Urinary retention, continues  Patient oliguric at baseline, but has only been on dialysis for one year  Has high residuals, refusing caths  Start Flomax 0.4 mg 1/14  PRN lidocaine jelly for caths     Appreciate the assistance of the hospitalist with his medical comorbidities:     End-stage renal disease on hemodialysis  Consult nephrology  Continue calcitriol  Continue PhosLo  Continue Retacrit     Diabetes with hyperglycemia  Sliding scale insulin     Peripheral artery disease  Restarted Eliquis, renal dosing  Continue statin     Coronary artery disease  With history of stents  Continue statin     Paroxysmal atrial fibrillation  Restarted Eliquis, renal dosing  Continue metoprolol     Hypertension  Continue metoprolol     Hyperlipidemia  Continue statin     Hyperthyroidism  Continue methimazole     GERD  Continue omeprazole     Anemia  Likely of chronic renal disease  Continue Retacrit     Leukocytosis  Recently treated with IV antibiotics for pneumonia  Recheck chest x-ray and urinalysis    Elevated procalcitonin  Recently treated with IV antibiotics for pneumonia, unclear etiology  Recheck in morning    Penile discharge  Culture pending    Total time:  35 minutes.  I spent greater than 50% of the time for patient care, counseling, and coordination on this date, including patient face-to face time, unit/floor time with review of records/pertinent lab data and studies, as well as discussing diagnostic evaluation/work up, planned therapeutic interventions, and future disposition of care, as per the interval events/subjective and the assessment and plan as noted above.      Lata Goodman M.D.   Physical Medicine and Rehabilitation

## 2021-01-15 NOTE — THERAPY
"Occupational Therapy  Daily Treatment     Patient Name: Luis Sepulveda  Age:  75 y.o., Sex:  male  Medical Record #: [id number]  Today's Date: 1/15/2021     Precautions  Precautions: (P) Fall Risk, Non Weight Bearing Right Lower Extremity  Comments: (P) Dialysis Tues, Thurs, Sat; L arm fistula; previously used port         Subjective    \"I would like to take a shower.\"     Objective       01/15/21 1031   Precautions   Precautions Fall Risk;Non Weight Bearing Right Lower Extremity   Comments Dialysis Tues, Thurs, Sat; L arm fistula; previously used port   Functional Level of Assist   Grooming Modified Independent   Grooming Description Seated in wheelchair at sink   Bathing Supervision  (setup  waterproof resid. limb and IV site. )   Bathing Description   (side to side lean  to wash buttocks )   Upper Body Dressing Modified Independent  (doff/don pulll over shirt at w/c level )   Lower Body Dressing Maximal Assist  ( assist to don  left sock  right    pants min assist)   Toileting Stand by Assist   Bed, Chair, Wheelchair Transfer Minimal Assist  (cues for technique )   Toilet Transfers Contact Guard Assist   Toilet Transfer Description Grab bar   Tub / Shower Transfers Minimal Assist   Tub Shower Transfer Description Grab bar  (cues for technique )   Interdisciplinary Plan of Care Collaboration   IDT Collaboration with  Physical Therapist;Other (See Comments)  (prosthetist )   Patient Position at End of Therapy Seated  (at sink  grooming )   Collaboration Comments  prosthetist  brought  in  smaller   for  right LE   donned  postshower  with no complaints.    OT Total Time Spent   OT Individual Total Time Spent (Mins) 60   OT Charge Group   OT Self Care / ADL 4     Issued long sponge and pt used for back  And left LE .    post shower donned underwear sitting in w/c  Stood to grab bar and pulled up with SBA     and immobilizer applied in bed for best fit  . Patient  Donned  Pants sitting " edge of bed with SBA   And assist from therapist to stand and pull up .     Assessment     motivated for functional independence     Consistent cueing needed for technique with transfers     Strengths: Alert and oriented, Effective communication skills, Independent prior level of function, Making steady progress towards goals, Pleasant and cooperative, Supportive family, Willingly participates in therapeutic activities  Barriers: Decreased endurance, Fatigue, Impaired balance(safety awareness)    Plan     ADL  IAD , related mobility  Strength/endurance building       Occupational Therapy Goals     Problem: Dressing     Dates: Start: 01/14/21       Goal: STG-Within one week, patient will dress LB     Dates: Start: 01/14/21       Note:     Goal Note filed on 01/14/21 1519 by Zeina Taylor MS,OTR/L    1) Individualized Goal:  with Min A and AE as needed  2) Interventions:  OT Group Therapy, OT Self Care/ADL, OT Cognitive Skill Dev, OT Community Reintegration, OT Manual Ther Technique, OT Neuro Re-Ed/Balance, OT Therapeutic Activity, OT Evaluation, and OT Therapeutic Exercise                         Problem: Functional Transfers     Dates: Start: 01/14/21       Goal: STG-Within one week, patient will transfer to toilet     Dates: Start: 01/14/21       Note:     Goal Note filed on 01/14/21 1519 by Zeina Taylor MS,OTR/L    1) Individualized Goal:  with CGA and AE as needed  2) Interventions:  OT Group Therapy, OT Self Care/ADL, OT Cognitive Skill Dev, OT Community Reintegration, OT Manual Ther Technique, OT Neuro Re-Ed/Balance, OT Therapeutic Activity, OT Evaluation, and OT Therapeutic Exercise                   Goal: STG-Within one week, patient will transfer to tub/shower     Dates: Start: 01/14/21       Note:     Goal Note filed on 01/14/21 1519 by Zeina Taylor MS,OTR/L    1) Individualized Goal:  with CGA and AE as needed  2) Interventions:  OT Group Therapy, OT Self Care/ADL, OT Cognitive Skill Dev,  OT Community Reintegration, OT Manual Ther Technique, OT Neuro Re-Ed/Balance, OT Therapeutic Activity, OT Evaluation, and OT Therapeutic Exercise                         Problem: OT Long Term Goals     Dates: Start: 01/14/21       Goal: LTG-By discharge, patient will complete basic self care tasks     Dates: Start: 01/14/21       Note:     Goal Note filed on 01/14/21 1519 by Zeina Taylor MS,OTR/L    1) Individualized Goal:  with mod I and AE as needed  2) Interventions:  OT Group Therapy, OT Self Care/ADL, OT Cognitive Skill Dev, OT Community Reintegration, OT Manual Ther Technique, OT Neuro Re-Ed/Balance, OT Therapeutic Activity, OT Evaluation, and OT Therapeutic Exercise                   Goal: LTG-By discharge, patient will perform bathroom transfers     Dates: Start: 01/14/21       Note:     Goal Note filed on 01/14/21 1519 by Zeina Taylor MS,OTR/L    1) Individualized Goal:  with mod I and AE as needed  2) Interventions:  OT Group Therapy, OT Self Care/ADL, OT Cognitive Skill Dev, OT Community Reintegration, OT Manual Ther Technique, OT Neuro Re-Ed/Balance, OT Therapeutic Activity, OT Evaluation, and OT Therapeutic Exercise                         Problem: Toileting     Dates: Start: 01/14/21       Goal: STG-Within one week, patient will complete toileting tasks     Dates: Start: 01/14/21       Note:     Goal Note filed on 01/14/21 1519 by Zeina Taylor MS,OTR/L    1) Individualized Goal:  with Min A and AE as needed  2) Interventions:  OT Group Therapy, OT Self Care/ADL, OT Cognitive Skill Dev, OT Community Reintegration, OT Manual Ther Technique, OT Neuro Re-Ed/Balance, OT Therapeutic Activity, OT Evaluation, and OT Therapeutic Exercise

## 2021-01-15 NOTE — PROGRESS NOTES
"Rehab Progress Note     Date of Service: 1/15/2021  Chief Complaint: Follow-up BKA    Interval Events (Subjective)    Patient seen and examined today in his room.  He said dialysis went just fine yesterday.  We again discussed starting gabapentin today at the max dose he can get while on dialysis.  Patient did finally move his bowels.  He has required 1 straight catheterization.  Advised him I started Flomax.  He does report a history of enlarged prostate but that has never caused an issue before.  The prosthetist from [facility] came to see the patient today and got him an IPOP and gave him some amputation information. He has no other today.    Objective:  VITAL SIGNS: /70   Pulse 82   Temp 36.6 °C (97.9 °F) (Oral)   Resp 17   Ht 1.651 m (5' 5\")   Wt 73 kg (160 lb 15 oz)   SpO2 95%   BMI 26.78 kg/m²   Gen: alert, no apparent distress  Msk: Right transtibial amputation with IPOP    Recent Results (from the past 72 hour(s))   ACCU-CHEK GLUCOSE    Collection Time: 01/12/21  5:52 PM   Result Value Ref Range    Glucose - Accu-Ck 175 (H) 65 - 99 mg/dL   ACCU-CHEK GLUCOSE    Collection Time: 01/12/21  8:22 PM   Result Value Ref Range    Glucose - Accu-Ck 160 (H) 65 - 99 mg/dL   CBC WITH DIFFERENTIAL    Collection Time: 01/13/21  4:11 AM   Result Value Ref Range    WBC 11.8 (H) 4.8 - 10.8 K/uL    RBC 2.90 (L) 4.70 - 6.10 M/uL    Hemoglobin 9.2 (L) 14.0 - 18.0 g/dL    Hematocrit 28.7 (L) 42.0 - 52.0 %    MCV 99.0 (H) 81.4 - 97.8 fL    MCH 31.7 27.0 - 33.0 pg    MCHC 32.1 (L) 33.7 - 35.3 g/dL    RDW 55.6 (H) 35.9 - 50.0 fL    Platelet Count 324 164 - 446 K/uL    MPV 8.8 (L) 9.0 - 12.9 fL    Neutrophils-Polys 75.80 (H) 44.00 - 72.00 %    Lymphocytes 11.70 (L) 22.00 - 41.00 %    Monocytes 7.60 0.00 - 13.40 %    Eosinophils 0.10 0.00 - 6.90 %    Basophils 0.70 0.00 - 1.80 %    Immature Granulocytes 4.10 (H) 0.00 - 0.90 %    Nucleated RBC 0.30 /100 WBC    Neutrophils (Absolute) 8.96 (H) 1.82 - 7.42 K/uL    Lymphs " (Absolute) 1.38 1.00 - 4.80 K/uL    Monos (Absolute) 0.90 (H) 0.00 - 0.85 K/uL    Eos (Absolute) 0.01 0.00 - 0.51 K/uL    Baso (Absolute) 0.08 0.00 - 0.12 K/uL    Immature Granulocytes (abs) 0.49 (H) 0.00 - 0.11 K/uL    NRBC (Absolute) 0.03 K/uL   Comp Metabolic Panel    Collection Time: 01/13/21  4:11 AM   Result Value Ref Range    Sodium 135 135 - 145 mmol/L    Potassium 3.3 (L) 3.6 - 5.5 mmol/L    Chloride 98 96 - 112 mmol/L    Co2 26 20 - 33 mmol/L    Anion Gap 11.0 7.0 - 16.0    Glucose 126 (H) 65 - 99 mg/dL    Bun 21 8 - 22 mg/dL    Creatinine 4.96 (H) 0.50 - 1.40 mg/dL    Calcium 8.9 8.5 - 10.5 mg/dL    AST(SGOT) 8 (L) 12 - 45 U/L    ALT(SGPT) <5 2 - 50 U/L    Alkaline Phosphatase 81 30 - 99 U/L    Total Bilirubin 0.4 0.1 - 1.5 mg/dL    Albumin 3.2 3.2 - 4.9 g/dL    Total Protein 6.0 6.0 - 8.2 g/dL    Globulin 2.8 1.9 - 3.5 g/dL    A-G Ratio 1.1 g/dL   MAGNESIUM    Collection Time: 01/13/21  4:11 AM   Result Value Ref Range    Magnesium 2.0 1.5 - 2.5 mg/dL   PHOSPHORUS    Collection Time: 01/13/21  4:11 AM   Result Value Ref Range    Phosphorus 1.8 (L) 2.5 - 4.5 mg/dL   ESTIMATED GFR    Collection Time: 01/13/21  4:11 AM   Result Value Ref Range    GFR If  14 (A) >60 mL/min/1.73 m 2    GFR If Non African American 11 (A) >60 mL/min/1.73 m 2   ACCU-CHEK GLUCOSE    Collection Time: 01/13/21  8:25 AM   Result Value Ref Range    Glucose - Accu-Ck 122 (H) 65 - 99 mg/dL   ACCU-CHEK GLUCOSE    Collection Time: 01/13/21  5:28 PM   Result Value Ref Range    Glucose - Accu-Ck 146 (H) 65 - 99 mg/dL   CULTURE WOUND W/ GRAM STAIN    Collection Time: 01/13/21  6:15 PM    Specimen: Penis; Wound   Result Value Ref Range    Significant Indicator NEG     Source WND     Site PENIS     Culture Result Culture in progress.     Gram Stain Result No organisms seen.    GRAM STAIN    Collection Time: 01/13/21  6:15 PM    Specimen: Wound   Result Value Ref Range    Significant Indicator .     Source WND     Site PENIS      Gram Stain Result No organisms seen.    ACCU-CHEK GLUCOSE    Collection Time: 01/13/21  8:11 PM   Result Value Ref Range    Glucose - Accu-Ck 207 (H) 65 - 99 mg/dL   CBC with Differential    Collection Time: 01/14/21  5:17 AM   Result Value Ref Range    WBC 9.8 4.8 - 10.8 K/uL    RBC 2.80 (L) 4.70 - 6.10 M/uL    Hemoglobin 8.7 (L) 14.0 - 18.0 g/dL    Hematocrit 27.9 (L) 42.0 - 52.0 %    MCV 99.6 (H) 81.4 - 97.8 fL    MCH 31.1 27.0 - 33.0 pg    MCHC 31.2 (L) 33.7 - 35.3 g/dL    RDW 57.1 (H) 35.9 - 50.0 fL    Platelet Count 337 164 - 446 K/uL    MPV 8.9 (L) 9.0 - 12.9 fL    Neutrophils-Polys 71.60 44.00 - 72.00 %    Lymphocytes 14.70 (L) 22.00 - 41.00 %    Monocytes 8.70 0.00 - 13.40 %    Eosinophils 0.10 0.00 - 6.90 %    Basophils 0.70 0.00 - 1.80 %    Immature Granulocytes 4.20 (H) 0.00 - 0.90 %    Nucleated RBC 0.00 /100 WBC    Neutrophils (Absolute) 7.01 1.82 - 7.42 K/uL    Lymphs (Absolute) 1.44 1.00 - 4.80 K/uL    Monos (Absolute) 0.85 0.00 - 0.85 K/uL    Eos (Absolute) 0.01 0.00 - 0.51 K/uL    Baso (Absolute) 0.07 0.00 - 0.12 K/uL    Immature Granulocytes (abs) 0.41 (H) 0.00 - 0.11 K/uL    NRBC (Absolute) 0.00 K/uL   Comp Metabolic Panel (CMP)    Collection Time: 01/14/21  5:17 AM   Result Value Ref Range    Sodium 133 (L) 135 - 145 mmol/L    Potassium 3.1 (L) 3.6 - 5.5 mmol/L    Chloride 95 (L) 96 - 112 mmol/L    Co2 26 20 - 33 mmol/L    Anion Gap 12.0 7.0 - 16.0    Glucose 142 (H) 65 - 99 mg/dL    Bun 27 (H) 8 - 22 mg/dL    Creatinine 6.59 (HH) 0.50 - 1.40 mg/dL    Calcium 9.0 8.5 - 10.5 mg/dL    AST(SGOT) 12 12 - 45 U/L    ALT(SGPT) <5 2 - 50 U/L    Alkaline Phosphatase 85 30 - 99 U/L    Total Bilirubin 0.3 0.1 - 1.5 mg/dL    Albumin 3.2 3.2 - 4.9 g/dL    Total Protein 6.2 6.0 - 8.2 g/dL    Globulin 3.0 1.9 - 3.5 g/dL    A-G Ratio 1.1 g/dL   Magnesium    Collection Time: 01/14/21  5:17 AM   Result Value Ref Range    Magnesium 1.9 1.5 - 2.5 mg/dL   Vitamin D, 25-hydroxy (blood)    Collection Time: 01/14/21   5:17 AM   Result Value Ref Range    25-Hydroxy   Vitamin D 25 48 30 - 100 ng/mL   Phosphorus    Collection Time: 01/14/21  5:17 AM   Result Value Ref Range    Phosphorus 2.5 2.5 - 4.5 mg/dL   ESTIMATED GFR    Collection Time: 01/14/21  5:17 AM   Result Value Ref Range    GFR If African American 10 (A) >60 mL/min/1.73 m 2    GFR If Non  8 (A) >60 mL/min/1.73 m 2   ACCU-CHEK GLUCOSE    Collection Time: 01/14/21  8:09 AM   Result Value Ref Range    Glucose - Accu-Ck 153 (H) 65 - 99 mg/dL   ACCU-CHEK GLUCOSE    Collection Time: 01/14/21 11:13 AM   Result Value Ref Range    Glucose - Accu-Ck 133 (H) 65 - 99 mg/dL   URINALYSIS    Collection Time: 01/14/21  3:00 PM    Specimen: Urine, Cath   Result Value Ref Range    Color Yellow     Character Clear     Specific Gravity 1.009 <1.035    Ph 8.5 (A) 5.0 - 8.0    Glucose 250 (A) Negative mg/dL    Ketones Negative Negative mg/dL    Protein 100 (A) Negative mg/dL    Bilirubin Negative Negative    Urobilinogen, Urine 0.2 Negative    Nitrite Negative Negative    Leukocyte Esterase Negative Negative    Occult Blood Trace (A) Negative    Micro Urine Req Microscopic    URINE MICROSCOPIC (W/UA)    Collection Time: 01/14/21  3:00 PM   Result Value Ref Range    WBC 0-2 (A) /hpf    RBC 2-5 (A) /hpf    Bacteria Negative None /hpf    Epithelial Cells Negative /hpf    Hyaline Cast 0-2 /lpf   ACCU-CHEK GLUCOSE    Collection Time: 01/14/21  5:23 PM   Result Value Ref Range    Glucose - Accu-Ck 124 (H) 65 - 99 mg/dL   ACCU-CHEK GLUCOSE    Collection Time: 01/14/21  8:45 PM   Result Value Ref Range    Glucose - Accu-Ck 138 (H) 65 - 99 mg/dL   HEMOGLOBIN A1C    Collection Time: 01/15/21  6:37 AM   Result Value Ref Range    Glycohemoglobin 5.2 0.0 - 5.6 %    Est Avg Glucose 103 mg/dL   IRON/TOTAL IRON BIND    Collection Time: 01/15/21  6:37 AM   Result Value Ref Range    Iron 65 50 - 180 ug/dL    Total Iron Binding 187 (L) 250 - 450 ug/dL    Unsat Iron Binding 122 110 - 370 ug/dL     % Saturation 35 15 - 55 %   FERRITIN    Collection Time: 01/15/21  6:37 AM   Result Value Ref Range    Ferritin 1005.0 (H) 22.0 - 322.0 ng/mL   PTH INTACT (PTH ONLY)    Collection Time: 01/15/21  6:37 AM   Result Value Ref Range    Pth, Intact 109.0 (H) 14.0 - 72.0 pg/mL   Renal Function Panel    Collection Time: 01/15/21  6:37 AM   Result Value Ref Range    Sodium 134 (L) 135 - 145 mmol/L    Potassium 3.6 3.6 - 5.5 mmol/L    Chloride 95 (L) 96 - 112 mmol/L    Co2 26 20 - 33 mmol/L    Glucose 225 (H) 65 - 99 mg/dL    Creatinine 4.30 (H) 0.50 - 1.40 mg/dL    Bun 13 8 - 22 mg/dL    Calcium 9.2 8.5 - 10.5 mg/dL    Phosphorus 1.5 (L) 2.5 - 4.5 mg/dL    Albumin 3.5 3.2 - 4.9 g/dL   FOLATE    Collection Time: 01/15/21  6:37 AM   Result Value Ref Range    Folate -Folic Acid 8.5 >4.0 ng/mL   VITAMIN B12    Collection Time: 01/15/21  6:37 AM   Result Value Ref Range    Vitamin B12 -True Cobalamin 852 211 - 911 pg/mL   PROCALCITONIN    Collection Time: 01/15/21  6:37 AM   Result Value Ref Range    Procalcitonin 11.33 (H) <0.25 ng/mL   ESTIMATED GFR    Collection Time: 01/15/21  6:37 AM   Result Value Ref Range    GFR If  16 (A) >60 mL/min/1.73 m 2    GFR If Non  13 (A) >60 mL/min/1.73 m 2       Current Facility-Administered Medications   Medication Frequency   • insulin regular (HumuLIN R,NovoLIN R) injection 4X/DAY ACHS    And   • dextrose 50% (D50W) injection 50 mL Q15 MIN PRN   • gabapentin (NEURONTIN) capsule 300 mg DAILY   • phosphorus (K-Phos-Neutral) per tablet 250 mg BID   • tamsulosin (FLOMAX) capsule 0.4 mg QHS   • heparin injection 1,800 Units DIALYSIS PRN   • hydrOXYzine HCl (ATARAX) tablet 50 mg Q6HRS PRN   • melatonin tablet 3 mg HS PRN   • Respiratory Therapy Consult Continuous RT   • Pharmacy Consult Request ...Pain Management Review 1 Each PHARMACY TO DOSE   • acetaminophen (Tylenol) tablet 650 mg Q4HRS PRN   • lactulose 20 GM/30ML solution 30 mL QDAY PRN   • docusate sodium  (ENEMEEZ) enema 283 mg QDAY PRN   • fleet enema 133 mL QDAY PRN   • artificial tears ophthalmic solution 1 Drop PRN   • benzocaine-menthol (CEPACOL) lozenge 1 Lozenge Q2HRS PRN   • mag hydrox-al hydrox-simeth (MAALOX PLUS ES or MYLANTA DS) suspension 20 mL Q2HRS PRN   • ondansetron (ZOFRAN ODT) dispertab 4 mg 4X/DAY PRN    Or   • ondansetron (ZOFRAN) syringe/vial injection 4 mg 4X/DAY PRN   • traZODone (DESYREL) tablet 50 mg QHS PRN   • sodium chloride (OCEAN) 0.65 % nasal spray 2 Spray PRN   • traMADol (ULTRAM) 50 MG tablet 50 mg Q4HRS PRN   • atorvastatin (LIPITOR) tablet 40 mg Q EVENING   • calcitRIOL (ROCALTROL) capsule 0.25 mcg DAILY   • epoetin (Retacrit) injection (Dialysis use only) 10,000 Units TUE+THU+SAT   • methimazole (TAPAZOLE) tablet 5 mg Q EVENING   • methimazole (TAPAZOLE) tablet 7.5 mg QAM   • metoprolol (LOPRESSOR) tablet 37.5 mg TWICE DAILY   • omeprazole (PRILOSEC) capsule 20 mg DAILY   • oxyCODONE immediate-release (ROXICODONE) tablet 5 mg Q4HRS PRN   • senna-docusate (PERICOLACE or SENOKOT S) 8.6-50 MG per tablet 2 Tab BID    And   • magnesium hydroxide (MILK OF MAGNESIA) suspension 30 mL QDAY PRN    And   • bisacodyl (DULCOLAX) suppository 10 mg QDAY PRN   • apixaban (ELIQUIS) tablet 2.5 mg BID       Orders Placed This Encounter   Procedures   • Diet Order Diet: Renal; Second Modifier: (optional): Consistent CHO (Diabetic)     Standing Status:   Standing     Number of Occurrences:   1     Order Specific Question:   Diet:     Answer:   Renal [8]     Order Specific Question:   Second Modifier: (optional)     Answer:   Consistent CHO (Diabetic) [4]       Assessment:  Active Hospital Problems    Diagnosis   • *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)   • Leukocytosis   • Urinary retention   • Paroxysmal A-fib (HCC)   • End stage renal disease on dialysis (HCC)   • Type 2 diabetes mellitus with kidney complication, with long-term current use of insulin (HCC)   • Essential  hypertension   • Anemia   • Hyperthyroidism   • Dyslipidemia   • Hypokalemia   • Impaired mobility and ADLs     This patient is a 75 y.o. male admitted for acute inpatient rehabilitation with 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).    Therapy notes reviewed.    We will have his first weekly conference on Monday to discuss a discharge date.    Medical Decision Making and Plan:    Right BKA  12/31 Dr. Lynn  Continue full rehab program  PT/OT/SLP, 1 hr each discipline, 5 days per week  Outpatient follow-up for suture removal after discharge    Phantom pain  Start gabapentin 300 mg, maximum due to renal failure     Encephalopathy, resolved  Speech therapy for cognitive assessment  Currently only with mild memory deficits     Urinary retention, continues  Patient oliguric at baseline, but has only been on dialysis for one year  Has high residuals  Continue intermittent caths for over 400 cc  Started Flomax 0.4 mg 1/14   PRN lidocaine jelly for caths    Bowel program  History of diabetic gastroparesis  Continue bowel medications  Scheduled Sennakot  PRN Miralax, MOM, bisacodyl suppository  Last BM 1/15    DVT prophylaxis  Eliquis     Appreciate the assistance of the hospitalist with his medical comorbidities:     End-stage renal disease on hemodialysis  Consulted nephrology  Continue calcitriol 0.25 mcg daily  Continue Retacrit with dialysis  Started on K-Phos 250 mg 2 times daily     Diabetes with hyperglycemia  Sliding scale insulin     Peripheral artery disease  Restarted Eliquis, renal dosing  Continue statin     Coronary artery disease  With history of stents  Continue statin     Paroxysmal atrial fibrillation  Restarted Eliquis, renal dosing  Continue metoprolol 37.5 mg twice daily     Hypertension  Continue metoprolol 37.5 mg twice daily     Hyperlipidemia  Continue statin     Hyperthyroidism  Continue methimazole 7.5 mg in the morning, 5 mg in the evening     GERD  Continue omeprazole  20 mg     Anemia  Likely of chronic renal disease  Continue Retacrit     Leukocytosis, resolved  Recently treated with IV antibiotics for pneumonia    Elevated procalcitonin, improved  Recently treated with IV antibiotics for pneumonia    Penile discharge  Culture negative to growth thus far    COVID negative 1/11, re-screen 1/17    Total time:  26 minutes.  I spent greater than 50% of the time for patient care, counseling, and coordination on this date, including patient face-to face time, unit/floor time with review of records/pertinent lab data and studies, as well as discussing diagnostic evaluation/work up, planned therapeutic interventions, and future disposition of care, as per the interval events/subjective and the assessment and plan as noted above.    I have performed a physical exam, reviewed and updated ROS, as well as the assessment and plan today 1/15/2021. In review of note from 1/14/21 there are no new changes except as documented above.      Lata Goodman M.D.   Physical Medicine and Rehabilitation

## 2021-01-15 NOTE — CARE PLAN
Problem: Communication  Goal: The ability to communicate needs accurately and effectively will improve  Outcome: PROGRESSING AS EXPECTED  Note: Pt uses call light and speaks clearly.     Problem: Safety  Goal: Will remain free from injury  Outcome: PROGRESSING AS EXPECTED  Goal: Will remain free from falls  Outcome: PROGRESSING AS EXPECTED

## 2021-01-15 NOTE — PROGRESS NOTES
Nephrology Daily Progress Note    Date of Service  1/15/2021    Chief Complaint  A 75-year-old male with end-stage renal disease   on chronic hemodialysis Tuesdays, Thursdays and Saturdays.  The patient was   admitted on 12/31 for right below-the-knee amputation.  He had wound and   gangrene of the right foot.  He has had some peripheral vascular procedures   previously.  He did have a right ray amputation in 11/2020.  He has had a   nonhealing wound and he was admitted for right below-the-knee amputation that   was performed on 12/31.  We have been consulted to assist in his dialysis   needs.  He had dialysis on 12/31 prior to admission.  Today, he is feeling   dyspneic.  He has no cough or sputum production.    Interval Problem Update  1/13- Transferred to Rehab  1/14 - Pt sitting up in W/C in room, denies any complaints/concerns, due for HD today, BP labile, K+ 3.1, per chart review-penile discharge cx sent  1/15 - Pt getting up to restroom to shower with assistance, HD yesterday with 2.1L UF, K+ corrected to 3.6, iron stores replete, no growth on penile wound cx, VSS on RA     Review of Systems  ROS   10 point ROS was performed and is as per HPI or otherwise negative     Physical Exam  Temp:  [36.5 °C (97.7 °F)-36.7 °C (98.1 °F)] 36.6 °C (97.9 °F)  Pulse:  [70-88] 82  Resp:  [17-18] 17  BP: (122-140)/(52-70) 137/70  SpO2:  [95 %-100 %] 95 %    Physical Exam  Constitutional:       Appearance: Normal appearance.   HENT:      Head: Normocephalic and atraumatic.      Nose: Nose normal.      Mouth/Throat:      Mouth: Mucous membranes are moist.      Pharynx: Oropharynx is clear.   Eyes:      Extraocular Movements: Extraocular movements intact.      Pupils: Pupils are equal, round, and reactive to light.   Neck:      Musculoskeletal: Normal range of motion and neck supple.   Cardiovascular:      Rate and Rhythm: Normal rate and regular rhythm.      Pulses: Normal pulses.      Comments: L AVF +T/B  Pulmonary:       Effort: Pulmonary effort is normal.      Breath sounds: Normal breath sounds.   Abdominal:      General: Bowel sounds are normal.      Palpations: Abdomen is soft.   Musculoskeletal:         General: No deformity.      Comments: R BKA    Skin:     General: Skin is warm and dry.   Neurological:      General: No focal deficit present.      Mental Status: He is alert and oriented to person, place, and time.   Psychiatric:         Mood and Affect: Mood normal.         Behavior: Behavior normal.         Fluids    Intake/Output Summary (Last 24 hours) at 1/15/2021 1021  Last data filed at 1/15/2021 0741  Gross per 24 hour   Intake 640 ml   Output 3450 ml   Net -2810 ml       Laboratory  Recent Labs     01/13/21  0411 01/14/21  0517   WBC 11.8* 9.8   RBC 2.90* 2.80*   HEMOGLOBIN 9.2* 8.7*   HEMATOCRIT 28.7* 27.9*   MCV 99.0* 99.6*   MCH 31.7 31.1   MCHC 32.1* 31.2*   RDW 55.6* 57.1*   PLATELETCT 324 337   MPV 8.8* 8.9*     Recent Labs     01/13/21  0411 01/14/21  0517 01/15/21  0637   SODIUM 135 133* 134*   POTASSIUM 3.3* 3.1* 3.6   CHLORIDE 98 95* 95*   CO2 26 26 26   GLUCOSE 126* 142* 225*   BUN 21 27* 13   CREATININE 4.96* 6.59* 4.30*   CALCIUM 8.9 9.0 9.2         No results for input(s): NTPROBNP in the last 72 hours.        Imaging  Available labs, imaging and clinical documentation reviewed.     Assessment/Plan  #  End-stage renal disease, on chronic hemodialysis qTTS  - Normally dialyzes at [facility] via L AVF   #  Status post right BKA on 12/31/2020 for diabetic ulcer.  #  Hypertension, fair control.  - On metoprolol   #  Anemia: superimposed blood loss in addition to chronic kidney disease.  - Hgb below target   - On ARMANDO   - Iron replete, ferritin elevated   #  Diabetes mellitus.  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  - On calcitriol and calcium acetate   - Vit D 48   - Phos 1.5  - iPTH 109   #  Peripheral vascular disease.   #  Paroxysmal atrial fibrillation, on Eliquis and metoprolol.   #  Coronary artery  disease, status post stents.  Asymptomatic.  #  History of hyperthyroidism. On methimazole.   #  Dyslipidemia, on statin therapy.   #  Past history of heavy tobacco use.  #  Pneumonia: s/p IV Zosyn   #  Encephalopathy: resolving  #  Weakness/debility  #  Urinary retention requiring catheter placement, since removed  - Chronic oliguria  #  Hypokalemia, corrected  #  Hyponatremia in ESRD, mild, improved  #  Penile meatus wound  - Previously had Tomas  - NGTD on cx      PLAN:  -Continue hemodialysis qTTS/PRN, treatment due tomorrow (SAT)  -UF as tolerated  -Holding calcium acetate for now given hypophos; trend level   -ARMANDO TIW with HD, goal Hgb 10-11   -Transfuse PRN Hgb <7  -Continue home medications   -Goal BP <140/90  -No dietary protein restrictions  -Dose meds for ESRD  -Limit narcotics/sedating meds  -PT/OT/Speech  -Follow labs    Thank you,

## 2021-01-15 NOTE — PROGRESS NOTES
[facility] Medicine Daily Progress Note    Date of Service  1/15/2021    Chief Complaint:  Hypertension  Afib  Diabetes  Leukocytosis  Penile wound    Interval History:  No significant events or changes since last visit    Review of Systems  Review of Systems   Constitutional: Negative for chills and fever.   Respiratory: Negative for shortness of breath.    Cardiovascular: Negative for chest pain.   Gastrointestinal: Negative for abdominal pain, diarrhea, nausea and vomiting.   Psychiatric/Behavioral: The patient is not nervous/anxious.         Physical Exam  Temp:  [36.5 °C (97.7 °F)-36.7 °C (98.1 °F)] 36.6 °C (97.9 °F)  Pulse:  [70-88] 82  Resp:  [17-18] 17  BP: (122-140)/(52-70) 137/70  SpO2:  [95 %-100 %] 95 %    Physical Exam  Vitals signs and nursing note reviewed.   Constitutional:       Appearance: Normal appearance.   HENT:      Head: Atraumatic.   Eyes:      Conjunctiva/sclera: Conjunctivae normal.      Pupils: Pupils are equal, round, and reactive to light.   Neck:      Musculoskeletal: Normal range of motion and neck supple.   Cardiovascular:      Rate and Rhythm: Normal rate and regular rhythm.   Pulmonary:      Effort: Pulmonary effort is normal.      Breath sounds: Normal breath sounds.   Abdominal:      General: Bowel sounds are normal.      Palpations: Abdomen is soft.   Genitourinary:     Comments: There is a small wound at the meatus, no discharge noted.  Musculoskeletal:      Right lower leg: No edema.      Left lower leg: No edema.   Skin:     General: Skin is warm and dry.   Neurological:      Mental Status: He is alert and oriented to person, place, and time.   Psychiatric:         Mood and Affect: Mood normal.         Behavior: Behavior normal.         Fluids    Intake/Output Summary (Last 24 hours) at 1/15/2021 0857  Last data filed at 1/15/2021 0741  Gross per 24 hour   Intake 640 ml   Output 3450 ml   Net -2810 ml       Laboratory  Recent Labs     01/12/21  0930 01/13/21  4383  01/14/21  0517   WBC 11.1* 11.8* 9.8   RBC 2.77* 2.90* 2.80*   HEMOGLOBIN 8.8* 9.2* 8.7*   HEMATOCRIT 27.1* 28.7* 27.9*   MCV 97.8 99.0* 99.6*   MCH 31.8 31.7 31.1   MCHC 32.5* 32.1* 31.2*   RDW 54.5* 55.6* 57.1*   PLATELETCT 356 324 337   MPV 8.9* 8.8* 8.9*     Recent Labs     01/13/21  0411 01/14/21  0517 01/15/21  0637   SODIUM 135 133* 134*   POTASSIUM 3.3* 3.1* 3.6   CHLORIDE 98 95* 95*   CO2 26 26 26   GLUCOSE 126* 142* 225*   BUN 21 27* 13   CREATININE 4.96* 6.59* 4.30*   CALCIUM 8.9 9.0 9.2                   Imaging    Assessment/Plan  * 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)- (present on admission)  Assessment & Plan  2nd to PVD & diabetes with non-healing wound and reported gangrene  S/P right BKA (12/31)    Leukocytosis- (present on admission)  Assessment & Plan  S/P leukocytosis: WBC's: 11.8 --> 9.8 (1/14)  Has been afebrile  Denies cough  Has a small, nonpainful penile wound infection near the opening of the meatus         -- reportedly had a little trauma at [facility] when placing palacios         -- appears to be healing (compared to recent photo)  CXR (1/11): stable interstitial opacity or fibrotic change in the left lower lobe  Procalcitonin: 1.65 (1/8) --> 30.5 (1/11) --> 11.33 (1/15) -- ? etiology -- ? residual from recent PNA  Was recently treated at [facility] for PNA with Unasyn and then Zosyn (? duration)  Syphilis neg (11/16/20 ; not sure why this was done)  HIV neg (11/16/20 ; not sure why this was done)  U/A (-)  Penile wound cultures: NTD, cont to follow  Monitor    Paroxysmal A-fib (HCC)- (present on admission)  Assessment & Plan  HR ok  On Lopressor: 37.5 mg bid  Monitor    End stage renal disease on dialysis (HCC)- (present on admission)  Assessment & Plan  On HD (T, Th, Sat)  PO4: 1.8 --> 2.5 --> 1.5 (1/15)  On PhosLo (1/15)  Will give PO4 supplements x 4 doses (1/15)    Type 2 diabetes mellitus with kidney complication, with long-term current use of insulin (HCC)- (present  on admission)  Assessment & Plan  Hba1c: 7.8 (11/14/20) --> 5.2 (1/15)  BS: 124-199  Currently not on any diabetic meds  Will decrease SS coverage for better BS eval (1/15)  Note: home meds were Lantus 5 units qhs  Will monitor another day and consider starting low dose Lantus (1/15)    Anemia- (present on admission)  Assessment & Plan  Has a recent hx -- likely 2nd to kidney disease  Recent surgery likely a component  Hb stable: 8.7 (1/14)  Fe: 65, sats 35%  B12: 852  Folate: 8.5  On Epogen    Essential hypertension- (present on admission)  Assessment & Plan  BP ok recently  On Lopressor: 37.5 mg bid  Cont to monitor    Hyperthyroidism- (present on admission)  Assessment & Plan  TSH: wnl (1/6)  On Tapazole    Hypokalemia- (present on admission)  Assessment & Plan  K+: 3.1 (1/14) --> 3.6 (1/15)  S/P KCL 40 meq x 1 dose (1/14)  Monitor    Dyslipidemia- (present on admission)  Assessment & Plan  On Lipitor

## 2021-01-15 NOTE — THERAPY
Speech Language Pathology  Daily Treatment     Patient Name: Luis Sepulveda  Age:  75 y.o., Sex:  male  Medical Record #: [id number]  Today's Date: 1/15/2021     Precautions  Precautions: Fall Risk, Non Weight Bearing Right Lower Extremity  Comments: Dialysis Tues, Thurs, Sat; L arm fistula; previously used port    Subjective    Pt pleasant and cooperative.      Objective       01/15/21 1333   SLP Total Time Spent   SLP Individual Total Time Spent (Mins) 60   Treatment Charges   SLP Cognitive Skill Development First 15 Minutes 1   SLP Cognitive Skill Development Additional 15 Minutes 3       Assessment    Functional medication list formulated with medications reviewed at this time. Pt indep aware of 11/14 current medications. Otherwise assistance required for NEW medications such as gabapentin, Flomax and senokot. Introduced pt to use of functional memory strategies. Pt indep able to generate making connections/associations, visualizing and repeating to instill information into head. SLP rec use of witting information down whether this be with paper and pencil or use of phone/laptop.      Plan    Initiate use of memory log, complete functional medication sort with current meds.     Speech Therapy Problems     Problem: Memory STGs     Dates: Start: 01/14/21       Goal: STG-Within one week, patient will     Dates: Start: 01/14/21       Description: 1) Individualized goal:  complete verbal recall tasks with brief (5-10 minute) delay with 70% accuracy provided MIN cues.   2) Interventions:  SLP Self Care / ADL Training , SLP Cognitive Skill Development, and SLP Group Treatment                Problem: Problem Solving STGs     Dates: Start: 01/14/21       Goal: STG-Within one week, patient will     Dates: Start: 01/14/21       Description: 1) Individualized goal:  complete complex attention tasks involving medication management with 80% accuracy provided SPV.   2) Interventions:  SLP Self Care / ADL Training , SLP  Cognitive Skill Development, and SLP Group Treatment                Problem: Speech/Swallowing LTGs     Dates: Start: 01/14/21       Goal: LTG-By discharge, patient will     Dates: Start: 01/14/21       Description: 1) Individualized goal:  complete functional problem solving and recall information with 80% accuracy and MOD I.   2) Interventions:  SLP Self Care / ADL Training , SLP Cognitive Skill Development, and SLP Group Treatment

## 2021-01-15 NOTE — CARE PLAN
Problem: Safety  Goal: Will remain free from injury  Outcome: PROGRESSING AS EXPECTED  Note: Pt uses call light  appropriately. Waits for assistance and does not attempt self transfer this shift. Able to verbalize needs.      Problem: Bowel/Gastric:  Goal: Normal bowel function is maintained or improved  Outcome: PROGRESSING AS EXPECTED  Note: Pt continent of bowel. On bowel meds. LBM 1/14/21.

## 2021-01-15 NOTE — THERAPY
Physical Therapy   Daily Treatment     Patient Name: Luis Sepulveda  Age:  75 y.o., Sex:  male  Medical Record #: [id number]  Today's Date: 1/15/2021     Precautions  Precautions: Fall Risk, Non Weight Bearing Right Lower Extremity  Comments: Dialysis Tues, Thurs, Sat; L arm fistula; previously used port    Subjective    Pt reports he has been having phantom limb pains      Objective       01/15/21 0831   Sitting Lower Body Exercises   Long Arc Quad 2 sets of 15;Bilateral   Marching 2 sets of 15;Reciprocal   Hamstring Curl 2 sets of 15;Bilateral   Comments VCs adn demo for form adn tehcnqiue   Interdisciplinary Plan of Care Collaboration   Patient Position at End of Therapy Seated;Call Light within Reach;Tray Table within Reach;Self Releasing Lap Belt Applied;Chair Alarm On   PT Total Time Spent   PT Individual Total Time Spent (Mins) 60   PT Charge Group   PT Therapeutic Exercise 1   PT Neuromuscular Re-Education / Balance 1   PT Therapeutic Activities 2     Pt edu on desensitization - rubbing, tapping, visualization, gentle pressure for pain relief. Pt edu on skin care and safety with shinkers as well.     Discussed IPOP ordering for protection and maintaining extension with prosthetist and pt. Pt to be fit for one this afternoon.     Assessment    Pt responds well to edu this session and performs exercises well as well. Pt motivated for exercise and continuing towards increased mobility.     Strengths: Pleasant and cooperative, Able to follow instructions, Independent prior level of function, Motivated for self care and independence, Willingly participates in therapeutic activities  Barriers: Decreased endurance, Generalized weakness, Impaired balance, Pain    Plan    Go over mat/bed program for exercise and stretching, Progress with BKA edu, STS FWW, progress towards ambulation trial,     Physical Therapy Problems     Problem: Mobility     Dates: Start: 01/14/21       Goal: STG-Within one week, patient will  propel wheelchair [facility]     Dates: Start: 01/14/21       Description: 1) Individualized goal:  150 ft At mod I in order to improve activity tolerance propelling himself around facility  2) Interventions:  PT Gait Training, PT Therapeutic Exercises, PT Neuro Re-Ed/Balance, PT Therapeutic Activity, PT Manual Therapy, and PT Evaluation                  Problem: Mobility Transfers     Dates: Start: 01/14/21       Goal: STG-Within one week, patient will transfer bed to chair     Dates: Start: 01/14/21       Description: 1) Individualized goal:  At min A with LRD In order to maximize self mobility  2) Interventions:  PT Gait Training, PT Therapeutic Exercises, PT Neuro Re-Ed/Balance, PT Therapeutic Activity, PT Manual Therapy, and PT Evaluation                Problem: PT-Long Term Goals     Dates: Start: 01/14/21       Goal: LTG-By discharge, patient will propel wheelchair     Dates: Start: 01/14/21       Description: 1) Individualized goal:  150 ft At mod I indoor and outdoor in order to improve activity tolerance propelling himself around facility  2) Interventions:  PT Gait Training, PT Therapeutic Exercises, PT Neuro Re-Ed/Balance, PT Therapeutic Activity, PT Manual Therapy, and PT Evaluation          Goal: LTG-By discharge, patient will transfer one surface to another     Dates: Start: 01/14/21       Description: 1) Individualized goal:  At mod I with LRD In order to maximize self mobility  2) Interventions:  PT Gait Training, PT Therapeutic Exercises, PT Neuro Re-Ed/Balance, PT Therapeutic Activity, PT Manual Therapy, and PT Evaluation          Goal: LTG-By discharge, patient will transfer in/out of a car     Dates: Start: 01/14/21       Description: 1) Individualized goal:  At SPV with LRD In order to maximize self mobility  2) Interventions:  PT Gait Training, PT Therapeutic Exercises, PT Neuro Re-Ed/Balance, PT Therapeutic Activity, PT Manual Therapy, and PT Evaluation

## 2021-01-15 NOTE — PROGRESS NOTES
[facility] Services Progress Note     Hemodialysis treatment ordered today per Dr. EDGARDO Tristan x 3.5 hours.   Treatment initiated at 1527 ended at 1858.      Patient tolerated tx; see paper flow sheet for details.      Net UF 2100 mL.      Needles removed from access site. Dressings applied and sites held x 10 minutes; verified no bleeding. Positive bruit/thrill post tx. Staff RN to monitor AVF for breakthrough bleeding. Should breakthrough bleeding occur, staff RN to apply pressure to access sites until bleeding resolved. Notify Dialysis and Nephrologist for follow-up.     Report given to Primary RN.

## 2021-01-16 PROBLEM — S30.812A ABRASION OF PENIS WITH INFECTION: Status: ACTIVE | Noted: 2021-01-01

## 2021-01-16 PROBLEM — N48.29 ABRASION OF PENIS WITH INFECTION: Status: ACTIVE | Noted: 2021-01-01

## 2021-01-16 NOTE — PROGRESS NOTES
Nephrology Daily Progress Note    Date of Service  1/16/2021    Chief Complaint  A 75-year-old male with end-stage renal disease   on chronic hemodialysis Tuesdays, Thursdays and Saturdays.  The patient was   admitted on 12/31 for right below-the-knee amputation.  He had wound and   gangrene of the right foot.  He has had some peripheral vascular procedures   previously.  He did have a right ray amputation in 11/2020.  He has had a   nonhealing wound and he was admitted for right below-the-knee amputation that   was performed on 12/31.  We have been consulted to assist in his dialysis   needs.  He had dialysis on 12/31 prior to admission.  Today, he is feeling   dyspneic.  He has no cough or sputum production.    Interval Problem Update  1/13- Transferred to Rehab  1/14 - Pt sitting up in W/C in room, denies any complaints/concerns, due for HD today, BP labile, K+ 3.1, per chart review-penile discharge cx sent  1/15 - Pt getting up to restroom to shower with assistance, HD yesterday with 2.1L UF, K+ corrected to 3.6, iron stores replete, no growth on penile wound cx, VSS on RA   1/16 - No overnight events, HD later today.  No labs for review.  Sitting up at bedside eating lunch.  No complaints.     Review of Systems  ROS   10 point ROS was performed and is as per HPI or otherwise negative     Physical Exam  Temp:  [36.4 °C (97.6 °F)-36.7 °C (98.1 °F)] 36.4 °C (97.6 °F)  Pulse:  [61-79] 74  Resp:  [16-18] 16  BP: (104-131)/(40-64) 104/64  SpO2:  [95 %-100 %] 95 %    Physical Exam  Constitutional:       Appearance: Normal appearance.   HENT:      Head: Normocephalic and atraumatic.      Nose: Nose normal.      Mouth/Throat:      Mouth: Mucous membranes are moist.      Pharynx: Oropharynx is clear.   Eyes:      Extraocular Movements: Extraocular movements intact.      Pupils: Pupils are equal, round, and reactive to light.   Neck:      Musculoskeletal: Normal range of motion and neck supple.   Cardiovascular:      Rate  and Rhythm: Normal rate and regular rhythm.      Pulses: Normal pulses.      Comments: L AVF +T/B  Pulmonary:      Effort: Pulmonary effort is normal.      Breath sounds: Normal breath sounds.   Abdominal:      General: Bowel sounds are normal.      Palpations: Abdomen is soft.   Musculoskeletal:         General: No deformity.      Comments: R BKA    Skin:     General: Skin is warm and dry.   Neurological:      General: No focal deficit present.      Mental Status: He is alert and oriented to person, place, and time.   Psychiatric:         Mood and Affect: Mood normal.         Behavior: Behavior normal.         Fluids    Intake/Output Summary (Last 24 hours) at 1/16/2021 1139  Last data filed at 1/16/2021 0546  Gross per 24 hour   Intake 240 ml   Output --   Net 240 ml       Laboratory  Recent Labs     01/14/21  0517   WBC 9.8   RBC 2.80*   HEMOGLOBIN 8.7*   HEMATOCRIT 27.9*   MCV 99.6*   MCH 31.1   MCHC 31.2*   RDW 57.1*   PLATELETCT 337   MPV 8.9*     Recent Labs     01/14/21  0517 01/15/21  0637   SODIUM 133* 134*   POTASSIUM 3.1* 3.6   CHLORIDE 95* 95*   CO2 26 26   GLUCOSE 142* 225*   BUN 27* 13   CREATININE 6.59* 4.30*   CALCIUM 9.0 9.2         No results for input(s): NTPROBNP in the last 72 hours.        Imaging  Available labs, imaging and clinical documentation reviewed.     Assessment/Plan  #  End-stage renal disease, on chronic hemodialysis qTTS  - Normally dialyzes at [facility] via L AVF   #  Status post right BKA on 12/31/2020 for diabetic ulcer.  #  Hypertension, fair control.  - On metoprolol   #  Anemia: superimposed blood loss in addition to chronic kidney disease.  - Hgb below target   - On ARMANDO   - Iron replete, ferritin elevated   #  Diabetes mellitus.  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  - On calcitriol and calcium acetate   - Calcium acetate currently being held   - Vit D 48   - Phos 1.5  - iPTH 109   #  Peripheral vascular disease.   #  Paroxysmal atrial fibrillation, on Eliquis and  metoprolol.   #  Coronary artery disease, status post stents.  Asymptomatic.  #  History of hyperthyroidism. On methimazole.   #  Dyslipidemia, on statin therapy.   #  Past history of heavy tobacco use.  #  Pneumonia: s/p IV Zosyn   #  Encephalopathy: resolving  #  Weakness/debility  #  Urinary retention requiring catheter placement, since removed  - Chronic oliguria  #  Hypokalemia, corrected  #  Hyponatremia in ESRD, mild, improved  #  Penile meatus wound  - Previously had Tomas  - NGTD on cx      PLAN:  -HD today (Sat) and Continue hemodialysis qTTS/PRN  -UF as tolerated  -Holding calcium acetate for now given hypophos; trend level check level in AM    -ARMANDO TIW with HD, goal Hgb 10-11   -Transfuse PRN Hgb <7  -Continue home medications   -Goal BP <140/90  -No dietary protein restrictions  -Dose meds for ESRD  -Limit narcotics/sedating meds  -PT/OT/Speech  -Follow labs    Thank you,

## 2021-01-16 NOTE — PROGRESS NOTES
[facility] Medicine Daily Progress Note    Date of Service  1/16/2021    Chief Complaint:  Hypertension  Afib  Diabetes  Leukocytosis  Penile wound    Interval History:  No significant events or changes since last visit    Review of Systems  Review of Systems   Constitutional: Negative for fever.   Eyes: Negative for blurred vision.   Respiratory: Negative for shortness of breath.    Cardiovascular: Negative for palpitations.   Gastrointestinal: Negative for nausea and vomiting.   Neurological: Negative for dizziness and headaches.   Psychiatric/Behavioral: Negative for hallucinations.        Physical Exam  Temp:  [36.4 °C (97.6 °F)-36.7 °C (98.1 °F)] 36.4 °C (97.6 °F)  Pulse:  [61-79] 74  Resp:  [16-18] 16  BP: (104-131)/(40-64) 104/64  SpO2:  [95 %-100 %] 95 %    Physical Exam  Vitals signs and nursing note reviewed.   Constitutional:       General: He is not in acute distress.  HENT:      Mouth/Throat:      Mouth: Mucous membranes are moist.      Pharynx: Oropharynx is clear.   Eyes:      General: No scleral icterus.  Cardiovascular:      Rate and Rhythm: Normal rate and regular rhythm.   Pulmonary:      Effort: Pulmonary effort is normal.      Breath sounds: No wheezing or rales.   Abdominal:      General: Bowel sounds are normal.      Palpations: Abdomen is soft.   Genitourinary:     Comments: There is a small wound at the meatus, no discharge noted.  Musculoskeletal:      Right lower leg: No edema.      Left lower leg: No edema.   Skin:     General: Skin is warm and dry.   Neurological:      Mental Status: He is alert and oriented to person, place, and time.   Psychiatric:         Mood and Affect: Mood normal.         Behavior: Behavior normal.         Fluids    Intake/Output Summary (Last 24 hours) at 1/16/2021 0915  Last data filed at 1/16/2021 0546  Gross per 24 hour   Intake 240 ml   Output --   Net 240 ml       Laboratory  Recent Labs     01/14/21  0517   WBC 9.8   RBC 2.80*   HEMOGLOBIN 8.7*   HEMATOCRIT  27.9*   MCV 99.6*   MCH 31.1   MCHC 31.2*   RDW 57.1*   PLATELETCT 337   MPV 8.9*     Recent Labs     01/14/21  0517 01/15/21  0637   SODIUM 133* 134*   POTASSIUM 3.1* 3.6   CHLORIDE 95* 95*   CO2 26 26   GLUCOSE 142* 225*   BUN 27* 13   CREATININE 6.59* 4.30*   CALCIUM 9.0 9.2                   Imaging    Assessment/Plan  * 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)- (present on admission)  Assessment & Plan  2nd to PVD & diabetes with non-healing wound and reported gangrene  S/P right BKA (12/31)    Leukocytosis- (present on admission)  Assessment & Plan  S/P leukocytosis: WBC's: 11.8 --> 9.8 (1/14)  Has been afebrile  Denies cough  Has a small, nonpainful penile wound infection near the opening of the meatus         -- reportedly [place] [facility] [place]         -- appears to be healing (compared to recent photo)  CXR (1/11): stable interstitial opacity or fibrotic change in the left lower lobe  Procalcitonin: 1.65 (1/8) --> 30.5 (1/11) --> 11.33 (1/15) -- ? etiology -- ? residual from recent PNA  Was [place] [facility] [place] with Unasyn and then Zosyn (? duration)  Syphilis neg (11/16/20 ; not sure why this was done  HIV neg (11/16/20 ; not sure why this was done  U/A (-)  Penile wound cultures: shows yeast  Will start Diflucan (1/16)  Monitor    Paroxysmal A-fib (HCC)- (present on admission)  Assessment & Plan  HR ok  On Lopressor: 37.5 mg bid  Monitor    End stage renal disease on dialysis (HCC)- (present on admission)  Assessment & Plan  On HD (T, Th, Sat)  PO4: 1.8 --> 2.5 --> 1.5 (1/15)  Off PhosLo (1/15)  On PO4 supplements bid x 4 doses (1/15)    Type 2 diabetes mellitus with kidney complication, with long-term current use of insulin (HCC)- (present on admission)  Assessment & Plan  Hba1c: 7.8 (11/14/20) --> 5.2 (1/15)  BS: 167-208  Currently not on any diabetic meds  Will start Lantus: 5 units qam (1/16)  On SS coverage for better BS eval (1/15)  Note:  home meds were Lantus 5 units qhs  Cont to monitor    Anemia- (present on admission)  Assessment & Plan  Has a recent hx -- likely 2nd to kidney disease  Recent surgery likely a component  Hb stable: 8.7 (1/14)  Fe: 65, sats 35%  B12: 852  Folate: 8.5  On Epogen    Essential hypertension- (present on admission)  Assessment & Plan  BP ok recently  On Lopressor: 37.5 mg bid  Cont to monitor    Hyperthyroidism- (present on admission)  Assessment & Plan  TSH: wnl (1/6)  On Tapazole    Hypokalemia- (present on admission)  Assessment & Plan  K+: 3.1 (1/14) --> 3.6 (1/15)  S/P KCL 40 meq x 1 dose (1/14)  Monitor    Dyslipidemia- (present on admission)  Assessment & Plan  On Lipitor

## 2021-01-16 NOTE — THERAPY
Occupational Therapy  Daily Treatment     Patient Name: Luis Sepulveda  Age:  75 y.o., Sex:  male  Medical Record #: [id number]  Today's Date: 1/16/2021     Precautions  Precautions: Fall Risk, Non Weight Bearing Right Lower Extremity  Comments: Dialysis Tues, Thurs, Sat; L arm fistula; previously used port. R BKA         Subjective    Expresses need to work on BUE and  strength. Reports that prior to BKA RLE was sig stronger than LLE.      Objective       01/16/21 1001   Precautions   Precautions Fall Risk;Non Weight Bearing Right Lower Extremity   Comments Dialysis Tues, Thurs, Sat; L arm fistula; previously used port. R BKA   Pain 0 - 10 Group   Location Leg   Location Orientation Right   Pain Rating Scale (NPRS) 2   Description Aching   Sitting Upper Body Exercises   Side Arm Raise 1 set of 10;Right ;Left;Weight (See Comments for lbs)  (6lb wt)   Bicep Curls 2 sets of 15;Left;Right ;Weight (See Comments for lbs)  (6lb)   Tricep Press Right ;Left;Weight (See Comments for lbs)  (6lb wt overhead tricep press)   Other Exercise chest press B, 6lb wt 2 sets of 15   Hand Strengthening   Comment Provision of heavy resistance foam block for pt to work on B  strength.    Standing Lower Body Exercises   Other Exercises sit to stand x 6 with support of FWW. Range in stand time from 1-1:45 min. Initially requires mod A but give prior reps able to perform with min A. Cues for strategies to sequencing transfer and sustaining stand balance.   Interdisciplinary Plan of Care Collaboration   IDT Collaboration with  Nursing   Patient Position at End of Therapy Seated;Chair Alarm On;Tray Table within Reach;Call Light within Reach   Collaboration Comments checked pt's B sugar   OT Total Time Spent   OT Individual Total Time Spent (Mins) 60   OT Charge Group   OT Therapy Activity 2   OT Therapeutic Exercise  2        01/16/21 1001   Precautions   Precautions Fall Risk;Non Weight Bearing Right Lower Extremity   Comments  Dialysis Tues, Thurs, Sat; L arm fistula; previously used port. R BKA   Pain 0 - 10 Group   Location Leg   Location Orientation Right   Pain Rating Scale (NPRS) 2   Description Aching   Sitting Upper Body Exercises   Side Arm Raise 1 set of 10;Right ;Left;Weight (See Comments for lbs)  (6lb wt)   Bicep Curls 2 sets of 15;Left;Right ;Weight (See Comments for lbs)  (6lb)   Tricep Press Right ;Left;Weight (See Comments for lbs)  (6lb wt overhead tricep press)   Other Exercise chest press B, 6lb wt 2 sets of 15   Hand Strengthening   Comment Provision of heavy resistance foam block for pt to work on B  strength.    Standing Lower Body Exercises   Other Exercises sit to stand x 6 with support of FWW. Range in stand time from 1-1:45 min. Initially requires mod A but give prior reps able to perform with min A. Cues for strategies to sequencing transfer and sustaining stand balance.   Interdisciplinary Plan of Care Collaboration   IDT Collaboration with  Nursing   Patient Position at End of Therapy Seated;Chair Alarm On;Tray Table within Reach;Call Light within Reach   Collaboration Comments checked pt's B sugar   OT Total Time Spent   OT Individual Total Time Spent (Mins) 60   OT Charge Group   OT Therapy Activity 2   OT Therapeutic Exercise  2     Therapeutic act:  -education on and demonstration of mirror therapy to trial when experiencing phantom limb pain or irritation (I.e. itch).     Assessment  Pt motivated and able to complete BUE strengthening with 6lb wt and sit to stand/stand tolerance. Requires cues for optimal sequence of transfer and strategies to improve standing balance (I.e. engaging gluteal mm). Max stand time of 1:45 min. Given prior reps, pt able to perform sit to stand with CGA. Verbalizes an understanding of mirror therapy edu for phantom limb pain/sensations.     Strengths: Alert and oriented, Effective communication skills, Independent prior level of function, Making steady progress towards  goals, Pleasant and cooperative, Supportive family, Willingly participates in therapeutic activities  Barriers: Decreased endurance, Fatigue, Impaired balance(safety awareness)    Plan    ADL/iADL. BUE strengthening. Stand tolerance.     Occupational Therapy Goals     Problem: Dressing     Dates: Start: 01/14/21       Goal: STG-Within one week, patient will dress LB     Dates: Start: 01/14/21       Note:     Goal Note filed on 01/14/21 1519 by Zeina Taylor MS,OTR/L    1) Individualized Goal:  with Min A and AE as needed  2) Interventions:  OT Group Therapy, OT Self Care/ADL, OT Cognitive Skill Dev, OT Community Reintegration, OT Manual Ther Technique, OT Neuro Re-Ed/Balance, OT Therapeutic Activity, OT Evaluation, and OT Therapeutic Exercise                         Problem: Functional Transfers     Dates: Start: 01/14/21       Goal: STG-Within one week, patient will transfer to toilet     Dates: Start: 01/14/21       Note:     Goal Note filed on 01/14/21 1519 by Zeina Taylor MS,OTR/L    1) Individualized Goal:  with CGA and AE as needed  2) Interventions:  OT Group Therapy, OT Self Care/ADL, OT Cognitive Skill Dev, OT Community Reintegration, OT Manual Ther Technique, OT Neuro Re-Ed/Balance, OT Therapeutic Activity, OT Evaluation, and OT Therapeutic Exercise                   Goal: STG-Within one week, patient will transfer to tub/shower     Dates: Start: 01/14/21       Note:     Goal Note filed on 01/14/21 1519 by Zeina Taylor MS,OTR/L    1) Individualized Goal:  with CGA and AE as needed  2) Interventions:  OT Group Therapy, OT Self Care/ADL, OT Cognitive Skill Dev, OT Community Reintegration, OT Manual Ther Technique, OT Neuro Re-Ed/Balance, OT Therapeutic Activity, OT Evaluation, and OT Therapeutic Exercise                         Problem: OT Long Term Goals     Dates: Start: 01/14/21       Goal: LTG-By discharge, patient will complete basic self care tasks     Dates: Start: 01/14/21        Note:     Goal Note filed on 01/14/21 1519 by Zeina Taylor MS,OTR/L    1) Individualized Goal:  with mod I and AE as needed  2) Interventions:  OT Group Therapy, OT Self Care/ADL, OT Cognitive Skill Dev, OT Community Reintegration, OT Manual Ther Technique, OT Neuro Re-Ed/Balance, OT Therapeutic Activity, OT Evaluation, and OT Therapeutic Exercise                   Goal: LTG-By discharge, patient will perform bathroom transfers     Dates: Start: 01/14/21       Note:     Goal Note filed on 01/14/21 1519 by Zeina Taylor MS,OTR/L    1) Individualized Goal:  with mod I and AE as needed  2) Interventions:  OT Group Therapy, OT Self Care/ADL, OT Cognitive Skill Dev, OT Community Reintegration, OT Manual Ther Technique, OT Neuro Re-Ed/Balance, OT Therapeutic Activity, OT Evaluation, and OT Therapeutic Exercise                         Problem: Toileting     Dates: Start: 01/14/21       Goal: STG-Within one week, patient will complete toileting tasks     Dates: Start: 01/14/21       Note:     Goal Note filed on 01/14/21 1519 by Zeina Taylor MS,OTR/L    1) Individualized Goal:  with Min A and AE as needed  2) Interventions:  OT Group Therapy, OT Self Care/ADL, OT Cognitive Skill Dev, OT Community Reintegration, OT Manual Ther Technique, OT Neuro Re-Ed/Balance, OT Therapeutic Activity, OT Evaluation, and OT Therapeutic Exercise

## 2021-01-16 NOTE — THERAPY
Speech Language Pathology  Daily Treatment     Patient Name: Luis Sepulveda  Age:  75 y.o., Sex:  male  Medical Record #: [id number]  Today's Date: 1/16/2021     Precautions  Precautions: Fall Risk, Non Weight Bearing Right Lower Extremity  Comments: Dialysis Tues, Thurs, Sat; L arm fistula; previously used port. R BKA    Subjective    Pt pleasant and cooperative.       Objective       01/16/21 0901   Interdisciplinary Plan of Care Collaboration   IDT Collaboration with  Certified Nursing Assistant   Patient Position at End of Therapy Seated;Chair Alarm On;Call Light within Reach;Phone within Reach;Tray Table within Reach   Collaboration Comments CLOF   SLP Total Time Spent   SLP Individual Total Time Spent (Mins) 60   Treatment Charges   SLP Cognitive Skill Development First 15 Minutes 1   SLP Cognitive Skill Development Additional 15 Minutes 3       Assessment    Pt completed funxl med sort with current medications w/ 100% acc and MIN verbal cues. Pt struggling with fine motor or placing mock pills into pill box slots, however, IDing and correcting errors IND. Memory log explained and initiated as HW for patient. Not completed during session d/t time constraints.         Plan    Complete repeat, funxl med sort, funxl memory tasks.     Speech Therapy Problems     Problem: Memory STGs     Dates: Start: 01/14/21       Goal: STG-Within one week, patient will     Dates: Start: 01/14/21       Description: 1) Individualized goal:  complete verbal recall tasks with brief (5-10 minute) delay with 70% accuracy provided MIN cues.   2) Interventions:  SLP Self Care / ADL Training , SLP Cognitive Skill Development, and SLP Group Treatment                Problem: Problem Solving STGs     Dates: Start: 01/14/21       Goal: STG-Within one week, patient will     Dates: Start: 01/14/21       Description: 1) Individualized goal:  complete complex attention tasks involving medication management with 80% accuracy provided SPV.    2) Interventions:  SLP Self Care / ADL Training , SLP Cognitive Skill Development, and SLP Group Treatment                Problem: Speech/Swallowing LTGs     Dates: Start: 01/14/21       Goal: LTG-By discharge, patient will     Dates: Start: 01/14/21       Description: 1) Individualized goal:  complete functional problem solving and recall information with 80% accuracy and MOD I.   2) Interventions:  SLP Self Care / ADL Training , SLP Cognitive Skill Development, and SLP Group Treatment

## 2021-01-16 NOTE — CARE PLAN
Problem: Bowel/Gastric:  Goal: Normal bowel function is maintained or improved  Outcome: PROGRESSING AS EXPECTED  Note: Pt continent of bowel. He refused his scheduled senna tonight. LBM 1/15/21.     Problem: Urinary Elimination:  Goal: Ability to reestablish a normal urinary elimination pattern will improve  Outcome: PROGRESSING SLOWER THAN EXPECTED  Note: Pt continent of bladder. He's on bladder med (flomax). Voiding adequately in the BR. He still has high PVRs. Scanned for 420ml at midnoc but pt refused ICP and refused to revoid. Will continue to monitor.

## 2021-01-16 NOTE — THERAPY
"Physical Therapy   Daily Treatment     Patient Name: Luis Sepulveda  Age:  75 y.o., Sex:  male  Medical Record #: [id number]  Today's Date: 1/16/2021     Precautions  Precautions: (P) Fall Risk, Non Weight Bearing Right Lower Extremity  Comments: (P) Dialysis Tues, Thurs, Sat; L arm fistula; previously used port. R BKA    Subjective    Pt pleasant and cooperative and  Agreeable to therapy.      Objective      01/16/21 1231   Precautions   Precautions Fall Risk;Non Weight Bearing Right Lower Extremity   Comments Dialysis Tues, Thurs, Sat; L arm fistula; previously used port. R BKA   Gait Functional Level of Assist    Gait Level Of Assist Maximal Assist   Assistive Device Front Wheel Walker  (w/c follow )   Distance (Feet) 10   # of Times Distance was Traveled 1   Deviation   (hop to gait)   Bed Mobility    Supine to Sit Stand by Assist   Sit to Supine Stand by Assist   Sit to Stand Contact Guard Assist   Scooting Supervised   PT Total Time Spent   PT Individual Total Time Spent (Mins) 60   PT Charge Group   PT Therapeutic Exercise 4     Pt education: discussed contracture prevention for hip and knee     Therapeutic exercise:  - prone on bed 2 min with rolled towel under right thigh   - prone RLE knee flexion 2 x 15  - prone RLE hip extension 2 x 10   - side lying on left, RLE hip abduction 2 x 10   - supine glute sets 10\" x 10   - supine right knee to chest stretch 2 x 30\"   - supine right LE hamstring stretch 2 x 30\"   - standing RLE hip flexion x 10, hip abduction x 10, hip extension x 10   - sit to stand x 3       Gait training: FWW, hop to gait min A for balance, max A due to w/c follow for safety 10 ft. Pt fatigues quickly and needs to sit.     Assessment    Extra time required for bed mobility and assistance for set up. Pt verbalizes good understanding of reason for exercises and is willing and motivated to participate. Prone on hospital bed was a little uncomfortable.     Strengths: Pleasant and " cooperative, Able to follow instructions, Independent prior level of function, Motivated for self care and independence, Willingly participates in therapeutic activities  Barriers: Decreased endurance, Generalized weakness, Impaired balance, Pain    Plan    Continue LE ROM and strength, standing balance, gait as tolerated.     Physical Therapy Problems     Problem: Mobility     Dates: Start: 01/14/21       Goal: STG-Within one week, patient will propel wheelchair community     Dates: Start: 01/14/21       Description: 1) Individualized goal:  150 ft At mod I in order to improve activity tolerance propelling himself around facility  2) Interventions:  PT Gait Training, PT Therapeutic Exercises, PT Neuro Re-Ed/Balance, PT Therapeutic Activity, PT Manual Therapy, and PT Evaluation                  Problem: Mobility Transfers     Dates: Start: 01/14/21       Goal: STG-Within one week, patient will transfer bed to chair     Dates: Start: 01/14/21       Description: 1) Individualized goal:  At min A with LRD In order to maximize self mobility  2) Interventions:  PT Gait Training, PT Therapeutic Exercises, PT Neuro Re-Ed/Balance, PT Therapeutic Activity, PT Manual Therapy, and PT Evaluation                Problem: PT-Long Term Goals     Dates: Start: 01/14/21       Goal: LTG-By discharge, patient will propel wheelchair     Dates: Start: 01/14/21       Description: 1) Individualized goal:  150 ft At mod I indoor and outdoor in order to improve activity tolerance propelling himself around facility  2) Interventions:  PT Gait Training, PT Therapeutic Exercises, PT Neuro Re-Ed/Balance, PT Therapeutic Activity, PT Manual Therapy, and PT Evaluation          Goal: LTG-By discharge, patient will transfer one surface to another     Dates: Start: 01/14/21       Description: 1) Individualized goal:  At mod I with LRD In order to maximize self mobility  2) Interventions:  PT Gait Training, PT Therapeutic Exercises, PT Neuro  Re-Ed/Balance, PT Therapeutic Activity, PT Manual Therapy, and PT Evaluation          Goal: LTG-By discharge, patient will transfer in/out of a car     Dates: Start: 01/14/21       Description: 1) Individualized goal:  At SPV with LRD In order to maximize self mobility  2) Interventions:  PT Gait Training, PT Therapeutic Exercises, PT Neuro Re-Ed/Balance, PT Therapeutic Activity, PT Manual Therapy, and PT Evaluation

## 2021-01-16 NOTE — PROGRESS NOTES
HS [id number]. No insulin coverage per sliding scale per MAR. HS snack provided and consumed. Will continue to monitor.

## 2021-01-16 NOTE — PROGRESS NOTES
[facility] Progress Note    Hemodialysis treatment ordered today per Dr. Hunter x 3.5 hours.   Treatment initiated at 1423.     Cannulated arterial site on LUE AVF x 2 d/t poor blood flow/small clot aspirated on first attempt.   Fluid removal limited by low BP.    1500: 95/50 HR 76  1632: 89/49 HR 77    See paper flow sheet for details.     Net UF 1,500 mL.     Needles removed from access site. Dressings applied and sites held x 10-15 minutes; verified no bleeding. Positive bruit/thrill post tx.   Staff RN to monitor AVF/G for breakthrough bleeding. Should breakthrough bleeding occur, staff RN to apply pressure to access sites until bleeding resolved.   Notify Dialysis and Nephrologist for follow-up.    Report given to Primary RN.

## 2021-01-17 NOTE — WOUND TEAM
Wound Team consulted for right heel wound. There is a bump over the calcaneal tendon that is typically caused by constant friction from shoes. There is a wound over this bump. Patient states that he has not been wearing shoes so it is most likely from pressure on the bed. There is already an order in place for heel float boot and heel mepiex to the left foot. There is no wound to the penis.               Wound 01/13/21 Pressure Injury Heel Posterior;Upper Left over a bump on calcaneal tendon POA (Active)   Wound Image   01/13/21 1800   Site Assessment Red;Pale 01/17/21 1115   Periwound Assessment Blanchable erythema 01/17/21 1115   Margins Attached edges;Defined edges 01/17/21 1115   Closure Adhesive bandage 01/17/21 1115   Drainage Amount Small 01/17/21 1115   Drainage Description Yellow 01/17/21 1115   Treatments Site care;Cleansed 01/17/21 1115   Wound Cleansing Normal Saline Irrigation 01/17/21 1115   Periwound Protectant Not Applicable 01/17/21 1115   Dressing Cleansing/Solutions Not Applicable 01/17/21 1115   Dressing Options Mepilex Heel 01/17/21 1115   Dressing Changed Observed 01/17/21 1115   Dressing Status Intact 01/17/21 1115   Dressing Change/Treatment Frequency Every 72 hrs, and As Needed 01/17/21 1115   NEXT Dressing Change/Treatment Date 01/20/21 01/17/21 1115   NEXT Weekly Photo (Inpatient Only) 01/23/21 01/17/21 1115   Pressure Injury Stage 2 01/17/21 1115   Non-staged Wound Description Not applicable 01/17/21 1115   Wound Length (cm) 1 cm 01/17/21 1115   Wound Width (cm) 0.6 cm 01/17/21 1115   Wound Depth (cm) 0.2 cm 01/17/21 1115   Wound Surface Area (cm^2) 0.6 cm^2 01/17/21 1115   Wound Volume (cm^3) 0.12 cm^3 01/17/21 1115   Wound Bed Granulation (%) 0 % 01/17/21 1115   Wound Bed Epithelium (%) 0 % 01/17/21 1115   Wound Bed Slough (%) 0 % 01/17/21 1115   Wound Bed Eschar (%) 0 % 01/17/21 1115   Tunneling (cm) 0 cm 01/17/21 1115   Undermining (cm) 0 cm 01/17/21 1115

## 2021-01-17 NOTE — PROGRESS NOTES
"Rehab Progress Note     Date of Service: 1/17/2021  Chief Complaint: Follow-up BKA    Interval Events (Subjective)    Patient seen and examined today in his room.  He continues to retain urine and is resistant to being cath.  I sat down with the patient and showed him a schematic of his bladder and kidneys.  Explained the concern for hydronephrosis, bladder infection, kidney infection, bacteremia.  He is agreeable to indwelling catheter for some bladder rest.  He prefers this over intermittent caths.  He does report history of BPH.  I will refer him to urology as an outpatient.    Objective:  VITAL SIGNS: BP (!) 97/47   Pulse 69   Temp 36.8 °C (98.2 °F) (Oral)   Resp 17   Ht 1.651 m (5' 5\")   Wt 73 kg (160 lb 15 oz)   SpO2 97%   BMI 26.78 kg/m²   Gen: alert, no apparent distress  Msk: Right transtibial amputation    Recent Results (from the past 72 hour(s))   URINALYSIS    Collection Time: 01/14/21  3:00 PM    Specimen: Urine, Cath   Result Value Ref Range    Color Yellow     Character Clear     Specific Gravity 1.009 <1.035    Ph 8.5 (A) 5.0 - 8.0    Glucose 250 (A) Negative mg/dL    Ketones Negative Negative mg/dL    Protein 100 (A) Negative mg/dL    Bilirubin Negative Negative    Urobilinogen, Urine 0.2 Negative    Nitrite Negative Negative    Leukocyte Esterase Negative Negative    Occult Blood Trace (A) Negative    Micro Urine Req Microscopic    URINE MICROSCOPIC (W/UA)    Collection Time: 01/14/21  3:00 PM   Result Value Ref Range    WBC 0-2 (A) /hpf    RBC 2-5 (A) /hpf    Bacteria Negative None /hpf    Epithelial Cells Negative /hpf    Hyaline Cast 0-2 /lpf   ACCU-CHEK GLUCOSE    Collection Time: 01/14/21  5:23 PM   Result Value Ref Range    Glucose - Accu-Ck 124 (H) 65 - 99 mg/dL   ACCU-CHEK GLUCOSE    Collection Time: 01/14/21  8:45 PM   Result Value Ref Range    Glucose - Accu-Ck 138 (H) 65 - 99 mg/dL   HEMOGLOBIN A1C    Collection Time: 01/15/21  6:37 AM   Result Value Ref Range    Glycohemoglobin " 5.2 0.0 - 5.6 %    Est Avg Glucose 103 mg/dL   IRON/TOTAL IRON BIND    Collection Time: 01/15/21  6:37 AM   Result Value Ref Range    Iron 65 50 - 180 ug/dL    Total Iron Binding 187 (L) 250 - 450 ug/dL    Unsat Iron Binding 122 110 - 370 ug/dL    % Saturation 35 15 - 55 %   FERRITIN    Collection Time: 01/15/21  6:37 AM   Result Value Ref Range    Ferritin 1005.0 (H) 22.0 - 322.0 ng/mL   PTH INTACT (PTH ONLY)    Collection Time: 01/15/21  6:37 AM   Result Value Ref Range    Pth, Intact 109.0 (H) 14.0 - 72.0 pg/mL   Renal Function Panel    Collection Time: 01/15/21  6:37 AM   Result Value Ref Range    Sodium 134 (L) 135 - 145 mmol/L    Potassium 3.6 3.6 - 5.5 mmol/L    Chloride 95 (L) 96 - 112 mmol/L    Co2 26 20 - 33 mmol/L    Glucose 225 (H) 65 - 99 mg/dL    Creatinine 4.30 (H) 0.50 - 1.40 mg/dL    Bun 13 8 - 22 mg/dL    Calcium 9.2 8.5 - 10.5 mg/dL    Phosphorus 1.5 (L) 2.5 - 4.5 mg/dL    Albumin 3.5 3.2 - 4.9 g/dL   FOLATE    Collection Time: 01/15/21  6:37 AM   Result Value Ref Range    Folate -Folic Acid 8.5 >4.0 ng/mL   VITAMIN B12    Collection Time: 01/15/21  6:37 AM   Result Value Ref Range    Vitamin B12 -True Cobalamin 852 211 - 911 pg/mL   PROCALCITONIN    Collection Time: 01/15/21  6:37 AM   Result Value Ref Range    Procalcitonin 11.33 (H) <0.25 ng/mL   ESTIMATED GFR    Collection Time: 01/15/21  6:37 AM   Result Value Ref Range    GFR If  16 (A) >60 mL/min/1.73 m 2    GFR If Non  13 (A) >60 mL/min/1.73 m 2   ACCU-CHEK GLUCOSE    Collection Time: 01/15/21  7:52 AM   Result Value Ref Range    Glucose - Accu-Ck 199 (H) 65 - 99 mg/dL   ACCU-CHEK GLUCOSE    Collection Time: 01/15/21 11:25 AM   Result Value Ref Range    Glucose - Accu-Ck 193 (H) 65 - 99 mg/dL   ACCU-CHEK GLUCOSE    Collection Time: 01/15/21  4:50 PM   Result Value Ref Range    Glucose - Accu-Ck 208 (H) 65 - 99 mg/dL   ACCU-CHEK GLUCOSE    Collection Time: 01/15/21  9:16 PM   Result Value Ref Range    Glucose  - Accu-Ck 157 (H) 65 - 99 mg/dL   ACCU-CHEK GLUCOSE    Collection Time: 01/16/21  7:37 AM   Result Value Ref Range    Glucose - Accu-Ck 167 (H) 65 - 99 mg/dL   ACCU-CHEK GLUCOSE    Collection Time: 01/16/21 10:52 AM   Result Value Ref Range    Glucose - Accu-Ck 187 (H) 65 - 99 mg/dL   ACCU-CHEK GLUCOSE    Collection Time: 01/16/21  5:54 PM   Result Value Ref Range    Glucose - Accu-Ck 125 (H) 65 - 99 mg/dL   ACCU-CHEK GLUCOSE    Collection Time: 01/16/21  8:04 PM   Result Value Ref Range    Glucose - Accu-Ck 193 (H) 65 - 99 mg/dL   Comp Metabolic Panel    Collection Time: 01/17/21  4:44 AM   Result Value Ref Range    Sodium 133 (L) 135 - 145 mmol/L    Potassium 3.8 3.6 - 5.5 mmol/L    Chloride 95 (L) 96 - 112 mmol/L    Co2 27 20 - 33 mmol/L    Anion Gap 11.0 7.0 - 16.0    Glucose 163 (H) 65 - 99 mg/dL    Bun 19 8 - 22 mg/dL    Creatinine 4.23 (H) 0.50 - 1.40 mg/dL    Calcium 9.1 8.5 - 10.5 mg/dL    AST(SGOT) 19 12 - 45 U/L    ALT(SGPT) 10 2 - 50 U/L    Alkaline Phosphatase 106 (H) 30 - 99 U/L    Total Bilirubin 0.3 0.1 - 1.5 mg/dL    Albumin 3.3 3.2 - 4.9 g/dL    Total Protein 6.3 6.0 - 8.2 g/dL    Globulin 3.0 1.9 - 3.5 g/dL    A-G Ratio 1.1 g/dL   CBC WITH DIFFERENTIAL    Collection Time: 01/17/21  4:44 AM   Result Value Ref Range    WBC 10.1 4.8 - 10.8 K/uL    RBC 2.72 (L) 4.70 - 6.10 M/uL    Hemoglobin 8.5 (L) 14.0 - 18.0 g/dL    Hematocrit 27.2 (L) 42.0 - 52.0 %    .0 (H) 81.4 - 97.8 fL    MCH 31.3 27.0 - 33.0 pg    MCHC 31.3 (L) 33.7 - 35.3 g/dL    RDW 58.2 (H) 35.9 - 50.0 fL    Platelet Count 341 164 - 446 K/uL    MPV 9.4 9.0 - 12.9 fL    Neutrophils-Polys 67.10 44.00 - 72.00 %    Lymphocytes 14.70 (L) 22.00 - 41.00 %    Monocytes 13.10 0.00 - 13.40 %    Eosinophils 0.10 0.00 - 6.90 %    Basophils 0.60 0.00 - 1.80 %    Immature Granulocytes 4.40 (H) 0.00 - 0.90 %    Nucleated RBC 0.30 /100 WBC    Neutrophils (Absolute) 6.75 1.82 - 7.42 K/uL    Lymphs (Absolute) 1.48 1.00 - 4.80 K/uL    Monos (Absolute)  1.32 (H) 0.00 - 0.85 K/uL    Eos (Absolute) 0.01 0.00 - 0.51 K/uL    Baso (Absolute) 0.06 0.00 - 0.12 K/uL    Immature Granulocytes (abs) 0.44 (H) 0.00 - 0.11 K/uL    NRBC (Absolute) 0.03 K/uL   ESTIMATED GFR    Collection Time: 01/17/21  4:44 AM   Result Value Ref Range    GFR If  17 (A) >60 mL/min/1.73 m 2    GFR If Non  14 (A) >60 mL/min/1.73 m 2   ACCU-CHEK GLUCOSE    Collection Time: 01/17/21  7:48 AM   Result Value Ref Range    Glucose - Accu-Ck 143 (H) 65 - 99 mg/dL   ACCU-CHEK GLUCOSE    Collection Time: 01/17/21 11:06 AM   Result Value Ref Range    Glucose - Accu-Ck 203 (H) 65 - 99 mg/dL       Current Facility-Administered Medications   Medication Frequency   • metoprolol (LOPRESSOR) tablet 25 mg TWICE DAILY   • insulin glargine (Lantus) injection QAM INSULIN   • tamsulosin (FLOMAX) capsule 0.8 mg QHS   • fluconazole (DIFLUCAN) tablet 200 mg DAILY   • insulin regular (HumuLIN R,NovoLIN R) injection 4X/DAY ACHS    And   • dextrose 50% (D50W) injection 50 mL Q15 MIN PRN   • gabapentin (NEURONTIN) capsule 300 mg DAILY   • heparin injection 1,800 Units DIALYSIS PRN   • hydrOXYzine HCl (ATARAX) tablet 50 mg Q6HRS PRN   • melatonin tablet 3 mg HS PRN   • Respiratory Therapy Consult Continuous RT   • Pharmacy Consult Request ...Pain Management Review 1 Each PHARMACY TO DOSE   • acetaminophen (Tylenol) tablet 650 mg Q4HRS PRN   • lactulose 20 GM/30ML solution 30 mL QDAY PRN   • docusate sodium (ENEMEEZ) enema 283 mg QDAY PRN   • fleet enema 133 mL QDAY PRN   • artificial tears ophthalmic solution 1 Drop PRN   • benzocaine-menthol (CEPACOL) lozenge 1 Lozenge Q2HRS PRN   • mag hydrox-al hydrox-simeth (MAALOX PLUS ES or MYLANTA DS) suspension 20 mL Q2HRS PRN   • ondansetron (ZOFRAN ODT) dispertab 4 mg 4X/DAY PRN    Or   • ondansetron (ZOFRAN) syringe/vial injection 4 mg 4X/DAY PRN   • traZODone (DESYREL) tablet 50 mg QHS PRN   • sodium chloride (OCEAN) 0.65 % nasal spray 2 Spray PRN   •  traMADol (ULTRAM) 50 MG tablet 50 mg Q4HRS PRN   • atorvastatin (LIPITOR) tablet 40 mg Q EVENING   • calcitRIOL (ROCALTROL) capsule 0.25 mcg DAILY   • epoetin (Retacrit) injection (Dialysis use only) 10,000 Units TUE+THU+SAT   • methimazole (TAPAZOLE) tablet 5 mg Q EVENING   • methimazole (TAPAZOLE) tablet 7.5 mg QAM   • omeprazole (PRILOSEC) capsule 20 mg DAILY   • oxyCODONE immediate-release (ROXICODONE) tablet 5 mg Q4HRS PRN   • senna-docusate (PERICOLACE or SENOKOT S) 8.6-50 MG per tablet 2 Tab BID    And   • magnesium hydroxide (MILK OF MAGNESIA) suspension 30 mL QDAY PRN    And   • bisacodyl (DULCOLAX) suppository 10 mg QDAY PRN   • apixaban (ELIQUIS) tablet 2.5 mg BID       Orders Placed This Encounter   Procedures   • Diet Order Diet: Renal; Second Modifier: (optional): Consistent CHO (Diabetic)     Standing Status:   Standing     Number of Occurrences:   1     Order Specific Question:   Diet:     Answer:   Renal [8]     Order Specific Question:   Second Modifier: (optional)     Answer:   Consistent CHO (Diabetic) [4]       Assessment:  Active Hospital Problems    Diagnosis   • *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)   • Leukocytosis   • Urinary retention   • Paroxysmal A-fib (HCC)   • End stage renal disease on dialysis (HCC)   • Type 2 diabetes mellitus with kidney complication, with long-term current use of insulin (HCC)   • Essential hypertension   • Anemia   • Hyperthyroidism   • Dyslipidemia   • Hypokalemia   • Impaired mobility and ADLs     This patient is a 75 y.o. male admitted for acute inpatient rehabilitation with 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).    We will have his first weekly conference on Monday to discuss a discharge date.    Follow up: PCP, nephrology, new urology referral, Dr. Elliott, surgery    Medical Decision Making and Plan:    Right BRANNON  12/31 Dr. Lynn  Continue full rehab program  PT/OT/SLP, 1 hr each discipline, 5 days per  week  Outpatient follow-up for suture removal after discharge    Phantom pain, improved  Continue gabapentin 300 mg, maximum due to renal failure     Encephalopathy, resolved  Speech therapy for cognitive assessment  Currently only with mild memory deficits     Urinary retention, continues  Patient reports he is oliguric at baseline, but has only been on dialysis for one year  Has very high residuals  Continue intermittent caths for over 400 cc, patient refusing  Started Flomax 0.4 mg 1/14 --> 0.8 mg 1/16  PRN lidocaine jelly for caths  Place indwelling catheter for bladder rest 1/17  Referral to urology    Bowel program  History of diabetic gastroparesis  Continue bowel medications  Scheduled Sennakot  PRN Miralax, MOM, bisacodyl suppository  Last BM 1/17    DVT prophylaxis  Eliquis     Appreciate the assistance of the hospitalist with his medical comorbidities:     End-stage renal disease on hemodialysis  Consulted nephrology  Continue calcitriol 0.25 mcg daily  Continue Retacrit with dialysis  Continue K-Phos 250 mg 2 times daily     Diabetes with hyperglycemia  Sliding scale insulin  Lantus 5 units daily     Peripheral artery disease  Continue Eliquis, renal dosing  Continue statin     Coronary artery disease  With history of stents  Continue statin     Paroxysmal atrial fibrillation  Continue Eliquis, renal dosing  Continue metoprolol 37.5 mg twice daily --> dose decreased to 25 mg twice daily     Hypertension  Continue metoprolol 37.5 mg twice daily --> dose decreased to 25 mg twice daily     Hyperlipidemia  Continue statin     Hyperthyroidism  Continue methimazole 7.5 mg in the morning, 5 mg in the evening     GERD  Continue omeprazole 20 mg     Anemia  Likely of chronic renal disease  Continue Retacrit     Leukocytosis, resolved  Recently treated with IV antibiotics for pneumonia    Elevated procalcitonin, improved  Recently treated with IV antibiotics for pneumonia    Penile discharge  Culture with yeast,  continue Diflucan    COVID negative 1/11, re-screen 1/17 pending    Total time:  36 minutes.  I spent greater than 50% of the time for patient care, counseling, and coordination on this date, including patient face-to face time, unit/floor time with review of records/pertinent lab data and studies, as well as discussing diagnostic evaluation/work up, planned therapeutic interventions, and future disposition of care, as per the interval events/subjective and the assessment and plan as noted above.    I have performed a physical exam, reviewed and updated ROS, as well as the assessment and plan today 1/17/2021. In review of note from 1/16/21 there are no new changes except as documented above.            Lata Goodman M.D.   Physical Medicine and Rehabilitation

## 2021-01-17 NOTE — PROGRESS NOTES
"Rehab Progress Note     Date of Service: 1/16/2021  Chief Complaint: Follow-up BKA    Interval Events (Subjective)    Patient seen and examined today as he is heading out of his room to dialysis.  Today he denies any pain in his residual limb including phantom pain.  Patient culture result did come back with yeast which was discussed with the hospitalist.  Retest for Covid will be tomorrow.  Patient continues to have some urinary retention.  Patient complains about having to do dialysis.  Advised him he does not need to continue if he does not want but that without it he would die.  He understands this and reports as long as he can get a leg eventually he will feel like he has a good quality of life and can tolerate dialysis.  He just does not like how much time it takes up.     Objective:  VITAL SIGNS: BP (!) 95/50   Pulse 76   Temp 36.7 °C (98.1 °F) (Temporal)   Resp 18   Ht 1.651 m (5' 5\")   Wt 73 kg (160 lb 15 oz)   SpO2 97%   BMI 26.78 kg/m²   Gen: alert, no apparent distress  Msk: Right transtibial amputation    Recent Results (from the past 72 hour(s))   ACCU-CHEK GLUCOSE    Collection Time: 01/13/21  5:28 PM   Result Value Ref Range    Glucose - Accu-Ck 146 (H) 65 - 99 mg/dL   CULTURE WOUND W/ GRAM STAIN    Collection Time: 01/13/21  6:15 PM    Specimen: Penis; Wound   Result Value Ref Range    Significant Indicator POS (POS)     Source WND     Site PENIS     Culture Result Rare growth usual urogenital xander including: (A)     Gram Stain Result No organisms seen.     Culture Result Yeast  Rare growth   (A)    GRAM STAIN    Collection Time: 01/13/21  6:15 PM    Specimen: Wound   Result Value Ref Range    Significant Indicator .     Source WND     Site PENIS     Gram Stain Result No organisms seen.    ACCU-CHEK GLUCOSE    Collection Time: 01/13/21  8:11 PM   Result Value Ref Range    Glucose - Accu-Ck 207 (H) 65 - 99 mg/dL   CBC with Differential    Collection Time: 01/14/21  5:17 AM   Result Value Ref " Range    WBC 9.8 4.8 - 10.8 K/uL    RBC 2.80 (L) 4.70 - 6.10 M/uL    Hemoglobin 8.7 (L) 14.0 - 18.0 g/dL    Hematocrit 27.9 (L) 42.0 - 52.0 %    MCV 99.6 (H) 81.4 - 97.8 fL    MCH 31.1 27.0 - 33.0 pg    MCHC 31.2 (L) 33.7 - 35.3 g/dL    RDW 57.1 (H) 35.9 - 50.0 fL    Platelet Count 337 164 - 446 K/uL    MPV 8.9 (L) 9.0 - 12.9 fL    Neutrophils-Polys 71.60 44.00 - 72.00 %    Lymphocytes 14.70 (L) 22.00 - 41.00 %    Monocytes 8.70 0.00 - 13.40 %    Eosinophils 0.10 0.00 - 6.90 %    Basophils 0.70 0.00 - 1.80 %    Immature Granulocytes 4.20 (H) 0.00 - 0.90 %    Nucleated RBC 0.00 /100 WBC    Neutrophils (Absolute) 7.01 1.82 - 7.42 K/uL    Lymphs (Absolute) 1.44 1.00 - 4.80 K/uL    Monos (Absolute) 0.85 0.00 - 0.85 K/uL    Eos (Absolute) 0.01 0.00 - 0.51 K/uL    Baso (Absolute) 0.07 0.00 - 0.12 K/uL    Immature Granulocytes (abs) 0.41 (H) 0.00 - 0.11 K/uL    NRBC (Absolute) 0.00 K/uL   Comp Metabolic Panel (CMP)    Collection Time: 01/14/21  5:17 AM   Result Value Ref Range    Sodium 133 (L) 135 - 145 mmol/L    Potassium 3.1 (L) 3.6 - 5.5 mmol/L    Chloride 95 (L) 96 - 112 mmol/L    Co2 26 20 - 33 mmol/L    Anion Gap 12.0 7.0 - 16.0    Glucose 142 (H) 65 - 99 mg/dL    Bun 27 (H) 8 - 22 mg/dL    Creatinine 6.59 (HH) 0.50 - 1.40 mg/dL    Calcium 9.0 8.5 - 10.5 mg/dL    AST(SGOT) 12 12 - 45 U/L    ALT(SGPT) <5 2 - 50 U/L    Alkaline Phosphatase 85 30 - 99 U/L    Total Bilirubin 0.3 0.1 - 1.5 mg/dL    Albumin 3.2 3.2 - 4.9 g/dL    Total Protein 6.2 6.0 - 8.2 g/dL    Globulin 3.0 1.9 - 3.5 g/dL    A-G Ratio 1.1 g/dL   Magnesium    Collection Time: 01/14/21  5:17 AM   Result Value Ref Range    Magnesium 1.9 1.5 - 2.5 mg/dL   Vitamin D, 25-hydroxy (blood)    Collection Time: 01/14/21  5:17 AM   Result Value Ref Range    25-Hydroxy   Vitamin D 25 48 30 - 100 ng/mL   Phosphorus    Collection Time: 01/14/21  5:17 AM   Result Value Ref Range    Phosphorus 2.5 2.5 - 4.5 mg/dL   ESTIMATED GFR    Collection Time: 01/14/21  5:17 AM    Result Value Ref Range    GFR If African American 10 (A) >60 mL/min/1.73 m 2    GFR If Non  8 (A) >60 mL/min/1.73 m 2   ACCU-CHEK GLUCOSE    Collection Time: 01/14/21  8:09 AM   Result Value Ref Range    Glucose - Accu-Ck 153 (H) 65 - 99 mg/dL   ACCU-CHEK GLUCOSE    Collection Time: 01/14/21 11:13 AM   Result Value Ref Range    Glucose - Accu-Ck 133 (H) 65 - 99 mg/dL   URINALYSIS    Collection Time: 01/14/21  3:00 PM    Specimen: Urine, Cath   Result Value Ref Range    Color Yellow     Character Clear     Specific Gravity 1.009 <1.035    Ph 8.5 (A) 5.0 - 8.0    Glucose 250 (A) Negative mg/dL    Ketones Negative Negative mg/dL    Protein 100 (A) Negative mg/dL    Bilirubin Negative Negative    Urobilinogen, Urine 0.2 Negative    Nitrite Negative Negative    Leukocyte Esterase Negative Negative    Occult Blood Trace (A) Negative    Micro Urine Req Microscopic    URINE MICROSCOPIC (W/UA)    Collection Time: 01/14/21  3:00 PM   Result Value Ref Range    WBC 0-2 (A) /hpf    RBC 2-5 (A) /hpf    Bacteria Negative None /hpf    Epithelial Cells Negative /hpf    Hyaline Cast 0-2 /lpf   ACCU-CHEK GLUCOSE    Collection Time: 01/14/21  5:23 PM   Result Value Ref Range    Glucose - Accu-Ck 124 (H) 65 - 99 mg/dL   ACCU-CHEK GLUCOSE    Collection Time: 01/14/21  8:45 PM   Result Value Ref Range    Glucose - Accu-Ck 138 (H) 65 - 99 mg/dL   HEMOGLOBIN A1C    Collection Time: 01/15/21  6:37 AM   Result Value Ref Range    Glycohemoglobin 5.2 0.0 - 5.6 %    Est Avg Glucose 103 mg/dL   IRON/TOTAL IRON BIND    Collection Time: 01/15/21  6:37 AM   Result Value Ref Range    Iron 65 50 - 180 ug/dL    Total Iron Binding 187 (L) 250 - 450 ug/dL    Unsat Iron Binding 122 110 - 370 ug/dL    % Saturation 35 15 - 55 %   FERRITIN    Collection Time: 01/15/21  6:37 AM   Result Value Ref Range    Ferritin 1005.0 (H) 22.0 - 322.0 ng/mL   PTH INTACT (PTH ONLY)    Collection Time: 01/15/21  6:37 AM   Result Value Ref Range    Pth,  Intact 109.0 (H) 14.0 - 72.0 pg/mL   Renal Function Panel    Collection Time: 01/15/21  6:37 AM   Result Value Ref Range    Sodium 134 (L) 135 - 145 mmol/L    Potassium 3.6 3.6 - 5.5 mmol/L    Chloride 95 (L) 96 - 112 mmol/L    Co2 26 20 - 33 mmol/L    Glucose 225 (H) 65 - 99 mg/dL    Creatinine 4.30 (H) 0.50 - 1.40 mg/dL    Bun 13 8 - 22 mg/dL    Calcium 9.2 8.5 - 10.5 mg/dL    Phosphorus 1.5 (L) 2.5 - 4.5 mg/dL    Albumin 3.5 3.2 - 4.9 g/dL   FOLATE    Collection Time: 01/15/21  6:37 AM   Result Value Ref Range    Folate -Folic Acid 8.5 >4.0 ng/mL   VITAMIN B12    Collection Time: 01/15/21  6:37 AM   Result Value Ref Range    Vitamin B12 -True Cobalamin 852 211 - 911 pg/mL   PROCALCITONIN    Collection Time: 01/15/21  6:37 AM   Result Value Ref Range    Procalcitonin 11.33 (H) <0.25 ng/mL   ESTIMATED GFR    Collection Time: 01/15/21  6:37 AM   Result Value Ref Range    GFR If  16 (A) >60 mL/min/1.73 m 2    GFR If Non  13 (A) >60 mL/min/1.73 m 2   ACCU-CHEK GLUCOSE    Collection Time: 01/15/21  7:52 AM   Result Value Ref Range    Glucose - Accu-Ck 199 (H) 65 - 99 mg/dL   ACCU-CHEK GLUCOSE    Collection Time: 01/15/21 11:25 AM   Result Value Ref Range    Glucose - Accu-Ck 193 (H) 65 - 99 mg/dL   ACCU-CHEK GLUCOSE    Collection Time: 01/15/21  4:50 PM   Result Value Ref Range    Glucose - Accu-Ck 208 (H) 65 - 99 mg/dL   ACCU-CHEK GLUCOSE    Collection Time: 01/15/21  9:16 PM   Result Value Ref Range    Glucose - Accu-Ck 157 (H) 65 - 99 mg/dL       Current Facility-Administered Medications   Medication Frequency   • insulin glargine (Lantus) injection QAM INSULIN   • tamsulosin (FLOMAX) capsule 0.8 mg QHS   • fluconazole (DIFLUCAN) tablet 200 mg DAILY   • insulin regular (HumuLIN R,NovoLIN R) injection 4X/DAY ACHS    And   • dextrose 50% (D50W) injection 50 mL Q15 MIN PRN   • gabapentin (NEURONTIN) capsule 300 mg DAILY   • phosphorus (K-Phos-Neutral) per tablet 250 mg BID   • heparin  injection 1,800 Units DIALYSIS PRN   • hydrOXYzine HCl (ATARAX) tablet 50 mg Q6HRS PRN   • melatonin tablet 3 mg HS PRN   • Respiratory Therapy Consult Continuous RT   • Pharmacy Consult Request ...Pain Management Review 1 Each PHARMACY TO DOSE   • acetaminophen (Tylenol) tablet 650 mg Q4HRS PRN   • lactulose 20 GM/30ML solution 30 mL QDAY PRN   • docusate sodium (ENEMEEZ) enema 283 mg QDAY PRN   • fleet enema 133 mL QDAY PRN   • artificial tears ophthalmic solution 1 Drop PRN   • benzocaine-menthol (CEPACOL) lozenge 1 Lozenge Q2HRS PRN   • mag hydrox-al hydrox-simeth (MAALOX PLUS ES or MYLANTA DS) suspension 20 mL Q2HRS PRN   • ondansetron (ZOFRAN ODT) dispertab 4 mg 4X/DAY PRN    Or   • ondansetron (ZOFRAN) syringe/vial injection 4 mg 4X/DAY PRN   • traZODone (DESYREL) tablet 50 mg QHS PRN   • sodium chloride (OCEAN) 0.65 % nasal spray 2 Spray PRN   • traMADol (ULTRAM) 50 MG tablet 50 mg Q4HRS PRN   • atorvastatin (LIPITOR) tablet 40 mg Q EVENING   • calcitRIOL (ROCALTROL) capsule 0.25 mcg DAILY   • epoetin (Retacrit) injection (Dialysis use only) 10,000 Units TUE+THU+SAT   • methimazole (TAPAZOLE) tablet 5 mg Q EVENING   • methimazole (TAPAZOLE) tablet 7.5 mg QAM   • metoprolol (LOPRESSOR) tablet 37.5 mg TWICE DAILY   • omeprazole (PRILOSEC) capsule 20 mg DAILY   • oxyCODONE immediate-release (ROXICODONE) tablet 5 mg Q4HRS PRN   • senna-docusate (PERICOLACE or SENOKOT S) 8.6-50 MG per tablet 2 Tab BID    And   • magnesium hydroxide (MILK OF MAGNESIA) suspension 30 mL QDAY PRN    And   • bisacodyl (DULCOLAX) suppository 10 mg QDAY PRN   • apixaban (ELIQUIS) tablet 2.5 mg BID       Orders Placed This Encounter   Procedures   • Diet Order Diet: Renal; Second Modifier: (optional): Consistent CHO (Diabetic)     Standing Status:   Standing     Number of Occurrences:   1     Order Specific Question:   Diet:     Answer:   Renal [8]     Order Specific Question:   Second Modifier: (optional)     Answer:   Consistent CHO  (Diabetic) [4]       Assessment:  Active Hospital Problems    Diagnosis   • *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)   • Leukocytosis   • Urinary retention   • Paroxysmal A-fib (HCC)   • End stage renal disease on dialysis (HCC)   • Type 2 diabetes mellitus with kidney complication, with long-term current use of insulin (HCC)   • Essential hypertension   • Anemia   • Hyperthyroidism   • Dyslipidemia   • Hypokalemia   • Impaired mobility and ADLs     This patient is a 75 y.o. male admitted for acute inpatient rehabilitation with 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).    We will have his first weekly conference on Monday to discuss a discharge date.    Medical Decision Making and Plan:    Right BKA  12/31 Dr. Lynn  Continue full rehab program  PT/OT/SLP, 1 hr each discipline, 5 days per week  Outpatient follow-up for suture removal after discharge    Phantom pain, improved  Started gabapentin 300 mg, maximum due to renal failure     Encephalopathy, resolved  Speech therapy for cognitive assessment  Currently only with mild memory deficits     Urinary retention, continues  Patient oliguric at baseline, but has only been on dialysis for one year  Has high residuals  Continue intermittent caths for over 400 cc  Started Flomax 0.4 mg 1/14 --> 0.8 mg 1/16  PRN lidocaine jelly for caths    Bowel program  History of diabetic gastroparesis  Continue bowel medications  Scheduled Sennakot  PRN Miralax, MOM, bisacodyl suppository  Last BM 1/15    DVT prophylaxis  Eliquis     Appreciate the assistance of the hospitalist with his medical comorbidities:     End-stage renal disease on hemodialysis  Consulted nephrology  Continue calcitriol 0.25 mcg daily  Continue Retacrit with dialysis  Started on K-Phos 250 mg 2 times daily     Diabetes with hyperglycemia  Sliding scale insulin     Peripheral artery disease  Restarted Eliquis, renal dosing  Continue statin     Coronary artery  disease  With history of stents  Continue statin     Paroxysmal atrial fibrillation  Restarted Eliquis, renal dosing  Continue metoprolol 37.5 mg twice daily     Hypertension  Continue metoprolol 37.5 mg twice daily     Hyperlipidemia  Continue statin     Hyperthyroidism  Continue methimazole 7.5 mg in the morning, 5 mg in the evening     GERD  Continue omeprazole 20 mg     Anemia  Likely of chronic renal disease  Continue Retacrit     Leukocytosis, resolved  Recently treated with IV antibiotics for pneumonia    Elevated procalcitonin, improved  Recently treated with IV antibiotics for pneumonia    Penile discharge  Culture with yeast, started on Diflucan    COVID negative 1/11, re-screen 1/17    Total time:  26 minutes.  I spent greater than 50% of the time for patient care, counseling, and coordination on this date, including patient face-to face time, unit/floor time with review of records/pertinent lab data and studies, as well as discussing diagnostic evaluation/work up, planned therapeutic interventions, and future disposition of care, as per the interval events/subjective and the assessment and plan as noted above.    I have performed a physical exam, reviewed and updated ROS, as well as the assessment and plan today 1/16/2021. In review of note from 1/15/21 there are no new changes except as documented above.        Lata Goodman M.D.   Physical Medicine and Rehabilitation

## 2021-01-17 NOTE — CARE PLAN
Problem: Safety  Goal: Will remain free from falls  Outcome: PROGRESSING AS EXPECTED   Pt uses call light consistently and appropriately. Waits for assistance does not attempt self transfer this shift. Able to verbalize needs.   Problem: Infection  Goal: Will remain free from infection  Outcome: PROGRESSING AS EXPECTED   Patient remains free of infection as evidenced by normal vital signs and breath sounds. Will continue monitoring.   Problem: Pain Management  Goal: Pain level will decrease to patient's comfort goal  Outcome: PROGRESSING AS EXPECTED   Patient able to verbalize pain level and verbalize an acceptable level of pain.

## 2021-01-17 NOTE — CARE PLAN
Problem: Urinary Elimination:  Goal: Ability to reestablish a normal urinary elimination pattern will improve  Note: 2218 Bladder scan of 416. Pt on bladder meds. Pt refused to be cathed as per pt it has always been high and dont want staff to drain his bladder. Charge RN informed.

## 2021-01-17 NOTE — CARE PLAN
Problem: Pain Management  Goal: Pain level will decrease to patient's comfort goal  Note: Pt reported right BKA incision pain with pain level of 7/10. Medicated pt with Tramadol with good relief. Pillows provided for comfort. Will continue to assess and monitor for pain     Problem: GLYCEMIA IMBALANCE  Goal: Clinical indication of glycemia balance is achieved  Note: HS FS is 193. No insulin coverage needed. No s/sx of hypo/hyperglycemia noted. HS snack given which consumed 100%. Will continue to assess and monitor for hypo/hyperglycemia.

## 2021-01-17 NOTE — PROGRESS NOTES
[facility] Medicine Daily Progress Note    Date of Service  1/17/2021    Chief Complaint:  Hypertension  Afib  Diabetes  Leukocytosis  Penile wound    Interval History:  No significant events or changes since last visit    Review of Systems  Review of Systems   Constitutional: Negative for fever.   Eyes: Negative for blurred vision.   Respiratory: Negative for cough.    Cardiovascular: Negative for chest pain.   Gastrointestinal: Negative for diarrhea.   Musculoskeletal: Negative for joint pain.   Neurological: Negative for dizziness.   Psychiatric/Behavioral: The patient is not nervous/anxious.         Physical Exam  Temp:  [36.7 °C (98 °F)-37 °C (98.6 °F)] 36.8 °C (98.2 °F)  Pulse:  [69-86] 69  Resp:  [16-20] 17  BP: ()/(41-69) 97/47  SpO2:  [97 %] 97 %    Physical Exam  Vitals signs and nursing note reviewed.   Constitutional:       Appearance: He is not diaphoretic.   HENT:      Mouth/Throat:      Pharynx: No oropharyngeal exudate or posterior oropharyngeal erythema.   Eyes:      Extraocular Movements: Extraocular movements intact.   Neck:      Vascular: No carotid bruit.   Cardiovascular:      Rate and Rhythm: Normal rate and regular rhythm.   Pulmonary:      Effort: Pulmonary effort is normal.      Breath sounds: No wheezing or rales.   Abdominal:      General: There is no distension.      Palpations: Abdomen is soft.      Tenderness: There is no abdominal tenderness.   Genitourinary:     Comments: There is a small wound at the meatus, no discharge noted.  Musculoskeletal:      Right lower leg: No edema.      Left lower leg: No edema.   Skin:     General: Skin is warm and dry.   Neurological:      Mental Status: He is alert and oriented to person, place, and time.   Psychiatric:         Mood and Affect: Mood normal.         Behavior: Behavior normal.         Fluids    Intake/Output Summary (Last 24 hours) at 1/17/2021 0857  Last data filed at 1/16/2021 2220  Gross per 24 hour   Intake 1340 ml   Output 2000  ml   Net -660 ml       Laboratory  Recent Labs     01/17/21  0444   WBC 10.1   RBC 2.72*   HEMOGLOBIN 8.5*   HEMATOCRIT 27.2*   .0*   MCH 31.3   MCHC 31.3*   RDW 58.2*   PLATELETCT 341   MPV 9.4     Recent Labs     01/15/21  0637 01/17/21  0444   SODIUM 134* 133*   POTASSIUM 3.6 3.8   CHLORIDE 95* 95*   CO2 26 27   GLUCOSE 225* 163*   BUN 13 19   CREATININE 4.30* 4.23*   CALCIUM 9.2 9.1                   Imaging    Assessment/Plan  * 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)- (present on admission)  Assessment & Plan  2nd to PVD & diabetes with non-healing wound and reported gangrene  S/P right BKA (12/31)    Abrasion of penis with infection- (present on admission)  Assessment & Plan  S/P leukocytosis: WBC's: 11.8 --> 9.8 (1/14) --> 10.1 (1/17)  Has been afebrile  Denies cough  Has a small, nonpainful penile wound infection near the opening of the meatus         -- [place] [facility] [place]         -- appears to be healing (compared to recent photo)  CXR (1/11): stable interstitial opacity or fibrotic change in the left lower lobe  Procalcitonin: 1.65 (1/8) --> 30.5 (1/11) --> 11.33 (1/15) -- ? etiology -- ? residual from recent PNA  [place] [facility] [place] Un[place]yn and then Zosyn (? duration)  Syphilis neg (11/16/20 ; not sure why this was done  HIV neg (11/16/20 ; not sure why this was done  U/A (-)  Penile wound cultures: shows yeast  On Diflucan (thru 1/20)  Monitor    Paroxysmal A-fib (HCC)- (present on admission)  Assessment & Plan  HR ok  On Lopressor: 37.5 mg bid  Monitor    End stage renal disease on dialysis (HCC)- (present on admission)  Assessment & Plan  On HD (T, Th, Sat)  PO4: 1.8 --> 2.5 --> 1.5 (1/15)  Off PhosLo (1/15)  S/P PO4 supplements x 4 doses     Type 2 diabetes mellitus with kidney complication, with long-term current use of insulin (HCC)- (present on admission)  Assessment & Plan  Hba1c: 7.8 (11/14/20) --> 5.2  (1/15)  BS: 143 (am 1/17)  Currently not on any diabetic meds  On Lantus: 5 units qam (1/16)  On SS coverage for better BS eval (1/15)  Note: home meds were Lantus 5 units qhs  Cont to monitor    Anemia- (present on admission)  Assessment & Plan  Has a recent hx -- likely 2nd to kidney disease  Recent surgery likely a component  Hb stable: 8.7 (1/14) --> 8.5 (1/17)  Fe: 65, sats 35%  B12: 852  Folate: 8.5  On Epogen    Essential hypertension- (present on admission)  Assessment & Plan  BP low normal and occ dips lower  On Lopressor: 37.5 mg bid --> will decrease to 25 mg bid (1/17)  Will check orthostatics  Cont to monitor    Hyperthyroidism- (present on admission)  Assessment & Plan  TSH: wnl (1/6)  On Tapazole    Hypokalemia- (present on admission)  Assessment & Plan  K+: 3.1 (1/14) --> 3.6 (1/15) --> 3.8 (1/17)  S/P KCL 40 meq x 1 dose (1/14)  Monitor    Dyslipidemia- (present on admission)  Assessment & Plan  On Lipitor

## 2021-01-18 PROBLEM — I95.1 ORTHOSTATIC HYPOTENSION: Status: ACTIVE | Noted: 2021-01-01

## 2021-01-18 NOTE — THERAPY
Speech Language Pathology  Daily Treatment     Patient Name: Luis Sepulveda  Age:  75 y.o., Sex:  male  Medical Record #: [id number]  Today's Date: 1/18/2021     Precautions  Precautions: Fall Risk, Non Weight Bearing Right Lower Extremity  Comments: Dialysis Tues, Thurs, Sat; L arm fistula; previously used port. R BKA, R IPOP    Subjective    Pt expressing frustration and pain in relation to catheter, limited insight and awareness re: high fluid retention and risk involved if not addressed.     Objective       01/18/21 0833   SLP Total Time Spent   SLP Individual Total Time Spent (Mins) 60   Treatment Charges   SLP Cognitive Skill Development First 15 Minutes 1   SLP Cognitive Skill Development Additional 15 Minutes 3       Assessment    Functional medication sort completed at this time. Pt correctly sorted 7/10 medications with 100% accuracy. Pt required MIN prompting to locate and correct the errors (one omission and two overdoses). Pt noted to require additional time for processing and completion of task. Pt hyperverbose and requiring redirection. Pt benefiting from SLP stepping away to eliminate distractions.     Strengths: Able to follow instructions, Independent prior level of function  Barriers: Impulsive, Impaired functional cognition, Impaired insight/denial of deficits    Plan    Address use of memory log, verbal recall and attention skills    Speech Therapy Problems     Problem: Memory STGs     Dates: Start: 01/14/21       Goal: STG-Within one week, patient will     Dates: Start: 01/14/21       Description: 1) Individualized goal:  complete verbal recall tasks with brief (5-10 minute) delay with 70% accuracy provided MIN cues.   2) Interventions:  SLP Self Care / ADL Training , SLP Cognitive Skill Development, and SLP Group Treatment                Problem: Problem Solving STGs     Dates: Start: 01/14/21       Goal: STG-Within one week, patient will     Dates: Start: 01/14/21       Description: 1)  Individualized goal:  complete complex attention tasks involving medication management with 80% accuracy provided SPV.   2) Interventions:  SLP Self Care / ADL Training , SLP Cognitive Skill Development, and SLP Group Treatment                Problem: Speech/Swallowing LTGs     Dates: Start: 01/14/21       Goal: LTG-By discharge, patient will     Dates: Start: 01/14/21       Description: 1) Individualized goal:  complete functional problem solving and recall information with 80% accuracy and MOD I.   2) Interventions:  SLP Self Care / ADL Training , SLP Cognitive Skill Development, and SLP Group Treatment

## 2021-01-18 NOTE — PROGRESS NOTES
[facility] Medicine Daily Progress Note    Date of Service  1/18/2021    Chief Complaint:  Hypertension  Afib  Diabetes  Leukocytosis  Penile wound    Interval History:  No significant events or changes since last visit    Review of Systems  Review of Systems   Constitutional: Negative for chills and fever.   Respiratory: Negative for shortness of breath.    Cardiovascular: Negative for chest pain.   Gastrointestinal: Negative for abdominal pain, diarrhea, nausea and vomiting.   Psychiatric/Behavioral: The patient is not nervous/anxious.         Physical Exam  Temp:  [36.3 °C (97.4 °F)-36.5 °C (97.7 °F)] 36.5 °C (97.7 °F)  Pulse:  [71-90] 78  Resp:  [16-18] 18  BP: (110-155)/(40-76) 124/58  SpO2:  [92 %-96 %] 92 %    Physical Exam  Vitals signs and nursing note reviewed.   Constitutional:       Appearance: Normal appearance.   HENT:      Head: Atraumatic.   Eyes:      Conjunctiva/sclera: Conjunctivae normal.      Pupils: Pupils are equal, round, and reactive to light.   Neck:      Musculoskeletal: Normal range of motion and neck supple.   Cardiovascular:      Rate and Rhythm: Normal rate and regular rhythm.      Heart sounds: No murmur.   Pulmonary:      Effort: Pulmonary effort is normal.      Breath sounds: No stridor. No wheezing or rales.   Abdominal:      General: There is no distension.      Palpations: Abdomen is soft.      Tenderness: There is no abdominal tenderness.   Musculoskeletal:      Right lower leg: No edema.      Left lower leg: No edema.   Skin:     General: Skin is warm and dry.      Findings: No rash.   Neurological:      Mental Status: He is alert and oriented to person, place, and time.   Psychiatric:         Mood and Affect: Mood normal.         Behavior: Behavior normal.         Fluids    Intake/Output Summary (Last 24 hours) at 1/18/2021 0923  Last data filed at 1/17/2021 2224  Gross per 24 hour   Intake 580 ml   Output 1100 ml   Net -520 ml       Laboratory  Recent Labs     01/17/21  5518    WBC 10.1   RBC 2.72*   HEMOGLOBIN 8.5*   HEMATOCRIT 27.2*   .0*   MCH 31.3   MCHC 31.3*   RDW 58.2*   PLATELETCT 341   MPV 9.4     Recent Labs     01/17/21  0444   SODIUM 133*   POTASSIUM 3.8   CHLORIDE 95*   CO2 27   GLUCOSE 163*   BUN 19   CREATININE 4.23*   CALCIUM 9.1                   Imaging    Assessment/Plan  * 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)- (present on admission)  Assessment & Plan  2nd to PVD & diabetes with non-healing wound and reported gangrene  S/P right BKA (12/31)    Abrasion of penis with infection- (present on admission)  Assessment & Plan  Resolved -- lesion healed  S/P leukocytosis: WBC's: 11.8 --> 9.8 (1/14) --> 10.1 (1/17)  Has been afebrile  Denies cough  Had a small, nonpainful penile wound infection near the opening of the meatus         -- reportedly had a little trauma at [facility] when placing palacios  CXR (1/11): stable interstitial opacity or fibrotic change in the left lower lobe  Procalcitonin: 1.65 (1/8) --> 30.5 (1/11) --> 11.33 (1/15) -- ? etiology -- ? residual from recent PNA  Was recently treated at [facility] for PNA with Unasyn and then Zosyn (? duration)  Syphilis neg (11/16/20 ; not sure why this was done  HIV neg (11/16/20 ; not sure why this was done  U/A (-)  Penile wound cultures: shows yeast  On Diflucan (thru 1/20)  Monitor    Paroxysmal A-fib ([facility])- (present on admission)  Assessment & Plan  HR ok  On Lopressor: 37.5 mg bid --> 25 mg bid 2nd to lower BP (1/17)  Monitor    End stage renal disease on dialysis ([facility])- (present on admission)  Assessment & Plan  On HD (T, Th, Sat)  PO4: 1.8 --> 2.5 --> 1.5 (1/15)  Off PhosLo (1/15)  S/P PO4 supplements x 4 doses     Type 2 diabetes mellitus with kidney complication, with long-term current use of insulin ([facility])- (present on admission)  Assessment & Plan  Hba1c: 7.8 (11/14/20) --> 5.2 (1/15)  BS: 125-208  Currently not on any diabetic meds  On Lantus: 5 units qam (1/16) --> will increase to 8 units  qam   On SS coverage for better BS eval (1/15)  Note: home meds were Lantus 5 units qhs  Cont to monitor    Anemia- (present on admission)  Assessment & Plan  Has a recent hx -- likely 2nd to kidney disease  Recent surgery likely a component  Hb stable: 8.7 (1/14) --> 8.5 (1/17)  Fe: 65, sats 35%  B12: 852  Folate: 8.5  On Epogen    Essential hypertension- (present on admission)  Assessment & Plan  BP better recently after med change  On Lopressor: 37.5 mg bid --> 25 mg bid (1/17)  Cont to monitor    Hyperthyroidism- (present on admission)  Assessment & Plan  TSH: wnl (1/6)  On Tapazole    Orthostatic hypotension  Assessment & Plan  Orthostatics: (+) from supine to sitting (1/17)  Decreased Lopressor dose (1/17)  Will recheck orthostatics for verification since Lopressor dose decreased (1/18)    Hypokalemia- (present on admission)  Assessment & Plan  K+: 3.1 (1/14) --> 3.6 (1/15) --> 3.8 (1/17)  S/P KCL 40 meq x 1 dose (1/14)  Monitor    Dyslipidemia- (present on admission)  Assessment & Plan  On Lipitor

## 2021-01-18 NOTE — CARE PLAN
Problem: Problem Solving STGs  Goal: STG-Within one week, patient will  Description: 1) Individualized goal:  complete complex attention tasks involving medication management with 80% accuracy provided SPV.   2) Interventions:  SLP Self Care / ADL Training , SLP Cognitive Skill Development, and SLP Group Treatment  Outcome: NOT MET     Problem: Memory STGs  Goal: STG-Within one week, patient will  Description: 1) Individualized goal:  complete verbal recall tasks with brief (5-10 minute) delay with 70% accuracy provided MIN cues.   2) Interventions:  SLP Self Care / ADL Training , SLP Cognitive Skill Development, and SLP Group Treatment  Outcome: NOT MET

## 2021-01-18 NOTE — ASSESSMENT & PLAN NOTE
Orthostatics: (+) from supine to sitting (1/17)  Decreased Lopressor dose (1/17)  Will recheck orthostatics for verification since Lopressor dose decreased (1/18)

## 2021-01-18 NOTE — THERAPY
Occupational Therapy  Daily Treatment     Patient Name: Luis Sepulveda  Age:  75 y.o., Sex:  male  Medical Record #: [id number]  Today's Date: 1/18/2021     Precautions  Precautions: (P) Fall Risk, Non Weight Bearing Right Lower Extremity  Comments: (P) Dialysis Tues, Thurs, Sat; L arm fistula; previously used port. R BKA, R IPOP    Safety   ADL Safety : (P) Requires Supervision for Safety, Requires Physical Assist for Safety, Requires Cueing for Safety  Bathroom Safety: (P) Requires Supervision for Safety, Requires Physical Assist for Safety, Requires Cuing for Safety  Comments: (P) See below notes for ADL performance details.    Subjective    Pt agreeable to take a shower.     Objective       01/18/21 0931   Precautions   Precautions Fall Risk;Non Weight Bearing Right Lower Extremity   Comments Dialysis Tues, Thurs, Sat; L arm fistula; previously used port. R BKA, R IPOP   Safety    ADL Safety  Requires Supervision for Safety;Requires Physical Assist for Safety;Requires Cueing for Safety   Bathroom Safety Requires Supervision for Safety;Requires Physical Assist for Safety;Requires Cuing for Safety   Comments See below notes for ADL performance details.   Cognition    Level of Consciousness Alert   Functional Level of Assist   Eating Independent   Grooming Independent;Seated   Grooming Description Seated in wheelchair at sink  (oral care, shaving, washing face)   Bathing Supervision   Bathing Description Adaptive equipment;Grab bar;Hand held shower;Tub bench;Set up for wound protection;Supervision for safety  (distant supv; assist to waterproof RLE)   Upper Body Dressing Independent   Lower Body Dressing Maximal Assist  (assist to don L sock and R IPOP)   Lower Body Dressing Description Assist with threading into pant leg;Increased time;Set-up of equipment;Supervision for safety;Verbal cueing  (assist to tread palacios and LLE into underwear,CGA for balance)   Toileting Stand by Assist   Toileting Description  Grab bar;Increased time;Set-up of equipment;Verbal cueing;Supervision for safety  (close SBA for balance safety)   Toilet Transfers Minimal Assist   Toilet Transfer Description Grab bar;Increased time;Set-up of equipment;Supervision for safety;Verbal cueing  (LOB for transfer to toilet, required min A to recover)   Tub / Shower Transfers Minimal Assist   Tub Shower Transfer Description Adaptive equipment;Grab bar;Shower bench;Increased time;Set-up of equipment;Supervision for safety;Verbal cueing  (w/c<>tub bench SPT with GB)   Interdisciplinary Plan of Care Collaboration   IDT Collaboration with  Nursing;Physician   Patient Position at End of Therapy Seated;Self Releasing Lap Belt Applied;Chair Alarm On;Call Light within Reach;Tray Table within Reach;Phone within Reach   Collaboration Comments CLOF, POC; notified RN/Dr. Goodman that pt' c/o pain with palacios, RLE pain   OT Total Time Spent   OT Individual Total Time Spent (Mins) 75   OT Charge Group   OT Self Care / ADL 5       Assessment    Pt tolerated OT session well with focus on progression with ADLs and bathroom transfers. Pt with near fall transferring onto toilet, required min A and GB to recover. He completed all other transfers without LOB and CGA-SBA. Pt reporting pain from palacios, as well as RLE residual limb pain. Pt reports that he can have GB installed in his bathroom if needed.     Strengths: Alert and oriented, Effective communication skills, Independent prior level of function, Making steady progress towards goals, Pleasant and cooperative, Supportive family, Willingly participates in therapeutic activities  Barriers: Decreased endurance, Fatigue, Impaired balance(safety awareness)    Plan    ADLs, IADLs and related functional mobility, Threshold shower transfers, BUE/Core strength, standing mike/balance.     Occupational Therapy Goals     Problem: Dressing     Dates: Start: 01/14/21       Goal: STG-Within one week, patient will dress LB     Dates:  Start: 01/14/21       Note:     Goal Note filed on 01/14/21 1519 by Zeina Taylor MS,OTR/L    1) Individualized Goal:  with Min A and AE as needed  2) Interventions:  OT Group Therapy, OT Self Care/ADL, OT Cognitive Skill Dev, OT Community Reintegration, OT Manual Ther Technique, OT Neuro Re-Ed/Balance, OT Therapeutic Activity, OT Evaluation, and OT Therapeutic Exercise                         Problem: Functional Transfers     Dates: Start: 01/14/21       Goal: STG-Within one week, patient will transfer to toilet     Dates: Start: 01/14/21       Note:     Goal Note filed on 01/14/21 1519 by Zeina Taylor MS,OTR/L    1) Individualized Goal:  with CGA and AE as needed  2) Interventions:  OT Group Therapy, OT Self Care/ADL, OT Cognitive Skill Dev, OT Community Reintegration, OT Manual Ther Technique, OT Neuro Re-Ed/Balance, OT Therapeutic Activity, OT Evaluation, and OT Therapeutic Exercise                   Goal: STG-Within one week, patient will transfer to tub/shower     Dates: Start: 01/14/21       Note:     Goal Note filed on 01/14/21 1519 by Zeina Taylor MS,OTR/L    1) Individualized Goal:  with CGA and AE as needed  2) Interventions:  OT Group Therapy, OT Self Care/ADL, OT Cognitive Skill Dev, OT Community Reintegration, OT Manual Ther Technique, OT Neuro Re-Ed/Balance, OT Therapeutic Activity, OT Evaluation, and OT Therapeutic Exercise                         Problem: OT Long Term Goals     Dates: Start: 01/14/21       Goal: LTG-By discharge, patient will complete basic self care tasks     Dates: Start: 01/14/21       Note:     Goal Note filed on 01/14/21 1519 by Zeina Taylor MS,OTR/L    1) Individualized Goal:  with mod I and AE as needed  2) Interventions:  OT Group Therapy, OT Self Care/ADL, OT Cognitive Skill Dev, OT Community Reintegration, OT Manual Ther Technique, OT Neuro Re-Ed/Balance, OT Therapeutic Activity, OT Evaluation, and OT Therapeutic Exercise                   Goal:  LTG-By discharge, patient will perform bathroom transfers     Dates: Start: 01/14/21       Note:     Goal Note filed on 01/14/21 1519 by Zeina Taylor MS,OTR/L    1) Individualized Goal:  with mod I and AE as needed  2) Interventions:  OT Group Therapy, OT Self Care/ADL, OT Cognitive Skill Dev, OT Community Reintegration, OT Manual Ther Technique, OT Neuro Re-Ed/Balance, OT Therapeutic Activity, OT Evaluation, and OT Therapeutic Exercise                         Problem: Toileting     Dates: Start: 01/14/21       Goal: STG-Within one week, patient will complete toileting tasks     Dates: Start: 01/14/21       Note:     Goal Note filed on 01/14/21 1519 by Zeina Taylor MS,OTR/L    1) Individualized Goal:  with Min A and AE as needed  2) Interventions:  OT Group Therapy, OT Self Care/ADL, OT Cognitive Skill Dev, OT Community Reintegration, OT Manual Ther Technique, OT Neuro Re-Ed/Balance, OT Therapeutic Activity, OT Evaluation, and OT Therapeutic Exercise

## 2021-01-18 NOTE — CARE PLAN
Problem: Mobility Transfers  Goal: STG-Within one week, patient will transfer bed to chair  Description: 1) Individualized goal:  At min A with LRD In order to maximize self mobility  2) Interventions:  PT Gait Training, PT Therapeutic Exercises, PT Neuro Re-Ed/Balance, PT Therapeutic Activity, PT Manual Therapy, and PT Evaluation  Outcome: MET     Problem: PT-Long Term Goals  Goal: LTG-By discharge, patient will propel wheelchair  Description: 1) Individualized goal:  150 ft At mod I indoor and outdoor in order to improve activity tolerance propelling himself around facility  2) Interventions:  PT Gait Training, PT Therapeutic Exercises, PT Neuro Re-Ed/Balance, PT Therapeutic Activity, PT Manual Therapy, and PT Evaluation  Outcome: PROGRESSING AS EXPECTED

## 2021-01-18 NOTE — CARE PLAN
Problem: Dressing  Goal: STG-Within one week, patient will dress LB  Outcome: NOT MET  Note: Requires max A.      Problem: Functional Transfers  Goal: STG-Within one week, patient will transfer to toilet  Outcome: NOT MET  Note: Min A to sba.   Goal: STG-Within one week, patient will transfer to tub/shower  Outcome: NOT MET  Note: To be addressed.      Problem: Toileting  Goal: STG-Within one week, patient will complete toileting tasks  Outcome: MET

## 2021-01-18 NOTE — PROGRESS NOTES
"Rehab Progress Note     Date of Service: 1/18/2021  Chief Complaint: Follow-up BKA    Interval Events (Subjective)    Patient seen and examined today in his room.  He is working with occupational therapy in the bathroom, shaving and cleaning his face.  He reports some pain associated with his indwelling Tomas catheter both in the penis as well as in the suprapubic area.  He also complained of some residual limb pain last night which resolved with some oxycodone and he was able to sleep. He has no other complaints.    Objective:  VITAL SIGNS: /58   Pulse 78   Temp 36.5 °C (97.7 °F) (Oral)   Resp 18   Ht 1.651 m (5' 5\")   Wt 76.5 kg (168 lb 10.4 oz)   SpO2 92%   BMI 28.07 kg/m²   Gen: alert, no apparent distress  CV: Fistula in left arm with palpable thrill  Msk: Right transtibial amputation    Recent Results (from the past 72 hour(s))   ACCU-CHEK GLUCOSE    Collection Time: 01/15/21  4:50 PM   Result Value Ref Range    Glucose - Accu-Ck 208 (H) 65 - 99 mg/dL   ACCU-CHEK GLUCOSE    Collection Time: 01/15/21  9:16 PM   Result Value Ref Range    Glucose - Accu-Ck 157 (H) 65 - 99 mg/dL   ACCU-CHEK GLUCOSE    Collection Time: 01/16/21  7:37 AM   Result Value Ref Range    Glucose - Accu-Ck 167 (H) 65 - 99 mg/dL   ACCU-CHEK GLUCOSE    Collection Time: 01/16/21 10:52 AM   Result Value Ref Range    Glucose - Accu-Ck 187 (H) 65 - 99 mg/dL   ACCU-CHEK GLUCOSE    Collection Time: 01/16/21  5:54 PM   Result Value Ref Range    Glucose - Accu-Ck 125 (H) 65 - 99 mg/dL   ACCU-CHEK GLUCOSE    Collection Time: 01/16/21  8:04 PM   Result Value Ref Range    Glucose - Accu-Ck 193 (H) 65 - 99 mg/dL   Comp Metabolic Panel    Collection Time: 01/17/21  4:44 AM   Result Value Ref Range    Sodium 133 (L) 135 - 145 mmol/L    Potassium 3.8 3.6 - 5.5 mmol/L    Chloride 95 (L) 96 - 112 mmol/L    Co2 27 20 - 33 mmol/L    Anion Gap 11.0 7.0 - 16.0    Glucose 163 (H) 65 - 99 mg/dL    Bun 19 8 - 22 mg/dL    Creatinine 4.23 (H) 0.50 - 1.40 " mg/dL    Calcium 9.1 8.5 - 10.5 mg/dL    AST(SGOT) 19 12 - 45 U/L    ALT(SGPT) 10 2 - 50 U/L    Alkaline Phosphatase 106 (H) 30 - 99 U/L    Total Bilirubin 0.3 0.1 - 1.5 mg/dL    Albumin 3.3 3.2 - 4.9 g/dL    Total Protein 6.3 6.0 - 8.2 g/dL    Globulin 3.0 1.9 - 3.5 g/dL    A-G Ratio 1.1 g/dL   CBC WITH DIFFERENTIAL    Collection Time: 01/17/21  4:44 AM   Result Value Ref Range    WBC 10.1 4.8 - 10.8 K/uL    RBC 2.72 (L) 4.70 - 6.10 M/uL    Hemoglobin 8.5 (L) 14.0 - 18.0 g/dL    Hematocrit 27.2 (L) 42.0 - 52.0 %    .0 (H) 81.4 - 97.8 fL    MCH 31.3 27.0 - 33.0 pg    MCHC 31.3 (L) 33.7 - 35.3 g/dL    RDW 58.2 (H) 35.9 - 50.0 fL    Platelet Count 341 164 - 446 K/uL    MPV 9.4 9.0 - 12.9 fL    Neutrophils-Polys 67.10 44.00 - 72.00 %    Lymphocytes 14.70 (L) 22.00 - 41.00 %    Monocytes 13.10 0.00 - 13.40 %    Eosinophils 0.10 0.00 - 6.90 %    Basophils 0.60 0.00 - 1.80 %    Immature Granulocytes 4.40 (H) 0.00 - 0.90 %    Nucleated RBC 0.30 /100 WBC    Neutrophils (Absolute) 6.75 1.82 - 7.42 K/uL    Lymphs (Absolute) 1.48 1.00 - 4.80 K/uL    Monos (Absolute) 1.32 (H) 0.00 - 0.85 K/uL    Eos (Absolute) 0.01 0.00 - 0.51 K/uL    Baso (Absolute) 0.06 0.00 - 0.12 K/uL    Immature Granulocytes (abs) 0.44 (H) 0.00 - 0.11 K/uL    NRBC (Absolute) 0.03 K/uL   ESTIMATED GFR    Collection Time: 01/17/21  4:44 AM   Result Value Ref Range    GFR If  17 (A) >60 mL/min/1.73 m 2    GFR If Non  14 (A) >60 mL/min/1.73 m 2   ACCU-CHEK GLUCOSE    Collection Time: 01/17/21  7:48 AM   Result Value Ref Range    Glucose - Accu-Ck 143 (H) 65 - 99 mg/dL   SARS-CoV-2 PCR (24 hour In-House): Collect NP swab in [facility]    Collection Time: 01/17/21  9:00 AM    Specimen: Nasopharyngeal; Respirate   Result Value Ref Range    SARS-CoV-2 Source NP Swab     SARS-CoV-2 by PCR NotDetected    ACCU-CHEK GLUCOSE    Collection Time: 01/17/21 11:06 AM   Result Value Ref Range    Glucose - Accu-Ck 203 (H) 65 - 99 mg/dL    ACCU-CHEK GLUCOSE    Collection Time: 01/17/21  5:13 PM   Result Value Ref Range    Glucose - Accu-Ck 172 (H) 65 - 99 mg/dL   ACCU-CHEK GLUCOSE    Collection Time: 01/17/21  8:33 PM   Result Value Ref Range    Glucose - Accu-Ck 208 (H) 65 - 99 mg/dL   ACCU-CHEK GLUCOSE    Collection Time: 01/18/21  7:15 AM   Result Value Ref Range    Glucose - Accu-Ck 157 (H) 65 - 99 mg/dL   ACCU-CHEK GLUCOSE    Collection Time: 01/18/21 11:04 AM   Result Value Ref Range    Glucose - Accu-Ck 179 (H) 65 - 99 mg/dL       Current Facility-Administered Medications   Medication Frequency   • [START ON 1/19/2021] insulin glargine (Lantus) injection QAM INSULIN   • metoprolol (LOPRESSOR) tablet 25 mg TWICE DAILY   • tamsulosin (FLOMAX) capsule 0.8 mg QHS   • fluconazole (DIFLUCAN) tablet 200 mg DAILY   • insulin regular (HumuLIN R,NovoLIN R) injection 4X/DAY ACHS    And   • dextrose 50% (D50W) injection 50 mL Q15 MIN PRN   • gabapentin (NEURONTIN) capsule 300 mg DAILY   • heparin injection 1,800 Units DIALYSIS PRN   • hydrOXYzine HCl (ATARAX) tablet 50 mg Q6HRS PRN   • melatonin tablet 3 mg HS PRN   • Respiratory Therapy Consult Continuous RT   • Pharmacy Consult Request ...Pain Management Review 1 Each PHARMACY TO DOSE   • acetaminophen (Tylenol) tablet 650 mg Q4HRS PRN   • lactulose 20 GM/30ML solution 30 mL QDAY PRN   • docusate sodium (ENEMEEZ) enema 283 mg QDAY PRN   • fleet enema 133 mL QDAY PRN   • artificial tears ophthalmic solution 1 Drop PRN   • benzocaine-menthol (CEPACOL) lozenge 1 Lozenge Q2HRS PRN   • mag hydrox-al hydrox-simeth (MAALOX PLUS ES or MYLANTA DS) suspension 20 mL Q2HRS PRN   • ondansetron (ZOFRAN ODT) dispertab 4 mg 4X/DAY PRN    Or   • ondansetron (ZOFRAN) syringe/vial injection 4 mg 4X/DAY PRN   • traZODone (DESYREL) tablet 50 mg QHS PRN   • sodium chloride (OCEAN) 0.65 % nasal spray 2 Spray PRN   • traMADol (ULTRAM) 50 MG tablet 50 mg Q4HRS PRN   • atorvastatin (LIPITOR) tablet 40 mg Q EVENING   •  calcitRIOL (ROCALTROL) capsule 0.25 mcg DAILY   • epoetin (Retacrit) injection (Dialysis use only) 10,000 Units TUE+THU+SAT   • methimazole (TAPAZOLE) tablet 5 mg Q EVENING   • methimazole (TAPAZOLE) tablet 7.5 mg QAM   • omeprazole (PRILOSEC) capsule 20 mg DAILY   • oxyCODONE immediate-release (ROXICODONE) tablet 5 mg Q4HRS PRN   • senna-docusate (PERICOLACE or SENOKOT S) 8.6-50 MG per tablet 2 Tab BID    And   • magnesium hydroxide (MILK OF MAGNESIA) suspension 30 mL QDAY PRN    And   • bisacodyl (DULCOLAX) suppository 10 mg QDAY PRN   • apixaban (ELIQUIS) tablet 2.5 mg BID       Orders Placed This Encounter   Procedures   • Diet Order Diet: Renal; Second Modifier: (optional): Consistent CHO (Diabetic)     Standing Status:   Standing     Number of Occurrences:   1     Order Specific Question:   Diet:     Answer:   Renal [8]     Order Specific Question:   Second Modifier: (optional)     Answer:   Consistent CHO (Diabetic) [4]       Assessment:  Active Hospital Problems    Diagnosis   • *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)   • Leukocytosis   • Urinary retention   • Paroxysmal A-fib (HCC)   • End stage renal disease on dialysis (HCC)   • Type 2 diabetes mellitus with kidney complication, with long-term current use of insulin (HCC)   • Essential hypertension   • Anemia   • Hyperthyroidism   • Dyslipidemia   • Hypokalemia   • Impaired mobility and ADLs     This patient is a 75 y.o. male admitted for acute inpatient rehabilitation with 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).    I led and attended the weekly conference today, and agree with the IDT conference documentation and plan of care as noted below.    Date of conference: 1/18/2021    Goals and barriers: See IDT note.    Biggest barriers: urinary retention, amputation, high fall risk during transfers, generalized weakness, indwelling cath, rigidity in thinking, mild cognitive impairments with memory and  attention, hyper-verbose    Goals in next week: discharge    CM/social support: ex-wife and son work during the day    Anticipated DC date: 1/29    Home health: PT/OT/SLP/RN    Equip: ramp, WC, FWW    Follow up: PCP, surgery, Dr. Elliott, urology, nephrology    Medical Decision Making and Plan:    Right BKA  12/31 Dr. Lynn  Continue full rehab program  PT/OT/SLP, 1 hr each discipline, 5 days per week     Outpatient follow-up for suture removal after discharge  Outpatient follow-up with Dr. Elliott, referral made    Phantom pain, improved  Continue gabapentin 300 mg, maximum due to renal failure  Outpatient follow-up with Dr. Elliott, referral made     Encephalopathy, resolved  Speech therapy for cognitive assessment  Currently only with mild memory deficits    ?Depression  Psychology consult, Dr Izaguirre     Urinary retention, continues  Has very high residuals  Continue intermittent caths for over 400 cc, patient refusing  Started Flomax 0.4 mg 1/14 --> 0.8 mg 1/16  PRN lidocaine jelly for caths  Placed indwelling catheter for bladder rest 1/17  Referral to urology made     Bowel program  History of diabetic gastroparesis  Continue bowel medications  Scheduled Sennakot  PRN Miralax, MOM, bisacodyl suppository  Last BM 1/17    DVT prophylaxis  Eliquis     Appreciate the assistance of the hospitalist with his medical comorbidities:     End-stage renal disease on hemodialysis  Consulted nephrology  Continue calcitriol 0.25 mcg daily  Continue Retacrit with dialysis  Continue K-Phos 250 mg 2 times daily     Diabetes with hyperglycemia  Sliding scale insulin  Lantus 5 units daily --> 8 units 1/19     Peripheral artery disease  Continue Eliquis, renal dosing  Continue statin     Coronary artery disease  With history of stents  Continue statin     Paroxysmal atrial fibrillation  Continue Eliquis, renal dosing  Continue metoprolol 37.5 mg twice daily --> dose decreased to 25 mg twice daily     Hypertension  Hypotension  Continue  metoprolol 37.5 mg twice daily --> dose decreased to 25 mg twice daily     Hyperlipidemia  Continue statin     Hyperthyroidism  Continue methimazole 7.5 mg in the morning, 5 mg in the evening     GERD  Continue omeprazole 20 mg     Anemia  Likely of chronic renal disease  Continue Retacrit     Leukocytosis, resolved  Recently treated with IV antibiotics for pneumonia    Elevated procalcitonin, improved  Recently treated with IV antibiotics for pneumonia    Penile discharge  Culture with yeast, continue Diflucan    COVID negative 1/11, re-screen 1/17 negative    Total time:  40 minutes.  I spent greater than 50% of the time for patient care, counseling, and coordination on this date, including patient face-to face time, unit/floor time with review of records/pertinent lab data and studies, as well as discussing diagnostic evaluation/work up, planned therapeutic interventions, and future disposition of care, as per the interval events/subjective and the assessment and plan as noted above.      Lata Goodman M.D.   Physical Medicine and Rehabilitation

## 2021-01-18 NOTE — PROGRESS NOTES
"Nephrology Daily Progress Note    Author: Hui ECKERT  Collaborating Physician: Mario Hunter MD  Date of Service  1/18/2021    Chief Complaint  A 75-year-old male with end-stage renal disease   on chronic hemodialysis Tuesdays, Thursdays and Saturdays.  The patient was   admitted on 12/31 for right below-the-knee amputation.  He had wound and   gangrene of the right foot.  He has had some peripheral vascular procedures   previously.  He did have a right ray amputation in 11/2020.  He has had a   nonhealing wound and he was admitted for right below-the-knee amputation that   was performed on 12/31.  We have been consulted to assist in his dialysis   needs.  He had dialysis on 12/31 prior to admission.  Today, he is feeling   dyspneic.  He has no cough or sputum production.    Daily Nephrology Summary   1/13- Transferred to Rehab  1/14 - Pt sitting up in W/C in room, denies any complaints/concerns, due for HD today, BP labile, K+ 3.1, per chart review-penile discharge cx sent  1/15 - Pt getting up to restroom to shower with assistance, HD yesterday with 2.1L UF, K+ corrected to 3.6, iron stores replete, no growth on penile wound cx, VSS on RA   1/16 - No overnight events, HD later today.  No labs for review.  Sitting up at bedside eating lunch.  No complaints.   1/18 - No new events. Pt with palacios. C/O GI issues, bowels are \"loose\". No new events.     Review of Systems  Review of Systems   Constitutional: Positive for malaise/fatigue. Negative for chills and fever.   HENT: Negative.    Eyes: Negative.    Respiratory: Negative.    Cardiovascular: Negative.    Gastrointestinal: Positive for abdominal pain. Negative for nausea and vomiting.        Nonspecific, stomach \"is off\" since restarting solid food.   Genitourinary:        Palacios catheter   Musculoskeletal: Positive for joint pain. Negative for back pain and myalgias.        C/O RLE phantom pain   Skin: Negative.    Neurological: Negative.  "   Endo/Heme/Allergies: Negative.    Psychiatric/Behavioral: Negative.       Physical Exam  Temp:  [36.3 °C (97.4 °F)-36.5 °C (97.7 °F)] 36.5 °C (97.7 °F)  Pulse:  [71-90] 78  Resp:  [16-18] 18  BP: (110-155)/(40-76) 124/58  SpO2:  [92 %-96 %] 92 %    Physical Exam  Constitutional:       Appearance: Normal appearance.   HENT:      Head: Normocephalic and atraumatic.      Nose: Nose normal.      Mouth/Throat:      Mouth: Mucous membranes are moist.      Pharynx: Oropharynx is clear.   Eyes:      Extraocular Movements: Extraocular movements intact.      Pupils: Pupils are equal, round, and reactive to light.   Neck:      Musculoskeletal: Normal range of motion and neck supple.   Cardiovascular:      Rate and Rhythm: Normal rate and regular rhythm.      Pulses: Normal pulses.      Comments: L AVF +T/B  Pulmonary:      Effort: Pulmonary effort is normal.      Breath sounds: Normal breath sounds.   Abdominal:      General: Bowel sounds are normal.      Palpations: Abdomen is soft.   Musculoskeletal:         General: No deformity.      Comments: R BKA    Skin:     General: Skin is warm and dry.   Neurological:      General: No focal deficit present.      Mental Status: He is alert and oriented to person, place, and time.   Psychiatric:         Mood and Affect: Mood normal.         Behavior: Behavior normal.         Fluids    Intake/Output Summary (Last 24 hours) at 1/18/2021 0755  Last data filed at 1/17/2021 2224  Gross per 24 hour   Intake 1000 ml   Output 1100 ml   Net -100 ml       Laboratory  Recent Labs     01/17/21  0444   WBC 10.1   RBC 2.72*   HEMOGLOBIN 8.5*   HEMATOCRIT 27.2*   .0*   MCH 31.3   MCHC 31.3*   RDW 58.2*   PLATELETCT 341   MPV 9.4     Recent Labs     01/17/21  0444   SODIUM 133*   POTASSIUM 3.8   CHLORIDE 95*   CO2 27   GLUCOSE 163*   BUN 19   CREATININE 4.23*   CALCIUM 9.1         No results for input(s): NTPROBNP in the last 72 hours.        Imaging  Available labs, imaging and clinical  documentation reviewed.     Assessment/Plan  #  End-stage renal disease, on chronic hemodialysis qTTS  - Normally dialyzes at [facility] via L AVF   #  Status post right BKA on 12/31/2020 for diabetic ulcer.  #  Hypertension, fair control.  - On metoprolol   #  Anemia: superimposed blood loss in addition to chronic kidney disease.  - Hgb below target   - On ARMANDO   - Iron replete, ferritin elevated   #  Diabetes mellitus.  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  - On calcitriol and calcium acetate   - Calcium acetate currently being held   - Vit D 48   - Phos 1.5  - iPTH 109   #  Peripheral vascular disease.   #  Paroxysmal atrial fibrillation, on Eliquis and metoprolol.   #  Coronary artery disease, status post stents.  Asymptomatic.  #  History of hyperthyroidism. On methimazole.   #  Dyslipidemia, on statin therapy.   #  Past history of heavy tobacco use.  #  Pneumonia: s/p IV Zosyn   #  Encephalopathy: resolving  #  Weakness/debility  #  Urinary retention requiring catheter placement  - Chronic oliguria  #  Hypokalemia, corrected  #  Hyponatremia in ESRD, mild, improved  #  Penile meatus wound  - Tomas  - NGTD on cx      PLAN:  -No HD today (Mon)  -Continue hemodialysis qTTS/PRN  -UF as tolerated  -Calcium acetate on hold for now given hypophos  -Check PO4 in am     -ARMANDO TIW with HD, goal Hgb 10-11   -Transfuse PRN Hgb <7  -Continue home medications   -Goal BP <140/90  -No dietary protein restrictions  -Dose meds for ESRD  -Limit narcotics/sedating meds  -PT/OT/Speech  -Follow labs    Thank you,

## 2021-01-18 NOTE — CARE PLAN
Problem: Safety  Goal: Will remain free from injury  Outcome: PROGRESSING AS EXPECTED   Pt uses call light consistently and appropriately. Waits for assistance does not attempt self transfer this shift. Able to verbalize needs.   Problem: Urinary Elimination:  Goal: Ability to reestablish a normal urinary elimination pattern will improve  Outcome: PROGRESSING AS EXPECTED   Patient with indwelling catheter in place draining adequate amounts of clear yellow urine to gravity; skin intact; no leakage, afebrile.  Will continue to monitor.   Problem: Pain Management  Goal: Pain level will decrease to patient's comfort goal  Outcome: PROGRESSING AS EXPECTED   Patient able to verbalize pain level and verbalize an acceptable level of pain.

## 2021-01-18 NOTE — CARE PLAN
Problem: Communication  Goal: The ability to communicate needs accurately and effectively will improve  Note: Pt is alert and oriented. Able to verbalize needs without difficulty.      Problem: Safety  Goal: Will remain free from injury  Note: Pt remains free of accidental injury at this time. Able to verbalize needs and does not attempt self transfer. Bed rails x2 secured for safety. Call light and personal belongings within reach.

## 2021-01-19 PROBLEM — D72.829 LEUKOCYTOSIS: Status: ACTIVE | Noted: 2021-01-01

## 2021-01-19 PROBLEM — R10.9 ABDOMINAL PAIN: Status: ACTIVE | Noted: 2021-01-01

## 2021-01-19 NOTE — THERAPY
Physical Therapy   Daily Treatment     Patient Name: Luis Sepulveda  Age:  75 y.o., Sex:  male  Medical Record #: [id number]  Today's Date: 1/19/2021     Precautions  Precautions: Fall Risk, Non Weight Bearing Right Lower Extremity  Comments: R BKA, R IPOP, dialysis Tues, Thurs, Sat, L arm fistula    Subjective    Pt reports he needs to use toilet      Objective       01/19/21 0831   Transfer Functional Level of Assist   Bed, Chair, Wheelchair Transfer Minimal Assist   Bed Chair Wheelchair Transfer Description Adaptive equipment;Increased time;Verbal cueing   Toilet Transfers Minimal Assist   Toilet Transfer Description Grab bar;Verbal cueing;Requires lift   Standing Lower Body Exercises   Hip Flexion 2 sets of 15;Right    Hip Extension 2 sets of 15;Right    Hip Abduction 2 sets of 15;Right    Other Exercises VCs and demo for form and tehcnique   Bed Mobility    Supine to Sit Contact Guard Assist   Sit to Stand Minimal Assist   Scooting Contact Guard Assist   Rolling Supervised   Interdisciplinary Plan of Care Collaboration   Patient Position at End of Therapy Seated;Call Light within Reach;Tray Table within Reach  (handoff rec therapy)   PT Total Time Spent   PT Individual Total Time Spent (Mins) 60   PT Charge Group   PT Therapeutic Exercise 1   PT Therapeutic Activities 3       Assessment    Pt limited this session by loose BM and pain from catheter with movement. Pt did demo LOB trying to hop fwd in // bars for better exercise position, L knee buckled in stance. Able to recover with mod A. Pt performs exercises with cues and demo.    Strengths: Independent prior level of function, Making steady progress towards goals, Willingly participates in therapeutic activities  Barriers: Fatigue, Generalized weakness, Impaired activity tolerance, Impaired balance(Impaired endurance, fall risk)    Plan    Exercise BLE - need to build safer strength on LLE, progress BKA edu, go over wrapping strategy, STS FWW, transfer  safety, ambulation with close wc follow, go over needs for DC - wc/ramp? SPV, home accessibility?    Physical Therapy Problems     Problem: Mobility     Dates: Start: 01/14/21       Goal: STG-Within one week, patient will propel wheelchair community     Dates: Start: 01/14/21       Description: 1) Individualized goal:  150 ft At mod I in order to improve activity tolerance propelling himself around facility  2) Interventions:  PT Gait Training, PT Therapeutic Exercises, PT Neuro Re-Ed/Balance, PT Therapeutic Activity, PT Manual Therapy, and PT Evaluation                  Problem: PT-Long Term Goals     Dates: Start: 01/14/21       Goal: LTG-By discharge, patient will propel wheelchair     Dates: Start: 01/14/21       Description: 1) Individualized goal:  150 ft At mod I indoor and outdoor in order to improve activity tolerance propelling himself around facility  2) Interventions:  PT Gait Training, PT Therapeutic Exercises, PT Neuro Re-Ed/Balance, PT Therapeutic Activity, PT Manual Therapy, and PT Evaluation          Goal: LTG-By discharge, patient will transfer one surface to another     Dates: Start: 01/14/21       Description: 1) Individualized goal:  At mod I with LRD In order to maximize self mobility  2) Interventions:  PT Gait Training, PT Therapeutic Exercises, PT Neuro Re-Ed/Balance, PT Therapeutic Activity, PT Manual Therapy, and PT Evaluation          Goal: LTG-By discharge, patient will transfer in/out of a car     Dates: Start: 01/14/21       Description: 1) Individualized goal:  At SPV with LRD In order to maximize self mobility  2) Interventions:  PT Gait Training, PT Therapeutic Exercises, PT Neuro Re-Ed/Balance, PT Therapeutic Activity, PT Manual Therapy, and PT Evaluation

## 2021-01-19 NOTE — ASSESSMENT & PLAN NOTE
UA 5-10 WBC w/ negative bacteria, UCx negative  CXR improved w/ no new consolidation  BCx x 2 NGTD  PCT elevated but downtrending compared w/ recent values  WBC improving w/o intervention

## 2021-01-19 NOTE — THERAPY
"Recreational Therapy   Initial Evaluation     Patient Name: Luis Sepulveda  Age:  75 y.o., Sex:  male  Medical Record #: [id number]  Today's Date: 1/19/2021     Subjective    \"I like fly fishing. I used to enjoy golfing. I want to be able to spend time with my grandchildren.\" Pt stated that with their new disability they may not be able to golf anymore.     Objective       01/19/21 0931   Leisure History   Leisure Interests Hobbies   Leisure Comments fly fishing, enjoys golf, spending time with grandkids   Pre-Morbid Leisure Lifestyle Active;Individual   Prior Living Arrangements   Lives with - Patient's Self Care Capacity Other (Comments)  (lives with ex-wife, son has moved in temporarily to assist)   Steps Into Home 0   Steps In Home 0   Ambulation Independent   Assistive Devices Used None   Driving / Transportation Relatives / Others Provide Transportation   Functional Ability Status - Physical   Endurance Low   Right  Strong   Left  Strong   Right Arm Strong   Left Arm Strong   Right Leg   (R BKA)   Left Leg Weak   Upper Extremity Gross Motor Uses Both Arms / Hands  (peripherial neuropathy)   Lower Extremity Gross Motor Uses Left Leg  (R BKA )   Fine Motor Manipulates Small Objects   Functional Ability Status - Cognitive   Attention Span Remains on Task with Cueing  (goes off on tangent )   Comprehension Requires Cueing;Follows Two Step Commands   Judgment Able to Make Independent Decisions   Cognitive Comments   (Min verbal cues to remain on task )   Functional Ability Status - Emotional    Affect Appropriate;Bright   Mood Neutral;Appropriate   Behavior Appropriate;Cooperative   Leisure Competence Measure   Leisure Awareness Cueing Assist   Leisure Attitude Cueing Assist   Leisure Skills Moderate Assist   Cultural / Social Behaviors Independent   Interpersonal Skills Independent   Community Integration Skills Moderate Assist   Social Contact Independent   Community Participation Independent "   Clinical Impression   Clinical Impression Impaired Leisure Skills;Impaired Relaxation and Coping Skills;Impaired Community Skills;Impaired Endurance;Impaired Fine Motor Leisure Functioning;Impaired Gross Motor Leisure Functioning   Current Discharge Plan   Current Discharge Plan Return to Prior Living Situation   Benefit    Benefit Patient would Benefit from Inpatient Recreational Therapy to Maximize Independent Leisure Functioning    Interdisciplinary Plan of Care Collaboration   IDT Collaboration with  Recreation Therapist   Patient Position at End of Therapy Seated;Call Light within Reach;Tray Table within Reach   Strengths & Barriers   Strengths Able to follow instructions;Alert and oriented;Motivated for self care and independence;Pleasant and cooperative;Willingly participates in therapeutic activities;Effective communication skills   Barriers Decreased endurance;Fatigue;Generalized weakness;Impaired activity tolerance;Impaired balance;Limited mobility;Pain   Treatment Time   Total Time Spent (mins) 30   Procedural Tracking   Procedural Tracking Community Re-Integration;Leisure Skills Awareness       Assessment  Patient is 75 y.o. male with a diagnosis of Status post below-knee amputation of right lower extremity.  Additional factors influencing patient status / progress (ie: cognitive factors, co-morbidities, social support, etc): a past medical history of diabetes, end-stage renal disease on hemodialysis, peripheral artery disease, coronary artery disease, paroxysmal atrial fibrillation, hyperthyroidism, hyperlipidemia. Pt c/o with catheter upon movement donning pants.      Plan  Recommend Recreational Therapy 30 minutes per day 2-3 days per week for 2 weeks for the following treatments:  Community Re-Integration, Community Skills Development, Leisure Skills Awareness, Leisure Skills Development, Gross Motor Functional Leisure Skills and Fine Motor Functional Leisure Skills    Goals:  Long term and short  term goals have been discussed with patient and they are in agreement.    Recreation Therapy Problems     Problem: Recreation Therapy     Dates: Start: 01/19/21       Goal: STG-Within one week, patient will demonstrate leisure problem solving     Dates: Start: 01/19/21       Description: by sharing on two positive lesiure activities they would like to participate in either in session or in their free time and any perceived barriers to their participation.              Goal: STG-Within one week, patient will demonstrate a standing tolerance     Dates: Start: 01/19/21       Description: By standing and dual tasking with a cognitive leisure activity for 3 minutes X3 Min A without LOB.           Goal: LTG-By discharge, patient will demonstrate leisure problem solving     Dates: Start: 01/19/21       Description: by sharing on two positive lesiure activities they would like to participate in following discharge and any perceived barriers to their participation.              Goal: LTG-By discharge, patient will demonstrate a standing tolerance     Dates: Start: 01/19/21       Description: By standing and dual tasking with a cognitive leisure activity for 5 minutes X3 CGA without LOB.

## 2021-01-19 NOTE — CARE PLAN
Problem: Urinary Elimination:  Goal: Ability to reestablish a normal urinary elimination pattern will improve  Note: Pt with palacios catheter to drainage bag with moderate amount of clear yellow urine. Palacios care rendered.     Problem: Pain Management  Goal: Pain level will decrease to patient's comfort goal  Note: Medicated pt with Oxycodone for right BKA incision pain with good relief. Pillows provided for comfort. Maintained room conducive to sleep and rest. Will continue to assess and monitor for pain.

## 2021-01-19 NOTE — THERAPY
01/18/21 1359   Precautions   Precautions Fall Risk;Non Weight Bearing Right Lower Extremity   Comments R IPOP, dialysis Tues, Thurs, Sat, L arm fistula   Pain 0 - 10 Group   Location Other (Comments)  (Catheter)   Therapist Pain Assessment During Activity   Cognition    Level of Consciousness Alert   Safety Awareness Impaired   Wheelchair Functional Level of Assist   Wheelchair Assist Supervised   Stairs Functional Level of Assist   Level of Assist with Stairs Unable to Participate   Transfer Functional Level of Assist   Bed, Chair, Wheelchair Transfer Minimal Assist   Bed Chair Wheelchair Transfer Description Adaptive equipment;Increased time;Set-up of equipment;Supervision for safety;Verbal cueing  (FWW)   Supine Lower Body Exercise   Bridges 2 sets of 15;Two Legged   Straight Leg Raises 2 sets of 15;Bilateral   Short Arc Quad 2 sets of 15;Left   Other Exercises 2 sets of 15 abdominal crunches, isometric hip extension supine   Bed Mobility    Supine to Sit Stand by Assist   Sit to Supine Stand by Assist   Sit to Stand Contact Guard Assist  (FWW)   Scooting Contact Guard Assist   Rolling Supervised   PT Total Time Spent   PT Individual Total Time Spent (Mins) 60   PT Charge Group   PT Therapeutic Exercise 2   PT Therapeutic Activities 2   Physical Therapy   Daily Treatment     Patient Name: Luis Sepulveda  Age:  75 y.o., Sex:  male  Medical Record #: [id number]  Today's Date: 1/18/2021     Precautions  Precautions: Fall Risk, Non Weight Bearing Right Lower Extremity  Comments: R IPOP, dialysis Tues, Thurs, Sat, L arm fistula    Subjective    The patient agreed to PT.  He complained that the Tomas catheter was painful with movement.     Objective    The patient was in a hospital gown and needed assistance to get changed into his clothing to go to the gym.  PT assisted the patient for upper body and lower body dressing before leaving his room to the gym.  He transferred from the wheelchair onto the bed for  safety when changing into clothing.  In the gym the patient practiced transferring with a walker wheelchair to/from mat with correct hand placement and body mechanics to stand up and transfer.  On the mat he participated in a supine therapeutic exercise program which is indicated above.    Assessment    The patient was limited by pain from the catheter during transitional activities such as sit to/from supine.  He participated in the exercise program on the mat which will be most beneficial to improve his strength and tolerance for activity.  He needs to practice sequencing sit to/from stand with a fww for safety.  Strengths: Independent prior level of function, Making steady progress towards goals, Willingly participates in therapeutic activities  Barriers: Fatigue, Generalized weakness, Impaired activity tolerance, Impaired balance(Impaired endurance, fall risk)    Plan    Safety education, bed mobility and transferring with a FWW, therapeutic exercise program, gait training as tolerated    Physical Therapy Problems     Problem: Mobility     Dates: Start: 01/14/21       Goal: STG-Within one week, patient will propel wheelchair community     Dates: Start: 01/14/21       Description: 1) Individualized goal:  150 ft At mod I in order to improve activity tolerance propelling himself around facility  2) Interventions:  PT Gait Training, PT Therapeutic Exercises, PT Neuro Re-Ed/Balance, PT Therapeutic Activity, PT Manual Therapy, and PT Evaluation                  Problem: PT-Long Term Goals     Dates: Start: 01/14/21       Goal: LTG-By discharge, patient will propel wheelchair     Dates: Start: 01/14/21       Description: 1) Individualized goal:  150 ft At mod I indoor and outdoor in order to improve activity tolerance propelling himself around facility  2) Interventions:  PT Gait Training, PT Therapeutic Exercises, PT Neuro Re-Ed/Balance, PT Therapeutic Activity, PT Manual Therapy, and PT Evaluation          Goal:  LTG-By discharge, patient will transfer one surface to another     Dates: Start: 01/14/21       Description: 1) Individualized goal:  At mod I with LRD In order to maximize self mobility  2) Interventions:  PT Gait Training, PT Therapeutic Exercises, PT Neuro Re-Ed/Balance, PT Therapeutic Activity, PT Manual Therapy, and PT Evaluation          Goal: LTG-By discharge, patient will transfer in/out of a car     Dates: Start: 01/14/21       Description: 1) Individualized goal:  At SPV with LRD In order to maximize self mobility  2) Interventions:  PT Gait Training, PT Therapeutic Exercises, PT Neuro Re-Ed/Balance, PT Therapeutic Activity, PT Manual Therapy, and PT Evaluation

## 2021-01-19 NOTE — DISCHARGE PLANNING
CM met with patient to discuss IDT and DC date of 1/29/21.  Answered questions.  Patient agreeable to home health and would like a  to visit also for community resource needs.  Patient only owns a 4WW,  Patient uses [facility] pharmacy on [address]      CM will continue to monitor for DC needs.

## 2021-01-19 NOTE — THERAPY
Occupational Therapy  Daily Treatment     Patient Name: Luis Sepulveda  Age:  75 y.o., Sex:  male  Medical Record #: [id number]  Today's Date: 1/19/2021     Precautions  Precautions: (P) Fall Risk, Non Weight Bearing Right Lower Extremity  Comments: (P) R BKA, R IPOP, dialysis Tues, Thurs, Sat, L arm fistula    Safety   ADL Safety : Requires Supervision for Safety, Requires Physical Assist for Safety, Requires Cueing for Safety  Bathroom Safety: Requires Supervision for Safety, Requires Physical Assist for Safety, Requires Cuing for Safety  Comments: See below notes for ADL performance details.    Subjective    Pt agreeable to OT session.     Objective       01/19/21 1031   Precautions   Precautions Fall Risk;Non Weight Bearing Right Lower Extremity   Comments R BKA, R IPOP, dialysis Tues, Thurs, Sat, L arm fistula   Cognition    Level of Consciousness Alert   Functional Level of Assist   Grooming Independent;Seated   Grooming Description Seated in wheelchair at sink  (hand hygiene)   Toileting Stand by Assist   Toileting Description Grab bar;Increased time;Set-up of equipment;Supervision for safety;Verbal cueing   Toilet Transfers Minimal Assist   Toilet Transfer Description Grab bar;Increased time;Set-up of equipment;Supervision for safety;Verbal cueing  (w/c<>toilet SPT with GB)   Sitting Upper Body Exercises   Front Arm Raise 3 sets of 10;Right ;Left;Weight (See Comments for lbs)   Side Arm Raise 3 sets of 10;Right ;Left;Weight (See Comments for lbs)   Internal Shoulder Rotation 3 sets of 10;Right ;Left;Weight (See Comments for lbs)   External Shoulder Rotation 3 sets of 10;Right ;Left;Weight (See Comments for lbs)   Bicep Curls 3 sets of 10;Right ;Left;Weight (See Comments for lbs)   Upper Extremity Bike Level 2 Resistance  (FluidoBike for strength/endurance x15 min, 1 RB, 1.5km)   Interdisciplinary Plan of Care Collaboration   Patient Position at End of Therapy Seated;Self Releasing Lap Belt Applied;Chair  Alarm On;Call Light within Reach;Tray Table within Reach;Phone within Reach   OT Total Time Spent   OT Individual Total Time Spent (Mins) 60   OT Charge Group   OT Self Care / ADL 1   OT Therapeutic Exercise  3     Discussed home set up, bathroom equipment, w/c accessibility, etc. Pt reports that he has no LIZ or steps in home, no threshold. He was using a w/c to get around prior to admission 2/2 declining function. Bathroom currently has no GB, threshold shower w/ glass doors, HHSH and shower chair. Adalid reports that family plans to renovate bathroom with roll in shower and GB, however unsure of timeline.     Assessment    Pt tolerated OT session well. Motivated to improve his UB strength and endurance. Pt tolerated all exercises noted above with demonstration and cues for pacing to improve quality of movement. Pt continues to report pain in stomach, penis (from palacios cath), and R residual limb, however did not limit his participation in therapy session.     Strengths: Alert and oriented, Effective communication skills, Independent prior level of function, Making steady progress towards goals, Pleasant and cooperative, Supportive family, Willingly participates in therapeutic activities  Barriers: Decreased endurance, Fatigue, Impaired balance(safety awareness)    Plan    ADLs, IADLs and related functional mobility, Threshold shower transfers, BUE/Core strength, standing mike/balance.     Occupational Therapy Goals     Problem: Dressing     Dates: Start: 01/14/21       Goal: STG-Within one week, patient will dress LB     Dates: Start: 01/14/21       Description: 1) Individualized Goal:  with Min A and AE as needed  2) Interventions:  OT Group Therapy, OT Self Care/ADL, OT Cognitive Skill Dev, OT Community Reintegration, OT Manual Ther Technique, OT Neuro Re-Ed/Balance, OT Therapeutic Activity, OT Evaluation, and OT Therapeutic Exercise          Note:     Goal Note filed on 01/18/21 1149 by Romy Collins, OT     Requires max A.                         Problem: Functional Transfers     Dates: Start: 01/14/21       Goal: STG-Within one week, patient will transfer to toilet     Dates: Start: 01/14/21       Description: 1) Individualized Goal:  with CGA and AE as needed  2) Interventions:  OT Group Therapy, OT Self Care/ADL, OT Cognitive Skill Dev, OT Community Reintegration, OT Manual Ther Technique, OT Neuro Re-Ed/Balance, OT Therapeutic Activity, OT Evaluation, and OT Therapeutic Exercise       Note:     Goal Note filed on 01/18/21 1149 by Romy Collins, OT    Min A to sba.                   Goal: STG-Within one week, patient will transfer to tub/shower     Dates: Start: 01/14/21       Description: 1) Individualized Goal:  with CGA and AE as needed  2) Interventions:  OT Group Therapy, OT Self Care/ADL, OT Cognitive Skill Dev, OT Community Reintegration, OT Manual Ther Technique, OT Neuro Re-Ed/Balance, OT Therapeutic Activity, OT Evaluation, and OT Therapeutic Exercise    Note:     Goal Note filed on 01/18/21 1149 by Romy Collins, OT    To be addressed.                         Problem: OT Long Term Goals     Dates: Start: 01/14/21       Goal: LTG-By discharge, patient will complete basic self care tasks     Dates: Start: 01/14/21       Description: 1) Individualized Goal:  with mod I and AE as needed  2) Interventions:  OT Group Therapy, OT Self Care/ADL, OT Cognitive Skill Dev, OT Community Reintegration, OT Manual Ther Technique, OT Neuro Re-Ed/Balance, OT Therapeutic Activity, OT Evaluation, and OT Therapeutic Exercise      Note:     Goal Note filed on 01/14/21 1519 by Zeina Taylor MS,OTR/L    1) Individualized Goal:  with mod I and AE as needed  2) Interventions:  OT Group Therapy, OT Self Care/ADL, OT Cognitive Skill Dev, OT Community Reintegration, OT Manual Ther Technique, OT Neuro Re-Ed/Balance, OT Therapeutic Activity, OT Evaluation, and OT Therapeutic Exercise                   Goal: LTG-By discharge,  patient will perform bathroom transfers     Dates: Start: 01/14/21       Description: 1) Individualized Goal:  with mod I and AE as needed  2) Interventions:  OT Group Therapy, OT Self Care/ADL, OT Cognitive Skill Dev, OT Community Reintegration, OT Manual Ther Technique, OT Neuro Re-Ed/Balance, OT Therapeutic Activity, OT Evaluation, and OT Therapeutic Exercise       Note:     Goal Note filed on 01/14/21 1519 by Zeina Taylor MS,OTR/L    1) Individualized Goal:  with mod I and AE as needed  2) Interventions:  OT Group Therapy, OT Self Care/ADL, OT Cognitive Skill Dev, OT Community Reintegration, OT Manual Ther Technique, OT Neuro Re-Ed/Balance, OT Therapeutic Activity, OT Evaluation, and OT Therapeutic Exercise

## 2021-01-19 NOTE — PROGRESS NOTES
Hospital Medicine Daily Progress Note      Chief Complaint:  Hypertension  Afib  Diabetes  Leukocytosis  Penile wound    Interval History:  Pt c/o abd pain, no N/V.  Labs reviewed, has new leukocytosis but afebrile.    Review of Systems  Review of Systems   Constitutional: Negative for chills and fever.   HENT: Negative.    Eyes: Negative.    Respiratory: Negative for cough and shortness of breath.    Cardiovascular: Negative for chest pain and palpitations.   Gastrointestinal: Positive for abdominal pain. Negative for nausea and vomiting.   Musculoskeletal:        Wound pain    Skin: Negative for itching and rash.   Endo/Heme/Allergies: Negative for polydipsia. Does not bruise/bleed easily.        Physical Exam  Temp:  [36.4 °C (97.5 °F)-36.8 °C (98.2 °F)] 36.5 °C (97.7 °F)  Pulse:  [78-87] 78  Resp:  [16-18] 18  BP: (109-121)/(53-68) 110/68  SpO2:  [95 %-99 %] 95 %    Physical Exam  Vitals signs reviewed.   Constitutional:       General: He is not in acute distress.     Appearance: Normal appearance. He is not ill-appearing.   HENT:      Head: Normocephalic and atraumatic.      Right Ear: External ear normal.      Left Ear: External ear normal.      Nose: Nose normal.      Mouth/Throat:      Pharynx: Oropharynx is clear.   Eyes:      General:         Right eye: No discharge.         Left eye: No discharge.      Extraocular Movements: Extraocular movements intact.      Conjunctiva/sclera: Conjunctivae normal.   Neck:      Musculoskeletal: Normal range of motion and neck supple.   Cardiovascular:      Rate and Rhythm: Normal rate and regular rhythm.   Pulmonary:      Effort: No respiratory distress.      Breath sounds: No wheezing.      Comments: Decreased BS   Abdominal:      General: Bowel sounds are normal. There is no distension.      Palpations: Abdomen is soft.      Tenderness: There is no abdominal tenderness.   Musculoskeletal:      Left lower leg: No edema.      Comments: Right BKA dressed   Skin:      General: Skin is warm and dry.   Neurological:      Mental Status: He is alert and oriented to person, place, and time.         Fluids    Intake/Output Summary (Last 24 hours) at 1/19/2021 1501  Last data filed at 1/19/2021 1300  Gross per 24 hour   Intake 960 ml   Output 725 ml   Net 235 ml       Laboratory  Recent Labs     01/17/21  0444 01/19/21  0512   WBC 10.1 16.5*   RBC 2.72* 2.39*   HEMOGLOBIN 8.5* 7.7*   HEMATOCRIT 27.2* 23.9*   .0* 100.0*   MCH 31.3 32.2   MCHC 31.3* 32.2*   RDW 58.2* 57.4*   PLATELETCT 341 337   MPV 9.4 9.3     Recent Labs     01/17/21 0444 01/19/21  0512   SODIUM 133* 132*   POTASSIUM 3.8 4.5   CHLORIDE 95* 93*   CO2 27 24   GLUCOSE 163* 131*   BUN 19 50*   CREATININE 4.23* 7.67*   CALCIUM 9.1 8.7                 Assessment/Plan  * 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)- (present on admission)  Assessment & Plan  2/2 PVD and DM with non-healing wound and reported gangrene  S/P right BKA on 12/31/20 by Dr. Lynn  Wound care and pain control    Abrasion of penis with infection- (present on admission)  Assessment & Plan  Thought to be 2/2 Tomas trauma  Wound Cx +yeast  On Fluconazole    Paroxysmal A-fib (HCC)- (present on admission)  Assessment & Plan  Rate controlled on Metoprolol  Anticoagulated on Eliquis    End stage renal disease on dialysis ([facility])- (present on admission)  Assessment & Plan  On maintenance HD TTS per [facility]    Type 2 diabetes mellitus with kidney complication, with long-term current use of insulin ([facility])- (present on admission)  Assessment & Plan  HbA1c 5.2  Continue Lantus  Will increase SSI  Outpt meds include Lantus 5 units qhs    Anemia- (present on admission)  Assessment & Plan  Has macrocytic indices  Vit B12, Folate, and TSH levels all normal  On Epoetin per Nephrology  Check FOB x 3  Follow H/H    Essential hypertension- (present on admission)  Assessment & Plan  Decrease Metoprolol for bradycardia  Monitor for  orthostatic hypotension on Flomax    Hyperthyroidism- (present on admission)  Assessment & Plan  Euthyroid on Methimazole    Leukocytosis  Assessment & Plan  Request UA, CXR, and BCx x 2 R/O acute infection  Check Procalcitonin  Follow WBC    Abdominal pain  Assessment & Plan  Check KUB    Dyslipidemia- (present on admission)  Assessment & Plan  On Lipitor    DNR

## 2021-01-19 NOTE — THERAPY
"Speech Language Pathology  Daily Treatment     Patient Name: Luis Sepulveda  Age:  75 y.o., Sex:  male  Medical Record #: [id number]  Today's Date: 1/19/2021     Precautions  Precautions: Fall Risk, Non Weight Bearing Right Lower Extremity  Comments: R BKA, R IPOP, dialysis Tues, Thurs, Sat, L arm fistula    Subjective    Pt pleasant and cooperative. Pt sleeping upon arrival, when he awoke pt noted with dysarthric speech and pt reporting that tongue was \"swollen\" SLP informed OT and RN, with additional time pts speech noted to clear, pt reporting due to \"dry mouth.\" RN aware and to follow appropriately. Oral mec exam completed and WFL.      Objective       01/19/21 1303   SLP Total Time Spent   SLP Individual Total Time Spent (Mins) 60   Treatment Charges   SLP Cognitive Skill Development First 15 Minutes 1   SLP Cognitive Skill Development Additional 15 Minutes 3       Assessment    Pt pleasant and cooperative. Pt sleeping upon arrival, when he awoke pt noted with dysarthric speech and pt reporting that tongue was \"swollen\" SLP informed OT and RN, with additional time pts speech noted to clear, pt reporting due to \"dry mouth.\" RN aware and to follow appropriately. Oral mech exam completed and WFL. Memory log completed at this time - pt had not filled out indep and reported \"Janell just been in too much pain.\" Pt required MIN cues overall to recall items consumed, therapy completed and tasks presented thus far within the day. Pt expressing to this therapist that family is planning to move to [place] however current house does not have stairs and would be accessible with wc.    Strengths: Able to follow instructions, Independent prior level of function  Barriers: Impulsive, Impaired functional cognition, Impaired insight/denial of deficits    Plan    Attention, verbal memory and safety     Speech Therapy Problems     Problem: Memory STGs     Dates: Start: 01/14/21       Goal: STG-Within one week, patient will     " Dates: Start: 01/14/21       Description: 1) Individualized goal:  complete verbal recall tasks with brief (5-10 minute) delay with 70% accuracy provided MIN cues.   2) Interventions:  SLP Self Care / ADL Training , SLP Cognitive Skill Development, and SLP Group Treatment                Problem: Problem Solving STGs     Dates: Start: 01/14/21       Goal: STG-Within one week, patient will     Dates: Start: 01/14/21       Description: 1) Individualized goal:  complete complex attention tasks involving medication management with 80% accuracy provided SPV.   2) Interventions:  SLP Self Care / ADL Training , SLP Cognitive Skill Development, and SLP Group Treatment                Problem: Speech/Swallowing LTGs     Dates: Start: 01/14/21       Goal: LTG-By discharge, patient will     Dates: Start: 01/14/21       Description: 1) Individualized goal:  complete functional problem solving and recall information with 80% accuracy and MOD I.   2) Interventions:  SLP Self Care / ADL Training , SLP Cognitive Skill Development, and SLP Group Treatment

## 2021-01-19 NOTE — PROGRESS NOTES
"Rehab Progress Note     Date of Service: 1/19/2021  Chief Complaint: Follow-up BKA    Interval Events (Subjective)    Patient seen and examined today  In his room. He reports pain is currently well controlled. His residual limb was examined at bedside. He continues to complain of both penile and suprapubic pain. We discussed his discharge date for the 1/19. Patient has no other complaints today.     Objective:  VITAL SIGNS: /68   Pulse 78   Temp 36.5 °C (97.7 °F) (Oral)   Resp 18   Ht 1.651 m (5' 5\")   Wt 76.5 kg (168 lb 10.4 oz)   SpO2 95%   BMI 28.07 kg/m²   Gen: alert, no apparent distress  Msk: right residual limb with intact incision, no erythema or drainage    Recent Results (from the past 72 hour(s))   ACCU-CHEK GLUCOSE    Collection Time: 01/16/21  5:54 PM   Result Value Ref Range    Glucose - Accu-Ck 125 (H) 65 - 99 mg/dL   ACCU-CHEK GLUCOSE    Collection Time: 01/16/21  8:04 PM   Result Value Ref Range    Glucose - Accu-Ck 193 (H) 65 - 99 mg/dL   Comp Metabolic Panel    Collection Time: 01/17/21  4:44 AM   Result Value Ref Range    Sodium 133 (L) 135 - 145 mmol/L    Potassium 3.8 3.6 - 5.5 mmol/L    Chloride 95 (L) 96 - 112 mmol/L    Co2 27 20 - 33 mmol/L    Anion Gap 11.0 7.0 - 16.0    Glucose 163 (H) 65 - 99 mg/dL    Bun 19 8 - 22 mg/dL    Creatinine 4.23 (H) 0.50 - 1.40 mg/dL    Calcium 9.1 8.5 - 10.5 mg/dL    AST(SGOT) 19 12 - 45 U/L    ALT(SGPT) 10 2 - 50 U/L    Alkaline Phosphatase 106 (H) 30 - 99 U/L    Total Bilirubin 0.3 0.1 - 1.5 mg/dL    Albumin 3.3 3.2 - 4.9 g/dL    Total Protein 6.3 6.0 - 8.2 g/dL    Globulin 3.0 1.9 - 3.5 g/dL    A-G Ratio 1.1 g/dL   CBC WITH DIFFERENTIAL    Collection Time: 01/17/21  4:44 AM   Result Value Ref Range    WBC 10.1 4.8 - 10.8 K/uL    RBC 2.72 (L) 4.70 - 6.10 M/uL    Hemoglobin 8.5 (L) 14.0 - 18.0 g/dL    Hematocrit 27.2 (L) 42.0 - 52.0 %    .0 (H) 81.4 - 97.8 fL    MCH 31.3 27.0 - 33.0 pg    MCHC 31.3 (L) 33.7 - 35.3 g/dL    RDW 58.2 (H) 35.9 " - 50.0 fL    Platelet Count 341 164 - 446 K/uL    MPV 9.4 9.0 - 12.9 fL    Neutrophils-Polys 67.10 44.00 - 72.00 %    Lymphocytes 14.70 (L) 22.00 - 41.00 %    Monocytes 13.10 0.00 - 13.40 %    Eosinophils 0.10 0.00 - 6.90 %    Basophils 0.60 0.00 - 1.80 %    Immature Granulocytes 4.40 (H) 0.00 - 0.90 %    Nucleated RBC 0.30 /100 WBC    Neutrophils (Absolute) 6.75 1.82 - 7.42 K/uL    Lymphs (Absolute) 1.48 1.00 - 4.80 K/uL    Monos (Absolute) 1.32 (H) 0.00 - 0.85 K/uL    Eos (Absolute) 0.01 0.00 - 0.51 K/uL    Baso (Absolute) 0.06 0.00 - 0.12 K/uL    Immature Granulocytes (abs) 0.44 (H) 0.00 - 0.11 K/uL    NRBC (Absolute) 0.03 K/uL   ESTIMATED GFR    Collection Time: 01/17/21  4:44 AM   Result Value Ref Range    GFR If  17 (A) >60 mL/min/1.73 m 2    GFR If Non  14 (A) >60 mL/min/1.73 m 2   ACCU-CHEK GLUCOSE    Collection Time: 01/17/21  7:48 AM   Result Value Ref Range    Glucose - Accu-Ck 143 (H) 65 - 99 mg/dL   SARS-CoV-2 PCR (24 hour In-House): Collect NP swab in [facility]    Collection Time: 01/17/21  9:00 AM    Specimen: Nasopharyngeal; Respirate   Result Value Ref Range    SARS-CoV-2 Source NP Swab     SARS-CoV-2 by PCR NotDetected    ACCU-CHEK GLUCOSE    Collection Time: 01/17/21 11:06 AM   Result Value Ref Range    Glucose - Accu-Ck 203 (H) 65 - 99 mg/dL   ACCU-CHEK GLUCOSE    Collection Time: 01/17/21  5:13 PM   Result Value Ref Range    Glucose - Accu-Ck 172 (H) 65 - 99 mg/dL   ACCU-CHEK GLUCOSE    Collection Time: 01/17/21  8:33 PM   Result Value Ref Range    Glucose - Accu-Ck 208 (H) 65 - 99 mg/dL   ACCU-CHEK GLUCOSE    Collection Time: 01/18/21  7:15 AM   Result Value Ref Range    Glucose - Accu-Ck 157 (H) 65 - 99 mg/dL   ACCU-CHEK GLUCOSE    Collection Time: 01/18/21 11:04 AM   Result Value Ref Range    Glucose - Accu-Ck 179 (H) 65 - 99 mg/dL   URINALYSIS    Collection Time: 01/18/21  4:40 PM    Specimen: Urine, Cath   Result Value Ref Range    Color Yellow     Character Clear      Specific Gravity 1.014 <1.035    Ph 7.0 5.0 - 8.0    Glucose 500 (A) Negative mg/dL    Ketones Negative Negative mg/dL    Protein 100 (A) Negative mg/dL    Bilirubin Negative Negative    Urobilinogen, Urine 0.2 Negative    Nitrite Negative Negative    Leukocyte Esterase Trace (A) Negative    Occult Blood Small (A) Negative    Micro Urine Req Microscopic    URINE MICROSCOPIC (W/UA)    Collection Time: 01/18/21  4:40 PM   Result Value Ref Range    WBC 5-10 (A) /hpf    RBC 5-10 (A) /hpf    Bacteria Negative None /hpf    Epithelial Cells Negative /hpf    Hyaline Cast 3-5 (A) /lpf   ACCU-CHEK GLUCOSE    Collection Time: 01/18/21  5:41 PM   Result Value Ref Range    Glucose - Accu-Ck 162 (H) 65 - 99 mg/dL   ACCU-CHEK GLUCOSE    Collection Time: 01/18/21  8:15 PM   Result Value Ref Range    Glucose - Accu-Ck 162 (H) 65 - 99 mg/dL   CBC WITH DIFFERENTIAL    Collection Time: 01/19/21  5:12 AM   Result Value Ref Range    WBC 16.5 (H) 4.8 - 10.8 K/uL    RBC 2.39 (L) 4.70 - 6.10 M/uL    Hemoglobin 7.7 (L) 14.0 - 18.0 g/dL    Hematocrit 23.9 (L) 42.0 - 52.0 %    .0 (H) 81.4 - 97.8 fL    MCH 32.2 27.0 - 33.0 pg    MCHC 32.2 (L) 33.7 - 35.3 g/dL    RDW 57.4 (H) 35.9 - 50.0 fL    Platelet Count 337 164 - 446 K/uL    MPV 9.3 9.0 - 12.9 fL    Neutrophils-Polys 77.60 (H) 44.00 - 72.00 %    Lymphocytes 10.50 (L) 22.00 - 41.00 %    Monocytes 10.40 0.00 - 13.40 %    Eosinophils 0.10 0.00 - 6.90 %    Basophils 0.30 0.00 - 1.80 %    Immature Granulocytes 1.10 (H) 0.00 - 0.90 %    Nucleated RBC 0.00 /100 WBC    Neutrophils (Absolute) 12.81 (H) 1.82 - 7.42 K/uL    Lymphs (Absolute) 1.74 1.00 - 4.80 K/uL    Monos (Absolute) 1.71 (H) 0.00 - 0.85 K/uL    Eos (Absolute) 0.01 0.00 - 0.51 K/uL    Baso (Absolute) 0.05 0.00 - 0.12 K/uL    Immature Granulocytes (abs) 0.18 (H) 0.00 - 0.11 K/uL    NRBC (Absolute) 0.00 K/uL   Renal Function Panel    Collection Time: 01/19/21  5:12 AM   Result Value Ref Range    Sodium 132 (L) 135 - 145 mmol/L     Potassium 4.5 3.6 - 5.5 mmol/L    Chloride 93 (L) 96 - 112 mmol/L    Co2 24 20 - 33 mmol/L    Glucose 131 (H) 65 - 99 mg/dL    Creatinine 7.67 (HH) 0.50 - 1.40 mg/dL    Bun 50 (H) 8 - 22 mg/dL    Calcium 8.7 8.5 - 10.5 mg/dL    Phosphorus 4.1 2.5 - 4.5 mg/dL    Albumin 3.1 (L) 3.2 - 4.9 g/dL   ESTIMATED GFR    Collection Time: 01/19/21  5:12 AM   Result Value Ref Range    GFR If  8 (A) >60 mL/min/1.73 m 2    GFR If Non African American 7 (A) >60 mL/min/1.73 m 2   ACCU-CHEK GLUCOSE    Collection Time: 01/19/21  7:06 AM   Result Value Ref Range    Glucose - Accu-Ck 119 (H) 65 - 99 mg/dL   ACCU-CHEK GLUCOSE    Collection Time: 01/19/21 11:15 AM   Result Value Ref Range    Glucose - Accu-Ck 148 (H) 65 - 99 mg/dL       Current Facility-Administered Medications   Medication Frequency   • insulin glargine (Lantus) injection QAM INSULIN   • metoprolol (LOPRESSOR) tablet 25 mg TWICE DAILY   • tamsulosin (FLOMAX) capsule 0.8 mg QHS   • fluconazole (DIFLUCAN) tablet 200 mg DAILY   • insulin regular (HumuLIN R,NovoLIN R) injection 4X/DAY ACHS    And   • dextrose 50% (D50W) injection 50 mL Q15 MIN PRN   • gabapentin (NEURONTIN) capsule 300 mg DAILY   • heparin injection 1,800 Units DIALYSIS PRN   • hydrOXYzine HCl (ATARAX) tablet 50 mg Q6HRS PRN   • melatonin tablet 3 mg HS PRN   • Respiratory Therapy Consult Continuous RT   • Pharmacy Consult Request ...Pain Management Review 1 Each PHARMACY TO DOSE   • acetaminophen (Tylenol) tablet 650 mg Q4HRS PRN   • lactulose 20 GM/30ML solution 30 mL QDAY PRN   • docusate sodium (ENEMEEZ) enema 283 mg QDAY PRN   • fleet enema 133 mL QDAY PRN   • artificial tears ophthalmic solution 1 Drop PRN   • benzocaine-menthol (CEPACOL) lozenge 1 Lozenge Q2HRS PRN   • mag hydrox-al hydrox-simeth (MAALOX PLUS ES or MYLANTA DS) suspension 20 mL Q2HRS PRN   • ondansetron (ZOFRAN ODT) dispertab 4 mg 4X/DAY PRN    Or   • ondansetron (ZOFRAN) syringe/vial injection 4 mg 4X/DAY PRN   •  traZODone (DESYREL) tablet 50 mg QHS PRN   • sodium chloride (OCEAN) 0.65 % nasal spray 2 Spray PRN   • traMADol (ULTRAM) 50 MG tablet 50 mg Q4HRS PRN   • atorvastatin (LIPITOR) tablet 40 mg Q EVENING   • calcitRIOL (ROCALTROL) capsule 0.25 mcg DAILY   • epoetin (Retacrit) injection (Dialysis use only) 10,000 Units TUE+THU+SAT   • methimazole (TAPAZOLE) tablet 5 mg Q EVENING   • methimazole (TAPAZOLE) tablet 7.5 mg QAM   • omeprazole (PRILOSEC) capsule 20 mg DAILY   • oxyCODONE immediate-release (ROXICODONE) tablet 5 mg Q4HRS PRN   • senna-docusate (PERICOLACE or SENOKOT S) 8.6-50 MG per tablet 2 Tab BID    And   • magnesium hydroxide (MILK OF MAGNESIA) suspension 30 mL QDAY PRN    And   • bisacodyl (DULCOLAX) suppository 10 mg QDAY PRN   • apixaban (ELIQUIS) tablet 2.5 mg BID       Orders Placed This Encounter   Procedures   • Diet Order Diet: Renal; Second Modifier: (optional): Consistent CHO (Diabetic)     Standing Status:   Standing     Number of Occurrences:   1     Order Specific Question:   Diet:     Answer:   Renal [8]     Order Specific Question:   Second Modifier: (optional)     Answer:   Consistent CHO (Diabetic) [4]       Assessment:  Active Hospital Problems    Diagnosis   • *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)   • Leukocytosis   • Urinary retention   • Paroxysmal A-fib (HCC)   • End stage renal disease on dialysis (HCC)   • Type 2 diabetes mellitus with kidney complication, with long-term current use of insulin (HCC)   • Essential hypertension   • Anemia   • Hyperthyroidism   • Dyslipidemia   • Hypokalemia   • Impaired mobility and ADLs     This patient is a 75 y.o. male admitted for acute inpatient rehabilitation with 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).    I led and attended the weekly conference, and agree with the IDT conference documentation and plan of care as noted below.    Date of conference: 1/18/2021    Goals and barriers: See IDT  note.    Biggest barriers: urinary retention, amputation, high fall risk during transfers, generalized weakness, indwelling cath, rigidity in thinking, mild cognitive impairments with memory and attention, hyper-verbose    Goals in next week: discharge    CM/social support: ex-wife and son work during the day    Anticipated DC date: 1/29    Home health: PT/OT/SLP/RN    Equip: ramp, WC, FWW    Follow up: PCP, surgery, Dr. Elliott, urology, nephrology    Medical Decision Making and Plan:    Right BKA  12/31 Dr. Lynn  Continue full rehab program  PT/OT/SLP, 1 hr each discipline, 5 days per week     Outpatient follow-up for suture removal after discharge with surgery  Outpatient follow-up with Dr. Elliott, referral made    Phantom pain, improved  Continue gabapentin 300 mg, maximum due to renal failure  Outpatient follow-up with Dr. Elliott, referral made     Encephalopathy, resolved  Speech therapy for cognitive assessment  Currently only with mild memory deficits    ?Depression  Psychology consult, Dr Izaguirre     Urinary retention, continues  Has very high residuals  Continue intermittent caths for over 400 cc, patient refusing  Started Flomax 0.4 mg 1/14 --> 0.8 mg 1/16  PRN lidocaine jelly for caths  Placed indwelling catheter for bladder rest 1/17  Referral to urology made, suspect BPH as cause    Bowel program  History of diabetic gastroparesis  Continue bowel medications  Scheduled Sennakot  PRN Miralax, MOM, bisacodyl suppository  Last BM 1/18    DVT prophylaxis  Eliquis     Appreciate the assistance of the hospitalist with his medical comorbidities:     End-stage renal disease on hemodialysis  Consulted nephrology  Continue calcitriol 0.25 mcg daily  Continue Retacrit with dialysis  Continue K-Phos 250 mg 2 times daily     Diabetes with hyperglycemia  Sliding scale insulin  Lantus 5 units daily --> 8 units 1/19     Peripheral artery disease  Continue Eliquis, renal dosing  Continue statin     Coronary artery  disease  With history of stents  Continue statin     Paroxysmal atrial fibrillation  Continue Eliquis, renal dosing  Continue metoprolol 37.5 mg twice daily --> dose decreased to 25 mg twice daily     Hypertension  Hypotension  Continue metoprolol 37.5 mg twice daily --> dose decreased to 25 mg twice daily     Hyperlipidemia  Continue statin     Hyperthyroidism  Continue methimazole 7.5 mg in the morning, 5 mg in the evening     GERD  Continue omeprazole 20 mg     Anemia  Likely of chronic renal disease  Continue Retacrit     Leukocytosis, resolved  Recently treated with IV antibiotics for pneumonia    Elevated procalcitonin, improved  Recently treated with IV antibiotics for pneumonia    Penile discharge  Culture with yeast, continue Diflucan    Leukocytosis  Residual limb looks good  No respiratory symptoms  More like from UTI, culture pending    COVID negative 1/11, re-screen 1/17 negative    Total time:  26 minutes.  I spent greater than 50% of the time for patient care, counseling, and coordination on this date, including patient face-to face time, unit/floor time with review of records/pertinent lab data and studies, as well as discussing diagnostic evaluation/work up, planned therapeutic interventions, and future disposition of care, as per the interval events/subjective and the assessment and plan as noted above.    I have performed a physical exam, reviewed and updated ROS, as well as the assessment and plan today 1/19/2021. In review of note from 1/18/21 there are no new changes except as documented above.        Lata Goodman M.D.   Physical Medicine and Rehabilitation

## 2021-01-19 NOTE — PROGRESS NOTES
"Nephrology Daily Progress Note    Author: Hui ECKERT  Collaborating Physician: Mairo Hunter MD  Date of Service  1/19/2021    Chief Complaint  A 75-year-old male with end-stage renal disease   on chronic hemodialysis Tuesdays, Thursdays and Saturdays.  The patient was   admitted on 12/31 for right below-the-knee amputation.  He had wound and   gangrene of the right foot.  He has had some peripheral vascular procedures   previously.  He did have a right ray amputation in 11/2020.  He has had a   nonhealing wound and he was admitted for right below-the-knee amputation that   was performed on 12/31.  We have been consulted to assist in his dialysis   needs.  He had dialysis on 12/31 prior to admission.  Today, he is feeling   dyspneic.  He has no cough or sputum production.    Daily Nephrology Summary   1/13- Transferred to Rehab  1/14 - Pt sitting up in W/C in room, denies any complaints/concerns, due for HD today, BP labile, K+ 3.1, per chart review-penile discharge cx sent  1/15 - Pt getting up to restroom to shower with assistance, HD yesterday with 2.1L UF, K+ corrected to 3.6, iron stores replete, no growth on penile wound cx, VSS on RA   1/16 - No overnight events, HD later today.  No labs for review.  Sitting up at bedside eating lunch.  No complaints.   1/18 - No new events. Pt with palacios. C/O GI issues, bowels are \"loose\". No new events.   1/19 - HD today. VSS. No new events.Pt seen during therapy. C/O about palacios catheter.    Review of Systems  Review of Systems   Constitutional: Positive for malaise/fatigue. Negative for chills and fever.   HENT: Negative.    Eyes: Negative.    Respiratory: Negative.    Cardiovascular: Negative.    Gastrointestinal: Positive for abdominal pain. Negative for nausea and vomiting.        Nonspecific, stomach \"is off\" since restarting solid food.   Genitourinary:        Palacios catheter   Musculoskeletal: Positive for joint pain. Negative for back pain and myalgias.    "     C/O RLE phantom pain   Skin: Negative.    Neurological: Negative.    Endo/Heme/Allergies: Negative.    Psychiatric/Behavioral: Negative.       Physical Exam  Temp:  [36.4 °C (97.5 °F)-36.8 °C (98.2 °F)] 36.4 °C (97.5 °F)  Pulse:  [66-92] 84  Resp:  [16-18] 18  BP: ()/(50-65) 109/65  SpO2:  [96 %-99 %] 96 %    Physical Exam  Constitutional:       Appearance: Normal appearance.   HENT:      Head: Normocephalic and atraumatic.      Nose: Nose normal.      Mouth/Throat:      Mouth: Mucous membranes are moist.      Pharynx: Oropharynx is clear.   Eyes:      Extraocular Movements: Extraocular movements intact.      Pupils: Pupils are equal, round, and reactive to light.   Neck:      Musculoskeletal: Normal range of motion and neck supple.   Cardiovascular:      Rate and Rhythm: Normal rate and regular rhythm.      Pulses: Normal pulses.      Comments: L AVF +T/B  Pulmonary:      Effort: Pulmonary effort is normal.      Breath sounds: Normal breath sounds.   Abdominal:      General: Bowel sounds are normal.      Palpations: Abdomen is soft.   Musculoskeletal:         General: No deformity.      Comments: R BKA    Skin:     General: Skin is warm and dry.   Neurological:      General: No focal deficit present.      Mental Status: He is alert and oriented to person, place, and time.   Psychiatric:         Mood and Affect: Mood normal.         Behavior: Behavior normal.         Fluids    Intake/Output Summary (Last 24 hours) at 1/19/2021 0930  Last data filed at 1/19/2021 0900  Gross per 24 hour   Intake 1320 ml   Output 825 ml   Net 495 ml       Laboratory  Recent Labs     01/17/21 0444 01/19/21  0512   WBC 10.1 16.5*   RBC 2.72* 2.39*   HEMOGLOBIN 8.5* 7.7*   HEMATOCRIT 27.2* 23.9*   .0* 100.0*   MCH 31.3 32.2   MCHC 31.3* 32.2*   RDW 58.2* 57.4*   PLATELETCT 341 337   MPV 9.4 9.3     Recent Labs     01/17/21 0444 01/19/21  0512   SODIUM 133* 132*   POTASSIUM 3.8 4.5   CHLORIDE 95* 93*   CO2 27 24    GLUCOSE 163* 131*   BUN 19 50*   CREATININE 4.23* 7.67*   CALCIUM 9.1 8.7         No results for input(s): NTPROBNP in the last 72 hours.        Imaging  Available labs, imaging and clinical documentation reviewed.     Assessment/Plan  #  End-stage renal disease, on chronic hemodialysis qTTS  - Normally dialyzes at [facility] via L AVF   #  Status post right BKA on 12/31/2020 for diabetic ulcer.  #  Hypertension, fair control.  - On metoprolol   #  Anemia: superimposed blood loss in addition to chronic kidney disease.  - Hgb below target   - On ARMANDO   - Iron replete, ferritin elevated   #  Diabetes mellitus.  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  - On calcitriol and calcium acetate   - Calcium acetate currently being held   - Vit D 48   - Phos 4.1  - iPTH 109   #  Peripheral vascular disease.   #  Paroxysmal atrial fibrillation, on Eliquis and metoprolol.   #  Coronary artery disease, status post stents.  Asymptomatic.  #  History of hyperthyroidism. On methimazole.   #  Dyslipidemia, on statin therapy.   #  Past history of heavy tobacco use.  #  Pneumonia: s/p IV Zosyn   #  Encephalopathy: resolving  #  Weakness/debility  #  Urinary retention requiring catheter placement  - Chronic oliguria  #  Hypokalemia, corrected  #  Hyponatremia in ESRD, mild, improved  #  Penile meatus wound  - Tomas  - NGTD on cx      PLAN:  -HD today (Tues)  -Continue hemodialysis qTTS/PRN  -UF as tolerated  -PO4 binder on hold  -ARMANDO TIW with HD, goal Hgb 10-11   -Transfuse PRN Hgb <7  -Continue home medications   -Goal BP <140/90  -No dietary protein restrictions  -Dose meds for ESRD  -Limit narcotics/sedating meds  -PT/OT/Speech  -Follow labs    Thank you,

## 2021-01-19 NOTE — CARE PLAN
Problem: Safety  Goal: Will remain free from injury  Outcome: PROGRESSING AS EXPECTED   Pt uses call light consistently and appropriately. Waits for assistance does not attempt self transfer this shift. Able to verbalize needs.   Problem: Urinary Elimination:  Goal: Ability to reestablish a normal urinary elimination pattern will improve  Outcome: PROGRESSING AS EXPECTED   Patient with indwelling catheter in place draining adequate amounts of clear yellow urine to gravity; skin intact; no leakage.  Denies flank pain or dysuria; afebrile.  Will continue to monitor.   Problem: Pain Management  Goal: Pain level will decrease to patient's comfort goal  Outcome: PROGRESSING AS EXPECTED   Patient able to verbalize pain level and verbalize an acceptable level of pain.

## 2021-01-20 NOTE — PROGRESS NOTES
"Nephrology Daily Progress Note    Author: Hui ECKERT  Collaborating Physician: Mario Hunter MD  Date of Service  1/20/2021    Chief Complaint  A 75-year-old male with end-stage renal disease   on chronic hemodialysis Tuesdays, Thursdays and Saturdays.  The patient was   admitted on 12/31 for right below-the-knee amputation.  He had wound and   gangrene of the right foot.  He has had some peripheral vascular procedures   previously.  He did have a right ray amputation in 11/2020.  He has had a   nonhealing wound and he was admitted for right below-the-knee amputation that   was performed on 12/31.  We have been consulted to assist in his dialysis   needs.  He had dialysis on 12/31 prior to admission.  Today, he is feeling   dyspneic.  He has no cough or sputum production.    Daily Nephrology Summary   1/13- Transferred to Rehab  1/14 - Pt sitting up in W/C in room, denies any complaints/concerns, due for HD today, BP labile, K+ 3.1, per chart review-penile discharge cx sent  1/15 - Pt getting up to restroom to shower with assistance, HD yesterday with 2.1L UF, K+ corrected to 3.6, iron stores replete, no growth on penile wound cx, VSS on RA   1/16 - No overnight events, HD later today.  No labs for review.  Sitting up at bedside eating lunch.  No complaints.   1/18 - No new events. Pt with palacios. C/O GI issues, bowels are \"loose\". No new events.   1/19 - HD today. VSS. No new events.Pt seen during therapy. C/O about palacios catheter.  1/20 - HD yest, UF 1.7L. Occult blood stool +. CXR improved. Abd xray no obstruction. Abd pain improved after 2 large BMs. C/O palacios catheter.    Review of Systems  Review of Systems   Constitutional: Positive for malaise/fatigue. Negative for chills and fever.   HENT: Negative.    Eyes: Negative.    Respiratory: Negative.    Cardiovascular: Negative.    Gastrointestinal: Negative for nausea and vomiting.        Nonspecific, stomach \"is off\" since restarting solid food but is " feeling better today after 2 lg bms    Genitourinary:        Tomas catheter   Musculoskeletal: Positive for joint pain. Negative for back pain and myalgias.        C/O RLE phantom pain   Skin: Negative.    Neurological: Negative.    Endo/Heme/Allergies: Negative.    Psychiatric/Behavioral: Negative.       Physical Exam  Temp:  [36.3 °C (97.3 °F)-37.2 °C (99 °F)] 36.3 °C (97.3 °F)  Pulse:  [74-92] 74  Resp:  [18] 18  BP: ()/(40-68) 94/48  SpO2:  [92 %-95 %] 92 %    Physical Exam  Constitutional:       Appearance: Normal appearance.   HENT:      Head: Normocephalic and atraumatic.      Nose: Nose normal.      Mouth/Throat:      Mouth: Mucous membranes are moist.      Pharynx: Oropharynx is clear.   Eyes:      Extraocular Movements: Extraocular movements intact.      Pupils: Pupils are equal, round, and reactive to light.   Neck:      Musculoskeletal: Normal range of motion and neck supple.   Cardiovascular:      Rate and Rhythm: Normal rate and regular rhythm.      Pulses: Normal pulses.      Comments: L AVF +T/B  Pulmonary:      Effort: Pulmonary effort is normal.      Breath sounds: Normal breath sounds.   Abdominal:      General: Bowel sounds are normal.      Palpations: Abdomen is soft.   Musculoskeletal:         General: No deformity.      Comments: R BKA    Skin:     General: Skin is warm and dry.   Neurological:      General: No focal deficit present.      Mental Status: He is alert and oriented to person, place, and time.   Psychiatric:         Mood and Affect: Mood normal.         Behavior: Behavior normal.         Fluids    Intake/Output Summary (Last 24 hours) at 1/20/2021 0942  Last data filed at 1/20/2021 0900  Gross per 24 hour   Intake 1580 ml   Output 2975 ml   Net -1395 ml       Laboratory  Recent Labs     01/19/21  0512 01/20/21  0520   WBC 16.5* 10.9*   RBC 2.39* 2.43*   HEMOGLOBIN 7.7* 7.6*   HEMATOCRIT 23.9* 24.4*   .0* 100.4*   MCH 32.2 31.3   MCHC 32.2* 31.1*   RDW 57.4* 60.2*    PLATELETCT 337 357   MPV 9.3 9.5     Recent Labs     01/19/21  0512 01/20/21  0520   SODIUM 132* 135   POTASSIUM 4.5 3.9   CHLORIDE 93* 95*   CO2 24 28   GLUCOSE 131* 106*   BUN 50* 25*   CREATININE 7.67* 4.15*   CALCIUM 8.7 8.6         No results for input(s): NTPROBNP in the last 72 hours.        Imaging  Available labs, imaging and clinical documentation reviewed.     Assessment/Plan  #  End-stage renal disease, on chronic hemodialysis qTTS  - Normally dialyzes at [facility] via L AVF   #  Status post right BKA on 12/31/2020 for diabetic ulcer.  #  Hypertension, fair control.  - On metoprolol   #  Anemia: superimposed blood loss in addition to chronic kidney disease.  - Hgb below target   - On ARMANDO   - Iron replete, ferritin elevated   #  Diabetes mellitus.  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  - On calcitriol and calcium acetate   - Calcium acetate currently being held   - Vit D 48   - Phos 4.1  - iPTH 109   #  Peripheral vascular disease.   #  Paroxysmal atrial fibrillation, on Eliquis and metoprolol.   #  Coronary artery disease, status post stents.  Asymptomatic.  #  History of hyperthyroidism. On methimazole.   #  Dyslipidemia, on statin therapy.   #  Past history of heavy tobacco use.  #  Pneumonia: s/p IV Zosyn   #  Encephalopathy: resolving  #  Weakness/debility  #  Urinary retention requiring catheter placement  - Chronic oliguria  #  Hypokalemia, corrected  #  Hyponatremia in ESRD, mild, improved  #  Penile meatus wound  - Tomas  - NGTD on cx      PLAN:  -No HD today (Wed)  -Continue hemodialysis qTTS/PRN  -UF as tolerated  -PO4 binder on hold  -ARMANDO TIW with HD, goal Hgb 10-11   -Transfuse PRN Hgb <7  -Continue home medications   -Goal BP <140/90  -No dietary protein restrictions  -Dose meds for ESRD  -Limit narcotics/sedating meds  -PT/OT/Speech  -Pt will likely need urology consult for urinary retention    Thank you,

## 2021-01-20 NOTE — PROGRESS NOTES
"Rehab Progress Note     Date of Service: 1/20/2021  Chief Complaint: Follow-up BKA    Interval Events (Subjective)    Patient seen and examined today in the gym.  He is playing golf Wii with a recreational therapist.  He denies any residual limb pain and just reports continued discomfort and pain associated with his catheter.  We discussed another voiding trial on Monday which he is happy about.  Patient has no other complaints.    Objective:  VITAL SIGNS: BP (!) 94/48   Pulse 74   Temp 36.3 °C (97.3 °F) (Oral)   Resp 18   Ht 1.651 m (5' 5\")   Wt 76.5 kg (168 lb 10.4 oz)   SpO2 92%   BMI 28.07 kg/m²   Gen: alert, no apparent distress  Msk: Right transtibial amputation    Recent Results (from the past 72 hour(s))   ACCU-CHEK GLUCOSE    Collection Time: 01/17/21  5:13 PM   Result Value Ref Range    Glucose - Accu-Ck 172 (H) 65 - 99 mg/dL   ACCU-CHEK GLUCOSE    Collection Time: 01/17/21  8:33 PM   Result Value Ref Range    Glucose - Accu-Ck 208 (H) 65 - 99 mg/dL   ACCU-CHEK GLUCOSE    Collection Time: 01/18/21  7:15 AM   Result Value Ref Range    Glucose - Accu-Ck 157 (H) 65 - 99 mg/dL   ACCU-CHEK GLUCOSE    Collection Time: 01/18/21 11:04 AM   Result Value Ref Range    Glucose - Accu-Ck 179 (H) 65 - 99 mg/dL   URINALYSIS    Collection Time: 01/18/21  4:40 PM    Specimen: Urine, Cath   Result Value Ref Range    Color Yellow     Character Clear     Specific Gravity 1.014 <1.035    Ph 7.0 5.0 - 8.0    Glucose 500 (A) Negative mg/dL    Ketones Negative Negative mg/dL    Protein 100 (A) Negative mg/dL    Bilirubin Negative Negative    Urobilinogen, Urine 0.2 Negative    Nitrite Negative Negative    Leukocyte Esterase Trace (A) Negative    Occult Blood Small (A) Negative    Micro Urine Req Microscopic    URINE MICROSCOPIC (W/UA)    Collection Time: 01/18/21  4:40 PM   Result Value Ref Range    WBC 5-10 (A) /hpf    RBC 5-10 (A) /hpf    Bacteria Negative None /hpf    Epithelial Cells Negative /hpf    Hyaline Cast 3-5 " (A) /lpf   ACCU-CHEK GLUCOSE    Collection Time: 01/18/21  5:41 PM   Result Value Ref Range    Glucose - Accu-Ck 162 (H) 65 - 99 mg/dL   ACCU-CHEK GLUCOSE    Collection Time: 01/18/21  8:15 PM   Result Value Ref Range    Glucose - Accu-Ck 162 (H) 65 - 99 mg/dL   CBC WITH DIFFERENTIAL    Collection Time: 01/19/21  5:12 AM   Result Value Ref Range    WBC 16.5 (H) 4.8 - 10.8 K/uL    RBC 2.39 (L) 4.70 - 6.10 M/uL    Hemoglobin 7.7 (L) 14.0 - 18.0 g/dL    Hematocrit 23.9 (L) 42.0 - 52.0 %    .0 (H) 81.4 - 97.8 fL    MCH 32.2 27.0 - 33.0 pg    MCHC 32.2 (L) 33.7 - 35.3 g/dL    RDW 57.4 (H) 35.9 - 50.0 fL    Platelet Count 337 164 - 446 K/uL    MPV 9.3 9.0 - 12.9 fL    Neutrophils-Polys 77.60 (H) 44.00 - 72.00 %    Lymphocytes 10.50 (L) 22.00 - 41.00 %    Monocytes 10.40 0.00 - 13.40 %    Eosinophils 0.10 0.00 - 6.90 %    Basophils 0.30 0.00 - 1.80 %    Immature Granulocytes 1.10 (H) 0.00 - 0.90 %    Nucleated RBC 0.00 /100 WBC    Neutrophils (Absolute) 12.81 (H) 1.82 - 7.42 K/uL    Lymphs (Absolute) 1.74 1.00 - 4.80 K/uL    Monos (Absolute) 1.71 (H) 0.00 - 0.85 K/uL    Eos (Absolute) 0.01 0.00 - 0.51 K/uL    Baso (Absolute) 0.05 0.00 - 0.12 K/uL    Immature Granulocytes (abs) 0.18 (H) 0.00 - 0.11 K/uL    NRBC (Absolute) 0.00 K/uL   Renal Function Panel    Collection Time: 01/19/21  5:12 AM   Result Value Ref Range    Sodium 132 (L) 135 - 145 mmol/L    Potassium 4.5 3.6 - 5.5 mmol/L    Chloride 93 (L) 96 - 112 mmol/L    Co2 24 20 - 33 mmol/L    Glucose 131 (H) 65 - 99 mg/dL    Creatinine 7.67 (HH) 0.50 - 1.40 mg/dL    Bun 50 (H) 8 - 22 mg/dL    Calcium 8.7 8.5 - 10.5 mg/dL    Phosphorus 4.1 2.5 - 4.5 mg/dL    Albumin 3.1 (L) 3.2 - 4.9 g/dL   ESTIMATED GFR    Collection Time: 01/19/21  5:12 AM   Result Value Ref Range    GFR If  8 (A) >60 mL/min/1.73 m 2    GFR If Non African American 7 (A) >60 mL/min/1.73 m 2   ACCU-CHEK GLUCOSE    Collection Time: 01/19/21  7:06 AM   Result Value Ref Range     Glucose - Accu-Ck 119 (H) 65 - 99 mg/dL   ACCU-CHEK GLUCOSE    Collection Time: 01/19/21 11:15 AM   Result Value Ref Range    Glucose - Accu-Ck 148 (H) 65 - 99 mg/dL   BLOOD CULTURE    Collection Time: 01/19/21  3:15 PM    Specimen: Peripheral; Blood   Result Value Ref Range    Significant Indicator NEG     Source BLD     Site PERIPHERAL     Culture Result       No Growth  Note: Blood cultures are incubated for 5 days and  are monitored continuously.Positive blood cultures  are called to the RN and reported as soon as  they are identified.     BLOOD CULTURE    Collection Time: 01/19/21  3:50 PM    Specimen: Peripheral; Blood   Result Value Ref Range    Significant Indicator NEG     Source BLD     Site PERIPHERAL     Culture Result       No Growth  Note: Blood cultures are incubated for 5 days and  are monitored continuously.Positive blood cultures  are called to the RN and reported as soon as  they are identified.     ACCU-CHEK GLUCOSE    Collection Time: 01/19/21  5:20 PM   Result Value Ref Range    Glucose - Accu-Ck 131 (H) 65 - 99 mg/dL   OCCULT BLOOD X3 (STOOL)    Collection Time: 01/19/21  5:45 PM   Result Value Ref Range    Occult Blood 1 Positive (A) Negative   ACCU-CHEK GLUCOSE    Collection Time: 01/19/21  8:47 PM   Result Value Ref Range    Glucose - Accu-Ck 162 (H) 65 - 99 mg/dL   CBC WITHOUT DIFFERENTIAL    Collection Time: 01/20/21  5:20 AM   Result Value Ref Range    WBC 10.9 (H) 4.8 - 10.8 K/uL    RBC 2.43 (L) 4.70 - 6.10 M/uL    Hemoglobin 7.6 (L) 14.0 - 18.0 g/dL    Hematocrit 24.4 (L) 42.0 - 52.0 %    .4 (H) 81.4 - 97.8 fL    MCH 31.3 27.0 - 33.0 pg    MCHC 31.1 (L) 33.7 - 35.3 g/dL    RDW 60.2 (H) 35.9 - 50.0 fL    Platelet Count 357 164 - 446 K/uL    MPV 9.5 9.0 - 12.9 fL   Comp Metabolic Panel    Collection Time: 01/20/21  5:20 AM   Result Value Ref Range    Sodium 135 135 - 145 mmol/L    Potassium 3.9 3.6 - 5.5 mmol/L    Chloride 95 (L) 96 - 112 mmol/L    Co2 28 20 - 33 mmol/L    Anion  Gap 12.0 7.0 - 16.0    Glucose 106 (H) 65 - 99 mg/dL    Bun 25 (H) 8 - 22 mg/dL    Creatinine 4.15 (H) 0.50 - 1.40 mg/dL    Calcium 8.6 8.5 - 10.5 mg/dL    AST(SGOT) 10 (L) 12 - 45 U/L    ALT(SGPT) 6 2 - 50 U/L    Alkaline Phosphatase 108 (H) 30 - 99 U/L    Total Bilirubin 0.3 0.1 - 1.5 mg/dL    Albumin 3.1 (L) 3.2 - 4.9 g/dL    Total Protein 5.9 (L) 6.0 - 8.2 g/dL    Globulin 2.8 1.9 - 3.5 g/dL    A-G Ratio 1.1 g/dL   PROCALCITONIN    Collection Time: 01/20/21  5:20 AM   Result Value Ref Range    Procalcitonin 1.57 (H) <0.25 ng/mL   ESTIMATED GFR    Collection Time: 01/20/21  5:20 AM   Result Value Ref Range    GFR If  17 (A) >60 mL/min/1.73 m 2    GFR If Non  14 (A) >60 mL/min/1.73 m 2   ACCU-CHEK GLUCOSE    Collection Time: 01/20/21  7:04 AM   Result Value Ref Range    Glucose - Accu-Ck 114 (H) 65 - 99 mg/dL   ACCU-CHEK GLUCOSE    Collection Time: 01/20/21 11:03 AM   Result Value Ref Range    Glucose - Accu-Ck 174 (H) 65 - 99 mg/dL       Current Facility-Administered Medications   Medication Frequency   • [START ON 1/21/2021] metoprolol (LOPRESSOR) tablet 12.5 mg DAILY   • insulin regular (HumuLIN R,NovoLIN R) injection 4X/DAY ACHS    And   • dextrose 50% (D50W) injection 50 mL Q15 MIN PRN   • insulin glargine (Lantus) injection QAM INSULIN   • tamsulosin (FLOMAX) capsule 0.8 mg QHS   • gabapentin (NEURONTIN) capsule 300 mg DAILY   • heparin injection 1,800 Units DIALYSIS PRN   • hydrOXYzine HCl (ATARAX) tablet 50 mg Q6HRS PRN   • melatonin tablet 3 mg HS PRN   • Respiratory Therapy Consult Continuous RT   • Pharmacy Consult Request ...Pain Management Review 1 Each PHARMACY TO DOSE   • acetaminophen (Tylenol) tablet 650 mg Q4HRS PRN   • lactulose 20 GM/30ML solution 30 mL QDAY PRN   • docusate sodium (ENEMEEZ) enema 283 mg QDAY PRN   • fleet enema 133 mL QDAY PRN   • artificial tears ophthalmic solution 1 Drop PRN   • benzocaine-menthol (CEPACOL) lozenge 1 Lozenge Q2HRS PRN   • mag  hydrox-al hydrox-simeth (MAALOX PLUS ES or MYLANTA DS) suspension 20 mL Q2HRS PRN   • ondansetron (ZOFRAN ODT) dispertab 4 mg 4X/DAY PRN    Or   • ondansetron (ZOFRAN) syringe/vial injection 4 mg 4X/DAY PRN   • traZODone (DESYREL) tablet 50 mg QHS PRN   • sodium chloride (OCEAN) 0.65 % nasal spray 2 Spray PRN   • traMADol (ULTRAM) 50 MG tablet 50 mg Q4HRS PRN   • atorvastatin (LIPITOR) tablet 40 mg Q EVENING   • calcitRIOL (ROCALTROL) capsule 0.25 mcg DAILY   • epoetin (Retacrit) injection (Dialysis use only) 10,000 Units TUE+THU+SAT   • methimazole (TAPAZOLE) tablet 5 mg Q EVENING   • methimazole (TAPAZOLE) tablet 7.5 mg QAM   • omeprazole (PRILOSEC) capsule 20 mg DAILY   • oxyCODONE immediate-release (ROXICODONE) tablet 5 mg Q4HRS PRN   • senna-docusate (PERICOLACE or SENOKOT S) 8.6-50 MG per tablet 2 Tab BID    And   • magnesium hydroxide (MILK OF MAGNESIA) suspension 30 mL QDAY PRN    And   • bisacodyl (DULCOLAX) suppository 10 mg QDAY PRN   • apixaban (ELIQUIS) tablet 2.5 mg BID       Orders Placed This Encounter   Procedures   • Diet Order Diet: Renal; Second Modifier: (optional): Consistent CHO (Diabetic)     Standing Status:   Standing     Number of Occurrences:   1     Order Specific Question:   Diet:     Answer:   Renal [8]     Order Specific Question:   Second Modifier: (optional)     Answer:   Consistent CHO (Diabetic) [4]       Assessment:  Active Hospital Problems    Diagnosis   • *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)   • Leukocytosis   • Urinary retention   • Paroxysmal A-fib (HCC)   • End stage renal disease on dialysis (HCC)   • Type 2 diabetes mellitus with kidney complication, with long-term current use of insulin (HCC)   • Essential hypertension   • Anemia   • Hyperthyroidism   • Dyslipidemia   • Hypokalemia   • Impaired mobility and ADLs     This patient is a 75 y.o. male admitted for acute inpatient rehabilitation with Status post below-knee amputation of right lower  extremity (HCC).    I led and attended the weekly conference, and agree with the IDT conference documentation and plan of care as noted below.    Date of conference: 1/18/2021    Goals and barriers: See IDT note.    Biggest barriers: urinary retention, amputation, high fall risk during transfers, generalized weakness, indwelling cath, rigidity in thinking, mild cognitive impairments with memory and attention, hyper-verbose    Goals in next week: discharge    CM/social support: ex-wife and son work during the day    Anticipated DC date: 1/29    Home health: PT/OT/SLP/RN    Equip: ramp, WC, FWW    Follow up: PCP, surgery, Dr. Elliott, urology, nephrology    Medical Decision Making and Plan:    Right BKA  12/31 Dr. Lynn  Continue full rehab program  PT/OT/SLP, 1 hr each discipline, 5 days per week     Outpatient follow-up for suture removal after discharge with surgery  Outpatient follow-up with Dr. Elliott, referral made    Phantom pain, improved/resolved  Continue gabapentin 300 mg, maximum due to renal failure  Outpatient follow-up with Dr. Elliott, referral made     Encephalopathy, resolved  Speech therapy for cognitive assessment  Currently only with mild memory deficits    ?Depression  Psychology consult, Dr Izaguirre     Urinary retention, continues  Has very high residuals  Continue intermittent caths for over 400 cc, patient refusing  Started Flomax 0.4 mg 1/14 --> 0.8 mg 1/16  PRN lidocaine jelly for caths  Placed indwelling catheter for bladder rest 1/17  Referral to urology made, suspect BPH as cause  Attempt another voiding trial on Monday 1/25    Bowel program  History of diabetic gastroparesis  Continue bowel medications  Scheduled Sennakot  PRN Miralax, MOM, bisacodyl suppository  Last BM 1/19    DVT prophylaxis  Eliquis     Appreciate the assistance of the hospitalist with his medical comorbidities:     End-stage renal disease on hemodialysis  Consulted nephrology  Continue calcitriol 0.25 mcg daily  Continue  Retacrit with dialysis  Continue K-Phos 250 mg 2 times daily     Diabetes with hyperglycemia  Sliding scale insulin  Lantus 5 units daily --> 8 units 1/19     Peripheral artery disease  Continue Eliquis, renal dosing  Continue statin     Coronary artery disease  With history of stents  Continue statin     Paroxysmal atrial fibrillation  Continue Eliquis, renal dosing  Continue metoprolol 37.5 mg twice daily --> dose decreased to 25 mg twice daily --> 12.5 mg BID 1/21     Hypertension  Hypotension  Continue metoprolol 37.5 mg twice daily --> dose decreased to 25 mg twice daily --> 12.5 mg BID 12/21     Hyperlipidemia  Continue statin     Hyperthyroidism  Continue methimazole 7.5 mg in the morning, 5 mg in the evening     GERD  Continue omeprazole 20 mg     Anemia  Likely of chronic renal disease  Continue Retacrit     Leukocytosis, resolved, then returned  Recently treated with IV antibiotics for pneumonia  Negative urinalysis and chest x-ray  Negative blood cultures    Elevated procalcitonin, improved  Recently treated with IV antibiotics for pneumonia    Penile discharge  Culture with yeast, status post Diflucan    COVID negative 1/11, re-screen 1/17 negative    Total time:  17 minutes.  I spent greater than 50% of the time for patient care, counseling, and coordination on this date, including patient face-to face time, unit/floor time with review of records/pertinent lab data and studies, as well as discussing diagnostic evaluation/work up, planned therapeutic interventions, and future disposition of care, as per the interval events/subjective and the assessment and plan as noted above.    I have performed a physical exam, reviewed and updated ROS, as well as the assessment and plan today 1/20/2021. In review of note from 1/19/21 there are no new changes except as documented above.          Lata Goodman M.D.   Physical Medicine and Rehabilitation

## 2021-01-20 NOTE — PROGRESS NOTES
"Hospital Medicine Daily Progress Note      Chief Complaint:  Hypertension  Afib  Diabetes  Leukocytosis  Penile wound    Interval History:  No more abdominal pain today.  But pt c/o feeling weak due to \"my low hemoglobin levels.\"    Review of Systems  Review of Systems   Constitutional: Negative for chills and fever.   HENT: Negative.    Eyes: Negative.    Respiratory: Negative for cough and shortness of breath.    Cardiovascular: Negative for chest pain and palpitations.   Gastrointestinal: Negative for abdominal pain, nausea and vomiting.   Musculoskeletal:        Wound pain    Skin: Negative for itching and rash.   Endo/Heme/Allergies: Negative for polydipsia. Does not bruise/bleed easily.        Physical Exam  Temp:  [36.3 °C (97.3 °F)-37.2 °C (99 °F)] 36.3 °C (97.3 °F)  Pulse:  [74-92] 74  Resp:  [18] 18  BP: ()/(40-68) 94/48  SpO2:  [92 %-95 %] 92 %    Physical Exam  Vitals signs reviewed.   Constitutional:       General: He is not in acute distress.     Appearance: Normal appearance. He is not ill-appearing.   HENT:      Head: Normocephalic and atraumatic.      Right Ear: External ear normal.      Left Ear: External ear normal.      Nose: Nose normal.      Mouth/Throat:      Pharynx: Oropharynx is clear.   Eyes:      General:         Right eye: No discharge.         Left eye: No discharge.      Extraocular Movements: Extraocular movements intact.      Conjunctiva/sclera: Conjunctivae normal.   Neck:      Musculoskeletal: Normal range of motion and neck supple.   Cardiovascular:      Rate and Rhythm: Normal rate and regular rhythm.   Pulmonary:      Effort: No respiratory distress.      Breath sounds: No wheezing.      Comments: Decreased BS   Abdominal:      General: Bowel sounds are normal. There is no distension.      Palpations: Abdomen is soft.      Tenderness: There is no abdominal tenderness.   Musculoskeletal:      Left lower leg: No edema.      Comments: Right BKA in stump protector   Skin:     " General: Skin is warm and dry.   Neurological:      Mental Status: He is alert and oriented to person, place, and time.         Fluids    Intake/Output Summary (Last 24 hours) at 1/20/2021 1028  Last data filed at 1/20/2021 0900  Gross per 24 hour   Intake 1700 ml   Output 2975 ml   Net -1275 ml       Laboratory  Recent Labs     01/19/21  0512 01/20/21  0520   WBC 16.5* 10.9*   RBC 2.39* 2.43*   HEMOGLOBIN 7.7* 7.6*   HEMATOCRIT 23.9* 24.4*   .0* 100.4*   MCH 32.2 31.3   MCHC 32.2* 31.1*   RDW 57.4* 60.2*   PLATELETCT 337 357   MPV 9.3 9.5     Recent Labs     01/19/21  0512 01/20/21  0520   SODIUM 132* 135   POTASSIUM 4.5 3.9   CHLORIDE 93* 95*   CO2 24 28   GLUCOSE 131* 106*   BUN 50* 25*   CREATININE 7.67* 4.15*   CALCIUM 8.7 8.6                 Assessment/Plan  * 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)- (present on admission)  Assessment & Plan  2/2 PVD and DM with non-healing wound and gangrene  S/P right BKA on 12/31/20 by Dr. Lynn  Wound care and pain control    Abrasion of penis with infection- (present on admission)  Assessment & Plan  Thought to be 2/2 Tomas trauma  Wound Cx +yeast  Completed Fluconazole    Paroxysmal A-fib (HCC)- (present on admission)  Assessment & Plan  Rate controlled on Metoprolol  Anticoagulated on Eliquis    End stage renal disease on dialysis (HCC)- (present on admission)  Assessment & Plan  On maintenance HD TTS per [facility]    Type 2 diabetes mellitus with kidney complication, with long-term current use of insulin ([facility])- (present on admission)  Assessment & Plan  HbA1c 5.2  Continue Lantus and SSI  Observe serum glucose trends   Outpt meds include Lantus 5 units qhs    Anemia- (present on admission)  Assessment & Plan  Has macrocytic indices  Fe, Vit B12, Folate, and TSH levels all normal  On Epoetin per Nephrology  FOB+, suggest GI F/U  Follow H/H    Essential hypertension- (present on admission)  Assessment & Plan  Decrease  Metoprolol further for hypotension  Monitor for orthostatic hypotension on Flomax    Hyperthyroidism- (present on admission)  Assessment & Plan  Euthyroid on Methimazole    Leukocytosis  Assessment & Plan  UA 5-10 WBC w/ negative bacteria, UCx pending  CXR improved w/ no new consolidation  BCx x 2 in progress  PCT elevated but downtrending compared w/ recent values  WBC improving  Will not start empiric abx at this time as pt non-toxic appearing, afebrile, and has no new source of infection    Abdominal pain  Assessment & Plan  KUB unremarkable    Dyslipidemia- (present on admission)  Assessment & Plan  On Lipitor    DNR

## 2021-01-20 NOTE — THERAPY
"Speech Language Pathology  Daily Treatment     Patient Name: Luis Sepulveda  Age:  75 y.o., Sex:  male  Medical Record #: [id number]  Today's Date: 1/20/2021     Precautions  Precautions: Fall Risk  Comments: R BKA, R IPOP, dialysis Tues, Thurs, Sat, L arm fistula    Subjective    Patient was seen for tx, seated upright in bed. Patient is noted with improved insight, stating \"I used to be able to do this stuff in my head. I know that some decline in mental flexibility is expected with age but this is not normal.\" Patient was very cooperative with all presented tasks.      Objective       01/20/21 0701   Precautions   Precautions Fall Risk   Cognition   Functional Math / Financial Management Minimal (4)   Medication Management  Supervision (5)   Sleep/Wake Cycle   Sleep & Rest Awake   SLP Total Time Spent   SLP Individual Total Time Spent (Mins) 60   Treatment Charges   SLP Cognitive Skill Development First 15 Minutes 1   SLP Cognitive Skill Development Additional 15 Minutes 3       Assessment    SLP provided patient with mock pill organizer worksheets requiring him to locate the errors. Patient independently identified the errors with 100% accuracy fr a field of up to 8 pills per trial.     SLP also provided patient with tasks to target simple money math. Patient required minimal cues to use a piece of scratch paper but completed with 80% accuracy independently. He was also able to self-correct to 100% accuracy when SLP highlighted the incorrect answers. Patient was noted to use the correct equations for each item but errors were noted in calculations.     Strengths: Able to follow instructions, Independent prior level of function  Barriers: Impulsive, Impaired functional cognition, Impaired insight/denial of deficits    Plan    Cont tx to target attn, memory and problem solving.     Speech Therapy Problems     Problem: Memory STGs     Dates: Start: 01/14/21       Goal: STG-Within one week, patient will     " Dates: Start: 01/14/21       Description: 1) Individualized goal:  complete verbal recall tasks with brief (5-10 minute) delay with 70% accuracy provided MIN cues.   2) Interventions:  SLP Self Care / ADL Training , SLP Cognitive Skill Development, and SLP Group Treatment                Problem: Problem Solving STGs     Dates: Start: 01/14/21       Goal: STG-Within one week, patient will     Dates: Start: 01/14/21       Description: 1) Individualized goal:  complete complex attention tasks involving medication management with 80% accuracy provided SPV.   2) Interventions:  SLP Self Care / ADL Training , SLP Cognitive Skill Development, and SLP Group Treatment                Problem: Speech/Swallowing LTGs     Dates: Start: 01/14/21       Goal: LTG-By discharge, patient will     Dates: Start: 01/14/21       Description: 1) Individualized goal:  complete functional problem solving and recall information with 80% accuracy and MOD I.   2) Interventions:  SLP Self Care / ADL Training , SLP Cognitive Skill Development, and SLP Group Treatment

## 2021-01-20 NOTE — PROGRESS NOTES
[facility] Progress Note    Hemodialysis treatment ordered today per Dr. Hunter x 3.5 hours.     Patient tolerated tx; see paper flow sheet for details.     Net UF 1,700 mL.     Needles removed from access site. Dressings applied and sites held x 10 minutes; verified no bleeding. Positive bruit/thrill post tx.   Staff RN to monitor AVF/G for breakthrough bleeding. Should breakthrough bleeding occur, staff RN to apply pressure to access sites until bleeding resolved.   Notify Dialysis and Nephrologist for follow-up.    Report given to Primary RN.

## 2021-01-20 NOTE — THERAPY
"Occupational Therapy  Daily Treatment     Patient Name: Luis Sepulveda  Age:  75 y.o., Sex:  male  Medical Record #: [id number]  Today's Date: 1/20/2021     Precautions  Precautions: (P) Fall Risk, Non Weight Bearing Right Lower Extremity  Comments: (P) R BKA, R IPOP, dialysis Tues, Thurs, Sat, L arm fistula    Safety   ADL Safety : (P) Requires Supervision for Safety  Bathroom Safety: (P) Requires Supervision for Safety  Comments: (P) See below notes for ADL performance details.    Subjective    \"I really need a shower\"     Objective       01/20/21 0831   Precautions   Precautions Fall Risk;Non Weight Bearing Right Lower Extremity   Comments R BKA, R IPOP, dialysis Tues, Thurs, Sat, L arm fistula   Safety    ADL Safety  Requires Supervision for Safety   Bathroom Safety Requires Supervision for Safety   Comments See below notes for ADL performance details.   Functional Level of Assist   Eating Independent   Grooming Independent;Seated   Grooming Description Seated in wheelchair at sink   Bathing Modified Independent  (pt able to waterproff RLE)   Bathing Description Tub bench;Grab bar;Hand held shower;Increased time  (pt completed all steps seated on tub bench)   Upper Body Dressing Independent   Lower Body Dressing Moderate Assist   Lower Body Dressing Description Assist with threading into pant leg;Increased time;Set-up of equipment;Supervision for safety;Verbal cueing;Reacher  (assist to thread palacios into underwear/pants, use of reacher)   Toileting Stand by Assist   Toileting Description Grab bar;Increased time;Supervision for safety   Toilet Transfers Contact Guard Assist   Toilet Transfer Description Grab bar;Set-up of equipment;Supervision for safety;Verbal cueing  (w/c<>toilet SPT with GB)   Tub / Shower Transfers Contact Guard Assist   Tub Shower Transfer Description Grab bar;Shower bench;Increased time;Set-up of equipment;Supervision for safety;Verbal cueing  (w/c<>shower bench SPT with GB)   Bed " Mobility    Supine to Sit Stand by Assist   Scooting Stand by Assist   Rolling Supervised   Skilled Intervention Verbal Cuing   Interdisciplinary Plan of Care Collaboration   Patient Position at End of Therapy Seated;Chair Alarm On;Self Releasing Lap Belt Applied;Call Light within Reach;Tray Table within Reach;Phone within Reach   OT Total Time Spent   OT Individual Total Time Spent (Mins) 60   OT Charge Group   OT Self Care / ADL 4       Assessment    Pt tolerated OT session well with focus on progression with ADLs and bathroom transfers. Pt demonstrated inc independence with bathroom transfers, but con't to require CGA-Close SBA for safety. Pt consistently uses GB and improving with set up of w/c prior to txfr. Pt required cue to lock w/c brakes when standing to pull up his pants. He demonstrated good safety during shower activity, completed all steps seated on tub bench, lateral w/s to wash buttocks. Pt con't to require increased assistance with LB dressing 2/2 palacios catheter.      Strengths: Alert and oriented, Effective communication skills, Independent prior level of function, Making steady progress towards goals, Pleasant and cooperative, Supportive family, Willingly participates in therapeutic activities  Barriers: Decreased endurance, Fatigue, Impaired balance(safety awareness)    Plan    ADLs, IADLs and related functional mobility, Threshold shower transfers, BUE/Core strength, standing mike/balance.     Occupational Therapy Goals     Problem: Dressing     Dates: Start: 01/14/21       Goal: STG-Within one week, patient will dress LB     Dates: Start: 01/14/21       Description: 1) Individualized Goal:  with Min A and AE as needed  2) Interventions:  OT Group Therapy, OT Self Care/ADL, OT Cognitive Skill Dev, OT Community Reintegration, OT Manual Ther Technique, OT Neuro Re-Ed/Balance, OT Therapeutic Activity, OT Evaluation, and OT Therapeutic Exercise          Note:     Goal Note filed on 01/18/21 1149 by  Romy Collins, OT    Requires max A.                         Problem: Functional Transfers     Dates: Start: 01/14/21       Goal: STG-Within one week, patient will transfer to toilet     Dates: Start: 01/14/21       Description: 1) Individualized Goal:  with CGA and AE as needed  2) Interventions:  OT Group Therapy, OT Self Care/ADL, OT Cognitive Skill Dev, OT Community Reintegration, OT Manual Ther Technique, OT Neuro Re-Ed/Balance, OT Therapeutic Activity, OT Evaluation, and OT Therapeutic Exercise       Note:     Goal Note filed on 01/18/21 1149 by Romy Collins, OT    Min A to sba.                   Goal: STG-Within one week, patient will transfer to tub/shower     Dates: Start: 01/14/21       Description: 1) Individualized Goal:  with CGA and AE as needed  2) Interventions:  OT Group Therapy, OT Self Care/ADL, OT Cognitive Skill Dev, OT Community Reintegration, OT Manual Ther Technique, OT Neuro Re-Ed/Balance, OT Therapeutic Activity, OT Evaluation, and OT Therapeutic Exercise    Note:     Goal Note filed on 01/18/21 1149 by Romy Collins, OT    To be addressed.                         Problem: OT Long Term Goals     Dates: Start: 01/14/21       Goal: LTG-By discharge, patient will complete basic self care tasks     Dates: Start: 01/14/21       Description: 1) Individualized Goal:  with mod I and AE as needed  2) Interventions:  OT Group Therapy, OT Self Care/ADL, OT Cognitive Skill Dev, OT Community Reintegration, OT Manual Ther Technique, OT Neuro Re-Ed/Balance, OT Therapeutic Activity, OT Evaluation, and OT Therapeutic Exercise      Note:     Goal Note filed on 01/14/21 1519 by Zeina Taylor MS,OTR/L    1) Individualized Goal:  with mod I and AE as needed  2) Interventions:  OT Group Therapy, OT Self Care/ADL, OT Cognitive Skill Dev, OT Community Reintegration, OT Manual Ther Technique, OT Neuro Re-Ed/Balance, OT Therapeutic Activity, OT Evaluation, and OT Therapeutic Exercise                    Goal: LTG-By discharge, patient will perform bathroom transfers     Dates: Start: 01/14/21       Description: 1) Individualized Goal:  with mod I and AE as needed  2) Interventions:  OT Group Therapy, OT Self Care/ADL, OT Cognitive Skill Dev, OT Community Reintegration, OT Manual Ther Technique, OT Neuro Re-Ed/Balance, OT Therapeutic Activity, OT Evaluation, and OT Therapeutic Exercise       Note:     Goal Note filed on 01/14/21 8629 by Zeina Taylor MS,OTR/L    1) Individualized Goal:  with mod I and AE as needed  2) Interventions:  OT Group Therapy, OT Self Care/ADL, OT Cognitive Skill Dev, OT Community Reintegration, OT Manual Ther Technique, OT Neuro Re-Ed/Balance, OT Therapeutic Activity, OT Evaluation, and OT Therapeutic Exercise

## 2021-01-20 NOTE — THERAPY
"Physical Therapy   Daily Treatment     Patient Name: Luis Sepulveda  Age:  75 y.o., Sex:  male  Medical Record #: [id number]  Today's Date: 1/20/2021     Precautions  Precautions: Fall Risk, Non Weight Bearing Right Lower Extremity  Comments: R BKA, R IPOP, dialysis Tues, Thurs, Sat, L arm fistula    Subjective    tx completed at 1000am    Pt was willing to participate, reports \"I think my oxygen is low, but it will read 100%, it is because of my low hemoglobin\"     Objective       01/20/21 1100   Gait Functional Level of Assist    Gait Level Of Assist Minimal Assist   Assistive Device Parallel Bars   Distance (Feet) 10   # of Times Distance was Traveled 4   Deviation Step To  (IPOP)   Sitting Lower Body Exercises   Bilateral Row 3 sets of 10;Weight (See Comments for lbs)  (#15)   Lat Pull 3 sets of 10;Bilateral;Light Resistance Theraband  (#15)   Bed Mobility    Sit to Stand Minimal Assist   Interdisciplinary Plan of Care Collaboration   IDT Collaboration with  Occupational Therapist   Patient Position at End of Therapy Seated;Self Releasing Lap Belt Applied   Collaboration Comments CLOF   PT Total Time Spent   PT Individual Total Time Spent (Mins) 30   PT Charge Group   PT Gait Training 1   PT Therapeutic Exercise 1   Supervising Physical Therapist Crow Mcadams       Assessment    amb and standing tolerance limited by L knee weakness. 1 minor L knee buckle that patient self corrected in the //. Pt pleasant and cooperative. 02 sats 100%.  Strengths: Independent prior level of function, Making steady progress towards goals, Willingly participates in therapeutic activities  Barriers: Fatigue, Generalized weakness, Impaired activity tolerance, Impaired balance(Impaired endurance, fall risk)    Plan    Exercise BLE - need to build safer strength on LLE, progress BKA edu, go over wrapping strategy, STS FWW, transfer safety, ambulation with close wc follow, go over needs for DC - wc/ramp? SPV, home " accessibility?    Physical Therapy Problems     Problem: Mobility     Dates: Start: 01/14/21       Goal: STG-Within one week, patient will propel wheelchair community     Dates: Start: 01/14/21       Description: 1) Individualized goal:  150 ft At mod I in order to improve activity tolerance propelling himself around facility  2) Interventions:  PT Gait Training, PT Therapeutic Exercises, PT Neuro Re-Ed/Balance, PT Therapeutic Activity, PT Manual Therapy, and PT Evaluation                  Problem: PT-Long Term Goals     Dates: Start: 01/14/21       Goal: LTG-By discharge, patient will propel wheelchair     Dates: Start: 01/14/21       Description: 1) Individualized goal:  150 ft At mod I indoor and outdoor in order to improve activity tolerance propelling himself around facility  2) Interventions:  PT Gait Training, PT Therapeutic Exercises, PT Neuro Re-Ed/Balance, PT Therapeutic Activity, PT Manual Therapy, and PT Evaluation          Goal: LTG-By discharge, patient will transfer one surface to another     Dates: Start: 01/14/21       Description: 1) Individualized goal:  At mod I with LRD In order to maximize self mobility  2) Interventions:  PT Gait Training, PT Therapeutic Exercises, PT Neuro Re-Ed/Balance, PT Therapeutic Activity, PT Manual Therapy, and PT Evaluation          Goal: LTG-By discharge, patient will transfer in/out of a car     Dates: Start: 01/14/21       Description: 1) Individualized goal:  At SPV with LRD In order to maximize self mobility  2) Interventions:  PT Gait Training, PT Therapeutic Exercises, PT Neuro Re-Ed/Balance, PT Therapeutic Activity, PT Manual Therapy, and PT Evaluation

## 2021-01-20 NOTE — PROGRESS NOTES
Patient care assumed. Report received from [facility] YESICA Brown. Patient is alert and calm, resting in bed. Call light and bedside table within reach. Will continue to monitor.

## 2021-01-21 NOTE — CARE PLAN
Problem: Communication  Goal: The ability to communicate needs accurately and effectively will improve  Outcome: PROGRESSING AS EXPECTED     Problem: Safety  Goal: Will remain free from injury  Outcome: PROGRESSING AS EXPECTED     Problem: Bowel/Gastric:  Goal: Normal bowel function is maintained or improved  Outcome: PROGRESSING AS EXPECTED  Note: Patient is continent of bowel. Had BM today. Occult blood x1 collected.

## 2021-01-21 NOTE — PROGRESS NOTES
Hospital Medicine Daily Progress Note      Chief Complaint:  Hypertension  Afib  Diabetes  Leukocytosis  Penile wound    Interval History:  No new complaints today.    Review of Systems  Review of Systems   Constitutional: Negative for chills and fever.   HENT: Negative.    Eyes: Negative.    Respiratory: Negative for cough and shortness of breath.    Cardiovascular: Negative for chest pain and palpitations.   Gastrointestinal: Negative for abdominal pain, nausea and vomiting.   Musculoskeletal:        Wound pain    Skin: Negative for itching and rash.   Endo/Heme/Allergies: Negative for polydipsia. Does not bruise/bleed easily.        Physical Exam  Temp:  [36.7 °C (98 °F)-37.1 °C (98.8 °F)] 37.1 °C (98.8 °F)  Pulse:  [] 83  Resp:  [16-18] 18  BP: ()/(56-65) 90/56  SpO2:  [90 %-98 %] 98 %    Physical Exam  Vitals signs reviewed.   Constitutional:       General: He is not in acute distress.     Appearance: Normal appearance. He is not ill-appearing.   HENT:      Head: Normocephalic and atraumatic.      Right Ear: External ear normal.      Left Ear: External ear normal.      Nose: Nose normal.      Mouth/Throat:      Pharynx: Oropharynx is clear.   Eyes:      General:         Right eye: No discharge.         Left eye: No discharge.      Extraocular Movements: Extraocular movements intact.      Conjunctiva/sclera: Conjunctivae normal.   Neck:      Musculoskeletal: Normal range of motion and neck supple.   Cardiovascular:      Rate and Rhythm: Normal rate and regular rhythm.   Pulmonary:      Effort: No respiratory distress.      Breath sounds: No wheezing.      Comments: Decreased BS   Abdominal:      General: Bowel sounds are normal. There is no distension.      Palpations: Abdomen is soft.      Tenderness: There is no abdominal tenderness.   Musculoskeletal:      Left lower leg: No edema.      Comments: Right BKA in stump protector   Skin:     General: Skin is warm and dry.   Neurological:      Mental  Status: He is alert and oriented to person, place, and time.         Fluids    Intake/Output Summary (Last 24 hours) at 1/21/2021 1506  Last data filed at 1/21/2021 1200  Gross per 24 hour   Intake 720 ml   Output 600 ml   Net 120 ml       Laboratory  Recent Labs     01/19/21  0512 01/20/21  0520   WBC 16.5* 10.9*   RBC 2.39* 2.43*   HEMOGLOBIN 7.7* 7.6*   HEMATOCRIT 23.9* 24.4*   .0* 100.4*   MCH 32.2 31.3   MCHC 32.2* 31.1*   RDW 57.4* 60.2*   PLATELETCT 337 357   MPV 9.3 9.5     Recent Labs     01/19/21  0512 01/20/21  0520   SODIUM 132* 135   POTASSIUM 4.5 3.9   CHLORIDE 93* 95*   CO2 24 28   GLUCOSE 131* 106*   BUN 50* 25*   CREATININE 7.67* 4.15*   CALCIUM 8.7 8.6                 Assessment/Plan  * 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)- (present on admission)  Assessment & Plan  2/2 PVD and DM with non-healing wound and gangrene  S/P right BKA on 12/31/20 by Dr. Lynn  Wound care and pain control    Abrasion of penis with infection- (present on admission)  Assessment & Plan  Thought to be 2/2 Tomas trauma  Wound Cx +yeast  Completed Fluconazole    Paroxysmal A-fib (HCC)- (present on admission)  Assessment & Plan  Rate controlled on Metoprolol  Anticoagulated on Eliquis    End stage renal disease on dialysis ([facility])- (present on admission)  Assessment & Plan  On maintenance HD TTS per [facility]    Type 2 diabetes mellitus with kidney complication, with long-term current use of insulin ([facility])- (present on admission)  Assessment & Plan  HbA1c 5.2  Continue Lantus and SSI  Observe serum glucose trends   Outpt meds include Lantus 5 units qhs    Anemia- (present on admission)  Assessment & Plan  Has macrocytic indices  Fe, Vit B12, Folate, and TSH levels all normal  On Epoetin per Nephrology  FOB+, suggest GI F/U  Follow H/H    Essential hypertension- (present on admission)  Assessment & Plan  Decreased Metoprolol further for hypotension  Monitor for orthostatic  hypotension on Flomax, consider decreasing dose    Hyperthyroidism- (present on admission)  Assessment & Plan  Euthyroid on Methimazole    Leukocytosis  Assessment & Plan  UA 5-10 WBC w/ negative bacteria, UCx negative  CXR improved w/ no new consolidation  BCx x 2 NGTD  PCT elevated but downtrending compared w/ recent values  WBC improving w/o intervention    Abdominal pain  Assessment & Plan  KUB unremarkable    Dyslipidemia- (present on admission)  Assessment & Plan  On Lipitor    DNR

## 2021-01-21 NOTE — THERAPY
Physical Therapy   Daily Treatment     Patient Name: Luis Sepulveda  Age:  75 y.o., Sex:  male  Medical Record #: [id number]  Today's Date: 1/21/2021     Precautions  Precautions: Fall Risk, Non Weight Bearing Right Lower Extremity  Comments: R BKA, R IPOP, dialysis Tues, Thurs, Sat, L arm fistula, skin breakdown L heel    Subjective    Pt reports he is agreeable to wc and transfer training      Objective       01/21/21 0931   Wheelchair Functional Level of Assist   Wheelchair Assist Supervised   Distance Wheelchair (Feet or Distance) 150  (also 125 outside min A, unbale to finish uphill climb)   Wheelchair Description Adaptive equipment;Extra time;Safety concerns;Verbal cueing  (indoors - SPV, outdoors - min A for safety with cues)   Transfer Functional Level of Assist   Bed, Chair, Wheelchair Transfer Minimal Assist   Bed Chair Wheelchair Transfer Description Adaptive equipment;Increased time;Verbal cueing   Bed Mobility    Sit to Stand Contact Guard Assist  (repeated practice cues neeed for sequencing and safety)   Interdisciplinary Plan of Care Collaboration   Patient Position at End of Therapy Seated;Call Light within Reach;Tray Table within Reach;Self Releasing Lap Belt Applied;Chair Alarm On   PT Total Time Spent   PT Individual Total Time Spent (Mins) 60   PT Charge Group   PT Therapeutic Activities 4     Talk DC - home setup , family support. All family members work full time - likely will need to be mod I to DC successfully to home.     Assessment    Pt demos cont need for min A for transfers dt poor strength on LLE. Pt also should be avoiding excessive time spent in stance on LLE dt skin breakdown on heel. Pt will likely need to be mod I in order to DC home unless something changes with family support.     Strengths: Independent prior level of function, Making steady progress towards goals, Willingly participates in therapeutic activities  Barriers: Fatigue, Generalized weakness, Impaired activity  tolerance, Impaired balance(Impaired endurance, fall risk)    Plan    Exercise BLE - need to build safer strength on LLE, progress BKA edu, go over wrapping strategy, STS FWW, transfer safety, ambulation with close wc follow,     Physical Therapy Problems     Problem: Mobility     Dates: Start: 01/14/21       Goal: STG-Within one week, patient will propel wheelchair community     Dates: Start: 01/14/21       Description: 1) Individualized goal:  150 ft At mod I in order to improve activity tolerance propelling himself around facility  2) Interventions:  PT Gait Training, PT Therapeutic Exercises, PT Neuro Re-Ed/Balance, PT Therapeutic Activity, PT Manual Therapy, and PT Evaluation                  Problem: PT-Long Term Goals     Dates: Start: 01/14/21       Goal: LTG-By discharge, patient will propel wheelchair     Dates: Start: 01/14/21       Description: 1) Individualized goal:  150 ft At mod I indoor and outdoor in order to improve activity tolerance propelling himself around facility  2) Interventions:  PT Gait Training, PT Therapeutic Exercises, PT Neuro Re-Ed/Balance, PT Therapeutic Activity, PT Manual Therapy, and PT Evaluation          Goal: LTG-By discharge, patient will transfer one surface to another     Dates: Start: 01/14/21       Description: 1) Individualized goal:  At mod I with LRD In order to maximize self mobility  2) Interventions:  PT Gait Training, PT Therapeutic Exercises, PT Neuro Re-Ed/Balance, PT Therapeutic Activity, PT Manual Therapy, and PT Evaluation          Goal: LTG-By discharge, patient will transfer in/out of a car     Dates: Start: 01/14/21       Description: 1) Individualized goal:  At V with LRD In order to maximize self mobility  2) Interventions:  PT Gait Training, PT Therapeutic Exercises, PT Neuro Re-Ed/Balance, PT Therapeutic Activity, PT Manual Therapy, and PT Evaluation

## 2021-01-21 NOTE — PROGRESS NOTES
"Nephrology Daily Progress Note    Author: Hui ECKERT  Collaborating Physician: Mario Hunter MD  Date of Service  1/21/2021    Chief Complaint  A 75-year-old male with end-stage renal disease   on chronic hemodialysis Tuesdays, Thursdays and Saturdays.  The patient was   admitted on 12/31 for right below-the-knee amputation.  He had wound and   gangrene of the right foot.  He has had some peripheral vascular procedures   previously.  He did have a right ray amputation in 11/2020.  He has had a   nonhealing wound and he was admitted for right below-the-knee amputation that   was performed on 12/31.  We have been consulted to assist in his dialysis   needs.  He had dialysis on 12/31 prior to admission.  Today, he is feeling   dyspneic.  He has no cough or sputum production.    Daily Nephrology Summary   1/13- Transferred to Rehab  1/14 - Pt sitting up in W/C in room, denies any complaints/concerns, due for HD today, BP labile, K+ 3.1, per chart review-penile discharge cx sent  1/15 - Pt getting up to restroom to shower with assistance, HD yesterday with 2.1L UF, K+ corrected to 3.6, iron stores replete, no growth on penile wound cx, VSS on RA   1/16 - No overnight events, HD later today.  No labs for review.  Sitting up at bedside eating lunch.  No complaints.   1/18 - No new events. Pt with palaicos. C/O GI issues, bowels are \"loose\". No new events.   1/19 - HD today. VSS. No new events.Pt seen during therapy. C/O about palacios catheter.  1/20 - HD yest, UF 1.7L. Occult blood stool +. CXR improved. Abd xray no obstruction. Abd pain improved after 2 large BMs. C/O palacios catheter.  1/21 - Voiding trial on Mon. Due for HD today. Sleepy today 2/2 sleeping pill last hs, slept very well.    Review of Systems  Review of Systems   Constitutional: Positive for malaise/fatigue. Negative for chills and fever.   HENT: Negative.    Eyes: Negative.    Respiratory: Negative.    Cardiovascular: Negative.    Gastrointestinal: " "Negative for nausea and vomiting.        Nonspecific, stomach \"is off\" since restarting solid food but is feeling better today after 2 lg bms    Genitourinary:        Tomas catheter   Musculoskeletal: Positive for joint pain. Negative for back pain and myalgias.        C/O RLE phantom pain   Skin: Negative.    Neurological: Negative.    Endo/Heme/Allergies: Negative.    Psychiatric/Behavioral: Negative.       Physical Exam  Temp:  [36.4 °C (97.5 °F)-36.7 °C (98.1 °F)] 36.7 °C (98 °F)  Pulse:  [] 112  Resp:  [16-20] 18  BP: ()/(50-65) 114/58  SpO2:  [90 %-94 %] 90 %    Physical Exam  Constitutional:       Appearance: Normal appearance.   HENT:      Head: Normocephalic and atraumatic.      Nose: Nose normal.      Mouth/Throat:      Mouth: Mucous membranes are moist.      Pharynx: Oropharynx is clear.   Eyes:      Extraocular Movements: Extraocular movements intact.      Pupils: Pupils are equal, round, and reactive to light.   Neck:      Musculoskeletal: Normal range of motion and neck supple.   Cardiovascular:      Rate and Rhythm: Normal rate and regular rhythm.      Pulses: Normal pulses.      Comments: L AVF +T/B  Pulmonary:      Effort: Pulmonary effort is normal.      Breath sounds: Normal breath sounds.   Abdominal:      General: Bowel sounds are normal.      Palpations: Abdomen is soft.   Musculoskeletal:         General: No deformity.      Comments: R BKA    Skin:     General: Skin is warm and dry.   Neurological:      General: No focal deficit present.      Mental Status: He is alert and oriented to person, place, and time.   Psychiatric:         Mood and Affect: Mood normal.         Behavior: Behavior normal.         Fluids    Intake/Output Summary (Last 24 hours) at 1/21/2021 0852  Last data filed at 1/21/2021 0800  Gross per 24 hour   Intake 1080 ml   Output 900 ml   Net 180 ml       Laboratory  Recent Labs     01/19/21  0512 01/20/21  0520   WBC 16.5* 10.9*   RBC 2.39* 2.43*   HEMOGLOBIN 7.7* " 7.6*   HEMATOCRIT 23.9* 24.4*   .0* 100.4*   MCH 32.2 31.3   MCHC 32.2* 31.1*   RDW 57.4* 60.2*   PLATELETCT 337 357   MPV 9.3 9.5     Recent Labs     01/19/21  0512 01/20/21  0520   SODIUM 132* 135   POTASSIUM 4.5 3.9   CHLORIDE 93* 95*   CO2 24 28   GLUCOSE 131* 106*   BUN 50* 25*   CREATININE 7.67* 4.15*   CALCIUM 8.7 8.6         No results for input(s): NTPROBNP in the last 72 hours.        Imaging  Available labs, imaging and clinical documentation reviewed.     Assessment/Plan  #  End-stage renal disease, on chronic hemodialysis qTTS  - Normally dialyzes at [facility] via L AVF   #  Status post right BKA on 12/31/2020 for diabetic ulcer.  #  Hypertension, fair control.  - On metoprolol   #  Anemia: superimposed blood loss in addition to chronic kidney disease.  - Hgb below target   - On ARMANDO   - Iron replete, ferritin elevated   #  Diabetes mellitus.  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  - On calcitriol and calcium acetate   - Calcium acetate currently being held   - Vit D 48   - Phos 4.1  - iPTH 109   #  Peripheral vascular disease.   #  Paroxysmal atrial fibrillation, on Eliquis and metoprolol.   #  Coronary artery disease, status post stents.  Asymptomatic.  #  History of hyperthyroidism. On methimazole.   #  Dyslipidemia, on statin therapy.   #  Past history of heavy tobacco use.  #  Pneumonia: s/p IV Zosyn   #  Encephalopathy: resolving  #  Weakness/debility  #  Urinary retention requiring catheter placement  - Chronic oliguria  #  Hypokalemia, corrected  #  Hyponatremia in ESRD, mild, improved  #  Penile meatus wound  - Tomas  - NGTD on cx      PLAN:  -HD today (Thurs)  -Continue hemodialysis qTTS/PRN  -UF as tolerated  -ARMANDO TIW with HD, goal Hgb 10-11   -Transfuse PRN Hgb <7  -Continue home medications   -Goal BP <140/90  -No dietary protein restrictions  -Dose meds for ESRD  -Limit narcotics/sedating meds  -PT/OT/Speech  -Pt will likely need urology consult for urinary retention  -Voiding  trial on Monday    Thank you,

## 2021-01-21 NOTE — PROGRESS NOTES
"Rehab Progress Note     Encounter Date: 1/21/2021    CC: I want the Tomas catheter out.    Interval Events (Subjective)  Vital signs stable.  Occasional low systolic blood pressure with min 92.  Last labs drawn were yesterday 1/20.  Sodium back to within normal limits up from 132 to 135.  BUN/creatinine reduced after dialysis.  Mildly elevated white count at 10.9 down from 16.52 days ago.  Stable H/H, anemic.  No new imaging to review.  Last chest x-ray 1/19/2021 showing slight interval improvement in left basilar opacity.  No new consolidations.  Low urine output given on dialysis.  Last bowel movement 1/20.    Patient seen and examined in his room while sitting up eating lunch.  Patient states that he really does not like the Tomas and would like to have it out.  We discussed extensively that if he has the Tomas removed he would need to have intermittent catheterization if he has not voiding.  Patient wishes to keep the Tomas catheter in for now and proceed with repeat voiding trial on Monday.  Patient states that he took as needed melatonin and trazodone last night and he is still little groggy this morning but otherwise he is doing well.  Denies any significant pain, does endorse discomfort from the catheter, no fevers/chills, no shortness of breath, no chest pain.    14 point ROS reviewed and negative except as stated above.     Objective:  VITAL SIGNS: /58   Pulse (!) 112   Temp 36.7 °C (98 °F) (Oral)   Resp 18   Ht 1.651 m (5' 5\")   Wt 76.5 kg (168 lb 10.4 oz)   SpO2 90%   BMI 28.07 kg/m²     GEN: No apparent distress, sitting comfortably in manual wheelchair.  HEENT: Head normocephalic, atraumatic.  Sclera nonicteric bilaterally, no ocular discharge appreciated bilaterally.  CV: Extremities warm and well-perfused, no peripheral edema appreciated bilaterally.  PULMONARY: Breathing nonlabored on room air, no respiratory accessory muscle use.  Not requiring supplemental oxygen.  ABD: Soft, " nontender.  : Garzon catheter present draining clear translucent urine.  SKIN: No appreciable skin breakdown on exposed areas of skin.  MSK: Right amputation appreciated.  NEURO: Awake alert.  Conversational.  Logical thought content.  PSYCH: Mood and affect within normal limits.    Recent Results (from the past 72 hour(s))   ACCU-CHEK GLUCOSE    Collection Time: 01/18/21 11:04 AM   Result Value Ref Range    Glucose - Accu-Ck 179 (H) 65 - 99 mg/dL   URINALYSIS    Collection Time: 01/18/21  4:40 PM    Specimen: Urine, Cath   Result Value Ref Range    Color Yellow     Character Clear     Specific Gravity 1.014 <1.035    Ph 7.0 5.0 - 8.0    Glucose 500 (A) Negative mg/dL    Ketones Negative Negative mg/dL    Protein 100 (A) Negative mg/dL    Bilirubin Negative Negative    Urobilinogen, Urine 0.2 Negative    Nitrite Negative Negative    Leukocyte Esterase Trace (A) Negative    Occult Blood Small (A) Negative    Micro Urine Req Microscopic    URINE MICROSCOPIC (W/UA)    Collection Time: 01/18/21  4:40 PM   Result Value Ref Range    WBC 5-10 (A) /hpf    RBC 5-10 (A) /hpf    Bacteria Negative None /hpf    Epithelial Cells Negative /hpf    Hyaline Cast 3-5 (A) /lpf   URINE CULTURE-EXISTING-LESS THAN 48 HOURS    Collection Time: 01/18/21  4:40 PM    Specimen: Urine, Garzon Cath   Result Value Ref Range    Significant Indicator NEG     Source UR     Site URINE, GARZON CATH     Culture Result No growth at 48 hours.    ACCU-CHEK GLUCOSE    Collection Time: 01/18/21  5:41 PM   Result Value Ref Range    Glucose - Accu-Ck 162 (H) 65 - 99 mg/dL   ACCU-CHEK GLUCOSE    Collection Time: 01/18/21  8:15 PM   Result Value Ref Range    Glucose - Accu-Ck 162 (H) 65 - 99 mg/dL   CBC WITH DIFFERENTIAL    Collection Time: 01/19/21  5:12 AM   Result Value Ref Range    WBC 16.5 (H) 4.8 - 10.8 K/uL    RBC 2.39 (L) 4.70 - 6.10 M/uL    Hemoglobin 7.7 (L) 14.0 - 18.0 g/dL    Hematocrit 23.9 (L) 42.0 - 52.0 %    .0 (H) 81.4 - 97.8 fL    MCH  32.2 27.0 - 33.0 pg    MCHC 32.2 (L) 33.7 - 35.3 g/dL    RDW 57.4 (H) 35.9 - 50.0 fL    Platelet Count 337 164 - 446 K/uL    MPV 9.3 9.0 - 12.9 fL    Neutrophils-Polys 77.60 (H) 44.00 - 72.00 %    Lymphocytes 10.50 (L) 22.00 - 41.00 %    Monocytes 10.40 0.00 - 13.40 %    Eosinophils 0.10 0.00 - 6.90 %    Basophils 0.30 0.00 - 1.80 %    Immature Granulocytes 1.10 (H) 0.00 - 0.90 %    Nucleated RBC 0.00 /100 WBC    Neutrophils (Absolute) 12.81 (H) 1.82 - 7.42 K/uL    Lymphs (Absolute) 1.74 1.00 - 4.80 K/uL    Monos (Absolute) 1.71 (H) 0.00 - 0.85 K/uL    Eos (Absolute) 0.01 0.00 - 0.51 K/uL    Baso (Absolute) 0.05 0.00 - 0.12 K/uL    Immature Granulocytes (abs) 0.18 (H) 0.00 - 0.11 K/uL    NRBC (Absolute) 0.00 K/uL   Renal Function Panel    Collection Time: 01/19/21  5:12 AM   Result Value Ref Range    Sodium 132 (L) 135 - 145 mmol/L    Potassium 4.5 3.6 - 5.5 mmol/L    Chloride 93 (L) 96 - 112 mmol/L    Co2 24 20 - 33 mmol/L    Glucose 131 (H) 65 - 99 mg/dL    Creatinine 7.67 (HH) 0.50 - 1.40 mg/dL    Bun 50 (H) 8 - 22 mg/dL    Calcium 8.7 8.5 - 10.5 mg/dL    Phosphorus 4.1 2.5 - 4.5 mg/dL    Albumin 3.1 (L) 3.2 - 4.9 g/dL   ESTIMATED GFR    Collection Time: 01/19/21  5:12 AM   Result Value Ref Range    GFR If  8 (A) >60 mL/min/1.73 m 2    GFR If Non African American 7 (A) >60 mL/min/1.73 m 2   ACCU-CHEK GLUCOSE    Collection Time: 01/19/21  7:06 AM   Result Value Ref Range    Glucose - Accu-Ck 119 (H) 65 - 99 mg/dL   ACCU-CHEK GLUCOSE    Collection Time: 01/19/21 11:15 AM   Result Value Ref Range    Glucose - Accu-Ck 148 (H) 65 - 99 mg/dL   BLOOD CULTURE    Collection Time: 01/19/21  3:15 PM    Specimen: Peripheral; Blood   Result Value Ref Range    Significant Indicator NEG     Source BLD     Site PERIPHERAL     Culture Result       No Growth  Note: Blood cultures are incubated for 5 days and  are monitored continuously.Positive blood cultures  are called to the RN and reported as soon as  they are  identified.     BLOOD CULTURE    Collection Time: 01/19/21  3:50 PM    Specimen: Peripheral; Blood   Result Value Ref Range    Significant Indicator NEG     Source BLD     Site PERIPHERAL     Culture Result       No Growth  Note: Blood cultures are incubated for 5 days and  are monitored continuously.Positive blood cultures  are called to the RN and reported as soon as  they are identified.     ACCU-CHEK GLUCOSE    Collection Time: 01/19/21  5:20 PM   Result Value Ref Range    Glucose - Accu-Ck 131 (H) 65 - 99 mg/dL   OCCULT BLOOD X3 (STOOL)    Collection Time: 01/19/21  5:45 PM   Result Value Ref Range    Occult Blood 1 Positive (A) Negative   ACCU-CHEK GLUCOSE    Collection Time: 01/19/21  8:47 PM   Result Value Ref Range    Glucose - Accu-Ck 162 (H) 65 - 99 mg/dL   CBC WITHOUT DIFFERENTIAL    Collection Time: 01/20/21  5:20 AM   Result Value Ref Range    WBC 10.9 (H) 4.8 - 10.8 K/uL    RBC 2.43 (L) 4.70 - 6.10 M/uL    Hemoglobin 7.6 (L) 14.0 - 18.0 g/dL    Hematocrit 24.4 (L) 42.0 - 52.0 %    .4 (H) 81.4 - 97.8 fL    MCH 31.3 27.0 - 33.0 pg    MCHC 31.1 (L) 33.7 - 35.3 g/dL    RDW 60.2 (H) 35.9 - 50.0 fL    Platelet Count 357 164 - 446 K/uL    MPV 9.5 9.0 - 12.9 fL   Comp Metabolic Panel    Collection Time: 01/20/21  5:20 AM   Result Value Ref Range    Sodium 135 135 - 145 mmol/L    Potassium 3.9 3.6 - 5.5 mmol/L    Chloride 95 (L) 96 - 112 mmol/L    Co2 28 20 - 33 mmol/L    Anion Gap 12.0 7.0 - 16.0    Glucose 106 (H) 65 - 99 mg/dL    Bun 25 (H) 8 - 22 mg/dL    Creatinine 4.15 (H) 0.50 - 1.40 mg/dL    Calcium 8.6 8.5 - 10.5 mg/dL    AST(SGOT) 10 (L) 12 - 45 U/L    ALT(SGPT) 6 2 - 50 U/L    Alkaline Phosphatase 108 (H) 30 - 99 U/L    Total Bilirubin 0.3 0.1 - 1.5 mg/dL    Albumin 3.1 (L) 3.2 - 4.9 g/dL    Total Protein 5.9 (L) 6.0 - 8.2 g/dL    Globulin 2.8 1.9 - 3.5 g/dL    A-G Ratio 1.1 g/dL   PROCALCITONIN    Collection Time: 01/20/21  5:20 AM   Result Value Ref Range    Procalcitonin 1.57 (H) <0.25  ng/mL   ESTIMATED GFR    Collection Time: 01/20/21  5:20 AM   Result Value Ref Range    GFR If  17 (A) >60 mL/min/1.73 m 2    GFR If Non  14 (A) >60 mL/min/1.73 m 2   ACCU-CHEK GLUCOSE    Collection Time: 01/20/21  7:04 AM   Result Value Ref Range    Glucose - Accu-Ck 114 (H) 65 - 99 mg/dL   ACCU-CHEK GLUCOSE    Collection Time: 01/20/21 11:03 AM   Result Value Ref Range    Glucose - Accu-Ck 174 (H) 65 - 99 mg/dL   ACCU-CHEK GLUCOSE    Collection Time: 01/20/21  4:59 PM   Result Value Ref Range    Glucose - Accu-Ck 139 (H) 65 - 99 mg/dL   ACCU-CHEK GLUCOSE    Collection Time: 01/21/21  7:52 AM   Result Value Ref Range    Glucose - Accu-Ck 135 (H) 65 - 99 mg/dL       Current Facility-Administered Medications   Medication Frequency   • metoprolol (LOPRESSOR) tablet 12.5 mg DAILY   • insulin regular (HumuLIN R,NovoLIN R) injection 4X/DAY ACHS    And   • dextrose 50% (D50W) injection 50 mL Q15 MIN PRN   • insulin glargine (Lantus) injection QAM INSULIN   • tamsulosin (FLOMAX) capsule 0.8 mg QHS   • gabapentin (NEURONTIN) capsule 300 mg DAILY   • heparin injection 1,800 Units DIALYSIS PRN   • hydrOXYzine HCl (ATARAX) tablet 50 mg Q6HRS PRN   • melatonin tablet 3 mg HS PRN   • Respiratory Therapy Consult Continuous RT   • Pharmacy Consult Request ...Pain Management Review 1 Each PHARMACY TO DOSE   • acetaminophen (Tylenol) tablet 650 mg Q4HRS PRN   • lactulose 20 GM/30ML solution 30 mL QDAY PRN   • docusate sodium (ENEMEEZ) enema 283 mg QDAY PRN   • fleet enema 133 mL QDAY PRN   • artificial tears ophthalmic solution 1 Drop PRN   • benzocaine-menthol (CEPACOL) lozenge 1 Lozenge Q2HRS PRN   • mag hydrox-al hydrox-simeth (MAALOX PLUS ES or MYLANTA DS) suspension 20 mL Q2HRS PRN   • ondansetron (ZOFRAN ODT) dispertab 4 mg 4X/DAY PRN    Or   • ondansetron (ZOFRAN) syringe/vial injection 4 mg 4X/DAY PRN   • traZODone (DESYREL) tablet 50 mg QHS PRN   • sodium chloride (OCEAN) 0.65 % nasal spray 2  Spray PRN   • traMADol (ULTRAM) 50 MG tablet 50 mg Q4HRS PRN   • atorvastatin (LIPITOR) tablet 40 mg Q EVENING   • calcitRIOL (ROCALTROL) capsule 0.25 mcg DAILY   • epoetin (Retacrit) injection (Dialysis use only) 10,000 Units TUE+THU+SAT   • methimazole (TAPAZOLE) tablet 5 mg Q EVENING   • methimazole (TAPAZOLE) tablet 7.5 mg QAM   • omeprazole (PRILOSEC) capsule 20 mg DAILY   • oxyCODONE immediate-release (ROXICODONE) tablet 5 mg Q4HRS PRN   • senna-docusate (PERICOLACE or SENOKOT S) 8.6-50 MG per tablet 2 Tab BID    And   • magnesium hydroxide (MILK OF MAGNESIA) suspension 30 mL QDAY PRN    And   • bisacodyl (DULCOLAX) suppository 10 mg QDAY PRN   • apixaban (ELIQUIS) tablet 2.5 mg BID       Orders Placed This Encounter   Procedures   • Diet Order Diet: Renal; Second Modifier: (optional): Consistent CHO (Diabetic)     Standing Status:   Standing     Number of Occurrences:   1     Order Specific Question:   Diet:     Answer:   Renal [8]     Order Specific Question:   Second Modifier: (optional)     Answer:   Consistent CHO (Diabetic) [4]       Assessment:  Active Hospital Problems    Diagnosis   • *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)   • Abrasion of penis with infection   • Urinary retention   • Paroxysmal A-fib (HCC)   • End stage renal disease on dialysis ([facility])   • Type 2 diabetes mellitus with kidney complication, with long-term current use of insulin ([facility])   • Essential hypertension   • Anemia   • Hyperthyroidism   • Dyslipidemia   • Abdominal pain   • Leukocytosis   • Orthostatic hypotension   • Hypokalemia   • Impaired mobility and ADLs       Medical Decision Making and Plan:  Right CLOVIS  12/31 Dr. Lynn  Continue full rehab program  PT/OT/SLP, 1 hr each discipline, 5 days per week     Outpatient follow-up for suture removal after discharge with surgery  Outpatient follow-up with Dr. Elliott, referral made     Phantom pain, improved/resolved  Continue gabapentin 300 mg, maximum  due to renal failure  Outpatient follow-up with Dr. Elliott, referral made     Encephalopathy, resolved  Speech therapy for cognitive assessment  Currently only with mild memory deficits     ?Depression  Psychology consult, Dr Izaguirre     Urinary retention, continues  Has very high residuals  Continue intermittent caths for over 400 cc, patient refusing  Started Flomax 0.4 mg 1/14 --> 0.8 mg 1/16  PRN lidocaine jelly for caths  Placed indwelling catheter for bladder rest 1/17  Referral to urology made, suspect BPH as cause  Attempt another voiding trial on Monday 1/25 1/21 extensive discussion with patient regarding need for proper bladder management     Bowel program  History of diabetic gastroparesis  Continue bowel medications  Scheduled Sennakot  PRN Miralax, MOM, bisacodyl suppository  Last BM 1/20     DVT prophylaxis  Eliquis     Appreciate the assistance of the hospitalist with his medical comorbidities:     End-stage renal disease on hemodialysis  Consulted nephrology  Continue calcitriol 0.25 mcg daily  Continue Retacrit with dialysis  Continue K-Phos 250 mg 2 times daily     Diabetes with hyperglycemia  Sliding scale insulin  Lantus 5 units daily --> 8 units 1/19     Peripheral artery disease  Continue Eliquis, renal dosing  Continue statin     Coronary artery disease  With history of stents  Continue statin     Paroxysmal atrial fibrillation  Continue Eliquis, renal dosing  Continue metoprolol 37.5 mg twice daily --> dose decreased to 25 mg twice daily --> 12.5 mg BID 1/21     Hypertension  Hypotension  Continue metoprolol 37.5 mg twice daily --> dose decreased to 25 mg twice daily --> 12.5 mg BID 12/21     Hyperlipidemia  Continue statin     Hyperthyroidism  Continue methimazole 7.5 mg in the morning, 5 mg in the evening     GERD  Continue omeprazole 20 mg     Anemia  Likely of chronic renal disease  Continue Retacrit     Leukocytosis, resolved, then returned  Recently treated with IV antibiotics for  pneumonia  Negative urinalysis and chest x-ray  Negative blood cultures  WBC 10.9 1/20, down from 16-day prior.   Monitor     Elevated procalcitonin, improved  Recently treated with IV antibiotics for pneumonia     Penile discharge  Culture with yeast, status post Diflucan     COVID negative 1/11, re-screen 1/17 negative    Total time:  35 minutes.  I spent greater than 50% of the time for patient care and coordination on this date, including unit/floor time, and face-to-face time with the patient as per assessment and plan above.    Bobbi Elliott D.O.

## 2021-01-21 NOTE — THERAPY
"Recreational Therapy  Daily Treatment     Patient Name: Luis Sepulveda  AGE:  75 y.o., SEX:  male  Medical Record #: [id number]  Today's Date: 1/20/2021       Subjective    \"I need to strengthen my left leg.\"    Objective       01/20/21 1031   Functional Ability Status - Cognitive   Attention Span Remains on Task   Comprehension Follows Three Step Commands   Judgment Able to Make Independent Decisions   Functional Ability Status - Emotional    Affect Appropriate;Bright   Mood Appropriate   Behavior Appropriate   Skilled Intervention    Skilled Intervention Gross Motor Leisure   Interdisciplinary Plan of Care Collaboration   IDT Collaboration with  Other (See Comments)  (Recreation Therapy student)   Patient Position at End of Therapy Seated;Call Light within Reach;Tray Table within Reach   Strengths & Barriers   Strengths Able to follow instructions;Alert and oriented;Effective communication skills;Manages pain appropriately;Motivated for self care and independence;Pleasant and cooperative;Supportive family;Willingly participates in therapeutic activities   Treatment Time   Total Time Spent (mins) 30   Procedural Tracking   Procedural Tracking Community Re-Integration;Leisure Skills Development;Gross Motor Functional Leisure Skills   stood for 30 seconds Min A with FWW AND gait belt    Assessment    Pt was able to complete the intervention and presented challenge by choice by wanting to attempt to stand on their left leg. Pt was engaged and animated throughout the virtual golfing intervention. Pt was able to quickly grasp on to the virtual rules of the game and followed along with ease. Pt participated in a virtual golfing intervention. The intervention was while the Pt was seated in their wheelchair, as well as once while standing on their left leg.      Strengths: (P) Able to follow instructions, Alert and oriented, Effective communication skills, Manages pain appropriately, Motivated for self care and " independence, Pleasant and cooperative, Supportive family, Willingly participates in therapeutic activities  Barriers: Decreased endurance, Fatigue, Generalized weakness, Impaired activity tolerance, Impaired balance, Limited mobility, Pain    Plan    Pt is to continue a combination of cognitive leisure therapy as well as dual standing tolerance therapy under the guide of a Recreation Therapist.

## 2021-01-21 NOTE — THERAPY
Recreational Therapy  Daily Treatment     Patient Name: Luis Sepulveda  AGE:  75 y.o., SEX:  male  Medical Record #: [id number]  Today's Date: 1/21/2021       Subjective    Pt reporting fatigue in L LE and how he had been working hard in his prior therapies.      Objective       01/21/21 1031   Functional Ability Status - Cognitive   Attention Span Remains on Task   Comprehension Follows Three Step Commands   Judgment Able to Make Independent Decisions   Functional Ability Status - Emotional    Affect Appropriate   Mood Appropriate   Behavior Appropriate   Skilled Intervention    Skilled Intervention Relaxation / Coping Skills   Skilled Intervention Comments seated Abraham hester   Interdisciplinary Plan of Care Collaboration   IDT Collaboration with  Other (See Comments)  (recreation therapy student)   Patient Position at End of Therapy Seated;Call Light within Reach;Tray Table within Reach   Strengths & Barriers   Strengths Able to follow instructions;Effective communication skills;Alert and oriented;Good carryover of learning;Good insight into deficits/needs;Motivated for self care and independence;Pleasant and cooperative;Willingly participates in therapeutic activities   Barriers Decreased endurance;Fatigue;Pain;Impaired balance   Treatment Time   Total Time Spent (mins) 30   Procedural Tracking   Procedural Tracking Leisure Skills Development;Gross Motor Functional Leisure Skills   Pt remained seated and did not attempt to stand during activity by choice.     Assessment    Fatigue was stated by the Pt as the reason for him not wanting to participate in standing during the activity.     Strengths: (P) Able to follow instructions, Effective communication skills, Alert and oriented, Good carryover of learning, Good insight into deficits/needs, Motivated for self care and independence, Pleasant and cooperative, Willingly participates in therapeutic activities  Barriers: (P) Decreased endurance, Fatigue, Pain,  Impaired balance    Plan    Standing dual tasking.

## 2021-01-21 NOTE — WOUND TEAM
Nursing Communication placed for the sutures to be removed from right BKA incision per Limb Preservation Service. Patient is at dialysis now so Night shift RN to remove.

## 2021-01-21 NOTE — THERAPY
Physical Therapy   Daily Treatment     Patient Name: Luis Sepulveda  Age:  75 y.o., Sex:  male  Medical Record #: [id number]  Today's Date: 1/20/2021     Precautions  Precautions: Fall Risk, Non Weight Bearing Right Lower Extremity  Comments: R BKA, R IPOP, dialysis Tues, Thurs, Sat, L arm fistula    Subjective    Pt agreeable to PT     Objective       01/20/21 1501   Pain   Intervention Repositioned   Pain 0 - 10 Group   Location Penis   Therapist Pain Assessment During Activity   Supine Lower Body Exercise   Supine Lower Body Exercises Yes   Hip Flexion 3 sets of 10;Left   Hip Abduction Side Lying;3 sets of 10;Left   Hip Adduction  3 sets of 10;Bilateral   Short Arc Quad 3 sets of 10;Left   Gluteal Isometrics 1 set of 10   Quadriceps Isometrics 1 set of 10   Other Exercises 3 x 10 prone hip ext and knee flexion L LE, prone hip flexor stretch x 10 mintues   Bed Mobility    Supine to Sit Stand by Assist   Sit to Supine Stand by Assist   Neuro-Muscular Treatments   Neuro-Muscular Treatments Verbal Cuing;Sequencing   Comments transfer training, reach pivot method with cues for set up, sequencing and technique, CGA/Simone   Interdisciplinary Plan of Care Collaboration   IDT Collaboration with  Physical Therapist   Patient Position at End of Therapy Seated   Collaboration Comments POC   PT Total Time Spent   PT Individual Total Time Spent (Mins) 60   PT Charge Group   PT Therapeutic Exercise 3   PT Therapeutic Activities 1   Supervising Physical Therapist Crow BRANNON education ie desensitization, IPOP, hip flexor stretching and mat program  Assessment    Pt completed mat program with cues for proper form and control. Will benefit from continued practice     Strengths: Independent prior level of function, Making steady progress towards goals, Willingly participates in therapeutic activities  Barriers: Fatigue, Generalized weakness, Impaired activity tolerance, Impaired balance(Impaired endurance, fall  risk)    Plan    Exercise BLE - need to build safer strength on LLE, progress BKA edu, go over wrapping strategy, STS FWW, transfer safety, ambulation with close wc follow, go over needs for DC - wc/ramp? SPV, home accessibility?    Physical Therapy Problems     Problem: Mobility     Dates: Start: 01/14/21       Goal: STG-Within one week, patient will propel wheelchair community     Dates: Start: 01/14/21       Description: 1) Individualized goal:  150 ft At mod I in order to improve activity tolerance propelling himself around facility  2) Interventions:  PT Gait Training, PT Therapeutic Exercises, PT Neuro Re-Ed/Balance, PT Therapeutic Activity, PT Manual Therapy, and PT Evaluation                  Problem: PT-Long Term Goals     Dates: Start: 01/14/21       Goal: LTG-By discharge, patient will propel wheelchair     Dates: Start: 01/14/21       Description: 1) Individualized goal:  150 ft At mod I indoor and outdoor in order to improve activity tolerance propelling himself around facility  2) Interventions:  PT Gait Training, PT Therapeutic Exercises, PT Neuro Re-Ed/Balance, PT Therapeutic Activity, PT Manual Therapy, and PT Evaluation          Goal: LTG-By discharge, patient will transfer one surface to another     Dates: Start: 01/14/21       Description: 1) Individualized goal:  At mod I with LRD In order to maximize self mobility  2) Interventions:  PT Gait Training, PT Therapeutic Exercises, PT Neuro Re-Ed/Balance, PT Therapeutic Activity, PT Manual Therapy, and PT Evaluation          Goal: LTG-By discharge, patient will transfer in/out of a car     Dates: Start: 01/14/21       Description: 1) Individualized goal:  At SPV with LRD In order to maximize self mobility  2) Interventions:  PT Gait Training, PT Therapeutic Exercises, PT Neuro Re-Ed/Balance, PT Therapeutic Activity, PT Manual Therapy, and PT Evaluation

## 2021-01-21 NOTE — THERAPY
Speech Language Pathology  Daily Treatment     Patient Name: Luis Sepulveda  Age:  75 y.o., Sex:  male  Medical Record #: [id number]  Today's Date: 1/21/2021     Precautions  Precautions: Fall Risk, Non Weight Bearing Right Lower Extremity  Comments: R BKA, R IPOP, dialysis Tues, Thurs, Sat, L arm fistula, skin breakdown L heel    Subjective    Pt pleasant and cooperative.      Objective       01/21/21 1303   SLP Total Time Spent   SLP Individual Total Time Spent (Mins) 60   Treatment Charges   SLP Cognitive Skill Development First 15 Minutes 1   SLP Cognitive Skill Development Additional 15 Minutes 3       Assessment    Functional medication sort completed at this time. Pt reporting difficulty with vision periodically throughout task requring mod-max a to read the given medication label. Pt also demonstrating difficult with decreased RUE weakness and decreased coordination resulting in dropping and spilling frequently throughout the given tasks. Pt presenting with errors X2 and required MAX A for correction. At one point pt was attempting to make AM and PM boxes match and therefore started to remove medications that were necessary such as one time a day medications due to confusion that they were not in both Am and Pm boxes.     Strengths: Able to follow instructions, Independent prior level of function  Barriers: Impulsive, Impaired functional cognition, Impaired insight/denial of deficits    Plan    Cont to address recall, attention and functional problem solving     Speech Therapy Problems     Problem: Memory STGs     Dates: Start: 01/14/21       Goal: STG-Within one week, patient will     Dates: Start: 01/14/21       Description: 1) Individualized goal:  complete verbal recall tasks with brief (5-10 minute) delay with 70% accuracy provided MIN cues.   2) Interventions:  SLP Self Care / ADL Training , SLP Cognitive Skill Development, and SLP Group Treatment                Problem: Problem Solving STGs      Dates: Start: 01/14/21       Goal: STG-Within one week, patient will     Dates: Start: 01/14/21       Description: 1) Individualized goal:  complete complex attention tasks involving medication management with 80% accuracy provided SPV.   2) Interventions:  SLP Self Care / ADL Training , SLP Cognitive Skill Development, and SLP Group Treatment                Problem: Speech/Swallowing LTGs     Dates: Start: 01/14/21       Goal: LTG-By discharge, patient will     Dates: Start: 01/14/21       Description: 1) Individualized goal:  complete functional problem solving and recall information with 80% accuracy and MOD I.   2) Interventions:  SLP Self Care / ADL Training , SLP Cognitive Skill Development, and SLP Group Treatment

## 2021-01-22 NOTE — THERAPY
Recreational Therapy  Daily Treatment     Patient Name: Luis Sepulveda  AGE:  75 y.o., SEX:  male  Medical Record #: [id number]  Today's Date: 1/22/2021       Subjective    Pt speaking on pain in groin from palacios as being a barrier to his participation. Pt reporting that he would need to be seated during the session due to thi cb.      Objective       01/22/21 1031   Functional Ability Status - Cognitive   Attention Span Remains on Task   Comprehension Follows Three Step Commands   Judgment Able to Make Independent Decisions   Functional Ability Status - Emotional    Affect Appropriate   Mood Appropriate   Behavior Appropriate   Skilled Intervention    Skilled Intervention Relaxation / Coping Skills   Skilled Intervention Comments Seated pain management distraction technique   Interdisciplinary Plan of Care Collaboration   IDT Collaboration with  Other (See Comments);Physician  (recreation therapy student)   Patient Position at End of Therapy Seated;Tray Table within Reach;Call Light within Reach   Collaboration Comments Speaking with Pt during session   Strengths & Barriers   Strengths Able to follow instructions;Alert and oriented;Good insight into deficits/needs;Motivated for self care and independence;Pleasant and cooperative;Supportive family;Willingly participates in therapeutic activities   Barriers Pain;Decreased endurance;Impaired activity tolerance;Impaired balance   Treatment Time   Total Time Spent (mins) 30   Procedural Tracking   Procedural Tracking Leisure Skills Development       Assessment    Pt was given two cognitive multi step leisure activities. Pt was to use these complex activities as a pain management tool as a distraction. Pt given one activity to borrow over the weekend for distraction.     Strengths: (P) Able to follow instructions, Alert and oriented, Good insight into deficits/needs, Motivated for self care and independence, Pleasant and cooperative, Supportive family, Willingly  participates in therapeutic activities  Barriers: (P) Pain, Decreased endurance, Impaired activity tolerance, Impaired balance    Plan    Check in with Pt following weekend for effectiveness of the activity.

## 2021-01-22 NOTE — THERAPY
Physical Therapy   Daily Treatment     Patient Name: Luis Sepulveda  Age:  75 y.o., Sex:  male  Medical Record #: [id number]  Today's Date: 1/22/2021     Precautions  Precautions: Fall Risk, Non Weight Bearing Right Lower Extremity  Comments: R BKA, R IPOP, dialysis Tues, Thurs, Sat, L arm fistula, skin breakdown L heel    Subjective    Pt reports he has to go to the bathroom     Objective       01/22/21 0831   Transfer Functional Level of Assist   Bed, Chair, Wheelchair Transfer Minimal Assist   Bed Chair Wheelchair Transfer Description Adaptive equipment;Increased time;Verbal cueing;Requires lift   Toilet Transfers Minimal Assist   Toilet Transfer Description Grab bar;Increased time;Verbal cueing;Requires lift   Standing Lower Body Exercises   Mini Squat 2 sets of 10;Partial  (Lost eccentric contorl and sat quickly end of sets)   Bed Mobility    Supine to Sit Supervised   Sit to Stand Contact Guard Assist   Scooting Supervised   Rolling Supervised   Interdisciplinary Plan of Care Collaboration   Patient Position at End of Therapy Seated   PT Total Time Spent   PT Individual Total Time Spent (Mins) 60   PT Charge Group   PT Therapeutic Exercise 1   PT Therapeutic Activities 3     Discussed wc measurements in home - pt reports he has had a wc in the house before that fit through all the doors and rooms    Assessment    Pt cont to demo need for CGA - min A for transfers and STS, quick fatigue on LLE with any exercise or repeated transfers, lost eccentric control into chair during exercise    Strengths: Independent prior level of function, Making steady progress towards goals, Willingly participates in therapeutic activities  Barriers: Fatigue, Generalized weakness, Impaired activity tolerance, Impaired balance(Impaired endurance, fall risk)    Plan    Exercise BLE - need to build safer strength on LLE, progress BKA edu, go over wrapping strategy, STS FWW, transfer safety, ambulation with close wc follow, SPT  training with FWW, trial car transfer    Physical Therapy Problems     Problem: Mobility     Dates: Start: 01/14/21       Goal: STG-Within one week, patient will propel wheelchair community     Dates: Start: 01/14/21       Description: 1) Individualized goal:  150 ft At mod I in order to improve activity tolerance propelling himself around facility  2) Interventions:  PT Gait Training, PT Therapeutic Exercises, PT Neuro Re-Ed/Balance, PT Therapeutic Activity, PT Manual Therapy, and PT Evaluation                  Problem: PT-Long Term Goals     Dates: Start: 01/14/21       Goal: LTG-By discharge, patient will propel wheelchair     Dates: Start: 01/14/21       Description: 1) Individualized goal:  150 ft At mod I indoor and outdoor in order to improve activity tolerance propelling himself around facility  2) Interventions:  PT Gait Training, PT Therapeutic Exercises, PT Neuro Re-Ed/Balance, PT Therapeutic Activity, PT Manual Therapy, and PT Evaluation          Goal: LTG-By discharge, patient will transfer one surface to another     Dates: Start: 01/14/21       Description: 1) Individualized goal:  At mod I with LRD In order to maximize self mobility  2) Interventions:  PT Gait Training, PT Therapeutic Exercises, PT Neuro Re-Ed/Balance, PT Therapeutic Activity, PT Manual Therapy, and PT Evaluation          Goal: LTG-By discharge, patient will transfer in/out of a car     Dates: Start: 01/14/21       Description: 1) Individualized goal:  At SPV with LRD In order to maximize self mobility  2) Interventions:  PT Gait Training, PT Therapeutic Exercises, PT Neuro Re-Ed/Balance, PT Therapeutic Activity, PT Manual Therapy, and PT Evaluation

## 2021-01-22 NOTE — PROGRESS NOTES
Hospital Medicine Daily Progress Note      Chief Complaint:  Hypertension  Afib  Diabetes  Leukocytosis  Penile wound    Interval History:  Blood pressures trending low; pt asymptomatic.    Review of Systems  Review of Systems   Constitutional: Negative for chills and fever.   HENT: Negative.    Eyes: Negative.    Respiratory: Negative for cough and shortness of breath.    Cardiovascular: Negative for chest pain and palpitations.   Gastrointestinal: Negative for abdominal pain, nausea and vomiting.   Musculoskeletal:        Wound pain    Skin: Negative for itching and rash.   Endo/Heme/Allergies: Negative for polydipsia. Does not bruise/bleed easily.        Physical Exam  Temp:  [36.6 °C (97.9 °F)-37.1 °C (98.7 °F)] 37 °C (98.6 °F)  Pulse:  [87-90] 87  Resp:  [17-18] 18  BP: (102-135)/(44-76) 135/76  SpO2:  [95 %-98 %] 97 %    Physical Exam  Vitals signs reviewed.   Constitutional:       General: He is not in acute distress.     Appearance: Normal appearance. He is not ill-appearing.   HENT:      Head: Normocephalic and atraumatic.      Right Ear: External ear normal.      Left Ear: External ear normal.      Nose: Nose normal.      Mouth/Throat:      Pharynx: Oropharynx is clear.   Eyes:      General:         Right eye: No discharge.         Left eye: No discharge.      Extraocular Movements: Extraocular movements intact.      Conjunctiva/sclera: Conjunctivae normal.   Neck:      Musculoskeletal: Normal range of motion and neck supple.   Cardiovascular:      Rate and Rhythm: Normal rate and regular rhythm.   Pulmonary:      Effort: No respiratory distress.      Breath sounds: No wheezing.      Comments: Decreased BS   Abdominal:      General: Bowel sounds are normal. There is no distension.      Palpations: Abdomen is soft.      Tenderness: There is no abdominal tenderness.   Musculoskeletal:      Left lower leg: No edema.      Comments: Right BKA in stump protector   Skin:     General: Skin is warm and dry.    Neurological:      Mental Status: He is alert and oriented to person, place, and time.         Fluids    Intake/Output Summary (Last 24 hours) at 1/23/2021 1705  Last data filed at 1/23/2021 1245  Gross per 24 hour   Intake 980 ml   Output 1800 ml   Net -820 ml       Laboratory  Recent Labs     01/23/21  0454   WBC 8.7   RBC 2.46*   HEMOGLOBIN 7.8*   HEMATOCRIT 24.7*   .4*   MCH 31.7   MCHC 31.6*   RDW 58.5*   PLATELETCT 323   MPV 9.2     Recent Labs     01/23/21  0454   SODIUM 133*   POTASSIUM 4.0   CHLORIDE 94*   CO2 25   GLUCOSE 94   BUN 35*   CREATININE 5.57*   CALCIUM 8.8                 Assessment/Plan  * 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)- (present on admission)  Assessment & Plan  2/2 PVD and DM with non-healing wound and gangrene  S/P right BKA on 12/31/20 by Dr. Lynn  Wound care and pain control    Abrasion of penis with infection- (present on admission)  Assessment & Plan  Thought to be 2/2 Tomas trauma  Wound Cx +yeast  Completed Fluconazole    Paroxysmal A-fib (HCC)- (present on admission)  Assessment & Plan  Rate controlled on Metoprolol  Anticoagulated on Eliquis    End stage renal disease on dialysis ([facility])- (present on admission)  Assessment & Plan  On maintenance HD TTS per [facility]    Type 2 diabetes mellitus with kidney complication, with long-term current use of insulin ([facility])- (present on admission)  Assessment & Plan  HbA1c 5.2  Serum glucose controlled on Lantus and SSI  Outpt meds include Lantus 5 units qhs    Anemia- (present on admission)  Assessment & Plan  Has macrocytic indices  Fe, Vit B12, Folate, and TSH levels all normal  On Epoetin per Nephrology  FOB+, increase PPI to bid  Suggest GI F/U  Follow H/H  Check F/U labs in am     Essential hypertension- (present on admission)  Assessment & Plan  Continue to decrease Metoprolol further for hypotension  Monitor for orthostatic hypotension on Flomax, consider decreasing dose  Will give  IVF bolus for low blood pressures    Hyperthyroidism- (present on admission)  Assessment & Plan  Euthyroid on Methimazole    Leukocytosis  Assessment & Plan  UA 5-10 WBC w/ negative bacteria, UCx negative  CXR improved w/ no new consolidation  BCx x 2 NGTD  PCT elevated but downtrending compared w/ recent values  WBC improving w/o intervention    Abdominal pain  Assessment & Plan  KUB unremarkable    Dyslipidemia- (present on admission)  Assessment & Plan  On Lipitor    DNR

## 2021-01-22 NOTE — PROGRESS NOTES
Hd treatment ordered by Dr Hunter started at 1511 and ended at 1841 with net UF of 2 liters as bp tolerated. Intra treatment, pt had episodes of bowel movements x 3. Cleaned up every time. Heart rate becomes irregular close to yhe end of treatment, Primary RN Luz Elena was notified. Post treatment, held sites for 20 minutes with no bleeding noted. See flow sheet for details.

## 2021-01-22 NOTE — CONSULTS
BRIEF PSYCHOLOGY NOTE: Consult received. Patient will be evaluated this weekend, most likely today Friday, 1/22/21 or tomorrow 1/23/21.     Thank you for the consult.

## 2021-01-22 NOTE — THERAPY
Occupational Therapy  Daily Treatment     Patient Name: Luis Sepulveda  Age:  75 y.o., Sex:  male  Medical Record #: [id number]  Today's Date: 1/22/2021     Precautions  Precautions: Fall Risk, Non Weight Bearing Right Lower Extremity  Comments: R BKA, R IPOP, dialysis Tues, Thurs, Sat, L arm fistula, skin breakdown L heel    Safety   ADL Safety : Requires Supervision for Safety  Bathroom Safety: Requires Supervision for Safety  Comments: See below notes for ADL performance details.    Subjective    Pt received up in w/c. Per nursing, pt with low BP, but cleared for therapy.     Objective       01/22/21 1301   Precautions   Precautions Fall Risk;Non Weight Bearing Right Lower Extremity   Comments R BKA, R IPOP, dialysis Tues, Thurs, Sat, L arm fistula, skin breakdown L heel   Cognition    Level of Consciousness Alert   Functional Level of Assist   Lower Body Dressing Minimal Assist   Lower Body Dressing Description Increased time;Set-up of equipment;Supervision for safety;Verbal cueing  (min A/FWW for standing balance to pull up pants)   Sitting Upper Body Exercises   Bilateral Row 3 sets of 15;Bilateral;Weight (See Comments for lbs)  (30# weighted pulleys)   Tricep Press 3 sets of 15;Bilateral;Weight (See Comments for lbs)  (Rickshaw facing fwd, 3x15 reps with 25, 30, 35#)   Upper Extremity Bike Level 3 Resistance  (FluidoBike for UB strength/endurance x15 min, 1 RB, 1.2km)   Interdisciplinary Plan of Care Collaboration   IDT Collaboration with  Nursing   Patient Position at End of Therapy Seated;Chair Alarm On;Self Releasing Lap Belt Applied;Call Light within Reach;Tray Table within Reach;Phone within Reach   Collaboration Comments CLOF, low BP   OT Total Time Spent   OT Individual Total Time Spent (Mins) 60   OT Charge Group   OT Self Care / ADL 1   OT Therapeutic Exercise  3     Attempted to call pt's son, Miguel, to discuss recommended bathroom equipment and GB placement. Left VM and plan to follow up before  d/c next week.     Assessment    Pt tolerated OT session well with focus on UB strength and endurance. Pt completed exercises noted above with intermittent rest breaks and cues for pacing. Per nursing pt with low BP, but pt with no c/o dizziness or lightheadedness during session. Pt con't to be motivated and participatory.     Strengths: Alert and oriented, Effective communication skills, Independent prior level of function, Making steady progress towards goals, Pleasant and cooperative, Supportive family, Willingly participates in therapeutic activities  Barriers: Decreased endurance, Fatigue, Impaired balance, safety awareness    Plan    ADLs, IADLs and related functional mobility, Threshold shower transfers, BUE/Core strength, standing mike/balance.     Occupational Therapy Goals     Problem: Dressing     Dates: Start: 01/14/21       Goal: STG-Within one week, patient will dress LB     Dates: Start: 01/14/21       Description: 1) Individualized Goal:  with Min A and AE as needed  2) Interventions:  OT Group Therapy, OT Self Care/ADL, OT Cognitive Skill Dev, OT Community Reintegration, OT Manual Ther Technique, OT Neuro Re-Ed/Balance, OT Therapeutic Activity, OT Evaluation, and OT Therapeutic Exercise          Note:     Goal Note filed on 01/18/21 1149 by Romy Collins, OT    Requires max A.                         Problem: Functional Transfers     Dates: Start: 01/14/21       Goal: STG-Within one week, patient will transfer to toilet     Dates: Start: 01/14/21       Description: 1) Individualized Goal:  with CGA and AE as needed  2) Interventions:  OT Group Therapy, OT Self Care/ADL, OT Cognitive Skill Dev, OT Community Reintegration, OT Manual Ther Technique, OT Neuro Re-Ed/Balance, OT Therapeutic Activity, OT Evaluation, and OT Therapeutic Exercise       Note:     Goal Note filed on 01/18/21 1149 by Romy Collins, OT    Min A to sba.                   Goal: STG-Within one week, patient will transfer to  tub/shower     Dates: Start: 01/14/21       Description: 1) Individualized Goal:  with CGA and AE as needed  2) Interventions:  OT Group Therapy, OT Self Care/ADL, OT Cognitive Skill Dev, OT Community Reintegration, OT Manual Ther Technique, OT Neuro Re-Ed/Balance, OT Therapeutic Activity, OT Evaluation, and OT Therapeutic Exercise    Note:     Goal Note filed on 01/18/21 1149 by Romy Collins, OT    To be addressed.                         Problem: OT Long Term Goals     Dates: Start: 01/14/21       Goal: LTG-By discharge, patient will complete basic self care tasks     Dates: Start: 01/14/21       Description: 1) Individualized Goal:  with mod I and AE as needed  2) Interventions:  OT Group Therapy, OT Self Care/ADL, OT Cognitive Skill Dev, OT Community Reintegration, OT Manual Ther Technique, OT Neuro Re-Ed/Balance, OT Therapeutic Activity, OT Evaluation, and OT Therapeutic Exercise      Note:     Goal Note filed on 01/14/21 1519 by Zeina Taylor MS,OTR/L    1) Individualized Goal:  with mod I and AE as needed  2) Interventions:  OT Group Therapy, OT Self Care/ADL, OT Cognitive Skill Dev, OT Community Reintegration, OT Manual Ther Technique, OT Neuro Re-Ed/Balance, OT Therapeutic Activity, OT Evaluation, and OT Therapeutic Exercise                   Goal: LTG-By discharge, patient will perform bathroom transfers     Dates: Start: 01/14/21       Description: 1) Individualized Goal:  with mod I and AE as needed  2) Interventions:  OT Group Therapy, OT Self Care/ADL, OT Cognitive Skill Dev, OT Community Reintegration, OT Manual Ther Technique, OT Neuro Re-Ed/Balance, OT Therapeutic Activity, OT Evaluation, and OT Therapeutic Exercise       Note:     Goal Note filed on 01/14/21 1519 by Zeina Taylor MS,OTR/L    1) Individualized Goal:  with mod I and AE as needed  2) Interventions:  OT Group Therapy, OT Self Care/ADL, OT Cognitive Skill Dev, OT Community Reintegration, OT Manual Ther Technique, OT Neuro  Re-Ed/Balance, OT Therapeutic Activity, OT Evaluation, and OT Therapeutic Exercise

## 2021-01-22 NOTE — PROGRESS NOTES
"[facility] Nephrology Progress Note    Author: Hui ECKERT  Collaborating Physician: Mario Hunter MD    Date of Service  1/22/2021    Chief Complaint  A 75-year-old male with end-stage renal disease   on chronic hemodialysis Tuesdays, Thursdays and Saturdays.  The patient was   admitted on 12/31 for right below-the-knee amputation.  He had wound and   gangrene of the right foot.  He has had some peripheral vascular procedures   previously.  He did have a right ray amputation in 11/2020.  He has had a   nonhealing wound and he was admitted for right below-the-knee amputation that   was performed on 12/31.  We have been consulted to assist in his dialysis   needs.  He had dialysis on 12/31 prior to admission.  Today, he is feeling   dyspneic.  He has no cough or sputum production.    Daily Nephrology Summary   1/13- Transferred to Rehab  1/14 - Pt sitting up in W/C in room, denies any complaints/concerns, due for HD today, BP labile, K+ 3.1, per chart review-penile discharge cx sent  1/15 - Pt getting up to restroom to shower with assistance, HD yesterday with 2.1L UF, K+ corrected to 3.6, iron stores replete, no growth on penile wound cx, VSS on RA   1/16 - No overnight events, HD later today.  No labs for review.  Sitting up at bedside eating lunch.  No complaints.   1/18 - No new events. Pt with palacios. C/O GI issues, bowels are \"loose\". No new events.   1/19 - HD today. VSS. No new events.Pt seen during therapy. C/O about palacios catheter.  1/20 - HD yest, UF 1.7L. Occult blood stool +. CXR improved. Abd xray no obstruction. Abd pain improved after 2 large BMs. C/O palacios catheter.  1/21 - Voiding trial on Mon. Due for HD today. Sleepy today 2/2 sleeping pill last hs, slept very well.  1/21 - Pt no longer with abd pain. C/O \"sore bottom\" and palacios catheter discomfort.    Review of Systems  Review of Systems   Constitutional: Positive for malaise/fatigue. Negative for chills and fever.   HENT: Negative.  "   Eyes: Negative.    Respiratory: Negative.    Cardiovascular: Negative.    Gastrointestinal: Negative.  Negative for abdominal pain, nausea and vomiting.   Genitourinary:        Tomas catheter   Musculoskeletal: Positive for joint pain. Negative for back pain and myalgias.        C/O RLE phantom pain   Skin: Negative.    Neurological: Negative.    Endo/Heme/Allergies: Negative.    Psychiatric/Behavioral: Negative.       Physical Exam  Temp:  [36.7 °C (98.1 °F)-37.1 °C (98.8 °F)] 36.7 °C (98.1 °F)  Pulse:  [] 99  Resp:  [18] 18  BP: ()/(47-61) 100/47  SpO2:  [92 %-98 %] 93 %    Physical Exam  Constitutional:       Appearance: Normal appearance.   HENT:      Head: Normocephalic and atraumatic.      Nose: Nose normal.      Mouth/Throat:      Mouth: Mucous membranes are moist.      Pharynx: Oropharynx is clear.   Eyes:      Extraocular Movements: Extraocular movements intact.      Pupils: Pupils are equal, round, and reactive to light.   Neck:      Musculoskeletal: Normal range of motion and neck supple.   Cardiovascular:      Rate and Rhythm: Normal rate and regular rhythm.      Pulses: Normal pulses.      Comments: L AVF +T/B  Pulmonary:      Effort: Pulmonary effort is normal.      Breath sounds: Normal breath sounds.   Abdominal:      General: Bowel sounds are normal.      Palpations: Abdomen is soft.   Musculoskeletal:         General: No deformity.      Comments: R BKA    Skin:     General: Skin is warm and dry.   Neurological:      General: No focal deficit present.      Mental Status: He is alert and oriented to person, place, and time.   Psychiatric:         Mood and Affect: Mood normal.         Behavior: Behavior normal.         Fluids    Intake/Output Summary (Last 24 hours) at 1/22/2021 0836  Last data filed at 1/22/2021 0509  Gross per 24 hour   Intake 860 ml   Output 2950 ml   Net -2090 ml       Laboratory  Recent Labs     01/20/21  0520   WBC 10.9*   RBC 2.43*   HEMOGLOBIN 7.6*   HEMATOCRIT  24.4*   .4*   MCH 31.3   MCHC 31.1*   RDW 60.2*   PLATELETCT 357   MPV 9.5     Recent Labs     01/20/21  0520   SODIUM 135   POTASSIUM 3.9   CHLORIDE 95*   CO2 28   GLUCOSE 106*   BUN 25*   CREATININE 4.15*   CALCIUM 8.6         No results for input(s): NTPROBNP in the last 72 hours.        Imaging  Available labs, imaging and clinical documentation reviewed.     Assessment/Plan  #  End-stage renal disease, on chronic hemodialysis qTTS  - Normally dialyzes at [facility] via L AVF   #  Status post right BKA on 12/31/2020 for diabetic ulcer.  #  Hypertension, controlled  - On metoprolol   #  Anemia: superimposed blood loss in addition to chronic kidney disease.  - Hgb below target   - On ARMANDO   - Iron replete, ferritin elevated   #  Diabetes mellitus.  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  - On calcitriol and calcium acetate   - Calcium acetate currently being held   - Vit D 48   - Phos 4.1  - iPTH 109   #  Peripheral vascular disease.   #  Paroxysmal atrial fibrillation, on Eliquis and metoprolol.   #  Coronary artery disease, status post stents.  Asymptomatic.  #  History of hyperthyroidism. On methimazole.   #  Dyslipidemia, on statin therapy.   #  Past history of heavy tobacco use.  #  Pneumonia: s/p IV Zosyn   #  Encephalopathy: resolving  #  Weakness/debility  #  Urinary retention requiring catheter placement  - Chronic oliguria  #  Hypokalemia, corrected  #  Hyponatremia in ESRD, mild, improved  #  Penile meatus wound  - Tomas  - NGTD on cx      PLAN:  -Hemodialysis qTTS/PRN  -UF as tolerated  -ARMANDO TIW with HD, goal Hgb 10-11   -Transfuse PRN Hgb <7  -Consider changing metoprolol to carvedilol as it is not dialyzed w/ HD  -Goal BP <140/90  -No dietary protein restrictions  -Dose meds for ESRD  -Limit narcotics/sedating meds  -PT/OT/Speech  -Pt will likely need urology consult for urinary retention  -Voiding trial on Monday    Thank you,

## 2021-01-22 NOTE — CARE PLAN
Problem: Safety  Goal: Will remain free from injury  Outcome: PROGRESSING AS EXPECTED  Note: Pt uses call light  appropriately. Waits for assistance and does not attempt self transfer this shift. Able to verbalize needs.      Problem: Bowel/Gastric:  Goal: Normal bowel function is maintained or improved  Outcome: PROGRESSING SLOWER THAN EXPECTED  Note: Pt continent to incontinent of bowel this shift. Cleansed and kept dry throughout the night. Will continue to monitor.     Problem: Urinary Elimination:  Goal: Ability to reestablish a normal urinary elimination pattern will improve  Outcome: PROGRESSING SLOWER THAN EXPECTED  Note: Pt with palacios catheter to gravity draining adequate amount of clear humberto urine. Palacios care provided by staff. He denies dysuria.

## 2021-01-22 NOTE — PROGRESS NOTES
HS [id number]. No insulin coverage per sliding scale per MAR. HS snack provided and consumed. Will continue to monitor.

## 2021-01-22 NOTE — PROGRESS NOTES
"Rehab Progress Note     Encounter Date: 1/22/2021    CC: Penile pain    Interval Events (Subjective)  Vital signs reviewed continues to have intermittent low blood pressure in the systolic 90s.  2 BMs 1/22.  Tomas catheter in place.    Patient seen and examined in the gym and then again in his room.  Patient states that he has significant discomfort with the Tomas catheter.  He states that he can hardly even focus on therapy or doing anything because of the pain that he gets.  He states that he has pain in the penis and in the bladder.  He does okay at night because he does not have to move, but during the day he states that he is in extreme discomfort.  We had a very extensive discussion regarding removal of the Tomas, but that the patient will have to remain compliant with allowing nurses to do intermittent catheterization if he is unable to void on his own.  Patient agreeable.    14 point ROS reviewed and negative except as stated above.     Objective:  VITAL SIGNS: /47   Pulse 99   Temp 36.7 °C (98.1 °F) (Oral)   Resp 18   Ht 1.651 m (5' 5\")   Wt 76.5 kg (168 lb 10.4 oz)   SpO2 93%   BMI 28.07 kg/m²     GEN: No apparent distress, sitting in manual wheelchair.  HEENT: Head normocephalic, atraumatic.  Sclera nonicteric bilaterally, no ocular discharge appreciated bilaterally.  CV: Extremities warm and well-perfused, no peripheral edema appreciated bilaterally.  PULMONARY: Breathing nonlabored on room air, no respiratory accessory muscle use.  Not requiring supplemental oxygen.  ABD: Soft, nontender.  : Tomas catheter present draining clear translucent urine.  On exam 1/22 there is white curdled buildup around the neck of the glans of the penis.  Patient is very hypersensitive to even light touch.  Area of skin breakdown at urethral meatus is improved but still present.  SKIN: Skin breakdown urethral meatus.  Improved from prior exam.  MSK: Right amputation appreciated.  NEURO: Awake alert.  " Conversational.  Logical thought content.  PSYCH: Mood and affect within normal limits.    Recent Results (from the past 72 hour(s))   ACCU-CHEK GLUCOSE    Collection Time: 01/19/21 11:15 AM   Result Value Ref Range    Glucose - Accu-Ck 148 (H) 65 - 99 mg/dL   BLOOD CULTURE    Collection Time: 01/19/21  3:15 PM    Specimen: Peripheral; Blood   Result Value Ref Range    Significant Indicator NEG     Source BLD     Site PERIPHERAL     Culture Result       No Growth  Note: Blood cultures are incubated for 5 days and  are monitored continuously.Positive blood cultures  are called to the RN and reported as soon as  they are identified.     BLOOD CULTURE    Collection Time: 01/19/21  3:50 PM    Specimen: Peripheral; Blood   Result Value Ref Range    Significant Indicator NEG     Source BLD     Site PERIPHERAL     Culture Result       No Growth  Note: Blood cultures are incubated for 5 days and  are monitored continuously.Positive blood cultures  are called to the RN and reported as soon as  they are identified.     ACCU-CHEK GLUCOSE    Collection Time: 01/19/21  5:20 PM   Result Value Ref Range    Glucose - Accu-Ck 131 (H) 65 - 99 mg/dL   OCCULT BLOOD X3 (STOOL)    Collection Time: 01/19/21  5:45 PM   Result Value Ref Range    Occult Blood 1 Positive (A) Negative    Occult Blood 3 Positive (A) Negative   ACCU-CHEK GLUCOSE    Collection Time: 01/19/21  8:47 PM   Result Value Ref Range    Glucose - Accu-Ck 162 (H) 65 - 99 mg/dL   CBC WITHOUT DIFFERENTIAL    Collection Time: 01/20/21  5:20 AM   Result Value Ref Range    WBC 10.9 (H) 4.8 - 10.8 K/uL    RBC 2.43 (L) 4.70 - 6.10 M/uL    Hemoglobin 7.6 (L) 14.0 - 18.0 g/dL    Hematocrit 24.4 (L) 42.0 - 52.0 %    .4 (H) 81.4 - 97.8 fL    MCH 31.3 27.0 - 33.0 pg    MCHC 31.1 (L) 33.7 - 35.3 g/dL    RDW 60.2 (H) 35.9 - 50.0 fL    Platelet Count 357 164 - 446 K/uL    MPV 9.5 9.0 - 12.9 fL   Comp Metabolic Panel    Collection Time: 01/20/21  5:20 AM   Result Value Ref Range     Sodium 135 135 - 145 mmol/L    Potassium 3.9 3.6 - 5.5 mmol/L    Chloride 95 (L) 96 - 112 mmol/L    Co2 28 20 - 33 mmol/L    Anion Gap 12.0 7.0 - 16.0    Glucose 106 (H) 65 - 99 mg/dL    Bun 25 (H) 8 - 22 mg/dL    Creatinine 4.15 (H) 0.50 - 1.40 mg/dL    Calcium 8.6 8.5 - 10.5 mg/dL    AST(SGOT) 10 (L) 12 - 45 U/L    ALT(SGPT) 6 2 - 50 U/L    Alkaline Phosphatase 108 (H) 30 - 99 U/L    Total Bilirubin 0.3 0.1 - 1.5 mg/dL    Albumin 3.1 (L) 3.2 - 4.9 g/dL    Total Protein 5.9 (L) 6.0 - 8.2 g/dL    Globulin 2.8 1.9 - 3.5 g/dL    A-G Ratio 1.1 g/dL   PROCALCITONIN    Collection Time: 01/20/21  5:20 AM   Result Value Ref Range    Procalcitonin 1.57 (H) <0.25 ng/mL   ESTIMATED GFR    Collection Time: 01/20/21  5:20 AM   Result Value Ref Range    GFR If  17 (A) >60 mL/min/1.73 m 2    GFR If Non  14 (A) >60 mL/min/1.73 m 2   ACCU-CHEK GLUCOSE    Collection Time: 01/20/21  7:04 AM   Result Value Ref Range    Glucose - Accu-Ck 114 (H) 65 - 99 mg/dL   ACCU-CHEK GLUCOSE    Collection Time: 01/20/21 11:03 AM   Result Value Ref Range    Glucose - Accu-Ck 174 (H) 65 - 99 mg/dL   ACCU-CHEK GLUCOSE    Collection Time: 01/20/21  4:59 PM   Result Value Ref Range    Glucose - Accu-Ck 139 (H) 65 - 99 mg/dL   ACCU-CHEK GLUCOSE    Collection Time: 01/20/21  8:08 PM   Result Value Ref Range    Glucose - Accu-Ck 169 (H) 65 - 99 mg/dL   ACCU-CHEK GLUCOSE    Collection Time: 01/21/21  7:52 AM   Result Value Ref Range    Glucose - Accu-Ck 135 (H) 65 - 99 mg/dL   ACCU-CHEK GLUCOSE    Collection Time: 01/21/21 11:07 AM   Result Value Ref Range    Glucose - Accu-Ck 123 (H) 65 - 99 mg/dL   ACCU-CHEK GLUCOSE    Collection Time: 01/21/21  5:41 PM   Result Value Ref Range    Glucose - Accu-Ck 84 65 - 99 mg/dL   ACCU-CHEK GLUCOSE    Collection Time: 01/21/21  8:19 PM   Result Value Ref Range    Glucose - Accu-Ck 112 (H) 65 - 99 mg/dL   ACCU-CHEK GLUCOSE    Collection Time: 01/22/21  7:40 AM   Result Value Ref Range     Glucose - Accu-Ck 112 (H) 65 - 99 mg/dL       Current Facility-Administered Medications   Medication Frequency   • senna-docusate (PERICOLACE or SENOKOT S) 8.6-50 MG per tablet 2 Tab BID PRN    And   • magnesium hydroxide (MILK OF MAGNESIA) suspension 30 mL QDAY PRN    And   • bisacodyl (DULCOLAX) suppository 10 mg QDAY PRN   • metoprolol (LOPRESSOR) tablet 12.5 mg DAILY   • insulin regular (HumuLIN R,NovoLIN R) injection 4X/DAY ACHS    And   • dextrose 50% (D50W) injection 50 mL Q15 MIN PRN   • insulin glargine (Lantus) injection QAM INSULIN   • tamsulosin (FLOMAX) capsule 0.8 mg QHS   • gabapentin (NEURONTIN) capsule 300 mg DAILY   • heparin injection 1,800 Units DIALYSIS PRN   • hydrOXYzine HCl (ATARAX) tablet 50 mg Q6HRS PRN   • melatonin tablet 3 mg HS PRN   • Respiratory Therapy Consult Continuous RT   • Pharmacy Consult Request ...Pain Management Review 1 Each PHARMACY TO DOSE   • acetaminophen (Tylenol) tablet 650 mg Q4HRS PRN   • lactulose 20 GM/30ML solution 30 mL QDAY PRN   • docusate sodium (ENEMEEZ) enema 283 mg QDAY PRN   • fleet enema 133 mL QDAY PRN   • artificial tears ophthalmic solution 1 Drop PRN   • benzocaine-menthol (CEPACOL) lozenge 1 Lozenge Q2HRS PRN   • mag hydrox-al hydrox-simeth (MAALOX PLUS ES or MYLANTA DS) suspension 20 mL Q2HRS PRN   • ondansetron (ZOFRAN ODT) dispertab 4 mg 4X/DAY PRN    Or   • ondansetron (ZOFRAN) syringe/vial injection 4 mg 4X/DAY PRN   • traZODone (DESYREL) tablet 50 mg QHS PRN   • sodium chloride (OCEAN) 0.65 % nasal spray 2 Spray PRN   • traMADol (ULTRAM) 50 MG tablet 50 mg Q4HRS PRN   • atorvastatin (LIPITOR) tablet 40 mg Q EVENING   • calcitRIOL (ROCALTROL) capsule 0.25 mcg DAILY   • epoetin (Retacrit) injection (Dialysis use only) 10,000 Units TUE+THU+SAT   • methimazole (TAPAZOLE) tablet 5 mg Q EVENING   • methimazole (TAPAZOLE) tablet 7.5 mg QAM   • omeprazole (PRILOSEC) capsule 20 mg DAILY   • oxyCODONE immediate-release (ROXICODONE) tablet 5 mg Q4HRS PRN    • apixaban (ELIQUIS) tablet 2.5 mg BID       Orders Placed This Encounter   Procedures   • Diet Order Diet: Renal; Second Modifier: (optional): Consistent CHO (Diabetic)     Standing Status:   Standing     Number of Occurrences:   1     Order Specific Question:   Diet:     Answer:   Renal [8]     Order Specific Question:   Second Modifier: (optional)     Answer:   Consistent CHO (Diabetic) [4]       Assessment:  Active Hospital Problems    Diagnosis   • *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)   • Abrasion of penis with infection   • Urinary retention   • Paroxysmal A-fib (HCC)   • End stage renal disease on dialysis (HCC)   • Type 2 diabetes mellitus with kidney complication, with long-term current use of insulin (HCC)   • Essential hypertension   • Anemia   • Hyperthyroidism   • Dyslipidemia   • Abdominal pain   • Leukocytosis   • Orthostatic hypotension   • Hypokalemia   • Impaired mobility and ADLs       Medical Decision Making and Plan:  Right BKA  12/31 Dr. Lynn  Continue full rehab program  PT/OT/SLP, 1 hr each discipline, 5 days per week     Outpatient follow-up for suture removal after discharge with surgery  Outpatient follow-up with Dr. Elliott, referral made     Phantom pain, improved/resolved  Continue gabapentin 300 mg, maximum due to renal failure  Outpatient follow-up with Dr. Elliott, referral made     Encephalopathy, resolved  Speech therapy for cognitive assessment  Currently only with mild memory deficits     ?Depression  Psychology consult, Dr Izaguirre     Urinary retention, continues  Penile pain  ? Balanitis   Has very high residuals  Continue intermittent caths for over 400 cc, patient refusing  Started Flomax 0.4 mg 1/14 --> 0.8 mg 1/16; low blood pressure is 1/22, but hospitalist okay with not stopping Flomax given Tomas removal.  PRN lidocaine jelly for caths  Placed indwelling catheter for bladder rest 1/17; removed 1/22 due to severe discomfort and pain reported by  patient  Referral to urology made, suspect BPH as cause  1/21 extensive discussion with patient regarding need for proper bladder management  1/22 extensive discussion again with patient regarding his symptoms.  Patient reports he is in such severe discomfort he would like to take the Tomas out and will be compliant with intermittent catheterization.  Start miconazole topical cream 1/22 -twice daily x7 days  Urinalysis 1/22  Pericare by nursing  Repeat labs per hospitalist      Bowel program  History of diabetic gastroparesis  Continue bowel medications  Scheduled Sennakot  PRN Miralax, MOM, bisacodyl suppository  Last BM 1/20     DVT prophylaxis  Eliquis     Appreciate the assistance of the hospitalist with his medical comorbidities:     End-stage renal disease on hemodialysis  Consulted nephrology  Continue calcitriol 0.25 mcg daily  Continue Retacrit with dialysis  Continue K-Phos 250 mg 2 times daily     Diabetes with hyperglycemia  Sliding scale insulin  Lantus 5 units daily --> 8 units 1/19     Peripheral artery disease  Continue Eliquis, renal dosing  Continue statin     Coronary artery disease  With history of stents  Continue statin     Paroxysmal atrial fibrillation  Continue Eliquis, renal dosing  Continue metoprolol 37.5 mg twice daily --> dose decreased to 25 mg twice daily --> 12.5 mg BID 1/21--> Stopped 1/22 hypotension     Hypertension  Hypotension  Continue metoprolol 37.5 mg twice daily --> dose decreased to 25 mg twice daily --> 12.5 mg BID 12/21--> Stopped 1/22 hypotension  IVF 1/22     Hyperlipidemia  Continue statin     Hyperthyroidism  Continue methimazole 7.5 mg in the morning, 5 mg in the evening     GERD  Continue omeprazole 20 mg     Anemia  Likely of chronic renal disease  Continue Retacrit  + FOB - GI f/u per Hospitalist rec     Leukocytosis, resolved, then returned  Recently treated with IV antibiotics for pneumonia  Negative urinalysis and chest x-ray  Negative blood cultures  WBC 10.9  1/20, down from 16-day prior.   Monitor     Elevated procalcitonin, improved  Recently treated with IV antibiotics for pneumonia     Penile discharge  Culture with yeast, status post Diflucan  Topical miconazole 1/22 until urinalysis and culture results.       COVID negative 1/11, re-screen 1/17 negative    Total time:  35 minutes.  I spent greater than 50% of the time for patient care and coordination on this date, including unit/floor time, and face-to-face time with the patient as per assessment and plan above.    Bobbi Elliott D.O.

## 2021-01-22 NOTE — THERAPY
Speech Language Pathology  Daily Treatment     Patient Name: Luis Sepulveda  Age:  75 y.o., Sex:  male  Medical Record #: [id number]  Today's Date: 1/22/2021     Precautions  Precautions: Fall Risk, Non Weight Bearing Right Lower Extremity  Comments: R BKA, R IPOP, dialysis Tues, Thurs, Sat, L arm fistula, skin breakdown L heel    Subjective    Pt pleasant and cooperative. Pt in bed with low BP, fluids encouraged at this time. Therapy completed at bedside.      Objective     01/22/21 1403   SLP Total Time Spent   SLP Individual Total Time Spent (Mins) 60   Treatment Charges   SLP Cognitive Skill Development First 15 Minutes 1   SLP Cognitive Skill Development Additional 15 Minutes 3       Assessment    Pt laying in bed due to low BP, verbal tasks presented at this time. Pt presented with verbal memory tasks. Variety of mental manipulation tasks completed with 100% accuracy provided 4 units and 80% accuracy provided 5 units. Pt required repetition to achieve 100% with 5 units. Alternating attention task completed with use of two categories and ABCs pt completed indep with verbal cues X3 on 26 trials.     Strengths: Able to follow instructions, Independent prior level of function  Barriers: Impulsive, Impaired functional cognition, Impaired insight/denial of deficits    Plan    Cont to address recall, attention and executive functioning skills related to meds and finances.     Speech Therapy Problems     Problem: Memory STGs     Dates: Start: 01/14/21       Goal: STG-Within one week, patient will     Dates: Start: 01/14/21       Description: 1) Individualized goal:  complete verbal recall tasks with brief (5-10 minute) delay with 70% accuracy provided MIN cues.   2) Interventions:  SLP Self Care / ADL Training , SLP Cognitive Skill Development, and SLP Group Treatment                Problem: Problem Solving STGs     Dates: Start: 01/14/21       Goal: STG-Within one week, patient will     Dates: Start: 01/14/21        Description: 1) Individualized goal:  complete complex attention tasks involving medication management with 80% accuracy provided SPV.   2) Interventions:  SLP Self Care / ADL Training , SLP Cognitive Skill Development, and SLP Group Treatment                Problem: Speech/Swallowing LTGs     Dates: Start: 01/14/21       Goal: LTG-By discharge, patient will     Dates: Start: 01/14/21       Description: 1) Individualized goal:  complete functional problem solving and recall information with 80% accuracy and MOD I.   2) Interventions:  SLP Self Care / ADL Training , SLP Cognitive Skill Development, and SLP Group Treatment

## 2021-01-23 NOTE — CARE PLAN
Problem: Bowel/Gastric:  Goal: Normal bowel function is maintained or improved  Outcome: PROGRESSING AS EXPECTED  Note: Pt continent of bowel this shift. He was having some loose stools tonight. Will continue to monitor.     Problem: Urinary Elimination:  Goal: Ability to reestablish a normal urinary elimination pattern will improve  Outcome: PROGRESSING AS EXPECTED  Note: Pt's palacios catheter discontinued today. He's voiding adequately in the BR with PVR scans <400ml. He denies dysuria. Will continue to monitor.

## 2021-01-23 NOTE — CONSULTS
"PSYCHOLOGICAL CONSULTATION:  Reason for admission: S/P BKA (below knee amputation) unilateral ([facility]) [Z89.519]  Reason for consult: \"depression?\"  Requesting Physician: Dr. Goodman    Legal status: Legal Status: Voluntary    Chief Complaint: \"Sometimes, you just have to power through it.\"    HPI: Luis Sepulveda is a 75 y.o. male with no known psychiatric history who is currently being treated at [facility] following a below the knee amputation. For more information regarding his medical course and treatment, see medical notes. Consult was placed after concerns arose that the patient may be experiencing symptoms of depression as a result of the change to his physiological functioning.    Today, the patient presented with a euthymic affect and reported an \"ok\" mood. He did not report any suicidal or homicidal thoughts and was very future focused on physical rehabilitation and [place]. He and his family are currently looking in multiple different states for lake house options. He smiled as he spoke about this plan. The patient denied current symptoms of depression. Rather, he stated his belief that while he wasn't \"jumping for huey that I lost part of my leg\", it is something \"I have to deal with.\" He spoke about how he plans to \"power through it\" so that he can continue on with his life. He agreed to meet with this writer on a regular basis while he is in the acute care setting as he does think having \"extra support\" would be helpful.       Psychiatric Review of Systems:current symptoms as reported by pt.  Depression: Denies see hpi  Deysi: Denies see hpi  Anxiety/Panic Attacks: Denies see hpi  PTSD symptom: Denies see hpi  Psychosis: Denies see hpi       Medical Review of Systems: as reported by pt. All systems reviewed. Only those found to be + are noted below. All others are negative.   Neurological:    TBIs: Did not report, nothing " on problem list   SZs:Did not report, nothing on problem list   Strokes:Did not report, nothing on problem list   Other: hx of encephalopathy and altered mental status  Other medical symptoms:   Thyroid:Did not report, nothing on problem list   Diabetes: Endorses   Cardiovascular disease: Endorses    Past Psychiatric Hx: denied    Family Psychiatric Hx: denied    Social Hx:   Social History     Socioeconomic History   • Marital status:      Spouse name: Not on file   • Number of children: Not on file   • Years of education: Not on file   • Highest education level: Some college, no degree   Occupational History   • Not on file   Social Needs   • Financial resource strain: Not hard at all   • Food insecurity     Worry: Never true     Inability: Never true   • Transportation needs     Medical: Yes     Non-medical: Yes   Tobacco Use   • Smoking status: Former Smoker     Packs/day: 1.00     Years: 55.00     Pack years: 55.00     Types: Cigarettes     Quit date: 2014     Years since quittin.0   • Smokeless tobacco: Never Used   Substance and Sexual Activity   • Alcohol use: Not Currently     Frequency: Never     Binge frequency: Never   • Drug use: No   • Sexual activity: Not on file   Lifestyle   • Physical activity     Days per week: 0 days     Minutes per session: 0 min   • Stress: To some extent   Relationships   • Social connections     Talks on phone: More than three times a week     Gets together: More than three times a week     Attends Gnosticist service: Never     Active member of club or organization: No     Attends meetings of clubs or organizations: Never     Relationship status:    • Intimate partner violence     Fear of current or ex partner: Not on file     Emotionally abused: Not on file     Physically abused: Not on file     Forced sexual activity: Not on file   Other Topics Concern   • Not on file   Social History Narrative   • Not on file        Drug/Alcohol/Tobacco Hx:   Drugs:  Did not report, nothing on problem list   Prescription Medication Abuse: Did not report, nothing in chart   Alcohol: Did not report, not currently per chart   Tobacco: Did not report, not currently per chart. former smoker    Medical Hx: Chart reviewed. Only those findings of potential interest to psychiatry are noted below:  Past Medical History:   Diagnosis Date   • Arrhythmia     hx a fib   • Arthritis 12/29/2020    knees, ankles, back   • CAD (coronary artery disease)     stents   • Chronic anticoagulation 3/26/2020   • Chronic kidney disease (CKD), stage V ([facility])    • Congestive heart failure (HCC)     hx of   • Dental disorder 12/29/2020    partial upper   • Diabetes    • Hemodialysis patient (HCC)     Tuesday, Thursday, Saturday   • Hypertension    • Kidney failure    • Myocardial infarct ([facility])     ? 2019    • Osteoarthritis    • Peripheral neuropathy    • Pneumonia     per hx   • Sleep apnea     does not use cpap anymore     Medical Conditions:     Allergies: Mircera [methoxy polyethylene glycol-epoetin beta] and Other drug  Medications (currently prescribed at [facility]):    Current Facility-Administered Medications:   •  senna-docusate (PERICOLACE or SENOKOT S) 8.6-50 MG per tablet 2 Tab, 2 Tab, Oral, BID PRN **AND** magnesium hydroxide (MILK OF MAGNESIA) suspension 30 mL, 30 mL, Oral, QDAY PRN **AND** bisacodyl (DULCOLAX) suppository 10 mg, 10 mg, Rectal, QDAY PRN, Lata Goodman M.D.  •  miconazole 2%-zinc oxide (Dominga) topical cream, , Topical, BID, Bobbi Elliott D.O., Given at 01/22/21 1600  •  omeprazole (PRILOSEC) capsule 20 mg, 20 mg, Oral, BID, Ayla Borrero M.D.  •  insulin regular (HumuLIN R,NovoLIN R) injection, 2-12 Units, Subcutaneous, 4X/DAY ACHS, Stopped at 01/22/21 1700 **AND** POC Blood Glucose, , , Q6H **AND** NOTIFY MD and PharmD, , , Once **AND** dextrose 50% (D50W) injection 50 mL, 50 mL, Intravenous, Q15 MIN PRN, Ayla Borrero M.D.  •  insulin glargine (Lantus) injection, 8 Units,  Subcutaneous, QAM INSULIN, Patrice Pantoja M.D., 8 Units at 01/22/21 0742  •  tamsulosin (FLOMAX) capsule 0.8 mg, 0.8 mg, Oral, QHS, Lata Goodman M.D., 0.8 mg at 01/21/21 2014  •  gabapentin (NEURONTIN) capsule 300 mg, 300 mg, Oral, DAILY, Lata Goodman M.D., 300 mg at 01/22/21 0839  •  heparin injection 1,800 Units, 1,800 Units, Intravenous, DIALYSIS PRN, Boston Tristan M.D., 1,800 Units at 01/21/21 1635  •  hydrOXYzine HCl (ATARAX) tablet 50 mg, 50 mg, Oral, Q6HRS PRN, Lata Goodman M.D., 50 mg at 01/20/21 1959  •  melatonin tablet 3 mg, 3 mg, Oral, HS PRN, Lata Goodman M.D., 3 mg at 01/20/21 2000  •  Respiratory Therapy Consult, , Nebulization, Continuous RT, Lata Goodman M.D.  •  Pharmacy Consult Request ...Pain Management Review 1 Each, 1 Each, Other, PHARMACY TO DOSE, Lata Goodman M.D.  •  acetaminophen (Tylenol) tablet 650 mg, 650 mg, Oral, Q4HRS PRN, Lata Goodman M.D., 650 mg at 01/14/21 0834  •  lactulose 20 GM/30ML solution 30 mL, 30 mL, Oral, QDAY PRN, Lata Goodman M.D.  •  docusate sodium (ENEMEEZ) enema 283 mg, 283 mg, Rectal, QDAY PRN, Lata Goodman M.D.  •  fleet enema 133 mL, 1 Each, Rectal, QDAY PRN, Lata Goodman M.D.  •  artificial tears ophthalmic solution 1 Drop, 1 Drop, Both Eyes, PRN, Lata Goodman M.D.  •  benzocaine-menthol (CEPACOL) lozenge 1 Lozenge, 1 Lozenge, Mouth/Throat, Q2HRS PRN, Lata Goodman M.D.  •  mag hydrox-al hydrox-simeth (MAALOX PLUS ES or MYLANTA DS) suspension 20 mL, 20 mL, Oral, Q2HRS PRN, Lata Goodman M.D., 20 mL at 01/18/21 2216  •  ondansetron (ZOFRAN ODT) dispertab 4 mg, 4 mg, Oral, 4X/DAY PRN, 4 mg at 01/17/21 0635 **OR** ondansetron (ZOFRAN) syringe/vial injection 4 mg, 4 mg, Intramuscular, 4X/DAY PRN, Lata Goodman M.D.  •  traZODone (DESYREL) tablet 50 mg, 50 mg, Oral, QHS PRN, Lata Goodman M.D., 50 mg at 01/20/21 2000  •  sodium chloride (OCEAN) 0.65 % nasal spray 2 Spray, 2  "Spray, Nasal, PRN, Lata Goodman M.D.  •  traMADol (ULTRAM) 50 MG tablet 50 mg, 50 mg, Oral, Q4HRS PRN, Lata Goodman M.D., 50 mg at 01/20/21 2000  •  atorvastatin (LIPITOR) tablet 40 mg, 40 mg, Oral, Q EVENING, Lata Goodman M.D., 40 mg at 01/21/21 2014  •  calcitRIOL (ROCALTROL) capsule 0.25 mcg, 0.25 mcg, Oral, DAILY, Lata Goodman M.D., 0.25 mcg at 01/22/21 0839  •  epoetin (Retacrit) injection (Dialysis use only) 10,000 Units, 10,000 Units, Intravenous, TUE+THU+SAT, Lata Goodman M.D., 10,000 Units at 01/21/21 1634  •  methimazole (TAPAZOLE) tablet 5 mg, 5 mg, Oral, Q EVENING, Lata Goodman M.D., 5 mg at 01/21/21 2015  •  methimazole (TAPAZOLE) tablet 7.5 mg, 7.5 mg, Oral, QAM, Lata Goodman M.D., 7.5 mg at 01/22/21 0839  •  oxyCODONE immediate-release (ROXICODONE) tablet 5 mg, 5 mg, Oral, Q4HRS PRN, Lata Goodman M.D., 5 mg at 01/20/21 1635  •  apixaban (ELIQUIS) tablet 2.5 mg, 2.5 mg, Oral, BID, Lata Goodman M.D., 2.5 mg at 01/22/21 0839  Labs:  Recent Results (from the past 24 hour(s))   ACCU-CHEK GLUCOSE    Collection Time: 01/21/21  5:41 PM   Result Value Ref Range    Glucose - Accu-Ck 84 65 - 99 mg/dL   ACCU-CHEK GLUCOSE    Collection Time: 01/21/21  8:19 PM   Result Value Ref Range    Glucose - Accu-Ck 112 (H) 65 - 99 mg/dL   OCCULT BLOOD STOOL    Collection Time: 01/22/21 12:00 AM   Result Value Ref Range    Occult Blood Feces Negative Negative   ACCU-CHEK GLUCOSE    Collection Time: 01/22/21  7:40 AM   Result Value Ref Range    Glucose - Accu-Ck 112 (H) 65 - 99 mg/dL   ACCU-CHEK GLUCOSE    Collection Time: 01/22/21 11:10 AM   Result Value Ref Range    Glucose - Accu-Ck 153 (H) 65 - 99 mg/dL          Cranial Imaging Hx: 1/7/21 CT of the head for AMS: \"      1.  No acute intracranial abnormality.\"    Psychiatric Examination:  Vitals: Blood pressure 114/72, pulse 62, temperature 36.8 °C (98.3 °F), temperature source Oral, resp. rate 18, height 1.651 m (5' 5\"), " "weight 76.5 kg (168 lb 10.4 oz), SpO2 90 %.  Musculoskeletal: normal psychomotor activity, no tics or unusual mannerisms noted  Appearance and Eye Contact: WDWN, appropriate dress and grooming. Behavior is calm, cooperative,  appropriate eye contact  Attention/Alertness: Alert  Thought Process: Linear, Logical and Goal Directed    Thought Content: No psychotic processes noted  Speech: Clear with normal rate and rhythm  Mood: \"ok\"            Affect: euthymic         SI/HI: Did not report, future oriented    Memory: Recent and remote memory appear intact     Orientation: alert, oriented to person, place and time  Insight into symptoms: good  Judgement into symptoms:good    Neuropsychological Testing:   Not formally tested.    SLP Cognitive Testing on 1/14/21:    \"Cognitive assessment completed at this time with use of SCCAN. Pt noted to be hyper verbose and required redirection. Pt completed test with a raw score of 83 achieved which indicates MILD cognitive impairments. Pt with moderate to severe deficit in area of short term memory recall (verbal) and mild deficit in area of attention. Pt very aware of cognitive deficits and often demonstrating frequent negative self talk. Pt would benefit from brief cognitive intervention to address functional memory strategies, ensure attention and understanding of current medications with goal for pt to remain indep as for that was his PLOF.\"         ASSESSMENT: Currently, the patient does not meet criteria for a depressive disorder nor does he endorse feeling like he is depressed. He does recognize that he is in the beginning of emotionally adjusting to his new physiological state however this does not seem to currently be impeding his recovery. It appears the his prior mood fluctuations may have been related to the pain he was experiencing with his catheter which has since been removed. He may also be starting to adjust and cope on his own. Nevertheless, he is interested in " brief psychotherapy to help him continue coping with the stress of recovery and dialysis. Will meet on a regular basis while he is in the rehabilitation hospital setting.     DSM5 Diagnostic Considerations:   Unspecified Adjustment Disorder (very mild)      PLAN:  Legal status: Voluntary  Anticipate F/U within 48 hours.   Records reviewed: yes  Will continue to follow  Thank you for the consult.    Melinda Izaguirre, Ph.D.

## 2021-01-23 NOTE — PROGRESS NOTES
[facility] Services Progress Note     HD treatment ordered by NIALL Lo x 3.5 hours.  Treatment Start time: 1002          End time: 1238       Net UF 1300 ml    HD treatment terminated early (54 mins left on TX) due to pt having diarrhea and HD needle infiltrated on arterial site as patient reposition for cleaning. Left arm pain and swelling around site. Nephrologist and 1RN aware.  VS stable all through out. All blood was returned. LUE AVF needle removed. Dry gauze dressing applied and changed without bleeding issue. + Bruit/Thrill pre-post Treatment.     See paper flow sheet for details.       Report given to JULIEN Trujillo RN.

## 2021-01-23 NOTE — CONSULTS
"BRIEF PSYCHIATRIC CONSULT NOTE: patient seen and assessed, Affect was euthymic. Mood was stated as \"ok.\" No suicidal or homicidal ideations noted. He is very future focused on [place] time [place] He is motivated to do treatment. The patient denied current and active symptoms of depression however he did acknowledge that he was in the process of emotionally adjusting to his new physiological functioning. He agreed to meet with this writer on a weekly basis for brief psychotherapy focused on maintaining motivation for recovery.  full note to follow.  -Legal Hold:not indicated  -Trauma and Stressor Related Disorder  -Will see at least once a week while in the rehabilitation setting               "

## 2021-01-23 NOTE — PROGRESS NOTES
"[facility] Nephrology Progress Note    Author: Hui ECKERT  Collaborating Physician: Marvin Mahajan MD    Date of Service  1/23/2021    Chief Complaint  A 75-year-old male with end-stage renal disease   on chronic hemodialysis Tuesdays, Thursdays and Saturdays.  The patient was   admitted on 12/31 for right below-the-knee amputation.  He had wound and   gangrene of the right foot.  He has had some peripheral vascular procedures   previously.  He did have a right ray amputation in 11/2020.  He has had a   nonhealing wound and he was admitted for right below-the-knee amputation that   was performed on 12/31.  We have been consulted to assist in his dialysis   needs.  He had dialysis on 12/31 prior to admission.  Today, he is feeling   dyspneic.  He has no cough or sputum production.    Daily Nephrology Summary   1/13- Transferred to Rehab  1/14 - Pt sitting up in W/C in room, denies any complaints/concerns, due for HD today, BP labile, K+ 3.1, per chart review-penile discharge cx sent  1/15 - Pt getting up to restroom to shower with assistance, HD yesterday with 2.1L UF, K+ corrected to 3.6, iron stores replete, no growth on penile wound cx, VSS on RA   1/16 - No overnight events, HD later today.  No labs for review.  Sitting up at bedside eating lunch.  No complaints.   1/18 - No new events. Pt with palacios. C/O GI issues, bowels are \"loose\". No new events.   1/19 - HD today. VSS. No new events.Pt seen during therapy. C/O about palacios catheter.  1/20 - HD yest, UF 1.7L. Occult blood stool +. CXR improved. Abd xray no obstruction. Abd pain improved after 2 large BMs. C/O palacios catheter.  1/21 - Voiding trial on Mon. Due for HD today. Sleepy today 2/2 sleeping pill last hs, slept very well.  1/21 - Pt no longer with abd pain. C/O \"sore bottom\" and palacios catheter discomfort.  1/22 - Due to HD this am. Pt feels pretty good. Palacios removed. Pt denies pain but more discomfort once he gets up moving around. No abd " discomfort this am.    Review of Systems  Review of Systems   Constitutional: Positive for malaise/fatigue. Negative for chills and fever.   HENT: Negative.    Eyes: Negative.    Respiratory: Negative.    Cardiovascular: Negative.    Gastrointestinal: Negative.  Negative for abdominal pain, nausea and vomiting.   Genitourinary:        Tomas catheter   Musculoskeletal: Positive for joint pain. Negative for back pain and myalgias.        C/O RLE phantom pain   Skin: Negative.    Neurological: Negative.    Endo/Heme/Allergies: Negative.    Psychiatric/Behavioral: Negative.       Physical Exam  Temp:  [36.6 °C (97.9 °F)-36.8 °C (98.3 °F)] 36.8 °C (98.3 °F)  Pulse:  [62-95] 89  Resp:  [17-18] 18  BP: ()/(39-72) 122/68  SpO2:  [90 %-96 %] 96 %    Physical Exam  Constitutional:       Appearance: Normal appearance.   HENT:      Head: Normocephalic and atraumatic.      Nose: Nose normal.      Mouth/Throat:      Mouth: Mucous membranes are moist.      Pharynx: Oropharynx is clear.   Eyes:      Extraocular Movements: Extraocular movements intact.      Pupils: Pupils are equal, round, and reactive to light.   Neck:      Musculoskeletal: Normal range of motion and neck supple.   Cardiovascular:      Rate and Rhythm: Normal rate and regular rhythm.      Pulses: Normal pulses.      Comments: L AVF +T/B  Pulmonary:      Effort: Pulmonary effort is normal.      Breath sounds: Normal breath sounds.   Abdominal:      General: Bowel sounds are normal.      Palpations: Abdomen is soft.   Musculoskeletal:         General: No deformity.      Comments: R BKA    Skin:     General: Skin is warm and dry.   Neurological:      General: No focal deficit present.      Mental Status: He is alert and oriented to person, place, and time.   Psychiatric:         Mood and Affect: Mood normal.         Behavior: Behavior normal.         Fluids    Intake/Output Summary (Last 24 hours) at 1/23/2021 0954  Last data filed at 1/23/2021 0900  Gross per  24 hour   Intake 480 ml   Output 500 ml   Net -20 ml       Laboratory  Recent Labs     01/23/21  0454   WBC 8.7   RBC 2.46*   HEMOGLOBIN 7.8*   HEMATOCRIT 24.7*   .4*   MCH 31.7   MCHC 31.6*   RDW 58.5*   PLATELETCT 323   MPV 9.2     Recent Labs     01/23/21  0454   SODIUM 133*   POTASSIUM 4.0   CHLORIDE 94*   CO2 25   GLUCOSE 94   BUN 35*   CREATININE 5.57*   CALCIUM 8.8         No results for input(s): NTPROBNP in the last 72 hours.        Imaging  Available labs, imaging and clinical documentation reviewed.     Assessment/Plan  #  End-stage renal disease, on chronic hemodialysis qTTS  - Normally dialyzes at [facility] via L AVF   #  Status post right BKA on 12/31/2020 for diabetic ulcer.  #  Hypertension, controlled  - On metoprolol   #  Anemia: superimposed blood loss in addition to chronic kidney disease.  - Hgb below target   - On ARMANDO   - Iron replete, ferritin elevated   #  Diabetes mellitus.  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  - On calcitriol and calcium acetate   - Calcium acetate currently being held   - Vit D 48   - Phos 4.1  - iPTH 109   #  Peripheral vascular disease.   #  Paroxysmal atrial fibrillation, on Eliquis and metoprolol.   #  Coronary artery disease, status post stents.  Asymptomatic.  #  History of hyperthyroidism. On methimazole.   #  Dyslipidemia, on statin therapy.   #  Past history of heavy tobacco use.  #  Pneumonia: s/p IV Zosyn   #  Encephalopathy: resolving  #  Weakness/debility  #  Urinary retention requiring catheter placement  - Chronic oliguria  #  Hypokalemia, corrected  #  Hyponatremia in ESRD, mild, improved  #  Penile meatus wound  - Tomas  - NGTD on cx      PLAN:  -Hemodialysis today  -Continue hemodialysis qTTS/PRN  -UF as tolerated  -ARMANDO TIW with HD, goal Hgb 10-11   -Transfuse PRN Hgb <7  -Goal BP <140/90  -No dietary protein restrictions  -Dose meds for ESRD  -Limit narcotics/sedating meds  -PT/OT/Speech  -May need urology consult for urinary  retention  -Voiding trial on Monday    Thank you,

## 2021-01-24 NOTE — THERAPY
"Occupational Therapy  Daily Treatment     Patient Name: Luis Sepulveda  Age:  75 y.o., Sex:  male  Medical Record #: [id number]  Today's Date: 1/24/2021     Precautions  Precautions: (P) Fall Risk, Non Weight Bearing Right Lower Extremity  Comments: (P) R BKA, R IPOP, dialysis Tues, Thurs, Sat, L arm fistula, skin breakdown L heel    Safety   ADL Safety : Requires Supervision for Safety  Bathroom Safety: Requires Supervision for Safety  Comments: See below notes for ADL performance details.    Subjective    \"My grandson goes to [facility] near by but he will be done soon and will be able to assist me at home if I need it.\"      Objective       01/24/21 0931   Precautions   Precautions Fall Risk;Non Weight Bearing Right Lower Extremity   Comments R BKA, R IPOP, dialysis Tues, Thurs, Sat, L arm fistula, skin breakdown L heel   Bed Mobility    Sit to Supine Stand by Assist   Scooting Supervised   Rolling Supervised   Interdisciplinary Plan of Care Collaboration   Patient Position at End of Therapy In Bed;Call Light within Reach;Tray Table within Reach;Phone within Reach   OT Total Time Spent   OT Individual Total Time Spent (Mins) 30   OT Charge Group   OT Therapy Activity 2       Assessment    Pt tolerated session well. Pt motivated to look at shower set-up in bathroom and discuss options for adapting his shower at home for safety. Pt wheeled himself down to therapy shower and therapist demonstrated various ways he could get in/out of walk-in shower with lip from w/c level and using walker. Discussed pt's shower set-up and places he could get a grab bar. Pt discussed possibly getting a transfer bar outside shower if needed. Pt required increased time and min a to don IPOP at start of session, pt not wearing it in room, encouraged pt to wear it at all times to prevent knee flexion and encourage knee extension.     Strengths: Alert and oriented, Effective communication skills, Independent prior level of function, " Making steady progress towards goals, Pleasant and cooperative, Supportive family, Willingly participates in therapeutic activities  Barriers: Decreased endurance, Fatigue, Impaired balance(safety awareness)    Plan    ADLs, IADLs and related functional mobility, Threshold shower transfers, BUE/Core strength, standing mike/balance.     Occupational Therapy Goals     Problem: Dressing     Dates: Start: 01/14/21       Goal: STG-Within one week, patient will dress LB     Dates: Start: 01/14/21       Description: 1) Individualized Goal:  with Min A and AE as needed  2) Interventions:  OT Group Therapy, OT Self Care/ADL, OT Cognitive Skill Dev, OT Community Reintegration, OT Manual Ther Technique, OT Neuro Re-Ed/Balance, OT Therapeutic Activity, OT Evaluation, and OT Therapeutic Exercise          Note:     Goal Note filed on 01/18/21 1149 by Romy Collins, OT    Requires max A.                         Problem: Functional Transfers     Dates: Start: 01/14/21       Goal: STG-Within one week, patient will transfer to toilet     Dates: Start: 01/14/21       Description: 1) Individualized Goal:  with CGA and AE as needed  2) Interventions:  OT Group Therapy, OT Self Care/ADL, OT Cognitive Skill Dev, OT Community Reintegration, OT Manual Ther Technique, OT Neuro Re-Ed/Balance, OT Therapeutic Activity, OT Evaluation, and OT Therapeutic Exercise       Note:     Goal Note filed on 01/18/21 1149 by Romy Collins, OT    Min A to sba.                   Goal: STG-Within one week, patient will transfer to tub/shower     Dates: Start: 01/14/21       Description: 1) Individualized Goal:  with CGA and AE as needed  2) Interventions:  OT Group Therapy, OT Self Care/ADL, OT Cognitive Skill Dev, OT Community Reintegration, OT Manual Ther Technique, OT Neuro Re-Ed/Balance, OT Therapeutic Activity, OT Evaluation, and OT Therapeutic Exercise    Note:     Goal Note filed on 01/18/21 1149 by Romy Collins OT    To be addressed.                          Problem: OT Long Term Goals     Dates: Start: 01/14/21       Goal: LTG-By discharge, patient will complete basic self care tasks     Dates: Start: 01/14/21       Description: 1) Individualized Goal:  with mod I and AE as needed  2) Interventions:  OT Group Therapy, OT Self Care/ADL, OT Cognitive Skill Dev, OT Community Reintegration, OT Manual Ther Technique, OT Neuro Re-Ed/Balance, OT Therapeutic Activity, OT Evaluation, and OT Therapeutic Exercise      Note:     Goal Note filed on 01/14/21 1519 by Zeina Taylor MS,OTR/L    1) Individualized Goal:  with mod I and AE as needed  2) Interventions:  OT Group Therapy, OT Self Care/ADL, OT Cognitive Skill Dev, OT Community Reintegration, OT Manual Ther Technique, OT Neuro Re-Ed/Balance, OT Therapeutic Activity, OT Evaluation, and OT Therapeutic Exercise                   Goal: LTG-By discharge, patient will perform bathroom transfers     Dates: Start: 01/14/21       Description: 1) Individualized Goal:  with mod I and AE as needed  2) Interventions:  OT Group Therapy, OT Self Care/ADL, OT Cognitive Skill Dev, OT Community Reintegration, OT Manual Ther Technique, OT Neuro Re-Ed/Balance, OT Therapeutic Activity, OT Evaluation, and OT Therapeutic Exercise       Note:     Goal Note filed on 01/14/21 1519 by Zeina Taylor MS,OTR/L    1) Individualized Goal:  with mod I and AE as needed  2) Interventions:  OT Group Therapy, OT Self Care/ADL, OT Cognitive Skill Dev, OT Community Reintegration, OT Manual Ther Technique, OT Neuro Re-Ed/Balance, OT Therapeutic Activity, OT Evaluation, and OT Therapeutic Exercise

## 2021-01-24 NOTE — PROGRESS NOTES
"Rehab Progress Note     Encounter Date: 1/24/2021    CC: Difficulty urinating    Interval Events (Subjective)  The patient was seen in his bed today.  He reports that he has had difficulty urinating.  He reports that he sometimes feels the urge to urinate, but has difficulty urinating.  No abdominal pain.      From discussion with his nurse, she reports that he was not able to complete dialysis.  He has not been able to void for the last day.  No significant pain with this.  He reports that he continues to make urine in approximately 100-200cc volumes several times a day at his baseline, while on dialysis.    14 point ROS reviewed and negative except as stated above.     Objective:  VITAL SIGNS: /79   Pulse 91   Temp 36.7 °C (98 °F) (Oral)   Resp 18   Ht 1.651 m (5' 5\")   Wt 79.5 kg (175 lb 4.3 oz)   SpO2 95%   BMI 29.17 kg/m²     GEN: No apparent distress, resting in bed  HEENT: Normocephalic, atraumatic.   CV: Regular rate and rhythm, extremities warm and well-perfused, no peripheral edema appreciated bilaterally.  PULMONARY: Breathing nonlabored on room air, no respiratory accessory muscle use.  Not requiring supplemental oxygen.  ABD: Soft, nontender, nondistended, no suprapubic tenderness  MSK: Right amputation appreciated with stump protector  NEURO: Awake alert.  Conversational.  Logical thought content.  PSYCH: Mood and affect within normal limits.  EXT: No right calf tenderness, BKA on the right     Recent Results (from the past 72 hour(s))   ACCU-CHEK GLUCOSE    Collection Time: 01/21/21  5:41 PM   Result Value Ref Range    Glucose - Accu-Ck 84 65 - 99 mg/dL   ACCU-CHEK GLUCOSE    Collection Time: 01/21/21  8:19 PM   Result Value Ref Range    Glucose - Accu-Ck 112 (H) 65 - 99 mg/dL   OCCULT BLOOD STOOL    Collection Time: 01/22/21 12:00 AM   Result Value Ref Range    Occult Blood Feces Negative Negative   ACCU-CHEK GLUCOSE    Collection Time: 01/22/21  7:40 AM   Result Value Ref Range    " Glucose - Accu-Ck 112 (H) 65 - 99 mg/dL   ACCU-CHEK GLUCOSE    Collection Time: 01/22/21 11:10 AM   Result Value Ref Range    Glucose - Accu-Ck 153 (H) 65 - 99 mg/dL   ACCU-CHEK GLUCOSE    Collection Time: 01/22/21  5:14 PM   Result Value Ref Range    Glucose - Accu-Ck 107 (H) 65 - 99 mg/dL   URINALYSIS    Collection Time: 01/22/21  5:30 PM   Result Value Ref Range    Color Yellow     Character Clear     Specific Gravity 1.009 <1.035    Ph 8.5 (A) 5.0 - 8.0    Glucose 100 (A) Negative mg/dL    Ketones Negative Negative mg/dL    Protein 100 (A) Negative mg/dL    Bilirubin Negative Negative    Urobilinogen, Urine 0.2 Negative    Nitrite Negative Negative    Leukocyte Esterase Trace (A) Negative    Occult Blood Small (A) Negative    Micro Urine Req Microscopic    URINE MICROSCOPIC (W/UA)    Collection Time: 01/22/21  5:30 PM   Result Value Ref Range    WBC 10-20 (A) /hpf    RBC 2-5 (A) /hpf    Bacteria Negative None /hpf    Epithelial Cells Few /hpf    Epithelial Cells Renal Moderate (A) /hpf    Hyaline Cast 0-2 /lpf   ACCU-CHEK GLUCOSE    Collection Time: 01/22/21  8:16 PM   Result Value Ref Range    Glucose - Accu-Ck 151 (H) 65 - 99 mg/dL   CBC WITHOUT DIFFERENTIAL    Collection Time: 01/23/21  4:54 AM   Result Value Ref Range    WBC 8.7 4.8 - 10.8 K/uL    RBC 2.46 (L) 4.70 - 6.10 M/uL    Hemoglobin 7.8 (L) 14.0 - 18.0 g/dL    Hematocrit 24.7 (L) 42.0 - 52.0 %    .4 (H) 81.4 - 97.8 fL    MCH 31.7 27.0 - 33.0 pg    MCHC 31.6 (L) 33.7 - 35.3 g/dL    RDW 58.5 (H) 35.9 - 50.0 fL    Platelet Count 323 164 - 446 K/uL    MPV 9.2 9.0 - 12.9 fL   Basic Metabolic Panel    Collection Time: 01/23/21  4:54 AM   Result Value Ref Range    Sodium 133 (L) 135 - 145 mmol/L    Potassium 4.0 3.6 - 5.5 mmol/L    Chloride 94 (L) 96 - 112 mmol/L    Co2 25 20 - 33 mmol/L    Glucose 94 65 - 99 mg/dL    Bun 35 (H) 8 - 22 mg/dL    Creatinine 5.57 (HH) 0.50 - 1.40 mg/dL    Calcium 8.8 8.5 - 10.5 mg/dL    Anion Gap 14.0 7.0 - 16.0    ESTIMATED GFR    Collection Time: 01/23/21  4:54 AM   Result Value Ref Range    GFR If  12 (A) >60 mL/min/1.73 m 2    GFR If Non African American 10 (A) >60 mL/min/1.73 m 2   ACCU-CHEK GLUCOSE    Collection Time: 01/23/21  7:17 AM   Result Value Ref Range    Glucose - Accu-Ck 107 (H) 65 - 99 mg/dL   ACCU-CHEK GLUCOSE    Collection Time: 01/23/21 11:25 AM   Result Value Ref Range    Glucose - Accu-Ck 152 (H) 65 - 99 mg/dL   ACCU-CHEK GLUCOSE    Collection Time: 01/23/21  5:05 PM   Result Value Ref Range    Glucose - Accu-Ck 153 (H) 65 - 99 mg/dL   ACCU-CHEK GLUCOSE    Collection Time: 01/23/21  9:15 PM   Result Value Ref Range    Glucose - Accu-Ck 160 (H) 65 - 99 mg/dL   ACCU-CHEK GLUCOSE    Collection Time: 01/24/21  7:19 AM   Result Value Ref Range    Glucose - Accu-Ck 105 (H) 65 - 99 mg/dL       Current Facility-Administered Medications   Medication Frequency   • fluconazole (DIFLUCAN) tablet 100 mg DAILY   • senna-docusate (PERICOLACE or SENOKOT S) 8.6-50 MG per tablet 2 Tab BID PRN    And   • magnesium hydroxide (MILK OF MAGNESIA) suspension 30 mL QDAY PRN    And   • bisacodyl (DULCOLAX) suppository 10 mg QDAY PRN   • miconazole 2%-zinc oxide (Dominga) topical cream BID   • omeprazole (PRILOSEC) capsule 20 mg BID   • insulin regular (HumuLIN R,NovoLIN R) injection 4X/DAY ACHS    And   • dextrose 50% (D50W) injection 50 mL Q15 MIN PRN   • insulin glargine (Lantus) injection QAM INSULIN   • tamsulosin (FLOMAX) capsule 0.8 mg QHS   • gabapentin (NEURONTIN) capsule 300 mg DAILY   • heparin injection 1,800 Units DIALYSIS PRN   • hydrOXYzine HCl (ATARAX) tablet 50 mg Q6HRS PRN   • melatonin tablet 3 mg HS PRN   • Respiratory Therapy Consult Continuous RT   • Pharmacy Consult Request ...Pain Management Review 1 Each PHARMACY TO DOSE   • acetaminophen (Tylenol) tablet 650 mg Q4HRS PRN   • lactulose 20 GM/30ML solution 30 mL QDAY PRN   • docusate sodium (ENEMEEZ) enema 283 mg QDAY PRN   • fleet enema 133  mL QDAY PRN   • artificial tears ophthalmic solution 1 Drop PRN   • benzocaine-menthol (CEPACOL) lozenge 1 Lozenge Q2HRS PRN   • mag hydrox-al hydrox-simeth (MAALOX PLUS ES or MYLANTA DS) suspension 20 mL Q2HRS PRN   • ondansetron (ZOFRAN ODT) dispertab 4 mg 4X/DAY PRN    Or   • ondansetron (ZOFRAN) syringe/vial injection 4 mg 4X/DAY PRN   • traZODone (DESYREL) tablet 50 mg QHS PRN   • sodium chloride (OCEAN) 0.65 % nasal spray 2 Spray PRN   • traMADol (ULTRAM) 50 MG tablet 50 mg Q4HRS PRN   • atorvastatin (LIPITOR) tablet 40 mg Q EVENING   • calcitRIOL (ROCALTROL) capsule 0.25 mcg DAILY   • epoetin (Retacrit) injection (Dialysis use only) 10,000 Units TUE+THU+SAT   • methimazole (TAPAZOLE) tablet 5 mg Q EVENING   • methimazole (TAPAZOLE) tablet 7.5 mg QAM   • oxyCODONE immediate-release (ROXICODONE) tablet 5 mg Q4HRS PRN   • apixaban (ELIQUIS) tablet 2.5 mg BID       Orders Placed This Encounter   Procedures   • Diet Order Diet: Renal; Second Modifier: (optional): Consistent CHO (Diabetic)     Standing Status:   Standing     Number of Occurrences:   1     Order Specific Question:   Diet:     Answer:   Renal [8]     Order Specific Question:   Second Modifier: (optional)     Answer:   Consistent CHO (Diabetic) [4]       Assessment:  Active Hospital Problems    Diagnosis   • *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)   • Abrasion of penis with infection   • Urinary retention   • Paroxysmal A-fib (HCC)   • End stage renal disease on dialysis (HCC)   • Type 2 diabetes mellitus with kidney complication, with long-term current use of insulin (HCC)   • Essential hypertension   • Anemia   • Hyperthyroidism   • Dyslipidemia   • Abdominal pain   • Leukocytosis   • Orthostatic hypotension   • Hypokalemia   • Impaired mobility and ADLs       Medical Decision Making and Plan:  Right BRANNON  12/31 Dr. Lynn  Continue full rehab program  PT/OT/SLP, 1 hr each discipline, 5 days per week  Outpatient follow-up  for suture removal after discharge with surgery  Outpatient follow-up with Dr. Elliott, referral made     Phantom pain, improved/resolved  Continue gabapentin 300 mg daily, maximum due to renal failure  Outpatient follow-up with Dr. Elliott, referral made     Encephalopathy, resolved  Speech therapy for cognitive assessment  Currently only with mild memory deficits     ?Depression  Psychology consult, Dr Izaguirre     Urinary retention, continues  Penile pain  ? Balanitis   Has very high residuals  Continue intermittent caths for over 400 cc, patient refusing  Started Flomax 0.4 mg 1/14 --> 0.8 mg 1/16; low blood pressure is 1/22, but hospitalist okay with not stopping Flomax given Tomas removal.  PRN lidocaine jelly for caths  Placed indwelling catheter for bladder rest 1/17; removed 1/22 due to severe discomfort and pain reported by patient  Referral to urology made, suspect BPH as cause  1/21 extensive discussion with patient regarding need for proper bladder management  1/22 extensive discussion again with patient regarding his symptoms.  Patient reports he is in such severe discomfort he would like to take the Tomas out and will be compliant with intermittent catheterization.  Start miconazole topical cream 1/22 -twice daily x7 days.  Repeat course of fluconazole per Hospitalist.  Fluconazole 100mg 1/23-1/28  Urinalysis 1/22, epithelial cells noted, small amount of blood.  Culture sent on U/A, although unclear significance.  Pericare by nursing  Repeat labs per hospitalist      Bowel program  History of diabetic gastroparesis  Continue bowel medications  Scheduled Sennakot  PRN Miralax, MOM, bisacodyl suppository  Last BM 1/24     DVT prophylaxis  Eliquis 2.5mg po bid     Appreciate the assistance of the hospitalist with his medical comorbidities:     End-stage renal disease on hemodialysis  Consulted nephrology  Continue calcitriol 0.25 mcg daily  Continue Retacrit with dialysis  Continue K-Phos 250 mg 2 times  daily     Diabetes with hyperglycemia  Sliding scale insulin  Lantus 5 units daily --> 8 units 1/19     Peripheral artery disease  Continue Eliquis, renal dosing at 2.5mg po bid  Continue Lipitor 40mg po q evening     Coronary artery disease  With history of stents  Continue statin     Paroxysmal atrial fibrillation  Continue Eliquis, renal dosing  Continue metoprolol 37.5 mg twice daily --> dose decreased to 25 mg twice daily --> 12.5 mg BID 1/21--> Stopped 1/22 hypotension     Hypertension  Hypotension  Continue metoprolol 37.5 mg twice daily --> dose decreased to 25 mg twice daily --> 12.5 mg BID 12/21--> Stopped 1/22 hypotension  IVF 1/22     Hyperlipidemia  Continue statin     Hyperthyroidism  Continue methimazole 7.5 mg in the morning, 5 mg in the evening     GERD  Continue omeprazole 20 mg     Anemia  Likely of chronic renal disease  Continue Retacrit  + FOB - GI f/u per Hospitalist rec     Leukocytosis, resolved, then returned  Recently treated with IV antibiotics for pneumonia  Negative urinalysis and chest x-ray  Negative blood cultures  WBC 10.9 1/20, down from 16-day prior.   Monitor     Elevated procalcitonin, improved  Recently treated with IV antibiotics for pneumonia     Penile discharge  Culture with yeast, status post Diflucan  Topical miconazole 1/22 until urinalysis and culture results.  1/24 culture pending, continue fluconozole       COVID negative 1/11, re-screen 1/17 negative      Cory Brito M.D.

## 2021-01-24 NOTE — PROGRESS NOTES
Hospital Medicine Daily Progress Note      Chief Complaint:  Hypertension  Afib  Diabetes  Leukocytosis  Penile wound    Interval History:  Low blood pressures improved off B-Bl.  Labs reviewed.     Review of Systems  Review of Systems   Constitutional: Negative for chills and fever.   HENT: Negative.    Eyes: Negative.    Respiratory: Negative for cough and shortness of breath.    Cardiovascular: Negative for chest pain and palpitations.   Gastrointestinal: Negative for abdominal pain, nausea and vomiting.   Musculoskeletal:        Wound pain    Skin: Negative for itching and rash.   Endo/Heme/Allergies: Negative for polydipsia. Does not bruise/bleed easily.        Physical Exam  Temp:  [36.6 °C (97.9 °F)-37.1 °C (98.7 °F)] 37 °C (98.6 °F)  Pulse:  [87-90] 87  Resp:  [17-18] 18  BP: (102-135)/(44-76) 135/76  SpO2:  [95 %-98 %] 97 %    Physical Exam  Vitals signs reviewed.   Constitutional:       General: He is not in acute distress.     Appearance: Normal appearance. He is not ill-appearing.   HENT:      Head: Normocephalic and atraumatic.      Right Ear: External ear normal.      Left Ear: External ear normal.      Nose: Nose normal.      Mouth/Throat:      Pharynx: Oropharynx is clear.   Eyes:      General:         Right eye: No discharge.         Left eye: No discharge.      Extraocular Movements: Extraocular movements intact.      Conjunctiva/sclera: Conjunctivae normal.   Neck:      Musculoskeletal: Normal range of motion and neck supple.   Cardiovascular:      Rate and Rhythm: Normal rate and regular rhythm.   Pulmonary:      Effort: No respiratory distress.      Breath sounds: No wheezing.      Comments: Decreased BS   Abdominal:      General: Bowel sounds are normal. There is no distension.      Palpations: Abdomen is soft.      Tenderness: There is no abdominal tenderness.   Musculoskeletal:      Left lower leg: No edema.      Comments: Right BKA in stump protector   Skin:     General: Skin is warm and  dry.   Neurological:      Mental Status: He is alert and oriented to person, place, and time.         Fluids    Intake/Output Summary (Last 24 hours) at 1/23/2021 1710  Last data filed at 1/23/2021 1245  Gross per 24 hour   Intake 980 ml   Output 1800 ml   Net -820 ml       Laboratory  Recent Labs     01/23/21  0454   WBC 8.7   RBC 2.46*   HEMOGLOBIN 7.8*   HEMATOCRIT 24.7*   .4*   MCH 31.7   MCHC 31.6*   RDW 58.5*   PLATELETCT 323   MPV 9.2     Recent Labs     01/23/21  0454   SODIUM 133*   POTASSIUM 4.0   CHLORIDE 94*   CO2 25   GLUCOSE 94   BUN 35*   CREATININE 5.57*   CALCIUM 8.8                 Assessment/Plan  * 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)- (present on admission)  Assessment & Plan  2/2 PVD and DM with non-healing wound and gangrene  S/P right BKA on 12/31/20 by Dr. Lynn  Wound care and pain control    Abrasion of penis with infection- (present on admission)  Assessment & Plan  Thought to be 2/2 Tomas trauma  Wound Cx +yeast  Completed Fluconazole  F/U UA 10-20 WBC w/ negative bacteria  Now clinically appears to have balanitis  Will give another course of Fluconazole    Paroxysmal A-fib ([facility])- (present on admission)  Assessment & Plan  Rate controlled on Metoprolol  Anticoagulated on Eliquis    End stage renal disease on dialysis ([facility])- (present on admission)  Assessment & Plan  On maintenance HD TTS per [facility]    Type 2 diabetes mellitus with kidney complication, with long-term current use of insulin ([facility])- (present on admission)  Assessment & Plan  HbA1c 5.2  Serum glucose controlled on Lantus and SSI  Outpt meds include Lantus 5 units qhs    Anemia- (present on admission)  Assessment & Plan  Has macrocytic indices  Fe, Vit B12, Folate, and TSH levels all normal  On Epoetin per Nephrology  FOB+, increased PPI to bid  Suggest GI F/U  Follow H/H    Essential hypertension- (present on admission)  Assessment & Plan  Discontinued Metoprolol for  refractory hypotension  Monitor for orthostatic hypotension on Flomax, consider decreasing dose    Hyperthyroidism- (present on admission)  Assessment & Plan  Euthyroid on Methimazole    Dyslipidemia- (present on admission)  Assessment & Plan  On Lipitor    DNR

## 2021-01-24 NOTE — CARE PLAN
Problem: Bowel/Gastric:  Goal: Normal bowel function is maintained or improved  Outcome: PROGRESSING AS EXPECTED     Problem: Pain Management  Goal: Pain level will decrease to patient's comfort goal  Outcome: PROGRESSING AS EXPECTED     Problem: Urinary Elimination:  Goal: Ability to reestablish a normal urinary elimination pattern will improve  Outcome: PROGRESSING SLOWER THAN EXPECTED

## 2021-01-24 NOTE — PROGRESS NOTES
Hospital Medicine Daily Progress Note      Chief Complaint:  Hypertension  Afib  Diabetes  Leukocytosis  Penile wound    Interval History:  No overnight problems.    Review of Systems  Review of Systems   Constitutional: Negative for chills and fever.   HENT: Negative.    Eyes: Negative.    Respiratory: Negative for cough and shortness of breath.    Cardiovascular: Negative for chest pain and palpitations.   Gastrointestinal: Negative for abdominal pain, nausea and vomiting.   Musculoskeletal:        Wound pain    Skin: Negative for itching and rash.   Endo/Heme/Allergies: Negative for polydipsia. Does not bruise/bleed easily.        Physical Exam  Temp:  [36.7 °C (98 °F)-36.8 °C (98.2 °F)] 36.7 °C (98 °F)  Pulse:  [78-91] 85  Resp:  [16-18] 18  BP: ()/(40-79) 108/40  SpO2:  [95 %-99 %] 99 %    Physical Exam  Vitals signs reviewed.   Constitutional:       General: He is not in acute distress.     Appearance: Normal appearance. He is not ill-appearing.   HENT:      Head: Normocephalic and atraumatic.      Right Ear: External ear normal.      Left Ear: External ear normal.      Nose: Nose normal.      Mouth/Throat:      Pharynx: Oropharynx is clear.   Eyes:      General:         Right eye: No discharge.         Left eye: No discharge.      Extraocular Movements: Extraocular movements intact.      Conjunctiva/sclera: Conjunctivae normal.   Neck:      Musculoskeletal: Normal range of motion and neck supple.   Cardiovascular:      Rate and Rhythm: Normal rate and regular rhythm.   Pulmonary:      Effort: No respiratory distress.      Breath sounds: No wheezing.      Comments: Decreased BS   Abdominal:      General: Bowel sounds are normal. There is no distension.      Palpations: Abdomen is soft.      Tenderness: There is no abdominal tenderness.   Musculoskeletal:      Left lower leg: No edema.      Comments: Right BKA in stump protector   Skin:     General: Skin is warm and dry.   Neurological:      Mental  Status: He is alert and oriented to person, place, and time.         Fluids    Intake/Output Summary (Last 24 hours) at 1/24/2021 1530  Last data filed at 1/24/2021 1230  Gross per 24 hour   Intake 480 ml   Output 550 ml   Net -70 ml       Laboratory  Recent Labs     01/23/21  0454   WBC 8.7   RBC 2.46*   HEMOGLOBIN 7.8*   HEMATOCRIT 24.7*   .4*   MCH 31.7   MCHC 31.6*   RDW 58.5*   PLATELETCT 323   MPV 9.2     Recent Labs     01/23/21  0454   SODIUM 133*   POTASSIUM 4.0   CHLORIDE 94*   CO2 25   GLUCOSE 94   BUN 35*   CREATININE 5.57*   CALCIUM 8.8                 Assessment/Plan  * 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)- (present on admission)  Assessment & Plan  2/2 PVD and DM with non-healing wound and gangrene  S/P right BKA on 12/31/20 by Dr. Lynn  Wound care and pain control    Abrasion of penis with infection- (present on admission)  Assessment & Plan  Thought to be 2/2 Tomas trauma  Wound Cx +yeast  Completed Fluconazole  F/U UA 10-20 WBC w/ negative bacteria  Now clinically appears to have balanitis  Complete another course of Fluconazole    Paroxysmal A-fib ([facility])- (present on admission)  Assessment & Plan  Metoprolol discontinued for hypotension  Monitor for tachydysrhythmias  Anticoagulated on Eliquis    End stage renal disease on dialysis ([facility])- (present on admission)  Assessment & Plan  On maintenance HD TTS per [facility]    Type 2 diabetes mellitus with kidney complication, with long-term current use of insulin ([facility])- (present on admission)  Assessment & Plan  HbA1c 5.2  Serum glucose controlled on Lantus and SSI  Outpt meds include Lantus 5 units qhs    Anemia- (present on admission)  Assessment & Plan  Has macrocytic indices  Fe, Vit B12, Folate, and TSH levels all normal  On Epoetin per Nephrology  FOB+, increased PPI to bid  Suggest GI F/U  Follow H/H    Essential hypertension- (present on admission)  Assessment & Plan  Discontinued Metoprolol for  refractory hypotension  Monitor for orthostatic hypotension on Flomax, consider decreasing dose    Hyperthyroidism- (present on admission)  Assessment & Plan  Euthyroid on Methimazole    Dyslipidemia- (present on admission)  Assessment & Plan  On Lipitor    DNR

## 2021-01-25 NOTE — CARE PLAN
Problem: Recreation Therapy  Goal: LTG-By discharge, patient will demonstrate a standing tolerance  Description: By standing and dual tasking with a cognitive leisure activity for 5 minutes X3 CGA without LOB.   Outcome: NOT MET     Problem: Recreation Therapy  Goal: STG-Within one week, patient will demonstrate a standing tolerance  Description: By standing and dual tasking with a cognitive leisure activity for 3 minutes X3 Min A without LOB.   Outcome: PROGRESSING AS EXPECTED     Problem: Recreation Therapy  Goal: LTG-By discharge, patient will demonstrate leisure problem solving  Description: by sharing on two positive lesiure activities they would like to participate in following discharge and any perceived barriers to their participation.      Outcome: MET

## 2021-01-25 NOTE — THERAPY
Occupational Therapy  Daily Treatment     Patient Name: Luis Sepulveda  Age:  75 y.o., Sex:  male  Medical Record #: [id number]  Today's Date: 1/25/2021     Precautions  Precautions: (P) Fall Risk, Non Weight Bearing Right Lower Extremity  Comments: (P) R BKA, R IPOP, dialysis Tues, Thurs, Sat, L arm fistula, skin breakdown L heel    Safety   ADL Safety : (P) Modified Independent, Requires Supervision for Safety  Bathroom Safety: (P) Requires Supervision for Safety  Comments: (P) See below notes for ADL performance details.    Subjective    Pt agreeable to OT session.      Objective       01/25/21 0701   Precautions   Precautions Fall Risk;Non Weight Bearing Right Lower Extremity   Comments R BKA, R IPOP, dialysis Tues, Thurs, Sat, L arm fistula, skin breakdown L heel   Safety    ADL Safety  Modified Independent;Requires Supervision for Safety   Bathroom Safety Requires Supervision for Safety   Comments See below notes for ADL performance details.   Cognition    Level of Consciousness Alert   Functional Level of Assist   Eating Independent   Grooming Independent;Seated   Grooming Description Seated in wheelchair at sink  (shaving, oral care, combing hair)   Bathing Modified Independent   Bathing Description Grab bar;Hand held shower;Long handled bath tool;Tub bench;Increased time   Upper Body Dressing Independent   Lower Body Dressing Supervision   Lower Body Dressing Description Grab bar;Increased time;Reacher  (SBA to don/doff brief, pants and L sock)   Toileting Supervision   Toileting Description Grab bar;Increased time;Supervision for safety  (distant supv)   Toilet Transfers Supervised   Toilet Transfer Description Grab bar;Increased time;Set-up of equipment;Supervision for safety  (w/c<>toilet SPT with GB and supv)   Tub / Shower Transfers Supervised   Tub Shower Transfer Description Grab bar;Shower bench;Increased time;Set-up of equipment;Supervision for safety  (w/c<>shower bench SPT with GB and supv)    Bed Mobility    Supine to Sit Supervised   Scooting Supervised   Rolling Supervised   Interdisciplinary Plan of Care Collaboration   IDT Collaboration with  Nursing   Patient Position at End of Therapy Seated;Chair Alarm On;Self Releasing Lap Belt Applied;Call Light within Reach;Tray Table within Reach;Phone within Reach   Collaboration Comments CLOF, POC   OT Total Time Spent   OT Individual Total Time Spent (Mins) 60   OT Charge Group   OT Self Care / ADL 4       Assessment    Pt tolerated OT session well with focus on progression with ADLs and bathroom transfers. Pt demonstrating improved safety with toilet and shower transfers with use of GB and supv. He was able to complete LB dressing at supv level with AE. Pt with c/o L heel pain, but did not limit his participation in therapy session.     Strengths: Alert and oriented, Effective communication skills, Independent prior level of function, Making steady progress towards goals, Pleasant and cooperative, Supportive family, Willingly participates in therapeutic activities  Barriers: Decreased endurance, Fatigue, Impaired balance, pain    Plan    ADLs, IADLs and related functional mobility, Threshold shower transfers, BUE/Core strength, standing mike/balance.     Occupational Therapy Goals     Problem: Dressing     Dates: Start: 01/14/21       Goal: STG-Within one week, patient will dress LB     Dates: Start: 01/14/21       Description: 1) Individualized Goal:  with Min A and AE as needed  2) Interventions:  OT Group Therapy, OT Self Care/ADL, OT Cognitive Skill Dev, OT Community Reintegration, OT Manual Ther Technique, OT Neuro Re-Ed/Balance, OT Therapeutic Activity, OT Evaluation, and OT Therapeutic Exercise          Note:     Goal Note filed on 01/18/21 1149 by Romy Collins, OT    Requires max A.                         Problem: Functional Transfers     Dates: Start: 01/14/21       Goal: STG-Within one week, patient will transfer to toilet     Dates: Start:  01/14/21       Description: 1) Individualized Goal:  with CGA and AE as needed  2) Interventions:  OT Group Therapy, OT Self Care/ADL, OT Cognitive Skill Dev, OT Community Reintegration, OT Manual Ther Technique, OT Neuro Re-Ed/Balance, OT Therapeutic Activity, OT Evaluation, and OT Therapeutic Exercise       Note:     Goal Note filed on 01/18/21 1149 by Romy Collins, OT    Min A to sba.                   Goal: STG-Within one week, patient will transfer to tub/shower     Dates: Start: 01/14/21       Description: 1) Individualized Goal:  with CGA and AE as needed  2) Interventions:  OT Group Therapy, OT Self Care/ADL, OT Cognitive Skill Dev, OT Community Reintegration, OT Manual Ther Technique, OT Neuro Re-Ed/Balance, OT Therapeutic Activity, OT Evaluation, and OT Therapeutic Exercise    Note:     Goal Note filed on 01/18/21 1149 by Romy Collins, OT    To be addressed.                         Problem: OT Long Term Goals     Dates: Start: 01/14/21       Goal: LTG-By discharge, patient will complete basic self care tasks     Dates: Start: 01/14/21       Description: 1) Individualized Goal:  with mod I and AE as needed  2) Interventions:  OT Group Therapy, OT Self Care/ADL, OT Cognitive Skill Dev, OT Community Reintegration, OT Manual Ther Technique, OT Neuro Re-Ed/Balance, OT Therapeutic Activity, OT Evaluation, and OT Therapeutic Exercise      Note:     Goal Note filed on 01/14/21 1519 by Zeina Taylor MS,OTR/L    1) Individualized Goal:  with mod I and AE as needed  2) Interventions:  OT Group Therapy, OT Self Care/ADL, OT Cognitive Skill Dev, OT Community Reintegration, OT Manual Ther Technique, OT Neuro Re-Ed/Balance, OT Therapeutic Activity, OT Evaluation, and OT Therapeutic Exercise                   Goal: LTG-By discharge, patient will perform bathroom transfers     Dates: Start: 01/14/21       Description: 1) Individualized Goal:  with mod I and AE as needed  2) Interventions:  OT Group Therapy, OT  Self Care/ADL, OT Cognitive Skill Dev, OT Community Reintegration, OT Manual Ther Technique, OT Neuro Re-Ed/Balance, OT Therapeutic Activity, OT Evaluation, and OT Therapeutic Exercise       Note:     Goal Note filed on 01/14/21 1519 by Zeina Tyalor MS,OTR/L    1) Individualized Goal:  with mod I and AE as needed  2) Interventions:  OT Group Therapy, OT Self Care/ADL, OT Cognitive Skill Dev, OT Community Reintegration, OT Manual Ther Technique, OT Neuro Re-Ed/Balance, OT Therapeutic Activity, OT Evaluation, and OT Therapeutic Exercise

## 2021-01-25 NOTE — THERAPY
Physical Therapy   Daily Treatment     Patient Name: Luis Sepulveda  Age:  75 y.o., Sex:  male  Medical Record #: [id number]  Today's Date: 1/25/2021     Precautions  Precautions: Fall Risk, Non Weight Bearing Right Lower Extremity  Comments: R BKA, R IPOP, dialysis Tues, Thurs, Sat, L arm fistula, skin breakdown L heel    Subjective    Pt reports he thinks he can get IPOP on by himself     Objective       01/25/21 0931   Gait Functional Level of Assist    Gait Level Of Assist Minimal Assist   Assistive Device Parallel Bars   Distance (Feet) 10   # of Times Distance was Traveled 4   Deviation Step To  (ipop)   Wheelchair Functional Level of Assist   Wheelchair Assist Supervised   Distance Wheelchair (Feet or Distance) 150   Wheelchair Description Adaptive equipment;Extra time;Supervision for safety;Verbal cueing   Stairs Functional Level of Assist   Level of Assist with Stairs Unable to Participate   Transfer Functional Level of Assist   Bed, Chair, Wheelchair Transfer Contact Guard Assist  (Practice with transfers using bed rail and FWW)   Bed Chair Wheelchair Transfer Description Adaptive equipment;Increased time;Verbal cueing  (pt edu on getting bed rail for home for safety with bed tx)   Bed Mobility    Sit to Stand Contact Guard Assist  (repeated practice, cues for safety and sequencing)   Strengths & Barriers   Strengths Able to follow instructions;Pleasant and cooperative;Motivated for self care and independence;Independent prior level of function;Willingly participates in therapeutic activities   Barriers Generalized weakness;Impaired balance;Limited mobility;Impaired insight/denial of deficits;Poor family support   PT Total Time Spent   PT Individual Total Time Spent (Mins) 60   PT Charge Group   PT Therapeutic Activities 4     Pt able to put IPOP on independently without need for assist form therapist.     Assessment    Pt cont to demo much improved SPT safety and ability with decreased level of  assist - agreeable to order bed rail for safety with transfers into higher bed at home. Pt also cont to demo improved wc mob around facility over even surfaces like home.     Strengths: Able to follow instructions, Pleasant and cooperative, Motivated for self care and independence, Independent prior level of function, Willingly participates in therapeutic activities  Barriers: Generalized weakness, Impaired balance, Limited mobility, Impaired insight/denial of deficits, Poor family support    Plan    Exercise BLE - need to build safer strength on LLE, progress BKA edu, STS FWW, transfer safety, ambulation with close wc follow, SPT training with FWW, trial car transfer, family training, wc mob tight spaces          Physical Therapy Problems     Problem: Mobility     Dates: Start: 01/14/21       Goal: STG-Within one week, patient will propel wheelchair community     Dates: Start: 01/14/21       Description: 1) Individualized goal:  150 ft At mod I in order to improve activity tolerance propelling himself around facility  2) Interventions:  PT Gait Training, PT Therapeutic Exercises, PT Neuro Re-Ed/Balance, PT Therapeutic Activity, PT Manual Therapy, and PT Evaluation      Note:     Goal Note filed on 01/25/21 0940 by Christian Mcadams, PT    SP                        Problem: Mobility Transfers     Dates: Start: 01/25/21       Goal: STG-Within one week, patient will transfer bed to chair     Dates: Start: 01/25/21       Description: 1) Individualized goal:  At SPV with LRD In order to maximize self mobility  2) Interventions:  PT Gait Training, PT Therapeutic Exercises, PT Neuro Re-Ed/Balance, PT Therapeutic Activity, PT Manual Therapy, and PT Evaluation                Problem: PT-Long Term Goals     Dates: Start: 01/14/21       Goal: LTG-By discharge, patient will propel wheelchair     Dates: Start: 01/14/21       Description: 1) Individualized goal:  150 ft At mod I indoor and outdoor in order to improve activity  tolerance propelling himself around facility  2) Interventions:  PT Gait Training, PT Therapeutic Exercises, PT Neuro Re-Ed/Balance, PT Therapeutic Activity, PT Manual Therapy, and PT Evaluation          Goal: LTG-By discharge, patient will transfer one surface to another     Dates: Start: 01/14/21       Description: 1) Individualized goal:  At mod I with LRD In order to maximize self mobility  2) Interventions:  PT Gait Training, PT Therapeutic Exercises, PT Neuro Re-Ed/Balance, PT Therapeutic Activity, PT Manual Therapy, and PT Evaluation          Goal: LTG-By discharge, patient will transfer in/out of a car     Dates: Start: 01/14/21       Description: 1) Individualized goal:  At SPV with LRD In order to maximize self mobility  2) Interventions:  PT Gait Training, PT Therapeutic Exercises, PT Neuro Re-Ed/Balance, PT Therapeutic Activity, PT Manual Therapy, and PT Evaluation

## 2021-01-25 NOTE — CARE PLAN
Problem: Mobility  Goal: STG-Within one week, patient will propel wheelchair community  Description: 1) Individualized goal:  150 ft At mod I in order to improve activity tolerance propelling himself around facility  2) Interventions:  PT Gait Training, PT Therapeutic Exercises, PT Neuro Re-Ed/Balance, PT Therapeutic Activity, PT Manual Therapy, and PT Evaluation    Outcome: PROGRESSING AS EXPECTED  Note: SPV

## 2021-01-25 NOTE — THERAPY
Speech Language Pathology  Daily Treatment     Patient Name: Luis Sepulveda  Age:  75 y.o., Sex:  male  Medical Record #: [id number]  Today's Date: 1/25/2021     Precautions  Precautions: Fall Risk, Non Weight Bearing Right Lower Extremity  Comments: R BKA, R IPOP, dialysis Tues, Thurs, Sat, L arm fistula, skin breakdown L heel    Subjective    Pt pleasant and cooperative.      Objective       01/25/21 1033   SLP Total Time Spent   SLP Individual Total Time Spent (Mins) 60   Treatment Charges   SLP Cognitive Skill Development First 15 Minutes 1   SLP Cognitive Skill Development Additional 15 Minutes 3       Assessment    Medication sort completed at this time, pt completed with 100% accuracy indep. Pt also noted to demonstrate increased processing speed with ease of completion. Functional problem solving situations presented to pt related to medications and pt answered with appropriate solution with 100% accuracy indep.     Strengths: Able to follow instructions, Independent prior level of function  Barriers: Impulsive, Impaired functional cognition, Impaired insight/denial of deficits    Plan    Decrease ST to 30 min, further assess finances and reinforce use of memory log     Speech Therapy Problems     Problem: Memory STGs     Dates: Start: 01/14/21       Goal: STG-Within one week, patient will     Dates: Start: 01/14/21       Description: 1) Individualized goal:  complete verbal recall tasks with brief (5-10 minute) delay with 70% accuracy provided MIN cues.   2) Interventions:  SLP Self Care / ADL Training , SLP Cognitive Skill Development, and SLP Group Treatment                Problem: Problem Solving STGs     Dates: Start: 01/14/21       Goal: STG-Within one week, patient will     Dates: Start: 01/14/21       Description: 1) Individualized goal:  complete complex attention tasks involving medication management with 80% accuracy provided SPV.   2) Interventions:  SLP Self Care / ADL Training , SLP  Cognitive Skill Development, and SLP Group Treatment                Problem: Speech/Swallowing LTGs     Dates: Start: 01/14/21       Goal: LTG-By discharge, patient will     Dates: Start: 01/14/21       Description: 1) Individualized goal:  complete functional problem solving and recall information with 80% accuracy and MOD I.   2) Interventions:  SLP Self Care / ADL Training , SLP Cognitive Skill Development, and SLP Group Treatment

## 2021-01-25 NOTE — CARE PLAN
Problem: Communication  Goal: The ability to communicate needs accurately and effectively will improve  Outcome: PROGRESSING AS EXPECTED     Problem: Safety  Goal: Will remain free from falls  Outcome: PROGRESSING AS EXPECTED     Problem: Urinary Elimination:  Goal: Ability to reestablish a normal urinary elimination pattern will improve  Outcome: PROGRESSING SLOWER THAN EXPECTED

## 2021-01-25 NOTE — CARE PLAN
Problem: Memory STGs  Goal: STG-Within one week, patient will  Description: 1) Individualized goal:  complete verbal recall tasks with brief (5-10 minute) delay with 70% accuracy provided MIN cues.   2) Interventions:  SLP Self Care / ADL Training , SLP Cognitive Skill Development, and SLP Group Treatment  Outcome: NOT MET     Problem: Problem Solving STGs  Goal: STG-Within one week, patient will  Description: 1) Individualized goal:  complete complex attention tasks involving medication management with 80% accuracy provided SPV.   2) Interventions:  SLP Self Care / ADL Training , SLP Cognitive Skill Development, and SLP Group Treatment  Outcome: MET

## 2021-01-25 NOTE — THERAPY
"Recreational Therapy  Daily Treatment     Patient Name: Luis Sepulveda  AGE:  75 y.o., SEX:  male  Medical Record #: [id number]  Today's Date: 1/25/2021       Subjective    \"What pain?\" Pt reporting following being asked about pain level. Pt sharing how he was pleased to have the palacios catheter out days prior. Pt speaking on how he understands the reasoning behind his discharge to SNF and how it is \"best for him.\"  Pt reporting his brain at times will be \"foggy\" following his heart attack.      Objective       01/25/21 1501   Functional Ability Status - Cognitive   Attention Span Remains on Task   Comprehension Follows Three Step Commands   Judgment Able to Make Independent Decisions   Functional Ability Status - Emotional    Affect Appropriate   Mood Appropriate   Behavior Appropriate   Skilled Intervention    Skilled Intervention Gross Motor Leisure;Relaxation / Coping Skills;Cognitive Leisure   Skilled Intervention Comments Cognitive leisure activity while standing.    Interdisciplinary Plan of Care Collaboration   IDT Collaboration with  Other (See Comments);Physician  (recreation therapy tech)   Patient Position at End of Therapy Seated;Phone within Reach;Tray Table within Reach;Call Light within Reach   Collaboration Comments Physican speaking with Pt at start of session   Strengths & Barriers   Strengths Able to follow instructions;Alert and oriented;Pleasant and cooperative;Supportive family;Willingly participates in therapeutic activities   Barriers Impaired balance;Impaired functional cognition;Decreased endurance  (R BKA)   Treatment Time   Total Time Spent (mins) 30   Procedural Tracking   Procedural Tracking Gross Motor Functional Leisure Skills     Pt stood CGA for 5 minutes with Min A needed for sit to stand.     Assessment    No LOB however reporting that he felt weakness in his left knee and how he needs to \"build strength.\" Pt shared a goal to stand for two minutes however stood for 5 minutes. " Pt shared he enjoyed playing the cognitive leisure activity.       Strengths: (P) Able to follow instructions, Alert and oriented, Pleasant and cooperative, Supportive family, Willingly participates in therapeutic activities  Barriers: (P) Impaired balance, Impaired functional cognition, Decreased endurance(R BKA)    Plan    Left Pt with the cognitive leisure activity he was performing in the session so that he may continue during his free time.

## 2021-01-25 NOTE — PROGRESS NOTES
"[facility] Nephrology Progress Note    Author: Kamryn ECKERT  Collaborating Physician: Kulwant Hodges MD    Date of Service  1/25/2021    Chief Complaint  A 75-year-old male with end-stage renal disease   on chronic hemodialysis Tuesdays, Thursdays and Saturdays.  The patient was   admitted on 12/31 for right below-the-knee amputation.  He had wound and   gangrene of the right foot.  He has had some peripheral vascular procedures   previously.  He did have a right ray amputation in 11/2020.  He has had a   nonhealing wound and he was admitted for right below-the-knee amputation that   was performed on 12/31.  We have been consulted to assist in his dialysis   needs.  He had dialysis on 12/31 prior to admission.  Today, he is feeling   dyspneic.  He has no cough or sputum production.    Daily Nephrology Summary   1/13- Transferred to Rehab  1/14 - Pt sitting up in W/C in room, denies any complaints/concerns, due for HD today, BP labile, K+ 3.1, per chart review-penile discharge cx sent  1/15 - Pt getting up to restroom to shower with assistance, HD yesterday with 2.1L UF, K+ corrected to 3.6, iron stores replete, no growth on penile wound cx, VSS on RA   1/16 - No overnight events, HD later today.  No labs for review.  Sitting up at bedside eating lunch.  No complaints.   1/18 - No new events. Pt with palacios. C/O GI issues, bowels are \"loose\". No new events.   1/19 - HD today. VSS. No new events.Pt seen during therapy. C/O about palacios catheter.  1/20 - HD yest, UF 1.7L. Occult blood stool +. CXR improved. Abd xray no obstruction. Abd pain improved after 2 large BMs. C/O palacios catheter.  1/21 - Voiding trial on Mon. Due for HD today. Sleepy today 2/2 sleeping pill last hs, slept very well.  1/21 - Pt no longer with abd pain. C/O \"sore bottom\" and palacios catheter discomfort.  1/22 - Due to HD this am. Pt feels pretty good. Palacios removed. Pt denies pain but more discomfort once he gets up moving around. No abd " discomfort this am.  01/25- Up in wheelchair, feeling well.  No pain, n/v, sob.  Reports he was able to urinate this am.  No overnight events.     Review of Systems  Review of Systems   Constitutional: Positive for malaise/fatigue. Negative for chills and fever.   HENT: Negative.    Eyes: Negative.    Respiratory: Negative.    Cardiovascular: Negative.    Gastrointestinal: Negative.  Negative for abdominal pain, nausea and vomiting.   Genitourinary:        Tomas catheter   Musculoskeletal: Positive for joint pain. Negative for back pain and myalgias.        C/O RLE phantom pain   Skin: Negative.    Neurological: Negative.    Endo/Heme/Allergies: Negative.    Psychiatric/Behavioral: Negative.       Physical Exam  Temp:  [36.4 °C (97.5 °F)-36.7 °C (98 °F)] 36.4 °C (97.5 °F)  Pulse:  [82-87] 82  Resp:  [16-18] 18  BP: (100-120)/(40-61) 120/61  SpO2:  [95 %-99 %] 97 %    Physical Exam  Constitutional:       Appearance: Normal appearance.   HENT:      Head: Normocephalic and atraumatic.      Nose: Nose normal.      Mouth/Throat:      Mouth: Mucous membranes are moist.      Pharynx: Oropharynx is clear.   Eyes:      Extraocular Movements: Extraocular movements intact.      Pupils: Pupils are equal, round, and reactive to light.   Neck:      Musculoskeletal: Normal range of motion and neck supple.   Cardiovascular:      Rate and Rhythm: Normal rate and regular rhythm.      Pulses: Normal pulses.      Comments: L AVF +T/B  Pulmonary:      Effort: Pulmonary effort is normal.      Breath sounds: Normal breath sounds.   Abdominal:      General: Bowel sounds are normal.      Palpations: Abdomen is soft.   Musculoskeletal:         General: No deformity.      Comments: R BKA    Skin:     General: Skin is warm and dry.   Neurological:      General: No focal deficit present.      Mental Status: He is alert and oriented to person, place, and time.   Psychiatric:         Mood and Affect: Mood normal.         Behavior: Behavior  normal.         Fluids    Intake/Output Summary (Last 24 hours) at 1/25/2021 0840  Last data filed at 1/25/2021 0524  Gross per 24 hour   Intake 600 ml   Output 900 ml   Net -300 ml       Laboratory  Recent Labs     01/23/21  0454   WBC 8.7   RBC 2.46*   HEMOGLOBIN 7.8*   HEMATOCRIT 24.7*   .4*   MCH 31.7   MCHC 31.6*   RDW 58.5*   PLATELETCT 323   MPV 9.2     Recent Labs     01/23/21  0454   SODIUM 133*   POTASSIUM 4.0   CHLORIDE 94*   CO2 25   GLUCOSE 94   BUN 35*   CREATININE 5.57*   CALCIUM 8.8         No results for input(s): NTPROBNP in the last 72 hours.        Imaging  Available labs, imaging and clinical documentation reviewed.     Assessment/Plan  #  End-stage renal disease, on chronic hemodialysis qTTS  - [facility] dial[facility]zes [facility] via L AVF   #  Status post right BKA on 12/31/2020 for diabetic ulcer.  #  Hypertension, controlled  - On metoprolol   #  Anemia: superimposed blood loss in addition to chronic kidney disease.  - Hgb below target   - On ARMANDO   - Iron replete, ferritin elevated   #  Diabetes mellitus.  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  - On calcitriol and calcium acetate   - Calcium acetate currently being held   - Vit D 48   - Phos 4.1  - iPTH 109   #  Peripheral vascular disease.   #  Paroxysmal atrial fibrillation, on Eliquis and metoprolol.   #  Coronary artery disease, status post stents.  Asymptomatic.  #  History of hyperthyroidism. On methimazole.   #  Dyslipidemia, on statin therapy.   #  Past history of heavy tobacco use.  #  Pneumonia: s/p IV Zosyn   #  Encephalopathy: resolving  #  Weakness/debility  #  Urinary retention requiring catheter placement  - Chronic oliguria  - Tomas cath removed   - Voiding trail in effect   #  Hypokalemia, corrected  #  Hyponatremia in ESRD, mild, improved  #  Penile meatus wound  - NGTD on cx      PLAN:  -No Hemodialysis today (Mod)   -Continue hemodialysis qTTS/PRN  -UF as tolerated  -ARMANDO TIW with HD, goal Hgb 10-11   -Transfuse  PRN Hgb <7  -Goal BP <140/90  -No dietary protein restrictions  -Dose meds for ESRD  -Limit narcotics/sedating meds  -PT/OT/Speech  -May need urology consult for urinary retention  -Voiding trial in effect     Thank you,

## 2021-01-25 NOTE — CARE PLAN
Problem: Dressing  Goal: STG-Within one week, patient will dress LB  Description: 1) Individualized Goal:  with Min A and AE as needed  2) Interventions:  OT Group Therapy, OT Self Care/ADL, OT Cognitive Skill Dev, OT Community Reintegration, OT Manual Ther Technique, OT Neuro Re-Ed/Balance, OT Therapeutic Activity, OT Evaluation, and OT Therapeutic Exercise        Outcome: MET     Problem: Functional Transfers  Goal: STG-Within one week, patient will transfer to toilet  Description: 1) Individualized Goal:  with CGA and AE as needed  2) Interventions:  OT Group Therapy, OT Self Care/ADL, OT Cognitive Skill Dev, OT Community Reintegration, OT Manual Ther Technique, OT Neuro Re-Ed/Balance, OT Therapeutic Activity, OT Evaluation, and OT Therapeutic Exercise     Outcome: MET  Goal: STG-Within one week, patient will transfer to tub/shower  Description: 1) Individualized Goal:  with CGA and AE as needed  2) Interventions:  OT Group Therapy, OT Self Care/ADL, OT Cognitive Skill Dev, OT Community Reintegration, OT Manual Ther Technique, OT Neuro Re-Ed/Balance, OT Therapeutic Activity, OT Evaluation, and OT Therapeutic Exercise  Outcome: MET

## 2021-01-25 NOTE — PROGRESS NOTES
Pt attempted to void and was unsuccessful. Bladder scan 161. Cath at 1900 for 700 ml. Pt will attempt to void again in AM.

## 2021-01-25 NOTE — CARE PLAN
Problem: Recreation Therapy  Goal: LTG-By discharge, patient will demonstrate a standing tolerance  Description: By standing and dual tasking with a cognitive leisure activity for 5 minutes X3 CGA without LOB.   Outcome: NOT MET

## 2021-01-25 NOTE — DISCHARGE PLANNING
CM spoke with patients son Miguel to update on IDT and team recommendation of short stay at SNF.  Miguel expressed understanding and support.  CM to speak with patient to get SNF choice.  CM will continue to monitor for DC needs.

## 2021-01-25 NOTE — PROGRESS NOTES
"Rehab Progress Note     Date of Service: 1/25/2021  Chief Complaint: Follow-up BKA    Interval Events (Subjective)    Patient seen and examined today in his room.  He is currently denying any pain.  He reports he is very happy that his catheter was removed over the weekend and he is urinating.  Therapy reports he is moving much better without the catheter.  He did however require to straight caths in the last 48 hours.  Psychology assess the patient and did not find any evidence of depression.  Discussed with the patient that he will need more rehab at a skilled nursing facility due to his continued fall works and balance issues and his family is not able to assist him at home during the day because they work.  Patient is agreeable to this transfer.    Objective:  VITAL SIGNS: /61   Pulse 82   Temp 36.4 °C (97.5 °F) (Oral)   Resp 18   Ht 1.651 m (5' 5\")   Wt 79.5 kg (175 lb 4.3 oz)   SpO2 97%   BMI 29.17 kg/m²   Gen: alert, no apparent distress  Msk: Right transtibial amputation    Recent Results (from the past 72 hour(s))   ACCU-CHEK GLUCOSE    Collection Time: 01/22/21  5:14 PM   Result Value Ref Range    Glucose - Accu-Ck 107 (H) 65 - 99 mg/dL   URINALYSIS    Collection Time: 01/22/21  5:30 PM   Result Value Ref Range    Color Yellow     Character Clear     Specific Gravity 1.009 <1.035    Ph 8.5 (A) 5.0 - 8.0    Glucose 100 (A) Negative mg/dL    Ketones Negative Negative mg/dL    Protein 100 (A) Negative mg/dL    Bilirubin Negative Negative    Urobilinogen, Urine 0.2 Negative    Nitrite Negative Negative    Leukocyte Esterase Trace (A) Negative    Occult Blood Small (A) Negative    Micro Urine Req Microscopic    URINE MICROSCOPIC (W/UA)    Collection Time: 01/22/21  5:30 PM   Result Value Ref Range    WBC 10-20 (A) /hpf    RBC 2-5 (A) /hpf    Bacteria Negative None /hpf    Epithelial Cells Few /hpf    Epithelial Cells Renal Moderate (A) /hpf    Hyaline Cast 0-2 /lpf   ACCU-CHEK GLUCOSE    Collection " Time: 01/22/21  8:16 PM   Result Value Ref Range    Glucose - Accu-Ck 151 (H) 65 - 99 mg/dL   CBC WITHOUT DIFFERENTIAL    Collection Time: 01/23/21  4:54 AM   Result Value Ref Range    WBC 8.7 4.8 - 10.8 K/uL    RBC 2.46 (L) 4.70 - 6.10 M/uL    Hemoglobin 7.8 (L) 14.0 - 18.0 g/dL    Hematocrit 24.7 (L) 42.0 - 52.0 %    .4 (H) 81.4 - 97.8 fL    MCH 31.7 27.0 - 33.0 pg    MCHC 31.6 (L) 33.7 - 35.3 g/dL    RDW 58.5 (H) 35.9 - 50.0 fL    Platelet Count 323 164 - 446 K/uL    MPV 9.2 9.0 - 12.9 fL   Basic Metabolic Panel    Collection Time: 01/23/21  4:54 AM   Result Value Ref Range    Sodium 133 (L) 135 - 145 mmol/L    Potassium 4.0 3.6 - 5.5 mmol/L    Chloride 94 (L) 96 - 112 mmol/L    Co2 25 20 - 33 mmol/L    Glucose 94 65 - 99 mg/dL    Bun 35 (H) 8 - 22 mg/dL    Creatinine 5.57 (HH) 0.50 - 1.40 mg/dL    Calcium 8.8 8.5 - 10.5 mg/dL    Anion Gap 14.0 7.0 - 16.0   ESTIMATED GFR    Collection Time: 01/23/21  4:54 AM   Result Value Ref Range    GFR If  12 (A) >60 mL/min/1.73 m 2    GFR If Non African American 10 (A) >60 mL/min/1.73 m 2   ACCU-CHEK GLUCOSE    Collection Time: 01/23/21  7:17 AM   Result Value Ref Range    Glucose - Accu-Ck 107 (H) 65 - 99 mg/dL   ACCU-CHEK GLUCOSE    Collection Time: 01/23/21 11:25 AM   Result Value Ref Range    Glucose - Accu-Ck 152 (H) 65 - 99 mg/dL   ACCU-CHEK GLUCOSE    Collection Time: 01/23/21  5:05 PM   Result Value Ref Range    Glucose - Accu-Ck 153 (H) 65 - 99 mg/dL   ACCU-CHEK GLUCOSE    Collection Time: 01/23/21  9:15 PM   Result Value Ref Range    Glucose - Accu-Ck 160 (H) 65 - 99 mg/dL   ACCU-CHEK GLUCOSE    Collection Time: 01/24/21  7:19 AM   Result Value Ref Range    Glucose - Accu-Ck 105 (H) 65 - 99 mg/dL   ACCU-CHEK GLUCOSE    Collection Time: 01/24/21 11:28 AM   Result Value Ref Range    Glucose - Accu-Ck 145 (H) 65 - 99 mg/dL   ACCU-CHEK GLUCOSE    Collection Time: 01/24/21  5:09 PM   Result Value Ref Range    Glucose - Accu-Ck 152 (H) 65 - 99  mg/dL   ACCU-CHEK GLUCOSE    Collection Time: 01/24/21  9:44 PM   Result Value Ref Range    Glucose - Accu-Ck 150 (H) 65 - 99 mg/dL   ACCU-CHEK GLUCOSE    Collection Time: 01/25/21  7:11 AM   Result Value Ref Range    Glucose - Accu-Ck 108 (H) 65 - 99 mg/dL   ACCU-CHEK GLUCOSE    Collection Time: 01/25/21 11:26 AM   Result Value Ref Range    Glucose - Accu-Ck 145 (H) 65 - 99 mg/dL       Current Facility-Administered Medications   Medication Frequency   • fluconazole (DIFLUCAN) tablet 100 mg DAILY   • senna-docusate (PERICOLACE or SENOKOT S) 8.6-50 MG per tablet 2 Tab BID PRN    And   • magnesium hydroxide (MILK OF MAGNESIA) suspension 30 mL QDAY PRN    And   • bisacodyl (DULCOLAX) suppository 10 mg QDAY PRN   • miconazole 2%-zinc oxide (Dominga) topical cream BID   • omeprazole (PRILOSEC) capsule 20 mg BID   • insulin regular (HumuLIN R,NovoLIN R) injection 4X/DAY ACHS    And   • dextrose 50% (D50W) injection 50 mL Q15 MIN PRN   • insulin glargine (Lantus) injection QAM INSULIN   • tamsulosin (FLOMAX) capsule 0.8 mg QHS   • gabapentin (NEURONTIN) capsule 300 mg DAILY   • heparin injection 1,800 Units DIALYSIS PRN   • hydrOXYzine HCl (ATARAX) tablet 50 mg Q6HRS PRN   • melatonin tablet 3 mg HS PRN   • Respiratory Therapy Consult Continuous RT   • Pharmacy Consult Request ...Pain Management Review 1 Each PHARMACY TO DOSE   • acetaminophen (Tylenol) tablet 650 mg Q4HRS PRN   • lactulose 20 GM/30ML solution 30 mL QDAY PRN   • docusate sodium (ENEMEEZ) enema 283 mg QDAY PRN   • fleet enema 133 mL QDAY PRN   • artificial tears ophthalmic solution 1 Drop PRN   • benzocaine-menthol (CEPACOL) lozenge 1 Lozenge Q2HRS PRN   • mag hydrox-al hydrox-simeth (MAALOX PLUS ES or MYLANTA DS) suspension 20 mL Q2HRS PRN   • ondansetron (ZOFRAN ODT) dispertab 4 mg 4X/DAY PRN    Or   • ondansetron (ZOFRAN) syringe/vial injection 4 mg 4X/DAY PRN   • traZODone (DESYREL) tablet 50 mg QHS PRN   • sodium chloride (OCEAN) 0.65 % nasal spray 2  Spray PRN   • traMADol (ULTRAM) 50 MG tablet 50 mg Q4HRS PRN   • atorvastatin (LIPITOR) tablet 40 mg Q EVENING   • calcitRIOL (ROCALTROL) capsule 0.25 mcg DAILY   • epoetin (Retacrit) injection (Dialysis use only) 10,000 Units TUE+THU+SAT   • methimazole (TAPAZOLE) tablet 5 mg Q EVENING   • methimazole (TAPAZOLE) tablet 7.5 mg QAM   • oxyCODONE immediate-release (ROXICODONE) tablet 5 mg Q4HRS PRN   • apixaban (ELIQUIS) tablet 2.5 mg BID       Orders Placed This Encounter   Procedures   • Diet Order Diet: Renal; Second Modifier: (optional): Consistent CHO (Diabetic)     Standing Status:   Standing     Number of Occurrences:   1     Order Specific Question:   Diet:     Answer:   Renal [8]     Order Specific Question:   Second Modifier: (optional)     Answer:   Consistent CHO (Diabetic) [4]       Assessment:  Active Hospital Problems    Diagnosis   • *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)   • Leukocytosis   • Urinary retention   • Paroxysmal A-fib (HCC)   • End stage renal disease on dialysis (HCC)   • Type 2 diabetes mellitus with kidney complication, with long-term current use of insulin (HCC)   • Essential hypertension   • Anemia   • Hyperthyroidism   • Dyslipidemia   • Hypokalemia   • Impaired mobility and ADLs     This patient is a 75 y.o. male admitted for acute inpatient rehabilitation with 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).    I led and attended the weekly conference, and agree with the IDT conference documentation and plan of care as noted below.    Date of conference: 1/25/2021    Goals and barriers: See IDT note.    Biggest barriers: doesn't have 24/7 at home as family works    Goals in next week: discharge to skilled, right BKA, left knee ana, poor safety awareness    CM/social support: ex-wife and son work during the day    [facility]    Follow up: PCP, surgery, Dr. Elliott, urology, nephrology    Medical Decision Making and  Plan:    Right BKA  12/31 Dr. Lynn  Continue full rehab program  PT/OT/SLP, 1 hr each discipline, 5 days per week     Outpatient follow-up for suture removal after discharge with surgery    Outpatient follow-up with Dr. Elliott, referral made    Phantom pain, resolved  Continue gabapentin 300 mg  Outpatient follow-up with Dr. Elliott, referral made     Encephalopathy, resolved  Speech therapy for cognitive assessment  Currently only with mild memory deficits    ?Depression, not found  Psychology consult, Dr Izaguirre, appreciate assistance     Urinary retention, improved  Had very high residuals  Continue intermittent caths for over 400 cc - last required 1/24  Started Flomax 0.4 mg 1/14 --> 0.8 mg 1/16  PRN lidocaine jelly for caths  Placed indwelling catheter for bladder rest 1/17, removed 1/23, voiding improved  Referral to urology made    Bowel program  History of diabetic gastroparesis  Continue bowel medications  Scheduled Sennakot  PRN Miralax, MOM, bisacodyl suppository  Last BM 1/24    DVT prophylaxis  Eliquis     Appreciate the assistance of the hospitalist with his medical comorbidities:     End-stage renal disease on hemodialysis  Consulted nephrology  Continue calcitriol 0.25 mcg daily  Continue Retacrit with dialysis  Continue K-Phos 250 mg 2 times daily     Diabetes with hyperglycemia  Sliding scale insulin  Lantus 5 units daily --> 8 units 1/19     Peripheral artery disease  Continue Eliquis, renal dosing  Continue statin     Coronary artery disease  With history of stents  Continue statin     Paroxysmal atrial fibrillation  Continue Eliquis, renal dosing  Discontinued metoprolol      Hypertension  Hypotension  Discontinue metoprolol     Hyperlipidemia  Continue statin     Hyperthyroidism  Continue methimazole 7.5 mg in the morning, 5 mg in the evening     GERD  Continue omeprazole 20 mg     Anemia  Likely of chronic renal disease  Continue Retacrit     Leukocytosis, resolved  Recently treated with IV  antibiotics for pneumonia  Negative urinalysis and chest x-ray  Negative blood cultures    Elevated procalcitonin, improved  Recently treated with IV antibiotics for pneumonia    Penile discharge  Culture with yeast, status post Diflucan    COVID negative 1/11, re-screen 1/17 negative    Total time:  40 minutes.  I spent greater than 50% of the time for patient care, counseling, and coordination on this date, including patient face-to face time, unit/floor time with review of records/pertinent lab data and studies, as well as discussing diagnostic evaluation/work up, planned therapeutic interventions, and future disposition of care, as per the interval events/subjective and the assessment and plan as noted above.      Lata Goodman M.D.   Physical Medicine and Rehabilitation

## 2021-01-25 NOTE — PROGRESS NOTES
Hospital Medicine Daily Progress Note      Chief Complaint:  Hypertension  Afib  Diabetes  Leukocytosis  Penile wound    Interval History:  No new issues.    Review of Systems  Review of Systems   Constitutional: Negative for chills and fever.   HENT: Negative.    Eyes: Negative.    Respiratory: Negative for cough and shortness of breath.    Cardiovascular: Negative for chest pain and palpitations.   Gastrointestinal: Negative for abdominal pain, nausea and vomiting.   Musculoskeletal:        Wound pain    Skin: Negative for itching and rash.   Endo/Heme/Allergies: Negative for polydipsia. Does not bruise/bleed easily.        Physical Exam  Temp:  [36.4 °C (97.5 °F)-36.6 °C (97.8 °F)] 36.6 °C (97.8 °F)  Pulse:  [81-87] 81  Resp:  [16-18] 18  BP: (100-149)/(53-65) 149/65  SpO2:  [95 %-100 %] 100 %    Physical Exam  Vitals signs reviewed.   Constitutional:       General: He is not in acute distress.     Appearance: Normal appearance. He is not ill-appearing.   HENT:      Head: Normocephalic and atraumatic.      Right Ear: External ear normal.      Left Ear: External ear normal.      Nose: Nose normal.      Mouth/Throat:      Pharynx: Oropharynx is clear.   Eyes:      General:         Right eye: No discharge.         Left eye: No discharge.      Extraocular Movements: Extraocular movements intact.      Conjunctiva/sclera: Conjunctivae normal.   Neck:      Musculoskeletal: Normal range of motion and neck supple.   Cardiovascular:      Rate and Rhythm: Normal rate and regular rhythm.   Pulmonary:      Effort: No respiratory distress.      Breath sounds: No wheezing.      Comments: Decreased BS   Abdominal:      General: Bowel sounds are normal. There is no distension.      Palpations: Abdomen is soft.      Tenderness: There is no abdominal tenderness.   Musculoskeletal:      Left lower leg: No edema.      Comments: Right BKA   Skin:     General: Skin is warm and dry.   Neurological:      Mental Status: He is alert and  oriented to person, place, and time.         Fluids    Intake/Output Summary (Last 24 hours) at 1/25/2021 1525  Last data filed at 1/25/2021 1400  Gross per 24 hour   Intake 960 ml   Output 900 ml   Net 60 ml       Laboratory  Recent Labs     01/23/21  0454   WBC 8.7   RBC 2.46*   HEMOGLOBIN 7.8*   HEMATOCRIT 24.7*   .4*   MCH 31.7   MCHC 31.6*   RDW 58.5*   PLATELETCT 323   MPV 9.2     Recent Labs     01/23/21  0454   SODIUM 133*   POTASSIUM 4.0   CHLORIDE 94*   CO2 25   GLUCOSE 94   BUN 35*   CREATININE 5.57*   CALCIUM 8.8                 Assessment/Plan  * 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)- (present on admission)  Assessment & Plan  2/2 PVD and DM with non-healing wound and gangrene  S/P right BKA on 12/31/20 by Dr. Lynn  Wound care and pain control    Abrasion of penis with infection- (present on admission)  Assessment & Plan  Thought to be 2/2 Tomas trauma  Wound Cx +yeast  Completed Fluconazole  F/U UA 10-20 WBC w/ negative bacteria  Now clinically appears to have balanitis  Complete another course of Fluconazole    Paroxysmal A-fib ([facility])- (present on admission)  Assessment & Plan  Metoprolol discontinued for hypotension  Monitor for tachydysrhythmias  Anticoagulated on Eliquis    End stage renal disease on dialysis ([facility])- (present on admission)  Assessment & Plan  On maintenance HD TTS per [facility] Nephrology    Type 2 diabetes mellitus with kidney complication, with long-term current use of insulin ([facility])- (present on admission)  Assessment & Plan  HbA1c 5.2  Serum glucose controlled on Lantus and SSI  Outpt meds include Lantus 5 units qhs    Anemia- (present on admission)  Assessment & Plan  Has macrocytic indices  Fe, Vit B12, Folate, and TSH levels all normal  On Epoetin per Nephrology  FOB+, increased PPI to bid  Suggest GI F/U  Follow H/H    Essential hypertension- (present on admission)  Assessment & Plan  Discontinued Metoprolol for refractory  hypotension  Monitor for orthostatic hypotension on Flomax, consider decreasing dose    Hyperthyroidism- (present on admission)  Assessment & Plan  Euthyroid on Methimazole    Dyslipidemia- (present on admission)  Assessment & Plan  On Lipitor    DNR    Patient seen and examined.  Chart reviewed.  Assessment and Plan as above.  No changes today.

## 2021-01-26 NOTE — THERAPY
Physical Therapy   Daily Treatment     Patient Name: Luis Sepulveda  Age:  75 y.o., Sex:  male  Medical Record #: [id number]  Today's Date: 1/26/2021     Precautions  Precautions: Fall Risk, Non Weight Bearing Right Lower Extremity  Comments: R BKA, R IPOP, dialysis Tues, Thurs, Sat, L arm fistula, skin breakdown L heel    Subjective    Pt reports he is agreeable to transfer and gait training      Objective       01/26/21 1231   Gait Functional Level of Assist    Gait Level Of Assist Contact Guard Assist   Assistive Device Front Wheel Walker   Distance (Feet) 25   # of Times Distance was Traveled 1   Deviation Other (Comment)  (Hop to pattern, close wc follow for safety)   Wheelchair Functional Level of Assist   Wheelchair Assist Modified Independent   Distance Wheelchair (Feet or Distance) 150   Wheelchair Description Adaptive equipment;Extra time   Transfer Functional Level of Assist   Bed, Chair, Wheelchair Transfer Contact Guard Assist   Bed Chair Wheelchair Transfer Description Adaptive equipment;Increased time   Bed Mobility    Supine to Sit Independent   Sit to Supine Independent   Sit to Stand Contact Guard Assist   Scooting Independent   Rolling Independent   Interdisciplinary Plan of Care Collaboration   Patient Position at End of Therapy Seated;Call Light within Reach;Tray Table within Reach;Self Releasing Lap Belt Applied;Chair Alarm On   PT Total Time Spent   PT Individual Total Time Spent (Mins) 60   PT Charge Group   PT Gait Training 2   PT Therapeutic Activities 2     Car transfer - cues for safety and sequencing, pt performed at [facility]     Assessment    Pt cont to demo [facility] level transfers - unable to perform independently. Improved walking distance this session with decreased level of assist using FWW.    Strengths: Able to follow instructions, Pleasant and cooperative, Motivated for self care and independence, Independent prior level of function, Willingly participates in therapeutic  activities  Barriers: Generalized weakness, Impaired balance, Limited mobility, Impaired insight/denial of deficits, Poor family support    Plan    Exercise BLE - need to build safer strength on LLE, progress BKA edu, STS FWW, transfer safety, ambulation with close wc follow and FWW, SPT training with FWW, wc mob tight spaces    Physical Therapy Problems     Problem: Mobility     Dates: Start: 01/14/21       Goal: STG-Within one week, patient will propel wheelchair community     Dates: Start: 01/14/21       Description: 1) Individualized goal:  150 ft At mod I in order to improve activity tolerance propelling himself around facility  2) Interventions:  PT Gait Training, PT Therapeutic Exercises, PT Neuro Re-Ed/Balance, PT Therapeutic Activity, PT Manual Therapy, and PT Evaluation      Note:     Goal Note filed on 01/25/21 0950 by Christian Mcadams, PT    SPV                        Problem: Mobility Transfers     Dates: Start: 01/25/21       Goal: STG-Within one week, patient will transfer bed to chair     Dates: Start: 01/25/21       Description: 1) Individualized goal:  At V with LRD In order to maximize self mobility  2) Interventions:  PT Gait Training, PT Therapeutic Exercises, PT Neuro Re-Ed/Balance, PT Therapeutic Activity, PT Manual Therapy, and PT Evaluation                Problem: PT-Long Term Goals     Dates: Start: 01/14/21       Goal: LTG-By discharge, patient will propel wheelchair     Dates: Start: 01/14/21       Description: 1) Individualized goal:  150 ft At mod I indoor and outdoor in order to improve activity tolerance propelling himself around facility  2) Interventions:  PT Gait Training, PT Therapeutic Exercises, PT Neuro Re-Ed/Balance, PT Therapeutic Activity, PT Manual Therapy, and PT Evaluation          Goal: LTG-By discharge, patient will transfer one surface to another     Dates: Start: 01/14/21       Description: 1) Individualized goal:  At mod I with LRD In order to maximize self  mobility  2) Interventions:  PT Gait Training, PT Therapeutic Exercises, PT Neuro Re-Ed/Balance, PT Therapeutic Activity, PT Manual Therapy, and PT Evaluation          Goal: LTG-By discharge, patient will transfer in/out of a car     Dates: Start: 01/14/21       Description: 1) Individualized goal:  At SPV with LRD In order to maximize self mobility  2) Interventions:  PT Gait Training, PT Therapeutic Exercises, PT Neuro Re-Ed/Balance, PT Therapeutic Activity, PT Manual Therapy, and PT Evaluation

## 2021-01-26 NOTE — THERAPY
Occupational Therapy  Daily Treatment     Patient Name: Luis Sepulveda  Age:  75 y.o., Sex:  male  Medical Record #: [id number]  Today's Date: 1/26/2021     Precautions  Precautions: (P) Fall Risk, Non Weight Bearing Right Lower Extremity  Comments: (P) R BKA, R IPOP, dialysis Tues, Thurs, Sat, L arm fistula, skin breakdown L heel    Safety   ADL Safety : Modified Independent, Requires Supervision for Safety  Bathroom Safety: Requires Supervision for Safety  Comments: See below notes for ADL performance details.    Subjective    Pt agreeable to OT session.     Objective       01/26/21 1331   Precautions   Precautions Fall Risk;Non Weight Bearing Right Lower Extremity   Comments R BKA, R IPOP, dialysis Tues, Thurs, Sat, L arm fistula, skin breakdown L heel   Sitting Upper Body Exercises   Other Exercise UB/core strengthening exercises seated EOM with 6# medicine ball, 3x10 each: bicep curls, front arm raise, chest press,  OH press, abdominal twists, diagonal arm raise, and weighted ball pass.    Interdisciplinary Plan of Care Collaboration   IDT Collaboration with  Certified Nursing Assistant   Patient Position at End of Therapy Seated;Chair Alarm On;Self Releasing Lap Belt Applied;Call Light within Reach;Tray Table within Reach;Phone within Reach   Collaboration Comments CNA for vitals   OT Total Time Spent   OT Individual Total Time Spent (Mins) 30   OT Charge Group   OT Therapeutic Exercise  2     W/c<>EOM SPT with FWW and SBA.     Assessment    Pt tolerated OT session well with focus on UB/core strengthening. Pt completed exercises with no c/o pain, and intermittent rest breaks 2/2 fatigue. Pt con't to be highly motivated and participatory.       Strengths: Alert and oriented, Effective communication skills, Independent prior level of function, Making steady progress towards goals, Pleasant and cooperative, Supportive family, Willingly participates in therapeutic activities  Barriers: Decreased endurance,  Fatigue, Impaired balance(safety awareness)    Plan    ADLs, IADLs and related functional mobility, Threshold shower transfers, BUE/Core strength, standing mike/balance.    Occupational Therapy Goals     Problem: Functional Transfers     Dates: Start: 01/25/21       Goal: STG-Within one week, patient will transfer to step in shower     Dates: Start: 01/25/21       Description: 1) Individualized Goal: supervision with AE/DME as needed  2) Interventions:  OT Group Therapy, OT Self Care/ADL, OT Cognitive Skill Dev, OT Community Reintegration, OT Manual Ther Technique, OT Neuro Re-Ed/Balance, OT Therapeutic Activity, OT Evaluation, and OT Therapeutic Exercise                  Problem: IADL's     Dates: Start: 01/25/21       Goal: STG-Within one week, patient will access kitchen area     Dates: Start: 01/25/21       Description: 1) Individualized Goal:  supervision from w/c level.    2) Interventions:  OT Group Therapy, OT Self Care/ADL, OT Cognitive Skill Dev, OT Community Reintegration, OT Manual Ther Technique, OT Neuro Re-Ed/Balance, OT Therapeutic Activity, OT Evaluation, and OT Therapeutic Exercise                  Problem: OT Long Term Goals     Dates: Start: 01/14/21       Goal: LTG-By discharge, patient will complete basic self care tasks     Dates: Start: 01/14/21       Description: 1) Individualized Goal:  with mod I and AE as needed  2) Interventions:  OT Group Therapy, OT Self Care/ADL, OT Cognitive Skill Dev, OT Community Reintegration, OT Manual Ther Technique, OT Neuro Re-Ed/Balance, OT Therapeutic Activity, OT Evaluation, and OT Therapeutic Exercise      Note:     Goal Note filed on 01/14/21 9589 by Zeina Taylor MS,OTR/L    1) Individualized Goal:  with mod I and AE as needed  2) Interventions:  OT Group Therapy, OT Self Care/ADL, OT Cognitive Skill Dev, OT Community Reintegration, OT Manual Ther Technique, OT Neuro Re-Ed/Balance, OT Therapeutic Activity, OT Evaluation, and OT Therapeutic Exercise                    Goal: LTG-By discharge, patient will perform bathroom transfers     Dates: Start: 01/14/21       Description: 1) Individualized Goal:  with mod I and AE as needed  2) Interventions:  OT Group Therapy, OT Self Care/ADL, OT Cognitive Skill Dev, OT Community Reintegration, OT Manual Ther Technique, OT Neuro Re-Ed/Balance, OT Therapeutic Activity, OT Evaluation, and OT Therapeutic Exercise       Note:     Goal Note filed on 01/14/21 1439 by Zeina Taylor MS,OTR/L    1) Individualized Goal:  with mod I and AE as needed  2) Interventions:  OT Group Therapy, OT Self Care/ADL, OT Cognitive Skill Dev, OT Community Reintegration, OT Manual Ther Technique, OT Neuro Re-Ed/Balance, OT Therapeutic Activity, OT Evaluation, and OT Therapeutic Exercise

## 2021-01-26 NOTE — CARE PLAN
Problem: Communication  Goal: The ability to communicate needs accurately and effectively will improve  Outcome: PROGRESSING AS EXPECTED  Note: Patient remains alert and oriented x 4.      Problem: Safety  Goal: Will remain free from injury  Outcome: PROGRESSING AS EXPECTED  Note: Pt uses call light consistently and appropriately. Waits for assistance does not attempt self transfer this shift. Able to verbalize needs.

## 2021-01-26 NOTE — PROGRESS NOTES
"Rehab Progress Note     Date of Service: 1/26/2021  Chief Complaint: Follow-up BKA    Interval Events (Subjective)    Patient seen and examined today in his room. He is currently denying any pain. His urinary retention is improved and he has not needed straight catheterization in several days. He is going to have hemodialysis this afternoon. He has been accepted to a skilled nursing facility and will likely transfer tomorrow. He has no new complaints.    Objective:  VITAL SIGNS: /65   Pulse 88   Temp 36.7 °C (98.1 °F) (Oral)   Resp 18   Ht 1.651 m (5' 5\")   Wt 79.5 kg (175 lb 4.3 oz)   SpO2 99%   BMI 29.17 kg/m²   Gen: alert, no apparent distress  Msk: Right transtibial amputation with IPOP    Recent Results (from the past 72 hour(s))   ACCU-CHEK GLUCOSE    Collection Time: 01/23/21  5:05 PM   Result Value Ref Range    Glucose - Accu-Ck 153 (H) 65 - 99 mg/dL   ACCU-CHEK GLUCOSE    Collection Time: 01/23/21  9:15 PM   Result Value Ref Range    Glucose - Accu-Ck 160 (H) 65 - 99 mg/dL   ACCU-CHEK GLUCOSE    Collection Time: 01/24/21  7:19 AM   Result Value Ref Range    Glucose - Accu-Ck 105 (H) 65 - 99 mg/dL   ACCU-CHEK GLUCOSE    Collection Time: 01/24/21 11:28 AM   Result Value Ref Range    Glucose - Accu-Ck 145 (H) 65 - 99 mg/dL   ACCU-CHEK GLUCOSE    Collection Time: 01/24/21  5:09 PM   Result Value Ref Range    Glucose - Accu-Ck 152 (H) 65 - 99 mg/dL   ACCU-CHEK GLUCOSE    Collection Time: 01/24/21  9:44 PM   Result Value Ref Range    Glucose - Accu-Ck 150 (H) 65 - 99 mg/dL   ACCU-CHEK GLUCOSE    Collection Time: 01/25/21  7:11 AM   Result Value Ref Range    Glucose - Accu-Ck 108 (H) 65 - 99 mg/dL   ACCU-CHEK GLUCOSE    Collection Time: 01/25/21 11:26 AM   Result Value Ref Range    Glucose - Accu-Ck 145 (H) 65 - 99 mg/dL   ACCU-CHEK GLUCOSE    Collection Time: 01/25/21  5:05 PM   Result Value Ref Range    Glucose - Accu-Ck 160 (H) 65 - 99 mg/dL   ACCU-CHEK GLUCOSE    Collection Time: 01/25/21  9:50 PM "   Result Value Ref Range    Glucose - Accu-Ck 143 (H) 65 - 99 mg/dL   Comp Metabolic Panel    Collection Time: 01/26/21  5:28 AM   Result Value Ref Range    Sodium 132 (L) 135 - 145 mmol/L    Potassium 4.5 3.6 - 5.5 mmol/L    Chloride 95 (L) 96 - 112 mmol/L    Co2 24 20 - 33 mmol/L    Anion Gap 13.0 7.0 - 16.0    Glucose 136 (H) 65 - 99 mg/dL    Bun 44 (H) 8 - 22 mg/dL    Creatinine 6.59 (HH) 0.50 - 1.40 mg/dL    Calcium 9.1 8.5 - 10.5 mg/dL    AST(SGOT) 9 (L) 12 - 45 U/L    ALT(SGPT) 11 2 - 50 U/L    Alkaline Phosphatase 117 (H) 30 - 99 U/L    Total Bilirubin 0.3 0.1 - 1.5 mg/dL    Albumin 3.3 3.2 - 4.9 g/dL    Total Protein 6.2 6.0 - 8.2 g/dL    Globulin 2.9 1.9 - 3.5 g/dL    A-G Ratio 1.1 g/dL   CBC WITH DIFFERENTIAL    Collection Time: 01/26/21  5:28 AM   Result Value Ref Range    WBC 6.5 4.8 - 10.8 K/uL    RBC 2.78 (L) 4.70 - 6.10 M/uL    Hemoglobin 8.8 (L) 14.0 - 18.0 g/dL    Hematocrit 27.6 (L) 42.0 - 52.0 %    MCV 99.3 (H) 81.4 - 97.8 fL    MCH 31.7 27.0 - 33.0 pg    MCHC 31.9 (L) 33.7 - 35.3 g/dL    RDW 55.8 (H) 35.9 - 50.0 fL    Platelet Count 321 164 - 446 K/uL    MPV 9.1 9.0 - 12.9 fL    Neutrophils-Polys 74.70 (H) 44.00 - 72.00 %    Lymphocytes 14.70 (L) 22.00 - 41.00 %    Monocytes 8.90 0.00 - 13.40 %    Eosinophils 0.30 0.00 - 6.90 %    Basophils 0.50 0.00 - 1.80 %    Immature Granulocytes 0.90 0.00 - 0.90 %    Nucleated RBC 0.00 /100 WBC    Neutrophils (Absolute) 4.86 1.82 - 7.42 K/uL    Lymphs (Absolute) 0.96 (L) 1.00 - 4.80 K/uL    Monos (Absolute) 0.58 0.00 - 0.85 K/uL    Eos (Absolute) 0.02 0.00 - 0.51 K/uL    Baso (Absolute) 0.03 0.00 - 0.12 K/uL    Immature Granulocytes (abs) 0.06 0.00 - 0.11 K/uL    NRBC (Absolute) 0.00 K/uL   ESTIMATED GFR    Collection Time: 01/26/21  5:28 AM   Result Value Ref Range    GFR If African American 10 (A) >60 mL/min/1.73 m 2    GFR If Non  8 (A) >60 mL/min/1.73 m 2   ACCU-CHEK GLUCOSE    Collection Time: 01/26/21  7:55 AM   Result Value Ref Range     Glucose - Accu-Ck 128 (H) 65 - 99 mg/dL   ACCU-CHEK GLUCOSE    Collection Time: 01/26/21 11:26 AM   Result Value Ref Range    Glucose - Accu-Ck 128 (H) 65 - 99 mg/dL       Current Facility-Administered Medications   Medication Frequency   • fluconazole (DIFLUCAN) tablet 100 mg DAILY   • senna-docusate (PERICOLACE or SENOKOT S) 8.6-50 MG per tablet 2 Tab BID PRN    And   • magnesium hydroxide (MILK OF MAGNESIA) suspension 30 mL QDAY PRN    And   • bisacodyl (DULCOLAX) suppository 10 mg QDAY PRN   • miconazole 2%-zinc oxide (Dominga) topical cream BID   • omeprazole (PRILOSEC) capsule 20 mg BID   • insulin regular (HumuLIN R,NovoLIN R) injection 4X/DAY ACHS    And   • dextrose 50% (D50W) injection 50 mL Q15 MIN PRN   • insulin glargine (Lantus) injection QAM INSULIN   • tamsulosin (FLOMAX) capsule 0.8 mg QHS   • gabapentin (NEURONTIN) capsule 300 mg DAILY   • heparin injection 1,800 Units DIALYSIS PRN   • hydrOXYzine HCl (ATARAX) tablet 50 mg Q6HRS PRN   • melatonin tablet 3 mg HS PRN   • Respiratory Therapy Consult Continuous RT   • Pharmacy Consult Request ...Pain Management Review 1 Each PHARMACY TO DOSE   • acetaminophen (Tylenol) tablet 650 mg Q4HRS PRN   • lactulose 20 GM/30ML solution 30 mL QDAY PRN   • docusate sodium (ENEMEEZ) enema 283 mg QDAY PRN   • fleet enema 133 mL QDAY PRN   • artificial tears ophthalmic solution 1 Drop PRN   • benzocaine-menthol (CEPACOL) lozenge 1 Lozenge Q2HRS PRN   • mag hydrox-al hydrox-simeth (MAALOX PLUS ES or MYLANTA DS) suspension 20 mL Q2HRS PRN   • ondansetron (ZOFRAN ODT) dispertab 4 mg 4X/DAY PRN    Or   • ondansetron (ZOFRAN) syringe/vial injection 4 mg 4X/DAY PRN   • traZODone (DESYREL) tablet 50 mg QHS PRN   • sodium chloride (OCEAN) 0.65 % nasal spray 2 Spray PRN   • traMADol (ULTRAM) 50 MG tablet 50 mg Q4HRS PRN   • atorvastatin (LIPITOR) tablet 40 mg Q EVENING   • calcitRIOL (ROCALTROL) capsule 0.25 mcg DAILY   • epoetin (Retacrit) injection (Dialysis use only)  10,000 Units TUE+THU+SAT   • methimazole (TAPAZOLE) tablet 5 mg Q EVENING   • methimazole (TAPAZOLE) tablet 7.5 mg QAM   • oxyCODONE immediate-release (ROXICODONE) tablet 5 mg Q4HRS PRN   • apixaban (ELIQUIS) tablet 2.5 mg BID       Orders Placed This Encounter   Procedures   • Diet Order Diet: Renal; Second Modifier: (optional): Consistent CHO (Diabetic)     Standing Status:   Standing     Number of Occurrences:   1     Order Specific Question:   Diet:     Answer:   Renal [8]     Order Specific Question:   Second Modifier: (optional)     Answer:   Consistent CHO (Diabetic) [4]       Assessment:  Active Hospital Problems    Diagnosis   • *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)   • Leukocytosis   • Urinary retention   • Paroxysmal A-fib (HCC)   • End stage renal disease on dialysis (HCC)   • Type 2 diabetes mellitus with kidney complication, with long-term current use of insulin (HCC)   • Essential hypertension   • Anemia   • Hyperthyroidism   • Dyslipidemia   • Hypokalemia   • Impaired mobility and ADLs     This patient is a 75 y.o. male admitted for acute inpatient rehabilitation with 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).    I led and attended the weekly conference, and agree with the IDT conference documentation and plan of care as noted below.    Date of conference: 1/25/2021    Goals and barriers: See IDT note.    Biggest barriers: doesn't have 24/7 at home as family works    Goals in next week: discharge to skilled, right BKA, left knee ana, poor safety awareness    CM/social support: ex-wife and son work during the day    Anticipated DC date: 1/27 SNF    Follow up: PCP, surgery, Dr. Elliott, urology, nephrology    Medical Decision Making and Plan:    Right BKA  12/31 Dr. Lynn  Continue full rehab program  PT/OT/SLP, 1 hr each discipline, 5 days per week     Outpatient follow-up for suture removal after discharge with surgery    Outpatient follow-up  with Dr. Elliott, referral made    Phantom pain, resolved  Continue gabapentin 300 mg  Outpatient follow-up with Dr. Elliott, referral made     Encephalopathy, resolved  Speech therapy for cognitive assessment  Currently only with mild memory deficits    ?Depression, not found  Psychology consult, Dr Izaguirre, appreciate assistance     Urinary retention, improved  Had very high residuals  Continue intermittent caths for over 400 cc - last required 1/24  Started Flomax 0.4 mg 1/14 --> 0.8 mg 1/16  PRN lidocaine jelly for caths  Placed indwelling catheter for bladder rest 1/17, removed 1/23, voiding improved  Referral to urology made    Bowel program  History of diabetic gastroparesis  Continue bowel medications  Scheduled Sennakot  PRN Miralax, MOM, bisacodyl suppository  Last BM 1/26    DVT prophylaxis  Eliquis     Appreciate the assistance of the hospitalist with his medical comorbidities:     End-stage renal disease on hemodialysis  Consulted nephrology  Continue calcitriol 0.25 mcg daily  Continue Retacrit with dialysis  Continue K-Phos 250 mg 2 times daily     Diabetes with hyperglycemia  Sliding scale insulin  Lantus 5 units daily --> 8 units 1/19     Peripheral artery disease  Continue Eliquis, renal dosing  Continue statin     Coronary artery disease  With history of stents  Continue statin     Paroxysmal atrial fibrillation  Continue Eliquis, renal dosing  Discontinued metoprolol      Hypertension  Hypotension  Discontinued metoprolol     Hyperlipidemia  Continue statin     Hyperthyroidism  Continue methimazole 7.5 mg in the morning, 5 mg in the evening     GERD  Continue omeprazole 20 mg     Anemia  Likely of chronic renal disease  Continue Retacrit     Leukocytosis, resolved  Recently treated with IV antibiotics for pneumonia  Negative urinalysis and chest x-ray  Negative blood cultures    Elevated procalcitonin, improved  Recently treated with IV antibiotics for pneumonia    Penile discharge/balanitis  Culture  with yeast, status post Diflucan    COVID negative 1/11, re-screen 1/17 negative    Total time:  16 minutes.  I spent greater than 50% of the time for patient care, counseling, and coordination on this date, including patient face-to face time, unit/floor time with review of records/pertinent lab data and studies, as well as discussing diagnostic evaluation/work up, planned therapeutic interventions, and future disposition of care, as per the interval events/subjective and the assessment and plan as noted above.    I have performed a physical exam, reviewed and updated ROS, as well as the assessment and plan today 1/26/2021. In review of note from 1/25/21 there are no new changes except as documented above.          Lata Goodman M.D.   Physical Medicine and Rehabilitation

## 2021-01-26 NOTE — DISCHARGE PLANNING
CM spoke with patient to get SNF choice and he chose [facility], first choice and [facility] as second choice.  CM will continue to follow for DC needs.

## 2021-01-26 NOTE — THERAPY
Speech Language Pathology  Daily Treatment     Patient Name: Luis Sepulveda  Age:  75 y.o., Sex:  male  Medical Record #: [id number]  Today's Date: 1/26/2021     Precautions  Precautions: Fall Risk, Non Weight Bearing Right Lower Extremity  Comments: R BKA, R IPOP, dialysis Tues, Thurs, Sat, L arm fistula, skin breakdown L heel    Subjective    Patient was willing to participate.      Objective       01/26/21 1032   Cognition   Functional Math / Financial Management Supervision (5)   SLP Total Time Spent   SLP Individual Total Time Spent (Mins) 30   Treatment Charges   SLP Cognitive Skill Development First 15 Minutes 1   SLP Cognitive Skill Development Additional 15 Minutes 1       Assessment    Initiated check balancing task. Extra time needed. Patient was able to organize check ledger independently. Task was not completed during session and patient requested to finish outside of therapy time.     Strengths: Able to follow instructions, Alert and oriented, Good insight into deficits/needs, Making steady progress towards goals, Independent prior level of function, Motivated for self care and independence, Pleasant and cooperative, Willingly participates in therapeutic activities  Barriers: Impaired functional cognition(decreased short term memory, mild decreased attention)    Plan    Review check balancing task and correct as needed.     Speech Therapy Problems     Problem: Memory STGs     Dates: Start: 01/14/21       Goal: STG-Within one week, patient will     Dates: Start: 01/14/21       Description: 1) Individualized goal:  complete verbal recall tasks with brief (5-10 minute) delay with 70% accuracy provided MIN cues.   2) Interventions:  SLP Self Care / ADL Training , SLP Cognitive Skill Development, and SLP Group Treatment                Problem: Speech/Swallowing LTGs     Dates: Start: 01/14/21       Goal: LTG-By discharge, patient will     Dates: Start: 01/14/21       Description: 1) Individualized  goal:  complete functional problem solving and recall information with 80% accuracy and MOD I.   2) Interventions:  SLP Self Care / ADL Training , SLP Cognitive Skill Development, and SLP Group Treatment

## 2021-01-26 NOTE — DISCHARGE PLANNING
Per Vanna at [facility] and Valerie at [facility], referrals declined as they are unable to accommodate dialysis patients at this time.  SNF referrals sent to [facility] per choice form.  Awaiting responses.

## 2021-01-26 NOTE — THERAPY
Occupational Therapy  Daily Treatment     Patient Name: Luis Sepulveda  Age:  75 y.o., Sex:  male  Medical Record #: [id number]  Today's Date: 1/26/2021     Precautions  Precautions: (P) Fall Risk, Non Weight Bearing Right Lower Extremity  Comments: (P) R BKA, R IPOP, dialysis Tues, Thurs, Sat, L arm fistula, skin breakdown L heel    Safety   ADL Safety : Modified Independent, Requires Supervision for Safety  Bathroom Safety: Requires Supervision for Safety  Comments: See below notes for ADL performance details.    Subjective    Pt agreeable to OT session.     Objective       01/26/21 0831   Precautions   Precautions Fall Risk;Non Weight Bearing Right Lower Extremity   Comments R BKA, R IPOP, dialysis Tues, Thurs, Sat, L arm fistula, skin breakdown L heel   Cognition    Level of Consciousness Alert   Functional Level of Assist   Grooming Independent;Seated   Grooming Description Seated in wheelchair at sink   Upper Body Dressing Independent   Upper Body Dressing Description Other (comment)  (seated EOB to don/doff pullover shirt)   Toileting Supervision   Toileting Description Grab bar;Increased time;Supervision for safety;Verbal cueing   Toilet Transfers Supervised   Toilet Transfer Description Grab bar;Increased time;Supervision for safety;Verbal cueing  (w/c<>toilet SPT with GB)   Sitting Upper Body Exercises   Tricep Press 3 sets of 15;Bilateral;Weight (See Comments for lbs)  (Rickshaw facing fwd 3x15 with 35#, facing bwd 3x15 with 20#)   Upper Extremity Bike Level 2 Resistance  (FluidoBike for UB strength/endurance x15 min, 0 RB, 1.25km)   Interdisciplinary Plan of Care Collaboration   IDT Collaboration with  Certified Nursing Assistant   Patient Position at End of Therapy Seated;Chair Alarm On;Self Releasing Lap Belt Applied;Call Light within Reach;Tray Table within Reach;Phone within Reach   Collaboration Comments CNA for bladder scan   OT Total Time Spent   OT Individual Total Time Spent (Mins) 60   OT  Charge Group   OT Self Care / ADL 2   OT Therapeutic Exercise  2       Assessment    Pt tolerated OT session well with focus on ADLs and UB strength/endurance building. Pt demonstrated improved safety with toilet transfers this session, but con't to require supervision and intermittent cues for set up/safety. He is making progress with overall strength and endurance.     Strengths: Alert and oriented, Effective communication skills, Independent prior level of function, Making steady progress towards goals, Pleasant and cooperative, Supportive family, Willingly participates in therapeutic activities  Barriers: Decreased endurance, Fatigue, Impaired balance(safety awareness)    Plan    ADLs, IADLs and related functional mobility, Threshold shower transfers, BUE/Core strength, standing mike/balance.    Occupational Therapy Goals     Problem: Functional Transfers     Dates: Start: 01/25/21       Goal: STG-Within one week, patient will transfer to step in shower     Dates: Start: 01/25/21       Description: 1) Individualized Goal: supervision with AE/DME as needed  2) Interventions:  OT Group Therapy, OT Self Care/ADL, OT Cognitive Skill Dev, OT Community Reintegration, OT Manual Ther Technique, OT Neuro Re-Ed/Balance, OT Therapeutic Activity, OT Evaluation, and OT Therapeutic Exercise                  Problem: IADL's     Dates: Start: 01/25/21       Goal: STG-Within one week, patient will access kitchen area     Dates: Start: 01/25/21       Description: 1) Individualized Goal:  supervision from w/c level.    2) Interventions:  OT Group Therapy, OT Self Care/ADL, OT Cognitive Skill Dev, OT Community Reintegration, OT Manual Ther Technique, OT Neuro Re-Ed/Balance, OT Therapeutic Activity, OT Evaluation, and OT Therapeutic Exercise                  Problem: OT Long Term Goals     Dates: Start: 01/14/21       Goal: LTG-By discharge, patient will complete basic self care tasks     Dates: Start: 01/14/21       Description: 1)  Individualized Goal:  with mod I and AE as needed  2) Interventions:  OT Group Therapy, OT Self Care/ADL, OT Cognitive Skill Dev, OT Community Reintegration, OT Manual Ther Technique, OT Neuro Re-Ed/Balance, OT Therapeutic Activity, OT Evaluation, and OT Therapeutic Exercise      Note:     Goal Note filed on 01/14/21 1519 by Zeina Taylor MS,OTR/L    1) Individualized Goal:  with mod I and AE as needed  2) Interventions:  OT Group Therapy, OT Self Care/ADL, OT Cognitive Skill Dev, OT Community Reintegration, OT Manual Ther Technique, OT Neuro Re-Ed/Balance, OT Therapeutic Activity, OT Evaluation, and OT Therapeutic Exercise                   Goal: LTG-By discharge, patient will perform bathroom transfers     Dates: Start: 01/14/21       Description: 1) Individualized Goal:  with mod I and AE as needed  2) Interventions:  OT Group Therapy, OT Self Care/ADL, OT Cognitive Skill Dev, OT Community Reintegration, OT Manual Ther Technique, OT Neuro Re-Ed/Balance, OT Therapeutic Activity, OT Evaluation, and OT Therapeutic Exercise       Note:     Goal Note filed on 01/14/21 1519 by Zeina Taylor MS,OTR/L    1) Individualized Goal:  with mod I and AE as needed  2) Interventions:  OT Group Therapy, OT Self Care/ADL, OT Cognitive Skill Dev, OT Community Reintegration, OT Manual Ther Technique, OT Neuro Re-Ed/Balance, OT Therapeutic Activity, OT Evaluation, and OT Therapeutic Exercise

## 2021-01-26 NOTE — PROGRESS NOTES
"[facility] Nephrology Progress Note    Author: Kamryn ECKERT  Collaborating Physician: Kulwant Hodges MD    Date of Service  1/26/2021    Chief Complaint  A 75-year-old male with end-stage renal disease   on chronic hemodialysis Tuesdays, Thursdays and Saturdays.  The patient was   admitted on 12/31 for right below-the-knee amputation.  He had wound and   gangrene of the right foot.  He has had some peripheral vascular procedures   previously.  He did have a right ray amputation in 11/2020.  He has had a   nonhealing wound and he was admitted for right below-the-knee amputation that   was performed on 12/31.  We have been consulted to assist in his dialysis   needs.  He had dialysis on 12/31 prior to admission.  Today, he is feeling   dyspneic.  He has no cough or sputum production.    Daily Nephrology Summary   1/13- Transferred to Rehab  1/14 - Pt sitting up in W/C in room, denies any complaints/concerns, due for HD today, BP labile, K+ 3.1, per chart review-penile discharge cx sent  1/15 - Pt getting up to restroom to shower with assistance, HD yesterday with 2.1L UF, K+ corrected to 3.6, iron stores replete, no growth on penile wound cx, VSS on RA   1/16 - No overnight events, HD later today.  No labs for review.  Sitting up at bedside eating lunch.  No complaints.   1/18 - No new events. Pt with palacios. C/O GI issues, bowels are \"loose\". No new events.   1/19 - HD today. VSS. No new events.Pt seen during therapy. C/O about palacios catheter.  1/20 - HD yest, UF 1.7L. Occult blood stool +. CXR improved. Abd xray no obstruction. Abd pain improved after 2 large BMs. C/O palacios catheter.  1/21 - Voiding trial on Mon. Due for HD today. Sleepy today 2/2 sleeping pill last hs, slept very well.  1/21 - Pt no longer with abd pain. C/O \"sore bottom\" and palacios catheter discomfort.  1/22 - Due to HD this am. Pt feels pretty good. Palacios removed. Pt denies pain but more discomfort once he gets up moving around. No abd " discomfort this am.  01/25- Up in wheelchair, feeling well.  No pain, n/v, sob.  Reports he was able to urinate this am.  No overnight events.   01/26 - Laying in bed resting comfortably.  HD later today VSS, UOP ~ 375.  No overnight events.     Review of Systems  Review of Systems   Constitutional: Positive for malaise/fatigue. Negative for chills and fever.   HENT: Negative.    Eyes: Negative.    Respiratory: Negative.    Cardiovascular: Negative.    Gastrointestinal: Negative.  Negative for abdominal pain, nausea and vomiting.   Genitourinary:        Tomas catheter   Musculoskeletal: Positive for joint pain. Negative for back pain and myalgias.        C/O RLE phantom pain   Skin: Negative.    Neurological: Negative.    Endo/Heme/Allergies: Negative.    Psychiatric/Behavioral: Negative.       Physical Exam  Temp:  [36.6 °C (97.8 °F)-36.6 °C (97.9 °F)] 36.6 °C (97.8 °F)  Pulse:  [81-85] 85  Resp:  [16-18] 18  BP: (122-149)/(53-69) 122/69  SpO2:  [96 %-100 %] 96 %    Physical Exam  Constitutional:       Appearance: Normal appearance.   HENT:      Head: Normocephalic and atraumatic.      Nose: Nose normal.      Mouth/Throat:      Mouth: Mucous membranes are moist.      Pharynx: Oropharynx is clear.   Eyes:      Extraocular Movements: Extraocular movements intact.      Pupils: Pupils are equal, round, and reactive to light.   Neck:      Musculoskeletal: Normal range of motion and neck supple.   Cardiovascular:      Rate and Rhythm: Normal rate and regular rhythm.      Pulses: Normal pulses.      Comments: L AVF +T/B  Pulmonary:      Effort: Pulmonary effort is normal.      Breath sounds: Normal breath sounds.   Abdominal:      General: Bowel sounds are normal.      Palpations: Abdomen is soft.   Musculoskeletal:         General: No deformity.      Comments: R BKA    Skin:     General: Skin is warm and dry.   Neurological:      General: No focal deficit present.      Mental Status: He is alert and oriented to person,  place, and time.   Psychiatric:         Mood and Affect: Mood normal.         Behavior: Behavior normal.         Fluids    Intake/Output Summary (Last 24 hours) at 1/26/2021 0759  Last data filed at 1/26/2021 0332  Gross per 24 hour   Intake 720 ml   Output 375 ml   Net 345 ml       Laboratory  Recent Labs     01/26/21  0528   WBC 6.5   RBC 2.78*   HEMOGLOBIN 8.8*   HEMATOCRIT 27.6*   MCV 99.3*   MCH 31.7   MCHC 31.9*   RDW 55.8*   PLATELETCT 321   MPV 9.1     Recent Labs     01/26/21  0528   SODIUM 132*   POTASSIUM 4.5   CHLORIDE 95*   CO2 24   GLUCOSE 136*   BUN 44*   CREATININE 6.59*   CALCIUM 9.1         No results for input(s): NTPROBNP in the last 72 hours.        Imaging  Available labs, imaging and clinical documentation reviewed.     Assessment/Plan  #  End-stage renal disease, on chronic hemodialysis qTTS  - Normally dialyzes at [facility] via L AVF   #  Status post right BKA on 12/31/2020 for diabetic ulcer.  #  Hypertension  - Currently on no BP medications, BP less than 140/90   #  Anemia: superimposed blood loss in addition to chronic kidney disease.  - Hgb below target   - On ARMANDO   - Iron replete, ferritin elevated   #  Diabetes mellitus.  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  - On calcitriol and calcium acetate   - Calcium acetate currently being held   - Vit D 48   - Phos 4.1  - iPTH 109   #  Peripheral vascular disease.   #  Paroxysmal atrial fibrillation, on Eliquis and metoprolol.   #  Coronary artery disease, status post stents.  Asymptomatic.  #  History of hyperthyroidism. On methimazole.   #  Dyslipidemia, on statin therapy.   #  Past history of heavy tobacco use.  #  Pneumonia: s/p IV Zosyn   #  Encephalopathy: resolving  #  Weakness/debility  #  Urinary retention requiring catheter placement  - Chronic oliguria  - Tomas cath removed   - Voiding trail in effect   #  Hypokalemia, corrected  #  Hyponatremia in ESRD, mild, improved  #  Penile meatus wound  - NGTD on cx       PLAN:  -Hemodialysis today (TUE)   -Continue hemodialysis qTTS/PRN  -UF as tolerated  -ARMANDO TIW with HD, goal Hgb 10-11   -Transfuse PRN Hgb <7  -Goal BP <140/90  -No dietary protein restrictions  -Dose meds for ESRD  -Limit narcotics/sedating meds  -PT/OT/Speech  - Labs Thursday   -Ok to transition to outpatient hemodialysis from Nephrology standpoint when medically cleared.     Thank you,

## 2021-01-26 NOTE — CONSULTS
BRIEF PSYCHOLOGY NOTE: Attempted to meet with the patient however he was not in his room. Will try again on Friday 1/29 if he is still in the rehabilitation hospital setting.

## 2021-01-27 PROBLEM — R11.2 NAUSEA & VOMITING: Status: ACTIVE | Noted: 2021-01-01

## 2021-01-27 NOTE — THERAPY
"Occupational Therapy  Daily Treatment     Patient Name: Luis Sepulveda  Age:  75 y.o., Sex:  male  Medical Record #: [id number]  Today's Date: 1/27/2021     Precautions  Precautions: (P) Fall Risk, Non Weight Bearing Right Lower Extremity  Comments: (P) R BKA, R IPOP, dialysis Tues, Thurs, Sat, L arm fistula, skin breakdown L heel    Safety   ADL Safety : (P) Modified Independent, Requires Supervision for Safety  Bathroom Safety: (P) Requires Supervision for Safety  Comments: (P) See below notes for ADL performance details.    Subjective    \"I don't understand why I feel so awful today\"    Pt seen for OT from 9-9:30 and 10:30-11:30.      Objective       01/27/21 1031   Precautions   Precautions Fall Risk;Non Weight Bearing Right Lower Extremity   Comments R BKA, R IPOP, dialysis Tues, Thurs, Sat, L arm fistula, skin breakdown L heel   Safety    ADL Safety  Modified Independent;Requires Supervision for Safety   Bathroom Safety Requires Supervision for Safety   Comments See below notes for ADL performance details.   Functional Level of Assist   Eating Independent   Grooming Independent;Seated   Grooming Description Seated in wheelchair at sink   Bathing Modified Independent   Bathing Description Grab bar;Hand held shower;Tub bench;Increased time   Upper Body Dressing Independent   Lower Body Dressing Supervision   Lower Body Dressing Description Grab bar;Increased time;Set-up of equipment;Supervision for safety;Verbal cueing  (SBA to don/doff brief, pants and L sock)   Toileting Supervision   Toileting Description Grab bar;Increased time;Supervision for safety;Verbal cueing   Toilet Transfers Supervised   Toilet Transfer Description Grab bar;Increased time;Set-up of equipment;Supervision for safety;Verbal cueing  (w/c<>toilet SPT with GB)   Tub / Shower Transfers Supervised   Tub Shower Transfer Description Grab bar;Shower bench;Increased time;Set-up of equipment;Supervision for safety;Verbal cueing  (w/c<>shower " bench SPT with GB and supv)   Interdisciplinary Plan of Care Collaboration   IDT Collaboration with  Nursing;Physician   Patient Position at End of Therapy Seated;Chair Alarm On;Self Releasing Lap Belt Applied;Call Light within Reach;Tray Table within Reach;Phone within Reach   Collaboration Comments RN/Dr. Goodman re: pt c/o nausea, vomiting   OT Total Time Spent   OT Individual Total Time Spent (Mins) 90   OT Charge Group   OT Self Care / ADL 6       Assessment    Pt reports poor night sleep and not feeling well, but requested to complete shower. Pt able to complete ADLs and bathroom transfers at supv-mod I level. Con't to require occasional cues for safety, and forgot to lock w/c brakes during toilet transfer. D/c to SNF pending, as pt feeling nauseous and vomiting at end of session-Dr Goodman and nursing aware.       Strengths: Alert and oriented, Effective communication skills, Independent prior level of function, Making steady progress towards goals, Pleasant and cooperative, Supportive family, Willingly participates in therapeutic activities  Barriers: Decreased endurance, Fatigue, Impaired balance(safety awareness)    Plan    D/c to SNF pending today.     Occupational Therapy Goals     Problem: Functional Transfers     Dates: Start: 01/25/21       Goal: STG-Within one week, patient will transfer to step in shower     Dates: Start: 01/25/21       Description: 1) Individualized Goal: supervision with AE/DME as needed  2) Interventions:  OT Group Therapy, OT Self Care/ADL, OT Cognitive Skill Dev, OT Community Reintegration, OT Manual Ther Technique, OT Neuro Re-Ed/Balance, OT Therapeutic Activity, OT Evaluation, and OT Therapeutic Exercise                  Problem: IADL's     Dates: Start: 01/25/21       Goal: STG-Within one week, patient will access kitchen area     Dates: Start: 01/25/21       Description: 1) Individualized Goal:  supervision from w/c level.    2) Interventions:  OT Group Therapy, OT Self  Care/ADL, OT Cognitive Skill Dev, OT Community Reintegration, OT Manual Ther Technique, OT Neuro Re-Ed/Balance, OT Therapeutic Activity, OT Evaluation, and OT Therapeutic Exercise                  Problem: OT Long Term Goals     Dates: Start: 01/14/21       Goal: LTG-By discharge, patient will complete basic self care tasks     Dates: Start: 01/14/21       Description: 1) Individualized Goal:  with mod I and AE as needed  2) Interventions:  OT Group Therapy, OT Self Care/ADL, OT Cognitive Skill Dev, OT Community Reintegration, OT Manual Ther Technique, OT Neuro Re-Ed/Balance, OT Therapeutic Activity, OT Evaluation, and OT Therapeutic Exercise      Note:     Goal Note filed on 01/14/21 1519 by Zeina Taylor MS,OTR/L    1) Individualized Goal:  with mod I and AE as needed  2) Interventions:  OT Group Therapy, OT Self Care/ADL, OT Cognitive Skill Dev, OT Community Reintegration, OT Manual Ther Technique, OT Neuro Re-Ed/Balance, OT Therapeutic Activity, OT Evaluation, and OT Therapeutic Exercise                   Goal: LTG-By discharge, patient will perform bathroom transfers     Dates: Start: 01/14/21       Description: 1) Individualized Goal:  with mod I and AE as needed  2) Interventions:  OT Group Therapy, OT Self Care/ADL, OT Cognitive Skill Dev, OT Community Reintegration, OT Manual Ther Technique, OT Neuro Re-Ed/Balance, OT Therapeutic Activity, OT Evaluation, and OT Therapeutic Exercise       Note:     Goal Note filed on 01/14/21 1519 by Zeina Taylor MS,OTR/L    1) Individualized Goal:  with mod I and AE as needed  2) Interventions:  OT Group Therapy, OT Self Care/ADL, OT Cognitive Skill Dev, OT Community Reintegration, OT Manual Ther Technique, OT Neuro Re-Ed/Balance, OT Therapeutic Activity, OT Evaluation, and OT Therapeutic Exercise

## 2021-01-27 NOTE — ASSESSMENT & PLAN NOTE
(1/27): had 2 episodes of N/V   (1/29): no N/V since 1/27  Has been afebrile  No leukocytosis  AXR (1/27): unremarkable  U/A: negative  Pt feels that it may be from his dialysis -- he often gets lightheaded/woozy after dialysis  Monitor

## 2021-01-27 NOTE — DISCHARGE SUMMARY
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)   • Abrasion of penis with infection   • Urinary retention   • Paroxysmal A-fib (HCC)   • End stage renal disease on dialysis (HCC)   • Type 2 diabetes mellitus with kidney complication, with long-term current use of insulin (HCC)   • Essential hypertension   • Anemia   • Hyperthyroidism   • Dyslipidemia   • Abdominal pain   • Leukocytosis   • Orthostatic hypotension   • Hypokalemia   • Impaired mobility and ADLs       Discharge Diagnosis:  Active Hospital Problems    Diagnosis   • *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 male with a past medical history of diabetes, end-stage renal disease on hemodialysis, peripheral artery disease, coronary artery disease, paroxysmal atrial fibrillation, hyperthyroidism, hyperlipidemia; now admitted for acute inpatient rehabilitation with severe functional debility after he required right below the knee amputation.       On admission the patient and medical record report, patient had a history of a right diabetic foot infection that had an amputation of the toe on 11/19/20.  This was not healing and so he was admitted to the hospital on 12/31/20 for an elective right below the knee amputation that occurred with Dr. Lynn.  Postoperatively patient had encephalopathy, was  treated for pneumonia with IV Zosyn, required transfusion for acute blood loss anemia, and also had acute urinary retention requiring catheter placement.  He continued on his hemodialysis days Tuesday Thursday Saturday.  Patient had Covid testing which was negative on 1/4 and 1/22.  Surgery deferred DVT prophylaxis to the primary team.  Patient has not yet been started on Eliquis which was his home anticoagulation.     Patient current reports buttock pain from being in the hospital, laying in bed for so long.  He denies any significant residual limb pain.  Of note he reports occasionally when he bumps his left foot he actually feels pain where his right foot used to be.       He reports that he was upset his oral intake was limited at the acute hospital after a pill got stuck 1 day.  He was unable to have thin liquids or any food he reports for 5 days which she did not think was necessary.  He also confirms that he was confused at the other hospital postoperatively, and that he got angry because he really believed things were happening to him that the staff assured him were not.  He states he was angry at their disbelief.  He denies having any more confusion.     Patient reports that he was making a small amount of urine in the past year since starting dialysis.  Over at the acute hospital he reports they placed a catheter which was painful, and then they removed it.  He has not urinated since this removal.  He reports history of peripheral neuropathy associated with his diabetes, and also associated he thinks with his exposure to agent orange in the [place] War.  He has neuropathy in his feet as well as his bilateral fingers.  Patient wears glasses for reading.  He is right-hand dominant.     Patient was evaluated by Rehab Medicine physician and Physical Therapy, Occupational Therapy and Speech Therapy and determined to be appropriate for acute inpatient rehab and was transferred to [facility]  on 1/13/2021 10:52 AM.    Rehab Hospital Course by Problem List:    Right BKA  12/31 Dr. Lynn     Outpatient follow-up for suture removal after discharge with surgery     Outpatient follow-up with Dr. Elliott/PMR, referral made     Phantom pain, resolved  Continue gabapentin 300 mg  Outpatient follow-up with Dr. Elliott, referral made     Encephalopathy, resolved  Speech therapy for cognitive assessment  Currently only with mild memory deficits     ?Depression, not found  Psychology consult, Dr Izaguirre, appreciated assistance     Urinary retention, improved/continues  Likely with BPH  Had very high residuals, improved with Max dose Flomax  Continue intermittent caths for over 400 cc - last required 1/24  Started Flomax 0.4 mg 1/14 --> 0.8 mg 1/16  PRN lidocaine jelly for caths  Placed indwelling catheter for bladder rest 1/17, removed 1/23, voiding improved  Referral to urology made     Bowel program  History of diabetic gastroparesis  Continue bowel medications  PRN Sennakot  PRN Miralax, MOM, bisacodyl suppository  Last BM 1/27     DVT prophylaxis  Eliquis     Appreciated the assistance of the hospitalist with his medical comorbidities:     End-stage renal disease on hemodialysis  Consulted nephrology  Continue calcitriol 0.25 mcg daily  Continue Retacrit with dialysis     Diabetes with hyperglycemia  Sliding scale insulin  Lantus 5 units daily --> 8 units 1/19     Peripheral artery disease  Continue Eliquis, renal dosing  Continue statin     Coronary artery disease  With history of stents  Continue statin     Paroxysmal atrial fibrillation  Continue Eliquis, renal dosing  Discontinued metoprolol      Hypertension  Hypotension, improved  Discontinued metoprolol     Hyperlipidemia  Continue statin     Hyperthyroidism  Continue methimazole 7.5 mg in the morning, 5 mg in the evening     GERD  Continue omeprazole 20 mg     Anemia  Likely of chronic renal disease  Continue Retacrit     Leukocytosis, resolved  Recently  treated with IV antibiotics for pneumonia  Negative urinalysis and chest x-ray  Negative blood cultures     Elevated procalcitonin, improved  Recently treated with IV antibiotics for pneumonia     Penile discharge/balanitis  Culture with yeast, status post Diflucan     COVID negative 1/11, re-screen 1/17 negative    ADDENDUM 1/29 - Patient initially scheduled for discharge on 1/27 but developed acute nausea/emesis night of 1/26 after his dialysis session. KUB negative, labs stable. His symptoms resolved on 1/28, he was able to eat, and had another dialysis session on 1/28 without any recurrence of these symptoms.     Functional Status at Discharge  Eating:  Independent  Eating Description:     Grooming:  Independent, Seated  Grooming Description:  Seated in wheelchair at sink  Bathing:  Modified Independent  Bathing Description:  Grab bar, Hand held shower, Long handled bath tool, Tub bench, Increased time  Upper Body Dressing:  Independent  Upper Body Dressing Description:  Other (comment)(seated EOB to don/doff pullover shirt)  Lower Body Dressing:  Supervision  Lower Body Dressing Description:  Grab bar, Increased time, Reacher(SBA to don/doff brief, pants and L sock)     Walk:  Contact Guard Assist  Distance Walked:  25  Number of Times Distance Was Traveled:  1  Assistive Device:  Front Wheel Walker  Gait Deviation:  Other (Comment)(Hop to pattern, close wc follow for safety)  Wheelchair:  Modified Independent  Distance Propelled:  150   Wheelchair Description:  Adaptive equipment, Extra time  Stairs Unable to Participate  Stairs Description       Comprehension:  Independent  Comprehension Description:  Glasses  Expression:  Independent  Expression Description:  Verbal cueing  Social Interaction:  Modified Independent  Social Interaction Description:  Verbal cues  Problem Solving:  Supervision  Problem Solving Description:  Therapy schedule, Verbal cueing, Increased time  Memory:  Minimal Assist  Memory  Description:  Verbal cueing, Therapy schedule, Increased time, Supervision       Lata CHERRY M.D., personally performed a complete drug regimen review and no potential clinically significant medication issues were identified.     Discharge Medication:     Medication List      START taking these medications      Instructions   gabapentin 300 MG Caps  Commonly known as: NEURONTIN   Take 1 Cap by mouth every day.  Dose: 300 mg     omeprazole 20 MG delayed-release capsule  Commonly known as: PRILOSEC   Take 1 Cap by mouth 2 Times a Day.  Dose: 20 mg     tamsulosin 0.4 MG capsule  Commonly known as: FLOMAX   Take 2 Caps by mouth every bedtime.  Dose: 0.8 mg     traZODone 50 MG Tabs  Commonly known as: DESYREL   Take 1 Tab by mouth at bedtime as needed.  Dose: 50 mg        CHANGE how you take these medications      Instructions   Lantus SoloStar 100 UNIT/ML Sopn injection  What changed:   · how much to take  · when to take this  Generic drug: insulin glargine   Inject 8 Units under the skin every morning.  Dose: 8 Units        CONTINUE taking these medications      Instructions   acetaminophen 325 MG Tabs  Commonly known as: Tylenol   Take 650 mg by mouth every four hours as needed.  Dose: 650 mg     apixaban 2.5mg Tabs  Commonly known as: Eliquis   Take 1 Tab by mouth 2 Times a Day.  Dose: 2.5 mg     atorvastatin 40 MG Tabs  Commonly known as: LIPITOR   Take 1 Tab by mouth every bedtime.  Dose: 40 mg     calcitRIOL 0.25 MCG Caps  Commonly known as: ROCALTROL   Take 0.25 mcg by mouth every day.  Dose: 0.25 mcg     methimazole 5 MG Tabs  Commonly known as: TAPAZOLE   TAKE 1.5 TABLETS BY MOUTH  IN THE MORNING AND 1 TABLET IN THE  EVENING        STOP taking these medications    calcium acetate 667 MG Caps  Commonly known as: PHOS-LO     carvedilol 3.125 MG Tabs  Commonly known as: COREG     guaiFENesin  MG Tb12  Commonly known as: Mucinex     ondansetron 4 MG Tbdp  Commonly known as: ZOFRAN ODT     Protonix  40 MG Tbec  Generic drug: pantoprazole     senna-docusate 8.6-50 MG Tabs  Commonly known as: PERICOLACE or SENOKOT S            Discharge Diet:  Renal    Discharge Activity:  Do not return to work or driving until cleared by a physician.         Disposition:  Patient to discharge to SNF for rehabilitation as his balance is impaired, as well as his safety awareness, and his family works during the day.    Follow-up & Discharge Instructions:    Surgery, PMR, urology, nephrology    Condition on Discharge:  Good    More than 35 minutes was spent on discharging this patient, including face-to-face time, prescription management, and the dictation of this note.    Lata Goodman M.D.    Date of Service: 1/27/2021

## 2021-01-27 NOTE — THERAPY
Missed Therapy     Patient Name: Luis Sepulveda  Age:  75 y.o., Sex:  male  Medical Record #: [id number]  Today's Date: 1/27/2021    Discussed missed therapy with RN, MD and . Pt with continued nausea, pt was to d/c on this date however d/c is now on hold.        01/27/21 1303   Therapy Missed   Missed Therapy (Minutes) 30   Reason For Missed Therapy Medical - Patient with Nursing;Medical - Patient has Nausea / Vomiting  (Pt with nausea, d/c on hold, pt on MED HOLD)

## 2021-01-27 NOTE — PROGRESS NOTES
[facility] Progress Note     HD treatment ordered by Dr Hodges x 3.5 hours.  Treatment Start time: 1736          End time: 2107       Net UF 3000 ml    Patient tolerated treatment well. VS stable all through out. All blood was returned. LUE AVF needle removed. Dry gauze dressing applied and changed without bleeding issue. + Bruit/Thrill pre-post Treatment.   See paper flow sheet for details.     Report given to Rebeca MONTOYA.

## 2021-01-27 NOTE — CARE PLAN
Problem: Urinary Elimination:  Goal: Ability to reestablish a normal urinary elimination pattern will improve  Intervention: Record post-void residual volumes  Note: Pt is continent of bladder..Will continue to monitor.     Problem: GLYCEMIA IMBALANCE  Goal: Clinical indication of glycemia balance is achieved  Intervention: MONITOR BLOOD GLUCOSE LEVELS AS ORDERED  Note: FSBS 149, coverage not needed.Snack given.Will continue to monitor and assess for s/sx of hyper/hypoglycemia.

## 2021-01-27 NOTE — THERAPY
Missed Therapy     Patient Name: Luis Sepulveda  Age:  75 y.o., Sex:  male  Medical Record #: [id number]  Today's Date: 1/27/2021    Discussed missed therapy with therapy , nursing. Per RN pt fatigued from dialysis and poor sleep last night. Pt scheduled for extra therapy, so medical hold not needed at this time.        01/27/21 0831   Therapy Missed   Missed Therapy (Minutes) 30   Reason For Missed Therapy Medical - Other (Please Comment)  (pt lethargic from dialysis; scheduled for extra therapy)

## 2021-01-27 NOTE — CARE PLAN
Problem: Recreation Therapy  Goal: STG-Within one week, patient will demonstrate a standing tolerance  Description: By standing and dual tasking with a cognitive leisure activity for 3 minutes X3 Min A without LOB.   Outcome: DISCHARGED-GOAL NOT MET  Goal: LTG-By discharge, patient will demonstrate a standing tolerance  Description: By standing and dual tasking with a cognitive leisure activity for 5 minutes X3 CGA without LOB.   Outcome: DISCHARGED-GOAL NOT MET     Pt was progressing well and stood for 5 minutes X1 CGA in last session. Pt was progressing well following his palacios being removed.

## 2021-01-27 NOTE — PROGRESS NOTES
"Rehab Progress Note     Date of Service: 1/27/2021  Chief Complaint: Follow-up BKA    Interval Events (Subjective)    Patient seen and examined in his room this morning.  He has been vomiting since last night and continues to feel very nauseous.  He sometimes has nauseousness after dialysis but does not usually throw up.  Discharge currently on hold.    Objective:  VITAL SIGNS: /65   Pulse 91   Temp 36.5 °C (97.7 °F) (Oral)   Resp 18   Ht 1.651 m (5' 5\")   Wt 79.5 kg (175 lb 4.3 oz)   SpO2 94%   BMI 29.17 kg/m²   Gen: alert, no apparent distress  GI: Vomiting  Msk: Right transtibial amputation    Recent Results (from the past 72 hour(s))   ACCU-CHEK GLUCOSE    Collection Time: 01/24/21  5:09 PM   Result Value Ref Range    Glucose - Accu-Ck 152 (H) 65 - 99 mg/dL   ACCU-CHEK GLUCOSE    Collection Time: 01/24/21  9:44 PM   Result Value Ref Range    Glucose - Accu-Ck 150 (H) 65 - 99 mg/dL   ACCU-CHEK GLUCOSE    Collection Time: 01/25/21  7:11 AM   Result Value Ref Range    Glucose - Accu-Ck 108 (H) 65 - 99 mg/dL   ACCU-CHEK GLUCOSE    Collection Time: 01/25/21 11:26 AM   Result Value Ref Range    Glucose - Accu-Ck 145 (H) 65 - 99 mg/dL   ACCU-CHEK GLUCOSE    Collection Time: 01/25/21  5:05 PM   Result Value Ref Range    Glucose - Accu-Ck 160 (H) 65 - 99 mg/dL   ACCU-CHEK GLUCOSE    Collection Time: 01/25/21  9:50 PM   Result Value Ref Range    Glucose - Accu-Ck 143 (H) 65 - 99 mg/dL   Comp Metabolic Panel    Collection Time: 01/26/21  5:28 AM   Result Value Ref Range    Sodium 132 (L) 135 - 145 mmol/L    Potassium 4.5 3.6 - 5.5 mmol/L    Chloride 95 (L) 96 - 112 mmol/L    Co2 24 20 - 33 mmol/L    Anion Gap 13.0 7.0 - 16.0    Glucose 136 (H) 65 - 99 mg/dL    Bun 44 (H) 8 - 22 mg/dL    Creatinine 6.59 (HH) 0.50 - 1.40 mg/dL    Calcium 9.1 8.5 - 10.5 mg/dL    AST(SGOT) 9 (L) 12 - 45 U/L    ALT(SGPT) 11 2 - 50 U/L    Alkaline Phosphatase 117 (H) 30 - 99 U/L    Total Bilirubin 0.3 0.1 - 1.5 mg/dL    Albumin 3.3 " 3.2 - 4.9 g/dL    Total Protein 6.2 6.0 - 8.2 g/dL    Globulin 2.9 1.9 - 3.5 g/dL    A-G Ratio 1.1 g/dL   CBC WITH DIFFERENTIAL    Collection Time: 01/26/21  5:28 AM   Result Value Ref Range    WBC 6.5 4.8 - 10.8 K/uL    RBC 2.78 (L) 4.70 - 6.10 M/uL    Hemoglobin 8.8 (L) 14.0 - 18.0 g/dL    Hematocrit 27.6 (L) 42.0 - 52.0 %    MCV 99.3 (H) 81.4 - 97.8 fL    MCH 31.7 27.0 - 33.0 pg    MCHC 31.9 (L) 33.7 - 35.3 g/dL    RDW 55.8 (H) 35.9 - 50.0 fL    Platelet Count 321 164 - 446 K/uL    MPV 9.1 9.0 - 12.9 fL    Neutrophils-Polys 74.70 (H) 44.00 - 72.00 %    Lymphocytes 14.70 (L) 22.00 - 41.00 %    Monocytes 8.90 0.00 - 13.40 %    Eosinophils 0.30 0.00 - 6.90 %    Basophils 0.50 0.00 - 1.80 %    Immature Granulocytes 0.90 0.00 - 0.90 %    Nucleated RBC 0.00 /100 WBC    Neutrophils (Absolute) 4.86 1.82 - 7.42 K/uL    Lymphs (Absolute) 0.96 (L) 1.00 - 4.80 K/uL    Monos (Absolute) 0.58 0.00 - 0.85 K/uL    Eos (Absolute) 0.02 0.00 - 0.51 K/uL    Baso (Absolute) 0.03 0.00 - 0.12 K/uL    Immature Granulocytes (abs) 0.06 0.00 - 0.11 K/uL    NRBC (Absolute) 0.00 K/uL   ESTIMATED GFR    Collection Time: 01/26/21  5:28 AM   Result Value Ref Range    GFR If African American 10 (A) >60 mL/min/1.73 m 2    GFR If Non  8 (A) >60 mL/min/1.73 m 2   ACCU-CHEK GLUCOSE    Collection Time: 01/26/21  7:55 AM   Result Value Ref Range    Glucose - Accu-Ck 128 (H) 65 - 99 mg/dL   ACCU-CHEK GLUCOSE    Collection Time: 01/26/21 11:26 AM   Result Value Ref Range    Glucose - Accu-Ck 128 (H) 65 - 99 mg/dL   ACCU-CHEK GLUCOSE    Collection Time: 01/26/21  5:34 PM   Result Value Ref Range    Glucose - Accu-Ck 119 (H) 65 - 99 mg/dL   ACCU-CHEK GLUCOSE    Collection Time: 01/26/21  9:33 PM   Result Value Ref Range    Glucose - Accu-Ck 149 (H) 65 - 99 mg/dL   ACCU-CHEK GLUCOSE    Collection Time: 01/27/21  8:07 AM   Result Value Ref Range    Glucose - Accu-Ck 119 (H) 65 - 99 mg/dL   ACCU-CHEK GLUCOSE    Collection Time: 01/27/21 11:08  AM   Result Value Ref Range    Glucose - Accu-Ck 131 (H) 65 - 99 mg/dL       Current Facility-Administered Medications   Medication Frequency   • senna-docusate (PERICOLACE or SENOKOT S) 8.6-50 MG per tablet 2 Tab BID PRN    And   • magnesium hydroxide (MILK OF MAGNESIA) suspension 30 mL QDAY PRN    And   • bisacodyl (DULCOLAX) suppository 10 mg QDAY PRN   • miconazole 2%-zinc oxide (Dominga) topical cream BID   • omeprazole (PRILOSEC) capsule 20 mg BID   • insulin regular (HumuLIN R,NovoLIN R) injection 4X/DAY ACHS    And   • dextrose 50% (D50W) injection 50 mL Q15 MIN PRN   • insulin glargine (Lantus) injection QAM INSULIN   • tamsulosin (FLOMAX) capsule 0.8 mg QHS   • gabapentin (NEURONTIN) capsule 300 mg DAILY   • heparin injection 1,800 Units DIALYSIS PRN   • hydrOXYzine HCl (ATARAX) tablet 50 mg Q6HRS PRN   • melatonin tablet 3 mg HS PRN   • Respiratory Therapy Consult Continuous RT   • Pharmacy Consult Request ...Pain Management Review 1 Each PHARMACY TO DOSE   • acetaminophen (Tylenol) tablet 650 mg Q4HRS PRN   • lactulose 20 GM/30ML solution 30 mL QDAY PRN   • docusate sodium (ENEMEEZ) enema 283 mg QDAY PRN   • fleet enema 133 mL QDAY PRN   • artificial tears ophthalmic solution 1 Drop PRN   • benzocaine-menthol (CEPACOL) lozenge 1 Lozenge Q2HRS PRN   • mag hydrox-al hydrox-simeth (MAALOX PLUS ES or MYLANTA DS) suspension 20 mL Q2HRS PRN   • ondansetron (ZOFRAN ODT) dispertab 4 mg 4X/DAY PRN    Or   • ondansetron (ZOFRAN) syringe/vial injection 4 mg 4X/DAY PRN   • traZODone (DESYREL) tablet 50 mg QHS PRN   • sodium chloride (OCEAN) 0.65 % nasal spray 2 Spray PRN   • traMADol (ULTRAM) 50 MG tablet 50 mg Q4HRS PRN   • atorvastatin (LIPITOR) tablet 40 mg Q EVENING   • calcitRIOL (ROCALTROL) capsule 0.25 mcg DAILY   • epoetin (Retacrit) injection (Dialysis use only) 10,000 Units TUE+THU+SAT   • methimazole (TAPAZOLE) tablet 5 mg Q EVENING   • methimazole (TAPAZOLE) tablet 7.5 mg QAM   • oxyCODONE immediate-release  (ROXICODONE) tablet 5 mg Q4HRS PRN   • apixaban (ELIQUIS) tablet 2.5 mg BID       Orders Placed This Encounter   Procedures   • Diet Order Diet: Renal; Second Modifier: (optional): Consistent CHO (Diabetic)     Standing Status:   Standing     Number of Occurrences:   1     Order Specific Question:   Diet:     Answer:   Renal [8]     Order Specific Question:   Second Modifier: (optional)     Answer:   Consistent CHO (Diabetic) [4]       Assessment:  Active Hospital Problems    Diagnosis   • *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)   • Leukocytosis   • Urinary retention   • Paroxysmal A-fib (HCC)   • End stage renal disease on dialysis (HCC)   • Type 2 diabetes mellitus with kidney complication, with long-term current use of insulin (HCC)   • Essential hypertension   • Anemia   • Hyperthyroidism   • Dyslipidemia   • Hypokalemia   • Impaired mobility and ADLs     This patient is a 75 y.o. male admitted for acute inpatient rehabilitation with 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).    I led and attended the weekly conference, and agree with the IDT conference documentation and plan of care as noted below.    Date of conference: 1/25/2021    Goals and barriers: See IDT note.    Biggest barriers: doesn't have 24/7 at home as family works    Goals in next week: discharge to skilled, right BKA, left knee ana, poor safety awareness    CM/social support: ex-wife and son work during the day    Anticipated DC date: 1/29 SNF    Follow up: PCP, surgery, Dr. Elliott, urology, nephrology    Medical Decision Making and Plan:    Right BKA  12/31 Dr. Lynn  Continue full rehab program  PT/OT/SLP, 1 hr each discipline, 5 days per week     Outpatient follow-up for suture removal after discharge with surgery    Outpatient follow-up with Dr. Elliott, referral made    Phantom pain, resolved  Continue gabapentin 300 mg  Outpatient follow-up with Dr. Elliott, referral  made     Encephalopathy, resolved  Speech therapy for cognitive assessment  Currently only with mild memory deficits    ?Depression, not found  Psychology consult, Dr Izaguirre, appreciate assistance     Urinary retention, improved  Had very high residuals  Continue intermittent caths for over 400 cc - last required 1/24  Started Flomax 0.4 mg 1/14 --> 0.8 mg 1/16  PRN lidocaine jelly for caths  Placed indwelling catheter for bladder rest 1/17, removed 1/23, voiding improved  Referral to urology made    Bowel program  History of diabetic gastroparesis  Continue bowel medications  Scheduled Sennakot  PRN Miralax, MOM, bisacodyl suppository  Last BM 1/26    DVT prophylaxis  Eliquis     Appreciate the assistance of the hospitalist with his medical comorbidities:     End-stage renal disease on hemodialysis  Consulted nephrology  Continue calcitriol 0.25 mcg daily  Continue Retacrit with dialysis  Continue K-Phos 250 mg 2 times daily     Diabetes with hyperglycemia  Sliding scale insulin  Lantus 5 units daily --> 8 units 1/19     Peripheral artery disease  Continue Eliquis, renal dosing  Continue statin     Coronary artery disease  With history of stents  Continue statin     Paroxysmal atrial fibrillation  Continue Eliquis, renal dosing  Discontinued metoprolol      Hypertension  Hypotension  Discontinued metoprolol     Hyperlipidemia  Continue statin     Hyperthyroidism  Continue methimazole 7.5 mg in the morning, 5 mg in the evening     GERD  Continue omeprazole 20 mg     Anemia  Likely of chronic renal disease  Continue Retacrit     Leukocytosis, resolved  Recently treated with IV antibiotics for pneumonia  Negative urinalysis and chest x-ray  Negative blood cultures    Elevated procalcitonin, improved  Recently treated with IV antibiotics for pneumonia    Penile discharge/balanitis  Culture with yeast, status post Diflucan    Nausea/emesis  Check KUB  Check urinalysis  Check morning labs  Discharge on hold    COVID  negative 1/11, re-screen 1/17 negative    Total time:  27 minutes.  I spent greater than 50% of the time for patient care, counseling, and coordination on this date, including patient face-to face time, unit/floor time with review of records/pertinent lab data and studies, as well as discussing diagnostic evaluation/work up, planned therapeutic interventions, and future disposition of care, as per the interval events/subjective and the assessment and plan as noted above.    I have performed a physical exam, reviewed and updated ROS, as well as the assessment and plan today 1/27/2021. In review of note from 1/26/21 there are no new changes except as documented above.        Lata Goodman M.D.   Physical Medicine and Rehabilitation

## 2021-01-27 NOTE — PROGRESS NOTES
[facility] Medicine Daily Progress Note    Date of Service  1/27/2021    Chief Complaint:  Hypertension  Afib  Diabetes  Leukocytosis  Penile wound    Interval History:  No significant events or changes since last visit    Review of Systems  Review of Systems   Constitutional: Negative for fever.   Eyes: Negative for blurred vision.   Respiratory: Negative for shortness of breath.    Cardiovascular: Negative for palpitations.   Gastrointestinal: Negative for nausea and vomiting.   Neurological: Negative for dizziness and headaches.   Psychiatric/Behavioral: Negative for hallucinations.        Physical Exam  Temp:  [36.6 °C (97.8 °F)-36.8 °C (98.3 °F)] 36.6 °C (97.9 °F)  Pulse:  [86-98] 98  Resp:  [16-18] 18  BP: (107-143)/(60-71) 131/70  SpO2:  [99 %-100 %] 99 %    Physical Exam  Vitals signs and nursing note reviewed.   Constitutional:       General: He is not in acute distress.  HENT:      Mouth/Throat:      Mouth: Mucous membranes are moist.      Pharynx: Oropharynx is clear.   Eyes:      General: No scleral icterus.  Neck:      Musculoskeletal: No neck rigidity.   Cardiovascular:      Rate and Rhythm: Normal rate and regular rhythm.      Heart sounds: No murmur.   Pulmonary:      Effort: Pulmonary effort is normal.      Breath sounds: Normal breath sounds. No stridor.   Abdominal:      General: There is no distension.      Palpations: Abdomen is soft.      Tenderness: There is no abdominal tenderness.   Musculoskeletal:      Right lower leg: No edema.      Left lower leg: No edema.      Comments: Has right BKA   Skin:     General: Skin is warm and dry.      Findings: No rash.   Neurological:      Mental Status: He is alert and oriented to person, place, and time.   Psychiatric:         Mood and Affect: Mood normal.         Behavior: Behavior normal.         Fluids    Intake/Output Summary (Last 24 hours) at 1/27/2021 0747  Last data filed at 1/26/2021 2139  Gross per 24 hour   Intake 1460 ml   Output 3500 ml   Net  -2040 ml       Laboratory  Recent Labs     01/26/21  0528   WBC 6.5   RBC 2.78*   HEMOGLOBIN 8.8*   HEMATOCRIT 27.6*   MCV 99.3*   MCH 31.7   MCHC 31.9*   RDW 55.8*   PLATELETCT 321   MPV 9.1     Recent Labs     01/26/21  0528   SODIUM 132*   POTASSIUM 4.5   CHLORIDE 95*   CO2 24   GLUCOSE 136*   BUN 44*   CREATININE 6.59*   CALCIUM 9.1                   Imaging    Assessment/Plan  * 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)- (present on admission)  Assessment & Plan  2nd to PVD & diabetes with non-healing wound and reported gangrene  S/P right BKA (12/31)    Abrasion of penis with infection- (present on admission)  Assessment & Plan  Thought to be 2nd to palacios trauma  Wound Cx: show yeast  S/P Diflucan  Repeat UA: negative  S/P Diflucan for suspected balanitis (1/27)    Paroxysmal A-fib ([facility])- (present on admission)  Assessment & Plan  HR ok  Off Lopressor (for low BP)  On Eliquis  Monitor    End stage renal disease on dialysis ([facility])- (present on admission)  Assessment & Plan  On HD (T, Th, Sat)    Type 2 diabetes mellitus with kidney complication, with long-term current use of insulin ([facility])- (present on admission)  Assessment & Plan  Hba1c: 7.8 (11/14/20) --> 5.2 (1/15)  BS ok  On Lantus: 8 units qam   Note: home meds were Lantus 5 units qhs  Cont to monitor    Anemia- (present on admission)  Assessment & Plan  Has a recent hx -- likely 2nd to kidney disease  Recent surgery likely a component  Hb stable: 8.8  Fe: 65, sats 35%  B12: 852  Folate: 8.5  FOB (+)  On Epogen  On PPI bid  Suggest GI F/U  Monitor    Essential hypertension- (present on admission)  Assessment & Plan  BP ok  Off Lopressor  Currently not on any hypertensive meds  Note: on Flomax  Cont to monitor    Hyperthyroidism- (present on admission)  Assessment & Plan  On Tapazole    Nausea & vomiting  Assessment & Plan  Had N/V early this am while waking up from sleeping  Had another episode of N/V thia am at around 11 am  Pt feels  that it may be from his dialysis -- he often gets lightheaded/woozy after dialysis  Will check U/A and AXR (1/27)  Monitor    Dyslipidemia- (present on admission)  Assessment & Plan  On Lipitor

## 2021-01-27 NOTE — PROGRESS NOTES
"[facility] Nephrology Progress Note    Author: Kamryn ECKERT  Collaborating Physician: Kulwant Hodges MD    Date of Service  1/27/2021    Chief Complaint  A 75-year-old male with end-stage renal disease   on chronic hemodialysis Tuesdays, Thursdays and Saturdays.  The patient was   admitted on 12/31 for right below-the-knee amputation.  He had wound and   gangrene of the right foot.  He has had some peripheral vascular procedures   previously.  He did have a right ray amputation in 11/2020.  He has had a   nonhealing wound and he was admitted for right below-the-knee amputation that   was performed on 12/31.  We have been consulted to assist in his dialysis   needs.  He had dialysis on 12/31 prior to admission.  Today, he is feeling   dyspneic.  He has no cough or sputum production.    Daily Nephrology Summary   1/13- Transferred to Rehab  1/14 - Pt sitting up in W/C in room, denies any complaints/concerns, due for HD today, BP labile, K+ 3.1, per chart review-penile discharge cx sent  1/15 - Pt getting up to restroom to shower with assistance, HD yesterday with 2.1L UF, K+ corrected to 3.6, iron stores replete, no growth on penile wound cx, VSS on RA   1/16 - No overnight events, HD later today.  No labs for review.  Sitting up at bedside eating lunch.  No complaints.   1/18 - No new events. Pt with palacios. C/O GI issues, bowels are \"loose\". No new events.   1/19 - HD today. VSS. No new events.Pt seen during therapy. C/O about palacios catheter.  1/20 - HD yest, UF 1.7L. Occult blood stool +. CXR improved. Abd xray no obstruction. Abd pain improved after 2 large BMs. C/O palacios catheter.  1/21 - Voiding trial on Mon. Due for HD today. Sleepy today 2/2 sleeping pill last hs, slept very well.  1/21 - Pt no longer with abd pain. C/O \"sore bottom\" and palacios catheter discomfort.  1/22 - Due to HD this am. Pt feels pretty good. Palacios removed. Pt denies pain but more discomfort once he gets up moving around. No abd " discomfort this am.  01/25- Up in wheelchair, feeling well.  No pain, n/v, sob.  Reports he was able to urinate this am.  No overnight events.   01/26 - Laying in bed resting comfortably.  HD later today VSS, UOP ~ 375.  No overnight events.  01/27 - Reports episodes of vomiting last night, he does not believe it had to due with HD.  States he has thick mucous in throat.  HD with 3L uf yesterday.   [facility] to [facility].     Review of Systems  Review of Systems   Constitutional: Positive for malaise/fatigue. Negative for chills and fever.   HENT: Negative.    Eyes: Negative.    Respiratory: Negative.    Cardiovascular: Negative.    Gastrointestinal: Negative.  Negative for abdominal pain, nausea and vomiting.   Genitourinary:        Tomas catheter   Musculoskeletal: Positive for joint pain. Negative for back pain and myalgias.        C/O RLE phantom pain   Skin: Negative.    Neurological: Negative.    Endo/Heme/Allergies: Negative.    Psychiatric/Behavioral: Negative.       Physical Exam  Temp:  [36.6 °C (97.8 °F)-36.8 °C (98.3 °F)] 36.6 °C (97.9 °F)  Pulse:  [86-98] 98  Resp:  [16-18] 18  BP: (107-143)/(60-71) 131/70  SpO2:  [99 %-100 %] 99 %    Physical Exam  Constitutional:       Appearance: Normal appearance.   HENT:      Head: Normocephalic and atraumatic.      Nose: Nose normal.      Mouth/Throat:      Mouth: Mucous membranes are moist.      Pharynx: Oropharynx is clear.   Eyes:      Extraocular Movements: Extraocular movements intact.      Pupils: Pupils are equal, round, and reactive to light.   Neck:      Musculoskeletal: Normal range of motion and neck supple.   Cardiovascular:      Rate and Rhythm: Normal rate and regular rhythm.      Pulses: Normal pulses.      Comments: L AVF +T/B  Pulmonary:      Effort: Pulmonary effort is normal.      Breath sounds: Normal breath sounds.   Abdominal:      General: Bowel sounds are normal.      Palpations: Abdomen is soft.   Musculoskeletal:         General:  No deformity.      Comments: R BKA    Skin:     General: Skin is warm and dry.   Neurological:      General: No focal deficit present.      Mental Status: He is alert and oriented to person, place, and time.   Psychiatric:         Mood and Affect: Mood normal.         Behavior: Behavior normal.         Fluids    Intake/Output Summary (Last 24 hours) at 1/27/2021 0810  Last data filed at 1/26/2021 2139  Gross per 24 hour   Intake 1460 ml   Output 3500 ml   Net -2040 ml       Laboratory  Recent Labs     01/26/21  0528   WBC 6.5   RBC 2.78*   HEMOGLOBIN 8.8*   HEMATOCRIT 27.6*   MCV 99.3*   MCH 31.7   MCHC 31.9*   RDW 55.8*   PLATELETCT 321   MPV 9.1     Recent Labs     01/26/21  0528   SODIUM 132*   POTASSIUM 4.5   CHLORIDE 95*   CO2 24   GLUCOSE 136*   BUN 44*   CREATININE 6.59*   CALCIUM 9.1         No results for input(s): NTPROBNP in the last 72 hours.        Imaging  Available labs, imaging and clinical documentation reviewed.     Assessment/Plan  #  End-stage renal disease, on chronic hemodialysis qTTS  - Normally dialyzes at [facility] via L AVF   #  Status post right BKA on 12/31/2020 for diabetic ulcer.  #  Hypertension  - Currently on no BP medications, BP less than 140/90   #  Anemia: superimposed blood loss in addition to chronic kidney disease.  - Hgb below target   - On ARMANDO   - Iron replete, ferritin elevated   #  Diabetes mellitus.  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  - On calcitriol and calcium acetate   - Calcium acetate currently being held   - Vit D 48   - Phos 4.1  - iPTH 109   #  Peripheral vascular disease.   #  Paroxysmal atrial fibrillation, on Eliquis and metoprolol.   #  Coronary artery disease, status post stents.  Asymptomatic.  #  History of hyperthyroidism. On methimazole.   #  Dyslipidemia, on statin therapy.   #  Past history of heavy tobacco use.  #  Pneumonia: s/p IV Zosyn   #  Encephalopathy: resolving  #  Weakness/debility  #  Urinary retention requiring catheter  placement  - Chronic oliguria  - Tomas cath removed   - Voiding trail in effect   #  Hypokalemia, corrected  #  Hyponatremia in ESRD, mild, improved  #  Penile meatus wound  - NGTD on cx      PLAN:  -No Hemodialysis today (Wed)   -Continue hemodialysis qTTS/PRN  -UF as tolerated  -ARMANDO TIW with HD, goal Hgb 10-11   -Transfuse PRN Hgb <7  -Goal BP <140/90  -No dietary protein restrictions  -Dose meds for ESRD  -Limit narcotics/sedating meds  -PT/OT/Speech  - Labs Thursday   -Ok to transition to outpatient hemodialysis from Nephrology standpoint when medically cleared.     Thank you,

## 2021-01-28 NOTE — THERAPY
Physical Therapy   Daily Treatment     Patient Name: Luis Sepulveda  Age:  75 y.o., Sex:  male  Medical Record #: [id number]  Today's Date: 1/28/2021     Precautions  Precautions: Fall Risk, Non Weight Bearing Right Lower Extremity  Comments: R BKA, R IPOP, dialysis Tues, Thurs, Sat, L arm fistula, skin breakdown L heel    Subjective    Pt reports he is agreeable to exercises before lunch      Objective       01/28/21 1116   Sitting Lower Body Exercises   Long Arc Quad 2 sets of 15;Bilateral   Marching 2 sets of 15;Reciprocal   Hamstring Curl 2 sets of 15;Bilateral   Comments Also 3 x 30 sec HS and KTC stretches. VCs and demof or all eercises   Interdisciplinary Plan of Care Collaboration   Patient Position at End of Therapy Seated;Call Light within Reach;Tray Table within Reach;Self Releasing Lap Belt Applied;Chair Alarm On   PT Total Time Spent   PT Individual Total Time Spent (Mins) 30   PT Charge Group   PT Therapeutic Exercise 2       Assessment    Pt performs exercises well and cont to demo steady improvement towards independence.     Strengths: Able to follow instructions, Pleasant and cooperative, Motivated for self care and independence, Independent prior level of function, Willingly participates in therapeutic activities  Barriers: Generalized weakness, Impaired balance, Limited mobility, Impaired insight/denial of deficits, Poor family support    Plan    DC? Exercise BLE - need to build safer strength on LLE, progress BKA edu, STS FWW, transfer safety, ambulation with close wc follow and FWW, SPT training with FWW, wc mob tight spaces       Physical Therapy Problems     Problem: Mobility Transfers     Dates: Start: 01/25/21       Goal: STG-Within one week, patient will transfer bed to chair     Dates: Start: 01/25/21       Description: 1) Individualized goal:  At SPV with LRD In order to maximize self mobility  2) Interventions:  PT Gait Training, PT Therapeutic Exercises, PT Neuro Re-Ed/Balance, PT  Therapeutic Activity, PT Manual Therapy, and PT Evaluation    Note:     Goal Note filed on 01/28/21 0931 by Christian Mcadams, PT    CGA                        Problem: PT-Long Term Goals     Dates: Start: 01/14/21       Goal: LTG-By discharge, patient will propel wheelchair     Dates: Start: 01/14/21       Description: 1) Individualized goal:  150 ft At mod I indoor and outdoor in order to improve activity tolerance propelling himself around facility  2) Interventions:  PT Gait Training, PT Therapeutic Exercises, PT Neuro Re-Ed/Balance, PT Therapeutic Activity, PT Manual Therapy, and PT Evaluation    Note:     Goal Note filed on 01/28/21 0931 by Christian Mcadams, PT    Indoor only                  Goal: LTG-By discharge, patient will transfer one surface to another     Dates: Start: 01/14/21       Description: 1) Individualized goal:  At mod I with LRD In order to maximize self mobility  2) Interventions:  PT Gait Training, PT Therapeutic Exercises, PT Neuro Re-Ed/Balance, PT Therapeutic Activity, PT Manual Therapy, and PT Evaluation    Note:     Goal Note filed on 01/28/21 0931 by Christian Mcadams, PT    CGA                  Goal: LTG-By discharge, patient will transfer in/out of a car     Dates: Start: 01/14/21       Description: 1) Individualized goal:  At SPV with LRD In order to maximize self mobility  2) Interventions:  PT Gait Training, PT Therapeutic Exercises, PT Neuro Re-Ed/Balance, PT Therapeutic Activity, PT Manual Therapy, and PT Evaluation    Note:     Goal Note filed on 01/28/21 0931 by Christian Mcadams, PT    CGA

## 2021-01-28 NOTE — PROGRESS NOTES
"Rehab Progress Note     Date of Service: 1/28/2021  Chief Complaint: Follow-up BKA    Interval Events (Subjective)    Patient seen and examined today in his room. He feels much better today, without any more nausea/emesis, and was able to eat breakfast. He has dialysis this afternoon. Unfortunately he lost his bed at the SNF, and there won't be another one open until Monday. Patient denies any pain currently. Therapy continues to go well, though patient reports his left knee intermittently gives out, which is why he's not yet ready to be discharged home without supervision.     Objective:  VITAL SIGNS: /58   Pulse 63   Temp 36.7 °C (98 °F) (Oral)   Resp 18   Ht 1.651 m (5' 5\")   Wt 79.5 kg (175 lb 4.3 oz)   SpO2 96%   BMI 29.17 kg/m²   Gen: alert, no apparent distress  Msk: right transtibial amputation    Recent Results (from the past 72 hour(s))   ACCU-CHEK GLUCOSE    Collection Time: 01/25/21  5:05 PM   Result Value Ref Range    Glucose - Accu-Ck 160 (H) 65 - 99 mg/dL   ACCU-CHEK GLUCOSE    Collection Time: 01/25/21  9:50 PM   Result Value Ref Range    Glucose - Accu-Ck 143 (H) 65 - 99 mg/dL   Comp Metabolic Panel    Collection Time: 01/26/21  5:28 AM   Result Value Ref Range    Sodium 132 (L) 135 - 145 mmol/L    Potassium 4.5 3.6 - 5.5 mmol/L    Chloride 95 (L) 96 - 112 mmol/L    Co2 24 20 - 33 mmol/L    Anion Gap 13.0 7.0 - 16.0    Glucose 136 (H) 65 - 99 mg/dL    Bun 44 (H) 8 - 22 mg/dL    Creatinine 6.59 (HH) 0.50 - 1.40 mg/dL    Calcium 9.1 8.5 - 10.5 mg/dL    AST(SGOT) 9 (L) 12 - 45 U/L    ALT(SGPT) 11 2 - 50 U/L    Alkaline Phosphatase 117 (H) 30 - 99 U/L    Total Bilirubin 0.3 0.1 - 1.5 mg/dL    Albumin 3.3 3.2 - 4.9 g/dL    Total Protein 6.2 6.0 - 8.2 g/dL    Globulin 2.9 1.9 - 3.5 g/dL    A-G Ratio 1.1 g/dL   CBC WITH DIFFERENTIAL    Collection Time: 01/26/21  5:28 AM   Result Value Ref Range    WBC 6.5 4.8 - 10.8 K/uL    RBC 2.78 (L) 4.70 - 6.10 M/uL    Hemoglobin 8.8 (L) 14.0 - 18.0 g/dL "    Hematocrit 27.6 (L) 42.0 - 52.0 %    MCV 99.3 (H) 81.4 - 97.8 fL    MCH 31.7 27.0 - 33.0 pg    MCHC 31.9 (L) 33.7 - 35.3 g/dL    RDW 55.8 (H) 35.9 - 50.0 fL    Platelet Count 321 164 - 446 K/uL    MPV 9.1 9.0 - 12.9 fL    Neutrophils-Polys 74.70 (H) 44.00 - 72.00 %    Lymphocytes 14.70 (L) 22.00 - 41.00 %    Monocytes 8.90 0.00 - 13.40 %    Eosinophils 0.30 0.00 - 6.90 %    Basophils 0.50 0.00 - 1.80 %    Immature Granulocytes 0.90 0.00 - 0.90 %    Nucleated RBC 0.00 /100 WBC    Neutrophils (Absolute) 4.86 1.82 - 7.42 K/uL    Lymphs (Absolute) 0.96 (L) 1.00 - 4.80 K/uL    Monos (Absolute) 0.58 0.00 - 0.85 K/uL    Eos (Absolute) 0.02 0.00 - 0.51 K/uL    Baso (Absolute) 0.03 0.00 - 0.12 K/uL    Immature Granulocytes (abs) 0.06 0.00 - 0.11 K/uL    NRBC (Absolute) 0.00 K/uL   ESTIMATED GFR    Collection Time: 01/26/21  5:28 AM   Result Value Ref Range    GFR If African American 10 (A) >60 mL/min/1.73 m 2    GFR If Non  8 (A) >60 mL/min/1.73 m 2   ACCU-CHEK GLUCOSE    Collection Time: 01/26/21  7:55 AM   Result Value Ref Range    Glucose - Accu-Ck 128 (H) 65 - 99 mg/dL   ACCU-CHEK GLUCOSE    Collection Time: 01/26/21 11:26 AM   Result Value Ref Range    Glucose - Accu-Ck 128 (H) 65 - 99 mg/dL   ACCU-CHEK GLUCOSE    Collection Time: 01/26/21  5:34 PM   Result Value Ref Range    Glucose - Accu-Ck 119 (H) 65 - 99 mg/dL   ACCU-CHEK GLUCOSE    Collection Time: 01/26/21  9:33 PM   Result Value Ref Range    Glucose - Accu-Ck 149 (H) 65 - 99 mg/dL   ACCU-CHEK GLUCOSE    Collection Time: 01/27/21  8:07 AM   Result Value Ref Range    Glucose - Accu-Ck 119 (H) 65 - 99 mg/dL   ACCU-CHEK GLUCOSE    Collection Time: 01/27/21 11:08 AM   Result Value Ref Range    Glucose - Accu-Ck 131 (H) 65 - 99 mg/dL   ACCU-CHEK GLUCOSE    Collection Time: 01/27/21  5:12 PM   Result Value Ref Range    Glucose - Accu-Ck 109 (H) 65 - 99 mg/dL   ACCU-CHEK GLUCOSE    Collection Time: 01/27/21  9:37 PM   Result Value Ref Range    Glucose  - Accu-Ck 106 (H) 65 - 99 mg/dL   Comp Metabolic Panel    Collection Time: 01/28/21  5:31 AM   Result Value Ref Range    Sodium 139 135 - 145 mmol/L    Potassium 4.4 3.6 - 5.5 mmol/L    Chloride 98 96 - 112 mmol/L    Co2 26 20 - 33 mmol/L    Anion Gap 15.0 7.0 - 16.0    Glucose 106 (H) 65 - 99 mg/dL    Bun 30 (H) 8 - 22 mg/dL    Creatinine 5.61 (HH) 0.50 - 1.40 mg/dL    Calcium 9.0 8.5 - 10.5 mg/dL    AST(SGOT) 12 12 - 45 U/L    ALT(SGPT) 9 2 - 50 U/L    Alkaline Phosphatase 115 (H) 30 - 99 U/L    Total Bilirubin 0.4 0.1 - 1.5 mg/dL    Albumin 3.4 3.2 - 4.9 g/dL    Total Protein 6.1 6.0 - 8.2 g/dL    Globulin 2.7 1.9 - 3.5 g/dL    A-G Ratio 1.3 g/dL   CBC WITH DIFFERENTIAL    Collection Time: 01/28/21  5:31 AM   Result Value Ref Range    WBC 6.4 4.8 - 10.8 K/uL    RBC 2.79 (L) 4.70 - 6.10 M/uL    Hemoglobin 8.6 (L) 14.0 - 18.0 g/dL    Hematocrit 27.9 (L) 42.0 - 52.0 %    .0 (H) 81.4 - 97.8 fL    MCH 30.8 27.0 - 33.0 pg    MCHC 30.8 (L) 33.7 - 35.3 g/dL    RDW 58.3 (H) 35.9 - 50.0 fL    Platelet Count 301 164 - 446 K/uL    MPV 9.3 9.0 - 12.9 fL    Neutrophils-Polys 71.50 44.00 - 72.00 %    Lymphocytes 17.30 (L) 22.00 - 41.00 %    Monocytes 9.80 0.00 - 13.40 %    Eosinophils 0.00 0.00 - 6.90 %    Basophils 0.60 0.00 - 1.80 %    Immature Granulocytes 0.80 0.00 - 0.90 %    Nucleated RBC 0.00 /100 WBC    Neutrophils (Absolute) 4.57 1.82 - 7.42 K/uL    Lymphs (Absolute) 1.11 1.00 - 4.80 K/uL    Monos (Absolute) 0.63 0.00 - 0.85 K/uL    Eos (Absolute) 0.00 0.00 - 0.51 K/uL    Baso (Absolute) 0.04 0.00 - 0.12 K/uL    Immature Granulocytes (abs) 0.05 0.00 - 0.11 K/uL    NRBC (Absolute) 0.00 K/uL   PHOSPHORUS    Collection Time: 01/28/21  5:31 AM   Result Value Ref Range    Phosphorus 4.5 2.5 - 4.5 mg/dL   ESTIMATED GFR    Collection Time: 01/28/21  5:31 AM   Result Value Ref Range    GFR If  12 (A) >60 mL/min/1.73 m 2    GFR If Non African American 10 (A) >60 mL/min/1.73 m 2   ACCU-CHEK GLUCOSE     Collection Time: 01/28/21  7:13 AM   Result Value Ref Range    Glucose - Accu-Ck 108 (H) 65 - 99 mg/dL   ACCU-CHEK GLUCOSE    Collection Time: 01/28/21 11:06 AM   Result Value Ref Range    Glucose - Accu-Ck 135 (H) 65 - 99 mg/dL       Current Facility-Administered Medications   Medication Frequency   • senna-docusate (PERICOLACE or SENOKOT S) 8.6-50 MG per tablet 2 Tab BID PRN    And   • magnesium hydroxide (MILK OF MAGNESIA) suspension 30 mL QDAY PRN    And   • bisacodyl (DULCOLAX) suppository 10 mg QDAY PRN   • miconazole 2%-zinc oxide (Dominga) topical cream BID   • omeprazole (PRILOSEC) capsule 20 mg BID   • insulin regular (HumuLIN R,NovoLIN R) injection 4X/DAY ACHS    And   • dextrose 50% (D50W) injection 50 mL Q15 MIN PRN   • insulin glargine (Lantus) injection QAM INSULIN   • tamsulosin (FLOMAX) capsule 0.8 mg QHS   • gabapentin (NEURONTIN) capsule 300 mg DAILY   • heparin injection 1,800 Units DIALYSIS PRN   • hydrOXYzine HCl (ATARAX) tablet 50 mg Q6HRS PRN   • melatonin tablet 3 mg HS PRN   • Respiratory Therapy Consult Continuous RT   • Pharmacy Consult Request ...Pain Management Review 1 Each PHARMACY TO DOSE   • acetaminophen (Tylenol) tablet 650 mg Q4HRS PRN   • lactulose 20 GM/30ML solution 30 mL QDAY PRN   • docusate sodium (ENEMEEZ) enema 283 mg QDAY PRN   • fleet enema 133 mL QDAY PRN   • artificial tears ophthalmic solution 1 Drop PRN   • benzocaine-menthol (CEPACOL) lozenge 1 Lozenge Q2HRS PRN   • mag hydrox-al hydrox-simeth (MAALOX PLUS ES or MYLANTA DS) suspension 20 mL Q2HRS PRN   • ondansetron (ZOFRAN ODT) dispertab 4 mg 4X/DAY PRN    Or   • ondansetron (ZOFRAN) syringe/vial injection 4 mg 4X/DAY PRN   • traZODone (DESYREL) tablet 50 mg QHS PRN   • sodium chloride (OCEAN) 0.65 % nasal spray 2 Spray PRN   • traMADol (ULTRAM) 50 MG tablet 50 mg Q4HRS PRN   • atorvastatin (LIPITOR) tablet 40 mg Q EVENING   • calcitRIOL (ROCALTROL) capsule 0.25 mcg DAILY   • epoetin (Retacrit) injection (Dialysis  use only) 10,000 Units TUE+THU+SAT   • methimazole (TAPAZOLE) tablet 5 mg Q EVENING   • methimazole (TAPAZOLE) tablet 7.5 mg QAM   • oxyCODONE immediate-release (ROXICODONE) tablet 5 mg Q4HRS PRN   • apixaban (ELIQUIS) tablet 2.5 mg BID       Orders Placed This Encounter   Procedures   • Diet Order Diet: Renal; Second Modifier: (optional): Consistent CHO (Diabetic)     Standing Status:   Standing     Number of Occurrences:   1     Order Specific Question:   Diet:     Answer:   Renal [8]     Order Specific Question:   Second Modifier: (optional)     Answer:   Consistent CHO (Diabetic) [4]       Assessment:  Active Hospital Problems    Diagnosis   • *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)   • Leukocytosis   • Urinary retention   • Paroxysmal A-fib (HCC)   • End stage renal disease on dialysis (HCC)   • Type 2 diabetes mellitus with kidney complication, with long-term current use of insulin (HCC)   • Essential hypertension   • Anemia   • Hyperthyroidism   • Dyslipidemia   • Hypokalemia   • Impaired mobility and ADLs     This patient is a 75 y.o. male admitted for acute inpatient rehabilitation with 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).    I led and attended the weekly conference, and agree with the IDT conference documentation and plan of care as noted below.    Date of conference: 1/25/2021    Goals and barriers: See IDT note.    Biggest barriers: doesn't have 24/7 at home as family works    Goals in next week: discharge to skilled, right BKA, left knee ana, poor safety awareness    CM/social support: ex-wife and son work during the day    Anticipated DC date: 2/1, [facility]    Follow up: PCP, surgery, Dr. Elliott, urology, nephrology    Medical Decision Making and Plan:    Right BKA  12/31 Dr. Lynn  Continue full rehab program  PT/OT/SLP, 1 hr each discipline, 5 days per week     Outpatient follow-up for suture removal after discharge with surgery    Outpatient  follow-up with Dr. Elliott, referral made    Phantom pain, resolved  Continue gabapentin 300 mg  Outpatient follow-up with Dr. Elliott, referral made     Encephalopathy, resolved  Speech therapy for cognitive assessment  Currently only with mild memory deficits    ?Depression, not found  Psychology consult, Dr Izaguirre, appreciate assistance     Urinary retention, improved  Had very high residuals  Continue intermittent caths for over 400 cc - last required 1/24  Started Flomax 0.4 mg 1/14 --> 0.8 mg 1/16  PRN lidocaine jelly for caths  Placed indwelling catheter for bladder rest 1/17, removed 1/23, voiding improved, though still is retaining some - PVRs 216  Referral to urology made    Bowel program  History of diabetic gastroparesis  Continue bowel medications  Scheduled Sennakot  PRN Miralax, MOM, bisacodyl suppository  Last BM 1/27    DVT prophylaxis  Eliquis     Appreciate the assistance of the hospitalist with his medical comorbidities:     End-stage renal disease on hemodialysis  Consulted nephrology  Continue calcitriol 0.25 mcg daily  Continue Retacrit with dialysis  Continue K-Phos 250 mg 2 times daily     Diabetes with hyperglycemia  Sliding scale insulin  Lantus 5 units daily --> 8 units 1/19     Peripheral artery disease  Continue Eliquis, renal dosing  Continue statin     Coronary artery disease  With history of stents  Continue statin     Paroxysmal atrial fibrillation  Continue Eliquis, renal dosing  Discontinued metoprolol      Hypertension  Hypotension  Discontinued metoprolol     Hyperlipidemia  Continue statin     Hyperthyroidism  Continue methimazole 7.5 mg in the morning, 5 mg in the evening     GERD  Continue omeprazole 20 mg     Anemia  Likely of chronic renal disease  Continue Retacrit     Leukocytosis, resolved  Recently treated with IV antibiotics for pneumonia  Negative urinalysis and chest x-ray  Negative blood cultures    Elevated procalcitonin, improved  Recently treated with IV antibiotics  for pneumonia    Penile discharge/balanitis  Culture with yeast, status post Diflucan    Nausea/emesis, resolved  Negative KUB  Check urinalysis, pending    COVID negative 1/11, re-screen 1/17 negative    Total time:  15 minutes.  I spent greater than 50% of the time for patient care, counseling, and coordination on this date, including patient face-to face time, unit/floor time with review of records/pertinent lab data and studies, as well as discussing diagnostic evaluation/work up, planned therapeutic interventions, and future disposition of care, as per the interval events/subjective and the assessment and plan as noted above.    I have performed a physical exam, reviewed and updated ROS, as well as the assessment and plan today 1/28/2021. In review of note from 1/27/21 there are no new changes except as documented above.          Lata Goodman M.D.   Physical Medicine and Rehabilitation

## 2021-01-28 NOTE — PROGRESS NOTES
"[facility] Nephrology Progress Note    Author: Sharon ECKERT  Collaborating Physician: Kulwant Hodges MD    Date of Service  1/28/2021    Chief Complaint  A 75-year-old male with end-stage renal disease   on chronic hemodialysis Tuesdays, Thursdays and Saturdays.  The patient was   admitted on 12/31 for right below-the-knee amputation.  He had wound and   gangrene of the right foot.  He has had some peripheral vascular procedures   previously.  He did have a right ray amputation in 11/2020.  He has had a   nonhealing wound and he was admitted for right below-the-knee amputation that   was performed on 12/31.  We have been consulted to assist in his dialysis   needs.  He had dialysis on 12/31 prior to admission.  Today, he is feeling   dyspneic.  He has no cough or sputum production.    Daily Nephrology Summary   1/13- Transferred to Rehab  1/14 - Pt sitting up in W/C in room, denies any complaints/concerns, due for HD today, BP labile, K+ 3.1, per chart review-penile discharge cx sent  1/15 - Pt getting up to restroom to shower with assistance, HD yesterday with 2.1L UF, K+ corrected to 3.6, iron stores replete, no growth on penile wound cx, VSS on RA   1/16 - No overnight events, HD later today.  No labs for review.  Sitting up at bedside eating lunch.  No complaints.   1/18 - No new events. Pt with palacios. C/O GI issues, bowels are \"loose\". No new events.   1/19 - HD today. VSS. No new events.Pt seen during therapy. C/O about palacios catheter.  1/20 - HD yest, UF 1.7L. Occult blood stool +. CXR improved. Abd xray no obstruction. Abd pain improved after 2 large BMs. C/O palacios catheter.  1/21 - Voiding trial on Mon. Due for HD today. Sleepy today 2/2 sleeping pill last hs, slept very well.  1/21 - Pt no longer with abd pain. C/O \"sore bottom\" and palacios catheter discomfort.  1/22 - Due to HD this am. Pt feels pretty good. Palacios removed. Pt denies pain but more discomfort once he gets up moving around. No abd " discomfort this am.  01/25- Up in wheelchair, feeling well.  No pain, n/v, sob.  Reports he was able to urinate this am.  No overnight events.   01/26 - Laying in bed resting comfortably.  HD later today VSS, UOP ~ 375.  No overnight events.  01/27 - Reports episodes of vomiting last night, he does not believe it had to due with HD.  States he has thick mucous in throat.  HD with 3L uf yesterday.   [facility].   01/28 - Pt resting in bed, says he feels much improved this AM, denies N/V, labs and imaging reviewed, VSS, due for HD today     Review of Systems  Review of Systems   Constitutional: Positive for malaise/fatigue. Negative for chills and fever.   HENT: Negative.    Eyes: Negative.    Respiratory: Negative.    Cardiovascular: Negative.    Gastrointestinal: Negative.  Negative for abdominal pain, nausea and vomiting.   Musculoskeletal: Negative for joint pain and myalgias.        C/O RLE phantom pain   Skin: Negative.    Neurological: Negative.    Endo/Heme/Allergies: Negative.    Psychiatric/Behavioral: Negative.       Physical Exam  Temp:  [36.5 °C (97.7 °F)-36.8 °C (98.2 °F)] 36.8 °C (98.2 °F)  Pulse:  [83-92] 83  Resp:  [16-18] 18  BP: (102-140)/(62-65) 108/62  SpO2:  [93 %-95 %] 95 %    Physical Exam  Constitutional:       Appearance: Normal appearance.   HENT:      Head: Normocephalic and atraumatic.      Nose: Nose normal.      Mouth/Throat:      Mouth: Mucous membranes are moist.      Pharynx: Oropharynx is clear.   Eyes:      Extraocular Movements: Extraocular movements intact.      Pupils: Pupils are equal, round, and reactive to light.   Neck:      Musculoskeletal: Normal range of motion and neck supple.   Cardiovascular:      Rate and Rhythm: Normal rate and regular rhythm.      Pulses: Normal pulses.      Comments: L AVF +T/B  Pulmonary:      Effort: Pulmonary effort is normal.      Breath sounds: Normal breath sounds.   Abdominal:      General: Bowel sounds are normal.       Palpations: Abdomen is soft.   Musculoskeletal:         General: No deformity.      Comments: R BKA    Skin:     General: Skin is warm and dry.   Neurological:      General: No focal deficit present.      Mental Status: He is alert and oriented to person, place, and time.   Psychiatric:         Mood and Affect: Mood normal.         Behavior: Behavior normal.         Fluids    Intake/Output Summary (Last 24 hours) at 1/28/2021 0741  Last data filed at 1/27/2021 1850  Gross per 24 hour   Intake 598 ml   Output --   Net 598 ml       Laboratory  Recent Labs     01/26/21  0528 01/28/21  0531   WBC 6.5 6.4   RBC 2.78* 2.79*   HEMOGLOBIN 8.8* 8.6*   HEMATOCRIT 27.6* 27.9*   MCV 99.3* 100.0*   MCH 31.7 30.8   MCHC 31.9* 30.8*   RDW 55.8* 58.3*   PLATELETCT 321 301   MPV 9.1 9.3     Recent Labs     01/26/21 0528 01/28/21  0531   SODIUM 132* 139   POTASSIUM 4.5 4.4   CHLORIDE 95* 98   CO2 24 26   GLUCOSE 136* 106*   BUN 44* 30*   CREATININE 6.59* 5.61*   CALCIUM 9.1 9.0         No results for input(s): NTPROBNP in the last 72 hours.        Imaging  Available labs, imaging and clinical documentation reviewed.     Assessment/Plan  #  End-stage renal disease, on chronic hemodialysis qTTS  - Normally dialyzes at [facility] via L AVF   #  Status post right BKA on 12/31/2020 for diabetic ulcer.  #  Hypertension, controlled  - Currently on no BP medications, BP less than 140/90   #  Anemia: superimposed blood loss in addition to chronic kidney disease.  - Hgb below target   - On ARMANDO   - Iron replete, ferritin elevated   #  Diabetes mellitus.  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  - On calcitriol and calcium acetate   - Calcium acetate currently on hold   - Vit D 48   - Phos 4.5  - iPTH 109   #  Peripheral vascular disease.   #  Paroxysmal atrial fibrillation, on Eliquis and metoprolol.   #  Coronary artery disease, status post stents.  Asymptomatic.  #  History of hyperthyroidism. On methimazole.   #  Dyslipidemia, on statin  therapy.   #  Past history of heavy tobacco use.  #  Pneumonia: s/p IV Zosyn   #  Encephalopathy: resolved  #  Weakness/debility  #  Urinary retention requiring catheter placement  - Chronic oliguria  - Tomas cath removed and pt voiding without issue   - On Flomax   - Referred to Urology   #  Hypokalemia, corrected  #  Hyponatremia in ESRD, mild, improved  #  Penile meatus wound  - NGTD on cx      PLAN:  -Continue hemodialysis qTTS/PRN, next treatment due today (THURS)  -UF as tolerated  -ARMANDO TIW with HD, goal Hgb 10-11   -Transfuse PRN Hgb <7  -Goal BP <140/90  -No dietary protein restrictions  -Dose meds for ESRD  -Limit narcotics/sedating meds  -PT/OT/Speech  -Ok to transition to outpatient hemodialysis from Nephrology standpoint when medically cleared.     Thank you,

## 2021-01-28 NOTE — PROGRESS NOTES
[facility] Medicine Daily Progress Note    Date of Service  1/28/2021    Chief Complaint:  Hypertension  Afib  Diabetes  Leukocytosis  Penile wound    Interval History:  No significant events or changes since last visit    Review of Systems  Review of Systems   Constitutional: Negative for fever.   Eyes: Negative for blurred vision.   Respiratory: Negative for cough.    Cardiovascular: Negative for chest pain.   Gastrointestinal: Negative for diarrhea.   Musculoskeletal: Negative for joint pain.   Neurological: Negative for dizziness.   Psychiatric/Behavioral: The patient is not nervous/anxious.         Physical Exam  Temp:  [36.5 °C (97.7 °F)-36.8 °C (98.2 °F)] 36.8 °C (98.2 °F)  Pulse:  [83-92] 83  Resp:  [16-18] 18  BP: (102-140)/(62-65) 108/62  SpO2:  [93 %-95 %] 95 %    Physical Exam  Vitals signs and nursing note reviewed.   Constitutional:       Appearance: He is not diaphoretic.   HENT:      Mouth/Throat:      Pharynx: No oropharyngeal exudate or posterior oropharyngeal erythema.   Eyes:      Extraocular Movements: Extraocular movements intact.   Neck:      Vascular: No carotid bruit.   Cardiovascular:      Rate and Rhythm: Normal rate and regular rhythm.      Heart sounds: No murmur.   Pulmonary:      Effort: Pulmonary effort is normal.      Breath sounds: Normal breath sounds. No stridor.   Abdominal:      General: Bowel sounds are normal.      Palpations: Abdomen is soft.   Musculoskeletal:      Right lower leg: No edema.      Left lower leg: No edema.      Comments: Has right BKA   Skin:     General: Skin is warm and dry.      Findings: No rash.   Neurological:      Mental Status: He is alert and oriented to person, place, and time.   Psychiatric:         Mood and Affect: Mood normal.         Behavior: Behavior normal.         Fluids    Intake/Output Summary (Last 24 hours) at 1/28/2021 0803  Last data filed at 1/27/2021 1850  Gross per 24 hour   Intake 598 ml   Output --   Net 598 ml        Laboratory  Recent Labs     01/26/21  0528 01/28/21  0531   WBC 6.5 6.4   RBC 2.78* 2.79*   HEMOGLOBIN 8.8* 8.6*   HEMATOCRIT 27.6* 27.9*   MCV 99.3* 100.0*   MCH 31.7 30.8   MCHC 31.9* 30.8*   RDW 55.8* 58.3*   PLATELETCT 321 301   MPV 9.1 9.3     Recent Labs     01/26/21  0528 01/28/21  0531   SODIUM 132* 139   POTASSIUM 4.5 4.4   CHLORIDE 95* 98   CO2 24 26   GLUCOSE 136* 106*   BUN 44* 30*   CREATININE 6.59* 5.61*   CALCIUM 9.1 9.0                   Imaging    Assessment/Plan  * 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)- (present on admission)  Assessment & Plan  2nd to PVD & diabetes with non-healing wound and reported gangrene  S/P right BKA (12/31)    Abrasion of penis with infection- (present on admission)  Assessment & Plan  Thought to be 2nd to palacios trauma  Wound Cx: show yeast  S/P Diflucan  Repeat UA: negative  S/P Diflucan for suspected balanitis (1/27)    Paroxysmal A-fib ([facility])- (present on admission)  Assessment & Plan  HR ok  Off Lopressor (for low BP)  On Eliquis  Monitor    End stage renal disease on dialysis ([facility])- (present on admission)  Assessment & Plan  On HD (T, Th, Sat)    Type 2 diabetes mellitus with kidney complication, with long-term current use of insulin ([facility])- (present on admission)  Assessment & Plan  Hba1c: 7.8 (11/14/20) --> 5.2 (1/15)  BS good  On Lantus: 8 units qam   Note: home meds were Lantus 5 units qhs  Cont to monitor    Anemia- (present on admission)  Assessment & Plan  Has a recent hx -- likely 2nd to kidney disease  Recent surgery likely a component  Hb stable: 8.8  Fe: 65, sats 35%  B12: 852  Folate: 8.5  FOB (+)  On Epogen  On PPI bid  Suggest GI F/U  Monitor    Essential hypertension- (present on admission)  Assessment & Plan  BP ok  Off Lopressor  Currently not on any hypertensive meds  Note: on Flomax  Cont to monitor    Hyperthyroidism- (present on admission)  Assessment & Plan  On Tapazole    Nausea & vomiting  Assessment &  Plan  (1/27): Had 2 episodes of N/V   (1/28): no N/V since 1/27  Has been afebrile  No leukocytosis  Pt feels that it may be from his dialysis -- he often gets lightheaded/woozy after dialysis  AXR (1/27): unremarkable  U/A: pending collection  Monitor    Dyslipidemia- (present on admission)  Assessment & Plan  On Lipitor

## 2021-01-28 NOTE — THERAPY
Occupational Therapy  Daily Treatment     Patient Name: Luis Sepulveda  Age:  75 y.o., Sex:  male  Medical Record #: [id number]  Today's Date: 1/28/2021     Precautions  Precautions: (P) Fall Risk, Non Weight Bearing Right Lower Extremity  Comments: (P) R BKA, R IPOP, dialysis Tues, Thurs, Sat, L arm fistula, skin breakdown L heel    Safety   ADL Safety : Modified Independent, Requires Supervision for Safety  Bathroom Safety: Requires Supervision for Safety  Comments: See below notes for ADL performance details.    Subjective    Patient sitting in w/c in room.  Agreeable to OT.       Objective       01/28/21 1231   Precautions   Precautions Fall Risk;Non Weight Bearing Right Lower Extremity   Comments R BKA, R IPOP, dialysis Tues, Thurs, Sat, L arm fistula, skin breakdown L heel   Sitting Upper Body Exercises   Tricep Press 3 sets of 15;Bilateral;Weight (See Comments for lbs)  (35, 45, 45 lbs on rickshaw facing forward)   Upper Extremity Bike Minutes / Rest Breaks (See Comments)  (water bike seated x 12 minutes level 3)   Other Exercise Seated EOM core strengthening, 3 x 10 reps anterior and posterior leans.   Interdisciplinary Plan of Care Collaboration   IDT Collaboration with  Occupational Therapist   Patient Position at End of Therapy Seated;Self Releasing Lap Belt Applied  (pt taking self back to room in w/c)   Collaboration Comments POC   OT Total Time Spent   OT Individual Total Time Spent (Mins) 60   OT Charge Group   OT Therapy Activity 1   OT Therapeutic Exercise  3     Squat pivot transfer to the left from w/c < > mat with close SBA and verbal cues/demonstration.      Wheelchair mobility mod I from room to back gym to front gym to room.    Assessment    Patient tolerated all activities well and is highly motivated to increase strength for improved safety and independence.    Strengths: Alert and oriented, Effective communication skills, Independent prior level of function, Making steady progress  towards goals, Pleasant and cooperative, Supportive family, Willingly participates in therapeutic activities  Barriers: Decreased endurance, Fatigue, Impaired balance(safety awareness)    Plan    Strength/endurance, standing tolerance/balance; d/c to SNF pending    Occupational Therapy Goals     Problem: Functional Transfers     Dates: Start: 01/25/21       Goal: STG-Within one week, patient will transfer to step in shower     Dates: Start: 01/25/21       Description: 1) Individualized Goal: supervision with AE/DME as needed  2) Interventions:  OT Group Therapy, OT Self Care/ADL, OT Cognitive Skill Dev, OT Community Reintegration, OT Manual Ther Technique, OT Neuro Re-Ed/Balance, OT Therapeutic Activity, OT Evaluation, and OT Therapeutic Exercise                  Problem: IADL's     Dates: Start: 01/25/21       Goal: STG-Within one week, patient will access kitchen area     Dates: Start: 01/25/21       Description: 1) Individualized Goal:  supervision from w/c level.    2) Interventions:  OT Group Therapy, OT Self Care/ADL, OT Cognitive Skill Dev, OT Community Reintegration, OT Manual Ther Technique, OT Neuro Re-Ed/Balance, OT Therapeutic Activity, OT Evaluation, and OT Therapeutic Exercise                  Problem: OT Long Term Goals     Dates: Start: 01/14/21       Goal: LTG-By discharge, patient will complete basic self care tasks     Dates: Start: 01/14/21       Description: 1) Individualized Goal:  with mod I and AE as needed  2) Interventions:  OT Group Therapy, OT Self Care/ADL, OT Cognitive Skill Dev, OT Community Reintegration, OT Manual Ther Technique, OT Neuro Re-Ed/Balance, OT Therapeutic Activity, OT Evaluation, and OT Therapeutic Exercise      Note:     Goal Note filed on 01/14/21 9269 by Zeina Taylor MS,OTR/L    1) Individualized Goal:  with mod I and AE as needed  2) Interventions:  OT Group Therapy, OT Self Care/ADL, OT Cognitive Skill Dev, OT Community Reintegration, OT Manual Ther Technique,  OT Neuro Re-Ed/Balance, OT Therapeutic Activity, OT Evaluation, and OT Therapeutic Exercise                   Goal: LTG-By discharge, patient will perform bathroom transfers     Dates: Start: 01/14/21       Description: 1) Individualized Goal:  with mod I and AE as needed  2) Interventions:  OT Group Therapy, OT Self Care/ADL, OT Cognitive Skill Dev, OT Community Reintegration, OT Manual Ther Technique, OT Neuro Re-Ed/Balance, OT Therapeutic Activity, OT Evaluation, and OT Therapeutic Exercise       Note:     Goal Note filed on 01/14/21 9181 by Zeina Taylor MS,OTR/L    1) Individualized Goal:  with mod I and AE as needed  2) Interventions:  OT Group Therapy, OT Self Care/ADL, OT Cognitive Skill Dev, OT Community Reintegration, OT Manual Ther Technique, OT Neuro Re-Ed/Balance, OT Therapeutic Activity, OT Evaluation, and OT Therapeutic Exercise

## 2021-01-28 NOTE — THERAPY
Physical Therapy   Daily Treatment     Patient Name: Luis Sepulveda  Age:  75 y.o., Sex:  male  Medical Record #: [id number]  Today's Date: 1/28/2021     Precautions  Precautions: Fall Risk, Non Weight Bearing Right Lower Extremity  Comments: R BKA, R IPOP, dialysis Tues, Thurs, Sat, L arm fistula, skin breakdown L heel    Subjective    Pt reports he has no concerns about DC to SNF     Objective       01/28/21 0816   Interdisciplinary Plan of Care Collaboration   Patient Position at End of Therapy Seated;Call Light within Reach;Tray Table within Reach   PT Total Time Spent   PT Individual Total Time Spent (Mins) 60   PT Charge Group   PT Gait Training 2   PT Therapeutic Activities 2     See IRFPAI for DC assessment results    Assessment    Pt performs DC assessment at CGA level for standing mobility - family not able to provide at this time. Pt will benefit from ongoing skilled PT at SNF.     Strengths: Able to follow instructions, Pleasant and cooperative, Motivated for self care and independence, Independent prior level of function, Willingly participates in therapeutic activities  Barriers: Generalized weakness, Impaired balance, Limited mobility, Impaired insight/denial of deficits, Poor family support    Plan    Exercise, STS practice, gait, BKA edu    Physical Therapy Problems     Problem: Mobility Transfers     Dates: Start: 01/25/21       Goal: STG-Within one week, patient will transfer bed to chair     Dates: Start: 01/25/21       Description: 1) Individualized goal:  At SPV with LRD In order to maximize self mobility  2) Interventions:  PT Gait Training, PT Therapeutic Exercises, PT Neuro Re-Ed/Balance, PT Therapeutic Activity, PT Manual Therapy, and PT Evaluation    Note:     Goal Note filed on 01/28/21 0931 by Christian Mcadams, PT    [facility]                        Problem: PT-Long Term Goals     Dates: Start: 01/14/21       Goal: LTG-By discharge, patient will propel wheelchair     Dates: Start:  01/14/21       Description: 1) Individualized goal:  150 ft At mod I indoor and outdoor in order to improve activity tolerance propelling himself around facility  2) Interventions:  PT Gait Training, PT Therapeutic Exercises, PT Neuro Re-Ed/Balance, PT Therapeutic Activity, PT Manual Therapy, and PT Evaluation    Note:     Goal Note filed on 01/28/21 0931 by Christian Mcadams, PT    Indoor only                  Goal: LTG-By discharge, patient will transfer one surface to another     Dates: Start: 01/14/21       Description: 1) Individualized goal:  At mod I with LRD In order to maximize self mobility  2) Interventions:  PT Gait Training, PT Therapeutic Exercises, PT Neuro Re-Ed/Balance, PT Therapeutic Activity, PT Manual Therapy, and PT Evaluation    Note:     Goal Note filed on 01/28/21 0931 by Christian Mcadams, PT    [facility]                  Goal: LTG-By discharge, patient will transfer in/out of a car     Dates: Start: 01/14/21       Description: 1) Individualized goal:  At SPV with LRD In order to maximize self mobility  2) Interventions:  PT Gait Training, PT Therapeutic Exercises, PT Neuro Re-Ed/Balance, PT Therapeutic Activity, PT Manual Therapy, and PT Evaluation    Note:     Goal Note filed on 01/28/21 0931 by Christian Mcadams, PT    [facility]

## 2021-01-28 NOTE — THERAPY
Speech Language Pathology  Daily Treatment     Patient Name: Luis Sepulveda  Age:  75 y.o., Sex:  male  Medical Record #: [id number]  Today's Date: 1/28/2021     Precautions  Precautions: Fall Risk, Non Weight Bearing Right Lower Extremity  Comments: R BKA, R IPOP, dialysis Tues, Thurs, Sat, L arm fistula, skin breakdown L heel    Subjective    Pt pleasant and cooperative, reporting feeling better today however following completion of cognitive task pt reporting feeling dizzy.      Objective       01/28/21 1033   SLP Total Time Spent   SLP Individual Total Time Spent (Mins) 30   Treatment Charges   SLP Cognitive Skill Development First 15 Minutes 1   SLP Cognitive Skill Development Additional 15 Minutes 1       Assessment    Pt presented with attention task. Pt anticipated that he would locate 90% of targets. On initial trial pt located 8/15 targets, increased to 13/15 with additional review and 15/15 provided MOD A. Reinforced cont use of taking time, working slowly to decrease errors and checking work to increase ease and accuracy.     Strengths: Able to follow instructions, Alert and oriented, Good insight into deficits/needs, Making steady progress towards goals, Independent prior level of function, Motivated for self care and independence, Pleasant and cooperative, Willingly participates in therapeutic activities  Barriers: Impaired functional cognition(decreased short term memory, mild decreased attention)    Plan    Pt to d/c tomorrow     Speech Therapy Problems     Problem: Memory STGs     Dates: Start: 01/14/21       Goal: STG-Within one week, patient will     Dates: Start: 01/14/21       Description: 1) Individualized goal:  complete verbal recall tasks with brief (5-10 minute) delay with 70% accuracy provided MIN cues.   2) Interventions:  SLP Self Care / ADL Training , SLP Cognitive Skill Development, and SLP Group Treatment                Problem: Speech/Swallowing LTGs     Dates: Start: 01/14/21        Goal: LTG-By discharge, patient will     Dates: Start: 01/14/21       Description: 1) Individualized goal:  complete functional problem solving and recall information with 80% accuracy and MOD I.   2) Interventions:  SLP Self Care / ADL Training , SLP Cognitive Skill Development, and SLP Group Treatment

## 2021-01-28 NOTE — CARE PLAN
Problem: PT-Long Term Goals  Goal: LTG-By discharge, patient will propel wheelchair  Description: 1) Individualized goal:  150 ft At mod I indoor and outdoor in order to improve activity tolerance propelling himself around facility  2) Interventions:  PT Gait Training, PT Therapeutic Exercises, PT Neuro Re-Ed/Balance, PT Therapeutic Activity, PT Manual Therapy, and PT Evaluation  Outcome: NOT MET  Note: Indoor only  Goal: LTG-By discharge, patient will transfer one surface to another  Description: 1) Individualized goal:  At mod I with LRD In order to maximize self mobility  2) Interventions:  PT Gait Training, PT Therapeutic Exercises, PT Neuro Re-Ed/Balance, PT Therapeutic Activity, PT Manual Therapy, and PT Evaluation  Outcome: NOT MET  Note: CGA  Goal: LTG-By discharge, patient will transfer in/out of a car  Description: 1) Individualized goal:  At SPV with LRD In order to maximize self mobility  2) Interventions:  PT Gait Training, PT Therapeutic Exercises, PT Neuro Re-Ed/Balance, PT Therapeutic Activity, PT Manual Therapy, and PT Evaluation  Outcome: NOT MET  Note: CGA     Problem: Mobility Transfers  Goal: STG-Within one week, patient will transfer bed to chair  Description: 1) Individualized goal:  At SPV with LRD In order to maximize self mobility  2) Interventions:  PT Gait Training, PT Therapeutic Exercises, PT Neuro Re-Ed/Balance, PT Therapeutic Activity, PT Manual Therapy, and PT Evaluation  Outcome: NOT MET  Note: CGA     Problem: Mobility  Goal: STG-Within one week, patient will propel wheelchair [facility]  Description: 1) Individualized goal:  150 ft At mod I in order to improve activity tolerance propelling himself around facility  2) Interventions:  PT Gait Training, PT Therapeutic Exercises, PT Neuro Re-Ed/Balance, PT Therapeutic Activity, PT Manual Therapy, and PT Evaluation    Outcome: MET

## 2021-01-29 NOTE — CARE PLAN
Problem: Knowledge Deficit  Goal: Knowledge of disease process/condition, treatment plan, diagnostic tests, and medications will improve  Outcome: PROGRESSING AS EXPECTED    Discussed plan of care for today w/ pt this am, he is A+O, he verbalizes understanding of his plan of care, no questions. Emotional support given. Reinforcing education as necessary.       Problem: Pain Management  Goal: Pain level will decrease to patient's comfort goal  Outcome: PROGRESSING AS EXPECTED    Denies need for pain meds. Assessing every four hrs for pain per protocol; see doc flowsheets   '

## 2021-01-29 NOTE — THERAPY
Speech Language Pathology  Daily Treatment     Patient Name: Luis Sepulveda  Age:  75 y.o., Sex:  male  Medical Record #: [id number]  Today's Date: 1/29/2021     Precautions  Precautions: Fall Risk, Non Weight Bearing Right Lower Extremity  Comments: R BKA, R IPOP, dialysis Tues, Thurs, Sat, L arm fistula, skin breakdown L heel    Subjective    Pt pleasant and cooperative, reporting feeling well.      Objective       01/29/21 0933   SLP Total Time Spent   SLP Individual Total Time Spent (Mins) 30   Treatment Charges   SLP Cognitive Skill Development First 15 Minutes 1   SLP Cognitive Skill Development Additional 15 Minutes 1       Assessment    Pt presented with keeping in mind task to address attention and recall of rules. Pt completed task with 100% accuracy with the exception of single calculation error and single error related to rules.      Strengths: Able to follow instructions, Alert and oriented, Good insight into deficits/needs, Making steady progress towards goals, Independent prior level of function, Motivated for self care and independence, Pleasant and cooperative, Willingly participates in therapeutic activities  Barriers: Impaired functional cognition(decreased short term memory, mild decreased attention)    Plan    D/c to SNF delayed and bed lost, pt to d/c skilled Monday  Cont to address working memory and verbal recall    Speech Therapy Problems     Problem: Memory STGs     Dates: Start: 01/14/21       Goal: STG-Within one week, patient will     Dates: Start: 01/14/21       Description: 1) Individualized goal:  complete verbal recall tasks with brief (5-10 minute) delay with 70% accuracy provided MIN cues.   2) Interventions:  SLP Self Care / ADL Training , SLP Cognitive Skill Development, and SLP Group Treatment                Problem: Speech/Swallowing LTGs     Dates: Start: 01/14/21       Goal: LTG-By discharge, patient will     Dates: Start: 01/14/21       Description: 1) Individualized  goal:  complete functional problem solving and recall information with 80% accuracy and MOD I.   2) Interventions:  SLP Self Care / ADL Training , SLP Cognitive Skill Development, and SLP Group Treatment

## 2021-01-29 NOTE — THERAPY
Missed Therapy     Patient Name: Luis Sepulveda  Age:  75 y.o., Sex:  male  Medical Record #: [id number]  Today's Date: 1/29/2021    Discussed missed therapy with nursing, scheduling.       01/29/21 1310   Therapy Missed   Missed Therapy (Minutes) 60   Reason For Missed Therapy Non-Medical - Other (Please Comment)  (pt d/c to SNF today)

## 2021-01-29 NOTE — CARE PLAN
Problem: Speech/Swallowing LTGs  Goal: LTG-By discharge, patient will  Description: 1) Individualized goal:  complete functional problem solving and recall information with 80% accuracy and MOD I.   2) Interventions:  SLP Self Care / ADL Training , SLP Cognitive Skill Development, and SLP Group Treatment      Outcome: MET     Problem: Memory STGs  Goal: STG-Within one week, patient will  Description: 1) Individualized goal:  complete verbal recall tasks with brief (5-10 minute) delay with 70% accuracy provided MIN cues.   2) Interventions:  SLP Self Care / ADL Training , SLP Cognitive Skill Development, and SLP Group Treatment  Outcome: MET

## 2021-01-29 NOTE — DISCHARGE PLANNING
Per Al[facility] at [facility], they will pick patient up today at 1300.  Dr. Cherry is accepting physician.  CM notified.

## 2021-01-29 NOTE — CARE PLAN
Problem: Safety  Goal: Will remain free from injury  Outcome: PROGRESSING AS EXPECTED  Note: Pt uses call light  appropriately. Waits for assistance and does not attempt self transfer this shift. Able to verbalize needs.      Problem: Pain Management  Goal: Pain level will decrease to patient's comfort goal  Outcome: PROGRESSING AS EXPECTED  Note: Tylenol given at bedtime for c/o generalized aches with adequate relief. Pt sleeps good. Will continue to monitor.

## 2021-01-29 NOTE — PROGRESS NOTES
[facility] Progress Note     Hemodialysis treatment ordered today per Dr. Hodges x 3.5  hours.   Treatment initiated at 1435 ended at 1807.      Patient tolerated tx; see paper flow sheet for details.      Net UF 2500 mL.      Needles removed from access site. Dressings applied and sites held x 10 minutes; verified no bleeding. Positive bruit/thrill post tx.   Staff RN to monitor AVF/G for breakthrough bleeding. Should breakthrough bleeding occur, staff RN to apply pressure to access sites until bleeding resolved.   Notify Dialysis and Nephrologist for follow-up.     Report given to Primary RN EDGARDO Carlson.

## 2021-01-29 NOTE — DISCHARGE PLANNING
Case management Summary:   Met with patient prior to discharge.     Reviewed all follow up appointments.     Referral made to [facility] and they have accepted referral and are ready to follow.      During hospitalization, I have provided support and education and have been available for questions and information during hours of operation, communicated with therapy team and MD along with providing links/resources  to outside services.    Patient verbalizes agreement with all plans and has an understanding of the next steps within the post acute services.     Individualized Goals:   1.  To return to PLOF at home   2.  To be able to walk from bedroom to bath and to kitchen    Outcome:   1. Patient to continue therapies at a skilled level in hopes to meet his goals above.

## 2021-01-29 NOTE — PROGRESS NOTES
"[facility] Nephrology Progress Note    Author: Sharon ECKERT  Collaborating Physician: Kulwant Hodges MD    Date of Service  1/29/2021    Chief Complaint  A 75-year-old male with end-stage renal disease   on chronic hemodialysis Tuesdays, Thursdays and Saturdays.  The patient was   admitted on 12/31 for right below-the-knee amputation.  He had wound and   gangrene of the right foot.  He has had some peripheral vascular procedures   previously.  He did have a right ray amputation in 11/2020.  He has had a   nonhealing wound and he was admitted for right below-the-knee amputation that   was performed on 12/31.  We have been consulted to assist in his dialysis   needs.  He had dialysis on 12/31 prior to admission.  Today, he is feeling   dyspneic.  He has no cough or sputum production.    Daily Nephrology Summary   1/13- Transferred to Rehab  1/14 - Pt sitting up in W/C in room, denies any complaints/concerns, due for HD today, BP labile, K+ 3.1, per chart review-penile discharge cx sent  1/15 - Pt getting up to restroom to shower with assistance, HD yesterday with 2.1L UF, K+ corrected to 3.6, iron stores replete, no growth on penile wound cx, VSS on RA   1/16 - No overnight events, HD later today.  No labs for review.  Sitting up at bedside eating lunch.  No complaints.   1/18 - No new events. Pt with palacios. C/O GI issues, bowels are \"loose\". No new events.   1/19 - HD today. VSS. No new events.Pt seen during therapy. C/O about palacios catheter.  1/20 - HD yest, UF 1.7L. Occult blood stool +. CXR improved. Abd xray no obstruction. Abd pain improved after 2 large BMs. C/O palacios catheter.  1/21 - Voiding trial on Mon. Due for HD today. Sleepy today 2/2 sleeping pill last hs, slept very well.  1/21 - Pt no longer with abd pain. C/O \"sore bottom\" and palacios catheter discomfort.  1/22 - Due to HD this am. Pt feels pretty good. Palacios removed. Pt denies pain but more discomfort once he gets up moving around. No abd " discomfort this am.  01/25- Up in wheelchair, feeling well.  No pain, n/v, sob.  Reports he was able to urinate this am.  No overnight events.   01/26 - Laying in bed resting comfortably.  HD later today VSS, UOP ~ 375.  No overnight events.  01/27 - Reports episodes of vomiting last night, he does not believe it had to due with HD.  States he has thick mucous in throat.  HD with 3L uf yesterday.   [facility]   01/28 - Pt resting in bed, says he feels much improved this AM, denies N/V, labs and imaging reviewed, VSS, due for HD today   01/29 - HD yesterday with 2.5L UF, pt feels well, VSS on RA     Review of Systems  Review of Systems   Constitutional: Negative for chills, fever and malaise/fatigue.   HENT: Negative.    Eyes: Negative.    Respiratory: Negative.    Cardiovascular: Negative.    Gastrointestinal: Negative.  Negative for abdominal pain, nausea and vomiting.   Musculoskeletal: Negative for joint pain and myalgias.        C/O RLE phantom pain  Generalized weakness slowly improving    Skin: Negative.    Neurological: Negative.    Endo/Heme/Allergies: Negative.    Psychiatric/Behavioral: Negative.       Physical Exam  Temp:  [36.4 °C (97.6 °F)-37.1 °C (98.7 °F)] 36.6 °C (97.8 °F)  Pulse:  [63-94] 90  Resp:  [16-18] 17  BP: ()/(54-59) 106/57  SpO2:  [96 %-98 %] 97 %    Physical Exam  Constitutional:       Appearance: Normal appearance.   HENT:      Head: Normocephalic and atraumatic.      Nose: Nose normal.      Mouth/Throat:      Mouth: Mucous membranes are moist.      Pharynx: Oropharynx is clear.   Eyes:      Extraocular Movements: Extraocular movements intact.      Pupils: Pupils are equal, round, and reactive to light.   Neck:      Musculoskeletal: Normal range of motion and neck supple.   Cardiovascular:      Rate and Rhythm: Normal rate and regular rhythm.      Pulses: Normal pulses.      Comments: L AVF +T/B  Pulmonary:      Effort: Pulmonary effort is normal.      Breath  sounds: Normal breath sounds.   Abdominal:      General: Bowel sounds are normal.      Palpations: Abdomen is soft.   Musculoskeletal:         General: No deformity.      Comments: R BKA    Skin:     General: Skin is warm and dry.   Neurological:      General: No focal deficit present.      Mental Status: He is alert and oriented to person, place, and time.   Psychiatric:         Mood and Affect: Mood normal.         Behavior: Behavior normal.         Fluids    Intake/Output Summary (Last 24 hours) at 1/29/2021 0813  Last data filed at 1/29/2021 0305  Gross per 24 hour   Intake 1460 ml   Output 3300 ml   Net -1840 ml       Laboratory  Recent Labs     01/28/21  0531   WBC 6.4   RBC 2.79*   HEMOGLOBIN 8.6*   HEMATOCRIT 27.9*   .0*   MCH 30.8   MCHC 30.8*   RDW 58.3*   PLATELETCT 301   MPV 9.3     Recent Labs     01/28/21  0531   SODIUM 139   POTASSIUM 4.4   CHLORIDE 98   CO2 26   GLUCOSE 106*   BUN 30*   CREATININE 5.61*   CALCIUM 9.0         No results for input(s): NTPROBNP in the last 72 hours.        Imaging  Available labs, imaging and clinical documentation reviewed.     Assessment/Plan  #  End-stage renal disease, on chronic hemodialysis qTTS  - Normally dialyzes at [facility] via L AVF   #  Status post right BKA on 12/31/2020 for diabetic ulcer.  #  Hypertension, controlled  - Currently on no BP medications, BP less than 140/90   #  Anemia: superimposed blood loss in addition to chronic kidney disease.  - Hgb below target   - On ARMANDO   - Iron replete, ferritin elevated   #  Diabetes mellitus.  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  - On calcitriol and calcium acetate   - Calcium acetate currently on hold   - Vit D 48   - Phos 4.5  - iPTH 109   #  Peripheral vascular disease.   #  Paroxysmal atrial fibrillation, on Eliquis and metoprolol.   #  Coronary artery disease, status post stents.  Asymptomatic.  #  History of hyperthyroidism. On methimazole.   #  Dyslipidemia, on statin therapy.   #  Past  history of heavy tobacco use.  #  Pneumonia: s/p IV Zosyn   #  Encephalopathy: resolved  #  Weakness/debility  #  Urinary retention requiring catheter placement  - Chronic oliguria  - Tomas cath removed and pt voiding without issue   - On Flomax   - Referred to Urology   #  Hypokalemia, corrected  #  Hyponatremia in ESRD, mild, improved  #  Penile meatus wound, related to Tomas, healing  - s/p Diflucan for yeast      PLAN:  -Continue hemodialysis qTTS/PRN, next treatment due tomorrow (SAT)  -UF as tolerated  -ARMANDO TIW with HD, goal Hgb 10-11   -Transfuse PRN Hgb <7  -Goal BP <140/90 while maintaining MAP >65 mmHg  -No dietary protein restrictions  -Dose meds for ESRD  -Limit narcotics/sedating meds  -PT/OT/Speech  -Ok to transition to outpatient hemodialysis from Nephrology standpoint when medically cleared.     Thank you,

## 2021-01-29 NOTE — PROGRESS NOTES
HS [id number]=[id number]. No insulin coverage per sliding scale per MAR. HS snack provided and consumed. Will continue to monitor.

## 2021-01-29 NOTE — DISCHARGE PLANNING
Patient accepted at [facility] for today at [facility].  CM updated patient and he is agreeable to transport today at 1pm.  LM on therapy scheduling VM.  Patient will call his son Miguel to update.  Dr. Goodman updated.

## 2021-01-29 NOTE — PROGRESS NOTES
[facility]. Discussed pt's care with charge RN. Called [facility], report given to Pawel MONTOYA. Pt w/o c/o. States he is ready to discharge. Transport here to get pt. Belongings collect. Pt leaves with transport, denies further needs.

## 2021-01-29 NOTE — PROGRESS NOTES
[facility] Medicine Daily Progress Note    Date of Service  1/29/2021    Chief Complaint:  Hypertension  Afib  Diabetes  Leukocytosis  Penile wound    Interval History:  No significant events or changes since last visit    Review of Systems  Review of Systems   Constitutional: Negative for chills and fever.   Respiratory: Negative for shortness of breath.    Cardiovascular: Negative for chest pain.   Gastrointestinal: Negative for abdominal pain, diarrhea, nausea and vomiting.   Psychiatric/Behavioral: The patient is not nervous/anxious.         Physical Exam  Temp:  [36.4 °C (97.6 °F)-37.1 °C (98.7 °F)] 36.6 °C (97.8 °F)  Pulse:  [63-94] 90  Resp:  [16-18] 17  BP: ()/(54-59) 106/57  SpO2:  [96 %-98 %] 97 %    Physical Exam  Vitals signs and nursing note reviewed.   Constitutional:       Appearance: Normal appearance.   HENT:      Head: Atraumatic.   Eyes:      Conjunctiva/sclera: Conjunctivae normal.      Pupils: Pupils are equal, round, and reactive to light.   Neck:      Musculoskeletal: Normal range of motion and neck supple.   Cardiovascular:      Rate and Rhythm: Normal rate and regular rhythm.   Pulmonary:      Effort: Pulmonary effort is normal.      Breath sounds: Normal breath sounds.   Abdominal:      General: Bowel sounds are normal.      Palpations: Abdomen is soft.   Musculoskeletal:      Right lower leg: No edema.      Left lower leg: No edema.      Comments: Has right BKA   Skin:     General: Skin is warm and dry.   Neurological:      Mental Status: He is alert and oriented to person, place, and time.   Psychiatric:         Mood and Affect: Mood normal.         Behavior: Behavior normal.         Fluids    Intake/Output Summary (Last 24 hours) at 1/29/2021 0849  Last data filed at 1/29/2021 0305  Gross per 24 hour   Intake 1460 ml   Output 3300 ml   Net -1840 ml       Laboratory  Recent Labs     01/28/21  0531   WBC 6.4   RBC 2.79*   HEMOGLOBIN 8.6*   HEMATOCRIT 27.9*   .0*   MCH 30.8    MCHC 30.8*   RDW 58.3*   PLATELETCT 301   MPV 9.3     Recent Labs     01/28/21  0531   SODIUM 139   POTASSIUM 4.4   CHLORIDE 98   CO2 26   GLUCOSE 106*   BUN 30*   CREATININE 5.61*   CALCIUM 9.0                   Imaging    Assessment/Plan  * 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)- (present on admission)  Assessment & Plan  2nd to PVD & diabetes with non-healing wound and reported gangrene  S/P right BKA (12/31)    Abrasion of penis with infection- (present on admission)  Assessment & Plan  Thought to be 2nd to palacios trauma  Wound Cx: show yeast  S/P Diflucan  Repeat UA: negative  S/P Diflucan for suspected balanitis (1/27)    Paroxysmal A-fib (HCC)- (present on admission)  Assessment & Plan  HR ok  Off Lopressor (for low BP)  On Eliquis  Monitor    End stage renal disease on dialysis ([facility])- (present on admission)  Assessment & Plan  On HD (T, Th, Sat)    Type 2 diabetes mellitus with kidney complication, with long-term current use of insulin ([facility])- (present on admission)  Assessment & Plan  Hba1c: 7.8 (11/14/20) --> 5.2 (1/15)  BS good  On Lantus: 8 units qam   Will d/c accuchecks since BS have been good and no coverage needed (1/29)  Note: home meds were Lantus 5 units qhs  Cont to monitor    Anemia- (present on admission)  Assessment & Plan  Has a recent hx -- likely 2nd to kidney disease  Recent surgery likely a component  Hb stable: 8.8  Fe: 65, sats 35%  B12: 852  Folate: 8.5  FOB (+)  On Epogen  On PPI bid  Suggest GI F/U  Monitor    Essential hypertension- (present on admission)  Assessment & Plan  BP ok but occ dips a little lower recently  Off Lopressor  Currently not on any hypertensive meds  Note: on Flomax  Cont to monitor    Hyperthyroidism- (present on admission)  Assessment & Plan  On Tapazole    Nausea & vomiting  Assessment & Plan  (1/27): had 2 episodes of N/V   (1/29): no N/V since 1/27  Has been afebrile  No leukocytosis  AXR (1/27): unremarkable  U/A: negative  Pt  feels that it may be from his dialysis -- he often gets lightheaded/woozy after dialysis  Monitor    Dyslipidemia- (present on admission)  Assessment & Plan  On Lipitor

## 2021-01-29 NOTE — CARE PLAN
Problem: Functional Transfers  Goal: STG-Within one week, patient will transfer to step in shower  Description: 1) Individualized Goal: supervision with AE/DME as needed  2) Interventions:  OT Group Therapy, OT Self Care/ADL, OT Cognitive Skill Dev, OT Community Reintegration, OT Manual Ther Technique, OT Neuro Re-Ed/Balance, OT Therapeutic Activity, OT Evaluation, and OT Therapeutic Exercise    Outcome: MET     Problem: OT Long Term Goals  Goal: LTG-By discharge, patient will complete basic self care tasks  Description: 1) Individualized Goal:  with mod I and AE as needed  2) Interventions:  OT Group Therapy, OT Self Care/ADL, OT Cognitive Skill Dev, OT Community Reintegration, OT Manual Ther Technique, OT Neuro Re-Ed/Balance, OT Therapeutic Activity, OT Evaluation, and OT Therapeutic Exercise    Outcome: DISCHARGED-GOAL NOT MET  Note: Requires min A-supv for LB dressing and bathing    Goal: LTG-By discharge, patient will perform bathroom transfers  Description: 1) Individualized Goal:  with mod I and AE as needed  2) Interventions:  OT Group Therapy, OT Self Care/ADL, OT Cognitive Skill Dev, OT Community Reintegration, OT Manual Ther Technique, OT Neuro Re-Ed/Balance, OT Therapeutic Activity, OT Evaluation, and OT Therapeutic Exercise     Outcome: DISCHARGED-GOAL NOT MET  Note: Requires min A to supv.      Problem: IADL's  Goal: STG-Within one week, patient will access kitchen area  Description: 1) Individualized Goal:  supervision from w/c level.    2) Interventions:  OT Group Therapy, OT Self Care/ADL, OT Cognitive Skill Dev, OT Community Reintegration, OT Manual Ther Technique, OT Neuro Re-Ed/Balance, OT Therapeutic Activity, OT Evaluation, and OT Therapeutic Exercise    Outcome: DISCHARGED-GOAL NOT MET  Note: Unable to address during rehab stay.

## 2021-02-02 NOTE — TELEPHONE ENCOUNTER
"ESTABLISHED PATIENT PRE-VISIT PLANNING     02/02/21 Called patient. No answer. Left voice message. Offered Virtual Visit. Advised download Zoom. Rollstream active. Advise Office Visit scheduled Wednesday, 02/03/21@8:30AM.    Patient was contacted to complete PVP.     Note: Patient will not be contacted if there is no indication to call.     1.  Reviewed notes from the last few office visits within the medical group: Yes    2.  If any orders were placed at last visit or intended to be done for this visit (i.e. 6 mos follow-up), do we have Results/Consult Notes?         •  Labs - Labs ordered, NOT completed. Patient advised to complete prior to next appointment.     Per Dr. Brasher's Telemedicine visit on  10/20/2020: \"Follow-up: within 2-3 weeks, labs\".      Note: If patient appointment is for lab review and patient did not complete labs, check with provider if OK to reschedule patient until labs completed.       •  Imaging - Imaging ordered, completed and results are in chart.       •  Referrals - Referral ordered, patient has NOT been seen.     -Called & spoke to [facility]: Patient NO SHOWED appointment on 1/26/21@8:30AM.    -Pt has upcoming appointment scheduled with [facility] on 02/16/21.     3. Is this appointment scheduled as a Hospital Follow-Up? Yes, visit was at [facility].    Emergency Department Consultation Note on 12/07/2020 and discharge on 12/08/20 in patient's chart.    4.  Immunizations were updated in Epic using Reconcile Outside Information activity? Yes    5.  Patient is due for the following Health Maintenance Topics:   Health Maintenance Due   Topic Date Due   • COVID-19 Vaccine (1 of 2) 12/09/1961   • IMM DTaP/Tdap/Td Vaccine (1 - Tdap) 12/09/1962   • IMM ZOSTER VACCINES (1 of 2) 12/09/1993   • RETINAL SCREENING  09/25/2019       6.  AHA (Pulse8) form printed for Provider? N/A    "

## 2021-04-27 LAB
ALBUMIN SERPL-MCNC: 3.6 G/DL (ref 3.4–5)
ALP SERPL-CCNC: 134 U/L (ref 45–117)
ALT SERPL-CCNC: 25 U/L (ref 12–78)
ANION GAP SERPL CALC-SCNC: 4 MMOL/L (ref 5–15)
APTT BLD: 25 SECONDS (ref 25–31)
BASOPHILS # BLD AUTO: 0 X10^3/UL (ref 0–0.1)
BASOPHILS NFR BLD AUTO: 1 % (ref 0–1)
BILIRUB SERPL-MCNC: 0.4 MG/DL (ref 0.2–1)
CALCIUM SERPL-MCNC: 9.7 MG/DL (ref 8.5–10.1)
CHLORIDE SERPL-SCNC: 101 MMOL/L (ref 98–107)
CREAT SERPL-MCNC: 3.34 MG/DL (ref 0.7–1.3)
EOSINOPHIL # BLD AUTO: 0.5 X10^3/UL (ref 0–0.4)
EOSINOPHIL NFR BLD AUTO: 7 % (ref 1–7)
ERYTHROCYTE [DISTWIDTH] IN BLOOD BY AUTOMATED COUNT: 14.8 % (ref 9.4–14.8)
INR PPP: 1.08 (ref 0.93–1.1)
LYMPHOCYTES # BLD AUTO: 1.4 X10^3/UL (ref 1–3.4)
LYMPHOCYTES NFR BLD AUTO: 19 % (ref 22–44)
MCH RBC QN AUTO: 34 PG (ref 27.5–34.5)
MCHC RBC AUTO-ENTMCNC: 34.8 G/DL (ref 33.2–36.2)
MD: NO
MONOCYTES # BLD AUTO: 0.7 X10^3/UL (ref 0.2–0.8)
MONOCYTES NFR BLD AUTO: 9 % (ref 2–9)
NEUTROPHILS # BLD AUTO: 4.7 X10^3/UL (ref 1.8–6.8)
NEUTROPHILS NFR BLD AUTO: 64 % (ref 42–75)
PLATELET # BLD AUTO: 228 X10^3/UL (ref 130–400)
PMV BLD AUTO: 6.8 FL (ref 7.4–10.4)
PROT SERPL-MCNC: 7.7 G/DL (ref 6.4–8.2)
PROTHROMBIN TIME: 11.5 SECONDS (ref 9.6–11.5)
RBC # BLD AUTO: 3.34 X10^6/UL (ref 4.38–5.82)

## 2021-04-28 ENCOUNTER — HOSPITAL ENCOUNTER (OUTPATIENT)
Dept: HOSPITAL 8 - SDC | Age: 76
Discharge: HOME | End: 2021-04-28
Attending: SURGERY
Payer: MEDICARE

## 2021-04-28 VITALS — SYSTOLIC BLOOD PRESSURE: 96 MMHG | DIASTOLIC BLOOD PRESSURE: 50 MMHG

## 2021-04-28 VITALS — BODY MASS INDEX: 27.35 KG/M2 | HEIGHT: 66 IN | WEIGHT: 170.2 LBS

## 2021-04-28 DIAGNOSIS — I48.91: ICD-10-CM

## 2021-04-28 DIAGNOSIS — Z20.822: ICD-10-CM

## 2021-04-28 DIAGNOSIS — T82.898A: Primary | ICD-10-CM

## 2021-04-28 DIAGNOSIS — Z89.511: ICD-10-CM

## 2021-04-28 DIAGNOSIS — N18.6: ICD-10-CM

## 2021-04-28 DIAGNOSIS — Z87.891: ICD-10-CM
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
## 2021-04-28 DIAGNOSIS — I12.0: ICD-10-CM

## 2021-04-28 DIAGNOSIS — Z79.01: ICD-10-CM

## 2021-04-28 DIAGNOSIS — Z79.4: ICD-10-CM

## 2021-04-28 DIAGNOSIS — Z95.5: ICD-10-CM

## 2021-04-28 DIAGNOSIS — I25.10: ICD-10-CM

## 2021-04-28 DIAGNOSIS — E11.22: ICD-10-CM

## 2021-04-28 DIAGNOSIS — Z79.899: ICD-10-CM

## 2021-04-28 DIAGNOSIS — Z99.2: ICD-10-CM

## 2021-04-28 PROCEDURE — 85610 PROTHROMBIN TIME: CPT

## 2021-04-28 PROCEDURE — 80053 COMPREHEN METABOLIC PANEL: CPT

## 2021-04-28 PROCEDURE — 93005 ELECTROCARDIOGRAM TRACING: CPT

## 2021-04-28 PROCEDURE — 85025 COMPLETE CBC W/AUTO DIFF WBC: CPT

## 2021-04-28 PROCEDURE — 36415 COLL VENOUS BLD VENIPUNCTURE: CPT

## 2021-04-28 PROCEDURE — 82962 GLUCOSE BLOOD TEST: CPT

## 2021-04-28 PROCEDURE — 85730 THROMBOPLASTIN TIME PARTIAL: CPT

## 2021-04-28 PROCEDURE — 36838 DIST REVAS LIGATION HEMO: CPT

## 2021-04-28 PROCEDURE — U0003 INFECTIOUS AGENT DETECTION BY NUCLEIC ACID (DNA OR RNA); SEVERE ACUTE RESPIRATORY SYNDROME CORONAVIRUS 2 (SARS-COV-2) (CORONAVIRUS DISEASE [COVID-19]), AMPLIFIED PROBE TECHNIQUE, MAKING USE OF HIGH THROUGHPUT TECHNOLOGIES AS DESCRIBED BY CMS-2020-01-R: HCPCS

## 2021-05-10 NOTE — ED TRIAGE NOTES
"Luis Sepulveda  75 y.o.  Chief Complaint   Patient presents with   • Shortness of Breath     Pt reports incureased use of accessory muscles, O2 at home >90%.       Patient to triage in [facility] with above complaint.  Pt reports chronic SOB for past couple of years \"coming and going\" with increased phlegm over past couple of weeks.  Pt reports taking Mucinex with relief in the past. Pt reports having hernia on right side and believes this is what is causing his SOB.     Vitals:    05/10/21 1535   BP: 118/77   Pulse: 88   Resp: 16   Temp: 36.9 °C (98.4 °F)   SpO2: 95%     Triage process explained to patient, apologized for wait time, and returned to lobby.  Pt informed to notify staff of any change in condition. NAD at this time.    "

## 2021-05-11 PROBLEM — I48.91 ATRIAL FIBRILLATION WITH RVR (HCC): Status: ACTIVE | Noted: 2021-01-01

## 2021-05-11 PROBLEM — E87.5 HYPERKALEMIA: Status: ACTIVE | Noted: 2021-01-01

## 2021-05-11 PROBLEM — S98.112A AMPUTATED GREAT TOE OF LEFT FOOT (HCC): Status: ACTIVE | Noted: 2021-01-01

## 2021-05-11 PROBLEM — E10.9 TYPE 1 DIABETES MELLITUS (HCC): Status: ACTIVE | Noted: 2021-01-01

## 2021-05-11 NOTE — HEART FAILURE PROGRAM
Echocardiogram is pending unless this comes back with reduced EF, Dr. Amin has already noted in his consult yesterday that patient does not qualify for a heart failure diagnosis given his renal failure.    Thank you, [place] Cardio RN Navigator [phone]    Addendum 05.12.21 1138: echo still not resulted

## 2021-05-11 NOTE — PROGRESS NOTES
[facility] Cardiology Follow-up Note    Date of Service:    5/11/2021      Name:   Luis Sepulveda   YOB: 1945  Age:   75 y.o.  male   MRN:   [id number]      Chief Complaint: shortness of breath     Primary cardiologist:  Dr. Osborn    HPI:  Mr Sepulveda is a 76 y/o fellow with PAF on low dose Eliquis, CAD, ESRD on HD T, Th, Sa who presented to [facility] on 5/10/21 with shortness of breath.  Apparently lost quite a bit of mass lately and thought to have hypervolemia.    Interim Events:  Patient states felling ok this AM  Periodic shortness of breath   Denies HORTON or orthopnea.  No chest pain  C/o productive cough x 2 years      ROS  Constitutional:  denies fatigue.  Respiratory:  + shortness of breath, + productive cough.  Cardiovascular:  Denies chest pain.  no lower extremity edema.  Denies orthopnea or PND.  : anuric  GI:  Denies nausea/vomiting.  No abdominal distention.  Neuro:  Denies dizziness, syncope.  Hem/lymph: Denies easy bleeding/bruising.  Ext:   L index finger blue, cool, numbness, tingling at times.    All other review of systems reviewed and negative.    Past medical, surgical, social, and family history reviewed and unchanged from admission except as noted in HPI.    Medications: Reviewed in MAR  Current Facility-Administered Medications   Medication Dose Frequency Provider Last Rate Last Admin   • apixaban (ELIQUIS) tablet 5 mg  5 mg BID Macho Lang M.D.   5 mg at 05/11/21 0634   • furosemide (LASIX) injection 80 mg  80 mg Q DAY Macho Lang M.D.   80 mg at 05/11/21 0139   • acetaminophen (Tylenol) tablet 650 mg  650 mg Q4HRS PRN Macho Lang M.D.       • atorvastatin (LIPITOR) tablet 40 mg  40 mg QHS Macho Lagn M.D.   40 mg at 05/11/21 0138   • calcitRIOL (ROCALTROL) capsule 0.25 mcg  0.25 mcg DAILY Macho Lang M.D.   0.25 mcg at 05/11/21 0634   • gabapentin (NEURONTIN) capsule 300 mg  300 mg DAILY Macho Lang M.D.   300 mg at  "05/11/21 0139   • insulin glargine (Semglee) injection  8 Units QAM Macho Lang M.D.   8 Units at 05/11/21 0639   • methimazole (TAPAZOLE) tablet 5 mg  5 mg BID Macho Lang M.D.   5 mg at 05/11/21 0139   • tamsulosin (FLOMAX) capsule 0.8 mg  0.8 mg QHS Macho Lang M.D.   0.8 mg at 05/11/21 0139   • senna-docusate (PERICOLACE or SENOKOT S) 8.6-50 MG per tablet 2 tablet  2 tablet BID Macho Lang M.D.        And   • polyethylene glycol/lytes (MIRALAX) PACKET 1 Packet  1 Packet QDAY PRN Macho Lang M.D.        And   • magnesium hydroxide (MILK OF MAGNESIA) suspension 30 mL  30 mL QDAY PRN Macho Lang M.D.        And   • bisacodyl (DULCOLAX) suppository 10 mg  10 mg QDAY PRN Macho Lang M.D.       • morphine (pf) 4 mg/mL injection 2-4 mg  2-4 mg Q5 MIN PRN Macho Lang M.D.       • nitroglycerin (NITROSTAT) tablet 0.4 mg  0.4 mg Q5 MIN PRN Macho Lang M.D.       • Metoprolol Tartrate (LOPRESSOR) injection 5 mg  5 mg Q5 MIN PRN Macho Lang M.D.       • insulin regular (HumuLIN R,NovoLIN R) injection  1-6 Units 4X/DAY ACHS Macho Lang M.D.   Stopped at 05/11/21 0700    And   • glucose 4 g chewable tablet 16 g  16 g Q15 MIN PRN Macho Lang M.D.        And   • dextrose 50% (D50W) injection 50 mL  50 mL Q15 MIN PRN Macho Lang M.D.       • carvedilol (COREG) tablet 3.125 mg  3.125 mg BID WITH MEALS Macho Lang M.D.   3.125 mg at 05/11/21 0739   • Metoprolol Tartrate (LOPRESSOR) injection 5 mg  5 mg Once Macho Lang M.D.   Stopped at 05/11/21 0000   Last reviewed on 5/10/2021 11:39 PM by Tammi Osorio, PhT    Allergies   Allergen Reactions   • Mircera [Methoxy Polyethylene Glycol-Epoetin Beta] Hives   • Other Drug      \"Opiods\" caused  hallucinations       Physical Exam  Body mass index is 27.47 kg/m². /84   Pulse 87   Temp 36.1 °C (97 °F) (Temporal)   Resp 16   Ht 1.676 m (5' 6\")   Wt 77.2 kg (170 lb 3.1 oz)   SpO2 92%    Vitals:    05/11/21 0101 05/11/21 " "0128 05/11/21 0326 05/11/21 0739   BP: 132/60 121/48 123/91 125/84   Pulse:  83 92 87   Resp:  16 16 16   Temp:  36.4 °C (97.6 °F) 36.2 °C (97.2 °F) 36.1 °C (97 °F)   TempSrc:  Temporal Temporal Temporal   SpO2:  97% 95% 92%   Weight:  77.2 kg (170 lb 3.1 oz)     Height:  1.676 m (5' 6\")      Oxygen Therapy:  Pulse Oximetry: 92 %, O2 (LPM): 0, O2 Delivery Device: None - Room Air    General: no apparent distress  Neck: no JVD   Lungs: normal effort, with crackles to the R base and the base to mid L lung, no wheezing or rhonchi  Heart: normal rate, regular rhythm, no murmur, no rub  EXT: R BKA, L great toe amputation, no lower extremity edema.  Fistula to the L upper ext.  The index finger on the L is cyanotic and cool.  Abdomen: soft, non tender, non distended  Neurological: No focal deficits, no facial asymmetry.  Normal speech  Psychiatric: Appropriate affect, alert and oriented x 3  Skin: Warm extremities, no rash    Labs (personally reviewed):     Lab Results   Component Value Date/Time    SODIUM 135 05/11/2021 06:30 AM    POTASSIUM 5.8 (H) 05/11/2021 06:30 AM    CHLORIDE 95 (L) 05/11/2021 06:30 AM    CO2 26 05/11/2021 06:30 AM    GLUCOSE 124 (H) 05/11/2021 06:30 AM    BUN 47 (H) 05/11/2021 06:30 AM    CREATININE 6.77 (HH) 05/11/2021 06:30 AM     Lab Results   Component Value Date/Time    ALKPHOSPHAT 119 (H) 05/10/2021 09:19 PM    ASTSGOT 12 05/10/2021 09:19 PM    ALTSGPT 8 05/10/2021 09:19 PM    TBILIRUBIN 0.4 05/10/2021 09:19 PM      Lab Results   Component Value Date/Time    CHOLSTRLTOT 158 05/11/2021 03:04 AM     (H) 05/11/2021 03:04 AM    HDL 25 (A) 05/11/2021 03:04 AM    TRIGLYCERIDE 156 (H) 05/11/2021 03:04 AM         Cardiac Imaging and Procedures Review:      Personal Telemetry Review: SR in the 80s.    Echo 11/14/2020:  CONCLUSIONS  Compared to the images of the study done - 04/22/2020 there has been   improvement in LV function.  Normal left ventricular chamber size.  Left ventricular ejection " "fraction is visually estimated to be 55%.  Normal regional wall motion.  Moderately dilated left atrium.  Trace tricuspid regurgitation.  Estimated right ventricular systolic pressure  is 40 mmHg.    [facility] 2019:  CORONARY ARTERIOGRAPHY:  1.  Left main artery:  The left main artery is angiographically normal,   bifurcates into the left anterior descending artery and circumflex artery.  2.  Left anterior descending artery:  The left anterior descending artery   gives rise to a small caliber first diagonal branch, a normal complement of   septal branches and extends around the apex.  The left anterior descending   artery has a long stented segment from the proximal to mid vessel that is   patent.  The remainder of the artery is patent with mild diffuse atheromatous   disease of the distal and terminal vessel.  The jailed diagonal branch has   ostial eccentric 90% stenosis.  3.  Circumflex artery:  The circumflex gives rise to a tiny caliber first   marginal branch, 2 major long second and third marginal branches.  The   circumflex artery is patent with mild focal smooth atheromatous disease.  4.  Right coronary artery:  The right coronary artery gives rise to a   posterior descending artery and posterolateral branch.  The posterior   descending artery ostium has a focal smooth 50% stenosis.       POSTPROCEDURE DIAGNOSES:  1.  Patent coronary stents proximal mid, left anterior descending   artery.  2.  Coronary artery disease involving diagonal \"jailed\" branch   ostial 90% stenosis, posterior descending artery ostial 50% stenosis.    Assessment and Medical Decision Makin   shortness of breath   -    Suspect related to primary pulmonary condition - bronchitis? And pulm edema?  -    unlikely related to primary cardiac etiology, but will get echo.     2   ESRD on HD T, TH, Sat  -    Needs HD today  -    K 5.8  -    Follows with Dr. Tomlin out patient    3   CAD with elevated troponin  -    Troponin peaked at " 657, down-trending  -    Unlikely ACS  -    Get echo    4   PAF  -    Continue low dose Eliquis  -    Maintaining SR today    5   Diabetes mellitus type II  -    A1 C 5.7     6   PAD  -    His L index finger does not look like it's getting sufficient blood flow.   -    States his surgeon knows and it is actually improved  -    S/p R BKA, L great toe amputation.      Will review echo.    Tamiko Hankins PA-C  [facility]

## 2021-05-11 NOTE — CARE PLAN
Problem: Safety  Goal: Will remain free from falls  Outcome: PROGRESSING AS EXPECTED   Patient's risk for injury and falls assessed. Appropriate safety precautions in place. Patient educated to utilize call light for needs. Patient verbalizes understanding.       Problem: Infection  Goal: Will remain free from infection  Outcome: PROGRESSING AS EXPECTED   Standard precautions in place. Hand hygiene performed before and after pt contact.       Problem: Knowledge Deficit  Goal: Knowledge of disease process/condition, treatment plan, diagnostic tests, and medications will improve  Outcome: PROGRESSING AS EXPECTED   Plan of care discussed w/ pt. Encourage pt to voice feelings/concerns regarding treatment plan, diagnostic tests, procedures/results and medications. Answer accordingly.

## 2021-05-11 NOTE — CONSULTS
[facility] -  CONSULTATION NOTE               Author: Nica Mayer M.D. Date & Time: 5/11/2021  1:58 PM       REASON FOR CONSULTATION:   Inpatient hemodialysis management.    CHIEF COMPLAINT:  Shortness of breath    HISTORY OF PRESENT ILLNESS:   75-year-old male with history of ESRD, on TTS HD, A. fib on Eliquis, diabetes mellitus, peripheral vascular disease presented with complaints of increasing shortness of breath along with occasional cough and mucus production for past couple of weeks.  No fever/chills.  Patient is compliant with hemodialysis sessions.  Diagnostic work-up significant for BNP in 35,000s, troponin of 657--> 552, EKG showing atrial fibrillation with RVR.  Chest x-ray: Small left pleural effusion and perihilar infiltrates.  K5.8 this AM.  Nephrology consulted for inpatient hemodialysis.     REVIEW OF SYSTEMS:    General: No malaise  CV: No chest pain  RESP: shortness of breath improved, cough +  GI: No abdominal pain   : No dysuria or gross hematuria  MSK: No trauma  Skin: No rashes  Neuro: No tremors  Psych: No depression    PAST MEDICAL HISTORY:   Past Medical History:   Diagnosis Date   • Arrhythmia     hx a fib   • Arthritis 12/29/2020    knees, ankles, back   • CAD (coronary artery disease)     stents   • Chronic anticoagulation 3/26/2020   • Chronic kidney disease (CKD), stage V (HCC)    • Congestive heart failure (HCC)     hx of   • Dental disorder 12/29/2020    partial upper   • Diabetes    • Hemodialysis patient (HCC)     Tuesday, Thursday, Saturday   • Hypertension    • Kidney failure    • Myocardial infarct (HCC)     ? 2019    • Osteoarthritis    • Peripheral neuropathy    • Pneumonia     per hx   • Sleep apnea     does not use cpap anymore         PAST SURGICAL HISTORY:   Past Surgical History:   Procedure Laterality Date   • KNEE AMPUTATION BELOW Right 12/31/2020    Procedure: AMPUTATION, BELOW KNEE ...;  Surgeon: Leobardo Lynn M.D.;  Location:  "SURGERY [facility];  Service: Orthopedics   • TOE AMPUTATION Right 2020    Procedure: AMPUTATION, TOE GREAT;  Surgeon: Leobardo Lynn M.D.;  Location: SURGERY [facility];  Service: Orthopedics   • TOE AMPUTATION Left 2020    Procedure: AMPUTATION, TOE GREAT;  Surgeon: Leobardo Lynn M.D.;  Location: SURGERY [facility];  Service: Orthopedics   • TENOTOMY Left 2020    Procedure: FLEXOR TENOTOMIES, TWO-FIVE TOES;  Surgeon: Leobardo Lynn M.D.;  Location: SURGERY [facility];  Service: Orthopedics   • APPENDECTOMY     • OTHER CARDIAC SURGERY      stents x1   • OTHER ORTHOPEDIC SURGERY      knee surgery       FAMILY HISTORY:   No family history of kidney disease, mother has heart disease, brother -diabetes mellitus    SOCIAL HISTORY:   No smoking, no etoh, no illicit drugs.  Quit smoking 7 years ago, 55 pack years    HOME MEDICATIONS:   Reviewed and documented in chart    ALLERGIES:  Mircera [methoxy polyethylene glycol-epoetin beta] and Other drug    PHYSICAL EXAM:  VS:  /84 Comment: Simultaneous filing. User may not have seen previous data.  Pulse 87 Comment: Simultaneous filing. User may not have seen previous data.  Temp 36.1 °C (97 °F) (Temporal)   Resp 16   Ht 1.676 m (5' 6\")   Wt 77.2 kg (170 lb 3.1 oz)   SpO2 92%   BMI 27.47 kg/m²   GENERAL: no acute distress  CV: RRR, No edema  RESP: Minimal bibasilar crackles  GI: Soft  MSK: No joint deformities   SKIN: No concerning rashes  NEURO: AOx3  PSYCH: Cooperative  ACCESS: LUE    LABS:  Recent Results (from the past 24 hour(s))   EKG    Collection Time: 05/10/21  8:47 PM   Result Value Ref Range    Report       [facility] Emergency Dept.    Test Date:  2021-05-10  Pt Name:    KLARISSA BRADSHAW              Department: ER  MRN:        [id number]                      Room:  Gender:     Male                         Technician: [id number]  :        1945                   Requested By:ER TRIAGE PROTOCOL  Order " #:    [id number]                    Reading MD: MILADYS AMBROSIO MD    Measurements  Intervals                                Axis  Rate:       96                           P:  IN:                                      QRS:        -67  QRSD:       140                          T:          121  QT:         400  QTc:        506    Interpretive Statements  ATRIAL FIBRILLATION, V-RATE    PAIRED VENTRICULAR PREMATURE COMPLEXES  RBBB AND LAFB  Compared to ECG 01/09/2021 18:41:56  Sinus tachycardia no longer present  Myocardial infarct finding no longer present  Electronically Signed On 5- 22:35:07 PDT by MILADYS AMBROSIO MD     CBC with Differential    Collection Time: 05/10/21  9:19 PM   Result Value Ref Range    WBC 8.3 4.8 - 10.8 K/uL    RBC 2.96 (L) 4.70 - 6.10 M/uL    Hemoglobin 9.9 (L) 14.0 - 18.0 g/dL    Hematocrit 30.6 (L) 42.0 - 52.0 %    .4 (H) 81.4 - 97.8 fL    MCH 33.4 (H) 27.0 - 33.0 pg    MCHC 32.4 (L) 33.7 - 35.3 g/dL    RDW 57.7 (H) 35.9 - 50.0 fL    Platelet Count 249 164 - 446 K/uL    MPV 9.5 9.0 - 12.9 fL    Neutrophils-Polys 69.30 44.00 - 72.00 %    Lymphocytes 19.60 (L) 22.00 - 41.00 %    Monocytes 8.90 0.00 - 13.40 %    Eosinophils 1.10 0.00 - 6.90 %    Basophils 0.50 0.00 - 1.80 %    Immature Granulocytes 0.60 0.00 - 0.90 %    Nucleated RBC 0.00 /100 WBC    Neutrophils (Absolute) 5.75 1.82 - 7.42 K/uL    Lymphs (Absolute) 1.63 1.00 - 4.80 K/uL    Monos (Absolute) 0.74 0.00 - 0.85 K/uL    Eos (Absolute) 0.09 0.00 - 0.51 K/uL    Baso (Absolute) 0.04 0.00 - 0.12 K/uL    Immature Granulocytes (abs) 0.05 0.00 - 0.11 K/uL    NRBC (Absolute) 0.00 K/uL   Comp Metabolic Panel    Collection Time: 05/10/21  9:19 PM   Result Value Ref Range    Sodium 127 (L) 135 - 145 mmol/L    Potassium 5.5 3.6 - 5.5 mmol/L    Chloride 88 (L) 96 - 112 mmol/L    Co2 26 20 - 33 mmol/L    Anion Gap 13.0 7.0 - 16.0    Glucose 129 (H) 65 - 99 mg/dL    Bun 42 (H) 8 - 22 mg/dL    Creatinine 6.41 (HH) 0.50 - 1.40 mg/dL     Calcium 11.3 (H) 8.5 - 10.5 mg/dL    AST(SGOT) 12 12 - 45 U/L    ALT(SGPT) 8 2 - 50 U/L    Alkaline Phosphatase 119 (H) 30 - 99 U/L    Total Bilirubin 0.4 0.1 - 1.5 mg/dL    Albumin 3.8 3.2 - 4.9 g/dL    Total Protein 7.0 6.0 - 8.2 g/dL    Globulin 3.2 1.9 - 3.5 g/dL    A-G Ratio 1.2 g/dL   proBrain Natriuretic Peptide, NT    Collection Time: 05/10/21  9:19 PM   Result Value Ref Range    NT-proBNP >36027 (H) 0 - 125 pg/mL   TROPONIN    Collection Time: 05/10/21  9:19 PM   Result Value Ref Range    Troponin T 657 (H) 6 - 19 ng/L   ESTIMATED GFR    Collection Time: 05/10/21  9:19 PM   Result Value Ref Range    GFR If African American 10 (A) >60 mL/min/1.73 m 2    GFR If Non  8 (A) >60 mL/min/1.73 m 2   COV-2, FLU A/B, AND RSV BY PCR (2-4 HOURS CEPHEID): Collect NP swab in VTM    Collection Time: 05/10/21  9:37 PM    Specimen: Respirate   Result Value Ref Range    Influenza virus A RNA Negative Negative    Influenza virus B, PCR Negative Negative    RSV, PCR Negative Negative    SARS-CoV-2 by PCR NotDetected     SARS-CoV-2 Source NP Swab    CORTISOL    Collection Time: 05/11/21  3:04 AM   Result Value Ref Range    Cortisol 7.9 0.0 - 23.0 ug/dL   Lipid Profile (Tomorrow AM)    Collection Time: 05/11/21  3:04 AM   Result Value Ref Range    Cholesterol,Tot 158 100 - 199 mg/dL    Triglycerides 156 (H) 0 - 149 mg/dL    HDL 25 (A) >=40 mg/dL     (H) <100 mg/dL   proBrain Natriuretic Peptide, NT    Collection Time: 05/11/21  3:04 AM   Result Value Ref Range    NT-proBNP >18909 (H) 0 - 125 pg/mL   CBC WITH DIFFERENTIAL    Collection Time: 05/11/21  6:30 AM   Result Value Ref Range    WBC 6.5 4.8 - 10.8 K/uL    RBC 2.73 (L) 4.70 - 6.10 M/uL    Hemoglobin 9.2 (L) 14.0 - 18.0 g/dL    Hematocrit 27.3 (L) 42.0 - 52.0 %    .0 (H) 81.4 - 97.8 fL    MCH 33.7 (H) 27.0 - 33.0 pg    MCHC 33.7 33.7 - 35.3 g/dL    RDW 55.1 (H) 35.9 - 50.0 fL    Platelet Count 215 164 - 446 K/uL    MPV 9.7 9.0 - 12.9 fL     Neutrophils-Polys 64.30 44.00 - 72.00 %    Lymphocytes 20.40 (L) 22.00 - 41.00 %    Monocytes 10.60 0.00 - 13.40 %    Eosinophils 3.10 0.00 - 6.90 %    Basophils 0.80 0.00 - 1.80 %    Immature Granulocytes 0.80 0.00 - 0.90 %    Nucleated RBC 0.00 /100 WBC    Neutrophils (Absolute) 4.20 1.82 - 7.42 K/uL    Lymphs (Absolute) 1.33 1.00 - 4.80 K/uL    Monos (Absolute) 0.69 0.00 - 0.85 K/uL    Eos (Absolute) 0.20 0.00 - 0.51 K/uL    Baso (Absolute) 0.05 0.00 - 0.12 K/uL    Immature Granulocytes (abs) 0.05 0.00 - 0.11 K/uL    NRBC (Absolute) 0.00 K/uL   HEMOGLOBIN A1C    Collection Time: 05/11/21  6:30 AM   Result Value Ref Range    Glycohemoglobin 5.7 (H) 4.0 - 5.6 %    Est Avg Glucose 117 mg/dL   Basic Metabolic Panel    Collection Time: 05/11/21  6:30 AM   Result Value Ref Range    Sodium 135 135 - 145 mmol/L    Potassium 5.8 (H) 3.6 - 5.5 mmol/L    Chloride 95 (L) 96 - 112 mmol/L    Co2 26 20 - 33 mmol/L    Glucose 124 (H) 65 - 99 mg/dL    Bun 47 (H) 8 - 22 mg/dL    Creatinine 6.77 (HH) 0.50 - 1.40 mg/dL    Calcium 10.8 (H) 8.5 - 10.5 mg/dL    Anion Gap 14.0 7.0 - 16.0   TROPONIN    Collection Time: 05/11/21  6:30 AM   Result Value Ref Range    Troponin T 552 (H) 6 - 19 ng/L   ESTIMATED GFR    Collection Time: 05/11/21  6:30 AM   Result Value Ref Range    GFR If African American 10 (A) >60 mL/min/1.73 m 2    GFR If Non  8 (A) >60 mL/min/1.73 m 2   POCT glucose device results    Collection Time: 05/11/21  6:38 AM   Result Value Ref Range    Glucose - Accu-Ck 103 (H) 65 - 99 mg/dL   POCT glucose device results    Collection Time: 05/11/21 11:20 AM   Result Value Ref Range    Glucose - Accu-Ck 125 (H) 65 - 99 mg/dL       (click the triangle to expand results)    IMAGING:  DX-CHEST-PORTABLE (1 VIEW)   Final Result         1.  Left lung base infiltrate or atelectasis with small left pleural effusion   2.  Cardiomegaly   3.  Perihilar interstitial prominence and bronchial wall cuffing, appearance suggests  changes of underlying bronchial inflammation, consider bronchitis.      EC-ECHOCARDIOGRAM COMPLETE W/O CONT    (Results Pending)       ASSESSMENT:  # Dyspnea likely secondary to fluid overload  # ESRD: normally T/T/S via. AVF.   # Anemia in CKD  # Atrial fibrillation  # Diabetes mellitus  # Hyperthyroidism  # Hyperkalemia  # Elevated troponin    PLAN:  -iHD today, then to continue T/T/S iHD during stay  -Dose all meds for ESRD  -Renal Diet  -I/O  -Epogen with HD  -Continue calcitriol  -Check phosphorus tomorrow a.m.  -Trop down trending, Echo pending  -Continue appropriate home medications.      Thank you for this consult, we will continue to follow.    Nica Mayer MD

## 2021-05-11 NOTE — H&P
Hospital Medicine History & Physical Note    Date of Service  5/10/21    Primary Care Physician  Patito Edgar M.D.    Consultants  Cardiology Dr. Sanchez    Code Status  Full Code   Full code    Chief Complaint  Chief Complaint   Patient presents with   • Shortness of Breath     Pt reports incureased use of accessory muscles, O2 at home >90%.         History of Presenting Illness  75 y.o. male with history of nonoliguric renal failure on hemodialysis, A. fib with RVR and diabetes with peripheral vascular disease who presented 5/10/2021 for evaluation of 4 days of shortness of breath and cough described as trouble clearing his throat but ultimately nonproductive.  He admits a 5-minute episode of dull left-sided nonradiating 3-5 intensity chest pain which resolved spontaneously without intervention it was associated and palpitations with nausea or vomiting.  Work-up reveals BNP greater than 30,000 and a troponin of 647 from a baseline of mid 100s and an EKG with atrial fibrillation with RVR and PVCs but no acute ischemic changes and unchanged from previous.  He is referred to the hospitalist for admission.  At bedside he is asymptomatic he appears euvolemic he denies recent change in diet but admits discontinuation of Coreg 2 or 3 months ago.      Review of Systems  Review of Systems   Constitutional: Negative for fever, malaise/fatigue and weight loss.   HENT: Negative for sore throat and tinnitus.    Eyes: Negative for blurred vision and double vision.   Respiratory: Positive for cough and shortness of breath. Negative for hemoptysis and stridor.    Cardiovascular: Negative for chest pain and palpitations.   Gastrointestinal: Negative for nausea and vomiting.   Genitourinary: Negative for dysuria and urgency.   Musculoskeletal: Negative for myalgias and neck pain.   Skin: Negative for itching and rash.   Neurological: Negative for dizziness and headaches.   Endo/Heme/Allergies: Does not bruise/bleed easily.  "  Psychiatric/Behavioral: Negative for depression. The patient does not have insomnia.        Past Medical History   has a past medical history of Arrhythmia, Arthritis (12/29/2020), CAD (coronary artery disease), Chronic anticoagulation (3/26/2020), Chronic kidney disease (CKD), stage V ([facility]), Congestive heart failure ([facility]), Dental disorder (12/29/2020), Diabetes, Hemodialysis patient ([facility]), Hypertension, Kidney failure, Myocardial infarct ([facility]), Osteoarthritis, Peripheral neuropathy, Pneumonia, and Sleep apnea.    Surgical History   has a past surgical history that includes appendectomy; other orthopedic surgery; other cardiac surgery; toe amputation (Left, 5/17/2020); tenotomy (Left, 5/17/2020); toe amputation (Right, 11/16/2020); and knee amputation below (Right, 12/31/2020).     Family History  family history includes Alcohol/Drug in his father; Diabetes in his brother; Heart Disease in his mother; Thyroid in his son.     Social History   reports that he quit smoking about 7 years ago. His smoking use included cigarettes. He has a 55.00 pack-year smoking history. He has never used smokeless tobacco. He reports previous alcohol use. He reports that he does not use drugs.    Allergies  Allergies   Allergen Reactions   • Mircera [Methoxy Polyethylene Glycol-Epoetin Beta] Hives   • Other Drug      \"Opiods\" caused  hallucinations       Medications  Prior to Admission Medications   Prescriptions Last Dose Informant Patient Reported? Taking?   acetaminophen (TYLENOL) 325 MG Tab   Yes No   Sig: Take 650 mg by mouth every four hours as needed.   apixaban (ELIQUIS) 2.5mg Tab   No No   Sig: Take 1 Tab by mouth 2 Times a Day.   atorvastatin (LIPITOR) 40 MG Tab  Patient No No   Sig: Take 1 Tab by mouth every bedtime.   Patient not taking: Reported on 12/29/2020   calcitRIOL (ROCALTROL) 0.25 MCG Cap  Patient Yes No   Sig: Take 0.25 mcg by mouth every day.   gabapentin (NEURONTIN) 300 MG Cap   No No   Sig: Take 1 Cap by mouth " every day.   insulin glargine (LANTUS SOLOSTAR) 100 UNIT/ML Solution Pen-injector injection   No No   Sig: Inject 8 Units under the skin every morning.   methimazole (TAPAZOLE) 5 MG Tab   No No   Sig: TAKE 1.5 TABLETS BY MOUTH  IN THE MORNING AND 1 TABLET IN THE  EVENING   omeprazole (PRILOSEC) 20 MG delayed-release capsule   No No   Sig: Take 1 Cap by mouth 2 Times a Day.   tamsulosin (FLOMAX) 0.4 MG capsule   No No   Sig: Take 2 Caps by mouth every bedtime.   traZODone (DESYREL) 50 MG Tab   No No   Sig: Take 1 Tab by mouth at bedtime as needed.      Facility-Administered Medications: None       Physical Exam  Temp:  [36.9 °C (98.4 °F)] 36.9 °C (98.4 °F)  Pulse:  [] 91  Resp:  [13-20] 20  BP: (118-148)/() 146/55  SpO2:  [93 %-98 %] 94 %    Physical Exam  Vitals and nursing note reviewed.   Constitutional:       General: He is not in acute distress.     Appearance: Normal appearance. He is normal weight. He is not ill-appearing or toxic-appearing.   HENT:      Head: Normocephalic and atraumatic.      Nose: Nose normal. No congestion or rhinorrhea.      Mouth/Throat:      Mouth: Mucous membranes are moist.      Pharynx: Oropharynx is clear.   Eyes:      Extraocular Movements: Extraocular movements intact.      Conjunctiva/sclera: Conjunctivae normal.      Pupils: Pupils are equal, round, and reactive to light.   Neck:      Vascular: No carotid bruit.   Cardiovascular:      Rate and Rhythm: Tachycardia present. Rhythm irregular.      Pulses: Normal pulses.      Heart sounds: Normal heart sounds. No murmur. No gallop.    Pulmonary:      Effort: No respiratory distress.      Breath sounds: Rales present. No wheezing.      Comments: Coarse crackles in the bases bilaterally clear with deep breathing  Abdominal:      General: Abdomen is flat. Bowel sounds are normal. There is no distension.      Palpations: Abdomen is soft. There is no mass.      Tenderness: There is no abdominal tenderness. There is no  guarding or rebound.      Hernia: No hernia is present.   Musculoskeletal:         General: No tenderness or signs of injury.      Cervical back: Normal range of motion and neck supple. No muscular tenderness.      Right lower leg: No edema.      Left lower leg: No edema.      Comments: Healed right BKA     Lymphadenopathy:      Cervical: No cervical adenopathy.   Skin:     Capillary Refill: Capillary refill takes less than 2 seconds.      Coloration: Skin is not jaundiced or pale.      Findings: No bruising.   Neurological:      General: No focal deficit present.      Mental Status: He is alert and oriented to person, place, and time. Mental status is at baseline.      Cranial Nerves: No cranial nerve deficit.      Motor: No weakness.      Coordination: Coordination normal.   Psychiatric:         Mood and Affect: Mood normal.         Thought Content: Thought content normal.         Judgment: Judgment normal.         Laboratory:  Recent Labs     05/10/21  2119   WBC 8.3   RBC 2.96*   HEMOGLOBIN 9.9*   HEMATOCRIT 30.6*   .4*   MCH 33.4*   MCHC 32.4*   RDW 57.7*   PLATELETCT 249   MPV 9.5     Recent Labs     05/10/21  2119   SODIUM 127*   POTASSIUM 5.5   CHLORIDE 88*   CO2 26   GLUCOSE 129*   BUN 42*   CREATININE 6.41*   CALCIUM 11.3*     Recent Labs     05/10/21  2119   ALTSGPT 8   ASTSGOT 12   ALKPHOSPHAT 119*   TBILIRUBIN 0.4   GLUCOSE 129*         Recent Labs     05/10/21  2119   NTPROBNP >15643*         Recent Labs     05/10/21  2119   TROPONINT 657*       Imaging:  DX-CHEST-PORTABLE (1 VIEW)   Final Result         1.  Left lung base infiltrate or atelectasis with small left pleural effusion   2.  Cardiomegaly   3.  Perihilar interstitial prominence and bronchial wall cuffing, appearance suggests changes of underlying bronchial inflammation, consider bronchitis.            Assessment/Plan:  I anticipate this patient will require at least two midnights for appropriate medical management, necessitating  inpatient admission.    Shortness of breath  Assessment & Plan  Multifactorial secondary to high-output heart failure with A. fib with RVR, anemia and possible non-ST elevation MI.  Supplemental oxygen, rate control  Follow-up anemia work-up  Follow-up cardiology consult      End stage renal disease on dialysis (HCC)- (present on admission)  Assessment & Plan  Nonoliguric, appears euvolemic without metabolic derangement justifying emergent hemodialysis  Obtain nephrology consult for scheduled Monday Wednesday Friday hemodialysis in a.m.    Anemia- (present on admission)  Assessment & Plan  Complicated by end-stage renal failure on dialysis.    Follow-up anemia labs    Hyperthyroidism- (present on admission)  Assessment & Plan  Follow-up TSH continue Tapazole    Hyperkalemia  Assessment & Plan  Without peak T waves, dialysis scheduled in a.m.    Type 1 diabetes mellitus ([facility])  Assessment & Plan  Regular insulin sliding scale, follow-up A1c    Atrial fibrillation with RVR ([facility])  Assessment & Plan  Recent discontinuation of Coreg, trial Lopressor  Follow-up cardiology consult Dr. Amin

## 2021-05-11 NOTE — PROGRESS NOTES
[facility] Medicine Daily Progress Note    Date of Service  5/11/2021    Chief Complaint  75 y.o. male admitted 5/10/2021 with   Chief Complaint   Patient presents with   • Shortness of Breath     Pt reports incureased use of accessory muscles, O2 at home >90%.         Hospital Course  75 y.o. male with history of nonoliguric renal failure on hemodialysis, A. fib on eliquis, and diabetes with peripheral vascular disease who presented 5/10/2021 for evaluation of 4 days of shortness of breath and cough described as trouble clearing his throat but ultimately nonproductive.  He admits a 5-minute episode of dull left-sided nonradiating 3-5 intensity chest pain which resolved spontaneously without intervention it was associated and palpitations with nausea or vomiting.  Work-up reveals BNP greater than 30,000 and a troponin of 657 from a baseline of mid 100s and an EKG with atrial fibrillation with RVR and PVCs but no acute ischemic changes and unchanged from previous.  Trop 657>552  Cardiology consulted, suspecting related to primary pulmonary condition, bronchitis vs. Pulmonary edema. Echo pending  Nephrology consulted for HD  Get procal, blood culture  CXR personally reviewed: Left lung base infiltrate or atelectasis with small left pleural effusion; Perihilar interstitial prominence and bronchial wall cuffing, appearance suggests changes of underlying bronchial inflammation, consider bronchitis.    Interval Problem Update  Patient was seen and examined at the bedside. No overnight events reported.  On RA, reports sob improving. Nephrology consulted for HD.  All Data, Medication data reviewed.  Case discussed with nursing, CM,  nurse, pharmacy in IDT rounds.   Denies fever, chills, chest pain, shortness breath, nausea, vomiting, abdominal pain  Will check procal, blood culture  PT/OT    Consultants/Specialty  Cardiology  nephrology    Code Status  Full Code    Disposition  TBD    Review of Systems  Review of  Systems   Constitutional: Negative for chills and fever.   HENT: Negative for ear discharge.    Eyes: Negative for blurred vision.   Respiratory: Positive for cough and shortness of breath. Negative for hemoptysis.    Cardiovascular: Positive for chest pain. Negative for palpitations.   Gastrointestinal: Negative for nausea and vomiting.   Genitourinary: Negative for dysuria.   Musculoskeletal: Negative for myalgias.   Skin: Negative for rash.   Neurological: Negative for focal weakness.   Endo/Heme/Allergies: Does not bruise/bleed easily.   Psychiatric/Behavioral: Negative for depression.   All other systems reviewed and are negative.       Physical Exam  Temp:  [36.1 °C (97 °F)-36.9 °C (98.4 °F)] 36.1 °C (97 °F)  Pulse:  [] 87  Resp:  [13-20] 16  BP: (118-148)/() 125/84  SpO2:  [92 %-100 %] 92 %    Physical Exam  Vitals and nursing note reviewed.   Constitutional:       General: He is not in acute distress.     Appearance: Normal appearance.   HENT:      Head: Normocephalic and atraumatic.      Mouth/Throat:      Mouth: Mucous membranes are dry.      Pharynx: Oropharynx is clear.   Eyes:      Pupils: Pupils are equal, round, and reactive to light.   Cardiovascular:      Rate and Rhythm: Normal rate. Rhythm irregular.   Pulmonary:      Effort: Pulmonary effort is normal. No respiratory distress.      Breath sounds: Normal breath sounds. No wheezing.   Abdominal:      General: Abdomen is flat. Bowel sounds are normal.      Palpations: Abdomen is soft.   Musculoskeletal:         General: No swelling or tenderness. Normal range of motion.      Comments: R BKA, L great toe amputation   Fistula to the L upper ext   Skin:     General: Skin is warm and dry.   Neurological:      General: No focal deficit present.      Mental Status: He is alert and oriented to person, place, and time.   Psychiatric:         Mood and Affect: Mood normal.         Behavior: Behavior normal.         Fluids    Intake/Output Summary  (Last 24 hours) at 5/11/2021 1303  Last data filed at 5/11/2021 0600  Gross per 24 hour   Intake 120 ml   Output --   Net 120 ml       Laboratory  Recent Labs     05/10/21  2119 05/11/21  0630   WBC 8.3 6.5   RBC 2.96* 2.73*   HEMOGLOBIN 9.9* 9.2*   HEMATOCRIT 30.6* 27.3*   .4* 100.0*   MCH 33.4* 33.7*   MCHC 32.4* 33.7   RDW 57.7* 55.1*   PLATELETCT 249 215   MPV 9.5 9.7     Recent Labs     05/10/21  2119 05/11/21  0630   SODIUM 127* 135   POTASSIUM 5.5 5.8*   CHLORIDE 88* 95*   CO2 26 26   GLUCOSE 129* 124*   BUN 42* 47*   CREATININE 6.41* 6.77*   CALCIUM 11.3* 10.8*             Recent Labs     05/11/21  0304   TRIGLYCERIDE 156*   HDL 25*   *       Imaging  DX-CHEST-PORTABLE (1 VIEW)   Final Result         1.  Left lung base infiltrate or atelectasis with small left pleural effusion   2.  Cardiomegaly   3.  Perihilar interstitial prominence and bronchial wall cuffing, appearance suggests changes of underlying bronchial inflammation, consider bronchitis.      EC-ECHOCARDIOGRAM COMPLETE W/O CONT    (Results Pending)        Assessment/Plan  Shortness of breath  Assessment & Plan  Multifactorial secondary to high-output heart failure with A. fib with RVR, pulmonary edema vs. Bronchitis  ProBNP >26911  CXR personally reviewed: Left lung base infiltrate or atelectasis with small left pleural effusion; Perihilar interstitial prominence and bronchial wall cuffing, appearance suggests changes of underlying bronchial inflammation, consider bronchitis.  On RA  Check procalcitonin, blood culture  Nephrology for dialysis  Echo  Cardiology consulted, rec appreciated    End stage renal disease on dialysis (HCC)- (present on admission)  Assessment & Plan  Nephrology consulted to resume HD, rec appreciated    Type 2 diabetes mellitus with kidney complication, with long-term current use of insulin (HCC)- (present on admission)  Assessment & Plan  Regular insulin sliding scale, follow-up A1c 5.7  On insulin  glargine  accucheck with hypoglycemic protocol    Anemia- (present on admission)  Assessment & Plan  Complicated by end-stage renal failure on dialysis.   No sign of bleeding  Transfuse if Hb<7   Follow-up anemia labs    Hyperthyroidism- (present on admission)  Assessment & Plan  Follow-up TSH continue Tapazole    Amputated great toe of left foot (HCC)  Assessment & Plan  Hx of     Hyperkalemia  Assessment & Plan  Without peak T waves, dialysis scheduled in a.m.    Atrial fibrillation with RVR ([facility])  Assessment & Plan  Cont eliquis and coreg  Rate controlled 5/11    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)- (present on admission)  Assessment & Plan  Hx of   Sec to PAD and diabetic ulcer    Dyslipidemia- (present on admission)  Assessment & Plan  Cont statin       VTE prophylaxis: eliquis

## 2021-05-11 NOTE — ASSESSMENT & PLAN NOTE
Multifactorial secondary to fluid overload from ESRD inadequate dialysis vs. CHF  ProBNP >10161  CXR personally reviewed: Left lung base infiltrate or atelectasis with small left pleural effusion; Perihilar interstitial prominence and bronchial wall cuffing, appearance suggests changes of underlying bronchial inflammation, consider bronchitis.  Echo: EF 30%, global hypokinesis  S/p [facility] 5/13

## 2021-05-11 NOTE — ASSESSMENT & PLAN NOTE
Complicated by end-stage renal failure on dialysis.   No sign of bleeding  Transfuse if Hb<7   Follow-up anemia labs, c/w chronic disease anemia

## 2021-05-11 NOTE — ASSESSMENT & PLAN NOTE
Without peak T waves, dialysis TTS  Nephrology following   [Cafe au lait] : cafe au lait [Negative] : Allergic/Immunologic

## 2021-05-11 NOTE — PROGRESS NOTES
Patient arrived to unit via gurney. Pivot to bed with stand-by assist. Assumed pt care. A/Ox4, VSS, pt is on RA. No complaints or pain at this time. Pt oriented to unit and call light system. POC reviewed and white board updated. Tele box on. Monitor room notified, Afib at 98. Safety precautions in place. Call light in reach. Bed locked in lowest position with upper bed rails up. Pt educated to call before getting out of bed. Verbalized understanding.

## 2021-05-11 NOTE — CONSULTS
Cardiology Consult Note:    Tito Amin M.D.  Date & Time note created:    5/11/2021   12:04 AM     Referring MD:  Dr. Lang    Patient ID:   Name:             Luis Sepulveda   YOB: 1945  Age:                 75 y.o.  male   MRN:               [id number]                                                             Reason for Consult:      SOB    History of Present Illness:    75-year-old male with a past medical history of atrial fibrillation and end-stage renal disease on dialysis.  Of note he dialyzes Tuesday Thursday Saturday.  Last dialysis was supposed to be 2-day or on Tuesday.  He said he has been losing a lot of muscle mass.  His dry weight is not being found and he thinks that he is been putting on fluid.  Is been having a couple of weeks of mucus production in his throat followed by some shortness of breath in the last couple weeks some PND.  Because of this he came into the ER for evaluation.  In the ER he was found to have a small left-sided pleural effusion with cardiomegaly.  He has not had some an echocardiogram in some time.  He denies chest pain or pressure with exertion.    Review of Systems:      Constitutional: Denies fevers, Denies weight changes  Eyes: Denies changes in vision, no eye pain  Ears/Nose/Throat/Mouth: Denies nasal congestion or sore throat   Cardiovascular: no chest pain, no palpitations   Respiratory: + shortness of breath , Denies cough  Gastrointestinal/Hepatic: Denies abdominal pain, nausea, vomiting, diarrhea, constipation or GI bleeding   Genitourinary: Denies dysuria or frequency  Musculoskeletal/Rheum: Denies  joint pain and swelling, no edema  Skin: Denies rash  Neurological: Denies headache, confusion, memory loss or focal weakness/parasthesias  Psychiatric: denies mood disorder   Endocrine: Agueda thyroid problems  Heme/Oncology/Lymph Nodes: Denies enlarged lymph nodes, denies brusing or known bleeding disorder  All other systems were  reviewed and are negative (AMA/CMS criteria)                Past Medical History:   Past Medical History:   Diagnosis Date   • Arrhythmia     hx a fib   • Arthritis 12/29/2020    knees, ankles, back   • CAD (coronary artery disease)     stents   • Chronic anticoagulation 3/26/2020   • Chronic kidney disease (CKD), stage V (HCC)    • Congestive heart failure (HCC)     hx of   • Dental disorder 12/29/2020    partial upper   • Diabetes    • Hemodialysis patient (HCC)     Tuesday, Thursday, Saturday   • Hypertension    • Kidney failure    • Myocardial infarct (HCC)     ? 2019    • Osteoarthritis    • Peripheral neuropathy    • Pneumonia     per hx   • Sleep apnea     does not use cpap anymore     There are no active hospital problems to display for this patient.      Past Surgical History:  Past Surgical History:   Procedure Laterality Date   • KNEE AMPUTATION BELOW Right 12/31/2020    Procedure: AMPUTATION, BELOW KNEE ...;  Surgeon: Leobardo Lynn M.D.;  Location: [facility];  Service: Orthopedics   • TOE AMPUTATION Right 11/16/2020    Procedure: AMPUTATION, TOE GREAT;  Surgeon: Leobardo Lynn M.D.;  Location: [facility];  Service: Orthopedics   • TOE AMPUTATION Left 5/17/2020    Procedure: AMPUTATION, TOE GREAT;  Surgeon: Leobardo Lynn M.D.;  Location: [facility];  Service: Orthopedics   • TENOTOMY Left 5/17/2020    Procedure: FLEXOR TENOTOMIES, TWO-FIVE TOES;  Surgeon: Leobardo Lynn M.D.;  Location: [facility];  Service: Orthopedics   • APPENDECTOMY     • OTHER CARDIAC SURGERY      stents x1   • OTHER ORTHOPEDIC SURGERY      knee surgery       Hospital Medications:  Current Facility-Administered Medications   Medication Dose   • acetaminophen (Tylenol) tablet 650 mg  650 mg   • atorvastatin (LIPITOR) tablet 40 mg  40 mg   • calcitRIOL (ROCALTROL) capsule 0.25 mcg  0.25 mcg   • gabapentin (NEURONTIN) capsule 300 mg  300 mg   • insulin glargine (Semglee) injection   "8 Units   • methimazole (TAPAZOLE) tablet 5 mg  5 mg   • tamsulosin (FLOMAX) capsule 0.8 mg  0.8 mg   • senna-docusate (PERICOLACE or SENOKOT S) 8.6-50 MG per tablet 2 tablet  2 tablet    And   • polyethylene glycol/lytes (MIRALAX) PACKET 1 Packet  1 Packet    And   • magnesium hydroxide (MILK OF MAGNESIA) suspension 30 mL  30 mL    And   • bisacodyl (DULCOLAX) suppository 10 mg  10 mg   • heparin injection 5,000 Units  5,000 Units   • morphine (pf) 4 mg/mL injection 2-4 mg  2-4 mg   • nitroglycerin (NITROSTAT) tablet 0.4 mg  0.4 mg   • Metoprolol Tartrate (LOPRESSOR) injection 5 mg  5 mg   • insulin regular (HumuLIN R,NovoLIN R) injection  1-6 Units    And   • glucose 4 g chewable tablet 16 g  16 g    And   • dextrose 50% (D50W) injection 50 mL  50 mL   • carvedilol (COREG) tablet 3.125 mg  3.125 mg   • Metoprolol Tartrate (LOPRESSOR) injection 5 mg  5 mg     Current Outpatient Medications   Medication   • insulin glargine (LANTUS SOLOSTAR) 100 UNIT/ML Solution Pen-injector injection   • gabapentin (NEURONTIN) 300 MG Cap   • omeprazole (PRILOSEC) 20 MG delayed-release capsule   • tamsulosin (FLOMAX) 0.4 MG capsule   • traZODone (DESYREL) 50 MG Tab   • methimazole (TAPAZOLE) 5 MG Tab   • apixaban (ELIQUIS) 2.5mg Tab   • calcitRIOL (ROCALTROL) 0.25 MCG Cap   • atorvastatin (LIPITOR) 40 MG Tab         Current Outpatient Medications:  (Not in a hospital admission)      Medication Allergy:  Allergies   Allergen Reactions   • Mircera [Methoxy Polyethylene Glycol-Epoetin Beta] Hives   • Other Drug      \"Opiods\" caused  hallucinations       Family History:  Family History   Problem Relation Age of Onset   • Heart Disease Mother    • Alcohol/Drug Father    • Thyroid Son    • Diabetes Brother    • Hypertension Neg Hx    • Hyperlipidemia Neg Hx        Social History:  Social History     Socioeconomic History   • Marital status:      Spouse name: Not on file   • Number of children: Not on file   • Years of education: " "Not on file   • Highest education level: Some college, no degree   Occupational History   • Not on file   Tobacco Use   • Smoking status: Former Smoker     Packs/day: 1.00     Years: 55.00     Pack years: 55.00     Types: Cigarettes     Quit date: 2014     Years since quittin.3   • Smokeless tobacco: Never Used   Substance and Sexual Activity   • Alcohol use: Not Currently   • Drug use: No   • Sexual activity: Not on file   Other Topics Concern   • Not on file   Social History Narrative   • Not on file     Social Determinants of Health     Financial Resource Strain:    • Difficulty of Paying Living Expenses:    Food Insecurity:    • Worried About Running Out of Food in the Last Year:    • Ran Out of Food in the Last Year:    Transportation Needs: Unmet Transportation Needs   • Lack of Transportation (Medical): Yes   • Lack of Transportation (Non-Medical): Yes   Physical Activity: Inactive   • Days of Exercise per Week: 0 days   • Minutes of Exercise per Session: 0 min   Stress: Stress Concern Present   • Feeling of Stress : To some extent   Social Connections: Moderately Isolated   • Frequency of Communication with Friends and Family: More than three times a week   • Frequency of Social Gatherings with Friends and Family: More than three times a week   • Attends Sikhism Services: Never   • Active Member of Clubs or Organizations: No   • Attends Club or Organization Meetings: Never   • Marital Status:    Intimate Partner Violence:    • Fear of Current or Ex-Partner:    • Emotionally Abused:    • Physically Abused:    • Sexually Abused:          Physical Exam:  Vitals/ General Appearance:   Weight/BMI: Body mass index is 26.95 kg/m².  /59   Pulse 93   Temp 36.9 °C (98.4 °F) (Temporal)   Resp 16   Ht 1.676 m (5' 6\")   Wt 75.8 kg (167 lb)   SpO2 97%   Vitals:    05/10/21 2053 05/10/21 2119 05/10/21 2226 05/10/21 2229   BP:   137/59    Pulse: (!) 102 99  93   Resp: 15 20  16   Temp:     "   TempSrc:       SpO2: 96% 98%  97%   Weight:       Height:         Oxygen Therapy:  Pulse Oximetry: 97 %    Constitutional:   Well developed, Well nourished, No acute distress  HENMT:  Normocephalic, Atraumatic, Oropharynx moist mucous membranes, No oral exudates, Nose normal.  No thyromegaly.  Eyes:  EOMI, Conjunctiva normal, No discharge.  Neck:  Normal range of motion, No cervical tenderness,  no JVD.  Cardiovascular:  Normal heart rate, Normal rhythm, No murmurs, No rubs, No gallops.   Extremitites with intact distal pulses, no cyanosis, or edema.  Lungs:  Normal breath sounds, breath sounds clear to auscultation bilaterally,  no crackles, no wheezing.   Abdomen: Bowel sounds normal, Soft, No tenderness, No guarding, No rebound, No masses, No hepatosplenomegaly.  Skin: Warm, Dry, No erythema, No rash, no induration.  Neurologic: Alert & oriented x 3, No focal deficits noted, cranial nerves II through X are grossly intact.  Psychiatric: Affect normal, Judgment normal, Mood normal.      MDM (Data Review):     Records reviewed and summarized in current documentation    Lab Data Review:  Recent Results (from the past 24 hour(s))   EKG    Collection Time: 05/10/21  8:47 PM   Result Value Ref Range    Report       [facility] Emergency Dept.    Test Date:  2021-05-10  Pt Name:    KLARISSA BRADSHAW              Department: ER  MRN:        [id number]                      Room:  Gender:     Male                         Technician: [id number]  :        1945                   Requested By:ER TRIAGE PROTOCOL  Order #:    [id number]                    Reading MD: MILADYS AMBROSIO MD    Measurements  Intervals                                Axis  Rate:       96                           P:  ID:                                      QRS:        -67  QRSD:       140                          T:          121  QT:         400  QTc:        506    Interpretive Statements  ATRIAL FIBRILLATION, V-RATE    PAIRED  VENTRICULAR PREMATURE COMPLEXES  RBBB AND LAFB  Compared to ECG 01/09/2021 18:41:56  Sinus tachycardia no longer present  Myocardial infarct finding no longer present  Electronically Signed On 5- 22:35:07 PDT by MILADYS AMBROSIO MD     CBC with Differential    Collection Time: 05/10/21  9:19 PM   Result Value Ref Range    WBC 8.3 4.8 - 10.8 K/uL    RBC 2.96 (L) 4.70 - 6.10 M/uL    Hemoglobin 9.9 (L) 14.0 - 18.0 g/dL    Hematocrit 30.6 (L) 42.0 - 52.0 %    .4 (H) 81.4 - 97.8 fL    MCH 33.4 (H) 27.0 - 33.0 pg    MCHC 32.4 (L) 33.7 - 35.3 g/dL    RDW 57.7 (H) 35.9 - 50.0 fL    Platelet Count 249 164 - 446 K/uL    MPV 9.5 9.0 - 12.9 fL    Neutrophils-Polys 69.30 44.00 - 72.00 %    Lymphocytes 19.60 (L) 22.00 - 41.00 %    Monocytes 8.90 0.00 - 13.40 %    Eosinophils 1.10 0.00 - 6.90 %    Basophils 0.50 0.00 - 1.80 %    Immature Granulocytes 0.60 0.00 - 0.90 %    Nucleated RBC 0.00 /100 WBC    Neutrophils (Absolute) 5.75 1.82 - 7.42 K/uL    Lymphs (Absolute) 1.63 1.00 - 4.80 K/uL    Monos (Absolute) 0.74 0.00 - 0.85 K/uL    Eos (Absolute) 0.09 0.00 - 0.51 K/uL    Baso (Absolute) 0.04 0.00 - 0.12 K/uL    Immature Granulocytes (abs) 0.05 0.00 - 0.11 K/uL    NRBC (Absolute) 0.00 K/uL   Comp Metabolic Panel    Collection Time: 05/10/21  9:19 PM   Result Value Ref Range    Sodium 127 (L) 135 - 145 mmol/L    Potassium 5.5 3.6 - 5.5 mmol/L    Chloride 88 (L) 96 - 112 mmol/L    Co2 26 20 - 33 mmol/L    Anion Gap 13.0 7.0 - 16.0    Glucose 129 (H) 65 - 99 mg/dL    Bun 42 (H) 8 - 22 mg/dL    Creatinine 6.41 (HH) 0.50 - 1.40 mg/dL    Calcium 11.3 (H) 8.5 - 10.5 mg/dL    AST(SGOT) 12 12 - 45 U/L    ALT(SGPT) 8 2 - 50 U/L    Alkaline Phosphatase 119 (H) 30 - 99 U/L    Total Bilirubin 0.4 0.1 - 1.5 mg/dL    Albumin 3.8 3.2 - 4.9 g/dL    Total Protein 7.0 6.0 - 8.2 g/dL    Globulin 3.2 1.9 - 3.5 g/dL    A-G Ratio 1.2 g/dL   proBrain Natriuretic Peptide, NT    Collection Time: 05/10/21  9:19 PM   Result Value Ref Range     NT-proBNP >39172 (H) 0 - 125 pg/mL   TROPONIN    Collection Time: 05/10/21  9:19 PM   Result Value Ref Range    Troponin T 657 (H) 6 - 19 ng/L   ESTIMATED GFR    Collection Time: 05/10/21  9:19 PM   Result Value Ref Range    GFR If African American 10 (A) >60 mL/min/1.73 m 2    GFR If Non  8 (A) >60 mL/min/1.73 m 2   COV-2, FLU A/B, AND RSV BY PCR (2-4 HOURS CEPHEID): Collect NP swab in VTM    Collection Time: 05/10/21  9:37 PM    Specimen: Respirate   Result Value Ref Range    Influenza virus A RNA Negative Negative    Influenza virus B, PCR Negative Negative    RSV, PCR Negative Negative    SARS-CoV-2 by PCR NotDetected     SARS-CoV-2 Source NP Swab        Imaging/Procedures Review:    Chest Xray:  Reviewed    EKG:   Dated 5/10/2021 personally viewed interpreted myself showing normal sinus rhythm with with a right bundle branch block and occasional PVCs.      ECHO:  Dated 11/14/2020 personally viewed interpret myself showing EF 55% with RVSP of 40.    MDM (Assessment and Plan):     There are no active hospital problems to display for this patient.    75-year-old male with exertional shortness of breath and PND with an elevated BNP and fluid infiltration on his chest x-ray.  I think that he simply fluid overloaded from lack of adequate dialysis and he is probably losing muscle mass making his dry weight lower.  I would try reaching a lower weight for fluid removal.  We will repeat an echocardiogram in the morning to see that it is normal.  At this point he does not carry the diagnosis of heart failure given his end-stage renal disease and normal ejection fraction.  However this may change if his EF is abnormal but for now I would defer the diagnosis of heart failure.

## 2021-05-11 NOTE — DISCHARGE PLANNING
Outpatient Dialysis Note    Confirmed patient is active at:    [facility]   [address]  [place] [address]    Schedule: Tuesday, Thursday, Saturday   Time: 05:40am     Patient is seen by Dr. Hunter in HD clinic.    Spoke with Clayton at facility who confirmed.    Forwarded records for review.    Becca Bojorquez- Dialysis Coordinator # [phone]  [facility]

## 2021-05-11 NOTE — ASSESSMENT & PLAN NOTE
Regular insulin sliding scale, follow-up A1c 5.7  On insulin glargine  accucheck with hypoglycemic protocol

## 2021-05-11 NOTE — ED PROVIDER NOTES
ED Provider Note    Scribed for Dr. Patrice Stein M.D. by Brent Wilhelm. 5/10/2021  8:39 PM    Primary care provider: Patito Edgar M.D.  Means of arrival: Walk-in  History obtained from: Patient  History limited by: None    CHIEF COMPLAINT  Chief Complaint   Patient presents with    Shortness of Breath     Pt reports incureased use of accessory muscles, O2 at home >90%.         HPI  Luis Sepulveda is a 75 y.o. male who presents to the Emergency Department for evaluation of worsening shortness of breath onset approximately 3-4 days ago. The patient reports that he chronically has mucous present in his airways which he typically alleviates with Mucinex. However, the patient reports that approximately 3-4 days ago, Mucinex was not controlling the presence of mucous in his airway. This caused the patient to began experiencing shortness of breath which has gradually worsened, prompting him to visit the ED. At this time, the patient endorses a cough with intermittent sputum production which is white and thick in quality along with intermittent left-sided chest pain. The patient denies any associated lightheadedness, and no alleviating or exacerbating factors were identified for the patient's cough. He denies any sick contacts at home or any exposure to COVID-19. The patient also reports that he completes dialysis sessions regularly and is concerned about staying in the ED as he does not want to miss his session tomorrow.     PPE Note: I personally donned PPE for all patient encounters during this visit, including wearing a mask. Scribe remained outside the patient's room and did not have any contact with the patient for the duration of patient encounter.     REVIEW OF SYSTEMS  Pertinent positives include shortness of breath, left-sided chest pain, cough, sputum production. Pertinent negatives include no lightheadedness. As above, all other systems reviewed and are negative.   See HPI for further  details.     PAST MEDICAL HISTORY   has a past medical history of Arrhythmia, Arthritis (2020), CAD (coronary artery disease), Chronic anticoagulation (3/26/2020), Chronic kidney disease (CKD), stage V (HCC), Congestive heart failure (HCC), Dental disorder (2020), Diabetes, Hemodialysis patient (HCC), Hypertension, Kidney failure, Myocardial infarct (HCC), Osteoarthritis, Peripheral neuropathy, Pneumonia, and Sleep apnea.    SURGICAL HISTORY   has a past surgical history that includes appendectomy; other orthopedic surgery; other cardiac surgery; toe amputation (Left, 2020); tenotomy (Left, 2020); toe amputation (Right, 2020); and knee amputation below (Right, 2020).    SOCIAL HISTORY  Social History     Tobacco Use    Smoking status: Former Smoker     Packs/day: 1.00     Years: 55.00     Pack years: 55.00     Types: Cigarettes     Quit date: 2014     Years since quittin.2    Smokeless tobacco: Never Used   Substance Use Topics    Alcohol use: Not Currently    Drug use: No      Social History     Substance and Sexual Activity   Drug Use No       FAMILY HISTORY  Family History   Problem Relation Age of Onset    Heart Disease Mother     Alcohol/Drug Father     Thyroid Son     Diabetes Brother     Hypertension Neg Hx     Hyperlipidemia Neg Hx        CURRENT MEDICATIONS  Home Medications       Reviewed by Elvia Vivas R.N. (Registered Nurse) on 05/10/21 at 1554  Med List Status: Partial     Medication Last Dose Status   acetaminophen (TYLENOL) 325 MG Tab  Active   apixaban (ELIQUIS) 2.5mg Tab  Active   atorvastatin (LIPITOR) 40 MG Tab  Active   calcitRIOL (ROCALTROL) 0.25 MCG Cap  Active   gabapentin (NEURONTIN) 300 MG Cap  Active   insulin glargine (LANTUS SOLOSTAR) 100 UNIT/ML Solution Pen-injector injection  Active   methimazole (TAPAZOLE) 5 MG Tab  Active   omeprazole (PRILOSEC) 20 MG delayed-release capsule  Active   tamsulosin (FLOMAX) 0.4 MG capsule  Active   traZODone  "(DESYREL) 50 MG Tab  Active                    ALLERGIES  Allergies   Allergen Reactions    Mircera [Methoxy Polyethylene Glycol-Epoetin Beta] Hives    Other Drug      \"Opiods\" caused  hallucinations       PHYSICAL EXAM  VITAL SIGNS: /77   Pulse 64   Temp 36.9 °C (98.4 °F) (Temporal)   Resp 14   Ht 1.676 m (5' 6\")   Wt 75.8 kg (167 lb)   SpO2 93%   BMI 26.95 kg/m²     Constitutional: Well developed, Well nourished, no acute distress, Non-toxic appearance.   HENT: Normocephalic, Atraumatic, Bilateral external ears normal, Oropharynx moist, No oral exudates.   Eyes: PERRLA, EOMI, Conjunctiva normal, No discharge.   Neck: No tenderness, Supple, No stridor.   Lymphatic: No lymphadenopathy noted.   Cardiovascular: Normal heart rate,    Thorax & Lungs: Clear to auscultation bilaterally, No respiratory distress, No wheezing, No crackles.   Abdomen: Soft, No tenderness, No masses, No pulsatile masses.   Skin: Warm, Dry, No erythema, No rash.   Extremities: Healing surgical incision on the left arm. Below the knee amputation on the right lower extremity. No edema No cyanosis.   Musculoskeletal: No tenderness to palpation or major deformities noted.  Intact distal pulses  Neurologic: Awake, alert. Moves all extremities spontaneously.  Psychiatric: Affect normal, Judgment normal, Mood normal.       LABS  Results for orders placed or performed during the hospital encounter of 05/10/21   CBC with Differential   Result Value Ref Range    WBC 8.3 4.8 - 10.8 K/uL    RBC 2.96 (L) 4.70 - 6.10 M/uL    Hemoglobin 9.9 (L) 14.0 - 18.0 g/dL    Hematocrit 30.6 (L) 42.0 - 52.0 %    .4 (H) 81.4 - 97.8 fL    MCH 33.4 (H) 27.0 - 33.0 pg    MCHC 32.4 (L) 33.7 - 35.3 g/dL    RDW 57.7 (H) 35.9 - 50.0 fL    Platelet Count 249 164 - 446 K/uL    MPV 9.5 9.0 - 12.9 fL    Neutrophils-Polys 69.30 44.00 - 72.00 %    Lymphocytes 19.60 (L) 22.00 - 41.00 %    Monocytes 8.90 0.00 - 13.40 %    Eosinophils 1.10 0.00 - 6.90 %    Basophils " 0.50 0.00 - 1.80 %    Immature Granulocytes 0.60 0.00 - 0.90 %    Nucleated RBC 0.00 /100 WBC    Neutrophils (Absolute) 5.75 1.82 - 7.42 K/uL    Lymphs (Absolute) 1.63 1.00 - 4.80 K/uL    Monos (Absolute) 0.74 0.00 - 0.85 K/uL    Eos (Absolute) 0.09 0.00 - 0.51 K/uL    Baso (Absolute) 0.04 0.00 - 0.12 K/uL    Immature Granulocytes (abs) 0.05 0.00 - 0.11 K/uL    NRBC (Absolute) 0.00 K/uL   Comp Metabolic Panel   Result Value Ref Range    Sodium 127 (L) 135 - 145 mmol/L    Potassium 5.5 3.6 - 5.5 mmol/L    Chloride 88 (L) 96 - 112 mmol/L    Co2 26 20 - 33 mmol/L    Anion Gap 13.0 7.0 - 16.0    Glucose 129 (H) 65 - 99 mg/dL    Bun 42 (H) 8 - 22 mg/dL    Creatinine 6.41 (HH) 0.50 - 1.40 mg/dL    Calcium 11.3 (H) 8.5 - 10.5 mg/dL    AST(SGOT) 12 12 - 45 U/L    ALT(SGPT) 8 2 - 50 U/L    Alkaline Phosphatase 119 (H) 30 - 99 U/L    Total Bilirubin 0.4 0.1 - 1.5 mg/dL    Albumin 3.8 3.2 - 4.9 g/dL    Total Protein 7.0 6.0 - 8.2 g/dL    Globulin 3.2 1.9 - 3.5 g/dL    A-G Ratio 1.2 g/dL   COV-2, FLU A/B, AND RSV BY PCR (2-4 HOURS CEPHEID): Collect NP swab in VTM    Specimen: Respirate   Result Value Ref Range    Influenza virus A RNA Negative Negative    Influenza virus B, PCR Negative Negative    RSV, PCR Negative Negative    SARS-CoV-2 by PCR NotDetected     SARS-CoV-2 Source NP Swab    proBrain Natriuretic Peptide, NT   Result Value Ref Range    NT-proBNP >24095 (H) 0 - 125 pg/mL   TROPONIN   Result Value Ref Range    Troponin T 657 (H) 6 - 19 ng/L   ESTIMATED GFR   Result Value Ref Range    GFR If African American 10 (A) >60 mL/min/1.73 m 2    GFR If Non  8 (A) >60 mL/min/1.73 m 2   EKG   Result Value Ref Range    Report       [facility] Emergency Dept.    Test Date:  2021-05-10  Pt Name:    KLARISSA BRADSHAW              Department: ER  MRN:        [id number]                      Room:  Gender:     Male                         Technician: [id number]  :        [id number]                    Requested By:ER TRIAGE PROTOCOL  Order #:    [id number]                    Reading MD: MILADYS AMBROSIO MD    Measurements  Intervals                                Axis  Rate:       96                           P:  OH:                                      QRS:        -67  QRSD:       140                          T:          121  QT:         400  QTc:        506    Interpretive Statements  ATRIAL FIBRILLATION, V-RATE    PAIRED VENTRICULAR PREMATURE COMPLEXES  RBBB AND LAFB  Compared to ECG 01/09/2021 18:41:56  Sinus tachycardia no longer present  Myocardial infarct finding no longer present  Electronically Signed On 5- 22:35:07 PDT by MILADYS AMBROSIO MD       All labs reviewed by me.    EKG  Interpreted by me as shown above.    RADIOLOGY  DX-CHEST-PORTABLE (1 VIEW)   Final Result         1.  Left lung base infiltrate or atelectasis with small left pleural effusion   2.  Cardiomegaly   3.  Perihilar interstitial prominence and bronchial wall cuffing, appearance suggests changes of underlying bronchial inflammation, consider bronchitis.        The radiologist's interpretation of all radiological studies have been reviewed by me.    COURSE & MEDICAL DECISION MAKING  Pertinent Labs & Imaging studies reviewed. (See chart for details)    8:39 PM - Patient seen and examined at bedside. Ordered DX-Chest, BNP, CBC Diff, CMP, and an EKG to evaluate his symptoms. The differential diagnoses include but are not limited to: Reactive airway disease, congestive heart failure, COVID-19    8:49 PM - Ordered COV-2, Flu A/B, and RSV    8:57 PM - Ordered troponin    10:27 PM - Patient was reevaluated at bedside. Discussed lab and radiology results with the patient and informed them that we admit the patient in order to complete further testing.The patient was agreeable and understanding to the plan for hospitalization, and thus we will contact the hospitalist.    10:48 PM - Paged hospitalist     11:00 PM - I discussed the  patient's case and the above findings with Dr. Lang (Hospitalist) who agrees to evaluate the patient for hospitalization.     Decision Making:  This is a 75-year-old male who presented with an episode of shortness of breath this morning although he had somewhat of the onset of this 3 to 4 days ago, and he has had some chronic problems with shortness of breath and excessive mucus production for a long time he states today he had the shortness of breath associated with some left-sided chest pain lasting about 40 minutes he feels significantly better at the time of my evaluation he is not really feeling short of breath is not having any chest pain.  Her work-up it showed a significantly elevated troponin however he has an EKG that showing I believe frequent ventricular ectopy which is increased since previous tracings I think the underlying rhythm is still in sinus rhythm but it may be atrial fibrillation is difficult to ascertain.  There is not any EKGs to changes suggestive of MI however the significantly elevated troponin is a concern and I think needed is observation and follow-up repeat troponin.  Patient does have a little bit of pleural effusion on the left side and possibly little infiltrate.  This may require further investigation as well the patient is supposed to have hemodialysis tomorrow which will need to be done in hospital until think he has a indication for emergent hemodialysis tonight discussed with hospitalist as above    DISPOSITION:  Patient will be hospitalized by Dr. Xiao (Hospitalist) in guarded condition.    FINAL IMPRESSION  Non-STEMI  Dyspnea  Elevated troponin     Brent CHERRY ([facility]), am scribing for, and in the presence of, Patrice Stein M.D..    Electronically signed by: Brent Wilhelm ([facility]), 5/10/2021    IPatrice M.D. personally performed the services described in this documentation, as scribed by Brent Wilhelm in my presence, and it is both  accurate and complete.    C    The note accurately reflects work and decisions made by me.  Patrice Stein M.D.  5/11/2021  1:25 AM

## 2021-05-12 PROBLEM — Z71.89 ADVANCE CARE PLANNING: Status: ACTIVE | Noted: 2021-01-01

## 2021-05-12 NOTE — PROGRESS NOTES
[facility] -  CONSULTATION NOTE               Author: Nica Mayer M.D. Date & Time: 5/12/2021  3:47 PM       REASON FOR CONSULTATION:   Inpatient hemodialysis management.    CHIEF COMPLAINT:  Shortness of breath    HISTORY OF PRESENT ILLNESS:   75-year-old male with history of ESRD, on TTS HD, A. fib on Eliquis, diabetes mellitus, peripheral vascular disease presented with complaints of increasing shortness of breath along with occasional cough and mucus production for past couple of weeks.  No fever/chills.  Patient is compliant with hemodialysis sessions.  Diagnostic work-up significant for BNP in 35,000s, troponin of 657--> 552, EKG showing atrial fibrillation with RVR.  Chest x-ray: Small left pleural effusion and perihilar infiltrates.  K5.8 this AM.  Nephrology consulted for inpatient hemodialysis.     Interval updates :  5/12: Underwent HD yesterday.  Hypotensive this a.m with dizziness,. received 1 L fluid bolus.  Echo: LVEF 30%, global hypokinesis.  No chest pain..     REVIEW OF SYSTEMS:    General: No malaise, dizziness +  CV: No chest pain  RESP: shortness of breath improved, cough +  GI: No abdominal pain   : No dysuria or gross hematuria  MSK: No trauma  Skin: No rashes  Neuro: No tremors  Psych: No depression    PAST MEDICAL HISTORY:   Past Medical History:   Diagnosis Date   • Arrhythmia     hx a fib   • Arthritis 12/29/2020    knees, ankles, back   • CAD (coronary artery disease)     stents   • Chronic anticoagulation 3/26/2020   • Chronic kidney disease (CKD), stage V (HCC)    • Congestive heart failure (HCC)     hx of   • Dental disorder 12/29/2020    partial upper   • Diabetes    • Hemodialysis patient (HCC)     Tuesday, Thursday, Saturday   • Hypertension    • Kidney failure    • Myocardial infarct (HCC)     ? 2019    • Osteoarthritis    • Peripheral neuropathy    • Pneumonia     per hx   • Sleep apnea     does not use cpap anymore         PAST SURGICAL HISTORY:  "  Past Surgical History:   Procedure Laterality Date   • KNEE AMPUTATION BELOW Right 12/31/2020    Procedure: AMPUTATION, BELOW KNEE ...;  Surgeon: Leobardo Lynn M.D.;  Location: SURGERY [facility];  Service: Orthopedics   • TOE AMPUTATION Right 11/16/2020    Procedure: AMPUTATION, TOE GREAT;  Surgeon: Leobardo Lynn M.D.;  Location: SURGERY [facility];  Service: Orthopedics   • TOE AMPUTATION Left 5/17/2020    Procedure: AMPUTATION, TOE GREAT;  Surgeon: Leobardo Lynn M.D.;  Location: SURGERY [facility];  Service: Orthopedics   • TENOTOMY Left 5/17/2020    Procedure: FLEXOR TENOTOMIES, TWO-FIVE TOES;  Surgeon: Leobardo Lynn M.D.;  Location: SURGERY [facility];  Service: Orthopedics   • APPENDECTOMY     • OTHER CARDIAC SURGERY      stents x1   • OTHER ORTHOPEDIC SURGERY      knee surgery       FAMILY HISTORY:   No family history of kidney disease, mother has heart disease, brother -diabetes mellitus    SOCIAL HISTORY:   No smoking, no etoh, no illicit drugs.  Quit smoking 7 years ago, 55 pack years    HOME MEDICATIONS:   Reviewed and documented in chart    ALLERGIES:  Mircera [methoxy polyethylene glycol-epoetin beta] and Other drug    PHYSICAL EXAM:  VS:  /40   Pulse 83   Temp 36.4 °C (97.5 °F) (Temporal)   Resp 16   Ht 1.676 m (5' 6\")   Wt 76.1 kg (167 lb 12.3 oz)   SpO2 99%   BMI 27.08 kg/m²   GENERAL: no acute distress  CV: rhythm irregular, No edema  RESP: CTAB  GI: Soft  MSK: R BKA, L great toe amputation. Cyanotic left finger tips.  SKIN: No concerning rashes  NEURO: AOx3  PSYCH: Cooperative  ACCESS: LUE AVF    LABS:  Recent Results (from the past 24 hour(s))   POCT glucose device results    Collection Time: 05/11/21  4:37 PM   Result Value Ref Range    Glucose - Accu-Ck 92 65 - 99 mg/dL   PROCALCITONIN    Collection Time: 05/11/21  5:37 PM   Result Value Ref Range    Procalcitonin 0.13 <0.25 ng/mL   BLOOD CULTURE    Collection Time: 05/11/21  5:37 PM    Specimen: " Peripheral; Blood   Result Value Ref Range    Significant Indicator NEG     Source BLD     Site PERIPHERAL     Culture Result       No Growth  Note: Blood cultures are incubated for 5 days and  are monitored continuously.Positive blood cultures  are called to the RN and reported as soon as  they are identified.     TSH    Collection Time: 05/11/21  5:37 PM   Result Value Ref Range    TSH 2.140 0.380 - 5.330 uIU/mL   BLOOD CULTURE    Collection Time: 05/11/21  8:06 PM    Specimen: Peripheral; Blood   Result Value Ref Range    Significant Indicator NEG     Source BLD     Site PERIPHERAL     Culture Result       No Growth  Note: Blood cultures are incubated for 5 days and  are monitored continuously.Positive blood cultures  are called to the RN and reported as soon as  they are identified.     POCT glucose device results    Collection Time: 05/11/21  9:00 PM   Result Value Ref Range    Glucose - Accu-Ck 137 (H) 65 - 99 mg/dL   CBC WITH DIFFERENTIAL    Collection Time: 05/12/21  3:39 AM   Result Value Ref Range    WBC 7.5 4.8 - 10.8 K/uL    RBC 2.85 (L) 4.70 - 6.10 M/uL    Hemoglobin 9.5 (L) 14.0 - 18.0 g/dL    Hematocrit 29.3 (L) 42.0 - 52.0 %    .8 (H) 81.4 - 97.8 fL    MCH 33.3 (H) 27.0 - 33.0 pg    MCHC 32.4 (L) 33.7 - 35.3 g/dL    RDW 56.6 (H) 35.9 - 50.0 fL    Platelet Count 210 164 - 446 K/uL    MPV 9.6 9.0 - 12.9 fL    Neutrophils-Polys 65.30 44.00 - 72.00 %    Lymphocytes 20.00 (L) 22.00 - 41.00 %    Monocytes 13.20 0.00 - 13.40 %    Eosinophils 0.30 0.00 - 6.90 %    Basophils 0.70 0.00 - 1.80 %    Immature Granulocytes 0.50 0.00 - 0.90 %    Nucleated RBC 0.00 /100 WBC    Neutrophils (Absolute) 4.89 1.82 - 7.42 K/uL    Lymphs (Absolute) 1.50 1.00 - 4.80 K/uL    Monos (Absolute) 0.99 (H) 0.00 - 0.85 K/uL    Eos (Absolute) 0.02 0.00 - 0.51 K/uL    Baso (Absolute) 0.05 0.00 - 0.12 K/uL    Immature Granulocytes (abs) 0.04 0.00 - 0.11 K/uL    NRBC (Absolute) 0.00 K/uL   POCT glucose device results     Collection Time: 05/12/21  6:00 AM   Result Value Ref Range    Glucose - Accu-Ck 107 (H) 65 - 99 mg/dL   POCT glucose device results    Collection Time: 05/12/21  9:24 AM   Result Value Ref Range    Glucose - Accu-Ck 139 (H) 65 - 99 mg/dL   Basic Metabolic Panel    Collection Time: 05/12/21 10:07 AM   Result Value Ref Range    Sodium 136 135 - 145 mmol/L    Potassium 4.5 3.6 - 5.5 mmol/L    Chloride 97 96 - 112 mmol/L    Co2 28 20 - 33 mmol/L    Glucose 171 (H) 65 - 99 mg/dL    Bun 30 (H) 8 - 22 mg/dL    Creatinine 5.13 (HH) 0.50 - 1.40 mg/dL    Calcium 9.5 8.5 - 10.5 mg/dL    Anion Gap 11.0 7.0 - 16.0   ESTIMATED GFR    Collection Time: 05/12/21 10:07 AM   Result Value Ref Range    GFR If  13 (A) >60 mL/min/1.73 m 2    GFR If Non African American 11 (A) >60 mL/min/1.73 m 2   EC-ECHOCARDIOGRAM COMPLETE W/O CONT    Collection Time: 05/12/21 10:51 AM   Result Value Ref Range    Eject.Frac. MOD BP 35.63     Eject.Frac. MOD 4C 36.04     Eject.Frac. MOD 2C 34.85     Left Ventrical Ejection Fraction 30        (click the triangle to expand results)    IMAGING:  US-HEMODIALYSIS GRAFT DUPLEX COMP UPPER EXTREMITY         EC-ECHOCARDIOGRAM COMPLETE W/O CONT   Final Result      DX-CHEST-PORTABLE (1 VIEW)   Final Result         1.  Left lung base infiltrate or atelectasis with small left pleural effusion   2.  Cardiomegaly   3.  Perihilar interstitial prominence and bronchial wall cuffing, appearance suggests changes of underlying bronchial inflammation, consider bronchitis.      CL-LEFT HEART CATHETERIZATION WITH POSSIBLE INTERVENTION    (Results Pending)       ASSESSMENT:  # ESRD: normally T/T/S via. AVF.   # Hypotension  # Dyspnea  # Anemia in CKD  # Atrial fibrillation  # Diabetes mellitus  # Hyperthyroidism  # Hyperkalemia  # Elevated troponin    PLAN:  -continue T/T/S iHD during stay  -hypotension this a.m. received 1 L fluid bolus.  Echo: EF 30%, global hypokinesis.  Cardiology planning coronary angiogram  with new reduction in LVEF.  -Dose all meds for ESRD  -Renal Diet  -I/O, daily weights  -Epogen with HD  -Continue calcitriol, phos pending  -Continue appropriate home medications.    Thank you     Nica Mayer MD

## 2021-05-12 NOTE — CONSULTS
"Reason for PC Consult: Advance Care Planning    Consulted by: Dr. Lang; currently Dr. Villatoro    Assessment:  General: 76 y/o male from home with SOB/cough, elevated BNP >30k, and afib with RVR. Pt reports ongoing phlegm build-up in his throat which he feels makes it hard to \"get air.\" Cardiology consulted but believes the pt's presentation is secondary to ESRD. ECHO completed and pending interpretation. Pt suffered MI about 2 years ago and has been dialysis dependent since. He reports various complications with dialysis access and now has N/T in the left hand which is worrisome to him for fear of losing his dominant, left hand. PMHx of ESRD on HD at [facility] on [place] TTS, afib on Eliquis, DM, PVD s/p left BKA, hyperthyroidism, left toe amputations.     Social: Pt resides with his ex-wife [place] and son Miguel in [place]. He has a daughter Alivia in [place] and 5 grandkids. He has help from his ex-wife, son and grandson locally with transportation to/from HD, as well as basic needs at home. He recently got a prosthetic and learning to work with this to help with ADLs. He is a  and 80% service connected but does not receive any medical care from the [facility]    Consults: cardiology, nephrology    Dyspnea: No-    Last BM:  (pta)-    Pain: No-    Depression: Mood appropriate for situation-    Dementia: No;       Spiritual:  Is Yazidi or spirituality important for coping with this illness? No-    Has a  or spiritual provider visit been requested? No    Palliative Performance Scale: 60%    Advance Directive: None- he's currently working on a famiyl trust  DPOA: No-    POLST: No- completed on this encounter for DNR/selective focused treatment/trial TF no longer than 10 days    Code Status: Full- pt wishes for DNR/DNI    Outcome:  PC RN met with Adalid at bedside and explained the role of PC to help with discussions about the current medical situation and goals moving forward.. He was open to a visit and " "discussion. He provided this RN with his medical, family, and social history up to this point. He states that since the MI and subsequent renal failure, he's had a series of complications with his vascular access, needing surgeries, etc. He reports that he has good help from his family, but  \"they have lives too and can't always be there\" so there are times he struggles with ADLs. He notes that his daughter is planning to move back to [place] with her family after the school year and they collectively talked about building a home for the family which would be more accessible to Adalid with his wheelchair, etc. Adalid notes his ongoing difficulty with mucus build-up and the left hand N/T which has been ongoing. PC offered to share with MD as well.    Much discussion about his QoL, the things that bring him enjoyment and POC. He is very proud and prideful of his grandchildren and tells PC \"if you wonder why I go through all of this, it's because I'm excited to see where they get to.\" He acknowledged that the future is somewhat unknown and he realizes he may not have the opportunity to see some things, but this is his motivation to keep trying. PC notes the need to continue evaluating one's acceptance of their function to determine if it's doable or not and he agrees. He tells PC that he would love to get his prosthetic to work so he could play golf with his grandkids, ride a horse, or even fly a helicopter. He reports that getting to/from HD is not difficult, but sometimes tolerating it is. PC discussed AD/code status and he tells PC that he is working on a trust with the family and hopes to build a new family home. PC explained the NOK hierarchy for decision making and he expressed understanding, as well as states he's talked to his kids about some of his wishes too. He shared with PC that he wishes to be a DNR/DNI and this is something he has too shared with his children. PC explained the POLST and completed this with Adalid as " well demonstrating his wishes for DNR, selective focused treatment, and TF no longer than 10 days. Adalid reviewed and signed the form.     PC queried any supports/devices that could be of assistance at home, but there was nothing that came to mind. He tells PC that he's been working to try to get an increase in his VA compensation with the hopes of being able to receive aid and attendance. He currently is working with a local  group for this process. He denied any other needs/questions at this time. Plan made for this RN to f/u with the MD regarding his concerns and complete POLST. He was appreciative. While talking to Adalid, PC RN provided therapeutic communication, including open ended questions, reflective listening, and normalization of thought and feelings throughout encounter, as well as reinforced his goals of care.     Updates to MD on pt's concerns. POLST reviewed/signed and original returned to patient and placed in his belongings bag per his request.    Recommendations: I do not recommend an ethics or hospice consult at this time because pt is not interested at this time.    Updated: MD/MINERVA    Plan: follow and support- will revisit POC if ECHO demonstrates anything unexpected    Thank you for allowing Palliative Care to support this patient and family. Contact [phone] for additional assistance, change in patient status, or with any questions/concerns.

## 2021-05-12 NOTE — PROGRESS NOTES
Hospital Medicine Daily Progress Note    Date of Service  5/12/2021    Chief Complaint  75 y.o. male admitted 5/10/2021 with   Chief Complaint   Patient presents with   • Shortness of Breath     Pt reports incureased use of accessory muscles, O2 at home >90%.         Hospital Course  75 y.o. male with history of nonoliguric renal failure on hemodialysis, A. fib on eliquis, and diabetes with peripheral vascular disease who presented 5/10/2021 for evaluation of 4 days of shortness of breath and cough described as trouble clearing his throat but ultimately nonproductive.  He admits a 5-minute episode of dull left-sided nonradiating 3-5 intensity chest pain which resolved spontaneously without intervention it was associated and palpitations with nausea or vomiting.  Work-up reveals BNP greater than 30,000 and a troponin of 657 from a baseline of mid 100s and an EKG with atrial fibrillation with RVR and PVCs but no acute ischemic changes and unchanged from previous.  Trop 657>552  Cardiology consulted, suspecting related to primary pulmonary condition, bronchitis vs. Pulmonary edema. Echo pending  Nephrology consulted for HD  Get procal normal, blood culture neg to date  CXR personally reviewed: Left lung base infiltrate or atelectasis with small left pleural effusion; Perihilar interstitial prominence and bronchial wall cuffing, appearance suggests changes of underlying bronchial inflammation, consider bronchitis.    Rapid was called 5/12 due to symptomatic hypotension with Sbp 60-70. Report dizziness and drowsiness. NS 1L bolus given. Midodrine inc to 10mg tid.     PT/OT    Interval Problem Update  Rapid was called 5/12 due to symptomatic hypotension with Sbp 60-70. Report dizziness and drowsiness. NS 1L bolus given. Midodrine inc to 10mg tid. Cont tele monitoring  .  All Data, Medication data reviewed.  Case discussed with nursing, CM,  nurse, pharmacy in IDT rounds.      Consultants/Specialty  Cardiology  nephrology    Code Status  Full Code    Disposition  TBD    Review of Systems  Review of Systems   Constitutional: Negative for chills and fever.   HENT: Negative for ear discharge.    Eyes: Negative for blurred vision.   Respiratory: Positive for cough and shortness of breath. Negative for hemoptysis.    Cardiovascular: Positive for chest pain. Negative for palpitations.   Gastrointestinal: Negative for nausea and vomiting.   Genitourinary: Negative for dysuria.   Musculoskeletal: Negative for myalgias.   Skin: Negative for rash.   Neurological: Positive for dizziness and weakness. Negative for focal weakness.   Endo/Heme/Allergies: Does not bruise/bleed easily.   Psychiatric/Behavioral: Negative for depression.   All other systems reviewed and are negative.       Physical Exam  Temp:  [36.2 °C (97.1 °F)-36.6 °C (97.8 °F)] 36.3 °C (97.4 °F)  Pulse:  [] 85  Resp:  [16-18] 16  BP: ()/() 108/46  SpO2:  [90 %-100 %] 100 %    Physical Exam  Vitals and nursing note reviewed.   Constitutional:       General: He is not in acute distress.     Appearance: Normal appearance.   HENT:      Head: Normocephalic and atraumatic.      Mouth/Throat:      Mouth: Mucous membranes are dry.      Pharynx: Oropharynx is clear.   Eyes:      Pupils: Pupils are equal, round, and reactive to light.   Cardiovascular:      Rate and Rhythm: Normal rate. Rhythm irregular.   Pulmonary:      Effort: Pulmonary effort is normal. No respiratory distress.      Breath sounds: Normal breath sounds. No wheezing.   Abdominal:      General: Abdomen is flat. Bowel sounds are normal.      Palpations: Abdomen is soft.   Musculoskeletal:         General: No swelling or tenderness. Normal range of motion.      Comments: R BKA, L great toe amputation  Fistula to the L upper ext  L fingers blue, cool to touch   Skin:     General: Skin is warm and dry.   Neurological:      General: No focal deficit present.       Mental Status: He is alert and oriented to person, place, and time.   Psychiatric:         Mood and Affect: Mood normal.         Behavior: Behavior normal.         Fluids    Intake/Output Summary (Last 24 hours) at 5/12/2021 1100  Last data filed at 5/12/2021 0800  Gross per 24 hour   Intake 940 ml   Output 3000 ml   Net -2060 ml       Laboratory  Recent Labs     05/10/21  2119 05/11/21  0630 05/12/21  0339   WBC 8.3 6.5 7.5   RBC 2.96* 2.73* 2.85*   HEMOGLOBIN 9.9* 9.2* 9.5*   HEMATOCRIT 30.6* 27.3* 29.3*   .4* 100.0* 102.8*   MCH 33.4* 33.7* 33.3*   MCHC 32.4* 33.7 32.4*   RDW 57.7* 55.1* 56.6*   PLATELETCT 249 215 210   MPV 9.5 9.7 9.6     Recent Labs     05/10/21  2119 05/11/21  0630   SODIUM 127* 135   POTASSIUM 5.5 5.8*   CHLORIDE 88* 95*   CO2 26 26   GLUCOSE 129* 124*   BUN 42* 47*   CREATININE 6.41* 6.77*   CALCIUM 11.3* 10.8*             Recent Labs     05/11/21  0304   TRIGLYCERIDE 156*   HDL 25*   *       Imaging  EC-ECHOCARDIOGRAM COMPLETE W/O CONT         DX-CHEST-PORTABLE (1 VIEW)   Final Result         1.  Left lung base infiltrate or atelectasis with small left pleural effusion   2.  Cardiomegaly   3.  Perihilar interstitial prominence and bronchial wall cuffing, appearance suggests changes of underlying bronchial inflammation, consider bronchitis.           Assessment/Plan  * Shortness of breath  Assessment & Plan  Multifactorial secondary to pulmonary edema vs. Bronchitis vs. high-output heart failure with A. fib with RVR   ProBNP >30713  CXR personally reviewed: Left lung base infiltrate or atelectasis with small left pleural effusion; Perihilar interstitial prominence and bronchial wall cuffing, appearance suggests changes of underlying bronchial inflammation, consider bronchitis.    Check procalcitonin normal, blood culture neg to date  Nephrology for dialysis  Echo pending  Cardiology consulted, rec appreciated    Atrial fibrillation with RVR (HCC)  Assessment & Plan  Rate  controlled   Cont eliquis and coreg      Hypotension  Assessment & Plan  He had dialysis on 5/11 and reported 4L fluid has been removed. Suspecting hypotension sec to over dialysis  Symptomatic with dizziness and drowsiness  Start IVF bolus  Midodrine   Cont tele monitoring    End stage renal disease on dialysis (HCC)- (present on admission)  Assessment & Plan  On TTS schedule  Nephrology consulted and following    Type 2 diabetes mellitus with kidney complication, with long-term current use of insulin ([facility])- (present on admission)  Assessment & Plan  Regular insulin sliding scale, follow-up A1c 5.7  On insulin glargine  accucheck with hypoglycemic protocol    Advance care planning  Assessment & Plan  Code status discussed with palliative care team, appreciated  Patient requests DNR/DNI. POLST form signed    Amputated great toe of left foot ([facility])  Assessment & Plan  Hx of     Hyperkalemia  Assessment & Plan  Without peak T waves, dialysis TTS  Nephrology following    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)- (present on admission)  Assessment & Plan  Hx of   Sec to PAD and diabetic ulcer    Peripheral vascular disease ([facility])- (present on admission)  Assessment & Plan  L fingers bluish discoloration and tool to touch, reports numbing and tingling  Followed by vascular Dr. Honeycutt     Dyslipidemia- (present on admission)  Assessment & Plan  Cont statin    Anemia- (present on admission)  Assessment & Plan  Complicated by end-stage renal failure on dialysis.   No sign of bleeding  Transfuse if Hb<7   Follow-up anemia labs, c/w chronic disease anemia    Hyperthyroidism- (present on admission)  Assessment & Plan  Follow-up TSH normal  Continue Tapazole       VTE prophylaxis: eliquis    This patient is critically ill with a life threatening illness as noted above requiring my direct presence, involvement and maximal preparedness. I have personally spend 35 minutes providing critical care to this  patient. Time spend on critical care excludes time spend on procedures.

## 2021-05-12 NOTE — PROGRESS NOTES
3 1/2hr HD started @ 1206 and completed @ 1539,net UF =2300ml,goal not met due to low bp during tx.VSS post HD,asymptomatic.LUAAVF + B/T,cannulation sites covered with DD,CDI,report given to Yolanda Martines RN.

## 2021-05-12 NOTE — THERAPY
Missed Therapy Evaluation     Patient Name: Luis Sepulveda  Age:  75 y.o., Sex:  male  Medical Record #: [id number]  Today's Date: 5/12/2021    Discussed missed therapy with RN    PT order received, pt had rapid response this AM and RN asked to hold evaluation today. Will attempt tomorrow as able and appropriate

## 2021-05-12 NOTE — ASSESSMENT & PLAN NOTE
Code status discussed with palliative care team, appreciated  Patient requests DNR/DNI. POLST form signed

## 2021-05-12 NOTE — CODE DOCUMENTATION
Nephrologist at bedside, discussed acute hypotension, no new orders received at this time. No plans for dialysis today.

## 2021-05-12 NOTE — ASSESSMENT & PLAN NOTE
Echo: EF 30%, which has decreased from 55% in 11/2020  Extensive PAD: His BP reading on BUE may be not reliable. Check Bp at LLE for consistent reading, updated to RN  Midodrine   Cont tele monitoring  Avoid IV fluid administration unless patient is symptomatic (given hx of CHF and ESRD). On hold coreg

## 2021-05-12 NOTE — ASSESSMENT & PLAN NOTE
L fingers cyanosis and tool to touch, reports numbing and tingling  Followed by vascular Dr. Honeycutt. Dr. Honeycutt is consulted: ligation of L AVF? --patient did not want to proceed

## 2021-05-12 NOTE — THERAPY
Missed Therapy     Patient Name: Luis Sepulveda  Age:  75 y.o., Sex:  male  Medical Record #: [id number]  Today's Date: 5/12/2021    Discussed missed therapy with RN        05/12/21 2941   Initial Contact Note    Initial Contact Note Order Received and Verified, Occupational Therapy Evaluation in Progress with Full Report to Follow.   Interdisciplinary Plan of Care Collaboration   Collaboration Comments OT eval attempted, pt in rapid this am and remains w/low BP will follow up tomorrow   Session Information   Date / Session Number  5/12 HOLD

## 2021-05-12 NOTE — PROGRESS NOTES
[facility] Cardiology Follow-up Note    Date of Service:    5/12/2021      Name:   Luis Sepulveda   YOB: 1945  Age:   75 y.o.  male   MRN:   [id number]      Chief Complaint: shortness of breath     Primary cardiologist:  Dr. Osborn    HPI:  Mr Sepulveda is a 76 y/o fellow with PAF on low dose Eliquis, CAD, ESRD on HD T, Th, Sa who presented to [facility] on 5/10/21 with shortness of breath.  Apparently lost quite a bit of mass lately and thought to have hypervolemia.    Interim Events:  His breathing is improved  Still with some productive cough  Got dizzy this morning during our visit, BP check 79/42  His L fingers look better this morning, less cyanotic  C/o of numbness/tingling and when the fingers warm up he gets sharp pains.   Has appt with Dr. Honeycutt on Friday.     ROS  Constitutional:  denies fatigue. + dizziness  Respiratory: improved shortness of breath, + productive cough.  Cardiovascular:  Denies chest pain.  no lower extremity edema.  Denies orthopnea or PND.  : anuric  GI:  Denies nausea/vomiting.  No abdominal distention.  Neuro:  Denies dizziness, syncope.  Hem/lymph: Denies easy bleeding/bruising.  Ext:   L index finger blue, cool, numbness, tingling at times.    All other review of systems reviewed and negative.    Past medical, surgical, social, and family history reviewed and unchanged from admission except as noted in HPI.    Medications: Reviewed in MAR  Current Facility-Administered Medications   Medication Dose Frequency Provider Last Rate Last Admin   • apixaban (ELIQUIS) tablet 5 mg  5 mg BID Macho Lang M.D.   5 mg at 05/12/21 0555   • furosemide (LASIX) injection 80 mg  80 mg Q DAY Macho Lang M.D.   80 mg at 05/12/21 0555   • heparin injection 1,800 Units  1,800 Units DIALYSIS PRN Mario Hunter M.D.   1,800 Units at 05/11/21 1203   • midodrine (PROAMATINE) tablet 5 mg  5 mg TID WITH MEALS Lucretia Villatoro M.D.   5 mg at 05/12/21 0813    • acetaminophen (Tylenol) tablet 650 mg  650 mg Q4HRS PRN Macho Lang M.D.   650 mg at 05/11/21 2101   • atorvastatin (LIPITOR) tablet 40 mg  40 mg QHS Macho Lang M.D.   40 mg at 05/11/21 2102   • calcitRIOL (ROCALTROL) capsule 0.25 mcg  0.25 mcg DAILY Macho Lang M.D.   0.25 mcg at 05/12/21 0555   • gabapentin (NEURONTIN) capsule 300 mg  300 mg DAILY Macho Lang M.D.   300 mg at 05/11/21 2102   • insulin glargine (Semglee) injection  8 Units QAM Macho Lang M.D.   8 Units at 05/12/21 0556   • methimazole (TAPAZOLE) tablet 5 mg  5 mg BID Macho Lang M.D.   5 mg at 05/12/21 0555   • tamsulosin (FLOMAX) capsule 0.8 mg  0.8 mg QHS Macho Lang M.D.   0.8 mg at 05/11/21 2102   • senna-docusate (PERICOLACE or SENOKOT S) 8.6-50 MG per tablet 2 tablet  2 tablet BID Macho Lang M.D.        And   • polyethylene glycol/lytes (MIRALAX) PACKET 1 Packet  1 Packet QDAY PRN Macho Lang M.D.        And   • magnesium hydroxide (MILK OF MAGNESIA) suspension 30 mL  30 mL QDAY PRN Macho Lang M.D.        And   • bisacodyl (DULCOLAX) suppository 10 mg  10 mg QDAY PRN Macho Lang M.D.       • morphine (pf) 4 mg/mL injection 2-4 mg  2-4 mg Q5 MIN PRN Macho Lang M.D.       • nitroglycerin (NITROSTAT) tablet 0.4 mg  0.4 mg Q5 MIN PRN Macho Lang M.D.       • Metoprolol Tartrate (LOPRESSOR) injection 5 mg  5 mg Q5 MIN PRN Macho Lang M.D.       • insulin regular (HumuLIN R,NovoLIN R) injection  1-6 Units 4X/DAY ACHCHARLY Lang M.D.        And   • glucose 4 g chewable tablet 16 g  16 g Q15 MIN PRN Macho Lang M.D.        And   • dextrose 50% (D50W) injection 50 mL  50 mL Q15 MIN PRN Macho Lang M.D.       • carvedilol (COREG) tablet 3.125 mg  3.125 mg BID WITH MEALS Macho Lang M.D.   3.125 mg at 05/11/21 0739   Last reviewed on 5/10/2021 11:39 PM by Tammi Osorio, HenrikT    Allergies   Allergen Reactions   • Mircera [Methoxy Polyethylene Glycol-Epoetin Beta] Hives   •  "Other Drug      \"Opiods\" caused  hallucinations       Physical Exam  Body mass index is 27.08 kg/m². BP (!) 81/39   Pulse 90   Temp 36.3 °C (97.4 °F)   Resp 18   Ht 1.676 m (5' 6\")   Wt 76.1 kg (167 lb 12.3 oz)   SpO2 98%    Vitals:    05/12/21 0921 05/12/21 0929 05/12/21 0931 05/12/21 0935   BP:  126/101 (!) 64/40 (!) 81/39   Pulse: (!) 110 95 90    Resp: 18 18 18    Temp:   36.3 °C (97.4 °F)    TempSrc:       SpO2: 96% 95% 98%    Weight:       Height:        Oxygen Therapy:  Pulse Oximetry: 98 %, O2 (LPM): 0, FiO2%: 21 %, O2 Delivery Device: None - Room Air    General: no apparent distress  Neck: no JVD   Lungs: normal effort, with scant crackle - improved from yesterday, no wheezing or rhonchi  Heart: normal rate, regular rhythm, no murmur, no rub  EXT: R BKA, L great toe amputation, no lower extremity edema.  Fistula to the L upper ext.  The fingers on the L hand are less cyanotic today, warmer.  Unable to palpate L radial or ulnar pulses.  Abdomen: soft, non tender, non distended  Neurological: No focal deficits, no facial asymmetry.  Normal speech  Psychiatric: Appropriate affect, alert and oriented x 3  Skin: Warm extremities, no rash    Labs (personally reviewed):     Lab Results   Component Value Date/Time    SODIUM 135 05/11/2021 06:30 AM    POTASSIUM 5.8 (H) 05/11/2021 06:30 AM    CHLORIDE 95 (L) 05/11/2021 06:30 AM    CO2 26 05/11/2021 06:30 AM    GLUCOSE 124 (H) 05/11/2021 06:30 AM    BUN 47 (H) 05/11/2021 06:30 AM    CREATININE 6.77 (HH) 05/11/2021 06:30 AM     Lab Results   Component Value Date/Time    ALKPHOSPHAT 119 (H) 05/10/2021 09:19 PM    ASTSGOT 12 05/10/2021 09:19 PM    ALTSGPT 8 05/10/2021 09:19 PM    TBILIRUBIN 0.4 05/10/2021 09:19 PM      Lab Results   Component Value Date/Time    CHOLSTRLTOT 158 05/11/2021 03:04 AM     (H) 05/11/2021 03:04 AM    HDL 25 (A) 05/11/2021 03:04 AM    TRIGLYCERIDE 156 (H) 05/11/2021 03:04 AM         Cardiac Imaging and Procedures Review:      " "    Personal Telemetry Review: SR in the 80s.    Echo 2020:  CONCLUSIONS  Compared to the images of the study done - 2020 there has been   improvement in LV function.  Normal left ventricular chamber size.  Left ventricular ejection fraction is visually estimated to be 55%.  Normal regional wall motion.  Moderately dilated left atrium.  Trace tricuspid regurgitation.  Estimated right ventricular systolic pressure  is 40 mmHg.    [facility] 2019:  CORONARY ARTERIOGRAPHY:  1.  Left main artery:  The left main artery is angiographically normal,   bifurcates into the left anterior descending artery and circumflex artery.  2.  Left anterior descending artery:  The left anterior descending artery   gives rise to a small caliber first diagonal branch, a normal complement of   septal branches and extends around the apex.  The left anterior descending   artery has a long stented segment from the proximal to mid vessel that is   patent.  The remainder of the artery is patent with mild diffuse atheromatous   disease of the distal and terminal vessel.  The jailed diagonal branch has   ostial eccentric 90% stenosis.  3.  Circumflex artery:  The circumflex gives rise to a tiny caliber first   marginal branch, 2 major long second and third marginal branches.  The   circumflex artery is patent with mild focal smooth atheromatous disease.  4.  Right coronary artery:  The right coronary artery gives rise to a   posterior descending artery and posterolateral branch.  The posterior   descending artery ostium has a focal smooth 50% stenosis.       POSTPROCEDURE DIAGNOSES:  1.  Patent coronary stents proximal mid, left anterior descending   artery.  2.  Coronary artery disease involving diagonal \"jailed\" branch   ostial 90% stenosis, posterior descending artery ostial 50% stenosis.    Assessment and Medical Decision Makin   shortness of breath   -    Suspect related to primary pulmonary condition - bronchitis? And pulm " edema?  -    unlikely related to primary cardiac etiology, but will get echo.     2   ESRD on HD T, TH, Sat  -    Having some hypotension after HD, will defer volume management to primary team and nehprology.  -    Follows with Dr. Tomlin out patient    3   CAD with elevated troponin  -    Troponin peaked at 657, down-trending  -    Unlikely ACS  -    Get echo    4   PAF  -    Continue low dose Eliquis  -    Maintaining SR today    5   Diabetes mellitus type II  -    A1 C 5.7     6   PAD  -    His L fingers (exculding the thumb) are improved today.  -    I contaced Dr. Honeycutt's office to alert him to the diminished blood flow to the L hand.  LM with his MA.  -    S/p R BKA, L great toe amputation.      Discussed case with Dr. Ureña, given newly reduced EF and hx of CAD will proceed with coronary angiogram, tomorrow.  Keep NPO at midnight.     Updated Dr. Hunter and Dr. Villatoro.    Tamiko Hankins PARodgerC  [facility]

## 2021-05-13 PROBLEM — I50.23 ACUTE ON CHRONIC SYSTOLIC CONGESTIVE HEART FAILURE (HCC): Status: ACTIVE | Noted: 2021-01-01

## 2021-05-13 NOTE — ASSESSMENT & PLAN NOTE
Echo EF 30%, global hypokinesis, which has dropped from 55% in 11/2020  [facility] by cardiology 5/13:  calcified ostial LAD and significant. stenosis is distal RCA at the ostium of the posterior descending artery.  PTCA of the ostial LAD was performed but non-dilatable with due to calcification.    Cardiology following, cancelled high risk, staged PCI of LAD with possible LV assist device. Rec medical managements and outpatient follow up    On statin, metoprolol. NO ACEI/ARB due to ESRD  I&O  Daily weight

## 2021-05-13 NOTE — THERAPY
Hold Therapy Eval    Patient Name: Luis Sepulveda  Age:  75 y.o., Sex:  male  Medical Record #: [id number]  Today's Date: 5/13/2021    PT eval held, pt with Cardiac Cath today will follow up tomorow as appropriate.

## 2021-05-13 NOTE — PROGRESS NOTES
Hemodialysis ordered by Dr. Hunter. Pt came to dialysis after cath lab. Treatment started at 1245 and ended at 1615. Pt stable, vss, no c/o post tx. See flow sheets for details. Net UF 3.0 L. Reported to PORFIRIO Gonzalez RN. Lt ua avf dressing clean, dry, intact. RT groin dressing clean, dry, intact.

## 2021-05-13 NOTE — PROCEDURES
REFERRING PHYSICIAN: Dr. Ureña and Dr. Rutherford    PREOPERATIVE DIAGNOSIS:  1.  Acutely decompensated heart failure with reduced ejection fraction  2.  New cardiomyopathy  3.  Known CAD status post prior PCI  4.  DNR/DNI  5.  Hemodialysis dependent renal failure, chronic under dialyzed    POSTOPERATIVE DIAGNOSIS:  1.  LVEF 15- 20%, LVEDP 30 mmHg  2.  80% ostial LAD in-stent restenosis and distal RCA disease  3.  PTCA ostial LAD without adequate stent expansion but good DARLYN flow      PROCEDURE PERFORMED:  Selective coronary angiography of the native vessels  Left heart catheterization  Left ventriculogram  PCI of LAD PTCA  Supervision moderate sedation    DESCRIPTION OF PROCEDURE:  The risks and benefits of cardiac catheterization as well as the procedure itself, rationale and appropriateness were discussed with the patient today. Complications including but not limited to death, stroke, MI, urgent bypass surgery, contrast nephropathy, vascular complications, bleeding and infection were explained to the patient. The potential outcomes associated with the procedure (possible PCI, possible CABG, possible medical Rx only) were also discussed at length. The patient agreed to proceed.    The patient was transported to the catheterization laboratory in the fasting state. The bilateral groins were prepped and draped in the usual sterile fashion. The right femoral area was anesthetized with 1% Xylocaine and fluoroscopically localized. Following this a single front wall puncture was used to place a 4 F side-arm sheath. Through this sheath a JR4/3DRC and JL4 were advanced in succession over a guidewire for cannulation of the coronary arteries. Contrast was administered and multiple images were obtained in the standard planes. Following this the catheters were exchanged over a guidewire for an angled pigtail catheter used across the aortic valve. Routine hemodynamics were obtained, contrast was administered at 10 mL/s for 30  mL left ventriculogram and pullback gradient was recorded.    At this point patient's referring cardiologist's were contacted.  Discussed with the referring cardiologist who suggested proceeding for a trial of PCI to the ostial LAD for new reduction in LVEF.     DESCRIPTION OF PCI:  The decision was made to intervene on the culprit coronary artery.  Heparin was administered to achieve an adequate ACT.  The diagnostic catheter was exchanged over a wire for an 6 Mongolian sheath followed by a 6 Mongolian EBU 3.5 guide seated appropriately. A BMW 0.014 floppy tip wire was navigated across the culprit stenosis. A 2.0 mm followed by 2.5 mm noncompliant balloons were used to predilate the lesion without significant expansion of the most severe segment of ISR.  Subsequently a 3.0 mm angioscope balloon was unable to cross the ostial LAD.  Patient began exhibiting signs of pulmonary edema related to elevated pre-existing filling pressures secondary to acutely decompensated heart failure and chronic under dialysis.  At this point the procedure was abandoned for consideration of his goals of care and to proceed with improved compensation from an acute heart failure standpoint.  He will be transported to hemodialysis today.  The right femoral artery was angiographically evaluated and found to be suitable for closure and a Perclose device was deployed without issue.  Patient tolerated the procedure and left the Cath Lab in stable condition.      Moderate sedation directly monitored by me during the case while supervising the administration of the sedation medication by an independent trained RN to assist in the monitoring of the patient's level of consciousness and physiological status. I, the supervising physician was present the entire time from beginning of medication administration until the end of the procedure from 11:40 AM until 12:16 PM. For detailed administration records please see the moderate sedation documentation in the  median tab.      FINDINGS:  I. HEMODYNAMICS:    Ao: 100/48 mmHg   LEDP: 30 mmHg   Gradient on LV pullback: No    II. LEFT VENTRICULOGRAM:   LVEF GONZALES PROJECTION: 15%     III. CORONARY ANGIOGRAPHY:  Left Main: Large bifurcating no CAD  Left Anterior Descending: Moderate caliber with a long segment of stenting in its proximal portion.  The ostial LAD is notable for an 80% eccentric calcified segment of in-stent restenosis.  Aggressive PTCA was unable to expand the segment of ISR at the ostium despite reaching 20 ko.  Cutting balloons were unable to cross.  Postintervention there is DARLYN-3 flow and 70% residual stenosis.  Left Circumflex: Moderate caliber mild nonobstructive CAD moderate caliber  Right Coronary: Distal 70% stenosis.    COMPLICATIONS: none apparent    CONCLUSIONS:  1.  LVEF 15- 20%, LVEDP 30 mmHg  2.  80% ostial LAD in-stent restenosis and distal RCA disease  3.  PTCA ostial LAD without adequate stent expansion but good DARLYN flow    RECOMMENDATIONS:  Recommend further complicating the patient and consideration for complex high risk reattempt at ostial LAD intervention should that be within his goals of care as he is apparently DNR/DNI.  Prior to this I recommend further compensation from heart failure standpoint with achieving a dry weight and euvolemia through more aggressive hemodialysis which she is planning on doing today.  Would likely recommend left regular support and laser atherectomy to attempt to expand his area of ISR.  He would need to change his CODE STATUS to full code prior to embarking on any high risk complex procedure such as those outlined above.    Following the procedure I discussed the results with the patient and the patients designated contact/family member Miguel at [phone].  There was no answer and there was no name associated with a voicemail box so therefore no message could be left.

## 2021-05-13 NOTE — CARE PLAN
Problem: Safety  Goal: Will remain free from injury  Outcome: Progressing     Problem: Knowledge Deficit  Goal: Knowledge of disease process/condition, treatment plan, diagnostic tests, and medications will improve  Outcome: Progressing     Problem: Pain Management  Goal: Pain level will decrease to patient's comfort goal  Outcome: Progressing

## 2021-05-13 NOTE — CARE PLAN
The patient is Watcher - Medium risk of patient condition declining or worsening         Summary of progress made towards problems/goals:  Pt VS monitored closely throughout shift. Rapid RN's notified and following. Pt educated. MD notified.       Problem: Fluid Volume:  Goal: Will maintain balanced intake and output  Outcome: Not Progressing

## 2021-05-13 NOTE — PROGRESS NOTES
Hospital Medicine Daily Progress Note    Date of Service  5/13/2021    Chief Complaint  75 y.o. male admitted 5/10/2021 with   Chief Complaint   Patient presents with   • Shortness of Breath     Pt reports incureased use of accessory muscles, O2 at home >90%.         Hospital Course  75 y.o. male with history of ESRD on hemodialysis TTS, A. fib on eliquis, and diabetes and PAD, who presented 5/10/2021 for evaluation of 4 days of shortness of breath and cough described as trouble clearing his throat but ultimately nonproductive.  He admits a 5-minute episode of dull left-sided nonradiating 3-5 intensity chest pain which resolved spontaneously without intervention it was associated and palpitations with nausea or vomiting.  Work-up reveals BNP greater than 30,000 and a troponin of 657 from a baseline of mid 100s and an EKG with atrial fibrillation with RVR and PVCs but no acute ischemic changes and unchanged from previous.  Trop 657>552  Echo: EF 30%, global hypokinesis. Cardiology plans [facility] 5/13  Nephrology consulted for HD  Get procal normal, blood culture neg to date  CXR personally reviewed: Left lung base infiltrate or atelectasis with small left pleural effusion; Perihilar interstitial prominence and bronchial wall cuffing, appearance suggests changes of underlying bronchial inflammation, consider bronchitis.    Rapid was called 5/12 due to symptomatic hypotension with Sbp 60-70. Report dizziness and drowsiness. NS 1L bolus given. Midodrine inc to 10mg tid.     PT/OT    Interval Problem Update  Patient was seen at the bedside. Bp improving, Denies dizziness or drowsiness. No chest pain, sob.   Await [facility] today followed by HD.  .  All Data, Medication data reviewed.  Case discussed with nursing, CM,  nurse, pharmacy in IDT rounds.     Consultants/Specialty  Cardiology  nephrology    Code Status  DNAR/DNI    Disposition  TBD    Review of Systems  Review of Systems   Constitutional: Negative for chills and  fever.   HENT: Negative for ear discharge.    Eyes: Negative for blurred vision.   Respiratory: Positive for cough and shortness of breath. Negative for hemoptysis.    Cardiovascular: Positive for chest pain. Negative for palpitations.   Gastrointestinal: Negative for nausea and vomiting.   Genitourinary: Negative for dysuria.   Musculoskeletal: Negative for myalgias.   Skin: Negative for rash.   Neurological: Positive for dizziness and weakness. Negative for focal weakness.   Endo/Heme/Allergies: Does not bruise/bleed easily.   Psychiatric/Behavioral: Negative for depression.   All other systems reviewed and are negative.       Physical Exam  Temp:  [36.1 °C (96.9 °F)-36.6 °C (97.8 °F)] 36.6 °C (97.8 °F)  Pulse:  [] 86  Resp:  [16-18] 18  BP: ()/(36-81) 126/38  SpO2:  [90 %-100 %] 92 %    Physical Exam  Vitals and nursing note reviewed.   Constitutional:       General: He is not in acute distress.     Appearance: Normal appearance.   HENT:      Head: Normocephalic and atraumatic.      Mouth/Throat:      Mouth: Mucous membranes are dry.      Pharynx: Oropharynx is clear.   Eyes:      Pupils: Pupils are equal, round, and reactive to light.   Cardiovascular:      Rate and Rhythm: Normal rate. Rhythm irregular.   Pulmonary:      Effort: Pulmonary effort is normal. No respiratory distress.      Breath sounds: Normal breath sounds. No wheezing.   Abdominal:      General: Abdomen is flat. Bowel sounds are normal.      Palpations: Abdomen is soft.   Musculoskeletal:         General: No swelling or tenderness. Normal range of motion.      Comments: R BKA, L great toe amputation  Fistula to the L upper ext  L fingers blue, cool to touch   Skin:     General: Skin is warm and dry.   Neurological:      General: No focal deficit present.      Mental Status: He is alert and oriented to person, place, and time.   Psychiatric:         Mood and Affect: Mood normal.         Behavior: Behavior normal.         Fluids  No  intake or output data in the 24 hours ending 05/13/21 1044    Laboratory  Recent Labs     05/11/21  0630 05/12/21  0339 05/12/21  1007   WBC 6.5 7.5 5.6   RBC 2.73* 2.85* 2.67*   HEMOGLOBIN 9.2* 9.5* 9.1*   HEMATOCRIT 27.3* 29.3* 27.0*   .0* 102.8* 101.1*   MCH 33.7* 33.3* 34.1*   MCHC 33.7 32.4* 33.7   RDW 55.1* 56.6* 55.5*   PLATELETCT 215 210 223   MPV 9.7 9.6 9.7     Recent Labs     05/10/21  2119 05/11/21  0630 05/12/21  1007   SODIUM 127* 135 136   POTASSIUM 5.5 5.8* 4.5   CHLORIDE 88* 95* 97   CO2 26 26 28   GLUCOSE 129* 124* 171*   BUN 42* 47* 30*   CREATININE 6.41* 6.77* 5.13*   CALCIUM 11.3* 10.8* 9.5             Recent Labs     05/11/21  0304   TRIGLYCERIDE 156*   HDL 25*   *       Imaging  US-HEMODIALYSIS GRAFT DUPLEX COMP UPPER EXTREMITY   Final Result      EC-ECHOCARDIOGRAM COMPLETE W/O CONT   Final Result      DX-CHEST-PORTABLE (1 VIEW)   Final Result         1.  Left lung base infiltrate or atelectasis with small left pleural effusion   2.  Cardiomegaly   3.  Perihilar interstitial prominence and bronchial wall cuffing, appearance suggests changes of underlying bronchial inflammation, consider bronchitis.      CL-LEFT HEART CATHETERIZATION WITH POSSIBLE INTERVENTION    (Results Pending)        Assessment/Plan  * Shortness of breath  Assessment & Plan  Multifactorial secondary to pulmonary edema vs. Bronchitis vs. Heart failure  ProBNP >61132  CXR personally reviewed: Left lung base infiltrate or atelectasis with small left pleural effusion; Perihilar interstitial prominence and bronchial wall cuffing, appearance suggests changes of underlying bronchial inflammation, consider bronchitis.  Echo: EF 30%, global hypokinesis    Check procalcitonin normal, blood culture neg to date  Nephrology for dialysis  Cardiology plans [facility] 5/13    Acute on chronic systolic congestive heart failure (HCC)  Assessment & Plan  Echo EF 30%, global hypokinesis, which has dropped from 55% in 11/2020  [facility] by  cardiology 5/13  On statin, metoprolol. NO ACEI/ARB due to ESRD  I&O  Daily weight    Atrial fibrillation with RVR ([facility])  Assessment & Plan  Rate controlled   Cont eliquis and coreg      Hypotension  Assessment & Plan  He had dialysis on 5/11 and reported 4L fluid has been removed. Suspecting hypotension sec to over dialysis and low cardiac output  Echo: EF 30%, which has decreased from 55% in 11/2020  Symptomatic with dizziness and drowsiness  Start IVF bolus  Midodrine   Cont tele monitoring    End stage renal disease on dialysis ([facility])- (present on admission)  Assessment & Plan  On TTS schedule  Nephrology consulted and following    Type 2 diabetes mellitus with kidney complication, with long-term current use of insulin ([facility])- (present on admission)  Assessment & Plan  Regular insulin sliding scale, follow-up A1c 5.7  On insulin glargine  accucheck with hypoglycemic protocol    Advance care planning  Assessment & Plan  Code status discussed with palliative care team, appreciated  Patient requests DNR/DNI. POLST form signed    Amputated great toe of left foot ([facility])  Assessment & Plan  Hx of     Hyperkalemia  Assessment & Plan  Without peak T waves, dialysis TTS  Nephrology following    Status post below-knee amputation of right lower extremity ([facility])- (present on admission)  Assessment & Plan  Hx of   Sec to PAD and diabetic ulcer    Peripheral vascular disease ([facility])- (present on admission)  Assessment & Plan  L fingers bluish discoloration and tool to touch, reports numbing and tingling  Followed by vascular Dr. Honeycutt     Dyslipidemia- (present on admission)  Assessment & Plan  Cont statin    Anemia- (present on admission)  Assessment & Plan  Complicated by end-stage renal failure on dialysis.   No sign of bleeding  Transfuse if Hb<7   Follow-up anemia labs, c/w chronic disease anemia    Hyperthyroidism- (present on admission)  Assessment & Plan  Follow-up TSH normal  Continue Tapazole       VTE prophylaxis:  eliquis

## 2021-05-13 NOTE — PROGRESS NOTES
"[facility] -  PROGRESS NOTE               Author: Mario Hunter M.D. Date & Time: 5/13/2021  8:07 AM     HPI:  75-year-old male with history of ESRD, on TTS HD, A. fib on Eliquis, diabetes mellitus, peripheral vascular disease presented with complaints of increasing shortness of breath along with occasional cough and mucus production for past couple of weeks.  No fever/chills.  Patient is compliant with hemodialysis sessions.  Diagnostic work-up significant for BNP in 35,000s, troponin of 657--> 552, EKG showing atrial fibrillation with RVR.  Chest x-ray: Small left pleural effusion and perihilar infiltrates.  K5.8 this AM.  Nephrology consulted for inpatient hemodialysis.     DAILY NEPHROLOGY SUMMARY:  5/12: Underwent HD yesterday.  Hypotensive this a.m with dizziness,. received 1 L fluid bolus.  Echo: LVEF 30%, global hypokinesis.  No chest pain  5/13: Pending cath this AM, Fatigued but denies chest pain. Bps improved this AM    PAST FAMILY HISTORY: Reviewed and Unchanged  SOCIAL HISTORY: Reviewed and Unchanged  CURRENT MEDICATIONS: Reviewed  IMAGING STUDIES: Reviewed    ROS  Reviewed, negative other than stated above    PHYSICAL EXAM  VS:  /78   Pulse 93   Temp 36.2 °C (97.2 °F) (Temporal)   Resp 18   Ht 1.676 m (5' 6\")   Wt 76.1 kg (167 lb 12.3 oz)   SpO2 95%   BMI 27.08 kg/m²   GENERAL: no acute distress  CV: rhythm irregular, No edema  RESP: CTAB  GI: Soft  MSK: R BKA, L great toe amputation. Cyanotic left finger tips.  SKIN: No concerning rashes  NEURO: AOx3  PSYCH: Cooperative  ACCESS: LUE AVF    Fluids:  In: 240 [P.O.:240]  Out: -     LABS:  Recent Results (from the past 24 hour(s))   POCT glucose device results    Collection Time: 05/12/21  9:24 AM   Result Value Ref Range    Glucose - Accu-Ck 139 (H) 65 - 99 mg/dL   Basic Metabolic Panel    Collection Time: 05/12/21 10:07 AM   Result Value Ref Range    Sodium 136 135 - 145 mmol/L    Potassium 4.5 3.6 - 5.5 mmol/L "    Chloride 97 96 - 112 mmol/L    Co2 28 20 - 33 mmol/L    Glucose 171 (H) 65 - 99 mg/dL    Bun 30 (H) 8 - 22 mg/dL    Creatinine 5.13 (HH) 0.50 - 1.40 mg/dL    Calcium 9.5 8.5 - 10.5 mg/dL    Anion Gap 11.0 7.0 - 16.0   CBC WITH DIFFERENTIAL    Collection Time: 05/12/21 10:07 AM   Result Value Ref Range    WBC 5.6 4.8 - 10.8 K/uL    RBC 2.67 (L) 4.70 - 6.10 M/uL    Hemoglobin 9.1 (L) 14.0 - 18.0 g/dL    Hematocrit 27.0 (L) 42.0 - 52.0 %    .1 (H) 81.4 - 97.8 fL    MCH 34.1 (H) 27.0 - 33.0 pg    MCHC 33.7 33.7 - 35.3 g/dL    RDW 55.5 (H) 35.9 - 50.0 fL    Platelet Count 223 164 - 446 K/uL    MPV 9.7 9.0 - 12.9 fL    Neutrophils-Polys 65.50 44.00 - 72.00 %    Lymphocytes 21.50 (L) 22.00 - 41.00 %    Monocytes 11.70 0.00 - 13.40 %    Eosinophils 0.40 0.00 - 6.90 %    Basophils 0.50 0.00 - 1.80 %    Immature Granulocytes 0.40 0.00 - 0.90 %    Nucleated RBC 0.00 /100 WBC    Neutrophils (Absolute) 3.65 1.82 - 7.42 K/uL    Lymphs (Absolute) 1.20 1.00 - 4.80 K/uL    Monos (Absolute) 0.65 0.00 - 0.85 K/uL    Eos (Absolute) 0.02 0.00 - 0.51 K/uL    Baso (Absolute) 0.03 0.00 - 0.12 K/uL    Immature Granulocytes (abs) 0.02 0.00 - 0.11 K/uL    NRBC (Absolute) 0.00 K/uL   ESTIMATED GFR    Collection Time: 05/12/21 10:07 AM   Result Value Ref Range    GFR If  13 (A) >60 mL/min/1.73 m 2    GFR If Non African American 11 (A) >60 mL/min/1.73 m 2   EC-ECHOCARDIOGRAM COMPLETE W/O CONT    Collection Time: 05/12/21 10:51 AM   Result Value Ref Range    Eject.Frac. MOD BP 35.63     Eject.Frac. MOD 4C 36.04     Eject.Frac. MOD 2C 34.85     Left Ventrical Ejection Fraction 30    POCT glucose device results    Collection Time: 05/12/21  4:54 PM   Result Value Ref Range    Glucose - Accu-Ck 114 (H) 65 - 99 mg/dL       (click the triangle to expand results)      ASSESSMENT:  # ESRD: normally T/T/S via. AVF.   # Hypotension: Concern for cardigenic etiology  # Cardiomyopathy: EF decreased to 30%  # Anemia in CKD  # Atrial  fibrillation  # Diabetes mellitus  # Hyperthyroidism  # Hyperkalemia  # Elevated troponin     PLAN:  -HD today, to continue T/T/S iHD during stay  -Pending [facility]  -Dose all meds for ESRD  -Renal Diet  -I/O, daily weights  -Epogen with HD  -Continue calcitriol, phos binder    Thank you,    Mario Hunter

## 2021-05-13 NOTE — THERAPY
Missed Therapy     Patient Name: Luis Sepulveda  Age:  75 y.o., Sex:  male  Medical Record #: [id number]  Today's Date: 5/13/2021 05/13/21 1154   Initial Contact Note    Initial Contact Note Order Received and Verified, Occupational Therapy Evaluation in Progress with Full Report to Follow.   Interdisciplinary Plan of Care Collaboration   Collaboration Comments OT eval held, pt in IR for [facility] today will follow up tomorow as appropriate    Session Information   Date / Session Number  5/13 HOLD

## 2021-05-13 NOTE — PROGRESS NOTES
[facility]iology Follow-up Note    Date of Service:    5/13/2021      Name:   Luis Sepulveda   YOB: 1945  Age:   75 y.o.  male   MRN:   [id number]      Chief Complaint: shortness of breath     Primary cardiologist:  Dr. Osborn    HPI:  Mr Sepulveda is a 74 y/o fellow with PAF on low dose Eliquis, CAD, ESRD on HD T, Th, Sa who presented to [facility] on 5/10/21 with shortness of breath.  Apparently lost quite a bit of mass lately and thought to have hypervolemia.    Interim Events:  He is feeling less dizzy today  BP improved  In the last 30 minutes or so he feels a little shortness of breath  O2 sat stable, but felt better when placed on O2 per NC (2 lpm)    ROS  Constitutional:  denies fatigue. Dizziness improved  Respiratory: + shortness of breath, + productive cough.  Cardiovascular:  Denies chest pain.  no lower extremity edema.  Denies orthopnea or PND.  : anuric  GI:  Denies nausea/vomiting.  No abdominal distention.  Neuro:  Denies dizziness, syncope.  Hem/lymph: Denies easy bleeding/bruising.  Ext:   L index finger blue, cool, numbness, tingling at times.    All other review of systems reviewed and negative.    Past medical, surgical, social, and family history reviewed and unchanged from admission except as noted in HPI.    Medications: Reviewed in MAR  Current Facility-Administered Medications   Medication Dose Frequency Provider Last Rate Last Admin   • midodrine (PROAMATINE) tablet 10 mg  10 mg TID WITH MEALS Lucretia Villatoro M.D.   10 mg at 05/13/21 0846   • guaiFENesin ER (MUCINEX) tablet 600 mg  600 mg Q12HRS Lucretia Villatoro M.D.   600 mg at 05/13/21 0619   • apixaban (ELIQUIS) tablet 5 mg  5 mg BID Macho Lang M.D.   5 mg at 05/13/21 0619   • furosemide (LASIX) injection 80 mg  80 mg Q DAY Macho Lang M.D.   80 mg at 05/12/21 0555   • heparin injection 1,800 Units  1,800 Units DIALYSIS PRN Mario Hunter M.D.   1,800 Units at 05/11/21 1203   •  acetaminophen (Tylenol) tablet 650 mg  650 mg Q4HRS PRN Macho Lang M.D.   650 mg at 05/12/21 1242   • atorvastatin (LIPITOR) tablet 40 mg  40 mg QHS Macho Lang M.D.   40 mg at 05/12/21 2030   • calcitRIOL (ROCALTROL) capsule 0.25 mcg  0.25 mcg DAILY Macho Lang M.D.   0.25 mcg at 05/13/21 0619   • gabapentin (NEURONTIN) capsule 300 mg  300 mg DAILY Macho Lang M.D.   300 mg at 05/12/21 2030   • insulin glargine (Semglee) injection  8 Units QAM Macho Lang M.D.   8 Units at 05/13/21 0622   • methimazole (TAPAZOLE) tablet 5 mg  5 mg BID Macho Lang M.D.   5 mg at 05/13/21 0619   • tamsulosin (FLOMAX) capsule 0.8 mg  0.8 mg QHS Macho Lang M.D.   0.8 mg at 05/12/21 2030   • senna-docusate (PERICOLACE or SENOKOT S) 8.6-50 MG per tablet 2 tablet  2 tablet BID Macho Lang M.D.        And   • polyethylene glycol/lytes (MIRALAX) PACKET 1 Packet  1 Packet QDAY PRN Macho Lang M.D.        And   • magnesium hydroxide (MILK OF MAGNESIA) suspension 30 mL  30 mL QDAY PRN Macho Lang M.D.        And   • bisacodyl (DULCOLAX) suppository 10 mg  10 mg QDAY PRN Macho Lang M.D.       • morphine (pf) 4 mg/mL injection 2-4 mg  2-4 mg Q5 MIN PRN Macho Lang M.D.       • nitroglycerin (NITROSTAT) tablet 0.4 mg  0.4 mg Q5 MIN PRN Macho Lang M.D.       • Metoprolol Tartrate (LOPRESSOR) injection 5 mg  5 mg Q5 MIN PRN Macho Lang M.D.       • insulin regular (HumuLIN R,NovoLIN R) injection  1-6 Units 4X/DAY ACHCHARLY Lang M.D.        And   • glucose 4 g chewable tablet 16 g  16 g Q15 MIN PRN Macho E Rickey, M.D.        And   • dextrose 50% (D50W) injection 50 mL  50 mL Q15 MIN PRN Macho Lang M.D.       • carvedilol (COREG) tablet 3.125 mg  3.125 mg BID WITH MEALS Macho Lang M.D.   3.125 mg at 05/13/21 0846   Last reviewed on 5/10/2021 11:39 PM by Tammi Osorio PhT    Allergies   Allergen Reactions   • Mircera [Methoxy Polyethylene Glycol-Epoetin Beta] Hives   • Other  "Drug      \"Opiods\" caused  hallucinations       Physical Exam  Body mass index is 27.08 kg/m². BP (!) 126/38   Pulse 86   Temp 36.6 °C (97.8 °F) (Temporal)   Resp 18   Ht 1.676 m (5' 6\")   Wt 76.1 kg (167 lb 12.3 oz)   SpO2 92%    Vitals:    05/12/21 1912 05/12/21 2326 05/13/21 0322 05/13/21 0836   BP: (!) 90/54 121/63 127/78 (!) 126/38   Pulse: 89 90 93 86   Resp: 18 18 18 18   Temp: 36.2 °C (97.2 °F) 36.1 °C (96.9 °F) 36.2 °C (97.2 °F) 36.6 °C (97.8 °F)   TempSrc: Temporal Temporal Temporal Temporal   SpO2: 91% 90% 95% 92%   Weight:       Height:        Oxygen Therapy:  Pulse Oximetry: 92 %, O2 (LPM): 0, O2 Delivery Device: None - Room Air    General: no apparent distress  Neck: no JVD   Lungs: normal effort, increased bibasilar crackles, no wheezing or rhonchi  Heart: normal rate, regular rhythm, no murmur, no rub  EXT: R BKA, L great toe amputation, no lower extremity edema.  Fistula to the L upper ext.  The fingers on the L hand are only slightly cyanotic today.     Abdomen: soft, non tender, non distended  Neurological: No focal deficits, no facial asymmetry.  Normal speech  Psychiatric: Appropriate affect, alert and oriented x 3  Skin: Warm extremities, no rash    Labs (personally reviewed):     Lab Results   Component Value Date/Time    SODIUM 136 05/12/2021 10:07 AM    POTASSIUM 4.5 05/12/2021 10:07 AM    CHLORIDE 97 05/12/2021 10:07 AM    CO2 28 05/12/2021 10:07 AM    GLUCOSE 171 (H) 05/12/2021 10:07 AM    BUN 30 (H) 05/12/2021 10:07 AM    CREATININE 5.13 (HH) 05/12/2021 10:07 AM     Lab Results   Component Value Date/Time    ALKPHOSPHAT 119 (H) 05/10/2021 09:19 PM    ASTSGOT 12 05/10/2021 09:19 PM    ALTSGPT 8 05/10/2021 09:19 PM    TBILIRUBIN 0.4 05/10/2021 09:19 PM      Lab Results   Component Value Date/Time    CHOLSTRLTOT 158 05/11/2021 03:04 AM     (H) 05/11/2021 03:04 AM    HDL 25 (A) 05/11/2021 03:04 AM    TRIGLYCERIDE 156 (H) 05/11/2021 03:04 AM         Cardiac Imaging and Procedures " Review:      Personal Telemetry Review: SR in the 80s.    Echo 5/12/21:  CONCLUSIONS  Compared to the images of the study done 11/14/2020 - there is now   reduced ejection fraction with atrial fibrillation.     Moderately reduced left ventricular systolic function.  Left ventricular ejection fraction is visually estimated to be 30%.  Global hypokinesis.  Diastolic function is difficult to assess with atrial fibrillation.  The right ventricle was normal in size and function.  No significant valve disease or flow abnormalities.      These findings communicated to the ordering physician at the time of   the exam.    Echo 11/14/2020:  CONCLUSIONS  Compared to the images of the study done - 04/22/2020 there has been   improvement in LV function.  Normal left ventricular chamber size.  Left ventricular ejection fraction is visually estimated to be 55%.  Normal regional wall motion.  Moderately dilated left atrium.  Trace tricuspid regurgitation.  Estimated right ventricular systolic pressure  is 40 mmHg.    [facility] 11/16/2019:  CORONARY ARTERIOGRAPHY:  1.  Left main artery:  The left main artery is angiographically normal,   bifurcates into the left anterior descending artery and circumflex artery.  2.  Left anterior descending artery:  The left anterior descending artery   gives rise to a small caliber first diagonal branch, a normal complement of   septal branches and extends around the apex.  The left anterior descending   artery has a long stented segment from the proximal to mid vessel that is   patent.  The remainder of the artery is patent with mild diffuse atheromatous   disease of the distal and terminal vessel.  The jailed diagonal branch has   ostial eccentric 90% stenosis.  3.  Circumflex artery:  The circumflex gives rise to a tiny caliber first   marginal branch, 2 major long second and third marginal branches.  The   circumflex artery is patent with mild focal smooth atheromatous disease.  4.  Right coronary  "artery:  The right coronary artery gives rise to a   posterior descending artery and posterolateral branch.  The posterior   descending artery ostium has a focal smooth 50% stenosis.       POSTPROCEDURE DIAGNOSES:  1.  Patent coronary stents proximal mid, left anterior descending   artery.  2.  Coronary artery disease involving diagonal \"jailed\" branch   ostial 90% stenosis, posterior descending artery ostial 50% stenosis.    Assessment and Medical Decision Makin   shortness of breath   -    Probably due to slight pulm edema this AM given increasing crackles to the bases in the setting of new CMO,  he also got some fluid resuscitation while hypotensive yesterday.    2   ESRD on HD T, TH, Sat  -    HD to follow LHC/angio today.  -    Follows with Dr. Tomlin out patient    3   CAD with elevated troponin  -    Troponin peaked at 657, down-trending  -    Echo with newly reduced EF 30%  -    Planing LHC today to assess coronaries    4   New cardiomyopathy, EF 30%  -    See above plan  -    Volume is managed by dialysis    5    PAF  -    Continue low dose Eliquis  -    Maintaining SR today    5   Diabetes mellitus type II  -    A1 C 5.7     6   PAD  -    His L fingers (exculding the thumb) are improved today.  -    I contaced Dr. Honeycutt's office to alert him to the diminished blood flow to the L hand.  LM with his MA.  -    S/p R BKA, L great toe amputation.    Tamiko Hankins PA-C  [facility]                "

## 2021-05-14 NOTE — PROGRESS NOTES
RN notified me that patient has a POLST form of DNR/DNI. I confirmed with patient who is AAO x 4 that his wishes are not to be resuscitated or aggressive measures including intubation in front of witness Luciana Delarosa RN.

## 2021-05-14 NOTE — CARE PLAN
Problem: Knowledge Deficit  Goal: Knowledge of disease process/condition, treatment plan, diagnostic tests, and medications will improve  Note: Updated on plan of care. Encouraged to ask questions and voice concerns. Questions and concerns addressed appropriately.      Problem: Pain Management  Goal: Pain level will decrease to patient's comfort goal  Note: Pain goal established with patient. Pain medications provided as prescribed.   The patient is Watcher - Medium risk of patient condition declining or worsening         Progress made toward(s) clinical / shift goals:  progressing    Patient is not progressing towards the following goals:

## 2021-05-14 NOTE — THERAPY
"Occupational Therapy   Initial Evaluation     Patient Name: Luis Sepulveda  Age:  75 y.o., Sex:  male  Medical Record #: [id number]  Today's Date: 5/14/2021     Precautions: Fall Risk  Comments: R BKA, Low BP (81/41 sitting EOB)    Assessment  Patient is 75 y.o. male admitted for increased WOB. PMHX: renal failure, Afib w/RVR, DM, PVD, arthritis, CAD, CDK, CHF, OA, BKA and sleep apnea. This admission pt is dx w/acute on chronic systolic CHF, Afib w/RVR, and hypotension and s/p LHC. Pt is likely near his functional baseline but is limited by the LUE pain and generalized poor activity tolerance. Anticipate progress in this setting to d/c home w/HH     Plan  Recommend Occupational Therapy 2 times per week until therapy goals are met for the following treatments:  Self Care/Activities of Daily Living, Therapeutic Activities and Therapeutic Exercises.    DC Equipment Recommendations: Unable to determine at this time  Discharge Recommendations: Recommend home health for continued occupational therapy services     Subjective  \"I just want my hand to be ok\"      Objective     05/14/21 1003   Total Time Spent   Total Time Spent (Mins) 15   Charge Group   OT Evaluation OT Evaluation Mod   Initial Contact Note    Initial Contact Note Order Received and Verified, Occupational Therapy Evaluation in Progress with Full Report to Follow.   Prior Living Situation   Prior Services None   Housing / Facility 1 Story House   Steps Into Home 0   Steps In Home 0   Bathroom Set up Bathtub / Shower Combination;Tub Transfer Bench   Equipment Owned Front-Wheel Walker;Wheelchair   Lives with - Patient's Self Care Capacity Spouse;Adult Children   Comments Pt is home alone during the day    Prior Level of ADL Function   Self Feeding Independent   Grooming / Hygiene Independent   Bathing Independent   Dressing Independent   Toileting Independent   Prior Level of IADL Function   Medication Management Independent   Laundry Requires Assist "   Kitchen Mobility Requires Assist   Finances Requires Assist   Home Management Requires Assist   Shopping Requires Assist   Prior Level Of Mobility Independent With Device in Home   Driving / Transportation Relatives / Others Provide Transportation   Precautions   Precautions Fall Risk   Comments hx of BKA    Pain 0 - 10 Group   Location Hand   Location Orientation Left;Distal   Therapist Pain Assessment Nurse Notified  (not rated )   Cognition    Cognition / Consciousness WDL   Level of Consciousness Alert   Comments very pleasant and cooperative    Passive ROM Upper Body   Passive ROM Upper Body WDL   Active ROM Upper Body   Active ROM Upper Body  WDL   Dominant Hand Left   Strength Upper Body   Upper Body Strength  X   Comments LUE distally limited by pain d/t vascular issues    Sensation Upper Body   Upper Extremity Sensation  Not Tested   Upper Body Muscle Tone   Upper Body Muscle Tone  WDL   Neurological Concerns   Neurological Concerns No   Coordination Upper Body   Coordination X   Comments LUE hand impaired fine motor    Balance Assessment   Sitting Balance (Static) Fair   Sitting Balance (Dynamic) Fair   Standing Balance (Static) Fair   Standing Balance (Dynamic) Fair   Weight Shift Sitting Fair   Weight Shift Standing Fair   Comments w/fww    Bed Mobility    Comments up EOB and remained up    ADL Assessment   Grooming Supervision;Seated   Upper Body Dressing Minimal Assist   Lower Body Dressing Minimal Assist   Toileting   (NT )   How much help from another person does the patient currently need...   6 Clicks Daily Activity Score 21   Functional Mobility   Sit to Stand Supervised   Bed, Chair, Wheelchair Transfer Supervised   Mobility EOB sit>Stand 2 hops then BTB    Visual Perception   Visual Perception  Not Tested   Edema / Skin Assessment   Edema / Skin  Not Assessed   Activity Tolerance   Comments no overt  c/o pain or fatigue    Patient / Family Goals   Patient / Family Goal #1 none stated    Short  Term Goals   Short Term Goal # 1 pt will complete toilet txf w/spv    Short Term Goal # 2 pt will complete LB dressing w/spv    Short Term Goal # 3 pt will complete grooming seated/standnig at sink w/spv    Education Group   Role of Occupational Therapist Patient Response Patient;Acceptance;Explanation   Problem List   Problem List Decreased Active Daily Living Skills;Decreased Functional Mobility;Decreased Activity Tolerance;Impaired Postural Control / Balance   Anticipated Discharge Equipment and Recommendations   DC Equipment Recommendations Unable to determine at this time   Discharge Recommendations Recommend home health for continued occupational therapy services   Interdisciplinary Plan of Care Collaboration   IDT Collaboration with  Nursing   Patient Position at End of Therapy Call Light within Reach;Tray Table within Reach;Phone within Reach;Edge of Bed   Collaboration Comments RN aware of OT eval and pts efforts    Session Information   Date / Session Number  5/14 #1 (1/2, 5/20)   Priority 2

## 2021-05-14 NOTE — THERAPY
Physical Therapy   Initial Evaluation     Patient Name: Luis Sepulveda  Age:  75 y.o., Sex:  male  Medical Record #: [id number]  Today's Date: 5/14/2021     Precautions: (P) Fall Risk    Assessment  Patient is 75 y.o. male with a diagnosis of SOB with heart failure and complications from ESRD.  PMH: DM2 and PVD. Patient lives at home in single story home with wife and adult son who work during day and assist with transportation as needed. HHPT and HH Nsg during week. Has FWW and w/c and RLE prosthetic at home. Demos bed mobility with SPV and short distance gait/hop <5 feet with SPV. BP: 81/41 and patient with lightheaded and blurred vision but states this is premorbid (>2 years). Patient limited in activity tolerance and gait/balance and at risk for falls and functional decline and would benefit from skilled PT to address impairments to progress towards PLOF. Once medically safe to d/c and appropriate functionally, plan to return home and resume HH services.     Plan    Recommend Physical Therapy 3 times per week until therapy goals are met for the following treatments:  Community Re-integration, Gait Training, Neuro Re-Education / Balance, Prosthetics Training, Stair Training, Therapeutic Activities and Therapeutic Exercises    DC Equipment Recommendations:  None  Discharge Recommendations:  Recommend home health for continued physical therapy services       Subjective  Pleasant and agreeable to PT eval reporting MOD pain in L hand. A&Ox4.      Objective       05/14/21 1006   Total Time Spent   Total Time Spent (Mins) 18   Charge Group   PT Evaluation PT Evaluation Mod   Initial Contact Note    Initial Contact Note Order Received and Verified, Physical Therapy Evaluation in Progress with Full Report to Follow.   Precautions   Precautions Fall Risk   Comments R BKA, Low BP (81/41 sitting EOB)   Vitals   Patient BP Position Sitting   Blood Pressure  (!) 81/41   Vitals Comments Light dizziness, blurred vision but  pnt reports this is baseline   Pain 0 - 10 Group   Therapist Pain Assessment Post Activity Pain Same as Prior to Activity;5  (L hand pain)   Non Verbal Descriptors   Non Verbal Scale  Calm   Prior Living Situation   Prior Services Skilled Home Health Services   Housing / Facility 1 Story House   Equipment Owned Front-Wheel Walker;Wheelchair  (RLE prosthesis)   Lives with - Patient's Self Care Capacity Spouse;Adult Children  (Spouse and son work during day, but able to care for self)   Comments HHPT during the week   Prior Level of Functional Mobility   Bed Mobility Independent   Transfer Status Independent   Ambulation Independent  (per pnt report)   Distance Ambulation (Feet) 30  (with FWW)   Assistive Devices Used Front-Wheel Walker   Wheelchair Independent   Stairs Required Assist   History of Falls   History of Falls No   Cognition    Cognition / Consciousness WDL   Level of Consciousness Alert   Active ROM Lower Body    Active ROM Lower Body  WDL   Strength Lower Body   Lower Body Strength  WDL   Sensation Lower Body   Lower Extremity Sensation   WDL   Lower Body Muscle Tone   Lower Body Muscle Tone  WDL   Coordination Lower Body    Coordination Lower Body  WDL   Vision   Low Vision   (reports poor vision)   Balance Assessment   Sitting Balance (Static) Normal   Sitting Balance (Dynamic) Good   Standing Balance (Static) Fair +   Standing Balance (Dynamic) Fair   Weight Shift Sitting Normal   Weight Shift Standing Fair   Comments FWW for UE support, no RLE prosthesis used   Gait Analysis   Gait Level Of Assist Supervised   Assistive Device Front Wheel Walker   Distance (Feet) 5  (2 hops)   # of Times Distance was Traveled 1   Deviation No deviation   Bed Mobility    Supine to Sit Supervised   Sit to Supine Supervised   Scooting Supervised   Rolling Supervised   Functional Mobility   Sit to Stand Supervised   Transfer Method Stand Step   Mobility EOB, in room   How much difficulty does the patient currently  have...   Turning over in bed (including adjusting bedclothes, sheets and blankets)? 4   Sitting down on and standing up from a chair with arms (e.g., wheelchair, bedside commode, etc.) 3   Moving from lying on back to sitting on the side of the bed? 4   How much help from another person does the patient currently need...   Moving to and from a bed to a chair (including a wheelchair)? 3   Need to walk in a hospital room? 3   Climbing 3-5 steps with a railing? 2   6 clicks Mobility Score 19   Activity Tolerance   Sitting Edge of Bed 30 mins   Patient / Family Goals    Patient / Family Goal #1 heal and go home   Short Term Goals    Short Term Goal # 1 Pt will demo transfers MOD IND in 6 visits to improve functional IND   Short Term Goal # 2 Pt will ambulate 50 feet with LRAD and SPV in 6 visits to improve functional IND   Short Term Goal # 3 Pt will state signs/symptoms of [place] in 6 visits to prevent increased fall risk   Education Group   Education Provided Role of Physical Therapist   Role of Physical Therapist Patient Response Patient;Acceptance;Explanation;Verbal Demonstration   Problem List    Problems Pain;Impaired Ambulation;Impaired Balance;Impaired Coordination;Decreased Activity Tolerance   Anticipated Discharge Equipment and Recommendations   DC Equipment Recommendations None   Discharge Recommendations Recommend home health for continued physical therapy services   Interdisciplinary Plan of Care Collaboration   IDT Collaboration with  Nursing   Patient Position at End of Therapy Seated;Edge of Bed;Call Light within Reach;Tray Table within Reach;Phone within Reach;Bed Alarm On   Collaboration Comments RN Updated   Session Information   Date / Session Number  5/14: 1(1/3, 5/20)

## 2021-05-14 NOTE — PROGRESS NOTES
Hospitalist notified about pt's widening pulse pressure with BP of 102/27. Pt is asymptomatic. Hospitalist also notified that pt is reporting persistent 10/10 pain in his left hand. Hospitalist said only tylenol is ok to give at this time due to pt's diagnoses. Orders given to monitor patient.   - C/w lasix 20   - Will add additional diuresis as clinically indicated

## 2021-05-14 NOTE — PROGRESS NOTES
[facility] Medicine Daily Progress Note    Date of Service  5/14/2021    Chief Complaint  75 y.o. male admitted 5/10/2021 with   Chief Complaint   Patient presents with   • Shortness of Breath     Pt reports incureased use of accessory muscles, O2 at home >90%.         Hospital Course  75 y.o. male with history of ESRD on hemodialysis TTS, A. fib on eliquis, and diabetes and PAD, who presented 5/10/2021 for evaluation of 4 days of shortness of breath and cough described as trouble clearing his throat but ultimately nonproductive.  He admits a 5-minute episode of dull left-sided nonradiating 3-5 intensity chest pain which resolved spontaneously without intervention it was associated and palpitations with nausea or vomiting.    BNP > 30,000, Trop 657>552  Echo: EF 30%, global hypokinesis. Cardiology plans [facility] 5/13  Nephrology consulted for HD  Get procal normal, blood culture neg to date  CXR personally reviewed: Left lung base infiltrate or atelectasis with small left pleural effusion;    Patient underwent [facility] 5/13 showing calcified ostial LAD and significant. stenosis is distal RCA at the ostium of the posterior descending artery.  PTCA of the ostial LAD was performed but non-dilatable with due to calcification. Considering high risk complex reattempt.     Severe PAD with ischemic left hand: Followed by Dr. Honeycutt, who is consulted here.     Cardiology thinks sob might be due to inadequate hemodialysis because of access issue. LAD lesion would require arthrectomy, but need to correct hemodialysis access issue first with     Patient was noted to have borderline low bp. On Midodrine 10mg tid. Has been given IVF overnight (5/13). Given his low EF, ESRD and PAD on arms, Bp measurement on BUE is not reliable. RN notified to check Bp in LLE for consistency and avoid IVF.     PT/OT    Interval Problem Update  Patient was seen at the bedside. He is noted on NS 125cc/hr. Denies dizziness or drowsiness. No chest pain, sob.    Discussed RN, cardiology and nephrology. Discontinue NS now. Avoid excess IVF given CHF and ESRD.   Vascular consulted for LUE cyanosis and fistular issue.  .  All Data, Medication data reviewed.  Case discussed with nursing, CM,  nurse, pharmacy in IDT rounds.     Consultants/Specialty  Cardiology  Nephrology  Vascular     Code Status  DNAR/DNI    Disposition  TBD    Review of Systems  Review of Systems   Constitutional: Negative for chills and fever.   HENT: Negative for ear discharge.    Eyes: Negative for blurred vision.   Respiratory: Positive for cough and shortness of breath. Negative for hemoptysis.    Cardiovascular: Positive for chest pain. Negative for palpitations.   Gastrointestinal: Negative for nausea and vomiting.   Genitourinary: Negative for dysuria.   Musculoskeletal: Negative for myalgias.   Skin: Negative for rash.   Neurological: Positive for dizziness, tingling (L hand and fingers) and weakness. Negative for focal weakness.   Endo/Heme/Allergies: Does not bruise/bleed easily.   Psychiatric/Behavioral: Negative for depression.   All other systems reviewed and are negative.       Physical Exam  Temp:  [35.6 °C (96.1 °F)-37 °C (98.6 °F)] 35.9 °C (96.7 °F)  Pulse:  [52-94] 52  Resp:  [18] 18  BP: ()/(21-65) 91/65  SpO2:  [97 %-100 %] 100 %    Physical Exam  Vitals and nursing note reviewed.   Constitutional:       General: He is not in acute distress.     Appearance: Normal appearance. He is ill-appearing.   HENT:      Head: Normocephalic and atraumatic.      Mouth/Throat:      Pharynx: Oropharynx is clear.   Eyes:      Pupils: Pupils are equal, round, and reactive to light.   Cardiovascular:      Rate and Rhythm: Normal rate. Rhythm irregular.   Pulmonary:      Effort: Pulmonary effort is normal. No respiratory distress.      Breath sounds: Normal breath sounds. No wheezing.   Abdominal:      General: Abdomen is flat. Bowel sounds are normal.      Palpations: Abdomen is soft.    Musculoskeletal:         General: No swelling or tenderness. Normal range of motion.      Comments: R BKA, L great toe amputation  Fistula to the L upper ext  L fingers blue, cool to touch   Skin:     General: Skin is warm and dry.   Neurological:      General: No focal deficit present.      Mental Status: He is alert and oriented to person, place, and time.   Psychiatric:         Mood and Affect: Mood normal.         Behavior: Behavior normal.         Fluids    Intake/Output Summary (Last 24 hours) at 5/14/2021 1044  Last data filed at 5/13/2021 1615  Gross per 24 hour   Intake 500 ml   Output 3500 ml   Net -3000 ml       Laboratory  Recent Labs     05/12/21  0339 05/12/21  1007 05/14/21  0545   WBC 7.5 5.6 5.7   RBC 2.85* 2.67* 2.59*   HEMOGLOBIN 9.5* 9.1* 8.9*   HEMATOCRIT 29.3* 27.0* 26.7*   .8* 101.1* 103.1*   MCH 33.3* 34.1* 34.4*   MCHC 32.4* 33.7 33.3*   RDW 56.6* 55.5* 57.3*   PLATELETCT 210 223 215   MPV 9.6 9.7 9.4     Recent Labs     05/12/21  0339 05/12/21  1007 05/14/21  0545   SODIUM 136 136 137   POTASSIUM 5.6* 4.5 4.8   CHLORIDE 97 97 99   CO2 19* 28 26   GLUCOSE 117* 171* 92   BUN 28* 30* 24*   CREATININE 5.09* 5.13* 4.31*   CALCIUM 10.1 9.5 9.2                   Imaging  US-HEMODIALYSIS GRAFT DUPLEX COMP UPPER EXTREMITY   Final Result      EC-ECHOCARDIOGRAM COMPLETE W/O CONT   Final Result      DX-CHEST-PORTABLE (1 VIEW)   Final Result         1.  Left lung base infiltrate or atelectasis with small left pleural effusion   2.  Cardiomegaly   3.  Perihilar interstitial prominence and bronchial wall cuffing, appearance suggests changes of underlying bronchial inflammation, consider bronchitis.      CL-LEFT HEART CATHETERIZATION WITH POSSIBLE INTERVENTION    (Results Pending)        Assessment/Plan  * Acute on chronic systolic congestive heart failure (HCC)  Assessment & Plan  Echo EF 30%, global hypokinesis, which has dropped from 55% in 11/2020  [facility] by cardiology 5/13:  calcified ostial LAD  and significant. stenosis is distal RCA at the ostium of the posterior descending artery.  PTCA of the ostial LAD was performed but non-dilatable with due to calcification.    I discussed with cardiology Dr. Finch. Due to LAD lesion, Considering high risk complex reattempt. But sob likely sec to inadequate hemodialysis due to access issue. Will have vascular evaluation and correct access issue first, then consider complex high risk asstempt later if in line with his GOC    On statin, metoprolol. NO ACEI/ARB due to ESRD  I&O  Daily weight    Advance care planning  Assessment & Plan  Code status discussed with palliative care team, appreciated  Patient requests DNR/DNI. POLST form signed  However due to his high risk LAD and RCA lesion requiring reattempt procedure, need continue discussion GOC. He now wishes to pursue aggressive Rx.    Peripheral vascular disease (HCC)- (present on admission)  Assessment & Plan  L fingers bluish discoloration and tool to touch, reports numbing and tingling  Followed by vascular Dr. Honeycutt. Dr. Honeycutt is consulted     Shortness of breath  Assessment & Plan  Multifactorial secondary to pulmonary edema vs. Bronchitis vs. Heart failure  ProBNP >03311  CXR personally reviewed: Left lung base infiltrate or atelectasis with small left pleural effusion; Perihilar interstitial prominence and bronchial wall cuffing, appearance suggests changes of underlying bronchial inflammation, consider bronchitis.  Echo: EF 30%, global hypokinesis    Check procalcitonin normal, blood culture neg to date  Nephrology for dialysis  Cardiology plans [facility] 5/13    Hypotension  Assessment & Plan  Echo: EF 30%, which has decreased from 55% in 11/2020  Given his EF and ESRD on HD, avoid IVF resuscitation if possible. His BP reading on BUE may be not reliable. May check Bp at LLE, Updated to RN  Midodrine   Cont tele monitoring  Avoid IV fluid administration unless patient is symptomatic       End stage renal  disease on dialysis (HCC)- (present on admission)  Assessment & Plan  On TTS schedule  Nephrology consulted and following    Atrial fibrillation with RVR ([facility])  Assessment & Plan  Rate controlled   Cont eliquis and coreg      Type 2 diabetes mellitus with kidney complication, with long-term current use of insulin ([facility])- (present on admission)  Assessment & Plan  Regular insulin sliding scale, follow-up A1c 5.7  On insulin glargine  accucheck with hypoglycemic protocol    Amputated great toe of left foot ([facility])  Assessment & Plan  Sec to diabetic ulcer    Hyperkalemia  Assessment & Plan  Without peak T waves, dialysis TTS  Nephrology following    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)- (present on admission)  Assessment & Plan  Hx of   Sec to PAD and diabetic ulcer    Dyslipidemia- (present on admission)  Assessment & Plan  Cont statin    Anemia- (present on admission)  Assessment & Plan  Complicated by end-stage renal failure on dialysis.   No sign of bleeding  Transfuse if Hb<7   Follow-up anemia labs, c/w chronic disease anemia    Hyperthyroidism- (present on admission)  Assessment & Plan  Follow-up TSH normal  Continue Tapazole       VTE prophylaxis: eliquis

## 2021-05-14 NOTE — PROGRESS NOTES
According to previous hospitalist notes pt wants to be DNAR/DNI and has a signed POLST. Dr. Burgos was notified and is at bedside to confirm pt code status as DNAR/DNI. Pt AOx4 and confirmed DNAR/DNI is correct code status. Dr Burgos also aware of pt's blood pressure of 90/27, and hand pain. Toradol prescribed.

## 2021-05-14 NOTE — PROGRESS NOTES
Received report from NOC RN. Assumed care of patient at 0700. Patient A&Ox 4, speaking in full sentences, follows commands and responds appropriately to questions. On room air, no signs of respiratory distress. Respirations are even and unlabored. Patient states 6/10 pain at this time. POC discussed and agreed upon with patient. Call light and belongings within reach. Bed in lowest locked position. Upper side rails raised. Fall risk precautions in place. Hourly rounding. Will continue to monitor blood pressure.

## 2021-05-14 NOTE — PROGRESS NOTES
"[facility] -  PROGRESS NOTE               Author: Mario Hunter M.D. Date & Time: 5/14/2021  8:47 AM     HPI:  75-year-old male with history of ESRD, on TTS HD, A. fib on Eliquis, diabetes mellitus, peripheral vascular disease presented with complaints of increasing shortness of breath along with occasional cough and mucus production for past couple of weeks.  No fever/chills.  Patient is compliant with hemodialysis sessions.  Diagnostic work-up significant for BNP in 35,000s, troponin of 657--> 552, EKG showing atrial fibrillation with RVR.  Chest x-ray: Small left pleural effusion and perihilar infiltrates.  K5.8 this AM.  Nephrology consulted for inpatient hemodialysis.     DAILY NEPHROLOGY SUMMARY:  5/12: Underwent HD yesterday.  Hypotensive this a.m with dizziness,. received 1 L fluid bolus.  Echo: LVEF 30%, global hypokinesis.  No chest pain  5/13: Pending cath this AM, Fatigued but denies chest pain. Bps improved this AM  5/14: LHC with  LVEF 15- 20%, 80% ostial LAD in-stent restenosis and distal RCA disease. Will need consideration for complex high risk reattempt at ostial LAD intervention.  Started on IV fluids overnight for hypotension. Short of breath this AM. L. Hand painful with worsening cyanosis    PAST FAMILY HISTORY: Reviewed and Unchanged  SOCIAL HISTORY: Reviewed and Unchanged  CURRENT MEDICATIONS: Reviewed  IMAGING STUDIES: Reviewed    ROS  Reviewed, negative other than stated above    PHYSICAL EXAM  VS:  BP (!) 91/65   Pulse (!) 52 Comment: Rn notified   Simultaneous filing. User may not have seen previous data.  Temp 35.9 °C (96.7 °F) (Temporal) Comment: Simultaneous filing. User may not have seen previous data. Comment (Src): Simultaneous filing. User may not have seen previous data.  Resp 18 Comment: Simultaneous filing. User may not have seen previous data.  Ht 1.676 m (5' 6\")   Wt 75.2 kg (165 lb 12.6 oz)   SpO2 100% Comment: Simultaneous filing. User may " not have seen previous data.  BMI 26.76 kg/m²   GENERAL: no acute distress  CV: rhythm irregular, No edema  RESP: CTAB  GI: Soft  MSK: R BKA, L great toe amputation.  SKIN: violaceous L hand  NEURO: AOx3  PSYCH: Cooperative  ACCESS: LUE AVF    Fluids:  In: 500 [Dialysis:500]  Out: 3500     LABS:  Recent Results (from the past 24 hour(s))   POCT glucose device results    Collection Time: 21 12:53 PM   Result Value Ref Range    Glucose - Accu-Ck 83 65 - 99 mg/dL   EKG STAT    Collection Time: 21  1:42 PM   Result Value Ref Range    Report       [facility]    Test Date:  2021  Pt Name:    KLARISSA BRADSHAW              Department: [id number]  MRN:        [id number]                      Room:       [facility]  Gender:     Male                         Technician: JENNIFER  :        1945                   Requested By:KEILA MARTINEZ  Order #:    [id number]                    Reading MD: Joao Martin MD    Measurements  Intervals                                Axis  Rate:       76                           P:          42  NM:         210                          QRS:        -75  QRSD:       146                          T:          106  QT:         472  QTc:        531    Interpretive Statements  SINUS RHYTHM  RIGHT BUNDLE BRANCH BLOCK  Compared to ECG 05/10/2021 20:47:06  Atrial fibrillation no longer present  Ventricular premature complex(es) no longer present  Electronically Signed On 2021 14:20:19 PDT by Joao Martin MD     POCT glucose device results    Collection Time: 21  4:41 PM   Result Value Ref Range    Glucose - Accu-Ck 78 65 - 99 mg/dL   POCT glucose device results    Collection Time: 21  9:50 PM   Result Value Ref Range    Glucose - Accu-Ck 109 (H) 65 - 99 mg/dL   Basic Metabolic Panel (BMP)    Collection Time: 21  5:45 AM   Result Value Ref Range    Sodium 137 135 - 145 mmol/L    Potassium 4.8 3.6 - 5.5 mmol/L    Chloride 99 96 - 112 mmol/L    Co2 26 20 - 33 mmol/L     Glucose 92 65 - 99 mg/dL    Bun 24 (H) 8 - 22 mg/dL    Creatinine 4.31 (H) 0.50 - 1.40 mg/dL    Calcium 9.2 8.5 - 10.5 mg/dL    Anion Gap 12.0 7.0 - 16.0   CBC without differential    Collection Time: 05/14/21  5:45 AM   Result Value Ref Range    WBC 5.7 4.8 - 10.8 K/uL    RBC 2.59 (L) 4.70 - 6.10 M/uL    Hemoglobin 8.9 (L) 14.0 - 18.0 g/dL    Hematocrit 26.7 (L) 42.0 - 52.0 %    .1 (H) 81.4 - 97.8 fL    MCH 34.4 (H) 27.0 - 33.0 pg    MCHC 33.3 (L) 33.7 - 35.3 g/dL    RDW 57.3 (H) 35.9 - 50.0 fL    Platelet Count 215 164 - 446 K/uL    MPV 9.4 9.0 - 12.9 fL   ESTIMATED GFR    Collection Time: 05/14/21  5:45 AM   Result Value Ref Range    GFR If  16 (A) >60 mL/min/1.73 m 2    GFR If Non  13 (A) >60 mL/min/1.73 m 2       (click the triangle to expand results)      ASSESSMENT:  # ESRD: normally T/T/S via. AVF.   # Hypotension: cardigenic etiology. Not volume responsive.   # Cardiomyopathy: acute worsening. EF now 15-20%  # 80% ostial LAD in-stent restenosis:   # Steal syndrome: s/p DRI. Worsening apparent ischmia in setting of heart failure  # Anemia in CKD  # Atrial fibrillation  # Diabetes mellitus  # Hyperthyroidism    PLAN:  -Tentative plan for UF today and then normal HD tomorrow in an attempt to optimize volume status for possible cardiac intervention on Monday 5/17  -Avoid volume expansion going forward if possible  -ongoing GOC discussions, considering  complex high risk reattempt at ostial LAD intervention  -Vascular surgery aware of steal/ischemia. IF no improvement going forward may need catheter and ligation  -Dose all meds for ESRD  -Renal Diet  -Epogen with HD  -Continue calcitriol, phos binder    Thank you,    Mario Hunter

## 2021-05-14 NOTE — PROGRESS NOTES
Reason for consultation /admission : CHF    No chest pain  Does not appear dyspneic  Underwent cardiac catheterization yesterday found to have calcified ostial LAD and significant stenosis is distal RCA at the ostium of the posterior descending artery.  Her aortic pressure was around 100 systolic.    PTCA of the ostial LAD was performed but non-dilatable with due to calcification     Has been having pain and discoloration of his left hand was last seen 3 weeks since revision of his AV fistula    Has vascular study of the left arm yesterday which showed patent fistula was severe calcification of the left radial and ulnar artery  He thinks that has been receiving adequate dialysis    Review of systems;    General: No fever, chills, no weakness  HENT: No sore throat, no neck pain  Eyes: No blurred vision or double vision  Heart: No palpitation, no PND or orthopnea, no claudication, no leg swelling  + pain and discoloration left hand  BP highest in his left leg per RN  Lung: + non-productive cough, no hemoptysis  Abdomen: No abdominal pain, no nausea vomiting diarrhea or blood in stool  : No dysuria, no frequency or hesitancy, no hematuria  Musculoskeletal: No myalgia, no back pain, some joint pain  Hematology: No easy bruising  Skin: No rash or itching  Neurological: No headache, no new focal weakness or numbness  Psychological: Denies depression, anxiety or insomnia  All other review of systems are negative      Temp:  [35.6 °C (96.1 °F)-37 °C (98.6 °F)] 35.9 °C (96.7 °F)  Pulse:  [52-94] 52  Resp:  [18] 18  BP: ()/(21-65) 91/65  SpO2:  [97 %-100 %] 100 %  GENERAL not in acute distress, not dyspnic at rest  Head atraumatic, normocephalic  Eyes EOMI  ENT neck supple, no JVD, no carotid bruits or thyromegaly  Lung good expansion, distant sound, no rales or wheezing  Heart RRR, normal rate, no murmur,   no gallop or rub  AV fistula Lt forearm  Purplish discoloration Lt hand  Abd soft, no tenderness, mass or  bruits  Ext no edema  Skin no ecchymosis or petechiae  Musculoskeletal no deformity  Neuro grossly intact  Psych normal mood, normal affect    Monitor reviewed by me showed no significant ventricular or atrial dysrhythmias.    Labs: Hb 8.9 Cr 4.3, K+ 4.8    Assessment and plans    1. HFrEF acute on chronic, with severely reduced LV systolic function and two-vessel coronary artery disease.  Some of his symptom may be in part due to inadequate hemodialysis because of access issue.  He LAD lesion would require arthrectomy.  According to the interventionalist, this will be more safely performed with LV assist device. It appears chronic. We probably should first try to correct his hemodialysis access issue.  The procedure I believe probably could be arranged early next week or as an outpatient in the near future. He now wishes to pursue aggressive Rx.  I have contacted Dr. Honeycutt the patient vascular surgeon.  He kindly agreed to come and discussed with the patient about further management option.    Noninvasive blood pressure is borderline low although this could be spuriously reading from his peripheral arterial disease.  Intra-aortic pressure during catheterization yesterday appeared to be in the low 100 with sedation however. I would avoid IV fluid administration unless patient is symptomatic    2. CAD (2 vessel)  As above.  Continue statin and  DAPT    3. Severe PAD with ischemic left hand  Per primary team and vascular  Need pain control    4. ESRD  prob need new access.  Rx per nephrologist and primary    Will follow    Addendum    Spoke to Dr. Hunter who has been taking care of him for a year or so earlier. He informed me that they have been having difficulty removing fluid during dialysis due to hypotension.  Will arrange for PCI of LAD probably Monday. Prior stress MPI 11/2019 showed anterior infarct and some of his LV dysfunction may be from microvascular disease or DM cardiomyopathy, therefore  LV  dysfunction may not change substantially post PCI  especially in the immediate post PCI.      Please note that this dictation was created using voice recognition software. I have worked with consultants from the vendor as well as technical experts from [facility] to optimize the interface. I have made every reasonable attempt to correct obvious errors, but I expect that there are errors of grammar and possibly content I did not discover before finalizing the note

## 2021-05-14 NOTE — CARE PLAN
Problem: Safety  Goal: Will remain free from injury  Outcome: Progressing     Problem: Knowledge Deficit  Goal: Knowledge of disease process/condition, treatment plan, diagnostic tests, and medications will improve  Outcome: Progressing

## 2021-05-14 NOTE — FACE TO FACE
Face to Face Supporting Documentation - Home Health    The encounter with this patient was in whole or in part the primary reason for home health admission.    Date of encounter:   Patient:                    MRN:                       YOB: 2023  Luis Sepulveda  [id number]  1945     Home health to see patient for:  Skilled Nursing care for assessment, interventions & education    Skilled need for:  Exacerbation of Chronic Disease State CHF, ESRD    Skilled nursing interventions to include:  Comment: debility    Homebound status evidenced by:  Need the aid of supportive devices such as crutches, canes, wheelchairs or walkers. Leaving home requires a considerable and taxing effort. There is a normal inability to leave the home.    Community Physician to provide follow up care: Patito Edgar M.D.     Optional Interventions? No      I certify the face to face encounter for this home health care referral meets the CMS requirements and the encounter/clinical assessment with the patient was, in whole, or in part, for the medical condition(s) listed above, which is the primary reason for home health care. Based on my clinical findings: the service(s) are medically necessary, support the need for home health care, and the homebound criteria are met.  I certify that this patient has had a face to face encounter by myself.  Lucretia Villatoro M.D. - NPI: [id number]

## 2021-05-14 NOTE — PROGRESS NOTES
Assumed care of PT A&O 4. Pt resting in bed with no signs of labored breathing. On 2L O2. Tele monitor in place, cardiac rhythm being monitored. Call light within reach, bed in lowest position, upper bed rails up. Pt was updated on plan of care for the night.

## 2021-05-15 NOTE — PROGRESS NOTES
Hospital Medicine Daily Progress Note    Date of Service  5/15/2021    Chief Complaint  75 y.o. male admitted 5/10/2021 with   Chief Complaint   Patient presents with   • Shortness of Breath     Pt reports incureased use of accessory muscles, O2 at home >90%.         Hospital Course  75 y.o. male with history of ESRD on hemodialysis TTS, A. fib on eliquis, and diabetes and PAD, who presented 5/10/2021 for evaluation of 4 days of shortness of breath and cough.    BNP > 30,000, Trop 657>552  Echo: EF 30%, global hypokinesis. Cardiology plans [facility] 5/13  Procal normal, blood culture neg to date  CXR personally reviewed: Left lung base infiltrate or atelectasis with small left pleural effusion;    Cardiology: Patient underwent [facility] 5/13 showing calcified ostial LAD and significant. stenosis is distal RCA at the ostium of the posterior descending artery. PTCA of the ostial LAD was performed but non-dilatable with due to calcification. Considering high risk complex reattempt.     Vascular: ESRD on HD via a left brachiocephalic AVF.  Developed steal syndrome.  Underwent successful DRIL recently. His arterial system was found to be diffusively calcified throughout. L fingers cyanosis. To improve circulation, has to ligate the left AVF and patient will become catheter dependent. And CHF contributes to decreased flow in L hand. Patient is still deciding.     Nephrology: HD TTS.     Patient was noted to have borderline low bp. On Midodrine 10mg tid. Given to his significant PAD, Bp reading on BUE unreliable. RN notified to check Bp in LLE for consistency and avoid IVF unless patient is symptomatic    PT/OT: [facility]    Interval Problem Update  Overnight he was noted to have fluctuating Bp reading  systolic on LLE, asymptomatic. Patient states he would like to get his heart function improving, rather than proceeding to ligation procedure by Dr. Honeycutt.    Denies dizziness or drowsiness. No chest pain, sob.     All Data,  Medication data reviewed.  Case discussed with nursing, CM,  nurse, pharmacy in IDT rounds.     Consultants/Specialty  Cardiology  Nephrology  Vascular     Code Status  DNAR/DNI    Disposition  TBD    Review of Systems  Review of Systems   Constitutional: Negative for chills and fever.   HENT: Negative for ear discharge.    Eyes: Negative for blurred vision.   Respiratory: Positive for cough and shortness of breath. Negative for hemoptysis.    Cardiovascular: Negative for chest pain and palpitations.   Gastrointestinal: Negative for nausea and vomiting.   Genitourinary: Negative for dysuria.   Musculoskeletal: Negative for myalgias.   Skin: Negative for rash.   Neurological: Positive for dizziness, tingling (L hand and fingers) and weakness. Negative for focal weakness.   Endo/Heme/Allergies: Does not bruise/bleed easily.   Psychiatric/Behavioral: Negative for depression.   All other systems reviewed and are negative.       Physical Exam  Temp:  [35.9 °C (96.7 °F)-37.1 °C (98.7 °F)] 36.1 °C (96.9 °F)  Pulse:  [75-88] 76  Resp:  [16-19] 18  BP: ()/(24-99) 108/70  SpO2:  [90 %-99 %] 96 %    Physical Exam  Vitals and nursing note reviewed.   Constitutional:       General: He is not in acute distress.     Appearance: Normal appearance. He is ill-appearing.   HENT:      Head: Normocephalic and atraumatic.      Mouth/Throat:      Pharynx: Oropharynx is clear.   Eyes:      Pupils: Pupils are equal, round, and reactive to light.   Cardiovascular:      Rate and Rhythm: Normal rate. Rhythm irregular.   Pulmonary:      Effort: Pulmonary effort is normal. No respiratory distress.      Breath sounds: Normal breath sounds. No wheezing.   Abdominal:      General: Abdomen is flat. Bowel sounds are normal.      Palpations: Abdomen is soft.   Musculoskeletal:         General: No swelling or tenderness. Normal range of motion.      Comments: R BKA, L great toe amputation  Fistula to the L upper ext  L fingers cyanosis,  cool and tender to touch   Skin:     General: Skin is warm and dry.   Neurological:      General: No focal deficit present.      Mental Status: He is alert and oriented to person, place, and time.   Psychiatric:         Mood and Affect: Mood normal.         Behavior: Behavior normal.         Fluids    Intake/Output Summary (Last 24 hours) at 5/15/2021 1122  Last data filed at 5/15/2021 0400  Gross per 24 hour   Intake 980 ml   Output 2350 ml   Net -1370 ml       Laboratory  Recent Labs     05/14/21  0545 05/15/21  0145   WBC 5.7 7.4   RBC 2.59* 2.77*   HEMOGLOBIN 8.9* 9.5*   HEMATOCRIT 26.7* 28.5*   .1* 102.9*   MCH 34.4* 34.3*   MCHC 33.3* 33.3*   RDW 57.3* 57.8*   PLATELETCT 215 243   MPV 9.4 9.5     Recent Labs     05/14/21  0545 05/15/21  0145   SODIUM 137 136   POTASSIUM 4.8 5.7*   CHLORIDE 99 97   CO2 26 25   GLUCOSE 92 97   BUN 24* 37*   CREATININE 4.31* 5.74*   CALCIUM 9.2 10.2                   Imaging  US-HEMODIALYSIS GRAFT DUPLEX COMP UPPER EXTREMITY   Final Result      EC-ECHOCARDIOGRAM COMPLETE W/O CONT   Final Result      DX-CHEST-PORTABLE (1 VIEW)   Final Result         1.  Left lung base infiltrate or atelectasis with small left pleural effusion   2.  Cardiomegaly   3.  Perihilar interstitial prominence and bronchial wall cuffing, appearance suggests changes of underlying bronchial inflammation, consider bronchitis.      CL-LEFT HEART CATHETERIZATION WITH POSSIBLE INTERVENTION    (Results Pending)        Assessment/Plan  * Acute on chronic systolic congestive heart failure (HCC)  Assessment & Plan  Echo EF 30%, global hypokinesis, which has dropped from 55% in 11/2020  [facility] by cardiology 5/13:  calcified ostial LAD and significant. stenosis is distal RCA at the ostium of the posterior descending artery.  PTCA of the ostial LAD was performed but non-dilatable with due to calcification.    Cardiology following, ?high complex reattempt    On statin, metoprolol. NO ACEI/ARB due to ESRD  I&O  Daily  weight    Advance care planning  Assessment & Plan  Code status discussed with palliative care team, appreciated  Patient requests DNR/DNI. POLST form signed  However due to his high risk LAD and RCA lesion requiring reattempt procedure, need continue discussion GOC. He now wishes to pursue aggressive Rx.    Peripheral vascular disease (HCC)- (present on admission)  Assessment & Plan  L fingers cyanosis and tool to touch, reports numbing and tingling  Followed by vascular Dr. Honeycutt. Dr. Honeycutt is consulted: ligation of L AVF? --patient is still thinking about it    Shortness of breath  Assessment & Plan  Multifactorial secondary to fluid overload from ESRD inadequate dialysis vs. CHF  ProBNP >38054  CXR personally reviewed: Left lung base infiltrate or atelectasis with small left pleural effusion; Perihilar interstitial prominence and bronchial wall cuffing, appearance suggests changes of underlying bronchial inflammation, consider bronchitis.  Echo: EF 30%, global hypokinesis  S/p C 5/13    Hypotension  Assessment & Plan  Echo: EF 30%, which has decreased from 55% in 11/2020  Extensive PAD: His BP reading on BUE may be not reliable. Check Bp at LLE for consistent reading, updated to RN  Midodrine   Cont tele monitoring  Avoid IV fluid administration unless patient is symptomatic (given hx of CHF and ESRD)       End stage renal disease on dialysis ([facility])- (present on admission)  Assessment & Plan  On TTS schedule  Nephrology consulted and following    Atrial fibrillation with RVR ([facility])  Assessment & Plan  Rate controlled   Cont eliquis and coreg      Type 2 diabetes mellitus with kidney complication, with long-term current use of insulin ([facility])- (present on admission)  Assessment & Plan  Regular insulin sliding scale, follow-up A1c 5.7  On insulin glargine  accucheck with hypoglycemic protocol    Amputated great toe of left foot ([facility])  Assessment & Plan  Sec to diabetic ulcer    Hyperkalemia  Assessment &  Plan  Without peak T waves, dialysis TTS  Nephrology following    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)- (present on admission)  Assessment & Plan  Hx of   Sec to PAD and diabetic ulcer    Dyslipidemia- (present on admission)  Assessment & Plan  Cont statin    Anemia- (present on admission)  Assessment & Plan  Complicated by end-stage renal failure on dialysis.   No sign of bleeding  Transfuse if Hb<7   Follow-up anemia labs, c/w chronic disease anemia    Hyperthyroidism- (present on admission)  Assessment & Plan  Follow-up TSH normal  Continue Tapazole       VTE prophylaxis: eliquis

## 2021-05-15 NOTE — DISCHARGE PLANNING
Received Choice form at 5229  Agency/Facility Name: [facility]  Referral sent per Choice form @ [phone]

## 2021-05-15 NOTE — CARE PLAN
Problem: Fall Risk  Goal: Patient will remain free from falls  Outcome: Progressing     Problem: Knowledge Deficit - Standard  Goal: Patient and family/care givers will demonstrate understanding of plan of care, disease process/condition, diagnostic tests and medications  Outcome: Progressing

## 2021-05-15 NOTE — PROGRESS NOTES
Received report from NOC RN. Assumed care of patient at 0700. Patient A&Ox 4, speaking in full sentences, follows commands and responds appropriately to questions. On room air, no signs of respiratory distress. Respirations are even and unlabored. Patient states 3/10 pain at this time. POC discussed and agreed upon with patient. Call light and belongings within reach. Bed in lowest locked position. Upper side rails raised. Bed alarm on. Fall risk precautions in place. Hourly rounding. Will continue to monitor blood pressure.

## 2021-05-15 NOTE — PROGRESS NOTES
Hosp notified of pt's blood pressure of 60s/20s. 250 cc bolus started. BP rechecked and was 130s/70s. Bolus was stopped. Pt's blood pressures were checked q5 minutes and fluctuated back and forth between from 60s/20s to 130s/70s multiple times within a 30 minute period. All blood pressures were taken on the left leg. It was determined by hospitalist and RNs that blood pressure readings are unreliable due to pt's diagnosis, which is consistent with provider notes from 5/14. Pt has bounding femoral pulses, and 2+ radial pulses. Pt has no change in mentation and is not reporting dizziness at this time.

## 2021-05-15 NOTE — PROGRESS NOTES
"[facility] -  PROGRESS NOTE               Author: TABATHA Becker Date & Time: 5/15/2021  11:22 AM     HPI:  75-year-old male with history of ESRD, on TTS HD, A. fib on Eliquis, diabetes mellitus, peripheral vascular disease presented with complaints of increasing shortness of breath along with occasional cough and mucus production for past couple of weeks.  No fever/chills.  Patient is compliant with hemodialysis sessions.  Diagnostic work-up significant for BNP in 35,000s, troponin of 657--> 552, EKG showing atrial fibrillation with RVR.  Chest x-ray: Small left pleural effusion and perihilar infiltrates.  K5.8 this AM.  Nephrology consulted for inpatient hemodialysis.     DAILY NEPHROLOGY SUMMARY:  5/12: Underwent HD yesterday.  Hypotensive this a.m with dizziness,. received 1 L fluid bolus.  Echo: LVEF 30%, global hypokinesis.  No chest pain  5/13: Pending cath this AM, Fatigued but denies chest pain. Bps improved this AM  5/14: LHC with  LVEF 15- 20%, 80% ostial LAD in-stent restenosis and distal RCA disease. Will need consideration for complex high risk reattempt at ostial LAD intervention.  Started on IV fluids overnight for hypotension. Short of breath this AM. L. Hand painful with worsening cyanosis  5/15: Symptomatic hypotension req IVF. C/o sob on oxygen.  C/o pain, tingling, coldness in left hand. Long discussion regarding access options. Patient verbalizes understandings. \"Wishes someone would make the decision\". K 5.7.  350 mL UOP recorded for last 24 hrs.     PAST FAMILY HISTORY: Reviewed and Unchanged  SOCIAL HISTORY: Reviewed and Unchanged  CURRENT MEDICATIONS: Reviewed  IMAGING STUDIES: Reviewed    ROS  Reviewed, negative other than stated above    PHYSICAL EXAM  VS:  /70   Pulse 76   Temp 36.1 °C (96.9 °F) (Temporal)   Resp 18   Ht 1.676 m (5' 6\")   Wt 75.2 kg (165 lb 12.6 oz)   SpO2 96%   BMI 26.76 kg/m²   GENERAL: no acute distress  CV: rhythm " irregular, No edema  RESP: CTAB  GI: Soft  MSK: R BKA, L great toe amputation.  SKIN: violaceous L hand. Cap Refill > 3 sec. Dusky/cyanotic. No ulcerations.  NEURO: AOx3  PSYCH: Cooperative  ACCESS: LUE AVF oozing. Failed R avf,no bruit or thrill    Fluids:  In: 980 [P.O.:480; Dialysis:500]  Out: 2350     LABS:  Recent Results (from the past 24 hour(s))   POCT glucose device results    Collection Time: 05/14/21 12:29 PM   Result Value Ref Range    Glucose - Accu-Ck 82 65 - 99 mg/dL   POCT glucose device results    Collection Time: 05/14/21  6:32 PM   Result Value Ref Range    Glucose - Accu-Ck 55 (L) 65 - 99 mg/dL   POCT glucose device results    Collection Time: 05/14/21  7:09 PM   Result Value Ref Range    Glucose - Accu-Ck 69 65 - 99 mg/dL   POCT glucose device results    Collection Time: 05/14/21 10:12 PM   Result Value Ref Range    Glucose - Accu-Ck 132 (H) 65 - 99 mg/dL   Basic Metabolic Panel    Collection Time: 05/15/21  1:45 AM   Result Value Ref Range    Sodium 136 135 - 145 mmol/L    Potassium 5.7 (H) 3.6 - 5.5 mmol/L    Chloride 97 96 - 112 mmol/L    Co2 25 20 - 33 mmol/L    Glucose 97 65 - 99 mg/dL    Bun 37 (H) 8 - 22 mg/dL    Creatinine 5.74 (HH) 0.50 - 1.40 mg/dL    Calcium 10.2 8.5 - 10.5 mg/dL    Anion Gap 14.0 7.0 - 16.0   CBC WITH DIFFERENTIAL    Collection Time: 05/15/21  1:45 AM   Result Value Ref Range    WBC 7.4 4.8 - 10.8 K/uL    RBC 2.77 (L) 4.70 - 6.10 M/uL    Hemoglobin 9.5 (L) 14.0 - 18.0 g/dL    Hematocrit 28.5 (L) 42.0 - 52.0 %    .9 (H) 81.4 - 97.8 fL    MCH 34.3 (H) 27.0 - 33.0 pg    MCHC 33.3 (L) 33.7 - 35.3 g/dL    RDW 57.8 (H) 35.9 - 50.0 fL    Platelet Count 243 164 - 446 K/uL    MPV 9.5 9.0 - 12.9 fL    Neutrophils-Polys 70.20 44.00 - 72.00 %    Lymphocytes 14.80 (L) 22.00 - 41.00 %    Monocytes 13.80 (H) 0.00 - 13.40 %    Eosinophils 0.10 0.00 - 6.90 %    Basophils 0.70 0.00 - 1.80 %    Immature Granulocytes 0.40 0.00 - 0.90 %    Nucleated RBC 0.00 /100 WBC     Neutrophils (Absolute) 5.18 1.82 - 7.42 K/uL    Lymphs (Absolute) 1.09 1.00 - 4.80 K/uL    Monos (Absolute) 1.02 (H) 0.00 - 0.85 K/uL    Eos (Absolute) 0.01 0.00 - 0.51 K/uL    Baso (Absolute) 0.05 0.00 - 0.12 K/uL    Immature Granulocytes (abs) 0.03 0.00 - 0.11 K/uL    NRBC (Absolute) 0.00 K/uL   ESTIMATED GFR    Collection Time: 05/15/21  1:45 AM   Result Value Ref Range    GFR If  12 (A) >60 mL/min/1.73 m 2    GFR If Non African American 10 (A) >60 mL/min/1.73 m 2   POCT glucose device results    Collection Time: 05/15/21  5:34 AM   Result Value Ref Range    Glucose - Accu-Ck 122 (H) 65 - 99 mg/dL   POCT glucose device results    Collection Time: 05/15/21  8:41 AM   Result Value Ref Range    Glucose - Accu-Ck 110 (H) 65 - 99 mg/dL       (click the triangle to expand results)      ASSESSMENT:  # ESRD: normally T/T/S via. AVF.   # Hypotension: cardigenic etiology. Not volume responsive.   # Cardiomyopathy: acute worsening. EF now 15-20%  # 80% ostial LAD in-stent restenosis:   -Unable to bypass further  -Ligate L AVF,will become cathter dependent  # Steal syndrome: s/p DRI. Worsening apparent ischmia in setting of heart failure  # Anemia in CKD  # Atrial fibrillation  # Diabetes mellitus  # Hyperthyroidism  # Hyperkalemia  -Correct with HD  -Low K diet    PLAN:  -HD today (SAT) in an attempt to optimize volume status for possible cardiac intervention on Monday 5/17  -Avoid volume expansion going forward if possible  -ongoing GOC discussions, considering  complex high risk reattempt at ostial LAD intervention  -Vascular surgery aware of steal/ischemia. IF no improvement going forward may need catheter and ligation  -Dose all meds for ESRD  -Renal Diet/Low K Diet  -Epogen with HD  -Hold Apixaban and Hep with HD tx  -Continue calcitriol, phos binder    Thank you,

## 2021-05-15 NOTE — CARE PLAN
Problem: Fall Risk  Goal: Patient will remain free from falls  Note: Bed alarm on. Room clear of clutter. Encouraged to use call light when assistance needed. Hourly rounding in place. Room close to nursing station.     Problem: Knowledge Deficit - Standard  Goal: Patient and family/care givers will demonstrate understanding of plan of care, disease process/condition, diagnostic tests and medications  Note: Understanding of interventions assessed and gaps in understanding addressed.   The patient is Watcher - Medium risk of patient condition declining or worsening         Progress made toward(s) clinical / shift goals:  progressing as expected    Patient is not progressing towards the following goals:

## 2021-05-15 NOTE — DISCHARGE PLANNING
Hospital Care Management Discharge Planning       Anticipated Discharge Disposition:   · Home with HH     Action:   · This RN CM met with the patient to discuss HH choice   · Pt chose to continue services with [facility]  · Verbal consent received to send referral  · Choice form faxed to JEREMY Garcia at [phone]  · Fax receipt received      Barriers to Discharge:   · Medical clearance   · HH acceptance      Plan:   · Hospital Care Management Team to continue to provide support services and assistance with discharge planning as needed.

## 2021-05-15 NOTE — PROGRESS NOTES
[facility]iology Follow-up Note    Date of Service:    5/15/2021      Name:   Luis Sepulveda   YOB: 1945  Age:   75 y.o.  male   MRN:   [id number]      Chief Complaint: shortness of breath     Primary cardiologist:  Dr. Osborn    HPI:  Mr Sepulveda is a 76 y/o fellow with PAF on low dose Eliquis, CAD, ESRD on HD T, Th, Sa who presented to [facility] on 5/10/21 with shortness of breath.  Apparently lost quite a bit of mass lately and thought to have hypervolemia.    Interim Events:  5/15/2021: No overnight cardiac events. Tele monitoring shows SR. VSS; RA; Daily weight not compelted; discussed with nursing. Labs reviewed; K- 5.7, BUN/Crt- 37/5.74. HD today, [facility] tentatively planned for Monday. GOC discussed; patient would like to proceed.     ROS  Constitutional:  denies fatigue. Dizziness improved  Respiratory: + shortness of breath, -orhtopnea + productive cough.  Cardiovascular:  Denies chest pain.  no lower extremity edema.  Denies orthopnea or PND.  : anuric  GI:  Denies nausea/vomiting.  No abdominal distention.  Neuro:  Denies dizziness, syncope.  Hem/lymph: Denies easy bleeding/bruising.  Ext:   L index finger blue, cool, numbness, tingling at times.    All other review of systems reviewed and negative.    Past medical, surgical, social, and family history reviewed and unchanged from admission except as noted in HPI.    Medications: Reviewed in MAR  Current Facility-Administered Medications   Medication Dose Frequency Provider Last Rate Last Admin   • traMADol (ULTRAM) 50 MG tablet 50 mg  50 mg Q8HRS PRN Romero Burgos M.D.   50 mg at 05/15/21 0035   • HYDROmorphone (Dilaudid) injection 0.5 mg  0.5 mg Q6HRS PRN Lucretia Villatoro M.D.       • oxyCODONE immediate-release (ROXICODONE) tablet 5 mg  5 mg Once Romero Burgos M.D.       • aspirin EC (ECOTRIN) tablet 81 mg  81 mg DAILY Jose Bettencourt M.D.   81 mg at 05/15/21 0532   • clopidogrel (PLAVIX) tablet  75 mg  75 mg DAILY Jose Bettencourt M.D.   75 mg at 05/15/21 0532   • midodrine (PROAMATINE) tablet 10 mg  10 mg TID WITH MEALS Lucretia Villatoro M.D.   10 mg at 05/14/21 1830   • guaiFENesin ER (MUCINEX) tablet 600 mg  600 mg Q12HRS Lucretia Villatoro M.D.   600 mg at 05/15/21 0532   • [Held by provider] apixaban (ELIQUIS) tablet 5 mg  5 mg BID Macho Lang M.D.   5 mg at 05/13/21 1708   • [Held by provider] furosemide (LASIX) injection 80 mg  80 mg Q DAY Macho Lang M.D.   80 mg at 05/12/21 0555   • heparin injection 1,800 Units  1,800 Units DIALYSIS PRN Mario Hunter M.D.   1,800 Units at 05/11/21 1203   • acetaminophen (Tylenol) tablet 650 mg  650 mg Q4HRS PRN Macho Lang M.D.   650 mg at 05/15/21 0533   • atorvastatin (LIPITOR) tablet 40 mg  40 mg QHS Macho Lang M.D.   40 mg at 05/14/21 2007   • calcitRIOL (ROCALTROL) capsule 0.25 mcg  0.25 mcg DAILY Macho Lang M.D.   0.25 mcg at 05/15/21 0600   • gabapentin (NEURONTIN) capsule 300 mg  300 mg DAILY Macho Lang M.D.   300 mg at 05/14/21 2007   • insulin glargine (Semglee) injection  8 Units QAM Macho Lang M.D.   8 Units at 05/15/21 0536   • methimazole (TAPAZOLE) tablet 5 mg  5 mg BID Macho Lang M.D.   5 mg at 05/15/21 0533   • tamsulosin (FLOMAX) capsule 0.8 mg  0.8 mg QHS Macho Lang M.D.   0.8 mg at 05/14/21 2006   • senna-docusate (PERICOLACE or SENOKOT S) 8.6-50 MG per tablet 2 tablet  2 tablet BID Macho Lang M.D.        And   • polyethylene glycol/lytes (MIRALAX) PACKET 1 Packet  1 Packet QDAY PRN Macho Lang M.D.        And   • magnesium hydroxide (MILK OF MAGNESIA) suspension 30 mL  30 mL QDAY PRN Macho Lang M.D.        And   • bisacodyl (DULCOLAX) suppository 10 mg  10 mg QDAY PRN Macho Lang M.D.       • morphine (pf) 4 mg/mL injection 2-4 mg  2-4 mg Q5 MIN PRN Macho Lang M.D.       • nitroglycerin (NITROSTAT) tablet 0.4 mg  0.4 mg Q5 MIN PRN Macho Lang M.D.       • Metoprolol Tartrate  "(LOPRESSOR) injection 5 mg  5 mg Q5 MIN PRN Macho Lang M.D.       • insulin regular (HumuLIN R,NovoLIN R) injection  1-6 Units 4X/DAY ACHS Macho Lang M.D.        And   • glucose 4 g chewable tablet 16 g  16 g Q15 MIN PRN Macho Lang M.D.        And   • dextrose 50% (D50W) injection 50 mL  50 mL Q15 MIN PRN Macho Lang M.D.       • [Held by provider] carvedilol (COREG) tablet 3.125 mg  3.125 mg BID WITH MEALS Macho Lang M.D.   3.125 mg at 05/13/21 0846   Last reviewed on 5/10/2021 11:39 PM by Sandra Rodriguez    Allergies   Allergen Reactions   • Mircera [Methoxy Polyethylene Glycol-Epoetin Beta] Hives   • Other Drug      \"Opiods\" caused  hallucinations       Physical Exam  Body mass index is 26.76 kg/m². /70   Pulse 76   Temp 36.1 °C (96.9 °F) (Temporal)   Resp 18   Ht 1.676 m (5' 6\")   Wt 75.2 kg (165 lb 12.6 oz)   SpO2 96%    Vitals:    05/14/21 2320 05/14/21 2340 05/15/21 0358 05/15/21 0800   BP: 130/70 119/42 135/89 108/70   Pulse:   81 76   Resp:   17 18   Temp:   36.8 °C (98.2 °F) 36.1 °C (96.9 °F)   TempSrc:   Temporal Temporal   SpO2:   99% 96%   Weight:       Height:        Oxygen Therapy:  Pulse Oximetry: 96 %, O2 (LPM): 0, O2 Delivery Device: None - Room Air    General: no apparent distress  Neck: no JVD   Lungs: normal effort, diminished BLL, no wheezing or rhonchi, 3 L NC  Heart: normal rate, regular rhythm, no murmur, no rub  EXT: R BKA, L great toe amputation, no lower extremity edema.  Fistula to the L upper ext.  The fingers on the L hand are only slightly cyanotic today.     Abdomen: soft, non tender, non distended  Neurological: No focal deficits, no facial asymmetry.  Normal speech  Psychiatric: Appropriate affect, alert and oriented x 3  Skin: Warm extremities, no rash    Labs (personally reviewed):     Lab Results   Component Value Date/Time    SODIUM 136 05/15/2021 01:45 AM    POTASSIUM 5.7 (H) 05/15/2021 01:45 AM    CHLORIDE 97 05/15/2021 01:45 AM    CO2 25 " 05/15/2021 01:45 AM    GLUCOSE 97 05/15/2021 01:45 AM    BUN 37 (H) 05/15/2021 01:45 AM    CREATININE 5.74 (HH) 05/15/2021 01:45 AM     Lab Results   Component Value Date/Time    ALKPHOSPHAT 119 (H) 05/10/2021 09:19 PM    ASTSGOT 12 05/10/2021 09:19 PM    ALTSGPT 8 05/10/2021 09:19 PM    TBILIRUBIN 0.4 05/10/2021 09:19 PM      Lab Results   Component Value Date/Time    CHOLSTRLTOT 158 05/11/2021 03:04 AM     (H) 05/11/2021 03:04 AM    HDL 25 (A) 05/11/2021 03:04 AM    TRIGLYCERIDE 156 (H) 05/11/2021 03:04 AM         Cardiac Imaging and Procedures Review:      Personal Telemetry Review: SR in the 80s.    Echo 5/12/21:  CONCLUSIONS  Compared to the images of the study done 11/14/2020 - there is now   reduced ejection fraction with atrial fibrillation.     Moderately reduced left ventricular systolic function.  Left ventricular ejection fraction is visually estimated to be 30%.  Global hypokinesis.  Diastolic function is difficult to assess with atrial fibrillation.  The right ventricle was normal in size and function.  No significant valve disease or flow abnormalities.      These findings communicated to the ordering physician at the time of   the exam.    Echo 11/14/2020:  CONCLUSIONS  Compared to the images of the study done - 04/22/2020 there has been   improvement in LV function.  Normal left ventricular chamber size.  Left ventricular ejection fraction is visually estimated to be 55%.  Normal regional wall motion.  Moderately dilated left atrium.  Trace tricuspid regurgitation.  Estimated right ventricular systolic pressure  is 40 mmHg.    [facility] 11/16/2019:  CORONARY ARTERIOGRAPHY:  1.  Left main artery:  The left main artery is angiographically normal,   bifurcates into the left anterior descending artery and circumflex artery.  2.  Left anterior descending artery:  The left anterior descending artery   gives rise to a small caliber first diagonal branch, a normal complement of   septal branches and  "extends around the apex.  The left anterior descending   artery has a long stented segment from the proximal to mid vessel that is   patent.  The remainder of the artery is patent with mild diffuse atheromatous   disease of the distal and terminal vessel.  The jailed diagonal branch has   ostial eccentric 90% stenosis.  3.  Circumflex artery:  The circumflex gives rise to a tiny caliber first   marginal branch, 2 major long second and third marginal branches.  The   circumflex artery is patent with mild focal smooth atheromatous disease.  4.  Right coronary artery:  The right coronary artery gives rise to a   posterior descending artery and posterolateral branch.  The posterior   descending artery ostium has a focal smooth 50% stenosis.       POSTPROCEDURE DIAGNOSES:  1.  Patent coronary stents proximal mid, left anterior descending   artery.  2.  Coronary artery disease involving diagonal \"jailed\" branch   ostial 90% stenosis, posterior descending artery ostial 50% stenosis.    [facility] 5/13/2021  FINDINGS:  I. HEMODYNAMICS:               Ao: 100/48 mmHg              LEDP: 30 mmHg              Gradient on LV pullback: No`     II. LEFT VENTRICULOGRAM:              LVEF GONZALES PROJECTION: 15%                III. CORONARY ANGIOGRAPHY:  Left Main: Large bifurcating no CAD  Left Anterior Descending: Moderate caliber with a long segment of stenting in its proximal portion.  The ostial LAD is notable for an 80% eccentric calcified segment of in-stent restenosis.  Aggressive PTCA was unable to expand the segment of ISR at the ostium despite reaching 20 ko.  Cutting balloons were unable to cross.  Postintervention there is DARLYN-3 flow and 70% residual stenosis.  Left Circumflex: Moderate caliber mild nonobstructive CAD moderate caliber  Right Coronary: Distal 70% stenosis.     COMPLICATIONS: none apparent     CONCLUSIONS:  1.  LVEF 15- 20%, LVEDP 30 mmHg  2.  80% ostial LAD in-stent restenosis and distal RCA disease  3.  PTCA " ostial LAD without adequate stent expansion but good DARLYN flow     RECOMMENDATIONS:  Recommend further complicating the patient and consideration for complex high risk reattempt at ostial LAD intervention should that be within his goals of care as he is apparently DNR/DNI.  Prior to this I recommend further compensation from heart failure standpoint with achieving a dry weight and euvolemia through more aggressive hemodialysis which she is planning on doing today.  Would likely recommend left regular support and laser atherectomy to attempt to expand his area of ISR.  He would need to change his CODE STATUS to full code prior to embarking on any high risk complex procedure such as those outlined above.     Assessment and Medical Decision Makin   CAD with elevated troponin  -    Troponin peaked at 657, down-trending  -    Echo with newly reduced EF 30%  -    [facility] (2021) per above. Needs high risk, staged PCI of LAD with possible LV assist device; patient and nephrology discussed with Dr. Cramer yesterday. Patient still wished to proceed. Will arrange for PCI of LAD on Monday    2   ESRD on HD , , Sat  -    Follows with Dr. Tomlin out patient  -    Dr. Hunter notes hypotensive during HD.     4   New cardiomyopathy, EF 30%  -    See above plan  -    Volume is managed by dialysis    5    PAF  -    Continue low dose Eliquis  -    Maintaining SR today    5   Diabetes mellitus type II  -    A1 C 5.7     6   PAD  -    His L fingers (exculding the thumb) are improved today.  -    S/p R BKA, L great toe amputation.  -    Dr. Honeycutt following     Thank you for allowing me to participate in this patient's care.  Cardiology will continue to follow patient.  Please contact me with any additional questions or concerns.    JACQUELINE Gu.   [facility]  [phone]

## 2021-05-15 NOTE — PROGRESS NOTES
"Seen and examined patient in dialysis.    74 yo male with multiple medical problems including CHF and ESRD on HD via a left brachiocephalic AVF.  Developed steal syndrome.  Underwent successful DRIL recently.  His arterial system was found to be diffusively calcified throughout.      Recent duplex showed DRIL graft to be patent and improved flow to distal left radial artery.  Fistula was preserved.  Patient still has pain in left fingers, although somewhat better than before DRIL.    On physical exam, there is some cyanosis of the left fingers but no ulceration.  Intact S/M function of left hand and fingers.  The fistula is being used without any problem.    I had a long discussion with patient.  Unfortunately, there is no further bypass that I can do to improve circulation to his left hand.  Furthermore, his CHF also contributes to decreased flow to his left hand, even with a successful DRIL bypass.  The only thing that I can do to potentially improve the circulation to his left hand is to ligate the left upper arm AVF.  If I do this, patient will become catheter dependent for the rest of his life.  Placing a graft in the leg or on the other arm would potentially cause arterial steal problem as well since he has extensive atherosclerotic disease.    After a long discussion, patient indicated understanding.  He told me that he would have to \"think about it\" and let me know if he wants to have the fistula ligated.  All questions were answered.  "

## 2021-05-15 NOTE — PROGRESS NOTES
[facility] Progress Note        HD today x 3 hours per Dr. Hunter. Initiated at 1624 and ended at 1925.   Patient assessed prior tx. Patient alert and oriented x 4 and resting in bed.          UF Net: 2000 mL       Please see paper flowsheet for details.        Blood returned. Applied gauze and held  LUE AVF site for 10 minutes. Verified no bleeding post treatment. Bruit and thrill present post dialysis.   Instructions given to Primary RN that if bleeding occurs on the LUE AVF site, change dressing and held the site with pressure.         Report given to Primary nurse  Luciana MONTOYA.

## 2021-05-16 NOTE — PROGRESS NOTES
Discussed with patient again about having left upper arm AVF ligated to hopefully improve circulation to his left hand.  If the fistula is ligated, he will become Permacath dependent as AVG placed in the right arm and legs would potentially cause problem with arterial steal as well.     After a long discussion, patient decided not to have the AVF ligated and Permacath placed at this point.  All questions were answered.

## 2021-05-16 NOTE — PROGRESS NOTES
"[facility] -  PROGRESS NOTE               Author: TABATHA Becker Date & Time: 5/16/2021  10:54 AM     HPI:  75-year-old male with history of ESRD, on TTS HD, A. fib on Eliquis, diabetes mellitus, peripheral vascular disease presented with complaints of increasing shortness of breath along with occasional cough and mucus production for past couple of weeks.  No fever/chills.  Patient is compliant with hemodialysis sessions.  Diagnostic work-up significant for BNP in 35,000s, troponin of 657--> 552, EKG showing atrial fibrillation with RVR.  Chest x-ray: Small left pleural effusion and perihilar infiltrates.  K5.8 this AM.  Nephrology consulted for inpatient hemodialysis.     DAILY NEPHROLOGY SUMMARY:  5/12: Underwent HD yesterday.  Hypotensive this a.m with dizziness,. received 1 L fluid bolus.  Echo: LVEF 30%, global hypokinesis.  No chest pain  5/13: Pending cath this AM, Fatigued but denies chest pain. Bps improved this AM  5/14: LHC with  LVEF 15- 20%, 80% ostial LAD in-stent restenosis and distal RCA disease. Will need consideration for complex high risk reattempt at ostial LAD intervention.  Started on IV fluids overnight for hypotension. Short of breath this AM. L. Hand painful with worsening cyanosis  5/15: Symptomatic hypotension req IVF. C/o sob on oxygen.  C/o pain, tingling, coldness in left hand. Long discussion regarding access options. Patient verbalizes understandings. \"Wishes someone would make the decision\". K 5.7.  350 mL UOP recorded for last 24 hrs.   5/16:  C/o fatigue, unable to sleep secondary to pain in L hand. C/o ongoing L hand tingling/numbness. K 4.6, SNa 136, Phos 5.7.  2L Net UF, 0 mL UOP.  Difficulty obtaining accurate bps, denies any dizziness/lightheadedness or Headaches.    PAST FAMILY HISTORY: Reviewed and Unchanged  SOCIAL HISTORY: Reviewed and Unchanged  CURRENT MEDICATIONS: Reviewed  IMAGING STUDIES: Reviewed    ROS  Reviewed, negative other " "than stated above    PHYSICAL EXAM  VS:  BP (!) 102/30 Comment: Rn notified   Pulse 65   Temp 36.5 °C (97.7 °F) (Temporal)   Resp 16   Ht 1.676 m (5' 6\")   Wt 74.3 kg (163 lb 12.8 oz)   SpO2 90%   BMI 26.44 kg/m²   GENERAL: no acute distress  CV: rhythm irregular, No edema  RESP: CTAB  GI: Soft  MSK: R BKA, L great toe amputation.  SKIN: violaceous L hand. Cap Refill > 3 sec. Dusky/cyanotic. No ulcerations.  NEURO: AOx3  PSYCH: Cooperative  ACCESS: LUE AVF no longer oozing. Failed R avf,no bruit or thrill    Fluids:  In: 1180 [P.O.:480; Dialysis:700]  Out: 2800     LABS:  Recent Results (from the past 24 hour(s))   POCT glucose device results    Collection Time: 05/15/21 12:26 PM   Result Value Ref Range    Glucose - Accu-Ck 144 (H) 65 - 99 mg/dL   POCT glucose device results    Collection Time: 05/15/21  5:42 PM   Result Value Ref Range    Glucose - Accu-Ck 145 (H) 65 - 99 mg/dL   POCT glucose device results    Collection Time: 05/15/21  8:48 PM   Result Value Ref Range    Glucose - Accu-Ck 132 (H) 65 - 99 mg/dL   Renal Function Panel    Collection Time: 05/16/21  3:23 AM   Result Value Ref Range    Sodium 136 135 - 145 mmol/L    Potassium 4.6 3.6 - 5.5 mmol/L    Chloride 96 96 - 112 mmol/L    Co2 27 20 - 33 mmol/L    Glucose 119 (H) 65 - 99 mg/dL    Creatinine 4.21 (H) 0.50 - 1.40 mg/dL    Bun 24 (H) 8 - 22 mg/dL    Calcium 9.6 8.5 - 10.5 mg/dL    Phosphorus 5.7 (H) 2.5 - 4.5 mg/dL    Albumin 3.7 3.2 - 4.9 g/dL   Basic Metabolic Panel    Collection Time: 05/16/21  3:23 AM   Result Value Ref Range    Anion Gap 13.0 7.0 - 16.0   CBC WITH DIFFERENTIAL    Collection Time: 05/16/21  3:23 AM   Result Value Ref Range    WBC 8.7 4.8 - 10.8 K/uL    RBC 2.85 (L) 4.70 - 6.10 M/uL    Hemoglobin 9.6 (L) 14.0 - 18.0 g/dL    Hematocrit 29.4 (L) 42.0 - 52.0 %    .2 (H) 81.4 - 97.8 fL    MCH 33.7 (H) 27.0 - 33.0 pg    MCHC 32.7 (L) 33.7 - 35.3 g/dL    RDW 58.7 (H) 35.9 - 50.0 fL    Platelet Count 244 164 - 446 K/uL "    MPV 9.4 9.0 - 12.9 fL    Neutrophils-Polys 61.70 44.00 - 72.00 %    Lymphocytes 19.20 (L) 22.00 - 41.00 %    Monocytes 13.50 (H) 0.00 - 13.40 %    Eosinophils 4.50 0.00 - 6.90 %    Basophils 0.50 0.00 - 1.80 %    Immature Granulocytes 0.60 0.00 - 0.90 %    Nucleated RBC 0.00 /100 WBC    Neutrophils (Absolute) 5.40 1.82 - 7.42 K/uL    Lymphs (Absolute) 1.68 1.00 - 4.80 K/uL    Monos (Absolute) 1.18 (H) 0.00 - 0.85 K/uL    Eos (Absolute) 0.39 0.00 - 0.51 K/uL    Baso (Absolute) 0.04 0.00 - 0.12 K/uL    Immature Granulocytes (abs) 0.05 0.00 - 0.11 K/uL    NRBC (Absolute) 0.00 K/uL   ESTIMATED GFR    Collection Time: 05/16/21  3:23 AM   Result Value Ref Range    GFR If  17 (A) >60 mL/min/1.73 m 2    GFR If Non  14 (A) >60 mL/min/1.73 m 2   POCT glucose device results    Collection Time: 05/16/21  6:30 AM   Result Value Ref Range    Glucose - Accu-Ck 95 65 - 99 mg/dL       (click the triangle to expand results)      ASSESSMENT:  # ESRD: normally T/T/S via. AVF.   # Hypotension: cardigenic etiology. Not volume responsive.   # Cardiomyopathy: acute worsening. EF now 15-20%  # 80% ostial LAD in-stent restenosis:   -Unable to bypass further  -Ligate L AVF,will become cathter dependent  # Steal syndrome: s/p DRI. Worsening apparent ischmia in setting of heart failure  # Anemia in CKD  # Atrial fibrillation  # Diabetes mellitus  # Hyperthyroidism  # Hyperkalemia  -Correct with HD  -Low K diet  #Hyperphosphatemia      PLAN:  -No HD today (SUN), optimize volume status for possible cardiac intervention on Monday 5/17  -Avoid volume expansion going forward if possible in ESRD   -ongoing GOC discussions, considering  complex high risk reattempt at ostial LAD intervention  -Vascular surgery aware of steal/ischemia. IF no improvement going forward may need catheter and ligation  -Dose all meds for ESRD  -Renal Diet/Low K Diet  -Start Phos binder with meals   -Epogen with HD  -Hold Apixaban and Hep  with HD tx  -Continue calcitriol, phos binder    Thank you,

## 2021-05-16 NOTE — PROGRESS NOTES
Received report from NOC RN. Assumed care of patient at 0700. Patient A&Ox 4, speaking in full sentences, follows commands and responds appropriately to questions. On 3L NC, no signs of respiratory distress. Respirations are even and unlabored. Patient states 3/10 pain at this time. POC discussed and agreed upon with patient. Call light and belongings within reach. Bed in lowest locked position. Upper side rails raised. Bed alarm on. Fall risk precautions in place. Hourly rounding. Will continue to monitor respiratory status.

## 2021-05-16 NOTE — PROGRESS NOTES
Hospital Medicine Daily Progress Note    Date of Service  5/16/2021    Chief Complaint  75 y.o. male admitted 5/10/2021 with   Chief Complaint   Patient presents with   • Shortness of Breath     Pt reports incureased use of accessory muscles, O2 at home >90%.         Hospital Course  75 y.o. male with history of ESRD on hemodialysis TTS, A. fib on eliquis, and diabetes and PAD, who presented 5/10/2021 for evaluation of 4 days of shortness of breath and cough.    BNP > 30,000, Trop 657>552  Echo: EF 30%, global hypokinesis. Cardiology plans [facility] 5/13  Procal normal, blood culture neg to date  CXR personally reviewed: Left lung base infiltrate or atelectasis with small left pleural effusion;    Cardiology: Patient underwent [facility] 5/13 showing calcified ostial LAD and significant. stenosis is distal RCA at the ostium of the posterior descending artery. PTCA of the ostial LAD was performed but non-dilatable with due to calcification. Plans high risk, staged PCI of LAD with possible LV assist device on 5/17    Vascular: ESRD on HD via a left brachiocephalic AVF.  Developed steal syndrome.  Underwent successful DRIL recently. His arterial system was found to be diffusively calcified throughout. L fingers cyanosis. To improve circulation, has to ligate the left AVF and patient will become catheter dependent. And CHF contributes to decreased flow in L hand. Patient wishes to proceed high risk PCI first.     Nephrology: HD TTS.     Patient was noted to have borderline low bp. On Midodrine 10mg tid. Given to his significant PAD, Bp reading on BUE unreliable. RN notified to check Bp in LLE for consistency and avoid IVF unless patient is symptomatic    PT/OT: [facility]    DNR/DNI patient, but he is willing to proceed high risk PCI, might need to change code status to full code prior to embarking on any high risk procedures.    Interval Problem Update  Overnight he was noted to have fluctuating Bp reading  systolic on LLE,  asymptomatic. Reports L fingers numbness and tingling and cyanosis.  Denies dizziness or drowsiness. No chest pain, sob.     All Data, Medication data reviewed.  Case discussed with nursing, CM,  nurse, pharmacy in IDT rounds.     Consultants/Specialty  Cardiology  Nephrology  Vascular     Code Status  DNAR/DNI    Disposition  TBD    Review of Systems  Review of Systems   Constitutional: Negative for chills and fever.   HENT: Negative for ear discharge.    Eyes: Negative for blurred vision.   Respiratory: Positive for cough and shortness of breath. Negative for hemoptysis.    Cardiovascular: Negative for chest pain and palpitations.   Gastrointestinal: Negative for nausea and vomiting.   Genitourinary: Negative for dysuria.   Musculoskeletal: Negative for myalgias.   Skin: Negative for rash.   Neurological: Positive for dizziness, tingling (L hand and fingers) and weakness. Negative for focal weakness.   Endo/Heme/Allergies: Does not bruise/bleed easily.   Psychiatric/Behavioral: Negative for depression.   All other systems reviewed and are negative.       Physical Exam  Temp:  [35.8 °C (96.5 °F)-37 °C (98.6 °F)] 36.5 °C (97.7 °F)  Pulse:  [51-98] 65  Resp:  [14-24] 16  BP: ()/(22-64) 102/30  SpO2:  [90 %-98 %] 90 %    Physical Exam  Vitals and nursing note reviewed.   Constitutional:       General: He is not in acute distress.     Appearance: Normal appearance. He is ill-appearing.   HENT:      Head: Normocephalic and atraumatic.      Mouth/Throat:      Pharynx: Oropharynx is clear.   Eyes:      Pupils: Pupils are equal, round, and reactive to light.   Cardiovascular:      Rate and Rhythm: Normal rate. Rhythm irregular.   Pulmonary:      Effort: Pulmonary effort is normal. No respiratory distress.      Breath sounds: Normal breath sounds. No wheezing.   Abdominal:      General: Abdomen is flat. Bowel sounds are normal.      Palpations: Abdomen is soft.   Musculoskeletal:         General: No swelling or  tenderness. Normal range of motion.      Comments: R BKA, L great toe amputation  Fistula to the L upper ext  L fingers cyanosis, cool and tender to touch   Skin:     General: Skin is warm and dry.   Neurological:      General: No focal deficit present.      Mental Status: He is alert and oriented to person, place, and time.   Psychiatric:         Mood and Affect: Mood normal.         Behavior: Behavior normal.         Fluids    Intake/Output Summary (Last 24 hours) at 5/16/2021 1023  Last data filed at 5/16/2021 0836  Gross per 24 hour   Intake 1540 ml   Output 2800 ml   Net -1260 ml       Laboratory  Recent Labs     05/14/21  0545 05/15/21  0145 05/16/21  0323   WBC 5.7 7.4 8.7   RBC 2.59* 2.77* 2.85*   HEMOGLOBIN 8.9* 9.5* 9.6*   HEMATOCRIT 26.7* 28.5* 29.4*   .1* 102.9* 103.2*   MCH 34.4* 34.3* 33.7*   MCHC 33.3* 33.3* 32.7*   RDW 57.3* 57.8* 58.7*   PLATELETCT 215 243 244   MPV 9.4 9.5 9.4     Recent Labs     05/14/21  0545 05/15/21  0145 05/16/21  0323   SODIUM 137 136 136   POTASSIUM 4.8 5.7* 4.6   CHLORIDE 99 97 96   CO2 26 25 27   GLUCOSE 92 97 119*   BUN 24* 37* 24*   CREATININE 4.31* 5.74* 4.21*   CALCIUM 9.2 10.2 9.6                   Imaging  US-HEMODIALYSIS GRAFT DUPLEX COMP UPPER EXTREMITY   Final Result      EC-ECHOCARDIOGRAM COMPLETE W/O CONT   Final Result      DX-CHEST-PORTABLE (1 VIEW)   Final Result         1.  Left lung base infiltrate or atelectasis with small left pleural effusion   2.  Cardiomegaly   3.  Perihilar interstitial prominence and bronchial wall cuffing, appearance suggests changes of underlying bronchial inflammation, consider bronchitis.      CL-LEFT HEART CATHETERIZATION WITH POSSIBLE INTERVENTION    (Results Pending)        Assessment/Plan  * Acute on chronic systolic congestive heart failure (HCC)  Assessment & Plan  Echo EF 30%, global hypokinesis, which has dropped from 55% in 11/2020  [facility] by cardiology 5/13:  calcified ostial LAD and significant. stenosis is distal  RCA at the ostium of the posterior descending artery.  PTCA of the ostial LAD was performed but non-dilatable with due to calcification.    Cardiology following, Plans high risk, staged PCI of LAD with possible LV assist device on 5/17    On statin, metoprolol. NO ACEI/ARB due to ESRD  I&O  Daily weight    Advance care planning  Assessment & Plan  Code status discussed with palliative care team, appreciated  Patient requests DNR/DNI. POLST form signed  However due to his high risk LAD and RCA lesion requiring reattempt procedure, need continue discussion GOC. He now wishes to pursue aggressive Rx. Need to change code status to full code prior to embarking on any high risk procedures    Peripheral vascular disease (HCC)- (present on admission)  Assessment & Plan  L fingers cyanosis and tool to touch, reports numbing and tingling  Followed by vascular Dr. Honeycutt. Dr. Honeycutt is consulted: ligation of L AVF? --patient is still thinking about it    Shortness of breath  Assessment & Plan  Multifactorial secondary to fluid overload from ESRD inadequate dialysis vs. CHF  ProBNP >02084  CXR personally reviewed: Left lung base infiltrate or atelectasis with small left pleural effusion; Perihilar interstitial prominence and bronchial wall cuffing, appearance suggests changes of underlying bronchial inflammation, consider bronchitis.  Echo: EF 30%, global hypokinesis  S/p [facility] 5/13, plan high risk [facility] 5/17    Hypotension  Assessment & Plan  Echo: EF 30%, which has decreased from 55% in 11/2020  Extensive PAD: His BP reading on BUE may be not reliable. Check Bp at LLE for consistent reading, updated to RN  Midodrine   Cont tele monitoring  Avoid IV fluid administration unless patient is symptomatic (given hx of CHF and ESRD)       End stage renal disease on dialysis (HCC)- (present on admission)  Assessment & Plan  On TTS schedule  Nephrology consulted and following    Atrial fibrillation with RVR ([facility])  Assessment & Plan  Rate  controlled   Cont eliquis and coreg  Eliquis on hold for incoming PCI on 5/17    Type 2 diabetes mellitus with kidney complication, with long-term current use of insulin (HCC)- (present on admission)  Assessment & Plan  Regular insulin sliding scale, follow-up A1c 5.7  On insulin glargine  accucheck with hypoglycemic protocol    Amputated great toe of left foot (HCC)  Assessment & Plan  Sec to diabetic ulcer    Hyperkalemia  Assessment & Plan  Without peak T waves, dialysis TTS  Nephrology following    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)- (present on admission)  Assessment & Plan  Hx of   Sec to PAD and diabetic ulcer    Dyslipidemia- (present on admission)  Assessment & Plan  Cont statin    Anemia- (present on admission)  Assessment & Plan  Complicated by end-stage renal failure on dialysis.   No sign of bleeding  Transfuse if Hb<7   Follow-up anemia labs, c/w chronic disease anemia    Hyperthyroidism- (present on admission)  Assessment & Plan  Follow-up TSH normal  Continue Tapazole       VTE prophylaxis: eliquis on hold, SCD now

## 2021-05-16 NOTE — PROGRESS NOTES
Hemodialysis ordered by Dr. Curtis. Treatment started at 1128 and ended at 1458. Pt stable, vss, no c/o post tx. See flow sheets for details. Net UF 2.1 L. Dr Curtis notified unable to removed UFG this tx. Reported to SKIP Collins RN. Lt ua avf dressing clean, dry, intact.

## 2021-05-16 NOTE — PROGRESS NOTES
[facility] Follow-up Note    Date of Service:    5/16/2021      Name:   Luis Sepulveda   YOB: 1945  Age:   75 y.o.  male   MRN:   [id number]      Chief Complaint: shortness of breath     Primary cardiologist:  Dr. Osborn    HPI:  Mr Sepulveda is a 76 y/o fellow with PAF on low dose Eliquis, CAD, ESRD on HD T, Th, Sa who presented to [facility] on 5/10/21 with shortness of breath.  Apparently lost quite a bit of mass lately and thought to have hypervolemia.    Interim Events:  5/15/2021: No overnight cardiac events. Tele monitoring shows SR. VSS; 3L NC; Daily weight not compelted; discussed with nursing. Labs reviewed; K- 5.7, BUN/Crt- 37/5.74. HD today, [facility] tentatively planned for Monday. GOC discussed; patient would like to proceed.   5/16/2021: No overnight cardiac events. Tele monitoring shows SR. VSS- hypotensive this AM, BP medications held; 3L NC; Daily weight 74.3 kg. -2.8 L out in HD yesterday. Labs reviewed; K- 4.6, BUN/Crt- 24/4.21.  Discussed left heart cath with Dr. Ureña and Dr. Auguste (interventional cardiology); agreeable to proceed with stenting of LAD.  However, this probably will not improve his heart function or blood pressure.  Discussed with patient; patient agreeable to hold off on left heart cath at this time.  Patient may discuss with his cardiologist Dr. Osborn and pursue as outpatient procedure in the future. GDMT held d/t hypotension Recommend only short term use of midodrine ans low dose GDMT as able.     ROS  Constitutional:  denies fatigue. Dizziness improved  Respiratory: + shortness of breath, -orhtopnea + productive cough.  Cardiovascular:  Denies chest pain.  no lower extremity edema.  Denies orthopnea or PND.  : anuric  GI:  Denies nausea/vomiting.  No abdominal distention.  Neuro:  Denies dizziness, syncope.  Hem/lymph: Denies easy bleeding/bruising.  Ext:   L index finger blue, cool, numbness, tingling at times.    All  other review of systems reviewed and negative.    Past medical, surgical, social, and family history reviewed and unchanged from admission except as noted in HPI.    Medications: Reviewed in MAR  Current Facility-Administered Medications   Medication Dose Frequency Provider Last Rate Last Admin   • traMADol (ULTRAM) 50 MG tablet 50 mg  50 mg Q8HRS PRN Romero Burgos M.D.   50 mg at 05/15/21 2235   • HYDROmorphone (Dilaudid) injection 0.5 mg  0.5 mg Q6HRS PRN Lucretia Villatoro M.D.       • aspirin EC (ECOTRIN) tablet 81 mg  81 mg DAILY Jose Bettencourt M.D.   81 mg at 05/16/21 0619   • clopidogrel (PLAVIX) tablet 75 mg  75 mg DAILY Jose Bettencourt M.D.   75 mg at 05/16/21 0619   • midodrine (PROAMATINE) tablet 10 mg  10 mg TID WITH MEALS Lucretia Villatoro M.D.   10 mg at 05/15/21 1743   • guaiFENesin ER (MUCINEX) tablet 600 mg  600 mg Q12HRS Lucretia Villatoro M.D.   600 mg at 05/16/21 0619   • [Held by provider] apixaban (ELIQUIS) tablet 5 mg  5 mg BID Macho Lang M.D.   5 mg at 05/13/21 1708   • [Held by provider] furosemide (LASIX) injection 80 mg  80 mg Q DAY Macho Lang M.D.   80 mg at 05/12/21 0555   • heparin injection 1,800 Units  1,800 Units DIALYSIS PRN Mario Hunter M.D.   1,800 Units at 05/11/21 1203   • acetaminophen (Tylenol) tablet 650 mg  650 mg Q4HRS PRN Macho Lang M.D.   650 mg at 05/15/21 2049   • atorvastatin (LIPITOR) tablet 40 mg  40 mg QHS Macho Lang M.D.   40 mg at 05/15/21 2050   • calcitRIOL (ROCALTROL) capsule 0.25 mcg  0.25 mcg DAILY Macho Lang M.D.   0.25 mcg at 05/16/21 0619   • gabapentin (NEURONTIN) capsule 300 mg  300 mg DAILY Macho Lang M.D.   300 mg at 05/15/21 2049   • insulin glargine (Semglee) injection  8 Units QA Macho Lang M.D.   8 Units at 05/16/21 0631   • methimazole (TAPAZOLE) tablet 5 mg  5 mg BID Macho Lang M.D.   5 mg at 05/16/21 0619   • tamsulosin (FLOMAX) capsule 0.8 mg  0.8 mg QHS Macho Lang M.D.   0.8 mg at 05/15/21 2050   •  "senna-docusate (PERICOLACE or SENOKOT S) 8.6-50 MG per tablet 2 tablet  2 tablet BID Macho Lang M.D.        And   • polyethylene glycol/lytes (MIRALAX) PACKET 1 Packet  1 Packet QDAY PRN Macho Lang M.D.        And   • magnesium hydroxide (MILK OF MAGNESIA) suspension 30 mL  30 mL QDAY PRN Macho Lang M.D.        And   • bisacodyl (DULCOLAX) suppository 10 mg  10 mg QDAY PRN Macho Lang M.D.       • morphine (pf) 4 mg/mL injection 2-4 mg  2-4 mg Q5 MIN PRN Macho Lang M.D.       • nitroglycerin (NITROSTAT) tablet 0.4 mg  0.4 mg Q5 MIN PRN Macho Lang M.D.       • Metoprolol Tartrate (LOPRESSOR) injection 5 mg  5 mg Q5 MIN PRN Macho Lang M.D.       • insulin regular (HumuLIN R,NovoLIN R) injection  1-6 Units 4X/DAY ACHCHARLY Lang M.D.        And   • glucose 4 g chewable tablet 16 g  16 g Q15 MIN PRN Macho Lang M.D.        And   • dextrose 50% (D50W) injection 50 mL  50 mL Q15 MIN PRN Macho Lang M.D.       • [Held by provider] carvedilol (COREG) tablet 3.125 mg  3.125 mg BID WITH MEALS Macho Lang M.D.   3.125 mg at 05/13/21 0846   Last reviewed on 5/10/2021 11:39 PM by Tammi Osorio PhT    Allergies   Allergen Reactions   • Mircera [Methoxy Polyethylene Glycol-Epoetin Beta] Hives   • Other Drug      \"Opiods\" caused  hallucinations       Physical Exam  Body mass index is 26.44 kg/m². BP (!) 98/39   Pulse 85   Temp 37 °C (98.6 °F) (Temporal)   Resp 16   Ht 1.676 m (5' 6\")   Wt 74.3 kg (163 lb 12.8 oz)   SpO2 90%    Vitals:    05/15/21 2200 05/16/21 0000 05/16/21 0400 05/16/21 0610   BP:  (!) 94/52  (!) 98/39   Pulse:  (!) 51 64 85   Resp:  (!) 24 14 16   Temp:  36.6 °C (97.9 °F) 37 °C (98.6 °F)    TempSrc:  Temporal Temporal    SpO2:  91% 90%    Weight: 74.3 kg (163 lb 12.8 oz)      Height:        Oxygen Therapy:  Pulse Oximetry: 90 %, O2 (LPM): 3, O2 Delivery Device: Silicone Nasal Cannula    General: no apparent distress  Neck: no JVD   Lungs: normal effort, " diminished BLL, no wheezing or rhonchi, 3 L NC  Heart: normal rate, regular rhythm, no murmur, no rub  EXT: R BKA, L great toe amputation, no lower extremity edema.  Fistula to the L upper ext.  The fingers on the L hand are only slightly cyanotic today.     Abdomen: soft, non tender, non distended  Neurological: No focal deficits, no facial asymmetry.  Normal speech  Psychiatric: Appropriate affect, alert and oriented x 3  Skin: Warm extremities, no rash    Labs (personally reviewed):     Lab Results   Component Value Date/Time    SODIUM 136 05/16/2021 03:23 AM    POTASSIUM 4.6 05/16/2021 03:23 AM    CHLORIDE 96 05/16/2021 03:23 AM    CO2 27 05/16/2021 03:23 AM    GLUCOSE 119 (H) 05/16/2021 03:23 AM    BUN 24 (H) 05/16/2021 03:23 AM    CREATININE 4.21 (H) 05/16/2021 03:23 AM     Lab Results   Component Value Date/Time    ALKPHOSPHAT 119 (H) 05/10/2021 09:19 PM    ASTSGOT 12 05/10/2021 09:19 PM    ALTSGPT 8 05/10/2021 09:19 PM    TBILIRUBIN 0.4 05/10/2021 09:19 PM      Lab Results   Component Value Date/Time    CHOLSTRLTOT 158 05/11/2021 03:04 AM     (H) 05/11/2021 03:04 AM    HDL 25 (A) 05/11/2021 03:04 AM    TRIGLYCERIDE 156 (H) 05/11/2021 03:04 AM         Cardiac Imaging and Procedures Review:      Personal Telemetry Review: SR in the 80s.    Echo 5/12/21:  CONCLUSIONS  Compared to the images of the study done 11/14/2020 - there is now   reduced ejection fraction with atrial fibrillation.     Moderately reduced left ventricular systolic function.  Left ventricular ejection fraction is visually estimated to be 30%.  Global hypokinesis.  Diastolic function is difficult to assess with atrial fibrillation.  The right ventricle was normal in size and function.  No significant valve disease or flow abnormalities.      These findings communicated to the ordering physician at the time of   the exam.    Echo 11/14/2020:  CONCLUSIONS  Compared to the images of the study done - 04/22/2020 there has been   improvement  "in LV function.  Normal left ventricular chamber size.  Left ventricular ejection fraction is visually estimated to be 55%.  Normal regional wall motion.  Moderately dilated left atrium.  Trace tricuspid regurgitation.  Estimated right ventricular systolic pressure  is 40 mmHg.    [facility] 11/16/2019:  CORONARY ARTERIOGRAPHY:  1.  Left main artery:  The left main artery is angiographically normal,   bifurcates into the left anterior descending artery and circumflex artery.  2.  Left anterior descending artery:  The left anterior descending artery   gives rise to a small caliber first diagonal branch, a normal complement of   septal branches and extends around the apex.  The left anterior descending   artery has a long stented segment from the proximal to mid vessel that is   patent.  The remainder of the artery is patent with mild diffuse atheromatous   disease of the distal and terminal vessel.  The jailed diagonal branch has   ostial eccentric 90% stenosis.  3.  Circumflex artery:  The circumflex gives rise to a tiny caliber first   marginal branch, 2 major long second and third marginal branches.  The   circumflex artery is patent with mild focal smooth atheromatous disease.  4.  Right coronary artery:  The right coronary artery gives rise to a   posterior descending artery and posterolateral branch.  The posterior   descending artery ostium has a focal smooth 50% stenosis.       POSTPROCEDURE DIAGNOSES:  1.  Patent coronary stents proximal mid, left anterior descending   artery.  2.  Coronary artery disease involving diagonal \"jailed\" branch   ostial 90% stenosis, posterior descending artery ostial 50% stenosis.    [facility] 5/13/2021  FINDINGS:  I. HEMODYNAMICS:               Ao: 100/48 mmHg              LEDP: 30 mmHg              Gradient on LV pullback: No`     II. LEFT VENTRICULOGRAM:              LVEF GONZALES PROJECTION: 15%                III. CORONARY ANGIOGRAPHY:  Left Main: Large bifurcating no CAD  Left Anterior " Descending: Moderate caliber with a long segment of stenting in its proximal portion.  The ostial LAD is notable for an 80% eccentric calcified segment of in-stent restenosis.  Aggressive PTCA was unable to expand the segment of ISR at the ostium despite reaching 20 ko.  Cutting balloons were unable to cross.  Postintervention there is DARLYN-3 flow and 70% residual stenosis.  Left Circumflex: Moderate caliber mild nonobstructive CAD moderate caliber  Right Coronary: Distal 70% stenosis.     COMPLICATIONS: none apparent     CONCLUSIONS:  1.  LVEF 15- 20%, LVEDP 30 mmHg  2.  80% ostial LAD in-stent restenosis and distal RCA disease  3.  PTCA ostial LAD without adequate stent expansion but good DARLYN flow     RECOMMENDATIONS:  Recommend further complicating the patient and consideration for complex high risk reattempt at ostial LAD intervention should that be within his goals of care as he is apparently DNR/DNI.  Prior to this I recommend further compensation from heart failure standpoint with achieving a dry weight and euvolemia through more aggressive hemodialysis which she is planning on doing today.  Would likely recommend left regular support and laser atherectomy to attempt to expand his area of ISR.  He would need to change his CODE STATUS to full code prior to embarking on any high risk complex procedure such as those outlined above.     Assessment and Medical Decision Makin   CAD with elevated troponin  -    Troponin peaked at 657, down-trending  -    Echo with newly reduced EF 30%  -    S/P PTCA on 2021 with 70% residual stenosis of ostial LAD.   -    Patient would like to defer cath at this time.  -    Continue ASA, Plavix, high-dose statin.    2   ESRD on HD T, , Sat  -    Follows with Dr. Tomlin out patient  -    Dr. Hunter notes hypotensive during HD.   -    -2.8 L out in HD yesterday    4   New cardiomyopathy, EF 30%; Hypotension  -    Volume is managed by dialysis  -    Coreg held due to  hypotension; resume as able  -    Review additional medications also contributing to hypotension including gabapentin and Flomax  -    Consider losartan if patient tolerates Coreg and nephrology approves.    5    PAF  -    Continue low dose Eliquis to reduce stroke risk.  Held per nephrology  -    Maintaining SR today    5   Diabetes mellitus type II  -    A1 C 5.7     6   PAD  -    His L fingers (exculding the thumb) are improved today.  -    S/p R BKA, L great toe amputation.  -    Dr. Honeycutt following     Thank you for allowing me to participate in this patient's care.  Cardiology will sign off on this patient with outpatient follow-up per below.  Please contact me with any additional questions or concerns.    Future Appointments   Date Time Provider Department Center   5/21/2021  2:00 PM JACQUELINE Laws. [facility] None   7/30/2021 10:00 AM Kulwant Osborn M.D. [facility] None       JACQUELINE Gu.   [facility]  [phone]

## 2021-05-16 NOTE — PROGRESS NOTES
Handoff report received. Assumed patient care. Patient resting in bed. Patient not in distress, AAOX4. Pt is on O2, 3L NC. Safety precautions in place. Call light and personal belongings within reach. Bed is locked and in lowest position. Educated to call for assistance if needed, pt verbalized understanding.

## 2021-05-16 NOTE — CARE PLAN
Problem: Fall Risk  Goal: Patient will remain free from falls  Note: Bed alarm on. Encouraged to use call light when assistance needed. Hourly rounding in place.      Problem: Knowledge Deficit - Standard  Goal: Patient and family/care givers will demonstrate understanding of plan of care, disease process/condition, diagnostic tests and medications  Note: Encouraged to ask questions and voice concerns about plan of care and interventions. Questions and concerns addressed appropriately.   The patient is Watcher - Medium risk of patient condition declining or worsening         Progress made toward(s) clinical / shift goals:  progressing as expected    Patient is not progressing towards the following goals:

## 2021-05-16 NOTE — PALLIATIVE CARE
"Palliative Care follow-up  PC RN visited BS with cardiology NP to review current plan/recommendations from their perspective. Pt awaiting updates from vascular on his left arm. PC revisited some of Adalid's expressed wishes when talking with this RN previously. He tells PC that \"my time is limited\" and PC queried his thoughts surrounding this time. He made mention of being at home but the difficulty of this and PC discussed hospice, which he had not yet considered but states \"that's a really great idea actually.\" PC explained that with hospice, dialysis would cease and his concerns about mobility, etc., in the home would be less of a concern. PC queried whether he's had a discussion with his family about his situation, but he states the new information today is really changing the outlook on his clinical picture and this has not yet been shared with them. He tells PC that his daughter is on an anniversary vacation and \"I don't want to ruin that.\" He tells PC that \"you've given me something to think about.\" PC offered to check in in the next day or so once he has more information from the vascular team. He was appreciative.      Updated: BS RN    Plan: follow and support with POC    Thank you for allowing Palliative Care to support this patient and family. Contact [phone] for additional assistance, change in patient status, or with any questions/concerns.     "

## 2021-05-16 NOTE — CARE PLAN
The patient is Watcher - Medium risk of patient condition declining or worsening      Problem: Fall Risk  Goal: Patient will remain free from falls  Outcome: Progressing   Patient's risk for injury and falls assessed. Appropriate safety precautions in place. Patient educated to utilize call light for needs. Patient verbalizes understanding.       Problem: Knowledge Deficit - Standard  Goal: Patient and family/care givers will demonstrate understanding of plan of care, disease process/condition, diagnostic tests and medications  Outcome: Progressing  Plan of care discussed w/ pt. Encourage pt to voice feelings/concerns regarding treatment plan, diagnostic tests, procedures/results and medications. Answer accordingly.

## 2021-05-17 NOTE — PROGRESS NOTES
[facility] Medicine Daily Progress Note    Date of Service  5/17/2021    Chief Complaint  75 y.o. male admitted 5/10/2021 with   Chief Complaint   Patient presents with   • Shortness of Breath     Pt reports incureased use of accessory muscles, O2 at home >90%.         Hospital Course  75 y.o. male with history of ESRD on hemodialysis TTS, A. fib on eliquis, and diabetes and PAD, who presented 5/10/2021 for evaluation of 4 days of shortness of breath and cough.    BNP > 30,000, Trop 657>552  Echo: EF 30% (down from 55% in 11/2020),  global hypokinesis.   Procal normal, blood culture neg to date  CXR personally reviewed: Left lung base infiltrate or atelectasis with small left pleural effusion;    Cardiology: Patient underwent LHC 5/13 showing calcified ostial LAD and significant. stenosis is distal RCA at the ostium of the posterior descending artery. PTCA of the ostial LAD was performed but non-dilatable with due to calcification. Initially planed high risk, staged PCI of LAD with possible LV assist device on 5/17, but due to unclear benefit, no further procedure planned. Signed off, outpatient cards follow up.    Vascular: ESRD on HD via a left brachiocephalic AVF.  Developed steal syndrome.  Underwent successful DRIL recently. His arterial system was found to be diffusively calcified throughout. L fingers cyanosis.  Vascular is consulted. To improve circulation, has to ligate the left AVF and patient will become catheter dependent. Patient did not want to proceed     Nephrology: HD TTS.     Patient was noted to have borderline low bp. On Midodrine 10mg tid. Given to his significant PAD, Bp reading on BUE unreliable. RN notified to check Bp in LLE for consistency and avoid IVF unless patient is symptomatic    PT/OT: C    DNR/DNI     Interval Problem Update  Reports L fingers numbness and tingling and cyanosis, slightly improving  Denies dizziness or drowsiness. No chest pain, sob.     All Data, Medication data  reviewed.  Case discussed with nursing, CM,  nurse, pharmacy in IDT rounds.     Consultants/Specialty  Cardiology: signed off  Nephrology  Vascular: signed off    Code Status  DNAR/DNI    Disposition  [facility] am after dialysis    Review of Systems  Review of Systems   Constitutional: Negative for chills and fever.   HENT: Negative for ear discharge.    Eyes: Negative for blurred vision.   Respiratory: Positive for cough and shortness of breath. Negative for hemoptysis.    Cardiovascular: Negative for chest pain and palpitations.   Gastrointestinal: Negative for nausea and vomiting.   Genitourinary: Negative for dysuria.   Musculoskeletal: Negative for myalgias.   Skin: Negative for rash.   Neurological: Positive for dizziness, tingling (L hand and fingers) and weakness. Negative for focal weakness.   Endo/Heme/Allergies: Does not bruise/bleed easily.   Psychiatric/Behavioral: Negative for depression.   All other systems reviewed and are negative.       Physical Exam  Temp:  [35.9 °C (96.7 °F)-36.8 °C (98.2 °F)] 35.9 °C (96.7 °F)  Pulse:  [80-97] 88  Resp:  [16-18] 17  BP: ()/(32-46) 93/32  SpO2:  [95 %-100 %] 96 %    Physical Exam  Vitals and nursing note reviewed.   Constitutional:       General: He is not in acute distress.     Appearance: Normal appearance. He is ill-appearing.   HENT:      Head: Normocephalic and atraumatic.      Mouth/Throat:      Pharynx: Oropharynx is clear.   Eyes:      Pupils: Pupils are equal, round, and reactive to light.   Cardiovascular:      Rate and Rhythm: Normal rate. Rhythm irregular.   Pulmonary:      Effort: Pulmonary effort is normal. No respiratory distress.      Breath sounds: Normal breath sounds. No wheezing.   Abdominal:      General: Abdomen is flat. Bowel sounds are normal.      Palpations: Abdomen is soft.   Musculoskeletal:         General: No swelling or tenderness. Normal range of motion.      Comments: R BKA, L great toe amputation  Fistula to the L upper  ext  L fingers cyanosis, cool and tender to touch   Skin:     General: Skin is warm and dry.   Neurological:      General: No focal deficit present.      Mental Status: He is alert and oriented to person, place, and time.   Psychiatric:         Mood and Affect: Mood normal.         Behavior: Behavior normal.         Fluids    Intake/Output Summary (Last 24 hours) at 5/17/2021 1255  Last data filed at 5/16/2021 1854  Gross per 24 hour   Intake 240 ml   Output --   Net 240 ml       Laboratory  Recent Labs     05/15/21  0145 05/16/21  0323 05/17/21  0308   WBC 7.4 8.7 9.9   RBC 2.77* 2.85* 2.64*   HEMOGLOBIN 9.5* 9.6* 8.9*   HEMATOCRIT 28.5* 29.4* 27.1*   .9* 103.2* 102.7*   MCH 34.3* 33.7* 33.7*   MCHC 33.3* 32.7* 32.8*   RDW 57.8* 58.7* 57.8*   PLATELETCT 243 244 240   MPV 9.5 9.4 9.5     Recent Labs     05/15/21  0145 05/16/21  0323 05/17/21  0308   SODIUM 136 136 131*   POTASSIUM 5.7* 4.6 5.2   CHLORIDE 97 96 92*   CO2 25 27 23   GLUCOSE 97 119* 104*   BUN 37* 24* 44*   CREATININE 5.74* 4.21* 6.66*   CALCIUM 10.2 9.6 9.6                   Imaging  US-HEMODIALYSIS GRAFT DUPLEX COMP UPPER EXTREMITY   Final Result      EC-ECHOCARDIOGRAM COMPLETE W/O CONT   Final Result      DX-CHEST-PORTABLE (1 VIEW)   Final Result         1.  Left lung base infiltrate or atelectasis with small left pleural effusion   2.  Cardiomegaly   3.  Perihilar interstitial prominence and bronchial wall cuffing, appearance suggests changes of underlying bronchial inflammation, consider bronchitis.      CL-LEFT HEART CATHETERIZATION WITH POSSIBLE INTERVENTION    (Results Pending)        Assessment/Plan  * Acute on chronic systolic congestive heart failure (HCC)  Assessment & Plan  Echo EF 30%, global hypokinesis, which has dropped from 55% in 11/2020  [facility] by cardiology 5/13:  calcified ostial LAD and significant. stenosis is distal RCA at the ostium of the posterior descending artery.  PTCA of the ostial LAD was performed but non-dilatable  with due to calcification.    Cardiology following, cancelled high risk, staged PCI of LAD with possible LV assist device. Rec medical managements and outpatient follow up    On statin, metoprolol. NO ACEI/ARB due to ESRD  I&O  Daily weight    Advance care planning  Assessment & Plan  Code status discussed with palliative care team, appreciated  Patient requests DNR/DNI. POLST form signed    Peripheral vascular disease (HCC)- (present on admission)  Assessment & Plan  L fingers cyanosis and tool to touch, reports numbing and tingling  Followed by vascular Dr. Honeycutt. Dr. Honeycutt is consulted: ligation of L AVF? --patient did not want to proceed    Shortness of breath  Assessment & Plan  Multifactorial secondary to fluid overload from ESRD inadequate dialysis vs. CHF  ProBNP >82911  CXR personally reviewed: Left lung base infiltrate or atelectasis with small left pleural effusion; Perihilar interstitial prominence and bronchial wall cuffing, appearance suggests changes of underlying bronchial inflammation, consider bronchitis.  Echo: EF 30%, global hypokinesis  S/p [facility] 5/13    Hypotension  Assessment & Plan  Echo: EF 30%, which has decreased from 55% in 11/2020  Extensive PAD: His BP reading on BUE may be not reliable. Check Bp at LLE for consistent reading, updated to RN  Midodrine   Cont tele monitoring  Avoid IV fluid administration unless patient is symptomatic (given hx of CHF and ESRD). On hold coreg       End stage renal disease on dialysis ([facility])- (present on admission)  Assessment & Plan  On TTS schedule  Nephrology consulted and following    Atrial fibrillation with RVR ([facility])  Assessment & Plan  Rate controlled   Cont eliquis and coreg    Type 2 diabetes mellitus with kidney complication, with long-term current use of insulin ([facility])- (present on admission)  Assessment & Plan  Regular insulin sliding scale, follow-up A1c 5.7  On insulin glargine  accucheck with hypoglycemic protocol    Amputated great toe of  left foot (HCC)  Assessment & Plan  Sec to diabetic ulcer    Hyperkalemia  Assessment & Plan  Without peak T waves, dialysis TTS  Nephrology following    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)- (present on admission)  Assessment & Plan  Hx of   Sec to PAD and diabetic ulcer    Dyslipidemia- (present on admission)  Assessment & Plan  Cont statin    Anemia- (present on admission)  Assessment & Plan  Complicated by end-stage renal failure on dialysis.   No sign of bleeding  Transfuse if Hb<7   Follow-up anemia labs, c/w chronic disease anemia    Hyperthyroidism- (present on admission)  Assessment & Plan  Follow-up TSH normal  Continue Tapazole       VTE prophylaxis: eliquis

## 2021-05-17 NOTE — PROGRESS NOTES
"[facility] -  PROGRESS NOTE               Author: TABATHA Becker Date & Time: 5/17/2021  10:05 AM     HPI:  75-year-old male with history of ESRD, on TTS HD, A. fib on Eliquis, diabetes mellitus, peripheral vascular disease presented with complaints of increasing shortness of breath along with occasional cough and mucus production for past couple of weeks.  No fever/chills.  Patient is compliant with hemodialysis sessions.  Diagnostic work-up significant for BNP in 35,000s, troponin of 657--> 552, EKG showing atrial fibrillation with RVR.  Chest x-ray: Small left pleural effusion and perihilar infiltrates.  K5.8 this AM.  Nephrology consulted for inpatient hemodialysis.     DAILY NEPHROLOGY SUMMARY:  5/12: Underwent HD yesterday.  Hypotensive this a.m with dizziness,. received 1 L fluid bolus.  Echo: LVEF 30%, global hypokinesis.  No chest pain  5/13: Pending cath this AM, Fatigued but denies chest pain. Bps improved this AM  5/14: LHC with  LVEF 15- 20%, 80% ostial LAD in-stent restenosis and distal RCA disease. Will need consideration for complex high risk reattempt at ostial LAD intervention.  Started on IV fluids overnight for hypotension. Short of breath this AM. L. Hand painful with worsening cyanosis  5/15: Symptomatic hypotension req IVF. C/o sob on oxygen.  C/o pain, tingling, coldness in left hand. Long discussion regarding access options. Patient verbalizes understandings. \"Wishes someone would make the decision\". K 5.7.  350 mL UOP recorded for last 24 hrs.   5/16:  C/o fatigue, unable to sleep secondary to pain in L hand. C/o ongoing L hand tingling/numbness. K 4.6, SNa 136, Phos 5.7.  2L Net UF, 0 mL UOP.  Difficulty obtaining accurate bps, denies any dizziness/lightheadedness or Headaches.  5/17: Sitting up at side of bed. C/o chronic SOB and L Hand numbness/tingling/pain.  Eating breakfast.  Angiogram cx today, risks outweigh benefits.      PAST FAMILY HISTORY: " "Reviewed and Unchanged  SOCIAL HISTORY: Reviewed and Unchanged  CURRENT MEDICATIONS: Reviewed  IMAGING STUDIES: Reviewed    ROS  Reviewed, negative other than stated above    PHYSICAL EXAM  VS:  BP (!) 98/34   Pulse 87   Temp 36.8 °C (98.2 °F) (Temporal)   Resp 16   Ht 1.676 m (5' 6\")   Wt 71.5 kg (157 lb 10.1 oz)   SpO2 100%   BMI 25.44 kg/m²   GENERAL: no acute distress  CV: rhythm irregular, No edema  RESP: CTAB  GI: Soft  MSK: R BKA, L great toe amputation.  SKIN: violaceous L hand. Cap Refill > 3 sec. Dusky/cyanotic. No ulcerations.  NEURO: AOx3  PSYCH: Cooperative  ACCESS: LUE AVF no longer oozing. Failed R avf,no bruit or thrill    Fluids:  In: 600 [P.O.:600]  Out: -     LABS:  Recent Results (from the past 24 hour(s))   POCT glucose device results    Collection Time: 05/16/21  1:05 PM   Result Value Ref Range    Glucose - Accu-Ck 105 (H) 65 - 99 mg/dL   POCT glucose device results    Collection Time: 05/16/21  5:30 PM   Result Value Ref Range    Glucose - Accu-Ck 107 (H) 65 - 99 mg/dL   POCT glucose device results    Collection Time: 05/16/21 10:00 PM   Result Value Ref Range    Glucose - Accu-Ck 105 (H) 65 - 99 mg/dL   CBC WITH DIFFERENTIAL    Collection Time: 05/17/21  3:08 AM   Result Value Ref Range    WBC 9.9 4.8 - 10.8 K/uL    RBC 2.64 (L) 4.70 - 6.10 M/uL    Hemoglobin 8.9 (L) 14.0 - 18.0 g/dL    Hematocrit 27.1 (L) 42.0 - 52.0 %    .7 (H) 81.4 - 97.8 fL    MCH 33.7 (H) 27.0 - 33.0 pg    MCHC 32.8 (L) 33.7 - 35.3 g/dL    RDW 57.8 (H) 35.9 - 50.0 fL    Platelet Count 240 164 - 446 K/uL    MPV 9.5 9.0 - 12.9 fL    Neutrophils-Polys 74.00 (H) 44.00 - 72.00 %    Lymphocytes 16.00 (L) 22.00 - 41.00 %    Monocytes 9.00 0.00 - 13.40 %    Eosinophils 0.20 0.00 - 6.90 %    Basophils 0.40 0.00 - 1.80 %    Immature Granulocytes 0.40 0.00 - 0.90 %    Nucleated RBC 0.00 /100 WBC    Neutrophils (Absolute) 7.33 1.82 - 7.42 K/uL    Lymphs (Absolute) 1.59 1.00 - 4.80 K/uL    Monos (Absolute) 0.89 (H) " 0.00 - 0.85 K/uL    Eos (Absolute) 0.02 0.00 - 0.51 K/uL    Baso (Absolute) 0.04 0.00 - 0.12 K/uL    Immature Granulocytes (abs) 0.04 0.00 - 0.11 K/uL    NRBC (Absolute) 0.00 K/uL   Basic Metabolic Panel    Collection Time: 05/17/21  3:08 AM   Result Value Ref Range    Sodium 131 (L) 135 - 145 mmol/L    Potassium 5.2 3.6 - 5.5 mmol/L    Chloride 92 (L) 96 - 112 mmol/L    Co2 23 20 - 33 mmol/L    Glucose 104 (H) 65 - 99 mg/dL    Bun 44 (H) 8 - 22 mg/dL    Creatinine 6.66 (HH) 0.50 - 1.40 mg/dL    Calcium 9.6 8.5 - 10.5 mg/dL    Anion Gap 16.0 7.0 - 16.0   Renal Function Panel    Collection Time: 05/17/21  3:08 AM   Result Value Ref Range    Phosphorus 6.5 (H) 2.5 - 4.5 mg/dL    Albumin 3.5 3.2 - 4.9 g/dL   ESTIMATED GFR    Collection Time: 05/17/21  3:08 AM   Result Value Ref Range    GFR If African American 10 (A) >60 mL/min/1.73 m 2    GFR If Non  8 (A) >60 mL/min/1.73 m 2   POCT glucose device results    Collection Time: 05/17/21  5:59 AM   Result Value Ref Range    Glucose - Accu-Ck 96 65 - 99 mg/dL   POCT glucose device results    Collection Time: 05/17/21  8:13 AM   Result Value Ref Range    Glucose - Accu-Ck 119 (H) 65 - 99 mg/dL       (click the triangle to expand results)      ASSESSMENT:  # ESRD: normally T/T/S via. AVF.   # Hypotension: cardigenic etiology. Not volume responsive.   # Cardiomyopathy: acute worsening. EF now 15-20%  # 80% ostial LAD in-stent restenosis:   -Unable to bypass further  -Ligate L AVF,will become catheter dependent  -Pain management  # Steal syndrome: s/p DRI. Worsening apparent ischmia in setting of heart failure  # Anemia in CKD  # Atrial fibrillation  # Diabetes mellitus  # Hyperthyroidism  #Hyponatremia, mild  # Hyperkalemia  -Correct with HD  -Low K diet  #Hyperphosphatemia      PLAN:  -No HD today (MON), QTTS and PRN  -Avoid volume expansion going forward if possible in ESRD   -ongoing GOC discussions, considering  complex high risk reattempt at ostial LAD  intervention. Palliative vs. Hospice  -Vascular surgery aware of steal/ischemia  -Dose all meds for ESRD  -Renal Diet/Low K Diet  -Epogen with HD  -Okay to restart Apixaban and Hep  -Continue calcitriol, phos binder  -Does not want AVF ligation with Permacath placed at this time   -Discussed pain management with Primary team      Thank you,

## 2021-05-17 NOTE — PROGRESS NOTES
Monitor Summary:     Rhythm: SR/Afib 82-89  Ectopy: PVCs(f), coups, trigem, bigem, coups  Measurements: .21/.09/.38

## 2021-05-17 NOTE — PROGRESS NOTES
Assumed care of patient at 1915, received bedside report from day shift RN. Bed is locked and in lowest position with call light within reach. Treaded socks in place. Patient updated on plan of care. Patient medicated and repositioned for pain. White board updated. Pt A&Ox4. Patient's breathing pattern is unlabored on 3L humidified NC. Tele monitor in place and cardiac rhythm being monitored. All needs met at this time.

## 2021-05-17 NOTE — PROGRESS NOTES
Received report from NOC RN. Assumed care of patient at 0700. Patient A&Ox 4, speaking in full sentences, follows commands and responds appropriately to questions. On 3L NC, no signs of respiratory distress. Respirations are even and unlabored. Patient states 7/10 pain at this time. POC discussed and agreed upon with patient. Call light and belongings within reach. Bed in lowest locked position. Upper side rails raised. Bed alarm on. Fall risk precautions in place. Hourly rounding. Will continue to monitor blood flow.

## 2021-05-17 NOTE — CARE PLAN
The patient is Watcher - Medium risk of patient condition declining or worsening       Progress made toward(s) clinical / shift goals: Patient calls appropriately for assistance getting to the bathroom. Patient requires assistance of one and a front wheel walker but moves around very well.      Patient is not progressing towards the following goals: Patient blood pressures continue to run low.     Problem: Care Map:  Day of Discharge Optimal Outcome for the Heart Failure Patient  Goal: Day of Discharge:  Optimal Care of the heart failure patient  Outcome: Progressing     Problem: Fall Risk  Goal: Patient will remain free from falls  Outcome: Progressing     Problem: Knowledge Deficit - Standard  Goal: Patient and family/care givers will demonstrate understanding of plan of care, disease process/condition, diagnostic tests and medications  Outcome: Progressing

## 2021-05-17 NOTE — CARE PLAN
Problem: Fall Risk  Goal: Patient will remain free from falls  Note: Bed alarm on. Assistance provided for patient to move to and from bed. Encouraged to use call light when assistance needed.     Problem: Knowledge Deficit - Standard  Goal: Patient and family/care givers will demonstrate understanding of plan of care, disease process/condition, diagnostic tests and medications  Note: Updated on plan of care and encouraged to ask questions.

## 2021-05-17 NOTE — CARE PLAN
The patient is Watcher - Medium risk of patient condition declining or worsening     Progress made toward(s) clinical / shift goals: Patient calls appropriately for assistance getting to the bathroom. Patient requires assistance of one and a front wheel walker but moves around very well.  Remains free from falls. Patient pain is severe in L hand. Patient is vocal about pain needs and together we are avoiding breakthrough pain     Patient is not progressing towards the following goals: Patient blood pressures continue to run low.    Problem: Pain - Standard  Goal: Alleviation of pain or a reduction in pain to the patient’s comfort goal  Outcome: Progressing     Problem: Care Map:  Day of Discharge Optimal Outcome for the Heart Failure Patient  Goal: Day of Discharge:  Optimal Care of the heart failure patient  5/17/2021 0154 by Sharon Mcneal RADRIEN.  Outcome: Progressing    Problem: Fall Risk  Goal: Patient will remain free from falls  5/17/2021 0154 by Sharon Mcneal R.N.  Outcome: Progressing

## 2021-05-17 NOTE — HEART FAILURE PROGRAM
Echo has come back showing heart failure with reduced ejection fraction (HFrEF) with EF of 30%.    Nursing: please complete the HF Discharge Checklist (pink sheet in hard chart) and have it cosigned by your charge RN before patient leaves the hospital.    Providers: below are all Guideline Directed Medical Therapy (GDMT) indicated for HFrEF. If any can not be prescribed by discharge, please note the clinical reason for each in your discharge summary.    QUICK SUMMARY OF HFrEF MEASURES    -- Evidence Based Beta Blocker (bisoprolol, carvedilol, or toprol xl), for EF of 40% or less  -- YOMI - I, for EF of 40% or less  -- Aldosterone antagonist, for EF of 35% or less  -- Anticoagulation for atrial arrhythmia, if applicable  -- Glycemic control for DM + HF, if diabetic  -- Lipid lowering medication for DM + HF, if diabetic  -- Hydralazine dinitrate, if pt self identifies as   -- Pneumococcal vaccine if not previously received  -- Influenza vaccine if not previously received for the current flu season  -- Smoking cessation counseling documented if applicable  -- Device screening if applicable    Daily Nurse: please begin to fill out the HF checklist (pink sheet in hard chart) and use it to guide your daily care.    Discharge Nurse: please ensure completeness of the HF checklist (pink sheet in hard chart) and have it co-signed by the charge RN before the patient leaves the hospital.    Thank you, [place], Cardio RN Navigator [id number]      DETAILED EXPLANATION OF HF MEASURES:  1. Documentation of LV systolic function (echo or cath) PTA, during this hospitalization, or plan to assess post discharge or reason for not assessing documented  2. Documentation of fluid intake and urine output every nursing shift  3. 2 hour post diuretic assessment documented 2 hours after diuretic given  4. HF Patient Education using the Living Well With Heart Failure Booklet and Symptom Tracker documented every nursing  shift  5. Nutrition consult for diet education  6. Daily weights (one weight documented every 24 hours) on a standing scale unless standing is contraindicated in which case bed scale can be used - have patient write weight on symptom tracker  7. For LVEF less than or equal to 40%, ACE-I, ARNI or ARB prescribed at discharge   8. For LVEF less than or equal to 40%, an Evidence Based Beta Blocker (bisoprolol, carvedilol, toprol xl) must be prescribed at discharge  9. For LVEF less than or equal to 35% aldosterone blockade prescribed at discharge  10. The combination of hydralazine and isosorbide dinitrate is recommended to reduce morbidity and mortality for patients self-described  Americans with NYHA class III-IV HFrEF (EF 40% or less), receiving optimal therapy with ACE inhibitors and beta blockers, unless contraindicated (Class I, RADHA: A).  11. If a HF patient is diabetic or is newly diagnosed with DM: prescribed diabetes treatment at discharge in the form of glycemic control (diet or anti-hyperglycemic medication) or f/u appointment for diabetes management scheduled at discharge.  12. If a HF patient has diabetes: prescribed lipid lowering medication at discharge  13. Documented smoking cessation advice or counseling  14. If a HF patient has a-fib: anticoagulation is prescribed upon discharge or contraindication is documented  15. Screening for and administering immunizations as long as no contraindications: Pneumonia (regardless of age) and Influenza  16. Written discharge instructions include:  ? Daily weights  ? Record weight on tracker  ? Bring tracker to appointments  ? Call MD for weight gain of 3lb /day or 5lb/week  ? HF medication teaching  ? Low sodium diet  ? Follow up appointment within seven calendar days of d/c must include: date, time and location  ? Activity  ? Worsening symptoms    What if any of the above HF measures are contraindicated?  ? Request that the discharging provider document the  medication/intervention and the contraindication specifically in a progress note  ? For example: “no CHF meds due to hypotension” is not enough. It needs to say: “No ACE-I, ARNI, ARB due to hypotension”; “No Beta Blockade due to bradycardia”…

## 2021-05-18 NOTE — PROGRESS NOTES
Assumed care of patient at 1915, received bedside report from day shift RN. Bed is locked and in lowest position with call light within reach. Treaded socks in place. Patient updated on plan of care. Patient pain is currently managed. Patient is resting comfortably. White board updated. Pt A&Ox4 Patient's breathing pattern is unlabored on RA. Tele monitor in place and cardiac rhythm being monitored. All needs met at this time.

## 2021-05-18 NOTE — PROGRESS NOTES
[facility] Progress Note      HD ordered by Dr. Hodges. Treatment started at 0744 and ended at 1114.     Total Net UF 3000mL.    Pt tolerated treatment well with no issues. See paper flow sheet for details. Cannulation needles taken out, held pressure for 10 min and placed gauze dressing with no bleeding. WILL AVF + for bruit/thrill. Report given to GORDON Lim RN.

## 2021-05-18 NOTE — DISCHARGE INSTRUCTIONS
Discharge Instructions    Discharged to home by car with relative. Discharged via wheelchair, hospital escort: Yes.  Special equipment needed: Not Applicable    Be sure to schedule a follow-up appointment with your primary care doctor or any specialists as instructed.     Discharge Plan:   Diet Plan: Discussed  Activity Level: Discussed  Confirmed Follow up Appointment: Appointment Scheduled  Confirmed Symptoms Management: Discussed  Medication Reconciliation Updated: Yes    I understand that a diet low in cholesterol, fat, and sodium is recommended for good health. Unless I have been given specific instructions below for another diet, I accept this instruction as my diet prescription.   Other diet: heart healthy renal diabetic diet    Special Instructions:   HF Patient Discharge Instructions  · Monitor your weight daily, and maintain a weight chart, to track your weight changes.   · Activity as tolerated, unless your Doctor has ordered otherwise.  · Follow a low fat, low cholesterol, low salt diet unless instructed otherwise by your Doctor. Read the labels on the back of food products and track your intake of fat, cholesterol and salt.   · Fluid Restriction No. If a Fluid Restriction has been ordered by your Doctor, measure fluids with a measuring cup to ensure that you are not exceeding the restriction.   · No smoking.  · Oxygen No. If your Doctor has ordered that you wear Oxygen at home, it is important to wear it as ordered.  · Did you receive an explanation from staff on the importance of taking each of your medications and why it is necessary to keep taking them unless your doctor says to stop? Yes  · Were all of your questions answered about how to manage your heart failure and what to do if you have increased signs and symptoms after you go home? Yes  · Do you feel like your heart failure care team involved you in the care treatment plan and allowed you to make decisions regarding your care while in the  hospital and addressed any discharge needs you might have? Yes    See the educational handout provided at discharge for more information on monitoring your daily weight, activity and diet. This also explains more about Heart Failure, symptoms of a flare-up and some of the tests that you have undergone.     Warning Signs of a Flare-Up include:  · Swelling in the ankles or lower legs.  · Shortness of breath, while at rest, or while doing normal activities.   · Shortness of breath at night when in bed, or coughing in bed.   · Requiring more pillows to sleep at night, or needing to sit up at night to sleep.  · Feeling weak, dizzy or fatigued.     When to call your Doctor:  · Call [facility] seven days a week from 8:00 a.m. to 8:00 p.m. for medical questions [phone].  · Call your Primary Care Physician or Cardiologist if:   1. You experience any pain radiating to your jaw or neck.  2. You have any difficulty breathing.  3. You experience weight gain of 3 lbs in a day or 5 lbs in a week.   4. You feel any palpitations or irregular heartbeats.  5. You become dizzy or lose consciousness.   If you have had an angiogram or had a pacemaker or AICD placed, and experience:  1. Bleeding, drainage or swelling at the surgical / puncture site.  2. Fever greater than 100.0 F  3. Shock from internal defibrillator.  4. Cool and / or numb extremities.      · Is patient discharged on Warfarin / Coumadin?   No     Depression / Suicide Risk    As you are discharged from this [facility], it is important to learn how to keep safe from harming yourself.    Recognize the warning signs:  · Abrupt changes in personality, positive or negative- including increase in energy   · Giving away possessions  · Change in eating patterns- significant weight changes-  positive or negative  · Change in sleeping patterns- unable to sleep or sleeping all the time   · Unwillingness or inability to  communicate  · Depression  · Unusual sadness, discouragement and loneliness  · Talk of wanting to die  · Neglect of personal appearance   · Rebelliousness- reckless behavior  · Withdrawal from people/activities they love  · Confusion- inability to concentrate     If you or a loved one observes any of these behaviors or has concerns about self-harm, here's what you can do:  · Talk about it- your feelings and reasons for harming yourself  · Remove any means that you might use to hurt yourself (examples: pills, rope, extension cords, firearm)  · Get professional help from the community (Mental Health, Substance Abuse, psychological counseling)  · Do not be alone:Call your Safe Contact- someone whom you trust who will be there for you.  · Call your local CRISIS HOTLINE 666-4008 or 187-175-4938  · Call your local Children's Mobile Crisis Response Team [place] (332) 231-8229 or www.[employer]  · Call the toll free National Suicide Prevention Hotlines   · National Suicide Prevention Lifeline 420-317-ZVRH (6456)  · National Hope Line Network 800-SUICIDE (214-8714)

## 2021-05-18 NOTE — PROGRESS NOTES
"[facility] -  PROGRESS NOTE               Author: TABATHA Becker Date & Time: 5/18/2021  9:12 AM     HPI:  75-year-old male with history of ESRD, on TTS HD, A. fib on Eliquis, diabetes mellitus, peripheral vascular disease presented with complaints of increasing shortness of breath along with occasional cough and mucus production for past couple of weeks.  No fever/chills.  Patient is compliant with hemodialysis sessions.  Diagnostic work-up significant for BNP in 35,000s, troponin of 657--> 552, EKG showing atrial fibrillation with RVR.  Chest x-ray: Small left pleural effusion and perihilar infiltrates.  K5.8 this AM.  Nephrology consulted for inpatient hemodialysis.     DAILY NEPHROLOGY SUMMARY:  5/12: Underwent HD yesterday.  Hypotensive this a.m with dizziness,. received 1 L fluid bolus.  Echo: LVEF 30%, global hypokinesis.  No chest pain  5/13: Pending cath this AM, Fatigued but denies chest pain. Bps improved this AM  5/14: LHC with  LVEF 15- 20%, 80% ostial LAD in-stent restenosis and distal RCA disease. Will need consideration for complex high risk reattempt at ostial LAD intervention.  Started on IV fluids overnight for hypotension. Short of breath this AM. L. Hand painful with worsening cyanosis  5/15: Symptomatic hypotension req IVF. C/o sob on oxygen.  C/o pain, tingling, coldness in left hand. Long discussion regarding access options. Patient verbalizes understandings. \"Wishes someone would make the decision\". K 5.7.  350 mL UOP recorded for last 24 hrs.   5/16:  C/o fatigue, unable to sleep secondary to pain in L hand. C/o ongoing L hand tingling/numbness. K 4.6, SNa 136, Phos 5.7.  2L Net UF, 0 mL UOP.  Difficulty obtaining accurate bps, denies any dizziness/lightheadedness or Headaches.  5/17: Sitting up at side of bed. C/o chronic SOB and L Hand numbness/tingling/pain.  Eating breakfast.  Angiogram cx today, risks outweigh benefits.     5/18: Seen on HD. Resting " "more comfortably with PRN meds. C/o chronic SOB and continued L Hand pain/numbness/tingling.     PAST FAMILY HISTORY: Reviewed and Unchanged  SOCIAL HISTORY: Reviewed and Unchanged  CURRENT MEDICATIONS: Reviewed  IMAGING STUDIES: Reviewed    ROS  Reviewed, negative other than stated above    PHYSICAL EXAM  VS:  /51   Pulse 89   Temp 37 °C (98.6 °F) (Temporal)   Resp 18   Ht 1.676 m (5' 6\")   Wt 71.5 kg (157 lb 10.1 oz)   SpO2 95%   BMI 25.44 kg/m²   GENERAL: no acute distress  CV: rhythm irregular, No edema  RESP: CTAB  GI: Soft  MSK: R BKA, L great toe amputation.  SKIN: violaceous L hand. Cap Refill > 3 sec. Dusky/cyanotic. No ulcerations.  NEURO: AOx3  PSYCH: Cooperative  ACCESS: LUE AVF no longer oozing. Failed R avf,no bruit or thrill    Fluids:  No intake/output data recorded.    LABS:  Recent Results (from the past 24 hour(s))   POCT glucose device results    Collection Time: 05/17/21  1:38 PM   Result Value Ref Range    Glucose - Accu-Ck 134 (H) 65 - 99 mg/dL   POCT glucose device results    Collection Time: 05/17/21  5:55 PM   Result Value Ref Range    Glucose - Accu-Ck 107 (H) 65 - 99 mg/dL   POCT glucose device results    Collection Time: 05/17/21  8:17 PM   Result Value Ref Range    Glucose - Accu-Ck 110 (H) 65 - 99 mg/dL   Renal Function Panel    Collection Time: 05/18/21  3:49 AM   Result Value Ref Range    Sodium 130 (L) 135 - 145 mmol/L    Potassium 5.3 3.6 - 5.5 mmol/L    Chloride 85 (L) 96 - 112 mmol/L    Co2 27 20 - 33 mmol/L    Glucose 92 65 - 99 mg/dL    Creatinine 8.08 (HH) 0.50 - 1.40 mg/dL    Bun 54 (H) 8 - 22 mg/dL    Calcium 10.3 8.5 - 10.5 mg/dL    Phosphorus 7.3 (H) 2.5 - 4.5 mg/dL    Albumin 3.8 3.2 - 4.9 g/dL   CBC WITH DIFFERENTIAL    Collection Time: 05/18/21  3:49 AM   Result Value Ref Range    WBC 9.5 4.8 - 10.8 K/uL    RBC 2.79 (L) 4.70 - 6.10 M/uL    Hemoglobin 9.6 (L) 14.0 - 18.0 g/dL    Hematocrit 27.6 (L) 42.0 - 52.0 %    MCV 98.9 (H) 81.4 - 97.8 fL    MCH 34.4 (H) " 27.0 - 33.0 pg    MCHC 34.8 33.7 - 35.3 g/dL    RDW 55.0 (H) 35.9 - 50.0 fL    Platelet Count 267 164 - 446 K/uL    MPV 9.2 9.0 - 12.9 fL    Neutrophils-Polys 75.10 (H) 44.00 - 72.00 %    Lymphocytes 13.70 (L) 22.00 - 41.00 %    Monocytes 9.20 0.00 - 13.40 %    Eosinophils 1.10 0.00 - 6.90 %    Basophils 0.40 0.00 - 1.80 %    Immature Granulocytes 0.50 0.00 - 0.90 %    Nucleated RBC 0.00 /100 WBC    Neutrophils (Absolute) 7.15 1.82 - 7.42 K/uL    Lymphs (Absolute) 1.30 1.00 - 4.80 K/uL    Monos (Absolute) 0.88 (H) 0.00 - 0.85 K/uL    Eos (Absolute) 0.10 0.00 - 0.51 K/uL    Baso (Absolute) 0.04 0.00 - 0.12 K/uL    Immature Granulocytes (abs) 0.05 0.00 - 0.11 K/uL    NRBC (Absolute) 0.00 K/uL   Basic Metabolic Panel    Collection Time: 05/18/21  3:49 AM   Result Value Ref Range    Anion Gap 18.0 (H) 7.0 - 16.0   ESTIMATED GFR    Collection Time: 05/18/21  3:49 AM   Result Value Ref Range    GFR If  8 (A) >60 mL/min/1.73 m 2    GFR If Non African American 6 (A) >60 mL/min/1.73 m 2   POCT glucose device results    Collection Time: 05/18/21  5:56 AM   Result Value Ref Range    Glucose - Accu-Ck 119 (H) 65 - 99 mg/dL       (click the triangle to expand results)      ASSESSMENT:  # ESRD: normally T/T/S via. AVF.   # Hypotension: cardiogenic etiology. Not volume responsive.   # Cardiomyopathy: acute worsening. EF now 15-20%  # 80% ostial LAD in-stent restenosis:   -Unable to bypass further  -Ligate L AVF,will become catheter dependent  -Pain management  # Steal syndrome: s/p DRI. Worsening apparent ischmia in setting of heart failure  # Anemia in CKD  # Atrial fibrillation  # Diabetes mellitus  # Hyperthyroidism  #Hyponatremia, mild  # Hyperkalemia  -Correct with HD  -Low K diet  #Hyperphosphatemia      PLAN:  - HD today (TUES), QTTS and PRN  -Avoid volume expansion going forward if possible in ESRD   -ongoing GOC discussions, considering  complex high risk reattempt at ostial LAD intervention. Palliative  vs. Hospice  -Vascular surgery aware of steal/ischemia  -Dose all meds for ESRD  -Renal Diet/Low K Diet  -Epogen with HD  -Okay to restart Apixaban and Hep  -Continue calcitriol, phos binder  -Does not want AVF ligation with Permacath placed at this time   -Discussed pain management with Primary team      Thank you,

## 2021-05-18 NOTE — PALLIATIVE CARE
"Palliative Care follow-up  PC RN visited  this AM but pt away at HD. Revisited this afternoon and he tells PC he's \"going home today.\" He is excited to return home and appears comfortable with the plan set out by vascular regarding his fistula. He continues to c/o significant pain in his left arm/hand at times. PC discussed the possibility of outpatient PC services to assist with this pain and his phantom leg pain, which he is open to. PC made him aware of the [place] [facility] team should he received a call from them. Adalid was appreciative of the help and support.    Case discussed with RN and MD; referral being placed. Call to [facility] [facility] and spoke with Tamra who states they do contract with Medicare. PC made her aware of incoming referral. Choice faxed to [facility] noting need for palliative services. Voalte message to DURAN Jules and LAZARO Durbin with updates on this referral.       Plan: home today with HH follow up and palliative care (tentative)    Thank you for allowing Palliative Care to support this patient and family. Contact [phone] for additional assistance, change in patient status, or with any questions/concerns.     "

## 2021-05-18 NOTE — CARE PLAN
Problem: Fall Risk  Goal: Patient will remain free from falls  Outcome: Progressing     Problem: Knowledge Deficit - Standard  Goal: Patient and family/care givers will demonstrate understanding of plan of care, disease process/condition, diagnostic tests and medications  Outcome: Progressing     Problem: Pain - Standard  Goal: Alleviation of pain or a reduction in pain to the patient’s comfort goal  Outcome: Progressing   The patient is Stable - Low risk of patient condition declining or worsening         Progress made toward(s) clinical / shift goals:  dialysis, monitor pain and vitals    Patient is not progressing towards the following goals:

## 2021-05-18 NOTE — PROGRESS NOTES
Monitor Summary:     Rhythm: SR/ST  w/ BBB  Ectopy: PVCs(r), coups, trips   Measurements: .19/.14/.35

## 2021-05-18 NOTE — PROGRESS NOTES
Discharge paperwork given to patient. Reviewed all content, results, discharge plan, follow up appointments, and medications. All questions answered. PIV removed. Awaiting ride for discharge.

## 2021-05-18 NOTE — CARE PLAN
The patient is Stable - Low risk of patient condition declining or worsening         Progress made toward(s) clinical / shift goals: Patient pain is well controlled. Patient only requesting tylenol. All fall precautions in place for this patient. Calls appropriately.     Patient is not progressing towards the following goals: N/A      Problem: Fall Risk  Goal: Patient will remain free from falls  Outcome: Progressing     Problem: Pain - Standard  Goal: Alleviation of pain or a reduction in pain to the patient’s comfort goal  5/17/2021 2248 by Sharon Mcneal R.N.  Outcome: Progressing

## 2021-05-19 NOTE — DISCHARGE SUMMARY
Discharge Summary    CHIEF COMPLAINT ON ADMISSION  Chief Complaint   Patient presents with   • Shortness of Breath     Pt reports incureased use of accessory muscles, O2 at home >90%.         Reason for Admission  SOB     Admission Date  5/10/2021    CODE STATUS  DNR/DNI    HPI & HOSPITAL COURSE  75 y.o. male with history of ESRD on hemodialysis TTS, A. fib on eliquis, and diabetes and PAD presented 5/10/2021 for evaluation of 4 days of shortness of breath and cough.     BNP > 30,000, Trop 657>552  Echo: EF 30% (down from 55% in 11/2020),  global hypokinesis.   Procal normal, blood culture neg to date  CXR personally reviewed: Left lung base infiltrate or atelectasis with small left pleural effusion;     Cardiology: Patient underwent LHC 5/13 showing calcified ostial LAD and significant. stenosis is distal RCA at the ostium of the posterior descending artery. PTCA of the ostial LAD was performed but non-dilatable with due to calcification. Initially planed high risk, staged PCI of LAD with possible LV assist device on 5/17, but due to unclear benefit, no further procedure planned. Signed off, outpatient cards follow up.     Vascular: ESRD on HD via a left brachiocephalic AVF.  Developed steal syndrome.  Underwent successful DRIL recently. His arterial system was found to be diffusively calcified throughout. L fingers cyanosis.  Vascular is consulted. To improve circulation, has to ligate the left AVF and patient will become catheter dependent. Patient did not want to proceed      Nephrology: HD TTS.      Patient was noted to have borderline low bp. On Midodrine 10mg tid. Given to his significant PAD, Bp reading on BUE unreliable. RN notified to check Bp in LLE for consistency.     [facility]OT: [facility]     DNR/DNI - Palliative care consulted; will have a follow up as outpatient.    5/18 today - seen after HD session, reported feeling tired. Current plan for DC home with home health discussed. Pt has been advised to follow up  with vascular surgery and cardiology. He understands that he will have to deal with intermittent pain from LUE. GDMT spironolactone to be introduced as outpatient - currently not appropriate with low BP.      Therefore, he is discharged in fair and stable condition to home with close outpatient follow-up.    The patient met 2-midnight criteria for an inpatient stay at the time of discharge.    Discharge Date  5/18/2021    FOLLOW UP ITEMS POST DISCHARGE  N/A    DISCHARGE DIAGNOSES  Principal Problem:    Acute on chronic systolic congestive heart failure (HCC) POA: Yes  Active Problems:    Hyperthyroidism POA: Yes    CAD (coronary artery disease) POA: Yes      Overview: Stents to proximal and mid LAD in [facility].    Essential hypertension POA: Yes    Anemia POA: Yes    Dyslipidemia POA: Yes    Type 2 diabetes mellitus with kidney complication, with long-term current use of insulin ([facility]) POA: Yes    End stage renal disease on dialysis ([facility]) POA: Yes    Secondary hyperparathyroidism of renal origin ([facility]) POA: Yes    Hypotension POA: Yes    Shortness of breath POA: Yes    Peripheral vascular disease ([facility]) POA: Yes    Status post below-knee amputation of right lower extremity ([facility]) POA: Yes    Atrial fibrillation with RVR ([facility]) POA: Yes    Hyperkalemia POA: Yes    Amputated great toe of left foot ([facility]) POA: Yes    Advance care planning POA: Yes  Resolved Problems:    * No resolved hospital problems. *    Physical Exam  Vitals and nursing note reviewed.   Constitutional:       General: He is not in acute distress.     Appearance: Normal appearance.   HENT:      Head: Normocephalic and atraumatic.      Mouth/Throat:      Pharynx: Oropharynx is clear.   Eyes:      Pupils: Pupils are equal, round, and reactive to light.   Cardiovascular:      Rate and Rhythm: Normal rate. Rhythm irregular.   Pulmonary:      Effort: Pulmonary effort is normal. No respiratory distress.      Breath sounds: Normal breath sounds. No  wheezing.   Abdominal:      General: Abdomen is flat. Bowel sounds are normal.      Palpations: Abdomen is soft.   Musculoskeletal:         General: No swelling or tenderness. Normal range of motion.      Comments: R BKA, L great toe amputation  Fistula to the L upper ext  L fingers cyanosis, cool and tender to touch   Skin:     General: Skin is warm and dry.   Neurological:      General: No focal deficit present.      Mental Status: He is alert and oriented to person, place, and time.   Psychiatric:         Mood and Affect: Mood normal.         Behavior: Behavior normal.      FOLLOW UP  Future Appointments   Date Time Provider Department Center   5/21/2021  2:00 PM TABATHA Laws [facility] None   7/30/2021 10:00 AM Kulwant Osborn M.D. [facility] None     Patito Edgar M.D.  [address] [place] [address] [address] [place] [address]  [phone]    Schedule an appointment as soon as possible for a visit  Cardiology referral      MEDICATIONS ON DISCHARGE     Medication List      START taking these medications      Instructions   carvedilol 3.125 MG Tabs  Commonly known as: COREG   Take 1 tablet by mouth 2 times a day with meals. Hold if blood pressure <100 and heart rate <60  Dose: 3.125 mg     clopidogrel 75 MG Tabs  Start taking on: May 19, 2021  Commonly known as: PLAVIX   Take 1 tablet by mouth every day.  Dose: 75 mg     midodrine 10 MG tablet  Commonly known as: PROAMATINE   Take 1 tablet by mouth 3 times a day with meals.  Dose: 10 mg     oxyCODONE-acetaminophen 5-325 MG Tabs  Commonly known as: PERCOCET   Take 1 tablet by mouth every 8 hours as needed for Severe Pain for up to 5 days.  Dose: 1 tablet     sevelamer carbonate 800 MG Tabs tablet  Commonly known as: RENVELA   Take 2 Tablets by mouth 3 times a day with meals.  Dose: 1,600 mg        CHANGE how you take these medications      Instructions   apixaban 5mg Tabs  What changed:   · medication strength  · how much to take  Commonly known as: ELIQUIS   Take 1 tablet by  "mouth 2 times a day. Indications: Thromboembolism secondary to Atrial Fibrillation  Dose: 5 mg     gabapentin 300 MG Caps  What changed:   · when to take this  · reasons to take this  Commonly known as: NEURONTIN   Take 1 capsule by mouth every day.  Dose: 300 mg     methimazole 5 MG Tabs  What changed:   · how much to take  · how to take this  · when to take this  · additional instructions  Commonly known as: TAPAZOLE   Take 1 tablet by mouth 2 times a day.  Dose: 5 mg        CONTINUE taking these medications      Instructions   atorvastatin 40 MG Tabs  Commonly known as: LIPITOR   Take 1 tablet by mouth at bedtime.  Dose: 40 mg     calcitRIOL 0.25 MCG Caps  Start taking on: May 19, 2021  Commonly known as: ROCALTROL   Take 1 capsule by mouth every day.  Dose: 0.25 mcg     Lantus SoloStar 100 UNIT/ML Sopn injection  Generic drug: insulin glargine   Inject 8 Units under the skin at bedtime for 30 days.  Dose: 8 Units     omeprazole 20 MG delayed-release capsule  Commonly known as: PRILOSEC   Take 1 capsule by mouth 2 times a day.  Dose: 20 mg     tamsulosin 0.4 MG capsule  Commonly known as: FLOMAX   Take 2 Capsules by mouth at bedtime.  Dose: 0.8 mg     traZODone 50 MG Tabs  Commonly known as: DESYREL   Take 1 tablet by mouth at bedtime.  Dose: 50 mg            Allergies  Allergies   Allergen Reactions   • Mircera [Methoxy Polyethylene Glycol-Epoetin Beta] Hives   • Other Drug      \"Opiods\" caused  hallucinations         ACTIVITY  As tolerated.  Weight bearing as tolerated    CONSULTATIONS  Cardiology  Nephrology  Vascular surgery    PROCEDURES  Left Heart Cath    LABORATORY  Lab Results   Component Value Date    SODIUM 130 (L) 05/18/2021    POTASSIUM 5.3 05/18/2021    CHLORIDE 85 (L) 05/18/2021    CO2 27 05/18/2021    GLUCOSE 92 05/18/2021    BUN 54 (H) 05/18/2021    CREATININE 8.08 (HH) 05/18/2021        Lab Results   Component Value Date    WBC 9.5 05/18/2021    HEMOGLOBIN 9.6 (L) 05/18/2021    HEMATOCRIT 27.6 " (L) 05/18/2021    PLATELETCT 267 05/18/2021        Total time of the discharge process exceeds 36 minutes.

## 2021-05-20 PROBLEM — I99.8 ISCHEMIA OF TOE: Status: ACTIVE | Noted: 2021-01-01

## 2021-05-20 PROBLEM — L03.116 CELLULITIS OF LEFT FOOT: Status: ACTIVE | Noted: 2021-01-01

## 2021-05-20 PROBLEM — I10 ESSENTIAL HYPERTENSION: Status: RESOLVED | Noted: 2018-08-15 | Resolved: 2021-01-01

## 2021-05-20 PROBLEM — Z66 DNR (DO NOT RESUSCITATE): Status: ACTIVE | Noted: 2021-01-01

## 2021-05-20 NOTE — DISCHARGE PLANNING
Outpatient Dialysis Note     Confirmed patient is active at:     [facility]   [address]  [place] [address]     Schedule: Tuesday, Thursday, Saturday   Time: 05:40am      Patient is seen by Dr. Hunter in HD clinic.     Spoke with Clayton at facility who confirmed.     Forwarded records for review.     Becca Bojorquez- Dialysis Coordinator # [phone]  [facility]

## 2021-05-20 NOTE — H&P
Hospital Medicine History & Physical Note    Date of Service  5/20/2021    Primary Care Physician  Patito Edgar M.D.    Consultants  Limb preservation service  Nephrology. I discussed with Dr. Hodges    Code Status  DNAR/DNI    Chief Complaint  Chief Complaint   Patient presents with   • Weakness     x2 days   • Dizziness   • Hypotension     per dialysis clinic       History of Presenting Illness  75 y.o. male who presented 5/20/2021 with left foot pain  Mr. Sepulveda has a past medical history of end-stage renal disease on dialysis, peripheral vascular disease status post right below the knee amputation, insulin-dependent diabetes mellitus, there was most recently admitted here from 5/10/2021 through 5/18/2021 with shortness of breath.  During that hospitalization he underwent heart catheterization revealing ejection fraction of 15 to 20% with 80% ostial LAD in-stent restenosis and distal RCA disease.  He presents back today with 4 to 5 days of left foot pain especially when walking with his walker.  He has been compliant with dialysis and went on Tuesday.  He has not had a fever.  Here in the emergency room he is found to have cellulitis of the left foot with what appears to be an ischemic left fourth toe.  He will be initiated on IV antibiotics and the limb preservation service has been consulted.  He has been on Eliquis which will be held in case he requires amputation.  We had a long discussion about his condition and he is requested DO NOT RESUSCITATE which was previously been his condition in the past.  He denies exposure to persons known to have SARS-CoV-2 denies fevers or chills, no shortness of breath or cough.  A screening COVID swab has been ordered per protocol.    Review of Systems  Review of Systems   Constitutional: Negative for chills and fever.   Respiratory: Negative for cough and shortness of breath.    Cardiovascular: Negative for chest pain.   Gastrointestinal: Negative for nausea and  "vomiting.   All other systems reviewed and are negative.      Past Medical History   has a past medical history of Arrhythmia, Arthritis (12/29/2020), CAD (coronary artery disease), Chronic anticoagulation (3/26/2020), Chronic kidney disease (CKD), stage V ([facility]), Congestive heart failure ([facility]), Dental disorder (12/29/2020), Diabetes, Hemodialysis patient ([facility]), Hypertension, Kidney failure, Myocardial infarct ([facility]), Osteoarthritis, Peripheral neuropathy, Pneumonia, and Sleep apnea.    Surgical History   has a past surgical history that includes appendectomy; other orthopedic surgery; other cardiac surgery; toe amputation (Left, 5/17/2020); tenotomy (Left, 5/17/2020); toe amputation (Right, 11/16/2020); and knee amputation below (Right, 12/31/2020).     Family History  family history includes Alcohol/Drug in his father; Diabetes in his brother; Heart Disease in his mother; Thyroid in his son.     Social History   reports that he quit smoking about 7 years ago. His smoking use included cigarettes. He has a 55.00 pack-year smoking history. He has never used smokeless tobacco. He reports previous alcohol use. He reports that he does not use drugs.lives with his ex-wife and son    Allergies  Allergies   Allergen Reactions   • Mircera [Methoxy Polyethylene Glycol-Epoetin Beta] Hives   • Other Drug      \"Opiods\" caused  hallucinations       Medications  Prior to Admission Medications   Prescriptions Last Dose Informant Patient Reported? Taking?   apixaban (ELIQUIS) 5mg Tab 5/19/2021 at PM Patient No No   Sig: Take 1 tablet by mouth 2 times a day. Indications: Thromboembolism secondary to Atrial Fibrillation   atorvastatin (LIPITOR) 40 MG Tab 5/19/2021 at PM Patient No No   Sig: Take 1 tablet by mouth at bedtime.   calcitRIOL (ROCALTROL) 0.25 MCG Cap 5/19/2021 at AM Patient No No   Sig: Take 1 capsule by mouth every day.   carvedilol (COREG) 3.125 MG Tab 5/19/2021 at PM Patient No No   Sig: Take 1 tablet by mouth 2 times a " day with meals. Hold if blood pressure <100 and heart rate <60   clopidogrel (PLAVIX) 75 MG Tab 5/19/2021 at AM Patient No No   Sig: Take 1 tablet by mouth every day.   gabapentin (NEURONTIN) 300 MG Cap 5/19/2021 at PM Patient No No   Sig: Take 1 capsule by mouth every day.   insulin glargine (LANTUS SOLOSTAR) 100 UNIT/ML Solution Pen-injector injection 5/19/2021 at PM Patient No No   Sig: Inject 8 Units under the skin at bedtime for 30 days.   methimazole (TAPAZOLE) 5 MG Tab 5/19/2021 at PM Patient No No   Sig: Take 1 tablet by mouth 2 times a day.   midodrine (PROAMATINE) 10 MG tablet 5/19/2021 at PM Patient No No   Sig: Take 1 tablet by mouth 3 times a day with meals.   omeprazole (PRILOSEC) 20 MG delayed-release capsule 5/19/2021 at PM Patient No No   Sig: Take 1 capsule by mouth 2 times a day.   oxyCODONE-acetaminophen (PERCOCET) 5-325 MG Tab PRN at PRN Patient No No   Sig: Take 1 tablet by mouth every 8 hours as needed for Severe Pain for up to 5 days.   sevelamer carbonate (RENVELA) 800 MG Tab tablet 5/19/2021 at PM Patient No No   Sig: Take 2 Tablets by mouth 3 times a day with meals.   tamsulosin (FLOMAX) 0.4 MG capsule 5/19/2021 at PM Patient No No   Sig: Take 2 Capsules by mouth at bedtime.   traZODone (DESYREL) 50 MG Tab 5/19/2021 at PM Patient No No   Sig: Take 1 tablet by mouth at bedtime.      Facility-Administered Medications: None       Physical Exam  Temp:  [37.3 °C (99.1 °F)] 37.3 °C (99.1 °F)  Pulse:  [80-92] 83  Resp:  [16-24] 24  BP: ()/(40-51) 94/43  SpO2:  [88 %-100 %] 98 %    Physical Exam  Vitals reviewed.   Constitutional:       General: He is not in acute distress.     Appearance: He is not toxic-appearing.   HENT:      Head: Normocephalic and atraumatic.      Mouth/Throat:      Mouth: Mucous membranes are dry.      Pharynx: Oropharynx is clear.   Eyes:      General: No scleral icterus.     Conjunctiva/sclera: Conjunctivae normal.   Cardiovascular:      Rate and Rhythm: Normal rate  and regular rhythm.   Pulmonary:      Effort: Pulmonary effort is normal. No respiratory distress.      Breath sounds: Normal breath sounds.   Abdominal:      General: There is no distension.      Palpations: Abdomen is soft.      Tenderness: There is no abdominal tenderness.   Musculoskeletal:      Cervical back: Normal range of motion and neck supple.      Comments: Left upper arm fistula with a thrill  Right BKA  Left foot great toe amputation  Swelling and redness left foot and distal leg which was demarcated with a pen  Left 4th toe dark purple     Skin:     General: Skin is warm and dry.   Neurological:      General: No focal deficit present.      Mental Status: He is alert and oriented to person, place, and time.   Psychiatric:         Mood and Affect: Mood normal.         Behavior: Behavior normal.         Thought Content: Thought content normal.         Judgment: Judgment normal.         Laboratory:  Recent Labs     05/18/21  0349 05/20/21  0647   WBC 9.5 13.0*   RBC 2.79* 2.40*   HEMOGLOBIN 9.6* 8.1*   HEMATOCRIT 27.6* 24.7*   MCV 98.9* 102.9*   MCH 34.4* 33.8*   MCHC 34.8 32.8*   RDW 55.0* 59.0*   PLATELETCT 267 279   MPV 9.2 9.4     Recent Labs     05/18/21  0349 05/20/21  0647   SODIUM 130* 133*   POTASSIUM 5.3 5.3   CHLORIDE 85* 89*   CO2 27 25   GLUCOSE 92 147*   BUN 54* 52*   CREATININE 8.08* 7.82*   CALCIUM 10.3 9.8     Recent Labs     05/18/21  0349 05/20/21  0647   ALTSGPT  --  11   ASTSGOT  --  8*   ALKPHOSPHAT  --  120*   TBILIRUBIN  --  0.4   GLUCOSE 92 147*         No results for input(s): NTPROBNP in the last 72 hours.      Recent Labs     05/20/21  0647   TROPONINT 326*       Imaging:  DX-FOOT-2- LEFT   Final Result      1.  Soft tissue swelling of LEFT 4th toe.   2.  No gross bony destruction however osteomyelitis is not excluded by plain film.   3.  Prior partial amputation of great toe.      DX-CHEST-PORTABLE (1 VIEW)   Final Result      Left lower lobe pleural thickening and  calcification may be related to sequelae of prior infection or trauma.      Patchy left basilar opacity may represent atelectasis or pneumonitis.      Mild cardiomegaly.      Atherosclerotic plaque.               Assessment/Plan:  I anticipate this patient will require at least two midnights for appropriate medical management, necessitating inpatient admission.    * Cellulitis of left foot- (present on admission)  Assessment & Plan  One dose of vancomycin given in the ER  IV unasyn ordered    Ischemia of toe- (present on admission)  Assessment & Plan  Left 4th toe ischemia  Limb preservation service consulted  He likely will need amputation  Hold Eliquis for possible surgery.    Hypotension- (present on admission)  Assessment & Plan  Chronic condition  Stop coreg  Continue home midodrine    End stage renal disease on dialysis (HCC)- (present on admission)  Assessment & Plan  Dialysis via left arm fistula T,Th,S  Dr. Hodges, nephrology was consulted from the ER    Type 2 diabetes mellitus with kidney complication, with long-term current use of insulin ([facility])- (present on admission)  Assessment & Plan  With hyperglycemia  Continue Lantus and sliding scale  Diabetic diet    CAD (coronary artery disease)- (present on admission)  Assessment & Plan  With hx of stents followed by [facility]    DNR (do not resuscitate)- (present on admission)  Assessment & Plan  Discussed with Mr. Sepulveda and he confirmed this status.    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)- (present on admission)  Assessment & Plan  Right BKA and left great toe amputation    Cardiomyopathy, ischemic- (present on admission)  Assessment & Plan  EF from heart cath 5/10 15-20%    Peripheral vascular disease (HCC)- (present on admission)  Assessment & Plan  Hx of multiple amputations     Paroxysmal A-fib (HCC)- (present on admission)  Assessment & Plan  Chronic condition  Hold Eliquis in case he needs surgery and utilize SQ  heparin for DVT prophylaxis    Hyponatremia- (present on admission)  Assessment & Plan  Na 133 on admit

## 2021-05-20 NOTE — PROGRESS NOTES
LIMB PRESERVATION SERVICE     Known to LPS from previous admissions for RLE, now s/p right BKA.  Presents with left fourth toe ischemia.    Currently at dialysis    Patient is established with Dr. Encinas, vascular surgery  Arterial studies ordered.  Last arterial studies April 2020    Full consult to follow

## 2021-05-20 NOTE — ASSESSMENT & PLAN NOTE
One dose of vancomycin given in the ER  On 5/22, foot wound culture grew ESBL  Wound culture growing Klebsiella pneumonia ESBL, but use me diabetes, and Staph aureus   ID with final recommendation is to discontinue IV antibiotics as patient is achieved effective source control with below-knee amputation.  Resolved

## 2021-05-20 NOTE — ASSESSMENT & PLAN NOTE
With hx of stents followed by [facility]  No active chest pain at this time  Unable to initiate beta blocker or ACEI due to episodes of hypotension

## 2021-05-20 NOTE — ED NOTES
#2 lactic acid drawn et sent. Delay in draw d/t this nurse assiting with another critical pt.  Per ERP only doing ns 500cc pt is dialysis pt and contraindicated for this pt

## 2021-05-20 NOTE — PROGRESS NOTES
· 2 RN skin check compete with JAN Kathleen  · Devices in place Pulse ox on forehead, PIV x2 R arms.  · Skin assessed under devices: yes  · Multiple bruises bilateral arms, WILL fistula with scabbing (old sx site from bypass). RAKAN healed scar.   · R BKA with scabbng to L anterior aspect.   · Sacrum red but blanchable.   · LLE redness. Black/necrotic 4th toe. Hx of L great toe amputation. L heel red/painful but blanchable.   · Flaky, dry skin BLE.   · Following interventions in place: Pt turns self, Waffle mattress overlay, Pillows in place for repositioning, silicone oxygen tubing

## 2021-05-20 NOTE — DISCHARGE PLANNING
YENI received VM from April with [facility] [phone] who reported that the pt is on service with them.

## 2021-05-20 NOTE — ED NOTES
Report to Loree MONTOYA. Transport ticket filled out. Pt going to [id number] via [facility]. Waiting on transport @ this time

## 2021-05-20 NOTE — ED TRIAGE NOTES
"Chief Complaint   Patient presents with   • Weakness     x2 days   • Dizziness   • Hypotension     per dialysis clinic       BIBA for weakness, dizziness and hypotension reported by dialysis clinic. Upon arrival to ER pt sts has been feeling weak lately, pressure was found to be normal. Pt did not receive dialysis today.    /51   Pulse 92   Temp 37.3 °C (99.1 °F) (Temporal)   Resp 18   Ht 1.676 m (5' 6\")   Wt 72.6 kg (160 lb)   SpO2 91%   BMI 25.82 kg/m²     "

## 2021-05-20 NOTE — CONSULTS
"[facility] -  CONSULTATION NOTE               Author: Kulwant Hodges M.D. Date & Time: 5/20/2021  11:09 AM       REASON FOR CONSULTATION:   - Inpatient hemodialysis management.    CHIEF COMPLAINT:   -  \"Hypotnesion  \"    HISTORY OF PRESENT ILLNESS:      74 y/o man with ESRD HD T,TH,Sat at [place] presented with hypotension and inability to HD. On routine labs noted to have hyperkalemia.  Pt has increased erythema of 4th toe on left with some spreading erythema. Pt reports that this is worsening over the last few weeks, but though it secondary to trauma.    No F/C/N/V/CP/SOB.  No melena, hematochezia, hematemesis.  No HA, visual changes, or abdominal pain.    REVIEW OF SYSTEMS:    10 point ROS was performed and is as per HPI or otherwise negative    PAST MEDICAL HISTORY:   - ESRD  - Hypotension  - Anemia of CKD  - CKD-MBD  -CHF  -PVD  -Afib    PAST SURGICAL HISTORY:   - AV Fistula  -RBKA    FAMILY HISTORY:   - Reviewed and non contributory to current illness    SOCIAL HISTORY:   - distant tobacco  - distnat EtOH  - No illicits    HOME MEDICATIONS:   - Reviewed and documented in chart    LABORATORY STUDIES:   - Reviewed and documented in chart    ALLERGIES:  Mircera [methoxy polyethylene glycol-epoetin beta] and Other drug    VS:  BP (!) 94/43   Pulse 83   Temp 37.3 °C (99.1 °F) (Temporal)   Resp (!) 24   Ht 1.676 m (5' 6\")   Wt 72.6 kg (160 lb)   SpO2 98%   BMI 25.82 kg/m²   Physical Exam  Vitals and nursing note reviewed.   Constitutional:       Comments: Chronically ill   HENT:      Head: Normocephalic and atraumatic.      Mouth/Throat:      Mouth: Mucous membranes are dry.   Eyes:      Extraocular Movements: Extraocular movements intact.      Pupils: Pupils are equal, round, and reactive to light.   Cardiovascular:      Rate and Rhythm: Normal rate and regular rhythm.   Pulmonary:      Effort: Pulmonary effort is normal.      Breath sounds: Normal breath sounds. "   Abdominal:      General: Abdomen is flat.      Palpations: Abdomen is soft.   Musculoskeletal:      Cervical back: Normal range of motion and neck supple.      Comments: RBKA  Left great toe ampuation  Left arm fisutla wiht bruit thrill  Surgical site on left arm clean   Skin:     Comments: Mild erythema left lower extremity   Neurological:      General: No focal deficit present.      Mental Status: He is alert and oriented to person, place, and time.   Psychiatric:         Mood and Affect: Mood normal.         Behavior: Behavior normal.            FLUID BALANCE:  No intake/output data recorded.    LABS:  Recent Labs     05/18/21  0349 05/20/21  0647   SODIUM 130* 133*   POTASSIUM 5.3 5.3   CHLORIDE 85* 89*   CO2 27 25   GLUCOSE 92 147*   BUN 54* 52*   CREATININE 8.08* 7.82*   CALCIUM 10.3 9.8        IMAGING:  - All imaging reviewed from admission to present day    IMPRESSION:  # ESRD: normally T/T/S via. AVF. Left arm   # Hypotension: cardiogenic vs toe/cellultis  #PVD with left 4th great toe discoloration  # Cardiomyopathy: acute worsening. EF now 15-20%  # 80% ostial LAD in-stent restenosis:   # Steal syndrome: s/p DRI. Worsening apparent ischmia in setting of heart failure  # Anemia in CKD  # Atrial fibrillation  # Diabetes mellitus  # Hyperthyroidism  #Hyponatremia, mild  # Hyperkalemia  #Hyperphosphatemia    Plan  Vasc surgery eval of toe  Renal dose ABX  HD today 2 k bath  Limit IVF  Epo    Thank you for the consultation!

## 2021-05-20 NOTE — CARE PLAN
The patient is Watcher - Medium risk of patient condition declining or worsening    Shift Goals  Clinical Goals: Increase BP  Patient Goals: Increase BP, Pain control    Progress made toward(s) clinical / shift goals:  Midodrine administered. Pt at dialysis.       Problem: Knowledge Deficit - Standard  Goal: Patient and family/care givers will demonstrate understanding of plan of care, disease process/condition, diagnostic tests and medications  Outcome: Progressing     Problem: Fall Risk  Goal: Patient will remain free from falls  Outcome: Progressing

## 2021-05-20 NOTE — ED PROVIDER NOTES
ED Provider Note    Scribed for Jacob Moreno M.D. by Ada Gardner. 5/20/2021, 7:05 AM.    Primary care provider: Patito Edgar M.D.  Means of arrival: EMS  History obtained from: Patient  History limited by: None    CHIEF COMPLAINT  Chief Complaint   Patient presents with    Weakness     x2 days    Dizziness    Hypotension     per dialysis clinic       HPI  Luis Sepulveda is a 75 y.o. male, with a history of CKD on dialysis MWF, who presents to the Emergency Department via EMS for worsening left leg weakness and pain with an onset of one week ago. The patient describes he has some redness on his left calf region, and he has difficulty bearing weight on his left foot. Exacerbating factors include standing and walking. Rest is a mild alleviating factor. He reports associated left foot pain, and lightheadedness. He denies any associated numbness, or tingling. The patient was at the dialysis clinic earlier this morning, but he was instructed to come to the ED due to his low blood pressure. Patient did not receive dialysis treatment today.     REVIEW OF SYSTEMS  As above otherwise all other systems are negative    PAST MEDICAL HISTORY   has a past medical history of Arrhythmia, Arthritis (12/29/2020), CAD (coronary artery disease), Chronic anticoagulation (3/26/2020), Chronic kidney disease (CKD), stage V (HCC), Congestive heart failure (HCC), Dental disorder (12/29/2020), Diabetes, Hemodialysis patient (HCC), Hypertension, Kidney failure, Myocardial infarct (HCC), Osteoarthritis, Peripheral neuropathy, Pneumonia, and Sleep apnea.    SURGICAL HISTORY   has a past surgical history that includes appendectomy; other orthopedic surgery; other cardiac surgery; toe amputation (Left, 5/17/2020); tenotomy (Left, 5/17/2020); toe amputation (Right, 11/16/2020); and knee amputation below (Right, 12/31/2020).    SOCIAL HISTORY  Social History     Tobacco Use    Smoking status: Former Smoker     Packs/day: 1.00  "    Years: 55.00     Pack years: 55.00     Types: Cigarettes     Quit date: 2014     Years since quittin.3    Smokeless tobacco: Never Used   Vaping Use    Vaping Use: Never used   Substance Use Topics    Alcohol use: Not Currently    Drug use: No      Social History     Substance and Sexual Activity   Drug Use No       FAMILY HISTORY  Family History   Problem Relation Age of Onset    Heart Disease Mother     Alcohol/Drug Father     Thyroid Son     Diabetes Brother     Hypertension Neg Hx     Hyperlipidemia Neg Hx        CURRENT MEDICATIONS  Home Medications       Reviewed by Sandra Smith (Pharmacy Tech) on 21 at 0918  Med List Status: Complete     Medication Last Dose Status   apixaban (ELIQUIS) 5mg Tab 2021 Active   atorvastatin (LIPITOR) 40 MG Tab 2021 Active   calcitRIOL (ROCALTROL) 0.25 MCG Cap 2021 Active   carvedilol (COREG) 3.125 MG Tab 2021 Active   clopidogrel (PLAVIX) 75 MG Tab 2021 Active   gabapentin (NEURONTIN) 300 MG Cap 2021 Active   insulin glargine (LANTUS SOLOSTAR) 100 UNIT/ML Solution Pen-injector injection 2021 Active   methimazole (TAPAZOLE) 5 MG Tab 2021 Active   midodrine (PROAMATINE) 10 MG tablet 2021 Active   omeprazole (PRILOSEC) 20 MG delayed-release capsule 2021 Active   oxyCODONE-acetaminophen (PERCOCET) 5-325 MG Tab PRN Active   sevelamer carbonate (RENVELA) 800 MG Tab tablet 2021 Active   tamsulosin (FLOMAX) 0.4 MG capsule 2021 Active   traZODone (DESYREL) 50 MG Tab 2021 Active                    ALLERGIES  Allergies   Allergen Reactions    Mircera [Methoxy Polyethylene Glycol-Epoetin Beta] Hives    Other Drug      \"Opiods\" caused  hallucinations       PHYSICAL EXAM  VITAL SIGNS: /51   Pulse 92   Temp 37.3 °C (99.1 °F) (Temporal)   Resp 18   Ht 1.676 m (5' 6\")   Wt 72.6 kg (160 lb)   SpO2 91%   BMI 25.82 kg/m²     Constitutional: Well developed, Well nourished, No acute distress, " Non-toxic appearance.   HENT: Normocephalic, Atraumatic, Bilateral external ears normal, oropharynx dry, No oral exudates, Nose normal.   Eyes:conjunctiva is normal, there are no signs of exudate.   Neck: Supple, no meningeal signs.  Lymphatic: Lymphadenopathy noted on left side of left leg.   Cardiovascular: Regular rate and rhythm without murmurs gallops or rubs.   Thorax & Lungs: No respiratory distress. Breathing comfortably. Lungs are clear to auscultation bilaterally, there are no wheezes no rales. Chest wall is nontender.  Abdomen: Soft, nontender, nondistended. Bowel sounds are present.   Skin: Warm, Dry.          Musculoskeletal: Left 4th toe appears to be ischemic and tender to palpation, dorsum of left foot erythematous, left side of left lower leg is exquisitely tender, dorsum of left food warm, left toes are cold, right below knee amputation, Good range of motion in all major joints. no clubbing.   Neurologic: Alert & oriented x 3, Moving all extremities. No gross abnormalities.    Psychiatric: Affect normal, Judgment normal, Mood normal.     LABS  Results for orders placed or performed during the hospital encounter of 05/20/21   CBC with Differential   Result Value Ref Range    WBC 13.0 (H) 4.8 - 10.8 K/uL    RBC 2.40 (L) 4.70 - 6.10 M/uL    Hemoglobin 8.1 (L) 14.0 - 18.0 g/dL    Hematocrit 24.7 (L) 42.0 - 52.0 %    .9 (H) 81.4 - 97.8 fL    MCH 33.8 (H) 27.0 - 33.0 pg    MCHC 32.8 (L) 33.7 - 35.3 g/dL    RDW 59.0 (H) 35.9 - 50.0 fL    Platelet Count 279 164 - 446 K/uL    MPV 9.4 9.0 - 12.9 fL    Neutrophils-Polys 80.80 (H) 44.00 - 72.00 %    Lymphocytes 8.30 (L) 22.00 - 41.00 %    Monocytes 8.80 0.00 - 13.40 %    Eosinophils 0.60 0.00 - 6.90 %    Basophils 0.40 0.00 - 1.80 %    Immature Granulocytes 1.10 (H) 0.00 - 0.90 %    Nucleated RBC 0.00 /100 WBC    Neutrophils (Absolute) 10.48 (H) 1.82 - 7.42 K/uL    Lymphs (Absolute) 1.07 1.00 - 4.80 K/uL    Monos (Absolute) 1.14 (H) 0.00 - 0.85 K/uL     Eos (Absolute) 0.08 0.00 - 0.51 K/uL    Baso (Absolute) 0.05 0.00 - 0.12 K/uL    Immature Granulocytes (abs) 0.14 (H) 0.00 - 0.11 K/uL    NRBC (Absolute) 0.00 K/uL   Complete Metabolic Panel (CMP)   Result Value Ref Range    Sodium 133 (L) 135 - 145 mmol/L    Potassium 5.3 3.6 - 5.5 mmol/L    Chloride 89 (L) 96 - 112 mmol/L    Co2 25 20 - 33 mmol/L    Anion Gap 19.0 (H) 7.0 - 16.0    Glucose 147 (H) 65 - 99 mg/dL    Bun 52 (H) 8 - 22 mg/dL    Creatinine 7.82 (HH) 0.50 - 1.40 mg/dL    Calcium 9.8 8.5 - 10.5 mg/dL    AST(SGOT) 8 (L) 12 - 45 U/L    ALT(SGPT) 11 2 - 50 U/L    Alkaline Phosphatase 120 (H) 30 - 99 U/L    Total Bilirubin 0.4 0.1 - 1.5 mg/dL    Albumin 4.1 3.2 - 4.9 g/dL    Total Protein 7.6 6.0 - 8.2 g/dL    Globulin 3.5 1.9 - 3.5 g/dL    A-G Ratio 1.2 g/dL   Troponin STAT   Result Value Ref Range    Troponin T 326 (H) 6 - 19 ng/L   LACTIC ACID   Result Value Ref Range    Lactic Acid 2.4 (H) 0.5 - 2.0 mmol/L   LACTIC ACID   Result Value Ref Range    Lactic Acid 1.9 0.5 - 2.0 mmol/L   ESTIMATED GFR   Result Value Ref Range    GFR If  8 (A) >60 mL/min/1.73 m 2    GFR If Non African American 7 (A) >60 mL/min/1.73 m 2   SARS-CoV-2 PCR (24 hour In-House): Collect NP swab in VTM    Specimen: Respirate   Result Value Ref Range    SARS-CoV-2 Source NP Swab     SARS-CoV-2 by PCR NotDetected    EKG (NOW)   Result Value Ref Range    Report       [facility] Emergency Dept.    Test Date:  2021  Pt Name:    KLARISSA BRADSHAW              Department: ER  MRN:        [id number]                      Room:       RD 12  Gender:     Male                         Technician: [id number]  :        [id number]                   Requested By:ER TRIAGE PROTOCOL  Order #:    [id number]                    Reading MD: LUTHER TELLO MD    Measurements  Intervals                                Axis  Rate:       89                           P:          72  CO:         212                          QRS:         -85  QRSD:       144                          T:          89  QT:         408  QTc:        497    Interpretive Statements  SINUS RHYTHM  BORDERLINE AV CONDUCTION DELAY  RBBB AND LAFB  Compared to ECG 05/13/2021 13:42:54  Left anterior fascicular block now present  Electronically Signed On 5- 10:01:19 PDT by LUTHER TELLO MD     POCT glucose device results   Result Value Ref Range    Glucose - Accu-Ck 92 65 - 99 mg/dL     All labs reviewed by me.    EKG  Interpreted by me    RADIOLOGY  DX-FOOT-2- LEFT   Final Result      1.  Soft tissue swelling of LEFT 4th toe.   2.  No gross bony destruction however osteomyelitis is not excluded by plain film.   3.  Prior partial amputation of great toe.      DX-CHEST-PORTABLE (1 VIEW)   Final Result      Left lower lobe pleural thickening and calcification may be related to sequelae of prior infection or trauma.      Patchy left basilar opacity may represent atelectasis or pneumonitis.      Mild cardiomegaly.      Atherosclerotic plaque.         US-EXTREMITY ARTERY LOWER BILAT W/PATSY (COMBO)    (Results Pending)     The radiologist's interpretation of all radiological studies have been reviewed by me.    COURSE & MEDICAL DECISION MAKING  Pertinent Labs & Imaging studies reviewed. (See chart for details)    7:16 AM - Patient seen and examined at bedside. Discussed plan of care with patient. I informed them that labs and imaging will be ordered to evaluate symptoms. Patient is understanding and agreeable with plan.   Patient will be treated with NS infusion 500 ml, Unasyn 3 g, and Vancomycin 1750 mg IVPB. Ordered EKG, Troponin STAT, CMP, CBC with diff, Culture wound with gram stain, Blood culture, lactic acid, DX chest, and DX left foot  to evaluate his symptoms.     10:01 AM - Ordered SARS-CoV-2 to evaluate.     10:07 AM - Paged hospitalist.    10:14 AM - I discussed the patient's case and the above findings with Dr. Bustamante (hospitalist) who agreed to evaluate the  patient for hospitalization.    HYDRATION: Based on the patient's presentation of dehydration,  the patient was given IV fluids. IV Hydration was used because oral hydration is unable to be done due to the patient's symptoms. Upon recheck following hydration, the patient was mildly improved.      Decision Making:  Patient presents for evaluation. Patient does have what appears to be a necrotic toe with surrounding cellulitis that is extending up the leg. I started the patient with empiric antibiotics. Laboratory studies were drawn as above. At this point the patient will need to be admitted to the hospital for further treatment and care as well as limb preservation services. And for dialysis. I spoken to the hospitalist for this admission.    DISPOSITION:  Patient will  be hospitalized by Dr. valencia in guarded condition.     FINAL IMPRESSION  1. Toe necrosis (HCC)    2. Cellulitis of foot    3. Stage 5 chronic kidney disease on chronic dialysis (HCC)    4. Elevated troponin          I, Ada Gardner (Scribe), am scribing for, and in the presence of, Jacob Moreno M.D..    Electronically signed by: Ada Gardner ([place]), 5/20/2021    IJacob M.D. personally performed the services described in this documentation, as scribed by Ada Gardner in my presence, and it is both accurate and complete.    C    The note accurately reflects work and decisions made by me.  Jacob Moreno M.D.  5/20/2021  2:53 PM

## 2021-05-20 NOTE — ED NOTES
Pt provided PO fluids @ this time. Pt c/o feeling uncomfortable in bed. Pt repositioned @ this time. Will cont to monitor pt

## 2021-05-20 NOTE — ED NOTES
Report from Sowmya MONTOYA. Assumed pt care @ this time. Pt here from [facility] d/t findings of hypotension pre dialysis v/s. Pt sent here via EMS. Pt c/o feeling fatigued and more weak than usual. Pt c/o RLE pain from rt foot to calf. Pt's shoe and sock removed. Pt had great toe amputation yrs ago. 2nd digit is red 3rd digit is necrotic with some redness around the base of the digit. 4th and 5th digit are both red and painful as well. Pt c/o pain on palpation to bottom of foot near base of digits as well. Pt also c/o pain up to mid calf with palpation as well.Dr. Bassett @ bedside to photograph wound @ this time. Pt on room air with 02 sat 92% @ this time. Modified orthostatics obtained with no change from laying to sitting.   Laying= HR87, b/p=91/40  Sitting=HR= 89 b/p= 95/45  **PT ALSO HAS A TREMOR TO RT UPPER EXTREMITY THAT CREATES A LOW B/P. B/P IS MORE ACCURATE IF YOU STEADY PT'S ARM WHILE B/P IS TAKING.*

## 2021-05-21 NOTE — PROGRESS NOTES
[facility] Nephrology Daily Progress Note    Date of Service  5/21/2021    Author: Madison ECKERT, CNN-NP  Collaborating Physician: Dr. Hodges       Chief Complaint  74 y/o man with ESRD HD T,TH,Sat at [facility] presented with hypotension and inability to HD. On routine labs noted to have hyperkalemia.  Pt has increased erythema of 4th toe on left with some spreading erythema. Pt reports that this is worsening over the last few weeks, but though it secondary to trauma.     No F/C/N/V/CP/SOB.  No melena, hematochezia, hematemesis.  No HA, visual changes, or abdominal pain.    Daily Nephrology Summary:   5/20- consult done  5/21 - sitting up in bed, feeling good, appetite good, mild non-productive cough, tolerated HD yesterday UF: 158ml    Review of Systems  Review of Systems   Constitutional: Negative.    HENT: Negative.    Eyes: Negative.    Respiratory: Positive for cough.    Cardiovascular: Negative.    Gastrointestinal: Negative.    Genitourinary: Negative.    Musculoskeletal: Positive for joint pain.   Skin: Negative.    Neurological: Negative.    Psychiatric/Behavioral: Negative.         Physical Exam  Temp:  [35.9 °C (96.6 °F)-37.2 °C (98.9 °F)] 36.2 °C (97.2 °F)  Pulse:  [] 81  Resp:  [16-20] 20  BP: ()/(20-56) 103/56  SpO2:  [90 %-100 %] 93 %    Physical Exam  Constitutional:       Appearance: Normal appearance. He is normal weight.   HENT:      Head: Normocephalic and atraumatic.      Nose: Nose normal.      Mouth/Throat:      Mouth: Mucous membranes are dry.      Pharynx: Oropharynx is clear.   Eyes:      Extraocular Movements: Extraocular movements intact.      Conjunctiva/sclera: Conjunctivae normal.      Pupils: Pupils are equal, round, and reactive to light.   Cardiovascular:      Rate and Rhythm: Normal rate and regular rhythm.      Pulses: Normal pulses.      Heart sounds: Normal heart sounds.   Pulmonary:      Effort: Pulmonary effort is normal.      Breath sounds: Normal  breath sounds.   Abdominal:      General: Abdomen is flat. Bowel sounds are normal.      Palpations: Abdomen is soft.   Musculoskeletal:         General: Normal range of motion.      Cervical back: Normal range of motion and neck supple.      Comments: L 3rd toe black  RUE AVF    Skin:     General: Skin is warm and dry.      Capillary Refill: Capillary refill takes more than 3 seconds.   Neurological:      General: No focal deficit present.      Mental Status: He is alert and oriented to person, place, and time. Mental status is at baseline.   Psychiatric:         Mood and Affect: Mood normal.         Behavior: Behavior normal.         Thought Content: Thought content normal.         Judgment: Judgment normal.         Fluids    Intake/Output Summary (Last 24 hours) at 5/21/2021 0925  Last data filed at 5/20/2021 1626  Gross per 24 hour   Intake 900 ml   Output 1058 ml   Net -158 ml       Laboratory  Recent Labs     05/20/21  0647   WBC 13.0*   RBC 2.40*   HEMOGLOBIN 8.1*   HEMATOCRIT 24.7*   .9*   MCH 33.8*   MCHC 32.8*   RDW 59.0*   PLATELETCT 279   MPV 9.4     Recent Labs     05/20/21  0647   SODIUM 133*   POTASSIUM 5.3   CHLORIDE 89*   CO2 25   GLUCOSE 147*   BUN 52*   CREATININE 7.82*   CALCIUM 9.8         No results for input(s): NTPROBNP in the last 72 hours.        Imaging       Assessment/Plan  # ESRD   normally T/T/S via   AVF. Left arm   # Hypotension: cardiogenic vs toe/cellultis   midodrine  #PVD with left 4th great toe discoloration   Per vascular    abx   # Cardiomyopathy: acute worsening. EF now 15-20%  # 80% ostial LAD in-stent restenosis:   # Steal syndrome: s/p DRI. Worsening apparent ischmia in setting of heart failure  # Anemia in CKD   Check iron stores    ARMANDO   # Atrial fibrillation   plavix    BB  # Diabetes mellitus  # Hyperthyroidism   Tapazole  #Hyponatremia, mild   UF with HD   # Hyperkalemia, mild    Manage with HD   #Hyperphosphatemia   sevelamer TID with meals   #  BMD-CKD   Calcitriol   Sevelamer      Plan  HD q TThS and prn  UF with HD as tolerated   Vasc surgery eval of toe  DC IVFs, okay to bolus PRN symptomatic hypotension   Increase sevelamer   Renal dose ABX  Epo  Check iron stores

## 2021-05-21 NOTE — PROGRESS NOTES
Hemodialysis ordered by Dr. Hodges. Treatment started at 1326 and ended at 1626. Pt stable, vss, no c/o post tx. See flow sheets for details. Net  ml d/t hypotensive during tx. Had to bolus  ml. Dr. Hodges notified and aware. Pt also asymptomatic. Pt stable, vss, no c/o post tx. See flow sheets for details. Reported to JACEY Gates RN. Lt ua avf dressing clean, dry, intact.

## 2021-05-21 NOTE — PROGRESS NOTES
[facility] Medicine Daily Progress Note    Date of Service  5/21/2021    Chief Complaint  75 y.o. male admitted 5/20/2021 with weakness and dizziness    Hospital Course  No notes on file    Interval Problem Update  5/21: PATSY done and arterial ultrasound pending.  Patient feels okay with no fevers or chills so far.    Disposition  Pending arterial studies and LPS eval    Review of Systems  Review of Systems   Constitutional: Negative for chills and fever.   Respiratory: Negative for cough, shortness of breath and wheezing.    Cardiovascular: Negative for chest pain.   Gastrointestinal: Negative for constipation, diarrhea, nausea and vomiting.   Genitourinary: Negative for dysuria.   Neurological: Negative for headaches.   Psychiatric/Behavioral: The patient is nervous/anxious.         Physical Exam  Temp:  [35.9 °C (96.6 °F)-37.2 °C (98.9 °F)] 36.2 °C (97.2 °F)  Pulse:  [] 81  Resp:  [16-20] 20  BP: ()/(20-56) 103/56  SpO2:  [90 %-96 %] 93 %    Physical Exam  Constitutional:       General: He is not in acute distress.     Appearance: He is well-developed. He is not diaphoretic.   HENT:      Head: Normocephalic and atraumatic.      Right Ear: External ear normal.      Left Ear: External ear normal.      Mouth/Throat:      Pharynx: No oropharyngeal exudate or posterior oropharyngeal erythema.   Eyes:      General:         Right eye: No discharge.         Left eye: No discharge.      Extraocular Movements: Extraocular movements intact.   Cardiovascular:      Rate and Rhythm: Normal rate.      Heart sounds: No gallop.    Pulmonary:      Effort: No respiratory distress.      Breath sounds: No wheezing.   Abdominal:      General: There is no distension.      Tenderness: There is no abdominal tenderness. There is no guarding.   Musculoskeletal:      Comments: Right BKA   Skin:     General: Skin is warm.   Neurological:      Mental Status: He is alert and oriented to person, place, and time. Mental status is at  baseline.      Motor: No weakness.   Psychiatric:         Mood and Affect: Mood normal.         Behavior: Behavior normal.         Fluids    Intake/Output Summary (Last 24 hours) at 5/21/2021 1234  Last data filed at 5/20/2021 1626  Gross per 24 hour   Intake 900 ml   Output 1058 ml   Net -158 ml       Laboratory  Recent Labs     05/20/21  0647   WBC 13.0*   RBC 2.40*   HEMOGLOBIN 8.1*   HEMATOCRIT 24.7*   .9*   MCH 33.8*   MCHC 32.8*   RDW 59.0*   PLATELETCT 279   MPV 9.4     Recent Labs     05/20/21  0647   SODIUM 133*   POTASSIUM 5.3   CHLORIDE 89*   CO2 25   GLUCOSE 147*   BUN 52*   CREATININE 7.82*   CALCIUM 9.8                   Imaging  US-PATSY SINGLE LEVEL BILAT         DX-FOOT-2- LEFT   Final Result      1.  Soft tissue swelling of LEFT 4th toe.   2.  No gross bony destruction however osteomyelitis is not excluded by plain film.   3.  Prior partial amputation of great toe.      DX-CHEST-PORTABLE (1 VIEW)   Final Result      Left lower lobe pleural thickening and calcification may be related to sequelae of prior infection or trauma.      Patchy left basilar opacity may represent atelectasis or pneumonitis.      Mild cardiomegaly.      Atherosclerotic plaque.         US-EXTREMITY ARTERY LOWER BILAT    (Results Pending)        Assessment/Plan  * Cellulitis of left foot- (present on admission)  Assessment & Plan  One dose of vancomycin given in the ER  IV unasyn to continue    Ischemia of toe- (present on admission)  Assessment & Plan  Left 4th toe ischemia  Limb preservation service consulted  He likely will need amputation  Hold Eliquis for possible surgery.  PATSY abnormal.  Ultrasound pending    Hypotension- (present on admission)  Assessment & Plan  Chronic condition  Stop coreg  Continue home midodrine    End stage renal disease on dialysis (HCC)- (present on admission)  Assessment & Plan  Dialysis via left arm fistula T,Th,S  Dr. Hodges, nephrology was consulted from the ER    Type 2 diabetes  mellitus with kidney complication, with long-term current use of insulin (HCC)- (present on admission)  Assessment & Plan  With hyperglycemia  Continue Lantus and sliding scale  Diabetic diet    CAD (coronary artery disease)- (present on admission)  Assessment & Plan  With hx of stents followed by [facility]    DNR (do not resuscitate)- (present on admission)  Assessment & Plan  Discussed with Mr. Sepulveda and he confirmed this status.    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)- (present on admission)  Assessment & Plan  Right BKA and left great toe amputation    Cardiomyopathy, ischemic- (present on admission)  Assessment & Plan  EF from heart cath 5/10 15-20%    Peripheral vascular disease (HCC)- (present on admission)  Assessment & Plan  Hx of multiple amputations     Paroxysmal A-fib ([facility])- (present on admission)  Assessment & Plan  Chronic condition  Hold Eliquis in case he needs surgery and utilize SQ heparin for DVT prophylaxis    Hyponatremia- (present on admission)  Assessment & Plan  Mild, continue to monitor       VTE prophylaxis: sc hep

## 2021-05-22 NOTE — CARE PLAN
Assumed day shift care at start of shift  Patient a+o x 4  6/10 pain to left foot and left hand, oxycodone admin a/o with good effect, patient sleeping on reassessment  Vss, afebrile, iv abx a/o  Patient anuric  turns and repositions independently in bed  Av fistula to LUE with +bruit and +thrill  No needs at this time, wctm       Fall precautions/hourly rounding maintained, call light within reach and functioning, all items within reach.  Patient encouraged to call for assistance, poc reviewed with patient, ?'s/concerns answered.  Refuses bed alarm.     Problem: Pain - Standard  Goal: Alleviation of pain or a reduction in pain to the patient’s comfort goal  Outcome: Progressing   The patient is Stable - Low risk of patient condition declining or worsening    Shift Goals  Clinical Goals: Pain control  Patient Goals: Increase BP, Pain control

## 2021-05-22 NOTE — CONSULTS
"LIMB PRESERVATION SERVICE CONSULT      REFERRED BY: Kel Bustamante M.D.    DATE OF CONSULTATION: 5/21/2021    REASON FOR CONSULT: necrotic left 4th toe     HISTORY OF PRESENT ILLNESS: Luis Sepulveda is a 75 y.o.  with a past medical history that includes type 2 diabetes,  ESRD on hemodialysis, PAD, Afib, L great toe amputation with tenotomy, HLD, s/p right BKA (12/2020 with Dr. Lynn) admitted 5/20/2021 for Cellulitis of left foot [L03.116].   LPS has been consulted for evaluation of necrotic left 4th toe. Patient reports he has occasional \"twinges\" of pain to left 4-5 toes since approximately early April 2021. He states he was unaware of any wounds or discoloration of his left toes. He states he was discharged from rehab in early April 2020 and has a home health Rn that comes to his home weekly every Friday. He states the home health RN had provided him with a mirror to check his feet but he is unable to adequately check his feet due to vision deterioration. Pain is worse with touch, relieved with rest and non-weight bearing. Denies fever, chills, nausea, vomiting, CP, HA, SOB.    IV antibiotics were started on this admission.  Infectious diseases has not been consulted.    Xray completed and is negative for osteomyelitis.  Ortho has not been consulted yet.  Dr. Encinas from vascular has been consulted due to his previous care of patient.       Diagnosed with diabetes in 2000, and is currently managing with lantus.  Checks blood sugars 1 times per day and reports that these typically average 100s.  Has had previous diabetes education.  Does have numbness  Left foot.   He states the home health RN had provided him with a mirror to check his feet but he is unable to adequately check his feet due to vision deterioration. Does have diabetic shoes and inserts from [employer]. Has had previous foot surgeries including left 1 toe amputation, right bka, Left anterior tibial artery angioplasty, Percutaneous thrombectomy " of left peroneal artery, Left peroneal artery angioplasty with Dr. Encinas (04/2020).        Smoking:   reports that he quit smoking about 7 years ago. His smoking use included cigarettes. He has a 55.00 pack-year smoking history. He has never used smokeless tobacco.    Alcohol:   reports previous alcohol use.    Drug:   reports no history of drug use.      PAST MEDICAL HISTORY:   Past Medical History:   Diagnosis Date   • Arrhythmia     hx a fib   • Arthritis 12/29/2020    knees, ankles, back   • CAD (coronary artery disease)     stents   • Chronic anticoagulation 3/26/2020   • Chronic kidney disease (CKD), stage V (HCC)    • Congestive heart failure (HCC)     hx of   • Dental disorder 12/29/2020    partial upper   • Diabetes    • Hemodialysis patient (HCC)     Tuesday, Thursday, Saturday   • Hypertension    • Kidney failure    • Myocardial infarct ([facility])     ? 2019    • Osteoarthritis    • Peripheral neuropathy    • Pneumonia     per hx   • Sleep apnea     does not use cpap anymore        PAST SURGICAL HISTORY:   Past Surgical History:   Procedure Laterality Date   • KNEE AMPUTATION BELOW Right 12/31/2020    Procedure: AMPUTATION, BELOW KNEE ...;  Surgeon: Leobardo Lynn M.D.;  Location: [facility];  Service: Orthopedics   • TOE AMPUTATION Right 11/16/2020    Procedure: AMPUTATION, TOE GREAT;  Surgeon: Leobardo Lynn M.D.;  Location: [facility];  Service: Orthopedics   • TOE AMPUTATION Left 5/17/2020    Procedure: AMPUTATION, TOE GREAT;  Surgeon: Leobardo Lynn M.D.;  Location: [facility];  Service: Orthopedics   • TENOTOMY Left 5/17/2020    Procedure: FLEXOR TENOTOMIES, TWO-FIVE TOES;  Surgeon: Leobardo Lynn M.D.;  Location: [facility];  Service: Orthopedics   • APPENDECTOMY     • OTHER CARDIAC SURGERY      stents x1   • OTHER ORTHOPEDIC SURGERY      knee surgery       MEDICATIONS:   Current Facility-Administered Medications   Medication Dose   • sevelamer  "carbonate (RENVELA) tablet 2,400 mg  2,400 mg   • atorvastatin (LIPITOR) tablet 40 mg  40 mg   • calcitRIOL (ROCALTROL) capsule 0.25 mcg  0.25 mcg   • carvedilol (COREG) tablet 3.125 mg  3.125 mg   • clopidogrel (PLAVIX) tablet 75 mg  75 mg   • gabapentin (NEURONTIN) capsule 300 mg  300 mg   • insulin glargine (Semglee) injection  8 Units   • methimazole (TAPAZOLE) tablet 5 mg  5 mg   • midodrine (PROAMATINE) tablet 10 mg  10 mg   • tamsulosin (FLOMAX) capsule 0.8 mg  0.8 mg   • traZODone (DESYREL) tablet 50 mg  50 mg   • senna-docusate (PERICOLACE or SENOKOT S) 8.6-50 MG per tablet 2 tablet  2 tablet    And   • polyethylene glycol/lytes (MIRALAX) PACKET 1 Packet  1 Packet    And   • bisacodyl (DULCOLAX) suppository 10 mg  10 mg   • heparin injection 5,000 Units  5,000 Units   • acetaminophen (Tylenol) tablet 650 mg  650 mg   • ondansetron (ZOFRAN) syringe/vial injection 4 mg  4 mg   • ondansetron (ZOFRAN ODT) dispertab 4 mg  4 mg   • insulin regular (HumuLIN R,NovoLIN R) injection  2-9 Units    And   • glucose 4 g chewable tablet 16 g  16 g    And   • dextrose 50% (D50W) injection 50 mL  50 mL   • ampicillin/sulbactam (UNASYN) 3 g in  mL IVPB  3 g   • epoetin (Retacrit) injection (Dialysis Use Only) 4,000 Units  4,000 Units   • heparin injection 1,500 Units  1,500 Units   • oxyCODONE immediate-release (ROXICODONE) tablet 5 mg  5 mg   • guaiFENesin ER (MUCINEX) tablet 600 mg  600 mg       ALLERGIES:    Allergies   Allergen Reactions   • Mircera [Methoxy Polyethylene Glycol-Epoetin Beta] Hives   • Other Drug      \"Opiods\" caused  hallucinations        FAMILY HISTORY:   Family History   Problem Relation Age of Onset   • Heart Disease Mother    • Alcohol/Drug Father    • Thyroid Son    • Diabetes Brother    • Hypertension Neg Hx    • Hyperlipidemia Neg Hx          REVIEW OF SYSTEMS:   Constitutional: Negative for chills, fever   Respiratory: Negative for cough and shortness of breath.    Cardiovascular:Negative " for chest pain, and claudication.   Gastrointestinal: Negative for constipation, diarrhea, nausea and vomiting.   Lower extremities: positive for right bka, left 4-5 toe pain and discoloration  Neurological: positive for numbness to left foot and lower leg  All other systems reviewed and are negative     RESULTS:     Recent Labs     05/20/21  0647 05/21/21  1309   WBC 13.0* 7.2   RBC 2.40* 2.21*   HEMOGLOBIN 8.1* 7.6*   HEMATOCRIT 24.7* 23.1*   .9* 104.5*   MCH 33.8* 34.4*   MCHC 32.8* 32.9*   RDW 59.0* 61.2*   PLATELETCT 279 196   MPV 9.4 9.3     Recent Labs     05/20/21  0647   SODIUM 133*   POTASSIUM 5.3   CHLORIDE 89*   CO2 25   GLUCOSE 147*   BUN 52*         ESR:     none    CRP:       none this admission      COVID-19: Not detected this admission    X-ray:     Dx-foot-2-left 5/20/2021    FINDINGS:  Prior partial amputation of great toe at the base of proximal phalanx.  Soft tissue swelling of 4th toe.  No soft tissue gas.  Diffuse vascular calcifications.  No focal bony destruction.  Calcaneal enthesophytes.  Calcification dorsal to calcaneus, possibly within Achilles tendon, chronic.     IMPRESSION:     1.  Soft tissue swelling of LEFT 4th toe.  2.  No gross bony destruction however osteomyelitis is not excluded by plain film.  3.  Prior partial amputation of great toe.      MRI: none this admission    Arterial studies:   US-PATSY single level bilateral 5/21/2021                    RIGHT      Waveform            Systolic BPs (mmHg)                              120           Brachial                                            Common Femoral                                            Posterior Tibial                                            Dorsalis Pedis                                            Peroneal                                            PATSY                                            TBI                           LEFT   Waveform        Systolic BPs (mmHg)                                             Brachial   Triphasic                                Common Femoral   Monophasic                 127           Posterior Tibial   Monophasic                 45            Dorsalis Pedis                                            Peroneal                              1.06          PATSY                                            TBI         Findings   Right-   Ankle pressure not obtained due to below the knee amputation.    Bandage over the common femoral artery.    Doppler waveform of the popliteal artery is of high amplitude and    triphasic.       Left-   Ankle-brachial index of the dorsalis pedis artery is severely reduced.    Ankle-brachial index of the posterior tibial artery is normal.    Doppler waveform of the common femoral artery is of high amplitude and    triphasic.    Doppler waveforms at the ankle are monophasic with moderate amplitude.       PPG's-   No flow       Bilateral arterial duplex scan was performed in accordance with lower    extremity arterial evaluation protocol - see separate report.       US-extremity artery lower bilateral 5/21/2021     RIGHT   Waveform        Peak Systolic Velocity (cm/s)                   Prox    Prox-Mid  Mid    Mid-Dist  Distal   Triphasic                         52                       CFA         Biphasic        55                                         PFA         Biphasic        44                65      24       56      SFA         Biphasic                          35                       POP         Monophasic      62                                         AT         Monophasic      51                                         PT         Not                                                        LISA   attained                    LEFT   Waveform        Peak Systolic Velocity (cm/s)                   Prox    Prox-Mid  Mid    Mid-Dist  Distal   Triphasic                         132                      CFA         Biphasic        43                                          PFA         Biphasic        50                62               42      SFA         Triphasic                         56                       POP         Monophasic      48                                 70      AT         Monophasic      37                                 30      PT         Absent          0                                  54      LISA               FINDINGS   Right-   Calcific atherosclerotic plaque seen throughout the lower extremity.    Stenosis of the distal superficial femoral artery. Velocities are    consistent with 50-75% stenosis.    Proximal anterior and posterior tibial artery waveforms are monophasic.    Proximal peroneal artery was not visualized.       Left-   Calcific atherosclerotic plaque seen throughout the lower extremity.   Monophasic waveforms seen in the anterior and posterior tibial arteries.   Absent flow seen in the proximal peroneal artery.         A1c:  Lab Results   Component Value Date/Time    HBA1C 5.7 (H) 05/11/2021 06:30 AM            Microbiology:  Results     Procedure Component Value Units Date/Time    CULTURE WOUND W/ GRAM STAIN [[id number]]  (Abnormal) Collected: 05/20/21 0759    Order Status: Completed Specimen: Wound from Left Foot Updated: 05/21/21 1708     Significant Indicator POS     Source WND     Site LEFT FOOT     Culture Result Light growth usual skin xander.     Gram Stain Result Rare WBCs.  Many Gram positive cocci.  Few Gram negative rods.       Culture Result Klebsiella pneumoniae  Moderate growth      BLOOD CULTURE [[id number]] Collected: 05/20/21 0740    Order Status: Completed Specimen: Blood from Peripheral Updated: 05/21/21 0821     Significant Indicator NEG     Source BLD     Site PERIPHERAL     Culture Result No Growth  Note: Blood cultures are incubated for 5 days and  are monitored continuously.Positive blood cultures  are called to the RN and reported as soon as  they are identified.      Narrative:      Per Hospital Policy:  "Only change Specimen Src: to \"Line\" if  specified by physician order.  No site indicated    BLOOD CULTURE [[id number]] Collected: 05/20/21 0748    Order Status: Completed Specimen: Blood from Peripheral Updated: 05/21/21 0821     Significant Indicator NEG     Source BLD     Site PERIPHERAL     Culture Result No Growth  Note: Blood cultures are incubated for 5 days and  are monitored continuously.Positive blood cultures  are called to the RN and reported as soon as  they are identified.      Narrative:      Per Hospital Policy: Only change Specimen Src: to \"Line\" if  specified by physician order.  No site indicated    GRAM STAIN [[id number]] Collected: 05/20/21 0759    Order Status: Completed Specimen: Wound Updated: 05/20/21 1739     Significant Indicator .     Source WND     Site LEFT FOOT     Gram Stain Result Rare WBCs.  Many Gram positive cocci.  Few Gram negative rods.      SARS-CoV-2 PCR (24 hour In-House): Collect NP swab in VTM [[id number]] Collected: 05/20/21 0940    Order Status: Completed Specimen: Respirate Updated: 05/20/21 1259     SARS-CoV-2 Source NP Swab     SARS-CoV-2 by PCR NotDetected     Comment: PATIENTS: Important information regarding your results and instructions can  be found at [email or web address]   \"After your  Covid-19 Test\"    [facility] providers: PLEASE REFER TO DE-ESCALATION AND RETESTING PROTOCOL  on [email or web address]    **The TaqPath COVID-19 SARS-CoV-2 RT-PCR test has been made available for  use under the Emergency Use Authorization (EUA) only.         Narrative:      Have you been in close contact with a person who is suspected  or known to be positive for COVID-19 within the last 30 days  (e.g. last seen that person < 30 days ago)->No           PHYSICAL EXAMINATION:     VITAL SIGNS: /61   Pulse 72   Temp 36.4 °C (97.5 °F) (Temporal)   Resp 18   Ht 1.676 m (5' 6\")   Wt 75.8 kg (167 lb)   SpO2 94%   BMI 26.95 kg/m²       General Appearance:  Well " developed, well nourished, elderly in no acute distress      Lower Extremity Assessment:    Edema:   Minimal edema    ROM dorsi/plantarflexion   Dorsiflexion intact LLE    Structural /mechanical changes:   Right BKA  Left 1 toe amputation    Sensory Assessment  Feet Insensate to light touch to left foot  Right BKA sensation intact      Pulses:   palpable right popliteal pulse  L foot: unable to palpate left DP/PT pulses, soft monophasic DP/PT pulses via doppler.   Left popliteal pulse palpable.     Wound Assessment:    Left foot  Necrotic left 4th toe  Left 2,3,5, toes are mildly purple discolored and cool to touch.   Distal midfoot on dorsum of foot cool to touch.   Left 4-5 toes tender with light touch.   No erythema, edema, active drainage.     Left Foot        Wound care completed by APRN.      ASSESSMENT AND PLAN:   This is a 75 year old male with a hx of T2Dm, PAD, ESRD on HD, right BKA, Left anterior tibial artery angioplasty, Percutaneous thrombectomy of left peroneal artery, Left peroneal artery angioplasty with Dr. Encinas 04/2021. Patient has necrotic left 4th toe and left 2,3,5 toes are mildly purple discolored and cool to touch. Vascular studies were obtained showing decreased blood flow to LLE, no flow in left peroneal artery. Dr. Encinas was consulted to evaluate patient for revasc as well as surgical options. Dr. Encinas states his partner, Dr. Bee, will see patient 5/22/2021. Further plan of care pending at that time.       Wound care:   -Wound care orders placed for nursing  Left foot: may leave open to air.     Imaging/Labs:  -COVID-19: not detected this admission    Vascular status:   -Palpable right popliteal pulses.     Surgery:   -none at this time, pending vascular eval.   At risk for BKA    Antibiotics:   -Unasyn IVmanaged by hospitalist       Weight Bearing Status:   -Heel weight bearing LLE    Offloading:   Heel mepilex left heel.   -Orthotic company: [employer]    PT/OT:   -not at this  time      Diabetes Education:   - consult CDE and RD     - Implications of loss of protective sensation (LOPS) discussed with patient- including increased risk for amputation.  Advised to check feet at least daily, moisturize feet, and to always wear protective foot wear.   -avoid trimming own nails. See podiatrist or certified foot and nail RN  -keep blood sugars <150 for improved wound healing        D/W: pt, RN, Dr. Encinas, Dr. Gardner.       Discharge Plan:  Remain inpatient for ongoing medical care.     Follow up: TBD        Please note that this dictation was created using voice recognition software. I have  worked with technical experts from [employer] to optimize the interface.  I have made every reasonable attempt to correct obvious errors, but there may be errors of grammar and possibly content that I did not discover before finalizing the note.    Please contact LPS through [employer].

## 2021-05-22 NOTE — CONSULTS
Date of Service  5/22/2021    Reason For Consult  Left 4th toe ischemia in setting of known PAD    Requesting Physician  Dr Damon Gardner    Consulting Physician  Marlee Bee M.D.    Primary Care Physician  Patito Edgar M.D.    Chief Complaint  Left foot pain    History of Present Illness  75 y.o. male with a past medical history significant for A fib on Eliquis, ESRD on HD Tue/Thur/Sat via LUE AVF, CAD with recent ischemic cardiomyopathy s/p pci with residual defects, DM, who presented 5/20/2021 with complaints of left foot pain.    Much of this history is obtained via chart review as patient very lethargic during this visit.    At admission patient was found to have with necrotic L 4th toe. Pain in L 4/5 toes since April 2021. Unknown when gangrene started. Patient was started on IV vanc and Unasyn at admission.    · 5/20/21 Xray R Foot: no evidence of roberta destruction suggestive of osteo  · 5/21/21 [facility] PATSY: L PATSY 1.06 with monophasic signal.   · 5/21/21 [facility] Duplex:  · Right: Calcific atherosclerotic plaque seen throughout the lower extremity. Stenosis of the distal superficial femoral artery. Velocities are consistent with 50-75% stenosis. Proximal anterior and posterior tibial artery waveforms are monophasic. Proximal peroneal artery was not visualized.   · Left: Calcific atherosclerotic plaque seen throughout the lower extremity. Monophasic waveforms seen in the anterior and posterior tibial arteries. Absent flow seen in the proximal peroneal artery.     Patient has a history of   · LLE 1st toe wound s/p   · 4/24/20 NVV at [facility] with attempted atherectomy AT, pta of L AT and L peroneal and thrombectomy of peroneal due to clot formation despite heparnizaton of re-bolusing.  · Completion imaging: PT flush occlusion, rapid two-vessel inline flow to the foot via the AT and peroneal. The pedal arch is heavily diseased with significantly improved flow to the midfoot but scant  filling of the toes.)  · 11/6/20 L first metatarsal amputation by Dr Lynn  · 11/11/20 L toe wound was evaluated at [facility] and found to be stable in nature and plan was continue BMT (with dual antiplatelet) and follow up in 3 mo or sooner as needed  · RLE 1st toe gangrene s/p   · 10/14/20 [facility] angiogram, atherectomy and pta R distal pop, R TPT, peroneal, R PT.   · 11/11/20 Post op NIV at [facility] revealed patent R AT, TPT, peroneal and PT with biphasic flow  · Plan was for patient to follow with Dr. Lynn (as patient was under his care for this problem previously) for R great toe gangrene with erythema and follow up with [facility] in 3 months or sooner as needed  · 12/31/20 R BKA for osteo by Dr Zarate    Hospitalized 5/10/21-5/18/21 at [facility] with SOB and found to have high output heart failure with RVR and possible NSTEMI.   5/13 patient underwent LHC: Calcified ostial LAD and significant. stenosis is distal RCA at the ostium of the posterior descending artery. PTCA of the ostial LAD was performed but non-dilatable with due to calcification. Initially planed high risk, staged PCI of LAD with possible LV assist device on 5/17, but due to unclear benefit, no further procedure planned. Planned outpatient cards follow up.  During same hospitalization patient was noted to have persistent steal syndrome (LUE cyanosis) 2/2 L brachiocephalic AVF despite DRIL secondary to diffuse arterial insuffiencey and CHF. Dr Honeycutt presented option to ligate AVF and become catheter dependent. Patient elected to maintain AVF despite steal.  Palliative care consulted. Began hospice discussion, patient considering. Plan outpatient follow up.    Review of Systems  ROS otherwise negative by CMS and AMA criteria except as noted above.    Past Medical History  Past Medical History:   Diagnosis Date   • Arrhythmia     hx a fib   • Arthritis 12/29/2020    knees, ankles, back   • CAD (coronary artery disease)     stents   • Chronic anticoagulation  3/26/2020   • Chronic kidney disease (CKD), stage V (formerly [facility])    • Congestive heart failure (HCC)     hx of   • Dental disorder 12/29/2020    partial upper   • Diabetes    • Hemodialysis patient (formerly [facility])     Tuesday, Thursday, Saturday   • Hypertension    • Kidney failure    • Myocardial infarct (formerly [facility])     ? 2019    • Osteoarthritis    • Peripheral neuropathy    • Pneumonia     per hx   • Sleep apnea     does not use cpap anymore       Surgical History  Past Surgical History:   Procedure Laterality Date   • KNEE AMPUTATION BELOW Right 12/31/2020    Procedure: AMPUTATION, BELOW KNEE ...;  Surgeon: Leobardo Lynn M.D.;  Location: SURGERY [facility];  Service: Orthopedics   • TOE AMPUTATION Right 11/16/2020    Procedure: AMPUTATION, TOE GREAT;  Surgeon: Leobardo Lynn M.D.;  Location: SURGERY [facility];  Service: Orthopedics   • TOE AMPUTATION Left 5/17/2020    Procedure: AMPUTATION, TOE GREAT;  Surgeon: Leobardo Lynn M.D.;  Location: SURGERY [facility];  Service: Orthopedics   • TENOTOMY Left 5/17/2020    Procedure: FLEXOR TENOTOMIES, TWO-FIVE TOES;  Surgeon: Leobardo Lynn M.D.;  Location: SURGERY [facility];  Service: Orthopedics   • APPENDECTOMY     • OTHER CARDIAC SURGERY      stents x1   • OTHER ORTHOPEDIC SURGERY      knee surgery        Family History  Family History   Problem Relation Age of Onset   • Heart Disease Mother    • Alcohol/Drug Father    • Thyroid Son    • Diabetes Brother    • Hypertension Neg Hx    • Hyperlipidemia Neg Hx         Social History  Social History     Socioeconomic History   • Marital status:      Spouse name: Not on file   • Number of children: Not on file   • Years of education: Not on file   • Highest education level: Some college, no degree   Occupational History   • Not on file   Tobacco Use   • Smoking status: Former Smoker     Packs/day: 1.00     Years: 55.00     Pack years: 55.00     Types: Cigarettes     Quit date: 1/23/2014     Years since quitting:  7.3   • Smokeless tobacco: Never Used   Vaping Use   • Vaping Use: Never used   Substance and Sexual Activity   • Alcohol use: Not Currently   • Drug use: No   • Sexual activity: Not on file   Other Topics Concern   • Not on file   Social History Narrative   • Not on file     Social Determinants of Health     Financial Resource Strain:    • Difficulty of Paying Living Expenses:    Food Insecurity:    • Worried About Running Out of Food in the Last Year:    • Ran Out of Food in the Last Year:    Transportation Needs: Unmet Transportation Needs   • Lack of Transportation (Medical): Yes   • Lack of Transportation (Non-Medical): Yes   Physical Activity: Inactive   • Days of Exercise per Week: 0 days   • Minutes of Exercise per Session: 0 min   Stress: Stress Concern Present   • Feeling of Stress : To some extent   Social Connections: Socially Isolated   • Frequency of Communication with Friends and Family: More than three times a week   • Frequency of Social Gatherings with Friends and Family: More than three times a week   • Attends Jewish Services: Never   • Active Member of Clubs or Organizations: No   • Attends Club or Organization Meetings: Never   • Marital Status:    Intimate Partner Violence:    • Fear of Current or Ex-Partner:    • Emotionally Abused:    • Physically Abused:    • Sexually Abused:        Medications  Prior to Admission Medications   Prescriptions Last Dose Informant Patient Reported? Taking?   apixaban (ELIQUIS) 5mg Tab 5/19/2021 at PM Patient No No   Sig: Take 1 tablet by mouth 2 times a day. Indications: Thromboembolism secondary to Atrial Fibrillation   atorvastatin (LIPITOR) 40 MG Tab 5/19/2021 at PM Patient No No   Sig: Take 1 tablet by mouth at bedtime.   calcitRIOL (ROCALTROL) 0.25 MCG Cap 5/19/2021 at AM Patient No No   Sig: Take 1 capsule by mouth every day.   carvedilol (COREG) 3.125 MG Tab 5/19/2021 at PM Patient No No   Sig: Take 1 tablet by mouth 2 times a day with meals.  Hold if blood pressure <100 and heart rate <60   clopidogrel (PLAVIX) 75 MG Tab 5/19/2021 at AM Patient No No   Sig: Take 1 tablet by mouth every day.   gabapentin (NEURONTIN) 300 MG Cap 5/19/2021 at PM Patient No No   Sig: Take 1 capsule by mouth every day.   insulin glargine (LANTUS SOLOSTAR) 100 UNIT/ML Solution Pen-injector injection 5/19/2021 at PM Patient No No   Sig: Inject 8 Units under the skin at bedtime for 30 days.   methimazole (TAPAZOLE) 5 MG Tab 5/19/2021 at PM Patient No No   Sig: Take 1 tablet by mouth 2 times a day.   midodrine (PROAMATINE) 10 MG tablet 5/19/2021 at PM Patient No No   Sig: Take 1 tablet by mouth 3 times a day with meals.   omeprazole (PRILOSEC) 20 MG delayed-release capsule 5/19/2021 at PM Patient No No   Sig: Take 1 capsule by mouth 2 times a day.   oxyCODONE-acetaminophen (PERCOCET) 5-325 MG Tab PRN at PRN Patient No No   Sig: Take 1 tablet by mouth every 8 hours as needed for Severe Pain for up to 5 days.   sevelamer carbonate (RENVELA) 800 MG Tab tablet 5/19/2021 at PM Patient No No   Sig: Take 2 Tablets by mouth 3 times a day with meals.   tamsulosin (FLOMAX) 0.4 MG capsule 5/19/2021 at PM Patient No No   Sig: Take 2 Capsules by mouth at bedtime.   traZODone (DESYREL) 50 MG Tab 5/19/2021 at PM Patient No No   Sig: Take 1 tablet by mouth at bedtime.      Facility-Administered Medications: None       Current Facility-Administered Medications   Medication Dose Route Frequency Provider Last Rate Last Admin   • sevelamer carbonate (RENVELA) tablet 2,400 mg  2,400 mg Oral TID WITH MEALS TABATHA Rivera   2,400 mg at 05/22/21 0903   • atorvastatin (LIPITOR) tablet 40 mg  40 mg Oral QHS Kel Bustamante M.D.   40 mg at 05/21/21 2047   • calcitRIOL (ROCALTROL) capsule 0.25 mcg  0.25 mcg Oral DAILY Kel Bustamante M.D.   0.25 mcg at 05/22/21 0508   • carvedilol (COREG) tablet 3.125 mg  3.125 mg Oral BID WITH MEALS Kel Bustamante M.D.   3.125 mg at 05/22/21 0903   • clopidogrel  (PLAVIX) tablet 75 mg  75 mg Oral DAILY Kel Bustamante M.D.   75 mg at 05/22/21 0509   • gabapentin (NEURONTIN) capsule 300 mg  300 mg Oral DAILY Kel Bustamante M.D.   300 mg at 05/22/21 0509   • insulin glargine (Semglee) injection  8 Units Subcutaneous QHS Kel Bustamante M.D.   8 Units at 05/21/21 2047   • methimazole (TAPAZOLE) tablet 5 mg  5 mg Oral BID Kel Bustamante M.D.   5 mg at 05/22/21 0509   • midodrine (PROAMATINE) tablet 10 mg  10 mg Oral TID WITH MEALS Kel Bustamante M.D.   10 mg at 05/22/21 1103   • tamsulosin (FLOMAX) capsule 0.8 mg  0.8 mg Oral QHS Kel Bustamante M.D.   0.8 mg at 05/21/21 2046   • traZODone (DESYREL) tablet 50 mg  50 mg Oral QHS Kel Bustamante M.D.   50 mg at 05/21/21 2047   • senna-docusate (PERICOLACE or SENOKOT S) 8.6-50 MG per tablet 2 tablet  2 tablet Oral BID Kel Bustamante M.D.        And   • polyethylene glycol/lytes (MIRALAX) PACKET 1 Packet  1 Packet Oral QDAY PRN Kel Bustamante M.D.        And   • bisacodyl (DULCOLAX) suppository 10 mg  10 mg Rectal QDAY PRN Kel Bustamante M.D.       • heparin injection 5,000 Units  5,000 Units Subcutaneous Q8HRS Kel Bustamante M.D.   5,000 Units at 05/22/21 0509   • acetaminophen (Tylenol) tablet 650 mg  650 mg Oral Q6HRS PRN Kel Bustamante M.D.   650 mg at 05/21/21 2046   • ondansetron (ZOFRAN) syringe/vial injection 4 mg  4 mg Intravenous Q4HRS PRN Kel Bustamante M.D.       • ondansetron (ZOFRAN ODT) dispertab 4 mg  4 mg Oral Q4HRS PRN Kel Bustamante M.D.       • insulin regular (HumuLIN R,NovoLIN R) injection  2-9 Units Subcutaneous 4X/DAY ACHS Kel Bustamante M.D.        And   • glucose 4 g chewable tablet 16 g  16 g Oral Q15 MIN PRN Kel Bustamante M.D.   16 g at 05/22/21 1347    And   • dextrose 50% (D50W) injection 50 mL  50 mL Intravenous Q15 MIN PRN Kel Bustamante M.D.       • epoetin (Retacrit) injection (Dialysis Use Only) 4,000 Units  4,000 Units Intravenous TUE+THU+SAT Kulwant Hodges M.D.   4,000 Units at 05/22/21 1130   •  "heparin injection 1,500 Units  1,500 Units Intravenous DIALYSIS PRN Kulwant Hodges M.D.   1,500 Units at 05/22/21 0935   • oxyCODONE immediate-release (ROXICODONE) tablet 5 mg  5 mg Oral Q3HRS PRN Kel Bustamante M.D.   5 mg at 05/21/21 2055   • guaiFENesin ER (MUCINEX) tablet 600 mg  600 mg Oral Q12H PRN (RT) Kel Bustamante M.D.   600 mg at 05/20/21 1805       Allergies  Allergies   Allergen Reactions   • Mircera [Methoxy Polyethylene Glycol-Epoetin Beta] Hives   • Other Drug      \"Opiods\" caused  hallucinations         Physical Exam  Temp:  [36.2 °C (97.1 °F)-37.4 °C (99.4 °F)] 37.4 °C (99.4 °F)  Pulse:  [] 90  Resp:  [16-20] 20  BP: (102-163)/(42-86) 163/77  SpO2:  [88 %-100 %] 92 %    Pulse/Extremity Exam:    Femorals:        Right: palpable       Left palpable  Pedal Pulses:       Left DP monophasic, strong       Left PT absent    Wounds:   · UE: LUE AVF with weak thrill (of note, patient's SBP 80). No palpable L radial pulse.   · LE: Bilateal lower extremities warm  · Well healed R BKA.  · Left 4th toe gangrene, moderately tender. Left 5th toe kissing ulceration, mildly tender. No erythema, no exudate, no crepitus, no fluctuance.     General appearance: NAD, lethargic, obese  HEENT: moist mucous membrances   Neck: trachea midline  Lungs: No inspiratory stridor or wheezing  CV: RRR  Abdomen: Soft, NT/ND  Skin: No rashes    Labs Reviewed Today:  Recent Labs     05/20/21  0647 05/21/21  1309 05/22/21  0329   WBC 13.0* 7.2 8.2   RBC 2.40* 2.21* 2.30*   HEMOGLOBIN 8.1* 7.6* 7.8*   HEMATOCRIT 24.7* 23.1* 22.9*   .9* 104.5* 99.6*   MCH 33.8* 34.4* 33.9*   MCHC 32.8* 32.9* 34.1   RDW 59.0* 61.2* 56.9*   PLATELETCT 279 196 253   MPV 9.4 9.3 9.7     Recent Labs     05/20/21  0647 05/22/21  0329   SODIUM 133* 136   POTASSIUM 5.3 4.4   CHLORIDE 89* 95*   CO2 25 27   GLUCOSE 147* 121*   BUN 52* 36*   CREATININE 7.82* 6.13*   CALCIUM 9.8 9.2     Recent Labs     05/20/21  0647 05/22/21  0329   ALTSGPT 11 8   "   ASTSGOT 8* 11*   ALKPHOSPHAT 120* 94   TBILIRUBIN 0.4 0.3   GLUCOSE 147* 121*                 No results for input(s): TROPONINI in the last 72 hours.    Urinalysis:    No results found     Imaging Reviewed Today:  I personally reviewed all non-invasive vascular testing including images, x-rays, tracings, arterial waveforms, and duplex exams relevant to this admission. My interpretation is below:    5/21/21 [facility] PATSY: Reports L 1.06 with monophasic waveforms. Likely falsely elevated due to medial wall calcification and non-compressibility given monophasic waveforms and no obtainable LE digital PPG waveforms    5/21/21 [facility] Arterial Duplex:  · Right [place] extremity Report: Calcific atherosclerotic plaque seen throughout the lower extremity. Stenosis of the distal superficial femoral artery. Velocities are consistent with 50-75% stenosis. Proximal anterior and posterior tibial artery waveforms are monophasic. Proximal peroneal artery was not visualized.   · Calcific vessel walls throughout RLE without evidence of hemodynamically significant stenosis  · Left Lower Extremity Report: Calcific atherosclerotic plaque seen throughout the lower extremity. Monophasic waveforms seen in the anterior and posterior tibial arteries. Absent flow seen in the proximal peroneal artery.  · Calcific vessel walls throughout LLE. Left CFA, SFA, Prof, pop without hemodynamically. Monophasic waveforms throughout L PT and AT. Proximal L peroneal occlusion with distal recon.      Assessment/Plan & Medical Decision-Making  75 y.o. male with a past medical history significant for A fib on Eliquis, ESRD on HD Tue/Thur/Sat via LUE AVF, CAD with recent ischemic cardiomyopathy s/p pci with residual defects, DM, who presented 5/20/2021 with left lower extremity (LLE) critical limb ischemia (CLI)      Unable to have a meaningful conversation with patient today given how lethargic he was. RN reports that he had dialysis today and has been vry  tired since.    LLE CLI - Dry gangrene  No evidence of wet grangrene or cellulitis  Given L 4th toe gangrene in setting on non-palpable pulses with monophasic waveforms the patient likely has CLI  L PATSY is likely falsely elevated dut to medial wall calcification.  In patient's with CLI we consider angiogram with intent to treat given high risk of limb loss.  However, patient's multiple medical problems (very recent NSTEMI with residual CAD, EF 15-20%, ESRD on HD, DM) place him at high risk for intervention.  Furthermore, during patient's recent hospitalization he expressed interest in hospice during discussion with Palliative Care  Discussed with Dr Gardner and he agrees patient would benefit from Palliative Care consultation to discuss goals of care and possible hospice prior to vascular surgery intervention  Agree with LPS consultation, appreciate reccs    Cellulitis LLE - resolved  No evidence of osteo on Xray  Appreciate ID reccs  · The positive ESBL culture is from the skin, no indication to cover at this time  · Admission documentation notes some swelling and cellulitis of the foot, this appears to have significantly improved with Unasyn, currently no evidence of cellulitis.  Will hold any further antibiotics to increase surgical tissue culture yield  · Vascular surgery consulted for possible intervention.  Plan is for fourth toe amputation but uncertain what level amputation will need to be performed given poor vasculature.  This is to be determined  · If any appearance of erythema proximal to the necrotic fourth toe, would restart IV Unasyn    Peripheral Artery Disease  As above and:  Continue best Medical therapy with: antiplatelet, statin (moderate to high intensity as tolerated), BP control (< 140/90), glucose control (A1C < 7), smoking cessation.    ESRD on HD via LUE AVF s/p DRILwith residual Steal  Patient with L hand tenderness  Tolerating HD  Management per Dr Honeycutt    DM  A1C 5.7  On insulin  Recc A1C  < 7 for PAD  Continue management per primary team    Hypotension  Chronic in nature   On midodrine  Holding BB  Management per primary team    Paroxysmal A fib  On home Eliquis - primary team holding in case of need for surgery  Management per primary team    Goals of Care  See above  Agree with Palliative Care Consultation      Marlee Bee MD, RPVI  Vascular Surgeon  [facility]  Office: [phone]

## 2021-05-22 NOTE — PROGRESS NOTES
[facility] Nephrology Daily Progress Note    Date of Service  5/22/2021    Author: Madison ECKERT, CNN-NP  Collaborating Physician: Dr. Anthony       Chief Complaint  76 y/o man with ESRD HD T,TH,Sat at [place] presented with hypotension and inability to HD. On routine labs noted to have hyperkalemia.  Pt has increased erythema of 4th toe on left with some spreading erythema. Pt reports that this is worsening over the last few weeks, but though it secondary to trauma.     No F/C/N/V/CP/SOB.  No melena, hematochezia, hematemesis.  No HA, visual changes, or abdominal pain.    Daily Nephrology Summary:   5/20- consult done  5/21 - sitting up in bed, feeling good, appetite good, mild non-productive cough, tolerated HD yesterday UF: 158ml  5/22 - seen on HD, some hypotension this am, limited UF with HD, vascular to consult     Review of Systems  Review of Systems   Constitutional: Negative.    HENT: Negative.    Eyes: Negative.    Respiratory: Positive for cough.    Cardiovascular: Negative.    Gastrointestinal: Negative.    Genitourinary: Negative.    Musculoskeletal: Positive for joint pain.   Skin: Negative.    Neurological: Negative.    Psychiatric/Behavioral: Negative.         Physical Exam  Temp:  [36.2 °C (97.1 °F)-37.4 °C (99.4 °F)] 37.4 °C (99.4 °F)  Pulse:  [] 90  Resp:  [16-20] 20  BP: (102-163)/(42-86) 163/77  SpO2:  [88 %-100 %] 92 %    Physical Exam  Constitutional:       Appearance: Normal appearance. He is normal weight.   HENT:      Head: Normocephalic and atraumatic.      Nose: Nose normal.      Mouth/Throat:      Mouth: Mucous membranes are dry.      Pharynx: Oropharynx is clear.   Eyes:      Extraocular Movements: Extraocular movements intact.      Conjunctiva/sclera: Conjunctivae normal.      Pupils: Pupils are equal, round, and reactive to light.   Cardiovascular:      Rate and Rhythm: Normal rate and regular rhythm.      Pulses: Normal pulses.      Heart sounds: Normal heart  sounds.   Pulmonary:      Effort: Pulmonary effort is normal.      Breath sounds: Normal breath sounds.   Abdominal:      General: Abdomen is flat. Bowel sounds are normal.      Palpations: Abdomen is soft.   Musculoskeletal:         General: Normal range of motion.      Cervical back: Normal range of motion and neck supple.      Comments: L 3rd toe black  RUE AVF    Skin:     General: Skin is warm and dry.      Capillary Refill: Capillary refill takes more than 3 seconds.   Neurological:      General: No focal deficit present.      Mental Status: He is alert and oriented to person, place, and time. Mental status is at baseline.   Psychiatric:         Mood and Affect: Mood normal.         Behavior: Behavior normal.         Thought Content: Thought content normal.         Judgment: Judgment normal.         Fluids    Intake/Output Summary (Last 24 hours) at 5/22/2021 1331  Last data filed at 5/22/2021 1300  Gross per 24 hour   Intake 1240 ml   Output 200 ml   Net 1040 ml       Laboratory  Recent Labs     05/20/21  0647 05/21/21  1309 05/22/21  0329   WBC 13.0* 7.2 8.2   RBC 2.40* 2.21* 2.30*   HEMOGLOBIN 8.1* 7.6* 7.8*   HEMATOCRIT 24.7* 23.1* 22.9*   .9* 104.5* 99.6*   MCH 33.8* 34.4* 33.9*   MCHC 32.8* 32.9* 34.1   RDW 59.0* 61.2* 56.9*   PLATELETCT 279 196 253   MPV 9.4 9.3 9.7     Recent Labs     05/20/21  0647 05/22/21  0329   SODIUM 133* 136   POTASSIUM 5.3 4.4   CHLORIDE 89* 95*   CO2 25 27   GLUCOSE 147* 121*   BUN 52* 36*   CREATININE 7.82* 6.13*   CALCIUM 9.8 9.2         No results for input(s): NTPROBNP in the last 72 hours.        Imaging       Assessment/Plan  # ESRD   normally T/T/S via   AVF. Left arm   # Hypotension: cardiogenic vs toe/cellultis   midodrine  #PVD with left 4th great toe discoloration   Per vascular    abx   # Cardiomyopathy: acute worsening. EF now 15-20%  # 80% ostial LAD in-stent restenosis:   # Steal syndrome: s/p DRI. Worsening apparent ischmia in setting of heart failure  #  Anemia in CKD   Iron replete    ARMANDO   # Atrial fibrillation   plavix    BB  # Diabetes mellitus  # Hyperthyroidism   Tapazole  #Hyponatremia, resolved    UF with HD   # Hyperkalemia, corrected    Manage with HD   #Hyperphosphatemia   sevelamer TID with meals   # BMD-CKD   Calcitriol   Sevelamer      Plan  HD q TThS and prn  UF with HD as tolerated   Vasc surgery eval of toe  DC IVFs, okay to bolus PRN symptomatic hypotension   Increase sevelamer   Renal dose ABX  Epo

## 2021-05-22 NOTE — PROGRESS NOTES
Hospital Medicine Daily Progress Note    Date of Service  5/22/2021    Chief Complaint  75 y.o. male admitted 5/20/2021 with weakness and dizziness    Hospital Course  No notes on file    Interval Problem Update  5/21: PATSY done and arterial ultrasound pending.  Patient feels okay with no fevers or chills so far.  5/22: Find of some loose stools.  Culture started growing ESBL.  Consulted ID Dr. Love.    Disposition  Pending arterial studies and LPS eval    Review of Systems  Review of Systems   Constitutional: Negative for chills and fever.   Respiratory: Negative for cough, shortness of breath and wheezing.    Cardiovascular: Negative for chest pain.   Gastrointestinal: Positive for diarrhea (loose Stools). Negative for constipation, nausea and vomiting.   Genitourinary: Negative for dysuria.   Musculoskeletal: Positive for joint pain.   Neurological: Negative for headaches.   Psychiatric/Behavioral: The patient is nervous/anxious.         Physical Exam  Temp:  [36.4 °C (97.5 °F)-37.2 °C (98.9 °F)] 36.7 °C (98.1 °F)  Pulse:  [] 108  Resp:  [16-19] 18  BP: (102-138)/(42-86) 138/86  SpO2:  [88 %-99 %] 93 %    Physical Exam  Constitutional:       General: He is not in acute distress.     Appearance: He is well-developed. He is not diaphoretic.   HENT:      Head: Normocephalic and atraumatic.      Right Ear: External ear normal.      Left Ear: External ear normal.      Mouth/Throat:      Pharynx: No oropharyngeal exudate or posterior oropharyngeal erythema.   Eyes:      General:         Right eye: No discharge.         Left eye: No discharge.      Extraocular Movements: Extraocular movements intact.   Cardiovascular:      Rate and Rhythm: Normal rate.      Heart sounds: No gallop.    Pulmonary:      Effort: No respiratory distress.      Breath sounds: No wheezing.   Abdominal:      General: There is no distension.      Tenderness: There is no abdominal tenderness. There is no guarding.   Musculoskeletal:          General: Tenderness present.      Comments: Right BKA   Skin:     General: Skin is warm.   Neurological:      Mental Status: He is alert and oriented to person, place, and time. Mental status is at baseline.      Motor: No weakness.   Psychiatric:         Mood and Affect: Mood normal.         Behavior: Behavior normal.         Fluids    Intake/Output Summary (Last 24 hours) at 5/22/2021 1049  Last data filed at 5/21/2021 1400  Gross per 24 hour   Intake 240 ml   Output --   Net 240 ml       Laboratory  Recent Labs     05/20/21  0647 05/21/21  1309 05/22/21  0329   WBC 13.0* 7.2 8.2   RBC 2.40* 2.21* 2.30*   HEMOGLOBIN 8.1* 7.6* 7.8*   HEMATOCRIT 24.7* 23.1* 22.9*   .9* 104.5* 99.6*   MCH 33.8* 34.4* 33.9*   MCHC 32.8* 32.9* 34.1   RDW 59.0* 61.2* 56.9*   PLATELETCT 279 196 253   MPV 9.4 9.3 9.7     Recent Labs     05/20/21  0647 05/22/21  0329   SODIUM 133* 136   POTASSIUM 5.3 4.4   CHLORIDE 89* 95*   CO2 25 27   GLUCOSE 147* 121*   BUN 52* 36*   CREATININE 7.82* 6.13*   CALCIUM 9.8 9.2                   Imaging  US-EXTREMITY ARTERY LOWER BILAT   Final Result      US-PATSY SINGLE LEVEL BILAT   Final Result      DX-FOOT-2- LEFT   Final Result      1.  Soft tissue swelling of LEFT 4th toe.   2.  No gross bony destruction however osteomyelitis is not excluded by plain film.   3.  Prior partial amputation of great toe.      DX-CHEST-PORTABLE (1 VIEW)   Final Result      Left lower lobe pleural thickening and calcification may be related to sequelae of prior infection or trauma.      Patchy left basilar opacity may represent atelectasis or pneumonitis.      Mild cardiomegaly.      Atherosclerotic plaque.              Assessment/Plan  * Cellulitis of left foot- (present on admission)  Assessment & Plan  One dose of vancomycin given in the ER  Switched to IV Unasyn  On 5/22, foot wound culture grew ESBL  ID Dr. Love consulted  Antibiotics per ID    Ischemia of toe- (present on admission)  Assessment &  Plan  Left 4th toe ischemia  Limb preservation service consulted  He likely will need amputation  Hold Eliquis for possible surgery.  PATSY abnormal.  Ultrasound severe arterial occlusion  Surgery consulted by LPS    Hypotension- (present on admission)  Assessment & Plan  Chronic condition  Stop coreg  Continue home midodrine    End stage renal disease on dialysis (HCC)- (present on admission)  Assessment & Plan  Dialysis via left arm fistula T,Th,S  Dr. Hodges, nephrology was consulted from the ER    Type 2 diabetes mellitus with kidney complication, with long-term current use of insulin ([facility])- (present on admission)  Assessment & Plan  With hyperglycemia  Continue Lantus and sliding scale  Diabetic diet    CAD (coronary artery disease)- (present on admission)  Assessment & Plan  With hx of stents followed by [facility]    DNR (do not resuscitate)- (present on admission)  Assessment & Plan  Discussed with Mr. Sepulveda and he confirmed this status.    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)- (present on admission)  Assessment & Plan  Right BKA and left great toe amputation    Cardiomyopathy, ischemic- (present on admission)  Assessment & Plan  EF from heart cath 5/10 15-20%    Peripheral vascular disease (HCC)- (present on admission)  Assessment & Plan  Hx of multiple amputations     Paroxysmal A-fib ([facility])- (present on admission)  Assessment & Plan  Chronic condition  Hold Eliquis in case he needs surgery and utilize SQ heparin for DVT prophylaxis    Hyponatremia- (present on admission)  Assessment & Plan  Mild, continue to monitor       VTE prophylaxis: sc hep

## 2021-05-22 NOTE — CARE PLAN
The patient is Stable - Low risk of patient condition declining or worsening    Shift Goals  Clinical Goals: Pain control  Patient Goals: Increase BP, Pain control    Progress made toward(s) clinical / shift goals:  Went over POC.    Patient is not progressing towards the following goals:

## 2021-05-22 NOTE — CONSULTS
[facility] INFECTIOUS DISEASES INPATIENT CONSULT NOTE     Date of Service: 5/22/2021    Consult Requested By: Damon Gardner M.D.    Reason for Consultation: ESBL in a culture    Chief Complaint: Necrotic toe    History of Present Illness:     Luis Sepulveda is a 75 y.o. male admitted 5/20/2021.  Patient with known type 2 diabetes, ESRD on HD, peripheral arterial disease, A. fib, history of left great toe amputation due to osteomyelitis in May 2020, hyperlipidemia, status post right BKA in December 2020, CAD with recent ischemic cardiomyopathy, admitted on 5/20/2021 with left foot pain, found to have a necrotic appearing left fourth toe.  He states that since April of this year, he has had some pain to his left fourth and fifth toes, but does not know if he had any wounds or if they were discolored.  He has not been able to monitor his toes due to vision deterioration.  X-ray without any obvious bony destruction.  No fevers or chills.  Left foot and distal leg, thus was also started on IV vancomycin and Unasyn.     APS was consulted.  Vascular studies showed decreased blood flow to the left lower extremity, no flow in the left peroneal artery.  Vascular surgery is being consulted.    A superficial swab was obtained which is growing ESBL Klebsiella.  ID consulted for recommendations.    Review of Systems:  All other systems reviewed and are negative expect as noted in HPI    Past Medical History:   Diagnosis Date   • Arrhythmia     hx a fib   • Arthritis 12/29/2020    knees, ankles, back   • CAD (coronary artery disease)     stents   • Chronic anticoagulation 3/26/2020   • Chronic kidney disease (CKD), stage V (HCC)    • Congestive heart failure (HCC)     hx of   • Dental disorder 12/29/2020    partial upper   • Diabetes    • Hemodialysis patient (HCC)     Tuesday, Thursday, Saturday   • Hypertension    • Kidney failure    • Myocardial infarct (HCC)     ? 2019    • Osteoarthritis    • Peripheral neuropathy    •  Pneumonia     per hx   • Sleep apnea     does not use cpap anymore       Past Surgical History:   Procedure Laterality Date   • KNEE AMPUTATION BELOW Right 2020    Procedure: AMPUTATION, BELOW KNEE ...;  Surgeon: Leobardo Lynn M.D.;  Location: SURGERY [facility];  Service: Orthopedics   • TOE AMPUTATION Right 2020    Procedure: AMPUTATION, TOE GREAT;  Surgeon: Leobardo Lynn M.D.;  Location: SURGERY [facility];  Service: Orthopedics   • TOE AMPUTATION Left 2020    Procedure: AMPUTATION, TOE GREAT;  Surgeon: Leobardo Lynn M.D.;  Location: SURGERY [facility];  Service: Orthopedics   • TENOTOMY Left 2020    Procedure: FLEXOR TENOTOMIES, TWO-FIVE TOES;  Surgeon: Leobardo Lynn M.D.;  Location: SURGERY [facility];  Service: Orthopedics   • APPENDECTOMY     • OTHER CARDIAC SURGERY      stents x1   • OTHER ORTHOPEDIC SURGERY      knee surgery       Family History   Problem Relation Age of Onset   • Heart Disease Mother    • Alcohol/Drug Father    • Thyroid Son    • Diabetes Brother    • Hypertension Neg Hx    • Hyperlipidemia Neg Hx        Social History     Socioeconomic History   • Marital status:      Spouse name: Not on file   • Number of children: Not on file   • Years of education: Not on file   • Highest education level: Some college, no degree   Occupational History   • Not on file   Tobacco Use   • Smoking status: Former Smoker     Packs/day: 1.00     Years: 55.00     Pack years: 55.00     Types: Cigarettes     Quit date: 2014     Years since quittin.3   • Smokeless tobacco: Never Used   Vaping Use   • Vaping Use: Never used   Substance and Sexual Activity   • Alcohol use: Not Currently   • Drug use: No   • Sexual activity: Not on file   Other Topics Concern   • Not on file   Social History Narrative   • Not on file     Social Determinants of Health     Financial Resource Strain:    • Difficulty of Paying Living Expenses:    Food Insecurity:    • Worried  "About Running Out of Food in the Last Year:    • Ran Out of Food in the Last Year:    Transportation Needs: Unmet Transportation Needs   • Lack of Transportation (Medical): Yes   • Lack of Transportation (Non-Medical): Yes   Physical Activity: Inactive   • Days of Exercise per Week: 0 days   • Minutes of Exercise per Session: 0 min   Stress: Stress Concern Present   • Feeling of Stress : To some extent   Social Connections: Socially Isolated   • Frequency of Communication with Friends and Family: More than three times a week   • Frequency of Social Gatherings with Friends and Family: More than three times a week   • Attends [employer] Services: Never   • Active Member of Clubs or Organizations: No   • Attends Club or Organization Meetings: Never   • Marital Status:    Intimate Partner Violence:    • Fear of Current or Ex-Partner:    • Emotionally Abused:    • Physically Abused:    • Sexually Abused:        Allergies   Allergen Reactions   • Mircera [Methoxy Polyethylene Glycol-Epoetin Beta] Hives   • Other Drug      \"Opiods\" caused  hallucinations       Medications:    Current Facility-Administered Medications:   •  sevelamer carbonate (RENVELA) tablet 2,400 mg, 2,400 mg, Oral, TID WITH MEALS, ANDREY RiveraP.R.ANG, 2,400 mg at 05/22/21 0903  •  atorvastatin (LIPITOR) tablet 40 mg, 40 mg, Oral, QHS, Kel Bustamante M.D., 40 mg at 05/21/21 2047  •  calcitRIOL (ROCALTROL) capsule 0.25 mcg, 0.25 mcg, Oral, DAILY, Kel Bustamante M.D., 0.25 mcg at 05/22/21 0508  •  carvedilol (COREG) tablet 3.125 mg, 3.125 mg, Oral, BID WITH MEALS, Kel Bustamante M.D., 3.125 mg at 05/22/21 0903  •  clopidogrel (PLAVIX) tablet 75 mg, 75 mg, Oral, DAILY, Kel Bustamante M.D., 75 mg at 05/22/21 0509  •  gabapentin (NEURONTIN) capsule 300 mg, 300 mg, Oral, DAILY, Kel Bustamante M.D., 300 mg at 05/22/21 0509  •  insulin glargine (Semglee) injection, 8 Units, Subcutaneous, QHS, Kel Bustamante M.D., 8 Units at 05/21/21 2047  •  " methimazole (TAPAZOLE) tablet 5 mg, 5 mg, Oral, BID, Kel Bustamante M.D., 5 mg at 05/22/21 0509  •  midodrine (PROAMATINE) tablet 10 mg, 10 mg, Oral, TID WITH MEALS, Kel Bustamante M.D., 10 mg at 05/22/21 0903  •  tamsulosin (FLOMAX) capsule 0.8 mg, 0.8 mg, Oral, QHS, Kel Bustamante M.D., 0.8 mg at 05/21/21 2046  •  traZODone (DESYREL) tablet 50 mg, 50 mg, Oral, QHS, Kel Bustamante M.D., 50 mg at 05/21/21 2047  •  senna-docusate (PERICOLACE or SENOKOT S) 8.6-50 MG per tablet 2 tablet, 2 tablet, Oral, BID **AND** polyethylene glycol/lytes (MIRALAX) PACKET 1 Packet, 1 Packet, Oral, QDAY PRN **AND** [DISCONTINUED] magnesium hydroxide (MILK OF MAGNESIA) suspension 30 mL, 30 mL, Oral, QDAY PRN **AND** bisacodyl (DULCOLAX) suppository 10 mg, 10 mg, Rectal, QDAY PRN, Kel Bustamante M.D.  •  heparin injection 5,000 Units, 5,000 Units, Subcutaneous, Q8HRS, Kel Bustamante M.D., 5,000 Units at 05/22/21 0509  •  acetaminophen (Tylenol) tablet 650 mg, 650 mg, Oral, Q6HRS PRN, Kel Bustamante M.D., 650 mg at 05/21/21 2046  •  ondansetron (ZOFRAN) syringe/vial injection 4 mg, 4 mg, Intravenous, Q4HRS PRN, Kel Bustamante M.D.  •  ondansetron (ZOFRAN ODT) dispertab 4 mg, 4 mg, Oral, Q4HRS PRN, Kel Bustamante M.D.  •  insulin regular (HumuLIN R,NovoLIN R) injection, 2-9 Units, Subcutaneous, 4X/DAY ACHS **AND** POC blood glucose manual result, , , Q AC AND BEDTIME(S) **AND** NOTIFY MD and PharmD, , , Once **AND** glucose 4 g chewable tablet 16 g, 16 g, Oral, Q15 MIN PRN **AND** dextrose 50% (D50W) injection 50 mL, 50 mL, Intravenous, Q15 MIN PRN, Kel Bustamante M.D.  •  ampicillin/sulbactam (UNASYN) 3 g in  mL IVPB, 3 g, Intravenous, Q24HRS, Kel Bustamante M.D., Stopped at 05/21/21 1636  •  epoetin (Retacrit) injection (Dialysis Use Only) 4,000 Units, 4,000 Units, Intravenous, TUE+THU+SAT, Kulwant Hodges M.D., 4,000 Units at 05/20/21 1536  •  heparin injection 1,500 Units, 1,500 Units, Intravenous, DIALYSIS PRN, Kulwant LAST  "TK Hodges, 1,500 Units at 21 0935  •  oxyCODONE immediate-release (ROXICODONE) tablet 5 mg, 5 mg, Oral, Q3HRS PRN, Kel Bustamante M.D., 5 mg at 21  •  guaiFENesin ER (MUCINEX) tablet 600 mg, 600 mg, Oral, Q12H PRN (RT), Kel Bustamante M.D., 600 mg at 21 1805    Physical Exam:   Vital Signs: /86   Pulse 81   Temp 36.2 °C (97.1 °F) (Temporal)   Resp 18   Ht 1.676 m (5' 6\")   Wt 75.8 kg (167 lb)   SpO2 93%   BMI 26.95 kg/m²   Temp  Av.7 °C (98 °F)  Min: 35.9 °C (96.6 °F)  Max: 37.3 °C (99.1 °F)  Vital signs reviewed  Constitutional: Patient is oriented to person, place, and time. Appears in no acute distress  Head: Atraumatic, normocephalic  Eyes: Conjunctivae normal, EOM intact.  Mouth/Throat: Wearing a mask  Neck: Neck supple. No masses/lymphadenopathy  Cardiovascular: Normal rate, regular rhythm, normal S1S2 and intact distal pulses. No murmur, gallop, or friction rub. No pedal edema.  Pulmonary/Chest: No respiratory distress. Unlabored respiratory effort, lungs clear to auscultation. No wheezes or rales.   Abdominal: Soft, non tender. BS + x 4. No masses or hepatosplenomegaly.   Musculoskeletal: Left upper extremity AV fistula, currently receiving dialysis.  Left fourth toe with darkish discoloration and significant tenderness, no extension of erythema or swelling beyond this toe  Neurological: Alert and oriented to person, place, and time. No gross cranial nerve deficit. No focal neural deficit noted  Skin: As above  Psychiatric: Normal mood and affect. Behavior is normal.     LABS:  Recent Labs     21  0647 21  1309 21  0329   WBC 13.0* 7.2 8.2      Recent Labs     21  0647 21  1309 21  0329   HEMOGLOBIN 8.1* 7.6* 7.8*   HEMATOCRIT 24.7* 23.1* 22.9*   .9* 104.5* 99.6*   MCH 33.8* 34.4* 33.9*   PLATELETCT 279 196 253       Recent Labs     21  0647 21  0329   SODIUM 133* 136   POTASSIUM 5.3 4.4   CHLORIDE 89* 95* "   CO2 25 27   CREATININE 7.82* 6.13*        Recent Labs     05/20/21  0647 05/22/21  0329   ALBUMIN 4.1 3.3        MICRO:  Blood Culture   Date Value Ref Range Status   01/24/2014   Final    Blood culture testing and Gram stain, if indicated, are  performed at [facility], [address] [place] [address].  Positive blood cultures are  sent to [facility], [address] [place] [address], for organism identification and  susceptibility testing.  No growth after 5 days of incubation.     Blood Culture Hold   Date Value Ref Range Status   11/28/2019 Collected  Final        Latest pertinent labs were reviewed    IMAGING STUDIES:  As above    Hospital Course/Assessment:   Luis Sepulveda is a 75 y.o. male with  with known type 2 diabetes, ESRD on HD, peripheral arterial disease, A. fib, history of left great toe amputation due to osteomyelitis in May 2020, hyperlipidemia, status post right BKA in December 2020, CAD with recent ischemic cardiomyopathy, admitted on 5/20/2021 with left foot pain, found to have a necrotic appearing left fourth toe - ischemia +/- superimposed infection.    Pertinent Diagnoses:  Necrotic left fourth toe  Type 2 diabetes  ESRD on HD  History of left great toe osteomyelitis  Status post right BKA  CAD  Ischemic cardiomyopathy    Plan:   -The positive ESBL culture is from the skin, no indication to cover at this time  -Admission documentation notes some swelling and cellulitis of the foot, this appears to have significantly improved with Unasyn, currently no evidence of cellulitis.  Will hold any further antibiotics to increase surgical tissue culture yield  -Vascular surgery consulted for possible intervention.  Plan is for fourth toe amputation but uncertain what level amputation will need to be performed given poor vasculature.  This is to be determined  -If any appearance of erythema proximal to the necrotic fourth toe, would restart IV  Unasyn    Plan was discussed with the primary team, Dr. Gardner and MJ Blackmon, BABAR    ID will follow. Please feel free to call with questions.    Aung Love M.D.    Please note that this dictation was created using voice recognition software. I have worked with technical experts from [facility] to optimize the interface.  I have made every reasonable attempt to correct obvious errors, but there may be errors of grammar and possibly content that I did not discover before finalizing the note.

## 2021-05-22 NOTE — PROGRESS NOTES
[facility] Progress Note     Hemodialysis treatment x 3 hours performed per Dr. PORFIRIO Mahajan/ NP S. Santos  Treatment started at 0941 and ended at 1142     Patient tolerated treatment well with multiple hypotensive episodes. Patient asymptomatic. UF goal adjusted per BP tolerance. Midodrine given by primary RN per MAR. NS given for BP support as needed. NP notified. Patient stable post treatment, no complaints. See Acute HD flow sheets for details.     Total Net UF Removed:  +800 mL (see Dialysis I & O Flowsheet)     Post treatment: Cannulation needles removed from LUE AVF access sites, hemostasis established on each insertion site; verified no bleeding. (+) bruit/thrill post treatment. Covered with clean gauze dressings and secured with tape.     Please monitor for AVF access bleeding.  Notify Dialysis and Nephrologist for follow-up.     1305 Report given to primary care nurse GORDON Nguyen RN.    Patient transported back to room via bed by transport. Patient belongings and  HD flowsheets sent wit patient. Safety precautions in place.

## 2021-05-23 PROBLEM — D53.9 MACROCYTIC ANEMIA: Status: ACTIVE | Noted: 2018-08-15

## 2021-05-23 NOTE — ASSESSMENT & PLAN NOTE
Hemoglobin 6.5 today  No signs of GI bleed  Vit B12/folate unremarkable  Secondary to anemia of chronic kidney disease  Transfuse 1 unit of PRBC  Nephrology following, recommending iron repletion  Continue erythropoietin as per nephrology

## 2021-05-23 NOTE — PROGRESS NOTES
Hospital Medicine Daily Progress Note    Date of Service  5/23/2021    Chief Complaint  75 y.o. male admitted 5/20/2021 with weakness and dizziness    Hospital Course  No notes on file    Interval Problem Update  5/21: PATSY done and arterial ultrasound pending.  Patient feels okay with no fevers or chills so far.  5/22: Find of some loose stools.  Culture started growing ESBL.  Consulted ID Dr. Love.  5/23: Total time: 36 minutes. Of this time, greater than 50% was spent counseling and coordinating care including discussion of hemoglobin, mental status, surgical desire.  Globin did decrease to 7.1 but stable at 7.5 on repeat.  I have asked and heparin were appropriately held overnight, now resuming.     Disposition  Pending further vascular discussion with patient about surgical plan.  Also pending palliative discussion about goals of care.    Review of Systems  Review of Systems   Constitutional: Negative for chills and fever.   Respiratory: Negative for cough and shortness of breath.    Cardiovascular: Negative for chest pain.   Gastrointestinal: Positive for diarrhea (loose Stools). Negative for abdominal pain, constipation, nausea and vomiting.   Genitourinary: Negative for dysuria.   Musculoskeletal: Positive for joint pain.   Neurological: Negative for headaches.   Psychiatric/Behavioral: The patient is nervous/anxious.         Physical Exam  Temp:  [36.5 °C (97.7 °F)-37.7 °C (99.8 °F)] 37.2 °C (98.9 °F)  Pulse:  [64-96] 75  Resp:  [15-20] 15  BP: ()/(24-77) 90/53  SpO2:  [89 %-100 %] 95 %    Physical Exam  Constitutional:       General: He is not in acute distress.     Appearance: He is well-developed. He is not diaphoretic.   HENT:      Head: Normocephalic and atraumatic.      Right Ear: External ear normal.      Left Ear: External ear normal.      Mouth/Throat:      Pharynx: No oropharyngeal exudate or posterior oropharyngeal erythema.   Eyes:      Extraocular Movements: Extraocular movements  intact.   Cardiovascular:      Rate and Rhythm: Normal rate.      Heart sounds: No gallop.    Pulmonary:      Effort: No respiratory distress.      Breath sounds: No wheezing.   Abdominal:      General: There is no distension.      Tenderness: There is no abdominal tenderness. There is no guarding.   Musculoskeletal:         General: Tenderness present.      Comments: Right BKA   Skin:     General: Skin is warm.   Neurological:      Mental Status: He is alert and oriented to person, place, and time. Mental status is at baseline.      Motor: No weakness.   Psychiatric:         Mood and Affect: Mood normal.         Behavior: Behavior normal.         Fluids    Intake/Output Summary (Last 24 hours) at 5/23/2021 1158  Last data filed at 5/22/2021 1300  Gross per 24 hour   Intake 1000 ml   Output 200 ml   Net 800 ml       Laboratory  Recent Labs     05/22/21  0329 05/23/21  0203 05/23/21  0617   WBC 8.2 7.9 7.0   RBC 2.30* 2.08* 2.25*   HEMOGLOBIN 7.8* 7.1* 7.5*   HEMATOCRIT 22.9* 22.2* 23.4*   MCV 99.6* 106.7* 104.0*   MCH 33.9* 34.1* 33.3*   MCHC 34.1 32.0* 32.1*   RDW 56.9* 62.4* 60.8*   PLATELETCT 253 178 190   MPV 9.7 10.0 10.1     Recent Labs     05/22/21 0329 05/23/21  0203   SODIUM 136 131*   POTASSIUM 4.4 4.3   CHLORIDE 95* 93*   CO2 27 28   GLUCOSE 121* 243*   BUN 36* 23*   CREATININE 6.13* 4.83*   CALCIUM 9.2 8.8                   Imaging  US-EXTREMITY ARTERY LOWER BILAT   Final Result      US-PATSY SINGLE LEVEL BILAT   Final Result      DX-FOOT-2- LEFT   Final Result      1.  Soft tissue swelling of LEFT 4th toe.   2.  No gross bony destruction however osteomyelitis is not excluded by plain film.   3.  Prior partial amputation of great toe.      DX-CHEST-PORTABLE (1 VIEW)   Final Result      Left lower lobe pleural thickening and calcification may be related to sequelae of prior infection or trauma.      Patchy left basilar opacity may represent atelectasis or pneumonitis.      Mild cardiomegaly.       Atherosclerotic plaque.              Assessment/Plan  * Cellulitis of left foot- (present on admission)  Assessment & Plan  One dose of vancomycin given in the ER  Switched to IV Unasyn  On 5/22, foot wound culture grew ESBL  ID Dr. Love consulted  Antibiotics per ID    Ischemia of toe- (present on admission)  Assessment & Plan  Left 4th toe ischemia  Limb preservation service consulted  He likely will need amputation  Hold Eliquis for possible surgery.  PATSY abnormal.  Ultrasound severe arterial occlusion  Surgery consulted by LPS    Hypotension- (present on admission)  Assessment & Plan  Chronic condition  Stop coreg  Continue home midodrine    End stage renal disease on dialysis ([facility])- (present on admission)  Assessment & Plan  Dialysis via left arm fistula T,Th,S  Dr. Hodges, nephrology was consulted from the ER    Type 2 diabetes mellitus with kidney complication, with long-term current use of insulin ([facility])- (present on admission)  Assessment & Plan  With hyperglycemia  Continue Lantus and sliding scale  Diabetic diet    Macrocytic anemia- (present on admission)  Assessment & Plan  Vit B12/folate pending    CAD (coronary artery disease)- (present on admission)  Assessment & Plan  With hx of stents followed by [facility]    DNR (do not resuscitate)- (present on admission)  Assessment & Plan  Discussed with Mr. Sepulveda and he confirmed this status.    Advance care planning- (present on admission)  Assessment & Plan  In a previous visit, he had indicated to palliative that he would consider hospice/comfort care  Currently, vascular recommend surgery and patient is agreeable  Consulted palliative care for further goals of care discussion after surgery    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)- (present on admission)  Assessment & Plan  Right BKA and left great toe amputation    Cardiomyopathy, ischemic- (present on admission)  Assessment & Plan  EF from heart cath 5/10  15-20%    Peripheral vascular disease (HCC)- (present on admission)  Assessment & Plan  Hx of multiple amputations     Paroxysmal A-fib (HCC)- (present on admission)  Assessment & Plan  Chronic condition  Hold Eliquis in case he needs surgery and utilize SQ heparin for DVT prophylaxis    Hyponatremia- (present on admission)  Assessment & Plan  Mild, continue to monitor       VTE prophylaxis: sc hep

## 2021-05-23 NOTE — PROGRESS NOTES
Was paged by RN that patient had blood pressure of 86/34 and heart rate 65, while feeling dizzy.  Had dialysis today.  Patient was given 500 mL bolus, midodrine 10 mg x 1 to which blood pressure improved to 100/67.    Patient denies hematemesis and blood in stool.    Of note, patient's hemoglobin continues to be trending down to 7.1 this morning.  Repeat CBC ordered for 8 AM.   Heparin subcutaneous discontinued, ordered SCD boots for DVT prophylaxis.  RN informed to hold Plavix for the morning.    I spent 31 min in reviewing the patient's condition, physical examination, laboratory and imaging data, prior documentation, in discussion with RN, patient, and in formulating an assessment/plan.     Critical Care time: 31 min. No time overlap, procedures not included in time.

## 2021-05-23 NOTE — CARE PLAN
Problem: Fall Risk  Goal: Patient will remain free from falls  Outcome: Progressing  Note: Safety precautions in place. Bed in lowest and locked position. Call light and personal belongings within reach. Bed alarm in place     Problem: Pain - Standard  Goal: Alleviation of pain or a reduction in pain to the patient’s comfort goal  Outcome: Progressing  Note: Patient complains of 4/10 pain in LLE. Rest and repositioned       Shift Goals  Clinical Goals: Pain control  Patient Goals: Increase BP, Pain control    Progress made toward(s) clinical / shift goals:  yes    Patient is not progressing towards the following goals:

## 2021-05-23 NOTE — CARE PLAN
The patient is Stable - Low risk of patient condition declining or worsening    Shift Goals  Clinical Goals: Pain control  Patient Goals: Increase BP, Pain control    Progress made toward(s) clinical / shift goals:  BP increased     Patient is not progressing towards the following goals:

## 2021-05-23 NOTE — PROGRESS NOTES
Brief ID note:    -Notified of updated plan by LPS. Patient currently is not a good candidate for surgical intervention given high risk  -From an infectious disease standpoint, no indication for antibiotics at this moment given evidence of gangrenous changes with no superimposed uncontrolled infection at this time  -Should erythema or swelling appear and progress proximal to the necrotic toe, may consider restarting renally dosed IV Unasyn but this will not be with curative intent given poor vascular supply and lack of surgical source control  -Agree with palliative care consult and consideration of hospice care. If infection does develop and no amputation performed, chronic suppressive renally dosed Augmentin may be considered if in line with goals of care.  This may perhaps the delay onset of sepsis    If any changes in plans.  Please call us back.  ID will sign off.  Discussed with Dr. Gardner

## 2021-05-23 NOTE — ASSESSMENT & PLAN NOTE
In a previous visit, he had indicated to palliative that he would consider hospice/comfort care  Currently, vascular recommend surgery and patient is agreeable  Consulted palliative care for further goals of care discussion after surgery

## 2021-05-23 NOTE — PROGRESS NOTES
" [facility]  PROGRESS NOTE    HPI:  Luis Sepulveda is a 75 y.o.  with a past medical history that includes type 2 diabetes,  ESRD on hemodialysis, PAD, Afib, L great toe amputation with tenotomy, HLD, s/p right BKA (12/2020 with Dr. Lynn) admitted 5/20/2021 for Cellulitis of left foot [L03.116].   LPS has been consulted for evaluation of necrotic left 4th toe. Patient reports he has occasional \"twinges\" of pain to left 4-5 toes since approximately early April 2021. He states he was unaware of any wounds or discoloration of his left toes. He states he was discharged from rehab in early April 2020 and has a home health Rn that comes to his home weekly every Friday. He states the home health RN had provided him with a mirror to check his feet but he is unable to adequately check his feet due to vision deterioration. Pain is worse with touch, relieved with rest and non-weight bearing. Denies fever, chills, nausea, vomiting, CP, HA, SOB.         INTERVAL HISTORY:  5/23/2021: Patient denies fevers, chills, nausea, vomiting.  Pain well controlled.       PERTINENT LPS RESULTS:   COVID-19: Not detected this admission     X-ray:      Dx-foot-2-left 5/20/2021     FINDINGS:  Prior partial amputation of great toe at the base of proximal phalanx.  Soft tissue swelling of 4th toe.  No soft tissue gas.  Diffuse vascular calcifications.  No focal bony destruction.  Calcaneal enthesophytes.  Calcification dorsal to calcaneus, possibly within Achilles tendon, chronic.     IMPRESSION:     1.  Soft tissue swelling of LEFT 4th toe.  2.  No gross bony destruction however osteomyelitis is not excluded by plain film.  3.  Prior partial amputation of great toe.        MRI: none this admission     Arterial studies:   US-PATSY single level bilateral 5/21/2021                     RIGHT      Waveform            Systolic BPs (mmHg)                              120 "           Brachial                                            Common Femoral                                            Posterior Tibial                                            Dorsalis Pedis                                            Peroneal                                            PATSY                                            TBI                           LEFT   Waveform        Systolic BPs (mmHg)                                            Brachial   Triphasic                                Common Femoral   Monophasic                 127           Posterior Tibial   Monophasic                 45            Dorsalis Pedis                                            Peroneal                              1.06          PATSY                                            TBI         Findings   Right-   Ankle pressure not obtained due to below the knee amputation.    Bandage over the common femoral artery.    Doppler waveform of the popliteal artery is of high amplitude and    triphasic.       Left-   Ankle-brachial index of the dorsalis pedis artery is severely reduced.    Ankle-brachial index of the posterior tibial artery is normal.    Doppler waveform of the common femoral artery is of high amplitude and    triphasic.    Doppler waveforms at the ankle are monophasic with moderate amplitude.       PPG's-   No flow       Bilateral arterial duplex scan was performed in accordance with lower    extremity arterial evaluation protocol - see separate report.        US-extremity artery lower bilateral 5/21/2021      RIGHT   Waveform        Peak Systolic Velocity (cm/s)                   Prox    Prox-Mid  Mid    Mid-Dist  Distal   Triphasic                         52                       CFA         Biphasic        55                                         PFA         Biphasic        44                65      24       56      SFA         Biphasic                          35                       POP         Monophasic  "     62                                         AT         Monophasic      51                                         PT         Not                                                        LISA   attained                    LEFT   Waveform        Peak Systolic Velocity (cm/s)                   Prox    Prox-Mid  Mid    Mid-Dist  Distal   Triphasic                         132                      CFA         Biphasic        43                                         PFA         Biphasic        50                62               42      SFA         Triphasic                         56                       POP         Monophasic      48                                 70      AT         Monophasic      37                                 30      PT         Absent          0                                  54      LISA               FINDINGS   Right-   Calcific atherosclerotic plaque seen throughout the lower extremity.    Stenosis of the distal superficial femoral artery. Velocities are    consistent with 50-75% stenosis.    Proximal anterior and posterior tibial artery waveforms are monophasic.    Proximal peroneal artery was not visualized.       Left-   Calcific atherosclerotic plaque seen throughout the lower extremity.   Monophasic waveforms seen in the anterior and posterior tibial arteries.   Absent flow seen in the proximal peroneal artery.         EXAM:      BP (!) 88/31   Pulse 65   Temp 37.1 °C (98.8 °F) (Temporal)   Resp 17   Ht 1.676 m (5' 6\")   Wt 75.8 kg (167 lb)   SpO2 90%   BMI 26.95 kg/m²     Pedal Pulses: Unable to palpate left DP/PT pulses, soft monophasic DP/PT pulses via Doppler  Sensation: Insensate to left foot, sensation intact to right BKA      Wound :    Left dorsal foot  Dark necrotic/dry gangrenous left fourth toe.  Left 2, 3, 5 toes are cool to touch with a very mild purple discoloration.  There is very mild purple discoloration to plantar side of left foot proximal to left " gangrenous fourth toe.   Previous erythema to left foot improved.  2+ edema to left foot, likely dependent edema.    No drainage or odor.      Left dorsal foot    Left plantar foot          DIABETES MANAGEMENT:    Blood glucose:   Results from last 7 days   Lab Units 05/23/21  1234 05/23/21  0916 05/23/21  0203 05/23/21  0126 05/22/21  2342 05/22/21  2319 05/22/21  1939 05/22/21  1735   ACCU CHECK GLUCOSE 788 mg/dL 131* 113* 228* 83 115* 30* 81 90     A1c:   Lab Results   Component Value Date/Time    HBA1C 5.7 (H) 05/11/2021 06:30 AM            INFECTION MANAGEMENT:    Results from last 7 days   Lab Units 05/23/21  0617 05/23/21  0203 05/22/21  0329 05/21/21  1309 05/20/21  0647 05/18/21  0349   WBC 1501 K/uL 7.0 7.9 8.2 7.2 13.0* 9.5   PLATELET COUNT 1518 K/uL 190 178 253 196 279 267     Wound culture results:   Results     Procedure Component Value Units Date/Time    CULTURE WOUND W/ GRAM STAIN [[id number]]  (Abnormal)  (Susceptibility) Collected: 05/20/21 0759    Order Status: Completed Specimen: Wound from Left Foot Updated: 05/23/21 0719     Significant Indicator POS     Source WND     Site LEFT FOOT     Culture Result Light growth usual skin xander.     Gram Stain Result Rare WBCs.  Many Gram positive cocci.  Few Gram negative rods.       Culture Result Klebsiella pneumoniae ESBL  Moderate growth  Extended Spectrum Beta-lactamase (ESBL) isolated.  ESBL's may be clinically resistant to therapy with  Penicillins,Cephalosporins or Aztreonam despite  apparent in vitro susceptibility to some of these agents.  The patient requires contact isolation.  Please contact pharmacy or an Infectious Disease Specialist  if you have any questions about appropriate therapy.        Proteus mirabilis  Light growth        Staphylococcus aureus  Moderate growth  Methicillin sensitive via screening method.      Narrative:      CALL  [facility]  131 tel. [phone],  CALLED  131 tel. [phone] 05/22/2021, 09:32, RB PERF. RESULTS CALLED TO:  RN  [id number]    Susceptibility     Klebsiella pneumoniae esbl (1)     Antibiotic Interpretation Microscan Method Status    Ceftriaxone Extended Spectrum Beta Lactamase >32 mcg/mL BINU Preliminary    Cefazolin Resistant >16 mcg/mL BINU Preliminary    Ciprofloxacin Resistant >2 mcg/mL BINU Preliminary    Cefepime Resistant >16 mcg/mL BINU Preliminary    Cefuroxime Resistant >16 mcg/mL BINU Preliminary    Ampicillin/sulbactam Intermediate 16/8 mcg/mL BINU Preliminary    Ertapenem Sensitive <=0.5 mcg/mL BINU Preliminary    Gentamicin Sensitive <=2 mcg/mL BINU Preliminary    Moxifloxacin Intermediate 4 mcg/mL BINU Preliminary    Pip/Tazobactam Sensitive <=8 mcg/mL BINU Preliminary    Trimeth/Sulfa Resistant >2/38 mcg/mL BIUN Preliminary    Tigecycline Sensitive <=2 mcg/mL BINU Preliminary    Tobramycin Sensitive <=2 mcg/mL BINU Preliminary          Proteus mirabilis (2)     Antibiotic Interpretation Microscan Method Status    Ceftriaxone Sensitive <=1 mcg/mL BINU Preliminary    Cefazolin Sensitive <=2 mcg/mL BINU Preliminary    Ciprofloxacin Sensitive <=0.25 mcg/mL BINU Preliminary    Cefepime Sensitive <=2 mcg/mL BINU Preliminary    Cefuroxime Sensitive <=4 mcg/mL BINU Preliminary    Ampicillin/sulbactam Sensitive <=4/2 mcg/mL BINU Preliminary    Ertapenem Sensitive <=0.5 mcg/mL BINU Preliminary    Ampicillin Sensitive <=8 mcg/mL BINU Preliminary    Gentamicin Sensitive <=2 mcg/mL BINU Preliminary    Moxifloxacin Sensitive <=2 mcg/mL BINU Preliminary    Pip/Tazobactam Sensitive <=8 mcg/mL BINU Preliminary    Trimeth/Sulfa Sensitive <=0.5/9.5 mcg/mL BINU Preliminary    Tobramycin Sensitive <=2 mcg/mL BINU Preliminary            Condensed View                   GRAM STAIN [[id number]] Collected: 05/20/21 0759    Order Status: Completed Specimen: Wound Updated: 05/23/21 0719     Significant Indicator .     Source WND     Site LEFT FOOT     Gram Stain Result Rare WBCs.  Many Gram positive cocci.  Few Gram negative rods.      Narrative:      CALL  [facility]   "131 tel. [phone],  CALLED  131 tel. [phone] 05/22/2021, 09:32, RB PERF. RESULTS CALLED TO: RN  [id number]    BLOOD CULTURE [[id number]] Collected: 05/20/21 0740    Order Status: Completed Specimen: Blood from Peripheral Updated: 05/21/21 0821     Significant Indicator NEG     Source BLD     Site PERIPHERAL     Culture Result No Growth  Note: Blood cultures are incubated for 5 days and  are monitored continuously.Positive blood cultures  are called to the RN and reported as soon as  they are identified.      Narrative:      Per Hospital Policy: Only change Specimen Src: to \"Line\" if  specified by physician order.  No site indicated    BLOOD CULTURE [[id number]] Collected: 05/20/21 0748    Order Status: Completed Specimen: Blood from Peripheral Updated: 05/21/21 0821     Significant Indicator NEG     Source BLD     Site PERIPHERAL     Culture Result No Growth  Note: Blood cultures are incubated for 5 days and  are monitored continuously.Positive blood cultures  are called to the RN and reported as soon as  they are identified.      Narrative:      Per Hospital Policy: Only change Specimen Src: to \"Line\" if  specified by physician order.  No site indicated    SARS-CoV-2 PCR (24 hour In-House): Collect NP swab in VTM [[id number]] Collected: 05/20/21 0940    Order Status: Completed Specimen: Respirate Updated: 05/20/21 1259     SARS-CoV-2 Source NP Swab     SARS-CoV-2 by PCR NotDetected     Comment: PATIENTS: Important information regarding your results and instructions can  be found at [email or web address]   \"After your  Covid-19 Test\"    [facility] providers: PLEASE REFER TO DE-ESCALATION AND RETESTING PROTOCOL  on [email or web address]    **The TaqPath COVID-19 SARS-CoV-2 RT-PCR test has been made available for  use under the Emergency Use Authorization (EUA) only.         Narrative:      Have you been in close contact with a person who is suspected  or known to be positive for COVID-19 within the " "last 30 days  (e.g. last seen that person < 30 days ago)->No                     ASSESSMENT/PLAN:   This is a 75-year-old male with a history of type 2 diabetes, PAD, ESRD on HD, right BKA, recent NSTEMI, EF 15-20%, left anterior tibial artery angioplasty, percutaneous thrombectomy of left peroneal artery, left peroneal artery angioplasty with Dr. Encinas 04/2021.  Patient's left fourth toe remains necrotic in his left 2, 3, 5 toes are with a mild purple discoloration and are cool to touch.  Dr. Bee from vascular surgery evaluate patient on 5/22/2021.  Patient is found to be of high risk due to his multiple comorbidities and low ejection fraction.  Additionally during patient's recent hospitalization, he had expressed interest in hospice with palliative care consult.  Palliative consult was placed by hospitalist on 5/22/2021.  Per Dr. Gardner, vascular to check on patient and again today, 5/23/2021.  Patient was adamant with LPS APRN that he would like to proceed with amputation and attempt to return to his \"normal life\".  Patient states he has goals and he would like to return to golfing with his grandkids.  Discussed with patient that is unlikely that he would be able to return to his fully functional status that he would like to obtain.  Discussed that he is unlikely to heal a fourth toe amputation let alone left TMA and that if amputation with was done, he would likely need a left BKA.  Discussed he would have to partake in physical therapy as he had previously done with his right BKA.  He states that he has not been able to appropriately put on his  sock to his right BKA due to pain and numbness in his fingers.  Given this, I notify patient that if amputation were performed, there is a very real possibility that he may be wheelchair-bound for mobility. I also discussed nonsurgical options of wound care, offloading, antibiotics as needed for any infections.  Although this plan is not curative and would " "only delay eventual sepsis and, ultimately, death, goals of care regarding optimizing patient's functional status and life are possibilities as well.  Patient remains hopeful that he would be able to heal a left BKA and be successful in physical therapy in order to return to his \"normal life\".       Wound care:   -wound care orders placed for nursing   -Paint anodyne to left fourth toe 2 times a day.    Labs/Imaging:  -COVID-19: not detected this admission on this admission  -no further labs or imaging at this time    Vascular status:   - Unable to palpate left DP/PT pulses, soft monophasic DP/PT pulses via Doppler    Surgery:   -TBD    Antibiotics:   -ID involved.  Holding antibiotics.  Unasyn IV to be restarted if erythema worsens.    Weight Bearing Status:   -Heel weight bearing LLE    Offloading:   -Prevalon boot to left lower extremity while in bed  -Orthotic company: [employer]    PT/OT :   None at this time    Diabetes Education:   - Implications of loss of protective sensation (LOPS) discussed with patient- including increased risk for amputation.  Advised to check feet at least daily, moisturize feet, and to always wear protective foot wear.   -avoid trimming own nails. See podiatrist or certified foot and nail RN  -keep blood sugars <150 for improved wound healing    DISCHARGE PLAN:    Disposition: Patient made inpatient for ongoing medical care.  Patient is pending palliative consult.    Follow-up: TBD      Discussed with: pt, Ling RN, Dr. Gardner, Dr. Love    Please note that this dictation was created using voice recognition software. I have  worked with technical experts from [employer] to optimize the interface.  I have made every reasonable attempt to correct obvious errors, but there may be errors of grammar and possibly content that I did not discover before finalizing the note.    Jessica Blackmon, A.P.R.N.    If any questions or concerns, please contact LPS through voalte.    "

## 2021-05-23 NOTE — PROGRESS NOTES
[facility] Nephrology Daily Progress Note    Date of Service  5/23/2021    Author: Madison ECKERT, CNN-NP  Collaborating Physician: Dr. Anthony       Chief Complaint  76 y/o man with ESRD HD T,TH,Sat at [facility] presented with hypotension and inability to HD. On routine labs noted to have hyperkalemia.  Pt has increased erythema of 4th toe on left with some spreading erythema. Pt reports that this is worsening over the last few weeks, but though it secondary to trauma.     No F/C/N/V/CP/SOB.  No melena, hematochezia, hematemesis.  No HA, visual changes, or abdominal pain.    Daily Nephrology Summary:   5/20- consult done  5/21 - sitting up in bed, feeling good, appetite good, mild non-productive cough, tolerated HD yesterday UF: 158ml  5/22 - seen on HD, some hypotension this am, limited UF with HD, vascular to consult   5/23 - sitting up in bed, c/o severe pain in foot and L hand, vascular saw pt and plan poss intervention     Review of Systems  Review of Systems   Constitutional: Negative.    HENT: Negative.    Eyes: Negative.    Respiratory: Positive for cough.    Cardiovascular: Negative.    Gastrointestinal: Negative.    Genitourinary: Negative.    Musculoskeletal: Positive for joint pain.   Skin: Negative.    Neurological: Negative.    Psychiatric/Behavioral: Negative.         Physical Exam  Temp:  [36.5 °C (97.7 °F)-37.7 °C (99.8 °F)] 37.2 °C (98.9 °F)  Pulse:  [64-96] 75  Resp:  [15-20] 15  BP: ()/(24-77) 90/53  SpO2:  [89 %-100 %] 95 %    Physical Exam  Constitutional:       Appearance: Normal appearance. He is normal weight.   HENT:      Head: Normocephalic and atraumatic.      Nose: Nose normal.      Mouth/Throat:      Mouth: Mucous membranes are dry.      Pharynx: Oropharynx is clear.   Eyes:      Extraocular Movements: Extraocular movements intact.      Conjunctiva/sclera: Conjunctivae normal.      Pupils: Pupils are equal, round, and reactive to light.   Cardiovascular:      Rate and  Rhythm: Normal rate and regular rhythm.      Pulses: Normal pulses.      Heart sounds: Normal heart sounds.   Pulmonary:      Effort: Pulmonary effort is normal.      Breath sounds: Normal breath sounds.   Abdominal:      General: Abdomen is flat. Bowel sounds are normal.      Palpations: Abdomen is soft.   Musculoskeletal:         General: Normal range of motion.      Cervical back: Normal range of motion and neck supple.      Comments: L 3rd toe black  RUE AVF    Skin:     General: Skin is warm and dry.      Capillary Refill: Capillary refill takes more than 3 seconds.   Neurological:      General: No focal deficit present.      Mental Status: He is alert and oriented to person, place, and time. Mental status is at baseline.   Psychiatric:         Mood and Affect: Mood normal.         Behavior: Behavior normal.         Thought Content: Thought content normal.         Judgment: Judgment normal.         Fluids    Intake/Output Summary (Last 24 hours) at 5/23/2021 1125  Last data filed at 5/22/2021 1300  Gross per 24 hour   Intake 1000 ml   Output 200 ml   Net 800 ml       Laboratory  Recent Labs     05/22/21  0329 05/23/21  0203 05/23/21  0617   WBC 8.2 7.9 7.0   RBC 2.30* 2.08* 2.25*   HEMOGLOBIN 7.8* 7.1* 7.5*   HEMATOCRIT 22.9* 22.2* 23.4*   MCV 99.6* 106.7* 104.0*   MCH 33.9* 34.1* 33.3*   MCHC 34.1 32.0* 32.1*   RDW 56.9* 62.4* 60.8*   PLATELETCT 253 178 190   MPV 9.7 10.0 10.1     Recent Labs     05/22/21  0329 05/23/21  0203   SODIUM 136 131*   POTASSIUM 4.4 4.3   CHLORIDE 95* 93*   CO2 27 28   GLUCOSE 121* 243*   BUN 36* 23*   CREATININE 6.13* 4.83*   CALCIUM 9.2 8.8         No results for input(s): NTPROBNP in the last 72 hours.        Imaging       Assessment/Plan  # ESRD   maint HD q  T/T/S via   AVF. Left arm   # Hypotension: cardiogenic vs toe/cellultis   midodrine  #PVD with left 4th great toe discoloration   Per vascular    abx   # Cardiomyopathy: acute worsening. EF now 15-20%  # 80% ostial LAD  in-stent restenosis:   # Steal syndrome: s/p DRI. Worsening apparent ischmia in setting of heart failure  # Anemia in CKD   Iron replete    ARMANDO   # Atrial fibrillation   plavix    BB  # Diabetes mellitus  # Hyperthyroidism   Tapazole  #Hyponatremia, resolved    UF with HD   # Hyperkalemia, corrected    Manage with HD   #Hyperphosphatemia   sevelamer TID with meals   # BMD-CKD   Calcitriol   Sevelamer      Plan  HD q TThS and prn  UF with HD as tolerated   Vasc surgery eval of toe  DC IVFs, okay to bolus PRN symptomatic hypotension   Increase sevelamer   Renal dose ABX  Epo

## 2021-05-24 NOTE — CARE PLAN
The patient is Stable - Low risk of patient condition declining or worsening    Shift Goals  Clinical Goals: Pain control  Patient Goals: Increase BP, Pain control    Progress made toward(s) clinical / shift goals: Pt reports pain controlled by tramadol.    Patient is not progressing towards the following goals: Pt BP still in low 90s, pt asymptomatic currently.       Problem: Knowledge Deficit - Standard  Goal: Patient and family/care givers will demonstrate understanding of plan of care, disease process/condition, diagnostic tests and medications  Outcome: Progressing     Problem: Fall Risk  Goal: Patient will remain free from falls  Outcome: Progressing     Problem: Pain - Standard  Goal: Alleviation of pain or a reduction in pain to the patient’s comfort goal  Outcome: Progressing     Problem: Diabetes Management  Goal: Patient will achieve and maintain glucose in satisfactory range  Outcome: Progressing

## 2021-05-24 NOTE — PROGRESS NOTES
[facility] Nephrology Daily Progress Note    Date of Service  5/24/2021    Author: Marvin Mahajan M.D.    Chief Complaint  76 y/o man with ESRD HD T,TH,Sat at [place] [facility] presented with hypotension and inability to HD. On routine labs noted to have hyperkalemia.  Pt has increased erythema of 4th toe on left with some spreading erythema. Pt reports that this is worsening over the last few weeks, but though it secondary to trauma.     No F/C/N/V/CP/SOB.  No melena, hematochezia, hematemesis.  No HA, visual changes, or abdominal pain.    Daily Nephrology Summary:   5/20 - Consult done  5/21 - sitting up in bed, feeling good, appetite good, mild non-productive cough, tolerated HD yesterday UF: 158ml  5/22 - seen on HD, some hypotension this am, limited UF with HD, vascular to consult   5/23 - sitting up in bed, c/o severe pain in foot and L hand, vascular saw pt and plan poss intervention   5/24 - NAEO, feels good, having issues with dropping objects recently over the past 3-4 days    Review of Systems  Review of Systems   Constitutional: Negative.    HENT: Negative.    Eyes: Negative.    Respiratory: Positive for cough.    Cardiovascular: Negative.    Gastrointestinal: Negative.    Genitourinary: Negative.    Musculoskeletal: Positive for joint pain.   Skin: Negative.    Neurological: Negative.    Psychiatric/Behavioral: Negative.       Physical Exam  Temp:  [36.8 °C (98.2 °F)-37.3 °C (99.2 °F)] 36.8 °C (98.2 °F)  Pulse:  [62-90] 62  Resp:  [17-20] 17  BP: ()/(30-38) 96/38  SpO2:  [90 %-99 %] 99 %    Physical Exam  Vitals and nursing note reviewed.   Constitutional:       Appearance: Normal appearance.   HENT:      Head: Normocephalic and atraumatic.   Eyes:      General: No scleral icterus.        Right eye: No discharge.         Left eye: No discharge.   Cardiovascular:      Rate and Rhythm: Normal rate and regular rhythm.   Pulmonary:      Effort: Pulmonary effort is normal.      Breath sounds: Normal  breath sounds.   Abdominal:      General: Bowel sounds are normal.      Palpations: Abdomen is soft.   Musculoskeletal:         General: Deformity (Necrotic 3rd toe on left foot) present. No tenderness.      Cervical back: Neck supple. No muscular tenderness.      Right lower leg: No edema.      Left lower leg: No edema.   Neurological:      Mental Status: He is alert. Mental status is at baseline.     Fluids    Intake/Output Summary (Last 24 hours) at 5/24/2021 1100  Last data filed at 5/24/2021 1021  Gross per 24 hour   Intake 120 ml   Output --   Net 120 ml     Laboratory  Recent Labs     05/23/21  0203 05/23/21  0617 05/24/21  0514   WBC 7.9 7.0 9.8   RBC 2.08* 2.25* 2.05*   HEMOGLOBIN 7.1* 7.5* 7.0*   HEMATOCRIT 22.2* 23.4* 22.1*   .7* 104.0* 107.8*   MCH 34.1* 33.3* 34.1*   MCHC 32.0* 32.1* 31.7*   RDW 62.4* 60.8* 62.7*   PLATELETCT 178 190 194   MPV 10.0 10.1 9.7     Recent Labs     05/22/21  0329 05/23/21  0203 05/24/21  0514   SODIUM 136 131* 132*   POTASSIUM 4.4 4.3 5.0   CHLORIDE 95* 93* 95*   CO2 27 28 26   GLUCOSE 121* 243* 115*   BUN 36* 23* 33*   CREATININE 6.13* 4.83* 6.45*   CALCIUM 9.2 8.8 9.2         No results for input(s): NTPROBNP in the last 72 hours.        Imaging  - Reviewed    Assessment/Plan  # ESRD   maint HD q  T/T/S via   AVF. Left arm   # Hypotension: cardiogenic vs toe/cellultis   midodrine  # PVD with left 4th great toe discoloration   Per vascular    abx   # Cardiomyopathy: acute worsening. EF now 15-20%  # 80% ostial LAD in-stent restenosis:   # Steal syndrome: s/p DRI. Worsening apparent ischmia in setting of heart failure  # Anemia in CKD   Iron replete    ARMANDO   # Atrial fibrillation   plavix    BB  # Diabetes mellitus  # Hyperthyroidism   Tapazole  #Hyponatremia, resolved    UF with HD   # Hyperkalemia, corrected    Manage with HD   #Hyperphosphatemia   sevelamer TID with meals   # BMD-CKD   Calcitriol   Sevelamer     Plan  - TTS iHD  - UF with HD as tolerated   - FU  Vasc eval  - DC IVFs, okay to bolus PRN symptomatic hypotension   - Continue binders with meals  - Dose all meds per ESRD  - Medications maybe cause of his dropping of objects recently, or could be build up of uremic toxins especially on 72 hr break, discussed will continue to monitor as the week progresses

## 2021-05-24 NOTE — PROGRESS NOTES
Isolation Precautions:    Patient is on contact isolation for ESBL.    Patient educated on reason for isolation, how the infection may be transmitted, and how to help prevent transmission to others.     Patient educated that CONTACT precautions involves staff and visitors wearing PPE, follow  Standard Precautions and perform meticulous hand hygiene in order to prevent transmission of infection. (Contact Precautions: gown and gloves; Special Contact Precautions: gown and gloves, with the added requirement of soap and water for hand hygiene; Droplet Precautions: surgical mask worn by staff and visitors in the room, and worn by the patient when out of the room; Airborne Precautions: involves staff wearing PPE to include an N95 Respirator or Controlled Air Purifying Respirator (CAPR).  Visitors should be limited and may wear an N95 mask).         Patient transport and mobilization on unit. Patient educated that they may leave their room, but prior to exiting, the patient needs to have on a fresh patient gown, ensure the potentially infectious area is covered, perform appropriate hand hygiene immediately prior to exiting the room. (*For Airborne Precautions: Patient educated that time out of the room should be minimized and limited to transport to diagnostic procedures or other activities. Patient is to wear surgical mask when required to leave the patient room).  Isolation Precautions:

## 2021-05-24 NOTE — CONSULTS
"Reason for PC Consult: Advance Care Planning    Consulted by: Dr. Damon Gardner    Assessment:  General: Pt is a 75 year old male admitted for weakness and dizziness.  Patient with cellulitis Lt foot, Lt 4th toe ischemia,  Infectious disease, surgical and limb preservation consultations, hypotension, ESRD HD T, Th, S, DM, anemia, CAD, ischemic cardiomyopathy, PVD and a. Fib.  Additional past medical history of Rt BKA,  Lt great toe amputation, MI and hyperthyroidism.  Pt had recent admission 5/10/-5/18/2021 for SOB/Cough. Palliative Care consultation during admission.    Social: Pt lives with this ex-wife and son in [place].      Consults:   Surgery  Infectious Disease  Nephrology  Limb Preservation    Dyspnea: Yes-  3L NC  Last BM: 05/22/21-    Pain: No-  Pt reports feeling \"crappy\" and uncomfortable in bed repositioned for comfort  Depression: Mood appropriate for situation-  \"I want more quality time\"  Dementia: No;       Spiritual:  Is Yazdanism or spirituality important for coping with this illness? No-    Has a  or spiritual provider visit been requested? No declined    Palliative Performance Scale: 60%    Advance Directive: None-    DPOA:  -  NOK would be adult children (Miguel & Alivia)  POLST: Yes-  Executed 5/12/2021 during a prior admission fro DNR, Selective Treatment and TF trial no longer than 10 days.     Code Status: DNR- Confirmed with pt     Outcome:  Introduced self and role of Palliative Care.  Adalid recalls conversations with Marcia from Palliative Care during his last admission. Explored patient's values, beliefs, and preferences in order to identify goals of care and a plan of care.  Pt confirms DNR status. Pt is willing to have additional surgery and amputation to give him more time.  Discussed the alternative of transitioning to comfort focused care with the support of hospice .  Pt states he is not ready.  Pt doesn't recall if [facility] established with him after his last " "discharge.  Pt did state that he had HH.  Pt states \"the bottom line is that if I have to get chopped up a little more so I get more time that is what they will have to do\".  Active listening, reflection,validation, normalization, and empathic support utilized throughout this encounter.  All questions answered.     Recommendations:  Pt wanting to continue current treatment plan including dialysis and surgery if needed.     Updated: Dr. Gardner, Malinda PALMA, & Frannie RN    Plan: Pt wanting to proceed with surgery if needed.  Pt wants to return home.     Thank you for allowing Palliative Care to participate in this patient's care. Please feel free to call [phone] with any questions or concerns.  "

## 2021-05-24 NOTE — HOSPITAL COURSE
75 y.o. male who presented 5/20/2021 with left foot pain  Mr. Sepulveda has a past medical history of end-stage renal disease on dialysis, peripheral vascular disease status post right below the knee amputation, insulin-dependent diabetes mellitus, there was most recently admitted here from 5/10/2021 through 5/18/2021 with shortness of breath.  During that hospitalization he underwent heart catheterization revealing ejection fraction of 15 to 20% with 80% ostial LAD in-stent restenosis and distal RCA disease.  He presents back today with 4 to 5 days of left foot pain especially when walking with his walker.  He has been compliant with dialysis and went on Tuesday.  He was admitted for cellulitis of the left foot with what appears to be an ischemic left fourth toe.  He has been on Eliquis which was be held in case he requires amputation.  Upon admission, he was seen by LPS.  ID saw him as well and DC'd his antibiotics as he was not septic.  Palliative care saw him and patient does want surgery for now.  Vascular is evaluating him to see if he is a candidate for surgery.

## 2021-05-24 NOTE — PROGRESS NOTES
Hospital Medicine Daily Progress Note    Date of Service  5/24/2021    Chief Complaint  75 y.o. male admitted 5/20/2021 with weakness and dizziness    Hospital Course  75 y.o. male who presented 5/20/2021 with left foot pain  Mr. Sepulveda has a past medical history of end-stage renal disease on dialysis, peripheral vascular disease status post right below the knee amputation, insulin-dependent diabetes mellitus, there was most recently admitted here from 5/10/2021 through 5/18/2021 with shortness of breath.  During that hospitalization he underwent heart catheterization revealing ejection fraction of 15 to 20% with 80% ostial LAD in-stent restenosis and distal RCA disease.  He presents back today with 4 to 5 days of left foot pain especially when walking with his walker.  He has been compliant with dialysis and went on Tuesday.  He was admitted for cellulitis of the left foot with what appears to be an ischemic left fourth toe.  He has been on Eliquis which was be held in case he requires amputation.  Upon admission, he was seen by LPS.  ID saw him as well and DC'd his antibiotics as he was not septic.  Palliative care saw him and patient does want surgery for now.  Vascular is evaluating him to see if he is a candidate for surgery.        Interval Problem Update  5/21: PATSY done and arterial ultrasound pending.  Patient feels okay with no fevers or chills so far.  5/22: Find of some loose stools.  Culture started growing ESBL.  Consulted ID Dr. Love.  5/23: Hgb did decrease to 7.1 but stable at 7.5 on repeat.  I have asked and heparin were appropriately held overnight, now resuming.   5/24: Seen by palliative.  Patient does want surgery for now.  Updated vascular.    Disposition  Pending further vascular discussion with patient about surgical plan.     Review of Systems  Review of Systems   Constitutional: Negative for fever.   Respiratory: Negative for cough, shortness of breath and wheezing.     Cardiovascular: Negative for chest pain.   Gastrointestinal: Positive for diarrhea (loose Stools). Negative for abdominal pain, constipation, nausea and vomiting.   Genitourinary: Negative for dysuria.   Musculoskeletal: Positive for joint pain.   Neurological: Negative for headaches.   Psychiatric/Behavioral: The patient is nervous/anxious.         Physical Exam  Temp:  [36.8 °C (98.2 °F)-37.3 °C (99.2 °F)] 37.2 °C (99 °F)  Pulse:  [62-90] 85  Resp:  [17-20] 17  BP: ()/(30-46) 99/38  SpO2:  [94 %-99 %] 97 %    Physical Exam  Constitutional:       General: He is not in acute distress.     Appearance: He is well-developed. He is not diaphoretic.   HENT:      Head: Normocephalic and atraumatic.      Right Ear: External ear normal.      Left Ear: External ear normal.      Mouth/Throat:      Pharynx: No oropharyngeal exudate or posterior oropharyngeal erythema.   Eyes:      Extraocular Movements: Extraocular movements intact.   Cardiovascular:      Rate and Rhythm: Normal rate.      Heart sounds: No gallop.    Pulmonary:      Effort: No respiratory distress.      Breath sounds: No wheezing.   Abdominal:      General: There is no distension.      Tenderness: There is no abdominal tenderness.   Musculoskeletal:         General: Tenderness present.      Comments: Right BKA   Skin:     General: Skin is warm.   Neurological:      Mental Status: He is alert and oriented to person, place, and time. Mental status is at baseline.      Motor: No weakness.   Psychiatric:         Mood and Affect: Mood normal.         Behavior: Behavior normal.         Fluids    Intake/Output Summary (Last 24 hours) at 5/24/2021 1639  Last data filed at 5/24/2021 1400  Gross per 24 hour   Intake 220 ml   Output --   Net 220 ml       Laboratory  Recent Labs     05/23/21  0203 05/23/21  0617 05/24/21  0514   WBC 7.9 7.0 9.8   RBC 2.08* 2.25* 2.05*   HEMOGLOBIN 7.1* 7.5* 7.0*   HEMATOCRIT 22.2* 23.4* 22.1*   .7* 104.0* 107.8*   MCH 34.1*  33.3* 34.1*   MCHC 32.0* 32.1* 31.7*   RDW 62.4* 60.8* 62.7*   PLATELETCT 178 190 194   MPV 10.0 10.1 9.7     Recent Labs     05/22/21  0329 05/23/21  0203 05/24/21  0514   SODIUM 136 131* 132*   POTASSIUM 4.4 4.3 5.0   CHLORIDE 95* 93* 95*   CO2 27 28 26   GLUCOSE 121* 243* 115*   BUN 36* 23* 33*   CREATININE 6.13* 4.83* 6.45*   CALCIUM 9.2 8.8 9.2                   Imaging  US-EXTREMITY ARTERY LOWER BILAT   Final Result      US-PATSY SINGLE LEVEL BILAT   Final Result      DX-FOOT-2- LEFT   Final Result      1.  Soft tissue swelling of LEFT 4th toe.   2.  No gross bony destruction however osteomyelitis is not excluded by plain film.   3.  Prior partial amputation of great toe.      DX-CHEST-PORTABLE (1 VIEW)   Final Result      Left lower lobe pleural thickening and calcification may be related to sequelae of prior infection or trauma.      Patchy left basilar opacity may represent atelectasis or pneumonitis.      Mild cardiomegaly.      Atherosclerotic plaque.              Assessment/Plan  * Cellulitis of left foot- (present on admission)  Assessment & Plan  One dose of vancomycin given in the ER  Switched to IV Unasyn  On 5/22, foot wound culture grew ESBL  ID Dr. Love consulted  Antibiotics DC'd ID -monitor and may resume if patient does develop cellulitis    Ischemia of toe- (present on admission)  Assessment & Plan  Left 4th toe ischemia  Limb preservation service consulted  He likely will need amputation  Hold Eliquis for possible surgery.  PATSY abnormal.  Ultrasound severe arterial occlusion  Vascular surgery consulted by LPS    Hypotension- (present on admission)  Assessment & Plan  Chronic condition  Stop coreg  Continue home midodrine    End stage renal disease on dialysis (HCC)- (present on admission)  Assessment & Plan  Dialysis via left arm fistula T,Th,S  Dr. Hodges, nephrology was consulted from the ER    Type 2 diabetes mellitus with kidney complication, with long-term current use of insulin  (HCC)- (present on admission)  Assessment & Plan  With hyperglycemia  Continue Lantus and sliding scale  Diabetic diet    Macrocytic anemia- (present on admission)  Assessment & Plan  Vit B12/folate unremarkable    CAD (coronary artery disease)- (present on admission)  Assessment & Plan  With hx of stents followed by [facility]    DNR (do not resuscitate)- (present on admission)  Assessment & Plan  Discussed with Mr. Sepulveda and he confirmed this status.    Advance care planning- (present on admission)  Assessment & Plan  In a previous visit, he had indicated to palliative that he would consider hospice/comfort care  Currently, vascular recommend surgery and patient is agreeable  Consulted palliative care for further goals of care discussion after surgery    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)- (present on admission)  Assessment & Plan  Right BKA and left great toe amputation    Cardiomyopathy, ischemic- (present on admission)  Assessment & Plan  EF from heart cath 5/10 15-20%    Peripheral vascular disease (HCC)- (present on admission)  Assessment & Plan  Hx of multiple amputations     Paroxysmal A-fib (HCC)- (present on admission)  Assessment & Plan  Chronic condition  Hold Eliquis in case he needs surgery and utilize SQ heparin for DVT prophylaxis    Hyponatremia- (present on admission)  Assessment & Plan  Mild, continue to monitor       VTE prophylaxis: sc hep

## 2021-05-25 PROBLEM — A41.9 SEPSIS (HCC): Status: ACTIVE | Noted: 2021-01-01

## 2021-05-25 NOTE — DISCHARGE PLANNING
Anticipated Discharge Disposition: TBD    Action: Discussed in IDT rounds. Patient discussed goals of care with palliative, pt now open to surgery.     CM spoke with Maribell from [facility]. Maribell states her agency is familiar with patient and ex spouse [place]. Per Maribell, pt had previously agreed to obtain Palliative care service through [facility] but consents were never signed due to readmission. Maribell notified CM she will keep in contact with pt/family and will be available to start service should patient decide to continue at discharge.     Barriers to Discharge: Medical clearance  Surgery decision/scheduling pending    Plan: Care coordination will follow and assist with discharge plan.

## 2021-05-25 NOTE — CARE PLAN
The patient is Watcher - Medium risk of patient condition declining or worsening    Shift Goals  Clinical Goals: Pain control  Patient Goals: Increase BP, Pain control    Progress made toward(s) clinical / shift goals:  Pt's blood glucose was in ideal range this evening. BP is still low but pt denies dizziness. Pt reports pain under control when resting in bed.       Problem: Knowledge Deficit - Standard  Goal: Patient and family/care givers will demonstrate understanding of plan of care, disease process/condition, diagnostic tests and medications  Outcome: Progressing     Problem: Pain - Standard  Goal: Alleviation of pain or a reduction in pain to the patient’s comfort goal  Outcome: Progressing     Problem: Diabetes Management  Goal: Patient will achieve and maintain glucose in satisfactory range  Outcome: Progressing     Problem: Knowledge Deficit - Diabetes  Goal: Patient will demonstrate knowledge of insulin injection, symptoms, and treatment of hypoglycemia and diet prior to discharge  Outcome: Progressing       Patient is not progressing towards the following goals:

## 2021-05-25 NOTE — PROGRESS NOTES
[facility] Nephrology Daily Progress Note    Date of Service  5/25/2021    Author: Marvin Mahajan M.D.    Chief Complaint  74 y/o man with ESRD HD T,TH,Sat at [place] [facility] presented with hypotension and inability to HD. On routine labs noted to have hyperkalemia.  Pt has increased erythema of 4th toe on left with some spreading erythema. Pt reports that this is worsening over the last few weeks, but though it secondary to trauma.     No F/C/N/V/CP/SOB.  No melena, hematochezia, hematemesis.  No HA, visual changes, or abdominal pain.    Daily Nephrology Summary:   5/20 - Consult done  5/21 - sitting up in bed, feeling good, appetite good, mild non-productive cough, tolerated HD yesterday UF: 158ml  5/22 - seen on HD, some hypotension this am, limited UF with HD, vascular to consult   5/23 - sitting up in bed, c/o severe pain in foot and L hand, vascular saw pt and plan poss intervention   5/24 - NAEO, feels good, having issues with dropping objects recently over the past 3-4 days  5/25 - NAEO, no complaints, sleeping comfortably    Review of Systems  Review of Systems   Constitutional: Negative for chills and fever.   Respiratory: Negative for cough and shortness of breath.    Cardiovascular: Negative for chest pain and leg swelling.   Gastrointestinal: Negative for nausea and vomiting.   Neurological: Negative for seizures and loss of consciousness.   All other systems reviewed and are negative.     Physical Exam  Temp:  [36.4 °C (97.6 °F)-38.3 °C (100.9 °F)] 38.3 °C (100.9 °F)  Pulse:  [] 89  Resp:  [15-19] 18  BP: ()/(27-51) 103/47  SpO2:  [91 %-97 %] 96 %    Physical Exam  Vitals and nursing note reviewed.   Constitutional:       Appearance: Normal appearance.   HENT:      Head: Normocephalic and atraumatic.   Eyes:      General: No scleral icterus.        Right eye: No discharge.         Left eye: No discharge.   Cardiovascular:      Rate and Rhythm: Normal rate and regular rhythm.   Pulmonary:       Effort: Pulmonary effort is normal.      Breath sounds: Normal breath sounds.   Abdominal:      General: Bowel sounds are normal.      Palpations: Abdomen is soft.   Musculoskeletal:         General: Deformity (Necrotic 3rd toe on left foot) present. No tenderness.      Cervical back: Neck supple. No muscular tenderness.      Right lower leg: No edema.      Left lower leg: No edema.   Neurological:      Mental Status: He is alert. Mental status is at baseline.     Fluids    Intake/Output Summary (Last 24 hours) at 5/25/2021 0937  Last data filed at 5/24/2021 1400  Gross per 24 hour   Intake 220 ml   Output --   Net 220 ml     Laboratory  Recent Labs     05/23/21  0617 05/24/21  0514 05/25/21  0318   WBC 7.0 9.8 11.6*   RBC 2.25* 2.05* 2.08*   HEMOGLOBIN 7.5* 7.0* 7.1*   HEMATOCRIT 23.4* 22.1* 21.5*   .0* 107.8* 103.4*   MCH 33.3* 34.1* 34.1*   MCHC 32.1* 31.7* 33.0*   RDW 60.8* 62.7* 59.2*   PLATELETCT 190 194 218   MPV 10.1 9.7 9.8     Recent Labs     05/23/21  0203 05/24/21  0514 05/25/21  0318   SODIUM 131* 132* 133*   POTASSIUM 4.3 5.0 5.0   CHLORIDE 93* 95* 94*   CO2 28 26 23   GLUCOSE 243* 115* 112*   BUN 23* 33* 44*   CREATININE 4.83* 6.45* 8.37*   CALCIUM 8.8 9.2 9.2         No results for input(s): NTPROBNP in the last 72 hours.        Imaging  - Reviewed    Assessment/Plan  # ESRD   maint HD q  T/T/S via   AVF. Left arm   # Hypotension: cardiogenic vs toe/cellultis   midodrine  # PVD with left 4th great toe discoloration   Per vascular    abx   # Cardiomyopathy: acute worsening. EF now 15-20%  # 80% ostial LAD in-stent restenosis:   # Steal syndrome: s/p DRI. Worsening apparent ischmia in setting of heart failure  # Anemia in CKD   Iron replete    ARMANDO   # Atrial fibrillation   plavix    BB  # Diabetes mellitus  # Hyperthyroidism   Tapazole  #Hyponatremia, resolved    UF with HD   # Hyperkalemia, corrected    Manage with HD   #Hyperphosphatemia   sevelamer TID with meals   #  BMD-CKD   Calcitriol   Sevelamer     Plan  - TTS iHD  - UF with HD as tolerated   - DC IVFs, okay to bolus PRN symptomatic hypotension  - Continue binders  - Dose all meds per ESRD

## 2021-05-25 NOTE — PROGRESS NOTES
" [facility]  PROGRESS NOTE    HPI:  Luis Sepulveda is a 75 y.o.  with a past medical history that includes type 2 diabetes,  ESRD on hemodialysis, PAD, Afib, L great toe amputation with tenotomy, HLD, s/p right BKA (12/2020 with Dr. Lynn) admitted 5/20/2021 for Cellulitis of left foot [L03.116].   LPS has been consulted for evaluation of necrotic left 4th toe. Patient reports he has occasional \"twinges\" of pain to left 4-5 toes since approximately early April 2021. He states he was unaware of any wounds or discoloration of his left toes. He states he was discharged from rehab in early April 2020 and has a home health Rn that comes to his home weekly every Friday. He states the home health RN had provided him with a mirror to check his feet but he is unable to adequately check his feet due to vision deterioration. Pain is worse with touch, relieved with rest and non-weight bearing. Denies fever, chills, nausea, vomiting, CP, HA, SOB.         INTERVAL HISTORY:  5/23/2021: Patient denies fevers, chills, nausea, vomiting.  Pain well controlled.   5/25/21: receiving HD at bedside. Seen by Dr. Encinas, no plans for revascularization or major vascular surgery.  Patient experiencing significant pain to left foot with light touch, describes pain as a \"vice\" around foot.  Per RN, BP low, concerns for sepsis, given fluid bolus and now to be restarted on antibiotics        PERTINENT LPS RESULTS:   COVID-19: Not detected this admission     X-ray:      Dx-foot-2-left 5/20/2021     FINDINGS:  Prior partial amputation of great toe at the base of proximal phalanx.  Soft tissue swelling of 4th toe.  No soft tissue gas.  Diffuse vascular calcifications.  No focal bony destruction.  Calcaneal enthesophytes.  Calcification dorsal to calcaneus, possibly within Achilles tendon, chronic.     IMPRESSION:     1.  Soft tissue swelling of LEFT 4th toe.  2.  No gross bony destruction however osteomyelitis is not excluded " by plain film.  3.  Prior partial amputation of great toe.        MRI: none this admission     Arterial studies:   US-PATSY single level bilateral 5/21/2021                     RIGHT      Waveform            Systolic BPs (mmHg)                              120           Brachial                                            Common Femoral                                            Posterior Tibial                                            Dorsalis Pedis                                            Peroneal                                            PATSY                                            TBI                           LEFT   Waveform        Systolic BPs (mmHg)                                            Brachial   Triphasic                                Common Femoral   Monophasic                 127           Posterior Tibial   Monophasic                 45            Dorsalis Pedis                                            Peroneal                              1.06          PATSY                                            TBI         Findings   Right-   Ankle pressure not obtained due to below the knee amputation.    Bandage over the common femoral artery.    Doppler waveform of the popliteal artery is of high amplitude and    triphasic.       Left-   Ankle-brachial index of the dorsalis pedis artery is severely reduced.    Ankle-brachial index of the posterior tibial artery is normal.    Doppler waveform of the common femoral artery is of high amplitude and    triphasic.    Doppler waveforms at the ankle are monophasic with moderate amplitude.       PPG's-   No flow       Bilateral arterial duplex scan was performed in accordance with lower    extremity arterial evaluation protocol - see separate report.        US-extremity artery lower bilateral 5/21/2021      RIGHT   Waveform        Peak Systolic Velocity (cm/s)                   Prox    Prox-Mid  Mid    Mid-Dist  Distal   Triphasic                         52  "                      CFA         Biphasic        55                                         PFA         Biphasic        44                65      24       56      SFA         Biphasic                          35                       POP         Monophasic      62                                         AT         Monophasic      51                                         PT         Not                                                        LISA   attained                    LEFT   Waveform        Peak Systolic Velocity (cm/s)                   Prox    Prox-Mid  Mid    Mid-Dist  Distal   Triphasic                         132                      CFA         Biphasic        43                                         PFA         Biphasic        50                62               42      SFA         Triphasic                         56                       POP         Monophasic      48                                 70      AT         Monophasic      37                                 30      PT         Absent          0                                  54      LISA               FINDINGS   Right-   Calcific atherosclerotic plaque seen throughout the lower extremity.    Stenosis of the distal superficial femoral artery. Velocities are    consistent with 50-75% stenosis.    Proximal anterior and posterior tibial artery waveforms are monophasic.    Proximal peroneal artery was not visualized.       Left-   Calcific atherosclerotic plaque seen throughout the lower extremity.   Monophasic waveforms seen in the anterior and posterior tibial arteries.   Absent flow seen in the proximal peroneal artery.         EXAM:      /48   Pulse 89   Temp 37.3 °C (99.2 °F) (Temporal)   Resp 16   Ht 1.676 m (5' 6\")   Wt 75.8 kg (167 lb)   SpO2 96%   BMI 26.95 kg/m²     Pedal Pulses: Unable to palpate left DP/PT pulses, soft monophasic DP/PT pulses via Doppler  Faintly palpable popliteal bilaterally  Sensation: " "Hypersensitive with light touch to left foot      Wound :    Left dorsal foot  Dark necrotic/dry gangrenous left fourth toe.  Left 2, 3, 5 toes are cold with dark purple discoloration  Mild purple discoloration extends the entire length of plantar foot  Patient leg is warm until midfoot then becomes cold to touch  Previous erythema to left lower leg area that was marked has improved  2+ edema to left foot, likely dependent edema.    No drainage or odor.      Left dorsal foot    Left plantar foot          DIABETES MANAGEMENT:    Blood glucose:   Results from last 7 days   Lab Units 05/25/21  0828 05/24/21  2212 05/24/21  1809 05/24/21  1656 05/24/21  1352 05/24/21  0921 05/23/21  2048 05/23/21  1743   ACCU CHECK GLUCOSE 788 mg/dL 95 123* 113* 111* 104* 121* 126* 137*     A1c:   Lab Results   Component Value Date/Time    HBA1C 5.7 (H) 05/11/2021 06:30 AM            INFECTION MANAGEMENT:    Results from last 7 days   Lab Units 05/25/21  0318 05/24/21  0514 05/23/21  0617 05/23/21  0203 05/22/21  0329 05/21/21  1309   WBC 1501 K/uL 11.6* 9.8 7.0 7.9 8.2 7.2   PLATELET COUNT 1518 K/uL 218 194 190 178 253 196     Wound culture results:   Results     Procedure Component Value Units Date/Time    Blood Culture [[id number]]     Order Status: No result Specimen: Blood from Peripheral     Blood Culture [[id number]]     Order Status: No result Specimen: Blood from Peripheral     BLOOD CULTURE [[id number]] Collected: 05/20/21 0740    Order Status: Completed Specimen: Blood from Peripheral Updated: 05/25/21 0900     Significant Indicator NEG     Source BLD     Site PERIPHERAL     Culture Result No growth after 5 days of incubation.    Narrative:      Per Hospital Policy: Only change Specimen Src: to \"Line\" if  specified by physician order.  No site indicated    BLOOD CULTURE [[id number]] Collected: 05/20/21 0748    Order Status: Completed Specimen: Blood from Peripheral Updated: 05/25/21 0900     Significant Indicator NEG     Source " "BLD     Site PERIPHERAL     Culture Result No growth after 5 days of incubation.    Narrative:      Per Hospital Policy: Only change Specimen Src: to \"Line\" if  specified by physician order.  No site indicated    CULTURE WOUND W/ GRAM STAIN [[id number]]  (Abnormal)  (Susceptibility) Collected: 05/20/21 0759    Order Status: Completed Specimen: Wound from Left Foot Updated: 05/24/21 0710     Significant Indicator POS     Source WND     Site LEFT FOOT     Culture Result Light growth usual skin xander.     Gram Stain Result Rare WBCs.  Many Gram positive cocci.  Few Gram negative rods.       Culture Result Klebsiella pneumoniae ESBL  Moderate growth  Extended Spectrum Beta-lactamase (ESBL) isolated.  ESBL's may be clinically resistant to therapy with  Penicillins,Cephalosporins or Aztreonam despite  apparent in vitro susceptibility to some of these agents.  The patient requires contact isolation.  Please contact pharmacy or an Infectious Disease Specialist  if you have any questions about appropriate therapy.        Proteus mirabilis  Light growth        Staphylococcus aureus  Moderate growth  This isolate is presumed to be clindamycin resistant based on  detection of inducible resistance.  Clindamycin may still  be effective in some patients.      Narrative:      CALL  [facility]  131 tel. [phone],  CALLED  131 tel. [phone] 05/22/2021, 09:32, RB PERF. RESULTS CALLED TO: RN  [id number]    Susceptibility     Klebsiella pneumoniae esbl (1)     Antibiotic Interpretation Microscan Method Status    Ceftriaxone Extended Spectrum Beta Lactamase >32 mcg/mL BINU Final    Cefazolin Resistant >16 mcg/mL BINU Final    Ciprofloxacin Resistant >2 mcg/mL BINU Final    Cefepime Resistant >16 mcg/mL BINU Final    Cefuroxime Resistant >16 mcg/mL BINU Final    Ertapenem Sensitive <=0.5 mcg/mL BINU Final    Gentamicin Sensitive <=2 mcg/mL BINU Final    Moxifloxacin Intermediate 4 mcg/mL BINU Final    Ampicillin/sulbactam Intermediate 16/8 mcg/mL BINU " Final    Pip/Tazobactam Sensitive <=8 mcg/mL BINU Final    Trimeth/Sulfa Resistant >2/38 mcg/mL BINU Final    Tigecycline Sensitive <=2 mcg/mL BINU Final    Tobramycin Sensitive <=2 mcg/mL BINU Final          Proteus mirabilis (2)     Antibiotic Interpretation Microscan Method Status    Ceftriaxone Sensitive <=1 mcg/mL BINU Final    Cefazolin Sensitive <=2 mcg/mL BINU Final    Ciprofloxacin Sensitive <=0.25 mcg/mL BINU Final    Cefepime Sensitive <=2 mcg/mL BINU Final    Cefuroxime Sensitive <=4 mcg/mL BINU Final    Ertapenem Sensitive <=0.5 mcg/mL BINU Final    Gentamicin Sensitive <=2 mcg/mL BINU Final    Moxifloxacin Sensitive <=2 mcg/mL BINU Final    Pip/Tazobactam Sensitive <=8 mcg/mL BINU Final    Trimeth/Sulfa Sensitive <=0.5/9.5 mcg/mL BINU Final    Tobramycin Sensitive <=2 mcg/mL BINU Final    Ampicillin Sensitive <=8 mcg/mL BINU Final          Staphylococcus aureus (3)     Antibiotic Interpretation Microscan Method Status    Cefazolin Sensitive <=8 mcg/mL BINU Final    Cefepime Sensitive <=4 mcg/mL BINU Final    Ampicillin/sulbactam Sensitive <=8/4 mcg/mL BINU Final    Pip/Tazobactam Sensitive <=8 mcg/mL BINU Final    Vancomycin Sensitive 1 mcg/mL BINU Final    Trimeth/Sulfa Sensitive <=0.5/9.5 mcg/mL BINU Final    Tetracycline Sensitive <=4 mcg/mL BINU Final    Azithromycin Resistant >4 mcg/mL BINU Final    Clindamycin Resistant <=0.25 mcg/mL BINU Final    Ceftaroline Sensitive <=0.5 mcg/mL BINU Final    Daptomycin Sensitive <=0.5 mcg/mL BINU Final    Erythromycin Resistant >4 mcg/mL BINU Final    Oxacillin Sensitive 0.5 mcg/mL BINU Final            Condensed View                   GRAM STAIN [[id number]] Collected: 05/20/21 0759    Order Status: Completed Specimen: Wound Updated: 05/23/21 0719     Significant Indicator .     Source WND     Site LEFT FOOT     Gram Stain Result Rare WBCs.  Many Gram positive cocci.  Few Gram negative rods.      Narrative:      CALL  [facility]  131 tel. [phone],  CALLED  131 tel. [phone] 05/22/2021,  "09:32, RB PERF. RESULTS CALLED TO: RN  [phone]    SARS-CoV-2 PCR (24 hour In-House): Collect NP swab in [facility] [[id number]] Collected: 05/20/21 0940    Order Status: Completed Specimen: Respirate Updated: 05/20/21 1259     SARS-CoV-2 Source NP Swab     SARS-CoV-2 by PCR NotDetected     Comment: PATIENTS: Important information regarding your results and instructions can  be found at [email or web address]   \"After your  Covid-19 Test\"    [facility] providers: PLEASE REFER TO DE-ESCALATION AND RETESTING PROTOCOL  on [email or web address]    **The TaqPath COVID-19 SARS-CoV-2 RT-PCR test has been made available for  use under the Emergency Use Authorization (EUA) only.         Narrative:      Have you been in close contact with a person who is suspected  or known to be positive for COVID-19 within the last 30 days  (e.g. last seen that person < 30 days ago)->No                     ASSESSMENT/PLAN:   This is a 75-year-old male with a history of type 2 diabetes, PAD, ESRD on HD, right BKA, recent NSTEMI, EF 15-20%, left anterior tibial artery angioplasty, percutaneous thrombectomy of left peroneal artery, left peroneal artery angioplasty with Dr. Encinas 04/2021.    -Patient's left fourth toe remains necrotic.  Left 2, 3, 5 toes are with worsening purple discoloration and are now cold, no longer cool to touch.  Now presents with purple discoloration that extends the entire length of plantar foot.  -Per vascular surgery not a candidate for major revascularization and no focal flow-limiting lesion was seen on duplex.    -Patient experiencing worsening arterial pain to left foot  -Patient has spoken with palliative care and would like to proceed with surgery and continue with dialysis.  -Patient would like to proceed with left BKA.  N.p.o. at midnight.  Discussed with Dr. Márquez. surgery to be scheduled for tomorrow.    Patient remains hopeful that he would be able to heal a left BKA and be successful in " "physical therapy in order to return to his \"normal life\".       Wound care:     Wound care orders to be updated postop      Labs/Imaging:  -COVID-19: not detected this admission on this admission 5/20/2021  -no further labs or imaging at this time      Vascular status:   - Unable to palpate left DP/PT pulses, soft monophasic DP/PT pulses via Doppler  -Faintly palpable popliteal pulses bilaterally  -Vascular surgery Dr. Encinas involved.  No plans for major revascularization surgery and no focal flow-limiting lesion was seen on duplex.  Vascular recommending amputation per LPS      Surgery:   -N.p.o. midnight  -Left BKA with Dr. Márquez tomorrow    Antibiotics:   -ID involved.  Now has signed off  On Zosyn, antibiotics were restarted today due to concerns of sepsis    Weight Bearing Status:   -Heel weight bearing LLE    Offloading:   -Prevalon boot to left lower extremity while in bed, boot at bedside.  Patient unable to tolerate.  -Orthotic company: [employer]    PT/OT :   None at this time  PT OT to be consulted postop      Diabetes Education:   -A1c 5.7% on 5/11/2021  Diabetes educator not involved at this time    - Implications of loss of protective sensation (LOPS) discussed with patient- including increased risk for amputation.  Advised to check feet at least daily, moisturize feet, and to always wear protective foot wear.   -avoid trimming own nails. See podiatrist or certified foot and nail RN  -keep blood sugars <150 for improved wound healing        DISCHARGE PLAN:    Disposition:  Recommend rehab, SNF at discharge      Follow-up: TBD      Discussed with: pt, RN, dialysis RN, Dr. Tolentino, Dr. Márquez     Please note that this dictation was created using voice recognition software. I have  worked with technical experts from [employer] to optimize the interface.  I have made every reasonable attempt to correct obvious errors, but there may be errors of grammar and possibly content that I did not discover " before finalizing the note.    PETRONA Owen.P.R.N.    If any questions or concerns, please contact LPS through voalte.

## 2021-05-25 NOTE — PROGRESS NOTES
"1015 Pt getting dialysis in room due to contact precautions.    1200 Pt verbalizing extreme pain on LLE. Only medication available is tramadol. Patient requesting stronger pain medication. Patient has \"hallucinations\" listed as a side effect for opioids. Patient verbalizes he is willing to take the opioids due to extreme pain. Paged Dr. Tolentino.    1220  Second paged initiated. No new orders received. Patient medicated with tramadol.     1745 Patient's BP 77/36 rechecked 2x. Patient asymptomatic. A&ox4. Denies dizziness. Order for 500 mL bolus received.    1800 Report and updates given to JAN Hickey      "

## 2021-05-25 NOTE — PROGRESS NOTES
Progress Note    CC: f/u for L 4th toe gangrene    Interval History:  Pt w/ some dementia, poor historian. He is a 75 y.o. male w/ long-standing vascular history and prior interventions for CLI, hx of R BKA, admitted w/ L 4th toe gangrene.    His health has declined significantly over the past year. He has hx of Afib on eliquis, ESRD on HD via LUE AVF (hx of steal s/p DRIL recently) still w/ some hand ischemia but declined AVF ligation, CAD s/p MI <2wks ago w/ EF 15-20% (down from 55% last fall) w/ unsuccessful PCI of an 80% ostial LAD lesion. There was tentative plan for for staged PCI w/ possible LV assist on 5/17 but this was abandoned.    Palliative care was consulted for goals of care.    Review of Systems  Negative for chest pain or SOB currently  Negative for stroke/TIA    Medications  Prior to Admission Medications   Prescriptions Last Dose Informant Patient Reported? Taking?   apixaban (ELIQUIS) 5mg Tab 5/19/2021 at PM Patient No No   Sig: Take 1 tablet by mouth 2 times a day. Indications: Thromboembolism secondary to Atrial Fibrillation   atorvastatin (LIPITOR) 40 MG Tab 5/19/2021 at PM Patient No No   Sig: Take 1 tablet by mouth at bedtime.   calcitRIOL (ROCALTROL) 0.25 MCG Cap 5/19/2021 at AM Patient No No   Sig: Take 1 capsule by mouth every day.   carvedilol (COREG) 3.125 MG Tab 5/19/2021 at PM Patient No No   Sig: Take 1 tablet by mouth 2 times a day with meals. Hold if blood pressure <100 and heart rate <60   clopidogrel (PLAVIX) 75 MG Tab 5/19/2021 at AM Patient No No   Sig: Take 1 tablet by mouth every day.   gabapentin (NEURONTIN) 300 MG Cap 5/19/2021 at PM Patient No No   Sig: Take 1 capsule by mouth every day.   insulin glargine (LANTUS SOLOSTAR) 100 UNIT/ML Solution Pen-injector injection 5/19/2021 at PM Patient No No   Sig: Inject 8 Units under the skin at bedtime for 30 days.   methimazole (TAPAZOLE) 5 MG Tab 5/19/2021 at PM Patient No No   Sig: Take 1 tablet by mouth 2 times a day.      midodrine (PROAMATINE) 10 MG tablet 5/19/2021 at PM Patient No No   Sig: Take 1 tablet by mouth 3 times a day with meals.   omeprazole (PRILOSEC) 20 MG delayed-release capsule 5/19/2021 at PM Patient No No   Sig: Take 1 capsule by mouth 2 times a day.   oxyCODONE-acetaminophen (PERCOCET) 5-325 MG Tab PRN at PRN Patient No No   Sig: Take 1 tablet by mouth every 8 hours as needed for Severe Pain for up to 5 days.   sevelamer carbonate (RENVELA) 800 MG Tab tablet 5/19/2021 at PM Patient No No   Sig: Take 2 Tablets by mouth 3 times a day with meals.   tamsulosin (FLOMAX) 0.4 MG capsule 5/19/2021 at PM Patient No No   Sig: Take 2 Capsules by mouth at bedtime.   traZODone (DESYREL) 50 MG Tab 5/19/2021 at PM Patient No No   Sig: Take 1 tablet by mouth at bedtime.      Facility-Administered Medications: None         Physical Exam  Vitals:    05/24/21 1559   BP: (!) 99/38   Pulse: 85   Resp: 17   Temp: 37.2 °C (99 °F)   SpO2: 97%       Pulse/Extremity Exam:    Femorals:        Right: palpable       Left palpable  Pedal Pulses:              Left DP monophasic       Left PT monophasic    Wounds: L 4th toe and medial 5th toe dusky. Skin intact, no purulence      General appearance: NAD, conversing but confused, poor memory  Neuro: CN II-XII grossly intact.   Neck: full range of motion  Lungs: No inspiratory stridor or wheezing  CV: RRR  Abdomen: Soft, NT/ND  Skin: No rashes      Labs reviewed today:  Recent Labs     05/23/21  0203 05/23/21  0617 05/24/21  0514   WBC 7.9 7.0 9.8   RBC 2.08* 2.25* 2.05*   HEMOGLOBIN 7.1* 7.5* 7.0*   HEMATOCRIT 22.2* 23.4* 22.1*   .7* 104.0* 107.8*   MCH 34.1* 33.3* 34.1*   MCHC 32.0* 32.1* 31.7*   RDW 62.4* 60.8* 62.7*   PLATELETCT 178 190 194   MPV 10.0 10.1 9.7     Recent Labs     05/22/21  0329 05/23/21  0203 05/24/21  0514   SODIUM 136 131* 132*   POTASSIUM 4.4 4.3 5.0   CHLORIDE 95* 93* 95*   CO2 27 28 26   GLUCOSE 121* 243* 115*   BUN 36* 23* 33*   CREATININE 6.13* 4.83* 6.45*    CALCIUM 9.2 8.8 9.2     Recent Labs     21  0329 21  0203 21  0514   ALTSGPT 8 5 5   ASTSGOT 11* 9* 7*   ALKPHOSPHAT 94 103* 115*   TBILIRUBIN 0.3 0.3 0.3   GLUCOSE 121* 243* 115*                 No results for input(s): TROPONINI in the last 72 hours.    Urinalysis:    No results found     Imaging & Data Review:  I personally reviewed all non-invasive vascular testing including images, x-rays, tracings, arterial waveforms, and duplex exams relevant to this admission. My interpretation is below:    21 [facility] PATSY: Reports L 1.06 with monophasic waveforms. Likely falsely elevated due to medial wall calcification and non-compressibility given monophasic waveforms and no obtainable LE digital PPG waveforms     21 [facility] Arterial Duplex:    ? Left Lower Extremity Report: Calcific atherosclerotic plaque seen throughout the lower extremity. Monophasic waveforms seen in the anterior and posterior tibial arteries. Absent flow seen in the proximal peroneal artery.  § Calcific vessel walls throughout LLE. Left CFA, SFA, Prof, pop without hemodynamically. Monophasic waveforms throughout L PT and AT. Proximal L peroneal occlusion with distal recon.    Assessment/Plan & Medical Decision-MakinM w/ multiple comorbidities, declining health over the past several months w/ severe untreated CAD w/ MI <2wks ago, persistent 80% LAD lesion, severely depressed EF relative to a few months ago w/ global hypokinesis, dementia, and ESRD w/ steal despite DRIL admitted w/ gangrene of the L 4th/5th toes. No flow-limiting lesion on duplex from the CFA through the pop. Tibials are calcified but patent.    Pt is not a candidate for any major revasc attempt and no focal flow-limiting lesion is seen on duplex.     Will defer to LPS for wound care and any necessary amp.    Thank you for involving us in this patient's care. Please call with questions.        Mario Encinas MD  Vascular Surgeon  [facility]  Office: [phone]

## 2021-05-25 NOTE — PROGRESS NOTES
Hospital Medicine Daily Progress Note    Date of Service  5/25/2021    Chief Complaint  75 y.o. male admitted 5/20/2021 with weakness and dizziness    Hospital Course  75 y.o. male who presented 5/20/2021 with left foot pain  Mr. Sepulveda has a past medical history of end-stage renal disease on dialysis, peripheral vascular disease status post right below the knee amputation, insulin-dependent diabetes mellitus, there was most recently admitted here from 5/10/2021 through 5/18/2021 with shortness of breath.  During that hospitalization he underwent heart catheterization revealing ejection fraction of 15 to 20% with 80% ostial LAD in-stent restenosis and distal RCA disease.  He presents back today with 4 to 5 days of left foot pain especially when walking with his walker.  He has been compliant with dialysis and went on Tuesday.  He was admitted for cellulitis of the left foot with what appears to be an ischemic left fourth toe.  He has been on Eliquis which was be held in case he requires amputation.  Upon admission, he was seen by LPS.  ID saw him as well and DC'd his antibiotics as he was not septic.  Palliative care saw him and patient does want surgery for now.  Vascular is evaluating him to see if he is a candidate for surgery.        5/21: PATSY done and arterial ultrasound pending.  Patient feels okay with no fevers or chills so far.  5/22: Find of some loose stools.  Culture started growing ESBL.  Consulted ID Dr. Love.  5/23: Hgb did decrease to 7.1 but stable at 7.5 on repeat.  I have asked and heparin were appropriately held overnight, now resuming.   5/24: Seen by palliative.  Patient does want surgery for now.  Updated vascular.    Interval Problem Update  Patient evaluated bedside and found to be sleepy and reporting left foot pain  BP in the low range, lactic acid stable, hold Coreg  100.9 fever today  WBC higher today  Wound culture growing Klebsiella pneumonia ESBL, but use me diabetes, and Staph  aureus all sensitive to Zosyn  Started IV Zosyn  Per vascular surgery, not candidate for major revascularization  LPS following who discussed with surgery for which patient is scheduled for left BKA on a.m.    Disposition  Left BKA on a.m.    Review of Systems  Review of Systems   Constitutional: Negative for fever.   Respiratory: Negative for cough, shortness of breath and wheezing.    Cardiovascular: Negative for chest pain.   Gastrointestinal: Positive for diarrhea (loose Stools). Negative for abdominal pain, constipation, nausea and vomiting.   Genitourinary: Negative for dysuria.   Musculoskeletal: Positive for joint pain.   Neurological: Negative for headaches.   Psychiatric/Behavioral: The patient is nervous/anxious.         Physical Exam  Temp:  [36.4 °C (97.6 °F)-38.3 °C (100.9 °F)] 37.1 °C (98.7 °F)  Pulse:  [] 66  Resp:  [15-20] 16  BP: ()/(27-51) 77/36  SpO2:  [91 %-97 %] 94 %    Physical Exam  Constitutional:       General: He is not in acute distress.     Appearance: He is well-developed. He is not diaphoretic.   HENT:      Head: Normocephalic and atraumatic.      Right Ear: External ear normal.      Left Ear: External ear normal.      Mouth/Throat:      Pharynx: No oropharyngeal exudate or posterior oropharyngeal erythema.   Eyes:      Extraocular Movements: Extraocular movements intact.   Cardiovascular:      Rate and Rhythm: Normal rate.      Heart sounds: No gallop.    Pulmonary:      Effort: No respiratory distress.      Breath sounds: No wheezing.   Abdominal:      General: There is no distension.      Tenderness: There is no abdominal tenderness.   Musculoskeletal:         General: Tenderness present.      Comments: Right BKA  Left fourth toe with necrosis and tenderness to palpation   Skin:     General: Skin is warm.   Neurological:      Mental Status: He is alert and oriented to person, place, and time. Mental status is at baseline.      Motor: No weakness.   Psychiatric:          Mood and Affect: Mood normal.         Behavior: Behavior normal.         Fluids    Intake/Output Summary (Last 24 hours) at 5/25/2021 1747  Last data filed at 5/25/2021 1549  Gross per 24 hour   Intake 1140 ml   Output 40 ml   Net 1100 ml       Laboratory  Recent Labs     05/23/21  0617 05/24/21  0514 05/25/21  0318   WBC 7.0 9.8 11.6*   RBC 2.25* 2.05* 2.08*   HEMOGLOBIN 7.5* 7.0* 7.1*   HEMATOCRIT 23.4* 22.1* 21.5*   .0* 107.8* 103.4*   MCH 33.3* 34.1* 34.1*   MCHC 32.1* 31.7* 33.0*   RDW 60.8* 62.7* 59.2*   PLATELETCT 190 194 218   MPV 10.1 9.7 9.8     Recent Labs     05/23/21  0203 05/24/21  0514 05/25/21  0318   SODIUM 131* 132* 133*   POTASSIUM 4.3 5.0 5.0   CHLORIDE 93* 95* 94*   CO2 28 26 23   GLUCOSE 243* 115* 112*   BUN 23* 33* 44*   CREATININE 4.83* 6.45* 8.37*   CALCIUM 8.8 9.2 9.2     Recent Labs     05/25/21  0944   INR 1.28*               Imaging  US-EXTREMITY ARTERY LOWER BILAT   Final Result      US-PATSY SINGLE LEVEL BILAT   Final Result      DX-FOOT-2- LEFT   Final Result      1.  Soft tissue swelling of LEFT 4th toe.   2.  No gross bony destruction however osteomyelitis is not excluded by plain film.   3.  Prior partial amputation of great toe.      DX-CHEST-PORTABLE (1 VIEW)   Final Result      Left lower lobe pleural thickening and calcification may be related to sequelae of prior infection or trauma.      Patchy left basilar opacity may represent atelectasis or pneumonitis.      Mild cardiomegaly.      Atherosclerotic plaque.              Assessment/Plan  * Cellulitis of left foot- (present on admission)  Assessment & Plan  One dose of vancomycin given in the ER  Switched to IV Unasyn  On 5/22, foot wound culture grew ESBL  ID Dr. Love consulted  Antibiotics DC'd ID -monitor and may resume if patient does develop cellulitis  Resumed IV antibiotics on 5/25/2021 (worsening clinical picture, fourth inflammation, increasing WBC and mild fever)  Patient for left BKA on a.m.    Ischemia of  toe- (present on admission)  Assessment & Plan  Left 4th toe ischemia  Limb preservation service consulted  He likely will need amputation  Hold Eliis for possible surgery.  PATSY abnormal.  Ultrasound severe arterial occlusion  Vascular surgery consulted by LPS  No intervention as per vascular surgery team  LPS discussed case with surgery, patient for left BKA on a.m.  Keep n.p.o.    Hypotension- (present on admission)  Assessment & Plan  Chronic condition  Discontinue coreg  Continue home midodrine    End stage renal disease on dialysis (HCC)- (present on admission)  Assessment & Plan  Dialysis via left arm fistula T,Th,S  Dr. Hodges, nephrology was consulted from the ER    Type 2 diabetes mellitus with kidney complication, with long-term current use of insulin ([facility])- (present on admission)  Assessment & Plan  With hyperglycemia  Continue Lantus and sliding scale  Diabetic diet    Macrocytic anemia- (present on admission)  Assessment & Plan  Vit B12/folate unremarkable    CAD (coronary artery disease)- (present on admission)  Assessment & Plan  With hx of stents followed by [facility]    DNR (do not resuscitate)- (present on admission)  Assessment & Plan  Discussed with Mr. Sepulveda and he confirmed this status.    Advance care planning- (present on admission)  Assessment & Plan  In a previous visit, he had indicated to palliative that he would consider hospice/comfort care  Currently, vascular recommend surgery and patient is agreeable  Consulted palliative care for further goals of care discussion after surgery    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)- (present on admission)  Assessment & Plan  Right BKA and left great toe amputation    Cardiomyopathy, ischemic- (present on admission)  Assessment & Plan  EF from heart cath 5/10 15-20%    Peripheral vascular disease (HCC)- (present on admission)  Assessment & Plan  Hx of multiple amputations     Paroxysmal A-fib (HCC)- (present on  admission)  Assessment & Plan  Chronic condition  Hold Eliquis in case he needs surgery and utilize SQ heparin for DVT prophylaxis    Hyponatremia- (present on admission)  Assessment & Plan  Mild, continue to monitor       VTE prophylaxis: SCDs, or in a.m.

## 2021-05-25 NOTE — PROGRESS NOTES
[facility] Progress Note     HD treatment ordered by NIALL Lo x 3 hours.  Treatment Start time: 1121            Pause at: 1145, due to pt complain of severe left foot pain and doesn't want to continue treatment. Explained that we will pause the treatment and will resume if pain get better. Pt agrees.    Restarted after pain medication at: 1313           End time: 1549       Net UF + 860 ml    Pt  BP on 90's pre-treatment, post 500 ml NS bolus and midodrine by primary RN.    Patient tolerated treatment with UF off, SBP 's, asymptomatic. Also noted, that pt unable to handle BFR >300, complaining of severe L foot pain.    All blood was returned. LUE AVF needle removed. Dry gauze dressing applied and changed without bleeding issue. + Bruit/Thrill pre-post Treatment. See paper flow sheet for details.     Report given to EDGARDO Rothman RN.

## 2021-05-25 NOTE — CARE PLAN
Problem: Knowledge Deficit - Standard  Goal: Patient and family/care givers will demonstrate understanding of plan of care, disease process/condition, diagnostic tests and medications  Outcome: Progressing     Problem: Fall Risk  Goal: Patient will remain free from falls  Outcome: Progressing     Problem: Pain - Standard  Goal: Alleviation of pain or a reduction in pain to the patient’s comfort goal  Outcome: Progressing     Problem: Skin Integrity  Goal: Skin integrity is maintained or improved  Outcome: Progressing   The patient is Watcher - Medium risk of patient condition declining or worsening    Shift Goals  Clinical Goals: Pain control  Patient Goals: Increase BP, Pain control    Progress made toward(s) clinical / shift goals:Patient able to communicate needs. All questions answered at this time.   Non-slip socks are on, bed is locked and in lowest position, personal belongings are within reach, room is free of clutter and spills, call light is in place. Patient educated on how to use the call light and how to call for assistance. Patient verbalized understanding.    [id number].    Patient is not progressing towards the following goals:

## 2021-05-26 NOTE — PROGRESS NOTES
0800: RN received in report that pt Hgb < 7 and will require blood transfusion. Patient blood type is rare, blood bank is working on obtaining blood for patient.     1121: RBC available for patient. Patient is alert and oriented x 4 with mild episode of confusion (baseline per nightshift RN)     1500: blood transfusion completed.     1550: Patient is still hypotensive but asymptomatic, denies shortness of breath, dizziness or pain. A&O x4.     1600: Blood pressure checked manually, patient is still hypotensive. Doctor Rajan Alejandro aware. Hgb came back at 7.3, no new order given.

## 2021-05-26 NOTE — PROGRESS NOTES
[facility] Nephrology Daily Progress Note    Date of Service  5/26/2021    Author: Marvin Mahajan M.D.    Chief Complaint  74 y/o man with ESRD HD T,TH,Sat at [facility] presented with hypotension and inability to HD. On routine labs noted to have hyperkalemia.  Pt has increased erythema of 4th toe on left with some spreading erythema. Pt reports that this is worsening over the last few weeks, but though it secondary to trauma.     No F/C/N/V/CP/SOB.  No melena, hematochezia, hematemesis.  No HA, visual changes, or abdominal pain.    Daily Nephrology Summary:   5/20 - Consult done  5/21 - sitting up in bed, feeling good, appetite good, mild non-productive cough, tolerated HD yesterday UF: 158ml  5/22 - seen on HD, some hypotension this am, limited UF with HD, vascular to consult   5/23 - sitting up in bed, c/o severe pain in foot and L hand, vascular saw pt and plan poss intervention   5/24 - NAEO, feels good, having issues with dropping objects recently over the past 3-4 days  5/25 - NAEO, no complaints, sleeping comfortably  5/26 - NAEO, no complaints    Review of Systems  Review of Systems   Constitutional: Negative for chills and fever.   Respiratory: Negative for cough and shortness of breath.    Cardiovascular: Negative for chest pain and leg swelling.   Gastrointestinal: Negative for nausea and vomiting.   Neurological: Negative for seizures and loss of consciousness.   All other systems reviewed and are negative.     Physical Exam  Temp:  [36.3 °C (97.3 °F)-37.4 °C (99.4 °F)] 36.9 °C (98.5 °F)  Pulse:  [66-89] 80  Resp:  [15-20] 15  BP: ()/(30-65) 98/38  SpO2:  [94 %-99 %] 99 %    Physical Exam  Vitals and nursing note reviewed.   Constitutional:       Appearance: Normal appearance.   HENT:      Head: Normocephalic and atraumatic.   Eyes:      General: No scleral icterus.        Right eye: No discharge.         Left eye: No discharge.   Cardiovascular:      Rate and Rhythm: Normal rate and  regular rhythm.   Pulmonary:      Effort: Pulmonary effort is normal.      Breath sounds: Normal breath sounds.   Abdominal:      General: Bowel sounds are normal.      Palpations: Abdomen is soft.   Musculoskeletal:         General: Deformity (Necrotic 3rd toe on left foot) present. No tenderness.      Cervical back: Neck supple. No muscular tenderness.      Right lower leg: No edema.      Left lower leg: No edema.   Neurological:      Mental Status: He is alert. Mental status is at baseline.     Fluids    Intake/Output Summary (Last 24 hours) at 5/26/2021 1037  Last data filed at 5/25/2021 1549  Gross per 24 hour   Intake 900 ml   Output 40 ml   Net 860 ml     Laboratory  Recent Labs     05/25/21  0318 05/26/21  0428 05/26/21  0756   WBC 11.6* 12.3* 13.5*   RBC 2.08* 1.93* 1.94*   HEMOGLOBIN 7.1* 6.5* 6.5*   HEMATOCRIT 21.5* 20.6* 20.7*   .4* 106.7* 106.7*   MCH 34.1* 33.7* 33.5*   MCHC 33.0* 31.6* 31.4*   RDW 59.2* 62.0* 63.8*   PLATELETCT 218 215 207   MPV 9.8 10.0 9.9     Recent Labs     05/24/21  0514 05/25/21 0318 05/26/21  0428   SODIUM 132* 133* 133*   POTASSIUM 5.0 5.0 4.5   CHLORIDE 95* 94* 97   CO2 26 23 27   GLUCOSE 115* 112* 116*   BUN 33* 44* 29*   CREATININE 6.45* 8.37* 5.26*   CALCIUM 9.2 9.2 8.7     Recent Labs     05/25/21  0944   INR 1.28*     No results for input(s): NTPROBNP in the last 72 hours.        Imaging  - Reviewed    Assessment/Plan  # ESRD   maint HD q  T/T/S via   AVF. Left arm   # Hypotension: cardiogenic vs toe/cellultis   midodrine  # PVD with left 4th great toe discoloration   Per vascular    abx   # Cardiomyopathy: acute worsening. EF now 15-20%  # 80% ostial LAD in-stent restenosis:   # Steal syndrome: s/p DRI. Worsening apparent ischmia in setting of heart failure  # Anemia in CKD   Iron replete    ARMANDO   # Atrial fibrillation   plavix    BB  # Diabetes mellitus  # Hyperthyroidism   Tapazole  #Hyponatremia, resolved    UF with HD   # Hyperkalemia, corrected    Manage  with HD   #Hyperphosphatemia   sevelamer TID with meals   # BMD-CKD   Calcitriol   Sevelamer     Plan  - TTS iHD  - UF with HD as tolerated   - DC IVFs, okay to bolus PRN symptomatic hypotension  - Continue binders  - Dose all meds per ESRD

## 2021-05-26 NOTE — PROGRESS NOTES
Infectious Disease Progress Note    Author: Mary Kate Moser M.D. Date & Time of service: 2021  2:00 PM    Chief Complaint:  Gangrene left toes  Cellulitis    Interval History:  75 y.o. male admitted 2021. + diabetes, ESRD on HD, peripheral arterial disease, A. fib, history of left great toe amputation due to osteomyelitis in May 2020, hyperlipidemia, status post right BKA in 2020, CAD with recent ischemic cardiomyopathy, admitted on 2021 with left foot pain, found to have a necrotic appearing left fourth toe.  Swab obtained +ESBL Klebsiella, Proteus, MSSA   AF WBC ID reconsulted as patient with increased WBC and hemodynamically less stable (hypotension)+left foot pain    Labs Reviewed, Medications Reviewed, Radiology Reviewed and Wound Reviewed.    Review of Systems:  Review of Systems   Constitutional: Negative for fever.   Respiratory: Negative for cough and shortness of breath.    Cardiovascular: Negative for chest pain.   Neurological: Positive for sensory change.   All other systems reviewed and are negative.      Hemodynamics:  Temp (24hrs), Av °C (98.6 °F), Min:36.3 °C (97.3 °F), Max:37.7 °C (99.8 °F)  Temperature: 37.2 °C (99 °F)  Pulse  Av.3  Min: 61  Max: 108   Blood Pressure : (!) 95/38       Physical Exam:  Physical Exam  Vitals and nursing note reviewed.   Constitutional:       General: He is not in acute distress.     Appearance: He is not toxic-appearing or diaphoretic.   HENT:      Nose: No rhinorrhea.      Mouth/Throat:      Pharynx: No oropharyngeal exudate.      Comments: Poor dentition  Eyes:      Extraocular Movements: Extraocular movements intact.      Pupils: Pupils are equal, round, and reactive to light.   Cardiovascular:      Rate and Rhythm: Normal rate.   Pulmonary:      Effort: Pulmonary effort is normal. No respiratory distress.      Breath sounds: No stridor.   Abdominal:      General: There is no distension.      Palpations: Abdomen is soft.       Tenderness: There is no abdominal tenderness.   Musculoskeletal:      Comments: Right BKA  S/p left great toe amp  4 and 5th digits gangrenous  Ischemic changes to forefoot   Skin:     Coloration: Skin is not jaundiced.   Neurological:      General: No focal deficit present.      Mental Status: He is alert and oriented to person, place, and time.   Psychiatric:         Mood and Affect: Mood normal.         Behavior: Behavior normal.         Meds:    Current Facility-Administered Medications:   •  NS  •  meropenem  •  NS  •  acetaminophen  •  traMADol  •  traMADol  •  clopidogrel  •  [Held by provider] heparin  •  sevelamer carbonate  •  atorvastatin  •  calcitRIOL  •  [Held by provider] gabapentin  •  [Held by provider] insulin glargine  •  methimazole  •  midodrine  •  tamsulosin  •  senna-docusate **AND** polyethylene glycol/lytes **AND** [DISCONTINUED] magnesium hydroxide **AND** bisacodyl  •  ondansetron  •  ondansetron  •  insulin regular **AND** POC blood glucose manual result **AND** NOTIFY MD and PharmD **AND** glucose **AND** dextrose 50%  •  epoetin  •  heparin  •  guaiFENesin ER    Labs:  Recent Labs     05/24/21  0514 05/24/21  0514 05/25/21  0318 05/26/21  0428 05/26/21  0756   WBC 9.8   < > 11.6* 12.3* 13.5*   RBC 2.05*   < > 2.08* 1.93* 1.94*   HEMOGLOBIN 7.0*   < > 7.1* 6.5* 6.5*   HEMATOCRIT 22.1*   < > 21.5* 20.6* 20.7*   .8*   < > 103.4* 106.7* 106.7*   MCH 34.1*   < > 34.1* 33.7* 33.5*   RDW 62.7*   < > 59.2* 62.0* 63.8*   PLATELETCT 194   < > 218 215 207   MPV 9.7   < > 9.8 10.0 9.9   NEUTSPOLYS 69.90  --  80.90* 80.50*  --    LYMPHOCYTES 15.70*  --  8.50* 8.10*  --    MONOCYTES 9.80  --  8.50 10.00  --    EOSINOPHILS 3.70  --  1.00 0.30  --    BASOPHILS 0.20  --  0.30 0.20  --     < > = values in this interval not displayed.     Recent Labs     05/24/21  0514 05/25/21  0318 05/26/21  0428   SODIUM 132* 133* 133*   POTASSIUM 5.0 5.0 4.5   CHLORIDE 95* 94* 97   CO2 26 23 27   GLUCOSE  115* 112* 116*   BUN 33* 44* 29*     Recent Labs     05/24/21  0514 05/25/21  0318 05/26/21  0428   ALBUMIN 3.2 3.1* 2.9*   TBILIRUBIN 0.3  --   --    ALKPHOSPHAT 115*  --   --    TOTPROTEIN 6.5  --   --    ALTSGPT 5  --   --    ASTSGOT 7*  --   --    CREATININE 6.45* 8.37* 5.26*       Imaging:  DX-CHEST-PORTABLE (1 VIEW)    Result Date: 5/20/2021 5/20/2021 7:10 AM HISTORY/REASON FOR EXAM:  Chest Pain. TECHNIQUE/EXAM DESCRIPTION AND NUMBER OF VIEWS: Single portable view of the chest. COMPARISON: 5/10/2021 FINDINGS: The cardiomediastinal silhouette is enlarged. Atherosclerotic calcification is seen. There is pleural thickening and calcification at the left lung base. There is patchy left basilar opacity. There is blunting of the left costophrenic angle. No pneumothorax is seen.     Left lower lobe pleural thickening and calcification may be related to sequelae of prior infection or trauma. Patchy left basilar opacity may represent atelectasis or pneumonitis. Mild cardiomegaly. Atherosclerotic plaque.     DX-CHEST-PORTABLE (1 VIEW)    Result Date: 5/10/2021    5/10/2021 9:25 PM HISTORY/REASON FOR EXAM: Shortness of Breath TECHNIQUE/EXAM DESCRIPTION:  Single AP view of the chest. COMPARISON: January 19, 2021 FINDINGS: Overlying cardiac leads are present. Cardiomegaly is observed. The mediastinal contour appears within normal limits.  Bilateral perihilar interstitial prominence and bronchial wall cuffing is noted. The lungs appear well expanded bilaterally.  Hazy left lung base opacity is seen. Blunting of the left costophrenic angle is seen suggesting trace left effusion. The bony structures appear age-appropriate.     1.  Left lung base infiltrate or atelectasis with small left pleural effusion 2.  Cardiomegaly 3.  Perihilar interstitial prominence and bronchial wall cuffing, appearance suggests changes of underlying bronchial inflammation, consider bronchitis.    DX-FOOT-2- LEFT    Result Date: 5/20/2021 5/20/2021  7:35 AM HISTORY/REASON FOR EXAM:  cellulits starting from left 4th toe eval for osteo TECHNIQUE/EXAM DESCRIPTION AND NUMBER OF VIEWS: 2 views of the LEFT foot. COMPARISON:  2020 FINDINGS: Prior partial amputation of great toe at the base of proximal phalanx. Soft tissue swelling of 4th toe. No soft tissue gas. Diffuse vascular calcifications. No focal bony destruction. Calcaneal enthesophytes. Calcification dorsal to calcaneus, possibly within Achilles tendon, chronic.     1.  Soft tissue swelling of LEFT 4th toe. 2.  No gross bony destruction however osteomyelitis is not excluded by plain film. 3.  Prior partial amputation of great toe.    US-PATSY SINGLE LEVEL BILAT    Result Date: 2021   Vascular Laboratory  Conclusions  No flow by PPG 2-5th toes.  severely depressed PATSY dorsalis pedis, normal PATSY posterior tibial on left  side.  KLARISSA BRADSHAW  Age:    75    Gender:     M  MRN:    [id number]  :    1945      BSA:  Exam Date:     2021 10:13  Room #:     Inpatient  Priority:     Routine  Ht (in):             Wt (lb):  Ordering Physician:        GINGER FRAZIER  Referring Physician:       [id number]  Sonographer:               Asha Bowens  Study Type:                Limited Bilateral  Technical Quality:         Fair  Indications:     Type 2 diabetes mellitus with foot ulcer  CPT Codes:       06773  ICD Codes:       E11.621  History:         Right below the knee amputation, left big toe amputated, left                   fourth toe ischemia. Prior exam 20  Limitations:     Right below the knee amputation, left big toe amputated,                   unable to obtain left brachial pressure due to AVF, bandage                   over the right common femoral.                 RIGHT  Waveform            Systolic BPs (mmHg)                             120           Brachial                                            Common Femoral                                           Posterior Tibial                                           Dorsalis Pedis                                           Peroneal                                           PATSY                                           TBI                       LEFT  Waveform        Systolic BPs (mmHg)                                           Brachial  Triphasic                                Common Femoral  Monophasic                 127           Posterior Tibial  Monophasic                 45            Dorsalis Pedis                                           Peroneal                             1.06          PATSY                                           TBI  Findings  Right-  Ankle pressure not obtained due to below the knee amputation.  Bandage over the common femoral artery.  Doppler waveform of the popliteal artery is of high amplitude and  triphasic.  Left-  Ankle-brachial index of the dorsalis pedis artery is severely reduced.  Ankle-brachial index of the posterior tibial artery is normal.  Doppler waveform of the common femoral artery is of high amplitude and  triphasic.  Doppler waveforms at the ankle are monophasic with moderate amplitude.  PPG's-  No flow  Bilateral arterial duplex scan was performed in accordance with lower  extremity arterial evaluation protocol - see separate report.  Mark Rodriguez MD  (Electronically Signed)  Final Date:      21 May 2021 15:25    US-EXTREMITY ARTERY LOWER BILAT    Result Date: 5/21/2021  Lower Extremity  Arterial Duplex Report  Vascular Laboratory  CONCLUSIONS  Left below knee diffuse disease, absent flow in peroneal artery.  KLARISSA BRADSHAW  Exam Date:     05/21/2021 12:09  Room #:     Inpatient  Priority:     Routine  Ht (in):             Wt (lb):  Ordering Physician:        GINGER FRAZIER  Referring Physician:       [id number] Miguel  Sonographer:               Asha Bowens  Study Type:                 Limited Bilateral  Technical Quality:         Adequate  Age:    75    Gender:     M  MRN:    [id number]  :    1945      BSA:  Indications:     Type 2 diabetes mellitus with foot ulcer  CPT Codes:       73257  ICD Codes:       E11.621  History:         Right below the knee amputation, left big toe amputated,                   fourth toe ischemia. Prior exam 20.  Limitations:     Rigth below the knee amputation.                RIGHT  Waveform        Peak Systolic Velocity (cm/s)                  Prox    Prox-Mid  Mid    Mid-Dist  Distal  Triphasic                         52                       CFA  Biphasic        55                                         PFA  Biphasic        44                65      24       56      SFA  Biphasic                          35                       POP  Monophasic      62                                         AT  Monophasic      51                                         PT  Not                                                        LISA  attained                LEFT  Waveform        Peak Systolic Velocity (cm/s)                  Prox    Prox-Mid  Mid    Mid-Dist  Distal  Triphasic                         132                      CFA  Biphasic        43                                         PFA  Biphasic        50                62               42      SFA  Triphasic                         56                       POP  Monophasic      48                                 70      AT  Monophasic      37                                 30      PT  Absent          0                                  54      LISA  FINDINGS  Right-  Calcific atherosclerotic plaque seen throughout the lower extremity.  Stenosis of the distal superficial femoral artery. Velocities are  consistent with 50-75% stenosis.  Proximal anterior and posterior tibial artery waveforms are monophasic.  Proximal peroneal artery was not visualized.  Left-  Calcific atherosclerotic plaque seen  throughout the lower extremity.  Monophasic waveforms seen in the anterior and posterior tibial arteries.  Absent flow seen in the proximal peroneal artery.  Mark Rodriguez MD  (Electronically Signed)  Final Date:      21 May 2021 14:42    US-HEMODIALYSIS GRAFT DUPLEX COMP UPPER EXTREMITY    Result Date: 2021   Hemodialysis Access Report  Vascular Laboratory  Conclusions  1) Patent left brachial, radial, and ulnar arteries with dense calcific  plaque throughout.  2) Patent DRIL bypass graft.  Flow in the distal left radial artery is  antegrade and monophasic to biphasic with good waveforms.  Flow in the  distal ulnar artery is attenuated.  3) Patent left brachiocephalic fistula with area of narrowing seen in the  distal bicep area.  Flow volume is 499ml/min in the mid biceps.  KLARISSA BRADSHAW  Exam Date:     2021 12:46  Room #:     Inpatient  Priority:     Stat  Ht (in):             Wt (lb):  Ordering Physician:        MOHIT LUTZ  Referring Physician:       BOLIVAR Trejo  Sonographer:               Patricia Wheatley RVSABRINA  Study Type:                Complete Unilateral  Technical Quality:         Adequate  Age:    75    Gender:     M  MRN:    [id number]  :    1945      BSA:  Indications:     End stage renal disease  CPT Codes:       86550  ICD Codes:       N18.6  History:         Left brachiocephalic fistula with DRIL procedure. Prior study                   done 1/3/21.  Limitations:  Exam Data:  Side:          Left  Access          Fistula  Inflow Artery   Brachial  Outflow Vein    Cephalic                     Velocity (cm/s)   Prox Inflow artery          133.0                               0   Mid Inflow artery           165.0                               0   Distal Inflow artery        141.0                               0   Inflow Anastomosis          301.0                               0   Inflow Artery distal to     28.00   anastomosis          Antegrade   Proximal Graft   Mid  Graft   Distal Graft   Outflow Anastomosis   Proximal Outflow Vein       286.0                               0   Mid Outflow Vein            135.0                               0   Distal Outflow Vein         77.00   Flow Volume Mid Bicep       499.0ml/min                               0   Flow Volume Mid-Forearm          ml/min   Prox Outflow Vein Diam      0.59  cm   Mid Outflow Vein Diam       0.73  cm   Distal Outflow Vein Diam    0.63  cm  Findings  Dense calcific plaque is seen in the brachial, radial and ulnar arteries.  DRIL Bypass graft is patent without evidence of stenosis.  Velocities in the DRIL bypass are as follows (cm/s): Proximal to prox  anastamosis: 204 Prox anastamosis: 172 Prox: 53 Mid: 25 Distal: 21  Distal  anastamosis: 20  Distal to distal anastamosis: 28.  The radial and ulnar arteries are patent distally.  The cephalic vein is patent. Elevated velocity and diameter reduction is  seen in the cephalic vein at distal bicep. Velocity increases from 135 cm/s  to 400 cm/s. Diameter size decreases from 0.74 cm to 0.41 cm.  Flow volume is listed above along with other velocities and diameter  measurments through the bicep.  Johnathan Honeycutt M.D.  (Electronically Signed)  Final Date:      13 May 2021 08:05    EC-ECHOCARDIOGRAM COMPLETE W/O CONT    Result Date: 5/12/2021  Transthoracic Echo Report Echocardiography Laboratory CONCLUSIONS Compared to the images of the study done 11/14/2020 - there is now reduced ejection fraction with atrial fibrillation. Moderately reduced left ventricular systolic function. Left ventricular ejection fraction is visually estimated to be 30%. Global hypokinesis. Diastolic function is difficult to assess with atrial fibrillation. The right ventricle was normal in size and function. No significant valve disease or flow abnormalities. These findings communicated to the ordering physician at the time of the exam. KLARISSA BRADSHAW Exam Date:         05/12/2021                     10:10 Exam Location:     Inpatient Priority:          Routine Ordering Physician:        NELSON CHILDERS                             ([id number]) Referring Physician:       [id number] Sonographer:               Mahamed RHODES Age:    75     Gender:    M MRN:    [id number] :    1945 BSA:    1.87   Ht (in):    66     Wt (lb):    170 Exam Type:     Complete Indications:     Shortness of breath ICD Codes:       R06.02 CPT Codes:       47719 BP:   125    /   84     HR:   100 Technical Quality:       Fair MEASUREMENTS  (Male / Female) Normal Values 2D ECHO LV Diastolic Diameter PLAX        5.7 cm                4.2 - 5.9 / 3.9 - 5.3 cm LV Systolic Diameter PLAX         4.4 cm                2.1 - 4.0 cm IVS Diastolic Thickness           0.76 cm               LVPW Diastolic Thickness          0.89 cm               LVOT Diameter                     2.5 cm                Estimated LV Ejection Fraction    30 %                  LV Ejection Fraction MOD BP       35.6 %                >= 55  % LV Ejection Fraction MOD 4C       36 %                  LV Ejection Fraction MOD 2C       34.8 %                IVC Diameter                      2.1 cm                DOPPLER AV Peak Velocity                  1.1 m/s               AV Peak Gradient                  5.1 mmHg              AV Mean Gradient                  3.4 mmHg              LVOT Peak Velocity                0.74 m/s              AV Area Cont Eq vti               2.8 cm2               TR Peak Velocity                  221 cm/s              PV Peak Velocity                  0.63 m/s              PV Peak Gradient                  1.6 mmHg              RVOT Peak Velocity                0.51 m/s              * Indicates values subject to auto-interpretation LV EF:  30    % FINDINGS Left Ventricle Normal left ventricular chamber size. Normal left ventricular wall thickness. Moderately reduced left ventricular systolic function.  "Left ventricular ejection fraction is visually estimated to be 30%. Global hypokinesis. Diastolic function is difficult to assess with atrial fibrillation. Right Ventricle The right ventricle was normal in size and function. Right Atrium The right atrium is normal in size.  Dilated inferior vena cava without inspiratory collapse. Left Atrium Moderately dilated left atrium. Left atrial volume index is 46 mL/sq m. Mitral Valve Structurally normal mitral valve without significant stenosis. Trace mitral regurgitation. Aortic Valve Tricuspid aortic valve. No stenosis or regurgitation seen. Tricuspid Valve Structurally normal tricuspid valve without significant stenosis. Trace tricuspid regurgitation. Unable to estimate pulmonary artery pressure due to an inadequate tricuspid regurgitant jet. Pulmonic Valve The pulmonic valve is not well visualized. No pulmonic stenosis. Trace pulmonic insufficiency. Pericardium Normal pericardium without effusion. Aorta The aortic root is normal.  Ascending aorta diameter is 3.4 cm. Tito Lucas MD (Electronically Signed) Final Date:     12 May 2021 12:03      Micro:  Results     Procedure Component Value Units Date/Time    Blood Culture [[id number]]     Order Status: No result Specimen: Blood from Peripheral     Blood Culture [[id number]]     Order Status: No result Specimen: Blood from Peripheral     BLOOD CULTURE [[id number]] Collected: 05/20/21 0740    Order Status: Completed Specimen: Blood from Peripheral Updated: 05/25/21 0900     Significant Indicator NEG     Source BLD     Site PERIPHERAL     Culture Result No growth after 5 days of incubation.    Narrative:      Per Hospital Policy: Only change Specimen Src: to \"Line\" if  specified by physician order.  No site indicated    BLOOD CULTURE [[id number]] Collected: 05/20/21 0748    Order Status: Completed Specimen: Blood from Peripheral Updated: 05/25/21 0900     Significant Indicator NEG     Source BLD     Site PERIPHERAL     " "Culture Result No growth after 5 days of incubation.    Narrative:      Per Hospital Policy: Only change Specimen Src: to \"Line\" if  specified by physician order.  No site indicated    CULTURE WOUND W/ GRAM STAIN [[id number]]  (Abnormal)  (Susceptibility) Collected: 05/20/21 0759    Order Status: Completed Specimen: Wound from Left Foot Updated: 05/24/21 0710     Significant Indicator POS     Source WND     Site LEFT FOOT     Culture Result Light growth usual skin xander.     Gram Stain Result Rare WBCs.  Many Gram positive cocci.  Few Gram negative rods.       Culture Result Klebsiella pneumoniae ESBL  Moderate growth  Extended Spectrum Beta-lactamase (ESBL) isolated.  ESBL's may be clinically resistant to therapy with  Penicillins,Cephalosporins or Aztreonam despite  apparent in vitro susceptibility to some of these agents.  The patient requires contact isolation.  Please contact pharmacy or an Infectious Disease Specialist  if you have any questions about appropriate therapy.        Proteus mirabilis  Light growth        Staphylococcus aureus  Moderate growth  This isolate is presumed to be clindamycin resistant based on  detection of inducible resistance.  Clindamycin may still  be effective in some patients.      Narrative:      CALL  [facility]  131 tel. [phone],  CALLED  131 tel. [phone] 05/22/2021, 09:32, RB PERF. RESULTS CALLED TO: RN  [id number]    Susceptibility     Klebsiella pneumoniae esbl (1)     Antibiotic Interpretation Microscan Method Status    Ceftriaxone Extended Spectrum Beta Lactamase >32 mcg/mL BINU Final    Cefazolin Resistant >16 mcg/mL BINU Final    Ciprofloxacin Resistant >2 mcg/mL BINU Final    Cefepime Resistant >16 mcg/mL BINU Final    Cefuroxime Resistant >16 mcg/mL BINU Final    Ertapenem Sensitive <=0.5 mcg/mL BINU Final    Gentamicin Sensitive <=2 mcg/mL BINU Final    Moxifloxacin Intermediate 4 mcg/mL BINU Final    Ampicillin/sulbactam Intermediate 16/8 mcg/mL BINU Final    Pip/Tazobactam " Sensitive <=8 mcg/mL BINU Final    Trimeth/Sulfa Resistant >2/38 mcg/mL BINU Final    Tigecycline Sensitive <=2 mcg/mL BINU Final    Tobramycin Sensitive <=2 mcg/mL BINU Final          Proteus mirabilis (2)     Antibiotic Interpretation Microscan Method Status    Ceftriaxone Sensitive <=1 mcg/mL BINU Final    Cefazolin Sensitive <=2 mcg/mL BINU Final    Ciprofloxacin Sensitive <=0.25 mcg/mL BINU Final    Cefepime Sensitive <=2 mcg/mL BINU Final    Cefuroxime Sensitive <=4 mcg/mL BINU Final    Ertapenem Sensitive <=0.5 mcg/mL BINU Final    Gentamicin Sensitive <=2 mcg/mL BINU Final    Moxifloxacin Sensitive <=2 mcg/mL BINU Final    Pip/Tazobactam Sensitive <=8 mcg/mL BINU Final    Trimeth/Sulfa Sensitive <=0.5/9.5 mcg/mL BINU Final    Tobramycin Sensitive <=2 mcg/mL BINU Final    Ampicillin Sensitive <=8 mcg/mL BINU Final          Staphylococcus aureus (3)     Antibiotic Interpretation Microscan Method Status    Cefazolin Sensitive <=8 mcg/mL BINU Final    Cefepime Sensitive <=4 mcg/mL BINU Final    Ampicillin/sulbactam Sensitive <=8/4 mcg/mL BINU Final    Pip/Tazobactam Sensitive <=8 mcg/mL BINU Final    Vancomycin Sensitive 1 mcg/mL BINU Final    Trimeth/Sulfa Sensitive <=0.5/9.5 mcg/mL BINU Final    Tetracycline Sensitive <=4 mcg/mL BINU Final    Azithromycin Resistant >4 mcg/mL BINU Final    Clindamycin Resistant <=0.25 mcg/mL BINU Final    Ceftaroline Sensitive <=0.5 mcg/mL BINU Final    Daptomycin Sensitive <=0.5 mcg/mL BINU Final    Erythromycin Resistant >4 mcg/mL BINU Final    Oxacillin Sensitive 0.5 mcg/mL BINU Final            Condensed View                   GRAM STAIN [[id number]] Collected: 05/20/21 0759    Order Status: Completed Specimen: Wound Updated: 05/23/21 0719     Significant Indicator .     Source WND     Site LEFT FOOT     Gram Stain Result Rare WBCs.  Many Gram positive cocci.  Few Gram negative rods.      Narrative:      CALL  [facility]  131 tel. [phone],  CALLED  131 tel. [phone] 05/22/2021, 09:32, RB PERF. RESULTS  "CALLED TO: RN  [id number]    SARS-CoV-2 PCR (24 hour In-House): Collect NP swab in [facility] [[id number]] Collected: 05/20/21 0956    Order Status: Completed Specimen: Respirate Updated: 05/20/21 1259     SARS-CoV-2 Source NP Swab     SARS-CoV-2 by PCR NotDetected     Comment: PATIENTS: Important information regarding your results and instructions can  be found at [email or web address]   \"After your  Covid-19 Test\"    [facility] providers: PLEASE REFER TO DE-ESCALATION AND RETESTING PROTOCOL  on [email or web address]    **The TaqPath COVID-19 SARS-CoV-2 RT-PCR test has been made available for  use under the Emergency Use Authorization (EUA) only.         Narrative:      Have you been in close contact with a person who is suspected  or known to be positive for COVID-19 within the last 30 days  (e.g. last seen that person < 30 days ago)->No          Assessment:  Active Hospital Problems    Diagnosis    • *Cellulitis of left foot [L03.116]    • Ischemia of toe [I99.8]    • DNR (do not resuscitate) [Z66]    • Advance care planning [Z71.89]    • Status post below-knee amputation of right lower extremity ([facility]) [Z89.511]    • Cardiomyopathy, ischemic [I25.5]    • Peripheral vascular disease ([facility]) [I73.9]    • Hypotension [I95.9]    • Paroxysmal A-fib ([facility]) [I48.0]    • End stage renal disease on dialysis ([facility]) [N18.6, Z99.2]    • Type 2 diabetes mellitus with kidney complication, with long-term current use of insulin ([facility]) [E11.29, Z79.4]    • Macrocytic anemia [D53.9]    • CAD (coronary artery disease) [I25.10]    • Hyponatremia [E87.1]      Necrotic left fourth and fifth toe  Ischemic forefoot  Diabetes  ESRD on HD  History of left great toe osteomyelitis-s/p amputation  Status post right BKA  CAD  Ischemic cardiomyopathy  Severe PVD     Plan:   Due to clinical worsening switch to meropenem for now  Vascular surgery consulted NO intervention feasible  Plan is now for left BKA  Adjust dose abx for renal " function  Keep BS under 150 to help control current infection      DENILSON MONTOYA

## 2021-05-26 NOTE — PROGRESS NOTES
0600 Morning lab results show pt's Hgb is 6.5. Paged hospitalist to update, received order for 1 unit PRBCs. Ordered stat COD.    0645 Received call from blood bank, pt has multiple antibodies so the type and crossmatch will likely take several hours to complete

## 2021-05-26 NOTE — CARE PLAN
The patient is Watcher - Medium risk of patient condition declining or worsening    Shift Goals  Clinical Goals: Increase BP, hemoglobin level   Patient Goals: Increase BP, Pain control    Progress made toward(s) clinical / shift goals:  Patient receive blood transfusion today, CBC lab is being drawn every 8 hours.     Patient is not progressing towards the following goals:  Problem: Knowledge Deficit - Standard  Goal: Patient and family/care givers will demonstrate understanding of plan of care, disease process/condition, diagnostic tests and medications  Outcome: Progressing   Educate patient regarding blood transfusion, why he needs blood, and monitor side effects.

## 2021-05-26 NOTE — CONSULTS
5/26/2021      Reason for consultation: Left foot fourth toe necrosis, peripheral arterial disease    Consultation on Luis Sepulveda at the request of the limb preservation service for evaluation for below-knee amputation.  The patient is a 75 y.o. male who presents with a several week history of left fourth toe wound and necrosis.  He has a history of right below-knee amputation as well as prior history of left great toe amputation.  He has a known history of peripheral arterial disease, heart failure, low ejection fraction, end-stage renal disease on dialysis.  He states he does not remember when the wound necessarily started but that it is progressively getting worse.  He does have a deep and achy pain in his foot which is worse with ambulation and better with rest and immobilization.  He was evaluated by vascular surgery and found to not be a candidate for revascularization or reconstruction and henceforth below-knee amputation was recommended as he would not be able to heal a wound below this level.        Past Medical History:   Diagnosis Date   • Arrhythmia     hx a fib   • Arthritis 12/29/2020    knees, ankles, back   • CAD (coronary artery disease)     stents   • Chronic anticoagulation 3/26/2020   • Chronic kidney disease (CKD), stage V (HCC)    • Congestive heart failure (HCC)     hx of   • Dental disorder 12/29/2020    partial upper   • Diabetes    • Hemodialysis patient (HCC)     Tuesday, Thursday, Saturday   • Hypertension    • Kidney failure    • Myocardial infarct (HCC)     ? 2019    • Osteoarthritis    • Peripheral neuropathy    • Pneumonia     per hx   • Sleep apnea     does not use cpap anymore       Past Surgical History:   Procedure Laterality Date   • KNEE AMPUTATION BELOW Right 12/31/2020    Procedure: AMPUTATION, BELOW KNEE ...;  Surgeon: Leobardo Lynn M.D.;  Location: SURGERY [facility];  Service: Orthopedics   • TOE AMPUTATION Right 11/16/2020    Procedure: AMPUTATION, TOE  GREAT;  Surgeon: Leobardo Lynn M.D.;  Location: SURGERY [facility];  Service: Orthopedics   • TOE AMPUTATION Left 5/17/2020    Procedure: AMPUTATION, TOE GREAT;  Surgeon: Leobardo Lynn M.D.;  Location: SURGERY [facility];  Service: Orthopedics   • TENOTOMY Left 5/17/2020    Procedure: FLEXOR TENOTOMIES, TWO-FIVE TOES;  Surgeon: Leobardo Lynn M.D.;  Location: SURGERY [facility];  Service: Orthopedics   • APPENDECTOMY     • OTHER CARDIAC SURGERY      stents x1   • OTHER ORTHOPEDIC SURGERY      knee surgery       Medications  No current facility-administered medications on file prior to encounter.     Current Outpatient Medications on File Prior to Encounter   Medication Sig Dispense Refill   • insulin glargine (LANTUS SOLOSTAR) 100 UNIT/ML Solution Pen-injector injection Inject 8 Units under the skin at bedtime for 30 days. 3 mL 0   • apixaban (ELIQUIS) 5mg Tab Take 1 tablet by mouth 2 times a day. Indications: Thromboembolism secondary to Atrial Fibrillation 60 tablet 0   • omeprazole (PRILOSEC) 20 MG delayed-release capsule Take 1 capsule by mouth 2 times a day. 30 capsule 0   • traZODone (DESYREL) 50 MG Tab Take 1 tablet by mouth at bedtime. 30 tablet 0   • atorvastatin (LIPITOR) 40 MG Tab Take 1 tablet by mouth at bedtime. 30 tablet 0   • calcitRIOL (ROCALTROL) 0.25 MCG Cap Take 1 capsule by mouth every day. 30 capsule 0   • gabapentin (NEURONTIN) 300 MG Cap Take 1 capsule by mouth every day. 30 capsule 0   • methimazole (TAPAZOLE) 5 MG Tab Take 1 tablet by mouth 2 times a day. 60 tablet 0   • tamsulosin (FLOMAX) 0.4 MG capsule Take 2 Capsules by mouth at bedtime. 30 capsule 0   • sevelamer carbonate (RENVELA) 800 MG Tab tablet Take 2 Tablets by mouth 3 times a day with meals. 90 tablet 0   • midodrine (PROAMATINE) 10 MG tablet Take 1 tablet by mouth 3 times a day with meals. 90 tablet 0   • clopidogrel (PLAVIX) 75 MG Tab Take 1 tablet by mouth every day. 30 tablet 0   • carvedilol (COREG)  3.125 MG Tab Take 1 tablet by mouth 2 times a day with meals. Hold if blood pressure <100 and heart rate <60 60 tablet 0       Allergies  Mircera [methoxy polyethylene glycol-epoetin beta] and Other drug    ROS  General Constitutional: Patient reports no changes in general health lately other than stated in HPI  Eyes: Patient reports abnormalities  ENT: Patient reports balance problems with ambulation  Cardiovascular: Patient currently denies chest pain  Respiratory: Patient currently reports mild shortness of breath   Gastrointestinal: Patient currently denies nausea/vomiting  Integumentary: no new skin lesions/rashes other than stated in HPI  Psychiatric: Patient denies h/o psychiatric conditions/mood changes  Hematologic / Lymphatic: Patient denies bleeding or bruising  Allergic / Immunologic: Patient denies recent immunologic problems    Family History   Problem Relation Age of Onset   • Heart Disease Mother    • Alcohol/Drug Father    • Thyroid Son    • Diabetes Brother    • Hypertension Neg Hx    • Hyperlipidemia Neg Hx        Social History     Socioeconomic History   • Marital status:      Spouse name: Not on file   • Number of children: Not on file   • Years of education: Not on file   • Highest education level: Some college, no degree   Occupational History   • Not on file   Tobacco Use   • Smoking status: Former Smoker     Packs/day: 1.00     Years: 55.00     Pack years: 55.00     Types: Cigarettes     Quit date: 2014     Years since quittin.3   • Smokeless tobacco: Never Used   Vaping Use   • Vaping Use: Never used   Substance and Sexual Activity   • Alcohol use: Not Currently   • Drug use: No   • Sexual activity: Not on file   Other Topics Concern   • Not on file   Social History Narrative   • Not on file     Social Determinants of Health     Financial Resource Strain:    • Difficulty of Paying Living Expenses:    Food Insecurity:    • Worried About Running Out of Food in the Last Year:  "   • Ran Out of Food in the Last Year:    Transportation Needs: Unmet Transportation Needs   • Lack of Transportation (Medical): Yes   • Lack of Transportation (Non-Medical): Yes   Physical Activity: Inactive   • Days of Exercise per Week: 0 days   • Minutes of Exercise per Session: 0 min   Stress: Stress Concern Present   • Feeling of Stress : To some extent   Social Connections: Socially Isolated   • Frequency of Communication with Friends and Family: More than three times a week   • Frequency of Social Gatherings with Friends and Family: More than three times a week   • Attends Baptism Services: Never   • Active Member of Clubs or Organizations: No   • Attends Club or Organization Meetings: Never   • Marital Status:    Intimate Partner Violence:    • Fear of Current or Ex-Partner:    • Emotionally Abused:    • Physically Abused:    • Sexually Abused:        Physical Exam  Vitals  BP (!) 98/38   Pulse 80   Temp 36.9 °C (98.5 °F) (Temporal)   Resp 15   Ht 1.676 m (5' 6\")   Wt 75.8 kg (167 lb)   SpO2 99%   General: Well Developed, Well Nourished, no acute distress  Psychiatric: Alert and oriented, appropriate responses to questions, he is perseverating on blood being available  HEENT: Normocephalic, atraumatic  Eyes: Anicteric, EOMI  Neck: Supple, trachea midline  Chest: Symmetric expansion of the chest wall  Heart: RRR, nonpalpable left foot pulses  Abdomen: Soft, NT, ND  Skin:  left fourth toe with necrosis and ischemia.  There is no active drainage  Musculoskeletal: Skin examination is as above.  Left foot without significant pain in the ankle or foot.  There is a prior great toe amputation.  Stability grossly intact.  There is right lower extremity below-knee amputation that is healed  Neuro: Decreased sensation throughout left foot  Vascular: Nonpalpable left foot pulses    Radiographs:  US-EXTREMITY ARTERY LOWER BILAT   Final Result      US-PATSY SINGLE LEVEL BILAT   Final Result      DX-FOOT-2- " LEFT   Final Result      1.  Soft tissue swelling of LEFT 4th toe.   2.  No gross bony destruction however osteomyelitis is not excluded by plain film.   3.  Prior partial amputation of great toe.      DX-CHEST-PORTABLE (1 VIEW)   Final Result      Left lower lobe pleural thickening and calcification may be related to sequelae of prior infection or trauma.      Patchy left basilar opacity may represent atelectasis or pneumonitis.      Mild cardiomegaly.      Atherosclerotic plaque.             Laboratory Values  Recent Labs     05/25/21 0318 05/26/21  0428 05/26/21  0756   WBC 11.6* 12.3* 13.5*   RBC 2.08* 1.93* 1.94*   HEMOGLOBIN 7.1* 6.5* 6.5*   HEMATOCRIT 21.5* 20.6* 20.7*   .4* 106.7* 106.7*   MCH 34.1* 33.7* 33.5*   MCHC 33.0* 31.6* 31.4*   RDW 59.2* 62.0* 63.8*   PLATELETCT 218 215 207   MPV 9.8 10.0 9.9     Recent Labs     05/24/21  0514 05/25/21 0318 05/26/21  0428   SODIUM 132* 133* 133*   POTASSIUM 5.0 5.0 4.5   CHLORIDE 95* 94* 97   CO2 26 23 27   GLUCOSE 115* 112* 116*   BUN 33* 44* 29*     Recent Labs     05/25/21  0944   INR 1.28*         Impression:    #1  Peripheral arterial disease  #2  Diabetes mellitus  #3  End-stage renal disease on dialysis  #4  Anemia  #5  Left fourth toe ischemia    Plan:  I had a lengthy discussion with the patient as well as nursing regarding the nature of my clinical findings.  We discussed below-knee amputation of which the patient is okay with proceeding with a, however he is concerned about the possibility of blood being available.  I did discuss with nursing who has talked with the blood bank who does not have blood available currently.  Hemoglobin is at a level of 6.5 and this is a nonurgent below-knee amputation given his overall clinical picture.  We discussed the fact that he would be unlikely to ambulate postsurgically after below-knee amputation he would most likely be bed to wheelchair transfer for the remainder of his life.  I would like him to discuss  this with his family.  I have attempted to contact his contact information on file but of been unsuccessful in doing so.  I recommend blood transfusion once blood with proper antibodies becomes available.  Below-knee amputation may be postponed until a date when safe to do so as he is at high risk for complication both cardiac and otherwise following surgery such as below-knee amputation.  Okay for diet currently  We will coordinate operative intervention once he is stabilized and once blood is available.      Jarett Márquez MD

## 2021-05-26 NOTE — CARE PLAN
Problem: Knowledge Deficit - Standard  Goal: Patient and family/care givers will demonstrate understanding of plan of care, disease process/condition, diagnostic tests and medications  Outcome: Progressing     Problem: Fall Risk  Goal: Patient will remain free from falls  Outcome: Progressing     Problem: Pain - Standard  Goal: Alleviation of pain or a reduction in pain to the patient’s comfort goal  Outcome: Progressing     Problem: Skin Integrity  Goal: Skin integrity is maintained or improved  Outcome: Progressing   The patient is Watcher - Medium risk of patient condition declining or worsening    Shift Goals  Clinical Goals: Pain control  Patient Goals: Increase BP, Pain control    Progress made toward(s) clinical / shift goals:  Bed alarm on,     Patient is not progressing towards the following goals:

## 2021-05-26 NOTE — PROGRESS NOTES
Gave pre-op report, received verbal confirmation from pre-op RN that clear liquids until 1200 is acceptable since pt is diabetic. Placed order for diet per RN Pre-Procedure Protocol order set.

## 2021-05-26 NOTE — PROGRESS NOTES
Hospital Medicine Daily Progress Note    Date of Service  5/26/2021    Chief Complaint  75 y.o. male admitted 5/20/2021 with weakness and dizziness    Hospital Course  75 y.o. male who presented 5/20/2021 with left foot pain  Mr. Sepulveda has a past medical history of end-stage renal disease on dialysis, peripheral vascular disease status post right below the knee amputation, insulin-dependent diabetes mellitus, there was most recently admitted here from 5/10/2021 through 5/18/2021 with shortness of breath.  During that hospitalization he underwent heart catheterization revealing ejection fraction of 15 to 20% with 80% ostial LAD in-stent restenosis and distal RCA disease.  He presents back today with 4 to 5 days of left foot pain especially when walking with his walker.  He has been compliant with dialysis and went on Tuesday.  He was admitted for cellulitis of the left foot with what appears to be an ischemic left fourth toe.  He has been on Eliquis which was be held in case he requires amputation.  Upon admission, he was seen by LPS.  ID saw him as well and DC'd his antibiotics as he was not septic.  Palliative care saw him and patient does want surgery for now.  Vascular is evaluating him to see if he is a candidate for surgery.      5/21: PATSY done and arterial ultrasound pending.  Patient feels okay with no fevers or chills so far.  5/22: Find of some loose stools.  Culture started growing ESBL.  Consulted ID Dr. Love.  5/23: Hgb did decrease to 7.1 but stable at 7.5 on repeat.  I have asked and heparin were appropriately held overnight, now resuming.   5/24: Seen by palliative.  Patient does want surgery for now.  Updated vascular.    Interval Problem Update  Patient evaluated bedside and reporting left foot pain  BP in the low range today  Hb 6.5 overnight, 1U PRBC   WBC trending up slowly  Wound culture growing Klebsiella pneumonia ESBL, but use me diabetes, and Staph aureus   Switch to meropenem as per  infectious disease recommendations  Left BKA postpone as patient at high risk for complication  Frequent H&H  IV bolus  Labs in a.m.    Disposition  Left BKA    Review of Systems  Review of Systems   Constitutional: Negative for fever.   Respiratory: Negative for cough, shortness of breath and wheezing.    Cardiovascular: Negative for chest pain.   Gastrointestinal: Positive for diarrhea (loose Stools). Negative for abdominal pain, constipation, nausea and vomiting.   Genitourinary: Negative for dysuria.   Musculoskeletal: Positive for joint pain.   Neurological: Negative for headaches.   Psychiatric/Behavioral: The patient is nervous/anxious.         Physical Exam  Temp:  [36.3 °C (97.3 °F)-37.7 °C (99.8 °F)] 37.2 °C (99 °F)  Pulse:  [66-85] 81  Resp:  [14-18] 14  BP: ()/(30-65) 95/38  SpO2:  [94 %-99 %] 96 %    Physical Exam  Constitutional:       General: He is not in acute distress.     Appearance: He is well-developed. He is not diaphoretic.   HENT:      Head: Normocephalic and atraumatic.      Right Ear: External ear normal.      Left Ear: External ear normal.      Mouth/Throat:      Pharynx: No oropharyngeal exudate or posterior oropharyngeal erythema.   Eyes:      Extraocular Movements: Extraocular movements intact.   Cardiovascular:      Rate and Rhythm: Normal rate.      Heart sounds: No gallop.    Pulmonary:      Effort: No respiratory distress.      Breath sounds: No wheezing.   Abdominal:      General: There is no distension.      Tenderness: There is no abdominal tenderness.   Musculoskeletal:         General: Tenderness present.      Comments: Right BKA  Left fourth toe with necrosis and tenderness to palpation   Skin:     General: Skin is warm.   Neurological:      Mental Status: He is alert and oriented to person, place, and time. Mental status is at baseline.      Motor: No weakness.   Psychiatric:         Mood and Affect: Mood normal.         Behavior: Behavior normal.          Fluids    Intake/Output Summary (Last 24 hours) at 5/26/2021 1532  Last data filed at 5/26/2021 1400  Gross per 24 hour   Intake 1222 ml   Output 40 ml   Net 1182 ml       Laboratory  Recent Labs     05/25/21 0318 05/26/21  0428 05/26/21  0756   WBC 11.6* 12.3* 13.5*   RBC 2.08* 1.93* 1.94*   HEMOGLOBIN 7.1* 6.5* 6.5*   HEMATOCRIT 21.5* 20.6* 20.7*   .4* 106.7* 106.7*   MCH 34.1* 33.7* 33.5*   MCHC 33.0* 31.6* 31.4*   RDW 59.2* 62.0* 63.8*   PLATELETCT 218 215 207   MPV 9.8 10.0 9.9     Recent Labs     05/24/21  0514 05/25/21 0318 05/26/21  0428   SODIUM 132* 133* 133*   POTASSIUM 5.0 5.0 4.5   CHLORIDE 95* 94* 97   CO2 26 23 27   GLUCOSE 115* 112* 116*   BUN 33* 44* 29*   CREATININE 6.45* 8.37* 5.26*   CALCIUM 9.2 9.2 8.7     Recent Labs     05/25/21  0944   INR 1.28*               Imaging  US-EXTREMITY ARTERY LOWER BILAT   Final Result      US-PATSY SINGLE LEVEL BILAT   Final Result      DX-FOOT-2- LEFT   Final Result      1.  Soft tissue swelling of LEFT 4th toe.   2.  No gross bony destruction however osteomyelitis is not excluded by plain film.   3.  Prior partial amputation of great toe.      DX-CHEST-PORTABLE (1 VIEW)   Final Result      Left lower lobe pleural thickening and calcification may be related to sequelae of prior infection or trauma.      Patchy left basilar opacity may represent atelectasis or pneumonitis.      Mild cardiomegaly.      Atherosclerotic plaque.              Assessment/Plan  * Cellulitis of left foot- (present on admission)  Assessment & Plan  One dose of vancomycin given in the ER  On 5/22, foot wound culture grew ESBL  Wound culture growing Klebsiella pneumonia ESBL, but use me diabetes, and Staph aureus   Switch to meropenem antibiotic regimen as per infectious disease recommendation  Left BKA held as patient high risk    Macrocytic anemia- (present on admission)  Assessment & Plan  Hemoglobin 6.5 today  No signs of GI bleed  Vit B12/folate unremarkable  Secondary  to anemia of chronic kidney disease  Transfuse 1 unit of PRBC  Nephrology following, recommending iron repletion  Continue erythropoietin as per nephrology  Pending FOBT  Frequent H&H    DNR (do not resuscitate)- (present on admission)  Assessment & Plan  Discussed with Mr. Sepulveda and he confirmed this status.    Ischemia of toe- (present on admission)  Assessment & Plan  Left 4th toe ischemia  Limb preservation service consulted  He likely will need amputation  Hold Eliquis for possible surgery.  PATSY abnormal.  Ultrasound severe arterial occlusion  Vascular surgery consulted by LPS  No intervention as per vascular surgery team  Surgery recommending to hold off on left BKA for now, patient high risk    Advance care planning- (present on admission)  Assessment & Plan  In a previous visit, he had indicated to palliative that he would consider hospice/comfort care  Currently, vascular recommend surgery and patient is agreeable  Consulted palliative care for further goals of care discussion after surgery    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)- (present on admission)  Assessment & Plan  Right BKA and left great toe amputation    Cardiomyopathy, ischemic- (present on admission)  Assessment & Plan  EF from heart cath 5/10 15-20%    Peripheral vascular disease (HCC)- (present on admission)  Assessment & Plan  Hx of multiple amputations     Hypotension- (present on admission)  Assessment & Plan  Chronic condition  Discontinue coreg  Continue home midodrine  IV bolus  Concerning for sepsis versus cardiogenic shock    Paroxysmal A-fib (HCC)- (present on admission)  Assessment & Plan  Chronic condition  Hold Eliquis in case he needs surgery and utilize SQ heparin for DVT prophylaxis    End stage renal disease on dialysis (HCC)- (present on admission)  Assessment & Plan  Dialysis via left arm fistula T,Th,S  Dr. Hodges, nephrology was consulted from the ER    Type 2 diabetes mellitus with kidney  complication, with long-term current use of insulin (HCC)- (present on admission)  Assessment & Plan  With hyperglycemia  Continue Lantus and sliding scale  Diabetic diet    CAD (coronary artery disease)- (present on admission)  Assessment & Plan  With hx of stents followed by [facility]    Hyponatremia- (present on admission)  Assessment & Plan  Mild, continue to monitor       VTE prophylaxis: SCDs, or in a.m.

## 2021-05-26 NOTE — TELEPHONE ENCOUNTER
"Received surgical clearance from [facility] for a Fistulagram with possible angioplasty with Dr. Stein scheduled on 06/09/21.  P: [phone]  F: [phone]    Upon chart review patient is in the hospital.  Called Nephrology office. Spoke to tech and advised patient is in the hospital and we are not able to clear him and she stated \"that's fine we can just send another one closer to his procedure date\" and I stated we would have to see him in order to clear him and she stated \"can you just write that on the fax\".  Advised I would and send it back right now.      Faxed clearance back to office.  Received confirmation fax.   Scanned into [facility]   "

## 2021-05-26 NOTE — CARE PLAN
The patient is Watcher - Medium risk of patient condition declining or worsening    Shift Goals  Clinical Goals: Increase BP, pain control  Patient Goals: Increase BP, Pain control    Progress made toward(s) clinical / shift goals:  Pt reports pain under control. Blood glucose is in ideal range, no sliding scale insulin required.    Problem: Knowledge Deficit - Standard  Goal: Patient and family/care givers will demonstrate understanding of plan of care, disease process/condition, diagnostic tests and medications  Outcome: Progressing     Problem: Pain - Standard  Goal: Alleviation of pain or a reduction in pain to the patient’s comfort goal  Outcome: Progressing     Problem: Skin Integrity  Goal: Skin integrity is maintained or improved  Outcome: Progressing     Problem: Diabetes Management  Goal: Patient will achieve and maintain glucose in satisfactory range  Outcome: Progressing       Patient is not progressing towards the following goals: Still requires oxygen, desats easily at night. BP still low but pt remains asymptomatic    Problem: Respiratory  Goal: Patient will achieve/maintain optimum respiratory ventilation and gas exchange  Outcome: Not Progressing

## 2021-05-27 NOTE — PROGRESS NOTES
2 RN Skin Check    2 RN skin check complete with AJN Magallon    Devices in place: Nasal Cannula.  Skin assessed under devices: yes.  Confirmed pressure ulcers found on: n/a.  New potential pressure ulcers noted on n/a. Wound consult placed Yes.  The following interventions in place Pillows.    Patient with partial thickness wound of sacrum between buttocks. Wound consult placed. Area is still blanching but not intact, with no drainage. Patient with pre-existing necrotic of 4th toe of the right leg. Generalized bruising of upper extremity, all other bony prominence intact.

## 2021-05-27 NOTE — PROGRESS NOTES
[facility] Nephrology Daily Progress Note    Date of Service  5/27/2021    Author: Marvin Mahajan M.D.    Chief Complaint  74 y/o man with ESRD HD T,TH,Sat at [place] presented with hypotension and inability to HD. On routine labs noted to have hyperkalemia.  Pt has increased erythema of 4th toe on left with some spreading erythema. Pt reports that this is worsening over the last few weeks, but though it secondary to trauma.     No F/C/N/V/CP/SOB.  No melena, hematochezia, hematemesis.  No HA, visual changes, or abdominal pain.    Daily Nephrology Summary:   5/20 - Consult done  5/21 - sitting up in bed, feeling good, appetite good, mild non-productive cough, tolerated HD yesterday UF: 158ml  5/22 - seen on HD, some hypotension this am, limited UF with HD, vascular to consult   5/23 - sitting up in bed, c/o severe pain in foot and L hand, vascular saw pt and plan poss intervention   5/24 - NAEO, feels good, having issues with dropping objects recently over the past 3-4 days  5/25 - NAEO, no complaints, sleeping comfortably  5/26 - NAEO, no complaints  5/27 - NAEO, no complaints, feels good     Review of Systems  Review of Systems   Constitutional: Negative for chills and fever.   Respiratory: Negative for cough and shortness of breath.    Cardiovascular: Negative for chest pain and leg swelling.   Gastrointestinal: Negative for nausea and vomiting.   Neurological: Negative for seizures and loss of consciousness.   All other systems reviewed and are negative.    Physical Exam  Temp:  [36.4 °C (97.6 °F)-37.7 °C (99.8 °F)] 37.2 °C (99 °F)  Pulse:  [68-93] 86  Resp:  [14-16] 16  BP: ()/(26-48) 108/44  SpO2:  [90 %-99 %] 98 %    Physical Exam  Vitals and nursing note reviewed.   Constitutional:       Appearance: Normal appearance.   HENT:      Head: Normocephalic and atraumatic.   Eyes:      General: No scleral icterus.        Right eye: No discharge.         Left eye: No discharge.   Cardiovascular:       Rate and Rhythm: Normal rate and regular rhythm.   Pulmonary:      Effort: Pulmonary effort is normal.      Breath sounds: Normal breath sounds.   Abdominal:      General: Bowel sounds are normal.      Palpations: Abdomen is soft.   Musculoskeletal:         General: Deformity (Necrotic 3rd toe on left foot) present. No tenderness.      Cervical back: Neck supple. No muscular tenderness.      Right lower leg: No edema.      Left lower leg: No edema.   Neurological:      Mental Status: He is alert. Mental status is at baseline.     Fluids    Intake/Output Summary (Last 24 hours) at 5/27/2021 1054  Last data filed at 5/26/2021 1400  Gross per 24 hour   Intake 322 ml   Output --   Net 322 ml     Laboratory  Recent Labs     05/26/21  1542 05/27/21  0005 05/27/21  0348   WBC 12.1* 13.7*  13.6* 13.4*   RBC 2.26* 2.27*  2.32* 2.42*   HEMOGLOBIN 7.3* 7.5*  7.6* 7.9*   HEMATOCRIT 23.2* 23.0*  23.3* 24.4*   .7* 101.3*  100.4* 100.8*   MCH 32.3 33.0  32.8 32.6   MCHC 31.5* 32.6*  32.6* 32.4*   RDW 69.0* 69.5*  67.7* 68.3*   PLATELETCT 219 209  218 233   MPV 9.9 10.1  10.1 10.3     Recent Labs     05/25/21  0318 05/26/21  0428 05/27/21  0005   SODIUM 133* 133* 133*   POTASSIUM 5.0 4.5 4.7   CHLORIDE 94* 97 95*   CO2 23 27 26   GLUCOSE 112* 116* 105*   BUN 44* 29* 37*   CREATININE 8.37* 5.26* 6.48*   CALCIUM 9.2 8.7 9.0     Recent Labs     05/25/21  0944   INR 1.28*     No results for input(s): NTPROBNP in the last 72 hours.        Imaging  - Reviewed    Assessment  # ESRD   maint HD q  T/T/S via   AVF. Left arm   # Hypotension: cardiogenic vs toe/cellultis   midodrine  # PVD with left 4th great toe discoloration   Per vascular    abx   # Cardiomyopathy: acute worsening. EF now 15-20%  # 80% ostial LAD in-stent restenosis:   # Steal syndrome: s/p DRI. Worsening apparent ischmia in setting of heart failure  # Anemia in CKD   Iron replete    ARMANDO   # Atrial fibrillation   plavix    BB  # Diabetes mellitus  #  Hyperthyroidism   Tapazole  # Hyponatremia, resolved    UF with HD   # Hyperkalemia, corrected    Manage with HD   # Hyperphosphatemia   sevelamer TID with meals   # BMD-CKD   Calcitriol   Sevelamer     PLAN:  - TTS iHD  - UF with HD as tolerated   - DC IVFs, okay to bolus PRN symptomatic hypotension  - Continue binders  - Dose all meds per ESRD

## 2021-05-27 NOTE — PROGRESS NOTES
2 RN skin check complete.   Devices in place nasal canula .  Skin assessed under devices yes.  Confirmed pressure ulcers found on non.  New potential pressure ulcers noted on coccyx. Wound consult placed previously found by day shift, waiting for wound team to come.  Pt's buttocks and sacrum are red but blanching, pt's left heel is red and blanching, ears are pink and elbows are pink.  The following interventions in place: the pt is being turned, he was mepilex placed, will place a waffle mattress when one becomes available.

## 2021-05-27 NOTE — CARE PLAN
The patient is Watcher - Medium risk of patient condition declining or worsening    Shift Goals  Clinical Goals: Increase Blood pressure and hemoglobin level   Patient Goals: Increase Blood pressure and pain control    Progress made toward(s) clinical / shift goals:    Problem: Knowledge Deficit - Standard  Goal: Patient and family/care givers will demonstrate understanding of plan of care, disease process/condition, diagnostic tests and medications  Outcome: Progressing   The pt is educated and updated to the changes in his plan of care and treatments and answers all the questions he has   Problem: Fall Risk  Goal: Patient will remain free from falls  Outcome: Progressing   The pt calls appropriately for assistance  Problem: Skin Integrity  Goal: Skin integrity is maintained or improved  Outcome: Not Progressing   The pt may have developed a possible pressure injury on his coccyx. Pt is being turned every 2 hours . Wound Team was consulted. Placed on low airloss mattress and TAPS initiated ( Wound recommendation) and q 2 hour turn in effect.      Patient is not progressing towards the following goals:        Problem: Skin Integrity  Goal: Skin integrity is maintained or improved  Outcome: Not Progressing

## 2021-05-27 NOTE — PROGRESS NOTES
2 RN skin check completed with JAN Herrera  .   Devices in place nasal canula .  Skin assessed under devices yes.  Confirmed pressure ulcers found on none.  New potential pressure ulcers noted on coccyx. Wound consult placed and seen the patient.  Pt's buttocks and sacrum are red but blanching, pt's left heel is red and blanching, ears are pink and elbows are pink.  The following interventions in place: the pt is being turned, he was mepilex placed,placed on low airloss mattress

## 2021-05-27 NOTE — WOUND TEAM
[facility]  Inpatient Services  Initial Wound and Skin Care Evaluation    Admission Date: 2021     Last order of IP CONSULT TO WOUND CARE was found on 2021 from Hospital Encounter on 2021     HPI, PMH, SH: Reviewed    Past Surgical History:   Procedure Laterality Date   • KNEE AMPUTATION BELOW Right 2020    Procedure: AMPUTATION, BELOW KNEE ...;  Surgeon: Leobardo Lynn M.D.;  Location: SURGERY [facility];  Service: Orthopedics   • TOE AMPUTATION Right 2020    Procedure: AMPUTATION, TOE GREAT;  Surgeon: Leobardo Lynn M.D.;  Location: SURGERY [facility];  Service: Orthopedics   • TOE AMPUTATION Left 2020    Procedure: AMPUTATION, TOE GREAT;  Surgeon: Leobardo Lynn M.D.;  Location: [facility];  Service: Orthopedics   • TENOTOMY Left 2020    Procedure: FLEXOR TENOTOMIES, TWO-FIVE TOES;  Surgeon: Leobardo Lynn M.D.;  Location: [facility];  Service: Orthopedics   • APPENDECTOMY     • OTHER CARDIAC SURGERY      stents x1   • OTHER ORTHOPEDIC SURGERY      knee surgery     Social History     Tobacco Use   • Smoking status: Former Smoker     Packs/day: 1.00     Years: 55.00     Pack years: 55.00     Types: Cigarettes     Quit date: 2014     Years since quittin.3   • Smokeless tobacco: Never Used   Substance Use Topics   • Alcohol use: Not Currently     Chief Complaint   Patient presents with   • Weakness     x2 days   • Dizziness   • Hypotension     per dialysis clinic     Diagnosis: Cellulitis of left foot [L03.116]    Unit where seen by Wound Team: T302/01     WOUND CONSULT/FOLLOW UP RELATED TO:  sacrum    WOUND HISTORY:  Pt presented to ED with worsenng left foot discoloration and pain.  Pt has a R BKA.   Dr Encinas follows pt and determined pt is not a candidate for revascularization.  Pt states that just prior to this admission he was spending most of the time in bed at home.  Transfers where a 'big production'    L BKA  scheduled with Dr Márquez 21    WOUND ASSESSMENT/LDA            Wound 21 Buttocks Posterior between buttock, area blanchable erythema, no drainage, open wound (Active)   Wound Image   21 1000   Site Assessment [place] 21 1000   Periwound Assessment Purple 21 1000   Margins Undefined edges 21 1000   Closure Secondary intention 21 1000   Drainage Amount None 21 1000   Treatments Site care;Offloading 21 1000   Wound Cleansing Approved Wound Cleanser 21 1000   Dressing Options Mepilex 21 1000   Dressing Changed New 21 1000   Dressing Change/Treatment Frequency Every Shift, and As Needed 21 1000   NEXT Dressing Change/Treatment Date 21 1000   NEXT Weekly Photo (Inpatient Only) 21 1000   Wound Length (cm) 3 cm 21 1000   Wound Width (cm) 1.5 cm 21 1000   Wound Surface Area (cm^2) 4.5 cm^2 21 1000   Shape butterfly 21 1000   Wound Odor None 21 1000   Exposed Structures None 21 1000   WOUND NURSE ONLY - Time Spent with Patient (mins) 60 21 1000        Vascular:    PATSY:   PATSY Results, Last 30 Days US-PATSY SINGLE LEVEL BILAT    Result Date: 2021  Narrative  Vascular Laboratory  Conclusions  No flow by PPG 2-5th toes.  severely depressed PATSY dorsalis pedis, normal PATSY posterior tibial on left  side.  AUGUSTINE KLARISSA  Age:    75    Gender:     M  MRN:    [id number]  :    1945      BSA:  Exam Date:     2021 10:13  Room #:     Inpatient  Priority:     Routine  Ht (in):             Wt (lb):  Ordering Physician:        GINGER FRAZIER  Referring Physician:       [id number] Miguel  Sonographer:               Asha Bowens  Study Type:                Limited Bilateral  Technical Quality:         Fair  Indications:     Type 2 diabetes mellitus with foot ulcer  CPT Codes:       06576  ICD Codes:        E11.621  History:         Right below the knee amputation, left big toe amputated, left                   fourth toe ischemia. Prior exam 4/21/20  Limitations:     Right below the knee amputation, left big toe amputated,                   unable to obtain left brachial pressure due to AVF, bandage                   over the right common femoral.                 RIGHT  Waveform            Systolic BPs (mmHg)                             120           Brachial                                           Common Femoral                                           Posterior Tibial                                           Dorsalis Pedis                                           Peroneal                                           PATSY                                           TBI                       LEFT  Waveform        Systolic BPs (mmHg)                                           Brachial  Triphasic                                Common Femoral  Monophasic                 127           Posterior Tibial  Monophasic                 45            Dorsalis Pedis                                           Peroneal                             1.06          PATSY                                           TBI  Findings  Right-  Ankle pressure not obtained due to below the knee amputation.  Bandage over the common femoral artery.  Doppler waveform of the popliteal artery is of high amplitude and  triphasic.  Left-  Ankle-brachial index of the dorsalis pedis artery is severely reduced.  Ankle-brachial index of the posterior tibial artery is normal.  Doppler waveform of the common femoral artery is of high amplitude and  triphasic.  Doppler waveforms at the ankle are monophasic with moderate amplitude.  PPG's-  No flow  Bilateral arterial duplex scan was performed in accordance with lower  extremity arterial evaluation protocol - see separate report.  Mark Rodriguez MD  (Electronically Signed)  Final Date:      21 May 2021  15:25    PATSY Results, Last 30 Days US-EXTREMITY ARTERY LOWER BILAT    Result Date: 2021  Narrative Lower Extremity  Arterial Duplex Report  Vascular Laboratory  CONCLUSIONS  Left below knee diffuse disease, absent flow in peroneal artery.  KLARISSA BRADSHAW  Exam Date:     2021 12:09  Room #:     Inpatient  Priority:     Routine  Ht (in):             Wt (lb):  Ordering Physician:        GINGER FRAZIER  Referring Physician:       [id number] Pastor  Sonographer:               Asha Bowens  Study Type:                Limited Bilateral  Technical Quality:         Adequate  Age:    75    Gender:     M  MRN:    [id number]  :    1945      BSA:  Indications:     Type 2 diabetes mellitus with foot ulcer  CPT Codes:       59827  ICD Codes:       E11.621  History:         Right below the knee amputation, left big toe amputated,                   fourth toe ischemia. Prior exam 20.  Limitations:     Rigth below the knee amputation.                RIGHT  Waveform        Peak Systolic Velocity (cm/s)                  Prox    Prox-Mid  Mid    Mid-Dist  Distal  Triphasic                         52                       CFA  Biphasic        55                                         PFA  Biphasic        44                65      24       56      SFA  Biphasic                          35                       POP  Monophasic      62                                         AT  Monophasic      51                                         PT  Not                                                        LISA  attained                LEFT  Waveform        Peak Systolic Velocity (cm/s)                  Prox    Prox-Mid  Mid    Mid-Dist  Distal  Triphasic                         132                      CFA  Biphasic        43                                         PFA  Biphasic        50                62               42      SFA  Triphasic                         56                        POP  Monophasic      48                                 70      AT  Monophasic      37                                 30      PT  Absent          0                                  54      LISA  FINDINGS  Right-  Calcific atherosclerotic plaque seen throughout the lower extremity.  Stenosis of the distal superficial femoral artery. Velocities are  consistent with 50-75% stenosis.  Proximal anterior and posterior tibial artery waveforms are monophasic.  Proximal peroneal artery was not visualized.  Left-  Calcific atherosclerotic plaque seen throughout the lower extremity.  Monophasic waveforms seen in the anterior and posterior tibial arteries.  Absent flow seen in the proximal peroneal artery.  Mark Rodriguez MD  (Electronically Signed)  Final Date:      21 May 2021 14:42      Lab Values:    Lab Results   Component Value Date/Time    WBC 13.4 (H) 05/27/2021 03:48 AM    RBC 2.42 (L) 05/27/2021 03:48 AM    HEMOGLOBIN 7.9 (L) 05/27/2021 03:48 AM    HEMATOCRIT 24.4 (L) 05/27/2021 03:48 AM    CREACTPROT 24.11 (H) 11/13/2020 10:28 PM    SEDRATEWES >120 (H) 11/13/2020 10:28 PM    HBA1C 5.7 (H) 05/11/2021 06:30 AM        Culture Results show:  Recent Results (from the past 720 hour(s))   CULTURE WOUND W/ GRAM STAIN    Collection Time: 05/20/21  7:59 AM    Specimen: Left Foot; Wound   Result Value Ref Range    Significant Indicator POS (POS)     Source WND     Site LEFT FOOT     Culture Result Light growth usual skin xander. (A)     Gram Stain Result       Rare WBCs.  Many Gram positive cocci.  Few Gram negative rods.      Culture Result (A)      Klebsiella pneumoniae ESBL  Moderate growth  Extended Spectrum Beta-lactamase (ESBL) isolated.  ESBL's may be clinically resistant to therapy with  Penicillins,Cephalosporins or Aztreonam despite  apparent in vitro susceptibility to some of these agents.  The patient requires contact isolation.  Please contact pharmacy or an Infectious Disease Specialist  if you have any  questions about appropriate therapy.      Culture Result Proteus mirabilis  Light growth   (A)     Culture Result (A)      Staphylococcus aureus  Moderate growth  This isolate is presumed to be clindamycin resistant based on  detection of inducible resistance.  Clindamycin may still  be effective in some patients.         Susceptibility    Proteus mirabilis - BINU     Ceftriaxone <=1 Sensitive mcg/mL     Cefazolin <=2 Sensitive mcg/mL     Ciprofloxacin <=0.25 Sensitive mcg/mL     Cefepime <=2 Sensitive mcg/mL     Cefuroxime <=4 Sensitive mcg/mL     Ertapenem <=0.5 Sensitive mcg/mL     Gentamicin <=2 Sensitive mcg/mL     Moxifloxacin <=2 Sensitive mcg/mL     Pip/Tazobactam <=8 Sensitive mcg/mL     Trimeth/Sulfa <=0.5/9.5 Sensitive mcg/mL     Tobramycin <=2 Sensitive mcg/mL     Ampicillin <=8 Sensitive mcg/mL    Staphylococcus aureus - BINU     Cefazolin <=8 Sensitive mcg/mL     Cefepime <=4 Sensitive mcg/mL     Ampicillin/sulbactam <=8/4 Sensitive mcg/mL     Pip/Tazobactam <=8 Sensitive mcg/mL     Vancomycin 1 Sensitive mcg/mL     Trimeth/Sulfa <=0.5/9.5 Sensitive mcg/mL     Tetracycline <=4 Sensitive mcg/mL     Azithromycin >4 Resistant mcg/mL     Clindamycin <=0.25 Resistant mcg/mL     Ceftaroline <=0.5 Sensitive mcg/mL     Daptomycin <=0.5 Sensitive mcg/mL     Erythromycin >4 Resistant mcg/mL     Oxacillin 0.5 Sensitive mcg/mL    Klebsiella pneumoniae esbl - BINU     Ceftriaxone >32 Extended Spectrum Beta Lactamase mcg/mL     Cefazolin >16 Resistant mcg/mL     Ciprofloxacin >2 Resistant mcg/mL     Cefepime >16 Resistant mcg/mL     Cefuroxime >16 Resistant mcg/mL     Ertapenem <=0.5 Sensitive mcg/mL     Gentamicin <=2 Sensitive mcg/mL     Moxifloxacin 4 Intermediate mcg/mL     Ampicillin/sulbactam 16/8 Intermediate mcg/mL     Pip/Tazobactam <=8 Sensitive mcg/mL     Trimeth/Sulfa >2/38 Resistant mcg/mL     Tigecycline <=2 Sensitive mcg/mL     Tobramycin <=2 Sensitive mcg/mL       Pain Level/Medicated:  No report of  pain at sacrum       INTERVENTIONS BY WOUND TEAM:   Performed standard wound care which includes appropriate positioning, dressing removal and non-selective debridement. Pictures and measurements obtained weekly if/when required.    Cleansed with:  damp cloth  Sharp debridement: na  Genesis wound: Cleansed with damp cloth  Primary Dressing: mepilex  Secondary (Outer) Dressing:     Interdisciplinary consultation: Patient, Bedside RN, manager and supervisor    EVALUATION / RATIONALE FOR TREATMENT:  Most Recent Date:  5/27/21 pt with discoloration around sacrum.  Pt's mobility is compromised although he was able to move in bed with minimal assistance.  Pt appears to have dusky discoloration several areas of the body including L foot, B hands.  Pt has known cardiac disease, PAD, and sleep apnea in addition to DM and HTN all affecting his tissue perfusion.       Goals: Steady decrease in wound area and depth weekly.    WOUND TEAM PLAN OF CARE ([X] for frequency of wound follow up,):   Nursing to follow orders written for wound care. Contact wound team if area fails to progress, deteriorates or with any questions/concerns  Dressing changes by wound team:                   Follow up 3 times weekly:                NPWT change 3 times weekly:     Follow up 1-2 times weekly:      Follow up Bi-Monthly:                   Follow up as needed:  X   Other (explain):     NURSING PLAN OF CARE ORDERS (X):  Dressing changes: See Dressing Care orders: X  Skin care: See Skin Care orders: x  RN Prevention Protocol: X  Rectal tube care: See Rectal Tube Care orders:   Other orders:    RSKIN:   CURRENTLY IN PLACE (X), APPLIED THIS VISIT (A), ORDERED (O):   Q shift Kwame:  X  Q shift pressure point assessments:  X    Surface/Positioning   Pressure redistribution mattress            Low Airloss     O     Bariatric foam      Bariatric GELA     Waffle cushion      O  Waffle Overlay          Reposition q 2 hours   X   TAPs Turning system   O  Z Shad  Pillow     Offloading/Redistribution   Sacral Mepilex (Silicone dressing)  O   Heel Mepilex (Silicone dressing)         Heel float boots (Prevalon boot)             Float Heels off Bed with Pillows           Respiratory   Silicone O2 tubing         Gray Foam Ear protectors   X  Cannula fixation Device (Tender )          High flow offloading Clip    Elastic head band offloading device      Anchorfast                                                         Trach with Optifoam split foam             Containment/Moisture Prevention    Rectal tube or BMS    Purwick/Condom Cath        Tomas Catheter    Barrier wipes           Barrier paste       Antifungal tx      Interdry        Mobilization       Up to chair        Ambulate      PT/OT      Nutrition       Dietician        Diabetes Education      PO     TF     TPN     NPO   # days     Other        Anticipated discharge plans:   LTACH:        SNF/Rehab:     X pt will need ongoing care to sacrum             Home Health Care:           Outpatient Wound Center:            Self/Family Care:        Other:

## 2021-05-27 NOTE — PROGRESS NOTES
0700 Patient's in bed. Bedside report received from SIERRA Gambino RN at the beginning of the shift.     0821 New order received from LAMAR Aguirre @ midnight tonight, sips with medications.     0840 Patient's sitting up in bed. Educated on the importance/use of IS at least 10x every hour while awake, able to reach 1000. Fall protocol in effect. Call light within reach. Reminded patient to call for assist. Assessment completed. No distress noted. Plan of care reviewed with the patient. Verbalized understanding. Isolation precaution observed.     1018 Medicated with Tramadol (see MAR) for c/o's left had and leg, rates pain 6/10.    1130 Patient's having Dialysis in his room.     1355 Patient's still having Dialysis in his room. No distress noted.    1535 Patient's in bed. No distress noted.     1656 Patient placed on low airloss mattress and TAPS.     1750 Medicate with Mucinex (see MAR) for c/o's cough.     1800 COVD swab sent to the lab as per order. Spouse visiting.    1900 Patient's in bed. Bedside report given to Oncalf ESQUIVEL RN (Maki).

## 2021-05-27 NOTE — PROGRESS NOTES
Pt still in 9/10 pain, despite medications that were given at 2048, the MD Dr. Knight ordered a one time dose of his 300 mg dose of gabapentin.

## 2021-05-27 NOTE — DIETARY
[facility] - Alert received for newly identified wound. Wound team consult pending, will await wound staging to make recommendations if appropriate. RD will monitor per dept policy.    Noted pt scheduled for L BKA 5/28.    Current admit wt is stated; requested updated weight from nursing team.    RD following

## 2021-05-27 NOTE — PROGRESS NOTES
Pt still complaining of 8/10 pain after giving him the tramadol 50 mg at 2048 with no effect. This RN paged the on-call hospitalist Dr. Knight  At 2205 to inform them of the situation and to possibly order another medication to assist in pain relief. He was also made aware that the pt is usually on gabapentin 300 mg oral daily but it was held by the day shift MD.   He was made aware that the pt is also production a moderate amount of phlem and also asked to change the time between doses of the pt's Mucinex tablets which is currently at 600 mg oral every 12 hours. The MD said he would look into ordering the pt something for pain management but that this RN could increase the Mucinex to every 6 hours from every 12 hours.

## 2021-05-27 NOTE — CARE PLAN
The patient is Watcher - Medium risk of patient condition declining or worsening    Shift Goals  Clinical Goals: Increase BP, hemoglobin level   Patient Goals: Increase BP, Pain control    Progress made toward(s) clinical / shift goals:    Problem: Knowledge Deficit - Standard  Goal: Patient and family/care givers will demonstrate understanding of plan of care, disease process/condition, diagnostic tests and medications  Outcome: Progressing   The pt is educated and updated to the changes in his plan of care and treatments and answers all the questions he has   Problem: Fall Risk  Goal: Patient will remain free from falls  Outcome: Progressing   The pt calls appropriately for assistance  Problem: Skin Integrity  Goal: Skin integrity is maintained or improved  Outcome: Not Progressing   The pt may have developed a possible pressure injury on his coccyx. Pt is being turned every 2 hours      Patient is not progressing towards the following goals:      Problem: Skin Integrity  Goal: Skin integrity is maintained or improved  Outcome: Not Progressing

## 2021-05-27 NOTE — PROGRESS NOTES
Infectious Disease Progress Note    Author: Mary Kate Moser M.D. Date & Time of service: 2021  2:19 PM    Chief Complaint:  Gangrene left toes  Cellulitis    Interval History:  75 y.o. male admitted 2021. + diabetes, ESRD on HD, peripheral arterial disease, A. fib, history of left great toe amputation due to osteomyelitis in May 2020, hyperlipidemia, status post right BKA in 2020, CAD with recent ischemic cardiomyopathy, admitted on 2021 with left foot pain, found to have a necrotic appearing left fourth toe.  Swab obtained +ESBL Klebsiella, Proteus, MSSA   AF WBC ID reconsulted as patient with increased WBC and hemodynamically less stable (hypotension)+left foot pain   AF WBC 13.4 getting HD in room-foot remains very tender, especially the heel toes 3-5 gangrenous/ischemic    Labs Reviewed, Medications Reviewed, Radiology Reviewed and Wound Reviewed.    Review of Systems:  Review of Systems   Constitutional: Negative for fever.   Respiratory: Negative for cough and shortness of breath.    Cardiovascular: Negative for chest pain.   Gastrointestinal: Negative for abdominal pain, diarrhea, nausea and vomiting.   Skin: Negative for rash.   Neurological: Positive for sensory change.   All other systems reviewed and are negative.      Hemodynamics:  Temp (24hrs), Av.1 °C (98.7 °F), Min:36.4 °C (97.6 °F), Max:37.3 °C (99.2 °F)  Temperature: 37.3 °C (99.2 °F)  Pulse  Av.5  Min: 45  Max: 108   Blood Pressure : (!) 106/36 (Rn notified)       Physical Exam:  Physical Exam  Vitals and nursing note reviewed.   Constitutional:       General: He is not in acute distress.     Appearance: He is not toxic-appearing or diaphoretic.   HENT:      Nose: No rhinorrhea.      Mouth/Throat:      Pharynx: No oropharyngeal exudate.      Comments: Poor dentition  Eyes:      Extraocular Movements: Extraocular movements intact.      Pupils: Pupils are equal, round, and reactive to light.      Cardiovascular:      Rate and Rhythm: Normal rate. Rhythm irregular.   Pulmonary:      Effort: Pulmonary effort is normal. No respiratory distress.      Breath sounds: No stridor.   Abdominal:      General: There is no distension.      Palpations: Abdomen is soft.      Tenderness: There is no abdominal tenderness.   Musculoskeletal:         General: Tenderness present.      Comments: Right BKA  S/p left great toe amp  4 and 5th digits gangrenous; 3rd toe ischemic  Ischemic changes to forefoot   Skin:     Coloration: Skin is not jaundiced.      Findings: Lesion present.   Neurological:      General: No focal deficit present.      Mental Status: He is alert and oriented to person, place, and time.   Psychiatric:         Mood and Affect: Mood normal.         Behavior: Behavior normal.         Meds:    Current Facility-Administered Medications:   •  meropenem  •  MD Alert...Total Body Iron Replacement per Pharmacy  •  traMADol  •  guaiFENesin ER  •  acetaminophen  •  clopidogrel  •  [Held by provider] heparin  •  sevelamer carbonate  •  atorvastatin  •  calcitRIOL  •  [Held by provider] gabapentin  •  [Held by provider] insulin glargine  •  methimazole  •  midodrine  •  tamsulosin  •  senna-docusate **AND** polyethylene glycol/lytes **AND** [DISCONTINUED] magnesium hydroxide **AND** bisacodyl  •  ondansetron  •  ondansetron  •  insulin regular **AND** POC blood glucose manual result **AND** NOTIFY MD and PharmD **AND** glucose **AND** dextrose 50%  •  epoetin  •  heparin    Labs:  Recent Labs     05/25/21  0318 05/25/21  0318 05/26/21  0428 05/26/21  0756 05/26/21  1542 05/27/21  0005 05/27/21  0348   WBC 11.6*   < > 12.3*   < > 12.1* 13.7*  13.6* 13.4*   RBC 2.08*   < > 1.93*   < > 2.26* 2.27*  2.32* 2.42*   HEMOGLOBIN 7.1*   < > 6.5*   < > 7.3* 7.5*  7.6* 7.9*   HEMATOCRIT 21.5*   < > 20.6*   < > 23.2* 23.0*  23.3* 24.4*   .4*   < > 106.7*   < > 102.7* 101.3*  100.4* 100.8*   MCH 34.1*   < > 33.7*   < >  32.3 33.0  32.8 32.6   RDW 59.2*   < > 62.0*   < > 69.0* 69.5*  67.7* 68.3*   PLATELETCT 218   < > 215   < > 219 209  218 233   MPV 9.8   < > 10.0   < > 9.9 10.1  10.1 10.3   NEUTSPOLYS 80.90*  --  80.50*  --   --  82.20*  --    LYMPHOCYTES 8.50*  --  8.10*  --   --  7.20*  --    MONOCYTES 8.50  --  10.00  --   --  8.10  --    EOSINOPHILS 1.00  --  0.30  --   --  0.90  --    BASOPHILS 0.30  --  0.20  --   --  0.70  --     < > = values in this interval not displayed.     Recent Labs     05/25/21 0318 05/26/21 0428 05/27/21  0005   SODIUM 133* 133* 133*   POTASSIUM 5.0 4.5 4.7   CHLORIDE 94* 97 95*   CO2 23 27 26   GLUCOSE 112* 116* 105*   BUN 44* 29* 37*     Recent Labs     05/25/21 0318 05/26/21 0428 05/27/21  0005   ALBUMIN 3.1* 2.9* 2.9*   CREATININE 8.37* 5.26* 6.48*       Imaging:  DX-CHEST-PORTABLE (1 VIEW)    Result Date: 5/20/2021 5/20/2021 7:10 AM HISTORY/REASON FOR EXAM:  Chest Pain. TECHNIQUE/EXAM DESCRIPTION AND NUMBER OF VIEWS: Single portable view of the chest. COMPARISON: 5/10/2021 FINDINGS: The cardiomediastinal silhouette is enlarged. Atherosclerotic calcification is seen. There is pleural thickening and calcification at the left lung base. There is patchy left basilar opacity. There is blunting of the left costophrenic angle. No pneumothorax is seen.     Left lower lobe pleural thickening and calcification may be related to sequelae of prior infection or trauma. Patchy left basilar opacity may represent atelectasis or pneumonitis. Mild cardiomegaly. Atherosclerotic plaque.     DX-CHEST-PORTABLE (1 VIEW)    Result Date: 5/10/2021    5/10/2021 9:25 PM HISTORY/REASON FOR EXAM: Shortness of Breath TECHNIQUE/EXAM DESCRIPTION:  Single AP view of the chest. COMPARISON: January 19, 2021 FINDINGS: Overlying cardiac leads are present. Cardiomegaly is observed. The mediastinal contour appears within normal limits.  Bilateral perihilar interstitial prominence and bronchial wall cuffing is noted. The  lungs appear well expanded bilaterally.  Hazy left lung base opacity is seen. Blunting of the left costophrenic angle is seen suggesting trace left effusion. The bony structures appear age-appropriate.     1.  Left lung base infiltrate or atelectasis with small left pleural effusion 2.  Cardiomegaly 3.  Perihilar interstitial prominence and bronchial wall cuffing, appearance suggests changes of underlying bronchial inflammation, consider bronchitis.    DX-FOOT-2- LEFT    Result Date: 2021 7:35 AM HISTORY/REASON FOR EXAM:  cellulits starting from left 4th toe eval for osteo TECHNIQUE/EXAM DESCRIPTION AND NUMBER OF VIEWS: 2 views of the LEFT foot. COMPARISON:  2020 FINDINGS: Prior partial amputation of great toe at the base of proximal phalanx. Soft tissue swelling of 4th toe. No soft tissue gas. Diffuse vascular calcifications. No focal bony destruction. Calcaneal enthesophytes. Calcification dorsal to calcaneus, possibly within Achilles tendon, chronic.     1.  Soft tissue swelling of LEFT 4th toe. 2.  No gross bony destruction however osteomyelitis is not excluded by plain film. 3.  Prior partial amputation of great toe.    US-PATSY SINGLE LEVEL BILAT    Result Date: 2021   Vascular Laboratory  Conclusions  No flow by PPG 2-5th toes.  severely depressed PATSY dorsalis pedis, normal PATSY posterior tibial on left  side.  KLARISSA BRADSHAW  Age:    75    Gender:     M  MRN:    [id number]  :    1945      BSA:  Exam Date:     2021 10:13  Room #:     Inpatient  Priority:     Routine  Ht (in):             Wt (lb):  Ordering Physician:        GINGER FRAZIER  Referring Physician:       [id number] Miguel  Sonographer:               Asha Bowens  Study Type:                Limited Bilateral  Technical Quality:         Fair  Indications:     Type 2 diabetes mellitus with foot ulcer  CPT Codes:       95730  ICD Codes:        E11.621  History:         Right below the knee amputation, left big toe amputated, left                   fourth toe ischemia. Prior exam 4/21/20  Limitations:     Right below the knee amputation, left big toe amputated,                   unable to obtain left brachial pressure due to AVF, bandage                   over the right common femoral.                 RIGHT  Waveform            Systolic BPs (mmHg)                             120           Brachial                                           Common Femoral                                           Posterior Tibial                                           Dorsalis Pedis                                           Peroneal                                           PATSY                                           TBI                       LEFT  Waveform        Systolic BPs (mmHg)                                           Brachial  Triphasic                                Common Femoral  Monophasic                 127           Posterior Tibial  Monophasic                 45            Dorsalis Pedis                                           Peroneal                             1.06          PATSY                                           TBI  Findings  Right-  Ankle pressure not obtained due to below the knee amputation.  Bandage over the common femoral artery.  Doppler waveform of the popliteal artery is of high amplitude and  triphasic.  Left-  Ankle-brachial index of the dorsalis pedis artery is severely reduced.  Ankle-brachial index of the posterior tibial artery is normal.  Doppler waveform of the common femoral artery is of high amplitude and  triphasic.  Doppler waveforms at the ankle are monophasic with moderate amplitude.  PPG's-  No flow  Bilateral arterial duplex scan was performed in accordance with lower  extremity arterial evaluation protocol - see separate report.  Mark Rodriguez MD  (Electronically Signed)  Final Date:      21 May 2021  15:25    US-EXTREMITY ARTERY LOWER BILAT    Result Date: 2021  Lower Extremity  Arterial Duplex Report  Vascular Laboratory  CONCLUSIONS  Left below knee diffuse disease, absent flow in peroneal artery.  KLARISSA BRADSHAW  Exam Date:     2021 12:09  Room #:     Inpatient  Priority:     Routine  Ht (in):             Wt (lb):  Ordering Physician:        GINGER FRAZIER  Referring Physician:       [id number] Pastor  Sonographer:               Asha Bowens  Study Type:                Limited Bilateral  Technical Quality:         Adequate  Age:    75    Gender:     M  MRN:    [id number]  :    1945      BSA:  Indications:     Type 2 diabetes mellitus with foot ulcer  CPT Codes:       36138  ICD Codes:       E11.621  History:         Right below the knee amputation, left big toe amputated,                   fourth toe ischemia. Prior exam 20.  Limitations:     Rigth below the knee amputation.                RIGHT  Waveform        Peak Systolic Velocity (cm/s)                  Prox    Prox-Mid  Mid    Mid-Dist  Distal  Triphasic                         52                       CFA  Biphasic        55                                         PFA  Biphasic        44                65      24       56      SFA  Biphasic                          35                       POP  Monophasic      62                                         AT  Monophasic      51                                         PT  Not                                                        LISA  attained                LEFT  Waveform        Peak Systolic Velocity (cm/s)                  Prox    Prox-Mid  Mid    Mid-Dist  Distal  Triphasic                         132                      CFA  Biphasic        43                                         PFA  Biphasic        50                62               42      SFA  Triphasic                         56                       POP  Monophasic      48                                  70      AT  Monophasic      37                                 30      PT  Absent          0                                  54      LISA  FINDINGS  Right-  Calcific atherosclerotic plaque seen throughout the lower extremity.  Stenosis of the distal superficial femoral artery. Velocities are  consistent with 50-75% stenosis.  Proximal anterior and posterior tibial artery waveforms are monophasic.  Proximal peroneal artery was not visualized.  Left-  Calcific atherosclerotic plaque seen throughout the lower extremity.  Monophasic waveforms seen in the anterior and posterior tibial arteries.  Absent flow seen in the proximal peroneal artery.  Mark Rodriguez MD  (Electronically Signed)  Final Date:      21 May 2021 14:42    US-HEMODIALYSIS GRAFT DUPLEX COMP UPPER EXTREMITY    Result Date: 2021   Hemodialysis Access Report  Vascular Laboratory  Conclusions  1) Patent left brachial, radial, and ulnar arteries with dense calcific  plaque throughout.  2) Patent DRIL bypass graft.  Flow in the distal left radial artery is  antegrade and monophasic to biphasic with good waveforms.  Flow in the  distal ulnar artery is attenuated.  3) Patent left brachiocephalic fistula with area of narrowing seen in the  distal bicep area.  Flow volume is 499ml/min in the mid biceps.  KLARISSA BRADSHAW  Exam Date:     2021 12:46  Room #:     Inpatient  Priority:     Stat  Ht (in):             Wt (lb):  Ordering Physician:        MOHIT LUTZ  Referring Physician:       BOLIVAR Trejo  Sonographer:               Patricia Wheatley RVT  Study Type:                Complete Unilateral  Technical Quality:         Adequate  Age:    75    Gender:     M  MRN:    [id number]  :    1945      BSA:  Indications:     End stage renal disease  CPT Codes:       69403  ICD Codes:       N18.6  History:         Left brachiocephalic fistula with DRIL procedure. Prior study                   done 1/3/21.  Limitations:   Exam Data:  Side:          Left  Access          Fistula  Inflow Artery   Brachial  Outflow Vein    Cephalic                     Velocity (cm/s)   Prox Inflow artery          133.0                               0   Mid Inflow artery           165.0                               0   Distal Inflow artery        141.0                               0   Inflow Anastomosis          301.0                               0   Inflow Artery distal to     28.00   anastomosis          Antegrade   Proximal Graft   Mid Graft   Distal Graft   Outflow Anastomosis   Proximal Outflow Vein       286.0                               0   Mid Outflow Vein            135.0                               0   Distal Outflow Vein         77.00   Flow Volume Mid Bicep       499.0ml/min                               0   Flow Volume Mid-Forearm          ml/min   Prox Outflow Vein Diam      0.59  cm   Mid Outflow Vein Diam       0.73  cm   Distal Outflow Vein Diam    0.63  cm  Findings  Dense calcific plaque is seen in the brachial, radial and ulnar arteries.  DRIL Bypass graft is patent without evidence of stenosis.  Velocities in the DRIL bypass are as follows (cm/s): Proximal to prox  anastamosis: 204 Prox anastamosis: 172 Prox: 53 Mid: 25 Distal: 21  Distal  anastamosis: 20  Distal to distal anastamosis: 28.  The radial and ulnar arteries are patent distally.  The cephalic vein is patent. Elevated velocity and diameter reduction is  seen in the cephalic vein at distal bicep. Velocity increases from 135 cm/s  to 400 cm/s. Diameter size decreases from 0.74 cm to 0.41 cm.  Flow volume is listed above along with other velocities and diameter  measurments through the bicep.  Johnathan Honeycutt M.D.  (Electronically Signed)  Final Date:      13 May 2021 08:05    EC-ECHOCARDIOGRAM COMPLETE W/O CONT    Result Date: 5/12/2021  Transthoracic Echo Report Echocardiography Laboratory CONCLUSIONS Compared to the images of the study done 11/14/2020 - there is  now reduced ejection fraction with atrial fibrillation. Moderately reduced left ventricular systolic function. Left ventricular ejection fraction is visually estimated to be 30%. Global hypokinesis. Diastolic function is difficult to assess with atrial fibrillation. The right ventricle was normal in size and function. No significant valve disease or flow abnormalities. These findings communicated to the ordering physician at the time of the exam. KLARISSA BRADSHAW Exam Date:         2021                    10:10 Exam Location:     Inpatient Priority:          Routine Ordering Physician:        NELSON CHILDERS                             ([id number]) Referring Physician:       [id number] Sonographer:               Mahamed RHODES Age:    75     Gender:    M MRN:    [id number] :    1945 BSA:    1.87   Ht (in):    66     Wt (lb):    170 Exam Type:     Complete Indications:     Shortness of breath ICD Codes:       R06.02 CPT Codes:       21056 BP:   125    /   84     HR:   100 Technical Quality:       Fair MEASUREMENTS  (Male / Female) Normal Values 2D ECHO LV Diastolic Diameter PLAX        5.7 cm                4.2 - 5.9 / 3.9 - 5.3 cm LV Systolic Diameter PLAX         4.4 cm                2.1 - 4.0 cm IVS Diastolic Thickness           0.76 cm               LVPW Diastolic Thickness          0.89 cm               LVOT Diameter                     2.5 cm                Estimated LV Ejection Fraction    30 %                  LV Ejection Fraction MOD BP       35.6 %                >= 55  % LV Ejection Fraction MOD 4C       36 %                  LV Ejection Fraction MOD 2C       34.8 %                IVC Diameter                      2.1 cm                DOPPLER AV Peak Velocity                  1.1 m/s               AV Peak Gradient                  5.1 mmHg              AV Mean Gradient                  3.4 mmHg              LVOT Peak Velocity                0.74 m/s               AV Area Cont Eq vti               2.8 cm2               TR Peak Velocity                  221 cm/s              PV Peak Velocity                  0.63 m/s              PV Peak Gradient                  1.6 mmHg              RVOT Peak Velocity                0.51 m/s              * Indicates values subject to auto-interpretation LV EF:  30    % FINDINGS Left Ventricle Normal left ventricular chamber size. Normal left ventricular wall thickness. Moderately reduced left ventricular systolic function. Left ventricular ejection fraction is visually estimated to be 30%. Global hypokinesis. Diastolic function is difficult to assess with atrial fibrillation. Right Ventricle The right ventricle was normal in size and function. Right Atrium The right atrium is normal in size.  Dilated inferior vena cava without inspiratory collapse. Left Atrium Moderately dilated left atrium. Left atrial volume index is 46 mL/sq m. Mitral Valve Structurally normal mitral valve without significant stenosis. Trace mitral regurgitation. Aortic Valve Tricuspid aortic valve. No stenosis or regurgitation seen. Tricuspid Valve Structurally normal tricuspid valve without significant stenosis. Trace tricuspid regurgitation. Unable to estimate pulmonary artery pressure due to an inadequate tricuspid regurgitant jet. Pulmonic Valve The pulmonic valve is not well visualized. No pulmonic stenosis. Trace pulmonic insufficiency. Pericardium Normal pericardium without effusion. Aorta The aortic root is normal.  Ascending aorta diameter is 3.4 cm. Tito Lucas MD (Electronically Signed) Final Date:     12 May 2021 12:03      Micro:  Results     Procedure Component Value Units Date/Time    Blood Culture [[id number]]     Order Status: No result Specimen: Blood from Peripheral     Blood Culture [[id number]]     Order Status: No result Specimen: Blood from Peripheral     BLOOD CULTURE [[id number]] Collected: 05/20/21 0740    Order Status: Completed  "Specimen: Blood from Peripheral Updated: 05/25/21 0900     Significant Indicator NEG     Source BLD     Site PERIPHERAL     Culture Result No growth after 5 days of incubation.    Narrative:      Per Hospital Policy: Only change Specimen Src: to \"Line\" if  specified by physician order.  No site indicated    BLOOD CULTURE [[id number]] Collected: 05/20/21 0748    Order Status: Completed Specimen: Blood from Peripheral Updated: 05/25/21 0900     Significant Indicator NEG     Source BLD     Site PERIPHERAL     Culture Result No growth after 5 days of incubation.    Narrative:      Per Hospital Policy: Only change Specimen Src: to \"Line\" if  specified by physician order.  No site indicated    CULTURE WOUND W/ GRAM STAIN [[id number]]  (Abnormal)  (Susceptibility) Collected: 05/20/21 0759    Order Status: Completed Specimen: Wound from Left Foot Updated: 05/24/21 0710     Significant Indicator POS     Source WND     Site LEFT FOOT     Culture Result Light growth usual skin xander.     Gram Stain Result Rare WBCs.  Many Gram positive cocci.  Few Gram negative rods.       Culture Result Klebsiella pneumoniae ESBL  Moderate growth  Extended Spectrum Beta-lactamase (ESBL) isolated.  ESBL's may be clinically resistant to therapy with  Penicillins,Cephalosporins or Aztreonam despite  apparent in vitro susceptibility to some of these agents.  The patient requires contact isolation.  Please contact pharmacy or an Infectious Disease Specialist  if you have any questions about appropriate therapy.        Proteus mirabilis  Light growth        Staphylococcus aureus  Moderate growth  This isolate is presumed to be clindamycin resistant based on  detection of inducible resistance.  Clindamycin may still  be effective in some patients.      Narrative:      CALL  [facility]  131 tel. [phone],  CALLED  131 tel. [phone] 05/22/2021, 09:32, RB PERF. RESULTS CALLED TO: RN  [id number]    Susceptibility     Klebsiella pneumoniae esbl (1)     Antibiotic " Interpretation Microscan Method Status    Ceftriaxone Extended Spectrum Beta Lactamase >32 mcg/mL BINU Final    Cefazolin Resistant >16 mcg/mL BINU Final    Ciprofloxacin Resistant >2 mcg/mL BINU Final    Cefepime Resistant >16 mcg/mL BINU Final    Cefuroxime Resistant >16 mcg/mL BINU Final    Ertapenem Sensitive <=0.5 mcg/mL BINU Final    Gentamicin Sensitive <=2 mcg/mL BINU Final    Moxifloxacin Intermediate 4 mcg/mL BINU Final    Ampicillin/sulbactam Intermediate 16/8 mcg/mL BINU Final    Pip/Tazobactam Sensitive <=8 mcg/mL BINU Final    Trimeth/Sulfa Resistant >2/38 mcg/mL BINU Final    Tigecycline Sensitive <=2 mcg/mL BINU Final    Tobramycin Sensitive <=2 mcg/mL BINU Final          Proteus mirabilis (2)     Antibiotic Interpretation Microscan Method Status    Ceftriaxone Sensitive <=1 mcg/mL BINU Final    Cefazolin Sensitive <=2 mcg/mL BINU Final    Ciprofloxacin Sensitive <=0.25 mcg/mL BINU Final    Cefepime Sensitive <=2 mcg/mL BINU Final    Cefuroxime Sensitive <=4 mcg/mL BINU Final    Ertapenem Sensitive <=0.5 mcg/mL IBNU Final    Gentamicin Sensitive <=2 mcg/mL BINU Final    Moxifloxacin Sensitive <=2 mcg/mL BINU Final    Pip/Tazobactam Sensitive <=8 mcg/mL BINU Final    Trimeth/Sulfa Sensitive <=0.5/9.5 mcg/mL BINU Final    Tobramycin Sensitive <=2 mcg/mL BINU Final    Ampicillin Sensitive <=8 mcg/mL BINU Final          Staphylococcus aureus (3)     Antibiotic Interpretation Microscan Method Status    Cefazolin Sensitive <=8 mcg/mL BINU Final    Cefepime Sensitive <=4 mcg/mL BINU Final    Ampicillin/sulbactam Sensitive <=8/4 mcg/mL BINU Final    Pip/Tazobactam Sensitive <=8 mcg/mL BINU Final    Vancomycin Sensitive 1 mcg/mL BINU Final    Trimeth/Sulfa Sensitive <=0.5/9.5 mcg/mL BINU Final    Tetracycline Sensitive <=4 mcg/mL BINU Final    Azithromycin Resistant >4 mcg/mL BINU Final    Clindamycin Resistant <=0.25 mcg/mL BINU Final    Ceftaroline Sensitive <=0.5 mcg/mL BINU Final    Daptomycin Sensitive <=0.5 mcg/mL BINU Final     Erythromycin Resistant >4 mcg/mL BINU Final    Oxacillin Sensitive 0.5 mcg/mL BINU Final            Condensed View                   GRAM STAIN [[id number]] Collected: 05/20/21 0759    Order Status: Completed Specimen: Wound Updated: 05/23/21 0719     Significant Indicator .     Source WND     Site LEFT FOOT     Gram Stain Result Rare WBCs.  Many Gram positive cocci.  Few Gram negative rods.      Narrative:      CALL  [facility]  131 tel. [phone],  CALLED  131 tel. [phone] 05/22/2021, 09:32, RB PERF. RESULTS CALLED TO: RN  [phone]          Assessment:  Active Hospital Problems    Diagnosis    • *Cellulitis of left foot [L03.116]    • Ischemia of toe [I99.8]    • DNR (do not resuscitate) [Z66]    • Advance care planning [Z71.89]    • Status post below-knee amputation of right lower extremity (Formerly [facility]) [Z89.511]    • Cardiomyopathy, ischemic [I25.5]    • Peripheral vascular disease (Formerly [facility]) [I73.9]    • Hypotension [I95.9]    • Paroxysmal A-fib (Formerly [facility]) [I48.0]    • End stage renal disease on dialysis (Formerly [facility]) [N18.6, Z99.2]    • Type 2 diabetes mellitus with kidney complication, with long-term current use of insulin (Formerly [facility]) [E11.29, Z79.4]    • Macrocytic anemia [D53.9]    • CAD (coronary artery disease) [I25.10]    • Hyponatremia [E87.1]      Necrotic left fourth and fifth toe  Ischemic forefoot  Diabetes  ESRD on HD  History of left great toe osteomyelitis-s/p amputation  Status post right BKA  CAD  Ischemic cardiomyopathy  Severe PVD     Plan:   Continue meropenem   Vascular surgery consulted NO intervention feasible  Plan is now for left BKA 5/28  Adjust dose abx for renal function  Keep BS under 150 to help control current infection      [facility]

## 2021-05-27 NOTE — PROGRESS NOTES
Hospital Medicine Daily Progress Note    Date of Service  5/27/2021    Chief Complaint  75 y.o. male admitted 5/20/2021 with weakness and dizziness    Hospital Course  75 y.o. male who presented 5/20/2021 with left foot pain  Mr. Sepulveda has a past medical history of end-stage renal disease on dialysis, peripheral vascular disease status post right below the knee amputation, insulin-dependent diabetes mellitus, there was most recently admitted here from 5/10/2021 through 5/18/2021 with shortness of breath.  During that hospitalization he underwent heart catheterization revealing ejection fraction of 15 to 20% with 80% ostial LAD in-stent restenosis and distal RCA disease.  He presents back today with 4 to 5 days of left foot pain especially when walking with his walker.  He has been compliant with dialysis and went on Tuesday.  He was admitted for cellulitis of the left foot with what appears to be an ischemic left fourth toe.  He has been on Eliquis which was be held in case he requires amputation.  Upon admission, he was seen by LPS.  ID saw him as well and DC'd his antibiotics as he was not septic.  Palliative care saw him and patient does want surgery for now.  Vascular is evaluating him to see if he is a candidate for surgery.      5/21: PATSY done and arterial ultrasound pending.  Patient feels okay with no fevers or chills so far.  5/22: Find of some loose stools.  Culture started growing ESBL.  Consulted ID Dr. Love.  5/23: Hgb did decrease to 7.1 but stable at 7.5 on repeat.  I have asked and heparin were appropriately held overnight, now resuming.   5/24: Seen by palliative.  Patient does want surgery for now.  Updated vascular.    Interval Problem Update  Patient evaluated bedside and reporting left foot pain  BP better today, patient more awake and in no acute distress  Hb 7.9 today  WBC trending down slowly  Wound culture growing Klebsiella pneumonia ESBL, but use me diabetes, and Staph aureus    Continue meropenem as per infectious disease recommendations  Orthopedic surgery following, planning for left BKA on a.m.  Labs in a.m.    Disposition  Left BKA    Review of Systems  Review of Systems   Constitutional: Negative for fever.   Respiratory: Negative for cough, shortness of breath and wheezing.    Cardiovascular: Negative for chest pain.   Gastrointestinal: Positive for diarrhea (loose Stools). Negative for abdominal pain, constipation, nausea and vomiting.   Genitourinary: Negative for dysuria.   Musculoskeletal: Positive for joint pain.   Neurological: Negative for headaches.   Psychiatric/Behavioral: The patient is nervous/anxious.         Physical Exam  Temp:  [36.4 °C (97.6 °F)-37.3 °C (99.2 °F)] 37.3 °C (99.2 °F)  Pulse:  [45-93] 45  Resp:  [15-20] 18  BP: ()/(23-61) 106/36  SpO2:  [90 %-99 %] 94 %    Physical Exam  Constitutional:       General: He is not in acute distress.     Appearance: He is well-developed. He is not diaphoretic.   HENT:      Head: Normocephalic and atraumatic.      Right Ear: External ear normal.      Left Ear: External ear normal.      Mouth/Throat:      Pharynx: No oropharyngeal exudate or posterior oropharyngeal erythema.   Eyes:      Extraocular Movements: Extraocular movements intact.   Cardiovascular:      Rate and Rhythm: Normal rate.      Heart sounds: No gallop.    Pulmonary:      Effort: No respiratory distress.      Breath sounds: No wheezing.   Abdominal:      General: There is no distension.      Tenderness: There is no abdominal tenderness.   Musculoskeletal:         General: Tenderness present.      Comments: Right BKA  Left fourth toe with necrosis and tenderness to palpation   Skin:     General: Skin is warm.   Neurological:      Mental Status: He is alert and oriented to person, place, and time. Mental status is at baseline.      Motor: No weakness.   Psychiatric:         Mood and Affect: Mood normal.         Behavior: Behavior normal.          Fluids  No intake or output data in the 24 hours ending 05/27/21 1426    Laboratory  Recent Labs     05/26/21  1542 05/27/21  0005 05/27/21  0348   WBC 12.1* 13.7*  13.6* 13.4*   RBC 2.26* 2.27*  2.32* 2.42*   HEMOGLOBIN 7.3* 7.5*  7.6* 7.9*   HEMATOCRIT 23.2* 23.0*  23.3* 24.4*   .7* 101.3*  100.4* 100.8*   MCH 32.3 33.0  32.8 32.6   MCHC 31.5* 32.6*  32.6* 32.4*   RDW 69.0* 69.5*  67.7* 68.3*   PLATELETCT 219 209  218 233   MPV 9.9 10.1  10.1 10.3     Recent Labs     05/25/21  0318 05/26/21  0428 05/27/21  0005   SODIUM 133* 133* 133*   POTASSIUM 5.0 4.5 4.7   CHLORIDE 94* 97 95*   CO2 23 27 26   GLUCOSE 112* 116* 105*   BUN 44* 29* 37*   CREATININE 8.37* 5.26* 6.48*   CALCIUM 9.2 8.7 9.0     Recent Labs     05/25/21  0944   INR 1.28*               Imaging  US-EXTREMITY ARTERY LOWER BILAT   Final Result      US-PATSY SINGLE LEVEL BILAT   Final Result      DX-FOOT-2- LEFT   Final Result      1.  Soft tissue swelling of LEFT 4th toe.   2.  No gross bony destruction however osteomyelitis is not excluded by plain film.   3.  Prior partial amputation of great toe.      DX-CHEST-PORTABLE (1 VIEW)   Final Result      Left lower lobe pleural thickening and calcification may be related to sequelae of prior infection or trauma.      Patchy left basilar opacity may represent atelectasis or pneumonitis.      Mild cardiomegaly.      Atherosclerotic plaque.              Assessment/Plan  * Cellulitis of left foot- (present on admission)  Assessment & Plan  One dose of vancomycin given in the ER  On 5/22, foot wound culture grew ESBL  Wound culture growing Klebsiella pneumonia ESBL, but use me diabetes, and Staph aureus   Continue meropenem as per infectious disease recommendations  Orthopedic surgery following, planning for left BKA on a.m.  Labs in a.m.      Macrocytic anemia- (present on admission)  Assessment & Plan  Hemoglobin 6.5 today  No signs of GI bleed  Vit B12/folate unremarkable  Secondary to  anemia of chronic kidney disease  Transfuse 1 unit of PRBC  Nephrology following, recommending iron repletion  Continue erythropoietin as per nephrology    DNR (do not resuscitate)- (present on admission)  Assessment & Plan  Discussed with Mr. Sepulveda and he confirmed this status.    Ischemia of toe- (present on admission)  Assessment & Plan  Left 4th toe ischemia  Limb preservation service consulted  He likely will need amputation  Hold Eliquis for possible surgery.  PATSY abnormal.  Ultrasound severe arterial occlusion  Vascular surgery consulted by LPS  No intervention as per vascular surgery team  Surgery recommending to hold off on left BKA for now, patient high risk    Advance care planning- (present on admission)  Assessment & Plan  In a previous visit, he had indicated to palliative that he would consider hospice/comfort care  Currently, vascular recommend surgery and patient is agreeable  Consulted palliative care for further goals of care discussion after surgery    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)- (present on admission)  Assessment & Plan  Right BKA and left great toe amputation    Cardiomyopathy, ischemic- (present on admission)  Assessment & Plan  EF from heart cath 5/10 15-20%    Peripheral vascular disease (HCC)- (present on admission)  Assessment & Plan  Hx of multiple amputations     Hypotension- (present on admission)  Assessment & Plan  Chronic condition  Discontinue coreg  Continue home midodrine  IV bolus  Concerning for sepsis versus cardiogenic shock    Paroxysmal A-fib (HCC)- (present on admission)  Assessment & Plan  Chronic condition  Hold Eliquis in case he needs surgery and utilize SQ heparin for DVT prophylaxis    End stage renal disease on dialysis (HCC)- (present on admission)  Assessment & Plan  Dialysis via left arm fistula T,Th,S  Dr. Hodges, nephrology was consulted from the ER    Type 2 diabetes mellitus with kidney complication, with long-term  current use of insulin (HCC)- (present on admission)  Assessment & Plan  With hyperglycemia  Continue Lantus and sliding scale  Diabetic diet    CAD (coronary artery disease)- (present on admission)  Assessment & Plan  With hx of stents followed by [facility]    Hyponatremia- (present on admission)  Assessment & Plan  Mild, continue to monitor       VTE prophylaxis: SCDs, or in a.m.

## 2021-05-27 NOTE — PROGRESS NOTES
"[facility] Progress Note     Hemodialysis treatment x 3 hours performed per Dr. SKIP Mahajan  Treatment started at 1109 and ended at 1410.  Patient with soft BPs pre-tx with SBPs at 90s and DBPs at 20s-30s, patient denies s/s of hypotension. Midodrine given by primary care nurse pre-treatment.   Patient reports moderate to severe pain to LLE and feeling uncomfortable pre-treatment, pt states, \" I am not feeling good.\"  Primary care RN notified, medicated patient per MAR. Patient requested this dialysis RN to wait until he feels better prior to starting treatment.     Patient tolerated treatment well wth low BPs. SBPs 90s-100s, DBPs 20s-40s. Patient denies s/s. UF limited by low BPs. NS bolus given PRN for SBP <90 per orders. (see Acute Hemodialysis Flow Sheet for details)     Total Net UF Removed:  0 mL (per nephrologist's orders)    Post treatment: Cannulation needles removed from LUE AVF access sites, hemostasis established on each insertion site; verified no bleeding. (+) bruit/thrill post treatment. Covered with clean gauze dressings and secured with tape.     Please monitor for AVF access bleeding.  Notify Dialysis and Nephrologist for follow-up.     Report given to primary care nurse GORDON Dean RN.  "

## 2021-05-28 NOTE — PROGRESS NOTES
[facility] Nephrology Daily Progress Note    Date of Service  5/28/2021    Author: Marvin Mahajan M.D.    Chief Complaint  74 y/o man with ESRD HD T,TH,Sat at [place] presented with hypotension and inability to HD. On routine labs noted to have hyperkalemia.  Pt has increased erythema of 4th toe on left with some spreading erythema. Pt reports that this is worsening over the last few weeks, but though it secondary to trauma.     No F/C/N/V/CP/SOB.  No melena, hematochezia, hematemesis.  No HA, visual changes, or abdominal pain.    Daily Nephrology Summary:   5/20 - Consult done  5/21 - sitting up in bed, feeling good, appetite good, mild non-productive cough, tolerated HD yesterday UF: 158ml  5/22 - seen on HD, some hypotension this am, limited UF with HD, vascular to consult   5/23 - sitting up in bed, c/o severe pain in foot and L hand, vascular saw pt and plan poss intervention   5/24 - NAEO, feels good, having issues with dropping objects recently over the past 3-4 days  5/25 - NAEO, no complaints, sleeping comfortably  5/26 - NAEO, no complaints  5/27 - NAEO, no complaints, feels good  5/28 - NAEO, no complaints, scheduled for BKA today, family at bedside    Review of Systems  Review of Systems   Constitutional: Negative for chills and fever.   Respiratory: Negative for cough and shortness of breath.    Cardiovascular: Negative for chest pain and leg swelling.   Gastrointestinal: Negative for nausea and vomiting.   Neurological: Negative for seizures and loss of consciousness.   All other systems reviewed and are negative.    Physical Exam  Temp:  [36.7 °C (98 °F)-38 °C (100.4 °F)] 38 °C (100.4 °F)  Pulse:  [45-90] 82  Resp:  [15-20] 16  BP: ()/(23-85) 97/27  SpO2:  [94 %-100 %] 96 %    Physical Exam  Vitals and nursing note reviewed.   Constitutional:       Appearance: Normal appearance.   HENT:      Head: Normocephalic and atraumatic.   Eyes:      General: No scleral icterus.        Right eye:  No discharge.         Left eye: No discharge.   Cardiovascular:      Rate and Rhythm: Normal rate and regular rhythm.   Pulmonary:      Effort: Pulmonary effort is normal.      Breath sounds: Normal breath sounds.   Abdominal:      General: Bowel sounds are normal.      Palpations: Abdomen is soft.   Musculoskeletal:         General: Deformity (Necrotic 3rd toe on left foot) present. No tenderness.      Cervical back: Neck supple. No muscular tenderness.      Right lower leg: No edema.      Left lower leg: No edema.   Neurological:      Mental Status: He is alert. Mental status is at baseline.     Fluids    Intake/Output Summary (Last 24 hours) at 5/28/2021 1005  Last data filed at 5/27/2021 1928  Gross per 24 hour   Intake 1100 ml   Output 1000 ml   Net 100 ml     Laboratory  Recent Labs     05/27/21  1524 05/27/21  2311 05/28/21  0637   WBC 14.0* 15.6* 14.1*   RBC 2.48* 2.68* 2.54*   HEMOGLOBIN 8.2* 8.8* 8.3*   HEMATOCRIT 25.0* 26.7* 25.6*   .8* 99.6* 100.8*   MCH 33.1* 32.8 32.7   MCHC 32.8* 33.0* 32.4*   RDW 66.3* 63.8* 63.7*   PLATELETCT 268 284 288   MPV 10.0 10.0 9.8     Recent Labs     05/26/21  0428 05/27/21  0005 05/28/21  0637   SODIUM 133* 133* 134*   POTASSIUM 4.5 4.7 4.1   CHLORIDE 97 95* 95*   CO2 27 26 27   GLUCOSE 116* 105* 101*   BUN 29* 37* 27*   CREATININE 5.26* 6.48* 4.97*   CALCIUM 8.7 9.0 8.7         No results for input(s): NTPROBNP in the last 72 hours.        Imaging  - Reviewed    Assessment  # ESRD   maint HD q  T/T/S via   AVF. Left arm   # Hypotension: cardiogenic vs toe/cellultis   midodrine  # PVD with left 4th great toe discoloration   Per vascular    Non-healing and scheduled for BKA today (5/28)  # Cardiomyopathy: acute worsening. EF now 15-20%  # 80% ostial LAD in-stent restenosis:   # Steal syndrome: s/p DRI. Worsening apparent ischmia in setting of heart failure  # Anemia in CKD   Iron replete    ARMANDO   # Atrial fibrillation   plavix    BB  # Diabetes mellitus  #  Hyperthyroidism   Tapazole  # Hyponatremia, resolved    UF with HD   # Hyperkalemia, corrected    Manage with HD   # Hyperphosphatemia   sevelamer TID with meals   # BMD-CKD   Calcitriol   Sevelamer     PLAN:  - TTS iHD  - UF with HD as tolerated   - DC IVFs, okay to bolus PRN symptomatic hypotension  - Continue binders  - Dose all meds per ESRD

## 2021-05-28 NOTE — OR NURSING
"1159 pt received from OR via bed. Report given by anesthesia. Awake. 4L NC. Even non labored breathing. SR with frequent PVC, K+ 4.1. Denies pain and nausea. PERRLA. LLE surgical site, dressing cdi, 2+ L femoral, pink dry and warm. CMS intact. LLE elevated on pillow. Hemovac to compression.       1209 tolerates sips of water. Given pain med per mar in anticipation for pacu discharge     1223 5/10 pain, given pain med per mar    1233 , awake, even non labored breathing. LLE dressing cdi.     1238 c/o \"phantom pain\" given pain med per mar. Denies nausea.     1248 updated family, shayy, wife. Plan to transfer to [facility], bed 1. O2 tank at 75% full. Personal belongings in [facility].     1254 awake, follows commands, denies pain and nausea.     1307 Report given to Brianda Oconnor. Transferred to floor via bed by 2 RN. 5L O2 nc. VSS          "

## 2021-05-28 NOTE — ANESTHESIA POSTPROCEDURE EVALUATION
Patient: Luis Sepulveda    Procedure Summary     Date: 05/28/21 Room / Location: [place] [id number] / SURGERY [facility]    Anesthesia Start: 1040 Anesthesia Stop:     Procedure: AMPUTATION, BELOW KNEE. (Left Leg Lower) Diagnosis: (PERIPHERAL ARTERY DISEASE)    Surgeons: Jarett Márquez M.D. Responsible Provider: Miguelangel Velazquez M.D.    Anesthesia Type: general, peripheral nerve block ASA Status: 4          Final Anesthesia Type: general, peripheral nerve block  Last vitals  BP   Blood Pressure : (!) 97/27    Temp   38 °C (100.4 °F)    Pulse   82   Resp   16    SpO2   96 %      Anesthesia Post Evaluation    Patient location during evaluation: PACU  Patient participation: complete - patient participated  Level of consciousness: awake and lethargic  Pain score: 0    Airway patency: patent  Anesthetic complications: no  Cardiovascular status: hemodynamically stable  Respiratory status: acceptable and nasal cannula  Hydration status: euvolemic    PONV: none          No complications documented.     Nurse Pain Score: 10 (NPRS)

## 2021-05-28 NOTE — PROGRESS NOTES
0655 Patient's in bed. Bedside report received from SIERRA Gambino RN at the beginning of the shift.      0730 Patient's sitting up in bed. Educated on the importance/use of IS at least 10x every hour while awake, able to reach 1000. Fall protocol in effect. Call light within reach. Reminded patient to call for assist. Assessment completed. No distress noted. Plan of care reviewed with the patient. Verbalized understanding. Isolation precaution observed.      0925 Patient left for surgery via bed accompanied by one female Transport and spouse.    1301 Report received from Caroline PACU RN.    1317 Patient came back from PACU via bed accompanied by Caroline PACU RN and one female tech.    1325 Patient's in bed. Rates LLE pain 2/10, tolerable per patient.     1515 Patient's in bed. Spouse visiting. No distress noted.     1735 Patient's in bed. No distress noted.     1809 Medicated with Tramadol (see MAR) for c/o's Left leg pain, rates pain 6/10.    1900  Patient's in bed. No changes in status. Bedside report given to Oncoming SIERRA RN (Maki).

## 2021-05-28 NOTE — OP REPORT
POST-OP NOTE FOR LIMB PRESERVATION SERVICE    SURGERY DATE: 5/28/2021    PROCEDURE: Procedure(s):  AMPUTATION, BELOW KNEE. - Wound Class: Dirty or Infected    WEIGHT BEARING STATUS: Non Weight bearing    PT CONSULT: Yes    ANTIBIOTICS: Per ID recommendation    PLAN TO RETURN TO O.R.: No    WOUND CARE PLAN: Incisional dressing - Change POD #2 (Directions: Adaptic over incision, Gauze, Roll Gauze, Ace Wrap)    FOLLOW-UP: with me in clinic in 2 weeks    DURABLE MEDICAL EQUIPMENT: None    OTHER: n/a      Jarett Márquez M.D.

## 2021-05-28 NOTE — OP REPORT
DATE OF SERVICE:  05/28/2021     SERVICE:   Orthopedic surgery.     SURGEON:  Jarett Márquez MD     ASSISTANT:    Jonathan Middleton, first assist student     PREOPERATIVE DIAGNOSES:  1.  Left lower extremity peripheral arterial disease.  2.  End-stage renal disease, on dialysis.  3.  Prediabetes.     POSTOPERATIVE DIAGNOSES:  1.  Left lower extremity peripheral arterial disease.  2.  End-stage renal disease, on dialysis.  3.  Prediabetes.     PROCEDURES PERFORMED:  Left below-knee amputation.     COMPLICATIONS:  None.     SPECIMENS:  Left nonviable foot.     ESTIMATED BLOOD LOSS:  100 mL     TOURNIQUET TIME:  19 minutes.     INDICATIONS FOR PROCEDURE:  The patient is a very pleasant 75-year-old male   who has the above stated diagnoses.  He was seen and evaluated by vascular   surgery, found to have no distal targets for revascularization, was not a   candidate for revascularization in the setting of gangrenous toes.  Given his   pain as well as decreased function over the course of the last several months,   we discussed treatment options up to and including below-knee amputation.  He   and his family elected for a below-knee amputation after a lengthy discussion   regarding the postoperative recovery, which would likely result in bed to   wheelchair transfer.  Risks of procedure were discussed with the patient,   which include but not limited to bleeding, infection, damage to surrounding   nerves, tendons, ligaments, other structures, risk of need for future revision   surgery, continued pain, unsightly scar, dissatisfaction with outcome, DVT,   stroke, myocardial infarction and even death.  Benefits include improved pain   control and improved overall functional outcome.  The patient wished to   proceed and surgical consent forms were signed.     DESCRIPTION OF PROCEDURE:  On the day of surgery, the patient was met in the   preoperative area.  The operative extremity was identified by myself and   confirmed  by the patient, marked with my initials.  He was then brought back   to the operating room and placed supine on the operating table.  All bony   prominences were padded.  Laryngeal mask airway anesthesia was undertaken   without incident.  Nonsterile thigh tourniquet was placed on the left thigh.    Left lower extremity was then prepped and draped in the usual sterile fashion.    He was continued on his meropenem, which he was receiving on the floor.    Multidisciplinary timeout was performed confirming the correct site, correct   patient, and correct procedure.  Once all were in agreement, we placed Ioban   over the distal portion of the foot and ankle in order to seal this off from   the remainder of the wound.  We then jr out our BKA amputation incision   starting approximately 10-14 cm distal to the tibial tubercle.  Full thickness   flaps were then created with a knife, the posterior flap was created.  We   then exposed the tibia and the fibula.  Anterior compartment was then incised.    A Pratt elevator was utilized to elevate the periosteum of the tibia and the   fibula.  The fibula was cut first at higher level of the tibia.  This was then   followed by cutting of the tibia.  Amputation knife was then utilized to   complete the amputation along the posterior aspect of the tibia and fibula.    Soft tissues and muscle and skin were then debulked as necessary.  Vascular   bundles were then tied off with a 2-0 silk stick tie followed by 2-0 silk free   tie.  Sciatic nerve was then identified and tied off with a 2-0 Vicryl suture   and allowed to retract into the soft tissues after cutting. Sural, saphenous,   deep and superficial peroneal nerve was identified, pulled down into the   wound, cut and allowed to retract into the soft tissues.  The tourniquet was   then let down.  Hemostasis was achieved.  A deep drain was placed.  Posterior   fascia was then closed to the anterior periosteum and fascia with 0  Vicryl   suture.  This was then followed by 2-0 Vicryl suture for the dermal layer and   2-0 nylon suture for skin.  The drain was hooked up to suction.  Sterile   dressings were applied and a gently compressive dressing was applied.  The   patient was awoken from anesthesia and brought to the postoperative gurney to   PACU in stable condition.     POSTOPERATIVE PLAN:  He will be nonweightbearing on the left lower extremity.    He may transfer on the right lower extremity, has BKA prosthesis stump.  His   drain will remain in until postoperative day #2.  We will continue to follow   his clinical course.        ______________________________  MD HARMONY Barker/SALUD    DD:  05/28/2021 12:08  DT:  05/28/2021 12:51    Job#:  [id number]

## 2021-05-28 NOTE — PROGRESS NOTES
The pt was complaining of heart burning and indigestion. This RN paged Dr. Robert Solis to get an order for 2 500 mg Tums for the patient to which the doctor approved the order.

## 2021-05-28 NOTE — PROGRESS NOTES
Pt is still having severe neuropathic pain in his hands and left foot, despite having been given the tramadol at 2200 and the oxycodone 5 mg at 2318. The on call hospitalist was paged, which was Dr. Knight and was made aware of the situation. He said he would order a one time dose of the gabapentin 300 mg oral.

## 2021-05-28 NOTE — CARE PLAN
The patient is Stable - Low risk of patient condition declining or worsening    Shift Goals  Clinical Goals: Increase BP, hemoglobin level   Patient Goals: Increase BP, Pain control    Progress made toward(s) clinical / shift goals:    Problem: Skin Integrity  Goal: Skin integrity is maintained or improved  Outcome: Progressing   Pt is being turned more often and is on a low air loss mattress    Problem: Diabetes Management  Goal: Patient will achieve and maintain glucose in satisfactory range  Outcome: Progressing   The pt's blood glucose levels are staying within range without the use of antidiabetic meds.     Problem: Respiratory  Goal: Patient will achieve/maintain optimum respiratory ventilation and gas exchange  Outcome: Progressing   Pt oxygen saturations are improving and he is not having as much congestion.   Problem: Hemodynamics  Goal: Patient's hemodynamics, fluid balance and neurologic status will be stable or improve  Outcome: Progressing   The pt's blood pressures are improving, his hemoglobin has increased, and his other blood counts are improving     Patient is not progressing towards the following goals:

## 2021-05-28 NOTE — PROGRESS NOTES
Patient seen and evaluated at bedside  All questions answered, all family questions answered  Lengthy discussion today and last night with family regarding goals of care and the desire for BKA to improve pain control for palliative measure  The patient and family wish to proceed  Left BKA today  NPO      Jarett Márquez MD

## 2021-05-28 NOTE — CARE PLAN
The patient is Watcher - Medium risk of patient condition declining or worsening    Shift Goals  Clinical Goals: Increase Blood pressure and  hemoglobin level   Patient Goals: Increase Blood pressure and Pain control    Progress made toward(s) clinical / shift goals:    Problem: Knowledge Deficit - Standard  Goal: Patient and family/care givers will demonstrate understanding of plan of care, disease process/condition, diagnostic tests and medications.  NPO sips with medications for surgery. Surgery today.   Outcome: Progressing   The pt is educated and updated to the changes in his plan of care and treatments and answers all the questions he has   Problem: Fall Risk  Goal: Patient will remain free from falls  Outcome: Progressing   The pt calls appropriately for assistance  Problem: Skin Integrity  Goal: Skin integrity is maintained or improved  Outcome: Not Progressing    Q 2 hour turn. On Low airloss mattress. TAPS in use also..      Patient is not progressing towards the following goals:

## 2021-05-28 NOTE — ANESTHESIA TIME REPORT
Anesthesia Start and Stop Event Times    No anesthesia events filed       Responsible Staff    No responsible staff documented.       Preop Diagnosis (Free Text):  Pre-op Diagnosis             Preop Diagnosis (Codes):    Post op Diagnosis  Ischemia of left lower extremity      Premium Reason  Non-Premium    Comments:

## 2021-05-28 NOTE — ANESTHESIA PREPROCEDURE EVALUATION
Relevant Problems   PULMONARY   (positive) Shortness of breath      CARDIAC   (positive) Acute on chronic systolic congestive heart failure (HCC)   (positive) Atrial fibrillation with RVR ([facility])   (positive) CAD (coronary artery disease)   (positive) Ischemia of toe   (positive) Left anterior fascicular block   (positive) Paroxysmal A-fib ([facility])   (positive) Right bundle branch block      GI   (positive) Gastroesophageal reflux disease         (positive) End stage renal disease on dialysis (HCC)   (positive) Hepatic cyst   (positive) Renal cyst      ENDO   (positive) Hyperthyroidism   (positive) Type 2 diabetes mellitus with kidney complication, with long-term current use of insulin (HCC)       Physical Exam    Airway   Mallampati: II  TM distance: <3 FB  Neck ROM: full       Cardiovascular - normal exam  Rhythm: regular  Rate: normal  (-) murmur     Dental - normal exam  (+) upper dentures      Very poor dentition   Pulmonary - normal exam  Breath sounds clear to auscultation     Abdominal   (+) obese     Neurological - normal exam                 Anesthesia Plan    ASA 4   ASA physical status 4 criteria: severe reduction of ejection fractions    Plan - general and peripheral nerve block     Peripheral nerve block will be post-op pain control  Airway plan will be LMA          Induction: intravenous    Postoperative Plan: Postoperative administration of opioids is intended.    Pertinent diagnostic labs and testing reviewed    Informed Consent:    Anesthetic plan and risks discussed with patient.    Use of blood products discussed with: patient whom consented to blood products.

## 2021-05-28 NOTE — PROGRESS NOTES
The pt is having sever pain in his left foot, left leg and in his hands. The pt received a 50 mg tramadol and 1000 mg of acetaminophen at 2145. Dr. Kauffman was paged and made aware of the situation and said he would order some more medications.

## 2021-05-28 NOTE — PROGRESS NOTES
Hospital Medicine Daily Progress Note    Date of Service  5/28/2021    Chief Complaint  75 y.o. male admitted 5/20/2021 with weakness and dizziness    Hospital Course  75 y.o. male who presented 5/20/2021 with left foot pain  Mr. Sepulveda has a past medical history of end-stage renal disease on dialysis, peripheral vascular disease status post right below the knee amputation, insulin-dependent diabetes mellitus, there was most recently admitted here from 5/10/2021 through 5/18/2021 with shortness of breath.  During that hospitalization he underwent heart catheterization revealing ejection fraction of 15 to 20% with 80% ostial LAD in-stent restenosis and distal RCA disease.  He presents back today with 4 to 5 days of left foot pain especially when walking with his walker.  He has been compliant with dialysis and went on Tuesday.  He was admitted for cellulitis of the left foot with what appears to be an ischemic left fourth toe.  He has been on Eliquis which was be held in case he requires amputation.  Upon admission, he was seen by LPS.  ID saw him as well and DC'd his antibiotics as he was not septic.  Palliative care saw him and patient does want surgery for now.  Vascular is evaluating him to see if he is a candidate for surgery.      5/21: PATSY done and arterial ultrasound pending.  Patient feels okay with no fevers or chills so far.  5/22: Find of some loose stools.  Culture started growing ESBL.  Consulted ID Dr. Love.  5/23: Hgb did decrease to 7.1 but stable at 7.5 on repeat.  I have asked and heparin were appropriately held overnight, now resuming.   5/24: Seen by palliative.  Patient does want surgery for now.  Updated vascular.    Interval Problem Update  Patient evaluated bedside and reporting left foot pain  BP better today, patient more awake and in no acute distress  Hb 7.9 today  WBC trending down slowly  Wound culture growing Klebsiella pneumonia ESBL, but use me diabetes, and Staph aureus    Continue meropenem as per infectious disease recommendations  Orthopedic surgery following, patient for left BKA today  Labs in a.m.    Disposition  Left BKA    Review of Systems  Review of Systems   Constitutional: Negative for fever.   Respiratory: Negative for cough, shortness of breath and wheezing.    Cardiovascular: Negative for chest pain.   Gastrointestinal: Positive for diarrhea (loose Stools). Negative for abdominal pain, constipation, nausea and vomiting.   Genitourinary: Negative for dysuria.   Musculoskeletal: Positive for joint pain.   Neurological: Negative for headaches.   Psychiatric/Behavioral: The patient is nervous/anxious.         Physical Exam  Temp:  [36.7 °C (98 °F)-37.7 °C (99.8 °F)] 37.4 °C (99.3 °F)  Pulse:  [45-90] 83  Resp:  [15-20] 15  BP: ()/(23-85) 103/43  SpO2:  [94 %-100 %] 96 %    Physical Exam  Constitutional:       General: He is not in acute distress.     Appearance: He is well-developed. He is not diaphoretic.   HENT:      Head: Normocephalic and atraumatic.      Right Ear: External ear normal.      Left Ear: External ear normal.      Mouth/Throat:      Pharynx: No oropharyngeal exudate or posterior oropharyngeal erythema.   Eyes:      Extraocular Movements: Extraocular movements intact.   Cardiovascular:      Rate and Rhythm: Normal rate.      Heart sounds: No gallop.    Pulmonary:      Effort: No respiratory distress.      Breath sounds: No wheezing.   Abdominal:      General: There is no distension.      Tenderness: There is no abdominal tenderness.   Musculoskeletal:         General: Tenderness present.      Comments: Right BKA  Left fourth toe with necrosis and tenderness to palpation   Skin:     General: Skin is warm.   Neurological:      Mental Status: He is alert and oriented to person, place, and time. Mental status is at baseline.      Motor: No weakness.   Psychiatric:         Mood and Affect: Mood normal.         Behavior: Behavior normal.          Fluids    Intake/Output Summary (Last 24 hours) at 5/28/2021 0721  Last data filed at 5/27/2021 1928  Gross per 24 hour   Intake 1100 ml   Output 1000 ml   Net 100 ml       Laboratory  Recent Labs     05/27/21  0348 05/27/21  1524 05/27/21  2311   WBC 13.4* 14.0* 15.6*   RBC 2.42* 2.48* 2.68*   HEMOGLOBIN 7.9* 8.2* 8.8*   HEMATOCRIT 24.4* 25.0* 26.7*   .8* 100.8* 99.6*   MCH 32.6 33.1* 32.8   MCHC 32.4* 32.8* 33.0*   RDW 68.3* 66.3* 63.8*   PLATELETCT 233 268 284   MPV 10.3 10.0 10.0     Recent Labs     05/26/21  0428 05/27/21  0005   SODIUM 133* 133*   POTASSIUM 4.5 4.7   CHLORIDE 97 95*   CO2 27 26   GLUCOSE 116* 105*   BUN 29* 37*   CREATININE 5.26* 6.48*   CALCIUM 8.7 9.0     Recent Labs     05/25/21  0944   INR 1.28*               Imaging  US-EXTREMITY ARTERY LOWER BILAT   Final Result      US-PATSY SINGLE LEVEL BILAT   Final Result      DX-FOOT-2- LEFT   Final Result      1.  Soft tissue swelling of LEFT 4th toe.   2.  No gross bony destruction however osteomyelitis is not excluded by plain film.   3.  Prior partial amputation of great toe.      DX-CHEST-PORTABLE (1 VIEW)   Final Result      Left lower lobe pleural thickening and calcification may be related to sequelae of prior infection or trauma.      Patchy left basilar opacity may represent atelectasis or pneumonitis.      Mild cardiomegaly.      Atherosclerotic plaque.              Assessment/Plan  * Cellulitis of left foot- (present on admission)  Assessment & Plan  One dose of vancomycin given in the ER  On 5/22, foot wound culture grew ESBL  Wound culture growing Klebsiella pneumonia ESBL, but use me diabetes, and Staph aureus   Continue meropenem as per infectious disease recommendations  Orthopedic surgery following, planning for left BKA on a.m.  Labs in a.m.      Macrocytic anemia- (present on admission)  Assessment & Plan  Hemoglobin 6.5 today  No signs of GI bleed  Vit B12/folate unremarkable  Secondary to anemia of chronic kidney  disease  Transfuse 1 unit of PRBC  Nephrology following, recommending iron repletion  Continue erythropoietin as per nephrology    DNR (do not resuscitate)- (present on admission)  Assessment & Plan  Discussed with Mr. Sepulveda and he confirmed this status.    Ischemia of toe- (present on admission)  Assessment & Plan  Left 4th toe ischemia  Limb preservation service consulted  He likely will need amputation  Hold Eliquis for possible surgery.  PATSY abnormal.  Ultrasound severe arterial occlusion  Vascular surgery consulted by LPS  No intervention as per vascular surgery team  Patient for left BKA today    Advance care planning- (present on admission)  Assessment & Plan  In a previous visit, he had indicated to palliative that he would consider hospice/comfort care  Currently, vascular recommend surgery and patient is agreeable  Consulted palliative care for further goals of care discussion after surgery    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)- (present on admission)  Assessment & Plan  Right BKA and left great toe amputation    Cardiomyopathy, ischemic- (present on admission)  Assessment & Plan  EF from heart cath 5/10 15-20%    Peripheral vascular disease (HCC)- (present on admission)  Assessment & Plan  Hx of multiple amputations     Hypotension- (present on admission)  Assessment & Plan  Chronic condition  Discontinue coreg  Continue home midodrine  IV bolus  Concerning for sepsis versus cardiogenic shock    Paroxysmal A-fib ([facility])- (present on admission)  Assessment & Plan  Chronic condition  Hold Eliquis in case he needs surgery and utilize SQ heparin for DVT prophylaxis    End stage renal disease on dialysis ([facility])- (present on admission)  Assessment & Plan  Dialysis via left arm fistula T,Th,S  Dr. Hodges, nephrology was consulted from the ER    Type 2 diabetes mellitus with kidney complication, with long-term current use of insulin ([facility])- (present on admission)  Assessment &  Plan  With hyperglycemia  Continue Lantus and sliding scale  Diabetic diet    CAD (coronary artery disease)- (present on admission)  Assessment & Plan  With [facility]    Hyponatremia- (present on admission)  Assessment & Plan  Mild, continue to monitor       VTE prophylaxis: SCDs, or in a.m.

## 2021-05-28 NOTE — ANESTHESIA PROCEDURE NOTES
Airway    Date/Time: 5/28/2021 10:49 AM  Performed by: Miguelangel Velazquez M.D.  Authorized by: Miguelangel Velazquez M.D.     Location:  OR  Urgency:  Elective  Difficult Airway: No    Indications for Airway Management:  Anesthesia      Spontaneous Ventilation: absent    Sedation Level:  Deep  Preoxygenated: Yes    Mask Difficulty Assessment:  2 - vent by mask + OA or adjuvant +/- NMBA  Final Airway Type:  Supraglottic airway  Final Supraglottic Airway:  Standard LMA    SGA Size:  4  Number of Attempts at Approach:  1   LMA5 poor fit

## 2021-05-28 NOTE — ANESTHESIA TIME REPORT
Anesthesia Start and Stop Event Times     Date Time Event    5/28/2021 1015 Ready for Procedure     1040 Anesthesia Start     1204 Anesthesia Stop        Responsible Staff  05/28/21    Name Role Begin End    August W TK Velazquez Anesth 1040 1204        Preop Diagnosis (Free Text):  Pre-op Diagnosis     PERIPHERAL ARTERY DISEASE        Preop Diagnosis (Codes):    Post op Diagnosis  Ischemia of left lower extremity      Premium Reason  Non-Premium    Comments:

## 2021-05-28 NOTE — PROGRESS NOTES
2 RN skin check completed with JAN Herrera    Noted dressing to GUI, CD&I.   Devices in place Oxygen (nasal cannula), , hemovac..  Skin assessed under devices yes.  Confirmed pressure ulcers found on coccyx (already existing).  New potential pressure ulcers noted on none. Wound consult placed wound following..  The following interventions in place q 2 hour turn in effect. On low airloss mattress, TAPS in use.

## 2021-05-29 PROBLEM — Z89.512 S/P BKA (BELOW KNEE AMPUTATION) UNILATERAL, LEFT (HCC): Status: ACTIVE | Noted: 2021-01-01

## 2021-05-29 NOTE — THERAPY
Missed Therapy     Patient Name: Luis Sepulveda  Age:  75 y.o., Sex:  male  Medical Record #: [id number]  Today's Date: 5/29/2021    Discussed missed therapy with nursing       05/29/21 1045   Total Time Spent   Total Time Spent (Mins) 15   Initial Contact Note    Initial Contact Note Order Received and Verified, Occupational Therapy Evaluation in Progress with Full Report to Follow.   Interdisciplinary Plan of Care Collaboration   IDT Collaboration with  Nursing   Collaboration Comments unable to complete eval, patient in dialysis   Session Information   Date / Session Number  5/29 attempt- needs eval

## 2021-05-29 NOTE — CARE PLAN
The patient is Watcher - Medium risk of patient condition declining or worsening    Shift Goals  Clinical Goals: Increase Blood pressure and hemoglobin level  Patient Goals: Increase Blood pressure and pain control    Progress made toward(s) clinical / shift goals:    Problem: Knowledge Deficit - Standard  Goal: Patient and family/care givers will demonstrate understanding of plan of care, disease process/condition, diagnostic tests and medications.  Dialysis today.   Outcome: Progressing   The pt is educated and updated to the changes in his plan of care and treatments and answers all the questions he has   Problem: Fall Risk  Goal: Patient will remain free from falls  Outcome: Progressing   The pt calls appropriately for assistance  Problem: Skin Integrity  Goal: Skin integrity is maintained or improved  Outcome: Not Progressing    Q 2 hour turn. On Low airloss mattress. TAPS in use also..      Patient is not progressing towards the following goals:

## 2021-05-29 NOTE — PROGRESS NOTES
[facility] Nephrology Daily Progress Note    Date of Service  5/29/2021    Author: Lane Ortega M.D.    Chief Complaint  74 y/o man with ESRD HD T,TH,Sat at [place] presented with hypotension and inability to HD. On routine labs noted to have hyperkalemia.  Pt has increased erythema of 4th toe on left with some spreading erythema. Pt reports that this is worsening over the last few weeks, but though it secondary to trauma.     No F/C/N/V/CP/SOB.  No melena, hematochezia, hematemesis.  No HA, visual changes, or abdominal pain.    Daily Nephrology Summary:   5/20 - Consult done  5/21 - sitting up in bed, feeling good, appetite good, mild non-productive cough, tolerated HD yesterday UF: 158ml  5/22 - seen on HD, some hypotension this am, limited UF with HD, vascular to consult   5/23 - sitting up in bed, c/o severe pain in foot and L hand, vascular saw pt and plan poss intervention   5/24 - NAEO, feels good, having issues with dropping objects recently over the past 3-4 days  5/25 - NAEO, no complaints, sleeping comfortably  5/26 - NAEO, no complaints  5/27 - NAEO, no complaints, feels good  5/28 - NAEO, no complaints, scheduled for BKA today, family at bedside  5/29 - NAEO, no complaints today.  Per dialysis nurse clots on arterial line when initiating dialysis.  High arterial pressures.  5/30 - HD stopped early yesterday due to clotting, AVG U/S with patency    Review of Systems  Review of Systems   Constitutional: Negative for chills and fever.   Respiratory: Negative for cough and shortness of breath.    Cardiovascular: Negative for chest pain and leg swelling.   Gastrointestinal: Negative for nausea and vomiting.   Neurological: Negative for seizures and loss of consciousness.   All other systems reviewed and are negative.    Physical Exam  Temp:  [36.5 °C (97.7 °F)-37.2 °C (99 °F)] 37.1 °C (98.7 °F)  Pulse:  [75-89] 84  Resp:  [16-22] 17  BP: ()/(33-66) 113/33  SpO2:  [94 %-99 %] 97 %    Physical  Exam  Vitals and nursing note reviewed.   Constitutional:       Appearance: Normal appearance.   HENT:      Head: Normocephalic and atraumatic.   Eyes:      General: No scleral icterus.        Right eye: No discharge.         Left eye: No discharge.   Cardiovascular:      Rate and Rhythm: Normal rate and regular rhythm.   Pulmonary:      Effort: Pulmonary effort is normal.      Breath sounds: Normal breath sounds.   Abdominal:      General: Bowel sounds are normal.      Palpations: Abdomen is soft.   Musculoskeletal:         General: Deformity (Necrotic 3rd toe on left foot) present. No tenderness.      Cervical back: Neck supple. No muscular tenderness.      Right lower leg: No edema.      Left lower leg: No edema.   Neurological:      Mental Status: He is alert. Mental status is at baseline.     E AVG with b/t    Fluids    Intake/Output Summary (Last 24 hours) at 5/29/2021 1030  Last data filed at 5/29/2021 0400  Gross per 24 hour   Intake 540 ml   Output 150 ml   Net 390 ml     Laboratory  Recent Labs     05/27/21  2311 05/28/21  0637 05/29/21  0508   WBC 15.6* 14.1* 10.1   RBC 2.68* 2.54* 2.30*   HEMOGLOBIN 8.8* 8.3* 7.5*   HEMATOCRIT 26.7* 25.6* 23.6*   MCV 99.6* 100.8* 102.6*   MCH 32.8 32.7 32.6   MCHC 33.0* 32.4* 31.8*   RDW 63.8* 63.7* 63.5*   PLATELETCT 284 288 306   MPV 10.0 9.8 9.4     Recent Labs     05/27/21  0005 05/28/21  0637 05/29/21  0508   SODIUM 133* 134* 133*   POTASSIUM 4.7 4.1 4.9   CHLORIDE 95* 95* 97   CO2 26 27 25   GLUCOSE 105* 101* 121*   BUN 37* 27* 38*   CREATININE 6.48* 4.97* 6.16*   CALCIUM 9.0 8.7 8.7         No results for input(s): NTPROBNP in the last 72 hours.        Imaging  - Reviewed    Assessment  # ESRD   maint HD q  T/T/S via   AVG. Left arm    U/S was patent  # Hypotension: cardiogenic vs toe/cellultis   midodrine  # PVD with left 4th great toe discoloration   Per vascular    Non-healing and scheduled for BKA today (5/28)  # Cardiomyopathy: acute worsening. EF now  15-20%  # 80% ostial LAD in-stent restenosis:   # Steal syndrome: s/p DRI. Worsening apparent ischmia in setting of heart failure  # Anemia in CKD   Iron sat okay / ferritin elevated   ARMANDO   # Atrial fibrillation   plavix    BB  # Diabetes mellitus  # Hyperthyroidism   Tapazole  # Hyponatremia, resolved    UF with HD   # Hyperkalemia, corrected    Manage with HD   # Hyperphosphatemia   sevelamer TID with meals   # BMD-CKD   Calcitriol   Sevelamer     PLAN:  - Trial again of HD today today  - If any significant findings will need vascular consult  - TTS iHD  - UF with HD as tolerated   - Continue binders  - Dose all meds per ESRD

## 2021-05-29 NOTE — ASSESSMENT & PLAN NOTE
Status post left BKA on 5/28/2023 1  Scheduled and as needed pain mgmt  Orthopedic surgery following for post BKA care  Labs on AM

## 2021-05-29 NOTE — PROGRESS NOTES
2 RN skin check complete.   Devices in place nasal canula .  Skin assessed under devices yes.  Confirmed pressure ulcers found on none.  New potential pressure ulcers noted on coccyx. Wound consult placed previously found by day shift.  Pt's buttocks and sacrum are red but blanching, pt's left heel is red and blanching, ears are pink and elbows are pink.  The following interventions in place: the pt is being turned, he is on a low air loss mattress, silicone tubing, left ankle/ foot is being floated on a pillow.

## 2021-05-29 NOTE — THERAPY
Occupational Therapy   Initial Evaluation     Patient Name: Luis Sepulveda  Age:  75 y.o., Sex:  male  Medical Record #: [id number]  Today's Date: 5/29/2021     Precautions  Precautions: (P) Fall Risk  Comments: (P) B BKA    Assessment  Patient is 75 y.o. male with a diagnosis of new left BKA, existing right BKA.  Additional factors influencing patient status / progress: good family support at home, all DME currently in place. Will benefit from OT in house to focus on ADLS, transfers, activity tolerance. .      Plan    Recommend Occupational Therapy 3 times per week until therapy goals are met for the following treatments:  Adaptive Equipment, Self Care/Activities of Daily Living, Therapeutic Activities and Therapeutic Exercises.    DC Equipment Recommendations: (P) Unable to determine at this time (necessary DME appears to be currently in place)  Discharge Recommendations: (P) Recommend post-acute placement for additional occupational therapy services prior to discharge home     Subjective    Pleasant and cooperative. Motivated for independence/ return to community living     Objective       05/29/21 1445   Total Time Spent   Total Time Spent (Mins) 42   Charge Group   OT Evaluation OT Evaluation Mod   Initial Contact Note    Initial Contact Note Order Received and Verified, Occupational Therapy Evaluation in Progress with Full Report to Follow.   Prior Living Situation   Prior Services Home-Independent   Housing / Facility 1 Story House   Steps Into Home 0   Steps In Home 0   Bathroom Set up Bathtub / Shower Combination;Tub Transfer Bench   Equipment Owned Tub Transfer Bench;Front-Wheel Walker;Wheelchair   Lives with - Patient's Self Care Capacity Spouse;Adult Children  (spouse and son)   Prior Level of ADL Function   Self Feeding Independent   Grooming / Hygiene Independent   Bathing Independent   Dressing Independent   Toileting Independent   Prior Level of IADL Function   Medication Management Requires  Assist   Laundry Requires Assist   Kitchen Mobility Requires Assist   Finances Requires Assist   Home Management Requires Assist   Shopping Requires Assist   Prior Level Of Mobility Uses Wheel Chair for in Home Mobility;Supervision With Device in Home  (had just gotten prosthetic for right BKA)   Driving / Transportation Relatives / Others Provide Transportation   History of Falls   History of Falls No   Precautions   Precautions Fall Risk   Comments B BKA   Vitals   O2 (LPM) 4   O2 Delivery Device Nasal Cannula   Pain 0 - 10 Group   Therapist Pain Assessment During Activity;6;Post Activity Pain Same as Prior to Activity;Nurse Notified;4   Cognition    Cognition / Consciousness WDL   Level of Consciousness Alert   Passive ROM Upper Body   Passive ROM Upper Body WDL   Active ROM Upper Body   Active ROM Upper Body  WDL   Dominant Hand Right   Strength Upper Body   Upper Body Strength  WDL   Sensation Upper Body   Upper Extremity Sensation  WDL   Upper Body Muscle Tone   Upper Body Muscle Tone  WDL   Coordination Upper Body   Coordination WDL   Balance Assessment   Sitting Balance (Static) Fair   Sitting Balance (Dynamic) Fair -   Standing Balance (Static)   (NT)   Standing Balance (Dynamic)   (NT)   Weight Shift Sitting Fair   Bed Mobility    Supine to Sit Minimal Assist   Sit to Supine Minimal Assist   Scooting Minimal Assist   ADL Assessment   Eating Supervision   Grooming Supervision;Seated   Bathing   (NT)   Upper Body Dressing Moderate Assist   Lower Body Dressing Maximal Assist   Toileting Maximal Assist   How much help from another person does the patient currently need...   Putting on and taking off regular lower body clothing? 2   Bathing (including washing, rinsing, and drying)? 2   Toileting, which includes using a toilet, bedpan, or urinal? 2   Putting on and taking off regular upper body clothing? 3   Taking care of personal grooming such as brushing teeth? 3   Eating meals? 3   6 Clicks Daily Activity  Score 15   Functional Mobility   Sit to Stand Unable to Participate   Bed, Chair, Wheelchair Transfer Unable to Participate   Toilet Transfers Unable to Participate   Comments EOB only, fatigued quickly during sitting   Visual Perception   Visual Perception  WDL   Activity Tolerance   Sitting Edge of Bed 15 mins   Patient / Family Goals   Patient / Family Goal #1 get stronger/ go home   Short Term Goals   Short Term Goal # 1 SBA slide board transfer bed to commode/ wheelchair   Short Term Goal # 2 seated UB ADL with set up   Short Term Goal # 3 tolerate 15 minutes of continuous activity, prior to resting, wihtout HORTON   Short Term Goal # 4 tolerate STS using right prosthetic, wiht SBA   Education Group   Role of Occupational Therapist Patient Response Patient;Acceptance;Explanation;Demonstration;Verbal Demonstration;Reinforcement Needed   Problem List   Problem List Decreased Active Daily Living Skills;Decreased Functional Mobility;Decreased Activity Tolerance;Impaired Postural Control / Balance   Anticipated Discharge Equipment and Recommendations   DC Equipment Recommendations Unable to determine at this time  (necessary DME appears to be currently in place)   Discharge Recommendations Recommend post-acute placement for additional occupational therapy services prior to discharge home   Interdisciplinary Plan of Care Collaboration   IDT Collaboration with  Nursing;Physical Therapist   Patient Position at End of Therapy In Bed;Seated;Call Light within Reach;Tray Table within Reach;Phone within Reach;Bed Alarm On;Family / Friend in Room   Collaboration Comments spouse arrived at end of session, report given   Session Information   Date / Session Number  5/29,1 ( 1/3, 6/4)   Priority 3      05/29/21 3409   Total Time Spent   Total Time Spent (Mins)    Charge Group   OT Evaluation OT Evaluation Mod   Initial Contact Note    Initial Contact Note Order Received and Verified, Occupational Therapy Evaluation in Progress  with Full Report to Follow.   Prior Living Situation   Prior Services Home-Independent   Housing / Facility 1 Story House   Steps Into Home 0   Steps In Home 0   Bathroom Set up Bathtub / Shower Combination;Tub Transfer Bench   Equipment Owned Tub Transfer Bench;Front-Wheel Walker;Wheelchair   Lives with - Patient's Self Care Capacity Spouse;Adult Children  (spouse and son)   Prior Level of ADL Function   Self Feeding Independent   Grooming / Hygiene Independent   Bathing Independent   Dressing Independent   Toileting Independent   Prior Level of IADL Function   Medication Management Requires Assist   Laundry Requires Assist   Kitchen Mobility Requires Assist   Finances Requires Assist   Home Management Requires Assist   Shopping Requires Assist   Prior Level Of Mobility Uses Wheel Chair for in Home Mobility;Supervision With Device in Home  (had just gotten prosthetic for right BKA)   Driving / Transportation Relatives / Others Provide Transportation   History of Falls   History of Falls No   Precautions   Precautions Fall Risk   Comments B BKA   Vitals   O2 (LPM) 4   O2 Delivery Device Nasal Cannula   Pain 0 - 10 Group   Therapist Pain Assessment During Activity;6;Post Activity Pain Same as Prior to Activity;Nurse Notified;4   Cognition    Cognition / Consciousness WDL   Level of Consciousness Alert   Passive ROM Upper Body   Passive ROM Upper Body WDL   Active ROM Upper Body   Active ROM Upper Body  WDL   Dominant Hand Right   Strength Upper Body   Upper Body Strength  WDL   Sensation Upper Body   Upper Extremity Sensation  WDL   Upper Body Muscle Tone   Upper Body Muscle Tone  WDL   Coordination Upper Body   Coordination WDL   Balance Assessment   Sitting Balance (Static) Fair   Sitting Balance (Dynamic) Fair -   Standing Balance (Static)   (NT)   Standing Balance (Dynamic)   (NT)   Weight Shift Sitting Fair   Bed Mobility    Supine to Sit Minimal Assist   Sit to Supine Minimal Assist   Scooting Minimal Assist    ADL Assessment   Eating Supervision   Grooming Supervision;Seated   Bathing   (NT)   Upper Body Dressing Moderate Assist   Lower Body Dressing Maximal Assist   Toileting Maximal Assist   How much help from another person does the patient currently need...   Putting on and taking off regular lower body clothing? 2   Bathing (including washing, rinsing, and drying)? 2   Toileting, which includes using a toilet, bedpan, or urinal? 2   Putting on and taking off regular upper body clothing? 3   Taking care of personal grooming such as brushing teeth? 3   Eating meals? 3   6 Clicks Daily Activity Score 15   Functional Mobility   Sit to Stand Unable to Participate   Bed, Chair, Wheelchair Transfer Unable to Participate   Toilet Transfers Unable to Participate   Comments EOB only, fatigued quickly during sitting   Visual Perception   Visual Perception  WDL   Activity Tolerance   Sitting Edge of Bed 15 mins   Patient / Family Goals   Patient / Family Goal #1 get stronger/ go home   Short Term Goals   Short Term Goal # 1 SBA slide board transfer bed to commode/ wheelchair   Short Term Goal # 2 seated UB ADL with set up   Short Term Goal # 3 tolerate 15 minutes of continuous activity, prior to resting, wihtout HORTON   Short Term Goal # 4 tolerate STS using right prosthetic, wiht SBA   Education Group   Role of Occupational Therapist Patient Response Patient;Acceptance;Explanation;Demonstration;Verbal Demonstration;Reinforcement Needed   Problem List   Problem List Decreased Active Daily Living Skills;Decreased Functional Mobility;Decreased Activity Tolerance;Impaired Postural Control / Balance   Anticipated Discharge Equipment and Recommendations   DC Equipment Recommendations Unable to determine at this time  (necessary DME appears to be currently in place)   Discharge Recommendations Recommend post-acute placement for additional occupational therapy services prior to discharge home   Interdisciplinary Plan of Care  Collaboration   IDT Collaboration with  Nursing;Physical Therapist   Patient Position at End of Therapy In Bed;Seated;Call Light within Reach;Tray Table within Reach;Phone within Reach;Bed Alarm On;Family / Friend in Room   Collaboration Comments spouse arrived at end of session, report given   Session Information   Date / Session Number  5/29,1 ( 1/3, 6/4)   Priority 3

## 2021-05-29 NOTE — PROGRESS NOTES
Hospital Medicine Daily Progress Note    Date of Service  5/29/2021    Chief Complaint  75 y.o. male admitted 5/20/2021 with weakness and dizziness    Hospital Course  75 y.o. male who presented 5/20/2021 with left foot pain  Mr. Sepulveda has a past medical history of end-stage renal disease on dialysis, peripheral vascular disease status post right below the knee amputation, insulin-dependent diabetes mellitus, there was most recently admitted here from 5/10/2021 through 5/18/2021 with shortness of breath.  During that hospitalization he underwent heart catheterization revealing ejection fraction of 15 to 20% with 80% ostial LAD in-stent restenosis and distal RCA disease.  He presents back today with 4 to 5 days of left foot pain especially when walking with his walker.  He has been compliant with dialysis and went on Tuesday.  He was admitted for cellulitis of the left foot with what appears to be an ischemic left fourth toe.  He has been on Eliquis which was be held in case he requires amputation.  Upon admission, he was seen by LPS.  ID saw him as well and DC'd his antibiotics as he was not septic.  Palliative care saw him and patient does want surgery for now.  Vascular is evaluating him to see if he is a candidate for surgery.      5/21: PATSY done and arterial ultrasound pending.  Patient feels okay with no fevers or chills so far.  5/22: Find of some loose stools.  Culture started growing ESBL.  Consulted ID Dr. Love.  5/23: Hgb did decrease to 7.1 but stable at 7.5 on repeat.  I have asked and heparin were appropriately held overnight, now resuming.   5/24: Seen by palliative.  Patient does want surgery for now.  Updated vascular.    Interval Problem Update  S/P  left BKA on 5/28  Patient evaluated bedside and reporting surgical site pain  Patient for HD today  No leukocytosis today  Wound culture growing Klebsiella pneumonia ESBL, but use me diabetes, and Staph aureus   Continue meropenem as per  infectious disease recommendations  Nephrology following for hemodialysis, unable to complete dialysis, IVF clot suspected  AVF U/S showing dense calcific plaque observed in the brachial, radial & distal ulnar, the DRIL graft is patent with no stenosis observed  Labs in a.m.    Disposition  Left BKA    Review of Systems  Review of Systems   Constitutional: Negative for fever.   Respiratory: Negative for cough, shortness of breath and wheezing.    Cardiovascular: Negative for chest pain.   Gastrointestinal: Positive for diarrhea (loose Stools). Negative for abdominal pain, constipation, nausea and vomiting.   Genitourinary: Negative for dysuria.   Musculoskeletal: Positive for joint pain.   Neurological: Negative for headaches.   Psychiatric/Behavioral: The patient is nervous/anxious.         Physical Exam  Temp:  [36.5 °C (97.7 °F)-38 °C (100.4 °F)] 36.5 °C (97.7 °F)  Pulse:  [75-89] 83  Resp:  [16-22] 17  BP: ()/(27-66) 94/51  SpO2:  [94 %-99 %] 95 %    Physical Exam  Constitutional:       General: He is not in acute distress.     Appearance: He is well-developed. He is obese. He is not diaphoretic.   HENT:      Head: Normocephalic and atraumatic.      Right Ear: External ear normal.      Left Ear: External ear normal.      Mouth/Throat:      Pharynx: No oropharyngeal exudate or posterior oropharyngeal erythema.   Eyes:      Extraocular Movements: Extraocular movements intact.   Cardiovascular:      Rate and Rhythm: Normal rate.      Heart sounds: No gallop.    Pulmonary:      Effort: No respiratory distress.      Breath sounds: No wheezing.   Abdominal:      General: There is no distension.      Tenderness: There is no abdominal tenderness.   Musculoskeletal:         General: Tenderness present.      Comments: Right BKA  Left BKA with tender surgical site and wrapping    Skin:     General: Skin is warm.   Neurological:      Mental Status: He is alert and oriented to person, place, and time. Mental status is  at baseline.      Motor: No weakness.   Psychiatric:         Mood and Affect: Mood normal.         Behavior: Behavior normal.         Fluids    Intake/Output Summary (Last 24 hours) at 5/29/2021 0713  Last data filed at 5/29/2021 0400  Gross per 24 hour   Intake 540 ml   Output 150 ml   Net 390 ml       Laboratory  Recent Labs     05/27/21  2311 05/28/21  0637 05/29/21  0508   WBC 15.6* 14.1* 10.1   RBC 2.68* 2.54* 2.30*   HEMOGLOBIN 8.8* 8.3* 7.5*   HEMATOCRIT 26.7* 25.6* 23.6*   MCV 99.6* 100.8* 102.6*   MCH 32.8 32.7 32.6   MCHC 33.0* 32.4* 31.8*   RDW 63.8* 63.7* 63.5*   PLATELETCT 284 288 306   MPV 10.0 9.8 9.4     Recent Labs     05/27/21  0005 05/28/21  0637 05/29/21  0508   SODIUM 133* 134* 133*   POTASSIUM 4.7 4.1 4.9   CHLORIDE 95* 95* 97   CO2 26 27 25   GLUCOSE 105* 101* 121*   BUN 37* 27* 38*   CREATININE 6.48* 4.97* 6.16*   CALCIUM 9.0 8.7 8.7                   Imaging  US-EXTREMITY ARTERY LOWER BILAT   Final Result      US-PATSY SINGLE LEVEL BILAT   Final Result      DX-FOOT-2- LEFT   Final Result      1.  Soft tissue swelling of LEFT 4th toe.   2.  No gross bony destruction however osteomyelitis is not excluded by plain film.   3.  Prior partial amputation of great toe.      DX-CHEST-PORTABLE (1 VIEW)   Final Result      Left lower lobe pleural thickening and calcification may be related to sequelae of prior infection or trauma.      Patchy left basilar opacity may represent atelectasis or pneumonitis.      Mild cardiomegaly.      Atherosclerotic plaque.              Assessment/Plan  * Cellulitis of left foot- (present on admission)  Assessment & Plan  One dose of vancomycin given in the ER  On 5/22, foot wound culture grew ESBL  Wound culture growing Klebsiella pneumonia ESBL, but use me diabetes, and Staph aureus   Continue meropenem as per infectious disease recommendations  Orthopedic surgery following, planning for left BKA on a.m.  Labs in a.m.      Macrocytic anemia- (present on  admission)  Assessment & Plan  Hemoglobin 6.5 today  No signs of GI bleed  Vit B12/folate unremarkable  Secondary to anemia of chronic kidney disease  Transfuse 1 unit of PRBC  Nephrology following, recommending iron repletion  Continue erythropoietin as per nephrology    S/P BKA (below knee amputation) unilateral, left (HCC)  Assessment & Plan  Status post left BKA on 5/28/2023 1  Scheduled and as needed pain mgmt  Orthopedic surgery following for post BKA care  Labs on AM    DNR (do not resuscitate)- (present on admission)  Assessment & Plan  Discussed with Mr. Sepulveda and he confirmed this status.    Ischemia of toe- (present on admission)  Assessment & Plan  S/P left BKA on 5/28  Resolved    Advance care planning- (present on admission)  Assessment & Plan  In a previous visit, he had indicated to palliative that he would consider hospice/comfort care  Currently, vascular recommend surgery and patient is agreeable  Consulted palliative care for further goals of care discussion after surgery    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)- (present on admission)  Assessment & Plan  Right BKA and left great toe amputation    Cardiomyopathy, ischemic- (present on admission)  Assessment & Plan  EF from heart cath 5/10 15-20%    Peripheral vascular disease (HCC)- (present on admission)  Assessment & Plan  Hx of multiple amputations     Hypotension- (present on admission)  Assessment & Plan  Chronic condition  Discontinue coreg  Continue home midodrine  IV bolus  Concerning for sepsis versus cardiogenic shock    Paroxysmal A-fib ([facility])- (present on admission)  Assessment & Plan  Chronic condition  Hold Eliquis in case he needs surgery and utilize SQ heparin for DVT prophylaxis    End stage renal disease on dialysis ([facility])- (present on admission)  Assessment & Plan  Dialysis via left arm fistula T,Th,S  Dr. Hodges, nephrology was consulted from the ER    Type 2 diabetes mellitus with kidney  complication, with long-term current use of insulin (HCC)- (present on admission)  Assessment & Plan  With hyperglycemia  Continue Lantus and sliding scale  Diabetic diet    CAD (coronary artery disease)- (present on admission)  Assessment & Plan  With hx of stents followed by [facility]    Hyponatremia- (present on admission)  Assessment & Plan  Mild, continue to monitor       VTE prophylaxis: SCDs, or in a.m.

## 2021-05-29 NOTE — PROGRESS NOTES
0700 Patient's in bed. Bedside report received from SIERRA Gambino RN at the beginning of the shift.      0849 Patient's sitting up in bed. Educated on the importance/use of IS at least 10x every hour while awake, able to reach 1000. Fall protocol in effect. Call light within reach. Reminded patient to call for assist. Medicated with Tramadol (see MAR) for c/o's for c/o's back and right hip pain, rates pain 6/10. Assessment completed. No distress noted. Plan of care reviewed with the patient. Verbalized understanding. Isolation precaution observed.     0940 Patient's in bed. No distress noted.     1009 Placed a call to Dr Tolentino.    1022 Received a call from Dr Tolentino, clarified order of Plavix, new order received and acknowledged - OK to give Plavix as ordered.      1109 New order received US - hemodialysis graft duplex comp upper extremity.    1227 New order from Dr Tolentino - NPO sips with medications at midnight.     1230 Patient's having Dialysis in his room as per order.    1301 Mucinex given (see MAR) for c/o's cough.    1310 Patient's having US as per order.      1450 Ally, PT and OT worked with the patient.     1710 Patient's in bed. No distress noted.     1830 Medicated with Tramadol and Mucinex (see MAR) for c/o's LLE pain, rates pain 6/10 and cough. Spouse visiting.    1855 Patient's in bed. Bedside report given to Oncoming NOC RN (Autumn).

## 2021-05-29 NOTE — PROGRESS NOTES
RN skin check completed with JAN Palmer     Noted dressing to LLE, CD&I.   Bruising to RUE.  Devices in place Oxygen (nasal cannula), , hemovac.  Skin assessed under devices yes.  Confirmed pressure ulcers found on coccyx (already existing).  New potential pressure ulcers noted on none. Wound consult placed wound following..  The following interventions in place q 2 hour turn in effect. On low airloss mattress, TAPS in use, oxy ears.

## 2021-05-29 NOTE — PROGRESS NOTES
2 RN skin check complete.   Devices in place nasal canula .  Skin assessed under devices yes.  Confirmed pressure ulcers found on none.  New potential pressure ulcers noted on coccyx. Wound consult placed previously found by day shift.  Pt's buttocks and sacrum are red but blanching, new BKA of pt's left leg and pt has old BKA of right leg,  ears are pink and elbows are pink. AV fistula on left midarm.   The following interventions in place: the pt is being turned, he is on a low air loss mattress, silicone tubing, left legis being floated on a pillow.

## 2021-05-29 NOTE — PROGRESS NOTES
HD treatment today per routine order using LUAAVF.Difficulty cannulating arterial site.String of clots aspirated from it.No issues noted with the venous site.Treatmetn terminated early.Patient able to dialyze for 1 H and 20 mins only.Arterial avf site ended up getting malfunctioned,with more clots upon needle removal.Dr Ortega and Alex's charge Rn both notified.No UF removed.Report given to primary Rn.Instruction given to follow up on the US of AVF.

## 2021-05-29 NOTE — PROGRESS NOTES
Infectious Disease Progress Note    Author: Mary Kate Moser M.D. Date & Time of service: 2021  12:56 PM    Chief Complaint:  Gangrene left toes  Cellulitis    Interval History:  75 y.o. male admitted 2021. + diabetes, ESRD on HD, peripheral arterial disease, A. fib, history of left great toe amputation due to osteomyelitis in May 2020, hyperlipidemia, status post right BKA in 2020, CAD with recent ischemic cardiomyopathy, admitted on 2021 with left foot pain, found to have a necrotic appearing left fourth toe.  Swab obtained +ESBL Klebsiella, Proteus, MSSA   AF WBC ID reconsulted as patient with increased WBC and hemodynamically less stable (hypotension)+left foot pain   AF WBC 13.4 getting HD in room-foot remains very tender, especially the heel toes 3-5 gangrenous/ischemic   AF WBC 10.1 getting HD in room s/p BKA     Labs Reviewed, Medications Reviewed, Radiology Reviewed and Wound Reviewed.    Review of Systems:  Review of Systems   Unable to perform ROS: Other   Constitutional: Negative for fever.       Hemodynamics:  Temp (24hrs), Av.8 °C (98.3 °F), Min:36.5 °C (97.7 °F), Max:37.3 °C (99.1 °F)  Temperature: 37.3 °C (99.1 °F)  Pulse  Av  Min: 45  Max: 108   Blood Pressure : (!) 99/47       Physical Exam:  Physical Exam  Vitals and nursing note reviewed.   Constitutional:       General: He is not in acute distress.     Appearance: He is not toxic-appearing or diaphoretic.   HENT:      Nose: No rhinorrhea.      Mouth/Throat:      Comments: Poor dentition  Eyes:      General:         Right eye: No discharge.         Left eye: No discharge.   Cardiovascular:      Rate and Rhythm: Normal rate. Rhythm irregular.   Pulmonary:      Effort: Pulmonary effort is normal. No respiratory distress.      Breath sounds: No stridor.   Abdominal:      General: There is no distension.   Musculoskeletal:         General: Tenderness present.      Comments: Right BKA  S/p left  BKA-dressed/HV   Skin:     Coloration: Skin is not jaundiced.         Meds:    Current Facility-Administered Medications:   •  gabapentin  •  [COMPLETED] ferric gluconate (FERRLECIT) IV **FOLLOWED BY** [COMPLETED] ferric gluconate (FERRLECIT) IV **FOLLOWED BY** ferric gluconate (FERRLECIT) IV  •  oxyCODONE immediate-release  •  traMADol  •  guaiFENesin ER  •  acetaminophen  •  clopidogrel  •  heparin  •  sevelamer carbonate  •  atorvastatin  •  calcitRIOL  •  [Held by provider] insulin glargine  •  methimazole  •  midodrine  •  tamsulosin  •  senna-docusate **AND** polyethylene glycol/lytes **AND** [DISCONTINUED] magnesium hydroxide **AND** bisacodyl  •  ondansetron  •  ondansetron  •  insulin regular **AND** POC blood glucose manual result **AND** NOTIFY MD and PharmD **AND** glucose **AND** dextrose 50%  •  epoetin  •  heparin    Labs:  Recent Labs     05/27/21  0005 05/27/21  0348 05/27/21  2311 05/28/21  0637 05/29/21  0508   WBC 13.7*  13.6*   < > 15.6* 14.1* 10.1   RBC 2.27*  2.32*   < > 2.68* 2.54* 2.30*   HEMOGLOBIN 7.5*  7.6*   < > 8.8* 8.3* 7.5*   HEMATOCRIT 23.0*  23.3*   < > 26.7* 25.6* 23.6*   .3*  100.4*   < > 99.6* 100.8* 102.6*   MCH 33.0  32.8   < > 32.8 32.7 32.6   RDW 69.5*  67.7*   < > 63.8* 63.7* 63.5*   PLATELETCT 209  218   < > 284 288 306   MPV 10.1  10.1   < > 10.0 9.8 9.4   NEUTSPOLYS 82.20*  --   --  82.10* 75.30*   LYMPHOCYTES 7.20*  --   --  8.70* 13.70*   MONOCYTES 8.10  --   --  7.70 8.10   EOSINOPHILS 0.90  --   --  0.00 1.10   BASOPHILS 0.70  --   --  0.60 0.50    < > = values in this interval not displayed.     Recent Labs     05/27/21  0005 05/28/21  0637 05/29/21  0508   SODIUM 133* 134* 133*   POTASSIUM 4.7 4.1 4.9   CHLORIDE 95* 95* 97   CO2 26 27 25   GLUCOSE 105* 101* 121*   BUN 37* 27* 38*     Recent Labs     05/27/21  0005 05/28/21  0637 05/29/21  0508   ALBUMIN 2.9* 2.8* 2.6*   CREATININE 6.48* 4.97* 6.16*       Imaging:  DX-CHEST-PORTABLE (1 VIEW)    Result  Date: 5/20/2021 5/20/2021 7:10 AM HISTORY/REASON FOR EXAM:  Chest Pain. TECHNIQUE/EXAM DESCRIPTION AND NUMBER OF VIEWS: Single portable view of the chest. COMPARISON: 5/10/2021 FINDINGS: The cardiomediastinal silhouette is enlarged. Atherosclerotic calcification is seen. There is pleural thickening and calcification at the left lung base. There is patchy left basilar opacity. There is blunting of the left costophrenic angle. No pneumothorax is seen.     Left lower lobe pleural thickening and calcification may be related to sequelae of prior infection or trauma. Patchy left basilar opacity may represent atelectasis or pneumonitis. Mild cardiomegaly. Atherosclerotic plaque.     DX-CHEST-PORTABLE (1 VIEW)    Result Date: 5/10/2021    5/10/2021 9:25 PM HISTORY/REASON FOR EXAM: Shortness of Breath TECHNIQUE/EXAM DESCRIPTION:  Single AP view of the chest. COMPARISON: January 19, 2021 FINDINGS: Overlying cardiac leads are present. Cardiomegaly is observed. The mediastinal contour appears within normal limits.  Bilateral perihilar interstitial prominence and bronchial wall cuffing is noted. The lungs appear well expanded bilaterally.  Hazy left lung base opacity is seen. Blunting of the left costophrenic angle is seen suggesting trace left effusion. The bony structures appear age-appropriate.     1.  Left lung base infiltrate or atelectasis with small left pleural effusion 2.  Cardiomegaly 3.  Perihilar interstitial prominence and bronchial wall cuffing, appearance suggests changes of underlying bronchial inflammation, consider bronchitis.    DX-FOOT-2- LEFT    Result Date: 5/20/2021 5/20/2021 7:35 AM HISTORY/REASON FOR EXAM:  cellulits starting from left 4th toe eval for osteo TECHNIQUE/EXAM DESCRIPTION AND NUMBER OF VIEWS: 2 views of the LEFT foot. COMPARISON:  5/14/2020 FINDINGS: Prior partial amputation of great toe at the base of proximal phalanx. Soft tissue swelling of 4th toe. No soft tissue gas. Diffuse vascular  calcifications. No focal bony destruction. Calcaneal enthesophytes. Calcification dorsal to calcaneus, possibly within Achilles tendon, chronic.     1.  Soft tissue swelling of LEFT 4th toe. 2.  No gross bony destruction however osteomyelitis is not excluded by plain film. 3.  Prior partial amputation of great toe.    US-PATSY SINGLE LEVEL BILAT    Result Date: 2021   Vascular Laboratory  Conclusions  No flow by PPG 2-5th toes.  severely depressed PATSY dorsalis pedis, normal PATSY posterior tibial on left  side.  KLARISSA BRADSHAW  Age:    75    Gender:     M  MRN:    [id number]  :    1945      BSA:  Exam Date:     2021 10:13  Room #:     Inpatient  Priority:     Routine  Ht (in):             Wt (lb):  Ordering Physician:        GINGER FRAZIER  Referring Physician:       [id number]  Sonographer:               Asha Bowens  Study Type:                Limited Bilateral  Technical Quality:         Fair  Indications:     Type 2 diabetes mellitus with foot ulcer  CPT Codes:       52041  ICD Codes:       E11.621  History:         Right below the knee amputation, left big toe amputated, left                   fourth toe ischemia. Prior exam 20  Limitations:     Right below the knee amputation, left big toe amputated,                   unable to obtain left brachial pressure due to AVF, bandage                   over the right common femoral.                 RIGHT  Waveform            Systolic BPs (mmHg)                             120           Brachial                                           Common Femoral                                           Posterior Tibial                                           Dorsalis Pedis                                           Peroneal                                           PATSY                                           TBI                       LEFT  Waveform        Systolic BPs (mmHg)                                            Brachial  Triphasic                                Common Femoral  Monophasic                 127           Posterior Tibial  Monophasic                 45            Dorsalis Pedis                                           Peroneal                             1.06          PATSY                                           TBI  Findings  Right-  Ankle pressure not obtained due to below the knee amputation.  Bandage over the common femoral artery.  Doppler waveform of the popliteal artery is of high amplitude and  triphasic.  Left-  Ankle-brachial index of the dorsalis pedis artery is severely reduced.  Ankle-brachial index of the posterior tibial artery is normal.  Doppler waveform of the common femoral artery is of high amplitude and  triphasic.  Doppler waveforms at the ankle are monophasic with moderate amplitude.  PPG's-  No flow  Bilateral arterial duplex scan was performed in accordance with lower  extremity arterial evaluation protocol - see separate report.  Mark Rodriguez MD  (Electronically Signed)  Final Date:      21 May 2021 15:25    US-EXTREMITY ARTERY LOWER BILAT    Result Date: 2021  Lower Extremity  Arterial Duplex Report  Vascular Laboratory  CONCLUSIONS  Left below knee diffuse disease, absent flow in peroneal artery.  KLARISSA BRADSHAW  Exam Date:     2021 12:09  Room #:     Inpatient  Priority:     Routine  Ht (in):             Wt (lb):  Ordering Physician:        GINGER FRAZIER  Referring Physician:       [id number] Miguel  Sonographer:               Asha Bowens  Study Type:                Limited Bilateral  Technical Quality:         Adequate  Age:    75    Gender:     M  MRN:    [id number]  :    1945      BSA:  Indications:     Type 2 diabetes mellitus with foot ulcer  CPT Codes:       66760  ICD Codes:       E11.621  History:         Right below the knee amputation, left big toe amputated,                    fourth toe ischemia. Prior exam 4/21/20.  Limitations:     Rigth below the knee amputation.                RIGHT  Waveform        Peak Systolic Velocity (cm/s)                  Prox    Prox-Mid  Mid    Mid-Dist  Distal  Triphasic                         52                       CFA  Biphasic        55                                         PFA  Biphasic        44                65      24       56      SFA  Biphasic                          35                       POP  Monophasic      62                                         AT  Monophasic      51                                         PT  Not                                                        LISA  attained                LEFT  Waveform        Peak Systolic Velocity (cm/s)                  Prox    Prox-Mid  Mid    Mid-Dist  Distal  Triphasic                         132                      CFA  Biphasic        43                                         PFA  Biphasic        50                62               42      SFA  Triphasic                         56                       POP  Monophasic      48                                 70      AT  Monophasic      37                                 30      PT  Absent          0                                  54      LISA  FINDINGS  Right-  Calcific atherosclerotic plaque seen throughout the lower extremity.  Stenosis of the distal superficial femoral artery. Velocities are  consistent with 50-75% stenosis.  Proximal anterior and posterior tibial artery waveforms are monophasic.  Proximal peroneal artery was not visualized.  Left-  Calcific atherosclerotic plaque seen throughout the lower extremity.  Monophasic waveforms seen in the anterior and posterior tibial arteries.  Absent flow seen in the proximal peroneal artery.  Mark Rodriguez MD  (Electronically Signed)  Final Date:      21 May 2021 14:42    US-HEMODIALYSIS GRAFT DUPLEX COMP UPPER EXTREMITY    Result Date: 5/13/2021   Hemodialysis  Access Report  Vascular Laboratory  Conclusions  1) Patent left brachial, radial, and ulnar arteries with dense calcific  plaque throughout.  2) Patent DRIL bypass graft.  Flow in the distal left radial artery is  antegrade and monophasic to biphasic with good waveforms.  Flow in the  distal ulnar artery is attenuated.  3) Patent left brachiocephalic fistula with area of narrowing seen in the  distal bicep area.  Flow volume is 499ml/min in the mid biceps.  KLARISSA BRADSHAW  Exam Date:     2021 12:46  Room #:     Inpatient  Priority:     Stat  Ht (in):             Wt (lb):  Ordering Physician:        MOHIT LUTZ  Referring Physician:       [id number] Elena  Sonographer:               Patricia Wheatley RVSABRINA  Study Type:                Complete Unilateral  Technical Quality:         Adequate  Age:    75    Gender:     M  MRN:    [id number]  :    1945      BSA:  Indications:     End stage renal disease  CPT Codes:       53728  ICD Codes:       N18.6  History:         Left brachiocephalic fistula with DRIL procedure. Prior study                   done 1/3/21.  Limitations:  Exam Data:  Side:          Left  Access          Fistula  Inflow Artery   Brachial  Outflow Vein    Cephalic                     Velocity (cm/s)   Prox Inflow artery          133.0                               0   Mid Inflow artery           165.0                               0   Distal Inflow artery        141.0                               0   Inflow Anastomosis          301.0                               0   Inflow Artery distal to     28.00   anastomosis          Antegrade   Proximal Graft   Mid Graft   Distal Graft   Outflow Anastomosis   Proximal Outflow Vein       286.0                               0   Mid Outflow Vein            135.0                               0   Distal Outflow Vein         77.00   Flow Volume Mid Bicep       499.0ml/min                               0   Flow Volume Mid-Forearm          ml/min   Prox  Outflow Vein Diam      0.59  cm   Mid Outflow Vein Diam       0.73  cm   Distal Outflow Vein Diam    0.63  cm  Findings  Dense calcific plaque is seen in the brachial, radial and ulnar arteries.  DRIL Bypass graft is patent without evidence of stenosis.  Velocities in the DRIL bypass are as follows (cm/s): Proximal to prox  anastamosis: 204 Prox anastamosis: 172 Prox: 53 Mid: 25 Distal: 21  Distal  anastamosis: 20  Distal to distal anastamosis: 28.  The radial and ulnar arteries are patent distally.  The cephalic vein is patent. Elevated velocity and diameter reduction is  seen in the cephalic vein at distal bicep. Velocity increases from 135 cm/s  to 400 cm/s. Diameter size decreases from 0.74 cm to 0.41 cm.  Flow volume is listed above along with other velocities and diameter  measurments through the bicep.  Johnathan Honeycutt M.D.  (Electronically Signed)  Final Date:      13 May 2021 08:05    EC-ECHOCARDIOGRAM COMPLETE W/O CONT    Result Date: 2021  Transthoracic Echo Report Echocardiography Laboratory CONCLUSIONS Compared to the images of the study done 2020 - there is now reduced ejection fraction with atrial fibrillation. Moderately reduced left ventricular systolic function. Left ventricular ejection fraction is visually estimated to be 30%. Global hypokinesis. Diastolic function is difficult to assess with atrial fibrillation. The right ventricle was normal in size and function. No significant valve disease or flow abnormalities. These findings communicated to the ordering physician at the time of the exam. KLARISSA BARDSHAW Exam Date:         2021                    10:10 Exam Location:     Inpatient Priority:          Routine Ordering Physician:        NELSON CHILDERS                             ([id number]) Referring Physician:       [id number] Sonographer:               Mahamed RHODES Age:    75     Gender:    M MRN:    [id number] :    1945 BSA:     1.87   Ht (in):    66     Wt (lb):    170 Exam Type:     Complete Indications:     Shortness of breath ICD Codes:       R06.02 CPT Codes:       79784 BP:   125    /   84     HR:   100 Technical Quality:       Fair MEASUREMENTS  (Male / Female) Normal Values 2D ECHO LV Diastolic Diameter PLAX        5.7 cm                4.2 - 5.9 / 3.9 - 5.3 cm LV Systolic Diameter PLAX         4.4 cm                2.1 - 4.0 cm IVS Diastolic Thickness           0.76 cm               LVPW Diastolic Thickness          0.89 cm               LVOT Diameter                     2.5 cm                Estimated LV Ejection Fraction    30 %                  LV Ejection Fraction MOD BP       35.6 %                >= 55  % LV Ejection Fraction MOD 4C       36 %                  LV Ejection Fraction MOD 2C       34.8 %                IVC Diameter                      2.1 cm                DOPPLER AV Peak Velocity                  1.1 m/s               AV Peak Gradient                  5.1 mmHg              AV Mean Gradient                  3.4 mmHg              LVOT Peak Velocity                0.74 m/s              AV Area Cont Eq vti               2.8 cm2               TR Peak Velocity                  221 cm/s              PV Peak Velocity                  0.63 m/s              PV Peak Gradient                  1.6 mmHg              RVOT Peak Velocity                0.51 m/s              * Indicates values subject to auto-interpretation LV EF:  30    % FINDINGS Left Ventricle Normal left ventricular chamber size. Normal left ventricular wall thickness. Moderately reduced left ventricular systolic function. Left ventricular ejection fraction is visually estimated to be 30%. Global hypokinesis. Diastolic function is difficult to assess with atrial fibrillation. Right Ventricle The right ventricle was normal in size and function. Right Atrium The right atrium is normal in size.  Dilated inferior vena cava without inspiratory collapse. Left  "Atrium Moderately dilated left atrium. Left atrial volume index is 46 mL/sq m. Mitral Valve Structurally normal mitral valve without significant stenosis. Trace mitral regurgitation. Aortic Valve Tricuspid aortic valve. No stenosis or regurgitation seen. Tricuspid Valve Structurally normal tricuspid valve without significant stenosis. Trace tricuspid regurgitation. Unable to estimate pulmonary artery pressure due to an inadequate tricuspid regurgitant jet. Pulmonic Valve The pulmonic valve is not well visualized. No pulmonic stenosis. Trace pulmonic insufficiency. Pericardium Normal pericardium without effusion. Aorta The aortic root is normal.  Ascending aorta diameter is 3.4 cm. Tito Lucas MD (Electronically Signed) Final Date:     12 May 2021 12:03      Micro:  Results     Procedure Component Value Units Date/Time    Blood Culture [[id number]]     Order Status: No result Specimen: Blood from Peripheral     Blood Culture [[id number]]     Order Status: No result Specimen: Blood from Peripheral     BLOOD CULTURE [[id number]] Collected: 05/20/21 0740    Order Status: Completed Specimen: Blood from Peripheral Updated: 05/25/21 0900     Significant Indicator NEG     Source BLD     Site PERIPHERAL     Culture Result No growth after 5 days of incubation.    Narrative:      Per Hospital Policy: Only change Specimen Src: to \"Line\" if  specified by physician order.  No site indicated    BLOOD CULTURE [[id number]] Collected: 05/20/21 0748    Order Status: Completed Specimen: Blood from Peripheral Updated: 05/25/21 0900     Significant Indicator NEG     Source BLD     Site PERIPHERAL     Culture Result No growth after 5 days of incubation.    Narrative:      Per Hospital Policy: Only change Specimen Src: to \"Line\" if  specified by physician order.  No site indicated    CULTURE WOUND W/ GRAM STAIN [[id number]]  (Abnormal)  (Susceptibility) Collected: 05/20/21 0759    Order Status: Completed Specimen: Wound from Left Foot " Updated: 05/24/21 0710     Significant Indicator POS     Source WND     Site LEFT FOOT     Culture Result Light growth usual skin xander.     Gram Stain Result Rare WBCs.  Many Gram positive cocci.  Few Gram negative rods.       Culture Result Klebsiella pneumoniae ESBL  Moderate growth  Extended Spectrum Beta-lactamase (ESBL) isolated.  ESBL's may be clinically resistant to therapy with  Penicillins,Cephalosporins or Aztreonam despite  apparent in vitro susceptibility to some of these agents.  The patient requires contact isolation.  Please contact pharmacy or an Infectious Disease Specialist  if you have any questions about appropriate therapy.        Proteus mirabilis  Light growth        Staphylococcus aureus  Moderate growth  This isolate is presumed to be clindamycin resistant based on  detection of inducible resistance.  Clindamycin may still  be effective in some patients.      Narrative:      CALL  [facility]  131 tel. [phone],  CALLED  131 tel. [phone] 05/22/2021, 09:32, RB PERF. RESULTS CALLED TO: RN  [id number]    Susceptibility     Klebsiella pneumoniae esbl (1)     Antibiotic Interpretation Microscan Method Status    Ceftriaxone Extended Spectrum Beta Lactamase >32 mcg/mL BINU Final    Cefazolin Resistant >16 mcg/mL BINU Final    Ciprofloxacin Resistant >2 mcg/mL BINU Final    Cefepime Resistant >16 mcg/mL BINU Final    Cefuroxime Resistant >16 mcg/mL BINU Final    Ertapenem Sensitive <=0.5 mcg/mL BINU Final    Gentamicin Sensitive <=2 mcg/mL BINU Final    Moxifloxacin Intermediate 4 mcg/mL BINU Final    Ampicillin/sulbactam Intermediate 16/8 mcg/mL BINU Final    Pip/Tazobactam Sensitive <=8 mcg/mL BINU Final    Trimeth/Sulfa Resistant >2/38 mcg/mL BINU Final    Tigecycline Sensitive <=2 mcg/mL BINU Final    Tobramycin Sensitive <=2 mcg/mL BINU Final          Proteus mirabilis (2)     Antibiotic Interpretation Microscan Method Status    Ceftriaxone Sensitive <=1 mcg/mL BINU Final    Cefazolin Sensitive <=2 mcg/mL BINU  Final    Ciprofloxacin Sensitive <=0.25 mcg/mL BINU Final    Cefepime Sensitive <=2 mcg/mL BINU Final    Cefuroxime Sensitive <=4 mcg/mL BINU Final    Ertapenem Sensitive <=0.5 mcg/mL BINU Final    Gentamicin Sensitive <=2 mcg/mL BINU Final    Moxifloxacin Sensitive <=2 mcg/mL BINU Final    Pip/Tazobactam Sensitive <=8 mcg/mL BINU Final    Trimeth/Sulfa Sensitive <=0.5/9.5 mcg/mL BINU Final    Tobramycin Sensitive <=2 mcg/mL BINU Final    Ampicillin Sensitive <=8 mcg/mL BINU Final          Staphylococcus aureus (3)     Antibiotic Interpretation Microscan Method Status    Cefazolin Sensitive <=8 mcg/mL BINU Final    Cefepime Sensitive <=4 mcg/mL BINU Final    Ampicillin/sulbactam Sensitive <=8/4 mcg/mL BINU Final    Pip/Tazobactam Sensitive <=8 mcg/mL BINU Final    Vancomycin Sensitive 1 mcg/mL BINU Final    Trimeth/Sulfa Sensitive <=0.5/9.5 mcg/mL BINU Final    Tetracycline Sensitive <=4 mcg/mL BINU Final    Azithromycin Resistant >4 mcg/mL BINU Final    Clindamycin Resistant <=0.25 mcg/mL BINU Final    Ceftaroline Sensitive <=0.5 mcg/mL BINU Final    Daptomycin Sensitive <=0.5 mcg/mL BINU Final    Erythromycin Resistant >4 mcg/mL BINU Final    Oxacillin Sensitive 0.5 mcg/mL BINU Final            Condensed View                   GRAM STAIN [[id number]] Collected: 05/20/21 0759    Order Status: Completed Specimen: Wound Updated: 05/23/21 0719     Significant Indicator .     Source WND     Site LEFT FOOT     Gram Stain Result Rare WBCs.  Many Gram positive cocci.  Few Gram negative rods.      Narrative:      CALL  [facility]  131 tel. [phone],  CALLED  131 tel. [phone] 05/22/2021, 09:32, RB PERF. RESULTS CALLED TO: RN  [id number]          Assessment:  Active Hospital Problems    Diagnosis    • *Cellulitis of left foot [L03.116]    • Ischemia of toe [I99.8]    • DNR (do not resuscitate) [Z66]    • Advance care planning [Z71.89]    • 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) [Z89.511]    • Cardiomyopathy, ischemic [I25.5]       • Peripheral vascular disease (HCC) [I73.9]    • Hypotension [I95.9]    • Paroxysmal A-fib (HCC) [I48.0]    • End stage renal disease on dialysis (HCC) [N18.6, Z99.2]    • Type 2 diabetes mellitus with kidney complication, with long-term current use of insulin (HCC) [E11.29, Z79.4]    • Macrocytic anemia [D53.9]    • CAD (coronary artery disease) [I25.10]    • Hyponatremia [E87.1]      Necrotic left fourth and fifth toe  Ischemic forefoot  Diabetes  ESRD on HD  History of left great toe osteomyelitis-s/p amputation  Status post right BKA  CAD  Ischemic cardiomyopathy  Severe PVD-at risk for poor wound healing     Plan:   S/p BKA 5/28  Leukocytosis resolved-dc meropenem  Keep BS under 150 to promote healing  Wound care    Will sign off-please reconsult if needed

## 2021-05-30 NOTE — PROGRESS NOTES
Mobility Progress Note    Surgery patient: Yes  Date of surgery: 5/28/21  Ambulated 50 ft on day of surgery? (N/A if patient did not undergo surgery today): NA  Number of times ambulated 50 feet or greater today: 0  Patient has been up to chair, edge of bed or HOB 90 degrees for all meals?: Yes  Goal met? (goal is ambulating at least 50 feet 2 times on day shift, one time on night shift): No  If patient did not meet mobility goal, why?: Patient has bilateral BKA. Patient is NWB to MARCY and COCO and is unable to ambulate at this time

## 2021-05-30 NOTE — CARE PLAN
The patient is Stable - Low risk of patient condition declining or worsening    Shift Goals  Clinical Goals: sleep comfortably  Patient Goals: pain management    Progress made toward(s) clinical / shift goals:      Problem: Fall Risk  Goal: Patient will remain free from falls  Outcome: Progressing  Note: *Non-slip socks on   *Bed locked and in lowest position; bed alarm on  *Belongings and call light within reach; educated on call light use and received understanding from patient   *Room free of clutter and spills; no injuries sustained during shift.      Problem: Pain - Standard  Goal: Alleviation of pain or a reduction in pain to the patient’s comfort goal  Outcome: Progressing  Flowsheets (Taken 5/29/2021 2225)  Pain Rating Scale (NPRS): 10

## 2021-05-30 NOTE — PROGRESS NOTES
Hospital Medicine Daily Progress Note    Date of Service  5/30/2021    Chief Complaint  75 y.o. male admitted 5/20/2021 with weakness and dizziness    Hospital Course  75 y.o. male who presented 5/20/2021 with left foot pain  Mr. Sepulveda has a past medical history of end-stage renal disease on dialysis, peripheral vascular disease status post right below the knee amputation, insulin-dependent diabetes mellitus, there was most recently admitted here from 5/10/2021 through 5/18/2021 with shortness of breath.  During that hospitalization he underwent heart catheterization revealing ejection fraction of 15 to 20% with 80% ostial LAD in-stent restenosis and distal RCA disease.  He presents back today with 4 to 5 days of left foot pain especially when walking with his walker.  He has been compliant with dialysis and went on Tuesday.  He was admitted for cellulitis of the left foot with what appears to be an ischemic left fourth toe.  He has been on Eliquis which was be held in case he requires amputation.  Upon admission, he was seen by LPS.  ID saw him as well and DC'd his antibiotics as he was not septic.  Palliative care saw him and patient does want surgery for now.  Vascular is evaluating him to see if he is a candidate for surgery.      5/21: PATSY done and arterial ultrasound pending.  Patient feels okay with no fevers or chills so far.  5/22: Find of some loose stools.  Culture started growing ESBL.  Consulted ID Dr. Love.  5/23: Hgb did decrease to 7.1 but stable at 7.5 on repeat.  I have asked and heparin were appropriately held overnight, now resuming.   5/24: Seen by palliative.  Patient does want surgery for now.  Updated vascular.    Interval Problem Update  S/P  left BKA on 5/28  Patient evaluated bedside, having meal and coughing during meal  Above has been going on for about a week, keep n.p.o.  Requiring 4 L nasal cannula to maintain saturation,  No leukocytosis or signs of infection  5/29/2021 AVF  U/S showing dense calcific plaque observed in the brachial, radial & distal ulnar, the DRIL graft is patent with no stenosis observed  Nephrology following, appreciate recommendations  Repeat chest x-ray   PT/OT recommending SNF, placed referral  Up to chair     Disposition  Left BKA    Review of Systems  Review of Systems   Constitutional: Negative for fever.   Respiratory: Negative for cough, shortness of breath and wheezing.    Cardiovascular: Negative for chest pain.   Gastrointestinal: Positive for diarrhea (loose Stools). Negative for abdominal pain, constipation, nausea and vomiting.   Genitourinary: Negative for dysuria.   Musculoskeletal: Positive for joint pain.   Neurological: Negative for headaches.   Psychiatric/Behavioral: The patient is nervous/anxious.         Physical Exam  Temp:  [36.7 °C (98.1 °F)-37.4 °C (99.3 °F)] 36.8 °C (98.2 °F)  Pulse:  [79-89] 83  Resp:  [16-17] 16  BP: ()/(44-75) 111/44  SpO2:  [95 %-99 %] 99 %    Physical Exam  Constitutional:       General: He is not in acute distress.     Appearance: He is well-developed. He is obese. He is not diaphoretic.   HENT:      Head: Normocephalic and atraumatic.      Right Ear: External ear normal.      Left Ear: External ear normal.      Mouth/Throat:      Pharynx: No oropharyngeal exudate or posterior oropharyngeal erythema.   Eyes:      Extraocular Movements: Extraocular movements intact.   Cardiovascular:      Rate and Rhythm: Normal rate.      Heart sounds: No gallop.    Pulmonary:      Effort: Respiratory distress present.      Breath sounds: Rales present.   Abdominal:      General: There is no distension.      Tenderness: There is no abdominal tenderness.   Musculoskeletal:         General: Tenderness present.      Comments: Right BKA  Left BKA with tender surgical site   Skin:     General: Skin is warm.   Neurological:      Mental Status: He is alert and oriented to person, place, and time. Mental status is at baseline.       Motor: No weakness.   Psychiatric:         Mood and Affect: Mood normal.         Behavior: Behavior normal.         Fluids    Intake/Output Summary (Last 24 hours) at 5/30/2021 1011  Last data filed at 5/30/2021 0413  Gross per 24 hour   Intake 820 ml   Output 160 ml   Net 660 ml       Laboratory  Recent Labs     05/28/21  0637 05/29/21  0508 05/30/21  0538   WBC 14.1* 10.1 9.6   RBC 2.54* 2.30* 2.40*   HEMOGLOBIN 8.3* 7.5* 8.0*   HEMATOCRIT 25.6* 23.6* 24.6*   .8* 102.6* 102.5*   MCH 32.7 32.6 33.3*   MCHC 32.4* 31.8* 32.5*   RDW 63.7* 63.5* 60.6*   PLATELETCT 288 306 329   MPV 9.8 9.4 9.4     Recent Labs     05/28/21  0637 05/29/21  0508 05/30/21  0538   SODIUM 134* 133* 135   POTASSIUM 4.1 4.9 4.3   CHLORIDE 95* 97 96   CO2 27 25 28   GLUCOSE 101* 121* 88   BUN 27* 38* 35*   CREATININE 4.97* 6.16* 5.83*   CALCIUM 8.7 8.7 8.7                   Imaging  US-HEMODIALYSIS GRAFT DUPLEX COMP UPPER EXTREMITY   Final Result      US-EXTREMITY ARTERY LOWER BILAT   Final Result      US-PATSY SINGLE LEVEL BILAT   Final Result      DX-FOOT-2- LEFT   Final Result      1.  Soft tissue swelling of LEFT 4th toe.   2.  No gross bony destruction however osteomyelitis is not excluded by plain film.   3.  Prior partial amputation of great toe.      DX-CHEST-PORTABLE (1 VIEW)   Final Result      Left lower lobe pleural thickening and calcification may be related to sequelae of prior infection or trauma.      Patchy left basilar opacity may represent atelectasis or pneumonitis.      Mild cardiomegaly.      Atherosclerotic plaque.         DX-CHEST-LIMITED (1 VIEW)    (Results Pending)        Assessment/Plan  * Cellulitis of left foot- (present on admission)  Assessment & Plan  One dose of vancomycin given in the ER  On 5/22, foot wound culture grew ESBL  Wound culture growing Klebsiella pneumonia ESBL, but use me diabetes, and Staph aureus   Continue meropenem as per infectious disease recommendations  Orthopedic surgery following,  planning for left BKA on a.m.  Labs in a.m.      Acute respiratory failure with hypoxia (HCC)  Assessment & Plan  Requiring 4 L nasal cannula with a saturation  Likely secondary to fluid overload from not completing dialysis versus aspiration  Patient noted to be coughing during meals, concerning for aspiration  Chest x-ray  Keep n.p.o. for now  Speech evaluation for swallow    Macrocytic anemia- (present on admission)  Assessment & Plan  Hemoglobin 6.5 today  No signs of GI bleed  Vit B12/folate unremarkable  Secondary to anemia of chronic kidney disease  Transfuse 1 unit of PRBC  Nephrology following, recommending iron repletion  Continue erythropoietin as per nephrology    S/P BKA (below knee amputation) unilateral, left (HCC)  Assessment & Plan  Status post left BKA on 5/28/2023 1  Scheduled and as needed pain mgmt  Orthopedic surgery following for post BKA care  Labs on AM    DNR (do not resuscitate)- (present on admission)  Assessment & Plan  Discussed with Mr. Sepulveda and he confirmed this status.    Ischemia of toe- (present on admission)  Assessment & Plan  S/P left BKA on 5/28  Resolved    Advance care planning- (present on admission)  Assessment & Plan  In a previous visit, he had indicated to palliative that he would consider hospice/comfort care  Currently, vascular recommend surgery and patient is agreeable  Consulted palliative care for further goals of care discussion after surgery    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)- (present on admission)  Assessment & Plan  Right BKA and left great toe amputation     Cardiomyopathy, ischemic- (present on admission)  Assessment & Plan  EF from heart cath 5/10 15-20%    Peripheral vascular disease (HCC)- (present on admission)  Assessment & Plan  Hx of multiple amputations     Hypotension- (present on admission)  Assessment & Plan  Chronic condition  Discontinue coreg  Continue home midodrine  IV bolus  Concerning for sepsis versus  cardiogenic shock    Paroxysmal A-fib (HCC)- (present on admission)  Assessment & Plan  Chronic condition  Hold Eliquis in case he needs surgery and utilize SQ heparin for DVT prophylaxis    End stage renal disease on dialysis (HCC)- (present on admission)  Assessment & Plan  Dialysis via left arm fistula T,Th,S  Dr. Hodges, nephrology was consulted from the ER    CAD (coronary artery disease)- (present on admission)  Assessment & Plan  With hx of stents followed by [facility]    Hyponatremia- (present on admission)  Assessment & Plan  Mild, continue to monitor    Type 2 diabetes mellitus with kidney complication, with long-term current use of insulin (HCC)- (present on admission)  Assessment & Plan  With hyperglycemia  Continue sliding scale  Diabetic diet       VTE prophylaxis: SCDs, or in a.m.

## 2021-05-30 NOTE — THERAPY
Physical Therapy   Initial Evaluation     Patient Name: Luis Sepulveda  Age:  75 y.o., Sex:  male  Medical Record #: [id number]  Today's Date: 5/29/2021     Precautions: Fall Risk    Assessment  Patient is 75 y.o. male with hx of R BKA and now s/p L BKA, presenting to PT with pain to distal L LE but able to perform bed mob at min A and remain seated EOB at SBA. Discussed importance of positioning with L LE, especially avoidance of pillow under L knee joint. Also, requested pt to ask spouse to bring in his R LE prosthesis. Will follow to address transfer ability and post-BKA exercises. Recommend placement.      Plan    Recommend Physical Therapy 3 times per week until therapy goals are met for the following treatments:  Bed Mobility, Equipment, Gait Training, Neuro Re-Education / Balance, Prosthetics Training, Self Care/Home Evaluation, Sensory Integration Techniques, Stair Training, Therapeutic Activities and Therapeutic Exercises    DC Equipment Recommendations: Unable to determine at this time  Discharge Recommendations: Recommend post-acute placement for additional physical therapy services prior to discharge home        Objective       05/29/21 1513   Prior Living Situation   Prior Services None   Housing / Facility 1 Story House   Steps Into Home 0   Steps In Home 0   Equipment Owned Front-Wheel Walker;Wheelchair   Lives with - Patient's Self Care Capacity Spouse;Adult Children   Prior Level of Functional Mobility   Bed Mobility Independent   Transfer Status Independent   Ambulation Independent   Distance Ambulation (Feet)   (household)   Assistive Devices Used Front-Wheel Walker   Wheelchair Independent   Comments pt reporting he just received R prosthesis. Asked pt to ask spouse to bring it to hospital.    Balance Assessment   Comments Seated EOB at SBA.    Gait Analysis   Gait Level Of Assist Unable to Participate   Bed Mobility    Supine to Sit Minimal Assist   Sit to Supine Minimal Assist   Scooting  Minimal Assist   Functional Mobility   Sit to Stand Unable to Participate   Bed, Chair, Wheelchair Transfer Unable to Participate   Short Term Goals    Short Term Goal # 1 Pt will perform bed mob at spv in 6tx to improve functional independence.    Short Term Goal # 2 Pt will perform SB transfer at min A in 6tx to imrpove functional mobility.    Short Term Goal # 3 Pt will perform transfer with R LE prosthesis and FWW at min A in 6tx to improve functional mobility.    Anticipated Discharge Equipment and Recommendations   DC Equipment Recommendations Unable to determine at this time   Discharge Recommendations Recommend post-acute placement for additional physical therapy services prior to discharge home

## 2021-05-30 NOTE — PROGRESS NOTES
BS recheck was 88. Patient is stable, no symptoms present. Will recheck BS for lunch. Patient should be receiving breakfast soon.

## 2021-05-30 NOTE — WOUND TEAM
[facility]  Inpatient Services  Wound and Skin Care     Admission Date: 2021     Last order of IP CONSULT TO WOUND CARE was found on 2021 from Hospital Encounter on 2021     HPI, PMH, SH: Reviewed    Past Surgical History:   Procedure Laterality Date   • KNEE AMPUTATION BELOW Left 2021    Procedure: AMPUTATION, BELOW KNEE.;  Surgeon: Jarett Márquez M.D.;  Location: SURGERY [facility];  Service: Orthopedics   • KNEE AMPUTATION BELOW Right 2020    Procedure: AMPUTATION, BELOW KNEE ...;  Surgeon: Leobardo Lynn M.D.;  Location: SURGERY [facility];  Service: Orthopedics   • TOE AMPUTATION Right 2020    Procedure: AMPUTATION, TOE GREAT;  Surgeon: Leobardo Lynn M.D.;  Location: SURGERY [facility];  Service: Orthopedics   • TOE AMPUTATION Left 2020    Procedure: AMPUTATION, TOE GREAT;  Surgeon: Leobardo Lynn M.D.;  Location: [facility];  Service: Orthopedics   • TENOTOMY Left 2020    Procedure: FLEXOR TENOTOMIES, TWO-FIVE TOES;  Surgeon: Leobardo Lynn M.D.;  Location: SURGERY [facility];  Service: Orthopedics   • APPENDECTOMY     • OTHER CARDIAC SURGERY      stents x1   • OTHER ORTHOPEDIC SURGERY      knee surgery     Social History     Tobacco Use   • Smoking status: Former Smoker     Packs/day: 1.00     Years: 55.00     Pack years: 55.00     Types: Cigarettes     Quit date: 2014     Years since quittin.3   • Smokeless tobacco: Never Used   Substance Use Topics   • Alcohol use: Not Currently     Chief Complaint   Patient presents with   • Weakness     x2 days   • Dizziness   • Hypotension     per dialysis clinic     Diagnosis: Cellulitis of left foot [L03.116]    Unit where seen by Wound Team: T302/     WOUND CONSULT/FOLLOW UP RELATED TO:  sacrum    WOUND HISTORY:  Pt presented to ED with worsenng left foot discoloration and pain.  Pt has a R BKA.   Dr Encinas follows pt and determined pt is not a candidate for  revascularization.  Pt states that just prior to this admission he was spending most of the time in bed at home.  Transfers where a 'big production'    5/28/21 L BKA  with Dr Márquez   WOUND ASSESSMENT/LDA         Wound 05/26/21 Pressure Injury Sacrum Bilateral DTI 5/29/21 (Active)   Wound Image   05/29/21 1700   Site Assessment Purple 05/29/21 1700   Periwound Assessment Purple 05/29/21 1700   Margins Undefined edges 05/29/21 1700   Closure Secondary intention 05/29/21 1700   Drainage Amount None 05/29/21 1700   Treatments Offloading 05/29/21 1700   Wound Cleansing Approved Wound Cleanser 05/29/21 1700   Periwound Protectant Barrier Paste 05/28/21 2025   Dressing Cleansing/Solutions Not Applicable 05/28/21 2025   Dressing Options Mepilex 05/29/21 1700   Dressing Changed Changed 05/29/21 1700   Dressing Change/Treatment Frequency Every Shift, and As Needed 05/29/21 1700   NEXT Dressing Change/Treatment Date 05/29/21 05/29/21 1700   NEXT Weekly Photo (Inpatient Only) 06/05/21 05/29/21 1700   Pressure Injury Stage DTPI 05/29/21 1700   Wound Length (cm) 3 cm 05/29/21 1700   Wound Width (cm) 5 cm 05/29/21 1700   Wound Surface Area (cm^2) 15 cm^2 05/29/21 1700   Shape butterfly 05/29/21 1700   Wound Odor None 05/29/21 1700   Exposed Structures None 05/29/21 1700   WOUND NURSE ONLY - Time Spent with Patient (mins) 60 05/29/21 1700       Wound 05/28/21 Incision Leg Left adaptic, 4x4s, webril, ace (Active)   Site Assessment EMELIA 05/29/21 0849   Periwound Assessment EMELIA 05/29/21 0849   Margins EMELIA 05/29/21 0849   Closure EMELIA 05/28/21 2025   Drainage Amount Small 05/28/21 2025   Drainage Description Serosanguineous 05/28/21 2025   Treatments Site care;Offloading 05/28/21 2025   Wound Cleansing Not Applicable 05/28/21 2025   Periwound Protectant Not Applicable 05/28/21 2025   Dressing Cleansing/Solutions Not Applicable 05/28/21 2025   Dressing Options Ace Wrap 05/28/21 2025   Dressing Changed New 05/28/21 2025   Dressing  Status Clean;Dry;Intact 21   Dressing Change/Treatment Frequency As Needed 21 0849         Vascular:    PATSY:   PATSY Results, Last 30 Days US-PATSY SINGLE LEVEL BILAT    Result Date: 2021  Narrative  Vascular Laboratory  Conclusions  No flow by PPG 2-5th toes.  severely depressed PATSY dorsalis pedis, normal PATSY posterior tibial on left  side.  KLARISSA BRADSHAW  Age:    75    Gender:     M  MRN:    [id number]  :    1945      BSA:  Exam Date:     2021 10:13  Room #:     Inpatient  Priority:     Routine  Ht (in):             Wt (lb):  Ordering Physician:        GINGER FRAZIER  Referring Physician:       [id number] Miguel  Sonographer:               Asha Bowens  Study Type:                Limited Bilateral  Technical Quality:         Fair  Indications:     Type 2 diabetes mellitus with foot ulcer  CPT Codes:       40315  ICD Codes:       E11.621  History:         Right below the knee amputation, left big toe amputated, left                   fourth toe ischemia. Prior exam 20  Limitations:     Right below the knee amputation, left big toe amputated,                   unable to obtain left brachial pressure due to AVF, bandage                   over the right common femoral.                 RIGHT  Waveform            Systolic BPs (mmHg)                             120           Brachial                                           Common Femoral                                           Posterior Tibial                                           Dorsalis Pedis                                           Peroneal                                           PATSY                                           TBI                       LEFT  Waveform        Systolic BPs (mmHg)                                           Brachial  Triphasic                                Common Femoral  Monophasic                 127            Posterior Tibial  Monophasic                 45            Dorsalis Pedis                                           Peroneal                             1.06          PATSY                                           TBI  Findings  Right-  Ankle pressure not obtained due to below the knee amputation.  Bandage over the common femoral artery.  Doppler waveform of the popliteal artery is of high amplitude and  triphasic.  Left-  Ankle-brachial index of the dorsalis pedis artery is severely reduced.  Ankle-brachial index of the posterior tibial artery is normal.  Doppler waveform of the common femoral artery is of high amplitude and  triphasic.  Doppler waveforms at the ankle are monophasic with moderate amplitude.  PPG's-  No flow  Bilateral arterial duplex scan was performed in accordance with lower  extremity arterial evaluation protocol - see separate report.  Mark Rodriguez MD  (Electronically Signed)  Final Date:      21 May 2021 15:25    PATSY Results, Last 30 Days US-EXTREMITY ARTERY LOWER BILAT    Result Date: 2021  Narrative Lower Extremity  Arterial Duplex Report  Vascular Laboratory  CONCLUSIONS  Left below knee diffuse disease, absent flow in peroneal artery.  KLARISSA BRADSHAW  Exam Date:     2021 12:09  Room #:     Inpatient  Priority:     Routine  Ht (in):             Wt (lb):  Ordering Physician:        GINGER FRAZIER  Referring Physician:       [id number] Miguel  Sonographer:               Asha Bowens  Study Type:                Limited Bilateral  Technical Quality:         Adequate  Age:    75    Gender:     M  MRN:    [id number]  :    1945      BSA:  Indications:     Type 2 diabetes mellitus with foot ulcer  CPT Codes:       85373  ICD Codes:       E11.621  History:         Right below the knee amputation, left big toe amputated,                   fourth toe ischemia. Prior exam 20.  Limitations:     Rigth below the knee amputation.                 RIGHT  Waveform        Peak Systolic Velocity (cm/s)                  Prox    Prox-Mid  Mid    Mid-Dist  Distal  Triphasic                         52                       CFA  Biphasic        55                                         PFA  Biphasic        44                65      24       56      SFA  Biphasic                          35                       POP  Monophasic      62                                         AT  Monophasic      51                                         PT  Not                                                        LISA  attained                LEFT  Waveform        Peak Systolic Velocity (cm/s)                  Prox    Prox-Mid  Mid    Mid-Dist  Distal  Triphasic                         132                      CFA  Biphasic        43                                         PFA  Biphasic        50                62               42      SFA  Triphasic                         56                       POP  Monophasic      48                                 70      AT  Monophasic      37                                 30      PT  Absent          0                                  54      LISA  FINDINGS  Right-  Calcific atherosclerotic plaque seen throughout the lower extremity.  Stenosis of the distal superficial femoral artery. Velocities are  consistent with 50-75% stenosis.  Proximal anterior and posterior tibial artery waveforms are monophasic.  Proximal peroneal artery was not visualized.  Left-  Calcific atherosclerotic plaque seen throughout the lower extremity.  Monophasic waveforms seen in the anterior and posterior tibial arteries.  Absent flow seen in the proximal peroneal artery.  Mark Rodriguez MD  (Electronically Signed)  Final Date:      21 May 2021 14:42      Lab Values:    Lab Results   Component Value Date/Time    WBC 10.1 05/29/2021 05:08 AM    RBC 2.30 (L) 05/29/2021 05:08 AM    HEMOGLOBIN 7.5 (L) 05/29/2021 05:08 AM    HEMATOCRIT 23.6 (L) 05/29/2021 05:08  AM    CREACTPROT 24.11 (H) 11/13/2020 10:28 PM    SEDRATEWES >120 (H) 11/13/2020 10:28 PM    HBA1C 5.7 (H) 05/11/2021 06:30 AM        Culture Results show:  Recent Results (from the past 720 hour(s))   CULTURE WOUND W/ GRAM STAIN    Collection Time: 05/20/21  7:59 AM    Specimen: Left Foot; Wound   Result Value Ref Range    Significant Indicator POS (POS)     Source WND     Site LEFT FOOT     Culture Result Light growth usual skin xander. (A)     Gram Stain Result       Rare WBCs.  Many Gram positive cocci.  Few Gram negative rods.      Culture Result (A)      Klebsiella pneumoniae ESBL  Moderate growth  Extended Spectrum Beta-lactamase (ESBL) isolated.  ESBL's may be clinically resistant to therapy with  Penicillins,Cephalosporins or Aztreonam despite  apparent in vitro susceptibility to some of these agents.  The patient requires contact isolation.  Please contact pharmacy or an Infectious Disease Specialist  if you have any questions about appropriate therapy.      Culture Result Proteus mirabilis  Light growth   (A)     Culture Result (A)      Staphylococcus aureus  Moderate growth  This isolate is presumed to be clindamycin resistant based on  detection of inducible resistance.  Clindamycin may still  be effective in some patients.         Susceptibility    Proteus mirabilis - BINU     Ceftriaxone <=1 Sensitive mcg/mL     Cefazolin <=2 Sensitive mcg/mL     Ciprofloxacin <=0.25 Sensitive mcg/mL     Cefepime <=2 Sensitive mcg/mL     Cefuroxime <=4 Sensitive mcg/mL     Ertapenem <=0.5 Sensitive mcg/mL     Gentamicin <=2 Sensitive mcg/mL     Moxifloxacin <=2 Sensitive mcg/mL     Pip/Tazobactam <=8 Sensitive mcg/mL     Trimeth/Sulfa <=0.5/9.5 Sensitive mcg/mL     Tobramycin <=2 Sensitive mcg/mL     Ampicillin <=8 Sensitive mcg/mL    Staphylococcus aureus - BINU     Cefazolin <=8 Sensitive mcg/mL     Cefepime <=4 Sensitive mcg/mL     Ampicillin/sulbactam <=8/4 Sensitive mcg/mL     Pip/Tazobactam <=8 Sensitive mcg/mL      Vancomycin 1 Sensitive mcg/mL     Trimeth/Sulfa <=0.5/9.5 Sensitive mcg/mL     Tetracycline <=4 Sensitive mcg/mL     Azithromycin >4 Resistant mcg/mL     Clindamycin <=0.25 Resistant mcg/mL     Ceftaroline <=0.5 Sensitive mcg/mL     Daptomycin <=0.5 Sensitive mcg/mL     Erythromycin >4 Resistant mcg/mL     Oxacillin 0.5 Sensitive mcg/mL    Klebsiella pneumoniae esbl - BINU     Ceftriaxone >32 Extended Spectrum Beta Lactamase mcg/mL     Cefazolin >16 Resistant mcg/mL     Ciprofloxacin >2 Resistant mcg/mL     Cefepime >16 Resistant mcg/mL     Cefuroxime >16 Resistant mcg/mL     Ertapenem <=0.5 Sensitive mcg/mL     Gentamicin <=2 Sensitive mcg/mL     Moxifloxacin 4 Intermediate mcg/mL     Ampicillin/sulbactam 16/8 Intermediate mcg/mL     Pip/Tazobactam <=8 Sensitive mcg/mL     Trimeth/Sulfa >2/38 Resistant mcg/mL     Tigecycline <=2 Sensitive mcg/mL     Tobramycin <=2 Sensitive mcg/mL       Pain Level/Medicated:  No report of pain at sacrum       INTERVENTIONS BY WOUND TEAM:   Performed standard wound care which includes appropriate positioning, dressing removal and non-selective debridement. Pictures and measurements obtained weekly if/when required.    Cleansed with:  damp cloth  Sharp debridement: na  Genesis wound: Cleansed with damp cloth  Primary Dressing: mepilex  Secondary (Outer) Dressing:   TAPS, GELA, avoid supine - sign posted at [facility]    Interdisciplinary consultation: Patient, Bedside RN My, manager Divya MONTOYA, Bobbi Benedict RN, spoke to wife about todays presentation and its implications and that it is expected that this wound may evolve into an open wound - wife was understanding.      EVALUATION / RATIONALE FOR TREATMENT:  Most Recent Date:  3/29/21 sacral discoloration has delineated and is a DTI.  Off loading measures are in place  5/27/21 pt with discoloration around sacrum.  Pt's mobility is compromised although he was able to move in bed with minimal assistance.  Pt appears to have dusky  discoloration several areas of the body including L foot, B hands.  Pt has known cardiac disease, PAD, and sleep apnea in addition to DM and HTN all affecting his tissue perfusion.       Goals: Steady decrease in wound area and depth weekly.    WOUND TEAM PLAN OF CARE ([X] for frequency of wound follow up,):   Nursing to follow orders written for wound care. Contact wound team if area fails to progress, deteriorates or with any questions/concerns  Dressing changes by wound team:                   Follow up 3 times weekly:                NPWT change 3 times weekly:     Follow up 1-2 times weekly:    X - nursing to do dialy dressing and checks, wound will follow up on wound progress 1x/week  Follow up Bi-Monthly:                   Follow up as needed:    Other (explain):     NURSING PLAN OF CARE ORDERS (X):  Dressing changes: See Dressing Care orders: X  Skin care: See Skin Care orders: x  RN Prevention Protocol: X  Rectal tube care: See Rectal Tube Care orders:   Other orders:    RSKIN:   CURRENTLY IN PLACE (X), APPLIED THIS VISIT (A), ORDERED (O):   Q shift Kwame:  X  Q shift pressure point assessments:  X    Surface/Positioning   Pressure redistribution mattress            Low Airloss     X     Bariatric foam      Bariatric GELA     Waffle cushion      X  Waffle Overlay          Reposition q 2 hours   X   TAPs Turning system   X  Z Shad Pillow     Offloading/Redistribution   Sacral Mepilex (Silicone dressing)  X  Heel Mepilex (Silicone dressing)         Heel float boots (Prevalon boot)             Float Heels off Bed with Pillows           Respiratory   Silicone O2 tubing         Gray Foam Ear protectors   X  Cannula fixation Device (Tender )          High flow offloading Clip    Elastic head band offloading device      Anchorfast                                                         Trach with Optifoam split foam             Containment/Moisture Prevention    Rectal tube or BMS    Purwick/Condom Cath           Tomas Catheter    Barrier wipes           Barrier paste       Antifungal tx      Interdry        Mobilization       Up to chair        Ambulate      PT/OT  X    Nutrition       Dietician   On board - saw pt 5/27/21    Diabetes Education      PO     TF     TPN     NPO   # days     Other        Anticipated discharge plans:   LTACH:        SNF/Rehab:     X pt will need ongoing care to sacrum             Home Health Care:           Outpatient Wound Center:            Self/Family Care:        Other:

## 2021-05-30 NOTE — PROGRESS NOTES
2 RN Skin Check    2 RN skin check complete with JAN Palmer.  Devices in place: Nasal Cannula.  Skin assessed under devices: yes.  Confirmed pressure ulcers found on: sacrum that is pre-existing.  New potential pressure ulcers noted on N/A.  Wound consult placed Yes.  The following interventions in place Mepilex, Q2 turns in place, patient on low air loss mattress. TAPS in use.    Patient has dressing in place for LLE. Dressing is clean, dry and intact.  Patient has previous right BKA that is ALHAJI.  Bruising noted on RUE.

## 2021-05-30 NOTE — THERAPY
Speech Language Pathology   Clinical Swallow Evaluation     Patient Name: Luis Sepulveda  AGE:  75 y.o., SEX:  male  Medical Record #: [id number]  Today's Date: 5/30/2021     Precautions  Precautions: Fall Risk  Comments: Franklin BRANNON, old R CLOVIS    Assessment    Patient is 75 y.o. male admitted 5/20/2021 with weakness and dizziness. PMHX of end-stage renal disease on dialysis, peripheral vascular disease status post right below the knee amputation, insulin-dependent diabetes mellitus    The patient was seen on this date for a clinical swallow evaluation. The patient completed an oral mechanism exam which revealed no gross motor deficits. Patient noted to be edentulous on top with dentures at bedside. Patient declined to put dentures in at this time and reports he is able to consume regular textures without dentures. Vocal quality was clear and volition cough was strong. The patient was noted to have a strong productive reflexive cough prior to PO intake. The patient was given PO trials of thin liquids, liquidized, soft and bite sized textures and regular textures. The patient consumed all textures with no overt s/sx of aspiration. Mastication was slightly prolonged but remained functional. Recommend initiation of regular texture diet with thin liquids.    Plan    Recommend Speech Therapy for Evaluation only for the following treatments:  Dysphagia Training.    Discharge Recommendations: Anticipate that the patient will have no further speech therapy needs after discharge from the hospital     Objective       05/30/21 1516   Oral Motor Eval    Is Patient Able to Complete Oral Motor Eval Yes, Within Normal Limits   Laryngeal Function   Voice Quality Within Functional Limits   Volutional Cough Within Functional Limits   Excursion Upon Swallow Complete   Oral Food Presentation   Ice Chips Within Functional Limits   Single Swallow Thin (0) Within Functional Limits   Serial Swallow Thin (0) Within Functional Limits    Liquidised (3) Within Functional Limits   Soft & Bite-Sized (6) - (Dysphagia III) Within Functional Limits   Regular (7) Within Functional Limits   Self Feeding Independent   Tracheostomy   Tracheostomy  No   Dysphagia Strategies / Recommendations   Strategies / Interventions Recommended (Yes / No) Yes   Compensatory Strategies None   Diet / Liquid Recommendation Thin (0);Regular (7)   Medication Administration  Whole with Liquid Wash   Therapy Interventions No Swallowing Intervention Required   Dysphagia Rating   Nutritional Liquid Intake Rating Scale Non thickened beverages   Nutritional Food Intake Rating Scale Total oral diet with no restrictions

## 2021-05-30 NOTE — ASSESSMENT & PLAN NOTE
Requiring 4 L nasal cannula with a saturation  Secondary to fluid overload from not completing dialysis  Nephrology following, patient for no trial of hemodialysis today  Patient also states he has a remote history of COPD, pending evaluation by respiratory therapy

## 2021-05-30 NOTE — CARE PLAN
Problem: Pain - Standard  Goal: Alleviation of pain or a reduction in pain to the patient’s comfort goal  Outcome: Progressing     Problem: Knowledge Deficit - Standard  Goal: Patient and family/care givers will demonstrate understanding of plan of care, disease process/condition, diagnostic tests and medications  Outcome: Met     Problem: Fall Risk  Goal: Patient will remain free from falls  Outcome: Met   The patient is Watcher - Medium risk of patient condition declining or worsening    Shift Goals  Clinical Goals: Bring O2 levels up. Swallow evaluation  Patient Goals: Pain mangement, swallow foods/liquids safely    Progress made toward(s) clinical / shift goals:  Pending Swallow eval per SLP. Patient is NPO right now. Patient verbalized understanding of this.    Patient is not progressing towards the following goals:

## 2021-05-31 NOTE — PROGRESS NOTES
This HD RN came in to pt's bedside at 1600 and tried to cannulate left upper arm AVF using 15 gauge needles x 2 on arterial access with no success. Presence of blood back flow that stops when tape was used and on the second site, the blood flow stops when needle inserted further in. Very faint thrill and bruit noted on both sites. Dr Ortega and Dr Hodges are both notified, since pt's potassium is at 4.3 and no SOB, both doctors' said the pt will need fistulogram in AM. Dr Ortega will message the hospitalist about pt's access. Explained to the pt and his wife about the plan of care. Called Charge RN as well and notified Vane, primary RN.

## 2021-05-31 NOTE — THERAPY
Missed Therapy     Patient Name: Luis Sepulveda  Age:  75 y.o., Sex:  male  Medical Record #: [id number]  Today's Date: 5/31/2021    Discussed missed therapy with RN    Attempted to see pt for therapy. Pt currently receiving dialysis. Will continue to follow and re-attempt as able.

## 2021-05-31 NOTE — PROGRESS NOTES
2 RN skin check complete with JAN Gee.  Devices in place: Nasal Cannula.  Skin assessed under devices: yes.  Confirmed pressure ulcers found on: sacrum that is pre-existing.  New potential pressure ulcers noted on N/A.  Wound consult placed Yes.  The following interventions in place Mepilex, Q2 turns in place, patient on low air loss mattress. TAPS in use.     Patient has dressing in place for LLE. Dressing is clean, dry and intact.  Patient has previous right BKA that is ALHAJI.  Bruising noted on RUE.

## 2021-05-31 NOTE — CARE PLAN
Problem: Pain - Standard  Goal: Alleviation of pain or a reduction in pain to the patient’s comfort goal  Outcome: Progressing     Problem: Skin Integrity  Goal: Skin integrity is maintained or improved  Outcome: Progressing   The patient is Watcher - Medium risk of patient condition declining or worsening    Shift Goals  Clinical Goals: pain management,  Patient Goals: Pain management    Progress made toward(s) clinical / shift goals:  PRN pain meds in use. Patient using IS. Monitoring O2 levels.    Patient is not progressing towards the following goals:

## 2021-05-31 NOTE — PROGRESS NOTES
" [facility]  PROGRESS NOTE    HPI:  Luis Sepulveda is a 75 y.o.  with a past medical history that includes type 2 diabetes,  ESRD on hemodialysis, PAD, Afib, L great toe amputation with tenotomy, HLD, s/p right BKA (12/2020 with Dr. Lynn) admitted 5/20/2021 for Cellulitis of left foot [L03.116].   LPS has been consulted for evaluation of necrotic left 4th toe. Patient reports he has occasional \"twinges\" of pain to left 4-5 toes since approximately early April 2021. He states he was unaware of any wounds or discoloration of his left toes. He states he was discharged from rehab in early April 2020 and has a home health Rn that comes to his home weekly every Friday. He states the home health RN had provided him with a mirror to check his feet but he is unable to adequately check his feet due to vision deterioration. Pain is worse with touch, relieved with rest and non-weight bearing. Denies fever, chills, nausea, vomiting, CP, HA, SOB.       Surgery date: 5/28/2021 with Dr. Márquez  Procedure: left below the knee amputation      INTERVAL HISTORY:  5/23/2021: Patient denies fevers, chills, nausea, vomiting.  Pain well controlled.   5/25/21: receiving HD at bedside. Seen by Dr. Encinas, no plans for revascularization or major vascular surgery.  Patient experiencing significant pain to left foot with light touch, describes pain as a \"vice\" around foot.  Per RN, BP low, concerns for sepsis, given fluid bolus and now to be restarted on antibiotics  5/30/2021: POD #2 s/p left BKA.  Hemovac drain with minimal output and was subsequently removed.  Incision is well approximated with sutures, mild edema, very mild incisional necrosis at medial and distal ends of incision but otherwise appears to be healing well.  Patient reports neuropathic pain.  Denies fever, chills, nausea, vomiting, chest pain, headache, shortness of breath.      PERTINENT LPS RESULTS:   OR pathology from 5/28/2021: Pending  OR " cultures from 5/28/2021 + ESBL    COVID-19: Not detected this admission     X-ray:      Dx-foot-2-left 5/20/2021     FINDINGS:  Prior partial amputation of great toe at the base of proximal phalanx.  Soft tissue swelling of 4th toe.  No soft tissue gas.  Diffuse vascular calcifications.  No focal bony destruction.  Calcaneal enthesophytes.  Calcification dorsal to calcaneus, possibly within Achilles tendon, chronic.     IMPRESSION:     1.  Soft tissue swelling of LEFT 4th toe.  2.  No gross bony destruction however osteomyelitis is not excluded by plain film.  3.  Prior partial amputation of great toe.        MRI: none this admission     Arterial studies:   US-PATSY single level bilateral 5/21/2021                     RIGHT      Waveform            Systolic BPs (mmHg)                              120           Brachial                                            Common Femoral                                            Posterior Tibial                                            Dorsalis Pedis                                            Peroneal                                            PATSY                                            TBI                           LEFT   Waveform        Systolic BPs (mmHg)                                            Brachial   Triphasic                                Common Femoral   Monophasic                 127           Posterior Tibial   Monophasic                 45            Dorsalis Pedis                                            Peroneal                              1.06          PATSY                                            TBI         Findings   Right-   Ankle pressure not obtained due to below the knee amputation.    Bandage over the common femoral artery.    Doppler waveform of the popliteal artery is of high amplitude and    triphasic.       Left-   Ankle-brachial index of the dorsalis pedis artery is severely reduced.    Ankle-brachial index of the posterior tibial artery  is normal.    Doppler waveform of the common femoral artery is of high amplitude and    triphasic.    Doppler waveforms at the ankle are monophasic with moderate amplitude.       PPG's-   No flow       Bilateral arterial duplex scan was performed in accordance with lower    extremity arterial evaluation protocol - see separate report.        US-extremity artery lower bilateral 5/21/2021      RIGHT   Waveform        Peak Systolic Velocity (cm/s)                   Prox    Prox-Mid  Mid    Mid-Dist  Distal   Triphasic                         52                       CFA         Biphasic        55                                         PFA         Biphasic        44                65      24       56      SFA         Biphasic                          35                       POP         Monophasic      62                                         AT         Monophasic      51                                         PT         Not                                                        LISA   attained                    LEFT   Waveform        Peak Systolic Velocity (cm/s)                   Prox    Prox-Mid  Mid    Mid-Dist  Distal   Triphasic                         132                      CFA         Biphasic        43                                         PFA         Biphasic        50                62               42      SFA         Triphasic                         56                       POP         Monophasic      48                                 70      AT         Monophasic      37                                 30      PT         Absent          0                                  54      LISA               FINDINGS   Right-   Calcific atherosclerotic plaque seen throughout the lower extremity.    Stenosis of the distal superficial femoral artery. Velocities are    consistent with 50-75% stenosis.    Proximal anterior and posterior tibial artery waveforms are monophasic.    Proximal peroneal artery was  "not visualized.       Left-   Calcific atherosclerotic plaque seen throughout the lower extremity.   Monophasic waveforms seen in the anterior and posterior tibial arteries.   Absent flow seen in the proximal peroneal artery.         EXAM:      /50   Pulse 84   Temp 37.1 °C (98.8 °F) (Temporal)   Resp 16   Ht 1.676 m (5' 6\")   Wt 92.5 kg (203 lb 14.8 oz)   SpO2 90%   BMI 32.91 kg/m²     Pedal Pulses: Unable to palpate left DP/PT pulses, soft monophasic DP/PT pulses via Doppler  Faintly palpable popliteal bilaterally  Sensation: Hypersensitive with light touch to left foot      Wound :  Left BKA  Incision well approximated with sutures.  Mild edema.  Mild incisional necrosis at both medial and lateral aspects of incision.  There is tenderness to palpation.  No erythema, drainage, fluctuance, odor.  Hemovac insertion site to left lateral stump with minimal output- Removed with ease pressure dressing applied.    Left BKA medial    Left BKA distal    Left BKA lateral        Wound care, APRN.    Left BKA stump: Cleanse normal saline or wound cleanser, pat dry.  Apply single layer Adaptic, gauze, secure with roll gauze and Hypafix tape.  Ace wrap for compression.  Apply pressure dressing to left BKA stump Hemovac insertion site.  Pain is daily or as needed for saturation or dislodgment.              DIABETES MANAGEMENT:    Blood glucose:   Results from last 7 days   Lab Units 05/30/21  1807 05/30/21  1347 05/30/21  0915 05/30/21  0856 05/29/21  2246 05/29/21  1829 05/29/21  1306 05/29/21  0850   ACCU CHECK GLUCOSE 788 mg/dL 105* 100* 88 63* 120* 141* 82 97     A1c:   Lab Results   Component Value Date/Time    HBA1C 5.7 (H) 05/11/2021 06:30 AM            INFECTION MANAGEMENT:    Results from last 7 days   Lab Units 05/30/21  0538 05/29/21  0508 05/28/21  0637 05/27/21  2311 05/27/21  1524 05/27/21  0348   WBC 1501 K/uL 9.6 10.1 14.1* 15.6* 14.0* 13.4*   PLATELET COUNT 1518 K/uL 329 306 288 284 268 233     " "    Wound culture results:   Results     Procedure Component Value Units Date/Time    Blood Culture [[id number]]     Order Status: No result Specimen: Blood from Peripheral     Blood Culture [[id number]]     Order Status: No result Specimen: Blood from Peripheral     BLOOD CULTURE [[id number]] Collected: 05/20/21 0740    Order Status: Completed Specimen: Blood from Peripheral Updated: 05/25/21 0900     Significant Indicator NEG     Source BLD     Site PERIPHERAL     Culture Result No growth after 5 days of incubation.    Narrative:      Per Hospital Policy: Only change Specimen Src: to \"Line\" if  specified by physician order.  No site indicated    BLOOD CULTURE [[id number]] Collected: 05/20/21 0748    Order Status: Completed Specimen: Blood from Peripheral Updated: 05/25/21 0900     Significant Indicator NEG     Source BLD     Site PERIPHERAL     Culture Result No growth after 5 days of incubation.    Narrative:      Per Hospital Policy: Only change Specimen Src: to \"Line\" if  specified by physician order.  No site indicated    CULTURE WOUND W/ GRAM STAIN [[id number]]  (Abnormal)  (Susceptibility) Collected: 05/20/21 0759    Order Status: Completed Specimen: Wound from Left Foot Updated: 05/24/21 0710     Significant Indicator POS     Source WND     Site LEFT FOOT     Culture Result Light growth usual skin xander.     Gram Stain Result Rare WBCs.  Many Gram positive cocci.  Few Gram negative rods.       Culture Result Klebsiella pneumoniae ESBL  Moderate growth  Extended Spectrum Beta-lactamase (ESBL) isolated.  ESBL's may be clinically resistant to therapy with  Penicillins,Cephalosporins or Aztreonam despite  apparent in vitro susceptibility to some of these agents.  The patient requires contact isolation.  Please contact pharmacy or an Infectious Disease Specialist  if you have any questions about appropriate therapy.        Proteus mirabilis  Light growth        Staphylococcus aureus  Moderate growth  This isolate is " presumed to be clindamycin resistant based on  detection of inducible resistance.  Clindamycin may still  be effective in some patients.      Narrative:      CALL  [facility]  131 tel. [phone],  CALLED  131 tel. [phone] 05/22/2021, 09:32, RB PERF. RESULTS CALLED TO: RN  [phone]    Susceptibility     Klebsiella pneumoniae esbl (1)     Antibiotic Interpretation Microscan Method Status    Ceftriaxone Extended Spectrum Beta Lactamase >32 mcg/mL BINU Final    Cefazolin Resistant >16 mcg/mL BINU Final    Ciprofloxacin Resistant >2 mcg/mL BINU Final    Cefepime Resistant >16 mcg/mL BINU Final    Cefuroxime Resistant >16 mcg/mL BINU Final    Ertapenem Sensitive <=0.5 mcg/mL BINU Final    Gentamicin Sensitive <=2 mcg/mL BINU Final    Moxifloxacin Intermediate 4 mcg/mL BINU Final    Ampicillin/sulbactam Intermediate 16/8 mcg/mL BINU Final    Pip/Tazobactam Sensitive <=8 mcg/mL BINU Final    Trimeth/Sulfa Resistant >2/38 mcg/mL BINU Final    Tigecycline Sensitive <=2 mcg/mL BINU Final    Tobramycin Sensitive <=2 mcg/mL BINU Final          Proteus mirabilis (2)     Antibiotic Interpretation Microscan Method Status    Ceftriaxone Sensitive <=1 mcg/mL BINU Final    Cefazolin Sensitive <=2 mcg/mL BINU Final    Ciprofloxacin Sensitive <=0.25 mcg/mL BINU Final    Cefepime Sensitive <=2 mcg/mL BINU Final    Cefuroxime Sensitive <=4 mcg/mL BINU Final    Ertapenem Sensitive <=0.5 mcg/mL BINU Final    Gentamicin Sensitive <=2 mcg/mL BINU Final    Moxifloxacin Sensitive <=2 mcg/mL BINU Final    Pip/Tazobactam Sensitive <=8 mcg/mL BINU Final    Trimeth/Sulfa Sensitive <=0.5/9.5 mcg/mL BINU Final    Tobramycin Sensitive <=2 mcg/mL BINU Final    Ampicillin Sensitive <=8 mcg/mL BINU Final          Staphylococcus aureus (3)     Antibiotic Interpretation Microscan Method Status    Cefazolin Sensitive <=8 mcg/mL BINU Final    Cefepime Sensitive <=4 mcg/mL BINU Final    Ampicillin/sulbactam Sensitive <=8/4 mcg/mL BINU Final    Pip/Tazobactam Sensitive <=8 mcg/mL BINU Final     Vancomycin Sensitive 1 mcg/mL BINU Final    Trimeth/Sulfa Sensitive <=0.5/9.5 mcg/mL BINU Final    Tetracycline Sensitive <=4 mcg/mL BINU Final    Azithromycin Resistant >4 mcg/mL BINU Final    Clindamycin Resistant <=0.25 mcg/mL BINU Final    Ceftaroline Sensitive <=0.5 mcg/mL BINU Final    Daptomycin Sensitive <=0.5 mcg/mL BINU Final    Erythromycin Resistant >4 mcg/mL BINU Final    Oxacillin Sensitive 0.5 mcg/mL BINU Final            Condensed View                                    ASSESSMENT/PLAN:   This is a 75-year-old male with a history of type 2 diabetes, PAD, ESRD on HD, right BKA, recent NSTEMI, EF 15-20%, left anterior tibial artery angioplasty, percutaneous thrombectomy of left peroneal artery, left peroneal artery angioplasty with Dr. Encinas 04/2021.    Patient is POD #2 s/p left BKA with Dr. Márquez.  Incision is well approximated with minimal incisional necrosis on both medial and lateral aspects of incision.  Mild edema is present but no erythema, drainage, fluctuance, odor.  Hemovac had minimal output and was removed.  Dressings applied to site.    Wound care:   Left BKA stump: Cleanse normal saline or wound cleanser, pat dry.  Apply single layer Adaptic, gauze, secure with roll gauze and Hypafix tape.  Ace wrap for compression.  Apply pressure dressing to left BKA stump Hemovac insertion site.  Pain is daily or as needed for saturation or dislodgment.    Labs/Imaging:  -COVID-19: not detected this admission on this admission 5/20/2021  -no further labs or imaging at this time      Vascular status:   -Faintly palpable popliteal pulses bilaterally  -Vascular surgery Dr. Encinas involved.  No plans for major revascularization surgery and no focal flow-limiting lesion was seen on duplex.  Vascular recommending amputation per LPS      Surgery:   None    Antibiotics:   -ID involved.  Now has signed off  Completed Zosyn IV 5/25/2021.    Weight Bearing Status:   -Non Weight bearing LLE    Offloading:   Foot  bilateral lower extremities with pillows.  Knee immobilizer to be ordered to prevent left knee contracture  -Orthotic company: [employer]    PT/OT :   Involved.  Last seen by PT 5/31/2021, OT 5/29/2021      Diabetes Education:   -A1c 5.7% on 5/11/2021  Diabetes educator not involved at this time    - Implications of loss of protective sensation (LOPS) discussed with patient- including increased risk for amputation.  Advised to check feet at least daily, moisturize feet, and to always wear protective foot wear.   -avoid trimming own nails. See podiatrist or certified foot and nail RN  -keep blood sugars <150 for improved wound healing        DISCHARGE PLAN:    Disposition:  Recommend rehab, SNF at discharge      Follow-up: Orthopedics in 3 weeks for suture removal.      Discussed with: pt, RN, Dr. Márquez     Please note that this dictation was created using voice recognition software. I have  worked with technical experts from [facility]  [employer] to optimize the interface.  I have made every reasonable attempt to correct obvious errors, but there may be errors of grammar and possibly content that I did not discover before finalizing the note.    Jessica Blackmon, A.P.R.N.    If any questions or concerns, please contact [facility] through voalte.

## 2021-05-31 NOTE — PROGRESS NOTES
[facility] Nephrology Daily Progress Note    Date of Service  5/31/2021    Author: Marvin Mahajan M.D.    Chief Complaint  74 y/o man with ESRD HD T,TH,Sat at [facility] presented with hypotension and inability to HD. On routine labs noted to have hyperkalemia.  Pt has increased erythema of 4th toe on left with some spreading erythema. Pt reports that this is worsening over the last few weeks, but though it secondary to trauma.     No F/C/N/V/CP/SOB.  No melena, hematochezia, hematemesis.  No HA, visual changes, or abdominal pain.    Daily Nephrology Summary:   5/20 - Consult done  5/21 - sitting up in bed, feeling good, appetite good, mild non-productive cough, tolerated HD yesterday UF: 158ml  5/22 - seen on HD, some hypotension this am, limited UF with HD, vascular to consult   5/23 - sitting up in bed, c/o severe pain in foot and L hand, vascular saw pt and plan poss intervention   5/24 - NAEO, feels good, having issues with dropping objects recently over the past 3-4 days  5/25 - NAEO, no complaints, sleeping comfortably  5/26 - NAEO, no complaints  5/27 - NAEO, no complaints, feels good  5/28 - NAEO, no complaints, scheduled for BKA today, family at bedside  5/29 - NAEO, no complaints today.  Per dialysis nurse clots on arterial line when initiating dialysis.  High arterial pressures.  5/30 - HD stopped early yesterday due to clotting, AVG U/S with patency  5/31 - NAEO, no complaints, feels good    Review of Systems  Review of Systems   Constitutional: Negative for chills and fever.   Respiratory: Negative for cough and shortness of breath.    Cardiovascular: Negative for chest pain and leg swelling.   Gastrointestinal: Negative for nausea and vomiting.   Neurological: Negative for seizures and loss of consciousness.   All other systems reviewed and are negative.    Physical Exam  Temp:  [36.1 °C (96.9 °F)-37.1 °C (98.8 °F)] 36.9 °C (98.4 °F)  Pulse:  [80-88] 80  Resp:  [16-17] 17  BP: (100-110)/(38-65)  101/40  SpO2:  [90 %-96 %] 96 %    Physical Exam  Vitals and nursing note reviewed.   Constitutional:       Appearance: Normal appearance.   HENT:      Head: Normocephalic and atraumatic.   Eyes:      General: No scleral icterus.        Right eye: No discharge.         Left eye: No discharge.   Cardiovascular:      Rate and Rhythm: Normal rate and regular rhythm.   Pulmonary:      Effort: Pulmonary effort is normal.      Breath sounds: Normal breath sounds.   Abdominal:      General: Bowel sounds are normal.      Palpations: Abdomen is soft.   Musculoskeletal:         General: Deformity (Left foot amputation) present. No tenderness.      Cervical back: Neck supple. No muscular tenderness.      Right lower leg: No edema.      Left lower leg: No edema.   Neurological:      Mental Status: He is alert. Mental status is at baseline.     Fluids    Intake/Output Summary (Last 24 hours) at 5/31/2021 1056  Last data filed at 5/31/2021 1000  Gross per 24 hour   Intake 240 ml   Output --   Net 240 ml     Laboratory  Recent Labs     05/29/21  0508 05/30/21  0538 05/31/21  0537   WBC 10.1 9.6 11.0*   RBC 2.30* 2.40* 2.42*   HEMOGLOBIN 7.5* 8.0* 7.9*   HEMATOCRIT 23.6* 24.6* 25.2*   .6* 102.5* 104.1*   MCH 32.6 33.3* 32.6   MCHC 31.8* 32.5* 31.3*   RDW 63.5* 60.6* 61.6*   PLATELETCT 306 329 383   MPV 9.4 9.4 9.5     Recent Labs     05/29/21  0508 05/30/21  0538 05/31/21  0537   SODIUM 133* 135 131*   POTASSIUM 4.9 4.3 4.6   CHLORIDE 97 96 94*   CO2 25 28 26   GLUCOSE 121* 88 104*   BUN 38* 35* 41*   CREATININE 6.16* 5.83* 6.99*   CALCIUM 8.7 8.7 8.8         No results for input(s): NTPROBNP in the last 72 hours.        Imaging  - Reviewed    Assessment  # ESRD   maint HD q  T/T/S via   AVG. Left arm (+thrill/bruit)   U/S was patent   High pressure alarms at Qb 300  # Hypotension: cardiogenic vs toe/cellultis   midodrine  # PVD with left 4th great toe discoloration   Per vascular    Non-healing and scheduled for BKA today  (5/28)  # Cardiomyopathy: acute worsening. EF now 15-20%  # 80% ostial LAD in-stent restenosis:   # Steal syndrome: s/p DRI. Worsening apparent ischmia in setting of heart failure  # Anemia in CKD   Iron sat okay / ferritin elevated   ARMANDO   # Atrial fibrillation   plavix    BB  # Diabetes mellitus  # Hyperthyroidism   Tapazole  # Hyponatremia, resolved    UF with HD   # Hyperkalemia, corrected    Manage with HD   # Hyperphosphatemia   sevelamer TID with meals   # BMD-CKD   Calcitriol   Sevelamer     PLAN:  - TTS iHD  - UF with HD as tolerated   - Consult Vascular tomorrow for evaluation of his AVF  - Continue binders  - Dose all meds per ESRD

## 2021-05-31 NOTE — PROGRESS NOTES
[facility] Medicine Daily Progress Note    Date of Service  5/31/2021    Chief Complaint  75 y.o. male admitted 5/20/2021 with weakness and dizziness    Hospital Course  75-year-old male with a past medical history of end-stage renal disease on dialysis, peripheral vascular disease status post right below the knee amputation, insulin-dependent diabetes mellitus, there was most recently admitted here from 5/10/2021 through 5/18/2021 when he underwent heart catheterization revealing ejection fraction of 15 to 20% with 80% ostial LAD in-stent restenosis and distal RCA disease was admitted on 5/20/2021 for cellulitis of the left fourth toe foot secondary to ischemic.    Patient was evaluated by vascular surgery which determined that patient was not a candidate for revascularization procedures.  Infectious disease following for which she was started on a short course of meropenem.  He underwent left BKA on 5/28/2021 which she tolerated well.  IV antibiotics discontinued by infectious disease shortly after.  Throughout hospital course, nephrology having difficulty with dialysis secondary to poor AV fistula flow.  Patient underwent arterial AV fistula Doppler which showed fistula is patent.  He underwent a venogram which showed...    Interval Problem Update  S/P  left BKA on 5/28  Patient evaluated bedside, and found afebrile in no acute distress  Above has been going on for about a week, keep n.p.o.  Requiring 4 L nasal cannula to maintain saturation,  No leukocytosis or signs of infection  5/29/2021 AVF U/S showing dense calcific plaque observed in the brachial, radial & distal ulnar, the DRIL graft is patent with no stenosis observed  Nephrology following, will give another trial of dialysis, recommending fistulogram, order placed  PT/OT recommending SNF, placed referral    Disposition  Pending SNF    Review of Systems  Review of Systems   Constitutional: Negative for fever.   Respiratory: Negative for cough, shortness of  breath and wheezing.    Cardiovascular: Negative for chest pain.   Gastrointestinal: Positive for diarrhea (loose Stools). Negative for abdominal pain, constipation, nausea and vomiting.   Genitourinary: Negative for dysuria.   Musculoskeletal: Positive for joint pain.   Neurological: Negative for headaches.   Psychiatric/Behavioral: The patient is nervous/anxious.         Physical Exam  Temp:  [36.1 °C (96.9 °F)-37.1 °C (98.8 °F)] 36.9 °C (98.4 °F)  Pulse:  [83-88] 88  Resp:  [16-17] 16  BP: (100-110)/(38-65) 104/38  SpO2:  [90 %-96 %] 96 %    Physical Exam  Constitutional:       General: He is not in acute distress.     Appearance: He is well-developed. He is obese. He is not diaphoretic.   HENT:      Head: Normocephalic and atraumatic.      Right Ear: External ear normal.      Left Ear: External ear normal.      Mouth/Throat:      Pharynx: No oropharyngeal exudate or posterior oropharyngeal erythema.   Eyes:      Extraocular Movements: Extraocular movements intact.   Cardiovascular:      Rate and Rhythm: Normal rate.      Heart sounds: No gallop.    Pulmonary:      Effort: Respiratory distress present.      Breath sounds: Rales present.   Abdominal:      General: There is no distension.      Tenderness: There is no abdominal tenderness.   Musculoskeletal:         General: Tenderness present.      Comments: Right BKA  Left BKA with tender surgical site   Skin:     General: Skin is warm.   Neurological:      Mental Status: He is alert and oriented to person, place, and time. Mental status is at baseline.      Motor: No weakness.   Psychiatric:         Mood and Affect: Mood normal.         Behavior: Behavior normal.         Fluids  No intake or output data in the 24 hours ending 05/31/21 0706    Laboratory  Recent Labs     05/29/21  0508 05/30/21  0538 05/31/21  0537   WBC 10.1 9.6 11.0*   RBC 2.30* 2.40* 2.42*   HEMOGLOBIN 7.5* 8.0* 7.9*   HEMATOCRIT 23.6* 24.6* 25.2*   .6* 102.5* 104.1*   MCH 32.6 33.3*  32.6   MCHC 31.8* 32.5* 31.3*   RDW 63.5* 60.6* 61.6*   PLATELETCT 306 329 383   MPV 9.4 9.4 9.5     Recent Labs     05/29/21  0508 05/30/21  0538   SODIUM 133* 135   POTASSIUM 4.9 4.3   CHLORIDE 97 96   CO2 25 28   GLUCOSE 121* 88   BUN 38* 35*   CREATININE 6.16* 5.83*   CALCIUM 8.7 8.7                   Imaging  DX-CHEST-LIMITED (1 VIEW)   Final Result      1.  Cardiomegaly is pulmonary vascular congestion and interstitial edema.   2.  Layering right pleural effusion with overlying atelectasis/consolidation.   3.  Trace pleural fluid versus pleural thickening at the left lung base.   4.  Left lung base calcification is again seen which may related to prior infection or trauma.         US-HEMODIALYSIS GRAFT DUPLEX COMP UPPER EXTREMITY   Final Result      US-EXTREMITY ARTERY LOWER BILAT   Final Result      US-PATSY SINGLE LEVEL BILAT   Final Result      DX-FOOT-2- LEFT   Final Result      1.  Soft tissue swelling of LEFT 4th toe.   2.  No gross bony destruction however osteomyelitis is not excluded by plain film.   3.  Prior partial amputation of great toe.      DX-CHEST-PORTABLE (1 VIEW)   Final Result      Left lower lobe pleural thickening and calcification may be related to sequelae of prior infection or trauma.      Patchy left basilar opacity may represent atelectasis or pneumonitis.      Mild cardiomegaly.      Atherosclerotic plaque.              Assessment/Plan  * Cellulitis of left foot- (present on admission)  Assessment & Plan  One dose of vancomycin given in the ER  On 5/22, foot wound culture grew ESBL  Wound culture growing Klebsiella pneumonia ESBL, but use me diabetes, and Staph aureus   Continue meropenem as per infectious disease recommendations  Orthopedic surgery following, planning for left BKA on a.m.  Resolved    Acute respiratory failure with hypoxia (HCC)  Assessment & Plan  Requiring 4 L nasal cannula with a saturation  Secondary to fluid overload from not completing dialysis  Nephrology  following, patient for no trial of hemodialysis today    Macrocytic anemia- (present on admission)  Assessment & Plan  Hemoglobin 6.5 today  No signs of GI bleed  Vit B12/folate unremarkable  Secondary to anemia of chronic kidney disease  Transfuse 1 unit of PRBC  Nephrology following, recommending iron repletion  Continue erythropoietin as per nephrology    Pulmonary edema  Assessment & Plan  Noted on 5/30/2021 chest x-ray  Complicated by acute hypoxic respiratory failure requiring 4 L nasal cannula  Likely secondary to fluid overload from difficulties accessing IV fistula/poor flow  Nephrology following, patient for another attempt of hemodialysis today  Labs on a.m.    S/P BKA (below knee amputation) unilateral, left (HCC)  Assessment & Plan  Status post left BKA on 5/28/2023 1  Scheduled and as needed pain mgmt  Orthopedic surgery following for post BKA care  Labs on AM    DNR (do not resuscitate)- (present on admission)  Assessment & Plan  Discussed with Mr. Sepulveda and he confirmed this status.    Ischemia of toe- (present on admission)  Assessment & Plan  S/P left BKA on 5/28  Resolved    Advance care planning- (present on admission)  Assessment & Plan  In a previous visit, he had indicated to palliative that he would consider hospice/comfort care  Currently, vascular recommend surgery and patient is agreeable  Consulted palliative care for further goals of care discussion after surgery    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)- (present on admission)  Assessment & Plan  Right BKA and left great toe amputation     Cardiomyopathy, ischemic- (present on admission)  Assessment & Plan  EF from heart cath 5/10 15-20%    Peripheral vascular disease (HCC)- (present on admission)  Assessment & Plan  Hx of multiple amputations     Hypotension- (present on admission)  Assessment & Plan  Chronic condition  Discontinue coreg  Continue home midodrine  Resolved    Paroxysmal A-fib (HCC)- (present  on admission)  Assessment & Plan  Chronic condition  Hold Eliquis in case he needs surgery and utilize SQ heparin for DVT prophylaxis    End stage renal disease on dialysis (HCC)- (present on admission)  Assessment & Plan  Dialysis via left arm fistula T,Th,S  Dr. Hodges, nephrology was consulted from the ER    CAD (coronary artery disease)- (present on admission)  Assessment & Plan  With hx of stents followed by [facility]    Hyponatremia- (present on admission)  Assessment & Plan  Mild, continue to monitor    Type 2 diabetes mellitus with kidney complication, with long-term current use of insulin (HCC)- (present on admission)  Assessment & Plan  With hyperglycemia  Continue sliding scale  Diabetic diet       VTE prophylaxis: Heparin

## 2021-05-31 NOTE — ASSESSMENT & PLAN NOTE
Noted on 5/30/2021 chest x-ray  Complicated by acute hypoxic respiratory failure requiring 4 L nasal cannula  Likely secondary to fluid overload from difficulties accessing IV fistula/poor flow  Nephrology following, patient for another attempt of hemodialysis today  Labs on a.m.

## 2021-05-31 NOTE — PROGRESS NOTES
2 RN Skin Check    2 RN skin check completed with JAN Puente.  Devices in place: Nasal Cannula.  Skin assessed under devices: yes.  Confirmed pressure ulcer found on sacrum. No change, skin intact and purple, no blanching. Mepilex in place.  No new pressure ulcers noted. Scattered bruising throughout body, including large bruise on L hip and several on R forearm. Elbows intact and blanching. Surgical dressing to L BKA  The following interventions in place Pillows, Mepilex, Lotion, Barrier cream, Heel float boots and Waffle bed overlay, Q2 turns, frequent Incontinence checks.

## 2021-05-31 NOTE — PROGRESS NOTES
"[facility] Services Progress Note     Hemodialysis treatment ordered today per Dr. SKIP Mahajan x 3 hours  Using G16 1\" sharp needle due to difficult cannulation and previous arterial access issues by dialysis nurse Sharon  Treatment initiated at 0942 ended at 1242.         Net UF 1500 mL.   Able to tolerate treatment with minimal UF due to low BP at 80's   arterial access pressure maintained at 170's-190's;   venous access pressure at 120's-130's during treatment    See paper flow sheet for details.     Needles removed from access site. Dressings applied and sites held x 10 minutes; verified no bleeding. Positive bruit/thrill post tx.   Staff RN to monitor AVF/G for breakthrough bleeding. Should breakthrough bleeding occur, staff RN to apply pressure to access sites until bleeding resolved.   Notify Dialysis and Nephrologist for follow-up.     Report given to Primary RN GORDON Poole.    "

## 2021-05-31 NOTE — DISCHARGE PLANNING
Anticipated Discharge Disposition:   iRF vs SNF    Action:   This RN CM is following the case. Received a call from [place]. Pt's wife who states that she and Pt prefers that Pt discharges to [facility]  and from there discharge to [facility].    Per [place] , Pt was at both Facilities before.  Pt is a confirmed Pt at [facility]. Johnny sched at 05:40 am  Per notes of Becca Bojorquez, Dialysis Coordinator.     Per [place], Pt still has some issues with his AV Fistula.    Pt has [place],wife and Son Miguel for support.       Barriers to Discharge:   Pending medical clearance  Pending discharge disposition       Plan:   Will continue to assist Pt with discharge as needed

## 2021-05-31 NOTE — CARE PLAN
The patient is Watcher - Medium risk of patient condition declining or worsening    Shift Goals  Clinical Goals: Increase O2 levels, pain management  Patient Goals: Comfort    Progress made toward(s) clinical / shift goals:  Pt reports pain but PRN meds are helping. Oxygen saturation has been improving,     Patient is not progressing towards the following goals: n/a      Problem: Pain - Standard  Goal: Alleviation of pain or a reduction in pain to the patient’s comfort goal  Outcome: Progressing     Problem: Skin Integrity  Goal: Skin integrity is maintained or improved  Outcome: Progressing     Problem: Knowledge Deficit - Diabetes  Goal: Patient will demonstrate knowledge of insulin injection, symptoms, and treatment of hypoglycemia and diet prior to discharge  Outcome: Progressing     Problem: Respiratory  Goal: Patient will achieve/maintain optimum respiratory ventilation and gas exchange  Outcome: Progressing

## 2021-05-31 NOTE — PROGRESS NOTES
Checked patients blood sugar this am and glucose monitor read BS of 36. Patient was asymptomatic at this time, just reported feeling tired. This RN re-checked patients blood sugar again on other hand and BS read 82. Will check patients blood sugar again for lunch time.

## 2021-05-31 NOTE — PROGRESS NOTES
Patients BP was 86/32. Patient states he has some lightheadedness. Hospitalist was notified and 1L of LR bolus was ordered.

## 2021-05-31 NOTE — PROGRESS NOTES
Surgery patient: Yes  Date of surgery: 5/28/21  Ambulated 50 ft on day of surgery? (N/A if patient did not undergo surgery today): NA  Number of times ambulated 50 feet or greater today: 0  Patient has been up to chair, edge of bed or HOB 90 degrees for all meals?: Yes  Goal met? (goal is ambulating at least 50 feet 2 times on day shift, one time on night shift): No  If patient did not meet mobility goal, why?: Patient has bilateral BKA. Patient is NWB to MARCY and COCO and is unable to ambulate at this time

## 2021-06-01 NOTE — PROGRESS NOTES
2 RN Skin Check    2 RN skin check completed with JAN Puente.  Devices in place: Nasal Cannula and .  Skin assessed under devices: yes.  Confirmed pressure ulcers found on sacrum. Small area purple and non-blanching, mepilex in place. Rest of sacrum slightly red but blanching.Scattered bruising throughout, notably on RUE and L waist/hip. Elbows intact and blanching. Surgical dressing to L BKA.   The following interventions in place Pillows, Mepilex and GELA mattress, TAPS system, Q2 turns, frequent incontinence checks.

## 2021-06-01 NOTE — PROGRESS NOTES
[facility] Nephrology Daily Progress Note    Date of Service  6/1/2021    Author: Marvin Mahajan M.D.    Chief Complaint  74 y/o man with ESRD HD T,TH,Sat at [place] presented with hypotension and inability to HD. On routine labs noted to have hyperkalemia.  Pt has increased erythema of 4th toe on left with some spreading erythema. Pt reports that this is worsening over the last few weeks, but though it secondary to trauma.     No F/C/N/V/CP/SOB.  No melena, hematochezia, hematemesis.  No HA, visual changes, or abdominal pain.    Daily Nephrology Summary:   5/20 - Consult done  5/21 - sitting up in bed, feeling good, appetite good, mild non-productive cough, tolerated HD yesterday UF: 158ml  5/22 - seen on HD, some hypotension this am, limited UF with HD, vascular to consult   5/23 - sitting up in bed, c/o severe pain in foot and L hand, vascular saw pt and plan poss intervention   5/24 - NAEO, feels good, having issues with dropping objects recently over the past 3-4 days  5/25 - NAEO, no complaints, sleeping comfortably  5/26 - NAEO, no complaints  5/27 - NAEO, no complaints, feels good  5/28 - NAEO, no complaints, scheduled for BKA today, family at bedside  5/29 - NAEO, no complaints today.  Per dialysis nurse clots on arterial line when initiating dialysis.  High arterial pressures.  5/30 - HD stopped early yesterday due to clotting, AVG U/S with patency  5/31 - NAEO, no complaints, feels good  6/01 - NAEO, no complaints, feels good, cannulation with 15G needles today    Review of Systems  Review of Systems   Constitutional: Negative for chills and fever.   Respiratory: Negative for cough and shortness of breath.    Cardiovascular: Negative for chest pain and leg swelling.   Gastrointestinal: Negative for nausea and vomiting.   Neurological: Negative for seizures and loss of consciousness.   All other systems reviewed and are negative.    Physical Exam  Temp:  [36.4 °C (97.5 °F)-36.9 °C (98.4 °F)] 36.4  °C (97.5 °F)  Pulse:  [77-90] 90  Resp:  [16-17] 17  BP: ()/(26-50) 108/47  SpO2:  [97 %-100 %] 97 %    Physical Exam  Vitals and nursing note reviewed.   Constitutional:       Appearance: Normal appearance.   HENT:      Head: Normocephalic and atraumatic.   Eyes:      General: No scleral icterus.        Right eye: No discharge.         Left eye: No discharge.   Cardiovascular:      Rate and Rhythm: Normal rate and regular rhythm.   Pulmonary:      Effort: Pulmonary effort is normal.      Breath sounds: Normal breath sounds.   Abdominal:      General: Bowel sounds are normal.      Palpations: Abdomen is soft.   Musculoskeletal:         General: Deformity (Left foot amputation) present. No tenderness.      Cervical back: Neck supple. No muscular tenderness.      Right lower leg: No edema.      Left lower leg: No edema.   Neurological:      Mental Status: He is alert. Mental status is at baseline.     Fluids    Intake/Output Summary (Last 24 hours) at 6/1/2021 1038  Last data filed at 6/1/2021 0926  Gross per 24 hour   Intake 500 ml   Output 3700 ml   Net -3200 ml     Laboratory  Recent Labs     05/30/21  0538 05/31/21  0537 06/01/21  0500   WBC 9.6 11.0* 11.1*   RBC 2.40* 2.42* 2.42*   HEMOGLOBIN 8.0* 7.9* 8.0*   HEMATOCRIT 24.6* 25.2* 25.0*   .5* 104.1* 103.3*   MCH 33.3* 32.6 33.1*   MCHC 32.5* 31.3* 32.0*   RDW 60.6* 61.6* 61.1*   PLATELETCT 329 383 361   MPV 9.4 9.5 9.1     Recent Labs     05/30/21  0538 05/31/21  0537 06/01/21  0500   SODIUM 135 131* 134*   POTASSIUM 4.3 4.6 4.6   CHLORIDE 96 94* 97   CO2 28 26 27   GLUCOSE 88 104* 104*   BUN 35* 41* 27*   CREATININE 5.83* 6.99* 5.21*   CALCIUM 8.7 8.8 8.7         No results for input(s): NTPROBNP in the last 72 hours.        Imaging  - Reviewed    Assessment  # ESRD   maint HD q  T/T/S via   AVG. Left arm (+thrill/bruit)   U/S was patent   AVG cannulated without issue today, seems to be dependent on BP, when good easy to cannulate  # Hypotension:  cardiogenic vs toe/cellultis   midodrine  # PVD with left 4th great toe discoloration   Per vascular    Non-healing and scheduled for BKA today (5/28)  # Cardiomyopathy: acute worsening. EF now 15-20%  # 80% ostial LAD in-stent restenosis:   # Steal syndrome: s/p DRI. Worsening apparent ischmia in setting of heart failure  # Anemia in CKD   Iron sat okay / ferritin elevated   ARMANDO   # Atrial fibrillation   plavix    BB  # Diabetes mellitus  # Hyperthyroidism   Tapazole  # Hyponatremia, resolved    UF with HD   # Hyperkalemia, corrected    Manage with HD   # Hyperphosphatemia   sevelamer TID with meals   # BMD-CKD   Calcitriol   Sevelamer     PLAN:  - TTS iHD  - UF with HD as tolerated   - No need for vascular eval at this time, will refer to AC as OP, unless further issues develop  - Continue binders  - Dose all meds per ESRD

## 2021-06-01 NOTE — DISCHARGE PLANNING
[facility] Transitional Care Coordination    Referral from:  Dr Gay   Insurance Provider on Facesheet:  Medicare   Potential Rehab Diagnosis:  Left below knee amputation    Chart review indicates patient does have on going medical management and does have  therapy needs to possibly meet inpatient rehab facility criteria with the goal of returning to community.    D/C support:    Patient lives with his ex-wife in a [facility] with no LIZ in [place]. +shower chair and +grab bars in place.  Family provides transportation to app and [facility].   PT was at [facility] in January - dc to SNF and then returned home.       Physiatry consultation forwarded per protocol.     Last Covid test:  5/27 not detected.      Thank you for the referral.

## 2021-06-01 NOTE — PROGRESS NOTES
[facility] Medicine Daily Progress Note    Date of Service  6/1/2021    Chief Complaint  75 y.o. male admitted 5/20/2021 with weakness and dizziness    Hospital Course  75-year-old male with a past medical history of end-stage renal disease on dialysis, peripheral vascular disease status post right below the knee amputation, insulin-dependent diabetes mellitus, there was most recently admitted here from 5/10/2021 through 5/18/2021 when he underwent heart catheterization revealing ejection fraction of 15 to 20% with 80% ostial LAD in-stent restenosis and distal RCA disease was admitted on 5/20/2021 for cellulitis of the left fourth toe foot secondary to ischemic.    Patient was evaluated by vascular surgery which determined that patient was not a candidate for revascularization procedures.  Infectious disease following for which she was started on a short course of meropenem.  He underwent left BKA on 5/28/2021 which she tolerated well.  IV antibiotics discontinued by infectious disease shortly after.  Throughout hospital course, nephrology having difficulty with dialysis secondary to poor AV fistula flow.  Patient underwent arterial AV fistula Doppler which showed fistula is patent.  Nephrology was able to use AV fistula successfully during hemodialysis.  Current recommendations to follow-up in outpatient setting.    Interval Problem Update    6 1/121: The patient was seen and evaluated at bedside and appears comfortable.  Patient states that his pain is currently well controlled after undergoing left BKA.  He is tolerating oral intake and denies any chest pains, palpitations, or shortness of breath.  Of note, patient still requiring 3 to 4 L via nasal cannula.  He states he has a history of COPD we are pending respiratory therapist evaluation.  He denies any fever/chills or productive cough.  No other overnight events reported.        Disposition  Pending SNF vs. Physiatry evaluation     Review of Systems  Review of  Systems   Constitutional: Negative for chills, fever and malaise/fatigue.   HENT: Negative for congestion, hearing loss, sore throat and tinnitus.    Eyes: Negative for blurred vision, double vision, photophobia and discharge.   Respiratory: Negative for cough, hemoptysis, sputum production, shortness of breath and wheezing.    Cardiovascular: Negative for chest pain, palpitations, orthopnea, claudication and leg swelling.   Gastrointestinal: Negative for abdominal pain, blood in stool, constipation, diarrhea (loose Stools), heartburn, melena, nausea and vomiting.   Genitourinary: Negative for dysuria, flank pain, hematuria and urgency.   Musculoskeletal: Negative for back pain, joint pain and myalgias.   Skin: Negative for itching and rash.   Neurological: Negative for dizziness, sensory change, speech change, weakness and headaches.   Endo/Heme/Allergies: Does not bruise/bleed easily.   Psychiatric/Behavioral: Negative for depression and suicidal ideas. The patient is not nervous/anxious.         Physical Exam  Temp:  [36.4 °C (97.5 °F)-36.9 °C (98.4 °F)] 36.4 °C (97.5 °F)  Pulse:  [77-90] 90  Resp:  [16-17] 17  BP: ()/(26-50) 108/47  SpO2:  [97 %-100 %] 97 %    Physical Exam  Vitals reviewed.   Constitutional:       General: He is not in acute distress.     Appearance: Normal appearance. He is well-developed. He is obese. He is not diaphoretic.   HENT:      Head: Normocephalic and atraumatic.      Right Ear: External ear normal.      Left Ear: External ear normal.      Nose: No congestion or rhinorrhea.      Mouth/Throat:      Mouth: Mucous membranes are moist.      Pharynx: Oropharynx is clear. No oropharyngeal exudate or posterior oropharyngeal erythema.   Eyes:      General: No scleral icterus.     Extraocular Movements: Extraocular movements intact.      Conjunctiva/sclera: Conjunctivae normal.      Pupils: Pupils are equal, round, and reactive to light.   Cardiovascular:      Rate and Rhythm: Normal  rate and regular rhythm.      Heart sounds: No murmur heard.   No friction rub. No gallop.    Pulmonary:      Breath sounds: No stridor. No wheezing, rhonchi or rales.      Comments: occasional wheezing   Abdominal:      General: Abdomen is flat. Bowel sounds are normal. There is no distension.      Palpations: Abdomen is soft. There is no mass.      Tenderness: There is no abdominal tenderness.   Musculoskeletal:         General: No swelling or tenderness.      Cervical back: Neck supple. No rigidity. No muscular tenderness.      Comments: Right BKA  Left BKA with tender surgical site   Lymphadenopathy:      Cervical: No cervical adenopathy.   Skin:     General: Skin is warm and dry.      Findings: No erythema or rash.   Neurological:      General: No focal deficit present.      Mental Status: He is alert and oriented to person, place, and time. Mental status is at baseline.      Cranial Nerves: No cranial nerve deficit.      Sensory: No sensory deficit.      Motor: No weakness.   Psychiatric:         Mood and Affect: Mood normal.         Behavior: Behavior normal.         Thought Content: Thought content normal.         Fluids    Intake/Output Summary (Last 24 hours) at 6/1/2021 1117  Last data filed at 6/1/2021 1000  Gross per 24 hour   Intake 620 ml   Output 3700 ml   Net -3080 ml       Laboratory  Recent Labs     05/30/21  0538 05/31/21  0537 06/01/21  0500   WBC 9.6 11.0* 11.1*   RBC 2.40* 2.42* 2.42*   HEMOGLOBIN 8.0* 7.9* 8.0*   HEMATOCRIT 24.6* 25.2* 25.0*   .5* 104.1* 103.3*   MCH 33.3* 32.6 33.1*   MCHC 32.5* 31.3* 32.0*   RDW 60.6* 61.6* 61.1*   PLATELETCT 329 383 361   MPV 9.4 9.5 9.1     Recent Labs     05/30/21  0538 05/31/21  0537 06/01/21  0500   SODIUM 135 131* 134*   POTASSIUM 4.3 4.6 4.6   CHLORIDE 96 94* 97   CO2 28 26 27   GLUCOSE 88 104* 104*   BUN 35* 41* 27*   CREATININE 5.83* 6.99* 5.21*   CALCIUM 8.7 8.8 8.7                   Imaging  DX-CHEST-LIMITED (1 VIEW)   Final Result      1.   Cardiomegaly is pulmonary vascular congestion and interstitial edema.   2.  Layering right pleural effusion with overlying atelectasis/consolidation.   3.  Trace pleural fluid versus pleural thickening at the left lung base.   4.  Left lung base calcification is again seen which may related to prior infection or trauma.         US-HEMODIALYSIS GRAFT DUPLEX COMP UPPER EXTREMITY   Final Result      US-EXTREMITY ARTERY LOWER BILAT   Final Result      US-PATSY SINGLE LEVEL BILAT   Final Result      DX-FOOT-2- LEFT   Final Result      1.  Soft tissue swelling of LEFT 4th toe.   2.  No gross bony destruction however osteomyelitis is not excluded by plain film.   3.  Prior partial amputation of great toe.      DX-CHEST-PORTABLE (1 VIEW)   Final Result      Left lower lobe pleural thickening and calcification may be related to sequelae of prior infection or trauma.      Patchy left basilar opacity may represent atelectasis or pneumonitis.      Mild cardiomegaly.      Atherosclerotic plaque.              Assessment/Plan  * Cellulitis of left foot- (present on admission)  Assessment & Plan  One dose of vancomycin given in the ER  On 5/22, foot wound culture grew ESBL  Wound culture growing Klebsiella pneumonia ESBL, but use me diabetes, and Staph aureus   ID with final recommendation is to discontinue IV antibiotics as patient is achieved effective source control with below-knee amputation.  Resolved    S/P BKA (below knee amputation) unilateral, left (HCC)  Assessment & Plan  Status post left BKA on 5/28/2023 1  Scheduled and as needed pain mgmt  Orthopedic surgery following for post BKA care  Labs on AM    DNR (do not resuscitate)- (present on admission)  Assessment & Plan  Discussed with Mr. Sepulveda and he confirmed this status.    Ischemia of toe- (present on admission)  Assessment & Plan  S/P left BKA on 5/28  Resolved    Advance care planning- (present on admission)  Assessment & Plan  In a previous visit, he had  indicated to palliative that he would consider hospice/comfort care  Currently, vascular recommend surgery and patient is agreeable  Consulted palliative care for further goals of care discussion after surgery    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)- (present on admission)  Assessment & Plan  Right BKA and left great toe amputation     Acute respiratory failure with hypoxia ([facility])  Assessment & Plan  Requiring 4 L nasal cannula with a saturation  Secondary to fluid overload from not completing dialysis  Nephrology following, patient for no trial of hemodialysis today    Cardiomyopathy, ischemic- (present on admission)  Assessment & Plan  EF from heart cath 5/10 15-20%    Peripheral vascular disease (HCC)- (present on admission)  Assessment & Plan  Hx of multiple amputations     Hypotension- (present on admission)  Assessment & Plan  Chronic condition  Discontinue coreg  Continue home midodrine  Resolved    Paroxysmal A-fib ([facility])- (present on admission)  Assessment & Plan  Chronic condition  Hold Eliquis in case he needs surgery and utilize SQ heparin for DVT prophylaxis    End stage renal disease on dialysis ([facility])- (present on admission)  Assessment & Plan  Dialysis via left arm fistula T,Th,S  Dr. Hodges, nephrology was consulted from the ER    Type 2 diabetes mellitus with kidney complication, with long-term current use of insulin ([facility])- (present on admission)  Assessment & Plan  With hyperglycemia  Continue sliding scale  Diabetic diet    Macrocytic anemia- (present on admission)  Assessment & Plan  Hemoglobin 6.5 today  No signs of GI bleed  Vit B12/folate unremarkable  Secondary to anemia of chronic kidney disease  Transfuse 1 unit of PRBC  Nephrology following, recommending iron repletion  Continue erythropoietin as per nephrology    CAD (coronary artery disease)- (present on admission)  Assessment & Plan  With hx of stents followed by [facility]  No active chest pain at this  time    Hyponatremia- (present on admission)  Assessment & Plan  Mild, continue to monitor    Pulmonary edema  Assessment & Plan  Noted on 5/30/2021 chest x-ray  Complicated by acute hypoxic respiratory failure requiring 4 L nasal cannula  Likely secondary to fluid overload from difficulties accessing IV fistula/poor flow  Nephrology following, patient for another attempt of hemodialysis today  Labs on a.m.       VTE prophylaxis: Heparin

## 2021-06-01 NOTE — CONSULTS
Physical Medicine and Rehabilitation Consultation              Date of initial consultation: 6/1/2021  Consulting provider: Parish Bray MD  Reason for consultation: assess for acute inpatient rehab appropriateness  LOS: 12 Day(s)    Chief complaint: BL BKA    HPI: The patient is a 75 y.o. left hand dominant male with a past medical history of ESRD on dialysis, PVD status post right BKA, IDDM, recent admission for heart catheterization with ejection fraction 15 to 20% and in-stent stenosis;  who presented on 5/20/2021  6:42 AM with cellulitis of the left fourth toe due to ischemia. He was not a candidate for revascularization, was started on antibiotics and underwent left BKA by Dr. Willi MD on 5/28/2021.     Patient was treated [facility] by Dr. Lata Goodman MD from 1/13 -1/29 2021 for right BKA and ultimately discharged to SNF due to poor safety awareness, low support during the day (family works), and impaired balance.    The patient currently reports some headache, neuropathic pain, phantom limb pain and sensitivity to pain medication causing hallucination and belligerence. Patient states he enjoyed his stay at [facility] after rehab and required 4-6 weeks there before being able to return home. He states that he has a prosthetic and was able to take a few steps on it before representing for this admission. Patient states he has a fistula revision recently due to it stealing blood flow from his hand.     Social Hx:  1 SH  0 LIZ  With: spouse, adult children    THERAPY:  PT: Functional mobility   5/29: Unable to participate in gait, min assist for bed mobility    OT: ADLs  5/29: Max assist lower body dressing and toileting, mod assist upper body dressing    SLP:   5/30: Regular diet thin liquids    IMAGING:  Chest x-ray 5/30/2021  1.  Cardiomegaly is pulmonary vascular congestion and interstitial edema.  2.  Layering right pleural effusion with overlying atelectasis/consolidation.  3.   Trace pleural fluid versus pleural thickening at the left lung base.  4.  Left lung base calcification is again seen which may related to prior infection or trauma.    PROCEDURES:  Left BKA by Dr. Willi MD on 5/28/21.    PMH:  Past Medical History:   Diagnosis Date   • Arrhythmia     hx a fib   • Arthritis 12/29/2020    knees, ankles, back   • CAD (coronary artery disease)     stents   • Chronic anticoagulation 3/26/2020   • Chronic kidney disease (CKD), stage V (HCC)    • Congestive heart failure (HCC)     hx of   • Dental disorder 12/29/2020    partial upper   • Diabetes    • Hemodialysis patient (HCC)     Tuesday, Thursday, Saturday   • Hypertension    • Kidney failure    • Myocardial infarct (HCC)     ? 2019    • Osteoarthritis    • Peripheral neuropathy    • Pneumonia     per hx   • Sleep apnea     does not use cpap anymore       PSH:  Past Surgical History:   Procedure Laterality Date   • KNEE AMPUTATION BELOW Left 5/28/2021    Procedure: AMPUTATION, BELOW KNEE.;  Surgeon: Jarett Márquez M.D.;  Location: [facility];  Service: Orthopedics   • KNEE AMPUTATION BELOW Right 12/31/2020    Procedure: AMPUTATION, BELOW KNEE ...;  Surgeon: Leobardo Lynn M.D.;  Location: [facility];  Service: Orthopedics   • TOE AMPUTATION Right 11/16/2020    Procedure: AMPUTATION, TOE GREAT;  Surgeon: Leobardo Lynn M.D.;  Location: [facility];  Service: Orthopedics   • TOE AMPUTATION Left 5/17/2020    Procedure: AMPUTATION, TOE GREAT;  Surgeon: Leobardo Lynn M.D.;  Location: [facility];  Service: Orthopedics   • TENOTOMY Left 5/17/2020    Procedure: FLEXOR TENOTOMIES, TWO-FIVE TOES;  Surgeon: Leobardo Lynn M.D.;  Location: [facility];  Service: Orthopedics   • APPENDECTOMY     • OTHER CARDIAC SURGERY      stents x1   • OTHER ORTHOPEDIC SURGERY      knee surgery       FHX:  Family History   Problem Relation Age of Onset   • Heart Disease Mother    •  "Alcohol/Drug Father    • Thyroid Son    • Diabetes Brother    • Hypertension Neg Hx    • Hyperlipidemia Neg Hx        Medications:  Current Facility-Administered Medications   Medication Dose   • Respiratory Therapy Consult     • guaiFENesin ER (MUCINEX) tablet 600 mg  600 mg   • gabapentin (NEURONTIN) capsule 300 mg  300 mg   • oxyCODONE immediate-release (ROXICODONE) tablet 5 mg  5 mg   • traMADol (ULTRAM) 50 MG tablet 50 mg  50 mg   • acetaminophen (TYLENOL) tablet 1,000 mg  1,000 mg   • clopidogrel (PLAVIX) tablet 75 mg  75 mg   • heparin injection 5,000 Units  5,000 Units   • sevelamer carbonate (RENVELA) tablet 2,400 mg  2,400 mg   • atorvastatin (LIPITOR) tablet 40 mg  40 mg   • calcitRIOL (ROCALTROL) capsule 0.25 mcg  0.25 mcg   • methimazole (TAPAZOLE) tablet 5 mg  5 mg   • midodrine (PROAMATINE) tablet 10 mg  10 mg   • tamsulosin (FLOMAX) capsule 0.8 mg  0.8 mg   • senna-docusate (PERICOLACE or SENOKOT S) 8.6-50 MG per tablet 2 tablet  2 tablet    And   • polyethylene glycol/lytes (MIRALAX) PACKET 1 Packet  1 Packet    And   • bisacodyl (DULCOLAX) suppository 10 mg  10 mg   • ondansetron (ZOFRAN) syringe/vial injection 4 mg  4 mg   • ondansetron (ZOFRAN ODT) dispertab 4 mg  4 mg   • insulin regular (HumuLIN R,NovoLIN R) injection  2-9 Units    And   • glucose 4 g chewable tablet 16 g  16 g    And   • dextrose 50% (D50W) injection 50 mL  50 mL   • epoetin (Retacrit) injection (Dialysis Use Only) 4,000 Units  4,000 Units   • heparin injection 1,500 Units  1,500 Units       Allergies:  Allergies   Allergen Reactions   • Mircera [Methoxy Polyethylene Glycol-Epoetin Beta] Hives   • Other Drug      \"Opiods\" caused  hallucinations       Physical Exam:  Vitals: /39   Pulse 95   Temp 37.3 °C (99.1 °F) (Temporal)   Resp 16   Ht 1.676 m (5' 6\")   Wt 92.5 kg (203 lb 14.8 oz)   SpO2 97%   Gen: NAD  Head: NC/AT  Eyes/ Nose/ Mouth: PERRLA, moist mucous membranes  Cardio: RRR, good distal perfusion, warm " extremities  Pulm: normal respiratory effort, no cyanosis   Abd: Soft NTND, negative borborygmi   Ext: 1+ edema RUE. LUE is cold and weak    Mental status: answers questions appropriately follows commands  Speech: fluent, no aphasia or dysarthria    Motor:      Upper Extremity  Myotome R L   Shoulder flexion C5 5 3/5   Elbow flexion C5 5 3/5   Wrist extension C6 5 4/5   Elbow extension C7 5 4/5   Finger flexion C8 5 4/5   Finger abduction T1 5 3/5     Lower Extremity Myotome R L   Hip flexion L2 4/5 2/5   Knee extension L3 4/5 3/5   Ankle dorsiflexion L4     Toe extension L5     Ankle plantarflexion S1       Sensory:   intact to light touch through out    DTRs:  Right  Left    Brachioradialis  2+  2+   Patella tendon  2+ 2+     Labs: Reviewed and significant for   Recent Labs     05/30/21 0538 05/31/21  0537 06/01/21  0500   RBC 2.40* 2.42* 2.42*   HEMOGLOBIN 8.0* 7.9* 8.0*   HEMATOCRIT 24.6* 25.2* 25.0*   PLATELETCT 329 383 361     Recent Labs     05/30/21 0538 05/31/21 0537 06/01/21  0500   SODIUM 135 131* 134*   POTASSIUM 4.3 4.6 4.6   CHLORIDE 96 94* 97   CO2 28 26 27   GLUCOSE 88 104* 104*   BUN 35* 41* 27*   CREATININE 5.83* 6.99* 5.21*   CALCIUM 8.7 8.8 8.7     Recent Results (from the past 24 hour(s))   POCT glucose device results    Collection Time: 05/31/21  1:05 PM   Result Value Ref Range    Glucose - Accu-Ck 126 (H) 65 - 99 mg/dL   POCT glucose device results    Collection Time: 05/31/21  5:38 PM   Result Value Ref Range    Glucose - Accu-Ck 130 (H) 65 - 99 mg/dL   POCT glucose device results    Collection Time: 05/31/21  8:53 PM   Result Value Ref Range    Glucose - Accu-Ck 114 (H) 65 - 99 mg/dL   Renal Function Panel    Collection Time: 06/01/21  5:00 AM   Result Value Ref Range    Sodium 134 (L) 135 - 145 mmol/L    Potassium 4.6 3.6 - 5.5 mmol/L    Chloride 97 96 - 112 mmol/L    Co2 27 20 - 33 mmol/L    Glucose 104 (H) 65 - 99 mg/dL    Creatinine 5.21 (HH) 0.50 - 1.40 mg/dL    Bun 27 (H) 8 - 22  mg/dL    Calcium 8.7 8.5 - 10.5 mg/dL    Phosphorus 3.5 2.5 - 4.5 mg/dL    Albumin 2.7 (L) 3.2 - 4.9 g/dL   CBC WITH DIFFERENTIAL    Collection Time: 06/01/21  5:00 AM   Result Value Ref Range    WBC 11.1 (H) 4.8 - 10.8 K/uL    RBC 2.42 (L) 4.70 - 6.10 M/uL    Hemoglobin 8.0 (L) 14.0 - 18.0 g/dL    Hematocrit 25.0 (L) 42.0 - 52.0 %    .3 (H) 81.4 - 97.8 fL    MCH 33.1 (H) 27.0 - 33.0 pg    MCHC 32.0 (L) 33.7 - 35.3 g/dL    RDW 61.1 (H) 35.9 - 50.0 fL    Platelet Count 361 164 - 446 K/uL    MPV 9.1 9.0 - 12.9 fL    Neutrophils-Polys 77.90 (H) 44.00 - 72.00 %    Lymphocytes 12.40 (L) 22.00 - 41.00 %    Monocytes 7.50 0.00 - 13.40 %    Eosinophils 0.00 0.00 - 6.90 %    Basophils 0.50 0.00 - 1.80 %    Immature Granulocytes 1.70 (H) 0.00 - 0.90 %    Nucleated RBC 0.00 /100 WBC    Neutrophils (Absolute) 8.65 (H) 1.82 - 7.42 K/uL    Lymphs (Absolute) 1.38 1.00 - 4.80 K/uL    Monos (Absolute) 0.83 0.00 - 0.85 K/uL    Eos (Absolute) 0.00 0.00 - 0.51 K/uL    Baso (Absolute) 0.06 0.00 - 0.12 K/uL    Immature Granulocytes (abs) 0.19 (H) 0.00 - 0.11 K/uL    NRBC (Absolute) 0.00 K/uL   ESTIMATED GFR    Collection Time: 06/01/21  5:00 AM   Result Value Ref Range    GFR If  13 (A) >60 mL/min/1.73 m 2    GFR If Non African American 11 (A) >60 mL/min/1.73 m 2   POCT glucose device results    Collection Time: 06/01/21  9:06 AM   Result Value Ref Range    Glucose - Accu-Ck 89 65 - 99 mg/dL         ASSESSMENT:  Patient is a 75 y.o. male admitted with LLE cellulitis and h/o of R-BKA  now s/p L-BKA.    [facility] Code / Diagnosis to Support: 0005.7 - Amputation: Bilateral Lower Limb Below the Knee (BK/BK)    Rehabilitation: Impaired ADLs and mobility  Patient is not a candidate for inpatient rehab because he is not expected to have a reasonable amount of improvement in a reasonable amount of time using the combined approach.     Additional Recommendations:  - recommend SNF placement, patient preference is [facility]. He  required a prolonged SNF stay after IPR on his last admission and this time re-presents with bilateral BKA and worsened functional status due to cardiac and renal dysfunction. He states he feels weak and would have a hard time keeping up at [facility]. Therefore, recommend SNF placement until patient feels strong enough to return home at a WC level and continue with outpatient therapies for gait training.   - Stump  to LLE   - Knee immobilizers bilaterally to prevent flexion contractures   - Tylenol 1g for headaches PRN   - Changing gabapentin to 100mg TID - high risk medication reduced dosing for depressed renal function with GFR 11 and CrCl 12.6. See below for additional dosing guidelines.     Guidelines for Gabapentin dosing based on GFR   GFR >60 ml/min: Give usual dosage  [30-59]: 400-1400mg/day  [15-29]: Dosage range: 200-700mg/day.  [<15]: 100-300 mg/day      Medical Complexity:  BL BKA  Anemia   ESRD   Neuropathic pain     DVT PPX: Heparin 5K TID      Thank you for allowing us to participate in the care of this patient.       Guillermo Hernandez, DO   Physical Medicine and Rehabilitation

## 2021-06-01 NOTE — WOUND TEAM
[facility]  Inpatient Services  Wound and Skin Care     Admission Date: 2021     Last order of IP CONSULT TO WOUND CARE was found on 2021 from Hospital Encounter on 2021     HPI, PMH, SH: Reviewed    Past Surgical History:   Procedure Laterality Date   • KNEE AMPUTATION BELOW Left 2021    Procedure: AMPUTATION, BELOW KNEE.;  Surgeon: Jarett Márquez M.D.;  Location: SURGERY [facility];  Service: Orthopedics   • KNEE AMPUTATION BELOW Right 2020    Procedure: AMPUTATION, BELOW KNEE ...;  Surgeon: Leobardo Lynn M.D.;  Location: SURGERY [facility];  Service: Orthopedics   • TOE AMPUTATION Right 2020    Procedure: AMPUTATION, TOE GREAT;  Surgeon: Leobardo Lynn M.D.;  Location: SURGERY [facility];  Service: Orthopedics   • TOE AMPUTATION Left 2020    Procedure: AMPUTATION, TOE GREAT;  Surgeon: Leobardo Lynn M.D.;  Location: [facility];  Service: Orthopedics   • TENOTOMY Left 2020    Procedure: FLEXOR TENOTOMIES, TWO-FIVE TOES;  Surgeon: Leobardo Lynn M.D.;  Location: SURGERY [facility];  Service: Orthopedics   • APPENDECTOMY     • OTHER CARDIAC SURGERY      stents x1   • OTHER ORTHOPEDIC SURGERY      knee surgery     Social History     Tobacco Use   • Smoking status: Former Smoker     Packs/day: 1.00     Years: 55.00     Pack years: 55.00     Types: Cigarettes     Quit date: 2014     Years since quittin.3   • Smokeless tobacco: Never Used   Substance Use Topics   • Alcohol use: Not Currently     Chief Complaint   Patient presents with   • Weakness     x2 days   • Dizziness   • Hypotension     per dialysis clinic     Diagnosis: Cellulitis of left foot [L03.116]    Unit where seen by Wound Team: T302/     WOUND CONSULT/FOLLOW UP RELATED TO:  sacrum    WOUND HISTORY:  Pt presented to ED with worsenng left foot discoloration and pain.  Pt has a R BKA.   Dr Encinas follows pt and determined pt is not a candidate for  revascularization.  Pt states that just prior to this admission he was spending most of the time in bed at home.  Transfers where a 'big production'    21 L BKA  with Dr Márquez   WOUND ASSESSMENT/LDA       Wound 21 Pressure Injury Sacrum Bilateral DTI 21 (Active)   Wound Image    21 1300   Site Assessment Purple;Red;Buckner 21 1300   Periwound Assessment Purple;Buckner 21 1300   Margins Defined edges;Attached edges 21 1300   Closure Secondary intention 21 1300   Drainage Amount None 21 1300   Treatments Cleansed;Site care;Offloading 21 1300   Wound Cleansing Foam Cleanser/Washcloth 21 1300   Periwound Protectant Barrier Paste 21 1300   Dressing Cleansing/Solutions Not Applicable 21 1300   Dressing Options Mepilex 21 1300   Dressing Changed Changed 21 1300   Dressing Status Clean;Dry;Intact 21 1300   Dressing Change/Treatment Frequency Every 72 hrs, and As Needed 21 1300   NEXT Dressing Change/Treatment Date 21 1300   NEXT Weekly Photo (Inpatient Only) 21 1300   Pressure Injury Stage DTPI 21 1700                  Shape irregular 21 1300   Wound Odor None 21 1300   Pulses N/A 21 1300   Exposed Structures None 21 1300   WOUND NURSE ONLY - Time Spent with Patient (mins) 60 21 1300         Vascular:    PATSY:   PATSY Results, Last 30 Days US-PATSY SINGLE LEVEL BILAT    Result Date: 2021  Narrative  Vascular Laboratory  Conclusions  No flow by PPG 2-5th toes.  severely depressed PATSY dorsalis pedis, normal PATSY posterior tibial on left  side.  KLARISSA BRADSHAW  Age:    75    Gender:     M  MRN:    [id number]  :    1945      BSA:  Exam Date:     2021 10:13  Room #:     Inpatient  Priority:     Routine  Ht (in):             Wt (lb):  Ordering Physician:        GINGER FRAZIER  Referring Physician:       [id number] Miguel                              GINGER  Sonographer:               Asha Bowens  Study Type:                Limited Bilateral  Technical Quality:         Fair  Indications:     Type 2 diabetes mellitus with foot ulcer  CPT Codes:       05437  ICD Codes:       E11.621  History:         Right below the knee amputation, left big toe amputated, left                   fourth toe ischemia. Prior exam 4/21/20  Limitations:     Right below the knee amputation, left big toe amputated,                   unable to obtain left brachial pressure due to AVF, bandage                   over the right common femoral.                 RIGHT  Waveform            Systolic BPs (mmHg)                             120           Brachial                                           Common Femoral                                           Posterior Tibial                                           Dorsalis Pedis                                           Peroneal                                           PATSY                                           TBI                       LEFT  Waveform        Systolic BPs (mmHg)                                           Brachial  Triphasic                                Common Femoral  Monophasic                 127           Posterior Tibial  Monophasic                 45            Dorsalis Pedis                                           Peroneal                             1.06          PATSY                                           TBI  Findings  Right-  Ankle pressure not obtained due to below the knee amputation.  Bandage over the common femoral artery.  Doppler waveform of the popliteal artery is of high amplitude and  triphasic.  Left-  Ankle-brachial index of the dorsalis pedis artery is severely reduced.  Ankle-brachial index of the posterior tibial artery is normal.  Doppler waveform of the common femoral artery is of high amplitude and  triphasic.  Doppler waveforms at the ankle are monophasic with  moderate amplitude.  PPG's-  No flow  Bilateral arterial duplex scan was performed in accordance with lower  extremity arterial evaluation protocol - see separate report.  Mark Rodriguez MD  (Electronically Signed)  Final Date:      21 May 2021 15:25    PATSY Results, Last 30 Days US-EXTREMITY ARTERY LOWER BILAT    Result Date: 2021  Narrative Lower Extremity  Arterial Duplex Report  Vascular Laboratory  CONCLUSIONS  Left below knee diffuse disease, absent flow in peroneal artery.  KLARISSA BRADSHAW  Exam Date:     2021 12:09  Room #:     Inpatient  Priority:     Routine  Ht (in):             Wt (lb):  Ordering Physician:        GINGER FRAZIER  Referring Physician:       [id number] Miguel  Sonographer:               Asha Bowens  Study Type:                Limited Bilateral  Technical Quality:         Adequate  Age:    75    Gender:     M  MRN:    [id number]  :    1945      BSA:  Indications:     Type 2 diabetes mellitus with foot ulcer  CPT Codes:       26507  ICD Codes:       E11.621  History:         Right below the knee amputation, left big toe amputated,                   fourth toe ischemia. Prior exam 20.  Limitations:     Rigth below the knee amputation.                RIGHT  Waveform        Peak Systolic Velocity (cm/s)                  Prox    Prox-Mid  Mid    Mid-Dist  Distal  Triphasic                         52                       CFA  Biphasic        55                                         PFA  Biphasic        44                65      24       56      SFA  Biphasic                          35                       POP  Monophasic      62                                         AT  Monophasic      51                                         PT  Not                                                        LISA  attained                LEFT  Waveform        Peak Systolic Velocity (cm/s)                  Prox    Prox-Mid  Mid    Mid-Dist   Distal  Triphasic                         132                      CFA  Biphasic        43                                         PFA  Biphasic        50                62               42      SFA  Triphasic                         56                       POP  Monophasic      48                                 70      AT  Monophasic      37                                 30      PT  Absent          0                                  54      LISA  FINDINGS  Right-  Calcific atherosclerotic plaque seen throughout the lower extremity.  Stenosis of the distal superficial femoral artery. Velocities are  consistent with 50-75% stenosis.  Proximal anterior and posterior tibial artery waveforms are monophasic.  Proximal peroneal artery was not visualized.  Left-  Calcific atherosclerotic plaque seen throughout the lower extremity.  Monophasic waveforms seen in the anterior and posterior tibial arteries.  Absent flow seen in the proximal peroneal artery.  Mark Rodriguez MD  (Electronically Signed)  Final Date:      21 May 2021 14:42      Lab Values:    Lab Results   Component Value Date/Time    WBC 11.1 (H) 06/01/2021 05:00 AM    RBC 2.42 (L) 06/01/2021 05:00 AM    HEMOGLOBIN 8.0 (L) 06/01/2021 05:00 AM    HEMATOCRIT 25.0 (L) 06/01/2021 05:00 AM    CREACTPROT 24.11 (H) 11/13/2020 10:28 PM    SEDRATEWES >120 (H) 11/13/2020 10:28 PM    HBA1C 5.7 (H) 05/11/2021 06:30 AM        Culture Results show:  Recent Results (from the past 720 hour(s))   CULTURE WOUND W/ GRAM STAIN    Collection Time: 05/20/21  7:59 AM    Specimen: Left Foot; Wound   Result Value Ref Range    Significant Indicator POS (POS)     Source WND     Site LEFT FOOT     Culture Result Light growth usual skin xander. (A)     Gram Stain Result       Rare WBCs.  Many Gram positive cocci.  Few Gram negative rods.      Culture Result (A)      Klebsiella pneumoniae ESBL  Moderate growth  Extended Spectrum Beta-lactamase (ESBL) isolated.  ESBL's may be clinically  resistant to therapy with  Penicillins,Cephalosporins or Aztreonam despite  apparent in vitro susceptibility to some of these agents.  The patient requires contact isolation.  Please contact pharmacy or an Infectious Disease Specialist  if you have any questions about appropriate therapy.      Culture Result Proteus mirabilis  Light growth   (A)     Culture Result (A)      Staphylococcus aureus  Moderate growth  This isolate is presumed to be clindamycin resistant based on  detection of inducible resistance.  Clindamycin may still  be effective in some patients.         Susceptibility    Proteus mirabilis - BINU     Ceftriaxone <=1 Sensitive mcg/mL     Cefazolin <=2 Sensitive mcg/mL     Ciprofloxacin <=0.25 Sensitive mcg/mL     Cefepime <=2 Sensitive mcg/mL     Cefuroxime <=4 Sensitive mcg/mL     Ertapenem <=0.5 Sensitive mcg/mL     Gentamicin <=2 Sensitive mcg/mL     Moxifloxacin <=2 Sensitive mcg/mL     Pip/Tazobactam <=8 Sensitive mcg/mL     Trimeth/Sulfa <=0.5/9.5 Sensitive mcg/mL     Tobramycin <=2 Sensitive mcg/mL     Ampicillin <=8 Sensitive mcg/mL    Staphylococcus aureus - BINU     Cefazolin <=8 Sensitive mcg/mL     Cefepime <=4 Sensitive mcg/mL     Ampicillin/sulbactam <=8/4 Sensitive mcg/mL     Pip/Tazobactam <=8 Sensitive mcg/mL     Vancomycin 1 Sensitive mcg/mL     Trimeth/Sulfa <=0.5/9.5 Sensitive mcg/mL     Tetracycline <=4 Sensitive mcg/mL     Azithromycin >4 Resistant mcg/mL     Clindamycin <=0.25 Resistant mcg/mL     Ceftaroline <=0.5 Sensitive mcg/mL     Daptomycin <=0.5 Sensitive mcg/mL     Erythromycin >4 Resistant mcg/mL     Oxacillin 0.5 Sensitive mcg/mL    Klebsiella pneumoniae esbl - BINU     Ceftriaxone >32 Extended Spectrum Beta Lactamase mcg/mL     Cefazolin >16 Resistant mcg/mL     Ciprofloxacin >2 Resistant mcg/mL     Cefepime >16 Resistant mcg/mL     Cefuroxime >16 Resistant mcg/mL     Ertapenem <=0.5 Sensitive mcg/mL     Gentamicin <=2 Sensitive mcg/mL     Moxifloxacin 4 Intermediate  mcg/mL     Ampicillin/sulbactam 16/8 Intermediate mcg/mL     Pip/Tazobactam <=8 Sensitive mcg/mL     Trimeth/Sulfa >2/38 Resistant mcg/mL     Tigecycline <=2 Sensitive mcg/mL     Tobramycin <=2 Sensitive mcg/mL       Pain Level/Medicated:  No report of pain at sacrum       INTERVENTIONS BY WOUND TEAM:   Performed standard wound care which includes appropriate positioning, dressing removal and non-selective debridement. Pictures and measurements obtained weekly if/when required.    Cleansed with:  damp cloth  Sharp debridement: na  Genesis wound: Cleansed with damp cloth  Primary Dressing: mepilex  Secondary (Outer) Dressing:   TAPS, GELA, avoid supine - sign posted at [facility]    Interdisciplinary consultation: Patient, Bedside RN My, manager Divya MONTOYA, Bobbi Benedict RN, spoke to wife about todays presentation and its implications and that it is expected that this wound may evolve into an open wound - wife was understanding.      EVALUATION / RATIONALE FOR TREATMENT:  Most Recent Date:  6/1/21: Patient's sacrum  Appears to be less purple. Continue to offload and continue to keep clean.    3/29/21 sacral discoloration has delineated and is a DTI.  Off loading measures are in place  5/27/21 pt with discoloration around sacrum.  Pt's mobility is compromised although he was able to move in bed with minimal assistance.  Pt appears to have dusky discoloration several areas of the body including L foot, B hands.  Pt has known cardiac disease, PAD, and sleep apnea in addition to DM and HTN all affecting his tissue perfusion.       Goals: Steady decrease in wound area and depth weekly.    WOUND TEAM PLAN OF CARE ([X] for frequency of wound follow up,):   Nursing to follow orders written for wound care. Contact wound team if area fails to progress, deteriorates or with any questions/concerns  Dressing changes by wound team:                   Follow up 3 times weekly:                NPWT change 3 times weekly:     Follow up 1-2  times weekly:    X - nursing to do dialy dressing and checks, wound will follow up on wound progress 1x/week  Follow up Bi-Monthly:                   Follow up as needed:    Other (explain):     NURSING PLAN OF CARE ORDERS (X):  Dressing changes: See Dressing Care orders: X  Skin care: See Skin Care orders: x  RN Prevention Protocol: X  Rectal tube care: See Rectal Tube Care orders:   Other orders:    RSKIN:   CURRENTLY IN PLACE (X), APPLIED THIS VISIT (A), ORDERED (O):   Q shift Kwame:  X  Q shift pressure point assessments:  X    Surface/Positioning   Pressure redistribution mattress            Low Airloss     X     Bariatric foam      Bariatric GELA     Waffle cushion      X  Waffle Overlay          Reposition q 2 hours   X   TAPs Turning system   X  Z Shad Pillow     Offloading/Redistribution   Sacral Mepilex (Silicone dressing)  X  Heel Mepilex (Silicone dressing)         Heel float boots (Prevalon boot)             Float Heels off Bed with Pillows           Respiratory   Silicone O2 tubing         Gray Foam Ear protectors   X  Cannula fixation Device (Tender )          High flow offloading Clip    Elastic head band offloading device      Anchorfast                                                         Trach with Optifoam split foam             Containment/Moisture Prevention    Rectal tube or BMS    Purwick/Condom Cath        Tomas Catheter    Barrier wipes           Barrier paste       Antifungal tx      Interdry        Mobilization       Up to chair        Ambulate      PT/OT  X    Nutrition       Dietician   On board - saw pt 5/27/21    Diabetes Education      PO     TF     TPN     NPO   # days     Other        Anticipated discharge plans:   LTACH:        SNF/Rehab:     X pt will need ongoing care to [facility]             Home Health Care:           Outpatient Wound Center:            Self/Family Care:        Other:

## 2021-06-01 NOTE — THERAPY
Occupational Therapy  Daily Treatment     Patient Name: Luis Sepulveda  Age:  75 y.o., Sex:  male  Medical Record #: [id number]  Today's Date: 6/1/2021     Precautions: (P) Fall Risk  Comments: (P) New L BKA, old R BKA    Assessment    Pt progressing well with therapy today. Reinforced figure 8 techniques for wrapping L LE to promote shaping and prosthetic fit. Pt demonstrated good teach back of techniques for donning R LE prosthetic but continues to require assist at this time d/t limited  strength with L hand. Pt able to complete 1x sit<>stand EOB with 2pa. Pt primarily limited today with functional mobility d/t high levels of pain but motivated to work hard with therapy throughout session.     Plan    Continue current treatment plan.    DC Equipment Recommendations: (P) Unable to determine at this time  Discharge Recommendations: (P) Recommend post-acute placement for additional occupational therapy services prior to discharge home     06/01/21 1508   Precautions   Precautions Fall Risk   Comments New L BKA, old R BKA   Pain   Pain Scales 0 to 10 Scale    Pain 0 - 10 Group   Location Leg   Location Orientation Left   Pain Rating Scale (NPRS) 6   Therapist Pain Assessment Post Activity Pain Same as Prior to Activity;Nurse Notified;0   Other Treatments   Other Treatments Provided reinforced figure 8 wrapping techniques and densensitization techniques   Balance   Sitting Balance (Static) Fair +   Sitting Balance (Dynamic) Fair   Standing Balance (Static) Poor +   Standing Balance (Dynamic)   (NT)   Weight Shift Sitting Fair   Skilled Intervention Verbal Cuing;Tactile Cuing;Postural Facilitation;Compensatory Strategies   Comments with FWW   Bed Mobility    Supine to Sit Supervised   Sit to Supine Supervised   Scooting Minimal Assist   Rolling Minimal Assist to Rt.;Minimum Assist to Lt.   Comments good rolling techniques for repositioning   Activities of Daily Living   Lower Body Dressing Moderate Assist    Comments mod A for LB prosthesis; reinforced techniques for donning prosthetic (difficulty d/t limited use of LUE with limited  strength)   How much help from another person does the patient currently need...   Putting on and taking off regular lower body clothing? 2   Bathing (including washing, rinsing, and drying)? 2   Toileting, which includes using a toilet, bedpan, or urinal? 2   Putting on and taking off regular upper body clothing? 3   Taking care of personal grooming such as brushing teeth? 3   Eating meals? 4   6 Clicks Daily Activity Score 16   Functional Mobility   Sit to Stand Moderate Assist  (2pa)   Transfer Method Stand Step   Mobility bed mob, EOB STS, lateral/ant/post scoots   Activity Tolerance   Sitting Edge of Bed 20 min   Standing 30 sec   Comments limited standing tolerance secondary to pain   Patient / Family Goals   Patient / Family Goal #1 get stronger/ go home   Goal #1 Outcome Progressing as expected   Short Term Goals   Short Term Goal # 1 SBA slide board transfer bed to commode/ wheelchair  (able to tolerate standing (new goal may be relevant))   Goal Outcome # 1 Progressing as expected   Short Term Goal # 2 seated UB ADL with set up   Goal Outcome # 2 Progressing as expected   Short Term Goal # 3 tolerate 15 minutes of continuous activity, prior to resting, wihtout HORTON   Goal Outcome # 3 Progressing as expected   Short Term Goal # 4 tolerate STS using right prosthetic, wiht SBA   Goal Outcome # 4 Progressing as expected   Education Group   Education Provided Activities of Daily Living   Role of Occupational Therapist Patient Response Patient;Acceptance;Explanation;Demonstration;Verbal Demonstration;Reinforcement Needed   ADL Patient Response Patient;Acceptance;Explanation;Action Demonstration   Anticipated Discharge Equipment and Recommendations   DC Equipment Recommendations Unable to determine at this time   Discharge Recommendations Recommend post-acute placement for  additional occupational therapy services prior to discharge home   Interdisciplinary Plan of Care Collaboration   IDT Collaboration with  Nursing;Physical Therapist Assistant (PTA)   Patient Position at End of Therapy In Bed;Bed Alarm On;Phone within Reach;Tray Table within Reach;Call Light within Reach   Collaboration Comments OT tx and pt disposition   Session Information   Date / Session Number  6/1 2 (2/3, 6/4)   Priority 3

## 2021-06-01 NOTE — DISCHARGE PLANNING
Acute Rehab Hospital/ Transitional Care Coordination  PMR consulted.    Recommendations made for SNF  Updated Justyna/ CM    Thank you for referral.

## 2021-06-01 NOTE — DISCHARGE PLANNING
Received Choice form at [phone]  Agency/Facility Name: [facility]  Referral sent per Choice form @ [phone]

## 2021-06-01 NOTE — CARE PLAN
The patient is Watcher - Medium risk of patient condition declining or worsening    Shift Goals  Clinical Goals: Pain management, ween off O2  Patient Goals: Pain management    Progress made toward(s) clinical / shift goals:  Pt's O2 sats remain in high 90's even while sleeping, starting to ween off of supplemental O2. Pt reports that pain is controlled with PRN meds.    Patient is not progressing towards the following goals:n/a    Problem: Pain - Standard  Goal: Alleviation of pain or a reduction in pain to the patient’s comfort goal  Outcome: Progressing     Problem: Skin Integrity  Goal: Skin integrity is maintained or improved  Outcome: Progressing     Problem: Discharge Planning - Diabetes  Goal: Patient's continuum of care needs will be met  Outcome: Progressing     Problem: Infection - Diabetes  Goal: Patient will remain free from signs and symptoms of infection  Outcome: Progressing     Problem: Respiratory  Goal: Patient will achieve/maintain optimum respiratory ventilation and gas exchange  Outcome: Progressing

## 2021-06-01 NOTE — CARE PLAN
Problem: Pain - Standard  Goal: Alleviation of pain or a reduction in pain to the patient’s comfort goal  Outcome: Progressing   The patient is Watcher - Medium risk of patient condition declining or worsening    Shift Goals  Clinical Goals: breathing treatments, pain management, mobility  Patient Goals: comfort, pain management    Progress made toward(s) clinical / shift goals:  PRN pain meds in use.    Patient is not progressing towards the following goals:

## 2021-06-01 NOTE — THERAPY
"Physical Therapy   Daily Treatment     Patient Name: Luis Sepulveda  Age:  75 y.o., Sex:  male  Medical Record #: [id number]  Today's Date: 6/1/2021     Precautions: Fall Risk    Assessment    Pt presenting highly motivated to participate. Pt educated on importance of L BKA positioning and exercises as pt stating \"I know but I just haven't been feeling it.\" Pt was able to perform bed mobility w/out assist using the bed rails. He needed ModA to don R LE prosthetic d/t weakness in L hand. Pt stating that he only had the prosthetic 2 days before he had to come to the hospital. He needed MaxA to stand w/ the FWW and could tolerate static standing but unable to progress to dynamic. Pt also educated on importance of wrapping and was provided a HO.    Plan    Continue current treatment plan.    DC Equipment Recommendations: Unable to determine at this time  Discharge Recommendations: Recommend post-acute placement for additional physical therapy services prior to discharge home      Subjective    \"I just wish I could reach in and get this gunk out of my throat.\"     Objective       06/01/21 1404   Precautions   Precautions Fall Risk   Comments New L BKA, old R BKA w/ prosthetic   Gait Analysis   Gait Level Of Assist Unable to Participate   Bed Mobility    Supine to Sit Supervised   Sit to Supine Supervised   Scooting Supervised   Rolling Supervised   Comments Use of bed rails. Extra time to perform.   Functional Mobility   Sit to Stand Maximal Assist   Bed, Chair, Wheelchair Transfer Unable to Participate   Mobility STS x 1 w/ FWW. Pt needing a lot of assist for lift off.   Short Term Goals    Short Term Goal # 1 Pt will perform bed mob at spv in 6tx to improve functional independence.    Goal Outcome # 1 Goal met   Short Term Goal # 2 Pt will perform SB transfer at min A in 6tx to imrpove functional mobility.    Goal Outcome # 2 Goal not met   Short Term Goal # 3 Pt will perform transfer with R LE prosthesis and FWW " at min A in 6tx to improve functional mobility.    Goal Outcome # 3 Goal not met

## 2021-06-01 NOTE — DISCHARGE PLANNING
Anticipated Discharge Disposition: Acute rehab vs SNF    Action: Discussed in IDT rounds. Per CM notes, pt & family requesting rehab consult. Per  MD to enter orders and await determination.     Barriers to Discharge: PMR consult pending    Plan: Care coordination will follow and assist with discharge to rehab vs SNF.       Addendum:    Notified PMR recommendation is for SNF. CM spoke with pt; pt agreeable with sending referral to [facility] (SNF). Choice form sent to [facility].

## 2021-06-02 PROBLEM — I50.9 CHF (CONGESTIVE HEART FAILURE) (HCC): Status: ACTIVE | Noted: 2021-01-01

## 2021-06-02 NOTE — DISCHARGE PLANNING
@6030  Agency/Facility Name: [facility]   Outcome: Voice mail left regarding referral. Requested a call back.    Agency/Facility Name: [facility]   Spoke To: Alanna   Outcome: Per Alanna referral has not been reviewed. Alanna will call back with updated information.

## 2021-06-02 NOTE — PROGRESS NOTES
[facility] Medicine Daily Progress Note    Date of Service  6/2/2021    Chief Complaint  75 y.o. male admitted 5/20/2021 with weakness and dizziness    Hospital Course  75-year-old male with a past medical history of end-stage renal disease on dialysis, peripheral vascular disease status post right below the knee amputation, insulin-dependent diabetes mellitus, there was most recently admitted here from 5/10/2021 through 5/18/2021 when he underwent heart catheterization revealing ejection fraction of 15 to 20% with 80% ostial LAD in-stent restenosis and distal RCA disease was admitted on 5/20/2021 for cellulitis of the left fourth toe foot secondary to ischemic.    Patient was evaluated by vascular surgery which determined that patient was not a candidate for revascularization procedures.  Infectious disease following for which she was started on a short course of meropenem.  He underwent left BKA on 5/28/2021 which she tolerated well.  IV antibiotics discontinued by infectious disease shortly after.  Throughout hospital course, nephrology having difficulty with dialysis secondary to poor AV fistula flow.  Patient underwent arterial AV fistula Doppler which showed fistula is patent.  Nephrology was able to use AV fistula successfully during hemodialysis.  Current recommendations to follow-up in outpatient setting.    Interval Problem Update    6/1/21: The patient was seen and evaluated at bedside and appears comfortable.  Patient states that his pain is currently well controlled after undergoing left BKA.  He is tolerating oral intake and denies any chest pains, palpitations, or shortness of breath.  Of note, patient still requiring 3 to 4 L via nasal cannula.  He states he has a history of COPD we are pending respiratory therapist evaluation.  He denies any fever/chills or productive cough.  No other overnight events reported.      6/2/2021: The patient seen and evaluated at bedside and appears comfortable however he  still has persistent supplemental oxygen needs.  Repeat chest x-ray shows worsening right-sided pleural effusion as well as interstitial edema.  This is likely multifactorial in the setting of noncompliance with hemodialysis as well as history of congestive heart failure reduced ejection fraction.  He will continue with dialysis sessions and we will begin IV diuresis as tolerated.  Pending procalcitonin to rule out infectious cause.  At this time patient denies any productive cough, no chest pains, palpitations, or nausea/vomiting.  No other overnight events reported by nursing staff.      Disposition  Pending [facility]     Review of Systems  Review of Systems   Constitutional: Negative for chills and fever.   HENT: Negative for congestion, hearing loss, sore throat and tinnitus.    Eyes: Negative for blurred vision, double vision, photophobia and discharge.   Respiratory: Positive for shortness of breath and wheezing. Negative for cough.    Cardiovascular: Negative for chest pain, palpitations and orthopnea.   Gastrointestinal: Negative for abdominal pain, constipation, diarrhea (loose Stools), nausea and vomiting.   Genitourinary: Negative for dysuria and urgency.   Musculoskeletal: Negative for back pain and myalgias.   Skin: Negative for itching and rash.   Neurological: Negative for dizziness, sensory change, speech change, weakness and headaches.   Endo/Heme/Allergies: Does not bruise/bleed easily.   Psychiatric/Behavioral: Negative for depression and suicidal ideas.        Physical Exam  Temp:  [36.6 °C (97.8 °F)-36.8 °C (98.3 °F)] 36.6 °C (97.9 °F)  Pulse:  [87-94] 87  Resp:  [16-19] 18  BP: (104-112)/(40-61) 104/44  SpO2:  [95 %-99 %] 95 %    Physical Exam  Vitals reviewed.   Constitutional:       General: He is not in acute distress.     Appearance: Normal appearance. He is well-developed. He is obese. He is not diaphoretic.   HENT:      Head: Normocephalic and atraumatic.      Right Ear: External ear normal.       Left Ear: External ear normal.      Nose: No congestion or rhinorrhea.      Mouth/Throat:      Mouth: Mucous membranes are moist.      Pharynx: Oropharynx is clear. No oropharyngeal exudate or posterior oropharyngeal erythema.   Eyes:      General: No scleral icterus.     Extraocular Movements: Extraocular movements intact.      Conjunctiva/sclera: Conjunctivae normal.      Pupils: Pupils are equal, round, and reactive to light.   Cardiovascular:      Rate and Rhythm: Normal rate and regular rhythm.      Heart sounds: No murmur heard.   No friction rub. No gallop.    Pulmonary:      Breath sounds: No stridor. Rales present. No rhonchi.      Comments: occasional wheezing   Abdominal:      General: Abdomen is flat. Bowel sounds are normal. There is no distension.      Palpations: Abdomen is soft. There is no mass.      Tenderness: There is no abdominal tenderness.   Musculoskeletal:         General: No swelling or tenderness.      Cervical back: Neck supple. No rigidity. No muscular tenderness.      Comments: Right BKA  Left BKA with tender surgical site   Lymphadenopathy:      Cervical: No cervical adenopathy.   Skin:     General: Skin is warm and dry.      Findings: No erythema or rash.   Neurological:      General: No focal deficit present.      Mental Status: He is alert and oriented to person, place, and time. Mental status is at baseline.      Cranial Nerves: No cranial nerve deficit.      Sensory: No sensory deficit.      Motor: No weakness.   Psychiatric:         Mood and Affect: Mood normal.         Behavior: Behavior normal.         Thought Content: Thought content normal.         Fluids    Intake/Output Summary (Last 24 hours) at 6/2/2021 1205  Last data filed at 6/2/2021 0334  Gross per 24 hour   Intake 340 ml   Output 550 ml   Net -210 ml       Laboratory  Recent Labs     05/31/21  0537 06/01/21  0500 06/02/21  0515   WBC 11.0* 11.1* 12.6*   RBC 2.42* 2.42* 2.43*   HEMOGLOBIN 7.9* 8.0* 8.0*   HEMATOCRIT  25.2* 25.0* 24.9*   .1* 103.3* 102.5*   MCH 32.6 33.1* 32.9   MCHC 31.3* 32.0* 32.1*   RDW 61.6* 61.1* 61.2*   PLATELETCT 383 361 343   MPV 9.5 9.1 8.9*     Recent Labs     05/31/21  0537 06/01/21  0500 06/02/21  0515   SODIUM 131* 134* 132*   POTASSIUM 4.6 4.6 4.3   CHLORIDE 94* 97 95*   CO2 26 27 25   GLUCOSE 104* 104* 94   BUN 41* 27* 19   CREATININE 6.99* 5.21* 4.19*   CALCIUM 8.8 8.7 8.7                   Imaging  DX-CHEST-LIMITED (1 VIEW)   Final Result      1.  Moderate right-sided pleural effusion, increased from previous exam.   2.  Slightly worsened diffuse interstitial edema.      DX-CHEST-LIMITED (1 VIEW)   Final Result      1.  Cardiomegaly is pulmonary vascular congestion and interstitial edema.   2.  Layering right pleural effusion with overlying atelectasis/consolidation.   3.  Trace pleural fluid versus pleural thickening at the left lung base.   4.  Left lung base calcification is again seen which may related to prior infection or trauma.         US-HEMODIALYSIS GRAFT DUPLEX COMP UPPER EXTREMITY   Final Result      US-EXTREMITY ARTERY LOWER BILAT   Final Result      US-PATSY SINGLE LEVEL BILAT   Final Result      DX-FOOT-2- LEFT   Final Result      1.  Soft tissue swelling of LEFT 4th toe.   2.  No gross bony destruction however osteomyelitis is not excluded by plain film.   3.  Prior partial amputation of great toe.      DX-CHEST-PORTABLE (1 VIEW)   Final Result      Left lower lobe pleural thickening and calcification may be related to sequelae of prior infection or trauma.      Patchy left basilar opacity may represent atelectasis or pneumonitis.      Mild cardiomegaly.      Atherosclerotic plaque.              Assessment/Plan  * Cellulitis of left foot- (present on admission)  Assessment & Plan  One dose of vancomycin given in the ER  On 5/22, foot wound culture grew ESBL  Wound culture growing Klebsiella pneumonia ESBL, but use me diabetes, and Staph aureus   ID with final recommendation  is to discontinue IV antibiotics as patient is achieved effective source control with below-knee amputation.  Resolved    CHF (congestive heart failure) (HCC)  Assessment & Plan  Patient with history of CHFrEF of 30%  Persistent supplemental oxygen needs and worsening interstitial edema noted on chest imaging  Pending procal to rule out infection  Start IV diuretics, daily weight, and strict intake and output     S/P BKA (below knee amputation) unilateral, left (HCC)  Assessment & Plan  Status post left BKA on 5/28/2023 1  Scheduled and as needed pain mgmt  Orthopedic surgery following for post BKA care  Labs on AM    DNR (do not resuscitate)- (present on admission)  Assessment & Plan  Discussed with Mr. Sepulveda and he confirmed this status.    Ischemia of toe- (present on admission)  Assessment & Plan  S/P left BKA on 5/28  Resolved    Advance care planning- (present on admission)  Assessment & Plan  In a previous visit, he had indicated to palliative that he would consider hospice/comfort care  Currently, vascular recommend surgery and patient is agreeable  Consulted palliative care for further goals of care discussion after surgery    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)- (present on admission)  Assessment & Plan  Right BKA and left great toe amputation     Acute respiratory failure with hypoxia (HCC)  Assessment & Plan  Requiring 4 L nasal cannula with a saturation  Secondary to fluid overload from not completing dialysis  Nephrology following, patient for no trial of hemodialysis today  Patient also states he has a remote history of COPD, pending evaluation by respiratory therapy    Cardiomyopathy, ischemic- (present on admission)  Assessment & Plan  EF from heart cath 5/10 15-20%    Peripheral vascular disease (HCC)- (present on admission)  Assessment & Plan  Hx of multiple amputations     Hypotension- (present on admission)  Assessment & Plan  Chronic condition  Discontinue  coreg  Continue home midodrine  Resolved    Paroxysmal A-fib (HCC)- (present on admission)  Assessment & Plan  Chronic condition  Hold Eliquis in case he needs surgery and utilize SQ heparin for DVT prophylaxis    End stage renal disease on dialysis (HCC)- (present on admission)  Assessment & Plan  Dialysis via left arm fistula T,Th,S  Dr. Hodges, nephrology was consulted from the ER    Type 2 diabetes mellitus with kidney complication, with long-term current use of insulin (HCC)- (present on admission)  Assessment & Plan  With hyperglycemia  Continue sliding scale  Diabetic diet    Macrocytic anemia- (present on admission)  Assessment & Plan  Hemoglobin 6.5 today  No signs of GI bleed  Vit B12/folate unremarkable  Secondary to anemia of chronic kidney disease  Transfuse 1 unit of PRBC  Nephrology following, recommending iron repletion  Continue erythropoietin as per nephrology    CAD (coronary artery disease)- (present on admission)  Assessment & Plan  [facility]  No active chest pain at this time    Hyponatremia- (present on admission)  Assessment & Plan  Mild, continue to monitor    Pulmonary edema  Assessment & Plan  Noted on 5/30/2021 chest x-ray  Complicated by acute hypoxic respiratory failure requiring 4 L nasal cannula  Likely secondary to fluid overload from difficulties accessing IV fistula/poor flow  Nephrology following, patient for another attempt of hemodialysis today  Labs on a.m.       VTE prophylaxis: apixaban

## 2021-06-02 NOTE — ASSESSMENT & PLAN NOTE
Patient with history of CHFrEF of 30%  Persistent supplemental oxygen needs and worsening interstitial edema noted on chest imaging  Lasix ineffective in setting of ESRD  Will need to continue dialysis

## 2021-06-02 NOTE — HEART FAILURE PROGRAM
Patient admitted for cellulitis of LE which ended up requiring BKA. Recently diagnosed ischemic cardiomyopathy.     It looks like patient's dialysis access was compromised which has resulted in inadequate fluid removal causing fluid overload.    At this time, heart failure is not being noted as the issue causing the fluid overload. So long as this remains the case, the heart failure checklist and 7 day appointment are not indicated.    With that said, this admission is an all cause heart failure readmission.    It looks like patient cancelled his follow up appointment with the heart failure clinic since he was admitted. This is an extremely important part of keeping patient on track with his heart failure treatment so I have asked the schedulers to reschedule him in a few weeks as his dispo plan is for snf.    Thank you, Madison, Cardio RN Navigator [phone]

## 2021-06-02 NOTE — CARE PLAN
The patient is Stable - Low risk of patient condition declining or worsening    Shift Goals  Clinical Goals: pain management, bowel movement, improved respiratory status  Patient Goals: comfort    Progress made toward(s) clinical / shift goals:  Went over POC.    Patient is not progressing towards the following goals:

## 2021-06-02 NOTE — PROGRESS NOTES
[facility] Nephrology Daily Progress Note    Date of Service  6/2/2021    Author: Marvin Mahajan M.D.    Chief Complaint  76 y/o man with ESRD HD T,TH,Sat at [place] presented with hypotension and inability to HD. On routine labs noted to have hyperkalemia.  Pt has increased erythema of 4th toe on left with some spreading erythema. Pt reports that this is worsening over the last few weeks, but though it secondary to trauma.     No F/C/N/V/CP/SOB.  No melena, hematochezia, hematemesis.  No HA, visual changes, or abdominal pain.    Daily Nephrology Summary:   5/20 - Consult done  5/21 - sitting up in bed, feeling good, appetite good, mild non-productive cough, tolerated HD yesterday UF: 158ml  5/22 - seen on HD, some hypotension this am, limited UF with HD, vascular to consult   5/23 - sitting up in bed, c/o severe pain in foot and L hand, vascular saw pt and plan poss intervention   5/24 - NAEO, feels good, having issues with dropping objects recently over the past 3-4 days  5/25 - NAEO, no complaints, sleeping comfortably  5/26 - NAEO, no complaints  5/27 - NAEO, no complaints, feels good  5/28 - NAEO, no complaints, scheduled for BKA today, family at bedside  5/29 - NAEO, no complaints today.  Per dialysis nurse clots on arterial line when initiating dialysis.  High arterial pressures.  5/30 - HD stopped early yesterday due to clotting, AVG U/S with patency  5/31 - NAEO, no complaints, feels good  6/01 - NAEO, no complaints, feels good, cannulation with 15G needles today  6/02 - NAEO, no complaints    Review of Systems  Review of Systems   Constitutional: Negative for chills and fever.   Respiratory: Negative for cough and shortness of breath.    Cardiovascular: Negative for chest pain and leg swelling.   Gastrointestinal: Negative for nausea and vomiting.   Neurological: Negative for seizures and loss of consciousness.   All other systems reviewed and are negative.    Physical Exam  Temp:  [36.6 °C (97.8  °F)-37.3 °C (99.1 °F)] 36.6 °C (97.9 °F)  Pulse:  [87-95] 87  Resp:  [16-19] 18  BP: (104-112)/(39-61) 104/44  SpO2:  [95 %-99 %] 95 %    Physical Exam  Vitals and nursing note reviewed.   Constitutional:       Appearance: Normal appearance.   HENT:      Head: Normocephalic and atraumatic.   Eyes:      General: No scleral icterus.        Right eye: No discharge.         Left eye: No discharge.   Cardiovascular:      Rate and Rhythm: Normal rate and regular rhythm.   Pulmonary:      Effort: Pulmonary effort is normal.      Breath sounds: Normal breath sounds.   Abdominal:      General: Bowel sounds are normal.      Palpations: Abdomen is soft.   Musculoskeletal:         General: Deformity (Left foot amputation) present. No tenderness.      Cervical back: Neck supple. No muscular tenderness.      Right lower leg: No edema.      Left lower leg: No edema.   Neurological:      Mental Status: He is alert. Mental status is at baseline.     Fluids    Intake/Output Summary (Last 24 hours) at 6/2/2021 1134  Last data filed at 6/2/2021 0334  Gross per 24 hour   Intake 340 ml   Output 550 ml   Net -210 ml     Laboratory  Recent Labs     05/31/21  0537 06/01/21  0500 06/02/21  0515   WBC 11.0* 11.1* 12.6*   RBC 2.42* 2.42* 2.43*   HEMOGLOBIN 7.9* 8.0* 8.0*   HEMATOCRIT 25.2* 25.0* 24.9*   .1* 103.3* 102.5*   MCH 32.6 33.1* 32.9   MCHC 31.3* 32.0* 32.1*   RDW 61.6* 61.1* 61.2*   PLATELETCT 383 361 343   MPV 9.5 9.1 8.9*     Recent Labs     05/31/21  0537 06/01/21  0500 06/02/21  0515   SODIUM 131* 134* 132*   POTASSIUM 4.6 4.6 4.3   CHLORIDE 94* 97 95*   CO2 26 27 25   GLUCOSE 104* 104* 94   BUN 41* 27* 19   CREATININE 6.99* 5.21* 4.19*   CALCIUM 8.8 8.7 8.7         No results for input(s): NTPROBNP in the last 72 hours.        Imaging  - Reviewed    Assessment  # ESRD   maint HD q  T/T/S via   AVG. Left arm (+thrill/bruit)   U/S was patent   AVG cannulated without issue today, seems to be dependent on BP, when good easy  to cannulate  # Hypotension: cardiogenic vs toe/cellultis   midodrine  # PVD with left 4th great toe discoloration   Per vascular    Non-healing and scheduled for BKA today (5/28)  # Cardiomyopathy: acute worsening. EF now 15-20%  # 80% ostial LAD in-stent restenosis:   # Steal syndrome: s/p DRI. Worsening apparent ischmia in setting of heart failure  # Anemia in CKD   Iron sat okay / ferritin elevated   ARMANDO   # Atrial fibrillation   plavix    BB  # Diabetes mellitus  # Hyperthyroidism   Tapazole  # Hyponatremia, resolved    UF with HD   # Hyperkalemia, corrected    Manage with HD   # Hyperphosphatemia   sevelamer TID with meals   # BMD-CKD   Calcitriol   Sevelamer     PLAN:  - TTS iHD  - UF with HD as tolerated   - No need for vascular eval at this time, will refer to AC as OP, unless further issues develop  - Continue binders  - Dose all meds per ESRD

## 2021-06-02 NOTE — PROGRESS NOTES
Patient seen and evaluated at bedside    Doing well from a pain standpoint in left lower extremity. He has been able to transfer to the commode but has not tried transferring into wheelchair      Exam:  Dressings clean and dry  Good ROM of knee, 0-120 flexion      75M POD 5 s/p left bka  Clinical pictures of wounds healing well  NWB LLE  Okay to use RLE prosthesis for transfers  Change dressings as needed  PT/OT  Appreciate medical management  Follow up with me in clinic in 2 weeks      Jarett Márquez MD

## 2021-06-02 NOTE — PROGRESS NOTES
Removed patients IV due to infiltration. Put in order for ultrasound guided IV however patient has no IV medications to be administered at this time. Hospitalist notified and is okay with patient not having and IV at this time.

## 2021-06-02 NOTE — PROGRESS NOTES
A+Ox4, VSS, 4L via NC to maintain sats. Pt short of breath and crackles auscultated in the lungs. RT assessed pt. MD notified and sitting on side of bed to do IS recommended. Pt reporting significant abd discomfort- suppository given and pt had BM. Tomas in place for urinary retention. Q2 turns and mepilex to prevent skin breakdown.   Tramadol and oxycodone for pain control.  Dressing in place to L BKA C/D/I.

## 2021-06-02 NOTE — PROGRESS NOTES
3 RN Skin Check     Devices in place: Nasal Cannula.  Skin assessed under devices: yes.  Confirmed pressure ulcers found on: sacrum  New potential pressure ulcers noted on N/A.  Wound consult placed Yes.  The following interventions in place Mepilex, Q2 turns in place, patient on low air loss mattress. TAPS in use.     Patient has dressing in place for LLE. Dressing is clean, dry and intact.  Patient has previous right BKA that is ALHAJI.  Bruising and scattered abrasions noted on RUE.  Bruising noted on left side of abdomen.  Patient has sacrum pressure sore. Mepilex in place and dressing to be changed Q72 hours.

## 2021-06-02 NOTE — CARE PLAN
The patient is Stable - Low risk of patient condition declining or worsening    Shift Goals  Clinical Goals: pain management, bowel movement, improved respiratory status  Patient Goals: comfort    Progress made toward(s) clinical / shift goals: Tramadol and oxycodone for pain, q2 turns and mepilex to prevent skin breakdown. 4L O2 via NC, educated on IS.       Problem: Pain - Standard  Goal: Alleviation of pain or a reduction in pain to the patient’s comfort goal  Outcome: Progressing     Problem: Skin Integrity  Goal: Skin integrity is maintained or improved  Outcome: Progressing     Problem: Respiratory  Goal: Patient will achieve/maintain optimum respiratory ventilation and gas exchange  Outcome: Progressing

## 2021-06-03 NOTE — PROGRESS NOTES
2 RN Skin Check    2 RN skin check complete with Benjy MONTOYA.     Devices in place: silicone nasal cannula, continuous pulse oximeter finger probe, right upper arm PIV.  Skin assessed under devices: yes    Confirmed pressure ulcers found on: sacrum tear, no drainage noted.   New potential pressure ulcers noted on: N/A.   Wound consult placed: already placed    The following interventions in place gray foam to silicone nasal cannula, skin checks with frequent turns q2H, low air loss mattress with TAPS and wedges for turns, mepilex to sacrum.    Skin assessment: Bilateral BKA. Ace wrap and dressing to left stump. Bilateral upper extremity bruising and redness. Bruising to bilateral lower abdomen.  Left AV fistula.

## 2021-06-03 NOTE — PROGRESS NOTES
6/2/2021 2222 Pt reports SOB. Pt on 2LNC O2 sat at 96%. Incentive spirometer volume 750ml. Strong non-productive, wet-sounding cough. Lung sounds coarse crackles throughout all lobes. Noted pt PIV SL on scheduled lasix 40mg BID. Pt reports dialysis yesterday 6/1/2021. Hospitalist on call paged an notified. Continue incentive spirometry per hospitalist. No other new orders.

## 2021-06-03 NOTE — PROGRESS NOTES
[facility] Nephrology Daily Progress Note    Date of Service  6/3/2021    Author: Marvin Mahajan M.D.    Chief Complaint  76 y/o man with ESRD HD T,TH,Sat at [place] presented with hypotension and inability to HD. On routine labs noted to have hyperkalemia.  Pt has increased erythema of 4th toe on left with some spreading erythema. Pt reports that this is worsening over the last few weeks, but though it secondary to trauma.     No F/C/N/V/CP/SOB.  No melena, hematochezia, hematemesis.  No HA, visual changes, or abdominal pain.    Daily Nephrology Summary:   5/20 - Consult done  5/21 - sitting up in bed, feeling good, appetite good, mild non-productive cough, tolerated HD yesterday UF: 158ml  5/22 - seen on HD, some hypotension this am, limited UF with HD, vascular to consult   5/23 - sitting up in bed, c/o severe pain in foot and L hand, vascular saw pt and plan poss intervention   5/24 - NAEO, feels good, having issues with dropping objects recently over the past 3-4 days  5/25 - NAEO, no complaints, sleeping comfortably  5/26 - NAEO, no complaints  5/27 - NAEO, no complaints, feels good  5/28 - NAEO, no complaints, scheduled for BKA today, family at bedside  5/29 - NAEO, no complaints today.  Per dialysis nurse clots on arterial line when initiating dialysis.  High arterial pressures.  5/30 - HD stopped early yesterday due to clotting, AVG U/S with patency  5/31 - NAEO, no complaints, feels good  6/01 - NAEO, no complaints, feels good, cannulation with 15G needles today  6/02 - NAEO, no complaints  6/03 - NAEO, no complaints, resting comfortably    Review of Systems  Review of Systems   Constitutional: Negative for chills and fever.   Respiratory: Negative for cough and shortness of breath.    Cardiovascular: Negative for chest pain and leg swelling.   Gastrointestinal: Negative for nausea and vomiting.   Neurological: Negative for seizures and loss of consciousness.   All other systems reviewed and are  negative.    Physical Exam  Temp:  [36.4 °C (97.6 °F)-36.7 °C (98 °F)] 36.4 °C (97.6 °F)  Pulse:  [] 92  Resp:  [16-20] 16  BP: (101-126)/(42-75) 122/61  SpO2:  [95 %-98 %] 96 %    Physical Exam  Vitals and nursing note reviewed.   Constitutional:       Appearance: Normal appearance.   HENT:      Head: Normocephalic and atraumatic.   Eyes:      General: No scleral icterus.        Right eye: No discharge.         Left eye: No discharge.   Cardiovascular:      Rate and Rhythm: Normal rate and regular rhythm.   Pulmonary:      Effort: Pulmonary effort is normal.      Breath sounds: Normal breath sounds.   Abdominal:      General: Bowel sounds are normal.      Palpations: Abdomen is soft.   Musculoskeletal:         General: Deformity (Left foot amputation) present. No tenderness.      Cervical back: Neck supple. No muscular tenderness.      Right lower leg: No edema.      Left lower leg: No edema.   Neurological:      Mental Status: He is alert. Mental status is at baseline.     Fluids    Intake/Output Summary (Last 24 hours) at 6/3/2021 1127  Last data filed at 6/3/2021 1000  Gross per 24 hour   Intake 240 ml   Output 700 ml   Net -460 ml     Laboratory  Recent Labs     06/01/21  0500 06/02/21  0515 06/03/21  0415   WBC 11.1* 12.6* 11.0*   RBC 2.42* 2.43* 2.50*   HEMOGLOBIN 8.0* 8.0* 8.2*   HEMATOCRIT 25.0* 24.9* 26.3*   .3* 102.5* 105.2*   MCH 33.1* 32.9 32.8   MCHC 32.0* 32.1* 31.2*   RDW 61.1* 61.2* 63.7*   PLATELETCT 361 343 443   MPV 9.1 8.9* 9.3     Recent Labs     06/01/21  0500 06/02/21  0515 06/03/21  0415   SODIUM 134* 132* 131*   POTASSIUM 4.6 4.3 4.8   CHLORIDE 97 95* 93*   CO2 27 25 26   GLUCOSE 104* 94 101*   BUN 27* 19 32*   CREATININE 5.21* 4.19* 5.46*   CALCIUM 8.7 8.7 8.9         Recent Labs     06/02/21  1427   NTPROBNP >38378*           Imaging  - Reviewed    Assessment  # ESRD   maint HD q  T/T/S via   AVG. Left arm (+thrill/bruit)   U/S was patent   AVG cannulated without issue today,  seems to be dependent on BP, when good easy to cannulate  # Hypotension: cardiogenic vs toe/cellultis   midodrine  # PVD with left 4th great toe discoloration   Per vascular    Non-healing and scheduled for BKA today (5/28)  # Cardiomyopathy: acute worsening. EF now 15-20%  # 80% ostial LAD in-stent restenosis:   # Steal syndrome: s/p DRI. Worsening apparent ischmia in setting of heart failure  # Anemia in CKD   Iron sat okay / ferritin elevated   ARMANDO   # Atrial fibrillation   plavix    BB  # Diabetes mellitus  # Hyperthyroidism   Tapazole  # Hyponatremia, resolved    UF with HD   # Hyperkalemia, corrected    Manage with HD   # Hyperphosphatemia   sevelamer TID with meals   # BMD-CKD   Calcitriol   Sevelamer     PLAN:  - TTS iHD  - UF with HD as tolerated   - No need for vascular eval at this time, will refer to AC as OP, unless further issues develop  - Continue binders  - Dose all meds per ESRD

## 2021-06-03 NOTE — PROGRESS NOTES
"Bedside report received.  Assessment complete.  A&O x 4. Patient calls appropriately.  Patient ambulates with max assist for transfers. Bed alarm on.   Patient has 4/10 pain. Pain managed with prescribed medications.  Denies N&V. Tolerating renal diet.  Surgical dressing CDI.  Tomas in place to void, + flatus, + BM.  Patient denies SOB.  SCD's off.  Patient is mildly anxious and cooperating with the care plan.  Review plan with of care with patient. Call light and personal belongings with in reach. Hourly rounding in place. All needs met at this time.  /61   Pulse 92   Temp 36.4 °C (97.6 °F) (Temporal)   Resp 16   Ht 1.676 m (5' 6\")   Wt 92.5 kg (203 lb 14.8 oz)   SpO2 96%   BMI 32.91 kg/m²     "

## 2021-06-03 NOTE — PROGRESS NOTES
[facility] Medicine Daily Progress Note    Date of Service  6/3/2021    Chief Complaint  75 y.o. male admitted 5/20/2021 with weakness and dizziness    Hospital Course  75-year-old male with a past medical history of end-stage renal disease on dialysis, peripheral vascular disease status post right below the knee amputation, insulin-dependent diabetes mellitus, there was most recently admitted here from 5/10/2021 through 5/18/2021 when he underwent heart catheterization revealing ejection fraction of 15 to 20% with 80% ostial LAD in-stent restenosis and distal RCA disease was admitted on 5/20/2021 for cellulitis of the left fourth toe foot secondary to ischemic.    Patient was evaluated by vascular surgery which determined that patient was not a candidate for revascularization procedures.  Infectious disease following for which she was started on a short course of meropenem.  He underwent left BKA on 5/28/2021 which she tolerated well.  IV antibiotics discontinued by infectious disease shortly after.  Throughout hospital course, nephrology having difficulty with dialysis secondary to poor AV fistula flow.  Patient underwent arterial AV fistula Doppler which showed fistula is patent.  Nephrology was able to use AV fistula successfully during hemodialysis.  Current recommendations to follow-up in outpatient setting.    Interval Problem Update    6/1/21: The patient was seen and evaluated at bedside and appears comfortable.  Patient states that his pain is currently well controlled after undergoing left BKA.  He is tolerating oral intake and denies any chest pains, palpitations, or shortness of breath.  Of note, patient still requiring 3 to 4 L via nasal cannula.  He states he has a history of COPD we are pending respiratory therapist evaluation.  He denies any fever/chills or productive cough.  No other overnight events reported.      6/2/2021: The patient seen and evaluated at bedside and appears comfortable however he  still has persistent supplemental oxygen needs.  Repeat chest x-ray shows worsening right-sided pleural effusion as well as interstitial edema.  This is likely multifactorial in the setting of noncompliance with hemodialysis as well as history of congestive heart failure reduced ejection fraction.  He will continue with dialysis sessions and we will begin IV diuresis as tolerated.  Pending procalcitonin to rule out infectious cause.  At this time patient denies any productive cough, no chest pains, palpitations, or nausea/vomiting.  No other overnight events reported by nursing staff.    6/3/2021: The patient was seen and evaluated bedside and states that his breathing has somewhat improved with nebulization therapy as well as IV diuretics.  There was initial concern for infection however this has been ruled out with improving white count and absence of fever/productive cough.  Patient will need to continue dialysis to improve volume status.  He is tolerating oral intake and states pain is well controlled.  We are pending placement to SNF for continued physical therapy.  No other overnight events reported.      Disposition  Pending SNF     Review of Systems  Review of Systems   Constitutional: Negative for chills and fever.   HENT: Negative for hearing loss and tinnitus.    Eyes: Negative for blurred vision, double vision and photophobia.   Respiratory: Positive for shortness of breath. Negative for cough and wheezing.    Cardiovascular: Negative for chest pain and palpitations.   Gastrointestinal: Negative for abdominal pain, nausea and vomiting. Diarrhea: loose Stools.   Genitourinary: Negative for dysuria and urgency.   Musculoskeletal: Negative for back pain and myalgias.   Skin: Negative for itching and rash.   Neurological: Negative for dizziness, sensory change, speech change and headaches.   Endo/Heme/Allergies: Does not bruise/bleed easily.   Psychiatric/Behavioral: Negative for depression and suicidal  ideas.        Physical Exam  Temp:  [36.4 °C (97.6 °F)-36.7 °C (98 °F)] 36.4 °C (97.6 °F)  Pulse:  [] 92  Resp:  [16-20] 16  BP: (101-126)/(42-75) 122/61  SpO2:  [95 %-98 %] 96 %    Physical Exam  Vitals reviewed.   Constitutional:       General: He is not in acute distress.     Appearance: Normal appearance. He is well-developed. He is obese. He is not diaphoretic.   HENT:      Head: Normocephalic and atraumatic.      Right Ear: External ear normal.      Left Ear: External ear normal.      Nose: No congestion or rhinorrhea.      Mouth/Throat:      Mouth: Mucous membranes are moist.      Pharynx: Oropharynx is clear. No oropharyngeal exudate or posterior oropharyngeal erythema.   Eyes:      General: No scleral icterus.     Extraocular Movements: Extraocular movements intact.      Conjunctiva/sclera: Conjunctivae normal.      Pupils: Pupils are equal, round, and reactive to light.   Cardiovascular:      Rate and Rhythm: Normal rate and regular rhythm.      Pulses: Normal pulses.      Heart sounds: Normal heart sounds. No murmur heard.   No friction rub. No gallop.    Pulmonary:      Effort: Pulmonary effort is normal.      Breath sounds: No stridor. No rales.      Comments: occasional wheezing   Abdominal:      General: Abdomen is flat. Bowel sounds are normal.      Palpations: Abdomen is soft.   Musculoskeletal:         General: No swelling or tenderness.      Cervical back: Neck supple. No rigidity. No muscular tenderness.      Comments: Right BKA  Left BKA with tender surgical site   Lymphadenopathy:      Cervical: No cervical adenopathy.   Skin:     General: Skin is warm and dry.      Findings: No erythema or rash.   Neurological:      General: No focal deficit present.      Mental Status: He is alert and oriented to person, place, and time. Mental status is at baseline.      Cranial Nerves: No cranial nerve deficit.      Sensory: No sensory deficit.   Psychiatric:         Mood and Affect: Mood normal.          Behavior: Behavior normal.         Thought Content: Thought content normal.         Fluids    Intake/Output Summary (Last 24 hours) at 6/3/2021 1121  Last data filed at 6/3/2021 1000  Gross per 24 hour   Intake 240 ml   Output 700 ml   Net -460 ml       Laboratory  Recent Labs     06/01/21  0500 06/02/21  0515 06/03/21  0415   WBC 11.1* 12.6* 11.0*   RBC 2.42* 2.43* 2.50*   HEMOGLOBIN 8.0* 8.0* 8.2*   HEMATOCRIT 25.0* 24.9* 26.3*   .3* 102.5* 105.2*   MCH 33.1* 32.9 32.8   MCHC 32.0* 32.1* 31.2*   RDW 61.1* 61.2* 63.7*   PLATELETCT 361 343 443   MPV 9.1 8.9* 9.3     Recent Labs     06/01/21  0500 06/02/21  0515 06/03/21  0415   SODIUM 134* 132* 131*   POTASSIUM 4.6 4.3 4.8   CHLORIDE 97 95* 93*   CO2 27 25 26   GLUCOSE 104* 94 101*   BUN 27* 19 32*   CREATININE 5.21* 4.19* 5.46*   CALCIUM 8.7 8.7 8.9                   Imaging  IR-US GUIDED PIV   Final Result    Ultrasound-guided PERIPHERAL IV INSERTION performed by    qualified nursing staff as above.      DX-CHEST-LIMITED (1 VIEW)   Final Result      1.  Moderate right-sided pleural effusion, increased from previous exam.   2.  Slightly worsened diffuse interstitial edema.      DX-CHEST-LIMITED (1 VIEW)   Final Result      1.  Cardiomegaly is pulmonary vascular congestion and interstitial edema.   2.  Layering right pleural effusion with overlying atelectasis/consolidation.   3.  Trace pleural fluid versus pleural thickening at the left lung base.   4.  Left lung base calcification is again seen which may related to prior infection or trauma.         US-HEMODIALYSIS GRAFT DUPLEX COMP UPPER EXTREMITY   Final Result      US-EXTREMITY ARTERY LOWER BILAT   Final Result      US-PATSY SINGLE LEVEL BILAT   Final Result      DX-FOOT-2- LEFT   Final Result      1.  Soft tissue swelling of LEFT 4th toe.   2.  No gross bony destruction however osteomyelitis is not excluded by plain film.   3.  Prior partial amputation of great toe.      DX-CHEST-PORTABLE (1 VIEW)    Final Result      Left lower lobe pleural thickening and calcification may be related to sequelae of prior infection or trauma.      Patchy left basilar opacity may represent atelectasis or pneumonitis.      Mild cardiomegaly.      Atherosclerotic plaque.              Assessment/Plan  * Cellulitis of left foot- (present on admission)  Assessment & Plan  One dose of vancomycin given in the ER  On 5/22, foot wound culture grew ESBL  Wound culture growing Klebsiella pneumonia ESBL, but use me diabetes, and Staph aureus   ID with final recommendation is to discontinue IV antibiotics as patient is achieved effective source control with below-knee amputation.  Resolved    CHF (congestive heart failure) ([facility])  Assessment & Plan  Patient with history of CHFrEF of 30%  Persistent supplemental oxygen needs and worsening interstitial edema noted on chest imaging  Pending procal to rule out infection  Start IV diuretics, daily weight, and strict intake and output     S/P BKA (below knee amputation) unilateral, left ([facility])  Assessment & Plan  Status post left BKA on 5/28/2023 1  Scheduled and as needed pain mgmt  Orthopedic surgery following for post BKA care  Labs on AM    DNR (do not resuscitate)- (present on admission)  Assessment & Plan  Discussed with Mr. Sepulveda and he confirmed this status.    Ischemia of toe- (present on admission)  Assessment & Plan  S/P left BKA on 5/28  Resolved    Advance care planning- (present on admission)  Assessment & Plan  In a previous visit, he had indicated to palliative that he would consider hospice/comfort care  Currently, vascular recommend surgery and patient is agreeable  Consulted palliative care for further goals of care discussion after surgery    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)- (present on admission)  Assessment & Plan  Right BKA and left great toe amputation     Acute respiratory failure with hypoxia ([facility])  Assessment & Plan  Requiring 4 L nasal  cannula with a saturation  Secondary to fluid overload from not completing dialysis  Nephrology following, patient for no trial of hemodialysis today  Patient also states he has a remote history of COPD, pending evaluation by respiratory therapy    Cardiomyopathy, ischemic- (present on admission)  Assessment & Plan  EF from heart cath 5/10 15-20%    Peripheral vascular disease (HCC)- (present on admission)  Assessment & Plan  Hx of multiple amputations     Hypotension- (present on admission)  Assessment & Plan  Chronic condition  Discontinue coreg  Continue home midodrine  Resolved    Paroxysmal A-fib (HCC)- (present on admission)  Assessment & Plan  Chronic condition  Hold Eliquis in case he needs surgery and utilize SQ heparin for DVT prophylaxis    End stage renal disease on dialysis (HCC)- (present on admission)  Assessment & Plan  Dialysis via left arm fistula T,Th,S  Dr. Hodges, nephrology was consulted from the ER    Type 2 diabetes mellitus with kidney complication, with long-term current use of insulin ([facility])- (present on admission)  Assessment & Plan  With hyperglycemia  Continue sliding scale  Diabetic diet    Macrocytic anemia- (present on admission)  Assessment & Plan  Hemoglobin 6.5 today  No signs of GI bleed  Vit B12/folate unremarkable  Secondary to anemia of chronic kidney disease  Transfuse 1 unit of PRBC  Nephrology following, recommending iron repletion  Continue erythropoietin as per nephrology    CAD (coronary artery disease)- (present on admission)  Assessment & Plan  With hx of stents followed by [facility]  No active chest pain at this time    Hyponatremia- (present on admission)  Assessment & Plan  Mild, continue to monitor    Pulmonary edema  Assessment & Plan  Noted on 5/30/2021 chest x-ray  Complicated by acute hypoxic respiratory failure requiring 4 L nasal cannula  Likely secondary to fluid overload from difficulties accessing IV fistula/poor flow  Nephrology following, patient for  another attempt of hemodialysis today  Labs on a.m.       VTE prophylaxis: apixaban

## 2021-06-03 NOTE — CARE PLAN
Problem: Skin Integrity  Goal: Skin integrity is maintained or improved  Outcome: Progressing     Problem: Diabetes Management  Goal: Patient will achieve and maintain glucose in satisfactory range  Outcome: Progressing     Problem: Knowledge Deficit - Diabetes  Goal: Patient will demonstrate knowledge of insulin injection, symptoms, and treatment of hypoglycemia and diet prior to discharge  Outcome: Progressing     Problem: Discharge Planning - Diabetes  Goal: Patient's continuum of care needs will be met  Outcome: Progressing     Problem: Skin Integrity - Diabetes  Goal: Patient's skin on legs and feet will remain intact while hospitalized  Outcome: Progressing     Problem: Fluid Volume  Goal: Fluid volume balance will be maintained  Outcome: Progressing   The patient is Stable - Low risk of patient condition declining or worsening    Shift Goals  Clinical Goals: remain free of falls  Patient Goals: get dialysis    Progress made toward(s) clinical / shift goals:  Patient remained free from falls    Patient is not progressing towards the following goals:

## 2021-06-03 NOTE — DISCHARGE PLANNING
Anticipated Discharge Disposition: SNF    Action: VM left for Alanna with [facility] to f/u on pt's referral     Barriers to Discharge: SNF acceptance    Plan: F/U with SNF

## 2021-06-03 NOTE — DISCHARGE PLANNING
Agency/Facility Name: [facility]   Outcome: Voice mail left regarding referral. Requested a call back.

## 2021-06-04 NOTE — DISCHARGE PLANNING
Anticipated Discharge Disposition: SNF    Action: Lsw called and left vm for Alanna at [facility] for possible weekend transfer.    Barriers to Discharge: SNF acceptance    Plan: Lsw to f/u

## 2021-06-04 NOTE — THERAPY
Occupational Therapy  Daily Treatment     Patient Name: Luis Sepulveda  Age:  75 y.o., Sex:  male  Medical Record #: [id number]  Today's Date: 6/4/2021     Precautions  Precautions: Fall Risk  Comments: new L BKA, prior R BKA    Assessment    Pt seen for OT tx today, progressing as expected, requires multiple seated restbreaks throughout the session d/t increased effort of breathing with supplemental 4L O2 by nc. Pt is limited by pain, L hand f-  strength, decreased FM coordination during ADLs, LE weakness, decreased motor control and activity tolerance. Pt s/u with L hand strengthening program with light resistance foam block; demonstrated understanding. Pt gives good effort during session and is motivated to regain his indepenence, will benefit from post acute placement to maximize pt's safety and I with ADLs and mobility.     Plan    Continue current treatment plan.    DC Equipment Recommendations: Unable to determine at this time  Discharge Recommendations: Recommend post-acute placement for additional occupational therapy services prior to discharge home    Objective       06/04/21 1116   Strength Upper Body   Upper Body Strength  X   Comments decreased L hand  strength   Hand Strengthening   Hand Strengthening Gross Grasp Left  (using yellow foam block)   Comment 10x2 rep   Other Treatments   Other Treatments Provided Worked on multiple t/f techniques, attempted STS x2 , and trained on slide board t/f'; significant time required to complete task. Cues required for breathing techniques and for pacing appropriately   Balance   Sitting Balance (Static) Fair   Sitting Balance (Dynamic) Fair   Standing Balance (Static) Poor +   Weight Shift Sitting Fair   Weight Shift Standing Poor   Skilled Intervention Verbal Cuing;Tactile Cuing;Sequencing;Compensatory Strategies;Postural Facilitation   Comments w/FWW, unable to come fully upright without max a, decreased L hand  strength, handles of FWW built-up  with coban.    Activities of Daily Living   Eating Supervision  (after s/u)   Grooming Supervision;Seated   Upper Body Dressing Minimal Assist   Lower Body Dressing Moderate Assist  (donned R prosthetic with min<>mod a, donned shrinkers w/ min)   Toileting   (palacios in place, declined need for BM)   Skilled Intervention Verbal Cuing;Tactile Cuing;Sequencing;Compensatory Strategies   Comments limited by L hand  strength, worked on modifications   Functional Mobility   Sit to Stand Maximal Assist   Bed, Chair, Wheelchair Transfer Moderate Assist  (using slide board)   Toilet Transfers Unable to Participate   Transfer Method Slide Board   Mobility bed mobility, STS eob x 2 trials with max a, eob->recliner with sb   Skilled Intervention Verbal Cuing;Tactile Cuing;Sequencing;Postural Facilitation;Compensatory Strategies   Comments required physical assist to come upright and sustain standing for 10sec with total assist, initiated slide board t/f traning   Short Term Goals   Short Term Goal # 1 SBA slide board transfer bed to commode/ wheelchair   Goal Outcome # 1 Progressing as expected   Short Term Goal # 2 seated UB ADL with set up   Goal Outcome # 2 Progressing as expected   Short Term Goal # 3 tolerate 15 minutes of continuous activity, prior to resting, wihtout HORTON   Goal Outcome # 3 Progressing as expected   Short Term Goal # 4 tolerate STS using right prosthetic, wiht SBA   Goal Outcome # 4 Progressing slower than expected   Anticipated Discharge Equipment and Recommendations   DC Equipment Recommendations Unable to determine at this time

## 2021-06-04 NOTE — THERAPY
"Physical Therapy   Daily Treatment     Patient Name: Luis Sepulveda  Age:  75 y.o., Sex:  male  Medical Record #: [id number]  Today's Date: 6/4/2021     Precautions: Fall Risk    Assessment    Pt continues to present motivated to participate. Pt was able to assist more w/ donning his prosthetic but continues to be limited by L hand weakness and pain. Pt needing MaxA to get to standing and had poor tolerance today d/t pain in his L hand. Pt educated on SB transfer. He needed MaxA to perform transfer w/ manual facilitation to anteriorly shift and move head towards the L to assist w/ sliding hips to the R. Pt w/ increased WOB during transfer d/t effort. Pt needing 5 minutes after transfer to return to normal breathing even though O2 sats were 92.    Plan    Continue current treatment plan.    DC Equipment Recommendations: Unable to determine at this time  Discharge Recommendations: Recommend post-acute placement for additional physical therapy services prior to discharge home      Subjective    \"My ex-wife and I got re- 4 days ago.\"     Objective       06/04/21 1050   Precautions   Precautions Fall Risk   Comments New L BKA, prior R BKA w/ prosthetic   Gait Analysis   Gait Level Of Assist Unable to Participate   Bed Mobility    Supine to Sit Supervised   Sit to Supine   (NT up in chair)   Scooting Minimal Assist   Rolling   (sit pivot)   Comments Pt requiring a lot of time and effort to perform scooting.   Functional Mobility   Sit to Stand Maximal Assist   Bed, Chair, Wheelchair Transfer Maximal Assist   Transfer Method Slide Board   Mobility Seated SB to chair. Pt needing a lot of verbal and manual cues for anterior weight shift. R prosthetic blocked from sliding forward. A lot of time to perform w/ pt taking very small slides.   Short Term Goals    Short Term Goal # 1 Pt will perform bed mob at \[facility]\"" in 6tx to improve functional independence.    Goal Outcome # 1 Goal met   Short Term Goal # 2 Pt will " perform SB transfer at min A in 6tx to imrpove functional mobility.    Goal Outcome # 2 Goal not met   Short Term Goal # 3 Pt will perform transfer with R LE prosthesis and FWW at min A in 6tx to improve functional mobility.    Goal Outcome # 3 Goal not met

## 2021-06-04 NOTE — PROGRESS NOTES
[facility] Medicine Daily Progress Note    Date of Service  6/4/2021    Chief Complaint  75 y.o. male admitted 5/20/2021 with weakness and dizziness    Hospital Course  75-year-old male with a past medical history of end-stage renal disease on dialysis, peripheral vascular disease status post right below the knee amputation, insulin-dependent diabetes mellitus, there was most recently admitted here from 5/10/2021 through 5/18/2021 when he underwent heart catheterization revealing ejection fraction of 15 to 20% with 80% ostial LAD in-stent restenosis and distal RCA disease was admitted on 5/20/2021 for cellulitis of the left fourth toe foot secondary to ischemic.    Patient was evaluated by vascular surgery which determined that patient was not a candidate for revascularization procedures.  Infectious disease following for which she was started on a short course of meropenem.  He underwent left BKA on 5/28/2021 which she tolerated well.  IV antibiotics discontinued by infectious disease shortly after.  Throughout hospital course, nephrology having difficulty with dialysis secondary to poor AV fistula flow.  Patient underwent arterial AV fistula Doppler which showed fistula is patent.  Nephrology was able to use AV fistula successfully during hemodialysis.  Current recommendations to follow-up in outpatient setting.    Interval Problem Update    6/1/21: The patient was seen and evaluated at bedside and appears comfortable.  Patient states that his pain is currently well controlled after undergoing left BKA.  He is tolerating oral intake and denies any chest pains, palpitations, or shortness of breath.  Of note, patient still requiring 3 to 4 L via nasal cannula.  He states he has a history of COPD we are pending respiratory therapist evaluation.  He denies any fever/chills or productive cough.  No other overnight events reported.      6/2/2021: The patient seen and evaluated at bedside and appears comfortable however he  still has persistent supplemental oxygen needs.  Repeat chest x-ray shows worsening right-sided pleural effusion as well as interstitial edema.  This is likely multifactorial in the setting of noncompliance with hemodialysis as well as history of congestive heart failure reduced ejection fraction.  He will continue with dialysis sessions and we will begin IV diuresis as tolerated.  Pending procalcitonin to rule out infectious cause.  At this time patient denies any productive cough, no chest pains, palpitations, or nausea/vomiting.  No other overnight events reported by nursing staff.    6/3/2021: The patient was seen and evaluated bedside and states that his breathing has somewhat improved with nebulization therapy as well as IV diuretics.  There was initial concern for infection however this has been ruled out with improving white count and absence of fever/productive cough.  Patient will need to continue dialysis to improve volume status.  He is tolerating oral intake and states pain is well controlled.  We are pending placement to SNF for continued physical therapy.  No other overnight events reported.    6/4/2021: The patient was seen and evaluated bedside and states that his breathing has improved overnight.  Trial of Lasix has been unsuccessful and had since been discontinued.  Patient will need continued dialysis and supplemental oxygen support.  He states that he is tolerating oral intake and able to tolerate being out of bed in chair.  Pending placement to SNF for continued physical therapy.  No other overnight events reported.        Disposition  Pending SNF     Review of Systems  Review of Systems   Constitutional: Negative for chills and fever.   HENT: Negative for hearing loss and tinnitus.    Eyes: Negative for blurred vision, double vision and photophobia.   Respiratory: Negative for cough and shortness of breath.    Cardiovascular: Negative for chest pain and palpitations.   Gastrointestinal:  Negative for nausea and vomiting. Diarrhea: loose Stools.   Genitourinary: Negative for dysuria and urgency.   Musculoskeletal: Negative for back pain and myalgias.   Skin: Negative for itching and rash.   Neurological: Negative for dizziness and headaches.   Endo/Heme/Allergies: Does not bruise/bleed easily.   Psychiatric/Behavioral: Negative for depression and suicidal ideas.        Physical Exam  Temp:  [36.2 °C (97.1 °F)-36.6 °C (97.9 °F)] 36.4 °C (97.5 °F)  Pulse:  [] 100  Resp:  [17-18] 18  BP: (104-158)/(40-56) 124/56  SpO2:  [91 %-99 %] 97 %    Physical Exam  Vitals reviewed.   Constitutional:       General: He is not in acute distress.     Appearance: Normal appearance. He is well-developed. He is obese. He is not diaphoretic.   HENT:      Head: Normocephalic and atraumatic.      Right Ear: External ear normal.      Left Ear: External ear normal.      Nose: No congestion or rhinorrhea.      Mouth/Throat:      Mouth: Mucous membranes are moist.      Pharynx: Oropharynx is clear. No oropharyngeal exudate or posterior oropharyngeal erythema.   Eyes:      General: No scleral icterus.     Extraocular Movements: Extraocular movements intact.      Conjunctiva/sclera: Conjunctivae normal.      Pupils: Pupils are equal, round, and reactive to light.   Cardiovascular:      Rate and Rhythm: Normal rate and regular rhythm.      Pulses: Normal pulses.      Heart sounds: Normal heart sounds.   Pulmonary:      Effort: Pulmonary effort is normal.      Breath sounds: Normal breath sounds.      Comments: occasional wheezing   Abdominal:      General: Abdomen is flat. Bowel sounds are normal.      Palpations: Abdomen is soft.   Musculoskeletal:         General: No swelling or tenderness. Normal range of motion.      Cervical back: Normal range of motion and neck supple. No muscular tenderness.      Comments: Right BKA  Left BKA with tender surgical site   Skin:     General: Skin is warm and dry.      Findings: No  erythema or rash.   Neurological:      General: No focal deficit present.      Mental Status: He is alert and oriented to person, place, and time. Mental status is at baseline.      Cranial Nerves: No cranial nerve deficit.      Sensory: No sensory deficit.   Psychiatric:         Mood and Affect: Mood normal.         Behavior: Behavior normal.         Thought Content: Thought content normal.         Fluids    Intake/Output Summary (Last 24 hours) at 6/4/2021 1015  Last data filed at 6/3/2021 2030  Gross per 24 hour   Intake 500 ml   Output 2000 ml   Net -1500 ml       Laboratory  Recent Labs     06/02/21  0515 06/03/21  0415 06/04/21  0601   WBC 12.6* 11.0* 10.0   RBC 2.43* 2.50* 2.50*   HEMOGLOBIN 8.0* 8.2* 8.3*   HEMATOCRIT 24.9* 26.3* 26.3*   .5* 105.2* 105.2*   MCH 32.9 32.8 33.2*   MCHC 32.1* 31.2* 31.6*   RDW 61.2* 63.7* 62.9*   PLATELETCT 343 443 373   MPV 8.9* 9.3 8.8*     Recent Labs     06/02/21  0515 06/03/21  0415 06/04/21  0601   SODIUM 132* 131* 136   POTASSIUM 4.3 4.8 4.2   CHLORIDE 95* 93* 97   CO2 25 26 29   GLUCOSE 94 101* 105*   BUN 19 32* 23*   CREATININE 4.19* 5.46* 4.14*   CALCIUM 8.7 8.9 9.0                   Imaging  IR-US GUIDED PIV   Final Result    Ultrasound-guided PERIPHERAL IV INSERTION performed by    qualified nursing staff as above.      DX-CHEST-LIMITED (1 VIEW)   Final Result      1.  Moderate right-sided pleural effusion, increased from previous exam.   2.  Slightly worsened diffuse interstitial edema.      DX-CHEST-LIMITED (1 VIEW)   Final Result      1.  Cardiomegaly is pulmonary vascular congestion and interstitial edema.   2.  Layering right pleural effusion with overlying atelectasis/consolidation.   3.  Trace pleural fluid versus pleural thickening at the left lung base.   4.  Left lung base calcification is again seen which may related to prior infection or trauma.         US-HEMODIALYSIS GRAFT DUPLEX COMP UPPER EXTREMITY   Final Result      US-EXTREMITY ARTERY LOWER  BILAT   Final Result      US-PATSY SINGLE LEVEL BILAT   Final Result      DX-FOOT-2- LEFT   Final Result      1.  Soft tissue swelling of LEFT 4th toe.   2.  No gross bony destruction however osteomyelitis is not excluded by plain film.   3.  Prior partial amputation of great toe.      DX-CHEST-PORTABLE (1 VIEW)   Final Result      Left lower lobe pleural thickening and calcification may be related to sequelae of prior infection or trauma.      Patchy left basilar opacity may represent atelectasis or pneumonitis.      Mild cardiomegaly.      Atherosclerotic plaque.              Assessment/Plan  * Cellulitis of left foot- (present on admission)  Assessment & Plan  One dose of vancomycin given in the ER  On 5/22, foot wound culture grew ESBL  Wound culture growing Klebsiella pneumonia ESBL, but use me diabetes, and Staph aureus   ID with final recommendation is to discontinue IV antibiotics as patient is achieved effective source control with below-knee amputation.  Resolved    CHF (congestive heart failure) ([facility])  Assessment & Plan  Patient with history of CHFrEF of 30%  Persistent supplemental oxygen needs and worsening interstitial edema noted on chest imaging  Lasix ineffective in setting of ESRD  Will need to continue dialysis     S/P BKA (below knee amputation) unilateral, left (HCC)  Assessment & Plan  Status post left BKA on 5/28/2023 1  Scheduled and as needed pain mgmt  Orthopedic surgery following for post BKA care  Labs on AM    DNR (do not resuscitate)- (present on admission)  Assessment & Plan  Discussed with Mr. Sepulveda and he confirmed this status.    Ischemia of toe- (present on admission)  Assessment & Plan  S/P left BKA on 5/28  Resolved    Advance care planning- (present on admission)  Assessment & Plan  In a previous visit, he had indicated to palliative that he would consider hospice/comfort care  Currently, vascular recommend surgery and patient is agreeable  Consulted palliative care for further  goals of care discussion after surgery    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)- (present on admission)  Assessment & Plan  Right BKA and left great toe amputation     Acute respiratory failure with hypoxia ([facility])  Assessment & Plan  Requiring 4 L nasal cannula with a saturation  Secondary to fluid overload from not completing dialysis  Nephrology following, patient for no trial of hemodialysis today  Patient also states he has a remote history of COPD, pending evaluation by respiratory therapy    Cardiomyopathy, ischemic- (present on admission)  Assessment & Plan  EF from heart cath 5/10 15-20%    Peripheral vascular disease (HCC)- (present on admission)  Assessment & Plan  Hx of multiple amputations     Hypotension- (present on admission)  Assessment & Plan  Chronic condition  Discontinue coreg  Continue home midodrine  Resolved    Paroxysmal A-fib ([facility])- (present on admission)  Assessment & Plan  Chronic condition  Patient is tolerating eliquis   Rate controlled     End stage renal disease on dialysis ([facility])- (present on admission)  Assessment & Plan  Dialysis via left arm fistula T,Th,S  Dr. Hodges, nephrology was consulted from the ER    Type 2 diabetes mellitus with kidney complication, with long-term current use of insulin ([facility])- (present on admission)  Assessment & Plan  With hyperglycemia  Continue sliding scale  Diabetic diet    Macrocytic anemia- (present on admission)  Assessment & Plan  Hemoglobin 6.5 today  No signs of GI bleed  Vit B12/folate unremarkable  Secondary to anemia of chronic kidney disease  Transfuse 1 unit of PRBC  Nephrology following, recommending iron repletion  Continue erythropoietin as per nephrology    CAD (coronary artery disease)- (present on admission)  Assessment & Plan  With hx of stents [facility]  No active chest pain at this time  Unable to initiate beta blocker or ACEI due to episodes of hypotension     Hyponatremia- (present on  admission)  Assessment & Plan  Mild, continue to monitor    Pulmonary edema  Assessment & Plan  Noted on 5/30/2021 chest x-ray  Complicated by acute hypoxic respiratory failure requiring 4 L nasal cannula  Likely secondary to fluid overload from difficulties accessing IV fistula/poor flow  Nephrology following, patient for another attempt of hemodialysis today  Labs on a.m.       VTE prophylaxis: apixaban

## 2021-06-04 NOTE — PROGRESS NOTES
[facility] Nephrology Daily Progress Note    Date of Service  6/4/2021    Author: TABATHA Becker    Chief Complaint  74 y/o man with ESRD HD T,TH,Sat at [place] presented with hypotension and inability to HD. On routine labs noted to have hyperkalemia.  Pt has increased erythema of 4th toe on left with some spreading erythema. Pt reports that this is worsening over the last few weeks, but though it secondary to trauma.     No F/C/N/V/CP/SOB.  No melena, hematochezia, hematemesis.  No HA, visual changes, or abdominal pain.    Daily Nephrology Summary:   5/20 - Consult done  5/21 - sitting up in bed, feeling good, appetite good, mild non-productive cough, tolerated HD yesterday UF: 158ml  5/22 - seen on HD, some hypotension this am, limited UF with HD, vascular to consult   5/23 - sitting up in bed, c/o severe pain in foot and L hand, vascular saw pt and plan poss intervention   5/24 - NAEO, feels good, having issues with dropping objects recently over the past 3-4 days  5/25 - NAEO, no complaints, sleeping comfortably  5/26 - NAEO, no complaints  5/27 - NAEO, no complaints, feels good  5/28 - NAEO, no complaints, scheduled for BKA today, family at bedside  5/29 - NAEO, no complaints today.  Per dialysis nurse clots on arterial line when initiating dialysis.  High arterial pressures.  5/30 - HD stopped early yesterday due to clotting, AVG U/S with patency  5/31 - NAEO, no complaints, feels good  6/01 - NAEO, no complaints, feels good, cannulation with 15G needles today  6/02 - NAEO, no complaints  6/03 - NAEO, no complaints, resting comfortably  6/04 - No overnight events or complaints.     Review of Systems  Review of Systems   Constitutional: Negative for chills and fever.   Respiratory: Negative for cough and shortness of breath.    Cardiovascular: Negative for chest pain and leg swelling.   Gastrointestinal: Negative for nausea and vomiting.   Neurological: Negative for seizures and loss of  consciousness.   All other systems reviewed and are negative.    Physical Exam  Temp:  [36.2 °C (97.1 °F)-36.6 °C (97.9 °F)] 36.4 °C (97.5 °F)  Pulse:  [] 100  Resp:  [17-18] 18  BP: (104-158)/(40-56) 124/56  SpO2:  [91 %-99 %] 97 %    Physical Exam  Vitals and nursing note reviewed.   Constitutional:       Appearance: Normal appearance.   HENT:      Head: Normocephalic and atraumatic.   Eyes:      General: No scleral icterus.        Right eye: No discharge.         Left eye: No discharge.   Cardiovascular:      Rate and Rhythm: Normal rate and regular rhythm.   Pulmonary:      Effort: Pulmonary effort is normal.      Breath sounds: Normal breath sounds.   Abdominal:      General: Bowel sounds are normal.      Palpations: Abdomen is soft.   Musculoskeletal:         General: Deformity (Left foot amputation) present. No tenderness.      Cervical back: Neck supple. No muscular tenderness.      Right lower leg: No edema.      Left lower leg: No edema.   Neurological:      Mental Status: He is alert. Mental status is at baseline.     Fluids    Intake/Output Summary (Last 24 hours) at 6/4/2021 1144  Last data filed at 6/4/2021 1000  Gross per 24 hour   Intake 860 ml   Output 2000 ml   Net -1140 ml     Laboratory  Recent Labs     06/02/21  0515 06/03/21  0415 06/04/21  0601   WBC 12.6* 11.0* 10.0   RBC 2.43* 2.50* 2.50*   HEMOGLOBIN 8.0* 8.2* 8.3*   HEMATOCRIT 24.9* 26.3* 26.3*   .5* 105.2* 105.2*   MCH 32.9 32.8 33.2*   MCHC 32.1* 31.2* 31.6*   RDW 61.2* 63.7* 62.9*   PLATELETCT 343 443 373   MPV 8.9* 9.3 8.8*     Recent Labs     06/02/21  0515 06/03/21  0415 06/04/21  0601   SODIUM 132* 131* 136   POTASSIUM 4.3 4.8 4.2   CHLORIDE 95* 93* 97   CO2 25 26 29   GLUCOSE 94 101* 105*   BUN 19 32* 23*   CREATININE 4.19* 5.46* 4.14*   CALCIUM 8.7 8.9 9.0         Recent Labs     06/02/21  1427   NTPROBNP >73156*           Imaging  - Reviewed    Assessment  # ESRD   maint HD q  T/T/S via   AVG. Left  arm (+thrill/bruit)   U/S was patent   AVG cannulated without issue today, seems to be dependent on BP, when good easy to cannulate  # Hypotension: cardiogenic vs toe/cellultis   midodrine  # PVD with left 4th great toe discoloration   Per vascular    Non-healing and scheduled for BKA today (5/28)  # Cardiomyopathy: acute worsening. EF now 15-20%  # 80% ostial LAD in-stent restenosis:   # Steal syndrome: s/p DRI. Worsening apparent ischmia in setting of heart failure  # Anemia in CKD   Iron sat okay / ferritin elevated   ARMANDO   # Atrial fibrillation   plavix    BB  # Diabetes mellitus  # Hyperthyroidism   Tapazole  # Hyponatremia, resolved    UF with HD   # Hyperkalemia, corrected    Manage with HD   # Hyperphosphatemia   Controlled    sevelamer TID with meals   # BMD-CKD   Calcitriol   Sevelamer     PLAN:  - TTS iHD  - UF with HD as tolerated   - No need for vascular eval at this time, will refer to AC as OP, unless further issues develop  - ARMANDO increased 6/4  - Continue binders  - Dose all meds per ESRD

## 2021-06-04 NOTE — CARE PLAN
Problem: Pain - Standard  Goal: Alleviation of pain or a reduction in pain to the patient’s comfort goal  Outcome: Progressing     Problem: Skin Integrity  Goal: Skin integrity is maintained or improved  Outcome: Progressing     The patient is Watcher - Medium risk of patient condition declining or worsening    Shift Goals  Clinical Goals: wean O2, free of falls    Progress made toward(s) clinical / shift goals:  pt remained free of falls. Unable to wean from 4LNC. Pt continues to do IS, requires encouragement, pulling 750ml. Turns q2H, deep breathing and coughing. Self oral suction as needed.     Patient is not progressing towards the following goals:    Problem: Respiratory  Goal: Patient will achieve/maintain optimum respiratory ventilation and gas exchange  Outcome: Not Progressing

## 2021-06-04 NOTE — DISCHARGE PLANNING
Agency/Facility Name: [facility]   Spoke To: Alanna   Outcome: Per Alanna referral currently pending due to still being review.

## 2021-06-04 NOTE — PROGRESS NOTES
Hemodialysis ordered by Dr. Mahajan. Treatment started at 1154 and ended at 1454. Pt stable, vss, no c/o post tx. See flow sheets for details. Net UF 1.5 L. Reported to KENNY Kapadia RN. Lt ua avf dressing clean, dry, intact.

## 2021-06-04 NOTE — PROGRESS NOTES
2 RN Skin Check    2 RN skin check complete with Viry RN.     Devices in place: silicone nasal cannula, continuous pulse oximeter finger probe, right upper arm PIV, indwelling palacios catheter  Skin assessed under devices: yes    Confirmed pressure ulcers found on: sacrum tear, no drainage noted. Cleansed with wound cleanser, dried, and sacral mepilex changed.  New potential pressure ulcers noted on: N/A.   Wound consult placed: already placed    The following interventions in place gray foam to silicone nasal cannula, skin checks with frequent turns q2H, low air loss mattress with TAPS and wedges for turns, mepilex to sacrum.    Skin assessment: Healed puncture site ALHAJI to right upper chest. Bilateral BKA. Ace wrap and dressing to left stump. Bilateral upper extremity bruising and redness. Bruising to bilateral lower abdomen.  Left upper arm AV fistula. Swelling to RLQ abdomen and right inguinal area. Bony prominences and behind bilateral ears assessed, CDI, blanching

## 2021-06-04 NOTE — CARE PLAN
Problem: Pain - Standard  Goal: Alleviation of pain or a reduction in pain to the patient’s comfort goal  Outcome: Progressing     Problem: Skin Integrity  Goal: Skin integrity is maintained or improved  Outcome: Progressing     Problem: Knowledge Deficit - Diabetes  Goal: Patient will demonstrate knowledge of insulin injection, symptoms, and treatment of hypoglycemia and diet prior to discharge  Outcome: Progressing     Problem: Respiratory  Goal: Patient will achieve/maintain optimum respiratory ventilation and gas exchange  Outcome: Progressing   The patient is Watcher - Medium risk of patient condition declining or worsening    Shift Goals  Clinical Goals: wean O2, free of falls  Patient Goals: get dialysis    Progress made toward(s) clinical / shift goals:  Patient educated on pain scale. Patient educated on medications available and to notify RN if pain is uncontrolled. Patient verbalizes understanding.    Patient is able to turn side to side but needs to be reminded. Patient educated on importance to mobilize. Patient verbalizes understanding. Qshift skin checks in place.    Patient is not progressing towards the following goals:

## 2021-06-05 NOTE — PROGRESS NOTES
2 RN Skin Check     2 RN skin check completed with JAN Kathleen.  Devices in place: Nasal Cannula.  Skin assessed under devices: yes.  Confirmed pressure ulcers found on: sacrum  New potential pressure ulcers noted on N/A.  Wound consult placed NA  The following interventions in place Mepilex, Q2 turns in place, patient on low air loss mattress, qshift skin checks.     Skin: Patient has dressing in place for LLE. Dressing is clean, dry and intact.  Bruising and scattered abrasions noted on bilateral upper extremities.  Patient has sacrum pressure sore. Mepilex changed.

## 2021-06-05 NOTE — PROGRESS NOTES
[facility] Medicine Daily Progress Note    Date of Service  6/5/2021    Chief Complaint  75 y.o. male admitted 5/20/2021 with weakness and dizziness    Hospital Course  75-year-old male with a past medical history of end-stage renal disease on dialysis, peripheral vascular disease status post right below the knee amputation, insulin-dependent diabetes mellitus, there was most recently admitted here from 5/10/2021 through 5/18/2021 when he underwent heart catheterization revealing ejection fraction of 15 to 20% with 80% ostial LAD in-stent restenosis and distal RCA disease was admitted on 5/20/2021 for cellulitis of the left fourth toe foot secondary to ischemic.    Patient was evaluated by vascular surgery which determined that patient was not a candidate for revascularization procedures.  Infectious disease following for which she was started on a short course of meropenem.  He underwent left BKA on 5/28/2021 which she tolerated well.  IV antibiotics discontinued by infectious disease shortly after.  Throughout hospital course, nephrology having difficulty with dialysis secondary to poor AV fistula flow.  Patient underwent arterial AV fistula Doppler which showed fistula is patent.  Nephrology was able to use AV fistula successfully during hemodialysis.  Current recommendations to follow-up in outpatient setting.    Interval Problem Update    6/1/21: The patient was seen and evaluated at bedside and appears comfortable.  Patient states that his pain is currently well controlled after undergoing left BKA.  He is tolerating oral intake and denies any chest pains, palpitations, or shortness of breath.  Of note, patient still requiring 3 to 4 L via nasal cannula.  He states he has a history of COPD we are pending respiratory therapist evaluation.  He denies any fever/chills or productive cough.  No other overnight events reported.      6/2/2021: The patient seen and evaluated at bedside and appears comfortable however he  still has persistent supplemental oxygen needs.  Repeat chest x-ray shows worsening right-sided pleural effusion as well as interstitial edema.  This is likely multifactorial in the setting of noncompliance with hemodialysis as well as history of congestive heart failure reduced ejection fraction.  He will continue with dialysis sessions and we will begin IV diuresis as tolerated.  Pending procalcitonin to rule out infectious cause.  At this time patient denies any productive cough, no chest pains, palpitations, or nausea/vomiting.  No other overnight events reported by nursing staff.    6/3/2021: The patient was seen and evaluated bedside and states that his breathing has somewhat improved with nebulization therapy as well as IV diuretics.  There was initial concern for infection however this has been ruled out with improving white count and absence of fever/productive cough.  Patient will need to continue dialysis to improve volume status.  He is tolerating oral intake and states pain is well controlled.  We are pending placement to SNF for continued physical therapy.  No other overnight events reported.    6/4/2021: The patient was seen and evaluated bedside and states that his breathing has improved overnight.  Trial of Lasix has been unsuccessful and had since been discontinued.  Patient will need continued dialysis and supplemental oxygen support.  He states that he is tolerating oral intake and able to tolerate being out of bed in chair.  Pending placement to SNF for continued physical therapy.  No other overnight events reported.    6/5/2021: The patient was seen and evaluated at bedside where a rapid response was called.  According to nursing staff patient was noted to be in respiratory distress presenting as shortness of breath and difficulty completing sentences.  Patient did not have any episodes of desaturation.  Troponins were drawn and are actually better than his baseline troponin levels in the past  which are expected to be elevated in the setting of end-stage renal disease.  Patient notes chest pressure with shortness of breath likely pleuritic chest pain.  EKG reviewed without any significant changes compared to baseline.  Patient will continue with respiratory therapy.  Dialysis has been alerted of the patient's worsening shortness of breath and was encouraged to remove more fluid during dialysis session today.  Repeat chest x-ray does not show interval worsening of right-sided pleural effusion.  If with persistent shortness of breath we may consider thoracentesis.        Disposition  Pending [facility]     Review of Systems  Review of Systems   Constitutional: Negative for chills and fever.   HENT: Negative for hearing loss and tinnitus.    Eyes: Negative for blurred vision, double vision and photophobia.   Respiratory: Positive for shortness of breath. Negative for cough.    Cardiovascular: Negative for chest pain and palpitations.   Gastrointestinal: Negative for nausea and vomiting. Diarrhea: loose Stools.   Genitourinary: Negative for dysuria and urgency.   Musculoskeletal: Negative for back pain and myalgias.   Skin: Negative for itching and rash.   Neurological: Negative for dizziness and headaches.   Endo/Heme/Allergies: Does not bruise/bleed easily.   Psychiatric/Behavioral: Negative for depression and suicidal ideas.        Physical Exam  Temp:  [36.2 °C (97.1 °F)-36.6 °C (97.9 °F)] 36.4 °C (97.5 °F)  Pulse:  [72-94] 94  Resp:  [16-24] 22  BP: (104-160)/(33-80) 160/80  SpO2:  [91 %-96 %] 95 %    Physical Exam  Vitals reviewed.   Constitutional:       General: He is not in acute distress.     Appearance: Normal appearance. He is well-developed. He is obese. He is not diaphoretic.   HENT:      Head: Normocephalic and atraumatic.      Right Ear: External ear normal.      Left Ear: External ear normal.      Nose: No congestion or rhinorrhea.      Mouth/Throat:      Mouth: Mucous membranes are moist.       Pharynx: Oropharynx is clear. No oropharyngeal exudate or posterior oropharyngeal erythema.   Eyes:      General: No scleral icterus.     Extraocular Movements: Extraocular movements intact.      Conjunctiva/sclera: Conjunctivae normal.      Pupils: Pupils are equal, round, and reactive to light.   Cardiovascular:      Rate and Rhythm: Normal rate and regular rhythm.      Pulses: Normal pulses.      Heart sounds: Normal heart sounds.   Pulmonary:      Effort: Pulmonary effort is normal.      Breath sounds: Normal breath sounds.      Comments: Decreased breath sounds at bases  Abdominal:      General: Abdomen is flat. Bowel sounds are normal.      Palpations: Abdomen is soft.   Musculoskeletal:         General: No swelling or tenderness. Normal range of motion.      Cervical back: Normal range of motion and neck supple. No muscular tenderness.      Comments: Right BKA  Left BKA with tender surgical site   Skin:     General: Skin is warm and dry.      Findings: No erythema or rash.   Neurological:      General: No focal deficit present.      Mental Status: He is alert and oriented to person, place, and time. Mental status is at baseline.      Cranial Nerves: No cranial nerve deficit.      Sensory: No sensory deficit.   Psychiatric:         Mood and Affect: Mood normal.         Behavior: Behavior normal.         Thought Content: Thought content normal.         Fluids    Intake/Output Summary (Last 24 hours) at 6/5/2021 1142  Last data filed at 6/5/2021 0318  Gross per 24 hour   Intake 740 ml   Output 700 ml   Net 40 ml       Laboratory  Recent Labs     06/03/21 0415 06/04/21  0601 06/05/21  0754   WBC 11.0* 10.0 9.2   RBC 2.50* 2.50* 2.43*   HEMOGLOBIN 8.2* 8.3* 8.1*   HEMATOCRIT 26.3* 26.3* 25.2*   .2* 105.2* 103.7*   MCH 32.8 33.2* 33.3*   MCHC 31.2* 31.6* 32.1*   RDW 63.7* 62.9* 62.4*   PLATELETCT 443 373 373   MPV 9.3 8.8* 8.7*     Recent Labs     06/03/21 0415 06/04/21  0601 06/05/21  0754   SODIUM 131*  136 134*   POTASSIUM 4.8 4.2 4.5   CHLORIDE 93* 97 96   CO2 26 29 26   GLUCOSE 101* 105* 108*   BUN 32* 23* 33*   CREATININE 5.46* 4.14* 5.56*   CALCIUM 8.9 9.0 9.2                   Imaging  DX-CHEST-PORTABLE (1 VIEW)   Final Result      1.  Enlarged chronic cardiac silhouette with changes most consistent with interstitial edema/CHF.      IR-US GUIDED PIV   Final Result    Ultrasound-guided PERIPHERAL IV INSERTION performed by    qualified nursing staff as above.      DX-CHEST-LIMITED (1 VIEW)   Final Result      1.  Moderate right-sided pleural effusion, increased from previous exam.   2.  Slightly worsened diffuse interstitial edema.      DX-CHEST-LIMITED (1 VIEW)   Final Result      1.  Cardiomegaly is pulmonary vascular congestion and interstitial edema.   2.  Layering right pleural effusion with overlying atelectasis/consolidation.   3.  Trace pleural fluid versus pleural thickening at the left lung base.   4.  Left lung base calcification is again seen which may related to prior infection or trauma.         US-HEMODIALYSIS GRAFT DUPLEX COMP UPPER EXTREMITY   Final Result      US-EXTREMITY ARTERY LOWER BILAT   Final Result      US-PATSY SINGLE LEVEL BILAT   Final Result      DX-FOOT-2- LEFT   Final Result      1.  Soft tissue swelling of LEFT 4th toe.   2.  No gross bony destruction however osteomyelitis is not excluded by plain film.   3.  Prior partial amputation of great toe.      DX-CHEST-PORTABLE (1 VIEW)   Final Result      Left lower lobe pleural thickening and calcification may be related to sequelae of prior infection or trauma.      Patchy left basilar opacity may represent atelectasis or pneumonitis.      Mild cardiomegaly.      Atherosclerotic plaque.              Assessment/Plan  * Cellulitis of left foot- (present on admission)  Assessment & Plan  One dose of vancomycin given in the ER  On 5/22, foot wound culture grew ESBL  Wound culture growing Klebsiella pneumonia ESBL, but use me diabetes, and  Staph aureus   ID with final recommendation is to discontinue IV antibiotics as patient is achieved effective source control with below-knee amputation.  Resolved    CHF (congestive heart failure) (HCC)  Assessment & Plan  Patient with history of CHFrEF of 30%  Persistent supplemental oxygen needs and worsening interstitial edema noted on chest imaging  Lasix ineffective in setting of ESRD  Will need to continue dialysis     S/P BKA (below knee amputation) unilateral, left (HCC)  Assessment & Plan  Status post left BKA on 5/28/2023 1  Scheduled and as needed pain mgmt  Orthopedic surgery following for post BKA care  Labs on AM    DNR (do not resuscitate)- (present on admission)  Assessment & Plan  Discussed with Mr. Sepulveda and he confirmed this status.    Ischemia of toe- (present on admission)  Assessment & Plan  S/P left BKA on 5/28  Resolved    Advance care planning- (present on admission)  Assessment & Plan  In a previous visit, he had indicated to palliative that he would consider hospice/comfort care  Currently, vascular recommend surgery and patient is agreeable  Consulted palliative care for further goals of care discussion after surgery    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)- (present on admission)  Assessment & Plan  Right BKA and left great toe amputation     Acute respiratory failure with hypoxia (HCC)  Assessment & Plan  Requiring 4 L nasal cannula with a saturation  Secondary to fluid overload from not completing dialysis  Nephrology following, patient for no trial of hemodialysis today  Patient also states he has a remote history of COPD, pending evaluation by respiratory therapy    Cardiomyopathy, ischemic- (present on admission)  Assessment & Plan  EF from heart cath 5/10 15-20%    Peripheral vascular disease (HCC)- (present on admission)  Assessment & Plan  Hx of multiple amputations     Hypotension- (present on admission)  Assessment & Plan  Chronic  condition  Discontinue coreg  Continue home midodrine  Resolved    Paroxysmal A-fib (HCC)- (present on admission)  Assessment & Plan  Chronic condition  Patient is tolerating eliquis   Rate controlled     End stage renal disease on dialysis (HCC)- (present on admission)  Assessment & Plan  Dialysis via left arm fistula T,Th,S  Dr. Hodges, nephrology was consulted from the ER    Type 2 diabetes mellitus with kidney complication, with long-term current use of insulin (HCC)- (present on admission)  Assessment & Plan  With hyperglycemia  Continue sliding scale  Diabetic diet    Macrocytic anemia- (present on admission)  Assessment & Plan  Hemoglobin 6.5 today  No signs of GI bleed  Vit B12/folate unremarkable  Secondary to anemia of chronic kidney disease  Transfuse 1 unit of PRBC  Nephrology following, recommending iron repletion  Continue erythropoietin as per nephrology    CAD (coronary artery disease)- (present on admission)  Assessment & Plan  [facility]  No active chest pain at this time  Unable to initiate beta blocker or ACEI due to episodes of hypotension     Hyponatremia- (present on admission)  Assessment & Plan  Mild, continue to monitor    Pulmonary edema  Assessment & Plan  Noted on 5/30/2021 chest x-ray  Complicated by acute hypoxic respiratory failure requiring 4 L nasal cannula  Likely secondary to fluid overload from difficulties accessing IV fistula/poor flow  Nephrology following, patient for another attempt of hemodialysis today  Labs on a.m.       VTE prophylaxis: apixaban

## 2021-06-05 NOTE — PROGRESS NOTES
4 Eyes Skin Assessment Completed by JAN Hickey and JAN Gambino.    Head WDL  Ears Redness and Blanching  Nose WDL  Mouth WDL  Neck WDL  Breast/Chest WDL  Shoulder Blades WDL  Spine WDL  (R) Arm/Elbow/Hand Bruising, 20G IV in upper arm, CDI  (L) Arm/Elbow/Hand Bruising   Abdomen Bruising  Groin WDL  Scrotum/Coccyx/Buttocks Redness, Blanching and Discoloration Documented as DTI, but now a small skin tear/scab is detatched, surface skin is discolored but underneath skin looks pink. Slow to bell, some maceration possibly moisture-associated  (R) Leg WDL (BKA)  (L) Leg WDL (BKA) Dressing in place  (R) Heel/Foot/Toe WDL (BKA)  (L) Heel/Foot/Toe WDL (BKA)          Devices In Places Pulse Ox, Tomas and Nasal Cannula      Interventions In Place Gray Ear Foams, Sacral Mepilex, Pillows, Q2 Turns and Low Air Loss Mattress    Possible Skin Injury No    Pictures Uploaded Into Epic N/A  Wound Consult Placed N/A  RN Wound Prevention Protocol Ordered No

## 2021-06-05 NOTE — PROGRESS NOTES
0835 Pt verbalizing SOB, and chest tightness. Pt unable to say more than few words at the time due to SOB. Pt on 4L NC with 96%oxygen. VS /53, Pulse 99. Rapid response initiated.    0930 Pt off the floor to dialysis. Breakfast taken by patient with transport. Spoke to dialysis nurse, S[place]a, to make sure patient is able to eat breakfast down there.

## 2021-06-05 NOTE — PROGRESS NOTES
1030 Received call from [facility]. Troponin level 161. Results read back to Whit. Dr. Gay informed in person. New order to redraw troponing in 6 hrs received.

## 2021-06-05 NOTE — PROGRESS NOTES
Hemodialysis ordered by Dr. Mahajan. Treatment started at 0952 and ended at 1252. Pt stable, vss, no c/o post tx. See flow sheets for details. Net UF 2.0 L. Reported to KRYSTEN Rothman RN. Lt ua avf dressing clean, dry, intact.

## 2021-06-05 NOTE — PROGRESS NOTES
[facility] Nephrology Daily Progress Note    Date of Service  6/5/2021    Author: TABATHA Alvarez    Chief Complaint  76 y/o man with ESRD HD T,TH,Sat at [place] presented with hypotension and inability to HD. On routine labs noted to have hyperkalemia.  Pt has increased erythema of 4th toe on left with some spreading erythema. Pt reports that this is worsening over the last few weeks, but though it secondary to trauma.     No F/C/N/V/CP/SOB.  No melena, hematochezia, hematemesis.  No HA, visual changes, or abdominal pain.    Daily Nephrology Summary:   5/20 - Consult done  5/21 - sitting up in bed, feeling good, appetite good, mild non-productive cough, tolerated HD yesterday UF: 158ml  5/22 - seen on HD, some hypotension this am, limited UF with HD, vascular to consult   5/23 - sitting up in bed, c/o severe pain in foot and L hand, vascular saw pt and plan poss intervention   5/24 - NAEO, feels good, having issues with dropping objects recently over the past 3-4 days  5/25 - NAEO, no complaints, sleeping comfortably  5/26 - NAEO, no complaints  5/27 - NAEO, no complaints, feels good  5/28 - NAEO, no complaints, scheduled for BKA today, family at bedside  5/29 - NAEO, no complaints today.  Per dialysis nurse clots on arterial line when initiating dialysis.  High arterial pressures.  5/30 - HD stopped early yesterday due to clotting, AVG U/S with patency  5/31 - NAEO, no complaints, feels good  6/01 - NAEO, no complaints, feels good, cannulation with 15G needles today  6/02 - NAEO, no complaints  6/03 - NAEO, no complaints, resting comfortably  6/04 - No overnight events or complaints.   6/05 - Pt seen at end of HD, 2L UF, denies complaints/concerns, review of RN note reveals rapid response this AM for SOB/chest tightness, none at present, VSS, pt feels like he has phlegm in his throat and is self suctioning PRN, labs reviewed, CXR with interstitial edema/CHF prior to HD    Review of  Systems  Review of Systems   Constitutional: Negative for chills and fever.   Respiratory: Negative for cough and shortness of breath.    Cardiovascular: Negative for chest pain and leg swelling.   Gastrointestinal: Negative for nausea and vomiting.   Neurological: Negative for seizures and loss of consciousness.   All other systems reviewed and are negative.    As per HPI/daily summary.    Physical Exam  Temp:  [36.3 °C (97.3 °F)-36.6 °C (97.9 °F)] 36.4 °C (97.5 °F)  Pulse:  [72-94] 94  Resp:  [16-24] 22  BP: (104-160)/(33-80) 160/80  SpO2:  [92 %-96 %] 95 %    Physical Exam  Vitals and nursing note reviewed.   Constitutional:       Appearance: Normal appearance.   HENT:      Head: Normocephalic and atraumatic.      Nose: Nose normal.      Mouth/Throat:      Mouth: Mucous membranes are moist.   Eyes:      General: No scleral icterus.        Right eye: No discharge.         Left eye: No discharge.   Cardiovascular:      Rate and Rhythm: Normal rate and regular rhythm.      Comments: L AVG   Pulmonary:      Effort: Pulmonary effort is normal.      Breath sounds: Normal breath sounds.      Comments: Upper airway congestion    Dimin bases  Abdominal:      General: Bowel sounds are normal.      Palpations: Abdomen is soft.   Musculoskeletal:         General: No tenderness or deformity.      Cervical back: Neck supple. No muscular tenderness.      Right lower leg: No edema.      Left lower leg: No edema.      Comments: R and L BKA   Skin:     General: Skin is warm and dry.   Neurological:      Mental Status: He is alert. Mental status is at baseline.      Motor: Weakness present.   Psychiatric:         Mood and Affect: Mood normal.         Behavior: Behavior normal.     Fluids    Intake/Output Summary (Last 24 hours) at 6/5/2021 1318  Last data filed at 6/5/2021 0318  Gross per 24 hour   Intake 740 ml   Output 700 ml   Net 40 ml     Laboratory  Recent Labs     06/03/21  0415 06/04/21  0601 06/05/21  0754   WBC 11.0* 10.0  9.2   RBC 2.50* 2.50* 2.43*   HEMOGLOBIN 8.2* 8.3* 8.1*   HEMATOCRIT 26.3* 26.3* 25.2*   .2* 105.2* 103.7*   MCH 32.8 33.2* 33.3*   MCHC 31.2* 31.6* 32.1*   RDW 63.7* 62.9* 62.4*   PLATELETCT 443 373 373   MPV 9.3 8.8* 8.7*     Recent Labs     06/03/21  0415 06/04/21  0601 06/05/21  0754   SODIUM 131* 136 134*   POTASSIUM 4.8 4.2 4.5   CHLORIDE 93* 97 96   CO2 26 29 26   GLUCOSE 101* 105* 108*   BUN 32* 23* 33*   CREATININE 5.46* 4.14* 5.56*   CALCIUM 8.9 9.0 9.2         Recent Labs     06/02/21  1427   NTPROBNP >13229*           Imaging  Available labs, imaging and clinical documentation reviewed.     Assessment  # ESRD   Maint HD qTTS via LUE AVG +T/B   U/S with patency    AVG cannulated without issue today, seems to be dependent on BP  # Hypotension   Controlled with midodrine 10 mg TID  # PVD with left 4th great toe discoloration   Per vascular    Non-healing   S/p BKA 5/28   # Cardiomyopathy: acute worsening. EF now 15-20%  # 80% ostial LAD in-stent restenosis:   # Steal syndrome: s/p DRI. Worsening apparent ischemia in setting of heart failure  # Anemia in CKD, below target    Iron sat okay / ferritin elevated   ARMANDO   # Atrial fibrillation, rate controlled    On Eliquis and Plavix   # Diabetes mellitus II  # Hyperthyroidism   Tapazole  # Hyponatremia in ESRD, mild    UF with HD   # Hyperkalemia, corrected    Manage with HD   # Hyperphosphatemia   Controlled    Sevelamer TID with meals   # BMD-CKD   Managed at OP unit   Calcitriol   Sevelamer   # Weakness/debility     PLAN:  - Continue iHD qTTS/PRN, treatment today (SAT)  - Challenge UF as able  - Daily eval for additional treatments   - No need for vascular eval at this time, will refer to AC as OP, unless further issues develop  - ARMANDO increased 6/4, goal Hgb 10-11   - Transfuse PRN Hgb <7   - Phos binders with meals  - Dose all meds per ESRD  - Low Na/fluid restricted diet  - Ongoing GOC discussions  - Follow labs    Thank you,

## 2021-06-06 PROBLEM — J96.01 ACUTE RESPIRATORY FAILURE WITH HYPOXIA (HCC): Status: RESOLVED | Noted: 2021-01-01 | Resolved: 2021-01-01

## 2021-06-06 PROBLEM — L03.116 CELLULITIS OF LEFT FOOT: Status: RESOLVED | Noted: 2021-01-01 | Resolved: 2021-01-01

## 2021-06-06 PROBLEM — I99.8 ISCHEMIA OF TOE: Status: RESOLVED | Noted: 2021-01-01 | Resolved: 2021-01-01

## 2021-06-06 NOTE — DISCHARGE INSTRUCTIONS
Discharge Instructions    Discharged to other by medical transportation with escort. Discharged via wheelchair, hospital escort: Yes.  Special equipment needed: Not Applicable    Be sure to schedule a follow-up appointment with your primary care doctor or any specialists as instructed.     Discharge Plan:        I understand that a diet low in cholesterol, fat, and sodium is recommended for good health. Unless I have been given specific instructions below for another diet, I accept this instruction as my diet prescription.   Other diet: Renal cardiac     Special Instructions: Discharge instructions for the Orthopedic Patient    Follow up with Primary Care Physician within 2 weeks of discharge to home, regarding:  Review of medications and diagnostic testing.  Surveillance for medical complications.  Workup and treatment of osteoporosis, if appropriate.     -Is this a Hip/Knee/Shoulder Joint Replacement patient? No    -Is this patient being discharged with medication to prevent blood clots?  No    · Is patient discharged on Warfarin / Coumadin?   No     Depression / Suicide Risk    As you are discharged from this [facility] facility, it is important to learn how to keep safe from harming yourself.    Recognize the warning signs:  · Abrupt changes in personality, positive or negative- including increase in energy   · Giving away possessions  · Change in eating patterns- significant weight changes-  positive or negative  · Change in sleeping patterns- unable to sleep or sleeping all the time   · Unwillingness or inability to communicate  · Depression  · Unusual sadness, discouragement and loneliness  · Talk of wanting to die  · Neglect of personal appearance   · Rebelliousness- reckless behavior  · Withdrawal from people/activities they love  · Confusion- inability to concentrate     If you or a loved one observes any of these behaviors or has concerns about self-harm, here's what you can do:  · Talk about it- your  feelings and reasons for harming yourself  · Remove any means that you might use to hurt yourself (examples: pills, rope, extension cords, firearm)  · Get professional help from the community (Mental Health, Substance Abuse, psychological counseling)  · Do not be alone:Call your Safe Contact- someone whom you trust who will be there for you.  · Call your local CRISIS HOTLINE 523-1292 or 894-858-5482  · Call your local Children's Mobile Crisis Response Team [place] (181) 322-5283 or www.Teramind  · Call the toll free National Suicide Prevention Hotlines   · National Suicide Prevention Lifeline 207-047-SMNL (6617)  · National Hope Line Network 800-SUICIDE (653-9903)

## 2021-06-06 NOTE — CARE PLAN
The patient is Watcher - Medium risk of patient condition declining or worsening    Shift Goals  Clinical Goals: decrease O2 use, free from fall   Patient Goals: get dialysis    Progress made toward(s) clinical / shift goals:  Appropriate fall precautions in place. Patient is able to maintain oxygen saturation above 92% on 3 liters of oxygen.     Patient is not progressing towards the following goals:

## 2021-06-06 NOTE — CARE PLAN
The patient is stable, pain assessed every 2 hours, medicated as needed, comfort measures provided.    Shift Goals  Clinical Goals: wean O2, free of falls  Patient Goals: get dialysis    Progress made toward(s) clinical / shift goals   Patient isprogressing towards the following goals

## 2021-06-06 NOTE — DISCHARGE PLANNING
Agency/Facility Name: [facility]  Spoke To: Kimberly  Outcome: DPA called requesting transport time be changed to 1700.  GMT will  between [phone].    JAN WILLIS Pat notified via voalte.

## 2021-06-06 NOTE — PROGRESS NOTES
See APRN/student NP note for details. The patient was evaluated with the APRN.  I reviewed her note and agree with her findings and plan as documented.  The chart was reviewed and summarized.  Available labs, imaging, and pertinent patient data were also reviewed.  Available nursing, consultant, and other records were also reviewed.  I am actively involved in the patient's care.       Be sure midodrine dosed ~30min prior to HD

## 2021-06-06 NOTE — DISCHARGE SUMMARY
Discharge Summary    CHIEF COMPLAINT ON ADMISSION  Chief Complaint   Patient presents with   • Weakness     x2 days   • Dizziness   • Hypotension     per dialysis clinic       Reason for Admission  EMS     CODE STATUS  DNAR/DNI    HPI & HOSPITAL COURSE    75 y.o. male who presented 5/20/2021 with left foot pain  Mr. Sepulveda has a past medical history of end-stage renal disease on dialysis, peripheral vascular disease status post right below the knee amputation, insulin-dependent diabetes mellitus, there was most recently admitted here from 5/10/2021 through 5/18/2021 with shortness of breath.  During that hospitalization he underwent heart catheterization revealing ejection fraction of 15 to 20% with 80% ostial LAD in-stent restenosis and distal RCA disease.  He presents back today with 4 to 5 days of left foot pain especially when walking with his walker.  He has been compliant with dialysis and went on Tuesday.  He was admitted for cellulitis of the left foot with what appears to be an ischemic left fourth toe.  He has been on Eliquis which was be held in case he requires amputation.  Upon admission, he was seen by LPS.  ID saw him as well and DC'd his antibiotics as he was not septic.  Palliative care saw him and patient does want surgery for now.  Vascular is evaluating him to see if he is a candidate for surgery.      75-year-old male with a past medical history of end-stage renal disease on dialysis, peripheral vascular disease status post right below the knee amputation, insulin-dependent diabetes mellitus, there was most recently admitted here from 5/10/2021 through 5/18/2021 when he underwent heart catheterization revealing ejection fraction of 15 to 20% with 80% ostial LAD in-stent restenosis and distal RCA disease was admitted on 5/20/2021 for cellulitis of the left fourth toe foot secondary to ischemic.    Patient was evaluated by vascular surgery which determined that patient was not a candidate for  revascularization procedures.  Infectious disease following for which she was started on a short course of meropenem.  He underwent left BKA on 5/28/2021 which she tolerated well.  IV antibiotics discontinued by infectious disease shortly after.  Throughout hospital course, nephrology having difficulty with dialysis secondary to poor AV fistula flow.  Patient underwent arterial AV fistula Doppler which showed fistula is patent.  Nephrology was able to use AV fistula successfully during hemodialysis.  Current recommendations to follow-up in outpatient setting.        6/1/21: The patient was seen and evaluated at bedside and appears comfortable.  Patient states that his pain is currently well controlled after undergoing left BKA.  He is tolerating oral intake and denies any chest pains, palpitations, or shortness of breath.  Of note, patient still requiring 3 to 4 L via nasal cannula.  He states he has a history of COPD we are pending respiratory therapist evaluation.  He denies any fever/chills or productive cough.  No other overnight events reported.        6/2/2021: The patient seen and evaluated at bedside and appears comfortable however he still has persistent supplemental oxygen needs.  Repeat chest x-ray shows worsening right-sided pleural effusion as well as interstitial edema.  This is likely multifactorial in the setting of noncompliance with hemodialysis as well as history of congestive heart failure reduced ejection fraction.  He will continue with dialysis sessions and we will begin IV diuresis as tolerated.  Pending procalcitonin to rule out infectious cause.  At this time patient denies any productive cough, no chest pains, palpitations, or nausea/vomiting.  No other overnight events reported by nursing staff.     6/3/2021: The patient was seen and evaluated bedside and states that his breathing has somewhat improved with nebulization therapy as well as IV diuretics.  There was initial concern for  infection however this has been ruled out with improving white count and absence of fever/productive cough.  Patient will need to continue dialysis to improve volume status.  He is tolerating oral intake and states pain is well controlled.  We are pending placement to SNF for continued physical therapy.  No other overnight events reported.     6/4/2021: The patient was seen and evaluated bedside and states that his breathing has improved overnight.  Trial of Lasix has been unsuccessful and had since been discontinued.  Patient will need continued dialysis and supplemental oxygen support.  He states that he is tolerating oral intake and able to tolerate being out of bed in chair.  Pending placement to SNF for continued physical therapy.  No other overnight events reported.     6/5/2021: The patient was seen and evaluated at bedside where a rapid response was called.  According to nursing staff patient was noted to be in respiratory distress presenting as shortness of breath and difficulty completing sentences.  Patient did not have any episodes of desaturation.  Troponins were drawn and are actually better than his baseline troponin levels in the past which are expected to be elevated in the setting of end-stage renal disease.  Patient notes chest pressure with shortness of breath likely pleuritic chest pain.  EKG reviewed without any significant changes compared to baseline.  Patient will continue with respiratory therapy.  Dialysis has been alerted of the patient's worsening shortness of breath and was encouraged to remove more fluid during dialysis session today.  Repeat chest x-ray does not show interval worsening of right-sided pleural effusion.  If with persistent shortness of breath we may consider thoracentesis.     6/6/2021: The patient was seen and evaluated at bedside and states that his breathing has improved.  He has not had any further episodes of respiratory distress overnight.  Patient will need to  continue aggressive dialysis sessions during his admission to correct his volume overload status.  At this time patient denies any chest pains, palpitations, or nausea/vomiting. Patient with acceptance to skilled nursing facility. He is currently medically cleared for discharge.    Therefore, he is discharged in fair and stable condition to skilled nursing facility.    The patient met 2-midnight criteria for an inpatient stay at the time of discharge.      FOLLOW UP ITEMS POST DISCHARGE  Follow up with your PCP in one week  Continue hemodialysis as scheduled and follow with nephrologist.  Follow up with cardiologist to resume goal directed medical therapy which has been held for hypotension.      DISCHARGE DIAGNOSES  Principal Problem:    Cellulitis of left foot POA: Yes  Active Problems:    Pulmonary edema POA: Unknown    Hyponatremia POA: Yes    CAD (coronary artery disease) POA: Yes      Overview: Stents to proximal and mid LAD in [facility][1].    Macrocytic anemia POA: Yes    Type 2 diabetes mellitus with kidney complication, with long-term current use of insulin ([facility]) POA: Yes    End stage renal disease on dialysis ([facility]) POA: Yes    Paroxysmal A-fib ([facility]) POA: Yes    Hypotension POA: Yes    Peripheral vascular disease ([facility]) POA: Yes    Cardiomyopathy, ischemic POA: Yes    Acute respiratory failure with hypoxia ([facility]) POA: No    Status post below-knee amputation of right lower extremity ([facility]) POA: Yes    Advance care planning POA: Yes    Ischemia of toe POA: Yes    DNR (do not resuscitate) POA: Yes    S/P BKA (below knee amputation) unilateral, left (HCC) POA: No    CHF (congestive heart failure) ([facility]) POA: Unknown  Resolved Problems:    * No resolved hospital problems. *      FOLLOW UP  Future Appointments   Date Time Provider Department Center   6/25/2021  2:00 PM TABATHA Laws [facility] None   7/30/2021 10:00 AM Kulwant Osborn M.D. [facility] None     No follow-up provider specified.    MEDICATIONS ON  DISCHARGE     Medication List      ASK your doctor about these medications      Instructions   apixaban 5mg Tabs  Commonly known as: ELIQUIS   Take 1 tablet by mouth 2 times a day. Indications: Thromboembolism secondary to Atrial Fibrillation  Dose: 5 mg     atorvastatin 40 MG Tabs  Commonly known as: LIPITOR   Take 1 tablet by mouth at bedtime.  Dose: 40 mg     calcitRIOL 0.25 MCG Caps  Commonly known as: ROCALTROL   Take 1 capsule by mouth every day.  Dose: 0.25 mcg     carvedilol 3.125 MG Tabs  Commonly known as: COREG   Take 1 tablet by mouth 2 times a day with meals. Hold if blood pressure <100 and heart rate <60  Dose: 3.125 mg     clopidogrel 75 MG Tabs  Commonly known as: PLAVIX   Take 1 tablet by mouth every day.  Dose: 75 mg     gabapentin 300 MG Caps  Commonly known as: NEURONTIN   Take 1 capsule by mouth every day.  Dose: 300 mg     Lantus SoloStar 100 UNIT/ML Sopn injection  Generic drug: insulin glargine   Inject 8 Units under the skin at bedtime for 30 days.  Dose: 8 Units     methimazole 5 MG Tabs  Commonly known as: TAPAZOLE   Take 1 tablet by mouth 2 times a day.  Dose: 5 mg     midodrine 10 MG tablet  Commonly known as: PROAMATINE   Take 1 tablet by mouth 3 times a day with meals.  Dose: 10 mg     omeprazole 20 MG delayed-release capsule  Commonly known as: PRILOSEC   Take 1 capsule by mouth 2 times a day.  Dose: 20 mg     oxyCODONE-acetaminophen 5-325 MG Tabs  Commonly known as: PERCOCET  Ask about: Should I take this medication?   Take 1 tablet by mouth every 8 hours as needed for Severe Pain for up to 5 days.  Dose: 1 tablet     sevelamer carbonate 800 MG Tabs tablet  Commonly known as: RENVELA   Take 2 Tablets by mouth 3 times a day with meals.  Dose: 1,600 mg     tamsulosin 0.4 MG capsule  Commonly known as: FLOMAX   Take 2 Capsules by mouth at bedtime.  Dose: 0.8 mg     traZODone 50 MG Tabs  Commonly known as: DESYREL   Take 1 tablet by mouth at bedtime.  Dose: 50 mg       "      Allergies  Allergies   Allergen Reactions   • Mircera [Methoxy Polyethylene Glycol-Epoetin Beta] Hives   • Other Drug      \"Opiods\" caused  hallucinations       DIET  Orders Placed This Encounter   Procedures   • Diet Order Diet: Renal; Second Modifier: (optional): Cardiac     Standing Status:   Standing     Number of Occurrences:   1     Order Specific Question:   Diet:     Answer:   Renal [8]     Order Specific Question:   Second Modifier: (optional)     Answer:   Cardiac [6]       ACTIVITY  As tolerated and directed by skilled nursing.  Weight bearing as tolerated    LINES, DRAINS, AND WOUNDS  This is an automated list. Peripheral IVs will be removed prior to discharge.  Peripheral IV 06/02/21 20 G Right Upper arm (Active)   Site Assessment Clean;Dry;Intact 06/06/21 0800   Dressing Type Transparent 06/06/21 0800   Line Status Scrubbed the hub prior to access;Flushed;Saline locked 06/06/21 0800   Dressing Status Clean;Dry;Intact 06/06/21 0800   Dressing Intervention N/A 06/05/21 2000   Dressing Change Due 06/05/21 06/04/21 0900   Infiltration Grading ([facility], [facility] 0 06/05/21 2000   Phlebitis Scale ([facility] Only) 0 06/05/21 2000     Urethral Catheter (Active)   Site Assessment Clean;Skin intact 06/06/21 0800   Collection Container Standard drainage bag 06/06/21 0800   Urinary Catheter Care Tamper Evident Seal in Place;Drainage Tube Extended;Drainage Bag Not Overfilled;Drainage Tubing Properly Secured;Drainage Bag Below Bladder Level and Not on Floor 06/06/21 0800   Securement Method Securing device (Describe) 06/06/21 0800   Output (mL) 275 mL 06/06/21 0400      Wound 05/20/21 Soft Tissue Necrosis Foot;Toe, 2nd;Toe, 3rd (Active)       Wound 12/31/20 Knee Lower Right BKA (Active)       Wound 01/06/21 Buttocks;Coccyx (Active)       Wound 01/13/21 Pressure Injury Heel Posterior;Upper Left over a bump on calcaneal tendon POA (Active)       Wound 05/26/21 Pressure Injury Sacrum Bilateral DTI 5/29/21 (Active) "   Wound Image    06/01/21 1300   Site Assessment Pink;Red;Excoriated;Fragile 06/06/21 0800   Periwound Assessment Dry;Intact 06/06/21 0800   Margins Unattached edges 06/06/21 0800   Closure Open to air 06/06/21 0800   Drainage Amount None 06/06/21 0800   Treatments Offloading 06/06/21 0800   Wound Cleansing Foam Cleanser/Washcloth 06/01/21 1300   Periwound Protectant Barrier Paste 06/06/21 0800   Dressing Cleansing/Solutions Not Applicable 06/01/21 1300   Dressing Options Mepilex 06/06/21 0800   Dressing Changed Observed 06/06/21 0800   Dressing Status Clean;Dry;Intact 06/06/21 0800   Dressing Change/Treatment Frequency Every 72 hrs, and As Needed 06/06/21 0800   NEXT Dressing Change/Treatment Date 06/07/21 06/04/21 0930   NEXT Weekly Photo (Inpatient Only) 06/07/21 06/01/21 1300   Pressure Injury Stage DTPI 05/29/21 1700   Wound Length (cm) 3 cm 05/29/21 1700   Wound Width (cm) 5 cm 05/29/21 1700   Wound Surface Area (cm^2) 15 cm^2 05/29/21 1700   Shape irregular 06/01/21 1300   Wound Odor None 06/01/21 1300   Pulses N/A 06/01/21 1300   Exposed Structures None 06/01/21 1300   WOUND NURSE ONLY - Time Spent with Patient (mins) 60 06/01/21 1300       Wound 05/28/21 Incision Leg Left adaptic, 4x4s, webril, ace (Active)   Site Assessment EMELIA;Other (Comment) 06/06/21 0800   Periwound Assessment EMELIA 06/06/21 0800   Margins EMELIA 06/06/21 0800   Closure EMELIA 06/06/21 0800   Drainage Amount EMELIA 06/06/21 0800   Drainage Description EMELIA 06/06/21 0800   Treatments Site care 06/01/21 0858   Wound Cleansing Not Applicable 06/01/21 0858   Periwound Protectant Not Applicable 06/01/21 0858   Dressing Cleansing/Solutions Not Applicable 06/01/21 0858   Dressing Options Adaptic;Dry Gauze;Dry Roll Gauze;Ace Wrap 06/04/21 2145   Dressing Changed Observed 06/06/21 0800   Dressing Status Clean;Dry;Intact 06/06/21 0800   Dressing Change/Treatment Frequency As Needed 06/05/21 0845       Peripheral IV 06/02/21 20 G Right Upper arm (Active)    Site Assessment Clean;Dry;Intact 06/06/21 0800   Dressing Type Transparent 06/06/21 0800   Line Status Scrubbed the hub prior to access;Flushed;Saline locked 06/06/21 0800   Dressing Status Clean;Dry;Intact 06/06/21 0800   Dressing Intervention N/A 06/05/21 2000   Dressing Change Due 06/05/21 06/04/21 0900   [place] [facility] [place] 0 06/05/21 2000   Phlebitis Scale ([facility] Only) 0 06/05/21 2000       Hemodialysis AV Graft/Fistula 04/21/20 Left AV fistula Upper arm (Active)   AV Fistula Status Bruit present;Thrill present 06/06/21 0800   Site Assessment Clean;Dry;Intact 06/06/21 0800   Status Patent;Treatment performed 06/05/21 1252   Local Anesthetic None 06/05/21 1252   Site Prep Alcohol 06/05/21 1252   Needle Size 15 G 06/05/21 1252   Dressing Type Gauze 06/05/21 1252   Dressing Status Clean;Dry;Intact 06/05/21 1252   Dressing Intervention Initial dressing 06/05/21 1252           Urethral Catheter (Active)   Site Assessment Clean;Skin intact 06/06/21 0800   Collection Container Standard drainage bag 06/06/21 0800   Urinary Catheter Care Tamper Evident Seal in Place;Drainage Tube Extended;Drainage Bag Not Overfilled;Drainage Tubing Properly Secured;Drainage Bag Below Bladder Level and Not on Floor 06/06/21 0800   Securement Method Securing device (Describe) 06/06/21 0800   Output (mL) 275 mL 06/06/21 0400        MENTAL STATUS ON TRANSFER             CONSULTATIONS  Nephrology  Surgery  ID    PROCEDURES  BKA    LABORATORY  Lab Results   Component Value Date    SODIUM 135 06/06/2021    POTASSIUM 4.3 06/06/2021    CHLORIDE 97 06/06/2021    CO2 27 06/06/2021    GLUCOSE 111 (H) 06/06/2021    BUN 25 (H) 06/06/2021    CREATININE 4.31 (H) 06/06/2021        Lab Results   Component Value Date    WBC 9.0 06/06/2021    HEMOGLOBIN 8.0 (L) 06/06/2021    HEMATOCRIT 25.8 (L) 06/06/2021    PLATELETCT 394 06/06/2021        Total time of the discharge process exceeds 35 minutes.

## 2021-06-06 NOTE — PROGRESS NOTES
[facility] Medicine Daily Progress Note    Date of Service  6/6/2021    Chief Complaint  75 y.o. male admitted 5/20/2021 with weakness and dizziness    Hospital Course  75-year-old male with a past medical history of end-stage renal disease on dialysis, peripheral vascular disease status post right below the knee amputation, insulin-dependent diabetes mellitus, there was most recently admitted here from 5/10/2021 through 5/18/2021 when he underwent heart catheterization revealing ejection fraction of 15 to 20% with 80% ostial LAD in-stent restenosis and distal RCA disease was admitted on 5/20/2021 for cellulitis of the left fourth toe foot secondary to ischemic.    Patient was evaluated by vascular surgery which determined that patient was not a candidate for revascularization procedures.  Infectious disease following for which she was started on a short course of meropenem.  He underwent left BKA on 5/28/2021 which she tolerated well.  IV antibiotics discontinued by infectious disease shortly after.  Throughout hospital course, nephrology having difficulty with dialysis secondary to poor AV fistula flow.  Patient underwent arterial AV fistula Doppler which showed fistula is patent.  Nephrology was able to use AV fistula successfully during hemodialysis.  Current recommendations to follow-up in outpatient setting.    Interval Problem Update    6/1/21: The patient was seen and evaluated at bedside and appears comfortable.  Patient states that his pain is currently well controlled after undergoing left BKA.  He is tolerating oral intake and denies any chest pains, palpitations, or shortness of breath.  Of note, patient still requiring 3 to 4 L via nasal cannula.  He states he has a history of COPD we are pending respiratory therapist evaluation.  He denies any fever/chills or productive cough.  No other overnight events reported.      6/2/2021: The patient seen and evaluated at bedside and appears comfortable however he  still has persistent supplemental oxygen needs.  Repeat chest x-ray shows worsening right-sided pleural effusion as well as interstitial edema.  This is likely multifactorial in the setting of noncompliance with hemodialysis as well as history of congestive heart failure reduced ejection fraction.  He will continue with dialysis sessions and we will begin IV diuresis as tolerated.  Pending procalcitonin to rule out infectious cause.  At this time patient denies any productive cough, no chest pains, palpitations, or nausea/vomiting.  No other overnight events reported by nursing staff.    6/3/2021: The patient was seen and evaluated bedside and states that his breathing has somewhat improved with nebulization therapy as well as IV diuretics.  There was initial concern for infection however this has been ruled out with improving white count and absence of fever/productive cough.  Patient will need to continue dialysis to improve volume status.  He is tolerating oral intake and states pain is well controlled.  We are pending placement to SNF for continued physical therapy.  No other overnight events reported.    6/4/2021: The patient was seen and evaluated bedside and states that his breathing has improved overnight.  Trial of Lasix has been unsuccessful and had since been discontinued.  Patient will need continued dialysis and supplemental oxygen support.  He states that he is tolerating oral intake and able to tolerate being out of bed in chair.  Pending placement to SNF for continued physical therapy.  No other overnight events reported.    6/5/2021: The patient was seen and evaluated at bedside where a rapid response was called.  According to nursing staff patient was noted to be in respiratory distress presenting as shortness of breath and difficulty completing sentences.  Patient did not have any episodes of desaturation.  Troponins were drawn and are actually better than his baseline troponin levels in the past  which are expected to be elevated in the setting of end-stage renal disease.  Patient notes chest pressure with shortness of breath likely pleuritic chest pain.  EKG reviewed without any significant changes compared to baseline.  Patient will continue with respiratory therapy.  Dialysis has been alerted of the patient's worsening shortness of breath and was encouraged to remove more fluid during dialysis session today.  Repeat chest x-ray does not show interval worsening of right-sided pleural effusion.  If with persistent shortness of breath we may consider thoracentesis.    6/6/2021: The patient was seen and evaluated at bedside and states that his breathing has improved.  He has not had any further episodes of respiratory distress overnight.  Patient will need to continue aggressive dialysis sessions during his admission to correct his volume overload status.  At this time patient denies any chest pains, palpitations, or nausea/vomiting.  No other overnight events reported.        Disposition  Pending [facility]     Review of Systems  Review of Systems   Constitutional: Negative for chills and fever.   HENT: Negative for hearing loss and tinnitus.    Eyes: Negative for blurred vision and double vision.   Respiratory: Negative for cough and shortness of breath.    Cardiovascular: Negative for chest pain and palpitations.   Gastrointestinal: Negative for blood in stool, melena, nausea and vomiting. Diarrhea: loose Stools.   Genitourinary: Negative for dysuria and urgency.   Musculoskeletal: Negative for back pain and myalgias.   Skin: Negative for itching and rash.   Neurological: Negative for dizziness and headaches.   Endo/Heme/Allergies: Does not bruise/bleed easily.   Psychiatric/Behavioral: Negative for depression and suicidal ideas.        Physical Exam  Temp:  [36.2 °C (97.2 °F)-36.8 °C (98.2 °F)] 36.6 °C (97.8 °F)  Pulse:  [77-99] 93  Resp:  [16-20] 19  BP: ()/(36-81) 111/60  SpO2:  [90 %-95 %] 95  %    Physical Exam  Vitals reviewed.   Constitutional:       General: He is not in acute distress.     Appearance: Normal appearance. He is well-developed. He is obese. He is not diaphoretic.   HENT:      Head: Normocephalic and atraumatic.      Right Ear: External ear normal.      Left Ear: External ear normal.      Mouth/Throat:      Mouth: Mucous membranes are moist.      Pharynx: Oropharynx is clear.   Eyes:      Extraocular Movements: Extraocular movements intact.      Conjunctiva/sclera: Conjunctivae normal.      Pupils: Pupils are equal, round, and reactive to light.   Cardiovascular:      Rate and Rhythm: Normal rate and regular rhythm.      Pulses: Normal pulses.      Heart sounds: Normal heart sounds.   Pulmonary:      Effort: Pulmonary effort is normal.      Breath sounds: Normal breath sounds.      Comments: Decreased breath sounds at bases  Abdominal:      General: Abdomen is flat. Bowel sounds are normal.      Palpations: Abdomen is soft.   Musculoskeletal:         General: No swelling or tenderness. Normal range of motion.      Cervical back: Normal range of motion and neck supple. No muscular tenderness.      Comments: Right BKA  Left BKA with tender surgical site   Skin:     General: Skin is warm and dry.   Neurological:      General: No focal deficit present.      Mental Status: He is alert and oriented to person, place, and time. Mental status is at baseline.   Psychiatric:         Mood and Affect: Mood normal.         Behavior: Behavior normal.         Thought Content: Thought content normal.         Fluids    Intake/Output Summary (Last 24 hours) at 6/6/2021 1008  Last data filed at 6/6/2021 0400  Gross per 24 hour   Intake 940 ml   Output 2775 ml   Net -1835 ml       Laboratory  Recent Labs     06/04/21  0601 06/05/21  0754 06/06/21  0720   WBC 10.0 9.2 9.0   RBC 2.50* 2.43* 2.45*   HEMOGLOBIN 8.3* 8.1* 8.0*   HEMATOCRIT 26.3* 25.2* 25.8*   .2* 103.7* 105.3*   MCH 33.2* 33.3* 32.7   MCHC  31.6* 32.1* 31.0*   RDW 62.9* 62.4* 63.2*   PLATELETCT 373 373 394   MPV 8.8* 8.7* 8.9*     Recent Labs     06/04/21  0601 06/05/21  0754 06/06/21  0720   SODIUM 136 134* 135   POTASSIUM 4.2 4.5 4.3   CHLORIDE 97 96 97   CO2 29 26 27   GLUCOSE 105* 108* 111*   BUN 23* 33* 25*   CREATININE 4.14* 5.56* 4.31*   CALCIUM 9.0 9.2 9.1                   Imaging  DX-CHEST-PORTABLE (1 VIEW)   Final Result      1.  Enlarged chronic cardiac silhouette with changes most consistent with interstitial edema/CHF.      IR-US GUIDED PIV   Final Result    Ultrasound-guided PERIPHERAL IV INSERTION performed by    qualified nursing staff as above.      DX-CHEST-LIMITED (1 VIEW)   Final Result      1.  Moderate right-sided pleural effusion, increased from previous exam.   2.  Slightly worsened diffuse interstitial edema.      DX-CHEST-LIMITED (1 VIEW)   Final Result      1.  Cardiomegaly is pulmonary vascular congestion and interstitial edema.   2.  Layering right pleural effusion with overlying atelectasis/consolidation.   3.  Trace pleural fluid versus pleural thickening at the left lung base.   4.  Left lung base calcification is again seen which may related to prior infection or trauma.         US-HEMODIALYSIS GRAFT DUPLEX COMP UPPER EXTREMITY   Final Result      US-EXTREMITY ARTERY LOWER BILAT   Final Result      US-PATSY SINGLE LEVEL BILAT   Final Result      DX-FOOT-2- LEFT   Final Result      1.  Soft tissue swelling of LEFT 4th toe.   2.  No gross bony destruction however osteomyelitis is not excluded by plain film.   3.  Prior partial amputation of great toe.      DX-CHEST-PORTABLE (1 VIEW)   Final Result      Left lower lobe pleural thickening and calcification may be related to sequelae of prior infection or trauma.      Patchy left basilar opacity may represent atelectasis or pneumonitis.      Mild cardiomegaly.      Atherosclerotic plaque.              Assessment/Plan  * Cellulitis of left foot- (present on  admission)  Assessment & Plan  One dose of vancomycin given in the ER  On 5/22, foot wound culture grew ESBL  Wound culture growing Klebsiella pneumonia ESBL, but use me diabetes, and Staph aureus   ID with final recommendation is to discontinue IV antibiotics as patient is achieved effective source control with below-knee amputation.  Resolved    CHF (congestive heart failure) (HCC)  Assessment & Plan  Patient with history of CHFrEF of 30%  Persistent supplemental oxygen needs and worsening interstitial edema noted on chest imaging  Lasix ineffective in setting of ESRD  Will need to continue dialysis     S/P BKA (below knee amputation) unilateral, left (HCC)  Assessment & Plan  Status post left BKA on 5/28/2023 1  Scheduled and as needed pain mgmt  Orthopedic surgery following for post BKA care  Labs on AM    DNR (do not resuscitate)- (present on admission)  Assessment & Plan  Discussed with Mr. Sepulveda and he confirmed this status.    Ischemia of toe- (present on admission)  Assessment & Plan  S/P left BKA on 5/28  Resolved    Advance care planning- (present on admission)  Assessment & Plan  In a previous visit, he had indicated to palliative that he would consider hospice/comfort care  Currently, vascular recommend surgery and patient is agreeable  Consulted palliative care for further goals of care discussion after surgery    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)- (present on admission)  Assessment & Plan  Right BKA and left great toe amputation     Acute respiratory failure with hypoxia (HCC)  Assessment & Plan  Requiring 4 L nasal cannula with a saturation  Secondary to fluid overload from not completing dialysis  Nephrology following, patient for no trial of hemodialysis today  Patient also states he has a remote history of COPD, pending evaluation by respiratory therapy    Cardiomyopathy, ischemic- (present on admission)  Assessment & Plan  EF from heart cath 5/10  15-20%    Peripheral vascular disease (HCC)- (present on admission)  Assessment & Plan  Hx of multiple amputations     Hypotension- (present on admission)  Assessment & Plan  Chronic condition  Discontinue coreg  Continue home midodrine  Resolved    Paroxysmal A-fib ([facility])- (present on admission)  Assessment & Plan  Chronic condition  Patient is tolerating eliquis   Rate controlled     End stage renal disease on dialysis ([facility])- (present on admission)  Assessment & Plan  Dialysis via left arm fistula T,Th,S  Dr. Hodges, nephrology was consulted from the ER    Type 2 diabetes mellitus with kidney complication, with long-term current use of insulin ([facility])- (present on admission)  Assessment & Plan  With hyperglycemia  Continue sliding scale  Diabetic diet    Macrocytic anemia- (present on admission)  Assessment & Plan  Hemoglobin 6.5 today  No signs of GI bleed  Vit B12/folate unremarkable  Secondary to anemia of chronic kidney disease  Transfuse 1 unit of PRBC  Nephrology following, recommending iron repletion  Continue erythropoietin as per nephrology    CAD (coronary artery disease)- (present on admission)  Assessment & Plan  With hx of stents followed by [facility]  No active chest pain at this time  Unable to initiate beta blocker or ACEI due to episodes of hypotension     Hyponatremia- (present on admission)  Assessment & Plan  Mild, continue to monitor    Pulmonary edema  Assessment & Plan  Noted on 5/30/2021 chest x-ray  Complicated by acute hypoxic respiratory failure requiring 4 L nasal cannula  Likely secondary to fluid overload from difficulties accessing IV fistula/poor flow  Nephrology following, patient for another attempt of hemodialysis today  Labs on a.m.       VTE prophylaxis: apixaban

## 2021-06-06 NOTE — NON-PROVIDER
[facility] -  PROGRESS NOTE               Author: Marlee Nilton, Student Date & Time: 6/6/2021  12:19 PM     HPI:    Chief Complaint  76 y/o man with ESRD HD T,TH,Sat at [facility] presented with hypotension and inability to HD. On routine labs noted to have hyperkalemia.  Pt has increased erythema of 4th toe on left with some spreading erythema. Pt reports that this is worsening over the last few weeks, but though it secondary to trauma.     No F/C/N/V/CP/SOB.  No melena, hematochezia, hematemesis.  No HA, visual changes, or abdominal pain.     Daily Nephrology Summary:   5/20 - Consult done  5/21 - sitting up in bed, feeling good, appetite good, mild non-productive cough, tolerated HD yesterday UF: 158ml  5/22 - seen on HD, some hypotension this am, limited UF with HD, vascular to consult   5/23 - sitting up in bed, c/o severe pain in foot and L hand, vascular saw pt and plan poss intervention   5/24 - NAEO, feels good, having issues with dropping objects recently over the past 3-4 days  5/25 - NAEO, no complaints, sleeping comfortably  5/26 - NAEO, no complaints  5/27 - NAEO, no complaints, feels good  5/28 - NAEO, no complaints, scheduled for BKA today, family at bedside  5/29 - NAEO, no complaints today.  Per dialysis nurse clots on arterial line when initiating dialysis.  High arterial pressures.  5/30 - HD stopped early yesterday due to clotting, AVG U/S with patency  5/31 - NAEO, no complaints, feels good  6/01 - NAEO, no complaints, feels good, cannulation with 15G needles today  6/02 - NAEO, no complaints  6/03 - NAEO, no complaints, resting comfortably  6/04 - No overnight events or complaints.   6/05 - Pt seen at end of HD, 2L UF, denies complaints/concerns, review of RN note reveals rapid response this AM for SOB/chest tightness, none at present, VSS, pt feels like he has phlegm in his throat and is self suctioning PRN, labs reviewed, CXR with interstitial edema/CHF prior to  "HD  6/06 - Pt lying in bed, appears comfortable.  Reports improved SOB, On 3L O2 NC.  Fistula patent. VSS. HD scheduled again for Tuesday (6/8).        REVIEW OF SYSTEMS:    10 point ROS reviewed and is as per HPI/daily summary or otherwise negative    PMH/PSH/SH/FH: Reviewed and unchanged since admission note  CURRENT MEDICATIONS: Reviewed from admission to present day    VS:  /90   Pulse 93   Temp 36.5 °C (97.7 °F) (Temporal)   Resp 14   Ht 1.676 m (5' 6\")   Wt 92.5 kg (203 lb 14.8 oz)   SpO2 95%   BMI 32.91 kg/m²   Physical Exam  Vitals and nursing note reviewed.   Constitutional:       General: He is not in acute distress.  HENT:      Head: Normocephalic and atraumatic.   Pulmonary:      Effort: Pulmonary effort is normal.      Comments: Upper airway congestion with mild crackles in left lung base  Musculoskeletal:         General: No swelling.      Comments: Left BKA stump with ACE bandage, no drainage.  No dependant swelling noted.   Skin:     General: Skin is warm and dry.   Neurological:      Mental Status: He is alert.         Fluids:  In: 940 [P.O.:440; Dialysis:500]  Out: 2775     LABS:  Recent Labs     06/04/21  0601 06/05/21  0754 06/06/21  0720   SODIUM 136 134* 135   POTASSIUM 4.2 4.5 4.3   CHLORIDE 97 96 97   CO2 29 26 27   GLUCOSE 105* 108* 111*   BUN 23* 33* 25*   CREATININE 4.14* 5.56* 4.31*   CALCIUM 9.0 9.2 9.1       IMPRESSION:    # ESRD              Maint HD qTTS via LUE AVG +T/B              U/S with patency               AVG cannulated without issue yesterday, seems to be dependent on BP  # Hypotension              Controlled with midodrine 10 mg TID  # PVD with left 4th great toe discoloration              Per vascular               Non-healing              S/p BKA 5/28   # Cardiomyopathy: acute worsening. EF now 15-20%  # 80% ostial LAD in-stent restenosis:   # Steal syndrome: s/p DRI. Worsening apparent ischemia in setting of heart failure  # Anemia in CKD, below target Hgb " 8.0 6/6.              Iron sat okay / ferritin elevated              ARMANDO   # Atrial fibrillation, rate controlled               On Eliquis and Plavix   # Diabetes mellitus II  # Hyperthyroidism              Tapazole  # Hyponatremia in ESRD, corrected               UF with HD   # Hyperkalemia, corrected               Manage with HD   # Hyperphosphatemia              Controlled               Sevelamer TID with meals   # BMD-CKD              Managed at OP unit              Calcitriol              Sevelamer   # Weakness/debility    PLAN:  - Continue iHD qTTS/PRN, treatment yesterday with 2.0 L UF (Sat)  - Challenge UF as able  - Daily eval for additional treatments   - No need for vascular eval at this time, will refer to [facility] as OP, unless further issues develop  - ARMANDO increased 6/4, goal Hgb 10-11   - Transfuse PRN Hgb <7   - Phos binders with meals  - Dose all meds per ESRD  - Low Na/fluid restricted diet  - Ongoing GOC discussions  - Follow labs

## 2021-06-06 NOTE — CARE PLAN
Problem: Pain - Standard  Goal: Alleviation of pain or a reduction in pain to the patient’s comfort goal  Outcome: Progressing     Problem: Skin Integrity  Goal: Skin integrity is maintained or improved  Outcome: Progressing     Problem: Knowledge Deficit - Diabetes  Goal: Patient will demonstrate knowledge of insulin injection, symptoms, and treatment of hypoglycemia and diet prior to discharge  Outcome: Progressing   The patient is Watcher - Medium risk of patient condition declining or worsening    Shift Goals  Clinical Goals: wean O2, free of falls  Patient Goals: get dialysis    Progress made toward(s) clinical / shift goals:  Qshift skin check, pt educate don [id number] and to readjust position frequently, extra pillows available for support.   Pt medicated for pain ordered and PRN.     Patient is not progressing towards the following goals:

## 2021-06-06 NOTE — DISCHARGE PLANNING
Anticipated Discharge Disposition:SNF    Action: [facility] accepted to [facility] today. Transport arranged for [id number] today via [facility]. Approved Services will cover the cost of transport, $87.55. Pt aware of plan and agrees.    Barriers to Discharge: none    Plan:Transfer to [facility] today.

## 2021-06-06 NOTE — DISCHARGE PLANNING
Received Transport Form @ 7979  Spoke to Anuja @ [facility]    Transport is scheduled for 6/6/21 @9085-8097 going to [facility].    Reservation #[id number]    Quote for wheelchair transport is #87.55    RN LAZARO Martinez notified.    JEREMY left a voice message for Alanna notifying of transport time.

## 2021-06-06 NOTE — DISCHARGE PLANNING
JEREMY faxed the transport communication form and approved services to [facility] via manual fax.  Fax #[phone].

## 2021-06-07 NOTE — PROGRESS NOTES
This RN went over discharge instruction with patient, patient verbalize understanding of instructions. Patient prescriptions were placed in the package and sent with transport. Report given to JAN Francis at [facility]. Patient picked up by [facility], all belongings accounted for (wheelchair, prosthetic leg, 2 belongings bag, phone, tablet and ).

## 2021-06-07 NOTE — TELEPHONE ENCOUNTER
1. Caller Name: [place] (wife)                        Call Back Number: [phone]      How would the patient prefer to be contacted with a response: Phone call OK to leave a detailed message    2. SPECIFIC Action To Be Taken: Referral pending, please sign.    3. Diagnosis/Clinical Reason for Request: Referral to geriatrics     4. Specialty & Provider Name/Lab/Imaging Location: [facility]    5. Is appointment scheduled for requested order/referral: no    Patient was informed they will receive a return phone call from the office ONLY if there are any questions before processing their request. Advised to call back if they haven't received a call from the referral department in 5 days.

## 2021-06-07 NOTE — PROGRESS NOTES
" [facility]  PROGRESS NOTE    HPI:  Luis Sepulveda is a 75 y.o.  with a past medical history that includes type 2 diabetes,  ESRD on hemodialysis, PAD, Afib, L great toe amputation with tenotomy, HLD, s/p right BKA (12/2020 with Dr. Lynn) admitted 5/20/2021 for Cellulitis of left foot [L03.116].   LPS has been consulted for evaluation of necrotic left 4th toe. Patient reports he has occasional \"twinges\" of pain to left 4-5 toes since approximately early April 2021. He states he was unaware of any wounds or discoloration of his left toes. He states he was discharged from rehab in early April 2020 and has a home health Rn that comes to his home weekly every Friday. He states the home health RN had provided him with a mirror to check his feet but he is unable to adequately check his feet due to vision deterioration. Pain is worse with touch, relieved with rest and non-weight bearing. Denies fever, chills, nausea, vomiting, CP, HA, SOB.       Surgery date: 5/28/2021 with Dr. Márquez  Procedure: left below the knee amputation      INTERVAL HISTORY:  5/23/2021: Patient denies fevers, chills, nausea, vomiting.  Pain well controlled.   5/25/21: receiving HD at bedside. Seen by Dr. Encinas, no plans for revascularization or major vascular surgery.  Patient experiencing significant pain to left foot with light touch, describes pain as a \"vice\" around foot.  Per RN, BP low, concerns for sepsis, given fluid bolus and now to be restarted on antibiotics  5/30/2021: POD #2 s/p left BKA.  Hemovac drain with minimal output and was subsequently removed.  Incision is well approximated with sutures, mild edema, very mild incisional necrosis at medial and distal ends of incision but otherwise appears to be healing well.  Patient reports neuropathic pain.  Denies fever, chills, nausea, vomiting, chest pain, headache, shortness of breath.  6/6/2021: POD #9 s/p left BKA.  Incision is well approximated with " sutures.  Edema improved.  Mild scattered incisional necrosis and dry drainage noted to incision.  No fluctuance.  Hypersensitivity with light touch.  No erythema.  Patient pending discharge to [facility] today.  Wound care orders written and provided to discharge packet.    PERTINENT LPS RESULTS:   OR pathology from 5/28/2021: Pending  OR cultures from 5/28/2021 + ESBL    COVID-19: Not detected this admission     X-ray:      Dx-foot-2-left 5/20/2021     FINDINGS:  Prior partial amputation of great toe at the base of proximal phalanx.  Soft tissue swelling of 4th toe.  No soft tissue gas.  Diffuse vascular calcifications.  No focal bony destruction.  Calcaneal enthesophytes.  Calcification dorsal to calcaneus, possibly within Achilles tendon, chronic.     IMPRESSION:     1.  Soft tissue swelling of LEFT 4th toe.  2.  No gross bony destruction however osteomyelitis is not excluded by plain film.  3.  Prior partial amputation of great toe.        MRI: none this admission     Arterial studies:   US-PATSY single level bilateral 5/21/2021                     RIGHT      Waveform            Systolic BPs (mmHg)                              120           Brachial                                            Common Femoral                                            Posterior Tibial                                            Dorsalis Pedis                                            Peroneal                                            PATSY                                            TBI                           LEFT   Waveform        Systolic BPs (mmHg)                                            Brachial   Triphasic                                Common Femoral   Monophasic                 127           Posterior Tibial   Monophasic                 45            Dorsalis Pedis                                            Peroneal                              1.06           PATSY                                            TBI         Findings   Right-   Ankle pressure not obtained due to below the knee amputation.    Bandage over the common femoral artery.    Doppler waveform of the popliteal artery is of high amplitude and    triphasic.       Left-   Ankle-brachial index of the dorsalis pedis artery is severely reduced.    Ankle-brachial index of the posterior tibial artery is normal.    Doppler waveform of the common femoral artery is of high amplitude and    triphasic.    Doppler waveforms at the ankle are monophasic with moderate amplitude.       PPG's-   No flow       Bilateral arterial duplex scan was performed in accordance with lower    extremity arterial evaluation protocol - see separate report.        US-extremity artery lower bilateral 5/21/2021      RIGHT   Waveform        Peak Systolic Velocity (cm/s)                   Prox    Prox-Mid  Mid    Mid-Dist  Distal   Triphasic                         52                       CFA         Biphasic        55                                         PFA         Biphasic        44                65      24       56      SFA         Biphasic                          35                       POP         Monophasic      62                                         AT         Monophasic      51                                         PT         Not                                                        LISA   attained                    LEFT   Waveform        Peak Systolic Velocity (cm/s)                   Prox    Prox-Mid  Mid    Mid-Dist  Distal   Triphasic                         132                      CFA         Biphasic        43                                         PFA         Biphasic        50                62               42      SFA         Triphasic                         56                       POP         Monophasic      48                                 70      AT         Monophasic      37  "                                30      PT         Absent          0                                  54      LISA               FINDINGS   Right-   Calcific atherosclerotic plaque seen throughout the lower extremity.    Stenosis of the distal superficial femoral artery. Velocities are    consistent with 50-75% stenosis.    Proximal anterior and posterior tibial artery waveforms are monophasic.    Proximal peroneal artery was not visualized.       Left-   Calcific atherosclerotic plaque seen throughout the lower extremity.   Monophasic waveforms seen in the anterior and posterior tibial arteries.   Absent flow seen in the proximal peroneal artery.         EXAM:      /52   Pulse 89   Temp 36.3 °C (97.4 °F) (Temporal)   Resp 15   Ht 1.676 m (5' 6\")   Wt 92.5 kg (203 lb 14.8 oz)   SpO2 95%   BMI 32.91 kg/m²     Pedal Pulses: Unable to palpate left DP/PT pulses, soft monophasic DP/PT pulses via Doppler  Faintly palpable popliteal bilaterally  Sensation: Hypersensitive with light touch to left BKA stump      Wound :  Left BKA  Incision well approximated with sutures.  Mild edema, continues to improve  Mild scattered incisional necrosis throughout incision.  Hypersensitivity with light touch  No erythema, drainage, fluctuance, odor.  Hemovac insertion site appears to be well-healed no surrounding erythema, edema, drainage, fluctuance.    Left BKA medial        Left BKA distal        Left BKA lateral            Wound care done by APRN.    Left BKA stump: Cleanse normal saline or wound cleanser, pat dry.  Apply single layer Adaptic, gauze, secure with roll gauze and Hypafix tape.  Ace wrap for compression.  Change every 48 hours or as needed for saturation dislodgment            DIABETES MANAGEMENT:    Blood glucose:   Results from last 7 days   Lab Units 06/06/21  1250 06/06/21  0820 06/05/21  2036 06/05/21  1741 06/05/21  1324 06/05/21  0843 06/04/21  2300 06/04/21  1829   ACCU CHECK GLUCOSE 788 mg/dL 143* 99 " 147* 123* 107* 105* 135* 128*     A1c:   Lab Results   Component Value Date/Time    HBA1C 5.7 (H) 05/11/2021 06:30 AM            INFECTION MANAGEMENT:    Results from last 7 days   Lab Units 06/06/21  0720 06/05/21  0754 06/04/21  0601 06/03/21  0415 06/02/21  0515 06/01/21  0500   WBC 1501 K/uL 9.0 9.2 10.0 11.0* 12.6* 11.1*   PLATELET COUNT 1518 K/uL 394 373 373 443 343 361     Wound culture results:   Results     ** No results found for the last 168 hours. **                     ASSESSMENT/PLAN:   This is a 75-year-old male with a history of type 2 diabetes, PAD, ESRD on HD, right BKA, recent NSTEMI, EF 15-20%, left anterior tibial artery angioplasty, percutaneous thrombectomy of left peroneal artery, left peroneal artery angioplasty with Dr. Encinas 04/2021.    Patient is POD #9 s/p left BKA with Dr. Márquez.  Incision is well approximated with mild scattered incisional necrosis throughout incision.  There is hypersensitivity to his left BKA stump.  Edema continues to improve.  No erythema drainage fluctuance.  Patient pending discharge to [facility].  Wound care orders written and provided in transfer packet.      Wound care:   Left BKA stump: Cleanse normal saline or wound cleanser, pat dry.  Apply single layer Adaptic, gauze, secure with roll gauze and Hypafix tape.  Ace wrap for compression.  Change every 48 hours or as needed for saturation dislodgment    Labs/Imaging:  -COVID-19: not detected this admission on this admission 5/20/2021  -no further labs or imaging at this time      Vascular status:   -Faintly palpable popliteal pulses bilaterally  -Vascular surgery Dr. Encinas involved.  No plans for major revascularization surgery and no focal flow-limiting lesion was seen on duplex.  Vascular recommending amputation per LPS      Surgery:   None    Antibiotics:   -ID involved.  Now has signed off  Completed Zosyn IV 5/25/2021.    Weight Bearing Status:   -Non Weight bearing LLE    Offloading:   Foot bilateral  lower extremities with pillows.  Knee immobilizer to be ordered to prevent left knee contracture  -Orthotic company: [employer]    PT/OT :   Involved.  Last seen by PT/OT 6/4/2021      Diabetes Education:   -A1c 5.7% on 5/11/2021  Diabetes educator not involved at this time    - Implications of loss of protective sensation (LOPS) discussed with patient- including increased risk for amputation.  Advised to check feet at least daily, moisturize feet, and to always wear protective foot wear.   -avoid trimming own nails. See podiatrist or certified foot and nail RN  -keep blood sugars <150 for improved wound healing        DISCHARGE PLAN:    Disposition: Patient pending discharge to [facility].  Patient clear for discharge.  Wound care orders written and provided in transfer packet.      Follow-up: Orthopedics in 3 weeks for suture removal.      Discussed with: Ana Cristina palacios RN.    Please note that this dictation was created using voice recognition software. I have  worked with technical experts from [facility] to optimize the interface.  I have made every reasonable attempt to correct obvious errors, but there may be errors of grammar and possibly content that I did not discover before finalizing the note.    Jessica Blackmon, A.P.R.N.    If any questions or concerns, please contact LPS through voalte.

## 2021-06-09 NOTE — TELEPHONE ENCOUNTER
Received Disability paperwork from patients wife for the [facility] asking if we could complete it, stating she has been working with the [facility] for the last 500 days on it.  Went and spoke to Abbie Matthews regarding paperwork and she stated she would contact supervisor at [facility] to determine what needs to be done.   Will follow up in a few business days to determine what I need to complete.

## 2021-06-10 NOTE — TELEPHONE ENCOUNTER
Filled out paperwork to the best of my knowledge.   Spent 40 minutes on it, will have MINERV[employer] GROSSMAN fill out and sign upon his return to office.     Documents in [place] folder in [employer] folder at ANNIE MONTOYA desk.

## 2021-06-10 NOTE — TELEPHONE ENCOUNTER
Spoke to Abbie and she stated that the [facility] is recommending that the patients have their providers fill out the paperwork and pick it up to send back to them since they are so behind on these compensation claims within the [facility]. She told me she confirmed that we can fill it out even though it states [facility] on it.

## 2021-06-16 NOTE — TELEPHONE ENCOUNTER
Spoke to MINERVA GROSSMAN and he stated the patients physician at the [facility] needs to fill out the paperwork.  He did state to give her his last office visit note, last heart catheterization report and discharge summary.     Printed requested documents and placed in manila folder.    Sent patient a mychart message that she can come  the documents and they will be at the front podium.

## 2021-06-18 PROBLEM — J90 PLEURAL EFFUSION: Status: ACTIVE | Noted: 2021-01-01

## 2021-06-18 PROBLEM — I99.8 ISCHEMIA OF FINGER: Status: ACTIVE | Noted: 2021-01-01
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
## 2021-06-18 PROBLEM — S09.90XA HEAD TRAUMA, INITIAL ENCOUNTER: Status: ACTIVE | Noted: 2021-01-01

## 2021-06-18 NOTE — ED NOTES
Skin assessment completed. Changed pt's gown and  Bedding. q2 turn implemented. Pillow placed for comfort. Updated pt and wife with poc

## 2021-06-18 NOTE — ASSESSMENT & PLAN NOTE
#IDDM 2  #PAD s/p b/l BKA    Plan.  -Continue glargine 10 units nightly  -SSI  -Lispro insulin 3 units preprandial

## 2021-06-18 NOTE — ASSESSMENT & PLAN NOTE
#Ground-level fall complicated by  #Suspicion for right femoral neck fracture in the setting of  #Renal osteodystrophy- BMD     *CT was ordered, patient refused/declined to undergo test  -No further interventions at this time to address fracture  -Continue calcitriol 0.25 MCG daily

## 2021-06-18 NOTE — H&P
Hospital Medicine History & Physical Note    Date of Service  6/18/2021    Primary Care Physician  Patito Edgar M.D.    Consultants  Renal     Code Status  Prior    Chief Complaint  Chief Complaint   Patient presents with   • Fall   • Abrasion     hands and elbow       History of Presenting Illness  75 y.o. male who presented 6/18/2021 after a ground-level fall from his wheelchair onto his face.  [facility].  He has multiple medical comorbidities.  He has been receiving his usual Tuesday Thursday Saturday hemodialysis, sessions have been somewhat limited due to his hypotension.  Unfortunately, he appears to be somewhat dyspneic, tachypneic and hypoxemic requiring supplemental O2 at the time of my evaluation.  Does appear to be somewhat volume overloaded.  Patient received a CT scan of his head which was negative for bleed, it was felt prudent to admit to watch her overnight and see if additional dialysis sessions could be arranged.  Will repeat head CT if there are any neurological findings that are concerning, and will continue his home dose anticoagulation.    Review of Systems  All systems reviewed and negative except as noted per above.    Past Medical History   has a past medical history of Arrhythmia, Arthritis (12/29/2020), CAD (coronary artery disease), Chronic anticoagulation (3/26/2020), Chronic kidney disease (CKD), stage V ([facility]), Congestive heart failure ([facility]), Dental disorder (12/29/2020), Diabetes, Hemodialysis patient ([facility]), Hypertension, Kidney failure, Myocardial infarct ([facility]), Osteoarthritis, Peripheral neuropathy, Pneumonia, and Sleep apnea.    Surgical History   has a past surgical history that includes appendectomy; other orthopedic surgery; other cardiac surgery; toe amputation (Left, 5/17/2020); tenotomy (Left, 5/17/2020); toe amputation (Right, 11/16/2020); knee amputation below (Right, 12/31/2020); and knee amputation below  "(Left, 5/28/2021).     Family History  family history includes Alcohol/Drug in his father; Diabetes in his brother; Heart Disease in his mother; Thyroid in his son.     Social History   reports that he quit smoking about 7 years ago. His smoking use included cigarettes. He has a 55.00 pack-year smoking history. He has never used smokeless tobacco. He reports previous alcohol use. He reports that he does not use drugs.    Allergies  Allergies   Allergen Reactions   • Mircera [Methoxy Polyethylene Glycol-Epoetin Beta] Hives   • Other Drug      \"Opiods\" caused  hallucinations       Medications  Prior to Admission Medications   Prescriptions Last Dose Informant Patient Reported? Taking?   Guaifenesin 400 MG Tab 6/18/2021 at 1239 MAR from Other Facility Yes Yes   Sig: Take 400 mg by mouth in the morning, at noon, and at bedtime.   apixaban (ELIQUIS) 5mg Tab 6/18/2021 at 1239 MAR from Other Facility No No   Sig: Take 1 tablet by mouth 2 times a day. Indications: Thromboembolism secondary to Atrial Fibrillation   atorvastatin (LIPITOR) 40 MG Tab 6/17/2021 at 2058 MAR from Other Facility No No   Sig: Take 1 tablet by mouth at bedtime.   budesonide (PULMICORT) 0.5 MG/2ML Suspension 6/18/2021 at 1239 MAR from Other Facility Yes Yes   Sig: Take 500 mcg by nebulization 2 times a day.   calcitRIOL (ROCALTROL) 0.25 MCG Cap 6/18/2021 at 1239 MAR from Other Facility No No   Sig: Take 1 capsule by mouth every day.   carvedilol (COREG) 3.125 MG Tab 6/18/2021 at 1239 MAR from Other Facility Yes Yes   Sig: Take 3.125 mg by mouth 2 times a day.   clopidogrel (PLAVIX) 75 MG Tab 6/18/2021 at 1239 MAR from Other Facility No No   Sig: Take 1 tablet by mouth every day.   furosemide (LASIX) 20 MG Tab 6/18/2021 at 1239 MAR from Other Facility Yes Yes   Sig: Take 20 mg by mouth every day.   gabapentin (NEURONTIN) 300 MG Cap 6/18/2021 at 1239 MAR from Other Facility No No   Sig: Take 1 capsule by mouth every day.   guaiFENesin (ROBITUSSIN) 100 " MG/5ML Solution 6/12/2021 at 0129 MAR from Other Facility Yes Yes   Sig: Take 10 mL by mouth every 6 hours as needed.   insulin glargine (LANTUS SOLOSTAR) 100 UNIT/ML Solution Pen-injector injection 6/17/2021 at 2103 MAR from Other Facility No No   Sig: Inject 8 Units under the skin at bedtime for 30 days.   ipratropium-albuterol (DUONEB) 0.5-2.5 (3) MG/3ML nebulizer solution 6/16/2021 at 2112 MAR from Other Facility Yes Yes   Sig: Take 3 mL by nebulization 4 times a day.   levoFLOXacin (LEVAQUIN) 250 MG Tab 6/16/2021 at finished MAR from Other Facility Yes Yes   Sig: Take 250 mg by mouth every day. Taken for 6 days.   methimazole (TAPAZOLE) 5 MG Tab 6/18/2021 at 1239 MAR from Other Facility No No   Sig: Take 1 tablet by mouth 2 times a day.   methylPREDNISolone (MEDROL) 4 MG Tab 6/15/2021 at Discontinued MAR from Other Facility Yes Yes   Sig: Take 4-20 mg by mouth every day. This medication was D/C'ed 6/15/21.    Day 1:  2 tablets before breakfast, 1 tablet after lunch and after supper, 2 tablets at bedtime  Day 2:  1 tablet before breakfast, 1 tablet after lunch and after supper, 2 tablets at bedtime  Day 3:  1 tablet before breakfast and 1 tablet after lunch, after supper and at bedtime  Day 4:  1 tablet before breakfast, after lunch, and at bedtime  Day 5:  1 tablet before breakfast and at bedtime  Day 6:  1 tablet before breakfast   methylPREDNISolone (MEDROL) 4 MG Tab 6/18/2021 at 1239 MAR from Other Facility Yes Yes   Sig: Take 4 mg by mouth 2 times a day. Discontinued 6/18.   midodrine (PROAMATINE) 10 MG tablet 6/18/2021 at 1239 MAR from Other Facility No No   Sig: Take 1 tablet by mouth 3 times a day with meals.   Patient taking differently: Take 10 mg by mouth in the morning, at noon, and at bedtime.   omeprazole (PRILOSEC) 20 MG delayed-release capsule 6/18/2021 at 1239 MAR from Other Facility No No   Sig: Take 1 capsule by mouth 2 times a day.   Patient taking differently: Take 20 mg by mouth every day.    oxyCODONE-acetaminophen (PERCOCET) 5-325 MG Tab 6/8/2021 at 1457 MAR from Other Facility Yes Yes   Sig: Take 1 tablet by mouth every four hours as needed.   sevelamer carbonate (RENVELA) 800 MG Tab tablet 6/18/2021 at 1239 MAR from Other Facility No No   Sig: Take 2 Tablets by mouth 3 times a day with meals.   tamsulosin (FLOMAX) 0.4 MG capsule 6/17/2021 at 2058 MAR from Other Facility No No   Sig: Take 2 Capsules by mouth at bedtime.   traMADol (ULTRAM) 50 MG Tab 6/12/2021 at 1250 MAR from Other Facility Yes Yes   Sig: Take 25 mg by mouth every 6 hours as needed for Mild Pain.   traZODone (DESYREL) 50 MG Tab 6/17/2021 at 2058 MAR from Other Facility No No   Sig: Take 1 tablet by mouth at bedtime for 30 days.      Facility-Administered Medications: None       Physical Exam  Temp:  [35.9 °C (96.6 °F)] 35.9 °C (96.6 °F)  Pulse:  [77-79] 77  Resp:  [20] 20  BP: ()/(34-62) 90/45  SpO2:  [90 %-95 %] 95 %    General: No acute distress, family at bedside.   HEENT atraumatic, normocephalic, pupils equal round reactive to light  Neck: Moderate jugular venous distention  Chest: Respirations are tachypneic, decreased breath sounds on the right side  Cardiac: Physiologic S1 and S2  Abdomen: Soft, nontender, nondistended  Extremities: Without clubbing, cyanosis or edema.  Palpable thrill over the left fistula, ischemic changes noted to the left fingertip, new per family at bedside.  Bilateral BKA's.  Neuro: Cranial nerves II through XII are grossly intact.  Psych: No anxiety, judgement intact.          Laboratory:  Recent Labs     06/18/21  1407   WBC 9.1   RBC 2.38*   HEMOGLOBIN 8.1*   HEMATOCRIT 26.4*   .9*   MCH 34.0*   MCHC 30.7*   RDW 71.6*   PLATELETCT 271   MPV 9.3     Recent Labs     06/18/21  1407   SODIUM 139   POTASSIUM 4.7   CHLORIDE 96   CO2 32   GLUCOSE 102*   BUN 41*   CREATININE 4.78*   CALCIUM 9.2     Recent Labs     06/18/21  1407   ALTSGPT 13   ASTSGOT 14   ALKPHOSPHAT 133*   TBILIRUBIN 0.3    GLUCOSE 102*     Recent Labs     06/18/21  1407   APTT 37.3*   INR 2.21*     Recent Labs     06/18/21  1407   NTPROBNP >99352*         Recent Labs     06/18/21  1407   TROPONINT 276*       Imaging:  DX-HIP-COMPLETE - UNILATERAL 2+ RIGHT   Final Result      Demineralization and degenerative change without acute osseous abnormality.      DX-HAND 3+ RIGHT   Final Result      No acute fracture identified. If pain persists, recommend repeat imaging in 7 days.      DX-HAND 3+ LEFT   Final Result      No acute fracture identified. If pain persists, recommend repeat imaging in 7 days.      DX-PELVIS-1 OR 2 VIEWS   Final Result      Irregularity of the subcapital right femoral neck which could represent a nondisplaced fracture. This is suboptimally visualized on the provided single AP view. Additional views or CT could be obtained for further evaluation/confirmation.      DX-CHEST-PORTABLE (1 VIEW)   Final Result      1.  Increasing right pleural effusion from the prior exam. Adjacent airspace disease may represent atelectasis or pneumonia.      2.  No significant change in interstitial opacification in the left lung.      CT-HEAD W/O   Final Result      1.  No evidence of acute territorial infarct, intracranial hemorrhage or mass lesion.   2.  Moderate diffuse cerebral substance loss.   3.  Mild microangiopathic ischemic change versus demyelination or gliosis.          Cardiology:  1. Twelve-lead EKG discloses normal sinus rhythm at 76 ventricular beats per minute, QTC is somewhat prolonged at 482 ms. No ST segment elevations or deviations concerning for ischemia are seen though this is somewhat limited by the presence of a right bundle branch block. MA interval is somewhat prolonged at 196 ms. PVCs are noted. Of note, read as per my interpretation of images of cardiology at the time of this dictation.      Assessment/Plan:  I anticipate this patient will require at least two midnights for appropriate medical management,  necessitating inpatient admission.    Head trauma  Assessment & Plan  Patient will be watched overnight for neurological changes, I will continue his home dose anticoagulation, repeat head CT if any neurological changes.    Acute on chronic systolic congestive heart failure (HCC)- (present on admission)  Assessment & Plan  Likely related to suboptimal dialysis over the past few sessions.  This has been limited by hypotension.  Will be discussed with nephrology, I think he would benefit from an extra session today, and then his usual Tuesday Thursday Saturday dialysis session tomorrow.  BNP undetectably high,     Pleural effusion  Assessment & Plan  Plan for IR guided thoracentesis. Will review with them to see if eliquist will need to be held first.      Hypotension- (present on admission)  Assessment & Plan  Continue home dose midodrine. Hold parameters on his home dose carvedilol. And home dose furosemide.    Type 2 diabetes mellitus with kidney complication, with long-term current use of insulin (HCC)- (present on admission)  Assessment & Plan  Insulins per my orders.     Ischemia of finger  Assessment & Plan  This is distal to the fistula that was revised few weeks ago. Changes were discussed with Dr. Honeycutt at the vascular surgery service who performed the original fistula revision. He is aware of the changes, does not feel there are any acute interventions warranted.  No indications for formal consult at this juncture.

## 2021-06-18 NOTE — ASSESSMENT & PLAN NOTE
#Acute hypoxic respiratory failure secondary to  #Acute pleural effusion secondary to  #Hypervolemia in the setting of  #Acute on chronic CHF (reduced ejection fraction 30% 5/12/2021 TTE)    *Status post ultrafiltration in ED with symptomatic improvement  *Status post thoracentesis in ED; 1 L removed, fluid transudative.  Reports symptomatic improvement      Improving volume status, no orthopnea.     Plan  -continue carvedilol 3.125mg  -continue atorvastatin 40mg qdaily  -D/C furosemide, no yield  -Cont iHD per nephrology    -1500 mL fluid restriction  -Low-sodium renal diet

## 2021-06-18 NOTE — ED NOTES
"Med Rec completed: per MAR from [facility].     Pt was on Medrol dosepak for four days, and was then changed to 4mg twice daily, which was completed today, 6/18/21.     No ORAL antibiotics in last 14 days    Preferred Pharmacy: [facility] P: [phone]    Pt confirmed following allergies:  Allergies   Allergen Reactions   • Mircera [Methoxy Polyethylene Glycol-Epoetin Beta] Hives   • Other Drug      \"Opiods\" caused  hallucinations      Pt's home medications:   Medication Sig   • traMADol (ULTRAM) 50 MG Tab Take 25 mg by mouth every 6 hours as needed for Mild Pain.   • guaiFENesin (ROBITUSSIN) 100 MG/5ML Solution Take 10 mL by mouth every 6 hours as needed.   • ipratropium-albuterol (DUONEB) 0.5-2.5 (3) MG/3ML nebulizer solution Take 3 mL by nebulization 4 times a day.   • levoFLOXacin (LEVAQUIN) 250 MG Tab Take 250 mg by mouth every day. Taken for 6 days.   • methylPREDNISolone (MEDROL) 4 MG Tab Take 4-20 mg by mouth every day. This medication was D/C'ed 6/15/21.    Day 1:  2 tablets before breakfast, 1 tablet after lunch and after supper, 2 tablets at bedtime  Day 2:  1 tablet before breakfast, 1 tablet after lunch and after supper, 2 tablets at bedtime  Day 3:  1 tablet before breakfast and 1 tablet after lunch, after supper and at bedtime  Day 4:  1 tablet before breakfast, after lunch, and at bedtime  Day 5:  1 tablet before breakfast and at bedtime  Day 6:  1 tablet before breakfast   • methylPREDNISolone (MEDROL) 4 MG Tab Take 4 mg by mouth 2 times a day. Discontinued 6/18.   • oxyCODONE-acetaminophen (PERCOCET) 5-325 MG Tab Take 1 tablet by mouth every four hours as needed.   • Guaifenesin 400 MG Tab Take 400 mg by mouth in the morning, at noon, and at bedtime.   • furosemide (LASIX) 20 MG Tab Take 20 mg by mouth every day.   • budesonide (PULMICORT) 0.5 MG/2ML Suspension Take 500 mcg by nebulization 2 times a day.   • carvedilol (COREG) 3.125 MG Tab Take 3.125 mg by mouth 2 times a day. "   • traZODone (DESYREL) 50 MG Tab Take 1 tablet by mouth at bedtime for 30 days.   • insulin glargine (LANTUS SOLOSTAR) 100 UNIT/ML Solution Pen-injector injection Inject 8 Units under the skin at bedtime for 30 days.   • apixaban (ELIQUIS) 5mg Tab Take 1 tablet by mouth 2 times a day. Indications: Thromboembolism secondary to Atrial Fibrillation   • omeprazole (PRILOSEC) 20 MG delayed-release capsule Take 1 capsule by mouth 2 times a day. (Patient taking differently: Take 20 mg by mouth every day.)   • atorvastatin (LIPITOR) 40 MG Tab Take 1 tablet by mouth at bedtime.   • calcitRIOL (ROCALTROL) 0.25 MCG Cap Take 1 capsule by mouth every day.   • gabapentin (NEURONTIN) 300 MG Cap Take 1 capsule by mouth every day.   • methimazole (TAPAZOLE) 5 MG Tab Take 1 tablet by mouth 2 times a day.   • tamsulosin (FLOMAX) 0.4 MG capsule Take 2 Capsules by mouth at bedtime.   • sevelamer carbonate (RENVELA) 800 MG Tab tablet Take 2 Tablets by mouth 3 times a day with meals.   • midodrine (PROAMATINE) 10 MG tablet Take 1 tablet by mouth 3 times a day with meals. (Patient taking differently: Take 10 mg by mouth in the morning, at noon, and at bedtime.)   • clopidogrel (PLAVIX) 75 MG Tab Take 1 tablet by mouth every day.

## 2021-06-18 NOTE — ED NOTES
"Chief Complaint   Patient presents with   • Fall   • Abrasion     hands and elbow     Pt bib [facility], unwitnessed glf . Pt said that he felt \"sensation of going down\" . fsbs 134. Pt unable to recall what happened, denies head ,neck pain. Pt's bp 74/34, paiged erp.   at bedside  Code tbi activated  "

## 2021-06-18 NOTE — ASSESSMENT & PLAN NOTE
S/p thoracentesis 6/18.  Repeat CXR 6/21 worsened appearance of pleural effusion    -Repeat thoracentesis at the bedside by IR 6/23/2021

## 2021-06-18 NOTE — ED NOTES
from Lab called with critical result of troponin 276 at 1502. Critical lab result read back to .   Dr. Sears notified of critical lab result at 1502.  Critical lab result read back by Dr. Sears.

## 2021-06-18 NOTE — ASSESSMENT & PLAN NOTE
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     Plan  -Status post revision by vascular surgery

## 2021-06-18 NOTE — ASSESSMENT & PLAN NOTE
Continue home dose midodrine. Hold parameters on his home dose carvedilol. And home dose furosemide.

## 2021-06-18 NOTE — ED NOTES
Bruising left upper arm dialysis access area noted. Left ring finger, black finger pad, poor capillary refill fingers. Informed .   Per pt they had to revised his dialysis access and has been having circulation problem to his hand.  Abrasions to hands and arm cleaned by ed tech. Removed left bka dressing, ERP assessed site.

## 2021-06-19 NOTE — ED PROVIDER NOTES
ED Provider Note    CHIEF COMPLAINT  Chief Complaint   Patient presents with   • Fall   • Abrasion     hands and elbow       HPI  Luis Sepulveda is a 75 y.o. male who presents to the emergency department brought in by ambulance after a report of a ground-level fall.  The patient does not recall what happened but says that he was on the ground he feels like he injured both hands and he is having some discomfort in the tailbone area after the fall and he is worried that he injured his recent BKA stump site on the left leg.  The patient has a very complex medical history including dialysis dependent end-stage renal disease, congestive heart failure with an EF of 15 to 20%.  The patient is on Eliquis.  He was recently diagnosed with pneumonia and is taken a 10-day course of Levaquin.  The patient's wife says that he has been a lot more confused than usual over the last couple of weeks and has been having some difficulty breathing which seems better following his dialysis but worse the next day.    REVIEW OF SYSTEMS no fever or chills no neck or spine pain.  He does not think that he hit his head or lost consciousness but he does not fully recall exactly what happened either.  All other systems negative    PAST MEDICAL HISTORY  Past Medical History:   Diagnosis Date   • Arrhythmia     hx a fib   • Arthritis 12/29/2020    knees, ankles, back   • CAD (coronary artery disease)     stents   • Chronic anticoagulation 3/26/2020   • Chronic kidney disease (CKD), stage V (HCC)    • Congestive heart failure (HCC)     hx of   • Dental disorder 12/29/2020    partial upper   • Diabetes    • Hemodialysis patient (HCC)     Tuesday, Thursday, Saturday   • Hypertension    • Kidney failure    • Myocardial infarct (HCC)     ? 2019    • Osteoarthritis    • Peripheral neuropathy    • Pneumonia     per hx   • Sleep apnea     does not use cpap anymore       FAMILY HISTORY  Family History   Problem Relation Age of Onset   • Heart  Disease Mother    • Alcohol/Drug Father    • Thyroid Son    • Diabetes Brother    • Hypertension Neg Hx    • Hyperlipidemia Neg Hx        SOCIAL HISTORY  Social History     Socioeconomic History   • Marital status:      Spouse name: Not on file   • Number of children: Not on file   • Years of education: Not on file   • Highest education level: Some college, no degree   Occupational History   • Not on file   Tobacco Use   • Smoking status: Former Smoker     Packs/day: 1.00     Years: 55.00     Pack years: 55.00     Types: Cigarettes     Quit date: 2014     Years since quittin.4   • Smokeless tobacco: Never Used   Vaping Use   • Vaping Use: Never used   Substance and Sexual Activity   • Alcohol use: Not Currently   • Drug use: No   • Sexual activity: Not on file   Other Topics Concern   • Not on file   Social History Narrative   • Not on file     Social Determinants of Health     Financial Resource Strain:    • Difficulty of Paying Living Expenses:    Food Insecurity:    • Worried About Running Out of Food in the Last Year:    • Ran Out of Food in the Last Year:    Transportation Needs: Unmet Transportation Needs   • Lack of Transportation (Medical): Yes   • Lack of Transportation (Non-Medical): Yes   Physical Activity: Inactive   • Days of Exercise per Week: 0 days   • Minutes of Exercise per Session: 0 min   Stress: Stress Concern Present   • Feeling of Stress : To some extent   Social Connections: Socially Isolated   • Frequency of Communication with Friends and Family: More than three times a week   • Frequency of Social Gatherings with Friends and Family: More than three times a week   • Attends [employer] Services: Never   • Active Member of Clubs or Organizations: No   • Attends Club or Organization Meetings: Never   • Marital Status:    Intimate Partner Violence:    • Fear of Current or Ex-Partner:    • Emotionally Abused:    • Physically Abused:    • Sexually Abused:        SURGICAL  HISTORY  Past Surgical History:   Procedure Laterality Date   • KNEE AMPUTATION BELOW Left 5/28/2021    Procedure: AMPUTATION, BELOW KNEE.;  Surgeon: Jarett Márquez M.D.;  Location: SURGERY [facility];  Service: Orthopedics   • KNEE AMPUTATION BELOW Right 12/31/2020    Procedure: AMPUTATION, BELOW KNEE ...;  Surgeon: Leobardo Lynn M.D.;  Location: SURGERY [facility];  Service: Orthopedics   • TOE AMPUTATION Right 11/16/2020    Procedure: AMPUTATION, TOE GREAT;  Surgeon: Leobardo Lynn M.D.;  Location: SURGERY [facility];  Service: Orthopedics   • TOE AMPUTATION Left 5/17/2020    Procedure: AMPUTATION, TOE GREAT;  Surgeon: Leobardo Lynn M.D.;  Location: [facility];  Service: Orthopedics   • TENOTOMY Left 5/17/2020    Procedure: FLEXOR TENOTOMIES, TWO-FIVE TOES;  Surgeon: Leobardo Lynn M.D.;  Location: SURGERY [facility];  Service: Orthopedics   • APPENDECTOMY     • OTHER CARDIAC SURGERY      stents x1   • OTHER ORTHOPEDIC SURGERY      knee surgery       CURRENT MEDICATIONS  Home Medications     Reviewed by Vamsi Sage PhT (Pharmacy Tech) on 06/18/21 at 1536  Med List Status: Complete   Medication Last Dose Status   apixaban (ELIQUIS) 5mg Tab 6/18/2021 Active   atorvastatin (LIPITOR) 40 MG Tab 6/17/2021 Active   budesonide (PULMICORT) 0.5 MG/2ML Suspension 6/18/2021 Active   calcitRIOL (ROCALTROL) 0.25 MCG Cap 6/18/2021 Active   carvedilol (COREG) 3.125 MG Tab 6/18/2021 Active   clopidogrel (PLAVIX) 75 MG Tab 6/18/2021 Active   furosemide (LASIX) 20 MG Tab 6/18/2021 Active   gabapentin (NEURONTIN) 300 MG Cap 6/18/2021 Active   guaiFENesin (ROBITUSSIN) 100 MG/5ML Solution 6/12/2021 Active   Guaifenesin 400 MG Tab 6/18/2021 Active   insulin glargine (LANTUS SOLOSTAR) 100 UNIT/ML Solution Pen-injector injection 6/17/2021 Active   ipratropium-albuterol (DUONEB) 0.5-2.5 (3) MG/3ML nebulizer solution 6/16/2021 Active   levoFLOXacin (LEVAQUIN) 250 MG Tab 6/16/2021 Active  "  methimazole (TAPAZOLE) 5 MG Tab 6/18/2021 Active   methylPREDNISolone (MEDROL) 4 MG Tab 6/15/2021 Active   methylPREDNISolone (MEDROL) 4 MG Tab 6/18/2021 Active   midodrine (PROAMATINE) 10 MG tablet 6/18/2021 Active   omeprazole (PRILOSEC) 20 MG delayed-release capsule 6/18/2021 Active   oxyCODONE-acetaminophen (PERCOCET) 5-325 MG Tab 6/8/2021 Active   sevelamer carbonate (RENVELA) 800 MG Tab tablet 6/18/2021 Active   tamsulosin (FLOMAX) 0.4 MG capsule 6/17/2021 Active   traMADol (ULTRAM) 50 MG Tab 6/12/2021 Active   traZODone (DESYREL) 50 MG Tab 6/17/2021 Active                ALLERGIES  Allergies   Allergen Reactions   • Mircera [Methoxy Polyethylene Glycol-Epoetin Beta] Hives   • Other Drug      \"Opiods\" caused  hallucinations       PHYSICAL EXAM  VITAL SIGNS: /57   Pulse 96   Temp 35.9 °C (96.6 °F) (Oral)   Resp 20   Ht 1.676 m (5' 6\")   Wt 77.6 kg (171 lb)   SpO2 89%   BMI 27.60 kg/m²    Oxygen saturation is interpreted as adequate on supplemental oxygen by nasal cannula  Constitutional: The patient is awake verbal he is not a great historian but does not seem distressed.  HENT: See any obvious marks or contusion to the head  Eyes: No erythema discharge or jaundice  Neck: C-spine nontender  Cardiovascular: Regular rate and rhythm  Lungs: Dense crackles throughout both lung fields  Abdomen/Back: Soft nontender nondistended no rebound guarding or peritoneal findings, no thoracic or lumbar tenderness, no right hip tenderness.  Skin: Warm and dry  Musculoskeletal: There is an AV fistula in the left upper arm which has a large bruise around it and the patient says that he recently had a procedure done on his fistula and it has been bruised since then.  Additionally there are a couple of fingertips on the left hand that have what look like subacute wounds and actually the pad of one of the fingers looks necrotic the patient says that his doctor told him this has something to do with his AV fistula and " these findings have been there for a while they are not acute after the fall.  There is an abrasion on the right hand and some mild tenderness of the fingers but no definite deformity the patient has bilateral BKA amputations the left one has a fresh surgical bandage so we took down the bandage and the wound actually looks fine I do not see any evidence of dehiscence or acute infection or injury  Neurologic: Awake verbal somewhat confused about his medical history and details of today's event but able to answer basic questions    Laboratory  CBC shows white blood cell count of 9.1 hemoglobin shows a low value of 8.1 basic metabolic panel shows a elevated BUN of 41 and an elevated creatinine of 4.78 consistent with his history of dialysis dependent renal failure.  The patient's troponin is elevated at 276 and have looked back in the medical records and the patient has had multiple troponins as high as 600 and 500 recently so I think this is likely chronic and related to his renal failure.  The BNP is very high at greater than 35,000 the INR is elevated at 2.21.  Lactic acid level is normal at 0.8    EKG interpretation  Twelve-lead EKG shows sinus rhythm 76 bpm there is a left axis deviation there are PVCs on the tracing there is no pathologic ST elevation    Radiology  DX-HIP-COMPLETE - UNILATERAL 2+ RIGHT   Final Result      Demineralization and degenerative change without acute osseous abnormality.      DX-HAND 3+ RIGHT   Final Result      No acute fracture identified. If pain persists, recommend repeat imaging in 7 days.      DX-HAND 3+ LEFT   Final Result      No acute fracture identified. If pain persists, recommend repeat imaging in 7 days.      DX-PELVIS-1 OR 2 VIEWS   Final Result      Irregularity of the subcapital right femoral neck which could represent a nondisplaced fracture. This is suboptimally visualized on the provided single AP view. Additional views or CT could be obtained for further  evaluation/confirmation.      DX-CHEST-PORTABLE (1 VIEW)   Final Result      1.  Increasing right pleural effusion from the prior exam. Adjacent airspace disease may represent atelectasis or pneumonia.      2.  No significant change in interstitial opacification in the left lung.      CT-HEAD W/O   Final Result      1.  No evidence of acute territorial infarct, intracranial hemorrhage or mass lesion.   2.  Moderate diffuse cerebral substance loss.   3.  Mild microangiopathic ischemic change versus demyelination or gliosis.      US-THORACENTESIS PUNCTURE RIGHT    (Results Pending)     MEDICAL DECISION MAKING and DISPOSITION  In the emergency department an IV was established the patient was placed on oxygen by nasal cannula and on the cardiac monitor.  There was an initial low blood pressure reading but multiple repeat measurements show adequate blood pressure.  The patient does have some crackles in both lung fields but when he is sitting up he does not appear to be in respiratory distress.  His oxygen saturation is easily corrected with nasal cannula oxygen.  I have reviewed the case with the hospitalist and the patient is referred to the hospitalist service for further evaluation and treatment.  I have reviewed the findings thus far available with the patient and his wife.    IMPRESSION  1.  Congestive heart failure   2.  Chronic renal failure requiring dialysis  3.  Worsening of chronic right pleural effusion  4.  Evaluation after a fall         Electronically signed by: Miguel Sears M.D., 6/18/2021 6:52 PM

## 2021-06-19 NOTE — CARE PLAN
The patient is Watcher - Medium risk of patient condition declining or worsening         Progress made toward(s) clinical / shift goals:  Wound consult and protocol ordered. Q2h turns. Fall precautions in place.      Problem: Skin Integrity  Goal: Skin integrity is maintained or improved  Outcome: Progressing     Problem: Fall Risk  Goal: Patient will remain free from falls  Outcome: Progressing

## 2021-06-19 NOTE — CONSULTS
DATE OF SERVICE:  06/18/2021     NEPHROLOGY CONSULTATION     REASON FOR CONSULTATION:  For evaluation and management of end-stage renal   disease and volume overload.     REQUESTING PHYSICIAN:  From [facility] ER.     HISTORY OF PRESENT ILLNESS:  The patient is a 75-year-old gentleman with   history of end-stage renal disease on hemodialysis Tuesday, Thursday and   Saturday at [facility], presented to [facility] with   complaints of dyspnea, tachypnea and hypoxia requiring supplemental oxygen.    He normally resides at a skilled nursing facility.  The patient's last   dialysis was on Thursday.  He had a full treatment and it was uneventful, but   prior to that the last two treatments were cut short secondary to hypotension.    Chest x-ray in the emergency room here shows a large right pleural effusion.    The patient states that he has been coughing with productive sputum over the   past several days, getting progressively worse.  He has no fevers or chills.    Nephrology was asked to see him for his ESRD and dialysis management and   ultrafiltration for volume overload.     REVIEW OF SYSTEMS:  Unable to fully assess given that he is short of breath   and unable to give me full history.  He is uncomfortable.     PAST MEDICAL HISTORY:  1.  ESRD, on dialysis Tuesday, Thursday and Saturday.  2.  Chronic hypotension.  3.  Anemia of chronic kidney disease.  4.  Renal osteodystrophy.  5.  Congestive heart failure.  6.  Peripheral vascular disease.  7.  AFib.     PAST SURGICAL HISTORY:  AV fistula creation and right BKA.     FAMILY HISTORY:  Reviewed and noncontributory to current illness.     SOCIAL HISTORY:  Distant tobacco abuse, distant alcohol use and no current   illicit drugs.     CURRENT MEDICATIONS:  Include Eliquis, Lipitor, Pulmicort, Rocaltrol, Coreg,   Plavix, Lasix, Neurontin, Robitussin, Lantus, DuoNeb, Levaquin, Tapazole,   Medrol, ProAmatine, Prilosec, Percocet,  Flomax, trazodone and Ultram.     PHYSICAL EXAMINATION:  VITAL SIGNS:  Revealing blood pressure of 90/45 with a pulse of 77,   temperature 35.9, satting 95% on supplemental O2.  GENERAL:  He is in no acute hemodynamic distress.  Wife is at the bedside.  HEENT:  Atraumatic, normocephalic.  Pupils are equal and round, reactive to   light.  NECK:  Moderate jugular venous distention noted.  Supple.  CHEST:  Positive tachypnea.  Decreased breath sounds in the right side.    Rhonchorous on the left.  CARDIAC:  S1, S2 heard.  ABDOMEN:  Soft, nontender, nondistended.  Bowel sounds are present.  EXTREMITIES:  Without any evidence of cyanosis, clubbing or edema.  He has a   palpable thrill and bruit in the left upper extremity.  He has bilateral BKAs.  NEUROLOGIC:  Cranial nerves are intact.  PSYCHIATRIC:  No anxiety.  Judgment is intact.     LABORATORY DATA:  White count 9.1, hemoglobin 8.1, platelet count of 271,000.   Sodium 139, potassium 4.7, chloride 96, bicarbonate 32, glucose 102, BUN and   creatinine 41/4.7, calcium 9.2.  BNP is over 35,000.     DIAGNOSTIC DATA:  Chest x-ray shows increased right pleural effusion from   prior exam, adjacent airspace disease, may represent atelectasis or pneumonia.    No significant change in interstitial opacification in the left lung.     ASSESSMENT:  1.  End-stage renal disease, on hemodialysis Tuesday, Thursday and Saturday at   [facility].  2.  Chronic hypotension.  3.  Peripheral vascular disease.  4.  Volume overload/shortness of breath/pleural effusion on the right.  5.  Cardiomyopathy with an ejection fraction of 15-20%.  6.  History of 80% ostial LAD in-stent restenosis.  7.  History of steal syndrome, status post DRI.  8.  Atrial fibrillation.  9.  Diabetes mellitus.  10.  Hyperthyroidism.  11.  History of anemia of chronic kidney disease.     PLAN:  We will arrange for pure ultrafiltration today for 2-1/2 hours and try   to remove 2-3 liters as blood pressure  tolerates.  We will put him back on his   regular Tuesday, Thursday, Saturday schedule dialysis tomorrow.  Avoid   nephrotoxins.  Renal dose medications as necessary.  He will need   thoracentesis to help relieve his dyspnea.  We will not use heparin with   ultrafiltration today.  Place him on a renal low sodium diabetic diet.     Thank you for allowing us to care for the patient.  We will follow him closely   with you.        ______________________________  MD MISSY SILVER/ARIELLA/GEOVANNY    DD:  06/18/2021 17:46  DT:  06/18/2021 18:20    Job#:  [id number]

## 2021-06-19 NOTE — PROGRESS NOTES
[facility] Nephrology Daily Progress Note    Date of Service  6/19/2021    Chief Complaint  The patient is a 75-year-old gentleman with history of end-stage renal disease on hemodialysis Tuesday, Thursday and Saturday at [facility], presented to [facility] with complaints of dyspnea, tachypnea and hypoxia requiring supplemental oxygen.  He normally resides at a skilled nursing facility.  The patient's last dialysis was on Thursday.  He had a full treatment and it was uneventful, but   prior to that the last two treatments were cut short secondary to hypotension.  Chest x-ray in the emergency room here shows a large right pleural effusion.  The patient states that he has been coughing with productive sputum over the past several days, getting progressively worse.  He has no fevers or chills.  Nephrology was asked to see him for his ESRD and dialysis management and ultrafiltration for volume overload.    Interval Problem Update  6/19 - S/p PUF yesterday with net UF of 3 L. He had thoracentesis yesterday as well with 1000 mL drained. He feels better. HD today.    Review of Systems  Review of Systems   Constitutional: Negative.    HENT: Negative.    Eyes: Negative.    Respiratory: Negative.    Cardiovascular: Negative.    Gastrointestinal: Negative.    Musculoskeletal: Negative.    Skin: Negative.    Neurological: Negative.    Endo/Heme/Allergies: Negative.    Psychiatric/Behavioral: Negative.         Physical Exam  Temp:  [35.9 °C (96.6 °F)-36.5 °C (97.7 °F)] 36.5 °C (97.7 °F)  Pulse:  [] 76  Resp:  [14-30] 18  BP: ()/(34-75) 89/37  SpO2:  [89 %-98 %] 92 %    Physical Exam  GENERAL:  He is in no acute hemodynamic distress.  Wife is at the bedside.  HEENT:  Atraumatic, normocephalic.  Pupils are equal and round, reactive to   light.  NECK:  Moderate jugular venous distention noted.  Supple.  CHEST:  some fine crackles at the base bilateral    CARDIAC:  S1, S2 heard.  ABDOMEN:  Soft,  nontender, nondistended.  Bowel sounds are present.  EXTREMITIES:  Without any evidence of cyanosis, clubbing or edema.  He has a   palpable thrill and bruit in the left upper extremity.  He has bilateral BKAs.  NEUROLOGIC:  Cranial nerves are intact.  PSYCHIATRIC:  No anxiety.  Judgment is intact.    Fluids    Intake/Output Summary (Last 24 hours) at 6/19/2021 1230  Last data filed at 6/19/2021 0400  Gross per 24 hour   Intake 740 ml   Output 3500 ml   Net -2760 ml       Laboratory  Recent Labs     06/18/21  1407   WBC 9.1   RBC 2.38*   HEMOGLOBIN 8.1*   HEMATOCRIT 26.4*   .9*   MCH 34.0*   MCHC 30.7*   RDW 71.6*   PLATELETCT 271   MPV 9.3     Recent Labs     06/18/21  1407   SODIUM 139   POTASSIUM 4.7   CHLORIDE 96   CO2 32   GLUCOSE 102*   BUN 41*   CREATININE 4.78*   CALCIUM 9.2     Recent Labs     06/18/21  1407   APTT 37.3*   INR 2.21*     Recent Labs     06/18/21  1407   NTPROBNP >69271*            ASSESSMENT:  1.  End-stage renal disease, on hemodialysis Tuesday, Thursday and Saturday at   [facility].  2.  Chronic hypotension.  3.  Peripheral vascular disease.  4.  Volume overload/shortness of breath/pleural effusion on the right.  5.  Cardiomyopathy with an ejection fraction of 15-20%.  6.  History of 80% ostial LAD in-stent restenosis.  7.  History of steal syndrome, status post DRI.  8.  Atrial fibrillation.  9.  Diabetes mellitus.  10.  Hyperthyroidism.  11.  History of anemia of chronic kidney disease.     PLAN:    -HD today per his TTS schedule  -Avoid nephrotoxins.    -Renal dose medications as necessary.    -Renal low sodium diabetic diet.

## 2021-06-19 NOTE — CARE PLAN
The patient is Watcher - Medium risk of patient condition declining or worsening         Progress made toward(s) clinical / shift goals:    Problem: Knowledge Deficit - Standard  Goal: Patient and family/care givers will demonstrate understanding of plan of care, disease process/condition, diagnostic tests and medications  Outcome: Progressing     Problem: Fall Risk  Goal: Patient will remain free from falls  Outcome: Progressing       Patient is not progressing towards the following goals:NA

## 2021-06-19 NOTE — PROGRESS NOTES
MS: SR 68-81 R PVC, couplets, trigeminy, and PACs  .17/.14/.37  Brief conversion to Afib rate of 99, then converted back to SR.

## 2021-06-19 NOTE — PROGRESS NOTES
4 Eyes Skin Assessment Completed by JAN Mai and JAN Keane.    Head WDL  Ears WDL  Nose WDL  Mouth WDL  Neck WDL  Breast/Chest WDL  Shoulder Blades WDL  Spine WDL  (R) Arm/Elbow/Hand Bruising; Skin tears to ring and middle fingers  (L) Arm/Elbow/Hand Bruising; wounds to 2nd, 3rd, and 4th fingers including necrotic area to ring finger; AVF WILL  Abdomen Scar  Groin WDL  Scrotum/Coccyx/Buttocks Redness and Blanching; small open area to coccyx  (R) Leg BKA; redness and blanching  (L) Leg recent BKA- dressing in place C/D/I          Devices In Places Tele Box and Nasal Cannula      Interventions In Place Gray Ear Foams, Sacral Mepilex, Pillows, Q2 Turns, Barrier Cream, Heels Loaded W/Pillows and Pressure Redistribution Mattress    Possible Skin Injury Yes    Pictures Uploaded Into Epic Yes  Wound Consult Placed Yes  RN Wound Prevention Protocol Ordered Yes

## 2021-06-19 NOTE — PROGRESS NOTES
[facility] Progress Note      STAT HD ordered by Dr. Curtis. Treatment started at 2002 and ended at 2232.     Total Net UF 3000mL.    Pt tolerated treatment well with no issues. See paper flow sheet for details. Cannulation needles taken out, held pressure for 10 min and placed gauze dressing with no bleeding. WILL AVF + for bruit/thrill. Report given to REGAN Chowdary RN.

## 2021-06-19 NOTE — PROGRESS NOTES
Hemodialysis ordered by Dr. Curtis. Treatment started at 1044 and ended at 1344. Pt stable, vss, no c/o post tx. See flow sheets for details. Net UF 2.1 L. Reported to KENNY Hodges RN. Lt ua avf dressing clean, dry, intact.

## 2021-06-20 PROBLEM — I95.9 HYPOTENSION: Status: RESOLVED | Noted: 2019-11-28 | Resolved: 2021-01-01

## 2021-06-20 NOTE — ASSESSMENT & PLAN NOTE
#ESRD on HD    Stable. At sinus [facility]    Plan  -continue carvediolol 3.125mg  -continue apixaban 5 mg BID per pharmacy dosing.

## 2021-06-20 NOTE — CARE PLAN
The patient is Watcher - Medium risk of patient condition declining or worsening    Shift Goals  Clinical Goals: diuresis, wean off 02   Patient Goals: rest, comfort   Family Goals: NA    Progress made toward(s) clinical / shift goals: MD notified of patient's breathe sounds, one time dose of lasix ordered.  Pt. Educated and encouraged to use incentive spirometer.     Problem: Knowledge Deficit - Standard  Goal: Patient and family/care givers will demonstrate understanding of plan of care, disease process/condition, diagnostic tests and medications  Outcome: Progressing     Patient is not progressing towards the following goals:    Problem: Respiratory  Goal: Patient will achieve/maintain optimum respiratory ventilation and gas exchange  Outcome: Not Progressing   Oxygen turned up to 3 Liters to maintain O2 saturation >90%.    Problem: Fluid Volume  Goal: Fluid volume balance will be maintained  Outcome: Not Progressing     Problem: Urinary Elimination  Goal: Establish and maintain regular urinary output  Outcome: Not Progressing   Pt. Given an additional dose of Lasix. No urine produced during 12 hour shift.

## 2021-06-20 NOTE — PROGRESS NOTES
I, Waylon Krishna D.O., have personally seen and examined the patient and discussed the management with the resident.   I performed a substantial portion of the EM visit face-to-face on the same date of service as the resident note.  I reviewed the resident's note and agree with the documented findings and plan of care, amended as necessary.     Date of Service 06/19/21:  ---  75M p/w GLF from wheelchair at SNF (resident).    Has hx of ESRD + iHD (T,Th,Sa) and recent sessions have been limited by hypotension.  ?volume overload on ED Admission    Increased freq of HD on admission, now back to regular schedule.  Cont to monitor volume status, apprec nephro.  Chronically ill, low baseline function over past several months, slowly seems to be deteriorating, hopefully can change trajectory, though prognosis is overall quite poor.    DISPO: Inpatient, expect 2 more MNs    PROBLEMS:     # Acute hypoxic respiratory failure - requiring suppl O2 on admission -- ?2/2 PNA vs volume status  # Acute pleural effusion - likely 2/2 volume status  # Hypervolemia  # ESRD + iHD (T,Th,Sa)  # AE CHF - systolic dysfunction, reduced EF 15-20%  # T2 MI - 2/2 demand/perfusion stress, as above  # Renal osteodystrophy - demonstrated on XR this visit  # Anemia of chronic renal dz  # ischemic finger - subacute/chronic, since UE fistula revision, vascular reportedly aware per admit note, nothing to acutely manage  # Atrial fibrillation  # Chronic anticoagulation - apixaban  # Hyperthyroid? - on methimazole  # CAD  # DM2  # PAD s/p R BKA, R toe amputation  # Tobacco use hx - quit 7yr ago, 55 pk-yr hx      ~ Waylon Krishna D.O.  Chief Resident  [facility].  [email or web address].

## 2021-06-20 NOTE — ASSESSMENT & PLAN NOTE
TSH 1 month ago 2.14    Plan  -Continue methimazole BID;   -Follow up with endocrinology for tapering and hoping to discontinue if hyperthyroidism resolved.   -follow up LFTs   -Do not order Iodine contrast imaging if not needed.

## 2021-06-20 NOTE — PROGRESS NOTES
Daily Progress Note:     Date of Service: 6/20/2021  Primary Team: [facility] IM Blue Team   Attending: LOVE Tucker*   Senior Resident: Dr. Mendoza  Intern: Dr. Ac  Contact:  [phone]    Chief Complaint:   GLF, shortness of breath    Subjective  States breathing nearly going back to baseline. Still has productive cough. Otherwise, denies chest pain, headache, muscle pain/weakness.    Interval history    -Had HD on 6/19 with UF of 2.1L   -gave 1x trial of furosemide 80mg this morning, no urine output.  -Per nephrology will re-assess if doing HD tomorrow.     Ins/ Out: PO 640mls/Out per HD 2.6L net of -2.1L    Consultants/Specialty:  Nephrology    Review of Systems:    Review of Systems   Constitutional: Negative for chills, fever and malaise/fatigue.   Respiratory: Positive for cough, sputum production and shortness of breath. Negative for hemoptysis and wheezing.    Gastrointestinal: Negative for nausea and vomiting.   Genitourinary: Negative for flank pain, frequency and hematuria.        Anuria   Musculoskeletal: Negative for falls and neck pain.   Neurological: Negative for dizziness and weakness.   Psychiatric/Behavioral: The patient is not nervous/anxious and does not have insomnia.        Objective Data:   Physical Exam:   Vitals:   Temp:  [36.5 °C (97.7 °F)-37.8 °C (100 °F)] 36.5 °C (97.7 °F)  Pulse:  [63-92] 75  Resp:  [16-20] 17  BP: ()/() 99/74  SpO2:  [86 %-96 %] 96 %     Physical Exam  Constitutional:       General: He is not in acute distress.     Appearance: Normal appearance. He is not ill-appearing or toxic-appearing.   HENT:      Head: Normocephalic.   Eyes:      General: No scleral icterus.     Pupils: Pupils are equal, round, and reactive to light.   Cardiovascular:      Rate and Rhythm: Normal rate and regular rhythm.      Pulses: Normal pulses.      Heart sounds: Normal heart sounds.   Pulmonary:      Breath sounds: No stridor. No wheezing.      Comments: Right lower  lung base decreased breath sounds. Left lung mild inspiratory crackles  Chest:      Chest wall: No tenderness.   Abdominal:      General: Abdomen is flat. Bowel sounds are normal. There is no distension.      Palpations: Abdomen is soft.   Musculoskeletal:      Comments: Brachialcephalic AVF of left arm fistula has good thrill, no hyperdynamic pulses Pulses decreases with letting go of pressure, no pseudoaneurysm noted, bulge of fistula/bruising noted. Fistula doesn't sustain tensity with arm raised against gravity.     4th DIP necrosis on left hand.   Skin:     Findings: Bruising present.   Neurological:      General: No focal deficit present.      Mental Status: He is alert and oriented to person, place, and time.   Psychiatric:         Mood and Affect: Mood normal.         Behavior: Behavior normal.         Thought Content: Thought content normal.         Judgment: Judgment normal.           Labs:   Pending CBC, BMP.     Imaging:   No new imaging.   Problem Representation:    Mr. Sepulveda is a 75-year-old man with past medical history remarkable for ESRD on iHD via left arm fistula TTS, HFrEF (EF 30% 5/12/2021), PAD s/p B/L BKA (left 5/28/2021), IDDM 2 (A1c 5.7 5/11/2021), CAD status post multi stent placement with 80% ostial in-stent restenosis 5/18/2021 complicated by distal RCA disease and steal syndrome with digital DIP ischemia of the fourth finger admitted for hypoxemic respiratory failure 2/2 anasarca s/p hemodialysis, still on 2L of oxygen; on trial of 1x 80mg furosemide, CXR tomorrow,     Per nephrology will assess if needing HD tomorrow.       * Acute on chronic systolic congestive heart failure (HCC)- (present on admission)  Assessment & Plan  #Acute hypoxic respiratory failure secondary to  #Acute pleural effusion secondary to  #Hypervolemia in the setting of  #Acute on chronic CHF (reduced ejection fraction 30% 5/12/2021 TTE)    *Status post ultrafiltration in ED with symptomatic improvement  *Status  post thoracentesis in ED; 1 L removed, fluid transudative.  Reports symptomatic improvement      Improving volume status, no orthopnea.     Plan  -continue carvedilol 3.125mg  -continue atorvastatin 40mg qdaily  -switching to home furosemide dose if euvolemic prior to discharge    -continue to do strict ins/out with IV furosemide injection; currently now at 80mg IV furosemide    -1500 mL fluid restriction  -Low-sodium renal diet              Head trauma  Assessment & Plan  #Ground-level fall complicated by  #Suspicion for right femoral neck fracture in the setting of  #Renal osteodystrophy- BMD     *CT was ordered, patient refused/declined to undergo test  -No further interventions at this time to address fracture  -Continue calcitriol 0.25 MCG daily        Paroxysmal A-fib (HCC)- (present on admission)  Assessment & Plan  #ESRD on HD    Stable. At sinus rhtyhm    Plan  -continue carvediolol 3.125mg  -continue apixaban 5 mg BID per pharmacy dosing.     Type 2 diabetes mellitus with kidney complication, with long-term current use of insulin ([facility])- (present on admission)  Assessment & Plan  #IDDM 2  #PAD s/p b/l BKA    Plan.  -Continue glargine 10 units nightly  -SSI  -Lispro insulin 3 units preprandial               CAD (coronary artery disease)- (present on admission)  Assessment & Plan  #CAD  #HLD  #ESRD on HD    -continue Carvedilol 3.125mg qdaily  -Atorvastatin 40mg qdaily     Pleural effusion  Assessment & Plan  S/p thoracentesis    Plan  -checking CXR prior to discharge.         Ischemia of finger  Assessment & Plan  Digital finger ischemia 2/2 AVF steal from brachiocephalic.     Plan  -no wound care needed at this point.       Hyperthyroidism- (present on admission)  Assessment & Plan  TSH 1 month ago 2.14    Plan  -Continue methimazole BID;   -Follow up with endocrinology for tapering and hoping to discontinue if hyperthyroidism resolved.   -follow up LFTs   -Do not order Iodine contrast imaging if not  needed.     T/L/D  1x PIV  Diet: diabetic and renal diet  GI prophylaxis: none  DVT prophylaxis: apixaban 5mg BID--  Code: DNAR/DNI  Dispo: oxygen evaluation for tomorrow, pending discharge tomorrow.

## 2021-06-20 NOTE — PROGRESS NOTES
Daily Progress Note:     Date of Service: 6/19/2021  Primary Team: [facility] IM Gray Team   Attending: Waylon Krishna D.O   Senior Resident: Dr. Mario Mendoza MD  Intern: Dr. NIMA Ac MD  Contact:  [phone]    Chief Complaint:   Brought by EMS status post ground-level fall    Subjective:  Mr. Sepulveda is a 75-year-old man with past medical history remarkable for ESRD on iHD via left arm fistula TTS, HFrEF (EF 30% 5/12/2021), PAD s/p B/L BKA (left 5/28/2021), IDDM 2 (A1c 5.7 5/11/2021), CAD status post multi stent placement with 80% ostial in-stent restenosis 5/18/2021 complicated by distal RCA disease and steal syndrome with ischemic wounds to the fourth finger was brought by ambulance status post ground-level fall.  Patient was recently admitted for pneumonia, treated with Levaquin.  Per wife in the last 2 weeks patient's appeared more confused and short of breath.  Of note, his hemodialysis sessions were curtailed in the last week in the setting of hypotension during dialysis.  Patient underwent right-sided thoracentesis of 1000 mL guided by ultrasound; resulted in transudate of fluid.    Consultants/Specialty:  Nephrology  Review of Systems:    Right hip pain.  Objective Data:   Physical Exam:   Vitals:   Temp:  [36.2 °C (97.1 °F)-37.8 °C (100 °F)] 37.8 °C (100 °F)  Pulse:  [] 76  Resp:  [14-20] 20  BP: ()/() 136/82  SpO2:  [90 %-98 %] 91 %    General: Elderly man laying in bed in no acute distress  HEENT: NC/AT.  PERRLA, EOMI.  External ear normal.  Nares patent, nonedematous nonerythematous.  Moist mucous membranes. Good dentition.  Trachea midline.   Neck: No JVP.  Carotid bruit could not be appreciated.  Nontender, nonnodular thyroid.  No anterior or posterior cervical, occipital, supraclavicular lymphadenopathy  Cardiovascular: PMI<2 cm at fifth costal space at midclavicular line.  No heaves appreciated.  No thrills.  RRR W/O M, R, G.  Pulmonary: Normal work of breathing.   Resonant to percussion.  Fine Rales heard in 10 lung fields  Abdomen: Flat, soft, nondistended.  Normoactive bowel sounds.  Nontender to percussion or palpation.  No hepatosplenomegaly noted.  No fluid wave  Musculoskeletal: Bilateral BKA's.  Left BKA wrapped in bandages.  Right stump healthy.  Ischemic changes to fourth finger, fistula patent  Skin: Warm and dry.  No rashes, bruises or lacerations noted  Neuro: Alert and oriented X4.  CN II-XII checked and intact.  5 out of 5 strength throughout.  DTR 2+ throughout.  FTN, HTS intact.  Sensation intact . negative Romberg.  Lymphatic: No edema or lymphadenopathy noted.  Psychiatric: Good mood congruent affect.  Thought form linear, content appropriate      Labs:   None new  Imaging:   CXR 6/18/2021: Markable for reduction pleural effusion levels  XR hands: No fractures noted  XR pelvis: Irregular subcapital right femoral neck concerning for fracture  CT head: No acute process    Assessment and plan  Mr. Sepulveda is a 75-year-old man with past medical history remarkable for ESRD on iHD via left arm fistula TTS, HFrEF (EF 30% 5/12/2021), PAD s/p B/L BKA (left 5/28/2021), IDDM 2 (A1c 5.7 5/11/2021), CAD status post multi stent placement with 80% ostial in-stent restenosis 5/18/2021 complicated by distal RCA disease and steal syndrome with ischemic wounds to the fourth finger was brought by ambulance status post ground-level fall, found to be in acute on chronic hypoxic respiratory failure secondary to HFpEF (EF 30%)    #Acute hypoxic respiratory failure secondary to  #Acute pleural effusion secondary to  #Hypervolemia in the setting of  #Acute on chronic CHF (reduced ejection fraction 30% 5/12/2021 TTE)  Etiology thought to be hypotension during dialysis leading to curtailed hemodialysis sessions, thereby resulting in fluid overload.  BNP greater than 35,000 corroborative.  *Status post ultrafiltration in ED with symptomatic improvement  *Status post thoracentesis in  ED; 1 L removed, fluid transudative.  Reports symptomatic improvement  -Continue iHD TTS per nephrology  -Nephrology consulted appreciate management of ESRD  -Furosemide 80 mg IV twice daily; patient oliguric  -1500 mL fluid restriction  -Low-sodium renal diet  #ESRD  See above    #Type II MI  In the setting of increased demand, treatment as above    #Ground-level fall complicated by  #Suspicion for right femoral neck fracture in the setting of  Renal osteodystrophy  *CT was ordered, patient refused/declined to undergo test  -No further interventions at this time to address fracture  -Continue calcitriol 0.25 MCG daily    #Atrial fibrillation  #Chronic anticoagulation  -Continue apixaban  -Continue carvedilol  -Continue Plavix    #Ischemic finger secondary to  #Steal syndrome  -Vascular surgery consulted on previous admission; recommend no interventions at this time.    #Hypothyroidism?  Continue methimazole    #CAD  #HLD  -Carvedilol as per above  -Atorvastatin as per above    #IDDM 2  -Continue glargine 10 units nightly  -SSI  -Lispro insulin 3 units preprandial    -  *

## 2021-06-20 NOTE — PROGRESS NOTES
Report received from NOC Rn. Assumed pt care. Pt is on 2 L O2. Pt A&O x 4. Fall precautions in place, call light and belongings within reach, bed in lowest position. No signs of distress.

## 2021-06-20 NOTE — PROGRESS NOTES
[facility] Nephrology Daily Progress Note     Date of Service  6/20/2021    Chief Complaint  The patient is a 75-year-old gentleman with history of end-stage renal disease on hemodialysis Tuesday, Thursday and Saturday at [facility], presented to [facility] with complaints of dyspnea, tachypnea and hypoxia requiring supplemental oxygen.  He normally resides at a skilled nursing facility.  The patient's last dialysis was on Thursday.  He had a full treatment and it was uneventful, but   prior to that the last two treatments were cut short secondary to hypotension.  Chest x-ray in the emergency room here shows a large right pleural effusion.  The patient states that he has been coughing with productive sputum over the past several days, getting progressively worse.  He has no fevers or chills.  Nephrology was asked to see him for his ESRD and dialysis management and ultrafiltration for volume overload.    Interval Problem Update  6/19 - S/p PUF yesterday with net UF of 3 L. He had thoracentesis yesterday as well with 1000 mL drained. He feels better. HD today.  6/20 - HD yest, UF 2.1L. C/O SOB, congestion. Does not want HD today. Is agreeable to trial of IV lasix and possibly dialysis tomorrow.      Review of Systems  Review of Systems   Constitutional: Negative.    HENT: Negative.    Eyes: Negative.    Respiratory: Positive for cough and shortness of breath.    Cardiovascular: Negative.    Gastrointestinal: Negative.    Genitourinary: Negative for dysuria.        Minimal UOP   Musculoskeletal: Negative.    Skin: Negative.         Ecchymosis L AVF, S/P revision  Necrotic areas on L fingers   Neurological: Negative.    Endo/Heme/Allergies: Negative.    Psychiatric/Behavioral: Negative.         Physical Exam  Temp:  [36.5 °C (97.7 °F)-37.8 °C (100 °F)] 36.5 °C (97.7 °F)  Pulse:  [74-92] 92  Resp:  [16-20] 16  BP: ()/() 127/91  SpO2:  [91 %-93 %] 92 %    Physical Exam  GENERAL:  He  is in no acute hemodynamic distress.    HEENT:  Atraumatic, normocephalic.  Pupils are equal and round, reactive to   light.  NECK:  Moderate jugular venous distention noted.  Supple.  CHEST:  some fine crackles at the base bilateral    CARDIAC:  S1, S2 heard. L AVF +thrill/+bruit  PULMONARY: No rhonchi, rales or wheezes noted. Diminished BS in RLL  ABDOMEN:  Soft, nontender, nondistended.  Bowel sounds are present.  EXTREMITIES:  Without any evidence of cyanosis, clubbing or edema.  He has a   palpable thrill and bruit in the left upper extremity.  He has bilateral BKAs.  NEUROLOGIC:  Cranial nerves are intact.  PSYCHIATRIC:  No anxiety.  Judgment is intact.    Fluids    Intake/Output Summary (Last 24 hours) at 6/20/2021 0935  Last data filed at 6/19/2021 1344  Gross per 24 hour   Intake 500 ml   Output 2600 ml   Net -2100 ml       Laboratory  Recent Labs     06/18/21  1407   WBC 9.1   RBC 2.38*   HEMOGLOBIN 8.1*   HEMATOCRIT 26.4*   .9*   MCH 34.0*   MCHC 30.7*   RDW 71.6*   PLATELETCT 271   MPV 9.3     Recent Labs     06/18/21  1407   SODIUM 139   POTASSIUM 4.7   CHLORIDE 96   CO2 32   GLUCOSE 102*   BUN 41*   CREATININE 4.78*   CALCIUM 9.2     Recent Labs     06/18/21  1407   APTT 37.3*   INR 2.21*     Recent Labs     06/18/21  1407   NTPROBNP >02327*            ASSESSMENT:  1.  End-stage renal disease, on hemodialysis Tuesday, Thursday and Saturday at   [facility].  2.  Chronic hypotension.  3.  Peripheral vascular disease.  4.  Volume overload/shortness of breath/pleural effusion on the right.  5.  Cardiomyopathy with an ejection fraction of 15-20%.  6.  History of 80% ostial LAD in-stent restenosis.  7.  History of steal syndrome, status post DRI.  8.  Atrial fibrillation.  9.  Diabetes mellitus.  10.  Hyperthyroidism.  11.  History of anemia of chronic kidney disease.     PLAN:    -Pt is not agreeable to addition HD today   -Trial of IV lasix x 1 dose  -Re-evaluate the need for addition HD  tomorrow   -Continue maintenance  HD qTTS   -Encourage coughing and deep breathing  -No dietary protein restrictions.    -Renal/low sodium/diabetic diet.  -Avoid nephrotoxins.    -Renal dose medications for ESRD    Thank you,

## 2021-06-21 NOTE — PROGRESS NOTES
Daily Progress Note:     Date of Service: 6/21/2021  Primary Team: [facility] IM Blue Team   Attending: LOVE Tucker*   Senior Resident: Dr. Mendoza  Intern: Dr. Ac  Contact:  [phone]    Chief Complaint:   GLF, shortness of breath    ID:  Mr. Sepulveda is a 75-year-old man with past medical history remarkable for ESRD on iHD via left arm fistula TTS, HFrEF (EF 30% 5/12/2021), PAD s/p B/L BKA (left 5/28/2021), IDDM 2 (A1c 5.7 5/11/2021), CAD status post multi stent placement with 80% ostial in-stent restenosis 5/18/2021 complicated by distal RCA disease and steal syndrome with ischemic wounds to the fourth finger was brought by ambulance status post ground-level fall.  Patient was recently admitted for pneumonia, treated with Levaquin.  Per wife in the last 2 weeks patient's appeared more confused and short of breath.  Of note, his hemodialysis sessions were curtailed in the last week in the setting of hypotension during dialysis.  Patient underwent right-sided thoracentesis of 1000 mL guided by ultrasound; resulted in transudative fluid    Objective  Pt reports his breathing is improving. O2 demands are 3 liter of O2.     Interval history    -Had HD on 6/19 with UF of 2.1L   -gave 1x trial of furosemide 80mg this morning, no urine output.  -Per nephrology will re-assess if doing HD tomorrow.     Ins/ Out: PO 640mls/Out per HD 2.6L net of -2.1L    Consultants/Specialty:  Nephrology    Review of Systems:    Review of Systems   Constitutional: Negative for chills, fever and malaise/fatigue.   Respiratory: Positive for cough, sputum production and shortness of breath. Negative for hemoptysis and wheezing.    Gastrointestinal: Negative for nausea and vomiting.   Genitourinary: Negative for flank pain, frequency and hematuria.        Anuria   Musculoskeletal: Negative for falls and neck pain.   Neurological: Negative for dizziness and weakness.   Psychiatric/Behavioral: The patient is not nervous/anxious and  does not have insomnia.        Objective Data:   Physical Exam:   Vitals:   Temp:  [36.1 °C (97 °F)-36.9 °C (98.4 °F)] 36.1 °C (97 °F)  Pulse:  [78-93] 78  Resp:  [16-18] 18  BP: ()/(39-55) 113/55  SpO2:  [92 %-98 %] 92 %     General: Elderly man laying in bed in no acute distress  HEENT: NC/AT.  PERRLA, EOMI.  External ear normal.  Nares patent, nonedematous nonerythematous.  Moist mucous membranes. Good dentition.  Trachea midline.   Neck: No JVP.  Carotid bruit could not be appreciated.  Nontender, nonnodular thyroid.  No anterior or posterior cervical, occipital, supraclavicular lymphadenopathy  Cardiovascular: PMI<2 cm at fifth costal space at midclavicular line.  No heaves appreciated.  No thrills.  RRR W/O M, R, G.  Pulmonary: Normal work of breathing.  Resonant to percussion.  Ronchi heard in 10 lung fields  Abdomen: Flat, soft, nondistended.  Normoactive bowel sounds.  Nontender to percussion or palpation.  No hepatosplenomegaly noted.  No fluid wave  Musculoskeletal: Bilateral BKA's.  Left BKA wrapped in bandages.  Right stump healthy.  Ischemic changes to fourth finger, fistula patent  Skin: Warm and dry.  No rashes, bruises or lacerations noted  Neuro: Alert and oriented X4.  CN II-XII checked and intact.  5 out of 5 strength throughout.  DTR 2+ throughout.  FTN, HTS intact.  Sensation intact . negative Romberg.  Lymphatic: No edema or lymphadenopathy noted.  Psychiatric: Good mood congruent affect.  Thought form linear, content appropriate    Labs:   Recent Labs     06/18/21  1845 06/21/21  1034   WBC  --  9.5   RBC  --  2.43*   HEMOGLOBIN  --  8.0*   HEMATOCRIT  --  26.7*   MCV  --  109.9*   MCH  --  32.9   RDW  --  67.9*   PLATELETCT  --  279   MPV  --  9.2   EOSINOPHILS 9  --      Recent Labs     06/20/21  0957 06/21/21  1034   SODIUM 137 136   POTASSIUM 4.5 4.5   CHLORIDE 96  --    CO2 29  --    GLUCOSE 140*  --    BUN 37* 49*          Imaging:   No new imaging.   Problem  Representation:    Mr. Sepulveda is a 75-year-old man with past medical history remarkable for ESRD on iHD via left arm fistula TTS, HFrEF (EF 30% 5/12/2021), PAD s/p B/L BKA (left 5/28/2021), IDDM 2 (A1c 5.7 5/11/2021), CAD status post multi stent placement with 80% ostial in-stent restenosis 5/18/2021 complicated by distal RCA disease and steal syndrome with digital DIP ischemia of the fourth finger admitted for hypoxemic respiratory failure 2/2 anasarca s/p hemodialysis, O2 demand worsening, pleural effusion recurred.         * Acute on chronic systolic congestive heart failure (HCC)- (present on admission)  Assessment & Plan  #Acute hypoxic respiratory failure secondary to  #Acute pleural effusion secondary to  #Hypervolemia in the setting of  #Acute on chronic CHF (reduced ejection fraction 30% 5/12/2021 TTE)    *Status post ultrafiltration in ED with symptomatic improvement  *Status post thoracentesis in ED; 1 L removed, fluid transudative.  Reports symptomatic improvement      Improving volume status, no orthopnea.     Plan  -continue carvedilol 3.125mg  -continue atorvastatin 40mg qdaily  -D/C furosemide, no yield  -Cont iHD per nephrology    -1500 mL fluid restriction  -Low-sodium renal diet              Pleural effusion  Assessment & Plan  S/p thoracentesis 6/18.  Repeat CXR 6/21 worsened appearance of pleural effusion    -Repeat thoracentesis at the bedside        Ischemia of finger  Assessment & Plan  Digital finger ischemia 2/2 AVF steal from brachiocephalic.     Plan  -no wound care needed at this point.       Head trauma  Assessment & Plan  #Ground-level fall complicated by  #Suspicion for right femoral neck fracture in the setting of  #Renal osteodystrophy- BMD     *CT was ordered, patient refused/declined to undergo test  -No further interventions at this time to address fracture  -Continue calcitriol 0.25 MCG daily        Paroxysmal A-fib (HCC)- (present on admission)  Assessment & Plan  #ESRD on  HD    Stable. At sinus rhtyhm    Plan  -continue carvediolol 3.125mg  -continue apixaban 5 mg BID per pharmacy dosing.     Type 2 diabetes mellitus with kidney complication, with long-term current use of insulin (HCC)- (present on admission)  Assessment & Plan  #IDDM 2  #PAD s/p b/l BKA    Plan.  -Continue glargine 10 units nightly  -SSI  -Lispro insulin 3 units preprandial               CAD (coronary artery disease)- (present on admission)  Assessment & Plan  #CAD  #HLD  #ESRD on HD    -continue Carvedilol 3.125mg qdaily  -Atorvastatin 40mg qdaily     Hyperthyroidism- (present on admission)  Assessment & Plan  TSH 1 month ago 2.14    Plan  -Continue methimazole BID;   -Follow up with endocrinology for tapering and hoping to discontinue if hyperthyroidism resolved.   -follow up LFTs   -Do not order Iodine contrast imaging if not needed.     T/L/D  1x PIV  Diet: diabetic and renal diet  GI prophylaxis: none  DVT prophylaxis: apixaban 5mg BID--  Code: DNAR/DNI  Dispo: oxygen evaluation for tomorrow, pending discharge tomorrow.

## 2021-06-21 NOTE — CARE PLAN
The patient is Watcher - Medium risk of patient condition declining or worsening    Shift Goals  Clinical Goals: diuresis, wean off 02   Patient Goals: rest, comfort   Family Goals: NA    Progress made toward(s) clinical / shift goals:  pt. Using incentive spirometer frequently. Pt. Had an additional session of dialysis.     Problem: Respiratory  Goal: Patient will achieve/maintain optimum respiratory ventilation and gas exchange  Outcome: Progressing     Problem: Fluid Volume  Goal: Fluid volume balance will be maintained  Outcome: Progressing

## 2021-06-21 NOTE — PROGRESS NOTES
[facility] Nephrology Daily Progress Note     Date of Service  6/21/2021    Chief Complaint  The patient is a 75-year-old gentleman with history of end-stage renal disease on hemodialysis Tuesday, Thursday and Saturday at [facility], presented to [facility] with complaints of dyspnea, tachypnea and hypoxia requiring supplemental oxygen.  He normally resides at a skilled nursing facility.  The patient's last dialysis was on Thursday.  He had a full treatment and it was uneventful, but   prior to that the last two treatments were cut short secondary to hypotension.  Chest x-ray in the emergency room here shows a large right pleural effusion.  The patient states that he has been coughing with productive sputum over the past several days, getting progressively worse.  He has no fevers or chills.  Nephrology was asked to see him for his ESRD and dialysis management and ultrafiltration for volume overload.    Interval Problem Update  6/19 - S/p PUF yesterday with net UF of 3 L. He had thoracentesis yesterday as well with 1000 mL drained. He feels better. HD today.  6/20 - HD yest, UF 2.1L. C/O SOB, congestion. Does not want HD today. Is agreeable to trial of IV lasix and possibly dialysis tomorrow.    6/21 - Sitting up at Bedside eating breakfast. Improved SOB. C/o cough with sputum production. No UOP recorded last 24 hrs.  Would like to go to home vs. Rehab.     Review of Systems  Review of Systems   Constitutional: Negative.    HENT: Negative.    Eyes: Negative.    Respiratory: Positive for cough and shortness of breath.    Cardiovascular: Negative.    Gastrointestinal: Negative.    Genitourinary: Negative for dysuria.        Minimal UOP   Musculoskeletal: Negative.    Skin: Negative.         Ecchymosis L AVF, S/P revision  Necrotic areas on L fingers   Neurological: Negative.    Endo/Heme/Allergies: Negative.    Psychiatric/Behavioral: Negative.         Physical Exam  Temp:  [36.1 °C (97  °F)-36.9 °C (98.4 °F)] 36.1 °C (97 °F)  Pulse:  [75-93] 78  Resp:  [16-18] 18  BP: ()/(39-74) 113/55  SpO2:  [92 %-98 %] 92 %    Physical Exam  GENERAL:  He is in no acute hemodynamic distress.    HEENT:  Atraumatic, normocephalic.  Pupils are equal and round, reactive to   light.  NECK:  Moderate jugular venous distention noted.  Supple.  CHEST:  some fine crackles at the base bilateral    CARDIAC:  S1, S2 heard. L AVF +thrill/+bruit  PULMONARY: No rhonchi, rales or wheezes noted. Diminished posterior BS.   ABDOMEN:  Soft, nontender, nondistended.  Bowel sounds are present.  EXTREMITIES:  Without any evidence of cyanosis, clubbing or edema.  He has a   palpable thrill and bruit in the left upper extremity.  He has bilateral BKAs, no edema.  NEUROLOGIC:  Cranial nerves are intact.  SKIN: s/p R Thoracentesis  PSYCHIATRIC:  No anxiety.  Judgment is intact.    Fluids    Intake/Output Summary (Last 24 hours) at 6/21/2021 1022  Last data filed at 6/21/2021 0900  Gross per 24 hour   Intake 480 ml   Output --   Net 480 ml       Laboratory  Recent Labs     06/18/21  1407   WBC 9.1   RBC 2.38*   HEMOGLOBIN 8.1*   HEMATOCRIT 26.4*   .9*   MCH 34.0*   MCHC 30.7*   RDW 71.6*   PLATELETCT 271   MPV 9.3     Recent Labs     06/18/21  1407 06/20/21  0957   SODIUM 139 137   POTASSIUM 4.7 4.5   CHLORIDE 96 96   CO2 32 29   GLUCOSE 102* 140*   BUN 41* 37*   CREATININE 4.78* 4.51*   CALCIUM 9.2 9.1     Recent Labs     06/18/21  1407   APTT 37.3*   INR 2.21*     Recent Labs     06/18/21  1407   NTPROBNP >39072*                IMAGING:   DX-CHEST-2 VIEWS  Order: [id number]  Status:  Final result   Visible to patient:  Yes (MyChart) Next appt:  06/25/2021 at 02:00 PM in Cardiology (Tammi Viramontes, A.P.R.N.)   0 Result Notes  Details    Reading Physician Reading Date Result Priority   Cesar Parada M.D.  [phone] 6/21/2021 Routine      Narrative & Impression     6/21/2021 10:57 AM     HISTORY/REASON FOR EXAM:  Shortness of  Breath.        TECHNIQUE/EXAM DESCRIPTION AND NUMBER OF VIEWS:  Two views of the chest.     COMPARISON:  6/18/2021     FINDINGS:     Cardiomediastinal silhouette is stable.     Very large right pleural effusion which has increased in size from prior study. There is associated compressive atelectasis of the right lung. Correlate clinically for infection. No pneumothorax.     No acute osseous abnormality.     IMPRESSION:     Large right pleural effusion which has increased in size from prior study.            ASSESSMENT:  1.  End-stage renal disease, on hemodialysis Tuesday, Thursday and Saturday at   [facility].  2.  Chronic hypotension, Bps fluctuating  -Midodrine   3.  Peripheral vascular disease  4.  Volume overload/shortness of breath/pleural effusion on the right  -s/p thoracentesis  - PCXR 6/21 Lg R Pleural Effusion increased in size  5.  Cardiomyopathy with an ejection fraction of 15-20%.  6.  History of 80% ostial LAD in-stent restenosis  7.  History of steal syndrome, status post DRI  8.  Atrial fibrillation  -Apixaban  9.  Diabetes mellitus.  10.  Hyperthyroidism.  11.  History of anemia of chronic kidney disease     PLAN:    -PUF today (MON)  -Continue maintenance  HD qTTS, tomorrow (TUES)  -Encourage coughing and deep breathing  -No dietary protein restrictions.    -Renal/low sodium/diabetic diet.  -Avoid nephrotoxins.    -Renal dose medications for ESRD    Thank you,

## 2021-06-21 NOTE — PROGRESS NOTES
Report received from NOC Rn. Assumed pt care. Pt is on 3 L 02. Pt A&O x 4. Fall precautions in place, call light and belongings within reach, bed in lowest position. No signs of distress.

## 2021-06-21 NOTE — DISCHARGE PLANNING
Outpatient Dialysis Note    Confirmed patient is active at:    [facility]   [address]  [place] [address]    Schedule: Tuesday, Thursday, Saturday   Time: 06:00am    Patient is seen by Dr. Hunter in HD clinic.    Spoke with Ging at facility who confirmed.    Forwarded records for review.    Becca Bojorquez- Dialysis Coordinator Ph# [phone]  [facility]

## 2021-06-21 NOTE — PROGRESS NOTES
Pt. Transported down to [facility] with transport staff Almaz, monitor room notified.

## 2021-06-21 NOTE — WOUND TEAM
[facility]  Inpatient Services  Initial Wound and Skin Care Evaluation    Admission Date: 2021     Last order of IP CONSULT TO WOUND CARE was found on 2021 from Hospital Encounter on 2021     HPI, PMH, SH: Reviewed    Past Surgical History:   Procedure Laterality Date   • KNEE AMPUTATION BELOW Left 2021    Procedure: AMPUTATION, BELOW KNEE.;  Surgeon: Jarett Márquez M.D.;  Location: SURGERY [facility];  Service: Orthopedics   • KNEE AMPUTATION BELOW Right 2020    Procedure: AMPUTATION, BELOW KNEE ...;  Surgeon: Leobardo Lynn M.D.;  Location: SURGERY [facility];  Service: Orthopedics   • TOE AMPUTATION Right 2020    Procedure: AMPUTATION, TOE GREAT;  Surgeon: Leobardo Lynn M.D.;  Location: SURGERY [facility];  Service: Orthopedics   • TOE AMPUTATION Left 2020    Procedure: AMPUTATION, TOE GREAT;  Surgeon: Leobardo Lynn M.D.;  Location: [facility];  Service: Orthopedics   • TENOTOMY Left 2020    Procedure: FLEXOR TENOTOMIES, TWO-FIVE TOES;  Surgeon: Leobardo Lynn M.D.;  Location: SURGERY [facility];  Service: Orthopedics   • APPENDECTOMY     • OTHER CARDIAC SURGERY      stents x1   • OTHER ORTHOPEDIC SURGERY      knee surgery     Social History     Tobacco Use   • Smoking status: Former Smoker     Packs/day: 1.00     Years: 55.00     Pack years: 55.00     Types: Cigarettes     Quit date: 2014     Years since quittin.4   • Smokeless tobacco: Never Used   Substance Use Topics   • Alcohol use: Not Currently     Chief Complaint   Patient presents with   • Fall   • Abrasion     hands and elbow     Diagnosis: Hypoxemia [R09.02]    Unit where seen by Wound Team: [id number]/     WOUND CONSULT/FOLLOW UP RELATED TO:  Left hand, R hand, sacrum     WOUND HISTORY:  Patient has arterial insufficiency of the left arm, cool purple fingers and eschar noted to fingers.     WOUND ASSESSMENT/LDA  Wound 21 Soft Tissue Necrosis  Foot;Toe, 2nd;Toe, 3rd (Active)   Number of days: 32       Wound 12/31/20 Knee Lower Right BKA (Active)   Number of days: 172       Wound 01/06/21 Buttocks;Coccyx (Active)   Number of days: 166       Wound 01/13/21 Pressure Injury Heel Posterior;Upper Left over a bump on calcaneal tendon POA (Active)   Number of days: 159       Wound 05/28/21 Incision Leg Left adaptic, 4x4s, webril, ace (Active)   Number of days: 24       Wound 06/19/21 Finger, 2nd;Finger, 3rd;Finger, 4th Left (Active)   Wound Image    06/21/21 1600   Site Assessment Red;Black;Eschar 06/21/21 1600   Periwound Assessment Cool;Purple 06/21/21 1600   Margins Attached edges;Defined edges 06/21/21 1600   Closure Open to air 06/21/21 1600   Drainage Amount None 06/21/21 1600   Drainage Description Sanguineous 06/21/21 0837   Treatments Cleansed;Site care 06/21/21 1600   Wound Cleansing Normal Saline Irrigation 06/19/21 0136   Dressing Cleansing/Solutions 3% Betadine 06/21/21 1600   Dressing Options Open to Air 06/21/21 1600   Dressing Change/Treatment Frequency Daily, and As Needed 06/21/21 1600   NEXT Dressing Change/Treatment Date 06/22/21 06/21/21 1600   NEXT Weekly Photo (Inpatient Only) 06/28/21 06/21/21 1600   Shape irregular 06/21/21 1600   Wound Odor None 06/21/21 1600   Exposed Structures EMELIA 06/21/21 1600   WOUND NURSE ONLY - Time Spent with Patient (mins) 45 06/21/21 1600   Number of days: 2        Vascular:    PATSY:   No results found.    Lab Values:    Lab Results   Component Value Date/Time    WBC 9.5 06/21/2021 10:34 AM    RBC 2.43 (L) 06/21/2021 10:34 AM    HEMOGLOBIN 8.0 (L) 06/21/2021 10:34 AM    HEMATOCRIT 26.7 (L) 06/21/2021 10:34 AM    CREACTPROT 24.11 (H) 11/13/2020 10:28 PM    SEDRATEWES >120 (H) 11/13/2020 10:28 PM    HBA1C 5.7 (H) 05/11/2021 06:30 AM        Culture Results show:  No results found for this or any previous visit (from the past 720 hour(s)).    Pain Level/Medicated:  Patient tolerated assessment       INTERVENTIONS BY  WOUND TEAM:  Chart and images reviewed. Discussed with bedside RN. All areas of concern (based on picture review, LDA review and discussion with bedside RN) have been thoroughly assessed. Documentation of areas based on significant findings. This RN in to assess patient. Performed standard wound care which includes appropriate positioning, dressing removal and non-selective debridement. Pictures and measurements obtained weekly if/when required.  Preparation for Dressing removal:   Cleansed with: NS  Sharp debridement: NA  Genesis wound:   Primary Dressing: Betadine 3%  Secondary (Outer) Dressing: ALHAJI    Interdisciplinary consultation: Patient, Bedside RN, wound care RN Ember    EVALUATION / RATIONALE FOR TREATMENT:  Most Recent Date:  6/21/21: Left hand cool and purple with areas of eschar, 3% betadine ordered to be applied by bedside nursing once a day.  Right hand skin tear to the 4th digit resolved.  Sacrum pink, intact and blanching.     Goals: Steady decrease in wound area and depth weekly.    WOUND TEAM PLAN OF CARE ([X] for frequency of wound follow up,):   Nursing to follow orders written for wound care. Contact wound team if area fails to progress, deteriorates or with any questions/concerns  Dressing changes by wound team:                   Follow up 3 times weekly:                NPWT change 3 times weekly:     Follow up 1-2 times weekly:      Follow up Bi-Monthly:                   Follow up as needed:   X  Other (explain):     NURSING PLAN OF CARE ORDERS (X):  Dressing changes: See Dressing Care orders: X  Skin care: See Skin Care orders:   RN Prevention Protocol:   Rectal tube care: See Rectal Tube Care orders:   Other orders:    RSKIN:   CURRENTLY IN PLACE (X), APPLIED THIS VISIT (A), ORDERED (O):   Q shift Kwame:  X  Q shift pressure point assessments:  X    Surface/Positioning   Pressure redistribution mattress            Low Airloss          Bariatric foam      Bariatric GELA     Waffle cushion         Waffle Overlay          Reposition q 2 hours      TAPs Turning system     Z Shad Pillow     Offloading/Redistribution  Sacral Mepilex (Silicone dressing)     Heel Mepilex (Silicone dressing)         Heel float boots (Prevalon boot)             Float Heels off Bed with Pillows           Respiratory NA  Silicone O2 tubing         Gray Foam Ear protectors     Cannula fixation Device (Tender )          High flow offloading Clip    Elastic head band offloading device      Anchorfast                                                         Trach with Optifoam split foam             Containment/Moisture Prevention NA  Rectal tube or BMS    Purwick/Condom Cath        Tomas Catheter    Barrier wipes           Barrier paste       Antifungal tx      Interdry        Mobilization NA    Up to chair        Ambulate      PT/OT      Nutrition NA     Dietician        Diabetes Education      PO     TF     TPN     NPO   # days     Other        Anticipated discharge plans: anticipate possible follow up with PCP regarding L hand  LTACH:        SNF/Rehab:                  Home Health Care:           Outpatient Wound Center:            Self/Family Care:        Other:

## 2021-06-21 NOTE — PROGRESS NOTES
I, Waylon Krishna D.O., have personally seen and examined the patient and discussed the management with the resident.   I performed a substantial portion of the EM visit face-to-face on the same date of service as the resident note.  I reviewed the resident's note and agree with the documented findings and plan of care, amended as necessary.      Date of Service 06/21/21:  ---  75M p/w GLF from wheelchair at SNF (resident).     Has hx of ESRD + iHD (T,Th,Sa) and recent sessions have been limited by hypotension.  ?volume overload on ED Admission     Increased freq of HD on admission, now back to regular schedule.  Cont to monitor volume status, apprec nephro.  Chronically ill, low baseline function over past several months, slowly seems to be deteriorating, hopefully can change trajectory, though prognosis is overall quite poor.     6/20- better today, states he feels ill compared to baseline, but definitely improved since admission.  Trial IV furosemide, though pt states negligible urine output since AM IV dose. Will DC if no apparent benefit in attempt to preserve his remaining residual renal function (if any at this point).  Historically, he states he used to put out up to ~1/2L urine, even while on iHD schedule.    6/21- Still no UOP, R pleural effusion recurrence, worse than prior. Expect this is likely underlying cause for O2 suppl demands above baseline (no home O2)  Planning therapeutic thora today, hopefully with ongoing UF his volume overload can be managed.     DISPO: Inpatient, expect 1-2 more MNs     PROBLEMS:      # Acute hypoxic respiratory failure - requiring suppl O2 on admission -- likely 2/2 volume overload, pleural effusions  # Recurrent 6/21, Acute pleural effusion - likely 2/2 volume status  # Hypervolemia  # ESRD + iHD (T,Th,Sa)  # AE CHF - systolic dysfunction, reduced EF 15-20%  # T2 MI - 2/2 demand/perfusion stress, as above  # Renal osteodystrophy - demonstrated on XR this visit  #  Anemia of chronic renal dz  # ischemic finger - subacute/chronic, since UE fistula revision, vascular reportedly aware per admit note, nothing to acutely manage  # Atrial fibrillation  # Chronic anticoagulation - apixaban  # Hyperthyroid? - on methimazole  # CAD  # DM2  # PAD s/p R BKA, R toe amputation  # Tobacco use hx - quit 7yr ago, 55 pk-yr hx        ~ Waylon Krishna D.O.  Chief Resident  [facility] Internal Medicine.  [email or web address].

## 2021-06-21 NOTE — PROGRESS NOTES
HD/PUF treatment ordered by DR Agee for 2 hrs.  Treatment started at 1223 and ended at 1425.    Net UF 2500 ml    Pt tolerated treatment well. SBP dropped to 90's, improved with lower UFR. HD needle removed. Dy gauze applied and changed. No bleeding issue. + B/T  See paper flowsheets for details.    Report given to [place] RN

## 2021-06-21 NOTE — CARE PLAN
The patient is Stable - Low risk of patient condition declining or worsening    Shift Goals  Clinical Goals: diuresis, wean off 02   Patient Goals: rest, comfort   Family Goals: NA    Progress made toward(s) clinical / shift goals:  IV lasix as ordered. Will wean O2 as tolerated.      Problem: Skin Integrity  Goal: Skin integrity is maintained or improved  Outcome: Progressing     Problem: Fall Risk  Goal: Patient will remain free from falls  Outcome: Progressing

## 2021-06-22 NOTE — CARE PLAN
The patient is Stable - Low risk of patient condition declining or worsening    Shift Goals  Clinical Goals: diuresis, wean off 02   Patient Goals: rest, comfort   Family Goals: NA    Progress made toward(s) clinical / shift goals:  Fall precautions in place. Pt turns self in bed. Tolerating CPAP fairly.       Problem: Skin Integrity  Goal: Skin integrity is maintained or improved  Outcome: Progressing     Problem: Fall Risk  Goal: Patient will remain free from falls  Outcome: Progressing

## 2021-06-22 NOTE — PROGRESS NOTES
Daily Progress Note:     Date of Service: 6/22/2021  Primary Team: [facility] IM Blue Team   Attending: LOVE Tucker*   Senior Resident: Dr. Mendoza  Intern: Dr. Ac  Contact:  [phone]    Chief Complaint:   GLF, shortness of breath    ID:  Mr. Sepulveda is a 75-year-old man with past medical history remarkable for ESRD on iHD via left arm fistula TTS, HFrEF (EF 30% 5/12/2021), PAD s/p B/L BKA (left 5/28/2021), IDDM 2 (A1c 5.7 5/11/2021), CAD status post multi stent placement with 80% ostial in-stent restenosis 5/18/2021 complicated by distal RCA disease and steal syndrome with ischemic wounds to the fourth finger was brought by ambulance status post ground-level fall.  Patient was recently admitted for pneumonia, treated with Levaquin.  Per wife in the last 2 weeks patient's appeared more confused and short of breath.  Of note, his hemodialysis sessions were curtailed in the last week in the setting of hypotension during dialysis.  Patient underwent right-sided thoracentesis of 1000 mL guided by ultrasound; resulted in transudative fluid    Objective  Pt reports his breathing is improving. O2 demands are 3 liter of O2. PT was evaluated for candidacy for thoracentesis 6/22; no appropriate window was identified.    Interval history    -Had HD on 6/19 with UF of 2.1L   -gave 1x trial of furosemide 80mg this morning, no urine output.  -Per nephrology will re-assess if doing HD tomorrow.     Ins/ Out: PO 640mls/Out per HD 2.6L net of -2.1L    Consultants/Specialty:  Nephrology    Review of Systems:    Review of Systems   Constitutional: Negative for chills, fever and malaise/fatigue.   Respiratory: Positive for cough, sputum production and shortness of breath. Negative for hemoptysis and wheezing.    Gastrointestinal: Negative for nausea and vomiting.   Genitourinary: Negative for flank pain, frequency and hematuria.        Anuria   Musculoskeletal: Negative for falls and neck pain.   Neurological: Negative  for dizziness and weakness.   Psychiatric/Behavioral: The patient is not nervous/anxious and does not have insomnia.        Objective Data:   Physical Exam:   Vitals:   Temp:  [35.9 °C (96.7 °F)-36.7 °C (98 °F)] 35.9 °C (96.7 °F)  Pulse:  [78-92] 83  Resp:  [16-20] 20  BP: ()/(32-59) 113/34  SpO2:  [92 %-99 %] 99 %     General: Elderly man laying in bed in no acute distress  HEENT: NC/AT.  PERRLA, EOMI.  External ear normal.  Nares patent, nonedematous nonerythematous.  Moist mucous membranes. Good dentition.  Trachea midline.   Neck: No JVP.  Carotid bruit could not be appreciated.  Nontender, nonnodular thyroid.  No anterior or posterior cervical, occipital, supraclavicular lymphadenopathy  Cardiovascular: PMI<2 cm at fifth costal space at midclavicular line.  No heaves appreciated.  No thrills.  RRR W/O M, R, G.  Pulmonary: Normal work of breathing.  Resonant to percussion.  Ronchi heard in 10 lung fields  Abdomen: Flat, soft, nondistended.  Normoactive bowel sounds.  Nontender to percussion or palpation.  No hepatosplenomegaly noted.  No fluid wave  Musculoskeletal: Bilateral BKA's.  Left BKA wrapped in bandages.  Right stump healthy.  Ischemic changes to fourth finger, fistula patent  Skin: Warm and dry.  No rashes, bruises or lacerations noted  Neuro: Alert and oriented X4.  CN II-XII checked and intact.  5 out of 5 strength throughout.  DTR 2+ throughout.  FTN, HTS intact.  Sensation intact . negative Romberg.  Lymphatic: No edema or lymphadenopathy noted.  Psychiatric: Good mood congruent affect.  Thought form linear, content appropriate    Labs:   Recent Labs     06/21/21  1034 06/22/21  0304   WBC 9.5 8.6   RBC 2.43* 2.58*   HEMOGLOBIN 8.0* 8.4*   HEMATOCRIT 26.7* 28.2*   .9* 109.3*   MCH 32.9 32.6   RDW 67.9* 67.0*   PLATELETCT 279 276   MPV 9.2 9.0   NEUTSPOLYS  --  74.60*   LYMPHOCYTES  --  12.90*   MONOCYTES  --  11.10   EOSINOPHILS  --  0.20   BASOPHILS  --  0.20     Recent Labs      06/20/21  0957 06/21/21  1034 06/22/21  0304   SODIUM 137 136 138   POTASSIUM 4.5 4.5 5.0   CHLORIDE 96  --  96   CO2 29  --  30   GLUCOSE 140*  --  167*   BUN 37* 49* 53*          Imaging:   No new imaging.   Problem Representation:    Mr. Sepulveda is a 75-year-old man with past medical history remarkable for ESRD on iHD via left arm fistula TTS, HFrEF (EF 30% 5/12/2021), PAD s/p B/L BKA (left 5/28/2021), IDDM 2 (A1c 5.7 5/11/2021), CAD status post multi stent placement with 80% ostial in-stent restenosis 5/18/2021 complicated by distal RCA disease and steal syndrome with digital DIP ischemia of the fourth finger admitted for hypoxemic respiratory failure 2/2 anasarca s/p hemodialysis, O2 demand worsening, pleural effusion recurred.         * Acute on chronic systolic congestive heart failure (HCC)- (present on admission)  Assessment & Plan  #Acute hypoxic respiratory failure secondary to  #Acute pleural effusion secondary to  #Hypervolemia in the setting of  #Acute on chronic CHF (reduced ejection fraction 30% 5/12/2021 TTE)    *Status post ultrafiltration in ED with symptomatic improvement  *Status post thoracentesis in ED; 1 L removed, fluid transudative.  Reports symptomatic improvement      Improving volume status, no orthopnea.     Plan  -continue carvedilol 3.125mg  -continue atorvastatin 40mg qdaily  -D/C furosemide, no yield  -Cont iHD per nephrology    -1500 mL fluid restriction  -Low-sodium renal diet              Pleural effusion  Assessment & Plan  S/p thoracentesis 6/18.  Repeat CXR 6/21 worsened appearance of pleural effusion    -Repeat thoracentesis at the bedside        Ischemia of finger  Assessment & Plan  Digital finger ischemia 2/2 AVF steal from brachiocephalic.     Plan  -no wound care needed at this point.       Head trauma  Assessment & Plan  #Ground-level fall complicated by  #Suspicion for right femoral neck fracture in the setting of  #Renal osteodystrophy- BMD     *CT was ordered,  patient refused/declined to undergo test  -No further interventions at this time to address fracture  -Continue calcitriol 0.25 MCG daily        Paroxysmal A-fib (HCC)- (present on admission)  Assessment & Plan  #ESRD on HD    Stable. At sinus rhtyhm    Plan  -continue carvediolol 3.125mg  -continue apixaban 5 mg BID per pharmacy dosing.     Type 2 diabetes mellitus with kidney complication, with long-term current use of insulin (HCC)- (present on admission)  Assessment & Plan  #IDDM 2  #PAD s/p b/l BKA    Plan.  -Continue glargine 10 units nightly  -SSI  -Lispro insulin 3 units preprandial               CAD (coronary artery disease)- (present on admission)  Assessment & Plan  #CAD  #HLD  #ESRD on HD    -continue Carvedilol 3.125mg qdaily  -Atorvastatin 40mg qdaily     Hyperthyroidism- (present on admission)  Assessment & Plan  TSH 1 month ago 2.14    Plan  -Continue methimazole BID;   -Follow up with endocrinology for tapering and hoping to discontinue if hyperthyroidism resolved.   -follow up LFTs   -Do not order Iodine contrast imaging if not needed.     T/L/D  1x PIV  Diet: diabetic and renal diet  GI prophylaxis: none  DVT prophylaxis: apixaban 5mg BID--  Code: DNAR/DNI  Dispo: oxygen evaluation for tomorrow, pending discharge tomorrow.

## 2021-06-22 NOTE — PROGRESS NOTES
[facility] Progress Note        HD today x 3 hours per  1349. Initiated at  and ended at 1650.   Patient assessed prior tx. Patient alert and oriented x4. Resting in bed.          UF Net: 1430 mL  Unable to meet UF goal due to hypotension periodically.       Please see paper flowsheet for details.        Blood returned. Applied gauze and held LUE AVF site for 10 minutes. Verified no bleeding post treatment. Bruit and thrill present post dialysis.   Instructions given to Primary RN that if bleeding occurs on the LUE AVF site, change dressing and held the site with pressure.         Report given to Primary nurse Belia Perry RN.

## 2021-06-22 NOTE — HEART FAILURE PROGRAM
Per Dr. Landavrede's note today, patient is still requiring elevated O2 and as such remains inpatient.     I've asked the hospital schedulers therefore to push out the patient's appointment with the HF clinic on 6/25/21.    Thank you, Madison, Cardio RN Navigator [phone]

## 2021-06-22 NOTE — PROGRESS NOTES
I, Waylon Krishna D.O., have personally seen and examined the patient and discussed the management with the resident.   I performed a substantial portion of the EM visit face-to-face on the same date of service as the resident note.  I reviewed the resident's note and agree with the documented findings and plan of care, amended as necessary.      Date of Service 06/22/21:  ---  75M p/w GLF from wheelchair at SNF (resident).     Has hx of ESRD + iHD (T,Th,Sa) and recent sessions have been limited by hypotension.  ?volume overload on ED Admission     Increased freq of HD on admission, now back to regular schedule.  Cont to monitor volume status, apprec nephro.  Chronically ill, low baseline function over past several months, slowly seems to be deteriorating, hopefully can change trajectory, though prognosis is overall quite poor.     6/20- better today, states he feels ill compared to baseline, but definitely improved since admission.  Trial IV furosemide, though pt states negligible urine output since AM IV dose. Will DC if no apparent benefit in attempt to preserve his remaining residual renal function (if any at this point).  Historically, he states he used to put out up to ~1/2L urine, even while on iHD schedule.  6/21- Still no UOP, R pleural effusion recurrence, worse than prior. Expect this is likely underlying cause for O2 suppl demands above baseline (no home O2)    6/22- Initially Planning therapeutic thora today, but unable to visualize adequate fluid reservoir, procedure therefore aborted.   Hopefully ongoing UF has addressed volume overload.  Apprec nephro for iHD.  Cont wean O2.     DISPO: Inpatient, Still requiring elevated suppl O2, DC planning back to SNF, pending O2 improvement     PROBLEMS:      # Acute hypoxic respiratory failure - requiring suppl O2 on admission -- likely 2/2 volume overload, pleural effusions  # Recurrent 6/21, Acute pleural effusion - likely 2/2 volume status  #  Hypervolemia  # ESRD + iHD (T,Th,Sa)  # AE CHF - systolic dysfunction, reduced EF 15-20%  # T2 MI - 2/2 demand/perfusion stress, as above  # Renal osteodystrophy - demonstrated on XR this visit  # Anemia of chronic renal dz  # ischemic finger - subacute/chronic, since UE fistula revision, vascular reportedly aware per admit note, nothing to acutely manage  # Atrial fibrillation  # Chronic anticoagulation - apixaban  # Hyperthyroid? - on methimazole  # CAD  # DM2  # PAD s/p R BKA, R toe amputation  # Tobacco use hx - quit 7yr ago, 55 pk-yr hx        ~ Waylon Krishna D.O.  Chief Resident  [facility].  [email or web address].

## 2021-06-22 NOTE — CARE PLAN
Problem: Skin Integrity  Goal: Skin integrity is maintained or improved  Outcome: Not Progressing  Note: MD made aware of Pt L fingers. Cyanotic/Eschar and cold. MD stated she would contact limb preservation team.     Problem: Knowledge Deficit - Standard  Goal: Patient and family/care givers will demonstrate understanding of plan of care, disease process/condition, diagnostic tests and medications  Outcome: Progressing     Problem: Fall Risk  Goal: Patient will remain free from falls  Outcome: Progressing     Problem: Pain - Standard  Goal: Alleviation of pain or a reduction in pain to the patient’s comfort goal  Outcome: Progressing     Problem: Respiratory  Goal: Patient will achieve/maintain optimum respiratory ventilation and gas exchange  Outcome: Progressing     Problem: Fluid Volume  Goal: Fluid volume balance will be maintained  Outcome: Progressing     The patient is Stable - Low risk of patient condition declining or worsening    Shift Goals  Clinical Goals: diuresis, wean off 02   Patient Goals: rest, comfort   Family Goals: NA      Patient is not progressing towards the following goals:      Problem: Skin Integrity  Goal: Skin integrity is maintained or improved  Outcome: Not Progressing  Note: MD made aware of Pt L fingers. Cyanotic/Eschar and cold. MD stated she would contact limb preservation team.

## 2021-06-22 NOTE — PROGRESS NOTES
[facility] Nephrology Daily Progress Note     Date of Service  6/22/2021    Chief Complaint  The patient is a 75-year-old gentleman with history of end-stage renal disease on hemodialysis Tuesday, Thursday and Saturday at [facility], presented to [facility] with complaints of dyspnea, tachypnea and hypoxia requiring supplemental oxygen.  He normally resides at a skilled nursing facility.  The patient's last dialysis was on Thursday.  He had a full treatment and it was uneventful, but   prior to that the last two treatments were cut short secondary to hypotension.  Chest x-ray in the emergency room here shows a large right pleural effusion.  The patient states that he has been coughing with productive sputum over the past several days, getting progressively worse.  He has no fevers or chills.  Nephrology was asked to see him for his ESRD and dialysis management and ultrafiltration for volume overload.    Interval Problem Update  6/19 - S/p PUF yesterday with net UF of 3 L. He had thoracentesis yesterday as well with 1000 mL drained. He feels better. HD today.  6/20 - HD yest, UF 2.1L. C/O SOB, congestion. Does not want HD today. Is agreeable to trial of IV lasix and possibly dialysis tomorrow.    6/21 - Sitting up at Bedside eating breakfast. Improved SOB. C/o cough with sputum production. No UOP recorded last 24 hrs.  Would like to go to home vs. Rehab.   6/22: 2.5 L Net UF. 3L NC. C/o R upper CP on inspiration. Thoracentesis today at BS. Primary RN @ BS. C/o cont. Numbness/tingling in L Hand.     Review of Systems  Review of Systems   Constitutional: Negative.    HENT: Negative.    Eyes: Negative.    Respiratory: Positive for cough and shortness of breath.    Cardiovascular: Negative.    Gastrointestinal: Negative.    Genitourinary: Negative for dysuria.        Minimal UOP   Musculoskeletal: Negative.    Skin: Negative.         Ecchymosis L AVF, S/P revision  Necrotic areas on L fingers    Neurological: Negative.    Endo/Heme/Allergies: Negative.    Psychiatric/Behavioral: Negative.         Physical Exam  Temp:  [35.9 °C (96.7 °F)-36.8 °C (98.3 °F)] 36.6 °C (97.9 °F)  Pulse:  [76-92] 92  Resp:  [16-18] 18  BP: ()/(32-59) 99/33  SpO2:  [92 %-99 %] 92 %    Physical Exam  GENERAL:  He is in no acute hemodynamic distress.    HEENT:  Atraumatic, normocephalic.  Pupils are equal and round, reactive to   light.  NECK:  Moderate jugular venous distention noted.  Supple.  CHEST: Diminished t/o.  CARDIAC:  S1, S2 heard. L AVF +thrill/+bruit  PULMONARY: No rhonchi, rales or wheezes noted. Diminished posterior BS.   ABDOMEN:  Soft, nontender, nondistended.  Bowel sounds are present.  EXTREMITIES:  L hand with necrosis of 2nd,3rd and 4th fingers with ulcerations.  Palpable thrill and bruit in the left upper extremity.  He has bilateral BKAs, no edema.  NEUROLOGIC:  Cranial nerves are intact.  SKIN: s/p R Thoracentesis  PSYCHIATRIC:  No anxiety.  Judgment is intact.    Fluids    Intake/Output Summary (Last 24 hours) at 6/22/2021 1032  Last data filed at 6/21/2021 1425  Gross per 24 hour   Intake 620 ml   Output 3000 ml   Net -2380 ml       Laboratory  Recent Labs     06/21/21  1034 06/22/21  0304   WBC 9.5 8.6   RBC 2.43* 2.58*   HEMOGLOBIN 8.0* 8.4*   HEMATOCRIT 26.7* 28.2*   .9* 109.3*   MCH 32.9 32.6   MCHC 30.0* 29.8*   RDW 67.9* 67.0*   PLATELETCT 279 276   MPV 9.2 9.0     Recent Labs     06/20/21  0957 06/21/21  1034 06/22/21  0304   SODIUM 137 136 138   POTASSIUM 4.5 4.5 5.0   CHLORIDE 96  --  96   CO2 29  --  30   GLUCOSE 140*  --  167*   BUN 37* 49* 53*   CREATININE 4.51*  --  6.63*   CALCIUM 9.1  --  9.0         No results for input(s): NTPROBNP in the last 72 hours.             IMAGING:   DX-CHEST-2 VIEWS  Order: [id number]  Status:  Final result   Visible to patient:  Yes (MyChart) Next appt:  06/25/2021 at 02:00 PM in Cardiology ([email or web address] [email or web address])   0 Result  Notes  Details    Reading Physician Reading Date Result Priority   Cesar Parada M.D.  [phone] 6/21/2021 Routine      Narrative & Impression     6/21/2021 10:57 AM     HISTORY/REASON FOR EXAM:  Shortness of Breath.        TECHNIQUE/EXAM DESCRIPTION AND NUMBER OF VIEWS:  Two views of the chest.     COMPARISON:  6/18/2021     FINDINGS:     Cardiomediastinal silhouette is stable.     Very large right pleural effusion which has increased in size from prior study. There is associated compressive atelectasis of the right lung. Correlate clinically for infection. No pneumothorax.     No acute osseous abnormality.     IMPRESSION:     Large right pleural effusion which has increased in size from prior study.            ASSESSMENT:  1.  End-stage renal disease, on hemodialysis Tuesday, Thursday and Saturday at   [facility].  2.  Chronic hypotension, Bps fluctuating  -Midodrine   3.  Peripheral vascular disease  4.  Volume overload/shortness of breath/pleural effusion on the right  -s/p thoracentesis  - PCXR 6/21 Lg R Pleural Effusion increased in size  5.  Cardiomyopathy with an ejection fraction of 15-20%  6.  History of 80% ostial LAD in-stent restenosis  7.  History of steal syndrome, status post DRI  8.  Atrial fibrillation  -Apixaban  9.  Diabetes mellitus  10.  Hyperthyroidism  11.  History of anemia of chronic kidney disease     PLAN:    -Continue maintenance HD qTTS, today (TUES)  -Encourage coughing and deep breathing  -No dietary protein restrictions  -Renal/low sodium/diabetic diet  -Avoid nephrotoxins  -Renal dose medications for ESRD   -Consult Vascular  -DNAR/DNI   -Ongoing GOC discussions   Thank you,

## 2021-06-22 NOTE — HEART FAILURE PROGRAM
Although fall at SNF where he resides is the primary reason for admission, heart failure with reduced ejection fraction (HFrEF) is also noted to be acutely decompensated.    It is noted that patient is declining he is currently a DNAR/DNI does have documents on file. With that said, it might be pertinent to solicit the input of palliative to reassess current goals of care particularly if patient is declining.    Nursing: please complete the HF Discharge Checklist (pink sheet in hard chart) and have it cosigned by your charge RN before patient leaves the hospital.    Providers: below are all Guideline Directed Medical Therapy (GDMT) indicated for HFrEF. If any can not be prescribed by discharge, please note the clinical reason for each in your discharge summary.    QUICK SUMMARY OF HFrEF MEASURES    -- Evidence Based Beta Blocker (bisoprolol, carvedilol, or toprol xl), for EF of 40% or less  -- YOMI - I, for EF of 40% or less  -- Aldosterone antagonist, for EF of 35% or less  -- Anticoagulation for atrial arrhythmia, if applicable  -- Glycemic control for DM + HF, if diabetic  -- Lipid lowering medication for DM + HF, if diabetic  -- Hydralazine dinitrate, if pt self identifies as   -- Pneumococcal vaccine if not previously received  -- Influenza vaccine if not previously received for the current flu season  -- Smoking cessation counseling documented if applicable  -- Device screening if applicable    Daily Nurse: please begin to fill out the HF checklist (pink sheet in hard chart) and use it to guide your daily care.    Discharge Nurse: please ensure completeness of the HF checklist (pink sheet in hard chart) and have it co-signed by the charge RN before the patient leaves the hospital.    Thank you, Madison, Cardio RN Navigator [phone]      DETAILED EXPLANATION OF HF MEASURES:  1. Documentation of LV systolic function (echo or cath) PTA, during this hospitalization, or plan to assess post discharge or  reason for not assessing documented  2. Documentation of fluid intake and urine output every nursing shift  3. 2 hour post diuretic assessment documented 2 hours after diuretic given  4. HF Patient Education using the Living Well With Heart Failure Booklet and Symptom Tracker documented every nursing shift  5. Nutrition consult for diet education  6. Daily weights (one weight documented every 24 hours) on a standing scale unless standing is contraindicated in which case bed scale can be used - have patient write weight on symptom tracker  7. For LVEF less than or equal to 40%, ACE-I, ARNI or ARB prescribed at discharge   8. For LVEF less than or equal to 40%, an Evidence Based Beta Blocker (bisoprolol, carvedilol, toprol xl) must be prescribed at discharge  9. For LVEF less than or equal to 35% aldosterone blockade prescribed at discharge  10. The combination of hydralazine and isosorbide dinitrate is recommended to reduce morbidity and mortality for patients self-described  Americans with NYHA class III-IV HFrEF (EF 40% or less), receiving optimal therapy with ACE inhibitors and beta blockers, unless contraindicated (Class I, RADHA: A).  11. If a HF patient is diabetic or is newly diagnosed with DM: prescribed diabetes treatment at discharge in the form of glycemic control (diet or anti-hyperglycemic medication) or f/u appointment for diabetes management scheduled at discharge.  12. If a HF patient has diabetes: prescribed lipid lowering medication at discharge  13. Documented smoking cessation advice or counseling  14. If a HF patient has a-fib: anticoagulation is prescribed upon discharge or contraindication is documented  15. Screening for and administering immunizations as long as no contraindications: Pneumonia (regardless of age) and Influenza  16. Written discharge instructions include:  ? Daily weights  ? Record weight on tracker  ? Bring tracker to appointments  ? Call MD for weight gain of 3lb /day  or 5lb/week  ? HF medication teaching  ? Low sodium diet  ? Follow up appointment within seven calendar days of d/c must include: date, time and location  ? Activity  ? Worsening symptoms    What if any of the above HF measures are contraindicated?  ? Request that the discharging provider document the medication/intervention and the contraindication specifically in a progress note  ? For example: “no CHF meds due to hypotension” is not enough. It needs to say: “No ACE-I, ARNI, ARB due to hypotension”; “No Beta Blockade due to bradycardia”…

## 2021-06-23 NOTE — CARE PLAN
The patient is Watcher - Medium risk of patient condition declining or worsening    Shift Goals  Clinical Goals: pain control and monitor blood sugar   Patient Goals: rest, pain control, and food   Family Goals: NA    Progress made toward(s) clinical / shift goals:  prn medication on MAR for pain RN offered to patient along with non pharmacological pain relief. Morning   Problem: Knowledge Deficit - Standard  Goal: Patient and family/care givers will demonstrate understanding of plan of care, disease process/condition, diagnostic tests and medications  Outcome: Progressing     Problem: Skin Integrity  Goal: Skin integrity is maintained or improved  Outcome: Progressing     Problem: Fall Risk  Goal: Patient will remain free from falls  Outcome: Progressing     Problem: Pain - Standard  Goal: Alleviation of pain or a reduction in pain to the patient’s comfort goal  Outcome: Progressing       Patient is not progressing towards the following goals:

## 2021-06-23 NOTE — PROGRESS NOTES
[facility] Nephrology Daily Progress Note     Date of Service  6/23/2021    Chief Complaint  The patient is a 75-year-old gentleman with history of end-stage renal disease on hemodialysis Tuesday, Thursday and Saturday at [facility], presented to [facility] with complaints of dyspnea, tachypnea and hypoxia requiring supplemental oxygen.  He normally resides at a skilled nursing facility.  The patient's last dialysis was on Thursday.  He had a full treatment and it was uneventful, but   prior to that the last two treatments were cut short secondary to hypotension.  Chest x-ray in the emergency room here shows a large right pleural effusion.  The patient states that he has been coughing with productive sputum over the past several days, getting progressively worse.  He has no fevers or chills.  Nephrology was asked to see him for his ESRD and dialysis management and ultrafiltration for volume overload.    Interval Problem Update  6/19 - S/p PUF yesterday with net UF of 3 L. He had thoracentesis yesterday as well with 1000 mL drained. He feels better. HD today.  6/20 - HD yest, UF 2.1L. C/O SOB, congestion. Does not want HD today. Is agreeable to trial of IV lasix and possibly dialysis tomorrow.    6/21 - Sitting up at Bedside eating breakfast. Improved SOB. C/o cough with sputum production. No UOP recorded last 24 hrs.  Would like to go to home vs. Rehab.   6/22: 2.5 L Net UF. 3L NC. C/o R upper CP on inspiration. Thoracentesis today at BS. Primary RN @ BS. C/o cont. Numbness/tingling in L Hand.   6/23: 1430 mL Net UF. Sitting up at side of bed on phone call with brother.  Decreased SOB. C/o congestion. 3.5 L NC.     Review of Systems  Review of Systems   Constitutional: Negative.    HENT: Negative.    Eyes: Negative.    Respiratory: Positive for cough and shortness of breath.    Cardiovascular: Negative.    Gastrointestinal: Negative.    Genitourinary: Negative for dysuria.        Minimal  UOP   Musculoskeletal: Negative.    Skin: Negative.         Ecchymosis L AVF, S/P revision  Necrotic areas on L fingers   Neurological: Negative.    Endo/Heme/Allergies: Negative.    Psychiatric/Behavioral: Negative.         Physical Exam  Temp:  [35.6 °C (96.1 °F)-37.4 °C (99.3 °F)] 36.7 °C (98 °F)  Pulse:  [] 82  Resp:  [18-21] 18  BP: ()/(24-98) 107/66  SpO2:  [92 %-99 %] 95 %    Physical Exam  GENERAL:  He is in no acute hemodynamic distress.    HEENT:  Atraumatic, normocephalic.  Pupils are equal and round, reactive to   light.  NECK:  Moderate jugular venous distention noted.  Supple.  CHEST: Diminished t/o.  CARDIAC:  S1, S2 heard. L AVF +thrill/+bruit  PULMONARY: No rhonchi, rales or wheezes noted. Diminished posterior BS.   ABDOMEN:  Soft, nontender, nondistended.  Bowel sounds are present.  EXTREMITIES:  L hand with necrosis of 2nd,3rd and 4th fingers with ulcerations.  Palpable thrill and bruit in the left upper extremity.  He has bilateral BKAs, no edema.  NEUROLOGIC:  Cranial nerves are intact.  SKIN: s/p R Thoracentesis  PSYCHIATRIC:  No anxiety.  Judgment is intact.    Fluids    Intake/Output Summary (Last 24 hours) at 6/23/2021 1007  Last data filed at 6/22/2021 1930  Gross per 24 hour   Intake 620 ml   Output 1932 ml   Net -1312 ml       Laboratory  Recent Labs     06/21/21  1034 06/22/21  0304 06/23/21  0322   WBC 9.5 8.6 7.5   RBC 2.43* 2.58* 2.38*   HEMOGLOBIN 8.0* 8.4* 7.9*   HEMATOCRIT 26.7* 28.2* 25.6*   .9* 109.3* 107.6*   MCH 32.9 32.6 33.2*   MCHC 30.0* 29.8* 30.9*   RDW 67.9* 67.0* 65.2*   PLATELETCT 279 276 261   MPV 9.2 9.0 8.9*     Recent Labs     06/21/21  1034 06/22/21  0304 06/23/21  0322   SODIUM 136 138 136   POTASSIUM 4.5 5.0 4.5   CHLORIDE  --  96 99   CO2  --  30 28   GLUCOSE  --  167* 149*   BUN 49* 53* 32*   CREATININE  --  6.63* 4.61*   CALCIUM  --  9.0 8.7         No results for input(s): NTPROBNP in the last 72 hours.             IMAGING:   DX-CHEST-2  VIEWS  Order: [id number]  Status:  Final result   Visible to patient:  Yes (MyChart) Next appt:  06/25/2021 at 02:00 PM in Cardiology (TABATHA Laws)   0 Result Notes  Details    Reading Physician Reading Date Result Priority   Cesar Parada M.D.  [phone] 6/21/2021 Routine      Narrative & Impression     6/21/2021 10:57 AM     HISTORY/REASON FOR EXAM:  Shortness of Breath.        TECHNIQUE/EXAM DESCRIPTION AND NUMBER OF VIEWS:  Two views of the chest.     COMPARISON:  6/18/2021     FINDINGS:     Cardiomediastinal silhouette is stable.     Very large right pleural effusion which has increased in size from prior study. There is associated compressive atelectasis of the right lung. Correlate clinically for infection. No pneumothorax.     No acute osseous abnormality.     IMPRESSION:     Large right pleural effusion which has increased in size from prior study.            ASSESSMENT:  1.  End-stage renal disease, on hemodialysis Tuesday, Thursday and Saturday at   [facility] via L AVF  2.  Chronic hypotension, Bps fluctuating  -Midodrine   3.  Peripheral vascular disease  4.  Volume overload/shortness of breath/pleural effusion on the right  -s/p thoracentesis  - PCXR 6/21 Lg R Pleural Effusion increased in size, therapeutic thora unable to visualize fluid. No thora performed 6/22  5.  Cardiomyopathy with an ejection fraction of 15-20%  6.  History of 80% ostial LAD in-stent restenosis  7.  History of steal syndrome, status post DRIL procedure  8.  Atrial fibrillation  -Apixaban  9.  Diabetes mellitus  10.  Hyperthyroidism  11.  History of anemia of chronic kidney disease  - Goal Hmg 10-11, below goal      PLAN:    -Continue maintenance HD qTTS, No HD today (THURS)  -Encourage coughing and deep breathing  -No dietary protein restrictions  -Renal/low sodium/diabetic diet  -ARMANDO with HD  -Avoid nephrotoxins  -Renal dose medications for ESRD   - NPO for OR   -To OR today for LUE fistula banding vs.  Ligation and TDC placement.   -DNAR/DNI   -Ongoing GOC discussions   Thank you,

## 2021-06-23 NOTE — OR SURGEON
Immediate Post OP Note    PROCEDURE:     Informed consent was obtained. A timeout was taken. A right pleural effusion was localized with real-time ultrasound guidance. The right posterior chest wall was prepped and draped in a sterile manner. Following local anesthesia with 1% lidocaine, a 5 Kisw[place]ili Yueh pigtail catheter was advanced into the pleural space with trocar technique and 2400 cc of pleural fluid was drained. A 50 cc sample was saved for laboratory analysis. The patient tolerated the procedure well without evidence of complication. A post thoracentesis chest radiograph is forthcoming.        6/23/2021 1:13 PM Drew Sims M.D.

## 2021-06-23 NOTE — PROGRESS NOTES
Report received from JAN Perry at 1900. Assumed care, assessment complete. Pt is A & O x 4.  Pt denies having any pain at this time. Fall precautions and appropriate signs in place. Pt oriented to unit routine, call light/phone system and CNA and RN extension number provided. Pt educated regarding fall precautions. Bed alarm in use and locked in lowest position. Pt denies any additional needs at this time. Call light within reach.

## 2021-06-23 NOTE — PROGRESS NOTES
Assumed care of pt this am. Pt is A&O x4. Reports pain 6/10 to L fingers (pointer, middle, index). Pt sitting up at edge of bed this am. Suction set up for patient, wet cough noted. Pt is NPO for procedure this afternoon pt is aware. Blood sugar checks in place. Pt updated on plan of care. Hourly rounding in place.

## 2021-06-23 NOTE — CONSULTS
ACUTE CARE VASCULAR SERVICE  CONSULT NOTE      Date: 6/22/2021    Referring Provider: Waylon Krishna DO    Consulting Physician: John Nguyen M.D. [facility]    -------------------------------------------------------------------------------------------------    Reason for consultation:  LUE ischemic steal syndrome    HPI:  This is a 75 y.o. male who has left hand pain and gangrene of the tips of the left 2-3-4th digits.  He has a LUE upper arm fistula causing steal, and a DRIL procedure was done recently and ischemia didn't improve.  AVF still working for dialysis currently.    Past Medical History:   Diagnosis Date   • Arrhythmia     hx a fib   • Arthritis 12/29/2020    knees, ankles, back   • CAD (coronary artery disease)     stents   • Chronic anticoagulation 3/26/2020   • Chronic kidney disease (CKD), stage V (HCC)    • Congestive heart failure (HCC)     hx of   • Dental disorder 12/29/2020    partial upper   • Diabetes    • Hemodialysis patient (HCC)     Tuesday, Thursday, Saturday   • Hypertension    • Kidney failure    • Myocardial infarct (HCC)     ? 2019    • Osteoarthritis    • Peripheral neuropathy    • Pneumonia     per hx   • Sleep apnea     does not use cpap anymore       Past Surgical History:   Procedure Laterality Date   • KNEE AMPUTATION BELOW Left 5/28/2021    Procedure: AMPUTATION, BELOW KNEE.;  Surgeon: Jarett Márquez M.D.;  Location: [facility];  Service: Orthopedics   • KNEE AMPUTATION BELOW Right 12/31/2020    Procedure: AMPUTATION, BELOW KNEE ...;  Surgeon: Leobardo Lynn M.D.;  Location: [facility];  Service: Orthopedics   • TOE AMPUTATION Right 11/16/2020    Procedure: AMPUTATION, TOE GREAT;  Surgeon: Leobardo Lynn M.D.;  Location: [facility];  Service: Orthopedics   • TOE AMPUTATION Left 5/17/2020    Procedure: AMPUTATION, TOE GREAT;  Surgeon: Leobardo Lynn M.D.;  Location: [facility] ORS;  Service: Orthopedics    • TENOTOMY Left 5/17/2020    Procedure: FLEXOR TENOTOMIES, TWO-FIVE TOES;  Surgeon: Leobardo Lynn M.D.;  Location: SURGERY [facility];  Service: Orthopedics   • APPENDECTOMY     • OTHER CARDIAC SURGERY      stents x1   • OTHER ORTHOPEDIC SURGERY      knee surgery       Current Facility-Administered Medications   Medication Dose Route Frequency Provider Last Rate Last Admin   • lisinopril (PRINIVIL) tablet 2.5 mg  2.5 mg Oral Q DAY Mario Mendoza M.D.       • lidocaine (XYLOCAINE) 2 % injection 20 mL  20 mL Other Once So Ac M.D.       • HEPARIN SODIUM (PORCINE) 1000 UNIT/ML INJ SOLN            • Pharmacy Consult Request   Other PHARMACY TO DOSE Waylon Krishna D.O.       • heparin injection 1,500 Units  1,500 Units Intravenous DIALYSIS PRN Rico Curtis M.D.   1,500 Units at 06/22/21 1518   • gabapentin (NEURONTIN) capsule 300 mg  300 mg Oral QDAY Mario Mendoza M.D.   300 mg at 06/21/21 1710   • apixaban (ELIQUIS) tablet 5 mg  5 mg Oral BID Kulwant Pressley M.D.   5 mg at 06/22/21 0520   • atorvastatin (LIPITOR) tablet 40 mg  40 mg Oral QHS Kulwant Pressley M.D.   40 mg at 06/21/21 2037   • budesonide (PULMICORT) neb susp 0.5 mg  0.5 mg Nebulization BID (RT) Kulwant Pressley M.D.   0.5 mg at 06/22/21 0756   • calcitRIOL (ROCALTROL) capsule 0.25 mcg  0.25 mcg Oral DAILY Kulwant Pressley M.D.   0.25 mcg at 06/22/21 0520   • carvedilol (COREG) tablet 3.125 mg  3.125 mg Oral BID Kulwant Pressley M.D.   3.125 mg at 06/21/21 0510   • clopidogrel (PLAVIX) tablet 75 mg  75 mg Oral DAILY Kulwant Pressley M.D.   75 mg at 06/22/21 0520   • methimazole (TAPAZOLE) tablet 5 mg  5 mg Oral BID Kulwant Pressley M.D.   5 mg at 06/22/21 0520   • midodrine (PROAMATINE) tablet 10 mg  10 mg Oral TID Kulwant Pressley M.D.   10 mg at 06/22/21 1602   • omeprazole (PRILOSEC) capsule 20 mg  20 mg Oral DAILY Kulwant Pressley M.D.   20 mg at 06/22/21 0520   • oxyCODONE-acetaminophen (PERCOCET) 5-325 MG per  tablet 1 tablet  1 tablet Oral Q4HRS PRN Kulwant Pressley M.D.   1 tablet at 21 1527   • sevelamer carbonate (RENVELA) tablet 1,600 mg  1,600 mg Oral TID WITH MEALS Kulwant Pressley M.D.   1,600 mg at 21 0811   • tamsulosin (FLOMAX) capsule 0.8 mg  0.8 mg Oral QHS Kulwant Pressley M.D.   0.8 mg at 21 2037   • traMADol (ULTRAM) 50 MG tablet 25 mg  25 mg Oral Q6HRS PRN Kulwant Pressley M.D.   25 mg at 21 1602   • insulin glargine (Semglee) injection  10 Units Subcutaneous Q EVENING Kulwant Pressley M.D.   10 Units at 21 1703    And   • insulin lispro (AdmeLOG) injection  0.12 Units/kg/day Subcutaneous TID AC Kulwant Pressley M.D.   3 Units at 21 1704    And   • insulin lispro (AdmeLOG) injection  1-6 Units Subcutaneous 4X/DAY ACHS Kulwant Pressley M.D.   1 Units at 21 1705    And   • glucose 4 g chewable tablet 16 g  16 g Oral Q15 MIN PRN Kulwant Pressley M.D.        And   • dextrose 50% (D50W) injection 50 mL  50 mL Intravenous Q15 MIN PRN Kulwant Pressley M.D.           Social History     Socioeconomic History   • Marital status:      Spouse name: Not on file   • Number of children: Not on file   • Years of education: Not on file   • Highest education level: Some college, no degree   Occupational History   • Not on file   Tobacco Use   • Smoking status: Former Smoker     Packs/day: 1.00     Years: 55.00     Pack years: 55.00     Types: Cigarettes     Quit date: 2014     Years since quittin.4   • Smokeless tobacco: Never Used   Vaping Use   • Vaping Use: Never used   Substance and Sexual Activity   • Alcohol use: Not Currently   • Drug use: No   • Sexual activity: Not on file   Other Topics Concern   • Not on file   Social History Narrative   • Not on file     Social Determinants of Health     Financial Resource Strain:    • Difficulty of Paying Living Expenses:    Food Insecurity:    • Worried About Running Out of Food in the Last Year:    • Ran Out of Food in the  Last Year:    Transportation Needs: Unmet Transportation Needs   • Lack of Transportation (Medical): Yes   • Lack of Transportation (Non-Medical): Yes   Physical Activity: Inactive   • Days of Exercise per Week: 0 days   • Minutes of Exercise per Session: 0 min   Stress: Stress Concern Present   • Feeling of Stress : To some extent   Social Connections: Socially Isolated   • Frequency of Communication with Friends and Family: More than three times a week   • Frequency of Social Gatherings with Friends and Family: More than three times a week   • Attends Buddhism Services: Never   • Active Member of Clubs or Organizations: No   • Attends Club or Organization Meetings: Never   • Marital Status:    Intimate Partner Violence:    • Fear of Current or Ex-Partner:    • Emotionally Abused:    • Physically Abused:    • Sexually Abused:        Family History   Problem Relation Age of Onset   • Heart Disease Mother    • Alcohol/Drug Father    • Thyroid Son    • Diabetes Brother    • Hypertension Neg Hx    • Hyperlipidemia Neg Hx        Allergies:  Mircera [methoxy polyethylene glycol-epoetin beta] and Other drug    Review of Systems:  Constitutional: Negative for fever, chills, weight loss,   HENT:   Negative for hearing loss or tinnitus    Eyes:    Negative for blurred vision, double vision, or loss of vision  Respiratory:  Negative for cough, hemoptysis, or wheezing    Cardiac:  Negative for chest pain or palpitations or orthopnea  Vascular:  Negative for claudication or rest pain   Gastrointestinal: Negative for nausea, vomiting, or abdominal pain     Negative for hematochezia or melena   Genitourinary: Negative for dysuria, frequency, or hematuria   Musculoskeletal: Negative for myalgias, back pain, or joint pain  Skin:   Negative for itching or rash  Neurological:  Negative for dizziness, headaches, or tremors     Negative for speech disturbance     Negative for extremity weakness or  "paresthesias  Endo/Heme:  Negative for easy bruising or bleeding  Psychiatric:  Negative for depression, suicidal ideas, or hallucinations    Physical Exam:  BP (!) 106/38   Pulse 83   Temp (!) 35.6 °C (96.1 °F) (Temporal)   Resp 20   Ht 1.676 m (5' 6\")   Wt 74.7 kg (164 lb 10.9 oz)   SpO2 92%     Constitutional: Alert, oriented, no acute distress  HEENT:  Normocephalic and atraumatic, EOMI  Neck:   Supple, no JVD, no bruits  Cardiovascular: Regular rate and rhythm, no murmurs  Pulmonary:  Good air entry bilaterally, no wheezing   Abdominal:  Soft, non-tender, non-distended     Aortic impulse not widened  Musculoskeletal: No edema, no tenderness  Neurological:  CN II-XII grossly intact, no focal deficits  Skin:   Skin is warm and dry. No rash noted.  Psychiatric:  Normal mood and affect.  Vascular:  Extremities warm and well perfused  RUE: WWP     LUE: gangrene of the tips of the left 2-3-4th digits, AVF patent, hand with faint cap refill          Labs:  Recent Labs     06/21/21  1034 06/22/21  0304   WBC 9.5 8.6   RBC 2.43* 2.58*   HEMOGLOBIN 8.0* 8.4*   HEMATOCRIT 26.7* 28.2*   .9* 109.3*   MCH 32.9 32.6   MCHC 30.0* 29.8*   RDW 67.9* 67.0*   PLATELETCT 279 276   MPV 9.2 9.0     Recent Labs     06/20/21  0957 06/21/21  1034 06/22/21  0304   SODIUM 137 136 138   POTASSIUM 4.5 4.5 5.0   CHLORIDE 96  --  96   CO2 29  --  30   GLUCOSE 140*  --  167*   BUN 37* 49* 53*   CREATININE 4.51*  --  6.63*   CALCIUM 9.1  --  9.0         Recent Labs     06/20/21  0957 06/22/21  0304   ASTSGOT 9* 11*   ALTSGPT 7 6   TBILIRUBIN 0.3 0.2   ALKPHOSPHAT 123* 119*   GLOBULIN 2.8 2.8         Assessment/Plan:  -  ESRD  -  LUE ischemic steal syndrome  -  HTN  -  CAD with h/o MI  -  CHF  -  Diabetes    Recommend LUE fistula aggressive banding versus ligation and also placement of a tunneled dialysis catheter. Will also perform a RUE venogram to see if there are any options for RUE AV graft.  Tentatively 1430 on Wednesday " afternoon. NPO except meds after midnight    John Nguyen MD  [facility] (General and Vascular Surgery)  Cell: [phone] (text or call is fine, if you don't reach me please try my office)  Office: [phone]    6/22/2021    5:40 PM  ___________________________________________________________________  Patient:Luis Sepulveda   MRN:[id number]   CSN:[id number]

## 2021-06-23 NOTE — PROGRESS NOTES
0603: This RN checked this patients BP before administration of BP medications. BP was 110/33, patient is asymptomatic. BP medications held. Will recheck BP at 0630    0641: This RN rechecked this patients BP. BP is now 118/44, remains asymptomatic. Will pass on information to day shift RN.

## 2021-06-23 NOTE — ANESTHESIA PREPROCEDURE EVALUATION
76 yo M w/ hx CAD s/p multiple stents, HFrEF (EF 30% on 5/12/21), AFib, GERD, ESRD on HD TTS (has L UE AVF), hypterthyroidism, DM2, PAD s/p b/l BKA. Admitted on 6/19/21 w/ dyspnea and hypoxia needing supplemental O2; CXR showed large R pleural effusion. Last dialyzed yesteray; K 4.5 today. Pt had thoracentesis today and reports his breathing has improved. Pt was noted to hypotensive in preop initially, then after adjusting the cuff, BP was 117/49. Given pt's multiple comorbidities, planned to do this case under local and minimal sedation.    Relevant Problems   PULMONARY   (positive) Pneumonia due to infectious organism   (positive) Shortness of breath      CARDIAC   (positive) Acute on chronic systolic congestive heart failure (HCC)   (positive) Atrial fibrillation with RVR ([facility])   (positive) CAD (coronary artery disease)   (positive) CHF (congestive heart failure) ([facility])   (positive) Ischemia of finger   (positive) Left anterior fascicular block   (positive) Paroxysmal A-fib ([facility])   (positive) Right bundle branch block      GI   (positive) Gastroesophageal reflux disease         (positive) End stage renal disease on dialysis (HCC)   (positive) Hepatic cyst   (positive) Renal cyst      ENDO   (positive) Hyperthyroidism   (positive) Type 2 diabetes mellitus with kidney complication, with long-term current use of insulin (HCC)       Physical Exam    Airway   Mallampati: III  TM distance: >3 FB  Neck ROM: full       Cardiovascular - normal exam  Rhythm: regular  Rate: normal  (-) murmur     Dental       Very poor dentition   Pulmonary - normal exam  Breath sounds clear to auscultation     Abdominal    Neurological - normal exam                 Anesthesia Plan    ASA 4   ASA physical status 4 criteria: CAD/stents - recent (< 3 months) and respiratory failure    Plan - MAC               Induction: intravenous    Postoperative Plan: Postoperative administration of opioids is intended.    Pertinent diagnostic labs and  testing reviewed    Informed Consent:    Anesthetic plan and risks discussed with patient.    Use of blood products discussed with: patient whom consented to blood products.

## 2021-06-23 NOTE — PROGRESS NOTES
Dr. Sims consented patient and performed  Ultrasound Guided Right Thoracentesis in U/S room 2  .  Vitals monitored during procedure and documented in EPIC.   2400 ml of fluid removed,   1200  ml of fluid sent to lab .  Vitals monitored during procedure and documented in EPIC.  Patient tolerated procedure well.  Post xray ordered.  Report given to JAN Wolff. Transport returned pt to her room

## 2021-06-23 NOTE — CARE PLAN
The patient is Watcher - Medium risk of patient condition declining or worsening    Shift Goals  Clinical Goals: decrease work of breathing  Patient Goals: rest, comfort   Family Goals: NA    Progress made toward(s) clinical / shift goals:  Patient sitting edge of bed, mentions lungs feel more clear. Lungs are more clear on auscultation.      Problem: Knowledge Deficit - Standard  Goal: Patient and family/care givers will demonstrate understanding of plan of care, disease process/condition, diagnostic tests and medications  Outcome: Progressing     Problem: Skin Integrity  Goal: Skin integrity is maintained or improved  Outcome: Progressing     Problem: Fall Risk  Goal: Patient will remain free from falls  6/23/2021 0146 by Saundra Zambrano, R.N.  Note: Patients bed in lowest and locked position, call light and side table within reach. Non-skid socks on and educated to use call light when he would like to get up. Verbalized understanding.    6/23/2021 0145 by Saundra Zambrano, R.N.  Outcome: Progressing

## 2021-06-23 NOTE — PROGRESS NOTES
ACUTE CARE VASCULAR SERVICE  PROGRESS NOTE      OR today for LUE fistula aggressive banding versus ligation and also placement of a tunneled dialysis catheter. Will also perform a RUE venogram to see if there are any options for RUE AV graft.  Tentatively 0389 today    John Nguyen MD  [facility] (General and Vascular Surgery)  Cell: [phone] (text/call)  Office: [phone]  __________________________________________________________________  Patient:Luis Sepulveda   MRN:[id number]   [facility]:[id number]    6/23/2021    3:02 PM

## 2021-06-24 NOTE — PROGRESS NOTES
[facility] Progress Note    Hemodialysis treatment ordered today per Dr. Agee x 3 hours. Treatment initiated at 1224 and ended at 1524.    Pt hypotensive during treatment, asymptomatic. Pt stable throughout HD.; see paper flow sheets for details.    Net UF 0 mL    Post tx, CVC flushed with saline then locked with heparin 1000 units/mL per designated amount in each wing then clamped and capped. Aspirate heparin prior to next CVC use.    Report given to Primary RN.

## 2021-06-24 NOTE — OR NURSING
Arrived to PACU in stable condition. Only had iv sedation per anesthesia. He is doing well and resting. B/P has been low as reported by his anaesthesiologist. He is stable and ready to go back to his room. Denies pain or nausea

## 2021-06-24 NOTE — PROGRESS NOTES
"[facility] Nephrology Daily Progress Note     Date of Service  6/24/2021    Chief Complaint  The patient is a 75-year-old gentleman with history of end-stage renal disease on hemodialysis Tuesday, Thursday and Saturday at [facility], presented to [facility] with complaints of dyspnea, tachypnea and hypoxia requiring supplemental oxygen.  He normally resides at a skilled nursing facility.  The patient's last dialysis was on Thursday.  He had a full treatment and it was uneventful, but   prior to that the last two treatments were cut short secondary to hypotension.  Chest x-ray in the emergency room here shows a large right pleural effusion.  The patient states that he has been coughing with productive sputum over the past several days, getting progressively worse.  He has no fevers or chills.  Nephrology was asked to see him for his ESRD and dialysis management and ultrafiltration for volume overload.    Interval Problem Update  6/19 - S/p PUF yesterday with net UF of 3 L. He had thoracentesis yesterday as well with 1000 mL drained. He feels better. HD today.  6/20 - HD yest, UF 2.1L. C/O SOB, congestion. Does not want HD today. Is agreeable to trial of IV lasix and possibly dialysis tomorrow.    6/21 - Sitting up at Bedside eating breakfast. Improved SOB. C/o cough with sputum production. No UOP recorded last 24 hrs.  Would like to go to home vs. Rehab.   6/22: 2.5 L Net UF. 3L NC. C/o R upper CP on inspiration. Thoracentesis today at BS. Primary RN @ BS. C/o cont. Numbness/tingling in L Hand.   6/23: 1430 mL Net UF. Sitting up at side of bed on phone call with brother.  Decreased SOB. C/o congestion. 3.5 L NC.   6/24: Pt in bed, says he is \"sore all over\", yesterday he underwent LUE AVF ligation, RUE venogram and L IJ TDC placement with Dr. Nguyen, also had R sided thoracentesis yesterday with 2.4L removed, labs reviewed, VSS on 3L NC    Review of Systems  ROS   10-point ROS performed and " is as HPI/daily summary.    Physical Exam  Temp:  [36.1 °C (97 °F)-37.6 °C (99.7 °F)] 36.1 °C (97 °F)  Pulse:  [] 96  Resp:  [14-22] 16  BP: ()/(23-76) 94/62  SpO2:  [90 %-99 %] 93 %    Physical Exam  GENERAL:  He is in no acute hemodynamic distress.    HEENT:  Atraumatic, normocephalic.  Pupils are equal and round, reactive to   light.  NECK:  Moderate jugular venous distention noted.  Supple.  CHEST: Diminished t/o.  CARDIAC:  S1, S2 heard. L AVF s/p ligation, L IJ TDC CDI  PULMONARY: No rhonchi, rales or wheezes noted. Diminished t/o   ABDOMEN:  Soft, nontender, nondistended.  Bowel sounds are present.  EXTREMITIES:  L hand with necrosis of 2nd,3rd and 4th fingers with ulcerations. He has bilateral BKAs, no edema.  NEUROLOGIC:  Cranial nerves are intact.  SKIN: Generalized ecchymosis, wounds to L fingers as described above  PSYCHIATRIC:  No anxiety. Judgment is intact.    Fluids    Intake/Output Summary (Last 24 hours) at 6/24/2021 1117  Last data filed at 6/24/2021 0948  Gross per 24 hour   Intake 640 ml   Output 3 ml   Net 637 ml       Laboratory  Recent Labs     06/22/21 0304 06/23/21 0322 06/24/21 0223   WBC 8.6 7.5 10.3   RBC 2.58* 2.38* 2.60*   HEMOGLOBIN 8.4* 7.9* 8.7*   HEMATOCRIT 28.2* 25.6* 26.6*   .3* 107.6* 102.3*   MCH 32.6 33.2* 33.5*   MCHC 29.8* 30.9* 32.7*   RDW 67.0* 65.2* 59.1*   PLATELETCT 276 261 339   MPV 9.0 8.9* 8.7*     Recent Labs     06/22/21 0304 06/23/21 0322 06/24/21 0223   SODIUM 138 136 132*   POTASSIUM 5.0 4.5 4.8   CHLORIDE 96 99 96   CO2 30 28 25   GLUCOSE 167* 149* 135*   BUN 53* 32* 51*   CREATININE 6.63* 4.61* 6.03*   CALCIUM 9.0 8.7 9.0         No results for input(s): NTPROBNP in the last 72 hours.             IMAGING:   DX-CHEST-2 VIEWS  Order: [id number]  Status:  Final result   Visible to patient:  Yes (MyChart) Next appt:  06/25/2021 at 02:00 PM in Cardiology ([facility]   0 Result Notes  Details    Reading Physician Reading Date  Result Priority   Cesar Parada M.D.  [phone] 6/21/2021 Routine      Narrative & Impression     6/21/2021 10:57 AM     HISTORY/REASON FOR EXAM:  Shortness of Breath.        TECHNIQUE/EXAM DESCRIPTION AND NUMBER OF VIEWS:  Two views of the chest.     COMPARISON:  6/18/2021     FINDINGS:     Cardiomediastinal silhouette is stable.     Very large right pleural effusion which has increased in size from prior study. There is associated compressive atelectasis of the right lung. Correlate clinically for infection. No pneumothorax.     No acute osseous abnormality.     IMPRESSION:     Large right pleural effusion which has increased in size from prior study.        Available labs, imaging and clinical documentation reviewed.     ASSESSMENT:  # End-stage renal disease, on hemodialysis Tuesday, Thursday and Saturday at   [facility]   - L AVF, s/p ligation 6/23 with Dr. Nguyen   - Now had L IJ TDC placed 6/23  # Chronic hypotension   - On midodrine 10 mg TID   # Peripheral vascular disease, severe  # Volume overload/shortness of breath/pleural effusion on the right   -s/p R thoracentesis with 2.4L removed 6/23   # Cardiomyopathy with an ejection fraction of 15-20%  # History of 80% ostial LAD in-stent restenosis  # History of steal syndrome, status post DRIL procedure   - s/p ligation LUE AVF 6/23   # Atrial fibrillation, rate controlled    - On Eliquis and carvedilol   # Diabetes mellitus  # Hyperthyroidism, on Tapazole   # Anemia in CKD, below target    - Goal Hgb 10-11   - ARMANDO with HD  # CAD   - On statin/plavix   # MBD-CKD   - Managed at OP unit   - On calcitriol and sevelamer     SUGGESTIONS:   -Continue maintenance HD qTTS/PRN, treatment due today ([facility]URS)  -UF as able  -Stop ACEi  -Continue midodrine 10 mg TID   -No dietary protein restrictions  -Renal/low sodium/diabetic diet  -ARMANDO with HD, goal Hgb 10-11   -Transfuse PRN Hgb <7   -Avoid nephrotoxins  -Renal dose medications for ESRD   -Max dose gabapentin in  ESRD is 300 mg/day  -Continue calcitriol and sevelamer with meals   -Ongoing GOC discussions; pt is at high risk for further morbidity/mortality  -Follow labs    Thank you,

## 2021-06-24 NOTE — CARE PLAN
Problem: Knowledge Deficit - Standard  Goal: Patient and family/care givers will demonstrate understanding of plan of care, disease process/condition, diagnostic tests and medications  Outcome: Progressing     Problem: Skin Integrity  Goal: Skin integrity is maintained or improved  Outcome: Progressing   The patient is Watcher - Medium risk of patient condition declining or worsening    Shift Goals  Clinical Goals: blood sugar  Patient Goals: rest, pain control  Family Goals: NA    Progress made toward(s) clinical / shift goals:  free of falls    Patient is not progressing towards the following goals:

## 2021-06-24 NOTE — PROGRESS NOTES
Handoff report received. Assumed patient care. Patient sitting at edge of bed, eating dinner. Wife present at bedside. Patient not in distress, AAOX 4. Safety precautions in place. Call light and personal belongings within reach. Bed is locked and in lowest position. Educated to call for assistance if needed.

## 2021-06-24 NOTE — PROGRESS NOTES
Patient arrived to unit at this time. Pt connected to tele monitor. Pt is A&O x4. Currently on 3L via oxymask. Pt reports tolerable pain to L hand. Post op vitals in place per protocol.

## 2021-06-24 NOTE — ANESTHESIA POSTPROCEDURE EVALUATION
Patient: Luis Sepulveda    Procedure Summary     Date: 06/23/21 Room / Location: [facility] OR 09 / SURGERY [facility]    Anesthesia Start: 1606 Anesthesia Stop: 1730    Procedures:       UPPER EXTREMITY, VENOGRAM (Left Arm Upper)      LIGATION, VEIN (Left Arm Upper)      INSERTION, CATHETER -  PERMA CATH (Left Chest) Diagnosis: (LUE ischemic steal syndrome, ESRD)    Surgeons: John Nguyen M.D. Responsible Provider: Yonas Gutierrez M.D.    Anesthesia Type: MAC ASA Status: 4          Final Anesthesia Type: MAC  Last vitals  BP   Blood Pressure : 115/53    Temp   36.8 °C (98.2 °F)    Pulse   96   Resp   14    SpO2   97 %      Anesthesia Post Evaluation    Patient location during evaluation: PACU  Patient participation: complete - patient participated  Level of consciousness: awake and alert  Pain score: 3    Airway patency: patent  Anesthetic complications: no  Cardiovascular status: hemodynamically stable  Respiratory status: acceptable  Hydration status: euvolemic    PONV: none          No complications documented.     Nurse Pain Score: 3 (NPRS)

## 2021-06-24 NOTE — OR SURGEON
Immediate Post OP Note    PreOp Diagnosis: LUE ischemic steal syndrome      PostOp Diagnosis: same      Procedure(s):  UPPER EXTREMITY, VENOGRAM - Wound Class: Clean  LIGATION, VEIN - Wound Class: Clean  INSERTION, CATHETER -  PERMA CATH - Wound Class: Clean    Surgeon(s):  John Nguyen M.D.    Anesthesiologist/Type of Anesthesia:  Anesthesiologist: Yonas Gutierrez M.D./ALESHA    Surgical Staff:  Circulator: Nadine Mcdonald R.N.; Romy Romero R.N.  Scrub Person: Kayley Bar  Radiology Technologist: Indiana Leavitt    Specimens removed if any:  * No specimens in log *    Estimated Blood Loss: minimal    Findings: occluded right axially and subclavian veins    Complications: none        6/23/2021 8:37 PM John Nguyen M.D.

## 2021-06-24 NOTE — CARE PLAN
The patient is Stable - Low risk of patient condition declining or worsening    Shift Goals  Clinical Goals: pain control and monitor blood sugar   Patient Goals: rest, pain control, and food   Family Goals: NA      Problem: Knowledge Deficit - Standard  Goal: Patient and family/care givers will demonstrate understanding of plan of care, disease process/condition, diagnostic tests and medications  Outcome: Progressing   Plan of care discussed w/ pt. Encourage pt to voice feelings/concerns regarding treatment plan, diagnostic tests, procedures/results and medications. Answer accordingly.        Problem: Skin Integrity  Goal: Skin integrity is maintained or improved  Outcome: Progressing   Patient skin assessed. Risk factors that lead to skin breakdown/impairment identified. Interventions to prevent skin breakdown are in place.      Problem: Pain - Standard  Goal: Alleviation of pain or a reduction in pain to the patient’s comfort goal  Outcome: Progressing   Patient educated to pain scale system. Patient encouraged to verbalize discomfort. Patient taught about non-pharmacological pain management. Patient is comfortable at this time without statements of discomfort or pain.

## 2021-06-24 NOTE — PROGRESS NOTES
Daily Progress Note:     Date of Service: 6/24/2021  Primary Team: [facility] Team   Attending: Radames Sommers M.D.   Senior Resident: Dr. Mendoza  Intern: Dr. Ac  Contact:  [phone]     Chief Complaint:   GLF, shortness of breath    Subjective  Mr. Sepulveda is a 75-year-old man with past medical history remarkable for ESRD on iHD via left arm fistula TTS, HFrEF (EF 30% 5/12/2021), PAD s/p B/L BKA (left 5/28/2021), IDDM 2 (A1c 5.7 5/11/2021), CAD status post multi stent placement with 80% ostial in-stent restenosis 5/18/2021 complicated by distal RCA disease and steal syndrome with ischemic wounds to the fourth finger was brought by ambulance status post ground-level fall.  Patient was recently admitted for pneumonia, treated with Levaquin.  Per wife in the last 2 weeks patient's appeared more confused and short of breath.  Of note, his hemodialysis sessions were curtailed in the last week in the setting of hypotension during dialysis.  Patient underwent right-sided thoracentesis of 1000 mL guided by ultrasound; resulted in transudative fluid     Objective  Pt reports his breathing is improving. O2 demands are 3 liter of O2. PT was evaluated for candidacy for thoracentesis 6/22; no appropriate window was identified.     Interval history     -Had HD on 6/19 with UF of 2.1L   -Doing another HD today    S/p therapeutic thoracentesis 2.4L   Consulted palliative care    Consultants/Specialty:  Nephrology  Review of Systems:      ROS  Review of Systems   Constitutional: Negative for chills, fever and malaise/fatigue.   Respiratory: Positive for cough, sputum production and improved shortness of breath. Negative for hemoptysis and wheezing.    Gastrointestinal: Negative for nausea and vomiting.   Genitourinary: Negative for flank pain, frequency and hematuria.        Anuria   Musculoskeletal: Negative for falls and neck pain.   Neurological: Negative for dizziness and weakness.   Psychiatric/Behavioral: The  patient is not nervous/anxious and does not have insomnia.       Objective Data:   Physical Exam:   Vitals:   Temp:  [36.1 °C (97 °F)-37.6 °C (99.7 °F)] 36.1 °C (97 °F)  Pulse:  [] 96  Resp:  [14-22] 16  BP: ()/(23-76) 94/62  SpO2:  [90 %-99 %] 93 %   Physical Exam  General: Elderly man laying in bed in no acute distress  HEENT: NC/AT.  PERRLA, EOMI.  External ear normal.  Nares patent, nonedematous nonerythematous.  Moist mucous membranes. Good dentition.  Trachea midline.   Neck: No JVP.  Carotid bruit could not be appreciated.  Nontender, nonnodular thyroid.  No anterior or posterior cervical, occipital, supraclavicular lymphadenopathy  Cardiovascular: PMI<2 cm at fifth costal space at midclavicular line.  No heaves appreciated.  No thrills.  RRR W/O M, R, G.  Pulmonary: Normal work of breathing.  Resonant to percussion.  Ronchi heard in 10 lung fields  Abdomen: Flat, soft, nondistended.  Normoactive bowel sounds.  Nontender to percussion or palpation.  No hepatosplenomegaly noted.  No fluid wave  Musculoskeletal: Bilateral BKA's.  Left BKA wrapped in bandages.  Right stump healthy.  Ischemic changes to fourth finger, fistula patent  Skin: Warm and dry.  No rashes, bruises or lacerations noted  Neuro: Alert and oriented X4.  CN II-XII checked and intact.  5 out of 5 strength throughout.  DTR 2+ throughout.  FTN, HTS intact.  Sensation intact . negative Romberg.  Lymphatic: No edema or lymphadenopathy noted.  Psychiatric: Good mood congruent affect.  Thought form linear, content appropriate       Labs:   WBC 10.3  Hgb 8.7  Na 32  K 4.8    Imaging:   Thoracentesis CXR on 6/23/2021  FINDINGS:        Fluid was sent to the laboratory.     Fluid character: straw colored     IMPRESSION:     1. Ultrasound guided right sided diagnostic and therapeutic thoracentesis.     2. 2400 mL of fluid withdrawn.                  Problem Representation:  Mr. Sepulveda is a 75-year-old man with past medical history  remarkable for ESRD on iHD via left arm fistula TTS, HFrEF (EF 30% 5/12/2021), PAD s/p B/L BKA (left 5/28/2021), IDDM 2 (A1c 5.7 5/11/2021), CAD status post multi stent placement with 80% ostial in-stent restenosis 5/18/2021 complicated by distal RCA disease and steal syndrome with digital DIP ischemia of the fourth finger admitted for hypoxemic respiratory failure 2/2 anasarca s/p hemodialysis, O2 demand worsening, pleural effusion recurred s/p thoracentesis, s/p vein ligation found to have axillary and subclavian occlusion causing steal phenomena on left arm.  Per vascular surgery OK to be discharged, Pending PT/OT recs for safe discharge. Palliative care consulted and will see patient tomorrow.           * Acute on chronic systolic congestive heart failure (HCC)- (present on admission)  Assessment & Plan  #Acute hypoxic respiratory failure secondary to  #Acute pleural effusion secondary to  #Hypervolemia in the setting of  #Acute on chronic CHF (reduced ejection fraction 30% 5/12/2021 TTE)    *Status post ultrafiltration in ED with symptomatic improvement  *Status post thoracentesis in ED; 1 L removed, fluid transudative.  Reports symptomatic improvement      Improving volume status, no orthopnea.     Plan  -continue carvedilol 3.125mg  -continue atorvastatin 40mg qdaily  -D/C furosemide, no yield  -Cont iHD per nephrology    -1500 mL fluid restriction  -Low-sodium renal diet              Head trauma  Assessment & Plan  #Ground-level fall complicated by  #Suspicion for right femoral neck fracture in the setting of  #Renal osteodystrophy- BMD     *CT was ordered, patient refused/declined to undergo test  -No further interventions at this time to address fracture  -Continue calcitriol 0.25 MCG daily        Paroxysmal A-fib (HCC)- (present on admission)  Assessment & Plan  #ESRD on HD    Stable. At sinus rhtyhm    Plan  -continue carvediolol 3.125mg  -continue apixaban 5 mg BID per pharmacy dosing.     Type 2  diabetes mellitus with kidney complication, with long-term current use of insulin (HCC)- (present on admission)  Assessment & Plan  #IDDM 2  #PAD s/p b/l BKA    Plan.  -Continue glargine 10 units nightly  -SSI  -Lispro insulin 3 units preprandial               CAD (coronary artery disease)- (present on admission)  Assessment & Plan  #CAD  #HLD  #ESRD on HD    -continue Carvedilol 3.125mg qdaily  -Atorvastatin 40mg qdaily     Pleural effusion  Assessment & Plan  S/p thoracentesis 6/18.  Repeat CXR 6/21 worsened appearance of pleural effusion    -Repeat thoracentesis at the bedside by IR 6/23/2021        Ischemia of finger  Assessment & Plan  Digital finger ischemia 2/2 AVF steal from brachiocephalic.     Plan  -Status post revision by vascular surgery      Hyperthyroidism- (present on admission)  Assessment & Plan  TSH 1 month ago 2.14    Plan  -Continue methimazole BID;   -Follow up with endocrinology for tapering and hoping to discontinue if hyperthyroidism resolved.   -follow up LFTs   -Do not order Iodine contrast imaging if not needed.

## 2021-06-24 NOTE — PROGRESS NOTES
Daily Progress Note:     Date of Service: 6/23/2021  Primary Team: [facility] Blue Team   Attending: LOVE Tucker*   Senior Resident: Dr. Mendoza  Intern: Dr. Ac  Contact:  [phone]    Chief Complaint:   GLF, shortness of breath    ID:  Mr. Sepulveda is a 75-year-old man with past medical history remarkable for ESRD on iHD via left arm fistula TTS, HFrEF (EF 30% 5/12/2021), PAD s/p B/L BKA (left 5/28/2021), IDDM 2 (A1c 5.7 5/11/2021), CAD status post multi stent placement with 80% ostial in-stent restenosis 5/18/2021 complicated by distal RCA disease and steal syndrome with ischemic wounds to the fourth finger was brought by ambulance status post ground-level fall.  Patient was recently admitted for pneumonia, treated with Levaquin.  Per wife in the last 2 weeks patient's appeared more confused and short of breath.  Of note, his hemodialysis sessions were curtailed in the last week in the setting of hypotension during dialysis.  Patient underwent right-sided thoracentesis of 1000 mL guided by ultrasound; resulted in transudative fluid    Objective  Pt reports his breathing is improving. O2 demands are 3 liter of O2. PT was evaluated for candidacy for thoracentesis 6/22; no appropriate window was identified.    Interval history    -Had HD on 6/19 with UF of 2.1L   -gave 1x trial of furosemide 80mg this morning, no urine output.  -Per nephrology will re-assess if doing HD tomorrow.     Ins/ Out: PO 640mls/Out per HD 2.6L net of -2.1L    Consultants/Specialty:  Nephrology    Review of Systems:    Review of Systems   Constitutional: Negative for chills, fever and malaise/fatigue.   Respiratory: Positive for cough, sputum production and shortness of breath. Negative for hemoptysis and wheezing.    Gastrointestinal: Negative for nausea and vomiting.   Genitourinary: Negative for flank pain, frequency and hematuria.        Anuria   Musculoskeletal: Negative for falls and neck pain.   Neurological: Negative  for dizziness and weakness.   Psychiatric/Behavioral: The patient is not nervous/anxious and does not have insomnia.        Objective Data:   Physical Exam:   Vitals:   Temp:  [36.6 °C (97.8 °F)-37.6 °C (99.7 °F)] 37.2 °C (99 °F)  Pulse:  [] 62  Resp:  [15-22] 16  BP: ()/(29-98) 108/37  SpO2:  [92 %-97 %] 92 %     General: Elderly man laying in bed in no acute distress  HEENT: NC/AT.  PERRLA, EOMI.  External ear normal.  Nares patent, nonedematous nonerythematous.  Moist mucous membranes. Good dentition.  Trachea midline.   Neck: No JVP.  Carotid bruit could not be appreciated.  Nontender, nonnodular thyroid.  No anterior or posterior cervical, occipital, supraclavicular lymphadenopathy  Cardiovascular: PMI<2 cm at fifth costal space at midclavicular line.  No heaves appreciated.  No thrills.  RRR W/O M, R, G.  Pulmonary: Normal work of breathing.  Resonant to percussion.  Ronchi heard in 10 lung fields  Abdomen: Flat, soft, nondistended.  Normoactive bowel sounds.  Nontender to percussion or palpation.  No hepatosplenomegaly noted.  No fluid wave  Musculoskeletal: Bilateral BKA's.  Left BKA wrapped in bandages.  Right stump healthy.  Ischemic changes to fourth finger, fistula patent  Skin: Warm and dry.  No rashes, bruises or lacerations noted  Neuro: Alert and oriented X4.  CN II-XII checked and intact.  5 out of 5 strength throughout.  DTR 2+ throughout.  FTN, HTS intact.  Sensation intact . negative Romberg.  Lymphatic: No edema or lymphadenopathy noted.  Psychiatric: Good mood congruent affect.  Thought form linear, content appropriate    Labs:   Recent Labs     06/21/21  1034 06/22/21  0304 06/23/21  0322 06/23/21  1210   WBC 9.5 8.6 7.5  --    RBC 2.43* 2.58* 2.38*  --    HEMOGLOBIN 8.0* 8.4* 7.9*  --    HEMATOCRIT 26.7* 28.2* 25.6*  --    .9* 109.3* 107.6*  --    MCH 32.9 32.6 33.2*  --    RDW 67.9* 67.0* 65.2*  --    PLATELETCT 279 276 261  --    MPV 9.2 9.0 8.9*  --    NEUTSPOLYS  --   74.60* 72.90*  --    LYMPHOCYTES  --  12.90* 13.10*  --    MONOCYTES  --  11.10 12.30  --    EOSINOPHILS  --  0.20 0.40 12   BASOPHILS  --  0.20 0.50  --      Recent Labs     06/21/21  1034 06/22/21  0304 06/23/21  0322   SODIUM 136 138 136   POTASSIUM 4.5 5.0 4.5   CHLORIDE  --  96 99   CO2  --  30 28   GLUCOSE  --  167* 149*   BUN 49* 53* 32*          Imaging:   No new imaging.   Problem Representation:    Mr. Sepulveda is a 75-year-old man with past medical history remarkable for ESRD on iHD via left arm fistula TTS, HFrEF (EF 30% 5/12/2021), PAD s/p B/L BKA (left 5/28/2021), IDDM 2 (A1c 5.7 5/11/2021), CAD status post multi stent placement with 80% ostial in-stent restenosis 5/18/2021 complicated by distal RCA disease and steal syndrome with digital DIP ischemia of the fourth finger admitted for hypoxemic respiratory failure 2/2 anasarca s/p hemodialysis, O2 demand worsening, pleural effusion recurred.         * Acute on chronic systolic congestive heart failure (HCC)- (present on admission)  Assessment & Plan  #Acute hypoxic respiratory failure secondary to  #Acute pleural effusion secondary to  #Hypervolemia in the setting of  #Acute on chronic CHF (reduced ejection fraction 30% 5/12/2021 TTE)    *Status post ultrafiltration in ED with symptomatic improvement  *Status post thoracentesis in ED; 1 L removed, fluid transudative.  Reports symptomatic improvement      Improving volume status, no orthopnea.     Plan  -continue carvedilol 3.125mg  -continue atorvastatin 40mg qdaily  -D/C furosemide, no yield  -Cont iHD per nephrology    -1500 mL fluid restriction  -Low-sodium renal diet              Pleural effusion  Assessment & Plan  S/p thoracentesis 6/18.  Repeat CXR 6/21 worsened appearance of pleural effusion    -Repeat thoracentesis at the bedside by IR 6/23/2021        Ischemia of finger  Assessment & Plan  Digital finger ischemia 2/2 AVF steal from brachiocephalic.     Plan  -Status post revision by vascular  surgery      Head trauma  Assessment & Plan  #Ground-level fall complicated by  #Suspicion for right femoral neck fracture in the setting of  #Renal osteodystrophy- BMD     *CT was ordered, patient refused/declined to undergo test  -No further interventions at this time to address fracture  -Continue calcitriol 0.25 MCG daily        Paroxysmal A-fib (HCC)- (present on admission)  Assessment & Plan  #ESRD on HD    Stable. At sinus rhtyhm    Plan  -continue carvediolol 3.125mg  -continue apixaban 5 mg BID per pharmacy dosing.     Type 2 diabetes mellitus with kidney complication, with long-term current use of insulin ([facility])- (present on admission)  Assessment & Plan  #IDDM 2  #PAD s/p b/l BKA    Plan.  -Continue glargine 10 units nightly  -SSI  -Lispro insulin 3 units preprandial               CAD (coronary artery disease)- (present on admission)  Assessment & Plan  #CAD  #HLD  #ESRD on HD    -continue Carvedilol 3.125mg qdaily  -Atorvastatin 40mg qdaily     Hyperthyroidism- (present on admission)  Assessment & Plan  TSH 1 month ago 2.14    Plan  -Continue methimazole BID;   -Follow up with endocrinology for tapering and hoping to discontinue if hyperthyroidism resolved.   -follow up LFTs   -Do not order Iodine contrast imaging if not needed.     T/L/D  1x PIV  Diet: diabetic and renal diet  GI prophylaxis: none  DVT prophylaxis: apixaban 5mg BID--  Code: DNAR/DNI  Dispo: oxygen evaluation for tomorrow, pending discharge tomorrow.

## 2021-06-24 NOTE — ANESTHESIA TIME REPORT
Anesthesia Start and Stop Event Times     Date Time Event    6/23/2021 1604 Ready for Procedure     1606 Anesthesia Start     1730 Anesthesia Stop        Responsible Staff  06/23/21    Name Role Begin End    Min CESIA Gutierrez M.D. Anesth 1606 1730        Preop Diagnosis (Free Text):  Pre-op Diagnosis     LUE ischemic steal syndrome, ESRD        Preop Diagnosis (Codes):    Post op Diagnosis  Steal syndrome as complication of dialysis access (HCC)      Premium Reason  A. 3PM - 7AM    Comments:

## 2021-06-24 NOTE — DISCHARGE PLANNING
Attempted to meet with patient to discuss skilled nursing choice - at dialysis.     1550 Patient back from dialysis. Discussed skilled nursing choice. Patient agrees that he needs skilled prior to discharge home. Verbal consent given as it was too painful for him to sign with his left arm after dialysis. Choice form faxed to [facility].     PLAN: Skilled nursing at time of discharge - referrals pending.

## 2021-06-25 NOTE — PROGRESS NOTES
"  ACUTE CARE VASCULAR SERVICE  PROGRESS NOTE    Events  6/24/2021: No acute events, pain in the left hand is about the same.  No specific complaints otherwise      Vitals  BP (!) 96/38 Comment: RN notified  Pulse 66   Temp 36.1 °C (96.9 °F) (Temporal)   Resp 15   Ht 1.676 m (5' 6\")   Wt 74.9 kg (165 lb 2 oz)   SpO2 95%   BMI 26.65 kg/m²       Exam  General: alert, conversant, NAD  Chest: good air entry, left IJ tunneled dialysis catheter site clean dry and intact  Heart: RRR  Abdomen: benign  Extremities: Left arm incision clean dry and intact, examination of the hand is basically stable no better or worse than yesterday        Labs  WBC normal   Hgb stable around 8    A/P)  6/23/2021: Ligation left upper extremity fistula for ischemic steal syndrome, placement of left IJ tunneled dialysis catheter    No specific concerns.    Ligation of the fistula maximizes the amount of arterial perfusion we can supply to his left hand.  If it does not improve the patient is at risk for losing part or potentially all of several fingers.  At this point no additional surgical procedures can be done to help improve the condition of the hand.  We will need to allow 1 to 2 weeks of time to elapse before we can determine if his hand is improving or not.    There is the possibility of thigh graft placement which can be done electively as an outpatient, otherwise the patient will be catheter dependent.  There are no dialysis access options in the right upper extremity.    Patient can be discharged and follow-up with me in 1 to 2 weeks for progress check    John Nguyen MD  [facility] (General and Vascular Surgery)  Cell: [phone] (text or call is fine, if you don't reach me please try my office)  Office: [phone]  __________________________________________________________________  Patient:Luis Sepulveda   MRN:[id number]   CSN:[id number]    6/24/2021    6:56 PM    "

## 2021-06-25 NOTE — NON-PROVIDER
"[facility] -  PROGRESS NOTE               Author: Marlee Callaway, Student Date & Time: 6/25/2021  10:32 AM     HPI:  The patient is a 75-year-old gentleman with history of end-stage renal disease on hemodialysis Tuesday, Thursday and Saturday at [facility], presented to [facility] with ground-level fall from his wheelchair and complaints of dyspnea, tachypnea and hypoxia requiring supplemental oxygen.  He normally resides at a skilled nursing facility.  The patient's last dialysis was on Thursday.  He had a full treatment and it was uneventful, but   prior to that the last two treatments were cut short secondary to hypotension.  Chest x-ray in the emergency room here shows a large right pleural effusion.  The patient states that he has been coughing with productive sputum over the past several days, getting progressively worse.  He has no fevers or chills.  Nephrology was asked to see him for his ESRD and dialysis management and ultrafiltration for volume overload.    DAILY NEPHROLOGY SUMMARY:  6/19 - S/p PUF yesterday with net UF of 3 L. He had thoracentesis yesterday as well with 1000 mL drained. He feels better. HD today.  6/20 - HD yest, UF 2.1L. C/O SOB, congestion. Does not want HD today. Is agreeable to trial of IV lasix and possibly dialysis tomorrow.    6/21 - Sitting up at Bedside eating breakfast. Improved SOB. C/o cough with sputum production. No UOP recorded last 24 hrs.  Would like to go to home vs. Rehab.   6/22: 2.5 L Net UF. 3L NC. C/o R upper CP on inspiration. Thoracentesis today at BS. Primary RN @ BS. C/o cont. Numbness/tingling in L Hand.   6/23: 1430 mL Net UF. Sitting up at side of bed on phone call with brother.  Decreased SOB. C/o congestion. 3.5 L NC.   6/24: Pt in bed, says he is \"sore all over\", yesterday he underwent LUE AVF ligation, RUE venogram and L IJ TDC placement with Dr. Nguyen, also had R sided thoracentesis yesterday with 2.4L " "removed, labs reviewed, VSS on 3L NC  6/25: Pt in bed, no acute distress.  Reports improvement of SOB after thoracentesis. HD yesterday, hypotensive, 0 UF.    REVIEW OF SYSTEMS:    10 point ROS reviewed and is as per HPI/daily summary or otherwise negative    PMH/PSH/SH/FH: Reviewed and unchanged since admission note  CURRENT MEDICATIONS: Reviewed from admission to present day    VS:  BP (!) 92/39 Comment: RN notified  Pulse 88   Temp 36.9 °C (98.4 °F) (Temporal)   Resp 18   Ht 1.676 m (5' 6\")   Wt 71.3 kg (157 lb 3 oz)   SpO2 92%   BMI 25.37 kg/m²   Physical Exam  Vitals and nursing note reviewed.   Constitutional:       General: He is not in acute distress.  HENT:      Head: Normocephalic and atraumatic.   Neck:      Comments: HD catheter in place with tegaderm in LIJ.  Dressing dry, no erythema or swelling.  Pulmonary:      Effort: Pulmonary effort is normal. No respiratory distress.      Comments: Fine crackles bilaterally in bases  Musculoskeletal:      Comments: Bilateral BKA.  Left stump healed, no erythema or swelling.  Right stump with surgical sutures, dry, no erythema or swelling.   Skin:     General: Skin is warm and dry.   Neurological:      Mental Status: He is alert.         Fluids:  In: 740 [P.O.:240; Dialysis:500]  Out: 500     LABS:  Recent Labs     06/23/21  0322 06/24/21  0223   SODIUM 136 132*   POTASSIUM 4.5 4.8   CHLORIDE 99 96   CO2 28 25   GLUCOSE 149* 135*   BUN 32* 51*   CREATININE 4.61* 6.03*   CALCIUM 8.7 9.0       IMPRESSION:    # End-stage renal disease, on hemodialysis Tuesday, Thursday and Saturday at   [facility]              - L AVF, s/p ligation 6/23 with Dr. Nguyen              - Now had L IJ TDC placed 6/23  # Chronic hypotension              - On midodrine 10 mg TID   # Peripheral vascular disease, severe  # Volume overload/shortness of breath/pleural effusion on the right              -s/p R thoracentesis with 2.4L removed 6/23   # Cardiomyopathy with an ejection " fraction of 15-20%  # History of 80% ostial LAD in-stent restenosis  # History of steal syndrome, status post DRIL procedure              - s/p ligation LURATNA AVF 6/23   # Atrial fibrillation, rate controlled               - On Eliquis and carvedilol   # Diabetes mellitus  # Hyperthyroidism, on Tapazole   # Anemia in CKD, below target               - Goal Hgb 10-11              - ARMANDO with HD  # CAD              - On statin/plavix   # MBD-CKD              - Managed at OP unit              - On calcitriol and sevelamer    PLAN:  -Continue maintenance HD qTTS/PRN, HD yesterday, hypotensive, 0 UF.  -UF as able  -Stop ACEi  -Continue midodrine 10 mg TID   -No dietary protein restrictions  -Renal/low sodium/diabetic diet  -ARMANDO with HD, goal Hgb 10-11   -Transfuse PRN Hgb <7   -Avoid nephrotoxins  -Renal dose medications for ESRD   -Max dose gabapentin in ESRD is 300 mg/day  -Continue calcitriol and sevelamer with meals   -Ongoing GOC discussions; pt is at high risk for further morbidity/mortality  -Follow labs

## 2021-06-25 NOTE — DISCHARGE PLANNING
Agency/Facility Name: [facility]  Spoke To: Jose  Outcome: Jose stated pt is accepted but will not have a bed until 7/7/21

## 2021-06-25 NOTE — DISCHARGE PLANNING
Agency/Facility Name: [facility]  Spoke To: Alanna  Outcome: Alanna stated that pt has a bed available 6/28. Alanna stated that if there is a d/c at [facility] she will notify Case Management for an earlier bed

## 2021-06-25 NOTE — OP REPORT
ACUTE CARE VASCULAR SERVICE  OPERATIVE NOTE      DATE OF SERVICE:  06/23/2021        PREOPERATIVE DIAGNOSES:    1.  Left upper extremity ischemic steal syndrome from left upper arm fistula   with gangrene of the second, third and fourth fingers of the left hand.  2.  End-stage renal disease, on dialysis.     POSTOPERATIVE DIAGNOSES:    1.  Left upper extremity ischemic steal syndrome from left upper arm fistula   with gangrene of the second, third and fourth fingers of the left hand.  2.  End-stage renal disease, on dialysis.     PROCEDURES:    1.  Ligation of left upper extremity AV fistula.  2.  Insertion of left internal jugular vein tunneled hemodialysis catheter   with fluoroscopy.  3.  Percutaneous access of the left internal jugular vein with ultrasound   guidance.     SURGEON:  John Nguyen M.D.     ASSISTANT:  None.     ANESTHESIA:  Local plus sedation.     ESTIMATED BLOOD LOSS:  Minimal.     FINDINGS:  Ligation of the fistula did yield an improved Doppler signal in the   left wrist indicating improved perfusion of the left hand.     COMPLICATIONS:  None.     DISPOSITION:  The patient tolerated the procedure well.  He was sent to   recovery in stable condition.     INDICATIONS:  The patient is a 75-year-old male who has been on dialysis for   many years.  He has a left upper arm fistula, which has been in place for some   time and over the last several months, the patient has developed increasing   pain in the left hand and skin breakdown advancing now to the point of   gangrene of the tips of the second, third and fourth fingers.  It was   identified that these symptoms were related to ischemic steal and so the   patient underwent a DRIL procedure in hopes of improving the blood flow to the   left hand.  The DRIL procedure itself was successful; however, the patient's   hand continued to get worse and therefore, we had a curly discussion about   ligating the fistula to give him every bit of blood  flow, possible for the   left hand to improve.  Also, if we ligate his fistula he would need a tunneled   hemodialysis catheter for his dialysis access and also we would look for   other potential options for dialysis access creation such as a fistula or   graft in the right arm.  The patient understood this information.  His   questions were answered and he agreed to proceed and informed consent was   obtained.     DESCRIPTION OF PROCEDURE:  The patient was placed supine on the operating   table and IV sedation was given to make the patient comfortable.  The patient   was prepped and draped sterile.  Surgical timeout was called.  The patient   received preoperative antibiotics.     Local anesthetic was used for local block.  A stab incision was made over the target vein.  The vein was accessed with ultrasound guidance and the wire passed easily and correct location of the wire was verified with fluoroscopy.  The new catheter was tunneled from a separate stab incision to the wire insertion point.  The venotomy was dilated up to the peel-away sheath.  The catheter was then placed through the peel-away sheath into the vena cava and correct position was verified with fluoroscopy.  The skin incision was closed with subcuticular absorbable suture and a dressing was placed. The catheter aspirated and flushed well. It was anchored in place with nylon suture.  It was flushed and then locked with concentrated heparin and then a Bio Patch bandage was applied as well as caps on the catheter.     At this point I said about evaluating the left arm fistula.  I began with a Doppler assessment of the left wrist with the fistula patent and the Doppler signal was relatively weak, and then I repeated the Doppler assessment with the fistula compressed and the signal increased significantly.  At this point I injected local anesthetic over the proximal portion of the fistula and I dissected out the fistula circumferentially.  The fistula  appeared to be about 6 mm in diameter.  I attempted to partially constrict the fistula and achieved improve Doppler evaluation at the wrist however it did not appear that any partial constriction of the fistula would improve the perfusion of the hand without causing fistula thrombosis, and therefore I decided to perform a complete ligation of the fistula.  The fistula was ligated with an 0 Prolene suture and an orange clip.  The incision was closed with interrupted 3-0 Vicryl sutures and a running 4-0 Vicryl subcuticular suture and then protected with skin glue.    Patient tolerated the procedure well.  All counts were correct.  He was sent to recovery in stable condition.    Postoperative plan:  For the time being the patient will use his left IJ tunneled dialysis catheter for dialysis access.  He may potentially be a candidate for a thigh AV graft and we will discuss this at his follow-up visit.        ______________________________  MD ALFREDA Fagan/SUP    DD:  06/25/2021 02:06  DT:  06/25/2021 03:23    Job#:  [id number]

## 2021-06-25 NOTE — PALLIATIVE CARE
Palliative Care follow-up  Appreciate update form MD noting new referral. MD updated on this RN's prior discussions with [facility]. Formal consult to follow 6/25.      Updated: MD    Plan: formal consult 6/25    Thank you for allowing Palliative Care to support this patient and family. Contact [phone] for additional assistance, change in patient status, or with any questions/concerns.

## 2021-06-25 NOTE — PROGRESS NOTES
Monitor Summary:   SR [id number]  Converted to AF @1323    22/15/55  BBB, 1st° HB, bigem, FPVC, coup

## 2021-06-25 NOTE — THERAPY
Physical Therapy   Initial Evaluation     Patient Name: Luis Sepulveda  Age:  75 y.o., Sex:  male  Medical Record #: [id number]  Today's Date: 6/25/2021     Precautions: Fall Risk    Assessment  Mr. Sepulveda is a 76 y/o male who presents to acute secondary to fall at [facility] where he was receiving rehabilitation for recent BKA.  Found to have acute hypoxic respiratory failure and acute PE, acute on chronic HF, L UE ischemic steal syndrome s/p venogram and vein ligation with catheter insertion, and head trauma. Was suspicion for R femoral neck fracture, however patient refused testing thus no further interventions are taking place. PMH of ESRD with weekly dialysis. Pt with significant pain at L BKA site and L UE. Pt also lacking knee extension in B LEs, reports he had an immobilizer for the L LE but doesn't recall where it is. Recommend immobilizer for night for L LE to prevent flexion contracture at the knee. Unable to perform slide board transfers as pain in L UE was too great, however pt was agreeable to demonstrate lateral scoot along bed. Based on current mobility recommend return to [facility] to continue his [facility]. Acute PT to continue to follow.    Plan    Recommend Physical Therapy 3 times per week until therapy goals are met for the following treatments:  Bed Mobility, Gait Training, Neuro Re-Education / Balance, Therapeutic Activities and Therapeutic Exercises    DC Equipment Recommendations: Unable to determine at this time  Discharge Recommendations: Recommend post-acute placement for additional physical therapy services prior to discharge home          Objective       06/25/21 0859   Prior Living Situation   Prior Services Intermittent Physical Support for ADL Per Service   Housing / Facility Skilled Nursing Facility   Steps Into Home 0   Equipment Owned Front-Wheel Walker;Wheelchair   Lives with - Patient's Self Care Capacity Spouse;Adult Children   Comments At baseline lives at home  with spouse and son. Has been at SNF since recent BKA   Prior Level of Functional Mobility   Bed Mobility Independent   Transfer Status Independent   Ambulation Independent   Distance Ambulation (Feet) 30   Assistive Devices Used Front-Wheel Walker   Wheelchair Independent   Comments Prior to recent L CLOVIS was ambulating very short household distances. Since CLOVIS has been at [facility] to improve mobility   Passive ROM Lower Body   Comments lacking 15 degrees extension L knee, 5 degrees R knee   Active ROM Lower Body    Comments ROM restrictions as above    Strength Lower Body   Comments R quad 3-/5, L quad 2+/5, all other WFL   Sensation Lower Body   Comments hypersensitivity noted in L residual limb   Balance Assessment   Sitting Balance (Static) Fair   Sitting Balance (Dynamic) Fair -   Weight Shift Sitting Fair   Gait Analysis   Gait Level Of Assist Unable to Participate   Bed Mobility    Supine to Sit Moderate Assist   Sit to Supine   (at EOB)   Scooting Minimal Assist   Functional Mobility   Sit to Stand Unable to Participate   Short Term Goals    Short Term Goal # 1 Pt will perform supine <> sit with SPV in 6 visits to get in/out of bed   Short Term Goal # 2 Pt will perform functional transfers with SPV in 6 visits to increase independence   Short Term Goal # 3 Pt will propel w/c 100ft with SPV in 6 visits to increase independence

## 2021-06-25 NOTE — CARE PLAN
Problem: Fall Risk  Goal: Patient will remain free from falls  Outcome: Progressing     Problem: Pain - Standard  Goal: Alleviation of pain or a reduction in pain to the patient’s comfort goal  Outcome: Progressing   The patient is Watcher - Medium risk of patient condition declining or worsening    Shift Goals  Clinical Goals: blood sugar  Patient Goals: rest, pain control  Family Goals: NA    Progress made toward(s) clinical / shift goals:  free of falls    Patient is not progressing towards the following goals:

## 2021-06-25 NOTE — THERAPY
"Occupational Therapy   Initial Evaluation     Patient Name: Luis Sepulveda  Age:  75 y.o., Sex:  male  Medical Record #: [id number]  Today's Date: 6/25/2021     Precautions  Precautions: Fall Risk  Comments: new BKA on 5/28, L UE revascularization, unclear WB     Assessment  Patient is 75 y.o. male admitted following GLF at SNF, acute on chronic CHF, and ischemic wounds to left 3rd/4th/5th fingers s/p ligation LUE fistula for ischemic steal syndrome and placement of left IJ tunneled dialysis catheter. Pt had been at SNF prior to admission, before recent admission patient living in a [facility] with spouse and son who work during the day, pt normally independent with ADLs, required assist for IADLs. Pt required Simone for bed mobility, limited to EOB due to lack of prosthetic and pain in left hand (dominant hand). Pt would benefit from continued skilled therapy while admitted as well as recommend post-acute placement at this time.    Plan    Recommend Occupational Therapy 3 times per week until therapy goals are met for the following treatments:  Adaptive Equipment, Self Care/Activities of Daily Living, Therapeutic Activities and Therapeutic Exercises.    DC Equipment Recommendations: (P) Unable to determine at this time  Discharge Recommendations: (P) Recommend post-acute placement for additional occupational therapy services prior to discharge home     Subjective    \"I can't really do anything with this hand, it hurts too much\"     Objective       06/25/21 0855   Prior Living Situation   Prior Services Intermittent Physical Support for ADL Per Service   Housing / Facility Skilled Nursing Facility   Steps Into Home 0   Equipment Owned Front-Wheel Walker;Wheelchair   Lives with - Patient's Self Care Capacity Spouse;Adult Children   Comments At baseline lives at home with spouse and son, has been at SNF since recent BKA   Prior Level of ADL Function   Self Feeding Independent   Grooming / Hygiene Independent   Bathing " Independent   Dressing Independent   Toileting Independent   Prior Level of IADL Function   Medication Management Independent   Laundry Requires Assist   Kitchen Mobility Requires Assist   Finances Requires Assist   Home Management Requires Assist   Shopping Requires Assist   Prior Level Of Mobility Independent With Device in Home   Driving / Transportation Relatives / Others Provide Transportation   Occupation (Pre-Hospital Vocational) Retired Due To Age   History of Falls   History of Falls Yes   Date of Last Fall   (reason for admit)   Precautions   Precautions Fall Risk   Comments BKA 5/28, LUE revascularization, no WB order    Cognition    Cognition / Consciousness WDL   Level of Consciousness Alert   Comments Very pleasant, cooperative   Active ROM Upper Body   Active ROM Upper Body  WDL   Dominant Hand Left   Strength Upper Body   Upper Body Strength  X   Comments LUE distally limited due to pain   Sensation Upper Body   Upper Extremity Sensation  X   Lt Upper Extremity Light Touch Impaired   Upper Body Muscle Tone   Upper Body Muscle Tone  WDL   Neurological Concerns   Neurological Concerns No   Coordination Upper Body   Coordination X   Fine Motor Coordination LUE   Balance Assessment   Sitting Balance (Static) Fair   Sitting Balance (Dynamic) Fair   Standing Balance (Static)   (NT- no prosthetic)   Standing Balance (Dynamic)   (NT- no prosthetic)   Weight Shift Sitting Fair   Bed Mobility    Supine to Sit Minimal Assist   Sit to Supine Minimal Assist   Scooting Supervised   Rolling Supervised   ADL Assessment   Grooming Supervision;Seated   Upper Body Dressing Minimal Assist   Lower Body Dressing   (NT-prosthetic available)   Toileting   (NT-refused need)   How much help from another person does the patient currently need...   Putting on and taking off regular lower body clothing? 3   Bathing (including washing, rinsing, and drying)? 3   Toileting, which includes using a toilet, bedpan, or urinal? 2    Putting on and taking off regular upper body clothing? 3   Taking care of personal grooming such as brushing teeth? 4   Eating meals? 4   6 Clicks Daily Activity Score 19   Functional Mobility   Sit to Stand Unable to Participate   Bed, Chair, Wheelchair Transfer Unable to Participate   Mobility bed mobility, EOB, BTB   Comments no prosthetic available   Visual Perception   Visual Perception  Not Tested   Activity Tolerance   Sitting Edge of Bed 15   Patient / Family Goals   Patient / Family Goal #1 To go home   Short Term Goals   Short Term Goal # 1 Pt will complete ADL transfers with supervision   Short Term Goal # 2 Pt will complete LB dressing with supervision   Short Term Goal # 3 Pt will complete toileting with supervision   Education Group   Education Provided Role of Occupational Therapist   Role of Occupational Therapist Patient Response Patient;Acceptance;Explanation   Problem List   Problem List Decreased Active Daily Living Skills;Decreased Homemaking Skills;Decreased Upper Extremity AROM Left;Decreased Upper Extremity Strength Left;Decreased Functional Mobility;Decreased Activity Tolerance   Interdisciplinary Plan of Care Collaboration   IDT Collaboration with  Nursing   Patient Position at End of Therapy In Bed;Bed Alarm On;Call Light within Reach;Tray Table within Reach;Phone within Reach   Collaboration Comments RN updated

## 2021-06-25 NOTE — DISCHARGE PLANNING
Agency/Facility Name: [facility]  Spoke To: Cherie  Outcome: Cherie stated that pt is declined due to hemodialysis and a lack of long term care bed

## 2021-06-25 NOTE — PROGRESS NOTES
"[facility] Nephrology Daily Progress Note     Date of Service  6/25/2021    Chief Complaint  The patient is a 75-year-old gentleman with history of end-stage renal disease on hemodialysis Tuesday, Thursday and Saturday at [facility], presented to [facility] with complaints of dyspnea, tachypnea and hypoxia requiring supplemental oxygen.  He normally resides at a skilled nursing facility.  The patient's last dialysis was on Thursday.  He had a full treatment and it was uneventful, but   prior to that the last two treatments were cut short secondary to hypotension.  Chest x-ray in the emergency room here shows a large right pleural effusion.  The patient states that he has been coughing with productive sputum over the past several days, getting progressively worse.  He has no fevers or chills.  Nephrology was asked to see him for his ESRD and dialysis management and ultrafiltration for volume overload.    Interval Problem Update  6/19 - S/p PUF yesterday with net UF of 3 L. He had thoracentesis yesterday as well with 1000 mL drained. He feels better. HD today.  6/20 - HD yest, UF 2.1L. C/O SOB, congestion. Does not want HD today. Is agreeable to trial of IV lasix and possibly dialysis tomorrow.    6/21 - Sitting up at Bedside eating breakfast. Improved SOB. C/o cough with sputum production. No UOP recorded last 24 hrs.  Would like to go to home vs. Rehab.   6/22: 2.5 L Net UF. 3L NC. C/o R upper CP on inspiration. Thoracentesis today at [facility]. Primary RN @ [facility]. C/o cont. Numbness/tingling in L Hand.   6/23: 1430 mL Net UF. Sitting up at side of bed on phone call with brother.  Decreased SOB. C/o congestion. 3.5 L NC.   6/24: Pt in bed, says he is \"sore all over\", yesterday he underwent LUE AVF ligation, RUE venogram and L IJ TDC placement with Dr. Nguyen, also had R sided thoracentesis yesterday with 2.4L removed, labs reviewed, VSS on 3L NC  6/25: Pt resting in bed, comfortable, iHD yesterday " with 0 UF, SNF planning underway, pt met with PC yesterday and is not currently interested in Hospice, VSS on 3L NC    Review of Systems  ROS   10-point ROS performed and is as HPI/daily summary.    Physical Exam  Temp:  [36 °C (96.8 °F)-36.9 °C (98.4 °F)] 36.9 °C (98.4 °F)  Pulse:  [] 88  Resp:  [13-18] 18  BP: ()/() 92/39  SpO2:  [91 %-97 %] 92 %    Physical Exam  GENERAL:  He is in no acute hemodynamic distress.    HEENT:  Atraumatic, normocephalic.  Pupils are equal and round, reactive to   light.  NECK:  Moderate jugular venous distention noted.  Supple.  CHEST: Diminished t/o.  CARDIAC:  S1, S2 heard. L AVF s/p ligation, L IJ TDC CDI  PULMONARY: No rhonchi, rales or wheezes noted. Diminished t/o   ABDOMEN:  Soft, nontender, nondistended.  Bowel sounds are present.  EXTREMITIES:  L hand with necrosis of 2nd,3rd and 4th fingers with ulcerations. He has bilateral BKAs, no edema. Some mild erythema to L stump.  NEUROLOGIC:  Cranial nerves are intact.  SKIN: Generalized ecchymosis, wounds to L fingers as described above  PSYCHIATRIC:  No anxiety. Judgment is intact.    Fluids    Intake/Output Summary (Last 24 hours) at 6/25/2021 1048  Last data filed at 6/25/2021 0920  Gross per 24 hour   Intake 980 ml   Output 950 ml   Net 30 ml       Laboratory  Recent Labs     06/23/21 0322 06/24/21 0223   WBC 7.5 10.3   RBC 2.38* 2.60*   HEMOGLOBIN 7.9* 8.7*   HEMATOCRIT 25.6* 26.6*   .6* 102.3*   MCH 33.2* 33.5*   MCHC 30.9* 32.7*   RDW 65.2* 59.1*   PLATELETCT 261 339   MPV 8.9* 8.7*     Recent Labs     06/23/21 0322 06/24/21 0223   SODIUM 136 132*   POTASSIUM 4.5 4.8   CHLORIDE 99 96   CO2 28 25   GLUCOSE 149* 135*   BUN 32* 51*   CREATININE 4.61* 6.03*   CALCIUM 8.7 9.0         No results for input(s): NTPROBNP in the last 72 hours.             IMAGING:   DX-CHEST-2 VIEWS  Order: [id number]  Status:  Final result   Visible to patient:  Yes (MyChart) Next appt:  06/25/2021 at 02:00 PM in  Cardiology (TABATHA Laws)   0 Result Notes  Details    Reading Physician Reading Date Result Priority   Cesar Parada M.D.  [phone] 6/21/2021 Routine      Narrative & Impression     6/21/2021 10:57 AM     HISTORY/REASON FOR EXAM:  Shortness of Breath.        TECHNIQUE/EXAM DESCRIPTION AND NUMBER OF VIEWS:  Two views of the chest.     COMPARISON:  6/18/2021     FINDINGS:     Cardiomediastinal silhouette is stable.     Very large right pleural effusion which has increased in size from prior study. There is associated compressive atelectasis of the right lung. Correlate clinically for infection. No pneumothorax.     No acute osseous abnormality.     IMPRESSION:     Large right pleural effusion which has increased in size from prior study.        Available labs, imaging and clinical documentation reviewed.     ASSESSMENT:  # End-stage renal disease, on hemodialysis Tuesday, Thursday and Saturday at   [facility]   - L AVF, s/p ligation 6/23 with Dr. Nguyen   - Now had L IJ TDC placed 6/23  # Chronic hypotension   - On midodrine 10 mg TID   # Peripheral vascular disease, severe   - s/p bilateral LE amputations  # Volume overload/shortness of breath/pleural effusion on the right, improved    -s/p R thoracentesis with 2.4L removed 6/23   # Cardiomyopathy with an ejection fraction of 15-20%  # History of 80% ostial LAD in-stent restenosis  # History of steal syndrome, status post DRIL procedure   - s/p ligation LUE AVF 6/23   # Atrial fibrillation, rate controlled    - On Eliquis and carvedilol   # Diabetes mellitus  # Hyperthyroidism, on Tapazole   # Anemia in CKD, below target    - Goal Hgb 10-11   - ARMANDO with HD  # CAD   - On statin/plavix   # MBD-CKD   - Managed at OP unit   - On calcitriol and sevelamer     SUGGESTIONS:   -Continue maintenance HD qTTS/PRN, treatment due tomorrow (SAT)  -UF as able  -Stopped ACEi 6/24  -Continue midodrine 10 mg TID   -No dietary protein restrictions  -Renal/low  sodium/diabetic diet  -ARMANDO with HD, goal Hgb 10-11   -Transfuse PRN Hgb <7   -Avoid nephrotoxins  -Renal dose medications for ESRD   -Max dose gabapentin in ESRD is 300 mg/day  -Continue calcitriol and sevelamer with meals   -Ongoing GOC discussions; pt is at high risk for further morbidity/mortality  -Pt declines Hospice at this time  -Needs OP FU with Vascular in 1-2 weeks. Consideration for thigh graft placement as OP.  -Follow labs  -OK to transition to OP HD once medically cleared. SNF planning underway.    Thank you,

## 2021-06-25 NOTE — CARE PLAN
The patient is Watcher - Medium risk of patient condition declining or worsening    Shift Goals  Clinical Goals: blood sugar  Patient Goals: rest, pain control  Family Goals: NA    Progress made toward(s) clinical / shift goals:  Patient remains free from falls. Skin remains intact with mepilex replaced.     Patient is not progressing towards the following goals:      Problem: Knowledge Deficit - Standard  Goal: Patient and family/care givers will demonstrate understanding of plan of care, disease process/condition, diagnostic tests and medications  Outcome: Not Progressing     Problem: Pain - Standard  Goal: Alleviation of pain or a reduction in pain to the patient’s comfort goal  Outcome: Not Progressing

## 2021-06-25 NOTE — CONSULTS
Reason for PC Consult: Advance Care Planning    Consulted by: Dr. Mendoza    Assessment:  General: 74 y/o male from SNF with fall from  and landing on his face. CT of the head was negative, but pt appeared fluid overloaded d/t suboptimal dialysis d/t HoTN. He required thoracentesis 6/23. Pt suffered MI about 2 years ago and has been dialysis dependent since. He has had various complications with dialysis access and now has ischemia/gangrene of the left 2-4 digits. This is worrisome to Adalid, as he fears of losing his dominant, left hand. Vascular surgery was consulted and placed a left IJ tunneled cath and completed ligation of the left arm fistula to attempt to restore blood flow to the left hand on 6/23. He has no options for dialysis access on the right side. PMHx of ESRD on HD at [facility] on [place] TTS, afib on Eliquis, DM, PVD s/p right and left BKA, hyperthyroidism.      Social: Pt resides with his ex-wife Indiana and son Miguel in [place]. He has a daughter Alivia in [place] and 5 grandkids. He has help from his ex-wife, son and grandson locally with transportation to/from HD, as well as basic needs at home. He recently got a prosthetic and learning to work with this to help with ADLs. He is a [place] and 80% service connected but does not [facility]    Consults: vascular, nephrology    Dyspnea: No- s/p thoracentesis  Last BM:  (pta)-    Pain: Yes- left hand d/t ischemia  Depression: Mood appropriate for situation-    Dementia: No;       Spiritual:  Is Gnosticism or spirituality important for coping with this illness? No-    Has a  or spiritual provider visit been requested? No    Palliative Performance Scale: 50%    Advance Directive: None-    DPOA: No- does not wish to complete   POLST: Yes-      Code Status: DNR-      Outcome:  PC RN met with Adalid at bedside and explained the role of PC to help with discussions about the current medical situation and goals moving forward.. He  "recalled this RN from prior visits. PC confirmed some of the information he shared prior, noting the plan for his daughter to relocated to [place] and the goal of building a home for the whole family. He tells PC that this is still the plan. His son Miguel and ex-wife [place] are still living in the home in [place] where he also resides. PC recalled how proud and complementary of his grandkids he was and how important they are to him, which he agreed with.    He was sitting at EOB and noted feeling more comfortable with his breathing, but endorsed extreme pain in his left hand still. His demeanor was overall more flat and withdrawn than this RN's prior experiences, and he made jokes noting \"I have to try to keep my sense of humor.\" He tells PC that overall he's doing \"okay.\" He notes having been at home a short time before returning to the hospital, then off to SNF where he came from on this admission. He tells PC that SNF is the possibly the best place for him given his needs and the set-up of their home. PC discussed his current medial situation. He expressed hope that his hand could be salvaged and he would not need surgery, especially given his left hand is his dominant one. He is aware of the plan to f/u with Dr. Nguyen in 1-2 weeks to evaluate his hand again. PC explored his feelings surrounding this and his preliminary thoughts on surgery. He tells PC that he has to \"weigh the cost, emotionally and physically, not financially.\" He tells PC he wants to be \"present\" meaning have awareness of his surroundings and be able to interact/enjoy his family. He is aware that all sorts of medical interventions and equipment are available to him, but acknowledges he has control over what this plan looks like for him. More than anything, Adalid wants to be around to watch his grandkids grow, but must be able to his wits about him to engage and interact, or this would not be QoL for him. He remains hopeful that this won't be necessary, " but plans to weigh the benefit/burden if so.     PC discussed the possibility of looking into a [facility] contracted facility given concerns of possible long-term placement and he is open to this. At this point, Adalid denied any questions/needs. While talking to him, PC RN provided therapeutic communication, including open ended questions, reflective listening, and normalization of thought and feelings throughout encounter, as well as reinforced his goals of care. PC contact information provided to Adalid and encouraged to call with any questions or concerns. Case discussed with LAZARO Bautista regarding potential DC options given Adalid's service connection.    Recommendations: I do not recommend an ethics or hospice consult at this time because pt not interested at this time.    Updated: MD LAZARO (Voalte sent; awaiting call)    Plan: follow and support with POC    Thank you for allowing Palliative Care to support this patient and family. Contact [phone] for additional assistance, change in patient status, or with any questions/concerns.

## 2021-06-25 NOTE — DISCHARGE PLANNING
Anticipated Discharge Disposition: SNF    Action: RN CM spoke with the patient at bedside to report that [facility] can take him back when he's medically clear.  RN CM asked if the patient wanted to send a referral to [facility] as well, patient consented to sending a referral to [facility].  JAN WILLIS asked JEREMY dang to add it to the choice form.    Barriers to Discharge: Medical clearance, placement     Plan: Case coordination to continue to follow up with medical team to discuss discharge barriers.

## 2021-06-25 NOTE — DISCHARGE PLANNING
Referral sent per choice to 1)[facility],2)[facility],3)[facility],4)[facility],5)[facility],6)[facility],7)[facility],8)[facility].

## 2021-06-25 NOTE — PROGRESS NOTES
Daily Progress Note:     Date of Service: 6/25/2021  Primary Team: [facility]   Attending: Radames Sommers M.D.   Senior Resident: Dr. Mendoza  Intern: Dr. Ac  Contact:  [phone]     Chief Complaint:   GLF, shortness of breath    Subjective  Mr. Sepulveda is a 75-year-old man with past medical history remarkable for ESRD on iHD via left arm fistula TTS, HFrEF (EF 30% 5/12/2021), PAD s/p B/L BKA (left 5/28/2021), IDDM 2 (A1c 5.7 5/11/2021), CAD status post multi stent placement with 80% ostial in-stent restenosis 5/18/2021 complicated by distal RCA disease and steal syndrome with ischemic wounds to the fourth finger was brought by ambulance status post ground-level fall.  Patient was recently admitted for pneumonia, treated with Levaquin.  Per wife in the last 2 weeks patient's appeared more confused and short of breath.  Of note, his hemodialysis sessions were curtailed in the last week in the setting of hypotension during dialysis.  Patient underwent right-sided thoracentesis of 1000 mL guided by ultrasound; resulted in transudative fluid     Objective  Pt reports his breathing is improving. O2 demands are 3 liter of O2. PT was evaluated for candidacy for thoracentesis 6/22; no appropriate window was identified.     Interval history     -Had HD on 6/19 with UF of 2.1L   -Doing another HD today    S/p therapeutic thoracentesis 2.4L   Consulted palliative care    Consultants/Specialty:  Nephrology  Review of Systems:      ROS  Review of Systems   Constitutional: Negative for chills, fever and malaise/fatigue.   Respiratory: Positive for cough, sputum production and improved shortness of breath. Negative for hemoptysis and wheezing.    Gastrointestinal: Negative for nausea and vomiting.   Genitourinary: Negative for flank pain, frequency and hematuria.        Anuria   Musculoskeletal: Negative for falls and neck pain.   Neurological: Negative for dizziness and weakness.   Psychiatric/Behavioral: The  patient is not nervous/anxious and does not have insomnia.       Objective Data:   Physical Exam:   Vitals:   Temp:  [36.1 °C (96.9 °F)-36.9 °C (98.4 °F)] 36.9 °C (98.4 °F)  Pulse:  [] 90  Resp:  [15-18] 17  BP: ()/() 92/43  SpO2:  [90 %-97 %] 90 %   Physical Exam  General: Elderly man laying in bed in no acute distress  HEENT: NC/AT.  PERRLA, EOMI.  External ear normal.  Nares patent, nonedematous nonerythematous.  Moist mucous membranes. Good dentition.  Trachea midline.   Neck: No JVP.  Carotid bruit could not be appreciated.  Nontender, nonnodular thyroid.  No anterior or posterior cervical, occipital, supraclavicular lymphadenopathy  Cardiovascular: PMI<2 cm at fifth costal space at midclavicular line.  No heaves appreciated.  No thrills.  RRR W/O M, R, G.  Pulmonary: Normal work of breathing.  Resonant to percussion.  Ronchi heard in 10 lung fields  Abdomen: Flat, soft, nondistended.  Normoactive bowel sounds.  Nontender to percussion or palpation.  No hepatosplenomegaly noted.  No fluid wave  Musculoskeletal: Bilateral BKA's.  Left BKA wrapped in bandages.  Right stump healthy.  Ischemic changes to fourth finger, fistula patent  Skin: Warm and dry.  No rashes, bruises or lacerations noted  Neuro: Alert and oriented X4.  CN II-XII checked and intact.  5 out of 5 strength throughout.  DTR 2+ throughout.  FTN, HTS intact.  Sensation intact . negative Romberg.  Lymphatic: No edema or lymphadenopathy noted.  Psychiatric: Good mood congruent affect.  Thought form linear, content appropriate       Labs:   WBC 10.3  Hgb 8.7  Na 32  K 4.8    Imaging:   Thoracentesis CXR on 6/23/2021  FINDINGS:        Fluid was sent to the laboratory.     Fluid character: straw colored     IMPRESSION:     1. Ultrasound guided right sided diagnostic and therapeutic thoracentesis.     2. 2400 mL of fluid withdrawn.                  Problem Representation:  Mr. Sepulveda is a 75-year-old man with past medical history  remarkable for ESRD on iHD via left arm fistula TTS, HFrEF (EF 30% 5/12/2021), PAD s/p B/L BKA (left 5/28/2021), IDDM 2 (A1c 5.7 5/11/2021), CAD status post multi stent placement with 80% ostial in-stent restenosis 5/18/2021 complicated by distal RCA disease and steal syndrome with digital DIP ischemia of the fourth finger admitted for hypoxemic respiratory failure 2/2 anasarca s/p hemodialysis, O2 demand worsening, pleural effusion recurred s/p thoracentesis, s/p vein ligation found to have axillary and subclavian occlusion causing steal phenomena on left arm.  Per vascular surgery OK to be discharged, Pending PT/OT recs for safe discharge. Palliative care consulted; GOC reviewed.        * Acute on chronic systolic congestive heart failure (HCC)- (present on admission)  Assessment & Plan  #Acute hypoxic respiratory failure secondary to  #Acute pleural effusion secondary to  #Hypervolemia in the setting of  #Acute on chronic CHF (reduced ejection fraction 30% 5/12/2021 TTE)    *Status post ultrafiltration in ED with symptomatic improvement  *Status post thoracentesis in ED; 1 L removed, fluid transudative.  Reports symptomatic improvement      Improving volume status, no orthopnea.     Plan  -continue carvedilol 3.125mg  -continue atorvastatin 40mg qdaily  -D/C furosemide, no yield  -Cont iHD per nephrology    -1500 mL fluid restriction  -Low-sodium renal diet              Pleural effusion  Assessment & Plan  S/p thoracentesis 6/18.  Repeat CXR 6/21 worsened appearance of pleural effusion    -Repeat thoracentesis at the bedside by IR 6/23/2021        Ischemia of finger  Assessment & Plan  Digital finger ischemia 2/2 AVF steal from brachiocephalic.     Plan  -Status post revision by vascular surgery      Head trauma  Assessment & Plan  #Ground-level fall complicated by  #Suspicion for right femoral neck fracture in the setting of  #Renal osteodystrophy- BMD     *CT was ordered, patient refused/declined to undergo  test  -No further interventions at this time to address fracture  -Continue calcitriol 0.25 MCG daily        Paroxysmal A-fib (HCC)- (present on admission)  Assessment & Plan  #ESRD on HD    Stable. At sinus rhtyhm    Plan  -continue carvediolol 3.125mg  -continue apixaban 5 mg BID per pharmacy dosing.     Type 2 diabetes mellitus with kidney complication, with long-term current use of insulin (HCC)- (present on admission)  Assessment & Plan  #IDDM 2  #PAD s/p b/l BKA    Plan.  -Continue glargine 10 units nightly  -SSI  -Lispro insulin 3 units preprandial               CAD (coronary artery disease)- (present on admission)  Assessment & Plan  #CAD  #HLD  #ESRD on HD    -continue Carvedilol 3.125mg qdaily  -Atorvastatin 40mg qdaily     Hyperthyroidism- (present on admission)  Assessment & Plan  TSH 1 month ago 2.14    Plan  -Continue methimazole BID;   -Follow up with endocrinology for tapering and hoping to discontinue if hyperthyroidism resolved.   -follow up LFTs   -Do not order Iodine contrast imaging if not needed.

## 2021-06-26 NOTE — PROGRESS NOTES
Report received. Patient oriented x4. Pain level 3 out of 10 on left fingers intermittently pain medication provided. Denies SOB. Repositioned patient. Fall risk interventions in place, bed in low position, and bed alarm on. Assessment completed.

## 2021-06-26 NOTE — PROGRESS NOTES
Daily Progress Note:     Date of Service: 6/26/2021  Primary Team: [facility]   Attending: Radames Sommers M.D.   Senior Resident: Dr. Mendoza  Intern: Dr. Ac  Contact:  [phone]     Chief Complaint:   GLF, shortness of breath    Subjective  Mr. Sepulveda is a 75-year-old man with past medical history remarkable for ESRD on iHD via left arm fistula TTS, HFrEF (EF 30% 5/12/2021), PAD s/p B/L BKA (left 5/28/2021), IDDM 2 (A1c 5.7 5/11/2021), CAD status post multi stent placement with 80% ostial in-stent restenosis 5/18/2021 complicated by distal RCA disease and steal syndrome with ischemic wounds to the fourth finger was brought by ambulance status post ground-level fall.  Patient was recently admitted for pneumonia, treated with Levaquin.  Per wife in the last 2 weeks patient's appeared more confused and short of breath.  Of note, his hemodialysis sessions were curtailed in the last week in the setting of hypotension during dialysis.  Patient underwent right-sided thoracentesis of 1000 mL guided by ultrasound; resulted in transudative fluid     Objective  Pt reports his breathing is improving. O2 demands are 3 liter of O2. PT was evaluated for candidacy for thoracentesis 6/22; no appropriate window was identified.     Interval history     -Had HD on 6/19 with UF of 2.1L   -Doing another HD today    S/p therapeutic thoracentesis 2.4L   Consulted palliative care    Consultants/Specialty:  Nephrology  Review of Systems:      ROS  Review of Systems   Constitutional: Negative for chills, fever and malaise/fatigue.   Respiratory: Positive for cough, sputum production and improved shortness of breath. Negative for hemoptysis and wheezing.    Gastrointestinal: Negative for nausea and vomiting.   Genitourinary: Negative for flank pain, frequency and hematuria.        Anuria   Musculoskeletal: Negative for falls and neck pain.   Neurological: Negative for dizziness and weakness.   Psychiatric/Behavioral: The  patient is not nervous/anxious and does not have insomnia.       Objective Data:   Physical Exam:   Vitals:   Temp:  [36.1 °C (96.9 °F)-36.6 °C (97.8 °F)] 36.3 °C (97.4 °F)  Pulse:  [61-86] 73  Resp:  [15-18] 15  BP: (103-120)/(40-52) 106/40  SpO2:  [91 %-94 %] 94 %   Physical Exam  General: Elderly man laying in bed in no acute distress  HEENT: NC/AT.  PERRLA, EOMI.  External ear normal.  Nares patent, nonedematous nonerythematous.  Moist mucous membranes. Good dentition.  Trachea midline.   Neck: No JVP.  Carotid bruit could not be appreciated.  Nontender, nonnodular thyroid.  No anterior or posterior cervical, occipital, supraclavicular lymphadenopathy  Cardiovascular: PMI<2 cm at fifth costal space at midclavicular line.  No heaves appreciated.  No thrills.  RRR W/O M, R, G.  Pulmonary: Normal work of breathing.  Resonant to percussion.  Ronchi heard in 10 lung fields  Abdomen: Flat, soft, nondistended.  Normoactive bowel sounds.  Nontender to percussion or palpation.  No hepatosplenomegaly noted.  No fluid wave  Musculoskeletal: Bilateral BKA's.  Left BKA wrapped in bandages.  Right stump healthy.  Ischemic changes to fourth finger, fistula patent  Skin: Warm and dry.  No rashes, bruises or lacerations noted  Neuro: Alert and oriented X4.  CN II-XII checked and intact.  5 out of 5 strength throughout.  DTR 2+ throughout.  FTN, HTS intact.  Sensation intact . negative Romberg.  Lymphatic: No edema or lymphadenopathy noted.  Psychiatric: Good mood congruent affect.  Thought form linear, content appropriate       Labs:   Recent Labs     06/24/21  0223 06/25/21  1419 06/26/21  1330   WBC 10.3 8.4 6.7   RBC 2.60* 2.45* 2.25*   HEMOGLOBIN 8.7* 8.0* 7.5*   HEMATOCRIT 26.6* 26.2* 23.7*   .3* 106.9* 105.3*   MCH 33.5* 32.7 33.3*   RDW 59.1* 59.9* 57.8*   PLATELETCT 339 240 222   MPV 8.7* 8.9* 9.1     Recent Labs     06/24/21  0223 06/26/21  0559   SODIUM 132* 136   POTASSIUM 4.8 4.6   CHLORIDE 96 99   CO2 25 27    GLUCOSE 135* 125*   BUN 51* 44*         Imaging:   Thoracentesis CXR on 6/23/2021  FINDINGS:        Fluid was sent to the laboratory.     Fluid character: straw colored     IMPRESSION:     1. Ultrasound guided right sided diagnostic and therapeutic thoracentesis.     2. 2400 mL of fluid withdrawn.                  Problem Representation:  Mr. Sepulveda is a 75-year-old man with past medical history remarkable for ESRD on iHD via left arm fistula TTS, HFrEF (EF 30% 5/12/2021), PAD s/p B/L BKA (left 5/28/2021), IDDM 2 (A1c 5.7 5/11/2021), CAD status post multi stent placement with 80% ostial in-stent restenosis 5/18/2021 complicated by distal RCA disease and steal syndrome with digital DIP ischemia of the fourth finger admitted for hypoxemic respiratory failure 2/2 anasarca s/p hemodialysis, O2 demand worsening, pleural effusion recurred s/p thoracentesis, s/p vein ligation found to have axillary and subclavian occlusion causing steal phenomena on left arm.  Per vascular surgery OK to be discharged, Pending PT/OT recs for safe discharge. Palliative care consulted; GOC reviewed. Pt does not want to return to his previous SNF and would like to explore new options.       * Acute on chronic systolic congestive heart failure (HCC)- (present on admission)  Assessment & Plan  #Acute hypoxic respiratory failure secondary to  #Acute pleural effusion secondary to  #Hypervolemia in the setting of  #Acute on chronic CHF (reduced ejection fraction 30% 5/12/2021 TTE)    *Status post ultrafiltration in ED with symptomatic improvement  *Status post thoracentesis in ED; 1 L removed, fluid transudative.  Reports symptomatic improvement      Improving volume status, no orthopnea.     Plan  -continue carvedilol 3.125mg  -continue atorvastatin 40mg qdaily  -D/C furosemide, no yield  -Cont iHD per nephrology    -1500 mL fluid restriction  -Low-sodium renal diet              Pleural effusion  Assessment & Plan  S/p thoracentesis  6/18.  Repeat CXR 6/21 worsened appearance of pleural effusion    -Repeat thoracentesis at the bedside by IR 6/23/2021        Ischemia of finger  Assessment & Plan  Digital finger ischemia 2/2 AVF steal from brachiocephalic.     Plan  -Status post revision by vascular surgery      Head trauma  Assessment & Plan  #Ground-level fall complicated by  #Suspicion for right femoral neck fracture in the setting of  #Renal osteodystrophy- BMD     *CT was ordered, patient refused/declined to undergo test  -No further interventions at this time to address fracture  -Continue calcitriol 0.25 MCG daily        Paroxysmal A-fib (HCC)- (present on admission)  Assessment & Plan  #ESRD on HD    Stable. At sinus rhtyhm    Plan  -continue carvediolol 3.125mg  -continue apixaban 5 mg BID per pharmacy dosing.     Type 2 diabetes mellitus with kidney complication, with long-term current use of insulin ([facility])- (present on admission)  Assessment & Plan  #IDDM 2  #PAD s/p b/l BKA    Plan.  -Continue glargine 10 units nightly  -SSI  -Lispro insulin 3 units preprandial               CAD (coronary artery disease)- (present on admission)  Assessment & Plan  #CAD  #HLD  #ESRD on HD    -continue Carvedilol 3.125mg qdaily  -Atorvastatin 40mg qdaily     Hyperthyroidism- (present on admission)  Assessment & Plan  TSH 1 month ago 2.14    Plan  -Continue methimazole BID;   -Follow up with endocrinology for tapering and hoping to discontinue if hyperthyroidism resolved.   -follow up LFTs   -Do not order Iodine contrast imaging if not needed.

## 2021-06-26 NOTE — PROGRESS NOTES
"[facility] Nephrology Daily Progress Note     Date of Service  6/26/2021    Chief Complaint  The patient is a 75-year-old gentleman with history of end-stage renal disease on hemodialysis Tuesday, Thursday and Saturday at [facility], presented to [facility] with complaints of dyspnea, tachypnea and hypoxia requiring supplemental oxygen.  He normally resides at a skilled nursing facility.  The patient's last dialysis was on Thursday.  He had a full treatment and it was uneventful, but   prior to that the last two treatments were cut short secondary to hypotension.  Chest x-ray in the emergency room here shows a large right pleural effusion.  The patient states that he has been coughing with productive sputum over the past several days, getting progressively worse.  He has no fevers or chills.  Nephrology was asked to see him for his ESRD and dialysis management and ultrafiltration for volume overload.    Interval Problem Update  6/19 - S/p PUF yesterday with net UF of 3 L. He had thoracentesis yesterday as well with 1000 mL drained. He feels better. HD today.  6/20 - HD yest, UF 2.1L. C/O SOB, congestion. Does not want HD today. Is agreeable to trial of IV lasix and possibly dialysis tomorrow.    6/21 - Sitting up at Bedside eating breakfast. Improved SOB. C/o cough with sputum production. No UOP recorded last 24 hrs.  Would like to go to home vs. Rehab.   6/22: 2.5 L Net UF. 3L NC. C/o R upper CP on inspiration. Thoracentesis today at BS. Primary RN @ BS. C/o cont. Numbness/tingling in L Hand.   6/23: 1430 mL Net UF. Sitting up at side of bed on phone call with brother.  Decreased SOB. C/o congestion. 3.5 L NC.   6/24: Pt in bed, says he is \"sore all over\", yesterday he underwent LUE AVF ligation, RUE venogram and L IJ TDC placement with Dr. Nguyen, also had R sided thoracentesis yesterday with 2.4L removed, labs reviewed, VSS on 3L NC  6/25: Pt resting in bed, comfortable, iHD yesterday " with 0 UF, SNF planning underway, pt met with PC yesterday and is not currently interested in Hospice, VSS on 3L NC  6/26: Pt seen on HD, comfortable, VSS on 3L NC, labs reviewed    Review of Systems  ROS   10-point ROS performed and is as HPI/daily summary.    Physical Exam  Temp:  [36.1 °C (96.9 °F)-36.6 °C (97.8 °F)] 36.3 °C (97.4 °F)  Pulse:  [61-86] 73  Resp:  [15-18] 15  BP: (103-120)/(40-52) 106/40  SpO2:  [91 %-94 %] 94 %    Physical Exam  GENERAL:  He is in no acute hemodynamic distress.    HEENT:  Atraumatic, normocephalic.  Pupils are equal and round, reactive to   light.  NECK:  Moderate jugular venous distention noted.  Supple.  CHEST: Diminished t/o.  CARDIAC:  S1, S2 heard. L AVF s/p ligation, L IJ TDC CDI  PULMONARY: No rhonchi, rales or wheezes noted. Diminished bases.  ABDOMEN:  Soft, nontender, nondistended.  Bowel sounds are present.  EXTREMITIES:  L hand with necrosis of 2nd,3rd and 4th fingers with ulcerations. He has bilateral BKAs, no edema. Some mild erythema to L stump.  NEUROLOGIC:  Cranial nerves are intact.  SKIN: Generalized ecchymosis, wounds to L fingers as described above  PSYCHIATRIC:  No anxiety. Judgment is intact.    Fluids    Intake/Output Summary (Last 24 hours) at 6/26/2021 1232  Last data filed at 6/26/2021 0900  Gross per 24 hour   Intake 120 ml   Output 0 ml   Net 120 ml       Laboratory  Recent Labs     06/24/21 0223 06/25/21  1419   WBC 10.3 8.4   RBC 2.60* 2.45*   HEMOGLOBIN 8.7* 8.0*   HEMATOCRIT 26.6* 26.2*   .3* 106.9*   MCH 33.5* 32.7   MCHC 32.7* 30.5*   RDW 59.1* 59.9*   PLATELETCT 339 240   MPV 8.7* 8.9*     Recent Labs     06/24/21 0223 06/26/21  0559   SODIUM 132* 136   POTASSIUM 4.8 4.6   CHLORIDE 96 99   CO2 25 27   GLUCOSE 135* 125*   BUN 51* 44*   CREATININE 6.03* 6.25*   CALCIUM 9.0 8.9         No results for input(s): NTPROBNP in the last 72 hours.             IMAGING:   DX-CHEST-2 VIEWS  Order: [id number]  Status:  Final result   Visible to  patient:  Yes (MyChart) Next appt:  06/25/2021 at 02:00 PM in Cardiology (TABATHA Laws)   0 Result Notes  Details    Reading Physician Reading Date Result Priority   Cesar Parada M.D.  [phone] 6/21/2021 Routine      Narrative & Impression     6/21/2021 10:57 AM     HISTORY/REASON FOR EXAM:  Shortness of Breath.        TECHNIQUE/EXAM DESCRIPTION AND NUMBER OF VIEWS:  Two views of the chest.     COMPARISON:  6/18/2021     FINDINGS:     Cardiomediastinal silhouette is stable.     Very large right pleural effusion which has increased in size from prior study. There is associated compressive atelectasis of the right lung. Correlate clinically for infection. No pneumothorax.     No acute osseous abnormality.     IMPRESSION:     Large right pleural effusion which has increased in size from prior study.        Available labs, imaging and clinical documentation reviewed.     ASSESSMENT:  # End-stage renal disease, on hemodialysis Tuesday, Thursday and Saturday at   [facility]   - L AVF, s/p ligation 6/23 with Dr. Nguyen   - Now has L IJ TDC placed 6/23  # Chronic hypotension, stable    - On midodrine 10 mg TID   # Peripheral vascular disease, severe   - s/p bilateral LE amputations  # Volume overload/shortness of breath/pleural effusion on the right, improved    -s/p R thoracentesis with 2.4L removed 6/23   # Cardiomyopathy with an ejection fraction of 15-20%  # History of 80% ostial LAD in-stent restenosis  # History of steal syndrome, status post DRIL procedure   - s/p ligation LUE AVF 6/23   # Atrial fibrillation, rate controlled    - On Eliquis and carvedilol   # Diabetes mellitus  # Hyperthyroidism, on Tapazole   # Anemia in CKD, below target    - Goal Hgb 10-11   - ARMANDO with HD  # CAD   - On statin/plavix   # MBD-CKD   - Managed at OP unit   - On calcitriol and sevelamer   - Recent phos 4.1     SUGGESTIONS:   -Continue maintenance HD qTTS/PRN, treatment due today (SAT)  -UF as able  -Stopped ACEi  6/24  -Continue midodrine 10 mg TID   -No dietary protein restrictions  -Renal/low sodium/diabetic diet  -ARMANDO with HD, goal Hgb 10-11   -Transfuse PRN Hgb <7   -Avoid nephrotoxins  -Renal dose medications for ESRD   -Max dose gabapentin in ESRD is 300 mg/day  -Continue calcitriol and sevelamer with meals   -Ongoing GOC discussions; pt is at high risk for further morbidity/mortality  -Pt declines Hospice at this time  -Needs OP FU with Vascular in 1-2 weeks. Consideration for thigh graft placement as OP.  -Follow labs  -OK to transition to OP HD once medically cleared. SNF planning underway.    Thank you,

## 2021-06-26 NOTE — CARE PLAN
Problem: Pain - Standard  Goal: Alleviation of pain or a reduction in pain to the patient’s comfort goal  Note: Prn pain medication provided as needed      Problem: Respiratory  Goal: Patient will achieve/maintain optimum respiratory ventilation and gas exchange  Note: Will try and wean oxygen down    The patient is Watcher - Medium risk of patient condition declining or worsening    Shift Goals  Clinical Goals: monitor hand, snf placement  Patient Goals: pain management   Family Goals: NA

## 2021-06-26 NOTE — PROGRESS NOTES
[facility] Services Progress Note     Hemodialysis treatment ordered today per Dr. REGAN Agee x 3 hours.   Treatment initiated at 1212 ended at 1512.      Patient tolerated tx; see paper flow sheet for details.      Net UF 1000 mL.      Post tx, CVC flushed with saline then locked with heparin 1000 units/mL per designated amount in each wing then clamped and capped. Aspirate heparin prior to next CVC use.     Report given to Primary RN.

## 2021-06-26 NOTE — CARE PLAN
Problem: Knowledge Deficit - Standard  Goal: Patient and family/care givers will demonstrate understanding of plan of care, disease process/condition, diagnostic tests and medications  Outcome: Progressing     Problem: Skin Integrity  Goal: Skin integrity is maintained or improved  Outcome: Progressing   The patient is Watcher - Medium risk of patient condition declining or worsening    Shift Goals  Clinical Goals: monitor hand, snf placement  Patient Goals: pain management   Family Goals: NA    Progress made toward(s) clinical / shift goals:  free of falls, manage BG    Patient is not progressing towards the following goals:

## 2021-06-27 NOTE — PROGRESS NOTES
Received Bedside report. Assumed care at 1900. This pt is AOx4, wheelchair vound, 6/10 pain. Patient and RN discussed plan of care: questions answered. Labs noted, assessment complete, patient tolerating renal (2 gram sodium), consistent CHO (Diabetic) diet.  Pt is on 2 L of O2 via nasal cannula. Call light in place, fall precautions in place, patient educated on importance of calling for assistance. No additional needs at this time. VSS

## 2021-06-27 NOTE — PROGRESS NOTES
"[facility] Nephrology Daily Progress Note     Date of Service  6/27/2021    Chief Complaint  Follow up ESRD    HPI  The patient is a 75-year-old gentleman with history of end-stage renal disease on hemodialysis Tuesday, Thursday and Saturday at [facility], presented to [facility] with complaints of dyspnea, tachypnea and hypoxia requiring supplemental oxygen.  He normally resides at a skilled nursing facility.  The patient's last dialysis was on Thursday.  He had a full treatment and it was uneventful, but prior to that the last two treatments were cut short secondary to hypotension.  Chest x-ray in the emergency room here shows a large right pleural effusion.  The patient states that he has been coughing with productive sputum over the past several days, getting progressively worse.  He has no fevers or chills.  Nephrology was asked to see him for his ESRD and dialysis management and ultrafiltration for volume overload.    DAILY NEPHROLOGY SUMMARY  6/19 - S/p PUF yesterday with net UF of 3 L. He had thoracentesis yesterday as well with 1000 mL drained. He feels better. HD today.  6/20 - HD yest, UF 2.1L. C/O SOB, congestion. Does not want HD today. Is agreeable to trial of IV lasix and possibly dialysis tomorrow.    6/21 - Sitting up at Bedside eating breakfast. Improved SOB. C/o cough with sputum production. No UOP recorded last 24 hrs.  Would like to go to home vs. Rehab.   6/22: 2.5 L Net UF. 3L NC. C/o R upper CP on inspiration. Thoracentesis today at BS. Primary RN @ [facility]. C/o cont. Numbness/tingling in L Hand.   6/23: 1430 mL Net UF. Sitting up at side of bed on phone call with brother.  Decreased SOB. C/o congestion. 3.5 L NC.   6/24: Pt in bed, says he is \"sore all over\", yesterday he underwent LUE AVF ligation, RUE venogram and L IJ TDC placement with Dr. Nguyen, also had R sided thoracentesis yesterday with 2.4L removed, labs reviewed, VSS on 3L NC  6/25: Pt resting in bed, " comfortable, iHD yesterday with 0 UF, SNF planning underway, pt met with PC yesterday and is not currently interested in Hospice, VSS on 3L NC  6/26: Pt seen on HD, comfortable, VSS on 3L NC, labs reviewed  6/27: No events, feels ok, still with pain in left hand, denies any SOB or CP    Review of Systems  10-point ROS performed and is as per HPI/daily summary or otherwise negative.    Physical Exam  Temp:  [36.2 °C (97.2 °F)-37.1 °C (98.8 °F)] 36.8 °C (98.2 °F)  Pulse:  [] 82  Resp:  [16-18] 18  BP: ()/(31-57) 114/42  SpO2:  [90 %-99 %] 93 %    Physical Exam  GENERAL:  He is in no acute hemodynamic distress.    HEENT:  Atraumatic, normocephalic.  Pupils are equal and round, reactive to   light.  NECK:  No masses, no thyromegaly  CARDIAC:  S1, S2 heard. L AVF s/p ligation, L IJ TDC CDI  PULMONARY: No rhonchi, rales or wheezes noted. Diminished bases.  ABDOMEN:  Soft, nontender, nondistended.  Bowel sounds are present.  EXTREMITIES:  L hand with necrosis of 2nd,3rd and 4th fingers with ulcerations. He has bilateral BKAs, no edema. Some mild erythema to L stump.  NEUROLOGIC:  Cranial nerves are intact.  SKIN: Generalized ecchymosis, wounds to L fingers as described above, no rashes  PSYCHIATRIC:  No anxiety. Judgment is intact.    Fluids    Intake/Output Summary (Last 24 hours) at 6/27/2021 1428  Last data filed at 6/26/2021 1512  Gross per 24 hour   Intake 500 ml   Output 1500 ml   Net -1000 ml       Laboratory  Recent Labs     06/25/21  1419 06/26/21  1330 06/27/21  0247   WBC 8.4 6.7 6.9   RBC 2.45* 2.25* 2.29*   HEMOGLOBIN 8.0* 7.5* 7.5*   HEMATOCRIT 26.2* 23.7* 24.4*   .9* 105.3* 106.6*   MCH 32.7 33.3* 32.8   MCHC 30.5* 31.6* 30.7*   RDW 59.9* 57.8* 59.0*   PLATELETCT 240 222 233   MPV 8.9* 9.1 9.3     Recent Labs     06/26/21  0559   SODIUM 136   POTASSIUM 4.6   CHLORIDE 99   CO2 27   GLUCOSE 125*   BUN 44*   CREATININE 6.25*   CALCIUM 8.9         No results for input(s): NTPROBNP in the last  72 hours.             IMAGING:   DX-CHEST-2 VIEWS  Order: [id number]  Status:  Final result   Visible to patient:  Yes (MyChart) Next appt:  06/25/2021 at 02:00 PM in Cardiology (TABATHA Laws)   0 Result Notes  Details    Reading Physician Reading Date Result Priority   Cesar Parada M.D.  [phone] 6/21/2021 Routine      Narrative & Impression     6/21/2021 10:57 AM     HISTORY/REASON FOR EXAM:  Shortness of Breath.        TECHNIQUE/EXAM DESCRIPTION AND NUMBER OF VIEWS:  Two views of the chest.     COMPARISON:  6/18/2021     FINDINGS:     Cardiomediastinal silhouette is stable.     Very large right pleural effusion which has increased in size from prior study. There is associated compressive atelectasis of the right lung. Correlate clinically for infection. No pneumothorax.     No acute osseous abnormality.     IMPRESSION:     Large right pleural effusion which has increased in size from prior study.        Available labs, imaging and clinical documentation reviewed.     ASSESSMENT:  # End-stage renal disease, on hemodialysis Tuesday, Thursday and Saturday at   [facility]   - L AVF, s/p ligation 6/23 with Dr. Nguyen   - Now has L IJ TDC placed 6/23  # Chronic hypotension, stable    - On midodrine 10 mg TID   # Peripheral vascular disease, severe   - s/p bilateral LE amputations  # Volume overload/shortness of breath/pleural effusion on the right, improved    -s/p R thoracentesis with 2.4L removed 6/23   # Cardiomyopathy with an ejection fraction of 15-20%  # History of 80% ostial LAD in-stent restenosis  # History of steal syndrome, status post DRIL procedure   - s/p ligation LUE AVF 6/23   # Atrial fibrillation, rate controlled    - On Eliquis and carvedilol   # Diabetes mellitus--management per primary svc  # Hyperthyroidism, on Tapazole   # Anemia in CKD, below target    - Goal Hgb 10-11   - ARMANDO with HD  # CAD   - On statin/plavix   # MBD-CKD   - Managed at OP unit   - On calcitriol and  sevelamer   - Recent phos 4.1     SUGGESTIONS:   -Continue maintenance HD qTTS/PRN, no HD today  -UF as able  -Stopped ACEi 6/24  -Continue midodrine 10 mg TID   -No dietary protein restrictions  -Renal/low sodium/diabetic diet  -ARMANDO with HD, goal Hgb 10-11   -Transfuse PRN Hgb <7   -Avoid nephrotoxins  -Renal dose medications for ESRD   -Max dose gabapentin in ESRD is 300 mg/day  -Continue calcitriol and sevelamer with meals   -Ongoing GOC discussions; pt is at high risk for further morbidity/mortality  -Pt declines Hospice at this time  -Needs OP FU with Vascular in 1-2 weeks. Consideration for thigh graft placement as OP.  -Follow labs  -OK to transition to OP HD once medically cleared. SNF planning underway.    Thank you,

## 2021-06-27 NOTE — CARE PLAN
Problem: Fall Risk  Goal: Patient will remain free from falls  Outcome: Progressing  Note: Patient has remained free from falls. Fall precautions in place.      Problem: Pain - Standard  Goal: Alleviation of pain or a reduction in pain to the patient’s comfort goal  Outcome: Progressing  Flowsheets  Taken 6/27/2021 0400  Non Verbal Scale:   Calm   Sleeping   Unlabored Breathing  Taken 6/26/2021 2159  Pain Rating Scale (NPRS): 7  Note: Patient was given pain medication at bedtime and slept comfortably throughout the night after giving pain med.   The patient is Watcher - Medium risk of patient condition declining or worsening    Shift Goals  Clinical Goals: Reduction in pain   Patient Goals: Pain management, rest  Family Goals: N/A    Progress made toward(s) clinical / shift goals:   Patient was given a pain medication at bedtime and slept comfortably throughout the night.    Patient is not progressing towards the following goals: N/A

## 2021-06-27 NOTE — CARE PLAN
Problem: Knowledge Deficit - Standard  Goal: Patient and family/care givers will demonstrate understanding of plan of care, disease process/condition, diagnostic tests and medications  Outcome: Progressing     Problem: Skin Integrity  Goal: Skin integrity is maintained or improved  Outcome: Progressing     Problem: Fall Risk  Goal: Patient will remain free from falls  Outcome: Progressing     Problem: Pain - Standard  Goal: Alleviation of pain or a reduction in pain to the patient’s comfort goal  Outcome: Progressing     Problem: Respiratory  Goal: Patient will achieve/maintain optimum respiratory ventilation and gas exchange  Outcome: Progressing     Problem: Fluid Volume  Goal: Fluid volume balance will be maintained  Outcome: Progressing     Problem: Urinary Elimination  Goal: Establish and maintain regular urinary output  Outcome: Progressing   The patient is Stable - Low risk of patient condition declining or worsening    Shift Goals  Clinical Goals: Reduction in pain   Patient Goals: Pain management, rest  Family Goals: N/A    Progress made toward(s) clinical / shift goals:  progressing    Patient is not progressing towards the following goals:

## 2021-06-27 NOTE — PROGRESS NOTES
Received bedside report from JAN silver, pt care assumed, VS stable, pt assessment complete. Pt AAOx4, c/o chronic pain at this time. No signs of acute distress noted at this time. POC discussed with pt and verbalizes no questions. Pt denies any additional needs at this time. Bed in lowest position, bed alarm on. Pt educated on fall risk and verbalized understanding, call light within reach, hourly rounding initiated. Not monitored.

## 2021-06-28 NOTE — DISCHARGE PLANNING
Anticipated Discharge Disposition: [facility]    Action: Transfer packet completed and given to bedside RN Josiah along with a paper prescription for the patient's oxycodone.  Patient signed 2nd IMM at 1220, 2nd IMM faxed to JEREMY Garcia.    Barriers to Discharge: None     Plan: Case coordination available to discuss any further discharge barriers.

## 2021-06-28 NOTE — PROGRESS NOTES
Patient left with [facility] transporter with 2L nasal canula oxygen support. LUCW HD cath intact. Informed [facility] facility that patient will not be continuing on furosemide per [facility].

## 2021-06-28 NOTE — PROGRESS NOTES
Assumed care of PT A&O 4. Pt resting in bed with no signs of labored breathing. On 2.5L. Pt is medical. Call light within reach, bed in lowest position, upper bed rails up. Pt was updated on plan of care for the day. Will continue to monitor.

## 2021-06-28 NOTE — DISCHARGE PLANNING
Anticipated Discharge Disposition: [facility]    Action: JEREMY Garcia called Alanna at [facility] and asked for transport to be rescheduled, Alanna rescheduled transport to 1530.  RN LAZARO updated Dr. Ac via Vo[employer].      Barriers to Discharge: DC summary     Plan: Case coordination to continue to follow up with medical team to discuss discharge barriers.    Dr. Lim please advise,    Spoke with Glo, patient's significant other. She reports patient is having teeth extracted on 3/15 by Dr. Quintero. She wants to know when patient should schedule follow up with Dr. Lim.

## 2021-06-28 NOTE — DISCHARGE SUMMARY
Discharge Summary    Date of Admission: 6/18/2021  Date of Discharge: 6/28/2021  Discharging Attending: KEREN Canada M.D.   Discharging Senior Resident: Dr. Mendoza  Discharging Intern: Dr. Ac    CHIEF COMPLAINT ON ADMISSION  Chief Complaint   Patient presents with   • Fall   • Abrasion     hands and elbow       Reason for Admission  Acute hypoxemic respiratory failure secondary to volume overload.  Decompensated biventricular Heart Failure  Left digital finger ischemia secondary to steal syndrome secondary to occluded right axially and subclavian veins status post venous ligation.    Admission Date  6/18/2021     CODE STATUS  DNAR/DNI    HPI & HOSPITAL COURSE  Mr. Sepulveda is a 75-year-old man with recent hx of treated pneumonia with past medical history remarkable for End Stage renal Disease on iHD non-oliguric via left arm fistula TTS, HFrEF 15 to 20% (EF 30% 5/12/2021), PAD s/p B/L BKA (left 5/28/2021), IDDM 2 (A1c 5.7 5/11/2021), CAD status post multi stent placement with 80% ostial in-stent restenosis 5/18/2021 complicated by distal RCA disease and steal syndrome with ischemic wounds to the fourth finger was brought by ambulance status post ground-level fall.       Per wife, patient had been treated with flouroquinolone and had been having mentation/confusion status accompanied by dyspnea. Of note patient had been having intra-dialytic hypotension hence was held in the last week. Patient then was admitted to the emergency department.     At the emergency department, vitals were /77; HR 64; RR 14, SpO2 93%, Hgb 9.9; RDW 57.7; NT proBNP >44164, EKG showed atrial fibrillation, V-Rate . RBBB and LAFB. ST and T wave did not show ischemic changes. Chest Xray showed cardiomegaly with left lung base infiltrate or atelectasis with small left pleural effusion, with deepa-hilar interstitial prominence and bronchial wall cuffing. CT head scan which was negative for bleed.  Cardiology was consulted  thinks less likely heart failure since patient has normal ejection fraction. Vascular susrgery was consulted but initially di dnot have further recommendation of his left fingers. IR was done and 1000cc was taken out, patient improved. Patient then was transferred to the medical floor for further management.    At the medical floor, patient had hemodialysis and patient had UF 3000ml. Afterwards, patient was put on furosemide 80 mg IV BID. Patient's oxygen level improved but was still on 2L of oxygen. Patient's volume status improved and a repeated thoracentesis was done. Vascular surgery was re-consulted as the team was concerned for function of the left hand. Ligation of left upper extremities. A tunnel cath was placed on the patient's left chest.  Palliative care was consulted and luis enrique did not elect hospice with the intent of discussion goals of care that his situation will worsens especially with his ESRD.       At the day of discharge, it was communicated patient will be needing to meet with vascular surgery in 2 weeks for fistula placement on his left thigh.  Patient was recommended to be put on post-acute placement for additional therapy services prior to discharging home.  Patient will continue to have hemodialysis outpatient.       The following issues were addressed in his hospital stay:    Acute on chronic systolic congestive heart failure (HCC)- (present on admission)  Assessment & Plan  #Acute hypoxic respiratory failure secondary to  #Acute pleural effusion secondary to  #Hypervolemia in the setting of  #Acute on chronic CHF (reduced ejection fraction 30% 5/12/2021 TTE)     *Status post ultrafiltration in ED with symptomatic improvement  *Status post thoracentesis in ED; 1 L removed, fluid transudative.  Reports symptomatic improvement        Stable volume status, no orthopnea.      Plan  -discharging with carvedilol 3.125mg  -discharging with atorvastatin 40mg qdaily       -patient was not put on  ACEi and ARBs for kidney failure and risk for hyperkalemia   -D/C furosemide, no yield  -Cont iHD per nephrology outpatient     -continue 1500 mL fluid restriction  -continue Low-sodium renal diet               Pleural effusion  Assessment & Plan  S/p thoracentesis 6/18.  Repeat CXR 6/21 worsened appearance of pleural effusion     -Repeat thoracentesis at the bedside by IR 6/23/2021    Stable, at this point          Ischemia of finger  Assessment & Plan  Digital finger ischemia 2/2 AVF steal from brachiocephalic.      Plan  -Status post revision by vascular surgery  -appreciate physical and occupational therapy         Head trauma  Assessment & Plan  #Ground-level fall complicated by  #Suspicion for right femoral neck fracture in the setting of  #Renal osteodystrophy- BMD      *CT was ordered, patient refused/declined to undergo test  -No further interventions at this time to address fracture  -discharge with calcitriol 0.25 MCG daily         Paroxysmal A-fib (HCC)- (present on admission)  Assessment & Plan  #ESRD on HD     Stable. At sinus rhtyhm     Plan  -discharge with carvediolol 3.125mg  -discharge with apixaban 5 mg BID per pharmacy dosing.      Type 2 diabetes mellitus with kidney complication, with long-term current use of insulin (HCC)- (present on admission)  Assessment & Plan  #IDDM 2  #PAD s/p b/l BKA     Plan.  -Continue glargine 10 units nightly  -SSI  -Lispro insulin 3 units preprandial            CAD (coronary artery disease)- (present on admission)  Assessment & Plan  #CAD  #HLD  #ESRD on HD     -discharge with  Carvedilol 3.125mg qdaily  -discharge with atorvastatin 40mg qdaily      Hyperthyroidism- (present on admission)  Assessment & Plan  TSH 1 month ago 2.14     Plan  -discharge with methimazole BID;   -Follow up with endocrinology for tapering and hoping to discontinue if hyperthyroidism resolved.   -follow up LFTs   -Do not order Iodine contrast imaging if not needed.        Therefore, he is  discharged in fair and stable condition to skilled nursing facility.    The patient met 2-midnight criteria for an inpatient stay at the time of discharge.    PHYSICAL EXAM ON DISCHARGE  Temp:  [36.2 °C (97.2 °F)-36.9 °C (98.4 °F)] 36.5 °C (97.7 °F)  Pulse:  [53-95] 93  Resp:  [16-18] 18  BP: ()/(36-75) 118/46  SpO2:  [92 %-98 %] 98 %    Physical Exam  General: Elderly man laying in bed in no acute distress  HEENT: NC/AT.  PERRLA, EOMI.  External ear normal.  Nares patent, nonedematous nonerythematous.  Moist mucous membranes. Good dentition.  Trachea midline.   Neck: No JVP.  Carotid bruit could not be appreciated.  Nontender, nonnodular thyroid.  No anterior or posterior cervical, occipital, supraclavicular lymphadenopathy  Cardiovascular: PMI<2 cm at fifth costal space at midclavicular line.  No heaves appreciated.  No thrills.  RRR W/O M, R, G.  Pulmonary: Normal work of breathing.  Resonant to percussion.  Ronchi heard in 10 lung fields  Abdomen: Flat, soft, nondistended.  Normoactive bowel sounds.  Nontender to percussion or palpation.  No hepatosplenomegaly noted.  No fluid wave  Musculoskeletal: Bilateral BKA's.  Left BKA wrapped in bandages.  Right stump healthy.  Ischemic changes to fourth finger, fistula patent  Skin: Warm and dry.  No rashes, bruises or lacerations noted  Neuro: Alert and oriented X4.  CN II-XII checked and intact.  5 out of 5 strength throughout.  DTR 2+ throughout.  FTN, HTS intact.  Sensation intact . negative Romberg.  Lymphatic: No edema or lymphadenopathy noted.  Psychiatric: Good mood congruent affect.  Thought form linear, content       Discharge Date  6/28/2021    FOLLOW UP ITEMS POST DISCHARGE  Follow up in terms of fistula access in 2 weeks with vascular surgery, patient on port cath  Follow up with outpatient nephrology  Follow up with outpatient palliative care in terms of evolving goals of care   Follow up oxygen titration.       DISCHARGE DIAGNOSES  Principal  Problem:    Acute on chronic systolic congestive heart failure (HCC) POA: Yes  Active Problems:    Paroxysmal A-fib (HCC) POA: Yes    Head trauma POA: Unknown    CAD (coronary artery disease) POA: Yes      Overview: Stents to proximal and mid LAD in [facility].    Type 2 diabetes mellitus with kidney complication, with long-term current use of insulin (HCC) POA: Yes    Hyperthyroidism POA: Yes    Ischemia of finger POA: Unknown    Pleural effusion POA: Unknown  Resolved Problems:    Hypotension POA: Yes      FOLLOW UP  Future Appointments   Date Time Provider Department Center   7/2/2021 10:45 AM Tito Amin M.D. [facility] None     No follow-up provider specified.    MEDICATIONS ON DISCHARGE     Medication List      ASK your doctor about these medications      Instructions   apixaban 5mg Tabs  Commonly known as: ELIQUIS   Take 1 tablet by mouth 2 times a day. Indications: Thromboembolism secondary to Atrial Fibrillation  Dose: 5 mg     atorvastatin 40 MG Tabs  Commonly known as: LIPITOR   Take 1 tablet by mouth at bedtime.  Dose: 40 mg     budesonide 0.5 MG/2ML Susp  Commonly known as: PULMICORT   Take 500 mcg by nebulization 2 times a day.  Dose: 500 mcg     calcitRIOL 0.25 MCG Caps  Commonly known as: ROCALTROL   Take 1 capsule by mouth every day.  Dose: 0.25 mcg     carvedilol 3.125 MG Tabs  Commonly known as: COREG   Take 3.125 mg by mouth 2 times a day.  Dose: 3.125 mg     clopidogrel 75 MG Tabs  Commonly known as: PLAVIX   Take 1 tablet by mouth every day.  Dose: 75 mg     furosemide 20 MG Tabs  Commonly known as: LASIX   Take 20 mg by mouth every day.  Dose: 20 mg     gabapentin 300 MG Caps  Commonly known as: NEURONTIN   Take 1 capsule by mouth every day.  Dose: 300 mg     * guaiFENesin 100 MG/5ML Soln  Commonly known as: ROBITUSSIN   Take 10 mL by mouth every 6 hours as needed.  Dose: 10 mL     * Guaifenesin 400 MG Tabs   Take 400 mg by mouth in the morning, at noon, and at bedtime.  Dose: 400 mg      ipratropium-albuterol 0.5-2.5 (3) MG/3ML nebulizer solution  Commonly known as: DUONEB   Take 3 mL by nebulization 4 times a day.  Dose: 3 mL     Lantus SoloStar 100 UNIT/ML Sopn injection  Generic drug: insulin glargine  Ask about: Should I take this medication?   Inject 8 Units under the skin at bedtime for 30 days.  Dose: 8 Units     Levaquin 250 MG Tabs  Generic drug: levoFLOXacin   Take 250 mg by mouth every day. Taken for 6 days.  Dose: 250 mg     methimazole 5 MG Tabs  Commonly known as: TAPAZOLE   Take 1 tablet by mouth 2 times a day.  Dose: 5 mg     * methylPREDNISolone 4 MG Tabs  Commonly known as: MEDROL   Take 4-20 mg by mouth every day. This medication was D/C'ed 6/15/21.    Day 1:  2 tablets before breakfast, 1 tablet after lunch and after supper, 2 tablets at bedtime  Day 2:  1 tablet before breakfast, 1 tablet after lunch and after supper, 2 tablets at bedtime  Day 3:  1 tablet before breakfast and 1 tablet after lunch, after supper and at bedtime  Day 4:  1 tablet before breakfast, after lunch, and at bedtime  Day 5:  1 tablet before breakfast and at bedtime  Day 6:  1 tablet before breakfast  Dose: 4-20 mg     * methylPREDNISolone 4 MG Tabs  Commonly known as: MEDROL  Ask about: Should I take this medication?   Take 4 mg by mouth 2 times a day. Discontinued 6/18.  Dose: 4 mg     midodrine 10 MG tablet  Commonly known as: PROAMATINE   Take 1 tablet by mouth 3 times a day with meals.  Dose: 10 mg     omeprazole 20 MG delayed-release capsule  Commonly known as: PRILOSEC   Take 1 capsule by mouth 2 times a day.  Dose: 20 mg     oxyCODONE-acetaminophen 5-325 MG Tabs  Commonly known as: PERCOCET   Take 1 tablet by mouth every four hours as needed.  Dose: 1 tablet     sevelamer carbonate 800 MG Tabs tablet  Commonly known as: RENVELA   Take 2 Tablets by mouth 3 times a day with meals.  Dose: 1,600 mg     tamsulosin 0.4 MG capsule  Commonly known as: FLOMAX   Take 2 Capsules by mouth at bedtime.  Dose:  "0.8 mg     traMADol 50 MG Tabs  Commonly known as: ULTRAM   Take 25 mg by mouth every 6 hours as needed for Mild Pain.  Dose: 25 mg     traZODone 50 MG Tabs  Commonly known as: DESYREL   Take 1 tablet by mouth at bedtime for 30 days.  Dose: 50 mg         * This list has 4 medication(s) that are the same as other medications prescribed for you. Read the directions carefully, and ask your doctor or other care provider to review them with you.                Allergies  Allergies   Allergen Reactions   • Mircera [Methoxy Polyethylene Glycol-Epoetin Beta] Hives   • Other Drug      \"Opiods\" caused  hallucinations       DIET  Orders Placed This Encounter   Procedures   • Diet Order Diet: Renal (2 gram sodium); Second Modifier: (optional): Consistent CHO (Diabetic); Fluid modifications: (optional): 1500 ml Fluid Restriction     Standing Status:   Standing     Number of Occurrences:   1     Order Specific Question:   Diet:     Answer:   Renal [8]     Comments:   2 gram sodium     Order Specific Question:   Second Modifier: (optional)     Answer:   Consistent CHO (Diabetic) [4]     Order Specific Question:   Fluid modifications: (optional)     Answer:   1500 ml Fluid Restriction [9]       ACTIVITY  As tolerated and directed by skilled nursing.  Weight bearing as tolerated    CONSULTATIONS  Keyshawn Murphy with Nephrology Service consulted.  Treatment options were discussed and plan of care agreed upon. and Dr. Nguyen for vascular surger.     PROCEDURES  Fistula ligation, port cath installment.   Hemodialysis     42 minutes was spent discharging this patient.    "

## 2021-06-28 NOTE — DISCHARGE PLANNING
Anticipated Discharge Disposition: SNF    Action: Patient expressed concerns about going to [facility].  JAN WILLIS called Jose at [facility] to ask about the patient transferring from [facility] to [facility] when they have a bed.  Jose said that this is doable, [facility] just needs to send their own referral when they have the patient.  RN CM updated the patient regarding the conversation with Jose, patient agreed with the plan to go to [facility] with an eventual transfer to [facility].      Barriers to Discharge: [facility] bed     Plan: Case coordination to continue to follow up with medical team to discuss discharge barriers.

## 2021-06-28 NOTE — DISCHARGE INSTRUCTIONS
Discharge Instructions    Discharged to other by medical transportation with escort. Discharged via wheelchair, hospital escort: Yes.  Special equipment needed: Oxygen    Be sure to schedule a follow-up appointment with your primary care doctor or any specialists as instructed.     Discharge Plan:   Diet Plan: Discussed  Activity Level: Discussed  Confirmed Follow up Appointment: Patient to Call and Schedule Appointment  Confirmed Symptoms Management: Discussed  Medication Reconciliation Updated: Yes    I understand that a diet low in cholesterol, fat, and sodium is recommended for good health. Unless I have been given specific instructions below for another diet, I accept this instruction as my diet prescription.   Other diet: Renal, Diabetic    Special Instructions: None    · Is patient discharged on Warfarin / Coumadin?   No     Depression / Suicide Risk    As [facility] are [facility], it is important to learn how to keep safe from harming yourself.    Recognize the warning signs:  · Abrupt changes in personality, positive or negative- including increase in energy   · Giving away possessions  · Change in eating patterns- significant weight changes-  positive or negative  · Change in sleeping patterns- unable to sleep or sleeping all the time   · Unwillingness or inability to communicate  · Depression  · Unusual sadness, discouragement and loneliness  · Talk of wanting to die  · Neglect of personal appearance   · Rebelliousness- reckless behavior  · Withdrawal from people/activities they love  · Confusion- inability to concentrate     If you or a loved one observes any of these behaviors or has concerns about self-harm, here's what you can do:  · Talk about it- your feelings and reasons for harming yourself  · Remove any means that you might use to hurt yourself (examples: pills, rope, extension cords, firearm)  · Get professional help from the community (Mental Health, Substance Abuse,  psychological counseling)  · Do not be alone:Call your Safe Contact- someone whom you trust who will be there for you.  · Call your local CRISIS HOTLINE 905-2227 or 755-273-3551  · Call your local Children's Mobile Crisis Response Team [place] (501) 320-9893 or www.mgMEDIA  · Call the toll free National Suicide Prevention Hotlines   · National Suicide Prevention Lifeline 671-663-FFWJ (9498)  · National Hope Line Network 800-SUICIDE (288-0132)

## 2021-06-28 NOTE — PROGRESS NOTES
Discussed discharge instructions with patient and family member including medication details and f/ apt, no questions at the moment. Tolerated IV removal. Patient awaiting  from [facility] to [facility]. TM

## 2021-06-28 NOTE — DISCHARGE PLANNING
Agency/Facility Name: [facility]  Outcome: Left vmail regarding bed availability    @[phone]  Agency/Facility Name: [facility]  Outcome: Left vmail regarding bed availability    @[phone]  Agency/Facility Name: [facility]  Spoke To: Alanna  Outcome: Alanna stated that pt can transfer to [facility] at 1430 via [facility].    DORI Chapin notified    @[phone]  Agency/Facility Name: [facility]  Spoke To: Alanna  Outcome: Per DORI Chapin this DPA requested that transport be pushed back to 1530 to allow time for d/c summary to be submitted

## 2021-06-28 NOTE — PROGRESS NOTES
Daily Progress Note:     Date of Service: 6/27/2021  Primary Team: [facility] Team   Attending: Radames Sommers M.D.   Senior Resident: Dr. Mendoza  Intern: Dr. Ac  Contact:  [phone]     Chief Complaint:   GLF, shortness of breath    Subjective  Mr. Sepulveda is a 75-year-old man with past medical history remarkable for ESRD on iHD via left arm fistula TTS, HFrEF (EF 30% 5/12/2021), PAD s/p B/L BKA (left 5/28/2021), IDDM 2 (A1c 5.7 5/11/2021), CAD status post multi stent placement with 80% ostial in-stent restenosis 5/18/2021 complicated by distal RCA disease and steal syndrome with ischemic wounds to the fourth finger was brought by ambulance status post ground-level fall.  Patient was recently admitted for pneumonia, treated with Levaquin.  Per wife in the last 2 weeks patient's appeared more confused and short of breath.  Of note, his hemodialysis sessions were curtailed in the last week in the setting of hypotension during dialysis.       Objective  Pt reports his breathing is improving. O2 demands are 3 liter of O2. PT was evaluated for candidacy for thoracentesis 6/22; no appropriate window was identified.     Interval history     -Had HD on 6/19 with UF of 2.1L   -Doing another HD today    -S/p therapeutic thoracentesis 2.4L   -Consulted palliative care; patient declined hospice at this time    Consultants/Specialty:  Nephrology  Review of Systems:      ROS  Review of Systems   Constitutional: Negative for chills, fever and malaise/fatigue.   Respiratory: Positive for cough, sputum production and improved shortness of breath. Negative for hemoptysis and wheezing.    Gastrointestinal: Negative for nausea and vomiting.   Genitourinary: Negative for flank pain, frequency and hematuria.        Anuria   Musculoskeletal: Negative for falls and neck pain.   Neurological: Negative for dizziness and weakness.   Psychiatric/Behavioral: The patient is not nervous/anxious and does not have insomnia.        Objective Data:   Physical Exam:   Vitals:   Temp:  [36.2 °C (97.2 °F)-37.1 °C (98.8 °F)] 36.2 °C (97.2 °F)  Pulse:  [80-94] 87  Resp:  [16-18] 17  BP: ()/(31-57) 109/39  SpO2:  [90 %-97 %] 96 %   Physical Exam  General: Elderly man laying in bed in no acute distress  HEENT: NC/AT.  PERRLA, EOMI.  External ear normal.  Nares patent, nonedematous nonerythematous.  Moist mucous membranes. Good dentition.  Trachea midline.   Neck: No JVP.  Carotid bruit could not be appreciated.  Nontender, nonnodular thyroid.  No anterior or posterior cervical, occipital, supraclavicular lymphadenopathy  Cardiovascular: PMI<2 cm at fifth costal space at midclavicular line.  No heaves appreciated.  No thrills.  RRR W/O M, R, G.  Pulmonary: Normal work of breathing.  Resonant to percussion.  Ronchi heard in 10 lung fields  Abdomen: Flat, soft, nondistended.  Normoactive bowel sounds.  Nontender to percussion or palpation.  No hepatosplenomegaly noted.  No fluid wave  Musculoskeletal: Bilateral BKA's.  Left BKA wrapped in bandages.  Right stump healthy.  Ischemic changes to fourth finger, fistula patent  Skin: Warm and dry.  No rashes, bruises or lacerations noted  Neuro: Alert and oriented X4.  CN II-XII checked and intact.  5 out of 5 strength throughout.  DTR 2+ throughout.  FTN, HTS intact.  Sensation intact . negative Romberg.  Lymphatic: No edema or lymphadenopathy noted.  Psychiatric: Good mood congruent affect.  Thought form linear, content appropriate       Labs:   Recent Labs     06/25/21  1419 06/26/21  1330 06/27/21  0247   WBC 8.4 6.7 6.9   RBC 2.45* 2.25* 2.29*   HEMOGLOBIN 8.0* 7.5* 7.5*   HEMATOCRIT 26.2* 23.7* 24.4*   .9* 105.3* 106.6*   MCH 32.7 33.3* 32.8   RDW 59.9* 57.8* 59.0*   PLATELETCT 240 222 233   MPV 8.9* 9.1 9.3     Recent Labs     06/26/21  0559 06/27/21  0247   SODIUM 136 139   POTASSIUM 4.6 4.9   CHLORIDE 99 106   CO2 27 24   GLUCOSE 125* 107*   BUN 44* 23*         Imaging:    Thoracentesis CXR on 6/23/2021  FINDINGS:        Fluid was sent to the laboratory.     Fluid character: straw colored     IMPRESSION:     1. Ultrasound guided right sided diagnostic and therapeutic thoracentesis.     2. 2400 mL of fluid withdrawn.                  Problem Representation:  Mr. Sepulveda is a 75-year-old man with past medical history remarkable for ESRD on iHD via left arm fistula TTS, HFrEF (EF 30% 5/12/2021), PAD s/p B/L BKA (left 5/28/2021), IDDM 2 (A1c 5.7 5/11/2021), CAD status post multi stent placement with 80% ostial in-stent restenosis 5/18/2021 complicated by distal RCA disease and steal syndrome with digital DIP ischemia of the fourth finger admitted for hypoxemic respiratory failure 2/2 anasarca s/p hemodialysis, O2 demand worsening, pleural effusion recurred s/p thoracentesis, s/p vein ligation found to have axillary and subclavian occlusion causing steal phenomena on left arm.  Per vascular surgery OK to be discharged, Pending PT/OT recs for safe discharge. Palliative care consulted; GOC reviewed. Pt does not want to return to his previous SNF ([facility]) and would like to explore new options.  Another goals of care discussion was performed by myself 6/27/2021, in which the patient expressed his need to live for at least 12 months in order for his wife to qualify for VA benefits.  (They are newlywed's).  [facility] [place] had accepted the patient and will have a bed available July 7.  Patient considering this option but would still like to pursue other venues.       * Acute on chronic systolic congestive heart failure (HCC)- (present on admission)  Assessment & Plan  #Acute hypoxic respiratory failure secondary to  #Acute pleural effusion secondary to  #Hypervolemia in the setting of  #Acute on chronic CHF (reduced ejection fraction 30% 5/12/2021 TTE)    *Status post ultrafiltration in ED with symptomatic improvement  *Status post thoracentesis in ED; 1 L removed, fluid  transudative.  Reports symptomatic improvement      Improving volume status, no orthopnea.     Plan  -continue carvedilol 3.125mg  -continue atorvastatin 40mg qdaily  -D/C furosemide, no yield  -Cont iHD per nephrology    -1500 mL fluid restriction  -Low-sodium renal diet              Pleural effusion  Assessment & Plan  S/p thoracentesis 6/18.  Repeat CXR 6/21 worsened appearance of pleural effusion    -Repeat thoracentesis at the bedside by IR 6/23/2021        Ischemia of finger  Assessment & Plan  Digital finger ischemia 2/2 AVF steal from brachiocephalic.     Plan  -Status post revision by vascular surgery      Head trauma  Assessment & Plan  #Ground-level fall complicated by  #Suspicion for right femoral neck fracture in the setting of  #Renal osteodystrophy- BMD     *CT was ordered, patient refused/declined to undergo test  -No further interventions at this time to address fracture  -Continue calcitriol 0.25 MCG daily        Paroxysmal A-fib (HCC)- (present on admission)  Assessment & Plan  #ESRD on HD    Stable. At sinus rhtyhm    Plan  -continue carvediolol 3.125mg  -continue apixaban 5 mg BID per pharmacy dosing.     Type 2 diabetes mellitus with kidney complication, with long-term current use of insulin (HCC)- (present on admission)  Assessment & Plan  #IDDM 2  #PAD s/p b/l BKA    Plan.  -Continue glargine 10 units nightly  -SSI  -Lispro insulin 3 units preprandial               CAD (coronary artery disease)- (present on admission)  Assessment & Plan  #CAD  #HLD  #ESRD on HD    -continue Carvedilol 3.125mg qdaily  -Atorvastatin 40mg qdaily     Hyperthyroidism- (present on admission)  Assessment & Plan  TSH 1 month ago 2.14    Plan  -Continue methimazole BID;   -Follow up with endocrinology for tapering and hoping to discontinue if hyperthyroidism resolved.   -follow up LFTs   -Do not order Iodine contrast imaging if not needed.

## 2021-06-28 NOTE — CARE PLAN
The patient is Watcher - Medium risk of patient condition declining or worsening    Shift Goals  Clinical Goals: pain management  Patient Goals: rest, pain management  Family Goals: N/A    Progress made toward(s) clinical / shift goals:    Problem: Knowledge Deficit - Standard  Goal: Patient and family/care givers will demonstrate understanding of plan of care, disease process/condition, diagnostic tests and medications  Outcome: Progressing     Problem: Skin Integrity  Goal: Skin integrity is maintained or improved  Outcome: Progressing  Note: Educated pt regarding the importance of turning. Pt also educated regarding BKA incision and cyanotic fingers. Pt indicated understanding.      Problem: Pain - Standard  Goal: Alleviation of pain or a reduction in pain to the patient’s comfort goal  Outcome: Progressing  Note: PT complained of 9/10 pain. Pt medicated per MAR. Pt now sleeping in bed and resting comfortably.

## 2021-06-28 NOTE — PROGRESS NOTES
"[facility] Nephrology Daily Progress Note     Date of Service  6/28/2021    Chief Complaint  Follow up ESRD    HPI  The patient is a 75-year-old gentleman with history of end-stage renal disease on hemodialysis Tuesday, Thursday and Saturday at [facility], presented to [facility] with complaints of dyspnea, tachypnea and hypoxia requiring supplemental oxygen.  He normally resides at a skilled nursing facility.  The patient's last dialysis was on Thursday.  He had a full treatment and it was uneventful, but prior to that the last two treatments were cut short secondary to hypotension.  Chest x-ray in the emergency room here shows a large right pleural effusion.  The patient states that he has been coughing with productive sputum over the past several days, getting progressively worse.  He has no fevers or chills.  Nephrology was asked to see him for his ESRD and dialysis management and ultrafiltration for volume overload.    DAILY NEPHROLOGY SUMMARY  6/19 - S/p PUF yesterday with net UF of 3 L. He had thoracentesis yesterday as well with 1000 mL drained. He feels better. HD today.  6/20 - HD yest, UF 2.1L. C/O SOB, congestion. Does not want HD today. Is agreeable to trial of IV lasix and possibly dialysis tomorrow.    6/21 - Sitting up at Bedside eating breakfast. Improved SOB. C/o cough with sputum production. No UOP recorded last 24 hrs.  Would like to go to home vs. Rehab.   6/22: 2.5 L Net UF. 3L NC. C/o R upper CP on inspiration. Thoracentesis today at BS. Primary RN @ [facility]. C/o cont. Numbness/tingling in L Hand.   6/23: 1430 mL Net UF. Sitting up at side of bed on phone call with brother.  Decreased SOB. C/o congestion. 3.5 L NC.   6/24: Pt in bed, says he is \"sore all over\", yesterday he underwent LUE AVF ligation, RUE venogram and L IJ TDC placement with Dr. Nguyen, also had R sided thoracentesis yesterday with 2.4L removed, labs reviewed, VSS on 3L NC  6/25: Pt resting in bed, " comfortable, iHD yesterday with 0 UF, SNF planning underway, pt met with PC yesterday and is not currently interested in Hospice, VSS on 3L NC  6/26: Pt seen on HD, comfortable, VSS on 3L NC, labs reviewed  6/27: No events, feels ok, still with pain in left hand, denies any SOB or CP  6/28: NAEO, no complaints, eating breakfast    Review of Systems  10-point ROS performed and is as per HPI/daily summary or otherwise negative.    Physical Exam  Temp:  [36.2 °C (97.2 °F)-36.9 °C (98.4 °F)] 36.5 °C (97.7 °F)  Pulse:  [53-95] 93  Resp:  [16-18] 18  BP: ()/(36-75) 118/46  SpO2:  [92 %-98 %] 98 %    Physical Exam  GENERAL:  He is in no acute hemodynamic distress.    HEENT:  Atraumatic, normocephalic.  Pupils are equal and round, reactive to   light.  NECK:  No masses, no thyromegaly  CARDIAC:  S1, S2 heard. L AVF s/p ligation, L IJ TDC CDI  PULMONARY: No rhonchi, rales or wheezes noted. Diminished bases.  ABDOMEN:  Soft, nontender, nondistended.  Bowel sounds are present.  EXTREMITIES:  L hand with necrosis of 2nd,3rd and 4th fingers with ulcerations. He has bilateral BKAs, no edema. Some mild erythema to L stump.  NEUROLOGIC:  Cranial nerves are intact.  SKIN: Generalized ecchymosis, wounds to L fingers as described above, no rashes  PSYCHIATRIC:  No anxiety. Judgment is intact.    Fluids    Intake/Output Summary (Last 24 hours) at 6/28/2021 1145  Last data filed at 6/28/2021 0400  Gross per 24 hour   Intake 360 ml   Output --   Net 360 ml       Laboratory  Recent Labs     06/26/21  1330 06/27/21  0247 06/28/21  0355   WBC 6.7 6.9 7.7   RBC 2.25* 2.29* 2.35*   HEMOGLOBIN 7.5* 7.5* 7.8*   HEMATOCRIT 23.7* 24.4* 24.8*   .3* 106.6* 105.5*   MCH 33.3* 32.8 33.2*   MCHC 31.6* 30.7* 31.5*   RDW 57.8* 59.0* 57.7*   PLATELETCT 222 233 239   MPV 9.1 9.3 9.1     Recent Labs     06/26/21  0559 06/27/21  0247 06/28/21  0355   SODIUM 136 139 134*   POTASSIUM 4.6 4.9 4.6   CHLORIDE 99 106 101   CO2 27 24 23   GLUCOSE 125*  107* 118*   BUN 44* 23* 41*   CREATININE 6.25* 3.88* 5.20*   CALCIUM 8.9 8.8 9.3     IMAGING:   DX-CHEST-2 VIEWS  Order: [id number]  Status:  Final result   Visible to patient:  Yes (Jeredhart) Next appt:  06/25/2021 at 02:00 PM in Cardiology (LEWIS [email or web address])   0 Result Notes  Details    Reading Physician Reading Date Result Priority   Cesar Parada M.D.  [phone] 6/21/2021 Routine      Narrative & Impression     6/21/2021 10:57 AM     HISTORY/REASON FOR EXAM:  Shortness of Breath.        TECHNIQUE/EXAM DESCRIPTION AND NUMBER OF VIEWS:  Two views of the chest.     COMPARISON:  6/18/2021     FINDINGS:     Cardiomediastinal silhouette is stable.     Very large right pleural effusion which has increased in size from prior study. There is associated compressive atelectasis of the right lung. Correlate clinically for infection. No pneumothorax.     No acute osseous abnormality.     IMPRESSION:     Large right pleural effusion which has increased in size from prior study.        Available labs, imaging and clinical documentation reviewed.     ASSESSMENT:  # End-stage renal disease, on hemodialysis Tuesday, Thursday and Saturday at   [facility]   - L AVF, s/p ligation 6/23 with Dr. Nguyen   - Now has L IJ TDC placed 6/23  # Chronic hypotension, stable    - On midodrine 10 mg TID   # Peripheral vascular disease, severe   - s/p bilateral LE amputations  # Volume overload/shortness of breath/pleural effusion on the right, improved    -s/p R thoracentesis with 2.4L removed 6/23   # Cardiomyopathy with an ejection fraction of 15-20%  # History of 80% ostial LAD in-stent restenosis  # History of steal syndrome, status post DRIL procedure   - s/p ligation LUE AVF 6/23   # Atrial fibrillation, rate controlled    - On Eliquis and carvedilol   # Diabetes mellitus--management per primary svc  # Hyperthyroidism, on Tapazole   # Anemia in CKD, below target    - Goal Hgb 10-11   - ARMANDO with HD  # CAD   - On statin/plavix    # MBD-CKD   - Managed at OP unit   - On calcitriol and sevelamer   - Recent phos 4.1  # Prognosis   - Poor   - Declined hospice at this time     SUGGESTIONS:   -TTS iHD  -UF as able  -Stopped ACEi 6/24  -Continue midodrine 10 mg TID   -No dietary protein restrictions  -Renal/low sodium/diabetic diet  -ARMANDO with HD, goal Hgb 10-11   -Renal dose medications for ESRD   -Max dose gabapentin in ESRD is 300 mg/day  -Continue calcitriol and sevelamer with meals   -Ongoing GOC discussions; pt is at high risk for further morbidity/mortality  -Needs OP FU with Vascular in 1-2 weeks. Consideration for thigh graft placement as OP.  -OK to transition to OP HD once medically cleared. SNF planning underway.

## 2021-06-29 PROBLEM — G89.29 OTHER CHRONIC PAIN: Status: ACTIVE | Noted: 2021-01-01

## 2021-07-01 PROBLEM — I95.3 HEMODIALYSIS-ASSOCIATED HYPOTENSION: Status: ACTIVE | Noted: 2019-12-02

## 2021-07-02 NOTE — TELEPHONE ENCOUNTER
"Pt's wife requesting information on diabetic medication.  Also would like phone number to reach MD who cared for patient in the hospital.    Reason for Disposition  • Information only question and nurse able to answer    Additional Information  • Negative: Nursing judgment  • Negative: Nursing judgment  • Negative: Nursing judgment  • Negative: Nursing judgment    Answer Assessment - Initial Assessment Questions  1. REASON FOR CALL or QUESTION: \"What is your reason for calling today?\" or \"How can I best help you?\" or \"What question do you have that I can help answer?\"      Question regarding diabetic medication as well as information on new PCP    Protocols used: INFORMATION ONLY CALL - NO TRIAGE-A-OH      "

## 2021-07-02 NOTE — PROGRESS NOTES
Chief Complaint   Patient presents with   • Coronary Artery Disease   • Congestive Heart Failure       Subjective:   Adalid Sepulveda is a 75 y.o. male who presents today as a hospital follow-up for heart failure with reduced ejection fraction.  He was in the hospital and underwent a coronary angiogram showing a ostial LAD stenosis which had a PTCA performed.  Since then he is back up in the hospital.  He is battling issues with dialysis.  He had a steal syndrome in his left hand and is losing his left fingers due to ischemia.  He is not having any chest pain.  His blood pressure is mildly controlled.  He is on midodrine.    Past Medical History:   Diagnosis Date   • Arrhythmia     hx a fib   • Arthritis 12/29/2020    knees, ankles, back   • CAD (coronary artery disease)     stents   • Chronic anticoagulation 3/26/2020   • Chronic kidney disease (CKD), stage V ([facility])    • Congestive heart failure (HCC)     hx of   • Dental disorder 12/29/2020    partial upper   • Diabetes    • Hemodialysis patient (HCC)     Tuesday, Thursday, Saturday   • Hypertension    • Kidney failure    • Myocardial infarct (HCC)     ? 2019    • Osteoarthritis    • Peripheral neuropathy    • Pneumonia     per hx   • Sleep apnea     does not use cpap anymore     Past Surgical History:   Procedure Laterality Date   • AV FISTULA REVISION Left 6/23/2021    Procedure: UPPER EXTREMITY, VENOGRAM;  Surgeon: John Nguyen M.D.;  Location: [facility];  Service: Vascular   • VEIN LIGATION Left 6/23/2021    Procedure: LIGATION, VEIN;  Surgeon: John Nguyen M.D.;  Location: [facility];  Service: Vascular   • CATH PLACEMENT Left 6/23/2021    Procedure: INSERTION, CATHETER -  PERMA CATH;  Surgeon: John Nguyen M.D.;  Location: [facility];  Service: Vascular   • KNEE AMPUTATION BELOW Left 5/28/2021    Procedure: AMPUTATION, BELOW KNEE.;  Surgeon: Jarett Márquez M.D.;  Location: [facility];   Service: Orthopedics   • KNEE AMPUTATION BELOW Right 2020    Procedure: AMPUTATION, BELOW KNEE ...;  Surgeon: Leobardo Lynn M.D.;  Location: [facility];  Service: Orthopedics   • TOE AMPUTATION Right 2020    Procedure: AMPUTATION, TOE GREAT;  Surgeon: Leobardo Lynn M.D.;  Location: [facility];  Service: Orthopedics   • TOE AMPUTATION Left 2020    Procedure: AMPUTATION, TOE GREAT;  Surgeon: Leobardo Lynn M.D.;  Location: [facility];  Service: Orthopedics   • TENOTOMY Left 2020    Procedure: FLEXOR TENOTOMIES, TWO-FIVE TOES;  Surgeon: Leobrado Lynn M.D.;  Location: [facility];  Service: Orthopedics   • APPENDECTOMY     • OTHER CARDIAC SURGERY      stents x1   • OTHER ORTHOPEDIC SURGERY      knee surgery     Family History   Problem Relation Age of Onset   • Heart Disease Mother    • Alcohol/Drug Father    • Thyroid Son    • Diabetes Brother    • Hypertension Neg Hx    • Hyperlipidemia Neg Hx      Social History     Socioeconomic History   • Marital status:      Spouse name: Not on file   • Number of children: Not on file   • Years of education: Not on file   • Highest education level: Some college, no degree   Occupational History   • Not on file   Tobacco Use   • Smoking status: Former Smoker     Packs/day: 1.00     Years: 55.00     Pack years: 55.00     Types: Cigarettes     Quit date: 2014     Years since quittin.4   • Smokeless tobacco: Never Used   Vaping Use   • Vaping Use: Never used   Substance and Sexual Activity   • Alcohol use: Not Currently   • Drug use: No   • Sexual activity: Not on file   Other Topics Concern   • Not on file   Social History Narrative   • Not on file     Social Determinants of Health     Financial Resource Strain:    • Difficulty of Paying Living Expenses:    Food Insecurity:    • Worried About Running Out of Food in the Last Year:    • Ran Out of Food in the Last Year:    Transportation  "Needs: Unmet Transportation Needs   • Lack of Transportation (Medical): Yes   • Lack of Transportation (Non-Medical): Yes   Physical Activity: Inactive   • Days of Exercise per Week: 0 days   • Minutes of Exercise per Session: 0 min   Stress: Stress Concern Present   • Feeling of Stress : To some extent   Social Connections: Socially Isolated   • Frequency of Communication with Friends and Family: More than three times a week   • Frequency of Social Gatherings with Friends and Family: More than three times a week   • Attends Yarsanism Services: Never   • Active Member of Clubs or Organizations: No   • Attends Club or Organization Meetings: Never   • Marital Status:    Intimate Partner Violence:    • Fear of Current or Ex-Partner:    • Emotionally Abused:    • Physically Abused:    • Sexually Abused:      Allergies   Allergen Reactions   • Mircera [Methoxy Polyethylene Glycol-Epoetin Beta] Hives   • Other Drug      \"Opiods\" caused  hallucinations     Outpatient Encounter Medications as of 7/2/2021   Medication Sig Dispense Refill   • oxyCODONE-acetaminophen (PERCOCET) 5-325 MG Tab Take 1 tablet by mouth every four hours as needed for up to 5 days. 15 tablet 0   • traMADol (ULTRAM) 50 MG Tab Take 0.5 Tablets by mouth every 6 hours as needed for Mild Pain for up to 5 days. 10 tablet 0   • guaiFENesin (ROBITUSSIN) 100 MG/5ML Solution Take 10 mL by mouth every 6 hours as needed.     • ipratropium-albuterol (DUONEB) 0.5-2.5 (3) MG/3ML nebulizer solution Take 3 mL by nebulization 4 times a day.     • Guaifenesin 400 MG Tab Take 400 mg by mouth in the morning, at noon, and at bedtime.     • furosemide (LASIX) 20 MG Tab Take 20 mg by mouth every day.     • budesonide (PULMICORT) 0.5 MG/2ML Suspension Take 500 mcg by nebulization 2 times a day.     • carvedilol (COREG) 3.125 MG Tab Take 3.125 mg by mouth 2 times a day.     • traZODone (DESYREL) 50 MG Tab Take 1 tablet by mouth at bedtime for 30 days. 30 tablet 0   • " apixaban (ELIQUIS) 5mg Tab Take 1 tablet by mouth 2 times a day. Indications: Thromboembolism secondary to Atrial Fibrillation 60 tablet 0   • omeprazole (PRILOSEC) 20 MG delayed-release capsule Take 1 capsule by mouth 2 times a day. (Patient taking differently: Take 20 mg by mouth every day.) 30 capsule 0   • atorvastatin (LIPITOR) 40 MG Tab Take 1 tablet by mouth at bedtime. 30 tablet 0   • calcitRIOL (ROCALTROL) 0.25 MCG Cap Take 1 capsule by mouth every day. 30 capsule 0   • gabapentin (NEURONTIN) 300 MG Cap Take 1 capsule by mouth every day. 30 capsule 0   • methimazole (TAPAZOLE) 5 MG Tab Take 1 tablet by mouth 2 times a day. 60 tablet 0   • tamsulosin (FLOMAX) 0.4 MG capsule Take 2 Capsules by mouth at bedtime. 30 capsule 0   • sevelamer carbonate (RENVELA) 800 MG Tab tablet Take 2 Tablets by mouth 3 times a day with meals. 90 tablet 0   • midodrine (PROAMATINE) 10 MG tablet Take 1 tablet by mouth 3 times a day with meals. (Patient taking differently: Take 10 mg by mouth in the morning, at noon, and at bedtime.) 90 tablet 0   • clopidogrel (PLAVIX) 75 MG Tab Take 1 tablet by mouth every day. 30 tablet 0   • [DISCONTINUED] oxyCODONE-acetaminophen (PERCOCET) 5-325 MG Tab Take 1 tablet by mouth every four hours as needed. (Patient not taking: Reported on 7/2/2021)       No facility-administered encounter medications on file as of 7/2/2021.     Review of Systems   Constitutional: Negative.  Negative for chills, fever and malaise/fatigue.   HENT: Negative.  Negative for sore throat.    Eyes: Negative.    Respiratory: Negative.  Negative for cough, hemoptysis, sputum production, shortness of breath, wheezing and stridor.    Cardiovascular: Negative.  Negative for chest pain, palpitations, orthopnea, claudication, leg swelling and PND.   Gastrointestinal: Negative.    Genitourinary: Negative.    Musculoskeletal: Negative.    Skin: Negative.    Neurological: Negative.  Negative for dizziness, loss of consciousness  "and weakness.   Endo/Heme/Allergies: Negative.  Does not bruise/bleed easily.   All other systems reviewed and are negative.       Objective:   BP (!) 86/58 (BP Location: Right arm, Patient Position: Sitting, BP Cuff Size: Adult)   Pulse 71   Resp 16   Ht 1.676 m (5' 6\")   SpO2 96%   BMI 11.75 kg/m²     Physical Exam   Constitutional: He appears well-developed. No distress.   HENT:   Head: Normocephalic and atraumatic.   Right Ear: External ear normal.   Left Ear: External ear normal.   Nose: Nose normal.   Mouth/Throat: No oropharyngeal exudate.   Eyes: Pupils are equal, round, and reactive to light. Conjunctivae are normal. Right eye exhibits no discharge. Left eye exhibits no discharge. No scleral icterus.   Neck: No JVD present.   Cardiovascular: Normal rate and regular rhythm. Exam reveals no gallop and no friction rub.   No murmur heard.  Pulmonary/Chest: Effort normal. No stridor. No respiratory distress. He has no wheezes. He has no rales. He exhibits no tenderness.   Abdominal: Soft. He exhibits no distension. There is no guarding.   Musculoskeletal:         General: No tenderness or deformity. Normal range of motion.      Cervical back: Neck supple.   Neurological: He is alert. He has normal reflexes. He displays normal reflexes. No cranial nerve deficit. He exhibits normal muscle tone. Coordination normal.   Skin: Skin is warm and dry. No rash noted. He is not diaphoretic. No erythema. No pallor.   Psychiatric: His behavior is normal. Judgment and thought content normal.   Nursing note and vitals reviewed.      Assessment:     1. Chronic congestive heart failure, unspecified heart failure type (HCC)     2. Cardiomyopathy, ischemic     3. Coronary artery disease involving native coronary artery of native heart without angina pectoris     4. Acute respiratory failure with hypoxia (HCC)     5. Acute on chronic systolic congestive heart failure (HCC)     6. Anticoagulated     7. Atrial fibrillation with " RVR ([facility])     8. DNR (do not resuscitate)     9. Dyslipidemia     10. End stage renal disease on dialysis ([facility])     11. Hemodialysis-associated hypotension     12. Impaired mobility and ADLs     13. Left anterior fascicular block     14. Type 2 diabetes mellitus with chronic kidney disease on chronic dialysis, with long-term current use of insulin ([facility])     15. S/P BKA (below knee amputation) unilateral, left ([facility])         Medical Decision Making:  Today's Assessment / Status / Plan:     75-year-old male with end-stage renal disease on dialysis with a steal syndrome and likely loss of his left fingers.  For his heart failure we will simply keep him on the same medications with no changes today.  His shortness of breath needs to be better managed with dialysis which they are working on.  His heart failure should improve on medical therapy if it is resulted from his stenosis.  We will recheck an echocardiogram likely in 3 to 6 months.  We will see him back in 4 weeks.

## 2021-07-08 PROBLEM — J96.20 ACUTE ON CHRONIC RESPIRATORY FAILURE (HCC): Status: ACTIVE | Noted: 2021-01-01

## 2021-07-08 PROBLEM — Z89.511 STATUS POST BELOW-KNEE AMPUTATION OF BOTH LOWER EXTREMITIES (HCC): Status: ACTIVE | Noted: 2021-01-01

## 2021-07-08 PROBLEM — J96.10 CHRONIC RESPIRATORY FAILURE (HCC): Status: ACTIVE | Noted: 2021-01-01

## 2021-07-08 NOTE — OR SURGEON
EXAMINATION:  7/8/2021 10:30 AM    HISTORY/REASON FOR EXAM:  Fluid collection    TECHNIQUE/EXAM DESCRIPTION:   Ultrasound-guided thoracentesis.    Indication:  RIGHT pleural fluid collection.    COMPARISON:  Chest x-ray 7/7/2021    PROCEDURE:     Informed consent was obtained. A timeout was taken. A right pleural effusion was localized with real-time ultrasound guidance. The right posterior chest wall was prepped and draped in a sterile manner. Following local anesthesia with 1% lidocaine, a 5 Sami Yueh pigtail catheter was advanced into the pleural space with trocar technique and pleural fluid was drained. The patient tolerated the procedure well without evidence of complication. A post thoracentesis chest radiograph is forthcoming.    FINDINGS:  RIGHT pleural fluid demonstrated    Fluid was sent to the laboratory.    Fluid character: straw colored    IMPRESSION:  1. Ultrasound guided right sided diagnostic/therapeutic thoracentesis.    2. 2050 mL of fluid withdrawn.

## 2021-07-08 NOTE — PROGRESS NOTES
Dr. Scott consented patient and performed right Thoracentesis in U/S room 2. Vitals monitored during procedure and documented in EPIC. 2050ml of fluid removed,  1050ml of fluid sent to lab .  Post thora xray performed. Pt transported back up to room STAT, during procedure vitals were difficult to obtain probably secondary to pts peripheral artery disease. Pulse ox was jumping around through out exam. Pt did experience a blood pressure drop and was coughing a lot at one point vomiting. Transport took him directly to room by bed. JAN Osborn was called and informed of the patients difficulties during his procedure and made aware he was coming back up to the room currently with transport.

## 2021-07-08 NOTE — ASSESSMENT & PLAN NOTE
History of  Continue outpatient meds: plavix, statin  Holding lisinopril and coreg due to hypotension

## 2021-07-08 NOTE — ED TRIAGE NOTES
"Chief Complaint   Patient presents with   • Shortness of Breath   • Cough     76 yo male bib EMS from [facility] for above complaint. Patient reports hx of cough \"for years\", but seems to be worse today. Patient reports difficulty taking a deep breath. Bilateral lung sounds clear. Patient states he has noticed some \"dark brown to black sputum\" when coughing intermittently.   Patient denies fevers or N/V/D. Hx of CHF and kidney failure.    CXR from outside facility shows \"pulmonary vascular congestion with moderate right pleurla effusion. Slight left pleural effusion\".    Patient has port placed for dialysis. Last dialysis appt yesterday.    VSS on arrival. Patient on 4L O2 at baseline  No interventions per EMS    /47   Pulse 79   Temp 36.7 °C (98 °F) (Temporal)   Resp 18   Ht 1.676 m (5' 6\")   Wt 72.6 kg (160 lb)   SpO2 94%   BMI 25.82 kg/m²     "

## 2021-07-08 NOTE — CARE PLAN
The patient is Watcher - Medium risk of patient condition declining or worsening         Progress made toward(s) clinical / shift goals: Patient is compliant with POC. Patient has remained free from falls or injury.     Patient is not progressing towards the following goals:      Problem: Care Map:  Day 1 Optimal Outcome for the Heart Failure Patient  Goal: Day 1:  Optimal Care of the heart failure patient  Outcome: Progressing  Note: Patient has been compliant with his heart medications. He's been educated on his diagnoses. Patient is compliant with dialysis and thoracentesis.      Problem: Pain - Standard  Goal: Alleviation of pain or a reduction in pain to the patient’s comfort goal  Outcome: Progressing  Flowsheets (Taken 7/8/2021 0242)  Non Verbal Scale:   Calm   Sleeping  Munguia-James Scale: 2   OB Pain Level: 1-Coping  Pain Rating Scale (NPRS): 1  Note: Patient has only needed tramadol on admission. He hasn't been in pain since. Will continue to monitor.

## 2021-07-08 NOTE — ASSESSMENT & PLAN NOTE
Hx of PAD with escar noted on L thumb, index and middle finger  7/14 MRI Hand without sign of osteomyelitis

## 2021-07-08 NOTE — H&P
Hospital Medicine History & Physical Note    Date of Service  7/7/2021    Primary Care Physician  So Ac M.D.    Consultants  nephrology    Specialist Names: Dr. Anthony    Code Status  Full Code    Chief Complaint  Chief Complaint   Patient presents with   • Shortness of Breath   • Cough       History of Presenting Illness  Luis Sepulveda is a 75 y.o. male with past medical history of coronary artery disease with stent, systolic heart failure with LVEF 30%, diabetes mellitus, end-stage renal disease on hemodialysis Tuesday Thursday Saturday essential hypertension, atrial fibrillation, peripheral vascular disease with history of bilateral BKA follow-up with Dr. Nguyen who presented 7/7/2021 with shortness of breath started earlier today around 3 AM.  He use 4 L of oxygen home since he was discharged from the hospital a few weeks ago.  He continued to cough with phlegm production.  Denies any fever and chills.  In the ER he was found to have right-sided pleural effusion and volume overload.  Nephrology was consulted and will have hemodialysis plan for him tomorrow.  He has history of pleural effusion a few weeks ago with he had thoracentesis before.  He will be admitted for further management.    I discussed the plan of care with patient and family.    Review of Systems  ROS    Past Medical History   has a past medical history of Arrhythmia, Arthritis (12/29/2020), CAD (coronary artery disease), Chronic anticoagulation (3/26/2020), Chronic kidney disease (CKD), stage V (HCC), Congestive heart failure (HCC), Dental disorder (12/29/2020), Diabetes, Hemodialysis patient (HCC), Hypertension, Kidney failure, Myocardial infarct (HCC), Osteoarthritis, Peripheral neuropathy, Pneumonia, and Sleep apnea.    Surgical History   has a past surgical history that includes appendectomy; other orthopedic surgery; other cardiac surgery; toe amputation (Left, 5/17/2020); tenotomy (Left, 5/17/2020); toe amputation  "(Right, 11/16/2020); knee amputation below (Right, 12/31/2020); knee amputation below (Left, 5/28/2021); av fistula revision (Left, 6/23/2021); vein ligation (Left, 6/23/2021); and cath placement (Left, 6/23/2021).     Family History  family history includes Alcohol/Drug in his father; Diabetes in his brother; Heart Disease in his mother; Thyroid in his son.   Family history reviewed with patient. There is family history that is pertinent to the chief complaint.     Social History   reports that he quit smoking about 7 years ago. His smoking use included cigarettes. He has a 55.00 pack-year smoking history. He has never used smokeless tobacco. He reports previous alcohol use. He reports that he does not use drugs.    Allergies  Allergies   Allergen Reactions   • Mircera [Methoxy Polyethylene Glycol-Epoetin Beta] Hives   • Other Drug Unspecified     \"Opioids\" caused hallucinations       Medications  Prior to Admission Medications   Prescriptions Last Dose Informant Patient Reported? Taking?   Guaifenesin 400 MG Tab 7/7/2021 at 1153 MAR from Other Facility Yes No   Sig: Take 400 mg by mouth 3 times a day.   acetaminophen (TYLENOL) 325 MG Tab 7/7/2021 at 1523 MAR from Other Facility Yes Yes   Sig: Take 650 mg by mouth 3 times a day. 2 tablets = 650 mg.   apixaban (ELIQUIS) 5mg Tab 7/7/2021 at 0854 MAR from Other Facility No No   Sig: Take 1 tablet by mouth 2 times a day. Indications: Thromboembolism secondary to Atrial Fibrillation   atorvastatin (LIPITOR) 40 MG Tab 7/6/2021 at 2014 MAR from Other Facility No No   Sig: Take 1 tablet by mouth at bedtime.   budesonide (PULMICORT) 0.5 MG/2ML Suspension 6/29/2021 at 0755; DISCONTINUED MAR from Other Facility Yes No   Sig: Take 500 mcg by nebulization 2 times a day. DISCONTINUED.   calcitRIOL (ROCALTROL) 0.25 MCG Cap 7/7/2021 at 0854 MAR from Other Facility No No   Sig: Take 1 capsule by mouth every day.   carvedilol (COREG) 3.125 MG Tab 7/7/2021 at 0854 MAR from Other " Facility Yes No   Sig: Take 3.125 mg by mouth 2 times a day.   clopidogrel (PLAVIX) 75 MG Tab 7/7/2021 at 0854 MAR from Other Facility No No   Sig: Take 1 tablet by mouth every day.   furosemide (LASIX) 40 MG Tab 7/7/2021 at 0854 MAR from Other Facility Yes No   Sig: Take 40 mg by mouth see administration instructions. Take 1 tablet (40 mg) by mouth once per day on Sundays, Mondays, Wednesdays, and Fridays only (non-dialysis days).   gabapentin (NEURONTIN) 300 MG Cap 7/7/2021 at 0854 MAR from Other Facility No No   Sig: Take 1 capsule by mouth every day.   guaiFENesin (ROBITUSSIN) 100 MG/5ML Solution 6/29/2021 at 1138 MAR from Other Facility Yes No   Sig: Take 10 mL by mouth every 6 hours as needed for Cough.   insulin glargine (LANTUS SOLOSTAR) 100 UNIT/ML Solution Pen-injector injection 7/6/2021 at 2050 MAR from Other Facility Yes Yes   Sig: Inject 10 Units under the skin at bedtime.   ipratropium-albuterol (DUONEB) 0.5-2.5 (3) MG/3ML nebulizer solution 7/7/2021 at 0854 MAR from Other Facility Yes No   Sig: Take 3 mL by nebulization 2 times a day.   levoFLOXacin (LEVAQUIN) 250 MG Tab 6/16/2021 at 2045; FINISHED MAR from Other Facility Yes Yes   Sig: Take 250 mg by mouth at bedtime. 7 day course started 6/10/2021. FINISHED.   methimazole (TAPAZOLE) 5 MG Tab 7/7/2021 at 0854 MAR from Other Facility No No   Sig: Take 1 tablet by mouth 2 times a day.   midodrine (PROAMATINE) 10 MG tablet 7/7/2021 at 1153 MAR from Other Facility No No   Sig: Take 1 tablet by mouth 3 times a day with meals.   omeprazole (PRILOSEC) 20 MG delayed-release capsule 7/7/2021 at 0854 MAR from Other Facility No No   Sig: Take 1 capsule by mouth 2 times a day.   oxyCODONE-acetaminophen (PERCOCET) 5-325 MG Tab 7/5/2021 at 2219 MAR from Other Facility Yes Yes   Sig: Take 1 tablet by mouth every four hours as needed for Severe Pain.   sevelamer carbonate (RENVELA) 800 MG Tab tablet 7/7/2021 at 1153 MAR from Other Facility No No   Sig: Take 2  Tablets by mouth 3 times a day with meals.   tamsulosin (FLOMAX) 0.4 MG capsule 7/6/2021 at 2014 MAR from Other Facility No No   Sig: Take 2 Capsules by mouth at bedtime.   traMADol (ULTRAM) 50 MG Tab 7/4/2021 at 1627 MAR from Other Facility Yes Yes   Sig: Take 50 mg by mouth every 6 hours as needed for Moderate Pain.   traZODone (DESYREL) 50 MG Tab 7/6/2021 at 2014 MAR from Other Facility Yes Yes   Sig: Take 50 mg by mouth at bedtime.      Facility-Administered Medications: None       Physical Exam  Temp:  [36.7 °C (98 °F)] 36.7 °C (98 °F)  Pulse:  [78-85] 85  Resp:  [17-19] 19  BP: (104-114)/(47-55) 114/51  SpO2:  [94 %-99 %] 94 %    Physical Exam    Laboratory:  Recent Labs     07/07/21  1823   WBC 7.9   RBC 2.58*   HEMOGLOBIN 8.2*   HEMATOCRIT 26.6*   .1*   MCH 31.8   MCHC 30.8*   RDW 56.0*   PLATELETCT 305   MPV 9.2     Recent Labs     07/07/21  1823   SODIUM 137   POTASSIUM 4.7   CHLORIDE 96   CO2 30   GLUCOSE 73   BUN 30*   CREATININE 4.75*   CALCIUM 9.3     Recent Labs     07/07/21  1823   ALTSGPT <5   ASTSGOT 10*   ALKPHOSPHAT 147*   TBILIRUBIN <0.2   GLUCOSE 73         No results for input(s): NTPROBNP in the last 72 hours.      No results for input(s): TROPONINT in the last 72 hours.    Imaging:  DX-CHEST-PORTABLE (1 VIEW)   Final Result      1.  Moderate to large right pleural effusion with overlying atelectasis/consolidation.   2.  Interstitial prominence likely represents interstitial edema.   3.  Stable cardiomegaly.      US-THORACENTESIS PUNCTURE RIGHT    (Results Pending)       X-Ray:  I have personally reviewed the images and compared with prior images.  EKG:  I have personally reviewed the images and compared with prior images.    Assessment/Plan:  I anticipate this patient will require at least two midnights for appropriate medical management, necessitating inpatient admission.    Pleural effusion- (present on admission)  Assessment & Plan  Right side  Ordered us thoracentesis  To hold  eliquis    Acute on chronic systolic congestive heart failure (HCC)- (present on admission)  Assessment & Plan  LVEF 30%  On iv lasix 40 mg bid  Lisinopril, carvedilol  2 g salt  Adjust meds as needed    Paroxysmal atrial fibrillation (HCC)- (present on admission)  Assessment & Plan  Rate controlled  On carvedilol, eliquis: holding for thoracentesis    End stage renal disease on dialysis ([facility])- (present on admission)  Assessment & Plan  Plan for HD tomorrow  Nephro on    Type 2 diabetes mellitus with kidney complication, with long-term current use of insulin ([facility])- (present on admission)  Assessment & Plan  On SSI  A1c 5.7 2 months ago    CAD (coronary artery disease)- (present on admission)  Assessment & Plan  History of  Continue outpatient meds      VTE prophylaxis: therapeutic anticoagulation with eliquis

## 2021-07-08 NOTE — CONSULTS
"[facility] -  CONSULTATION NOTE               Author: Kulwant Hodges M.D. Date & Time: 7/8/2021  1:11 PM       REASON FOR CONSULTATION:   - Inpatient hemodialysis management.    CHIEF COMPLAINT:   -  \"SOB  \"    HISTORY OF PRESENT ILLNESS:        74 y/o man with ESRD HD T,TH,SAt presented with SOB. Pt has reccurrent pleural effusion. Pt /sp thoracentisis in the past.  Pt with recent ligation of left arm AVF  Pt makes a small amount of urine    No F/C/N/V/CP  No melena, hematochezia, hematemesis.  No HA, visual changes, or abdominal pain.    REVIEW OF SYSTEMS:    10 point ROS was performed and is as per HPI or otherwise negative    PAST MEDICAL HISTORY:   1.  ESRD, on dialysis Tuesday, Thursday and Saturday.  2.  Chronic hypotension.  3.  Anemia of chronic kidney disease.  4.  Renal osteodystrophy.  5.  Congestive heart failure.  6.  Peripheral vascular disease.  7.  AFib.      PAST SURGICAL HISTORY:   - AV fistula ligation    FAMILY HISTORY:   - Reviewed and non contributory to current illness    SOCIAL HISTORY:   - No tobacco  - No EtOH  - No illicits  - newly marreid  HOME MEDICATIONS:   - Reviewed and documented in chart    LABORATORY STUDIES:   - Reviewed and documented in chart    ALLERGIES:  Mircera [methoxy polyethylene glycol-epoetin beta] and Other drug    VS:  /46   Pulse 80   Temp 36.4 °C (97.5 °F) (Temporal)   Resp 16   Ht 1.676 m (5' 6\")   Wt 74.2 kg (163 lb 9 oz)   SpO2 100%   BMI 26.40 kg/m²   Physical Exam  Vitals and nursing note reviewed.   Constitutional:       Appearance: Normal appearance.   HENT:      Head: Normocephalic and atraumatic.      Mouth/Throat:      Mouth: Mucous membranes are moist.   Eyes:      Pupils: Pupils are equal, round, and reactive to light.   Neck:      Comments: Tunnel cath in palce  Cardiovascular:      Rate and Rhythm: Normal rate and regular rhythm.   Pulmonary:      Breath sounds: Rales present.   Abdominal:      General: " Abdomen is flat.      Palpations: Abdomen is soft.   Musculoskeletal:      Comments: B/l bka  Necrotic fingers on left hand   Neurological:      General: No focal deficit present.      Mental Status: He is oriented to person, place, and time.   Psychiatric:         Mood and Affect: Mood normal.            FLUID BALANCE:  No intake/output data recorded.    LABS:  Recent Labs     07/07/21  1823 07/08/21  0319   SODIUM 137 132*   POTASSIUM 4.7 4.3   CHLORIDE 96 93*   CO2 30 28   GLUCOSE 73 120*   BUN 30* 37*   CREATININE 4.75* 5.03*   CALCIUM 9.3 9.1        IMAGING:  - All imaging reviewed from admission to present day    IMPRESSION:  # ESRD T,TH,Sat  - Etiology likely 2/2 vascualr disease  # Hypotension chronic  # Anemia of CKD  # PVD  #SOB multifacxtorial including effusion  #CHF with decreased EF 30%    PLAN:  - HD today thursday  - Thoracentisis  - EPO  - Midodrine  - Dose all meds per ESRD    Thank you for the consultation!

## 2021-07-08 NOTE — ED PROVIDER NOTES
"ED Provider Note    Scribed for Christoph Mckinley M.D. by Carla Echevarria. 7/7/2021,  7:10 PM.    Means of Arrival: EMS  History obtained from: Patient  History limited by: None    CHIEF COMPLAINT  Chief Complaint   Patient presents with    Shortness of Breath    Cough     HPI  Luis Sepulveda is a 75 y.o. male who presents to the Emergency Department via EMS from [facility] for evaluation of worsening shortness of breath and cough onset 3 AM. Per patient, he was at his baseline when he went to bed the night before. The patient reports having a cough \"for years,\" but seems to be worse today. The patient states he has noticed some \"dark brown to black sputum\" when coughing intermittently. The patient was hospitalized for a similar presentation a couple of weeks ago. The patient has significant orthopnea. He endorses associated nausea. The patient has a history of COPD, CHF, diabetes mellitus type II, kidney failure and is currently on dialysis Tuesdays, Thursdays, and Saturdays. His last dialysis appointment was yesterday. He is anticoagulated on Eliquis and is on Lasix. A recent chest x-ray from outside facility shows right pleural effusion and slight left pleural effusion.  The patient has bilateral below the knee amputations. The patient has a left failed fistular and eschar to three digits that per his wife, are improving. Denies associated headache, sore throat, vomiting, chest pain, abdominal pain, dysuria, hematuria, or fevers. No changes in bowel movements. No known sick contact.     REVIEW OF SYSTEMS  CONSTITUTIONAL:  No fever.  HENT: No sore throat.   CARDIOVASCULAR:  No chest pain.   RESPIRATORY:  Shortness of breath, cough, and orthopnea.   GASTROINTESTINAL:  Nausea. No abdominal pain or vomiting.   GENITOURINARY:   No dysuria or hematuria.   NEUROLOGIC:   No headache.  See HPI for further details.   All other systems are negative.     PAST MEDICAL HISTORY  Past Medical History: "   Diagnosis Date    Arrhythmia     hx a fib    Arthritis 12/29/2020    knees, ankles, back    CAD (coronary artery disease)     stents    Chronic anticoagulation 3/26/2020    Chronic kidney disease (CKD), stage V (HCC)     Congestive heart failure (HCC)     hx of    Dental disorder 12/29/2020    partial upper    Diabetes     Hemodialysis patient (HCC)     Tuesday, Thursday, Saturday    Hypertension     Kidney failure     Myocardial infarct (HCC)     ? 2019     Osteoarthritis     Peripheral neuropathy     Pneumonia     per hx    Sleep apnea     does not use cpap anymore     FAMILY HISTORY  Family History   Problem Relation Age of Onset    Heart Disease Mother     Alcohol/Drug Father     Thyroid Son     Diabetes Brother     Hypertension Neg Hx     Hyperlipidemia Neg Hx      SOCIAL HISTORY   reports that he quit smoking about 7 years ago. His smoking use included cigarettes. He has a 55.00 pack-year smoking history. He has never used smokeless tobacco. He reports previous alcohol use. He reports that he does not use drugs.    SURGICAL HISTORY  Past Surgical History:   Procedure Laterality Date    AV FISTULA REVISION Left 6/23/2021    Procedure: UPPER EXTREMITY, VENOGRAM;  Surgeon: John Nguyen M.D.;  Location: [facility];  Service: Vascular    VEIN LIGATION Left 6/23/2021    Procedure: LIGATION, VEIN;  Surgeon: John Nguyen M.D.;  Location: [facility];  Service: Vascular    CATH PLACEMENT Left 6/23/2021    Procedure: INSERTION, CATHETER -  PERMA CATH;  Surgeon: John Nguyen M.D.;  Location: [facility];  Service: Vascular    KNEE AMPUTATION BELOW Left 5/28/2021    Procedure: AMPUTATION, BELOW KNEE.;  Surgeon: Jarett Márquez M.D.;  Location: [facility];  Service: Orthopedics    KNEE AMPUTATION BELOW Right 12/31/2020    Procedure: AMPUTATION, BELOW KNEE ...;  Surgeon: Leobardo Lynn M.D.;  Location: [facility];  Service: Orthopedics    TOE  AMPUTATION Right 11/16/2020    Procedure: AMPUTATION, TOE GREAT;  Surgeon: Leobardo Lynn M.D.;  Location: [facility];  Service: Orthopedics    TOE AMPUTATION Left 5/17/2020    Procedure: AMPUTATION, TOE GREAT;  Surgeon: Leobardo Lynn M.D.;  Location: SUR[facility]ERY [facility];  Service: Orthopedics    TENOTOMY Left 5/17/2020    Procedure: FLEXOR TENOTOMIES, TWO-FIVE TOES;  Surgeon: Leobardo Lynn M.D.;  Location: SURGERY [facility];  Service: Orthopedics    APPENDECTOMY      OTHER CARDIAC SURGERY      stents x1    OTHER ORTHOPEDIC SURGERY      knee surgery     CURRENT MEDICATIONS  Home Medications       Reviewed by Sandra Ruiz (Pharmacy Tech) on 07/07/21 at 2055  Med List Status: Complete     Medication Last Dose Status   acetaminophen (TYLENOL) 325 MG Tab 7/7/2021 Active   apixaban (ELIQUIS) 5mg Tab 7/7/2021 Active   atorvastatin (LIPITOR) 40 MG Tab 7/6/2021 Active   budesonide (PULMICORT) 0.5 MG/2ML Suspension  Active   calcitRIOL (ROCALTROL) 0.25 MCG Cap 7/7/2021 Active   carvedilol (COREG) 3.125 MG Tab 7/7/2021 Active   clopidogrel (PLAVIX) 75 MG Tab 7/7/2021 Active   furosemide (LASIX) 40 MG Tab 7/7/2021 Active   gabapentin (NEURONTIN) 300 MG Cap 7/7/2021 Active   guaiFENesin (ROBITUSSIN) 100 MG/5ML Solution 6/29/2021 Active   Guaifenesin 400 MG Tab 7/7/2021 Active   insulin glargine (LANTUS SOLOSTAR) 100 UNIT/ML Solution Pen-injector injection 7/6/2021 Active   ipratropium-albuterol (DUONEB) 0.5-2.5 (3) MG/3ML nebulizer solution 7/7/2021 Active   levoFLOXacin (LEVAQUIN) 250 MG Tab 6/16/2021 Active   methimazole (TAPAZOLE) 5 MG Tab 7/7/2021 Active   midodrine (PROAMATINE) 10 MG tablet 7/7/2021 Active   omeprazole (PRILOSEC) 20 MG delayed-release capsule 7/7/2021 Active   oxyCODONE-acetaminophen (PERCOCET) 5-325 MG Tab 7/5/2021 Active   sevelamer carbonate (RENVELA) 800 MG Tab tablet 7/7/2021 Active   tamsulosin (FLOMAX) 0.4 MG capsule 7/6/2021 Active   traMADol (ULTRAM) 50 MG Tab  "2021 Active   traZODone (DESYREL) 50 MG Tab 2021 Active                  ALLERGIES  Allergies   Allergen Reactions    Mircera [Methoxy Polyethylene Glycol-Epoetin Beta] Hives    Other Drug Unspecified     \"Opioids\" caused hallucinations     PHYSICAL EXAM  VITAL SIGNS: /55   Pulse 78   Temp 36.7 °C (98 °F) (Temporal)   Resp 19   Ht 1.676 m (5' 6\")   Wt 72.6 kg (160 lb)   SpO2 95%   BMI 25.82 kg/m²    Gen: Alert, no acute distress, ill appearing   HEENT: ATNC  Eyes: PERRL, EOMI, normal conjunctiva.   Neck: trachea midline  Resp: no respiratory distress, wet cough, crackles in all lung fields   CV: No JVD, RRR  Abd: non-distended, non-tender  Ext: Bilateral BKAs. Pitting edema to left lower extremity. Trace pitting edema to right lower extremity. Eschar to the left thumb, index, and middle fingers.   Psych: normal mood  Neuro: speech fluent     DIAGNOSTIC STUDIES / PROCEDURES     EKG  Results for orders placed or performed during the hospital encounter of 21   EKG   Result Value Ref Range    Report       [facility] Emergency Dept.    Test Date:  2021  Pt Name:    KLARISSA BRADSHAW              Department: ER  MRN:        [id number]                      Room:        14  Gender:     Male                         Technician: [id number]  :        [id number]                   Requested By:ER TRIAGE PROTOCOL  Order #:    [id number]                    Reading MD: Christoph Mckinley    Measurements  Intervals                                Axis  Rate:       79                           P:          49  IL:         216                          QRS:        -68  QRSD:       136                          T:          113  QT:         416  QTc:        477    Interpretive Statements  SINUS RHYTHM  VENTRICULAR TRIGEMINY  BORDERLINE AV CONDUCTION DELAY  RBBB AND LAFB  Compared to ECG 2021 13:53:44  No significant changes  Electronically Signed On 2021 20:27:45 PDT by Christoph Mckinley      "   LABS  Labs Reviewed   CBC WITH DIFFERENTIAL - Abnormal; Notable for the following components:       Result Value    RBC 2.58 (*)     Hemoglobin 8.2 (*)     Hematocrit 26.6 (*)     .1 (*)     MCHC 30.8 (*)     RDW 56.0 (*)     Neutrophils-Polys 74.10 (*)     Lymphocytes 14.10 (*)     All other components within normal limits   COMP METABOLIC PANEL - Abnormal; Notable for the following components:    Bun 30 (*)     Creatinine 4.75 (*)     AST(SGOT) 10 (*)     Alkaline Phosphatase 147 (*)     Albumin 3.1 (*)     Total Protein 5.9 (*)     All other components within normal limits   ESTIMATED GFR - Abnormal; Notable for the following components:    GFR If  14 (*)     GFR If Non  12 (*)     All other components within normal limits   POCT GLUCOSE DEVICE RESULTS - Abnormal; Notable for the following components:    Glucose - Accu-Ck 145 (*)     All other components within normal limits   FLUID GLUCOSE   FLUID TOTAL PROTEIN   FLUID CELL COUNT   FLUID PH   FLUID LDH   FLUID CULTURE W/GRAM STAIN   ANAEROBIC CULTURE   CBC WITH DIFFERENTIAL   COMP METABOLIC PANEL   PROBRAIN NATRIURETIC PEPTIDE, NT   PROTHROMBIN TIME   APTT   LDH     All labs reviewed by me.    RADIOLOGY  DX-CHEST-PORTABLE (1 VIEW)   Final Result      1.  Moderate to large right pleural effusion with overlying atelectasis/consolidation.   2.  Interstitial prominence likely represents interstitial edema.   3.  Stable cardiomegaly.      US-THORACENTESIS PUNCTURE RIGHT    (Results Pending)     The radiologist’s interpretation of all radiology studies have been reviewed by me.    COURSE & MEDICAL DECISION MAKING  Pertinent Labs & Imaging studies reviewed. (See chart for details)    7:10 PM - Patient seen and examined at bedside.     8:27 PM - Patient will be treated with Lasix 40 mg injection for his symptoms.     8:31 PM - Patient reevaluated. Informed patient and his wife that there is a fluid collection in the lungs.  Discussed need for hospitalization. Patient is amenable to this plan. His nephrologist is Dr. Reyes.     8:57 PM - Paged Nephrology.    9:01 PM - I discussed the patient's case and the above findings with Dr. Anthony (Nephrologist) who will get the patient scheduled for dialysis in the morning.     9:01 PM - Paged Hospitalist.    9:03 PM - I discussed the patient's case and the above findings with Dr. Tomlinson (Hospitalist) who will evaluate the patient for hospitalization.     Medical Decision Making:  Patient presents with worsening shortness of breath.  He appears to be fluid overloaded on exam with pulmonary edema.  Chest x-ray demonstrates recurrent pleural effusion.  Nephrology consulted, who will arrange for dialysis.  Patient may require repeat thoracentesis.    I wore a mask and eye protection throughout the encounter.    DISPOSITION:  Patient will be hospitalized by Dr. Tomlinson in guarded condition.    FINAL IMPRESSION  1. Hypervolemia, unspecified hypervolemia type    2. Shortness of breath      ICarla ([place]), am scribing for, and in the presence of, Christoph Mckinley M.D..    Electronically signed by: Carla Echevarria ([place]), 7/7/2021    IChristoph M.D. personally performed the services described in this documentation, as scribed by Carla Echevarria in my presence, and it is both accurate and complete.    The note accurately reflects work and decisions made by me.  Christoph Mckinley M.D.  7/8/2021  3:43 AM    C.     This dictation was created using voice recognition software. The accuracy of the dictation is limited to the abilities of the software. I expect there may be some errors of grammar and possibly content. The nursing notes were reviewed and certain aspects of this information were incorporated into this note.

## 2021-07-08 NOTE — ED NOTES
Med rec complete per Continuity of Care Document sent with Pt from [facility].  Allergies reviewed per Continuity of Care Document.  From 6/10/2021 - 6/16/2021 Pt was on a 7-day course of levofloxacin 250 mg 1 tablet each day at bedtime (FINISHED).  Pt takes ELIQUIS and PLAVIX.

## 2021-07-08 NOTE — PROGRESS NOTES
4 Eyes Skin Assessment Completed by JAN Segovia and JAN Theodore.    Head WDL  Ears Blanching  Nose WDL  Mouth WDL  Neck Blanching  Breast/Chest WDL  Shoulder Blades WDL  Spine Blanching  (R) Arm/Elbow/Hand Blanching, Bruising  (L) Arm/Elbow/Hand Redness and Bruising, necrotic fingers, purple/black, incision site from fistula removal inner arm.   Abdomen Blanching  Groin Blanching  Scrotum/Coccyx/Buttocks Redness and Blanching  (R) Leg Scar BKA  (L) Leg Incision Staples Redness BKA  (R) Heel/Foot/Toe Amputated  (L) Heel/Foot/Toe Amputated          Devices In Places Tele Box and Central Line      Interventions In Place Sacral Mepilex, Pillows and Pressure Redistribution Mattress    Possible Skin Injury No    Pictures Uploaded Into Epic Yes  Wound Consult Placed Yes  RN Wound Prevention Protocol Ordered Yes

## 2021-07-08 NOTE — CARE PLAN
Problem: Skin Integrity  Goal: Skin integrity is maintained or improved  Outcome: Progressing     Problem: Pain - Standard  Goal: Alleviation of pain or a reduction in pain to the patient’s comfort goal  Outcome: Progressing     Problem: Knowledge Deficit - Standard  Goal: Patient and family/care givers will demonstrate understanding of plan of care, disease process/condition, diagnostic tests and medications  Outcome: Progressing     Problem: Fall Risk  Goal: Patient will remain free from falls  Outcome: Progressing     The patient is Stable - Low risk of patient condition declining or worsening

## 2021-07-08 NOTE — PROGRESS NOTES
Patient with FSBG at 61, 4 oz juice and lunch given per protocol. Recheck FSBG 88. Will continue to monitor closely.

## 2021-07-08 NOTE — PROGRESS NOTES
[facility] Medicine Daily Progress Note    Date of Service  7/8/2021    Chief Complaint  Luis Sepulveda is a 75 y.o. male admitted 7/7/2021 with sob and cough.     Hospital Course  75 y.o. male with past medical history of coronary artery disease with stent, systolic heart failure with LVEF 30%, diabetes mellitus, end-stage renal disease on hemodialysis Tuesday Thursday Saturday essential hypertension, atrial fibrillation on eliquis, peripheral vascular disease with history of bilateral BKA follow-up with Dr. Nguyen who presented 7/7/2021 with shortness of breath and cough.    He use 4 L of oxygen home since he was discharged from the hospital a few weeks ago.  He continued to cough with phlegm production.  Denies any fever and chills.    The patient has a left failed fistular and eschar to three digits that per patient, are improving.    In the ER he was found to have right-sided pleural effusion and volume overload.    Nephrology was consulted  Thoracentesis.      Interval Problem Update  Reports sob and cough  Denies fever, chills, chest pain, nausea, vomiting, abdominal pain   Await HD and thoracentesis    I have personally seen and examined the patient at bedside. I discussed the plan of care with patient, bedside RN, charge RN,  and pharmacy.    Consultants/Specialty  Nephrology    Code Status  Full Code    Disposition  Patient is not medically cleared.   Anticipate discharge to to home with close outpatient follow-up.  I have placed the appropriate orders for post-discharge needs.    Review of Systems  Review of Systems   Respiratory: Positive for cough, sputum production and shortness of breath.    All other systems reviewed and are negative.       Physical Exam  Temp:  [36.1 °C (97 °F)-37.1 °C (98.8 °F)] 36.8 °C (98.2 °F)  Pulse:  [78-91] 80  Resp:  [16-20] 16  BP: ()/(42-55) 108/42  SpO2:  [93 %-100 %] 100 %    Physical Exam  Vitals and nursing note reviewed.   Constitutional:        General: He is not in acute distress.     Appearance: He is not toxic-appearing.   HENT:      Head: Normocephalic and atraumatic.      Mouth/Throat:      Pharynx: Oropharynx is clear.   Eyes:      Pupils: Pupils are equal, round, and reactive to light.   Cardiovascular:      Rate and Rhythm: Normal rate and regular rhythm.      Comments: HD catheter noted on L upper chest  Pulmonary:      Effort: Pulmonary effort is normal.      Breath sounds: Rales present.      Comments: Diminished breath sounds b/l, worse on the R  Abdominal:      General: Abdomen is flat. Bowel sounds are normal. There is no distension.      Palpations: Abdomen is soft.      Tenderness: There is no abdominal tenderness.   Musculoskeletal:         General: Normal range of motion.      Comments: BLE BKA  Eschar to the left thumb, index, and middle fingers.    Skin:     General: Skin is warm and dry.   Neurological:      General: No focal deficit present.      Mental Status: He is alert and oriented to person, place, and time.   Psychiatric:         Mood and Affect: Mood normal.         Behavior: Behavior normal.         Fluids  No intake or output data in the 24 hours ending 07/08/21 1129    Laboratory  Recent Labs     07/07/21  1823 07/08/21 0319   WBC 7.9 7.4   RBC 2.58* 2.67*   HEMOGLOBIN 8.2* 8.4*   HEMATOCRIT 26.6* 28.2*   .1* 105.6*   MCH 31.8 31.5   MCHC 30.8* 29.8*   RDW 56.0* 57.0*   PLATELETCT 305 307   MPV 9.2 9.5     Recent Labs     07/07/21  1823 07/08/21  0319   SODIUM 137 132*   POTASSIUM 4.7 4.3   CHLORIDE 96 93*   CO2 30 28   GLUCOSE 73 120*   BUN 30* 37*   CREATININE 4.75* 5.03*   CALCIUM 9.3 9.1     Recent Labs     07/08/21 0319   APTT 43.7*   INR 1.64*               Imaging  DX-CHEST-PORTABLE (1 VIEW)   Final Result      1.  Moderate to large right pleural effusion with overlying atelectasis/consolidation.   2.  Interstitial prominence likely represents interstitial edema.   3.  Stable cardiomegaly.       US-THORACENTESIS PUNCTURE RIGHT    (Results Pending)        Assessment/Plan  * Acute on chronic respiratory failure (HCC)  Assessment & Plan  Likely sec to R pleural effusion and fluid overload  Thoracentesis ordered  Nephrology consulted for HD  Duonecarmen, RT protocol  Currently at 4L O2, which is his baseline  Cont monitoring respiratory status    Pleural effusion- (present on admission)  Assessment & Plan  Right side of mod to large pleural effusion  Ordered us thoracentesis  To hold eliquis    Acute on chronic systolic congestive heart failure (HCC)- (present on admission)  Assessment & Plan  LVEF 30%  On iv lasix 40 mg bid  Lisinopril, carvedilol  2 g salt  Adjust meds as needed    Peripheral vascular disease (HCC)- (present on admission)  Assessment & Plan  Hx of PAD with escar noted on L thumb, index and middle fingers, followed by vascular Dr. Nguyen. Per patient, the swelling and wound have been improving.     End stage renal disease on dialysis ([facility])- (present on admission)  Assessment & Plan  Nephrology is consulted, plans HD    Type 2 diabetes mellitus with kidney complication, with long-term current use of insulin ([facility])- (present on admission)  Assessment & Plan  On SSI and hypoglycemic protocol  A1c 5.7 2 months ago    Status post below-knee amputation of both lower extremities ([facility])  Assessment & Plan  S/p BKA b/l    DNR (do not resuscitate)- (present on admission)  Assessment & Plan  Per POLST: patient is DNR/DNI    Paroxysmal atrial fibrillation ([facility])- (present on admission)  Assessment & Plan  Rate controlled  On carvedilol, eliquis: holding for thoracentesis    CAD (coronary artery disease)- (present on admission)  Assessment & Plan  History of  Continue outpatient meds: plavix, statin, coreg, lisinopril       VTE prophylaxis: Eliquis on hold due to thoracentesis

## 2021-07-08 NOTE — ASSESSMENT & PLAN NOTE
Rate control and monitor on telemetry  Holding coreg due to hypotension and currently on midodrine  Cont eliquis

## 2021-07-08 NOTE — DISCHARGE PLANNING
Outpatient Dialysis Note    Confirmed patient is active at:    [facility]   [address]  [place] [address]    Schedule: Tuesday, Thursday, Saturday   Time: 6:00am    Patient is seen by Dr. Hunter in HD clinic.    Spoke with Clayton at facility who confirmed.    Forwarded records for review.    Becca Bojorquez- Dialysis Coordinator # [phone]  [facility]

## 2021-07-08 NOTE — PROGRESS NOTES
Patient brought up from the ED on the [facility] by ACLS certified JAN Segovia. Patient is on 4 liters of oxygen via NC. Patient is A&OX4. He is complaining of 7/10 pain in his left hand. Administered 50mg Tramadol. Patient has 2 BKA and is bed bound. Will assess patient and continue to monitor.

## 2021-07-08 NOTE — ASSESSMENT & PLAN NOTE
Likely sec to R pleural effusion and fluid overload  S/p Thoracentesis w/ 2L removed  Nephrology consulting for HD  Duoneb, RT protocol  On 5L NC, titrate as tolerable  Typically on 4L O2 baseline  Pending SNF

## 2021-07-08 NOTE — ASSESSMENT & PLAN NOTE
LVEF 30%  Holding Lisinopril, carvedilol, lasix  2 g salt, fluid restriction   Midodrine for hypotension  Nephrology following for HD  Adjust meds as needed  Pending SNF

## 2021-07-08 NOTE — HEART FAILURE PROGRAM
Patient brought to our facility in the early morning hours yesterday from [facility] for shortness of breath.     Patient was just seen at the [facility] on 7/2/21 for f/u on his known heart failure with reduced ejection fraction (HFrEF).     Dr. Amin did note at the time that patient is chronically short of breath because his fluid removal during dialysis is limited by his steal syndrome and that dialysis needs to be optimized.    This is patient's 4th admission this year. He does have a POLST on file for DNR but is currently listed as a full code, I've messaged Dr. Villatoro.    Nursing: please complete the HF Discharge Checklist (pink sheet in hard chart) and have it cosigned by your charge RN before patient leaves the hospital.    Providers: below are all Guideline Directed Medical Therapy (GDMT) indicated for HFrEF. If any can not be prescribed by discharge, please note the clinical reason for each in your discharge summary.    QUICK SUMMARY OF HFrEF MEASURES    -- Evidence Based Beta Blocker (bisoprolol, carvedilol, or toprol xl), for EF of 40% or less  -- YOMI - I, for EF of 40% or less  -- Aldosterone antagonist, for EF of 35% or less  -- Anticoagulation for atrial arrhythmia, if applicable  -- Glycemic control for DM + HF, if diabetic  -- Lipid lowering medication for DM + HF, if diabetic  -- Hydralazine dinitrate, if pt self identifies as   -- Pneumococcal vaccine if not previously received  -- Influenza vaccine if not previously received for the current flu season  -- Smoking cessation counseling documented if applicable  -- Device screening if applicable    Daily Nurse: please begin to fill out the HF checklist (pink sheet in hard chart) and use it to guide your daily care.    Discharge Nurse: please ensure completeness of the HF checklist (pink sheet in hard chart) and have it co-signed by the charge RN before the patient leaves the hospital.    Thank you, Madison, Cardio RN Navigator  [phone]      DETAILED EXPLANATION OF HF MEASURES:  1. Documentation of LV systolic function (echo or cath) PTA, during this hospitalization, or plan to assess post discharge or reason for not assessing documented  2. Documentation of fluid intake and urine output every nursing shift  3. 2 hour post diuretic assessment documented 2 hours after diuretic given  4. HF Patient Education using the Living Well With Heart Failure Booklet and Symptom Tracker documented every nursing shift  5. Nutrition consult for diet education  6. Daily weights (one weight documented every 24 hours) on a standing scale unless standing is contraindicated in which case bed scale can be used - have patient write weight on symptom tracker  7. For LVEF less than or equal to 40%, ACE-I, ARNI or ARB prescribed at discharge   8. For LVEF less than or equal to 40%, an Evidence Based Beta Blocker (bisoprolol, carvedilol, toprol xl) must be prescribed at discharge  9. For LVEF less than or equal to 35% aldosterone blockade prescribed at discharge  10. The combination of hydralazine and isosorbide dinitrate is recommended to reduce morbidity and mortality for patients self-described  Americans with NYHA class III-IV HFrEF (EF 40% or less), receiving optimal therapy with ACE inhibitors and beta blockers, unless contraindicated (Class I, RADHA: A).  11. If a HF patient is diabetic or is newly diagnosed with DM: prescribed diabetes treatment at discharge in the form of glycemic control (diet or anti-hyperglycemic medication) or f/u appointment for diabetes management scheduled at discharge.  12. If a HF patient has diabetes: prescribed lipid lowering medication at discharge  13. Documented smoking cessation advice or counseling  14. If a HF patient has a-fib: anticoagulation is prescribed upon discharge or contraindication is documented  15. Screening for and administering immunizations as long as no contraindications: Pneumonia (regardless of age)  and Influenza  16. Written discharge instructions include:  ? Daily weights  ? Record weight on tracker  ? Bring tracker to appointments  ? Call MD for weight gain of 3lb /day or 5lb/week  ? HF medication teaching  ? Low sodium diet  ? Follow up appointment within seven calendar days of d/c must include: date, time and location  ? Activity  ? Worsening symptoms    What if any of the above HF measures are contraindicated?  ? Request that the discharging provider document the medication/intervention and the contraindication specifically in a progress note  ? For example: “no CHF meds due to hypotension” is not enough. It needs to say: “No ACE-I, ARNI, ARB due to hypotension”; “No Beta Blockade due to bradycardia”…

## 2021-07-09 NOTE — ASSESSMENT & PLAN NOTE
Acute respiratory insufficiency - resolved.   Titrate supplemental O2 to maintain O2 saturation > 92%  Aggressive pulmonary hygiene  Encourage incentive spirometry

## 2021-07-09 NOTE — CARE PLAN
The patient is Watcher - Medium risk of patient condition declining or worsening         Problem: Skin Integrity  Goal: Skin integrity is maintained or improved  Outcome: Progressing  Note: Patient has increased risk for impaired skin integrity and pressure ulcers, precautions in place to prevent new skin injury. patient will remain free from new skin injury's during shift.       Problem: Pain - Standard  Goal: Alleviation of pain or a reduction in pain to the patient’s comfort goal  Outcome: Progressing  Note: Patient will verbalize acceptable level of pain relief on a scale of 0 to 10,  along with their ability to engage in desired activies.

## 2021-07-09 NOTE — PROGRESS NOTES
"Pharmacy Vancomycin Kinetics Note for 7/9/2021     75 y.o. male on Vancomycin day # 1     Vancomycin Indication (Two level/Trough based Dosing): Sepsis (goal trough 15-20)    Provider specified end date: 07/14/21    Active Antibiotics (From admission, onward)    Ordered     Ordering Provider       Fri Jul 9, 2021 11:00 AM    07/09/21 1100  vancomycin (VANCOCIN) 2,000 mg in  mL IVPB  (vancomycin (VANCOCIN) IV (LD + Maintenance))  ONCE     Note to Pharmacy: Per P&T Kinetics Protocol    Waylon Smith M.D.       Fri Jul 9, 2021 10:48 AM    07/09/21 1048  MD Alert...Vancomycin per Pharmacy  PHARMACY TO DOSE     Question:  Indication(s) for vancomycin?  Answer:  Unknown source of infection    Waylon Smith M.D.    07/09/21 1048  piperacillin-tazobactam (ZOSYN) 4.5 g in  mL IVPB  (piperacillin-tazobactam (ZOSYN) IV (Extended-infusion) PANEL )  EVERY 12 HOURS      Waylon Smith M.D.          Dosing Weight: 75.8 kg (167 lb 1.7 oz)      Admission History: Admitted on 7/7/2021 for Pleural effusion [J90]  Pertinent history: Admitted for SOB, found to have pleural effusion as well as warm, tender BKA sites.  Concern for possible sepsis as cause of hypotension.    Allergies:     Mircera [methoxy polyethylene glycol-epoetin beta] and Other drug     Pertinent cultures to date:     Results     Procedure Component Value Units Date/Time    BLOOD CULTURE [[id number]] Collected: 07/09/21 1100    Order Status: Completed Specimen: Blood from Peripheral Updated: 07/09/21 1325    Narrative:      Collected By:[id number] SUJATHA LEYVA  Per Hospital Policy: Only change Specimen Src: to \"Line\" if  specified by physician order.    BLOOD CULTURE [[id number]] Collected: 07/09/21 1100    Order Status: Completed Specimen: Blood from Peripheral Updated: 07/09/21 1319    Narrative:      Collected By:[id number] SUJATHA LEYVA  Per Hospital Policy: Only change Specimen Src: to \"Line\" if  specified by physician order.    " "Fluid Culture W/Gram Stain (pleural fluid) [[id number]] Collected: 21 1153    Order Status: Completed Specimen: Body Fluid from Pleural Fluid Updated: 21 130     Significant Indicator NEG     Source BF     Site PLEURAL FLUID     Culture Result No growth at 24 hours.     Gram Stain Result Rare WBCs.  No organisms seen.      Anaerobic Culture (pleural fluid) [[id number]] Collected: 21 1153    Order Status: Completed Specimen: Body Fluid from Pleural Fluid Updated: 21 130     Significant Indicator NEG     Source BF     Site PLEURAL FLUID     Culture Result Culture in progress.    URINALYSIS [[id number]]     Order Status: No result Specimen: Urine     GRAM STAIN [[id number]] Collected: 21    Order Status: Completed Specimen: Body Fluid Updated: 21     Significant Indicator .     Source BF     Site PLEURAL FLUID     Gram Stain Result Rare WBCs.  No organisms seen.      Fluid Culture W/Gram Stain (pleural fluid) [[id number]]     Order Status: Canceled Specimen: Body Fluid from Pleural Fluid     Anaerobic Culture (pleural fluid) [[id number]]     Order Status: Canceled Specimen: Body Fluid from Pleural Fluid           Labs:     Estimated Creatinine Clearance: 16.3 mL/min (A) (by C-G formula based on SCr of 3.43 mg/dL (H)).  Recent Labs     21  0203   WBC 7.9 7.4 7.2   NEUTSPOLYS 74.10* 72.30* 75.60*     Recent Labs     21  0203   BUN 30* 37* 21   CREATININE 4.75* 5.03* 3.43*   ALBUMIN 3.1* 2.8*  --        Intake/Output Summary (Last 24 hours) at 2021 1640  Last data filed at 2021 1400  Gross per 24 hour   Intake 1285.74 ml   Output 1500 ml   Net -214.26 ml      BP (!) 86/22   Pulse 74   Temp 36.2 °C (97.1 °F) (Temporal)   Resp 13   Ht 1.676 m (5' 6\")   Wt 75.8 kg (167 lb 1.7 oz)   SpO2 97%  Temp (24hrs), Av.6 °C (97.8 °F), Min:36.1 °C (96.9 °F), Max:37.4 °C (99.3 °F)      List concerns for " Vancomycin clearance:     Age;ESRD;Pressors/Hypotension;Nephrotoxic drugs     Pharmacokinetics:     Trough kinetics:   No results for input(s): VANCOTROUGH, VANCOPEAK, VANCORANDOM in the last 72 hours.    A/P:     -  Vancomycin dose: loading dose today - 2000 mg x 1 dose    -  Next vancomycin level(s):    -7/11 Vt @ 0500     -  Comments: New start vancomycin for sepsis of unknown etiology, multiple possible sources.  Known HD.  Plan to proceed with trough based dosing using pulse dosing.  Trough ordered for 7/11 with AM labs.    Sujata Palmer, PharmD, BCCCP

## 2021-07-09 NOTE — ASSESSMENT & PLAN NOTE
Unclear etiology  Titrate norepinephrine via PIV to maintain MAP > 60 or SBP > 90  Continue midodrine  D/C antihypertensives

## 2021-07-09 NOTE — ASSESSMENT & PLAN NOTE
Continue iHD per schedule. Tuesday, Thursday and Saturday.   Monitor electrolytes and replete as needed

## 2021-07-09 NOTE — CONSULTS
Critical Care Consultation    Date of consult: 7/9/2021    Referring Physician  Lucretia Villatoro M.D.    Reason for Consultation  Hypotension    History of Presenting Illness  75 y.o. male w/ ESRD, CHF, DM, who presented 7/7/2021 with shortness of breath and underwent a large volume thoracentesis and became hypotensive early this morning associated with some hallucinations.  He was given 500 mL crystalloid bolus and due to increasing rales and O2 requirement no further fluid was given.    I was called for further evaluation and treatment.    Code Status  DNAR/DNI    Review of Systems  Review of Systems   Constitutional: Positive for malaise/fatigue.   Respiratory: Positive for shortness of breath.    Cardiovascular: Negative for chest pain and leg swelling.   Gastrointestinal: Negative for abdominal pain.   Musculoskeletal: Negative for back pain.   Skin: Negative for itching.   Neurological: Negative for headaches.   All other systems reviewed and are negative.      Past Medical History   has a past medical history of Arrhythmia, Arthritis (12/29/2020), CAD (coronary artery disease), Chronic anticoagulation (3/26/2020), Chronic kidney disease (CKD), stage V ([facility]), Congestive heart failure (HCC), Dental disorder (12/29/2020), Diabetes, Hemodialysis patient (HCC), Hypertension, Kidney failure, Myocardial infarct (HCC), Osteoarthritis, Peripheral neuropathy, Pneumonia, and Sleep apnea.    Surgical History   has a past surgical history that includes appendectomy; other orthopedic surgery; other cardiac surgery; toe amputation (Left, 5/17/2020); tenotomy (Left, 5/17/2020); toe amputation (Right, 11/16/2020); knee amputation below (Right, 12/31/2020); knee amputation below (Left, 5/28/2021); av fistula revision (Left, 6/23/2021); vein ligation (Left, 6/23/2021); and cath placement (Left, 6/23/2021).    Family History  family history includes Alcohol/Drug in his father; Diabetes in his brother; Heart Disease in his mother;  Thyroid in his son.    Social History   reports that he quit smoking about 7 years ago. His smoking use included cigarettes. He has a 55.00 pack-year smoking history. He has never used smokeless tobacco. He reports previous alcohol use. He reports that he does not use drugs.    Medications  Home Medications     Reviewed by Sandra Ruiz (Pharmacy Tech) on 07/07/21 at 2055  Med List Status: Complete   Medication Last Dose Status   acetaminophen (TYLENOL) 325 MG Tab 7/7/2021 Active   apixaban (ELIQUIS) 5mg Tab 7/7/2021 Active   atorvastatin (LIPITOR) 40 MG Tab 7/6/2021 Active   budesonide (PULMICORT) 0.5 MG/2ML Suspension  Active   calcitRIOL (ROCALTROL) 0.25 MCG Cap 7/7/2021 Active   carvedilol (COREG) 3.125 MG Tab 7/7/2021 Active   clopidogrel (PLAVIX) 75 MG Tab 7/7/2021 Active   furosemide (LASIX) 40 MG Tab 7/7/2021 Active   gabapentin (NEURONTIN) 300 MG Cap 7/7/2021 Active   guaiFENesin (ROBITUSSIN) 100 MG/5ML Solution 6/29/2021 Active   Guaifenesin 400 MG Tab 7/7/2021 Active   insulin glargine (LANTUS SOLOSTAR) 100 UNIT/ML Solution Pen-injector injection 7/6/2021 Active   ipratropium-albuterol (DUONEB) 0.5-2.5 (3) MG/3ML nebulizer solution 7/7/2021 Active   levoFLOXacin (LEVAQUIN) 250 MG Tab 6/16/2021 Active   methimazole (TAPAZOLE) 5 MG Tab 7/7/2021 Active   midodrine (PROAMATINE) 10 MG tablet 7/7/2021 Active   omeprazole (PRILOSEC) 20 MG delayed-release capsule 7/7/2021 Active   oxyCODONE-acetaminophen (PERCOCET) 5-325 MG Tab 7/5/2021 Active   sevelamer carbonate (RENVELA) 800 MG Tab tablet 7/7/2021 Active   tamsulosin (FLOMAX) 0.4 MG capsule 7/6/2021 Active   traMADol (ULTRAM) 50 MG Tab 7/4/2021 Active   traZODone (DESYREL) 50 MG Tab 7/6/2021 Active              Current Facility-Administered Medications   Medication Dose Route Frequency Provider Last Rate Last Admin   • NS infusion   Intravenous Continuous Tevin Honeycutt M.D.       • norepinephrine (Levophed) 8 mg in 250 mL NS infusion (premix)  0-30  mcg/min Intravenous Continuous Glen Berkowitz M.D.       • epoetin (Retacrit) injection (Dialysis Use Only) 4,000 Units  4,000 Units Subcutaneous TUE+THU+SAT Kulwant Hodges M.D.   4,000 Units at 07/08/21 1611   • heparin injection 2,000 Units  2,000 Units Intravenous DIALYSIS PRN Kulwant Hodges M.D.   2,000 Units at 07/08/21 1536   • heparin injection 3,700 Units  3,700 Units Intracatheter DIALYSIS PRN Kulwant Hodges M.D.   3,700 Units at 07/08/21 1840   • gabapentin (NEURONTIN) capsule 300 mg  300 mg Oral TID James Mott D.OTrey   300 mg at 07/08/21 2100   • atorvastatin (LIPITOR) tablet 40 mg  40 mg Oral QHS Fortino Tomlinson M.D.   40 mg at 07/08/21 2028   • calcitRIOL (ROCALTROL) capsule 0.25 mcg  0.25 mcg Oral DAILY Fortino Tomlinson M.D.   0.25 mcg at 07/08/21 0519   • clopidogrel (PLAVIX) tablet 75 mg  75 mg Oral DAILY Fortino Tomlinson M.D.   75 mg at 07/08/21 0520   • insulin glargine (Semglee) injection  10 Units Subcutaneous QHS Fortino Tomlinson M.D.   10 Units at 07/08/21 2103   • ipratropium-albuterol (DUONEB) nebulizer solution  3 mL Nebulization BID Fortino Tomlinson M.D.   3 mL at 07/08/21 2226   • methimazole (TAPAZOLE) tablet 5 mg  5 mg Oral BID Fortino Tomlinson M.D.   5 mg at 07/08/21 1937   • midodrine (PROAMATINE) tablet 10 mg  10 mg Oral TID WITH MEALS Fortino Tomlinson M.D.   10 mg at 07/08/21 1937   • omeprazole (PRILOSEC) capsule 20 mg  20 mg Oral BID Fortino Tomlinson M.D.   20 mg at 07/08/21 1937   • oxyCODONE-acetaminophen (PERCOCET) 5-325 MG per tablet 1 tablet  1 tablet Oral Q4HRS PRN Fortino Tomlinson M.D.   1 tablet at 07/08/21 1937   • sevelamer carbonate (RENVELA) tablet 1,600 mg  1,600 mg Oral TID WITH MEALS Fortino Tomlinson M.D.   1,600 mg at 07/08/21 1937   • tamsulosin (FLOMAX) capsule 0.8 mg  0.8 mg Oral QHS Fortino Tomlinson M.D.   0.8 mg at 07/08/21 2028   • traMADol (ULTRAM) 50 MG tablet 50 mg  50 mg Oral Q6HRS PRN Fortino Tomlinson M.D.   50 mg at 07/08/21 2059   • traZODone (DESYREL) tablet 50 mg  50 mg Oral QHS Fortino Tomlinson  "M.D.   50 mg at 07/08/21 2028   • senna-docusate (PERICOLACE or SENOKOT S) 8.6-50 MG per tablet 2 tablet  2 tablet Oral BID Fortino Tomlinson M.D.        And   • polyethylene glycol/lytes (MIRALAX) PACKET 1 Packet  1 Packet Oral QDAY PRN Fortino Tomlinson M.D.        And   • bisacodyl (DULCOLAX) suppository 10 mg  10 mg Rectal QDAY PRN Fortino Tomlinson M.D.       • guaiFENesin dextromethorphan (ROBITUSSIN DM) 100-10 MG/5ML syrup 10 mL  10 mL Oral Q6HRS PRN Fortino Tomlinson M.D.       • ondansetron (ZOFRAN) syringe/vial injection 4 mg  4 mg Intravenous Q4HRS PRN Fortino Tomlinson M.D.       • ondansetron (ZOFRAN ODT) dispertab 4 mg  4 mg Oral Q4HRS PRN Fortino Tomlinson M.D.       • insulin regular (HumuLIN R,NovoLIN R) injection  1-6 Units Subcutaneous 4X/DAY ACHS Fortino Tomlinson M.D.        And   • glucose 4 g chewable tablet 16 g  16 g Oral Q15 MIN PRN Fortino Tomlinson M.D.        And   • dextrose 50% (D50W) injection 50 mL  50 mL Intravenous Q15 MIN PRN Fortino Tomlinson M.D.       • furosemide (LASIX) injection 40 mg  40 mg Intravenous BID DIURETIC Fortino Tomlinson M.D.   40 mg at 07/08/21 0519       Allergies  Allergies   Allergen Reactions   • Mircera [Methoxy Polyethylene Glycol-Epoetin Beta] Hives   • Other Drug Unspecified     \"Opioids\" caused hallucinations       Vital Signs last 24 hours  Temp:  [36.1 °C (96.9 °F)-37.4 °C (99.3 °F)] 36.9 °C (98.4 °F)  Pulse:  [51-99] 99  Resp:  [16-19] 16  BP: ()/(19-84) 92/41  SpO2:  [90 %-100 %] 97 %    Physical Exam  Physical Exam  Vitals and nursing note reviewed. Exam conducted with a chaperone present.   Constitutional:       General: He is not in acute distress.     Appearance: He is ill-appearing. He is not toxic-appearing.   HENT:      Head: Normocephalic and atraumatic.      Right Ear: External ear normal.      Left Ear: External ear normal.      Nose: Nose normal.      Mouth/Throat:      Mouth: Mucous membranes are moist.      Pharynx: Oropharynx is clear.   Eyes:      Extraocular Movements: Extraocular " movements intact.      Pupils: Pupils are equal, round, and reactive to light.   Cardiovascular:      Rate and Rhythm: Normal rate and regular rhythm.      Heart sounds: Normal heart sounds.   Pulmonary:      Effort: No respiratory distress.      Breath sounds: Rales present.   Abdominal:      General: There is no distension.      Palpations: Abdomen is soft.   Musculoskeletal:         General: Normal range of motion.      Cervical back: Normal range of motion and neck supple.      Comments: Bilateral BKA   Skin:     General: Skin is warm and dry.      Comments: Left hand with multiple necrotic fingers which patient states is secondary to a failed left upper arm fistula   Neurological:      General: No focal deficit present.      Mental Status: He is alert and oriented to person, place, and time.   Psychiatric:         Mood and Affect: Mood normal.         Fluids    Intake/Output Summary (Last 24 hours) at 7/9/2021 0525  Last data filed at 7/8/2021 1845  Gross per 24 hour   Intake 500 ml   Output 1500 ml   Net -1000 ml       Laboratory  Recent Results (from the past 48 hour(s))   CBC with Differential    Collection Time: 07/07/21  6:23 PM   Result Value Ref Range    WBC 7.9 4.8 - 10.8 K/uL    RBC 2.58 (L) 4.70 - 6.10 M/uL    Hemoglobin 8.2 (L) 14.0 - 18.0 g/dL    Hematocrit 26.6 (L) 42.0 - 52.0 %    .1 (H) 81.4 - 97.8 fL    MCH 31.8 27.0 - 33.0 pg    MCHC 30.8 (L) 33.7 - 35.3 g/dL    RDW 56.0 (H) 35.9 - 50.0 fL    Platelet Count 305 164 - 446 K/uL    MPV 9.2 9.0 - 12.9 fL    Neutrophils-Polys 74.10 (H) 44.00 - 72.00 %    Lymphocytes 14.10 (L) 22.00 - 41.00 %    Monocytes 10.50 0.00 - 13.40 %    Eosinophils 0.00 0.00 - 6.90 %    Basophils 0.50 0.00 - 1.80 %    Immature Granulocytes 0.80 0.00 - 0.90 %    Nucleated RBC 0.00 /100 WBC    Neutrophils (Absolute) 5.87 1.82 - 7.42 K/uL    Lymphs (Absolute) 1.12 1.00 - 4.80 K/uL    Monos (Absolute) 0.83 0.00 - 0.85 K/uL    Eos (Absolute) 0.00 0.00 - 0.51 K/uL    Baso  (Absolute) 0.04 0.00 - 0.12 K/uL    Immature Granulocytes (abs) 0.06 0.00 - 0.11 K/uL    NRBC (Absolute) 0.00 K/uL   Comp Metabolic Panel    Collection Time: 21  6:23 PM   Result Value Ref Range    Sodium 137 135 - 145 mmol/L    Potassium 4.7 3.6 - 5.5 mmol/L    Chloride 96 96 - 112 mmol/L    Co2 30 20 - 33 mmol/L    Anion Gap 11.0 7.0 - 16.0    Glucose 73 65 - 99 mg/dL    Bun 30 (H) 8 - 22 mg/dL    Creatinine 4.75 (H) 0.50 - 1.40 mg/dL    Calcium 9.3 8.5 - 10.5 mg/dL    AST(SGOT) 10 (L) 12 - 45 U/L    ALT(SGPT) <5 2 - 50 U/L    Alkaline Phosphatase 147 (H) 30 - 99 U/L    Total Bilirubin <0.2 0.1 - 1.5 mg/dL    Albumin 3.1 (L) 3.2 - 4.9 g/dL    Total Protein 5.9 (L) 6.0 - 8.2 g/dL    Globulin 2.8 1.9 - 3.5 g/dL    A-G Ratio 1.1 g/dL   ESTIMATED GFR    Collection Time: 21  6:23 PM   Result Value Ref Range    GFR If  14 (A) >60 mL/min/1.73 m 2    GFR If Non  12 (A) >60 mL/min/1.73 m 2   EKG    Collection Time: 21  7:31 PM   Result Value Ref Range    Report       [facility]    Test Date:  2021  Pt Name:    KLARISSA BRADSHAW              Department: ER  MRN:        [id number]                      Room:       [facility]  Gender:     Male                         Technician: [id number]  :        1945                   Requested By:ER TRIAGE PROTOCOL  Order #:    [id number]                    Jovan MD: Christoph Mckinley    Measurements  Intervals                                Axis  Rate:       79                           P:          49  CO:         216                          QRS:        -68  QRSD:       136                          T:          113  QT:         416  QTc:        477    Interpretive Statements  SINUS RHYTHM  VENTRICULAR TRIGEMINY  BORDERLINE AV CONDUCTION DELAY  RBBB AND LAFB  Compared to ECG 2021 13:53:44  No significant changes  Electronically Signed On 2021 20:27:45 PDT by Christoph Mckinley     POCT glucose device results     Collection Time: 07/07/21 11:38 PM   Result Value Ref Range    Glucose - Accu-Ck 145 (H) 65 - 99 mg/dL   CBC With Differential    Collection Time: 07/08/21  3:19 AM   Result Value Ref Range    WBC 7.4 4.8 - 10.8 K/uL    RBC 2.67 (L) 4.70 - 6.10 M/uL    Hemoglobin 8.4 (L) 14.0 - 18.0 g/dL    Hematocrit 28.2 (L) 42.0 - 52.0 %    .6 (H) 81.4 - 97.8 fL    MCH 31.5 27.0 - 33.0 pg    MCHC 29.8 (L) 33.7 - 35.3 g/dL    RDW 57.0 (H) 35.9 - 50.0 fL    Platelet Count 307 164 - 446 K/uL    MPV 9.5 9.0 - 12.9 fL    Neutrophils-Polys 72.30 (H) 44.00 - 72.00 %    Lymphocytes 16.30 (L) 22.00 - 41.00 %    Monocytes 10.00 0.00 - 13.40 %    Eosinophils 0.00 0.00 - 6.90 %    Basophils 0.50 0.00 - 1.80 %    Immature Granulocytes 0.90 0.00 - 0.90 %    Nucleated RBC 0.00 /100 WBC    Neutrophils (Absolute) 5.33 1.82 - 7.42 K/uL    Lymphs (Absolute) 1.20 1.00 - 4.80 K/uL    Monos (Absolute) 0.74 0.00 - 0.85 K/uL    Eos (Absolute) 0.00 0.00 - 0.51 K/uL    Baso (Absolute) 0.04 0.00 - 0.12 K/uL    Immature Granulocytes (abs) 0.07 0.00 - 0.11 K/uL    NRBC (Absolute) 0.00 K/uL   Comp Metabolic Panel (CMP)    Collection Time: 07/08/21  3:19 AM   Result Value Ref Range    Sodium 132 (L) 135 - 145 mmol/L    Potassium 4.3 3.6 - 5.5 mmol/L    Chloride 93 (L) 96 - 112 mmol/L    Co2 28 20 - 33 mmol/L    Anion Gap 11.0 7.0 - 16.0    Glucose 120 (H) 65 - 99 mg/dL    Bun 37 (H) 8 - 22 mg/dL    Creatinine 5.03 (H) 0.50 - 1.40 mg/dL    Calcium 9.1 8.5 - 10.5 mg/dL    AST(SGOT) 7 (L) 12 - 45 U/L    ALT(SGPT) <5 2 - 50 U/L    Alkaline Phosphatase 139 (H) 30 - 99 U/L    Total Bilirubin 0.2 0.1 - 1.5 mg/dL    Albumin 2.8 (L) 3.2 - 4.9 g/dL    Total Protein 5.6 (L) 6.0 - 8.2 g/dL    Globulin 2.8 1.9 - 3.5 g/dL    A-G Ratio 1.0 g/dL   proBrain Natriuretic Peptide, NT    Collection Time: 07/08/21  3:19 AM   Result Value Ref Range    NT-proBNP >46976 (H) 0 - 125 pg/mL   Prothrombin Time    Collection Time: 07/08/21  3:19 AM   Result Value Ref Range    PT  19.0 (H) 12.0 - 14.6 sec    INR 1.64 (H) 0.87 - 1.13   APTT    Collection Time: 07/08/21  3:19 AM   Result Value Ref Range    APTT 43.7 (H) 24.7 - 36.0 sec   LDH    Collection Time: 07/08/21  3:19 AM   Result Value Ref Range    LDH Total 188 107 - 266 U/L   ESTIMATED GFR    Collection Time: 07/08/21  3:19 AM   Result Value Ref Range    GFR If  14 (A) >60 mL/min/1.73 m 2    GFR If Non African American 11 (A) >60 mL/min/1.73 m 2   POCT glucose device results    Collection Time: 07/08/21  7:37 AM   Result Value Ref Range    Glucose - Accu-Ck 92 65 - 99 mg/dL   Fluid Cell Count (pleural fluid)    Collection Time: 07/08/21 11:53 AM   Result Value Ref Range    Fluid Type Pleural     Color-Body Fluid Yellow     Character-Body Fluid Clear     Total RBC Count <2000 cells/uL    Total  cells/uL    Polys 8 %    Lymphs 4 %    Mononuclear Cells - Fluid 1 %    Mesothelial Cells - CSF 2 %    Fluid Histiocyte 82 %    Eosinophils - CSF 2 %    Fluid Basophil 1 %    Comments see below    Fluid Total Protein (pleural fluid)    Collection Time: 07/08/21 11:53 AM   Result Value Ref Range    Fluid Type Pleural     Total Protein Fluid 3.4 g/dL   Fluid pH (pleural fluid)    Collection Time: 07/08/21 11:53 AM   Result Value Ref Range    Fluid Type Pleural     Ph Misc 8.00    Fluid LDH (pleural fluid)    Collection Time: 07/08/21 11:53 AM   Result Value Ref Range    Body Fluid LDH 90 U/L   Fluid Culture W/Gram Stain (pleural fluid)    Collection Time: 07/08/21 11:53 AM    Specimen: Pleural Fluid; Body Fluid   Result Value Ref Range    Significant Indicator NEG     Source BF     Site PLEURAL FLUID     Culture Result -     Gram Stain Result Rare WBCs.  No organisms seen.      Anaerobic Culture (pleural fluid)    Collection Time: 07/08/21 11:53 AM    Specimen: Pleural Fluid; Body Fluid   Result Value Ref Range    Significant Indicator NEG     Source BF     Site PLEURAL FLUID     Culture Result -    FLUID GLUCOSE     Collection Time: 21 11:53 AM   Result Value Ref Range    Glucose, Fluid 88 mg/dL   GRAM STAIN    Collection Time: 21 11:53 AM    Specimen: Body Fluid   Result Value Ref Range    Significant Indicator .     Source BF     Site PLEURAL FLUID     Gram Stain Result Rare WBCs.  No organisms seen.      POCT glucose device results    Collection Time: 21 12:42 PM   Result Value Ref Range    Glucose - Accu-Ck 61 (L) 65 - 99 mg/dL   POCT glucose device results    Collection Time: 21  1:16 PM   Result Value Ref Range    Glucose - Accu-Ck 88 65 - 99 mg/dL   POCT glucose device results    Collection Time: 21  7:32 PM   Result Value Ref Range    Glucose - Accu-Ck 86 65 - 99 mg/dL   POCT glucose device results    Collection Time: 21  9:02 PM   Result Value Ref Range    Glucose - Accu-Ck 96 65 - 99 mg/dL   EKG    Collection Time: 21 11:47 PM   Result Value Ref Range    Report       [facility]    Test Date:  2021  Pt Name:    KLARISSA BRADSHAW              Department: 183  MRN:        [id number]                      Room:       16  Gender:     Male                         Technician: [id number]  :        1945                   Requested By:ROBERT LUTZ  Order #:    [id number]                    Reading MD:    Measurements  Intervals                                Axis  Rate:       80                           P:          51  NJ:         192                          QRS:        -74  QRSD:       140                          T:          168  QT:         428  QTc:        494    Interpretive Statements  SINUS RHYTHM  RBBB AND LAFB  Compared to ECG 2021 19:31:18  Ventricular premature complex(es) no longer present     POCT glucose device results    Collection Time: 21 12:01 AM   Result Value Ref Range    Glucose - Accu-Ck 140 (H) 65 - 99 mg/dL   CBC WITH DIFFERENTIAL    Collection Time: 21  2:03 AM   Result Value Ref Range    WBC 7.2 4.8 - 10.8 K/uL    RBC 2.72 (L)  4.70 - 6.10 M/uL    Hemoglobin 8.5 (L) 14.0 - 18.0 g/dL    Hematocrit 28.5 (L) 42.0 - 52.0 %    .8 (H) 81.4 - 97.8 fL    MCH 31.3 27.0 - 33.0 pg    MCHC 29.8 (L) 33.7 - 35.3 g/dL    RDW 56.2 (H) 35.9 - 50.0 fL    Platelet Count 330 164 - 446 K/uL    MPV 9.3 9.0 - 12.9 fL    Neutrophils-Polys 75.60 (H) 44.00 - 72.00 %    Lymphocytes 13.20 (L) 22.00 - 41.00 %    Monocytes 9.80 0.00 - 13.40 %    Eosinophils 0.00 0.00 - 6.90 %    Basophils 0.60 0.00 - 1.80 %    Immature Granulocytes 0.80 0.00 - 0.90 %    Nucleated RBC 0.30 /100 WBC    Neutrophils (Absolute) 5.45 1.82 - 7.42 K/uL    Lymphs (Absolute) 0.95 (L) 1.00 - 4.80 K/uL    Monos (Absolute) 0.71 0.00 - 0.85 K/uL    Eos (Absolute) 0.00 0.00 - 0.51 K/uL    Baso (Absolute) 0.04 0.00 - 0.12 K/uL    Immature Granulocytes (abs) 0.06 0.00 - 0.11 K/uL    NRBC (Absolute) 0.02 K/uL    Anisocytosis 1+     Macrocytosis 1+    Basic Metabolic Panel    Collection Time: 07/09/21  2:03 AM   Result Value Ref Range    Sodium 137 135 - 145 mmol/L    Potassium 4.5 3.6 - 5.5 mmol/L    Chloride 99 96 - 112 mmol/L    Co2 25 20 - 33 mmol/L    Glucose 96 65 - 99 mg/dL    Bun 21 8 - 22 mg/dL    Creatinine 3.43 (H) 0.50 - 1.40 mg/dL    Calcium 8.5 8.5 - 10.5 mg/dL    Anion Gap 13.0 7.0 - 16.0   PERIPHERAL SMEAR REVIEW    Collection Time: 07/09/21  2:03 AM   Result Value Ref Range    Peripheral Smear Review see below    PLATELET ESTIMATE    Collection Time: 07/09/21  2:03 AM   Result Value Ref Range    Plt Estimation Normal    MORPHOLOGY    Collection Time: 07/09/21  2:03 AM   Result Value Ref Range    RBC Morphology Present     Poikilocytosis 1+     Ovalocytes 1+    DIFFERENTIAL COMMENT    Collection Time: 07/09/21  2:03 AM   Result Value Ref Range    Comments-Diff see below    ESTIMATED GFR    Collection Time: 07/09/21  2:03 AM   Result Value Ref Range    GFR If  21 (A) >60 mL/min/1.73 m 2    GFR If Non African American 17 (A) >60 mL/min/1.73 m 2        Imaging  DX-CHEST-PORTABLE (1 VIEW)   Final Result      1.  Interval increased aeration and improvement of RIGHT pleural effusion.   2.  No pneumothorax.         US-THORACENTESIS PUNCTURE RIGHT   Final Result   Addendum 1 of 1   Addendum issued to correct volume of fluid withdrawn.  Actual volume of    pleural fluid withdrawn was 2050 mL.      Final      1. Ultrasound guided right sided diagnostic/therapeutic thoracentesis.      2. 2450 mL of fluid withdrawn.      DX-CHEST-PORTABLE (1 VIEW)   Final Result      1.  Moderate to large right pleural effusion with overlying atelectasis/consolidation.   2.  Interstitial prominence likely represents interstitial edema.   3.  Stable cardiomegaly.          Assessment/Plan  * Acute on chronic respiratory failure (HCC)- (present on admission)  Assessment & Plan  Acute respiratory insufficiency  Titrate supplemental O2 to maintain O2 saturation > 92%  Aggressive pulmonary hygiene  Encourage incentive spirometry    Goals of care, counseling/discussion  Assessment & Plan  I had extensive discussion with the patient regarding his condition.  We also called his wife, [place], on speaker phone.  He ultimately made the decision to have a short course of vasopressor administration to see if his BP improved.  He affirms his desire to be DNAR/DNI.    Hypotension  Assessment & Plan  Unclear etiology  Titrate norepinephrine via PIV to maintain MAP > 60 or SBP > 90  Continue midodrine  D/C antihypertensives    End stage renal disease on dialysis (HCC)- (present on admission)  Assessment & Plan  Continue iHD per schedule  Monitor electrolytes and replete as needed    DVT prophylaxis: Clopidegrel  PUD prophylaxis: Omeprazole  Glycemic control: Sliding scale insulin  Nutrition: Diet  Lines: L chest iHD access  Tomas: None  Mobility: Early mobility as tolerated  Goals of care: DNAR/DNI  Disposition: ICU    Discussed patient condition and risk of morbidity and/or mortality with Hospitalist,  Family, RN, RT, Code status disscussed and Patient.    The patient remains critically ill.  Critical care time = 33 minutes in directly providing and coordinating critical care and extensive data review.  No time overlap and excludes procedures.

## 2021-07-09 NOTE — PROGRESS NOTES
MD Honeycutt paged and updated on patient's current BP of 72/33, MD also notified that patient is now having what appears to be visual hallucinations. MD Honeycutt to come to bedside.

## 2021-07-09 NOTE — THERAPY
"Occupational Therapy   Initial Evaluation     Patient Name: Luis Sepulveda  Age:  75 y.o., Sex:  male  Medical Record #: [id number]  Today's Date: 7/9/2021     Precautions  Precautions: Fall Risk  Comments: gaby BRANNON, hypotensive    Assessment  Patient is 75 y.o. male admitted for pleural effusion with acute on chronic respiratory failure. Pt had been at SNF prior to hospital admission where has been for several months. Pt required Simone for bed mobility, limited to EOB due to lack of prosthetic and pain in left hand, pt very pleasant throughout session and very talkative willing to perform basic ADLs and mobility at EOB as able. Will continue to see for skilled therapy and recommend return to post-acute placement when medically cleared.    Plan    Recommend Occupational Therapy 3 times per week until therapy goals are met for the following treatments:  Adaptive Equipment, Self Care/Activities of Daily Living, Therapeutic Activities and Therapeutic Exercises.    DC Equipment Recommendations: Unable to determine at this time  Discharge Recommendations: Recommend post-acute placement for additional occupational therapy services prior to discharge home     Subjective    \"You guys could be generals, you know that?\"     Objective       07/09/21 1101   Prior Living Situation   Prior Services Continuous (24 Hour) Care Giving Per Service   Housing / Facility Skilled Nursing Facility   Steps Into Home 0   Steps In Home 0   Equipment Owned None   Lives with - Patient's Self Care Capacity Attendant / Paid Care Giver   Comments Been at SNF for several months   Prior Level of ADL Function   Self Feeding Independent   Grooming / Hygiene Independent   Bathing Independent   Dressing Independent   Toileting Independent   Comments prior to recent admissions   Prior Level of IADL Function   Medication Management Independent   Laundry Independent   Kitchen Mobility Independent   Finances Independent   Home Management Independent "   Shopping Independent   Prior Level Of Mobility Independent Without Device in Community   Driving / Transportation Driving Independent   Comments prior to recent admissions   History of Falls   History of Falls Yes   Precautions   Precautions Fall Risk   Pain 0 - 10 Group   Therapist Pain Assessment Post Activity Pain Same as Prior to Activity;Nurse Notified   Non Verbal Descriptors   Non Verbal Scale  Calm   Cognition    Cognition / Consciousness WDL   Level of Consciousness Alert   Comments Pleasant, cooperative   Active ROM Upper Body   Active ROM Upper Body  WDL   Dominant Hand Right   Strength Upper Body   Upper Body Strength  WDL   Sensation Upper Body   Upper Extremity Sensation  WDL   Upper Body Muscle Tone   Upper Body Muscle Tone  WDL   Neurological Concerns   Neurological Concerns No   Coordination Upper Body   Coordination WDL   Balance Assessment   Sitting Balance (Static) Fair   Sitting Balance (Dynamic) Fair   Weight Shift Sitting Fair   Comments limited to EOB   Bed Mobility    Supine to Sit Minimal Assist   Scooting Supervised   ADL Assessment   Grooming Supervision;Seated   Upper Body Dressing Supervision   Lower Body Dressing Minimal Assist   How much help from another person does the patient currently need...   Putting on and taking off regular lower body clothing? 3   Bathing (including washing, rinsing, and drying)? 3   Toileting, which includes using a toilet, bedpan, or urinal? 3   Putting on and taking off regular upper body clothing? 3   Taking care of personal grooming such as brushing teeth? 3   Eating meals? 3   6 Clicks Daily Activity Score 18   Functional Mobility   Sit to Stand Unable to Participate   Bed, Chair, Wheelchair Transfer Unable to Participate   Toilet Transfers Unable to Participate   Mobility bed mobility, EOB only   Comments limited to EOB   ICU Target Mobility Level   ICU Mobility - Targeted Level Level 2   Visual Perception   Visual Perception  Not Tested   Edema /  Skin Assessment   Edema / Skin  Not Assessed   Activity Tolerance   Sitting Edge of Bed left EOB   Standing NT-unable   Patient / Family Goals   Patient / Family Goal #1 To eventually go home   Short Term Goals   Short Term Goal # 1 Pt will complete ADL transfers with Simone   Short Term Goal # 2 Pt will complete LB dressing with supervision   Short Term Goal # 3 Pt will complete toileting with supervision   Education Group   Education Provided Role of Occupational Therapist   Role of Occupational Therapist Patient Response Patient;Acceptance;Explanation   Problem List   Problem List Decreased Active Daily Living Skills;Decreased Homemaking Skills;Decreased Functional Mobility;Decreased Activity Tolerance;Impaired Postural Control / Balance   Interdisciplinary Plan of Care Collaboration   IDT Collaboration with  Nursing;Physical Therapist   Patient Position at End of Therapy Seated;Edge of Bed;Call Light within Reach;Tray Table within Reach;Phone within Reach  (RN ok'd for EOB)   Collaboration Comments RN updated

## 2021-07-09 NOTE — PROGRESS NOTES
[facility] Services Progress Note    Hemodialysis treatment ordered today per Dr. Hodges x 3 hours. Treatment initiated at 1536 and ended at 1836.    Labile BP noted during HD treatment, Pt asymptomatic. UF goal adjusted per BP tolerance. ; see paper flow sheets for details.    Net UF 1,000 mL    Post tx, CVC flushed with saline then locked with heparin 1000 units/mL per designated amount in each wing then clamped and capped. Dsg, changed. Aspirate heparin prior to next CVC use.    Report given to Primary RN.

## 2021-07-09 NOTE — PROGRESS NOTES
0415 - RRT RN x2 at bedside. Pt HOTN w/ SBPs down to the 50s. See flowsheets for specifics. Dr Honeycutt at bedside.     0427 - Levophed gtt started at 10 mcg/min through PIV for BP 52/33, ok per MD. Pt to be HLC to T6 for definitive care.

## 2021-07-09 NOTE — PROGRESS NOTES
Patient stating he's experiencing 10/10 burning pain in left hand and at port site, patient was given PRN pain medication at 1937 with no relief. MD paged and notified. MD to view chart and place order.

## 2021-07-09 NOTE — PROGRESS NOTES
Received report and assumed pt care. Pt is asleep and in bed. Levo currently off, VSS. Call light with reach and bed alarm on. No needs at this time.

## 2021-07-09 NOTE — THERAPY
Physical Therapy   Initial Evaluation     Patient Name: Luis Sepulveda  Age:  75 y.o., Sex:  male  Medical Record #: [id number]  Today's Date: 7/9/2021     Precautions: Fall Risk    Assessment  Patient is 75 y.o. male with a diagnosis of pleural effusion with acute on chronic respiratory failure.  Pt with recent Left BKA with incision still healing. 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 Other PMH includes CAD with stent, CHF with 30% EF, DM, ESRD (dialysis T/Th/Sat), HTN, afib, PVD, Hx MI, OA, neuropathy. Pt was brought to hospital from [facility], where he has been for several months when not in the hospital ([facility] recent hospitalizations). He states he is working on standing/walker with R prosthesis at rehab. He reports he can transfer slowly to W/C with out without R prosthesis, though this was not attempted today due to variable blood pressures. Pt with low BP in supine, though increased to WFL with sitting EOB. PT educated ROM for bilat knees and hips to maintain for prosthetics, and pt stating he is very familiar with these tasks. Pt will continue to benefit from skilled PT while he remains in acute setting to maintain his mobility and promote independence. Recommend further PT in the post-acute setting prior to D/C home.    Plan    Recommend Physical Therapy 3 times per week until therapy goals are met for the following treatments:  Bed Mobility, Gait Training, Neuro Re-Education / Balance, Therapeutic Activities and Therapeutic Exercises    DC Equipment Recommendations: (P) Unable to determine at this time  Discharge Recommendations: (P) Recommend post-acute placement for additional physical therapy services prior to discharge home       Subjective    Pt reports feeling better today. Talkative. Appears to enjoy company.     Objective       07/09/21 1050   Prior Living Situation   Prior Services Continuous (24 Hour) Care Giving  Per Service  (SNF)   Housing / Facility Skilled Nursing Facility   Steps Into Home 0   Steps In Home 0   Lives with - Patient's Self Care Capacity Attendant / Paid Care Giver   Comments pt has been at SNF for several months; he states he can transfer bed to chair without help with R prosthesis or no prosthesis, though it requires increased time   Prior Level of Functional Mobility   Bed Mobility Independent   Transfer Status Independent   Ambulation Required Assist   Assistive Devices Used Front-Wheel Walker  (R LE prosthesis)   Wheelchair Independent   Comments primary use of W/C   History of Falls   History of Falls Yes   Date of Last Fall   (last month)   Cognition    Level of Consciousness Alert   Comments participatory and cooperative with PT; likes to tell stories from his past; good attention to safety   Passive ROM Lower Body   Comments knee extension limited in sitting   Active ROM Lower Body    Comments WFL   Balance Assessment   Sitting Balance (Static) Fair   Sitting Balance (Dynamic) Fair   Weight Shift Sitting Fair   Comments at EOB; standing not assessed   Gait Analysis   Gait Level Of Assist Unable to Participate   Comments unable to perform today due to lack of R prosthesis   Bed Mobility    Supine to Sit Minimal Assist   Scooting Supervised   Functional Mobility   Sit to Stand Unable to Participate   Mobility supine->sit EOB   Comments pt does not have R prosthesis for attempt at transfer   ICU Target Mobility Level   ICU Mobility - Targeted Level Level 2   How much difficulty does the patient currently have...   Turning over in bed (including adjusting bedclothes, sheets and blankets)? 3   Sitting down on and standing up from a chair with arms (e.g., wheelchair, bedside commode, etc.) 1   Moving from lying on back to sitting on the side of the bed? 1   How much help from another person does the patient currently need...   Moving to and from a bed to a chair (including a wheelchair)? 1   Need to  walk in a hospital room? 1   Climbing 3-5 steps with a railing? 1   6 clicks Mobility Score 8   Activity Tolerance   Sitting in Chair NT   Sitting Edge of Bed left up EOB   Standing NT   Comments tolerated sitting well   Edema / Skin Assessment   Comments poor perfusion left distal fingers   Patient / Family Goals    Patient / Family Goal #1 standing   Short Term Goals    Short Term Goal # 1 Pt will perform bed mobility with supv within 6 visits   Short Term Goal # 2 Pt will transfer bed<->W/C with supv within 6 visits in order to return home   Short Term Goal # 3 Pt will transfer sit to stand with min A with R prosthesis and FWW within 6 visits in order to promote return home   Education Group   Education Provided Role of Physical Therapist   Role of Physical Therapist Patient Response Patient;Acceptance;Explanation;Verbal Demonstration   Problem List    Problems Pain;Impaired Bed Mobility;Impaired Transfers;Impaired Ambulation;Functional ROM Deficit;Functional Strength Deficit;Impaired Balance;Decreased Activity Tolerance   Anticipated Discharge Equipment and Recommendations   DC Equipment Recommendations Unable to determine at this time   Discharge Recommendations Recommend post-acute placement for additional physical therapy services prior to discharge home

## 2021-07-09 NOTE — PLAN OF CARE (IOPOC)
75-year-old male with a history of ESRD status post thoracentesis now with hypotension. Post CXR reassuring.  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  Unclear if he is infected causing hypotension, he will be placed on broad-spectrum antibiotics following blood cultures and titrate Levophed to MAP greater than 65.    Waylon Smith M.D.

## 2021-07-09 NOTE — PROGRESS NOTES
Spoke with power of  [place] at , and discussed patient's change in clinical condition. Relayed information that patient's blood pressure has consistently stayed with systolic BP from 60-80s for several hours despite fluids and midodrine. Stated that patient has been having visual hallucinations since his blood pressure dropped. Due to persistently decreased blood pressure, advanced care directives was discussed with [place]. She states that she continues to want DNR/DNI, but is agreeable to central line placement for pressor administration to maintain patient's hemodynamics.    At this time, ICU has been notified. Case discussed with intensivist, who will come see patient at bedside. Peripheral levophed has been started in the meantime w/ goals to maintain MAP of at least 65.

## 2021-07-09 NOTE — PROGRESS NOTES
MD Honeycutt paged and updated on patient being hypotensive with BP of 86/57. Patient is asymptomatic, MD ordered 10 mg PO of midodrine at this time.

## 2021-07-09 NOTE — PROGRESS NOTES
"[facility] -  PROGRESS NOTE               Author: Kulwant Hodges M.D. Date & Time: 7/9/2021  9:20 AM     HPI:  74 y/o man with ESRD HD T,TH,SAt presented with SOB. Pt has reccurrent pleural effusion. Pt /sp thoracentisis in the past.  Pt with recent ligation of left arm AVF  Pt makes a small amount of urine    DAILY NEPHROLOGY SUMMARY:  7/9: HD yesterday 1 liter UF, but trasnferred to ICU for low BP. He is on pressors now    REVIEW OF SYSTEMS:    10 point ROS reviewed and is as per HPI/daily summary or otherwise negative    PMH/PSH/SH/FH: Reviewed and unchanged since admission note  CURRENT MEDICATIONS: Reviewed from admission to present day    VS:  BP (!) 119/21   Pulse 83   Temp 36.9 °C (98.4 °F) (Temporal)   Resp 20   Ht 1.676 m (5' 6\")   Wt 75.8 kg (167 lb 1.7 oz)   SpO2 99%   BMI 26.97 kg/m²   Physical Exam  Vitals and nursing note reviewed.   HENT:      Head: Normocephalic and atraumatic.      Mouth/Throat:      Mouth: Mucous membranes are dry.   Eyes:      Extraocular Movements: Extraocular movements intact.      Pupils: Pupils are equal, round, and reactive to light.   Cardiovascular:      Rate and Rhythm: Normal rate and regular rhythm.      Heart sounds: Normal heart sounds.   Pulmonary:      Breath sounds: Rales present.   Abdominal:      General: There is no distension.      Tenderness: There is no abdominal tenderness.   Musculoskeletal:      Cervical back: Normal range of motion and neck supple.      Comments: B/l BKA   Skin:     Comments: Left finger necrosis   Neurological:      Mental Status: He is alert. He is disoriented.   Psychiatric:         Mood and Affect: Mood normal.         Behavior: Behavior normal.         Fluids:  In: 1014.9 [I.V.:14.9; Dialysis:500]  Out: 1500     LABS:  Recent Labs     07/07/21  1823 07/08/21  0319 07/09/21  0203   SODIUM 137 132* 137   POTASSIUM 4.7 4.3 4.5   CHLORIDE 96 93* 99   CO2 30 28 25   GLUCOSE 73 120* 96   BUN 30* 37* 21 "   CREATININE 4.75* 5.03* 3.43*   CALCIUM 9.3 9.1 8.5       IMPRESSION:  # ESRD T,TH,Sat  - Etiology likely 2/2 vascualr disease  # Hypotension chronic, titrate pressors  # Anemia of CKD  # PVD  #SOB mild improvement  #CHF with decreased EF 30%    Plan  Titrate pressors  No HD today Friday  Midodrine

## 2021-07-09 NOTE — ASSESSMENT & PLAN NOTE
7/10 Dr. Gonda had extensive discussion with the patient regarding his condition.  We also called his wife, Idniana, on speaker phone.  He ultimately made the decision to have a short course of vasopressor administration to see if his BP improved.  He affirms his desire to be DNAR/DNI.    7/12 had long discussion with patient regarding goal of care. Patient will benefit from palliative care consult. Order was placed.     7/14 discussed with Palliative care regarding patient's goal of care.

## 2021-07-10 PROBLEM — R65.21 SEPTIC SHOCK (HCC): Status: ACTIVE | Noted: 2021-01-01

## 2021-07-10 PROBLEM — A41.9 SEPTIC SHOCK (HCC): Status: ACTIVE | Noted: 2021-01-01

## 2021-07-10 PROBLEM — I95.9 HYPOTENSION: Status: RESOLVED | Noted: 2019-11-28 | Resolved: 2021-01-01

## 2021-07-10 NOTE — PROGRESS NOTES
Monitor Summary. SR 80s    Media Information  File Link    Monitoring Strips - Scan on 7/9/2021 2:46 PM      Key Information    Document ID File Type Document Type Description   [id number] Image Monitoring Strips    Import Information    Attached At Date Time User Dept   Encounter Level 7/9/2021  2:45 PM Physician Outpatient    Encounter    Hospital Encounter on 7/7/21        Document Information    Misc Clinical:  Monitoring Strips      07/09/2021 14:46   Attached To:   Hospital Encounter on 7/7/21   Source Information    Physician Outpatient

## 2021-07-10 NOTE — CARE PLAN
Problem: Skin Integrity  Goal: Skin integrity is maintained or improved  Outcome: Progressing     Problem: Pain - Standard  Goal: Alleviation of pain or a reduction in pain to the patient’s comfort goal  Outcome: Progressing     Problem: Fall Risk  Goal: Patient will remain free from falls  Outcome: Progressing     Problem: Knowledge Deficit - Standard  Goal: Patient and family/care givers will demonstrate understanding of plan of care, disease process/condition, diagnostic tests and medications  Outcome: Progressing          Progress made toward(s) clinical / shift goals:Patient is alert and oriented times 2 and has some skin issues. Patient is a BKA and has gangrene of the fingers (left Hand) would consult has been put and and we are monitoring his pain at the moment. Will continue to monitor throughout the shift.

## 2021-07-10 NOTE — PROGRESS NOTES
Critical Care Progress Note    Date of admission  7/7/2021    Chief Complaint  75 y.o. male w/ ESRD, CHF, DM, who presented 7/7/2021 with shortness of breath and underwent a large volume thoracentesis and became hypotensive early this morning associated with some hallucinations.  He was given 500 mL crystalloid bolus and due to increasing rales and O2 requirement no further fluid was given.    Hospital Course  7/9 admitted to ICU  7/10 NE @ 5 for MAP > 65    Interval Problem Update  Reviewed last 24 hour events:  Remains critically ill  MAP > 65 on NE @ 10 mcg/min  VTE ppx  iHDS per nephrology  Vanc + Zosyn  Cultures in process   Home NOAC    Review of Systems  Review of Systems   Unable to perform ROS: Mental acuity        Vital Signs for last 24 hours   Temp:  [36 °C (96.8 °F)-37.3 °C (99.1 °F)] 37.3 °C (99.1 °F)  Pulse:  [] 94  Resp:  [8-24] 16  BP: ()/(16-72) 109/44  SpO2:  [87 %-100 %] 99 %    Hemodynamic parameters for last 24 hours       Respiratory Information for the last 24 hours       Physical Exam   Physical Exam  Vitals and nursing note reviewed.   HENT:      Head: Normocephalic and atraumatic.      Right Ear: External ear normal.      Left Ear: External ear normal.      Nose: Nose normal.      Mouth/Throat:      Mouth: Mucous membranes are moist.   Eyes:      Pupils: Pupils are equal, round, and reactive to light.   Cardiovascular:      Rate and Rhythm: Normal rate. Rhythm irregular.      Pulses: Normal pulses.      Heart sounds: No murmur heard.   No gallop.    Pulmonary:      Effort: Pulmonary effort is normal. No respiratory distress.      Breath sounds: No wheezing or rales.   Chest:      Chest wall: No tenderness.   Abdominal:      General: Abdomen is flat. There is no distension.      Palpations: Abdomen is soft.      Tenderness: There is no abdominal tenderness. There is no guarding or rebound.   Musculoskeletal:      Cervical back: No rigidity.      Comments: Bilateral BKAs; left is  mildly erythematous with mttp. Left hand has dry gangrene with erythematous skin and mttp   Lymphadenopathy:      Cervical: No cervical adenopathy.   Skin:     General: Skin is warm and dry.      Capillary Refill: Capillary refill takes less than 2 seconds.   Neurological:      Mental Status: He is alert and oriented to person, place, and time.      Motor: No weakness.         Medications  Current Facility-Administered Medications   Medication Dose Route Frequency Provider Last Rate Last Admin   • midodrine (PROAMATINE) tablet 15 mg  15 mg Oral TID WITH MEALS Waylon Smith M.D.   15 mg at 07/10/21 1223   • heparin injection 5,000 Units  5,000 Units Subcutaneous Q8HRS Waylon Smith M.D.   5,000 Units at 07/10/21 1542   • norepinephrine (Levophed) 8 mg in 250 mL NS infusion (premix)  0-30 mcg/min Intravenous Continuous Waylon Smith M.D. 18.8 mL/hr at 07/10/21 1512 10 mcg/min at 07/10/21 1512   • MD Alert...Vancomycin per Pharmacy   Other PHARMACY TO DOSE Waylon Smith M.D.       • piperacillin-tazobactam (ZOSYN) 4.5 g in  mL IVPB  4.5 g Intravenous Q12HRS Waylon Smith M.D.   Stopped at 07/10/21 1029   • epoetin (Retacrit) injection (Dialysis Use Only) 4,000 Units  4,000 Units Subcutaneous TUE+THU+SAT Kulwant Hodges M.D.   4,000 Units at 07/10/21 1108   • heparin injection 2,000 Units  2,000 Units Intravenous DIALYSIS PRN Kulwant Hodges M.D.   2,000 Units at 07/10/21 0946   • heparin injection 3,700 Units  3,700 Units Intracatheter DIALYSIS PRN Kulwant Hodges M.D.   3,700 Units at 07/10/21 1157   • gabapentin (NEURONTIN) capsule 300 mg  300 mg Oral TID James Mott D.BRIAN   300 mg at 07/10/21 1223   • atorvastatin (LIPITOR) tablet 40 mg  40 mg Oral QHS Fortino Tomlinson M.D.   40 mg at 07/09/21 2051   • calcitRIOL (ROCALTROL) capsule 0.25 mcg  0.25 mcg Oral DAILY Fortino Tomlinson M.D.   0.25 mcg at 07/10/21 0629   • clopidogrel (PLAVIX) tablet 75 mg  75 mg Oral DAILY Fortino Tomlinson M.D.   75 mg  at 07/10/21 0630   • insulin glargine (Semglee) injection  10 Units Subcutaneous QHS Fortino Tomlinson M.D.   10 Units at 07/09/21 2100   • methimazole (TAPAZOLE) tablet 5 mg  5 mg Oral BID Fortino Tomlinson M.D.   5 mg at 07/10/21 0629   • omeprazole (PRILOSEC) capsule 20 mg  20 mg Oral BID Fortino Tomlinson M.D.   20 mg at 07/10/21 0629   • oxyCODONE-acetaminophen (PERCOCET) 5-325 MG per tablet 1 tablet  1 tablet Oral Q4HRS PRN Fortino Tomlinson M.D.   1 tablet at 07/08/21 1937   • sevelamer carbonate (RENVELA) tablet 1,600 mg  1,600 mg Oral TID WITH MEALS Fortino Tomlinson M.D.   1,600 mg at 07/10/21 1223   • tamsulosin (FLOMAX) capsule 0.8 mg  0.8 mg Oral QHS Fortino Tomlinson M.D.   0.8 mg at 07/09/21 2051   • traMADol (ULTRAM) 50 MG tablet 50 mg  50 mg Oral Q6HRS PRN Fortino Tomlinson M.D.   50 mg at 07/08/21 2059   • traZODone (DESYREL) tablet 50 mg  50 mg Oral QHS Fortino Tomlinson M.D.   50 mg at 07/09/21 2051   • senna-docusate (PERICOLACE or SENOKOT S) 8.6-50 MG per tablet 2 tablet  2 tablet Oral BID Fortino Tomlinson M.D.   2 tablet at 07/10/21 0630    And   • polyethylene glycol/lytes (MIRALAX) PACKET 1 Packet  1 Packet Oral QDAY PRN Fortino Tomlinson M.D.        And   • bisacodyl (DULCOLAX) suppository 10 mg  10 mg Rectal QDAY PRN Fortino Tomlinson M.D.       • guaiFENesin dextromethorphan (ROBITUSSIN DM) 100-10 MG/5ML syrup 10 mL  10 mL Oral Q6HRS PRN Fortino Tomlinson M.D.       • ondansetron (ZOFRAN) syringe/vial injection 4 mg  4 mg Intravenous Q4HRS PRN Fortino Tomlinson M.D.       • ondansetron (ZOFRAN ODT) dispertab 4 mg  4 mg Oral Q4HRS PRN Fortino Tomlinson M.D.       • insulin regular (HumuLIN R,NovoLIN R) injection  1-6 Units Subcutaneous 4X/DAY OLINDA Tomlinson M.D.   1 Units at 07/09/21 2100    And   • glucose 4 g chewable tablet 16 g  16 g Oral Q15 MIN PRN Fortino Tomlinson M.D.        And   • dextrose 50% (D50W) injection 50 mL  50 mL Intravenous Q15 MIN PRN Fortino Tomlinson M.D.           Fluids    Intake/Output Summary (Last 24 hours) at 7/10/2021 5834  Last data filed at  7/10/2021 1215  Gross per 24 hour   Intake 1312.38 ml   Output 1900 ml   Net -587.62 ml       Laboratory          Recent Labs     07/07/21 1823 07/08/21 0319 07/09/21  0203   SODIUM 137 132* 137   POTASSIUM 4.7 4.3 4.5   CHLORIDE 96 93* 99   CO2 30 28 25   BUN 30* 37* 21   CREATININE 4.75* 5.03* 3.43*   CALCIUM 9.3 9.1 8.5     Recent Labs     07/07/21 1823 07/08/21 0319 07/09/21  0203   ALTSGPT <5 <5  --    ASTSGOT 10* 7*  --    ALKPHOSPHAT 147* 139*  --    TBILIRUBIN <0.2 0.2  --    GLUCOSE 73 120* 96     Recent Labs     07/07/21 1823 07/07/21 1823 07/08/21 0319 07/08/21  1153 07/09/21  0203   WBC 7.9  --  7.4  --  7.2   NEUTSPOLYS 74.10*  --  72.30*  --  75.60*   LYMPHOCYTES 14.10*  --  16.30*  --  13.20*   MONOCYTES 10.50  --  10.00  --  9.80   EOSINOPHILS 0.00   < > 0.00 2 0.00   BASOPHILS 0.50  --  0.50  --  0.60   ASTSGOT 10*  --  7*  --   --    ALTSGPT <5  --  <5  --   --    ALKPHOSPHAT 147*  --  139*  --   --    TBILIRUBIN <0.2  --  0.2  --   --     < > = values in this interval not displayed.     Recent Labs     07/07/21 1823 07/08/21 0319 07/09/21  0203   RBC 2.58* 2.67* 2.72*   HEMOGLOBIN 8.2* 8.4* 8.5*   HEMATOCRIT 26.6* 28.2* 28.5*   PLATELETCT 305 307 330   PROTHROMBTM  --  19.0*  --    APTT  --  43.7*  --    INR  --  1.64*  --        Imaging  X-Ray:  I have personally reviewed the images and compared with prior images.    Assessment/Plan  * Acute on chronic respiratory failure (HCC)- (present on admission)  Assessment & Plan  Acute respiratory insufficiency  Titrate supplemental O2 to maintain O2 saturation > 92%  Aggressive pulmonary hygiene  Encourage incentive spirometry    Septic shock (HCC)  Assessment & Plan  This is Septic shock Present on admission  SIRS criteria identified on my evaluation include: Tachycardia, with heart rate greater than 90 BPM and Tachypnea, with respirations greater than 20 per minute  Source is unknown   Presentation includes: hypotension after modified sepsis  bolus  Despite appropriate fluid resuscitation with crystalloid given per sepsis guidelines, the patient remains hypotensive with systolic blood pressure less than 90 or MAP less than 65  Hemodynamic support with additional fluids and IV vasopressors as needed to maintain a SBP of 90 or MAP of 65  IV antibiotics as appropriate for source of sepsis  Reassessment: I have reassessed the patient's hemodynamic status    Potential sources: L BKA site, line infection, L hand dry gangrene  Vancomycin + Zosyn  NE titrated to MAP > 65  Follow cultures    Pleural effusion- (present on admission)  Assessment & Plan  Cultures NGTD    Ischemia of finger- (present on admission)  Assessment & Plan  Dry gangrene of L hand  Warm to touch, erythematous  Empiric antibiotics  Wound care    Goals of care, counseling/discussion  Assessment & Plan  I had extensive discussion with the patient regarding his condition.  We also called his wife, [place], on speaker phone.  He ultimately made the decision to have a short course of vasopressor administration to see if his BP improved.  He affirms his desire to be DNAR/DNI.    Paroxysmal atrial fibrillation (HCC)- (present on admission)  Assessment & Plan  Rate controlled  Optimize electrolytes  Home NOAC    End stage renal disease on dialysis (HCC)- (present on admission)  Assessment & Plan  Continue iHD per schedule  Monitor electrolytes and replete as needed    Type 2 diabetes mellitus with kidney complication, with long-term current use of insulin (HCC)- (present on admission)  Assessment & Plan  Goal glucose 140-180  SSI + LA    CAD (coronary artery disease)- (present on admission)  Assessment & Plan  ASA + Statin        VTE:  Heparin  Ulcer: Not Indicated  Lines: Central Line  Ongoing indication addressed    I have performed a physical exam and reviewed and updated ROS and Plan today (7/10/2021). In review of yesterday's note (7/9/2021), there are no changes except as documented above.      Discussed patient condition and risk of morbidity and/or mortality with Hospitalist, RN, RT, Charge nurse / hot rounds and Patient  The patient remains critically ill.  Critical care time = 41 minutes in directly providing and coordinating critical care and extensive data review.  No time overlap and excludes procedures.

## 2021-07-10 NOTE — PROCEDURES
Central Line Insertion    Date/Time: 7/9/2021 5:26 PM  Performed by: Mike Silva D.O.  Authorized by: Mike Silva D.O.     Consent:     Consent obtained:  Verbal    Consent given by:  Patient    Risks discussed:  Arterial puncture, bleeding, incorrect placement, pneumothorax and infection    Alternatives discussed:  No treatment  Pre-procedure details:     Hand hygiene: Hand hygiene performed prior to insertion      Sterile barrier technique: All elements of maximal sterile technique followed      Skin preparation:  2% chlorhexidine  Anesthesia:     Anesthesia method:  Local infiltration    Local anesthetic:  Lidocaine 1% w/o epi  Procedure details:     Location:  L internal jugular    Patient position:  Trendelenburg    Procedural supplies:  Triple lumen    Catheter size:  7 Fr    Landmarks identified: yes      Ultrasound guidance: yes      Number of attempts:  1    Successful placement: yes    Post-procedure details:     Post-procedure:  Dressing applied and line sutured    Assessment:  Blood return through all ports and free fluid flow    Patient tolerance of procedure:  Tolerated well, no immediate complications  Comments:      Initially attempted Right subclavian placement.  Subclavian vein accessed but could not thread wire.  Converted to left IJ for placement under U/S guidance.

## 2021-07-10 NOTE — ASSESSMENT & PLAN NOTE
This is Septic shock Present on admission  SIRS criteria identified on my evaluation include: Tachycardia, with heart rate greater than 90 BPM and Tachypnea, with respirations greater than 20 per minute  Source is unknown   Presentation includes: hypotension after modified sepsis bolus  Despite appropriate fluid resuscitation with crystalloid given per sepsis guidelines, the patient remains hypotensive with systolic blood pressure less than 90 or MAP less than 65  Hemodynamic support with additional fluids and IV vasopressors as needed to maintain a SBP of 90 or MAP of 65  IV antibiotics as appropriate for source of sepsis  Reassessment: I have reassessed the patient's hemodynamic status     Potential sources: L hand dry gangrene/abscess. Doubt left BKA as this doesn't appear infected. Line infection is a possibility   MRSA screen is negative, discontinue vancomycin.   Continue piptazo   Pt has hx of ESBL Klebsiella in foot in the past. I to unasyn, S to piptazo.   Can target goal BP >90 or MAP >60.   On NE gtt today minimal dose at 2mcg/min  On midodrine  Follow cultures  Xray of left hand w/o evidence of OM. CRP is high, procal is high.   MRI hand is pending.   Depending on the result of MRI hand, if OM is present, may need prolonged duration of antibiotics

## 2021-07-10 NOTE — PROGRESS NOTES
[facility] Progress Note     Hemodialysis treatment ordered today per Dr. Stein x 3 hours. Treatment initiated at 0915 and ended at 1215.     Pt tolerated treatment; see paper flow sheets for details.  Episode of Low BP  Dressing change done + Redness exit site, No discharge.        Net UF 1400 mL     Post tx, CVC flushed with saline then locked with heparin 1000 units/mL per designated amount in each wing then clamped and capped. Aspirate heparin prior to next CVC use.     Report given to Primary RN.  Deanna Soares.

## 2021-07-10 NOTE — PROGRESS NOTES
"[facility] -  PROGRESS NOTE               Author: TABATHA Alvarez Date & Time: 7/10/2021  12:30 PM     HPI:  74 y/o man with ESRD HD T,TH,SAt presented with SOB. Pt has reccurrent pleural effusion. Pt /sp thoracentisis in the past.  Pt with recent ligation of left arm AVF  Pt makes a small amount of urine    DAILY NEPHROLOGY SUMMARY:  7/9: HD yesterday 1 liter UF, but trasnferred to ICU for low BP. He is on pressors now  7/10: Pt sitting up in bed, comfortable, L IJ CVC placed yesterday, on 15 mcg levo gtt, completed iHD today, resp status stable    REVIEW OF SYSTEMS:    10 point ROS reviewed and is as per HPI/daily summary or otherwise negative    PMH/PSH/SH/FH: Reviewed and unchanged since admission note  CURRENT MEDICATIONS: Reviewed from admission to present day    VS:  /44   Pulse 94   Temp 37.3 °C (99.1 °F) (Temporal)   Resp 16   Ht 1.676 m (5' 6\")   Wt 74.1 kg (163 lb 5.8 oz)   SpO2 99%   BMI 26.37 kg/m²   Physical Exam  Vitals and nursing note reviewed.   HENT:      Head: Normocephalic and atraumatic.      Mouth/Throat:      Mouth: Mucous membranes are dry.   Eyes:      Extraocular Movements: Extraocular movements intact.      Pupils: Pupils are equal, round, and reactive to light.   Cardiovascular:      Rate and Rhythm: Normal rate and regular rhythm.      Heart sounds: Normal heart sounds.   Pulmonary:      Effort: Pulmonary effort is normal.      Breath sounds: No rales.      Comments: Diminished t/o   Abdominal:      General: There is no distension.      Tenderness: There is no abdominal tenderness.   Musculoskeletal:      Cervical back: Normal range of motion and neck supple.      Comments: B/l BKA   Skin:     General: Skin is warm and dry.      Findings: Erythema (L BKA) present.      Comments: Necrotic L fingers/dry gangrene   Neurological:      General: No focal deficit present.      Mental Status: He is alert and oriented to person, place, and time. "   Psychiatric:         Mood and Affect: Mood normal.         Behavior: Behavior normal.         Fluids:  In: 490.7 [I.V.:76.8]  Out: 0     LABS:  Recent Labs     07/07/21  1823 07/08/21  0319 07/09/21  0203   SODIUM 137 132* 137   POTASSIUM 4.7 4.3 4.5   CHLORIDE 96 93* 99   CO2 30 28 25   GLUCOSE 73 120* 96   BUN 30* 37* 21   CREATININE 4.75* 5.03* 3.43*   CALCIUM 9.3 9.1 8.5     Available labs, imaging and clinical documentation reviewed.     IMPRESSION:  # ESRD, dependent on HD qTTS  - Etiology likely 2/2 vascular disease  # Hypotension, acute on chronic  - Currently requiring vasopressor therapy  - On 15 mg midodrine TID  # Anemia of CKD, below target   - ARMANDO with HD, goal Hgb 10-11  # PVD, severe  - On Plavix   # Acute on chronic respiratory failure, improved   - s/p R thora 7/8 with 2.4 L removed  - On supp O2 via NC  # HFrEF  - EF 30%   # CKD-MBD  - Managed at OP unit  - On calcitriol and sevelamer   # Hyperthyroidism, on methimazole  # HLD, on statin therapy  # BPH, on tamsulosin    Plan:  Continue iHD qTTS/PRN, treatment due today (SAT)  UF as able  Wean pressors as able  Continue midodrine  Maintain MAP >65 mmHg  Max dose gabapentin in ESRD is 300 mg/day  Dose all meds for eGFR <15   Avoid nephrotoxins/NSAIDs  Renal/low Na/fluid restricted diet  Continue calcitriol, and sevelamer with meals  ARMANDO with HD, goal Hgb 10-11  Transfuse PRN Hgb <7   Pt is at high risk for further morbidity/mortality with poor overall prognosis   Follow labs    Thank you,

## 2021-07-11 NOTE — PROGRESS NOTES
Critical Care Progress Note    Date of admission  7/7/2021    Chief Complaint  75 y.o. male w/ ESRD, CHF, DM, who presented 7/7/2021 with shortness of breath and underwent a large volume thoracentesis and became hypotensive early this morning associated with some hallucinations.  He was given 500 mL crystalloid bolus and due to increasing rales and O2 requirement no further fluid was given.    Hospital Course  7/9 admitted to ICU  7/10 NE @ 5 for MAP > 65    Interval Problem Update  Reviewed last 24 hour events:  Remains critically ill  HR [id number]  SBP in 90-130s.   Remains on NE gtt to maintain MAP >65. At 7mcg/min  Diuresed yesterday and made 1L UOP  I/O negative 1L in the past 24 hours, negative 1.1L since admission   Had IHD yesterday, 2L UF  Vanc + Zosyn  WBC 7.3, plt 321  hgb 8.3      Review of Systems  Review of Systems   Constitutional: Negative for fever and malaise/fatigue.   Respiratory: Negative for cough and shortness of breath.    Cardiovascular: Negative for chest pain, orthopnea and leg swelling.   Gastrointestinal: Negative for abdominal pain, diarrhea, nausea and vomiting.   Musculoskeletal: Negative for back pain, falls and joint pain.        Tender in left hand with movement, mainly left 2nd, 3rd and 4th.    Skin: Negative for rash.   Neurological: Negative for weakness and headaches.   All other systems reviewed and are negative.       Vital Signs for last 24 hours   Temp:  [36.2 °C (97.2 °F)-37.3 °C (99.1 °F)] 36.2 °C (97.2 °F)  Pulse:  [] 96  Resp:  [5-22] 16  BP: ()/(30-74) 104/46  SpO2:  [82 %-100 %] 100 %    Hemodynamic parameters for last 24 hours       Respiratory Information for the last 24 hours       Physical Exam   Physical Exam  Vitals and nursing note reviewed.   Constitutional:       Comments: Sitting in chair, comfortable   HENT:      Head: Normocephalic.      Mouth/Throat:      Mouth: Mucous membranes are moist.   Eyes:      Pupils: Pupils are equal, round, and  reactive to light.   Cardiovascular:      Rate and Rhythm: Normal rate. Rhythm irregular.      Pulses: Normal pulses.      Heart sounds: No murmur heard.   No gallop.    Pulmonary:      Effort: Pulmonary effort is normal. No respiratory distress.      Breath sounds: No wheezing or rales.   Chest:      Chest wall: No tenderness.   Abdominal:      General: Abdomen is flat. There is no distension.      Palpations: Abdomen is soft.      Tenderness: There is no abdominal tenderness. There is no guarding or rebound.   Musculoskeletal:      Comments: Bilateral BKAs; left is mildly erythematous with mttp. Left hand has dry gangrene with erythematous skin and mttp   Skin:     General: Skin is warm.      Capillary Refill: Capillary refill takes less than 2 seconds.   Neurological:      Mental Status: He is alert and oriented to person, place, and time.      Motor: No weakness.         Medications  Current Facility-Administered Medications   Medication Dose Route Frequency Provider Last Rate Last Admin   • midodrine (PROAMATINE) tablet 15 mg  15 mg Oral TID WITH MEALS Waylon Smith M.D.   15 mg at 07/11/21 0632   • heparin injection 5,000 Units  5,000 Units Subcutaneous Q8HRS Waylon Smith M.D.   5,000 Units at 07/11/21 0625   • norepinephrine (Levophed) 8 mg in 250 mL NS infusion (premix)  0-30 mcg/min Intravenous Continuous Waylon Smith M.D. 15 mL/hr at 07/10/21 1600 8 mcg/min at 07/10/21 1600   • MD Alert...Vancomycin per Pharmacy   Other PHARMACY TO DOSE Waylon Smith M.D.       • piperacillin-tazobactam (ZOSYN) 4.5 g in  mL IVPB  4.5 g Intravenous Q12HRS Waylon Smith M.D. 25 mL/hr at 07/11/21 0600 4.5 g at 07/11/21 0600   • epoetin (Retacrit) injection (Dialysis Use Only) 4,000 Units  4,000 Units Subcutaneous TUE+THU+SAT Kulwant Hodges M.D.   4,000 Units at 07/10/21 1108   • heparin injection 2,000 Units  2,000 Units Intravenous DIALYSIS PRN Kulwant Hodges M.D.   2,000 Units at 07/10/21  0946   • heparin injection 3,700 Units  3,700 Units Intracatheter DIALYSIS PRN Kulwant Hodges M.D.   3,700 Units at 07/10/21 1157   • gabapentin (NEURONTIN) capsule 300 mg  300 mg Oral TID James Mott D.BRIAN   300 mg at 07/11/21 0625   • atorvastatin (LIPITOR) tablet 40 mg  40 mg Oral QHS Fortino Tomlinson M.D.   40 mg at 07/10/21 2130   • calcitRIOL (ROCALTROL) capsule 0.25 mcg  0.25 mcg Oral DAILY Fortino Tomlinson M.D.   0.25 mcg at 07/11/21 0625   • clopidogrel (PLAVIX) tablet 75 mg  75 mg Oral DAILY Fortino Tomlinson M.D.   75 mg at 07/11/21 0625   • insulin glargine (Semglee) injection  10 Units Subcutaneous QHS Fortino Tomlinson M.D.   10 Units at 07/10/21 2100   • methimazole (TAPAZOLE) tablet 5 mg  5 mg Oral BID Fortino Tomlinson M.D.   5 mg at 07/11/21 0625   • omeprazole (PRILOSEC) capsule 20 mg  20 mg Oral BID Fortino Tomlinson M.D.   20 mg at 07/11/21 0625   • oxyCODONE-acetaminophen (PERCOCET) 5-325 MG per tablet 1 tablet  1 tablet Oral Q4HRS PRN Fortino Tomlinson M.D.   1 tablet at 07/08/21 1937   • sevelamer carbonate (RENVELA) tablet 1,600 mg  1,600 mg Oral TID WITH MEALS Fortino Tomlinson M.D.   1,600 mg at 07/11/21 0632   • tamsulosin (FLOMAX) capsule 0.8 mg  0.8 mg Oral QHS Fortino Tomlinson M.D.   0.8 mg at 07/10/21 2129   • traMADol (ULTRAM) 50 MG tablet 50 mg  50 mg Oral Q6HRS PRN Fortino Tomlinson M.D.   50 mg at 07/08/21 2059   • traZODone (DESYREL) tablet 50 mg  50 mg Oral QHS Fortino Tomlinson M.D.   50 mg at 07/10/21 2130   • senna-docusate (PERICOLACE or SENOKOT S) 8.6-50 MG per tablet 2 tablet  2 tablet Oral BID Fortino Tomlinson M.D.   2 tablet at 07/11/21 0625    And   • polyethylene glycol/lytes (MIRALAX) PACKET 1 Packet  1 Packet Oral QDAY PRN Fortino Tomlinson M.D.        And   • bisacodyl (DULCOLAX) suppository 10 mg  10 mg Rectal QDAY PRN Fortino Tomlinson M.D.       • guaiFENesin dextromethorphan (ROBITUSSIN DM) 100-10 MG/5ML syrup 10 mL  10 mL Oral Q6HRS PRN Fortino Tomlinson M.D.       • ondansetron (ZOFRAN) syringe/vial injection 4 mg  4 mg Intravenous Q4HRS  PRN Fortino Tomlinson M.D.       • ondansetron (ZOFRAN ODT) dispertab 4 mg  4 mg Oral Q4HRS PRN Fortino Tomlinson M.D.       • insulin regular (HumuLIN R,NovoLIN R) injection  1-6 Units Subcutaneous 4X/DAY OLINDA Tomlinson M.D.   1 Units at 07/10/21 1716    And   • glucose 4 g chewable tablet 16 g  16 g Oral Q15 MIN PRN Fortino Tomlinson M.D.        And   • dextrose 50% (D50W) injection 50 mL  50 mL Intravenous Q15 MIN PRN Fortino Tomlinson M.D.           Fluids    Intake/Output Summary (Last 24 hours) at 7/11/2021 0714  Last data filed at 7/11/2021 0600  Gross per 24 hour   Intake 1396.87 ml   Output 2950 ml   Net -1553.13 ml       Laboratory          Recent Labs     07/09/21  0203 07/10/21  1700 07/11/21  0507   SODIUM 137 136 135   POTASSIUM 4.5 4.1 4.6   CHLORIDE 99 99 98   CO2 25 25 27   BUN 21 16 21   CREATININE 3.43* 3.24* 3.97*   MAGNESIUM  --  1.8 1.9   CALCIUM 8.5 8.4* 8.7     Recent Labs     07/09/21  0203 07/10/21  1700 07/11/21  0507   GLUCOSE 96 165* 92     Recent Labs     07/08/21  1153 07/09/21  0203 07/10/21  1700 07/11/21  0507   WBC  --  7.2 7.5 7.3   NEUTSPOLYS  --  75.60*  --   --    LYMPHOCYTES  --  13.20*  --   --    MONOCYTES  --  9.80  --   --    EOSINOPHILS 2 0.00  --   --    BASOPHILS  --  0.60  --   --      Recent Labs     07/09/21  0203 07/10/21  1700 07/11/21  0507   RBC 2.72* 2.74* 2.67*   HEMOGLOBIN 8.5* 8.8* 8.3*   HEMATOCRIT 28.5* 28.0* 27.6*   PLATELETCT 330 311 321       Imaging  X-Ray:  I have personally reviewed the images and compared with prior images.    Assessment/Plan  * Septic shock (HCC)  Assessment & Plan  This is Septic shock Present on admission  SIRS criteria identified on my evaluation include: Tachycardia, with heart rate greater than 90 BPM and Tachypnea, with respirations greater than 20 per minute  Source is unknown   Presentation includes: hypotension after modified sepsis bolus  Despite appropriate fluid resuscitation with crystalloid given per sepsis guidelines, the patient remains  hypotensive with systolic blood pressure less than 90 or MAP less than 65  Hemodynamic support with additional fluids and IV vasopressors as needed to maintain a SBP of 90 or MAP of 65  IV antibiotics as appropriate for source of sepsis  Reassessment: I have reassessed the patient's hemodynamic status     Potential sources: L BKA site, line infection, L hand dry gangrene  Vancomycin + Zosyn  Pt has hx of ESBL Klebsiella in foot in the past. I to unasyn, S to piptazo.   NE titrated to MAP > 65  Follow cultures  Nasal MRSA screen   Xray of left hand. CRP, procalcitonin  May need MRI hand If xray is inconclusive.     Acute on chronic respiratory failure (HCC)- (present on admission)  Assessment & Plan  Acute respiratory insufficiency  Titrate supplemental O2 to maintain O2 saturation > 92%  Aggressive pulmonary hygiene  Encourage incentive spirometry    Pleural effusion- (present on admission)  Assessment & Plan  Cultures NGTD    Ischemia of finger- (present on admission)  Assessment & Plan  Dry gangrene of L hand  Warm to touch, erythematous  Empiric antibiotics  Wound care    Goals of care, counseling/discussion  Assessment & Plan  7/10 Dr. Gonda had extensive discussion with the patient regarding his condition.  We also called his wife, [place], on speaker phone.  He ultimately made the decision to have a short course of vasopressor administration to see if his BP improved.  He affirms his desire to be DNAR/DNI.    Paroxysmal atrial fibrillation ([facility])- (present on admission)  Assessment & Plan  Rate controlled  Optimize electrolytes  Home NOAC    End stage renal disease on dialysis ([facility])- (present on admission)  Assessment & Plan  Continue iHD per schedule  Monitor electrolytes and replete as needed    Type 2 diabetes mellitus with kidney complication, with long-term current use of insulin ([facility])- (present on admission)  Assessment & Plan  Goal glucose 140-180  SSI + LA    CAD (coronary artery disease)- (present on  admission)  Assessment & Plan  ASA + Statin        VTE:  Heparin  Ulcer: Not Indicated  Lines: Central Line  Ongoing indication addressed    I have performed a physical exam and reviewed and updated ROS and Plan today (7/11/2021). In review of yesterday's note (7/10/2021), there are no changes except as documented above.     Discussed patient condition and risk of morbidity and/or mortality with Hospitalist, RN, RT, Charge nurse / hot rounds and Patient  The patient remains critically ill.  Critical care time = 40 minutes in directly providing and coordinating critical care and extensive data review.  No time overlap and excludes procedures.

## 2021-07-11 NOTE — PROGRESS NOTES
"[facility] -  PROGRESS NOTE               Author: Cindi Stein D.O. Date & Time: 7/11/2021  9:45 AM     HPI:  76 y/o man with ESRD HD T,TH,SAt presented with SOB. Pt has reccurrent pleural effusion. Pt /sp thoracentisis in the past.  Pt with recent ligation of left arm AVF  Pt makes a small amount of urine    DAILY NEPHROLOGY SUMMARY:  7/9: HD yesterday 1 liter UF, but transferred to ICU for low BP. He is on pressors now  7/10: Pt sitting up in bed, comfortable, L IJ CVC placed yesterday, on 15 mcg levo gtt, completed iHD today, resp status stable  7/11: remains on pressors, tolerated HD, no new c/o     REVIEW OF SYSTEMS:    Review of Systems   Constitutional: Negative for fever.   Eyes: Negative for pain and discharge.   Respiratory: Negative for shortness of breath and wheezing.    Cardiovascular: Negative for chest pain and leg swelling.   Gastrointestinal: Negative for abdominal pain, diarrhea, nausea and vomiting.   Genitourinary: Negative for dysuria, frequency, hematuria and urgency.   Musculoskeletal: Negative for back pain and myalgias.   Neurological: Negative for dizziness and headaches.         PMH/PSH/SH/FH: Reviewed and unchanged since admission note  CURRENT MEDICATIONS: Reviewed from admission to present day    VS:  /41   Pulse 97   Temp 36.5 °C (97.7 °F) (Temporal)   Resp (!) 29   Ht 1.676 m (5' 6\")   Wt 74.3 kg (163 lb 12.8 oz)   SpO2 99%   BMI 26.44 kg/m²   Physical Exam  Vitals and nursing note reviewed.   Constitutional:       General: He is not in acute distress.  HENT:      Head: Normocephalic and atraumatic.      Mouth/Throat:      Mouth: Mucous membranes are dry.   Eyes:      General:         Right eye: No discharge.         Left eye: No discharge.      Extraocular Movements: Extraocular movements intact.      Conjunctiva/sclera: Conjunctivae normal.   Cardiovascular:      Rate and Rhythm: Normal rate and regular rhythm.      Heart sounds: Normal " heart sounds.   Pulmonary:      Effort: Pulmonary effort is normal.      Breath sounds: No rales.      Comments: Diminished t/o   Abdominal:      General: Abdomen is flat. There is no distension.      Palpations: Abdomen is soft.      Tenderness: There is no abdominal tenderness.   Musculoskeletal:      Cervical back: Normal range of motion and neck supple.      Right lower leg: No edema.      Left lower leg: No edema.      Comments: B/l BKA   Skin:     General: Skin is warm and dry.      Comments: Necrotic L fingers/dry gangrene   Neurological:      General: No focal deficit present.      Mental Status: He is alert and oriented to person, place, and time.   Psychiatric:         Mood and Affect: Mood normal.         Behavior: Behavior normal.         Thought Content: Thought content normal.         Judgment: Judgment normal.         Fluids:  In: 1396.9 [I.V.:345.3; Dialysis:500]  Out: 2950     LABS:  Recent Labs     07/09/21  0203 07/10/21  1700 07/11/21  0507   SODIUM 137 136 135   POTASSIUM 4.5 4.1 4.6   CHLORIDE 99 99 98   CO2 25 25 27   GLUCOSE 96 165* 92   BUN 21 16 21   CREATININE 3.43* 3.24* 3.97*   CALCIUM 8.5 8.4* 8.7     Available labs, imaging and clinical documentation reviewed.     IMPRESSION:  # ESRD, dependent on HD qTTS  - Etiology likely 2/2 vascular disease    # Hypotension, acute on chronic  - Currently requiring vasopressor therapy  - On 15 mg midodrine TID    # Anemia of CKD, below target   - ARMANDO with HD, goal Hgb 10-11    # PVD, severe  - On Plavix     # Acute on chronic respiratory failure, improved   - s/p R thora 7/8 with 2 L removed  - On supp O2 via NC    # HFrEF  - EF 30%     # CKD-MBD  - phos and Ca wnl  - On calcitriol and sevelamer     # Hyperthyroidism, on methimazole    # HLD, on statin therapy    # BPH, on tamsulosin    Plan:  Continue iHD qTTS/PRN  UF as able  Wean pressors as able  Continue midodrine  Maintain MAP >65 mmHg  Max dose gabapentin in ESRD is 300 mg/day  Dose all meds  for eGFR <15   Avoid nephrotoxins/NSAIDs  Renal/low Na/fluid restricted diet  Continue calcitriol, and sevelamer with meals  ARMANDO with HD, goal Hgb 10-11, dose increased  Transfuse PRN Hgb <7   Daily labs  Strict I/O      Pt is at high risk for further morbidity/mortality with poor overall prognosis       Other management per primary service

## 2021-07-11 NOTE — CARE PLAN
The patient is Stable - Low risk of patient condition declining or worsening         Progress made toward(s) clinical / shift goals:  Dialysis completed. O2 requirements decreased.     Patient is not progressing towards the following goals:  Requires levophed.  Problem: Knowledge Deficit - Standard  Goal: Patient and family/care givers will demonstrate understanding of plan of care, disease process/condition, diagnostic tests and medications  Outcome: Progressing  Note: Pt educated on importance of O2 administration. Encouraged to not remove NC. Pt verbalized understanding.      Problem: Skin Integrity  Goal: Skin integrity is maintained or improved  Outcome: Not Progressing  Note: New wound care order in place, wound care to left fingers provided.          Problem: Skin Integrity  Goal: Skin integrity is maintained or improved  Outcome: Not Progressing  Note: New wound care order in place, wound care to left fingers provided.

## 2021-07-11 NOTE — PROGRESS NOTES
Pharmacy Vancomycin Kinetics Note for 7/11/2021     75 y.o. male on Vancomycin day # 3     Vancomycin Indication (Two level/Trough based Dosing): Sepsis (goal trough 15-20)    Provider specified end date: 07/14/21    Active Antibiotics (From admission, onward)    Ordered     Ordering Provider       Sun Jul 11, 2021  2:42 PM    07/11/21 1442  vancomycin (VANCOCIN) 1,000 mg in  mL IVPB  (vancomycin (VANCOCIN) IV (LD + Maintenance))  ONCE      BEAU Cade Jul 9, 2021 10:48 AM    07/09/21 1048  MD Alert...Vancomycin per Pharmacy  PHARMACY TO DOSE     Question:  Indication(s) for vancomycin?  Answer:  Unknown source of infection    Waylon Smith M.D.    07/09/21 1048  piperacillin-tazobactam (ZOSYN) 4.5 g in  mL IVPB  (piperacillin-tazobactam (ZOSYN) IV (Extended-infusion) PANEL )  EVERY 12 HOURS      Waylon Smith M.D.          Dosing Weight: 75.8 kg (167 lb 1.7 oz)      Admission History: Admitted on 7/7/2021 for Pleural effusion [J90]  Pertinent history: Admitted for SOB, found to have pleural effusion as well as warm, tender BKA sites.  Concern for possible sepsis as cause of hypotension.    Allergies:     Mircera [methoxy polyethylene glycol-epoetin beta] and Other drug     Pertinent cultures to date:     Results     Procedure Component Value Units Date/Time    MRSA By PCR (Amp) [[id number]] Collected: 07/11/21 1208    Order Status: Completed Specimen: Respirate from Nares Updated: 07/11/21 1232    Narrative:      Collected By[facility][id number] [facility]    Fluid Culture W/Gram Stain (pleural fluid) [[id number]] Collected: 07/08/21 1153    Order Status: Completed Specimen: Body Fluid from Pleural Fluid Updated: 07/11/21 0724     Significant Indicator NEG     Source BF     Site PLEURAL FLUID     Culture Result No growth at 72 hours.     Gram Stain Result Rare WBCs.  No organisms seen.      Anaerobic Culture (pleural fluid) [[id number]] Collected: 07/08/21 1153    Order Status:  "Completed Specimen: Body Fluid from Pleural Fluid Updated: 07/11/21 0724     Significant Indicator NEG     Source BF     Site PLEURAL FLUID     Culture Result Culture in progress.    BLOOD CULTURE [[id number]] Collected: 07/09/21 1100    Order Status: Completed Specimen: Blood from Peripheral Updated: 07/10/21 0854     Significant Indicator NEG     Source BLD     Site PERIPHERAL     Culture Result No Growth  Note: Blood cultures are incubated for 5 days and  are monitored continuously.Positive blood cultures  are called to the RN and reported as soon as  they are identified.      Narrative:      Collected By:[id number] SUJATHA LEYVA  Per Hospital Policy: Only change Specimen Src: to \"Line\" if  specified by physician order.  Right Wrist    BLOOD CULTURE [[id number]] Collected: 07/09/21 1100    Order Status: Completed Specimen: Blood from Peripheral Updated: 07/10/21 0854     Significant Indicator NEG     Source BLD     Site PERIPHERAL     Culture Result No Growth  Note: Blood cultures are incubated for 5 days and  are monitored continuously.Positive blood cultures  are called to the RN and reported as soon as  they are identified.      Narrative:      Collected By:[id number] SUJATHA LEYVA  Per Hospital Policy: Only change Specimen Src: to \"Line\" if  specified by physician order.  Left Hand    URINALYSIS [[id number]]     Order Status: No result Specimen: Urine     GRAM STAIN [[id number]] Collected: 07/08/21 1153    Order Status: Completed Specimen: Body Fluid Updated: 07/08/21 1928     Significant Indicator .     Source BF     Site PLEURAL FLUID     Gram Stain Result Rare WBCs.  No organisms seen.      Fluid Culture W/Gram Stain (pleural fluid) [[id number]]     Order Status: Canceled Specimen: Body Fluid from Pleural Fluid     Anaerobic Culture (pleural fluid) [[id number]]     Order Status: Canceled Specimen: Body Fluid from Pleural Fluid           Labs:     Estimated Creatinine Clearance: 14.1 mL/min (A) (by C-G " "formula based on SCr of 3.97 mg/dL (H)).  Recent Labs     21  0203 07/10/21  1700 21  0507   WBC 7.2 7.5 7.3   NEUTSPOLYS 75.60*  --   --      Recent Labs     21  0203 07/10/21  1700 21  0507   BUN 21 16 21   CREATININE 3.43* 3.24* 3.97*       Intake/Output Summary (Last 24 hours) at 2021 1443  Last data filed at 2021 1200  Gross per 24 hour   Intake 724.31 ml   Output 1050 ml   Net -325.69 ml      /40   Pulse 85   Temp 36.3 °C (97.4 °F) (Temporal)   Resp (!) 30   Ht 1.676 m (5' 6\")   Wt 74.3 kg (163 lb 12.8 oz)   SpO2 96%  Temp (24hrs), Av.5 °C (97.7 °F), Min:36.2 °C (97.2 °F), Max:36.6 °C (97.9 °F)      List concerns for Vancomycin clearance:     Age;ESRD;Pressors/Hypotension;Nephrotoxic drugs    Pharmacokinetics:     Trough kinetics:   Recent Labs     21  0507   VANCORANDOM 17.6       A/P:     -  Vancomycin dose: one time pulse dose of 1000 mg    -  Next vancomycin level(s):    - /13 with AM labs if therapy continues, not yet ordered    -  Comments: MRSA nares ordered and in process. Trough obtained, pulse dose given.    Sujata Palmer, PharmD, BCCCP  "

## 2021-07-11 NOTE — CONSULTS
LIMB PRESERVATION SERVICE CONSULT      REFERRED BY: Waylon Smith MD    DATE OF CONSULTATION: 7/11/2021    REASON FOR CONSULT: Left BKA     HISTORY OF PRESENT ILLNESS: Luis Sepulveda is a 75 y.o.  with a past medical history that includes type 2 diabetes, ESRD on hemodialysis, PAD, atrial fibrillation, HFrEF, hyperlipidemia, right BKA(Dr. Lynn 12/2020), left BKA (Dr. Márquez 5/2021) admitted 7/7/2021 for Pleural effusion [J90].   LPS has been consulted for evaluation of left BKA.  Patient is well-known to LPS from previous admissions.  During his admission on 5/20/2021-6/6/2021, patient was seen for necrotic left fourth toe, determined not to be a vascular candidate per vascular surgery, and subsequently underwent a left BKA on 5/28/2021 with Dr. Márquez.  Patient was discharged to skilled nursing facility on 6/6/2021.  He was readmitted to hospital on 6/18/2021-6/28/2021 due to fall.  [facility] where he states that they have been providing incisional care but he has not followed up with orthopedics postoperatively.  He does report 4/10 pain to his left distal stump but that it is tolerable/manageable.  Otherwise he denies swelling, redness, drainage from left BKA.  He states that he has not been able to bear weight on his left BKA due to the presence of sutures and dry eschar.  Denies fever, chills, nausea, vomiting, chest pain, headache, dizziness.    IV antibiotics were started on 7/9/2021.  Infectious diseases has not been consulted.    Xray of left BKA was not completed to assess for osteomyelitis  Ortho has not been consulted yet.    Patient was diagnosed with diabetes in 2000 and is currently managing with insulin.  Patient states his blood sugars are checked 1 time a day and that they typically average 120-140.  Patient has had previous diabetic education.  Patient has had previous prosthetic for his right BKA from [facility].  Has had previous foot surgeries including  bilateral BKA's, left anterior tibial artery angioplasty, percutaneous thrombectomy of left peroneal artery, left peroneal artery angioplasty.  Patient does not currently work.  Patient has been residing in a skilled nursing facility.      Smoking:   reports that he quit smoking about 7 years ago. His smoking use included cigarettes. He has a 55.00 pack-year smoking history. He has never used smokeless tobacco.    Alcohol:   reports previous alcohol use.    Drug:   reports no history of drug use.      PAST MEDICAL HISTORY:   Past Medical History:   Diagnosis Date   • Arrhythmia     hx a fib   • Arthritis 12/29/2020    knees, ankles, back   • CAD (coronary artery disease)     stents   • Chronic anticoagulation 3/26/2020   • Chronic kidney disease (CKD), stage V (HCC)    • Congestive heart failure (HCC)     hx of   • Dental disorder 12/29/2020    partial upper   • Diabetes    • Hemodialysis patient (HCC)     Tuesday, Thursday, Saturday   • Hypertension    • Hypotension 11/28/2019   • Kidney failure    • Myocardial infarct (HCC)     ? 2019    • Osteoarthritis    • Peripheral neuropathy    • Pneumonia     per hx   • Sleep apnea     does not use cpap anymore        PAST SURGICAL HISTORY:   Past Surgical History:   Procedure Laterality Date   • AV FISTULA REVISION Left 6/23/2021    Procedure: UPPER EXTREMITY, VENOGRAM;  Surgeon: John Nguyen M.D.;  Location: [facility];  Service: Vascular   • VEIN LIGATION Left 6/23/2021    Procedure: LIGATION, VEIN;  Surgeon: John Nguyen M.D.;  Location: [facility];  Service: Vascular   • CATH PLACEMENT Left 6/23/2021    Procedure: INSERTION, CATHETER -  PERMA CATH;  Surgeon: John Nguyen M.D.;  Location: [facility];  Service: Vascular   • KNEE AMPUTATION BELOW Left 5/28/2021    Procedure: AMPUTATION, BELOW KNEE.;  Surgeon: Jarett Márquez M.D.;  Location: [facility];  Service: Orthopedics   • KNEE AMPUTATION BELOW Right  12/31/2020    Procedure: AMPUTATION, BELOW KNEE ...;  Surgeon: Leobardo Lynn M.D.;  Location: [facility];  Service: Orthopedics   • TOE AMPUTATION Right 11/16/2020    Procedure: AMPUTATION, TOE GREAT;  Surgeon: Leobardo Lynn M.D.;  Location: [facility];  Service: Orthopedics   • TOE AMPUTATION Left 5/17/2020    Procedure: AMPUTATION, TOE GREAT;  Surgeon: Leobardo Lynn M.D.;  Location: SUR[facility];  Service: Orthopedics   • TENOTOMY Left 5/17/2020    Procedure: FLEXOR TENOTOMIES, TWO-FIVE TOES;  Surgeon: Leobardo Lynn M.D.;  Location: SURGERY [facility];  Service: Orthopedics   • APPENDECTOMY     • OTHER CARDIAC SURGERY      stents x1   • OTHER ORTHOPEDIC SURGERY      knee surgery       MEDICATIONS:   Current Facility-Administered Medications   Medication Dose   • traMADol (ULTRAM) 50 MG tablet 50 mg  50 mg   • acetaminophen (TYLENOL) tablet 500 mg  500 mg   • [START ON 7/13/2021] epoetin (Retacrit) injection (Dialysis Use Only) 6,000 Units  6,000 Units   • vancomycin (VANCOCIN) 1,000 mg in  mL IVPB  15 mg/kg   • midodrine (PROAMATINE) tablet 15 mg  15 mg   • heparin injection 5,000 Units  5,000 Units   • norepinephrine (Levophed) 8 mg in 250 mL NS infusion (premix)  0-30 mcg/min   • MD Alert...Vancomycin per Pharmacy     • piperacillin-tazobactam (ZOSYN) 4.5 g in  mL IVPB  4.5 g   • heparin injection 2,000 Units  2,000 Units   • heparin injection 3,700 Units  3,700 Units   • gabapentin (NEURONTIN) capsule 300 mg  300 mg   • atorvastatin (LIPITOR) tablet 40 mg  40 mg   • calcitRIOL (ROCALTROL) capsule 0.25 mcg  0.25 mcg   • clopidogrel (PLAVIX) tablet 75 mg  75 mg   • insulin glargine (Semglee) injection  10 Units   • methimazole (TAPAZOLE) tablet 5 mg  5 mg   • omeprazole (PRILOSEC) capsule 20 mg  20 mg   • sevelamer carbonate (RENVELA) tablet 1,600 mg  1,600 mg   • tamsulosin (FLOMAX) capsule 0.8 mg  0.8 mg   • traZODone (DESYREL) tablet 50 mg  50 mg   •  "senna-docusate (PERICOLACE or SENOKOT S) 8.6-50 MG per tablet 2 tablet  2 tablet    And   • polyethylene glycol/lytes (MIRALAX) PACKET 1 Packet  1 Packet    And   • bisacodyl (DULCOLAX) suppository 10 mg  10 mg   • guaiFENesin dextromethorphan (ROBITUSSIN DM) 100-10 MG/5ML syrup 10 mL  10 mL   • ondansetron (ZOFRAN) syringe/vial injection 4 mg  4 mg   • ondansetron (ZOFRAN ODT) dispertab 4 mg  4 mg   • insulin regular (HumuLIN R,NovoLIN R) injection  1-6 Units    And   • glucose 4 g chewable tablet 16 g  16 g    And   • dextrose 50% (D50W) injection 50 mL  50 mL       ALLERGIES:    Allergies   Allergen Reactions   • Mircera [Methoxy Polyethylene Glycol-Epoetin Beta] Hives   • Other Drug Unspecified     \"Opioids\" caused hallucinations        FAMILY HISTORY:   Family History   Problem Relation Age of Onset   • Heart Disease Mother    • Alcohol/Drug Father    • Thyroid Son    • Diabetes Brother    • Hypertension Neg Hx    • Hyperlipidemia Neg Hx          REVIEW OF SYSTEMS:   Constitutional: Negative for chills, fever   Respiratory: Positive for cough and shortness of breath.    Cardiovascular:Negative for chest pain, and claudication.   Gastrointestinal: Negative for constipation, diarrhea, nausea and vomiting.   Lower extremities: positive for bilateral BKA's, negative swelling and redness  Neurological: Negative for numbness to feet and lower legs  All other systems reviewed and are negative     RESULTS:     Recent Labs     07/09/21  0203 07/10/21  1700 07/11/21  0507   WBC 7.2 7.5 7.3   RBC 2.72* 2.74* 2.67*   HEMOGLOBIN 8.5* 8.8* 8.3*   HEMATOCRIT 28.5* 28.0* 27.6*   .8* 102.2* 103.4*   MCH 31.3 32.1 31.1   MCHC 29.8* 31.4* 30.1*   RDW 56.2* 56.4* 55.4*   PLATELETCT 330 311 321   MPV 9.3 8.5* 8.9*     Recent Labs     07/09/21  0203 07/10/21  1700 07/11/21  0507   SODIUM 137 136 135   POTASSIUM 4.5 4.1 4.6   CHLORIDE 99 99 98   CO2 25 25 27   GLUCOSE 96 165* 92   BUN 21 16 21         ESR:         CRP:       "   Results from last 7 days   Lab Units 07/11/21  1208   C REACTIVE PROTEIN 4596 mg/dL 11.20*         COVID-19: None this admission    X-ray: None of left BKA    MRI: None this admission    Arterial studies:    US-extremity artery lower bilateral 5/21/2021          RIGHT   Waveform        Peak Systolic Velocity (cm/s)                   Prox    Prox-Mid  Mid    Mid-Dist  Distal   Triphasic                         52                       CFA         Biphasic        55                                         PFA         Biphasic        44                65      24       56      SFA         Biphasic                          35                       POP         Monophasic      62                                         AT         Monophasic      51                                         PT         Not                                                        LISA   attained                    LEFT   Waveform        Peak Systolic Velocity (cm/s)                   Prox    Prox-Mid  Mid    Mid-Dist  Distal   Triphasic                         132                      CFA         Biphasic        43                                         PFA         Biphasic        50                62               42      SFA         Triphasic                         56                       POP         Monophasic      48                                 70      AT         Monophasic      37                                 30      PT         Absent          0                                  54      LISA               FINDINGS   Right-   Calcific atherosclerotic plaque seen throughout the lower extremity.    Stenosis of the distal superficial femoral artery. Velocities are    consistent with 50-75% stenosis.    Proximal anterior and posterior tibial artery waveforms are monophasic.    Proximal peroneal artery was not visualized.       Left-   Calcific atherosclerotic plaque seen throughout the lower extremity.   Monophasic waveforms seen in  "the anterior and posterior tibial arteries.   Absent flow seen in the proximal peroneal artery.    A1c:  Lab Results   Component Value Date/Time    HBA1C 5.7 (H) 05/11/2021 06:30 AM            Microbiology:  Results     Procedure Component Value Units Date/Time    MRSA By PCR (Amp) [[id number]] Collected: 07/11/21 1208    Order Status: Completed Specimen: Respirate from Nares Updated: 07/11/21 1232    Narrative:      Collected By:[id number] [facility]    Fluid Culture W/Gram Stain (pleural fluid) [[id number]] Collected: 07/08/21 1153    Order Status: Completed Specimen: Body Fluid from Pleural Fluid Updated: 07/11/21 0724     Significant Indicator NEG     Source BF     Site PLEURAL FLUID     Culture Result No growth at 72 hours.     Gram Stain Result Rare WBCs.  No organisms seen.      Anaerobic Culture (pleural fluid) [[id number]] Collected: 07/08/21 1153    Order Status: Completed Specimen: Body Fluid from Pleural Fluid Updated: 07/11/21 0724     Significant Indicator NEG     Source BF     Site PLEURAL FLUID     Culture Result Culture in progress.    BLOOD CULTURE [[id number]] Collected: 07/09/21 1100    Order Status: Completed Specimen: Blood from Peripheral Updated: 07/10/21 0854     Significant Indicator NEG     Source BLD     Site PERIPHERAL     Culture Result No Growth  Note: Blood cultures are incubated for 5 days and  are monitored continuously.Positive blood cultures  are called to the RN and reported as soon as  they are identified.      Narrative:      Collected By:[id number] SUJATHA LEYVA  Per Hospital Policy: Only change Specimen Src: to \"Line\" if  specified by physician order.  Right Wrist    BLOOD CULTURE [[id number]] Collected: 07/09/21 1100    Order Status: Completed Specimen: Blood from Peripheral Updated: 07/10/21 0854     Significant Indicator NEG     Source BLD     Site PERIPHERAL     Culture Result No Growth  Note: Blood cultures are incubated for 5 days and  are monitored " "continuously.Positive blood cultures  are called to the RN and reported as soon as  they are identified.      Narrative:      Collected By:[id number] SUJATHA LEYVA  Per Hospital Policy: Only change Specimen Src: to \"Line\" if  specified by physician order.  Left Hand    URINALYSIS [[id number]]     Order Status: No result Specimen: Urine     GRAM STAIN [[id number]] Collected: 07/08/21 1153    Order Status: Completed Specimen: Body Fluid Updated: 07/08/21 1928     Significant Indicator .     Source BF     Site PLEURAL FLUID     Gram Stain Result Rare WBCs.  No organisms seen.      Fluid Culture W/Gram Stain (pleural fluid) [[id number]]     Order Status: Canceled Specimen: Body Fluid from Pleural Fluid     Anaerobic Culture (pleural fluid) [[id number]]     Order Status: Canceled Specimen: Body Fluid from Pleural Fluid            PHYSICAL EXAMINATION:     VITAL SIGNS: /40   Pulse 85   Temp 36.3 °C (97.4 °F) (Temporal)   Resp (!) 30   Ht 1.676 m (5' 6\")   Wt 74.3 kg (163 lb 12.8 oz)   SpO2 96%   BMI 26.44 kg/m²       General Appearance:  Well developed, well nourished, elderly male in no acute distress      Lower Extremity Assessment:    Edema:   Minimal edema left lower extremity      Structural /mechanical changes:  Bilateral BKA's    Sensory Assessment  Sensory intact to bilateral BKA stumps      Pulses:  R foot: +1 popliteal pulse  L foot: +1 popliteal pulse      Wound Assessment:    Left BKA  Left BKA well approximated with sutures.  Sutures were removed and incision remained approximated.   There is dry stable eschar noted throughout incision.  Very minimal erythema, likely related to inflammation and healing process.  Minimal edema distal stump.  Mild neuropathic pain elicited with palpation to distal anterior aspect of stump.  Otherwise no other tenderness with palpation.  No fluctuance or drainage.    Left BKA lateral    Left BKA distal medial            Wound care completed by APRN.  Sutures " removed.  Left BKA left open air      ASSESSMENT AND PLAN:   This is a 75-year-old male with a past medical history including type 2 diabetes, PAD, HFrEF 15-20%, recent pneumonia with pleural effusion, atrial fibrillation on Eliquis, s/p bilateral BKA's admitted for pleural effusion and volume overload.  Left BKA stump to be healing well.  Dry stable eschar throughout incision.  Remains intact with suture removal.  Mild erythema and edema to left BKA stump, likely related to healing process.  Does not appear to be infectious.  No fluctuance, drainage, odor otherwise noted.  May leave open to air.  APRN will contact [facility] to provide  sock.  Patient is continue to be nonweightbearing of left BKA stump until eschar tissue slough removed and reveals healthy, stable tissue underneath.      Wound care:   Left BKA: May leave open air.    Imaging/Labs:  -COVID-19: N/A    Vascular status:   -Palpable bilateral popliteal pulses    Surgery:   -None    Antibiotics:   -currently on antibiotics managed by hospitalist       Weight Bearing Status:   -Weight bearing as tolerated right BKA  Nonweightbearing left BKA    Offloading:   -None  -Orthotic company: [employer]    PT/OT:   -consult placed by hospitalist, seen by PT 7/9/2021      Diabetes Education:   None at this time.  Hemoglobin A1c 5.7 on 5/11/2021    - Implications of loss of protective sensation (LOPS) discussed with patient- including increased risk for amputation.  Advised to check feet at least daily, moisturize feet, and to always wear protective foot wear.   -avoid trimming own nails. See podiatrist or certified foot and nail RN  -keep blood sugars <150 for improved wound healing        D/W: pt, RN, Dr. Cheung      Disposition:   Patient made inpatient for ongoing medical care.  Clear from LPS standpoint for discharge.  LPS to follow along for any assistance as needed while patient admitted.    Follow up: Orthopedics.        Please note that this dictation was  created using voice recognition software. I have  worked with technical experts from [facility] to optimize the interface.  I have made every reasonable attempt to correct obvious errors, but there may be errors of grammar and possibly content that I did not discover before finalizing the note.    Please contact LPS through [employer].

## 2021-07-11 NOTE — CARE PLAN
Problem: Fall Risk  Goal: Patient will remain free from falls  Outcome: Progressing     Problem: Knowledge Deficit - Standard  Goal: Patient and family/care givers will demonstrate understanding of plan of care, disease process/condition, diagnostic tests and medications  Outcome: Progressing     Problem: Pain - Standard  Goal: Alleviation of pain or a reduction in pain to the patient’s comfort goal  Outcome: Progressing     Problem: Skin Integrity  Goal: Skin integrity is maintained or improved  Outcome: Progressing       Progress made toward(s) clinical / shift goals: Patient is alert and oriented and is on a Levo Gtt for hypotension. Patient does not seem to have any pain at this time. Skin is being monitored and wound care is following.

## 2021-07-12 NOTE — PROGRESS NOTES
"[facility] -  PROGRESS NOTE               Author: Mario Hunter M.D. Date & Time: 7/12/2021  8:31 AM     HPI:  74 y/o man with ESRD HD T,TH,SAt presented with SOB. Pt has reccurrent pleural effusion. Pt /sp thoracentisis in the past.  Pt with recent ligation of left arm AVF  Pt makes a small amount of urine    DAILY NEPHROLOGY SUMMARY:  7/9: HD yesterday 1 liter UF, but transferred to ICU for low BP. He is on pressors now  7/10: Pt sitting up in bed, comfortable, L IJ CVC placed yesterday, on 15 mcg levo gtt, completed iHD today, resp status stable  7/11: remains on pressors, tolerated HD, no new c/o   7/12:  Remains on pressors, long discussion regarding GOC, overall malaise     REVIEW OF SYSTEMS:    10 point ROS performed, negative other than statedf above    PMH/PSH/SH/FH: Reviewed and unchanged since admission note  CURRENT MEDICATIONS: Reviewed from admission to present day    VS:  BP (!) 84/45   Pulse 93   Temp 36.5 °C (97.7 °F) (Temporal)   Resp 17   Ht 1.676 m (5' 6\")   Wt 74.5 kg (164 lb 3.9 oz)   SpO2 95%   BMI 25.78 kg/m²   Physical Exam  Vitals and nursing note reviewed.   Constitutional:       General: He is not in acute distress.  HENT:      Head: Normocephalic and atraumatic.      Mouth/Throat:      Mouth: Mucous membranes are dry.   Eyes:      General:         Right eye: No discharge.         Left eye: No discharge.      Extraocular Movements: Extraocular movements intact.      Conjunctiva/sclera: Conjunctivae normal.   Cardiovascular:      Rate and Rhythm: Normal rate and regular rhythm.      Heart sounds: Normal heart sounds.   Pulmonary:      Effort: Pulmonary effort is normal.      Breath sounds: No rales.      Comments: Diminished t/o   Abdominal:      General: Abdomen is flat. There is no distension.      Palpations: Abdomen is soft.      Tenderness: There is no abdominal tenderness.   Musculoskeletal:      Cervical back: Normal range of motion and neck " supple.      Right lower leg: No edema.      Left lower leg: No edema.      Comments: B/l BKA   Skin:     General: Skin is warm and dry.      Comments: Necrotic L fingers/dry gangrene   Neurological:      General: No focal deficit present.      Mental Status: He is alert and oriented to person, place, and time.   Psychiatric:         Mood and Affect: Mood normal.         Behavior: Behavior normal.         Thought Content: Thought content normal.         Judgment: Judgment normal.         Fluids:  In: 1474.6 [P.O.:860; I.V.:364.6]  Out: 0     LABS:  Recent Labs     07/10/21  1700 07/11/21  0507 07/12/21  0440   SODIUM 136 135 133*   POTASSIUM 4.1 4.6 4.8   CHLORIDE 99 98 98   CO2 25 27 28   GLUCOSE 165* 92 93   BUN 16 21 32*   CREATININE 3.24* 3.97* 5.05*   CALCIUM 8.4* 8.7 9.0     Available labs, imaging and clinical documentation reviewed.     IMPRESSION:  # ESRD, dependent on HD qTTS    # Hypotension, acute on chronic  - Currently requiring vasopressor therapy  - On 15 mg midodrine TID    # Anemia of CKD, below target   - ARMANDO with HD, goal Hgb 10-11    # PVD, severe  - On Plavix     # Acute on chronic respiratory failure, improved   - s/p R thora 7/8 with 2 L removed  - On supp O2 via NC    # HFrEF  - EF 30%     # CKD-MBD  - phos and Ca wnl  - On calcitriol and sevelamer     # Hyperthyroidism, on methimazole    # HLD, on statin therapy    # BPH, on tamsulosin    Plan:  -No HD today, Continue iHD qTTS/PRN  ongoing GOC discussions, would  Continue to involve palliative   -UF as able  -Continue midodrine  -Maintain MAP >65 mmHg  -Max dose gabapentin in ESRD is 300 mg/day  -Dose all meds for eGFR <15   -Avoid nephrotoxins/NSAIDs  -Renal/low Na/fluid restricted diet  -Continue calcitriol, and sevelamer with meals  -ARMANDO with HD, goal Hgb 10-11, dose increased  -Transfuse PRN Hgb <7   -Daily labs  -Strict I/O    Pt is at high risk for further morbidity/mortality with poor overall prognosis     Thank you

## 2021-07-12 NOTE — THERAPY
Speech Language Pathology   Fiberoptic Endoscopic Evaluation of Swallow     Patient Name: Luis Sepulveda  AGE:  75 y.o., SEX:  male  Medical Record #: [id number]  Today's Date: 7/12/2021     Precautions  Precautions: Fall Risk, Swallow Precautions ( See Comments)  Comments: gaby BRANNON, hypotensive    Assessment  Patient seen this date for FEES procedure. Discussed with the patient the risks, benefits, and alternatives of the FEES procedure. Patient acknowledges and agreeable to proceed with the procedure. Scope introduced into patient's left nare and passed without difficulty. Upon insertion of scope into pharynx, patient was noted to have minimal amount of yellowish coating on BoT, but suspect this was mucous, as it cleared as study progressed. Patient was administered PO trials of single ice chips, MTL via tsp, cup sip, and straw, purees, pudding, soft solids, and thin liquids via tsp and cup sip.     Overall, patient presented with moderate-severe oropharyngeal dysphagia, evidenced by weak BoT retraction, decreased laryngeal elevation, decreased pharyngeal constriction, laryngeal mistiming, and decreased sensation. Patient had moderate amount of diffuse residue post-swallow on purees and pudding that eventually cleared with multiple swallows and/or MTL wash. Patient had intermittent penetration before the swallow and after the swallow on residue from purees and pudding. On soft solids, patient had one single diced peach noted to be in his vallecula and then later moved to his right pyriform sinus after the swallow for >3.5 minutes despite multiple swallows, effortful swallow, right head turn, and MTL wash. Eventually another bite of applesauce cleared peach residue. Patient also had questionable aspiration after the swallow on small piece of soft solids, but I was unable to determine if this was soft solids vs. Small chunk of yellow mucous; this aspirate never appeared to be coughed back out of trachea.  Patient had penetration before and after the swallow on residue from thin liquids via tsp and cup sip. Patient was not sensate to residue, penetration, or possible aspiration event and had to be cued to cough, swallow again, clear throat, etc.     1.) At this time, patient remains at risk for aspiration, but will initiate modified diet of LQ3/MT2 diet (liquidized/mildly thick) w/ assistance as needed. Crush meds in puree. Small straw sips ok. Please remind patient to swallow 2-3 times per bite/sip. Video reviewed with patient and patient agreeable, but requested to discuss his goals of care.  2.) Recommend palliative care consult to further determine patient's wishes and POC.     Plan  Recommend Speech Therapy 3 times per week until therapy goals are met for the following treatments:  Dysphagia Training and Patient / Family / Caregiver Education.    Discharge Recommendations: Recommend post-acute placement for additional speech therapy services prior to discharge home     Objective     07/12/21 1455   Precautions   Precautions Fall Risk;Swallow Precautions ( See Comments)   Vitals   O2 (LPM) 3   O2 Delivery Device Silicone Nasal Cannula   FEES  Anatomy Assessment   Anatomy For A Functional Swallow:   (WNL)   FEES Laryngeal Adduction Assessment   Breath Holding Adequate   Clearing Throat Adequate   Cough Adequate   Phonation Adequate   Onset Of Swallow Adequate   FEES Velopharyngeal Assessment    Velopharyngeal Closure: Adequate   FEES Voice Assessment    Voice:   (Minimally gravely )   FEES Sensation Assessment    Presence of Scope Adequate   Presence of Residue Inadequate   Presence of Penetration Absent Response   Presence of Aspiration No Aspiration   FEES Swallow Function Assessment   Swallow Trigger Impaired;Level of the Valleculae;Level of the Pyriform Sinuses   Base of Tongue Retraction Impaired   Pharyngeal Constrictor Movement Impaired   White Out Phase Complete White Out   Epiglottic Movement Functional    Laryngeal Elevation Impaired   Cricopharyngeal Function Functional   Swallow Observations / Symptoms   Swallow Observations / Symptoms Yes   Vallecular Residue Liquidised (3);Pureed (4);Soft & Bite-Sized (6) - (Dysphagia III);Thin (0)   Thin (0) Teaspoon;Cup   Pyriform Sinus Residue Soft & Bite-Sized (6) - (Dysphagia III);Thin (0)   Thin (0) Cup   Posterior Pharyngeal Wall Residue Thin (0);Pureed (4)   Thin (0) Cup;Teaspoon   Penetration Before the Swallow Thin (0);Liquidised (3);Pureed (4)   Thin (0) Teaspoon;Cup   Penetration After the Swallow Liquidised (3);Pureed (4);Soft & Bite-Sized (6) - (Dysphagia III);Thin (0)   Thin (0) Cup   Aspiration After the Swallow Soft & Bite-Sized (6) - (Dysphagia III)  (Suspect small chunk of fruit vs small chunk of mucous?)   Compensatory Strategies Attempted   Compensatory Strategies Attempted Yes   Right Head Turn Did not clear soft solid residue    Multiple Swallows Minimally effective d/t weakness   Effortful Swallows Minimally effective d/t weakness    Cough / Clear After Swallow Intermittently effective in clearing residue   Liquid Wash After Swallow Effective in clearing residue    Patient Ability To Protect Airway   Patient Ability To Protect Airway  Inconsistent   Penetration Aspiration Scale   Penetration Aspiration Scale 8 - Material passes glottis and is not ejected, visible subglottic stasis, absent patient response  (Unknown if fruit vs. mucous; never seen coughed out )   [place] Pharyngeal Residue Severity   Vallecular Residue Severe, filled to epiglottic rim   Pyriform Sinus Residue  Mild, up wall to ¼ full   Dysphagia Rating   Nutritional Liquid Intake Rating Scale Thickened beverages (mildly thick unless otherwise specified)   Nutritional Food Intake Rating Scale Total oral diet of a single consistency   Evaluation Recommendations   Swallow Evaluations Recommendations (Yes / No) Yes   Diet / Liquid Recommendation Liquidised (3) - (Nectar Thick Full Liquid);Mildly  Thick (2) - (Nectar Thick)   Medication Administration  Crush all Medications in Puree   Evaluation Recommended Techniques   Swallow Techniques Yes   Techniques Pharyngeal Phase Thicken Liquids For Better Control Of Bolus To Consistency Of Nectar;Repeat Swallow 2-3 Times On Each Bolus To Clear Residue;Effortful Swallow;Pacing:Control Amount Of Presentation;Pacing:Control Rate Of Presentation;Alternate Liquids / Solids Consistency To Wash Foods Through Pharynx;Discourage Talking While Eating;Clear Throat If Wet Vocal Quality   Short Term Goals   Short Term Goal # 1 Patient will consume prefeeding trials with SLP only with no overt s/sx of aspiration.    Goal Outcome # 1 Goal met, new goal added   Short Term Goal # 1 B  NEW after FEES: Patient will consume LQ3/MT2 diet with assistance with no overt s/sx of aspiration.    Anticipated Discharge Needs   Discharge Recommendations Recommend post-acute placement for additional speech therapy services prior to discharge home

## 2021-07-12 NOTE — HEART FAILURE PROGRAM
Plan is still to go to SNF. Appointment with the HF Clinic in August is appropriate for now.     Aug 06, 2021 10:30 AM   Heart Failure Established Patient with Tito Amin M.D.   [facility] (--) [address], [address]   [facility] [address]   [phone]     Thank you, [place], Cardio RN Navigator [id number]

## 2021-07-12 NOTE — THERAPY
"Speech Language Pathology   Clinical Swallow Evaluation     Patient Name: Luis Sepulveda  AGE:  75 y.o., SEX:  male  Medical Record #: [id number]  Today's Date: 7/12/2021     Precautions  Precautions: Fall Risk, Swallow Precautions ( See Comments)  Comments: gaby BRANNON, hypotensive    Assessment  Pt admitted 7/7 with SOB and cough. Found to have pleural effusion and septic shock. S/p thoracentesis. PMHx of CAD w/ stent, systolic heart failure w/ EF30%, DM, ESRD on hemodialysis T,Th,Sat, pneumonia. Last seen by SLP 5/30/21 with recs for reg/thins. Had MBS in 2019 with recs for reg/thins w/ strategies if needed d/t C3-4 osteophytes. CXR today reports \"Improving moderate right pleural effusion.    Patient seen this date for clinical swallow evaluation. Patient on 2gram sodium diet, but per RN, was noted to have increased coughing with PO intake recently and coughing with thins this morning, causing concern for aspiration. Patient's oral motor evaluation was WNL with the exception of upper partial denture plate. Patient's vocal quality minimally gravely. Patient consumed PO trials of single ice chips, MTL via tsp and cup sip, purees, soft solids, mixed consistencies, crackers, and thins via cup sip and straw. Patient had intermittent coughing throughout study, including after ice chips, soft solids, and crackers, which is concerning for penetration/aspiration. Laryngeal elevation palpated as weak. Initiation of swallow trigger was timely.     Recommend patient downgrade to NPO pending FEES today to further evaluate oropharyngeal swallow function and r/o aspiration. Patient aware and in agreement. SLP to follow.     Plan  Recommend Speech Therapy 3 times per week until therapy goals are met for the following treatments:  Dysphagia Training and Patient / Family / Caregiver Education.    Discharge Recommendations: Recommend post-acute placement for additional speech therapy services prior to discharge home   "   Objective     07/12/21 0952   Precautions   Precautions Fall Risk;Swallow Precautions ( See Comments)   Vitals   O2 (LPM) 3   O2 Delivery Device Silicone Nasal Cannula   Oral Motor Eval    Is Patient Able to Complete Oral Motor Eval Yes, Within Normal Limits   Laryngeal Function   Voice Quality Minimal  (Slightly gravely )   Volutional Cough Within Functional Limits   Excursion Upon Swallow Weak    Max Phonation Time (Seconds) 10   Oral Food Presentation   Ice Chips Minimal   Single Swallow Mildly Thick (2) - (Nectar Thick)  Within Functional Limits   Serial Swallow Mildly Thick (2) - (Nectar Thick) Within Functional Limits   Single Swallow Thin (0) Within Functional Limits   Serial Swallow Thin (0) Within Functional Limits   Liquidised (3) Within Functional Limits   Pureed (4) Within Functional Limits   Soft & Bite-Sized (6) - (Dysphagia III) Moderate   Regular (7) Within Functional Limits   Regular-Easy to Chew (7) Moderate   Self Feeding Needs Assistance   Dysphagia Strategies / Recommendations   Strategies / Interventions Recommended (Yes / No) Yes   Compensatory Strategies To Be Assessed   Diet / Liquid Recommendation NPO;Pre-Feeding Trials with SLP Only   Medication Administration  Other (See Comments)  (Per MD, pending FEES today )   Therapy Interventions FEES Evaluation;Dysphagia Therapy By Speech Language Pathologist   Short Term Goals   Short Term Goal # 1 Patient will consume prefeeding trials with SLP only with no overt s/sx of aspiration.    Anticipated Discharge Needs   Discharge Recommendations Recommend post-acute placement for additional speech therapy services prior to discharge home

## 2021-07-12 NOTE — PROGRESS NOTES
Critical Care Progress Note    Date of admission  7/7/2021    Chief Complaint  75 y.o. male w/ ESRD, CHF, DM, who presented 7/7/2021 with shortness of breath and underwent a large volume thoracentesis and became hypotensive early this morning associated with some hallucinations.  He was given 500 mL crystalloid bolus and due to increasing rales and O2 requirement no further fluid was given.    Hospital Course  7/9 admitted to ICU  7/10 NE @ 5 for MAP > 65  7/12 off NE overnight 7/11 but back on this am.     Interval Problem Update  Reviewed last 24 hour events:  Remains critically ill  Weaning down NE gtt and was off this am. But back on NE Gtt at 2mcg/min  Pt coughs when taking oral intake. Had FEES today and pt fails.   Pt requests to discuss about goal of care. Palliative care was consulted.   SBP in 80-110s  HR in 80-90s  Afebrile Tm 36.5C  WBC 6.3, plt 245K  Procal 0.86  Vanc + Zosyn. MRSA nasal screen is negative, will discontinue vancomcyin.   Hgb 8.1  Mobilized to chair yesterday  On midodrine 15 TID  MRI hand is pending.   Had bladder scan with 1L urine, pt only able to urinate 450cc.   Will place palacios for urinary retention      Review of Systems  Review of Systems   Constitutional: Negative for fever and malaise/fatigue.   Respiratory: Negative for cough and shortness of breath.    Cardiovascular: Negative for chest pain, orthopnea and leg swelling.   Gastrointestinal: Negative for abdominal pain, diarrhea, nausea and vomiting.   Musculoskeletal: Negative for back pain, falls and joint pain.        Tender in left hand with movement, mainly left 2nd, 3rd and 4th.    Skin: Negative for rash.   Neurological: Negative for weakness and headaches.   All other systems reviewed and are negative.       Vital Signs for last 24 hours   Temp:  [36.2 °C (97.1 °F)-36.5 °C (97.7 °F)] 36.5 °C (97.7 °F)  Pulse:  [] 93  Resp:  [10-30] 17  BP: ()/(23-95) 84/45  SpO2:  [89 %-100 %] 95 %    Hemodynamic parameters  for last 24 hours       Respiratory Information for the last 24 hours       Physical Exam   Physical Exam  Vitals and nursing note reviewed.   Constitutional:       Comments: Comfortable  Appears depressed     HENT:      Head: Normocephalic.      Mouth/Throat:      Mouth: Mucous membranes are moist.   Cardiovascular:      Rate and Rhythm: Normal rate. Rhythm irregular.      Pulses: Normal pulses.      Heart sounds: No murmur heard.   No gallop.    Pulmonary:      Effort: Pulmonary effort is normal. No respiratory distress.      Breath sounds: No wheezing or rales.   Abdominal:      General: Abdomen is flat. There is no distension.      Palpations: Abdomen is soft.      Tenderness: There is no abdominal tenderness. There is no guarding.   Musculoskeletal:      Comments: Right BKA stump is clean  Left BKA stump with clean and dry surgical incision. Dried blood. Minimal erythema but no drainage. Not appear infected. Sutures were removed.    Skin:     General: Skin is warm.      Capillary Refill: Capillary refill takes less than 2 seconds.   Neurological:      General: No focal deficit present.      Mental Status: He is alert and oriented to person, place, and time.      Motor: No weakness.         Medications  Current Facility-Administered Medications   Medication Dose Route Frequency Provider Last Rate Last Admin   • alteplase (CATHFLO) syringe *Central Line Only* 2 mg  2 mg Intracatheter Once Jose Cheung D.O.       • alteplase (CATHFLO) syringe *Central Line Only* 2 mg  2 mg Intracatheter Once Jose Cheung D.O.       • traMADol (ULTRAM) 50 MG tablet 50 mg  50 mg Oral BID PRN LOVE CadeO.       • acetaminophen (TYLENOL) tablet 500 mg  500 mg Oral Q6HRS PRN ADE Cade.OTrey   500 mg at 07/11/21 1931   • [START ON 7/13/2021] epoetin (Retacrit) injection (Dialysis Use Only) 6,000 Units  6,000 Units Subcutaneous TUE+THU+SAT Cindi Stein D.O.       • midodrine (PROAMATINE) tablet 15 mg  15 mg Oral TID WITH MEALS  Waylon Smith M.D.   15 mg at 07/12/21 0735   • heparin injection 5,000 Units  5,000 Units Subcutaneous Q8HRS Waylon Smith M.D.   5,000 Units at 07/12/21 0536   • norepinephrine (Levophed) 8 mg in 250 mL NS infusion (premix)  0-30 mcg/min Intravenous Continuous Jose Cheung D.O.   Stopped at 07/12/21 0205   • MD Alert...Vancomycin per Pharmacy   Other PHARMACY TO DOSE Waylon Smith M.D.       • piperacillin-tazobactam (ZOSYN) 4.5 g in  mL IVPB  4.5 g Intravenous Q12HRS Waylon Smith M.D. 25 mL/hr at 07/12/21 0536 4.5 g at 07/12/21 0536   • heparin injection 2,000 Units  2,000 Units Intravenous DIALYSIS PRN Kulwant Hodges M.D.   2,000 Units at 07/10/21 0946   • heparin injection 3,700 Units  3,700 Units Intracatheter DIALYSIS PRN Kulwant Hodges M.D.   3,700 Units at 07/10/21 1157   • gabapentin (NEURONTIN) capsule 300 mg  300 mg Oral TID James Mott D.O.   300 mg at 07/12/21 0537   • atorvastatin (LIPITOR) tablet 40 mg  40 mg Oral QHS Fortino Tomlinson M.D.   40 mg at 07/11/21 2056   • calcitRIOL (ROCALTROL) capsule 0.25 mcg  0.25 mcg Oral DAILY Fortino Tomlinson M.D.   0.25 mcg at 07/12/21 0538   • clopidogrel (PLAVIX) tablet 75 mg  75 mg Oral DAILY Fortino Tomlinson M.D.   75 mg at 07/12/21 0537   • insulin glargine (Semglee) injection  10 Units Subcutaneous QHS Fortino Tomlinson M.D.   10 Units at 07/11/21 2056   • methimazole (TAPAZOLE) tablet 5 mg  5 mg Oral BID Fortino Tomlinson M.D.   5 mg at 07/12/21 0545   • omeprazole (PRILOSEC) capsule 20 mg  20 mg Oral BID Fortino Tomlinson M.D.   20 mg at 07/12/21 0537   • sevelamer carbonate (RENVELA) tablet 1,600 mg  1,600 mg Oral TID WITH MEALS Fortino Tomlinson M.D.   1,600 mg at 07/12/21 0735   • tamsulosin (FLOMAX) capsule 0.8 mg  0.8 mg Oral QHS Fortino Tomlinson M.D.   0.8 mg at 07/11/21 2056   • traZODone (DESYREL) tablet 50 mg  50 mg Oral QHS Fortino Tomlinson M.D.   50 mg at 07/11/21 2056   • senna-docusate (PERICOLACE or SENOKOT S) 8.6-50 MG per tablet 2 tablet  2 tablet Oral BID  Fortino Tomlinson M.D.   2 tablet at 07/12/21 0537    And   • polyethylene glycol/lytes (MIRALAX) PACKET 1 Packet  1 Packet Oral QDAY PRN Fortino Tomlinson M.D.        And   • bisacodyl (DULCOLAX) suppository 10 mg  10 mg Rectal QDAY PRN Fortino Tomlinson M.D.       • guaiFENesin dextromethorphan (ROBITUSSIN DM) 100-10 MG/5ML syrup 10 mL  10 mL Oral Q6HRS PRN Fortino Tomlinson M.D.       • ondansetron (ZOFRAN) syringe/vial injection 4 mg  4 mg Intravenous Q4HRS PRN Fortino Tomlinson M.D.       • ondansetron (ZOFRAN ODT) dispertab 4 mg  4 mg Oral Q4HRS PRN Fortino Tomlinson M.D.       • insulin regular (HumuLIN R,NovoLIN R) injection  1-6 Units Subcutaneous 4X/DAY ACHCHARLY Tomlinson M.D.   1 Units at 07/11/21 2057    And   • glucose 4 g chewable tablet 16 g  16 g Oral Q15 MIN PRN Fortino Tomlinson M.D.   8 g at 07/12/21 0652    And   • dextrose 50% (D50W) injection 50 mL  50 mL Intravenous Q15 MIN PRN Fortino Tomlinson M.D.           Fluids    Intake/Output Summary (Last 24 hours) at 7/12/2021 0743  Last data filed at 7/12/2021 0600  Gross per 24 hour   Intake 1474.61 ml   Output 0 ml   Net 1474.61 ml       Laboratory          Recent Labs     07/10/21  1700 07/11/21  0507 07/12/21  0440   SODIUM 136 135 133*   POTASSIUM 4.1 4.6 4.8   CHLORIDE 99 98 98   CO2 25 27 28   BUN 16 21 32*   CREATININE 3.24* 3.97* 5.05*   MAGNESIUM 1.8 1.9 2.1   PHOSPHORUS  --   --  3.9   CALCIUM 8.4* 8.7 9.0     Recent Labs     07/10/21  1700 07/11/21  0507 07/12/21  0440   GLUCOSE 165* 92 93     Recent Labs     07/10/21  1700 07/11/21  0507 07/12/21  0440   WBC 7.5 7.3 6.3     Recent Labs     07/10/21  1700 07/11/21  0507 07/12/21  0440   RBC 2.74* 2.67* 2.59*   HEMOGLOBIN 8.8* 8.3* 8.1*   HEMATOCRIT 28.0* 27.6* 26.4*   PLATELETCT 311 321 245       Imaging  X-Ray:  I have personally reviewed the images and compared with prior images.    Assessment/Plan  * Septic shock (HCC)  Assessment & Plan  This is Septic shock Present on admission  SIRS criteria identified on my evaluation include:  Tachycardia, with heart rate greater than 90 BPM and Tachypnea, with respirations greater than 20 per minute  Source is unknown   Presentation includes: hypotension after modified sepsis bolus  Despite appropriate fluid resuscitation with crystalloid given per sepsis guidelines, the patient remains hypotensive with systolic blood pressure less than 90 or MAP less than 65  Hemodynamic support with additional fluids and IV vasopressors as needed to maintain a SBP of 90 or MAP of 65  IV antibiotics as appropriate for source of sepsis  Reassessment: I have reassessed the patient's hemodynamic status     Potential sources: L hand dry gangrene/abscess. Doubt left BKA as this doesn't appear infected. Line infection is a possibility   MRSA screen is negative, discontinue vancomycin.   Continue piptazo   Pt has hx of ESBL Klebsiella in foot in the past. I to unasyn, S to piptazo.   NE titrated to MAP > 65  Follow cultures  Xray of left hand w/o evidence of OM. CRP is high, procal is high.   MRI hand is pending.     Acute on chronic respiratory failure (HCC)- (present on admission)  Assessment & Plan  Acute respiratory insufficiency - resolved.   Titrate supplemental O2 to maintain O2 saturation > 92%  Aggressive pulmonary hygiene  Encourage incentive spirometry    Pleural effusion- (present on admission)  Assessment & Plan  Cultures NGTD    Ischemia of finger- (present on admission)  Assessment & Plan  Dry gangrene of L hand  Warm to touch, erythematous  Empiric antibiotics  Wound care    Goals of care, counseling/discussion  Assessment & Plan  7/10 Dr. Gonda had extensive discussion with the patient regarding his condition.  We also called his wife, [place], on speaker phone.  He ultimately made the decision to have a short course of vasopressor administration to see if his BP improved.  He affirms his desire to be DNAR/DNI.    7/12 had long discussion with patient regarding goal of care. Patient will benefit from palliative  care consult. Order was placed.     Paroxysmal atrial fibrillation (HCC)- (present on admission)  Assessment & Plan  Rate controlled  Optimize electrolytes  Home NOAC    End stage renal disease on dialysis (HCC)- (present on admission)  Assessment & Plan  Continue iHD per schedule. Tuesday, Thursday and Saturday.   Monitor electrolytes and replete as needed    Type 2 diabetes mellitus with kidney complication, with long-term current use of insulin (HCC)- (present on admission)  Assessment & Plan  Goal glucose 140-180  SSI + LA    CAD (coronary artery disease)- (present on admission)  Assessment & Plan  ASA + Statin        VTE:  Heparin  Ulcer: Not Indicated  Lines: Central Line  Ongoing indication addressed    I have performed a physical exam and reviewed and updated ROS and Plan today (7/12/2021). In review of yesterday's note (7/11/2021), there are no changes except as documented above.     Discussed patient condition and risk of morbidity and/or mortality with Hospitalist, RN, RT, Charge nurse / hot rounds and Patient  The patient remains critically ill.  Critical care time = 50 minutes in directly providing and coordinating critical care and extensive data review.  No time overlap and excludes procedures.

## 2021-07-12 NOTE — CARE PLAN
Problem: Skin Integrity  Goal: Skin integrity is maintained or improved  Outcome: Progressing     Problem: Pain - Standard  Goal: Alleviation of pain or a reduction in pain to the patient’s comfort goal  Outcome: Progressing     Problem: Knowledge Deficit - Standard  Goal: Patient and family/care givers will demonstrate understanding of plan of care, disease process/condition, diagnostic tests and medications  Outcome: Progressing     Problem: Fall Risk  Goal: Patient will remain free from falls  Outcome: Progressing   The patient is being weaned from levophed, more lethargic this afternoon but oriented x3. Stitches from left lower extremity removed by APRN this afternoon, do not think its the source of infection.    Shift Goals  Clinical Goals: wean vasopressors, reorient  Patient Goals: pain control, wean O2  Family Goals: chase    Progress made toward(s) clinical / shift goals:  yes/yes    Patient is not progressing towards the following goals:

## 2021-07-12 NOTE — PROGRESS NOTES
Patient refusing palacios catheter, educated on risks of infection, patient verbalized understanding of risk or retained urine

## 2021-07-12 NOTE — WOUND TEAM
[facility]  Inpatient Services  Initial Wound and Skin Care Evaluation    Admission Date: 2021     Last order of IP CONSULT TO WOUND CARE was found on 7/10/2021 from Hospital Encounter on 2021     HPI, PMH, SH: Reviewed    Past Surgical History:   Procedure Laterality Date   • AV FISTULA REVISION Left 2021    Procedure: UPPER EXTREMITY, VENOGRAM;  Surgeon: John Nguyen M.D.;  Location: [facility];  Service: Vascular   • VEIN LIGATION Left 2021    Procedure: LIGATION, VEIN;  Surgeon: John Nguyen M.D.;  Location: [facility];  Service: Vascular   • CATH PLACEMENT Left 2021    Procedure: INSERTION, CATHETER -  PERMA CATH;  Surgeon: John Nguyen M.D.;  Location: [facility];  Service: Vascular   • KNEE AMPUTATION BELOW Left 2021    Procedure: AMPUTATION, BELOW KNEE.;  Surgeon: Jarett Márquez M.D.;  Location: [facility];  Service: Orthopedics   • KNEE AMPUTATION BELOW Right 2020    Procedure: AMPUTATION, BELOW KNEE ...;  Surgeon: Leobardo Lynn M.D.;  Location: [facility];  Service: Orthopedics   • TOE AMPUTATION Right 2020    Procedure: AMPUTATION, TOE GREAT;  Surgeon: Leobardo Lynn M.D.;  Location: [facility];  Service: Orthopedics   • TOE AMPUTATION Left 2020    Procedure: AMPUTATION, TOE GREAT;  Surgeon: Leobardo Lynn M.D.;  Location: [facility];  Service: Orthopedics   • TENOTOMY Left 2020    Procedure: FLEXOR TENOTOMIES, TWO-FIVE TOES;  Surgeon: Leobardo Lynn M.D.;  Location: [facility];  Service: Orthopedics   • APPENDECTOMY     • OTHER CARDIAC SURGERY      stents x1   • OTHER ORTHOPEDIC SURGERY      knee surgery     Social History     Tobacco Use   • Smoking status: Former Smoker     Packs/day: 1.00     Years: 55.00     Pack years: 55.00     Types: Cigarettes     Quit date: 2014     Years since quittin.4   • Smokeless  tobacco: Never Used   Substance Use Topics   • Alcohol use: Not Currently     Chief Complaint   Patient presents with   • Shortness of Breath   • Cough     Diagnosis: Pleural effusion [J90]    Unit where seen by Wound Team: T609/00     WOUND CONSULT/FOLLOW UP RELATED TO:  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     WOUND HISTORY:  Patient has PVD and poor circulation to fingers.  Left hand eschar developed over a period of time, eschar formed over.  Eschar is stable, but the deepa-skin is started to mottle, patient is on 2 pressors in the ICU.  Left BKA incision site has eschar developed intact and stable.      WOUND ASSESSMENT/LDA  Wound 05/20/21 Soft Tissue Necrosis Foot;Toe, 2nd;Toe, 3rd (Active)   Number of days: 52       Wound 05/28/21 Incision Leg Left adaptic, 4x4s, webril, ace (Active)   Wound Image     07/11/21 1800   Site Assessment Eschar 07/11/21 1800   Periwound Assessment Clean;Dry;Intact 07/11/21 1800   Margins Defined edges 07/11/21 1800   Closure Open to air 07/11/21 1800   Drainage Amount None 07/11/21 1800   Drainage Description EMELIA 07/11/21 0800   Wound Cleansing Not Applicable 07/11/21 1800   Periwound Protectant Not Applicable 07/11/21 1800   Dressing Cleansing/Solutions Not Applicable 07/11/21 1800   Dressing Options Open to Air 07/11/21 1800   Dressing Status Open to Air 07/11/21 1800   Number of days: 44       Wound 06/19/21 Finger, 2nd;Finger, 3rd;Finger, 4th Left (Active)   Wound Image    07/11/21 1800   Site Assessment Eschar 07/11/21 1800   Periwound Assessment Dark edges;Black;Cool;Purple 07/11/21 1800   Margins Defined edges 07/11/21 1800   Closure Open to air 07/11/21 1800   Drainage Amount None 07/11/21 1800   Drainage Description EMELIA 07/11/21 0800   Treatments Cleansed 07/09/21 2000   Wound Cleansing Approved Wound Cleanser 07/10/21 1200   Dressing Cleansing/Solutions 3% Betadine 07/11/21 1800   Dressing Options Open to Air 07/11/21 1800   Dressing Changed Observed 07/11/21 1800      Dressing Status Open to Air 07/11/21 1800   Dressing Change/Treatment Frequency Daily, and As Needed 07/11/21 1800   NEXT Dressing Change/Treatment Date 07/12/21 07/11/21 1800   NEXT Weekly Photo (Inpatient Only) 07/14/21 07/11/21 1800   Shape irregular 07/11/21 1800   Wound Odor None 07/11/21 1800   Pulses Radial;Thready 07/11/21 1800   Exposed Structures EMELIA;None 07/11/21 1800   WOUND NURSE ONLY - Time Spent with Patient (mins) 45 07/11/21 1800   Number of days: 22        Vascular:    PATSY:   No results found.    Lab Values:    Lab Results   Component Value Date/Time    WBC 7.3 07/11/2021 05:07 AM    RBC 2.67 (L) 07/11/2021 05:07 AM    HEMOGLOBIN 8.3 (L) 07/11/2021 05:07 AM    HEMATOCRIT 27.6 (L) 07/11/2021 05:07 AM    CREACTPROT 11.20 (H) 07/11/2021 12:08 PM    SEDRATEWES >120 (H) 11/13/2020 10:28 PM    HBA1C 5.7 (H) 05/11/2021 06:30 AM        Culture Results show:  No results found for this or any previous visit (from the past 720 hour(s)).    Pain Level/Medicated:  0/10     INTERVENTIONS BY WOUND TEAM:  Chart and images reviewed. Discussed with bedside RN. All areas of concern (based on picture review, LDA review and discussion with bedside RN) have been thoroughly assessed. Documentation of areas based on significant findings. This RN in to assess patient. Performed standard wound care which includes appropriate positioning, dressing removal and non-selective debridement. Pictures and measurements obtained weekly if/when required.  Primary Dressing: 3% betadine to the eschar   Secondary (Outer) Dressing: ALHAJI    Interdisciplinary consultation: Patient, Bedside RN (Mirlande)    EVALUATION / RATIONALE FOR TREATMENT:  Most Recent Date:  07/11/2021: PVD to the extremities with the addition of pressors has caused microvascular disease to progress.  Eschar has formed to the left hand previously and is worsening due to the pressors being used.  MRI has been ordered, possible osteo.  Continue with betadine to keep  eschar stable. LPS has seen patient's left BKA and removed sutures and left ALHAJI.      Goals: Steady decrease in wound area and depth weekly.    WOUND TEAM PLAN OF CARE ([X] for frequency of wound follow up,):   Nursing to follow orders written for wound care. Contact wound team if area fails to progress, deteriorates or with any questions/concerns  Dressing changes by wound team:                   Follow up 3 times weekly:                NPWT change 3 times weekly:     Follow up 1-2 times weekly:      Follow up Bi-Monthly:                   Follow up as needed:   x  Other (explain):     NURSING PLAN OF CARE ORDERS (X):  Dressing changes: See Dressing Care orders: x  Skin care: See Skin Care orders:   RN Prevention Protocol:   Rectal tube care: See Rectal Tube Care orders:   Other orders:    RSKIN:   CURRENTLY IN PLACE (X), APPLIED THIS VISIT (A), ORDERED (O):   Q shift Kwame:  X  Q shift pressure point assessments:  X    Surface/Positioning   Pressure redistribution mattress            Low Airloss      ICU GELA    Bariatric foam      Bariatric GELA     Waffle cushion        Waffle Overlay          Reposition q 2 hours    x  TAPs Turning system     Z Shad Pillow     Offloading/Redistribution NA  Sacral Mepilex (Silicone dressing)     Heel Mepilex (Silicone dressing)         Heel float boots (Prevalon boot)             Float Heels off Bed with Pillows           Respiratory NA  Silicone O2 tubing         Gray Foam Ear protectors     Cannula fixation Device (Tender )          High flow offloading Clip    Elastic head band offloading device      Anchorfast                                                         Trach with Optifoam split foam             Containment/Moisture Prevention NA    Rectal tube or BMS    Purwick/Condom Cath        Tomas Catheter    Barrier wipes           Barrier paste       Antifungal tx      Interdry        Mobilization NA      Up to chair        Ambulate      PT/OT      Nutrition NA       Dietician        Diabetes Education      PO     TF     TPN     NPO   # days     Other        Anticipated discharge plans:   LTACH:     x   SNF/Rehab:   x               Home Health Care:           Outpatient Wound Center:            Self/Family Care:        Other:

## 2021-07-12 NOTE — PROGRESS NOTES
"Patient has failed his swallow study, he is upset and angry that he cannot eat the food he wants. He understands that if he does, it risks aspiration pna. He clearly stated that if he \"cannot eat the food I want then what is the point?\" Dr. Cheung was notified by , is bedside and spoke with patient. Palliative will be consulted and stop by to discuss plan of care with the patient.   Updated PCP Dr. Ac via voalte  "

## 2021-07-12 NOTE — CARE PLAN
Problem: Pain - Standard  Goal: Alleviation of pain or a reduction in pain to the patient’s comfort goal  Outcome: Progressing  Note: Pt is able to verbalize pain on a scale of 1-10 and is able to verbalize comfort goal. Pain management plan followed and pt educated on nonpharmacologic and pharmacological comfort measures.        Problem: Knowledge Deficit - Standard  Goal: Patient and family/care givers will demonstrate understanding of plan of care, disease process/condition, diagnostic tests and medications  Outcome: Progressing  Note: Pt updated on POC, tests, and medications. Pt verbalizes understanding and has no further questions at this time. Pt educated on calling for any more questions.      The patient is Watcher - Medium risk of patient condition declining or worsening    Shift Goals  Clinical Goals: wean vasopressors, reorient as needed  Patient Goals: pain control, wean O2

## 2021-07-12 NOTE — PROGRESS NOTES
Pt's blood sugar is 49  Pt A&Ox4, able to follow commands and swallow safely  Pt given 2 glucose tablets and two orange juices  Daytime RN will follow up with blood sugar recheck

## 2021-07-13 NOTE — PROGRESS NOTES
"[facility] -  PROGRESS NOTE               Author: Mario Hunter M.D. Date & Time: 7/13/2021  8:21 AM     HPI:  74 y/o man with ESRD HD T,TH,SAt presented with SOB. Pt has reccurrent pleural effusion. Pt /sp thoracentisis in the past.  Pt with recent ligation of left arm AVF  Pt makes a small amount of urine    DAILY NEPHROLOGY SUMMARY:  7/9: HD yesterday 1 liter UF, but transferred to ICU for low BP. He is on pressors now  7/10: Pt sitting up in bed, comfortable, L IJ CVC placed yesterday, on 15 mcg levo gtt, completed iHD today, resp status stable  7/11: remains on pressors, tolerated HD, no new c/o   7/12:  Remains on pressors, long discussion regarding GOC, overall malaise   7/13: No acute events, resting comfortably, dialysis planned ~11am    REVIEW OF SYSTEMS:    10 point ROS performed, negative other than stated above    PMH/PSH/SH/FH: Reviewed and unchanged since admission note  CURRENT MEDICATIONS: Reviewed from admission to present day    VS:  /39   Pulse 98   Temp 36.1 °C (97 °F) (Temporal)   Resp 15   Ht 1.676 m (5' 6\")   Wt 74.5 kg (164 lb 3.9 oz)   SpO2 99%   BMI 25.78 kg/m²   Physical Exam  Vitals and nursing note reviewed.   Constitutional:       General: He is not in acute distress.  HENT:      Head: Normocephalic and atraumatic.   Eyes:      General:         Right eye: No discharge.         Left eye: No discharge.      Extraocular Movements: Extraocular movements intact.      Conjunctiva/sclera: Conjunctivae normal.   Cardiovascular:      Rate and Rhythm: Normal rate and regular rhythm.      Heart sounds: Normal heart sounds.   Pulmonary:      Effort: Pulmonary effort is normal.      Breath sounds: No rales.      Comments: Diminished t/o   Abdominal:      General: Abdomen is flat. There is no distension.      Palpations: Abdomen is soft.      Tenderness: There is no abdominal tenderness.   Musculoskeletal:      Cervical back: Normal range of motion and neck " supple.      Right lower leg: No edema.      Left lower leg: No edema.      Comments: B/l BKA   Skin:     General: Skin is warm and dry.      Comments: Necrotic L fingers/dry gangrene   Neurological:      General: No focal deficit present.      Mental Status: He is alert and oriented to person, place, and time.   Psychiatric:         Mood and Affect: Mood normal.         Behavior: Behavior normal.         Thought Content: Thought content normal.         Judgment: Judgment normal.         Fluids:  In: 1238 [P.O.:1238]  Out: 925     LABS:  Recent Labs     07/11/21  0507 07/12/21  0440 07/13/21  0530   SODIUM 135 133* 133*   POTASSIUM 4.6 4.8 5.1   CHLORIDE 98 98 97   CO2 27 28 26   GLUCOSE 92 93 70   BUN 21 32* 40*   CREATININE 3.97* 5.05* 5.62*   CALCIUM 8.7 9.0 9.0     Available labs, imaging and clinical documentation reviewed.     IMPRESSION:  # ESRD, dependent on HD qTTS    # Hypotension, acute on chronic  - Currently requiring vasopressor therapy  - On 15 mg midodrine TID    # Anemia of CKD, below target   - ARMANDO with HD, goal Hgb 10-11    # PVD, severe  - On Plavix     # Acute on chronic respiratory failure, improved   - s/p R thora 7/8 with 2 L removed  - On supp O2 via NC    # HFrEF  - EF 30%     # CKD-MBD  - phos and Ca wnl  - On calcitriol and sevelamer     # Hyperthyroidism, on methimazole    # HLD, on statin therapy    # BPH, on tamsulosin    Plan:  -HD today, Continue iHD qTTS/PRN  -ongoing GOC discussions, would  Continue to involve palliative   -Maintain MAP >65 mmHg  -Max dose gabapentin in ESRD is 300 mg/day  -Dose all meds for eGFR <15   -Avoid nephrotoxins/NSAIDs  -Renal/low Na/fluid restricted diet  -Continue calcitriol, and sevelamer with meals  -ARMANDO with HD, goal Hgb 10-11, dose increased  -Transfuse PRN Hgb <7   -Daily labs  -Strict I/O    Pt is at high risk for further morbidity/mortality with poor overall prognosis     Thank you

## 2021-07-13 NOTE — THERAPY
Pt in dialysis this morning. Will reattempt PT treatment tomorrow as able. Thanks    Jocelin Hughes, PT, DPT Voalte [phone]

## 2021-07-13 NOTE — PROGRESS NOTES
Critical Care Progress Note    Date of admission  7/7/2021    Chief Complaint  75 y.o. male w/ ESRD, CHF, DM, who presented 7/7/2021 with shortness of breath and underwent a large volume thoracentesis and became hypotensive early this morning associated with some hallucinations.  He was given 500 mL crystalloid bolus and due to increasing rales and O2 requirement no further fluid was given.    Hospital Course  7/9 admitted to ICU  7/10 NE @ 5 for MAP > 65  7/12 off NE overnight 7/11 but back on this am.  7/12 off NE  7/12 FEES - failed    Interval Problem Update  Reviewed last 24 hour events:  Remains critically ill  NE gtt was off and on yesterday and appears off overnight and maintaining SBP >100s with MAP >58  Afebrile  On 3L NC, sat >98%  WBC 6.4  Cortisol am level 11.7    Weaning down NE gtt and was off this am. But back on NE Gtt at 2mcg/min  Pt coughs when taking oral intake. Had FEES today and pt fails.   Pt requests to discuss about goal of care. Palliative care was consulted.   SBP in 80-110s  HR in 80-90s  Afebrile Tm 36.5C  WBC 6.3, plt 245K  Procal 0.86  Vanc + Zosyn. MRSA nasal screen is negative, will discontinue vancomcyin.   Hgb 8.1  Mobilized to chair yesterday  On midodrine 15 TID  MRI hand is pending.   Had bladder scan with 1L urine, pt only able to urinate 450cc.   Will place palacios for urinary retention      Review of Systems  Review of Systems   Constitutional: Negative for fever and malaise/fatigue.   Respiratory: Negative for cough and shortness of breath.    Cardiovascular: Negative for chest pain, orthopnea and leg swelling.   Gastrointestinal: Negative for abdominal pain, diarrhea, nausea and vomiting.   Musculoskeletal: Negative for back pain, falls and joint pain.        Tender in left hand with movement, mainly left 2nd, 3rd and 4th.    Skin: Negative for rash.   Neurological: Negative for weakness and headaches.   All other systems reviewed and are negative.       Vital Signs for last  24 hours   Temp:  [36.1 °C (97 °F)-36.6 °C (97.9 °F)] 36.1 °C (97 °F)  Pulse:  [77-98] 98  Resp:  [4-31] 15  BP: ()/(24-51) 104/39  SpO2:  [78 %-100 %] 99 %    Hemodynamic parameters for last 24 hours       Respiratory Information for the last 24 hours       Physical Exam   Physical Exam  Vitals and nursing note reviewed.   Constitutional:       Comments: Comfortable  Appears depressed     HENT:      Head: Normocephalic.      Mouth/Throat:      Mouth: Mucous membranes are moist.   Cardiovascular:      Rate and Rhythm: Normal rate. Rhythm irregular.      Pulses: Normal pulses.      Heart sounds: No murmur heard.   No gallop.    Pulmonary:      Effort: Pulmonary effort is normal. No respiratory distress.      Breath sounds: No wheezing or rales.   Abdominal:      General: Abdomen is flat. There is no distension.      Palpations: Abdomen is soft.      Tenderness: There is no abdominal tenderness. There is no guarding.   Musculoskeletal:      Comments: Right BKA stump is clean  Left BKA stump with clean and dry surgical incision. Dried blood. Minimal erythema but no drainage. Not appear infected. Sutures were removed.     Left hand with gangrene in 2nd, 3rd, and 4th fingers (DIP) and tip of fingers- mild erythema over MCP/PIP. Not getting worse.   Wrist is ok     Skin:     General: Skin is warm.      Capillary Refill: Capillary refill takes less than 2 seconds.   Neurological:      General: No focal deficit present.      Mental Status: He is alert and oriented to person, place, and time.      Motor: No weakness.   Psychiatric:      Comments: Appears depressed         Medications  Current Facility-Administered Medications   Medication Dose Route Frequency Provider Last Rate Last Admin   • alteplase (CATHFLO) syringe *Central Line Only* 2 mg  2 mg Intracatheter Once oJse Cheung D.O.       • traMADol (ULTRAM) 50 MG tablet 50 mg  50 mg Oral BID PRN LOVE CadeO.   50 mg at 07/12/21 2180   • acetaminophen (TYLENOL)  tablet 500 mg  500 mg Oral Q6HRS PRN Jose Cheung D.O.   500 mg at 07/11/21 1931   • epoetin (Retacrit) injection (Dialysis Use Only) 6,000 Units  6,000 Units Subcutaneous TUE+THU+SAT ADE Cr.BRIAN       • midodrine (PROAMATINE) tablet 15 mg  15 mg Oral TID WITH MEALS Waylon Smith M.D.   15 mg at 07/12/21 1718   • heparin injection 5,000 Units  5,000 Units Subcutaneous Q8HRS Waylon Smith M.D.   5,000 Units at 07/13/21 0535   • norepinephrine (Levophed) 8 mg in 250 mL NS infusion (premix)  0-30 mcg/min Intravenous Continuous Jose Cheung D.O.   Stopped at 07/12/21 1000   • piperacillin-tazobactam (ZOSYN) 4.5 g in  mL IVPB  4.5 g Intravenous Q12HRS Waylon Smith M.D. 25 mL/hr at 07/13/21 0535 4.5 g at 07/13/21 0535   • heparin injection 2,000 Units  2,000 Units Intravenous DIALYSIS PRN Kulwant Hodges M.D.   2,000 Units at 07/10/21 0946   • heparin injection 3,700 Units  3,700 Units Intracatheter DIALYSIS PRN Kulwant Hodges M.D.   3,700 Units at 07/10/21 1157   • gabapentin (NEURONTIN) capsule 300 mg  300 mg Oral TID James Mott D.BRIAN   300 mg at 07/13/21 0535   • atorvastatin (LIPITOR) tablet 40 mg  40 mg Oral QHS Fortino Tomlinson M.D.   40 mg at 07/12/21 2109   • calcitRIOL (ROCALTROL) capsule 0.25 mcg  0.25 mcg Oral DAILY Fortino Tomlinson M.D.   0.25 mcg at 07/13/21 0535   • clopidogrel (PLAVIX) tablet 75 mg  75 mg Oral DAILY Fortino Tomlinson M.D.   75 mg at 07/13/21 0535   • insulin glargine (Semglee) injection  10 Units Subcutaneous QHS Fortino Tomlinson M.D.   10 Units at 07/12/21 2109   • methimazole (TAPAZOLE) tablet 5 mg  5 mg Oral BID Fortino Tomlinson M.D.   5 mg at 07/13/21 0535   • omeprazole (PRILOSEC) capsule 20 mg  20 mg Oral BID Fortino Tomlinson M.D.   20 mg at 07/13/21 0535   • sevelamer carbonate (RENVELA) tablet 1,600 mg  1,600 mg Oral TID WITH MEALS Fortino Tomlinson M.D.   1,600 mg at 07/12/21 1718   • tamsulosin (FLOMAX) capsule 0.8 mg  0.8 mg Oral QHS Fortino Tomlinson M.D.   0.8 mg at 07/12/21 2109    • traZODone (DESYREL) tablet 50 mg  50 mg Oral QHS Fortino Tomlinson M.D.   50 mg at 07/11/21 2056   • senna-docusate (PERICOLACE or SENOKOT S) 8.6-50 MG per tablet 2 tablet  2 tablet Oral BID Fortino Tomlinson M.D.   2 tablet at 07/13/21 0534    And   • polyethylene glycol/lytes (MIRALAX) PACKET 1 Packet  1 Packet Oral QDAY PRN Fortino Tomlinson M.D.        And   • bisacodyl (DULCOLAX) suppository 10 mg  10 mg Rectal QDAY PRN Fortino Tomlinson M.D.       • guaiFENesin dextromethorphan (ROBITUSSIN DM) 100-10 MG/5ML syrup 10 mL  10 mL Oral Q6HRS PRN Fortino Tomlinson M.D.       • ondansetron (ZOFRAN) syringe/vial injection 4 mg  4 mg Intravenous Q4HRS PRN Fortino Tomlinson M.D.       • ondansetron (ZOFRAN ODT) dispertab 4 mg  4 mg Oral Q4HRS PRN Fortino Tomlinson M.D.       • insulin regular (HumuLIN R,NovoLIN R) injection  1-6 Units Subcutaneous 4X/DAY OLINDA Tomlinson M.D.   1 Units at 07/12/21 2108    And   • glucose 4 g chewable tablet 16 g  16 g Oral Q15 MIN PRN Fortino Tomlinson M.D.   16 g at 07/13/21 0604    And   • dextrose 50% (D50W) injection 50 mL  50 mL Intravenous Q15 MIN HEATHER Tomlinson M.D.   50 mL at 07/13/21 0646       Fluids    Intake/Output Summary (Last 24 hours) at 7/13/2021 0703  Last data filed at 7/13/2021 0600  Gross per 24 hour   Intake 1238 ml   Output 925 ml   Net 313 ml       Laboratory          Recent Labs     07/11/21  0507 07/12/21  0440 07/13/21  0530   SODIUM 135 133* 133*   POTASSIUM 4.6 4.8 5.1   CHLORIDE 98 98 97   CO2 27 28 26   BUN 21 32* 40*   CREATININE 3.97* 5.05* 5.62*   MAGNESIUM 1.9 2.1 2.2   PHOSPHORUS  --  3.9  --    CALCIUM 8.7 9.0 9.0     Recent Labs     07/11/21  0507 07/12/21 0440 07/13/21  0530   GLUCOSE 92 93 70     Recent Labs     07/11/21  0507 07/12/21 0440 07/13/21  0530   WBC 7.3 6.3 6.4     Recent Labs     07/11/21 0507 07/12/21 0440 07/13/21  0530   RBC 2.67* 2.59* 2.52*   HEMOGLOBIN 8.3* 8.1* 8.0*   HEMATOCRIT 27.6* 26.4* 26.0*   PLATELETCT 321 245 232       Imaging  X-Ray:  I have personally  reviewed the images and compared with prior images.      Assessment/Plan  * Septic shock (HCC)  Assessment & Plan  This is Septic shock Present on admission  SIRS criteria identified on my evaluation include: Tachycardia, with heart rate greater than 90 BPM and Tachypnea, with respirations greater than 20 per minute  Source is unknown   Presentation includes: hypotension after modified sepsis bolus  Despite appropriate fluid resuscitation with crystalloid given per sepsis guidelines, the patient remains hypotensive with systolic blood pressure less than 90 or MAP less than 65  Hemodynamic support with additional fluids and IV vasopressors as needed to maintain a SBP of 90 or MAP of 65  IV antibiotics as appropriate for source of sepsis  Reassessment: I have reassessed the patient's hemodynamic status     Potential sources: L hand dry gangrene/abscess. Doubt left BKA as this doesn't appear infected. Line infection is a possibility   MRSA screen is negative, discontinue vancomycin.   Continue piptazo   Pt has hx of ESBL Klebsiella in foot in the past. I to unasyn, S to piptazo.   Can target goal BP >90 or MAP >60.   On NE gtt today minimal dose at 2mcg/min  On midodrine  Follow cultures  Xray of left hand w/o evidence of OM. CRP is high, procal is high.   MRI hand is pending.     Acute on chronic respiratory failure (HCC)- (present on admission)  Assessment & Plan  Acute respiratory insufficiency - resolved.   Titrate supplemental O2 to maintain O2 saturation > 92%  Aggressive pulmonary hygiene  Encourage incentive spirometry    Pleural effusion- (present on admission)  Assessment & Plan  Cultures NGTD    Ischemia of finger- (present on admission)  Assessment & Plan  Dry gangrene of L hand  Warm to touch, erythematous  On piptazo  Wound care    Goals of care, counseling/discussion  Assessment & Plan  7/10 Dr. Gonda had extensive discussion with the patient regarding his condition.  We also called his wife, [place], on  speaker phone.  He ultimately made the decision to have a short course of vasopressor administration to see if his BP improved.  He affirms his desire to be DNAR/DNI.    7/12 had long discussion with patient regarding goal of care. Patient will benefit from palliative care consult. Order was placed.     Paroxysmal atrial fibrillation (HCC)- (present on admission)  Assessment & Plan  Rate controlled  Optimize electrolytes  Home NOAC    End stage renal disease on dialysis (HCC)- (present on admission)  Assessment & Plan  Continue iHD per schedule. Tuesday, Thursday and Saturday.   Monitor electrolytes and replete as needed    Type 2 diabetes mellitus with kidney complication, with long-term current use of insulin (HCC)- (present on admission)  Assessment & Plan  Goal glucose 140-180  SSI + LA    CAD (coronary artery disease)- (present on admission)  Assessment & Plan  ASA + Statin        VTE:  Heparin  Ulcer: Not Indicated  Lines: Central Line  Ongoing indication addressed    I have performed a physical exam and reviewed and updated ROS and Plan today (7/13/2021). In review of yesterday's note (7/12/2021), there are no changes except as documented above.     Discussed patient condition and risk of morbidity and/or mortality with Hospitalist, RN, RT, Charge nurse / hot rounds and Patient  The patient remains critically ill.  Critical care time = 35 minutes in directly providing and coordinating critical care and extensive data review.  No time overlap and excludes procedures.   Dressing (No Sutures): dry sterile dressing

## 2021-07-13 NOTE — PROGRESS NOTES
[facility] Progress Note       HD ordered by Dr. Hunter. Treatment started at 1035 and ended at 1335.    Net UF removed: 2000mL.     Pt tolerated treatment well, Levo started during treatment due to hypotension with positive effect. See paper flow sheet for details. CVC patent, locked with Heparin 1,000 units. No s/s of infection present. Dressing in place, CDI. Report given to GORDON Wilson RN.

## 2021-07-13 NOTE — CARE PLAN
Problem: Skin Integrity  Goal: Skin integrity is maintained or improved  Outcome: Progressing     Problem: Pain - Standard  Goal: Alleviation of pain or a reduction in pain to the patient’s comfort goal  Outcome: Progressing     Problem: Knowledge Deficit - Standard  Goal: Patient and family/care givers will demonstrate understanding of plan of care, disease process/condition, diagnostic tests and medications  Outcome: Progressing     Problem: Fall Risk  Goal: Patient will remain free from falls  Outcome: Progressing   The patient is hemodynamically stable, more alert today. Unfortunately did not pass his swallow eval, Liquifed diet, thickened. Patient is upset but more open to sticking to this new diet as long as there is hope to strengthen his swallowing muscles. Patient sat in chair x2 today, tolerated well. Family bedside and updated.    Shift Goals  Clinical Goals: stable hemodynamics  Patient Goals: pain control  Family Goals: chase    Progress made toward(s) clinical / shift goals:  yes/yes    Patient is not progressing towards the following goals:

## 2021-07-14 NOTE — THERAPY
Physical Therapy   Daily Treatment     Patient Name: Luis Sepulveda  Age:  75 y.o., Sex:  male  Medical Record #: [id number]  Today's Date: 7/14/2021     Precautions: Fall Risk, Swallow Precautions ( See Comments)    Assessment    Pt seen for PT treatment session. Pt motivated but delayed with responses and required extra time to initiate mobility. Demonstrated fair balance sitting EOB to prep R residual limb to don prosthetic. Pt required extra time to identify which stump sock he would need and appeared to be forgetful of task at times. PT session was limited as RN reported transport coming to take pt to MRI. PT will continue to follow.    Plan    Continue current treatment plan.    DC Equipment Recommendations: Unable to determine at this time  Discharge Recommendations: Recommend post-acute placement for additional physical therapy services prior to discharge home         07/14/21 1515   Precautions   Precautions Fall Risk;Swallow Precautions ( See Comments)   Comments B BKA, gangrene L fingers   Vitals   O2 Delivery Device Silicone Nasal Cannula   Pain 0 - 10 Group   Therapist Pain Assessment Nurse Notified  (L hand pain, not rated)   Cognition    Cognition / Consciousness WDL   Level of Consciousness Alert   Comments motivated to work with therapy   Balance   Sitting Balance (Static) Fair +   Sitting Balance (Dynamic) Fair   Weight Shift Sitting Fair   Skilled Intervention Verbal Cuing   Gait Analysis   Gait Level Of Assist Unable to Participate   Comments unable to trial as MRI coming for pt   Bed Mobility    Supine to Sit Minimal Assist   Sit to Supine Supervised   Scooting Minimal Assist   Rolling Supervised   Skilled Intervention Verbal Cuing;Compensatory Strategies   Comments with use of bed rail   Functional Mobility   Sit to Stand Unable to Participate   Short Term Goals    Short Term Goal # 1 Pt will perform bed mobility with supv within 6 visits   Goal Outcome # 1 Progressing as expected   Short  Term Goal # 2 Pt will transfer bed<->W/C with supv within 6 visits in order to return home   Goal Outcome # 2 Goal not met   Short Term Goal # 3 Pt will transfer sit to stand with min A with R prosthesis and FWW within 6 visits in order to promote return home   Goal Outcome # 3 Goal not met   Anticipated Discharge Equipment and Recommendations   DC Equipment Recommendations Unable to determine at this time   Discharge Recommendations Recommend post-acute placement for additional physical therapy services prior to discharge home

## 2021-07-14 NOTE — THERAPY
"Occupational Therapy  Daily Treatment     Patient Name: Luis Sepulveda  Age:  75 y.o., Sex:  male  Medical Record #: [id number]  Today's Date: 7/13/2021       Precautions: Fall Risk, Swallow Precautions ( See Comments)  Comments: B BKA    Assessment    Pt seen for OT tx with emphasis on L hand function. Pt received supine in bed, c/o feeling fatigued and with visible tremors all limbs. Mobilized to EOB with mod A then able to maintain sitting balance without assist. Pt is L-hand dominant. L hand is limited by gangrene to digits 2-4 negatively impacting grasp and functional use. Trained pt on modified pinch grasps to stabilize objects during bimanual grooming task. Trained pt on active and self-ROM to L digits to include: MP flexion to all digits, full flexion to digits 1& 5 and opposition of digits 1 to 5. Educated on option of universal cuff for L hand and will introduce next session. Pt is limited by: generalized weakness, ROM impairments, balance impairment, motor incoordination. Will continue to benefit from acute OT with recommendation for post-acute placement.     Plan    Continue current treatment plan.    DC Equipment Recommendations: Unable to determine at this time  Discharge Recommendations: Recommend post-acute placement for additional occupational therapy services prior to discharge home    Subjective    \"I feel so tired today.\"     Objective       07/13/21 1744   Active ROM Upper Body   Comments L digits 2-4 absent flexion to PIP/DIP, MPs flex to 75 degrees (due to gangrene)   Strength Upper Body   Comments due to ROM deficit due to gangrene    Sitting Upper Body Exercises   Comments Trained pt on AROM to L MPs and A/PROM to L digit 1 & 5 all joints    Fine Motor / Dexterity    Fine Motor / Dexterity Interventions Trained on opposition L thumb to 5th digit for functional pinch as well as scissor pinch between L thumb and 2nd digit    Balance   Sitting Balance (Static) Fair +   Sitting Balance " (Dynamic) Fair   Weight Shift Sitting Fair   Bed Mobility    Supine to Sit Moderate Assist   Scooting Moderate Assist  (seated)   Activities of Daily Living   Grooming Minimal Assist;Seated  (oral care incorporating L hand as stabilizer )   Functional Mobility   Sit to Stand Unable to Participate   Bed, Chair, Wheelchair Transfer Unable to Participate   Mobility Supine > EOB, sitting balance    Short Term Goals   Short Term Goal # 1 Pt will complete ADL transfers with Simone   Goal Outcome # 1 Goal not met   Short Term Goal # 2 Pt will complete LB dressing with supervision   Goal Outcome # 2 Goal not met   Short Term Goal # 3 Pt will complete toileting with supervision   Goal Outcome # 3 Goal not met   Short Term Goal # 4 Pt will self-feed using L hand and AE with supv    Goal Outcome # 4 Goal not met

## 2021-07-14 NOTE — PROGRESS NOTES
"[facility] -  PROGRESS NOTE               Author: Mario Hunter M.D. Date & Time: 7/14/2021  8:19 AM     HPI:  74 y/o man with ESRD HD T,TH,SAt presented with SOB. Pt has reccurrent pleural effusion. Pt /sp thoracentisis in the past.  Pt with recent ligation of left arm AVF  Pt makes a small amount of urine    DAILY NEPHROLOGY SUMMARY:  7/9: HD yesterday 1 liter UF, but transferred to ICU for low BP. He is on pressors now  7/10: Pt sitting up in bed, comfortable, L IJ CVC placed yesterday, on 15 mcg levo gtt, completed iHD today, resp status stable  7/11: remains on pressors, tolerated HD, no new c/o   7/12:  Remains on pressors, long discussion regarding GOC, overall malaise   7/13: No acute events, resting comfortably, dialysis planned ~11am  7/14: No acute events, Norepi weaned off. Ongoing GOC discussions with pt/family, sounds like hoping to optimize comfort going forward      REVIEW OF SYSTEMS:    10 point ROS performed, negative other than stated above    PMH/PSH/SH/FH: Reviewed and unchanged since admission note  CURRENT MEDICATIONS: Reviewed from admission to present day    VS:  BP (!) 96/29   Pulse 92   Temp 36.4 °C (97.5 °F) (Temporal)   Resp 19   Ht 1.676 m (5' 6\")   Wt 73.5 kg (162 lb 0.6 oz)   SpO2 96%   BMI 25.43 kg/m²   Physical Exam  Vitals and nursing note reviewed.   Constitutional:       General: He is not in acute distress.  HENT:      Head: Normocephalic and atraumatic.   Eyes:      General:         Right eye: No discharge.         Left eye: No discharge.      Extraocular Movements: Extraocular movements intact.      Conjunctiva/sclera: Conjunctivae normal.   Cardiovascular:      Rate and Rhythm: Normal rate and regular rhythm.      Heart sounds: Normal heart sounds.   Pulmonary:      Effort: Pulmonary effort is normal.      Breath sounds: No rales.   Abdominal:      General: Abdomen is flat. There is no distension.      Palpations: Abdomen is soft.      " Tenderness: There is no abdominal tenderness.   Musculoskeletal:      Cervical back: Normal range of motion and neck supple.      Right lower leg: No edema.      Left lower leg: No edema.      Comments: B/l BKA   Skin:     General: Skin is warm and dry.      Comments: Necrotic L fingers/dry gangrene   Neurological:      General: No focal deficit present.      Mental Status: He is alert and oriented to person, place, and time.   Psychiatric:         Mood and Affect: Mood normal.         Behavior: Behavior normal.         Thought Content: Thought content normal.         Judgment: Judgment normal.         Fluids:  In: 1810 [P.O.:1310; Dialysis:500]  Out: 2725     LABS:  Recent Labs     07/12/21  0440 07/13/21  0530 07/14/21  0500   SODIUM 133* 133* 134*   POTASSIUM 4.8 5.1 5.0   CHLORIDE 98 97 99   CO2 28 26 27   GLUCOSE 93 70 67   BUN 32* 40* 22   CREATININE 5.05* 5.62* 4.00*   CALCIUM 9.0 9.0 8.6     Available labs, imaging and clinical documentation reviewed.     IMPRESSION:  # ESRD, dependent on HD qTTS    # Hypotension, acute on chronic  - Currently requiring vasopressor therapy  - On 15 mg midodrine TID    # Anemia of CKD, below target   - ARMANDO with HD, goal Hgb 10-11    # PVD, severe  - On Plavix     # Acute on chronic respiratory failure, improved   - s/p R thora 7/8 with 2 L removed  - On supp O2 via NC    # HFrEF  - EF 30%     # CKD-MBD  - phos and Ca wnl  - On calcitriol and sevelamer     # Hyperthyroidism, on methimazole    # HLD, on statin therapy    # BPH, on tamsulosin    Plan:  -No HD today, Continue iHD qTTS/PRN  -ongoing GOC discussions, pt hoping to optimize comfort/time at home. Appreciate palliative assistance   -Dose all meds for ESRD  -Continue calcitriol, and sevelamer with meals  -ARMANDO with HD  -Transfuse PRN Hgb <7   -Strict I/O    Pt is at high risk for further morbidity/mortality with poor overall prognosis     Thank you

## 2021-07-14 NOTE — THERAPY
Missed Therapy     Patient Name: Luis Sepulveda  Age:  75 y.o., Sex:  male  Medical Record #: [id number]  Today's Date: 7/14/2021    Discussed missed therapy with RN       07/14/21 1691   Treatment Variance   Reason For Missed Therapy Medical - Patient  in Procedure   Total Time Spent   Total Time Spent (Mins) 5   Interdisciplinary Plan of Care Collaboration   IDT Collaboration with  Nursing   Collaboration Comments This SLP attempted to see patient for dysphagia tx session. Per RN, patient currently down in MRI. SLP will hold tx and re-attempt tomorrow as able and appropriate. Thank you.

## 2021-07-14 NOTE — PROGRESS NOTES
Hospital Medicine Daily Progress Note    Date of Service  7/15/2021    Chief Complaint  Shortness of breath and cough    Hospital Course  Luis Sepulveda is a 75 y.o. male w/ hx of CAD w/ stent, HFrEF:30%, DM,ESRD on HD, HTN, Afib, PVD w/ bilateral BKA admitted 7/7/2021 with acute respiratory failure despite home 4L oxygen and was found to have a right sided pleural effusion and volume overload.  He had a thoracentesis on 7/8/21 w/ 2050ml of fluids removed. After his thoracentesis he became hypotensive and required pressor support in the ICU.  He was weaned off norepinephrine on 7/13am.    Interval Problem Update  7/15: I met with Dr Hunter and he stated patient's goals of care were to go home and looking at Hospice.  He would continue hemodialysis for a short time.  Patient awake and alert when I evaluated him.  He is having left hand pain.      I have personally seen and examined the patient at bedside. I discussed the plan of care with patient, bedside RN,  and critical care and nephrology.    Consultants/Specialty  critical care and nephrology    Code Status  DNAR/DNI    Disposition  Patient is not medically cleared.   Anticipate discharge to to home with close outpatient follow-up.  I have placed the appropriate orders for post-discharge needs.    Review of Systems  Review of Systems   Constitutional: Positive for malaise/fatigue. Negative for fever.   HENT: Negative for congestion.    Eyes: Negative for blurred vision.   Respiratory: Negative for cough and shortness of breath.    Cardiovascular: Negative for chest pain and palpitations.   Gastrointestinal: Negative for abdominal pain and nausea.   Musculoskeletal: Positive for joint pain.   Neurological: Negative for speech change and headaches.   Psychiatric/Behavioral: The patient is nervous/anxious.         Physical Exam  Temp:  [36 °C (96.8 °F)-36.6 °C (97.9 °F)] 36.1 °C (97 °F)  Pulse:  [] 81  Resp:  [12-20] 15  BP:  ()/(38-76) 125/38  SpO2:  [91 %-100 %] 98 %    Physical Exam  Vitals reviewed.   Constitutional:       Appearance: Normal appearance. He is not diaphoretic.   HENT:      Head: Normocephalic and atraumatic.      Nose: Nose normal.      Mouth/Throat:      Mouth: Mucous membranes are moist.      Pharynx: No oropharyngeal exudate.   Eyes:      General: No scleral icterus.        Right eye: No discharge.         Left eye: No discharge.      Extraocular Movements: Extraocular movements intact.      Conjunctiva/sclera: Conjunctivae normal.   Cardiovascular:      Rate and Rhythm: Normal rate and regular rhythm.      Pulses:           Radial pulses are 2+ on the right side and 2+ on the left side.      Heart sounds: No murmur heard.     Pulmonary:      Effort: Pulmonary effort is normal. No respiratory distress.      Breath sounds: Normal breath sounds. No wheezing or rales.   Abdominal:      General: Bowel sounds are normal. There is no distension.      Palpations: Abdomen is soft.   Musculoskeletal:         General: No swelling or tenderness.      Cervical back: No tenderness. No muscular tenderness.      Comments: Bilateral BKA.   Skin:     Coloration: Skin is not jaundiced or pale.   Neurological:      General: No focal deficit present.      Mental Status: He is alert and oriented to person, place, and time. Mental status is at baseline.      Cranial Nerves: No cranial nerve deficit.   Psychiatric:         Mood and Affect: Mood normal.         Behavior: Behavior normal.         Fluids    Intake/Output Summary (Last 24 hours) at 7/15/2021 1312  Last data filed at 7/15/2021 0845  Gross per 24 hour   Intake --   Output 1825 ml   Net -1825 ml       Laboratory  Recent Labs     07/13/21  0530 07/14/21  0500 07/15/21  0415   WBC 6.4 5.5 5.6   RBC 2.52* 2.42* 2.53*   HEMOGLOBIN 8.0* 7.5* 8.0*   HEMATOCRIT 26.0* 25.1* 26.2*   .2* 103.7* 103.6*   MCH 31.7 31.0 31.6   MCHC 30.8* 29.9* 30.5*   RDW 54.7* 55.9* 54.9*    PLATELETCT 232 227 247   MPV 9.0 9.1 9.0     Recent Labs     07/13/21  0530 07/14/21  0500 07/15/21  0415   SODIUM 133* 134* 135   POTASSIUM 5.1 5.0 6.0*   CHLORIDE 97 99 99   CO2 26 27 26   GLUCOSE 70 67 86   BUN 40* 22 27*   CREATININE 5.62* 4.00* 5.16*   CALCIUM 9.0 8.6 8.9                   Imaging  MR-HAND - W/O LEFT   Final Result      1.  No evidence of osteomyelitis.      2.  Soft tissue loss dorsally and distally involving the second, third and fourth digits.      DX-CHEST-PORTABLE (1 VIEW)   Final Result      1. Improving moderate right pleural effusion.   2. Allowing for differences in technique, the remainder is stable.      DX-HAND 3+ LEFT   Final Result      1.  Multiple sites of osteoarthritis      2.  Small vessel atherosclerotic plaque      3.  No radiographic evidence for osteomyelitis      DX-CHEST-PORTABLE (1 VIEW)   Final Result      1.  Left internal jugular catheter appears appropriately located      2.  Pulmonary edema      3.  Right pleural effusion, probably moderate in size      4.  Bilateral atelectasis      DX-CHEST-PORTABLE (1 VIEW)   Final Result      1.  Interval increased aeration and improvement of RIGHT pleural effusion.   2.  No pneumothorax.         US-THORACENTESIS PUNCTURE RIGHT   Final Result   Addendum 1 of 1   Addendum issued to correct volume of fluid withdrawn.  Actual volume of    pleural fluid withdrawn was 2050 mL.      Final      1. Ultrasound guided right sided diagnostic/therapeutic thoracentesis.      2. 2450 mL of fluid withdrawn.      DX-CHEST-PORTABLE (1 VIEW)   Final Result      1.  Moderate to large right pleural effusion with overlying atelectasis/consolidation.   2.  Interstitial prominence likely represents interstitial edema.   3.  Stable cardiomegaly.           Assessment/Plan  Acute on chronic respiratory failure (HCC)- (present on admission)  Assessment & Plan  Likely sec to R pleural effusion and fluid overload  S/p Thoracentesis w/ 2L  removed  Nephrology consulting for HD  Duoneb, RT protocol  Typically on 4L O2 baseline  Cont monitoring respiratory status    Ischemia of finger- (present on admission)  Assessment & Plan  Dry gangrene/ischemic 2,3,4th fingers  MRI Hand 7/14 reviewed  Wound care.    Acute on chronic systolic congestive heart failure (HCC)- (present on admission)  Assessment & Plan  LVEF 30%  Holding Lisinopril, carvedilol, lasix  2 g salt  Midodrine for hypotension  Adjust meds as needed    Peripheral vascular disease (HCC)- (present on admission)  Assessment & Plan  Hx of PAD with escar noted on L thumb, index and middle finger  7/14 MRI Hand without sign of osteomyelitis    Paroxysmal atrial fibrillation (HCC)- (present on admission)  Assessment & Plan  Rate control and monitor on telemetry  Holding coreg due to hypotension and currently on midodrine    End stage renal disease on dialysis ([facility])- (present on admission)  Assessment & Plan  Nephrology is consulted, plans HD  Monitor labs/vitals.    Type 2 diabetes mellitus with kidney complication, with long-term current use of insulin ([facility])- (present on admission)  Assessment & Plan  On SSI and hypoglycemic protocol  A1c 5.7 2 months ago  Diabetic diet    Anemia  Assessment & Plan  Of chronic kidney disease  EPO per nephrology  Monitor cbc    CAD (coronary artery disease)- (present on admission)  Assessment & Plan  History of  Continue outpatient meds: plavix, statin  Holding lisinopril and coreg due to hypotension    Pleural effusion- (present on admission)  Assessment & Plan  Right side of mod to large pleural effusion  7/7 us thoracentesis with 2L removed    BPH (benign prostatic hyperplasia)  Assessment & Plan  tamsulosin for ongoing active treatment.  Mostly anuric from dialysis    Status post below-knee amputation of both lower extremities (HCC)- (present on admission)  Assessment & Plan  S/p BKA b/l. Wound care to left leg.  Right stump unremarkalble    DNR (do not  resuscitate)- (present on admission)  Assessment & Plan  Per POLST: patient is DNR/DNI    Goals of care, counseling/discussion  Assessment & Plan  I discussed with Dr Hirsch and patient wants to go home with hospice.  Dr Hirsch states they would continue dialysis short term.  I discussed/updated above conversation with case management  Hospice order placed 7/15       VTE prophylaxis: heparin ppx    I have performed a physical exam and reviewed and updated ROS and Plan today (7/15/2021). In review of yesterday's note (7/14/2021), there are no changes except as documented above.

## 2021-07-14 NOTE — TELEPHONE ENCOUNTER
Received Stratification request from [facility]    Procedure: Fistulogram with possible angioplasty    Patient is currently hospitalized, will need follow up after discharge.     Faxed back [phone], transmission complete

## 2021-07-14 NOTE — CARE PLAN
Problem: Care Map:  Day of Discharge Optimal Outcome for the Heart Failure Patient  Goal: Day of Discharge:  Optimal Care of the heart failure patient  Outcome: Progressing     Problem: Skin Integrity  Goal: Skin integrity is maintained or improved  Outcome: Progressing     Problem: Pain - Standard  Goal: Alleviation of pain or a reduction in pain to the patient’s comfort goal  Outcome: Progressing     Problem: Fall Risk  Goal: Patient will remain free from falls  Outcome: Progressing   The patient is still experiencing intermittent hypotension. Levophed needed during HD but turned off soon after.     Shift Goals  Clinical Goals: stable BP  Patient Goals: comfort/rest/adv diet  Family Goals: chase    Progress made toward(s) clinical / shift goals:  yes/yes    Patient is not progressing towards the following goals:

## 2021-07-14 NOTE — CONSULTS
Reason for PC Consult: Advance Care Planning    Consulted by:   Dr. Cheung    Assessment:  General:   75 year old male admitted for pleural effusion on 7/7/21. Pt has a history of COPD, CHF, EF 30%, Diabetes, HD dependent ESRD, 4L Home O2, arrhythmia, arthritis, CAD, HTN, osteoarthritis, peripheral neuropathy, sleep apnea, and bilateral BKAs. Pt presented to [facility] ED with shortness of breath. Upon arrival, pt found to have pleural effusion and volume overload.     Prior PC Consult:   1/08/21, 5/12/21, 5/24/21, 6/25/21    Social:   Pt lives at home with his wife and support from son. Pt has dialysis every tuesday, Thursday and Saturday. Pt is wheelchair bound due to bilateral BKAs.    Dyspnea: No, 98% on 3L NC  Last BM: 07/14/21    Pain: No    Depression: No    Dementia: No       Spiritual:  Is Jainism or spirituality important for coping with this illness? No   Has a  or spiritual provider visit been requested? No    Palliative Performance Scale: 30%    Advance Directive: Advance Directive    DPOA: Yes, [place]; [address] [place]  POLST: Yes     To locate the AD/POLST, please hover the cursor over the patient's code status to find all linked ACP documents.    Code Status: DNR/DNI    Outcome:  PC RN visited wife [place] and son Miguel at bedside. Introduced self and role of PC. Discussed their understanding of clinical picture, they verbalized limited insight and understanding of clinical picture.     Long discussion about quality vs quantity of life, benefits vs burdens of treatment, short term and long term placement, home health vs hospice, and pt's prior expressed wishes with the care team. Discussed pt's swallowing status, safe food options does not prevent further aspiration, discussed pt's hypotension and concern going forward with tolerating dialysis, and explored family's understanding of pt's health status. Family reports that they were not told about pt's health status, he never discussed it  with them nor did they join pt in clinic.     Discussed hospice in detail, philosophy, goals and support provided. Discussed home health in detail and support provided, answered questions. Discussed [facility] to help care for pt at home. Family verbalized not wanting to stop dialysis, they verbalized understanding of pt's wishes and that his quality of life hasn't been what he wanted. They do not, at this point, want to stop dialysis.     Discussed with , they will provide state and local resources to family to research for help in the home.    Discussed with Dr. Hunter, he will call family to discuss clinical picture and answer questions.     Active listening, education, validation, normalization, therapeutic touch, and emotional support provided throughout encounter.    Updated:   Dr. Cheung, Dr. Hunter, bedside RN,  team    Plan:   Continue to discuss GOC as clinical picture unfolds, Nephrology to call family to discuss clinical status.     Recommendations: I do not recommend an ethics or hospice consult at this time because family wishes to continue with current treatment plan.    *Recommendation does not provide clinical appropriateness for hospice nor does it provide that the patient would or would not qualify for hospice services.*     Thank you for allowing Palliative Care to participate in this patient's care. Please feel free to call [phone] with any questions or concerns.

## 2021-07-14 NOTE — PROGRESS NOTES
Critical Care Progress Note    Date of admission  7/7/2021    Chief Complaint  75 y.o. male w/ ESRD, CHF, DM, who presented 7/7/2021 with shortness of breath and underwent a large volume thoracentesis and became hypotensive early this morning associated with some hallucinations.  He was given 500 mL crystalloid bolus and due to increasing rales and O2 requirement no further fluid was given.    Hospital Course  7/9 admitted to ICU  7/10 NE @ 5 for MAP > 65  7/12 off NE overnight 7/11 but back on this am.  7/12 off NE  7/12 FEES - failed    Interval Problem Update  Reviewed last 24 hour events:  Patient is off pressor yesterday am  Alert, oriented.   He   Had dialysis yesterday  Off norepinephrine since last night.   Afebrile  On 3L NC, sat >98%  WBC 6.4  Cortisol am level 11.7  On piptazo  Pending MRI hand  On midodrine 15 TID      Review of Systems  Review of Systems   Constitutional: Negative for fever and malaise/fatigue.   Respiratory: Negative for cough and shortness of breath.    Cardiovascular: Negative for chest pain, orthopnea and leg swelling.   Gastrointestinal: Negative for abdominal pain, diarrhea, nausea and vomiting.   Musculoskeletal: Negative for back pain, falls and joint pain.        Tender in left hand with movement, mainly left 2nd, 3rd and 4th.    Skin: Negative for rash.   Neurological: Negative for weakness and headaches.   All other systems reviewed and are negative.       Vital Signs for last 24 hours   Temp:  [36.2 °C (97.1 °F)-36.4 °C (97.6 °F)] 36.4 °C (97.5 °F)  Pulse:  [] 92  Resp:  [10-39] 19  BP: ()/() 96/29  SpO2:  [75 %-100 %] 96 %    Hemodynamic parameters for last 24 hours       Respiratory Information for the last 24 hours       Physical Exam   Physical Exam  Vitals and nursing note reviewed.   Constitutional:       Comments: Comfortable  Appears depressed     HENT:      Head: Normocephalic.      Mouth/Throat:      Mouth: Mucous membranes are moist.    Cardiovascular:      Rate and Rhythm: Normal rate. Rhythm irregular.      Pulses: Normal pulses.      Heart sounds: No murmur heard.   No gallop.    Pulmonary:      Effort: Pulmonary effort is normal. No respiratory distress.      Breath sounds: No wheezing or rales.   Abdominal:      General: Abdomen is flat. There is no distension.      Palpations: Abdomen is soft.      Tenderness: There is no abdominal tenderness. There is no guarding.   Musculoskeletal:      Comments: Right BKA stump is clean  Left BKA stump with clean and dry surgical incision. Dried blood. Minimal erythema but no drainage. Not appear infected. Sutures were removed.     Left hand with gangrene in 2nd, 3rd, and 4th fingers (DIP) and tip of fingers- mild erythema over MCP/PIP. Not getting worse.   Wrist is ok     Skin:     General: Skin is warm.      Capillary Refill: Capillary refill takes less than 2 seconds.   Neurological:      General: No focal deficit present.      Mental Status: He is alert and oriented to person, place, and time.      Motor: No weakness.   Psychiatric:      Comments: Appears depressed         Medications  Current Facility-Administered Medications   Medication Dose Route Frequency Provider Last Rate Last Admin   • [START ON 7/15/2021] epoetin (Retacrit) injection (Dialysis use only) 10,000 Units  10,000 Units Subcutaneous TUE+THU+SAT Mario Hunter M.D.       • traMADol (ULTRAM) 50 MG tablet 50 mg  50 mg Oral BID PRN Jose Cheung, D.O.   50 mg at 07/13/21 1507   • acetaminophen (TYLENOL) tablet 500 mg  500 mg Oral Q6HRS PRN Jose Cheung D.O.   500 mg at 07/13/21 1735   • midodrine (PROAMATINE) tablet 15 mg  15 mg Oral TID WITH MEALS Waylon Smith M.D.   15 mg at 07/14/21 0634   • heparin injection 5,000 Units  5,000 Units Subcutaneous Q8HRS Waylon Smith M.D.   5,000 Units at 07/14/21 0609   • norepinephrine (Levophed) 8 mg in 250 mL NS infusion (premix)  0-30 mcg/min Intravenous Continuous LOVE CadeO.    Stopped at 07/13/21 1345   • heparin injection 2,000 Units  2,000 Units Intravenous DIALYSIS PRN Kulwant Hodges M.D.   2,000 Units at 07/13/21 1030   • heparin injection 3,700 Units  3,700 Units Intracatheter DIALYSIS PRN Kulwant Hodges M.D.   3,700 Units at 07/13/21 1340   • gabapentin (NEURONTIN) capsule 300 mg  300 mg Oral TID ADE Cleary.BRIAN   300 mg at 07/14/21 0609   • atorvastatin (LIPITOR) tablet 40 mg  40 mg Oral QHS Fortino Tomlinson M.D.   40 mg at 07/13/21 2139   • calcitRIOL (ROCALTROL) capsule 0.25 mcg  0.25 mcg Oral DAILY Fortino Tomlinson M.D.   0.25 mcg at 07/14/21 0610   • clopidogrel (PLAVIX) tablet 75 mg  75 mg Oral DAILY Fortino Tomlinson M.D.   75 mg at 07/14/21 0609   • methimazole (TAPAZOLE) tablet 5 mg  5 mg Oral BID Fortino Tomlinson M.D.   5 mg at 07/14/21 0630   • omeprazole (PRILOSEC) capsule 20 mg  20 mg Oral BID Fortino Tomlinson M.D.   20 mg at 07/14/21 0609   • sevelamer carbonate (RENVELA) tablet 1,600 mg  1,600 mg Oral TID WITH MEALS Fortino Tomlinson M.D.   1,600 mg at 07/14/21 0634   • tamsulosin (FLOMAX) capsule 0.8 mg  0.8 mg Oral QHS Fortino Tomlinson M.D.   0.8 mg at 07/13/21 2139   • traZODone (DESYREL) tablet 50 mg  50 mg Oral QHS Fortino Tomlinson M.D.   50 mg at 07/13/21 2139   • senna-docusate (PERICOLACE or SENOKOT S) 8.6-50 MG per tablet 2 tablet  2 tablet Oral BID Fortino Tomlinson M.D.   2 tablet at 07/13/21 1736    And   • polyethylene glycol/lytes (MIRALAX) PACKET 1 Packet  1 Packet Oral QDAY PRN Fortino Tomlinson M.D.        And   • bisacodyl (DULCOLAX) suppository 10 mg  10 mg Rectal QDAY PRN Fortino Tomlinson M.D.       • guaiFENesin dextromethorphan (ROBITUSSIN DM) 100-10 MG/5ML syrup 10 mL  10 mL Oral Q6HRS PRN Fortino Tomlinson M.D.       • ondansetron (ZOFRAN) syringe/vial injection 4 mg  4 mg Intravenous Q4HRS PRN Fortino Tomlinson M.D.       • ondansetron (ZOFRAN ODT) dispertab 4 mg  4 mg Oral Q4HRS PRN Fortino Tomlinson M.D.       • insulin regular (HumuLIN R,NovoLIN R) injection  1-6 Units Subcutaneous 4X/DAY OLINDA BARILLAS  TK Tomlinson   1 Units at 07/12/21 2108    And   • glucose 4 g chewable tablet 16 g  16 g Oral Q15 MIN PRN Fortino Tomlinson M.D.   16 g at 07/13/21 0604    And   • dextrose 50% (D50W) injection 50 mL  50 mL Intravenous Q15 MIN PRN Fortino Tomlinson M.D.   50 mL at 07/14/21 0630       Fluids    Intake/Output Summary (Last 24 hours) at 7/14/2021 0913  Last data filed at 7/13/2021 2200  Gross per 24 hour   Intake 1270 ml   Output 2700 ml   Net -1430 ml       Laboratory          Recent Labs     07/12/21  0440 07/13/21  0530 07/14/21  0500   SODIUM 133* 133* 134*   POTASSIUM 4.8 5.1 5.0   CHLORIDE 98 97 99   CO2 28 26 27   BUN 32* 40* 22   CREATININE 5.05* 5.62* 4.00*   MAGNESIUM 2.1 2.2 1.9   PHOSPHORUS 3.9  --   --    CALCIUM 9.0 9.0 8.6     Recent Labs     07/12/21  0440 07/13/21  0530 07/14/21  0500   GLUCOSE 93 70 67     Recent Labs     07/12/21  0440 07/13/21  0530 07/14/21  0500   WBC 6.3 6.4 5.5     Recent Labs     07/12/21 0440 07/13/21  0530 07/14/21  0500   RBC 2.59* 2.52* 2.42*   HEMOGLOBIN 8.1* 8.0* 7.5*   HEMATOCRIT 26.4* 26.0* 25.1*   PLATELETCT 245 232 227       Imaging  X-Ray:  I have personally reviewed the images and compared with prior images.      Assessment/Plan  * Septic shock (HCC)  Assessment & Plan  This is Septic shock Present on admission  SIRS criteria identified on my evaluation include: Tachycardia, with heart rate greater than 90 BPM and Tachypnea, with respirations greater than 20 per minute  Source is unknown   Presentation includes: hypotension after modified sepsis bolus  Despite appropriate fluid resuscitation with crystalloid given per sepsis guidelines, the patient remains hypotensive with systolic blood pressure less than 90 or MAP less than 65  Hemodynamic support with additional fluids and IV vasopressors as needed to maintain a SBP of 90 or MAP of 65  IV antibiotics as appropriate for source of sepsis  Reassessment: I have reassessed the patient's hemodynamic status     Potential sources: L  hand dry gangrene/abscess. Doubt left BKA as this doesn't appear infected. Line infection is a possibility   MRSA screen is negative, discontinue vancomycin.   Continue piptazo   Pt has hx of ESBL Klebsiella in foot in the past. I to unasyn, S to piptazo.   Can target goal BP >90 or MAP >60.   On NE gtt today minimal dose at 2mcg/min  On midodrine  Follow cultures  Xray of left hand w/o evidence of OM. CRP is high, procal is high.   MRI hand is pending.   Depending on the result of MRI hand, if OM is present, may need prolonged duration of antibiotics    Acute on chronic respiratory failure (HCC)- (present on admission)  Assessment & Plan  Acute respiratory insufficiency - resolved.   Titrate supplemental O2 to maintain O2 saturation > 92%  Aggressive pulmonary hygiene  Encourage incentive spirometry    Pleural effusion- (present on admission)  Assessment & Plan  Cultures NGTD    Ischemia of finger- (present on admission)  Assessment & Plan  Dry gangrene of L hand  Warm to touch, erythematous  On piptazo  Wound care    Goals of care, counseling/discussion  Assessment & Plan  7/10 Dr. Gonda had extensive discussion with the patient regarding his condition.  We also called his wife, [place], on speaker phone.  He ultimately made the decision to have a short course of vasopressor administration to see if his BP improved.  He affirms his desire to be DNAR/DNI.    7/12 had long discussion with patient regarding goal of care. Patient will benefit from palliative care consult. Order was placed.     7/14 discussed with Palliative care regarding patient's goal of care.     Paroxysmal atrial fibrillation (HCC)- (present on admission)  Assessment & Plan  Rate controlled  Optimize electrolytes  Home NOAC    End stage renal disease on dialysis (HCC)- (present on admission)  Assessment & Plan  Continue iHD per schedule. Tuesday, Thursday and Saturday.   Monitor electrolytes and replete as needed    Type 2 diabetes mellitus with  kidney complication, with long-term current use of insulin (HCC)- (present on admission)  Assessment & Plan  Goal glucose 140-180  SSI + LA    CAD (coronary artery disease)- (present on admission)  Assessment & Plan  ASA + Statin        VTE:  Heparin  Ulcer: Not Indicated  Lines: Central Line  Ongoing indication addressed    I have performed a physical exam and reviewed and updated ROS and Plan today (7/14/2021). In review of yesterday's note (7/13/2021), there are no changes except as documented above.     Discussed patient condition and risk of morbidity and/or mortality with Hospitalist, RN, RT, Charge nurse / hot rounds and Patient    Patient can be transferred out of ICU  D/w Dr. Robles, [facility]  Will sign off, please call with questions

## 2021-07-14 NOTE — DISCHARGE PLANNING
Care Transition Team Discharge Planning    Anticipated Discharge Disposition: TBD    Action: Lsw spoke to palliative RN. She indicates pt's wife and son do not like the SNF vs Hospice vs HH suggestions for d/c plan.    They would like 24 hour care in the home. RN reported this does not exist unless they are able to pay for the services. They did not like this information either.    Lsw provided the following resources to the family via the RN prior to family's departure from bedside today:  ADSD, ADRC, CBC, PCA, WCSS     Barriers to Discharge: TBD    Plan: Lsw will continue to follow, and assist w/ d/c planning.

## 2021-07-15 PROBLEM — N40.0 BPH (BENIGN PROSTATIC HYPERPLASIA): Status: ACTIVE | Noted: 2021-01-01

## 2021-07-15 NOTE — PROGRESS NOTES
"[facility] -  PROGRESS NOTE               Author: Mario Hunter M.D. Date & Time: 7/15/2021  7:51 AM     HPI:  74 y/o man with ESRD HD T,TH,SAt presented with SOB. Pt has reccurrent pleural effusion. Pt /sp thoracentisis in the past.  Pt with recent ligation of left arm AVF  Pt makes a small amount of urine    DAILY NEPHROLOGY SUMMARY:  7/9: HD yesterday 1 liter UF, but transferred to ICU for low BP. He is on pressors now  7/10: Pt sitting up in bed, comfortable, L IJ CVC placed yesterday, on 15 mcg levo gtt, completed iHD today, resp status stable  7/11: remains on pressors, tolerated HD, no new c/o   7/12:  Remains on pressors, long discussion regarding GOC, overall malaise   7/13: No acute events, resting comfortably, dialysis planned ~11am  7/14: No acute events, Norepi weaned off. Ongoing GOC discussions with pt/family, sounds like hoping to optimize comfort going forward   7/15: Ongoing GOC discussions. Palliative re-involved yesterday. Long talk with wife and Adalid. Goal remains to optimize at home. Ongoni hand pain. No CP or SOB. Seen on HD.    REVIEW OF SYSTEMS:    10 point ROS performed, negative other than stated above    PMH/PSH/SH/FH: Reviewed and unchanged since admission note  CURRENT MEDICATIONS: Reviewed from admission to present day    VS:  /53   Pulse 97   Temp 36 °C (96.8 °F) (Temporal)   Resp 15   Ht 1.676 m (5' 6\")   Wt 73.8 kg (162 lb 11.2 oz)   SpO2 95%   BMI 25.54 kg/m²   Physical Exam  Vitals and nursing note reviewed.   Constitutional:       General: He is not in acute distress.  HENT:      Head: Normocephalic and atraumatic.   Eyes:      General:         Right eye: No discharge.         Left eye: No discharge.      Extraocular Movements: Extraocular movements intact.      Conjunctiva/sclera: Conjunctivae normal.   Cardiovascular:      Rate and Rhythm: Normal rate and regular rhythm.      Heart sounds: Normal heart sounds.   Pulmonary:      Effort: " Pulmonary effort is normal.      Breath sounds: No rales.   Abdominal:      General: Abdomen is flat. There is no distension.      Palpations: Abdomen is soft.      Tenderness: There is no abdominal tenderness.   Musculoskeletal:      Cervical back: Normal range of motion and neck supple.      Right lower leg: No edema.      Left lower leg: No edema.      Comments: B/l BKA   Skin:     General: Skin is warm and dry.      Comments: Necrotic L fingers/dry gangrene   Neurological:      General: No focal deficit present.      Mental Status: He is alert and oriented to person, place, and time.   Psychiatric:         Mood and Affect: Mood normal.         Behavior: Behavior normal.         Thought Content: Thought content normal.         Judgment: Judgment normal.         Fluids:  In: 800 [P.O.:800]  Out: 325     LABS:  Recent Labs     07/13/21  0530 07/14/21  0500 07/15/21  0415   SODIUM 133* 134* 135   POTASSIUM 5.1 5.0 6.0*   CHLORIDE 97 99 99   CO2 26 27 26   GLUCOSE 70 67 86   BUN 40* 22 27*   CREATININE 5.62* 4.00* 5.16*   CALCIUM 9.0 8.6 8.9     Available labs, imaging and clinical documentation reviewed.     IMPRESSION:  # ESRD, dependent on HD qTTS    # Hypotension, acute on chronic  - Currently requiring vasopressor therapy  - On 15 mg midodrine TID    # Anemia of CKD, below target   - ARMANDO with HD, goal Hgb 10-11    # PVD, severe  - On Plavix     # Acute on chronic respiratory failure, improved   - s/p R thora 7/8 with 2 L removed  - On supp O2 via NC    # HFrEF  - EF 20%%     # CKD-MBD  - phos and Ca wnl  - On calcitriol and sevelamer     # Hyperthyroidism, on methimazole    # HLD, on statin therapy    # BPH, on tamsulosin    # CAD: History of 80% ostial LAD in-stent restenosis    # Dry Gangrene of L. Hand   - s/p ligation LUE AVF 6/23     Plan:  -HD today, Continue iHD qTTS/PRN  -ongoing GOC discussions, pt hoping to optimize comfort/time at home. Best option may to offer 6-9 treatments of HD on hospice. Unclear  if family could adequately transport to outpt hospice  -Dose all meds for ESRD  -Continue calcitriol, and sevelamer with meals  -ARMANDO with HD  -Transfuse PRN Hgb <7   -Strict I/O    Pt is at high risk for further morbidity/mortality with poor overall prognosis     Thank you

## 2021-07-15 NOTE — DISCHARGE PLANNING
Notified by team that MD placed hospice referral. Attempted to meet with patient at bedside to discuss hospice. Patient sleeping. Will round back shortly to discuss hospice choice.     1230 Patient still sleeping.     1345 Met with patient, spouse [place], and son at bedside to discuss hospice choice. Discussed the hospice referral in length - patient still would like to receive dialysis at this time. Discussed patient needing medical transport to and from dialysis. Provided [place] and Adalid with hospice choice, home health choice and [facility] medical transport information. Family would like to discuss their options. Their biggest concern is that if the patient goes under hospice care then he will no longer be able to recieve dialysis.

## 2021-07-15 NOTE — CARE PLAN
The patient is Stable - Low risk of patient condition declining or worsening    Shift Goals  Clinical Goals: stable BP  Patient Goals: comfort/pain control  Family Goals: chase    Progress made toward(s) clinical / shift goals:    Problem: Skin Integrity  Goal: Skin integrity is maintained or improved  Outcome: Progressing     Problem: Pain - Standard  Goal: Alleviation of pain or a reduction in pain to the patient’s comfort goal  Outcome: Progressing       Patient is not progressing towards the following goals:

## 2021-07-15 NOTE — THERAPY
Physical Therapy   Daily Treatment     Patient Name: Luis Sepulveda  Age:  75 y.o., Sex:  male  Medical Record #: [id number]  Today's Date: 7/15/2021     Precautions: Fall Risk, Swallow Precautions ( See Comments)    Assessment    RN able to obtain briefs and pt agreeable to work with PT. Pt more confused today, requiring greater time to initiate tasks and respond to questions. Pt required greater assist to mobilize in bed and to EOB. Able to don prosthetic while EOB with Mod A. Attempted sit <> stand with FWW x3 trials, unable to achieve standing due to L hand pain limiting pt's ability to push. Discussed use of platform for FWW next session to allow him to use elbow/forearm rather than hand, pt agreeable. PT will continue to follow.     Plan    Continue current treatment plan.    DC Equipment Recommendations: Unable to determine at this time  Discharge Recommendations: Recommend post-acute placement for additional physical therapy services prior to discharge home       07/15/21 1611   Precautions   Precautions Fall Risk;Swallow Precautions ( See Comments)   Comments B BKA, gangrene L fingers   Vitals   Blood Pressure  (!) 86/27  (supine post therapy intervention)   O2 (LPM) 2   O2 Delivery Device Silicone Nasal Cannula   Pain 0 - 10 Group   Therapist Pain Assessment During Activity;Nurse Notified  (L hand pain, not rated)   Cognition    Cognition / Consciousness WDL   Level of Consciousness Alert   Comments agreeable to work with PT as brief available   Balance   Sitting Balance (Static) Fair +   Sitting Balance (Dynamic) Fair   Weight Shift Sitting Fair   Skilled Intervention Verbal Cuing;Tactile Cuing;Compensatory Strategies   Comments able to don R prosthetic while EOB with Mod A   Gait Analysis   Gait Level Of Assist Unable to Participate   Bed Mobility    Supine to Sit Moderate Assist   Sit to Supine Minimal Assist   Scooting Moderate Assist  (seated EOB)   Rolling Moderate Assist to Rt.;Moderate Assist  to Lt.   Skilled Intervention Verbal Cuing;Sequencing;Compensatory Strategies   Comments moderate cues and extra time to initiate   Functional Mobility   Sit to Stand Unable to Participate   Bed, Chair, Wheelchair Transfer Unable to Participate   Comments attempted standing x3 trails but unable clear buttocks   Short Term Goals    Short Term Goal # 1 Pt will perform bed mobility with supv within 6 visits   Goal Outcome # 1 goal not met   Short Term Goal # 2 Pt will transfer bed<->W/C with supv within 6 visits in order to return home   Goal Outcome # 2 Goal not met   Short Term Goal # 3 Pt will transfer sit to stand with min A with R prosthesis and FWW within 6 visits in order to promote return home   Goal Outcome # 3 Goal not met   Anticipated Discharge Equipment and Recommendations   DC Equipment Recommendations Unable to determine at this time   Discharge Recommendations Recommend post-acute placement for additional physical therapy services prior to discharge home

## 2021-07-15 NOTE — THERAPY
PT treatment attempted. Pt reports he has been having more loose bowels and does not feel comfortable mobilizing without a brief. Attempted to locate brief on the floor, unable to find. RN to order brief to allow pt more comfort during PT session. Of note, pt more delayed with responses today compared to yesterday. Unsure if related to morning dialysis, RN aware.     Jocelin Hughes, PT, DPT [facility] [phone]

## 2021-07-15 NOTE — THERAPY
Speech Language Pathology  Daily Treatment     Patient Name: Luis Sepulveda  Age:  75 y.o., Sex:  male  Medical Record #: [id number]  Today's Date: 7/15/2021     Precautions: Fall Risk, Swallow Precautions (See Comments)  Comments: B BKA, gangrene L fingers    Assessment    Pt was seen today for f/u dysphagia tx and therapeutic feeding session with breakfast tray of liquidised solids, mildly thick liquids. Per RN, Pt hasn't eaten PO or taken meds this morning yet, as he just finished HD. Pt was AAOx4 and pleasant, but slightly confused, throughout session. Pt initially refused all breakfast tray items, but was agreeable to PO trials of iced water. Pt consumed single straw sips with no overt clinical s/sx of aspiration; however, O2 SATS dropped down to mid-80's (from high 90's) intermittently which may be concerning for silent aspiration (which also was noted during FEES 7/12/21.) Pt refused all mildly thick liquids, but was agreeable to pudding. Pt consumed small bites of pudding with no coughing/choking, but required verbal cues to use multiple swallows (to clear suspected pharyngeal residue.) Pt then requested cream of wheat with sugar and thickened milk, and fed himself with slow feeding rate. Pt again had intermittent O2 desaturations but with protective cough/clear after the swallow and double swallows, these episodes resolved. Pt then began discussing his poor prognosis and desire to speak with his medical team and wife re: POC. RN notified.    At this time, recommend to con't current diet of Liquidised solids, Mildly thick liquids and meds crushed in puree given STRICT adherence to safe swallow precautions. Please monitor with meals and hold PO if any difficulties/concerns or change in status. Thank you. SLP following as appropriate, given possible transition to hospice.    Plan    Continue current treatment plan.    Discharge Recommendations: Recommend post-acute placement for additional speech therapy  services prior to discharge home    Objective     07/15/21 0925   Cognitive-Linguistic   Level of Consciousness Alert   Dysphagia    Positioning / Behavior Modification Cough / Clear after Swallow;Self Monitoring;Multiple Swallows;Alternate Solids and Liquids   Other Treatments Breakfast tray of LQ3/MT2, trials thins, pudding   Diet / Liquid Recommendation Liquidised (3) - (Nectar Thick Full Liquid);Mildly Thick (2) - (Nectar Thick)   Nutritional Liquid Intake Rating Scale Thickened beverages (mildly thick unless otherwise specified)   Nutritional Food Intake Rating Scale Total oral diet of a single consistency   Nursing Communication Swallow Precaution Sign Posted at Head of Bed   Skilled Intervention Compensatory Strategies;Verbal Cueing   Recommended Route of Medication Administration   Medication Administration  Crush all Medications in Puree   Short Term Goals   Short Term Goal # 1 Patient will consume prefeeding trials with SLP only with no overt s/sx of aspiration.    Goal Outcome # 1 Goal met, new goal added   Short Term Goal # 1 B  NEW after FEES: Patient will consume LQ3/MT2 diet with assistance with no overt s/sx of aspiration.    Goal Outcome  # 1 B Progressing as expected   Education Group   Education Provided Dysphagia   Dysphagia Patient Response Patient;Acceptance;Explanation;Verbal Demonstration;Action Demonstration;Reinforcement Needed

## 2021-07-15 NOTE — CARE PLAN
The patient is Watcher - Medium risk of patient condition declining or worsening    Shift Goals  Clinical Goals: Stable BP during and after dialysis  Patient Goals: Comfort  Family Goals: chase    Progress made toward(s) clinical / shift goals:     Patient is not progressing towards the following goals:      Problem: Care Map:  Admission Optimal Outcome for the Heart Failure Patient  Goal: Admission:  Optimal Care of the heart failure patient  Outcome: Progressing

## 2021-07-15 NOTE — PROGRESS NOTES
HD treatment today per routine order.Treatment tolerated well.BP soft the entire time.Net UF removed 1500 ml.CVC locked with heparin.Report given to primary Rn.

## 2021-07-16 NOTE — THERAPY
"Occupational Therapy  Daily Treatment     Patient Name: Luis Sepulveda  Age:  75 y.o., Sex:  male  Medical Record #: [id number]  Today's Date: 7/15/2021       Precautions: Fall Risk, Swallow Precautions   Comments: B BKA; gangrene to L digits     Assessment    Pt seen for OT tx. Presents with increased confusion this session; tangential speech. Pt able to recall 1/4 exercises for L hand. Demos increased PIP ROM. Introduced universal cuff and trained on use for self-feeding with dominant L hand; fair initial demo. Ongoing training on L hand therapeutic exercise. Pt presents with new intermittent, involuntary, jerky movements BUE during functional use. Pt mobilized to EOB with mod A. Applied R prosthesis with max A. Attempted sit to stand x 2 with 2-person assist. Pt able to engage RLE and trunk, but lift-off from EOB limited by pain to L hand when pushing on handle of FWW. Pt is appropriate for training on slide board transfer due to anticipate ongoing difficulty progressing sit to stands. Pt returned to supine and completed multiple rolls B directions for hygiene and linen change due to BM incontinence. Notes indicate possible DC home with OP HD. Pt is unable to transfer to  at this time. Would require Francisca lift for transfers. Will continue to benefit from acute OT.     Plan    Continue current treatment plan.    DC Equipment Recommendations: Unable to determine at this time  Discharge Recommendations: Recommend post-acute placement for additional occupational therapy services prior to discharge home    Subjective    \"Which organizations are supporting me?\"     Objective       07/15/21 1618   Cognition    Level of Consciousness Alert   New Learning Impaired   Attention Impaired   Sequencing Impaired   Comments Presents with confusion this session, making tangential statements    Active ROM Upper Body   Comments L digits 2-4 absent flexion to PIP/DIP, MPs flex to 80 degrees (due to gangrene)   Sitting Upper " Body Exercises   Comments A/SROM to L digits   Other Treatments   Other Treatments Provided Trained on use of u-cuff for self-feeding using dominant L hand    Balance   Sitting Balance (Static) Fair   Sitting Balance (Dynamic) Fair -   Weight Shift Sitting Fair   Bed Mobility    Supine to Sit Moderate Assist   Sit to Supine Minimal Assist   Scooting Moderate Assist  (lateral scoot towards HOB)   Rolling Moderate Assist to Rt.;Moderate Assist to Lt.  (using bed rails )   Activities of Daily Living   Eating Minimal Assist  (scooping puree using u-cuff on L hand )   Toileting Total Assist  (incontinent of BM; total A for hygiene)   Functional Mobility   Sit to Stand Unable to Participate   Bed, Chair, Wheelchair Transfer Unable to Participate   Mobility Supine > < EOB, sitting balance, attempted stands (unable)   Short Term Goals   Short Term Goal # 1 Pt will complete ADL transfers with Simone   Goal Outcome # 1 Goal not met   Short Term Goal # 2 Pt will complete LB dressing with min A bed level   (7/15 goal updated )   Goal Outcome # 2 Goal not met   Short Term Goal # 3 Pt will complete toileting with min A  (7/15 goal updated )   Goal Outcome # 3 Goal not met   Short Term Goal # 4 Pt will self-feed using L hand and AE with supv    Goal Outcome # 4 Progressing as expected

## 2021-07-16 NOTE — DISCHARGE PLANNING
Anticipated Discharge Disposition: SNF vs. HH     Action: LSW met with patient and wife at bedside to speak about dc plan. They report that they have not decided on SNF vs. HH. Patient and wife agreed to have SNF referrals sent out to see if patient's dialysis could be accommodated. Spouse reports that she has talked to [facility]  and likes their company but has not decided. Wife also looking into private caregivers. Wife says their biggest barrier to taking patient home is getting patient transported to dialysis. Wife to get [facility] quote from house to dialysis center to help her inform their decision. Spouse and patient also report that they are not ready for hospice at this time and want to continue dialysis.    Barriers to Discharge: SNF acceptance, dialysis patient, decision on SNF vs. HH    Plan: LSW to update medical team and follow up with family

## 2021-07-16 NOTE — PROGRESS NOTES
Assumed care of patient. Bedside report, received from Julia MONTOYA. Updated POC, call light within reach, and fall precautions in place. Bed locked and and in lowest position. Patient instructed to call for assistance before getting out of bed. All questions answered and needs addressed.

## 2021-07-16 NOTE — FLOWSHEET NOTE
4 Eyes Skin Assessment Completed by JAN Garcia and JAN Dumont.    Head WDL  Ears WDL  Nose WDL  Mouth WDL  Neck Redness  Breast/Chest WDL  Shoulder Blades WDL  Spine WDL  (R) Arm/Elbow/Hand Discoloration  (L) Arm/Elbow/Hand Discoloration  Abdomen WDL  Groin WDL  Scrotum/Coccyx/Buttocks Redness and Blanching  (R) Leg WDL  (L) Leg Redness, Non-Blanching and Scab  (R) Heel/Foot/Toe BKA  (L) Heel/Foot/Toe BKA        Devices In Places Tele Box, Central Line and Nasal Cannula      Interventions In Place Gray Ear Foams, Sacral Mepilex, Waffle Overlay, Pillows and Q2 Turns    Possible Skin Injury No    Pictures Uploaded Into Epic N/A  Wound Consult Placed N/A  RN Wound Prevention Protocol Ordered No

## 2021-07-16 NOTE — PROGRESS NOTES
Hospital Medicine Daily Progress Note    Date of Service  7/16/2021    Chief Complaint  Shortness of breath and cough    Hospital Course  Luis Sepulveda is a 75 y.o. male w/ hx of CAD w/ stent, HFrEF:30%, DM,ESRD on HD, HTN, Afib, PVD w/ bilateral BKA admitted 7/7/2021 with acute respiratory failure despite home 4L oxygen and was found to have a right sided pleural effusion and volume overload.  He had a thoracentesis on 7/8/21 w/ 2050ml of fluids removed. After his thoracentesis he became hypotensive and required pressor support in the ICU.  He was weaned off norepinephrine on 7/13am.    Interval Problem Update  7/16:   I have confirmed with the patient that he wants to continue with dialysis at this time    Patient currently now on the telemetry floor    No new overnight complaints or issues    With midodrine on board patient blood pressure is within normal limits    PT and OT are both recommended postacute therapy    Casediscussed with  and patient has been referred to a skilled facility    Case discussed with Dr. Tomlin nephrology    I have personally seen and examined the patient at bedside. I discussed the plan of care with patient  Consultants/Specialty  renal    Code Status  DNAR/DNI    Disposition  Patient is medically cleared.   Anticipate discharge to to skilled nursing facility.  I have placed the appropriate orders for post-discharge needs.    Review of Systems  Review of Systems   Constitutional: Negative for fever and malaise/fatigue.   HENT: Negative for congestion.    Eyes: Negative for blurred vision.   Respiratory: Negative for cough and shortness of breath.    Cardiovascular: Negative for chest pain and palpitations.   Gastrointestinal: Negative for abdominal pain and nausea.   Musculoskeletal: Negative for joint pain.   Neurological: Negative for speech change and headaches.   Psychiatric/Behavioral: The patient is not nervous/anxious.         Physical Exam  Temp:  [36.2 °C  (97.2 °F)-37.1 °C (98.8 °F)] 37.1 °C (98.8 °F)  Pulse:  [81-99] 99  Resp:  [15-18] 18  BP: ()/(27-57) 104/57  SpO2:  [93 %-98 %] 97 %    Physical Exam  Vitals reviewed.   Constitutional:       Appearance: Normal appearance. He is not diaphoretic.   HENT:      Head: Normocephalic and atraumatic.      Nose: Nose normal.      Mouth/Throat:      Mouth: Mucous membranes are moist.      Pharynx: No oropharyngeal exudate.   Eyes:      General: No scleral icterus.        Right eye: No discharge.         Left eye: No discharge.      Extraocular Movements: Extraocular movements intact.      Conjunctiva/sclera: Conjunctivae normal.   Cardiovascular:      Rate and Rhythm: Normal rate and regular rhythm.      Pulses:           Radial pulses are 2+ on the right side and 2+ on the left side.      Heart sounds: No murmur heard.     Pulmonary:      Effort: Pulmonary effort is normal. No respiratory distress.      Breath sounds: Normal breath sounds. No wheezing or rales.   Abdominal:      General: Bowel sounds are normal. There is no distension.      Palpations: Abdomen is soft.   Musculoskeletal:         General: No swelling or tenderness.      Cervical back: No tenderness. No muscular tenderness.      Comments: Bilateral BKA.    gangrenous changes left 2nd, 3rd and 4th fingers   Skin:     Coloration: Skin is not jaundiced or pale.   Neurological:      General: No focal deficit present.      Mental Status: He is alert and oriented to person, place, and time. Mental status is at baseline.      Cranial Nerves: No cranial nerve deficit.   Psychiatric:         Mood and Affect: Mood normal.         Behavior: Behavior normal.         Fluids  No intake or output data in the 24 hours ending 07/16/21 0924    Laboratory  Recent Labs     07/14/21  0500 07/15/21  0415 07/16/21  0400   WBC 5.5 5.6 5.5   RBC 2.42* 2.53* 2.53*   HEMOGLOBIN 7.5* 8.0* 7.8*   HEMATOCRIT 25.1* 26.2* 25.8*   .7* 103.6* 102.0*   MCH 31.0 31.6 30.8   MCHC  29.9* 30.5* 30.2*   RDW 55.9* 54.9* 53.1*   PLATELETCT 227 247 258   MPV 9.1 9.0 9.1     Recent Labs     07/14/21  0500 07/15/21  0415 07/16/21  0400   SODIUM 134* 135 136   POTASSIUM 5.0 6.0* 5.4   CHLORIDE 99 99 99   CO2 27 26 30   GLUCOSE 67 86 80   BUN 22 27* 21   CREATININE 4.00* 5.16* 3.85*   CALCIUM 8.6 8.9 8.9                   Imaging  MR-HAND - W/O LEFT   Final Result      1.  No evidence of osteomyelitis.      2.  Soft tissue loss dorsally and distally involving the second, third and fourth digits.      DX-CHEST-PORTABLE (1 VIEW)   Final Result      1. Improving moderate right pleural effusion.   2. Allowing for differences in technique, the remainder is stable.      DX-HAND 3+ LEFT   Final Result      1.  Multiple sites of osteoarthritis      2.  Small vessel atherosclerotic plaque      3.  No radiographic evidence for osteomyelitis      DX-CHEST-PORTABLE (1 VIEW)   Final Result      1.  Left internal jugular catheter appears appropriately located      2.  Pulmonary edema      3.  Right pleural effusion, probably moderate in size      4.  Bilateral atelectasis      DX-CHEST-PORTABLE (1 VIEW)   Final Result      1.  Interval increased aeration and improvement of RIGHT pleural effusion.   2.  No pneumothorax.         US-THORACENTESIS PUNCTURE RIGHT   Final Result   Addendum 1 of 1   Addendum issued to correct volume of fluid withdrawn.  Actual volume of    pleural fluid withdrawn was 2050 mL.      Final      1. Ultrasound guided right sided diagnostic/therapeutic thoracentesis.      2. 2450 mL of fluid withdrawn.      DX-CHEST-PORTABLE (1 VIEW)   Final Result      1.  Moderate to large right pleural effusion with overlying atelectasis/consolidation.   2.  Interstitial prominence likely represents interstitial edema.   3.  Stable cardiomegaly.           Assessment/Plan  * Septic shock (HCC)- (present on admission)  Assessment & Plan  This is Sepsis Present on admission  SIRS criteria identified on my  evaluation include: Tachycardia, with heart rate greater than 90 BPM  Source is lung  Sepsis protocol initiated  Fluid resuscitation ordered per protocol  IV antibiotics as appropriate for source of sepsis- completed  While organ dysfunction may be noted elsewhere in this problem list or in the chart, degree of organ dysfunction does not meet CMS criteria for severe sepsis    Cont midodrine          Acute on chronic respiratory failure (HCC)- (present on admission)  Assessment & Plan  Likely sec to R pleural effusion and fluid overload  S/p Thoracentesis w/ 2L removed  Nephrology consulting for HD  Duoneb, RT protocol  Typically on 4L O2 baseline  Cont monitoring respiratory status    BPH (benign prostatic hyperplasia)- (present on admission)  Assessment & Plan  tamsulosin for ongoing active treatment.  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    Status post below-knee amputation of both lower extremities (HCC)- (present on admission)  Assessment & Plan  S/p BKA b/l. Wound care to left leg.  Right stump unremarkalble    DNR (do not resuscitate)- (present on admission)  Assessment & Plan  Per POLST: patient is DNR/DNI    Acute on chronic systolic congestive heart failure (HCC)- (present on admission)  Assessment & Plan  LVEF 30%  Holding Lisinopril, carvedilol, lasix  2 g salt  Midodrine for hypotension  Adjust meds as needed    Goals of care, counseling/discussion- (present on admission)  Assessment & Plan  Referred to snf    Hospice eval    Peripheral vascular disease (HCC)- (present on admission)  Assessment & Plan  Hx of PAD with escar noted on L thumb, index and middle finger  7/14 MRI Hand without sign of osteomyelitis    Paroxysmal atrial  fibrillation (HCC)- (present on admission)  Assessment & Plan  Rate control and monitor on telemetry  Holding coreg due to hypotension and currently on midodrine    End stage renal disease on dialysis (HCC)- (present on admission)  Assessment & Plan  Cont dialysis  Monitor labs/vitals.    Type 2 diabetes mellitus with kidney complication, with long-term current use of insulin (HCC)- (present on admission)  Assessment & Plan  On SSI and hypoglycemic protocol  A1c 5.7 2 months ago  Diabetic diet    Anemia- (present on admission)  Assessment & Plan  Of chronic kidney disease  EPO per nephrology  Monitor cbc    CAD (coronary artery disease)- (present on admission)  Assessment & Plan  History of  Continue outpatient meds: plavix, statin  Holding lisinopril and coreg due to hypotension       VTE prophylaxis: heparin ppx    I have performed a physical exam and reviewed and updated ROS and Plan today (7/16/2021). In review of yesterday's note (7/15/2021), there are no changes except as documented above.

## 2021-07-16 NOTE — DISCHARGE PLANNING
Lsw attempted to meet with pt to get SNF choice. Pt preoccupied with RN. Lsw left vm for spouse [place].

## 2021-07-16 NOTE — DISCHARGE PLANNING
Received Choice form at [phone]  [facility]  Referral sent per Choice form @ [phone]

## 2021-07-16 NOTE — PROGRESS NOTES
"[facility] -  PROGRESS NOTE               Author: Mario Hunter M.D. Date & Time: 7/16/2021  7:34 AM     HPI:  74 y/o man with ESRD HD T,TH,SAt presented with SOB. Pt has reccurrent pleural effusion. Pt /sp thoracentisis in the past.  Pt with recent ligation of left arm AVF  Pt makes a small amount of urine    DAILY NEPHROLOGY SUMMARY:  7/9: HD yesterday 1 liter UF, but transferred to ICU for low BP. He is on pressors now  7/10: Pt sitting up in bed, comfortable, L IJ CVC placed yesterday, on 15 mcg levo gtt, completed iHD today, resp status stable  7/11: remains on pressors, tolerated HD, no new c/o   7/12:  Remains on pressors, long discussion regarding GOC, overall malaise   7/13: No acute events, resting comfortably, dialysis planned ~11am  7/14: No acute events, Norepi weaned off. Ongoing GOC discussions with pt/family, sounds like hoping to optimize comfort going forward   7/15: Ongoing GOC discussions. Palliative re-involved yesterday. Long talk with wife and Adalid. Goal remains to optimize at home. Ongoni hand pain. No CP or SOB. Seen on HD.  7/16: Transferred out of the ICU, tolerated HD-net neg 1.5L, patient decided against hospice for now    REVIEW OF SYSTEMS:    10 point ROS performed, negative other than stated above    PMH/PSH/SH/FH: Reviewed and unchanged since admission note  CURRENT MEDICATIONS: Reviewed from admission to present day    VS:  /57   Pulse 99   Temp 37.1 °C (98.8 °F) (Temporal)   Resp 18   Ht 1.702 m (5' 7\")   Wt 73.8 kg (162 lb 11.2 oz)   SpO2 97%   BMI 25.48 kg/m²   Physical Exam  Vitals and nursing note reviewed.   Constitutional:       General: He is not in acute distress.  HENT:      Head: Normocephalic and atraumatic.   Eyes:      General:         Right eye: No discharge.         Left eye: No discharge.      Extraocular Movements: Extraocular movements intact.      Conjunctiva/sclera: Conjunctivae normal.   Cardiovascular:      Rate and " Rhythm: Normal rate and regular rhythm.      Heart sounds: Normal heart sounds.   Pulmonary:      Effort: Pulmonary effort is normal.      Breath sounds: No rales.   Abdominal:      General: Abdomen is flat. There is no distension.      Palpations: Abdomen is soft.      Tenderness: There is no abdominal tenderness.   Musculoskeletal:      Cervical back: Normal range of motion and neck supple.      Right lower leg: No edema.      Left lower leg: No edema.      Comments: B/l BKA   Skin:     General: Skin is warm and dry.      Comments: Necrotic L fingers/dry gangrene   Neurological:      General: No focal deficit present.      Mental Status: He is alert and oriented to person, place, and time.   Psychiatric:         Mood and Affect: Mood normal.         Behavior: Behavior normal.         Thought Content: Thought content normal.         Judgment: Judgment normal.         Fluids:  In: -   Out: 1500     LABS:  Recent Labs     07/14/21  0500 07/15/21  0415 07/16/21  0400   SODIUM 134* 135 136   POTASSIUM 5.0 6.0* 5.4   CHLORIDE 99 99 99   CO2 27 26 30   GLUCOSE 67 86 80   BUN 22 27* 21   CREATININE 4.00* 5.16* 3.85*   CALCIUM 8.6 8.9 8.9     Available labs, imaging and clinical documentation reviewed.     IMPRESSION:  # ESRD, dependent on HD qTTS    # Hypotension, acute on chronic  - Currently requiring vasopressor therapy  - On 15 mg midodrine TID    # Anemia of CKD, below target   - ARMANDO with HD, goal Hgb 10-11    # PVD, severe  - On Plavix     # Acute on chronic respiratory failure, improved   - s/p R thora 7/8 with 2 L removed  - On supp O2 via NC    # HFrEF  - EF 20%%     # CKD-MBD  - phos and Ca wnl  - On calcitriol and sevelamer     # Hyperthyroidism, on methimazole    # HLD, on statin therapy    # BPH, on tamsulosin    # CAD: History of 80% ostial LAD in-stent restenosis    # Dry Gangrene of L. Hand   - s/p ligation LUE AVF 6/23     Plan:  -No HD today, Continue iHD qTTS/PRN  -ongoing GOC discussions, Pt/family  declining hospice for now. OK for discharge from renal standpoint when medically cleared. Unclear if he can tolerate outpt HD but time will tell  -Dose all meds for ESRD  -Continue calcitriol, and sevelamer with meals  -ARMANDO with HD  -Transfuse PRN Hgb <7   -Strict I/O    Pt is at high risk for further morbidity/mortality with poor overall prognosis     Thank you

## 2021-07-16 NOTE — ASSESSMENT & PLAN NOTE
This is Sepsis Present on admission  SIRS criteria identified on my evaluation include: Tachycardia, with heart rate greater than 90 BPM  Source is lung  Sepsis protocol initiated  Fluid resuscitation ordered per protocol  IV antibiotics as appropriate for source of sepsis- completed  While organ dysfunction may be noted elsewhere in this problem list or in the chart, degree of organ dysfunction does not meet CMS criteria for severe sepsis  Cont midodrine  Resolved

## 2021-07-16 NOTE — CARE PLAN
The patient is Stable - Low risk of patient condition declining or worsening    Shift Goals  Clinical Goals: (P) Stable BP during and after dialysis  Patient Goals: (P) Comfort  Family Goals: chase      Problem: Skin Integrity  Goal: Skin integrity is maintained or improved  Outcome: Progressing     Problem: Pain - Standard  Goal: Alleviation of pain or a reduction in pain to the patient’s comfort goal  Outcome: Progressing     Problem: Fall Risk  Goal: Patient will remain free from falls  Outcome: Progressing         Progress made toward(s) clinical / shift goals:  Pt educated on turning Q2 hours to improve skin integrity and comfort. Education provided on calling for assistance. Fall precautions in place.

## 2021-07-16 NOTE — CARE PLAN
The patient is Watcher - Medium risk of patient condition declining or worsening    Shift Goals  Clinical Goals: Stable BP during and after dialysis  Patient Goals: Comfort  Family Goals: N/A    Problem: Skin Integrity  Goal: Skin integrity is maintained or improved  Outcome: Progressing     Problem: Fall Risk  Goal: Patient will remain free from falls  Outcome: Progressing

## 2021-07-16 NOTE — PROGRESS NOTES
MONITOR SUMMARY  Intervals .15/.13/.40   Rhythm SR-ST   Ectopy (R/O) PVC, coup, big

## 2021-07-17 NOTE — DISCHARGE PLANNING
Anticipated Discharge Disposition: SNF     Action: [facility]  DPA will call other SNFs to see what SNFS are able to provide dialysis tranfers as dialysis is the barrier     Barriers to Discharge: Wait for an accepting SNF. Pt's dialysis needs are a barrier for SNF acceptance.     Plan: Wait for an accepting SNF.

## 2021-07-17 NOTE — DISCHARGE PLANNING
Agency[facility]Facility Name: [facility]  Outcome: Left vmail regarding acceptance of a dialysis pt    Agency[facility] Name: [facility]  Spoke To: Bernice  Outcome: Bernice states that none of the [facility] SNF's are accepting dialysis pt's at this time    Agency/Facility Name: [facility]  Outcome: Left vmail regarding dialysis beds

## 2021-07-17 NOTE — PROGRESS NOTES
[facility] Progress Note      HD today x 3 hours per Dr. Hunter . Initiated at 0727 and ended at 1027.   Patient AO X3-4. Sleepy during tx. Labile BP. UF limited due to hypotension.  Patient stable post tx. BS checked, 89mg/dL. Patient ate breakfast.    UF Net: 1500  mL    See paper flowsheet for details.      L IJ intact and patent with good flow during dialysis. No s/sx of infection, no redness nor bleeding noted on the CVC site. CVC dressing clean, dry and intact.   Blood returned and CVC port flushed with NS and Heparin 1000 units/mL used  to lock catheter given per designated amount. CVC port clamped, capped and labeled accordingly.     Dressing changed per protocol.        Report given to Primary ANDREY Patterson RN.

## 2021-07-17 NOTE — PROGRESS NOTES
4 Eyes Skin Assessment Completed by Madison JEAN RN and Jeb BASHIR RN.    Head WDL  Ears WDL  Nose WDL  Mouth WDL  Neck Redness and Incision, IJ central line in place and hemodialysis cath, dressing changed and intact  Breast/Chest WDL  Shoulder Blades WDL  Spine WDL  (R) Arm/Elbow/Hand scabbing to fingers  (L) Arm/Elbow/Hand blackened, gangrenous fingers  Abdomen WDL  Groin Redness and Blanching  Scrotum/Coccyx/Buttocks Redness and Blanching  (R) Leg Redness, Blanching, Scab and Incision  (L) Leg Redness and Blanching  (R) Heel/Foot/Toe: below knee amputation, stump pale, pink, intact, and blanching  (L) Heel/Foot/Toe: below knee amputation, stump with darkened, raised scabbing          Devices In Places Tele Box, Pulse Ox and Central Line      Interventions In Place Gray Ear Foams, NC W/Ear Foams, Waffle Overlay, Pillows, Barrier Cream, Heels Loaded W/Pillows and Pressure Redistribution Mattress    Possible Skin Injury Yes    Pictures Uploaded Into Epic No, needs to be completed  Wound Consult Placed Yes  RN Wound Prevention Protocol Ordered Yes

## 2021-07-17 NOTE — PROGRESS NOTES
"[facility] -  PROGRESS NOTE               Author: Mario Hunter M.D. Date & Time: 7/17/2021  7:30 AM     HPI:  76 y/o man with ESRD HD T,TH,SAt presented with SOB. Pt has reccurrent pleural effusion. Pt /sp thoracentisis in the past.  Pt with recent ligation of left arm AVF  Pt makes a small amount of urine    DAILY NEPHROLOGY SUMMARY:  7/9: HD yesterday 1 liter UF, but transferred to ICU for low BP. He is on pressors now  7/10: Pt sitting up in bed, comfortable, L IJ CVC placed yesterday, on 15 mcg levo gtt, completed iHD today, resp status stable  7/11: remains on pressors, tolerated HD, no new c/o   7/12:  Remains on pressors, long discussion regarding GOC, overall malaise   7/13: No acute events, resting comfortably, dialysis planned ~11am  7/14: No acute events, Norepi weaned off. Ongoing GOC discussions with pt/family, sounds like hoping to optimize comfort going forward   7/15: Ongoing GOC discussions. Palliative re-involved yesterday. Long talk with wife and Adalid. Goal remains to optimize at home. Ongoni hand pain. No CP or SOB. Seen on HD.  7/16: Transferred out of the ICU, tolerated HD-net neg 1.5L, patient decided against hospice for now  7/17: Seen on HD. Low Bps but stable. DISPO pending     REVIEW OF SYSTEMS:    Deferred    PMH/PSH/SH/FH: Reviewed and unchanged since admission note  CURRENT MEDICATIONS: Reviewed from admission to present day    VS:  VS:  /52   Pulse (!) 105   Temp 36.5 °C (97.7 °F) (Temporal)   Resp 18   Ht 1.702 m (5' 7\")   Wt 73.8 kg (162 lb 11.2 oz)   SpO2 91%   BMI 25.48 kg/m²   GENERAL: ill appearing  CV: +edema  RESP: Non-labored  GI: Soft  MSK: BL BKA   SKIN: +ecchymosis, necrotic L. Digits   NEURO: AOx3  PSYCH: Cooperative    Fluids:  No intake/output data recorded.    LABS:  Recent Labs     07/15/21  0415 07/16/21  0400 07/17/21  0339   SODIUM 135 136 134*   POTASSIUM 6.0* 5.4 5.4   CHLORIDE 99 99 96   CO2 26 30 28   GLUCOSE 86 80 97 "   BUN 27* 21 28*   CREATININE 5.16* 3.85* 5.07*   CALCIUM 8.9 8.9 8.9     Available labs, imaging and clinical documentation reviewed.     IMPRESSION:  # ESRD, dependent on HD qTTS    # Hypotension, acute on chronic  - On 15 mg midodrine TID    # Anemia of CKD, below target   - ARMANDO with HD  - Ferritin elevated    # PVD, severe  - On Plavix     # Acute on chronic respiratory failure, improved   - s/p R thora 7/8 with 2 L removed  - On supp O2 via NC    # HFrEF  - EF 20%%     # CKD-MBD  - phos and Ca wnl  - On calcitriol and sevelamer     # Hyperthyroidism, on methimazole    # HLD, on statin therapy    # BPH, on tamsulosin    # CAD: History of 80% ostial LAD in-stent restenosis    # Dry Gangrene of L. Hand   - s/p ligation LUE AVF 6/23     Plan:  -HD today, Continue iHD qTTS/PRN  -ongoing GOC discussions, Pt/family declining hospice for now. Ok for discharge from renal standpoint when medically cleared. Unclear if he can tolerate outpt HD but time will tell  -Dose all meds for ESRD  -Continue calcitriol, and sevelamer with meals  -ARMANDO with HD  -Transfuse PRN Hgb <7   -Strict I/O    Pt is at high risk for further morbidity/mortality with poor overall prognosis     Thank you

## 2021-07-17 NOTE — PROGRESS NOTES
Hospital Medicine Daily Progress Note    Date of Service  7/17/2021    Chief Complaint  Shortness of breath and cough    Hospital Course  Luis Sepulveda is a 75 y.o. male w/ hx of CAD w/ stent, HFrEF:30%, DM,ESRD on HD, HTN, Afib, PVD w/ bilateral BKA admitted 7/7/2021 with acute respiratory failure despite home 4L oxygen and was found to have a right sided pleural effusion and volume overload.  He had a thoracentesis on 7/8/21 w/ 2050ml of fluids removed. After his thoracentesis he became hypotensive and required pressor support in the ICU.  He was weaned off norepinephrine on 7/13am.    Interval Problem Update  7/17:   No overnight events    Dialysis today    casemanager working on snf     I have personally seen and examined the patient at bedside. I discussed the plan of care with patient  Consultants/Specialty  renal    Code Status  DNAR/DNI    Disposition  Patient is medically cleared.   Anticipate discharge to to skilled nursing facility.  I have placed the appropriate orders for post-discharge needs.    Review of Systems  Review of Systems   Constitutional: Negative for fever and malaise/fatigue.   HENT: Negative for congestion.    Eyes: Negative for blurred vision.   Respiratory: Negative for cough and shortness of breath.    Cardiovascular: Negative for chest pain and palpitations.   Gastrointestinal: Negative for abdominal pain and nausea.   Musculoskeletal: Negative for joint pain.   Neurological: Negative for speech change and headaches.   Psychiatric/Behavioral: The patient is not nervous/anxious.         Physical Exam  Temp:  [36.2 °C (97.1 °F)-36.9 °C (98.5 °F)] 36.2 °C (97.1 °F)  Pulse:  [] 89  Resp:  [18] 18  BP: (102-128)/(48-98) 123/66  SpO2:  [91 %-100 %] 100 %    Physical Exam  Vitals reviewed.   Constitutional:       Appearance: Normal appearance. He is not diaphoretic.   HENT:      Head: Normocephalic and atraumatic.      Nose: Nose normal.      Mouth/Throat:      Mouth: Mucous  membranes are moist.      Pharynx: No oropharyngeal exudate.   Eyes:      General: No scleral icterus.        Right eye: No discharge.         Left eye: No discharge.      Extraocular Movements: Extraocular movements intact.      Conjunctiva/sclera: Conjunctivae normal.   Cardiovascular:      Rate and Rhythm: Normal rate and regular rhythm.      Pulses:           Radial pulses are 2+ on the right side and 2+ on the left side.      Heart sounds: No murmur heard.     Pulmonary:      Effort: Pulmonary effort is normal. No respiratory distress.      Breath sounds: Normal breath sounds. No wheezing or rales.   Abdominal:      General: Bowel sounds are normal. There is no distension.      Palpations: Abdomen is soft.   Musculoskeletal:         General: No swelling or tenderness.      Cervical back: No tenderness. No muscular tenderness.      Comments: Bilateral BKA.    gangrenous changes left 2nd, 3rd and 4th fingers   Skin:     Coloration: Skin is not jaundiced or pale.   Neurological:      General: No focal deficit present.      Mental Status: He is alert and oriented to person, place, and time. Mental status is at baseline.      Cranial Nerves: No cranial nerve deficit.   Psychiatric:         Mood and Affect: Mood normal.         Behavior: Behavior normal.         Fluids    Intake/Output Summary (Last 24 hours) at 7/17/2021 1126  Last data filed at 7/17/2021 1030  Gross per 24 hour   Intake 500 ml   Output 2000 ml   Net -1500 ml       Laboratory  Recent Labs     07/15/21  0415 07/16/21  0400   WBC 5.6 5.5   RBC 2.53* 2.53*   HEMOGLOBIN 8.0* 7.8*   HEMATOCRIT 26.2* 25.8*   .6* 102.0*   MCH 31.6 30.8   MCHC 30.5* 30.2*   RDW 54.9* 53.1*   PLATELETCT 247 258   MPV 9.0 9.1     Recent Labs     07/15/21  0415 07/16/21  0400 07/17/21  0339   SODIUM 135 136 134*   POTASSIUM 6.0* 5.4 5.4   CHLORIDE 99 99 96   CO2 26 30 28   GLUCOSE 86 80 97   BUN 27* 21 28*   CREATININE 5.16* 3.85* 5.07*   CALCIUM 8.9 8.9 8.9                    Imaging  MR-HAND - W/O LEFT   Final Result      1.  No evidence of osteomyelitis.      2.  Soft tissue loss dorsally and distally involving the second, third and fourth digits.      DX-CHEST-PORTABLE (1 VIEW)   Final Result      1. Improving moderate right pleural effusion.   2. Allowing for differences in technique, the remainder is stable.      DX-HAND 3+ LEFT   Final Result      1.  Multiple sites of osteoarthritis      2.  Small vessel atherosclerotic plaque      3.  No radiographic evidence for osteomyelitis      DX-CHEST-PORTABLE (1 VIEW)   Final Result      1.  Left internal jugular catheter appears appropriately located      2.  Pulmonary edema      3.  Right pleural effusion, probably moderate in size      4.  Bilateral atelectasis      DX-CHEST-PORTABLE (1 VIEW)   Final Result      1.  Interval increased aeration and improvement of RIGHT pleural effusion.   2.  No pneumothorax.         US-THORACENTESIS PUNCTURE RIGHT   Final Result   Addendum 1 of 1   Addendum issued to correct volume of fluid withdrawn.  Actual volume of    pleural fluid withdrawn was 2050 mL.      Final      1. Ultrasound guided right sided diagnostic/therapeutic thoracentesis.      2. 2450 mL of fluid withdrawn.      DX-CHEST-PORTABLE (1 VIEW)   Final Result      1.  Moderate to large right pleural effusion with overlying atelectasis/consolidation.   2.  Interstitial prominence likely represents interstitial edema.   3.  Stable cardiomegaly.           Assessment/Plan  * Septic shock (HCC)- (present on admission)  Assessment & Plan  This is Sepsis Present on admission  SIRS criteria identified on my evaluation include: Tachycardia, with heart rate greater than 90 BPM  Source is lung  Sepsis protocol initiated  Fluid resuscitation ordered per protocol  IV antibiotics as appropriate for source of sepsis- completed  While organ dysfunction may be noted elsewhere in this problem list or in the chart, degree of organ dysfunction does  not meet CMS criteria for severe sepsis    Cont midodrine          Acute on chronic respiratory failure (HCC)- (present on admission)  Assessment & Plan  Likely sec to R pleural effusion and fluid overload  S/p Thoracentesis w/ 2L removed  Nephrology consulting for HD  Duoneb, RT protocol  Typically on 4L O2 baseline  Cont monitoring respiratory status    BPH (benign prostatic hyperplasia)- (present on admission)  Assessment & Plan  tamsulosin for ongoing active treatment.  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    Status post below-knee amputation of both lower extremities (HCC)- (present on admission)  Assessment & Plan  S/p BKA b/l. Wound care to left leg.  Right stump unremarkalble    DNR (do not resuscitate)- (present on admission)  Assessment & Plan  Per POLST: patient is DNR/DNI    Acute on chronic systolic congestive heart failure (HCC)- (present on admission)  Assessment & Plan  LVEF 30%  Holding Lisinopril, carvedilol, lasix  2 g salt  Midodrine for hypotension  Adjust meds as needed    Goals of care, counseling/discussion- (present on admission)  Assessment & Plan  Referred to snf    Hospice eval    Peripheral vascular disease (HCC)- (present on admission)  Assessment & Plan  Hx of PAD with escar noted on L thumb, index and middle finger  7/14 MRI Hand without sign of osteomyelitis    Paroxysmal atrial fibrillation (HCC)- (present on admission)  Assessment & Plan  Rate control and monitor on telemetry  Holding coreg due to hypotension and currently on midodrine    Cont eliquis    End stage renal disease on dialysis ([facility])- (present on admission)  Assessment & Plan  Cont dialysis  Monitor labs/vitals.    Type 2 diabetes mellitus with  kidney complication, with long-term current use of insulin (HCC)- (present on admission)  Assessment & Plan  On SSI and hypoglycemic protocol  A1c 5.7 2 months ago  Diabetic diet    Anemia- (present on admission)  Assessment & Plan  Of chronic kidney disease  EPO per nephrology  Monitor cbc    CAD (coronary artery disease)- (present on admission)  Assessment & Plan  History of  Continue outpatient meds: plavix, statin  Holding lisinopril and coreg due to hypotension       VTE prophylaxis: heparin ppx and therapeutic anticoagulation with eliquis    I have performed a physical exam and reviewed and updated ROS and Plan today (7/17/2021). In review of yesterday's note (7/16/2021), there are no changes except as documented above.

## 2021-07-17 NOTE — CARE PLAN
The patient is Stable - Low risk of patient condition declining or worsening    Shift Goals  Clinical Goals: stable hemodynamics  Patient Goals: Rest; comfort  Family Goals: Safety    Progress made toward(s) clinical / shift goals:    Problem: Skin Integrity  Goal: Skin integrity is maintained or improved  Outcome: Progressing     Problem: Pain - Standard  Goal: Alleviation of pain or a reduction in pain to the patient’s comfort goal  Outcome: Progressing     Problem: Fall Risk  Goal: Patient will remain free from falls  Outcome: Progressing       Patient is not progressing towards the following goals:

## 2021-07-17 NOTE — PROGRESS NOTES
Received report from day shift RNMarlee. Assumed care of pt. Pt reports no needs at this time. Updated pt on plan of care. Pt resting comfortably in bed. Fall and skin precautions in place. Educated on use of call light. Hourly rounding and continuous monitoring in place.

## 2021-07-18 NOTE — PROGRESS NOTES
4 Eyes Skin Assessment Completed by JAN Tomlinson and JAN Wallace.    Head WDL  Ears WDL  Nose WDL  Mouth WDL  Neck WDL  Breast/Chest WDL  Shoulder Blades WDL  Spine WDL  (R) Arm/Elbow/Hand Redness  (L) Arm/Elbow/Hand Redness, Bruising and Discoloration  Abdomen WDL  Groin Redness  Scrotum/Coccyx/Buttocks Redness and Excoriation  (R) Leg WDL  (L) Leg Redness and Scab            Devices In Places Tele Box and Nasal Cannula      Interventions In Place Gray Ear Foams, Waffle Overlay, Q2 Turns and Pressure Redistribution Mattress    Possible Skin Injury No    Pictures Uploaded Into Epic N/A  Wound Consult Placed N/A  RN Wound Prevention Protocol Ordered No

## 2021-07-18 NOTE — PROGRESS NOTES
Received report from day shift RNTamiko. Assumed care of pt. Pt reports no needs at this time. Updated pt on plan of care. Pt resting comfortably in bed. Fall and skin precautions in place. Educated on use of call light. Hourly rounding and continuous monitoring in place.

## 2021-07-18 NOTE — CARE PLAN
Problem: Skin Integrity  Goal: Skin integrity is maintained or improved  7/18/2021 1224 by Ashlee Gaona RTreyNTrey  Outcome: Progressing  Note: 4 eye skin check, X6mphbb, waffle mattress in place, silicone NC with gray foams.   7/18/2021 1223 by Ashlee Gaona R.N.  Outcome: Progressing     Problem: Fall Risk  Goal: Patient will remain free from falls  7/18/2021 1224 by KENNY GongNTrey  Outcome: Progressing  Note: Fall precautions in place. Bed in lowest position. Non-skid socks in place. Personal possessions within reach. Mobility sign on door. Bed-alarm on. Call light within reach. Pt educated regarding fall prevention and states understanding.     7/18/2021 1223 by Ashlee Gaona RJONO  Outcome: Progressing   The patient is Stable - Low risk of patient condition declining or worsening    Shift Goals  Clinical Goals: stable hemodynamics  Patient Goals: Rest  Family Goals: safety    Progress made toward(s) clinical / shift goals:  progressing    Patient is not progressing towards the following goals:

## 2021-07-18 NOTE — PROGRESS NOTES
Hospital Medicine Daily Progress Note    Date of Service  7/18/2021    Chief Complaint  Shortness of breath and cough    Hospital Course  Luis Sepulveda is a 75 y.o. male w/ hx of CAD w/ stent, HFrEF:30%, DM,ESRD on HD, HTN, Afib, PVD w/ bilateral BKA admitted 7/7/2021 with acute respiratory failure despite home 4L oxygen and was found to have a right sided pleural effusion and volume overload.  He had a thoracentesis on 7/8/21 w/ 2050ml of fluids removed. After his thoracentesis he became hypotensive and required pressor support in the ICU.  He was weaned off norepinephrine on 7/13am.    Interval Problem Update  7/18:   In bed    No new complaints    casemanager working on snf     I have personally seen and examined the patient at bedside. I discussed the plan of care with patient  Consultants/Specialty  renal    Code Status  DNAR/DNI    Disposition  Patient is medically cleared.   Anticipate discharge to to skilled nursing facility.  I have placed the appropriate orders for post-discharge needs.    Review of Systems  Review of Systems   Constitutional: Negative for fever and malaise/fatigue.   HENT: Negative for congestion.    Eyes: Negative for blurred vision.   Respiratory: Negative for cough and shortness of breath.    Cardiovascular: Negative for chest pain and palpitations.   Gastrointestinal: Negative for abdominal pain and nausea.   Musculoskeletal: Negative for joint pain.   Neurological: Negative for speech change and headaches.   Psychiatric/Behavioral: The patient is not nervous/anxious.         Physical Exam  Temp:  [36.2 °C (97.2 °F)-37.1 °C (98.7 °F)] 36.4 °C (97.6 °F)  Pulse:  [] 102  Resp:  [17-18] 18  BP: (100-135)/(46-91) 100/46  SpO2:  [93 %-99 %] 95 %    Physical Exam  Vitals reviewed.   Constitutional:       Appearance: Normal appearance. He is not diaphoretic.   HENT:      Head: Normocephalic and atraumatic.      Nose: Nose normal.      Mouth/Throat:      Mouth: Mucous membranes  are moist.      Pharynx: No oropharyngeal exudate.   Eyes:      General: No scleral icterus.        Right eye: No discharge.         Left eye: No discharge.      Extraocular Movements: Extraocular movements intact.      Conjunctiva/sclera: Conjunctivae normal.   Cardiovascular:      Rate and Rhythm: Normal rate and regular rhythm.      Pulses:           Radial pulses are 2+ on the right side and 2+ on the left side.      Heart sounds: No murmur heard.     Pulmonary:      Effort: Pulmonary effort is normal. No respiratory distress.      Breath sounds: Normal breath sounds. No wheezing or rales.   Abdominal:      General: Bowel sounds are normal. There is no distension.      Palpations: Abdomen is soft.   Musculoskeletal:         General: No swelling or tenderness.      Cervical back: No tenderness. No muscular tenderness.      Comments: Bilateral BKA.    gangrenous changes left 2nd, 3rd and 4th fingers   Skin:     Coloration: Skin is not jaundiced or pale.   Neurological:      General: No focal deficit present.      Mental Status: He is alert and oriented to person, place, and time. Mental status is at baseline.      Cranial Nerves: No cranial nerve deficit.   Psychiatric:         Mood and Affect: Mood normal.         Behavior: Behavior normal.         Fluids    Intake/Output Summary (Last 24 hours) at 7/18/2021 1130  Last data filed at 7/18/2021 0800  Gross per 24 hour   Intake 100 ml   Output 400 ml   Net -300 ml       Laboratory  Recent Labs     07/16/21  0400 07/18/21  0308   WBC 5.5 6.4   RBC 2.53* 2.63*   HEMOGLOBIN 7.8* 8.3*   HEMATOCRIT 25.8* 27.0*   .0* 102.7*   MCH 30.8 31.6   MCHC 30.2* 30.7*   RDW 53.1* 53.6*   PLATELETCT 258 295   MPV 9.1 8.9*     Recent Labs     07/16/21  0400 07/17/21  0339 07/18/21  0308   SODIUM 136 134* 136   POTASSIUM 5.4 5.4 5.0   CHLORIDE 99 96 98   CO2 30 28 29   GLUCOSE 80 97 107*   BUN 21 28* 19   CREATININE 3.85* 5.07* 3.93*   CALCIUM 8.9 8.9 8.9                    Imaging  MR-HAND - W/O LEFT   Final Result      1.  No evidence of osteomyelitis.      2.  Soft tissue loss dorsally and distally involving the second, third and fourth digits.      DX-CHEST-PORTABLE (1 VIEW)   Final Result      1. Improving moderate right pleural effusion.   2. Allowing for differences in technique, the remainder is stable.      DX-HAND 3+ LEFT   Final Result      1.  Multiple sites of osteoarthritis      2.  Small vessel atherosclerotic plaque      3.  No radiographic evidence for osteomyelitis      DX-CHEST-PORTABLE (1 VIEW)   Final Result      1.  Left internal jugular catheter appears appropriately located      2.  Pulmonary edema      3.  Right pleural effusion, probably moderate in size      4.  Bilateral atelectasis      DX-CHEST-PORTABLE (1 VIEW)   Final Result      1.  Interval increased aeration and improvement of RIGHT pleural effusion.   2.  No pneumothorax.         US-THORACENTESIS PUNCTURE RIGHT   Final Result   Addendum 1 of 1   Addendum issued to correct volume of fluid withdrawn.  Actual volume of    pleural fluid withdrawn was 2050 mL.      Final      1. Ultrasound guided right sided diagnostic/therapeutic thoracentesis.      2. 2450 mL of fluid withdrawn.      DX-CHEST-PORTABLE (1 VIEW)   Final Result      1.  Moderate to large right pleural effusion with overlying atelectasis/consolidation.   2.  Interstitial prominence likely represents interstitial edema.   3.  Stable cardiomegaly.           Assessment/Plan  * Septic shock (HCC)- (present on admission)  Assessment & Plan  This is Sepsis Present on admission  SIRS criteria identified on my evaluation include: Tachycardia, with heart rate greater than 90 BPM  Source is lung  Sepsis protocol initiated  Fluid resuscitation ordered per protocol  IV antibiotics as appropriate for source of sepsis- completed  While organ dysfunction may be noted elsewhere in this problem list or in the chart, degree of organ dysfunction does not  meet CMS criteria for severe sepsis    Cont midodrine          Acute on chronic respiratory failure (HCC)- (present on admission)  Assessment & Plan  Likely sec to R pleural effusion and fluid overload  S/p Thoracentesis w/ 2L removed  Nephrology consulting for HD  Duoneb, RT protocol  Typically on 4L O2 baseline  Cont monitoring respiratory status    BPH (benign prostatic hyperplasia)- (present on admission)  Assessment & Plan  tamsulosin for ongoing active treatment.  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    Status post below-knee amputation of both lower extremities ([facility])- (present on admission)  Assessment & Plan  S/p BKA b/l. Wound care to left leg.  Right stump unremarkalble    DNR (do not resuscitate)- (present on admission)  Assessment & Plan  Per POLST: patient is DNR/DNI    Acute on chronic systolic congestive heart failure (HCC)- (present on admission)  Assessment & Plan  LVEF 30%  Holding Lisinopril, carvedilol, lasix  2 g salt  Midodrine for hypotension  Adjust meds as needed    Goals of care, counseling/discussion- (present on admission)  Assessment & Plan  Referred to snf    Hospice eval    Peripheral vascular disease (HCC)- (present on admission)  Assessment & Plan  Hx of PAD with escar noted on L thumb, index and middle finger  7/14 MRI Hand without sign of osteomyelitis    Paroxysmal atrial fibrillation (HCC)- (present on admission)  Assessment & Plan  Rate control and monitor on telemetry  Holding coreg due to hypotension and currently on midodrine    Cont eliquis    End stage renal disease on dialysis ([facility])- (present on admission)  Assessment & Plan  Cont dialysis  Monitor labs/vitals.    Type 2 diabetes mellitus with kidney  complication, with long-term current use of insulin (HCC)- (present on admission)  Assessment & Plan  On SSI and hypoglycemic protocol  A1c 5.7 2 months ago  Diabetic diet    Anemia- (present on admission)  Assessment & Plan  Of chronic kidney disease  EPO per nephrology  Monitor cbc    CAD (coronary artery disease)- (present on admission)  Assessment & Plan  History of  Continue outpatient meds: plavix, statin  Holding lisinopril and coreg due to hypotension       VTE prophylaxis: heparin ppx and therapeutic anticoagulation with eliquis    I have performed a physical exam and reviewed and updated ROS and Plan today (7/18/2021). In review of yesterday's note (7/17/2021), there are no changes except as documented above.

## 2021-07-19 NOTE — CARE PLAN
The patient is Stable - Low risk of patient condition declining or worsening    Shift Goals  Clinical Goals: stable hemodynamics  Patient Goals: Rest  Family Goals: safety    Problem: Care Map:  Admission Optimal Outcome for the Heart Failure Patient  Goal: Admission:  Optimal Care of the heart failure patient  Outcome: Progressing     Problem: Care Map:  Day 1 Optimal Outcome for the Heart Failure Patient  Goal: Day 1:  Optimal Care of the heart failure patient  Outcome: Progressing     Problem: Care Map:  Day 2 Optimal Outcome for the Heart Failure Patient  Goal: Day 2:  Optimal Care of the heart failure patient  Outcome: Progressing     Problem: Care Map:  Day 3 Optimal Outcome for the Heart Failure Patient  Goal: Day 3:  Optimal Care of the heart failure patient  Outcome: Progressing     Problem: Care Map:  Day Before Discharge Optimal Outcome for the Heart Failure Patient  Goal: Day Before Discharge:  Optimal Care of the heart failure patient  Outcome: Progressing     Problem: Care Map:  Day of Discharge Optimal Outcome for the Heart Failure Patient  Goal: Day of Discharge:  Optimal Care of the heart failure patient  Outcome: Progressing     Problem: Skin Integrity  Goal: Skin integrity is maintained or improved  Outcome: Progressing     Problem: Pain - Standard  Goal: Alleviation of pain or a reduction in pain to the patient’s comfort goal  Outcome: Progressing     Problem: Fall Risk  Goal: Patient will remain free from falls  Outcome: Progressing

## 2021-07-19 NOTE — TELEPHONE ENCOUNTER
RO    Pts wife Indiana called to check on the DBQ paperwork for [facility] that [place] believes she dropped off in a manila envelope at Pts appt on 7/2. Please call Indiana back at [phone].    Thank you

## 2021-07-19 NOTE — PROGRESS NOTES
Hospital Medicine Daily Progress Note    Date of Service  7/19/2021    Chief Complaint  Shortness of breath and cough    Hospital Course  Luis Sepulveda is a 75 y.o. male w/ hx of CAD w/ stent, HFrEF:30%, DM,ESRD on HD, HTN, Afib, PVD w/ bilateral BKA admitted 7/7/2021 with acute respiratory failure despite home 4L oxygen and was found to have a right sided pleural effusion and volume overload.  He had a thoracentesis on 7/8/21 w/ 2050ml of fluids removed. After his thoracentesis he became hypotensive and required pressor support in the ICU.  He was weaned off norepinephrine on 7/13am.  Patient was transferred to the telemetry floor with p.o. midodrine.. Patient continue with routine dialysis as per renal MD. patient was referred to a skilled facility for rehabilitation    Interval Problem Update  7/19:   As per floor RN patient hadbrief  transient change in mental status that resolved as the patient was placed in the Trendelenburg position.  Patient's  vitals were within normal limits at that time however this quick resolution  Does suggest intravascular volume depletion as a likely cause.  The floor RN gave the patient midodrine as scheduled    casemanager working on snf     I have personally seen and examined the patient at bedside. I discussed the plan of care with patient  Consultants/Specialty  renal    Code Status  DNAR/DNI    Disposition  Patient is medically cleared.   Anticipate discharge to to skilled nursing facility.  I have placed the appropriate orders for post-discharge needs.    Review of Systems  Review of Systems   Constitutional: Negative for fever and malaise/fatigue.   HENT: Negative for congestion.    Eyes: Negative for blurred vision.   Respiratory: Negative for cough and shortness of breath.    Cardiovascular: Negative for chest pain and palpitations.   Gastrointestinal: Negative for abdominal pain and nausea.   Musculoskeletal: Negative for joint pain.   Neurological: Negative for  speech change and headaches.   Psychiatric/Behavioral: The patient is not nervous/anxious.         Physical Exam  Temp:  [35.9 °C (96.6 °F)-37.1 °C (98.8 °F)] 36.5 °C (97.7 °F)  Pulse:  [] 74  Resp:  [16-18] 17  BP: ()/(43-74) 116/74  SpO2:  [95 %-100 %] 100 %    Physical Exam  Vitals reviewed.   Constitutional:       Appearance: Normal appearance. He is not diaphoretic.   HENT:      Head: Normocephalic and atraumatic.      Nose: Nose normal.      Mouth/Throat:      Mouth: Mucous membranes are moist.      Pharynx: No oropharyngeal exudate.   Eyes:      General: No scleral icterus.        Right eye: No discharge.         Left eye: No discharge.      Extraocular Movements: Extraocular movements intact.      Conjunctiva/sclera: Conjunctivae normal.   Cardiovascular:      Rate and Rhythm: Normal rate and regular rhythm.      Pulses:           Radial pulses are 2+ on the right side and 2+ on the left side.      Heart sounds: No murmur heard.     Pulmonary:      Effort: Pulmonary effort is normal. No respiratory distress.      Breath sounds: Normal breath sounds. No wheezing or rales.   Abdominal:      General: Bowel sounds are normal. There is no distension.      Palpations: Abdomen is soft.   Musculoskeletal:         General: No swelling or tenderness.      Cervical back: No tenderness. No muscular tenderness.      Comments: Bilateral BKA.    gangrenous changes left 2nd, 3rd and 4th fingers   Skin:     Coloration: Skin is not jaundiced or pale.   Neurological:      General: No focal deficit present.      Mental Status: He is alert and oriented to person, place, and time. Mental status is at baseline.      Cranial Nerves: No cranial nerve deficit.   Psychiatric:         Mood and Affect: Mood normal.         Behavior: Behavior normal.         Fluids    Intake/Output Summary (Last 24 hours) at 7/19/2021 1248  Last data filed at 7/19/2021 1047  Gross per 24 hour   Intake 240 ml   Output --   Net 240 ml        Laboratory  Recent Labs     07/18/21  0308 07/19/21  0202   WBC 6.4 6.6   RBC 2.63* 2.76*   HEMOGLOBIN 8.3* 8.6*   HEMATOCRIT 27.0* 28.3*   .7* 102.5*   MCH 31.6 31.2   MCHC 30.7* 30.4*   RDW 53.6* 53.5*   PLATELETCT 295 336   MPV 8.9* 9.0     Recent Labs     07/17/21  0339 07/18/21  0308 07/19/21  0202   SODIUM 134* 136 134*   POTASSIUM 5.4 5.0 5.2   CHLORIDE 96 98 97   CO2 28 29 28   GLUCOSE 97 107* 94   BUN 28* 19 27*   CREATININE 5.07* 3.93* 5.12*   CALCIUM 8.9 8.9 9.3                   Imaging  MR-HAND - W/O LEFT   Final Result      1.  No evidence of osteomyelitis.      2.  Soft tissue loss dorsally and distally involving the second, third and fourth digits.      DX-CHEST-PORTABLE (1 VIEW)   Final Result      1. Improving moderate right pleural effusion.   2. Allowing for differences in technique, the remainder is stable.      DX-HAND 3+ LEFT   Final Result      1.  Multiple sites of osteoarthritis      2.  Small vessel atherosclerotic plaque      3.  No radiographic evidence for osteomyelitis      DX-CHEST-PORTABLE (1 VIEW)   Final Result      1.  Left internal jugular catheter appears appropriately located      2.  Pulmonary edema      3.  Right pleural effusion, probably moderate in size      4.  Bilateral atelectasis      DX-CHEST-PORTABLE (1 VIEW)   Final Result      1.  Interval increased aeration and improvement of RIGHT pleural effusion.   2.  No pneumothorax.         US-THORACENTESIS PUNCTURE RIGHT   Final Result   Addendum 1 of 1   Addendum issued to correct volume of fluid withdrawn.  Actual volume of    pleural fluid withdrawn was 2050 mL.      Final      1. Ultrasound guided right sided diagnostic/therapeutic thoracentesis.      2. 2450 mL of fluid withdrawn.      DX-CHEST-PORTABLE (1 VIEW)   Final Result      1.  Moderate to large right pleural effusion with overlying atelectasis/consolidation.   2.  Interstitial prominence likely represents interstitial edema.   3.  Stable  cardiomegaly.           Assessment/Plan  * Septic shock (HCC)- (present on admission)  Assessment & Plan  This is Sepsis Present on admission  SIRS criteria identified on my evaluation include: Tachycardia, with heart rate greater than 90 BPM  Source is lung  Sepsis protocol initiated  Fluid resuscitation ordered per protocol  IV antibiotics as appropriate for source of sepsis- completed  While organ dysfunction may be noted elsewhere in this problem list or in the chart, degree of organ dysfunction does not meet CMS criteria for severe sepsis    Cont midodrine          Acute on chronic respiratory failure (HCC)- (present on admission)  Assessment & Plan  Likely sec to R pleural effusion and fluid overload  S/p Thoracentesis w/ 2L removed  Nephrology consulting for HD  Duoneb, RT protocol  Typically on 4L O2 baseline  Cont monitoring respiratory status    BPH (benign prostatic hyperplasia)- (present on admission)  Assessment & Plan  tamsulosin for ongoing active treatment.  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    Status post below-knee amputation of both lower extremities (HCC)- (present on admission)  Assessment & Plan  S/p BKA b/l. Wound care to left leg.  Right stump unremarkalble    DNR (do not resuscitate)- (present on admission)  Assessment & Plan  Per POLST: patient is DNR/DNI    Acute on chronic systolic congestive heart failure (HCC)- (present on admission)  Assessment & Plan  LVEF 30%  Holding Lisinopril, carvedilol, lasix  2 g salt  Midodrine for hypotension  Adjust meds as needed    Goals of care, counseling/discussion- (present on admission)  Assessment & Plan  Referred to snf    Hospice eval    Peripheral vascular disease (HCC)-  (present on admission)  Assessment & Plan  Hx of PAD with escar noted on L thumb, index and middle finger  7/14 MRI Hand without sign of osteomyelitis    Paroxysmal atrial fibrillation (HCC)- (present on admission)  Assessment & Plan  Rate control and monitor on telemetry  Holding coreg due to hypotension and currently on midodrine    Cont eliquis    End stage renal disease on dialysis (HCC)- (present on admission)  Assessment & Plan  Cont dialysis  Monitor labs/vitals.    Type 2 diabetes mellitus with kidney complication, with long-term current use of insulin (HCC)- (present on admission)  Assessment & Plan  On SSI and hypoglycemic protocol  A1c 5.7 2 months ago  Diabetic diet    Anemia- (present on admission)  Assessment & Plan  Of chronic kidney disease  EPO per nephrology  Monitor cbc    CAD (coronary artery disease)- (present on admission)  Assessment & Plan  History of  Continue outpatient meds: plavix, statin  Holding lisinopril and coreg due to hypotension       VTE prophylaxis: heparin ppx and therapeutic anticoagulation with eliquis    I have performed a physical exam and reviewed and updated ROS and Plan today (7/19/2021). In review of yesterday's note (7/18/2021), there are no changes except as documented above.

## 2021-07-19 NOTE — DISCHARGE PLANNING
Anticipated Discharge Disposition: SNF    Action: pt pending SNF acceptance, pt needs SNF that will accept pts dialysis.    Barriers to Discharge: SNF acceptance    Plan: Continue to find a SNF that will accept pt on dialysis.

## 2021-07-19 NOTE — PROGRESS NOTES
PUF  treatment ordered by Dr Hodges started at 1204 and ended at 1404 with net UF of 2 liters as bp tolerated. Confused while on HD. Diastolic was on the 50's prior to HD and dropped lower while on HD, asymptomatic. Dr Hodges was notified and no orders or changes made. Post treatment , gave report to rishi Morrison RN. See flow sheet for details.

## 2021-07-19 NOTE — PROGRESS NOTES
4 Eyes Skin Assessment Completed by JAN Morrison and JAN Shah.    Head WDL  Ears WDL  Nose WDL  Mouth WDL  Neck WDL  Breast/Chest WDL  Shoulder Blades WDL  Spine WDL  (R) Arm/Elbow/Hand Redness, Blanching and Bruising  (L) Arm/Elbow/Hand Redness, Blanching and Bruising  Abdomen WDL  Groin Redness  Scrotum/Coccyx/Buttocks Redness and Blanching  (R) Leg Redness and Blanching, scar, BKA  (L) Leg Redness, Blanching, Scar and Scab, BKA          Devices In Places Tele Box, Central Line and Nasal Cannula      Interventions In Place NC W/Ear Foams, Waffle Overlay, Pillows, Q2 Turns and Pressure Redistribution Mattress    Possible Skin Injury No    Pictures Uploaded Into Epic N/A  Wound Consult Placed N/A  RN Wound Prevention Protocol Ordered No

## 2021-07-19 NOTE — PROGRESS NOTES
4 Eyes Skin Assessment Completed by Ashlee RN and Maksim RN.    Head WDL  Ears WDL  Nose WDL  Mouth WDL  Neck WDL  Breast/Chest WDL  Shoulder Blades WDL  Spine WDL  (R) Arm/Elbow/Hand Redness and Blanching, Bruising  (L) Arm/Elbow/Hand Redness, Blanching and Bruising  Abdomen Redness  Groin Redness  Scrotum/Coccyx/Buttocks Redness and Excoriation  (R) Leg Redness, Blanching and Scar, BKA  (L) Leg Redness, Blanching and Scab, BKA            Devices In Places Tele Box, Nasal Cannula      Interventions In Place Gray Ear Foams, Waffle Overlay, Pillows, Q2 Turns, Heels Loaded W/Pillows and Pressure Redistribution Mattress    Possible Skin Injury No    Pictures Uploaded Into Epic N/A  Wound Consult Placed N/A  RN Wound Prevention Protocol Ordered No

## 2021-07-19 NOTE — CARE PLAN
Problem: Skin Integrity  Goal: Skin integrity is maintained or improved  Outcome: Progressing  Note: Waffle cushion in place. Q2 turns with Qshift skin checks in place. Pictures of wounds taken.      Problem: Pain - Standard  Goal: Alleviation of pain or a reduction in pain to the patient’s comfort goal  Outcome: Progressing  Note: No complaints of pain. Some aching in left fingers.

## 2021-07-19 NOTE — PROGRESS NOTES
"[facility] -  PROGRESS NOTE               Author: Kulwant Hodges M.D. Date & Time: 7/19/2021  10:39 AM     HPI:  74 y/o man with ESRD HD T,TH,SAt presented with SOB. Pt has reccurrent pleural effusion. Pt /sp thoracentisis in the past.  Pt with recent ligation of left arm AVF  Pt makes a small amount of urine    DAILY NEPHROLOGY SUMMARY:  7/9: HD yesterday 1 liter UF, but transferred to ICU for low BP. He is on pressors now  7/10: Pt sitting up in bed, comfortable, L IJ CVC placed yesterday, on 15 mcg levo gtt, completed iHD today, resp status stable  7/11: remains on pressors, tolerated HD, no new c/o   7/12:  Remains on pressors, long discussion regarding GOC, overall malaise   7/13: No acute events, resting comfortably, dialysis planned ~11am  7/14: No acute events, Norepi weaned off. Ongoing GOC discussions with pt/family, sounds like hoping to optimize comfort going forward   7/15: Ongoing GOC discussions. Palliative re-involved yesterday. Long talk with wife and Adalid. Goal remains to optimize at home. Ongoni hand pain. No CP or SOB. Seen on HD.  7/16: Transferred out of the ICU, tolerated HD-net neg 1.5L, patient decided against hospice for now  7/17: Seen on HD. Low Bps but stable. DISPO pending   7/19: Productive cough, unable to clear secretions    REVIEW OF SYSTEMS:    10 point ROS negative except stated    PMH/PSH/SH/FH: Reviewed and unchanged since admission note  CURRENT MEDICATIONS: Reviewed from admission to present day    VS:  VS:  /74   Pulse 74   Temp 36.5 °C (97.7 °F) (Temporal)   Resp 17   Ht 1.702 m (5' 7\")   Wt 73.8 kg (162 lb 11.2 oz)   SpO2 100%   BMI 25.48 kg/m²   GENERAL: ill appearing  CV: +edema  RESP: Non-labored  GI: Soft  MSK: BL BKA   SKIN: +ecchymosis, necrotic L. Digits   NEURO: AOx3  PSYCH: Cooperative    Fluids:  In: 100 [P.O.:100]  Out: -     LABS:  Recent Labs     07/17/21  0339 07/18/21  0308 07/19/21  0202   SODIUM 134* 136 134* "   POTASSIUM 5.4 5.0 5.2   CHLORIDE 96 98 97   CO2 28 29 28   GLUCOSE 97 107* 94   BUN 28* 19 27*   CREATININE 5.07* 3.93* 5.12*   CALCIUM 8.9 8.9 9.3     Available labs, imaging and clinical documentation reviewed.     IMPRESSION:  # ESRD, dependent on HD qTTS    # Hypotension, acute on chronic  - On 15 mg midodrine TID    # Anemia of CKD, below target   - ARMANDO with HD  - Ferritin elevated    # PVD, severe  - On Plavix     # Acute on chronic respiratory failure, improved   - s/p R thora 7/8 with 2 L removed  - On supp O2 via NC    # HFrEF  - EF 20%%     # CKD-MBD  - phos and Ca wnl  - On calcitriol and sevelamer     # Hyperthyroidism, on methimazole    # HLD, on statin therapy    # BPH, on tamsulosin    # CAD: History of 80% ostial LAD in-stent restenosis    # Dry Gangrene of L. Hand   - s/p ligation LUE AVF 6/23     Plan:  -PUF today Monday  -HD next Tuesday per prosper  -Dose all meds for ESRD  -Continue calcitriol, and sevelamer with meals  -ARMANDO with HD  -Transfuse PRN Hgb <7   -Strict I/O    Pt is at high risk for further morbidity/mortality with poor overall prognosis     Thank you

## 2021-07-19 NOTE — THERAPY
Occupational Therapy  Daily Treatment     Patient Name: Luis Sepulveda  Age:  75 y.o., Sex:  male  Medical Record #: [id number]  Today's Date: 7/19/2021        Precautions: Fall Risk, Swallow Precautions ( See Comments)  Comments: B BKA    Assessment    Pt seen for OT tx. Continues to be limited by decreased activity tolerance and generalized weakness impacting ability to complete ADLs and ADL transfers independently. L hand continues to be grossly limited by ischemic changes impacting ROM to assist w/ ADLs. Min A seated grooming. Total A pericare. Pt expressed motivated to regain strength and independence in ADLs and ADL transfers. Will continue to benefit from OT services while in house.     Plan    Continue current treatment plan.    DC Equipment Recommendations: Unable to determine at this time  Discharge Recommendations: Recommend post-acute placement for additional occupational therapy services prior to discharge home       07/19/21 1225   Cognition    Cognition / Consciousness X   New Learning Impaired   Attention Impaired   Comments pleasant and cooperative, redirectable    Active ROM Upper Body   Active ROM Upper Body  X   Comments L fingers limited at DIP/PIP joints   Strength Upper Body   Upper Body Strength  X   Comments L hand  strength limited d/t ischemic changes to digits but attempts to use support EOB and for ADLs    Balance   Sitting Balance (Static) Fair   Sitting Balance (Dynamic) Fair -   Weight Shift Sitting Fair   Bed Mobility    Supine to Sit Minimal Assist   Sit to Supine   (left up EOB)   Scooting Minimal Assist   Rolling Minimum Assist to Lt.;Moderate Assist to Rt.   Activities of Daily Living   Grooming Minimal Assist;Seated   Upper Body Dressing Minimal Assist   Toileting Total Assist   Functional Mobility   Sit to Stand Unable to Participate   Short Term Goals   Short Term Goal # 1 Pt will complete ADL transfers with Simone   Goal Outcome # 1 Goal not met   Short Term Goal # 2  Pt will complete LB dressing with min A bed level    Goal Outcome # 2 Goal not met   Short Term Goal # 3 Pt will complete toileting with min A   Goal Outcome # 3 Goal not met   Short Term Goal # 4 Pt will self-feed using L hand and AE with supv    Goal Outcome # 4 Progressing as expected   Anticipated Discharge Equipment and Recommendations   DC Equipment Recommendations Unable to determine at this time   Discharge Recommendations Recommend post-acute placement for additional occupational therapy services prior to discharge home

## 2021-07-19 NOTE — THERAPY
Physical Therapy   Daily Treatment     Patient Name: Luis Sepulveda  Age:  75 y.o., Sex:  male  Medical Record #: [id number]  Today's Date: 7/19/2021     Precautions: Fall Risk, Swallow Precautions ( See Comments)    Assessment    Pt was receptive to PT. Pt required some assist to get to EOB but had good static and dynamic balance once in sitting. He asked if he could be given exercises to perform on his own in his room. PT verbally explained the exercises and the pt was able to physically demonstrate that he could perform them safely and correctly. PT then noticed approx 15 deg of knee extension missing. As pt is a B BKA and requires knee ext to properly use prosthetics, recommend beginning stretching program to restore normal ROM. Pt was taken to dialysis before stretching could be performed.      Plan    Continue current treatment plan.    DC Equipment Recommendations: Unable to determine at this time  Discharge Recommendations: Recommend post-acute placement for additional physical therapy services prior to discharge home         Objective       07/19/21 1145   Pain 0 - 10 Group   Therapist Pain Assessment Post Activity Pain Same as Prior to Activity;Nurse Notified;0   Cognition    Cognition / Consciousness WDL   Level of Consciousness Alert   Passive ROM Lower Body   Passive ROM Lower Body X   Lt Knee Extension Degrees (!) -15   Rt Knee Extension Degrees (!) -15   Comments knee flexion contracture may be evident    Active ROM Lower Body    Active ROM Lower Body  X   Lt Knee Extension Degrees (!) -15   Rt Knee Extension Degrees (!) -15   Comments knee flexion contracture may be evident   Sitting Lower Body Exercises   Sitting Lower Body Exercises Yes   Long Arc Quad Bilateral;2 sets of 15  (5 sec hold)   Marching 2 sets of 15  (5 sec hold)   Other Exercises gluteal squeeze  (1 sets of 10, 5 sec hold)   Home Exercise Program   Home Exercise Program Patient Able to Complete Home Program Independently, Safely    Comments Pt advised to complete exercises performed today 3-4 times a day or as tolderated   Balance   Sitting Balance (Static) Fair   Sitting Balance (Dynamic) Fair -   Weight Shift Sitting Fair   Skilled Intervention Verbal Cuing;Compensatory Strategies;Facilitation   Bed Mobility    Supine to Sit Minimal Assist   Scooting Minimal Assist   Rolling Minimal Assist to Rt.   Skilled Intervention Facilitation;Verbal Cuing;Tactile Cuing   Patient / Family Goals    Patient / Family Goal #1 standing   Goal #1 Outcome Goal not met   Short Term Goals    Short Term Goal # 1 Pt will perform bed mobility with supv within 6 visits   Goal Outcome # 1 goal not met   Short Term Goal # 2 Pt will transfer bed<->W/C with supv within 6 visits in order to return home   Goal Outcome # 2 Goal not met   Short Term Goal # 3 Pt will transfer sit to stand with min A with R prosthesis and FWW within 6 visits in order to promote return home   Goal Outcome # 3 Goal not met

## 2021-07-20 NOTE — DISCHARGE PLANNING
Received Choice form at 1450  Agency/Facility Name: [facility], [place], [place] [place]  Referral sent per Choice form @ 1450    RN LAZARO johnson

## 2021-07-20 NOTE — PROGRESS NOTES
Hospital Medicine Daily Progress Note    Date of Service  7/20/2021    Chief Complaint  Shortness of breath and cough    Hospital Course  Luis Sepulveda is a 75 y.o. male w/ hx of CAD w/ stent, HFrEF:30%, DM,ESRD on HD, HTN, Afib, PVD w/ bilateral BKA admitted 7/7/2021 with acute respiratory failure despite home 4L oxygen and was found to have a right sided pleural effusion and volume overload.  He had a thoracentesis on 7/8/21 w/ 2050ml of fluids removed. After his thoracentesis he became hypotensive and required pressor support in the ICU.  He was weaned off norepinephrine on 7/13am.  Patient was transferred to the telemetry floor with p.o. midodrine.. Patient continue with routine dialysis as per renal MD. patient was referred to a skilled facility for rehabilitation    7/19:   As per floor RN patient hadbrief  transient change in mental status that resolved as the patient was placed in the Trendelenburg position.  Patient's  vitals were within normal limits at that time however this quick resolution  Does suggest intravascular volume depletion as a likely cause.  The floor RN gave the patient midodrine as scheduled    casemanager working on snf     Interval Problem Update  Patient mother at bedside and found AAOX4, afebrile and in NAD  Hypotension controlled with midodrine  Requiring 5 L nasal cannula to maintain saturations  Potassium at 5.6 today  Nephrology following, patient for hemodialysis today  PT/OT recommending SNF, referral placed  Pending SNF placement for discharge    I have personally seen and examined the patient at bedside. I discussed the plan of care with patient    Consultants/Specialty  Nephrology    Code Status  DNAR/DNI    Disposition  Patient is medically cleared.   Anticipate discharge to to skilled nursing facility.  I have placed the appropriate orders for post-discharge needs.    Review of Systems  Review of Systems   Constitutional: Negative for fever and malaise/fatigue.    HENT: Negative for congestion.    Eyes: Negative for blurred vision.   Respiratory: Negative for cough and shortness of breath.    Cardiovascular: Negative for chest pain and palpitations.   Gastrointestinal: Negative for abdominal pain and nausea.   Musculoskeletal: Negative for joint pain.   Neurological: Negative for speech change and headaches.   Psychiatric/Behavioral: The patient is not nervous/anxious.         Physical Exam  Temp:  [36.1 °C (96.9 °F)-37.2 °C (98.9 °F)] 36.1 °C (96.9 °F)  Pulse:  [70-90] 90  Resp:  [14-17] 16  BP: ()/(36-73) 103/41  SpO2:  [92 %-98 %] 93 %    Physical Exam  Vitals reviewed.   Constitutional:       Appearance: Normal appearance. He is not diaphoretic.   HENT:      Head: Normocephalic and atraumatic.      Nose: Nose normal.      Mouth/Throat:      Mouth: Mucous membranes are moist.      Pharynx: No oropharyngeal exudate.   Eyes:      General: No scleral icterus.        Right eye: No discharge.         Left eye: No discharge.      Extraocular Movements: Extraocular movements intact.      Conjunctiva/sclera: Conjunctivae normal.   Cardiovascular:      Rate and Rhythm: Normal rate and regular rhythm.      Pulses:           Radial pulses are 2+ on the right side and 2+ on the left side.      Heart sounds: No murmur heard.     Pulmonary:      Effort: Pulmonary effort is normal. No respiratory distress.      Breath sounds: Normal breath sounds. No wheezing or rales.   Abdominal:      General: Bowel sounds are normal. There is no distension.      Palpations: Abdomen is soft.   Musculoskeletal:         General: No swelling or tenderness.      Cervical back: No tenderness. No muscular tenderness.      Comments: Bilateral BKA.    Chronic gangrenous changes left 2nd, 3rd and 4th fingers   Skin:     Coloration: Skin is not jaundiced or pale.   Neurological:      General: No focal deficit present.      Mental Status: He is alert and oriented to person, place, and time. Mental status  is at baseline.      Cranial Nerves: No cranial nerve deficit.   Psychiatric:         Mood and Affect: Mood normal.         Behavior: Behavior normal.         Fluids    Intake/Output Summary (Last 24 hours) at 7/20/2021 1519  Last data filed at 7/19/2021 1541  Gross per 24 hour   Intake 360 ml   Output --   Net 360 ml       Laboratory  Recent Labs     07/18/21  0308 07/19/21  0202 07/20/21  0059   WBC 6.4 6.6 6.3   RBC 2.63* 2.76* 2.53*   HEMOGLOBIN 8.3* 8.6* 7.9*   HEMATOCRIT 27.0* 28.3* 25.6*   .7* 102.5* 101.2*   MCH 31.6 31.2 31.2   MCHC 30.7* 30.4* 30.9*   RDW 53.6* 53.5* 53.2*   PLATELETCT 295 336 312   MPV 8.9* 9.0 8.6*     Recent Labs     07/18/21  0308 07/19/21  0202 07/20/21  0059   SODIUM 136 134* 135   POTASSIUM 5.0 5.2 5.6*   CHLORIDE 98 97 98   CO2 29 28 28   GLUCOSE 107* 94 88   BUN 19 27* 35*   CREATININE 3.93* 5.12* 5.88*   CALCIUM 8.9 9.3 9.1                   Imaging  MR-HAND - W/O LEFT   Final Result      1.  No evidence of osteomyelitis.      2.  Soft tissue loss dorsally and distally involving the second, third and fourth digits.      DX-CHEST-PORTABLE (1 VIEW)   Final Result      1. Improving moderate right pleural effusion.   2. Allowing for differences in technique, the remainder is stable.      DX-HAND 3+ LEFT   Final Result      1.  Multiple sites of osteoarthritis      2.  Small vessel atherosclerotic plaque      3.  No radiographic evidence for osteomyelitis      DX-CHEST-PORTABLE (1 VIEW)   Final Result      1.  Left internal jugular catheter appears appropriately located      2.  Pulmonary edema      3.  Right pleural effusion, probably moderate in size      4.  Bilateral atelectasis      DX-CHEST-PORTABLE (1 VIEW)   Final Result      1.  Interval increased aeration and improvement of RIGHT pleural effusion.   2.  No pneumothorax.         US-THORACENTESIS PUNCTURE RIGHT   Final Result   Addendum 1 of 1   Addendum issued to correct volume of fluid withdrawn.  Actual volume of     pleural fluid withdrawn was 2050 mL.      Final      1. Ultrasound guided right sided diagnostic/therapeutic thoracentesis.      2. 2450 mL of fluid withdrawn.      DX-CHEST-PORTABLE (1 VIEW)   Final Result      1.  Moderate to large right pleural effusion with overlying atelectasis/consolidation.   2.  Interstitial prominence likely represents interstitial edema.   3.  Stable cardiomegaly.           Assessment/Plan  * Septic shock (HCC)- (present on admission)  Assessment & Plan  This is Sepsis Present on admission  SIRS criteria identified on my evaluation include: Tachycardia, with heart rate greater than 90 BPM  Source is lung  Sepsis protocol initiated  Fluid resuscitation ordered per protocol  IV antibiotics as appropriate for source of sepsis- completed  While organ dysfunction may be noted elsewhere in this problem list or in the chart, degree of organ dysfunction does not meet CMS criteria for severe sepsis  Cont midodrine  Resolved    Acute on chronic respiratory failure (HCC)- (present on admission)  Assessment & Plan  Likely sec to R pleural effusion and fluid overload  S/p Thoracentesis w/ 2L removed  Nephrology consulting for HD  Duoneb, RT protocol  On 5L NC, titrate as tolerable  Typically on 4L O2 baseline  Pending SNF    Anemia- (present on admission)  Assessment & Plan  Of chronic kidney disease  EPO per nephrology  Monitor cbc    Pleural effusion- (present on admission)  Assessment & Plan  Right side of mod to large pleural effusion  7/7 us thoracentesis with 2L removed    Ischemia of finger- (present on admission)  Assessment & Plan  Dry gangrene/ischemic 2,3,4th fingers  MRI Hand 7/14 reviewed  Wound care.    Acute on chronic systolic congestive heart failure (HCC)- (present on admission)  Assessment & Plan  LVEF 30%  Holding Lisinopril, carvedilol, lasix  2 g salt, fluid restriction   Midodrine for hypotension  Nephrology following for HD  Adjust meds as needed  Pending SNF    Hypotension-  (present on admission)  Assessment & Plan  Continue p.o. midodrine    End stage renal disease on dialysis (HCC)- (present on admission)  Assessment & Plan  Cont dialysis  Monitor labs/vitals.    BPH (benign prostatic hyperplasia)- (present on admission)  Assessment & Plan  tamsulosin for ongoing active treatment.  Mostly anuric from dialysis    Status post below-knee amputation of both lower extremities (HCC)- (present on admission)  Assessment & Plan  S/p BKA b/l. Wound care to left leg.  Right stump unremarkalble    DNR (do not resuscitate)- (present on admission)  Assessment & Plan  Per POLST: patient is DNR/DNI    Goals of care, counseling/discussion- (present on admission)  Assessment & Plan  Referred to snf  Pending SNF    Peripheral vascular disease (HCC)- (present on admission)  Assessment & Plan  Hx of PAD with escar noted on L thumb, index and middle finger  7/14 MRI Hand without sign of osteomyelitis    Paroxysmal atrial fibrillation (HCC)- (present on admission)  Assessment & Plan  Rate control and monitor on telemetry  Holding coreg due to hypotension and currently on midodrine  Cont eliquis    Type 2 diabetes mellitus with kidney complication, with long-term current use of insulin ([facility])- (present on admission)  Assessment & Plan  On SSI and hypoglycemic protocol  A1c 5.7 2 months ago  Diabetic diet    CAD (coronary artery disease)- (present on admission)  Assessment & Plan  History of  Continue outpatient meds: plavix, statin  Holding lisinopril and coreg due to hypotension       VTE prophylaxis: heparin ppx and therapeutic anticoagulation with eliquis    I have performed a physical exam and reviewed and updated ROS and Plan today (7/20/2021). In review of yesterday's note (7/19/2021), there are no changes except as documented above.

## 2021-07-20 NOTE — PROGRESS NOTES
4 Eyes Skin Assessment Completed by JAN Odonnell and JAN Whalen.    Head WDL  Ears Redness and Blanching  Nose WDL  Mouth WDL  Neck WDL  Breast/Chest WDL  Shoulder Blades WDL  Spine WDL  (R) Arm/Elbow/Hand Redness, Blanching, Bruising, Abrasion and Scab  (L) Arm/Elbow/Hand Redness, Blanching, Bruising, Abrasion and Scab, L mid three finger gangrene  Abdomen Redness  Groin Redness  Scrotum/Coccyx/Buttocks Redness, Blanching and Excoriation  (R) Leg Redness, Blanching, surgical wound covered with eschar, BKA  (L) Leg Redness and Blanching, BKA      Devices In Places Nasal Cannula      Interventions In Place Gray Ear Foams, Waffle Overlay, Pillows, Q2 Turns, Barrier Cream, Heels Loaded W/Pillows and Pressure Redistribution Mattress    Possible Skin Injury No    Pictures Uploaded Into Epic N/A  Wound Consult Placed N/A  RN Wound Prevention Protocol Ordered No

## 2021-07-20 NOTE — PALLIATIVE CARE
Palliative Care follow-up  Attempted to visit pt at bedside, pt not in room. Pt down for HD.         Plan:   Continue to discuss GOC as clinical picture unfolds.       Thank you for allowing Palliative Care to participate in this patient's care. Please feel free to call [phone] with any questions or concerns.

## 2021-07-20 NOTE — CARE PLAN
The patient is Stable - Low risk of patient condition declining or worsening    Shift Goals  Clinical Goals: stable hemodynamics  Patient Goals: Rest  Family Goals: safety    Progress made toward(s) clinical / shift goals:  Pt whiteboard updated on plan of care. Pt encouraged to call for assistance. Pt encouraged to voice any concerns or questions regarding care plan. Pt updated on plan of care as it develops and changes. Fall precautions in place. Bed in lowest position. Non-skid socks in place. Personal possessions within reach. Mobility sign on door. Bed-alarm on. Call light within reach. Pt educated regarding fall prevention and states understanding. Pt will verbalize pain using 0-10 pain scale, when to ask for medications, and medication information.      Problem: Skin Integrity  Goal: Skin integrity is maintained or improved  Outcome: Progressing     Problem: Pain - Standard  Goal: Alleviation of pain or a reduction in pain to the patient’s comfort goal  Outcome: Progressing     Problem: Fall Risk  Goal: Patient will remain free from falls  Outcome: Progressing

## 2021-07-20 NOTE — PROGRESS NOTES
Received report from day shift RNTamiko. Assumed care of pt @ 1900. Pt reports 9/10 pain in left fingers at this time, see MAR. Updated pt on plan of care. Pt resting comfortably in bed. Fall precautions in place. Educated on use of call light. Hourly rounding and continuous monitoring in place.

## 2021-07-20 NOTE — THERAPY
"Speech Language Pathology  Daily Treatment     Patient Name: Luis Sepulveda  Age:  75 y.o., Sex:  male  Medical Record #: [id number]  Today's Date: 7/19/2021     Precautions  Precautions: (P) Fall Risk, Swallow Precautions ( See Comments)  Comments: B BKA    Assessment    Patient was seen for dysphagia tx with focus on compensatory strategy and swallow exercise training with PO of liquidized solids and mildly thick liquids via cup. Congestive cough noted at baseline, which persisted with PO intake, making it difficult to discern true s/sx of aspiration. Pt is requesting a diet upgrade and verbalized limited awareness of his swallowing deficit and potential impact his health. Extensive education provided re: FEES results and pt's risk for developing aspiration PNA. He agreed to trial swallowing exercises and stay on current diet pending a repeat swallow diagnostic later this week if still hospitalized. Min cues provided for pt to complete effortful swallows x20, base of tongue exercises x10 and laryngeal elevation exercises x10. Pt was provided a handout and instructed to complete swallow exercises 3x/day after meals x30 reps each. Pt completed 3 swallows per bolus as instructed with PO trials. Recommend continuing current Liquidised Diet (LQ3) with Mildly Thick Liquids (MT2) with monitoring during meals. Crush pills in puree. Oral care after meals to reduce the risk of aspiration PNA. Pt will need a repeat diagnostic swallow evaluation (FEES or MBS) prior to diet ugprade.      Plan    Continue current treatment plan.    Discharge Recommendations: (P) Recommend post-acute placement for additional speech therapy services prior to discharge home    Subjective    \"I want to spend time with my family and be able to eat good food and travel.\"     Objective       07/19/21 5798   Dysphagia    Positioning / Behavior Modification Multiple Swallows;Modulate Rate or Bite Size;Effortful Swallow;Self Monitoring;Cough / Clear " "after Swallow   Oral / Pharyngeal / Laryngeal Exercises Laryngeal Exercises;Base of Tongue Exercises;Pharyngeal Constriction Exercises   Other Treatments PO of LQ3/MT2   Diet / Liquid Recommendation Liquidised (3) - (Nectar Thick Full Liquid);Mildly Thick (2) - (Nectar Thick)   Recommended Route of Medication Administration   Medication Administration  Crush all Medications in Puree   Patient / Family Goals   Patient / Family Goal #1 Revised after FEES: \"I want to talk about my goals\"    Goal #1 Outcome Progressing as expected   Short Term Goals   Short Term Goal # 1 B  NEW after FEES: Patient will consume LQ3/MT2 diet with assistance with no overt s/sx of aspiration.    Goal Outcome  # 1 B Progressing as expected   Short Term Goal # 2 7/19: Patient will complete 3 swallowing exercises x30 reps each per session with min cues.         "

## 2021-07-20 NOTE — PROGRESS NOTES
4 Eyes Skin Assessment Completed by JAN Morrison and JAN Riggs.    Head WDL  Ears WDL  Nose WDL  Mouth WDL  Neck WDL  Breast/Chest WDL  Shoulder Blades WDL  Spine WDL  (R) Arm/Elbow/Hand Redness, Blanching and Bruising  (L) Arm/Elbow/Hand Redness, Blanching and Bruising  Abdomen Redness  Groin Redness  Scrotum/Coccyx/Buttocks Redness and Excoriation  (R) Leg Redness, Blanching and Scab,BKA  (L) Leg Redness, Blanching and Scab, BKA            Devices In Places Tele Box and Nasal Cannula      Interventions In Place Gray Ear Foams, Waffle Overlay, Pillows, Q2 Turns, Heels Loaded W/Pillows and Pressure Redistribution Mattress    Possible Skin Injury No    Pictures Uploaded Into Epic N/A  Wound Consult Placed N/A  RN Wound Prevention Protocol Ordered No

## 2021-07-20 NOTE — PROGRESS NOTES
"[facility] -  PROGRESS NOTE               Author: Kulwant Hodges M.D. Date & Time: 7/20/2021  11:17 AM     HPI:  76 y/o man with ESRD HD T,TH,SAt presented with SOB. Pt has reccurrent pleural effusion. Pt /sp thoracentisis in the past.  Pt with recent ligation of left arm AVF  Pt makes a small amount of urine    DAILY NEPHROLOGY SUMMARY:  7/9: HD yesterday 1 liter UF, but transferred to ICU for low BP. He is on pressors now  7/10: Pt sitting up in bed, comfortable, L IJ CVC placed yesterday, on 15 mcg levo gtt, completed iHD today, resp status stable  7/11: remains on pressors, tolerated HD, no new c/o   7/12:  Remains on pressors, long discussion regarding GOC, overall malaise   7/13: No acute events, resting comfortably, dialysis planned ~11am  7/14: No acute events, Norepi weaned off. Ongoing GOC discussions with pt/family, sounds like hoping to optimize comfort going forward   7/15: Ongoing GOC discussions. Palliative re-involved yesterday. Long talk with wife and Adalid. Goal remains to optimize at home. Ongoni hand pain. No CP or SOB. Seen on HD.  7/16: Transferred out of the ICU, tolerated HD-net neg 1.5L, patient decided against hospice for now  7/17: Seen on HD. Low Bps but stable. DISPO pending   7/19: Productive cough, unable to clear secretions  7/20: PUF yesterday 2 liter UF, inconstatnt mentation    REVIEW OF SYSTEMS:    10 point ROS negative except stated    PMH/PSH/SH/FH: Reviewed and unchanged since admission note  CURRENT MEDICATIONS: Reviewed from admission to present day    VS:  VS:  /41   Pulse 90   Temp 36.1 °C (96.9 °F) (Temporal)   Resp 16   Ht 1.702 m (5' 7\")   Wt 73.8 kg (162 lb 11.2 oz)   SpO2 93%   BMI 25.48 kg/m²   GENERAL: ill appearing  CV: +edema  RESP: Non-labored  GI: Soft  MSK: BL BKA   SKIN: +ecchymosis, necrotic L. Digits   NEURO: AOx3  PSYCH: Cooperative    Fluids:  In: 1100 [P.O.:600; Dialysis:500]  Out: 2500     LABS:  Recent Labs     " 07/18/21  0308 07/19/21  0202 07/20/21  0059   SODIUM 136 134* 135   POTASSIUM 5.0 5.2 5.6*   CHLORIDE 98 97 98   CO2 29 28 28   GLUCOSE 107* 94 88   BUN 19 27* 35*   CREATININE 3.93* 5.12* 5.88*   CALCIUM 8.9 9.3 9.1     Available labs, imaging and clinical documentation reviewed.     IMPRESSION:  # ESRD, dependent on HD qTTS    # Hypotension, acute on chronic  - On 15 mg midodrine TID    # Anemia of CKD, below target   - ARMANDO with HD  - Ferritin elevated    # PVD, severe  - On Plavix     # Acute on chronic respiratory failure, improved   - s/p R thora 7/8 with 2 L removed  - On supp O2 via NC    # HFrEF  - EF 20%%     # CKD-MBD  - phos and Ca wnl  - On calcitriol and sevelamer     # Hyperthyroidism, on methimazole    # HLD, on statin therapy    # BPH, on tamsulosin    # CAD: History of 80% ostial LAD in-stent restenosis    # Dry Gangrene of L. Hand   - s/p ligation LUE AVF 6/23     Plan:  -HD today Tuesday   -Dose all meds for ESRD  -Continue calcitriol, and sevelamer with meals  -ARMANDO with HD  -Transfuse PRN Hgb <7   -Strict I/O    Pt is at high risk for further morbidity/mortality with poor overall prognosis     Thank you

## 2021-07-20 NOTE — CARE PLAN
The patient is Stable - Low risk of patient condition declining or worsening    Shift Goals  Clinical Goals: stable hemodynamics  Patient Goals: Rest  Family Goals: safety    Progress made toward(s) clinical / shift goals:    Problem: Care Map:  Admission Optimal Outcome for the Heart Failure Patient  Goal: Admission:  Optimal Care of the heart failure patient  Outcome: Progressing     Problem: Care Map:  Day 1 Optimal Outcome for the Heart Failure Patient  Goal: Day 1:  Optimal Care of the heart failure patient  Outcome: Progressing     Problem: Care Map:  Day 2 Optimal Outcome for the Heart Failure Patient  Goal: Day 2:  Optimal Care of the heart failure patient  Outcome: Progressing     Problem: Care Map:  Day 3 Optimal Outcome for the Heart Failure Patient  Goal: Day 3:  Optimal Care of the heart failure patient  Outcome: Progressing     Problem: Care Map:  Day Before Discharge Optimal Outcome for the Heart Failure Patient  Goal: Day Before Discharge:  Optimal Care of the heart failure patient  Outcome: Progressing     Problem: Care Map:  Day of Discharge Optimal Outcome for the Heart Failure Patient  Goal: Day of Discharge:  Optimal Care of the heart failure patient  Outcome: Progressing     Problem: Skin Integrity  Goal: Skin integrity is maintained or improved  Outcome: Progressing     Problem: Pain - Standard  Goal: Alleviation of pain or a reduction in pain to the patient’s comfort goal  Outcome: Progressing     Problem: Fall Risk  Goal: Patient will remain free from falls  Outcome: Progressing

## 2021-07-20 NOTE — PROGRESS NOTES
Hemodialysis ordered by Dr. Hodges. Treatment started at 1151 and ended at 1451. Hypotensive t/o treatment. NS bolus 100 ml. Pt asymptomatic. Dr. Hodges notified and aware. Pt stable, vss, no c/o post tx. See flow sheets for details. Net UF 1.0 L. Reported to ANDREY Patterson RN.

## 2021-07-21 NOTE — DISCHARGE PLANNING
Agency/Facility Name: [facility]  Spoke To: Alanna  Outcome: Pt accepted. Bed and transport available 7/22 @ 1200.    RN CM informed

## 2021-07-21 NOTE — PROGRESS NOTES
Hospital Medicine Daily Progress Note    Date of Service  7/21/2021    Chief Complaint  Shortness of breath and cough    Hospital Course  Luis Sepulveda is a 75 y.o. male w/ hx of CAD w/ stent, HFrEF:30%, DM,ESRD on HD, HTN, Afib, PVD w/ bilateral BKA admitted 7/7/2021 with acute respiratory failure despite home 4L oxygen and was found to have a right sided pleural effusion and volume overload.  He had a thoracentesis on 7/8/21 w/ 2050ml of fluids removed. After his thoracentesis he became hypotensive and required pressor support in the ICU.  He was weaned off norepinephrine on 7/13am.  Patient was transferred to the telemetry floor with p.o. midodrine.. Patient continue with routine dialysis as per renal MD. patient was referred to a skilled facility for rehabilitation    7/19:   As per floor RN patient hadbrief  transient change in mental status that resolved as the patient was placed in the Trendelenburg position.  Patient's  vitals were within normal limits at that time however this quick resolution  Does suggest intravascular volume depletion as a likely cause.  The floor RN gave the patient midodrine as scheduled    casemanager working on snf     Interval Problem Update  Patient mother at bedside and found AAOX4, afebrile and in NAD  Hypotension controlled with midodrine  Requiring 4 L nasal cannula to maintain saturations  Potassium stable today  Nephrology following, patient for hemodialysis today  PT/OT recommending SNF, referral placed  Pending SNF placement for discharge    I have personally seen and examined the patient at bedside. I discussed the plan of care with patient    Consultants/Specialty  Nephrology    Code Status  DNAR/DNI    Disposition  Patient is medically cleared.   Anticipate discharge to to skilled nursing facility.  I have placed the appropriate orders for post-discharge needs.    Review of Systems  Review of Systems   Constitutional: Negative for fever and malaise/fatigue.    HENT: Negative for congestion.    Eyes: Negative for blurred vision.   Respiratory: Negative for cough and shortness of breath.    Cardiovascular: Negative for chest pain and palpitations.   Gastrointestinal: Negative for abdominal pain and nausea.   Musculoskeletal: Negative for joint pain.   Neurological: Negative for speech change and headaches.   Psychiatric/Behavioral: The patient is not nervous/anxious.         Physical Exam  Temp:  [36.1 °C (96.9 °F)-36.3 °C (97.3 °F)] 36.1 °C (97 °F)  Pulse:  [85-95] 95  Resp:  [18] 18  BP: ()/(34-70) 107/44  SpO2:  [97 %-98 %] 97 %    Physical Exam  Vitals reviewed.   Constitutional:       Appearance: Normal appearance. He is not diaphoretic.   HENT:      Head: Normocephalic and atraumatic.      Nose: Nose normal.      Mouth/Throat:      Mouth: Mucous membranes are moist.      Pharynx: No oropharyngeal exudate.   Eyes:      General: No scleral icterus.        Right eye: No discharge.         Left eye: No discharge.      Extraocular Movements: Extraocular movements intact.      Conjunctiva/sclera: Conjunctivae normal.   Cardiovascular:      Rate and Rhythm: Normal rate and regular rhythm.      Pulses:           Radial pulses are 2+ on the right side and 2+ on the left side.      Heart sounds: No murmur heard.     Pulmonary:      Effort: Pulmonary effort is normal. No respiratory distress.      Breath sounds: Normal breath sounds. No wheezing or rales.   Abdominal:      General: Bowel sounds are normal. There is no distension.      Palpations: Abdomen is soft.   Musculoskeletal:         General: No swelling or tenderness.      Cervical back: No tenderness. No muscular tenderness.      Comments: Bilateral BKA.    Chronic gangrenous changes left 2nd, 3rd and 4th fingers   Skin:     Coloration: Skin is not jaundiced or pale.   Neurological:      General: No focal deficit present.      Mental Status: He is alert and oriented to person, place, and time. Mental status is at  baseline.      Cranial Nerves: No cranial nerve deficit.   Psychiatric:         Mood and Affect: Mood normal.         Behavior: Behavior normal.         Fluids  No intake or output data in the 24 hours ending 07/21/21 1519    Laboratory  Recent Labs     07/19/21  0202 07/20/21 0059 07/21/21  1045   WBC 6.6 6.3 6.9   RBC 2.76* 2.53* 2.77*   HEMOGLOBIN 8.6* 7.9* 8.6*   HEMATOCRIT 28.3* 25.6* 28.3*   .5* 101.2* 102.2*   MCH 31.2 31.2 31.0   MCHC 30.4* 30.9* 30.4*   RDW 53.5* 53.2* 54.1*   PLATELETCT 336 312 295   MPV 9.0 8.6* 8.7*     Recent Labs     07/19/21 0202 07/20/21 0059 07/21/21  1045   SODIUM 134* 135 136   POTASSIUM 5.2 5.6* 4.8   CHLORIDE 97 98 99   CO2 28 28 28   GLUCOSE 94 88 129*   BUN 27* 35* 25*   CREATININE 5.12* 5.88* 4.97*   CALCIUM 9.3 9.1 8.8                   Imaging  MR-HAND - W/O LEFT   Final Result      1.  No evidence of osteomyelitis.      2.  Soft tissue loss dorsally and distally involving the second, third and fourth digits.      DX-CHEST-PORTABLE (1 VIEW)   Final Result      1. Improving moderate right pleural effusion.   2. Allowing for differences in technique, the remainder is stable.      DX-HAND 3+ LEFT   Final Result      1.  Multiple sites of osteoarthritis      2.  Small vessel atherosclerotic plaque      3.  No radiographic evidence for osteomyelitis      DX-CHEST-PORTABLE (1 VIEW)   Final Result      1.  Left internal jugular catheter appears appropriately located      2.  Pulmonary edema      3.  Right pleural effusion, probably moderate in size      4.  Bilateral atelectasis      DX-CHEST-PORTABLE (1 VIEW)   Final Result      1.  Interval increased aeration and improvement of RIGHT pleural effusion.   2.  No pneumothorax.         US-THORACENTESIS PUNCTURE RIGHT   Final Result   Addendum 1 of 1   Addendum issued to correct volume of fluid withdrawn.  Actual volume of    pleural fluid withdrawn was 2050 mL.      Final      1. Ultrasound guided right sided  diagnostic/therapeutic thoracentesis.      2. 2450 mL of fluid withdrawn.      DX-CHEST-PORTABLE (1 VIEW)   Final Result      1.  Moderate to large right pleural effusion with overlying atelectasis/consolidation.   2.  Interstitial prominence likely represents interstitial edema.   3.  Stable cardiomegaly.           Assessment/Plan  * Septic shock (HCC)- (present on admission)  Assessment & Plan  This is Sepsis Present on admission  SIRS criteria identified on my evaluation include: Tachycardia, with heart rate greater than 90 BPM  Source is lung  Sepsis protocol initiated  Fluid resuscitation ordered per protocol  IV antibiotics as appropriate for source of sepsis- completed  While organ dysfunction may be noted elsewhere in this problem list or in the chart, degree of organ dysfunction does not meet CMS criteria for severe sepsis  Cont midodrine  Resolved    Acute on chronic respiratory failure (HCC)- (present on admission)  Assessment & Plan  Likely sec to R pleural effusion and fluid overload  S/p Thoracentesis w/ 2L removed  Nephrology consulting for HD  Duoneb, RT protocol  On 5L NC, titrate as tolerable  Typically on 4L O2 baseline  Pending SNF    Anemia- (present on admission)  Assessment & Plan  Of chronic kidney disease  EPO per nephrology  Monitor cbc    Pleural effusion- (present on admission)  Assessment & Plan  Right side of mod to large pleural effusion  7/7 us thoracentesis with 2L removed    Ischemia of finger- (present on admission)  Assessment & Plan  Dry gangrene/ischemic 2,3,4th fingers  MRI Hand 7/14 reviewed  Wound care.    Acute on chronic systolic congestive heart failure (HCC)- (present on admission)  Assessment & Plan  LVEF 30%  Holding Lisinopril, carvedilol, lasix  2 g salt, fluid restriction   Midodrine for hypotension  Nephrology following for HD  Adjust meds as needed  Pending SNF    Hypotension- (present on admission)  Assessment & Plan  Continue p.o. midodrine    End stage renal  disease on dialysis (HCC)- (present on admission)  Assessment & Plan  Cont dialysis  Monitor labs/vitals.    BPH (benign prostatic hyperplasia)- (present on admission)  Assessment & Plan  tamsulosin for ongoing active treatment.  Mostly anuric from dialysis    Status post below-knee amputation of both lower extremities (HCC)- (present on admission)  Assessment & Plan  S/p BKA b/l. Wound care to left leg.  Right stump unremarkalble    DNR (do not resuscitate)- (present on admission)  Assessment & Plan  Per POLST: patient is DNR/DNI    Goals of care, counseling/discussion- (present on admission)  Assessment & Plan  Referred to snf  Pending SNF    Peripheral vascular disease (HCC)- (present on admission)  Assessment & Plan  Hx of PAD with escar noted on L thumb, index and middle finger  7/14 MRI Hand without sign of osteomyelitis    Paroxysmal atrial fibrillation (HCC)- (present on admission)  Assessment & Plan  Rate control and monitor on telemetry  Holding coreg due to hypotension and currently on midodrine  Cont eliquis    Type 2 diabetes mellitus with kidney complication, with long-term current use of insulin ([facility])- (present on admission)  Assessment & Plan  On SSI and hypoglycemic protocol  A1c 5.7 2 months ago  Diabetic diet    CAD (coronary artery disease)- (present on admission)  Assessment & Plan  History of  Continue outpatient meds: plavix, statin  Holding lisinopril and coreg due to hypotension       VTE prophylaxis: heparin ppx and therapeutic anticoagulation with eliquis    I have performed a physical exam and reviewed and updated ROS and Plan today (7/21/2021). In review of yesterday's note (7/20/2021), there are no changes except as documented above.

## 2021-07-21 NOTE — CARE PLAN
The patient is Stable - Low risk of patient condition declining or worsening    Shift Goals  Clinical Goals: stable hemodynamics  Patient Goals: Rest  Family Goals: safety    Progress made toward(s) clinical / shift goals:  Pt whiteboard updated on plan of care. Pt encouraged to call for assistance. Pt encouraged to voice any concerns or questions regarding care plan. Pt updated on plan of care as it develops and changes. Fall precautions in place. Bed in lowest position. Non-skid socks in place. Personal possessions within reach. Mobility sign on door. Bed-alarm on. Call light within reach. Pt educated regarding fall prevention and states understanding. Pt will verbalize pain using 0-10 pain scale, when to ask for pain medications, and medication information.      Problem: Skin Integrity  Goal: Skin integrity is maintained or improved  Outcome: Progressing     Problem: Pain - Standard  Goal: Alleviation of pain or a reduction in pain to the patient’s comfort goal  Outcome: Progressing     Problem: Fall Risk  Goal: Patient will remain free from falls  Outcome: Progressing

## 2021-07-21 NOTE — PROGRESS NOTES
4 Eyes Skin Assessment Completed by JAN Morrison and JAN Rosado.    Head WDL  Ears WDL  Nose WDL  Mouth WDL  Neck WDL  Breast/Chest WDL  Shoulder Blades WDL  Spine WDL  (R) Arm/Elbow/Hand Redness, Bruising, Abrasion and Scab  (L) Arm/Elbow/Hand Redness, Bruising, Abrasion and Scab  Abdomen Redness  Groin Redness  Scrotum/Coccyx/Buttocks Redness, Blanching and Excoriation  (R) Leg Redness, Blanching and Scab, BKA  (L) Leg Redness and Blanching, BKA          Devices In Places Nasal Cannula      Interventions In Place Gray Ear Foams, Waffle Overlay, Pillows, Q2 Turns, Barrier Cream, Heels Loaded W/Pillows and Pressure Redistribution Mattress    Possible Skin Injury No    Pictures Uploaded Into Epic N/A  Wound Consult Placed N/A  RN Wound Prevention Protocol Ordered No

## 2021-07-21 NOTE — PROGRESS NOTES
"[facility] -  PROGRESS NOTE               Author: Kulwant Hodges M.D. Date & Time: 7/21/2021  9:35 AM     HPI:  74 y/o man with ESRD HD T,TH,SAt presented with SOB. Pt has reccurrent pleural effusion. Pt /sp thoracentisis in the past.  Pt with recent ligation of left arm AVF  Pt makes a small amount of urine    DAILY NEPHROLOGY SUMMARY:  7/9: HD yesterday 1 liter UF, but transferred to ICU for low BP. He is on pressors now  7/10: Pt sitting up in bed, comfortable, L IJ CVC placed yesterday, on 15 mcg levo gtt, completed iHD today, resp status stable  7/11: remains on pressors, tolerated HD, no new c/o   7/12:  Remains on pressors, long discussion regarding GOC, overall malaise   7/13: No acute events, resting comfortably, dialysis planned ~11am  7/14: No acute events, Norepi weaned off. Ongoing GOC discussions with pt/family, sounds like hoping to optimize comfort going forward   7/15: Ongoing GOC discussions. Palliative re-involved yesterday. Long talk with wife and Adalid. Goal remains to optimize at home. Ongoni hand pain. No CP or SOB. Seen on HD.  7/16: Transferred out of the ICU, tolerated HD-net neg 1.5L, patient decided against hospice for now  7/17: Seen on HD. Low Bps but stable. DISPO pending   7/19: Productive cough, unable to clear secretions  7/20: PUF yesterday 2 liter UF, inconstatnt mentation  7/21: HD yesterday 1 liter UF, pt with worsening right hand tremor    REVIEW OF SYSTEMS:    10 point ROS negative except stated    PMH/PSH/SH/FH: Reviewed and unchanged since admission note  CURRENT MEDICATIONS: Reviewed from admission to present day    VS:  VS:  BP (!) 83/70 Comment: Rn notified   Pulse 95   Temp 36.1 °C (97 °F) (Temporal)   Resp 18   Ht 1.702 m (5' 7\")   Wt 73.8 kg (162 lb 11.2 oz)   SpO2 97%   BMI 25.48 kg/m²   GENERAL: ill appearing  CV: RR  Neck: tunnel cath left IJ  RESP: Non-labored  GI: Soft  MSK: BL BKA   SKIN: +ecchymosis, necrotic L. Digits   NEURO: " AOx3  PSYCH: Cooperative    Fluids:  In: 600 [Dialysis:600]  Out: 1600     LABS:  Recent Labs     07/19/21  0202 07/20/21  0059   SODIUM 134* 135   POTASSIUM 5.2 5.6*   CHLORIDE 97 98   CO2 28 28   GLUCOSE 94 88   BUN 27* 35*   CREATININE 5.12* 5.88*   CALCIUM 9.3 9.1     Available labs, imaging and clinical documentation reviewed.     IMPRESSION:  # ESRD, dependent on HD qTTS    # Hypotension, acute on chronic  - On 15 mg midodrine TID    # Anemia of CKD, below target   - ARMANDO with HD  - Ferritin elevated    # PVD, severe  - On Plavix     # Acute on chronic respiratory failure, improved   - s/p R thora 7/8 with 2 L removed  - On supp O2 via NC    # HFrEF  - EF 20%%     # CKD-MBD  - phos and Ca wnl  - On calcitriol and sevelamer     # Hyperthyroidism, on methimazole    # HLD, on statin therapy    # BPH, on tamsulosin    # CAD: History of 80% ostial LAD in-stent restenosis    # Dry Gangrene of L. Hand   - s/p ligation LUE AVF 6/23     Plan:  -no HD today Weds, next Thurs   -Dose all meds for ESRD  -Continue calcitriol, and sevelamer with meals  -ARMANDO with HD  -Transfuse PRN Hgb <7   -Strict I/O    Pt is at high risk for further morbidity/mortality with poor overall prognosis     Thank you

## 2021-07-21 NOTE — PROGRESS NOTES
Received report from day shift RNTamiko. Assumed care of pt @ 1900. Pt reports 7/10 pain at this time, see MAR. Updated pt on plan of care. Pt resting comfortably in bed. Fall precautions in place. Educated on use of call light. Hourly rounding and continuous monitoring in place.

## 2021-07-22 NOTE — PROGRESS NOTES
"[facility] -  PROGRESS NOTE               Author: TABATHA Sosa   Collaborating Physician: Kulwant Hodges  Date & Time: 7/22/2021  8:25 AM     HPI:  76 y/o man with ESRD HD T,TH,SAt presented with SOB. Pt has reccurrent pleural effusion. Pt /sp thoracentisis in the past.  Pt with recent ligation of left arm AVF  Pt makes a small amount of urine    DAILY NEPHROLOGY SUMMARY:  7/9: HD yesterday 1 liter UF, but transferred to ICU for low BP. He is on pressors now  7/10: Pt sitting up in bed, comfortable, L IJ CVC placed yesterday, on 15 mcg levo gtt, completed iHD today, resp status stable  7/11: remains on pressors, tolerated HD, no new c/o   7/12:  Remains on pressors, long discussion regarding GOC, overall malaise   7/13: No acute events, resting comfortably, dialysis planned ~11am  7/14: No acute events, Norepi weaned off. Ongoing GOC discussions with pt/family, sounds like hoping to optimize comfort going forward   7/15: Ongoing GOC discussions. Palliative re-involved yesterday. Long talk with wife and Adalid. Goal remains to optimize at home. Ongoni hand pain. No CP or SOB. Seen on HD.  7/16: Transferred out of the ICU, tolerated HD-net neg 1.5L, patient decided against hospice for now  7/17: Seen on HD. Low Bps but stable. DISPO pending   7/19: Productive cough, unable to clear secretions  7/20: PUF yesterday 2 liter UF, inconstatnt mentation  7/21: HD yesterday 1 liter UF, pt with worsening right hand tremor  7/22: No overnight renal events. Seen on HD, resting comfortably.  VSS.      REVIEW OF SYSTEMS:    10 point ROS negative except stated    PMH/PSH/SH/FH: Reviewed and unchanged since admission note  CURRENT MEDICATIONS: Reviewed from admission to present day    VS:  VS:  /80   Pulse 93   Temp 36.2 °C (97.2 °F) (Temporal)   Resp 18   Ht 1.702 m (5' 7\")   Wt 73.8 kg (162 lb 11.2 oz)   SpO2 95%   BMI 25.48 kg/m²   GENERAL: ill appearing  CV: RR  Neck: tunnel cath " left IJ  RESP: Non-labored  GI: Soft  MSK: BL BKA   SKIN: +ecchymosis, necrotic L. Digits   NEURO: AOx3  PSYCH: Cooperative    Fluids:  No intake/output data recorded.    LABS:  Recent Labs     07/20/21  0059 07/21/21  1045 07/22/21  0130   SODIUM 135 136 132*   POTASSIUM 5.6* 4.8 5.2   CHLORIDE 98 99 96   CO2 28 28 28   GLUCOSE 88 129* 95   BUN 35* 25* 30*   CREATININE 5.88* 4.97* 5.56*   CALCIUM 9.1 8.8 9.0     Available labs, imaging and clinical documentation reviewed.     IMPRESSION:  # ESRD, dependent on HD qTTS    # Hypotension, acute on chronic  - On 15 mg midodrine TID    # Anemia of CKD, below target   - ARMANDO with HD  - Ferritin elevated    # PVD, severe  - On Plavix     # Acute on chronic respiratory failure, improved   - s/p R thora 7/8 with 2 L removed  - On supp O2 via NC    # HFrEF  - EF 20%%     # CKD-MBD  - phos and Ca wnl  - On calcitriol and sevelamer     # Hyperthyroidism, on methimazole    # HLD, on statin therapy    # BPH, on tamsulosin    # CAD: History of 80% ostial LAD in-stent restenosis    # Dry Gangrene of L. Hand   - s/p ligation LUE AVF 6/23     Plan:  -HD today Thurs and continue qTTS and prn   -Dose all meds for ESRD  -Continue Midodrine   -Continue calcitriol, and sevelamer with meals  -ARMANDO with HD  -Transfuse PRN Hgb <7   -Strict I/O    Pt is at high risk for further morbidity/mortality with poor overall prognosis     Thank you

## 2021-07-22 NOTE — DISCHARGE PLANNING
Received Transport Form @ 3373  Spoke to Rema @ [facility].    Transport is scheduled for 7/22 @7740 [facility].    BS RN and RN CM notified of scheduled transport via [employer] @7477.

## 2021-07-22 NOTE — DISCHARGE SUMMARY
Discharge Summary    CHIEF COMPLAINT ON ADMISSION  Chief Complaint   Patient presents with   • Shortness of Breath   • Cough       Reason for Admission  EMS 14     CODE STATUS  DNAR/DNI    HPI & HOSPITAL COURSE  75 y.o. male w/ hx of CAD w/ stent, HFrEF:30%, DM,ESRD on HD, HTN, Afib, PVD w/ bilateral BKA admitted 7/7/2021 with acute respiratory failure despite home 4L oxygen and was found to have a right sided pleural effusion and volume overload.  He had a thoracentesis on 7/8/21 w/ 2050ml of fluids removed. After his thoracentesis he became hypotensive and required pressor support in the ICU.  He was weaned off norepinephrine on 7/13/21 and was ultimately transferred to the telemetry floor with p.o. midodrine.  His carvedilol was switched to metoprolol.  Patient continue with routine dialysis as per renal MD. patient was evaluated by physical therapy and Occupational Therapy who are recommending to extension to skilled nursing facility prior to discharge home.  Stable patient with in chronic condition is to be transition to a skilled nursing facility for continuity of care.    Therefore, he is discharged in guarded and stable condition to skilled nursing facility.    The patient met 2-midnight criteria for an inpatient stay at the time of discharge.    FOLLOW UP ITEMS POST DISCHARGE  Skin nursing facility to establish hemodialysis  Skilled nursing facility to follow progress with physical therapy and elevation of therapy    DISCHARGE DIAGNOSES  Principal Problem:    Septic shock (HCC) POA: Yes  Active Problems:    Anemia POA: Yes    Acute on chronic respiratory failure (HCC) POA: Yes    End stage renal disease on dialysis (HCC) POA: Yes    Hypotension POA: Yes    Acute on chronic systolic congestive heart failure (HCC) POA: Yes    Ischemia of finger POA: Yes    Pleural effusion POA: Yes    CAD (coronary artery disease) POA: Yes      Overview: Stents to proximal and mid LAD in [facility].    Type 2 diabetes  mellitus with kidney complication, with long-term current use of insulin (HCC) POA: Yes    Paroxysmal atrial fibrillation (HCC) POA: Yes    Peripheral vascular disease (HCC) POA: Yes    Goals of care, counseling/discussion POA: Yes    DNR (do not resuscitate) POA: Yes    Status post below-knee amputation of both lower extremities (HCC) POA: Yes    BPH (benign prostatic hyperplasia) POA: Yes  Resolved Problems:    * No resolved hospital problems. *      FOLLOW UP  Future Appointments   Date Time Provider Department Center   8/11/2021  8:15 AM Tito Amin M.D. [facility] None     [place] M.D.  [address] [facility]  [address]  [facility] [address] [phone]      Please call and schedule a hospital follow up appointment with your primary care provider as needed. Thank you.    [facility] [address] [address] [phone]          MEDICATIONS ON DISCHARGE     Medication List      START taking these medications      Instructions   metoprolol SR 25 MG Tb24  Commonly known as: TOPROL XL   Take 1 tablet by mouth every day.  Dose: 25 mg        CHANGE how you take these medications      Instructions   acetaminophen 500 MG Tabs  What changed:   · medication strength  · how much to take  · when to take this  · additional instructions  Commonly known as: TYLENOL   Take 2 Tablets by mouth in the morning, at noon, and at bedtime.  Dose: 1,000 mg     gabapentin 300 MG Caps  What changed:   · when to take this  · reasons to take this  Commonly known as: NEURONTIN   Take 1 capsule by mouth 3 times a day as needed (Neuropathic pain).  Dose: 300 mg     * guaiFENesin 100 MG/5ML Soln  What changed:   · Another medication with the same name was added. Make sure you understand how and when to take each.  · Another medication with the same name was removed. Continue taking this medication, and follow the directions you see here.  Commonly known as: ROBITUSSIN   Take 10 mL by mouth every 6 hours as  needed for Cough.  Dose: 10 mL     * guaiFENesin  MG Tb12  What changed: You were already taking a medication with the same name, and this prescription was added. Make sure you understand how and when to take each.  Commonly known as: MUCINEX  Replaces: Guaifenesin 400 MG Tabs   Take 1 tablet by mouth 2 times a day.  Dose: 600 mg     midodrine 5 MG Tabs  What changed:   · medication strength  · how much to take  Commonly known as: PROAMATINE   Take 3 Tablets by mouth 3 times a day with meals.  Dose: 15 mg         * This list has 2 medication(s) that are the same as other medications prescribed for you. Read the directions carefully, and ask your doctor or other care provider to review them with you.            CONTINUE taking these medications      Instructions   apixaban 5mg Tabs  Commonly known as: ELIQUIS   Take 1 tablet by mouth 2 times a day. Indications: Thromboembolism secondary to Atrial Fibrillation  Dose: 5 mg     atorvastatin 40 MG Tabs  Commonly known as: LIPITOR   Take 1 tablet by mouth at bedtime.  Dose: 40 mg     budesonide 0.5 MG/2ML Susp  Commonly known as: PULMICORT   Take 500 mcg by nebulization 2 times a day. DISCONTINUED.  Dose: 500 mcg     calcitRIOL 0.25 MCG Caps  Commonly known as: ROCALTROL   Take 1 capsule by mouth every day.  Dose: 0.25 mcg     clopidogrel 75 MG Tabs  Commonly known as: PLAVIX   Take 1 tablet by mouth every day.  Dose: 75 mg     furosemide 40 MG Tabs  Commonly known as: LASIX   Take 40 mg by mouth see administration instructions. Take 1 tablet (40 mg) by mouth once per day on Sundays, Mondays, Wednesdays, and Fridays only (non-dialysis days).  Dose: 40 mg     ipratropium-albuterol 0.5-2.5 (3) MG/3ML nebulizer solution  Commonly known as: DUONEB   Take 3 mL by nebulization 2 times a day.  Dose: 3 mL     Lantus SoloStar 100 UNIT/ML Sopn injection  Generic drug: insulin glargine   Inject 10 Units under the skin at bedtime.  Dose: 10 Units     levoFLOXacin 250 MG  "Tabs  Commonly known as: LEVAQUIN   Take 250 mg by mouth at bedtime. 7 day course started 6/10/2021. FINISHED.  Dose: 250 mg     methimazole 5 MG Tabs  Commonly known as: TAPAZOLE   Take 1 tablet by mouth 2 times a day.  Dose: 5 mg     omeprazole 20 MG delayed-release capsule  Commonly known as: PRILOSEC   Take 1 capsule by mouth 2 times a day.  Dose: 20 mg     sevelamer carbonate 800 MG Tabs tablet  Commonly known as: RENVELA   Take 2 Tablets by mouth 3 times a day with meals.  Dose: 1,600 mg     tamsulosin 0.4 MG capsule  Commonly known as: FLOMAX   Take 2 Capsules by mouth at bedtime.  Dose: 0.8 mg     traZODone 50 MG Tabs  Commonly known as: DESYREL   Take 50 mg by mouth at bedtime.  Dose: 50 mg        STOP taking these medications    carvedilol 3.125 MG Tabs  Commonly known as: COREG     Guaifenesin 400 MG Tabs  Replaced by: guaiFENesin  MG Tb12  You also have another medication with the same name that you need to continue taking as instructed.     oxyCODONE-acetaminophen 5-325 MG Tabs  Commonly known as: PERCOCET     traMADol 50 MG Tabs  Commonly known as: ULTRAM            Allergies  Allergies   Allergen Reactions   • Mircera [Methoxy Polyethylene Glycol-Epoetin Beta] Hives   • Other Drug Unspecified     \"Opioids\" caused hallucinations       DIET  Orders Placed This Encounter   Procedures   • Diet Order Diet: Level 3 - Liquidised (Crush meds in puree please ); Liquid level: Level 2 - Mildly Thick     Standing Status:   Standing     Number of Occurrences:   1     Order Specific Question:   Diet:     Answer:   Level 3 - Liquidised [26]     Comments:   Crush meds in puree please      Order Specific Question:   Liquid level     Answer:   Level 2 - Mildly Thick       ACTIVITY  As tolerated.  Weight bearing as tolerated    LINES, DRAINS, AND WOUNDS  This is an automated list. Peripheral IVs will be removed prior to discharge.  CVC Triple Lumen 07/09/21 Lumen 1: Blue Lumen 2: Brown Lumen 3: White Left Internal " jugular (Active)   Consider Removal of Femoral Line Not Applicable 07/14/21 0800   Site Assessment Clean;Dry;Intact 07/21/21 1915   Lumen 1 Status Scrubbed the hub prior to access;Flushed;Blood return noted;Normal saline locked 07/21/21 1915   Lumen 3 Status Scrubbed the hub prior to access;Flushed;Blood return noted;Normal saline locked 07/21/21 1915   Lumen 2 Status Scrubbed the hub prior to access;Flushed;Normal saline locked;Blood return noted 07/21/21 1915   Dressing Type Biopatch;Occlusive 07/21/21 1915   Dressing Status Clean;Dry;Intact 07/21/21 1915   Dressing Intervention N/A 07/21/21 1915   Dressing Change Due 07/24/21 07/19/21 0815   Date Primary Tubing Changed 07/13/21 07/14/21 0800   Date Secondary Tubing Changed 07/13/21 07/14/21 0800   Date IV Connector(s) Changed 07/17/21 07/13/21 0800   NEXT Primary Tubing Change  07/17/21 07/14/21 0800   NEXT Secondary Tubing Change  07/17/21 07/14/21 0800   NEXT IV Connector(s) Change 07/17/21 07/14/21 0800   Waveform Not Applicable 07/14/21 1600   Line Calibrated Not Applicable 07/14/21 1600   Line Necessity Assessed Lack of Peripheral Access 07/17/21 0300   Line Necessity Reviewed With Dr. Cheung 07/14/21 0800       Wound 05/20/21 Soft Tissue Necrosis Foot;Toe, 2nd;Toe, 3rd (Active)       Wound 05/28/21 Incision Leg Left adaptic, 4x4s, webril, ace (Active)   Wound Image     07/11/21 1800   Site Assessment Clean;Dry;Red 07/21/21 1915   Periwound Assessment Dry;Intact;Brown;Pale 07/21/21 1915   Margins Defined edges 07/21/21 1915   Closure Open to air 07/21/21 1915   Drainage Amount None 07/21/21 1915   Drainage Description EMELIA 07/16/21 1000   Treatments Offloading 07/21/21 1915   Wound Cleansing Soap and Water 07/21/21 0815   Periwound Protectant Not Applicable 07/21/21 0815   Dressing Cleansing/Solutions Not Applicable 07/21/21 0815   Dressing Options Open to Air 07/21/21 1915   Dressing Changed Observed 07/18/21 2000   Dressing Status Open to Air 07/21/21 1915    Dressing Change/Treatment Frequency As Needed 07/18/21 0914   NEXT Dressing Change/Treatment Date 07/17/21 07/16/21 2100   NEXT Weekly Photo (Inpatient Only) 07/17/21 07/16/21 2100       Wound 06/19/21 Finger, 2nd;Finger, 3rd;Finger, 4th Left (Active)   Wound Image    07/11/21 1800   Site Assessment Black;Eschar 07/21/21 1915   Periwound Assessment Black;Cold;Dark edges 07/21/21 1915   Margins Defined edges 07/21/21 1915   Closure Open to air 07/21/21 1915   Drainage Amount None 07/21/21 1915   Drainage Description EMELIA 07/18/21 0914   Treatments Cleansed 07/20/21 1915   Wound Cleansing Approved Wound Cleanser 07/21/21 0815   Periwound Protectant Skin Protectant Wipes to Periwound 07/20/21 0815   Dressing Cleansing/Solutions 3% Betadine 07/21/21 0815   Dressing Options Open to Air 07/20/21 1915   Dressing Changed Observed 07/18/21 0914   Dressing Status Open to Air 07/21/21 1915   Dressing Change/Treatment Frequency Daily, and As Needed 07/18/21 0914   NEXT Dressing Change/Treatment Date 07/17/21 07/16/21 2100   NEXT Weekly Photo (Inpatient Only) 07/17/21 07/16/21 2100   Shape irregular 07/11/21 1800   Wound Odor None 07/11/21 1800   Pulses Radial;Thready 07/11/21 1800   Exposed Structures EMELIA;None 07/11/21 1800   WOUND NURSE ONLY - Time Spent with Patient (mins) 45 07/11/21 1800       Wound 07/21/21 Abrasion Nose Inner (Active)   Site Assessment Red;Bleeding 07/21/21 1915   Periwound Assessment Red;Bleeding 07/21/21 1915   Margins Unattached edges 07/21/21 1915   Closure Open to air 07/21/21 1915     CVC Triple Lumen 07/09/21 Lumen 1: Blue Lumen 2: Brown Lumen 3: White Left Internal jugular (Active)   Consider Removal of Femoral Line Not Applicable 07/14/21 0800   Site Assessment Clean;Dry;Intact 07/21/21 1915   Lumen 1 Status Scrubbed the hub prior to access;Flushed;Blood return noted;Normal saline locked 07/21/21 1915   Lumen 3 Status Scrubbed the hub prior to access;Flushed;Blood return noted;Normal saline  locked 07/21/21 1915   Lumen 2 Status Scrubbed the hub prior to access;Flushed;Normal saline locked;Blood return noted 07/21/21 1915   Dressing Type Biopatch;Occlusive 07/21/21 1915   Dressing Status Clean;Dry;Intact 07/21/21 1915   Dressing Intervention N/A 07/21/21 1915   Dressing Change Due 07/24/21 07/19/21 0815   Date Primary Tubing Changed 07/13/21 07/14/21 0800   Date Secondary Tubing Changed 07/13/21 07/14/21 0800   Date IV Connector(s) Changed 07/17/21 07/13/21 0800   NEXT Primary Tubing Change  07/17/21 07/14/21 0800   NEXT Secondary Tubing Change  07/17/21 07/14/21 0800   NEXT IV Connector(s) Change 07/17/21 07/14/21 0800   Waveform Not Applicable 07/14/21 1600   Line Calibrated Not Applicable 07/14/21 1600   Line Necessity Assessed Lack of Peripheral Access 07/17/21 0300   Line Necessity Reviewed With Dr. Cheung 07/14/21 0800       HD Catheter Double Lumen Tunneled (Active)   Site Assessment Clean;Dry;Intact 07/21/21 1915   Red Lumen Status Capped 07/21/21 1915   Blue Lumen Status Capped 07/21/21 1915   Site Prep Alcohol 07/20/21 1451   Treatment Performed Dialysis 07/21/21 1915   Line Care Cap replaced;Connections replaced and tightened 07/20/21 1451   Dressing Type Biopatch;Transparent 07/21/21 1915   Dressing Status Clean;Dry;Intact 07/21/21 1915   Dressing Intervention N/A 07/21/21 1915   Dressing Change Due 07/24/21 07/21/21 1915      Hemodialysis AV Graft/Fistula 04/21/20 Left AV fistula Upper arm (Active)   AV Fistula Status Bruit present 07/21/21 1915   Site Assessment Clean;Dry;Intact 07/21/21 1915   Status Deaccessed 07/21/21 1915   Local Anesthetic None 06/22/21 1349   Site Prep Alcohol 06/22/21 1349   Needle Size 15 G 06/22/21 1349   Dressing Type Open to Air 07/21/21 1915   Dressing Status Clean;Dry;Intact 07/21/21 1915   Dressing Intervention N/A 07/21/21 1915               MENTAL STATUS ON TRANSFER  [id number]      CONSULTATIONS  Palliative care medicine  Critical care medicine  Nephrology  Limb  preservation    PROCEDURES  7/9/2021 central line placement  7/8/2021 thoracentesis    LABORATORY  Lab Results   Component Value Date    SODIUM 132 (L) 07/22/2021    POTASSIUM 5.2 07/22/2021    CHLORIDE 96 07/22/2021    CO2 28 07/22/2021    GLUCOSE 95 07/22/2021    BUN 30 (H) 07/22/2021    CREATININE 5.56 (HH) 07/22/2021        Lab Results   Component Value Date    WBC 6.9 07/21/2021    HEMOGLOBIN 8.6 (L) 07/21/2021    HEMATOCRIT 28.3 (L) 07/21/2021    PLATELETCT 295 07/21/2021        Total time of the discharge process exceeds 35 minutes.

## 2021-07-22 NOTE — CARE PLAN
The patient is Stable - Low risk of patient condition declining or worsening    Shift Goals  Clinical Goals: stable hemodynamics  Patient Goals: Rest  Family Goals: safety      Problem: Care Map:  Admission Optimal Outcome for the Heart Failure Patient  Goal: Admission:  Optimal Care of the heart failure patient  Outcome: Met     Problem: Care Map:  Day 1 Optimal Outcome for the Heart Failure Patient  Goal: Day 1:  Optimal Care of the heart failure patient  Outcome: Met     Problem: Care Map:  Day 2 Optimal Outcome for the Heart Failure Patient  Goal: Day 2:  Optimal Care of the heart failure patient  Outcome: Met     Problem: Care Map:  Day 3 Optimal Outcome for the Heart Failure Patient  Goal: Day 3:  Optimal Care of the heart failure patient  Outcome: Met     Problem: Care Map:  Day Before Discharge Optimal Outcome for the Heart Failure Patient  Goal: Day Before Discharge:  Optimal Care of the heart failure patient  Outcome: Met     Problem: Care Map:  Day of Discharge Optimal Outcome for the Heart Failure Patient  Goal: Day of Discharge:  Optimal Care of the heart failure patient  Outcome: Met     Problem: Skin Integrity  Goal: Skin integrity is maintained or improved  Outcome: Met     Problem: Pain - Standard  Goal: Alleviation of pain or a reduction in pain to the patient’s comfort goal  Outcome: Met     Problem: Fall Risk  Goal: Patient will remain free from falls  Outcome: Met

## 2021-07-22 NOTE — DISCHARGE PLANNING
Agency/Facility Name: [facility]  Spoke To: Alanna  Outcome: Left message confirming 1600 [facility] transport to facility.    RN CM informed

## 2021-07-22 NOTE — PROGRESS NOTES
Received report from night shift RNTamiko. Assumed care of pt @ 1900. Pt reports 10/10 pain at this time. Updated pt on plan of care. Pt resting comfortably in bed. Fall precautions in place. Educated on use of call light. Hourly rounding and continuous monitoring in place.

## 2021-07-22 NOTE — CARE PLAN
The patient is Stable - Low risk of patient condition declining or worsening    Shift Goals  Clinical Goals: stable hemodynamics  Patient Goals: Rest  Family Goals: safety    Progress made toward(s) clinical / shift goals:  Pt whiteboard updated on plan of care. Pt encouraged to call for assistance. Pt encouraged to voice any concerns or questions regarding care plan. Pt updated on plan of care as it develops and changes. Fall precautions in place. Bed in lowest position. Non-skid socks in place. Personal possessions within reach. Mobility sign on door. Bed-alarm on. Call light within reach. Pt educated regarding fall prevention and states understanding. Pt will verbalize pain using 0-10 pain scale, when to ask for pain medication, and medication information.       Problem: Skin Integrity  Goal: Skin integrity is maintained or improved  Outcome: Progressing     Problem: Pain - Standard  Goal: Alleviation of pain or a reduction in pain to the patient’s comfort goal  Outcome: Progressing     Problem: Fall Risk  Goal: Patient will remain free from falls  Outcome: Progressing

## 2021-07-22 NOTE — PROGRESS NOTES
[facility] Services Progress Note      HD today x 3 hours per Dr. Hodges . Initiated at 0730 and ended at 1030.   Patient alert and oriented x4. Denies SOB, no pain. BP 80's systolic. Patient denies lightheadedness or dizziness.  Patient able to finished tx but UF goal limited d/t hypotension.   Breakfast tray with patient. Patient unable to eat in dialysis due to low BP     UF Net: 1000 mL    See paper flowsheet for details.      L IJ intact and patent with good flow during dialysis. No s/sx of infection, no redness nor bleeding noted on the CVC site. CVC dressing clean, dry and intact.   Blood returned and CVC port flushed with NS and Heparin 1000 units/mL used  to lock catheter given per designated amount. CVC port clamped, capped and labeled accordingly.       Report given to Yris Patterson RN.

## 2021-07-22 NOTE — DISCHARGE PLANNING
Anticipated Discharge Disposition:  [facility]    Action: Pt accepted to [facility], pt amenable and gave auth to transfer today at 1600, transport forms and approved service auth's by Edelmira Donohue Supervisor, faxed to [employer]. JAN Donnelly and Dr. Tolentino notified. Transfer packet completed and given to JAN donnelly.    Barriers to Discharge: none    Plan: Transfer to [facility] at 1600 by UNIQUE.

## 2021-07-22 NOTE — PROGRESS NOTES
4 Eyes Skin Assessment Completed by JAN Odonnell and JAN Hodgson.     Head WDL  Ears Redness and Blanching  Nose WDL  Mouth WDL  Neck WDL  Breast/Chest WDL  Shoulder Blades WDL  Spine WDL  (R) Arm/Elbow/Hand Redness, Blanching, Bruising, Abrasion and Scab  (L) Arm/Elbow/Hand Redness, Blanching, Bruising, Abrasion and Scab, L mid three finger gangrene  Abdomen Redness  Groin Redness  Scrotum/Coccyx/Buttocks Redness, Blanching and Excoriation, slow to bell  (R) Leg Redness, Blanching, surgical wound covered with eschar, BKA  (L) Leg Redness and Blanching, BKA        Devices In Places Nasal Cannula        Interventions In Place Gray Ear Foams, Waffle Overlay, Pillows, Q2 Turns, Barrier Cream, Heels Loaded W/Pillows and Pressure Redistribution Mattress     Possible Skin Injury No     Pictures Uploaded Into Epic N/A  Wound Consult Placed N/A  RN Wound Prevention Protocol Ordered No

## 2021-07-22 NOTE — DISCHARGE INSTRUCTIONS
Discharge Instructions    Discharged to other by ambulance with escort. Discharged via ambulance, hospital escort: Yes.  Special equipment needed: Not Applicable    Be sure to schedule a follow-up appointment with your primary care doctor or any specialists as instructed.     Discharge Plan:   Diet Plan: Discussed  Activity Level: Discussed  Confirmed Follow up Appointment: Appointment Scheduled  Confirmed Symptoms Management: Discussed  Medication Reconciliation Updated: Yes    I understand that a diet low in cholesterol, fat, and sodium is recommended for good health. Unless I have been given specific instructions below for another diet, I accept this instruction as my diet prescription.   Other diet: Mildy thick diet    Special Instructions:   HF Patient Discharge Instructions  · Monitor your weight daily, and maintain a weight chart, to track your weight changes.   · Activity as tolerated, unless your Doctor has ordered otherwise. Other activity order: As tolerated.  · Follow a low fat, low cholesterol, low salt diet unless instructed otherwise by your Doctor. Read the labels on the back of food products and track your intake of fat, cholesterol and salt.   · Fluid Restriction No. If a Fluid Restriction has been ordered by your Doctor, measure fluids with a measuring cup to ensure that you are not exceeding the restriction.   · No smoking.  · Oxygen Yes. If your Doctor has ordered that you wear Oxygen at home, it is important to wear it as ordered.  · Did you receive an explanation from staff on the importance of taking each of your medications and why it is necessary to keep taking them unless your doctor says to stop? Yes  · Were all of your questions answered about how to manage your heart failure and what to do if you have increased signs and symptoms after you go home? Yes  · Do you feel like your heart failure care team involved you in the care treatment plan and allowed you to make decisions regarding  your care while in the hospital and addressed any discharge needs you might have? Yes    See the educational handout provided at discharge for more information on monitoring your daily weight, activity and diet. This also explains more about Heart Failure, symptoms of a flare-up and some of the tests that you have undergone.     Warning Signs of a Flare-Up include:  · Swelling in the ankles or lower legs.  · Shortness of breath, while at rest, or while doing normal activities.   · Shortness of breath at night when in bed, or coughing in bed.   · Requiring more pillows to sleep at night, or needing to sit up at night to sleep.  · Feeling weak, dizzy or fatigued.     When to call your Doctor:  · Call [facility] seven days a week from 8:00 a.m. to 8:00 p.m. for medical questions [phone].  · Call your Primary Care Physician or Cardiologist if:   1. You experience any pain radiating to your jaw or neck.  2. You have any difficulty breathing.  3. You experience weight gain of 3 lbs in a day or 5 lbs in a week.   4. You feel any palpitations or irregular heartbeats.  5. You become dizzy or lose consciousness.   If you have had an angiogram or had a pacemaker or AICD placed, and experience:  1. Bleeding, drainage or swelling at the surgical / puncture site.  2. Fever greater than 100.0 F  3. Shock from internal defibrillator.  4. Cool and / or numb extremities.      · Is patient discharged on Warfarin / Coumadin?   No     Depression / Suicide Risk    As you are discharged from this [facility], it is important to learn how to keep safe from harming yourself.    Recognize the warning signs:  · Abrupt changes in personality, positive or negative- including increase in energy   · Giving away possessions  · Change in eating patterns- significant weight changes-  positive or negative  · Change in sleeping patterns- unable to sleep or sleeping all the time   · Unwillingness or inability to  communicate  · Depression  · Unusual sadness, discouragement and loneliness  · Talk of wanting to die  · Neglect of personal appearance   · Rebelliousness- reckless behavior  · Withdrawal from people/activities they love  · Confusion- inability to concentrate     If you or a loved one observes any of these behaviors or has concerns about self-harm, here's what you can do:  · Talk about it- your feelings and reasons for harming yourself  · Remove any means that you might use to hurt yourself (examples: pills, rope, extension cords, firearm)  · Get professional help from the community (Mental Health, Substance Abuse, psychological counseling)  · Do not be alone:Call your Safe Contact- someone whom you trust who will be there for you.  · Call your local CRISIS HOTLINE 844-8056 or 545-381-5916  · Call your local Children's Mobile Crisis Response Team [place] (702) 798-0222 or www.[place]  · Call the toll free National Suicide Prevention Hotlines   · National Suicide Prevention Lifeline 867-309-DCSL (1488)  · National Hope Line Network 800-SUICIDE (181-0697)

## 2021-07-23 PROBLEM — J96.11 CHRONIC RESPIRATORY FAILURE WITH HYPOXIA (HCC): Status: ACTIVE | Noted: 2021-01-01

## 2021-07-23 NOTE — WOUND TEAM
In to see patient for wound consult of left nare wound.  Patient was wearing a nasal cannula has since switched to oxygen mask to allow area to be offloaded and dry.  Area with shallow crust over top, unlikely a nasal cannula would have caused pressure wound.  Likely related to moisture and patient scratching.  Ok to leave [facility]JI.  No advanced wound care needs.  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

## 2021-07-28 NOTE — ED NOTES
Admitting MD at bedside.  
Bedside report given to Luther MONTOYA.  
Covid swab sent.  
ERP at bedside.  
ERP aware of critical lab values, troponin 657 and creatinine 6.41  
Floor notified pt ready for transport.  
Lab called for add ons.  
Med rec updated and complete. Allergies reviewed. Met with pt at bedside. Pt denies antibiotic use in last 14 days.   No medications taken 05/10/21.      Local [place] pharmacy [facility] [phone]      Long term pharmacy [facility] [phone]  
PIV established, labs drawn and sent.  
Pt roomed, placed on monitor.  
Seen by cardiology.  
Detail Level: Simple
Size Of Lesion: 2.1 cm
Instructions (Optional): She thinks she had an ultrasound done on this in the past. She is unsure what she was told it is. She should call her PCP to find out. I recommended she schedule DS
X Size Of Lesion In Cm (Optional): 0
Detail Level: Detailed

## 2021-08-02 PROBLEM — E87.1 HYPONATREMIA: Status: RESOLVED | Noted: 2018-06-25 | Resolved: 2021-01-01

## 2021-08-02 PROBLEM — E87.70 VOLUME OVERLOAD: Status: ACTIVE | Noted: 2021-01-01

## 2021-08-02 NOTE — ED TRIAGE NOTES
"Chief Complaint   Patient presents with   • Shortness of Breath     Pt presents to the ED from [facility] for increasing SOB. Pt reports he receives dialysis every Tues, Thurs, Sun but still feels as though he has excess fluid in his lungs. Pt reports he is seen in the ED occassionaly for removal of fluid from lungs.      Pt is also receiving treatment for nacrotic fingers on left hand; Pt states \"I am trying to save them\".   "

## 2021-08-02 NOTE — H&P
History & Physical Note    Date of Admission: 8/2/2021  Admission Status: Observation-Outpatient  [facility] Team: [facility] Team  Attending: Valdez White M.D.   Senior Resident: Dr. Finn   Intern: Dr. Navarro  Contact Number: [phone]    Chief Complaint: Shortness of breath    History of Present Illness (HPI):   Luis is a 75 y.o. male who presented 8/2/2021 with past medical history of COPD (no inhalers at home), ZULMA, ESRD on HD, DM 2, CAD with stent ([facility] approximately 10 years ago; on clopidogrel, apixaban), HFrEF 30%, comes to the ED due to shortness of breath for the last 2 days.  Remarks no precipitating events.  Patient remarks having phlegm that is difficult to get rid of; remarks that when he does it is thick, dark gray, no weird smell.  Patient remarks compliance with dialysis (Tuesday, Thursday, Saturday).  Patient has a history of 5 admissions this year: Last day was a week ago in which patient was admitted for fluid overload; 2 L of fluid removed via thoracocentesis.  Patient remarks not using oxygen at home until his last admission; currently on 4.5 L saturating 93%.  Patient denies headache, nausea, vomiting, dizziness, chest pain, palpitations, diaphoresis, abdominal pain, weakness.    Review of Systems:   Review of Systems   Constitutional: Positive for malaise/fatigue. Negative for chills, diaphoresis, fever and weight loss.   HENT: Negative for sore throat.    Eyes: Negative for blurred vision and double vision.   Respiratory: Positive for cough, sputum production and shortness of breath.    Cardiovascular: Negative for chest pain, palpitations, orthopnea and leg swelling.   Gastrointestinal: Negative for abdominal pain, constipation, diarrhea, heartburn, nausea and vomiting.   Genitourinary: Negative for dysuria.   Musculoskeletal: Negative for myalgias.   Skin: Negative for rash.   Neurological: Positive for weakness. Negative for dizziness, tingling, tremors, loss of  consciousness and headaches.     Past Medical History:   Past Medical History was reviewed with patient.   has a past medical history of Arrhythmia, Arthritis (12/29/2020), CAD (coronary artery disease), Chronic anticoagulation (3/26/2020), Chronic kidney disease (CKD), stage V ([facility]), Congestive heart failure ([facility]), Dental disorder (12/29/2020), Diabetes, Hemodialysis patient ([facility]), Hypertension, Hypotension (11/28/2019), Kidney failure, Myocardial infarct ([facility]), Osteoarthritis, Peripheral neuropathy, Pneumonia, and Sleep apnea.    Past Surgical History: Past Surgical History was reviewed with patient.   has a past surgical history that includes appendectomy; other orthopedic surgery; other cardiac surgery; toe amputation (Left, 5/17/2020); tenotomy (Left, 5/17/2020); toe amputation (Right, 11/16/2020); knee amputation below (Right, 12/31/2020); knee amputation below (Left, 5/28/2021); av fistula revision (Left, 6/23/2021); vein ligation (Left, 6/23/2021); and cath placement (Left, 6/23/2021).    Medications: Medications have been reviewed with patient.  Prior to Admission Medications   Prescriptions Last Dose Informant Patient Reported? Taking?   acetaminophen (TYLENOL) 500 MG Tab 8/2/2021 at 0825  No No   Sig: Take 2 Tablets by mouth in the morning, at noon, and at bedtime.   apixaban (ELIQUIS) 5mg Tab 8/2/2021 at 0825 MAR from Other Facility No No   Sig: Take 1 tablet by mouth 2 times a day. Indications: Thromboembolism secondary to Atrial Fibrillation   atorvastatin (LIPITOR) 40 MG Tab 8/1/2021 at 2316 MAR from Other Facility No No   Sig: Take 1 tablet by mouth at bedtime.   calcitRIOL (ROCALTROL) 0.25 MCG Cap 8/2/2021 at 0825 MAR from Other Facility No No   Sig: Take 1 capsule by mouth every day.   clopidogrel (PLAVIX) 75 MG Tab 8/2/2021 at 0825 MAR from Other Facility No No   Sig: Take 1 tablet by mouth every day.   furosemide (LASIX) 40 MG Tab 8/2/2021 at 0825 MAR from Other Facility Yes No   Sig: Take 40 mg  "by mouth see administration instructions. Take 1 tablet (40 mg) by mouth once per day on Sundays, Mondays, Wednesdays, and Fridays only (non-dialysis days).   gabapentin (NEURONTIN) 300 MG Cap 8/2/2021 at 0825  No No   Sig: Take 1 capsule by mouth 3 times a day as needed (Neuropathic pain).   guaiFENesin ER (MUCINEX) 600 MG TABLET SR 12 HR 8/2/2021 at 0825  No No   Sig: Take 1 tablet by mouth 2 times a day.   insulin glargine (LANTUS SOLOSTAR) 100 UNIT/ML Solution Pen-injector injection 8/1/2021 at 2111 MAR from Other Facility Yes No   Sig: Inject 10 Units under the skin at bedtime.   ipratropium-albuterol (DUONEB) 0.5-2.5 (3) MG/3ML nebulizer solution 8/2/2021 at 0904 MAR from Other Facility Yes No   Sig: Take 3 mL by nebulization 2 times a day.   methimazole (TAPAZOLE) 5 MG Tab 8/2/2021 at 0825 MAR from Other Facility No No   Sig: Take 1 tablet by mouth 2 times a day.   metoprolol SR (TOPROL XL) 25 MG TABLET SR 24 HR 7/30/2021 at 1010  No No   Sig: Take 1 tablet by mouth every day.   midodrine (PROAMATINE) 5 MG Tab 8/2/2021 at 0825  No No   Sig: Take 3 Tablets by mouth 3 times a day with meals.   omeprazole (PRILOSEC) 20 MG delayed-release capsule 8/2/2021 at 0825 MAR from Other Facility No No   Sig: Take 1 capsule by mouth 2 times a day.   sevelamer carbonate (RENVELA) 800 MG Tab tablet 8/2/2021 at 0825 MAR from Other Facility No No   Sig: Take 2 Tablets by mouth 3 times a day with meals.   tamsulosin (FLOMAX) 0.4 MG capsule 8/1/2021 at 2316 MAR from Other Facility No No   Sig: Take 2 Capsules by mouth at bedtime.   traZODone (DESYREL) 50 MG Tab 8/1/2021 at 2316 MAR from Other Facility Yes No   Sig: Take 50 mg by mouth at bedtime.      Facility-Administered Medications: None        Allergies: Allergies have been reviewed with patient.  Allergies   Allergen Reactions   • Mircera [Methoxy Polyethylene Glycol-Epoetin Beta] Hives   • Other Drug Unspecified     \"Opioids\" caused hallucinations       Family History: " Denies any pertinent family history  at this time  family history includes Alcohol/Drug in his father; Diabetes in his brother; Heart Disease in his mother; Thyroid in his son.     Social History:   Tobacco: History of smoking with cessation 17 years ago.  Alcohol: Has any recent alcohol use.  Recreational drugs (illegal and prescription): As any recent recreational drug use  Activity Level: Reports reduced activity levels since undergoing bilateral BKA's.  Living situation: Currently living with his wife  Primary Care Provider: lu Ac M.D.  Vitals:  Temp:  [36.2 °C (97.1 °F)-36.6 °C (97.9 °F)] 36.2 °C (97.1 °F)  Pulse:  [] 109  Resp:  [15-20] 20  BP: ()/() 142/115  SpO2:  [92 %-100 %] 93 %    Physical Exam  Constitutional:       General: He is not in acute distress.     Appearance: Normal appearance. He is not ill-appearing, toxic-appearing or diaphoretic.   HENT:      Head: Normocephalic and atraumatic.      Mouth/Throat:      Mouth: Mucous membranes are moist.      Pharynx: Oropharynx is clear. No oropharyngeal exudate or posterior oropharyngeal erythema.   Eyes:      General: No scleral icterus.        Right eye: No discharge.         Left eye: No discharge.   Cardiovascular:      Rate and Rhythm: Tachycardia present. Rhythm irregular.      Heart sounds: No murmur heard.   No gallop.    Pulmonary:      Effort: Pulmonary effort is normal.      Breath sounds: Rhonchi present. No wheezing or rales.   Abdominal:      General: Abdomen is flat. There is no distension.      Palpations: Abdomen is soft. There is no mass.      Tenderness: There is no abdominal tenderness. There is no guarding or rebound.   Musculoskeletal:         General: No swelling or tenderness.      Right lower leg: No edema.      Left lower leg: No edema.   Skin:     Coloration: Skin is not jaundiced.      Findings: No erythema or rash.   Neurological:      Mental Status: He is alert and oriented to person,  place, and time. Mental status is at baseline.   Psychiatric:         Mood and Affect: Mood normal.         Behavior: Behavior normal.         Thought Content: Thought content normal.         Labs:   Please see result section for further information.    EKG:   Results for orders placed or performed during the hospital encounter of 21   EKG   Result Value Ref Range    Report       [facility] Emergency Dept.    Test Date:  2021  Pt Name:    LUIS BRADSHAW              Department: ER  MRN:        [id number]                      Room:       [facility]  Gender:     Male                         Technician: [id number]  :        1945                   Requested By:ER TRIAGE PROTOCOL  Order #:    [id number]                    Reading MD: GIANNI BARBA. AMD    Measurements  Intervals                                Axis  Rate:       97                           P:          62  MD:         188                          QRS:        -79  QRSD:       139                          T:          139  QT:         359  QTc:        456    Interpretive Statements  Sinus rhythm  Ventricular bigeminy  RBBB and LAFB  Abnormal T, consider ischemia, lateral leads  Compared to ECG 2021 23:47:07  Ventricular premature complex(es) now present  T-wave abnormality now present  No significant change from prior  Electronically Signed On 2021 14:23:43 PDT by GIANNI BARBA. AMD         Imaging:   DX-CHEST-PORTABLE (1 VIEW)   Final Result      1.  Stable cardiomegaly.      2.  Worsening left basilar opacification is concerning for developing pneumonia.      3.  No significant change in right pleural effusion with adjacent airspace opacification.      4.  Interval increase in interstitial edema          Previous Data Review: reviewed    Problem Representation: Luis is a 75 y.o. male who presented 2021 with past medical history of COPD (no inhalers at home), ZULMA, ESRD on HD, DM 2, CAD with stent ([place] approximately 10 years ago; on clopidogrel, apixaban), HFrEF 30%, comes to the ED due to shortness of breath for the last 2 days.  Admitted for further evaluation of underlying cause of shortness of breath.  Nephrology consulted will evaluate patient tomorrow for hemodialysis.  We will hold off on diuretics at this time due to soft blood pressure.    * Acute respiratory failure with hypoxia (HCC)- (present on admission)  Assessment & Plan  History of COPD and ESRD on hemodialysis with no home oxygen use presenting with worsening shortness of breath over past 2 days currently requiring 4.5 L in the emergency department.    -Test x-ray shows recurrent right-sided pleural effusion, which the patient is been admitted for multiple times in the past.  -Hold off on patient's at this time due to soft blood pressure.  Nephrology outpatient tomorrow for hemodialysis.  -ADA for evaluation of TB, troponin, NT proBNP, and ABG for further evaluation.  -Possibly due to worsening COPD exacerbation.  Will treat for COPD exacerbation with 5 days of steroids, scheduled duo nebs, Symbicort, and doxycycline.  -For further infection with ceftriaxone.    Pleural effusion- (present on admission)  Assessment & Plan  And has a history of approximately 5 admissions this year for pleural effusions; thoracocentesis is therapeutic but last time caused the patient to go into shock; was transferred to ICU and treated with pressors where he was stabilized.    -Chest x-ray reveals stable cardiomegaly, worsening left basilar opacification (pneumonia?),  No change in right pleural effusion with adjacent airspace opacification, interval increase in interstitial edema.   -Holding fluids for now.  -Holding Lasix for now    Acute on chronic systolic congestive heart failure (HCC)- (present on admission)  Assessment & Plan  Has a history of HFrEF 30%    -Pontine: 190; will continue to trend  -BNP: Greater than 35,000  -Holding Lasix at this time due  to soft blood pressure  -Monitoring vitals  -Monitoring for signs of fluid overload    End stage renal disease on dialysis (HCC)- (present on admission)  Assessment & Plan  History of end-stage renal disease currently on hemodialysis with port placement after AV fistula failed.    -Reports compliance with his hemodialysis Tuesday, Thursday, and Saturday schedule.  -On Calcitrol  -No indication for urgent dialysis at this time.  Consulted with nephrology who will follow patient in the morning for continued dialysis.    CAD (coronary artery disease)- (present on admission)  Assessment & Plan  History of CAD with stent at [facility] around 10 years ago    -On midodrine 5, metoprolol 25, atorvastatin 40  -Will hold metoprolol due to soft blood pressure; monitor vitals  -Continue midodrine, atorvastatin  -Will stop Eliquis; replaced with heparin for potential dialysis tomorrow    BPH (benign prostatic hyperplasia)- (present on admission)  Assessment & Plan  On examination patient remarked on times feel like he has to urinate but does not.    -On tamsulosin 0.4 mg; continue  -Ordered bladder scan to evaluate for urinary retention; follow-up    Status post below-knee amputation of both lower extremities (HCC)- (present on admission)  Assessment & Plan  Bilateral neuropathy  -On gabapentin, acetaminophen; continue    Type 2 diabetes mellitus with kidney complication, with long-term current use of insulin ([facility])- (present on admission)  Assessment & Plan  On Lantus 10 units at home    -Glucose: 122  -We will start Lantus 5 units here; monitor glucose    Gastroesophageal reflux disease- (present on admission)  Assessment & Plan  Controlled on omeprazole 20 mg; continue    Hyperthyroidism- (present on admission)  Assessment & Plan  On methimazole 5 mg; continue

## 2021-08-02 NOTE — DISCHARGE PLANNING
Outpatient Dialysis Note    Confirmed patient is active at:    [facility]   [address]  [place] [address]    Schedule: Tuesday, Thursday, Saturday   Time: 06:00am    Patient is seen by Dr. Anthony  in HD clinic.    Spoke with Clayton at facility who confirmed.    Forwarded records for review.    Becca Bojorquez- Dialysis Coordinator Ph# [phone]  [facility]

## 2021-08-02 NOTE — ED PROVIDER NOTES
ED Provider Note    CHIEF COMPLAINT  Chief Complaint   Patient presents with   • Shortness of Breath     Pt presents to the ED from [facility] for increasing SOB. Pt reports he receives dialysis every Tues, Thurs, Sun but still feels as though he has excess fluid in his lungs. Pt reports he is seen in the ED occassionaly for removal of fluid from lungs.        HPI  Luis Sepulveda is a 75 y.o. male who presents to the emergency department complaining of shortness of breath.  The patient has end-stage renal disease and is on chronic Tuesday Thursday and Saturday dialysis.  He reports going to dialysis is planned.  Since then he has had increasing shortness of breath.  This is associate with a cough.  He has had fluid overload before requiring additional dialysis as well as thoracentesis.  Last hospitalization was complicated by hypotension after his thoracentesis.  With the shortness of breath and cough he has no chest pain he has any fevers or chills.  He does not do much exertion at baseline but he feels shortness of breath even at rest.  He denies any other aggravating alleviating factors or associated complaints.    REVIEW OF SYSTEMS  See HPI for further details. All other systems are negative.    PAST MEDICAL HISTORY  Past Medical History:   Diagnosis Date   • Arrhythmia     hx a fib   • Arthritis 12/29/2020    knees, ankles, back   • CAD (coronary artery disease)     stents   • Chronic anticoagulation 3/26/2020   • Chronic kidney disease (CKD), stage V (HCC)    • Congestive heart failure (HCC)     hx of   • Dental disorder 12/29/2020    partial upper   • Diabetes    • Hemodialysis patient (HCC)     Tuesday, Thursday, Saturday   • Hypertension    • Hypotension 11/28/2019   • Kidney failure    • Myocardial infarct (HCC)     ? 2019    • Osteoarthritis    • Peripheral neuropathy    • Pneumonia     per hx   • Sleep apnea     does not use cpap anymore       FAMILY HISTORY  Family History   Problem  Relation Age of Onset   • Heart Disease Mother    • Alcohol/Drug Father    • Thyroid Son    • Diabetes Brother    • Hypertension Neg Hx    • Hyperlipidemia Neg Hx        SOCIAL HISTORY  Social History     Socioeconomic History   • Marital status:      Spouse name: Not on file   • Number of children: Not on file   • Years of education: Not on file   • Highest education level: Some college, no degree   Occupational History   • Not on file   Tobacco Use   • Smoking status: Former Smoker     Packs/day: 1.00     Years: 55.00     Pack years: 55.00     Types: Cigarettes     Quit date: 2014     Years since quittin.5   • Smokeless tobacco: Never Used   Vaping Use   • Vaping Use: Never used   Substance and Sexual Activity   • Alcohol use: Not Currently   • Drug use: No   • Sexual activity: Not on file   Other Topics Concern   • Not on file   Social History Narrative   • Not on file     Social Determinants of Health     Financial Resource Strain:    • Difficulty of Paying Living Expenses:    Food Insecurity:    • Worried About Running Out of Food in the Last Year:    • Ran Out of Food in the Last Year:    Transportation Needs: Unmet Transportation Needs   • Lack of Transportation (Medical): Yes   • Lack of Transportation (Non-Medical): Yes   Physical Activity: Inactive   • Days of Exercise per Week: 0 days   • Minutes of Exercise per Session: 0 min   Stress: Stress Concern Present   • Feeling of Stress : To some extent   Social Connections: Socially Isolated   • Frequency of Communication with Friends and Family: More than three times a week   • Frequency of Social Gatherings with Friends and Family: More than three times a week   • Attends Sabianism Services: Never   • Active Member of Clubs or Organizations: No   • Attends Club or Organization Meetings: Never   • Marital Status:    Intimate Partner Violence:    • Fear of Current or Ex-Partner:    • Emotionally Abused:    • Physically Abused:    •  Sexually Abused:        SURGICAL HISTORY  Past Surgical History:   Procedure Laterality Date   • AV FISTULA REVISION Left 6/23/2021    Procedure: UPPER EXTREMITY, VENOGRAM;  Surgeon: John Nguyen M.D.;  Location: [facility];  Service: Vascular   • VEIN LIGATION Left 6/23/2021    Procedure: LIGATION, VEIN;  Surgeon: John Nguyen M.D.;  Location: [facility];  Service: Vascular   • CATH PLACEMENT Left 6/23/2021    Procedure: INSERTION, CATHETER -  PERMA CATH;  Surgeon: John Nguyen M.D.;  Location: [facility];  Service: Vascular   • KNEE AMPUTATION BELOW Left 5/28/2021    Procedure: AMPUTATION, BELOW KNEE.;  Surgeon: Jarett Márquez M.D.;  Location: [facility];  Service: Orthopedics   • KNEE AMPUTATION BELOW Right 12/31/2020    Procedure: AMPUTATION, BELOW KNEE ...;  Surgeon: Leobardo Lynn M.D.;  Location: [facility];  Service: Orthopedics   • TOE AMPUTATION Right 11/16/2020    Procedure: AMPUTATION, TOE GREAT;  Surgeon: Leobardo Lynn M.D.;  Location: [facility];  Service: Orthopedics   • TOE AMPUTATION Left 5/17/2020    Procedure: AMPUTATION, TOE GREAT;  Surgeon: Leobardo Lynn M.D.;  Location: [facility];  Service: Orthopedics   • TENOTOMY Left 5/17/2020    Procedure: FLEXOR TENOTOMIES, TWO-FIVE TOES;  Surgeon: Leobardo Lynn M.D.;  Location: [facility];  Service: Orthopedics   • APPENDECTOMY     • OTHER CARDIAC SURGERY      stents x1   • OTHER ORTHOPEDIC SURGERY      knee surgery       CURRENT MEDICATIONS  Home Medications     Reviewed by Tamiko James R.N. (Registered Nurse) on 08/02/21 at 1058  Med List Status: Complete   Medication Last Dose Status   acetaminophen (TYLENOL) 500 MG Tab  Active   apixaban (ELIQUIS) 5mg Tab  Active   atorvastatin (LIPITOR) 40 MG Tab  Active   budesonide (PULMICORT) 0.5 MG/2ML Suspension  Active   calcitRIOL (ROCALTROL) 0.25 MCG Cap  Active   clopidogrel (PLAVIX) 75  "MG Tab  Active   furosemide (LASIX) 40 MG Tab  Active   gabapentin (NEURONTIN) 300 MG Cap  Active   guaiFENesin (ROBITUSSIN) 100 MG/5ML Solution  Active   guaiFENesin ER (MUCINEX) 600 MG TABLET SR 12 HR  Active   insulin glargine (LANTUS SOLOSTAR) 100 UNIT/ML Solution Pen-injector injection  Active   ipratropium-albuterol (DUONEB) 0.5-2.5 (3) MG/3ML nebulizer solution  Active   levoFLOXacin (LEVAQUIN) 250 MG Tab  Active   methimazole (TAPAZOLE) 5 MG Tab  Active   metoprolol SR (TOPROL XL) 25 MG TABLET SR 24 HR  Active   midodrine (PROAMATINE) 5 MG Tab  Active   omeprazole (PRILOSEC) 20 MG delayed-release capsule  Active   sevelamer carbonate (RENVELA) 800 MG Tab tablet  Active   tamsulosin (FLOMAX) 0.4 MG capsule  Active   traZODone (DESYREL) 50 MG Tab  Active                ALLERGIES  Allergies   Allergen Reactions   • Mircera [Methoxy Polyethylene Glycol-Epoetin Beta] Hives   • Other Drug Unspecified     \"Opioids\" caused hallucinations       PHYSICAL EXAM  VITAL SIGNS: /50   Pulse 97   Temp 36.6 °C (97.9 °F) (Temporal)   Resp 17   Ht 1.702 m (5' 7\")   Wt 69.4 kg (153 lb)   SpO2 100%   BMI 23.96 kg/m²    Constitutional: Awake alert nontoxic mild to moderate distress  HENT: Normocephalic, Atraumatic, Bilateral external ears normal, Oropharynx moist, No oral exudates,  Eyes: PERRL, EOMI, Conjunctiva normal, No discharge.   Neck: Normal range of motion  Cardiovascular: Normal heart rate, Normal rhythm, No murmurs, No rubs, No gallops.   Thorax & Lungs: Manage breath sounds in the right base and right midlung.  Crackles in the left.  Abdomen: Bowel sounds normal, Soft, No tendernes  Back: No tenderness, No CVA tenderness.   Musculoskeletal: Good range of motion in all major joints.  Previous above-the-knee amputations  Neurologic: Alert, No focal deficits noted.   Psychiatric: Affect normal    Results for orders placed or performed during the hospital encounter of 08/02/21   CBC with Differential   Result " Value Ref Range    WBC 7.6 4.8 - 10.8 K/uL    RBC 2.77 (L) 4.70 - 6.10 M/uL    Hemoglobin 8.4 (L) 14.0 - 18.0 g/dL    Hematocrit 29.1 (L) 42.0 - 52.0 %    .1 (H) 81.4 - 97.8 fL    MCH 30.3 27.0 - 33.0 pg    MCHC 28.9 (L) 33.7 - 35.3 g/dL    RDW 55.3 (H) 35.9 - 50.0 fL    Platelet Count 319 164 - 446 K/uL    MPV 9.4 9.0 - 12.9 fL    Neutrophils-Polys 79.60 (H) 44.00 - 72.00 %    Lymphocytes 13.30 (L) 22.00 - 41.00 %    Monocytes 6.20 0.00 - 13.40 %    Eosinophils 0.90 0.00 - 6.90 %    Basophils 0.00 0.00 - 1.80 %    Nucleated RBC 0.00 /100 WBC    Neutrophils (Absolute) 6.05 1.82 - 7.42 K/uL    Lymphs (Absolute) 1.01 1.00 - 4.80 K/uL    Monos (Absolute) 0.47 0.00 - 0.85 K/uL    Eos (Absolute) 0.07 0.00 - 0.51 K/uL    Baso (Absolute) 0.00 0.00 - 0.12 K/uL    NRBC (Absolute) 0.00 K/uL   Comp Metabolic Panel   Result Value Ref Range    Sodium 140 135 - 145 mmol/L    Potassium 5.1 3.6 - 5.5 mmol/L    Chloride 100 96 - 112 mmol/L    Co2 22 20 - 33 mmol/L    Anion Gap 18.0 (H) 7.0 - 16.0    Glucose 122 (H) 65 - 99 mg/dL    Bun 35 (H) 8 - 22 mg/dL    Creatinine 4.70 (H) 0.50 - 1.40 mg/dL    Calcium 9.5 8.5 - 10.5 mg/dL    AST(SGOT) 7 (L) 12 - 45 U/L    ALT(SGPT) 5 2 - 50 U/L    Alkaline Phosphatase 128 (H) 30 - 99 U/L    Total Bilirubin <0.2 0.1 - 1.5 mg/dL    Albumin 2.3 (L) 3.2 - 4.9 g/dL    Total Protein 5.4 (L) 6.0 - 8.2 g/dL    Globulin 3.1 1.9 - 3.5 g/dL    A-G Ratio 0.7 g/dL   COV-2, FLU A/B, AND RSV BY PCR (2-4 HOURS CEPHEID): Collect NP swab in VTM    Specimen: Respirate   Result Value Ref Range    Influenza virus A RNA Negative Negative    Influenza virus B, PCR Negative Negative    RSV, PCR Negative Negative    SARS-CoV-2 by PCR NotDetected     SARS-CoV-2 Source NP Swab    LACTIC ACID   Result Value Ref Range    Lactic Acid 1.8 0.5 - 2.0 mmol/L   DIFFERENTIAL MANUAL   Result Value Ref Range    Manual Diff Status PERFORMED    PERIPHERAL SMEAR REVIEW   Result Value Ref Range    Peripheral Smear Review see  below    PLATELET ESTIMATE   Result Value Ref Range    Plt Estimation Normal    MORPHOLOGY   Result Value Ref Range    RBC Morphology Present     Poikilocytosis 1+     Ovalocytes 1+     Hypersegmented Poly Moderate    ESTIMATED GFR   Result Value Ref Range    GFR If  15 (A) >60 mL/min/1.73 m 2    GFR If Non  12 (A) >60 mL/min/1.73 m 2   EKG   Result Value Ref Range    Report       [facility] Emergency Dept.    Test Date:  2021  Pt Name:    KLARISSA BRADSHAW              Department: ER  MRN:        [id number]                      Room:       [facility]  Gender:     Male                         Technician: [id number]  :        1945                   Requested By:ER TRIAGE PROTOCOL  Order #:    [id number]                    Reading MD: GIANNI BARBA. [facility]    Measurements  Intervals                                Axis  Rate:       97                           P:          62  MD:         188                          QRS:        -79  QRSD:       139                          T:          139  QT:         359  QTc:        456    Interpretive Statements  Sinus rhythm  Ventricular bigeminy  RBBB and LAFB  Abnormal T, consider ischemia, lateral leads  Compared to ECG 2021 23:47:07  Ventricular premature complex(es) now present  T-wave abnormality now present  No significant change from prior  Electronically Signed On 2021 14:23:43 PDT by GIANNI BARBA. [facility]          RADIOLOGY/PROCEDURES  DX-CHEST-PORTABLE (1 VIEW)   Final Result      1.  Stable cardiomegaly.      2.  Worsening left basilar opacification is concerning for developing pneumonia.      3.  No significant change in right pleural effusion with adjacent airspace opacification.      4.  Interval increase in interstitial edema            COURSE & MEDICAL DECISION MAKING  Pertinent Labs & Imaging studies reviewed. (See chart for details)  The patient presents emergency room with shortness of breath cough, and  dyspnea with any activity.  Differential diagnosis includes but is not limited to volume overload associated with end-stage renal disease, pleural effusion, pneumonia, COVID-19.    The patient is worked up with above differential diagnosis with labs and x-ray and EKG.    Chart is reviewed for baseline labs and previous work-up for comparison.     The patient's chest x-ray shows a pleural effusion.  Clinically has a large pleural effusion based on his chest auscultation.  He has some crackles on the left.  Differential diagnosis remains edema versus pneumonia.    Is not febrile ill or toxic.  He is unlikely to be pneumonia.  I do think he would benefit from possible repeat thoracentesis and maybe dialysis.  He does have some evidence of volume overload and interstitial edema.    The patient be hospice for further work-up and treatment because of his significant dyspnea from his baseline and volume overload.  Discussed case with the hospitalist and care is transferred at that time.        FINAL IMPRESSION  1. Acute on chronic respiratory failure, unspecified whether with hypoxia or hypercapnia (HCC)     2. Other hypervolemia     3. Pleural effusion     4. ESRD (end stage renal disease) (HCC)                  Electronically signed by: Db Brizuela M.D., 8/2/2021 11:36 AM

## 2021-08-03 PROBLEM — I49.01 VENTRICULAR FIBRILLATION (HCC): Status: ACTIVE | Noted: 2021-01-01

## 2021-08-03 NOTE — PROGRESS NOTES
Monitor check called on patient in dialysis. This RN ran down patient was in vfib on the monitor screen. Told dialysis RN's patient is DNAR, I OK. ER doc at notified, rapid response team and code team present. Dr. Locke pronounced death at 1524, resident Dr. Finn notified by charge RN.

## 2021-08-03 NOTE — ASSESSMENT & PLAN NOTE
Has a history of HFrEF 30%    -Pontine: 190; will continue to trend  -BNP: Greater than 35,000  -Holding Lasix at this time due to soft blood pressure  -Monitoring vitals  -Monitoring for signs of fluid overload

## 2021-08-03 NOTE — DISCHARGE PLANNING
Medical Social Work    Referral: CODE BLUE    Intervention: LSW responded to code blue in dialysis. ERP, Dr. Locke at bedside.  Pt identified as a DNR, I ok. LSW attempted to call pt's spouse and DPOA, [place] [phone] 4x, no answer. LSW tried secondary number for [place] and her and pt's son, Miguel, answered. LSW handed phone off to Dr. Locke who notified family of pt's passing. LSW spoke to Miguel again who will be on his way with his mother and other siblings shortly. Emotional support provided. Advised family to call this LSW as soon as they arrive to [facility] to I can escort them to pt's bedside. Provided with [phone] ext.     Plan: Meet with family to provide emotional support, provide difficult times packet.

## 2021-08-03 NOTE — PROGRESS NOTES
MONITOR SUMMARY  Intervals .19/.15/.40   Rhythm SR-ST , A Fib/Flutter 91  Ectopy (F) PVC, (R/F) Coup, Big, (R) Trig

## 2021-08-03 NOTE — CARE PLAN
Problem: Knowledge Deficit - Standard  Goal: Patient and family/care givers will demonstrate understanding of plan of care, disease process/condition, diagnostic tests and medications  Outcome: Progressing     Problem: Pain - Standard  Goal: Alleviation of pain or a reduction in pain to the patient’s comfort goal  Outcome: Progressing     Problem: Fall Risk  Goal: Patient will remain free from falls  Outcome: Progressing     Problem: Knowledge Deficit - COPD  Goal: Patient/significant other demonstrates understanding of disease process, utilization of the Action Plan, medications and discharge instruction  Outcome: Progressing   The patient is Watcher - Medium risk of patient condition declining or worsening    Shift Goals  Clinical Goals: improved oxygentation  Patient Goals: improved work of breathing    Progress made toward(s) clinical / shift goals:  progressing    Patient is not progressing towards the following goals:

## 2021-08-03 NOTE — ASSESSMENT & PLAN NOTE
On examination patient remarked on times feel like he has to urinate but does not.    -On tamsulosin 0.4 mg; continue  -Ordered bladder scan to evaluate for urinary retention; follow-up

## 2021-08-03 NOTE — RESPIRATORY CARE
COPD EDUCATION by COPD CLINICAL EDUCATOR  8/3/2021  at  7:54 AM by Yecenia Aranda, RRT     Patient interviewed by COPD education team.  Patient unable to participate in full program.  A short intervention has been conducted.  A comprehensive packet including information about COPD, types of treatments to manage their disease and safe home Oxygen usage was provided and reviewed with patient at the bedside.     COPD Screen  COPD Risk Screening  Do you have a history of COPD?: Yes  Do you have a Pulmonologist?: Yes    COPD Assessment  COPD Clinical Specialists ONLY  COPD Education Initiated: Yes--Short Intervention (COPD exacerbation, no PFT on file. no meds at home. may be going to SNF or rehab)  Physician Name: ARTEM Cabezas  Pulmonologist Name: patient is interested in follow up with [facility]. pending outpatient pulmonary referral. patient qualifies for COPD remote monitoring, however patient may be going to rehab/SNF?  Referrals Initiated: Yes  Pulmonary Rehab: Yes  Smoking Cessation: N/A (quit smoking in 2011)  Hospice: N/A (7/15/2021 order placed, patient is not interested at this time)  Home Health Care:  (pending. patient states he may be going to rehab)  [facility] Outreach: N/A  [facility]: N/A  [facility]:  (pending. patient states he may be going to rehab)  Private In-Home Care Agency: N/A  Is this a COPD exacerbation patient?: Yes  $ Demo/Eval of SVN's, MDI's and Aerosols: Yes  (OP) Pulmonary Function Testing:  (not on file)    Meds to Beds  Would the patient like to opt in for Bedside Medication Delivery at Discharge?: Yes, interested     MY COPD ACTION PLAN     It is recommended that patients and physicians /healthcare providers complete this action plan together. This plan should be discussed at each physician visit and updated as needed.    The green, yellow and red zones show groups of symptoms of COPD. This list of symptoms is not comprehensive, and you  "may experience other symptoms. In the \"Actions\" column, your healthcare provider has recommended actions for you to take based on your symptoms.    Patient Name: Luis Sepulveda   YOB: 1945   Last Updated on: 8/3/2021  7:53 AM   Green Zone:  I am doing well today Actions   •  Usual activitiy and exercise level •  Take daily medications   •  Usual amounts of cough and phlegm/mucus •  Use oxygen as prescribed   •  Sleep well at night •  Continue regular exercise/diet plan   •  Appetite is good •  At all times avoid cigarette smoke, inhaled irritants     Daily Medications (these medications are taken every day):                Yellow Zone:  I am having a bad day or a COPD flare Actions   •  More breathless than usual •  Continue daily medications   •  I have less energy for my daily activities •  Use quick relief inhaler as ordered   •  Increased or thicker phlegm/mucus •  Use oxygen as prescribed   •  Using quick relief inhaler/nebulizer more often •  Get plenty of rest   •  Swelling of ankles more than usual •  Use pursed lip breathing   •  More coughing than usual •  At all times avoid cigarette smoke, inhaled irritants   •  I feel like I have a \"chest cold\"   •  Poor sleep and my symptoms woke me up   •  My appetite is not good   •  My medicine is not helping    •  Call provider immediately if symptoms don’t improve     Continue daily medications, add rescue medications:               Medications to be used during a flare up, (as Discussed with Provider):              Red Zone:  I need urgent medical care Actions   •  Severe shortness of breath even at rest •  Call 911 or seek medical care immediately   •  Not able to do any activity because of breathing    •  Fever or shaking chills    •  Feeling confused or very drowsy     •  Chest pains    •  Coughing up blood              "

## 2021-08-03 NOTE — ASSESSMENT & PLAN NOTE
History of CAD with stent at [facility] around 10 years ago    -On midodrine 5, metoprolol 25, atorvastatin 40  -Will hold metoprolol due to soft blood pressure; monitor vitals  -Continue midodrine, atorvastatin  -Will stop Eliquis; replaced with heparin for potential dialysis tomorrow

## 2021-08-03 NOTE — PROGRESS NOTES
Pt refusing speech evaluation, MD notified and spoke with patient about the risks. Patient still wanting to refuse evaluation.

## 2021-08-03 NOTE — PROGRESS NOTES
Received notice from lab that pt had elevated tropinin level of 198.  Trina TRAN MD to order repeat trops to trend.

## 2021-08-03 NOTE — DOCUMENTATION QUERY
[facility]                                                                       Query Response Note      PATIENT:               KLARISSA BRADSHAW  ACCT #:                  [id number]  MRN:                     [id number]  :                      1945  ADMIT DATE:       2021 6:11 PM  DISCH DATE:        2021 8:04 PM  RESPONDING  PROVIDER #:        [id number]           QUERY TEXT:    Documented on PN 7/10 is septic shock and SIRS criteria identified; noted as present on admission, source unknown.   IV antibiotics as appropriate for source of sepsis. Antibiotics completed on . Patient became hypotensive on  after Thoracentesis and was put on vasopressors.     Please clarify the following:    NOTE:  If an appropriate response is not listed below, please respond with a new note.      The patient's Clinical Indicators include:  Clinical Indicators:  Respirations greater than 20  Heart rate greater than 90  Hypotension 2021    Treatment and monitoring:  IV antibiotics  Thoracentesis    Risk Factors:  Pleural effusion  CHF  recent pneumonia  [email or web address] [email or web address]  Options provided:   -- Severe Sepsis with shock present on admission (please document known or suspected etiology)   -- Severe Sepsis with shock developed after admission (please document known or suspected etiology)   -- Sepsis present on admit, with shock developing after admission (please document known or suspected etiology)   -- Unable to determine      Query created by: Rosemarie Maki on 8/3/2021 3:23 AM    RESPONSE TEXT:    Unable to determine          Electronically signed by:  DEJON LOPEZ MD 8/3/2021 7:11 AM

## 2021-08-03 NOTE — CARE PLAN
The patient is Watcher - Medium risk of patient condition declining or worsening    Shift Goals  Clinical Goals: improved oxygentation  Patient Goals: improved work of breathing      Problem: Knowledge Deficit - Standard  Goal: Patient and family/care givers will demonstrate understanding of plan of care, disease process/condition, diagnostic tests and medications  Outcome: Progressing     Problem: Fall Risk  Goal: Patient will remain free from falls  Outcome: Progressing     Problem: Skin Integrity  Goal: Skin integrity is maintained or improved  Outcome: Progressing

## 2021-08-03 NOTE — NON-PROVIDER
"Daily Progress Note:     Date of Service: 8/3/2021  Primary Team: [place]/White  Attending: Valdez White M.D.   Senior Resident: Sultan Huma M.D.   Intern: Valencia Jordan M.D.  Medical Student: Nata Ponce    Chief Complaint: Shortness of breath    ID: Luis \"Adalid\" Derick is a 76yo male presented to the ED 8/2/2021 with his wife. He is a reliable historian.      Subjective: Adalid presented this morning with small improvement in his shortness of breath. He understands that his progress will be gradual. He feels fatigue from the lack of sleep he got from the night before, which he mainly attributed it to the \"achy\" pain in his left 2nd, 3rd, 4th fingers and left palm of hand. The pain is an 8/10 (baseline 4-6/10). He usually manages the pain by applying body heat to the hand, which he did during my visit. He continues to cough with no significant changes or improvement. Night nurses reported him choking while taking his meds overnight, but he says he was able to take it with apple sauce. He was able to drink water normally. Morning nurse suggested NPO and speech evaluations, but he refused due to poor experience with the thick diet from prior hospitalizations. Denies difficult swallowing, headaches, chills, nausea, chest pain, palpitations, and dizziness.     Consultants/Specialty:  Respiratory Therapist for COPD  Nephrology for ESRD    Review of Systems:    Review of Systems   Constitutional: Positive for malaise/fatigue. Negative for chills and fever.   HENT: Positive for sore throat.    Eyes: Negative for blurred vision and double vision.   Respiratory: Positive for cough and shortness of breath. Negative for wheezing.    Cardiovascular: Negative for chest pain, palpitations and leg swelling.   Gastrointestinal: Negative for abdominal pain, nausea and vomiting.   Genitourinary: Negative for dysuria, frequency and urgency.   Skin: Negative for itching and rash.   Neurological: Positive for tingling and " sensory change. Negative for dizziness and headaches.   Endo/Heme/Allergies: Bruises/bleeds easily.   Psychiatric/Behavioral: Negative for hallucinations.       Objective: Adalid is stable despite low BP of 99/47.   Physical Exam:   Vitals:   Temp:  [36.2 °C (97.1 °F)-36.7 °C (98.1 °F)] 36.7 °C (98.1 °F)  Pulse:  [] 98  Resp:  [16-20] 18  BP: ()/() 99/49  SpO2:  [92 %-98 %] 96 %    Physical Exam  Vitals reviewed.   Constitutional:       General: He is awake.      Appearance: Normal appearance.      Interventions: Nasal cannula in place.   HENT:      Mouth/Throat:      Mouth: Mucous membranes are moist.   Eyes:      General: Lids are normal. Vision grossly intact.      Extraocular Movements: Extraocular movements intact.      Conjunctiva/sclera: Conjunctivae normal.   Neck:      Vascular: No carotid bruit.   Cardiovascular:      Rate and Rhythm: Normal rate and regular rhythm.      Pulses: Normal pulses.      Heart sounds: Heart sounds are distant.      Arteriovenous access: left arteriovenous access is present.  Pulmonary:      Breath sounds: Rhonchi present.   Abdominal:      General: Bowel sounds are normal. There is no distension.      Palpations: Abdomen is soft.      Tenderness: There is no abdominal tenderness. There is no guarding.   Musculoskeletal:         General: No swelling.      Right lower leg: No edema.      Left lower leg: No edema.   Skin:     General: Skin is warm.      Findings: Bruising (on right hand. ) present.      Comments: Left 2nd, 3rd, and 4th digit necrosis.   Neurological:      General: No focal deficit present.      Mental Status: He is alert. Mental status is at baseline.   Psychiatric:         Mood and Affect: Mood normal.         Behavior: Behavior is cooperative.           Labs:   Recent Labs     08/02/21  1121 08/03/21  0354   WBC 7.6 7.5   RBC 2.77* 3.12*   HEMOGLOBIN 8.4* 9.3*   HEMATOCRIT 29.1* 32.7*   .1* 104.8*   MCH 30.3 29.8   RDW 55.3* 55.5*    PLATELETCT 319 376   MPV 9.4 9.5   NEUTSPOLYS 79.60*  --    LYMPHOCYTES 13.30*  --    MONOCYTES 6.20  --    EOSINOPHILS 0.90  --    BASOPHILS 0.00  --    RBCMORPHOLO Present  --      Recent Labs     08/02/21  1121 08/03/21  0354   SODIUM 140 140   POTASSIUM 5.1 5.6*   CHLORIDE 100 97   CO2 22 25   GLUCOSE 122* 119*   BUN 35* 45*     Recent Labs     08/02/21  1121 08/03/21  0354   ALBUMIN 2.3* 2.8*   TBILIRUBIN <0.2 <0.2   ALKPHOSPHAT 128* 129*   TOTPROTEIN 5.4* 5.9*   ALTSGPT 5 <5   ASTSGOT 7* 8*   CREATININE 4.70* 5.40*       Imaging:   DX-CHEST-PORTABLE (1 VIEW)   Final Result      1.  Stable cardiomegaly.      2.  Worsening left basilar opacification is concerning for developing pneumonia.      3.  No significant change in right pleural effusion with adjacent airspace opacification.      4.  Interval increase in interstitial edema          Assessment and Plan:  1. Acute respiratory failure with hypoxia  History of COPD, HFrEF, and ESRD on hemodialysis Tuesday, Thursday, and Saturday. In the ED, he was on 4.5 L of oxygen, later increased to 6 L after admission. Respiratory therapist today lowered him to 4 L after evaluation and O2 sat of 98%. No change in mental status is a good sign     - Continue management for COPD exacerbation with:    Continuous monitoring of respiratory status (RR, effort, and O2 sat), HR/rhythm, BP, and fluid status   Prednisone 40mg PO once daily   Duoned (ipratropium-albuterol) nebulizer solution

## 2021-08-03 NOTE — DISCHARGE PLANNING
Anticipated Discharge Disposition: SNF    Action: Pt discussed during IDT rounds. LSW informed that pt came from SNF- [facility]. Pt anticipated to return to SNF upon d/c. LSW attempted to meet with pt at bedside to discuss SNF CHOICE to return to [facility]. Pt not available.     Barriers to Discharge: None    Plan: collect SNF CHOICE to return to SNF, Await [facility] acceptance, Care management to continue to assist with d/c needs    Addendum 1436  LSW attempted to meet pt at bedside to collect SNF CHOICE and pt not in room.   Bedside RNSally confirmed that pt was in dialysis.     Addendum 1537  LSW informed by Vida MORALES that pt has passed while down in dialysis. Vida has contacted family.

## 2021-08-03 NOTE — PROGRESS NOTES
75 year old male with ESRD, HFrEF admitted for acute respiratory failure with hypoxia - need for increased supplemental oxygen.     Notified of troponin level of 198 drawn at 1147. Patient not complaining of chest pain and has other causes of increased troponin, including ESRD, heart failure, and COPD exacerbation making demand ischemia more likely.     Plan: Trend troponin

## 2021-08-03 NOTE — PROGRESS NOTES
Received report from JAN Dumont. Pt is A&O x 4. Pt transported via [facility] with ACLS RN and zoll monitor. Pt arrived to unit, tele box on patient, confirmed with(CCT) from monitor room. Pt ambulated steadily with standby assist from rney to hospital bed, tolerated well. Pt oriented to unit and call light, verbalized understanding. Fall precautions in place. Call light and bedside table within reach, bed locked in lowest position with controls on. Assessment completed. Will continue to monitor.

## 2021-08-03 NOTE — CONSULTS
"Consults  [facility] NEPHROLOGY CONSULT NOTE    CHIEF COMPLAINT:   -  \"SOB  \"     HISTORY OF PRESENT ILLNESS:    74 y/o man with ESRD HD T,TH,Sat presented with SOB for the last 2 days. Pt has H/O reccurrent pleural effusion. Pt /sp thoracentisis in the last admission. No F/C/N/V/CP  No melena, hematochezia, hematemesis.  No HA, visual changes, or abdominal pain. Nephrology was asked to see him for ESRD and dialysis management and maintenance.     REVIEW OF SYSTEMS:    Constitutional: Positive for malaise/fatigue. Negative for chills, diaphoresis, fever and weight loss.   HENT: Negative for sore throat.    Eyes: Negative for blurred vision and double vision.   Respiratory: Positive for cough, sputum production and shortness of breath.    Cardiovascular: Negative for chest pain, palpitations, orthopnea and leg swelling.   Gastrointestinal: Negative for abdominal pain, constipation, diarrhea, heartburn, nausea and vomiting.   Genitourinary: Negative for dysuria.   Musculoskeletal: Negative for myalgias.   Skin: Negative for rash.   Neurological: Positive for weakness. Negative for dizziness, tingling, tremors, loss of consciousness and headaches.     PAST MEDICAL HISTORY:   1.  ESRD, on dialysis Tuesday, Thursday and Saturday.  2.  Chronic hypotension.  3.  Anemia of chronic kidney disease.  4.  Renal osteodystrophy.  5.  Congestive heart failure.  6.  Peripheral vascular disease.  7.  AFib.     PAST SURGICAL HISTORY:   - AV fistula ligation     FAMILY HISTORY:   - Reviewed and non contributory to current illness     SOCIAL HISTORY:   - No tobacco  - No EtOH  - No illicits  - newly marreid    HOME MEDICATIONS:   Medications: Medications have been reviewed with patient.  Prior to Admission Medications   Prescriptions Last Dose Informant Patient Reported? Taking?   acetaminophen (TYLENOL) 500 MG Tab 8/2/2021 at 0825   No No   Sig: Take 2 Tablets by mouth in the morning, at noon, and at bedtime.   apixaban (ELIQUIS) " 5mg Tab 8/2/2021 at 0825 MAR from Other Facility No No   Sig: Take 1 tablet by mouth 2 times a day. Indications: Thromboembolism secondary to Atrial Fibrillation   atorvastatin (LIPITOR) 40 MG Tab 8/1/2021 at 2316 MAR from Other Facility No No   Sig: Take 1 tablet by mouth at bedtime.   calcitRIOL (ROCALTROL) 0.25 MCG Cap 8/2/2021 at 0825 MAR from Other Facility No No   Sig: Take 1 capsule by mouth every day.   clopidogrel (PLAVIX) 75 MG Tab 8/2/2021 at 0825 MAR from Other Facility No No   Sig: Take 1 tablet by mouth every day.   furosemide (LASIX) 40 MG Tab 8/2/2021 at 0825 MAR from Other Facility Yes No   Sig: Take 40 mg by mouth see administration instructions. Take 1 tablet (40 mg) by mouth once per day on Sundays, Mondays, Wednesdays, and Fridays only (non-dialysis days).   gabapentin (NEURONTIN) 300 MG Cap 8/2/2021 at 0825   No No   Sig: Take 1 capsule by mouth 3 times a day as needed (Neuropathic pain).   guaiFENesin ER (MUCINEX) 600 MG TABLET SR 12 HR 8/2/2021 at 0825   No No   Sig: Take 1 tablet by mouth 2 times a day.   insulin glargine (LANTUS SOLOSTAR) 100 UNIT/ML Solution Pen-injector injection 8/1/2021 at 2111 MAR from Other Facility Yes No   Sig: Inject 10 Units under the skin at bedtime.   ipratropium-albuterol (DUONEB) 0.5-2.5 (3) MG/3ML nebulizer solution 8/2/2021 at 0904 MAR from Other Facility Yes No   Sig: Take 3 mL by nebulization 2 times a day.   methimazole (TAPAZOLE) 5 MG Tab 8/2/2021 at 0825 MAR from Other Facility No No   Sig: Take 1 tablet by mouth 2 times a day.   metoprolol SR (TOPROL XL) 25 MG TABLET SR 24 HR 7/30/2021 at 1010   No No   Sig: Take 1 tablet by mouth every day.   midodrine (PROAMATINE) 5 MG Tab 8/2/2021 at 0825   No No   Sig: Take 3 Tablets by mouth 3 times a day with meals.   omeprazole (PRILOSEC) 20 MG delayed-release capsule 8/2/2021 at 0825 MAR from Other Facility No No   Sig: Take 1 capsule by mouth 2 times a day.   sevelamer carbonate (RENVELA) 800 MG Tab tablet  "8/2/2021 at 0825 MAR from Other Facility No No   Sig: Take 2 Tablets by mouth 3 times a day with meals.   tamsulosin (FLOMAX) 0.4 MG capsule 8/1/2021 at 2316 MAR from Other Facility No No   Sig: Take 2 Capsules by mouth at bedtime.   traZODone (DESYREL) 50 MG Tab 8/1/2021 at 2316 MAR from Other Facility Yes No   Sig: Take 50 mg by mouth at bedtime.      Facility-Administered Medications: None         Allergies: Allergies have been reviewed with patient.        Allergies   Allergen Reactions   • Mircera [Methoxy Polyethylene Glycol-Epoetin Beta] Hives   • Other Drug Unspecified       \"Opioids\" caused hallucinations     Physical Exam  Vitals: reviewed and trend documented in the chart    Constitutional:       General: He is not in acute distress.     Appearance: Normal appearance. He is not ill-appearing, toxic-appearing or diaphoretic.   HENT:      Head: Normocephalic and atraumatic.      Mouth/Throat:      Mouth: Mucous membranes are moist.      Pharynx: Oropharynx is clear. No oropharyngeal exudate or posterior oropharyngeal erythema.   Eyes:      General: No scleral icterus.        Right eye: No discharge.         Left eye: No discharge.   Cardiovascular:      Rate and Rhythm: Tachycardia present. Rhythm irregular.      Heart sounds: No murmur heard.   No gallop.    Pulmonary:      Effort: Pulmonary effort is normal.      Breath sounds: Rhonchi present. No wheezing or rales.   Abdominal:      General: Abdomen is flat. There is no distension.      Palpations: Abdomen is soft. There is no mass.      Tenderness: There is no abdominal tenderness. There is no guarding or rebound.   Musculoskeletal:         General: No swelling or tenderness.      Right lower leg: No edema.      Left lower leg: No edema.   Skin:     Coloration: Skin is not jaundiced.      Findings: No erythema or rash.   Neurological:      Mental Status: He is alert and oriented to person, place, and time. Mental status is at baseline.   Psychiatric:    "      Mood and Affect: Mood normal.         Behavior: Behavior normal.         Thought Content: Thought content normal.     Labs:    Results for SEGUN BRADSHAW (MRN [id number]) as of 8/3/2021 09:53   Ref. Range 8/3/2021 03:54   WBC Latest Ref Range: 4.8 - 10.8 K/uL 7.5   RBC Latest Ref Range: 4.70 - 6.10 M/uL 3.12 (L)   Hemoglobin Latest Ref Range: 14.0 - 18.0 g/dL 9.3 (L)   Hematocrit Latest Ref Range: 42.0 - 52.0 % 32.7 (L)   MCV Latest Ref Range: 81.4 - 97.8 fL 104.8 (H)   MCH Latest Ref Range: 27.0 - 33.0 pg 29.8   MCHC Latest Ref Range: 33.7 - 35.3 g/dL 28.4 (L)   RDW Latest Ref Range: 35.9 - 50.0 fL 55.5 (H)   Platelet Count Latest Ref Range: 164 - 446 K/uL 376   MPV Latest Ref Range: 9.0 - 12.9 fL 9.5   Sodium Latest Ref Range: 135 - 145 mmol/L 140   Potassium Latest Ref Range: 3.6 - 5.5 mmol/L 5.6 (H)   Chloride Latest Ref Range: 96 - 112 mmol/L 97   Co2 Latest Ref Range: 20 - 33 mmol/L 25   Anion Gap Latest Ref Range: 7.0 - 16.0  18.0 (H)   Glucose Latest Ref Range: 65 - 99 mg/dL 119 (H)   Bun Latest Ref Range: 8 - 22 mg/dL 45 (H)   Creatinine Latest Ref Range: 0.50 - 1.40 mg/dL 5.40 (HH)   GFR If  Latest Ref Range: >60 mL/min/1.73 m 2 13 (A)   GFR If Non  Latest Ref Range: >60 mL/min/1.73 m 2 10 (A)   Calcium Latest Ref Range: 8.5 - 10.5 mg/dL 9.6   AST(SGOT) Latest Ref Range: 12 - 45 U/L 8 (L)   ALT(SGPT) Latest Ref Range: 2 - 50 U/L <5   Alkaline Phosphatase Latest Ref Range: 30 - 99 U/L 129 (H)   Total Bilirubin Latest Ref Range: 0.1 - 1.5 mg/dL <0.2   Albumin Latest Ref Range: 3.2 - 4.9 g/dL 2.8 (L)   Total Protein Latest Ref Range: 6.0 - 8.2 g/dL 5.9 (L)   Globulin Latest Ref Range: 1.9 - 3.5 g/dL 3.1   A-G Ratio Latest Units: g/dL 0.9       Imaging:   DX-CHEST-PORTABLE (1 VIEW)   Final Result       1.  Stable cardiomegaly.       2.  Worsening left basilar opacification is concerning for developing pneumonia.       3.  No significant change in right pleural effusion  with adjacent airspace opacification.       4.  Interval increase in interstitial edema     IMPRESSION:  # ESRD T,TH,Sat  - Etiology likely 2/2 vascualr disease  # SOB multifactorial including effusion  # CAD  # BPH  # Anemia of CKD  # PVD  # CHF with decreased EF 30%  # Hyperthyroidism  # DM  # GERD     PLAN:  - HD today per TTS schedule  - EPO with HD  - Midodrine  - Dose all meds per ESRD  - Avoid nephrotoxins  - Renal diet

## 2021-08-03 NOTE — PROGRESS NOTES
4 Eyes Skin Assessment Completed by JAN Dumont and JAN Zavala.    Head WDL  Ears Redness and Blanching  Nose WDL  Mouth WDL  Neck WDL  Breast/Chest WDL  Shoulder Blades WDL  Spine WDL  (R) Arm/Elbow/Hand Bruising, Redness, Blanching  (L) Arm/Elbow/Hand Redness, Blanching and Bruising Abrasion, 2nd, 3rd, and fourth finger necrosis          Abdomen WDL  Groin Swelling  Scrotum/Coccyx/Buttocks Redness, Blanching, Excoriation and Moisture Fissure     (R) Leg Scar , BKA  (L) Leg Scar and Scab, BKA    (R) Heel/Foot/Toe N/A BKA  (L) Heel/Foot/Toe N/A BKA          Devices In Places Tele Box, Pulse Ox and Nasal Cannula, L HD Cath      Interventions In Place NC W/Ear Foams, Pillows, Q2 Turns and Pressure Redistribution Mattress    Possible Skin Injury Yes    Pictures Uploaded Into Epic Yes  Wound Consult Placed Yes  RN Wound Prevention Protocol Ordered Yes

## 2021-08-03 NOTE — ASSESSMENT & PLAN NOTE
History of end-stage renal disease currently on hemodialysis with port placement after AV fistula failed.    -Reports compliance with his hemodialysis Tuesday, Thursday, and Saturday schedule.  -On Calcitrol  -No indication for urgent dialysis at this time.  Consulted with nephrology who will follow patient in the morning for continued dialysis.

## 2021-08-03 NOTE — CODE DOCUMENTATION
RRT called to code blue - pt in dialysis, monitor check called on tele box for v fib. Pt is DNR, I OK.  Rescue breaths given via BVM by RT. Attached to zoll - confirmed v fib arrest at 1520.     1522 Dr. Locke attempted precordial thump. Still v fib on zoll.    1523 Time of death called by Dr. Jarrett Locke.    1527 Pt's wife notified of pt's passing by Dr. Locke.

## 2021-08-03 NOTE — ASSESSMENT & PLAN NOTE
History of COPD and ESRD on hemodialysis with no home oxygen use presenting with worsening shortness of breath over past 2 days currently requiring 4.5 L in the emergency department.    -Test x-ray shows recurrent right-sided pleural effusion, which the patient is been admitted for multiple times in the past.  -Hold off on patient's at this time due to soft blood pressure.  Nephrology outpatient tomorrow for hemodialysis.  -[facility] for evaluation of TB, troponin, NT proBNP, and ABG for further evaluation.  -Possibly due to worsening COPD exacerbation.  Will treat for COPD exacerbation with 5 days of steroids, scheduled duo nebs, Symbicort, and doxycycline.  -For further infection with ceftriaxone.

## 2021-08-03 NOTE — ASSESSMENT & PLAN NOTE
And has a history of approximately 5 admissions this year for pleural effusions; thoracocentesis is therapeutic but last time caused the patient to go into shock; was transferred to ICU and treated with pressors where he was stabilized.    -Chest x-ray reveals stable cardiomegaly, worsening left basilar opacification (pneumonia?),  No change in right pleural effusion with adjacent airspace opacification, interval increase in interstitial edema.   -Holding fluids for now.  -Holding Lasix for now

## 2021-08-04 LAB — ADENOSINE DEAMINASE RBC-CCNT: 558 MU/G HB (ref 400–900)

## 2021-08-04 NOTE — PROGRESS NOTES
Went to see patient at the dialysis unit after patient went into arrhythmia.  The patient was found to be in ventricular fibrillation by the code team.    I examined the patient and noted no respirations, no heart sounds, and no   withdrawal to pain. Pupil were fixed and dilated.     Time of death: (1524)  Next of kin was notified.   Death summary will be completed by: MD Dr. Valencia Novak was also present during this time.

## 2021-08-04 NOTE — ED PROVIDER NOTES
ED Provider Note    CHIEF COMPLAINT  Chief Complaint   Patient presents with   • Shortness of Breath     Pt presents to the ED from [facility] for increasing SOB. Pt reports he receives dialysis every Tues, Thurs, Sun but still feels as though he has excess fluid in his lungs. Pt reports he is seen in the ED occassionaly for removal of fluid from lungs.        HPI  Luis Sepulevda is a 75 y.o. male who was hospitalized yesterday secondary to shortness of breath and volume overload and seen by Dr. Brizuela in the emergency department and subsequently admitted by the hospitalist service.  He was receiving dialysis services and became unresponsive and a CODE BLUE was called.  I responded and found the patient unresponsive with bag-valve-mask ventilation in progress and a report from staff that he is a do not do CPR but okay to intubate.  Family is not available    REVIEW OF SYSTEMS  See HPI for further details. All other systems are negative.     PAST MEDICAL HISTORY  Past Medical History:   Diagnosis Date   • Arrhythmia     hx a fib   • Arthritis 12/29/2020    knees, ankles, back   • CAD (coronary artery disease)     stents   • Chronic anticoagulation 3/26/2020   • Chronic kidney disease (CKD), stage V (HCC)    • Congestive heart failure (HCC)     hx of   • Dental disorder 12/29/2020    partial upper   • Diabetes    • Hemodialysis patient (HCC)     Tuesday, Thursday, Saturday   • Hypertension    • Hypotension 11/28/2019   • Kidney failure    • Myocardial infarct (HCC)     ? 2019    • Osteoarthritis    • Peripheral neuropathy    • Pneumonia     per hx   • Sleep apnea     does not use cpap anymore       FAMILY HISTORY  Family History   Problem Relation Age of Onset   • Heart Disease Mother    • Alcohol/Drug Father    • Thyroid Son    • Diabetes Brother    • Hypertension Neg Hx    • Hyperlipidemia Neg Hx        SOCIAL HISTORY   reports that he quit smoking about 7 years ago. His smoking use included  cigarettes. He has a 55.00 pack-year smoking history. He has never used smokeless tobacco. He reports previous alcohol use. He reports that he does not use drugs.    SURGICAL HISTORY  Past Surgical History:   Procedure Laterality Date   • AV FISTULA REVISION Left 6/23/2021    Procedure: UPPER EXTREMITY, VENOGRAM;  Surgeon: John Nguyen M.D.;  Location: [facility];  Service: Vascular   • VEIN LIGATION Left 6/23/2021    Procedure: LIGATION, VEIN;  Surgeon: John Nguyen M.D.;  Location: [facility];  Service: Vascular   • CATH PLACEMENT Left 6/23/2021    Procedure: INSERTION, CATHETER -  PERMA CATH;  Surgeon: John Nguyen M.D.;  Location: [facility];  Service: Vascular   • KNEE AMPUTATION BELOW Left 5/28/2021    Procedure: AMPUTATION, BELOW KNEE.;  Surgeon: Jarett Márquez M.D.;  Location: [facility];  Service: Orthopedics   • KNEE AMPUTATION BELOW Right 12/31/2020    Procedure: AMPUTATION, BELOW KNEE ...;  Surgeon: Leobardo Lynn M.D.;  Location: [facility];  Service: Orthopedics   • TOE AMPUTATION Right 11/16/2020    Procedure: AMPUTATION, TOE GREAT;  Surgeon: Leobardo Lynn M.D.;  Location: [facility];  Service: Orthopedics   • TOE AMPUTATION Left 5/17/2020    Procedure: AMPUTATION, TOE GREAT;  Surgeon: Leobardo Lynn M.D.;  Location: [facility];  Service: Orthopedics   • TENOTOMY Left 5/17/2020    Procedure: FLEXOR TENOTOMIES, TWO-FIVE TOES;  Surgeon: Leobardo Lynn M.D.;  Location: [facility];  Service: Orthopedics   • APPENDECTOMY     • OTHER CARDIAC SURGERY      stents x1   • OTHER ORTHOPEDIC SURGERY      knee surgery       CURRENT MEDICATIONS  Home Medications     Reviewed by Sally Duque R.N. (Registered Nurse) on 08/02/21 at 1816  Med List Status: Complete   Medication Last Dose Status   acetaminophen (TYLENOL) 500 MG Tab 8/2/2021 Active   apixaban (ELIQUIS) 5mg Tab 8/2/2021 Active  "  atorvastatin (LIPITOR) 40 MG Tab 8/1/2021 Active   calcitRIOL (ROCALTROL) 0.25 MCG Cap 8/2/2021 Active   clopidogrel (PLAVIX) 75 MG Tab 8/2/2021 Active   furosemide (LASIX) 40 MG Tab 8/2/2021 Active   gabapentin (NEURONTIN) 300 MG Cap 8/2/2021 Active   guaiFENesin ER (MUCINEX) 600 MG TABLET SR 12 HR 8/2/2021 Active   insulin glargine (LANTUS SOLOSTAR) 100 UNIT/ML Solution Pen-injector injection 8/1/2021 Active   ipratropium-albuterol (DUONEB) 0.5-2.5 (3) MG/3ML nebulizer solution 8/2/2021 Active   methimazole (TAPAZOLE) 5 MG Tab 8/2/2021 Active   metoprolol SR (TOPROL XL) 25 MG TABLET SR 24 HR 7/30/2021 Active   midodrine (PROAMATINE) 5 MG Tab 8/2/2021 Active   omeprazole (PRILOSEC) 20 MG delayed-release capsule 8/2/2021 Active   sevelamer carbonate (RENVELA) 800 MG Tab tablet 8/2/2021 Active   tamsulosin (FLOMAX) 0.4 MG capsule 8/1/2021 Active   traZODone (DESYREL) 50 MG Tab 8/1/2021 Active                ALLERGIES  Allergies   Allergen Reactions   • Mircera [Methoxy Polyethylene Glycol-Epoetin Beta] Hives   • Other Drug Unspecified     \"Opioids\" caused hallucinations       PHYSICAL EXAM  VITAL SIGNS: BP (!) 94/42   Pulse 92   Temp 36.3 °C (97.3 °F) (Temporal)   Resp 18   Ht 1.702 m (5' 7\")   Wt 68.5 kg (151 lb 0.2 oz)   SpO2 96%   BMI 23.65 kg/m²    Constitutional: Elderly male who is unresponsive   HENT: Normocephalic, Atraumatic, Bilateral external ears normal, Oropharynx is clear mucous membranes are moist. No oral exudates or nasal discharge.   Eyes: Pupils are fixed and dilated at 5 mm bilaterally  Neck: No evidence of stridor or swelling  Lymphatic: No lymphadenopathy noted.   Cardiovascular: Absent heart sounds   Thorax & Lungs: Equal chest rise with bag-valve-mask ventilation  Abdomen: Soft non-tender non-distended. There is no rebound or guarding. No organomegaly is appreciated. Bowel sounds are normal.  Skin: Pallor without rash.   Back: No CVA or spinal tenderness.   Extremities: Absent distal " pulses, No edema, No tenderness, No cyanosis, No clubbing. Capillary refill is less than 2 seconds.  Musculoskeletal: No major deformities noted.   Neurologic: Unresponsive  Psychiatric: Unable to assess    EKG  Rhythm strip at the bedside revealed ventricular fibrillation    COURSE & MEDICAL DECISION MAKING  Pertinent Labs & Imaging studies reviewed. (See chart for details)  Immediately I understood that providing him with an endotracheal tube would not be helpful and we placed an LMA and continued bag-valve-mask ventilation but could not do CPR.  I attempted precordial thump x2 but there was no conversion of ventricular fibrillation that remained pulseless and the patient quickly  with a time of death of 3:23 PM.    FINAL IMPRESSION  1.  Ventricular fibrillation  2.  Cardiac arrest secondary to ventricular fibrillation  3.  End-stage renal disease  4.  Death      Electronically signed by: Chuy Locke M.D., 8/3/2021 10:09 PM

## 2021-08-04 NOTE — DISCHARGE SUMMARY
Discharge Summary    Date of Admission: 8/2/2021  Date of Death: 8/3/2021  Attending: Valdez White M.D.   Senior Resident: Dr. Finn  Discharging Intern: Dr. Navarro    CHIEF COMPLAINT ON ADMISSION  Chief Complaint   Patient presents with   • Shortness of Breath     Pt presents to the ED from [facility] for increasing SOB. Pt reports he receives dialysis every Tues, Thurs, Sun but still feels as though he has excess fluid in his lungs. Pt reports he is seen in the ED occassionaly for removal of fluid from lungs.        Reason for Admission  Acute hypoxic respiratory failure    Admission Date  8/2/2021    Date of Expiration  8/3/2021    CODE STATUS  DNAR, I OK    HPI & HOSPITAL COURSE  Luis is a 75 year old male who presented 8/2/2021 with past medical history of COPD (no inhalers at home), ZULMA, ESRD on HD (Tuesday, Thursday, and Saturday (, DM 2, CAD with stent ([facility] approximately 10 years ago; on clopidogrel, apixaban), HFrEF 30%, comes to the ED due to shortness of breath for the last 2 days.  Admitted for further evaluation of etiology for shortness of breath and volume overload. Hospital day 1, the patient was taken for hemodialysis for his regularly scheduled Tuesday session. During the course of hemodialysis, the patient developed ventricular fibrillation.  Dialysis team was notified that the patient was DNAR and after the appropriate options were exhausted, patient was pronounced dead at 3:24 PM.    DIAGNOSES AT TIME OF EXPRIATION  Principal Problem:    Ventricular fibrillation (HCC) POA: Unknown  Active Problems:    Pulmonary edema POA: Yes      Overview: 5/30/2021 chest x-ray      Complicated by acute hypoxic respiratory failure requiring 4 L nasal       cannula      From fluid overload, hemodialysis     Hyperthyroidism POA: Yes    CAD (coronary artery disease) POA: Yes      Overview: Stents to proximal and mid LAD in [facility].    Gastroesophageal reflux disease POA: Yes     Type 2 diabetes mellitus with kidney complication, with long-term current use of insulin ([facility]) POA: Yes    End stage renal disease on dialysis ([facility]) POA: Yes    Acute respiratory failure with hypoxia ([facility]) POA: Yes    Acute on chronic systolic congestive heart failure ([facility]) POA: Yes    Status post below-knee amputation of both lower extremities ([facility]) POA: Yes    Pleural effusion POA: Yes    BPH (benign prostatic hyperplasia) POA: Yes  Resolved Problems:    * No resolved hospital problems. *      MEDICATIONS     Medication List      ASK your doctor about these medications      Instructions   acetaminophen 500 MG Tabs  Commonly known as: TYLENOL   Take 2 Tablets by mouth in the morning, at noon, and at bedtime.  Dose: 1,000 mg     apixaban 5mg Tabs  Commonly known as: ELIQUIS   Take 1 tablet by mouth 2 times a day. Indications: Thromboembolism secondary to Atrial Fibrillation  Dose: 5 mg     atorvastatin 40 MG Tabs  Commonly known as: LIPITOR   Take 1 tablet by mouth at bedtime.  Dose: 40 mg     calcitRIOL 0.25 MCG Caps  Commonly known as: ROCALTROL   Take 1 capsule by mouth every day.  Dose: 0.25 mcg     clopidogrel 75 MG Tabs  Commonly known as: PLAVIX   Take 1 tablet by mouth every day.  Dose: 75 mg     furosemide 40 MG Tabs  Commonly known as: LASIX   Take 40 mg by mouth see administration instructions. Take 1 tablet (40 mg) by mouth once per day on Sundays, Mondays, Wednesdays, and Fridays only (non-dialysis days).  Dose: 40 mg     gabapentin 300 MG Caps  Commonly known as: NEURONTIN   Take 1 capsule by mouth 3 times a day as needed (Neuropathic pain).  Dose: 300 mg     guaiFENesin  MG Tb12  Commonly known as: MUCINEX  Ask about: Which instructions should I use?   Take 1 tablet by mouth 2 times a day.  Dose: 600 mg     ipratropium-albuterol 0.5-2.5 (3) MG/3ML nebulizer solution  Commonly known as: DUONEB   Take 3 mL by nebulization 2 times a day.  Dose: 3 mL     Lantus SoloStar 100 UNIT/ML Sopn  "injection  Generic drug: insulin glargine   Inject 10 Units under the skin at bedtime.  Dose: 10 Units     methimazole 5 MG Tabs  Commonly known as: TAPAZOLE   Take 1 tablet by mouth 2 times a day.  Dose: 5 mg     metoprolol SR 25 MG Tb24  Commonly known as: TOPROL XL   Take 1 tablet by mouth every day.  Dose: 25 mg     midodrine 5 MG Tabs  Commonly known as: PROAMATINE   Take 3 Tablets by mouth 3 times a day with meals.  Dose: 15 mg     omeprazole 20 MG delayed-release capsule  Commonly known as: PRILOSEC   Take 1 capsule by mouth 2 times a day.  Dose: 20 mg     sevelamer carbonate 800 MG Tabs tablet  Commonly known as: RENVELA   Take 2 Tablets by mouth 3 times a day with meals.  Dose: 1,600 mg     tamsulosin 0.4 MG capsule  Commonly known as: FLOMAX   Take 2 Capsules by mouth at bedtime.  Dose: 0.8 mg     traZODone 50 MG Tabs  Commonly known as: DESYREL   Take 50 mg by mouth at bedtime.  Dose: 50 mg            Allergies  Allergies   Allergen Reactions   • Mircera [Methoxy Polyethylene Glycol-Epoetin Beta] Hives   • Other Drug Unspecified     \"Opioids\" caused hallucinations       DIET  Orders Placed This Encounter   Procedures   • Diet Order Diet: Renal; Second Modifier: (optional): Consistent CHO (Diabetic); Fluid modifications: (optional): 1500 ml Fluid Restriction     Standing Status:   Standing     Number of Occurrences:   1     Order Specific Question:   Diet:     Answer:   Renal [8]     Order Specific Question:   Second Modifier: (optional)     Answer:   Consistent CHO (Diabetic) [4]     Order Specific Question:   Fluid modifications: (optional)     Answer:   1500 ml Fluid Restriction [9]         PROCEDURES  n/a  "

## 2021-08-04 NOTE — PROGRESS NOTES
Hemodialysis ordered by Dr. Curtis. Treatment started at 1338. Received call from monitor room pt having issue at 1516. This RN ran to bedside. Pt moaning. Sternal rubs pt not responding. No carotid pulses, CPR initiated. Code blue started. Code team arrived.  Primary RNs at bedside told this RN pt is DNR, I ok. CPR stopped. ER MD at bedside put tube in pt and bagging pt. Pt passed. Dr. Locke pronounced death at 1524. LT IJ PC flushed with NS and capped. Pt clean up and changed linens. Primary RNs brought pt up back to pts room. Dr. Curtis notified and aware. See flow sheets for details. Net  ml.

## 2021-08-10 LAB
MYCOBACTERIUM SPEC CULT: NORMAL
MYCOBACTERIUM SPEC CULT: NORMAL
RHODAMINE-AURAMINE STN SPEC: NORMAL
RHODAMINE-AURAMINE STN SPEC: NORMAL
SIGNIFICANT IND 70042: NORMAL
SIGNIFICANT IND 70042: NORMAL
SITE SITE: NORMAL
SITE SITE: NORMAL
SOURCE SOURCE: NORMAL
SOURCE SOURCE: NORMAL

## 2021-09-03 NOTE — ANESTHESIA POSTPROCEDURE EVALUATION
Patient: Luis Sepulveda    Procedure Summary     Date: 12/31/20 Room / Location: [place] [address] / SURGERY [facility]    Anesthesia Start: 1602 Anesthesia Stop:     Procedure: AMPUTATION, BELOW KNEE ... (Right Leg Lower) Diagnosis: (PAIN OF TOE OF RIGHT FOOT, DIABETIC FOOT ULCER GREAT TOE)    Surgeons: Leobardo Lynn M.D. Responsible Provider: Souleymane Salinas M.D.    Anesthesia Type: general, peripheral nerve block ASA Status: 4          Final Anesthesia Type: general, peripheral nerve block  Last vitals  BP   Blood Pressure : 107/40    Temp   37.4 °C (99.4 °F)    Pulse   Pulse: (!) 102   Resp   18    SpO2   93 %      Anesthesia Post Evaluation    Patient location during evaluation: PACU  Patient participation: complete - patient participated  Level of consciousness: awake and alert  Pain score: 1    Airway patency: patent  Anesthetic complications: no  Cardiovascular status: hemodynamically stable  Respiratory status: acceptable  Hydration status: euvolemic    PONV: none           Nurse Pain Score: 0 (NPRS)        
nephrostomy tube

## 2022-02-05 NOTE — THERAPY
"Occupational Therapy  Daily Treatment     Patient Name: Luis Sepulveda  Age:  75 y.o., Sex:  male  Medical Record #: [id number]  Today's Date: 1/21/2021     Precautions  Precautions: (P) Fall Risk, Non Weight Bearing Right Lower Extremity  Comments: (P) R BKA, R IPOP, dialysis Tues, Thurs, Sat, L arm fistula    Safety   ADL Safety : Requires Supervision for Safety  Bathroom Safety: Requires Supervision for Safety  Comments: See below notes for ADL performance details.    Subjective    \" I took a sleeping pill last night. I am having a hard time waking up .\"     Objective       01/21/21 0831   Precautions   Precautions Fall Risk;Non Weight Bearing Right Lower Extremity   Comments R BKA, R IPOP, dialysis Tues, Thurs, Sat, L arm fistula   Functional Level of Assist   Eating Independent   Upper Body Dressing Independent  (w/c level )   Lower Body Dressing Supervision  (to don right IPOP)   Toileting Supervision   Toilet Transfers Supervised   Sitting Upper Body Exercises   Chest Press 3 sets of 10;Bilateral  (weighted pulley  10 lbs )   Lat Pull 3 sets of 10;Bilateral  (weighted pulley 20 lbs )   Upper Extremity Bike Level 3 Resistance  (x 5 minutes  hydrocycyle )   Interdisciplinary Plan of Care Collaboration   Patient Position at End of Therapy Seated  (hand off to PT for tx )   OT Total Time Spent   OT Individual Total Time Spent (Mins) 60   OT Charge Group   OT Self Care / ADL 2   OT Therapeutic Exercise  2       Assessment     completed tx no complaints.  Patient reporting continued effect of sleeping pill from night prior      Strengths: Alert and oriented, Effective communication skills, Independent prior level of function, Making steady progress towards goals, Pleasant and cooperative, Supportive family, Willingly participates in therapeutic activities  Barriers: Decreased endurance, Fatigue, Impaired balance(safety awareness)    Plan  ADLs, IADLs and related functional mobility, Threshold shower transfers, " [facility] Progress Note      Subjective    Patient is seen during the hemodialysis.  Denies dizziness or headache, no chest pain or shortness of breath  Denies nausea vomiting, abd pain     Medications  Current Facility-Administered Medications   Medication Dose Route Frequency Provider Last Rate Last Admin   • meropenem (MERREM) 500 mg in sodium chloride 0.9 % 100 mL IVPB  500 mg Intravenous Q Evening Maria Del Rosario Espino MD   Completed at 02/04/22 1859   • midodrine (PROAMATINE) tablet 5 mg  5 mg Oral PRN Del Phipps MD   5 mg at 02/03/22 1628   • midodrine (PROAMATINE) tablet 5 mg  5 mg Oral Once per day on Tue Thu Sat Del Phipps MD   5 mg at 02/05/22 0854   • lactated ringers infusion   Intravenous Continuous Del Phipps MD 40 mL/hr at 02/05/22 1248 Rate Verify at 02/05/22 1248   • sodium zirconium cyclosilicate (LOKELMA) packet 10 g  10 g Oral Once Del Phipps MD       • LORazepam (ATIVAN) tablet 0.5 mg  0.5 mg Oral Q4H PRN Mason Bright MD   0.5 mg at 02/04/22 2126   • [Held by provider] calcitRIOL (ROCALTROL) capsule 0.25 mcg  0.25 mcg Oral Once per day on Tue Thu Sat Mike Curry MD   0.25 mcg at 02/01/22 1429   • sodium chloride (NORMAL SALINE) 0.9 % bolus 100-200 mL  100-200 mL Intravenous PRN Mike Curry MD       • acetaminophen (TYLENOL) tablet 650 mg  650 mg Oral 4 times per day Mason Bright MD   650 mg at 02/05/22 0621   • docusate sodium (COLACE) capsule 100 mg  100 mg Oral BID Mason Bright MD   100 mg at 02/03/22 1004   • QUEtiapine (SEROquel) tablet 100 mg  100 mg Oral Nightly Mason Bright MD   100 mg at 02/03/22 2243   • QUEtiapine (SEROquel) tablet 25 mg  25 mg Oral QAM Mason Bright MD   25 mg at 02/05/22 0621   • sodium chloride 0.9 % flush bag 25 mL  25 mL Intravenous PRN Mason Bright MD       • sodium chloride (PF) 0.9 % injection 2 mL  2 mL Intracatheter 2 times per day Mason Bright MD   2 mL  BUE/Core strength, standing mike/balance.          Occupational Therapy Goals     Problem: Dressing     Dates: Start: 01/14/21       Goal: STG-Within one week, patient will dress LB     Dates: Start: 01/14/21       Description: 1) Individualized Goal:  with Min A and AE as needed  2) Interventions:  OT Group Therapy, OT Self Care/ADL, OT Cognitive Skill Dev, OT Community Reintegration, OT Manual Ther Technique, OT Neuro Re-Ed/Balance, OT Therapeutic Activity, OT Evaluation, and OT Therapeutic Exercise          Note:     Goal Note filed on 01/18/21 1149 by Romy Collins, OT    Requires max A.                         Problem: Functional Transfers     Dates: Start: 01/14/21       Goal: STG-Within one week, patient will transfer to toilet     Dates: Start: 01/14/21       Description: 1) Individualized Goal:  with CGA and AE as needed  2) Interventions:  OT Group Therapy, OT Self Care/ADL, OT Cognitive Skill Dev, OT Community Reintegration, OT Manual Ther Technique, OT Neuro Re-Ed/Balance, OT Therapeutic Activity, OT Evaluation, and OT Therapeutic Exercise       Note:     Goal Note filed on 01/18/21 1149 by Romy Collins, OT    Min A to sba.                   Goal: STG-Within one week, patient will transfer to tub/shower     Dates: Start: 01/14/21       Description: 1) Individualized Goal:  with CGA and AE as needed  2) Interventions:  OT Group Therapy, OT Self Care/ADL, OT Cognitive Skill Dev, OT Community Reintegration, OT Manual Ther Technique, OT Neuro Re-Ed/Balance, OT Therapeutic Activity, OT Evaluation, and OT Therapeutic Exercise    Note:     Goal Note filed on 01/18/21 1149 by Romy Collins, OT    To be addressed.                         Problem: OT Long Term Goals     Dates: Start: 01/14/21       Goal: LTG-By discharge, patient will complete basic self care tasks     Dates: Start: 01/14/21       Description: 1) Individualized Goal:  with mod I and AE as needed  2) Interventions:  OT Group Therapy, OT  at 02/05/22 0854   • heparin (porcine) injection 5,000 Units  5,000 Units Subcutaneous 3 times per day Mason Bright MD   5,000 Units at 02/05/22 0621          Vitals:     Vitals with min/max:      Vital Last Value 24 Hour Range   Temperature 97.5 °F (36.4 °C) (02/05/22 1025) Temp  Min: 97.5 °F (36.4 °C)  Max: 99 °F (37.2 °C)   Pulse 86 (02/05/22 1100) Pulse  Min: 81  Max: 99   Respiratory 16 (02/05/22 1100) Resp  Min: 16  Max: 16   Non-Invasive  Blood Pressure 116/66 (02/05/22 1100) BP  Min: 88/62  Max: 137/67   Pulse Oximetry 98 % (02/05/22 1025) SpO2  Min: 95 %  Max: 98 %   Arterial   Blood Pressure   No data recorded         Physical Exam  Physical Exam  Vitals and nursing note reviewed.   Constitutional:       Appearance: Normal appearance. He is ill-appearing.   HENT:      Head: Normocephalic and atraumatic.      Nose: Nose normal.      Neck: Normal range of motion and neck supple.   Eyes:      Extraocular Movements: Extraocular movements intact.      Conjunctiva/sclera: Conjunctivae normal.   Neck:      Vascular: No carotid bruit.   Cardiovascular:      Rate and Rhythm: Normal rate and regular rhythm.      Pulses: Normal pulses.      Heart sounds: Normal heart sounds.   Pulmonary:      Effort: Pulmonary effort is normal.      Breath sounds: Normal breath sounds. No wheezing or rhonchi.   Abdominal:      General: Abdomen is flat. There is no distension.      Palpations: Abdomen is soft.      Tenderness: There is no abdominal tenderness.   Musculoskeletal:         General: No swelling.      Right lower leg: No edema.      Left lower leg: No edema.   Skin:     General: Skin is warm and dry.      Findings: No rash.   Neurological:      General: No focal deficit present.      Mental Status: He is alert.      Cranial Nerves: No cranial nerve deficit.   Psychiatric:         Mood and Affect: Mood normal.         Behavior: Behavior normal.           Imaging  No results found.      Labs     Recent Labs   Lab  02/05/22  0544 02/04/22  1044 02/04/22  0534 02/03/22  0553 02/03/22  0543   WBC 6.2  --  10.6 15.4*  --    HCT 35.1*  --  38.0* 43.7  --    HGB 11.4*  --  12.5* 14.1  --    *  --  153 156  --    INR  --  1.1  --   --   --    SODIUM 138  --  135  --  138   POTASSIUM 3.5  --  3.6  --  5.6*   CHLORIDE 103  --  100  --  104   CO2 28  --  25  --  22   CALCIUM 7.6*  --  7.5*  --  8.9   GLUCOSE 69*  --  69*  --  88   BUN 40*  --  25*  --  46*   CREATININE 8.35*  --  5.63*  --  9.44*   *  --  284*  --  666*   *  --  450*  --  612*   ALKPT 122*  --  123*  --  123*   BILIRUBIN 1.7*  --  1.5*  --  2.0*   ALBUMIN 2.2*  --  2.5*  --  2.7*   PHOS 4.4  --  3.8  --  6.5*         Assessment and Plan:    Elevated LFTs, multifactorial, including hypotension, possible drug-induced liver injury, possible cholestasis from sepsis and/or COVID, possible muscle injury with hx of fall   US biliary tree (2/3): No evidence of acute cholecystitis. No biliary ductal dilatation. No focal sonographic abnormality of liver on partially limited evaluation.  Hepatitis B surface antibody positive, hepatitis B surface antigen negative indicating immunity to hepatitis B infection   (2/5)   - Total bili 1.5 -> 1.7   -  -> 137   -  -> 278   - Alk phos 123 -> 122 (2/5)   - Zosyn stopped on 2/4/2022 for concern of drug induced liver injury   - Continue IV meropenem (2/4-TBD) as per ID   - Supportive care: Tylenol, bowel regimen   - Optimize volume status and avoid hypotension   - Trend LFTs   - ID following   - GI following, no role for repeat imaging. F/u aldolase, AMA, RUY, ASMA    ESRD on HD  CT A/P (2/2): Bilateral renal cortical atrophy   - Continue HD per renal   - Midodrine pre-HD and as needed per renal   - Renal following    Acute metabolic encephalopathy  History of dementia with behavioral features   Agitation    - Continue Seroquel 25 mg every morning and 100 mg at bedtime   - Continue PRN PO Ativan   -  Self Care/ADL, OT Cognitive Skill Dev, OT Community Reintegration, OT Manual Ther Technique, OT Neuro Re-Ed/Balance, OT Therapeutic Activity, OT Evaluation, and OT Therapeutic Exercise      Note:     Goal Note filed on 01/14/21 1519 by Zeina Taylor MS,OTR/L    1) Individualized Goal:  with mod I and AE as needed  2) Interventions:  OT Group Therapy, OT Self Care/ADL, OT Cognitive Skill Dev, OT Community Reintegration, OT Manual Ther Technique, OT Neuro Re-Ed/Balance, OT Therapeutic Activity, OT Evaluation, and OT Therapeutic Exercise                   Goal: LTG-By discharge, patient will perform bathroom transfers     Dates: Start: 01/14/21       Description: 1) Individualized Goal:  with mod I and AE as needed  2) Interventions:  OT Group Therapy, OT Self Care/ADL, OT Cognitive Skill Dev, OT Community Reintegration, OT Manual Ther Technique, OT Neuro Re-Ed/Balance, OT Therapeutic Activity, OT Evaluation, and OT Therapeutic Exercise       Note:     Goal Note filed on 01/14/21 1519 by Zeina Taylor MS,OTR/L    1) Individualized Goal:  with mod I and AE as needed  2) Interventions:  OT Group Therapy, OT Self Care/ADL, OT Cognitive Skill Dev, OT Community Reintegration, OT Manual Ther Technique, OT Neuro Re-Ed/Balance, OT Therapeutic Activity, OT Evaluation, and OT Therapeutic Exercise                              Continue to monitor mentation    Generalized weakness   Recurrent falls  History of small bilateral frontal SAH a/w above, resolved   - Continue supportive care    Acute on chronic hypotension likely due to volume depleted status and sepsis  RRT for decreased responsiveness and hypotension. SBP was in the mid 60s, received IV fluids   - SBP 80s-120s last 24 hours   - Continue LR@40mL/hr   - Continue midodrine as above   - Maintain 1.5L/day oral FR per renal   - Renal following    Hypoglycemia - Glucose 69 this morning. Continue to monitor    Lactic acidosis + resp alkalosis - Continue IV fluids as above. Renal following    2 sets of blood cultures with corynebacterium imitans: suspect contaminants    - ID following     COVID-19 infection (01/13/22) asymptomatic   CXR (2/2): No radiographically evident acute cardiopulmonary process.   (2/5)   - On room air   - Isolation no longer necessary   - Continue to monitor    Renal SHPT   - Renal following, calcium running on high side. Hold Calcitriol 0.25 mcg TTS    Anemia of chronic disease - Hb 12.5 --> 11.4 (2/5); monitor    Thrombocytopenia -  (2/5); monitor    Abnormal  findings on CT A/P  CT A/P (2/2): Hyperdense nodule along the left lateral urinary bladder wall raising  the possibility of bladder neoplasm. Correlate with cystoscopy. Circumferential urinary bladder wall thickening may be due to underdistention/chronic outlet obstruction the setting of prostatomegaly. Correlate with urinalysis to exclude cystitis.   - Monitor      DVT PROPHYLAXIS: SCDs, SQH    Code Status: Full Resuscitation    Disposition: Pending clinical improvement     Primary Care Physician  Otf Flores MD        All patient questions answered  All labs and imaging reviewed    Charting performed by sushma Franklin for Dr. Karime Cuellar.      All medical record entries made by the scribe were at my direction. I have reviewed the chart  and agree that the record  accurately reflects my personal performance of the history, physical  exam, hospital course, and assessment and plan.

## 2022-04-29 NOTE — ASSESSMENT & PLAN NOTE
Continue Lantus 5 units at bedtime diabetic diet   PATIENT REQUESTED A REFILL ON:diclofenac (VOLTAREN) 75 MG EC tablet    PATIENT CAN BE REACHED ON:[phone]     [facility] [address] - [phone] PH - [phone]   [phone]

## 2022-07-15 NOTE — PROGRESS NOTES
Addended by: Jasmyn Sanderson on: 7/15/2022 10:11 AM     Modules accepted: Orders, Level of Service HD treatment today per routine order.Treatment tolerated well with minimal UF removal of 500 ml.CVC dressing changed and locked with heparin.Report given to primary Rn.

## 2022-12-19 NOTE — CARE PLAN
Problem: Communication  Goal: The ability to communicate needs accurately and effectively will improve  Outcome: PROGRESSING AS EXPECTED     Problem: Safety  Goal: Will remain free from injury  Outcome: PROGRESSING AS EXPECTED  Goal: Will remain free from falls  Outcome: PROGRESSING AS EXPECTED     
  Problem: Safety  Goal: Will remain free from falls  Outcome: PROGRESSING AS EXPECTED  Intervention: Implement fall precautions  Flowsheets  Taken 12/11/2019 0841  Bed Alarm: Alarm Not On  Taken 12/11/2019 0800  Environmental Precautions: Treaded Slipper Socks on Patient;Personal Belongings, Wastebasket, Call Bell etc. in Easy Reach;Transferred to Stronger Side;Report Given to Other Health Care Providers Regarding Fall Risk;Bed in Low Position;Communication Sign for Patients & Families;Mobility Assessed & Appropriate Sign Placed  Note:   Bed in lowest position, wheels locked. Call bell within reach, calls appropriately for assistance. Fall prevention education provided, verbalized understanding. Hourly rounding. Fall/safety precautions in place. Pt remains free from fall/injury at this time.       Problem: Venous Thromboembolism (VTW)/Deep Vein Thrombosis (DVT) Prevention:  Goal: Patient will participate in Venous Thrombosis (VTE)/Deep Vein Thrombosis (DVT)Prevention Measures  Outcome: PROGRESSING AS EXPECTED  Intervention: Assess and monitor for anticoagulation complications  Note:   Eliquis as ordered, education provided, verbalized understanding. Pt ambulatory. Remains free s/s VTE/DVT     
No

## 2023-09-08 NOTE — THERAPY
Missed Therapy     Patient Name: Luis Sepulveda  Age:  75 y.o., Sex:  male  Medical Record #: [id number]  Today's Date: 1/27/2021    Discussed missed therapy with Dr. Goodman       01/27/21 1501   Therapy Missed   Missed Therapy (Minutes) 60   Reason For Missed Therapy Medical - Patient on Hold from Therapy  (N/V)      None

## 2024-04-29 NOTE — ASSESSMENT & PLAN NOTE
Hx of multiple amputations    Sridevi Centeno is followed at [facility] for their history of VTE.     They were last evaluated by our team on 11/14/23 with the plan to remain on long term anticoagulation and return to clinic in 1 year.    Prescription updated and refills given.     This message will not be routed to  for scheduling.  Patient scheduled to see Emery Schwartz 5/14/24.  Renetta Morocho, MSN, RN, PHN -Nurse Clinician, [facility] [phone]

## 2025-01-18 NOTE — PROGRESS NOTES
Monitor Summary:    Rhythm: SR  Rate: 84-91  Ectopy: none  Intervals: .14/.08/.44     Writer left message for patient to return call. Please convey results and provider's directions below.     ----- Message from Faith Brumfield PA-C sent at 1/17/2025  7:43 PM CST -----  Please notify patient of urine culture which shows staph.  This could be a contaminant from skin nichole.  If she still has any urinary symptoms I would take the antibiotic which was sent to her pharmacy.  It looks like she was going to be waiting for the culture result before taking the antibiotic.  If her symptoms have completely resolved thenantibiotics not necessary.  Please notify provider if patient has any specific questions.

## 2025-01-24 NOTE — ANESTHESIA QCDR

## 2025-05-20 NOTE — PROGRESS NOTES
Bladder scan showed 947 ml in patients bladder. Tomas was placed per order and 700 mls was emptied into the drainage bag.   no

## 2025-06-10 NOTE — DISCHARGE PLANNING
Per Alanna at [facility], they will pick patient up tomorrow, 1/27 a 12:00noon.  Dr. Cherry is accepting physician.  CM notified.   How Many Mls Were Removed From The 40 Mg/Ml (1ml) Vial When Preparing The Injectable Solution?: 0 Bill For Wasted Drug (Kenalog)?: no Detail Level: Detailed Lot # For Kenalog (Optional): [id number] Administered By (Optional): Sherri Mejía PA-C Validate Note Data When Using Inventory: Yes Medical Necessity Clause: This procedure was medically necessary because the lesions that were treated were: Kenalog Type Of Vial: Multiple Dose Total Volume (Ccs): 0.7 Concentration Of Kenalog Solution Injected (Mg/Ml): 10.0 Consent: The risks of atrophy were reviewed with the patient. Treatment Number (Optional): 1 Kenalog Preparation: Kenalog Expiration Date For Kenalog (Optional): 05/2027

## 2025-07-22 NOTE — CONSULTS
Chief Complaint   Patient presents with   • Slurred Speech       Problem List Items Addressed This Visit     None      Neurology Stroke Alert Consultation     History of present illness:  This is a 73-year old male with PMHx significant for hypertension, type II DM, CAD, Afib on Eliquis (last dose this morning), recent NSTEMI (was here on 11/18/19), and CKD with recent initiation of hemodialysis (Last HD yesterday, T/TH/Sat) who presented to [facility] on 11/22/19 for a chief complaint of slurred speech. Patient states that he awoke in his usual state of health this morning; around 0930, patient noted generalized weakness, was unable to get dress independently; also noted to have significant slurred speech. EMS was thus called by his son; on scene, -150s. In Afib, normal rate. BG WNL. At time of presentation here, CT head with no acute intracranial abnormality; CTA with no LVO. NIHSS 1. Patient ultimately determined not to be a candidate for IV tPA secondary to concurrent Eliquis use; also with mild/non disabling deficits.   Currently, patient is sitting up in stretcher; awake and alert. Still with mild dysarthria, to which patient admits is not baseline. Denies headache, admits to mild light headedness and generalized weakness. Denies focal weakness, numbness or paresthesia. Denies new problem with vision or problem swallowing.     Neurology has been consulted by Dr. Brandon Salcedo to further evaluate the findings noted above.    Past medical history:   Past Medical History:   Diagnosis Date   • CAD (coronary artery disease)     stents   • Chronic kidney disease (CKD), stage V (HCC)    • Diabetes    • Hypertension    • Osteoarthritis    • Peripheral neuropathy        Past surgical history:   Past Surgical History:   Procedure Laterality Date   • APPENDECTOMY     • OTHER CARDIAC SURGERY      stents   • OTHER ORTHOPEDIC SURGERY      knee surgery       Family history:   Family History   Problem Relation  Spoke to patient and verified dosage of Spironolactone 25mg daily.    New Rx placed and routed to Dr. Carodna to sign.   Age of Onset   • Heart Disease Mother    • Alcohol/Drug Father    • Thyroid Son    • Diabetes Brother    • Hypertension Neg Hx    • Hyperlipidemia Neg Hx        Social history:   Social History     Socioeconomic History   • Marital status:      Spouse name: Not on file   • Number of children: Not on file   • Years of education: Not on file   • Highest education level: Not on file   Occupational History   • Not on file   Social Needs   • Financial resource strain: Not on file   • Food insecurity:     Worry: Not on file     Inability: Not on file   • Transportation needs:     Medical: Not on file     Non-medical: Not on file   Tobacco Use   • Smoking status: Former Smoker     Packs/day: 1.00     Years: 55.00     Pack years: 55.00     Last attempt to quit: 2014     Years since quittin.8   • Smokeless tobacco: Never Used   Substance and Sexual Activity   • Alcohol use: No   • Drug use: No   • Sexual activity: Not on file   Lifestyle   • Physical activity:     Days per week: Not on file     Minutes per session: Not on file   • Stress: Not on file   Relationships   • Social connections:     Talks on phone: Not on file     Gets together: Not on file     Attends [employer] service: Not on file     Active member of club or organization: Not on file     Attends meetings of clubs or organizations: Not on file     Relationship status: Not on file   • Intimate partner violence:     Fear of current or ex partner: Not on file     Emotionally abused: Not on file     Physically abused: Not on file     Forced sexual activity: Not on file   Other Topics Concern   • Not on file   Social History Narrative   • Not on file       Current medications:   No current facility-administered medications for this encounter.      Current Outpatient Medications   Medication   • atorvastatin (LIPITOR) 80 MG tablet   • calcium acetate (PHOS-LO) 667 MG Tab tablet   • calcitRIOL (ROCALTROL) 0.25 MCG Cap   • nitroglycerin (NITROSTAT) 0.4  MG SL Tab   • insulin glargine (LANTUS SOLOSTAR) 100 UNIT/ML Solution Pen-injector injection   • apixaban (ELIQUIS) 5mg Tab   • clopidogrel (PLAVIX) 75 MG Tab   • metoprolol (LOPRESSOR) 100 MG Tab   • methimazole (TAPAZOLE) 5 MG Tab   • furosemide (LASIX) 40 MG Tab   • acetaminophen (TYLENOL) 500 MG Tab   • pantoprazole (PROTONIX) 40 MG Tablet Delayed Response       Medication Allergy:  No Known Allergies    Review of systems:   Constitutional: denies fever, night sweats, weight loss.   Eyes: denies acute vision change, eye pain or secretion.   Ears, Nose, Mouth, Throat: denies nasal secretion, nasal bleeding, difficulty swallowing, hearing loss, tinnitus, vertigo, ear pain, acute dental problems, oral ulcers or lesions.   Endocrine: denies recent weight changes, heat or cold intolerance, polyuria, polydypsia, polyphagia,abnormal hair growth.  Cardiovascular: denies new onset of chest pain, palpitations, syncope, or dyspnea of exertion.  Pulmonary: denies shortness of breath, new onset of cough, hemoptysis, wheezing, chest pain or flu-like symptoms.   GI: denies nausea, vomiting, diarrhea, GI bleeding, change in appetite, abdominal pain, and change in bowel habits.  : denies dysuria, urinary incontinence, hematuria.  Heme/oncology: denies history of easy bruising or bleeding. No history of cancer, DVTor PE.  Allergy/immunology: denies hives/urticaria, or itching.   Dermatologic: denies new rash, or new skin lesions.  Musculoskeletal:denies joint swelling or pain, muscle pain, neck and back pain.   Neurologic: As noted above.  Psychiatric: denies symptoms of depression, anxiety, hallucinations, mood swings or changes, suicidal or homicidal thoughts.     Physical examination:   Vitals:    11/22/19 1107 11/22/19 1115   Pulse:  80   SpO2:  100%   Weight: 72.3 kg (159 lb 6.3 oz)      General: Patient in no acute distress, pleasant and cooperative.  HEENT: Normocephalic, no signs of acute trauma.   Neck: supple, no  meningeal signs or carotid bruits. There is normal range of motion. No tenderness on exam.   Chest: clear to auscultation. No cough.   CV: RRR, no murmurs.   Skin: no signs of acute rashes or trauma.   Musculoskeletal: joints exhibit full range of motion, without any pain to palpation. There are no signs of joint or muscle swelling. There is no tenderness to deep palpation of muscles.   Psychiatric: No hallucinatory behavior. Denies symptoms of depression or suicidal ideation. Mood and affect appear normal on exam.     NEUROLOGICAL EXAM:   Mental status, orientation: Awake, alert and fully oriented.   Speech and language: speech is fluent, mildly dysarthric. The patient is able to name, repeat and comprehend.   Memory: There is intact recollection of recent and remote events.   Cranial nerve exam: Pupils are 3-4 mm bilaterally and equally reactive to light. Visual fields are intact by confrontation. There is no nystagmus on primary or secondary gaze. Intact full EOM in all directions of gaze. Face appears symmetric. Sensation in the face is intact to light touch. Tongue is midline and without any signs of tongue biting or fasciculations.  Motor exam: Strength is 5/5 in all extremities. Tone is normal. No abnormal movements were seen on exam.   Sensory exam reveals normal sense of light touch and pinprick in all extremities.   Deep tendon reflexes:  2+ throughout. Plantar responses are flexor. There is no clonus.   Coordination: shows a normal finger-nose-finger. Normal rapidly alternating movements.   Gait: Not assessed at this time as patient is a fall risk.       NIH Stroke Scale    1a Level of Consciousness   1b Orientation Questions   1c Response to Commands   2 Gaze   3 Visual Fields   4 Facial Movement   5 Motor Function (arm)   a Left   b Right   6 Motor Function (leg)   a Left   b Right   7 Limb Ataxia   8 Sensory   9 Language   10 Articulation 1  11 Extinction/Inattention     Score: 1      ANCILLARY DATA  REVIEWED:     Lab Data Review:  Recent Results (from the past 24 hour(s))   CBC WITH DIFFERENTIAL    Collection Time: 11/22/19 10:53 AM   Result Value Ref Range    WBC 7.8 4.8 - 10.8 K/uL    RBC 2.61 (L) 4.70 - 6.10 M/uL    Hemoglobin 8.0 (L) 14.0 - 18.0 g/dL    Hematocrit 24.3 (L) 42.0 - 52.0 %    MCV 93.1 81.4 - 97.8 fL    MCH 30.7 27.0 - 33.0 pg    MCHC 32.9 (L) 33.7 - 35.3 g/dL    RDW 44.3 35.9 - 50.0 fL    Platelet Count 224 164 - 446 K/uL    MPV 10.3 9.0 - 12.9 fL    Neutrophils-Polys 69.50 44.00 - 72.00 %    Lymphocytes 15.80 (L) 22.00 - 41.00 %    Monocytes 13.30 0.00 - 13.40 %    Eosinophils 0.40 0.00 - 6.90 %    Basophils 0.40 0.00 - 1.80 %    Immature Granulocytes 0.60 0.00 - 0.90 %    Nucleated RBC 0.00 /100 WBC    Neutrophils (Absolute) 5.42 1.82 - 7.42 K/uL    Lymphs (Absolute) 1.23 1.00 - 4.80 K/uL    Monos (Absolute) 1.04 (H) 0.00 - 0.85 K/uL    Eos (Absolute) 0.03 0.00 - 0.51 K/uL    Baso (Absolute) 0.03 0.00 - 0.12 K/uL    Immature Granulocytes (abs) 0.05 0.00 - 0.11 K/uL    NRBC (Absolute) 0.00 K/uL   COMP METABOLIC PANEL    Collection Time: 11/22/19 10:53 AM   Result Value Ref Range    Sodium 139 135 - 145 mmol/L    Potassium 3.8 3.6 - 5.5 mmol/L    Chloride 101 96 - 112 mmol/L    Co2 29 20 - 33 mmol/L    Anion Gap 9.0 0.0 - 11.9    Glucose 105 (H) 65 - 99 mg/dL    Bun 17 8 - 22 mg/dL    Creatinine 2.91 (H) 0.50 - 1.40 mg/dL    Calcium 8.0 (L) 8.5 - 10.5 mg/dL    AST(SGOT) 15 12 - 45 U/L    ALT(SGPT) 14 2 - 50 U/L    Alkaline Phosphatase 126 (H) 30 - 99 U/L    Total Bilirubin 0.6 0.1 - 1.5 mg/dL    Albumin 3.1 (L) 3.2 - 4.9 g/dL    Total Protein 5.5 (L) 6.0 - 8.2 g/dL    Globulin 2.4 1.9 - 3.5 g/dL    A-G Ratio 1.3 g/dL   PROTHROMBIN TIME    Collection Time: 11/22/19 10:53 AM   Result Value Ref Range    PT 18.2 (H) 12.0 - 14.6 sec    INR 1.47 (H) 0.87 - 1.13   APTT    Collection Time: 11/22/19 10:53 AM   Result Value Ref Range    APTT 33.5 24.7 - 36.0 sec   TROPONIN    Collection Time: 11/22/19  10:53 AM   Result Value Ref Range    Troponin T 297 (H) 6 - 19 ng/L   ESTIMATED GFR    Collection Time: 19 10:53 AM   Result Value Ref Range    GFR If  26 (A) >60 mL/min/1.73 m 2    GFR If Non African American 21 (A) >60 mL/min/1.73 m 2   EKG (NOW)    Collection Time: 19 11:08 AM   Result Value Ref Range    Report       [facility]    Test Date:  2019  Pt Name:    KLARISSA BRADSHAW              Department: ER  MRN:        [id number]                      Room:        05  Gender:     Male                         Technician: [id number]  :        1945                   Requested By:GIANNI SUAREZ  Order #:    [id number]                    Reading MD:    Measurements  Intervals                                Axis  Rate:       79                           P:          62  MA:         196                          QRS:        -63  QRSD:       136                          T:          176  QT:         408  QTc:        468    Interpretive Statements  SINUS RHYTHM  RBBB AND LAFB  Compared to ECG 2019 10:04:46  Atrial abnormality no longer present         Labs reviewed by me.       Imaging reviewed by me:     CT-CTA HEAD WITH & W/O-POST PROCESS   Final Result      CT angiogram of the [place] within normal limits. No large vessel occlusions identified.   No acute intracranial hemorrhage.      CT-CTA NECK WITH & W/O-POST PROCESSING   Final Result      1.  Mild bilateral common carotid artery stenosis with noncalcified plaque present, less than 50% luminal narrowing.   2.  No focal high-grade stenosis, dissection or occlusion of the cervical carotid or vertebral arteries.   3.  Small RIGHT apical lung nodule, nonspecific.      CT-CEREBRAL PERFUSION ANALYSIS   Final Result      1.  Cerebral blood flow less than 30% likely representing completed infarct = 0 mL.      2.  T Max more than 6 seconds likely representing combination of completed infarct and  ischemia = 0 mL.      3.  Mismatched volume likely representing ischemic brain/penumbra = None      4.  Please note that the cerebral perfusion was performed on the limited brain tissue around the basal ganglia region. Infarct/ischemia outside the CT perfusion sections can be missed in this study.      CT-HEAD W/O   Final Result      1.  Cerebral atrophy.      2.  White matter lucencies most consistent with small vessel ischemic change versus demyelination or gliosis.      3.  Otherwise, Head CT without contrast with no acute findings. No evidence of acute cerebral hemorrhage or mass lesion.      DX-CHEST-PORTABLE (1 VIEW)    (Results Pending)       Presumed mechanism by TOAST:  __Large Artery Atherosclerosis  __Small Vessel (Lacunar)  _X_Cardioembolic  __Other (Sickle Cell, Vasculitis, Hypercoagulable)  __Unknown      ASSESSMENT AND PLAN:  73-year old male with PMHx significant for hypertension, type II DM, CAD, Afib on Eliquis (last dose this morning), recent NSTEMI (was here on 11/18/19), and CKD with recent initiation of hemodialysis (Last HD yesterday, T/TH/Sat) who presented to [facility] on 11/22/19 for a chief complaint of slurred speech and generalized weakness, onset 0930 this morning. -150s, BG WNL. CT head, CTA head/neck without acute intracranial abnormality or LVO respectively. Patient ultimately determined not to be a candidate for IV tPA secondary to concurrent use of Eliquis, also given mild/non disabling deficits and NIHSS 1. Differential diagnoses include TIA vs small acute stroke; may also consider recrudesence of old stroke symptoms (note chronic appearing infarcts per CT) or a toxic/metaoblic process contributing to patient's symptoms.     Recommendations/Plan:     -q4h and PRN neuro assessment. VS per nursing/unit protocol. BP goal < 140/90; if MRI shows stroke, may then pursue permissive hypertension x 24 hours.   -Obtain MRI Brain wo contrast.   -Telemetry; currently SR/Afib  intermittently. Note TTE fro 11/12/19 with EF 50%; ?tricuspid insufficiency, no other gross structural abnormalities. For now, may continue home dose of Eliquis. Per patient's home med rec, patient takes AC and antiplatelet; will review cardiology notes.   -Continue Atorvastatin 80 mg PO q HS. Check Lipid panel.   -Check UA, UDS, Ammonia, B12, TSH, CXR, to rule out potential toxic/metaoblic/infectious causes of patient's symptoms.   -Recommend aggressive BG management per primary team. Avoid IVF with Dextrose. BG goal 140-180. Note hemoglobin A1c from 9/2019 5.3.   -PT/OT/SLP eval and treat.   -Will follow up with results of the above and make additional recs accordingly.   -DVT PPX: SCDs.     The plan of care above has been discussed with Dr. Portillo.     Rema Sykes MSN, BSN, AGNP-C  [facility]

## (undated) DEVICE — CLIP LG INTNL HRZN TI ESCP LGT - (20/BX)

## (undated) DEVICE — SLEEVE, VASO, THIGH, MED

## (undated) DEVICE — GLOVE BIOGEL PI INDICATOR SZ 8.0 SURGICAL PF LF -(50/BX 4BX/CA)

## (undated) DEVICE — DRESSING 3X8 ADAPTIC GAUZE - NON-ADHERING (36/PK 6PK/BX)

## (undated) DEVICE — PACK LOWER EXTREMITY - (2/CA)

## (undated) DEVICE — SUTURE 4-0 MONOCRYL PLUS PS-1 - 27 INCH (36/BX)

## (undated) DEVICE — CANISTER SUCTION 3000ML MECHANICAL FILTER AUTO SHUTOFF MEDI-VAC NONSTERILE LF DISP  (40EA/CA)

## (undated) DEVICE — NEPTUNE 4 PORT MANIFOLD - (20/PK)

## (undated) DEVICE — KIT ANESTHESIA W/CIRCUIT & 3/LT BAG W/FILTER (20EA/CA)

## (undated) DEVICE — ELECTRODE DUAL RETURN W/ CORD - (50/PK)

## (undated) DEVICE — SHEET THYROID - (10EA/CA)

## (undated) DEVICE — PAD LAP STERILE 18 X 18 - (5/PK 40PK/CA)

## (undated) DEVICE — CUP DENTURE W/ LID - (200/CA)

## (undated) DEVICE — SODIUM CHL IRRIGATION 0.9% 1000ML (12EA/CA)

## (undated) DEVICE — GELAQUASONIC 100 ULTRASOUND - 48/BX 20GM STERILE FOIL POUCH

## (undated) DEVICE — GLOVE BIOGEL PI ORTHO SZ 6 SURGICAL PF LF (40PR/BX)

## (undated) DEVICE — WATER IRRIGATION STERILE 1000ML (12EA/CA)

## (undated) DEVICE — BLADE SURGICAL #11 - (50/BX)

## (undated) DEVICE — KIT SURGIFLO W/OUT THROMBIN - (6EA/CA)

## (undated) DEVICE — SUTURE 4-0 ETHILON FS-1 18 (36PK/BX)"

## (undated) DEVICE — SUCTION INSTRUMENT YANKAUER BULBOUS TIP W/O VENT (50EA/CA)

## (undated) DEVICE — CONTAINER SPECIMEN BAG OR - STERILE 4 OZ W/LID (100EA/CA)

## (undated) DEVICE — BANDAGE ELASTIC 4 HONEYCOMB - 4"X5YD LF (20/CA)"

## (undated) DEVICE — DRESSING ABDOMINAL PAD STERILE 8 X 10" (360EA/CA)"

## (undated) DEVICE — GOWN WARMING STANDARD FLEX - (30/CA)

## (undated) DEVICE — PADDING CAST 6 IN STERILE - 6 X 4 YDS (24/CA)

## (undated) DEVICE — SUTURE GENERAL

## (undated) DEVICE — GLOVE BIOGEL PI INDICATOR SZ 7.0 SURGICAL PF LF - (50/BX 4BX/CA)

## (undated) DEVICE — MASK ANESTHESIA ADULT  - (100/CA)

## (undated) DEVICE — PROTECTOR ULNA NERVE - (36PR/CA)

## (undated) DEVICE — GLOVE BIOGEL INDICATOR SZ 6.5 SURGICAL PF LTX - (50PR/BX 4BX/CA)

## (undated) DEVICE — GLOVE BIOGEL ECLIPSE PF LATEX SIZE 8.0  (50PR/BX)

## (undated) DEVICE — SENSOR SPO2 NEO LNCS ADHESIVE (20/BX) SEE USER NOTES

## (undated) DEVICE — SLEEVE VASO CALF MED - (10PR/CA)

## (undated) DEVICE — PADDING CAST 4 IN STERILE - 4 X 4 YDS (24/CA)

## (undated) DEVICE — SYRINGE 20 ML LL (50EA/BX 4BX/CA)

## (undated) DEVICE — GOWN SURGEONS LARGE - (32/CA)

## (undated) DEVICE — DRAPE 36X28IN RAD CARM BND BG - (25/CA) O

## (undated) DEVICE — SET EXTENSION WITH 2 PORTS (48EA/CA) ***PART #2C8610 IS A SUBSTITUTE*****

## (undated) DEVICE — GOWN SURGEONS X-LARGE - DISP. (30/CA)

## (undated) DEVICE — HEAD HOLDER JUNIOR/ADULT

## (undated) DEVICE — SUTURE 2-0 VICRYL PLUS CT-1 - 8 X 18 INCH(12/BX)

## (undated) DEVICE — TUBING CLEARLINK DUO-VENT - C-FLO (48EA/CA)

## (undated) DEVICE — GLOVE BIOGEL ECLIPSE  PF LATEX SIZE 6.5 (50PR/BX)

## (undated) DEVICE — SET LEADWIRE 5 LEAD BEDSIDE DISPOSABLE ECG (1SET OF 5/EA)

## (undated) DEVICE — CHLORAPREP 26 ML APPLICATOR - ORANGE TINT(25/CA)

## (undated) DEVICE — SUTURE 2-0 SILK SH C/R ETHICON (12PK/BX)

## (undated) DEVICE — TOWELS CLOTH SURGICAL - (4/PK 20PK/CA)

## (undated) DEVICE — SPLINT PLASTER 5 IN X 30 IN - (50EA/BX 6BX/CA)

## (undated) DEVICE — SUTURE 3-0 VICRYL PLUS - 12 X 18 INCH (12/BX)

## (undated) DEVICE — CLOSURE SKIN STRIP 1/2 X 4 IN - (STERI STRIP) (50/BX 4BX/CA)

## (undated) DEVICE — BLADE SURGICAL #15 - (50/BX 3BX/CA)

## (undated) DEVICE — ELECTRODE 850 FOAM ADHESIVE - HYDROGEL RADIOTRNSPRNT (50/PK)

## (undated) DEVICE — STOCKINET BIAS 6 IN STERILE - (20/CA)

## (undated) DEVICE — SUTURE 6-0 PROLENE BV-1 D/A 24 (36PK/BX)"

## (undated) DEVICE — BANDAGE ELASTIC 6 HONEYCOMB - 6X5YD LF (20/CA)"

## (undated) DEVICE — DISPOSABLE WOUND VAC PICO 10 X 30 CM - WOUND CARE (3/CA)

## (undated) DEVICE — BLADE SURGICAL CLIPPER - (50EA/CA)

## (undated) DEVICE — KIT EVACUATER 3 SPRING PVC LF 1/8 DRAIN SIZE (10EA/CA)"

## (undated) DEVICE — SET INTRO MIRCROPUNCTURE - MPIS-501-SST

## (undated) DEVICE — DRESSING ANTIMICROBIAL BIOPATCH 1.5MM (40EA/CA)

## (undated) DEVICE — BOVIE BLADE COATED - (50/PK)

## (undated) DEVICE — Device

## (undated) DEVICE — SUTURE ETHILON 2-0 FSLX 30 (36PK/BX)"

## (undated) DEVICE — SUTURE 2-0 SILK 12 X 18" (36PK/BX)"

## (undated) DEVICE — BLADE SURGICAL #10 - (50/BX)

## (undated) DEVICE — DRAPE LOWER EXTREMETY - (6/CA)

## (undated) DEVICE — SOD. CHL. INJ. 0.9% 250 ML - (36/CA 50CA/PF)

## (undated) DEVICE — PACK AV FISTULA (4EA/CA)

## (undated) DEVICE — LACTATED RINGERS INJ 1000 ML - (14EA/CA 60CA/PF)

## (undated) DEVICE — SUTURE 3-0 ETHILON PS-1 (36PK/BX)

## (undated) DEVICE — GLOVE SZ 7 BIOGEL PI MICRO - PF LF (50PR/BX 4BX/CA)

## (undated) DEVICE — TOWEL STOP TIMEOUT SAFETY FLAG (40EA/CA)

## (undated) DEVICE — SUTURE 3-0 VICRYL PLUS SH - 8X 18 INCH (12/BX)

## (undated) DEVICE — DRAPE IOBAN II INCISE 23X17 - (10EA/BX 4BX/CA)

## (undated) DEVICE — DRAPE C-ARM LARGE 41IN X 74 IN - (10/BX 2BX/CA)

## (undated) DEVICE — SUTURE 4-0 SILK 12 X 18 INCH - (36/BX)

## (undated) DEVICE — DECANTER FLD BLS - (50/CA)

## (undated) DEVICE — SUTURE 2-0 SILK SH 30 IN C/R (12PK/BX)

## (undated) DEVICE — DISPOSABLE WOUND VAC PICO 10 X 20 CM - WOUND CARE (3/CA)

## (undated) DEVICE — SUTURE 4-0 30CM STRATAFIX SPIRAL PS-2 (12EA/BX)

## (undated) DEVICE — PAD PREP 24 X 48 CUFFED - (100/CA)

## (undated) DEVICE — GLOVE BIOGEL SZ 7 SURGICAL PF LTX - (50PR/BX 4BX/CA)

## (undated) DEVICE — SUTURE CV

## (undated) DEVICE — STAPLER SKIN DISP - (6/BX 10BX/CA) VISISTAT

## (undated) DEVICE — SUTURE 2-0 ETHILON FS - (36/BX) 18 INCH

## (undated) DEVICE — TRAY SRGPRP PVP IOD WT PRP - (20/CA)

## (undated) DEVICE — BLADE SAGITTAL SAW DUAL CUT 25.0 X 90.0 X 1.27MM (1/EA)

## (undated) DEVICE — DRAPE IOBAN II 23 IN X 33 IN - (10/BX)

## (undated) DEVICE — SUTURE 0 VICRYL PLUS CT-1 - 8 X 18 INCH (12/BX)

## (undated) DEVICE — SUTURE 3-0 ETHILON FS-1 - (36/BX) 30 INCH

## (undated) DEVICE — GLOVE BIOGEL SZ 6.5 SURGICAL PF LTX (50PR/BX 4BX/CA)

## (undated) DEVICE — OBTURATOR BLADELESS LONG 8MM (6EA/BX)

## (undated) DEVICE — DRAPE LARGE 3 QUARTER - (20/CA)